# Patient Record
Sex: FEMALE | Race: BLACK OR AFRICAN AMERICAN | NOT HISPANIC OR LATINO | Employment: OTHER | URBAN - METROPOLITAN AREA
[De-identification: names, ages, dates, MRNs, and addresses within clinical notes are randomized per-mention and may not be internally consistent; named-entity substitution may affect disease eponyms.]

---

## 2017-01-18 ENCOUNTER — OFFICE VISIT (OUTPATIENT)
Dept: NEPHROLOGY | Facility: CLINIC | Age: 27
End: 2017-01-18
Payer: MEDICARE

## 2017-01-18 VITALS
SYSTOLIC BLOOD PRESSURE: 170 MMHG | HEIGHT: 65 IN | BODY MASS INDEX: 22.89 KG/M2 | WEIGHT: 137.38 LBS | HEART RATE: 91 BPM | OXYGEN SATURATION: 100 % | DIASTOLIC BLOOD PRESSURE: 120 MMHG

## 2017-01-18 DIAGNOSIS — N18.6 ESRD (END STAGE RENAL DISEASE): Primary | ICD-10-CM

## 2017-01-18 DIAGNOSIS — N18.6 HYPERTENSIVE KIDNEY DISEASE WITH END-STAGE RENAL DISEASE: ICD-10-CM

## 2017-01-18 DIAGNOSIS — D63.1 ANEMIA OF CHRONIC RENAL FAILURE, STAGE 5: ICD-10-CM

## 2017-01-18 DIAGNOSIS — N25.81 HYPERPARATHYROIDISM, SECONDARY RENAL: ICD-10-CM

## 2017-01-18 DIAGNOSIS — Z94.4 LIVER REPLACED BY TRANSPLANT: ICD-10-CM

## 2017-01-18 DIAGNOSIS — I12.0 HYPERTENSIVE KIDNEY DISEASE WITH END-STAGE RENAL DISEASE: ICD-10-CM

## 2017-01-18 DIAGNOSIS — N18.5 ANEMIA OF CHRONIC RENAL FAILURE, STAGE 5: ICD-10-CM

## 2017-01-18 PROCEDURE — 99999 PR PBB SHADOW E&M-EST. PATIENT-LVL II: CPT | Mod: PBBFAC,,, | Performed by: INTERNAL MEDICINE

## 2017-01-18 PROCEDURE — 99499 UNLISTED E&M SERVICE: CPT | Mod: S$PBB,,, | Performed by: INTERNAL MEDICINE

## 2017-01-18 PROCEDURE — 99212 OFFICE O/P EST SF 10 MIN: CPT | Mod: PBBFAC,PO | Performed by: INTERNAL MEDICINE

## 2017-01-18 RX ORDER — LABETALOL 100 MG/1
100 TABLET, FILM COATED ORAL 2 TIMES DAILY
Qty: 60 TABLET | Refills: 11 | Status: SHIPPED | OUTPATIENT
Start: 2017-01-18 | End: 2017-02-22 | Stop reason: SDUPTHER

## 2017-01-22 NOTE — PROGRESS NOTES
Subjective:       Patient ID: Holly Patel is a 26 y.o. Black or  female who presents for follow-up evaluation of ESRD    HPI    Ms. 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  Patient initiated on hemodialysis October 2015, trained on home hemodialysis February 2016, now performing at home without difficulty (with main assistance from her sister).  Patient reports blood pressures have been elevated.  She otherwise denies any fever, chest pain, shortness of breath, abdominal pain, diarrhea, dysuria/hematuria.    Review of Systems   Constitutional: Negative for appetite change, fatigue and fever.   Respiratory: Negative for chest tightness and shortness of breath.    Cardiovascular: Negative for chest pain and leg swelling.   Gastrointestinal: Negative for abdominal pain, constipation, diarrhea, nausea and vomiting.   Genitourinary: Negative for difficulty urinating, dysuria, flank pain, frequency, hematuria and urgency.   Musculoskeletal: Negative for arthralgias, joint swelling and myalgias.   Skin: Negative for rash and wound.   Neurological: Negative for dizziness, weakness and light-headedness.   All other systems reviewed and are negative.      Objective:      Physical Exam   Constitutional: She appears well-developed and well-nourished.   Cardiovascular: Normal rate, regular rhythm and normal heart sounds.  Exam reveals no gallop and no friction rub.    No murmur heard.  Pulmonary/Chest: Effort normal and breath sounds normal. No respiratory distress. She has no wheezes. She has no rales.   Abdominal: Soft. Bowel sounds are normal. There is no tenderness.   Musculoskeletal: She exhibits no edema.   Neurological: She is alert.   Skin: Skin is warm and dry. No rash noted. No erythema.   Psychiatric: She has a normal mood and affect.   Vitals  reviewed.    Access: L AVF w/ good thrill/bruit    Non-OCF labs Mars  eKt/V 2.4  H/H 10.4/31.6  Ca/Phos 9/4.1  PTH 2505  Alb 3.7    Assessment:       1. ESRD (end stage renal disease)    2. Hypertensive kidney disease with end-stage renal disease    3. Liver replaced by transplant    4. Hyperparathyroidism, secondary renal    5. Anemia of chronic renal failure, stage 5        Plan:     Ms. 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  Patient initiated on hemodialysis October 2015, trained on home hemodialysis February 2016, now performing at home without difficulty (with main assistance from her sister).  Kt/V previously above goal, reduced treatments to 4x/wk, now at goal, will continue to monitor clearance.  Patient followed by Transplant for possible kidney transplantation.  - Hypertension: BP elevated, patient previously unable to afford carvedilol, will start labetolol and review BP log  - Anemia: Hgb at goal, continue erythropoetin therapy  - Bone/mineral metabolism: patient with secondary hyperparathyroidism, discussed low phosphorous diet, previously increased calcitriol, initiated cinacalcet (had not received yet due to insurance/pharmacy)    Return to clinic monthly

## 2017-01-31 ENCOUNTER — TELEPHONE (OUTPATIENT)
Dept: TRANSPLANT | Facility: CLINIC | Age: 27
End: 2017-01-31

## 2017-01-31 NOTE — TELEPHONE ENCOUNTER
----- Message from Marifer Morrell sent at 1/30/2017 10:50 AM CST -----  Contact: emma/Patric  Please call pt has some FU questions  Please call her @ # [phone]

## 2017-01-31 NOTE — TELEPHONE ENCOUNTER
Nurse spoke with patient and informed her that she will be denied due to a history of noncompliance with post liver appointments. She will need 12 moths compliance before being re-referred. Patient verbalized understanding of the above information.

## 2017-02-22 ENCOUNTER — OFFICE VISIT (OUTPATIENT)
Dept: NEPHROLOGY | Facility: CLINIC | Age: 27
End: 2017-02-22
Payer: MEDICARE

## 2017-02-22 VITALS
SYSTOLIC BLOOD PRESSURE: 150 MMHG | OXYGEN SATURATION: 100 % | HEIGHT: 65 IN | DIASTOLIC BLOOD PRESSURE: 90 MMHG | WEIGHT: 141.13 LBS | HEART RATE: 80 BPM | BODY MASS INDEX: 23.52 KG/M2

## 2017-02-22 DIAGNOSIS — I12.0 HYPERTENSIVE KIDNEY DISEASE WITH END-STAGE RENAL DISEASE: ICD-10-CM

## 2017-02-22 DIAGNOSIS — N25.81 HYPERPARATHYROIDISM, SECONDARY RENAL: ICD-10-CM

## 2017-02-22 DIAGNOSIS — Z94.4 LIVER REPLACED BY TRANSPLANT: ICD-10-CM

## 2017-02-22 DIAGNOSIS — N18.5 ANEMIA OF CHRONIC RENAL FAILURE, STAGE 5: ICD-10-CM

## 2017-02-22 DIAGNOSIS — N18.6 HYPERTENSIVE KIDNEY DISEASE WITH END-STAGE RENAL DISEASE: ICD-10-CM

## 2017-02-22 DIAGNOSIS — D63.1 ANEMIA OF CHRONIC RENAL FAILURE, STAGE 5: ICD-10-CM

## 2017-02-22 DIAGNOSIS — N18.6 ESRD (END STAGE RENAL DISEASE): Primary | ICD-10-CM

## 2017-02-22 PROCEDURE — 99213 OFFICE O/P EST LOW 20 MIN: CPT | Mod: PBBFAC,PO | Performed by: INTERNAL MEDICINE

## 2017-02-22 PROCEDURE — 99999 PR PBB SHADOW E&M-EST. PATIENT-LVL III: CPT | Mod: PBBFAC,,, | Performed by: INTERNAL MEDICINE

## 2017-02-22 PROCEDURE — 99499 UNLISTED E&M SERVICE: CPT | Mod: S$PBB,,, | Performed by: INTERNAL MEDICINE

## 2017-02-22 RX ORDER — LABETALOL 200 MG/1
200 TABLET, FILM COATED ORAL 2 TIMES DAILY
Qty: 60 TABLET | Refills: 11 | Status: SHIPPED | OUTPATIENT
Start: 2017-02-22 | End: 2017-10-30 | Stop reason: SDUPTHER

## 2017-02-22 RX ORDER — LABETALOL 200 MG/1
200 TABLET, FILM COATED ORAL 2 TIMES DAILY
Qty: 60 TABLET | Refills: 11 | Status: SHIPPED | OUTPATIENT
Start: 2017-02-22 | End: 2017-02-22 | Stop reason: SDUPTHER

## 2017-02-22 NOTE — MR AVS SNAPSHOT
[facility]  [address] [facility]  [place] [address]  Phone: [phone]                  Holly Patel   2017 4:20 PM   Office Visit    Description:  Female : 1990   Provider:  Viktor Bass MD   Department:  [facility]                To Do List           Future Appointments        Provider Department Dept Phone    2017 4:20 PM Viktor Bass MD [facility] [phone]    3/13/2017 8:15 AM LAB, [facility] [phone]    3/29/2017 4:20 PM Viktor Bass [facility] [phone]      Goals (5 Years of Data)     None      [facility]     [facility] -  Assistance  Registered nurses in the [facility] provide clinical advisement, health education, appointment booking, and other advisory services.  Call for this free service at [phone].             Medications           Message regarding Medications     Verify the changes and/or additions to your medication regime listed below are the same as discussed with your clinician today.  If any of these changes or additions are incorrect, please notify your healthcare provider.             Verify that the below list of medications is an accurate representation of the medications you are currently taking.  If none reported, the list may be blank. If incorrect, please contact your healthcare provider. Carry this list with you in case of emergency.           Current Medications     amlodipine (NORVASC) 10 MG tablet Take 1 tablet (10 mg total) by mouth once daily.    cloNIDine (CATAPRES) 0.1 MG tablet Take 1 tablet (0.1 mg total) by mouth 2 (two) times daily.    labetalol (NORMODYNE) 100 MG tablet Take 1 tablet (100 mg total) by mouth 2 (two) times daily.    tacrolimus (PROGRAF) 1 MG Cap Take 6 capsules (6 mg total) by mouth every 12 (twelve) hours.    triamcinolone acetonide 0.1% (KENALOG) 0.1 % ointment AAA on arms, legs,  "and neck bid x 1-2 wks then prn flares only           Clinical Reference Information           Your Vitals Were     BP Pulse Height Weight SpO2 BMI    150/90 80 5' 5" (1.651 m) 64 kg (141 lb 1.5 oz) 100% 23.48 kg/m2      Blood Pressure          Most Recent Value    BP  (!)  150/90      Allergies as of 2/22/2017     Chloral Hydrate    Hydrocodone      Immunizations Administered on Date of Encounter - 2/22/2017     None      Maintenance Dialysis History     Start End Type Comments Center    10/6/2015  [facility]  [facility]            Current Dialysis Center Information     [facility] [address] [place] Phone #:  [phone]    Contact:  N/A ROXANNA SHAH  [address] Fax #:  [phone]            Transplant Information        Txp Date Organ Coordinator Care Team    11/8/1992 Liver Violeta Francis, EDMOND Surgeon:  Dylan Griffith MD   Current Hepatologist:  Lei Pinedo MD   Corresponding Physician:  Enrrique Moise MD   Current PCP:  Enrrique Moise MD         Language Assistance Services     ATTENTION: Language assistance services are available, free of charge. Please call [phone].      ATENCIÓN: Si habla español, tiene a kidd disposición servicios gratuitos de asistencia lingüística. Llame al [phone].     CHÚ Ý: N?u b?n nói Ti?ng Vi?t, có các d?ch v? h? tr? ngôn ng? mi?n phí dành cho b?n. G?i s? [phone].         [facility] complies with applicable Federal civil rights laws and does not discriminate on the basis of race, color, national origin, age, disability, or sex.        "

## 2017-02-28 NOTE — PROGRESS NOTES
Subjective:       Patient ID: Holly Patel is a 26 y.o. Black or  female who presents for follow-up evaluation of ESRD    HPI    Ms. 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  Patient initiated on hemodialysis October 2015, trained on home hemodialysis February 2016, now performing at home without difficulty (with main assistance from her sister).  Patient reports blood pressures have been better controlled.  She otherwise denies any fever, chest pain, shortness of breath, abdominal pain, diarrhea, dysuria/hematuria.    Review of Systems   Constitutional: Negative for appetite change, fatigue and fever.   Respiratory: Negative for chest tightness and shortness of breath.    Cardiovascular: Negative for chest pain and leg swelling.   Gastrointestinal: Negative for abdominal pain, constipation, diarrhea, nausea and vomiting.   Genitourinary: Negative for difficulty urinating, dysuria, flank pain, frequency, hematuria and urgency.   Musculoskeletal: Negative for arthralgias, joint swelling and myalgias.   Skin: Negative for rash and wound.   Neurological: Negative for dizziness, weakness and light-headedness.   All other systems reviewed and are negative.      Objective:      Physical Exam   Constitutional: She appears well-developed and well-nourished.   Cardiovascular: Normal rate, regular rhythm and normal heart sounds.  Exam reveals no gallop and no friction rub.    No murmur heard.  Pulmonary/Chest: Effort normal and breath sounds normal. No respiratory distress. She has no wheezes. She has no rales.   Abdominal: Soft. Bowel sounds are normal. There is no tenderness.   Musculoskeletal: She exhibits no edema.   Neurological: She is alert.   Skin: Skin is warm and dry. No rash noted. No erythema.   Psychiatric: She has a normal mood and affect.    Vitals reviewed.    Access: L AVF w/ good thrill/bruit    Non-OCF labs Feb  eKt/V 2.4 (Jan)  H/H 9.9/31.0  Ca/Phos 8.0/5.0  PTH 2613  Alb 3.8    Assessment:       1. ESRD (end stage renal disease)    2. Hypertensive kidney disease with end-stage renal disease    3. Liver replaced by transplant    4. Hyperparathyroidism, secondary renal    5. Anemia of chronic renal failure, stage 5        Plan:     Ms. 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  Patient initiated on hemodialysis October 2015, trained on home hemodialysis February 2016, now performing at home without difficulty (with main assistance from her sister).  Kt/V previously above goal, reduced treatments to 4x/wk, now at goal, will continue to monitor clearance.  Patient followed by Transplant for possible kidney transplantation.  - Hypertension: BP improved though still elevated, patient previously unable to afford carvedilol, will increase labetolol and review BP log  - Anemia: Hgb near goal, continue erythropoetin therapy  - Bone/mineral metabolism: patient with secondary hyperparathyroidism, discussed low phosphorous diet, previously increased calcitriol, initiated cinacalcet (had not received yet due to insurance/pharmacy)    Return to clinic monthly

## 2017-03-14 ENCOUNTER — TELEPHONE (OUTPATIENT)
Dept: TRANSPLANT | Facility: CLINIC | Age: 27
End: 2017-03-14

## 2017-03-14 DIAGNOSIS — Z94.4 LIVER TRANSPLANTED: Primary | ICD-10-CM

## 2017-03-14 NOTE — TELEPHONE ENCOUNTER
Spoke with pt, advised that she missed labs yesterday.  Pt states she thought they were for 3/17/17. Agreed to have labs tomorrow. Appointment scheduled.

## 2017-03-15 ENCOUNTER — LAB VISIT (OUTPATIENT)
Dept: LAB | Facility: HOSPITAL | Age: 27
End: 2017-03-15
Attending: INTERNAL MEDICINE
Payer: MEDICARE

## 2017-03-15 DIAGNOSIS — Z94.4 LIVER TRANSPLANTED: ICD-10-CM

## 2017-03-15 LAB
ALBUMIN SERPL BCP-MCNC: 3.3 G/DL
ALP SERPL-CCNC: 379 U/L
ALT SERPL W/O P-5'-P-CCNC: 25 U/L
ANION GAP SERPL CALC-SCNC: 11 MMOL/L
AST SERPL-CCNC: 22 U/L
BASOPHILS # BLD AUTO: 0.01 K/UL
BASOPHILS NFR BLD: 0.2 %
BILIRUB SERPL-MCNC: 0.4 MG/DL
BUN SERPL-MCNC: 68 MG/DL
CALCIUM SERPL-MCNC: 9 MG/DL
CHLORIDE SERPL-SCNC: 106 MMOL/L
CO2 SERPL-SCNC: 20 MMOL/L
CREAT SERPL-MCNC: 7.7 MG/DL
DIFFERENTIAL METHOD: ABNORMAL
EOSINOPHIL # BLD AUTO: 0.6 K/UL
EOSINOPHIL NFR BLD: 14.5 %
ERYTHROCYTE [DISTWIDTH] IN BLOOD BY AUTOMATED COUNT: 13.8 %
EST. GFR  (AFRICAN AMERICAN): 7.6 ML/MIN/1.73 M^2
EST. GFR  (NON AFRICAN AMERICAN): 6.6 ML/MIN/1.73 M^2
GLUCOSE SERPL-MCNC: 98 MG/DL
HCT VFR BLD AUTO: 31.6 %
HGB BLD-MCNC: 10.3 G/DL
LYMPHOCYTES # BLD AUTO: 0.9 K/UL
LYMPHOCYTES NFR BLD: 22.4 %
MCH RBC QN AUTO: 26.5 PG
MCHC RBC AUTO-ENTMCNC: 32.6 %
MCV RBC AUTO: 81 FL
MONOCYTES # BLD AUTO: 0.3 K/UL
MONOCYTES NFR BLD: 6.7 %
NEUTROPHILS # BLD AUTO: 2.3 K/UL
NEUTROPHILS NFR BLD: 56 %
PLATELET # BLD AUTO: 113 K/UL
PMV BLD AUTO: 10 FL
POTASSIUM SERPL-SCNC: 4.7 MMOL/L
PROT SERPL-MCNC: 7.3 G/DL
RBC # BLD AUTO: 3.89 M/UL
SODIUM SERPL-SCNC: 137 MMOL/L
WBC # BLD AUTO: 4.15 K/UL

## 2017-03-15 PROCEDURE — 85025 COMPLETE CBC W/AUTO DIFF WBC: CPT

## 2017-03-15 PROCEDURE — 36415 COLL VENOUS BLD VENIPUNCTURE: CPT | Mod: PO

## 2017-03-15 PROCEDURE — 80197 ASSAY OF TACROLIMUS: CPT

## 2017-03-15 PROCEDURE — 80053 COMPREHEN METABOLIC PANEL: CPT

## 2017-03-16 LAB — TACROLIMUS BLD-MCNC: <1.5 NG/ML

## 2017-03-17 ENCOUNTER — TELEPHONE (OUTPATIENT)
Dept: TRANSPLANT | Facility: CLINIC | Age: 27
End: 2017-03-17

## 2017-03-17 NOTE — LETTER
March 17, 2017    Holly Patel  [address] [place] [address]          Dear Holly Patel:  MRN: [id number]    Your lab results were stable.  There are no medicine changes.  Please have your labs drawn again on 6/5/17.      If you cannot have your labs drawn on the scheduled date, it is your responsibility to call the transplant department to reschedule.  To reschedule or make an appointment, please as to speak to or leave a message for my assistant, Jaki Chavis, at [phone].  When leaving a message for Palmira Pollack, or myself, we ask that you leave a brief message regarding your request.    Sincerely,    Voileta Francis, RN, BSN  Liver Transplant Coordinator  [facility]  [address] [place] [id number] [phone]

## 2017-03-29 ENCOUNTER — OFFICE VISIT (OUTPATIENT)
Dept: NEPHROLOGY | Facility: CLINIC | Age: 27
End: 2017-03-29
Payer: MEDICARE

## 2017-03-29 VITALS
HEART RATE: 94 BPM | OXYGEN SATURATION: 100 % | WEIGHT: 143.94 LBS | HEIGHT: 65 IN | BODY MASS INDEX: 23.98 KG/M2 | SYSTOLIC BLOOD PRESSURE: 160 MMHG | DIASTOLIC BLOOD PRESSURE: 90 MMHG

## 2017-03-29 DIAGNOSIS — N18.5 ANEMIA OF CHRONIC RENAL FAILURE, STAGE 5: ICD-10-CM

## 2017-03-29 DIAGNOSIS — N18.6 ESRD (END STAGE RENAL DISEASE): Primary | ICD-10-CM

## 2017-03-29 DIAGNOSIS — Z94.4 LIVER REPLACED BY TRANSPLANT: ICD-10-CM

## 2017-03-29 DIAGNOSIS — I12.0 HYPERTENSIVE KIDNEY DISEASE WITH END-STAGE RENAL DISEASE: ICD-10-CM

## 2017-03-29 DIAGNOSIS — N25.81 HYPERPARATHYROIDISM, SECONDARY RENAL: ICD-10-CM

## 2017-03-29 DIAGNOSIS — D63.1 ANEMIA OF CHRONIC RENAL FAILURE, STAGE 5: ICD-10-CM

## 2017-03-29 DIAGNOSIS — N18.6 HYPERTENSIVE KIDNEY DISEASE WITH END-STAGE RENAL DISEASE: ICD-10-CM

## 2017-03-29 PROCEDURE — 99999 PR PBB SHADOW E&M-EST. PATIENT-LVL II: CPT | Mod: PBBFAC,,, | Performed by: INTERNAL MEDICINE

## 2017-03-29 PROCEDURE — 99212 OFFICE O/P EST SF 10 MIN: CPT | Mod: PBBFAC,PO | Performed by: INTERNAL MEDICINE

## 2017-03-29 PROCEDURE — 99499 UNLISTED E&M SERVICE: CPT | Mod: S$PBB,,, | Performed by: INTERNAL MEDICINE

## 2017-04-04 NOTE — PROGRESS NOTES
Subjective:       Patient ID: Holly Patel is a 26 y.o. Black or  female who presents for follow-up evaluation of ESRD    HPI    Ms. 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  Patient initiated on hemodialysis October 2015, trained on home hemodialysis February 2016, now performing at home without difficulty (with main assistance from her sister).  Patient reports blood pressures have been better controlled.  She otherwise denies any fever, chest pain, shortness of breath, abdominal pain, diarrhea, dysuria/hematuria.    Review of Systems   Constitutional: Negative for appetite change, fatigue and fever.   Respiratory: Negative for chest tightness and shortness of breath.    Cardiovascular: Negative for chest pain and leg swelling.   Gastrointestinal: Negative for abdominal pain, constipation, diarrhea, nausea and vomiting.   Genitourinary: Negative for difficulty urinating, dysuria, flank pain, frequency, hematuria and urgency.   Musculoskeletal: Negative for arthralgias, joint swelling and myalgias.   Skin: Negative for rash and wound.   Neurological: Negative for dizziness, weakness and light-headedness.   All other systems reviewed and are negative.      Objective:      Physical Exam   Constitutional: She appears well-developed and well-nourished.   Cardiovascular: Normal rate, regular rhythm and normal heart sounds.  Exam reveals no gallop and no friction rub.    No murmur heard.  Pulmonary/Chest: Effort normal and breath sounds normal. No respiratory distress. She has no wheezes. She has no rales.   Abdominal: Soft. Bowel sounds are normal. There is no tenderness.   Musculoskeletal: She exhibits no edema.   Neurological: She is alert.   Skin: Skin is warm and dry. No rash noted. No erythema.   Psychiatric: She has a normal mood and affect.    Vitals reviewed.    Access: L AVF w/ good thrill/bruit    Non-OCF labs March  eKt/V 2.3  H/H 10.3/30.3  Ca/Phos 9.1/5.0  PTH 2842  Alb 3.8    Assessment:       1. ESRD (end stage renal disease)    2. Hypertensive kidney disease with end-stage renal disease    3. Liver replaced by transplant    4. Hyperparathyroidism, secondary renal    5. Anemia of chronic renal failure, stage 5        Plan:     Ms. 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  Patient initiated on hemodialysis October 2015, trained on home hemodialysis February 2016, now performing at home without difficulty (with main assistance from her sister).  Kt/V previously above goal, reduced treatments to 4x/wk, now at goal, will continue to monitor clearance.  Patient followed by Transplant for possible kidney transplantation.  - Hypertension: BP improved though still elevated, patient previously unable to afford carvedilol, increased labetolol and review BP log  - Anemia: Hgb at goal, continue erythropoetin therapy  - Bone/mineral metabolism: patient with secondary hyperparathyroidism, discussed low phosphorous diet, previously increased calcitriol, initiated cinacalcet (had not received previously due to insurance/pharmacy)    Return to clinic monthly

## 2017-04-12 ENCOUNTER — TELEPHONE (OUTPATIENT)
Dept: TRANSPLANT | Facility: CLINIC | Age: 27
End: 2017-04-12

## 2017-04-12 ENCOUNTER — OFFICE VISIT (OUTPATIENT)
Dept: NEPHROLOGY | Facility: CLINIC | Age: 27
End: 2017-04-12
Payer: MEDICARE

## 2017-04-12 VITALS
HEIGHT: 65 IN | HEART RATE: 103 BPM | BODY MASS INDEX: 23.88 KG/M2 | SYSTOLIC BLOOD PRESSURE: 162 MMHG | OXYGEN SATURATION: 100 % | DIASTOLIC BLOOD PRESSURE: 100 MMHG | WEIGHT: 143.31 LBS

## 2017-04-12 DIAGNOSIS — D63.1 ANEMIA OF CHRONIC RENAL FAILURE, STAGE 5: ICD-10-CM

## 2017-04-12 DIAGNOSIS — I12.0 HYPERTENSIVE KIDNEY DISEASE WITH END-STAGE RENAL DISEASE: ICD-10-CM

## 2017-04-12 DIAGNOSIS — N18.6 HYPERTENSIVE KIDNEY DISEASE WITH END-STAGE RENAL DISEASE: ICD-10-CM

## 2017-04-12 DIAGNOSIS — N18.6 ESRD (END STAGE RENAL DISEASE): Primary | ICD-10-CM

## 2017-04-12 DIAGNOSIS — Z94.4 LIVER REPLACED BY TRANSPLANT: ICD-10-CM

## 2017-04-12 DIAGNOSIS — N18.5 ANEMIA OF CHRONIC RENAL FAILURE, STAGE 5: ICD-10-CM

## 2017-04-12 DIAGNOSIS — N25.81 HYPERPARATHYROIDISM, SECONDARY RENAL: ICD-10-CM

## 2017-04-12 PROCEDURE — 99999 PR PBB SHADOW E&M-EST. PATIENT-LVL II: CPT | Mod: PBBFAC,,, | Performed by: INTERNAL MEDICINE

## 2017-04-12 PROCEDURE — 99212 OFFICE O/P EST SF 10 MIN: CPT | Mod: PBBFAC,PO | Performed by: INTERNAL MEDICINE

## 2017-04-12 PROCEDURE — 99499 UNLISTED E&M SERVICE: CPT | Mod: S$PBB,,, | Performed by: INTERNAL MEDICINE

## 2017-04-12 NOTE — TELEPHONE ENCOUNTER
----- Message from Jaki Mejia sent at 4/12/2017  2:22 PM CDT -----  I spoke to the patient today ( 4-12-17 ) at 2:20 pm , and she accepted the appt with Dr. Pinedo on 4/18/17 at 4:30 pm

## 2017-04-18 ENCOUNTER — OFFICE VISIT (OUTPATIENT)
Dept: TRANSPLANT | Facility: CLINIC | Age: 27
End: 2017-04-18
Payer: MEDICARE

## 2017-04-18 VITALS
TEMPERATURE: 98 F | OXYGEN SATURATION: 99 % | DIASTOLIC BLOOD PRESSURE: 110 MMHG | HEIGHT: 65 IN | HEART RATE: 84 BPM | WEIGHT: 141.31 LBS | SYSTOLIC BLOOD PRESSURE: 170 MMHG | BODY MASS INDEX: 23.54 KG/M2 | RESPIRATION RATE: 18 BRPM

## 2017-04-18 DIAGNOSIS — Z99.2 END STAGE RENAL DISEASE ON DIALYSIS: ICD-10-CM

## 2017-04-18 DIAGNOSIS — Z29.89 PROPHYLACTIC IMMUNOTHERAPY: ICD-10-CM

## 2017-04-18 DIAGNOSIS — N18.6 END STAGE RENAL DISEASE ON DIALYSIS: ICD-10-CM

## 2017-04-18 DIAGNOSIS — I15.0 RENOVASCULAR HYPERTENSION: Primary | ICD-10-CM

## 2017-04-18 DIAGNOSIS — Z94.4 LIVER REPLACED BY TRANSPLANT: ICD-10-CM

## 2017-04-18 PROCEDURE — 99213 OFFICE O/P EST LOW 20 MIN: CPT | Mod: PBBFAC | Performed by: INTERNAL MEDICINE

## 2017-04-18 PROCEDURE — 99999 PR PBB SHADOW E&M-EST. PATIENT-LVL III: CPT | Mod: PBBFAC,,, | Performed by: INTERNAL MEDICINE

## 2017-04-18 PROCEDURE — 99214 OFFICE O/P EST MOD 30 MIN: CPT | Mod: S$PBB,,, | Performed by: INTERNAL MEDICINE

## 2017-04-18 RX ORDER — PREDNISONE 1 MG/1
1 TABLET ORAL EVERY OTHER DAY
Status: ON HOLD | COMMUNITY
End: 2018-09-04 | Stop reason: SDUPTHER

## 2017-04-18 NOTE — PROGRESS NOTES
Subjective:       Patient ID: Holly Patel is a 26 y.o. female.    Chief Complaint: Liver Transplant Follow-up    HPI  I saw this 26 year old  lady in the liver transplant clinic. She is keeping well but she continues to have uncontrolled hypertension and end stage renal disease and is now on dialysis- home HD.    She gets her blood tests done irregularly and despite being on tacrolimus 12 mg BID and has always had an undetectable or very low tacrolimus level.   She is adamant that she takes her medications.      OLT  aged 2 for hemangioendothelioma  On HD since Oct 2015, home HD since 2016.  Seen by Social Work and deemed that she needs to show compliance before she can be evaluated for kidney Tx.    LFts- normal    BP today is  170/110      Abdominal US: 2016  1.  Appropriate hepatic vascular flow.  2.  Satisfactory post op status.    Liver biopsy: 3/12/2016  LIVER, POST TRANSPLANT 2009 (NEEDLE BIOPSY):  Rare endothelitis, not diagnostic of acute cellular rejection  Small biopsy (only 4 portal tracts present), no bile duct loss  Rare apoptotic bodies and foci of neutrophils  No fibrosis, moderate Kupffer cell iron, no hepatocyte iron.    Tac 12 mg BID + prednisone 5 mg every other day    Lab Results   Component Value Date    ALT 25 03/15/2017    AST 22 03/15/2017     (H) 2012    ALKPHOS 379 (H) 03/15/2017    BILITOT 0.4 03/15/2017     Past Medical History:   Diagnosis Date    Allergy     Anemia requiring transfusions 2015    ESRD (end stage renal disease) 2015    Hypertension     Kidney disease     Liver transplanted     Malignant hypertension with chronic kidney disease stage IV 2014    Renal disorder      Past Surgical History:   Procedure Laterality Date     SECTION      2     LIVER BIOPSY      LIVER TRANSPLANT  1992    TUBAL LIGATION       Current Outpatient Prescriptions   Medication Sig    amlodipine (NORVASC) 10 MG  tablet Take 1 tablet (10 mg total) by mouth once daily.    labetalol (NORMODYNE) 200 MG tablet Take 1 tablet (200 mg total) by mouth 2 (two) times daily.    predniSONE (DELTASONE) 1 MG tablet Take 1 mg by mouth every other day.    tacrolimus (PROGRAF) 1 MG Cap Take 6 capsules (6 mg total) by mouth every 12 (twelve) hours.    triamcinolone acetonide 0.1% (KENALOG) 0.1 % ointment AAA on arms, legs, and neck bid x 1-2 wks then prn flares only (Patient taking differently: Apply to affected area(s) on arms, legs, and neck twice daily x 1-2 wks then as needed for flares only)    cloNIDine (CATAPRES) 0.1 MG tablet Take 1 tablet (0.1 mg total) by mouth 2 (two) times daily.     No current facility-administered medications for this visit.          Review of Systems   Constitutional: Negative for activity change, appetite change, chills, fatigue, fever and unexpected weight change.   HENT: Negative for ear pain, hearing loss, nosebleeds, sore throat and trouble swallowing.    Eyes: Negative for redness and visual disturbance.   Respiratory: Negative for cough, chest tightness, shortness of breath and wheezing.    Cardiovascular: Negative for chest pain and palpitations.   Gastrointestinal: Negative for abdominal distention, abdominal pain, blood in stool, constipation, diarrhea, nausea and vomiting.   Genitourinary: Negative for difficulty urinating, dysuria, frequency, hematuria and urgency.   Musculoskeletal: Negative for arthralgias, back pain, gait problem, joint swelling and myalgias.   Skin: Negative for rash.   Neurological: Negative for tremors, seizures, speech difficulty, weakness and headaches.   Hematological: Negative for adenopathy.   Psychiatric/Behavioral: Negative for confusion, decreased concentration and sleep disturbance. The patient is not nervous/anxious.          Objective:    BP (!) 170/110 (BP Location: Right arm, Patient Position: Sitting, BP Method: Automatic)  Pulse 84  Temp 98 °F (36.7 °C)  "(Oral)   Resp 18  Ht 5' 5" (1.651 m)  Wt 64.1 kg (141 lb 5 oz)  SpO2 99%  BMI 23.52 kg/m2    Physical Exam   Constitutional: She appears well-developed and well-nourished. No distress.   HENT:   Head: Normocephalic.   Eyes: Pupils are equal, round, and reactive to light.   Neck: No JVD present. No tracheal deviation present. No thyromegaly present.   Cardiovascular: Normal rate, regular rhythm and normal heart sounds.    No murmur heard.  Pulmonary/Chest: Effort normal and breath sounds normal. No stridor.   Abdominal: Soft. Bowel sounds are normal. She exhibits no distension. There is no tenderness.   Musculoskeletal: She exhibits no edema.   Lymphadenopathy:        Head (right side): No submental, no submandibular, no tonsillar, no preauricular, no posterior auricular and no occipital adenopathy present.        Head (left side): No submental, no submandibular, no tonsillar, no preauricular, no posterior auricular and no occipital adenopathy present.     She has no cervical adenopathy.     She has no axillary adenopathy.   Neurological: She is alert. She has normal strength. She is not disoriented. No cranial nerve deficit or sensory deficit.   Skin: Skin is intact. No cyanosis.       Assessment:       1. Renovascular hypertension    2. Prophylactic immunotherapy    3. Liver replaced by transplant    4. End stage renal disease on dialysis        Plan:   1. Uncontrolled hypertension  2. Very low tac level- compliance? Claims that she always takes her meds  3. No change in immunosuppression    Wishes to be reconsidered for kidney Tx.  - will ask social work to re-evaluate.  - no medication changes at present.    Clinic in 1 year.      Clinic in 6 months    "

## 2017-04-18 NOTE — LETTER
April 23, 2017        Enrrique Moise  [address]  [facility] [address]  Phone: [phone]  Fax: [phone]             [facility]  [address]  [facility] [address]  Phone: [phone]   Patient: Holly Patel   MR Number: [id number]   YOB: 1990   Date of Visit: 4/18/2017       Dear Dr. Enrrique Moise    Thank you for referring Holly Patel to me for evaluation. Attached you will find relevant portions of my assessment and plan of care.    If you have questions, please do not hesitate to call me. I look forward to following Holly Patel along with you.    Sincerely,    Lei Pinedo MD    Enclosure    If you would like to receive this communication electronically, please contact [email or web address] or [phone] to request [employer] Link access.    [employer] Link is a tool which provides read-only access to select patient information with whom you have a relationship. Its easy to use and provides real time access to review your patients record including encounter summaries, notes, results, and demographic information.    If you feel you have received this communication in error or would no longer like to receive these types of communications, please e-mail [email or web address]

## 2017-04-18 NOTE — PROGRESS NOTES
Subjective:       Patient ID: Holly Patel is a 26 y.o. Black or  female who presents for follow-up evaluation of ESRD    HPI    Ms. 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  Patient initiated on hemodialysis October 2015, trained on home hemodialysis February 2016, now performing at home without difficulty (with main assistance from her sister).  Patient reports blood pressures have been better controlled.  She otherwise denies any fever, chest pain, shortness of breath, abdominal pain, diarrhea, dysuria/hematuria.    Review of Systems   Constitutional: Negative for appetite change, fatigue and fever.   Respiratory: Negative for chest tightness and shortness of breath.    Cardiovascular: Negative for chest pain and leg swelling.   Gastrointestinal: Negative for abdominal pain, constipation, diarrhea, nausea and vomiting.   Genitourinary: Negative for difficulty urinating, dysuria, flank pain, frequency, hematuria and urgency.   Musculoskeletal: Negative for arthralgias, joint swelling and myalgias.   Skin: Negative for rash and wound.   Neurological: Negative for dizziness, weakness and light-headedness.   All other systems reviewed and are negative.      Objective:      Physical Exam   Constitutional: She appears well-developed and well-nourished.   Cardiovascular: Normal rate, regular rhythm and normal heart sounds.  Exam reveals no gallop and no friction rub.    No murmur heard.  Pulmonary/Chest: Effort normal and breath sounds normal. No respiratory distress. She has no wheezes. She has no rales.   Abdominal: Soft. Bowel sounds are normal. There is no tenderness.   Musculoskeletal: She exhibits no edema.   Neurological: She is alert.   Skin: Skin is warm and dry. No rash noted. No erythema.   Psychiatric: She has a normal mood and affect.    Vitals reviewed.    Access: L AVF w/ good thrill/bruit    Non-OCF labs April  eKt/V 2.3 (March)  H/H 9.4/28.3  Ca/Phos 8.8/4.7  PTH 1563  Alb 3.6    Assessment:       1. ESRD (end stage renal disease)    2. Hypertensive kidney disease with end-stage renal disease    3. Liver replaced by transplant    4. Hyperparathyroidism, secondary renal    5. Anemia of chronic renal failure, stage 5        Plan:     Ms. 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  Patient initiated on hemodialysis October 2015, trained on home hemodialysis February 2016, now performing at home without difficulty (with main assistance from her sister).  Kt/V previously above goal, reduced treatments to 4x/wk, now at goal, will continue to monitor clearance.  Patient followed by Transplant for possible kidney transplantation.  - Hypertension: BP improved though still elevated, patient previously unable to afford carvedilol, previously increased labetolol  - Anemia: Hgb at goal, continue erythropoetin therapy  - Bone/mineral metabolism: patient with secondary hyperparathyroidism, discussed low phosphorous diet; previously increased calcitriol, initiated cinacalcet (had not received previously due to insurance/pharmacy), PTH trending down    Return to clinic monthly

## 2017-05-24 ENCOUNTER — OFFICE VISIT (OUTPATIENT)
Dept: NEPHROLOGY | Facility: CLINIC | Age: 27
End: 2017-05-24
Payer: MEDICARE

## 2017-05-24 VITALS
DIASTOLIC BLOOD PRESSURE: 100 MMHG | HEIGHT: 65 IN | OXYGEN SATURATION: 100 % | HEART RATE: 76 BPM | BODY MASS INDEX: 22.96 KG/M2 | WEIGHT: 137.81 LBS | SYSTOLIC BLOOD PRESSURE: 170 MMHG

## 2017-05-24 DIAGNOSIS — I12.0 HYPERTENSIVE KIDNEY DISEASE WITH END-STAGE RENAL DISEASE: ICD-10-CM

## 2017-05-24 DIAGNOSIS — N18.6 ESRD (END STAGE RENAL DISEASE): Primary | ICD-10-CM

## 2017-05-24 DIAGNOSIS — N25.81 HYPERPARATHYROIDISM, SECONDARY RENAL: ICD-10-CM

## 2017-05-24 DIAGNOSIS — Z94.4 LIVER REPLACED BY TRANSPLANT: ICD-10-CM

## 2017-05-24 DIAGNOSIS — D63.1 ANEMIA OF CHRONIC RENAL FAILURE, STAGE 5: ICD-10-CM

## 2017-05-24 DIAGNOSIS — N18.5 ANEMIA OF CHRONIC RENAL FAILURE, STAGE 5: ICD-10-CM

## 2017-05-24 DIAGNOSIS — N18.6 HYPERTENSIVE KIDNEY DISEASE WITH END-STAGE RENAL DISEASE: ICD-10-CM

## 2017-05-24 PROCEDURE — 99499 UNLISTED E&M SERVICE: CPT | Mod: S$PBB,,, | Performed by: INTERNAL MEDICINE

## 2017-05-24 PROCEDURE — 99999 PR PBB SHADOW E&M-EST. PATIENT-LVL II: CPT | Mod: PBBFAC,,, | Performed by: INTERNAL MEDICINE

## 2017-05-24 PROCEDURE — 99212 OFFICE O/P EST SF 10 MIN: CPT | Mod: PBBFAC,PO | Performed by: INTERNAL MEDICINE

## 2017-06-09 ENCOUNTER — TELEPHONE (OUTPATIENT)
Dept: TRANSPLANT | Facility: CLINIC | Age: 27
End: 2017-06-09

## 2017-06-09 ENCOUNTER — LAB VISIT (OUTPATIENT)
Dept: LAB | Facility: HOSPITAL | Age: 27
End: 2017-06-09
Attending: INTERNAL MEDICINE
Payer: MEDICARE

## 2017-06-09 DIAGNOSIS — Z94.4 LIVER TRANSPLANTED: Primary | ICD-10-CM

## 2017-06-09 DIAGNOSIS — R79.89 ABNORMAL LFTS: ICD-10-CM

## 2017-06-09 DIAGNOSIS — Z94.4 LIVER TRANSPLANTED: ICD-10-CM

## 2017-06-09 LAB
ALBUMIN SERPL BCP-MCNC: 3 G/DL
ALP SERPL-CCNC: 870 U/L
ALT SERPL W/O P-5'-P-CCNC: 270 U/L
ANION GAP SERPL CALC-SCNC: 12 MMOL/L
ANISOCYTOSIS BLD QL SMEAR: SLIGHT
AST SERPL-CCNC: 182 U/L
BASOPHILS # BLD AUTO: 0.01 K/UL
BASOPHILS NFR BLD: 0.2 %
BILIRUB SERPL-MCNC: 0.9 MG/DL
BUN SERPL-MCNC: 63 MG/DL
CALCIUM SERPL-MCNC: 8.7 MG/DL
CHLORIDE SERPL-SCNC: 106 MMOL/L
CO2 SERPL-SCNC: 20 MMOL/L
CREAT SERPL-MCNC: 8 MG/DL
DIFFERENTIAL METHOD: ABNORMAL
EOSINOPHIL # BLD AUTO: 0.5 K/UL
EOSINOPHIL NFR BLD: 11.1 %
ERYTHROCYTE [DISTWIDTH] IN BLOOD BY AUTOMATED COUNT: 14 %
EST. GFR  (AFRICAN AMERICAN): 7.3 ML/MIN/1.73 M^2
EST. GFR  (NON AFRICAN AMERICAN): 6.3 ML/MIN/1.73 M^2
GLUCOSE SERPL-MCNC: 81 MG/DL
HCT VFR BLD AUTO: 30.2 %
HGB BLD-MCNC: 9.9 G/DL
HYPOCHROMIA BLD QL SMEAR: ABNORMAL
LYMPHOCYTES # BLD AUTO: 0.8 K/UL
LYMPHOCYTES NFR BLD: 19.5 %
MCH RBC QN AUTO: 26.1 PG
MCHC RBC AUTO-ENTMCNC: 32.8 %
MCV RBC AUTO: 80 FL
MONOCYTES # BLD AUTO: 0.2 K/UL
MONOCYTES NFR BLD: 5.9 %
NEUTROPHILS # BLD AUTO: 2.6 K/UL
NEUTROPHILS NFR BLD: 63.3 %
PLATELET # BLD AUTO: 83 K/UL
PLATELET BLD QL SMEAR: ABNORMAL
PMV BLD AUTO: 10.2 FL
POTASSIUM SERPL-SCNC: 4.6 MMOL/L
PROT SERPL-MCNC: 7 G/DL
RBC # BLD AUTO: 3.79 M/UL
SODIUM SERPL-SCNC: 138 MMOL/L
TACROLIMUS BLD-MCNC: <1.5 NG/ML
WBC # BLD AUTO: 4.06 K/UL

## 2017-06-09 PROCEDURE — 80053 COMPREHEN METABOLIC PANEL: CPT

## 2017-06-09 PROCEDURE — 36415 COLL VENOUS BLD VENIPUNCTURE: CPT | Mod: PO

## 2017-06-09 PROCEDURE — 85025 COMPLETE CBC W/AUTO DIFF WBC: CPT

## 2017-06-09 PROCEDURE — 80197 ASSAY OF TACROLIMUS: CPT

## 2017-06-09 NOTE — TELEPHONE ENCOUNTER
Reviewed elevated labs with Dr. Garza. Per MD, pt needs repeat labs Monday and an u/s and biopsy week of 6/12.    Spoke with pt, reviewed elevated labs and that prograf is undetectable again.  Pt did not deny or confirm if she is taking Prograf. Pt advised that she is most likely in rejection and will needs labs Monday, and u/s and biopsy next week.  Pt verbalized understanding.

## 2017-06-12 ENCOUNTER — HOSPITAL ENCOUNTER (OUTPATIENT)
Dept: RADIOLOGY | Facility: HOSPITAL | Age: 27
Discharge: HOME | End: 2017-06-12
Attending: INTERNAL MEDICINE
Payer: MEDICARE

## 2017-06-12 ENCOUNTER — TELEPHONE (OUTPATIENT)
Dept: TRANSPLANT | Facility: CLINIC | Age: 27
End: 2017-06-12

## 2017-06-12 DIAGNOSIS — R79.89 ABNORMAL LFTS: ICD-10-CM

## 2017-06-12 DIAGNOSIS — Z94.4 LIVER TRANSPLANTED: ICD-10-CM

## 2017-06-12 PROCEDURE — 93975 VASCULAR STUDY: CPT | Mod: 26,GC,, | Performed by: RADIOLOGY

## 2017-06-12 PROCEDURE — 76705 ECHO EXAM OF ABDOMEN: CPT | Mod: 26,59,GC, | Performed by: RADIOLOGY

## 2017-06-12 PROCEDURE — 93975 VASCULAR STUDY: CPT | Mod: TC

## 2017-06-12 NOTE — TELEPHONE ENCOUNTER
----- Message from Sherry Garza MD sent at 6/12/2017  2:38 PM CDT -----  Needs liver biopsy - please let patient know.

## 2017-06-13 ENCOUNTER — TELEPHONE (OUTPATIENT)
Dept: TRANSPLANT | Facility: CLINIC | Age: 27
End: 2017-06-13

## 2017-06-13 DIAGNOSIS — Z94.4 LIVER TRANSPLANTED: ICD-10-CM

## 2017-06-13 DIAGNOSIS — Z94.4 LIVER TRANSPLANTED: Primary | ICD-10-CM

## 2017-06-13 DIAGNOSIS — R79.89 ABNORMAL LFTS: Primary | ICD-10-CM

## 2017-06-13 NOTE — TELEPHONE ENCOUNTER
Called pt to review u/s and biopsy instructions.    Pt verbalized understanding to fast after midnight Thursday, take medications Friday AM with a small sip of water and report to [facility] at 0800 for biopsy at 1000.  Pt states her mother will be present.  Pt also verbalized understanding that she needs to make sure she takes her BP medication as directed because if her BP is elevated, the biopsy will be cancelled and she will most likely be sent to ED.

## 2017-06-13 NOTE — TELEPHONE ENCOUNTER
"Spoke to pt, discussed Prograf level.  Discussed with pt that her outpatients labs always reflect that she is not taking prograf/ or taking prograf correctly because her level is always undetectable, even with dose increases. Pt states she has been on 6mg BID. Discussed that labs last week were undetectable but labs yesterday indicated her Prograf level was 2.7.  Asked pt to be honest if she restarted Prograf. Pt adamant that she has not missed any doses.  Then she backtracked and said she "may miss a dose or two here and there."  Advised pt that we need to know what she is really taking or not taking so we can safely increase her dose. Pt again adamant that she takes 6mg BID.  Advised pt that if that is her current dose, she needs to take 8mg tonight and then start 7mg BID tomorrow.  Pt will have biopsy Friday and repeat labs Monday with prograf level.  "

## 2017-06-13 NOTE — TELEPHONE ENCOUNTER
----- Message from Sherry Garza MD sent at 6/13/2017  4:12 PM CDT -----  Increase prograf to 7/7 (take 8 mg tonight for a total of 14 mg per day)- please let patient know.

## 2017-06-13 NOTE — TELEPHONE ENCOUNTER
----- Message from Sherry Garza MD sent at 6/13/2017  7:21 AM CDT -----  U/s ok- please let patient know.

## 2017-06-14 ENCOUNTER — OFFICE VISIT (OUTPATIENT)
Dept: NEPHROLOGY | Facility: CLINIC | Age: 27
End: 2017-06-14
Payer: MEDICARE

## 2017-06-14 VITALS
HEART RATE: 81 BPM | BODY MASS INDEX: 21.81 KG/M2 | DIASTOLIC BLOOD PRESSURE: 98 MMHG | WEIGHT: 130.94 LBS | OXYGEN SATURATION: 99 % | HEIGHT: 65 IN | SYSTOLIC BLOOD PRESSURE: 150 MMHG

## 2017-06-14 DIAGNOSIS — N18.5 ANEMIA OF CHRONIC RENAL FAILURE, STAGE 5: ICD-10-CM

## 2017-06-14 DIAGNOSIS — I12.0 HYPERTENSIVE KIDNEY DISEASE WITH END-STAGE RENAL DISEASE: ICD-10-CM

## 2017-06-14 DIAGNOSIS — N18.6 ESRD (END STAGE RENAL DISEASE): Primary | ICD-10-CM

## 2017-06-14 DIAGNOSIS — Z94.4 LIVER REPLACED BY TRANSPLANT: ICD-10-CM

## 2017-06-14 DIAGNOSIS — D63.1 ANEMIA OF CHRONIC RENAL FAILURE, STAGE 5: ICD-10-CM

## 2017-06-14 DIAGNOSIS — N18.6 HYPERTENSIVE KIDNEY DISEASE WITH END-STAGE RENAL DISEASE: ICD-10-CM

## 2017-06-14 DIAGNOSIS — N25.81 HYPERPARATHYROIDISM, SECONDARY RENAL: ICD-10-CM

## 2017-06-14 PROCEDURE — 99212 OFFICE O/P EST SF 10 MIN: CPT | Mod: PBBFAC,PO | Performed by: INTERNAL MEDICINE

## 2017-06-14 PROCEDURE — 99999 PR PBB SHADOW E&M-EST. PATIENT-LVL II: CPT | Mod: PBBFAC,,, | Performed by: INTERNAL MEDICINE

## 2017-06-14 PROCEDURE — 99499 UNLISTED E&M SERVICE: CPT | Mod: S$PBB,,, | Performed by: INTERNAL MEDICINE

## 2017-06-14 RX ORDER — TACROLIMUS 1 MG/1
7 CAPSULE ORAL EVERY 12 HOURS
Qty: 420 CAPSULE | Refills: 1 | Status: SHIPPED | OUTPATIENT
Start: 2017-06-14 | End: 2017-07-12 | Stop reason: DRUGHIGH

## 2017-06-15 ENCOUNTER — TELEPHONE (OUTPATIENT)
Dept: TRANSPLANT | Facility: CLINIC | Age: 27
End: 2017-06-15

## 2017-06-15 DIAGNOSIS — Z94.4 LIVER TRANSPLANTED: Primary | ICD-10-CM

## 2017-06-15 NOTE — TELEPHONE ENCOUNTER
Called pt, no answer. LVM - lab work needed prior to biopsy check-in tomorrow at 7.  Requested return call with any questions or concerns.       Repeat CBC scheduled per radiologist request.

## 2017-06-16 ENCOUNTER — HOSPITAL ENCOUNTER (OUTPATIENT)
Facility: HOSPITAL | Age: 27
Discharge: HOME | End: 2017-06-16
Attending: INTERNAL MEDICINE | Admitting: INTERNAL MEDICINE
Payer: MEDICARE

## 2017-06-16 ENCOUNTER — SURGERY (OUTPATIENT)
Age: 27
End: 2017-06-16

## 2017-06-16 VITALS
WEIGHT: 140 LBS | TEMPERATURE: 98 F | SYSTOLIC BLOOD PRESSURE: 154 MMHG | RESPIRATION RATE: 16 BRPM | HEART RATE: 101 BPM | OXYGEN SATURATION: 100 % | BODY MASS INDEX: 23.32 KG/M2 | HEIGHT: 65 IN | DIASTOLIC BLOOD PRESSURE: 75 MMHG

## 2017-06-16 DIAGNOSIS — R79.89 ABNORMAL LFTS: ICD-10-CM

## 2017-06-16 DIAGNOSIS — Z94.4 LIVER TRANSPLANTED: ICD-10-CM

## 2017-06-16 DIAGNOSIS — K76.89 LIVER DYSFUNCTION: ICD-10-CM

## 2017-06-16 LAB
BASOPHILS # BLD AUTO: 0.01 K/UL
BASOPHILS NFR BLD: 0.2 %
DIFFERENTIAL METHOD: ABNORMAL
EOSINOPHIL # BLD AUTO: 0.6 K/UL
EOSINOPHIL NFR BLD: 12 %
ERYTHROCYTE [DISTWIDTH] IN BLOOD BY AUTOMATED COUNT: 14.1 %
HCT VFR BLD AUTO: 27.3 %
HGB BLD-MCNC: 9.2 G/DL
LYMPHOCYTES # BLD AUTO: 0.7 K/UL
LYMPHOCYTES NFR BLD: 14.8 %
MCH RBC QN AUTO: 26.6 PG
MCHC RBC AUTO-ENTMCNC: 33.7 %
MCV RBC AUTO: 79 FL
MONOCYTES # BLD AUTO: 0.3 K/UL
MONOCYTES NFR BLD: 6.5 %
NEUTROPHILS # BLD AUTO: 3.1 K/UL
NEUTROPHILS NFR BLD: 66.5 %
PLATELET # BLD AUTO: 83 K/UL
PMV BLD AUTO: ABNORMAL FL
RBC # BLD AUTO: 3.46 M/UL
WBC # BLD AUTO: 4.74 K/UL

## 2017-06-16 PROCEDURE — 85025 COMPLETE CBC W/AUTO DIFF WBC: CPT

## 2017-06-16 PROCEDURE — 63600175 PHARM REV CODE 636 W HCPCS: Performed by: RADIOLOGY

## 2017-06-16 PROCEDURE — 88342 IMHCHEM/IMCYTCHM 1ST ANTB: CPT | Mod: 26,,, | Performed by: PATHOLOGY

## 2017-06-16 PROCEDURE — 88307 TISSUE EXAM BY PATHOLOGIST: CPT | Performed by: PATHOLOGY

## 2017-06-16 PROCEDURE — 88341 IMHCHEM/IMCYTCHM EA ADD ANTB: CPT | Mod: 26,,, | Performed by: PATHOLOGY

## 2017-06-16 PROCEDURE — 25000003 PHARM REV CODE 250: Performed by: RADIOLOGY

## 2017-06-16 PROCEDURE — 88313 SPECIAL STAINS GROUP 2: CPT | Mod: 26,,, | Performed by: PATHOLOGY

## 2017-06-16 PROCEDURE — 88307 TISSUE EXAM BY PATHOLOGIST: CPT | Mod: 26,,, | Performed by: PATHOLOGY

## 2017-06-16 RX ORDER — SODIUM CHLORIDE 9 MG/ML
INJECTION, SOLUTION INTRAVENOUS CONTINUOUS
Status: DISCONTINUED | OUTPATIENT
Start: 2017-06-16 | End: 2017-06-16 | Stop reason: HOSPADM

## 2017-06-16 RX ORDER — FENTANYL CITRATE 50 UG/ML
INJECTION, SOLUTION INTRAMUSCULAR; INTRAVENOUS CODE/TRAUMA/SEDATION MEDICATION
Status: COMPLETED | OUTPATIENT
Start: 2017-06-16 | End: 2017-06-16

## 2017-06-16 RX ORDER — LABETALOL HYDROCHLORIDE 5 MG/ML
5 INJECTION, SOLUTION INTRAVENOUS ONCE
Status: COMPLETED | OUTPATIENT
Start: 2017-06-16 | End: 2017-06-16

## 2017-06-16 RX ORDER — HYDRALAZINE HYDROCHLORIDE 20 MG/ML
INJECTION INTRAMUSCULAR; INTRAVENOUS CODE/TRAUMA/SEDATION MEDICATION
Status: COMPLETED | OUTPATIENT
Start: 2017-06-16 | End: 2017-06-16

## 2017-06-16 RX ORDER — MIDAZOLAM HYDROCHLORIDE 1 MG/ML
INJECTION, SOLUTION INTRAMUSCULAR; INTRAVENOUS CODE/TRAUMA/SEDATION MEDICATION
Status: COMPLETED | OUTPATIENT
Start: 2017-06-16 | End: 2017-06-16

## 2017-06-16 RX ADMIN — MIDAZOLAM HYDROCHLORIDE 0.5 MG: 1 INJECTION, SOLUTION INTRAMUSCULAR; INTRAVENOUS at 01:06

## 2017-06-16 RX ADMIN — FENTANYL CITRATE 25 MCG: 50 INJECTION, SOLUTION INTRAMUSCULAR; INTRAVENOUS at 01:06

## 2017-06-16 RX ADMIN — LABETALOL HYDROCHLORIDE 5 MG: 5 INJECTION, SOLUTION INTRAVENOUS at 12:06

## 2017-06-16 RX ADMIN — MIDAZOLAM HYDROCHLORIDE 1.5 MG: 1 INJECTION, SOLUTION INTRAMUSCULAR; INTRAVENOUS at 12:06

## 2017-06-16 RX ADMIN — FENTANYL CITRATE 25 MCG: 50 INJECTION, SOLUTION INTRAMUSCULAR; INTRAVENOUS at 12:06

## 2017-06-16 RX ADMIN — HYDRALAZINE HYDROCHLORIDE 10 MG: 20 INJECTION INTRAMUSCULAR; INTRAVENOUS at 12:06

## 2017-06-16 NOTE — PROGRESS NOTES
Assume care of pt. Pt s/p Liver Bx. Dressing to the right abd is clean dry and intact. BP is 151/91. Sandbag remains in place. Will cont to monitor.

## 2017-06-16 NOTE — PROCEDURES
Radiology Post-Procedure Note    Pre Op Diagnosis: Abnormal LFTs    Post Op Diagnosis: Same    Procedure: Ultrasound guided liver biopsy    Procedure performed by: Sohan Theodore MD    Written Informed Consent Obtained: Yes    Specimen Removed: Yes: Single 16 gauge core biopsy of liver    Estimated Blood Loss: Minimal    Findings: Moderate sedation and local anesthesia were used.    A 16-gauge Monopty biopsy device was used to remove 1 random right hepatic lobe specimen.  This specimen was sent to pathology for further evaluation.      The patient tolerated the procedure well and there were no complications.  Please see Imaging report for further details.      Sohan Felix MD  Radiology

## 2017-06-16 NOTE — DISCHARGE SUMMARY
Radiology Discharge Summary      Hospital Course: No complications    Admit Date: 6/16/2017  Discharge Date: 06/16/2017     Instructions Given to Patient: Yes  Diet: Resume prior diet  Activity: activity as tolerated    Description of Condition on Discharge: Stable  Vital Signs (Most Recent): Temp: 98.3 °F (36.8 °C) (06/16/17 0803)  Pulse: 89 (06/16/17 1330)  Resp: 12 (06/16/17 1330)  BP: (!) 144/99 (06/16/17 1330)  SpO2: 100 % (06/16/17 1330)    Discharge Disposition: Home    Discharge Diagnosis: S/p liver biopsy    Followup: No dedicated follow up with radiology is required. Patient instructed to call if there is any complication, post procedural pain, or signs of infection. Return to PCP for results, as previously scheduled.      Sohan Felix MD  Radiology

## 2017-06-16 NOTE — H&P
Radiology History & Physical      SUBJECTIVE:     Chief Complaint: Liver dysfunction    History of Present Illness:  Holly Patel is a 26 y.o. female who presents for US guided liver biopsy  Past Medical History:   Diagnosis Date    Allergy     Anemia requiring transfusions 2015    ESRD (end stage renal disease) 2015    Hypertension     Kidney disease     Liver transplanted     Malignant hypertension with chronic kidney disease stage IV 2014    Renal disorder      Past Surgical History:   Procedure Laterality Date     SECTION      2     LIVER BIOPSY      LIVER TRANSPLANT  1992    TUBAL LIGATION         Home Meds:   Prior to Admission medications    Medication Sig Start Date End Date Taking? Authorizing Provider   amlodipine (NORVASC) 10 MG tablet Take 1 tablet (10 mg total) by mouth once daily. 14  Yes David Awan MD   labetalol (NORMODYNE) 200 MG tablet Take 1 tablet (200 mg total) by mouth 2 (two) times daily. 17  Yes Viktor Bass MD   predniSONE (DELTASONE) 1 MG tablet Take 1 mg by mouth every other day.   Yes Historical Provider, MD   tacrolimus (PROGRAF) 1 MG Cap Take 7 capsules (7 mg total) by mouth every 12 (twelve) hours. 17 Yes Sherry Garza MD   triamcinolone acetonide 0.1% (KENALOG) 0.1 % ointment AAA on arms, legs, and neck bid x 1-2 wks then prn flares only  Patient taking differently: Apply to affected area(s) on arms, legs, and neck twice daily x 1-2 wks then as needed for flares only 14  Yes Diana Grider MD   cloNIDine (CATAPRES) 0.1 MG tablet Take 1 tablet (0.1 mg total) by mouth 2 (two) times daily. 9/18/15 9/17/16  Rupinder Bustillo MD     Anticoagulants/Antiplatelets: no anticoagulation    Allergies:   Review of patient's allergies indicates:   Allergen Reactions    Chloral hydrate      Other reaction(s): Hallucinations  Other reaction(s): Hives    Hydrocodone      Sedation History:  no adverse  reactions    Review of Systems:   Hematological: no known coagulopathies  Respiratory: no shortness of breath  Cardiovascular: no chest pain  Gastrointestinal: no abdominal pain  Genito-Urinary: no dysuria  Musculoskeletal: negative  Neurological: no TIA or stroke symptoms         OBJECTIVE:     Vital Signs (Most Recent)  Temp: 98.3 °F (36.8 °C) (06/16/17 0803)  Pulse: 94 (06/16/17 0803)  Resp: 17 (06/16/17 0803)  BP: (!) 148/103 (06/16/17 0803)  SpO2: 99 % (06/16/17 0803)    Physical Exam:  ASA: 2  Mallampati: 1    General: no acute distress  Mental Status: alert and oriented to person, place and time  HEENT: normocephalic, atraumatic  Chest: unlabored breathing  Heart: regular heart rate  Abdomen: nondistended  Extremity: moves all extremities    Laboratory  Lab Results   Component Value Date    INR 1.0 06/12/2017       Lab Results   Component Value Date    WBC 4.74 06/16/2017    HGB 9.2 (L) 06/16/2017    HCT 27.3 (L) 06/16/2017    MCV 79 (L) 06/16/2017    PLT 87 (L) 06/12/2017      Lab Results   Component Value Date     06/12/2017     06/12/2017    K 3.8 06/12/2017     06/12/2017    CO2 22 (L) 06/12/2017    BUN 60 (H) 06/12/2017    CREATININE 7.9 (H) 06/12/2017    CALCIUM 8.9 06/12/2017    MG 1.8 04/12/2016     (H) 06/12/2017     (H) 06/12/2017    ALBUMIN 3.1 (L) 06/12/2017    BILITOT 1.2 (H) 06/12/2017    BILIDIR 0.2 04/20/2016       ASSESSMENT/PLAN:     Sedation Plan: moderate  Patient will undergo US guided liver biopsy.    Sohan Felix MD  Radiology

## 2017-06-16 NOTE — PROGRESS NOTES
Patient stable, tolerated procedure well. Discharge instruction, and follow up care reviewed. Patient verbalized understanding, and denies further questions. Provided with ROCU and after hours number. Patient transported via wheelchair.

## 2017-06-16 NOTE — DISCHARGE INSTRUCTIONS
For scheduling: Call [facility] at [phone]    For questions or concerns call: CHRISTINA MON-FRI 8 AM- 5PM [phone]. Radiology resident on call [phone].    For immediate concerns that are not emergent, you may call our radiology clinic at: [phone]

## 2017-06-16 NOTE — SIGNIFICANT EVENT
Of note the platelet count was noted to be 83,000 by the lab when called for the result, but the result was not yet populated to epic

## 2017-06-19 ENCOUNTER — TELEPHONE (OUTPATIENT)
Dept: TRANSPLANT | Facility: CLINIC | Age: 27
End: 2017-06-19

## 2017-06-20 ENCOUNTER — TELEPHONE (OUTPATIENT)
Dept: TRANSPLANT | Facility: CLINIC | Age: 27
End: 2017-06-20

## 2017-06-20 NOTE — TELEPHONE ENCOUNTER
----- Message from Lei Pinedo MD sent at 6/19/2017  3:06 PM CDT -----  Results reviewed  Is she taking her tac?

## 2017-06-21 ENCOUNTER — LAB VISIT (OUTPATIENT)
Dept: LAB | Facility: HOSPITAL | Age: 27
End: 2017-06-21
Attending: INTERNAL MEDICINE
Payer: MEDICARE

## 2017-06-21 DIAGNOSIS — Z94.4 LIVER TRANSPLANTED: ICD-10-CM

## 2017-06-21 LAB
ALBUMIN SERPL BCP-MCNC: 2.8 G/DL
ALP SERPL-CCNC: 783 U/L
ALT SERPL W/O P-5'-P-CCNC: 103 U/L
ANION GAP SERPL CALC-SCNC: 10 MMOL/L
AST SERPL-CCNC: 87 U/L
BASOPHILS # BLD AUTO: 0.01 K/UL
BASOPHILS NFR BLD: 0.2 %
BILIRUB SERPL-MCNC: 1 MG/DL
BUN SERPL-MCNC: 45 MG/DL
CALCIUM SERPL-MCNC: 8.7 MG/DL
CHLORIDE SERPL-SCNC: 105 MMOL/L
CO2 SERPL-SCNC: 23 MMOL/L
CREAT SERPL-MCNC: 7.9 MG/DL
DIFFERENTIAL METHOD: ABNORMAL
EOSINOPHIL # BLD AUTO: 0.5 K/UL
EOSINOPHIL NFR BLD: 12 %
ERYTHROCYTE [DISTWIDTH] IN BLOOD BY AUTOMATED COUNT: 14.4 %
EST. GFR  (AFRICAN AMERICAN): 7.4 ML/MIN/1.73 M^2
EST. GFR  (NON AFRICAN AMERICAN): 6.4 ML/MIN/1.73 M^2
GLUCOSE SERPL-MCNC: 113 MG/DL
HCT VFR BLD AUTO: 27.1 %
HGB BLD-MCNC: 9 G/DL
LYMPHOCYTES # BLD AUTO: 0.7 K/UL
LYMPHOCYTES NFR BLD: 16.9 %
MCH RBC QN AUTO: 26.6 PG
MCHC RBC AUTO-ENTMCNC: 33.2 %
MCV RBC AUTO: 80 FL
MONOCYTES # BLD AUTO: 0.3 K/UL
MONOCYTES NFR BLD: 5.9 %
NEUTROPHILS # BLD AUTO: 2.8 K/UL
NEUTROPHILS NFR BLD: 65 %
PLATELET # BLD AUTO: 105 K/UL
PMV BLD AUTO: 9.6 FL
POTASSIUM SERPL-SCNC: 4 MMOL/L
PROT SERPL-MCNC: 6.8 G/DL
RBC # BLD AUTO: 3.38 M/UL
SODIUM SERPL-SCNC: 138 MMOL/L
WBC # BLD AUTO: 4.26 K/UL

## 2017-06-21 PROCEDURE — 85025 COMPLETE CBC W/AUTO DIFF WBC: CPT

## 2017-06-21 PROCEDURE — 80197 ASSAY OF TACROLIMUS: CPT

## 2017-06-21 PROCEDURE — 36415 COLL VENOUS BLD VENIPUNCTURE: CPT | Mod: PO

## 2017-06-21 PROCEDURE — 80053 COMPREHEN METABOLIC PANEL: CPT

## 2017-06-22 ENCOUNTER — CONFERENCE (OUTPATIENT)
Dept: TRANSPLANT | Facility: CLINIC | Age: 27
End: 2017-06-22

## 2017-06-22 LAB — TACROLIMUS BLD-MCNC: <1.5 NG/ML

## 2017-06-27 ENCOUNTER — TELEPHONE (OUTPATIENT)
Dept: TRANSPLANT | Facility: CLINIC | Age: 27
End: 2017-06-27

## 2017-07-03 ENCOUNTER — LAB VISIT (OUTPATIENT)
Dept: LAB | Facility: HOSPITAL | Age: 27
End: 2017-07-03
Attending: INTERNAL MEDICINE
Payer: MEDICARE

## 2017-07-03 DIAGNOSIS — Z94.4 LIVER TRANSPLANTED: ICD-10-CM

## 2017-07-03 LAB
ALBUMIN SERPL BCP-MCNC: 2.7 G/DL
ALP SERPL-CCNC: 752 U/L
ALT SERPL W/O P-5'-P-CCNC: 59 U/L
ANION GAP SERPL CALC-SCNC: 8 MMOL/L
AST SERPL-CCNC: 56 U/L
BASOPHILS # BLD AUTO: 0.01 K/UL
BASOPHILS NFR BLD: 0.2 %
BILIRUB SERPL-MCNC: 0.8 MG/DL
BUN SERPL-MCNC: 57 MG/DL
CALCIUM SERPL-MCNC: 8.8 MG/DL
CHLORIDE SERPL-SCNC: 108 MMOL/L
CO2 SERPL-SCNC: 19 MMOL/L
CREAT SERPL-MCNC: 9.2 MG/DL
DIFFERENTIAL METHOD: ABNORMAL
EOSINOPHIL # BLD AUTO: 0.4 K/UL
EOSINOPHIL NFR BLD: 7.3 %
ERYTHROCYTE [DISTWIDTH] IN BLOOD BY AUTOMATED COUNT: 15.5 %
EST. GFR  (AFRICAN AMERICAN): 6.1 ML/MIN/1.73 M^2
EST. GFR  (NON AFRICAN AMERICAN): 5.3 ML/MIN/1.73 M^2
GLUCOSE SERPL-MCNC: 80 MG/DL
HCT VFR BLD AUTO: 28.3 %
HGB BLD-MCNC: 9.2 G/DL
LYMPHOCYTES # BLD AUTO: 0.9 K/UL
LYMPHOCYTES NFR BLD: 18.7 %
MCH RBC QN AUTO: 26.4 PG
MCHC RBC AUTO-ENTMCNC: 32.5 %
MCV RBC AUTO: 81 FL
MONOCYTES # BLD AUTO: 0.3 K/UL
MONOCYTES NFR BLD: 6.8 %
NEUTROPHILS # BLD AUTO: 3.2 K/UL
NEUTROPHILS NFR BLD: 67 %
PLATELET # BLD AUTO: 89 K/UL
PMV BLD AUTO: ABNORMAL FL
POTASSIUM SERPL-SCNC: 4.1 MMOL/L
PROT SERPL-MCNC: 6.9 G/DL
RBC # BLD AUTO: 3.49 M/UL
SODIUM SERPL-SCNC: 135 MMOL/L
WBC # BLD AUTO: 4.82 K/UL

## 2017-07-03 PROCEDURE — 80053 COMPREHEN METABOLIC PANEL: CPT

## 2017-07-03 PROCEDURE — 36415 COLL VENOUS BLD VENIPUNCTURE: CPT | Mod: PO

## 2017-07-03 PROCEDURE — 80197 ASSAY OF TACROLIMUS: CPT

## 2017-07-03 PROCEDURE — 85025 COMPLETE CBC W/AUTO DIFF WBC: CPT

## 2017-07-04 LAB — TACROLIMUS BLD-MCNC: 4.9 NG/ML

## 2017-07-07 ENCOUNTER — TELEPHONE (OUTPATIENT)
Dept: TRANSPLANT | Facility: CLINIC | Age: 27
End: 2017-07-07

## 2017-07-07 NOTE — TELEPHONE ENCOUNTER
Spoke with pt, reviewed lab results and that no medication changes are needed at this time. Pt will repeat labs Tuesday prior to clinic visit.

## 2017-07-07 NOTE — TELEPHONE ENCOUNTER
----- Message from Lei Pinedo MD sent at 7/7/2017 12:39 PM CDT -----  No changes.    Repeat weekly at the moment    ----- Message -----  From: Violeta rFancis RN  Sent: 7/7/2017  12:38 PM  To: Lei Pinedo MD    These went to Dr. Garza.    Notice she has a prograf level.    Violeta Francis RN, BSN  Liver Transplant Coordinator

## 2017-07-11 ENCOUNTER — LAB VISIT (OUTPATIENT)
Dept: LAB | Facility: HOSPITAL | Age: 27
End: 2017-07-11
Attending: INTERNAL MEDICINE
Payer: MEDICARE

## 2017-07-11 ENCOUNTER — OFFICE VISIT (OUTPATIENT)
Dept: TRANSPLANT | Facility: CLINIC | Age: 27
End: 2017-07-11
Payer: MEDICARE

## 2017-07-11 ENCOUNTER — TELEPHONE (OUTPATIENT)
Dept: TRANSPLANT | Facility: CLINIC | Age: 27
End: 2017-07-11

## 2017-07-11 ENCOUNTER — SOCIAL WORK (OUTPATIENT)
Dept: TRANSPLANT | Facility: CLINIC | Age: 27
End: 2017-07-11
Payer: MEDICARE

## 2017-07-11 VITALS
HEART RATE: 88 BPM | DIASTOLIC BLOOD PRESSURE: 131 MMHG | OXYGEN SATURATION: 100 % | BODY MASS INDEX: 21.67 KG/M2 | HEIGHT: 65 IN | TEMPERATURE: 97 F | SYSTOLIC BLOOD PRESSURE: 212 MMHG | WEIGHT: 130.06 LBS | RESPIRATION RATE: 16 BRPM

## 2017-07-11 DIAGNOSIS — N18.6 END STAGE RENAL DISEASE ON DIALYSIS: ICD-10-CM

## 2017-07-11 DIAGNOSIS — Z94.4 LIVER TRANSPLANTED: ICD-10-CM

## 2017-07-11 DIAGNOSIS — I15.0 RENOVASCULAR HYPERTENSION: Primary | ICD-10-CM

## 2017-07-11 DIAGNOSIS — Z99.2 END STAGE RENAL DISEASE ON DIALYSIS: ICD-10-CM

## 2017-07-11 DIAGNOSIS — T86.41 CHRONIC REJECTION OF LIVER TRANSPLANT: ICD-10-CM

## 2017-07-11 DIAGNOSIS — Z94.4 LIVER REPLACED BY TRANSPLANT: ICD-10-CM

## 2017-07-11 LAB
ALBUMIN SERPL BCP-MCNC: 2.8 G/DL
ALP SERPL-CCNC: 688 U/L
ALT SERPL W/O P-5'-P-CCNC: 47 U/L
ANION GAP SERPL CALC-SCNC: 10 MMOL/L
AST SERPL-CCNC: 53 U/L
BASOPHILS # BLD AUTO: 0 K/UL
BASOPHILS NFR BLD: 0 %
BILIRUB SERPL-MCNC: 0.7 MG/DL
BUN SERPL-MCNC: 55 MG/DL
CALCIUM SERPL-MCNC: 8.9 MG/DL
CHLORIDE SERPL-SCNC: 110 MMOL/L
CO2 SERPL-SCNC: 18 MMOL/L
CREAT SERPL-MCNC: 8.9 MG/DL
DIFFERENTIAL METHOD: ABNORMAL
EOSINOPHIL # BLD AUTO: 0.5 K/UL
EOSINOPHIL NFR BLD: 10.9 %
ERYTHROCYTE [DISTWIDTH] IN BLOOD BY AUTOMATED COUNT: 15.7 %
EST. GFR  (AFRICAN AMERICAN): 6.4 ML/MIN/1.73 M^2
EST. GFR  (NON AFRICAN AMERICAN): 5.5 ML/MIN/1.73 M^2
GLUCOSE SERPL-MCNC: 90 MG/DL
HCT VFR BLD AUTO: 26.6 %
HGB BLD-MCNC: 8.8 G/DL
LYMPHOCYTES # BLD AUTO: 0.7 K/UL
LYMPHOCYTES NFR BLD: 15.9 %
MCH RBC QN AUTO: 26.6 PG
MCHC RBC AUTO-ENTMCNC: 33.1 %
MCV RBC AUTO: 80 FL
MONOCYTES # BLD AUTO: 0.2 K/UL
MONOCYTES NFR BLD: 5.7 %
NEUTROPHILS # BLD AUTO: 2.8 K/UL
NEUTROPHILS NFR BLD: 67.5 %
PLATELET # BLD AUTO: 82 K/UL
PMV BLD AUTO: ABNORMAL FL
POTASSIUM SERPL-SCNC: 4.6 MMOL/L
PROT SERPL-MCNC: 7.1 G/DL
RBC # BLD AUTO: 3.31 M/UL
SODIUM SERPL-SCNC: 138 MMOL/L
TACROLIMUS BLD-MCNC: 3.2 NG/ML
WBC # BLD AUTO: 4.22 K/UL

## 2017-07-11 PROCEDURE — 99214 OFFICE O/P EST MOD 30 MIN: CPT | Mod: S$PBB,,, | Performed by: INTERNAL MEDICINE

## 2017-07-11 PROCEDURE — 99999 PR PBB SHADOW E&M-EST. PATIENT-LVL III: CPT | Mod: PBBFAC,,, | Performed by: INTERNAL MEDICINE

## 2017-07-11 PROCEDURE — 99213 OFFICE O/P EST LOW 20 MIN: CPT | Mod: PBBFAC | Performed by: INTERNAL MEDICINE

## 2017-07-11 NOTE — LETTER
July 16, 2017        Enrrique Moise  [address]  [facility] [address]  Phone: [phone]  Fax: [phone]             [facility]  [address]  [facility] [address]  Phone: [phone]   Patient: Holly Patel   MR Number: [id number]   YOB: 1990   Date of Visit: 7/11/2017       Dear Dr. Enrrique Moise    Thank you for referring Holly Patel to me for evaluation. Attached you will find relevant portions of my assessment and plan of care.    If you have questions, please do not hesitate to call me. I look forward to following Holly Patel along with you.    Sincerely,    Lei Pinedo MD    Enclosure    If you would like to receive this communication electronically, please contact [email or web address] or [phone] to request [employer] [employer] access.    [employer] is a tool which provides read-only access to select patient information with whom you have a relationship. Its easy to use and provides real time access to review your patients record including encounter summaries, notes, results, and demographic information.    If you feel you have received this communication in error or would no longer like to receive these types of communications, please e-mail [email or web address]

## 2017-07-11 NOTE — PROGRESS NOTES
Subjective:       Patient ID: Holly Patel is a 26 y.o. female.    Chief Complaint: Liver Transplant Follow-up    HPI  I saw this 26 year old  lady in the liver transplant clinic. She is keeping well but she continues to have uncontrolled hypertension and end stage renal disease and is now on dialysis- home HD.    She gets her blood tests done irregularly and despite being on high dose tacrolimus, she has always had an undetectable or very low tacrolimus level.   Until now, she had been adamant that she took her medications. Today she admitted that she forgets to take it at least a couple of times each week.    She had high LFTs in 2017 and a liver biopsy showed stage 2-3 fibrosis as well as evidence of chronic rejection.    She is currently on Tac 7mg BID and finally appears to be taking it more regularly.    OLT  aged 2 for hemangioendothelioma  On HD since Oct 2015, home HD since 2016.  Seen by Social Work and deemed that she needs to show compliance before she can be evaluated for kidney Tx.    AST 42  ALT 43      BP today is  227/127      Abdominal US: 2016  1.  Appropriate hepatic vascular flow.  2.  Satisfactory post op status.      Lab Results   Component Value Date    ALT 43 2017    AST 42 (H) 2017     (H) 2012    ALKPHOS 732 (H) 2017    BILITOT 0.8 2017     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    Renal disorder      Past Surgical History:   Procedure Laterality Date     SECTION      2     LIVER BIOPSY      LIVER TRANSPLANT  1992    TUBAL LIGATION       Current Outpatient Prescriptions   Medication Sig    amlodipine (NORVASC) 10 MG tablet Take 1  tablet (10 mg total) by mouth once daily.    cloNIDine (CATAPRES) 0.1 MG tablet Take 1 tablet (0.1 mg total) by mouth 2 (two) times daily.    labetalol (NORMODYNE) 200 MG tablet Take 1 tablet (200 mg total) by mouth 2 (two) times daily.    predniSONE (DELTASONE) 1 MG tablet Take 1 mg by mouth every other day.    triamcinolone acetonide 0.1% (KENALOG) 0.1 % ointment AAA on arms, legs, and neck bid x 1-2 wks then prn flares only (Patient taking differently: Apply to affected area(s) on arms, legs, and neck twice daily x 1-2 wks then as needed for flares only)    amoxicillin (AMOXIL) 875 MG tablet Take 1 tablet (875 mg total) by mouth 2 (two) times daily.    tacrolimus (PROGRAF) 1 MG Cap Take 8 capsules (8 mg total) by mouth every 12 (twelve) hours.     No current facility-administered medications for this visit.          Review of Systems   Constitutional: Negative for activity change, appetite change, chills, fatigue, fever and unexpected weight change.   HENT: Negative for ear pain, hearing loss, nosebleeds, sore throat and trouble swallowing.    Eyes: Negative for redness and visual disturbance.   Respiratory: Negative for cough, chest tightness, shortness of breath and wheezing.    Cardiovascular: Negative for chest pain and palpitations.   Gastrointestinal: Negative for abdominal distention, abdominal pain, blood in stool, constipation, diarrhea, nausea and vomiting.   Genitourinary: Negative for difficulty urinating, dysuria, frequency, hematuria and urgency.   Musculoskeletal: Negative for arthralgias, back pain, gait problem, joint swelling and myalgias.   Skin: Negative for rash.   Neurological: Negative for tremors, seizures, speech difficulty, weakness and headaches.   Hematological: Negative for adenopathy.   Psychiatric/Behavioral: Negative for confusion, decreased concentration and sleep disturbance. The patient is not nervous/anxious.          Objective:    BP (!) 212/131 (BP Location: Right arm,  "Patient Position: Sitting, BP Method: Automatic)   Pulse 88   Temp 97.4 °F (36.3 °C) (Oral)   Resp 16   Ht 5' 5.35" (1.66 m)   Wt 59 kg (130 lb 1.1 oz)   LMP 06/12/2017   SpO2 100%   BMI 21.41 kg/m²     Physical Exam   Constitutional: She appears well-developed and well-nourished. No distress.   HENT:   Head: Normocephalic.   Eyes: Pupils are equal, round, and reactive to light.   Neck: No JVD present. No tracheal deviation present. No thyromegaly present.   Cardiovascular: Normal rate, regular rhythm and normal heart sounds.    No murmur heard.  Pulmonary/Chest: Effort normal and breath sounds normal. No stridor.   Abdominal: Soft. Bowel sounds are normal. She exhibits no distension. There is no tenderness.   Musculoskeletal: She exhibits no edema.   Lymphadenopathy:        Head (right side): No submental, no submandibular, no tonsillar, no preauricular, no posterior auricular and no occipital adenopathy present.        Head (left side): No submental, no submandibular, no tonsillar, no preauricular, no posterior auricular and no occipital adenopathy present.     She has no cervical adenopathy.     She has no axillary adenopathy.   Neurological: She is alert. She has normal strength. She is not disoriented. No cranial nerve deficit or sensory deficit.   Skin: Skin is intact. No cyanosis.       Assessment:       1. Renovascular hypertension    2. Chronic rejection of liver transplant    3. End stage renal disease on dialysis    4. Liver replaced by transplant        Plan:   1. Uncontrolled hypertension- nephrologist adjusts her medications  2. Continue with same dose tac and adjust according to levels. I warned her that if she remains non compliant, she will lose her liver from chronic rejection and will likely not get another transplant.    Clinic in 3 months..    Labs every 2 weeks.    "

## 2017-07-11 NOTE — TELEPHONE ENCOUNTER
SW [place] [facility] [place] [phone] to complete a HD compliance check prior to pt clinic visit on today.  This SW spoke with EDMOND Aldana who reported the patient is on home hemo and is seen in the clinic by nurse and Dr. Bass once a month with all her treatment sheets.  Per Katya with in the past the three months the patient has been very compliant with treatment.  Katya reported her treatment reports match up and she is doing well from a home dialysis standpoint.      Katya reported per treatment report the patient has not missed any treatments and she completes her full running time without ending early.  SW verbalized understanding of the information reviewed. SW remains available.

## 2017-07-11 NOTE — PROGRESS NOTES
Compliance Check:    Pt was scheduled due to compliance concerns with taking her immuno suppressants as prescribed. Per pt's nurse coordinator, PERRY Francis RN, pt is showing chronic rejection and prograf levels are not therapeutic. Per PERRY Francis, pt continues to insist that she does take her immuno suppressants as prescribed.     Pt presented to clinic with her mother and two children. Pt presented as alert, oriented x4, pleasant, good eye contact, and cooperative. SW discussed pt's compliance and pt insisted that she has been taking her immuno suppressants. SW requested that pt's mother and children leave the room in order discuss risks and potential outcomes if pt is not taking immuno suppressants as prescribed. SW rephrased the concerns and asked if pt ever forgets her medication. Pt stated that she does forget to take her medication about twice per week. SW advised pt that even if does not miss a dose on purpose, it is still putting her life at risk and seen as non-compliant. Pt reported she did not think of it from that perspective and stated that she understands now.     Pt became tearful and stated that she wants to be around for her children but stated that it is very difficult for her to keep up with all of her responsibilities due to raising her children on her own and going through HD 5 times per week. SW provided validation, emotional support, and discussed the importance of pt taking responsibility for her own health. SW and pt explored different solutions to help her remember her medication. Pt was able to identify a new way of reminding herself and agreed to start today. Pt agreed to contact SW if this method is not working and explore other ways to keep her up to date on medications.     SW assessed for mental health issues and coping. Pt disclosed that she sometimes feels like giving up due to frequent HD. SW reminded pt that she has been turned down for kidney transplant in the past due to  non-compliance with medication and explained that if she takes her medication she is more likely to receive a kidney transplant and get off of HD. Pt expressed that she had not thought about that and expressed understanding of the importance of taking medication. Pt did express understanding that if she does not take medication this puts her at risk of death. Pt and SW explored different ways for pt to receive support in order to stay on track. Pt agreed that mental health services would be helpful with feelings of depression around being on HD. SW agreed to send pt mental health resources in her area. Pt agreed to call once SW sent them to her. SW provided contact information and reminded pt that SW is here to support pt. Pt expressed understanding and in agreement. Pt denied any other needs at this time and expressed gratitude for SW listening to pt.    SW notified pt's nurse coordinator, PERRY Francis, RN of the above conversation. PERRY Francis agreed to follow up with pt in about a week to see how new plan is working and if she enrolled in mental health tx. SW remains available.

## 2017-07-12 DIAGNOSIS — Z94.4 LIVER TRANSPLANTED: ICD-10-CM

## 2017-07-12 RX ORDER — TACROLIMUS 1 MG/1
8 CAPSULE ORAL EVERY 12 HOURS
Qty: 480 CAPSULE | Refills: 11 | Status: SHIPPED | OUTPATIENT
Start: 2017-07-12 | End: 2017-08-29 | Stop reason: SDUPTHER

## 2017-07-12 NOTE — TELEPHONE ENCOUNTER
----- Message from Lei Pinedo MD sent at 7/12/2017 10:03 AM CDT -----  Results reviewed  Increase tac to 8/8

## 2017-07-12 NOTE — TELEPHONE ENCOUNTER
Spoke with pt, reviewed labs and that she needs to increase tac to 8mg BID starting tonight. Requested pt repeat labs 7/24/17.     New prescription sent.

## 2017-07-14 ENCOUNTER — HOSPITAL ENCOUNTER (EMERGENCY)
Facility: HOSPITAL | Age: 27
Discharge: HOME | End: 2017-07-14
Attending: EMERGENCY MEDICINE
Payer: MEDICARE

## 2017-07-14 VITALS
HEART RATE: 80 BPM | OXYGEN SATURATION: 97 % | SYSTOLIC BLOOD PRESSURE: 198 MMHG | BODY MASS INDEX: 23.99 KG/M2 | WEIGHT: 144 LBS | TEMPERATURE: 99 F | RESPIRATION RATE: 18 BRPM | DIASTOLIC BLOOD PRESSURE: 120 MMHG | HEIGHT: 65 IN

## 2017-07-14 DIAGNOSIS — I88.9 LYMPHADENITIS: Primary | ICD-10-CM

## 2017-07-14 LAB
ALBUMIN SERPL BCP-MCNC: 3.1 G/DL
ALP SERPL-CCNC: 732 U/L
ALT SERPL W/O P-5'-P-CCNC: 43 U/L
ANION GAP SERPL CALC-SCNC: 10 MMOL/L
AST SERPL-CCNC: 42 U/L
BASOPHILS # BLD AUTO: 0.01 K/UL
BASOPHILS NFR BLD: 0.2 %
BILIRUB SERPL-MCNC: 0.8 MG/DL
BUN SERPL-MCNC: 61 MG/DL
CALCIUM SERPL-MCNC: 9 MG/DL
CHLORIDE SERPL-SCNC: 108 MMOL/L
CO2 SERPL-SCNC: 19 MMOL/L
CREAT SERPL-MCNC: 9.4 MG/DL
DEPRECATED S PYO AG THROAT QL EIA: NEGATIVE
DIFFERENTIAL METHOD: ABNORMAL
EOSINOPHIL # BLD AUTO: 0.4 K/UL
EOSINOPHIL NFR BLD: 9.3 %
ERYTHROCYTE [DISTWIDTH] IN BLOOD BY AUTOMATED COUNT: 15.4 %
EST. GFR  (AFRICAN AMERICAN): 6 ML/MIN/1.73 M^2
EST. GFR  (NON AFRICAN AMERICAN): 5.2 ML/MIN/1.73 M^2
GLUCOSE SERPL-MCNC: 98 MG/DL
HCT VFR BLD AUTO: 27.7 %
HETEROPH AB SERPL QL IA: NEGATIVE
HGB BLD-MCNC: 9.4 G/DL
INR PPP: 1
LYMPHOCYTES # BLD AUTO: 0.8 K/UL
LYMPHOCYTES NFR BLD: 16 %
MCH RBC QN AUTO: 27.3 PG
MCHC RBC AUTO-ENTMCNC: 33.9 %
MCV RBC AUTO: 81 FL
MONOCYTES # BLD AUTO: 0.2 K/UL
MONOCYTES NFR BLD: 3.8 %
NEUTROPHILS # BLD AUTO: 3.3 K/UL
NEUTROPHILS NFR BLD: 70.1 %
PLATELET # BLD AUTO: 94 K/UL
PMV BLD AUTO: 9.3 FL
POTASSIUM SERPL-SCNC: 4.5 MMOL/L
PROT SERPL-MCNC: 7.6 G/DL
PROTHROMBIN TIME: 10.5 SEC
RBC # BLD AUTO: 3.44 M/UL
SODIUM SERPL-SCNC: 137 MMOL/L
WBC # BLD AUTO: 4.75 K/UL

## 2017-07-14 PROCEDURE — 25000003 PHARM REV CODE 250: Performed by: PHYSICIAN ASSISTANT

## 2017-07-14 PROCEDURE — 85610 PROTHROMBIN TIME: CPT

## 2017-07-14 PROCEDURE — 86308 HETEROPHILE ANTIBODY SCREEN: CPT

## 2017-07-14 PROCEDURE — 87880 STREP A ASSAY W/OPTIC: CPT

## 2017-07-14 PROCEDURE — 99284 EMERGENCY DEPT VISIT MOD MDM: CPT | Mod: ,,, | Performed by: PHYSICIAN ASSISTANT

## 2017-07-14 PROCEDURE — 80053 COMPREHEN METABOLIC PANEL: CPT

## 2017-07-14 PROCEDURE — 87081 CULTURE SCREEN ONLY: CPT

## 2017-07-14 PROCEDURE — 99283 EMERGENCY DEPT VISIT LOW MDM: CPT

## 2017-07-14 PROCEDURE — 85025 COMPLETE CBC W/AUTO DIFF WBC: CPT

## 2017-07-14 RX ORDER — AMOXICILLIN 500 MG/1
500 CAPSULE ORAL EVERY 8 HOURS
Qty: 21 CAPSULE | Refills: 0 | Status: SHIPPED | OUTPATIENT
Start: 2017-07-14 | End: 2017-07-14

## 2017-07-14 RX ORDER — CLONIDINE HYDROCHLORIDE 0.1 MG/1
0.1 TABLET ORAL
Status: COMPLETED | OUTPATIENT
Start: 2017-07-14 | End: 2017-07-14

## 2017-07-14 RX ORDER — AMOXICILLIN 875 MG/1
875 TABLET, FILM COATED ORAL 2 TIMES DAILY
Qty: 14 TABLET | Refills: 0 | Status: SHIPPED | OUTPATIENT
Start: 2017-07-14 | End: 2017-07-21

## 2017-07-14 RX ORDER — AMOXICILLIN 500 MG/1
500 CAPSULE ORAL EVERY 8 HOURS
Qty: 21 CAPSULE | Refills: 0 | Status: SHIPPED | OUTPATIENT
Start: 2017-07-14 | End: 2017-07-14 | Stop reason: CLARIF

## 2017-07-14 RX ADMIN — CLONIDINE HYDROCHLORIDE 0.1 MG: 0.1 TABLET ORAL at 11:07

## 2017-07-14 NOTE — ED TRIAGE NOTES
Patient states pain  to right ear x 1 month, woke up today with facial and neck swelling. Denies SOB, sore throat, difficulty swallowing or fever.  Kidney and liver transplant as a child, on dialysis. Home dialysis due today. On Prednisone QOD.

## 2017-07-14 NOTE — ED PROVIDER NOTES
Encounter Date: 2017    SCRIBE #1 NOTE: I, Adriane Bruce, am scribing for, and in the presence of,  Dr. Byrne. I have scribed the following portions of the note - the APC attestation.       History     Chief Complaint   Patient presents with    Facial Swelling    Otalgia     Patient is a 26-year-old female with a past medical history of kidney/liver transplant on home hemodialysis and on prednisone and Prograf who presents to the ED with right ear pain and face/neck swelling.  Patient states that she has been having right ear pain for one month.  She also has associated nasal congestion and rhinorrhea.  She denies any fever or chills, cough, chest pain, or SOB.  Patient states this morning, she noticed swelling to her neck and jaw.  She denies any pain to the area or dental pain.          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   HENT: Positive for congestion, ear pain (right) and rhinorrhea. Negative for dental problem, postnasal drip, sore throat, trouble swallowing and voice change.    Eyes:  Negative for visual disturbance.   Respiratory: Negative for cough and shortness of breath.    Cardiovascular: Negative for chest pain.   Gastrointestinal: Negative for abdominal pain and nausea.   Endocrine: Negative for polyuria.   Genitourinary: Negative for dysuria.   Musculoskeletal: Negative for back pain.   Skin: Negative for rash.   Allergic/Immunologic: Positive for immunocompromised state.   Neurological: Negative for weakness and headaches.   Hematological: Does not bruise/bleed easily.       Physical Exam     Initial Vitals [07/14/17 0859]   BP Pulse Resp Temp SpO2   (!) 188/102 99 18 98.6 °F (37 °C) 98 %      MAP       130.67         Physical Exam    Vitals reviewed.  Constitutional: She appears well-developed and well-nourished. She is not diaphoretic.   Healthy nontoxic appearing young female in NAD   HENT:   Head: Normocephalic and atraumatic.   Right Ear: Hearing, tympanic membrane, external ear and ear canal normal.   Left Ear: Hearing, tympanic membrane, external ear and ear canal normal.   Nose: Nose normal.   Mouth/Throat: No oropharyngeal exudate, posterior oropharyngeal edema, posterior oropharyngeal erythema or tonsillar abscesses.   Eyes: Conjunctivae and EOM are normal.   Neck: Normal range of motion.   Cardiovascular: Normal rate, regular rhythm and normal heart sounds. Exam reveals no friction rub.    No murmur heard.  Pulmonary/Chest: Breath sounds normal. No respiratory distress. She has no wheezes. She has no rales.   Abdominal: Soft. Bowel sounds are normal. She exhibits no distension. There is no tenderness. There is no rebound.   Musculoskeletal: Normal range of motion.   Lymphadenopathy:     She has cervical adenopathy (nontender).        Right cervical: Superficial cervical adenopathy present.        Left cervical: Superficial cervical adenopathy present.   Neurological: She is alert and oriented to person, place, and time. She has normal strength. No sensory deficit.   Skin: Skin  is warm and dry. No erythema. No pallor.   Psychiatric: She has a normal mood and affect. Her behavior is normal. Judgment and thought content normal.         ED Course   Procedures  Labs Reviewed   CBC W/ AUTO DIFFERENTIAL - Abnormal; Notable for the following:        Result Value    RBC 3.44 (*)     Hemoglobin 9.4 (*)     Hematocrit 27.7 (*)     MCV 81 (*)     RDW 15.4 (*)     Platelets 94 (*)     Lymph # 0.8 (*)     Mono # 0.2 (*)     Lymph% 16.0 (*)     Mono% 3.8 (*)     Eosinophil% 9.3 (*)     All other components within normal limits   COMPREHENSIVE METABOLIC PANEL - Abnormal; Notable for the following:     CO2 19 (*)     BUN, Bld 61 (*)     Creatinine 9.4 (*)     Albumin 3.1 (*)     Alkaline Phosphatase 732 (*)     AST 42 (*)     eGFR if  6.0 (*)     eGFR if non  5.2 (*)     All other components within normal limits   THROAT SCREEN, RAPID   CULTURE, STREP A,  THROAT   PROTIME-INR   HETEROPHILE AB SCREEN             Medical Decision Making:   History:   Old Medical Records: I decided to obtain old medical records.  Clinical Tests:   Lab Tests: Ordered and Reviewed       APC / Resident Notes:   DDX includes but is not limited to streptococcal pharyngitis, mononucleosis, viral URI syndrome, lymphadenitis.  Will order labs and reassess.    Negative rapid strep screen.  Cultures pending.  Negative heterophile.  CBC no leukocytosis.  H/H within normal limits for patient.  CMP with no electrolyte abnormalities.  Creatinine at 9.4; patient on HD at home and plans to do dialysis tonight.    Given patient's workup, her symptoms today are most likely due to lymphadenitis.  She does not need any further labs or imaging at this time.  I will prescribe amoxicillin.  She is to follow-up with PCP and ENT.  All questions answered.  Patient and family is comfortable with plan, and patient is stable for discharge.  I reviewed patient's chart and labs and discussed this case with my supervising  ALEJANDRO Thorne Attestation:   Scribe #1: I performed the above scribed service and the documentation accurately describes the services I performed. I attest to the accuracy of the note.    Attending Attestation:     Physician Attestation Statement for NP/PA:   I have conducted a face to face encounter with this patient in addition to the NP/PA, due to Medical Complexity    Other NP/PA Attestation Additions:    History of Present Illness: Patient with swelling to the throat.   Physical Exam: On exam, has bilateral enlarged cervical lymph nodes. No erythema or tenderness. Airway and oropharynx clear.    Medical Decision Making: Patient with liver transplant on dialysis with swelling to neck. Appears non-toxic. Will check basic labs, rapid strep. Patient also with elevated blood pressure. Will recheck and give more clonidine if needed.        Physician Attestation for Scribe:  Physician Attestation Statement for Scribe #1: I, Dr. Byrne, reviewed documentation, as scribed by Adriane Bruce in my presence, and it is both accurate and complete.                 ED Course     Clinical Impression:   The encounter diagnosis was Lymphadenitis.    Disposition:   Disposition: Discharged  Condition: Stable                        Arielle Grover PA-C  07/14/17 4993

## 2017-07-14 NOTE — ED NOTES
Patient identifiers verified and correct for  MS Patel  C/C: Facial swelling  APPEARANCE: awake and alert in NAD.  SKIN: warm, dry and intact. No breakdown or bruising.  MUSCULOSKELETAL: Patient moving all extremities spontaneously, no obvious swelling or deformities noted. Ambulates independently.  RESPIRATORY:Denies shortness of breath.Respirations unlabored. All breath sounds clear, denies throat swelling or sore throat  CARDIAC: heart tones normal. Regular rate and rhythm; 2+ distal pulses; no peripheral edema  ABDOMEN: S/ND/NT, Denies nausea, normoactive bowel sounds present in all four quadrants. Normal stool pattern.  : Small amounts urine  Neurologic: AAO x 4; follows commands equal strength in all extremities; denies numbness/tingling. PERRLA

## 2017-07-14 NOTE — DISCHARGE INSTRUCTIONS
Take oral antibiotics 2 times a day for the next 7 days.  Follow-up with ear, nose, and throat ENT doctor.  Follow-up with your primary care physician.  See them in 3 days or as soon as possible.  Continue all home medication as prescribed.    Future Appointments  Date Time Provider Department [place]   7/24/2017 8:30 AM LAB, [facility]   7/26/2017 1:20 PM Viktor Bass MD [facility]   8/30/2017 1:00 PM [facility]       Our goal in the emergency department is to always give you outstanding care and exceptional service. You may receive a survey by mail or e-mail in the next week regarding your experience in our ED. We would greatly appreciate your completing and returning the survey. Your feedback provides us with a way to recognize our staff who give very good care and it helps us learn how to improve when your experience was below our aspiration of excellence.

## 2017-07-16 LAB — BACTERIA THROAT CULT: NORMAL

## 2017-07-24 ENCOUNTER — LAB VISIT (OUTPATIENT)
Dept: LAB | Facility: HOSPITAL | Age: 27
End: 2017-07-24
Attending: INTERNAL MEDICINE
Payer: MEDICARE

## 2017-07-24 ENCOUNTER — TELEPHONE (OUTPATIENT)
Dept: TRANSPLANT | Facility: CLINIC | Age: 27
End: 2017-07-24

## 2017-07-24 DIAGNOSIS — Z94.4 LIVER TRANSPLANTED: ICD-10-CM

## 2017-07-24 LAB
ALBUMIN SERPL BCP-MCNC: 2.9 G/DL
ALP SERPL-CCNC: 596 U/L
ALT SERPL W/O P-5'-P-CCNC: 43 U/L
ANION GAP SERPL CALC-SCNC: 9 MMOL/L
AST SERPL-CCNC: 44 U/L
BASOPHILS # BLD AUTO: 0.01 K/UL
BASOPHILS NFR BLD: 0.3 %
BILIRUB SERPL-MCNC: 0.6 MG/DL
BUN SERPL-MCNC: 55 MG/DL
CALCIUM SERPL-MCNC: 9 MG/DL
CHLORIDE SERPL-SCNC: 106 MMOL/L
CO2 SERPL-SCNC: 22 MMOL/L
CREAT SERPL-MCNC: 9.2 MG/DL
DIFFERENTIAL METHOD: ABNORMAL
EOSINOPHIL # BLD AUTO: 0.3 K/UL
EOSINOPHIL NFR BLD: 9.1 %
ERYTHROCYTE [DISTWIDTH] IN BLOOD BY AUTOMATED COUNT: 15.3 %
EST. GFR  (AFRICAN AMERICAN): 6.1 ML/MIN/1.73 M^2
EST. GFR  (NON AFRICAN AMERICAN): 5.3 ML/MIN/1.73 M^2
GLUCOSE SERPL-MCNC: 92 MG/DL
HCT VFR BLD AUTO: 27.7 %
HGB BLD-MCNC: 9.2 G/DL
LYMPHOCYTES # BLD AUTO: 0.7 K/UL
LYMPHOCYTES NFR BLD: 21.8 %
MCH RBC QN AUTO: 26.8 PG
MCHC RBC AUTO-ENTMCNC: 33.2 G/DL
MCV RBC AUTO: 81 FL
MONOCYTES # BLD AUTO: 0.2 K/UL
MONOCYTES NFR BLD: 5.5 %
NEUTROPHILS # BLD AUTO: 1.9 K/UL
NEUTROPHILS NFR BLD: 63.3 %
PLATELET # BLD AUTO: 91 K/UL
PMV BLD AUTO: 10 FL
POTASSIUM SERPL-SCNC: 4.1 MMOL/L
PROT SERPL-MCNC: 7 G/DL
RBC # BLD AUTO: 3.43 M/UL
SODIUM SERPL-SCNC: 137 MMOL/L
WBC # BLD AUTO: 3.08 K/UL

## 2017-07-24 PROCEDURE — 80053 COMPREHEN METABOLIC PANEL: CPT

## 2017-07-24 PROCEDURE — 80197 ASSAY OF TACROLIMUS: CPT

## 2017-07-24 PROCEDURE — 85025 COMPLETE CBC W/AUTO DIFF WBC: CPT

## 2017-07-24 PROCEDURE — 36415 COLL VENOUS BLD VENIPUNCTURE: CPT | Mod: PO

## 2017-07-25 ENCOUNTER — TELEPHONE (OUTPATIENT)
Dept: TRANSPLANT | Facility: CLINIC | Age: 27
End: 2017-07-25

## 2017-07-25 LAB — TACROLIMUS BLD-MCNC: 6.2 NG/ML

## 2017-07-26 ENCOUNTER — OFFICE VISIT (OUTPATIENT)
Dept: NEPHROLOGY | Facility: CLINIC | Age: 27
End: 2017-07-26
Payer: MEDICARE

## 2017-07-26 ENCOUNTER — TELEPHONE (OUTPATIENT)
Dept: TRANSPLANT | Facility: CLINIC | Age: 27
End: 2017-07-26

## 2017-07-26 VITALS
WEIGHT: 130.75 LBS | SYSTOLIC BLOOD PRESSURE: 142 MMHG | HEIGHT: 65 IN | DIASTOLIC BLOOD PRESSURE: 100 MMHG | BODY MASS INDEX: 21.79 KG/M2 | HEART RATE: 97 BPM | OXYGEN SATURATION: 98 %

## 2017-07-26 DIAGNOSIS — N18.6 ESRD (END STAGE RENAL DISEASE): Primary | ICD-10-CM

## 2017-07-26 DIAGNOSIS — Z94.4 LIVER REPLACED BY TRANSPLANT: ICD-10-CM

## 2017-07-26 DIAGNOSIS — N18.6 HYPERTENSIVE KIDNEY DISEASE WITH END-STAGE RENAL DISEASE: ICD-10-CM

## 2017-07-26 DIAGNOSIS — N25.81 HYPERPARATHYROIDISM, SECONDARY RENAL: ICD-10-CM

## 2017-07-26 DIAGNOSIS — D63.1 ANEMIA OF CHRONIC RENAL FAILURE, STAGE 5: ICD-10-CM

## 2017-07-26 DIAGNOSIS — N18.5 ANEMIA OF CHRONIC RENAL FAILURE, STAGE 5: ICD-10-CM

## 2017-07-26 DIAGNOSIS — I12.0 HYPERTENSIVE KIDNEY DISEASE WITH END-STAGE RENAL DISEASE: ICD-10-CM

## 2017-07-26 PROCEDURE — 99999 PR PBB SHADOW E&M-EST. PATIENT-LVL II: CPT | Mod: PBBFAC,,, | Performed by: INTERNAL MEDICINE

## 2017-07-26 PROCEDURE — 99499 UNLISTED E&M SERVICE: CPT | Mod: S$PBB,,, | Performed by: INTERNAL MEDICINE

## 2017-07-26 PROCEDURE — 99212 OFFICE O/P EST SF 10 MIN: CPT | Mod: PBBFAC,PO | Performed by: INTERNAL MEDICINE

## 2017-07-26 NOTE — TELEPHONE ENCOUNTER
Spoke with pt, reviewed stable labs and prograf level.  Pt states she has found strategies for making sure she takes her medications at the same time everyday so she dose not miss any doses.  Pt agreed to repeat labs 8/7/17.

## 2017-08-01 RX ORDER — CINACALCET 60 MG/1
60 TABLET, FILM COATED ORAL
Qty: 30 TABLET | Refills: 11
Start: 2017-08-01 | End: 2017-12-27 | Stop reason: SDUPTHER

## 2017-08-01 NOTE — PROGRESS NOTES
Subjective:       Patient ID: Holly Patel is a 26 y.o. Black or  female who presents for follow-up evaluation of ESRD    HPI    Ms. 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  Patient initiated on hemodialysis October 2015, trained on home hemodialysis February 2016, now performing at home without difficulty (with main assistance from her sister).  Patient reports blood pressures have been better controlled.  She otherwise denies any fever, chest pain, shortness of breath, abdominal pain, diarrhea, dysuria/hematuria.    Review of Systems   Constitutional: Negative for appetite change, fatigue and fever.   Respiratory: Negative for chest tightness and shortness of breath.    Cardiovascular: Negative for chest pain and leg swelling.   Gastrointestinal: Negative for abdominal pain, constipation, diarrhea, nausea and vomiting.   Genitourinary: Negative for difficulty urinating, dysuria, flank pain, frequency, hematuria and urgency.   Musculoskeletal: Negative for arthralgias, joint swelling and myalgias.   Skin: Negative for rash and wound.   Neurological: Negative for dizziness, weakness and light-headedness.   All other systems reviewed and are negative.      Objective:      Physical Exam   Constitutional: She appears well-developed and well-nourished.   Cardiovascular: Normal rate, regular rhythm and normal heart sounds.  Exam reveals no gallop and no friction rub.    No murmur heard.  Pulmonary/Chest: Effort normal and breath sounds normal. No respiratory distress. She has no wheezes. She has no rales.   Abdominal: Soft. Bowel sounds are normal. There is no tenderness.   Musculoskeletal: She exhibits no edema.   Neurological: She is alert.   Skin: Skin is warm and dry. No rash noted. No erythema.   Psychiatric: She has a normal mood and affect.    Vitals reviewed.    Access: L AVF w/ good thrill/bruit    Non-OCF labs July  eKt/V 1.7  H/H 9.1/27.7  Ca/Phos 9.2/5.0  PTH 3562  Alb 3.3    Assessment:       1. ESRD (end stage renal disease)    2. Hypertensive kidney disease with end-stage renal disease    3. Liver replaced by transplant    4. Hyperparathyroidism, secondary renal    5. Anemia of chronic renal failure, stage 5        Plan:     Ms. 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  Patient initiated on hemodialysis October 2015, trained on home hemodialysis February 2016, now performing at home without difficulty (with main assistance from her sister).  Kt/V previously above goal, reduced treatments to 4x/wk, now near goal, will continue to monitor clearance.  Patient followed by Transplant for possible kidney transplantation.  - Hypertension: BP improved though still elevated, patient previously unable to afford carvedilol, previously increased labetolol  - Anemia: Hgb near goal, continue erythropoetin therapy  - Bone/mineral metabolism: patient with secondary hyperparathyroidism, discussed low phosphorous diet; previously increased calcitriol, will increase cinacalcet (had not received previously due to insurance/pharmacy)    Return to clinic monthly

## 2017-08-05 ENCOUNTER — TELEPHONE (OUTPATIENT)
Dept: HEPATOLOGY | Facility: CLINIC | Age: 27
End: 2017-08-05

## 2017-08-05 ENCOUNTER — HOSPITAL ENCOUNTER (INPATIENT)
Facility: HOSPITAL | Age: 27
LOS: 1 days | Discharge: HOME | DRG: 304 | End: 2017-08-05
Attending: EMERGENCY MEDICINE | Admitting: EMERGENCY MEDICINE
Payer: MEDICARE

## 2017-08-05 VITALS
RESPIRATION RATE: 17 BRPM | HEART RATE: 60 BPM | DIASTOLIC BLOOD PRESSURE: 72 MMHG | OXYGEN SATURATION: 98 % | WEIGHT: 130 LBS | TEMPERATURE: 99 F | SYSTOLIC BLOOD PRESSURE: 129 MMHG | BODY MASS INDEX: 21.66 KG/M2 | HEIGHT: 65 IN

## 2017-08-05 DIAGNOSIS — D84.9 IMMUNOSUPPRESSION: ICD-10-CM

## 2017-08-05 DIAGNOSIS — N18.6 ESRD (END STAGE RENAL DISEASE): ICD-10-CM

## 2017-08-05 DIAGNOSIS — I16.0 HYPERTENSIVE URGENCY, MALIGNANT: ICD-10-CM

## 2017-08-05 DIAGNOSIS — R50.9 FEBRILE ILLNESS: Primary | ICD-10-CM

## 2017-08-05 DIAGNOSIS — N25.81 SECONDARY HYPERPARATHYROIDISM: ICD-10-CM

## 2017-08-05 DIAGNOSIS — N39.0 URINARY TRACT INFECTION WITHOUT HEMATURIA, SITE UNSPECIFIED: ICD-10-CM

## 2017-08-05 DIAGNOSIS — Z94.4 LIVER REPLACED BY TRANSPLANT: ICD-10-CM

## 2017-08-05 PROBLEM — R68.83 CHILLS: Status: ACTIVE | Noted: 2017-08-05

## 2017-08-05 PROBLEM — R68.83 CHILLS: Status: RESOLVED | Noted: 2017-08-05 | Resolved: 2017-08-05

## 2017-08-05 LAB
ALBUMIN SERPL BCP-MCNC: 3.1 G/DL
ALP SERPL-CCNC: 639 U/L
ALT SERPL W/O P-5'-P-CCNC: 45 U/L
ANION GAP SERPL CALC-SCNC: 12 MMOL/L
ANISOCYTOSIS BLD QL SMEAR: ABNORMAL
AST SERPL-CCNC: 52 U/L
B-HCG UR QL: NEGATIVE
BACTERIA #/AREA URNS AUTO: ABNORMAL /HPF
BASOPHILS # BLD AUTO: 0.01 K/UL
BASOPHILS NFR BLD: 0.2 %
BILIRUB SERPL-MCNC: 0.8 MG/DL
BILIRUB UR QL STRIP: NEGATIVE
BUN SERPL-MCNC: 57 MG/DL
CALCIUM SERPL-MCNC: 9.3 MG/DL
CHLORIDE SERPL-SCNC: 106 MMOL/L
CLARITY UR REFRACT.AUTO: ABNORMAL
CO2 SERPL-SCNC: 17 MMOL/L
COLOR UR AUTO: YELLOW
CREAT SERPL-MCNC: 10.3 MG/DL
CTP QC/QA: YES
DACRYOCYTES BLD QL SMEAR: ABNORMAL
DIFFERENTIAL METHOD: ABNORMAL
EOSINOPHIL # BLD AUTO: 0.2 K/UL
EOSINOPHIL NFR BLD: 3.8 %
ERYTHROCYTE [DISTWIDTH] IN BLOOD BY AUTOMATED COUNT: 14.9 %
EST. GFR  (AFRICAN AMERICAN): 5.4 ML/MIN/1.73 M^2
EST. GFR  (NON AFRICAN AMERICAN): 4.6 ML/MIN/1.73 M^2
FLUAV AG SPEC QL IA: NEGATIVE
FLUBV AG SPEC QL IA: NEGATIVE
GLUCOSE SERPL-MCNC: 100 MG/DL
GLUCOSE UR QL STRIP: ABNORMAL
HCT VFR BLD AUTO: 29.1 %
HGB BLD-MCNC: 9.5 G/DL
HGB UR QL STRIP: ABNORMAL
HYALINE CASTS UR QL AUTO: 0 /LPF
HYPOCHROMIA BLD QL SMEAR: ABNORMAL
INR PPP: 1
KETONES UR QL STRIP: NEGATIVE
LACTATE SERPL-SCNC: 0.6 MMOL/L
LEUKOCYTE ESTERASE UR QL STRIP: ABNORMAL
LYMPHOCYTES # BLD AUTO: 0.4 K/UL
LYMPHOCYTES NFR BLD: 7.3 %
MCH RBC QN AUTO: 26.9 PG
MCHC RBC AUTO-ENTMCNC: 32.6 G/DL
MCV RBC AUTO: 82 FL
MICROSCOPIC COMMENT: ABNORMAL
MONOCYTES # BLD AUTO: 0.3 K/UL
MONOCYTES NFR BLD: 4.5 %
NEUTROPHILS # BLD AUTO: 4.6 K/UL
NEUTROPHILS NFR BLD: 84.2 %
NITRITE UR QL STRIP: NEGATIVE
OVALOCYTES BLD QL SMEAR: ABNORMAL
PH UR STRIP: 7 [PH] (ref 5–8)
PLATELET # BLD AUTO: 65 K/UL
PLATELET BLD QL SMEAR: ABNORMAL
PMV BLD AUTO: ABNORMAL FL
POIKILOCYTOSIS BLD QL SMEAR: SLIGHT
POLYCHROMASIA BLD QL SMEAR: ABNORMAL
POTASSIUM SERPL-SCNC: 4.8 MMOL/L
PROCALCITONIN SERPL IA-MCNC: 0.97 NG/ML
PROT SERPL-MCNC: 7.3 G/DL
PROT UR QL STRIP: ABNORMAL
PROTHROMBIN TIME: 10.7 SEC
RBC # BLD AUTO: 3.53 M/UL
RBC #/AREA URNS AUTO: 37 /HPF (ref 0–4)
SODIUM SERPL-SCNC: 135 MMOL/L
SP GR UR STRIP: 1.01 (ref 1–1.03)
SPECIMEN SOURCE: NORMAL
SQUAMOUS #/AREA URNS AUTO: 7 /HPF
TACROLIMUS BLD-MCNC: 4.4 NG/ML
URN SPEC COLLECT METH UR: ABNORMAL
UROBILINOGEN UR STRIP-ACNC: NEGATIVE EU/DL
WBC # BLD AUTO: 5.51 K/UL
WBC #/AREA URNS AUTO: 6 /HPF (ref 0–5)

## 2017-08-05 PROCEDURE — 36415 COLL VENOUS BLD VENIPUNCTURE: CPT

## 2017-08-05 PROCEDURE — 85610 PROTHROMBIN TIME: CPT

## 2017-08-05 PROCEDURE — 84145 PROCALCITONIN (PCT): CPT

## 2017-08-05 PROCEDURE — 25000003 PHARM REV CODE 250: Performed by: INTERNAL MEDICINE

## 2017-08-05 PROCEDURE — 83605 ASSAY OF LACTIC ACID: CPT

## 2017-08-05 PROCEDURE — 96365 THER/PROPH/DIAG IV INF INIT: CPT

## 2017-08-05 PROCEDURE — 85025 COMPLETE CBC W/AUTO DIFF WBC: CPT

## 2017-08-05 PROCEDURE — 80053 COMPREHEN METABOLIC PANEL: CPT

## 2017-08-05 PROCEDURE — 81001 URINALYSIS AUTO W/SCOPE: CPT

## 2017-08-05 PROCEDURE — 99222 1ST HOSP IP/OBS MODERATE 55: CPT | Mod: AI,GC,, | Performed by: INTERNAL MEDICINE

## 2017-08-05 PROCEDURE — 20600001 HC STEP DOWN PRIVATE ROOM

## 2017-08-05 PROCEDURE — 80197 ASSAY OF TACROLIMUS: CPT

## 2017-08-05 PROCEDURE — 81025 URINE PREGNANCY TEST: CPT | Performed by: EMERGENCY MEDICINE

## 2017-08-05 PROCEDURE — 87186 SC STD MICRODIL/AGAR DIL: CPT | Mod: 59

## 2017-08-05 PROCEDURE — 25000003 PHARM REV CODE 250: Performed by: STUDENT IN AN ORGANIZED HEALTH CARE EDUCATION/TRAINING PROGRAM

## 2017-08-05 PROCEDURE — 99285 EMERGENCY DEPT VISIT HI MDM: CPT | Mod: ,,, | Performed by: EMERGENCY MEDICINE

## 2017-08-05 PROCEDURE — 87077 CULTURE AEROBIC IDENTIFY: CPT | Mod: 59

## 2017-08-05 PROCEDURE — 96375 TX/PRO/DX INJ NEW DRUG ADDON: CPT

## 2017-08-05 PROCEDURE — 99222 1ST HOSP IP/OBS MODERATE 55: CPT | Mod: GC,,, | Performed by: INTERNAL MEDICINE

## 2017-08-05 PROCEDURE — A4216 STERILE WATER/SALINE, 10 ML: HCPCS | Performed by: STUDENT IN AN ORGANIZED HEALTH CARE EDUCATION/TRAINING PROGRAM

## 2017-08-05 PROCEDURE — 63600175 PHARM REV CODE 636 W HCPCS: Performed by: EMERGENCY MEDICINE

## 2017-08-05 PROCEDURE — 99285 EMERGENCY DEPT VISIT HI MDM: CPT | Mod: 25

## 2017-08-05 PROCEDURE — 87040 BLOOD CULTURE FOR BACTERIA: CPT

## 2017-08-05 PROCEDURE — 87400 INFLUENZA A/B EACH AG IA: CPT | Mod: 59

## 2017-08-05 PROCEDURE — 63600175 PHARM REV CODE 636 W HCPCS: Performed by: STUDENT IN AN ORGANIZED HEALTH CARE EDUCATION/TRAINING PROGRAM

## 2017-08-05 RX ORDER — HYDRALAZINE HYDROCHLORIDE 25 MG/1
25 TABLET, FILM COATED ORAL EVERY 8 HOURS
Status: DISCONTINUED | OUTPATIENT
Start: 2017-08-05 | End: 2017-08-05

## 2017-08-05 RX ORDER — LABETALOL 200 MG/1
200 TABLET, FILM COATED ORAL 2 TIMES DAILY
Status: DISCONTINUED | OUTPATIENT
Start: 2017-08-05 | End: 2017-08-05

## 2017-08-05 RX ORDER — CINACALCET 60 MG/1
60 TABLET, FILM COATED ORAL
Status: DISCONTINUED | OUTPATIENT
Start: 2017-08-05 | End: 2017-08-05 | Stop reason: HOSPADM

## 2017-08-05 RX ORDER — LABETALOL 200 MG/1
200 TABLET, FILM COATED ORAL 2 TIMES DAILY
Status: DISCONTINUED | OUTPATIENT
Start: 2017-08-05 | End: 2017-08-05 | Stop reason: HOSPADM

## 2017-08-05 RX ORDER — AMLODIPINE BESYLATE 10 MG/1
10 TABLET ORAL ONCE
Status: COMPLETED | OUTPATIENT
Start: 2017-08-05 | End: 2017-08-05

## 2017-08-05 RX ORDER — ONDANSETRON 2 MG/ML
4 INJECTION INTRAMUSCULAR; INTRAVENOUS
Status: COMPLETED | OUTPATIENT
Start: 2017-08-05 | End: 2017-08-05

## 2017-08-05 RX ORDER — AMOXICILLIN 250 MG
1 CAPSULE ORAL DAILY PRN
Status: DISCONTINUED | OUTPATIENT
Start: 2017-08-05 | End: 2017-08-05 | Stop reason: HOSPADM

## 2017-08-05 RX ORDER — ENOXAPARIN SODIUM 100 MG/ML
40 INJECTION SUBCUTANEOUS EVERY 24 HOURS
Status: DISCONTINUED | OUTPATIENT
Start: 2017-08-05 | End: 2017-08-05

## 2017-08-05 RX ORDER — HYDRALAZINE HYDROCHLORIDE 50 MG/1
50 TABLET, FILM COATED ORAL EVERY 8 HOURS
Status: DISCONTINUED | OUTPATIENT
Start: 2017-08-05 | End: 2017-08-05 | Stop reason: HOSPADM

## 2017-08-05 RX ORDER — SODIUM CHLORIDE 0.9 % (FLUSH) 0.9 %
3 SYRINGE (ML) INJECTION EVERY 8 HOURS
Status: DISCONTINUED | OUTPATIENT
Start: 2017-08-05 | End: 2017-08-05 | Stop reason: HOSPADM

## 2017-08-05 RX ORDER — PREDNISONE 1 MG/1
1 TABLET ORAL EVERY OTHER DAY
Status: DISCONTINUED | OUTPATIENT
Start: 2017-08-05 | End: 2017-08-05 | Stop reason: HOSPADM

## 2017-08-05 RX ORDER — CLONIDINE HYDROCHLORIDE 0.1 MG/1
0.1 TABLET ORAL 2 TIMES DAILY
Status: DISCONTINUED | OUTPATIENT
Start: 2017-08-05 | End: 2017-08-05 | Stop reason: HOSPADM

## 2017-08-05 RX ORDER — ONDANSETRON 8 MG/1
8 TABLET, ORALLY DISINTEGRATING ORAL EVERY 8 HOURS PRN
Status: DISCONTINUED | OUTPATIENT
Start: 2017-08-05 | End: 2017-08-05 | Stop reason: HOSPADM

## 2017-08-05 RX ORDER — CLONIDINE HYDROCHLORIDE 0.1 MG/1
0.1 TABLET ORAL EVERY 6 HOURS PRN
Status: DISCONTINUED | OUTPATIENT
Start: 2017-08-05 | End: 2017-08-05 | Stop reason: HOSPADM

## 2017-08-05 RX ORDER — CLONIDINE HYDROCHLORIDE 0.1 MG/1
0.1 TABLET ORAL 2 TIMES DAILY
Status: DISCONTINUED | OUTPATIENT
Start: 2017-08-05 | End: 2017-08-05

## 2017-08-05 RX ORDER — AMLODIPINE BESYLATE 10 MG/1
10 TABLET ORAL DAILY
Status: DISCONTINUED | OUTPATIENT
Start: 2017-08-05 | End: 2017-08-05 | Stop reason: HOSPADM

## 2017-08-05 RX ORDER — HEPARIN SODIUM 5000 [USP'U]/ML
5000 INJECTION, SOLUTION INTRAVENOUS; SUBCUTANEOUS EVERY 12 HOURS
Status: DISCONTINUED | OUTPATIENT
Start: 2017-08-05 | End: 2017-08-05 | Stop reason: HOSPADM

## 2017-08-05 RX ADMIN — CEFTRIAXONE 1 G: 1 INJECTION, SOLUTION INTRAVENOUS at 03:08

## 2017-08-05 RX ADMIN — HEPARIN SODIUM 5000 UNITS: 5000 INJECTION, SOLUTION INTRAVENOUS; SUBCUTANEOUS at 09:08

## 2017-08-05 RX ADMIN — SODIUM CHLORIDE, PRESERVATIVE FREE 3 ML: 5 INJECTION INTRAVENOUS at 05:08

## 2017-08-05 RX ADMIN — AMLODIPINE BESYLATE 10 MG: 10 TABLET ORAL at 09:08

## 2017-08-05 RX ADMIN — SODIUM CHLORIDE, PRESERVATIVE FREE 3 ML: 5 INJECTION INTRAVENOUS at 03:08

## 2017-08-05 RX ADMIN — HYDRALAZINE HYDROCHLORIDE 50 MG: 50 TABLET ORAL at 03:08

## 2017-08-05 RX ADMIN — AMLODIPINE BESYLATE 10 MG: 10 TABLET ORAL at 06:08

## 2017-08-05 RX ADMIN — CLONIDINE HYDROCHLORIDE 0.1 MG: 0.1 TABLET ORAL at 09:08

## 2017-08-05 RX ADMIN — ONDANSETRON 4 MG: 2 INJECTION INTRAMUSCULAR; INTRAVENOUS at 02:08

## 2017-08-05 RX ADMIN — LABETALOL HYDROCHLORIDE 200 MG: 200 TABLET, FILM COATED ORAL at 09:08

## 2017-08-05 NOTE — CONSULTS
[facility]  Nephrology  Consult Note    Patient Name: Holly Patel  MRN: [id number]  Admission Date: 2017  Hospital Length of Stay: 0 days  Attending Provider: Brooklynn Kellogg MD   Primary Care Physician: [facility]  Principal Problem:Febrile illness    Inpatient consult to Nephrology  Consult performed by: KASHMIR HARRIS  Consult ordered by: DINORAH BLANCA  Reason for consult: ESRD on HD        Subjective:     HPI: Ms. Patel is a 25 yo female with HTN, ESRD on home HD, and h/o liver transplant (, on long term immune suppression) who presented for fever and malaise x 1 day.  Patient denied any sick contacts, congestion, sorethroat, cough, N/V, or headache.  She was last dialyzed 2 days ago and normally received dialysis Sun, M, T, , Sat. Patient takes prograf and prednisone on a regular basis and has been compliant.     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    Renal disorder        Past Surgical History:   Procedure Laterality Date     SECTION      2     LIVER BIOPSY      LIVER TRANSPLANT  1992    TUBAL LIGATION         Review of patient's allergies indicates:   Allergen Reactions    Chloral hydrate      Other reaction(s): Hallucinations  Other reaction(s): Hives    Hydrocodone      Current Facility-Administered Medications   Medication Frequency    amlodipine tablet 10 mg Daily    cinacalcet tablet 60 mg Daily with breakfast    cloNIDine tablet 0.1 mg BID    cloNIDine tablet 0.1 mg Q6H PRN    heparin (porcine) injection 5,000 Units Q12H    hydrALAZINE tablet 50 mg Q8H    labetalol tablet 200 mg BID    ondansetron disintegrating tablet 8 mg Q8H PRN     predniSONE tablet 1 mg Every other day    senna-docusate 8.6-50 mg per tablet 1 tablet Daily PRN    sodium chloride 0.9% flush 3 mL Q8H    tacrolimus capsule 8 mg BID     Family History     Problem Relation (Age of Onset)    Hypertension Mother, Father        Social History Main Topics    Smoking status: Never Smoker    Smokeless tobacco: Never Used    Alcohol use No    Drug use: No    Sexual activity: Yes     Partners: Male     Review of Systems   Constitutional: Positive for chills and fever. Negative for appetite change, fatigue and unexpected weight change.        Generalized malaise   HENT: Negative for congestion, ear pain, sore throat and trouble swallowing.    Eyes: Negative for visual disturbance.   Respiratory: Negative for cough and shortness of breath.    Cardiovascular: Negative for chest pain and leg swelling.   Gastrointestinal: Negative for abdominal distention, abdominal pain, constipation, diarrhea, nausea and vomiting.   Genitourinary: Negative for difficulty urinating, dysuria and hematuria.   Musculoskeletal: Negative for myalgias.   Skin: Negative for rash.   Neurological: Negative for dizziness, weakness, light-headedness and headaches.     Objective:     Vital Signs (Most Recent):  Temp: 98.9 °F (37.2 °C) (08/05/17 1200)  Pulse: 60 (08/05/17 1200)  Resp: 17 (08/05/17 0500)  BP: 129/72 (08/05/17 1528)  SpO2: 98 % (08/05/17 1200)  O2 Device (Oxygen Therapy): room air (08/05/17 0500) Vital Signs (24h Range):  Temp:  [98.3 °F (36.8 °C)-99.8 °F (37.7 °C)] 98.9 °F (37.2 °C)  Pulse:  [60-99] 60  Resp:  [16-18] 17  SpO2:  [98 %-100 %] 98 %  BP: (120-202)/() 129/72     Weight: 59 kg (130 lb) (08/05/17 0126)  Body mass index is 21.63 kg/m².  Body surface area is 1.64 meters squared.    No intake/output data recorded.    Physical Exam  Constitutional: She is oriented to person, place, and time. She appears well-developed and well-nourished.   HENT:   Head: Normocephalic and atraumatic.    Eyes: Pupils are equal, round, and reactive to light.   Neck: No JVD present.   Cardiovascular: Normal rate, regular rhythm and intact distal pulses.    No murmur heard.  Pulmonary/Chest: Effort normal and breath sounds normal. No respiratory distress.   Abdominal: Soft. Bowel sounds are normal. She exhibits no distension. There is no tenderness.   Musculoskeletal: She exhibits no edema.   AV fistula to left forearm - has good thrill. No erythema, inflammation, TTP around site   Neurological: She is alert and oriented to person, place, and time.   Skin: Skin is warm and dry. Capillary refill takes less than 2 seconds.   Psychiatric: She has a normal mood and affect    Significant Labs:  CBC:   Recent Labs  Lab 08/05/17  0203   WBC 5.51   RBC 3.53*   HGB 9.5*   HCT 29.1*   PLT 65*   MCV 82   MCH 26.9*   MCHC 32.6     CMP:   Recent Labs  Lab 08/05/17  0203      CALCIUM 9.3   ALBUMIN 3.1*   PROT 7.3   *   K 4.8   CO2 17*      BUN 57*   CREATININE 10.3*   ALKPHOS 639*   ALT 45*   AST 52*   BILITOT 0.8     All labs within the past 24 hours have been reviewed.        Assessment/Plan:     ESRD (end stage renal disease)    - Patient on home dialysis that she received every Sun, M, T, TH, Sat  - Would do HD today if patient staying in hospital, however patient is being discharged today and will complete her dialysis at home this afternoon.  - No need for emergent HD            Thank you for your consult. I will sign off. Please contact us if you have any additional questions.    Annette Dupont MD  Nephrology  [facility]    ATTENDING PHYSICIAN ATTESTATION  I have personally interviewed and examined the patient. I thoroughly reviewed the demographic, clinical, laboratorial and imaging information available in medical records. I agree with the assessment and recommendations provided by the subspecialty resident. Dr. Dupont was under my supervision.

## 2017-08-05 NOTE — HOSPITAL COURSE
-- afebrile since admission, with no localizing symptom, UA with 6 WBC, +1 leukocyte but with no bacteria, negative nitrite. In the settings of proteinuria in ESRD. And no  symptoms. LA 0.06 and procalc 0.97 and negative flu swaps. Likely febrile illness.   -- during her stay she had HTN urgency, with -180, restart home meds and added PRN clonidine last .         Review of Systems   Constitutional:  Negative for activity change, appetite change, diaphoresis and unexpected weight change.   HENT: Negative for congestion, rhinorrhea, sinus pressure, sneezing and sore throat.    Respiratory: Negative for cough, chest tightness and shortness of breath.    Cardiovascular: Negative for chest pain, palpitations and leg swelling.   Gastrointestinal: Negative for abdominal distention, abdominal pain, constipation, diarrhea, nausea and vomiting.   Endocrine: Negative for polyuria.   Genitourinary: Negative for difficulty urinating, frequency, hematuria and urgency.   Musculoskeletal: Negative for arthralgias and myalgias.   Skin: Negative for pallor.   Neurological: Negative for weakness, light-headedness and headaches.      Objective:      Vital Sign Min/Max (last 24 hours)     Value Min Max   Temp 98.3 °F (36.8 °C) 99.8 °F (37.7 °C)   Pulse 60 99   Resp 16 18   BP: Systolic 120 202   BP: Diastolic 56 109   SpO2 98 % 100 %       Weight: 59 kg (130 lb)  Body mass index is 21.63 kg/m².     Physical Exam   Constitutional: She is oriented to person, place, and time. She appears well-developed and well-nourished.   HENT:   Head: Normocephalic and atraumatic.   Eyes: Pupils are equal, round, and reactive to light.   Neck: No JVD present.   Cardiovascular: Normal rate, regular rhythm and intact distal pulses.    No murmur heard.  Pulmonary/Chest: Effort normal and breath sounds normal. No respiratory distress.   Abdominal: Soft. Bowel sounds are normal. She exhibits no distension. There is no tenderness.    Musculoskeletal: She exhibits no edema.   AV fistula on left arm - has good thrill. No erythema, inflammation, TTP around site   Neurological: She is alert and oriented to person, place, and time.   Skin: Skin is warm and dry. Capillary refill takes less than 2 seconds.   Psychiatric: She has a normal mood and affect. Her behavior is normal. Judgment and thought content normal.   Vitals reviewed.

## 2017-08-05 NOTE — NURSING
Pt. Verbalized understanding of discharge instructions, IV discontinued, tip intact, gauze and tape applied.  Pt. transported to vehicle via wheelchair in stable condition.

## 2017-08-05 NOTE — ASSESSMENT & PLAN NOTE
- pt does not have sx or end-organ damage  - continue home BP meds amlodipine, clonidine and labetalol. Times adjusted for meds to be given early.

## 2017-08-05 NOTE — HPI
26 y.o. F with HTN, hemangioendothelioma s/p LTx (1992), ESRD on home hemodialysis (still makes urine), and , secondary PTH who presents with 1 day h/o fever. Pt reports that yesterday evening (8pm) she felt feverish, took her temp (100.6) and went to nap. When she woke up from her nap, she felt worse and had chills. She informed her mother of these symptoms and was brought to the ER.   She has not had similar symptoms, no prior h/o UTI. She denies any n/v/d, cough, sinus congestion, change in appetite, dysuria, urgency in last few or any sick contacts. Has not gone out of town.

## 2017-08-05 NOTE — ASSESSMENT & PLAN NOTE
- pt on prograf & prednisone at home  - continuing prednisone while in hospital  - prograf held in light of infection; prograf level in process  - may consider Hepatology consult for assistance with prograf mgmt

## 2017-08-05 NOTE — ASSESSMENT & PLAN NOTE
- Patient on home dialysis that she received every Sun, M, T, TH, Sat  - Would do HD today if patient staying in hospital, however patient is being discharged today and will complete her dialysis at home this afternoon.  - No need for emergent HD

## 2017-08-05 NOTE — SUBJECTIVE & OBJECTIVE
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    Renal disorder        Past Surgical History:   Procedure Laterality Date     SECTION      2     LIVER BIOPSY      LIVER TRANSPLANT  1992    TUBAL LIGATION         Review of patient's allergies indicates:   Allergen Reactions    Chloral hydrate      Other reaction(s): Hallucinations  Other reaction(s): Hives    Hydrocodone      Current Facility-Administered Medications   Medication Frequency    amlodipine tablet 10 mg Daily    cinacalcet tablet 60 mg Daily with breakfast    cloNIDine tablet 0.1 mg BID    cloNIDine tablet 0.1 mg Q6H PRN    heparin (porcine) injection 5,000 Units Q12H    hydrALAZINE tablet 50 mg Q8H    labetalol tablet 200 mg BID    ondansetron disintegrating tablet 8 mg Q8H PRN    predniSONE tablet 1 mg Every other day    senna-docusate 8.6-50 mg per tablet 1 tablet Daily PRN    sodium chloride 0.9% flush 3 mL Q8H    tacrolimus capsule 8 mg BID     Family History     Problem Relation (Age of Onset)    Hypertension Mother, Father        Social History Main Topics    Smoking status: Never Smoker    Smokeless tobacco: Never Used    Alcohol use No    Drug use: No    Sexual activity: Yes     Partners: Male     Review of Systems   Constitutional: Positive for chills and fever. Negative for appetite change, fatigue and unexpected weight change.        Generalized malaise   HENT: Negative for congestion, ear pain, sore throat and trouble swallowing.    Eyes: Negative for visual disturbance.   Respiratory: Negative for cough and shortness of breath.    Cardiovascular: Negative for chest pain and leg swelling.   Gastrointestinal: Negative for abdominal distention, abdominal  pain, constipation, diarrhea, nausea and vomiting.   Genitourinary: Negative for difficulty urinating, dysuria and hematuria.   Musculoskeletal: Negative for myalgias.   Skin: Negative for rash.   Neurological: Negative for dizziness, weakness, light-headedness and headaches.     Objective:     Vital Signs (Most Recent):  Temp: 98.9 °F (37.2 °C) (08/05/17 1200)  Pulse: 60 (08/05/17 1200)  Resp: 17 (08/05/17 0500)  BP: 129/72 (08/05/17 1528)  SpO2: 98 % (08/05/17 1200)  O2 Device (Oxygen Therapy): room air (08/05/17 0500) Vital Signs (24h Range):  Temp:  [98.3 °F (36.8 °C)-99.8 °F (37.7 °C)] 98.9 °F (37.2 °C)  Pulse:  [60-99] 60  Resp:  [16-18] 17  SpO2:  [98 %-100 %] 98 %  BP: (120-202)/() 129/72     Weight: 59 kg (130 lb) (08/05/17 0126)  Body mass index is 21.63 kg/m².  Body surface area is 1.64 meters squared.    No intake/output data recorded.    Physical Exam  Constitutional: She is oriented to person, place, and time. She appears well-developed and well-nourished.   HENT:   Head: Normocephalic and atraumatic.   Eyes: Pupils are equal, round, and reactive to light.   Neck: No JVD present.   Cardiovascular: Normal rate, regular rhythm and intact distal pulses.    No murmur heard.  Pulmonary/Chest: Effort normal and breath sounds normal. No respiratory distress.   Abdominal: Soft. Bowel sounds are normal. She exhibits no distension. There is no tenderness.   Musculoskeletal: She exhibits no edema.   AV fistula to left forearm - has good thrill. No erythema, inflammation, TTP around site   Neurological: She is alert and oriented to person, place, and time.   Skin: Skin is warm and dry. Capillary refill takes less than 2 seconds.   Psychiatric: She has a normal mood and affect    Significant Labs:  CBC:   Recent Labs  Lab 08/05/17  0203   WBC 5.51   RBC 3.53*   HGB 9.5*   HCT 29.1*   PLT 65*   MCV 82   MCH 26.9*   MCHC 32.6     CMP:   Recent Labs  Lab 08/05/17  0203      CALCIUM 9.3   ALBUMIN 3.1*    PROT 7.3   *   K 4.8   CO2 17*      BUN 57*   CREATININE 10.3*   ALKPHOS 639*   ALT 45*   AST 52*   BILITOT 0.8     All labs within the past 24 hours have been reviewed.

## 2017-08-05 NOTE — H&P
[facility] Hospital Medicine  History & Physical    Patient Name: Holly Patel  MRN: [id number]  Admission Date: 2017  Attending Physician: Brooklynn Kellogg MD   Primary Care Provider: [facility] Medicine Team: [facility] 4 Isha Boateng MD     Patient information was obtained from patient and past medical records.     Subjective:     Principal Problem:Urinary tract infection without hematuria    Chief Complaint:   Chief Complaint   Patient presents with    Fever     Reports temp 100.4 at home. No antipyretics taken PTA      HPI: 26 y.o. F with HTN, hemangioendothelioma s/p LTx (), ESRD on home hemodialysis (still makes urine), and , secondary PTH who presents with 1 day h/o fever. Pt reports that yesterday evening (8pm) she felt feverish, took her temp (100.6) and went to nap. When she woke up from her nap, she felt worse and had chills. She informed her mother of these symptoms and was brought to the ER.   She has not had similar symptoms, no prior h/o UTI. She denies any n/v/d, cough, sinus congestion, change in appetite, dysuria, urgency in last few or any sick contacts. Has not gone out of town.    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      Current  Outpatient Prescriptions on File Prior to Encounter   Medication Sig    amlodipine (NORVASC) 10 MG tablet Take 1 tablet (10 mg total) by mouth once daily.    cinacalcet (SENSIPAR) 60 MG Tab Take 1 tablet (60 mg total) by mouth daily with breakfast.    cloNIDine (CATAPRES) 0.1 MG tablet Take 1 tablet (0.1 mg total) by mouth 2 (two) times daily.    labetalol (NORMODYNE) 200 MG tablet Take 1 tablet (200 mg total) by mouth 2 (two) times daily.    predniSONE (DELTASONE) 1 MG tablet Take 1 mg by mouth every other day.    tacrolimus (PROGRAF) 1 MG Cap Take 8 capsules (8 mg total) by mouth every 12 (twelve) hours.    triamcinolone acetonide 0.1% (KENALOG) 0.1 % ointment AAA on arms, legs, and neck bid x 1-2 wks then prn flares only (Patient taking differently: Apply to affected area(s) on arms, legs, and neck twice daily x 1-2 wks then as needed for flares only)     Family History     Problem Relation (Age of Onset)    Hypertension Mother, Father        Social History Main Topics    Smoking status: Never Smoker    Smokeless tobacco: Never Used    Alcohol use No    Drug use: No    Sexual activity: Yes     Partners: Male     Review of Systems   Constitutional: Positive for chills and fever. Negative for activity change, appetite change, diaphoresis and unexpected weight change.   HENT: Negative for congestion, rhinorrhea, sinus pressure, sneezing and sore throat.    Respiratory: Negative for cough, chest tightness and shortness of breath.    Cardiovascular: Negative for chest pain, palpitations and leg swelling.   Gastrointestinal: Negative for abdominal distention, abdominal pain, constipation, diarrhea, nausea and vomiting.   Endocrine: Negative for polyuria.   Genitourinary: Negative for difficulty urinating, frequency, hematuria and urgency.   Musculoskeletal: Negative for arthralgias and myalgias.   Skin: Negative for pallor.   Neurological: Negative for weakness, light-headedness and headaches.      Objective:     Vital Signs (Most Recent):  Temp: 98.9 °F (37.2 °C) (08/05/17 0500)  Pulse: 88 (08/05/17 0500)  Resp: 17 (08/05/17 0500)  BP: (!) 202/98 (08/05/17 0500)  SpO2: 99 % (08/05/17 0500) Vital Signs (24h Range):  Temp:  [98.3 °F (36.8 °C)-99.8 °F (37.7 °C)] 98.9 °F (37.2 °C)  Pulse:  [82-99] 88  Resp:  [16-18] 17  SpO2:  [98 %-100 %] 99 %  BP: (178-202)/() 202/98     Weight: 59 kg (130 lb)  Body mass index is 21.63 kg/m².    Physical Exam   Constitutional: She is oriented to person, place, and time. She appears well-developed and well-nourished.   HENT:   Head: Normocephalic and atraumatic.   Eyes: Pupils are equal, round, and reactive to light.   Neck: No JVD present.   Cardiovascular: Normal rate, regular rhythm and intact distal pulses.    No murmur heard.  Pulmonary/Chest: Effort normal and breath sounds normal. No respiratory distress.   Abdominal: Soft. Bowel sounds are normal. She exhibits no distension. There is no tenderness.   Musculoskeletal: She exhibits no edema.   AV fistula on left arm - has good thrill. No erythema, inflammation, TTP around site   Neurological: She is alert and oriented to person, place, and time.   Skin: Skin is warm and dry. Capillary refill takes less than 2 seconds.   Psychiatric: She has a normal mood and affect. Her behavior is normal. Judgment and thought content normal.   Vitals reviewed.    Significant Labs:   CBC:   Recent Labs  Lab 08/05/17  0203   WBC 5.51   HGB 9.5*   HCT 29.1*   PLT 65*     CMP:   Recent Labs  Lab 08/05/17  0203   *   K 4.8      CO2 17*      BUN 57*   CREATININE 10.3*   CALCIUM 9.3   PROT 7.3   ALBUMIN 3.1*   BILITOT 0.8   ALKPHOS 639*   AST 52*   ALT 45*   ANIONGAP 12   EGFRNONAA 4.6*     Urinalysis  Lab 08/05/17  0200   COLORU Yellow   SPECGRAV 1.010   PHUR 7.0   PROTEINUA 3+*   BACTERIA None   NITRITE Negative   LEUKOCYTESUR 1+*   UROBILINOGEN Negative   HYALINECASTS 0     Significant Imaging: I have reviewed and  interpreted all pertinent imaging results/findings within the past 24 hours.    CXR: wnl    Assessment/Plan:    26 y.o. F with HTN, hemangioendothelioma s/p LTx (1992), ESRD on home hemodialysis (still makes urine), and , secondary PTH who presents with 1 day h/o fever and chills. Given her immunosuppression status, she likely has an infection. No leukocytosis or fevers in ER. CXR wnl. UA: 1+ leukos, 2+ blood, 3+ protein. BCx and UCx sent.     Fevers   Chills  - infection: bacteremia vs. Urine vs. Fistula thrombosis  - for bacteremia, BCx x2 8/4 ordered; results pending   - for UTI, possible given 1+ leuks on UA; started rocephin empirically. 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 Pt notes she dialyzes on Sun, Mon, Tue, Thu & Sat  Secondary hyperparathyroidism  Anemia of chronic disease  - Left arm fistula patent and has good thrill  - Nephrology consulted to help pt dialyze today  - continue home cinacalet    Hypertension urgency  - pt does not have sx or end-organ damage  - continue home BP meds amlodipine, clonidine and labetalol. Times adjusted for meds to be given early.     VTE Risk Mitigation         Ordered     heparin (porcine) injection 5,000 Units  Every 12 hours     Route:  Subcutaneous        08/05/17 0503     Medium Risk of VTE  Once      08/05/17 0634      Full code  Renal diet     Isha Boateng MD  Department of Hospital Medicine   [facility]

## 2017-08-05 NOTE — HPI
Ms. Patel is a 27 yo female with HTN, ESRD on home HD, and h/o liver transplant (1992, on long term immune suppression) who presented for fever and malaise x 1 day.  Patient denied any sick contacts, congestion, sorethroat, cough, N/V, or headache.  She was last dialyzed 2 days ago and normally received dialysis Sun, M, T, TH, Sat. Patient takes prograf and prednisone on a regular basis and has been compliant.

## 2017-08-05 NOTE — ED NOTES
Pt is resting comfortably in stretcher with eyes open. Pt is AAOx3.  Respirations are full, even, and non labored. NAD noted. Pt denies pain at this time. No needs verbalized at this time. Call bell is in reach, side rails are up x 2, and bed is in lowest, locked, position. Will continue to monitor. Pt updated on plan of care and verbalizes understanding

## 2017-08-05 NOTE — ASSESSMENT & PLAN NOTE
- Left arm fistula patent and has good thrill  - Nephrology consulted to help pt dialyze today  - continue home cinacalet

## 2017-08-05 NOTE — ED PROVIDER NOTES
Encounter Date: 2017    SCRIBE #1 NOTE: I, Philip Contreras, am scribing for, and in the presence of,  Dr. Byrne . I have scribed the entire note.   SCRIBE #2 NOTE: I, Jax Singleton, am scribing for, and in the presence of, Dr. Byrne.     History     Chief Complaint   Patient presents with    Fever     Reports temp 100.4 at home. No antipyretics taken PTA     Time patient was seen by the provider: 1:40 AM      The patient is a 26 y.o. female with hx of: a liver transplant at the age of 1 that presents to the ED with a complaint of a fever of 100.4 degrees at home. Associated sx include: HA and generalized body aches. Pt denies sore throat, cough, chest discomfort, and any urinary problems. She states that usually takes Excedrin for these sx. Pt has drug allergies to hydrocodone and chloral hydrate.         The history is provided by the patient and medical records.     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   HENT: Negative for sore throat.    Respiratory: Negative for cough, chest tightness and  shortness of breath.    Cardiovascular: Negative for chest pain.        - chest discomfort   Gastrointestinal: Negative for nausea.   Genitourinary: Negative for difficulty urinating, dysuria and hematuria.   Musculoskeletal: Negative for back pain.        + generalized body aches   Skin: Negative for rash.   Neurological: Positive for headaches. Negative for weakness.   Hematological: Does not bruise/bleed easily.       Physical Exam     Initial Vitals [08/05/17 0126]   BP Pulse Resp Temp SpO2   (!) 188/109 99 18 98.3 °F (36.8 °C) 100 %      MAP       135.33         Physical Exam    Nursing note and vitals reviewed.  Constitutional: She appears well-developed and well-nourished. She is not diaphoretic. No distress.   HENT:   Head: Normocephalic and atraumatic.   Mouth/Throat: Oropharynx is clear and moist.   Eyes: Conjunctivae and EOM are normal. Pupils are equal, round, and reactive to light.   Neck: Normal range of motion. Neck supple. No JVD present.   Cardiovascular: Normal rate, regular rhythm and normal heart sounds.   No murmur heard.  Pulmonary/Chest: Breath sounds normal. No respiratory distress. She has no wheezes. She has no rhonchi. She has no rales.   Abdominal: Soft. Bowel sounds are normal. She exhibits no mass. There is no tenderness. There is no rebound and no guarding.   Musculoskeletal: Normal range of motion. She exhibits no edema or tenderness.   Neurological: She is alert and oriented to person, place, and time. She has normal strength. No cranial nerve deficit or sensory deficit.   Skin: Skin is warm and dry. No rash noted.   Psychiatric: She has a normal mood and affect.         ED Course   Procedures  Labs Reviewed   CBC W/ AUTO DIFFERENTIAL - Abnormal; Notable for the following:        Result Value    RBC 3.53 (*)     Hemoglobin 9.5 (*)     Hematocrit 29.1 (*)     MCH 26.9 (*)     RDW 14.9 (*)     Platelets 65 (*)     Lymph # 0.4 (*)     Gran% 84.2 (*)     Lymph% 7.3 (*)     Platelet  Estimate Decreased (*)     All other components within normal limits   COMPREHENSIVE METABOLIC PANEL - Abnormal; Notable for the following:     Sodium 135 (*)     CO2 17 (*)     BUN, Bld 57 (*)     Creatinine 10.3 (*)     Albumin 3.1 (*)     Alkaline Phosphatase 639 (*)     AST 52 (*)     ALT 45 (*)     eGFR if  5.4 (*)     eGFR if non  4.6 (*)     All other components within normal limits   URINALYSIS, REFLEX TO URINE CULTURE - Abnormal; Notable for the following:     Appearance, UA Hazy (*)     Protein, UA 3+ (*)     Glucose, UA 1+ (*)     Occult Blood UA 2+ (*)     Leukocytes, UA 1+ (*)     All other components within normal limits    Narrative:     Preferred Collection Type->Urine, Clean Catch   URINALYSIS MICROSCOPIC - Abnormal; Notable for the following:     RBC, UA 37 (*)     WBC, UA 6 (*)     All other components within normal limits    Narrative:     Preferred Collection Type->Urine, Clean Catch   CULTURE, BLOOD   CULTURE, BLOOD   INFLUENZA A AND B ANTIGEN   PROTIME-INR   POCT URINE PREGNANCY        X-Ray Chest PA And Lateral   Final Result      No acute intrathoracic abnormality.   ______________________________________       Electronically signed by resident: LEVI AGARAWL MD   Date:     08/05/17   Time:    02:22                  As the supervising and teaching physician, I personally reviewed the images and resident's interpretation and I agree with the findings.               Electronically signed by: CHILANGO TORRES MD   Date:     08/05/17   Time:    02:31           X-Rays:   Independently Interpreted Readings:   Chest X-Ray: Normal heart. Normal lungs     Medical Decision Making:   History:   Old Medical Records: I decided to obtain old medical records.  Initial Assessment:   Pt with low-grade fever and liver transplant presents with sx that sound viral. Will check labs, CXR, and UA.   Independently Interpreted Test(s):   I have ordered and independently interpreted  X-rays - see prior notes.  Clinical Tests:   Lab Tests: Ordered and Reviewed  Radiological Study: Ordered and Reviewed            Scribe Attestation:   Scribe #1: I performed the above scribed service and the documentation accurately describes the services I performed. I attest to the accuracy of the note.  Scribe #2: I performed the above scribed service and the documentation accurately describes the services I performed. I attest to the accuracy of the note.    Attending Attestation:           Physician Attestation for Scribe:  Physician Attestation Statement for Scribe #1: I, Dr. Byrne , reviewed documentation, as scribed by Philip Contreras in my presence, and it is both accurate and complete.   Physician Attestation Statement for Scribe #2: I, Dr. Byrne, reviewed documentation, as scribed by Jax Singleton in my presence, and it is both accurate and complete. I also acknowledge and confirm the content of the note done by Scribe #1.      Attending ED Notes:   3:33 AM  Reviewed labs, she has a urinary tract infection. Chest x-ray shows a normal heart and normal lungs. Will discuss with medicine     3:36 AM  Discussed with medicine, will admit           ED Course     Clinical Impression:   The encounter diagnosis was Urinary tract infection without hematuria, site unspecified.    Disposition:   Disposition: Admitted                        Alfredo Byrne MD  08/06/17 0541

## 2017-08-05 NOTE — ED NOTES
Pt is resting comfortably in stretcher with eyes closed. Respirations are full, even, and non labored. NAD noted. Call bell is in reach, side rails are up x 2, and bed is in lowest, locked, position. Will continue to monitor.

## 2017-08-05 NOTE — ED NOTES
Pt presents to ed c/o fever at home 100.4 with generalized body aches- dialysis pt  On hd @ home - received today. Pt  Denies cp, sob,  Sore throat. C/o headache.  - no medications taken at home

## 2017-08-05 NOTE — ASSESSMENT & PLAN NOTE
- infection: bacteremia vs. Urine vs. Fistula thrombosis  - for bacteremia, BCx x2 8/4 ordered; results pending   - for UTI, possible given 1+ leuks on UA; started rocephin empirically. May adjust based on urine gram stain  - for fistula thrombosis - very unlikely since thrill palpable and appears patent  - will continue to follow up culture results

## 2017-08-05 NOTE — SUBJECTIVE & OBJECTIVE
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      Current Outpatient Prescriptions on File Prior to Encounter   Medication Sig    amlodipine (NORVASC) 10 MG tablet Take 1 tablet (10 mg total) by mouth once daily.    cinacalcet (SENSIPAR) 60 MG Tab Take 1 tablet (60 mg total) by mouth daily with breakfast.    cloNIDine (CATAPRES) 0.1 MG tablet Take 1 tablet (0.1 mg total) by mouth 2 (two) times daily.    labetalol (NORMODYNE) 200 MG tablet Take 1 tablet (200 mg total) by mouth 2 (two) times daily.    predniSONE (DELTASONE) 1 MG tablet Take 1 mg by mouth every other day.    tacrolimus (PROGRAF) 1 MG Cap Take 8 capsules (8 mg total) by mouth every 12 (twelve) hours.    triamcinolone acetonide 0.1% (KENALOG) 0.1 % ointment AAA on arms, legs, and neck bid x 1-2 wks then prn flares only (Patient taking differently: Apply to affected area(s) on arms, legs, and neck twice daily x 1-2 wks then as needed for flares only)     Family History     Problem Relation (Age of Onset)    Hypertension Mother, Father        Social History Main Topics    Smoking status: Never Smoker    Smokeless tobacco: Never Used    Alcohol use No    Drug use: No    Sexual activity: Yes     Partners: Male     Review of Systems   Constitutional: Positive for chills and  fever. Negative for activity change, appetite change, diaphoresis and unexpected weight change.   HENT: Negative for congestion, rhinorrhea, sinus pressure, sneezing and sore throat.    Respiratory: Negative for cough, chest tightness and shortness of breath.    Cardiovascular: Negative for chest pain, palpitations and leg swelling.   Gastrointestinal: Negative for abdominal distention, abdominal pain, constipation, diarrhea, nausea and vomiting.   Endocrine: Negative for polyuria.   Genitourinary: Negative for difficulty urinating, frequency, hematuria and urgency.   Musculoskeletal: Negative for arthralgias and myalgias.   Skin: Negative for pallor.   Neurological: Negative for weakness, light-headedness and headaches.     Objective:     Vital Signs (Most Recent):  Temp: 98.9 °F (37.2 °C) (08/05/17 0500)  Pulse: 88 (08/05/17 0500)  Resp: 17 (08/05/17 0500)  BP: (!) 202/98 (08/05/17 0500)  SpO2: 99 % (08/05/17 0500) Vital Signs (24h Range):  Temp:  [98.3 °F (36.8 °C)-99.8 °F (37.7 °C)] 98.9 °F (37.2 °C)  Pulse:  [82-99] 88  Resp:  [16-18] 17  SpO2:  [98 %-100 %] 99 %  BP: (178-202)/() 202/98     Weight: 59 kg (130 lb)  Body mass index is 21.63 kg/m².    Physical Exam   Constitutional: She is oriented to person, place, and time. She appears well-developed and well-nourished.   HENT:   Head: Normocephalic and atraumatic.   Eyes: Pupils are equal, round, and reactive to light.   Neck: No JVD present.   Cardiovascular: Normal rate, regular rhythm and intact distal pulses.    No murmur heard.  Pulmonary/Chest: Effort normal and breath sounds normal. No respiratory distress.   Abdominal: Soft. Bowel sounds are normal. She exhibits no distension. There is no tenderness.   Musculoskeletal: She exhibits no edema.   AV fistula on left arm - has good thrill. No erythema, inflammation, TTP around site   Neurological: She is alert and oriented to person, place, and time.   Skin: Skin is warm and dry. Capillary refill  takes less than 2 seconds.   Psychiatric: She has a normal mood and affect. Her behavior is normal. Judgment and thought content normal.   Vitals reviewed.    Significant Labs:   CBC:   Recent Labs  Lab 08/05/17 0203   WBC 5.51   HGB 9.5*   HCT 29.1*   PLT 65*     CMP:   Recent Labs  Lab 08/05/17 0203   *   K 4.8      CO2 17*      BUN 57*   CREATININE 10.3*   CALCIUM 9.3   PROT 7.3   ALBUMIN 3.1*   BILITOT 0.8   ALKPHOS 639*   AST 52*   ALT 45*   ANIONGAP 12   EGFRNONAA 4.6*     Significant Imaging: I have reviewed and interpreted all pertinent imaging results/findings within the past 24 hours.

## 2017-08-05 NOTE — DISCHARGE SUMMARY
[facility] Hospital Medicine  Discharge Summary      Patient Name: Holly Patel  MRN: [id number]  Admission Date: 8/5/2017  Hospital Length of Stay: 0 days  Discharge Date and Time:  08/05/2017 2:55 PM  Attending Physician: Brooklynn Kellogg MD   Discharging Provider: TRISHA Wells  Primary Care Provider: [facility] Medicine Team: [facility] MED 4 TRISHA Wells    HPI:   26 y.o. F with HTN, hemangioendothelioma s/p LTx (1992), ESRD on home hemodialysis (still makes urine), and , secondary PTH who presents with 1 day h/o fever. Pt reports that yesterday evening (8pm) she felt feverish, took her temp (100.6) and went to nap. When she woke up from her nap, she felt worse and had chills. She informed her mother of these symptoms and was brought to the ER.   She has not had similar symptoms, no prior h/o UTI. She denies any n/v/d, cough, sinus congestion, change in appetite, dysuria, urgency in last few or any sick contacts. Has not gone out of town.       * No surgery found *      Indwelling Lines/Drains at time of discharge:   Lines/Drains/Airways     Drain                 Hemodialysis AV Fistula Left forearm [id number] days              Hospital Course:   -- afebrile since admission, with no localizing symptom, UA with 6 WBC, +1 leukocyte but with no bacteria, negative nitrite. In the settings of proteinuria in ESRD. And no  symptoms. LA 0.06 and procalc 0.97 and negative flu swaps. Likely febrile illness.   -- during her stay she had HTN urgency, with -180, restart home meds and added PRN clonidine last .         Review of Systems   Constitutional:  Negative for activity change, appetite change, diaphoresis and unexpected weight change.   HENT: Negative for congestion, rhinorrhea, sinus pressure, sneezing and sore throat.    Respiratory: Negative for cough, chest tightness and shortness of breath.    Cardiovascular:  Negative for chest pain, palpitations and leg swelling.   Gastrointestinal: Negative for abdominal distention, abdominal pain, constipation, diarrhea, nausea and vomiting.   Endocrine: Negative for polyuria.   Genitourinary: Negative for difficulty urinating, frequency, hematuria and urgency.   Musculoskeletal: Negative for arthralgias and myalgias.   Skin: Negative for pallor.   Neurological: Negative for weakness, light-headedness and headaches.      Objective:      Vital Sign Min/Max (last 24 hours)     Value Min Max   Temp 98.3 °F (36.8 °C) 99.8 °F (37.7 °C)   Pulse 60 99   Resp 16 18   BP: Systolic 120 202   BP: Diastolic 56 109   SpO2 98 % 100 %       Weight: 59 kg (130 lb)  Body mass index is 21.63 kg/m².     Physical Exam   Constitutional: She is oriented to person, place, and time. She appears well-developed and well-nourished.   HENT:   Head: Normocephalic and atraumatic.   Eyes: Pupils are equal, round, and reactive to light.   Neck: No JVD present.   Cardiovascular: Normal rate, regular rhythm and intact distal pulses.    No murmur heard.  Pulmonary/Chest: Effort normal and breath sounds normal. No respiratory distress.   Abdominal: Soft. Bowel sounds are normal. She exhibits no distension. There is no tenderness.   Musculoskeletal: She exhibits no edema.   AV fistula on left arm - has good thrill. No erythema, inflammation, TTP around site   Neurological: She is alert and oriented to person, place, and time.   Skin: Skin is warm and dry. Capillary refill takes less than 2 seconds.   Psychiatric: She has a normal mood and affect. Her behavior is normal. Judgment and thought content normal.   Vitals reviewed.        Consults:   Consults         Status Ordering Provider     Inpatient consult to Nephrology  Once     Provider:  (Not yet assigned)    Acknowledged DINORAH BLANCA          Significant Diagnostic Studies: Labs:   BMP:   Recent Labs  Lab 08/05/17  0203      *   K 4.8      CO2 17*    BUN 57*   CREATININE 10.3*   CALCIUM 9.3   , CMP   Recent Labs  Lab 08/05/17  0203   *   K 4.8      CO2 17*      BUN 57*   CREATININE 10.3*   CALCIUM 9.3   PROT 7.3   ALBUMIN 3.1*   BILITOT 0.8   ALKPHOS 639*   AST 52*   ALT 45*   ANIONGAP 12   ESTGFRAFRICA 5.4*   EGFRNONAA 4.6*    and CBC   Recent Labs  Lab 08/05/17  0203   WBC 5.51   HGB 9.5*   HCT 29.1*   PLT 65*     Radiology: X-Ray: CXR: X-Ray Chest PA and Lateral (CXR):   Results for orders placed or performed during the hospital encounter of 08/05/17   X-Ray Chest PA And Lateral    Narrative    CHEST XRAY, TWO VIEWS, PA AND LATERAL    INDICATION:  Chest pain.    COMPARISON:  Chest radiograph 4/12/2016.    FINDINGS:  The cardiomediastinal silhouette is within normal limits. The trachea is midline. Pulmonary vascular pattern is unremarkable. Susi are normal. The lungs are equally well expanded. No pleural effusion or pneumothorax. No abnormal opacities. No pneumoperitoneum. Osseous structures and soft tissues are without abnormality.    Impression    No acute intrathoracic abnormality.  ______________________________________     Electronically signed by resident: LEVI AGARWAL MD  Date:     08/05/17  Time:    02:22            As the supervising and teaching physician, I personally reviewed the images and resident's interpretation and I agree with the findings.          Electronically signed by: CHILANGO TORRES MD  Date:     08/05/17  Time:    02:31        Pending Diagnostic Studies:     None        Final Active Diagnoses:    Diagnosis Date Noted POA    PRINCIPAL PROBLEM:  Febrile illness [R50.9] 08/05/2017 Yes    Immunosuppressed [D89.9] 08/05/2017 Yes    Secondary hyperparathyroidism [N25.81] 08/05/2017 Yes    Anemia [D64.9] 10/12/2015 Yes    ESRD (end stage renal disease) [N18.6] 09/30/2015 Yes    Hypertensive urgency, malignant [I16.0] 10/06/2014 Yes   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 [Z94.4] 09/10/2012 Not Applicable      Problems  Resolved During this Admission:    Diagnosis Date Noted Date Resolved POA    Chills [R68.83] 08/05/2017 08/05/2017 Yes    Fever [R50.9] 04/12/2016 08/05/2017 Yes      Secondary hyperparathyroidism    - Left arm fistula patent and has good thrill  - Nephrology consulted to help pt dialyze today  - continue home cinacalet             Anemia              ESRD (end stage renal disease)    on home HD. Pt notes she dialyzes on Sun, Mon, Tue, Thu & Sat          Hypertensive urgency, malignant    - pt does not have sx or end-organ damage  - continue home BP meds amlodipine, clonidine and labetalol. Times adjusted for meds to be given early.           Liver replaced by transplant    - pt on prograf & prednisone at home  - continuing prednisone while in hospital  - prograf held in light of infection; prograf level in process  - may consider Hepatology consult for assistance with prograf mgmt          Fever-resolved as of 8/5/2017    - infection: bacteremia vs. Urine vs. Fistula thrombosis  - for bacteremia, BCx x2 8/4 ordered; results pending   - for UTI, possible given 1+ leuks on UA; started rocephin empirically. May adjust based on urine gram stain  - for fistula thrombosis - very unlikely since thrill palpable and appears patent  - will continue to follow up culture results              Discharged Condition: good    Disposition: Home or Self Care    Follow Up:  Follow-up Information     [facility].               Patient Instructions:     Diet general     Call MD for:  temperature >100.4     Call MD for:  persistent nausea and vomiting or diarrhea     Call MD for:  redness, tenderness, or signs of infection (pain, swelling, redness, odor or green/yellow discharge around incision site)     Call MD for:  severe persistent headache     Call MD for:  persistent dizziness, light-headedness, or visual disturbances       Medications:  Reconciled Home Medications:   Current Discharge Medication List       CONTINUE these medications which have NOT CHANGED    Details   amlodipine (NORVASC) 10 MG tablet Take 1 tablet (10 mg total) by mouth once daily.  Qty: 7 tablet, Refills: 1    Associated Diagnoses: Hypertension      cinacalcet (SENSIPAR) 60 MG Tab Take 1 tablet (60 mg total) by mouth daily with breakfast.  Qty: 30 tablet, Refills: 11      cloNIDine (CATAPRES) 0.1 MG tablet Take 1 tablet (0.1 mg total) by mouth 2 (two) times daily.  Qty: 60 tablet, Refills: 11      labetalol (NORMODYNE) 200 MG tablet Take 1 tablet (200 mg total) by mouth 2 (two) times daily.  Qty: 60 tablet, Refills: 11      predniSONE (DELTASONE) 1 MG tablet Take 1 mg by mouth every other day.      tacrolimus (PROGRAF) 1 MG Cap Take 8 capsules (8 mg total) by mouth every 12 (twelve) hours.  Qty: 480 capsule, Refills: 11    Associated Diagnoses: Liver transplanted      triamcinolone acetonide 0.1% (KENALOG) 0.1 % ointment AAA on arms, legs, and neck bid x 1-2 wks then prn flares only  Qty: 80 g, Refills: 3    Associated Diagnoses: Atopic dermatitis               HOS POC IP DISCHARGE SUMMARY    TRISHA Wells  Department of Hospital Medicine  [facility]

## 2017-08-06 ENCOUNTER — HOSPITAL ENCOUNTER (INPATIENT)
Facility: HOSPITAL | Age: 27
LOS: 3 days | Discharge: HOME | DRG: 871 | End: 2017-08-09
Attending: EMERGENCY MEDICINE | Admitting: INTERNAL MEDICINE
Payer: MEDICARE

## 2017-08-06 DIAGNOSIS — D84.9 IMMUNOSUPPRESSION: ICD-10-CM

## 2017-08-06 DIAGNOSIS — R78.81 STAPHYLOCOCCUS AUREUS BACTEREMIA: ICD-10-CM

## 2017-08-06 DIAGNOSIS — N25.81 SECONDARY HYPERPARATHYROIDISM: ICD-10-CM

## 2017-08-06 DIAGNOSIS — T86.41 CHRONIC REJECTION OF LIVER TRANSPLANT: ICD-10-CM

## 2017-08-06 DIAGNOSIS — B95.62 MRSA BACTEREMIA: ICD-10-CM

## 2017-08-06 DIAGNOSIS — D63.1 ANEMIA IN ESRD (END-STAGE RENAL DISEASE): Chronic | ICD-10-CM

## 2017-08-06 DIAGNOSIS — D69.6 THROMBOCYTOPENIA: ICD-10-CM

## 2017-08-06 DIAGNOSIS — R78.81 BACTEREMIA: Primary | ICD-10-CM

## 2017-08-06 DIAGNOSIS — I33.0 VEGETATIVE ENDOCARDITIS OF MITRAL VALVE: ICD-10-CM

## 2017-08-06 DIAGNOSIS — N18.6 ANEMIA IN ESRD (END-STAGE RENAL DISEASE): Chronic | ICD-10-CM

## 2017-08-06 DIAGNOSIS — Z94.4 LIVER REPLACED BY TRANSPLANT: Chronic | ICD-10-CM

## 2017-08-06 DIAGNOSIS — B95.61 STAPHYLOCOCCUS AUREUS BACTEREMIA: ICD-10-CM

## 2017-08-06 DIAGNOSIS — Z29.89 PROPHYLACTIC IMMUNOTHERAPY: ICD-10-CM

## 2017-08-06 DIAGNOSIS — I15.0 RENOVASCULAR HYPERTENSION: ICD-10-CM

## 2017-08-06 DIAGNOSIS — I16.0 HYPERTENSIVE URGENCY, MALIGNANT: ICD-10-CM

## 2017-08-06 DIAGNOSIS — N18.6 ESRD ON HEMODIALYSIS: Chronic | ICD-10-CM

## 2017-08-06 DIAGNOSIS — Z99.2 ESRD ON HEMODIALYSIS: Chronic | ICD-10-CM

## 2017-08-06 DIAGNOSIS — R78.81 MRSA BACTEREMIA: ICD-10-CM

## 2017-08-06 PROBLEM — A41.01 STAPHYLOCOCCUS AUREUS SEPTICEMIA: Status: ACTIVE | Noted: 2017-08-06

## 2017-08-06 PROBLEM — K76.89 LIVER DYSFUNCTION: Status: RESOLVED | Noted: 2017-06-16 | Resolved: 2017-08-06

## 2017-08-06 PROBLEM — R50.9 FEBRILE ILLNESS: Status: RESOLVED | Noted: 2017-08-05 | Resolved: 2017-08-06

## 2017-08-06 LAB
ANION GAP SERPL CALC-SCNC: 14 MMOL/L
BASOPHILS # BLD AUTO: 0.01 K/UL
BASOPHILS NFR BLD: 0.3 %
BUN SERPL-MCNC: 60 MG/DL
CALCIUM SERPL-MCNC: 8.8 MG/DL
CHLORIDE SERPL-SCNC: 107 MMOL/L
CO2 SERPL-SCNC: 16 MMOL/L
CREAT SERPL-MCNC: 11 MG/DL
DIASTOLIC DYSFUNCTION: NO
DIFFERENTIAL METHOD: ABNORMAL
EOSINOPHIL # BLD AUTO: 0.1 K/UL
EOSINOPHIL NFR BLD: 2.3 %
ERYTHROCYTE [DISTWIDTH] IN BLOOD BY AUTOMATED COUNT: 14.8 %
EST. GFR  (AFRICAN AMERICAN): 4.9 ML/MIN/1.73 M^2
EST. GFR  (NON AFRICAN AMERICAN): 4.3 ML/MIN/1.73 M^2
ESTIMATED PA SYSTOLIC PRESSURE: 24.9
GLUCOSE SERPL-MCNC: 94 MG/DL
HCT VFR BLD AUTO: 28.6 %
HGB BLD-MCNC: 9.6 G/DL
LYMPHOCYTES # BLD AUTO: 0.4 K/UL
LYMPHOCYTES NFR BLD: 11.1 %
MCH RBC QN AUTO: 27.2 PG
MCHC RBC AUTO-ENTMCNC: 33.6 G/DL
MCV RBC AUTO: 81 FL
MITRAL VALVE MOBILITY: NORMAL
MITRAL VALVE REGURGITATION: NORMAL
MONOCYTES # BLD AUTO: 0.4 K/UL
MONOCYTES NFR BLD: 9.8 %
NEUTROPHILS # BLD AUTO: 3 K/UL
NEUTROPHILS NFR BLD: 76.5 %
PLATELET # BLD AUTO: 72 K/UL
PMV BLD AUTO: ABNORMAL FL
POTASSIUM SERPL-SCNC: 4.7 MMOL/L
RBC # BLD AUTO: 3.53 M/UL
RETIRED EF AND QEF - SEE NOTES: 60 (ref 55–65)
SODIUM SERPL-SCNC: 137 MMOL/L
WBC # BLD AUTO: 3.96 K/UL

## 2017-08-06 PROCEDURE — 25000003 PHARM REV CODE 250: Performed by: EMERGENCY MEDICINE

## 2017-08-06 PROCEDURE — 11000001 HC ACUTE MED/SURG PRIVATE ROOM

## 2017-08-06 PROCEDURE — 85025 COMPLETE CBC W/AUTO DIFF WBC: CPT

## 2017-08-06 PROCEDURE — 99222 1ST HOSP IP/OBS MODERATE 55: CPT | Mod: AI,GC,, | Performed by: INTERNAL MEDICINE

## 2017-08-06 PROCEDURE — 80048 BASIC METABOLIC PNL TOTAL CA: CPT

## 2017-08-06 PROCEDURE — 96366 THER/PROPH/DIAG IV INF ADDON: CPT

## 2017-08-06 PROCEDURE — 63600175 PHARM REV CODE 636 W HCPCS: Performed by: STUDENT IN AN ORGANIZED HEALTH CARE EDUCATION/TRAINING PROGRAM

## 2017-08-06 PROCEDURE — 93306 TTE W/DOPPLER COMPLETE: CPT

## 2017-08-06 PROCEDURE — 99223 1ST HOSP IP/OBS HIGH 75: CPT | Mod: ,,, | Performed by: INTERNAL MEDICINE

## 2017-08-06 PROCEDURE — 99285 EMERGENCY DEPT VISIT HI MDM: CPT | Mod: ,,, | Performed by: EMERGENCY MEDICINE

## 2017-08-06 PROCEDURE — 99284 EMERGENCY DEPT VISIT MOD MDM: CPT | Mod: 25

## 2017-08-06 PROCEDURE — 96365 THER/PROPH/DIAG IV INF INIT: CPT

## 2017-08-06 PROCEDURE — 63600175 PHARM REV CODE 636 W HCPCS: Performed by: EMERGENCY MEDICINE

## 2017-08-06 PROCEDURE — 87040 BLOOD CULTURE FOR BACTERIA: CPT

## 2017-08-06 PROCEDURE — 93306 TTE W/DOPPLER COMPLETE: CPT | Mod: 26,,, | Performed by: INTERNAL MEDICINE

## 2017-08-06 PROCEDURE — 25000003 PHARM REV CODE 250: Performed by: STUDENT IN AN ORGANIZED HEALTH CARE EDUCATION/TRAINING PROGRAM

## 2017-08-06 RX ORDER — PREDNISONE 1 MG/1
1 TABLET ORAL EVERY OTHER DAY
Status: DISCONTINUED | OUTPATIENT
Start: 2017-08-07 | End: 2017-08-09 | Stop reason: HOSPADM

## 2017-08-06 RX ORDER — AMLODIPINE BESYLATE 10 MG/1
10 TABLET ORAL DAILY
Status: DISCONTINUED | OUTPATIENT
Start: 2017-08-06 | End: 2017-08-09 | Stop reason: HOSPADM

## 2017-08-06 RX ORDER — TACROLIMUS 1 MG/1
8 CAPSULE ORAL EVERY MORNING
Status: DISCONTINUED | OUTPATIENT
Start: 2017-08-06 | End: 2017-08-07

## 2017-08-06 RX ORDER — CLONIDINE HYDROCHLORIDE 0.1 MG/1
0.1 TABLET ORAL 2 TIMES DAILY
Status: DISCONTINUED | OUTPATIENT
Start: 2017-08-06 | End: 2017-08-06

## 2017-08-06 RX ORDER — CLONIDINE HYDROCHLORIDE 0.1 MG/1
0.1 TABLET ORAL 2 TIMES DAILY
Status: DISCONTINUED | OUTPATIENT
Start: 2017-08-06 | End: 2017-08-09 | Stop reason: HOSPADM

## 2017-08-06 RX ORDER — CINACALCET 30 MG/1
60 TABLET, FILM COATED ORAL
Status: DISCONTINUED | OUTPATIENT
Start: 2017-08-07 | End: 2017-08-06

## 2017-08-06 RX ORDER — LABETALOL 200 MG/1
200 TABLET, FILM COATED ORAL 2 TIMES DAILY
Status: DISCONTINUED | OUTPATIENT
Start: 2017-08-06 | End: 2017-08-09 | Stop reason: HOSPADM

## 2017-08-06 RX ORDER — ONDANSETRON 4 MG/1
4 TABLET, FILM COATED ORAL EVERY 6 HOURS PRN
Status: DISCONTINUED | OUTPATIENT
Start: 2017-08-06 | End: 2017-08-09 | Stop reason: HOSPADM

## 2017-08-06 RX ORDER — CINACALCET 30 MG/1
60 TABLET, FILM COATED ORAL
Status: DISCONTINUED | OUTPATIENT
Start: 2017-08-06 | End: 2017-08-09 | Stop reason: HOSPADM

## 2017-08-06 RX ORDER — AMLODIPINE BESYLATE 10 MG/1
10 TABLET ORAL DAILY
Status: DISCONTINUED | OUTPATIENT
Start: 2017-08-06 | End: 2017-08-06

## 2017-08-06 RX ORDER — LABETALOL 200 MG/1
200 TABLET, FILM COATED ORAL 2 TIMES DAILY
Status: DISCONTINUED | OUTPATIENT
Start: 2017-08-06 | End: 2017-08-06

## 2017-08-06 RX ADMIN — VANCOMYCIN HYDROCHLORIDE 750 MG: 750 INJECTION, POWDER, LYOPHILIZED, FOR SOLUTION INTRAVENOUS at 09:08

## 2017-08-06 RX ADMIN — CLONIDINE HYDROCHLORIDE 0.1 MG: 0.1 TABLET ORAL at 08:08

## 2017-08-06 RX ADMIN — LABETALOL HYDROCHLORIDE 200 MG: 200 TABLET, FILM COATED ORAL at 08:08

## 2017-08-06 RX ADMIN — LABETALOL HYDROCHLORIDE 200 MG: 200 TABLET, FILM COATED ORAL at 12:08

## 2017-08-06 RX ADMIN — CLONIDINE HYDROCHLORIDE 0.1 MG: 0.1 TABLET ORAL at 12:08

## 2017-08-06 RX ADMIN — CINACALCET HYDROCHLORIDE 60 MG: 30 TABLET, COATED ORAL at 12:08

## 2017-08-06 RX ADMIN — AMLODIPINE BESYLATE 10 MG: 10 TABLET ORAL at 12:08

## 2017-08-06 RX ADMIN — TACROLIMUS 8 MG: 1 CAPSULE, GELATIN COATED ORAL at 12:08

## 2017-08-06 NOTE — CONSULTS
[facility]  Infectious Disease  Consult Note    Patient Name: Holly Patel  MRN: [id number]  Admission Date: 8/6/2017  Hospital Length of Stay: 0 days  Attending Physician: Brooklynn Kellogg MD  Primary Care Provider: [facility]     Isolation Status: No active isolations    Patient information was obtained from patient, relative(s) and past medical records.      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 She is stable on empiric vanc/CTX.    - would continue vancomycin with HD til sensitivities are known   - would obtain TTE given S.aureus septicemia  - would obtain ultrasound of dialysis access to assess for abscess  - await pending repeat cultures  - additional source workup pending results of the above and duration of septicemia  - of note, patient will require parenteral antibiotics and dosing unlikely to be appropriate on home dialysis -- as such, she will require TIW HD on discharge while on this antibiotics course            Thank you for your consult. I will follow-up with patient. Please contact us if you have any additional questions.     Zarina Cheema MD  Transplant ID  Attending  [id number]    Zarina Gilbert MD  Infectious Disease  [facility]    Subjective:     Principal Problem: Staphylococcus aureus septicemia    HPI: Ms. 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 She was administered empiric CTX and discharged yesterday when preliminary culture results returned as unrevealing and given that she felt well and was afebrile in house. She was readmitted today on 2017 due to those prior blood cultures becoming positive with probably Staph species, now known to be S.aureus. Patient denies recent skin cuts, sores, or redness/swelling to her fistula. She denies HA, AMS, URI symptoms, cough, SOB, N/V/D, abdominal pain, dysuria, or rash. She has no indwelling prostheses or vascular cathethers.    Past Medical History:   Diagnosis Date    Allergy     Anemia requiring transfusions 2015    ESRD (end stage renal disease) 2015    Hypertension     Kidney disease     Liver transplanted     Malignant hypertension with chronic kidney disease stage IV 2014    Renal disorder        Past Surgical History:   Procedure Laterality Date     SECTION      2     LIVER BIOPSY      LIVER TRANSPLANT  1992    TUBAL LIGATION         Review of patient's allergies indicates:   Allergen Reactions    Chloral hydrate      Other reaction(s): Hallucinations  Other reaction(s): Hives    Hydrocodone        Medications:  Prescriptions Prior to Admission   Medication Sig     amlodipine (NORVASC) 10 MG tablet Take 1 tablet (10 mg total) by mouth once daily.    cinacalcet (SENSIPAR) 60 MG Tab Take 1 tablet (60 mg total) by mouth daily with breakfast.    cloNIDine (CATAPRES) 0.1 MG tablet Take 1 tablet (0.1 mg total) by mouth 2 (two) times daily.    labetalol (NORMODYNE) 200 MG tablet Take 1 tablet (200 mg total) by mouth 2 (two) times daily.    predniSONE (DELTASONE) 1 MG tablet Take 1 mg by mouth every other day.    tacrolimus (PROGRAF) 1 MG Cap Take 8 capsules (8 mg total) by mouth every 12 (twelve) hours.    triamcinolone acetonide 0.1% (KENALOG) 0.1 % ointment AAA on arms, legs, and neck bid x 1-2 wks then prn flares only (Patient taking differently: Apply to affected area(s) on arms, legs, and neck twice daily x 1-2 wks then as needed for flares only)     Antibiotics     None        Antifungals     None        Antivirals     None           Immunization History   Administered Date(s) Administered    PPD Test 09/17/2015, 10/02/2015    influenza - Quadrivalent - PF (ADULT) 10/12/2015       Family History     Problem Relation (Age of Onset)    Hypertension Mother, Father        Social History     Social History    Marital status:      Spouse name: N/A    Number of children: N/A    Years of education: N/A     Social History Main Topics    Smoking status: Never Smoker    Smokeless tobacco: Never Used    Alcohol use No    Drug use: No    Sexual activity: Yes     Partners: Male     Other Topics Concern    Are You Pregnant Or Think You May Be? No    Breast-Feeding No     Social History Narrative    ** Merged History Encounter **          Review of Systems   Constitutional: Negative for activity change, appetite change, chills, diaphoresis and fever.   HENT: Negative for ear pain, mouth sores, sinus pressure and sore throat.    Eyes: Negative for photophobia, pain and redness.   Respiratory: Negative for cough, shortness of breath and wheezing.    Cardiovascular:  Negative for chest pain and leg swelling.   Gastrointestinal: Negative for abdominal distention, abdominal pain, diarrhea and nausea.   Genitourinary: Negative for dysuria, flank pain, frequency and urgency.   Musculoskeletal: Negative for arthralgias, back pain, gait problem and myalgias.   Skin: Negative for pallor and rash.   Neurological: Negative for dizziness, tremors, seizures and headaches.   Psychiatric/Behavioral: Negative for confusion.     Objective:     Vital Signs (Most Recent):  Temp: 98.1 °F (36.7 °C) (08/06/17 1149)  Pulse: 83 (08/06/17 1149)  Resp: 18 (08/06/17 1149)  BP: (!) 182/116 (md notified about restarting home bp meds.) (08/06/17 1149)  SpO2: 100 % (08/06/17 1149) Vital Signs (24h Range):  Temp:  [98 °F (36.7 °C)-98.1 °F (36.7 °C)] 98.1 °F (36.7 °C)  Pulse:  [81-85] 83  Resp:  [18] 18  SpO2:  [98 %-100 %] 100 %  BP: (129-188)/() 182/116     Weight: 59 kg (130 lb)  Body mass index is 21.63 kg/m².    Estimated Creatinine Clearance: 7 mL/min (based on Cr of 11).    Physical Exam   Constitutional: She is oriented to person, place, and time. She appears well-developed and well-nourished. No distress.   HENT:   Head: Atraumatic.   Mouth/Throat: Oropharynx is clear and moist. No oropharyngeal exudate.   Eyes: Conjunctivae and EOM are normal. Pupils are equal, round, and reactive to light. No scleral icterus.   Neck: Neck supple.   Cardiovascular: Normal rate and regular rhythm.  Exam reveals no friction rub.    Murmur heard.  Pulmonary/Chest: Breath sounds normal. No respiratory distress. She has no wheezes. She has no rales. She exhibits no tenderness.   Abdominal: Soft. Bowel sounds are normal. She exhibits no distension. There is no tenderness. There is no rebound and no guarding.   Musculoskeletal: Normal range of motion. She exhibits no edema.   Lymphadenopathy:     She has no cervical adenopathy.   Neurological: She is alert and oriented to person, place, and time. No cranial nerve  deficit. She exhibits normal muscle tone. Coordination normal.   Skin: No rash noted. No erythema.   AVF w/o erythema/drainage.       Significant Labs:   CBC:   Recent Labs  Lab 08/05/17 0203 08/06/17  0930   WBC 5.51 3.96   HGB 9.5* 9.6*   HCT 29.1* 28.6*   PLT 65* 72*     CMP:   Recent Labs  Lab 08/05/17 0203 08/06/17  0930   * 137   K 4.8 4.7    107   CO2 17* 16*    94   BUN 57* 60*   CREATININE 10.3* 11.0*   CALCIUM 9.3 8.8   PROT 7.3  --    ALBUMIN 3.1*  --    BILITOT 0.8  --    ALKPHOS 639*  --    AST 52*  --    ALT 45*  --    ANIONGAP 12 14   EGFRNONAA 4.6* 4.3*       Significant Imaging: I have reviewed all pertinent imaging results/findings within the past 24 hours.     CXR: clear    Microbiology:  8/5 blood cx: S.aureus x2  8/6 blood cx: negative x2

## 2017-08-06 NOTE — ED TRIAGE NOTES
Pt presents to the ED c/o an abnormal lab. Pt states a doctor called her and said that she had positive blood cultures and needed to go to the ED. Pt states she was d/c from the hospital for a virus yesterday.

## 2017-08-06 NOTE — SUBJECTIVE & OBJECTIVE
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    cinacalcet (SENSIPAR) 60 MG Tab Take 1 tablet (60 mg total) by mouth daily with breakfast.    cloNIDine (CATAPRES) 0.1 MG tablet Take 1 tablet (0.1 mg total) by mouth 2 (two) times daily.    labetalol (NORMODYNE) 200 MG tablet Take 1 tablet (200 mg total) by mouth 2 (two) times daily.    predniSONE (DELTASONE) 1 MG tablet Take 1 mg by mouth every other day.    tacrolimus (PROGRAF) 1 MG Cap Take 8 capsules (8 mg total) by mouth every 12 (twelve) hours.    triamcinolone acetonide 0.1% (KENALOG) 0.1 % ointment AAA on arms, legs, and neck bid x 1-2 wks then prn flares only (Patient taking differently: Apply to affected area(s) on arms, legs, and neck twice daily x 1-2 wks then as needed for flares only)     Antibiotics     None        Antifungals     None        Antivirals     None           Immunization History   Administered Date(s) Administered    PPD Test 2015, 10/02/2015    influenza - Quadrivalent - PF (ADULT) 10/12/2015       Family History     Problem Relation (Age of Onset)    Hypertension Mother, Father        Social History     Social History    Marital status:      Spouse name: N/A    Number of children: N/A    Years  of education: N/A     Social History Main Topics    Smoking status: Never Smoker    Smokeless tobacco: Never Used    Alcohol use No    Drug use: No    Sexual activity: Yes     Partners: Male     Other Topics Concern    Are You Pregnant Or Think You May Be? No    Breast-Feeding No     Social History Narrative    ** Merged History Encounter **          Review of Systems   Constitutional: Negative for activity change, appetite change, chills, diaphoresis and fever.   HENT: Negative for ear pain, mouth sores, sinus pressure and sore throat.    Eyes: Negative for photophobia, pain and redness.   Respiratory: Negative for cough, shortness of breath and wheezing.    Cardiovascular: Negative for chest pain and leg swelling.   Gastrointestinal: Negative for abdominal distention, abdominal pain, diarrhea and nausea.   Genitourinary: Negative for dysuria, flank pain, frequency and urgency.   Musculoskeletal: Negative for arthralgias, back pain, gait problem and myalgias.   Skin: Negative for pallor and rash.   Neurological: Negative for dizziness, tremors, seizures and headaches.   Psychiatric/Behavioral: Negative for confusion.     Objective:     Vital Signs (Most Recent):  Temp: 98.1 °F (36.7 °C) (08/06/17 1149)  Pulse: 83 (08/06/17 1149)  Resp: 18 (08/06/17 1149)  BP: (!) 182/116 (md notified about restarting home bp meds.) (08/06/17 1149)  SpO2: 100 % (08/06/17 1149) Vital Signs (24h Range):  Temp:  [98 °F (36.7 °C)-98.1 °F (36.7 °C)] 98.1 °F (36.7 °C)  Pulse:  [81-85] 83  Resp:  [18] 18  SpO2:  [98 %-100 %] 100 %  BP: (129-188)/() 182/116     Weight: 59 kg (130 lb)  Body mass index is 21.63 kg/m².    Estimated Creatinine Clearance: 7 mL/min (based on Cr of 11).    Physical Exam   Constitutional: She is oriented to person, place, and time. She appears well-developed and well-nourished. No distress.   HENT:   Head: Atraumatic.   Mouth/Throat: Oropharynx is clear and moist. No oropharyngeal exudate.   Eyes:  Conjunctivae and EOM are normal. Pupils are equal, round, and reactive to light. No scleral icterus.   Neck: Neck supple.   Cardiovascular: Normal rate and regular rhythm.  Exam reveals no friction rub.    Murmur heard.  Pulmonary/Chest: Breath sounds normal. No respiratory distress. She has no wheezes. She has no rales. She exhibits no tenderness.   Abdominal: Soft. Bowel sounds are normal. She exhibits no distension. There is no tenderness. There is no rebound and no guarding.   Musculoskeletal: Normal range of motion. She exhibits no edema.   Lymphadenopathy:     She has no cervical adenopathy.   Neurological: She is alert and oriented to person, place, and time. No cranial nerve deficit. She exhibits normal muscle tone. Coordination normal.   Skin: No rash noted. No erythema.   AVF w/o erythema/drainage.       Significant Labs:   CBC:   Recent Labs  Lab 08/05/17  0203 08/06/17  0930   WBC 5.51 3.96   HGB 9.5* 9.6*   HCT 29.1* 28.6*   PLT 65* 72*     CMP:   Recent Labs  Lab 08/05/17  0203 08/06/17  0930   * 137   K 4.8 4.7    107   CO2 17* 16*    94   BUN 57* 60*   CREATININE 10.3* 11.0*   CALCIUM 9.3 8.8   PROT 7.3  --    ALBUMIN 3.1*  --    BILITOT 0.8  --    ALKPHOS 639*  --    AST 52*  --    ALT 45*  --    ANIONGAP 12 14   EGFRNONAA 4.6* 4.3*       Significant Imaging: I have reviewed all pertinent imaging results/findings within the past 24 hours.     CXR: clear    Microbiology:  8/5 blood cx: S.aureus x2  8/6 blood cx: negative x2

## 2017-08-06 NOTE — ED NOTES
Patient identifiers verified and correct for Holly Patel.    LOC: The patient is awake, alert and aware of environment with an appropriate affect, the patient is oriented x 3 and speaking appropriately.  APPEARANCE: Patient resting comfortably and in no acute distress, patient is clean and well groomed, patient's clothing is properly fastened.  SKIN: The skin is warm and dry, color consistent with ethnicity, patient has normal skin turgor and moist mucus membranes, skin intact, no breakdown or bruising noted.  MUSCULOSKELETAL: Patient moving all extremities spontaneously, no obvious swelling or deformities noted.  RESPIRATORY: Airway is open and patent, respirations are spontaneous, patient has a normal effort and rate, no accessory muscle use noted, bilateral breath sounds CTA.  CARDIAC: Patient has a rate of 85 and a regular rhythm, no peripheral edema noted, capillary refill < 3 seconds.  ABDOMEN: Soft and non tender to palpation, no distention noted, normoactive bowel sounds present in all four quadrants.  NEUROLOGIC: Eyes open spontaneously, behavior appropriate to situation, follows commands, facial expression symmetrical, bilateral hand grasp equal and even, purposeful motor response noted, normal sensation in all extremities when touched with a finger.

## 2017-08-06 NOTE — ASSESSMENT & PLAN NOTE
Bacteremia 4/4/ blood cultures  -U/S ordered to evaluate L arm fistula for thrombosis, but unlikely given thrill palpable and appears patent; JEFFREY to evaluate cardiac vegetations given staph bacteremia in the presence of a murmur  -Renally dosing vanc, CrCl 4.5; given 750mg today, will hold and get random vanc level tomorrow AM

## 2017-08-06 NOTE — HPI
Holly Patel is a 26 y.o. F with HTN, hemangioendothelioma s/p LTx (1992), ESRD on home hemodialysis (still makes urine), and secondary PTH who presented to the ED on 08/04/2017 c/o fever x1 day. She was worked up for infectious cause, but was pan negative on workup and it was decided that etiology was likely viral and she was sent home. Later on after discharge, blood cultures returned 4/4 positive for gram positive rods and the patient was contacted and told to come back to the ED. She states that she felt better at that time and would report to the hospital in the morning. On readmission, she was started on vancomycin and then transferred to the floor. Denies recent travel. She has not had similar symptoms, no prior h/o UTI. She denies any n/v/d, cough, sinus congestion, change in appetite, dysuria, urgency in last few or any sick contacts.

## 2017-08-06 NOTE — ED PROVIDER NOTES
Encounter Date: 2017    SCRIBE #1 NOTE: I, Crystal Langley, am scribing for, and in the presence of, Dr. Blanchard .       History     Chief Complaint   Patient presents with    Abnormal Lab     positive blood cultures     This is a 26 y.o. female with a PMHx of liver transplant, ESRD, HTN and anemia requiring transfusions who presents to the ED for positive blood cultures. Patient was recently admitted on  for febrile illness. She states that she is feeling well at the current time. Denies fever, headache, chest pain, neck stiffness, cough, sore throat, difficulty swallowing, dysuria. Patient's last dialysis was yesterday. No other complaints at the current time.       The history is provided by the patient and medical records.     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   HENT: Negative for sore throat and trouble swallowing.    Respiratory: Negative for cough.    Cardiovascular: Negative for chest pain.   Gastrointestinal: Negative for abdominal pain.   Genitourinary: Negative for dysuria.   Musculoskeletal: Negative for neck  stiffness.   Skin: Negative for rash.   Neurological: Negative for headaches.   Psychiatric/Behavioral: Negative for confusion.       Physical Exam     Initial Vitals [08/06/17 0901]   BP Pulse Resp Temp SpO2   (!) 188/112 81 18 98 °F (36.7 °C) 98 %      MAP       137.33         Physical Exam    Nursing note and vitals reviewed.  Constitutional: She appears well-developed and well-nourished. She is not diaphoretic. No distress.   HENT:   Head: Normocephalic and atraumatic.   Mouth/Throat: Oropharynx is clear and moist.   Moist mucous membranes.    Eyes: Conjunctivae are normal. No scleral icterus.   No conjunctiva pallor.    Neck: Normal range of motion. Neck supple. No JVD present.   Cardiovascular: Normal rate, regular rhythm, normal heart sounds and intact distal pulses.   Pulmonary/Chest: Breath sounds normal. No respiratory distress. She has no wheezes. She has no rhonchi. She has no rales.   Abdominal: Soft. She exhibits no distension. There is no tenderness.   Musculoskeletal: Normal range of motion. She exhibits no edema.   No leg edema.    Lymphadenopathy:     She has no cervical adenopathy.   Neurological: She is alert and oriented to person, place, and time. She has normal strength. No cranial nerve deficit or sensory deficit.   Skin: Skin is warm and dry.   Positive thrill over AV fistula on left arm.          ED Course   Procedures  Labs Reviewed   CULTURE, BLOOD   CULTURE, BLOOD   CBC W/ AUTO DIFFERENTIAL   BASIC METABOLIC PANEL             Medical Decision Making:   History:   Old Medical Records: I decided to obtain old medical records.  Old Records Summarized: records from clinic visits.       <> Summary of Records: She was last admitted on 8/5 for febrile illness. Hx of liver transplant in 1992, ESRD, on dialysis. Returning to the ED today for positive blood cultures.  Initial Assessment:   Emergent evaluation of 26 y.o. female, history of liver transplant, presenting with positive blood cultures.  Feels well. Denies fever, cough, headache, dysuria. Will draw blood cultures from two sites, start IV vancomycin, consult ID, admit for further treatment.   Clinical Tests:   Lab Tests: Ordered and Reviewed  Other:   I have discussed this case with another health care provider.            Scribe Attestation:   Scribe #1: I performed the above scribed service and the documentation accurately describes the services I performed. I attest to the accuracy of the note.    Attending Attestation:           Physician Attestation for Scribe:  Physician Attestation Statement for Scribe #1: I, Dr. Blanchard, reviewed documentation, as scribed by Crystal Langley in my presence, and it is both accurate and complete.                 ED Course     Clinical Impression:   The encounter diagnosis was Bacteremia.    Disposition:   Disposition: Admitted                        Eleanor Blanchard MD  08/06/17 0942

## 2017-08-06 NOTE — ED NOTES
Attempted to call report. EDMOND Solis, stated he was busy at the moment. Name and call back number given.

## 2017-08-06 NOTE — TELEPHONE ENCOUNTER
discharged today, micro called night float with 4/4 GPC resembling bjorn from 2 sites, confirmed with micro lab ( [phone])     Called patient at [phone] and she denied any fever, chills or any changes since discharge.    Asked patient to present to our ER, she stated she will be in the ER early AM.     She need to be admit back to hospital medicine for blood Cx X2 from 2 different sites, start IV vanc, ID consult and source control.

## 2017-08-06 NOTE — ASSESSMENT & PLAN NOTE
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 She is stable on empiric vanc/CTX.    - would continue vancomycin with HD til sensitivities are known   - would obtain TTE given S.aureus septicemia  - would obtain ultrasound of dialysis access to assess for abscess  - await pending repeat cultures  - additional source workup pending results of the above and duration of septicemia  - of note, patient will require parenteral antibiotics and dosing unlikely to be appropriate on home dialysis -- as such, she will require TIW HD on discharge while on this antibiotics course

## 2017-08-06 NOTE — ASSESSMENT & PLAN NOTE
Transplanted in 1992 for hemangioendothelioma; patient is on prograf & prednisone at home  -Prednisone every other day while in hospital; next dose 08/07/2017  -Prograf held in light of infection

## 2017-08-06 NOTE — ASSESSMENT & PLAN NOTE
Asymptomatic and no overt signs of end-organ damage at this time  - continue home BP meds amlodipine, clonidine and labetalol.

## 2017-08-06 NOTE — PROGRESS NOTES
Pt arrived to unit  AA+O times 4. Follows commands. Rates pain as tolerable. Denies nausea. Denies numbness and tingling. Oriented to room. Call bell in reach. Advised pt and family to call with any concerns. Will continue to monitor.

## 2017-08-06 NOTE — SUBJECTIVE & OBJECTIVE
Past Medical History:   Diagnosis Date    Allergy     Anemia requiring transfusions 2015    ESRD (end stage renal disease) 2015    Hypertension     Kidney disease     Liver transplanted     Malignant hypertension with chronic kidney disease stage IV 2014    Renal disorder        Past Surgical History:   Procedure Laterality Date     SECTION      2     LIVER BIOPSY      LIVER TRANSPLANT  1992    TUBAL LIGATION         Review of patient's allergies indicates:   Allergen Reactions    Chloral hydrate      Other reaction(s): Hallucinations  Other reaction(s): Hives    Hydrocodone        Current Facility-Administered Medications on File Prior to Encounter   Medication    [DISCONTINUED] amlodipine tablet 10 mg    [DISCONTINUED] cinacalcet tablet 60 mg    [DISCONTINUED] cloNIDine tablet 0.1 mg    [DISCONTINUED] cloNIDine tablet 0.1 mg    [DISCONTINUED] heparin (porcine) injection 5,000 Units    [DISCONTINUED] hydrALAZINE tablet 50 mg    [DISCONTINUED] labetalol tablet 200 mg    [DISCONTINUED] ondansetron disintegrating tablet 8 mg    [DISCONTINUED] predniSONE tablet 1 mg    [DISCONTINUED] senna-docusate 8.6-50 mg per tablet 1 tablet    [DISCONTINUED] sodium chloride 0.9% flush 3 mL    [DISCONTINUED] tacrolimus capsule 8 mg     Current Outpatient Prescriptions on File Prior to Encounter   Medication Sig    amlodipine (NORVASC) 10 MG tablet Take 1 tablet (10 mg total) by mouth once daily.    cinacalcet (SENSIPAR) 60 MG Tab Take 1 tablet (60 mg total) by mouth daily with breakfast.    cloNIDine (CATAPRES) 0.1 MG tablet Take 1 tablet (0.1 mg total) by mouth 2 (two) times daily.    labetalol (NORMODYNE) 200 MG tablet Take 1 tablet (200 mg total) by mouth 2 (two) times daily.    predniSONE (DELTASONE) 1 MG tablet Take 1 mg by mouth every other day.    tacrolimus (PROGRAF) 1 MG Cap Take 8 capsules (8 mg total) by mouth every 12 (twelve) hours.    triamcinolone acetonide  0.1% (KENALOG) 0.1 % ointment AAA on arms, legs, and neck bid x 1-2 wks then prn flares only (Patient taking differently: Apply to affected area(s) on arms, legs, and neck twice daily x 1-2 wks then as needed for flares only)     Family History     Problem Relation (Age of Onset)    Hypertension Mother, Father        Social History Main Topics    Smoking status: Never Smoker    Smokeless tobacco: Never Used    Alcohol use No    Drug use: No    Sexual activity: Yes     Partners: Male     Review of Systems   Constitutional: Positive for chills and fever. Negative for appetite change, fatigue and unexpected weight change.        Generalized malaise   HENT: Negative for congestion, ear pain, sore throat and trouble swallowing.    Eyes: Negative for visual disturbance.   Respiratory: Negative for cough and shortness of breath.    Cardiovascular: Negative for chest pain and leg swelling.   Gastrointestinal: Negative for abdominal distention, abdominal pain, constipation, diarrhea, nausea and vomiting.   Genitourinary: Negative for difficulty urinating, dysuria and hematuria.   Musculoskeletal: Negative for myalgias.   Skin: Negative for rash.   Neurological: Negative for dizziness, weakness, light-headedness and headaches.     Objective:     Vital Signs (Most Recent):  Temp: 98.1 °F (36.7 °C) (08/06/17 1149)  Pulse: 83 (08/06/17 1149)  Resp: 18 (08/06/17 1149)  BP: (!) 182/116 (md notified about restarting home bp meds.) (08/06/17 1149)  SpO2: 100 % (08/06/17 1149) Vital Signs (24h Range):  Temp:  [98 °F (36.7 °C)-98.1 °F (36.7 °C)] 98.1 °F (36.7 °C)  Pulse:  [81-85] 83  Resp:  [18] 18  SpO2:  [98 %-100 %] 100 %  BP: (129-188)/() 182/116     Weight: 59 kg (130 lb)  Body mass index is 21.63 kg/m².    Physical Exam   Constitutional: She is oriented to person, place, and time. She appears well-developed and well-nourished.   HENT:   Head: Normocephalic and atraumatic.   Eyes: EOM are normal. Pupils are equal,  round, and reactive to light.   Neck: No JVD present.   Cardiovascular: Normal rate, regular rhythm and intact distal pulses.    Murmur heard.   Systolic murmur is present with a grade of 2/6   Pulmonary/Chest: Effort normal and breath sounds normal. No respiratory distress.   Abdominal: Soft. Bowel sounds are normal. She exhibits no distension. There is no tenderness.   Musculoskeletal: She exhibits no edema.   AV fistula on left arm - has good thrill. No erythema, inflammation, TTP around site   Neurological: She is alert and oriented to person, place, and time.   Skin: Skin is warm and dry. Capillary refill takes less than 2 seconds.   Psychiatric: She has a normal mood and affect. Her behavior is normal. Judgment and thought content normal.   Vitals reviewed.     Significant Labs:   Recent Results (from the past 24 hour(s))   CBC auto differential    Collection Time: 08/06/17  9:30 AM   Result Value Ref Range    WBC 3.96 3.90 - 12.70 K/uL    RBC 3.53 (L) 4.00 - 5.40 M/uL    Hemoglobin 9.6 (L) 12.0 - 16.0 g/dL    Hematocrit 28.6 (L) 37.0 - 48.5 %    MCV 81 (L) 82 - 98 fL    MCH 27.2 27.0 - 31.0 pg    MCHC 33.6 32.0 - 36.0 g/dL    RDW 14.8 (H) 11.5 - 14.5 %    Platelets 72 (L) 150 - 350 K/uL    MPV SEE COMMENT 9.2 - 12.9 fL    Gran # 3.0 1.8 - 7.7 K/uL    Lymph # 0.4 (L) 1.0 - 4.8 K/uL    Mono # 0.4 0.3 - 1.0 K/uL    Eos # 0.1 0.0 - 0.5 K/uL    Baso # 0.01 0.00 - 0.20 K/uL    Gran% 76.5 (H) 38.0 - 73.0 %    Lymph% 11.1 (L) 18.0 - 48.0 %    Mono% 9.8 4.0 - 15.0 %    Eosinophil% 2.3 0.0 - 8.0 %    Basophil% 0.3 0.0 - 1.9 %    Differential Method Automated    Basic metabolic panel    Collection Time: 08/06/17  9:30 AM   Result Value Ref Range    Sodium 137 136 - 145 mmol/L    Potassium 4.7 3.5 - 5.1 mmol/L    Chloride 107 95 - 110 mmol/L    CO2 16 (L) 23 - 29 mmol/L    Glucose 94 70 - 110 mg/dL    BUN, Bld 60 (H) 6 - 20 mg/dL    Creatinine 11.0 (H) 0.5 - 1.4 mg/dL    Calcium 8.8 8.7 - 10.5 mg/dL    Anion Gap 14 8 -  16 mmol/L    eGFR if African American 4.9 (A) >60 mL/min/1.73 m^2    eGFR if non  4.3 (A) >60 mL/min/1.73 m^2     Significant Imaging:   X-Ray PA/L 08/05/2017:  No acute intrathoracic abnormality.

## 2017-08-06 NOTE — PROGRESS NOTES
Notified Dm on call for 1m4 of patients arrival and need for further orders and resumption of home meds.

## 2017-08-06 NOTE — ASSESSMENT & PLAN NOTE
Home HD schedule is Sun, Mon, Tue, Thu & Sat  -Will need to be adjusted to 3x weekly while patient is receiving parenteral antibiotics per ID

## 2017-08-06 NOTE — H&P
[facility] Hospital Medicine  History & Physical    Patient Name: Holly Patel  MRN: [id number]  Admission Date: 2017  Attending Physician: Brooklynn Kellogg MD   Primary Care Provider: [facility] Medicine Team: [facility] 4 David Lopez MD     Patient information was obtained from patient and ER records.     Subjective:     Principal Problem:Staphylococcus aureus septicemia    Chief Complaint:   Chief Complaint   Patient presents with    Abnormal Lab     positive blood cultures        HPI: Holly Patel is a 26 y.o. F with HTN, hemangioendothelioma s/p LTx (), ESRD on home hemodialysis (still makes urine), and secondary PTH who presented to the ED on 2017 c/o fever x1 day. She was worked up for infectious cause, but was pan negative on workup and it was decided that etiology was likely viral and she was sent home. Later on after discharge, blood cultures returned 4/4 positive for gram positive rods and the patient was contacted and told to come back to the ED. She states that she felt better at that time and would report to the hospital in the morning. On readmission, she was started on vancomycin and then transferred to the floor. Denies recent travel. She has not had similar symptoms, no prior h/o UTI. She denies any n/v/d, cough, sinus congestion, change in appetite, dysuria, urgency in last few or any sick contacts.    Past Medical History:   Diagnosis Date    Allergy     Anemia requiring transfusions 2015    ESRD (end stage renal disease) 2015    Hypertension     Kidney disease     Liver transplanted     Malignant hypertension with chronic kidney disease stage IV 2014    Renal disorder        Past Surgical History:   Procedure Laterality Date     SECTION      2     LIVER BIOPSY      LIVER TRANSPLANT  1992    TUBAL LIGATION         Review of patient's allergies indicates:    Allergen Reactions    Chloral hydrate      Other reaction(s): Hallucinations  Other reaction(s): Hives    Hydrocodone        Current Facility-Administered Medications on File Prior to Encounter   Medication    [DISCONTINUED] amlodipine tablet 10 mg    [DISCONTINUED] cinacalcet tablet 60 mg    [DISCONTINUED] cloNIDine tablet 0.1 mg    [DISCONTINUED] cloNIDine tablet 0.1 mg    [DISCONTINUED] heparin (porcine) injection 5,000 Units    [DISCONTINUED] hydrALAZINE tablet 50 mg    [DISCONTINUED] labetalol tablet 200 mg    [DISCONTINUED] ondansetron disintegrating tablet 8 mg    [DISCONTINUED] predniSONE tablet 1 mg    [DISCONTINUED] senna-docusate 8.6-50 mg per tablet 1 tablet    [DISCONTINUED] sodium chloride 0.9% flush 3 mL    [DISCONTINUED] tacrolimus capsule 8 mg     Current Outpatient Prescriptions on File Prior to Encounter   Medication Sig    amlodipine (NORVASC) 10 MG tablet Take 1 tablet (10 mg total) by mouth once daily.    cinacalcet (SENSIPAR) 60 MG Tab Take 1 tablet (60 mg total) by mouth daily with breakfast.    cloNIDine (CATAPRES) 0.1 MG tablet Take 1 tablet (0.1 mg total) by mouth 2 (two) times daily.    labetalol (NORMODYNE) 200 MG tablet Take 1 tablet (200 mg total) by mouth 2 (two) times daily.    predniSONE (DELTASONE) 1 MG tablet Take 1 mg by mouth every other day.    tacrolimus (PROGRAF) 1 MG Cap Take 8 capsules (8 mg total) by mouth every 12 (twelve) hours.    triamcinolone acetonide 0.1% (KENALOG) 0.1 % ointment AAA on arms, legs, and neck bid x 1-2 wks then prn flares only (Patient taking differently: Apply to affected area(s) on arms, legs, and neck twice daily x 1-2 wks then as needed for flares only)     Family History     Problem Relation (Age of Onset)    Hypertension Mother, Father        Social History Main Topics    Smoking status: Never Smoker    Smokeless tobacco: Never Used    Alcohol use No    Drug use: No    Sexual activity: Yes     Partners: Male      Review of Systems   Constitutional: Positive for chills and fever. Negative for appetite change, fatigue and unexpected weight change.        Generalized malaise   HENT: Negative for congestion, ear pain, sore throat and trouble swallowing.    Eyes: Negative for visual disturbance.   Respiratory: Negative for cough and shortness of breath.    Cardiovascular: Negative for chest pain and leg swelling.   Gastrointestinal: Negative for abdominal distention, abdominal pain, constipation, diarrhea, nausea and vomiting.   Genitourinary: Negative for difficulty urinating, dysuria and hematuria.   Musculoskeletal: Negative for myalgias.   Skin: Negative for rash.   Neurological: Negative for dizziness, weakness, light-headedness and headaches.     Objective:     Vital Signs (Most Recent):  Temp: 98.1 °F (36.7 °C) (08/06/17 1149)  Pulse: 83 (08/06/17 1149)  Resp: 18 (08/06/17 1149)  BP: (!) 182/116 (md notified about restarting home bp meds.) (08/06/17 1149)  SpO2: 100 % (08/06/17 1149) Vital Signs (24h Range):  Temp:  [98 °F (36.7 °C)-98.1 °F (36.7 °C)] 98.1 °F (36.7 °C)  Pulse:  [81-85] 83  Resp:  [18] 18  SpO2:  [98 %-100 %] 100 %  BP: (129-188)/() 182/116     Weight: 59 kg (130 lb)  Body mass index is 21.63 kg/m².    Physical Exam   Constitutional: She is oriented to person, place, and time. She appears well-developed and well-nourished.   HENT:   Head: Normocephalic and atraumatic.   Eyes: EOM are normal. Pupils are equal, round, and reactive to light.   Neck: No JVD present.   Cardiovascular: Normal rate, regular rhythm and intact distal pulses.    Murmur heard.   Systolic murmur is present with a grade of 2/6   Pulmonary/Chest: Effort normal and breath sounds normal. No respiratory distress.   Abdominal: Soft. Bowel sounds are normal. She exhibits no distension. There is no tenderness.   Musculoskeletal: She exhibits no edema.   AV fistula on left arm - has good thrill. No erythema, inflammation, TTP around  site   Neurological: She is alert and oriented to person, place, and time.   Skin: Skin is warm and dry. Capillary refill takes less than 2 seconds.   Psychiatric: She has a normal mood and affect. Her behavior is normal. Judgment and thought content normal.   Vitals reviewed.     Significant Labs:   Recent Results (from the past 24 hour(s))   CBC auto differential    Collection Time: 08/06/17  9:30 AM   Result Value Ref Range    WBC 3.96 3.90 - 12.70 K/uL    RBC 3.53 (L) 4.00 - 5.40 M/uL    Hemoglobin 9.6 (L) 12.0 - 16.0 g/dL    Hematocrit 28.6 (L) 37.0 - 48.5 %    MCV 81 (L) 82 - 98 fL    MCH 27.2 27.0 - 31.0 pg    MCHC 33.6 32.0 - 36.0 g/dL    RDW 14.8 (H) 11.5 - 14.5 %    Platelets 72 (L) 150 - 350 K/uL    MPV SEE COMMENT 9.2 - 12.9 fL    Gran # 3.0 1.8 - 7.7 K/uL    Lymph # 0.4 (L) 1.0 - 4.8 K/uL    Mono # 0.4 0.3 - 1.0 K/uL    Eos # 0.1 0.0 - 0.5 K/uL    Baso # 0.01 0.00 - 0.20 K/uL    Gran% 76.5 (H) 38.0 - 73.0 %    Lymph% 11.1 (L) 18.0 - 48.0 %    Mono% 9.8 4.0 - 15.0 %    Eosinophil% 2.3 0.0 - 8.0 %    Basophil% 0.3 0.0 - 1.9 %    Differential Method Automated    Basic metabolic panel    Collection Time: 08/06/17  9:30 AM   Result Value Ref Range    Sodium 137 136 - 145 mmol/L    Potassium 4.7 3.5 - 5.1 mmol/L    Chloride 107 95 - 110 mmol/L    CO2 16 (L) 23 - 29 mmol/L    Glucose 94 70 - 110 mg/dL    BUN, Bld 60 (H) 6 - 20 mg/dL    Creatinine 11.0 (H) 0.5 - 1.4 mg/dL    Calcium 8.8 8.7 - 10.5 mg/dL    Anion Gap 14 8 - 16 mmol/L    eGFR if African American 4.9 (A) >60 mL/min/1.73 m^2    eGFR if non  4.3 (A) >60 mL/min/1.73 m^2     Significant Imaging:   X-Ray PA/L 08/05/2017:  No acute intrathoracic abnormality.    Assessment/Plan:     * Staphylococcus aureus septicemia    Bacteremia 4/4/ blood cultures  -U/S ordered to evaluate L arm fistula for thrombosis, but unlikely given thrill palpable and appears patent; JEFFREY to evaluate cardiac vegetations given staph bacteremia in the presence of a  murmur  -Renally dosing vanc, CrCl 4.5; given 750mg today, will hold and get random vanc level tomorrow AM      ESRD (end stage renal disease)    Home HD schedule is Sun, Mon, Tue, Thu & Sat  -Will need to be adjusted to 3x weekly while patient is receiving parenteral antibiotics per ID      Hypertensive urgency, malignant    Asymptomatic and no overt signs of end-organ damage at this time  - continue home BP meds amlodipine, clonidine and labetalol.       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; patient is on prograf & prednisone at home  -Prednisone every other day while in hospital; next dose 08/07/2017  -Prograf held in light of infection      Anemia    Stable and at baseline in this ESRD patient on HD  -Hgb 9.6 today, will monitor for changes        VTE Risk Mitigation         Ordered     Medium Risk of VTE  Once      08/06/17 1122     Place LINDA hose  Until discontinued      08/06/17 1122        David Lopez MD  Department of Hospital Medicine   [facility]

## 2017-08-07 PROBLEM — B95.62 MRSA BACTEREMIA: Status: ACTIVE | Noted: 2017-08-06

## 2017-08-07 LAB
ALBUMIN SERPL BCP-MCNC: 2.6 G/DL
ALP SERPL-CCNC: 554 U/L
ALT SERPL W/O P-5'-P-CCNC: 39 U/L
ANION GAP SERPL CALC-SCNC: 12 MMOL/L
AST SERPL-CCNC: 43 U/L
BACTERIA BLD CULT: NORMAL
BASOPHILS # BLD AUTO: 0.01 K/UL
BASOPHILS NFR BLD: 0.3 %
BILIRUB SERPL-MCNC: 0.5 MG/DL
BUN SERPL-MCNC: 63 MG/DL
CALCIUM SERPL-MCNC: 7.6 MG/DL
CHLORIDE SERPL-SCNC: 109 MMOL/L
CO2 SERPL-SCNC: 15 MMOL/L
CREAT SERPL-MCNC: 11.3 MG/DL
DIFFERENTIAL METHOD: ABNORMAL
EOSINOPHIL # BLD AUTO: 0.4 K/UL
EOSINOPHIL NFR BLD: 9.9 %
ERYTHROCYTE [DISTWIDTH] IN BLOOD BY AUTOMATED COUNT: 15 %
EST. GFR  (AFRICAN AMERICAN): 4.8 ML/MIN/1.73 M^2
EST. GFR  (NON AFRICAN AMERICAN): 4.2 ML/MIN/1.73 M^2
GLUCOSE SERPL-MCNC: 92 MG/DL
HCT VFR BLD AUTO: 27.3 %
HGB BLD-MCNC: 8.9 G/DL
LYMPHOCYTES # BLD AUTO: 1 K/UL
LYMPHOCYTES NFR BLD: 25.6 %
MAGNESIUM SERPL-MCNC: 1.9 MG/DL
MCH RBC QN AUTO: 26.9 PG
MCHC RBC AUTO-ENTMCNC: 32.6 G/DL
MCV RBC AUTO: 83 FL
MONOCYTES # BLD AUTO: 0.4 K/UL
MONOCYTES NFR BLD: 11 %
NEUTROPHILS # BLD AUTO: 2 K/UL
NEUTROPHILS NFR BLD: 53.2 %
PHOSPHATE SERPL-MCNC: 5.1 MG/DL
PLATELET # BLD AUTO: 67 K/UL
PMV BLD AUTO: ABNORMAL FL
POTASSIUM SERPL-SCNC: 4.8 MMOL/L
PROT SERPL-MCNC: 6.3 G/DL
RBC # BLD AUTO: 3.31 M/UL
SODIUM SERPL-SCNC: 136 MMOL/L
VANCOMYCIN SERPL-MCNC: 12.6 UG/ML
WBC # BLD AUTO: 3.83 K/UL

## 2017-08-07 PROCEDURE — 99223 1ST HOSP IP/OBS HIGH 75: CPT | Mod: ,,, | Performed by: INTERNAL MEDICINE

## 2017-08-07 PROCEDURE — 83735 ASSAY OF MAGNESIUM: CPT

## 2017-08-07 PROCEDURE — 25000003 PHARM REV CODE 250: Performed by: STUDENT IN AN ORGANIZED HEALTH CARE EDUCATION/TRAINING PROGRAM

## 2017-08-07 PROCEDURE — 11000001 HC ACUTE MED/SURG PRIVATE ROOM

## 2017-08-07 PROCEDURE — 80100016 HC MAINTENANCE HEMODIALYSIS

## 2017-08-07 PROCEDURE — 63600175 PHARM REV CODE 636 W HCPCS: Performed by: STUDENT IN AN ORGANIZED HEALTH CARE EDUCATION/TRAINING PROGRAM

## 2017-08-07 PROCEDURE — 80202 ASSAY OF VANCOMYCIN: CPT

## 2017-08-07 PROCEDURE — 85025 COMPLETE CBC W/AUTO DIFF WBC: CPT

## 2017-08-07 PROCEDURE — 80053 COMPREHEN METABOLIC PANEL: CPT

## 2017-08-07 PROCEDURE — 36415 COLL VENOUS BLD VENIPUNCTURE: CPT

## 2017-08-07 PROCEDURE — 63600175 PHARM REV CODE 636 W HCPCS: Performed by: INTERNAL MEDICINE

## 2017-08-07 PROCEDURE — 84100 ASSAY OF PHOSPHORUS: CPT

## 2017-08-07 PROCEDURE — 99232 SBSQ HOSP IP/OBS MODERATE 35: CPT | Mod: GC,,, | Performed by: INTERNAL MEDICINE

## 2017-08-07 PROCEDURE — 87040 BLOOD CULTURE FOR BACTERIA: CPT

## 2017-08-07 PROCEDURE — 99233 SBSQ HOSP IP/OBS HIGH 50: CPT | Mod: ,,, | Performed by: INTERNAL MEDICINE

## 2017-08-07 RX ORDER — DIPHENHYDRAMINE HCL 25 MG
25 CAPSULE ORAL EVERY 6 HOURS PRN
Status: DISCONTINUED | OUTPATIENT
Start: 2017-08-07 | End: 2017-08-09 | Stop reason: HOSPADM

## 2017-08-07 RX ORDER — HEPARIN SODIUM 5000 [USP'U]/ML
5000 INJECTION, SOLUTION INTRAVENOUS; SUBCUTANEOUS DAILY
Status: DISCONTINUED | OUTPATIENT
Start: 2017-08-07 | End: 2017-08-09 | Stop reason: HOSPADM

## 2017-08-07 RX ORDER — SODIUM CHLORIDE 9 MG/ML
INJECTION, SOLUTION INTRAVENOUS ONCE
Status: DISCONTINUED | OUTPATIENT
Start: 2017-08-07 | End: 2017-08-09 | Stop reason: HOSPADM

## 2017-08-07 RX ADMIN — ERYTHROPOIETIN 10000 UNITS: 10000 INJECTION, SOLUTION INTRAVENOUS; SUBCUTANEOUS at 12:08

## 2017-08-07 RX ADMIN — VANCOMYCIN HYDROCHLORIDE 1000 MG: 1 INJECTION, POWDER, LYOPHILIZED, FOR SOLUTION INTRAVENOUS at 01:08

## 2017-08-07 RX ADMIN — CLONIDINE HYDROCHLORIDE 0.1 MG: 0.1 TABLET ORAL at 08:08

## 2017-08-07 RX ADMIN — LABETALOL HYDROCHLORIDE 200 MG: 200 TABLET, FILM COATED ORAL at 08:08

## 2017-08-07 RX ADMIN — PREDNISONE 1 MG: 1 TABLET ORAL at 01:08

## 2017-08-07 RX ADMIN — TACROLIMUS 8 MG: 1 CAPSULE, GELATIN COATED ORAL at 08:08

## 2017-08-07 RX ADMIN — DIPHENHYDRAMINE HYDROCHLORIDE 25 MG: 25 CAPSULE ORAL at 02:08

## 2017-08-07 RX ADMIN — CINACALCET HYDROCHLORIDE 60 MG: 30 TABLET, COATED ORAL at 01:08

## 2017-08-07 RX ADMIN — TACROLIMUS 8 MG: 5 CAPSULE ORAL at 05:08

## 2017-08-07 RX ADMIN — AMLODIPINE BESYLATE 10 MG: 10 TABLET ORAL at 01:08

## 2017-08-07 NOTE — ASSESSMENT & PLAN NOTE
Home HD schedule is Sun, Mon, Tue, Thu & Sat  -Will need to be adjusted to 3x weekly while patient is receiving parenteral antibiotics per ID  -Currently undergoing Hd, will monitor patient after

## 2017-08-07 NOTE — PLAN OF CARE
Problem: Patient Care Overview  Goal: Plan of Care Review  Hemodialysis is complete.  Blood returned.  AV Fistula to RFA was then de accessed and needles removed.  Hemostasis attained.  No bleed or hematoma.  Fistula has Bruit and Thrill.  HD time = 180 min.  UF = 2000ml.

## 2017-08-07 NOTE — ASSESSMENT & PLAN NOTE
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 She is stable on vancomycin with an echo suggestive of MV endocarditis (filament on MV; however, on atypical side of heart for infection).    - would continue vancomycin with HD  - can hold on JEFFREY for now as all 4 valves well seen on TTE per cardiology (no evidence of abscess)  - would obtain ultrasound of dialysis access to assess for abscess  - await pending repeat cultures to assess for clearance  - of note, patient will require parenteral antibiotics and will require TIW HD on discharge while on this antibiotics course (likely 6wks)

## 2017-08-07 NOTE — ASSESSMENT & PLAN NOTE
Normotensive after reinitiating of home meds  Asymptomatic and no overt signs of end-organ damage at this time  - continue home BP meds amlodipine, clonidine and labetalol.

## 2017-08-07 NOTE — PROGRESS NOTES
Pt arrived VIA Pt. Escort and was transferred to bed without incident.  AV Fistula to LFA was then prepped and cannulated using button hole blunts. as per protocol.  Both lines aspirate and flush without resistance or infiltration.  Patient denies any pain.  Maintenance dialysis was then initiated.

## 2017-08-07 NOTE — SUBJECTIVE & OBJECTIVE
Interval History: Feels well. Afebrile. MRSA in cx.    Review of Systems   Constitutional: Negative for activity change, appetite change, chills, diaphoresis and fever.   HENT: Negative for ear pain, mouth sores, sinus pressure and sore throat.    Eyes: Negative for photophobia, pain and redness.   Respiratory: Negative for cough, shortness of breath and wheezing.    Cardiovascular: Negative for chest pain and leg swelling.   Gastrointestinal: Negative for abdominal distention, abdominal pain, diarrhea and nausea.   Genitourinary: Negative for dysuria, flank pain, frequency and urgency.   Musculoskeletal: Negative for arthralgias, back pain, gait problem and myalgias.   Skin: Negative for pallor and rash.   Neurological: Negative for dizziness, tremors, seizures and headaches.   Psychiatric/Behavioral: Negative for confusion.     Objective:     Vital Signs (Most Recent):  Temp: 98.3 °F (36.8 °C) (08/07/17 1555)  Pulse: 95 (08/07/17 1555)  Resp: 18 (08/07/17 1555)  BP: (!) 147/73 (08/07/17 1555)  SpO2: 100 % (08/07/17 1555) Vital Signs (24h Range):  Temp:  [96.3 °F (35.7 °C)-98.3 °F (36.8 °C)] 98.3 °F (36.8 °C)  Pulse:  [67-95] 95  Resp:  [18] 18  SpO2:  [98 %-100 %] 100 %  BP: (135-156)/() 147/73     Weight: 59 kg (130 lb)  Body mass index is 21.63 kg/m².    Estimated Creatinine Clearance: 6.8 mL/min (based on Cr of 11.3).    Physical Exam   Constitutional: She is oriented to person, place, and time. She appears well-developed and well-nourished. No distress.   HENT:   Head: Atraumatic.   Mouth/Throat: Oropharynx is clear and moist. No oropharyngeal exudate.   Eyes: Conjunctivae and EOM are normal. Pupils are equal, round, and reactive to light. No scleral icterus.   Neck: Neck supple.   Cardiovascular: Normal rate and regular rhythm.  Exam reveals no friction rub.    Murmur heard.  Pulmonary/Chest: Breath sounds normal. No respiratory distress. She has no wheezes. She has no rales. She exhibits no tenderness.    Abdominal: Soft. Bowel sounds are normal. She exhibits no distension. There is no tenderness. There is no rebound and no guarding.   Musculoskeletal: Normal range of motion. She exhibits no edema.   Lymphadenopathy:     She has no cervical adenopathy.   Neurological: She is alert and oriented to person, place, and time. No cranial nerve deficit. She exhibits normal muscle tone. Coordination normal.   Skin: No rash noted. No erythema.   AVF w/o erythema/drainage.       Significant Labs:   CBC:   Recent Labs  Lab 08/06/17 0930 08/07/17  0455   WBC 3.96 3.83*   HGB 9.6* 8.9*   HCT 28.6* 27.3*   PLT 72* 67*     CMP:   Recent Labs  Lab 08/06/17 0930 08/07/17  0455    136   K 4.7 4.8    109   CO2 16* 15*   GLU 94 92   BUN 60* 63*   CREATININE 11.0* 11.3*   CALCIUM 8.8 7.6*   PROT  --  6.3   ALBUMIN  --  2.6*   BILITOT  --  0.5   ALKPHOS  --  554*   AST  --  43*   ALT  --  39   ANIONGAP 14 12   EGFRNONAA 4.3* 4.2*       Significant Imaging: I have reviewed all pertinent imaging results/findings within the past 24 hours.     CXR: clear    2D echo: (d/w staff cardiologist)    1 - Moderate left atrial enlargement.     2 - Eccentric hypertrophy.     3 - Normal left ventricular systolic function (EF 60-65%).     4 - Normal left ventricular diastolic function.     5 - Normal right ventricular systolic function .     6 - Mild mitral regurgitation.     7 - Filamentous structure attached to mitral valve (see details above).      Microbiology:  8/5 blood cx: MRSA x2  8/6 blood cx: negative x2

## 2017-08-07 NOTE — PLAN OF CARE
"CM to bedside - pt provided assessment info. Pt is independent w/ only home HD equipment in place, lives w/ mother. Pt's home HD supplies are supplied by [facility] - pt dialyzes 5 days per week. Pt will likely d/c home w/ no needs.     CM provided patient anticipated ELGIN which will be update by nursing staff. Patient provided a Blue "My Health Packet" for d/c planning and health tool. Patient verbalized understanding.    Future Appointments  Date Time Provider Department Center   8/8/2017 9:00 AM INPATIENT, JEFFREY [facility] E[facility]OL[facility]   8/30/2017 1:00 PM Viktor Bass MD [facility] NEPHRO [place]   9/13/2017 1:20 PM Viktor Bass MD [facility] NEPHRO [place]   10/16/2017 3:00 PM Lei Pinedo MD [facility] L[facility]ERT[facility]        08/07/17 1449   Discharge Assessment   Assessment Type Discharge Planning Assessment   Confirmed/corrected address and phone number on facesheet? Yes   Assessment information obtained from? Patient   Expected Length of Stay (days) 3   Communicated expected length of stay with patient/caregiver yes   Prior to hospitilization cognitive status: Alert/Oriented   Prior to hospitalization functional status: Independent   Current cognitive status: Alert/Oriented   Current Functional Status: Independent   Arrived From home or self-care   Lives With parent(s)   Able to Return to Prior Arrangements yes   Is patient able to care for self after discharge? Yes   Does the patient have family/friends to help with healtcare needs after discharge? yes   How many people do you have in your home that can help with your care after discharge? 1   Who are your caregiver(s) and their phone number(s)? mother - Urich [phone]   Patient's perception of discharge disposition home or selfcare   Readmission Within The Last 30 Days current reason for admission unrelated to previous admission   If YES, was patient admitted for the same reason? No   If yes, most recent facility name: [facility]   What reason does the patient think " brought them back to the hospital? abnormal labwork   Patient currently being followed by outpatient case management? No   Patient currently receives home health services? No   Does the patient currently use HME? No   Patient currently receives private duty nursing? No   Patient currently receives any other outside agency services? No   Equipment Currently Used at Home other (see comments)  (home dialysis equipment)   Do you have any problems affording any of your prescribed medications? No   Is the patient taking medications as prescribed? yes   Do you have any financial concerns preventing you from receiving the healthcare you need? No   Does the patient have transportation to healthcare appointments? Yes   Transportation Available family or friend will provide   On Dialysis? Yes   If yes, what is the name of the dialysis unit? none; pt is on home dialysis - supplies are given by [facility]; pt does home HD 5 days per week   Does the patient receive outpatient dialysis? No   Does the patient receive services at the [facility]? No   Are there any open cases? No   Discharge Plan A Home with family   Discharge Plan B Home with family;Home Health   Patient/Family In Agreement With Plan yes

## 2017-08-07 NOTE — ASSESSMENT & PLAN NOTE
Bacteremia 4/4 blood cultures from 08/05/2017, sensitivities pending  [facility]   -U/S ordered to evaluate L arm fistula for thrombosis, but unlikely given thrill palpable and appears patent  -TTE to evaluate cardiac vegetations given staph bacteremia in the presence of a murmur, revealed mild MR. There is a tiny filamentous structure attached to the anterior mitral leaflet. It appears to have independant motion, but is on the ventricular side of the leaflet and therefore is not likely to be a vegetation.   -Renally dosing vanc, CrCl 4.5; given 750mg yesterday; random vanc level 12.6 today, will give additional 1g to be given today s/p HD

## 2017-08-07 NOTE — PROGRESS NOTES
[facility] Hospital Medicine  Progress Note    Patient Name: Holly Patel  MRN: [id number]  Patient Class: IP- Inpatient   Admission Date: 8/6/2017  Length of Stay: 1 days  Attending Physician: Brooklynn Kellogg MD  Primary Care Provider: [facility] Medicine Team: [facility] HOSP MED 4 David Lopez MD    Subjective:     Principal Problem:Staphylococcus aureus bacteremia    HPI:  Holly Patel is a 26 y.o. F with HTN, hemangioendothelioma s/p LTx (1992), ESRD on home hemodialysis (still makes urine), and secondary PTH who presented to the ED on 08/04/2017 c/o fever x1 day. She was worked up for infectious cause, but was pan negative on workup and it was decided that etiology was likely viral and she was sent home. Later on after discharge, blood cultures returned 4/4 positive for gram positive rods and the patient was contacted and told to come back to the ED. She states that she felt better at that time and would report to the hospital in the morning. On readmission, she was started on vancomycin and then transferred to the floor. Denies recent travel. She has not had similar symptoms, no prior h/o UTI. She denies any n/v/d, cough, sinus congestion, change in appetite, dysuria, urgency in last few or any sick contacts.    Hospital Course:  No notes on file    Interval History: NEAON. Aleena is no longer febrile, states that she feels back to baseline. Denies CP/SOB/abdominal pain, or any new complaints. Will continue to monitor and await susceptibility for staph bacteremia.     Review of Systems   Constitutional: Negative for appetite change, fatigue and unexpected weight change.        Generalized malaise   HENT: Negative for congestion, ear pain, sore throat and trouble swallowing.    Eyes: Negative for visual disturbance.   Respiratory: Negative for cough and shortness of breath.    Cardiovascular: Negative for chest pain and leg  swelling.   Gastrointestinal: Negative for abdominal distention, abdominal pain, constipation, diarrhea, nausea and vomiting.   Genitourinary: Negative for difficulty urinating, dysuria and hematuria.   Musculoskeletal: Negative for myalgias.   Skin: Negative for rash.   Neurological: Negative for dizziness, weakness, light-headedness and headaches.     Objective:     Vital Signs (Most Recent):  Temp: 96.3 °F (35.7 °C) (08/07/17 0800)  Pulse: 70 (08/07/17 0943)  Resp: 18 (08/07/17 0930)  BP: (!) 145/95 (08/07/17 0943)  SpO2: 100 % (08/07/17 0800) Vital Signs (24h Range):  Temp:  [96.3 °F (35.7 °C)-98.2 °F (36.8 °C)] 96.3 °F (35.7 °C)  Pulse:  [67-99] 70  Resp:  [18] 18  SpO2:  [98 %-100 %] 100 %  BP: (135-182)/() 145/95     Weight: 59 kg (130 lb)  Body mass index is 21.63 kg/m².    Intake/Output Summary (Last 24 hours) at 08/07/17 0946  Last data filed at 08/06/17 1300   Gross per 24 hour   Intake              490 ml   Output                0 ml   Net              490 ml      Physical Exam   Constitutional: She is oriented to person, place, and time. She appears well-developed and well-nourished.   HENT:   Head: Normocephalic and atraumatic.   Eyes: EOM are normal. Pupils are equal, round, and reactive to light.   Neck: No JVD present.   Cardiovascular: Normal rate, regular rhythm and intact distal pulses.    Murmur heard.   Systolic murmur is present with a grade of 2/6   Pulmonary/Chest: Effort normal and breath sounds normal. No respiratory distress.   Abdominal: Soft. Bowel sounds are normal. She exhibits no distension. There is no tenderness.   Musculoskeletal: She exhibits no edema.   AV fistula on left arm - has good thrill. No erythema, inflammation, TTP around site   Neurological: She is alert and oriented to person, place, and time.   Skin: Skin is warm and dry. Capillary refill takes less than 2 seconds.   Psychiatric: She has a normal mood and affect. Her behavior is normal. Judgment and thought  content normal.   Vitals reviewed.      Significant Labs:   Recent Results (from the past 24 hour(s))   Blood culture #2 **CANNOT BE ORDERED STAT**    Collection Time: 08/06/17  9:54 AM   Result Value Ref Range    Blood Culture, Routine No Growth to date    2D echo with color flow doppler    Collection Time: 08/06/17  5:36 PM   Result Value Ref Range    EF 60 55 - 65    Mitral Valve Regurgitation MILD     Diastolic Dysfunction No     Est. PA Systolic Pressure 24.9     Mitral Valve Mobility NORMAL    Vancomycin, random    Collection Time: 08/07/17  4:55 AM   Result Value Ref Range    Vancomycin, Random 12.6 Not established ug/mL   Comprehensive metabolic panel    Collection Time: 08/07/17  4:55 AM   Result Value Ref Range    Sodium 136 136 - 145 mmol/L    Potassium 4.8 3.5 - 5.1 mmol/L    Chloride 109 95 - 110 mmol/L    CO2 15 (L) 23 - 29 mmol/L    Glucose 92 70 - 110 mg/dL    BUN, Bld 63 (H) 6 - 20 mg/dL    Creatinine 11.3 (H) 0.5 - 1.4 mg/dL    Calcium 7.6 (L) 8.7 - 10.5 mg/dL    Total Protein 6.3 6.0 - 8.4 g/dL    Albumin 2.6 (L) 3.5 - 5.2 g/dL    Total Bilirubin 0.5 0.1 - 1.0 mg/dL    Alkaline Phosphatase 554 (H) 55 - 135 U/L    AST 43 (H) 10 - 40 U/L    ALT 39 10 - 44 U/L    Anion Gap 12 8 - 16 mmol/L    eGFR if African American 4.8 (A) >60 mL/min/1.73 m^2    eGFR if non  4.2 (A) >60 mL/min/1.73 m^2   CBC auto differential    Collection Time: 08/07/17  4:55 AM   Result Value Ref Range    WBC 3.83 (L) 3.90 - 12.70 K/uL    RBC 3.31 (L) 4.00 - 5.40 M/uL    Hemoglobin 8.9 (L) 12.0 - 16.0 g/dL    Hematocrit 27.3 (L) 37.0 - 48.5 %    MCV 83 82 - 98 fL    MCH 26.9 (L) 27.0 - 31.0 pg    MCHC 32.6 32.0 - 36.0 g/dL    RDW 15.0 (H) 11.5 - 14.5 %    Platelets 67 (L) 150 - 350 K/uL    MPV SEE COMMENT 9.2 - 12.9 fL    Gran # 2.0 1.8 - 7.7 K/uL    Lymph # 1.0 1.0 - 4.8 K/uL    Mono # 0.4 0.3 - 1.0 K/uL    Eos # 0.4 0.0 - 0.5 K/uL    Baso # 0.01 0.00 - 0.20 K/uL    Gran% 53.2 38.0 - 73.0 %    Lymph% 25.6 18.0 -  48.0 %    Mono% 11.0 4.0 - 15.0 %    Eosinophil% 9.9 (H) 0.0 - 8.0 %    Basophil% 0.3 0.0 - 1.9 %    Differential Method Automated    Magnesium    Collection Time: 17  4:55 AM   Result Value Ref Range    Magnesium 1.9 1.6 - 2.6 mg/dL   Phosphorus    Collection Time: 17  4:55 AM   Result Value Ref Range    Phosphorus 5.1 (H) 2.7 - 4.5 mg/dL     Significant ImaginD echo 2017:  CONCLUSIONS     1 - Moderate left atrial enlargement.     2 - Eccentric hypertrophy.     3 - Normal left ventricular systolic function (EF 60-65%).     4 - Normal left ventricular diastolic function.     5 - Normal right ventricular systolic function .     6 - Mild mitral regurgitation.     7 - Filamentous structure attached to mitral valve (see details above).     Assessment/Plan:      * Staphylococcus aureus bacteremia    Bacteremia  blood cultures from 2017, sensitivities pending  Blood cultures from [facility] 1 day,   -U/S ordered to evaluate L arm fistula for thrombosis, but unlikely given thrill palpable and appears patent  -TTE to evaluate cardiac vegetations given staph bacteremia in the presence of a murmur, revealed mild MR. There is a tiny filamentous structure attached to the anterior mitral leaflet. It appears to have independant motion, but is on the ventricular side of the leaflet and therefore is not likely to be a vegetation.   -Renally dosing vanc, CrCl 4.5; given 750mg yesterday; random vanc level 12.6 today, will give additional 1g to be given today s/p HD      ESRD on hemodialysis    Home HD schedule is Sun, Mon, Tue, u & Sat  -Will need to be adjusted to 3x weekly while patient is receiving parenteral antibiotics per ID  -Currently undergoing Hd, will monitor patient after      Hypertensive urgency, malignant    Normotensive after reinitiating of home meds  Asymptomatic and no overt signs of end-organ damage at this time  - continue home BP meds amlodipine, clonidine and labetalol.        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; patient is on prograf & prednisone at home  -Prednisone every other day while in hospital; next dose 08/07/2017  -Prograf held in light of infection      Anemia in ESRD (end-stage renal disease)    Stable and at baseline in this ESRD patient on HD  -Hgb 8.9 today, will monitor for changes        VTE Risk Mitigation         Ordered     heparin (porcine) injection 5,000 Units  Daily     Route:  Subcutaneous        08/07/17 0718     Medium Risk of VTE  Once      08/06/17 1122     Place LINDA hose  Until discontinued      08/06/17 1122        David Lopez MD  Department of Hospital Medicine   [facility]

## 2017-08-07 NOTE — PLAN OF CARE
Problem: Patient Care Overview  Goal: Plan of Care Review  Outcome: Ongoing (interventions implemented as appropriate)  Pt plan of care discussed with pt. No questions or concerns at this time. No distress or c/o pain this shift. Remains afebrile this shift. Bed low, call light in reach. Will cont to monitor.

## 2017-08-07 NOTE — PLAN OF CARE
Blood:  + MRSA.  Please place on contact isolation with appropriate use of PPE and hand hygiene.  Contact Infection Control @ [phone] for guidelines on discontinuation of isolation.  Spoke with Nina about + MRSA and new order for contact isolation.

## 2017-08-07 NOTE — SUBJECTIVE & OBJECTIVE
Interval History: NEAODIMPLEYo Flood is no longer febrile, states that she feels back to baseline. Denies CP/SOB/abdominal pain, or any new complaints. Will continue to monitor and await susceptibility for staph bacteremia.     Review of Systems   Constitutional: Negative for appetite change, fatigue and unexpected weight change.        Generalized malaise   HENT: Negative for congestion, ear pain, sore throat and trouble swallowing.    Eyes: Negative for visual disturbance.   Respiratory: Negative for cough and shortness of breath.    Cardiovascular: Negative for chest pain and leg swelling.   Gastrointestinal: Negative for abdominal distention, abdominal pain, constipation, diarrhea, nausea and vomiting.   Genitourinary: Negative for difficulty urinating, dysuria and hematuria.   Musculoskeletal: Negative for myalgias.   Skin: Negative for rash.   Neurological: Negative for dizziness, weakness, light-headedness and headaches.     Objective:     Vital Signs (Most Recent):  Temp: 96.3 °F (35.7 °C) (08/07/17 0800)  Pulse: 70 (08/07/17 0943)  Resp: 18 (08/07/17 0930)  BP: (!) 145/95 (08/07/17 0943)  SpO2: 100 % (08/07/17 0800) Vital Signs (24h Range):  Temp:  [96.3 °F (35.7 °C)-98.2 °F (36.8 °C)] 96.3 °F (35.7 °C)  Pulse:  [67-99] 70  Resp:  [18] 18  SpO2:  [98 %-100 %] 100 %  BP: (135-182)/() 145/95     Weight: 59 kg (130 lb)  Body mass index is 21.63 kg/m².    Intake/Output Summary (Last 24 hours) at 08/07/17 0946  Last data filed at 08/06/17 1300   Gross per 24 hour   Intake              490 ml   Output                0 ml   Net              490 ml      Physical Exam   Constitutional: She is oriented to person, place, and time. She appears well-developed and well-nourished.   HENT:   Head: Normocephalic and atraumatic.   Eyes: EOM are normal. Pupils are equal, round, and reactive to light.   Neck: No JVD present.   Cardiovascular: Normal rate, regular rhythm and intact distal pulses.    Murmur heard.   Systolic  murmur is present with a grade of 2/6   Pulmonary/Chest: Effort normal and breath sounds normal. No respiratory distress.   Abdominal: Soft. Bowel sounds are normal. She exhibits no distension. There is no tenderness.   Musculoskeletal: She exhibits no edema.   AV fistula on left arm - has good thrill. No erythema, inflammation, TTP around site   Neurological: She is alert and oriented to person, place, and time.   Skin: Skin is warm and dry. Capillary refill takes less than 2 seconds.   Psychiatric: She has a normal mood and affect. Her behavior is normal. Judgment and thought content normal.   Vitals reviewed.      Significant Labs:   Recent Results (from the past 24 hour(s))   Blood culture #2 **CANNOT BE ORDERED STAT**    Collection Time: 08/06/17  9:54 AM   Result Value Ref Range    Blood Culture, Routine No Growth to date    2D echo with color flow doppler    Collection Time: 08/06/17  5:36 PM   Result Value Ref Range    EF 60 55 - 65    Mitral Valve Regurgitation MILD     Diastolic Dysfunction No     Est. PA Systolic Pressure 24.9     Mitral Valve Mobility NORMAL    Vancomycin, random    Collection Time: 08/07/17  4:55 AM   Result Value Ref Range    Vancomycin, Random 12.6 Not established ug/mL   Comprehensive metabolic panel    Collection Time: 08/07/17  4:55 AM   Result Value Ref Range    Sodium 136 136 - 145 mmol/L    Potassium 4.8 3.5 - 5.1 mmol/L    Chloride 109 95 - 110 mmol/L    CO2 15 (L) 23 - 29 mmol/L    Glucose 92 70 - 110 mg/dL    BUN, Bld 63 (H) 6 - 20 mg/dL    Creatinine 11.3 (H) 0.5 - 1.4 mg/dL    Calcium 7.6 (L) 8.7 - 10.5 mg/dL    Total Protein 6.3 6.0 - 8.4 g/dL    Albumin 2.6 (L) 3.5 - 5.2 g/dL    Total Bilirubin 0.5 0.1 - 1.0 mg/dL    Alkaline Phosphatase 554 (H) 55 - 135 U/L    AST 43 (H) 10 - 40 U/L    ALT 39 10 - 44 U/L    Anion Gap 12 8 - 16 mmol/L    eGFR if African American 4.8 (A) >60 mL/min/1.73 m^2    eGFR if non  4.2 (A) >60 mL/min/1.73 m^2   CBC auto differential     Collection Time: 17  4:55 AM   Result Value Ref Range    WBC 3.83 (L) 3.90 - 12.70 K/uL    RBC 3.31 (L) 4.00 - 5.40 M/uL    Hemoglobin 8.9 (L) 12.0 - 16.0 g/dL    Hematocrit 27.3 (L) 37.0 - 48.5 %    MCV 83 82 - 98 fL    MCH 26.9 (L) 27.0 - 31.0 pg    MCHC 32.6 32.0 - 36.0 g/dL    RDW 15.0 (H) 11.5 - 14.5 %    Platelets 67 (L) 150 - 350 K/uL    MPV SEE COMMENT 9.2 - 12.9 fL    Gran # 2.0 1.8 - 7.7 K/uL    Lymph # 1.0 1.0 - 4.8 K/uL    Mono # 0.4 0.3 - 1.0 K/uL    Eos # 0.4 0.0 - 0.5 K/uL    Baso # 0.01 0.00 - 0.20 K/uL    Gran% 53.2 38.0 - 73.0 %    Lymph% 25.6 18.0 - 48.0 %    Mono% 11.0 4.0 - 15.0 %    Eosinophil% 9.9 (H) 0.0 - 8.0 %    Basophil% 0.3 0.0 - 1.9 %    Differential Method Automated    Magnesium    Collection Time: 17  4:55 AM   Result Value Ref Range    Magnesium 1.9 1.6 - 2.6 mg/dL   Phosphorus    Collection Time: 17  4:55 AM   Result Value Ref Range    Phosphorus 5.1 (H) 2.7 - 4.5 mg/dL     Significant ImaginD echo 2017:  CONCLUSIONS     1 - Moderate left atrial enlargement.     2 - Eccentric hypertrophy.     3 - Normal left ventricular systolic function (EF 60-65%).     4 - Normal left ventricular diastolic function.     5 - Normal right ventricular systolic function .     6 - Mild mitral regurgitation.     7 - Filamentous structure attached to mitral valve (see details above).

## 2017-08-07 NOTE — CONSULTS
REASON FOR CONSULT: ESRD manegemnt    HISTORY OF PRESENT ILLNESS: 26 y.o. 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 presents for had concerns including Abnormal Lab (positive blood cultures). on 8/6/2017. She is on home  hemodialysis and  With 2 hr treatment  Time . Her  Prior history includes liver transplant in 1992 on chronic immunosuppression. She  presented to ER with subjective fever at home of 100.6 F. In ER patient was afebrile and patient denies any specific symptoms of cough, sinus congestion, sore throat, dysuria or flank pain.      Review of Systems - General ROS: negative for - chills, fatigue, fever, night sweats, weight gain or weight loss  ENT ROS: positive for - epistaxis, headaches, hearing change, nasal congestion, nasal discharge, nasal polyps, oral lesions, sinus pain, sneezing, sore throat, tinnitus and vertigo  Hematological and Lymphatic ROS: negative for - bleeding problems, blood clots, bruising, fatigue, jaundice, night sweats or swollen lymph nodes  Endocrine ROS: negative for - breast changes, galactorrhea, hair pattern changes, hot flashes, mood swings, palpitations, polydipsia/polyuria, skin changes or temperature intolerance  Respiratory ROS: no cough, shortness of breath, or wheezing  negative for - hemoptysis, pleuritic pain, sputum changes or stridor  Cardiovascular ROS: no chest pain or dyspnea on exertion  negative for - edema, irregular heartbeat, loss of consciousness, orthopnea, palpitations, paroxysmal nocturnal dyspnea or shortness of breath  Gastrointestinal ROS: no abdominal pain, change in bowel habits, or black or bloody  stools  negative for - abdominal pain, appetite loss, change in bowel habits, change in stools, gas/bloating, heartburn, nausea/vomiting, stool incontinence or swallowing difficulty/pain  Genito-Urinary ROS: no dysuria, trouble voiding, or hematuria  Musculoskeletal ROS: negative for - joint pain, joint stiffness, joint swelling, muscle pain or muscular weakness  Neurological ROS: no TIA or stroke symptoms  negative for - confusion, headaches, impaired coordination/balance, memory loss or visual changes  Dermatological ROS: negative for acne, dry skin, eczema, hair changes, lumps, mole changes and nail changes    PAST MEDICAL HISTORY:  Past Medical History:   Diagnosis Date    Allergy     Anemia in ESRD (end-stage renal disease) 10/12/2015    Anemia requiring transfusions 2015    ESRD (end stage renal disease) 2015    Hypertension     Kidney disease     Liver transplanted     Malignant hypertension with chronic kidney disease stage IV 2014    Renal disorder     Renovascular hypertension 10/2/2015       PAST SURGICAL HISTORY:  Past Surgical History:   Procedure Laterality Date     SECTION      2     LIVER BIOPSY      LIVER TRANSPLANT  1992    TUBAL LIGATION         FAMILY HISTORY:   Family History   Problem Relation Age of Onset    Hypertension Mother     Hypertension Father     Melanoma Neg Hx        SOCIAL HISTORY:  Social History     Social History    Marital status:      Spouse name: N/A    Number of children: N/A    Years of education: N/A     Occupational History    Not on file.     Social History Main Topics    Smoking status: Never Smoker    Smokeless tobacco: Never Used    Alcohol use No    Drug use: No    Sexual activity: Yes     Partners: Male     Other Topics Concern    Are You Pregnant Or Think You May Be? No    Breast-Feeding No     Social History Narrative    ** Merged History Encounter **            ALLERGIES:  Review of patient's allergies  "indicates:   Allergen Reactions    Chloral hydrate      Other reaction(s): Hallucinations  Other reaction(s): Hives    Hydrocodone        MEDICATIONS:Scheduled Meds:   sodium chloride 0.9%   Intravenous Once    amlodipine  10 mg Oral Daily    cinacalcet  60 mg Oral Daily with breakfast    cloNIDine  0.1 mg Oral BID    heparin (porcine)  5,000 Units Subcutaneous Daily    labetalol  200 mg Oral BID    predniSONE  1 mg Oral Every other day    tacrolimus  8 mg Oral Daily     Continuous Infusions:   PRN Meds:.ondansetron    PHYSICAL EXAM:  BP (!) 153/85 (BP Location: Right arm, Patient Position: Sitting, BP Method: Automatic)   Pulse 78   Temp 96.3 °F (35.7 °C) (Oral)   Resp 18   Ht 5' 5" (1.651 m)   Wt 59 kg (130 lb)   LMP 07/12/2017   SpO2 100%   Breastfeeding? No   BMI 21.63 kg/m²   General appearance: alert, appears stated age and cooperative  Head: Normocephalic, without obvious abnormality, atraumatic  Eyes: conjunctivae/corneas clear. PERRL, EOM's intact. Fundi benign.  Nose: Nares normal. Septum midline. Mucosa normal. No drainage or sinus tenderness.  Throat: lips, mucosa, and tongue normal; teeth and gums normal  Neck: no adenopathy, no carotid bruit, no JVD and thyroid not enlarged, symmetric, no tenderness/mass/nodules  Lungs: clear to auscultation bilaterally  Heart: regular rate and rhythm, S1, S2 normal, no murmur, click, rub or gallop  Abdomen: soft, non-tender; bowel sounds normal; no masses,  no organomegaly  Extremities: extremities normal, atraumatic, no cyanosis or edema  Pulses: 2+ and symmetric  Skin: Skin color, texture, turgor normal. No rashes or lesions  Neurologic: Grossly normal  Left RC AVF; Good thrill , no evidence of button hole infection.    INPUT/OUTPUT  I/O last 3 completed shifts:  In: 490 [P.O.:240; I.V.:250]  Out: -   -----------------------------------  No intake/output data recorded.      LABS:  Recent Results (from the past 24 hour(s))   CBC auto differential "    Collection Time: 08/06/17  9:30 AM   Result Value Ref Range    WBC 3.96 3.90 - 12.70 K/uL    RBC 3.53 (L) 4.00 - 5.40 M/uL    Hemoglobin 9.6 (L) 12.0 - 16.0 g/dL    Hematocrit 28.6 (L) 37.0 - 48.5 %    MCV 81 (L) 82 - 98 fL    MCH 27.2 27.0 - 31.0 pg    MCHC 33.6 32.0 - 36.0 g/dL    RDW 14.8 (H) 11.5 - 14.5 %    Platelets 72 (L) 150 - 350 K/uL    MPV SEE COMMENT 9.2 - 12.9 fL    Gran # 3.0 1.8 - 7.7 K/uL    Lymph # 0.4 (L) 1.0 - 4.8 K/uL    Mono # 0.4 0.3 - 1.0 K/uL    Eos # 0.1 0.0 - 0.5 K/uL    Baso # 0.01 0.00 - 0.20 K/uL    Gran% 76.5 (H) 38.0 - 73.0 %    Lymph% 11.1 (L) 18.0 - 48.0 %    Mono% 9.8 4.0 - 15.0 %    Eosinophil% 2.3 0.0 - 8.0 %    Basophil% 0.3 0.0 - 1.9 %    Differential Method Automated    Basic metabolic panel    Collection Time: 08/06/17  9:30 AM   Result Value Ref Range    Sodium 137 136 - 145 mmol/L    Potassium 4.7 3.5 - 5.1 mmol/L    Chloride 107 95 - 110 mmol/L    CO2 16 (L) 23 - 29 mmol/L    Glucose 94 70 - 110 mg/dL    BUN, Bld 60 (H) 6 - 20 mg/dL    Creatinine 11.0 (H) 0.5 - 1.4 mg/dL    Calcium 8.8 8.7 - 10.5 mg/dL    Anion Gap 14 8 - 16 mmol/L    eGFR if African American 4.9 (A) >60 mL/min/1.73 m^2    eGFR if non  4.3 (A) >60 mL/min/1.73 m^2   Blood culture #1 **CANNOT BE ORDERED STAT**    Collection Time: 08/06/17  9:30 AM   Result Value Ref Range    Blood Culture, Routine No Growth to date    Blood culture #2 **CANNOT BE ORDERED STAT**    Collection Time: 08/06/17  9:54 AM   Result Value Ref Range    Blood Culture, Routine No Growth to date    2D echo with color flow doppler    Collection Time: 08/06/17  5:36 PM   Result Value Ref Range    EF 60 55 - 65    Mitral Valve Regurgitation MILD     Diastolic Dysfunction No     Est. PA Systolic Pressure 24.9     Mitral Valve Mobility NORMAL    Vancomycin, random    Collection Time: 08/07/17  4:55 AM   Result Value Ref Range    Vancomycin, Random 12.6 Not established ug/mL   Comprehensive metabolic panel    Collection Time:  08/07/17  4:55 AM   Result Value Ref Range    Sodium 136 136 - 145 mmol/L    Potassium 4.8 3.5 - 5.1 mmol/L    Chloride 109 95 - 110 mmol/L    CO2 15 (L) 23 - 29 mmol/L    Glucose 92 70 - 110 mg/dL    BUN, Bld 63 (H) 6 - 20 mg/dL    Creatinine 11.3 (H) 0.5 - 1.4 mg/dL    Calcium 7.6 (L) 8.7 - 10.5 mg/dL    Total Protein 6.3 6.0 - 8.4 g/dL    Albumin 2.6 (L) 3.5 - 5.2 g/dL    Total Bilirubin 0.5 0.1 - 1.0 mg/dL    Alkaline Phosphatase 554 (H) 55 - 135 U/L    AST 43 (H) 10 - 40 U/L    ALT 39 10 - 44 U/L    Anion Gap 12 8 - 16 mmol/L    eGFR if African American 4.8 (A) >60 mL/min/1.73 m^2    eGFR if non  4.2 (A) >60 mL/min/1.73 m^2   CBC auto differential    Collection Time: 08/07/17  4:55 AM   Result Value Ref Range    WBC 3.83 (L) 3.90 - 12.70 K/uL    RBC 3.31 (L) 4.00 - 5.40 M/uL    Hemoglobin 8.9 (L) 12.0 - 16.0 g/dL    Hematocrit 27.3 (L) 37.0 - 48.5 %    MCV 83 82 - 98 fL    MCH 26.9 (L) 27.0 - 31.0 pg    MCHC 32.6 32.0 - 36.0 g/dL    RDW 15.0 (H) 11.5 - 14.5 %    Platelets 67 (L) 150 - 350 K/uL    MPV SEE COMMENT 9.2 - 12.9 fL    Gran # 2.0 1.8 - 7.7 K/uL    Lymph # 1.0 1.0 - 4.8 K/uL    Mono # 0.4 0.3 - 1.0 K/uL    Eos # 0.4 0.0 - 0.5 K/uL    Baso # 0.01 0.00 - 0.20 K/uL    Gran% 53.2 38.0 - 73.0 %    Lymph% 25.6 18.0 - 48.0 %    Mono% 11.0 4.0 - 15.0 %    Eosinophil% 9.9 (H) 0.0 - 8.0 %    Basophil% 0.3 0.0 - 1.9 %    Differential Method Automated    Magnesium    Collection Time: 08/07/17  4:55 AM   Result Value Ref Range    Magnesium 1.9 1.6 - 2.6 mg/dL   Phosphorus    Collection Time: 08/07/17  4:55 AM   Result Value Ref Range    Phosphorus 5.1 (H) 2.7 - 4.5 mg/dL         ASSESSMENT:  Patient Active Problem List   Diagnosis   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    Prophylactic immunotherapy    Hypertensive urgency, malignant    Anemia requiring transfusions    ESRD on hemodialysis    Renovascular hypertension    Anemia in ESRD (end-stage renal disease)    Mild protein-calorie malnutrition     Chronic rejection of liver transplant    Microcytic normochromic anemia    Thrombocytopenia    History of splenomegaly    Immunosuppressed    Secondary hyperparathyroidism    Staphylococcus aureus bacteremia       PLAN:  1. ESRD ; we will do HD today.She is on home hemodialysis 2 hrs for 5 days a week.  2. Bacteremia; No evidence of HDVA infection button holes are non tender. Workup initiated on  Vancomycin for gram positive rods.  3. Anemia; Epogen today.  4. MBD ; we will obtain care plan and give the VDRA.

## 2017-08-07 NOTE — PROGRESS NOTES
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 She is stable on vancomycin with an echo suggestive of MV endocarditis (filament on MV; however, on atypical side of heart for infection).    - would continue vancomycin with HD  - can hold on JEFFREY for now as all 4 valves well seen on TTE per cardiology (no evidence of abscess)  - would obtain ultrasound of dialysis access to assess for abscess  - await pending repeat cultures to assess for clearance  - of note, patient will require parenteral antibiotics and will require TIW HD on discharge while on this antibiotics course (likely 6wks)            Anticipated Disposition: pending improvement    Thank you for your consult. I will follow-up with patient. Please contact us if you have any additional questions.     Zarina Gilbert MD  Transplant ID Attending  [id number]    Zarina Gilbert MD  Infectious Disease  [facility]    Subjective:     Principal Problem:MRSA bacteremia    HPI: Ms. 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 She was administered empiric CTX and discharged yesterday when preliminary culture results returned as unrevealing and given that she felt well and was afebrile in house. She was readmitted today on 8/6/2017 due to those prior blood cultures becoming positive with probably Staph species, now known to be S.aureus. Patient denies recent skin cuts, sores, or redness/swelling to her fistula. She denies HA, AMS, URI symptoms, cough, SOB, N/V/D, abdominal pain, dysuria, or rash. She has no indwelling prostheses or vascular cathethers.  Interval History: Feels well. Afebrile. MRSA in cx.    Review of Systems   Constitutional: Negative for activity change, appetite change, chills, diaphoresis and fever.   HENT: Negative for ear pain, mouth sores, sinus pressure and sore throat.    Eyes: Negative for photophobia, pain and redness.   Respiratory: Negative for cough, shortness of breath and wheezing.    Cardiovascular: Negative for chest pain and leg swelling.   Gastrointestinal: Negative for abdominal distention, abdominal pain, diarrhea and nausea.   Genitourinary: Negative for dysuria, flank pain, frequency and urgency.   Musculoskeletal: Negative for arthralgias, back pain, gait problem and myalgias.   Skin: Negative for pallor and rash.   Neurological: Negative for dizziness, tremors, seizures and headaches.   Psychiatric/Behavioral: Negative for confusion.     Objective:     Vital Signs (Most Recent):  Temp: 98.3 °F (36.8 °C) (08/07/17 1555)  Pulse: 95 (08/07/17 1555)  Resp: 18 (08/07/17 1555)  BP:  (!) 147/73 (08/07/17 1555)  SpO2: 100 % (08/07/17 1555) Vital Signs (24h Range):  Temp:  [96.3 °F (35.7 °C)-98.3 °F (36.8 °C)] 98.3 °F (36.8 °C)  Pulse:  [67-95] 95  Resp:  [18] 18  SpO2:  [98 %-100 %] 100 %  BP: (135-156)/() 147/73     Weight: 59 kg (130 lb)  Body mass index is 21.63 kg/m².    Estimated Creatinine Clearance: 6.8 mL/min (based on Cr of 11.3).    Physical Exam   Constitutional: She is oriented to person, place, and time. She appears well-developed and well-nourished. No distress.   HENT:   Head: Atraumatic.   Mouth/Throat: Oropharynx is clear and moist. No oropharyngeal exudate.   Eyes: Conjunctivae and EOM are normal. Pupils are equal, round, and reactive to light. No scleral icterus.   Neck: Neck supple.   Cardiovascular: Normal rate and regular rhythm.  Exam reveals no friction rub.    Murmur heard.  Pulmonary/Chest: Breath sounds normal. No respiratory distress. She has no wheezes. She has no rales. She exhibits no tenderness.   Abdominal: Soft. Bowel sounds are normal. She exhibits no distension. There is no tenderness. There is no rebound and no guarding.   Musculoskeletal: Normal range of motion. She exhibits no edema.   Lymphadenopathy:     She has no cervical adenopathy.   Neurological: She is alert and oriented to person, place, and time. No cranial nerve deficit. She exhibits normal muscle tone. Coordination normal.   Skin: No rash noted. No erythema.   AVF w/o erythema/drainage.       Significant Labs:   CBC:   Recent Labs  Lab 08/06/17  0930 08/07/17  0455   WBC 3.96 3.83*   HGB 9.6* 8.9*   HCT 28.6* 27.3*   PLT 72* 67*     CMP:   Recent Labs  Lab 08/06/17  0930 08/07/17  0455    136   K 4.7 4.8    109   CO2 16* 15*   GLU 94 92   BUN 60* 63*   CREATININE 11.0* 11.3*   CALCIUM 8.8 7.6*   PROT  --  6.3   ALBUMIN  --  2.6*   BILITOT  --  0.5   ALKPHOS  --  554*   AST  --  43*   ALT  --  39   ANIONGAP 14 12   EGFRNONAA 4.3* 4.2*       Significant Imaging: I have reviewed  all pertinent imaging results/findings within the past 24 hours.     CXR: clear    2D echo: (d/w staff cardiologist)    1 - Moderate left atrial enlargement.     2 - Eccentric hypertrophy.     3 - Normal left ventricular systolic function (EF 60-65%).     4 - Normal left ventricular diastolic function.     5 - Normal right ventricular systolic function .     6 - Mild mitral regurgitation.     7 - Filamentous structure attached to mitral valve (see details above).      Microbiology:  8/5 blood cx: MRSA x2  8/6 blood cx: negative x2

## 2017-08-07 NOTE — PLAN OF CARE
Problem: Pain, Acute (Adult)  Goal: Acceptable Pain Control/Comfort Level  Patient will demonstrate the desired outcomes by discharge/transition of care.    08/07/17 6174   Pain, Acute (Adult)   Acceptable Pain Control/Comfort Level making progress toward outcome   Frequent rounds to assess pain and safety. Isolation precautions maintained. Encouraged use of IS, and to call for assistance as needed. Call bell in reach.

## 2017-08-08 LAB
ALBUMIN SERPL BCP-MCNC: 2.7 G/DL
ALP SERPL-CCNC: 582 U/L
ALT SERPL W/O P-5'-P-CCNC: 35 U/L
ANION GAP SERPL CALC-SCNC: 10 MMOL/L
AST SERPL-CCNC: 41 U/L
BASOPHILS # BLD AUTO: 0.02 K/UL
BASOPHILS NFR BLD: 0.6 %
BILIRUB SERPL-MCNC: 0.6 MG/DL
BUN SERPL-MCNC: 27 MG/DL
CALCIUM SERPL-MCNC: 7.9 MG/DL
CHLORIDE SERPL-SCNC: 99 MMOL/L
CO2 SERPL-SCNC: 29 MMOL/L
CREAT SERPL-MCNC: 7.1 MG/DL
DIFFERENTIAL METHOD: ABNORMAL
EOSINOPHIL # BLD AUTO: 0.3 K/UL
EOSINOPHIL NFR BLD: 7.7 %
ERYTHROCYTE [DISTWIDTH] IN BLOOD BY AUTOMATED COUNT: 14.8 %
EST. GFR  (AFRICAN AMERICAN): 8.4 ML/MIN/1.73 M^2
EST. GFR  (NON AFRICAN AMERICAN): 7.3 ML/MIN/1.73 M^2
GLUCOSE SERPL-MCNC: 91 MG/DL
HCT VFR BLD AUTO: 29.3 %
HGB BLD-MCNC: 9.7 G/DL
LYMPHOCYTES # BLD AUTO: 0.8 K/UL
LYMPHOCYTES NFR BLD: 22.9 %
MAGNESIUM SERPL-MCNC: 1.8 MG/DL
MCH RBC QN AUTO: 27.3 PG
MCHC RBC AUTO-ENTMCNC: 33.1 G/DL
MCV RBC AUTO: 83 FL
MONOCYTES # BLD AUTO: 0.3 K/UL
MONOCYTES NFR BLD: 9.4 %
NEUTROPHILS # BLD AUTO: 2.1 K/UL
NEUTROPHILS NFR BLD: 58.8 %
PHOSPHATE SERPL-MCNC: 4.8 MG/DL
PLATELET # BLD AUTO: 77 K/UL
PMV BLD AUTO: 9.8 FL
POTASSIUM SERPL-SCNC: 4 MMOL/L
PROT SERPL-MCNC: 6.6 G/DL
RBC # BLD AUTO: 3.55 M/UL
SODIUM SERPL-SCNC: 138 MMOL/L
TACROLIMUS BLD-MCNC: 8.5 NG/ML
VANCOMYCIN SERPL-MCNC: 36.1 UG/ML
WBC # BLD AUTO: 3.62 K/UL

## 2017-08-08 PROCEDURE — 80053 COMPREHEN METABOLIC PANEL: CPT

## 2017-08-08 PROCEDURE — 99232 SBSQ HOSP IP/OBS MODERATE 35: CPT | Mod: GC,,, | Performed by: INTERNAL MEDICINE

## 2017-08-08 PROCEDURE — 85025 COMPLETE CBC W/AUTO DIFF WBC: CPT

## 2017-08-08 PROCEDURE — 63600175 PHARM REV CODE 636 W HCPCS: Performed by: STUDENT IN AN ORGANIZED HEALTH CARE EDUCATION/TRAINING PROGRAM

## 2017-08-08 PROCEDURE — 99233 SBSQ HOSP IP/OBS HIGH 50: CPT | Mod: ,,, | Performed by: INTERNAL MEDICINE

## 2017-08-08 PROCEDURE — 11000001 HC ACUTE MED/SURG PRIVATE ROOM

## 2017-08-08 PROCEDURE — 93990 DOPPLER FLOW TESTING: CPT | Mod: 26,,, | Performed by: SURGERY

## 2017-08-08 PROCEDURE — 80197 ASSAY OF TACROLIMUS: CPT

## 2017-08-08 PROCEDURE — 25000003 PHARM REV CODE 250: Performed by: STUDENT IN AN ORGANIZED HEALTH CARE EDUCATION/TRAINING PROGRAM

## 2017-08-08 PROCEDURE — 84100 ASSAY OF PHOSPHORUS: CPT

## 2017-08-08 PROCEDURE — 83735 ASSAY OF MAGNESIUM: CPT

## 2017-08-08 PROCEDURE — 36415 COLL VENOUS BLD VENIPUNCTURE: CPT

## 2017-08-08 PROCEDURE — 80202 ASSAY OF VANCOMYCIN: CPT

## 2017-08-08 RX ADMIN — AMLODIPINE BESYLATE 10 MG: 10 TABLET ORAL at 08:08

## 2017-08-08 RX ADMIN — CLONIDINE HYDROCHLORIDE 0.1 MG: 0.1 TABLET ORAL at 08:08

## 2017-08-08 RX ADMIN — LABETALOL HYDROCHLORIDE 200 MG: 200 TABLET, FILM COATED ORAL at 09:08

## 2017-08-08 RX ADMIN — LABETALOL HYDROCHLORIDE 200 MG: 200 TABLET, FILM COATED ORAL at 08:08

## 2017-08-08 RX ADMIN — CLONIDINE HYDROCHLORIDE 0.1 MG: 0.1 TABLET ORAL at 09:08

## 2017-08-08 RX ADMIN — HEPARIN SODIUM 5000 UNITS: 5000 INJECTION, SOLUTION INTRAVENOUS; SUBCUTANEOUS at 08:08

## 2017-08-08 RX ADMIN — TACROLIMUS 8 MG: 5 CAPSULE ORAL at 08:08

## 2017-08-08 RX ADMIN — TACROLIMUS 8 MG: 5 CAPSULE ORAL at 06:08

## 2017-08-08 NOTE — ASSESSMENT & PLAN NOTE
Home HD schedule is Sun, Mon, Tue, Thu & Sat  -Will need to be adjusted to 3x weekly while patient is receiving parenteral antibiotics per ID and will need to remain on HD 3x weekly in order to receive vanc dosing with her schedule at home  -Last HD yesterday afternoon

## 2017-08-08 NOTE — ASSESSMENT & PLAN NOTE
Stable and at baseline in this ESRD patient on HD  -Hgb 9.7 today which is baseline, will monitor for changes

## 2017-08-08 NOTE — PROGRESS NOTES
[facility] Hospital Medicine  Progress Note    Patient Name: Holly Patel  MRN: [id number]  Patient Class: IP- Inpatient   Admission Date: 8/6/2017  Length of Stay: 2 days  Attending Physician: Galindo Amor MD  Primary Care Provider: [facility] Medicine Team: [facility] HOSP MED 4 David Lopez MD    Subjective:     Principal Problem:MRSA bacteremia    HPI:  Holly Patel is a 26 y.o. F with HTN, hemangioendothelioma s/p LTx (1992), ESRD on home hemodialysis (still makes urine), and secondary PTH who presented to the ED on 08/04/2017 c/o fever x1 day. She was worked up for infectious cause, but was pan negative on workup and it was decided that etiology was likely viral and she was sent home. Later on after discharge, blood cultures returned 4/4 positive for gram positive rods and the patient was contacted and told to come back to the ED. She states that she felt better at that time and would report to the hospital in the morning. On readmission, she was started on vancomycin and then transferred to the floor. Denies recent travel. She has not had similar symptoms, no prior h/o UTI. She denies any n/v/d, cough, sinus congestion, change in appetite, dysuria, urgency in last few or any sick contacts.    Hospital Course:  No notes on file    Interval History: NAEON. Patient feels at baseline. Vanc trough came back elevated this AM, will speak with pharm and readjust accordingly. ID to help coordinate HD vanc schedule o/p.    Review of Systems   Constitutional: Negative for appetite change, fatigue and unexpected weight change.        Generalized malaise   HENT: Negative for congestion, ear pain, sore throat and trouble swallowing.    Eyes: Negative for visual disturbance.   Respiratory: Negative for cough and shortness of breath.    Cardiovascular: Negative for chest pain and leg swelling.   Gastrointestinal: Negative for abdominal distention,  abdominal pain, constipation, diarrhea, nausea and vomiting.   Genitourinary: Negative for difficulty urinating, dysuria and hematuria.   Musculoskeletal: Negative for myalgias.   Skin: Negative for rash.   Neurological: Negative for dizziness, weakness, light-headedness and headaches.     Objective:     Vital Signs (Most Recent):  Temp: 97.8 °F (36.6 °C) (08/08/17 1149)  Pulse: 84 (08/08/17 1149)  Resp: 18 (08/08/17 1149)  BP: 124/72 (08/08/17 1149)  SpO2: 100 % (08/08/17 1149) Vital Signs (24h Range):  Temp:  [97.5 °F (36.4 °C)-98.6 °F (37 °C)] 97.8 °F (36.6 °C)  Pulse:  [84-95] 84  Resp:  [16-18] 18  SpO2:  [97 %-100 %] 100 %  BP: (124-149)/(60-91) 124/72     Weight: 59 kg (130 lb)  Body mass index is 21.63 kg/m².  No intake or output data in the 24 hours ending 08/08/17 1425   Physical Exam   Constitutional: She is oriented to person, place, and time. She appears well-developed and well-nourished.   HENT:   Head: Normocephalic and atraumatic.   Eyes: EOM are normal. Pupils are equal, round, and reactive to light.   Neck: No JVD present.   Cardiovascular: Normal rate, regular rhythm and intact distal pulses.    Murmur heard.   Systolic murmur is present with a grade of 2/6   Pulmonary/Chest: Effort normal and breath sounds normal. No respiratory distress.   Abdominal: Soft. Bowel sounds are normal. She exhibits no distension. There is no tenderness.   Musculoskeletal: She exhibits no edema.   AV fistula on left arm - has good thrill. No erythema, inflammation, TTP around site   Neurological: She is alert and oriented to person, place, and time.   Skin: Skin is warm and dry. Capillary refill takes less than 2 seconds.   Psychiatric: She has a normal mood and affect. Her behavior is normal. Judgment and thought content normal.   Vitals reviewed.      Significant Labs:   Recent Results (from the past 24 hour(s))   Magnesium    Collection Time: 08/08/17  4:57 AM   Result Value Ref Range    Magnesium 1.8 1.6 - 2.6  mg/dL   Phosphorus    Collection Time: 08/08/17  4:57 AM   Result Value Ref Range    Phosphorus 4.8 (H) 2.7 - 4.5 mg/dL   Vancomycin, random    Collection Time: 08/08/17  4:57 AM   Result Value Ref Range    Vancomycin, Random 36.1 Not established ug/mL   Tacrolimus level    Collection Time: 08/08/17  4:57 AM   Result Value Ref Range    Tacrolimus Lvl 8.5 5.0 - 15.0 ng/mL   CBC auto differential    Collection Time: 08/08/17  8:04 AM   Result Value Ref Range    WBC 3.62 (L) 3.90 - 12.70 K/uL    RBC 3.55 (L) 4.00 - 5.40 M/uL    Hemoglobin 9.7 (L) 12.0 - 16.0 g/dL    Hematocrit 29.3 (L) 37.0 - 48.5 %    MCV 83 82 - 98 fL    MCH 27.3 27.0 - 31.0 pg    MCHC 33.1 32.0 - 36.0 g/dL    RDW 14.8 (H) 11.5 - 14.5 %    Platelets 77 (L) 150 - 350 K/uL    MPV 9.8 9.2 - 12.9 fL    Gran # 2.1 1.8 - 7.7 K/uL    Lymph # 0.8 (L) 1.0 - 4.8 K/uL    Mono # 0.3 0.3 - 1.0 K/uL    Eos # 0.3 0.0 - 0.5 K/uL    Baso # 0.02 0.00 - 0.20 K/uL    Gran% 58.8 38.0 - 73.0 %    Lymph% 22.9 18.0 - 48.0 %    Mono% 9.4 4.0 - 15.0 %    Eosinophil% 7.7 0.0 - 8.0 %    Basophil% 0.6 0.0 - 1.9 %    Differential Method Automated    Comprehensive metabolic panel    Collection Time: 08/08/17  8:05 AM   Result Value Ref Range    Sodium 138 136 - 145 mmol/L    Potassium 4.0 3.5 - 5.1 mmol/L    Chloride 99 95 - 110 mmol/L    CO2 29 23 - 29 mmol/L    Glucose 91 70 - 110 mg/dL    BUN, Bld 27 (H) 6 - 20 mg/dL    Creatinine 7.1 (H) 0.5 - 1.4 mg/dL    Calcium 7.9 (L) 8.7 - 10.5 mg/dL    Total Protein 6.6 6.0 - 8.4 g/dL    Albumin 2.7 (L) 3.5 - 5.2 g/dL    Total Bilirubin 0.6 0.1 - 1.0 mg/dL    Alkaline Phosphatase 582 (H) 55 - 135 U/L    AST 41 (H) 10 - 40 U/L    ALT 35 10 - 44 U/L    Anion Gap 10 8 - 16 mmol/L    eGFR if African American 8.4 (A) >60 mL/min/1.73 m^2    eGFR if non  7.3 (A) >60 mL/min/1.73 m^2     Significant Imaging:   VAS U/S HD Access  Impression:   Color flow duplex exam reveals a patent left radiocephalic AV fistula. There is no  evidence of a focal hemodynamically significant stenosis, despite elevated velocities noted at the anastomosis. Volume flow at mid bicep level measures 1254 ml./min.    Assessment/Plan:      * MRSA bacteremia    Bacteremia 4/4 blood cultures from 08/05/2017, sensitivities pending  Blood cultures from readmit -->08/06 Bcx no growth 2 days; 08/07 Bcx no growth 1 day  -L arm fistula patent without evidence of thrombus or stenosis  -TTE not ordered as ID believes a vegetartion to be unlikley  -VAS u/S of L arm graft was patent without stenosis  -Renally dosing vanc, CrCl 4.5; given 750mg Sunday; random vanc level 12.6 yesterday; received 1g yesterday s/p HD with a random level this AM of 26; consulted pharmD to assist with dosing  -ID to assist with dosing outpatient, will f/u their recs      ESRD on hemodialysis    Home HD schedule is Sun, Mon, Tue, Thu & Sat  -Will need to be adjusted to 3x weekly while patient is receiving parenteral antibiotics per ID and will need to remain on HD 3x weekly in order to receive vanc dosing with her schedule at home  -Last HD yesterday afternoo      Hypertensive urgency, malignant    Normotensive after reinitiating of home meds  Asymptomatic and no overt signs of end-organ damage at this time  - continue home BP meds amlodipine, clonidine and labetalol.       Secondary hyperparathyroidism    2/2 ESRD      Liver replaced by transplant    Transplanted in 1992 for hemangioendothelioma; patient is on prograf & prednisone at home  -Prednisone every other day while in hospital; next dose 08/07/2017  -Prograf held in light of infection      Anemia in ESRD (end-stage renal disease)    Stable and at baseline in this ESRD patient on HD  -Hgb 9.7 today which is baseline, will monitor for changes      Bacteremia    See MRSA bacteremia      Immunosuppressed    S/p liver transplant in 1992  -Prograf at home      Thrombocytopenia    77 currently which is baseline      Chronic rejection of liver  transplant    Prograf at home, held inpatient     VTE Risk Mitigation         Ordered     heparin (porcine) injection 5,000 Units  Daily     Route:  Subcutaneous        08/07/17 0718     Medium Risk of VTE  Once      08/06/17 1122     Place LINDA hose  Until discontinued      08/06/17 1122        David Lopez MD  Department of Hospital Medicine   [facility]

## 2017-08-08 NOTE — ASSESSMENT & PLAN NOTE
Bacteremia 4/4 blood cultures from 08/05/2017, sensitivities pending  [facility] no growth 2 days; 08/07 Bcx no growth 1 day  -L arm fistula patent without evidence of thrombus or stenosis  -TTE not ordered as ID believes a vegetartion to be unlikley  -VAS u/S of L arm graft was patent without stenosis  -Renally dosing vanc, CrCl 4.5; given 750mg Sunday; random vanc level 12.6 yesterday; received 1g yesterday s/p HD with a random level this AM of 26; consulted pharmD to assist with dosing  -ID to assist with dosing outpatient, will f/u their recs

## 2017-08-08 NOTE — PROGRESS NOTES
VANCOMYCIN CONSULT     Current Vancomycin Order: None. Patient has receives vanco 750 mg on 8/6 and vanco 1g on 8/7     Vancomycin Indication: MRSA bacteremia     Renal Replacement Therapy: ESRD on home HD (M,T,Th,Sat, Sun)    Planned Duration of Therapy: 6 weeks     Trough Goal: 15-20    Vancomycin level: 36.1     Recommendations: Will suggest that patient start regular HD TIW to be able to appropriately dose and monitor vancomycin level. If started on HD TIW vancomycin dose should be 500 mg post HD on HD day.     For questions, please contact Eneida Arnett, PharmD Ext: [phone]

## 2017-08-08 NOTE — PLAN OF CARE
Problem: Patient Care Overview  Goal: Plan of Care Review  Outcome: Ongoing (interventions implemented as appropriate)    Pt AAO x 3 Pt NPO tonight for Transesophageal echo in AM Pt resting and denies abdominal pain at this time. Whiteboard updated. Q2 hour monitoring followed will continue to monitor. Call light in reach.

## 2017-08-08 NOTE — SUBJECTIVE & OBJECTIVE
Interval History: NAEON. Patient feels at baseline. Vanc trough came back elevated this Am, will speak with parm and readjust accordingly.     Review of Systems   Constitutional: Negative for appetite change, fatigue and unexpected weight change.        Generalized malaise   HENT: Negative for congestion, ear pain, sore throat and trouble swallowing.    Eyes: Negative for visual disturbance.   Respiratory: Negative for cough and shortness of breath.    Cardiovascular: Negative for chest pain and leg swelling.   Gastrointestinal: Negative for abdominal distention, abdominal pain, constipation, diarrhea, nausea and vomiting.   Genitourinary: Negative for difficulty urinating, dysuria and hematuria.   Musculoskeletal: Negative for myalgias.   Skin: Negative for rash.   Neurological: Negative for dizziness, weakness, light-headedness and headaches.     Objective:     Vital Signs (Most Recent):  Temp: 97.8 °F (36.6 °C) (08/08/17 1149)  Pulse: 84 (08/08/17 1149)  Resp: 18 (08/08/17 1149)  BP: 124/72 (08/08/17 1149)  SpO2: 100 % (08/08/17 1149) Vital Signs (24h Range):  Temp:  [97.5 °F (36.4 °C)-98.6 °F (37 °C)] 97.8 °F (36.6 °C)  Pulse:  [84-95] 84  Resp:  [16-18] 18  SpO2:  [97 %-100 %] 100 %  BP: (124-149)/(60-91) 124/72     Weight: 59 kg (130 lb)  Body mass index is 21.63 kg/m².  No intake or output data in the 24 hours ending 08/08/17 1425   Physical Exam   Constitutional: She is oriented to person, place, and time. She appears well-developed and well-nourished.   HENT:   Head: Normocephalic and atraumatic.   Eyes: EOM are normal. Pupils are equal, round, and reactive to light.   Neck: No JVD present.   Cardiovascular: Normal rate, regular rhythm and intact distal pulses.    Murmur heard.   Systolic murmur is present with a grade of 2/6   Pulmonary/Chest: Effort normal and breath sounds normal. No respiratory distress.   Abdominal: Soft. Bowel sounds are normal. She exhibits no distension. There is no tenderness.    Musculoskeletal: She exhibits no edema.   AV fistula on left arm - has good thrill. No erythema, inflammation, TTP around site   Neurological: She is alert and oriented to person, place, and time.   Skin: Skin is warm and dry. Capillary refill takes less than 2 seconds.   Psychiatric: She has a normal mood and affect. Her behavior is normal. Judgment and thought content normal.   Vitals reviewed.      Significant Labs:   Recent Results (from the past 24 hour(s))   Magnesium    Collection Time: 08/08/17  4:57 AM   Result Value Ref Range    Magnesium 1.8 1.6 - 2.6 mg/dL   Phosphorus    Collection Time: 08/08/17  4:57 AM   Result Value Ref Range    Phosphorus 4.8 (H) 2.7 - 4.5 mg/dL   Vancomycin, random    Collection Time: 08/08/17  4:57 AM   Result Value Ref Range    Vancomycin, Random 36.1 Not established ug/mL   Tacrolimus level    Collection Time: 08/08/17  4:57 AM   Result Value Ref Range    Tacrolimus Lvl 8.5 5.0 - 15.0 ng/mL   CBC auto differential    Collection Time: 08/08/17  8:04 AM   Result Value Ref Range    WBC 3.62 (L) 3.90 - 12.70 K/uL    RBC 3.55 (L) 4.00 - 5.40 M/uL    Hemoglobin 9.7 (L) 12.0 - 16.0 g/dL    Hematocrit 29.3 (L) 37.0 - 48.5 %    MCV 83 82 - 98 fL    MCH 27.3 27.0 - 31.0 pg    MCHC 33.1 32.0 - 36.0 g/dL    RDW 14.8 (H) 11.5 - 14.5 %    Platelets 77 (L) 150 - 350 K/uL    MPV 9.8 9.2 - 12.9 fL    Gran # 2.1 1.8 - 7.7 K/uL    Lymph # 0.8 (L) 1.0 - 4.8 K/uL    Mono # 0.3 0.3 - 1.0 K/uL    Eos # 0.3 0.0 - 0.5 K/uL    Baso # 0.02 0.00 - 0.20 K/uL    Gran% 58.8 38.0 - 73.0 %    Lymph% 22.9 18.0 - 48.0 %    Mono% 9.4 4.0 - 15.0 %    Eosinophil% 7.7 0.0 - 8.0 %    Basophil% 0.6 0.0 - 1.9 %    Differential Method Automated    Comprehensive metabolic panel    Collection Time: 08/08/17  8:05 AM   Result Value Ref Range    Sodium 138 136 - 145 mmol/L    Potassium 4.0 3.5 - 5.1 mmol/L    Chloride 99 95 - 110 mmol/L    CO2 29 23 - 29 mmol/L    Glucose 91 70 - 110 mg/dL    BUN, Bld 27 (H) 6 - 20 mg/dL     Creatinine 7.1 (H) 0.5 - 1.4 mg/dL    Calcium 7.9 (L) 8.7 - 10.5 mg/dL    Total Protein 6.6 6.0 - 8.4 g/dL    Albumin 2.7 (L) 3.5 - 5.2 g/dL    Total Bilirubin 0.6 0.1 - 1.0 mg/dL    Alkaline Phosphatase 582 (H) 55 - 135 U/L    AST 41 (H) 10 - 40 U/L    ALT 35 10 - 44 U/L    Anion Gap 10 8 - 16 mmol/L    eGFR if African American 8.4 (A) >60 mL/min/1.73 m^2    eGFR if non  7.3 (A) >60 mL/min/1.73 m^2     Significant Imaging:   VAS U/S HD Access  Impression:   Color flow duplex exam reveals a patent left radiocephalic AV fistula. There is no evidence of a focal hemodynamically significant stenosis, despite elevated velocities noted at the anastomosis. Volume flow at mid bicep level measures 1254 ml./min.

## 2017-08-09 VITALS
RESPIRATION RATE: 18 BRPM | BODY MASS INDEX: 21.66 KG/M2 | DIASTOLIC BLOOD PRESSURE: 90 MMHG | HEIGHT: 65 IN | TEMPERATURE: 98 F | WEIGHT: 130 LBS | OXYGEN SATURATION: 100 % | HEART RATE: 83 BPM | SYSTOLIC BLOOD PRESSURE: 147 MMHG

## 2017-08-09 LAB
ALBUMIN SERPL BCP-MCNC: 2.9 G/DL
ALP SERPL-CCNC: 588 U/L
ALT SERPL W/O P-5'-P-CCNC: 35 U/L
ANION GAP SERPL CALC-SCNC: 10 MMOL/L
ANISOCYTOSIS BLD QL SMEAR: SLIGHT
AST SERPL-CCNC: 37 U/L
BASOPHILS # BLD AUTO: 0.02 K/UL
BASOPHILS NFR BLD: 0.5 %
BILIRUB SERPL-MCNC: 0.6 MG/DL
BUN SERPL-MCNC: 34 MG/DL
CALCIUM SERPL-MCNC: 8.2 MG/DL
CHLORIDE SERPL-SCNC: 100 MMOL/L
CO2 SERPL-SCNC: 27 MMOL/L
CREAT SERPL-MCNC: 8.5 MG/DL
DACRYOCYTES BLD QL SMEAR: ABNORMAL
DIFFERENTIAL METHOD: ABNORMAL
EOSINOPHIL # BLD AUTO: 0.4 K/UL
EOSINOPHIL NFR BLD: 10.9 %
ERYTHROCYTE [DISTWIDTH] IN BLOOD BY AUTOMATED COUNT: 14.8 %
EST. GFR  (AFRICAN AMERICAN): 6.8 ML/MIN/1.73 M^2
EST. GFR  (NON AFRICAN AMERICAN): 5.9 ML/MIN/1.73 M^2
GLUCOSE SERPL-MCNC: 97 MG/DL
HCT VFR BLD AUTO: 29.6 %
HGB BLD-MCNC: 9.9 G/DL
LYMPHOCYTES # BLD AUTO: 1.2 K/UL
LYMPHOCYTES NFR BLD: 31.3 %
MAGNESIUM SERPL-MCNC: 1.8 MG/DL
MCH RBC QN AUTO: 27 PG
MCHC RBC AUTO-ENTMCNC: 33.4 G/DL
MCV RBC AUTO: 81 FL
MONOCYTES # BLD AUTO: 0.3 K/UL
MONOCYTES NFR BLD: 8.5 %
NEUTROPHILS # BLD AUTO: 1.8 K/UL
NEUTROPHILS NFR BLD: 48.8 %
OVALOCYTES BLD QL SMEAR: ABNORMAL
PHOSPHATE SERPL-MCNC: 5.8 MG/DL
PLATELET # BLD AUTO: 92 K/UL
PLATELET BLD QL SMEAR: ABNORMAL
PMV BLD AUTO: 10.1 FL
POIKILOCYTOSIS BLD QL SMEAR: SLIGHT
POLYCHROMASIA BLD QL SMEAR: ABNORMAL
POTASSIUM SERPL-SCNC: 4 MMOL/L
PROT SERPL-MCNC: 6.8 G/DL
RBC # BLD AUTO: 3.66 M/UL
SCHISTOCYTES BLD QL SMEAR: PRESENT
SODIUM SERPL-SCNC: 137 MMOL/L
TACROLIMUS BLD-MCNC: 13.1 NG/ML
VANCOMYCIN SERPL-MCNC: 27.8 UG/ML
WBC # BLD AUTO: 3.86 K/UL

## 2017-08-09 PROCEDURE — 85025 COMPLETE CBC W/AUTO DIFF WBC: CPT

## 2017-08-09 PROCEDURE — 99239 HOSP IP/OBS DSCHRG MGMT >30: CPT | Mod: GC,,, | Performed by: INTERNAL MEDICINE

## 2017-08-09 PROCEDURE — 36415 COLL VENOUS BLD VENIPUNCTURE: CPT

## 2017-08-09 PROCEDURE — 96372 THER/PROPH/DIAG INJ SC/IM: CPT

## 2017-08-09 PROCEDURE — 80202 ASSAY OF VANCOMYCIN: CPT

## 2017-08-09 PROCEDURE — 99233 SBSQ HOSP IP/OBS HIGH 50: CPT | Mod: GC,,, | Performed by: INTERNAL MEDICINE

## 2017-08-09 PROCEDURE — 80100016 HC MAINTENANCE HEMODIALYSIS

## 2017-08-09 PROCEDURE — 80197 ASSAY OF TACROLIMUS: CPT

## 2017-08-09 PROCEDURE — 80053 COMPREHEN METABOLIC PANEL: CPT

## 2017-08-09 PROCEDURE — 90935 HEMODIALYSIS ONE EVALUATION: CPT | Mod: ,,, | Performed by: INTERNAL MEDICINE

## 2017-08-09 PROCEDURE — 63600175 PHARM REV CODE 636 W HCPCS: Performed by: STUDENT IN AN ORGANIZED HEALTH CARE EDUCATION/TRAINING PROGRAM

## 2017-08-09 PROCEDURE — 83735 ASSAY OF MAGNESIUM: CPT

## 2017-08-09 PROCEDURE — 25000003 PHARM REV CODE 250: Performed by: STUDENT IN AN ORGANIZED HEALTH CARE EDUCATION/TRAINING PROGRAM

## 2017-08-09 PROCEDURE — 63600175 PHARM REV CODE 636 W HCPCS: Performed by: INTERNAL MEDICINE

## 2017-08-09 PROCEDURE — 25000003 PHARM REV CODE 250: Performed by: INTERNAL MEDICINE

## 2017-08-09 PROCEDURE — 84100 ASSAY OF PHOSPHORUS: CPT

## 2017-08-09 RX ORDER — SODIUM CHLORIDE 9 MG/ML
INJECTION, SOLUTION INTRAVENOUS ONCE
Status: COMPLETED | OUTPATIENT
Start: 2017-08-09 | End: 2017-08-09

## 2017-08-09 RX ADMIN — TACROLIMUS 8 MG: 5 CAPSULE ORAL at 09:08

## 2017-08-09 RX ADMIN — AMLODIPINE BESYLATE 10 MG: 10 TABLET ORAL at 01:08

## 2017-08-09 RX ADMIN — PREDNISONE 1 MG: 1 TABLET ORAL at 09:08

## 2017-08-09 RX ADMIN — HEPARIN SODIUM 5000 UNITS: 5000 INJECTION, SOLUTION INTRAVENOUS; SUBCUTANEOUS at 01:08

## 2017-08-09 RX ADMIN — LABETALOL HYDROCHLORIDE 200 MG: 200 TABLET, FILM COATED ORAL at 01:08

## 2017-08-09 RX ADMIN — SODIUM CHLORIDE: 0.9 INJECTION, SOLUTION INTRAVENOUS at 08:08

## 2017-08-09 RX ADMIN — ERYTHROPOIETIN 10000 UNITS: 10000 INJECTION, SOLUTION INTRAVENOUS; SUBCUTANEOUS at 12:08

## 2017-08-09 RX ADMIN — CLONIDINE HYDROCHLORIDE 0.1 MG: 0.1 TABLET ORAL at 01:08

## 2017-08-09 NOTE — PROGRESS NOTES
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 She is stable on vancomycin with an echo suggestive of MV endocarditis (filament on MV; however, on atypical side of heart for infection) -- however, her failure to grow MRSA from blood cultures prior to receipt of vancomycin on repeat admission is inconsistent with this diagnosis (and valve abnormality seemingly not a true vegetation).    - would continue vancomycin with HD (pharmacy to assist in dosing)  - would consider readdressing need for JEFFREY with cardiology since I am uncertain what this "lesion" is and further information could possibly clarify more as to whether it is still present and seems infectious (would save her from a prolonged antibiotics course that would disrupt her home dialysis schedule for a shorter period of time and administer a shorter course of broad spectrum coverage that could further colonizer her with resistant christopher that pose a more long-term risk after possible KTx in the future if she undergoes listing)  - will continue to follow-up pending blood cx to ensure they remain " negative  - of note, patient will require parenteral antibiotics and will require TIW HD on discharge while on this antibiotics course (uncertain duration, pending possible JEFFREY)            Anticipated Disposition: pending improvement    Thank you for your consult. I will follow-up with patient. Please contact us if you have any additional questions.     Zarina Gilbert MD  Transplant ID Attending  [phone]    Zarina Gilbert MD  Infectious Disease  [facility]    Subjective:     Principal Problem:MRSA bacteremia    HPI: No notes on file  Interval History: Feels well. Afebrile. 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    Review of Systems   Constitutional: Negative for activity change, appetite change, chills, diaphoresis and fever.   HENT: Negative for ear pain, mouth sores, sinus pressure and sore throat.    Eyes: Negative for photophobia, pain and redness.   Respiratory: Negative for cough, shortness of breath and wheezing.    Cardiovascular: Negative for chest pain and leg swelling.   Gastrointestinal: Negative for abdominal distention, abdominal pain, diarrhea and nausea.   Genitourinary: Negative for dysuria, flank pain, frequency and urgency.   Musculoskeletal: Negative for arthralgias, back pain, gait problem and myalgias.   Skin: Negative for pallor and rash.   Neurological: Negative for dizziness, tremors, seizures and headaches.   Psychiatric/Behavioral: Negative for confusion.     Objective:     Vital Signs (Most Recent):  Temp: 98.1 °F (36.7 °C) (08/08/17 1548)  Pulse: 79 (08/08/17 1548)  Resp: 17 (08/08/17 1548)  BP: 119/70 (08/08/17 1548)  SpO2: 100 % (08/08/17 1548) Vital Signs (24h Range):  Temp:  [97.5 °F (36.4 °C)-98.2 °F (36.8 °C)] 98.1 °F (36.7 °C)  Pulse:  [79-88] 79  Resp:  [16-18] 17  SpO2:  [97 %-100 %] 100 %  BP: (119-149)/(60-91) 119/70     Weight: 59 kg  (130 lb)  Body mass index is 21.63 kg/m².    Estimated Creatinine Clearance: 10.8 mL/min (based on Cr of 7.1).    Physical Exam   Constitutional: She is oriented to person, place, and time. She appears well-developed and well-nourished. No distress.   HENT:   Head: Atraumatic.   Mouth/Throat: Oropharynx is clear and moist. No oropharyngeal exudate.   Eyes: Conjunctivae and EOM are normal. Pupils are equal, round, and reactive to light. No scleral icterus.   Neck: Neck supple.   Cardiovascular: Normal rate and regular rhythm.  Exam reveals no friction rub.    Murmur heard.  Pulmonary/Chest: Breath sounds normal. No respiratory distress. She has no wheezes. She has no rales. She exhibits no tenderness.   Abdominal: Soft. Bowel sounds are normal. She exhibits no distension. There is no tenderness. There is no rebound and no guarding.   Musculoskeletal: Normal range of motion. She exhibits no edema.   Lymphadenopathy:     She has no cervical adenopathy.   Neurological: She is alert and oriented to person, place, and time. No cranial nerve deficit. She exhibits normal muscle tone. Coordination normal.   Skin: No rash noted. No erythema.   AVF w/o erythema/drainage.       Significant Labs:   CBC:     Recent Labs  Lab 08/07/17  0455 08/08/17  0804   WBC 3.83* 3.62*   HGB 8.9* 9.7*   HCT 27.3* 29.3*   PLT 67* 77*     CMP:     Recent Labs  Lab 08/07/17  0455 08/08/17  0805    138   K 4.8 4.0    99   CO2 15* 29   GLU 92 91   BUN 63* 27*   CREATININE 11.3* 7.1*   CALCIUM 7.6* 7.9*   PROT 6.3 6.6   ALBUMIN 2.6* 2.7*   BILITOT 0.5 0.6   ALKPHOS 554* 582*   AST 43* 41*   ALT 39 35   ANIONGAP 12 10   EGFRNONAA 4.2* 7.3*       Significant Imaging: I have reviewed all pertinent imaging results/findings within the past 24 hours.     CXR: clear    2D echo:    1 - Moderate left atrial enlargement.     2 - Eccentric hypertrophy.     3 - Normal left ventricular systolic function (EF 60-65%).     4 - Normal left ventricular  diastolic function.     5 - Normal right ventricular systolic function .     6 - Mild mitral regurgitation.     7 - Filamentous structure attached to mitral valve (see details above).     Fistula U/S:  Color flow duplex exam reveals a patent left radiocephalic AV fistula. There is no evidence of a focal hemodynamically significant stenosis, despite elevated velocities noted at the anastomosis. Volume flow at mid bicep level measures 1254 ml./min.      Microbiology:  8/5 blood cx: MRSA x2  8/6 blood cx: negative x2  8/7 blood cx: negative x2

## 2017-08-09 NOTE — PLAN OF CARE
Problem: Patient Care Overview  Goal: Plan of Care Review  Outcome: Ongoing (interventions implemented as appropriate)  HD 3 hours 850 ml removed and tolerated well. Left upper arm fistula de-accessed and pressure held until hemostasis achieved. Procrit given with HD. Report given to Maine Griffith RN.

## 2017-08-09 NOTE — NURSING TRANSFER
Nursing Transfer Note      8/9/2017     Transfer from [facility] [address] to Dialysis     Transfer via stretcher     Transfer with none    Transported by staff     Medicines sent: yes AM meds     Chart send with patient: yes     Notified: family     Patient reassessed at: 8/9/17 at 0820

## 2017-08-09 NOTE — ASSESSMENT & PLAN NOTE
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 She is stable on vancomycin with an echo suggestive of MV endocarditis (filament on MV; however, on atypical side of heart for infection) -- however, her failure to grow MRSA from blood cultures prior to receipt of vancomycin on repeat admission is inconsistent with this diagnosis (and valve abnormality seemingly not a true vegetation).    - would continue vancomycin with HD (pharmacy to assist in dosing)  - would consider readdressing need for JEFFREY with cardiology since I am uncertain what this "lesion" is and further information could possibly clarify more as to whether it is still present and seems infectious (would save her from a prolonged antibiotics course that would disrupt her home dialysis schedule for a shorter period of time and administer a shorter course of broad spectrum coverage that could further colonizer her with resistant christopher that pose a more long-term risk after possible KTx in the future if she undergoes listing)  - will continue to follow-up pending blood cx to ensure they remain negative  - of note, patient will require parenteral antibiotics and will require TIW HD on discharge while on this antibiotics course (uncertain duration, pending possible JEFFREY)  "

## 2017-08-09 NOTE — PROGRESS NOTES
Pt discharged to home via wheelchair. Pt denies pain at the present time. Condition stable. Follows commands. IV removed and cathter intact. Pt given copy of discharge home care instructions. Pt verbalized understanding of activity limitations and s/s of when to call the Dr. Pt also given information on followup appointment. All belongings with the pt. Pt verbalized understanding of all discharge instructions. Pt family wheeling pt out.

## 2017-08-09 NOTE — HOSPITAL COURSE
"8/9: started on vancomycin, Cx comes back MRSA, repeated Cx in the next 2 days shoe no growth, patient is asymptomatic the whole duration of her hospital stay. Work up include US AV graft comes back normal with no signs of abscess or deep infection. TTE shows ''  There is mild mitral regurgitation. There is a tiny filamentous structure attached to the anterior mitral leaflet. It appears to have independant motion, but is on the ventricular side of the leaflet and therefore is not likely to be a vegetation. 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 D/W ID team again, '' we will treat with vanc for 6 weeks with follow up with ID in 2 weeks to re evaluate the need for complete 6 weeks or just 2 weeks for transit bacteremia. Likely need another TTE.   Also arranged [facility]. arranged with Dr Bass her nephrologist the IV ABx and the need for TID HD until she is done with Abx.  and [facility] complete fine tuning, appreciate efforts.            Review of Systems   Constitutional: Negative for appetite change, fatigue and unexpected weight change.        Generalized malaise   HENT: Negative for congestion, ear pain, sore throat and trouble swallowing.    Eyes: Negative for visual disturbance.   Respiratory: Negative for cough and shortness of breath.    Cardiovascular: Negative for " chest pain and leg swelling.   Gastrointestinal: Negative for abdominal distention, abdominal pain, constipation, diarrhea, nausea and vomiting.   Genitourinary: Negative for difficulty urinating, dysuria and hematuria.   Musculoskeletal: Negative for myalgias.   Skin: Negative for rash.   Neurological: Negative for dizziness, weakness, light-headedness and headaches.      Objective:      Vital Sign Min/Max (last 24 hours)     Value Min Max   Temp 96.4 °F (35.8 °C) 97.9 °F (36.6 °C)   Pulse 66 93   Resp 16 18   BP: Systolic 76 147   BP: Diastolic 44 94   SpO2 99 % 100 %       Weight: 59 kg (130 lb)  Body mass index is 21.63 kg/m².  No intake or output data in the 24 hours ending 08/08/17 1425   Physical Exam   Constitutional: She is oriented to person, place, and time. She appears well-developed and well-nourished.   HENT:   Head: Normocephalic and atraumatic.   Eyes: EOM are normal. Pupils are equal, round, and reactive to light.   Neck: No JVD present.   Cardiovascular: Normal rate, regular rhythm and intact distal pulses.    Murmur heard.   Systolic murmur is present with a grade of 2/6   Pulmonary/Chest: Effort normal and breath sounds normal. No respiratory distress.   Abdominal: Soft. Bowel sounds are normal. She exhibits no distension. There is no tenderness.   Musculoskeletal: She exhibits no edema.   AV fistula on left arm - has good thrill. No erythema, inflammation, TTP around site   Neurological: She is alert and oriented to person, place, and time.   Skin: Skin is warm and dry. Capillary refill takes less than 2 seconds.   Psychiatric: She has a normal mood and affect. Her behavior is normal. Judgment and thought content normal.   Vitals reviewed.

## 2017-08-09 NOTE — NURSING
Maintenance HD treatment initiated via Left forearm fistula using 15g buttonhole needles, lines secured

## 2017-08-09 NOTE — PLAN OF CARE
SW was informed by CM, Jessica Preciado, that Pt had home HD five days a week that was provided by [facility]. Pt is anticipated to need IV Vanc at discharge. SW placed call to [facility] and was told they would not be able to provide the IV Vanc. Pt is getting a JEFFREY tomorrow and Pt's length of need for the IV Vanc will be determined once this is completed. OFELIA spoke to Pasha with [facility] ([phone]) to ask about their ability to provide Pt's IV antibiotic. Pasha said that while Medicare does not cover the medication, they should cover supplies. However, they would need to know what if Pt's Nephrologist would be ok with Pt getting her IV antibiotic through her dialysis fistula because usually this is not allowed and Pt may need a PICC. OFELIA informed CM of this and asked that she discuss with Dr. Amor.   OFELIA sent initial referral information to [facility] via [facility] just so they could start looking at it. IV antibiotic orders will not be in until final ID recommendations are made. SW to continue to follow-up.     Loretta Silva LCSW

## 2017-08-09 NOTE — SUBJECTIVE & OBJECTIVE
Interval History: Feels well. Afebrile. 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    Review of Systems   Constitutional: Negative for activity change, appetite change, chills, diaphoresis and fever.   HENT: Negative for ear pain, mouth sores, sinus pressure and sore throat.    Eyes: Negative for photophobia, pain and redness.   Respiratory: Negative for cough, shortness of breath and wheezing.    Cardiovascular: Negative for chest pain and leg swelling.   Gastrointestinal: Negative for abdominal distention, abdominal pain, diarrhea and nausea.   Genitourinary: Negative for dysuria, flank pain, frequency and urgency.   Musculoskeletal: Negative for arthralgias, back pain, gait problem and myalgias.   Skin: Negative for pallor and rash.   Neurological: Negative for dizziness, tremors, seizures and headaches.   Psychiatric/Behavioral: Negative for confusion.     Objective:     Vital Signs (Most Recent):  Temp: 98.1 °F (36.7 °C) (08/08/17 1548)  Pulse: 79 (08/08/17 1548)  Resp: 17 (08/08/17 1548)  BP: 119/70 (08/08/17 1548)  SpO2: 100 % (08/08/17 1548) Vital Signs (24h Range):  Temp:  [97.5 °F (36.4 °C)-98.2 °F (36.8 °C)] 98.1 °F (36.7 °C)  Pulse:  [79-88] 79  Resp:  [16-18] 17  SpO2:  [97 %-100 %] 100 %  BP: (119-149)/(60-91) 119/70     Weight: 59 kg (130 lb)  Body mass index is 21.63 kg/m².    Estimated Creatinine Clearance: 10.8 mL/min (based on Cr of 7.1).    Physical Exam   Constitutional: She is oriented to person, place, and time. She appears well-developed and well-nourished. No distress.   HENT:   Head: Atraumatic.   Mouth/Throat: Oropharynx is clear and moist. No oropharyngeal exudate.   Eyes: Conjunctivae and EOM are normal. Pupils are equal, round, and reactive to light. No scleral icterus.   Neck: Neck supple.   Cardiovascular: Normal rate and regular rhythm.  Exam reveals no friction rub.     Murmur heard.  Pulmonary/Chest: Breath sounds normal. No respiratory distress. She has no wheezes. She has no rales. She exhibits no tenderness.   Abdominal: Soft. Bowel sounds are normal. She exhibits no distension. There is no tenderness. There is no rebound and no guarding.   Musculoskeletal: Normal range of motion. She exhibits no edema.   Lymphadenopathy:     She has no cervical adenopathy.   Neurological: She is alert and oriented to person, place, and time. No cranial nerve deficit. She exhibits normal muscle tone. Coordination normal.   Skin: No rash noted. No erythema.   AVF w/o erythema/drainage.       Significant Labs:   CBC:     Recent Labs  Lab 08/07/17  0455 08/08/17  0804   WBC 3.83* 3.62*   HGB 8.9* 9.7*   HCT 27.3* 29.3*   PLT 67* 77*     CMP:     Recent Labs  Lab 08/07/17  0455 08/08/17  0805    138   K 4.8 4.0    99   CO2 15* 29   GLU 92 91   BUN 63* 27*   CREATININE 11.3* 7.1*   CALCIUM 7.6* 7.9*   PROT 6.3 6.6   ALBUMIN 2.6* 2.7*   BILITOT 0.5 0.6   ALKPHOS 554* 582*   AST 43* 41*   ALT 39 35   ANIONGAP 12 10   EGFRNONAA 4.2* 7.3*       Significant Imaging: I have reviewed all pertinent imaging results/findings within the past 24 hours.     CXR: clear    2D echo:    1 - Moderate left atrial enlargement.     2 - Eccentric hypertrophy.     3 - Normal left ventricular systolic function (EF 60-65%).     4 - Normal left ventricular diastolic function.     5 - Normal right ventricular systolic function .     6 - Mild mitral regurgitation.     7 - Filamentous structure attached to mitral valve (see details above).     Fistula U/S:  Color flow duplex exam reveals a patent left radiocephalic AV fistula. There is no evidence of a focal hemodynamically significant stenosis, despite elevated velocities noted at the anastomosis. Volume flow at mid bicep level measures 1254 ml./min.      Microbiology:  8/5 blood cx: MRSA x2  8/6 blood cx: negative x2  8/7 blood cx: negative x2

## 2017-08-09 NOTE — PLAN OF CARE
Problem: Patient Care Overview  Goal: Plan of Care Review  Outcome: Ongoing (interventions implemented as appropriate)  Pt AAO x 3 Pt denies pain. No abdominal discomfort. Renal diet maintained. Contact precautions followed Whiteboard updated. Pt Q 2 hour rounding for pain and safety followed.

## 2017-08-10 ENCOUNTER — TELEPHONE (OUTPATIENT)
Dept: TRANSPLANT | Facility: CLINIC | Age: 27
End: 2017-08-10

## 2017-08-10 NOTE — TELEPHONE ENCOUNTER
Spoke with pt, states she feels better.  She is aware of follow-up appointments.  She requested to hold off on repeat labs until she sees Dr. Choi on 8/28/17.

## 2017-08-10 NOTE — ASSESSMENT & PLAN NOTE
Bacteremia 4/4 blood cultures from 08/05/2017, sensitivities pending  [facility] no [facility] 2 [facility]; 08/07 Bcx no growth 1 day  -L arm fistula patent without evidence of thrombus or stenosis  -TTE not ordered as ID believes a vegetartion to be unlikley  -VAS u/S of L arm graft was patent without stenosis  -Renally dosing vanc, CrCl 4.5; given 750mg Sunday; random vanc level 12.6 yesterday; received 1g yesterday s/p HD with a random level this AM of 26; consulted pharmD to assist with dosing  -ID to assist with dosing outpatient, will f/u their recs

## 2017-08-10 NOTE — ASSESSMENT & PLAN NOTE
Home HD schedule is Sun, Mon, Tue, Thu & Sat  -Will need to be adjusted to 3x weekly while patient is receiving parenteral antibiotics per ID and will need to remain on HD 3x weekly in order to receive vanc dosing with her schedule at home  -Last HD today

## 2017-08-10 NOTE — DISCHARGE SUMMARY
[facility] Hospital Medicine  Discharge Summary      Patient Name: Holly Patel  MRN: [id number]  Admission Date: 8/6/2017  Hospital Length of Stay: 3 days  Discharge Date and Time:  08/09/2017 7:06 PM  Attending Physician: No att. providers found   Discharging Provider: TRISHA Wells  Primary Care Provider: [facility] Medicine Team: [facility] 4 TRISHA Wells    HPI:   Holly Patel is a 26 y.o. F with HTN, hemangioendothelioma s/p LTx (1992), ESRD on home hemodialysis (still makes urine), and secondary PTH who presented to the ED on 08/04/2017 c/o fever x1 day. She was worked up for infectious cause, but was pan negative on workup and it was decided that etiology was likely viral and she was sent home. Later on after discharge, blood cultures returned 4/4 positive for gram positive rods and the patient was contacted and told to come back to the ED. She states that she felt better at that time and would report to the hospital in the morning. On readmission, she was started on vancomycin and then transferred to the floor. Denies recent travel. She has not had similar symptoms, no prior h/o UTI. She denies any n/v/d, cough, sinus congestion, change in appetite, dysuria, urgency in last few or any sick contacts.    Procedure(s) (LRB):  TRANSESOPHAGEAL ECHOCARDIOGRAM (JEFFREY) (N/A)      Indwelling Lines/Drains at time of discharge:   Lines/Drains/Airways     Drain                 Hemodialysis AV Fistula Left forearm 301[address] days              Hospital Course:   8/9: started on vancomycin, Cx comes back MRSA, repeated Cx in the next 2 days shoe no growth, patient is asymptomatic the whole duration of her hospital stay. Work up include US AV graft comes back normal with no signs of abscess or deep infection. TTE shows ''  There is mild mitral regurgitation. There is a tiny filamentous structure attached to the anterior mitral  "leaflet. It appears to have independant motion, but is on the ventricular side of the leaflet and therefore is not likely to be a vegetation. 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 D/W ID team again, '' we will treat with vanc for 6 weeks with follow up with ID in 2 weeks to re evaluate the need for complete 6 weeks or just 2 weeks for transit bacteremia. Likely need another TTE.   Also arranged for priority clinic next week. arranged with Dr Bass her nephrologist the IV ABx and the need for TID HD until she is done with Abx.  and care point staff complete fine tuning, appreciate efforts.            Review of Systems   Constitutional: Negative for appetite change, fatigue and unexpected weight change.        Generalized malaise   HENT: Negative for congestion, ear pain, sore throat and trouble swallowing.    Eyes: Negative for visual disturbance.   Respiratory: Negative for cough and shortness of breath.    Cardiovascular: Negative for chest pain and leg swelling.   Gastrointestinal: Negative for abdominal distention, abdominal pain, constipation, diarrhea, nausea and vomiting.   Genitourinary: Negative for difficulty urinating, dysuria and hematuria.   Musculoskeletal: Negative for myalgias.   Skin: Negative for rash.   Neurological: Negative for dizziness, weakness, light-headedness and headaches. "      Objective:      Vital Sign Min/Max (last 24 hours)     Value Min Max   Temp 96.4 °F (35.8 °C) 97.9 °F (36.6 °C)   Pulse 66 93   Resp 16 18   BP: Systolic 76 147   BP: Diastolic 44 94   SpO2 99 % 100 %       Weight: 59 kg (130 lb)  Body mass index is 21.63 kg/m².  No intake or output data in the 24 hours ending 08/08/17 1425   Physical Exam   Constitutional: She is oriented to person, place, and time. She appears well-developed and well-nourished.   HENT:   Head: Normocephalic and atraumatic.   Eyes: EOM are normal. Pupils are equal, round, and reactive to light.   Neck: No JVD present.   Cardiovascular: Normal rate, regular rhythm and intact distal pulses.    Murmur heard.   Systolic murmur is present with a grade of 2/6   Pulmonary/Chest: Effort normal and breath sounds normal. No respiratory distress.   Abdominal: Soft. Bowel sounds are normal. She exhibits no distension. There is no tenderness.   Musculoskeletal: She exhibits no edema.   AV fistula on left arm - has good thrill. No erythema, inflammation, TTP around site   Neurological: She is alert and oriented to person, place, and time.   Skin: Skin is warm and dry. Capillary refill takes less than 2 seconds.   Psychiatric: She has a normal mood and affect. Her behavior is normal. Judgment and thought content normal.   Vitals reviewed.          Consults:   Consults         Status Ordering Provider     Inpatient consult to Infectious Diseases  Once     Provider:  (Not yet assigned)    LUMA Velarde     Inpatient consult to Nephrology  Once     Provider:  (Not yet assigned)    NEENA Giraldo     Inpatient consult to Nephrology  Once     Provider:  (Not yet assigned)    NEENA Giraldo          Significant Diagnostic Studies: Labs:   BMP:   Recent Labs  Lab 08/08/17  0457 08/08/17  0805 08/09/17  0506   GLU  --  91 97   NA  --  138 137   K  --  4.0 4.0   CL  --  99 100   CO2  --  29 27   BUN  --  27* 34*   CREATININE  --   "7.1* 8.5*   CALCIUM  --  7.9* 8.2*   MG 1.8  --  1.8   , CMP   Recent Labs  Lab 08/08/17  0805 08/09/17  0506    137   K 4.0 4.0   CL 99 100   CO2 29 27   GLU 91 97   BUN 27* 34*   CREATININE 7.1* 8.5*   CALCIUM 7.9* 8.2*   PROT 6.6 6.8   ALBUMIN 2.7* 2.9*   BILITOT 0.6 0.6   ALKPHOS 582* 588*   AST 41* 37   ALT 35 35   ANIONGAP 10 10   ESTGFRAFRICA 8.4* 6.8*   EGFRNONAA 7.3* 5.9*    and CBC   Recent Labs  Lab 08/08/17  0804 08/09/17  0506   WBC 3.62* 3.86*   HGB 9.7* 9.9*   HCT 29.3* 29.6*   PLT 77* 92*     Microbiology:   Blood Culture   Lab Results   Component Value Date    LABBLOO No Growth to date 08/07/2017    LABBLOO No Growth to date 08/07/2017    LABBLOO No Growth to date 08/07/2017     Blood culture #2 [[id number]]  Collected: 08/05/17 0211   Order Status: Completed Specimen: Blood from Peripheral, Hand, Right Updated: 08/07/17 0959    Blood Culture, Routine Gram stain aer bottle: Gram positive cocci in clusters resembling Staph     Blood Culture, Routine Gram stain sachin bottle: Gram positive cocci in clusters resembling Staph     Blood Culture, Routine Results called to and read back by:DR Scruggs   08/05/2017  22:30    Blood Culture, Routine --    METHICILLIN RESISTANT STAPHYLOCOCCUS AUREUS   ID consult required at [facility].   ID consult required at [facility].    Narrative:     Blood Culture #2   Susceptibility      Methicillin resistant staphylococcus aureus     CULTURE, BLOOD     Clindamycin <=0.5 "><=0.5  Sensitive     Erythromycin >4  Resistant     Oxacillin >2  Resistant     Penicillin 8  Resistant     Tetracycline <=4 "><=4  Sensitive     Trimeth/Sulfa <=0.5/9.5 "><=0.5/9.5  Sensitive     Vancomycin 1  Sensitive                 Blood Culture #1 **CANNOT BE ORDERED STAT** [[id number]]  Collected: 08/05/17 0201   Order Status: Completed Specimen: Blood from Peripheral, Antecubital, Right Updated: 08/07/17 0958    Blood Culture, Routine Gram stain " "aer bottle: Gram positive cocci in clusters resembling Staph     Blood Culture, Routine Gram stain sachin bottle: Gram positive cocci in clusters resembling Staph     Blood Culture, Routine Results called to and read back by:DR Scruggs   2017  22:30    Blood Culture, Routine --    METHICILLIN RESISTANT STAPHYLOCOCCUS AUREUS   ID consult required at [facility].   ID consult required at [facility] and [facility] locations.    Susceptibility      Methicillin resistant staphylococcus aureus     CULTURE, BLOOD     Clindamycin <=0.5 "><=0.5  Sensitive     Erythromycin >4  Resistant     Oxacillin >2  Resistant     Penicillin >8  Resistant     Tetracycline <=4 "><=4  Sensitive     Trimeth/Sulfa <=0.5/9.5 "><=0.5/9.5  Sensitive     Vancomycin 2  Sensitive                 Rapid strep screen [[id number]]              Radiology:    PAT NAME: SYEDA BANKS  MED REC#: [id number]  :      1990  SEX:      F  EXAM ADRIENNE: 2017 12:27  REF PHYS: SELF, AAAREFERRAL      Indication:  ESRD on dialysis.  Results:  Dialysis Access: Left Arm Radiocephalic fistula                    PSV [cm/s]        Flow Volume [ml/min]  ________________________________________________________________  Proximal to Anastomosis (artery)    260               -  Anastomosis       521               -  Proximal AVF      333               -  Middle AVF        89                1254  Distal AVF        77                -    Report Summary:  Impression:   Color flow duplex exam reveals a patent left radiocephalic AV fistula. There is no evidence of a focal hemodynamically significant stenosis, despite elevated velocities noted at the anastomosis. Volume flow at mid bicep level measures 1254 ml./min.    Sonographer: Roberto Stock RVT    Electronically Signed by:  Kennedy Wilcox MD [[id number]]                        On: 2017 13:07      Pending Diagnostic Studies:     None        Final Active Diagnoses:    Diagnosis Date Noted POA "    PRINCIPAL PROBLEM:  MRSA bacteremia [R78.81] 08/06/2017 Yes    Secondary hyperparathyroidism [N25.81] 08/05/2017 Yes    Immunosuppressed [D89.9] 08/05/2017 Yes    Thrombocytopenia [D69.6] 04/12/2016 Yes    Chronic rejection of liver transplant [T86.41] 03/22/2016 Yes    Anemia in ESRD (end-stage renal disease) [N18.6, D63.1] 10/12/2015 Yes     Chronic    Renovascular hypertension [I15.0] 10/02/2015 Yes    ESRD on hemodialysis [N18.6, Z99.2] 09/30/2015 Not Applicable     Chronic    Hypertensive urgency, malignant [I16.0] 10/06/2014 Yes    Prophylactic immunotherapy [Z29.8] 08/04/2014 Not Applicable   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 [Z94.4] 09/10/2012 Not Applicable     Chronic      Problems Resolved During this Admission:    Diagnosis Date Noted Date Resolved POA      Secondary hyperparathyroidism    2/2 ESRD            Immunosuppressed    S/p liver transplant in 1992  -Prograf at home          Thrombocytopenia    77 currently which is baseline          Chronic rejection of liver transplant    Prograf           Anemia in ESRD (end-stage renal disease)    Stable and at baseline in this ESRD patient on HD  -Hgb 9.7 today which is baseline, will monitor for changes          Renovascular hypertension              ESRD on hemodialysis    Home HD schedule is Sun, Mon, Tue, Thu & Sat  -Will need to be adjusted to 3x weekly while patient is receiving parenteral antibiotics per ID and will need to remain on HD 3x weekly in order to receive vanc dosing with her schedule at home  -Last HD today             Hypertensive urgency, malignant    Normotensive after reinitiating of home meds  Asymptomatic and no overt signs of end-organ damage at this time  - continue home BP meds amlodipine, clonidine and labetalol.             Prophylactic immunotherapy              Liver replaced by transplant    Transplanted in 1992 for hemangioendothelioma; patient is on prograf & prednisone at home  -Prednisone every other day while  in hospital; next dose 08/07/2017  -Prograf held in light of infection                * MRSA bacteremia    Bacteremia 4/4 blood cultures from 08/05/2017, sensitivities pending  Blood cultures from [facility] -->08/06 Bcx no growth 2 days; 08/07 Bcx no growth 1 day  -L arm fistula patent without evidence of thrombus or stenosis  -TTE not ordered as ID believes a vegetartion to be unlikley  -VAS u/S of L arm graft was patent without stenosis  -Renally dosing vanc, CrCl 4.5; given 750mg Sunday; random vanc level 12.6 yesterday; received 1g yesterday s/p HD with a random level this AM of 26; consulted pharmD to assist with dosing  -ID to assist with dosing outpatient, will f/u their recs                  Discharged Condition: good    Disposition: Home or Self Care    Follow Up:  Follow-up Information     [facility] - Nephrology On 8/30/2017.    Specialty:  Nephrology  Why:  Nephrology follow up with Dr. Bass on Wednesday 8/30 @ 1pm  Contact information:  [address] [facility] [address] [phone]  Additional information:  2nd Floor           Call [facility].    Why:  As needed           [facility] On 8/16/2017.    Why:  Hospital Follow-up Wednesday 8/16 @ 11am  Contact information:  [address]  [facility] [id number] [phone]             [facility] - Infectious Diseases On 8/28/2017.    Specialty:  Infectious Diseases  Why:  Hospital Follow-up with Dr Choi Monday 8/28 @ 9:30am for need follow up in 2 weeks from now with ECHO. ,   Contact information:  [address]  [facility] [address] [phone]  Additional information:  1st Floor - [facility]               Patient Instructions:     VANCOMYCIN, TROUGH   Standing Status: Future  Standing Exp. Date: 10/08/18     VANCOMYCIN, TROUGH   Standing Status: Future  Standing Exp. Date: 08/15/18       Medications:  Reconciled Home Medications:   Discharge Medication List as of 8/9/2017  3:07 PM       START taking these medications    Details   VANCOMYCIN HCL (VANCOMYCIN 1 G/250 ML D5W, READY TO MIX SYSTEM,) Inject 125 mLs (500 mg total) into the vein Every 3 (three) days., Starting Wed 8/9/2017, Until Wed 9/20/2017, No Print         CONTINUE these medications which have NOT CHANGED    Details   amlodipine (NORVASC) 10 MG tablet Take 1 tablet (10 mg total) by mouth once daily., Starting 11/11/2014, Until Discontinued, Normal      cinacalcet (SENSIPAR) 60 MG Tab Take 1 tablet (60 mg total) by mouth daily with breakfast., Starting Tue 8/1/2017, No Print      labetalol (NORMODYNE) 200 MG tablet Take 1 tablet (200 mg total) by mouth 2 (two) times daily., Starting 2/22/2017, Until Discontinued, Normal      predniSONE (DELTASONE) 1 MG tablet Take 1 mg by mouth every other day., Until Discontinued, Historical Med      tacrolimus (PROGRAF) 1 MG Cap Take 8 capsules (8 mg total) by mouth every 12 (twelve) hours., Starting Wed 7/12/2017, Until Thu 7/12/2018, Normal      triamcinolone acetonide 0.1% (KENALOG) 0.1 % ointment AAA on arms, legs, and neck bid x 1-2 wks then prn flares only, Normal         STOP taking these medications       cloNIDine (CATAPRES) 0.1 MG tablet Comments:   Reason for Stopping:                     TRISHA Wells  Department of Hospital Medicine  [facility]

## 2017-08-10 NOTE — PLAN OF CARE
Pt d/c to home w/ Rx for IV abx provided by CPP to dialysis center.     08/10/17 1340   Final Note   Assessment Type Final Discharge Note   Discharge Disposition IV Therapy   What phone number can be called within the next 1-3 days to see how you are doing after discharge? [phone]   Discharge/Hospital Encounter Summary to ([facility]) PCP Yes  (faxed D/C Summary to PCP 8/10)   Referral to / orders for Home Health Complete? n/a   Right Care Referral Info   Post Acute Recommendation Other   Referral Type infusion therapy   [facility]

## 2017-08-11 ENCOUNTER — PATIENT OUTREACH (OUTPATIENT)
Dept: ADMINISTRATIVE | Facility: CLINIC | Age: 27
End: 2017-08-11

## 2017-08-11 LAB
BACTERIA BLD CULT: NORMAL
BACTERIA BLD CULT: NORMAL

## 2017-08-11 NOTE — ASSESSMENT & PLAN NOTE
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 She is stable on vancomycin with an echo suggestive of MV endocarditis (filament on MV; however, on atypical side of heart for infection) -- however, her failure to grow MRSA from blood cultures prior to receipt of vancomycin on repeat admission is inconsistent with this diagnosis (and valve abnormality seemingly not a true vegetation). JEFFREY was further recommended to define the origin of this lesion and its persistence but the patient refused at this time. Given the possible risk of severe decompensation in the setting of immunosuppression should this lesion become infected, will commit her to a prolonged antibiotics course at this time with ultimate duration to be determined by her outpatient provider in clinic based on repeat echocardiogram and clinical course. She is stable for discharge.    - would continue vancomycin with HD for an anticipated 6wks maximum (stop date to be determined in ID clinic)  - I have requested a follow-up appointment in ID clinic for ~2wks from now for repeat TTE to reassess this lesion and determine need for additional antibiotic therapy   - of note, patient will require TIW HD on discharge while on this antibiotics course (uncertain duration, home HD with IV vanc not possible)  - weekly labs (CBC w/diff, BMP, and vanc trough) should be faxed to [facility]

## 2017-08-11 NOTE — PATIENT INSTRUCTIONS
Bacteremia, Suspected (Adult)  Your fever today is probably due to a viral illness. However, sometimes fever can be an early sign of a more serious bacterial infection. Bacteremia is a bacterial infection that has spread to the bloodstream. This is serious because it can spread to other organs, including the kidneys, brain, and lungs.  Your healthcare provider will perform tests (cultures) to check for bacteremia. Until the test results are known you should watch for the signs listed below.  Causes  Bacteremia usually starts with a typical infection, but it then spreads to the blood. Almost any type of infection can cause bacteremia, including a urinary tract infection, skin infection, gastrointestinal problem, surgical complication, or pneumonia.  Symptoms  At first symptoms may seem like any typical infection or illness, but then they worsen. Symptoms of bacteremia can include:  · Fever and chills  · Loss of appetite  · Nausea or vomiting  · Trouble breathing or fast breathing  · Fast heart rate  · Feeling lightheaded or faint  · Skin rashes or blotches  Home care  Follow these guidelines when caring for yourself at home.  · Rest at home for the first 2 to 3 days. When resuming activity, don't let yourself become overly tired.  · You can take acetaminophen or ibuprofen for pain, unless you were given a different pain medicine to use. (Note: If you have chronic liver or kidney disease or have ever had a stomach ulcer or gastrointestinal bleeding, talk with your healthcare provider before using these medicines. Also talk to your provider if you are taking medicine to prevent blood clots.) Aspirin should never be given to anyone younger than 18 years of age who is ill with a viral infection or fever. It may cause severe liver or brain damage.  · If you were given antibiotics, take them until they are used up, or your healthcare provider tells you to stop. It is important to finish the antibiotics even though you  feel better. This is to make sure the infection has cleared.  · Your appetite may be poor, so a light diet is fine. Avoid dehydration by drinking 6 to 8 glasses of fluid per day (such as water, soft drinks, sports drinks, juices, tea, or soup).  Follow-up care  Follow up with your healthcare provider, or as advised.  · If a culture was done, you will be notified if your treatment needs to be changed. You can call as directed for the results.  · If X-rays, a CT, or an ultrasound were done, a specialist will review them. You will be notified of any findings that may affect your care.  Call 911  Contact emergency services right away if any of these occur:  · Trouble breathing or swallowing, or wheezing  · Chest pain  · Confusion or sudden change in behavior  · Extreme drowsiness or trouble awakening  · Fainting or loss of consciousness  · Rapid heart rate  · Low blood pressure  · Vomiting blood, or large amounts of blood in stool  · Seizure  When to seek medical advice  Call your healthcare provider right away if any of these occur:  · Cough with lots of colored sputum (mucus), or blood in your sputum  · Severe headache  · Severe face, neck, throat, or ear pain  · Pain in the right lower abdomen  · Weakness, dizziness, repeated vomiting, or diarrhea  · Joint pain or a new rash  · Burning when urinating  · Fever of 100.4°F (38°C) or higher  Date Last Reviewed: 7/30/2015  © 7376-5054 [employer]. [address] [place] [address], [place] [address]. All rights reserved. This information is not intended as a substitute for professional medical care. Always follow your healthcare professional's instructions.

## 2017-08-11 NOTE — SUBJECTIVE & OBJECTIVE
Interval History: Feels well. Afebrile. Curiously, no growth still of MRSA from blood cx from repeat admission prior to receipt of vancomycin -- this finding remains inconsistent with MRSA endocarditis. Discussed with patient that mass on valve is of uncertain origin -- JEFFREY recommended to further clarify but patient refused.    Review of Systems   Constitutional: Negative for activity change, appetite change, chills, diaphoresis and fever.   HENT: Negative for ear pain, mouth sores, sinus pressure and sore throat.    Eyes: Negative for photophobia, pain and redness.   Respiratory: Negative for cough, shortness of breath and wheezing.    Cardiovascular: Negative for chest pain and leg swelling.   Gastrointestinal: Negative for abdominal distention, abdominal pain, diarrhea and nausea.   Genitourinary: Negative for dysuria, flank pain, frequency and urgency.   Musculoskeletal: Negative for arthralgias, back pain, gait problem and myalgias.   Skin: Negative for pallor and rash.   Neurological: Negative for dizziness, tremors, seizures and headaches.   Psychiatric/Behavioral: Negative for confusion.     Objective:     Vital Signs (Most Recent):  Temp: 97.9 °F (36.6 °C) (08/09/17 1300)  Pulse: 83 (08/09/17 1300)  Resp: 18 (08/09/17 1300)  BP: (!) 147/90 (08/09/17 1300)  SpO2: 100 % (08/09/17 1300) Vital Signs (24h Range):        Weight: 59 kg (130 lb)  Body mass index is 21.63 kg/m².    Estimated Creatinine Clearance: 9 mL/min (based on Cr of 8.5).    Physical Exam   Constitutional: She is oriented to person, place, and time. She appears well-developed and well-nourished. No distress.   HENT:   Head: Atraumatic.   Mouth/Throat: Oropharynx is clear and moist. No oropharyngeal exudate.   Eyes: Conjunctivae and EOM are normal. Pupils are equal, round, and reactive to light. No scleral icterus.   Neck: Neck supple.   Cardiovascular: Normal rate and regular rhythm.  Exam reveals no friction rub.    Murmur  heard.  Pulmonary/Chest: Breath sounds normal. No respiratory distress. She has no wheezes. She has no rales. She exhibits no tenderness.   Abdominal: Soft. Bowel sounds are normal. She exhibits no distension. There is no tenderness. There is no rebound and no guarding.   Musculoskeletal: Normal range of motion. She exhibits no edema.   Lymphadenopathy:     She has no cervical adenopathy.   Neurological: She is alert and oriented to person, place, and time. No cranial nerve deficit. She exhibits normal muscle tone. Coordination normal.   Skin: No rash noted. No erythema.   AVF w/o erythema/drainage.       Significant Labs:   CBC:     Recent Labs  Lab 08/09/17  0506   WBC 3.86*   HGB 9.9*   HCT 29.6*   PLT 92*     CMP:     Recent Labs  Lab 08/09/17  0506      K 4.0      CO2 27   GLU 97   BUN 34*   CREATININE 8.5*   CALCIUM 8.2*   PROT 6.8   ALBUMIN 2.9*   BILITOT 0.6   ALKPHOS 588*   AST 37   ALT 35   ANIONGAP 10   EGFRNONAA 5.9*       Significant Imaging: I have reviewed all pertinent imaging results/findings within the past 24 hours.     CXR: clear    2D echo:    1 - Moderate left atrial enlargement.     2 - Eccentric hypertrophy.     3 - Normal left ventricular systolic function (EF 60-65%).     4 - Normal left ventricular diastolic function.     5 - Normal right ventricular systolic function .     6 - Mild mitral regurgitation.     7 - Filamentous structure attached to mitral valve (see details above).     Fistula U/S:  Color flow duplex exam reveals a patent left radiocephalic AV fistula. There is no evidence of a focal hemodynamically significant stenosis, despite elevated velocities noted at the anastomosis. Volume flow at mid bicep level measures 1254 ml./min.      Microbiology:  8/5 blood cx: MRSA x2  8/6 blood cx: negative x2  8/7 blood cx: negative x2

## 2017-08-11 NOTE — PROGRESS NOTES
[facility]  Infectious Disease  Progress Note    Patient Name: Holly Patel  MRN: [id number]  Admission Date: 8/6/2017  Length of Stay: 3 days  Attending Physician: No att. 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 She is stable on vancomycin with an echo suggestive of MV endocarditis (filament on MV; however, on atypical side of heart for infection) -- however, her failure to grow MRSA from blood cultures prior to receipt of vancomycin on repeat admission is inconsistent with this diagnosis (and valve abnormality seemingly not a true vegetation). JEFFREY was further recommended to define the origin of this lesion and its persistence but the patient refused at this time. Given the possible risk of severe decompensation in the setting of immunosuppression should this lesion become infected, will commit her to a prolonged antibiotics course at this time with ultimate duration to be determined by her outpatient provider in clinic based on repeat echocardiogram and clinical course. She is stable for discharge.    - would continue vancomycin with HD for an anticipated 6wks maximum (stop date to be determined in ID clinic)  - I have requested a follow-up appointment in ID clinic for ~2wks from now for repeat TTE  to reassess this lesion and determine need for additional antibiotic therapy   - of note, patient will require TIW HD on discharge while on this antibiotics course (uncertain duration, home HD with IV vanc not possible)  - weekly labs (CBC w/diff, BMP, and vanc trough) should be faxed to the [facility] ID clinic            Anticipated Disposition: per primary team    Thank you for your consult. I will sign off. Please contact us if you have any additional questions.     Zarina Gilbert MD  Transplant ID Attending  [phone]    Zarina Gilbert MD  Infectious Disease  [facility]    Subjective:     Principal Problem:MRSA bacteremia    HPI: No notes on file  Interval History: Feels well. Afebrile. Curiously, no growth still of MRSA from blood cx from repeat admission prior to receipt of vancomycin -- this finding remains inconsistent with MRSA endocarditis. Discussed with patient that mass on valve is of uncertain origin -- JEFFREY recommended to further clarify but patient refused.    Review of Systems   Constitutional: Negative for activity change, appetite change, chills, diaphoresis and fever.   HENT: Negative for ear pain, mouth sores, sinus pressure and sore throat.    Eyes: Negative for photophobia, pain and redness.   Respiratory: Negative for cough, shortness of breath and wheezing.    Cardiovascular: Negative for chest pain and leg swelling.   Gastrointestinal: Negative for abdominal distention, abdominal pain, diarrhea and nausea.   Genitourinary: Negative for dysuria, flank pain, frequency and urgency.   Musculoskeletal: Negative for arthralgias, back pain, gait problem and myalgias.   Skin: Negative for pallor and rash.   Neurological: Negative for dizziness, tremors, seizures and headaches.   Psychiatric/Behavioral: Negative for confusion.     Objective:     Vital Signs (Most Recent):  Temp: 97.9 °F (36.6 °C) (08/09/17 1300)  Pulse: 83 (08/09/17 1300)  Resp: 18 (08/09/17 1300)  BP: (!) 147/90  (08/09/17 1300)  SpO2: 100 % (08/09/17 1300) Vital Signs (24h Range):        Weight: 59 kg (130 lb)  Body mass index is 21.63 kg/m².    Estimated Creatinine Clearance: 9 mL/min (based on Cr of 8.5).    Physical Exam   Constitutional: She is oriented to person, place, and time. She appears well-developed and well-nourished. No distress.   HENT:   Head: Atraumatic.   Mouth/Throat: Oropharynx is clear and moist. No oropharyngeal exudate.   Eyes: Conjunctivae and EOM are normal. Pupils are equal, round, and reactive to light. No scleral icterus.   Neck: Neck supple.   Cardiovascular: Normal rate and regular rhythm.  Exam reveals no friction rub.    Murmur heard.  Pulmonary/Chest: Breath sounds normal. No respiratory distress. She has no wheezes. She has no rales. She exhibits no tenderness.   Abdominal: Soft. Bowel sounds are normal. She exhibits no distension. There is no tenderness. There is no rebound and no guarding.   Musculoskeletal: Normal range of motion. She exhibits no edema.   Lymphadenopathy:     She has no cervical adenopathy.   Neurological: She is alert and oriented to person, place, and time. No cranial nerve deficit. She exhibits normal muscle tone. Coordination normal.   Skin: No rash noted. No erythema.   AVF w/o erythema/drainage.       Significant Labs:   CBC:     Recent Labs  Lab 08/09/17  0506   WBC 3.86*   HGB 9.9*   HCT 29.6*   PLT 92*     CMP:     Recent Labs  Lab 08/09/17  0506      K 4.0      CO2 27   GLU 97   BUN 34*   CREATININE 8.5*   CALCIUM 8.2*   PROT 6.8   ALBUMIN 2.9*   BILITOT 0.6   ALKPHOS 588*   AST 37   ALT 35   ANIONGAP 10   EGFRNONAA 5.9*       Significant Imaging: I have reviewed all pertinent imaging results/findings within the past 24 hours.     CXR: clear    2D echo:    1 - Moderate left atrial enlargement.     2 - Eccentric hypertrophy.     3 - Normal left ventricular systolic function (EF 60-65%).     4 - Normal left ventricular diastolic function.     5 -  Normal right ventricular systolic function .     6 - Mild mitral regurgitation.     7 - Filamentous structure attached to mitral valve (see details above).     Fistula U/S:  Color flow duplex exam reveals a patent left radiocephalic AV fistula. There is no evidence of a focal hemodynamically significant stenosis, despite elevated velocities noted at the anastomosis. Volume flow at mid bicep level measures 1254 ml./min.      Microbiology:  8/5 blood cx: MRSA x2  8/6 blood cx: negative x2  8/7 blood cx: negative x2

## 2017-08-12 LAB
BACTERIA BLD CULT: NORMAL
BACTERIA BLD CULT: NORMAL

## 2017-08-16 ENCOUNTER — OFFICE VISIT (OUTPATIENT)
Dept: PRIMARY CARE CLINIC | Facility: CLINIC | Age: 27
End: 2017-08-16
Payer: MEDICARE

## 2017-08-16 VITALS
OXYGEN SATURATION: 96 % | DIASTOLIC BLOOD PRESSURE: 75 MMHG | BODY MASS INDEX: 20.9 KG/M2 | WEIGHT: 125.44 LBS | HEART RATE: 91 BPM | HEIGHT: 65 IN | SYSTOLIC BLOOD PRESSURE: 138 MMHG

## 2017-08-16 DIAGNOSIS — R78.81 MRSA BACTEREMIA: Primary | ICD-10-CM

## 2017-08-16 DIAGNOSIS — N18.6 ESRD ON HEMODIALYSIS: Chronic | ICD-10-CM

## 2017-08-16 DIAGNOSIS — D63.1 ANEMIA IN ESRD (END-STAGE RENAL DISEASE): Chronic | ICD-10-CM

## 2017-08-16 DIAGNOSIS — N25.81 SECONDARY HYPERPARATHYROIDISM: ICD-10-CM

## 2017-08-16 DIAGNOSIS — D69.6 THROMBOCYTOPENIA: ICD-10-CM

## 2017-08-16 DIAGNOSIS — I15.0 RENOVASCULAR HYPERTENSION: ICD-10-CM

## 2017-08-16 DIAGNOSIS — B95.62 MRSA BACTEREMIA: Primary | ICD-10-CM

## 2017-08-16 DIAGNOSIS — D50.8 IRON DEFICIENCY ANEMIA SECONDARY TO INADEQUATE DIETARY IRON INTAKE: Chronic | ICD-10-CM

## 2017-08-16 DIAGNOSIS — Z99.2 ESRD ON HEMODIALYSIS: Chronic | ICD-10-CM

## 2017-08-16 DIAGNOSIS — D84.9 IMMUNOSUPPRESSION: ICD-10-CM

## 2017-08-16 DIAGNOSIS — N18.6 ANEMIA IN ESRD (END-STAGE RENAL DISEASE): Chronic | ICD-10-CM

## 2017-08-16 DIAGNOSIS — E44.0 MODERATE PROTEIN-CALORIE MALNUTRITION: ICD-10-CM

## 2017-08-16 DIAGNOSIS — T86.41 CHRONIC REJECTION OF LIVER TRANSPLANT: ICD-10-CM

## 2017-08-16 DIAGNOSIS — Z29.89 PROPHYLACTIC IMMUNOTHERAPY: ICD-10-CM

## 2017-08-16 DIAGNOSIS — Z94.4 LIVER REPLACED BY TRANSPLANT: Chronic | ICD-10-CM

## 2017-08-16 PROCEDURE — 99213 OFFICE O/P EST LOW 20 MIN: CPT | Mod: PBBFAC

## 2017-08-16 PROCEDURE — 99496 TRANSJ CARE MGMT HIGH F2F 7D: CPT | Mod: PBBFAC

## 2017-08-16 PROCEDURE — 99496 TRANSJ CARE MGMT HIGH F2F 7D: CPT | Mod: S$PBB,,, | Performed by: INTERNAL MEDICINE

## 2017-08-16 PROCEDURE — 99999 PR PBB SHADOW E&M-EST. PATIENT-LVL III: CPT | Mod: PBBFAC,,,

## 2017-08-16 NOTE — Clinical Note
[facility] Visit (Post Discharge Follow-up) Today:  - Our clinic physicians and nurses plan to follow the patient up for any medical issues in the [facility] for 30 days post discharge.  Future Appointments: 8/28/2017  7:50 AM    LAB, TRANSPLANT            [facility]     Herminio Guardado 8/28/2017  9:30 AM    David Choi MD       [facility] ID        Herminio Guardado 8/30/2017  1:00 PM    Viktor Bass MD       [facility]    Trinh 9/13/2017  1:20 PM    Viktor Bass MD       [facility]    Trinh 10/5/2017  9:40 AM    Stan Sosa MD         [facility] IM        Herminio Guardado PCW  10/16/2017 3:00 PM    Lei Pinedo MD     [facility]   Herminio Guardado

## 2017-08-16 NOTE — PROGRESS NOTES
[facility]  New Visit Progress Note   Recent Hospital Discharge     PRESENTING HISTORY     Chief Complaint/Reason for Visit:  Follow up Hospital Discharge   Chief Complaint   Patient presents with    Hospital Follow Up     PCP: No primary care provider on file.    History of Present Illness: Ms. Holly Patel is a 26 y.o. female who was recently admitted to the hospital.    Admission Date: 8/6/2017  Hospital Length of Stay: 3 days  Discharge Date and Time:  08/09/2017 7:06 PM  Attending Physician: Galindo Amor MD  Discharging Provider: TRISHA Wells  Primary Care Provider: [facility] Medicine Team: [facility] MED 4 TRISHA Wells     HPI:   Holly Patel is a 26 y.o. F with HTN, hemangioendothelioma s/p LTx (1992), ESRD on home hemodialysis (still makes urine), and secondary PTH who presented to the ED on 08/04/2017 c/o fever x1 day. She was worked up for infectious cause, but was pan negative on workup and it was decided that etiology was likely viral and she was sent home. Later on after discharge, blood cultures returned 4/4 positive for gram positive rods and the patient was contacted and told to come back to the ED. She states that she felt better at that time and would report to the hospital in the morning. On readmission, she was started on vancomycin and then transferred to the floor. Denies recent travel. She has not had similar symptoms, no prior h/o UTI. She denies any n/v/d, cough, sinus congestion, change in appetite, dysuria, urgency in last few or any sick contacts.    Indwelling Lines/Drains at time of discharge:       Lines/Drains/Airways            Drain                          Hemodialysis AV Fistula Left forearm 30500 days                   Hospital Course:   8/9: started on vancomycin, Cx comes back MRSA, repeated Cx in the next 2 days shoe no growth, patient is asymptomatic the whole duration of her hospital stay. Work  "up include US AV graft comes back normal with no signs of abscess or deep infection. TTE shows ''  There is mild mitral regurgitation. There is a tiny filamentous structure attached to the anterior mitral leaflet. It appears to have independant motion, but is on the ventricular side of the leaflet and therefore is not likely to be a vegetation. 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 D/W ID team again, '' we will treat with vanc for 6 weeks with follow up with ID in 2 weeks to re evaluate the need for complete 6 weeks or just 2 weeks for transit bacteremia. Likely need another TTE.   Also arranged for [facility] clinic next week. arranged with Dr Bass her nephrologist the IV ABx and the need for TID HD until she is done with Abx.  and care point staff complete fine tuning, appreciate efforts.            Objective:      Vital Sign Min/Max (last 24 hours)      Value Min Max   Temp 96.4 °F (35.8 °C) 97.9 °F (36.6 °C)   Pulse 66 93   Resp 16 18   BP: Systolic 76 147   BP: Diastolic 44 94   SpO2 99 % 100 %         Weight: 59 kg (130 lb)  Body mass index is 21.63 kg/m².  No intake or output data in the 24 hours ending 08/08/17 1425   Physical Exam   Constitutional: She is oriented to person, place, and time. She appears well-developed and well-nourished.   HENT:   Head: Normocephalic and atraumatic.   Eyes: " "EOM are normal. Pupils are equal, round, and reactive to light.   Neck: No JVD present.   Cardiovascular: Normal rate, regular rhythm and intact distal pulses.    Murmur heard.   Systolic murmur is present with a grade of 2/6   Pulmonary/Chest: Effort normal and breath sounds normal. No respiratory distress.   Abdominal: Soft. Bowel sounds are normal. She exhibits no distension. There is no tenderness.   Musculoskeletal: She exhibits no edema.   AV fistula on left arm - has good thrill. No erythema, inflammation, TTP around site   Neurological: She is alert and oriented to person, place, and time.   Skin: Skin is warm and dry. Capillary refill takes less than 2 seconds.   Psychiatric: She has a normal mood and affect. Her behavior is normal. Judgment and thought content normal.   Vitals reviewed.     Significant Diagnostic Studies:     Microbiology:   Blood Culture         Lab Results   Component Value Date     LABBLOO No Growth to date 08/07/2017     LABBLOO No Growth to date 08/07/2017     LABBLOO No Growth to date 08/07/2017            Blood culture #2 [[id number]]  Collected: 08/05/17 0211   Order Status: Completed Specimen: Blood from Peripheral, Hand, Right Updated: 08/07/17 0959     Blood Culture, Routine Gram stain aer bottle: Gram positive cocci in clusters resembling Staph      Blood Culture, Routine Gram stain sachin bottle: Gram positive cocci in clusters resembling Staph      Blood Culture, Routine Results called to and read back by:DR Scruggs   08/05/2017  22:30     Blood Culture, Routine --     METHICILLIN RESISTANT STAPHYLOCOCCUS AUREUS   ID consult required at [facility].   ID consult required at [facility], [facility] and [facility].    Narrative:     Blood Culture #2   Susceptibility               Methicillin resistant staphylococcus aureus       CULTURE, BLOOD       Clindamycin <=0.5 "><=0.5  Sensitive       Erythromycin >4  Resistant       Oxacillin >2  Resistant       Penicillin " "8  Resistant       Tetracycline <=4 "><=4  Sensitive       Trimeth/Sulfa <=0.5/9.5 "><=0.5/9.5  Sensitive       Vancomycin 1  Sensitive                        Blood Culture #1 **CANNOT BE ORDERED STAT** [[id number]]  Collected: 08/05/17 0201   Order Status: Completed Specimen: Blood from Peripheral, Antecubital, Right Updated: 08/07/17 0958     Blood Culture, Routine Gram stain aer bottle: Gram positive cocci in clusters resembling Staph      Blood Culture, Routine Gram stain sachin bottle: Gram positive cocci in clusters resembling Staph      Blood Culture, Routine Results called to and read back by:DR Scruggs   08/05/2017  22:30     Blood Culture, Routine --     METHICILLIN RESISTANT STAPHYLOCOCCUS AUREUS   ID consult required at [facility] and [facility].   ID consult required at [facility] and [facility].    Susceptibility               Methicillin resistant staphylococcus aureus       CULTURE, BLOOD       Clindamycin <=0.5 "><=0.5  Sensitive       Erythromycin >4  Resistant       Oxacillin >2  Resistant       Penicillin >8  Resistant       Tetracycline <=4 "><=4  Sensitive       Trimeth/Sulfa <=0.5/9.5 "><=0.5/9.5  Sensitive       Vancomycin 2  Sensitive                          Radiology:  Dialysis Access: Left Arm Radiocephalic fistula  Impression:   Color flow duplex exam reveals a patent left radiocephalic AV fistula. There is no evidence of a focal hemodynamically significant stenosis, despite elevated velocities noted at the anastomosis. Volume flow at mid bicep level measures 1254 ml./min.            Final Active Diagnoses:     Diagnosis Date Noted POA    PRINCIPAL PROBLEM:  MRSA bacteremia [R78.81] 08/06/2017 Yes    Secondary hyperparathyroidism [N25.81] 08/05/2017 Yes    Immunosuppressed [D89.9] 08/05/2017 Yes    Thrombocytopenia [D69.6] 04/12/2016 Yes    Chronic rejection of liver transplant [T86.41] 03/22/2016 Yes    Anemia in ESRD (end-stage renal disease) [N18.6, D63.1] " 10/12/2015 Yes       Chronic    Renovascular hypertension [I15.0] 10/02/2015 Yes    ESRD on hemodialysis [N18.6, Z99.2] 09/30/2015 Not Applicable       Chronic    Hypertensive urgency, malignant [I16.0] 10/06/2014 Yes    Prophylactic immunotherapy [Z29.8] 08/04/2014 Not Applicable   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 [Z94.4] 09/10/2012 Not Applicable       Chronic       Problems Resolved During this Admission:     Diagnosis Date Noted Date Resolved POA          Secondary hyperparathyroidism     2/2 ESRD       Immunosuppressed     S/p liver transplant in 1992  -Prograf at home          vThrombocytopenia     77 currently which is baseline         Chronic rejection of liver transplant     Prograf        Anemia in ESRD (end-stage renal disease)     Stable and at baseline in this ESRD patient on HD  -Hgb 9.7 today which is baseline, will monitor for changes       Renovascular hypertension            ESRD on hemodialysis     Home HD schedule is Sun, Mon, Tue, Thu & Sat  -Will need to be adjusted to 3x weekly while patient is receiving parenteral antibiotics per ID and will need to remain on HD 3x weekly in order to receive vanc dosing with her schedule at home  -Last HD today        Hypertensive urgency, malignant     Normotensive after reinitiating of home meds  Asymptomatic and no overt signs of end-organ damage at this time  - continue home BP meds amlodipine, clonidine and labetalol.        Prophylactic immunotherapy            Liver replaced by transplant     Transplanted in 1992 for hemangioendothelioma; patient is on prograf & prednisone at home  -Prednisone every other day while in hospital; next dose 08/07/2017  -Prograf held in light of infection       * MRSA bacteremia     Bacteremia 4/4 blood cultures from 08/05/2017, sensitivities pending  [facility] no growth 2 days; 08/07 Bcx no growth 1 day  -L arm fistula patent without evidence of thrombus or stenosis  -TTE not ordered as ID  believes a vegetartion to be unlikley  -VAS u/S of L arm graft was patent without stenosis  -Renally dosing vanc, CrCl 4.5; given 750mg ; random vanc level 12.6 yesterday; received 1g yesterday s/p HD with a random level this AM of 26; consulted pharmD to assist with dosing  -ID to assist with dosing outpatient, will f/u their recs        ___________________________________________________________________    Today:  Doing well. No fever. No chest pain.  [facility] M[facility] in the morning.  She gets Vancomycin with HD.  She does complain about losing weight despite eating last few months.  No diarrhea.    PAST HISTORY:     Past Medical History:   Diagnosis Date    Anemia in ESRD (end-stage renal disease) 10/12/2015    Chronic rejection of liver transplant 3/22/2016    ESRD on hemodialysis 2015    History of splenomegaly 2016    Immunosuppressed 2017    Iron deficiency anemia secondary to inadequate dietary iron intake 2017    She [facility] iron [facility].   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 ()    Moderate protein-calorie malnutrition 2017    MRSA bacteremia 2017    Prophylactic immunotherapy 2014    Renovascular hypertension 10/2/2015    Secondary hyperparathyroidism 2017    Thrombocytopenia 2016       Past Surgical History:   Procedure Laterality Date     SECTION      2     LIVER BIOPSY      LIVER TRANSPLANT  1992    TUBAL LIGATION         Family History   Problem Relation Age of Onset    Hypertension Mother     Hypertension Father     Melanoma Neg Hx        Social History     Social History    Marital status:      Spouse name: N/A    Number of children: N/A    Years of education: N/A     Social History Main Topics    Smoking status: Never Smoker    Smokeless tobacco: Never Used    Alcohol use No    Drug use: No    Sexual activity:  Yes     Partners: Male     Other Topics Concern    Are You Pregnant Or Think You May Be? No    Breast-Feeding No     Social History Narrative    ** Merged History Encounter **            MEDICATIONS & ALLERGIES:     Current Outpatient Prescriptions on File Prior to Visit   Medication Sig Dispense Refill    amlodipine (NORVASC) 10 MG tablet Take 1 tablet (10 mg total) by mouth once daily. 7 tablet 1    cinacalcet (SENSIPAR) 60 MG Tab Take 1 tablet (60 mg total) by mouth daily with breakfast. 30 tablet 11    labetalol (NORMODYNE) 200 MG tablet Take 1 tablet (200 mg total) by mouth 2 (two) times daily. 60 tablet 11    predniSONE (DELTASONE) 1 MG tablet Take 1 mg by mouth every other day.      tacrolimus (PROGRAF) 1 MG Cap Take 8 capsules (8 mg total) by mouth every 12 (twelve) hours. 480 capsule 11    triamcinolone acetonide 0.1% (KENALOG) 0.1 % ointment AAA on arms, legs, and neck bid x 1-2 wks then prn flares only (Patient taking differently: Apply to affected area(s) on arms, legs, and neck twice daily x 1-2 wks then as needed for flares only) 80 g 3    VANCOMYCIN HCL (VANCOMYCIN 1 G/250 ML D5W, READY TO MIX SYSTEM,) Inject 125 mLs (500 mg total) into the vein Every 3 (three) days. 1250 mL 1     Review of patient's allergies indicates:   Allergen Reactions    Chloral hydrate      Other reaction(s): Hallucinations  Other reaction(s): Hives    Hydrocodone        OBJECTIVE:     Vital Signs:  Vitals:    08/16/17 1519   BP: 138/75   Pulse:      Wt Readings from Last 1 Encounters:   08/16/17 1500 56.9 kg (125 lb 7.1 oz)     Body mass index is 20.87 kg/m².     Physical Exam:  General: Well developed, well nourished. No distress.  HEENT: Head is normocephalic, atraumatic   Eyes: Clear conjunctiva.  Neck: Supple, symmetrical neck; trachea midline.  Lungs: Clear to auscultation bilaterally and normal respiratory effort.  Cardiovascular: Heart with regular rate and rhythm.    Extremities: No LE edema. Pulses 2+ and  symmetric.   Left arm AV fistula with good thrill. No redness or evidence cellulitis.  Abdomen: Abdomen is soft, non-tender non-distended with normal bowel sounds.  Skin: Skin color, texture, turgor normal.    Musculoskeletal: Normal gait.   Lymph Nodes: No cervical or supraclavicular adenopathy.  Neurologic: Normal strength and tone.   Psychiatric: Not depressed.      Laboratory  Lab Results   Component Value Date    WBC 3.86 (L) 08/09/2017    HGB 9.9 (L) 08/09/2017    HCT 29.6 (L) 08/09/2017    MCV 81 (L) 08/09/2017    PLT 92 (L) 08/09/2017     BMP  Lab Results   Component Value Date     08/09/2017    K 4.0 08/09/2017     08/09/2017    CO2 27 08/09/2017    BUN 34 (H) 08/09/2017    CREATININE 8.5 (H) 08/09/2017    CALCIUM 8.2 (L) 08/09/2017    ANIONGAP 10 08/09/2017    ESTGFRAFRICA 6.8 (A) 08/09/2017    EGFRNONAA 5.9 (A) 08/09/2017     Lab Results   Component Value Date    ALT 35 08/09/2017    AST 37 08/09/2017     (H) 06/27/2012    ALKPHOS 588 (H) 08/09/2017    BILITOT 0.6 08/09/2017     Lab Results   Component Value Date    INR 1.0 08/05/2017    INR 1.0 07/14/2017    INR 1.0 06/12/2017     Lab Results   Component Value Date    HGBA1C 4.7 10/01/2015     TRANSITION OF CARE:     [facility] On Call Contact Note: 8-11-17    Family and/or Caretaker present at visit?  Yes.  Diagnostic tests reviewed/disposition: No diagnosic tests pending after this hospitalization.  Disease/illness education: MRSA bacteremia, immunosuppression  Home health/community services discussion/referrals: Patient does not have home health established from hospital visit.  They do not need home health.  If needed, we will set up home health for the patient.   Establishment or re-establishment of referral orders for community resources: No other necessary community resources.   Discussion with other health care providers: No discussion with other health care providers necessary.     Medications Reconciliation:   I have reconciled  the patient's home medications and discharge medications with the patient/family. I have updated all changes.  Refer to After-Visit Medication List.    ASSESSMENT & PLAN:     MRSA bacteremia  - On IV Vancomycin for 6 weeks. She refused JEFFREY in the hospital.    AV graft without evidence of infection per recent US.  - Instructed patient not to scratch arm with fingernails.  - Try to eradicate MRSA state with daily 15-minute dilute bleach water soaks (one-fourth cup of bleach per full bathtub of water) for five days.  - Follow up with ID as scheduled.    ESRD on hemodialysis  -     Continue HD MWF.    Prophylactic immunotherapy  Chronic rejection of liver transplant  Immunosuppressed  Liver replaced by transplant  -  Continue tacrolimus 8 mg BID.    Secondary hyperparathyroidism  - On Sensipar daily.    Renovascular hypertension  - Controlled on amlodipine 10 mg daily.    Thrombocytopenia  - Chronic due to liver disease.    Iron deficiency anemia secondary to inadequate dietary iron intake  Anemia in ESRD (end-stage renal disease)  -   Her MCV has improved. She receives IV iron periodically with hemodialysis.    Moderate protein-calorie malnutrition  - Weight loss in dialysis patient exacerbated by recent infection.    Rx:  -     food supplemt, lactose-reduced (ENSURE ACTIVE HIGH PROTEIN) Liqd; Take 236 mLs by mouth 2 (two) times daily.  Dispense: 60 Can; Refill: 6    Scheduled Follow-up :    Patient to establish new PCP with Dr. Sosa 10/5/17  Future Appointments  Date Time Provider Department Center   8/28/2017 7:50 AM LAB, TRANS[facility]   8/28/2017 9:30 AM David Choi MD [facility] ID [facility]   8/30/2017 1:00 PM Viktor Bass MD [facility] NE[facility]RO [place]   9/13/2017 1:20 PM Viktor Bass MD [facility] NE[facility]RO [place]   10/5/2017 9:40 AM Stan Sosa MD [facility] IM [facility] PCW   10/16/2017 3:00 PM Lei Pinedo MD [facility] LIVERTX [facility]       After Visit Medication List :     Medication List           Accurate as of 8/16/17  3:33 PM. If you have any questions, ask your nurse or doctor.               START taking these medications    food supplemt, lactose-reduced Liqd  Commonly known as:  ENSURE ACTIVE HIGH PROTEIN  Take 236 mLs by mouth 2 (two) times daily.        CHANGE how you take these medications    triamcinolone acetonide 0.1% 0.1 % ointment  Commonly known as:  KENALOG  AAA on arms, legs, and neck bid x 1-2 wks then prn flares only  What changed:  additional instructions        CONTINUE taking these medications    amlodipine 10 MG tablet  Commonly known as:  NORVASC  Take 1 tablet (10 mg total) by mouth once daily.     cinacalcet 60 MG Tab  Commonly known as:  SENSIPAR  Take 1 tablet (60 mg total) by mouth daily with breakfast.     labetalol 200 MG tablet  Commonly known as:  NORMODYNE  Take 1 tablet (200 mg total) by mouth 2 (two) times daily.     predniSONE 1 MG tablet  Commonly known as:  DELTASONE     tacrolimus 1 MG Cap  Commonly known as:  PROGRAF  Take 8 capsules (8 mg total) by mouth every 12 (twelve) hours.     VANCOMYCIN 1 G/250 ML D5W (READY TO MIX SYSTEM)  Inject 125 mLs (500 mg total) into the vein Every 3 (three) days.           Where to Get Your Medications      These medications were sent to [id number] - [place] - [address]  [address], [place] [address]    Phone:  [phone]   · food supplemt, lactose-reduced Liqd           Signing Physician:  Galindo Amor MD

## 2017-08-28 ENCOUNTER — LAB VISIT (OUTPATIENT)
Dept: LAB | Facility: HOSPITAL | Age: 27
End: 2017-08-28
Attending: INTERNAL MEDICINE
Payer: MEDICARE

## 2017-08-28 ENCOUNTER — OFFICE VISIT (OUTPATIENT)
Dept: INFECTIOUS DISEASES | Facility: CLINIC | Age: 27
End: 2017-08-28
Payer: MEDICARE

## 2017-08-28 VITALS
DIASTOLIC BLOOD PRESSURE: 122 MMHG | HEIGHT: 65 IN | HEART RATE: 81 BPM | TEMPERATURE: 98 F | BODY MASS INDEX: 22.44 KG/M2 | SYSTOLIC BLOOD PRESSURE: 208 MMHG | WEIGHT: 134.69 LBS

## 2017-08-28 DIAGNOSIS — Z94.4 LIVER TRANSPLANTED: ICD-10-CM

## 2017-08-28 DIAGNOSIS — B95.62 MRSA BACTEREMIA: Primary | ICD-10-CM

## 2017-08-28 DIAGNOSIS — R78.81 MRSA BACTEREMIA: Primary | ICD-10-CM

## 2017-08-28 LAB
ALBUMIN SERPL BCP-MCNC: 2.8 G/DL
ALP SERPL-CCNC: 740 U/L
ALT SERPL W/O P-5'-P-CCNC: 58 U/L
ANION GAP SERPL CALC-SCNC: 10 MMOL/L
ANISOCYTOSIS BLD QL SMEAR: SLIGHT
AST SERPL-CCNC: 70 U/L
BASOPHILS NFR BLD: 0 %
BILIRUB SERPL-MCNC: 0.5 MG/DL
BUN SERPL-MCNC: 59 MG/DL
CALCIUM SERPL-MCNC: 8.9 MG/DL
CHLORIDE SERPL-SCNC: 108 MMOL/L
CO2 SERPL-SCNC: 21 MMOL/L
CREAT SERPL-MCNC: 10.8 MG/DL
DIFFERENTIAL METHOD: ABNORMAL
EOSINOPHIL NFR BLD: 10 %
ERYTHROCYTE [DISTWIDTH] IN BLOOD BY AUTOMATED COUNT: 14.5 %
EST. GFR  (AFRICAN AMERICAN): 5.1 ML/MIN/1.73 M^2
EST. GFR  (NON AFRICAN AMERICAN): 4.4 ML/MIN/1.73 M^2
GLUCOSE SERPL-MCNC: 81 MG/DL
HCT VFR BLD AUTO: 27.8 %
HGB BLD-MCNC: 9.3 G/DL
HYPOCHROMIA BLD QL SMEAR: ABNORMAL
LYMPHOCYTES NFR BLD: 43 %
MCH RBC QN AUTO: 27.3 PG
MCHC RBC AUTO-ENTMCNC: 33.5 G/DL
MCV RBC AUTO: 82 FL
MONOCYTES NFR BLD: 7 %
NEUTROPHILS NFR BLD: 40 %
OVALOCYTES BLD QL SMEAR: ABNORMAL
PLATELET # BLD AUTO: 85 K/UL
PLATELET BLD QL SMEAR: ABNORMAL
PMV BLD AUTO: 9.7 FL
POIKILOCYTOSIS BLD QL SMEAR: SLIGHT
POLYCHROMASIA BLD QL SMEAR: ABNORMAL
POTASSIUM SERPL-SCNC: 4.4 MMOL/L
PROT SERPL-MCNC: 7.2 G/DL
RBC # BLD AUTO: 3.41 M/UL
SODIUM SERPL-SCNC: 139 MMOL/L
TACROLIMUS BLD-MCNC: 18.4 NG/ML
WBC # BLD AUTO: 1.42 K/UL

## 2017-08-28 PROCEDURE — 99213 OFFICE O/P EST LOW 20 MIN: CPT | Mod: PBBFAC | Performed by: INTERNAL MEDICINE

## 2017-08-28 PROCEDURE — 99215 OFFICE O/P EST HI 40 MIN: CPT | Mod: S$PBB,,, | Performed by: INTERNAL MEDICINE

## 2017-08-28 PROCEDURE — 99999 PR PBB SHADOW E&M-EST. PATIENT-LVL III: CPT | Mod: PBBFAC,,, | Performed by: INTERNAL MEDICINE

## 2017-08-28 NOTE — LETTER
August 28, 2017      Galindo Amor MD  [address]  [facility] [address]           [facility]  [address]  [facility] [address]  Phone: [phone]  Fax: [phone]          Patient: Holly Patel   MR Number: [id number]   YOB: 1990   Date of Visit: 8/28/2017       Dear Dr. Galindo Amor:    Thank you for referring Holly Patel to me for evaluation. Attached you will find relevant portions of my assessment and plan of care.    If you have questions, please do not hesitate to call me. I look forward to following Holly Patel along with you.    Sincerely,    David Choi MD    Enclosure  CC:  No Recipients    If you would like to receive this communication electronically, please contact [email or web address] or [phone] to request more information on [email or web address] Link access.    For providers and/or their staff who would like to refer a patient to [facility], please contact us through our one-stop-shop provider referral line, [facility], at [phone].    If you feel you have received this communication in error or would no longer like to receive these types of communications, please e-mail [email or web address]

## 2017-08-28 NOTE — PROGRESS NOTES
Infectious Diseases Clinic Note    Subjective:       Patient ID: Holly Patel is a 26 y.o. female.    Chief Complaint: No chief complaint on file.    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 per 6/2017 biopsy; on maintenance tacro/prednisone), HTN (poorly controlled), and ESRD (due to HTN/CNI toxicity; on home HD via AVF since 2/2016; kidney transplant evaluation delayed until compliance demonstrated) who was admitted on 8/5/2017 with acute onset fever and chills at home following dialysis without other localizing symptoms found to be due to indolent MRSA septicemia of unknown etiology with TTE suggestive of MV endocarditis (filament on MV; however on atypical side of heart for infection, and did not grow MRSA from blood cultures pror to receipt of IV abx - inconsistent with diagnosis.  JEFFREY recommended to further define though pt refused with d/c plan for vancomycin x 6 weeks with HD  Doing well with no issues    Past Medical History:   Diagnosis Date    Anemia in ESRD (end-stage renal disease) 10/12/2015    Chronic rejection of liver transplant 3/22/2016    ESRD on hemodialysis 9/30/2015    History of splenomegaly 4/12/2016    Immunosuppressed 8/5/2017    Iron deficiency anemia secondary to inadequate dietary iron intake 8/16/2017    [facility]   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)    Moderate protein-calorie malnutrition 8/16/2017    MRSA bacteremia 8/6/2017    Prophylactic immunotherapy 8/4/2014    Renovascular hypertension 10/2/2015    Secondary hyperparathyroidism 8/5/2017    Thrombocytopenia 4/12/2016       Social History     Social History    Marital status:      Spouse name: N/A    Number of children: N/A    Years of education: N/A      Occupational History    Not on file.     Social History Main Topics    Smoking status: Never Smoker    Smokeless tobacco: Never Used    Alcohol use No    Drug use: No    Sexual activity: Yes     Partners: Male     Other Topics Concern    Are You Pregnant Or Think You May Be? No    Breast-Feeding No     Social History Narrative    ** Merged History Encounter **              Current Outpatient Prescriptions:     amlodipine (NORVASC) 10 MG tablet, Take 1 tablet (10 mg total) by mouth once daily., Disp: 7 tablet, Rfl: 1    cinacalcet (SENSIPAR) 60 MG Tab, Take 1 tablet (60 mg total) by mouth daily with breakfast., Disp: 30 tablet, Rfl: 11    food supplemt, lactose-reduced (ENSURE ACTIVE HIGH PROTEIN) Liqd, Take 236 mLs by mouth 2 (two) times daily., Disp: 60 Can, Rfl: 6    labetalol (NORMODYNE) 200 MG tablet, Take 1 tablet (200 mg total) by mouth 2 (two) times daily., Disp: 60 tablet, Rfl: 11    predniSONE (DELTASONE) 1 MG tablet, Take 1 mg by mouth every other day., Disp: , Rfl:     tacrolimus (PROGRAF) 1 MG Cap, Take 8 capsules (8 mg total) by mouth every 12 (twelve) hours., Disp: 480 capsule, Rfl: 11    triamcinolone acetonide 0.1% (KENALOG) 0.1 % ointment, AAA on arms, legs, and neck bid x 1-2 wks then prn flares only (Patient taking differently: Apply to affected area(s) on arms, legs, and neck twice daily x 1-2 wks then as needed for flares only), Disp: 80 g, Rfl: 3    VANCOMYCIN HCL (VANCOMYCIN 1 G/250 ML D5W, READY TO MIX SYSTEM,), Inject 125 mLs (500 mg total) into the vein Every 3 (three) days., Disp: 1250 mL, Rfl: 1    Review of Systems   Constitutional: Negative for activity change, chills and fever.   HENT: Negative for congestion, mouth sores, rhinorrhea, sinus pressure and sore throat.    Eyes: Negative for photophobia, pain and redness.   Respiratory: Negative for cough, chest tightness, shortness of breath and wheezing.    Cardiovascular: Negative for chest pain and leg swelling.    Gastrointestinal: Negative for abdominal distention, abdominal pain, diarrhea, nausea and vomiting.   Endocrine: Negative for polyuria.   Genitourinary: Negative for decreased urine volume, dysuria and flank pain.   Musculoskeletal: Negative for joint swelling and neck pain.   Skin: Negative for color change.   Allergic/Immunologic: Negative for food allergies.   Neurological: Negative for dizziness, weakness and headaches.   Hematological: Negative for adenopathy.   Psychiatric/Behavioral: Negative for agitation and confusion. The patient is not nervous/anxious.            Objective:       Vitals:    08/28/17 0919   BP: (!) 208/122   Pulse: 81   Temp: 98 °F (36.7 °C)       There were no vitals filed for this visit.  Physical Exam   Constitutional: She is oriented to person, place, and time. She appears well-developed and well-nourished.   HENT:   Head: Normocephalic and atraumatic.   Eyes: Pupils are equal, round, and reactive to light.   Neck: Normal range of motion. Neck supple.   Cardiovascular: Normal rate.    Pulmonary/Chest: Effort normal and breath sounds normal.   Abdominal: Soft. Bowel sounds are normal.   Musculoskeletal: She exhibits no edema or tenderness.   Neurological: She is alert and oriented to person, place, and time.   Skin: Skin is warm and dry.   Psychiatric: She has a normal mood and affect.           Assessment/Plan:       No diagnosis found.    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 per 6/2017 biopsy; on maintenance tacro/prednisone), HTN (poorly controlled), and ESRD (due to HTN/CNI toxicity; on home HD via AVF since 2/2016; kidney transplant evaluation delayed until compliance demonstrated) who was admitted on 8/5/2017 with acute onset fever and chills at home following dialysis without other localizing symptoms found to be due to indolent MRSA  septicemia of unknown etiology with TTE suggestive of MV endocarditis (filament on MV; however on atypical side of heart for infection, and did not grow MRSA from blood cultures pror to receipt of IV abx - inconsistent with diagnosis.  JEFFREY recommended to further define though pt refused with d/c plan for vancomycin x 6 weeks with HD  - Repeat TTE  - complete 6 week course as previously defined  - get vanco trough from HD

## 2017-08-29 DIAGNOSIS — Z94.4 LIVER TRANSPLANTED: ICD-10-CM

## 2017-08-29 NOTE — TELEPHONE ENCOUNTER
----- Message from Lei Pinedo MD sent at 8/29/2017  4:02 PM CDT -----  Results reviewed  Why is her tac so high?

## 2017-08-29 NOTE — TELEPHONE ENCOUNTER
----- Message from Lei Pinedo MD sent at 8/29/2017  4:02 PM CDT -----  Results reviewed  Let's repeat in 48 hrs

## 2017-08-29 NOTE — TELEPHONE ENCOUNTER
Spoke with pt, states she did not take prograf prior to lab. Dr. Pinedo wants pt to decrease tac to 5mg BID and repeat labs Friday.

## 2017-08-30 ENCOUNTER — OFFICE VISIT (OUTPATIENT)
Dept: NEPHROLOGY | Facility: CLINIC | Age: 27
End: 2017-08-30
Payer: MEDICARE

## 2017-08-30 VITALS
DIASTOLIC BLOOD PRESSURE: 100 MMHG | SYSTOLIC BLOOD PRESSURE: 160 MMHG | WEIGHT: 130.06 LBS | HEIGHT: 65 IN | BODY MASS INDEX: 21.67 KG/M2

## 2017-08-30 DIAGNOSIS — N18.5 ANEMIA OF CHRONIC RENAL FAILURE, STAGE 5: ICD-10-CM

## 2017-08-30 DIAGNOSIS — D63.1 ANEMIA OF CHRONIC RENAL FAILURE, STAGE 5: ICD-10-CM

## 2017-08-30 DIAGNOSIS — I12.0 HYPERTENSIVE KIDNEY DISEASE WITH END-STAGE RENAL DISEASE: ICD-10-CM

## 2017-08-30 DIAGNOSIS — N25.81 HYPERPARATHYROIDISM, SECONDARY RENAL: ICD-10-CM

## 2017-08-30 DIAGNOSIS — N18.6 ESRD (END STAGE RENAL DISEASE): Primary | ICD-10-CM

## 2017-08-30 DIAGNOSIS — N18.6 HYPERTENSIVE KIDNEY DISEASE WITH END-STAGE RENAL DISEASE: ICD-10-CM

## 2017-08-30 DIAGNOSIS — Z94.4 LIVER REPLACED BY TRANSPLANT: ICD-10-CM

## 2017-08-30 PROCEDURE — 99213 OFFICE O/P EST LOW 20 MIN: CPT | Mod: PBBFAC,PO | Performed by: INTERNAL MEDICINE

## 2017-08-30 PROCEDURE — 99999 PR PBB SHADOW E&M-EST. PATIENT-LVL III: CPT | Mod: PBBFAC,,, | Performed by: INTERNAL MEDICINE

## 2017-08-30 PROCEDURE — 99499 UNLISTED E&M SERVICE: CPT | Mod: S$PBB,,, | Performed by: INTERNAL MEDICINE

## 2017-08-30 RX ORDER — TACROLIMUS 1 MG/1
5 CAPSULE ORAL EVERY 12 HOURS
Qty: 300 CAPSULE | Refills: 11 | Status: SHIPPED | OUTPATIENT
Start: 2017-08-30 | End: 2018-01-22 | Stop reason: SDUPTHER

## 2017-09-05 ENCOUNTER — HOSPITAL ENCOUNTER (OUTPATIENT)
Dept: CARDIOLOGY | Facility: HOSPITAL | Age: 27
Discharge: HOME | End: 2017-09-05
Attending: INTERNAL MEDICINE
Payer: MEDICARE

## 2017-09-05 DIAGNOSIS — B95.62 MRSA BACTEREMIA: ICD-10-CM

## 2017-09-05 DIAGNOSIS — R78.81 MRSA BACTEREMIA: ICD-10-CM

## 2017-09-05 LAB
AORTIC VALVE REGURGITATION: ABNORMAL
DIASTOLIC DYSFUNCTION: YES
ESTIMATED PA SYSTOLIC PRESSURE: 34.14
MITRAL VALVE REGURGITATION: ABNORMAL
RETIRED EF AND QEF - SEE NOTES: 55 (ref 55–65)
TRICUSPID VALVE REGURGITATION: ABNORMAL

## 2017-09-05 PROCEDURE — 93306 TTE W/DOPPLER COMPLETE: CPT

## 2017-09-05 PROCEDURE — 93306 TTE W/DOPPLER COMPLETE: CPT | Mod: 26,,, | Performed by: INTERNAL MEDICINE

## 2017-09-05 NOTE — PROGRESS NOTES
Subjective:       Patient ID: Holly Patel is a 26 y.o. Black or  female who presents for follow-up evaluation of ESRD    HPI    Ms. 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  Patient initiated on hemodialysis October 2015, trained on home hemodialysis February 2016, now performing at home without difficulty (with main assistance from her sister).  Patient reports blood pressures have been elevated.  She otherwise denies any fever, chest pain, shortness of breath, abdominal pain, diarrhea, dysuria/hematuria.    Review of Systems   Constitutional: Negative for appetite change, fatigue and fever.   Respiratory: Negative for chest tightness and shortness of breath.    Cardiovascular: Negative for chest pain and leg swelling.   Gastrointestinal: Negative for abdominal pain, constipation, diarrhea, nausea and vomiting.   Genitourinary: Negative for difficulty urinating, dysuria, flank pain, frequency, hematuria and urgency.   Musculoskeletal: Negative for arthralgias, joint swelling and myalgias.   Skin: Negative for rash and wound.   Neurological: Negative for dizziness, weakness and light-headedness.   All other systems reviewed and are negative.      Objective:      Physical Exam   Constitutional: She appears well-developed and well-nourished.   Cardiovascular: Normal rate, regular rhythm and normal heart sounds.  Exam reveals no gallop and no friction rub.    No murmur heard.  Pulmonary/Chest: Effort normal and breath sounds normal. No respiratory distress. She has no wheezes. She has no rales.   Abdominal: Soft. Bowel sounds are normal. There is no tenderness.   Musculoskeletal: She exhibits no edema.   Neurological: She is alert.   Skin: Skin is warm and dry. No rash noted. No erythema.   Psychiatric: She has a normal mood and affect.   Vitals  reviewed.    Access: L AVF w/ good thrill/bruit    Non-OCF labs Aug  eKt/V 1.56  H/H 10/30.8  Ca/Phos 9.8/5.5  PTH 2650  Alb 3.3    Assessment:       1. ESRD (end stage renal disease)    2. Hypertensive kidney disease with end-stage renal disease    3. Liver replaced by transplant    4. Hyperparathyroidism, secondary renal    5. Anemia of chronic renal failure, stage 5        Plan:     Ms. 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  Patient initiated on hemodialysis October 2015, trained on home hemodialysis February 2016, now performing at home without difficulty (with main assistance from her sister).  Kt/V previously above goal, reduced treatments to 4x/wk, now near goal, will continue to monitor clearance.  Patient followed by Transplant for possible kidney transplantation.  - Hypertension: BP elevated, increased labetolol to 400mg twice daliy (previously unable to afford carvedilol)  - Anemia: Hgb at goal, continue erythropoetin therapy  - Bone/mineral metabolism: patient with secondary hyperparathyroidism, discussed low phosphorous diet; previously increased calcitriol, will increase cinacalcet (had not received previously due to insurance/pharmacy)    Return to clinic monthly

## 2017-09-06 ENCOUNTER — TELEPHONE (OUTPATIENT)
Dept: TRANSPLANT | Facility: CLINIC | Age: 27
End: 2017-09-06

## 2017-09-06 NOTE — LETTER
September 6, 2017    Michaelroya Jorge  [address] [place] [id number]          Dear Holly Patel:  MRN: [id number]    Your lab work was due to be drawn on 9/1/17.  We contacted your lab and were unable to get test results.  It is very important to get your labs drawn as scheduled.  We cannot monitor you for rejection, infections, or drug toxicity side effects without lab results. Compliance is a very important part of post transplant care. Please call us at [phone] as soon as possible to let us know when you plan to have labs drawn.    Sincerely,      Violeta Francis, RN, BSN  Liver Transplant Coordinator  [facility]  [address] [place] [id number]  [phone]

## 2017-09-13 ENCOUNTER — OFFICE VISIT (OUTPATIENT)
Dept: NEPHROLOGY | Facility: CLINIC | Age: 27
End: 2017-09-13
Payer: MEDICARE

## 2017-09-13 VITALS
WEIGHT: 127.44 LBS | HEART RATE: 112 BPM | BODY MASS INDEX: 21.23 KG/M2 | HEIGHT: 65 IN | DIASTOLIC BLOOD PRESSURE: 90 MMHG | OXYGEN SATURATION: 99 % | SYSTOLIC BLOOD PRESSURE: 140 MMHG

## 2017-09-13 DIAGNOSIS — N18.5 ANEMIA OF CHRONIC RENAL FAILURE, STAGE 5: ICD-10-CM

## 2017-09-13 DIAGNOSIS — D63.1 ANEMIA OF CHRONIC RENAL FAILURE, STAGE 5: ICD-10-CM

## 2017-09-13 DIAGNOSIS — N18.6 ESRD (END STAGE RENAL DISEASE): Primary | ICD-10-CM

## 2017-09-13 DIAGNOSIS — N25.81 HYPERPARATHYROIDISM, SECONDARY RENAL: ICD-10-CM

## 2017-09-13 DIAGNOSIS — N18.6 HYPERTENSIVE KIDNEY DISEASE WITH END-STAGE RENAL DISEASE: ICD-10-CM

## 2017-09-13 DIAGNOSIS — Z94.4 LIVER REPLACED BY TRANSPLANT: ICD-10-CM

## 2017-09-13 DIAGNOSIS — I12.0 HYPERTENSIVE KIDNEY DISEASE WITH END-STAGE RENAL DISEASE: ICD-10-CM

## 2017-09-13 PROCEDURE — 99499 UNLISTED E&M SERVICE: CPT | Mod: S$PBB,,, | Performed by: INTERNAL MEDICINE

## 2017-09-13 PROCEDURE — 99999 PR PBB SHADOW E&M-EST. PATIENT-LVL III: CPT | Mod: PBBFAC,,, | Performed by: INTERNAL MEDICINE

## 2017-09-13 PROCEDURE — 99213 OFFICE O/P EST LOW 20 MIN: CPT | Mod: PBBFAC,PO | Performed by: INTERNAL MEDICINE

## 2017-09-19 NOTE — PROGRESS NOTES
Subjective:       Patient ID: Holly Patel is a 27 y.o. Black or  female who presents for follow-up evaluation of ESRD    HPI    Ms. Patel is a 27 year old woman with past medical history of liver transplant (1992 for hemangioendothelioma), malignant hypertension presenting for follow up of ESRD on home hemo (NxStage).  Patient initiated on hemodialysis October 2015, trained on home hemodialysis February 2016, performing at home without difficulty (with main assistance from her sister).  Patient recently admitted for MRSA septicemia (of unknown etiology with TTE suggestive of MV endocarditis), currently on 6 week course of vancomycin (with in-center HD).  Patient reports blood pressures have been elevated.  She otherwise denies any fever, chest pain, shortness of breath, abdominal pain, diarrhea, dysuria/hematuria.    Review of Systems   Constitutional: Negative for appetite change, fatigue and fever.   Respiratory: Negative for chest tightness and shortness of breath.    Cardiovascular: Negative for chest pain and leg swelling.   Gastrointestinal: Negative for abdominal pain, constipation, diarrhea, nausea and vomiting.   Genitourinary: Negative for difficulty urinating, dysuria, flank pain, frequency, hematuria and urgency.   Musculoskeletal: Negative for arthralgias, joint swelling and myalgias.   Skin: Negative for rash and wound.   Neurological: Negative for dizziness, weakness and light-headedness.   All other systems reviewed and are negative.      Objective:      Physical Exam   Constitutional: She appears well-developed and well-nourished.   Cardiovascular: Normal rate, regular rhythm and normal heart sounds.  Exam reveals no gallop and no friction rub.    No murmur heard.  Pulmonary/Chest: Effort normal and breath sounds normal. No respiratory distress. She has no wheezes. She has no rales.   Abdominal: Soft. Bowel sounds are normal. There is no tenderness.   Musculoskeletal: She  exhibits no edema.   Neurological: She is alert.   Skin: Skin is warm and dry. No rash noted. No erythema.   Psychiatric: She has a normal mood and affect.   Vitals reviewed.    Access: L AVF w/ good thrill/bruit    Non-OCF labs Sept  eKt/V 1.43  H/H 7.9/25.2  Ca/Phos 9.4/4.9  PTH 2650 (Aug)  Alb 3.2    Assessment:       1. ESRD (end stage renal disease)    2. Hypertensive kidney disease with end-stage renal disease    3. Liver replaced by transplant    4. Hyperparathyroidism, secondary renal    5. Anemia of chronic renal failure, stage 5        Plan:     Ms. Patel is a 27 year old woman with past medical history of liver transplant (1992 for hemangioendothelioma), malignant hypertension presenting for follow up of ESRD on home hemo (NxStage).  Patient initiated on hemodialysis October 2015, trained on home hemodialysis February 2016, now performing at home without difficulty (with main assistance from her sister).  Kt/V previously above goal, reduced treatments to 4x/wk, now near goal, will continue to monitor clearance.  Patient followed by Transplant for possible kidney transplantation.  - MRSA Bactermia: unknown etiology with TTE suggestive of MV endocarditis (refused JEFFREY), currently on 6 week course of vancomycin (with in-center HD).  - Hypertension: BP elevated, will consider increasing labetolol  - Anemia: Hgb previously at goal, continue erythropoetin therapy  - Bone/mineral metabolism: patient with secondary hyperparathyroidism, discussed low phosphorous diet; previously increased calcitriol, cinacalcet (had not received previously due to insurance/pharmacy)    Return to clinic monthly

## 2017-10-05 ENCOUNTER — TELEPHONE (OUTPATIENT)
Dept: TRANSPLANT | Facility: CLINIC | Age: 27
End: 2017-10-05

## 2017-10-05 ENCOUNTER — OFFICE VISIT (OUTPATIENT)
Dept: INTERNAL MEDICINE | Facility: CLINIC | Age: 27
End: 2017-10-05
Payer: MEDICARE

## 2017-10-05 ENCOUNTER — LAB VISIT (OUTPATIENT)
Dept: LAB | Facility: HOSPITAL | Age: 27
End: 2017-10-05
Attending: INTERNAL MEDICINE
Payer: MEDICARE

## 2017-10-05 VITALS
DIASTOLIC BLOOD PRESSURE: 90 MMHG | OXYGEN SATURATION: 99 % | BODY MASS INDEX: 20.39 KG/M2 | HEIGHT: 65 IN | SYSTOLIC BLOOD PRESSURE: 162 MMHG | WEIGHT: 122.38 LBS | TEMPERATURE: 98 F | HEART RATE: 84 BPM

## 2017-10-05 DIAGNOSIS — Z94.4 LIVER TRANSPLANTED: ICD-10-CM

## 2017-10-05 DIAGNOSIS — D84.9 IMMUNOSUPPRESSION: ICD-10-CM

## 2017-10-05 DIAGNOSIS — B95.62 MRSA BACTEREMIA: ICD-10-CM

## 2017-10-05 DIAGNOSIS — Z99.2 ESRD ON HEMODIALYSIS: Primary | Chronic | ICD-10-CM

## 2017-10-05 DIAGNOSIS — N18.6 ESRD ON HEMODIALYSIS: Primary | Chronic | ICD-10-CM

## 2017-10-05 DIAGNOSIS — T86.41 CHRONIC REJECTION OF LIVER TRANSPLANT: ICD-10-CM

## 2017-10-05 DIAGNOSIS — Z23 NEED FOR STREPTOCOCCUS PNEUMONIAE VACCINATION: ICD-10-CM

## 2017-10-05 DIAGNOSIS — I15.0 RENOVASCULAR HYPERTENSION: ICD-10-CM

## 2017-10-05 DIAGNOSIS — E44.0 MODERATE PROTEIN-CALORIE MALNUTRITION: ICD-10-CM

## 2017-10-05 DIAGNOSIS — R78.81 MRSA BACTEREMIA: ICD-10-CM

## 2017-10-05 LAB
ALBUMIN SERPL BCP-MCNC: 3.1 G/DL
ALP SERPL-CCNC: 660 U/L
ALT SERPL W/O P-5'-P-CCNC: 28 U/L
ANION GAP SERPL CALC-SCNC: 13 MMOL/L
AST SERPL-CCNC: 34 U/L
BASOPHILS # BLD AUTO: 0.02 K/UL
BASOPHILS NFR BLD: 0.5 %
BILIRUB SERPL-MCNC: 0.6 MG/DL
BUN SERPL-MCNC: 52 MG/DL
CALCIUM SERPL-MCNC: 9.3 MG/DL
CHLORIDE SERPL-SCNC: 105 MMOL/L
CO2 SERPL-SCNC: 19 MMOL/L
CREAT SERPL-MCNC: 11.6 MG/DL
DIFFERENTIAL METHOD: ABNORMAL
EOSINOPHIL # BLD AUTO: 0.4 K/UL
EOSINOPHIL NFR BLD: 10.7 %
ERYTHROCYTE [DISTWIDTH] IN BLOOD BY AUTOMATED COUNT: 15.3 %
EST. GFR  (AFRICAN AMERICAN): 5 ML/MIN/1.73 M^2
EST. GFR  (NON AFRICAN AMERICAN): 4 ML/MIN/1.73 M^2
GLUCOSE SERPL-MCNC: 93 MG/DL
HCT VFR BLD AUTO: 26.2 %
HGB BLD-MCNC: 8.7 G/DL
LYMPHOCYTES # BLD AUTO: 0.5 K/UL
LYMPHOCYTES NFR BLD: 14.1 %
MCH RBC QN AUTO: 27.7 PG
MCHC RBC AUTO-ENTMCNC: 33.2 G/DL
MCV RBC AUTO: 83 FL
MONOCYTES # BLD AUTO: 0.2 K/UL
MONOCYTES NFR BLD: 4.9 %
NEUTROPHILS # BLD AUTO: 2.7 K/UL
NEUTROPHILS NFR BLD: 69 %
PLATELET # BLD AUTO: 79 K/UL
PMV BLD AUTO: 9 FL
POTASSIUM SERPL-SCNC: 4.6 MMOL/L
PROT SERPL-MCNC: 7.4 G/DL
RBC # BLD AUTO: 3.14 M/UL
SODIUM SERPL-SCNC: 137 MMOL/L
WBC # BLD AUTO: 3.84 K/UL

## 2017-10-05 PROCEDURE — G0009 ADMIN PNEUMOCOCCAL VACCINE: HCPCS | Mod: PBBFAC

## 2017-10-05 PROCEDURE — 99214 OFFICE O/P EST MOD 30 MIN: CPT | Mod: S$PBB,,, | Performed by: INTERNAL MEDICINE

## 2017-10-05 PROCEDURE — 36415 COLL VENOUS BLD VENIPUNCTURE: CPT

## 2017-10-05 PROCEDURE — 85025 COMPLETE CBC W/AUTO DIFF WBC: CPT

## 2017-10-05 PROCEDURE — 90670 PCV13 VACCINE IM: CPT | Mod: PBBFAC

## 2017-10-05 PROCEDURE — 99213 OFFICE O/P EST LOW 20 MIN: CPT | Mod: PBBFAC,25 | Performed by: INTERNAL MEDICINE

## 2017-10-05 PROCEDURE — 99999 PR PBB SHADOW E&M-EST. PATIENT-LVL III: CPT | Mod: PBBFAC,,, | Performed by: INTERNAL MEDICINE

## 2017-10-05 PROCEDURE — 80197 ASSAY OF TACROLIMUS: CPT

## 2017-10-05 PROCEDURE — 80053 COMPREHEN METABOLIC PANEL: CPT

## 2017-10-05 NOTE — PROGRESS NOTES
Subjective:       Patient ID: Holly Patel is a 27 y.o. female.    Chief Complaint: Establish Care    Here to [facility].    Has finished abx for MRSA bacteremia.    Feels fine except for unintentional wt loss.    No f/c/ns, no skin rash, no abscess. No LAD. Good appetite and eating normally. Taking meds as rxed.      Review of Systems   Constitutional: Positive for unexpected weight change. Negative for activity change and appetite change.   Eyes: Negative for visual disturbance.   Respiratory: Negative for cough, chest tightness and shortness of breath.    Cardiovascular: Negative for chest pain.   Gastrointestinal: Negative for abdominal distention and abdominal pain.   Genitourinary: Negative for difficulty urinating, menstrual problem and urgency.        No breast lumps/masses/pain/nipple d/c in either breast     Skin: Negative for rash.       Objective:      Physical Exam   Constitutional: She is oriented to person, place, and time. She appears well-developed and well-nourished. No distress.   HENT:   Head: Normocephalic and atraumatic.   Eyes: Pupils are equal, round, and reactive to light. No scleral icterus.   Neck: Normal range of motion. No thyromegaly present.   Cardiovascular: Normal rate and regular rhythm.  Exam reveals no gallop and no friction rub.    Murmur (mild HSM murmur at apex) heard.  Pulmonary/Chest: Effort normal and breath sounds normal. No respiratory distress. She has no wheezes. She has no rales.   Abdominal: Soft. Bowel sounds are normal. She exhibits no distension and no mass. There is no tenderness. There is no rebound and no guarding.   Musculoskeletal: Normal range of motion. She exhibits no edema or tenderness.   L arm fistula thrill normal, nontender   Lymphadenopathy:     She has no cervical adenopathy.   Neurological: She is alert and oriented to person, place, and time.   Skin: She is not diaphoretic.   Psychiatric: She has a normal mood and affect. Her speech is  normal and behavior is normal. Cognition and memory are normal.       Assessment:       1. Need for Streptococcus pneumoniae vaccination        Plan:       Holly was seen today for [facility] care.    Diagnoses and all orders for this visit:    Here to [facility] care.    Recent MRSA bacteremia not sure source. Repeat TTE w/o any vegetation seen.    Not sure cause of her wt loss, no localizing symptoms. Possibly related to evolving liver disease (liver pathology noted from 6/17). She has f/u with liver team.    HTN not well controlled, occ misses HTH med doses later in day, she will f/u with nephro.    Due for pap, I will complete upon return visit.    Need for Streptococcus pneumoniae vaccination  -     (In Office Administered) Pneumococcal Conjugate Vaccine (13 Valent) (IM)  Patient Instructions   Make sure that you have had the flu shot.    Try twice per day nepro.  To help more with wt        Health Maintenance       Date Due Completion Date    Pneumococcal PCV13 (High Risk) (1 - PCV13 Required) 09/13/1991 ---    TETANUS VACCINE 09/13/2008 ---    Pneumococcal PPSV23 (High Risk) (1) 09/13/2008 ---    Pap Smear 09/13/2011 ---    Influenza Vaccine 08/01/2017 10/12/2015          Return in about 2 months (around 12/5/2017).

## 2017-10-05 NOTE — PROGRESS NOTES
Here to est care.    Has finished abx for MRSA bacteremia.    Feels fine except for unintentional wt loss.    No f/c/ns, no skin rash, no abscess. No LAD. Good appetite and eating normally. Taking meds as rxed.

## 2017-10-06 LAB — TACROLIMUS BLD-MCNC: 1.9 NG/ML

## 2017-10-09 ENCOUNTER — TELEPHONE (OUTPATIENT)
Dept: TRANSPLANT | Facility: CLINIC | Age: 27
End: 2017-10-09

## 2017-10-09 NOTE — TELEPHONE ENCOUNTER
----- Message from Lei Pinedo MD sent at 10/9/2017  1:34 PM CDT -----  tomorrow please    ----- Message -----  From: Violeta Francis RN  Sent: 10/9/2017   1:27 PM  To: Lei Pinedo MD    She says yes. When should she repeat labs?    Violeta    ----- Message -----  From: Lei Pinedo MD  Sent: 10/6/2017  11:12 AM  To: [facility] Post-Liver Transplant Clinical    Results reviewed  Is she taking it?

## 2017-10-09 NOTE — TELEPHONE ENCOUNTER
Pt states she takes prograf as directed. Notified Dr. Pinedo. Per MD, pt to repeat labs tomorrow.    Called pt, no answer. LVM requesting return call.

## 2017-10-09 NOTE — TELEPHONE ENCOUNTER
----- Message from Lei Pinedo MD sent at 10/6/2017 11:12 AM CDT -----  Results reviewed  Is she taking it?

## 2017-10-11 ENCOUNTER — TELEPHONE (OUTPATIENT)
Dept: TRANSPLANT | Facility: CLINIC | Age: 27
End: 2017-10-11

## 2017-10-11 NOTE — TELEPHONE ENCOUNTER
No return call from pt regarding need to repeat labs.    Called pt again, spoke with pt and she states she will repeat labs Friday.

## 2017-10-16 ENCOUNTER — TELEPHONE (OUTPATIENT)
Dept: TRANSPLANT | Facility: CLINIC | Age: 27
End: 2017-10-16

## 2017-10-16 ENCOUNTER — OFFICE VISIT (OUTPATIENT)
Dept: TRANSPLANT | Facility: CLINIC | Age: 27
End: 2017-10-16
Payer: MEDICARE

## 2017-10-16 VITALS
OXYGEN SATURATION: 100 % | RESPIRATION RATE: 18 BRPM | TEMPERATURE: 98 F | HEART RATE: 83 BPM | HEIGHT: 65 IN | WEIGHT: 123.25 LBS | SYSTOLIC BLOOD PRESSURE: 161 MMHG | DIASTOLIC BLOOD PRESSURE: 120 MMHG | BODY MASS INDEX: 20.54 KG/M2

## 2017-10-16 DIAGNOSIS — R78.81 MRSA BACTEREMIA: ICD-10-CM

## 2017-10-16 DIAGNOSIS — Z94.4 LIVER REPLACED BY TRANSPLANT: Chronic | ICD-10-CM

## 2017-10-16 DIAGNOSIS — Z29.89 PROPHYLACTIC IMMUNOTHERAPY: Primary | ICD-10-CM

## 2017-10-16 DIAGNOSIS — T86.41 CHRONIC REJECTION OF LIVER TRANSPLANT: ICD-10-CM

## 2017-10-16 DIAGNOSIS — B95.62 MRSA BACTEREMIA: ICD-10-CM

## 2017-10-16 DIAGNOSIS — Z94.4 LIVER TRANSPLANTED: Primary | ICD-10-CM

## 2017-10-16 PROCEDURE — 99213 OFFICE O/P EST LOW 20 MIN: CPT | Mod: PBBFAC | Performed by: INTERNAL MEDICINE

## 2017-10-16 PROCEDURE — 99999 PR PBB SHADOW E&M-EST. PATIENT-LVL III: CPT | Mod: PBBFAC,,, | Performed by: INTERNAL MEDICINE

## 2017-10-16 PROCEDURE — 99215 OFFICE O/P EST HI 40 MIN: CPT | Mod: S$PBB,,, | Performed by: INTERNAL MEDICINE

## 2017-10-16 NOTE — LETTER
October 16, 2017        Enrrique Moise  [address]  [facility] [address]  Phone: [phone]  Fax: [phone]             [facility]  [address]  [facility] [address]  Phone: [phone]   Patient: Holly Patel   MR Number: [id number]   YOB: 1990   Date of Visit: 10/16/2017       Dear Dr. Enrrique Moise    Thank you for referring Holly Patel to me for evaluation. Attached you will find relevant portions of my assessment and plan of care.    If you have questions, please do not hesitate to call me. I look forward to following Holly Patel along with you.    Sincerely,    Lei Pinedo MD    Enclosure    If you would like to receive this communication electronically, please contact [email or web address] or [phone] to request [employer] Link access.    [employer] Link is a tool which provides read-only access to select patient information with whom you have a relationship. Its easy to use and provides real time access to review your patients record including encounter summaries, notes, results, and demographic information.    If you feel you have received this communication in error or would no longer like to receive these types of communications, please e-mail [email or web address]

## 2017-10-16 NOTE — TELEPHONE ENCOUNTER
Called pt, states she missed lab Friday because she did not have a ride. States her mother is off Friday and can bring her to lab this Friday.      Also discussed that she is scheduled to see Dr. Pinedo today in clinic. Pt states she will have her cousin bring her. She will come at 2.  Dr. [place]en[place]ro ok with earlier time and Chani at clinic desk aware that pt will check in earlier.

## 2017-10-16 NOTE — PROGRESS NOTES
Subjective:       Patient ID: Holly Patel is a 27 y.o. female.    Chief Complaint: Liver Transplant Follow-up    HPI  I saw this 27 year old  lady in the liver transplant clinic. She is keeping well but she continues to have uncontrolled hypertension and end stage renal disease and is now on dialysis- home HD.    Today's BP is better because her medications (labetalol) were increased in the kidney clinic earlier today.    She gets her blood tests done irregularly and despite being on high dose tacrolimus, she has always had an undetectable or very low tacrolimus level.   Until now, she had been adamant that she took her medications.   Her recent Tac levels have been better although the last one was low..    She had high LFTs in June 2017 and a liver biopsy showed stage 2-3 fibrosis as well as evidence of chronic rejection.    She is currently on Tac 7mg BID and finally appears to be taking it more regularly.    Recent hospital admission with gram +ve baccilli in blood and fever.  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  Seen by Social Work and deemed that she needs to show compliance before she can be evaluated for kidney Tx.      Abdominal US: 4/12/2016  1.  Appropriate hepatic vascular flow.  2.  Satisfactory post op status.      Lab Results   Component Value Date    ALT 28 10/05/2017    AST 34 10/05/2017     (H) 06/27/2012    ALKPHOS 660 (H) 10/05/2017    BILITOT 0.6 10/05/2017     Past Medical History:   Diagnosis Date    Anemia in ESRD (end-stage renal disease) 10/12/2015    Chronic rejection of liver transplant 3/22/2016    ESRD on hemodialysis 9/30/2015    History of splenomegaly 4/12/2016    Immunosuppressed 8/5/2017    Iron deficiency anemia secondary to inadequate dietary iron intake 8/16/2017    She receives IV iron periodically at the [facility].   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     hemangioendothelioma s/p LTx ()    Moderate protein-calorie malnutrition 2017    MRSA bacteremia 2017    Prophylactic immunotherapy 2014    Renovascular hypertension 10/2/2015    Secondary hyperparathyroidism 2017    Thrombocytopenia 2016     Past Surgical History:   Procedure Laterality Date     SECTION      2     LIVER BIOPSY      LIVER TRANSPLANT  1992    TUBAL LIGATION       Current Outpatient Prescriptions   Medication Sig    amlodipine (NORVASC) 10 MG tablet Take 1 tablet (10 mg total) by mouth once daily.    cinacalcet (SENSIPAR) 60 MG Tab Take 1 tablet (60 mg total) by mouth daily with breakfast.    food supplemt, lactose-reduced (ENSURE ACTIVE HIGH PROTEIN) Liqd Take 236 mLs by mouth 2 (two) times daily.    labetalol (NORMODYNE) 200 MG tablet Take 1 tablet (200 mg total) by mouth 2 (two) times daily.    predniSONE (DELTASONE) 1 MG tablet Take 1 mg by mouth every other day.    tacrolimus (PROGRAF) 1 MG Cap Take 5 capsules (5 mg total) by mouth every 12 (twelve) hours.    triamcinolone acetonide 0.1% (KENALOG) 0.1 % ointment AAA on arms, legs, and neck bid x 1-2 wks then prn flares only (Patient taking differently: Apply to affected area(s) on arms, legs, and neck twice daily x 1-2 wks then as needed for flares only)     No current facility-administered medications for this visit.        Review of Systems   Constitutional: Negative for activity change, appetite change, chills, fatigue, fever and unexpected weight change.   HENT: Negative for ear pain, hearing loss, nosebleeds, sore throat and trouble swallowing.    Eyes: Negative for redness and visual disturbance.   Respiratory: Negative for cough, chest tightness, shortness of breath and wheezing.    Cardiovascular: Negative for chest pain and palpitations.   Gastrointestinal: Negative for abdominal distention, abdominal pain, blood in stool, constipation, diarrhea, nausea and vomiting.   Genitourinary:  "Negative for difficulty urinating, dysuria, frequency, hematuria and urgency.   Musculoskeletal: Negative for arthralgias, back pain, gait problem, joint swelling and myalgias.   Skin: Negative for rash.   Neurological: Negative for tremors, seizures, speech difficulty, weakness and headaches.   Hematological: Negative for adenopathy.   Psychiatric/Behavioral: Negative for confusion, decreased concentration and sleep disturbance. The patient is not nervous/anxious.          Objective:    BP (!) 161/120 (BP Location: Right arm, Patient Position: Sitting, BP Method: Medium (Automatic))   Pulse 83   Temp 98 °F (36.7 °C) (Oral)   Resp 18   Ht 5' 5" (1.651 m)   Wt 55.9 kg (123 lb 3.8 oz)   LMP 10/03/2017   SpO2 100%   BMI 20.51 kg/m²     Physical Exam   Constitutional: She appears well-developed and well-nourished. No distress.   HENT:   Head: Normocephalic.   Eyes: Pupils are equal, round, and reactive to light.   Neck: No JVD present. No tracheal deviation present. No thyromegaly present.   Cardiovascular: Normal rate, regular rhythm and normal heart sounds.    No murmur heard.  Pulmonary/Chest: Effort normal and breath sounds normal. No stridor.   Abdominal: Soft. Bowel sounds are normal. She exhibits no distension. There is no tenderness.   Musculoskeletal: She exhibits no edema.   Lymphadenopathy:        Head (right side): No submental, no submandibular, no tonsillar, no preauricular, no posterior auricular and no occipital adenopathy present.        Head (left side): No submental, no submandibular, no tonsillar, no preauricular, no posterior auricular and no occipital adenopathy present.     She has no cervical adenopathy.     She has no axillary adenopathy.   Neurological: She is alert. She has normal strength. She is not disoriented. No cranial nerve deficit or sensory deficit.   Skin: Skin is intact. No cyanosis.       Assessment:       1. Prophylactic immunotherapy    2. MRSA bacteremia    3. Liver " replaced by transplant    4. Chronic rejection of liver transplant        Plan:   1. Uncontrolled hypertension- nephrologist adjusts her medications  2. Continue with same dose tac and adjust according to levels. I warned her that if she remains non compliant, she will lose her liver from chronic rejection and will likely not get another transplant.    Clinic in 6 months..    Labs every 2 weeks.

## 2017-10-20 ENCOUNTER — LAB VISIT (OUTPATIENT)
Dept: LAB | Facility: HOSPITAL | Age: 27
End: 2017-10-20
Attending: INTERNAL MEDICINE
Payer: MEDICARE

## 2017-10-20 DIAGNOSIS — Z94.4 LIVER TRANSPLANTED: ICD-10-CM

## 2017-10-20 LAB
ALBUMIN SERPL BCP-MCNC: 2.9 G/DL
ALP SERPL-CCNC: 653 U/L
ALT SERPL W/O P-5'-P-CCNC: 35 U/L
ANION GAP SERPL CALC-SCNC: 12 MMOL/L
AST SERPL-CCNC: 44 U/L
BASOPHILS # BLD AUTO: 0.01 K/UL
BASOPHILS NFR BLD: 0.3 %
BILIRUB SERPL-MCNC: 0.5 MG/DL
BUN SERPL-MCNC: 62 MG/DL
CALCIUM SERPL-MCNC: 8.8 MG/DL
CHLORIDE SERPL-SCNC: 105 MMOL/L
CO2 SERPL-SCNC: 20 MMOL/L
CREAT SERPL-MCNC: 14 MG/DL
DIFFERENTIAL METHOD: ABNORMAL
EOSINOPHIL # BLD AUTO: 0.3 K/UL
EOSINOPHIL NFR BLD: 8.1 %
ERYTHROCYTE [DISTWIDTH] IN BLOOD BY AUTOMATED COUNT: 15.2 %
EST. GFR  (AFRICAN AMERICAN): 3.7 ML/MIN/1.73 M^2
EST. GFR  (NON AFRICAN AMERICAN): 3.2 ML/MIN/1.73 M^2
GLUCOSE SERPL-MCNC: 91 MG/DL
HCT VFR BLD AUTO: 24.5 %
HGB BLD-MCNC: 7.7 G/DL
IMM GRANULOCYTES # BLD AUTO: 0.01 K/UL
IMM GRANULOCYTES NFR BLD AUTO: 0.3 %
LYMPHOCYTES # BLD AUTO: 0.5 K/UL
LYMPHOCYTES NFR BLD: 15 %
MCH RBC QN AUTO: 26.6 PG
MCHC RBC AUTO-ENTMCNC: 31.4 G/DL
MCV RBC AUTO: 85 FL
MONOCYTES # BLD AUTO: 0.1 K/UL
MONOCYTES NFR BLD: 3.6 %
NEUTROPHILS # BLD AUTO: 2.4 K/UL
NEUTROPHILS NFR BLD: 72.7 %
NRBC BLD-RTO: 0 /100 WBC
PLATELET # BLD AUTO: 73 K/UL
PMV BLD AUTO: 9.9 FL
POTASSIUM SERPL-SCNC: 4.9 MMOL/L
PROT SERPL-MCNC: 7.2 G/DL
RBC # BLD AUTO: 2.89 M/UL
SODIUM SERPL-SCNC: 137 MMOL/L
WBC # BLD AUTO: 3.34 K/UL

## 2017-10-20 PROCEDURE — 80197 ASSAY OF TACROLIMUS: CPT

## 2017-10-20 PROCEDURE — 80053 COMPREHEN METABOLIC PANEL: CPT

## 2017-10-20 PROCEDURE — 36415 COLL VENOUS BLD VENIPUNCTURE: CPT | Mod: PO

## 2017-10-20 PROCEDURE — 85025 COMPLETE CBC W/AUTO DIFF WBC: CPT

## 2017-10-21 LAB — TACROLIMUS BLD-MCNC: 3.4 NG/ML

## 2017-10-25 ENCOUNTER — TELEPHONE (OUTPATIENT)
Dept: TRANSPLANT | Facility: CLINIC | Age: 27
End: 2017-10-25

## 2017-10-25 ENCOUNTER — OFFICE VISIT (OUTPATIENT)
Dept: NEPHROLOGY | Facility: CLINIC | Age: 27
End: 2017-10-25
Payer: MEDICARE

## 2017-10-25 VITALS
SYSTOLIC BLOOD PRESSURE: 180 MMHG | HEART RATE: 88 BPM | WEIGHT: 126.56 LBS | HEIGHT: 65 IN | OXYGEN SATURATION: 100 % | DIASTOLIC BLOOD PRESSURE: 100 MMHG | BODY MASS INDEX: 21.09 KG/M2

## 2017-10-25 DIAGNOSIS — N25.81 HYPERPARATHYROIDISM, SECONDARY RENAL: ICD-10-CM

## 2017-10-25 DIAGNOSIS — Z94.4 LIVER REPLACED BY TRANSPLANT: ICD-10-CM

## 2017-10-25 DIAGNOSIS — N18.5 ANEMIA OF CHRONIC RENAL FAILURE, STAGE 5: ICD-10-CM

## 2017-10-25 DIAGNOSIS — N18.6 ESRD (END STAGE RENAL DISEASE): Primary | ICD-10-CM

## 2017-10-25 DIAGNOSIS — N18.6 HYPERTENSIVE KIDNEY DISEASE WITH END-STAGE RENAL DISEASE: ICD-10-CM

## 2017-10-25 DIAGNOSIS — I12.0 HYPERTENSIVE KIDNEY DISEASE WITH END-STAGE RENAL DISEASE: ICD-10-CM

## 2017-10-25 DIAGNOSIS — D63.1 ANEMIA OF CHRONIC RENAL FAILURE, STAGE 5: ICD-10-CM

## 2017-10-25 PROCEDURE — 99212 OFFICE O/P EST SF 10 MIN: CPT | Mod: PBBFAC,PO | Performed by: INTERNAL MEDICINE

## 2017-10-25 PROCEDURE — 99499 UNLISTED E&M SERVICE: CPT | Mod: S$PBB,,, | Performed by: INTERNAL MEDICINE

## 2017-10-25 PROCEDURE — 99999 PR PBB SHADOW E&M-EST. PATIENT-LVL II: CPT | Mod: PBBFAC,,, | Performed by: INTERNAL MEDICINE

## 2017-10-25 NOTE — TELEPHONE ENCOUNTER
Letter sent, labs stable and no medication changes are needed. Repeat labs due 11/10/17 per Dr. Pinedo.

## 2017-10-25 NOTE — LETTER
October 25, 2017    Holly Patel  [address] [place] [id number]          Dear Michaelroya Granadosnes:  MRN: [id number]    Your lab results were stable.  There are no medicine changes.  Please have your labs drawn again on 11/10/17.  Please continue to stay compliant with your medications.     If you cannot have your labs drawn on the scheduled date, call me or Jaki to reschedule at [phone]. Please reschedule before the lab date. We made the appointment on a Friday to help you with your transportation issues.    Sincerely,    Violeta Francis, RN, BSN  Liver Transplant Coordinator  [facility]  [address] [place] [id number]  [phone]

## 2017-10-30 RX ORDER — LABETALOL 300 MG/1
600 TABLET, FILM COATED ORAL EVERY 12 HOURS
Qty: 120 TABLET | Refills: 11 | Status: ON HOLD | OUTPATIENT
Start: 2017-10-30 | End: 2018-01-05

## 2017-10-30 RX ORDER — CLONIDINE HYDROCHLORIDE 0.1 MG/1
0.1 TABLET ORAL 2 TIMES DAILY
Qty: 60 TABLET | Refills: 0 | OUTPATIENT
Start: 2017-10-30 | End: 2018-01-01

## 2017-10-30 RX ORDER — CLONIDINE HYDROCHLORIDE 0.1 MG/1
0.1 TABLET ORAL 2 TIMES DAILY
Qty: 60 TABLET | Refills: 11 | Status: ON HOLD | OUTPATIENT
Start: 2017-10-30 | End: 2018-01-05

## 2017-10-30 NOTE — PROGRESS NOTES
Subjective:       Patient ID: Holly Patel is a 27 y.o. Black or  female who presents for follow-up evaluation of ESRD    HPI    Ms. Patel is a 27 year old woman with past medical history of liver transplant (1992 for hemangioendothelioma), malignant hypertension presenting for follow up of ESRD on home hemo (NxStage).  Patient initiated on hemodialysis October 2015, trained on home hemodialysis February 2016, performing at home without difficulty (with main assistance from her sister).  Patient recently admitted for MRSA septicemia (of unknown etiology with TTE suggestive of MV endocarditis), completed 6 week course of vancomycin (with in-center HD), now performing HD at home again.  Patient reports blood pressures have been elevated.  She otherwise denies any fever, chest pain, shortness of breath, abdominal pain, diarrhea, dysuria/hematuria.    Review of Systems   Constitutional: Negative for appetite change, fatigue and fever.   Respiratory: Negative for chest tightness and shortness of breath.    Cardiovascular: Negative for chest pain and leg swelling.   Gastrointestinal: Negative for abdominal pain, constipation, diarrhea, nausea and vomiting.   Genitourinary: Negative for difficulty urinating, dysuria, flank pain, frequency, hematuria and urgency.   Musculoskeletal: Negative for arthralgias, joint swelling and myalgias.   Skin: Negative for rash and wound.   Neurological: Negative for dizziness, weakness and light-headedness.   All other systems reviewed and are negative.      Objective:      Physical Exam   Constitutional: She appears well-developed and well-nourished.   Cardiovascular: Normal rate, regular rhythm and normal heart sounds.  Exam reveals no gallop and no friction rub.    No murmur heard.  Pulmonary/Chest: Effort normal and breath sounds normal. No respiratory distress. She has no wheezes. She has no rales.   Abdominal: Soft. Bowel sounds are normal. There is no  tenderness.   Musculoskeletal: She exhibits no edema.   Neurological: She is alert.   Skin: Skin is warm and dry. No rash noted. No erythema.   Psychiatric: She has a normal mood and affect.   Vitals reviewed.    Access: L AVF w/ good thrill/bruit    Non-OCF labs Oct  eKt/V 2.0  H/H 8.0/23.6  Ca/Phos 9.0/5.9  PTH 2901  Alb 3.4    Assessment:       1. ESRD (end stage renal disease)    2. Hypertensive kidney disease with end-stage renal disease    3. Liver replaced by transplant    4. Hyperparathyroidism, secondary renal    5. Anemia of chronic renal failure, stage 5        Plan:     Ms. Patel is a 27 year old woman with past medical history of liver transplant (1992 for hemangioendothelioma), malignant hypertension presenting for follow up of ESRD on home hemo (NxStage).  Patient initiated on hemodialysis October 2015, trained on home hemodialysis February 2016, now performing at home without difficulty (with main assistance from her sister).  Kt/V previously above goal, reduced treatments to 4x/wk, now at goal, will continue to monitor clearance.  Patient followed by Transplant for possible kidney transplantation.  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  - Hypertension: BP elevated, will consider adding labetolol, stressed adherence to med regimen  - Anemia: Hgb previously at goal, improving continue erythropoetin therapy  - Bone/mineral metabolism: patient with secondary hyperparathyroidism, discussed low phosphorous diet; previously increased calcitriol, cinacalcet (had not received previously due to insurance/pharmacy), stressed adherence to med regimen, patient voiced understanding    Return to clinic monthly

## 2017-11-13 ENCOUNTER — LAB VISIT (OUTPATIENT)
Dept: LAB | Facility: HOSPITAL | Age: 27
End: 2017-11-13
Attending: INTERNAL MEDICINE
Payer: MEDICARE

## 2017-11-13 ENCOUNTER — TELEPHONE (OUTPATIENT)
Dept: TRANSPLANT | Facility: CLINIC | Age: 27
End: 2017-11-13

## 2017-11-13 DIAGNOSIS — Z94.4 LIVER TRANSPLANTED: ICD-10-CM

## 2017-11-13 LAB
ALBUMIN SERPL BCP-MCNC: 2.8 G/DL
ALP SERPL-CCNC: 621 U/L
ALT SERPL W/O P-5'-P-CCNC: 50 U/L
ANION GAP SERPL CALC-SCNC: 11 MMOL/L
AST SERPL-CCNC: 58 U/L
BASOPHILS # BLD AUTO: 0.01 K/UL
BASOPHILS NFR BLD: 0.2 %
BILIRUB SERPL-MCNC: 0.6 MG/DL
BUN SERPL-MCNC: 56 MG/DL
CALCIUM SERPL-MCNC: 8.6 MG/DL
CHLORIDE SERPL-SCNC: 102 MMOL/L
CO2 SERPL-SCNC: 23 MMOL/L
CREAT SERPL-MCNC: 12 MG/DL
DIFFERENTIAL METHOD: ABNORMAL
EOSINOPHIL # BLD AUTO: 0.3 K/UL
EOSINOPHIL NFR BLD: 6.5 %
ERYTHROCYTE [DISTWIDTH] IN BLOOD BY AUTOMATED COUNT: 15.6 %
EST. GFR  (AFRICAN AMERICAN): 4.4 ML/MIN/1.73 M^2
EST. GFR  (NON AFRICAN AMERICAN): 3.8 ML/MIN/1.73 M^2
GLUCOSE SERPL-MCNC: 95 MG/DL
HCT VFR BLD AUTO: 25 %
HGB BLD-MCNC: 8 G/DL
IMM GRANULOCYTES # BLD AUTO: 0.02 K/UL
IMM GRANULOCYTES NFR BLD AUTO: 0.5 %
LYMPHOCYTES # BLD AUTO: 0.7 K/UL
LYMPHOCYTES NFR BLD: 17.9 %
MCH RBC QN AUTO: 27.4 PG
MCHC RBC AUTO-ENTMCNC: 32 G/DL
MCV RBC AUTO: 86 FL
MONOCYTES # BLD AUTO: 0.2 K/UL
MONOCYTES NFR BLD: 5.6 %
NEUTROPHILS # BLD AUTO: 2.9 K/UL
NEUTROPHILS NFR BLD: 69.3 %
NRBC BLD-RTO: 0 /100 WBC
PLATELET # BLD AUTO: 68 K/UL
PMV BLD AUTO: 10.5 FL
POTASSIUM SERPL-SCNC: 4.3 MMOL/L
PROT SERPL-MCNC: 7.3 G/DL
RBC # BLD AUTO: 2.92 M/UL
SODIUM SERPL-SCNC: 136 MMOL/L
WBC # BLD AUTO: 4.13 K/UL

## 2017-11-13 PROCEDURE — 85025 COMPLETE CBC W/AUTO DIFF WBC: CPT

## 2017-11-13 PROCEDURE — 80053 COMPREHEN METABOLIC PANEL: CPT

## 2017-11-13 PROCEDURE — 36415 COLL VENOUS BLD VENIPUNCTURE: CPT | Mod: PO

## 2017-11-13 PROCEDURE — 80197 ASSAY OF TACROLIMUS: CPT

## 2017-11-13 NOTE — TELEPHONE ENCOUNTER
----- Message from Jaki Mejia sent at 11/10/2017  4:00 PM CST -----  Contact: patient   Done, I spoke to Holly and rescheduled her labs to 11/13/17 at her request    ----- Message -----  From: Estrella Cohen RN  Sent: 11/10/2017   9:01 AM  To: Jaki Mejia        ----- Message -----  From: Kala Schultz  Sent: 11/10/2017   8:44 AM  To: [facility]    Patient would like a call back to reschedule her lab to Monday   Please call

## 2017-11-14 ENCOUNTER — TELEPHONE (OUTPATIENT)
Dept: TRANSPLANT | Facility: CLINIC | Age: 27
End: 2017-11-14

## 2017-11-14 LAB — TACROLIMUS BLD-MCNC: <1.5 NG/ML

## 2017-11-14 NOTE — TELEPHONE ENCOUNTER
Spoke with pt, advised that labs remain elevated and prograf undetectable. Advised pt that she needs to take her prograf dose everyday, twice a day.  Advised of the consequences of continuing to miss doses.  Pt stated she understood. She will repeat labs, Friday, 12/15/17.

## 2017-11-14 NOTE — TELEPHONE ENCOUNTER
----- Message from Lei Pinedo MD sent at 11/14/2017  2:05 PM CST -----  Results reviewed  Encourage her to take her tac

## 2017-11-18 ENCOUNTER — HOSPITAL ENCOUNTER (EMERGENCY)
Facility: OTHER | Age: 27
Discharge: HOME | End: 2017-11-18
Attending: EMERGENCY MEDICINE
Payer: MEDICARE

## 2017-11-18 VITALS
SYSTOLIC BLOOD PRESSURE: 174 MMHG | RESPIRATION RATE: 18 BRPM | TEMPERATURE: 99 F | DIASTOLIC BLOOD PRESSURE: 126 MMHG | OXYGEN SATURATION: 98 % | WEIGHT: 122 LBS | HEART RATE: 97 BPM | HEIGHT: 65 IN | BODY MASS INDEX: 20.33 KG/M2

## 2017-11-18 DIAGNOSIS — R31.9 URINARY TRACT INFECTION WITH HEMATURIA, SITE UNSPECIFIED: Primary | ICD-10-CM

## 2017-11-18 DIAGNOSIS — N39.0 URINARY TRACT INFECTION WITH HEMATURIA, SITE UNSPECIFIED: Primary | ICD-10-CM

## 2017-11-18 LAB
B-HCG UR QL: NEGATIVE
BILIRUBIN, POC UA: NEGATIVE
BLOOD, POC UA: ABNORMAL
CLARITY, POC UA: CLEAR
COLOR, POC UA: YELLOW
CTP QC/QA: YES
GLUCOSE, POC UA: NEGATIVE
KETONES, POC UA: NEGATIVE
LEUKOCYTE EST, POC UA: ABNORMAL
NITRITE, POC UA: NEGATIVE
PH UR STRIP: 6.5 [PH]
PROTEIN, POC UA: ABNORMAL
SPECIFIC GRAVITY, POC UA: 1.02
UROBILINOGEN, POC UA: 0.2 E.U./DL

## 2017-11-18 PROCEDURE — 99283 EMERGENCY DEPT VISIT LOW MDM: CPT | Mod: 25

## 2017-11-18 PROCEDURE — 81025 URINE PREGNANCY TEST: CPT | Performed by: EMERGENCY MEDICINE

## 2017-11-18 PROCEDURE — 81003 URINALYSIS AUTO W/O SCOPE: CPT

## 2017-11-18 RX ORDER — CEPHALEXIN 500 MG/1
500 CAPSULE ORAL 4 TIMES DAILY
Qty: 20 CAPSULE | Refills: 0 | Status: SHIPPED | OUTPATIENT
Start: 2017-11-18 | End: 2017-11-23

## 2017-11-19 NOTE — ED NOTES
Pt supine in bed, alert and oriented, reports pain to back, denies trauma or heavy lifting, states she has had pain for a week, pt is a renal dialysis pt has a shunt to the rt lower arm, good, pulse, thrill and bruit

## 2017-11-19 NOTE — ED PROVIDER NOTES
"Encounter Date: 11/18/2017       History     Chief Complaint   Patient presents with    Back Pain     pt states, " My back has been hurting for one week."     The history is provided by the patient.   Back Pain    This is a new problem. The current episode started several days ago. The problem occurs constantly. The problem has been unchanged. The pain is associated with no known injury. The pain is present in the lumbar spine. The quality of the pain is described as aching. The pain does not radiate. The pain is at a severity of 3/10. The symptoms are aggravated by bending, twisting and certain positions. The pain is the same all the time. Pertinent negatives include no chest pain, no fever, no numbness, no weight loss, no headaches, no abdominal pain, no abdominal swelling, no bowel incontinence, no perianal numbness, no bladder incontinence, no dysuria, no pelvic pain, no leg pain, no paresthesias, no paresis and no weakness. She has tried nothing for the symptoms. Risk factors: Multiple medical problems.     Review of patient's allergies indicates:   Allergen Reactions    Chloral hydrate      Other reaction(s): Hallucinations  Other reaction(s): Hives    Hydrocodone Other (See Comments)     Mental status changes     Past Medical History:   Diagnosis Date    Anemia in ESRD (end-stage renal disease) 10/12/2015    Chronic rejection of liver transplant 3/22/2016    ESRD on hemodialysis 9/30/2015    History of splenomegaly 4/12/2016    Immunosuppressed 8/5/2017    Iron deficiency anemia secondary to inadequate dietary iron intake 8/16/2017    She receives [facility] iron [facility] at the [facility].   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)    Moderate protein-calorie malnutrition 8/16/2017    MRSA bacteremia 8/6/2017    Prophylactic immunotherapy 8/4/2014    Renovascular hypertension 10/2/2015    Secondary hyperparathyroidism 8/5/2017    Thrombocytopenia " 2016     Past Surgical History:   Procedure Laterality Date     SECTION      2     LIVER BIOPSY      LIVER TRANSPLANT  1992    TUBAL LIGATION       Family History   Problem Relation Age of Onset    Hypertension Mother     Hypertension Father     Melanoma Neg Hx      Social History   Substance Use Topics    Smoking status: Never Smoker    Smokeless tobacco: Never Used    Alcohol use No     Review of Systems   Constitutional: Negative.  Negative for fever and weight loss.   HENT: Negative.    Eyes: Negative.    Respiratory: Negative.    Cardiovascular: Negative.  Negative for chest pain.   Gastrointestinal: Negative.  Negative for abdominal pain and bowel incontinence.   Endocrine: Negative.    Genitourinary: Negative.  Negative for bladder incontinence, dysuria and pelvic pain.   Musculoskeletal: Positive for back pain.   Skin: Negative.    Allergic/Immunologic: Negative.    Neurological: Negative.  Negative for weakness, numbness, headaches and paresthesias.   Hematological: Negative.    Psychiatric/Behavioral: Negative.    All other systems reviewed and are negative.      Physical Exam     Initial Vitals [17 1828]   BP Pulse Resp Temp SpO2   (!) 209/137 96 16 98.5 °F (36.9 °C) 100 %      MAP       161         Physical Exam    Nursing note and vitals reviewed.  Constitutional: Vital signs are normal. She appears well-developed. She is active and cooperative.   HENT:   Head: Normocephalic and atraumatic.   Eyes: Conjunctivae, EOM and lids are normal. Pupils are equal, round, and reactive to light.   Neck: Trachea normal and full passive range of motion without pain. Neck supple. No thyroid mass present.   Cardiovascular: Normal rate, regular rhythm, S1 normal, S2 normal, normal heart sounds, intact distal pulses and normal pulses.   Abdominal: Soft. Normal appearance, normal aorta and bowel sounds are normal. She exhibits no shifting dullness, no distension, no pulsatile liver, no  fluid wave, no abdominal bruit, no ascites, no pulsatile midline mass and no mass. There is no hepatosplenomegaly. There is no tenderness. There is no rigidity, no rebound, no guarding, no CVA tenderness, no tenderness at McBurney's point and negative Garcia's sign. No hernia.       Musculoskeletal: Normal range of motion.        Lumbar back: She exhibits pain. She exhibits normal range of motion, no tenderness, no bony tenderness, no swelling, no edema, no deformity, no laceration, no spasm and normal pulse.        Back:    Lymphadenopathy:     She has no axillary adenopathy.   Neurological: She is alert and oriented to person, place, and time.   Skin: Skin is warm, dry and intact.   Psychiatric: She has a normal mood and affect. Her speech is normal and behavior is normal. Judgment and thought content normal. Cognition and memory are normal.         ED Course   Procedures  Labs Reviewed   POCT URINE PREGNANCY   POCT URINALYSIS W/O SCOPE                 Results for orders placed or performed during the hospital encounter of 11/18/17   POCT urine pregnancy   Result Value Ref Range    POC Preg Test, Ur Negative Negative     Acceptable Yes    POCT URINALYSIS W/O SCOPE   Result Value Ref Range    Glucose, UA Negative (NG)     Bilirubin, UA Negative (NG)     Ketones, UA Negative (NG)     Spec Grav UA 1.020     Blood, UA 2+ (A)     PH, UA 6.5     Protein, UA 3+ (A)     Urobilinogen, UA 0.2 E.U./dL    Nitrite, UA Negative (NG)     Leukocytes, UA 1+ (A)     Color, UA Yellow     Clarity, UA Clear                       ED Course      Clinical Impression:   The encounter diagnosis was Urinary tract infection with hematuria, site unspecified.                           Philip Ridley MD  11/18/17 1910

## 2017-11-22 ENCOUNTER — OFFICE VISIT (OUTPATIENT)
Dept: NEPHROLOGY | Facility: CLINIC | Age: 27
End: 2017-11-22
Payer: MEDICARE

## 2017-11-22 VITALS
WEIGHT: 119.5 LBS | HEART RATE: 75 BPM | OXYGEN SATURATION: 100 % | HEIGHT: 65 IN | DIASTOLIC BLOOD PRESSURE: 80 MMHG | BODY MASS INDEX: 19.91 KG/M2 | SYSTOLIC BLOOD PRESSURE: 130 MMHG

## 2017-11-22 DIAGNOSIS — N18.6 ESRD (END STAGE RENAL DISEASE): Primary | ICD-10-CM

## 2017-11-22 DIAGNOSIS — N18.6 HYPERTENSIVE KIDNEY DISEASE WITH END-STAGE RENAL DISEASE: ICD-10-CM

## 2017-11-22 DIAGNOSIS — N25.81 HYPERPARATHYROIDISM, SECONDARY RENAL: ICD-10-CM

## 2017-11-22 DIAGNOSIS — N18.5 ANEMIA OF CHRONIC RENAL FAILURE, STAGE 5: ICD-10-CM

## 2017-11-22 DIAGNOSIS — D63.1 ANEMIA OF CHRONIC RENAL FAILURE, STAGE 5: ICD-10-CM

## 2017-11-22 DIAGNOSIS — Z94.4 LIVER REPLACED BY TRANSPLANT: ICD-10-CM

## 2017-11-22 DIAGNOSIS — I12.0 HYPERTENSIVE KIDNEY DISEASE WITH END-STAGE RENAL DISEASE: ICD-10-CM

## 2017-11-22 PROCEDURE — 99499 UNLISTED E&M SERVICE: CPT | Mod: S$PBB,,, | Performed by: INTERNAL MEDICINE

## 2017-11-22 PROCEDURE — 99213 OFFICE O/P EST LOW 20 MIN: CPT | Mod: PBBFAC,PO | Performed by: INTERNAL MEDICINE

## 2017-11-22 PROCEDURE — 99999 PR PBB SHADOW E&M-EST. PATIENT-LVL III: CPT | Mod: PBBFAC,,, | Performed by: INTERNAL MEDICINE

## 2017-11-28 NOTE — PROGRESS NOTES
Subjective:       Patient ID: Holly Patel is a 27 y.o. Black or  female who presents for follow-up evaluation of ESRD    HPI    Ms. Patel is a 27 year old woman with past medical history of liver transplant (1992 for hemangioendothelioma), malignant hypertension presenting for follow up of ESRD on home hemo (NxStage).  Patient initiated on hemodialysis October 2015, trained on home hemodialysis February 2016, performing at home without difficulty (with main assistance from her sister).  Patient recently seen by Liver Transplant, expressed concern for med adherence, has follow up scheduled.  Patient reports blood pressures have been variable.  She otherwise denies any fever, chest pain, shortness of breath, abdominal pain, diarrhea, dysuria/hematuria.    Review of Systems   Constitutional: Negative for appetite change, fatigue and fever.   Respiratory: Negative for chest tightness and shortness of breath.    Cardiovascular: Negative for chest pain and leg swelling.   Gastrointestinal: Negative for abdominal pain, constipation, diarrhea, nausea and vomiting.   Genitourinary: Negative for difficulty urinating, dysuria, flank pain, frequency, hematuria and urgency.   Musculoskeletal: Negative for arthralgias, joint swelling and myalgias.   Skin: Negative for rash and wound.   Neurological: Negative for dizziness, weakness and light-headedness.   All other systems reviewed and are negative.      Objective:      Physical Exam   Constitutional: She appears well-developed and well-nourished.   Cardiovascular: Normal rate, regular rhythm and normal heart sounds.  Exam reveals no gallop and no friction rub.    No murmur heard.  Pulmonary/Chest: Effort normal and breath sounds normal. No respiratory distress. She has no wheezes. She has no rales.   Abdominal: Soft. Bowel sounds are normal. There is no tenderness.   Musculoskeletal: She exhibits no edema.   Neurological: She is alert.   Skin: Skin is  warm and dry. No rash noted. No erythema.   Psychiatric: She has a normal mood and affect.   Vitals reviewed.    Access: L AVF w/ good thrill/bruit    Non-OCF labs Nov  eKt/V 2.7  H/H 8.6/25.4  Ca/Phos 9.4/5.3  PTH 2634  Alb 3.4    Assessment:       1. ESRD (end stage renal disease)    2. Hypertensive kidney disease with end-stage renal disease    3. Liver replaced by transplant    4. Hyperparathyroidism, secondary renal    5. Anemia of chronic renal failure, stage 5        Plan:     Ms. Patel is a 27 year old woman with past medical history of liver transplant (1992 for hemangioendothelioma), malignant hypertension presenting for follow up of ESRD on home hemo (NxStage).  Patient initiated on hemodialysis October 2015, trained on home hemodialysis February 2016, now performing at home without difficulty (with main assistance from her sister).  Kt/V previously above goal, reduced treatments to 4x/wk, now at goal, will continue to monitor clearance.  Patient followed by Transplant for possible kidney transplantation, h/o liver transplant, recently discussed med adherence with Liver Transplant team.  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  - Hypertension: BP improved, will consider adding labetolol, stressed adherence to med regimen  - Anemia: Hgb previously at goal, improving continue erythropoetin therapy  - Bone/mineral metabolism: patient with secondary hyperparathyroidism, discussed low phosphorous diet; previously increased calcitriol, cinacalcet (had not received previously due to insurance/pharmacy), stressed adherence to med regimen, patient voiced understanding    Return to clinic monthly

## 2017-12-07 ENCOUNTER — OFFICE VISIT (OUTPATIENT)
Dept: INTERNAL MEDICINE | Facility: CLINIC | Age: 27
End: 2017-12-07
Payer: MEDICARE

## 2017-12-07 VITALS
SYSTOLIC BLOOD PRESSURE: 158 MMHG | BODY MASS INDEX: 21.16 KG/M2 | HEIGHT: 65 IN | HEART RATE: 83 BPM | DIASTOLIC BLOOD PRESSURE: 96 MMHG | OXYGEN SATURATION: 100 % | WEIGHT: 127 LBS

## 2017-12-07 DIAGNOSIS — Z99.2 ESRD ON HEMODIALYSIS: Chronic | ICD-10-CM

## 2017-12-07 DIAGNOSIS — Z94.4 LIVER REPLACED BY TRANSPLANT: Chronic | ICD-10-CM

## 2017-12-07 DIAGNOSIS — N18.6 ESRD ON HEMODIALYSIS: Chronic | ICD-10-CM

## 2017-12-07 DIAGNOSIS — I15.0 RENOVASCULAR HYPERTENSION: ICD-10-CM

## 2017-12-07 PROCEDURE — 99999 PR PBB SHADOW E&M-EST. PATIENT-LVL III: CPT | Mod: PBBFAC,,, | Performed by: INTERNAL MEDICINE

## 2017-12-07 PROCEDURE — 99213 OFFICE O/P EST LOW 20 MIN: CPT | Mod: PBBFAC | Performed by: INTERNAL MEDICINE

## 2017-12-07 PROCEDURE — 99214 OFFICE O/P EST MOD 30 MIN: CPT | Mod: S$PBB,,, | Performed by: INTERNAL MEDICINE

## 2017-12-07 NOTE — PROGRESS NOTES
Subjective:       Patient ID: Holly Patel is a 27 y.o. female.    Chief Complaint: Follow-up    Still losing wt, taking Nepro daily. Denies f/c/ns, no n/v. appetote is OK. Normal urination w/o burning. No longer back pains after recent URI was treated.    No new other symptoms.    HD has been controlled.      Review of Systems   Constitutional: Negative for activity change, appetite change and unexpected weight change.   Eyes: Negative for visual disturbance.   Respiratory: Negative for cough, chest tightness and shortness of breath.    Cardiovascular: Negative for chest pain.   Gastrointestinal: Negative for abdominal distention and abdominal pain.   Genitourinary: Negative for difficulty urinating, menstrual problem and urgency.        No breast lumps/masses/pain/nipple d/c in either breast     Skin: Negative for rash.       Objective:      Physical Exam   Constitutional: She is oriented to person, place, and time. She appears well-developed and well-nourished. No distress.   HENT:   Head: Normocephalic and atraumatic.   Eyes: Pupils are equal, round, and reactive to light. No scleral icterus.   Neck: Normal range of motion. No thyromegaly present.   Cardiovascular: Normal rate and regular rhythm.  Exam reveals no gallop and no friction rub.    Murmur (mild HSM murmur at apex) heard.  Pulmonary/Chest: Effort normal and breath sounds normal. No respiratory distress. She has no wheezes. She has no rales.   Abdominal: Soft. Bowel sounds are normal. She exhibits no distension and no mass. There is no tenderness. There is no rebound and no guarding.   Musculoskeletal: Normal range of motion. She exhibits no edema or tenderness.   L arm fistula thrill normal, nontender   Lymphadenopathy:     She has no cervical adenopathy.   Neurological: She is alert and oriented to person, place, and time.   Skin: She is not diaphoretic.   Psychiatric: She has a normal mood and affect. Her speech is normal and behavior is  normal. Cognition and memory are normal.       Assessment:       1. Liver replaced by transplant    2. ESRD on hemodialysis    3. Renovascular hypertension        Plan:       Holly was seen today for follow-up.    Diagnoses and all orders for this visit:    Liver replaced by transplant  I think her wt loss over the last 6 mos has been from chronic liver trans rejection, I do not see any other sources for inability to gain back her wt.  I have noted that her wt has been stable in almost 2 years in our system.  Has upcoming labs with hepatology    ESRD on hemodialysis    Renovascular hypertension  She has not been taking clonidine, advised that it was sent in 10/30.      Health Maintenance       Date Due Completion Date    TETANUS VACCINE 09/13/2008 ---    Pap Smear 09/13/2011 ---    Pneumococcal PPSV23 (High Risk) (1) 11/30/2017 ---      Due for pap, offered today but she declines since menstruation, 3 mo f/u and if menstruation then r/s    Return in about 3 months (around 3/7/2018).

## 2017-12-15 ENCOUNTER — TELEPHONE (OUTPATIENT)
Dept: TRANSPLANT | Facility: CLINIC | Age: 27
End: 2017-12-15

## 2017-12-15 ENCOUNTER — LAB VISIT (OUTPATIENT)
Dept: LAB | Facility: HOSPITAL | Age: 27
End: 2017-12-15
Attending: INTERNAL MEDICINE
Payer: MEDICARE

## 2017-12-15 DIAGNOSIS — Z94.4 LIVER TRANSPLANTED: ICD-10-CM

## 2017-12-15 LAB
ALBUMIN SERPL BCP-MCNC: 2.6 G/DL
ALP SERPL-CCNC: 568 U/L
ALT SERPL W/O P-5'-P-CCNC: 26 U/L
ANION GAP SERPL CALC-SCNC: 12 MMOL/L
AST SERPL-CCNC: 31 U/L
BASOPHILS # BLD AUTO: 0.01 K/UL
BASOPHILS NFR BLD: 0.3 %
BILIRUB SERPL-MCNC: 0.7 MG/DL
BUN SERPL-MCNC: 75 MG/DL
CALCIUM SERPL-MCNC: 8.8 MG/DL
CHLORIDE SERPL-SCNC: 105 MMOL/L
CO2 SERPL-SCNC: 19 MMOL/L
CREAT SERPL-MCNC: 15.9 MG/DL
DIFFERENTIAL METHOD: ABNORMAL
EOSINOPHIL # BLD AUTO: 0.2 K/UL
EOSINOPHIL NFR BLD: 5.8 %
ERYTHROCYTE [DISTWIDTH] IN BLOOD BY AUTOMATED COUNT: 14.8 %
EST. GFR  (AFRICAN AMERICAN): 3.1 ML/MIN/1.73 M^2
EST. GFR  (NON AFRICAN AMERICAN): 2.7 ML/MIN/1.73 M^2
GLUCOSE SERPL-MCNC: 129 MG/DL
HCT VFR BLD AUTO: 26.5 %
HGB BLD-MCNC: 8.4 G/DL
IMM GRANULOCYTES # BLD AUTO: 0.01 K/UL
IMM GRANULOCYTES NFR BLD AUTO: 0.3 %
LYMPHOCYTES # BLD AUTO: 0.5 K/UL
LYMPHOCYTES NFR BLD: 12.9 %
MCH RBC QN AUTO: 26.8 PG
MCHC RBC AUTO-ENTMCNC: 31.7 G/DL
MCV RBC AUTO: 84 FL
MONOCYTES # BLD AUTO: 0.2 K/UL
MONOCYTES NFR BLD: 4 %
NEUTROPHILS # BLD AUTO: 3 K/UL
NEUTROPHILS NFR BLD: 76.7 %
NRBC BLD-RTO: 0 /100 WBC
PLATELET # BLD AUTO: 50 K/UL
PMV BLD AUTO: ABNORMAL FL
POTASSIUM SERPL-SCNC: 4.8 MMOL/L
PROT SERPL-MCNC: 6.9 G/DL
RBC # BLD AUTO: 3.14 M/UL
SODIUM SERPL-SCNC: 136 MMOL/L
WBC # BLD AUTO: 3.96 K/UL

## 2017-12-15 PROCEDURE — 80053 COMPREHEN METABOLIC PANEL: CPT

## 2017-12-15 PROCEDURE — 80197 ASSAY OF TACROLIMUS: CPT

## 2017-12-15 PROCEDURE — 36415 COLL VENOUS BLD VENIPUNCTURE: CPT | Mod: PO

## 2017-12-15 PROCEDURE — 85025 COMPLETE CBC W/AUTO DIFF WBC: CPT

## 2017-12-15 NOTE — TELEPHONE ENCOUNTER
Called pt, advised that labs are due today. Pt states she remembers and is getting ready to go now.

## 2017-12-16 LAB — TACROLIMUS BLD-MCNC: <1.5 NG/ML

## 2017-12-17 ENCOUNTER — HOSPITAL ENCOUNTER (EMERGENCY)
Facility: HOSPITAL | Age: 27
Discharge: HOME | End: 2017-12-17
Attending: EMERGENCY MEDICINE
Payer: MEDICARE

## 2017-12-17 VITALS
TEMPERATURE: 99 F | WEIGHT: 126 LBS | HEIGHT: 65 IN | BODY MASS INDEX: 20.99 KG/M2 | SYSTOLIC BLOOD PRESSURE: 206 MMHG | OXYGEN SATURATION: 100 % | HEART RATE: 98 BPM | DIASTOLIC BLOOD PRESSURE: 132 MMHG | RESPIRATION RATE: 18 BRPM

## 2017-12-17 DIAGNOSIS — M79.10 MYALGIA: Primary | ICD-10-CM

## 2017-12-17 DIAGNOSIS — Z94.4 LIVER REPLACED BY TRANSPLANT: Chronic | ICD-10-CM

## 2017-12-17 DIAGNOSIS — N18.6 ESRD ON HEMODIALYSIS: Chronic | ICD-10-CM

## 2017-12-17 DIAGNOSIS — Z99.2 ESRD ON HEMODIALYSIS: Chronic | ICD-10-CM

## 2017-12-17 DIAGNOSIS — D84.9 IMMUNOSUPPRESSION: ICD-10-CM

## 2017-12-17 LAB
ALBUMIN SERPL BCP-MCNC: 2.8 G/DL
ALP SERPL-CCNC: 579 U/L
ALT SERPL W/O P-5'-P-CCNC: 29 U/L
ANION GAP SERPL CALC-SCNC: 14 MMOL/L
AST SERPL-CCNC: 35 U/L
B-HCG UR QL: NEGATIVE
BACTERIA #/AREA URNS AUTO: ABNORMAL /HPF
BASOPHILS # BLD AUTO: 0.01 K/UL
BASOPHILS NFR BLD: 0.2 %
BILIRUB SERPL-MCNC: 0.6 MG/DL
BILIRUB UR QL STRIP: NEGATIVE
BUN SERPL-MCNC: 87 MG/DL
CALCIUM SERPL-MCNC: 9 MG/DL
CHLORIDE SERPL-SCNC: 105 MMOL/L
CLARITY UR REFRACT.AUTO: ABNORMAL
CO2 SERPL-SCNC: 17 MMOL/L
COLOR UR AUTO: YELLOW
CREAT SERPL-MCNC: 17.6 MG/DL
CTP QC/QA: YES
DIFFERENTIAL METHOD: ABNORMAL
EOSINOPHIL # BLD AUTO: 0.2 K/UL
EOSINOPHIL NFR BLD: 3.3 %
ERYTHROCYTE [DISTWIDTH] IN BLOOD BY AUTOMATED COUNT: 14.6 %
EST. GFR  (AFRICAN AMERICAN): 2.8 ML/MIN/1.73 M^2
EST. GFR  (NON AFRICAN AMERICAN): 2.4 ML/MIN/1.73 M^2
FLUAV AG SPEC QL IA: NEGATIVE
FLUBV AG SPEC QL IA: NEGATIVE
GLUCOSE SERPL-MCNC: 140 MG/DL
GLUCOSE UR QL STRIP: ABNORMAL
HCT VFR BLD AUTO: 27.4 %
HGB BLD-MCNC: 8.8 G/DL
HGB UR QL STRIP: ABNORMAL
HYALINE CASTS UR QL AUTO: 0 /LPF
IMM GRANULOCYTES # BLD AUTO: 0.03 K/UL
IMM GRANULOCYTES NFR BLD AUTO: 0.5 %
KETONES UR QL STRIP: NEGATIVE
LACTATE SERPL-SCNC: 1 MMOL/L
LEUKOCYTE ESTERASE UR QL STRIP: ABNORMAL
LYMPHOCYTES # BLD AUTO: 0.7 K/UL
LYMPHOCYTES NFR BLD: 12.7 %
MCH RBC QN AUTO: 26.2 PG
MCHC RBC AUTO-ENTMCNC: 32.1 G/DL
MCV RBC AUTO: 82 FL
MICROSCOPIC COMMENT: ABNORMAL
MONOCYTES # BLD AUTO: 0.3 K/UL
MONOCYTES NFR BLD: 5.7 %
NEUTROPHILS # BLD AUTO: 4.5 K/UL
NEUTROPHILS NFR BLD: 77.6 %
NITRITE UR QL STRIP: NEGATIVE
NRBC BLD-RTO: 0 /100 WBC
PH UR STRIP: 6 [PH] (ref 5–8)
PLATELET # BLD AUTO: 55 K/UL
PMV BLD AUTO: ABNORMAL FL
POTASSIUM SERPL-SCNC: 4.5 MMOL/L
PROT SERPL-MCNC: 7.6 G/DL
PROT UR QL STRIP: ABNORMAL
RBC # BLD AUTO: 3.36 M/UL
RBC #/AREA URNS AUTO: 62 /HPF (ref 0–4)
SODIUM SERPL-SCNC: 136 MMOL/L
SP GR UR STRIP: 1.01 (ref 1–1.03)
SPECIMEN SOURCE: NORMAL
SQUAMOUS #/AREA URNS AUTO: 9 /HPF
URN SPEC COLLECT METH UR: ABNORMAL
UROBILINOGEN UR STRIP-ACNC: NEGATIVE EU/DL
WBC # BLD AUTO: 5.82 K/UL
WBC #/AREA URNS AUTO: 51 /HPF (ref 0–5)
WBC CLUMPS UR QL AUTO: ABNORMAL

## 2017-12-17 PROCEDURE — 85025 COMPLETE CBC W/AUTO DIFF WBC: CPT

## 2017-12-17 PROCEDURE — 87147 CULTURE TYPE IMMUNOLOGIC: CPT

## 2017-12-17 PROCEDURE — 83605 ASSAY OF LACTIC ACID: CPT

## 2017-12-17 PROCEDURE — 87040 BLOOD CULTURE FOR BACTERIA: CPT

## 2017-12-17 PROCEDURE — 87400 INFLUENZA A/B EACH AG IA: CPT | Mod: 59

## 2017-12-17 PROCEDURE — 99284 EMERGENCY DEPT VISIT MOD MDM: CPT | Mod: 25

## 2017-12-17 PROCEDURE — 80053 COMPREHEN METABOLIC PANEL: CPT

## 2017-12-17 PROCEDURE — 81001 URINALYSIS AUTO W/SCOPE: CPT

## 2017-12-17 PROCEDURE — 87088 URINE BACTERIA CULTURE: CPT

## 2017-12-17 PROCEDURE — 99285 EMERGENCY DEPT VISIT HI MDM: CPT | Mod: ,,, | Performed by: EMERGENCY MEDICINE

## 2017-12-17 PROCEDURE — 87086 URINE CULTURE/COLONY COUNT: CPT

## 2017-12-17 PROCEDURE — 93005 ELECTROCARDIOGRAM TRACING: CPT

## 2017-12-17 PROCEDURE — 81025 URINE PREGNANCY TEST: CPT | Performed by: EMERGENCY MEDICINE

## 2017-12-17 PROCEDURE — 93010 ELECTROCARDIOGRAM REPORT: CPT | Mod: ,,, | Performed by: INTERNAL MEDICINE

## 2017-12-17 RX ORDER — BENZONATATE 100 MG/1
100 CAPSULE ORAL 3 TIMES DAILY PRN
Qty: 20 CAPSULE | Refills: 0 | Status: SHIPPED | OUTPATIENT
Start: 2017-12-17 | End: 2017-12-27

## 2017-12-18 ENCOUNTER — TELEPHONE (OUTPATIENT)
Dept: TRANSPLANT | Facility: CLINIC | Age: 27
End: 2017-12-18

## 2017-12-18 NOTE — LETTER
December 18, 2017    Holly Patel  [address] [place] [id number]          Dear Holly Patel:  MRN: [id number]    Your lab results were stable but your Prograf level remains undetected in your blood stream. There are no medicine changes but please make sure you are taking your medications as prescribed. Please have your labs drawn again on Friday, 1/12/18.      If you cannot have your labs drawn on the scheduled date, it is your responsibility to call the transplant department to reschedule.  To reschedule or make an appointment, please as to speak to or leave a message for my assistant, Jaki Chavis, at [phone].  When leaving a message for Palmira Pollack, or myself, we ask that you leave a brief message regarding your request.    Sincerely,    Violeta Francis, RN, BSN  Liver Transplant Coordinator  [facility]  [address] [place] [id number]  [phone]

## 2017-12-18 NOTE — ED PROVIDER NOTES
Encounter Date: 12/17/2017    SCRIBE #1 NOTE: I, Roberto Prescott, am scribing for, and in the presence of,  Dr. Jameson. I have scribed the following portions of the note - Other sections scribed: HPI, ROS, PE.       History     Chief Complaint   Patient presents with    Generalized Body Aches     started this am      Time patient was seen by the provider: 7:33 PM    The patient is a 27 y.o. female with past medical hx of: Anemia in ESRD, splenomegaly, liver transplant, and ESRD on hemodialysis that presents to the ED with a complaint of a cough, mild chest pain, and  generalized body aches. Patient claims the coughing and chest pain started 2 days ago, but the generalized body aches are of onset earlier today. She reports a fever of 99.9. Patient is currently on dialysis, with her last treatment being Friday and is receiving dialysis via her left arm. Denies SOB, abdominal pain, dysuria, leg swelling, sore throat, and any rashes.       The history is provided by the patient, medical records and a parent.     Review of patient's allergies indicates:   Allergen Reactions    Chloral hydrate      Other reaction(s): Hallucinations  Other reaction(s): Hives    Hydrocodone Other (See Comments)     Mental status changes     Past Medical History:   Diagnosis Date    Anemia in ESRD (end-stage renal disease) 10/12/2015    Chronic rejection of liver transplant 3/22/2016    ESRD on hemodialysis 9/30/2015    History of splenomegaly 4/12/2016    Immunosuppressed 8/5/2017    Iron deficiency anemia secondary to inadequate dietary iron intake 8/16/2017    [facility]   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)    Moderate protein-calorie malnutrition 8/16/2017    MRSA bacteremia 8/6/2017    Prophylactic immunotherapy 8/4/2014    Renovascular hypertension 10/2/2015    Secondary hyperparathyroidism 8/5/2017    Thrombocytopenia 4/12/2016      Past Surgical History:   Procedure Laterality Date     SECTION      2     LIVER BIOPSY      LIVER TRANSPLANT  1992    TUBAL LIGATION       Family History   Problem Relation Age of Onset    Hypertension Mother     Hypertension Father     Melanoma Neg Hx      Social History   Substance Use Topics    Smoking status: Never Smoker    Smokeless tobacco: Never Used    Alcohol use No     Review of Systems   Constitutional: Positive for fever (99.9).        Generalized body aches.    HENT: Negative for sore throat.    Respiratory: Positive for cough. Negative for shortness of breath.    Cardiovascular: Positive for chest pain (attributing to cough).   Gastrointestinal: Negative for abdominal pain and nausea.   Genitourinary: Negative for dysuria.   Musculoskeletal: Negative for back pain.        Negative leg swelling.     Skin: Negative for rash.   Neurological: Negative for weakness.   Hematological: Does not bruise/bleed easily.       Physical Exam     Initial Vitals [17 1750]   BP Pulse Resp Temp SpO2   (!) 196/109 108 18 98.9 °F (37.2 °C) 98 %      MAP       138         Physical Exam    Nursing note and vitals reviewed.  Constitutional: No distress.   HENT:   Oropharynx with  no erythema. No exudates.    Eyes: Pupils are equal, round, and reactive to light.   Cardiovascular: Normal rate.   Murmur heard.   Systolic murmur is present   Pulmonary/Chest:   Lungs clear to auscultation     Abdominal: Bowel sounds are normal. There is no rebound and no guarding.   Minimal tenderness to deep palpation in RUQ.    Musculoskeletal:   No leg swelling. Left forearm has left AV fistula with good thrill.     Neurological: She is alert and oriented to person, place, and time.         ED Course   Procedures  Labs Reviewed   CBC W/ AUTO DIFFERENTIAL - Abnormal; Notable for the following:        Result Value    RBC 3.36 (*)     Hemoglobin 8.8 (*)     Hematocrit 27.4 (*)     MCH 26.2 (*)     RDW 14.6 (*)      Platelets 55 (*)     Lymph # 0.7 (*)     Gran% 77.6 (*)     Lymph% 12.7 (*)     All other components within normal limits   COMPREHENSIVE METABOLIC PANEL - Abnormal; Notable for the following:     CO2 17 (*)     Glucose 140 (*)     BUN, Bld 87 (*)     Creatinine 17.6 (*)     Albumin 2.8 (*)     Alkaline Phosphatase 579 (*)     eGFR if  2.8 (*)     eGFR if non  2.4 (*)     All other components within normal limits   URINALYSIS, REFLEX TO URINE CULTURE - Abnormal; Notable for the following:     Appearance, UA Hazy (*)     Protein, UA 3+ (*)     Glucose, UA 1+ (*)     Occult Blood UA 2+ (*)     Leukocytes, UA 3+ (*)     All other components within normal limits    Narrative:     Preferred Collection Type->Urine, Clean Catch   URINALYSIS MICROSCOPIC - Abnormal; Notable for the following:     RBC, UA 62 (*)     WBC, UA 51 (*)     Bacteria, UA Few (*)     All other components within normal limits    Narrative:     Preferred Collection Type->Urine, Clean Catch   CULTURE, BLOOD   CULTURE, BLOOD   CULTURE, URINE   INFLUENZA A AND B ANTIGEN   LACTIC ACID, PLASMA   POCT URINE PREGNANCY     EKG Readings: (Independently Interpreted)   Sinus rhythm 97 BPM. LVH. No acute ischemic changes.        X-Rays:   Independently Interpreted Readings:   Chest X-Ray: Mild pulmonary edema, No infiltrates     Medical Decision Making:   History:   Old Medical Records: I decided to obtain old medical records.  Initial Assessment:   26 y/o F esrd on HD presents with cough and mild chest pain associated with body aches.   Ddx: pneumonia, viral URI, bronchitis  Routine blood work and CXR  Reassess  Independently Interpreted Test(s):   I have ordered and independently interpreted X-rays - see prior notes.  I have ordered and independently interpreted EKG Reading(s) - see prior notes  Clinical Tests:   Lab Tests: Ordered and Reviewed  Radiological Study: Ordered and Reviewed  Medical Tests: Ordered and Reviewed             Scribe Attestation:   Scribe #1: I performed the above scribed service and the documentation accurately describes the services I performed. I attest to the accuracy of the note.    Attending Attestation:             Attending ED Notes:   8:48 PM  Blood work unremarkable. Urine poor specimen. Patient without UTI like symptoms. Urine culture sent. Blood pressure is high, but patient states this is normal. No headache and no other complaints. Will discharge home, but routine precautions provided. Patient is to be dialyzed tomorrow.           ED Course      Clinical Impression:   There were no encounter diagnoses.    Disposition:   Disposition: Discharged                        Michelle Jameson MD  12/30/17 1810

## 2017-12-18 NOTE — ED NOTES
Patient identifiers for Holly Patel checked and correct.  LOC: Patient is awake, alert, and aware of environment with an appropriate affect. Patient is oriented x 3 and speaking appropriately.  APPEARANCE: Patient resting comfortably and in no acute distress. Patient is clean and well groomed, patient's clothing is properly fastened.  SKIN: The skin is warm and dry. Patient has normal skin turgor and moist mucus membrances. Skin is intact; no bruising or breakdown noted.  MUSKULOSKELETAL: Patient is moving all extremities well, no obvious deformities noted. Pulses intact.   RESPIRATORY: Airway is open and patent. Respirations are spontaneous and non-labored with normal effort and rate.  CARDIAC: Patient has a normal rate and rhythm. No peripheral edema noted. Capillary refill < 3 seconds.  ABDOMEN: No distention noted. Bowel sounds active in all 4 quadrants. Soft and non-tender upon palpation.  NEUROLOGICAL: PERRL. Facial expression is symmetrical. Hand grasps are equal bilaterally. Normal sensation in all extremities when touched with finger.  Allergies reported:   Review of patient's allergies indicates:   Allergen Reactions    Chloral hydrate      Other reaction(s): Hallucinations  Other reaction(s): Hives    Hydrocodone Other (See Comments)     Mental status changes

## 2017-12-18 NOTE — DISCHARGE INSTRUCTIONS
Take your medications as prescribed.  Follow up with your doctor  Return to ED for fevers, abdominal pain, burning with urination, vomiting or any other concerns

## 2017-12-18 NOTE — ED TRIAGE NOTES
Pt c/o of all over body aches that started today. Pt states that has had a non-productive cough for the past week and that she is coughing so much that her right chest wall hurts. Pt also states that she has had diarrhea for the past two days. Pt denies blood in stool.

## 2017-12-19 LAB — BACTERIA UR CULT: NORMAL

## 2017-12-21 ENCOUNTER — HOSPITAL ENCOUNTER (EMERGENCY)
Facility: OTHER | Age: 27
Discharge: HOME | End: 2017-12-21
Attending: EMERGENCY MEDICINE
Payer: MEDICARE

## 2017-12-21 VITALS
HEIGHT: 65 IN | TEMPERATURE: 98 F | SYSTOLIC BLOOD PRESSURE: 178 MMHG | DIASTOLIC BLOOD PRESSURE: 98 MMHG | RESPIRATION RATE: 16 BRPM | OXYGEN SATURATION: 99 % | HEART RATE: 92 BPM | BODY MASS INDEX: 20.99 KG/M2 | WEIGHT: 126 LBS

## 2017-12-21 DIAGNOSIS — J40 BRONCHITIS: ICD-10-CM

## 2017-12-21 DIAGNOSIS — R59.1 LYMPHADENOPATHY: Primary | ICD-10-CM

## 2017-12-21 LAB
CTP QC/QA: YES
S PYO RRNA THROAT QL PROBE: NEGATIVE

## 2017-12-21 PROCEDURE — 87880 STREP A ASSAY W/OPTIC: CPT

## 2017-12-21 PROCEDURE — 99283 EMERGENCY DEPT VISIT LOW MDM: CPT

## 2017-12-21 RX ORDER — AMOXICILLIN AND CLAVULANATE POTASSIUM 875; 125 MG/1; MG/1
1 TABLET, FILM COATED ORAL 2 TIMES DAILY
Qty: 14 TABLET | Refills: 0 | Status: SHIPPED | OUTPATIENT
Start: 2017-12-21 | End: 2018-01-03

## 2017-12-21 NOTE — ED PROVIDER NOTES
Encounter Date: 2017       History     Chief Complaint   Patient presents with    swelling to face and neck     pt reports swelling to face since this morning, nodules palpated in bilateral neck     This patient has a myriad of medical problems presents to the emergency department with cough sore throat and low-grade temperature at home.  The patient is mainly concerned about swelling in her bilateral neck area      The history is provided by the patient.   Sore Throat   This is a new problem. The current episode started yesterday. The problem occurs constantly. The problem has not changed since onset.Pertinent negatives include no chest pain, no abdominal pain and no headaches. Nothing aggravates the symptoms. Nothing relieves the symptoms. She has tried nothing for the symptoms.     Review of patient's allergies indicates:   Allergen Reactions    Chloral hydrate      Other reaction(s): Hallucinations  Other reaction(s): Hives    Hydrocodone Other (See Comments)     Mental status changes     Past Medical History:   Diagnosis Date    Anemia in ESRD (end-stage renal disease) 10/12/2015    Chronic rejection of liver transplant 3/22/2016    ESRD on hemodialysis 2015    History of splenomegaly 2016    Immunosuppressed 2017    Iron deficiency anemia secondary to inadequate dietary iron intake 2017    [facility].   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 ()    Moderate protein-calorie malnutrition 2017    MRSA bacteremia 2017    Prophylactic immunotherapy 2014    Renovascular hypertension 10/2/2015    Secondary hyperparathyroidism 2017    Thrombocytopenia 2016     Past Surgical History:   Procedure Laterality Date     SECTION      2     LIVER BIOPSY      LIVER TRANSPLANT  1992    TUBAL LIGATION       Family History   Problem Relation Age of Onset    Hypertension  Mother     Hypertension Father     Melanoma Neg Hx      Social History   Substance Use Topics    Smoking status: Never Smoker    Smokeless tobacco: Never Used    Alcohol use No     Review of Systems   Constitutional: Negative.    HENT: Positive for sore throat.    Eyes: Negative.    Respiratory: Negative.    Cardiovascular: Negative.  Negative for chest pain.   Gastrointestinal: Negative.  Negative for abdominal pain.   Endocrine: Negative.    Genitourinary: Negative.    Musculoskeletal: Negative.    Skin: Negative.    Allergic/Immunologic: Negative.    Neurological: Negative.  Negative for headaches.   Hematological: Negative.    Psychiatric/Behavioral: Negative.    All other systems reviewed and are negative.      Physical Exam     Initial Vitals [12/21/17 1018]   BP Pulse Resp Temp SpO2   (!) 188/130 98 19 97.9 °F (36.6 °C) 100 %      MAP       149.33         Physical Exam    Nursing note and vitals reviewed.  Constitutional: Vital signs are normal. She appears well-developed. She is active and cooperative.   HENT:   Head: Normocephalic and atraumatic.   Right Ear: Tympanic membrane normal.   Left Ear: Tympanic membrane normal.   Nose: Nose normal.   Mouth/Throat: Uvula is midline, oropharynx is clear and moist and mucous membranes are normal.       Eyes: Conjunctivae, EOM and lids are normal. Pupils are equal, round, and reactive to light.   Neck: Trachea normal and full passive range of motion without pain. Neck supple. No thyroid mass present. No spinous process tenderness and no muscular tenderness present. No edema, no erythema and normal range of motion present. No neck rigidity. No Brudzinski's sign and no Kernig's sign noted.   Cardiovascular: Normal rate, regular rhythm, S1 normal, S2 normal, normal heart sounds, intact distal pulses and normal pulses.   Abdominal: Soft. Normal appearance, normal aorta and bowel sounds are normal.   Musculoskeletal: Normal range of motion.   Lymphadenopathy:     She  has cervical adenopathy.        Right cervical: Superficial cervical adenopathy present. No deep cervical and no posterior cervical adenopathy present.       Left cervical: Superficial cervical adenopathy present. No deep cervical and no posterior cervical adenopathy present.     She has no axillary adenopathy.        Right axillary: No pectoral and no lateral adenopathy present.        Left axillary: No pectoral and no lateral adenopathy present.  Neurological: She is alert and oriented to person, place, and time.   Skin: Skin is warm, dry and intact.   Psychiatric: She has a normal mood and affect. Her speech is normal and behavior is normal. Judgment and thought content normal. Cognition and memory are normal.         ED Course   Procedures  Labs Reviewed - No data to display              Results for orders placed or performed during the hospital encounter of 12/21/17   POCT Rapid Strep A (manual)   Result Value Ref Range    Rapid Strep A Screen Negative Negative     Acceptable Yes                       ED Course      Clinical Impression:   The primary encounter diagnosis was Lymphadenopathy. A diagnosis of Bronchitis was also pertinent to this visit.                           Philip Ridley MD  12/21/17 4637

## 2017-12-22 ENCOUNTER — HOSPITAL ENCOUNTER (EMERGENCY)
Facility: HOSPITAL | Age: 27
Discharge: HOME | End: 2017-12-22
Attending: EMERGENCY MEDICINE
Payer: MEDICARE

## 2017-12-22 VITALS
BODY MASS INDEX: 20.99 KG/M2 | DIASTOLIC BLOOD PRESSURE: 84 MMHG | WEIGHT: 126 LBS | HEART RATE: 90 BPM | OXYGEN SATURATION: 100 % | SYSTOLIC BLOOD PRESSURE: 143 MMHG | RESPIRATION RATE: 16 BRPM | TEMPERATURE: 98 F | HEIGHT: 65 IN

## 2017-12-22 DIAGNOSIS — Z94.4 LIVER REPLACED BY TRANSPLANT: Chronic | ICD-10-CM

## 2017-12-22 DIAGNOSIS — Z99.2 ESRD ON HEMODIALYSIS: Chronic | ICD-10-CM

## 2017-12-22 DIAGNOSIS — N18.6 ESRD (END STAGE RENAL DISEASE): ICD-10-CM

## 2017-12-22 DIAGNOSIS — N18.6 ESRD ON HEMODIALYSIS: Chronic | ICD-10-CM

## 2017-12-22 DIAGNOSIS — B34.9 VIRAL ILLNESS: Primary | ICD-10-CM

## 2017-12-22 DIAGNOSIS — D84.9 IMMUNOSUPPRESSION: ICD-10-CM

## 2017-12-22 LAB
ALBUMIN SERPL BCP-MCNC: 3 G/DL
ALP SERPL-CCNC: 642 U/L
ALT SERPL W/O P-5'-P-CCNC: 35 U/L
ANION GAP SERPL CALC-SCNC: 15 MMOL/L
AST SERPL-CCNC: 35 U/L
B-HCG UR QL: NEGATIVE
BACTERIA BLD CULT: NORMAL
BACTERIA BLD CULT: NORMAL
BASOPHILS # BLD AUTO: 0.01 K/UL
BASOPHILS NFR BLD: 0.2 %
BILIRUB SERPL-MCNC: 0.7 MG/DL
BILIRUB SERPL-MCNC: NORMAL MG/DL
BLOOD URINE, POC: NORMAL
BNP SERPL-MCNC: 131 PG/ML
BUN SERPL-MCNC: 74 MG/DL
CALCIUM SERPL-MCNC: 9.4 MG/DL
CHLORIDE SERPL-SCNC: 103 MMOL/L
CO2 SERPL-SCNC: 20 MMOL/L
COLOR, POC UA: NORMAL
CREAT SERPL-MCNC: 16.6 MG/DL
DIFFERENTIAL METHOD: ABNORMAL
EOSINOPHIL # BLD AUTO: 0.3 K/UL
EOSINOPHIL NFR BLD: 6.6 %
ERYTHROCYTE [DISTWIDTH] IN BLOOD BY AUTOMATED COUNT: 14.7 %
EST. GFR  (AFRICAN AMERICAN): 3 ML/MIN/1.73 M^2
EST. GFR  (NON AFRICAN AMERICAN): 2.6 ML/MIN/1.73 M^2
FLUAV AG SPEC QL IA: NEGATIVE
FLUBV AG SPEC QL IA: NEGATIVE
GLUCOSE SERPL-MCNC: 102 MG/DL
GLUCOSE UR QL STRIP: NORMAL
HCT VFR BLD AUTO: 31.4 %
HETEROPH AB SERPL QL IA: NEGATIVE
HGB BLD-MCNC: 10.3 G/DL
IMM GRANULOCYTES # BLD AUTO: 0.04 K/UL
IMM GRANULOCYTES NFR BLD AUTO: 0.8 %
KETONES UR QL STRIP: NORMAL
LEUKOCYTE ESTERASE URINE, POC: NORMAL
LYMPHOCYTES # BLD AUTO: 1.1 K/UL
LYMPHOCYTES NFR BLD: 21.6 %
MCH RBC QN AUTO: 25.8 PG
MCHC RBC AUTO-ENTMCNC: 32.8 G/DL
MCV RBC AUTO: 79 FL
MONOCYTES # BLD AUTO: 0.2 K/UL
MONOCYTES NFR BLD: 3.7 %
NEUTROPHILS # BLD AUTO: 3.3 K/UL
NEUTROPHILS NFR BLD: 67.1 %
NITRITE, POC UA: NORMAL
NRBC BLD-RTO: 0 /100 WBC
PH, POC UA: NORMAL
PLATELET # BLD AUTO: 94 K/UL
PMV BLD AUTO: ABNORMAL FL
POTASSIUM SERPL-SCNC: 4.8 MMOL/L
PROT SERPL-MCNC: 8.4 G/DL
PROTEIN, POC: NORMAL
RBC # BLD AUTO: 4 M/UL
SODIUM SERPL-SCNC: 138 MMOL/L
SPECIFIC GRAVITY, POC UA: NORMAL
SPECIMEN SOURCE: NORMAL
TROPONIN I SERPL DL<=0.01 NG/ML-MCNC: 0.04 NG/ML
TSH SERPL DL<=0.005 MIU/L-ACNC: 2.88 UIU/ML
UROBILINOGEN, POC UA: NORMAL
WBC # BLD AUTO: 4.86 K/UL

## 2017-12-22 PROCEDURE — 87400 INFLUENZA A/B EACH AG IA: CPT

## 2017-12-22 PROCEDURE — 93010 ELECTROCARDIOGRAM REPORT: CPT | Mod: ,,, | Performed by: INTERNAL MEDICINE

## 2017-12-22 PROCEDURE — 84484 ASSAY OF TROPONIN QUANT: CPT

## 2017-12-22 PROCEDURE — 93005 ELECTROCARDIOGRAM TRACING: CPT

## 2017-12-22 PROCEDURE — 85025 COMPLETE CBC W/AUTO DIFF WBC: CPT

## 2017-12-22 PROCEDURE — 99285 EMERGENCY DEPT VISIT HI MDM: CPT | Mod: GC,,, | Performed by: EMERGENCY MEDICINE

## 2017-12-22 PROCEDURE — 86308 HETEROPHILE ANTIBODY SCREEN: CPT

## 2017-12-22 PROCEDURE — 80053 COMPREHEN METABOLIC PANEL: CPT

## 2017-12-22 PROCEDURE — 83880 ASSAY OF NATRIURETIC PEPTIDE: CPT

## 2017-12-22 PROCEDURE — 81025 URINE PREGNANCY TEST: CPT

## 2017-12-22 PROCEDURE — 99284 EMERGENCY DEPT VISIT MOD MDM: CPT | Mod: 25

## 2017-12-22 PROCEDURE — 25000003 PHARM REV CODE 250: Performed by: STUDENT IN AN ORGANIZED HEALTH CARE EDUCATION/TRAINING PROGRAM

## 2017-12-22 PROCEDURE — 84443 ASSAY THYROID STIM HORMONE: CPT

## 2017-12-22 PROCEDURE — 25500020 PHARM REV CODE 255: Performed by: EMERGENCY MEDICINE

## 2017-12-22 RX ORDER — LABETALOL 100 MG/1
300 TABLET, FILM COATED ORAL
Status: COMPLETED | OUTPATIENT
Start: 2017-12-22 | End: 2017-12-22

## 2017-12-22 RX ORDER — AMLODIPINE BESYLATE 10 MG/1
10 TABLET ORAL
Status: COMPLETED | OUTPATIENT
Start: 2017-12-22 | End: 2017-12-22

## 2017-12-22 RX ORDER — CLONIDINE HYDROCHLORIDE 0.1 MG/1
0.1 TABLET ORAL
Status: COMPLETED | OUTPATIENT
Start: 2017-12-22 | End: 2017-12-22

## 2017-12-22 RX ADMIN — AMLODIPINE BESYLATE 10 MG: 10 TABLET ORAL at 09:12

## 2017-12-22 RX ADMIN — IOHEXOL 75 ML: 350 INJECTION, SOLUTION INTRAVENOUS at 11:12

## 2017-12-22 RX ADMIN — CLONIDINE HYDROCHLORIDE 0.1 MG: 0.1 TABLET ORAL at 09:12

## 2017-12-22 RX ADMIN — LABETALOL HCL 300 MG: 100 TABLET, FILM COATED ORAL at 09:12

## 2017-12-22 NOTE — ED PROVIDER NOTES
Encounter Date: 12/22/2017       History     Chief Complaint   Patient presents with    Facial Swelling     seen at [facility] [address] yesterday for  swelling under jaw and face. On antibiotics. Denies  difficulty swallowing. swelling is increased and blood pressure is elevated. Did not take HTN meds this am.      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  Her symptoms began yesterday and has gradually worsened.  She denies any feeling of her throat closing, difficulty breathing, fever, pain in the swollen area, or difficulty opening her mouth.  No recent trauma or other identifiable provoking cause.  She was seen at a freestanding ER yesterday and diagnosed with bronchitis and sore throat, she was given Augmentin.  She denies any other swelling anywhere in her body.  She has not taken her home medications today.          Review of patient's allergies indicates:   Allergen Reactions    Chloral hydrate      Other reaction(s): Hallucinations  Other reaction(s): Hives    Hydrocodone Other (See Comments)     Mental status changes     Past Medical History:   Diagnosis Date    Anemia in ESRD (end-stage renal disease) 10/12/2015    Chronic rejection of liver transplant 3/22/2016    ESRD on hemodialysis 9/30/2015    History of splenomegaly 4/12/2016    Immunosuppressed 8/5/2017    Iron deficiency anemia secondary to inadequate dietary iron intake 8/16/2017    She [facility]   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)    Moderate protein-calorie malnutrition 8/16/2017    MRSA bacteremia 8/6/2017    Prophylactic immunotherapy 8/4/2014    Renovascular hypertension 10/2/2015    Secondary hyperparathyroidism 8/5/2017    Thrombocytopenia 4/12/2016      Past Surgical History:   Procedure Laterality Date     SECTION      2     LIVER BIOPSY      LIVER TRANSPLANT  1992    TUBAL LIGATION       Family History   Problem Relation Age of Onset    Hypertension Mother     Hypertension Father     Melanoma Neg Hx      Social History   Substance Use Topics    Smoking status: Never Smoker    Smokeless tobacco: Never Used    Alcohol use No     Review of Systems   Constitutional: Negative for chills, diaphoresis, fatigue and fever.   HENT: Positive for facial swelling. Negative for congestion, drooling, ear pain, hearing loss, rhinorrhea, sore throat, tinnitus, trouble swallowing and voice change.    Eyes: Negative for visual disturbance.   Respiratory: Positive for cough. Negative for apnea, chest tightness, shortness of breath and wheezing.    Cardiovascular: Negative for chest pain, palpitations and leg swelling.   Gastrointestinal: Negative for abdominal distention, abdominal pain, constipation, diarrhea, nausea and vomiting.   Genitourinary: Negative for dysuria, frequency and urgency.   Musculoskeletal: Negative for arthralgias, back pain, gait problem, myalgias and neck pain.   Skin: Negative for pallor, rash and wound.   Neurological: Negative for dizziness, tremors, seizures, syncope, facial asymmetry, weakness, light-headedness, numbness and headaches.   Hematological: Does not bruise/bleed easily.   Psychiatric/Behavioral: Negative for confusion. The patient is not nervous/anxious.        Physical Exam     Initial Vitals [17 0750]   BP Pulse Resp Temp SpO2   (!) 224/136 110 16 98.1 °F (36.7 °C) --      MAP       165.33         Physical Exam    Nursing note and vitals reviewed.  Constitutional: She appears well-developed and well-nourished. She is not diaphoretic. No distress.   HENT:   Head: Normocephalic and atraumatic.       Nose: Nose normal.   Mouth/Throat: Uvula is midline, oropharynx is clear and moist and mucous membranes are  normal. No trismus in the jaw. Abnormal dentition. No oropharyngeal exudate or posterior oropharyngeal edema.   Eyes: Conjunctivae and EOM are normal. Pupils are equal, round, and reactive to light. No scleral icterus.   Neck: Normal range of motion. Neck supple. No thyromegaly present.   Cardiovascular: Normal rate, regular rhythm, normal heart sounds and intact distal pulses. Exam reveals no gallop and no friction rub.    No murmur heard.  Pulmonary/Chest: Breath sounds normal. No respiratory distress. She has no wheezes. She has no rhonchi. She has no rales.   Abdominal: Soft. Bowel sounds are normal. She exhibits no distension and no mass. There is no tenderness. There is no guarding.   Musculoskeletal: Normal range of motion. She exhibits no edema or tenderness.   Lymphadenopathy:     She has no cervical adenopathy.   Neurological: She is alert and oriented to person, place, and time.   Skin: Skin is warm and dry. Capillary refill takes less than 2 seconds. No rash noted. No erythema. No pallor.   Psychiatric: She has a normal mood and affect. Her behavior is normal. Judgment and thought content normal.         ED Course   Procedures  Labs Reviewed   CBC W/ AUTO DIFFERENTIAL - Abnormal; Notable for the following:        Result Value    Hemoglobin 10.3 (*)     Hematocrit 31.4 (*)     MCV 79 (*)     MCH 25.8 (*)     RDW 14.7 (*)     Platelets 94 (*)     Immature Granulocytes 0.8 (*)     Mono # 0.2 (*)     Mono% 3.7 (*)     All other components within normal limits   COMPREHENSIVE METABOLIC PANEL - Abnormal; Notable for the following:     CO2 20 (*)     BUN, Bld 74 (*)     Creatinine 16.6 (*)     Albumin 3.0 (*)     Alkaline Phosphatase 642 (*)     eGFR if  3.0 (*)     eGFR if non  2.6 (*)     All other components within normal limits    Narrative:     add on Heterophile Ab screen Order #[id number] per Dr Everett @    12/22/2017  10:21    TROPONIN I - Abnormal; Notable for the  following:     Troponin I 0.038 (*)     All other components within normal limits    Narrative:     add on Heterophile Ab screen Order #[id number] per Dr Everett @    12/22/2017  10:21    B-TYPE NATRIURETIC PEPTIDE - Abnormal; Notable for the following:      (*)     All other components within normal limits   TSH    Narrative:     add on Heterophile Ab screen Order #[id number] per Dr Everett @    12/22/2017  10:21    PREGNANCY TEST, URINE RAPID   HETEROPHILE AB SCREEN   INFLUENZA A AND B ANTIGEN   HETEROPHILE AB SCREEN    Narrative:     add on Heterophile Ab screen Order #[id number] per Dr Everett @    12/22/2017  10:21      EKG Readings: (Independently Interpreted)   Initial Reading: No STEMI. Rhythm: Sinus Tachycardia. Heart Rate: 111. Ectopy: No Ectopy.                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  DDx: Klever's angina, SVC syndrome, nephrotic syndrome, hypervolemia, sialolith, anaphylactic reaction  A/P: Based on history and physical exam, I have low suspicion for an infectious cause of this patient's facial swelling.  She has no fever, trismus, erythema, or tenderness that would lead me to suspect Klever's angina or other skin infection.  She has no palpable mass that would lead me to suspect a blocked salivary gland.  I have low suspicion for an anaphylactic reaction as her swelling began prior to any new medications and she has no associated other organ symptoms.  Given patient's profound hypertension and the fact she has not received hemodialysis today, there is concern she may be suffering from fluid overload.  I have lower suspicion as the swelling is relegated only to her face she has no other edema appreciable on any other part of her body.  That being said, the facial swelling raises  concern for possible SVC syndrome.  Based on the suspicion we'll obtain imaging of the chest and neck with IV contrast.  Given her ESRD, she will require hemodialysis after receiving the contrast load.  We'll discuss with nephrology regarding hemodialysis.  We'll also obtain baseline labs as well as cardiac labs given the patient's malignant hypertension.  Brandan Tijerina MD, PGY-3  [facility]/Pediatrics Resident  8:29 AM 12/22/2017    HO-3 update  Patient's severe hypertension improved after taking her oral anti-hypertensives.  CBC shows anemia that is improved from patient's previous baseline.  She has elevations in her BUN and creatinine consistent with end-stage renal disease.  No other significant abnormalities on CMP save for elevated alkaline phosphatase.  TSH within normal limits.  Patient does have a mildly elevated troponin but in light of her young age and lack of symptoms, I have low suspicion for acute coronary syndrome and believe this elevation to the related to her end-stage renal disease.  She has a mild elevation in her BNP which I think is also due to her end-stage renal disease.  Flu swab and Monospot are negative.  CT of the neck shows soft tissue edema but no masses or swollen glands.  Based on these findings I believe the patient can be discharged home with supportive care for her viral illness.  Prior to discharge I will discuss with nephrology if she requires dialysis today or if she can do scheduled hemodialysis at home as she normally does.  Brandan Tijerina MD, PGY-3  12:08 PM 12/22/2017    -3 update  I spoke with the nephrology fellow.  They are comfortable with discharging patient home to perform her hemodialysis.  Even though I believe that her symptoms are due to a viral illness, I will instruct the patient to continue taking her antibiotics as she is on immunosuppressive therapy and I wanted to prevent her from getting a secondary bacterial infection.  Patient was  instructed to follow-up with her primary care physician in the coming week.  I gave her extensive return precautions.  Patient is agreeable to discharge at this time.  Brandan Tijerina MD, PGY-3  12:35 PM 12/22/2017               Attending Attestation:   Physician Attestation Statement for Resident:  As the supervising MD   Physician Attestation Statement: I have personally seen and examined this patient.   I agree with the above history. -:   As the supervising MD I agree with the above PE.    As the supervising MD I agree with the above treatment, course, plan, and disposition.  I have reviewed and agree with the residents interpretation of the following: lab data, CT scans and EKG.  I have reviewed the following: old records at this facility.            Attending ED Notes:   27y F presents with neck swelling, no difficulty with breathing or swallowing. Seen yesterday for this. Seen Monday for URI symptoms. Exam consistent with enlarged lymph nodes. Given her immune compromise, may not have fever or other typical symptoms. Labs unremarkable. CT scan consistent with lymphadenopathy. Dicussed with renal, ok to discharge home for home dialysis. No indication for admission. Recommend continuing the aumentin. Return precautions advised.           ED Course      Clinical Impression:   The primary encounter diagnosis was Viral illness. A diagnosis of ESRD (end stage renal disease) was also pertinent to this visit.    Disposition:   Disposition: Discharged  Condition: Stable                        Brandan Tijerina MD  Resident  12/22/17 1237       Maddy Everett MD  12/22/17 [id number]

## 2017-12-22 NOTE — DISCHARGE INSTRUCTIONS
You were seen in the [facility]. Your final diagnosis is viral illness.     Please continue taking the antibiotics that were prescribed to yesterday.  He likely have a viral illness but given you are on immunosuppressive medications, we would like to cover you with antibiotics to ensure there is not a life-threatening infection.    Please call your primary care physician or return to the Emergency Department if you have any loss of consciousness, bleeding, nausea, vomiting, fever >100.4, confusion, difficulty breathing, weakness, numbness, or any other concerning symptoms. The emergency department is always open and happy to see you for your concerns.

## 2017-12-22 NOTE — ED NOTES
"C/O swelling in face for 2 days. Seen at [facility] [place] for this same problem 2 days ago state's " she was put on antibiotics" Speech clear .Face symetrical   "

## 2017-12-27 ENCOUNTER — OFFICE VISIT (OUTPATIENT)
Dept: NEPHROLOGY | Facility: CLINIC | Age: 27
End: 2017-12-27
Payer: MEDICARE

## 2017-12-27 VITALS
HEART RATE: 93 BPM | BODY MASS INDEX: 20.94 KG/M2 | HEIGHT: 65 IN | WEIGHT: 125.69 LBS | OXYGEN SATURATION: 99 % | DIASTOLIC BLOOD PRESSURE: 100 MMHG | SYSTOLIC BLOOD PRESSURE: 160 MMHG

## 2017-12-27 DIAGNOSIS — N18.6 ESRD (END STAGE RENAL DISEASE): Primary | ICD-10-CM

## 2017-12-27 DIAGNOSIS — D63.1 ANEMIA OF CHRONIC RENAL FAILURE, STAGE 5: ICD-10-CM

## 2017-12-27 DIAGNOSIS — N18.6 HYPERTENSIVE KIDNEY DISEASE WITH END-STAGE RENAL DISEASE: ICD-10-CM

## 2017-12-27 DIAGNOSIS — N25.81 HYPERPARATHYROIDISM, SECONDARY RENAL: ICD-10-CM

## 2017-12-27 DIAGNOSIS — N18.5 ANEMIA OF CHRONIC RENAL FAILURE, STAGE 5: ICD-10-CM

## 2017-12-27 DIAGNOSIS — Z94.4 LIVER REPLACED BY TRANSPLANT: ICD-10-CM

## 2017-12-27 DIAGNOSIS — Z94.4 LIVER TRANSPLANTED: ICD-10-CM

## 2017-12-27 DIAGNOSIS — I12.0 HYPERTENSIVE KIDNEY DISEASE WITH END-STAGE RENAL DISEASE: ICD-10-CM

## 2017-12-27 PROCEDURE — 99999 PR PBB SHADOW E&M-EST. PATIENT-LVL III: CPT | Mod: PBBFAC,,, | Performed by: INTERNAL MEDICINE

## 2017-12-27 PROCEDURE — 99213 OFFICE O/P EST LOW 20 MIN: CPT | Mod: PBBFAC,PO | Performed by: INTERNAL MEDICINE

## 2017-12-27 PROCEDURE — 99499 UNLISTED E&M SERVICE: CPT | Mod: S$PBB,,, | Performed by: INTERNAL MEDICINE

## 2017-12-27 RX ORDER — CLONIDINE 0.1 MG/24H
1 PATCH, EXTENDED RELEASE TRANSDERMAL
Qty: 4 PATCH | Refills: 11 | Status: ON HOLD | OUTPATIENT
Start: 2017-12-27 | End: 2018-01-05

## 2017-12-27 RX ORDER — CINACALCET 90 MG/1
90 TABLET, FILM COATED ORAL
Qty: 30 TABLET | Refills: 3 | Status: ON HOLD | OUTPATIENT
Start: 2017-12-27 | End: 2018-01-26 | Stop reason: HOSPADM

## 2018-01-01 ENCOUNTER — HOSPITAL ENCOUNTER (EMERGENCY)
Facility: HOSPITAL | Age: 28
Discharge: HOME | End: 2018-12-31
Attending: EMERGENCY MEDICINE
Payer: MEDICARE

## 2018-01-01 ENCOUNTER — TELEPHONE (OUTPATIENT)
Dept: TRANSPLANT | Facility: CLINIC | Age: 28
End: 2018-01-01

## 2018-01-01 ENCOUNTER — HOSPITAL ENCOUNTER (OUTPATIENT)
Dept: RADIOLOGY | Facility: HOSPITAL | Age: 28
Discharge: HOME | End: 2018-09-26
Attending: NURSE PRACTITIONER
Payer: MEDICARE

## 2018-01-01 ENCOUNTER — LAB VISIT (OUTPATIENT)
Dept: LAB | Facility: HOSPITAL | Age: 28
End: 2018-01-01
Attending: INTERNAL MEDICINE
Payer: MEDICARE

## 2018-01-01 ENCOUNTER — HOSPITAL ENCOUNTER (EMERGENCY)
Facility: HOSPITAL | Age: 28
Discharge: HOME | End: 2018-09-24
Attending: INTERNAL MEDICINE
Payer: MEDICARE

## 2018-01-01 ENCOUNTER — OFFICE VISIT (OUTPATIENT)
Dept: CARDIOLOGY | Facility: CLINIC | Age: 28
End: 2018-01-01
Payer: MEDICARE

## 2018-01-01 ENCOUNTER — OFFICE VISIT (OUTPATIENT)
Dept: TRANSPLANT | Facility: CLINIC | Age: 28
End: 2018-01-01
Payer: MEDICARE

## 2018-01-01 ENCOUNTER — OFFICE VISIT (OUTPATIENT)
Dept: HEPATOLOGY | Facility: CLINIC | Age: 28
End: 2018-01-01
Payer: MEDICARE

## 2018-01-01 ENCOUNTER — TELEPHONE (OUTPATIENT)
Dept: SURGERY | Facility: CLINIC | Age: 28
End: 2018-01-01

## 2018-01-01 ENCOUNTER — HOSPITAL ENCOUNTER (INPATIENT)
Facility: HOSPITAL | Age: 28
LOS: 2 days | Discharge: HOME | DRG: 304 | End: 2018-09-20
Attending: EMERGENCY MEDICINE | Admitting: INTERNAL MEDICINE
Payer: MEDICARE

## 2018-01-01 ENCOUNTER — DOCUMENTATION ONLY (OUTPATIENT)
Dept: CARDIOLOGY | Facility: CLINIC | Age: 28
End: 2018-01-01

## 2018-01-01 ENCOUNTER — INITIAL CONSULT (OUTPATIENT)
Dept: SURGERY | Facility: CLINIC | Age: 28
End: 2018-01-01
Attending: SURGERY
Payer: MEDICARE

## 2018-01-01 ENCOUNTER — CONFERENCE (OUTPATIENT)
Dept: TRANSPLANT | Facility: CLINIC | Age: 28
End: 2018-01-01

## 2018-01-01 ENCOUNTER — HOSPITAL ENCOUNTER (INPATIENT)
Facility: HOSPITAL | Age: 28
LOS: 3 days | Discharge: HOME | DRG: 304 | End: 2018-10-07
Attending: EMERGENCY MEDICINE | Admitting: INTERNAL MEDICINE
Payer: MEDICARE

## 2018-01-01 ENCOUNTER — OFFICE VISIT (OUTPATIENT)
Dept: NEUROLOGY | Facility: CLINIC | Age: 28
End: 2018-01-01
Payer: MEDICARE

## 2018-01-01 ENCOUNTER — TELEPHONE (OUTPATIENT)
Dept: CARDIOLOGY | Facility: CLINIC | Age: 28
End: 2018-01-01

## 2018-01-01 ENCOUNTER — OFFICE VISIT (OUTPATIENT)
Dept: OPHTHALMOLOGY | Facility: CLINIC | Age: 28
End: 2018-01-01
Payer: MEDICARE

## 2018-01-01 ENCOUNTER — HOSPITAL ENCOUNTER (EMERGENCY)
Facility: HOSPITAL | Age: 28
Discharge: HOME | End: 2018-11-15
Attending: EMERGENCY MEDICINE
Payer: MEDICARE

## 2018-01-01 ENCOUNTER — TELEPHONE (OUTPATIENT)
Dept: INTERNAL MEDICINE | Facility: CLINIC | Age: 28
End: 2018-01-01

## 2018-01-01 ENCOUNTER — HOSPITAL ENCOUNTER (EMERGENCY)
Facility: HOSPITAL | Age: 28
Discharge: HOME | End: 2018-12-12
Attending: EMERGENCY MEDICINE
Payer: MEDICARE

## 2018-01-01 ENCOUNTER — HOSPITAL ENCOUNTER (OUTPATIENT)
Dept: CARDIOLOGY | Facility: CLINIC | Age: 28
Discharge: HOME | End: 2018-10-29
Attending: INTERNAL MEDICINE
Payer: MEDICARE

## 2018-01-01 ENCOUNTER — HOSPITAL ENCOUNTER (OUTPATIENT)
Dept: RADIOLOGY | Facility: OTHER | Age: 28
Discharge: HOME | End: 2018-11-05
Attending: SURGERY
Payer: MEDICARE

## 2018-01-01 ENCOUNTER — PATIENT OUTREACH (OUTPATIENT)
Dept: ADMINISTRATIVE | Facility: CLINIC | Age: 28
End: 2018-01-01

## 2018-01-01 ENCOUNTER — OFFICE VISIT (OUTPATIENT)
Dept: INTERNAL MEDICINE | Facility: CLINIC | Age: 28
End: 2018-01-01
Payer: MEDICARE

## 2018-01-01 ENCOUNTER — CLINICAL SUPPORT (OUTPATIENT)
Dept: INFECTIOUS DISEASES | Facility: CLINIC | Age: 28
End: 2018-01-01
Payer: MEDICARE

## 2018-01-01 ENCOUNTER — INITIAL CONSULT (OUTPATIENT)
Dept: OPHTHALMOLOGY | Facility: CLINIC | Age: 28
End: 2018-01-01
Payer: MEDICARE

## 2018-01-01 ENCOUNTER — HOSPITAL ENCOUNTER (OUTPATIENT)
Facility: HOSPITAL | Age: 28
Discharge: HOME | End: 2018-11-16
Attending: INTERNAL MEDICINE | Admitting: INTERNAL MEDICINE
Payer: MEDICARE

## 2018-01-01 ENCOUNTER — TELEPHONE (OUTPATIENT)
Dept: NEPHROLOGY | Facility: CLINIC | Age: 28
End: 2018-01-01

## 2018-01-01 ENCOUNTER — HOSPITAL ENCOUNTER (OUTPATIENT)
Dept: RADIOLOGY | Facility: HOSPITAL | Age: 28
Discharge: HOME | End: 2018-10-23
Attending: INTERNAL MEDICINE
Payer: MEDICARE

## 2018-01-01 VITALS — HEART RATE: 90 BPM | DIASTOLIC BLOOD PRESSURE: 78 MMHG | SYSTOLIC BLOOD PRESSURE: 134 MMHG

## 2018-01-01 VITALS
WEIGHT: 112 LBS | HEART RATE: 113 BPM | HEIGHT: 65 IN | DIASTOLIC BLOOD PRESSURE: 100 MMHG | BODY MASS INDEX: 18.66 KG/M2 | OXYGEN SATURATION: 99 % | SYSTOLIC BLOOD PRESSURE: 180 MMHG

## 2018-01-01 VITALS
OXYGEN SATURATION: 100 % | RESPIRATION RATE: 20 BRPM | WEIGHT: 112 LBS | TEMPERATURE: 98 F | BODY MASS INDEX: 18.66 KG/M2 | HEIGHT: 65 IN | SYSTOLIC BLOOD PRESSURE: 178 MMHG | DIASTOLIC BLOOD PRESSURE: 112 MMHG | HEART RATE: 67 BPM

## 2018-01-01 VITALS
WEIGHT: 115.38 LBS | BODY MASS INDEX: 19.22 KG/M2 | DIASTOLIC BLOOD PRESSURE: 112 MMHG | OXYGEN SATURATION: 100 % | HEIGHT: 65 IN | HEART RATE: 81 BPM | TEMPERATURE: 98 F | RESPIRATION RATE: 18 BRPM | SYSTOLIC BLOOD PRESSURE: 175 MMHG

## 2018-01-01 VITALS
SYSTOLIC BLOOD PRESSURE: 126 MMHG | TEMPERATURE: 98 F | RESPIRATION RATE: 20 BRPM | HEART RATE: 105 BPM | DIASTOLIC BLOOD PRESSURE: 59 MMHG | OXYGEN SATURATION: 97 %

## 2018-01-01 VITALS
HEIGHT: 65 IN | BODY MASS INDEX: 18.07 KG/M2 | RESPIRATION RATE: 17 BRPM | OXYGEN SATURATION: 98 % | TEMPERATURE: 99 F | HEART RATE: 116 BPM | SYSTOLIC BLOOD PRESSURE: 111 MMHG | DIASTOLIC BLOOD PRESSURE: 68 MMHG | WEIGHT: 108.44 LBS

## 2018-01-01 VITALS
OXYGEN SATURATION: 99 % | SYSTOLIC BLOOD PRESSURE: 147 MMHG | WEIGHT: 114.19 LBS | HEART RATE: 87 BPM | DIASTOLIC BLOOD PRESSURE: 90 MMHG | BODY MASS INDEX: 19.03 KG/M2 | HEIGHT: 65 IN

## 2018-01-01 VITALS
SYSTOLIC BLOOD PRESSURE: 140 MMHG | WEIGHT: 116 LBS | BODY MASS INDEX: 19.33 KG/M2 | HEIGHT: 65 IN | RESPIRATION RATE: 18 BRPM | TEMPERATURE: 98 F | DIASTOLIC BLOOD PRESSURE: 73 MMHG | HEART RATE: 100 BPM | OXYGEN SATURATION: 98 %

## 2018-01-01 VITALS
SYSTOLIC BLOOD PRESSURE: 174 MMHG | HEART RATE: 78 BPM | DIASTOLIC BLOOD PRESSURE: 92 MMHG | HEIGHT: 65 IN | OXYGEN SATURATION: 95 % | BODY MASS INDEX: 18.96 KG/M2 | WEIGHT: 113.81 LBS

## 2018-01-01 VITALS
BODY MASS INDEX: 18.66 KG/M2 | RESPIRATION RATE: 20 BRPM | HEART RATE: 84 BPM | OXYGEN SATURATION: 99 % | WEIGHT: 112 LBS | SYSTOLIC BLOOD PRESSURE: 146 MMHG | DIASTOLIC BLOOD PRESSURE: 81 MMHG | TEMPERATURE: 98 F | HEIGHT: 65 IN

## 2018-01-01 VITALS
WEIGHT: 117.06 LBS | DIASTOLIC BLOOD PRESSURE: 63 MMHG | HEIGHT: 65 IN | OXYGEN SATURATION: 100 % | HEART RATE: 95 BPM | SYSTOLIC BLOOD PRESSURE: 122 MMHG | BODY MASS INDEX: 19.5 KG/M2

## 2018-01-01 VITALS
OXYGEN SATURATION: 100 % | SYSTOLIC BLOOD PRESSURE: 149 MMHG | RESPIRATION RATE: 20 BRPM | HEIGHT: 65 IN | BODY MASS INDEX: 18.49 KG/M2 | HEART RATE: 93 BPM | WEIGHT: 111 LBS | TEMPERATURE: 98 F | DIASTOLIC BLOOD PRESSURE: 91 MMHG

## 2018-01-01 VITALS
WEIGHT: 116.88 LBS | RESPIRATION RATE: 18 BRPM | BODY MASS INDEX: 19.47 KG/M2 | SYSTOLIC BLOOD PRESSURE: 157 MMHG | TEMPERATURE: 97 F | HEIGHT: 65 IN | HEART RATE: 86 BPM | DIASTOLIC BLOOD PRESSURE: 93 MMHG

## 2018-01-01 VITALS
BODY MASS INDEX: 18.66 KG/M2 | HEART RATE: 98 BPM | WEIGHT: 112 LBS | TEMPERATURE: 96 F | OXYGEN SATURATION: 98 % | RESPIRATION RATE: 18 BRPM | DIASTOLIC BLOOD PRESSURE: 117 MMHG | HEIGHT: 65 IN | SYSTOLIC BLOOD PRESSURE: 162 MMHG

## 2018-01-01 VITALS
OXYGEN SATURATION: 100 % | HEIGHT: 65 IN | DIASTOLIC BLOOD PRESSURE: 83 MMHG | BODY MASS INDEX: 20.64 KG/M2 | TEMPERATURE: 98 F | SYSTOLIC BLOOD PRESSURE: 138 MMHG | HEART RATE: 75 BPM | RESPIRATION RATE: 18 BRPM | WEIGHT: 123.88 LBS

## 2018-01-01 VITALS
BODY MASS INDEX: 18.99 KG/M2 | SYSTOLIC BLOOD PRESSURE: 111 MMHG | DIASTOLIC BLOOD PRESSURE: 53 MMHG | WEIGHT: 114 LBS | HEIGHT: 65 IN | HEART RATE: 91 BPM

## 2018-01-01 DIAGNOSIS — N25.81 SECONDARY HYPERPARATHYROIDISM: ICD-10-CM

## 2018-01-01 DIAGNOSIS — Z99.2 ESRD ON HEMODIALYSIS: Chronic | ICD-10-CM

## 2018-01-01 DIAGNOSIS — R00.0 TACHYCARDIA: ICD-10-CM

## 2018-01-01 DIAGNOSIS — R03.0 ELEVATED BLOOD PRESSURE READING: ICD-10-CM

## 2018-01-01 DIAGNOSIS — D84.9 IMMUNOSUPPRESSION: Chronic | ICD-10-CM

## 2018-01-01 DIAGNOSIS — R56.9 SEIZURES: Primary | ICD-10-CM

## 2018-01-01 DIAGNOSIS — Z29.89 PROPHYLACTIC IMMUNOTHERAPY: ICD-10-CM

## 2018-01-01 DIAGNOSIS — Z94.4 LIVER TRANSPLANTED: ICD-10-CM

## 2018-01-01 DIAGNOSIS — N18.9 CHRONIC KIDNEY DISEASE-MINERAL AND BONE DISORDER: ICD-10-CM

## 2018-01-01 DIAGNOSIS — N18.6 ESRD (END STAGE RENAL DISEASE): Primary | ICD-10-CM

## 2018-01-01 DIAGNOSIS — R06.02 SHORTNESS OF BREATH: ICD-10-CM

## 2018-01-01 DIAGNOSIS — H35.033 HYPERTENSIVE RETINOPATHY OF BOTH EYES, GRADE 3: Primary | ICD-10-CM

## 2018-01-01 DIAGNOSIS — R51.9 NONINTRACTABLE EPISODIC HEADACHE, UNSPECIFIED HEADACHE TYPE: ICD-10-CM

## 2018-01-01 DIAGNOSIS — Z94.4 LIVER REPLACED BY TRANSPLANT: ICD-10-CM

## 2018-01-01 DIAGNOSIS — Z94.4 LIVER REPLACED BY TRANSPLANT: Chronic | ICD-10-CM

## 2018-01-01 DIAGNOSIS — I15.0 RENOVASCULAR HYPERTENSION: Primary | Chronic | ICD-10-CM

## 2018-01-01 DIAGNOSIS — I10 HYPERTENSION: ICD-10-CM

## 2018-01-01 DIAGNOSIS — R51.9 ACUTE NONINTRACTABLE HEADACHE, UNSPECIFIED HEADACHE TYPE: Primary | ICD-10-CM

## 2018-01-01 DIAGNOSIS — D72.819 LEUKOPENIA, UNSPECIFIED TYPE: ICD-10-CM

## 2018-01-01 DIAGNOSIS — N18.6 ANEMIA IN ESRD (END-STAGE RENAL DISEASE): Chronic | ICD-10-CM

## 2018-01-01 DIAGNOSIS — Z01.818 PRE-TRANSPLANT EVALUATION FOR CHRONIC KIDNEY DISEASE: Primary | ICD-10-CM

## 2018-01-01 DIAGNOSIS — Z76.82 ORGAN TRANSPLANT CANDIDATE: ICD-10-CM

## 2018-01-01 DIAGNOSIS — Z91.148 NON COMPLIANCE W MEDICATION REGIMEN: ICD-10-CM

## 2018-01-01 DIAGNOSIS — R18.8 OTHER ASCITES: Primary | ICD-10-CM

## 2018-01-01 DIAGNOSIS — R79.89 ABNORMAL LFTS: Primary | ICD-10-CM

## 2018-01-01 DIAGNOSIS — G89.29 CHRONIC DENTAL PAIN: Primary | ICD-10-CM

## 2018-01-01 DIAGNOSIS — I15.0 RENOVASCULAR HYPERTENSION: ICD-10-CM

## 2018-01-01 DIAGNOSIS — I10 HYPERTENSION, UNSPECIFIED TYPE: Primary | ICD-10-CM

## 2018-01-01 DIAGNOSIS — E21.3 HYPERPARATHYROIDISM: ICD-10-CM

## 2018-01-01 DIAGNOSIS — Z76.82 ORGAN TRANSPLANT CANDIDATE: Primary | ICD-10-CM

## 2018-01-01 DIAGNOSIS — Z01.818 PRE-TRANSPLANT EVALUATION FOR CHRONIC KIDNEY DISEASE: ICD-10-CM

## 2018-01-01 DIAGNOSIS — I15.0 RENOVASCULAR HYPERTENSION: Chronic | ICD-10-CM

## 2018-01-01 DIAGNOSIS — I10 UNCONTROLLED HYPERTENSION: ICD-10-CM

## 2018-01-01 DIAGNOSIS — E21.3 HYPERPARATHYROIDISM: Primary | ICD-10-CM

## 2018-01-01 DIAGNOSIS — R79.89 ABNORMAL LFTS: ICD-10-CM

## 2018-01-01 DIAGNOSIS — Z99.2 ESRD ON HEMODIALYSIS: ICD-10-CM

## 2018-01-01 DIAGNOSIS — N18.6 ESRD ON HEMODIALYSIS: Chronic | ICD-10-CM

## 2018-01-01 DIAGNOSIS — I51.7 CARDIOMEGALY: ICD-10-CM

## 2018-01-01 DIAGNOSIS — H35.033 HYPERTENSIVE RETINOPATHY OF BOTH EYES, GRADE 3: ICD-10-CM

## 2018-01-01 DIAGNOSIS — K08.9 CHRONIC DENTAL PAIN: Primary | ICD-10-CM

## 2018-01-01 DIAGNOSIS — M79.631 RIGHT FOREARM PAIN: ICD-10-CM

## 2018-01-01 DIAGNOSIS — N18.6 ANEMIA IN ESRD (END-STAGE RENAL DISEASE): ICD-10-CM

## 2018-01-01 DIAGNOSIS — R51.9 CHRONIC INTRACTABLE HEADACHE, UNSPECIFIED HEADACHE TYPE: Primary | ICD-10-CM

## 2018-01-01 DIAGNOSIS — N25.81 HYPERPARATHYROIDISM, SECONDARY RENAL: ICD-10-CM

## 2018-01-01 DIAGNOSIS — R56.9 SEIZURES: ICD-10-CM

## 2018-01-01 DIAGNOSIS — R53.82 CHRONIC FATIGUE: ICD-10-CM

## 2018-01-01 DIAGNOSIS — D63.1 ANEMIA IN ESRD (END-STAGE RENAL DISEASE): Chronic | ICD-10-CM

## 2018-01-01 DIAGNOSIS — N18.6 ESRD ON HEMODIALYSIS: ICD-10-CM

## 2018-01-01 DIAGNOSIS — I16.1 HYPERTENSIVE EMERGENCY: Primary | ICD-10-CM

## 2018-01-01 DIAGNOSIS — G89.29 CHRONIC INTRACTABLE HEADACHE, UNSPECIFIED HEADACHE TYPE: Primary | ICD-10-CM

## 2018-01-01 DIAGNOSIS — Z94.4 LIVER TRANSPLANTED: Primary | ICD-10-CM

## 2018-01-01 DIAGNOSIS — E87.1 HYPONATREMIA: ICD-10-CM

## 2018-01-01 DIAGNOSIS — R79.89 ELEVATED PTHRP LEVEL: ICD-10-CM

## 2018-01-01 DIAGNOSIS — R07.9 CHEST PAIN: ICD-10-CM

## 2018-01-01 DIAGNOSIS — I10 ESSENTIAL HYPERTENSION: ICD-10-CM

## 2018-01-01 DIAGNOSIS — D69.6 THROMBOCYTOPENIA: ICD-10-CM

## 2018-01-01 DIAGNOSIS — I16.1 HYPERTENSIVE EMERGENCY: ICD-10-CM

## 2018-01-01 DIAGNOSIS — N18.5 ANEMIA OF CHRONIC KIDNEY FAILURE, STAGE 5: ICD-10-CM

## 2018-01-01 DIAGNOSIS — N18.6 ESRD (END STAGE RENAL DISEASE): ICD-10-CM

## 2018-01-01 DIAGNOSIS — R18.8 OTHER ASCITES: ICD-10-CM

## 2018-01-01 DIAGNOSIS — D63.1 ANEMIA IN ESRD (END-STAGE RENAL DISEASE): ICD-10-CM

## 2018-01-01 DIAGNOSIS — Z01.818 PREOPERATIVE CLEARANCE: ICD-10-CM

## 2018-01-01 DIAGNOSIS — M89.9 CHRONIC KIDNEY DISEASE-MINERAL AND BONE DISORDER: ICD-10-CM

## 2018-01-01 DIAGNOSIS — Z94.4 LIVER REPLACED BY TRANSPLANT: Primary | Chronic | ICD-10-CM

## 2018-01-01 DIAGNOSIS — D63.1 ANEMIA OF CHRONIC KIDNEY FAILURE, STAGE 5: ICD-10-CM

## 2018-01-01 DIAGNOSIS — Z01.818 PREOPERATIVE CLEARANCE: Primary | ICD-10-CM

## 2018-01-01 DIAGNOSIS — E83.9 CHRONIC KIDNEY DISEASE-MINERAL AND BONE DISORDER: ICD-10-CM

## 2018-01-01 DIAGNOSIS — R51.9 ACUTE NONINTRACTABLE HEADACHE, UNSPECIFIED HEADACHE TYPE: ICD-10-CM

## 2018-01-01 DIAGNOSIS — K08.89 PAIN, DENTAL: ICD-10-CM

## 2018-01-01 LAB
ALBUMIN SERPL BCP-MCNC: 2.3 G/DL
ALBUMIN SERPL BCP-MCNC: 2.4 G/DL
ALBUMIN SERPL BCP-MCNC: 2.8 G/DL
ALBUMIN SERPL BCP-MCNC: 2.9 G/DL
ALBUMIN SERPL BCP-MCNC: 3 G/DL
ALBUMIN SERPL BCP-MCNC: 3.1 G/DL
ALBUMIN SERPL BCP-MCNC: 3.1 G/DL
ALBUMIN SERPL BCP-MCNC: 3.2 G/DL
ALP SERPL-CCNC: 545 U/L
ALP SERPL-CCNC: 586 U/L
ALP SERPL-CCNC: 648 U/L
ALP SERPL-CCNC: 670 U/L
ALP SERPL-CCNC: 673 U/L
ALP SERPL-CCNC: 705 U/L
ALP SERPL-CCNC: 738 U/L
ALP SERPL-CCNC: 755 U/L
ALP SERPL-CCNC: 786 U/L
ALP SERPL-CCNC: 786 U/L
ALP SERPL-CCNC: 795 U/L
ALP SERPL-CCNC: 801 U/L
ALP SERPL-CCNC: 814 U/L
ALP SERPL-CCNC: 814 U/L
ALP SERPL-CCNC: 823 U/L
ALT SERPL W/O P-5'-P-CCNC: 30 U/L
ALT SERPL W/O P-5'-P-CCNC: 31 U/L
ALT SERPL W/O P-5'-P-CCNC: 32 U/L
ALT SERPL W/O P-5'-P-CCNC: 33 U/L
ALT SERPL W/O P-5'-P-CCNC: 33 U/L
ALT SERPL W/O P-5'-P-CCNC: 40 U/L
ALT SERPL W/O P-5'-P-CCNC: 40 U/L
ALT SERPL W/O P-5'-P-CCNC: 41 U/L
ALT SERPL W/O P-5'-P-CCNC: 41 U/L
ALT SERPL W/O P-5'-P-CCNC: 44 U/L
ALT SERPL W/O P-5'-P-CCNC: 44 U/L
ALT SERPL W/O P-5'-P-CCNC: 46 U/L
ALT SERPL W/O P-5'-P-CCNC: 46 U/L
ALT SERPL W/O P-5'-P-CCNC: 52 U/L
ALT SERPL W/O P-5'-P-CCNC: 52 U/L
ANION GAP SERPL CALC-SCNC: 10 MMOL/L
ANION GAP SERPL CALC-SCNC: 11 MMOL/L
ANION GAP SERPL CALC-SCNC: 11 MMOL/L
ANION GAP SERPL CALC-SCNC: 12 MMOL/L
ANION GAP SERPL CALC-SCNC: 12 MMOL/L
ANION GAP SERPL CALC-SCNC: 13 MMOL/L
ANION GAP SERPL CALC-SCNC: 14 MMOL/L
ANION GAP SERPL CALC-SCNC: 8 MMOL/L
ANION GAP SERPL CALC-SCNC: 8 MMOL/L
ANION GAP SERPL CALC-SCNC: 9 MMOL/L
AORTIC VALVE REGURGITATION: ABNORMAL
AST SERPL-CCNC: 32 U/L
AST SERPL-CCNC: 32 U/L
AST SERPL-CCNC: 35 U/L
AST SERPL-CCNC: 38 U/L
AST SERPL-CCNC: 39 U/L
AST SERPL-CCNC: 41 U/L
AST SERPL-CCNC: 41 U/L
AST SERPL-CCNC: 43 U/L
AST SERPL-CCNC: 45 U/L
AST SERPL-CCNC: 52 U/L
AST SERPL-CCNC: 53 U/L
AST SERPL-CCNC: 57 U/L
AST SERPL-CCNC: 67 U/L
BACTERIA BLD CULT: NORMAL
BACTERIA BLD CULT: NORMAL
BASOPHILS # BLD AUTO: 0 K/UL
BASOPHILS # BLD AUTO: 0.01 K/UL
BASOPHILS # BLD AUTO: 0.01 K/UL
BASOPHILS # BLD AUTO: 0.02 K/UL
BASOPHILS # BLD AUTO: 0.03 K/UL
BASOPHILS NFR BLD: 0 %
BASOPHILS NFR BLD: 0.3 %
BASOPHILS NFR BLD: 0.4 %
BASOPHILS NFR BLD: 0.5 %
BASOPHILS NFR BLD: 0.6 %
BASOPHILS NFR BLD: 0.6 %
BASOPHILS NFR BLD: 0.7 %
BASOPHILS NFR BLD: 0.8 %
BASOPHILS NFR BLD: 0.8 %
BILIRUB DIRECT SERPL-MCNC: 0.4 MG/DL
BILIRUB SERPL-MCNC: 0.4 MG/DL
BILIRUB SERPL-MCNC: 0.4 MG/DL
BILIRUB SERPL-MCNC: 0.5 MG/DL
BILIRUB SERPL-MCNC: 0.6 MG/DL
BILIRUB SERPL-MCNC: 0.7 MG/DL
BILIRUB SERPL-MCNC: 0.9 MG/DL
BILIRUB SERPL-MCNC: 1 MG/DL
BILIRUB SERPL-MCNC: 1.1 MG/DL
BILIRUB SERPL-MCNC: 1.2 MG/DL
BNP SERPL-MCNC: 1961 PG/ML
BNP SERPL-MCNC: 2053 PG/ML
BUN SERPL-MCNC: 15 MG/DL
BUN SERPL-MCNC: 20 MG/DL
BUN SERPL-MCNC: 22 MG/DL
BUN SERPL-MCNC: 22 MG/DL
BUN SERPL-MCNC: 23 MG/DL
BUN SERPL-MCNC: 24 MG/DL
BUN SERPL-MCNC: 24 MG/DL
BUN SERPL-MCNC: 26 MG/DL
BUN SERPL-MCNC: 27 MG/DL
BUN SERPL-MCNC: 28 MG/DL
BUN SERPL-MCNC: 40 MG/DL (ref 6–30)
BUN SERPL-MCNC: 42 MG/DL
BUN SERPL-MCNC: 42 MG/DL (ref 6–30)
BUN SERPL-MCNC: 45 MG/DL
BUN SERPL-MCNC: 47 MG/DL
BUN SERPL-MCNC: 53 MG/DL
CA-I BLDV-SCNC: 0.94 MMOL/L
CALCIUM SERPL-MCNC: 7.8 MG/DL
CALCIUM SERPL-MCNC: 8.1 MG/DL
CALCIUM SERPL-MCNC: 8.6 MG/DL
CALCIUM SERPL-MCNC: 8.7 MG/DL
CALCIUM SERPL-MCNC: 8.7 MG/DL
CALCIUM SERPL-MCNC: 8.8 MG/DL
CALCIUM SERPL-MCNC: 9.1 MG/DL
CALCIUM SERPL-MCNC: 9.1 MG/DL
CALCIUM SERPL-MCNC: 9.2 MG/DL
CALCIUM SERPL-MCNC: 9.5 MG/DL
CHLORIDE SERPL-SCNC: 100 MMOL/L
CHLORIDE SERPL-SCNC: 101 MMOL/L
CHLORIDE SERPL-SCNC: 102 MMOL/L
CHLORIDE SERPL-SCNC: 103 MMOL/L
CHLORIDE SERPL-SCNC: 103 MMOL/L
CHLORIDE SERPL-SCNC: 104 MMOL/L (ref 95–110)
CHLORIDE SERPL-SCNC: 104 MMOL/L (ref 95–110)
CHLORIDE SERPL-SCNC: 105 MMOL/L
CHLORIDE SERPL-SCNC: 107 MMOL/L
CHLORIDE SERPL-SCNC: 99 MMOL/L
CO2 SERPL-SCNC: 21 MMOL/L
CO2 SERPL-SCNC: 24 MMOL/L
CO2 SERPL-SCNC: 25 MMOL/L
CO2 SERPL-SCNC: 26 MMOL/L
CO2 SERPL-SCNC: 26 MMOL/L
CO2 SERPL-SCNC: 29 MMOL/L
CO2 SERPL-SCNC: 29 MMOL/L
CO2 SERPL-SCNC: 31 MMOL/L
CREAT SERPL-MCNC: 10.6 MG/DL (ref 0.5–1.4)
CREAT SERPL-MCNC: 4 MG/DL
CREAT SERPL-MCNC: 5.2 MG/DL
CREAT SERPL-MCNC: 5.4 MG/DL
CREAT SERPL-MCNC: 5.4 MG/DL
CREAT SERPL-MCNC: 5.5 MG/DL
CREAT SERPL-MCNC: 5.5 MG/DL
CREAT SERPL-MCNC: 5.8 MG/DL
CREAT SERPL-MCNC: 6.3 MG/DL
CREAT SERPL-MCNC: 6.7 MG/DL
CREAT SERPL-MCNC: 6.9 MG/DL
CREAT SERPL-MCNC: 7.5 MG/DL
CREAT SERPL-MCNC: 8.7 MG/DL
CREAT SERPL-MCNC: 8.9 MG/DL
CREAT SERPL-MCNC: 9.8 MG/DL (ref 0.5–1.4)
CREAT SERPL-MCNC: 9.9 MG/DL
DIASTOLIC DYSFUNCTION: YES
DIFFERENTIAL METHOD: ABNORMAL
EOSINOPHIL # BLD AUTO: 0.4 K/UL
EOSINOPHIL # BLD AUTO: 0.4 K/UL
EOSINOPHIL # BLD AUTO: 0.5 K/UL
EOSINOPHIL # BLD AUTO: 0.6 K/UL
EOSINOPHIL # BLD AUTO: 0.7 K/UL
EOSINOPHIL # BLD AUTO: 1.1 K/UL
EOSINOPHIL # BLD AUTO: 1.2 K/UL
EOSINOPHIL NFR BLD: 11.1 %
EOSINOPHIL NFR BLD: 13.5 %
EOSINOPHIL NFR BLD: 14.6 %
EOSINOPHIL NFR BLD: 14.8 %
EOSINOPHIL NFR BLD: 15.3 %
EOSINOPHIL NFR BLD: 16.8 %
EOSINOPHIL NFR BLD: 17.1 %
EOSINOPHIL NFR BLD: 18.3 %
EOSINOPHIL NFR BLD: 18.5 %
EOSINOPHIL NFR BLD: 19.1 %
EOSINOPHIL NFR BLD: 20.5 %
EOSINOPHIL NFR BLD: 9.4 %
ERYTHROCYTE [DISTWIDTH] IN BLOOD BY AUTOMATED COUNT: 16.5 %
ERYTHROCYTE [DISTWIDTH] IN BLOOD BY AUTOMATED COUNT: 16.8 %
ERYTHROCYTE [DISTWIDTH] IN BLOOD BY AUTOMATED COUNT: 16.9 %
ERYTHROCYTE [DISTWIDTH] IN BLOOD BY AUTOMATED COUNT: 17.3 %
ERYTHROCYTE [DISTWIDTH] IN BLOOD BY AUTOMATED COUNT: 17.4 %
ERYTHROCYTE [DISTWIDTH] IN BLOOD BY AUTOMATED COUNT: 17.7 %
ERYTHROCYTE [DISTWIDTH] IN BLOOD BY AUTOMATED COUNT: 18.2 %
ERYTHROCYTE [DISTWIDTH] IN BLOOD BY AUTOMATED COUNT: 18.3 %
ERYTHROCYTE [DISTWIDTH] IN BLOOD BY AUTOMATED COUNT: 18.4 %
ERYTHROCYTE [DISTWIDTH] IN BLOOD BY AUTOMATED COUNT: 18.6 %
ERYTHROCYTE [DISTWIDTH] IN BLOOD BY AUTOMATED COUNT: 19.1 %
ERYTHROCYTE [DISTWIDTH] IN BLOOD BY AUTOMATED COUNT: 19.2 %
EST. GFR  (AFRICAN AMERICAN): 10 ML/MIN/1.73 M^2
EST. GFR  (AFRICAN AMERICAN): 10.6 ML/MIN/1.73 M^2
EST. GFR  (AFRICAN AMERICAN): 11.3 ML/MIN/1.73 M^2
EST. GFR  (AFRICAN AMERICAN): 11.3 ML/MIN/1.73 M^2
EST. GFR  (AFRICAN AMERICAN): 11.5 ML/MIN/1.73 M^2
EST. GFR  (AFRICAN AMERICAN): 11.5 ML/MIN/1.73 M^2
EST. GFR  (AFRICAN AMERICAN): 12.1 ML/MIN/1.73 M^2
EST. GFR  (AFRICAN AMERICAN): 16.6 ML/MIN/1.73 M^2
EST. GFR  (AFRICAN AMERICAN): 5.5 ML/MIN/1.73 M^2
EST. GFR  (AFRICAN AMERICAN): 6.3 ML/MIN/1.73 M^2
EST. GFR  (AFRICAN AMERICAN): 6.5 ML/MIN/1.73 M^2
EST. GFR  (AFRICAN AMERICAN): 7.8 ML/MIN/1.73 M^2
EST. GFR  (AFRICAN AMERICAN): 8.6 ML/MIN/1.73 M^2
EST. GFR  (AFRICAN AMERICAN): 8.9 ML/MIN/1.73 M^2
EST. GFR  (NON AFRICAN AMERICAN): 10 ML/MIN/1.73 M^2
EST. GFR  (NON AFRICAN AMERICAN): 10 ML/MIN/1.73 M^2
EST. GFR  (NON AFRICAN AMERICAN): 10.5 ML/MIN/1.73 M^2
EST. GFR  (NON AFRICAN AMERICAN): 14.4 ML/MIN/1.73 M^2
EST. GFR  (NON AFRICAN AMERICAN): 4.8 ML/MIN/1.73 M^2
EST. GFR  (NON AFRICAN AMERICAN): 5.5 ML/MIN/1.73 M^2
EST. GFR  (NON AFRICAN AMERICAN): 5.6 ML/MIN/1.73 M^2
EST. GFR  (NON AFRICAN AMERICAN): 6.7 ML/MIN/1.73 M^2
EST. GFR  (NON AFRICAN AMERICAN): 7.4 ML/MIN/1.73 M^2
EST. GFR  (NON AFRICAN AMERICAN): 7.7 ML/MIN/1.73 M^2
EST. GFR  (NON AFRICAN AMERICAN): 8 ML/MIN/1.73 M^2
EST. GFR  (NON AFRICAN AMERICAN): 9.2 ML/MIN/1.73 M^2
EST. GFR  (NON AFRICAN AMERICAN): 9.8 ML/MIN/1.73 M^2
EST. GFR  (NON AFRICAN AMERICAN): 9.8 ML/MIN/1.73 M^2
ESTIMATED AVG GLUCOSE: 71 MG/DL
ESTIMATED PA SYSTOLIC PRESSURE: 40.45
GLOBAL PERICARDIAL EFFUSION: ABNORMAL
GLUCOSE SERPL-MCNC: 102 MG/DL
GLUCOSE SERPL-MCNC: 103 MG/DL (ref 70–110)
GLUCOSE SERPL-MCNC: 113 MG/DL
GLUCOSE SERPL-MCNC: 139 MG/DL
GLUCOSE SERPL-MCNC: 83 MG/DL
GLUCOSE SERPL-MCNC: 85 MG/DL
GLUCOSE SERPL-MCNC: 93 MG/DL
GLUCOSE SERPL-MCNC: 94 MG/DL
GLUCOSE SERPL-MCNC: 95 MG/DL
GLUCOSE SERPL-MCNC: 95 MG/DL
GLUCOSE SERPL-MCNC: 96 MG/DL
GLUCOSE SERPL-MCNC: 96 MG/DL
GLUCOSE SERPL-MCNC: 97 MG/DL
GLUCOSE SERPL-MCNC: 98 MG/DL (ref 70–110)
HBA1C MFR BLD HPLC: 4.1 %
HCG INTACT+B SERPL-ACNC: 1.7 MIU/ML
HCG INTACT+B SERPL-ACNC: 3.9 MIU/ML
HCT VFR BLD AUTO: 22.6 %
HCT VFR BLD AUTO: 25 %
HCT VFR BLD AUTO: 28.5 %
HCT VFR BLD AUTO: 29.1 %
HCT VFR BLD AUTO: 30.1 %
HCT VFR BLD AUTO: 31.6 %
HCT VFR BLD AUTO: 31.6 %
HCT VFR BLD AUTO: 31.9 %
HCT VFR BLD AUTO: 32.7 %
HCT VFR BLD AUTO: 35.3 %
HCT VFR BLD AUTO: 36.4 %
HCT VFR BLD AUTO: 40.7 %
HCT VFR BLD CALC: 25 %PCV (ref 36–54)
HCT VFR BLD CALC: 27 %PCV (ref 36–54)
HGB BLD-MCNC: 10.1 G/DL
HGB BLD-MCNC: 10.2 G/DL
HGB BLD-MCNC: 10.2 G/DL
HGB BLD-MCNC: 11.2 G/DL
HGB BLD-MCNC: 11.7 G/DL
HGB BLD-MCNC: 13.5 G/DL
HGB BLD-MCNC: 7.2 G/DL
HGB BLD-MCNC: 7.8 G/DL
HGB BLD-MCNC: 8.7 G/DL
HGB BLD-MCNC: 9 G/DL
HGB BLD-MCNC: 9.6 G/DL
HGB BLD-MCNC: 9.9 G/DL
IMM GRANULOCYTES # BLD AUTO: 0.01 K/UL
IMM GRANULOCYTES # BLD AUTO: 0.02 K/UL
IMM GRANULOCYTES # BLD AUTO: 0.02 K/UL
IMM GRANULOCYTES # BLD AUTO: 0.03 K/UL
IMM GRANULOCYTES # BLD AUTO: 0.04 K/UL
IMM GRANULOCYTES # BLD AUTO: 0.05 K/UL
IMM GRANULOCYTES NFR BLD AUTO: 0.3 %
IMM GRANULOCYTES NFR BLD AUTO: 0.4 %
IMM GRANULOCYTES NFR BLD AUTO: 0.4 %
IMM GRANULOCYTES NFR BLD AUTO: 0.5 %
IMM GRANULOCYTES NFR BLD AUTO: 0.8 %
IMM GRANULOCYTES NFR BLD AUTO: 0.9 %
IMM GRANULOCYTES NFR BLD AUTO: 1.1 %
INR PPP: 1.2
LACOSAMIDE: <0.5 MCG/ML
LEVETIRACETAM SERPL-MCNC: <1 UG/ML (ref 3–60)
LYMPHOCYTES # BLD AUTO: 0.5 K/UL
LYMPHOCYTES # BLD AUTO: 0.6 K/UL
LYMPHOCYTES # BLD AUTO: 0.7 K/UL
LYMPHOCYTES # BLD AUTO: 0.8 K/UL
LYMPHOCYTES # BLD AUTO: 0.9 K/UL
LYMPHOCYTES # BLD AUTO: 1 K/UL
LYMPHOCYTES # BLD AUTO: 1 K/UL
LYMPHOCYTES # BLD AUTO: 1.1 K/UL
LYMPHOCYTES NFR BLD: 14.1 %
LYMPHOCYTES NFR BLD: 15.1 %
LYMPHOCYTES NFR BLD: 15.2 %
LYMPHOCYTES NFR BLD: 15.6 %
LYMPHOCYTES NFR BLD: 18.2 %
LYMPHOCYTES NFR BLD: 18.6 %
LYMPHOCYTES NFR BLD: 20.8 %
LYMPHOCYTES NFR BLD: 21.2 %
LYMPHOCYTES NFR BLD: 21.3 %
LYMPHOCYTES NFR BLD: 23.1 %
LYMPHOCYTES NFR BLD: 24.5 %
LYMPHOCYTES NFR BLD: 26.2 %
MAGNESIUM SERPL-MCNC: 1.9 MG/DL
MAGNESIUM SERPL-MCNC: 2.3 MG/DL
MAGNESIUM SERPL-MCNC: 2.3 MG/DL
MAGNESIUM SERPL-MCNC: 2.4 MG/DL
MAGNESIUM SERPL-MCNC: 2.4 MG/DL
MAGNESIUM SERPL-MCNC: 2.6 MG/DL
MCH RBC QN AUTO: 26 PG
MCH RBC QN AUTO: 26.4 PG
MCH RBC QN AUTO: 27.1 PG
MCH RBC QN AUTO: 27.5 PG
MCH RBC QN AUTO: 27.6 PG
MCH RBC QN AUTO: 27.6 PG
MCH RBC QN AUTO: 27.7 PG
MCH RBC QN AUTO: 27.7 PG
MCH RBC QN AUTO: 27.8 PG
MCH RBC QN AUTO: 27.8 PG
MCH RBC QN AUTO: 27.9 PG
MCH RBC QN AUTO: 28 PG
MCHC RBC AUTO-ENTMCNC: 30.5 G/DL
MCHC RBC AUTO-ENTMCNC: 30.9 G/DL
MCHC RBC AUTO-ENTMCNC: 31.2 G/DL
MCHC RBC AUTO-ENTMCNC: 31.2 G/DL
MCHC RBC AUTO-ENTMCNC: 31.3 G/DL
MCHC RBC AUTO-ENTMCNC: 31.7 G/DL
MCHC RBC AUTO-ENTMCNC: 31.7 G/DL
MCHC RBC AUTO-ENTMCNC: 31.9 G/DL
MCHC RBC AUTO-ENTMCNC: 31.9 G/DL
MCHC RBC AUTO-ENTMCNC: 32.1 G/DL
MCHC RBC AUTO-ENTMCNC: 32.3 G/DL
MCHC RBC AUTO-ENTMCNC: 33.2 G/DL
MCV RBC AUTO: 81 FL
MCV RBC AUTO: 82 FL
MCV RBC AUTO: 82 FL
MCV RBC AUTO: 87 FL
MCV RBC AUTO: 88 FL
MCV RBC AUTO: 89 FL
MCV RBC AUTO: 89 FL
MCV RBC AUTO: 91 FL
MITRAL VALVE MOBILITY: NORMAL
MITRAL VALVE REGURGITATION: ABNORMAL
MONOCYTES # BLD AUTO: 0.2 K/UL
MONOCYTES # BLD AUTO: 0.3 K/UL
MONOCYTES # BLD AUTO: 0.4 K/UL
MONOCYTES # BLD AUTO: 0.4 K/UL
MONOCYTES NFR BLD: 10.9 %
MONOCYTES NFR BLD: 4.3 %
MONOCYTES NFR BLD: 5.2 %
MONOCYTES NFR BLD: 5.5 %
MONOCYTES NFR BLD: 6 %
MONOCYTES NFR BLD: 6.3 %
MONOCYTES NFR BLD: 6.4 %
MONOCYTES NFR BLD: 6.6 %
MONOCYTES NFR BLD: 6.9 %
MONOCYTES NFR BLD: 7 %
MONOCYTES NFR BLD: 7.7 %
MONOCYTES NFR BLD: 8.7 %
NEUTROPHILS # BLD AUTO: 1.4 K/UL
NEUTROPHILS # BLD AUTO: 1.7 K/UL
NEUTROPHILS # BLD AUTO: 1.9 K/UL
NEUTROPHILS # BLD AUTO: 2 K/UL
NEUTROPHILS # BLD AUTO: 2.4 K/UL
NEUTROPHILS # BLD AUTO: 2.6 K/UL
NEUTROPHILS # BLD AUTO: 2.8 K/UL
NEUTROPHILS # BLD AUTO: 3.1 K/UL
NEUTROPHILS # BLD AUTO: 3.6 K/UL
NEUTROPHILS # BLD AUTO: 3.9 K/UL
NEUTROPHILS NFR BLD: 49.4 %
NEUTROPHILS NFR BLD: 51.8 %
NEUTROPHILS NFR BLD: 52.6 %
NEUTROPHILS NFR BLD: 53 %
NEUTROPHILS NFR BLD: 54.1 %
NEUTROPHILS NFR BLD: 54.3 %
NEUTROPHILS NFR BLD: 56.1 %
NEUTROPHILS NFR BLD: 59.2 %
NEUTROPHILS NFR BLD: 60.6 %
NEUTROPHILS NFR BLD: 62.5 %
NEUTROPHILS NFR BLD: 64.1 %
NEUTROPHILS NFR BLD: 65.7 %
NRBC BLD-RTO: 0 /100 WBC
NRBC BLD-RTO: 1 /100 WBC
NRBC BLD-RTO: 1 /100 WBC
PHOSPHATE SERPL-MCNC: 3.7 MG/DL
PHOSPHATE SERPL-MCNC: 4.1 MG/DL
PHOSPHATE SERPL-MCNC: 4.5 MG/DL
PHOSPHATE SERPL-MCNC: 4.6 MG/DL
PHOSPHATE SERPL-MCNC: 5.1 MG/DL
PHOSPHATE SERPL-MCNC: 5.3 MG/DL
PLATELET # BLD AUTO: 111 K/UL
PLATELET # BLD AUTO: 49 K/UL
PLATELET # BLD AUTO: 51 K/UL
PLATELET # BLD AUTO: 58 K/UL
PLATELET # BLD AUTO: 58 K/UL
PLATELET # BLD AUTO: 62 K/UL
PLATELET # BLD AUTO: 66 K/UL
PLATELET # BLD AUTO: 69 K/UL
PLATELET # BLD AUTO: 82 K/UL
PLATELET # BLD AUTO: 82 K/UL
PLATELET # BLD AUTO: 91 K/UL
PLATELET # BLD AUTO: 99 K/UL
PMV BLD AUTO: 11 FL
PMV BLD AUTO: 9.4 FL
PMV BLD AUTO: ABNORMAL FL
POC IONIZED CALCIUM: 1.08 MMOL/L (ref 1.06–1.42)
POC IONIZED CALCIUM: 1.13 MMOL/L (ref 1.06–1.42)
POC TCO2 (MEASURED): 24 MMOL/L (ref 23–29)
POC TCO2 (MEASURED): 25 MMOL/L (ref 23–29)
POTASSIUM BLD-SCNC: 3.6 MMOL/L (ref 3.5–5.1)
POTASSIUM BLD-SCNC: 3.8 MMOL/L (ref 3.5–5.1)
POTASSIUM SERPL-SCNC: 3.6 MMOL/L
POTASSIUM SERPL-SCNC: 3.7 MMOL/L
POTASSIUM SERPL-SCNC: 3.9 MMOL/L
POTASSIUM SERPL-SCNC: 3.9 MMOL/L
POTASSIUM SERPL-SCNC: 4 MMOL/L
POTASSIUM SERPL-SCNC: 4.1 MMOL/L
POTASSIUM SERPL-SCNC: 4.3 MMOL/L
POTASSIUM SERPL-SCNC: 4.5 MMOL/L
POTASSIUM SERPL-SCNC: 5.4 MMOL/L
PROT SERPL-MCNC: 6.4 G/DL
PROT SERPL-MCNC: 6.7 G/DL
PROT SERPL-MCNC: 7.6 G/DL
PROT SERPL-MCNC: 7.7 G/DL
PROT SERPL-MCNC: 7.7 G/DL
PROT SERPL-MCNC: 7.8 G/DL
PROT SERPL-MCNC: 8.1 G/DL
PROT SERPL-MCNC: 8.2 G/DL
PROT SERPL-MCNC: 8.3 G/DL
PROT SERPL-MCNC: 8.3 G/DL
PROT SERPL-MCNC: 8.4 G/DL
PROTHROMBIN TIME: 12.1 SEC
RBC # BLD AUTO: 2.57 M/UL
RBC # BLD AUTO: 2.83 M/UL
RBC # BLD AUTO: 3.13 M/UL
RBC # BLD AUTO: 3.27 M/UL
RBC # BLD AUTO: 3.45 M/UL
RBC # BLD AUTO: 3.59 M/UL
RBC # BLD AUTO: 3.64 M/UL
RBC # BLD AUTO: 3.68 M/UL
RBC # BLD AUTO: 3.87 M/UL
RBC # BLD AUTO: 4.01 M/UL
RBC # BLD AUTO: 4.5 M/UL
RBC # BLD AUTO: 4.98 M/UL
RETIRED EF AND QEF - SEE NOTES: 65 (ref 55–65)
SAMPLE: ABNORMAL
SAMPLE: ABNORMAL
SODIUM BLD-SCNC: 139 MMOL/L (ref 136–145)
SODIUM BLD-SCNC: 141 MMOL/L (ref 136–145)
SODIUM SERPL-SCNC: 136 MMOL/L
SODIUM SERPL-SCNC: 137 MMOL/L
SODIUM SERPL-SCNC: 137 MMOL/L
SODIUM SERPL-SCNC: 138 MMOL/L
SODIUM SERPL-SCNC: 138 MMOL/L
SODIUM SERPL-SCNC: 139 MMOL/L
SODIUM SERPL-SCNC: 140 MMOL/L
SODIUM SERPL-SCNC: 140 MMOL/L
SODIUM SERPL-SCNC: 141 MMOL/L
TACROLIMUS BLD-MCNC: 2.9 NG/ML
TACROLIMUS BLD-MCNC: 3 NG/ML
TACROLIMUS BLD-MCNC: 4 NG/ML
TACROLIMUS BLD-MCNC: 6 NG/ML
TACROLIMUS BLD-MCNC: 8.6 NG/ML
TACROLIMUS BLD-MCNC: <1.5 NG/ML
TROPONIN I SERPL DL<=0.01 NG/ML-MCNC: 0.03 NG/ML
TROPONIN I SERPL DL<=0.01 NG/ML-MCNC: 0.31 NG/ML
TROPONIN I SERPL DL<=0.01 NG/ML-MCNC: 0.42 NG/ML
TROPONIN I SERPL DL<=0.01 NG/ML-MCNC: 0.54 NG/ML
TSH SERPL DL<=0.005 MIU/L-ACNC: 1.15 UIU/ML
TSH SERPL DL<=0.005 MIU/L-ACNC: 2.62 UIU/ML
WBC # BLD AUTO: 2.58 K/UL
WBC # BLD AUTO: 3.27 K/UL
WBC # BLD AUTO: 3.47 K/UL
WBC # BLD AUTO: 3.59 K/UL
WBC # BLD AUTO: 3.61 K/UL
WBC # BLD AUTO: 3.88 K/UL
WBC # BLD AUTO: 3.96 K/UL
WBC # BLD AUTO: 4.35 K/UL
WBC # BLD AUTO: 4.4 K/UL
WBC # BLD AUTO: 4.82 K/UL
WBC # BLD AUTO: 6.04 K/UL
WBC # BLD AUTO: 6.5 K/UL

## 2018-01-01 PROCEDURE — 99215 OFFICE O/P EST HI 40 MIN: CPT | Mod: PBBFAC,25,TXP | Performed by: NURSE PRACTITIONER

## 2018-01-01 PROCEDURE — 80053 COMPREHEN METABOLIC PANEL: CPT | Mod: TXP

## 2018-01-01 PROCEDURE — 82330 ASSAY OF CALCIUM: CPT | Mod: NTX

## 2018-01-01 PROCEDURE — 85025 COMPLETE CBC W/AUTO DIFF WBC: CPT | Mod: TXP

## 2018-01-01 PROCEDURE — 25000003 PHARM REV CODE 250: Mod: NTX | Performed by: STUDENT IN AN ORGANIZED HEALTH CARE EDUCATION/TRAINING PROGRAM

## 2018-01-01 PROCEDURE — 25500020 PHARM REV CODE 255: Mod: TXP | Performed by: INTERNAL MEDICINE

## 2018-01-01 PROCEDURE — 84484 ASSAY OF TROPONIN QUANT: CPT | Mod: 91,NTX

## 2018-01-01 PROCEDURE — 20000000 HC ICU ROOM: Mod: NTX

## 2018-01-01 PROCEDURE — 99232 SBSQ HOSP IP/OBS MODERATE 35: CPT | Mod: NTX,,, | Performed by: NURSE PRACTITIONER

## 2018-01-01 PROCEDURE — 99223 1ST HOSP IP/OBS HIGH 75: CPT | Mod: GC,NTX,, | Performed by: INTERNAL MEDICINE

## 2018-01-01 PROCEDURE — 36415 COLL VENOUS BLD VENIPUNCTURE: CPT | Mod: PO,NTX

## 2018-01-01 PROCEDURE — 99999 PR PBB SHADOW E&M-EST. PATIENT-LVL III: CPT | Mod: PBBFAC,,, | Performed by: INTERNAL MEDICINE

## 2018-01-01 PROCEDURE — 63600175 PHARM REV CODE 636 W HCPCS: Mod: NTX | Performed by: EMERGENCY MEDICINE

## 2018-01-01 PROCEDURE — 76536 US EXAM OF HEAD AND NECK: CPT | Mod: TC,NTX

## 2018-01-01 PROCEDURE — 83735 ASSAY OF MAGNESIUM: CPT | Mod: NTX

## 2018-01-01 PROCEDURE — 83880 ASSAY OF NATRIURETIC PEPTIDE: CPT | Mod: NTX

## 2018-01-01 PROCEDURE — 36415 COLL VENOUS BLD VENIPUNCTURE: CPT | Mod: PO,TXP

## 2018-01-01 PROCEDURE — 99213 OFFICE O/P EST LOW 20 MIN: CPT | Mod: PBBFAC,25 | Performed by: INTERNAL MEDICINE

## 2018-01-01 PROCEDURE — 36415 COLL VENOUS BLD VENIPUNCTURE: CPT | Mod: NTX

## 2018-01-01 PROCEDURE — 76770 US EXAM ABDO BACK WALL COMP: CPT | Mod: TC,TXP

## 2018-01-01 PROCEDURE — 93325 DOPPLER ECHO COLOR FLOW MAPG: CPT | Mod: 26,S$PBB,TXP, | Performed by: INTERNAL MEDICINE

## 2018-01-01 PROCEDURE — 80197 ASSAY OF TACROLIMUS: CPT | Mod: NTX

## 2018-01-01 PROCEDURE — 77080 DXA BONE DENSITY AXIAL: CPT | Mod: 26,NTX,, | Performed by: RADIOLOGY

## 2018-01-01 PROCEDURE — 94761 N-INVAS EAR/PLS OXIMETRY MLT: CPT | Mod: NTX

## 2018-01-01 PROCEDURE — 99999 PR PBB SHADOW E&M-EST. PATIENT-LVL III: CPT | Mod: PBBFAC,TXP,, | Performed by: INTERNAL MEDICINE

## 2018-01-01 PROCEDURE — 11000001 HC ACUTE MED/SURG PRIVATE ROOM: Mod: NTX

## 2018-01-01 PROCEDURE — 36415 COLL VENOUS BLD VENIPUNCTURE: CPT | Mod: TXP

## 2018-01-01 PROCEDURE — 93351 STRESS TTE COMPLETE: CPT | Mod: 26,S$PBB,TXP, | Performed by: INTERNAL MEDICINE

## 2018-01-01 PROCEDURE — 96375 TX/PRO/DX INJ NEW DRUG ADDON: CPT | Mod: NTX

## 2018-01-01 PROCEDURE — 99284 EMERGENCY DEPT VISIT MOD MDM: CPT | Mod: 25,NTX

## 2018-01-01 PROCEDURE — 84100 ASSAY OF PHOSPHORUS: CPT | Mod: NTX

## 2018-01-01 PROCEDURE — 99213 OFFICE O/P EST LOW 20 MIN: CPT | Mod: PBBFAC,TXP | Performed by: OPHTHALMOLOGY

## 2018-01-01 PROCEDURE — 63600175 PHARM REV CODE 636 W HCPCS: Mod: NTX | Performed by: STUDENT IN AN ORGANIZED HEALTH CARE EDUCATION/TRAINING PROGRAM

## 2018-01-01 PROCEDURE — 80197 ASSAY OF TACROLIMUS: CPT | Mod: TXP

## 2018-01-01 PROCEDURE — 77080 DXA BONE DENSITY AXIAL: CPT | Mod: TC,NTX

## 2018-01-01 PROCEDURE — 99214 OFFICE O/P EST MOD 30 MIN: CPT | Mod: S$PBB,,, | Performed by: INTERNAL MEDICINE

## 2018-01-01 PROCEDURE — 80053 COMPREHEN METABOLIC PANEL: CPT | Mod: NTX

## 2018-01-01 PROCEDURE — 85025 COMPLETE CBC W/AUTO DIFF WBC: CPT | Mod: NTX

## 2018-01-01 PROCEDURE — 88313 SPECIAL STAINS GROUP 2: CPT | Mod: 26,,, | Performed by: PATHOLOGY

## 2018-01-01 PROCEDURE — 84443 ASSAY THYROID STIM HORMONE: CPT | Mod: NTX

## 2018-01-01 PROCEDURE — 84702 CHORIONIC GONADOTROPIN TEST: CPT | Mod: NTX

## 2018-01-01 PROCEDURE — 63600175 PHARM REV CODE 636 W HCPCS: Mod: NTX | Performed by: PHYSICIAN ASSISTANT

## 2018-01-01 PROCEDURE — 96365 THER/PROPH/DIAG IV INF INIT: CPT | Mod: NTX

## 2018-01-01 PROCEDURE — 99215 OFFICE O/P EST HI 40 MIN: CPT | Mod: S$PBB,TXP,, | Performed by: NURSE PRACTITIONER

## 2018-01-01 PROCEDURE — 90935 HEMODIALYSIS ONE EVALUATION: CPT | Mod: NTX

## 2018-01-01 PROCEDURE — 99239 HOSP IP/OBS DSCHRG MGMT >30: CPT | Mod: NTX,,, | Performed by: HOSPITALIST

## 2018-01-01 PROCEDURE — 80100014 HC HEMODIALYSIS 1:1: Mod: NTX

## 2018-01-01 PROCEDURE — 88342 IMHCHEM/IMCYTCHM 1ST ANTB: CPT | Mod: 26,,, | Performed by: PATHOLOGY

## 2018-01-01 PROCEDURE — 92225 PR SPECIAL EYE EXAM, INITIAL: CPT | Mod: 50,PBBFAC,NTX | Performed by: OPHTHALMOLOGY

## 2018-01-01 PROCEDURE — 99233 SBSQ HOSP IP/OBS HIGH 50: CPT | Mod: NTX,,, | Performed by: NURSE PRACTITIONER

## 2018-01-01 PROCEDURE — 90662 IIV NO PRSV INCREASED AG IM: CPT | Mod: PBBFAC,TXP

## 2018-01-01 PROCEDURE — 80299 QUANTITATIVE ASSAY DRUG: CPT | Mod: NTX

## 2018-01-01 PROCEDURE — 93010 ELECTROCARDIOGRAM REPORT: CPT | Mod: NTX,,, | Performed by: INTERNAL MEDICINE

## 2018-01-01 PROCEDURE — 99233 SBSQ HOSP IP/OBS HIGH 50: CPT | Mod: NTX,,, | Performed by: INTERNAL MEDICINE

## 2018-01-01 PROCEDURE — 93005 ELECTROCARDIOGRAM TRACING: CPT | Mod: NTX

## 2018-01-01 PROCEDURE — 76536 US EXAM OF HEAD AND NECK: CPT | Mod: 26,NTX,, | Performed by: RADIOLOGY

## 2018-01-01 PROCEDURE — 99213 OFFICE O/P EST LOW 20 MIN: CPT | Mod: PBBFAC,PO,NTX | Performed by: NEUROLOGICAL SURGERY

## 2018-01-01 PROCEDURE — 76700 US EXAM ABDOM COMPLETE: CPT | Mod: 26,NTX,, | Performed by: RADIOLOGY

## 2018-01-01 PROCEDURE — 99238 HOSP IP/OBS DSCHRG MGMT 30/<: CPT | Mod: GC,NTX,, | Performed by: INTERNAL MEDICINE

## 2018-01-01 PROCEDURE — 96366 THER/PROPH/DIAG IV INF ADDON: CPT | Mod: NTX

## 2018-01-01 PROCEDURE — 25000003 PHARM REV CODE 250: Mod: NTX | Performed by: INTERNAL MEDICINE

## 2018-01-01 PROCEDURE — 99214 OFFICE O/P EST MOD 30 MIN: CPT | Mod: S$PBB,NTX,, | Performed by: INTERNAL MEDICINE

## 2018-01-01 PROCEDURE — 85610 PROTHROMBIN TIME: CPT | Mod: NTX

## 2018-01-01 PROCEDURE — 92014 COMPRE OPH EXAM EST PT 1/>: CPT | Mod: S$PBB,,, | Performed by: OPHTHALMOLOGY

## 2018-01-01 PROCEDURE — 99291 CRITICAL CARE FIRST HOUR: CPT | Mod: ,,, | Performed by: EMERGENCY MEDICINE

## 2018-01-01 PROCEDURE — 63600175 PHARM REV CODE 636 W HCPCS: Mod: NTX | Performed by: INTERNAL MEDICINE

## 2018-01-01 PROCEDURE — 99283 EMERGENCY DEPT VISIT LOW MDM: CPT | Mod: NTX

## 2018-01-01 PROCEDURE — 84484 ASSAY OF TROPONIN QUANT: CPT | Mod: NTX

## 2018-01-01 PROCEDURE — 99233 SBSQ HOSP IP/OBS HIGH 50: CPT | Mod: GC,NTX,, | Performed by: INTERNAL MEDICINE

## 2018-01-01 PROCEDURE — 99999 PR PBB SHADOW E&M-EST. PATIENT-LVL III: CPT | Mod: PBBFAC,,, | Performed by: OPHTHALMOLOGY

## 2018-01-01 PROCEDURE — 99244 OFF/OP CNSLTJ NEW/EST MOD 40: CPT | Mod: S$PBB,TXP,, | Performed by: SURGERY

## 2018-01-01 PROCEDURE — 99204 OFFICE O/P NEW MOD 45 MIN: CPT | Mod: S$PBB,TXP,, | Performed by: PHYSICIAN ASSISTANT

## 2018-01-01 PROCEDURE — 99291 CRITICAL CARE FIRST HOUR: CPT | Mod: NTX,,, | Performed by: PHYSICIAN ASSISTANT

## 2018-01-01 PROCEDURE — G0008 ADMIN INFLUENZA VIRUS VAC: HCPCS | Mod: PBBFAC,TXP

## 2018-01-01 PROCEDURE — 80177 DRUG SCRN QUAN LEVETIRACETAM: CPT | Mod: NTX

## 2018-01-01 PROCEDURE — 99496 TRANSJ CARE MGMT HIGH F2F 7D: CPT | Mod: S$PBB,,, | Performed by: INTERNAL MEDICINE

## 2018-01-01 PROCEDURE — 99214 OFFICE O/P EST MOD 30 MIN: CPT | Mod: PBBFAC,25,NTX | Performed by: NURSE PRACTITIONER

## 2018-01-01 PROCEDURE — 93978 VASCULAR STUDY: CPT | Mod: 26,TXP,, | Performed by: RADIOLOGY

## 2018-01-01 PROCEDURE — 99284 EMERGENCY DEPT VISIT MOD MDM: CPT | Mod: NTX,,, | Performed by: EMERGENCY MEDICINE

## 2018-01-01 PROCEDURE — 25000003 PHARM REV CODE 250: Mod: NTX | Performed by: EMERGENCY MEDICINE

## 2018-01-01 PROCEDURE — 80076 HEPATIC FUNCTION PANEL: CPT | Mod: NTX

## 2018-01-01 PROCEDURE — 83036 HEMOGLOBIN GLYCOSYLATED A1C: CPT | Mod: NTX

## 2018-01-01 PROCEDURE — 27000221 HC OXYGEN, UP TO 24 HOURS: Mod: NTX

## 2018-01-01 PROCEDURE — 76770 US EXAM ABDO BACK WALL COMP: CPT | Mod: 26,TXP,, | Performed by: RADIOLOGY

## 2018-01-01 PROCEDURE — 92225 PR SPECIAL EYE EXAM, INITIAL: CPT | Mod: 50,S$PBB,, | Performed by: OPHTHALMOLOGY

## 2018-01-01 PROCEDURE — 25000003 PHARM REV CODE 250: Mod: NTX | Performed by: NURSE PRACTITIONER

## 2018-01-01 PROCEDURE — 25000003 PHARM REV CODE 250: Mod: NTX | Performed by: PHYSICIAN ASSISTANT

## 2018-01-01 PROCEDURE — 99213 OFFICE O/P EST LOW 20 MIN: CPT | Mod: PBBFAC,TXP | Performed by: INTERNAL MEDICINE

## 2018-01-01 PROCEDURE — 99213 OFFICE O/P EST LOW 20 MIN: CPT | Mod: PBBFAC | Performed by: INTERNAL MEDICINE

## 2018-01-01 PROCEDURE — 63600175 PHARM REV CODE 636 W HCPCS: Mod: NTX | Performed by: RADIOLOGY

## 2018-01-01 PROCEDURE — 99284 EMERGENCY DEPT VISIT MOD MDM: CPT | Mod: NTX

## 2018-01-01 PROCEDURE — 99212 OFFICE O/P EST SF 10 MIN: CPT | Mod: PBBFAC | Performed by: OPHTHALMOLOGY

## 2018-01-01 PROCEDURE — 99999 PR PBB SHADOW E&M-EST. PATIENT-LVL IV: CPT | Mod: PBBFAC,TXP,, | Performed by: NURSE PRACTITIONER

## 2018-01-01 PROCEDURE — 90471 IMMUNIZATION ADMIN: CPT | Mod: PBBFAC,TXP

## 2018-01-01 PROCEDURE — 99214 OFFICE O/P EST MOD 30 MIN: CPT | Mod: S$PBB,NTX,, | Performed by: NEUROLOGICAL SURGERY

## 2018-01-01 PROCEDURE — 96376 TX/PRO/DX INJ SAME DRUG ADON: CPT | Mod: NTX

## 2018-01-01 PROCEDURE — 92134 CPTRZ OPH DX IMG PST SGM RTA: CPT | Mod: PBBFAC,TXP | Performed by: OPHTHALMOLOGY

## 2018-01-01 PROCEDURE — 99232 SBSQ HOSP IP/OBS MODERATE 35: CPT | Mod: GC,NTX,, | Performed by: INTERNAL MEDICINE

## 2018-01-01 PROCEDURE — 88342 IMHCHEM/IMCYTCHM 1ST ANTB: CPT | Mod: NTX,59 | Performed by: PATHOLOGY

## 2018-01-01 PROCEDURE — 76700 US EXAM ABDOM COMPLETE: CPT | Mod: TC,NTX

## 2018-01-01 PROCEDURE — 88307 TISSUE EXAM BY PATHOLOGIST: CPT | Mod: 26,,, | Performed by: PATHOLOGY

## 2018-01-01 PROCEDURE — 88313 SPECIAL STAINS GROUP 2: CPT | Mod: NTX,59 | Performed by: PATHOLOGY

## 2018-01-01 PROCEDURE — 87040 BLOOD CULTURE FOR BACTERIA: CPT | Mod: 59,NTX

## 2018-01-01 PROCEDURE — 99999 PR PBB SHADOW E&M-EST. PATIENT-LVL III: CPT | Mod: PBBFAC,TXP,, | Performed by: NEUROLOGICAL SURGERY

## 2018-01-01 PROCEDURE — 99999 PR PBB SHADOW E&M-EST. PATIENT-LVL II: CPT | Mod: PBBFAC,,, | Performed by: OPHTHALMOLOGY

## 2018-01-01 PROCEDURE — 99204 OFFICE O/P NEW MOD 45 MIN: CPT | Mod: NTX,S$GLB,, | Performed by: SURGERY

## 2018-01-01 PROCEDURE — 92004 COMPRE OPH EXAM NEW PT 1/>: CPT | Mod: S$PBB,,, | Performed by: OPHTHALMOLOGY

## 2018-01-01 PROCEDURE — 90935 HEMODIALYSIS ONE EVALUATION: CPT | Mod: NTX,,, | Performed by: INTERNAL MEDICINE

## 2018-01-01 PROCEDURE — 99999 PR PBB SHADOW E&M-EST. PATIENT-LVL V: CPT | Mod: PBBFAC,TXP,, | Performed by: NURSE PRACTITIONER

## 2018-01-01 PROCEDURE — 99213 OFFICE O/P EST LOW 20 MIN: CPT | Mod: PBBFAC,27,NTX | Performed by: INTERNAL MEDICINE

## 2018-01-01 PROCEDURE — 93978 VASCULAR STUDY: CPT | Mod: TC,TXP

## 2018-01-01 PROCEDURE — 99291 CRITICAL CARE FIRST HOUR: CPT | Mod: 25,NTX

## 2018-01-01 PROCEDURE — 99496 TRANSJ CARE MGMT HIGH F2F 7D: CPT | Mod: PBBFAC,NTX | Performed by: INTERNAL MEDICINE

## 2018-01-01 PROCEDURE — 93320 DOPPLER ECHO COMPLETE: CPT | Mod: PBBFAC,TXP | Performed by: INTERNAL MEDICINE

## 2018-01-01 RX ORDER — MYCOPHENOLATE MOFETIL 250 MG/1
CAPSULE ORAL
Qty: 240 CAPSULE | Refills: 0 | Status: ON HOLD | OUTPATIENT
Start: 2018-01-01 | End: 2018-01-01 | Stop reason: HOSPADM

## 2018-01-01 RX ORDER — BUTALBITAL, ACETAMINOPHEN AND CAFFEINE 50; 325; 40 MG/1; MG/1; MG/1
2 TABLET ORAL EVERY 6 HOURS PRN
Status: DISCONTINUED | OUTPATIENT
Start: 2018-01-01 | End: 2018-01-01 | Stop reason: HOSPADM

## 2018-01-01 RX ORDER — BUTALBITAL, ACETAMINOPHEN AND CAFFEINE 50; 325; 40 MG/1; MG/1; MG/1
1 TABLET ORAL
Status: COMPLETED | OUTPATIENT
Start: 2018-01-01 | End: 2018-01-01

## 2018-01-01 RX ORDER — BUTALBITAL, ACETAMINOPHEN AND CAFFEINE 50; 325; 40 MG/1; MG/1; MG/1
2 TABLET ORAL EVERY 8 HOURS PRN
Qty: 30 TABLET | Refills: 1 | Status: ON HOLD | OUTPATIENT
Start: 2018-01-01 | End: 2018-01-01 | Stop reason: HOSPADM

## 2018-01-01 RX ORDER — RAMELTEON 8 MG/1
8 TABLET ORAL NIGHTLY PRN
Status: DISCONTINUED | OUTPATIENT
Start: 2018-01-01 | End: 2018-01-01 | Stop reason: HOSPADM

## 2018-01-01 RX ORDER — TACROLIMUS 1 MG/1
1 CAPSULE ORAL EVERY MORNING
Status: DISCONTINUED | OUTPATIENT
Start: 2018-01-01 | End: 2018-01-01

## 2018-01-01 RX ORDER — IRBESARTAN 300 MG/1
300 TABLET ORAL DAILY
Qty: 30 TABLET | Refills: 3 | Status: ON HOLD | OUTPATIENT
Start: 2018-01-01 | End: 2019-01-01 | Stop reason: SDUPTHER

## 2018-01-01 RX ORDER — MIDAZOLAM HYDROCHLORIDE 1 MG/ML
INJECTION INTRAMUSCULAR; INTRAVENOUS CODE/TRAUMA/SEDATION MEDICATION
Status: COMPLETED | OUTPATIENT
Start: 2018-01-01 | End: 2018-01-01

## 2018-01-01 RX ORDER — HYDRALAZINE HYDROCHLORIDE 20 MG/ML
10 INJECTION INTRAMUSCULAR; INTRAVENOUS
Status: COMPLETED | OUTPATIENT
Start: 2018-01-01 | End: 2018-01-01

## 2018-01-01 RX ORDER — ACETAMINOPHEN 325 MG/1
650 TABLET ORAL EVERY 4 HOURS PRN
Status: DISCONTINUED | OUTPATIENT
Start: 2018-01-01 | End: 2018-01-01 | Stop reason: HOSPADM

## 2018-01-01 RX ORDER — PREDNISONE 1 MG/1
TABLET ORAL
Qty: 30 TABLET | Refills: 1 | Status: ON HOLD | OUTPATIENT
Start: 2018-01-01 | End: 2018-01-01 | Stop reason: HOSPADM

## 2018-01-01 RX ORDER — SODIUM CHLORIDE 0.9 % (FLUSH) 0.9 %
3 SYRINGE (ML) INJECTION
Status: DISCONTINUED | OUTPATIENT
Start: 2018-01-01 | End: 2018-01-01 | Stop reason: HOSPADM

## 2018-01-01 RX ORDER — CLONIDINE HYDROCHLORIDE 0.1 MG/1
0.1 TABLET ORAL
Status: COMPLETED | OUTPATIENT
Start: 2018-01-01 | End: 2018-01-01

## 2018-01-01 RX ORDER — CITALOPRAM 20 MG/1
20 TABLET, FILM COATED ORAL DAILY
Status: DISCONTINUED | OUTPATIENT
Start: 2018-01-01 | End: 2018-01-01 | Stop reason: HOSPADM

## 2018-01-01 RX ORDER — PREDNISONE 1 MG/1
1 TABLET ORAL EVERY OTHER DAY
Status: DISCONTINUED | OUTPATIENT
Start: 2018-01-01 | End: 2018-01-01

## 2018-01-01 RX ORDER — CARVEDILOL 25 MG/1
25 TABLET ORAL 2 TIMES DAILY
Status: DISCONTINUED | OUTPATIENT
Start: 2018-01-01 | End: 2018-01-01 | Stop reason: HOSPADM

## 2018-01-01 RX ORDER — NICARDIPINE HYDROCHLORIDE 0.2 MG/ML
2.5 INJECTION INTRAVENOUS CONTINUOUS
Status: DISCONTINUED | OUTPATIENT
Start: 2018-01-01 | End: 2018-01-01

## 2018-01-01 RX ORDER — SODIUM CHLORIDE 9 MG/ML
INJECTION, SOLUTION INTRAVENOUS ONCE
Status: DISCONTINUED | OUTPATIENT
Start: 2018-01-01 | End: 2018-01-01 | Stop reason: HOSPADM

## 2018-01-01 RX ORDER — ONDANSETRON 8 MG/1
8 TABLET, ORALLY DISINTEGRATING ORAL EVERY 8 HOURS PRN
Status: DISCONTINUED | OUTPATIENT
Start: 2018-01-01 | End: 2018-01-01

## 2018-01-01 RX ORDER — NIFEDIPINE 60 MG/1
120 TABLET, EXTENDED RELEASE ORAL DAILY
Qty: 60 TABLET | Refills: 1 | Status: SHIPPED | OUTPATIENT
Start: 2018-01-01 | End: 2019-01-01

## 2018-01-01 RX ORDER — LEVETIRACETAM 500 MG/1
500 TABLET ORAL 2 TIMES DAILY
Qty: 60 TABLET | Refills: 11 | Status: ON HOLD | OUTPATIENT
Start: 2018-01-01 | End: 2019-01-01 | Stop reason: SDUPTHER

## 2018-01-01 RX ORDER — TACROLIMUS 1 MG/1
6 CAPSULE ORAL EVERY 12 HOURS
Qty: 360 CAPSULE | Refills: 11 | Status: ON HOLD | OUTPATIENT
Start: 2018-01-01 | End: 2019-01-01 | Stop reason: HOSPADM

## 2018-01-01 RX ORDER — MINOXIDIL 2.5 MG/1
5 TABLET ORAL 2 TIMES DAILY
Qty: 120 TABLET | Refills: 1 | Status: SHIPPED | OUTPATIENT
Start: 2018-01-01 | End: 2018-01-01 | Stop reason: SDUPTHER

## 2018-01-01 RX ORDER — OXYCODONE AND ACETAMINOPHEN 5; 325 MG/1; MG/1
1 TABLET ORAL
Status: COMPLETED | OUTPATIENT
Start: 2018-01-01 | End: 2018-01-01

## 2018-01-01 RX ORDER — CLONIDINE HYDROCHLORIDE 0.3 MG/1
0.3 TABLET ORAL ONCE
Status: COMPLETED | OUTPATIENT
Start: 2018-01-01 | End: 2018-01-01

## 2018-01-01 RX ORDER — LABETALOL HYDROCHLORIDE 5 MG/ML
10 INJECTION, SOLUTION INTRAVENOUS
Status: COMPLETED | OUTPATIENT
Start: 2018-01-01 | End: 2018-01-01

## 2018-01-01 RX ORDER — PROMETHAZINE HYDROCHLORIDE 25 MG/1
25 TABLET ORAL EVERY 6 HOURS PRN
Status: DISCONTINUED | OUTPATIENT
Start: 2018-01-01 | End: 2018-01-01

## 2018-01-01 RX ORDER — NAPROXEN SODIUM 220 MG/1
81 TABLET, FILM COATED ORAL DAILY
Status: DISCONTINUED | OUTPATIENT
Start: 2018-01-01 | End: 2018-01-01 | Stop reason: HOSPADM

## 2018-01-01 RX ORDER — NIFEDIPINE 30 MG/1
60 TABLET, EXTENDED RELEASE ORAL DAILY
Status: DISCONTINUED | OUTPATIENT
Start: 2018-01-01 | End: 2018-01-01

## 2018-01-01 RX ORDER — BUTALBITAL, ACETAMINOPHEN AND CAFFEINE 50; 325; 40 MG/1; MG/1; MG/1
2 TABLET ORAL
Status: COMPLETED | OUTPATIENT
Start: 2018-01-01 | End: 2018-01-01

## 2018-01-01 RX ORDER — LANOLIN ALCOHOL/MO/W.PET/CERES
800 CREAM (GRAM) TOPICAL
Status: DISCONTINUED | OUTPATIENT
Start: 2018-01-01 | End: 2018-01-01 | Stop reason: HOSPADM

## 2018-01-01 RX ORDER — CARVEDILOL 6.25 MG/1
25 TABLET ORAL
Status: COMPLETED | OUTPATIENT
Start: 2018-01-01 | End: 2018-01-01

## 2018-01-01 RX ORDER — HYDRALAZINE HYDROCHLORIDE 100 MG/1
100 TABLET, FILM COATED ORAL EVERY 12 HOURS
Qty: 60 TABLET | Refills: 3 | Status: SHIPPED | OUTPATIENT
Start: 2018-01-01 | End: 2019-01-01 | Stop reason: SDUPTHER

## 2018-01-01 RX ORDER — HYDRALAZINE HYDROCHLORIDE 50 MG/1
100 TABLET, FILM COATED ORAL EVERY 12 HOURS
Status: DISCONTINUED | OUTPATIENT
Start: 2018-01-01 | End: 2018-01-01

## 2018-01-01 RX ORDER — BUTALBITAL, ACETAMINOPHEN AND CAFFEINE 50; 325; 40 MG/1; MG/1; MG/1
1 TABLET ORAL ONCE
Status: COMPLETED | OUTPATIENT
Start: 2018-01-01 | End: 2018-01-01

## 2018-01-01 RX ORDER — HYDRALAZINE HYDROCHLORIDE 50 MG/1
100 TABLET, FILM COATED ORAL EVERY 12 HOURS
Status: DISCONTINUED | OUTPATIENT
Start: 2018-01-01 | End: 2018-01-01 | Stop reason: HOSPADM

## 2018-01-01 RX ORDER — PROMETHAZINE HYDROCHLORIDE 25 MG/1
25 TABLET ORAL EVERY 6 HOURS PRN
Status: DISCONTINUED | OUTPATIENT
Start: 2018-01-01 | End: 2018-01-01 | Stop reason: HOSPADM

## 2018-01-01 RX ORDER — CLONIDINE HYDROCHLORIDE 0.1 MG/1
0.3 TABLET ORAL
Status: COMPLETED | OUTPATIENT
Start: 2018-01-01 | End: 2018-01-01

## 2018-01-01 RX ORDER — HYDRALAZINE HYDROCHLORIDE 100 MG/1
100 TABLET, FILM COATED ORAL EVERY 12 HOURS
Qty: 60 TABLET | Refills: 1 | Status: SHIPPED | OUTPATIENT
Start: 2018-01-01 | End: 2018-01-01 | Stop reason: SDUPTHER

## 2018-01-01 RX ORDER — TACROLIMUS 5 MG/1
5 CAPSULE ORAL 2 TIMES DAILY
Status: DISCONTINUED | OUTPATIENT
Start: 2018-01-01 | End: 2018-01-01

## 2018-01-01 RX ORDER — ACETAMINOPHEN AND CODEINE PHOSPHATE 300; 30 MG/1; MG/1
1 TABLET ORAL EVERY 6 HOURS PRN
Qty: 12 TABLET | Refills: 0 | Status: SHIPPED | OUTPATIENT
Start: 2018-01-01 | End: 2018-01-01

## 2018-01-01 RX ORDER — MINOXIDIL 2.5 MG/1
5 TABLET ORAL 2 TIMES DAILY
Qty: 120 TABLET | Refills: 3 | Status: SHIPPED | OUTPATIENT
Start: 2018-01-01 | End: 2019-01-01

## 2018-01-01 RX ORDER — MYCOPHENOLATE MOFETIL 250 MG/1
250 CAPSULE ORAL 2 TIMES DAILY
Status: DISCONTINUED | OUTPATIENT
Start: 2018-01-01 | End: 2018-01-01

## 2018-01-01 RX ORDER — PENICILLIN V POTASSIUM 250 MG/1
500 TABLET, FILM COATED ORAL
Status: COMPLETED | OUTPATIENT
Start: 2018-01-01 | End: 2018-01-01

## 2018-01-01 RX ORDER — NIFEDIPINE 30 MG/1
120 TABLET, EXTENDED RELEASE ORAL DAILY
Status: DISCONTINUED | OUTPATIENT
Start: 2018-01-01 | End: 2018-01-01 | Stop reason: HOSPADM

## 2018-01-01 RX ORDER — FENTANYL CITRATE 50 UG/ML
INJECTION, SOLUTION INTRAMUSCULAR; INTRAVENOUS CODE/TRAUMA/SEDATION MEDICATION
Status: COMPLETED | OUTPATIENT
Start: 2018-01-01 | End: 2018-01-01

## 2018-01-01 RX ORDER — HYDRALAZINE HYDROCHLORIDE 25 MG/1
100 TABLET, FILM COATED ORAL
Status: COMPLETED | OUTPATIENT
Start: 2018-01-01 | End: 2018-01-01

## 2018-01-01 RX ORDER — FENTANYL CITRATE 50 UG/ML
50 INJECTION, SOLUTION INTRAMUSCULAR; INTRAVENOUS
Status: CANCELLED | OUTPATIENT
Start: 2018-01-01

## 2018-01-01 RX ORDER — LABETALOL HYDROCHLORIDE 5 MG/ML
5 INJECTION, SOLUTION INTRAVENOUS
Status: COMPLETED | OUTPATIENT
Start: 2018-01-01 | End: 2018-01-01

## 2018-01-01 RX ORDER — SODIUM CHLORIDE 9 MG/ML
INJECTION, SOLUTION INTRAVENOUS
Status: DISCONTINUED | OUTPATIENT
Start: 2018-01-01 | End: 2018-01-01 | Stop reason: HOSPADM

## 2018-01-01 RX ORDER — POTASSIUM CHLORIDE 20 MEQ/15ML
40 SOLUTION ORAL
Status: DISCONTINUED | OUTPATIENT
Start: 2018-01-01 | End: 2018-01-01 | Stop reason: HOSPADM

## 2018-01-01 RX ORDER — PENICILLIN V POTASSIUM 500 MG/1
500 TABLET, FILM COATED ORAL 4 TIMES DAILY
Qty: 40 TABLET | Refills: 0 | Status: SHIPPED | OUTPATIENT
Start: 2018-01-01 | End: 2019-01-01

## 2018-01-01 RX ORDER — NICARDIPINE HYDROCHLORIDE 0.2 MG/ML
INJECTION INTRAVENOUS
Status: DISPENSED
Start: 2018-01-01 | End: 2018-01-01

## 2018-01-01 RX ORDER — NICARDIPINE HYDROCHLORIDE 0.2 MG/ML
2.5 INJECTION INTRAVENOUS CONTINUOUS
Status: DISCONTINUED | OUTPATIENT
Start: 2018-01-01 | End: 2018-01-01 | Stop reason: SDUPTHER

## 2018-01-01 RX ORDER — LIDOCAINE HYDROCHLORIDE 20 MG/ML
10 JELLY TOPICAL
Status: DISCONTINUED | OUTPATIENT
Start: 2018-01-01 | End: 2018-01-01

## 2018-01-01 RX ORDER — ZOLPIDEM TARTRATE 5 MG/1
5 TABLET ORAL NIGHTLY PRN
Status: DISCONTINUED | OUTPATIENT
Start: 2018-01-01 | End: 2018-01-01

## 2018-01-01 RX ORDER — IRBESARTAN 300 MG/1
300 TABLET ORAL DAILY
Status: DISCONTINUED | OUTPATIENT
Start: 2018-01-01 | End: 2018-01-01 | Stop reason: HOSPADM

## 2018-01-01 RX ORDER — SODIUM CHLORIDE 9 MG/ML
INJECTION, SOLUTION INTRAVENOUS ONCE
Status: COMPLETED | OUTPATIENT
Start: 2018-01-01 | End: 2018-01-01

## 2018-01-01 RX ORDER — PENICILLIN V POTASSIUM 500 MG/1
500 TABLET, FILM COATED ORAL EVERY 6 HOURS
Qty: 40 TABLET | Refills: 0 | Status: SHIPPED | OUTPATIENT
Start: 2018-01-01 | End: 2018-01-01

## 2018-01-01 RX ORDER — SODIUM CHLORIDE 9 MG/ML
INJECTION, SOLUTION INTRAVENOUS ONCE
Status: CANCELLED | OUTPATIENT
Start: 2018-01-01 | End: 2018-01-01

## 2018-01-01 RX ORDER — IRBESARTAN 75 MG/1
300 TABLET ORAL DAILY
Status: DISCONTINUED | OUTPATIENT
Start: 2018-01-01 | End: 2018-01-01 | Stop reason: HOSPADM

## 2018-01-01 RX ORDER — CLONIDINE 0.3 MG/24H
1 PATCH, EXTENDED RELEASE TRANSDERMAL
Status: DISCONTINUED | OUTPATIENT
Start: 2018-01-01 | End: 2018-01-01 | Stop reason: HOSPADM

## 2018-01-01 RX ORDER — MINOXIDIL 2.5 MG/1
5 TABLET ORAL 2 TIMES DAILY
Status: DISCONTINUED | OUTPATIENT
Start: 2018-01-01 | End: 2018-01-01 | Stop reason: HOSPADM

## 2018-01-01 RX ORDER — CINACALCET 30 MG/1
30 TABLET, FILM COATED ORAL
Qty: 30 TABLET | Refills: 2 | Status: ON HOLD | OUTPATIENT
Start: 2018-01-01 | End: 2019-01-01 | Stop reason: HOSPADM

## 2018-01-01 RX ORDER — LIDOCAINE HYDROCHLORIDE 20 MG/ML
JELLY TOPICAL
Status: DISCONTINUED | OUTPATIENT
Start: 2018-01-01 | End: 2018-01-01

## 2018-01-01 RX ORDER — CLONIDINE HYDROCHLORIDE 0.1 MG/1
0.2 TABLET ORAL
Status: COMPLETED | OUTPATIENT
Start: 2018-01-01 | End: 2018-01-01

## 2018-01-01 RX ORDER — ONDANSETRON 8 MG/1
8 TABLET, ORALLY DISINTEGRATING ORAL EVERY 8 HOURS PRN
Status: DISCONTINUED | OUTPATIENT
Start: 2018-01-01 | End: 2018-01-01 | Stop reason: HOSPADM

## 2018-01-01 RX ORDER — SODIUM,POTASSIUM PHOSPHATES 280-250MG
2 POWDER IN PACKET (EA) ORAL
Status: DISCONTINUED | OUTPATIENT
Start: 2018-01-01 | End: 2018-01-01 | Stop reason: HOSPADM

## 2018-01-01 RX ORDER — LEVETIRACETAM 10 MG/ML
1000 INJECTION INTRAVASCULAR
Status: COMPLETED | OUTPATIENT
Start: 2018-01-01 | End: 2018-01-01

## 2018-01-01 RX ORDER — HEPARIN SODIUM 5000 [USP'U]/ML
5000 INJECTION, SOLUTION INTRAVENOUS; SUBCUTANEOUS EVERY 8 HOURS
Status: DISCONTINUED | OUTPATIENT
Start: 2018-01-01 | End: 2018-01-01

## 2018-01-01 RX ORDER — CHLORHEXIDINE GLUCONATE ORAL RINSE 1.2 MG/ML
15 SOLUTION DENTAL 2 TIMES DAILY
Qty: 118 ML | Refills: 0 | Status: SHIPPED | OUTPATIENT
Start: 2018-01-01 | End: 2019-01-01

## 2018-01-01 RX ORDER — SODIUM CHLORIDE 9 MG/ML
500 INJECTION, SOLUTION INTRAVENOUS ONCE
Status: DISCONTINUED | OUTPATIENT
Start: 2018-01-01 | End: 2018-01-01 | Stop reason: HOSPADM

## 2018-01-01 RX ORDER — CARVEDILOL 25 MG/1
25 TABLET ORAL 2 TIMES DAILY
Qty: 60 TABLET | Refills: 11 | Status: SHIPPED | OUTPATIENT
Start: 2018-01-01 | End: 2019-01-01 | Stop reason: SDUPTHER

## 2018-01-01 RX ORDER — IRBESARTAN 300 MG/1
300 TABLET ORAL DAILY
Qty: 30 TABLET | Refills: 1 | Status: SHIPPED | OUTPATIENT
Start: 2018-01-01 | End: 2018-01-01 | Stop reason: SDUPTHER

## 2018-01-01 RX ORDER — HYDRALAZINE HYDROCHLORIDE 50 MG/1
100 TABLET, FILM COATED ORAL
Status: DISCONTINUED | OUTPATIENT
Start: 2018-01-01 | End: 2018-01-01

## 2018-01-01 RX ORDER — HYDROXYZINE HYDROCHLORIDE 25 MG/1
25 TABLET, FILM COATED ORAL 3 TIMES DAILY PRN
Status: DISCONTINUED | OUTPATIENT
Start: 2018-01-01 | End: 2018-01-01 | Stop reason: HOSPADM

## 2018-01-01 RX ORDER — ACETAMINOPHEN 500 MG
1000 TABLET ORAL
Status: COMPLETED | OUTPATIENT
Start: 2018-01-01 | End: 2018-01-01

## 2018-01-01 RX ORDER — PREDNISONE 1 MG/1
1 TABLET ORAL DAILY
Status: DISCONTINUED | OUTPATIENT
Start: 2018-01-01 | End: 2018-01-01

## 2018-01-01 RX ORDER — FAMOTIDINE 20 MG/1
20 TABLET, FILM COATED ORAL DAILY
Status: DISCONTINUED | OUTPATIENT
Start: 2018-01-01 | End: 2018-01-01 | Stop reason: HOSPADM

## 2018-01-01 RX ORDER — CINACALCET 30 MG/1
30 TABLET, FILM COATED ORAL
Status: DISCONTINUED | OUTPATIENT
Start: 2018-01-01 | End: 2018-01-01 | Stop reason: HOSPADM

## 2018-01-01 RX ORDER — LACOSAMIDE 50 MG/1
50 TABLET ORAL 2 TIMES DAILY
Qty: 60 TABLET | Refills: 11 | Status: ON HOLD | OUTPATIENT
Start: 2018-01-01 | End: 2019-01-01 | Stop reason: SDUPTHER

## 2018-01-01 RX ORDER — FAMOTIDINE 20 MG/1
20 TABLET, FILM COATED ORAL DAILY
Status: DISCONTINUED | OUTPATIENT
Start: 2018-01-01 | End: 2018-01-01

## 2018-01-01 RX ORDER — HEPARIN SODIUM 5000 [USP'U]/ML
5000 INJECTION, SOLUTION INTRAVENOUS; SUBCUTANEOUS EVERY 8 HOURS
Status: DISCONTINUED | OUTPATIENT
Start: 2018-01-01 | End: 2018-01-01 | Stop reason: HOSPADM

## 2018-01-01 RX ORDER — CARVEDILOL 25 MG/1
25 TABLET ORAL 2 TIMES DAILY
Qty: 60 TABLET | Refills: 1 | Status: SHIPPED | OUTPATIENT
Start: 2018-01-01 | End: 2018-01-01

## 2018-01-01 RX ORDER — MIDAZOLAM HYDROCHLORIDE 1 MG/ML
1 INJECTION INTRAMUSCULAR; INTRAVENOUS
Status: CANCELLED | OUTPATIENT
Start: 2018-01-01

## 2018-01-01 RX ADMIN — HYDRALAZINE HYDROCHLORIDE 100 MG: 50 TABLET ORAL at 08:09

## 2018-01-01 RX ADMIN — NIFEDIPINE 120 MG: 30 TABLET, FILM COATED, EXTENDED RELEASE ORAL at 08:09

## 2018-01-01 RX ADMIN — FENTANYL CITRATE 50 MCG: 50 INJECTION, SOLUTION INTRAMUSCULAR; INTRAVENOUS at 01:11

## 2018-01-01 RX ADMIN — FAMOTIDINE 20 MG: 20 TABLET, FILM COATED ORAL at 08:09

## 2018-01-01 RX ADMIN — OXYCODONE AND ACETAMINOPHEN 1 TABLET: 5; 325 TABLET ORAL at 06:12

## 2018-01-01 RX ADMIN — MINOXIDIL 5 MG: 2.5 TABLET ORAL at 10:10

## 2018-01-01 RX ADMIN — PENICILLIN V POTASSIUM 500 MG: 250 TABLET, FILM COATED ORAL at 06:12

## 2018-01-01 RX ADMIN — NIFEDIPINE 60 MG: 30 TABLET, FILM COATED, EXTENDED RELEASE ORAL at 08:10

## 2018-01-01 RX ADMIN — HYDRALAZINE HYDROCHLORIDE 100 MG: 50 TABLET ORAL at 09:10

## 2018-01-01 RX ADMIN — ONDANSETRON 8 MG: 8 TABLET, ORALLY DISINTEGRATING ORAL at 09:10

## 2018-01-01 RX ADMIN — CLONIDINE 1 PATCH: 0.3 PATCH TRANSDERMAL at 08:09

## 2018-01-01 RX ADMIN — LABETALOL HCL 500 MG: 300 TABLET, FILM COATED ORAL at 07:09

## 2018-01-01 RX ADMIN — CLONIDINE HYDROCHLORIDE 0.2 MG: 0.1 TABLET ORAL at 07:12

## 2018-01-01 RX ADMIN — HEPARIN SODIUM 5000 UNITS: 5000 INJECTION, SOLUTION INTRAVENOUS; SUBCUTANEOUS at 01:09

## 2018-01-01 RX ADMIN — NICARDIPINE HYDROCHLORIDE 2.5 MG/HR: 0.2 INJECTION, SOLUTION INTRAVENOUS at 08:09

## 2018-01-01 RX ADMIN — ASPIRIN 81 MG CHEWABLE TABLET 81 MG: 81 TABLET CHEWABLE at 08:09

## 2018-01-01 RX ADMIN — TACROLIMUS 6 MG: 5 CAPSULE ORAL at 05:09

## 2018-01-01 RX ADMIN — NIFEDIPINE 120 MG: 30 TABLET, FILM COATED, EXTENDED RELEASE ORAL at 07:10

## 2018-01-01 RX ADMIN — SODIUM CHLORIDE: 0.9 INJECTION, SOLUTION INTRAVENOUS at 02:10

## 2018-01-01 RX ADMIN — CLONIDINE HYDROCHLORIDE 0.2 MG: 0.1 TABLET ORAL at 11:09

## 2018-01-01 RX ADMIN — TACROLIMUS 6 MG: 5 CAPSULE ORAL at 08:09

## 2018-01-01 RX ADMIN — HYDRALAZINE HYDROCHLORIDE 100 MG: 25 TABLET ORAL at 06:12

## 2018-01-01 RX ADMIN — LABETALOL HCL 500 MG: 300 TABLET, FILM COATED ORAL at 09:09

## 2018-01-01 RX ADMIN — SODIUM CHLORIDE 300 ML: 0.9 INJECTION, SOLUTION INTRAVENOUS at 03:09

## 2018-01-01 RX ADMIN — HYDRALAZINE HYDROCHLORIDE 10 MG: 20 INJECTION INTRAMUSCULAR; INTRAVENOUS at 02:10

## 2018-01-01 RX ADMIN — TACROLIMUS 6 MG: 5 CAPSULE ORAL at 06:10

## 2018-01-01 RX ADMIN — NICARDIPINE HYDROCHLORIDE 1.25 MG/HR: 0.2 INJECTION, SOLUTION INTRAVENOUS at 07:10

## 2018-01-01 RX ADMIN — HYDRALAZINE HYDROCHLORIDE 100 MG: 50 TABLET ORAL at 09:09

## 2018-01-01 RX ADMIN — CLONIDINE HYDROCHLORIDE 0.3 MG: 0.3 TABLET ORAL at 04:09

## 2018-01-01 RX ADMIN — CARVEDILOL 25 MG: 6.25 TABLET, FILM COATED ORAL at 06:12

## 2018-01-01 RX ADMIN — TACROLIMUS 6 MG: 5 CAPSULE ORAL at 07:09

## 2018-01-01 RX ADMIN — MIDAZOLAM HYDROCHLORIDE 0.5 MG: 1 INJECTION, SOLUTION INTRAMUSCULAR; INTRAVENOUS at 02:11

## 2018-01-01 RX ADMIN — CITALOPRAM HYDROBROMIDE 20 MG: 20 TABLET ORAL at 09:10

## 2018-01-01 RX ADMIN — BUTALBITAL, ACETAMINOPHEN AND CAFFEINE 1 TABLET: 50; 325; 40 TABLET ORAL at 03:12

## 2018-01-01 RX ADMIN — LABETALOL HCL 500 MG: 300 TABLET, FILM COATED ORAL at 08:10

## 2018-01-01 RX ADMIN — CLONIDINE HYDROCHLORIDE 0.3 MG: 0.1 TABLET ORAL at 02:12

## 2018-01-01 RX ADMIN — LABETALOL HYDROCHLORIDE 5 MG: 5 INJECTION INTRAVENOUS at 10:09

## 2018-01-01 RX ADMIN — IRBESARTAN 300 MG: 300 TABLET ORAL at 09:09

## 2018-01-01 RX ADMIN — MINOXIDIL 5 MG: 2.5 TABLET ORAL at 07:10

## 2018-01-01 RX ADMIN — MIDAZOLAM HYDROCHLORIDE 1 MG: 1 INJECTION, SOLUTION INTRAMUSCULAR; INTRAVENOUS at 01:11

## 2018-01-01 RX ADMIN — CITALOPRAM HYDROBROMIDE 20 MG: 20 TABLET ORAL at 07:10

## 2018-01-01 RX ADMIN — CARVEDILOL 25 MG: 25 TABLET, FILM COATED ORAL at 09:10

## 2018-01-01 RX ADMIN — CINACALCET HYDROCHLORIDE 30 MG: 30 TABLET, COATED ORAL at 09:09

## 2018-01-01 RX ADMIN — HEPARIN SODIUM 5000 UNITS: 5000 INJECTION, SOLUTION INTRAVENOUS; SUBCUTANEOUS at 05:09

## 2018-01-01 RX ADMIN — IOHEXOL 35 ML: 300 INJECTION, SOLUTION INTRAVENOUS at 02:11

## 2018-01-01 RX ADMIN — CITALOPRAM HYDROBROMIDE 20 MG: 20 TABLET ORAL at 08:10

## 2018-01-01 RX ADMIN — TACROLIMUS 1 MG: 5 CAPSULE ORAL at 09:10

## 2018-01-01 RX ADMIN — NICARDIPINE HYDROCHLORIDE 2.5 MG/HR: 0.2 INJECTION, SOLUTION INTRAVENOUS at 03:10

## 2018-01-01 RX ADMIN — TACROLIMUS 6 MG: 5 CAPSULE ORAL at 08:10

## 2018-01-01 RX ADMIN — MINOXIDIL 5 MG: 2.5 TABLET ORAL at 09:10

## 2018-01-01 RX ADMIN — NICARDIPINE HYDROCHLORIDE 2.5 MG/HR: 0.2 INJECTION, SOLUTION INTRAVENOUS at 06:10

## 2018-01-01 RX ADMIN — NICARDIPINE HYDROCHLORIDE 5 MG/HR: 0.2 INJECTION, SOLUTION INTRAVENOUS at 03:10

## 2018-01-01 RX ADMIN — NICARDIPINE HYDROCHLORIDE 2.5 MG/HR: 0.2 INJECTION, SOLUTION INTRAVENOUS at 09:09

## 2018-01-01 RX ADMIN — HEPARIN SODIUM 5000 UNITS: 5000 INJECTION, SOLUTION INTRAVENOUS; SUBCUTANEOUS at 09:09

## 2018-01-01 RX ADMIN — LABETALOL HYDROCHLORIDE 10 MG: 5 INJECTION INTRAVENOUS at 02:09

## 2018-01-01 RX ADMIN — FENTANYL CITRATE 25 MCG: 50 INJECTION, SOLUTION INTRAMUSCULAR; INTRAVENOUS at 02:11

## 2018-01-01 RX ADMIN — IRBESARTAN 300 MG: 300 TABLET ORAL at 08:09

## 2018-01-01 RX ADMIN — CARVEDILOL 25 MG: 25 TABLET, FILM COATED ORAL at 08:10

## 2018-01-01 RX ADMIN — HYDRALAZINE HYDROCHLORIDE 100 MG: 50 TABLET ORAL at 07:09

## 2018-01-01 RX ADMIN — ACETAMINOPHEN 1000 MG: 500 TABLET, FILM COATED ORAL at 02:12

## 2018-01-01 RX ADMIN — HYDRALAZINE HYDROCHLORIDE 100 MG: 50 TABLET ORAL at 08:10

## 2018-01-01 RX ADMIN — IRBESARTAN 300 MG: 150 TABLET ORAL at 08:10

## 2018-01-01 RX ADMIN — CLONIDINE HYDROCHLORIDE 0.1 MG: 0.1 TABLET ORAL at 09:12

## 2018-01-01 RX ADMIN — TACROLIMUS 6 MG: 5 CAPSULE ORAL at 10:10

## 2018-01-01 RX ADMIN — HYDRALAZINE HYDROCHLORIDE 100 MG: 50 TABLET ORAL at 10:10

## 2018-01-01 RX ADMIN — TACROLIMUS 1 MG: 1 CAPSULE ORAL at 08:10

## 2018-01-01 RX ADMIN — BUTALBITAL, ACETAMINOPHEN AND CAFFEINE 1 TABLET: 50; 325; 40 TABLET ORAL at 04:10

## 2018-01-01 RX ADMIN — BUTALBITAL, ACETAMINOPHEN AND CAFFEINE 1 TABLET: 50; 325; 40 TABLET ORAL at 01:10

## 2018-01-01 RX ADMIN — NIFEDIPINE 120 MG: 30 TABLET, FILM COATED, EXTENDED RELEASE ORAL at 09:10

## 2018-01-01 RX ADMIN — CARVEDILOL 25 MG: 25 TABLET, FILM COATED ORAL at 10:10

## 2018-01-01 RX ADMIN — LABETALOL HCL 500 MG: 300 TABLET, FILM COATED ORAL at 08:09

## 2018-01-01 RX ADMIN — BUTALBITAL, ACETAMINOPHEN AND CAFFEINE 2 TABLET: 50; 325; 40 TABLET ORAL at 12:09

## 2018-01-01 RX ADMIN — NICARDIPINE HYDROCHLORIDE 10 MG/HR: 0.2 INJECTION, SOLUTION INTRAVENOUS at 03:10

## 2018-01-01 RX ADMIN — NICARDIPINE HYDROCHLORIDE 2.5 MG/HR: 0.2 INJECTION, SOLUTION INTRAVENOUS at 01:10

## 2018-01-01 RX ADMIN — IRBESARTAN 300 MG: 150 TABLET ORAL at 09:10

## 2018-01-01 RX ADMIN — ASPIRIN 81 MG CHEWABLE TABLET 81 MG: 81 TABLET CHEWABLE at 08:10

## 2018-01-01 RX ADMIN — PREDNISONE 1 MG: 1 TABLET ORAL at 09:09

## 2018-01-01 RX ADMIN — NICARDIPINE HYDROCHLORIDE 7.5 MG/HR: 0.2 INJECTION, SOLUTION INTRAVENOUS at 03:10

## 2018-01-01 RX ADMIN — MINOXIDIL 5 MG: 2.5 TABLET ORAL at 08:10

## 2018-01-01 RX ADMIN — CINACALCET HYDROCHLORIDE 30 MG: 30 TABLET, COATED ORAL at 08:09

## 2018-01-01 RX ADMIN — BUTALBITAL, ACETAMINOPHEN AND CAFFEINE 2 TABLET: 50; 325; 40 TABLET ORAL at 08:10

## 2018-01-01 RX ADMIN — ASPIRIN 81 MG CHEWABLE TABLET 81 MG: 81 TABLET CHEWABLE at 07:10

## 2018-01-01 RX ADMIN — ASPIRIN 81 MG CHEWABLE TABLET 81 MG: 81 TABLET CHEWABLE at 09:10

## 2018-01-01 RX ADMIN — FAMOTIDINE 20 MG: 20 TABLET ORAL at 08:10

## 2018-01-01 RX ADMIN — LEVETIRACETAM 1000 MG: 10 INJECTION INTRAVENOUS at 10:11

## 2018-01-01 RX ADMIN — HYDROXYZINE HYDROCHLORIDE 25 MG: 25 TABLET, FILM COATED ORAL at 02:09

## 2018-01-02 NOTE — PROGRESS NOTES
Subjective:       Patient ID: Holly Patel is a 27 y.o. Black or  female who presents for follow-up evaluation of ESRD    HPI    Ms. Patel is a 27 year old woman with past medical history of liver transplant (1992 for hemangioendothelioma), malignant hypertension presenting for follow up of ESRD on home hemo ([facility]xStage).  Patient initiated on hemodialysis October 2015, trained on home hemodialysis February 2016, performing at home without difficulty (with main assistance from her sister).  Patient recently seen by Liver Transplant, expressed concern for med adherence, has follow up scheduled.  Patient reports blood pressures have been variable.  She otherwise denies any fever, chest pain, shortness of breath, abdominal pain, diarrhea, dysuria/hematuria.    Review of Systems   Constitutional: Negative for appetite change, fatigue and fever.   Respiratory: Negative for chest tightness and shortness of breath.    Cardiovascular: Negative for chest pain and leg swelling.   Gastrointestinal: Negative for abdominal pain, constipation, diarrhea, nausea and vomiting.   Genitourinary: Negative for difficulty urinating, dysuria, flank pain, frequency, hematuria and urgency.   Musculoskeletal: Negative for arthralgias, joint swelling and myalgias.   Skin: Negative for rash and wound.   Neurological: Negative for dizziness, weakness and light-headedness.   All other systems reviewed and are negative.      Objective:      Physical Exam   Constitutional: She appears well-developed and well-nourished.   Cardiovascular: Normal rate, regular rhythm and normal heart sounds.  Exam reveals no gallop and no friction rub.    No murmur heard.  Pulmonary/Chest: Effort normal and breath sounds normal. No respiratory distress. She has no wheezes. She has no rales.   Abdominal: Soft. Bowel sounds are normal. There is no tenderness.   Musculoskeletal: She exhibits no edema.   Neurological: She is alert.   Skin: Skin is  warm and dry. No rash noted. No erythema.   Psychiatric: She has a normal mood and affect.   Vitals reviewed.    Access: L AVF w/ good thrill/bruit    Non-OCF labs Dec  eKt/V 2.3  H/H 8.9/28.2  Ca/Phos 8.8/6.5  PTH 4068  Alb 3.2    Assessment:       1. ESRD (end stage renal disease)    2. Hypertensive kidney disease with end-stage renal disease    3. Liver replaced by transplant    4. Hyperparathyroidism, secondary renal    5. Anemia of chronic renal failure, stage 5    6. Liver transplanted        Plan:     Ms. Patel is a 27 year old woman with past medical history of liver transplant (1992 for hemangioendothelioma), malignant hypertension presenting for follow up of ESRD on home hemo (NxStage).  Patient initiated on hemodialysis October 2015, trained on home hemodialysis February 2016, now performing at home without difficulty (with main assistance from her sister).  Kt/V previously above goal, reduced treatments to 4x/wk, now at goal, will continue to monitor clearance.  Patient followed by Transplant for possible kidney transplantation, h/o liver transplant, recently discussed med adherence with Liver Transplant team.  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  - Hypertension: BP labile, will give trial of clonidine patch, stressed adherence to med regimen  - Anemia: Hgb previously at goal, improving, continue erythropoetin therapy  - Bone/mineral metabolism: patient with secondary hyperparathyroidism, discussed low phosphorous diet; previously increased calcitriol, cinacalcet (had not received previously due to insurance/pharmacy), stressed adherence to med regimen, patient voiced understanding; will refer to surgery for parathyroidectomy    Return to clinic monthly

## 2018-01-03 ENCOUNTER — HOSPITAL ENCOUNTER (INPATIENT)
Facility: HOSPITAL | Age: 28
LOS: 2 days | Discharge: HOME | DRG: 689 | End: 2018-01-05
Attending: EMERGENCY MEDICINE | Admitting: HOSPITALIST
Payer: MEDICARE

## 2018-01-03 DIAGNOSIS — N18.6 ESRD ON HEMODIALYSIS: Chronic | ICD-10-CM

## 2018-01-03 DIAGNOSIS — N39.0 URINARY TRACT INFECTION WITHOUT HEMATURIA, SITE UNSPECIFIED: Primary | ICD-10-CM

## 2018-01-03 DIAGNOSIS — Z94.4 LIVER TRANSPLANT RECIPIENT: ICD-10-CM

## 2018-01-03 DIAGNOSIS — N39.0 URINARY TRACT INFECTION WITHOUT HEMATURIA: ICD-10-CM

## 2018-01-03 DIAGNOSIS — N18.6 ESRD (END STAGE RENAL DISEASE): ICD-10-CM

## 2018-01-03 DIAGNOSIS — Z99.2 ESRD ON HEMODIALYSIS: Chronic | ICD-10-CM

## 2018-01-03 DIAGNOSIS — Z94.4 LIVER REPLACED BY TRANSPLANT: Chronic | ICD-10-CM

## 2018-01-03 LAB
ALBUMIN SERPL BCP-MCNC: 2.8 G/DL
ALP SERPL-CCNC: 599 U/L
ALT SERPL W/O P-5'-P-CCNC: 47 U/L
ANION GAP SERPL CALC-SCNC: 15 MMOL/L
AST SERPL-CCNC: 44 U/L
B-HCG UR QL: NEGATIVE
BACTERIA #/AREA URNS AUTO: ABNORMAL /HPF
BASOPHILS # BLD AUTO: 0.01 K/UL
BASOPHILS NFR BLD: 0.2 %
BILIRUB SERPL-MCNC: 0.6 MG/DL
BILIRUB UR QL STRIP: NEGATIVE
BUN SERPL-MCNC: 84 MG/DL
CALCIUM SERPL-MCNC: 8.1 MG/DL
CHLORIDE SERPL-SCNC: 105 MMOL/L
CLARITY UR REFRACT.AUTO: ABNORMAL
CO2 SERPL-SCNC: 20 MMOL/L
COLOR UR AUTO: YELLOW
CREAT SERPL-MCNC: 17.5 MG/DL
CTP QC/QA: YES
DIFFERENTIAL METHOD: ABNORMAL
EOSINOPHIL # BLD AUTO: 0.1 K/UL
EOSINOPHIL NFR BLD: 3.2 %
ERYTHROCYTE [DISTWIDTH] IN BLOOD BY AUTOMATED COUNT: 15.4 %
EST. GFR  (AFRICAN AMERICAN): 2.8 ML/MIN/1.73 M^2
EST. GFR  (NON AFRICAN AMERICAN): 2.4 ML/MIN/1.73 M^2
GLUCOSE SERPL-MCNC: 93 MG/DL
GLUCOSE UR QL STRIP: ABNORMAL
HCT VFR BLD AUTO: 27.7 %
HGB BLD-MCNC: 8.9 G/DL
HGB UR QL STRIP: ABNORMAL
HYALINE CASTS UR QL AUTO: 0 /LPF
IMM GRANULOCYTES # BLD AUTO: 0.04 K/UL
IMM GRANULOCYTES NFR BLD AUTO: 0.9 %
KETONES UR QL STRIP: NEGATIVE
LEUKOCYTE ESTERASE UR QL STRIP: ABNORMAL
LIPASE SERPL-CCNC: 54 U/L
LYMPHOCYTES # BLD AUTO: 0.5 K/UL
LYMPHOCYTES NFR BLD: 12 %
MCH RBC QN AUTO: 25.6 PG
MCHC RBC AUTO-ENTMCNC: 32.1 G/DL
MCV RBC AUTO: 80 FL
MICROSCOPIC COMMENT: ABNORMAL
MONOCYTES # BLD AUTO: 0.1 K/UL
MONOCYTES NFR BLD: 2.7 %
NEUTROPHILS # BLD AUTO: 3.6 K/UL
NEUTROPHILS NFR BLD: 81 %
NITRITE UR QL STRIP: NEGATIVE
NRBC BLD-RTO: 0 /100 WBC
PH UR STRIP: 7 [PH] (ref 5–8)
PLATELET # BLD AUTO: 69 K/UL
PMV BLD AUTO: ABNORMAL FL
POTASSIUM SERPL-SCNC: 4.9 MMOL/L
PROT SERPL-MCNC: 7.5 G/DL
PROT UR QL STRIP: ABNORMAL
RBC # BLD AUTO: 3.48 M/UL
RBC #/AREA URNS AUTO: 13 /HPF (ref 0–4)
SODIUM SERPL-SCNC: 140 MMOL/L
SP GR UR STRIP: 1.01 (ref 1–1.03)
SQUAMOUS #/AREA URNS AUTO: 34 /HPF
URN SPEC COLLECT METH UR: ABNORMAL
UROBILINOGEN UR STRIP-ACNC: NEGATIVE EU/DL
WBC # BLD AUTO: 4.41 K/UL
WBC #/AREA URNS AUTO: >100 /HPF (ref 0–5)
WBC CLUMPS UR QL AUTO: ABNORMAL

## 2018-01-03 PROCEDURE — 96374 THER/PROPH/DIAG INJ IV PUSH: CPT

## 2018-01-03 PROCEDURE — 96375 TX/PRO/DX INJ NEW DRUG ADDON: CPT

## 2018-01-03 PROCEDURE — 99285 EMERGENCY DEPT VISIT HI MDM: CPT | Mod: 25

## 2018-01-03 PROCEDURE — 25000003 PHARM REV CODE 250: Performed by: HOSPITALIST

## 2018-01-03 PROCEDURE — 20600001 HC STEP DOWN PRIVATE ROOM

## 2018-01-03 PROCEDURE — 81025 URINE PREGNANCY TEST: CPT | Performed by: EMERGENCY MEDICINE

## 2018-01-03 PROCEDURE — 83690 ASSAY OF LIPASE: CPT

## 2018-01-03 PROCEDURE — 63600175 PHARM REV CODE 636 W HCPCS: Performed by: EMERGENCY MEDICINE

## 2018-01-03 PROCEDURE — 63600175 PHARM REV CODE 636 W HCPCS: Performed by: HOSPITALIST

## 2018-01-03 PROCEDURE — 81001 URINALYSIS AUTO W/SCOPE: CPT

## 2018-01-03 PROCEDURE — 80053 COMPREHEN METABOLIC PANEL: CPT

## 2018-01-03 PROCEDURE — 99223 1ST HOSP IP/OBS HIGH 75: CPT | Mod: ,,, | Performed by: HOSPITALIST

## 2018-01-03 PROCEDURE — 85025 COMPLETE CBC W/AUTO DIFF WBC: CPT

## 2018-01-03 RX ORDER — IBUPROFEN 200 MG
24 TABLET ORAL
Status: DISCONTINUED | OUTPATIENT
Start: 2018-01-03 | End: 2018-01-05 | Stop reason: HOSPADM

## 2018-01-03 RX ORDER — ONDANSETRON 2 MG/ML
4 INJECTION INTRAMUSCULAR; INTRAVENOUS
Status: COMPLETED | OUTPATIENT
Start: 2018-01-03 | End: 2018-01-03

## 2018-01-03 RX ORDER — HYDRALAZINE HYDROCHLORIDE 25 MG/1
50 TABLET, FILM COATED ORAL EVERY 8 HOURS
Status: DISCONTINUED | OUTPATIENT
Start: 2018-01-03 | End: 2018-01-04

## 2018-01-03 RX ORDER — GLUCAGON 1 MG
1 KIT INJECTION
Status: DISCONTINUED | OUTPATIENT
Start: 2018-01-03 | End: 2018-01-05 | Stop reason: HOSPADM

## 2018-01-03 RX ORDER — HYDROMORPHONE HYDROCHLORIDE 2 MG/ML
0.5 INJECTION, SOLUTION INTRAMUSCULAR; INTRAVENOUS; SUBCUTANEOUS
Status: COMPLETED | OUTPATIENT
Start: 2018-01-03 | End: 2018-01-03

## 2018-01-03 RX ORDER — CLONIDINE HYDROCHLORIDE 0.1 MG/1
0.1 TABLET ORAL 2 TIMES DAILY
Status: DISCONTINUED | OUTPATIENT
Start: 2018-01-03 | End: 2018-01-05

## 2018-01-03 RX ORDER — ACETAMINOPHEN 325 MG/1
650 TABLET ORAL EVERY 4 HOURS PRN
Status: DISCONTINUED | OUTPATIENT
Start: 2018-01-03 | End: 2018-01-05 | Stop reason: HOSPADM

## 2018-01-03 RX ORDER — PREDNISONE 1 MG/1
1 TABLET ORAL EVERY OTHER DAY
Status: DISCONTINUED | OUTPATIENT
Start: 2018-01-04 | End: 2018-01-05 | Stop reason: HOSPADM

## 2018-01-03 RX ORDER — TACROLIMUS 5 MG/1
5 CAPSULE ORAL 2 TIMES DAILY
Status: DISCONTINUED | OUTPATIENT
Start: 2018-01-03 | End: 2018-01-05 | Stop reason: HOSPADM

## 2018-01-03 RX ORDER — AMLODIPINE BESYLATE 10 MG/1
10 TABLET ORAL DAILY
Status: DISCONTINUED | OUTPATIENT
Start: 2018-01-04 | End: 2018-01-03

## 2018-01-03 RX ORDER — ONDANSETRON 2 MG/ML
4 INJECTION INTRAMUSCULAR; INTRAVENOUS
Status: DISCONTINUED | OUTPATIENT
Start: 2018-01-03 | End: 2018-01-03

## 2018-01-03 RX ORDER — SODIUM CHLORIDE 0.9 % (FLUSH) 0.9 %
5 SYRINGE (ML) INJECTION
Status: DISCONTINUED | OUTPATIENT
Start: 2018-01-03 | End: 2018-01-05 | Stop reason: HOSPADM

## 2018-01-03 RX ORDER — CEFTRIAXONE 1 G/1
1 INJECTION, POWDER, FOR SOLUTION INTRAMUSCULAR; INTRAVENOUS
Status: COMPLETED | OUTPATIENT
Start: 2018-01-03 | End: 2018-01-03

## 2018-01-03 RX ORDER — IBUPROFEN 200 MG
16 TABLET ORAL
Status: DISCONTINUED | OUTPATIENT
Start: 2018-01-03 | End: 2018-01-05 | Stop reason: HOSPADM

## 2018-01-03 RX ORDER — LABETALOL 300 MG/1
600 TABLET, FILM COATED ORAL EVERY 12 HOURS
Status: DISCONTINUED | OUTPATIENT
Start: 2018-01-03 | End: 2018-01-04

## 2018-01-03 RX ORDER — HYDRALAZINE HYDROCHLORIDE 20 MG/ML
10 INJECTION INTRAMUSCULAR; INTRAVENOUS
Status: COMPLETED | OUTPATIENT
Start: 2018-01-03 | End: 2018-01-03

## 2018-01-03 RX ORDER — CINACALCET 30 MG/1
90 TABLET, FILM COATED ORAL
Status: DISCONTINUED | OUTPATIENT
Start: 2018-01-04 | End: 2018-01-05 | Stop reason: HOSPADM

## 2018-01-03 RX ORDER — ONDANSETRON 8 MG/1
8 TABLET, ORALLY DISINTEGRATING ORAL EVERY 8 HOURS PRN
Status: DISCONTINUED | OUTPATIENT
Start: 2018-01-03 | End: 2018-01-05 | Stop reason: HOSPADM

## 2018-01-03 RX ADMIN — LABETALOL HCL 600 MG: 300 TABLET, FILM COATED ORAL at 09:01

## 2018-01-03 RX ADMIN — ONDANSETRON 4 MG: 2 INJECTION INTRAMUSCULAR; INTRAVENOUS at 05:01

## 2018-01-03 RX ADMIN — CLONIDINE HYDROCHLORIDE 0.1 MG: 0.1 TABLET ORAL at 09:01

## 2018-01-03 RX ADMIN — TACROLIMUS 5 MG: 5 CAPSULE ORAL at 09:01

## 2018-01-03 RX ADMIN — CEFTRIAXONE SODIUM 1 G: 1 INJECTION, POWDER, FOR SOLUTION INTRAMUSCULAR; INTRAVENOUS at 07:01

## 2018-01-03 RX ADMIN — HYDROMORPHONE HYDROCHLORIDE 0.5 MG: 2 INJECTION INTRAMUSCULAR; INTRAVENOUS; SUBCUTANEOUS at 06:01

## 2018-01-03 RX ADMIN — NIFEDIPINE 90 MG: 60 TABLET, FILM COATED, EXTENDED RELEASE ORAL at 09:01

## 2018-01-03 RX ADMIN — HYDRALAZINE HYDROCHLORIDE 10 MG: 20 INJECTION INTRAMUSCULAR; INTRAVENOUS at 07:01

## 2018-01-03 NOTE — ED NOTES
Update to physician regarding IVF, dialysis patient,M,W,F. States she missed dialysis today. Physician  states to give 500 ml IVF.

## 2018-01-03 NOTE — ED PROVIDER NOTES
Encounter Date: 1/3/2018    SCRIBE #1 NOTE: I, Desiree Pittman, am scribing for, and in the presence of, Dr. Hamiltno.       History     Chief Complaint   Patient presents with    Abdominal Pain     nvd, liver xplant and on dialyis     Time seen by provider: 4:27 PM    This is a 27 y.o. Female with a history of a liver transplant ESRD on dialyisis, renovascular HTN, splenomegaly, MRSA bacteremia, chronic rejection of liver transplant, immunosuppressed, secondary hyperparathyroidism, and thrombocytoenia who presents with complaint of lower quadrant abdominal pain and swelling for approximately 7 days. She indicates associated nausea, vomiting, and diarrhea. She denies dysuria or any urinary symptoms.       The history is provided by the patient.     Review of patient's allergies indicates:   Allergen Reactions    Chloral hydrate      Other reaction(s): Hallucinations  Other reaction(s): Hives    Hydrocodone Other (See Comments)     Mental status changes     Past Medical History:   Diagnosis Date    Anemia in ESRD (end-stage renal disease) 10/12/2015    Chronic rejection of liver transplant 3/22/2016    ESRD on hemodialysis 2015    History of splenomegaly 2016    Immunosuppressed 2017    Iron deficiency anemia secondary to inadequate dietary iron intake 2017    [facility].   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 ()    Moderate protein-calorie malnutrition 2017    MRSA bacteremia 2017    Prophylactic immunotherapy 2014    Renovascular hypertension 10/2/2015    Secondary hyperparathyroidism 2017    Thrombocytopenia 2016     Past Surgical History:   Procedure Laterality Date     SECTION      2     KIDNEY TRANSPLANT      LIVER BIOPSY      LIVER TRANSPLANT  1992    TUBAL LIGATION       Family History   Problem Relation Age of Onset    Hypertension Mother     Hypertension  Father     Melanoma Neg Hx      Social History   Substance Use Topics    Smoking status: Never Smoker    Smokeless tobacco: Never Used    Alcohol use No     Review of Systems   Constitutional: Negative for fever.   HENT: Negative for sore throat.    Eyes: Negative for visual disturbance.   Respiratory: Negative for shortness of breath.    Cardiovascular: Negative for chest pain.   Gastrointestinal: Positive for abdominal distention, abdominal pain (Lower quadrant), diarrhea, nausea and vomiting.   Genitourinary: Negative for dysuria.        Denies urinary symptoms.   Musculoskeletal: Negative for back pain.   Skin: Negative for pallor.   Neurological: Negative for headaches.       Physical Exam     Initial Vitals [01/03/18 1349]   BP Pulse Resp Temp SpO2   (!) 215/140 100 18 98.2 °F (36.8 °C) 99 %      MAP       165         Physical Exam    Nursing note and vitals reviewed.  Constitutional: She appears well-developed and well-nourished.   HENT:   Head: Normocephalic and atraumatic.   Mouth/Throat: Oropharynx is clear and moist.   Eyes: Conjunctivae and EOM are normal. Pupils are equal, round, and reactive to light.   Neck: Normal range of motion. Neck supple. No tracheal deviation present. No JVD present.   Cardiovascular: Normal rate, regular rhythm, normal heart sounds and intact distal pulses. Exam reveals no gallop and no friction rub.    No murmur heard.  Pulmonary/Chest: Breath sounds normal. No stridor. No respiratory distress. She has no wheezes. She has no rhonchi. She has no rales. She exhibits no tenderness.   Abdominal: Soft. Bowel sounds are normal. She exhibits no distension and no mass. There is tenderness (Mild, diffuse, lower ). There is no rebound and no guarding.   Musculoskeletal: Normal range of motion. She exhibits no edema or tenderness.   Neurological: She is alert and oriented to person, place, and time. She has normal strength and normal reflexes. She displays normal reflexes. No  cranial nerve deficit or sensory deficit.   Skin: Skin is warm and dry. Capillary refill takes less than 2 seconds. No rash and no abscess noted. No erythema. No pallor.   Psychiatric: She has a normal mood and affect. Her behavior is normal. Judgment and thought content normal.         ED Course   Procedures  Labs Reviewed   CBC W/ AUTO DIFFERENTIAL - Abnormal; Notable for the following:        Result Value    RBC 3.48 (*)     Hemoglobin 8.9 (*)     Hematocrit 27.7 (*)     MCV 80 (*)     MCH 25.6 (*)     RDW 15.4 (*)     Platelets 69 (*)     Immature Granulocytes 0.9 (*)     Lymph # 0.5 (*)     Mono # 0.1 (*)     Gran% 81.0 (*)     Lymph% 12.0 (*)     Mono% 2.7 (*)     All other components within normal limits   COMPREHENSIVE METABOLIC PANEL - Abnormal; Notable for the following:     CO2 20 (*)     BUN, Bld 84 (*)     Creatinine 17.5 (*)     Calcium 8.1 (*)     Albumin 2.8 (*)     Alkaline Phosphatase 599 (*)     AST 44 (*)     ALT 47 (*)     eGFR if  2.8 (*)     eGFR if non  2.4 (*)     All other components within normal limits   URINALYSIS - Abnormal; Notable for the following:     Appearance, UA Cloudy (*)     Protein, UA 3+ (*)     Glucose, UA 1+ (*)     Occult Blood UA 1+ (*)     Leukocytes, UA 3+ (*)     All other components within normal limits    Narrative:     YELLOW & GRAY TUBES   URINALYSIS MICROSCOPIC - Abnormal; Notable for the following:     RBC, UA 13 (*)     WBC, UA >100 (*)     WBC Clumps, UA Few (*)     Bacteria, UA Many (*)     All other components within normal limits    Narrative:     YELLOW & GRAY TUBES   LIPASE   POCT URINE PREGNANCY        Imaging Results          CT Abdomen Pelvis  Without Contrast (Preliminary result)  Result time 01/03/18 19:09:00    Preliminary result by Daksha Funes MD (01/03/18 19:09:00)                 Impression:        1. No cause for this patient's abdominal pain is identified.    2.  Findings compatible with portal  hypertension including splenomegaly and moderate volume ascites.    3.  Multilevel endplate lucencies throughout the visualized spine on a background of diffuse osseous sclerosis.  These are favored to represent large Schmorl's nodes in the setting of renal osteodystrophy, continued clinical followup is recommended.    4.  Additional findings include:  -Patchy bibasilar groundglass opacification which may relate with infectious or noninfectious inflammatory change  -0.4 cm left upper lobe and partially visualized 0.7 cm right upper lobe pulmonary nodule.  Per Fleischner Society 2017 guidelines; in a low risk patient,  CT chest 6-12 months with followup CT chest in 18-24 months.  In a high risk patient  history of cancer/ smoker, CT chest 6-12 months with followup 18- 24 month CT chest to evaluate for stability or change.   -Small right pleural effusion  -Postsurgical change of orthotopic liver transplant  -Significant renal atrophy, compatible with this patient's history of end-stage renal disease  ______________________________________     Electronically signed by resident: DALLIN ONOFRE MD  Date:     01/03/18  Time:    19:09            As the supervising and teaching physician, I personally reviewed the images and resident's interpretation and I agree with the findings.               Narrative:    Procedure comments: The patient was surveyed from the lung bases through the pelvis without the administration of IV or oral contrast and data was reconstructed for coronal, sagittal, and axial images.    Comparison: Liver transplant ultrasound 6/12/2017, CTA abdomen pelvis 10/11/2015.    Findings:    The lung bases show patchy groundglass opacification which may correlate with infectious or noninfectious inflammatory change.  A partially visualized 0.7 cm right middle lobe pulmonary nodule is present.  A stable 0.4 cm left upper lobe pulmonary nodule is also seen.  There is small volume right pleural fluid.  The  visualized portions of the heart appear normal.    The patient is status post liver transplant, the liver is normal in size and attenuation with no focal hepatic abnormality identified on this noncontrast examination.  The gallbladder is surgically absent.  There is no intra-or extrahepatic biliary ductal dilatation.  There is moderate volume ascites.    The stomach, pancreas, and adrenal glands are unremarkable.  The spleen is significantly enlarged.    The kidneys are significantly atrophic, compatible with this patient's history of end-stage renal disease..  There is no evidence of hydronephrosis.  The visualized ureters appear normal in course and caliber without evidence of ureteral dilatation. The urinary bladder, uterus, and ovaries demonstrate no significant abnormality.    The abdominal aorta is normal in course and caliber without significant atherosclerotic calcifications.    The visualized loops of small and large bowel show no evidence of obstruction or inflammation.  There is no intraperitoneal free air. There is no evidence of lymph node enlargement in the abdomen or pelvis.    When viewed with bone windows the osseous structures demonstrate diffuse sclerosis, compatible with renal osteodystrophy.  Throughout the visualized spine, there are prominent lucencies adjacent to the endplates at T9, T10, T11, L1, L2, L5, and S1.  While these lesions are new since prior CT 10/2015 and lesions at L1 and L2 are large in size, all are favored to represent large Schmorl's nodes in the setting of renal osteodystrophy.      The extraperitoneal soft tissues are unremarkable.                                 Medical Decision Making:   History:   Old Medical Records: I decided to obtain old medical records.  Clinical Tests:   Lab Tests: Ordered and Reviewed  Radiological Study: Reviewed and Ordered  ED Management:  The patient's CBC and chemistry are consistent with patient's baseline anemia and end-stage renal  disease.  Patient's urinalysis revealed a signs of significant urinary tract infection, which is likely causing her symptoms.  The patient's abdominal CT was negative for any acute intra-abdominal pathology, the patient reported improved symptoms after receiving measurements pain meds and a small bolus of fluid.  At this point patient is being admitted to the hospital for further treatment, suspect, and evaluation            Scribe Attestation:   Scribe #1: I performed the above scribed service and the documentation accurately describes the services I performed. I attest to the accuracy of the note.    Attending Attestation:           Physician Attestation for Scribe:      Comments: I, Dr. Gregg Hamilton, personally performed the services described in this documentation. All medical record entries made by the scribe were at my direction and in my presence.  I have reviewed the chart and agree that the record reflects my personal performance and is accurate and complete.  Gregg Hamilton MD.  7:31 PM 01/03/2018            ED Course      Clinical Impression:   The primary encounter diagnosis was Urinary tract infection without hematuria, site unspecified. Diagnoses of Liver transplant recipient and ESRD (end stage renal disease) were also pertinent to this visit.    Disposition:   Disposition: Admitted                        Forest Hamilton MD  01/03/18 2822

## 2018-01-03 NOTE — ED NOTES
Patient identifiers verified and correct for Ms Patel  C/C: ABd pain, liver transplant at age 1   APPEARANCE: awake and alert in NAD.  SKIN: warm, dry and intact. No breakdown or bruising.  MUSCULOSKELETAL: Patient moving all extremities spontaneously, no obvious swelling or deformities noted. Ambulates independently.  RESPIRATORY: Denies shortness of breath.Respirations unlabored.   CARDIAC: Denies CP, 2+ distal pulses; no peripheral edema  ABDOMEN: All over abd pain , positive vomiting, positive diarrhea  : voids spontaneously, denies difficulty On dialysis, access PAL  Neurologic: AAO x 4; follows commands equal strength in all extremities; denies numbness/tingling. Denies dizziness  Denies weakness

## 2018-01-04 PROBLEM — N19 UREMIA: Status: ACTIVE | Noted: 2018-01-04

## 2018-01-04 LAB
ALBUMIN SERPL BCP-MCNC: 2.4 G/DL
ALP SERPL-CCNC: 489 U/L
ALT SERPL W/O P-5'-P-CCNC: 36 U/L
ANION GAP SERPL CALC-SCNC: 15 MMOL/L
AST SERPL-CCNC: 29 U/L
BASOPHILS # BLD AUTO: 0.01 K/UL
BASOPHILS NFR BLD: 0.3 %
BILIRUB SERPL-MCNC: 0.5 MG/DL
BUN SERPL-MCNC: 90 MG/DL
CALCIUM SERPL-MCNC: 7.5 MG/DL
CHLORIDE SERPL-SCNC: 103 MMOL/L
CO2 SERPL-SCNC: 17 MMOL/L
CREAT SERPL-MCNC: 17.9 MG/DL
DIFFERENTIAL METHOD: ABNORMAL
EOSINOPHIL # BLD AUTO: 0.1 K/UL
EOSINOPHIL NFR BLD: 3.3 %
ERYTHROCYTE [DISTWIDTH] IN BLOOD BY AUTOMATED COUNT: 15.4 %
EST. GFR  (AFRICAN AMERICAN): 2.7 ML/MIN/1.73 M^2
EST. GFR  (NON AFRICAN AMERICAN): 2.4 ML/MIN/1.73 M^2
FERRITIN SERPL-MCNC: 903 NG/ML
GLUCOSE SERPL-MCNC: 134 MG/DL
HCT VFR BLD AUTO: 23.2 %
HGB BLD-MCNC: 7.6 G/DL
IMM GRANULOCYTES # BLD AUTO: 0.04 K/UL
IMM GRANULOCYTES NFR BLD AUTO: 1 %
INR PPP: 1.1
IRON SERPL-MCNC: 108 UG/DL
LYMPHOCYTES # BLD AUTO: 0.8 K/UL
LYMPHOCYTES NFR BLD: 19.3 %
MAGNESIUM SERPL-MCNC: 1.9 MG/DL
MCH RBC QN AUTO: 25.7 PG
MCHC RBC AUTO-ENTMCNC: 32.8 G/DL
MCV RBC AUTO: 78 FL
MONOCYTES # BLD AUTO: 0.2 K/UL
MONOCYTES NFR BLD: 5.9 %
NEUTROPHILS # BLD AUTO: 2.7 K/UL
NEUTROPHILS NFR BLD: 70.2 %
NRBC BLD-RTO: 0 /100 WBC
PHOSPHATE SERPL-MCNC: 5.2 MG/DL
PLATELET # BLD AUTO: 58 K/UL
PMV BLD AUTO: ABNORMAL FL
POTASSIUM SERPL-SCNC: 4.9 MMOL/L
PROT SERPL-MCNC: 6.4 G/DL
PROTHROMBIN TIME: 11.9 SEC
RBC # BLD AUTO: 2.96 M/UL
SATURATED IRON: 49 %
SODIUM SERPL-SCNC: 135 MMOL/L
TACROLIMUS BLD-MCNC: 5.7 NG/ML
TOTAL IRON BINDING CAPACITY: 221 UG/DL
TRANSFERRIN SERPL-MCNC: 149 MG/DL
WBC # BLD AUTO: 3.89 K/UL

## 2018-01-04 PROCEDURE — 80053 COMPREHEN METABOLIC PANEL: CPT

## 2018-01-04 PROCEDURE — 36415 COLL VENOUS BLD VENIPUNCTURE: CPT

## 2018-01-04 PROCEDURE — 90935 HEMODIALYSIS ONE EVALUATION: CPT

## 2018-01-04 PROCEDURE — 20600001 HC STEP DOWN PRIVATE ROOM

## 2018-01-04 PROCEDURE — 85610 PROTHROMBIN TIME: CPT

## 2018-01-04 PROCEDURE — 99223 1ST HOSP IP/OBS HIGH 75: CPT | Mod: GC,,, | Performed by: INTERNAL MEDICINE

## 2018-01-04 PROCEDURE — 85025 COMPLETE CBC W/AUTO DIFF WBC: CPT

## 2018-01-04 PROCEDURE — 99233 SBSQ HOSP IP/OBS HIGH 50: CPT | Mod: ,,, | Performed by: HOSPITALIST

## 2018-01-04 PROCEDURE — 99223 1ST HOSP IP/OBS HIGH 75: CPT | Mod: ,,, | Performed by: INTERNAL MEDICINE

## 2018-01-04 PROCEDURE — 80197 ASSAY OF TACROLIMUS: CPT

## 2018-01-04 PROCEDURE — 84100 ASSAY OF PHOSPHORUS: CPT

## 2018-01-04 PROCEDURE — 83735 ASSAY OF MAGNESIUM: CPT

## 2018-01-04 PROCEDURE — 63600175 PHARM REV CODE 636 W HCPCS

## 2018-01-04 PROCEDURE — 63600175 PHARM REV CODE 636 W HCPCS: Performed by: HOSPITALIST

## 2018-01-04 PROCEDURE — 25000003 PHARM REV CODE 250: Performed by: HOSPITALIST

## 2018-01-04 PROCEDURE — 83540 ASSAY OF IRON: CPT

## 2018-01-04 PROCEDURE — 82728 ASSAY OF FERRITIN: CPT

## 2018-01-04 RX ORDER — CEFTRIAXONE 1 G/50ML
1 INJECTION, SOLUTION INTRAVENOUS
Status: DISCONTINUED | OUTPATIENT
Start: 2018-01-04 | End: 2018-01-04

## 2018-01-04 RX ORDER — HYDROMORPHONE HYDROCHLORIDE 2 MG/ML
INJECTION, SOLUTION INTRAMUSCULAR; INTRAVENOUS; SUBCUTANEOUS
Status: COMPLETED
Start: 2018-01-04 | End: 2018-01-04

## 2018-01-04 RX ORDER — CEFTRIAXONE 1 G/50ML
1 INJECTION, SOLUTION INTRAVENOUS
Status: DISCONTINUED | OUTPATIENT
Start: 2018-01-04 | End: 2018-01-05 | Stop reason: HOSPADM

## 2018-01-04 RX ORDER — HYDROMORPHONE HYDROCHLORIDE 2 MG/ML
0.5 INJECTION, SOLUTION INTRAMUSCULAR; INTRAVENOUS; SUBCUTANEOUS EVERY 4 HOURS PRN
Status: DISCONTINUED | OUTPATIENT
Start: 2018-01-04 | End: 2018-01-05 | Stop reason: HOSPADM

## 2018-01-04 RX ORDER — CEPHALEXIN 500 MG/1
500 CAPSULE ORAL EVERY 24 HOURS
Status: DISCONTINUED | OUTPATIENT
Start: 2018-01-04 | End: 2018-01-04

## 2018-01-04 RX ORDER — SODIUM CHLORIDE 9 MG/ML
INJECTION, SOLUTION INTRAVENOUS
Status: DISCONTINUED | OUTPATIENT
Start: 2018-01-04 | End: 2018-01-05 | Stop reason: HOSPADM

## 2018-01-04 RX ORDER — HYDRALAZINE HYDROCHLORIDE 25 MG/1
50 TABLET, FILM COATED ORAL EVERY 12 HOURS
Status: DISCONTINUED | OUTPATIENT
Start: 2018-01-04 | End: 2018-01-05

## 2018-01-04 RX ORDER — LABETALOL 300 MG/1
600 TABLET, FILM COATED ORAL EVERY 8 HOURS
Status: DISCONTINUED | OUTPATIENT
Start: 2018-01-04 | End: 2018-01-04

## 2018-01-04 RX ORDER — CEFTRIAXONE 1 G/1
1 INJECTION, POWDER, FOR SOLUTION INTRAMUSCULAR; INTRAVENOUS
Status: DISCONTINUED | OUTPATIENT
Start: 2018-01-04 | End: 2018-01-04

## 2018-01-04 RX ORDER — LABETALOL 300 MG/1
600 TABLET, FILM COATED ORAL EVERY 8 HOURS
Status: DISCONTINUED | OUTPATIENT
Start: 2018-01-04 | End: 2018-01-05 | Stop reason: HOSPADM

## 2018-01-04 RX ORDER — SODIUM CHLORIDE 9 MG/ML
INJECTION, SOLUTION INTRAVENOUS ONCE
Status: COMPLETED | OUTPATIENT
Start: 2018-01-04 | End: 2018-01-04

## 2018-01-04 RX ADMIN — LABETALOL HCL 600 MG: 300 TABLET, FILM COATED ORAL at 10:01

## 2018-01-04 RX ADMIN — NIFEDIPINE 90 MG: 60 TABLET, FILM COATED, EXTENDED RELEASE ORAL at 10:01

## 2018-01-04 RX ADMIN — ACETAMINOPHEN 650 MG: 325 TABLET ORAL at 08:01

## 2018-01-04 RX ADMIN — ONDANSETRON 8 MG: 8 TABLET, ORALLY DISINTEGRATING ORAL at 08:01

## 2018-01-04 RX ADMIN — CINACALCET HYDROCHLORIDE 90 MG: 30 TABLET, COATED ORAL at 10:01

## 2018-01-04 RX ADMIN — CLONIDINE HYDROCHLORIDE 0.1 MG: 0.1 TABLET ORAL at 10:01

## 2018-01-04 RX ADMIN — PREDNISONE 1 MG: 1 TABLET ORAL at 10:01

## 2018-01-04 RX ADMIN — HYDRALAZINE HYDROCHLORIDE 50 MG: 25 TABLET, FILM COATED ORAL at 06:01

## 2018-01-04 RX ADMIN — CEFTRIAXONE SODIUM 1 G: 1 INJECTION, POWDER, FOR SOLUTION INTRAMUSCULAR; INTRAVENOUS at 09:01

## 2018-01-04 RX ADMIN — LABETALOL HCL 600 MG: 300 TABLET, FILM COATED ORAL at 06:01

## 2018-01-04 RX ADMIN — TACROLIMUS 5 MG: 5 CAPSULE ORAL at 06:01

## 2018-01-04 RX ADMIN — HYDROMORPHONE HYDROCHLORIDE 0.5 MG: 2 INJECTION INTRAMUSCULAR; INTRAVENOUS; SUBCUTANEOUS at 01:01

## 2018-01-04 RX ADMIN — SODIUM CHLORIDE: 0.9 INJECTION, SOLUTION INTRAVENOUS at 02:01

## 2018-01-04 RX ADMIN — TACROLIMUS 5 MG: 5 CAPSULE ORAL at 08:01

## 2018-01-04 RX ADMIN — ACETAMINOPHEN 650 MG: 325 TABLET ORAL at 05:01

## 2018-01-04 NOTE — HPI
Holly Patel is a 27 y.o. female with OLTx for hemangioendothelioma in 1992, followed by Dr. Pinedo, on [place]ro 1mg BID and prednisone 1mg QOD, ESRD on HomeHemo 3x week via PAL-AVF, renovascular HTN, splenomegaly, MRSA bacteremia, chronic rejection of liver transplant, and thrombocytopenia. She was admitted on 1/3/2018 with complaint of lower quadrant abdominal pain and swelling for approximately 7 days. She indicates associated nausea, vomiting, and diarrhea. She dialyses under the care of Dr. Bass in Home [facility] dialysis therapies. She has residual renal funcion and produces about 4 cups of urine daily according to her. She was found to have a UTI upon admission. She denies any dysuria or any urinary symptoms. Hepatology consulted for immunosuppression management.

## 2018-01-04 NOTE — PROGRESS NOTES
HD treatment started. Button hole used for arterial line and 15g needle for venous line. Unsuccessful venous button hole stick. Lines secured to left lower arm. Telemetry in place. No complaints of discomfort at this time.

## 2018-01-04 NOTE — ASSESSMENT & PLAN NOTE
- on empiric abx ( Cephalexin) with previous UCx for pan sensitive enteerococcus shows positive UA but no UCx  - check UCx.

## 2018-01-04 NOTE — PLAN OF CARE
1/02/18                  Hospital Medicine Dept.  [facility] [address] [place] [address]  [phone] after hours  [phone] fax Sherry Stephen has been assessed today and seems to have symptoms of fever, chills and aches and myalgias with chest congestion and lethargy.  Her symptoms seem to be of a viral illness and not deemed fit to work today and likely tomorrow.  Please excuse the patient from duties.  Patient may return on 1/4/18.  No restrictions.     Please contact me if you have any questions.                  __________________________  Alexander Chicas MD  01/02/18

## 2018-01-04 NOTE — HPI
Ms Holly Patel  27 y.o. AA Female with a PMHx relevant for a OHLTx  ESRD on HomeHemo 3x week via PAL-AVF, renovascular HTN, splenomegaly, MRSA bacteremia, chronic rejection of liver transplant, immunosuppressed, CKD-MBD, and thrombocytoenia who presents with complaint of lower quadrant abdominal pain and swelling for approximately 7 days. She indicates associated nausea, vomiting, and diarrhea. She dialyses under the care of Dr. Bass in Home [facility] dialysis therapies. She has residual renal funcion and produces about 4 cups of urine daily according to her. She was found to have a UTI upon admission. She denies any dysuria or any urinary symptoms. Nephrology consulted for ESRD co management. Last HD was on Monday she dialyses for 2 hrs MWF at home via PAL-AVF. She cant recall her EDW but usually UF targets 1-2 lts as tolerated.

## 2018-01-04 NOTE — H&P
[facility] Hospital Medicine  History & Physical    Patient Name: Holly Patel  MRN: [id number]  Admission Date: 1/3/2018  Attending Physician: Alexander Chicas  Primary Care Provider: Stan Sosa MD    [facility] Medicine Team: Networked reference to record PCT  Alexander Chicas MD     Patient information was obtained from patient and ER records.     Subjective:     Principal Problem:Urinary tract infection without hematuria    Chief Complaint:   Chief Complaint   Patient presents with    Abdominal Pain     nvd, liver xplant and on dialyis        HPI: This is a 27 y.o. Female with a history of a liver transplant ESRD on dialyisis, renovascular HTN, splenomegaly, MRSA bacteremia, chronic rejection of liver transplant, immunosuppressed, secondary hyperparathyroidism, and thrombocytoenia who presents with complaint of lower quadrant abdominal pain and swelling for approximately 7 days. She indicates associated nausea, vomiting, and diarrhea. She denies dysuria or any urinary symptoms. In the ED found to have a UTI and uncontrolled BPs which other mother states has been happening for a long time.      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 ()    Moderate protein-calorie malnutrition 2017    MRSA bacteremia 2017    Prophylactic immunotherapy 2014    Renovascular hypertension 10/2/2015    Secondary hyperparathyroidism 2017    Thrombocytopenia 2016       Past Surgical History:   Procedure Laterality Date     SECTION      2     KIDNEY  TRANSPLANT      LIVER BIOPSY      LIVER TRANSPLANT  09/1992    TUBAL LIGATION         Review of patient's allergies indicates:   Allergen Reactions    Chloral hydrate      Other reaction(s): Hallucinations  Other reaction(s): Hives    Hydrocodone Other (See Comments)     Mental status changes       No current facility-administered medications on file prior to encounter.      Current Outpatient Prescriptions on File Prior to Encounter   Medication Sig    amlodipine (NORVASC) 10 MG tablet Take 1 tablet (10 mg total) by mouth once daily.    cinacalcet (SENSIPAR) 90 MG Tab Take 1 tablet (90 mg total) by mouth daily with breakfast.    cloNIDine (CATAPRES) 0.1 MG tablet Take 1 tablet (0.1 mg total) by mouth 2 (two) times daily.    cloNIDine 0.1 mg/24 hr td ptwk (CATAPRES) 0.1 mg/24 hr Place 1 patch onto the skin every 7 days.    labetalol (NORMODYNE) 300 MG tablet Take 2 tablets (600 mg total) by mouth every 12 (twelve) hours.    predniSONE (DELTASONE) 1 MG tablet Take 1 mg by mouth every other day.    tacrolimus (PROGRAF) 1 MG Cap Take 5 capsules (5 mg total) by mouth every 12 (twelve) hours.    food supplemt, lactose-reduced (ENSURE ACTIVE HIGH PROTEIN) Liqd Take 236 mLs by mouth 2 (two) times daily.    triamcinolone acetonide 0.1% (KENALOG) 0.1 % ointment AAA on arms, legs, and neck bid x 1-2 wks then prn flares only (Patient taking differently: Apply to affected area(s) on arms, legs, and neck twice daily x 1-2 wks then as needed for flares only)     Family History     Problem Relation (Age of Onset)    Hypertension Mother, Father        Social History Main Topics    Smoking status: Never Smoker    Smokeless tobacco: Never Used    Alcohol use No    Drug use: No    Sexual activity: Yes     Partners: Male     Review of Systems   Constitutional: Negative for chills and fever.   HENT: Negative for rhinorrhea and sore throat.    Eyes: Negative for pain and discharge.   Respiratory: Negative for cough and  shortness of breath.    Cardiovascular: Negative for chest pain and leg swelling.   Gastrointestinal: Negative for abdominal distention, abdominal pain, blood in stool, nausea and vomiting.   Endocrine: Negative for cold intolerance and heat intolerance.   Genitourinary: Negative for dysuria and flank pain.   Musculoskeletal: Negative for arthralgias and back pain.   Skin: Negative for color change and pallor.   Allergic/Immunologic: Negative for environmental allergies and food allergies.   Neurological: Negative for dizziness and numbness.   Hematological: Negative for adenopathy. Does not bruise/bleed easily.   Psychiatric/Behavioral: Negative for behavioral problems and confusion.     Objective:     Vital Signs (Most Recent):  Temp: 97.5 °F (36.4 °C) (01/03/18 2325)  Pulse: 83 (01/03/18 2325)  Resp: 15 (01/03/18 2325)  BP: (!) 155/104 (01/03/18 2325)  SpO2: 100 % (01/03/18 2325) Vital Signs (24h Range):  Temp:  [97.5 °F (36.4 °C)-98.3 °F (36.8 °C)] 97.5 °F (36.4 °C)  Pulse:  [] 83  Resp:  [15-18] 15  SpO2:  [97 %-100 %] 100 %  BP: (155-228)/(100-146) 155/104     Weight: 57.6 kg (127 lb)  Body mass index is 21.13 kg/m².    Physical Exam   Constitutional: She is oriented to person, place, and time. She appears well-developed and well-nourished.   HENT:   Head: Normocephalic and atraumatic.   Eyes: Conjunctivae are normal. Pupils are equal, round, and reactive to light.   Neck: Normal range of motion. No thyromegaly present.   Cardiovascular: Normal rate, regular rhythm and normal heart sounds.    Pulmonary/Chest: Effort normal and breath sounds normal.   Abdominal: Soft. She exhibits no distension. There is no hepatosplenomegaly. There is no tenderness.   Genitourinary: Rectal exam shows guaiac negative stool.   Musculoskeletal: Normal range of motion. She exhibits no edema.   Lymphadenopathy:     She has no cervical adenopathy.     She has no axillary adenopathy.   Neurological: She is alert and oriented to  person, place, and time.   Skin: No erythema. No pallor.   Psychiatric: She has a normal mood and affect. Her behavior is normal.         CRANIAL NERVES     CN III, IV, VI   Pupils are equal, round, and reactive to light.       Significant Labs:   CBC:   Recent Labs  Lab 01/03/18  1656   WBC 4.41   HGB 8.9*   HCT 27.7*   PLT 69*     CMP:   Recent Labs  Lab 01/03/18  1656      K 4.9      CO2 20*   GLU 93   BUN 84*   CREATININE 17.5*   CALCIUM 8.1*   PROT 7.5   ALBUMIN 2.8*   BILITOT 0.6   ALKPHOS 599*   AST 44*   ALT 47*   ANIONGAP 15   EGFRNONAA 2.4*       Significant Imaging: I have reviewed all pertinent imaging results/findings within the past 24 hours.    Assessment/Plan:     * Urinary tract infection without hematuria    - rocephin 1 g daily and follow up urine cultures         Moderate protein-calorie malnutrition    - PAB and novasource        Immunosuppressed    - see liver transplant           Thrombocytopenia    - monitor         Anemia in ESRD (end-stage renal disease)    - epo with HD        Renovascular hypertension    - uncontrolled  - restart home labetolol and clonidine; switching norvasc to nifedipine and adding hydralazine and will titrate         ESRD on hemodialysis    - gets home HD  - nephrology consult for HD in the AM        Liver replaced by transplant    - hepatology consult  - cont prograf and prednisone; check daily prograf levels            VTE Risk Mitigation         Ordered     Place LINDA hose  Until discontinued      01/03/18 2002     Place sequential compression device  Until discontinued      01/03/18 2002     Low Risk of VTE  Once      01/03/18 2002             Alexander Chicas MD  Department of Hospital Medicine   [facility]

## 2018-01-04 NOTE — CONSULTS
Consult knowledge. Patient seen examined and chart reviewed. Please see full consult note with recs to follow.    Petr Leon MD  Nephrology Fellow   [phone]

## 2018-01-04 NOTE — SUBJECTIVE & OBJECTIVE
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 ()    Moderate protein-calorie malnutrition 2017    MRSA bacteremia 2017    Prophylactic immunotherapy 2014    Renovascular hypertension 10/2/2015    Secondary hyperparathyroidism 2017    Thrombocytopenia 2016       Past Surgical History:   Procedure Laterality Date     SECTION      2     KIDNEY TRANSPLANT      LIVER BIOPSY      LIVER TRANSPLANT  1992    TUBAL LIGATION         Review of patient's allergies indicates:   Allergen Reactions    Chloral hydrate      Other reaction(s): Hallucinations  Other reaction(s): Hives    Hydrocodone Other (See Comments)     Mental status changes       No current facility-administered medications on file prior to encounter.      Current Outpatient Prescriptions on File Prior to Encounter   Medication Sig    amlodipine (NORVASC) 10 MG tablet Take 1 tablet (10 mg total) by mouth once daily.    cinacalcet (SENSIPAR) 90 MG Tab Take 1 tablet (90 mg total) by mouth daily with breakfast.    cloNIDine (CATAPRES) 0.1 MG tablet Take 1 tablet (0.1 mg total) by mouth 2 (two) times daily.    cloNIDine 0.1 mg/24 hr td ptwk (CATAPRES) 0.1 mg/24 hr Place 1 patch onto the skin every 7 days.    labetalol (NORMODYNE) 300 MG tablet Take 2 tablets (600 mg total) by mouth every 12 (twelve) hours.    predniSONE (DELTASONE) 1 MG tablet Take 1 mg by mouth every other day.    tacrolimus (PROGRAF) 1 MG Cap Take 5 capsules (5 mg total) by mouth every 12 (twelve) hours.    food supplemt,  lactose-reduced (ENSURE ACTIVE HIGH PROTEIN) Liqd Take 236 mLs by mouth 2 (two) times daily.    triamcinolone acetonide 0.1% (KENALOG) 0.1 % ointment AAA on arms, legs, and neck bid x 1-2 wks then prn flares only (Patient taking differently: Apply to affected area(s) on arms, legs, and neck twice daily x 1-2 wks then as needed for flares only)     Family History     Problem Relation (Age of Onset)    Hypertension Mother, Father        Social History Main Topics    Smoking status: Never Smoker    Smokeless tobacco: Never Used    Alcohol use No    Drug use: No    Sexual activity: Yes     Partners: Male     Review of Systems   Constitutional: Negative for chills and fever.   HENT: Negative for rhinorrhea and sore throat.    Eyes: Negative for pain and discharge.   Respiratory: Negative for cough and shortness of breath.    Cardiovascular: Negative for chest pain and leg swelling.   Gastrointestinal: Negative for abdominal distention, abdominal pain, blood in stool, nausea and vomiting.   Endocrine: Negative for cold intolerance and heat intolerance.   Genitourinary: Negative for dysuria and flank pain.   Musculoskeletal: Negative for arthralgias and back pain.   Skin: Negative for color change and pallor.   Allergic/Immunologic: Negative for environmental allergies and food allergies.   Neurological: Negative for dizziness and numbness.   Hematological: Negative for adenopathy. Does not bruise/bleed easily.   Psychiatric/Behavioral: Negative for behavioral problems and confusion.     Objective:     Vital Signs (Most Recent):  Temp: 97.5 °F (36.4 °C) (01/03/18 2325)  Pulse: 83 (01/03/18 2325)  Resp: 15 (01/03/18 2325)  BP: (!) 155/104 (01/03/18 2325)  SpO2: 100 % (01/03/18 2325) Vital Signs (24h Range):  Temp:  [97.5 °F (36.4 °C)-98.3 °F (36.8 °C)] 97.5 °F (36.4 °C)  Pulse:  [] 83  Resp:  [15-18] 15  SpO2:  [97 %-100 %] 100 %  BP: (155-228)/(100-146) 155/104     Weight: 57.6 kg (127 lb)  Body mass index is  21.13 kg/m².    Physical Exam   Constitutional: She is oriented to person, place, and time. She appears well-developed and well-nourished.   HENT:   Head: Normocephalic and atraumatic.   Eyes: Conjunctivae are normal. Pupils are equal, round, and reactive to light.   Neck: Normal range of motion. No thyromegaly present.   Cardiovascular: Normal rate, regular rhythm and normal heart sounds.    Pulmonary/Chest: Effort normal and breath sounds normal.   Abdominal: Soft. She exhibits no distension. There is no hepatosplenomegaly. There is no tenderness.   Genitourinary: Rectal exam shows guaiac negative stool.   Musculoskeletal: Normal range of motion. She exhibits no edema.   Lymphadenopathy:     She has no cervical adenopathy.     She has no axillary adenopathy.   Neurological: She is alert and oriented to person, place, and time.   Skin: No erythema. No pallor.   Psychiatric: She has a normal mood and affect. Her behavior is normal.         CRANIAL NERVES     CN III, IV, VI   Pupils are equal, round, and reactive to light.       Significant Labs:   CBC:   Recent Labs  Lab 01/03/18  1656   WBC 4.41   HGB 8.9*   HCT 27.7*   PLT 69*     CMP:   Recent Labs  Lab 01/03/18  1656      K 4.9      CO2 20*   GLU 93   BUN 84*   CREATININE 17.5*   CALCIUM 8.1*   PROT 7.5   ALBUMIN 2.8*   BILITOT 0.6   ALKPHOS 599*   AST 44*   ALT 47*   ANIONGAP 15   EGFRNONAA 2.4*       Significant Imaging: I have reviewed all pertinent imaging results/findings within the past 24 hours.

## 2018-01-04 NOTE — CONSULTS
[facility]  Nephrology  Consult Note    Patient Name: Holly Patel  MRN: [id number]  Admission Date: 1/3/2018  Hospital Length of Stay: 1 days  Attending Provider: Alexander Chicas MD   Primary Care Physician: Stan Sosa MD  Principal Problem:Urinary tract infection without hematuria    Consults  Subjective:     HPI: Ms Holly Patel  27 y.o. AA Female with a PMHx relevant for a [facility]  ESRD on HomeHemo 3x week via PAL-AVF, renovascular HTN, splenomegaly, MRSA bacteremia, chronic rejection of liver transplant, immunosuppressed, CKD-MBD, and thrombocytoenia who presents with complaint of lower quadrant abdominal pain and swelling for approximately 7 days. She indicates associated nausea, vomiting, and diarrhea. She dialyses under the care of Dr. Bass in [facility] dialysis therapies. She has residual renal funcion and produces about 4 cups of urine daily according to her. She was found to have a UTI upon admission. She denies any dysuria or any urinary symptoms. Nephrology consulted for ESRD co management. Last HD was on Monday she dialyses for 2 hrs MWF at home via PAL-AVF. She cant recall her EDW but usually UF targets 1-2 lts as tolerated.    Past Medical History:   Diagnosis Date    Anemia in ESRD (end-stage renal disease) 10/12/2015    Chronic rejection of liver transplant 3/22/2016    ESRD on hemodialysis 9/30/2015    History of splenomegaly 4/12/2016    Immunosuppressed 8/5/2017    Iron deficiency anemia secondary to inadequate dietary iron intake 8/16/2017    She receives IV iron periodically at the [facility].   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)    Moderate protein-calorie malnutrition 8/16/2017    MRSA bacteremia 8/6/2017    Prophylactic immunotherapy 8/4/2014    Renovascular hypertension 10/2/2015    Secondary hyperparathyroidism 8/5/2017    Thrombocytopenia 4/12/2016       Past Surgical History:    Procedure Laterality Date     SECTION      2     KIDNEY TRANSPLANT      LIVER BIOPSY      LIVER TRANSPLANT  1992    TUBAL LIGATION         Review of patient's allergies indicates:   Allergen Reactions    Chloral hydrate      Other reaction(s): Hallucinations  Other reaction(s): Hives    Hydrocodone Other (See Comments)     Mental status changes     Current Facility-Administered Medications   Medication Frequency    0.9%  NaCl infusion PRN    0.9%  NaCl infusion Once    acetaminophen tablet 650 mg Q4H PRN    cephALEXin capsule 500 mg Daily    cinacalcet tablet 90 mg Daily with breakfast    cloNIDine tablet 0.1 mg BID    dextrose 50% injection 12.5 g PRN    dextrose 50% injection 25 g PRN    glucagon (human recombinant) injection 1 mg PRN    glucose chewable tablet 16 g PRN    glucose chewable tablet 24 g PRN    hydrALAZINE tablet 50 mg Q12H    hydromorphone (PF) injection 0.5 mg Q4H PRN    labetalol tablet 600 mg Q8H    NIFEdipine 24 hr tablet 90 mg Daily    ondansetron disintegrating tablet 8 mg Q8H PRN    predniSONE tablet 1 mg Every other day    sodium chloride 0.9% bolus 1,000 mL Once    sodium chloride 0.9% flush 5 mL PRN    tacrolimus capsule 5 mg BID     Family History     Problem Relation (Age of Onset)    Hypertension Mother, Father        Social History Main Topics    Smoking status: Never Smoker    Smokeless tobacco: Never Used    Alcohol use No    Drug use: No    Sexual activity: Yes     Partners: Male     Review of Systems   Constitutional: Positive for fatigue. Negative for appetite change, fever and unexpected weight change.   HENT: Negative for facial swelling, hearing loss and tinnitus.    Eyes: Negative for visual disturbance.   Respiratory: Negative for chest tightness and shortness of breath.    Cardiovascular: Negative for chest pain and leg swelling.   Gastrointestinal: Negative for abdominal pain and nausea.   Genitourinary: Positive for decreased  urine volume and dysuria. Negative for difficulty urinating and flank pain.   Musculoskeletal: Negative for arthralgias and myalgias.   Skin: Negative for color change.   Neurological: Positive for weakness. Negative for dizziness, syncope, light-headedness and headaches.   Psychiatric/Behavioral: Negative for behavioral problems and confusion.     Objective:     Vital Signs (Most Recent):  Temp: 97.6 °F (36.4 °C) (01/04/18 1319)  Pulse: 81 (01/04/18 1319)  Resp: 17 (01/04/18 1319)  BP: 135/81 (01/04/18 1319)  SpO2: 99 % (01/04/18 1319)  O2 Device (Oxygen Therapy): room air (01/04/18 1319) Vital Signs (24h Range):  Temp:  [97.3 °F (36.3 °C)-98.3 °F (36.8 °C)] 97.6 °F (36.4 °C)  Pulse:  [] 81  Resp:  [14-18] 17  SpO2:  [97 %-100 %] 99 %  BP: (116-228)/() 135/81     Weight: 57.6 kg (127 lb) (01/03/18 2325)  Body mass index is 21.13 kg/m².  Body surface area is 1.63 meters squared.    I/O last 3 completed shifts:  In: 350 [P.O.:350]  Out: -     Physical Exam   Constitutional: She is oriented to person, place, and time. She appears well-developed and well-nourished. No distress.   HENT:   Head: Normocephalic and atraumatic.   Eyes: Conjunctivae are normal. Pupils are equal, round, and reactive to light.   Neck: Trachea normal and normal range of motion. Neck supple. No JVD present.   Cardiovascular: Normal rate, regular rhythm, S1 normal, S2 normal, normal heart sounds, intact distal pulses and normal pulses.  Exam reveals no gallop and no friction rub.    No murmur heard.  Pulmonary/Chest: Effort normal. She has decreased breath sounds in the right lower field and the left lower field. She has no wheezes. She has no rales.   Abdominal: Soft. Bowel sounds are normal. She exhibits no distension. There is no tenderness.   Musculoskeletal: Normal range of motion. She exhibits no edema.   Neurological: She is alert and oriented to person, place, and time.   Skin: Skin is warm and dry. Capillary refill takes less  than 2 seconds.   Psychiatric: She has a normal mood and affect. Her behavior is normal.   Vitals reviewed.      Significant Labs:  BMP:   Recent Labs  Lab 01/04/18  0444   *      CO2 17*   BUN 90*   CREATININE 17.9*   CALCIUM 7.5*   MG 1.9     Cardiac Markers: No results for input(s): CKMB, TROPONINT, MYOGLOBIN in the last 168 hours.  CBC:   Recent Labs  Lab 01/04/18  0444   WBC 3.89*   RBC 2.96*   HGB 7.6*   HCT 23.2*   PLT 58*   MCV 78*   MCH 25.7*   MCHC 32.8     CMP:   Recent Labs  Lab 01/04/18  0444   *   CALCIUM 7.5*   ALBUMIN 2.4*   PROT 6.4   *   K 4.9   CO2 17*      BUN 90*   CREATININE 17.9*   ALKPHOS 489*   ALT 36   AST 29   BILITOT 0.5       Recent Labs  Lab 01/03/18  1652   COLORU Yellow   SPECGRAV 1.010   PHUR 7.0   PROTEINUA 3+*   BACTERIA Many*   NITRITE Negative   LEUKOCYTESUR 3+*   UROBILINOGEN Negative   HYALINECASTS 0     All labs within the past 24 hours have been reviewed.    Significant Imaging:  Labs: Reviewed  ECG: Reviewed    Assessment/Plan:     * Urinary tract infection without hematuria    - on empiric abx ( Cephalexin) with previous UCx for pan sensitive enteerococcus shows positive UA but no UCx  - check UCx.         ESRD on hemodialysis    ESRD on IHD MWF   Out patient HD Center - [facility] [place] home therapies Luann/ Dr. Bass  On HD for: 2 years  Duration of outpatient dialysis session - ~ 2 hrs per patient  EDW - Unknown   Residual Renal Function - yes about 4 cups daily  - Will provide dialysis for metabolic clearance and volume management x 3 hrs   - Seen and examined today during hemodialysis; tolerating treatment well without issues. Denied headaches, chest pain, abdominal pain, or muscle cramps   -  ml/min  - Target ultrafiltration 1-2 lts as tolerated keep MAP >65  - Dialysate adjusted to current labs   Access:PAL-AVF with good thrill and Bruit          Anemia in ESRD (end-stage renal disease)    - Hg  7.6 m not on Target  - Will resume MARIA T   With HD   -Will request iron studies to assess further needs of supplementation                      Thank you for your consult. I will follow-up with patient. Please contact us if you have any additional questions.    Petr Leon MD  Nephrology  [facility]        I have reviewed and concur with the fellow's history, physical, assessment, and plan. I have personally interviewed and examined the patient at bedside.

## 2018-01-04 NOTE — SUBJECTIVE & OBJECTIVE
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 ()    Moderate protein-calorie malnutrition 2017    MRSA bacteremia 2017    Prophylactic immunotherapy 2014    Renovascular hypertension 10/2/2015    Secondary hyperparathyroidism 2017    Thrombocytopenia 2016       Past Surgical History:   Procedure Laterality Date     SECTION      2     KIDNEY TRANSPLANT      LIVER BIOPSY      LIVER TRANSPLANT  1992    TUBAL LIGATION         Review of patient's allergies indicates:   Allergen Reactions    Chloral hydrate      Other reaction(s): Hallucinations  Other reaction(s): Hives    Hydrocodone Other (See Comments)     Mental status changes     Current Facility-Administered Medications   Medication Frequency    0.9%  NaCl infusion PRN    0.9%  NaCl infusion Once    acetaminophen tablet 650 mg Q4H PRN    cephALEXin capsule 500 mg Daily    cinacalcet tablet 90 mg Daily with breakfast    cloNIDine tablet 0.1 mg BID    dextrose 50% injection 12.5 g PRN    dextrose 50% injection 25 g PRN    glucagon (human recombinant) injection 1 mg PRN    glucose chewable tablet 16 g PRN    glucose chewable tablet 24 g PRN    hydrALAZINE tablet 50 mg Q12H    hydromorphone (PF) injection 0.5 mg Q4H PRN    labetalol tablet 600 mg Q8H    NIFEdipine 24 hr tablet 90 mg Daily    ondansetron disintegrating tablet 8 mg Q8H PRN    predniSONE tablet 1 mg Every other day    sodium chloride 0.9% bolus 1,000 mL Once    sodium chloride 0.9% flush 5 mL PRN    tacrolimus capsule 5 mg  BID     Family History     Problem Relation (Age of Onset)    Hypertension Mother, Father        Social History Main Topics    Smoking status: Never Smoker    Smokeless tobacco: Never Used    Alcohol use No    Drug use: No    Sexual activity: Yes     Partners: Male     Review of Systems   Constitutional: Positive for fatigue. Negative for appetite change, fever and unexpected weight change.   HENT: Negative for facial swelling, hearing loss and tinnitus.    Eyes: Negative for visual disturbance.   Respiratory: Negative for chest tightness and shortness of breath.    Cardiovascular: Negative for chest pain and leg swelling.   Gastrointestinal: Negative for abdominal pain and nausea.   Genitourinary: Positive for decreased urine volume and dysuria. Negative for difficulty urinating and flank pain.   Musculoskeletal: Negative for arthralgias and myalgias.   Skin: Negative for color change.   Neurological: Positive for weakness. Negative for dizziness, syncope, light-headedness and headaches.   Psychiatric/Behavioral: Negative for behavioral problems and confusion.     Objective:     Vital Signs (Most Recent):  Temp: 97.6 °F (36.4 °C) (01/04/18 1319)  Pulse: 81 (01/04/18 1319)  Resp: 17 (01/04/18 1319)  BP: 135/81 (01/04/18 1319)  SpO2: 99 % (01/04/18 1319)  O2 Device (Oxygen Therapy): room air (01/04/18 1319) Vital Signs (24h Range):  Temp:  [97.3 °F (36.3 °C)-98.3 °F (36.8 °C)] 97.6 °F (36.4 °C)  Pulse:  [] 81  Resp:  [14-18] 17  SpO2:  [97 %-100 %] 99 %  BP: (116-228)/() 135/81     Weight: 57.6 kg (127 lb) (01/03/18 2325)  Body mass index is 21.13 kg/m².  Body surface area is 1.63 meters squared.    I/O last 3 completed shifts:  In: 350 [P.O.:350]  Out: -     Physical Exam   Constitutional: She is oriented to person, place, and time. She appears well-developed and well-nourished. No distress.   HENT:   Head: Normocephalic and atraumatic.   Eyes: Conjunctivae are normal. Pupils are equal, round, and  reactive to light.   Neck: Trachea normal and normal range of motion. Neck supple. No JVD present.   Cardiovascular: Normal rate, regular rhythm, S1 normal, S2 normal, normal heart sounds, intact distal pulses and normal pulses.  Exam reveals no gallop and no friction rub.    No murmur heard.  Pulmonary/Chest: Effort normal. She has decreased breath sounds in the right lower field and the left lower field. She has no wheezes. She has no rales.   Abdominal: Soft. Bowel sounds are normal. She exhibits no distension. There is no tenderness.   Musculoskeletal: Normal range of motion. She exhibits no edema.   Neurological: She is alert and oriented to person, place, and time.   Skin: Skin is warm and dry. Capillary refill takes less than 2 seconds.   Psychiatric: She has a normal mood and affect. Her behavior is normal.   Vitals reviewed.      Significant Labs:  BMP:   Recent Labs  Lab 01/04/18  0444   *      CO2 17*   BUN 90*   CREATININE 17.9*   CALCIUM 7.5*   MG 1.9     Cardiac Markers: No results for input(s): CKMB, TROPONINT, MYOGLOBIN in the last 168 hours.  CBC:   Recent Labs  Lab 01/04/18  0444   WBC 3.89*   RBC 2.96*   HGB 7.6*   HCT 23.2*   PLT 58*   MCV 78*   MCH 25.7*   MCHC 32.8     CMP:   Recent Labs  Lab 01/04/18  0444   *   CALCIUM 7.5*   ALBUMIN 2.4*   PROT 6.4   *   K 4.9   CO2 17*      BUN 90*   CREATININE 17.9*   ALKPHOS 489*   ALT 36   AST 29   BILITOT 0.5       Recent Labs  Lab 01/03/18  1652   COLORU Yellow   SPECGRAV 1.010   PHUR 7.0   PROTEINUA 3+*   BACTERIA Many*   NITRITE Negative   LEUKOCYTESUR 3+*   UROBILINOGEN Negative   HYALINECASTS 0     All labs within the past 24 hours have been reviewed.    Significant Imaging:  Labs: Reviewed  ECG: Reviewed

## 2018-01-04 NOTE — HPI
This is a 27 y.o. Female with a history of a liver transplant ESRD on dialyisis, renovascular HTN, splenomegaly, MRSA bacteremia, chronic rejection of liver transplant, immunosuppressed, secondary hyperparathyroidism, and thrombocytoenia who presents with complaint of lower quadrant abdominal pain and swelling for approximately 7 days. She indicates associated nausea, vomiting, and diarrhea. She denies dysuria or any urinary symptoms. In the ED found to have a UTI and uncontrolled BPs which other mother states has been happening for a long time.

## 2018-01-04 NOTE — SUBJECTIVE & OBJECTIVE
Review of Systems   Constitutional: Positive for fatigue. Negative for appetite change, fever and unexpected weight change.   HENT: Negative for facial swelling, hearing loss and tinnitus.    Eyes: Negative for visual disturbance.   Respiratory: Negative for chest tightness and shortness of breath.    Cardiovascular: Negative for chest pain and leg swelling.   Gastrointestinal: Negative for abdominal pain and nausea.   Genitourinary: Positive for decreased urine volume and dysuria. Negative for difficulty urinating and flank pain.   Musculoskeletal: Negative for arthralgias and myalgias.   Skin: Negative for color change.   Neurological: Positive for weakness. Negative for dizziness, syncope, light-headedness and headaches.   Psychiatric/Behavioral: Negative for behavioral problems and confusion.       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 ()    Moderate protein-calorie malnutrition 2017    MRSA bacteremia 2017    Prophylactic immunotherapy 2014    Renovascular hypertension 10/2/2015    Secondary hyperparathyroidism 2017    Thrombocytopenia 2016       Past Surgical History:   Procedure Laterality Date     SECTION      2     KIDNEY TRANSPLANT      LIVER BIOPSY      LIVER TRANSPLANT  1992    TUBAL LIGATION         Family history of liver disease: No    Review of patient's allergies indicates:   Allergen Reactions    Chloral hydrate      Other reaction(s): Hallucinations  Other reaction(s): Hives    Hydrocodone Other (See Comments)     Mental status  changes       Social History Main Topics    Smoking status: Never Smoker    Smokeless tobacco: Never Used    Alcohol use No    Drug use: No    Sexual activity: Yes     Partners: Male       Prescriptions Prior to Admission   Medication Sig Dispense Refill Last Dose    amlodipine (NORVASC) 10 MG tablet Take 1 tablet (10 mg total) by mouth once daily. 7 tablet 1 1/2/2018    cinacalcet (SENSIPAR) 90 MG Tab Take 1 tablet (90 mg total) by mouth daily with breakfast. 30 tablet 3 1/3/2018    cloNIDine (CATAPRES) 0.1 MG tablet Take 1 tablet (0.1 mg total) by mouth 2 (two) times daily. 60 tablet 11 1/3/2018    cloNIDine 0.1 mg/24 hr td ptwk (CATAPRES) 0.1 mg/24 hr Place 1 patch onto the skin every 7 days. 4 patch 11 1/3/2018    labetalol (NORMODYNE) 300 MG tablet Take 2 tablets (600 mg total) by mouth every 12 (twelve) hours. 120 tablet 11 1/3/2018    predniSONE (DELTASONE) 1 MG tablet Take 1 mg by mouth every other day.   1/3/2018    tacrolimus (PROGRAF) 1 MG Cap Take 5 capsules (5 mg total) by mouth every 12 (twelve) hours. 300 capsule 11 1/3/2018    food supplemt, lactose-reduced (ENSURE ACTIVE HIGH PROTEIN) Liqd Take 236 mLs by mouth 2 (two) times daily. 60 Can 6 Taking    triamcinolone acetonide 0.1% (KENALOG) 0.1 % ointment AAA on arms, legs, and neck bid x 1-2 wks then prn flares only (Patient taking differently: Apply to affected area(s) on arms, legs, and neck twice daily x 1-2 wks then as needed for flares only) 80 g 3 Taking       Objective:     Vital Signs (Most Recent):  Temp: 98 °F (36.7 °C) (01/04/18 1415)  Pulse: (P) 83 (01/04/18 1600)  Resp: (P) 18 (01/04/18 1600)  BP: (!) (P) 143/85 (01/04/18 1600)  SpO2: 99 % (01/04/18 1319) Vital Signs (24h Range):  Temp:  [97.3 °F (36.3 °C)-98.3 °F (36.8 °C)] 98 °F (36.7 °C)  Pulse:  [] (P) 83  Resp:  [14-18] (P) 18  SpO2:  [97 %-100 %] 99 %  BP: (116-226)/() (P) 143/85     Weight: 57.6 kg (127 lb) (01/03/18 3055)  Body mass index is 21.13  kg/m².    Physical Exam   Constitutional: She is oriented to person, place, and time. She appears ill.   Eyes: No scleral icterus.   Cardiovascular: Normal rate.  Exam reveals no friction rub.    Pulmonary/Chest: Effort normal. No respiratory distress.   Abdominal: Soft. Bowel sounds are normal. She exhibits no distension and no mass. There is no tenderness. There is no rebound and no guarding. No hernia.   scar   Musculoskeletal: She exhibits no edema.   Neurological: She is alert and oriented to person, place, and time.       MELD-Na score: 22 at 1/4/2018  4:44 AM  MELD score: 21 at 1/4/2018  4:44 AM  Calculated from:  Serum Creatinine: On dialysis. Using 4 mg/dL.  Serum Sodium: 135 mmol/L at 1/4/2018  4:44 AM  Total Bilirubin: 0.5 mg/dL (Rounded to 1) at 1/4/2018  4:44 AM  INR(ratio): 1.1 at 1/4/2018  4:44 AM  Age: 27 years    Lab Results   Component Value Date    WBC 3.89 (L) 01/04/2018    HGB 7.6 (L) 01/04/2018    HCT 23.2 (L) 01/04/2018    MCV 78 (L) 01/04/2018    PLT 58 (L) 01/04/2018     Lab Results   Component Value Date    INR 1.1 01/04/2018     Lab Results   Component Value Date     (L) 01/04/2018    K 4.9 01/04/2018    CREATININE 17.9 (H) 01/04/2018     Lab Results   Component Value Date    ALBUMIN 2.4 (L) 01/04/2018    ALT 36 01/04/2018    AST 29 01/04/2018     (H) 06/27/2012    ALKPHOS 489 (H) 01/04/2018    BILITOT 0.5 01/04/2018     Lab Results   Component Value Date    AFP 2.36 MoM (95.9 ng/mL) 09/17/2008     Lab Results   Component Value Date    LIPASE 54 01/03/2018     Lab Results   Component Value Date    TACROLIMUS 5.7 01/04/2018       Imaging:  Reviewed and as noted in HPI.

## 2018-01-04 NOTE — PLAN OF CARE
"Problem: Patient Care Overview  Goal: Plan of Care Review  Outcome: Ongoing (interventions implemented as appropriate)   01/04/18 8033   Coping/Psychosocial   Plan Of Care Reviewed With patient     Assumed care of patient at 2300. Aox4. No complaints at this time; denies nausea at this time. Patient a little drowsy in beginning of shift; but after 2 am rounds patient found awake and she says " I feel much better now" after I asked how she was feeling. Patient finally was in mood to eat something. Patient oriented to floor and room. Mother at bedside. Bed in lowest position, call light in reach. Will continue to monitor.      "

## 2018-01-04 NOTE — ASSESSMENT & PLAN NOTE
- uncontrolled  - restart home labetolol and clonidine; switching norvasc to nifedipine and adding hydralazine and will titrate

## 2018-01-04 NOTE — ASSESSMENT & PLAN NOTE
- Hg  7.6 m not on [employer]  - Will resume MARIA T  With HD   -Will request iron studies to assess further needs of supplementation

## 2018-01-04 NOTE — ASSESSMENT & PLAN NOTE
Holly Patel is a 27 y.o. female with OLTx for hemangioendothelioma in 1992, followed by Dr. Pinedo, on Tacro 1mg BID and prednisone 1mg QOD, ESRD on HomeHemo 3x week via PAL-AVF, renovascular HTN, splenomegaly, MRSA bacteremia, chronic rejection of liver transplant, and thrombocytopenia admitted on 1/3/2018 with complaint of lower quadrant abdominal pain and swelling for approximately 7 days and found to have a UTI upon admission.     Home regimen includes: tacro 1mg BID, pred 1mg QoD  Current Prograf level =   Lab Results   Component Value Date    TACROLIMUS 5.7 01/04/2018       Plan:  1. Please continue Prograf at 1mg BID, continue home dose prednisone  2. Check daily AM trough Prograf levels while in the hospital.  3. Target Prograf levels are 6-8  4. Continue UTI treatment  5. Will follow daily levels and be available for any questions.

## 2018-01-04 NOTE — PLAN OF CARE
Stan Sosa MD  [address] / [place] [address]    Future Appointments  Date Time Provider Department Center   1/12/2018 8:30 AM [facility] LAB [place]   1/17/2018 1:20 PM Viktor Bass MD [facility] NE[facility]RO [place]   3/7/2018 1:40 PM Stan Sosa MD [facility]     Payor: MEDICARE / Plan: MEDICARE PART A & B / Product Type: [facility] /        01/04/18 1144   Discharge Assessment   Assessment Type Discharge Planning Assessment   Confirmed/corrected address and phone number on facesheet? Yes   Expected Length of Stay (days) 3   Communicated expected length of stay with patient/caregiver yes   Prior to hospitilization cognitive status: Alert/Oriented   Prior to hospitalization functional status: Independent   Current cognitive status: Alert/Oriented   Current Functional Status: Independent   Lives With parent(s)   Able to Return to Prior Arrangements yes   Is patient able to care for self after discharge? Yes   Who are your caregiver(s) and their phone number(s)? ([place]  mother [phone])   Patient's perception of discharge disposition home or selfcare   Equipment Currently Used at Home (home dialysis equipment)   Does the patient have transportation home? Yes   Transportation Available family or friend will provide   Dialysis Name and Scheduled days (home dialysis / Next Stage for supplies)   Discharge Plan A Home with family   Discharge Plan B Home with family   Patient/Family In Agreement With Plan yes

## 2018-01-04 NOTE — UM SECONDARY REVIEW
Physician Advisor External    Level of Care Issue    Denied Inpatient- 01/04/2018 @ 1020 am- per Dr. Emily Peraza, EHR, approves downgrade to OBS.

## 2018-01-04 NOTE — CONSULTS
[facility]  Hepatology  Consult Note    Patient Name: Holly Patel  MRN: [id number]  Admission Date: 1/3/2018  Hospital Length of Stay: 1 days  Attending Provider: Alexander Chicas MD   Primary Care Physician: Stan Sosa MD  Principal Problem:Urinary tract infection without hematuria    Inpatient consult to Hepatology  Consult performed by: REGI WARD  Consult ordered by: ALEXANDER CHICAS  Reason for consult: IS        Subjective:     Transplant status: Post-transplant    HPI:  Holly Patel is a 27 y.o. female with OLTx for hemangioendothelioma in 1992, followed by Dr. Pinedo, on Tacro 1mg BID and prednisone 1mg QOD, ESRD on HomeHemo 3x week via PAL-AVF, renovascular HTN, splenomegaly, MRSA bacteremia, chronic rejection of liver transplant, and thrombocytopenia. She was admitted on 1/3/2018 with complaint of lower quadrant abdominal pain and swelling for approximately 7 days. She indicates associated nausea, vomiting, and diarrhea. She dialyses under the care of Dr. Bass in [facility] dialysis therapies. She has residual renal funcion and produces about 4 cups of urine daily according to her. She was found to have a UTI upon admission. She denies any dysuria or any urinary symptoms. Hepatology consulted for immunosuppression management.     Review of Systems   Constitutional: Positive for fatigue. Negative for appetite change, fever and unexpected weight change.   HENT: Negative for facial swelling, hearing loss and tinnitus.    Eyes: Negative for visual disturbance.   Respiratory: Negative for chest tightness and shortness of breath.    Cardiovascular: Negative for chest pain and leg swelling.   Gastrointestinal: Negative for abdominal pain and nausea.   Genitourinary: Positive for decreased urine volume and dysuria. Negative for difficulty urinating and flank pain.   Musculoskeletal: Negative for arthralgias and myalgias.   Skin: Negative for color  change.   Neurological: Positive for weakness. Negative for dizziness, syncope, light-headedness and headaches.   Psychiatric/Behavioral: Negative for behavioral problems and confusion.       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 ()    Moderate protein-calorie malnutrition 2017    MRSA bacteremia 2017    Prophylactic immunotherapy 2014    Renovascular hypertension 10/2/2015    Secondary hyperparathyroidism 2017    Thrombocytopenia 2016       Past Surgical History:   Procedure Laterality Date     SECTION      2     KIDNEY TRANSPLANT      LIVER BIOPSY      LIVER TRANSPLANT  1992    TUBAL LIGATION         Family history of liver disease: No    Review of patient's allergies indicates:   Allergen Reactions    Chloral hydrate      Other reaction(s): Hallucinations  Other reaction(s): Hives    Hydrocodone Other (See Comments)     Mental status changes       Social History Main Topics    Smoking status: Never Smoker    Smokeless tobacco: Never Used    Alcohol use No    Drug use: No    Sexual activity: Yes     Partners: Male       Prescriptions Prior to Admission   Medication Sig Dispense Refill Last Dose    amlodipine (NORVASC) 10 MG tablet Take 1 tablet (10 mg total) by mouth once daily. 7 tablet 1 2018    cinacalcet (SENSIPAR) 90 MG Tab Take 1 tablet (90 mg total) by mouth daily with breakfast. 30 tablet 3 1/3/2018    cloNIDine (CATAPRES) 0.1 MG tablet Take 1 tablet (0.1 mg total) by mouth 2 (two) times daily. 60 tablet 11 1/3/2018     cloNIDine 0.1 mg/24 hr td ptwk (CATAPRES) 0.1 mg/24 hr Place 1 patch onto the skin every 7 days. 4 patch 11 1/3/2018    labetalol (NORMODYNE) 300 MG tablet Take 2 tablets (600 mg total) by mouth every 12 (twelve) hours. 120 tablet 11 1/3/2018    predniSONE (DELTASONE) 1 MG tablet Take 1 mg by mouth every other day.   1/3/2018    tacrolimus (PROGRAF) 1 MG Cap Take 5 capsules (5 mg total) by mouth every 12 (twelve) hours. 300 capsule 11 1/3/2018    food supplemt, lactose-reduced (ENSURE ACTIVE HIGH PROTEIN) Liqd Take 236 mLs by mouth 2 (two) times daily. 60 Can 6 Taking    triamcinolone acetonide 0.1% (KENALOG) 0.1 % ointment AAA on arms, legs, and neck bid x 1-2 wks then prn flares only (Patient taking differently: Apply to affected area(s) on arms, legs, and neck twice daily x 1-2 wks then as needed for flares only) 80 g 3 Taking       Objective:     Vital Signs (Most Recent):  Temp: 98 °F (36.7 °C) (01/04/18 1415)  Pulse: (P) 83 (01/04/18 1600)  Resp: (P) 18 (01/04/18 1600)  BP: (!) (P) 143/85 (01/04/18 1600)  SpO2: 99 % (01/04/18 1319) Vital Signs (24h Range):  Temp:  [97.3 °F (36.3 °C)-98.3 °F (36.8 °C)] 98 °F (36.7 °C)  Pulse:  [] (P) 83  Resp:  [14-18] (P) 18  SpO2:  [97 %-100 %] 99 %  BP: (116-226)/() (P) 143/85     Weight: 57.6 kg (127 lb) (01/03/18 2325)  Body mass index is 21.13 kg/m².    Physical Exam   Constitutional: She is oriented to person, place, and time. She appears ill.   Eyes: No scleral icterus.   Cardiovascular: Normal rate.  Exam reveals no friction rub.    Pulmonary/Chest: Effort normal. No respiratory distress.   Abdominal: Soft. Bowel sounds are normal. She exhibits no distension and no mass. There is no tenderness. There is no rebound and no guarding. No hernia.   scar   Musculoskeletal: She exhibits no edema.   Neurological: She is alert and oriented to person, place, and time.       MELD-Na score: 22 at 1/4/2018  4:44 AM  MELD score: 21 at 1/4/2018  4:44 AM  Calculated  from:  Serum Creatinine: On dialysis. Using 4 mg/dL.  Serum Sodium: 135 mmol/L at 1/4/2018  4:44 AM  Total Bilirubin: 0.5 mg/dL (Rounded to 1) at 1/4/2018  4:44 AM  INR(ratio): 1.1 at 1/4/2018  4:44 AM  Age: 27 years    Lab Results   Component Value Date    WBC 3.89 (L) 01/04/2018    HGB 7.6 (L) 01/04/2018    HCT 23.2 (L) 01/04/2018    MCV 78 (L) 01/04/2018    PLT 58 (L) 01/04/2018     Lab Results   Component Value Date    INR 1.1 01/04/2018     Lab Results   Component Value Date     (L) 01/04/2018    K 4.9 01/04/2018    CREATININE 17.9 (H) 01/04/2018     Lab Results   Component Value Date    ALBUMIN 2.4 (L) 01/04/2018    ALT 36 01/04/2018    AST 29 01/04/2018     (H) 06/27/2012    ALKPHOS 489 (H) 01/04/2018    BILITOT 0.5 01/04/2018     Lab Results   Component Value Date    AFP 2.36 MoM (95.9 ng/mL) 09/17/2008     Lab Results   Component Value Date    LIPASE 54 01/03/2018     Lab Results   Component Value Date    TACROLIMUS 5.7 01/04/2018       Imaging:  Reviewed and as noted in HPI.         Assessment/Plan:     * Urinary tract infection without hematuria    As above        Liver replaced by transplant    Holly Patel is a 27 y.o. female with OLTx for hemangioendothelioma in 1992, followed by Dr. Pinedo, on Tacro 1mg BID and prednisone 1mg QOD, ESRD on HomeHemo 3x week via PAL-AVF, renovascular HTN, splenomegaly, MRSA bacteremia, chronic rejection of liver transplant, and thrombocytopenia admitted on 1/3/2018 with complaint of lower quadrant abdominal pain and swelling for approximately 7 days and found to have a UTI upon admission.     Home regimen includes: tacro 1mg BID, pred 1mg QoD  Current Prograf level =   Lab Results   Component Value Date    TACROLIMUS 5.7 01/04/2018       Plan:  1. Please continue Prograf at 1mg BID, continue home dose prednisone  2. Check daily AM trough Prograf levels while in the hospital.  3. Target Prograf levels are 6-8  4. Continue UTI treatment  5. Will  follow daily levels and be available for any questions.               Thank you for your consult. I will follow-up with patient. Please contact us if you have any additional questions.    Hany Jarrell MD  Hepatology  [facility]

## 2018-01-04 NOTE — ASSESSMENT & PLAN NOTE
ESRD on IHD MWF   Out patient HD Center - [facility] home therapies [place]/ Dr. Bass  On HD for: 2 years  Duration of outpatient dialysis session - ~ 2 hrs per patient  EDW - Unknown   Residual Renal Function - yes about 4 cups daily  - Will provide dialysis for metabolic clearance and volume management x 3 hrs   - Seen and examined today during hemodialysis; tolerating treatment well without issues. Denied headaches, chest pain, abdominal pain, or muscle cramps   -  ml/min  - Target ultrafiltration 1-2 lts as tolerated keep MAP >65  - Dialysate adjusted to current labs   Access:PAL-AVF with good thrill and Bruit

## 2018-01-05 ENCOUNTER — TELEPHONE (OUTPATIENT)
Dept: INTERNAL MEDICINE | Facility: CLINIC | Age: 28
End: 2018-01-05

## 2018-01-05 VITALS
TEMPERATURE: 99 F | OXYGEN SATURATION: 97 % | BODY MASS INDEX: 21.16 KG/M2 | DIASTOLIC BLOOD PRESSURE: 77 MMHG | SYSTOLIC BLOOD PRESSURE: 131 MMHG | HEIGHT: 65 IN | WEIGHT: 127 LBS | HEART RATE: 86 BPM | RESPIRATION RATE: 18 BRPM

## 2018-01-05 LAB
ALBUMIN SERPL BCP-MCNC: 2.2 G/DL
ALP SERPL-CCNC: 455 U/L
ALT SERPL W/O P-5'-P-CCNC: 29 U/L
ANION GAP SERPL CALC-SCNC: 8 MMOL/L
AST SERPL-CCNC: 23 U/L
BASOPHILS # BLD AUTO: 0.02 K/UL
BASOPHILS NFR BLD: 0.6 %
BILIRUB SERPL-MCNC: 0.4 MG/DL
BUN SERPL-MCNC: 30 MG/DL
CALCIUM SERPL-MCNC: 7.1 MG/DL
CHLORIDE SERPL-SCNC: 102 MMOL/L
CO2 SERPL-SCNC: 27 MMOL/L
CREAT SERPL-MCNC: 8.9 MG/DL
DIFFERENTIAL METHOD: ABNORMAL
EOSINOPHIL # BLD AUTO: 0.2 K/UL
EOSINOPHIL NFR BLD: 5 %
ERYTHROCYTE [DISTWIDTH] IN BLOOD BY AUTOMATED COUNT: 15.7 %
EST. GFR  (AFRICAN AMERICAN): 6.3 ML/MIN/1.73 M^2
EST. GFR  (NON AFRICAN AMERICAN): 5.5 ML/MIN/1.73 M^2
GLUCOSE SERPL-MCNC: 82 MG/DL
HCT VFR BLD AUTO: 24.6 %
HGB BLD-MCNC: 8 G/DL
IMM GRANULOCYTES # BLD AUTO: 0.05 K/UL
IMM GRANULOCYTES NFR BLD AUTO: 1.5 %
LYMPHOCYTES # BLD AUTO: 0.8 K/UL
LYMPHOCYTES NFR BLD: 22.8 %
MAGNESIUM SERPL-MCNC: 1.6 MG/DL
MCH RBC QN AUTO: 25.6 PG
MCHC RBC AUTO-ENTMCNC: 32.5 G/DL
MCV RBC AUTO: 79 FL
MONOCYTES # BLD AUTO: 0.2 K/UL
MONOCYTES NFR BLD: 7.1 %
NEUTROPHILS # BLD AUTO: 2.1 K/UL
NEUTROPHILS NFR BLD: 63 %
NRBC BLD-RTO: 0 /100 WBC
PHOSPHATE SERPL-MCNC: 4.6 MG/DL
PLATELET # BLD AUTO: 80 K/UL
PMV BLD AUTO: 10.9 FL
POTASSIUM SERPL-SCNC: 3.9 MMOL/L
PROT SERPL-MCNC: 6 G/DL
RBC # BLD AUTO: 3.12 M/UL
SODIUM SERPL-SCNC: 137 MMOL/L
TACROLIMUS BLD-MCNC: 6 NG/ML
WBC # BLD AUTO: 3.38 K/UL

## 2018-01-05 PROCEDURE — 83735 ASSAY OF MAGNESIUM: CPT

## 2018-01-05 PROCEDURE — 25000003 PHARM REV CODE 250: Performed by: HOSPITALIST

## 2018-01-05 PROCEDURE — 85025 COMPLETE CBC W/AUTO DIFF WBC: CPT

## 2018-01-05 PROCEDURE — 80197 ASSAY OF TACROLIMUS: CPT

## 2018-01-05 PROCEDURE — 63600175 PHARM REV CODE 636 W HCPCS: Performed by: HOSPITALIST

## 2018-01-05 PROCEDURE — 36415 COLL VENOUS BLD VENIPUNCTURE: CPT

## 2018-01-05 PROCEDURE — 99233 SBSQ HOSP IP/OBS HIGH 50: CPT | Mod: ,,, | Performed by: HOSPITALIST

## 2018-01-05 PROCEDURE — 80053 COMPREHEN METABOLIC PANEL: CPT

## 2018-01-05 PROCEDURE — 84100 ASSAY OF PHOSPHORUS: CPT

## 2018-01-05 RX ORDER — NIFEDIPINE 60 MG/1
60 TABLET, EXTENDED RELEASE ORAL DAILY
Qty: 30 TABLET | Refills: 2 | Status: CANCELLED | OUTPATIENT
Start: 2018-01-05 | End: 2019-01-01

## 2018-01-05 RX ORDER — AMOXICILLIN AND CLAVULANATE POTASSIUM 500; 125 MG/1; MG/1
1 TABLET, FILM COATED ORAL DAILY
Qty: 3 TABLET | Refills: 0 | Status: SHIPPED | OUTPATIENT
Start: 2018-01-05 | End: 2018-01-11

## 2018-01-05 RX ORDER — MAGNESIUM SULFATE HEPTAHYDRATE 40 MG/ML
2 INJECTION, SOLUTION INTRAVENOUS ONCE
Status: COMPLETED | OUTPATIENT
Start: 2018-01-05 | End: 2018-01-05

## 2018-01-05 RX ORDER — LABETALOL 300 MG/1
600 TABLET, FILM COATED ORAL EVERY 8 HOURS
Qty: 180 TABLET | Refills: 2 | Status: ON HOLD | OUTPATIENT
Start: 2018-01-05 | End: 2018-01-26 | Stop reason: HOSPADM

## 2018-01-05 RX ORDER — CLONIDINE 0.1 MG/24H
1 PATCH, EXTENDED RELEASE TRANSDERMAL
COMMUNITY
End: 2018-01-22 | Stop reason: DRUGHIGH

## 2018-01-05 RX ORDER — NIFEDIPINE 30 MG/1
30 TABLET, EXTENDED RELEASE ORAL DAILY
Status: DISCONTINUED | OUTPATIENT
Start: 2018-01-06 | End: 2018-01-05 | Stop reason: HOSPADM

## 2018-01-05 RX ORDER — NIFEDIPINE 30 MG/1
30 TABLET, EXTENDED RELEASE ORAL DAILY
Qty: 30 TABLET | Refills: 11 | Status: ON HOLD | OUTPATIENT
Start: 2018-01-05 | End: 2018-01-26

## 2018-01-05 RX ADMIN — MAGNESIUM SULFATE IN WATER 2 G: 40 INJECTION, SOLUTION INTRAVENOUS at 10:01

## 2018-01-05 RX ADMIN — CINACALCET HYDROCHLORIDE 90 MG: 30 TABLET, COATED ORAL at 10:01

## 2018-01-05 RX ADMIN — CLONIDINE HYDROCHLORIDE 0.1 MG: 0.1 TABLET ORAL at 10:01

## 2018-01-05 RX ADMIN — TACROLIMUS 5 MG: 5 CAPSULE ORAL at 10:01

## 2018-01-05 RX ADMIN — NIFEDIPINE 90 MG: 60 TABLET, FILM COATED, EXTENDED RELEASE ORAL at 10:01

## 2018-01-05 NOTE — TELEPHONE ENCOUNTER
Spoke to patient and has appt in [facility] Thursday 1-11-18----patient was discharged today from hospital and still having pain-----she is wanting to know if she can be seen sooner----pls call patient

## 2018-01-05 NOTE — TELEPHONE ENCOUNTER
"Spoke with pt in regards to stomach pain and just getting out of hospital today she states," hospital staff told her nothing is wrong with her and she was okay for discharge for home."   made her aware there is no avail provider before Monday suggest she return to ED or urgent care if pain is not tolerable pt stated she will wait to see provider on appt date given  "

## 2018-01-05 NOTE — SUBJECTIVE & OBJECTIVE
Interval History: patient doing better today; planning on HD; cont abx and follow up cultures    Review of Systems   Constitutional: Negative for chills and fever.   HENT: Negative for rhinorrhea and sore throat.    Eyes: Negative for pain and discharge.   Respiratory: Negative for cough and shortness of breath.    Cardiovascular: Negative for chest pain and leg swelling.   Gastrointestinal: Negative for abdominal distention, abdominal pain, blood in stool, nausea and vomiting.   Endocrine: Negative for cold intolerance and heat intolerance.   Genitourinary: Negative for dysuria and flank pain.   Neurological: Negative for dizziness and numbness.   Psychiatric/Behavioral: Negative for behavioral problems and confusion.     Objective:     Vital Signs (Most Recent):  Temp: 97.5 °F (36.4 °C) (01/04/18 1926)  Pulse: 91 (01/04/18 2021)  Resp: 18 (01/04/18 1926)  BP: 116/61 (01/04/18 2021)  SpO2: 100 % (01/04/18 1926) Vital Signs (24h Range):  Temp:  [97.3 °F (36.3 °C)-98 °F (36.7 °C)] 97.5 °F (36.4 °C)  Pulse:  [] 91  Resp:  [14-18] 18  SpO2:  [97 %-100 %] 100 %  BP: (116-209)/() 116/61     Weight: 57.6 kg (127 lb)  Body mass index is 21.13 kg/m².    Intake/Output Summary (Last 24 hours) at 01/04/18 2120  Last data filed at 01/04/18 1745   Gross per 24 hour   Intake              950 ml   Output             1712 ml   Net             -762 ml      Physical Exam   Constitutional: She is oriented to person, place, and time. She appears well-developed and well-nourished.   HENT:   Head: Normocephalic and atraumatic.   Eyes: Conjunctivae are normal. Pupils are equal, round, and reactive to light.   Neck: Normal range of motion. No thyromegaly present.   Cardiovascular: Normal rate, regular rhythm and normal heart sounds.    Pulmonary/Chest: Effort normal and breath sounds normal.   Abdominal: Soft. She exhibits no distension. There is no tenderness.   Musculoskeletal: Normal range of motion. She exhibits no edema.    Neurological: She is alert and oriented to person, place, and time.   Skin: No erythema. No pallor.       Significant Labs:   CBC:   Recent Labs  Lab 01/03/18 1656 01/04/18 0444   WBC 4.41 3.89*   HGB 8.9* 7.6*   HCT 27.7* 23.2*   PLT 69* 58*     CMP:   Recent Labs  Lab 01/03/18 1656 01/04/18 0444    135*   K 4.9 4.9    103   CO2 20* 17*   GLU 93 134*   BUN 84* 90*   CREATININE 17.5* 17.9*   CALCIUM 8.1* 7.5*   PROT 7.5 6.4   ALBUMIN 2.8* 2.4*   BILITOT 0.6 0.5   ALKPHOS 599* 489*   AST 44* 29   ALT 47* 36   ANIONGAP 15 15   EGFRNONAA 2.4* 2.4*     Coagulation:   Recent Labs  Lab 01/04/18 0444   INR 1.1       Significant Imaging: I have reviewed all pertinent imaging results/findings within the past 24 hours.

## 2018-01-05 NOTE — PROGRESS NOTES
[facility] Hospital Medicine  Progress Note    Patient Name: Holly Patel  MRN: [id number]  Patient Class: IP- Inpatient   Admission Date: 1/3/2018  Length of Stay: 1 days  Attending Physician: Alexander Chicas MD  Primary Care Provider: Stan Sosa MD    [facility] Medicine Team: [facility] HOSP MED L Alexander Chicas MD    Subjective:     Principal Problem:Urinary tract infection without hematuria    HPI:  This is a 27 y.o. Female with a history of a liver transplant ESRD on dialyisis, renovascular HTN, splenomegaly, MRSA bacteremia, chronic rejection of liver transplant, immunosuppressed, secondary hyperparathyroidism, and thrombocytoenia who presents with complaint of lower quadrant abdominal pain and swelling for approximately 7 days. She indicates associated nausea, vomiting, and diarrhea. She denies dysuria or any urinary symptoms. In the ED found to have a UTI and uncontrolled BPs which other mother states has been happening for a long time.      Hospital Course:  No notes on file    Interval History: patient doing better today; planning on HD; cont abx and follow up cultures    Review of Systems   Constitutional: Negative for chills and fever.   HENT: Negative for rhinorrhea and sore throat.    Eyes: Negative for pain and discharge.   Respiratory: Negative for cough and shortness of breath.    Cardiovascular: Negative for chest pain and leg swelling.   Gastrointestinal: Negative for abdominal distention, abdominal pain, blood in stool, nausea and vomiting.   Endocrine: Negative for cold intolerance and heat intolerance.   Genitourinary: Negative for dysuria and flank pain.   Neurological: Negative for dizziness and numbness.   Psychiatric/Behavioral: Negative for behavioral problems and confusion.     Objective:     Vital Signs (Most Recent):  Temp: 97.5 °F (36.4 °C) (01/04/18 1926)  Pulse: 91 (01/04/18 2021)  Resp: 18 (01/04/18 1926)  BP: 116/61 (01/04/18 2021)  SpO2: 100 % (01/04/18  1926) Vital Signs (24h Range):  Temp:  [97.3 °F (36.3 °C)-98 °F (36.7 °C)] 97.5 °F (36.4 °C)  Pulse:  [] 91  Resp:  [14-18] 18  SpO2:  [97 %-100 %] 100 %  BP: (116-209)/() 116/61     Weight: 57.6 kg (127 lb)  Body mass index is 21.13 kg/m².    Intake/Output Summary (Last 24 hours) at 01/04/18 2120  Last data filed at 01/04/18 1745   Gross per 24 hour   Intake              950 ml   Output             1712 ml   Net             -762 ml      Physical Exam   Constitutional: She is oriented to person, place, and time. She appears well-developed and well-nourished.   HENT:   Head: Normocephalic and atraumatic.   Eyes: Conjunctivae are normal. Pupils are equal, round, and reactive to light.   Neck: Normal range of motion. No thyromegaly present.   Cardiovascular: Normal rate, regular rhythm and normal heart sounds.    Pulmonary/Chest: Effort normal and breath sounds normal.   Abdominal: Soft. She exhibits no distension. There is no tenderness.   Musculoskeletal: Normal range of motion. She exhibits no edema.   Neurological: She is alert and oriented to person, place, and time.   Skin: No erythema. No pallor.       Significant Labs:   CBC:   Recent Labs  Lab 01/03/18  1656 01/04/18  0444   WBC 4.41 3.89*   HGB 8.9* 7.6*   HCT 27.7* 23.2*   PLT 69* 58*     CMP:   Recent Labs  Lab 01/03/18  1656 01/04/18  0444    135*   K 4.9 4.9    103   CO2 20* 17*   GLU 93 134*   BUN 84* 90*   CREATININE 17.5* 17.9*   CALCIUM 8.1* 7.5*   PROT 7.5 6.4   ALBUMIN 2.8* 2.4*   BILITOT 0.6 0.5   ALKPHOS 599* 489*   AST 44* 29   ALT 47* 36   ANIONGAP 15 15   EGFRNONAA 2.4* 2.4*     Coagulation:   Recent Labs  Lab 01/04/18  0444   INR 1.1       Significant Imaging: I have reviewed all pertinent imaging results/findings within the past 24 hours.    Assessment/Plan:      * Urinary tract infection without hematuria    - rocephin 1 g daily and follow up urine cultures         Uremia    - intractable N/V; improved; not getting  good HD at home          Moderate protein-calorie malnutrition    - PAB and novasource        Immunosuppressed    - see liver transplant           Thrombocytopenia    - monitor         Anemia in ESRD (end-stage renal disease)    - epo with HD        Renovascular hypertension    - uncontrolled  - restart home labetolol and clonidine; switching norvasc to nifedipine and adding hydralazine and will titrate         ESRD on hemodialysis    - gets home HD  - nephrology consult for HD today        Liver replaced by transplant    - hepatology consult  - cont prograf and prednisone; check daily prograf levels            VTE Risk Mitigation         Ordered     Place LINDA hose  Until discontinued      01/03/18 2002     Place sequential compression device  Until discontinued      01/03/18 2002     Low Risk of VTE  Once      01/03/18 2002              Alexander Chicas MD  Department of Hospital Medicine   [facility]

## 2018-01-05 NOTE — PLAN OF CARE
Problem: Patient Care Overview  Goal: Plan of Care Review  Outcome: Ongoing (interventions implemented as appropriate)  HD treatment complete. Duration of treatment 3 hours and 1 L removed. Treatment was tolerated well and no complications with access to left lower arm. Needles removed and hemostasis achieved. Dressing intact and no drainage noted. Thrill and bruit present.

## 2018-01-05 NOTE — PLAN OF CARE
Problem: Patient Care Overview  Goal: Plan of Care Review  Outcome: Ongoing (interventions implemented as appropriate)  Patient's VSS for shift. Patient is AAOx4, calm, cooperative, and independent. Patient went to dialysis where 1 liter was removed. Many family members at bedside. Patient free from falls, injury, and skin breakdown.

## 2018-01-05 NOTE — NURSING
Morning /72, HR 89. IM L Radha aguilar PA returned. Notified of current VS and 0600 order of 600mg PO labetalol. Radha verified to hold morning dose at this time. WCTM.

## 2018-01-05 NOTE — PLAN OF CARE
Problem: Patient Care Overview  Goal: Plan of Care Review  Outcome: Ongoing (interventions implemented as appropriate)  Pt AAOx4, afebrile, free of falls. Pt's family at bedside throughout shift. Pt tired post HD where 1L was removed. BP low overnight, 2100 BP medications held per MD orders (previous note). IV Rocephin started. No acute pain/distress noted. 1 episode of nausea managed with PRN. WCTM.

## 2018-01-05 NOTE — TELEPHONE ENCOUNTER
----- Message from Carrie Zaidi sent at 1/5/2018  1:20 PM CST -----  Contact: Patient [phone]  Patient is having stomach pain  And would like to speak to you.    Please call and advise.    Thank You

## 2018-01-05 NOTE — NURSING
Notified MD of pt's /61 post HD given 2100 medications are hydralazine and clonidine. MD confirms to hold both at this time. Will recheck BP in one hour and report findings. RAPHAEL.

## 2018-01-06 ENCOUNTER — HOSPITAL ENCOUNTER (EMERGENCY)
Facility: HOSPITAL | Age: 28
Discharge: HOME | End: 2018-01-07
Attending: EMERGENCY MEDICINE
Payer: MEDICARE

## 2018-01-06 DIAGNOSIS — R10.13 EPIGASTRIC PAIN: Primary | ICD-10-CM

## 2018-01-06 DIAGNOSIS — R11.2 NON-INTRACTABLE VOMITING WITH NAUSEA, UNSPECIFIED VOMITING TYPE: ICD-10-CM

## 2018-01-06 LAB
ALBUMIN SERPL BCP-MCNC: 2.6 G/DL
ALP SERPL-CCNC: 581 U/L
ALT SERPL W/O P-5'-P-CCNC: 34 U/L
ANION GAP SERPL CALC-SCNC: 14 MMOL/L
AST SERPL-CCNC: 37 U/L
BASOPHILS # BLD AUTO: 0.02 K/UL
BASOPHILS NFR BLD: 0.4 %
BILIRUB SERPL-MCNC: 0.5 MG/DL
BUN SERPL-MCNC: 38 MG/DL
CALCIUM SERPL-MCNC: 7.1 MG/DL
CHLORIDE SERPL-SCNC: 101 MMOL/L
CO2 SERPL-SCNC: 22 MMOL/L
CREAT SERPL-MCNC: 12.2 MG/DL
DIFFERENTIAL METHOD: ABNORMAL
EOSINOPHIL # BLD AUTO: 0.3 K/UL
EOSINOPHIL NFR BLD: 6.6 %
ERYTHROCYTE [DISTWIDTH] IN BLOOD BY AUTOMATED COUNT: 16.2 %
EST. GFR  (AFRICAN AMERICAN): 4.3 ML/MIN/1.73 M^2
EST. GFR  (NON AFRICAN AMERICAN): 3.8 ML/MIN/1.73 M^2
GLUCOSE SERPL-MCNC: 93 MG/DL
HCT VFR BLD AUTO: 28.5 %
HGB BLD-MCNC: 9.2 G/DL
IMM GRANULOCYTES # BLD AUTO: 0.09 K/UL
IMM GRANULOCYTES NFR BLD AUTO: 2 %
LIPASE SERPL-CCNC: 37 U/L
LYMPHOCYTES # BLD AUTO: 1.1 K/UL
LYMPHOCYTES NFR BLD: 24.2 %
MCH RBC QN AUTO: 25.8 PG
MCHC RBC AUTO-ENTMCNC: 32.3 G/DL
MCV RBC AUTO: 80 FL
MONOCYTES # BLD AUTO: 0.3 K/UL
MONOCYTES NFR BLD: 6.6 %
NEUTROPHILS # BLD AUTO: 2.8 K/UL
NEUTROPHILS NFR BLD: 60.2 %
NRBC BLD-RTO: 1 /100 WBC
PLATELET # BLD AUTO: 89 K/UL
PMV BLD AUTO: 9.3 FL
POTASSIUM SERPL-SCNC: 4.4 MMOL/L
PROT SERPL-MCNC: 7.1 G/DL
RBC # BLD AUTO: 3.57 M/UL
SODIUM SERPL-SCNC: 137 MMOL/L
WBC # BLD AUTO: 4.58 K/UL

## 2018-01-06 PROCEDURE — 83690 ASSAY OF LIPASE: CPT

## 2018-01-06 PROCEDURE — 99283 EMERGENCY DEPT VISIT LOW MDM: CPT | Mod: 25

## 2018-01-06 PROCEDURE — 80053 COMPREHEN METABOLIC PANEL: CPT

## 2018-01-06 PROCEDURE — 99284 EMERGENCY DEPT VISIT MOD MDM: CPT | Mod: ,,, | Performed by: EMERGENCY MEDICINE

## 2018-01-06 PROCEDURE — 63600175 PHARM REV CODE 636 W HCPCS: Performed by: EMERGENCY MEDICINE

## 2018-01-06 PROCEDURE — 96374 THER/PROPH/DIAG INJ IV PUSH: CPT

## 2018-01-06 PROCEDURE — 25000003 PHARM REV CODE 250: Performed by: EMERGENCY MEDICINE

## 2018-01-06 PROCEDURE — 85025 COMPLETE CBC W/AUTO DIFF WBC: CPT

## 2018-01-06 RX ORDER — ONDANSETRON 2 MG/ML
8 INJECTION INTRAMUSCULAR; INTRAVENOUS
Status: COMPLETED | OUTPATIENT
Start: 2018-01-06 | End: 2018-01-06

## 2018-01-06 RX ADMIN — ALUMINUM HYDROXIDE, MAGNESIUM HYDROXIDE, AND SIMETHICONE 50 ML: 200; 200; 20 SUSPENSION ORAL at 10:01

## 2018-01-06 RX ADMIN — ONDANSETRON 8 MG: 2 INJECTION INTRAMUSCULAR; INTRAVENOUS at 11:01

## 2018-01-06 NOTE — SUBJECTIVE & OBJECTIVE
Interval History: patient doing better today; BP better controlled on new medications; d/c home with abx    Review of Systems   Constitutional: Negative for chills and fever.   HENT: Negative for rhinorrhea and sore throat.    Eyes: Negative for pain and discharge.   Respiratory: Negative for cough and shortness of breath.    Cardiovascular: Negative for chest pain and leg swelling.   Gastrointestinal: Negative for abdominal distention, abdominal pain, blood in stool, nausea and vomiting.   Endocrine: Negative for cold intolerance and heat intolerance.   Genitourinary: Negative for dysuria and flank pain.   Neurological: Negative for dizziness and numbness.   Psychiatric/Behavioral: Negative for behavioral problems and confusion.     Objective:     Vital Signs (Most Recent):  Temp: 98.7 °F (37.1 °C) (01/05/18 1131)  Pulse: 86 (01/05/18 1131)  Resp: 18 (01/05/18 1131)  BP: 131/77 (01/05/18 1131)  SpO2: 97 % (01/05/18 1131) Vital Signs (24h Range):        Weight: 57.6 kg (127 lb)  Body mass index is 21.13 kg/m².  No intake or output data in the 24 hours ending 01/06/18 1606   Physical Exam   Constitutional: She is oriented to person, place, and time. She appears well-developed and well-nourished.   HENT:   Head: Normocephalic and atraumatic.   Eyes: Conjunctivae are normal. Pupils are equal, round, and reactive to light.   Neck: Normal range of motion. No thyromegaly present.   Cardiovascular: Normal rate, regular rhythm and normal heart sounds.    Pulmonary/Chest: Effort normal and breath sounds normal.   Abdominal: Soft. She exhibits no distension. There is no tenderness.   Musculoskeletal: Normal range of motion. She exhibits no edema.   Neurological: She is alert and oriented to person, place, and time.   Skin: No erythema. No pallor.       Significant Labs:   CBC:     Recent Labs  Lab 01/05/18  0408   WBC 3.38*   HGB 8.0*   HCT 24.6*   PLT 80*     CMP:     Recent Labs  Lab 01/05/18  0408      K 3.9       CO2 27   GLU 82   BUN 30*   CREATININE 8.9*   CALCIUM 7.1*   PROT 6.0   ALBUMIN 2.2*   BILITOT 0.4   ALKPHOS 455*   AST 23   ALT 29   ANIONGAP 8   EGFRNONAA 5.5*     Coagulation: No results for input(s): PT, INR, APTT in the last 48 hours.    Significant Imaging: I have reviewed all pertinent imaging results/findings within the past 24 hours.

## 2018-01-06 NOTE — DISCHARGE SUMMARY
[facility] Hospital Medicine  Discharge Summary      Patient Name: Holly Patel  MRN: [id number]  Admission Date: 1/3/2018  Hospital Length of Stay: 2 days  Discharge Date and Time: 1/5/18  Attending Physician: No att. providers found   Discharging Provider: Alexander Chicas MD  Primary Care Provider: Stan Sosa MD  Hospital Medicine Team: [facility] HOSP MED L Alexander Chicas MD    HPI:   This is a 27 y.o. Female with a history of a liver transplant ESRD on dialyisis, renovascular HTN, splenomegaly, MRSA bacteremia, chronic rejection of liver transplant, immunosuppressed, secondary hyperparathyroidism, and thrombocytoenia who presents with complaint of lower quadrant abdominal pain and swelling for approximately 7 days. She indicates associated nausea, vomiting, and diarrhea. She denies dysuria or any urinary symptoms. In the ED found to have a UTI and uncontrolled BPs which other mother states has been happening for a long time.      * No surgery found *      Hospital Course:   * Urinary tract infection without hematuria     - rocephin 1 g daily and follow up urine cultures; sending home on augmentin for 5 days based on previous urine cultures         Uremia     - intractable N/V; improved with HD;           Moderate protein-calorie malnutrition     - PAB and novasource       Immunosuppressed     - see liver transplant           Thrombocytopenia     - monitor        Anemia in ESRD (end-stage renal disease)     - epo with HD       Renovascular hypertension     - uncontrolled  - restart home labetolol and clonidine; switching norvasc to nifedipine and adding hydralazine and will titrate        ESRD on hemodialysis     - gets home HD  - nephrology consult s/p HD yesterday; cont home HD       Liver replaced by transplant     - hepatology consult  - cont prograf and prednisone; check daily prograf levels                  Consults:   Consults         Status Ordering Provider     Inpatient consult  to Hepatology  Once     Provider:  (Not yet assigned)    Completed REBECCA ISRAEL     Inpatient consult to Nephrology  Once     Provider:  (Not yet assigned)    Completed REBECCA ISRAEL          No new Assessment & Plan notes have been filed under this hospital service since the last note was generated.  Service: Hospital Medicine    Final Active Diagnoses:    Diagnosis Date Noted POA    PRINCIPAL PROBLEM:  Urinary tract infection without hematuria [N39.0] 01/03/2018 Yes    Uremia [N19] 01/04/2018 Yes    Moderate protein-calorie malnutrition [E44.0] 08/16/2017 Yes    Immunosuppressed [D89.9] 08/05/2017 Yes    Thrombocytopenia [D69.6] 04/12/2016 Yes    Anemia in ESRD (end-stage renal disease) [N18.6, D63.1] 10/12/2015 Yes     Chronic    Renovascular hypertension [I15.0] 10/02/2015 Yes    ESRD on hemodialysis [N18.6, Z99.2] 09/30/2015 Not Applicable     Chronic   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 [Z94.4] 09/10/2012 Not Applicable     Chronic      Problems Resolved During this Admission:    Diagnosis Date Noted Date Resolved POA       Discharged Condition: good    Disposition: Home or Self Care    Follow Up:  Follow-up Information     Stan Sosa MD.    Specialty:  Internal Medicine  Contact information:  [address]  [place] [id number] [phone]             [facility] On 1/11/2018.    Specialty:  Priority [facility]  Why:  3:00pm  Primary Care hospital discharge follow up  Contact information:  [address] [facility] [address] [phone]  Additional information:  [facility]               Patient Instructions:   No discharge procedures on file.         Pending Diagnostic Studies:     None         Medications:  Reconciled Home Medications:   Discharge Medication List as of 1/5/2018  1:04 PM      START taking these medications    Details   amoxicillin-clavulanate 500-125mg (AUGMENTIN) 500-125 mg Tab Take 1 tablet  (500 mg total) by mouth once daily., Starting Fri 1/5/2018, Normal      NIFEdipine (PROCARDIA-XL) 30 MG (OSM) 24 hr tablet Take 1 tablet (30 mg total) by mouth once daily., Starting Fri 1/5/2018, Until Sat 1/5/2019, Normal         CONTINUE these medications which have CHANGED    Details   labetalol (NORMODYNE) 300 MG tablet Take 2 tablets (600 mg total) by mouth every 8 (eight) hours., Starting Fri 1/5/2018, Normal         CONTINUE these medications which have NOT CHANGED    Details   cinacalcet (SENSIPAR) 90 MG Tab Take 1 tablet (90 mg total) by mouth daily with breakfast., Starting Wed 12/27/2017, Normal      cloNIDine 0.1 mg/24 hr td ptwk (CATAPRES) 0.1 mg/24 hr Place 1 patch onto the skin every 7 days. Patient changes on Friday, Historical Med      food supplemt, lactose-reduced (ENSURE ACTIVE HIGH PROTEIN) Liqd Take 236 mLs by mouth 2 (two) times daily., Starting Wed 8/16/2017, Normal      predniSONE (DELTASONE) 1 MG tablet Take 1 mg by mouth every other day., Until Discontinued, Historical Med      tacrolimus (PROGRAF) 1 MG Cap Take 5 capsules (5 mg total) by mouth every 12 (twelve) hours., Starting Wed 8/30/2017, Until Thu 8/30/2018, Normal      triamcinolone acetonide 0.1% (KENALOG) 0.1 % ointment AAA on arms, legs, and neck bid x 1-2 wks then prn flares only, Normal         STOP taking these medications       amlodipine (NORVASC) 10 MG tablet Comments:   Reason for Stopping:         cloNIDine (CATAPRES) 0.1 MG tablet Comments:   Reason for Stopping:               Indwelling Lines/Drains at time of discharge:   Lines/Drains/Airways     Drain                 Hemodialysis AV Fistula Left forearm -- days                Time spent on the discharge of patient: 33 minutes  Patient was seen and examined on the date of discharge and determined to be suitable for discharge.         Alexander Chicas MD  Department of Hospital Medicine  [facility]

## 2018-01-06 NOTE — PROGRESS NOTES
[facility] Hospital Medicine  Progress Note    Patient Name: Holly Patel  MRN: [id number]  Patient Class: IP- Inpatient   Admission Date: 1/3/2018  Length of Stay: 2 days  Attending Physician: No att. providers found  Primary Care Provider: Stan Sosa MD    [facility] Medicine Team: [facility] MED L Alexander Chicas MD    Subjective:     Principal Problem:Urinary tract infection without hematuria    HPI:  This is a 27 y.o. Female with a history of a liver transplant ESRD on dialyisis, renovascular HTN, splenomegaly, MRSA bacteremia, chronic rejection of liver transplant, immunosuppressed, secondary hyperparathyroidism, and thrombocytoenia who presents with complaint of lower quadrant abdominal pain and swelling for approximately 7 days. She indicates associated nausea, vomiting, and diarrhea. She denies dysuria or any urinary symptoms. In the ED found to have a UTI and uncontrolled BPs which other mother states has been happening for a long time.      Hospital Course:  No notes on file    Interval History: patient doing better today; BP better controlled on new medications; d/c home with abx    Review of Systems   Constitutional: Negative for chills and fever.   HENT: Negative for rhinorrhea and sore throat.    Eyes: Negative for pain and discharge.   Respiratory: Negative for cough and shortness of breath.    Cardiovascular: Negative for chest pain and leg swelling.   Gastrointestinal: Negative for abdominal distention, abdominal pain, blood in stool, nausea and vomiting.   Endocrine: Negative for cold intolerance and heat intolerance.   Genitourinary: Negative for dysuria and flank pain.   Neurological: Negative for dizziness and numbness.   Psychiatric/Behavioral: Negative for behavioral problems and confusion.     Objective:     Vital Signs (Most Recent):  Temp: 98.7 °F (37.1 °C) (01/05/18 1131)  Pulse: 86 (01/05/18 1131)  Resp: 18 (01/05/18 1131)  BP: 131/77 (01/05/18 1131)  SpO2: 97  % (01/05/18 1131) Vital Signs (24h Range):        Weight: 57.6 kg (127 lb)  Body mass index is 21.13 kg/m².  No intake or output data in the 24 hours ending 01/06/18 1606   Physical Exam   Constitutional: She is oriented to person, place, and time. She appears well-developed and well-nourished.   HENT:   Head: Normocephalic and atraumatic.   Eyes: Conjunctivae are normal. Pupils are equal, round, and reactive to light.   Neck: Normal range of motion. No thyromegaly present.   Cardiovascular: Normal rate, regular rhythm and normal heart sounds.    Pulmonary/Chest: Effort normal and breath sounds normal.   Abdominal: Soft. She exhibits no distension. There is no tenderness.   Musculoskeletal: Normal range of motion. She exhibits no edema.   Neurological: She is alert and oriented to person, place, and time.   Skin: No erythema. No pallor.       Significant Labs:   CBC:     Recent Labs  Lab 01/05/18  0408   WBC 3.38*   HGB 8.0*   HCT 24.6*   PLT 80*     CMP:     Recent Labs  Lab 01/05/18  0408      K 3.9      CO2 27   GLU 82   BUN 30*   CREATININE 8.9*   CALCIUM 7.1*   PROT 6.0   ALBUMIN 2.2*   BILITOT 0.4   ALKPHOS 455*   AST 23   ALT 29   ANIONGAP 8   EGFRNONAA 5.5*     Coagulation: No results for input(s): PT, INR, APTT in the last 48 hours.    Significant Imaging: I have reviewed all pertinent imaging results/findings within the past 24 hours.    Assessment/Plan:      * Urinary tract infection without hematuria    - rocephin 1 g daily and follow up urine cultures; sending home on augmentin for 5 days based on previous urine cultures          Uremia    - intractable N/V; improved with HD;           Moderate protein-calorie malnutrition    - PAB and novasource        Immunosuppressed    - see liver transplant           Thrombocytopenia    - monitor         Anemia in ESRD (end-stage renal disease)    - epo with HD        Renovascular hypertension    - uncontrolled  - restart home labetolol and clonidine;  switching norvasc to nifedipine and adding hydralazine and will titrate         ESRD on hemodialysis    - gets home HD  - nephrology consult s/p HD yesterday; cont home HD        Liver replaced by transplant    - hepatology consult  - cont prograf and prednisone; check daily prograf levels            VTE Risk Mitigation         Ordered     Low Risk of VTE  Once      01/03/18 2002              Alexander Chicas MD  Department of Hospital Medicine   [facility]

## 2018-01-06 NOTE — HOSPITAL COURSE
* Urinary tract infection without hematuria     - rocephin 1 g daily and follow up urine cultures; sending home on augmentin for 5 days based on previous urine cultures         Uremia     - intractable N/V; improved with HD;           Moderate protein-calorie malnutrition     - PAB and novasource       Immunosuppressed     - see liver transplant           Thrombocytopenia     - monitor        Anemia in ESRD (end-stage renal disease)     - epo with HD       Renovascular hypertension     - uncontrolled  - restart home labetolol and clonidine; switching norvasc to nifedipine and adding hydralazine and will titrate        ESRD on hemodialysis     - gets home HD  - nephrology consult s/p HD yesterday; cont home HD       Liver replaced by transplant     - hepatology consult  - cont prograf and prednisone; check daily prograf levels

## 2018-01-06 NOTE — ASSESSMENT & PLAN NOTE
- rocephin 1 g daily and follow up urine cultures; sending home on augmentin for 5 days based on previous urine cultures

## 2018-01-07 VITALS
DIASTOLIC BLOOD PRESSURE: 95 MMHG | BODY MASS INDEX: 21.16 KG/M2 | RESPIRATION RATE: 17 BRPM | TEMPERATURE: 98 F | HEIGHT: 65 IN | HEART RATE: 63 BPM | WEIGHT: 127 LBS | OXYGEN SATURATION: 98 % | SYSTOLIC BLOOD PRESSURE: 145 MMHG

## 2018-01-07 PROCEDURE — 25000003 PHARM REV CODE 250: Performed by: EMERGENCY MEDICINE

## 2018-01-07 RX ORDER — FAMOTIDINE 20 MG/1
20 TABLET, FILM COATED ORAL
Status: COMPLETED | OUTPATIENT
Start: 2018-01-07 | End: 2018-01-07

## 2018-01-07 RX ORDER — DICYCLOMINE HYDROCHLORIDE 20 MG/1
20 TABLET ORAL 2 TIMES DAILY
Qty: 20 TABLET | Refills: 0 | Status: SHIPPED | OUTPATIENT
Start: 2018-01-07 | End: 2018-02-06

## 2018-01-07 RX ORDER — DICYCLOMINE HYDROCHLORIDE 10 MG/1
20 CAPSULE ORAL
Status: COMPLETED | OUTPATIENT
Start: 2018-01-07 | End: 2018-01-07

## 2018-01-07 RX ORDER — ONDANSETRON 4 MG/1
4 TABLET, FILM COATED ORAL EVERY 6 HOURS
Qty: 12 TABLET | Refills: 0 | Status: SHIPPED | OUTPATIENT
Start: 2018-01-07 | End: 2018-04-25 | Stop reason: SDUPTHER

## 2018-01-07 RX ORDER — FAMOTIDINE 20 MG/1
20 TABLET, FILM COATED ORAL 2 TIMES DAILY
Qty: 20 TABLET | Refills: 0 | Status: ON HOLD | OUTPATIENT
Start: 2018-01-07 | End: 2018-05-30

## 2018-01-07 RX ADMIN — FAMOTIDINE 20 MG: 20 TABLET, FILM COATED ORAL at 12:01

## 2018-01-07 RX ADMIN — DICYCLOMINE HYDROCHLORIDE 20 MG: 10 CAPSULE ORAL at 12:01

## 2018-01-07 NOTE — ED PROVIDER NOTES
Encounter Date: 1/6/2018    SCRIBE #1 NOTE: I, Shima Fields, am scribing for, and in the presence of,  Dr. Jeff. I have scribed the following portions of the note - the Resident attestation.       History     Chief Complaint   Patient presents with    Abdominal Pain     abdominal pain, bloating, just released yesterday     HPI     27-year-old female presents to the emergency department due to abdominal pain.  Patient states that she's been having some upper epigastric type abdominal pain for the last few days.  Patient is unable to state exactly when her pain started.  However notes that she was having pain like this when she presented to the hospital a few days ago.  She was recently discharged from this hospital secondary to a urinary tract infection.  She was not given any pain medication upon discharge and states that she has been having pain.  Currently denies any nausea, vomiting, constipation, diarrhea, or dysuria.  States that she's been taking off her antibiotics.  Pain is sharp and constant.  Patient has no radiation.  Patient denies having any exacerbating or relieving factors for her pain.    Review of patient's allergies indicates:   Allergen Reactions    Chloral hydrate      Other reaction(s): Hallucinations  Other reaction(s): Hives    Hydrocodone Other (See Comments)     Mental status changes     Past Medical History:   Diagnosis Date    Anemia in ESRD (end-stage renal disease) 10/12/2015    Chronic rejection of liver transplant 3/22/2016    ESRD on hemodialysis 9/30/2015    History of splenomegaly 4/12/2016    Immunosuppressed 8/5/2017    Iron deficiency anemia secondary to inadequate dietary iron intake 8/16/2017    She [facility].   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)    Moderate protein-calorie malnutrition 8/16/2017    MRSA bacteremia 8/6/2017    Prophylactic immunotherapy 8/4/2014     Renovascular hypertension 10/2/2015    Secondary hyperparathyroidism 2017    Thrombocytopenia 2016     Past Surgical History:   Procedure Laterality Date     SECTION      2     KIDNEY TRANSPLANT      LIVER BIOPSY      LIVER TRANSPLANT  1992    TUBAL LIGATION       Family History   Problem Relation Age of Onset    Hypertension Mother     Hypertension Father     Melanoma Neg Hx      Social History   Substance Use Topics    Smoking status: Never Smoker    Smokeless tobacco: Never Used    Alcohol use No     Review of Systems   Constitutional: Negative for activity change and appetite change.   HENT: Negative for congestion and ear pain.    Eyes: Negative for pain and redness.   Respiratory: Negative for cough and shortness of breath.    Cardiovascular: Negative for chest pain and leg swelling.   Gastrointestinal: Positive for abdominal pain. Negative for abdominal distention.   Genitourinary: Negative for difficulty urinating and dysuria.   Musculoskeletal: Negative for arthralgias and back pain.   Skin: Negative for color change and pallor.   Neurological: Negative for light-headedness and headaches.   All other systems reviewed and are negative.      Physical Exam     Initial Vitals [18 1834]   BP Pulse Resp Temp SpO2   (!) 177/115 90 16 98 °F (36.7 °C) 100 %      MAP       135.67         Physical Exam    Nursing note and vitals reviewed.  Constitutional: She appears well-developed and well-nourished. She is not diaphoretic. No distress.   HENT:   Head: Normocephalic and atraumatic.   Right Ear: External ear normal.   Left Ear: External ear normal.   Mouth/Throat: No oropharyngeal exudate.   Eyes: Conjunctivae and EOM are normal. Pupils are equal, round, and reactive to light.   Neck: Normal range of motion. Neck supple. No JVD present.   Cardiovascular: Normal rate, regular rhythm, normal heart sounds and intact distal pulses. Exam reveals no friction rub.    No murmur  heard.  Pulmonary/Chest: Breath sounds normal. No stridor. No respiratory distress. She has no wheezes. She has no rales.   Abdominal: Soft. She exhibits no distension. There is tenderness (mild epigastric tenderness). There is no rebound.   Musculoskeletal: Normal range of motion. She exhibits no edema or tenderness.   Neurological: She is alert and oriented to person, place, and time. She has normal strength.   Skin: No rash and no abscess noted. No erythema. No pallor.         ED Course   Procedures  Labs Reviewed   CBC W/ AUTO DIFFERENTIAL - Abnormal; Notable for the following:        Result Value    RBC 3.57 (*)     Hemoglobin 9.2 (*)     Hematocrit 28.5 (*)     MCV 80 (*)     MCH 25.8 (*)     RDW 16.2 (*)     Platelets 89 (*)     Immature Granulocytes 2.0 (*)     Immature Grans (Abs) 0.09 (*)     nRBC 1 (*)     All other components within normal limits   COMPREHENSIVE METABOLIC PANEL - Abnormal; Notable for the following:     CO2 22 (*)     BUN, Bld 38 (*)     Creatinine 12.2 (*)     Calcium 7.1 (*)     Albumin 2.6 (*)     Alkaline Phosphatase 581 (*)     eGFR if  4.3 (*)     eGFR if non  3.8 (*)     All other components within normal limits   LIPASE           HOIII MDM  A/P: 27-year-old with abdominal pain.  Differential diagnosis includes but is not limited to gastritis, pancreatitis, GERD.  Workup CBC, CMP, lipase.  Give trial of GI cocktail and reassess her symptoms.  Patient has known UTI, currently taking antibiotics for it.  Rosales Liu PGY-3 [facility]  01/07/2018 10:55 PM    upDate -   Results Significant for no leukocytosis, H/H 9.2/20.5, sodium 137, potassium 4.4, creatinine 12.2 (baseline around 17), BUN 38.  Patient's pain has gotten better after Bentyl and Pepcid.  Patient had one episode of nausea and vomiting after GI cocktail.  However after Zofran and by mouth fluids she has not had another episode of vomiting.  Patient likely has GERD.  We'll discharge with  Zofran and Pepcid and Bentyl.  We'll have the patient follow-up with her primary care provider.  Patient agrees to the plan.  Rosales ElliottRandalRobert PGY-3 [facility]  01/07/2018 2:35 AM       Medical Decision Making:   History:   Old Medical Records: I decided to obtain old medical records.  Clinical Tests:   Lab Tests: Ordered and Reviewed            Scribe Attestation:   Scribe #1: I performed the above scribed service and the documentation accurately describes the services I performed. I attest to the accuracy of the note.    Attending Attestation:   Physician Attestation Statement for Resident:  As the supervising MD   Physician Attestation Statement: I have personally seen and examined this patient.   I agree with the above history. -: 27 year old female presenting with 3 days of epigastric pain worsened by eating. Negative CT scan 2 days prior. Abdomen with mild tenderness, no guarding or rebound. Labs without significant abnormalities. Pt well appearing. Reports sx improved s/p Zofran, bentyl, and Pepcid. She is able to tolerate PO. Pt provided with extensive return precautions. I see no emergent surgical etiology to the pain at this time.    As the supervising MD I agree with the above PE.    As the supervising MD I agree with the above treatment, course, plan, and disposition.  I have reviewed and agree with the residents interpretation of the following: lab data.  I have reviewed the following: old records at this facility.          Physician Attestation for Scribe:      Comments: I, Dr. Farooq Jeff, personally performed the services described in this documentation. All medical record entries made by the scribe were at my direction and in my presence.  I have reviewed the chart and agree that the record reflects my personal performance and is accurate and complete. Farooq Jeff MD.  4:39 AM 01/07/2018              ED Course      Clinical Impression:   The primary encounter diagnosis was Epigastric pain. A  diagnosis of Non-intractable vomiting with nausea, unspecified vomiting type was also pertinent to this visit.    Disposition:   Disposition: Discharged  Condition: Stable                        Farooq Jeff MD  01/07/18 0439

## 2018-01-07 NOTE — ED NOTES
Patient able to hold water down, and states she is not nauseated at this time. Dr. Jones notified.

## 2018-01-07 NOTE — ED NOTES
Holly Patel, a 27 y.o. female presents to the ED intake 1      Chief Complaint   Patient presents with    Abdominal Pain     abdominal pain, bloating, just released yesterday     Pt was seen in ER on 1/3 and was admitted for same complaint. Diagnosed with UTI; pt taking amoxicillin at home. Pt received dialysis yesterday while here. Pt states that she was still having abd pain but was sent home. Denies N/V/D. Last BM this morning. Pt states that CT abd and pelvis was normal.       Review of patient's allergies indicates:   Allergen Reactions    Chloral hydrate      Other reaction(s): Hallucinations  Other reaction(s): Hives    Hydrocodone Other (See Comments)     Mental status changes     Past Medical History:   Diagnosis Date    Anemia in ESRD (end-stage renal disease) 10/12/2015    Chronic rejection of liver transplant 3/22/2016    ESRD on hemodialysis 9/30/2015    History of splenomegaly 4/12/2016    Immunosuppressed 8/5/2017    Iron deficiency anemia secondary to inadequate dietary iron intake 8/16/2017    She [facility] iron [facility] the [facility].   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

## 2018-01-07 NOTE — ED NOTES
Pt identifiers Holly Patel checked and correct  LOC: The patient is awake, alert, aware of environment with an appropriate affect. Oriented x3, speaking appropriately  APPEARANCE: Pt resting comfortably, in no acute distress, pt is clean and well groomed, clothing properly fastened  SKIN: Skin warm, dry and intact, normal skin turgor, moist mucus membranes  RESPIRATORY: Airway is open and patent, respirations are spontaneous, even and unlabored, normal effort and rate  CARDIAC: Normal rate and rhythm, no peripheral edema noted, capillary refill < 3 seconds, bilateral radial pulses 2+  ABDOMEN: +firm, tender, distended. Bowel sounds present. +generalized abd pain. LBM today and normal  NEUROLOGIC: PERRL, facial expression is symmetrical, patient moving all extremities spontaneously, normal sensation in all extremities when touched with a finger.  Follows all commands appropriately  MUSCULOSKELETAL: No obvious deformities.

## 2018-01-09 ENCOUNTER — PATIENT OUTREACH (OUTPATIENT)
Dept: ADMINISTRATIVE | Facility: CLINIC | Age: 28
End: 2018-01-09

## 2018-01-09 DIAGNOSIS — N39.0 UTI (URINARY TRACT INFECTION), UNCOMPLICATED: Primary | ICD-10-CM

## 2018-01-09 NOTE — PATIENT INSTRUCTIONS
Urinary Tract Infections in Women    Urinary tract infections (UTIs) are most often caused by bacteria (germs). These bacteria enter the urinary tract. The bacteria may come from outside the body. Or they may travel from the skin outside the rectum or vagina into the urethra. Female anatomy makes it easy for bacteria from the bowel to enter a womans urinary tract, which is the most common source of UTI. This means women develop UTIs more often than men. Pain in or around the urinary tract is a common UTI symptom. But the only way to know for sure if you have a UTI for the healthcare provider to test your urine. The two tests that may be done are the urinalysis and urine culture.  Types of UTIs  · Cystitis: A bladder infection (cystitis) is the most common UTI in women. You may have urgent or frequent urination. You may also have pain, burning when you urinate, and bloody urine.  · Urethritis: This is an inflamed urethra, which is the tube that carries urine from the bladder to outside the body. You may have lower stomach or back pain. You may also have urgent or frequent urination.  · Pyelonephritis: This is a kidney infection. If not treated, it can be serious and damage your kidneys. In severe cases, you may be hospitalized. You may have a fever and lower back pain.  Medicines to treat a UTI  Most UTIs are treated with antibiotics. These kill the bacteria. The length of time you need to take them depends on the type of infection. It may be as short as 3 days. If you have repeated UTIs, a low-dose antibiotic may be needed for several months. Take antibiotics exactly as directed. Dont stop taking them until all of the medicine is gone. If you stop taking the antibiotic too soon, the infection may not go away, and you may develop a resistance to the antibiotic. This can make it much harder to treat.  Lifestyle changes to treat and prevent UTIs  The lifestyle changes below will help get rid of your UTI. They may  also help prevent future UTIs.  · Drink plenty of fluids. This includes water, juice, or other caffeine-free drinks. Fluids help flush bacteria out of your body.  · Empty your bladder. Always empty your bladder when you feel the urge to urinate. And always urinate before going to sleep. Urine that stays in your bladder can lead to infection. Try to urinate before and after sex as well.  · Practice good personal hygiene. Wipe yourself from front to back after using the toilet. This helps keep bacteria from getting into the urethra.  · Use condoms during sex. These help prevent UTIs caused by sexually transmitted bacteria. Also, avoid using spermicides during sex. These can increase the risk of UTIs. Choose other forms of birth control instead. For women who tend to get UTIs after sex, a low-dose of a preventive antibiotic may be used. Be sure to discuss this option with your healthcare provider.  · Follow up with your healthcare provider as directed. He or she may test to make sure the infection has cleared. If needed, more treatment may be started.  Date Last Reviewed: 1/1/2017  © 3155-0225 [employer]. [address] [place] [address], [place] [address]. All rights reserved. This information is not intended as a substitute for professional medical care. Always follow your healthcare professional's instructions.

## 2018-01-09 NOTE — PLAN OF CARE
01/05/18 1252   Final Note   Assessment Type Final Discharge Note   Discharge Disposition Home  (patient completes home dialysis)   What phone number can be called within the next 1-3 days to see how you are doing after discharge? [phone]   Hospital Follow Up  Appt(s) scheduled? Yes   Discharge plans and expectations educations in teach back method with documentation complete? Yes     Future Appointments  Date Time Provider Department Center   1/11/2018 3:00 PM SAM Huerta [facility] PCW   1/12/2018 8:30 AM [facility] LAB [place]   1/17/2018 1:20 PM Viktor Bass MD [facility] NEPHRO [place]   2/27/2018 3:20 PM Stan Sosa MD [facility] PCW   3/7/2018 1:40 PM Stan Sosa MD [facility] Herm[facility] [place] PCW     Follow-up With  Details  Why  Contact Info   Stan Sosa MD      [address]  [facility] [id number]  [phone]   [facility] Loa[facility]  On 1/11/2018  3:00pm Primary Care hospital discharge follow up  [address]  [facility] [id number] [phone]

## 2018-01-10 ENCOUNTER — OUTPATIENT CASE MANAGEMENT (OUTPATIENT)
Dept: ADMINISTRATIVE | Facility: OTHER | Age: 28
End: 2018-01-10

## 2018-01-10 NOTE — PROGRESS NOTES
Thank you for the referral.  Patient has been assigned to MARYCHUY Jamil for low risk screening for Outpatient Case Management.     Reason for referral: UTI (urinary tract infection), uncomplicated    Please contact [facility] at ext. [phone] with any questions.    Thank you,    Nano Hernandez

## 2018-01-11 ENCOUNTER — OFFICE VISIT (OUTPATIENT)
Dept: PRIMARY CARE CLINIC | Facility: CLINIC | Age: 28
End: 2018-01-11
Payer: MEDICARE

## 2018-01-11 ENCOUNTER — OUTPATIENT CASE MANAGEMENT (OUTPATIENT)
Dept: ADMINISTRATIVE | Facility: OTHER | Age: 28
End: 2018-01-11

## 2018-01-11 VITALS
HEIGHT: 65 IN | DIASTOLIC BLOOD PRESSURE: 80 MMHG | BODY MASS INDEX: 20.9 KG/M2 | OXYGEN SATURATION: 98 % | HEART RATE: 93 BPM | WEIGHT: 125.44 LBS | SYSTOLIC BLOOD PRESSURE: 168 MMHG

## 2018-01-11 DIAGNOSIS — N18.6 ESRD ON HEMODIALYSIS: Chronic | ICD-10-CM

## 2018-01-11 DIAGNOSIS — I10 UNCONTROLLED HYPERTENSION: ICD-10-CM

## 2018-01-11 DIAGNOSIS — Z99.2 ESRD ON HEMODIALYSIS: Chronic | ICD-10-CM

## 2018-01-11 DIAGNOSIS — Z94.4 LIVER REPLACED BY TRANSPLANT: Chronic | ICD-10-CM

## 2018-01-11 DIAGNOSIS — I15.0 RENOVASCULAR HYPERTENSION: ICD-10-CM

## 2018-01-11 DIAGNOSIS — N30.00 ACUTE CYSTITIS WITHOUT HEMATURIA: Primary | ICD-10-CM

## 2018-01-11 PROBLEM — B95.62 MRSA BACTEREMIA: Status: RESOLVED | Noted: 2017-08-06 | Resolved: 2018-01-11

## 2018-01-11 PROBLEM — R78.81 MRSA BACTEREMIA: Status: RESOLVED | Noted: 2017-08-06 | Resolved: 2018-01-11

## 2018-01-11 LAB
BACTERIA #/AREA URNS AUTO: ABNORMAL /HPF
BILIRUB UR QL STRIP: NEGATIVE
CLARITY UR REFRACT.AUTO: ABNORMAL
COLOR UR AUTO: YELLOW
GLUCOSE UR QL STRIP: ABNORMAL
HGB UR QL STRIP: NEGATIVE
HYALINE CASTS UR QL AUTO: 0 /LPF
KETONES UR QL STRIP: NEGATIVE
LEUKOCYTE ESTERASE UR QL STRIP: ABNORMAL
MICROSCOPIC COMMENT: ABNORMAL
NITRITE UR QL STRIP: NEGATIVE
PH UR STRIP: 8 [PH] (ref 5–8)
PROT UR QL STRIP: ABNORMAL
RBC #/AREA URNS AUTO: 3 /HPF (ref 0–4)
SP GR UR STRIP: 1.01 (ref 1–1.03)
SQUAMOUS #/AREA URNS AUTO: 8 /HPF
URN SPEC COLLECT METH UR: ABNORMAL
UROBILINOGEN UR STRIP-ACNC: NEGATIVE EU/DL
WBC #/AREA URNS AUTO: 53 /HPF (ref 0–5)
WBC CLUMPS UR QL AUTO: ABNORMAL

## 2018-01-11 PROCEDURE — 99215 OFFICE O/P EST HI 40 MIN: CPT | Mod: PBBFAC | Performed by: NURSE PRACTITIONER

## 2018-01-11 PROCEDURE — 99999 PR PBB SHADOW E&M-EST. PATIENT-LVL V: CPT | Mod: PBBFAC,,, | Performed by: NURSE PRACTITIONER

## 2018-01-11 PROCEDURE — 81001 URINALYSIS AUTO W/SCOPE: CPT

## 2018-01-11 PROCEDURE — 99496 TRANSJ CARE MGMT HIGH F2F 7D: CPT | Mod: S$PBB,,, | Performed by: NURSE PRACTITIONER

## 2018-01-11 PROCEDURE — 99496 TRANSJ CARE MGMT HIGH F2F 7D: CPT | Mod: PBBFAC | Performed by: NURSE PRACTITIONER

## 2018-01-11 PROCEDURE — 87086 URINE CULTURE/COLONY COUNT: CPT

## 2018-01-11 RX ORDER — HYDRALAZINE HYDROCHLORIDE 25 MG/1
25 TABLET, FILM COATED ORAL EVERY 12 HOURS
Qty: 60 TABLET | Refills: 1 | Status: ON HOLD | OUTPATIENT
Start: 2018-01-11 | End: 2018-03-01 | Stop reason: HOSPADM

## 2018-01-11 NOTE — PROGRESS NOTES
Attempt #:  1  This CSW attempted to reach patient  to provide resource. Patient reports she is unavailable at this time, she is driving. Patient ask that this CSW contact her back on tomorrow.

## 2018-01-11 NOTE — PATIENT INSTRUCTIONS
1) Hold Hydralazine for SBP (top number) < 130  *Call with questions if you are uncertain.       2) Keep a log of Blood Pressures, document and bring to all clinic apts.     3) Will contact you with results from Urine done in clinic today.     [facility] Visit (Post Discharge Follow-up) Today:   - Our clinic physicians and nurses plan to follow the patient up for any medical issues in the [facility] for 30 days post discharge.    Future Appointments  Date Time Provider Department [place]   1/12/2018 8:30 AM [facility] LAB [place]   1/17/2018 1:20 PM Viktor Bass MD [facility] NEPHRO [place]   2/27/2018 3:20 PM Stan Sosa MD [facility] Herminio DANIEL   3/7/2018 1:40 PM Stan Sosa MD [facility] Herminio MACKENZIEW

## 2018-01-11 NOTE — PROGRESS NOTES
[facility]  New Visit Progress Note   Recent Hospital Discharge     PRESENTING HISTORY     Chief Complaint/Reason for Visit:  Follow up Hospital Discharge   No chief complaint on file.    PCP: Stan Sosa MD    History of Present Illness: Ms. Holly Patel is a 27 y.o. female who was recently admitted to the hospital.    [facility] Hospital Medicine  Discharge Summary        Patient Name: Holly Patel  MRN: [id number]  Admission Date: 1/3/2018  Hospital Length of Stay: 2 days  Discharge Date and Time: 1/5/18  Attending Physician: No att. providers found   Discharging Provider: Alexander Chicas MD  Primary Care Provider: Stan Sosa MD  [facility] Medicine Team: [facility]OSP MED  Alexander Chicas MD     HPI:   This is a 27 y.o. Female with a history of a liver transplant ESRD on dialyisis, renovascular HTN, splenomegaly, MRSA bacteremia, chronic rejection of liver transplant, immunosuppressed, secondary hyperparathyroidism, and thrombocytoenia who presents with complaint of lower quadrant abdominal pain and swelling for approximately 7 days. She indicates associated nausea, vomiting, and diarrhea. She denies dysuria or any urinary symptoms. In the ED found to have a UTI and uncontrolled BPs which other mother states has been happening for a long time.       * No surgery found *       Hospital Course:       * Urinary tract infection without hematuria     - rocephin 1 g daily and follow up urine cultures; sending home on augmentin for 5 days based on previous urine cultures         Uremia     - intractable N/V; improved with HD;           Moderate protein-calorie malnutrition     - PAB and novasource       Immunosuppressed     - see liver transplant           Thrombocytopenia     - monitor        Anemia in ESRD (end-stage renal disease)     - epo with HD       Renovascular hypertension     - uncontrolled  - restart home labetolol and clonidine; switching norvasc to nifedipine and  adding hydralazine and will titrate        ESRD on hemodialysis     - gets home HD  - nephrology consult s/p HD yesterday; cont home HD       Liver replaced by transplant     - hepatology consult  - cont prograf and prednisone; check daily prograf levels                   Consults:          Consults          Status Ordering Provider       Inpatient consult to Hepatology  Once     Provider:  (Not yet assigned)     Completed REBECCA ISRAEL       Inpatient consult to Nephrology  Once     Provider:  (Not yet assigned)     Completed REBECCA ISRAEL             No new Assessment & Plan notes have been filed under this hospital service since the last note was generated.  Service: Hospital Medicine             Final Active Diagnoses:     Diagnosis Date Noted POA    PRINCIPAL PROBLEM:  Urinary tract infection without hematuria [N39.0] 01/03/2018 Yes    Uremia [N19] 01/04/2018 Yes    Moderate protein-calorie malnutrition [E44.0] 08/16/2017 Yes    Immunosuppressed [D89.9] 08/05/2017 Yes    Thrombocytopenia [D69.6] 04/12/2016 Yes    Anemia in ESRD (end-stage renal disease) [N18.6, D63.1] 10/12/2015 Yes       Chronic    Renovascular hypertension [I15.0] 10/02/2015 Yes    ESRD on hemodialysis [N18.6, Z99.2] 09/30/2015 Not Applicable       Chronic   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 [Z94.4] 09/10/2012 Not Applicable       Chronic       Problems Resolved During this Admission:     Diagnosis Date Noted Date Resolved POA         Discharged Condition: good     Disposition: Home or Self Care     Follow Up:      Follow-up Information      Stan Sosa MD.    Specialty:  Internal Medicine  Contact information:  [address] [facility] [id number] [phone]                 [facility] On 1/11/2018.    Specialty:  [facility]  Why:  3:00pm  Primary Care hospital discharge follow up  Contact information:  [address] [facility] [address] [phone]  Additional information:  [facility]                   Patient Instructions:   No discharge procedures on file.                Pending Diagnostic Studies:      None          Medications:  Reconciled Home Medications:         Discharge Medication List as of 1/5/2018  1:04 PM             START taking these medications     Details   amoxicillin-clavulanate 500-125mg (AUGMENTIN) 500-125 mg Tab Take 1 tablet (500 mg total) by mouth once daily., Starting Fri 1/5/2018, Normal       NIFEdipine (PROCARDIA-XL) 30 MG (OSM) 24 hr tablet Take 1 tablet (30 mg total) by mouth once daily., Starting Fri 1/5/2018, Until Sat 1/5/2019, Normal                 CONTINUE these medications which have CHANGED     Details   labetalol (NORMODYNE) 300 MG tablet Take 2 tablets (600 mg total) by mouth every 8 (eight) hours., Starting Fri 1/5/2018, Normal                 CONTINUE these medications which have NOT CHANGED     Details   cinacalcet (SENSIPAR) 90 MG Tab Take 1 tablet (90 mg total) by mouth daily with breakfast., Starting Wed 12/27/2017, Normal       cloNIDine 0.1 mg/24 hr td ptwk (CATAPRES) 0.1 mg/24 hr Place 1 patch onto the skin every 7 days. Patient changes on Friday, Historical Med       food supplemt, lactose-reduced (ENSURE ACTIVE HIGH PROTEIN) Liqd Take 236 mLs by mouth 2 (two) times daily., Starting Wed 8/16/2017, Normal       predniSONE (DELTASONE) 1 MG tablet Take 1 mg by mouth every other day., Until Discontinued, Historical Med       tacrolimus (PROGRAF) 1 MG Cap Take 5 capsules (5 mg total) by mouth every 12 (twelve) hours., Starting Wed 8/30/2017, Until Thu 8/30/2018, Normal       triamcinolone acetonide 0.1% (KENALOG) 0.1 % ointment AAA on arms, legs, and neck bid x 1-2 wks then prn flares only, Normal                STOP taking these medications         amlodipine (NORVASC) 10 MG tablet Comments:   Reason for Stopping:            cloNIDine (CATAPRES) 0.1 MG tablet Comments:   Reason for Stopping:         "            Indwelling Lines/Drains at time of discharge:       Lines/Drains/Airways            Drain                          Hemodialysis AV Fistula Left forearm -- days                      Time spent on the discharge of patient: 33 minutes  Patient was seen and examined on the date of discharge and determined to be suitable for discharge.           Alexander Chicas MD  Department of Hospital Medicine  [facility]      Electronically signed by Alexander Chicas MD at 1/6/2018  4:09 PM        ED to Hosp-Admission (Discharged) on 1/3/2018            Routing History            Detailed Report           Note shared with patient     ____________________________________________    Today:  Holly presents to  today for hospital follow up, accompanied by her sister. Presents with no complaints.  Does not endorse fever, chills, N/V, abd pain, sob, chest pain, headaches, dizziness or other discomforts.  Also denies sxs of dysuria, including frequency, foul smelling urine, and / or urgency. She did not bring any medications to clinic today. She is reportedly compliant with taking all prescribed medications.   States, "my pressure is good today".   Did not seem very engaged in the visit.     Review of Systems:  Eyes: denies visual changes at this time denies floaters   ENT: no nasal congestion or sore throat  Respiratory: no cough or shorness of breath  Cardiovascular: no chest pain or palpitations  Gastrointestinal: no nausea or vomiting, no abdominal pain or change in bowel habits  Genitourinary: no hematuria or dysuria; denies frequency  Hematologic/Lymphatic: no easy bruising or lymphadenopathy  Musculoskeletal: no arthralgias or myalgias  Neurological: no seizures or tremors  Endocrine: no heat or cold intolerance      PAST HISTORY:     Past Medical History:   Diagnosis Date    Anemia in ESRD (end-stage renal disease) 10/12/2015    Chronic rejection of liver transplant 3/22/2016    ESRD on " hemodialysis 2015    History of splenomegaly 2016    Immunosuppressed 2017    Iron deficiency anemia secondary to inadequate dietary iron intake 2017    She receives [facility] iron [facility] at [facility].   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 ()    Moderate protein-calorie malnutrition 2017    MRSA bacteremia 2017    Prophylactic immunotherapy 2014    Renovascular hypertension 10/2/2015    Secondary hyperparathyroidism 2017    Thrombocytopenia 2016       Past Surgical History:   Procedure Laterality Date     SECTION      2     KIDNEY TRANSPLANT      LIVER BIOPSY      LIVER TRANSPLANT  1992    TUBAL LIGATION         Family History   Problem Relation Age of Onset    Hypertension Mother     Hypertension Father     Melanoma Neg Hx        Social History     Social History    Marital status:      Spouse name: N/A    Number of children: N/A    Years of education: N/A     Social History Main Topics    Smoking status: Never Smoker    Smokeless tobacco: Never Used    Alcohol use No    Drug use: No    Sexual activity: Yes     Partners: Male     Other Topics Concern    Are You Pregnant Or Think You May Be? No    Breast-Feeding No     Social History Narrative    Lives 2 kids, 7 and 8, and nephew. Her mom helps with kids.       MEDICATIONS & ALLERGIES:     Current Outpatient Prescriptions on File Prior to Visit   Medication Sig Dispense Refill    amoxicillin-clavulanate 500-125mg (AUGMENTIN) 500-125 mg Tab Take 1 tablet (500 mg total) by mouth once daily. 3 tablet 0    cinacalcet (SENSIPAR) 90 MG Tab Take 1 tablet (90 mg total) by mouth daily with breakfast. 30 tablet 3    cloNIDine 0.1 mg/24 hr td ptwk (CATAPRES) 0.1 mg/24 hr Place 1 patch onto the skin every 7 days. Patient changes on Friday      dicyclomine (BENTYL) 20 mg tablet Take 1 tablet (20 mg total) by mouth 2 (two) times daily. 20  tablet 0    famotidine (PEPCID) 20 MG tablet Take 1 tablet (20 mg total) by mouth 2 (two) times daily. 20 tablet 0    food supplemt, lactose-reduced (ENSURE ACTIVE HIGH PROTEIN) Liqd Take 236 mLs by mouth 2 (two) times daily. 60 Can 6    labetalol (NORMODYNE) 300 MG tablet Take 2 tablets (600 mg total) by mouth every 8 (eight) hours. 180 tablet 2    NIFEdipine (PROCARDIA-XL) 30 MG (OSM) 24 hr tablet Take 1 tablet (30 mg total) by mouth once daily. 30 tablet 11    ondansetron (ZOFRAN) 4 MG tablet Take 1 tablet (4 mg total) by mouth every 6 (six) hours. 12 tablet 0    predniSONE (DELTASONE) 1 MG tablet Take 1 mg by mouth every other day.      tacrolimus (PROGRAF) 1 MG Cap Take 5 capsules (5 mg total) by mouth every 12 (twelve) hours. 300 capsule 11    triamcinolone acetonide 0.1% (KENALOG) 0.1 % ointment AAA on arms, legs, and neck bid x 1-2 wks then prn flares only (Patient taking differently: Apply to affected area(s) on arms, legs, and neck twice daily x 1-2 wks then as needed for flares only) 80 g 3     No current facility-administered medications on file prior to visit.         Review of patient's allergies indicates:   Allergen Reactions    Chloral hydrate      Other reaction(s): Hallucinations  Other reaction(s): Hives    Hydrocodone Other (See Comments)     Mental status changes       OBJECTIVE:     Vital Signs:  There were no vitals filed for this visit.  Wt Readings from Last 1 Encounters:   01/06/18 1834 57.6 kg (127 lb)     There is no height or weight on file to calculate BMI.   Wt Readings from Last 3 Encounters:   01/11/18 56.9 kg (125 lb 7.1 oz)   01/06/18 57.6 kg (127 lb)   01/03/18 57.6 kg (127 lb)     Temp Readings from Last 3 Encounters:   01/07/18 97.9 °F (36.6 °C) (Oral)   01/05/18 98.7 °F (37.1 °C) (Oral)   12/22/17 98 °F (36.7 °C)     BP Readings from Last 3 Encounters:   01/11/18 (!) 168/80   01/07/18 (!) 145/95   01/05/18 131/77     Pulse Readings from Last 3 Encounters:   01/11/18  93   01/07/18 63   01/05/18 86       Physical Exam:  General: Well developed, well nourished. No distress.  HEENT: Head is normocephalic, atraumatic; ears are normal.   Eyes: Clear conjunctiva.  Neck: Supple, symmetrical neck; trachea midline.  Lungs: Clear to auscultation bilaterally and normal respiratory effort.  Cardiovascular: Heart with regular rate and rhythm. No murmurs, gallops or rubs  Extremities: No LE edema. Pulses 2+ and symmetric. RUE, AV Fistula with + Bruit and thrill.   Abdomen: Abdomen is soft, non-tender non-distended with normal bowel sounds.  Skin: Skin color, texture, turgor normal. No rashes.  Musculoskeletal: Normal gait.   Lymph Nodes: No cervical or supraclavicular adenopathy.  Neurologic:  No focal numbness or weakness.   Psychiatric: Not depressed.        Laboratory  Lab Results   Component Value Date    WBC 4.58 01/06/2018    HGB 9.2 (L) 01/06/2018    HCT 28.5 (L) 01/06/2018    MCV 80 (L) 01/06/2018    PLT 89 (L) 01/06/2018     BMP  Lab Results   Component Value Date     01/06/2018    K 4.4 01/06/2018     01/06/2018    CO2 22 (L) 01/06/2018    BUN 38 (H) 01/06/2018    CREATININE 12.2 (H) 01/06/2018    CALCIUM 7.1 (L) 01/06/2018    ANIONGAP 14 01/06/2018    ESTGFRAFRICA 4.3 (A) 01/06/2018    EGFRNONAA 3.8 (A) 01/06/2018     Lab Results   Component Value Date    ALT 34 01/06/2018    AST 37 01/06/2018     (H) 06/27/2012    ALKPHOS 581 (H) 01/06/2018    BILITOT 0.5 01/06/2018     Lab Results   Component Value Date    INR 1.1 01/04/2018    INR 1.0 08/05/2017    INR 1.0 07/14/2017     Lab Results   Component Value Date    HGBA1C 4.7 10/01/2015     No results for input(s): POCTGLUCOSE in the last 72 hours.      2 D echo    Ref Range & Units 4mo ago 5mo ago    EF 55 - 65 55  60     Mitral Valve Regurgitation  MILD TO MODERATE  MILD     Diastolic Dysfunction  Yes   No     Aortic Valve Regurgitation  TRIVIAL      Est. PA Systolic Pressure  34.14  24.9     Tricuspid Valve  Regurgitation  TRIVIAL TO MILD     Resulting Agency  [facility]   Narrative     Date of Procedure: 09/05/2017        TEST DESCRIPTION       Aorta: The aortic root is normal in size, measuring 2.6 cm at sinotubular junction and 3.3 cm at Sinuses of Valsalva. The proximal ascending aorta is normal in size, measuring 3.1 cm across.     Left Atrium: The left atrium is normal in size, measuring 3.7 cm across in the parasternal view, and 5.1 cm across in the apical view.     Left Ventricle: The left ventricle is normal in size, with an end-diastolic diameter of 4.6 cm, and an end-systolic diameter of 2.9 cm. Wall thickness is mildly increased, with the septum measuring 1.3 cm and the posterior wall measuring 1.4 cm across.   Relative wall thickness was increased at 0.61, and the LV mass index was increased at 175.6 g/m2 consistent with concentric left ventricular hypertrophy. There are no regional wall motion abnormalities. Left ventricular systolic function appears normal.   Visually estimated ejection fraction is 55-60%. The LV Doppler derived stroke volume equals 78.0 ccs.     Diastolic indices: E wave velocity 1.3 m/s, E/A ratio 1.6,  msec., E/e' ratio(avg) 14. There is pseudonormalization of mitral inflow pattern consistent with significant diastolic dysfunction.     Right Atrium: The right atrium is normal in size, measuring 4.5 cm in length and 3.5 cm in width in the apical view.     Right Ventricle: The right ventricle is normal in size measuring 3.5 cm at the base in the apical right ventricle-focused view. Global right ventricular systolic function appears normal. Tricuspid annular plane systolic excursion (TAPSE) is 2.7 cm.   Tissue Doppler-derived tricuspid annular peak systolic velocity (S prime) is 16.2 cm/s. The estimated PA systolic pressure is 34 mmHg.     Aortic Valve:  Additionally, there is trivial aortic regurgitation.     Mitral Valve:  There is mild to moderate mitral regurgitation.      Tricuspid Valve:  There is trivial to mild tricuspid regurgitation.     Pulmonary Valve:  There is trivial pulmonic regurgitation.     IVC: IVC is normal in size and collapses > 50% with a sniff, suggesting normal right atrial pressure of 3 mmHg.     Intracavitary: There is no evidence of pericardial effusion, intracavity mass, thrombi, or vegetation.         CONCLUSIONS     1 - Normal left ventricular systolic function (EF 55-60%).     2 - Concentric hypertrophy.     3 - Impaired LV relaxation, elevated LAP (grade 2 diastolic dysfunction).     4 - Trivial aortic regurgitation.     5 - Mild to moderate mitral regurgitation.     6 - Trivial to mild tricuspid regurgitation.     7 - Trivial pulmonic regurgitation.             This document has been electronically    SIGNED BY: Smooth Olsen MD On: 09/05/2017 12:13      Specimen Collected: 09/05/17 10:31 Last Resulted: 09/05/17 12:19        Procedure comments: The patient was surveyed from the lung bases through the pelvis without the administration of IV or oral contrast and data was reconstructed for coronal, sagittal, and axial images.    Comparison: Liver transplant ultrasound 6/12/2017, CTA abdomen pelvis 10/11/2015.    Findings:    The lung bases show patchy groundglass opacification which may correlate with infectious or noninfectious inflammatory change.  A partially visualized 0.7 cm right middle lobe pulmonary nodule is present.  A stable 0.4 cm left upper lobe pulmonary nodule is also seen.  There is small volume right pleural fluid.  The visualized portions of the heart appear normal.    The patient is status post liver transplant, the liver is normal in size and attenuation with no focal hepatic abnormality identified on this noncontrast examination.  The gallbladder is surgically absent.  There is no intra-or extrahepatic biliary ductal dilatation.  There is moderate volume ascites.    The stomach, pancreas, and adrenal glands are unremarkable.  The  spleen is significantly enlarged.    The kidneys are significantly atrophic, compatible with this patient's history of end-stage renal disease..  There is no evidence of hydronephrosis.  The visualized ureters appear normal in course and caliber without evidence of ureteral dilatation. The urinary bladder, uterus, and ovaries demonstrate no significant abnormality.    The abdominal aorta is normal in course and caliber without significant atherosclerotic calcifications.    The visualized loops of small and large bowel show no evidence of obstruction or inflammation.  There is no intraperitoneal free air. There is no evidence of lymph node enlargement in the abdomen or pelvis.    When viewed with bone windows the osseous structures demonstrate diffuse sclerosis, compatible with renal osteodystrophy.  Throughout the visualized spine, there are prominent lucencies adjacent to the endplates at T9, T10, T11, L1, L2, L5, and S1.  While these lesions are new since prior CT 10/2015 and lesions at L1 and L2 are large in size, all are favored to represent large Schmorl's nodes in the setting of renal osteodystrophy.      The extraperitoneal soft tissues are unremarkable.   Impression         1. No cause for this patient's abdominal pain is identified.    2.  Findings compatible with portal hypertension including splenomegaly and moderate volume ascites.    3.  Multilevel endplate lucencies throughout the visualized spine on a background of diffuse osseous sclerosis.  These are favored to represent large Schmorl's nodes in the setting of renal osteodystrophy.  Followup as clinically indicated.    4.  Additional findings include:  -Patchy bibasilar groundglass opacification which may relate with infectious or noninfectious inflammatory change  -0.4 cm left upper lobe and partially visualized 0.7 cm right upper lobe pulmonary nodule.  Per Fleischner Society 2017 guidelines; in a low risk patient,  CT chest 6-12 months with  followup CT chest in 18-24 months.  In a high risk patient  history of cancer/ smoker, CT chest 6-12 months with followup 18- 24 month CT chest to evaluate for stability or change.   -Small right pleural effusion  -Postsurgical change of orthotopic liver transplant  -Significant renal atrophy, compatible with this patient's history of end-stage renal disease  ______________________________________     Electronically signed by resident: DALLIN ONOFRE MD  Date: 01/03/18  Time: 19:09            As the supervising and teaching physician, I personally reviewed the images and resident's interpretation and I agree with the findings.          Electronically signed by: LUCIE LARA MD  Date: 01/03/18  Time: 19:24           TRANSITION OF CARE:     [facility] On Call Contact Note: 1/10/2018    Family and/or Caretaker present at visit?  Yes.  Diagnostic tests reviewed/disposition: I have reviewed all completed as well as pending diagnostic tests at the time of discharge.  Disease/illness education:  UTI, HTN, Meds  Home health/community services discussion/referrals: Patient does not have home health established from hospital visit.  They do not need home health.  If needed, we will set up home health for the patient.   Establishment or re-establishment of referral orders for community resources: No other necessary community resources.   Discussion with other health care providers: No discussion with other health care providers necessary.     Transition of Care Visit:     I have reviewed and updated the history and problem list.  I have reconciled the medication list.  I have discussed the hospitalization and current medical issues, prognosis and plans with the patient/family.  I  spent more than 50% of time discussing the care with the patient/family.  Total Encounter in the [facility]: 60 minutes.    Medications Reconciliation:   I have reconciled the patient's home medications and discharge medications with the  patient/family. I have updated all changes.  Refer to After-Visit Medication List.    ASSESSMENT & PLAN:     HIGH RISK CONDITION(S):    During admission Lipase (37, wnl) and Liver enzymes are wnl.       Recent admission for Strep Agalactiae (Group B) UTI (12/17):  *12/22, 12/27, 1/3:  UPT: negative  *Blood Cultures (12/17): NG  *Received Rocephin while IP  - Augmentin completed   *Given that she is considered Higher Risk, will repeat UA and Culture to test for cure, proceeding completion of abxs in this chronically immunocompromised / immunosuppressed patient.        Long Standing, Chronically Uncontrolled Renovascular Hypertension:   Goal: < 130/90   *(155/104 at time of discharge)  Today: 168/80 (asymptomatic)  Home Range: (since discharge, reports, as did not bring log to clinic today: Highest: 170/96 / Lowest: 160/80)  - continue Procardia XL 30 (stopped Norvasc)  - continue Labetalol 600 / 600  - continue Catapres 0.1 TTS (wearing)  - add Hydralazine 25 mg bid (patient has been instructed to hold for SBP < 130)  - will re-eval back in clinic in 4 days (Monday, 1/15,  given direct clinic # with questions in the interim)        *Have advised to monitor Blood Pressures Pre and        Post Dialysis sessions and keep a log (provided with        logs).   *Suggest having Eye Exam given her long standing issues with uncontrolled HTN; she agreed. (apt scheduled)        Presented with N/V 2/2 Uremia with history of ESRD (HD Dependent):   *Improved with dialysis, which she is set up to undergo her sessions at home  - follow up with Nephrology on 1/17      History of Liver Transplantation, chronic Immunosuppression:   - Prednisone  - Prograf (Level: 6.0 on 1/5/2018)      Instructions for the patient:  1) Hold Hydralazine for SBP (top number) < 130  *Call with questions if you are uncertain.       2) Keep a log of Blood Pressures, document and bring to all clinic apts.     3) Will contact you with results from Urine done in  clinic today.      [facility] Visit (Post Discharge Follow-up) Today:   - Our clinic physicians and nurses plan to follow the patient up for any medical issues in the [facility] for 30 days post discharge.    Future Appointments  Date Time Provider Department Center   1/12/2018 8:30 AM LAB, [facility]   1/15/2018 4:00 PM SAM Huerta [facility]   1/17/2018 1:20 PM Viktor Bass MD [facility] NEPHRO [place]   2/27/2018 3:20 PM Stan Sosa MD [facility] IM [facility]   3/7/2018 1:40 PM Stan Sosa MD [facility]          Medication List          Accurate as of 1/11/18  3:51 PM. If you have any questions, ask your nurse or doctor.               START taking these medications    hydrALAZINE 25 MG tablet  Commonly known as:  APRESOLINE  Take 1 tablet (25 mg total) by mouth every 12 (twelve) hours.  Started by:  SAM Duncan        CHANGE how you take these medications    triamcinolone acetonide 0.1% 0.1 % ointment  Commonly known as:  KENALOG  AAA on arms, legs, and neck bid x 1-2 wks then prn flares only  What changed:  additional instructions        CONTINUE taking these medications    cinacalcet 90 MG Tab  Commonly known as:  SENSIPAR  Take 1 tablet (90 mg total) by mouth daily with breakfast.     cloNIDine 0.1 mg/24 hr td ptwk 0.1 mg/24 hr  Commonly known as:  CATAPRES     dicyclomine 20 mg tablet  Commonly known as:  BENTYL  Take 1 tablet (20 mg total) by mouth 2 (two) times daily.     famotidine 20 MG tablet  Commonly known as:  PEPCID  Take 1 tablet (20 mg total) by mouth 2 (two) times daily.     food supplemt, lactose-reduced Liqd  Commonly known as:  ENSURE ACTIVE HIGH PROTEIN  Take 236 mLs by mouth 2 (two) times daily.     labetalol 300 MG tablet  Commonly known as:  NORMODYNE  Take 2 tablets (600 mg total) by mouth every 8 (eight) hours.     NIFEdipine 30 MG (OSM) 24 hr tablet  Commonly known as:  PROCARDIA-XL  Take 1 tablet (30  mg total) by mouth once daily.     ondansetron 4 MG tablet  Commonly known as:  ZOFRAN  Take 1 tablet (4 mg total) by mouth every 6 (six) hours.     predniSONE 1 MG tablet  Commonly known as:  DELTASONE     tacrolimus 1 MG Cap  Commonly known as:  PROGRAF  Take 5 capsules (5 mg total) by mouth every 12 (twelve) hours.        STOP taking these medications    amoxicillin-clavulanate 500-125mg 500-125 mg Tab  Commonly known as:  AUGMENTIN  Stopped by:  SAM Duncan           Where to Get Your Medications      These medications were sent to [id number] - [place], [place] - [address]  [address], [place] [address]    Phone:  [phone]   · hydrALAZINE 25 MG tablet         Signing Physician:  SAM Duncan

## 2018-01-12 ENCOUNTER — OUTPATIENT CASE MANAGEMENT (OUTPATIENT)
Dept: ADMINISTRATIVE | Facility: OTHER | Age: 28
End: 2018-01-12

## 2018-01-12 NOTE — PROGRESS NOTES
2nd attempt    This CSW attempted to reach patient/caregiver to provide resource and left msg requesting a return call.  Letter with contact information was sent via Patient Portal to patient/caregiver.  Referral source notified.

## 2018-01-12 NOTE — LETTER
January 12, 2018    Holly Patel  [address] [place] [id number]             [facility] [address] [place] [address] Dear : Holly Patel    I am writing from the Outpatient Complex Care Management Department at [facility].  I received a referral from Dr. Stan Sosa to contact you or your caregiver regarding any needs you may have. I have attempted to contact you or your cargeiver by phone two times unsuccessfully.  Please contact the Outpatient Complex Care Management Department at [phone] if you would like to discuss your needs.      Sincerely,         MARYCHUY Jamil

## 2018-01-13 LAB — BACTERIA UR CULT: NORMAL

## 2018-01-15 ENCOUNTER — TELEPHONE (OUTPATIENT)
Dept: TRANSPLANT | Facility: CLINIC | Age: 28
End: 2018-01-15

## 2018-01-15 ENCOUNTER — TELEPHONE (OUTPATIENT)
Dept: INTERNAL MEDICINE | Facility: CLINIC | Age: 28
End: 2018-01-15

## 2018-01-15 NOTE — TELEPHONE ENCOUNTER
Pt called requesting apt to see Dr. Shahida may for swelling in legs and abdomen. Pt requesting apt this week.  Discussed that I need to look into this and will call her back with apt day and time.     Booked pt Wednesday 1/17 at 2:30.  Called pt to review apt day and time but no answer. LVM with apt, requested she call to confirm and advised she needs to reschedule her optometry apt because it is at 2.

## 2018-01-17 ENCOUNTER — HOSPITAL ENCOUNTER (EMERGENCY)
Facility: HOSPITAL | Age: 28
Discharge: HOME | End: 2018-01-17
Attending: EMERGENCY MEDICINE
Payer: MEDICARE

## 2018-01-17 ENCOUNTER — OFFICE VISIT (OUTPATIENT)
Dept: TRANSPLANT | Facility: CLINIC | Age: 28
End: 2018-01-17
Payer: MEDICARE

## 2018-01-17 VITALS
HEART RATE: 92 BPM | RESPIRATION RATE: 16 BRPM | SYSTOLIC BLOOD PRESSURE: 180 MMHG | TEMPERATURE: 98 F | DIASTOLIC BLOOD PRESSURE: 140 MMHG | OXYGEN SATURATION: 97 % | HEIGHT: 65 IN | BODY MASS INDEX: 22.03 KG/M2 | WEIGHT: 132.25 LBS

## 2018-01-17 VITALS
RESPIRATION RATE: 18 BRPM | TEMPERATURE: 99 F | HEART RATE: 98 BPM | DIASTOLIC BLOOD PRESSURE: 110 MMHG | BODY MASS INDEX: 21.66 KG/M2 | WEIGHT: 130 LBS | SYSTOLIC BLOOD PRESSURE: 205 MMHG | OXYGEN SATURATION: 100 % | HEIGHT: 65 IN

## 2018-01-17 DIAGNOSIS — Z99.2 ESRD ON HEMODIALYSIS: Chronic | ICD-10-CM

## 2018-01-17 DIAGNOSIS — R74.8 ELEVATED LIVER ENZYMES: ICD-10-CM

## 2018-01-17 DIAGNOSIS — N30.00 ACUTE CYSTITIS WITHOUT HEMATURIA: Primary | ICD-10-CM

## 2018-01-17 DIAGNOSIS — Z94.4 LIVER REPLACED BY TRANSPLANT: Chronic | ICD-10-CM

## 2018-01-17 DIAGNOSIS — R10.9 ABDOMINAL PAIN, UNSPECIFIED ABDOMINAL LOCATION: ICD-10-CM

## 2018-01-17 DIAGNOSIS — N18.6 ESRD ON HEMODIALYSIS: Chronic | ICD-10-CM

## 2018-01-17 DIAGNOSIS — E87.70 HYPERVOLEMIA, UNSPECIFIED HYPERVOLEMIA TYPE: ICD-10-CM

## 2018-01-17 DIAGNOSIS — I15.0 RENOVASCULAR HYPERTENSION: ICD-10-CM

## 2018-01-17 DIAGNOSIS — Z29.89 PROPHYLACTIC IMMUNOTHERAPY: Primary | ICD-10-CM

## 2018-01-17 DIAGNOSIS — R18.8 OTHER ASCITES: ICD-10-CM

## 2018-01-17 LAB
ALBUMIN SERPL BCP-MCNC: 2.8 G/DL
ALP SERPL-CCNC: 636 U/L
ALT SERPL W/O P-5'-P-CCNC: 45 U/L
ANION GAP SERPL CALC-SCNC: 18 MMOL/L
AST SERPL-CCNC: 50 U/L
B-HCG UR QL: NEGATIVE
BACTERIA #/AREA URNS HPF: ABNORMAL /HPF
BASOPHILS # BLD AUTO: 0.01 K/UL
BASOPHILS NFR BLD: 0.3 %
BILIRUB SERPL-MCNC: 0.6 MG/DL
BILIRUB UR QL STRIP: NEGATIVE
BUN SERPL-MCNC: 76 MG/DL
BURR CELLS BLD QL SMEAR: ABNORMAL
CALCIUM SERPL-MCNC: 9.2 MG/DL
CHLORIDE SERPL-SCNC: 104 MMOL/L
CLARITY UR: CLEAR
CO2 SERPL-SCNC: 17 MMOL/L
COLOR UR: YELLOW
CREAT SERPL-MCNC: 14.9 MG/DL
CTP QC/QA: YES
DIFFERENTIAL METHOD: ABNORMAL
EOSINOPHIL # BLD AUTO: 0.2 K/UL
EOSINOPHIL NFR BLD: 6.6 %
ERYTHROCYTE [DISTWIDTH] IN BLOOD BY AUTOMATED COUNT: 17.3 %
EST. GFR  (AFRICAN AMERICAN): 3 ML/MIN/1.73 M^2
EST. GFR  (NON AFRICAN AMERICAN): 3 ML/MIN/1.73 M^2
GLUCOSE SERPL-MCNC: 104 MG/DL
GLUCOSE UR QL STRIP: ABNORMAL
HCG INTACT+B SERPL-ACNC: 3 MIU/ML
HCT VFR BLD AUTO: 27.3 %
HGB BLD-MCNC: 9.1 G/DL
HGB UR QL STRIP: ABNORMAL
HYALINE CASTS #/AREA URNS LPF: 0 /LPF
KETONES UR QL STRIP: NEGATIVE
LEUKOCYTE ESTERASE UR QL STRIP: ABNORMAL
LIPASE SERPL-CCNC: 65 U/L
LYMPHOCYTES # BLD AUTO: 0.6 K/UL
LYMPHOCYTES NFR BLD: 16.9 %
MAGNESIUM SERPL-MCNC: 2 MG/DL
MCH RBC QN AUTO: 26.1 PG
MCHC RBC AUTO-ENTMCNC: 33.3 G/DL
MCV RBC AUTO: 78 FL
MICROSCOPIC COMMENT: ABNORMAL
MONOCYTES # BLD AUTO: 0.2 K/UL
MONOCYTES NFR BLD: 5.4 %
NEUTROPHILS # BLD AUTO: 2.5 K/UL
NEUTROPHILS NFR BLD: 71.1 %
NITRITE UR QL STRIP: NEGATIVE
PH UR STRIP: 8 [PH] (ref 5–8)
PHOSPHATE SERPL-MCNC: 8 MG/DL
PLATELET # BLD AUTO: 67 K/UL
PMV BLD AUTO: ABNORMAL FL
POIKILOCYTOSIS BLD QL SMEAR: SLIGHT
POLYCHROMASIA BLD QL SMEAR: ABNORMAL
POTASSIUM SERPL-SCNC: 5 MMOL/L
PROT SERPL-MCNC: 7.1 G/DL
PROT UR QL STRIP: ABNORMAL
RBC # BLD AUTO: 3.48 M/UL
RBC #/AREA URNS HPF: 20 /HPF (ref 0–4)
SODIUM SERPL-SCNC: 139 MMOL/L
SP GR UR STRIP: 1.01 (ref 1–1.03)
SQUAMOUS #/AREA URNS HPF: 5 /HPF
TARGETS BLD QL SMEAR: ABNORMAL
TRICHOMONAS UR QL MICRO: ABNORMAL
URN SPEC COLLECT METH UR: ABNORMAL
UROBILINOGEN UR STRIP-ACNC: NEGATIVE EU/DL
WBC # BLD AUTO: 3.49 K/UL
WBC #/AREA URNS HPF: 20 /HPF (ref 0–5)

## 2018-01-17 PROCEDURE — 87086 URINE CULTURE/COLONY COUNT: CPT

## 2018-01-17 PROCEDURE — 25000003 PHARM REV CODE 250: Performed by: EMERGENCY MEDICINE

## 2018-01-17 PROCEDURE — 99215 OFFICE O/P EST HI 40 MIN: CPT | Mod: S$PBB,,, | Performed by: INTERNAL MEDICINE

## 2018-01-17 PROCEDURE — 99285 EMERGENCY DEPT VISIT HI MDM: CPT | Mod: 25,27

## 2018-01-17 PROCEDURE — 80053 COMPREHEN METABOLIC PANEL: CPT

## 2018-01-17 PROCEDURE — 96375 TX/PRO/DX INJ NEW DRUG ADDON: CPT

## 2018-01-17 PROCEDURE — 84702 CHORIONIC GONADOTROPIN TEST: CPT

## 2018-01-17 PROCEDURE — 85025 COMPLETE CBC W/AUTO DIFF WBC: CPT

## 2018-01-17 PROCEDURE — 99213 OFFICE O/P EST LOW 20 MIN: CPT | Mod: PBBFAC,25 | Performed by: INTERNAL MEDICINE

## 2018-01-17 PROCEDURE — 84100 ASSAY OF PHOSPHORUS: CPT

## 2018-01-17 PROCEDURE — 63600175 PHARM REV CODE 636 W HCPCS: Performed by: EMERGENCY MEDICINE

## 2018-01-17 PROCEDURE — 96374 THER/PROPH/DIAG INJ IV PUSH: CPT

## 2018-01-17 PROCEDURE — 83735 ASSAY OF MAGNESIUM: CPT

## 2018-01-17 PROCEDURE — 81025 URINE PREGNANCY TEST: CPT | Performed by: EMERGENCY MEDICINE

## 2018-01-17 PROCEDURE — 99999 PR PBB SHADOW E&M-EST. PATIENT-LVL III: CPT | Mod: PBBFAC,,, | Performed by: INTERNAL MEDICINE

## 2018-01-17 PROCEDURE — 83690 ASSAY OF LIPASE: CPT

## 2018-01-17 PROCEDURE — 81000 URINALYSIS NONAUTO W/SCOPE: CPT

## 2018-01-17 RX ORDER — CEFTRIAXONE 1 G/1
1 INJECTION, POWDER, FOR SOLUTION INTRAMUSCULAR; INTRAVENOUS
Status: COMPLETED | OUTPATIENT
Start: 2018-01-17 | End: 2018-01-17

## 2018-01-17 RX ORDER — CEPHALEXIN 500 MG/1
500 CAPSULE ORAL EVERY 12 HOURS
Qty: 14 CAPSULE | Refills: 0 | Status: ON HOLD | OUTPATIENT
Start: 2018-01-17 | End: 2018-01-26 | Stop reason: HOSPADM

## 2018-01-17 RX ORDER — HYDROMORPHONE HYDROCHLORIDE 2 MG/ML
1 INJECTION, SOLUTION INTRAMUSCULAR; INTRAVENOUS; SUBCUTANEOUS
Status: COMPLETED | OUTPATIENT
Start: 2018-01-17 | End: 2018-01-17

## 2018-01-17 RX ORDER — METRONIDAZOLE 500 MG/1
500 TABLET ORAL EVERY 12 HOURS
Qty: 14 TABLET | Refills: 0 | Status: ON HOLD | OUTPATIENT
Start: 2018-01-17 | End: 2018-01-26 | Stop reason: HOSPADM

## 2018-01-17 RX ORDER — HYDRALAZINE HYDROCHLORIDE 25 MG/1
25 TABLET, FILM COATED ORAL
Status: DISCONTINUED | OUTPATIENT
Start: 2018-01-17 | End: 2018-01-17

## 2018-01-17 RX ORDER — HYDRALAZINE HYDROCHLORIDE 20 MG/ML
20 INJECTION INTRAMUSCULAR; INTRAVENOUS
Status: COMPLETED | OUTPATIENT
Start: 2018-01-17 | End: 2018-01-17

## 2018-01-17 RX ORDER — ONDANSETRON 2 MG/ML
8 INJECTION INTRAMUSCULAR; INTRAVENOUS
Status: COMPLETED | OUTPATIENT
Start: 2018-01-17 | End: 2018-01-17

## 2018-01-17 RX ORDER — CLONIDINE HYDROCHLORIDE 0.1 MG/1
0.3 TABLET ORAL
Status: COMPLETED | OUTPATIENT
Start: 2018-01-17 | End: 2018-01-17

## 2018-01-17 RX ORDER — ONDANSETRON 4 MG/1
4 TABLET, ORALLY DISINTEGRATING ORAL EVERY 12 HOURS PRN
Qty: 12 TABLET | Refills: 0 | Status: SHIPPED | OUTPATIENT
Start: 2018-01-17 | End: 2018-01-20

## 2018-01-17 RX ADMIN — ONDANSETRON 8 MG: 2 INJECTION INTRAMUSCULAR; INTRAVENOUS at 02:01

## 2018-01-17 RX ADMIN — CEFTRIAXONE SODIUM 1 G: 1 INJECTION, POWDER, FOR SOLUTION INTRAMUSCULAR; INTRAVENOUS at 02:01

## 2018-01-17 RX ADMIN — CLONIDINE HYDROCHLORIDE 0.3 MG: 0.1 TABLET ORAL at 02:01

## 2018-01-17 RX ADMIN — HYDRALAZINE HYDROCHLORIDE 20 MG: 20 INJECTION INTRAMUSCULAR; INTRAVENOUS at 02:01

## 2018-01-17 RX ADMIN — HYDROMORPHONE HYDROCHLORIDE 1 MG: 2 INJECTION INTRAMUSCULAR; INTRAVENOUS; SUBCUTANEOUS at 02:01

## 2018-01-17 NOTE — PROGRESS NOTES
Subjective:       Patient ID: Holly Patel is a 27 y.o. female.    Chief Complaint: Liver Transplant Follow-up    HPI  I saw this 27 year old  lady in the liver transplant clinic. She came with her mother. She has deteriorated in the last few weeks and has been in hospital twice    - once with a presumed UTI and more recently with abdominal distension.  - shhe continues to have uncontrolled hypertension and end stage renal disease and is on home HD..    She had high LFTs in June 2017 and a liver biopsy showed stage 2-3 fibrosis as well as evidence of chronic rejection.    She is currently on Tac 7mg BID and finally appears to be taking it more regularly.    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  Seen by Social Work and deemed that she needs to show compliance before she can be evaluated for kidney Tx.      CT abdo: 1/17/2018  1.  Postsurgical changes consistent with liver transplantation with splenomegaly and moderate volume ascites.  2.  Otherwise no acute intra-abdominal abnormalities identified.  3.  Small bilateral pleural effusions, right greater than left with worsening patchy opacification/consolidation within the lower lobes may reflect worsening pulmonary edema versus atypical pneumonia.      Lab Results   Component Value Date    ALT 39 01/17/2018    AST 40 01/17/2018     (H) 06/27/2012    ALKPHOS 612 (H) 01/17/2018    BILITOT 0.6 01/17/2018     Past Medical History:   Diagnosis Date    Anemia in ESRD (end-stage renal disease) 10/12/2015    Chronic rejection of liver transplant 3/22/2016    ESRD on hemodialysis 9/30/2015    History of splenomegaly 4/12/2016    Immunosuppressed 8/5/2017    Iron deficiency anemia secondary to inadequate dietary iron intake 8/16/2017    She [facility].   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)    Moderate  protein-calorie malnutrition 2017    MRSA bacteremia 2017    Prophylactic immunotherapy 2014    Renovascular hypertension 10/2/2015    Secondary hyperparathyroidism 2017    Thrombocytopenia 2016     Past Surgical History:   Procedure Laterality Date     SECTION      2     KIDNEY TRANSPLANT      LIVER BIOPSY      LIVER TRANSPLANT  1992    TUBAL LIGATION       Current Outpatient Prescriptions   Medication Sig    cephALEXin (KEFLEX) 500 MG capsule Take 1 capsule (500 mg total) by mouth every 12 (twelve) hours.    cinacalcet (SENSIPAR) 90 MG Tab Take 1 tablet (90 mg total) by mouth daily with breakfast.    cloNIDine 0.1 mg/24 hr td ptwk (CATAPRES) 0.1 mg/24 hr Place 1 patch onto the skin every 7 days. Patient changes on Friday    dicyclomine (BENTYL) 20 mg tablet Take 1 tablet (20 mg total) by mouth 2 (two) times daily.    famotidine (PEPCID) 20 MG tablet Take 1 tablet (20 mg total) by mouth 2 (two) times daily.    food supplemt, lactose-reduced (ENSURE ACTIVE HIGH PROTEIN) Liqd Take 236 mLs by mouth 2 (two) times daily.    hydrALAZINE (APRESOLINE) 25 MG tablet Take 1 tablet (25 mg total) by mouth every 12 (twelve) hours.    labetalol (NORMODYNE) 300 MG tablet Take 2 tablets (600 mg total) by mouth every 8 (eight) hours.    metroNIDAZOLE (FLAGYL) 500 MG tablet Take 1 tablet (500 mg total) by mouth every 12 (twelve) hours.    NIFEdipine (PROCARDIA-XL) 30 MG (OSM) 24 hr tablet Take 1 tablet (30 mg total) by mouth once daily.    ondansetron (ZOFRAN) 4 MG tablet Take 1 tablet (4 mg total) by mouth every 6 (six) hours.    ondansetron (ZOFRAN-ODT) 4 MG TbDL Take 1 tablet (4 mg total) by mouth every 12 (twelve) hours as needed.    predniSONE (DELTASONE) 1 MG tablet Take 1 mg by mouth every other day.    tacrolimus (PROGRAF) 1 MG Cap Take 5 capsules (5 mg total) by mouth every 12 (twelve) hours.    triamcinolone acetonide 0.1% (KENALOG) 0.1 % ointment AAA on arms, legs,  "and neck bid x 1-2 wks then prn flares only (Patient taking differently: Apply to affected area(s) on arms, legs, and neck twice daily x 1-2 wks then as needed for flares only)     No current facility-administered medications for this visit.        Review of Systems   Constitutional: Negative for activity change, appetite change, chills, fatigue, fever and unexpected weight change.   HENT: Negative for ear pain, hearing loss, nosebleeds, sore throat and trouble swallowing.    Eyes: Negative for redness and visual disturbance.   Respiratory: Negative for cough, chest tightness, shortness of breath and wheezing.    Cardiovascular: Negative for chest pain and palpitations.   Gastrointestinal: Negative for abdominal distention, abdominal pain, blood in stool, constipation, diarrhea, nausea and vomiting.   Genitourinary: Negative for difficulty urinating, dysuria, frequency, hematuria and urgency.   Musculoskeletal: Negative for arthralgias, back pain, gait problem, joint swelling and myalgias.   Skin: Negative for rash.   Neurological: Negative for tremors, seizures, speech difficulty, weakness and headaches.   Hematological: Negative for adenopathy.   Psychiatric/Behavioral: Negative for confusion, decreased concentration and sleep disturbance. The patient is not nervous/anxious.          Objective:    BP (!) 180/140 (BP Location: Right arm, Patient Position: Sitting, BP Method: Medium (Manual))   Pulse 92   Temp 97.8 °F (36.6 °C) (Oral)   Resp 16   Ht 5' 5" (1.651 m)   Wt 60 kg (132 lb 4.4 oz)   SpO2 97%   BMI 22.01 kg/m²     Physical Exam   Constitutional: She appears well-developed and well-nourished. No distress.   HENT:   Head: Normocephalic.   Eyes: Pupils are equal, round, and reactive to light.   Neck: No JVD present. No tracheal deviation present. No thyromegaly present.   Cardiovascular: Normal rate, regular rhythm and normal heart sounds.    No murmur heard.  Pulmonary/Chest: Effort normal and breath " sounds normal. No stridor.   Abdominal: Soft. Bowel sounds are normal. She exhibits distension. There is no tenderness.   Mild ascites   Musculoskeletal: She exhibits no edema.   Lymphadenopathy:        Head (right side): No submental, no submandibular, no tonsillar, no preauricular, no posterior auricular and no occipital adenopathy present.        Head (left side): No submental, no submandibular, no tonsillar, no preauricular, no posterior auricular and no occipital adenopathy present.     She has no cervical adenopathy.     She has no axillary adenopathy.   Neurological: She is alert. She has normal strength. She is not disoriented. No cranial nerve deficit or sensory deficit.   Skin: Skin is intact. No cyanosis.       Assessment:       1. Prophylactic immunotherapy    2. Renovascular hypertension    3. Liver replaced by transplant    4. ESRD on hemodialysis        Plan:   1. Uncontrolled hypertension- nephrologist adjusts her medications  2. Continue with same dose tac and adjust according to levels. I warned her that if she remains non compliant, she will lose her liver from chronic rejection and will likely not get another transplant.  Her new onset ascites most likely reflects worsening liver disease but in view of her dialysis it maybe difficult to treat with diuretics- although she tells me that she is passing significant amounts of urine.      - needs diagnostic para  - abdo US with doppler to check patency of portal vein  - may need torepeat liver biopsy and measure HVPG     Clinic in 6 weeks.

## 2018-01-17 NOTE — DISCHARGE INSTRUCTIONS
"Return to the Emergency Department of any acute worsening of your symptoms or for any other concern.     You should return to the ED for fever/chills, shortness of breath, chest pain, weakness or "passing out".     Pt should take all medications as prescribed.    Pt should follow up with PCP as soon as possible.    The risks associated with not taking your medications as prescribed and not following up with your Primary Care doctor or sub specialist includes worsening of your condition, pain, disability, loss of function or livelihood, and death      STEVE Segal M.D. 3:05 AM 1/17/2018      Our goal in the emergency department is to always give you outstanding care and exceptional service. You may receive a survey by mail or e-mail in the next week regarding your experience in our ED. We would greatly appreciate your completing and returning the survey. Your feedback provides us with a way to recognize our staff who give very good care and it helps us learn how to improve when your experience was below our aspiration of excellence.     "

## 2018-01-17 NOTE — ED TRIAGE NOTES
Pt seen in ED with complaints of SOB x 3 hours and abdominal pain with distintion x 1 week. Pt is a dialysis pt [facility], last went 01/15/18.

## 2018-01-17 NOTE — ED PROVIDER NOTES
Encounter Date: 1/17/2018    SCRIBE #1 NOTE: I, Scottie Daily, am scribing for, and in the presence of,  Des Segal MD. I have scribed the following portions of the note - Other sections scribed: HPI, ROS.       History     Chief Complaint   Patient presents with    Abdominal Pain     Patient states that she has had abdominal pain with 1 week. Patient also reports abdominal distention.     Shortness of Breath     x3 hours.      CC: Abdominal Pain; Abdominal Distention    HPI: This 27 y.o. female presents to the ED via EMS c/o abdominal pain with distention for 1 week accompanied by shortness of breath that began 3 hours ago. Symptoms are acute in onset and severe (9/10). Pt went to dialysis yesterday and reports a normal session. Pt reports daily compliance with her daily medications. Pt denies any fevers, nausea, dysuria, or any other associated symptoms. Pt has no modifying factors. Pt has no prior treatment.  Pt is sexually active and is not using condoms.     Pt has a medical history of Anemia in ESRD (end-stage renal disease) (10/12/2015); Chronic rejection of liver transplant (3/22/2016); ESRD on hemodialysis (9/30/2015); History of splenomegaly (4/12/2016); Immunosuppressed (8/5/2017); Iron deficiency anemia secondary to inadequate dietary iron intake (8/16/2017); Liver replaced by transplant (9/10/2012); Moderate protein-calorie malnutrition (8/16/2017); MRSA bacteremia (8/6/2017); Prophylactic immunotherapy (8/4/2014); Renovascular hypertension (10/2/2015); Secondary hyperparathyroidism (8/5/2017); and Thrombocytopenia (4/12/2016).      The history is provided by the patient. No  was used.     Review of patient's allergies indicates:   Allergen Reactions    Chloral hydrate      Other reaction(s): Hallucinations  Other reaction(s): Hives    Hydrocodone Other (See Comments)     Mental status changes     Past Medical History:   Diagnosis Date    Anemia in ESRD (end-stage  renal disease) 10/12/2015    Chronic rejection of liver transplant 3/22/2016    ESRD on hemodialysis 2015    History of splenomegaly 2016    Immunosuppressed 2017    Iron deficiency anemia secondary to inadequate dietary iron intake 2017    She [facility] iron [facility] the [facility].   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 ()    Moderate protein-calorie malnutrition 2017    MRSA bacteremia 2017    Prophylactic immunotherapy 2014    Renovascular hypertension 10/2/2015    Secondary hyperparathyroidism 2017    Thrombocytopenia 2016     Past Surgical History:   Procedure Laterality Date     SECTION      2     KIDNEY TRANSPLANT      LIVER BIOPSY      LIVER TRANSPLANT  1992    TUBAL LIGATION       Family History   Problem Relation Age of Onset    Hypertension Mother     Hypertension Father     Melanoma Neg Hx      Social History   Substance Use Topics    Smoking status: Never Smoker    Smokeless tobacco: Never Used    Alcohol use No     Review of Systems   Constitutional: Negative for chills and fever.   HENT: Negative for congestion and sore throat.    Respiratory: Positive for shortness of breath.    Cardiovascular: Negative for chest pain.   Gastrointestinal: Positive for abdominal distention and abdominal pain. Negative for constipation and nausea.   Genitourinary: Positive for dysuria. Negative for flank pain, hematuria, menstrual problem, pelvic pain, urgency, vaginal bleeding and vaginal discharge.   Musculoskeletal: Negative for back pain.   Skin: Negative for rash.   Neurological: Negative for weakness.   Hematological: Does not bruise/bleed easily.       Physical Exam     Initial Vitals [18 0120]   BP Pulse Resp Temp SpO2   (!) 230/160 90 18 98.1 °F (36.7 °C) 97 %      MAP       183.33         Physical Exam    Vitals reviewed.  Constitutional: She appears well-developed and  well-nourished.   HENT:   Head: Normocephalic and atraumatic.   Nose: Nose normal.   Mouth/Throat: No oropharyngeal exudate.   Eyes: EOM are normal. Pupils are equal, round, and reactive to light.   Neck: Normal range of motion. Neck supple. No JVD present.   Cardiovascular: Regular rhythm and normal heart sounds. Exam reveals no gallop and no friction rub.    No murmur heard.  Pulmonary/Chest: Breath sounds normal. No stridor. No respiratory distress. She has no wheezes. She has no rhonchi. She has no rales. She exhibits no tenderness.   Abdominal: Soft. Bowel sounds are normal. She exhibits distension. She exhibits no mass. There is no tenderness. There is no rebound and no guarding.   Musculoskeletal: Normal range of motion. She exhibits no edema or tenderness.   Neurological: She is alert and oriented to person, place, and time. She has normal strength. No sensory deficit.   Skin: Skin is warm and dry.   Psychiatric: She has a normal mood and affect. Thought content normal.         ED Course   Procedures  Labs Reviewed   CBC W/ AUTO DIFFERENTIAL - Abnormal; Notable for the following:        Result Value    WBC 3.49 (*)     RBC 3.48 (*)     Hemoglobin 9.1 (*)     Hematocrit 27.3 (*)     MCV 78 (*)     MCH 26.1 (*)     RDW 17.3 (*)     Platelets 67 (*)     Lymph # 0.6 (*)     Mono # 0.2 (*)     Lymph% 16.9 (*)     All other components within normal limits   URINALYSIS - Abnormal; Notable for the following:     Protein, UA 2+ (*)     Glucose, UA 2+ (*)     Occult Blood UA 1+ (*)     Leukocytes, UA 2+ (*)     All other components within normal limits   COMPREHENSIVE METABOLIC PANEL - Abnormal; Notable for the following:     CO2 17 (*)     BUN, Bld 76 (*)     Creatinine 14.9 (*)     Albumin 2.8 (*)     Alkaline Phosphatase 636 (*)     AST 50 (*)     ALT 45 (*)     Anion Gap 18 (*)     eGFR if  3 (*)     eGFR if non  3 (*)     All other components within normal limits   PHOSPHORUS -  Abnormal; Notable for the following:     Phosphorus 8.0 (*)     All other components within normal limits   LIPASE - Abnormal; Notable for the following:     Lipase 65 (*)     All other components within normal limits   URINALYSIS MICROSCOPIC - Abnormal; Notable for the following:     RBC, UA 20 (*)     WBC, UA 20 (*)     Trichomonas, UA Occasional (*)     All other components within normal limits   CULTURE, URINE   HCG, QUANTITATIVE, PREGNANCY   MAGNESIUM   POCT URINE PREGNANCY        Imaging Results          CT Abdomen Pelvis  Without Contrast (Final result)  Result time 01/17/18 02:49:14    Final result by Sage Donato MD (01/17/18 02:49:14)                 Impression:      1.  Postsurgical changes consistent with liver transplantation with splenomegaly and moderate volume ascites.    2.  Otherwise no acute intra-abdominal abnormalities identified.    3.  Small bilateral pleural effusions, right greater than left with worsening patchy opacification/consolidation within the lower lobes may reflect worsening pulmonary edema versus atypical pneumonia.    4.  Additional stable findings as detailed above.        Electronically signed by: SAGE DONATO MD  Date:     01/17/18  Time:    02:49              Narrative:    Clinical indication: 27-year-old female with generalized abdominal pain.    Comparison: CT abdomen and pelvis 1/3/18.    Technique: Transaxial images were obtained through the abdomen and pelvis in 5 mm increments without the use of p.o. or IV contrast.    Findings:  The visualized portion of the heart is unremarkable.  Small bilateral pleural effusions are visualized, right greater than left resulting in mild volume loss within the lower lobes.  There is worsening patchy opacification/consolidation seen within the visualized lung bases which may reflect pulmonary edema versus atypical pneumonia.  Stable 7 mm nodule is visualized within the right middle lobe.    Postsurgical changes are visualized  consistent with liver transplantation.  No significant hepatic focal abnormalities are seen on this noncontrast exam.   Gallbladder has been removed.  Spleen is enlarged measuring 17.8 cm.  There is moderate volume ascites, not significantly changed from recent CT.  Stomach, pancreas, and adrenal glands show no significant abnormalities.    Kidneys are small consistent with chronic medical renal disease.  No evidence of hydronephrosis.  Ureters are difficult to track.  Urinary bladder is nondistended.  Uterus is unremarkable.      Appendix is visualized and is unremarkable.  The visualized loops of small and large bowel show no evidence of obstruction or inflammation.  No free air.    Aorta tapers normally.     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  Redemonstration of multifocal prominent lucencies seen involving inferior and superior endplates throughout the visualized lower thoracic and lumbar spines.  Subcutaneous soft tissue structures are unremarkable.                                 Medical Decision Making:   History:   Old Medical Records: I decided to obtain old medical records.  Initial Assessment:   Medical decision-making:    The patient received a medical screening exam. If performed, the EKG was independently evaluated by me and is pending final cardiology evaluation.  If performed, all radiographic studies were independently evaluated by me and are pending final radiology evaluation. If labs were ordered, they were reviewed. Vital signs are independently assessed by me.  If performed, the pulse oximetry was independently evaluated by me.  I decided to obtain the patient's past medical record.  If available, I reviewed the patient's past medical record, including most recent labs and radiology reports.    ED Management:  Abdominal Pain: CT scan without evidence of acute appendicitis, cholecystitis, ruptured hollow viscus, bowel obstruction or colitis.  Moderate amount  of ascites is visualized.  Consider, but doubt SBP. This ascites is most likely due to her volume overload status.  Encouraged patient to take off more fluid during her dialysis.  She is following up with her primary care and liver specialist today.  Patient also shows evidence for urinary tract infection and there is trichomonas in the urine.  Will cover with Keflex and Flagyl.  Advised to use condoms and practice safe sex.  Hypertension: Put patient has volume overload.  This is resulted in renovascular hypertension and portal hypertension.  She should discuss her dialysis requirements with her liver specialist and renal specialist and primary care physician.  Take antihypertensives as prescribed.  Patient reports her pain has drastically improved.  Patient is in no respiratory distress.  Room air saturations 100%.  No nausea or vomiting at this time.  Will prescribe Zofran as needed for nausea.  Patient has close follow-up in several hours with her specialists.  Patient was given strict return precautions.  Mother is at bedside and all of these were reviewed with her and she has no further questions as well.  Pain is now resolved.  Patient is resting comfortably.    The results and physical exam findings were reviewed with the patient. Pt agrees with assessment, disposition and treatment plan and has no further questions or complaints at this time.    STEVE Segal M.D. 3:16 AM 1/17/2018              Scribe Attestation:   Scribe #1: I performed the above scribed service and the documentation accurately describes the services I performed. I attest to the accuracy of the note.    Attending Attestation:           Physician Attestation for Scribe:  Physician Attestation Statement for Scribe #1: I, Des Segal MD, reviewed documentation, as scribed by Scottie Daily in my presence, and it is both accurate and complete.                 ED Course      Clinical Impression:   The primary encounter diagnosis  was Acute cystitis without hematuria. Diagnoses of Elevated liver enzymes, Abdominal pain, unspecified abdominal location, Hypervolemia, unspecified hypervolemia type, and Other ascites were also pertinent to this visit.                           Des Segal MD  01/17/18 0317       Des Segal MD  01/17/18 0338

## 2018-01-17 NOTE — Clinical Note
Dr Bass, she has new onset ascites. Do you think her HD can be changed to removed some more fluid or could she have some diuretics?

## 2018-01-18 ENCOUNTER — TELEPHONE (OUTPATIENT)
Dept: TRANSPLANT | Facility: CLINIC | Age: 28
End: 2018-01-18

## 2018-01-18 ENCOUNTER — HOSPITAL ENCOUNTER (EMERGENCY)
Facility: HOSPITAL | Age: 28
Discharge: HOME | End: 2018-01-18
Attending: FAMILY MEDICINE
Payer: MEDICARE

## 2018-01-18 VITALS
WEIGHT: 134 LBS | BODY MASS INDEX: 22.33 KG/M2 | TEMPERATURE: 99 F | RESPIRATION RATE: 16 BRPM | SYSTOLIC BLOOD PRESSURE: 234 MMHG | DIASTOLIC BLOOD PRESSURE: 146 MMHG | HEART RATE: 95 BPM | HEIGHT: 65 IN | OXYGEN SATURATION: 97 %

## 2018-01-18 DIAGNOSIS — D84.9 IMMUNOSUPPRESSION: ICD-10-CM

## 2018-01-18 DIAGNOSIS — Z94.4 H/O LIVER TRANSPLANT: ICD-10-CM

## 2018-01-18 DIAGNOSIS — R10.13 EPIGASTRIC ABDOMINAL PAIN: Primary | ICD-10-CM

## 2018-01-18 DIAGNOSIS — R19.00 ABDOMINAL SWELLING: ICD-10-CM

## 2018-01-18 DIAGNOSIS — N18.6 ESRD ON DIALYSIS: ICD-10-CM

## 2018-01-18 DIAGNOSIS — Z99.2 ESRD ON DIALYSIS: ICD-10-CM

## 2018-01-18 LAB
ALBUMIN SERPL BCP-MCNC: 2.7 G/DL
ALP SERPL-CCNC: 635 U/L
ALT SERPL W/O P-5'-P-CCNC: 39 U/L
ANION GAP SERPL CALC-SCNC: ABNORMAL MMOL/L
AST SERPL-CCNC: 36 U/L
B-HCG UR QL: NEGATIVE
BACTERIA #/AREA URNS AUTO: ABNORMAL /HPF
BASOPHILS # BLD AUTO: 0.01 K/UL
BASOPHILS NFR BLD: 0.3 %
BILIRUB SERPL-MCNC: 0.5 MG/DL
BILIRUB UR QL STRIP: NEGATIVE
BUN SERPL-MCNC: 84 MG/DL
CALCIUM SERPL-MCNC: 8.3 MG/DL
CHLORIDE SERPL-SCNC: 103 MMOL/L
CLARITY UR REFRACT.AUTO: ABNORMAL
CO2 SERPL-SCNC: ABNORMAL MMOL/L
COLOR UR AUTO: YELLOW
CREAT SERPL-MCNC: 17.7 MG/DL
CTP QC/QA: YES
DIFFERENTIAL METHOD: ABNORMAL
EOSINOPHIL # BLD AUTO: 0.2 K/UL
EOSINOPHIL NFR BLD: 7.8 %
ERYTHROCYTE [DISTWIDTH] IN BLOOD BY AUTOMATED COUNT: 16.9 %
EST. GFR  (AFRICAN AMERICAN): 2.8 ML/MIN/1.73 M^2
EST. GFR  (NON AFRICAN AMERICAN): 2.4 ML/MIN/1.73 M^2
GLUCOSE SERPL-MCNC: 121 MG/DL
GLUCOSE UR QL STRIP: ABNORMAL
HCT VFR BLD AUTO: 28.5 %
HGB BLD-MCNC: 9.1 G/DL
HGB UR QL STRIP: ABNORMAL
HYALINE CASTS UR QL AUTO: 0 /LPF
IMM GRANULOCYTES # BLD AUTO: 0.02 K/UL
IMM GRANULOCYTES NFR BLD AUTO: 0.6 %
INR PPP: 1.1
KETONES UR QL STRIP: NEGATIVE
LACTATE SERPL-SCNC: 1 MMOL/L
LEUKOCYTE ESTERASE UR QL STRIP: ABNORMAL
LYMPHOCYTES # BLD AUTO: 0.7 K/UL
LYMPHOCYTES NFR BLD: 22.7 %
MAGNESIUM SERPL-MCNC: 2.5 MG/DL
MCH RBC QN AUTO: 25.7 PG
MCHC RBC AUTO-ENTMCNC: 31.9 G/DL
MCV RBC AUTO: 81 FL
MICROSCOPIC COMMENT: ABNORMAL
MONOCYTES # BLD AUTO: 0.1 K/UL
MONOCYTES NFR BLD: 4.2 %
NEUTROPHILS # BLD AUTO: 2 K/UL
NEUTROPHILS NFR BLD: 64.4 %
NITRITE UR QL STRIP: NEGATIVE
NRBC BLD-RTO: 0 /100 WBC
PH UR STRIP: 7 [PH] (ref 5–8)
PHOSPHATE SERPL-MCNC: 7.6 MG/DL
PLATELET # BLD AUTO: 60 K/UL
PMV BLD AUTO: ABNORMAL FL
POTASSIUM SERPL-SCNC: 4.9 MMOL/L
PROT SERPL-MCNC: 7.5 G/DL
PROT UR QL STRIP: ABNORMAL
PROTHROMBIN TIME: 11.2 SEC
RBC # BLD AUTO: 3.54 M/UL
RBC #/AREA URNS AUTO: 21 /HPF (ref 0–4)
SODIUM SERPL-SCNC: 137 MMOL/L
SP GR UR STRIP: 1.01 (ref 1–1.03)
SQUAMOUS #/AREA URNS AUTO: 9 /HPF
URN SPEC COLLECT METH UR: ABNORMAL
UROBILINOGEN UR STRIP-ACNC: NEGATIVE EU/DL
WBC # BLD AUTO: 3.08 K/UL
WBC #/AREA URNS AUTO: 8 /HPF (ref 0–5)

## 2018-01-18 PROCEDURE — 85025 COMPLETE CBC W/AUTO DIFF WBC: CPT

## 2018-01-18 PROCEDURE — 83605 ASSAY OF LACTIC ACID: CPT

## 2018-01-18 PROCEDURE — 84100 ASSAY OF PHOSPHORUS: CPT

## 2018-01-18 PROCEDURE — 93010 ELECTROCARDIOGRAM REPORT: CPT | Mod: ,,, | Performed by: INTERNAL MEDICINE

## 2018-01-18 PROCEDURE — 81001 URINALYSIS AUTO W/SCOPE: CPT

## 2018-01-18 PROCEDURE — 99284 EMERGENCY DEPT VISIT MOD MDM: CPT | Mod: 25

## 2018-01-18 PROCEDURE — 99284 EMERGENCY DEPT VISIT MOD MDM: CPT | Mod: ,,, | Performed by: PHYSICIAN ASSISTANT

## 2018-01-18 PROCEDURE — 83735 ASSAY OF MAGNESIUM: CPT

## 2018-01-18 PROCEDURE — 25000003 PHARM REV CODE 250: Performed by: PHYSICIAN ASSISTANT

## 2018-01-18 PROCEDURE — 81025 URINE PREGNANCY TEST: CPT | Performed by: FAMILY MEDICINE

## 2018-01-18 PROCEDURE — 80053 COMPREHEN METABOLIC PANEL: CPT

## 2018-01-18 PROCEDURE — 85610 PROTHROMBIN TIME: CPT

## 2018-01-18 PROCEDURE — 93005 ELECTROCARDIOGRAM TRACING: CPT

## 2018-01-18 RX ORDER — OXYCODONE HYDROCHLORIDE 5 MG/1
5 TABLET ORAL EVERY 4 HOURS PRN
Qty: 12 TABLET | Refills: 0 | Status: SHIPPED | OUTPATIENT
Start: 2018-01-18 | End: 2018-05-18

## 2018-01-18 RX ORDER — OXYCODONE HYDROCHLORIDE 5 MG/1
10 TABLET ORAL
Status: COMPLETED | OUTPATIENT
Start: 2018-01-18 | End: 2018-01-18

## 2018-01-18 RX ORDER — ONDANSETRON 2 MG/ML
4 INJECTION INTRAMUSCULAR; INTRAVENOUS
Status: DISCONTINUED | OUTPATIENT
Start: 2018-01-18 | End: 2018-01-18 | Stop reason: HOSPADM

## 2018-01-18 RX ADMIN — OXYCODONE HYDROCHLORIDE 10 MG: 5 TABLET ORAL at 05:01

## 2018-01-18 NOTE — TELEPHONE ENCOUNTER
"Returned call to pt, advised that Dr. Pinedo mentioned her needing a biopsy but this would not be scheduled until next week sometime, once cleared. Patient became very nasty on phone and said "well fine, if you won't do anything for me, I will just go back to the hospital."  Advised patient that there was no reason to yell and be ugly. Asked pt what Dr. Pinedo reviewed with her clinic and suggested and she said "nothing, you guys aren't doing anything for me."  Advised pt that we do not know why she has abdominal pain and we are not even sure it is liver related.  Advised that she needs to do what she thinks is best for herself at this time and if she wants to go to the ED, we will not prevent her from going.   "

## 2018-01-18 NOTE — ED PROVIDER NOTES
"Encounter Date: 1/18/2018    SCRIBE #1 NOTE: I, Idalia Ayers, am scribing for, and in the presence of,  Evelia Garcia PA-C. I have scribed the entire note.       History     Chief Complaint   Patient presents with    Abdominal Pain     Pt c/o stomach pain.  Pt has been seen several times for same.      Time patient was seen by the provider: 3:57 PM      The patient is a 27 y.o. 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 Patient has been seen several times for similar symptoms. Patient was seen by liver doctor yesterday and will have a liver biopsy and diagnostic paracentesis scheduled. She describes her current pain as 6/10 in severity, but notes it is a, "sharp," pain that worsens at night. She endorses SOB since yesterday and a minimal cough. She denies fever, chills, and chest pain. Patient notes she did not do her dialysis this morning due to coming to the ER. She is compliant with medications.       The history is provided by the patient and medical records.     Review of patient's allergies indicates:   Allergen Reactions    Chloral hydrate      Other reaction(s): Hallucinations  Other reaction(s): Hives    Hydrocodone Other (See Comments)     Mental status changes     Past Medical History:   Diagnosis Date    Anemia in ESRD (end-stage renal disease) 10/12/2015    Chronic rejection of liver transplant 3/22/2016    ESRD on hemodialysis 9/30/2015    History of splenomegaly 4/12/2016    Immunosuppressed 8/5/2017    Iron deficiency anemia secondary to inadequate dietary iron intake 8/16/2017    She [facility] iron [facility].   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)    Moderate protein-calorie malnutrition " 2017    MRSA bacteremia 2017    Prophylactic immunotherapy 2014    Renovascular hypertension 10/2/2015    Secondary hyperparathyroidism 2017    Thrombocytopenia 2016     Past Surgical History:   Procedure Laterality Date     SECTION      2     KIDNEY TRANSPLANT      LIVER BIOPSY      LIVER TRANSPLANT  1992    TUBAL LIGATION       Family History   Problem Relation Age of Onset    Hypertension Mother     Hypertension Father     Melanoma Neg Hx      Social History   Substance Use Topics    Smoking status: Never Smoker    Smokeless tobacco: Never Used    Alcohol use No     Review of Systems   Constitutional: Negative for chills and fever.   HENT: Negative for sore throat.    Respiratory: Positive for cough (minimal) and shortness of breath.    Cardiovascular: Negative for chest pain.   Gastrointestinal: Positive for abdominal pain (epigastric and mid lower). Negative for nausea.   Genitourinary: Negative for dysuria.   Musculoskeletal: Negative for back pain.   Skin: Negative for rash.   Neurological: Negative for weakness.   Hematological: Does not bruise/bleed easily.       Physical Exam     Initial Vitals [18 1341]   BP Pulse Resp Temp SpO2   (!) 234/151 102 18 99.1 °F (37.3 °C) 99 %      MAP       178.67         Physical Exam    Nursing note and vitals reviewed.  Constitutional: No distress.   HENT:   Mouth/Throat: Oropharynx is clear and moist. No oropharyngeal exudate.   Eyes: EOM are normal. Pupils are equal, round, and reactive to light.   Neck: Normal range of motion. Neck supple.   Cardiovascular: Normal rate, regular rhythm and normal heart sounds.   No murmur heard.  Pulmonary/Chest: Breath sounds normal. She has no wheezes. She has no rhonchi. She has no rales.   Abdominal: Soft. Bowel sounds are normal. There is no tenderness. There is no rebound and no guarding.   Mild epigastric tenderness and mild to moderate abdominal distension.     Musculoskeletal: She exhibits no edema.   Neurological: She is alert and oriented to person, place, and time. She has normal strength. No cranial nerve deficit or sensory deficit.   Skin: No rash noted.         ED Course   Procedures  Labs Reviewed   CBC W/ AUTO DIFFERENTIAL - Abnormal; Notable for the following:        Result Value    WBC 3.08 (*)     RBC 3.54 (*)     Hemoglobin 9.1 (*)     Hematocrit 28.5 (*)     MCV 81 (*)     MCH 25.7 (*)     MCHC 31.9 (*)     RDW 16.9 (*)     Platelets 60 (*)     Immature Granulocytes 0.6 (*)     Lymph # 0.7 (*)     Mono # 0.1 (*)     All other components within normal limits   COMPREHENSIVE METABOLIC PANEL - Abnormal; Notable for the following:     Glucose 121 (*)     BUN, Bld 84 (*)     Creatinine 17.7 (*)     Calcium 8.3 (*)     Albumin 2.7 (*)     Alkaline Phosphatase 635 (*)     eGFR if  2.8 (*)     eGFR if non  2.4 (*)     All other components within normal limits   PHOSPHORUS - Abnormal; Notable for the following:     Phosphorus 7.6 (*)     All other components within normal limits   URINALYSIS, REFLEX TO URINE CULTURE - Abnormal; Notable for the following:     Appearance, UA Hazy (*)     Protein, UA 3+ (*)     Glucose, UA 2+ (*)     Occult Blood UA 1+ (*)     Leukocytes, UA 2+ (*)     All other components within normal limits    Narrative:     Preferred Collection Type->Urine, Clean Catch   URINALYSIS MICROSCOPIC - Abnormal; Notable for the following:     RBC, UA 21 (*)     WBC, UA 8 (*)     All other components within normal limits    Narrative:     Preferred Collection Type->Urine, Clean Catch   MAGNESIUM   LACTIC ACID, PLASMA   PROTIME-INR   POCT URINE PREGNANCY             Medical Decision Making:   History:   Old Medical Records: I decided to obtain old medical records.  Clinical Tests:   Lab Tests: Ordered and Reviewed  Radiological Study: Ordered and Reviewed       APC / Resident Notes:   Patient presents to the ER for evaluation  "of abdominal pain. Patient says this pain is new for 2 weeks since she has developed abdominal swelling.       I reviewed patient's chart. She was seen in ED yesterday with complaint of abdominal pain and distention for 1 week. She had a CT of abdomen which showed post surgical changes consistent with liver transplant. There was splenomegaly and moderate volume ascites. No other acute process. She was given Keflex and Flagyl for treatment for UTI and Trich. She was given Rocephin, Dilaudid, Hydralazine, Clonidine in the ED yesterday.  She was also seen by liver transplant clinic, Dr. Law, yesterday.  He noted that her new ascites is most likely 2/2 worsening liver disease.  Plan was to schedule "diagnostic para,  Abdominal US with doppler to check patency of portal vein, may need to repeat liver biopsy and measure HVPG"      Patient again has elevated blood pressure, but says this is normal for her.   Today, her labs are consistent with baseline. Potassium is WNL and we do not see an indication for emergent dialysis.  She is advised to resume HD at home.      Patient was admitted from 1/3/18-1/5/18 due to UTI as she is immunosuppressed.  She did report abdominal pain and swelling at that time.  She was treated with Rocephin and Augmentin x 5 days. Patient was seen by hepatology and they advised to continue Prograf and Prednisone. She began treatment again yesterday for UTI and is currently taking Keflex. Urine culture taken yesterday  Shows prelim "no significant isolant to date".   Patient does feel improved with Oxycodone given in the ER. Labs are consistent with baseline recent labs. Lactic acid is negative. Her exam is not concerning for SBP.  Her symptoms are the same as when she saw her Liver transplant physician yesterday.  I discussed with my supervising MD, Dr. Murphy and Dr. Vera and we feel she is stable for discharge with plan to schedule outpatient diagnostic paracentesis and liver biopsy.   " The patient is comfortable with this plan and we will prescribe #12 Oxycodone IR to take as needed for her pain at night. She is given ER return precautions.      I, Evelia Garcia, personally performed the services described in this documentation. All medical record entries made by the scribe were at my direction and in my presence.  I have reviewed the chart and agree that the record reflects my personal performance and is accurate and complete. Evelia Garcia, APC.  8:36 PM 01/18/2018         Scribe Attestation:   Scribe #1: I performed the above scribed service and the documentation accurately describes the services I performed. I attest to the accuracy of the note.            ED Course      Clinical Impression:   The primary encounter diagnosis was Epigastric abdominal pain. Diagnoses of Abdominal swelling, ESRD on dialysis, and H/O liver transplant were also pertinent to this visit.    Disposition:   Disposition: Discharged  Condition: Stable                        IGLESIA Diaz  01/18/18 2040

## 2018-01-18 NOTE — TELEPHONE ENCOUNTER
----- Message from Cleo Velasco sent at 1/18/2018 11:44 AM CST -----  Contact: Pt  Would like a call regarding scheduling her biopsy.    She would like to know when it will be scheduled.     Call her @ [phone]

## 2018-01-18 NOTE — TELEPHONE ENCOUNTER
----- Message from Aga Carr sent at 1/18/2018 12:41 PM CST -----  Contact: Yulissa (sister)  Pt would like to know if pain medication can be prescribed to her? She is currently on her way to the ER due to her being in pain    Contact number [phone]  Thanks

## 2018-01-18 NOTE — ED TRIAGE NOTES
Presents to ER with generalized abdominal pain and distention for one week.  Was seen [facility] yesterday and is to have a liver bx scheduled.  Patient also on dialysis and has not been able to have dialysis since Tuesday do to inclement weather conditions.    Pt identifiers checked and correct  LOC: The patient is awake, alert, aware of environment with an appropriate affect. Oriented x3, speaking appropriately  APPEARANCE: Pt resting comfortably, in no acute distress, pt is clean and well groomed, clothing properly fastened  SKIN: Skin warm, dry and intact, normal skin turgor, moist mucus membranes  RESPIRATORY: Airway is open and patent, respirations are spontaneous, even and unlabored, normal effort and rate  MUSCULOSKELETAL: No obvious deformities.

## 2018-01-18 NOTE — TELEPHONE ENCOUNTER
Returned call, spoke with pt's sister. Advised that we do not prescribed narcotics or pain medications post transplant. Discussed that she saw the doctor yesterday, and he did not advise her to take pain medications. Advised that if she is on that much pain, should go to ED.

## 2018-01-18 NOTE — ED NOTES
"Pt refused GI cocktail and zofran. IGLESIA Altamirano notified. Pt states she wants pain medication. She states she not nauseous and that the GI cocktail is "nasty".   "

## 2018-01-19 ENCOUNTER — TELEPHONE (OUTPATIENT)
Dept: TRANSPLANT | Facility: CLINIC | Age: 28
End: 2018-01-19

## 2018-01-19 DIAGNOSIS — R18.8 OTHER ASCITES: Primary | ICD-10-CM

## 2018-01-19 DIAGNOSIS — Z94.4 LIVER TRANSPLANTED: ICD-10-CM

## 2018-01-19 LAB — BACTERIA UR CULT: NORMAL

## 2018-01-19 NOTE — TELEPHONE ENCOUNTER
----- Message from Naya Vincent sent at 1/18/2018  3:54 PM CST -----      ----- Message -----  From: Danna Samaniego RN  Sent: 1/18/2018   1:33 PM  To: Naya Vincent    I am unsure what to do with this. Sorry . Danna  ----- Message -----  From: Naya Vincent  Sent: 1/17/2018   5:46 PM  To: [facility] Post-Kidney Transplant Clinical        ----- Message -----  From: Lei Pinedo MD  Sent: 1/17/2018   4:46 PM  To: [facility] Pre-Liver Transplant Clinical    Dr Bass, she has new onset ascites. Do you think her HD can be changed to removed some more fluid or could she have some diuretics?

## 2018-01-19 NOTE — ED NOTES
Patient had a 20 g IV to right forearm that I D/C per policy prior to D/C. Catheter remained intact.

## 2018-01-19 NOTE — TELEPHONE ENCOUNTER
----- Message from Lei Pinedo MD sent at 1/19/2018  9:22 AM CST -----  Results reviewed  Did she take it before labs?

## 2018-01-19 NOTE — TELEPHONE ENCOUNTER
Message and clinic encounter sent to Dr. Bass for review and assistance per request from Dr. Pinedo.    Per Dr. Pinedo, pt to have WBC, albumin, and cultures done with diagnostic para. Orders placed. Awaiting call back from IR to schedule for asap.  Biopsy to be scheduled after para scheduled.     Spoke with pt discussed above and that I will call her back with appointments so she can make arrangements for rides and caregivers. She agreed with plan.

## 2018-01-19 NOTE — TELEPHONE ENCOUNTER
Pt took medications prior to lab draw per pt's report.    She agreed to repeat full, fasting labs Monday, 1/22. She agreed to u/s at 2:45 Tuesday, and have diagnostic para Wednesday at 11.  Pt aware that for a diagnostic para, they will only drain enough fluid to be able to test for infections.  Advised that once we have the results of these tests and the biopsy, we can set her up for a therapeutic para, if needed. She verbalized understanding.    Also discussed that we have a tentative TJ biopsy scheduled for Monday, 1/29 at 8. Explained what a TJ biopsy is and how it is done. Also explained that we take samples for biopsy but will also look at pressure measurements to see if there is a vascular issue causing the ascites.     Reviewed with pt that she needs to be 100% compliant with apts and medications (incuding BP medications). Advised that neither the para or the TJ biopsy will be done if her BP is way out of control like it has been. Pt states she understands and will make sure she takes her medications as instructed.

## 2018-01-22 ENCOUNTER — DOCUMENTATION ONLY (OUTPATIENT)
Dept: NEPHROLOGY | Facility: CLINIC | Age: 28
End: 2018-01-22

## 2018-01-22 ENCOUNTER — LAB VISIT (OUTPATIENT)
Dept: LAB | Facility: HOSPITAL | Age: 28
End: 2018-01-22
Attending: INTERNAL MEDICINE
Payer: MEDICARE

## 2018-01-22 DIAGNOSIS — Z94.4 LIVER TRANSPLANTED: ICD-10-CM

## 2018-01-22 LAB
ALBUMIN SERPL BCP-MCNC: 2.4 G/DL
ALP SERPL-CCNC: 533 U/L
ALT SERPL W/O P-5'-P-CCNC: 26 U/L
ANION GAP SERPL CALC-SCNC: 14 MMOL/L
AST SERPL-CCNC: 32 U/L
BASOPHILS # BLD AUTO: 0.01 K/UL
BASOPHILS NFR BLD: 0.3 %
BILIRUB SERPL-MCNC: 0.4 MG/DL
BUN SERPL-MCNC: 59 MG/DL
CALCIUM SERPL-MCNC: 8.9 MG/DL
CHLORIDE SERPL-SCNC: 100 MMOL/L
CO2 SERPL-SCNC: 25 MMOL/L
CREAT SERPL-MCNC: 14.4 MG/DL
DIFFERENTIAL METHOD: ABNORMAL
EOSINOPHIL # BLD AUTO: 0.3 K/UL
EOSINOPHIL NFR BLD: 8.2 %
ERYTHROCYTE [DISTWIDTH] IN BLOOD BY AUTOMATED COUNT: 16.7 %
EST. GFR  (AFRICAN AMERICAN): 3.5 ML/MIN/1.73 M^2
EST. GFR  (NON AFRICAN AMERICAN): 3.1 ML/MIN/1.73 M^2
GLUCOSE SERPL-MCNC: 147 MG/DL
HCT VFR BLD AUTO: 27.2 %
HGB BLD-MCNC: 8.7 G/DL
IMM GRANULOCYTES # BLD AUTO: 0.02 K/UL
IMM GRANULOCYTES NFR BLD AUTO: 0.6 %
LYMPHOCYTES # BLD AUTO: 0.5 K/UL
LYMPHOCYTES NFR BLD: 15.7 %
MCH RBC QN AUTO: 25.8 PG
MCHC RBC AUTO-ENTMCNC: 32 G/DL
MCV RBC AUTO: 81 FL
MONOCYTES # BLD AUTO: 0.2 K/UL
MONOCYTES NFR BLD: 4.8 %
NEUTROPHILS # BLD AUTO: 2.3 K/UL
NEUTROPHILS NFR BLD: 70.4 %
NRBC BLD-RTO: 0 /100 WBC
PLATELET # BLD AUTO: 57 K/UL
PMV BLD AUTO: ABNORMAL FL
POTASSIUM SERPL-SCNC: 4 MMOL/L
PROT SERPL-MCNC: 6.5 G/DL
RBC # BLD AUTO: 3.37 M/UL
SODIUM SERPL-SCNC: 139 MMOL/L
TACROLIMUS BLD-MCNC: 2 NG/ML
WBC # BLD AUTO: 3.31 K/UL

## 2018-01-22 PROCEDURE — 36415 COLL VENOUS BLD VENIPUNCTURE: CPT | Mod: PO

## 2018-01-22 PROCEDURE — 85025 COMPLETE CBC W/AUTO DIFF WBC: CPT

## 2018-01-22 PROCEDURE — 80053 COMPREHEN METABOLIC PANEL: CPT

## 2018-01-22 PROCEDURE — 80197 ASSAY OF TACROLIMUS: CPT

## 2018-01-22 RX ORDER — CLONIDINE 0.2 MG/24H
1 PATCH, EXTENDED RELEASE TRANSDERMAL
Qty: 4 PATCH | Refills: 11 | Status: SHIPPED | OUTPATIENT
Start: 2018-01-22 | End: 2018-03-07 | Stop reason: SDUPTHER

## 2018-01-22 NOTE — TELEPHONE ENCOUNTER
Spoke with pt, reviewed that LFTs overall stable. Advised that Dr. Pinedo wants her to increase her prograf to 6mg BID starting tonight and repeat labs Monday, 1/29/18.    Discussed how she dialyzes because her creatinine is so high - >14. Pt states she does home HD Sat, Sun, Tues and Thurs. Discussed that I am concerned that they may not be doing something right because her creatinine is way too high for just having had HD yesterday.  Advised that she contact Dr. Bass.

## 2018-01-22 NOTE — TELEPHONE ENCOUNTER
----- Message from Lei Pinedo MD sent at 1/22/2018  3:29 PM CST -----  Results reviewed  Increase tac to 6/6

## 2018-01-22 NOTE — TELEPHONE ENCOUNTER
----- Message from Lei Pinedo MD sent at 1/22/2018  3:36 PM CST -----  Continue with TJ and pressure measurements    ----- Message -----  From: Violeta Francis RN  Sent: 1/22/2018   3:16 PM  To: Lei Pinedo MD    She is having diagnostic para Wednesday.    Her creatinine is so high - she can't be compliant with HD, right?    I scheduled a TJ liver biopsy with pressure measurements on Monday, 1/29 if you still want her to have that.    Violeta    ----- Message -----  From: Lei Pinedo MD  Sent: 1/22/2018   3:12 PM  To: [facility] Post-Liver Transplant Clinical    Results reviewed

## 2018-01-23 ENCOUNTER — HOSPITAL ENCOUNTER (OUTPATIENT)
Dept: RADIOLOGY | Facility: HOSPITAL | Age: 28
Discharge: HOME | End: 2018-01-23
Attending: INTERNAL MEDICINE
Payer: MEDICARE

## 2018-01-23 DIAGNOSIS — R18.8 OTHER ASCITES: ICD-10-CM

## 2018-01-23 DIAGNOSIS — Z94.4 LIVER TRANSPLANTED: Primary | ICD-10-CM

## 2018-01-23 DIAGNOSIS — Z94.4 LIVER TRANSPLANTED: ICD-10-CM

## 2018-01-23 PROCEDURE — 76705 ECHO EXAM OF ABDOMEN: CPT | Mod: 26,59,GC, | Performed by: RADIOLOGY

## 2018-01-23 PROCEDURE — 93975 VASCULAR STUDY: CPT | Mod: 26,GC,, | Performed by: RADIOLOGY

## 2018-01-23 PROCEDURE — 93975 VASCULAR STUDY: CPT | Mod: TC

## 2018-01-23 RX ORDER — TACROLIMUS 1 MG/1
6 CAPSULE ORAL EVERY 12 HOURS
Qty: 360 CAPSULE | Refills: 11 | Status: ON HOLD | OUTPATIENT
Start: 2018-01-23 | End: 2018-01-26

## 2018-01-24 ENCOUNTER — HOSPITAL ENCOUNTER (OUTPATIENT)
Facility: HOSPITAL | Age: 28
Discharge: HOME | End: 2018-01-26
Attending: EMERGENCY MEDICINE | Admitting: HOSPITALIST
Payer: MEDICARE

## 2018-01-24 ENCOUNTER — TELEPHONE (OUTPATIENT)
Dept: TRANSPLANT | Facility: CLINIC | Age: 28
End: 2018-01-24

## 2018-01-24 ENCOUNTER — HOSPITAL ENCOUNTER (OUTPATIENT)
Dept: INTERVENTIONAL RADIOLOGY/VASCULAR | Facility: HOSPITAL | Age: 28
Discharge: HOME | End: 2018-01-24
Attending: INTERNAL MEDICINE
Payer: MEDICARE

## 2018-01-24 VITALS
HEART RATE: 88 BPM | SYSTOLIC BLOOD PRESSURE: 202 MMHG | RESPIRATION RATE: 16 BRPM | DIASTOLIC BLOOD PRESSURE: 134 MMHG | OXYGEN SATURATION: 96 %

## 2018-01-24 DIAGNOSIS — Z94.4 LIVER REPLACED BY TRANSPLANT: Chronic | ICD-10-CM

## 2018-01-24 DIAGNOSIS — I10 UNCONTROLLED HYPERTENSION: Primary | ICD-10-CM

## 2018-01-24 DIAGNOSIS — N18.6 ESRD ON HEMODIALYSIS: Chronic | ICD-10-CM

## 2018-01-24 DIAGNOSIS — E87.5 HYPERKALEMIA: ICD-10-CM

## 2018-01-24 DIAGNOSIS — Z99.2 ESRD ON HEMODIALYSIS: Chronic | ICD-10-CM

## 2018-01-24 DIAGNOSIS — Z29.89 PROPHYLACTIC IMMUNOTHERAPY: ICD-10-CM

## 2018-01-24 DIAGNOSIS — N18.6 ANEMIA IN ESRD (END-STAGE RENAL DISEASE): Chronic | ICD-10-CM

## 2018-01-24 DIAGNOSIS — Z94.4 LIVER TRANSPLANTED: ICD-10-CM

## 2018-01-24 DIAGNOSIS — I15.0 RENOVASCULAR HYPERTENSION: ICD-10-CM

## 2018-01-24 DIAGNOSIS — R07.9 CHEST PAIN: ICD-10-CM

## 2018-01-24 DIAGNOSIS — R18.8 OTHER ASCITES: ICD-10-CM

## 2018-01-24 DIAGNOSIS — D63.1 ANEMIA IN ESRD (END-STAGE RENAL DISEASE): Chronic | ICD-10-CM

## 2018-01-24 LAB
ALBUMIN SERPL BCP-MCNC: 2.6 G/DL
ALP SERPL-CCNC: 598 U/L
ALT SERPL W/O P-5'-P-CCNC: 25 U/L
ANION GAP SERPL CALC-SCNC: 15 MMOL/L
AST SERPL-CCNC: 31 U/L
BASOPHILS # BLD AUTO: 0.01 K/UL
BASOPHILS NFR BLD: 0.3 %
BILIRUB SERPL-MCNC: 0.5 MG/DL
BUN SERPL-MCNC: 70 MG/DL
CALCIUM SERPL-MCNC: 8.9 MG/DL
CHLORIDE SERPL-SCNC: 101 MMOL/L
CO2 SERPL-SCNC: 22 MMOL/L
CREAT SERPL-MCNC: 17.3 MG/DL
DIFFERENTIAL METHOD: ABNORMAL
EOSINOPHIL # BLD AUTO: 0.3 K/UL
EOSINOPHIL NFR BLD: 7.8 %
ERYTHROCYTE [DISTWIDTH] IN BLOOD BY AUTOMATED COUNT: 16.9 %
EST. GFR  (AFRICAN AMERICAN): 2.8 ML/MIN/1.73 M^2
EST. GFR  (NON AFRICAN AMERICAN): 2.5 ML/MIN/1.73 M^2
ESTIMATED AVG GLUCOSE: 82 MG/DL
GLUCOSE SERPL-MCNC: 97 MG/DL
HBA1C MFR BLD HPLC: 4.5 %
HCT VFR BLD AUTO: 29.8 %
HGB BLD-MCNC: 9.5 G/DL
IMM GRANULOCYTES # BLD AUTO: 0.03 K/UL
IMM GRANULOCYTES NFR BLD AUTO: 0.8 %
LYMPHOCYTES # BLD AUTO: 0.6 K/UL
LYMPHOCYTES NFR BLD: 15.4 %
MCH RBC QN AUTO: 25.4 PG
MCHC RBC AUTO-ENTMCNC: 31.9 G/DL
MCV RBC AUTO: 80 FL
MONOCYTES # BLD AUTO: 0.3 K/UL
MONOCYTES NFR BLD: 6.3 %
NEUTROPHILS # BLD AUTO: 2.7 K/UL
NEUTROPHILS NFR BLD: 69.4 %
NRBC BLD-RTO: 0 /100 WBC
PLATELET # BLD AUTO: 69 K/UL
PMV BLD AUTO: ABNORMAL FL
POCT GLUCOSE: 112 MG/DL (ref 70–110)
POTASSIUM SERPL-SCNC: 5.6 MMOL/L
PROT SERPL-MCNC: 7.3 G/DL
RBC # BLD AUTO: 3.74 M/UL
SODIUM SERPL-SCNC: 138 MMOL/L
TROPONIN I SERPL DL<=0.01 NG/ML-MCNC: 0.04 NG/ML
WBC # BLD AUTO: 3.95 K/UL

## 2018-01-24 PROCEDURE — 84484 ASSAY OF TROPONIN QUANT: CPT

## 2018-01-24 PROCEDURE — 25000003 PHARM REV CODE 250: Performed by: HOSPITALIST

## 2018-01-24 PROCEDURE — G0257 UNSCHED DIALYSIS ESRD PT HOS: HCPCS

## 2018-01-24 PROCEDURE — 99214 OFFICE O/P EST MOD 30 MIN: CPT | Mod: ,,, | Performed by: INTERNAL MEDICINE

## 2018-01-24 PROCEDURE — 83036 HEMOGLOBIN GLYCOSYLATED A1C: CPT

## 2018-01-24 PROCEDURE — G0378 HOSPITAL OBSERVATION PER HR: HCPCS

## 2018-01-24 PROCEDURE — 80197 ASSAY OF TACROLIMUS: CPT

## 2018-01-24 PROCEDURE — 93010 ELECTROCARDIOGRAM REPORT: CPT | Mod: ,,, | Performed by: INTERNAL MEDICINE

## 2018-01-24 PROCEDURE — 99284 EMERGENCY DEPT VISIT MOD MDM: CPT | Mod: 25

## 2018-01-24 PROCEDURE — 80100014 HC HEMODIALYSIS 1:1

## 2018-01-24 PROCEDURE — 76705 ECHO EXAM OF ABDOMEN: CPT | Mod: TC

## 2018-01-24 PROCEDURE — 80053 COMPREHEN METABOLIC PANEL: CPT

## 2018-01-24 PROCEDURE — 76705 ECHO EXAM OF ABDOMEN: CPT | Mod: 26,,, | Performed by: RADIOLOGY

## 2018-01-24 PROCEDURE — 25000003 PHARM REV CODE 250: Performed by: EMERGENCY MEDICINE

## 2018-01-24 PROCEDURE — 99220 PR INITIAL OBSERVATION CARE,LEVL III: CPT | Mod: AI,,, | Performed by: HOSPITALIST

## 2018-01-24 PROCEDURE — 63600175 PHARM REV CODE 636 W HCPCS: Performed by: HOSPITALIST

## 2018-01-24 PROCEDURE — 93005 ELECTROCARDIOGRAM TRACING: CPT

## 2018-01-24 PROCEDURE — 85025 COMPLETE CBC W/AUTO DIFF WBC: CPT

## 2018-01-24 RX ORDER — NIFEDIPINE 30 MG/1
30 TABLET, EXTENDED RELEASE ORAL DAILY
Status: DISCONTINUED | OUTPATIENT
Start: 2018-01-25 | End: 2018-01-25

## 2018-01-24 RX ORDER — FAMOTIDINE 20 MG/1
20 TABLET, FILM COATED ORAL EVERY 24 HOURS
Status: DISCONTINUED | OUTPATIENT
Start: 2018-01-24 | End: 2018-01-26 | Stop reason: HOSPADM

## 2018-01-24 RX ORDER — DICYCLOMINE HYDROCHLORIDE 20 MG/1
20 TABLET ORAL 2 TIMES DAILY
Status: DISCONTINUED | OUTPATIENT
Start: 2018-01-24 | End: 2018-01-26 | Stop reason: HOSPADM

## 2018-01-24 RX ORDER — GLUCAGON 1 MG
1 KIT INJECTION
Status: DISCONTINUED | OUTPATIENT
Start: 2018-01-24 | End: 2018-01-26 | Stop reason: HOSPADM

## 2018-01-24 RX ORDER — NIFEDIPINE 10 MG/1
10 CAPSULE ORAL
Status: COMPLETED | OUTPATIENT
Start: 2018-01-24 | End: 2018-01-24

## 2018-01-24 RX ORDER — CLONIDINE 0.2 MG/24H
1 PATCH, EXTENDED RELEASE TRANSDERMAL
Status: DISCONTINUED | OUTPATIENT
Start: 2018-01-26 | End: 2018-01-26 | Stop reason: HOSPADM

## 2018-01-24 RX ORDER — PREDNISONE 1 MG/1
1 TABLET ORAL EVERY OTHER DAY
Status: DISCONTINUED | OUTPATIENT
Start: 2018-01-25 | End: 2018-01-26 | Stop reason: HOSPADM

## 2018-01-24 RX ORDER — SODIUM CHLORIDE 9 MG/ML
INJECTION, SOLUTION INTRAVENOUS
Status: DISCONTINUED | OUTPATIENT
Start: 2018-01-24 | End: 2018-01-26 | Stop reason: HOSPADM

## 2018-01-24 RX ORDER — CLONIDINE HYDROCHLORIDE 0.1 MG/1
0.2 TABLET ORAL
Status: COMPLETED | OUTPATIENT
Start: 2018-01-24 | End: 2018-01-24

## 2018-01-24 RX ORDER — IBUPROFEN 200 MG
16 TABLET ORAL
Status: DISCONTINUED | OUTPATIENT
Start: 2018-01-24 | End: 2018-01-26 | Stop reason: HOSPADM

## 2018-01-24 RX ORDER — HEPARIN SODIUM 5000 [USP'U]/ML
5000 INJECTION, SOLUTION INTRAVENOUS; SUBCUTANEOUS EVERY 8 HOURS
Status: DISCONTINUED | OUTPATIENT
Start: 2018-01-24 | End: 2018-01-26 | Stop reason: HOSPADM

## 2018-01-24 RX ORDER — CEPHALEXIN 500 MG/1
500 CAPSULE ORAL EVERY 12 HOURS
Status: DISCONTINUED | OUTPATIENT
Start: 2018-01-24 | End: 2018-01-26 | Stop reason: HOSPADM

## 2018-01-24 RX ORDER — LABETALOL 200 MG/1
600 TABLET, FILM COATED ORAL EVERY 8 HOURS
Status: DISCONTINUED | OUTPATIENT
Start: 2018-01-24 | End: 2018-01-25

## 2018-01-24 RX ORDER — SODIUM CHLORIDE 0.9 % (FLUSH) 0.9 %
5 SYRINGE (ML) INJECTION
Status: DISCONTINUED | OUTPATIENT
Start: 2018-01-24 | End: 2018-01-26 | Stop reason: HOSPADM

## 2018-01-24 RX ORDER — OXYCODONE HYDROCHLORIDE 5 MG/1
5 TABLET ORAL EVERY 4 HOURS PRN
Status: DISCONTINUED | OUTPATIENT
Start: 2018-01-24 | End: 2018-01-26 | Stop reason: HOSPADM

## 2018-01-24 RX ORDER — HYDRALAZINE HYDROCHLORIDE 25 MG/1
25 TABLET, FILM COATED ORAL EVERY 12 HOURS
Status: DISCONTINUED | OUTPATIENT
Start: 2018-01-24 | End: 2018-01-26 | Stop reason: HOSPADM

## 2018-01-24 RX ORDER — METRONIDAZOLE 500 MG/1
500 TABLET ORAL EVERY 12 HOURS
Status: DISCONTINUED | OUTPATIENT
Start: 2018-01-24 | End: 2018-01-26 | Stop reason: HOSPADM

## 2018-01-24 RX ORDER — ONDANSETRON 4 MG/1
4 TABLET, FILM COATED ORAL EVERY 6 HOURS
Status: DISCONTINUED | OUTPATIENT
Start: 2018-01-24 | End: 2018-01-26 | Stop reason: HOSPADM

## 2018-01-24 RX ORDER — IBUPROFEN 200 MG
24 TABLET ORAL
Status: DISCONTINUED | OUTPATIENT
Start: 2018-01-24 | End: 2018-01-26 | Stop reason: HOSPADM

## 2018-01-24 RX ORDER — SODIUM CHLORIDE 9 MG/ML
INJECTION, SOLUTION INTRAVENOUS ONCE
Status: DISCONTINUED | OUTPATIENT
Start: 2018-01-24 | End: 2018-01-26 | Stop reason: HOSPADM

## 2018-01-24 RX ADMIN — TACROLIMUS 6 MG: 5 CAPSULE ORAL at 06:01

## 2018-01-24 RX ADMIN — CLONIDINE HYDROCHLORIDE 0.2 MG: 0.1 TABLET ORAL at 12:01

## 2018-01-24 RX ADMIN — NIFEDIPINE 10 MG: 10 CAPSULE, LIQUID FILLED ORAL at 02:01

## 2018-01-24 RX ADMIN — OXYCODONE HYDROCHLORIDE 5 MG: 5 TABLET ORAL at 02:01

## 2018-01-24 RX ADMIN — SODIUM POLYSTYRENE SULFONATE 15 G: 15 SUSPENSION ORAL; RECTAL at 02:01

## 2018-01-24 RX ADMIN — ONDANSETRON 4 MG: 4 TABLET, FILM COATED ORAL at 06:01

## 2018-01-24 NOTE — ED NOTES
[facility] room sent down an OBS box for patient, [facility] room made aware that pt actually going to 953, instructed to place box on patient and would switch boxes once pt got to floor. Tele box placed on pt, [facility] room notified.

## 2018-01-24 NOTE — ED NOTES
Patient identifiers have been checked and are correct.  Patient assisted to ED stretcher and placed in a hospital gown.     LOC: The patient is awake, alert, and aware of environment. The patient is oriented x 3 and speaking appropriately.   APPEARANCE: No acute distress noted.   PSYCHOSOCIAL: Patient is calm and cooperative.   SKIN: The skin is warm, dry.  RESPIRATORY: Airway is open and patent. Bilateral chest rise and fall. Respirations are spontaneous, even and unlabored. Normal effort and rate noted. No accessory muscle use noted.   CARDIAC: Patient has a normal rate and rhythm.   ABDOMEN: Distention noted.   URINARY: Voids independently.   EXTREMITIES: Dialysis access to left forearm, + thrill noted.   NEUROLOGIC: Eyes open spontaneously. Speech clear. Tolerating saliva secretions well. Able to follow commands. Moving all extremities. Movement is purposeful.  MUSCULOSKELETAL: No obvious deformities noted.     Limb alert placed to left arm.     Side rails up x2. Call light within reach. Father at bedside. Will continue to monitor.

## 2018-01-24 NOTE — ED NOTES
Dialysis consent signed and at bedside. MD wants pt to have private room, needs to have dialysis once in room. Reji (house sup) made aware. Pt also updated on plan-verbalizes understanding.

## 2018-01-24 NOTE — CONSULTS
Nephrology Consult        Consult Requested By: Jarad Diaz MD  Reason for Consult: ESRD    SUBJECTIVE:     History of Present Illness:  Patient is a 27 y.o. female  with a PMHx relevant for a OHLTx  ESRD on nxstage 4x week via PAL-AVF, renovascular HTN, splenomegaly, MRSA bacteremia, chronic rejection of liver transplant, immunosuppressed, CKD-MBD, and thrombocytoenia who presents with uncontrolled HTN. She was supposed to get a paracentesis but found to be hypertensive. She dialyses under the care of Dr. Bass in [facility] dialysis therapies. She has residual renal funcion and produces about 4 cups of urine daily according to her.  She denies any dysuria or any urinary symptoms. Nephrology consulted for ESRD co management. Last treatment was on yesterday. EDW around 54-56kg.         (Not in a hospital admission)    Review of patient's allergies indicates:   Allergen Reactions    Chloral hydrate      Other reaction(s): Hallucinations  Other reaction(s): Hives    Hydrocodone Other (See Comments)     Mental status changes        Past Medical History:   Diagnosis Date    Anemia in ESRD (end-stage renal disease) 10/12/2015    Chronic rejection of liver transplant 3/22/2016    ESRD on hemodialysis 2015    History of splenomegaly 2016    Immunosuppressed 2017    Iron deficiency anemia secondary to inadequate dietary iron intake 2017    She receives IV iron periodically at the [facility].   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 ()    Moderate protein-calorie malnutrition 2017    MRSA bacteremia 2017    Prophylactic immunotherapy 2014    Renovascular hypertension 10/2/2015    Secondary hyperparathyroidism 2017    Thrombocytopenia 2016     Past Surgical History:   Procedure Laterality Date     SECTION      2     KIDNEY TRANSPLANT      LIVER BIOPSY      LIVER TRANSPLANT  1992    TUBAL LIGATION        Family History   Problem Relation Age of Onset    Hypertension Mother     Hypertension Father     Melanoma Neg Hx      Social History   Substance Use Topics    Smoking status: Never Smoker    Smokeless tobacco: Never Used    Alcohol use No       Review of Systems:  Constitutional: Negative for fatigue.   Eyes: Negative for discharge.   Respiratory: Negative for cough, shortness of breath and wheezing.   Cardiovascular: Negative for chest pain and palpitations.   Gastrointestinal: Negative for nausea, vomiting, abdominal pain and diarrhea.   Genitourinary: Negative for dysuria, urgency, frequency and hematuria.   Skin: Negative for color change and rash.   Psychiatric/Behavioral: Negative for confusion.      OBJECTIVE:     Vital Signs (Most Recent)  Temp: 97.9 °F (36.6 °C) (01/24/18 1130)  Pulse: 93 (01/24/18 1601)  Resp: 20 (01/24/18 1601)  BP: (!) 162/98 (01/24/18 1601)  SpO2: 95 % (01/24/18 1601)       No intake or output data in the 24 hours ending 01/24/18 1610    Physical Exam:  Gen: AAOx3, NAD  HEENT: mmm  Neck: no bruit, no JVD  CV: RRR, no m/r  Resp: CTAx2, normal effort  GI: soft, ND, NTTP, +BS  Extr: no LE edema  Neuro: normal reflexes, no focal deficits  Access: left RC AVF+ thrill, + bruit, + button holes    Laboratory:  CBC with Diff:     Recent Labs  Lab 01/18/18  1618 01/22/18  0919 01/24/18  1208   WBC 3.08* 3.31* 3.95   HGB 9.1* 8.7* 9.5*   HCT 28.5* 27.2* 29.8*   PLT 60* 57* 69*   LYMPH 22.7  0.7* 15.7*  0.5* 15.4*  0.6*   MONO 4.2  0.1* 4.8  0.2* 6.3  0.3   EOSINOPHIL 7.8 8.2* 7.8     COAG:    Recent Labs  Lab 01/18/18  1618   INR 1.1       CMP:   Recent Labs  Lab 01/18/18  1618 01/22/18  0919 01/24/18  1208   * 147* 97   CALCIUM 8.3* 8.9 8.9   ALBUMIN 2.7* 2.4* 2.6*   PROT 7.5 6.5 7.3    139 138   K 4.9 4.0 5.6*   CO2 SEE COMMENT 25 22*    100 101   BUN 84* 59* 70*   CREATININE 17.7* 14.4* 17.3*   ALKPHOS 635* 533* 598*   ALT 39 26 25   AST 36 32 31   BILITOT  0.5 0.4 0.5   MG 2.5  --   --    PHOS 7.6*  --   --      Estimated Creatinine Clearance: 4.4 mL/min (based on SCr of 17.3 mg/dL (H)).  Corrected Calcium:      Lab Results   Component Value Date    LABPROT 11.2 01/18/2018     Lab Results   Component Value Date    CALCIUM 8.9 01/24/2018    PHOS 7.6 (H) 01/18/2018     Lab Results   Component Value Date    IRON 108 01/04/2018    TIBC 221 (L) 01/04/2018    FERRITIN 903 (H) 01/04/2018         Diagnostic Results:  Labs: Reviewed      Scheduled Meds:   cephALEXin  500 mg Oral Q12H    [START ON 1/25/2018] cinacalcet  90 mg Oral Daily with breakfast    [START ON 1/26/2018] cloNIDine 0.2 mg/24 hr td ptwk  1 patch Transdermal Every Fri    dicyclomine  20 mg Oral BID    famotidine  20 mg Oral Daily    heparin (porcine)  5,000 Units Subcutaneous Q8H    hydrALAZINE  25 mg Oral Q12H    labetalol  600 mg Oral Q8H    metroNIDAZOLE  500 mg Oral Q12H    [START ON 1/25/2018] NIFEdipine  30 mg Oral Daily    ondansetron  4 mg Oral Q6H    [START ON 1/25/2018] predniSONE  1 mg Oral Every other day    tacrolimus  6 mg Oral BID         ASSESSMENT/PLAN:     Patient Active Problem List   Diagnosis   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    Prophylactic immunotherapy    ESRD on hemodialysis    Renovascular hypertension    Anemia in ESRD (end-stage renal disease)    Chronic rejection of liver transplant    Thrombocytopenia    Immunosuppressed    Secondary hyperparathyroidism    Iron deficiency anemia secondary to inadequate dietary iron intake    Moderate protein-calorie malnutrition    Urinary tract infection without hematuria    Uremia    Uncontrolled hypertension    Hyperkalemia       Plan:     1. ESRD   Will do HD for clearance and volume management tonight in room  Ultrafiltration goal 2-3L.   Dialysate bath has been adjusted according to the current labs.     2. Anemia- H/H stable, continue EPO as outpatient     3. MBD- Continue sensipar daily. Check phos level in AM.      4. HTN- resume home dose meds and adjust as needed.

## 2018-01-24 NOTE — PROGRESS NOTES
During ultrasound evaluation, no ascites identified for a safe paracentesis. No paracentesis performed. Patient's blood pressure 200/130s. Explained this is too high. Discussed risk of heart attack, stroke, and death with blood pressure this high. Patient and father verbalized understanding. ER called and notified.

## 2018-01-24 NOTE — HOSPITAL COURSE
Ms. Patel presented to the ED with BP in 200/120s from IR where she was scheduled to get outpatient paracentesis. K found to be 5.6. Nephrology consulted for dialysis and tacro level ordered. She was started on increased doses of home labetalol to 800mg tid and nifedipine 60 daily, also placed on hydralazine 25 bid and lasix 100mg bid; continuing home clonidine 0.2 patch. Blood pressure well controlled. She was also >5kg over dry weight and had excess fluid removed during dialysis. Tacrolimus level elevated while in house to 13, likely due to poor patient compliance as outpatient. Held pm dose of tacro and will resume at 5mg bid tomorrow with close follow up.    Vitals:    01/26/18 1100 01/26/18 1115 01/26/18 1130 01/26/18 1220   BP: (!) 140/84 128/73 (!) 156/94 (!) 147/84   BP Location: Right arm Right arm Right arm    Patient Position: Lying Lying Lying Lying   Pulse: 78 81 79 87   Resp: 18 18 18 18   Temp:   98.6 °F (37 °C) 98 °F (36.7 °C)   TempSrc:   Oral Oral   SpO2:   100% 98%   Weight:       Height:           Physical Exam   Constitutional: She is oriented to person, place, and time. She appears well-developed and well-nourished. No distress.   HENT:   Head: Normocephalic and atraumatic.   Nose: Nose normal.   Eyes: EOM are normal. Pupils are equal, round, and reactive to light. No scleral icterus.   Neck: Normal range of motion. No JVD present. No tracheal deviation present.   Cardiovascular: Normal rate, regular rhythm and normal heart sounds.  Exam reveals no gallop and no friction rub.    No murmur heard.  Pulmonary/Chest: Effort normal. No respiratory distress.   Decreased breath sounds at bilateral bases   Abdominal: Soft. She exhibits no distension and no mass. There is no tenderness. No hernia.   Musculoskeletal: She exhibits no edema or deformity.   Left AVF with good thrill   Neurological: She is alert and oriented to person, place, and time.   Skin: Skin is warm and dry. No rash noted. She is not  diaphoretic.   Psychiatric: She has a normal mood and affect. Her behavior is normal.

## 2018-01-24 NOTE — HPI
Ms. Patel is a 26yo woman with hx of liver transplant in '92 complicated by ESRD on home HD who presents from outpatient IR suite with complaint of asymptomatic hypertension. Of note, she has been having increased abdominal distension with ascites over the past few weeks. She is following closely with Hepatology, who are concerned that this may represent rejection and were planning on diagnostic/therapeutic paracentesis. She states she has been compliant with all medications, including her tacrolimus, although she does not know the doses of any of her medications and insists I look them up in the computer. She is otherwise asymptomatic; she denies headache, vision changes, chest pain, nausea, vomiting, abdominal pain, leg swelling, palpitations. Last episode of dialysis was yesterday. She has not changed her HD regimen recently. She does endorse some progressive shortness of breath which has worsened over past few weeks with her abdominal swelling. Of note, at last visit to Hepatology clinic, Bps were in 180s/100 and she was asymptomatic at that time.

## 2018-01-24 NOTE — ASSESSMENT & PLAN NOTE
- appears to be somewhat of a chronic issue, as last BP in clinic a week ago was roughly in the same range  - potentially side effect of tacrolimus vs progressive fluid accumulation with inadequate HD as outpatient vs medication non-compliance  - consult Nephrology for HD  - continue home labetalol 600 tid, hydralazine 25 bid, clonidine patch 0.2/wk, and nifedipine 30  - will check tacro level

## 2018-01-24 NOTE — SUBJECTIVE & OBJECTIVE
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 ()    Moderate protein-calorie malnutrition 2017    MRSA bacteremia 2017    Prophylactic immunotherapy 2014    Renovascular hypertension 10/2/2015    Secondary hyperparathyroidism 2017    Thrombocytopenia 2016       Past Surgical History:   Procedure Laterality Date     SECTION      2     KIDNEY TRANSPLANT      LIVER BIOPSY      LIVER TRANSPLANT  1992    TUBAL LIGATION         Review of patient's allergies indicates:   Allergen Reactions    Chloral hydrate      Other reaction(s): Hallucinations  Other reaction(s): Hives    Hydrocodone Other (See Comments)     Mental status changes       No current facility-administered medications on file prior to encounter.      Current Outpatient Prescriptions on File Prior to Encounter   Medication Sig    metroNIDAZOLE (FLAGYL) 500 MG tablet Take 1 tablet (500 mg total) by mouth every 12 (twelve) hours.    cephALEXin (KEFLEX) 500 MG capsule Take 1 capsule (500 mg total) by mouth every 12 (twelve) hours.    cinacalcet (SENSIPAR) 90 MG Tab Take 1 tablet (90 mg total) by mouth daily with breakfast.    cloNIDine 0.2 mg/24 hr td ptwk (CATAPRES) 0.2 mg/24 hr Place 1 patch onto the skin every 7 days. Change every Friday    dicyclomine (BENTYL) 20 mg tablet Take 1 tablet (20 mg total) by mouth 2 (two) times daily.    famotidine (PEPCID) 20 MG tablet Take 1 tablet (20 mg total) by mouth 2 (two) times daily.    food supplemt, lactose-reduced (ENSURE ACTIVE HIGH PROTEIN) Liqd  Take 236 mLs by mouth 2 (two) times daily.    hydrALAZINE (APRESOLINE) 25 MG tablet Take 1 tablet (25 mg total) by mouth every 12 (twelve) hours.    labetalol (NORMODYNE) 300 MG tablet Take 2 tablets (600 mg total) by mouth every 8 (eight) hours.    NIFEdipine (PROCARDIA-XL) 30 MG (OSM) 24 hr tablet Take 1 tablet (30 mg total) by mouth once daily.    ondansetron (ZOFRAN) 4 MG tablet Take 1 tablet (4 mg total) by mouth every 6 (six) hours.    oxyCODONE (ROXICODONE) 5 MG immediate release tablet Take 1 tablet (5 mg total) by mouth every 4 (four) hours as needed for Pain.    predniSONE (DELTASONE) 1 MG tablet Take 1 mg by mouth every other day.    tacrolimus (PROGRAF) 1 MG Cap Take 6 capsules (6 mg total) by mouth every 12 (twelve) hours.    triamcinolone acetonide 0.1% (KENALOG) 0.1 % ointment AAA on arms, legs, and neck bid x 1-2 wks then prn flares only (Patient taking differently: Apply to affected area(s) on arms, legs, and neck twice daily x 1-2 wks then as needed for flares only)     Family History     Problem Relation (Age of Onset)    Hypertension Mother, Father        Social History Main Topics    Smoking status: Never Smoker    Smokeless tobacco: Never Used    Alcohol use No    Drug use: No    Sexual activity: Yes     Partners: Male     Review of Systems   Constitutional: Negative for chills, diaphoresis and fever.   HENT: Negative for congestion and sore throat.    Eyes: Negative for discharge and visual disturbance.   Respiratory: Positive for shortness of breath. Negative for cough.    Cardiovascular: Negative for chest pain and leg swelling.   Gastrointestinal: Positive for abdominal distention, nausea and vomiting. Negative for abdominal pain.   Genitourinary: Positive for dysuria. Negative for difficulty urinating.   Musculoskeletal: Negative for arthralgias and joint swelling.   Skin: Negative for rash and wound.   Allergic/Immunologic: Negative for immunocompromised state.    Neurological: Negative for light-headedness and headaches.   Psychiatric/Behavioral: Negative for agitation and confusion.     Objective:     Vital Signs (Most Recent):  Temp: 97.9 °F (36.6 °C) (01/24/18 1130)  Pulse: 85 (01/24/18 1431)  Resp: 18 (01/24/18 1130)  BP: (!) 189/124 (01/24/18 1431)  SpO2: 97 % (01/24/18 1431) Vital Signs (24h Range):  Temp:  [97.9 °F (36.6 °C)] 97.9 °F (36.6 °C)  Pulse:  [84-89] 85  Resp:  [16-18] 18  SpO2:  [94 %-98 %] 97 %  BP: (189-207)/(124-134) 189/124     Weight: 61.2 kg (135 lb)  Body mass index is 22.47 kg/m².    Physical Exam   Constitutional: She is oriented to person, place, and time. She appears well-developed and well-nourished. No distress.   HENT:   Head: Normocephalic and atraumatic.   Nose: Nose normal.   Eyes: EOM are normal. Pupils are equal, round, and reactive to light. No scleral icterus.   Neck: Normal range of motion. No JVD present. No tracheal deviation present.   Cardiovascular: Normal rate, regular rhythm and normal heart sounds.  Exam reveals no gallop and no friction rub.    No murmur heard.  Pulmonary/Chest: Effort normal. No respiratory distress.   Decreased breath sounds at bilateral bases   Abdominal: Soft. She exhibits no distension and no mass. There is no tenderness. No hernia.   Musculoskeletal: She exhibits no edema or deformity.   Left AVF with good thrill   Neurological: She is alert and oriented to person, place, and time.   Skin: Skin is warm and dry. No rash noted. She is not diaphoretic.   Psychiatric: She has a normal mood and affect. Her behavior is normal.   Vitals reviewed.        CRANIAL NERVES     CN III, IV, VI   Pupils are equal, round, and reactive to light.  Extraocular motions are normal.        Significant Labs:   Recent Lab Results       01/24/18  1208      Immature Granulocytes 0.8(H)     Immature Grans (Abs) 0.03     Albumin 2.6(L)     Alkaline Phosphatase 598(H)     ALT 25     Anion Gap 15     AST 31     Baso # 0.01      Basophil% 0.3     Total Bilirubin 0.5  Comment:  For infants and newborns, interpretation of results should be based  on gestational age, weight and in agreement with clinical  observations.  Premature Infant recommended reference ranges:  Up to 24 hours.............<8.0 mg/dL  Up to 48 hours............<12.0 mg/dL  3-5 days..................<15.0 mg/dL  6-29 days.................<15.0 mg/dL       BUN, Bld 70(H)     Calcium 8.9     Chloride 101     CO2 22(L)     Creatinine 17.3(H)     Differential Method Automated     eGFR if African American 2.8(A)     eGFR if non  2.5  Comment:  Calculation used to obtain the estimated glomerular filtration  rate (eGFR) is the CKD-EPI equation.   (A)     Eos # 0.3     Eosinophil% 7.8     Estimated Avg Glucose 82     Glucose 97     Gran # 2.7     Gran% 69.4     Hematocrit 29.8(L)     Hemoglobin 9.5(L)     Hemoglobin A1C 4.5  Comment:  According to ADA guidelines, hemoglobin A1c <7.0% represents  optimal control in non-pregnant diabetic patients. Different  metrics may apply to specific patient populations.   Standards of Medical Care in Diabetes-2016.  For the purpose of screening for the presence of diabetes:  <5.7%     Consistent with the absence of diabetes  5.7-6.4%  Consistent with increasing risk for diabetes   (prediabetes)  >or=6.5%  Consistent with diabetes  Currently, no consensus exists for use of hemoglobin A1c  for diagnosis of diabetes for children.  This Hemoglobin A1c assay has significant interference with fetal   hemoglobin   (HbF). The results are invalid for patients with abnormal amounts of   HbF,   including those with known Hereditary Persistence   of Fetal Hemoglobin. Heterozygous hemoglobin variants (HbAS, HbAC,   HbAD, HbAE, HbA2) do not significantly interfere with this assay;   however, presence of multiple variants in a sample may impact the %   interference.       Lymph # 0.6(L)     Lymph% 15.4(L)     MCH 25.4(L)     MCHC 31.9(L)      MCV 80(L)     Mono # 0.3     Mono% 6.3     MPV SEE COMMENT  Comment:  Result not available.     nRBC 0     Platelets 69(L)     Potassium 5.6  Comment:  *No Visible Hemolysis(H)     Total Protein 7.3     RBC 3.74(L)     RDW 16.9(H)     Sodium 138     Troponin I 0.038  Comment:  The reference interval for Troponin I represents the 99th percentile   cutoff   for our facility and is consistent with 3rd generation assay   performance.  (H)     WBC 3.95         All pertinent labs within the past 24 hours have been reviewed.    Significant Imaging: I have reviewed all pertinent imaging results/findings within the past 24 hours.

## 2018-01-24 NOTE — ED TRIAGE NOTES
Sent from outpatient procedure area, was scheduled to have paracentesis today, but unable to perform due to elevated BP. Hx of liver transplant at 1 years old. Dialysis pt, last treatment yesterday.

## 2018-01-24 NOTE — ED NOTES
Pt resting comfortably in bed. No distress noted. Respirations even and unlabored. Denies any current complaints. Reports pain 1/10 currently. Made aware of room assignment. Side rails up x2. Family at bedside. Will continue to monitor.

## 2018-01-24 NOTE — ED NOTES
Pt transported to floor with escort, awake and alert. No distress noted. In stable condition. Respirations even and unlabored. All belongings and mother with pt.

## 2018-01-24 NOTE — ED PROVIDER NOTES
Encounter Date: 1/24/2018    SCRIBE #1 NOTE: I, Jax Singleton, am scribing for, and in the presence of,  Dr. Diaz. I have scribed the entire note.       History     Chief Complaint   Patient presents with    Hypertension     was upstairs and supposed to get a paracentesis. sent to er for hypertension. liver txp in 1992     Time patient was seen by the provider: 11:53 AM      The patient is a 27 y.o. female with co-morbidities including: ESRD on hemodialysis with a hx of: liver transplant(1992) and kidney transplant who presents to the ED with a complaint of persistent abdominal distension. She was scheduled for paracentesis this morning, but was sent to the ER after being found to be hypertensive. Pt endorses compliance with her antihypertensive medications. She reports her blood pressure is frequently elevated despite medication compliance. Pt notes an associated abdominal pain, leg swelling, and intermittent shortness of breath. She was last dialyzed 1 day ago. No fever, chills, cough, nausea, vomiting, diarrhea, or headache. Pt denies any history of cardiac disease.       The history is provided by the patient and medical records.     Review of patient's allergies indicates:   Allergen Reactions    Chloral hydrate      Other reaction(s): Hallucinations  Other reaction(s): Hives    Hydrocodone Other (See Comments)     Mental status changes     Past Medical History:   Diagnosis Date    Anemia in ESRD (end-stage renal disease) 10/12/2015    Chronic rejection of liver transplant 3/22/2016    ESRD on hemodialysis 9/30/2015    History of splenomegaly 4/12/2016    Immunosuppressed 8/5/2017    Iron deficiency anemia secondary to inadequate dietary iron intake 8/16/2017    She receives IV iron periodically at the [facility].   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)    Moderate protein-calorie malnutrition 8/16/2017    MRSA bacteremia 8/6/2017    Prophylactic  immunotherapy 2014    Renovascular hypertension 10/2/2015    Secondary hyperparathyroidism 2017    Thrombocytopenia 2016     Past Surgical History:   Procedure Laterality Date     SECTION      2     KIDNEY TRANSPLANT      LIVER BIOPSY      LIVER TRANSPLANT  1992    TUBAL LIGATION       Family History   Problem Relation Age of Onset    Hypertension Mother     Hypertension Father     Melanoma Neg Hx      Social History   Substance Use Topics    Smoking status: Never Smoker    Smokeless tobacco: Never Used    Alcohol use No     Review of Systems   Constitutional: Negative for chills and fever.   HENT: Negative for sore throat.    Respiratory: Positive for shortness of breath.    Cardiovascular: Negative for chest pain.   Gastrointestinal: Positive for abdominal distention and abdominal pain. Negative for diarrhea, nausea and vomiting.   Genitourinary: Negative for dysuria.   Musculoskeletal: Negative for back pain.   Skin: Negative for rash.   Neurological: Negative for weakness and headaches.   Hematological: Does not bruise/bleed easily.       Physical Exam     Initial Vitals [18 1130]   BP Pulse Resp Temp SpO2   (!) 196/127 84 18 97.9 °F (36.6 °C) 98 %      MAP       150         Physical Exam    Nursing note and vitals reviewed.  Constitutional:   Ill appearing, chronically ill young black female. In mild distress    HENT:   Head: Normocephalic and atraumatic.   Eyes:   Pale conjunctiva    Neck: Neck supple.   JVD   Cardiovascular:   2/6 systolic ejection murmur    Pulmonary/Chest:   Scattered rales bilaterally    Abdominal: Soft. There is no tenderness. There is no rebound.   Slight distension and fluid wave present. No CVA tenderness present    Musculoskeletal:   Extremities full range of motion. 1+ edema to both lower extremities to the pretibial area    Neurological: She is alert and oriented to person, place, and time.   5/5 strength    Skin:   Scattered darkened  hyperpigmented lesions particularly to the lower extremities bilaterally    Psychiatric: She has a normal mood and affect.         ED Course   Procedures  Labs Reviewed   CBC W/ AUTO DIFFERENTIAL - Abnormal; Notable for the following:        Result Value    RBC 3.74 (*)     Hemoglobin 9.5 (*)     Hematocrit 29.8 (*)     MCV 80 (*)     MCH 25.4 (*)     MCHC 31.9 (*)     RDW 16.9 (*)     Platelets 69 (*)     Immature Granulocytes 0.8 (*)     Lymph # 0.6 (*)     Lymph% 15.4 (*)     All other components within normal limits   COMPREHENSIVE METABOLIC PANEL - Abnormal; Notable for the following:     Potassium 5.6 (*)     CO2 22 (*)     BUN, Bld 70 (*)     Creatinine 17.3 (*)     Albumin 2.6 (*)     Alkaline Phosphatase 598 (*)     eGFR if  2.8 (*)     eGFR if non  2.5 (*)     All other components within normal limits   TROPONIN I - Abnormal; Notable for the following:     Troponin I 0.038 (*)     All other components within normal limits   URINALYSIS, REFLEX TO URINE CULTURE     EKG Readings: (Independently Interpreted)   normal sinus rhythm, normal rate of 81, left atrial enlargement, slight left axis deviation, left ventricular hypertrophy, non specific ST depression, unchanged from last EKG       X-Rays:   Independently Interpreted Readings:   Chest X-Ray: Increased vascular markings consistent with CHF are present. Basilar pleural effusion with vessel prominence/ early chf     Medical Decision Making:   History:   Old Medical Records: I decided to obtain old medical records.  Initial Assessment:   27 y.o. Black female with ESRD status post liver transplant with recurrent rejection referred from GI. While attempting to undergo paracentesis it was noted that her blood pressure was out of control and she was referred here for evaluation.   Independently Interpreted Test(s):   I have ordered and independently interpreted X-rays - see prior notes.  I have ordered and independently  interpreted EKG Reading(s) - see prior notes  Clinical Tests:   Lab Tests: Ordered and Reviewed  Radiological Study: Ordered and Reviewed  Medical Tests: Ordered and Reviewed  ED Management:  Pt was treated with oral blood pressure medications clonidine and procardia with minimal improvement of her blood pressure. She is admitted for medication adjustment.     Pt was also noted to be hyperkalemic with potassium of 5.6 on labs despite being 1 day status post dialysis. She was treated with oral kayexalate and admitted to medicine for further evaluation and treatment             Scribe Attestation:   Scribe #1: I performed the above scribed service and the documentation accurately describes the services I performed. I attest to the accuracy of the note.            ED Course as of Jan 24 1417   Wed Jan 24, 2018   1231 BP: (!) 196/127 [GS]      ED Course User Index  [GS] Jarad Diaz MD     Clinical Impression:   The primary encounter diagnosis was Uncontrolled hypertension. Diagnoses of Chest pain, Hyperkalemia, Renovascular hypertension, Liver replaced by transplant, Anemia in ESRD (end-stage renal disease), and Prophylactic immunotherapy were also pertinent to this visit.  Uncontrolled hypertension 2. ESRD 3. Hx of Liver Transplant-hx of Chronic rejection  Disposition:   Disposition: Admitted                        Jarad Diaz MD  01/24/18 5446       Jarad Diaz MD  01/24/18 8323

## 2018-01-24 NOTE — PROGRESS NOTES
Pt brought to ED per DAVID Estrella NP via wheelchair by EDMOND Dejesus.  Family notified and accompanied pt to ED with RN.

## 2018-01-25 LAB
ANION GAP SERPL CALC-SCNC: 11 MMOL/L
BASOPHILS # BLD AUTO: 0.01 K/UL
BASOPHILS NFR BLD: 0.2 %
BUN SERPL-MCNC: 30 MG/DL
CALCIUM SERPL-MCNC: 8.7 MG/DL
CHLORIDE SERPL-SCNC: 102 MMOL/L
CO2 SERPL-SCNC: 23 MMOL/L
CREAT SERPL-MCNC: 9.5 MG/DL
DIFFERENTIAL METHOD: ABNORMAL
EOSINOPHIL # BLD AUTO: 0.2 K/UL
EOSINOPHIL NFR BLD: 5.4 %
ERYTHROCYTE [DISTWIDTH] IN BLOOD BY AUTOMATED COUNT: 16.9 %
EST. GFR  (AFRICAN AMERICAN): 5.9 ML/MIN/1.73 M^2
EST. GFR  (NON AFRICAN AMERICAN): 5.1 ML/MIN/1.73 M^2
GLUCOSE SERPL-MCNC: 99 MG/DL
HCT VFR BLD AUTO: 27.3 %
HGB BLD-MCNC: 8.6 G/DL
IMM GRANULOCYTES # BLD AUTO: 0.02 K/UL
IMM GRANULOCYTES NFR BLD AUTO: 0.5 %
LYMPHOCYTES # BLD AUTO: 0.6 K/UL
LYMPHOCYTES NFR BLD: 13.9 %
MAGNESIUM SERPL-MCNC: 1.8 MG/DL
MCH RBC QN AUTO: 24.6 PG
MCHC RBC AUTO-ENTMCNC: 31.5 G/DL
MCV RBC AUTO: 78 FL
MONOCYTES # BLD AUTO: 0.3 K/UL
MONOCYTES NFR BLD: 7.6 %
NEUTROPHILS # BLD AUTO: 3 K/UL
NEUTROPHILS NFR BLD: 72.4 %
NRBC BLD-RTO: 0 /100 WBC
PHOSPHATE SERPL-MCNC: 5.9 MG/DL
PLATELET # BLD AUTO: 66 K/UL
PMV BLD AUTO: ABNORMAL FL
POTASSIUM SERPL-SCNC: 4.4 MMOL/L
RBC # BLD AUTO: 3.49 M/UL
SODIUM SERPL-SCNC: 136 MMOL/L
TACROLIMUS BLD-MCNC: 6.3 NG/ML
TACROLIMUS BLD-MCNC: 7.8 NG/ML
WBC # BLD AUTO: 4.1 K/UL

## 2018-01-25 PROCEDURE — 25000003 PHARM REV CODE 250: Performed by: NURSE PRACTITIONER

## 2018-01-25 PROCEDURE — 36415 COLL VENOUS BLD VENIPUNCTURE: CPT

## 2018-01-25 PROCEDURE — 80048 BASIC METABOLIC PNL TOTAL CA: CPT

## 2018-01-25 PROCEDURE — 83735 ASSAY OF MAGNESIUM: CPT

## 2018-01-25 PROCEDURE — 25000003 PHARM REV CODE 250: Performed by: HOSPITALIST

## 2018-01-25 PROCEDURE — 85025 COMPLETE CBC W/AUTO DIFF WBC: CPT

## 2018-01-25 PROCEDURE — 99213 OFFICE O/P EST LOW 20 MIN: CPT | Mod: ,,, | Performed by: NURSE PRACTITIONER

## 2018-01-25 PROCEDURE — G0378 HOSPITAL OBSERVATION PER HR: HCPCS

## 2018-01-25 PROCEDURE — 84100 ASSAY OF PHOSPHORUS: CPT

## 2018-01-25 PROCEDURE — 63600175 PHARM REV CODE 636 W HCPCS: Performed by: HOSPITALIST

## 2018-01-25 PROCEDURE — 99225 PR SUBSEQUENT OBSERVATION CARE,LEVEL II: CPT | Mod: ,,, | Performed by: HOSPITALIST

## 2018-01-25 PROCEDURE — 80197 ASSAY OF TACROLIMUS: CPT

## 2018-01-25 RX ORDER — SODIUM CHLORIDE 9 MG/ML
INJECTION, SOLUTION INTRAVENOUS
Status: DISCONTINUED | OUTPATIENT
Start: 2018-01-25 | End: 2018-01-26 | Stop reason: HOSPADM

## 2018-01-25 RX ORDER — SODIUM CHLORIDE 9 MG/ML
INJECTION, SOLUTION INTRAVENOUS ONCE
Status: COMPLETED | OUTPATIENT
Start: 2018-01-25 | End: 2018-01-26

## 2018-01-25 RX ORDER — HYDRALAZINE HYDROCHLORIDE 25 MG/1
25 TABLET, FILM COATED ORAL ONCE
Status: COMPLETED | OUTPATIENT
Start: 2018-01-25 | End: 2018-01-25

## 2018-01-25 RX ORDER — NIFEDIPINE 60 MG/1
60 TABLET, EXTENDED RELEASE ORAL DAILY
Status: DISCONTINUED | OUTPATIENT
Start: 2018-01-25 | End: 2018-01-26 | Stop reason: HOSPADM

## 2018-01-25 RX ORDER — LABETALOL 200 MG/1
800 TABLET, FILM COATED ORAL EVERY 8 HOURS
Status: DISCONTINUED | OUTPATIENT
Start: 2018-01-25 | End: 2018-01-26 | Stop reason: HOSPADM

## 2018-01-25 RX ADMIN — NIFEDIPINE 60 MG: 60 TABLET, FILM COATED, EXTENDED RELEASE ORAL at 08:01

## 2018-01-25 RX ADMIN — METRONIDAZOLE 500 MG: 500 TABLET ORAL at 09:01

## 2018-01-25 RX ADMIN — DICYCLOMINE HYDROCHLORIDE 20 MG: 20 TABLET ORAL at 09:01

## 2018-01-25 RX ADMIN — ONDANSETRON 4 MG: 4 TABLET, FILM COATED ORAL at 06:01

## 2018-01-25 RX ADMIN — FAMOTIDINE 20 MG: 20 TABLET, FILM COATED ORAL at 12:01

## 2018-01-25 RX ADMIN — HEPARIN SODIUM 5000 UNITS: 5000 INJECTION, SOLUTION INTRAVENOUS; SUBCUTANEOUS at 09:01

## 2018-01-25 RX ADMIN — LABETALOL HYDROCHLORIDE 600 MG: 200 TABLET, FILM COATED ORAL at 12:01

## 2018-01-25 RX ADMIN — ONDANSETRON 4 MG: 4 TABLET, FILM COATED ORAL at 12:01

## 2018-01-25 RX ADMIN — DICYCLOMINE HYDROCHLORIDE 20 MG: 20 TABLET ORAL at 08:01

## 2018-01-25 RX ADMIN — HYDRALAZINE HYDROCHLORIDE 25 MG: 25 TABLET, FILM COATED ORAL at 09:01

## 2018-01-25 RX ADMIN — HYDRALAZINE HYDROCHLORIDE 25 MG: 25 TABLET, FILM COATED ORAL at 12:01

## 2018-01-25 RX ADMIN — PREDNISONE 1 MG: 1 TABLET ORAL at 08:01

## 2018-01-25 RX ADMIN — CEPHALEXIN 500 MG: 500 CAPSULE ORAL at 08:01

## 2018-01-25 RX ADMIN — LABETALOL HYDROCHLORIDE 600 MG: 200 TABLET, FILM COATED ORAL at 06:01

## 2018-01-25 RX ADMIN — LABETALOL HYDROCHLORIDE 800 MG: 200 TABLET, FILM COATED ORAL at 09:01

## 2018-01-25 RX ADMIN — DICYCLOMINE HYDROCHLORIDE 20 MG: 20 TABLET ORAL at 12:01

## 2018-01-25 RX ADMIN — METRONIDAZOLE 500 MG: 500 TABLET ORAL at 08:01

## 2018-01-25 RX ADMIN — HEPARIN SODIUM 5000 UNITS: 5000 INJECTION, SOLUTION INTRAVENOUS; SUBCUTANEOUS at 12:01

## 2018-01-25 RX ADMIN — TACROLIMUS 7 MG: 5 CAPSULE ORAL at 05:01

## 2018-01-25 RX ADMIN — HYDRALAZINE HYDROCHLORIDE 25 MG: 25 TABLET, FILM COATED ORAL at 03:01

## 2018-01-25 RX ADMIN — TACROLIMUS 7 MG: 5 CAPSULE ORAL at 08:01

## 2018-01-25 RX ADMIN — OXYCODONE HYDROCHLORIDE 5 MG: 5 TABLET ORAL at 12:01

## 2018-01-25 RX ADMIN — FUROSEMIDE 100 MG: 40 TABLET ORAL at 05:01

## 2018-01-25 RX ADMIN — CEPHALEXIN 500 MG: 500 CAPSULE ORAL at 09:01

## 2018-01-25 RX ADMIN — HEPARIN SODIUM 5000 UNITS: 5000 INJECTION, SOLUTION INTRAVENOUS; SUBCUTANEOUS at 02:01

## 2018-01-25 RX ADMIN — LABETALOL HYDROCHLORIDE 800 MG: 200 TABLET, FILM COATED ORAL at 02:01

## 2018-01-25 RX ADMIN — CEPHALEXIN 500 MG: 500 CAPSULE ORAL at 12:01

## 2018-01-25 RX ADMIN — CINACALCET HYDROCHLORIDE 90 MG: 60 TABLET, COATED ORAL at 08:01

## 2018-01-25 RX ADMIN — HYDRALAZINE HYDROCHLORIDE 25 MG: 25 TABLET, FILM COATED ORAL at 08:01

## 2018-01-25 RX ADMIN — METRONIDAZOLE 500 MG: 500 TABLET ORAL at 12:01

## 2018-01-25 RX ADMIN — FAMOTIDINE 20 MG: 20 TABLET, FILM COATED ORAL at 09:01

## 2018-01-25 RX ADMIN — HEPARIN SODIUM 5000 UNITS: 5000 INJECTION, SOLUTION INTRAVENOUS; SUBCUTANEOUS at 06:01

## 2018-01-25 RX ADMIN — ONDANSETRON 4 MG: 4 TABLET, FILM COATED ORAL at 05:01

## 2018-01-25 NOTE — ASSESSMENT & PLAN NOTE
ESRD x 2 years on Home Dialysis Sun, Mon, Wed, Fri  under care of Dr. Bass [place] [facility]. EDW 59 kg. Usually net UF 2 L per tx but have missed a few treatments. Patient does not know home medications. Reports making good urine even up to 1 L a day?  -Lytes and acid/base stable at this time. Tolerating 2.5 L.   -Spoke with Home hemo RN [place]> Home clonidine 0.2 patch, nifedipime 30 mg, labetaol 300 mg TID  -Strict IO. Add lasix 100 mg PO BID. Titrate antihypertensives above.   -Plan for HD tomorrow and UF goal to EDW.     BMD  Lab Results   Component Value Date    CALCIUM 8.7 01/25/2018    PHOS 5.9 (H) 01/25/2018   - continue home calcitriol 1 mcg bid, sensipar 90 mg, renvela pwdr 2.4g TID

## 2018-01-25 NOTE — PROGRESS NOTES
Maintenance dialysis initiated to LUE fistula via buttonhole technique. Pt verbalized she only ran with 350 BFR at her clinic. UF set to 2.5L. Alarms set and all lines secured. Will continue to monitor for 3hrs.

## 2018-01-25 NOTE — SUBJECTIVE & OBJECTIVE
Interval History: Doing well; denies SOB, Cp, Ha. Received HD last night without incident, but still hypertensive today.    Review of Systems   Constitutional: Negative for fever.   Respiratory: Negative for shortness of breath.    Cardiovascular: Negative for chest pain and leg swelling.   Gastrointestinal: Positive for abdominal distention.   Neurological: Negative for headaches.   All other systems reviewed and are negative.    Objective:     Vital Signs (Most Recent):  Temp: 98.2 °F (36.8 °C) (01/25/18 1122)  Pulse: 90 (01/25/18 1122)  Resp: 20 (01/25/18 1122)  BP: (!) 162/100 (01/25/18 1122)  SpO2: 96 % (01/25/18 1122) Vital Signs (24h Range):  Temp:  [97.2 °F (36.2 °C)-98.6 °F (37 °C)] 98.2 °F (36.8 °C)  Pulse:  [85-98] 90  Resp:  [15-20] 20  SpO2:  [86 %-97 %] 96 %  BP: (149-207)/() 162/100     Weight: 59.2 kg (130 lb 8.2 oz)  Body mass index is 21.72 kg/m².    Intake/Output Summary (Last 24 hours) at 01/25/18 1228  Last data filed at 01/25/18 0600   Gross per 24 hour   Intake              740 ml   Output             3000 ml   Net            -2260 ml      Physical Exam   Constitutional: She is oriented to person, place, and time. She appears well-developed and well-nourished. No distress.   HENT:   Head: Normocephalic and atraumatic.   Nose: Nose normal.   Eyes: EOM are normal. Pupils are equal, round, and reactive to light. No scleral icterus.   Neck: Normal range of motion. No JVD present. No tracheal deviation present.   Cardiovascular: Normal rate, regular rhythm and normal heart sounds.  Exam reveals no gallop and no friction rub.    No murmur heard.  Pulmonary/Chest: Effort normal. No respiratory distress.   Decreased breath sounds at bilateral bases   Abdominal: Soft. She exhibits distension. She exhibits no mass. There is no tenderness. No hernia.   Musculoskeletal: She exhibits no edema or deformity.   LUE AVF with thrill   Neurological: She is alert and oriented to person, place, and time.    Skin: Skin is warm and dry. No rash noted. She is not diaphoretic.   Psychiatric: She has a normal mood and affect. Her behavior is normal.   Vitals reviewed.      Significant Labs:   Recent Lab Results       01/25/18  0424 01/24/18  2147      Immature Granulocytes 0.5      Immature Grans (Abs) 0.02      Anion Gap 11      Baso # 0.01      Basophil% 0.2      BUN, Bld 30(H)      Calcium 8.7      Chloride 102      CO2 23      Creatinine 9.5(H)      Differential Method Automated      eGFR if  5.9(A)      eGFR if non  5.1  Comment:  Calculation used to obtain the estimated glomerular filtration  rate (eGFR) is the CKD-EPI equation.   (A)      Eos # 0.2      Eosinophil% 5.4      Glucose 99      Gran # (ANC) 3.0      Gran% 72.4      Hematocrit 27.3(L)      Hemoglobin 8.6(L)      Lymph # 0.6(L)      Lymph% 13.9(L)      Magnesium 1.8      MCH 24.6(L)      MCHC 31.5(L)      MCV 78(L)      Mono # 0.3      Mono% 7.6      MPV SEE COMMENT  Comment:  Result not available.      nRBC 0      Phosphorus 5.9(H)      Platelets 66(L)      POCT Glucose  112(H)     Potassium 4.4      RBC 3.49(L)      RDW 16.9(H)      Sodium 136      Tacrolimus Lvl 7.8  Comment:  Testing performed by Liquid Chromatography-Tandem  Mass Spectrometry (LC-MS/MS).  This test was developed and its performance characteristics  determined by [facility], Department of Pathology  and Laboratory Medicine in a manner consistent with CLIA  requirements. It has not been cleared or approved by the [place]  Food and Drug Administration.  This test is used for clinical   purposes.  It should not be regarded as investigational or for  research.        WBC 4.10          All pertinent labs within the past 24 hours have been reviewed.    Significant Imaging: I have reviewed all pertinent imaging results/findings within the past 24 hours.

## 2018-01-25 NOTE — PROGRESS NOTES
[facility]  Nephrology  Progress Note    Patient Name: Holly Patel  MRN: [id number]  Admission Date: 1/24/2018  Hospital Length of Stay: 0 days  Attending Provider: Jacky Escalera, *   Primary Care Physician: Stan Sosa MD  Principal Problem:Hyperkalemia    Subjective:     HPI: No notes on file    Interval History: No acute events overnight. Blood pressure still high. Does not know names of home medications.     Review of patient's allergies indicates:   Allergen Reactions    Chloral hydrate      Other reaction(s): Hallucinations  Other reaction(s): Hives    Hydrocodone Other (See Comments)     Mental status changes     Current Facility-Administered Medications   Medication Frequency    0.9%  NaCl infusion PRN    0.9%  NaCl infusion Once    cephALEXin capsule 500 mg Q12H    cinacalcet tablet 90 mg Daily with breakfast    [START ON 1/26/2018] cloNIDine 0.2 mg/24 hr td ptwk 1 patch Every Fri    dextrose 50% injection 12.5 g PRN    dextrose 50% injection 25 g PRN    dicyclomine tablet 20 mg BID    famotidine tablet 20 mg Daily    furosemide tablet 100 mg BID    glucagon (human recombinant) injection 1 mg PRN    glucose chewable tablet 16 g PRN    glucose chewable tablet 24 g PRN    heparin (porcine) injection 5,000 Units Q8H    hydrALAZINE tablet 25 mg Q12H    labetalol tablet 800 mg Q8H    metroNIDAZOLE tablet 500 mg Q12H    NIFEdipine 24 hr tablet 60 mg Daily    ondansetron tablet 4 mg Q6H    oxyCODONE immediate release tablet 5 mg Q4H PRN    predniSONE tablet 1 mg Every other day    sodium chloride 0.9% flush 5 mL PRN    tacrolimus capsule 7 mg BID       Objective:     Vital Signs (Most Recent):  Temp: 98.2 °F (36.8 °C) (01/25/18 1122)  Pulse: 90 (01/25/18 1122)  Resp: 20 (01/25/18 1122)  BP: (!) 162/100 (01/25/18 1122)  SpO2: 96 % (01/25/18 1122)  O2 Device (Oxygen Therapy): room air (01/25/18 1122) Vital Signs (24h Range):  Temp:  [97.2 °F (36.2  °C)-98.6 °F (37 °C)] 98.2 °F (36.8 °C)  Pulse:  [85-98] 90  Resp:  [15-20] 20  SpO2:  [86 %-97 %] 96 %  BP: (149-207)/() 162/100     Weight: 59.2 kg (130 lb 8.2 oz) (01/24/18 1700)  Body mass index is 21.72 kg/m².  Body surface area is 1.65 meters squared.    I/O last 3 completed shifts:  In: 740 [P.O.:240; Other:500]  Out: 3000 [Other:3000]    Physical Exam   Constitutional: She is oriented to person, place, and time. Vital signs are normal. She appears well-developed.   Eyes: EOM are normal.   Neck: Normal range of motion. Neck supple.   Cardiovascular: Normal rate and regular rhythm.    Pulmonary/Chest: Effort normal and breath sounds normal.   Abdominal: Soft. Bowel sounds are normal.   Musculoskeletal: Normal range of motion. She exhibits no edema or deformity.   Neurological: She is alert and oriented to person, place, and time.   Skin: Skin is dry.   L RC AVG bruit and thrill.    Psychiatric: She has a normal mood and affect.       Significant Labs:  All labs within the past 24 hours have been reviewed.     Significant Imaging:  Labs: Reviewed    Assessment/Plan:     ESRD on hemodialysis    ESRD x 2 years on Home Dialysis Sun, Mon, Wed, Fri  under care of Dr. Bass at [facility]. EDW 59 kg. Usually net UF 2 L per tx but have missed a few treatments. Patient does not know home medications. Reports making good urine even up to 1 L a day?  -Lytes and acid/base stable at this time. Tolerating 2.5 L.   -Spoke with Home hemo RN Beverly> Home clonidine 0.2 patch, nifedipime 30 mg, labetaol 300 mg TID  -Strict IO. Add lasix 100 mg PO BID. Titrate antihypertensives above.   -Plan for HD tomorrow and UF goal to EDW.     BMD  Lab Results   Component Value Date    CALCIUM 8.7 01/25/2018    PHOS 5.9 (H) 01/25/2018   - continue home calcitriol 1 mcg bid, sensipar 90 mg, renvela pwdr 2.4g TID                Thank you for your consult. I will follow-up with patient. Please contact us if you have any additional  questions.    Savanah Daily NP  Nephrology  [facility]

## 2018-01-25 NOTE — ASSESSMENT & PLAN NOTE
- continue tacro; at last admission goal 6-8, at goal  - will need close f/u with Hepatology on discharge given concern for rejection; will need paracentesis re-scheduled

## 2018-01-25 NOTE — ASSESSMENT & PLAN NOTE
- usually on home HD TTS  - consult Nephrology for dialysis; patient is above dry weight by about 5kg  - anticipate additional session tomorrow with further fluid removal

## 2018-01-25 NOTE — ASSESSMENT & PLAN NOTE
- will need further w/u as outpatient  - continue tacro at 7mg bid as per last Hepatology note  - check tacro level

## 2018-01-25 NOTE — ASSESSMENT & PLAN NOTE
- usually on home HD TTS  - consult Nephrology for dialysis, which may help with volume overload and hyperkalemia

## 2018-01-25 NOTE — ASSESSMENT & PLAN NOTE
- will need further w/u as outpatient  - continue tacro at 7mg bid as per last Hepatology note  - monitor tacro level

## 2018-01-25 NOTE — MEDICAL/APP STUDENT
HISTORY & PHYSICAL  Hospital Medicine    Team: [facility] MED G   DOA: 1/24/18  Patient Class: IP  Attending Physician: Jacky Escalera MD  Hospital Team: [facility] Hospital Team ILIR Cano, 3rd Year Medical Student    PRESENTING HISTORY     Chief Complaint/Reason for Admission:  Uncontrolled HTN    History of Present Illness:   27 y.o. female with ESRD on hemodialysis at home (4x week) via PAL AVF, liver transplant(1992) with chronic rejection, renovascular HTN, immunosuppression, and thrombocytopenia who presented to the ED today complaining of persistent abdominal distension. She was scheduled for paracentesis this morning, but was sent to the ER after being found to be hypertensive. Pt endorses compliance with her antihypertensive medications but reports her blood pressure is frequently elevated. Complains of abdominal pain especially at night, dependent BLE edema and has had some intermittant SOB. She was last dialyzed yesterday. No chest pain, palpitations, fever, chills, cough, nausea/vomiting, diarrhea, dysuria, or headache/change in vision. Dry weight approximately 54-56kg.     Review of Systems:  Constitutional: Negative for fatigue.   Respiratory: Negative for cough, shortness of breath and wheezing.   Cardiovascular: Negative for chest pain and palpitations.   Gastrointestinal: Some abdominal discomfort. Negative for nausea/vomiting.   Genitourinary: Negative for dysuria, urgency, frequency and hematuria.   Skin: Negative for color change and rash.   Psychiatric/Behavioral: Negative for confusion.    PAST HISTORY:     Past Medical History:   Diagnosis Date    Anemia in ESRD (end-stage renal disease) 10/12/2015    Chronic rejection of liver transplant 3/22/2016    ESRD on hemodialysis 9/30/2015    History of splenomegaly 4/12/2016    Immunosuppressed 8/5/2017    Iron deficiency anemia secondary to inadequate dietary iron intake 8/16/2017    She receives IV iron periodically at the Dialysis  Center.   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 ()    Moderate protein-calorie malnutrition 2017    MRSA bacteremia 2017    Prophylactic immunotherapy 2014    Renovascular hypertension 10/2/2015    Secondary hyperparathyroidism 2017    Thrombocytopenia 2016     Past Surgical History:   Procedure Laterality Date     SECTION      2     LIVER TRANSPLANT  1992    TUBAL LIGATION       Family History   Problem Relation Age of Onset    Hypertension Mother     Hypertension Father      Social History     Social History    Marital status:      Social History Main Topics    Smoking status: Never Smoker    Smokeless tobacco: Never Used    Alcohol use No    Drug use: No    Sexual activity: Yes     Partners: Male     Social History Narrative    Lives 2 kids, 7 and 8, and nephew. Her mom helps with kids.       MEDICATIONS & ALLERGIES:     Current Outpatient Prescriptions on File Prior to Encounter   Medication Sig Dispense Refill    metroNIDAZOLE (FLAGYL) 500 MG tablet Take 1 tablet (500 mg total) by mouth every 12 (twelve) hours. 14 tablet 0    cephALEXin (KEFLEX) 500 MG capsule Take 1 capsule (500 mg total) by mouth every 12 (twelve) hours. 14 capsule 0    cinacalcet (SENSIPAR) 90 MG Tab Take 1 tablet (90 mg total) by mouth daily with breakfast. 30 tablet 3    cloNIDine 0.2 mg/24 hr td ptwk (CATAPRES) 0.2 mg/24 hr Place 1 patch onto the skin every 7 days. Change every Friday 4 patch 11    dicyclomine (BENTYL) 20 mg tablet Take 1 tablet (20 mg total) by mouth 2 (two) times daily. 20 tablet 0    famotidine (PEPCID) 20 MG tablet Take 1 tablet (20 mg total) by mouth 2 (two) times daily. 20 tablet 0    food supplemt, lactose-reduced (ENSURE ACTIVE HIGH PROTEIN) Liqd Take 236 mLs by mouth 2 (two) times daily. 60 Can 6    hydrALAZINE (APRESOLINE) 25 MG tablet Take 1 tablet (25 mg total) by mouth every 12 (twelve) hours. 60 tablet 1     labetalol (NORMODYNE) 300 MG tablet Take 2 tablets (600 mg total) by mouth every 8 (eight) hours. 180 tablet 2    NIFEdipine (PROCARDIA-XL) 30 MG (OSM) 24 hr tablet Take 1 tablet (30 mg total) by mouth once daily. 30 tablet 11    ondansetron (ZOFRAN) 4 MG tablet Take 1 tablet (4 mg total) by mouth every 6 (six) hours. 12 tablet 0    oxyCODONE (ROXICODONE) 5 MG immediate release tablet Take 1 tablet (5 mg total) by mouth every 4 (four) hours as needed for Pain. 12 tablet 0    predniSONE (DELTASONE) 1 MG tablet Take 1 mg by mouth every other day.      tacrolimus (PROGRAF) 1 MG Cap Take 6 capsules (6 mg total) by mouth every 12 (twelve) hours. 360 capsule 11     Allergen Reactions    Chloral hydrate      Other reaction(s): Hallucinations  Other reaction(s): Hives    Hydrocodone Other (See Comments)     Mental status changes     ED Management:  Pt was treated with oral blood pressure medications clonidine and procardia with minimal improvement of her blood pressure. She is admitted for medication adjustment.   Pt was also noted to be hyperkalemic with potassium of 5.6 on labs despite being 1 day status post dialysis. She was treated with oral kayexalate and admitted to medicine for further evaluation and treatment      OBJECTIVE:     Vital Signs:  Temp:  [97.9 °F (36.6 °C)-98.6 °F (37 °C)] 98.6 °F (37 °C)  Pulse:  [84-98] 98  Resp:  [16-20] 16  SpO2:  [86 %-98 %] 86 %  BP: (149-207)/() 149/85  Body mass index is 21.72 kg/m².     Physical Exam:  Constitutional: chronically ill young black female  Head: Normocephalic and atraumatic.   Eyes: Pale conjunctiva    Cardiovascular: 2/6 systolic ejection murmur, JVD  Pulmonary/Chest: Crackles bilaterally    Abdomin: Soft. Slight distension  Musculoskeletal: edema to both lower extremities   Neurological: She is alert and oriented to person, place, and time. 5/5 strength, sensation normal  Skin: Warm and dry   Psychiatric: She has a normal mood and affect.      Laboratory  Lab Results   Component Value Date    WBC 3.95 01/24/2018    HGB 9.5 (L) 01/24/2018    HCT 29.8 (L) 01/24/2018    MCV 80 (L) 01/24/2018    PLT 69 (L) 01/24/2018       Recent Labs  Lab 01/24/18  1208   GLU 97      K 5.6*      CO2 22*   BUN 70*   CREATININE 17.3*   CALCIUM 8.9     Lab Results   Component Value Date    INR 1.1 01/18/2018    INR 1.1 01/04/2018    INR 1.0 08/05/2017     Lab Results   Component Value Date    HGBA1C 4.5 01/24/2018     No results for input(s): POCTGLUCOSE in the last 72 hours.    Diagnostic Results:  Troponin I   0.038   EKG: Normal sinus rhythm. Possible Left atrial enlargement. Borderline Abnormal ECG. When compared with ECG of 18-JAN-2018 18:28, No significant change was found    CXR: Cardiac size is enlarged.  Pleural fluid and patchy air space consolidation can be seen bilaterally.  Pulmonary vascularity is slightly accentuated.  I suspect it is a little more cardiac decompensation on today's film than was noted last week.    ASSESSMENT & PLAN:   27 y.o. female with ESRD on hemodialysis at home (4x week) via PAL AVF, liver transplant(1992) with chronic rejection, renovascular HTN, immunosuppression, and thrombocytopenia who presented to the ED today complaining of persistent abdominal distension.    Current Problems List:  Active Hospital Problems    Diagnosis  POA    Uncontrolled hypertension [I10]  Yes    Hyperkalemia [E87.5]  Yes    Urinary tract infection without hematuria [N39.0]  Yes    Chronic rejection of liver transplant [T86.41]  Yes    ESRD on hemodialysis [N18.6, Z99.2]  Not Applicable     Chronic   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 [Z94.4]  Not Applicable     Chronic     hemangioendothelioma s/p LTx (1992)       Problem Assessment & Treatment Plan:    1. ESRD   - HD for clearance and volume management done tonight    - Ultrafiltration goal 2-3L.    - Dialysate bath has been adjusted according to the current labs.     2. Hyperkalemia  -  received oral kayexalate in the ED  - HD tonight - see above  - no characteristic symptoms or EKG signs  - recheck BMP    3. Anemia  - Hb stable, continue EPO as outpatient      4. Metabolic Bone Disease:  - Continue cincalcet daily. Checking phosphate level tomorrow morning      5. HTN:  - resume home dose meds   - increase nifedipine dose to 30mg daily and monitor BP  - consider fundus exam? Referral to optometry or ophthal     Signing Student:  Derek Cano

## 2018-01-25 NOTE — PROGRESS NOTES
3hr dialsyis tx competed with net UF of 2.5L. Tolerated well. Rinsed back blood. Pulled out needles. Pressure held and hemostasis achieved. Positive thrill and bruit. Report given to EDMOND Hathaway.

## 2018-01-25 NOTE — PLAN OF CARE
CM met with patient for discharge planning.  Patient states she does her dialysis at home and [facility] is where she gets her supplies.  Plan is to discharge home with her mother.    Pharmacy:    [id number] - [place], [place] - [address]  [address]  [place] [address]  Phone: [phone] Fax: [phone]    PCP:  Stan Sosa MD    Payor: MEDICARE / Plan: MEDICARE PART A & B / Product Type: [facility] /      01/25/18 1246   Discharge Assessment   Assessment Type Discharge Planning Assessment   Confirmed/corrected address and phone number on facesheet? Yes   Assessment information obtained from? Patient   Expected Length of Stay (days) 2   Communicated expected length of stay with patient/caregiver yes   Prior to hospitilization cognitive status: Alert/Oriented   Prior to hospitalization functional status: Independent   Current cognitive status: Alert/Oriented   Current Functional Status: Independent   Facility Arrived From: Emergency   Lives With parent(s)   Able to Return to Prior Arrangements yes   Is patient able to care for self after discharge? Yes   Who are your caregiver(s) and their phone number(s)? Myrna Randolph - mother [phone]   Patient's perception of discharge disposition home or selfcare   Readmission Within The Last 30 Days no previous admission in last 30 days   Patient currently being followed by outpatient case management? No   Patient currently receives home health services? No   Patient currently receives any other outside agency services? No   Equipment Currently Used at Home none   Do you have any problems affording any of your prescribed medications? No   Is the patient taking medications as prescribed? yes   Does the patient have transportation home? Yes   Transportation Available family or friend will provide   Dialysis Name and Scheduled days Does dialysis at home, [facility] for supplies   Does the patient receive services at the [facility]? No   Discharge Plan A Home  with family   Discharge Plan B Home Health   Patient/Family In Agreement With Plan yes

## 2018-01-25 NOTE — ASSESSMENT & PLAN NOTE
- continue tacro and check level in am  - will need close f/u with Hepatology on discharge given concern for rejection; will need paracentesis re-scheduled

## 2018-01-25 NOTE — MEDICAL/APP STUDENT
Progress Note  Hospital Medicine    Patient Name: Holly Patel  YOB: 1990    Admit Date: 1/24/2018                     LOS: 0    SUBJECTIVE:     Reason for Admission:  Hyperkalemia    HPI:  27 y.o. female with ESRD on hemodialysis at home (4x week) via PAL AVF, liver transplant(1992) with chronic rejection, renovascular HTN, immunosuppression, and thrombocytopenia who presented to the ED today complaining of persistent abdominal distension. She was scheduled for paracentesis this morning, but was sent to the ER after being found to be hypertensive. Pt endorses compliance with her antihypertensive medications but reports her blood pressure is frequently elevated. Complains of abdominal pain especially at night, dependent BLE edema and has had some intermittant SOB. She was last dialyzed yesterday. No chest pain, palpitations, fever, chills, cough, nausea/vomiting, diarrhea, dysuria, or headache/change in vision. Dry weight approximately 54-56kg.    Hospital Course:  1/24: received 3hrs dialysis with net UF of 2.5L. Tolerated well.     1/25: K+ down to 4.4 from 5.6, BP still elevated (most recent 171/102). Receiving dialysis again, probably not until tomorrow. Tacrolimus level pending    Interval history:   No acute events overnight. Patient developed a headache behind her right eye for 10mins during her dialysis treatment last night. Still some abdominal discomfort and peritoneal fluid evident. Has not passed a bowel movement but is producing urine and denies dysuria. Denies chest pain, SOB, fever/chills, light headedness.    ROS:  Constitutional: Negative for fatigue.   Respiratory: Negative for cough, shortness of breath and wheezing.   Cardiovascular: Negative for chest pain and palpitations.   Gastrointestinal: Some abdominal discomfort. Negative for nausea/vomiting.   Genitourinary: Negative for dysuria, urgency, frequency and hematuria.   Skin: Negative for color change and rash.    Psychiatric/Behavioral: Negative for confusion.    OBJECTIVE:     Vital Signs Range (Last 24H):  Temp:  [97.2 °F (36.2 °C)-98.6 °F (37 °C)]   Pulse:  [84-98]   Resp:  [15-20]   BP: (149-207)/()   SpO2:  [86 %-98 %] Body mass index is 21.72 kg/m².  Wt Readings from Last 1 Encounters:   01/24/18 1700 59.2 kg (130 lb 8.2 oz)   01/24/18 1130 61.2 kg (135 lb)     I & O (Last 24H):  Intake/Output Summary (Last 24 hours) at 01/25/18 0702  Last data filed at 01/25/18 0600   Gross per 24 hour   Intake              740 ml   Output             3000 ml   Net            -2260 ml     Physical Exam:  Constitutional: chronically ill young black female. No distress.  Head: Normocephalic and atraumatic.   Eyes: Pale conjunctiva.    Cardiovascular: Systolic murmur, JVD  Pulmonary/Chest: Breath sounds normal, no added or adventitious sounds.   Abdomin: Soft. Slight distension. No guarding or rebound tenderness.  Musculoskeletal: No edema. Left AVF with good thrill.  Neurological: She is alert and oriented to person, place, and time. 5/5 strength, sensation normal  Skin: Warm and dry. Not diaphoretic.  Psychiatric: She has a normal mood and affect.     Diagnostic Results:  Lab Results   Component Value Date    WBC 4.10 01/25/2018    HGB 8.6 (L) 01/25/2018    HCT 27.3 (L) 01/25/2018    MCV 78 (L) 01/25/2018    PLT 66 (L) 01/25/2018       Recent Labs  Lab 01/25/18  0424   GLU 99      K 4.4      CO2 23   BUN 30*   CREATININE 9.5*   CALCIUM 8.7   MG 1.8     Lab Results   Component Value Date    INR 1.1 01/18/2018    INR 1.1 01/04/2018    INR 1.0 08/05/2017     Lab Results   Component Value Date    HGBA1C 4.5 01/24/2018     Recent Labs      01/24/18   2147   POCTGLUCOSE  112*     Dialysis on 1/24:  3hr dialsyis tx competed with net UF of 2.5L. Toerated well. Rinsed back blood. Pulled out needles. Pressure held and hemostasis achieved. Positive thrill and bruit.    Tacrolimus level: pending    ASSESSMENT/PLAN:   27 y.o.  female with ESRD on hemodialysis at home (4x week) via PAL AVF, liver transplant(1992) with chronic rejection, renovascular HTN, immunosuppression, and thrombocytopenia who presented to the ED with hyperkalemia and complaining of persistent abdominal distension.    Active Hospital Problems    Diagnosis  POA    *Hyperkalemia [E87.5]  Yes    Uncontrolled hypertension [I10]  Yes    Urinary tract infection without hematuria [N39.0]  Yes    Chronic rejection of liver transplant [T86.41]  Yes    ESRD on hemodialysis [N18.6, Z99.2]  Not Applicable     Chronic   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 [Z94.4]  Not Applicable     Chronic     hemangioendothelioma s/p LTx (1992)        Resolved Hospital Problems    Diagnosis Date Resolved POA   No resolved problems to display.     Problems Addressed Today:  Hyperkalemia     - resolved after dialysis treatment          Uncontrolled hypertension     - appears to be a chronic issue, BP in clinic a week ago was in the same range a week ago  - could be side effect of tacrolimus / progressive fluid accumulation with inadequate HD as outpatient / or medication non-compliance  - consulted Nephrology for HD  - continuing home labetalol 600 tid, hydralazine 25 bid, clonidine patch 0.2mg/2rhrs change weekly, and nifedipine 30mg  - tacrolimus level pending          Urinary tract infection without hematuria     - continue outpatient keflex and flagyl for Trich          Chronic rejection of liver transplant     - will need close follow up as outpatient with hepatology  - continue tacrolimus at 7mg bid as per last Hepatology note  - tacro level pending          ESRD on hemodialysis     - usually on home HD 4x per week (SMTT)  - consult Nephrology for dialysis, needs again today (1/25) but they are full. Well get it done tomorrow  - may need to adjust dialysis regimen/location          Liver replaced by transplant     - continue tacro and check level (pending)  - will need close f/u with  Hepatology on discharge given concern for rejection; needs paracentesis re-scheduled     Signing Student:  Derek Cano

## 2018-01-25 NOTE — H&P
[facility] Hospital Medicine  History & Physical    Patient Name: Holly Patel  MRN: [id number]  Admission Date: 1/24/2018  Attending Physician: Jacky Escalera MD  Primary Care Provider: Stan Sosa MD    [facility] Medicine Team: [facility]  Jacky Escalera MD     Patient information was obtained from patient, parent, past medical records and ER records.     Subjective:     Principal Problem:Hyperkalemia    Chief Complaint:   Chief Complaint   Patient presents with    Hypertension     was upstairs and supposed to get a paracentesis. sent to er for hypertension. liver txp in 1992        HPI: Ms. Patel is a 26yo woman with hx of liver transplant in '92 complicated by ESRD on home HD who presents from outpatient IR suite with complaint of asymptomatic hypertension. Of note, she has been having increased abdominal distension with ascites over the past few weeks. She is following closely with Hepatology, who are concerned that this may represent rejection and were planning on diagnostic/therapeutic paracentesis. She states she has been compliant with all medications, including her tacrolimus, although she does not know the doses of any of her medications and insists I look them up in the computer. She is otherwise asymptomatic; she denies headache, vision changes, chest pain, nausea, vomiting, abdominal pain, leg swelling, palpitations. Last episode of dialysis was yesterday. She has not changed her HD regimen recently. She does endorse some progressive shortness of breath which has worsened over past few weeks with her abdominal swelling. Of note, at last visit to Hepatology clinic, Bps were in 180s/100 and she was asymptomatic at that time.    Past Medical History:   Diagnosis Date    Anemia in ESRD (end-stage renal disease) 10/12/2015    Chronic rejection of liver transplant 3/22/2016    ESRD on hemodialysis 9/30/2015    History of splenomegaly 4/12/2016     Immunosuppressed 2017    Iron deficiency anemia secondary to inadequate dietary iron intake 2017    She [facility] iron [facility] at the [facility].   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 ()    Moderate protein-calorie malnutrition 2017    MRSA bacteremia 2017    Prophylactic immunotherapy 2014    Renovascular hypertension 10/2/2015    Secondary hyperparathyroidism 2017    Thrombocytopenia 2016       Past Surgical History:   Procedure Laterality Date     SECTION      2     KIDNEY TRANSPLANT      LIVER BIOPSY      LIVER TRANSPLANT  1992    TUBAL LIGATION         Review of patient's allergies indicates:   Allergen Reactions    Chloral hydrate      Other reaction(s): Hallucinations  Other reaction(s): Hives    Hydrocodone Other (See Comments)     Mental status changes       No current facility-administered medications on file prior to encounter.      Current Outpatient Prescriptions on File Prior to Encounter   Medication Sig    metroNIDAZOLE (FLAGYL) 500 MG tablet Take 1 tablet (500 mg total) by mouth every 12 (twelve) hours.    cephALEXin (KEFLEX) 500 MG capsule Take 1 capsule (500 mg total) by mouth every 12 (twelve) hours.    cinacalcet (SENSIPAR) 90 MG Tab Take 1 tablet (90 mg total) by mouth daily with breakfast.    cloNIDine 0.2 mg/24 hr td ptwk (CATAPRES) 0.2 mg/24 hr Place 1 patch onto the skin every 7 days. Change every Friday    dicyclomine (BENTYL) 20 mg tablet Take 1 tablet (20 mg total) by mouth 2 (two) times daily.    famotidine (PEPCID) 20 MG tablet Take 1 tablet (20 mg total) by mouth 2 (two) times daily.    food supplemt, lactose-reduced (ENSURE ACTIVE HIGH PROTEIN) Liqd Take 236 mLs by mouth 2 (two) times daily.    hydrALAZINE (APRESOLINE) 25 MG tablet Take 1 tablet (25 mg total) by mouth every 12 (twelve) hours.    labetalol (NORMODYNE) 300 MG tablet Take 2 tablets (600 mg total) by  mouth every 8 (eight) hours.    NIFEdipine (PROCARDIA-XL) 30 MG (OSM) 24 hr tablet Take 1 tablet (30 mg total) by mouth once daily.    ondansetron (ZOFRAN) 4 MG tablet Take 1 tablet (4 mg total) by mouth every 6 (six) hours.    oxyCODONE (ROXICODONE) 5 MG immediate release tablet Take 1 tablet (5 mg total) by mouth every 4 (four) hours as needed for Pain.    predniSONE (DELTASONE) 1 MG tablet Take 1 mg by mouth every other day.    tacrolimus (PROGRAF) 1 MG Cap Take 6 capsules (6 mg total) by mouth every 12 (twelve) hours.    triamcinolone acetonide 0.1% (KENALOG) 0.1 % ointment AAA on arms, legs, and neck bid x 1-2 wks then prn flares only (Patient taking differently: Apply to affected area(s) on arms, legs, and neck twice daily x 1-2 wks then as needed for flares only)     Family History     Problem Relation (Age of Onset)    Hypertension Mother, Father        Social History Main Topics    Smoking status: Never Smoker    Smokeless tobacco: Never Used    Alcohol use No    Drug use: No    Sexual activity: Yes     Partners: Male     Review of Systems   Constitutional: Negative for chills, diaphoresis and fever.   HENT: Negative for congestion and sore throat.    Eyes: Negative for discharge and visual disturbance.   Respiratory: Positive for shortness of breath. Negative for cough.    Cardiovascular: Negative for chest pain and leg swelling.   Gastrointestinal: Positive for abdominal distention, nausea and vomiting. Negative for abdominal pain.   Genitourinary: Positive for dysuria. Negative for difficulty urinating.   Musculoskeletal: Negative for arthralgias and joint swelling.   Skin: Negative for rash and wound.   Allergic/Immunologic: Negative for immunocompromised state.   Neurological: Negative for light-headedness and headaches.   Psychiatric/Behavioral: Negative for agitation and confusion.     Objective:     Vital Signs (Most Recent):  Temp: 97.9 °F (36.6 °C) (01/24/18 1130)  Pulse: 85 (01/24/18  1431)  Resp: 18 (01/24/18 1130)  BP: (!) 189/124 (01/24/18 1431)  SpO2: 97 % (01/24/18 1431) Vital Signs (24h Range):  Temp:  [97.9 °F (36.6 °C)] 97.9 °F (36.6 °C)  Pulse:  [84-89] 85  Resp:  [16-18] 18  SpO2:  [94 %-98 %] 97 %  BP: (189-207)/(124-134) 189/124     Weight: 61.2 kg (135 lb)  Body mass index is 22.47 kg/m².    Physical Exam   Constitutional: She is oriented to person, place, and time. She appears well-developed and well-nourished. No distress.   HENT:   Head: Normocephalic and atraumatic.   Nose: Nose normal.   Eyes: EOM are normal. Pupils are equal, round, and reactive to light. No scleral icterus.   Neck: Normal range of motion. No JVD present. No tracheal deviation present.   Cardiovascular: Normal rate, regular rhythm and normal heart sounds.  Exam reveals no gallop and no friction rub.    No murmur heard.  Pulmonary/Chest: Effort normal. No respiratory distress.   Decreased breath sounds at bilateral bases   Abdominal: Soft. She exhibits no distension and no mass. There is no tenderness. No hernia.   Musculoskeletal: She exhibits no edema or deformity.   Left AVF with good thrill   Neurological: She is alert and oriented to person, place, and time.   Skin: Skin is warm and dry. No rash noted. She is not diaphoretic.   Psychiatric: She has a normal mood and affect. Her behavior is normal.   Vitals reviewed.        CRANIAL NERVES     CN III, IV, VI   Pupils are equal, round, and reactive to light.  Extraocular motions are normal.        Significant Labs:   Recent Lab Results       01/24/18  1208      Immature Granulocytes 0.8(H)     Immature Grans (Abs) 0.03     Albumin 2.6(L)     Alkaline Phosphatase 598(H)     ALT 25     Anion Gap 15     AST 31     Baso # 0.01     Basophil% 0.3     Total Bilirubin 0.5  Comment:  For infants and newborns, interpretation of results should be based  on gestational age, weight and in agreement with clinical  observations.  Premature Infant recommended reference  ranges:  Up to 24 hours.............<8.0 mg/dL  Up to 48 hours............<12.0 mg/dL  3-5 days..................<15.0 mg/dL  6-29 days.................<15.0 mg/dL       BUN, Bld 70(H)     Calcium 8.9     Chloride 101     CO2 22(L)     Creatinine 17.3(H)     Differential Method Automated     eGFR if African American 2.8(A)     eGFR if non  2.5  Comment:  Calculation used to obtain the estimated glomerular filtration  rate (eGFR) is the CKD-EPI equation.   (A)     Eos # 0.3     Eosinophil% 7.8     Estimated Avg Glucose 82     Glucose 97     Gran # 2.7     Gran% 69.4     Hematocrit 29.8(L)     Hemoglobin 9.5(L)     Hemoglobin A1C 4.5  Comment:  According to ADA guidelines, hemoglobin A1c <7.0% represents  optimal control in non-pregnant diabetic patients. Different  metrics may apply to specific patient populations.   Standards of Medical Care in Diabetes-2016.  For the purpose of screening for the presence of diabetes:  <5.7%     Consistent with the absence of diabetes  5.7-6.4%  Consistent with increasing risk for diabetes   (prediabetes)  >or=6.5%  Consistent with diabetes  Currently, no consensus exists for use of hemoglobin A1c  for diagnosis of diabetes for children.  This Hemoglobin A1c assay has significant interference with fetal   hemoglobin   (HbF). The results are invalid for patients with abnormal amounts of   HbF,   including those with known Hereditary Persistence   of Fetal Hemoglobin. Heterozygous hemoglobin variants (HbAS, HbAC,   HbAD, HbAE, HbA2) do not significantly interfere with this assay;   however, presence of multiple variants in a sample may impact the %   interference.       Lymph # 0.6(L)     Lymph% 15.4(L)     MCH 25.4(L)     MCHC 31.9(L)     MCV 80(L)     Mono # 0.3     Mono% 6.3     MPV SEE COMMENT  Comment:  Result not available.     nRBC 0     Platelets 69(L)     Potassium 5.6  Comment:  *No Visible Hemolysis(H)     Total Protein 7.3     RBC 3.74(L)     RDW 16.9(H)      Sodium 138     Troponin I 0.038  Comment:  The reference interval for Troponin I represents the 99th percentile   cutoff   for our facility and is consistent with 3rd generation assay   performance.  (H)     WBC 3.95         All pertinent labs within the past 24 hours have been reviewed.    Significant Imaging: I have reviewed all pertinent imaging results/findings within the past 24 hours.    Assessment/Plan:     * Hyperkalemia    - likely 2/2 volume overload with accumulation of K  - see ESRD          Uncontrolled hypertension    - appears to be somewhat of a chronic issue, as last BP in clinic a week ago was roughly in the same range  - potentially side effect of tacrolimus vs progressive fluid accumulation with inadequate HD as outpatient vs medication non-compliance  - consult Nephrology for HD  - continue home labetalol 600 tid, hydralazine 25 bid, clonidine patch 0.2/wk, and nifedipine 30  - will check tacro level          Urinary tract infection without hematuria    - continue outpatient keflex and flagyl for Trich          Chronic rejection of liver transplant    - will need further w/u as outpatient  - continue tacro at 7mg bid as per last Hepatology note  - check tacro level          ESRD on hemodialysis    - usually on home HD TTS  - consult Nephrology for dialysis, which may help with volume overload and hyperkalemia          Liver replaced by transplant    - continue tacro and check level in am  - will need close f/u with Hepatology on discharge given concern for rejection; will need paracentesis re-scheduled            VTE Risk Mitigation         Ordered     heparin (porcine) injection 5,000 Units  Every 8 hours     Route:  Subcutaneous        01/24/18 1428     Medium Risk of VTE  Once      01/24/18 1428             Jacky Escalera MD  Department of Hospital Medicine   [facility]

## 2018-01-25 NOTE — PROGRESS NOTES
[facility] Hospital Medicine  Progress Note    Patient Name: Holly Patel  MRN: [id number]  Patient Class: OP- Observation   Admission Date: 1/24/2018  Length of Stay: 0 days  Attending Physician: Jacky Escalera, *  Primary Care Provider: Stan Sosa MD    [facility] Medicine Team: [facility] HOSP MED G Jacky Escalera MD    Subjective:     Principal Problem:Hyperkalemia    HPI:  Ms. Patel is a 28yo woman with hx of liver transplant in '92 complicated by ESRD on home HD who presents from outpatient IR suite with complaint of asymptomatic hypertension. Of note, she has been having increased abdominal distension with ascites over the past few weeks. She is following closely with Hepatology, who are concerned that this may represent rejection and were planning on diagnostic/therapeutic paracentesis. She states she has been compliant with all medications, including her tacrolimus, although she does not know the doses of any of her medications and insists I look them up in the computer. She is otherwise asymptomatic; she denies headache, vision changes, chest pain, nausea, vomiting, abdominal pain, leg swelling, palpitations. Last episode of dialysis was yesterday. She has not changed her HD regimen recently. She does endorse some progressive shortness of breath which has worsened over past few weeks with her abdominal swelling. Of note, at last visit to Hepatology clinic, Bps were in 180s/100 and she was asymptomatic at that time.    Hospital Course:  Ms. Patel presented to the ED with BP in 200/120s from IR where she was scheduled to get outpatient paracentesis. K found to be 5.6. Nephrology consulted for dialysis and tacro level ordered.    Interval History: Doing well; denies SOB, Cp, Ha. Received HD last night without incident, but still hypertensive today.    Review of Systems   Constitutional: Negative for fever.   Respiratory: Negative for shortness of breath.     Cardiovascular: Negative for chest pain and leg swelling.   Gastrointestinal: Positive for abdominal distention.   Neurological: Negative for headaches.   All other systems reviewed and are negative.    Objective:     Vital Signs (Most Recent):  Temp: 98.2 °F (36.8 °C) (01/25/18 1122)  Pulse: 90 (01/25/18 1122)  Resp: 20 (01/25/18 1122)  BP: (!) 162/100 (01/25/18 1122)  SpO2: 96 % (01/25/18 1122) Vital Signs (24h Range):  Temp:  [97.2 °F (36.2 °C)-98.6 °F (37 °C)] 98.2 °F (36.8 °C)  Pulse:  [85-98] 90  Resp:  [15-20] 20  SpO2:  [86 %-97 %] 96 %  BP: (149-207)/() 162/100     Weight: 59.2 kg (130 lb 8.2 oz)  Body mass index is 21.72 kg/m².    Intake/Output Summary (Last 24 hours) at 01/25/18 1228  Last data filed at 01/25/18 0600   Gross per 24 hour   Intake              740 ml   Output             3000 ml   Net            -2260 ml      Physical Exam   Constitutional: She is oriented to person, place, and time. She appears well-developed and well-nourished. No distress.   HENT:   Head: Normocephalic and atraumatic.   Nose: Nose normal.   Eyes: EOM are normal. Pupils are equal, round, and reactive to light. No scleral icterus.   Neck: Normal range of motion. No JVD present. No tracheal deviation present.   Cardiovascular: Normal rate, regular rhythm and normal heart sounds.  Exam reveals no gallop and no friction rub.    No murmur heard.  Pulmonary/Chest: Effort normal. No respiratory distress.   Decreased breath sounds at bilateral bases   Abdominal: Soft. She exhibits distension. She exhibits no mass. There is no tenderness. No hernia.   Musculoskeletal: She exhibits no edema or deformity.   LUE AVF with thrill   Neurological: She is alert and oriented to person, place, and time.   Skin: Skin is warm and dry. No rash noted. She is not diaphoretic.   Psychiatric: She has a normal mood and affect. Her behavior is normal.   Vitals reviewed.      Significant Labs:   Recent Lab Results       01/25/18  1178  01/24/18  2147      Immature Granulocytes 0.5      Immature Grans (Abs) 0.02      Anion Gap 11      Baso # 0.01      Basophil% 0.2      BUN, Bld 30(H)      Calcium 8.7      Chloride 102      CO2 23      Creatinine 9.5(H)      Differential Method Automated      eGFR if  5.9(A)      eGFR if non  5.1  Comment:  Calculation used to obtain the estimated glomerular filtration  rate (eGFR) is the CKD-EPI equation.   (A)      Eos # 0.2      Eosinophil% 5.4      Glucose 99      Gran # (ANC) 3.0      Gran% 72.4      Hematocrit 27.3(L)      Hemoglobin 8.6(L)      Lymph # 0.6(L)      Lymph% 13.9(L)      Magnesium 1.8      MCH 24.6(L)      MCHC 31.5(L)      MCV 78(L)      Mono # 0.3      Mono% 7.6      MPV SEE COMMENT  Comment:  Result not available.      nRBC 0      Phosphorus 5.9(H)      Platelets 66(L)      POCT Glucose  112(H)     Potassium 4.4      RBC 3.49(L)      RDW 16.9(H)      Sodium 136      Tacrolimus Lvl 7.8  Comment:  Testing performed by Liquid Chromatography-Tandem  Mass Spectrometry (LC-MS/MS).  This test was developed and its performance characteristics  determined by [facility], Department of Pathology  and Laboratory Medicine in a manner consistent with CLIA  requirements. It has not been cleared or approved by the [place]  Food and Drug Administration.  This test is used for clinical   purposes.  It should not be regarded as investigational or for  research.        WBC 4.10          All pertinent labs within the past 24 hours have been reviewed.    Significant Imaging: I have reviewed all pertinent imaging results/findings within the past 24 hours.    Assessment/Plan:      * Hyperkalemia    - likely 2/2 volume overload with accumulation of K  - resolved s/p HD          Uncontrolled hypertension    - appears to be somewhat of a chronic issue, as last BP in clinic a week ago was roughly in the same range  - potentially side effect of tacrolimus vs progressive fluid  accumulation with inadequate HD as outpatient vs medication non-compliance  - consult Nephrology for HD  - increase home labetalol to 800 tid and nifedipine to 60; continue home hydralazine 25 bid, clonidine patch 0.2/wk  - add lasix 100 bid as patient is still making urine          Urinary tract infection without hematuria    - continue outpatient keflex and flagyl for Trich          Chronic rejection of liver transplant    - will need further w/u as outpatient  - continue tacro at 7mg bid as per last Hepatology note  - monitor tacro level          ESRD on hemodialysis    - usually on home HD TTS  - consult Nephrology for dialysis; patient is above dry weight by about 5kg  - anticipate additional session tomorrow with further fluid removal          Liver replaced by transplant    - continue tacro; at last admission goal 6-8, at goal  - will need close f/u with Hepatology on discharge given concern for rejection; will need paracentesis re-scheduled            VTE Risk Mitigation         Ordered     heparin (porcine) injection 5,000 Units  Every 8 hours     Route:  Subcutaneous        01/24/18 1428     Medium Risk of VTE  Once      01/24/18 1428              Jacky Escalera MD  Department of Hospital Medicine   [facility]

## 2018-01-25 NOTE — ASSESSMENT & PLAN NOTE
- appears to be somewhat of a chronic issue, as last BP in clinic a week ago was roughly in the same range  - potentially side effect of tacrolimus vs progressive fluid accumulation with inadequate HD as outpatient vs medication non-compliance  - consult Nephrology for HD  - increase home labetalol to 800 tid and nifedipine to 60; continue home hydralazine 25 bid, clonidine patch 0.2/wk  - add lasix 100 bid as patient is still making urine

## 2018-01-26 VITALS
HEIGHT: 65 IN | TEMPERATURE: 98 F | RESPIRATION RATE: 18 BRPM | OXYGEN SATURATION: 98 % | SYSTOLIC BLOOD PRESSURE: 147 MMHG | BODY MASS INDEX: 21.74 KG/M2 | HEART RATE: 87 BPM | WEIGHT: 130.5 LBS | DIASTOLIC BLOOD PRESSURE: 84 MMHG

## 2018-01-26 PROBLEM — E87.5 HYPERKALEMIA: Status: RESOLVED | Noted: 2018-01-24 | Resolved: 2018-01-26

## 2018-01-26 LAB
ALBUMIN SERPL BCP-MCNC: 2.2 G/DL
ANION GAP SERPL CALC-SCNC: 16 MMOL/L
BASOPHILS # BLD AUTO: 0 K/UL
BASOPHILS NFR BLD: 0 %
BUN SERPL-MCNC: 34 MG/DL
CA-I BLDV-SCNC: 0.97 MMOL/L
CALCIUM SERPL-MCNC: 6.8 MG/DL
CHLORIDE SERPL-SCNC: 101 MMOL/L
CO2 SERPL-SCNC: 19 MMOL/L
CREAT SERPL-MCNC: 11.8 MG/DL
DIFFERENTIAL METHOD: ABNORMAL
EOSINOPHIL # BLD AUTO: 0.2 K/UL
EOSINOPHIL NFR BLD: 5.2 %
ERYTHROCYTE [DISTWIDTH] IN BLOOD BY AUTOMATED COUNT: 17 %
EST. GFR  (AFRICAN AMERICAN): 4.5 ML/MIN/1.73 M^2
EST. GFR  (NON AFRICAN AMERICAN): 3.9 ML/MIN/1.73 M^2
GLUCOSE SERPL-MCNC: 84 MG/DL
HCT VFR BLD AUTO: 28.3 %
HGB BLD-MCNC: 9.2 G/DL
IMM GRANULOCYTES # BLD AUTO: 0.04 K/UL
IMM GRANULOCYTES NFR BLD AUTO: 1.3 %
LYMPHOCYTES # BLD AUTO: 0.9 K/UL
LYMPHOCYTES NFR BLD: 28.2 %
MAGNESIUM SERPL-MCNC: 1.8 MG/DL
MCH RBC QN AUTO: 25.8 PG
MCHC RBC AUTO-ENTMCNC: 32.5 G/DL
MCV RBC AUTO: 80 FL
MONOCYTES # BLD AUTO: 0.3 K/UL
MONOCYTES NFR BLD: 8.4 %
NEUTROPHILS # BLD AUTO: 1.8 K/UL
NEUTROPHILS NFR BLD: 56.9 %
NRBC BLD-RTO: 0 /100 WBC
PHOSPHATE SERPL-MCNC: 6.4 MG/DL
PLATELET # BLD AUTO: 81 K/UL
PMV BLD AUTO: 9.5 FL
POTASSIUM SERPL-SCNC: 4.6 MMOL/L
RBC # BLD AUTO: 3.56 M/UL
SODIUM SERPL-SCNC: 136 MMOL/L
TACROLIMUS BLD-MCNC: 13 NG/ML
WBC # BLD AUTO: 3.09 K/UL

## 2018-01-26 PROCEDURE — 84100 ASSAY OF PHOSPHORUS: CPT

## 2018-01-26 PROCEDURE — G0257 UNSCHED DIALYSIS ESRD PT HOS: HCPCS

## 2018-01-26 PROCEDURE — 36415 COLL VENOUS BLD VENIPUNCTURE: CPT

## 2018-01-26 PROCEDURE — 82040 ASSAY OF SERUM ALBUMIN: CPT

## 2018-01-26 PROCEDURE — 82330 ASSAY OF CALCIUM: CPT

## 2018-01-26 PROCEDURE — 25000003 PHARM REV CODE 250: Performed by: NURSE PRACTITIONER

## 2018-01-26 PROCEDURE — 85025 COMPLETE CBC W/AUTO DIFF WBC: CPT

## 2018-01-26 PROCEDURE — 25000003 PHARM REV CODE 250: Performed by: HOSPITALIST

## 2018-01-26 PROCEDURE — 83735 ASSAY OF MAGNESIUM: CPT

## 2018-01-26 PROCEDURE — 80197 ASSAY OF TACROLIMUS: CPT

## 2018-01-26 PROCEDURE — 80048 BASIC METABOLIC PNL TOTAL CA: CPT

## 2018-01-26 PROCEDURE — 99217 PR OBSERVATION CARE DISCHARGE: CPT | Mod: ,,, | Performed by: HOSPITALIST

## 2018-01-26 PROCEDURE — G0378 HOSPITAL OBSERVATION PER HR: HCPCS

## 2018-01-26 PROCEDURE — 63600175 PHARM REV CODE 636 W HCPCS: Performed by: HOSPITALIST

## 2018-01-26 RX ORDER — TACROLIMUS 1 MG/1
5 CAPSULE ORAL EVERY 12 HOURS
Qty: 300 CAPSULE | Refills: 11 | Status: SHIPPED | OUTPATIENT
Start: 2018-01-26 | End: 2018-03-23 | Stop reason: DRUGHIGH

## 2018-01-26 RX ORDER — CINACALCET 30 MG/1
30 TABLET, FILM COATED ORAL
Qty: 30 TABLET | Refills: 11 | Status: ON HOLD | OUTPATIENT
Start: 2018-01-26 | End: 2018-01-01 | Stop reason: SDUPTHER

## 2018-01-26 RX ORDER — LABETALOL 200 MG/1
800 TABLET, FILM COATED ORAL EVERY 8 HOURS
Qty: 360 TABLET | Refills: 11 | Status: ON HOLD | OUTPATIENT
Start: 2018-01-26 | End: 2018-03-01

## 2018-01-26 RX ORDER — NIFEDIPINE 30 MG/1
60 TABLET, EXTENDED RELEASE ORAL DAILY
Qty: 60 TABLET | Refills: 11 | Status: ON HOLD | OUTPATIENT
Start: 2018-01-26 | End: 2018-03-01 | Stop reason: HOSPADM

## 2018-01-26 RX ORDER — FUROSEMIDE 20 MG/1
100 TABLET ORAL 2 TIMES DAILY
Qty: 300 TABLET | Refills: 11 | Status: SHIPPED | OUTPATIENT
Start: 2018-01-26 | End: 2018-03-14

## 2018-01-26 RX ADMIN — NIFEDIPINE 60 MG: 60 TABLET, FILM COATED, EXTENDED RELEASE ORAL at 12:01

## 2018-01-26 RX ADMIN — HYDRALAZINE HYDROCHLORIDE 25 MG: 25 TABLET, FILM COATED ORAL at 12:01

## 2018-01-26 RX ADMIN — ONDANSETRON 4 MG: 4 TABLET, FILM COATED ORAL at 12:01

## 2018-01-26 RX ADMIN — DICYCLOMINE HYDROCHLORIDE 20 MG: 20 TABLET ORAL at 12:01

## 2018-01-26 RX ADMIN — LABETALOL HYDROCHLORIDE 800 MG: 200 TABLET, FILM COATED ORAL at 02:01

## 2018-01-26 RX ADMIN — CEPHALEXIN 500 MG: 500 CAPSULE ORAL at 12:01

## 2018-01-26 RX ADMIN — LABETALOL HYDROCHLORIDE 800 MG: 200 TABLET, FILM COATED ORAL at 06:01

## 2018-01-26 RX ADMIN — HEPARIN SODIUM 5000 UNITS: 5000 INJECTION, SOLUTION INTRAVENOUS; SUBCUTANEOUS at 06:01

## 2018-01-26 RX ADMIN — METRONIDAZOLE 500 MG: 500 TABLET ORAL at 12:01

## 2018-01-26 RX ADMIN — CLONIDINE 1 PATCH: 0.2 PATCH TRANSDERMAL at 02:01

## 2018-01-26 RX ADMIN — FUROSEMIDE 100 MG: 40 TABLET ORAL at 12:01

## 2018-01-26 RX ADMIN — SODIUM CHLORIDE 350 ML: 0.9 INJECTION, SOLUTION INTRAVENOUS at 09:01

## 2018-01-26 RX ADMIN — OXYCODONE HYDROCHLORIDE 5 MG: 5 TABLET ORAL at 08:01

## 2018-01-26 RX ADMIN — ONDANSETRON 4 MG: 4 TABLET, FILM COATED ORAL at 06:01

## 2018-01-26 NOTE — NURSING
Pt dcd to home with family per family vehicle iv removed discharge instructions given pt verbalizes understanding of discharge vs stable at discharge no signs of distress noted

## 2018-01-26 NOTE — DISCHARGE SUMMARY
[facility] Hospital Medicine  Discharge Summary      Patient Name: Holly Patel  MRN: [id number]  Admission Date: 1/24/2018  Hospital Length of Stay: 0 days  Discharge Date and Time:  01/26/2018 5:26 PM  Attending Physician: No att. providers found   Discharging Provider: Jacky Escalera MD  Primary Care Provider: Stan Sosa MD  [facility] Medicine Team: [facility] HOSP MED  Jacky Escalera MD    HPI:   Ms. Patel is a 28yo woman with hx of liver transplant in '92 complicated by ESRD on home HD who presents from outpatient IR suite with complaint of asymptomatic hypertension. Of note, she has been having increased abdominal distension with ascites over the past few weeks. She is following closely with Hepatology, who are concerned that this may represent rejection and were planning on diagnostic/therapeutic paracentesis. She states she has been compliant with all medications, including her tacrolimus, although she does not know the doses of any of her medications and insists I look them up in the computer. She is otherwise asymptomatic; she denies headache, vision changes, chest pain, nausea, vomiting, abdominal pain, leg swelling, palpitations. Last episode of dialysis was yesterday. She has not changed her HD regimen recently. She does endorse some progressive shortness of breath which has worsened over past few weeks with her abdominal swelling. Of note, at last visit to Hepatology clinic, Bps were in 180s/100 and she was asymptomatic at that time.    * No surgery found *      Hospital Course:   Ms. Patel presented to the ED with BP in 200/120s from IR where she was scheduled to get outpatient paracentesis. K found to be 5.6. Nephrology consulted for dialysis and tacro level ordered. She was started on increased doses of home labetalol to 800mg tid and nifedipine 60 daily, also placed on hydralazine 25 bid and lasix 100mg bid; continuing home clonidine 0.2 patch. Blood  pressure well controlled. She was also >5kg over dry weight and had excess fluid removed during dialysis. Tacrolimus level elevated while in house to 13, likely due to poor patient compliance as outpatient. Held pm dose of tacro and will resume at 5mg bid tomorrow with close follow up.    Vitals:    01/26/18 1100 01/26/18 1115 01/26/18 1130 01/26/18 1220   BP: (!) 140/84 128/73 (!) 156/94 (!) 147/84   BP Location: Right arm Right arm Right arm    Patient Position: Lying Lying Lying Lying   Pulse: 78 81 79 87   Resp: 18 18 18 18   Temp:   98.6 °F (37 °C) 98 °F (36.7 °C)   TempSrc:   Oral Oral   SpO2:   100% 98%   Weight:       Height:           Physical Exam   Constitutional: She is oriented to person, place, and time. She appears well-developed and well-nourished. No distress.   HENT:   Head: Normocephalic and atraumatic.   Nose: Nose normal.   Eyes: EOM are normal. Pupils are equal, round, and reactive to light. No scleral icterus.   Neck: Normal range of motion. No JVD present. No tracheal deviation present.   Cardiovascular: Normal rate, regular rhythm and normal heart sounds.  Exam reveals no gallop and no friction rub.    No murmur heard.  Pulmonary/Chest: Effort normal. No respiratory distress.   Decreased breath sounds at bilateral bases   Abdominal: Soft. She exhibits no distension and no mass. There is no tenderness. No hernia.   Musculoskeletal: She exhibits no edema or deformity.   Left AVF with good thrill   Neurological: She is alert and oriented to person, place, and time.   Skin: Skin is warm and dry. No rash noted. She is not diaphoretic.   Psychiatric: She has a normal mood and affect. Her behavior is normal.     Consults:     * Uncontrolled hypertension    - appears to be somewhat of a chronic issue, as last BP in clinic a week ago was roughly in the same range  - potentially side effect of tacrolimus vs progressive fluid accumulation with inadequate HD as outpatient vs medication non-compliance  -  consult Nephrology for HD  - increase home labetalol to 800 tid and nifedipine to 60; continue home hydralazine 25 bid, clonidine patch 0.2/wk  - add lasix 100 bid as patient is still making urine          Urinary tract infection without hematuria    - completed course of keflex and flagyl for Trich/UTI          Chronic rejection of liver transplant    - will need further w/u as outpatient  - decrease tacro as above          ESRD on hemodialysis    - usually on home HD TTS  - consult Nephrology for dialysis, will need close f/u with her Nephrologist  - decreasing sevelamer to 30 given hypocalcemia          Liver replaced by transplant    - on tacro 7 bid as outpatient per Hepatology notes; at last admission goal 6-8  - elevated to 13 on this admission; held pm dose and decreased to 5mg bid  - will need close f/u with Hepatology on discharge given concern for rejection; will need paracentesis re-scheduled  - emphasized need for compliance and knowledge of her medications          Hyperkalemia-resolved as of 1/26/2018    - likely 2/2 volume overload with accumulation of K  - resolved s/p HD            Final Active Diagnoses:    Diagnosis Date Noted POA    PRINCIPAL PROBLEM:  Uncontrolled hypertension [I10] 01/24/2018 Yes    Urinary tract infection without hematuria [N39.0] 01/03/2018 Yes    Chronic rejection of liver transplant [T86.41] 03/22/2016 Yes    ESRD on hemodialysis [N18.6, Z99.2] 09/30/2015 Not Applicable     Chronic   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 [Z94.4] 09/10/2012 Not Applicable     Chronic      Problems Resolved During this Admission:    Diagnosis Date Noted Date Resolved POA    Hyperkalemia [E87.5] 01/24/2018 01/26/2018 Yes       Discharged Condition: good    Disposition: Home or Self Care    Follow Up:  Follow-up Information     IR In 1 week.    Why:  For paracentesis               Patient Instructions:     IR Paracentesis with Imaging   Standing Status: Future  Standing Exp. Date: 01/26/19    Order Specific Question Answer Comments   Reason for Exam: diagnostic, therapeutic paracentesis (previously scheduled for 1/24; unable to perform due to HTN)    Has the patient had a prior contrast or iodine reaction? No    Is the patient currently on any blood thinners like Aspirin, Coumadin, or Plavix? No    Is the patient on any Metaformin drug, such as Glugophage or Glucovance? No    May the Radiologist modify the order per protocol to meet the clinical needs of the patient? Yes      Diet renal     Activity as tolerated     Notify your health care provider if you experience any of the following:  temperature >100.4     Notify your health care provider if you experience any of the following:  persistent nausea and vomiting or diarrhea     Notify your health care provider if you experience any of the following:  persistent dizziness, light-headedness, or visual disturbances     Notify your health care provider if you experience any of the following:  increased confusion or weakness     Notify your health care provider if you experience any of the following:  difficulty breathing or increased cough         Significant Diagnostic Studies: Labs:   BMP:   Recent Labs  Lab 01/25/18 0424 01/26/18 0418   GLU 99 84    136   K 4.4 4.6    101   CO2 23 19*   BUN 30* 34*   CREATININE 9.5* 11.8*   CALCIUM 8.7 6.8*   MG 1.8 1.8   , CMP   Recent Labs  Lab 01/25/18 0424 01/26/18 0418    136   K 4.4 4.6    101   CO2 23 19*   GLU 99 84   BUN 30* 34*   CREATININE 9.5* 11.8*   CALCIUM 8.7 6.8*   ALBUMIN  --  2.2*   ANIONGAP 11 16   ESTGFRAFRICA 5.9* 4.5*   EGFRNONAA 5.1* 3.9*   , CBC   Recent Labs  Lab 01/25/18 0424 01/26/18 0418   WBC 4.10 3.09*   HGB 8.6* 9.2*   HCT 27.3* 28.3*   PLT 66* 81*   , INR   Lab Results   Component Value Date    INR 1.1 01/18/2018    INR 1.1 01/04/2018    INR 1.0 08/05/2017   , Lipid Panel   Lab Results   Component Value Date    CHOL 185 08/11/2008    HDL 59 08/11/2008     LDLCALC 108.2 08/11/2008    TRIG 89 08/11/2008    CHOLHDL 31.9 08/11/2008   , Troponin   Recent Labs  Lab 01/24/18  1208   TROPONINI 0.038*   , A1C:   Recent Labs  Lab 01/24/18  1208   HGBA1C 4.5    and All labs within the past 24 hours have been reviewed    Pending Diagnostic Studies:     None         Medications:  Reconciled Home Medications:   Discharge Medication List as of 1/26/2018  3:03 PM      START taking these medications    Details   furosemide (LASIX) 20 MG tablet Take 5 tablets (100 mg total) by mouth 2 (two) times daily., Starting Fri 1/26/2018, Until Sat 1/26/2019, Normal         CONTINUE these medications which have CHANGED    Details   cinacalcet (SENSIPAR) 30 MG Tab Take 1 tablet (30 mg total) by mouth daily with breakfast., Starting Fri 1/26/2018, Until Sat 1/26/2019, Normal      labetalol (NORMODYNE) 200 MG tablet Take 4 tablets (800 mg total) by mouth every 8 (eight) hours., Starting Fri 1/26/2018, Until Sat 1/26/2019, Normal      NIFEdipine (PROCARDIA-XL) 30 MG (OSM) 24 hr tablet Take 2 tablets (60 mg total) by mouth once daily., Starting Fri 1/26/2018, Until Sat 1/26/2019, Normal      tacrolimus (PROGRAF) 1 MG Cap Take 5 capsules (5 mg total) by mouth every 12 (twelve) hours., Starting Fri 1/26/2018, Until Sat 1/26/2019, Normal         CONTINUE these medications which have NOT CHANGED    Details   cloNIDine 0.2 mg/24 hr td ptwk (CATAPRES) 0.2 mg/24 hr Place 1 patch onto the skin every 7 days. Change every Friday, Starting Mon 1/22/2018, Until Tue 1/22/2019, Normal      dicyclomine (BENTYL) 20 mg tablet Take 1 tablet (20 mg total) by mouth 2 (two) times daily., Starting Sun 1/7/2018, Until Tue 2/6/2018, Print      famotidine (PEPCID) 20 MG tablet Take 1 tablet (20 mg total) by mouth 2 (two) times daily., Starting Sun 1/7/2018, Until Mon 1/7/2019, Print      food supplemt, lactose-reduced (ENSURE ACTIVE HIGH PROTEIN) Liqd Take 236 mLs by mouth 2 (two) times daily., Starting Wed 8/16/2017, Normal       hydrALAZINE (APRESOLINE) 25 MG tablet Take 1 tablet (25 mg total) by mouth every 12 (twelve) hours., Starting Thu 1/11/2018, Until Fri 1/11/2019, Normal      ondansetron (ZOFRAN) 4 MG tablet Take 1 tablet (4 mg total) by mouth every 6 (six) hours., Starting Sun 1/7/2018, Print      oxyCODONE (ROXICODONE) 5 MG immediate release tablet Take 1 tablet (5 mg total) by mouth every 4 (four) hours as needed for Pain., Starting Thu 1/18/2018, Print      predniSONE (DELTASONE) 1 MG tablet Take 1 mg by mouth every other day., Until Discontinued, Historical Med      triamcinolone acetonide 0.1% (KENALOG) 0.1 % ointment AAA on arms, legs, and neck bid x 1-2 wks then prn flares only, Normal         STOP taking these medications       cephALEXin (KEFLEX) 500 MG capsule Comments:   Reason for Stopping:         metroNIDAZOLE (FLAGYL) 500 MG tablet Comments:   Reason for Stopping:               Indwelling Lines/Drains at time of discharge:   Lines/Drains/Airways     Drain                 Hemodialysis AV Fistula Left forearm -- days                Time spent on the discharge of patient: 35 minutes  Patient was seen and examined on the date of discharge and determined to be suitable for discharge.         Jacky Escalera MD  Department of Hospital Medicine  [facility]

## 2018-01-26 NOTE — MEDICAL/APP STUDENT
Progress Note  Hospital Medicine    Patient Name: Holly Patel  YOB: 1990  Patient Class: IP  Admit Date: 1/24/2018                     LOS: 0  Attending Physician: Jacky Escalera MD  Hospital Team: [facility] Medicine Team ILIR Cano, 3rd Year Medical Student     SUBJECTIVE:     Reason for Admission:  Hyperkalemia     HPI:  27 y.o. female with ESRD on hemodialysis at home (4x week) via PAL AVF, liver transplant(1992) with chronic rejection, renovascular HTN, immunosuppression, and thrombocytopenia who presented to the ED today complaining of persistent abdominal distension. She was scheduled for paracentesis this morning, but was sent to the ER after being found to be hypertensive. Pt endorses compliance with her antihypertensive medications but reports her blood pressure is frequently elevated. Complains of abdominal pain especially at night, dependent BLE edema and has had some intermittant SOB. She was last dialyzed yesterday. No chest pain, palpitations, fever, chills, cough, nausea/vomiting, diarrhea, dysuria, or headache/change in vision. Dry weight approximately 54-56kg.     Hospital Course:  1/24: presented to the ED with BP in 200/120s from IR where she was scheduled to get outpatient paracentesis. K found to be 5.6. Tacro level ordered. Received 3hrs dialysis with net UF of 2.5L. Tolerated well.      1/25: K+ down to 4.4 from 5.6, BP still elevated (most recent 171/102). Receiving dialysis again, probably not until tomorrow. Tacrolimus level pending    1/26: Patient dialyzed for 3.5 hours with fluid removal of 2 liters. Tolerated well with stable vital signs.      Interval history:   No acute events overnight. Denies chest pain, SOB, fever/chills, light headedness.     ROS:  Constitutional: Negative for fatigue.   Respiratory: Negative for cough, shortness of breath and wheezing.   Cardiovascular: Negative for chest pain and palpitations.   Gastrointestinal: Some  abdominal discomfort. Negative for nausea/vomiting.   Genitourinary: Negative for dysuria, urgency, frequency and hematuria.   Skin: Negative for color change and rash.   Psychiatric/Behavioral: Negative for confusion.    OBJECTIVE:     Vital Signs Range (Last 24H):  Temp:  [96.4 °F (35.8 °C)-98.3 °F (36.8 °C)]   Pulse:  [82-90]   Resp:  [16-20]   BP: (126-165)/()   SpO2:  [96 %-100 %] Body mass index is 21.72 kg/m².  Wt Readings from Last 1 Encounters:   01/24/18 1700 59.2 kg (130 lb 8.2 oz)   01/24/18 1130 61.2 kg (135 lb)       I & O (Last 24H):  Intake/Output Summary (Last 24 hours) at 01/26/18 0835  Last data filed at 01/26/18 0600   Gross per 24 hour   Intake              600 ml   Output                0 ml   Net              600 ml     Physical Exam:  Constitutional: chronically ill young black female. No distress.  Head: Normocephalic and atraumatic.   Eyes: Pale conjunctiva.    Cardiovascular: Systolic murmur.  Pulmonary/Chest: Breath sounds normal, no added or adventitious sounds.   Abdomin: Soft. Slight distension. No guarding or rebound tenderness.  Musculoskeletal: No edema. Left AVF with good thrill.  Neurological: She is alert and oriented to person, place, and time. 5/5 strength, sensation normal  Skin: Warm and dry. Not diaphoretic.  Psychiatric: She has a normal mood and affect    Diagnostic Results:  Lab Results   Component Value Date    WBC 3.09 (L) 01/26/2018    HGB 9.2 (L) 01/26/2018    HCT 28.3 (L) 01/26/2018    MCV 80 (L) 01/26/2018    PLT 81 (L) 01/26/2018       Recent Labs  Lab 01/26/18  0418   GLU 84      K 4.6      CO2 19*   BUN 34*   CREATININE 11.8*   CALCIUM 6.8*   MG 1.8     Lab Results   Component Value Date    INR 1.1 01/18/2018    INR 1.1 01/04/2018    INR 1.0 08/05/2017     Lab Results   Component Value Date    HGBA1C 4.5 01/24/2018     Recent Labs      01/24/18   2147   POCTGLUCOSE  112*     Tacrolimus level:   Component      Latest Ref Rng & Units 1/25/2018  1/24/2018 1/22/2018 1/17/2018                Tacrolimus Lvl      5.0 - 15.0 ng/mL 7.8 6.3 2.0 (L) 33.8 (HH)     Component      Latest Ref Rng & Units 1/5/2018 1/4/2018 12/15/2017               Tacrolimus Lvl      5.0 - 15.0 ng/mL 6.0 5.7 <1.5 (L)       ASSESSMENT/PLAN:   27 y.o. female with ESRD on hemodialysis at home (4x week) via PAL AVF, liver transplant(1992) with chronic rejection, renovascular HTN, immunosuppression, and thrombocytopenia who presented to the ED complaining of persistent abdominal distension.    Active Hospital Problems    Diagnosis  POA    Uncontrolled hypertension [I10]  Yes    Urinary tract infection without hematuria [N39.0]  Yes    Chronic rejection of liver transplant [T86.41]  Yes    ESRD on hemodialysis [N18.6, Z99.2]  Not Applicable     Chronic   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 [Z94.4]  Not Applicable     Chronic     hemangioendothelioma s/p LTx (1992)        Resolved Hospital Problems    Hyperkalemia [E87.5] 1/25/18 POA     Problems Addressed Today:    Uncontrolled hypertension  - appears to be somewhat of a chronic issue, as last BP in clinic a week ago was roughly in the same range  - potentially side effect of tacrolimus vs progressive fluid accumulation with inadequate HD as outpatient vs medication non-compliance  - consulting Nephrology for HD (again today 1/26)  - Patient does not know home medications.  - continue home labetalol 600 tid, hydralazine 25 bid, clonidine patch 0.2/wk, and nifedipine 30  - added lasix 100 bid as patient is still making urine (1/25)  - most recent /84    Chronic rejection of liver transplant  - will need further w/u as outpatient  - continue tacro at 7mg bid as per last Hepatology note  - monitoring tacro level    Liver replaced by transplant  - see above  - continue tacro; at last admission goal 6-8, at goal  - will need close f/u with Hepatology on discharge given concern for rejection; will need paracentesis re-scheduled?    ESRD on  hemodialysis  - Home Dialysis Sun, Mon, Wed, Fri  under care of Dr. Bass at [facility].  - need to weigh patient  - HD today (1/26) with further fluid removal  - continue home calcitriol 1 mcg bid, sensipar 90 mg, renvela pwdr 2.4g TID        Urinary tract infection without hematuria  - continue outpatient keflex and flagyl for Trich    Signing Student:  Derek Cano

## 2018-01-26 NOTE — PLAN OF CARE
Problem: Patient Care Overview  Goal: Plan of Care Review  Outcome: Outcome(s) achieved Date Met: 01/26/18  Dialysis completed. Needles removed from left forearm fistula with pressure held to sites for 5 minutes each with hemostasis achieved. Gauze and tape to sites. Patient dialyzed for 3.5 hours with fluid removal of 2 liters. Tolerated well with stable vital signs.

## 2018-01-26 NOTE — PROGRESS NOTES
Patient received from the floor with report from Rebecca Null. Maintenance dialysis began per orders via 15 buttonhole needle to venous end of left forearm fistula and sharp 15 gauge needle to arterial end of fistula.

## 2018-01-26 NOTE — ASSESSMENT & PLAN NOTE
- on tacro 7 bid as outpatient per Hepatology notes; at last admission goal 6-8  - elevated to 13 on this admission; held pm dose and decreased to 5mg bid  - will need close f/u with Hepatology on discharge given concern for rejection; will need paracentesis re-scheduled  - emphasized need for compliance and knowledge of her medications

## 2018-01-26 NOTE — ASSESSMENT & PLAN NOTE
- usually on home HD TTS  - consult Nephrology for dialysis, will need close f/u with her Nephrologist  - decreasing sevelamer to 30 given hypocalcemia

## 2018-01-29 ENCOUNTER — HOSPITAL ENCOUNTER (EMERGENCY)
Facility: HOSPITAL | Age: 28
Discharge: HOME | End: 2018-01-29
Attending: EMERGENCY MEDICINE
Payer: MEDICARE

## 2018-01-29 ENCOUNTER — SURGERY (OUTPATIENT)
Age: 28
End: 2018-01-29

## 2018-01-29 VITALS
BODY MASS INDEX: 20.99 KG/M2 | WEIGHT: 126 LBS | DIASTOLIC BLOOD PRESSURE: 109 MMHG | RESPIRATION RATE: 20 BRPM | HEART RATE: 87 BPM | HEIGHT: 65 IN | SYSTOLIC BLOOD PRESSURE: 158 MMHG | OXYGEN SATURATION: 100 % | TEMPERATURE: 97 F

## 2018-01-29 DIAGNOSIS — R93.89 ABNORMAL CHEST X-RAY: ICD-10-CM

## 2018-01-29 DIAGNOSIS — Z94.4 LIVER REPLACED BY TRANSPLANT: Chronic | ICD-10-CM

## 2018-01-29 DIAGNOSIS — N18.6 END STAGE RENAL DISEASE: ICD-10-CM

## 2018-01-29 DIAGNOSIS — N18.6 ESRD ON HEMODIALYSIS: Chronic | ICD-10-CM

## 2018-01-29 DIAGNOSIS — D84.9 IMMUNOSUPPRESSION: Chronic | ICD-10-CM

## 2018-01-29 DIAGNOSIS — Z86.79 HISTORY OF UNCONTROLLED HYPERTENSION: ICD-10-CM

## 2018-01-29 DIAGNOSIS — I10 ASYMPTOMATIC HYPERTENSION: ICD-10-CM

## 2018-01-29 DIAGNOSIS — I10 HYPERTENSION: ICD-10-CM

## 2018-01-29 DIAGNOSIS — Z99.2 ESRD ON HEMODIALYSIS: Chronic | ICD-10-CM

## 2018-01-29 DIAGNOSIS — I10 ELEVATED BLOOD PRESSURE READING WITH DIAGNOSIS OF HYPERTENSION: Primary | ICD-10-CM

## 2018-01-29 DIAGNOSIS — Z94.4 HISTORY OF LIVER TRANSPLANT: ICD-10-CM

## 2018-01-29 LAB
ALBUMIN SERPL BCP-MCNC: 2.3 G/DL
ALP SERPL-CCNC: 572 U/L
ALT SERPL W/O P-5'-P-CCNC: 21 U/L
ANION GAP SERPL CALC-SCNC: 12 MMOL/L
AST SERPL-CCNC: 36 U/L
B-HCG UR QL: NEGATIVE
BACTERIA #/AREA URNS AUTO: ABNORMAL /HPF
BASOPHILS # BLD AUTO: 0.01 K/UL
BASOPHILS NFR BLD: 0.3 %
BILIRUB SERPL-MCNC: 0.3 MG/DL
BILIRUB UR QL STRIP: NEGATIVE
BNP SERPL-MCNC: 715 PG/ML
BUN SERPL-MCNC: 32 MG/DL
CALCIUM SERPL-MCNC: 8.1 MG/DL
CHLORIDE SERPL-SCNC: 103 MMOL/L
CLARITY UR REFRACT.AUTO: ABNORMAL
CO2 SERPL-SCNC: 22 MMOL/L
COLOR UR AUTO: YELLOW
CREAT SERPL-MCNC: 11.9 MG/DL
CTP QC/QA: YES
DIFFERENTIAL METHOD: ABNORMAL
EOSINOPHIL # BLD AUTO: 0.4 K/UL
EOSINOPHIL NFR BLD: 11.1 %
ERYTHROCYTE [DISTWIDTH] IN BLOOD BY AUTOMATED COUNT: 17 %
EST. GFR  (AFRICAN AMERICAN): 4.5 ML/MIN/1.73 M^2
EST. GFR  (NON AFRICAN AMERICAN): 3.9 ML/MIN/1.73 M^2
GLUCOSE SERPL-MCNC: 163 MG/DL
GLUCOSE UR QL STRIP: ABNORMAL
HCT VFR BLD AUTO: 27.9 %
HGB BLD-MCNC: 8.8 G/DL
HGB UR QL STRIP: NEGATIVE
HYALINE CASTS UR QL AUTO: 0 /LPF
IMM GRANULOCYTES # BLD AUTO: 0.03 K/UL
IMM GRANULOCYTES NFR BLD AUTO: 0.9 %
KETONES UR QL STRIP: NEGATIVE
LEUKOCYTE ESTERASE UR QL STRIP: ABNORMAL
LYMPHOCYTES # BLD AUTO: 0.8 K/UL
LYMPHOCYTES NFR BLD: 22.8 %
MCH RBC QN AUTO: 25.8 PG
MCHC RBC AUTO-ENTMCNC: 31.5 G/DL
MCV RBC AUTO: 82 FL
MICROSCOPIC COMMENT: ABNORMAL
MONOCYTES # BLD AUTO: 0.3 K/UL
MONOCYTES NFR BLD: 7.8 %
NEUTROPHILS # BLD AUTO: 1.9 K/UL
NEUTROPHILS NFR BLD: 57.1 %
NITRITE UR QL STRIP: NEGATIVE
NRBC BLD-RTO: 0 /100 WBC
PH UR STRIP: 8 [PH] (ref 5–8)
PLATELET # BLD AUTO: 72 K/UL
PMV BLD AUTO: 10.2 FL
POTASSIUM SERPL-SCNC: 4.8 MMOL/L
PROT SERPL-MCNC: 6.6 G/DL
PROT UR QL STRIP: ABNORMAL
RBC # BLD AUTO: 3.41 M/UL
RBC #/AREA URNS AUTO: 10 /HPF (ref 0–4)
SODIUM SERPL-SCNC: 137 MMOL/L
SP GR UR STRIP: 1.01 (ref 1–1.03)
SQUAMOUS #/AREA URNS AUTO: 58 /HPF
TROPONIN I SERPL DL<=0.01 NG/ML-MCNC: 0.02 NG/ML
URN SPEC COLLECT METH UR: ABNORMAL
UROBILINOGEN UR STRIP-ACNC: NEGATIVE EU/DL
WBC # BLD AUTO: 3.34 K/UL
WBC #/AREA URNS AUTO: 23 /HPF (ref 0–5)

## 2018-01-29 PROCEDURE — 63600175 PHARM REV CODE 636 W HCPCS: Performed by: EMERGENCY MEDICINE

## 2018-01-29 PROCEDURE — 87086 URINE CULTURE/COLONY COUNT: CPT

## 2018-01-29 PROCEDURE — 87186 SC STD MICRODIL/AGAR DIL: CPT

## 2018-01-29 PROCEDURE — 87077 CULTURE AEROBIC IDENTIFY: CPT

## 2018-01-29 PROCEDURE — 81025 URINE PREGNANCY TEST: CPT | Performed by: EMERGENCY MEDICINE

## 2018-01-29 PROCEDURE — 96374 THER/PROPH/DIAG INJ IV PUSH: CPT

## 2018-01-29 PROCEDURE — 99284 EMERGENCY DEPT VISIT MOD MDM: CPT | Mod: 25

## 2018-01-29 PROCEDURE — 96375 TX/PRO/DX INJ NEW DRUG ADDON: CPT

## 2018-01-29 PROCEDURE — 87088 URINE BACTERIA CULTURE: CPT

## 2018-01-29 PROCEDURE — 25000003 PHARM REV CODE 250: Performed by: EMERGENCY MEDICINE

## 2018-01-29 PROCEDURE — 84484 ASSAY OF TROPONIN QUANT: CPT

## 2018-01-29 PROCEDURE — 80053 COMPREHEN METABOLIC PANEL: CPT

## 2018-01-29 PROCEDURE — 83880 ASSAY OF NATRIURETIC PEPTIDE: CPT

## 2018-01-29 PROCEDURE — 93005 ELECTROCARDIOGRAM TRACING: CPT

## 2018-01-29 PROCEDURE — 85025 COMPLETE CBC W/AUTO DIFF WBC: CPT

## 2018-01-29 PROCEDURE — 81001 URINALYSIS AUTO W/SCOPE: CPT

## 2018-01-29 PROCEDURE — 93010 ELECTROCARDIOGRAM REPORT: CPT | Mod: ,,, | Performed by: INTERNAL MEDICINE

## 2018-01-29 PROCEDURE — 99285 EMERGENCY DEPT VISIT HI MDM: CPT | Mod: GC,,, | Performed by: EMERGENCY MEDICINE

## 2018-01-29 RX ORDER — LABETALOL HYDROCHLORIDE 5 MG/ML
20 INJECTION, SOLUTION INTRAVENOUS
Status: COMPLETED | OUTPATIENT
Start: 2018-01-29 | End: 2018-01-29

## 2018-01-29 RX ORDER — HYDRALAZINE HYDROCHLORIDE 20 MG/ML
10 INJECTION INTRAMUSCULAR; INTRAVENOUS
Status: COMPLETED | OUTPATIENT
Start: 2018-01-29 | End: 2018-01-29

## 2018-01-29 RX ADMIN — LABETALOL HYDROCHLORIDE 20 MG: 5 INJECTION, SOLUTION INTRAVENOUS at 12:01

## 2018-01-29 RX ADMIN — HYDRALAZINE HYDROCHLORIDE 10 MG: 20 INJECTION INTRAMUSCULAR; INTRAVENOUS at 02:01

## 2018-01-29 NOTE — ED PROVIDER NOTES
Encounter Date: 1/29/2018       History     Chief Complaint   Patient presents with    Hypertension     sent from 2 floor was suppose to have liver bx and cancelled due to hypertension has iv in L forearm and fluids turned off     28 y/o F with h/o hemangioendothelioma s/p LTx (1992), ESRD, FANNIE, persistently uncontrolled HTN sent from liver bx facility 2/2 elevated BP. Pt was found to have BP 200s SBP there, preventing the doctors from performing her outpt liver bx. She denies any sx- no CP, SOB, palps, pre-syncope, dizziness / lightheadedness, HA, n/v, focal neuro or visual changes, other complaints. She was recently admitted and received a paracentesis- and was found to have a similarly elevated asymptomatic HTN- at which point her doctors increased her BP med regimen. Reports full compliance with her meds which include 4-5 HTN meds, including a clonidine patch which she just replaced this AM. Last took her meds this AM as well and states her diet has not changed. Last HD was yest and uncomplicated.            Review of patient's allergies indicates:   Allergen Reactions    Chloral hydrate      Other reaction(s): Hallucinations  Other reaction(s): Hives    Hydrocodone Other (See Comments)     Mental status changes     Past Medical History:   Diagnosis Date    Anemia in ESRD (end-stage renal disease) 10/12/2015    Chronic rejection of liver transplant 3/22/2016    ESRD on hemodialysis 9/30/2015    History of splenomegaly 4/12/2016    Immunosuppressed 8/5/2017    Iron deficiency anemia secondary to inadequate dietary iron intake 8/16/2017    She receives IV iron periodically at the Dialysis Center.   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)    Moderate protein-calorie malnutrition 8/16/2017    MRSA bacteremia 8/6/2017    Prophylactic immunotherapy 8/4/2014    Renovascular hypertension 10/2/2015    Secondary hyperparathyroidism 8/5/2017    Thrombocytopenia  2016     Past Surgical History:   Procedure Laterality Date     SECTION      2     KIDNEY TRANSPLANT      LIVER BIOPSY      LIVER TRANSPLANT  1992    TUBAL LIGATION       Family History   Problem Relation Age of Onset    Hypertension Mother     Hypertension Father     Melanoma Neg Hx      Social History   Substance Use Topics    Smoking status: Never Smoker    Smokeless tobacco: Never Used    Alcohol use No     Review of Systems   Constitutional: Negative for chills, diaphoresis, fatigue and fever.   HENT: Negative for congestion, rhinorrhea, sinus pressure and sore throat.    Eyes: Negative for visual disturbance.   Respiratory: Negative for cough and shortness of breath.    Cardiovascular: Negative for chest pain, palpitations and leg swelling.   Gastrointestinal: Negative for abdominal pain, blood in stool, constipation, diarrhea, nausea and vomiting.   Genitourinary: Negative for difficulty urinating, dysuria, flank pain, frequency, hematuria, pelvic pain, urgency, vaginal bleeding and vaginal discharge.   Musculoskeletal: Negative for back pain and neck pain.   Skin: Negative for rash.   Neurological: Negative for dizziness, seizures, syncope, facial asymmetry, speech difficulty, weakness, light-headedness, numbness and headaches.   Hematological: Does not bruise/bleed easily.   Psychiatric/Behavioral: Negative for behavioral problems and confusion.       Physical Exam     Initial Vitals [18 1013]   BP Pulse Resp Temp SpO2   (!) 205/132 78 20 97.4 °F (36.3 °C) 100 %      MAP       156.33         Physical Exam    Nursing note and vitals reviewed.  Constitutional: She appears well-developed and well-nourished. She is not diaphoretic. No distress.   Well appearing comfortable young BF in NAD with family at bedside   HENT:   Head: Normocephalic and atraumatic.   Mouth/Throat: Oropharynx is clear and moist. No oropharyngeal exudate.   Eyes: EOM are normal. Pupils are equal, round,  and reactive to light. No scleral icterus.   Neck: Normal range of motion. Neck supple. No tracheal deviation present. No JVD present.   Cardiovascular: Normal rate, regular rhythm and intact distal pulses. Exam reveals no gallop and no friction rub.    No murmur heard.  Pulmonary/Chest: Breath sounds normal. No stridor. She has no wheezes. She has no rhonchi. She has no rales.   Abdominal: Soft. There is no tenderness.   Musculoskeletal: She exhibits no edema or tenderness.   Neurological: She is alert and oriented to person, place, and time.   Skin: Skin is warm and dry. No rash noted.   Psychiatric: She has a normal mood and affect. Thought content normal.       Remy Dalton MD I PGY-4 EM  1/29/2018 7:38 PM    ED Course   Procedures  Labs Reviewed   CBC W/ AUTO DIFFERENTIAL - Abnormal; Notable for the following:        Result Value    WBC 3.34 (*)     RBC 3.41 (*)     Hemoglobin 8.8 (*)     Hematocrit 27.9 (*)     MCH 25.8 (*)     MCHC 31.5 (*)     RDW 17.0 (*)     Platelets 72 (*)     Immature Granulocytes 0.9 (*)     Lymph # 0.8 (*)     Eosinophil% 11.1 (*)     All other components within normal limits   COMPREHENSIVE METABOLIC PANEL - Abnormal; Notable for the following:     CO2 22 (*)     Glucose 163 (*)     BUN, Bld 32 (*)     Creatinine 11.9 (*)     Calcium 8.1 (*)     Albumin 2.3 (*)     Alkaline Phosphatase 572 (*)     eGFR if  4.5 (*)     eGFR if non  3.9 (*)     All other components within normal limits   B-TYPE NATRIURETIC PEPTIDE - Abnormal; Notable for the following:      (*)     All other components within normal limits   URINALYSIS, REFLEX TO URINE CULTURE - Abnormal; Notable for the following:     Appearance, UA Cloudy (*)     Protein, UA 2+ (*)     Glucose, UA 2+ (*)     Leukocytes, UA 2+ (*)     All other components within normal limits    Narrative:     Preferred Collection Type->Urine, Catheterized   URINALYSIS MICROSCOPIC - Abnormal; Notable for the  following:     RBC, UA 10 (*)     WBC, UA 23 (*)     All other components within normal limits    Narrative:     Preferred Collection Type->Urine, Catheterized   CULTURE, URINE   TROPONIN I   POCT URINE PREGNANCY     EKG Readings: (Independently Interpreted)   Initial Reading: No STEMI. Previous EKG: Compared with most recent EKG Rhythm: Normal Sinus Rhythm. Heart Rate: 78. Ectopy: No Ectopy. Axis: Normal. Clinical Impression: Normal Sinus Rhythm       X-Rays:   Independently Interpreted Readings:   Other Readings:  +small area of R sided basilar opacification, new from prior; L side improved from prior; no e/o PTX or other acute abnlty          APC / Resident Notes:   HO-IV MDM: Pt presented as above- with asx HTN in setting of recently increasing BP meds, preventing her from undergoing an outpt liver bx today. We attempted a trial of labetalol here without improvemt, so decided to broaden w/u to include EKG, labs were unremarkable besides her baseline BUN/Cr. Her CXR demonstrated a new opacification in her R lower lung area, however with no URI or respiratory sx, no fever and no leukocytosis, we cannot explain that this is likely a PNA and she was advised to monitor her sx and f/u with her doctors about this.   Pt remained completely asx throughout her ED stay. We gave her a dose of hydralazine here, which she also takes at home, and her BP improved to 150-160s SBP on serial measuremts without subsequent worsening. We discussed with pt- who in her chart has listed hx of med non-compliance- the importance of continuing to comply with her medicines and scheduled tx per her doctors. We feel she is safe for discharge, and she was advised to call her doctors today or tomorrow AM to f/u on her HTN and reschedule her bx. Shared decision making was made with family at bedside, who were instructed to monitor pt for any si/sx that could be c/w ACS, CVA, or worsening renal dz. Discharged with strict return precautions.  Case  and plan d/w staff Dr. Vitale.  Remy Dalton MD I PGY-4 EM  1/29/2018 7:39 PM           Attending Attestation:   Physician Attestation Statement for Resident:  As the supervising MD   Physician Attestation Statement: I have personally seen and examined this patient.   I agree with the above history. -:   As the supervising MD I agree with the above PE.    As the supervising MD I agree with the above treatment, course, plan, and disposition.                    ED Course      Clinical Impression:   The primary encounter diagnosis was Elevated blood pressure reading with diagnosis of hypertension. Diagnoses of Hypertension, History of liver transplant, History of uncontrolled hypertension, Asymptomatic hypertension, End stage renal disease, and Abnormal chest x-ray were also pertinent to this visit.                           Remy Dalton MD  Resident  01/29/18 1951

## 2018-01-29 NOTE — ED TRIAGE NOTES
Pt arrives to ED from the 2nd floor with CC of HTN. Pt stated that she was upstairs to get a liver biopsy and that the would not do the procedure because her blood pressure was too high. Pt states that her BP normally runs high. Pt denies NVD. Pt is alert and oriented and in no acute distress at this time.

## 2018-01-29 NOTE — DISCHARGE INSTRUCTIONS
Other Future Appointments  Date Time Provider Department Center   1/29/2018 7:35 AM LAB, APPOINTMENT [place] [facility] VNP [facility]   1/29/2018 8:00 AM [facility] [id number] RAD IR [facility]   2/27/2018 3:20 PM Stan Sosa MD [facility] IM [facility]   3/7/2018 1:40 PM Stan Sosa MD [facility] IM [facility]

## 2018-01-29 NOTE — ED NOTES
APPEARANCE: awake and alert in NAD.  SKIN: warm, dry and intact. No breakdown or bruising.  MUSCULOSKELETAL: Patient moving all extremities spontaneously, no obvious swelling or deformities noted. Ambulates independently. Dialysis fistula noted on right upper arm.   RESPIRATORY: no shortness of breath. RR 20 equal and unlabored.   CARDIAC: heart tones normal. Regular rate and rhythm; 2+ distal pulses; no peripheral edema  ABDOMEN: S/ND/NT, normoactive bowel sounds present in all four quadrants. Normal stool pattern.  : voids spontaneously without difficulty.  Neurologic: AAO x 4; follows commands equal strength in all extremities;No numbness and tingling.

## 2018-01-29 NOTE — PLAN OF CARE
01/29/18 0722   Final Note   Assessment Type Final Discharge Note   Discharge Disposition Home   Hospital Follow Up  Appt(s) scheduled? Yes   Did you assess the readiness or willingness of the family or caregiver to support self management of care? Yes   Right Care Referral Info   Post Acute Recommendation No Care     Plan is to discharge home with continuing home dialysis.    Future Appointments  Date Time Provider Department Center   1/29/2018 7:35 AM LAB, APPOINTMENT [facility] LAB VNP [facility]   1/29/2018 8:00 AM [facility] [id number] [facility] RAD IR [facility]   2/27/2018 3:20 PM Stan Sosa MD [facility]   3/7/2018 1:40 PM Stan Sosa MD [facility]ado [facility]

## 2018-01-30 ENCOUNTER — TELEPHONE (OUTPATIENT)
Dept: TRANSPLANT | Facility: CLINIC | Age: 28
End: 2018-01-30

## 2018-01-30 NOTE — TELEPHONE ENCOUNTER
----- Message from Aga Carr sent at 1/30/2018 11:46 AM CST -----  Contact: Pt  Pt would like to reschedule her liver biopsy that she missed    Pt contact number [phone]    Thanks

## 2018-01-30 NOTE — TELEPHONE ENCOUNTER
Spoke with pt, discussed that we are going to have a hard time getting the biopsy and the paracentesis while her BP is so out of control.  Pt states she will be compliant with medications and track her BP twice a day while we work on getting things scheduled.  Will also discuss how to proceed with Dr. Pinedo. Pt agreed with plan.

## 2018-01-31 ENCOUNTER — TELEPHONE (OUTPATIENT)
Dept: TRANSPLANT | Facility: CLINIC | Age: 28
End: 2018-01-31

## 2018-01-31 LAB — BACTERIA UR CULT: NORMAL

## 2018-02-05 ENCOUNTER — LAB VISIT (OUTPATIENT)
Dept: LAB | Facility: HOSPITAL | Age: 28
End: 2018-02-05
Attending: INTERNAL MEDICINE
Payer: MEDICARE

## 2018-02-05 DIAGNOSIS — Z94.4 LIVER TRANSPLANTED: ICD-10-CM

## 2018-02-05 LAB
ALBUMIN SERPL BCP-MCNC: 2.4 G/DL
ALP SERPL-CCNC: 617 U/L
ALT SERPL W/O P-5'-P-CCNC: 32 U/L
ANION GAP SERPL CALC-SCNC: 13 MMOL/L
AST SERPL-CCNC: 35 U/L
BASOPHILS # BLD AUTO: 0.02 K/UL
BASOPHILS NFR BLD: 0.5 %
BILIRUB SERPL-MCNC: 0.6 MG/DL
BUN SERPL-MCNC: 62 MG/DL
CALCIUM SERPL-MCNC: 8.8 MG/DL
CHLORIDE SERPL-SCNC: 100 MMOL/L
CO2 SERPL-SCNC: 25 MMOL/L
CREAT SERPL-MCNC: 13.9 MG/DL
DIFFERENTIAL METHOD: ABNORMAL
EOSINOPHIL # BLD AUTO: 0.4 K/UL
EOSINOPHIL NFR BLD: 9.1 %
ERYTHROCYTE [DISTWIDTH] IN BLOOD BY AUTOMATED COUNT: 16.7 %
EST. GFR  (AFRICAN AMERICAN): 3.7 ML/MIN/1.73 M^2
EST. GFR  (NON AFRICAN AMERICAN): 3.2 ML/MIN/1.73 M^2
GLUCOSE SERPL-MCNC: 90 MG/DL
HCT VFR BLD AUTO: 30.8 %
HGB BLD-MCNC: 10 G/DL
IMM GRANULOCYTES # BLD AUTO: 0.01 K/UL
IMM GRANULOCYTES NFR BLD AUTO: 0.2 %
LYMPHOCYTES # BLD AUTO: 0.7 K/UL
LYMPHOCYTES NFR BLD: 17.5 %
MCH RBC QN AUTO: 25.4 PG
MCHC RBC AUTO-ENTMCNC: 32.5 G/DL
MCV RBC AUTO: 78 FL
MONOCYTES # BLD AUTO: 0.3 K/UL
MONOCYTES NFR BLD: 8.4 %
NEUTROPHILS # BLD AUTO: 2.6 K/UL
NEUTROPHILS NFR BLD: 64.3 %
NRBC BLD-RTO: 0 /100 WBC
PLATELET # BLD AUTO: 65 K/UL
PMV BLD AUTO: ABNORMAL FL
POTASSIUM SERPL-SCNC: 4.9 MMOL/L
PROT SERPL-MCNC: 7 G/DL
RBC # BLD AUTO: 3.93 M/UL
SODIUM SERPL-SCNC: 138 MMOL/L
TACROLIMUS BLD-MCNC: <1.5 NG/ML
WBC # BLD AUTO: 4.06 K/UL

## 2018-02-05 PROCEDURE — 36415 COLL VENOUS BLD VENIPUNCTURE: CPT | Mod: PO

## 2018-02-05 PROCEDURE — 85025 COMPLETE CBC W/AUTO DIFF WBC: CPT

## 2018-02-05 PROCEDURE — 80197 ASSAY OF TACROLIMUS: CPT

## 2018-02-05 PROCEDURE — 80053 COMPREHEN METABOLIC PANEL: CPT

## 2018-02-09 ENCOUNTER — TELEPHONE (OUTPATIENT)
Dept: TRANSPLANT | Facility: CLINIC | Age: 28
End: 2018-02-09

## 2018-02-09 NOTE — PROGRESS NOTES
Subjective:       Patient ID: Holly Patel is a 27 y.o. Black or  female who presents for follow-up evaluation of ESRD    HPI    Ms. Patel is a 27 year old woman with past medical history of liver transplant (1992 for hemangioendothelioma), malignant hypertension presenting for follow up of ESRD on home hemo (NxStage).  Patient initiated on hemodialysis October 2015, trained on home hemodialysis February 2016, performing at home without difficulty (with main assistance from her sister).  Patient recently seen by Liver Transplant, expressed concern for med adherence, has follow up scheduled.  Patient reports blood pressures have been variable.  She otherwise denies any fever, chest pain, shortness of breath, abdominal pain, diarrhea, dysuria/hematuria.    Review of Systems   Constitutional: Negative for appetite change, fatigue and fever.   Respiratory: Negative for chest tightness and shortness of breath.    Cardiovascular: Negative for chest pain and leg swelling.   Gastrointestinal: Negative for abdominal pain, constipation, diarrhea, nausea and vomiting.   Genitourinary: Negative for difficulty urinating, dysuria, flank pain, frequency, hematuria and urgency.   Musculoskeletal: Negative for arthralgias, joint swelling and myalgias.   Skin: Negative for rash and wound.   Neurological: Negative for dizziness, weakness and light-headedness.   All other systems reviewed and are negative.      Objective:      Physical Exam   Constitutional: She appears well-developed and well-nourished.   Cardiovascular: Normal rate, regular rhythm and normal heart sounds.  Exam reveals no gallop and no friction rub.    No murmur heard.  Pulmonary/Chest: Effort normal and breath sounds normal. No respiratory distress. She has no wheezes. She has no rales.   Abdominal: Soft. Bowel sounds are normal. There is no tenderness.   Musculoskeletal: She exhibits no edema.   Neurological: She is alert.   Skin: Skin is  warm and dry. No rash noted. No erythema.   Psychiatric: She has a normal mood and affect.   Vitals reviewed.    Access: L AVF w/ good thrill/bruit    Non-OCF labs Mars  eKt/V 2.4  H/H 8.9/27.8  Ca/Phos 7.0/6.3  PTH 2181  Alb 3.1    Assessment:         ESRD    Plan:     Ms. Patel is a 27 year old woman with past medical history of liver transplant (1992 for hemangioendothelioma), malignant hypertension presenting for follow up of ESRD on home hemo (NxStage).  Patient initiated on hemodialysis October 2015, trained on home hemodialysis February 2016, now performing at home without difficulty (with main assistance from her sister).  Kt/V previously above goal, reduced treatments to 4x/wk, now at goal, will continue to monitor clearance (stressed adherence with treatments).  Patient followed by Transplant for possible kidney transplantation, h/o liver transplant, recently discussed med adherence with Liver Transplant team.  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  - Hypertension: BP labile, will increase clonidine patch, stressed adherence to med regimen  - Anemia: Hgb previously at goal, improving, continue erythropoetin therapy  - Bone/mineral metabolism: patient with secondary hyperparathyroidism, discussed low phosphorous diet; previously increased calcitriol, cinacalcet (had not received previously due to insurance/pharmacy), stressed adherence to med regimen, patient voiced understanding; will refer to surgery for parathyroidectomy (will need more consistent BP control prior to proceeding).    Return to clinic monthly

## 2018-02-09 NOTE — TELEPHONE ENCOUNTER
Per Dr. Pinedo not biopsy or paracentesis at this time. Spoke with pt, she states she understands. She did not respond when I told her her prograf level is undetectable again. She agreed to follow-up with Dr. Bass regarding her consistently elevated Cr and BP.

## 2018-02-09 NOTE — LETTER
February 9, 2018    Holly Patel  [address] [place] [id number]          Dear Holly Patel:  MRN: [id number]    Your lab results were stable.  There are no medicine changes.  Please have your labs drawn again on 3/19/18.      If you cannot have your labs drawn on the scheduled date, it is your responsibility to call the transplant department to reschedule.  To reschedule or make an appointment, please as to speak to or leave a message for my assistant, Jaki Chavis, at [phone].  When leaving a message for Palmira Pollack, or myself, we ask that you leave a brief message regarding your request.    Sincerely,    Violeta Francis, RN, BSN  Liver Transplant Coordinator  [facility] [place]  [address] [place] [id number] [phone]

## 2018-02-09 NOTE — TELEPHONE ENCOUNTER
----- Message from Leidy Zhang sent at 2/9/2018  8:31 AM CST -----  Contact: Pt  Pt calling to speak with Violeta in regards to scheduling a liver biopsy.       Call back number: [phone]

## 2018-02-09 NOTE — TELEPHONE ENCOUNTER
Letter sent, labs stable and no medication changes are needed. Repeat labs due 3/19/18 per protocol.     no unknown

## 2018-02-16 ENCOUNTER — HOSPITAL ENCOUNTER (EMERGENCY)
Facility: OTHER | Age: 28
Discharge: ANOTHER HOSPITAL | End: 2018-02-16
Attending: EMERGENCY MEDICINE
Payer: MEDICARE

## 2018-02-16 ENCOUNTER — HOSPITAL ENCOUNTER (INPATIENT)
Facility: HOSPITAL | Age: 28
LOS: 4 days | Discharge: HOME HEALTH | DRG: 304 | End: 2018-02-20
Attending: HOSPITALIST | Admitting: HOSPITALIST
Payer: MEDICARE

## 2018-02-16 VITALS
HEART RATE: 119 BPM | OXYGEN SATURATION: 100 % | RESPIRATION RATE: 20 BRPM | WEIGHT: 126 LBS | TEMPERATURE: 98 F | BODY MASS INDEX: 24.74 KG/M2 | SYSTOLIC BLOOD PRESSURE: 173 MMHG | HEIGHT: 60 IN | DIASTOLIC BLOOD PRESSURE: 114 MMHG

## 2018-02-16 DIAGNOSIS — R07.9 CHEST PAIN: ICD-10-CM

## 2018-02-16 DIAGNOSIS — D64.9 ANEMIA, UNSPECIFIED TYPE: ICD-10-CM

## 2018-02-16 DIAGNOSIS — R51.9 NONINTRACTABLE HEADACHE, UNSPECIFIED CHRONICITY PATTERN, UNSPECIFIED HEADACHE TYPE: ICD-10-CM

## 2018-02-16 DIAGNOSIS — Z99.2 ESRD ON DIALYSIS: ICD-10-CM

## 2018-02-16 DIAGNOSIS — N18.6 ESRD ON DIALYSIS: ICD-10-CM

## 2018-02-16 DIAGNOSIS — I16.9 HYPERTENSIVE CRISIS: Primary | ICD-10-CM

## 2018-02-16 DIAGNOSIS — R06.02 SOB (SHORTNESS OF BREATH): ICD-10-CM

## 2018-02-16 DIAGNOSIS — D69.6 THROMBOCYTOPENIA: ICD-10-CM

## 2018-02-16 DIAGNOSIS — R56.9 SEIZURE IN RESPONSE TO ACUTE EVENT: ICD-10-CM

## 2018-02-16 DIAGNOSIS — I16.9 HYPERTENSIVE CRISIS: ICD-10-CM

## 2018-02-16 DIAGNOSIS — I16.0 HYPERTENSIVE URGENCY: Primary | ICD-10-CM

## 2018-02-16 PROBLEM — R79.89 ELEVATED TROPONIN: Status: ACTIVE | Noted: 2018-02-16

## 2018-02-16 LAB
ALBUMIN SERPL-MCNC: 2.9 G/DL (ref 3.3–5.5)
ALP SERPL-CCNC: 468 U/L (ref 42–141)
BILIRUB SERPL-MCNC: 0.5 MG/DL (ref 0.2–1.6)
BUN SERPL-MCNC: 61 MG/DL (ref 7–22)
CALCIUM SERPL-MCNC: 8.9 MG/DL (ref 8–10.3)
CHLORIDE SERPL-SCNC: 96 MMOL/L (ref 98–108)
CREAT SERPL-MCNC: 11.8 MG/DL (ref 0.6–1.2)
GLUCOSE SERPL-MCNC: 106 MG/DL (ref 73–118)
HCG INTACT+B SERPL-ACNC: 1.9 MIU/ML
INR PPP: 1.1
PHOSPHATE SERPL-MCNC: 6.1 MG/DL
POC ALT (SGPT): 40 U/L (ref 10–47)
POC AST (SGOT): 41 U/L (ref 11–38)
POC B-TYPE NATRIURETIC PEPTIDE: 932 PG/ML (ref 0–100)
POC CARDIAC TROPONIN I: 0.58 NG/ML
POC TCO2: 28 MMOL/L (ref 18–33)
POTASSIUM BLD-SCNC: 3.8 MMOL/L (ref 3.6–5.1)
PROTEIN, POC: 7.2 G/DL (ref 6.4–8.1)
PROTHROMBIN TIME: 11.3 SEC
SAMPLE: ABNORMAL
SODIUM BLD-SCNC: 141 MMOL/L (ref 128–145)
TROPONIN I SERPL DL<=0.01 NG/ML-MCNC: 0.79 NG/ML

## 2018-02-16 PROCEDURE — 25000003 PHARM REV CODE 250: Performed by: EMERGENCY MEDICINE

## 2018-02-16 PROCEDURE — 25000003 PHARM REV CODE 250: Performed by: INTERNAL MEDICINE

## 2018-02-16 PROCEDURE — 85025 COMPLETE CBC W/AUTO DIFF WBC: CPT

## 2018-02-16 PROCEDURE — 84702 CHORIONIC GONADOTROPIN TEST: CPT

## 2018-02-16 PROCEDURE — 20000000 HC ICU ROOM

## 2018-02-16 PROCEDURE — 83880 ASSAY OF NATRIURETIC PEPTIDE: CPT

## 2018-02-16 PROCEDURE — 63600175 PHARM REV CODE 636 W HCPCS: Performed by: EMERGENCY MEDICINE

## 2018-02-16 PROCEDURE — 96375 TX/PRO/DX INJ NEW DRUG ADDON: CPT

## 2018-02-16 PROCEDURE — A4216 STERILE WATER/SALINE, 10 ML: HCPCS | Performed by: HOSPITALIST

## 2018-02-16 PROCEDURE — 96365 THER/PROPH/DIAG IV INF INIT: CPT

## 2018-02-16 PROCEDURE — 36415 COLL VENOUS BLD VENIPUNCTURE: CPT

## 2018-02-16 PROCEDURE — 25000003 PHARM REV CODE 250: Performed by: HOSPITALIST

## 2018-02-16 PROCEDURE — 96376 TX/PRO/DX INJ SAME DRUG ADON: CPT

## 2018-02-16 PROCEDURE — 85610 PROTHROMBIN TIME: CPT

## 2018-02-16 PROCEDURE — 84100 ASSAY OF PHOSPHORUS: CPT

## 2018-02-16 PROCEDURE — 63600175 PHARM REV CODE 636 W HCPCS: Performed by: HOSPITALIST

## 2018-02-16 PROCEDURE — 84484 ASSAY OF TROPONIN QUANT: CPT

## 2018-02-16 PROCEDURE — 80053 COMPREHEN METABOLIC PANEL: CPT

## 2018-02-16 PROCEDURE — 99285 EMERGENCY DEPT VISIT HI MDM: CPT | Mod: 25

## 2018-02-16 PROCEDURE — 96366 THER/PROPH/DIAG IV INF ADDON: CPT

## 2018-02-16 PROCEDURE — 63600175 PHARM REV CODE 636 W HCPCS

## 2018-02-16 RX ORDER — PROMETHAZINE HYDROCHLORIDE 25 MG/1
25 SUPPOSITORY RECTAL EVERY 6 HOURS PRN
Status: DISCONTINUED | OUTPATIENT
Start: 2018-02-16 | End: 2018-02-20 | Stop reason: HOSPADM

## 2018-02-16 RX ORDER — ATORVASTATIN CALCIUM 40 MG/1
40 TABLET, FILM COATED ORAL DAILY
Status: DISCONTINUED | OUTPATIENT
Start: 2018-02-17 | End: 2018-02-20 | Stop reason: HOSPADM

## 2018-02-16 RX ORDER — POLYETHYLENE GLYCOL 3350 17 G/17G
17 POWDER, FOR SOLUTION ORAL DAILY
Status: DISCONTINUED | OUTPATIENT
Start: 2018-02-17 | End: 2018-02-20 | Stop reason: HOSPADM

## 2018-02-16 RX ORDER — AMOXICILLIN 250 MG
1 CAPSULE ORAL 2 TIMES DAILY
Status: DISCONTINUED | OUTPATIENT
Start: 2018-02-16 | End: 2018-02-20 | Stop reason: HOSPADM

## 2018-02-16 RX ORDER — HEPARIN SODIUM 5000 [USP'U]/ML
5000 INJECTION, SOLUTION INTRAVENOUS; SUBCUTANEOUS EVERY 8 HOURS
Status: DISCONTINUED | OUTPATIENT
Start: 2018-02-16 | End: 2018-02-20 | Stop reason: HOSPADM

## 2018-02-16 RX ORDER — NICARDIPINE HYDROCHLORIDE 0.2 MG/ML
1 INJECTION INTRAVENOUS CONTINUOUS
Status: DISCONTINUED | OUTPATIENT
Start: 2018-02-16 | End: 2018-02-17

## 2018-02-16 RX ORDER — NITROGLYCERIN 20 MG/100ML
5 INJECTION INTRAVENOUS CONTINUOUS
Status: DISCONTINUED | OUTPATIENT
Start: 2018-02-16 | End: 2018-02-16 | Stop reason: HOSPADM

## 2018-02-16 RX ORDER — ACETAMINOPHEN 325 MG/1
650 TABLET ORAL EVERY 6 HOURS PRN
Status: DISCONTINUED | OUTPATIENT
Start: 2018-02-16 | End: 2018-02-20 | Stop reason: HOSPADM

## 2018-02-16 RX ORDER — ATORVASTATIN CALCIUM 40 MG/1
80 TABLET, FILM COATED ORAL DAILY
Status: DISCONTINUED | OUTPATIENT
Start: 2018-02-17 | End: 2018-02-16

## 2018-02-16 RX ORDER — LORAZEPAM 2 MG/ML
INJECTION INTRAMUSCULAR
Status: COMPLETED
Start: 2018-02-16 | End: 2018-02-16

## 2018-02-16 RX ORDER — BISACODYL 10 MG
10 SUPPOSITORY, RECTAL RECTAL DAILY PRN
Status: DISCONTINUED | OUTPATIENT
Start: 2018-02-16 | End: 2018-02-20 | Stop reason: HOSPADM

## 2018-02-16 RX ORDER — SODIUM CHLORIDE 0.9 % (FLUSH) 0.9 %
3 SYRINGE (ML) INJECTION EVERY 8 HOURS
Status: DISCONTINUED | OUTPATIENT
Start: 2018-02-16 | End: 2018-02-20 | Stop reason: HOSPADM

## 2018-02-16 RX ORDER — NITROGLYCERIN 0.4 MG/1
0.4 TABLET SUBLINGUAL EVERY 5 MIN PRN
Status: DISCONTINUED | OUTPATIENT
Start: 2018-02-16 | End: 2018-02-20 | Stop reason: HOSPADM

## 2018-02-16 RX ORDER — LORAZEPAM 2 MG/ML
2 INJECTION INTRAMUSCULAR ONCE
Status: COMPLETED | OUTPATIENT
Start: 2018-02-16 | End: 2018-02-16

## 2018-02-16 RX ORDER — DEXTROMETHORPHAN POLISTIREX 30 MG/5 ML
1 SUSPENSION, EXTENDED RELEASE 12 HR ORAL DAILY PRN
Status: DISCONTINUED | OUTPATIENT
Start: 2018-02-16 | End: 2018-02-20 | Stop reason: HOSPADM

## 2018-02-16 RX ORDER — LORAZEPAM 2 MG/ML
2 INJECTION INTRAMUSCULAR
Status: COMPLETED | OUTPATIENT
Start: 2018-02-16 | End: 2018-02-18

## 2018-02-16 RX ORDER — FUROSEMIDE 10 MG/ML
60 INJECTION INTRAMUSCULAR; INTRAVENOUS
Status: COMPLETED | OUTPATIENT
Start: 2018-02-16 | End: 2018-02-16

## 2018-02-16 RX ORDER — DIAZEPAM 5 MG/1
10 TABLET ORAL
Status: COMPLETED | OUTPATIENT
Start: 2018-02-16 | End: 2018-02-16

## 2018-02-16 RX ORDER — HYDROXYZINE PAMOATE 25 MG/1
25 CAPSULE ORAL
Status: COMPLETED | OUTPATIENT
Start: 2018-02-16 | End: 2018-02-16

## 2018-02-16 RX ORDER — LABETALOL HYDROCHLORIDE 5 MG/ML
20 INJECTION, SOLUTION INTRAVENOUS EVERY 4 HOURS PRN
Status: DISCONTINUED | OUTPATIENT
Start: 2018-02-16 | End: 2018-02-20 | Stop reason: HOSPADM

## 2018-02-16 RX ORDER — RAMELTEON 8 MG/1
8 TABLET ORAL NIGHTLY PRN
Status: DISCONTINUED | OUTPATIENT
Start: 2018-02-16 | End: 2018-02-20 | Stop reason: HOSPADM

## 2018-02-16 RX ORDER — HYDRALAZINE HYDROCHLORIDE 20 MG/ML
20 INJECTION INTRAMUSCULAR; INTRAVENOUS
Status: COMPLETED | OUTPATIENT
Start: 2018-02-16 | End: 2018-02-16

## 2018-02-16 RX ORDER — ASPIRIN 325 MG
325 TABLET ORAL DAILY
Status: DISCONTINUED | OUTPATIENT
Start: 2018-02-17 | End: 2018-02-18

## 2018-02-16 RX ORDER — BUTALBITAL, ACETAMINOPHEN AND CAFFEINE 50; 325; 40 MG/1; MG/1; MG/1
1 TABLET ORAL
Status: COMPLETED | OUTPATIENT
Start: 2018-02-16 | End: 2018-02-16

## 2018-02-16 RX ORDER — ONDANSETRON 2 MG/ML
8 INJECTION INTRAMUSCULAR; INTRAVENOUS EVERY 8 HOURS PRN
Status: DISCONTINUED | OUTPATIENT
Start: 2018-02-16 | End: 2018-02-20 | Stop reason: HOSPADM

## 2018-02-16 RX ORDER — LABETALOL HYDROCHLORIDE 5 MG/ML
20 INJECTION, SOLUTION INTRAVENOUS ONCE
Status: COMPLETED | OUTPATIENT
Start: 2018-02-16 | End: 2018-02-16

## 2018-02-16 RX ADMIN — Medication 3 ML: at 11:02

## 2018-02-16 RX ADMIN — HYDRALAZINE HYDROCHLORIDE 20 MG: 20 INJECTION INTRAMUSCULAR; INTRAVENOUS at 04:02

## 2018-02-16 RX ADMIN — FUROSEMIDE 60 MG: 10 INJECTION, SOLUTION INTRAMUSCULAR; INTRAVENOUS at 04:02

## 2018-02-16 RX ADMIN — HYDROXYZINE PAMOATE 25 MG: 25 CAPSULE ORAL at 07:02

## 2018-02-16 RX ADMIN — NITROGLYCERIN 1 INCH: 20 OINTMENT TOPICAL at 04:02

## 2018-02-16 RX ADMIN — DOCUSATE SODIUM AND SENNOSIDES 1 TABLET: 8.6; 5 TABLET, FILM COATED ORAL at 10:02

## 2018-02-16 RX ADMIN — HYDRALAZINE HYDROCHLORIDE 20 MG: 20 INJECTION INTRAMUSCULAR; INTRAVENOUS at 03:02

## 2018-02-16 RX ADMIN — NICARDIPINE HYDROCHLORIDE 2.5 MG/HR: 0.2 INJECTION, SOLUTION INTRAVENOUS at 09:02

## 2018-02-16 RX ADMIN — BUTALBITAL, ACETAMINOPHEN, AND CAFFEINE 1 TABLET: 50; 325; 40 TABLET ORAL at 04:02

## 2018-02-16 RX ADMIN — LORAZEPAM 2 MG: 2 INJECTION INTRAMUSCULAR at 09:02

## 2018-02-16 RX ADMIN — LABETALOL HYDROCHLORIDE 20 MG: 5 INJECTION, SOLUTION INTRAVENOUS at 09:02

## 2018-02-16 RX ADMIN — NITROGLYCERIN 5 MCG/MIN: 20 INJECTION INTRAVENOUS at 05:02

## 2018-02-16 RX ADMIN — LORAZEPAM 2 MG: 2 INJECTION INTRAMUSCULAR; INTRAVENOUS at 10:02

## 2018-02-16 RX ADMIN — LORAZEPAM 2 MG: 2 INJECTION, SOLUTION INTRAMUSCULAR; INTRAVENOUS at 09:02

## 2018-02-16 RX ADMIN — DIAZEPAM 10 MG: 5 TABLET ORAL at 04:02

## 2018-02-16 NOTE — ED NOTES
Pt c/o SOB- placed on O2 2LNC. Reports posterior occipital headache and generalized neck pain. Dr. Muse notified.Medicated as ordered. Remains sinus tachycardia 112 no ectopy on monitor and hypertensive .

## 2018-02-16 NOTE — ED PROVIDER NOTES
"Encounter Date: 2018       History     Chief Complaint   Patient presents with    Headache    Hypertension     Chief complaint: Elevated blood pressure  27-year-old complains of elevated blood pressure.  Patient began having a right temporal headache around 2 PM while doing home dialysis.  She checked her blood pressure and it was elevated.  Patient did not take anything for the pain.  She complains of mild blurry vision to the right eye as well.  Patient says that she gets headaches like this often when her blood pressure is high.  Patient did not put her clonidine patch on today after removing the patch.  She only did her dialysis for about 30 minutes instead of 2 hours.  She denies chest pain or shortness of breath.  No difficulty walking or talking.  Patient's mother says "her blood pressure is always elevated."      The history is provided by the patient, a parent and medical records.     Review of patient's allergies indicates:   Allergen Reactions    Chloral hydrate      Other reaction(s): Hallucinations  Other reaction(s): Hives    Hydrocodone Other (See Comments)     Mental status changes     Past Medical History:   Diagnosis Date    Anemia in ESRD (end-stage renal disease) 10/12/2015    Chronic rejection of liver transplant 3/22/2016    ESRD on hemodialysis 2015    History of splenomegaly 2016    Immunosuppressed 2017    Iron deficiency anemia secondary to inadequate dietary iron intake 2017    She [facility] iron [facility].   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 ()    Moderate protein-calorie malnutrition 2017    MRSA bacteremia 2017    Prophylactic immunotherapy 2014    Renovascular hypertension 10/2/2015    Secondary hyperparathyroidism 2017    Thrombocytopenia 2016     Past Surgical History:   Procedure Laterality Date     SECTION      2     KIDNEY TRANSPLANT      " LIVER BIOPSY      LIVER TRANSPLANT  09/1992    TUBAL LIGATION       Family History   Problem Relation Age of Onset    Hypertension Mother     Hypertension Father     Melanoma Neg Hx      Social History   Substance Use Topics    Smoking status: Never Smoker    Smokeless tobacco: Never Used    Alcohol use No     Review of Systems   Constitutional: Negative for fever.   HENT: Negative for sore throat.    Eyes: Positive for visual disturbance.   Respiratory: Negative for shortness of breath.    Cardiovascular: Negative for chest pain.   Gastrointestinal: Negative for nausea.   Genitourinary: Positive for decreased urine volume. Negative for dysuria.   Musculoskeletal: Positive for neck pain. Negative for back pain.   Skin: Negative for rash.   Neurological: Positive for headaches. Negative for weakness.       Physical Exam     Initial Vitals [02/16/18 1504]   BP Pulse Resp Temp SpO2   (!) 221/149 106 18 98.5 °F (36.9 °C) 99 %      MAP       173         Physical Exam    Nursing note and vitals reviewed.  Constitutional: She appears well-developed and well-nourished.   HENT:   Head: Normocephalic and atraumatic.   Eyes: Conjunctivae and EOM are normal. Pupils are equal, round, and reactive to light.   Neck: Normal range of motion. Neck supple.   Cardiovascular: Regular rhythm. Tachycardia present.    Pulmonary/Chest: Breath sounds normal.   Abdominal: Soft. There is no tenderness. There is no rebound and no guarding.   Musculoskeletal: Normal range of motion.   Neurological: She is alert and oriented to person, place, and time. She has normal strength. No cranial nerve deficit.   Skin: Skin is warm and dry.   Dialysis shunt left arm   Psychiatric: She has a normal mood and affect.         ED Course   Critical Care  Date/Time: 2/16/2018 5:18 PM  Performed by: JOB WIN.  Authorized by: JOB WIN   Direct patient critical care time: 60 minutes  Additional history critical care time: 5  minutes  Ordering / reviewing critical care time: 8 minutes  Documentation critical care time: 8 minutes  Consulting other physicians critical care time: 8 minutes  Total critical care time (exclusive of procedural time) : 89 minutes  Critical care was necessary to treat or prevent imminent or life-threatening deterioration of the following conditions: cardiac failure and renal failure.  Critical care was time spent personally by me on the following activities: discussions with consultants, development of treatment plan with patient or surrogate, examination of patient, ordering and performing treatments and interventions, ordering and review of radiographic studies, re-evaluation of patient's condition, pulse oximetry, review of old charts, ordering and review of laboratory studies, obtaining history from patient or surrogate and evaluation of patient's response to treatment.        Labs Reviewed - No data to display  EKG Readings: (Independently Interpreted)   Sinus tachycardia, heart rate 102, no ST segment elevation, normal QT, normal HI          Medical Decision Making:   Initial Assessment:   27-year-old who is on dialysis presents with elevated blood pressure and a headache.  On exam patient has no neurological deficits she is hypertensive.  Patient put a clonidine patch on prior to arrival.  She had forgotten to put a patch on when she removed the first patch.  ED Management:  Patient's blood pressure at triage is 221/149.  IV will be established and patient will be given hydralazine 20 mg IV.  Patient's blood pressure did not improve with hydralazine.  She was given a second dose of hydralazine and an inch of Nitropaste was placed on her chest wall.  She was also given Lasix.  Patient's blood pressure still remained elevated so a Tridil drip was ordered.  She was given Valium for neck pain and Fioricet for headache.  Labs were significant for BUN of 61/creatinine 11.8, , , troponin was  elevated at 0.58, H&H 9/27, platelets 76,000.  Chest x-ray does show mild congestive heart failure.  Patient will be transferred to the [facility] on a Tridil drip.   accepted by Dr Wheatley                      Clinical Impression:   The primary encounter diagnosis was Hypertensive crisis. Diagnoses of SOB (shortness of breath), ESRD on dialysis, and Nonintractable headache, unspecified chronicity pattern, unspecified headache type were also pertinent to this visit.                           Nydia Muse MD  02/16/18 1718       Nydia Muse MD  02/16/18 1719       Nydia Muse MD  02/16/18 1727       Nydia Muse MD  02/16/18 1808

## 2018-02-16 NOTE — ED TRIAGE NOTES
"Chief c/o "headache- I had to stop my dialysis". Hx liver transplant. Denies chest pain/ sob. Neuro checks W NL   "

## 2018-02-17 LAB
ALBUMIN SERPL BCP-MCNC: 2.9 G/DL
ALP SERPL-CCNC: 570 U/L
ALT SERPL W/O P-5'-P-CCNC: 33 U/L
ANION GAP SERPL CALC-SCNC: 14 MMOL/L
AST SERPL-CCNC: 29 U/L
BILIRUB SERPL-MCNC: 0.6 MG/DL
BUN SERPL-MCNC: 66 MG/DL
CALCIUM SERPL-MCNC: 9.6 MG/DL
CHLORIDE SERPL-SCNC: 97 MMOL/L
CHOLEST SERPL-MCNC: 245 MG/DL
CHOLEST/HDLC SERPL: 4.2 {RATIO}
CO2 SERPL-SCNC: 25 MMOL/L
CREAT SERPL-MCNC: 12.3 MG/DL
DIASTOLIC DYSFUNCTION: YES
EST. GFR  (AFRICAN AMERICAN): 4 ML/MIN/1.73 M^2
EST. GFR  (NON AFRICAN AMERICAN): 4 ML/MIN/1.73 M^2
ESTIMATED PA SYSTOLIC PRESSURE: 44.24
GLOBAL PERICARDIAL EFFUSION: ABNORMAL
GLUCOSE SERPL-MCNC: 99 MG/DL
HDLC SERPL-MCNC: 58 MG/DL
HDLC SERPL: 23.7 %
LDLC SERPL CALC-MCNC: 169.6 MG/DL
MAGNESIUM SERPL-MCNC: 2.2 MG/DL
MITRAL VALVE MOBILITY: NORMAL
NONHDLC SERPL-MCNC: 187 MG/DL
POTASSIUM SERPL-SCNC: 3.9 MMOL/L
PROT SERPL-MCNC: 7.7 G/DL
RETIRED EF AND QEF - SEE NOTES: 65 (ref 55–65)
SODIUM SERPL-SCNC: 136 MMOL/L
TRICUSPID VALVE REGURGITATION: ABNORMAL
TRIGL SERPL-MCNC: 87 MG/DL
TROPONIN I SERPL DL<=0.01 NG/ML-MCNC: 0.68 NG/ML
TROPONIN I SERPL DL<=0.01 NG/ML-MCNC: 0.78 NG/ML
TROPONIN I SERPL DL<=0.01 NG/ML-MCNC: 0.9 NG/ML
TROPONIN I SERPL DL<=0.01 NG/ML-MCNC: 0.95 NG/ML

## 2018-02-17 PROCEDURE — 83735 ASSAY OF MAGNESIUM: CPT

## 2018-02-17 PROCEDURE — 36415 COLL VENOUS BLD VENIPUNCTURE: CPT

## 2018-02-17 PROCEDURE — A4216 STERILE WATER/SALINE, 10 ML: HCPCS | Performed by: HOSPITALIST

## 2018-02-17 PROCEDURE — 63600175 PHARM REV CODE 636 W HCPCS: Performed by: HOSPITALIST

## 2018-02-17 PROCEDURE — 25000003 PHARM REV CODE 250: Performed by: HOSPITALIST

## 2018-02-17 PROCEDURE — 63600175 PHARM REV CODE 636 W HCPCS: Performed by: PSYCHIATRY & NEUROLOGY

## 2018-02-17 PROCEDURE — 80053 COMPREHEN METABOLIC PANEL: CPT

## 2018-02-17 PROCEDURE — 93306 TTE W/DOPPLER COMPLETE: CPT

## 2018-02-17 PROCEDURE — 80061 LIPID PANEL: CPT

## 2018-02-17 PROCEDURE — 25000003 PHARM REV CODE 250: Performed by: INTERNAL MEDICINE

## 2018-02-17 PROCEDURE — 84484 ASSAY OF TROPONIN QUANT: CPT | Mod: 91

## 2018-02-17 PROCEDURE — 80197 ASSAY OF TACROLIMUS: CPT

## 2018-02-17 PROCEDURE — 94761 N-INVAS EAR/PLS OXIMETRY MLT: CPT

## 2018-02-17 PROCEDURE — 21400001 HC TELEMETRY ROOM

## 2018-02-17 PROCEDURE — 99291 CRITICAL CARE FIRST HOUR: CPT | Mod: ,,, | Performed by: INTERNAL MEDICINE

## 2018-02-17 PROCEDURE — 93306 TTE W/DOPPLER COMPLETE: CPT | Mod: 26,,, | Performed by: INTERNAL MEDICINE

## 2018-02-17 PROCEDURE — 99222 1ST HOSP IP/OBS MODERATE 55: CPT | Mod: ,,, | Performed by: PSYCHIATRY & NEUROLOGY

## 2018-02-17 RX ORDER — LEVETIRACETAM 5 MG/ML
500 INJECTION INTRAVASCULAR EVERY 12 HOURS
Status: DISCONTINUED | OUTPATIENT
Start: 2018-02-17 | End: 2018-02-18

## 2018-02-17 RX ORDER — LEVETIRACETAM 10 MG/ML
1000 INJECTION INTRAVASCULAR EVERY 12 HOURS
Status: DISCONTINUED | OUTPATIENT
Start: 2018-02-17 | End: 2018-02-17

## 2018-02-17 RX ORDER — FAMOTIDINE 20 MG/1
20 TABLET, FILM COATED ORAL DAILY
Status: DISCONTINUED | OUTPATIENT
Start: 2018-02-17 | End: 2018-02-20 | Stop reason: HOSPADM

## 2018-02-17 RX ORDER — CINACALCET 30 MG/1
30 TABLET, FILM COATED ORAL
Status: DISCONTINUED | OUTPATIENT
Start: 2018-02-17 | End: 2018-02-20 | Stop reason: HOSPADM

## 2018-02-17 RX ORDER — PREDNISONE 1 MG/1
1 TABLET ORAL EVERY OTHER DAY
Status: DISCONTINUED | OUTPATIENT
Start: 2018-02-17 | End: 2018-02-20 | Stop reason: HOSPADM

## 2018-02-17 RX ORDER — CLONIDINE 0.2 MG/24H
1 PATCH, EXTENDED RELEASE TRANSDERMAL
Status: DISCONTINUED | OUTPATIENT
Start: 2018-02-17 | End: 2018-02-20 | Stop reason: HOSPADM

## 2018-02-17 RX ORDER — HYDRALAZINE HYDROCHLORIDE 25 MG/1
25 TABLET, FILM COATED ORAL EVERY 12 HOURS
Status: DISCONTINUED | OUTPATIENT
Start: 2018-02-17 | End: 2018-02-20 | Stop reason: HOSPADM

## 2018-02-17 RX ORDER — NIFEDIPINE 30 MG/1
60 TABLET, EXTENDED RELEASE ORAL DAILY
Status: DISCONTINUED | OUTPATIENT
Start: 2018-02-17 | End: 2018-02-20 | Stop reason: HOSPADM

## 2018-02-17 RX ORDER — FAMOTIDINE 20 MG/1
20 TABLET, FILM COATED ORAL 2 TIMES DAILY
Status: DISCONTINUED | OUTPATIENT
Start: 2018-02-17 | End: 2018-02-17 | Stop reason: DRUGHIGH

## 2018-02-17 RX ORDER — LABETALOL 100 MG/1
800 TABLET, FILM COATED ORAL EVERY 8 HOURS
Status: DISCONTINUED | OUTPATIENT
Start: 2018-02-17 | End: 2018-02-20 | Stop reason: HOSPADM

## 2018-02-17 RX ORDER — TACROLIMUS 1 MG/1
5 CAPSULE ORAL 2 TIMES DAILY
Status: DISCONTINUED | OUTPATIENT
Start: 2018-02-17 | End: 2018-02-20 | Stop reason: HOSPADM

## 2018-02-17 RX ADMIN — LABETALOL HYDROCHLORIDE 800 MG: 100 TABLET, FILM COATED ORAL at 10:02

## 2018-02-17 RX ADMIN — FUROSEMIDE 100 MG: 40 TABLET ORAL at 08:02

## 2018-02-17 RX ADMIN — ATORVASTATIN CALCIUM 40 MG: 40 TABLET, FILM COATED ORAL at 08:02

## 2018-02-17 RX ADMIN — Medication 3 ML: at 05:02

## 2018-02-17 RX ADMIN — DOCUSATE SODIUM AND SENNOSIDES 1 TABLET: 8.6; 5 TABLET, FILM COATED ORAL at 10:02

## 2018-02-17 RX ADMIN — TACROLIMUS 5 MG: 1 CAPSULE ORAL at 05:02

## 2018-02-17 RX ADMIN — NIFEDIPINE 60 MG: 30 TABLET, FILM COATED, EXTENDED RELEASE ORAL at 10:02

## 2018-02-17 RX ADMIN — FAMOTIDINE 20 MG: 20 TABLET, FILM COATED ORAL at 08:02

## 2018-02-17 RX ADMIN — HYDRALAZINE HYDROCHLORIDE 25 MG: 25 TABLET ORAL at 10:02

## 2018-02-17 RX ADMIN — HEPARIN SODIUM 5000 UNITS: 5000 INJECTION, SOLUTION INTRAVENOUS; SUBCUTANEOUS at 10:02

## 2018-02-17 RX ADMIN — LEVETIRACETAM 500 MG: 5 INJECTION INTRAVENOUS at 10:02

## 2018-02-17 RX ADMIN — FUROSEMIDE 100 MG: 40 TABLET ORAL at 05:02

## 2018-02-17 RX ADMIN — CLONIDINE 1 PATCH: 0.2 PATCH TRANSDERMAL at 08:02

## 2018-02-17 RX ADMIN — Medication 3 ML: at 02:02

## 2018-02-17 RX ADMIN — HYDRALAZINE HYDROCHLORIDE 25 MG: 25 TABLET ORAL at 08:02

## 2018-02-17 RX ADMIN — LEVETIRACETAM 1000 MG: 10 INJECTION INTRAVENOUS at 05:02

## 2018-02-17 RX ADMIN — HEPARIN SODIUM 5000 UNITS: 5000 INJECTION, SOLUTION INTRAVENOUS; SUBCUTANEOUS at 05:02

## 2018-02-17 RX ADMIN — LABETALOL HYDROCHLORIDE 800 MG: 100 TABLET, FILM COATED ORAL at 01:02

## 2018-02-17 RX ADMIN — HEPARIN SODIUM 5000 UNITS: 5000 INJECTION, SOLUTION INTRAVENOUS; SUBCUTANEOUS at 01:02

## 2018-02-17 RX ADMIN — NICARDIPINE HYDROCHLORIDE 10 MG/HR: 0.2 INJECTION, SOLUTION INTRAVENOUS at 02:02

## 2018-02-17 RX ADMIN — TACROLIMUS 5 MG: 1 CAPSULE ORAL at 08:02

## 2018-02-17 RX ADMIN — LABETALOL HYDROCHLORIDE 800 MG: 100 TABLET, FILM COATED ORAL at 05:02

## 2018-02-17 RX ADMIN — PREDNISONE 1 MG: 1 TABLET ORAL at 08:02

## 2018-02-17 RX ADMIN — ASPIRIN 325 MG ORAL TABLET 325 MG: 325 PILL ORAL at 08:02

## 2018-02-17 NOTE — ASSESSMENT & PLAN NOTE
Pt with BP that did not respond to multiple IV pushes in the ED  Placed on cardene gtt that has now been weaned off  Pt resumed on home regimen   BP stable and controlled on PO regimen  Stable for transfer to the floor  Non-compliance to medication regimen the likely culprit in this case

## 2018-02-17 NOTE — ASSESSMENT & PLAN NOTE
Trended markers  Likely demand ischemia per Cards in setting of uncontrolled HTN and ESRD  Monitor on telemetry  Workup with ECHO pending  Possible further workup per Cards

## 2018-02-17 NOTE — PLAN OF CARE
Transferred from ICU       02/17/18 1740   Discharge Assessment   Assessment Type Discharge Planning Assessment   Confirmed/corrected address and phone number on facesheet? Yes   Assessment information obtained from? Patient   Communicated expected length of stay with patient/caregiver no   Prior to hospitilization cognitive status: Alert/Oriented   Prior to hospitalization functional status: Independent   Current cognitive status: Alert/Oriented   Current Functional Status: Independent   Lives With parent(s)   Able to Return to Prior Arrangements yes   Is patient able to care for self after discharge? Yes   Patient's perception of discharge disposition admitted as an inpatient   Readmission Within The Last 30 Days no previous admission in last 30 days   Patient currently being followed by outpatient case management? No   Patient currently receives home health services? No   Patient currently receives any other outside agency services? No   Equipment Currently Used at Home none   Do you have any problems affording any of your prescribed medications? No   Is the patient taking medications as prescribed? yes   Does the patient have transportation home? Yes   Transportation Available family or friend will provide   Does the patient receive services at the [facility]? No   Discharge Plan A Home with family   Discharge Plan B Home with family;Home Health   Patient/Family In Agreement With Plan yes   Does the patient currently use HME? No   Does the patient receive outpatient dialysis? No   Are there any open cases? No     [id number] - [place], [place] - [address]  [address]  [facility] [address]  Phone: [phone] Fax: [phone]    [facility] [place], [place] - [address] AT Portal to Registered [facility] Sites  [address]  [facility] [address]  Phone: [phone] Fax: [phone]    [facility] [address] [place], [place] [address] - [address]  [address]  [facility] [place] [address]  Phone: [phone] Fax: [phone]

## 2018-02-17 NOTE — NURSING TRANSFER
Nursing Transfer Note      2/17/2018     Transfer 338B    Transfer via wheelchair    Transfer with cardiac monitoring    Transported by ICU RN & transport    Medicines sent: yes    Chart send with patient: Yes    Notified: mother at bedside    Patient reassessed at: 2/17/18 1130    Upon arrival to floor: cardiac monitor applied, patient oriented to room, call bell in reach and bed in lowest position

## 2018-02-17 NOTE — HPI
"27 y.o. female that (in part)  has a past medical history of Anemia in ESRD (end-stage renal disease); Chronic rejection of liver transplant; ESRD on hemodialysis; History of splenomegaly; Immunosuppressed; Iron deficiency anemia secondary to inadequate dietary iron intake; Liver replaced by transplant; Moderate protein-calorie malnutrition; MRSA bacteremia; Prophylactic immunotherapy; Renovascular hypertension; Secondary hyperparathyroidism; and Thrombocytopenia. Presents to [facility] Emergency Department Complaining of elevated blood pressure.  Associated symptoms include a headache over the right temporal region with acute onset at approximately 2 PM today while doing home dialysis.  She also has associated mild blurry vision.  Her blood pressure was markedly elevated at home and when she arrived emergency department for systolic blood pressure was as high as 230.  She had removed a clonidine patch earlier in the day and did not promote any one.  She normally does dialysis for approximately 2 hours but to dialysis for approximately 30 minutes and stopped due to the symptoms noted above.  She reports being compliant normally with her outpatient medication regimen which includes a clonidine patch, torsemide, labetalol, nifedipine, and hydralazine.  She also takes Prograf and steroids for liver transplant.  She denies peripheral edema, chest pain, shortness of breath, or dyspnea with exertion.  No diaphoresis or paresthesias.  There is report that her blood pressure is "always elevated".  Generalized location.  Generalized radiation.  Severe intensity.  Recurrent frequency.  Constant duration.     In the emergency department routine laboratory studies, cardiac enzymes, chest x-ray, EKG were obtained.  She was given hydralazine IV for the elevated blood pressure which did not show any improvement.  She was then given an additional dose of hydralazine and Nitropaste was placed on her chest " reagan, again without significant improvement.  She was given Lasix and then started on a Tridil drip.  She also received Valium and Fioricet.  It was noted that she had elevated troponin level and some evidence of mild respiratory failure on chest x-ray.  Original referral center notified for transfer to [facility].  Shortly after arrival she began having seizures lasting approximately 5 seconds and occurring every 5 minutes for approximately 4 episodes.  Seizures have been responsive to Ativan and initiation of Cardene drip.      Pt seen in ICU, case d/w Dr. Maharaj.  Pt admitted with htn emerg and associated HA/seizure.  Bp controlled on cardene gtt.  Pt unable to provide much in the way of hx this am, but BP appears controlled off cardene gtt, ?compliance issue.

## 2018-02-17 NOTE — H&P
"[facility] Medicine  History & Physical    Patient Name: Holly Patel  MRN: [id number]  Admission Date: 02/16/2018  Attending Physician: Philip Sargent MD, MPH      PCP:     Stan Sosa MD    CC:     Transfer for hypertensive crisis    HISTORY OF PRESENT ILLNESS:     Holly Patel is a 27 y.o. female that (in part)  has a past medical history of Anemia in ESRD (end-stage renal disease); Chronic rejection of liver transplant; ESRD on hemodialysis; History of splenomegaly; Immunosuppressed; Iron deficiency anemia secondary to inadequate dietary iron intake; Liver replaced by transplant; Moderate protein-calorie malnutrition; MRSA bacteremia; Prophylactic immunotherapy; Renovascular hypertension; Secondary hyperparathyroidism; and Thrombocytopenia. Presents to [facility] Emergency Department Complaining of elevated blood pressure.  Associated symptoms include a headache over the right temporal region with acute onset at approximately 2 PM today while doing home dialysis.  She also has associated mild blurry vision.  Her blood pressure was markedly elevated at home and when she arrived emergency department for systolic blood pressure was as high as 230.  She had removed a clonidine patch earlier in the day and did not promote any one.  She normally does dialysis for approximately 2 hours but to dialysis for approximately 30 minutes and stopped due to the symptoms noted above.  She reports being compliant normally with her outpatient medication regimen which includes a clonidine patch, torsemide, labetalol, nifedipine, and hydralazine.  She also takes Prograf and steroids for liver transplant.  She denies peripheral edema, chest pain, shortness of breath, or dyspnea with exertion.  No diaphoresis or paresthesias.  There is report that her blood pressure is "always elevated".  Generalized location.  Generalized radiation.  Severe intensity.  " Recurrent frequency.  Constant duration.    In the emergency department routine laboratory studies, cardiac enzymes, chest x-ray, EKG were obtained.  She was given hydralazine IV for the elevated blood pressure which did not show any improvement.  She was then given an additional dose of hydralazine and Nitropaste was placed on her chest wall, again without significant improvement.  She was given Lasix and then started on a Tridil drip.  She also received Valium and Fioricet.  It was noted that she had elevated troponin level and some evidence of mild respiratory failure on chest x-ray.  Original referral center notified for transfer to [facility].  Shortly after arrival she began having seizures lasting approximately 5 seconds and occurring every 5 minutes for approximately 4 episodes.  Seizures have been responsive to Ativan and initiation of Cardene drip.      Hospital medicine has been asked to admit for further evaluation and treatment.       REVIEW OF SYSTEMS:     -- Constitutional: No fever or chills.  -- Eyes: Blurry vision.  No diplopia, pain, tearing, blind spots, or discharge.   -- Ears, nose, mouth, throat, and face: No congestion, sore throat, epistaxis, d/c, bleeding gums, neck stiffness masses, or dental issues.  -- Respiratory: No cough, shortness of breath, hemoptysis, stridor, wheezing, or night sweats.  -- Cardiovascular: Elevated blood pressure.  No chest pain, MENDOSA, syncope, PND, edema, cyanosis, or palpitations.   -- Gastrointestinal: History of liver transplant.  No vomiting, abdominal pain, hematemesis, melena, dyspepsia, or change in bowel habits.  -- Genitourinary: Decreased urine output secondary to End-stage renal disease.  Denies vaginal discharge .  -- Integument/breast: No rash, pruritis, pigmentation changes, dryness, or changes in hair  -- Hematologic/lymphatic: No easy bruising or lymphadenopathy.   -- Musculoskeletal: No acute arthralgias, acute myalgias, joint swelling,  acute limitations of ROM, or acute muscular weakness.  -- Neurological: + Headache + new onset seizures.    -- Behavioral/Psych: No auditory or visual hallucinations, depression, or suicidal/homicidal ideations.  -- Endocrine: No heat or cold intolerance, polydipsia, or unintentional weight gain / loss.  -- Allergy/Immunologic: on chronic immunosuppression status post liver transplant.  No adverse reaction to food, insects, or difficulty breathing.    Pain Scale  5 on scale of 1 to 10 headache    PAST MEDICAL / SURGICAL HISTORY:     Past Medical History:   Diagnosis Date    Anemia in ESRD (end-stage renal disease) 10/12/2015    Chronic rejection of liver transplant 3/22/2016    ESRD on hemodialysis 2015    History of splenomegaly 2016    Immunosuppressed 2017    Iron deficiency anemia secondary to inadequate dietary iron intake 2017    She receives IV iron periodically at the D[facility].   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 ()    Moderate protein-calorie malnutrition 2017    MRSA bacteremia 2017    Prophylactic immunotherapy 2014    Renovascular hypertension 10/2/2015    Secondary hyperparathyroidism 2017    Thrombocytopenia 2016     Past Surgical History:   Procedure Laterality Date     SECTION      2     KIDNEY TRANSPLANT      LIVER BIOPSY      LIVER TRANSPLANT  1992    TUBAL LIGATION           FAMILY HISTORY:     Family History   Problem Relation Age of Onset    Hypertension Mother     Hypertension Father     Melanoma Neg Hx          SOCIAL HISTORY:     Social History   Substance Use Topics    Smoking status: Never Smoker    Smokeless tobacco: Never Used    Alcohol use No     Social History     Social History    Marital status:      Spouse name: N/A    Number of children: N/A    Years of education: N/A     Social History Main Topics    Smoking status: Never Smoker    Smokeless  tobacco: Never Used    Alcohol use No    Drug use: No    Sexual activity: Yes     Partners: Male     Other Topics Concern    Are You Pregnant Or Think You May Be? No    Breast-Feeding No     Social History Narrative    Lives 2 kids, 7 and 8, and nephew. Her mom helps with kids.         ALLERGIES:       Review of patient's allergies indicates:   Allergen Reactions    Chloral hydrate      Other reaction(s): Hallucinations  Other reaction(s): Hives    Hydrocodone Other (See Comments)     Mental status changes         HEALTH SCREENING:     Influenza vaccine not up-to-date for this season per medical record  Prevnar 13 pneumonia vaccine =  evidence of previous vaccination found in the medical record      HOME MEDICATIONS:     Prior to Admission medications    Medication Sig Start Date End Date Taking? Authorizing Provider   cinacalcet (SENSIPAR) 30 MG Tab Take 1 tablet (30 mg total) by mouth daily with breakfast. 1/26/18 1/26/19  Jacky Escalera MD   cloNIDine 0.2 mg/24 hr td ptwk (CATAPRES) 0.2 mg/24 hr Place 1 patch onto the skin every 7 days. Change every Friday 1/22/18 1/22/19  Viktor Bass MD   famotidine (PEPCID) 20 MG tablet Take 1 tablet (20 mg total) by mouth 2 (two) times daily. 1/7/18 1/7/19  Rosales Liu MD   food supplemt, lactose-reduced (ENSURE ACTIVE HIGH PROTEIN) Liqd Take 236 mLs by mouth 2 (two) times daily. 8/16/17   Galindo Amor MD   furosemide (LASIX) 20 MG tablet Take 5 tablets (100 mg total) by mouth 2 (two) times daily. 1/26/18 1/26/19  Jacky Escalera MD   hydrALAZINE (APRESOLINE) 25 MG tablet Take 1 tablet (25 mg total) by mouth every 12 (twelve) hours. 1/11/18 1/11/19  SAM Huerta   labetalol (NORMODYNE) 200 MG tablet Take 4 tablets (800 mg total) by mouth every 8 (eight) hours. 1/26/18 1/26/19  Jacky Escalera MD   NIFEdipine (PROCARDIA-XL) 30 MG (OSM) 24 hr tablet Take 2 tablets (60 mg total) by mouth once daily. 1/26/18 1/26/19   "Jacky Escalera MD   ondansetron (ZOFRAN) 4 MG tablet Take 1 tablet (4 mg total) by mouth every 6 (six) hours. 1/7/18   Roslaes Liu MD   oxyCODONE (ROXICODONE) 5 MG immediate release tablet Take 1 tablet (5 mg total) by mouth every 4 (four) hours as needed for Pain. 1/18/18   Sohan Muprhy MD   predniSONE (DELTASONE) 1 MG tablet Take 1 mg by mouth every other day.    Historical Provider, MD   tacrolimus (PROGRAF) 1 MG Cap Take 5 capsules (5 mg total) by mouth every 12 (twelve) hours. 1/26/18 1/26/19  Jacky Escalera MD   triamcinolone acetonide 0.1% (KENALOG) 0.1 % ointment AAA on arms, legs, and neck bid x 1-2 wks then prn flares only  Patient taking differently: Apply to affected area(s) on arms, legs, and neck twice daily x 1-2 wks then as needed for flares only 12/31/14   Diana Grider MD          [facility] MEDICATIONS:     Scheduled Meds:    [START ON 2/17/2018] aspirin  325 mg Oral Daily    [START ON 2/17/2018] atorvastatin  40 mg Oral Daily    heparin (porcine)  5,000 Units Subcutaneous Q8H    [START ON 2/17/2018] polyethylene glycol  17 g Oral Daily    senna-docusate 8.6-50 mg  1 tablet Oral BID    sodium chloride 0.9%  3 mL Intravenous Q8H     Continuous Infusions:    nicardipine 2.5 mg/hr (02/16/18 2114)     PRN Meds: acetaminophen, bisacodyl, labetalol, lorazepam, mineral oil, nitroGLYCERIN, ondansetron, promethazine, ramelteon      PHYSICAL EXAM:     Wt Readings from Last 1 Encounters:   02/16/18 2117 53.4 kg (117 lb 11.6 oz)     Body mass index is 19.59 kg/m².  Vitals:    02/16/18 2100 02/16/18 2115 02/16/18 2117 02/16/18 2130   BP: (!) 235/143 (!) 241/149  (!) 162/78   Pulse: (!) 118 (!) 121  102   Resp: (!) 25 (!) 27  (!) 25   Temp: 97.8 °F (36.6 °C)      TempSrc: Oral      SpO2: 100% 100%  99%   Weight:   53.4 kg (117 lb 11.6 oz)    Height:   5' 5" (1.651 m)           -- General appearance: Chronically ill-appearing female who is lying in bed.  well developed. " appears stated age   -- Head: normocephalic, atraumatic   -- Eyes: conjunctivae clear. Extraocular muscles intact  -- Nose: Nares normal. Septum midline.   -- Mouth/Throat: lips, mucosa, and tongue normal. no throat erythema.   -- Neck: supple, symmetrical, trachea midline, no JVD and thyroid not grossly enlarged, appears symmetric  -- Lungs: clear to auscultation bilaterally.  Increased respiratory rate with normal respiratory effort. No use of accessory muscles.   -- Chest wall: no tenderness. equal bilateral chest rise   -- Heart: regular rate and regular rhythm. S1, S2 normal.  no click, rub or gallop   -- Abdomen: Well-healed abdominal surgical scars.  soft, non-tender, non-distended, non-tympanic; bowel sounds normal; no masses  -- Extremities: no cyanosis, clubbing or edema.  Dialysis  in right upper extremity.  -- Pulses: Bounding 3+ and symmetric   -- Skin: color normal, texture normal, turgor normal. No rashes or lesions.   -- Neurologic: + Generalized seizure activity lasted approximately 5 seconds and occurring every 5 minutes for 4 episodes.  Otherwise Normal strength and tone. No focal numbness or weakness. CNII-XII intact. [place] coma scale: eyes open spontaneously-3, oriented & converses-5, obeys commands-6.      LABORATORY STUDIES:     Recent Results (from the past 36 hour(s))   POCT CMP    Collection Time: 02/16/18  4:13 PM   Result Value Ref Range    Albumin, POC 2.9 (L) 3.3 - 5.5 g/dL    Alkaline Phosphatase,  (H) 42 - 141 U/L    ALT (SGPT), POC 40 10 - 47 U/L    AST (SGOT), POC 41 (H) 11 - 38 U/L    POC BUN 61 (H) 7 - 22 mg/dL    Calcium, POC 8.9 8.0 - 10.3 mg/dL    POC Chloride 96 (L) 98 - 108 mmol/L    POC Creatinine 11.8 (H) 0.6 - 1.2 mg/dL    POC Glucose 106 73 - 118 mg/dL    POC Potassium 3.8 3.6 - 5.1 mmol/L    POC Sodium 141 128 - 145 mmol/L    Bilirubin 0.5 0.2 - 1.6 mg/dL    POC TCO2 28 18 - 33 mmol/L    Protein 7.2 6.4 - 8.1 g/dL   Troponin ISTAT    Collection Time: 02/16/18   4:16 PM   Result Value Ref Range    POC Cardiac Troponin I 0.58 (H) <0.09 ng/mL    Sample MICHELL    POCT B-type natriuretic peptide (BNP)    Collection Time: 02/16/18  4:30 PM   Result Value Ref Range    POC B-Type Natriuretic Peptide 932 (H) 0.0 - 100.0 pg/mL   Protime-INR    Collection Time: 02/16/18  9:46 PM   Result Value Ref Range    Prothrombin Time 11.3 9.0 - 12.5 sec    INR 1.1 0.8 - 1.2       Lab Results   Component Value Date    INR 1.1 02/16/2018    INR 1.1 01/18/2018    INR 1.1 01/04/2018     Lab Results   Component Value Date    HGBA1C 4.5 01/24/2018         IMAGING:     Head CT without contrast  No acute abnormalities identified    CONSULTS:     IP CONSULT TO CARDIOLOGY   IP consult to neurology    ASSESSMENT & PLAN:     Primary Diagnosis:  Hypertensive crisis    Active Hospital Problems    Diagnosis  POA    *Hypertensive crisis [I16.9]  Yes     Priority: 1 - High    Elevated troponin [R74.8]  Yes     Priority: 2     Seizure in response to acute event [R56.9]  Yes    Immunosuppressed [D89.9]  Yes    Chronic rejection of liver transplant [T86.41]  Yes    Anemia in ESRD (end-stage renal disease) [N18.6, D63.1]  Yes     Chronic    Renovascular hypertension [I15.0]  Yes    ESRD on hemodialysis [N18.6, Z99.2]  Not Applicable     Chronic      Resolved Hospital Problems    Diagnosis Date Resolved POA   No resolved problems to display.     Hypertensive emergency with history of renovascular hypertension  · history of difficult to control hypertension   · Presents with a systolic blood pressure of approximately 225 mmHg.    · In order to decrease the risk of acute stroke, we will initiate blood pressure medications with a goal of a decrease of 30% in the next 24 hours.  · Thereafter, the goal while inpatient is a systolic blood pressure less than 130mmHg given elevated troponin level  · BP not in acceptable range at this time  · Initiate oral regimen as well as providing PRN IV medications  · Needed to  escalate to Cardene drip for tighter blood pressure control  · Continue current regimen    Elevated troponin  · Likely secondary to cardiac strain due to uncontrolled hypertension as noted above  · No evidence of ST elevation MI  · Aspirin given  · Monitored on telemetry  · Trending troponins  · Tight Blood pressure control as noted above    Seizure in response to acute event  · Patient was having frequent short lived seizure episode secondary to hypertensive crisis  ·     History of liver transplant with chronic rejection  · Currently on prednisone and Prograf  · Undergoing evaluation for rejection by GI; recent biopsy  · No acute issues    Chronic Immunosuppression  · Checking Prograf levels  · Currently on prednisone for liver transplant as noted above    End-stage renal disease on dialysis  · No acute issues with significant volume overload, electrolyte abnormality, or acid-base abnormality at this time  · Will need routine dialysis while inpatient  · Nephrology consulted    Anemia in ESRD  · No indication for acute transfusion at this time  · Monitor H&H            VTE Risk Mitigation         Ordered     heparin (porcine) injection 5,000 Units  Every 8 hours     Route:  Subcutaneous        02/16/18 2120     Medium Risk of VTE  Once      02/16/18 2120            Adult PRN medications available   DVT prophylaxis given       DISPOSITION:     Will admit to the Hospital Medicine service for further evaluation and treatment.    Chart reviewed and updated where applicable.    High Risk Conditions:  Patient has a condition that poses threat to life and bodily function: Hypertensive emergency      ===============================================================    Philip Sargent MD, MPH  Department of Hospital Medicine   [facility]  [phone] pg  (7pm - 6am)          This note is dictated using Dragon Medical 360 voice recognition software.  There are word recognition mistakes that are  occasionally missed on review.

## 2018-02-17 NOTE — EICU
Hypertensive emergency /[id number] with 3 witnessed seizures.  Resolved after 2 mg lorazepam and with BP control (nicardipine infusion and labetolol bolus).  Target -200.  CT head- no acute abnormality.    Sohan Marin MD

## 2018-02-17 NOTE — CONSULTS
27 y o AAM f with ESRD on home hemodialysis 4x week. Dialyzes under Dr Bass's care. Admitted with malignant htn, this is her second admit with similar complaints. In the ER she had GM seizures witnessed by treating RN.    The day of her admission she co headaches came to the ER and her bp was found to be @ 221/149 aggressively treated and stabilized transferred to telemetry for care.    Renal consult requested for follow up ESRD.     Pt states that she dialyzed last pm, she also stated that she takes her meds a prescribed and does not skip them. Tells me that she follow her diet and that she does not use recreational drugs.    Denies CNS ENT CP GI  RHEUM OR DERM SX  Past Medical History:   Diagnosis Date    Anemia in ESRD (end-stage renal disease) 10/12/2015    Chronic rejection of liver transplant 3/22/2016    ESRD on hemodialysis 9/30/2015    History of splenomegaly 4/12/2016    Immunosuppressed 8/5/2017    Iron deficiency anemia secondary to inadequate dietary iron intake 8/16/2017    She receives IV iron periodically at the Dialysis Center.   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)    Moderate protein-calorie malnutrition 8/16/2017    MRSA bacteremia 8/6/2017    Prophylactic immunotherapy 8/4/2014    Renovascular hypertension 10/2/2015    Secondary hyperparathyroidism 8/5/2017    Thrombocytopenia 4/12/2016     Review of patient's allergies indicates:   Allergen Reactions    Chloral hydrate      Other reaction(s): Hallucinations  Other reaction(s): Hives    Hydrocodone Other (See Comments)     Mental status changes     Social History     Social History    Marital status:      Spouse name: N/A    Number of children: N/A    Years of education: N/A     Social History Main Topics    Smoking status: Never Smoker    Smokeless tobacco: Never Used    Alcohol use No    Drug use: No    Sexual activity: Yes     Partners: Male     Other Topics Concern     Are You Pregnant Or Think You May Be? No    Breast-Feeding No     Social History Narrative    Lives 2 kids, 7 and 8, and nephew. Her mom helps with kids.     Family History   Problem Relation Age of Onset    Hypertension Mother     Hypertension Father     Melanoma Neg Hx          Current Facility-Administered Medications   Medication    acetaminophen tablet 650 mg    aspirin tablet 325 mg    atorvastatin tablet 40 mg    bisacodyl suppository 10 mg    cinacalcet tablet 30 mg    cloNIDine 0.2 mg/24 hr td ptwk 1 patch    famotidine tablet 20 mg    furosemide tablet 100 mg    heparin (porcine) injection 5,000 Units    hydrALAZINE tablet 25 mg    influenza (FLUZONE,FLUARIX QUADRIVALENT) vaccine 0.5 mL    labetalol injection 20 mg    labetalol tablet 800 mg    levETIRAcetam in NaCl (iso-os) IVPB 500 mg    lorazepam injection 2 mg    mineral oil enema 1 enema    NIFEdipine 24 hr tablet 60 mg    nitroGLYCERIN SL tablet 0.4 mg    ondansetron injection 8 mg    polyethylene glycol packet 17 g    predniSONE tablet 1 mg    promethazine suppository 25 mg    ramelteon tablet 8 mg    senna-docusate 8.6-50 mg per tablet 1 tablet    sodium chloride 0.9% flush 3 mL    tacrolimus capsule 5 mg       LABS    Recent Results (from the past 24 hour(s))   Phosphorus    Collection Time: 02/16/18  9:46 PM   Result Value Ref Range    Phosphorus 6.1 (H) 2.7 - 4.5 mg/dL   Troponin I    Collection Time: 02/16/18  9:46 PM   Result Value Ref Range    Troponin I 0.788 (H) 0.000 - 0.026 ng/mL   Protime-INR    Collection Time: 02/16/18  9:46 PM   Result Value Ref Range    Prothrombin Time 11.3 9.0 - 12.5 sec    INR 1.1 0.8 - 1.2   hCG, quantitative    Collection Time: 02/16/18  9:46 PM   Result Value Ref Range    hCG Quant 1.9 See Text mIU/mL   Comprehensive metabolic panel    Collection Time: 02/17/18  3:52 AM   Result Value Ref Range    Sodium 136 136 - 145 mmol/L    Potassium 3.9 3.5 - 5.1 mmol/L    Chloride 97 95 -  110 mmol/L    CO2 25 23 - 29 mmol/L    Glucose 99 70 - 110 mg/dL    BUN, Bld 66 (H) 6 - 20 mg/dL    Creatinine 12.3 (H) 0.5 - 1.4 mg/dL    Calcium 9.6 8.7 - 10.5 mg/dL    Total Protein 7.7 6.0 - 8.4 g/dL    Albumin 2.9 (L) 3.5 - 5.2 g/dL    Total Bilirubin 0.6 0.1 - 1.0 mg/dL    Alkaline Phosphatase 570 (H) 55 - 135 U/L    AST 29 10 - 40 U/L    ALT 33 10 - 44 U/L    Anion Gap 14 8 - 16 mmol/L    eGFR if African American 4 (A) >60 mL/min/1.73 m^2    eGFR if non African American 4 (A) >60 mL/min/1.73 m^2   Lipid panel    Collection Time: 02/17/18  3:52 AM   Result Value Ref Range    Cholesterol 245 (H) 120 - 199 mg/dL    Triglycerides 87 30 - 150 mg/dL    HDL 58 40 - 75 mg/dL    LDL Cholesterol 169.6 (H) 63.0 - 159.0 mg/dL    HDL/Chol Ratio 23.7 20.0 - 50.0 %    Total Cholesterol/HDL Ratio 4.2 2.0 - 5.0    Non-HDL Cholesterol 187 mg/dL   Magnesium    Collection Time: 02/17/18  3:52 AM   Result Value Ref Range    Magnesium 2.2 1.6 - 2.6 mg/dL   Troponin I    Collection Time: 02/17/18  3:52 AM   Result Value Ref Range    Troponin I 0.676 (H) 0.000 - 0.026 ng/mL   Troponin I    Collection Time: 02/17/18  8:37 AM   Result Value Ref Range    Troponin I 0.784 (H) 0.000 - 0.026 ng/mL   2D echo with color flow doppler    Collection Time: 02/17/18 12:02 PM   Result Value Ref Range    EF 65 55 - 65    Diastolic Dysfunction Yes (A)     Est. PA Systolic Pressure 44.24 (A)     Pericardial Effusion SMALL (A)     Mitral Valve Mobility NORMAL     Tricuspid Valve Regurgitation TRIVIAL    Troponin I    Collection Time: 02/17/18 12:06 PM   Result Value Ref Range    Troponin I 0.895 (H) 0.000 - 0.026 ng/mL   ]    I/O last 3 completed shifts:  In: 362.5 [I.V.:262.5; IV Piggyback:100]  Out: -     Vitals:    02/17/18 1250 02/17/18 1310 02/17/18 1348 02/17/18 1602   BP: (!) 159/102 (!) 153/94 (!) 154/88 (!) 148/91   Pulse: 97 101 94 95   Resp: (!) 23 20 18   Temp:   97.7 °F (36.5 °C) 98.7 °F (37.1 °C)   TempSrc:   Oral Oral   SpO2:   99%  100%   Weight:       Height:           No Jvd, Thyromegaly or Lymphadenopathy  Lungs: Fairly clear anteriorly and laterally  Cor: RRR no G or rubs  Abd: Soft benign good bowel sounds non tender  Ext: No E C C L forearm avf with good P-B-T    A)  Malignant HTN, caused by ? Missing meds  Home hemod 4 x week creat of 12.3 suggest maybe compliance issues or avf issues  HTN  Seizures  Anemia of ckd with lowish MCV Iron studies 1/4/18 ok  2nd hypeprth on cinacalcet  Thrombocytopenia  Liver disease s/p liver transplant  Recurrent ascites that need tapping   Low albumin  Elevated trop1  Pulmonary htn (44   2/17/2018)  Maybe a degree of anxiety and depression    Recom:    Renal diet  Home meds  Protect access  Hemod if she is still in the hospital on [place]  Consider psych eval and maybe adding antidepressants

## 2018-02-17 NOTE — PROGRESS NOTES
"[facility] Hospital Medicine  Progress Note    Patient Name: Holly Patel  MRN: [id number]  Patient Class: IP- Inpatient   Admission Date: 2/16/2018  Length of Stay: 1 days  Attending Physician: Jannet Maharaj MD  Primary Care Provider: Stan Sosa MD        Subjective:     Principal Problem:Hypertensive crisis    HPI:  Holly Patel is a 27 y.o. female that (in part)  has a past medical history of Anemia in ESRD (end-stage renal disease); Chronic rejection of liver transplant; ESRD on hemodialysis; History of splenomegaly; Immunosuppressed; Iron deficiency anemia secondary to inadequate dietary iron intake; Liver replaced by transplant; Moderate protein-calorie malnutrition; MRSA bacteremia; Prophylactic immunotherapy; Renovascular hypertension; Secondary hyperparathyroidism; and Thrombocytopenia. Presents to [facility] Emergency Department Complaining of elevated blood pressure.  Associated symptoms include a headache over the right temporal region with acute onset at approximately 2 PM today while doing home dialysis.  She also has associated mild blurry vision.  Her blood pressure was markedly elevated at home and when she arrived emergency department for systolic blood pressure was as high as 230.  She had removed a clonidine patch earlier in the day and did not promote any one.  She normally does dialysis for approximately 2 hours but to dialysis for approximately 30 minutes and stopped due to the symptoms noted above.  She reports being compliant normally with her outpatient medication regimen which includes a clonidine patch, torsemide, labetalol, nifedipine, and hydralazine.  She also takes Prograf and steroids for liver transplant.  She denies peripheral edema, chest pain, shortness of breath, or dyspnea with exertion.  No diaphoresis or paresthesias.  There is report that her blood pressure is "always elevated".  Generalized location.  " Generalized radiation.  Severe intensity.  Recurrent frequency.  Constant duration.    In the emergency department routine laboratory studies, cardiac enzymes, chest x-ray, EKG were obtained.  She was given hydralazine IV for the elevated blood pressure which did not show any improvement.  She was then given an additional dose of hydralazine and Nitropaste was placed on her chest wall, again without significant improvement.  She was given Lasix and then started on a Tridil drip.  She also received Valium and Fioricet.  It was noted that she had elevated troponin level and some evidence of mild respiratory failure on chest x-ray.  Original referral center notified for transfer to [facility].  Shortly after arrival she began having seizures lasting approximately 5 seconds and occurring every 5 minutes for approximately 4 episodes.  Seizures have been responsive to Ativan and initiation of Cardene drip.      Hospital medicine has been asked to admit for further evaluation and treatment.     Hospital Course:  Ms. Patel was admitted for HTN crisis with new seizure activity. Pt was admitted to the ICU and placed on cardene gtt and home medication resumed. Head CT was unremarkable for any acute abnormality. Her workup was remarkable for elevated troponin. Pt started on Keppra. Cardiology, Nephrology and Neurology consulted.    Interval History: Pt reports she is hungry. Pt weaned off cardene gtt overnight.    Review of Systems   Constitutional: Negative for chills and fever.   Respiratory: Negative for shortness of breath.    Cardiovascular: Negative for chest pain.     Objective:     Vital Signs (Most Recent):  Temp: 98.7 °F (37.1 °C) (02/17/18 1602)  Pulse: 95 (02/17/18 1602)  Resp: 18 (02/17/18 1602)  BP: (!) 148/91 (02/17/18 1602)  SpO2: 100 % (02/17/18 1602) Vital Signs (24h Range):  Temp:  [97.7 °F (36.5 °C)-98.7 °F (37.1 °C)] 98.7 °F (37.1 °C)  Pulse:  [] 95  Resp:  [18-76] 18  SpO2:  [93 %-100 %] 100  %  BP: (124-241)/() 148/91     Weight: 53.4 kg (117 lb 11.6 oz)  Body mass index is 22.99 kg/m².    Intake/Output Summary (Last 24 hours) at 02/17/18 1707  Last data filed at 02/17/18 1000   Gross per 24 hour   Intake           469.71 ml   Output                0 ml   Net           469.71 ml      Physical Exam   Constitutional: She appears well-developed and well-nourished. No distress.   HENT:   Head: Normocephalic and atraumatic.   Eyes: EOM are normal. Pupils are equal, round, and reactive to light.   Neck: Normal range of motion. Neck supple.   Cardiovascular: Normal rate and regular rhythm.    Pulmonary/Chest: Effort normal and breath sounds normal.   Abdominal: Soft. Bowel sounds are normal.   Musculoskeletal: Normal range of motion.   Neurological:   Sleeping but arousable, oriented x 3   Skin: Skin is warm and dry. She is not diaphoretic.       Significant Labs: All pertinent labs within the past 24 hours have been reviewed.    Significant Imaging: I have reviewed and interpreted all pertinent imaging results/findings within the past 24 hours.    Assessment/Plan:      * Hypertensive crisis    Pt with BP that did not respond to multiple IV pushes in the ED  Placed on cardene gtt that has now been weaned off  Pt resumed on home regimen   BP stable and controlled on PO regimen  Stable for transfer to the floor  Non-compliance to medication regimen the likely culprit in this case          Seizure in response to acute event    Likely due to HTN crisis  On Keppra for now, but will not likely need upon discharge  Neurology following   Seizure precautions        Elevated troponin    Trended markers  Likely demand ischemia per Cards in setting of uncontrolled HTN and ESRD  Monitor on telemetry  Workup with ECHO pending  Possible further workup per Cards        Immunosuppressed    Continue tacrolimus and prednisone        Chronic rejection of liver transplant    Continue immunosuppression  Will check tacrolimus  level in am  Pt will need to have outpatient liver biopsy scheduled        Anemia in ESRD (end-stage renal disease)    H/H consistent with previous levels  No signs of bleeding  Will monitor        Renovascular hypertension    Long h/o poorly controlled HTN  Non-compliance an issue  As discussed above        ESRD on hemodialysis    Pt does home dialysis 3x/week  Nephrology consulted for HD during hospital stay          VTE Risk Mitigation         Ordered     heparin (porcine) injection 5,000 Units  Every 8 hours     Route:  Subcutaneous        02/16/18 2120     Medium Risk of VTE  Once      02/16/18 2120          Critical care time spent on the evaluation and treatment of severe organ dysfunction, review of pertinent labs and imaging studies, discussions with consulting providers and discussions with patient/family: 35 minutes.    Jannet Maharaj MD  Department of Hospital Medicine   [facility]

## 2018-02-17 NOTE — NURSING
"OK to bring the pt to CT without urine pregnancy test per MD Sargent. Pt is on hemo dialysis and has her "tubes tied in 2010"  "

## 2018-02-17 NOTE — NURSING
2100 Pt starting to have seizures X 3 witnessed right after EMS brought her in. HD=062/143. No any orders on MAR. Called Tray and MD Sargent. See new orders.     2254 Seizure activity noted again. Ativan given

## 2018-02-17 NOTE — HOSPITAL COURSE
Ms. Patel was admitted for HTN crisis with new seizure activity. Pt was admitted to the ICU and placed on cardene gtt and home medication resumed. Head CT was unremarkable for any acute abnormality. Her workup was remarkable for elevated troponin. Pt started on Keppra. Cardiology, Nephrology and Neurology consulted. Echo showed EF 65%, G2DD, PAP 44, and a small pericardial effusion. Stress 2/19/2018 free of perfusion defect. Blood pressure much better controlled with medication compliance. She was transferred to Telemetry and remains stable.She will benefit from a home health skilled nurse to assist with medication compliance.which has been arranged at DC time, Her mother states that to patient will move in with her after discharge so that she can assist with medication compliance. The patient exhibits little insight into the importance of following her regimen strictly. Education and counseling given at length from doctors and nursing staff.  She was seizure free in last 48 hours and EEG show no seizure activity,seizure likely duo to HTN encephalopathy.  She had her routine HD by nephrology.  Patient has been discharged home with follow up with PCP in next few days.

## 2018-02-17 NOTE — NURSING
Patient arrived to floor via wheel chair from ICU in stable condition.  Chart sent with patient. Visualized patient and assessed patient's overall condition and appearance. No complaints. NAD noted. Family at bedside. Will continue to monitor.

## 2018-02-17 NOTE — ASSESSMENT & PLAN NOTE
Likely due to HTN crisis  On Keppra for now, but will not likely need upon discharge  Neurology following   Seizure precautions

## 2018-02-17 NOTE — ASSESSMENT & PLAN NOTE
Continue immunosuppression  Will check tacrolimus level in am  Pt will need to have outpatient liver biopsy scheduled

## 2018-02-17 NOTE — SUBJECTIVE & OBJECTIVE
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 ()    Moderate protein-calorie malnutrition 2017    MRSA bacteremia 2017    Prophylactic immunotherapy 2014    Renovascular hypertension 10/2/2015    Secondary hyperparathyroidism 2017    Thrombocytopenia 2016       Past Surgical History:   Procedure Laterality Date     SECTION      2     KIDNEY TRANSPLANT      LIVER BIOPSY      LIVER TRANSPLANT  1992    TUBAL LIGATION         Review of patient's allergies indicates:   Allergen Reactions    Chloral hydrate      Other reaction(s): Hallucinations  Other reaction(s): Hives    Hydrocodone Other (See Comments)     Mental status changes       Current Facility-Administered Medications on File Prior to Encounter   Medication    [COMPLETED] butalbital-acetaminophen-caffeine -40 mg per tablet 1 tablet    [COMPLETED] diazePAM tablet 10 mg    [COMPLETED] furosemide injection 60 mg    [COMPLETED] hydrALAZINE injection 20 mg    [COMPLETED] hydrALAZINE injection 20 mg    [COMPLETED] hydrOXYzine pamoate capsule 25 mg    [COMPLETED] nitroGLYCERIN 2% TD oint ointment 1 inch    [DISCONTINUED] nitroGLYCERIN 50 mg in dextrose 5 % 250 mL infusion     Current Outpatient Prescriptions on File Prior to Encounter   Medication Sig    cinacalcet (SENSIPAR) 30 MG Tab Take 1 tablet (30 mg total) by mouth daily with breakfast.    cloNIDine 0.2 mg/24 hr td ptwk (CATAPRES) 0.2 mg/24 hr Place 1 patch onto the skin every 7 days. Change every Friday    famotidine (PEPCID) 20 MG  tablet Take 1 tablet (20 mg total) by mouth 2 (two) times daily.    food supplemt, lactose-reduced (ENSURE ACTIVE HIGH PROTEIN) Liqd Take 236 mLs by mouth 2 (two) times daily.    furosemide (LASIX) 20 MG tablet Take 5 tablets (100 mg total) by mouth 2 (two) times daily.    hydrALAZINE (APRESOLINE) 25 MG tablet Take 1 tablet (25 mg total) by mouth every 12 (twelve) hours.    labetalol (NORMODYNE) 200 MG tablet Take 4 tablets (800 mg total) by mouth every 8 (eight) hours.    NIFEdipine (PROCARDIA-XL) 30 MG (OSM) 24 hr tablet Take 2 tablets (60 mg total) by mouth once daily.    ondansetron (ZOFRAN) 4 MG tablet Take 1 tablet (4 mg total) by mouth every 6 (six) hours.    oxyCODONE (ROXICODONE) 5 MG immediate release tablet Take 1 tablet (5 mg total) by mouth every 4 (four) hours as needed for Pain.    predniSONE (DELTASONE) 1 MG tablet Take 1 mg by mouth every other day.    tacrolimus (PROGRAF) 1 MG Cap Take 5 capsules (5 mg total) by mouth every 12 (twelve) hours.    triamcinolone acetonide 0.1% (KENALOG) 0.1 % ointment AAA on arms, legs, and neck bid x 1-2 wks then prn flares only (Patient taking differently: Apply to affected area(s) on arms, legs, and neck twice daily x 1-2 wks then as needed for flares only)     Family History     Problem Relation (Age of Onset)    Hypertension Mother, Father        Social History Main Topics    Smoking status: Never Smoker    Smokeless tobacco: Never Used    Alcohol use No    Drug use: No    Sexual activity: Yes     Partners: Male     Review of Systems   Unable to perform ROS: mental status change     Objective:     Vital Signs (Most Recent):  Temp: 97.9 °F (36.6 °C) (02/17/18 0715)  Pulse: 92 (02/17/18 0830)  Resp: (!) 33 (02/17/18 0830)  BP: 124/67 (02/17/18 0800)  SpO2: 98 % (02/17/18 0830) Vital Signs (24h Range):  Temp:  [97.7 °F (36.5 °C)-98.5 °F (36.9 °C)] 97.9 °F (36.6 °C)  Pulse:  [] 92  Resp:  [18-76] 33  SpO2:  [73 %-100 %] 98 %  BP:  (124-241)/() 124/67     Weight: 53.4 kg (117 lb 11.6 oz)  Body mass index is 22.99 kg/m².    SpO2: 98 %  O2 Device (Oxygen Therapy): room air      Intake/Output Summary (Last 24 hours) at 02/17/18 0947  Last data filed at 02/17/18 0858   Gross per 24 hour   Intake           419.71 ml   Output                0 ml   Net           419.71 ml       Lines/Drains/Airways     Drain                 Hemodialysis AV Fistula Left forearm -- days          Peripheral Intravenous Line                 Peripheral IV - Single Lumen 02/16/18 1646 Right Hand less than 1 day         Peripheral IV - Single Lumen 02/16/18 2115 Right Antecubital less than 1 day                Physical Exam   Constitutional: She appears well-developed and well-nourished. No distress.   HENT:   Head: Normocephalic and atraumatic.   Eyes: Conjunctivae and EOM are normal. Pupils are equal, round, and reactive to light. No scleral icterus.   Neck: Normal range of motion. No JVD present. No tracheal deviation present. No thyromegaly present.   Cardiovascular: Normal rate, regular rhythm, S1 normal and S2 normal.  Exam reveals no gallop and no friction rub.    No murmur heard.  Pulmonary/Chest: Effort normal and breath sounds normal. No respiratory distress. She has no wheezes.   Abdominal: Soft. She exhibits no distension.   Musculoskeletal: She exhibits no edema.   Neurological: No cranial nerve deficit.   Dec MS   Skin: Skin is warm. She is not diaphoretic. No erythema.   Psychiatric:   UTO       Current Medications:   aspirin  325 mg Oral Daily    atorvastatin  40 mg Oral Daily    cinacalcet  30 mg Oral Daily with breakfast    cloNIDine 0.2 mg/24 hr td ptwk  1 patch Transdermal Q7 Days    famotidine  20 mg Oral Daily    furosemide  100 mg Oral BID    heparin (porcine)  5,000 Units Subcutaneous Q8H    hydrALAZINE  25 mg Oral Q12H    labetalol  800 mg Oral Q8H    levetiracetam IVPB  1,000 mg Intravenous Q12H    NIFEdipine  60 mg Oral Daily     polyethylene glycol  17 g Oral Daily    predniSONE  1 mg Oral Every other day    senna-docusate 8.6-50 mg  1 tablet Oral BID    sodium chloride 0.9%  3 mL Intravenous Q8H    tacrolimus  5 mg Oral BID      nicardipine Stopped (02/17/18 0716)     acetaminophen, bisacodyl, influenza, labetalol, lorazepam, mineral oil, nitroGLYCERIN, ondansetron, promethazine, ramelteon    Laboratory:  CBC:    Recent Labs  Lab 01/26/18  0418 01/29/18  1404 02/05/18  0930   WHITE BLOOD CELL COUNT 3.09 L 3.34 L 4.06   HEMOGLOBIN 9.2 L 8.8 L 10.0 L   HEMATOCRIT 28.3 L 27.9 L 30.8 L   PLATELETS 81 L 72 L 65 L       CHEMISTRIES:    Recent Labs  Lab 01/25/18  0424 01/26/18  0418 01/29/18  1404 02/05/18  0930 02/17/18  0352   GLUCOSE 99 84 163 H 90 99   SODIUM 136 136 137 138 136   POTASSIUM 4.4 4.6 4.8 4.9 3.9   BUN BLD 30 H 34 H 32 H 62 H 66 H   CREATININE 9.5 H 11.8 H 11.9 H 13.9 H 12.3 H   EGFR IF  5.9 A 4.5 A 4.5 A 3.7 A 4 A   EGFR IF NON- 5.1 A 3.9 A 3.9 A 3.2 A 4 A   CALCIUM 8.7 6.8 LL 8.1 L 8.8 9.6   MAGNESIUM 1.8 1.8  --   --  2.2       CARDIAC BIOMARKERS:    Recent Labs  Lab 03/17/15  1152  01/29/18  1404 02/16/18  2146 02/17/18  0352   CPK 52  --   --   --   --    TROPONIN I  --   < > 0.016 0.788 H 0.676 H   < > = values in this interval not displayed.    COAGS:    Recent Labs  Lab 01/04/18  0444 01/18/18  1618 02/16/18  2146   INR 1.1 1.1 1.1       LIPIDS/LFTS:    Recent Labs  Lab 01/29/18  1404 02/05/18  0930 02/17/18  0352   CHOLESTEROL  --   --  245 H   TRIGLYCERIDES  --   --  87   HDL  --   --  58   LDL CHOLESTEROL  --   --  169.6 H   NON-HDL CHOLESTEROL  --   --  187   AST 36 35 29   ALT 21 32 33     Lab Results   Component Value Date    TSH 2.875 12/22/2017           Diagnostic Results:  ECG (personally reviewed tracings):   2/16/18 1508 , LVH  2/16/18 2349 , LVH, lat ST depression, ?isch vs strain pattern    Chest X-Ray (personally reviewed image(s)): 2/16/18 cmeg, mild  CHF    Echo: 9/5/17 (repeat pending, prelim: LVH, normal LVEF)    1 - Normal left ventricular systolic function (EF 55-60%).     2 - Concentric hypertrophy.     3 - Impaired LV relaxation, elevated LAP (grade 2 diastolic dysfunction).     4 - Trivial aortic regurgitation.     5 - Mild to moderate mitral regurgitation.     6 - Trivial to mild tricuspid regurgitation.     7 - Trivial pulmonic regurgitation.

## 2018-02-17 NOTE — PLAN OF CARE
Problem: Patient Care Overview  Goal: Plan of Care Review  Outcome: Ongoing (interventions implemented as appropriate)  Pt admitted last night from [facility] for Hypertensive crisis. Now on Cardene gtt @ 1 mg/hr to keep SBP <200. Witnessed seizures last night, MD aware. Neurology consult in place. Elevated Troponin at 0.788 and 0.676. ST on 12 lead EKG. Cardiology consult in place. Keppra 1000mg q 12 hr started. Anuric on home dialysis. Unable to complete dialysis yesterday per pt. Afebrile. Other VSS. Free from falls or new injuries. NADN.

## 2018-02-17 NOTE — ASSESSMENT & PLAN NOTE
Likely strain in setting of HTN crisis +/- ESRD, doubt ACS  Will plan eventual isch eval pending clinical course

## 2018-02-17 NOTE — CONSULTS
[facility]  Neurology  Consult Note    Patient Name: Holly Patel  MRN: [id number]  Admission Date: 2018  Hospital Length of Stay: 1 days  Code Status: Full Code   Attending Provider: Toney Maharaj MD   Consulting Provider: Magdy Casillas MD  Primary Care Physician: Stan Sosa MD  Principal Problem:Hypertensive crisis    Inpatient consult to Neurology  Consult performed by: MAGDY CASILLAS  Consult ordered by: TONEY MAHARAJ        Subjective:     Chief Complaint: Seizure     HPI: 26 y/o female with medical Hx as listed below comes to ED in hypertensive crisis. While in ICU pt had three reported seizures. A that time her BP was 240/140's. Labetalol given and put on nicardipne infusion. No other seizures after. Pt has no recollection of events.    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 ()    Moderate protein-calorie malnutrition 2017    MRSA bacteremia 2017    Prophylactic immunotherapy 2014    Renovascular hypertension 10/2/2015    Secondary hyperparathyroidism 2017    Thrombocytopenia 2016       Past Surgical History:   Procedure Laterality Date     SECTION      2     KIDNEY TRANSPLANT      LIVER BIOPSY      LIVER TRANSPLANT  1992    TUBAL LIGATION         Review of patient's allergies indicates:   Allergen Reactions    Chloral hydrate      Other reaction(s): Hallucinations  Other reaction(s): Hives    Hydrocodone Other (See Comments)     Mental status changes       Current Neurological Medications:     Current  Facility-Administered Medications on File Prior to Encounter   Medication    [COMPLETED] butalbital-acetaminophen-caffeine -40 mg per tablet 1 tablet    [COMPLETED] diazePAM tablet 10 mg    [COMPLETED] furosemide injection 60 mg    [COMPLETED] hydrALAZINE injection 20 mg    [COMPLETED] hydrALAZINE injection 20 mg    [COMPLETED] hydrOXYzine pamoate capsule 25 mg    [COMPLETED] nitroGLYCERIN 2% TD oint ointment 1 inch    [DISCONTINUED] nitroGLYCERIN 50 mg in dextrose 5 % 250 mL infusion     Current Outpatient Prescriptions on File Prior to Encounter   Medication Sig    cinacalcet (SENSIPAR) 30 MG Tab Take 1 tablet (30 mg total) by mouth daily with breakfast.    cloNIDine 0.2 mg/24 hr td ptwk (CATAPRES) 0.2 mg/24 hr Place 1 patch onto the skin every 7 days. Change every Friday    famotidine (PEPCID) 20 MG tablet Take 1 tablet (20 mg total) by mouth 2 (two) times daily.    food supplemt, lactose-reduced (ENSURE ACTIVE HIGH PROTEIN) Liqd Take 236 mLs by mouth 2 (two) times daily.    furosemide (LASIX) 20 MG tablet Take 5 tablets (100 mg total) by mouth 2 (two) times daily.    hydrALAZINE (APRESOLINE) 25 MG tablet Take 1 tablet (25 mg total) by mouth every 12 (twelve) hours.    labetalol (NORMODYNE) 200 MG tablet Take 4 tablets (800 mg total) by mouth every 8 (eight) hours.    NIFEdipine (PROCARDIA-XL) 30 MG (OSM) 24 hr tablet Take 2 tablets (60 mg total) by mouth once daily.    ondansetron (ZOFRAN) 4 MG tablet Take 1 tablet (4 mg total) by mouth every 6 (six) hours.    oxyCODONE (ROXICODONE) 5 MG immediate release tablet Take 1 tablet (5 mg total) by mouth every 4 (four) hours as needed for Pain.    predniSONE (DELTASONE) 1 MG tablet Take 1 mg by mouth every other day.    tacrolimus (PROGRAF) 1 MG Cap Take 5 capsules (5 mg total) by mouth every 12 (twelve) hours.    triamcinolone acetonide 0.1% (KENALOG) 0.1 % ointment AAA on arms, legs, and neck bid x 1-2 wks then prn flares only (Patient  taking differently: Apply to affected area(s) on arms, legs, and neck twice daily x 1-2 wks then as needed for flares only)      Family History     Problem Relation (Age of Onset)    Hypertension Mother, Father        Social History Main Topics    Smoking status: Never Smoker    Smokeless tobacco: Never Used    Alcohol use No    Drug use: No    Sexual activity: Yes     Partners: Male     Review of Systems   Constitutional: Negative for fever.   HENT: Negative for trouble swallowing.    Eyes: Negative for photophobia.   Respiratory: Negative for shortness of breath.    Cardiovascular: Negative for chest pain.   Gastrointestinal: Negative for abdominal pain.   Genitourinary: Negative for dysuria.   Musculoskeletal: Negative for back pain.   Neurological: Negative for headaches.   Psychiatric/Behavioral: Negative for hallucinations.          Objective:     Vital Signs (Most Recent):  Temp: 97.9 °F (36.6 °C) (02/17/18 0715)  Pulse: 95 (02/17/18 1030)  Resp: (!) 63 (02/17/18 1030)  BP: (!) 152/93 (02/17/18 1000)  SpO2: 99 % (02/17/18 1030) Vital Signs (24h Range):  Temp:  [97.7 °F (36.5 °C)-98.5 °F (36.9 °C)] 97.9 °F (36.6 °C)  Pulse:  [] 95  Resp:  [18-76] 63  SpO2:  [73 %-100 %] 99 %  BP: (124-241)/() 152/93     Weight: 53.4 kg (117 lb 11.6 oz)  Body mass index is 22.99 kg/m².    Physical Exam   Constitutional: She is oriented to person, place, and time. No distress.   HENT:   Head: Normocephalic.   Eyes: Right eye exhibits no discharge. Left eye exhibits no discharge.   Neck: Normal range of motion.   Cardiovascular: Normal rate.    Pulmonary/Chest: Breath sounds normal.   Abdominal: Bowel sounds are normal.   Musculoskeletal: She exhibits no deformity.   Neurological: She is oriented to person, place, and time. She has normal strength.   Psychiatric: Her speech is normal.       NEUROLOGICAL EXAMINATION:     MENTAL STATUS   Oriented to person, place, and time.   Speech: speech is normal   Level of  consciousness: alert    CRANIAL NERVES     CN III, IV, VI   Right pupil: Size: 2 mm. Shape: regular.   Left pupil: Size: 2 mm. Shape: regular.   Nystagmus: none   Ophthalmoparesis: none    CN XII   Tongue deviation: none    MOTOR EXAM     Strength   Strength 5/5 throughout.     GAIT AND COORDINATION     Tremor   Resting tremor: absent      Significant Labs:   CBC: No results for input(s): WBC, HGB, HCT, PLT in the last 48 hours.  CMP:   Recent Labs  Lab 02/17/18  0352   GLU 99      K 3.9   CL 97   CO2 25   BUN 66*   CREATININE 12.3*   CALCIUM 9.6   MG 2.2   PROT 7.7   ALBUMIN 2.9*   BILITOT 0.6   ALKPHOS 570*   AST 29   ALT 33   ANIONGAP 14   EGFRNONAA 4*       Significant Imaging:  Head CT  No acute intracranial abnormalities identified.      Electronically signed by: SAGE DONATO MD  Date: 02/16/18  Time: 23:29     Assessment and Plan:     26 y/o female consulted for new-onset seizures    1. Seizures: likely due to marked hypertension.    Put on nicardipine infusion. BP better range today.   -Will continue levetiracetam 500 mg twice daily for now but likely no need to continue after D/C home unless pt has other seizure episodes that are unprovoked.   -Seizure precautions.    Active Diagnoses:    Diagnosis Date Noted POA    PRINCIPAL PROBLEM:  Hypertensive crisis [I16.9] 02/16/2018 Yes    Elevated troponin [R74.8] 02/16/2018 Yes    Seizure in response to acute event [R56.9] 02/16/2018 Yes    Immunosuppressed [D89.9] 08/05/2017 Yes    Chronic rejection of liver transplant [T86.41] 03/22/2016 Yes    Anemia in ESRD (end-stage renal disease) [N18.6, D63.1] 10/12/2015 Yes     Chronic    Renovascular hypertension [I15.0] 10/02/2015 Yes    ESRD on hemodialysis [N18.6, Z99.2] 09/30/2015 Not Applicable     Chronic      Problems Resolved During this Admission:    Diagnosis Date Noted Date Resolved POA       VTE Risk Mitigation         Ordered     heparin (porcine) injection 5,000 Units  Every 8 hours      Route:  Subcutaneous        02/16/18 2120     Medium Risk of VTE  Once      02/16/18 2120          Thank you for your consult. I will follow-up with patient. Please contact us if you have any additional questions.    Magdy Harper MD  Neurology  [facility]

## 2018-02-17 NOTE — HPI
"Holly Patel is a 27 y.o. female that (in part)  has a past medical history of Anemia in ESRD (end-stage renal disease); Chronic rejection of liver transplant; ESRD on hemodialysis; History of splenomegaly; Immunosuppressed; Iron deficiency anemia secondary to inadequate dietary iron intake; Liver replaced by transplant; Moderate protein-calorie malnutrition; MRSA bacteremia; Prophylactic immunotherapy; Renovascular hypertension; Secondary hyperparathyroidism; and Thrombocytopenia. Presents to [facility] Emergency Department Complaining of elevated blood pressure.  Associated symptoms include a headache over the right temporal region with acute onset at approximately 2 PM today while doing home dialysis.  She also has associated mild blurry vision.  Her blood pressure was markedly elevated at home and when she arrived emergency department for systolic blood pressure was as high as 230.  She had removed a clonidine patch earlier in the day and did not promote any one.  She normally does dialysis for approximately 2 hours but to dialysis for approximately 30 minutes and stopped due to the symptoms noted above.  She reports being compliant normally with her outpatient medication regimen which includes a clonidine patch, torsemide, labetalol, nifedipine, and hydralazine.  She also takes Prograf and steroids for liver transplant.  She denies peripheral edema, chest pain, shortness of breath, or dyspnea with exertion.  No diaphoresis or paresthesias.  There is report that her blood pressure is "always elevated".  Generalized location.  Generalized radiation.  Severe intensity.  Recurrent frequency.  Constant duration.    In the emergency department routine laboratory studies, cardiac enzymes, chest x-ray, EKG were obtained.  She was given hydralazine IV for the elevated blood pressure which did not show any improvement.  She was then given an additional dose of hydralazine and Nitropaste " was placed on her chest wall, again without significant improvement.  She was given Lasix and then started on a Tridil drip.  She also received Valium and Fioricet.  It was noted that she had elevated troponin level and some evidence of mild respiratory failure on chest x-ray.  Original referral center notified for transfer to [facility].  Shortly after arrival she began having seizures lasting approximately 5 seconds and occurring every 5 minutes for approximately 4 episodes.  Seizures have been responsive to Ativan and initiation of Cardene drip.      Hospital medicine has been asked to admit for further evaluation and treatment.

## 2018-02-17 NOTE — SUBJECTIVE & OBJECTIVE
Interval History: Pt reports she is hungry. Pt weaned off cardene gtt overnight.    Review of Systems   Constitutional: Negative for chills and fever.   Respiratory: Negative for shortness of breath.    Cardiovascular: Negative for chest pain.     Objective:     Vital Signs (Most Recent):  Temp: 98.7 °F (37.1 °C) (02/17/18 1602)  Pulse: 95 (02/17/18 1602)  Resp: 18 (02/17/18 1602)  BP: (!) 148/91 (02/17/18 1602)  SpO2: 100 % (02/17/18 1602) Vital Signs (24h Range):  Temp:  [97.7 °F (36.5 °C)-98.7 °F (37.1 °C)] 98.7 °F (37.1 °C)  Pulse:  [] 95  Resp:  [18-76] 18  SpO2:  [93 %-100 %] 100 %  BP: (124-241)/() 148/91     Weight: 53.4 kg (117 lb 11.6 oz)  Body mass index is 22.99 kg/m².    Intake/Output Summary (Last 24 hours) at 02/17/18 1707  Last data filed at 02/17/18 1000   Gross per 24 hour   Intake           469.71 ml   Output                0 ml   Net           469.71 ml      Physical Exam   Constitutional: She appears well-developed and well-nourished. No distress.   HENT:   Head: Normocephalic and atraumatic.   Eyes: EOM are normal. Pupils are equal, round, and reactive to light.   Neck: Normal range of motion. Neck supple.   Cardiovascular: Normal rate and regular rhythm.    Pulmonary/Chest: Effort normal and breath sounds normal.   Abdominal: Soft. Bowel sounds are normal.   Musculoskeletal: Normal range of motion.   Neurological:   Sleeping but arousable, oriented x 3   Skin: Skin is warm and dry. She is not diaphoretic.       Significant Labs: All pertinent labs within the past 24 hours have been reviewed.    Significant Imaging: I have reviewed and interpreted all pertinent imaging results/findings within the past 24 hours.

## 2018-02-18 PROBLEM — D84.9 IMMUNOSUPPRESSION: Chronic | Status: ACTIVE | Noted: 2017-08-05

## 2018-02-18 LAB
ALBUMIN SERPL BCP-MCNC: 2.4 G/DL
ALP SERPL-CCNC: 480 U/L
ALT SERPL W/O P-5'-P-CCNC: 23 U/L
ANION GAP SERPL CALC-SCNC: 14 MMOL/L
AST SERPL-CCNC: 22 U/L
BILIRUB SERPL-MCNC: 0.4 MG/DL
BUN SERPL-MCNC: 79 MG/DL
CALCIUM SERPL-MCNC: 8.2 MG/DL
CHLORIDE SERPL-SCNC: 101 MMOL/L
CO2 SERPL-SCNC: 23 MMOL/L
CREAT SERPL-MCNC: 14.1 MG/DL
EST. GFR  (AFRICAN AMERICAN): 4 ML/MIN/1.73 M^2
EST. GFR  (NON AFRICAN AMERICAN): 3 ML/MIN/1.73 M^2
GLUCOSE SERPL-MCNC: 95 MG/DL
MAGNESIUM SERPL-MCNC: 2.1 MG/DL
POTASSIUM SERPL-SCNC: 4.1 MMOL/L
PROT SERPL-MCNC: 6.3 G/DL
SODIUM SERPL-SCNC: 138 MMOL/L
TACROLIMUS BLD-MCNC: <1.5 NG/ML
TROPONIN I SERPL DL<=0.01 NG/ML-MCNC: 0.86 NG/ML

## 2018-02-18 PROCEDURE — 25000003 PHARM REV CODE 250: Performed by: HOSPITALIST

## 2018-02-18 PROCEDURE — 63600175 PHARM REV CODE 636 W HCPCS: Performed by: PSYCHIATRY & NEUROLOGY

## 2018-02-18 PROCEDURE — 83735 ASSAY OF MAGNESIUM: CPT

## 2018-02-18 PROCEDURE — 25000003 PHARM REV CODE 250: Performed by: PSYCHIATRY & NEUROLOGY

## 2018-02-18 PROCEDURE — A4216 STERILE WATER/SALINE, 10 ML: HCPCS | Performed by: HOSPITALIST

## 2018-02-18 PROCEDURE — 25000003 PHARM REV CODE 250: Performed by: INTERNAL MEDICINE

## 2018-02-18 PROCEDURE — 99233 SBSQ HOSP IP/OBS HIGH 50: CPT | Mod: ,,, | Performed by: INTERNAL MEDICINE

## 2018-02-18 PROCEDURE — 21400001 HC TELEMETRY ROOM

## 2018-02-18 PROCEDURE — 63600175 PHARM REV CODE 636 W HCPCS: Performed by: HOSPITALIST

## 2018-02-18 PROCEDURE — 80053 COMPREHEN METABOLIC PANEL: CPT

## 2018-02-18 PROCEDURE — 84484 ASSAY OF TROPONIN QUANT: CPT

## 2018-02-18 PROCEDURE — 36415 COLL VENOUS BLD VENIPUNCTURE: CPT

## 2018-02-18 RX ORDER — NAPROXEN SODIUM 220 MG/1
81 TABLET, FILM COATED ORAL DAILY
Status: DISCONTINUED | OUTPATIENT
Start: 2018-02-18 | End: 2018-02-20 | Stop reason: HOSPADM

## 2018-02-18 RX ORDER — LEVETIRACETAM 500 MG/1
500 TABLET ORAL
Status: DISCONTINUED | OUTPATIENT
Start: 2018-02-18 | End: 2018-02-20 | Stop reason: HOSPADM

## 2018-02-18 RX ADMIN — LEVETIRACETAM 750 MG: 100 INJECTION, SOLUTION, CONCENTRATE INTRAVENOUS at 09:02

## 2018-02-18 RX ADMIN — FAMOTIDINE 20 MG: 20 TABLET, FILM COATED ORAL at 09:02

## 2018-02-18 RX ADMIN — HYDRALAZINE HYDROCHLORIDE 25 MG: 25 TABLET ORAL at 09:02

## 2018-02-18 RX ADMIN — HEPARIN SODIUM 5000 UNITS: 5000 INJECTION, SOLUTION INTRAVENOUS; SUBCUTANEOUS at 03:02

## 2018-02-18 RX ADMIN — ASPIRIN 81 MG 81 MG: 81 TABLET ORAL at 09:02

## 2018-02-18 RX ADMIN — HEPARIN SODIUM 5000 UNITS: 5000 INJECTION, SOLUTION INTRAVENOUS; SUBCUTANEOUS at 09:02

## 2018-02-18 RX ADMIN — FUROSEMIDE 100 MG: 40 TABLET ORAL at 05:02

## 2018-02-18 RX ADMIN — FUROSEMIDE 100 MG: 40 TABLET ORAL at 09:02

## 2018-02-18 RX ADMIN — Medication 3 ML: at 09:02

## 2018-02-18 RX ADMIN — LORAZEPAM 2 MG: 2 INJECTION INTRAMUSCULAR; INTRAVENOUS at 02:02

## 2018-02-18 RX ADMIN — Medication 3 ML: at 02:02

## 2018-02-18 RX ADMIN — LABETALOL HYDROCHLORIDE 800 MG: 100 TABLET, FILM COATED ORAL at 03:02

## 2018-02-18 RX ADMIN — ATORVASTATIN CALCIUM 40 MG: 40 TABLET, FILM COATED ORAL at 09:02

## 2018-02-18 RX ADMIN — TACROLIMUS 5 MG: 1 CAPSULE ORAL at 05:02

## 2018-02-18 RX ADMIN — TACROLIMUS 5 MG: 1 CAPSULE ORAL at 09:02

## 2018-02-18 RX ADMIN — LABETALOL HYDROCHLORIDE 800 MG: 100 TABLET, FILM COATED ORAL at 09:02

## 2018-02-18 RX ADMIN — DOCUSATE SODIUM AND SENNOSIDES 1 TABLET: 8.6; 5 TABLET, FILM COATED ORAL at 09:02

## 2018-02-18 RX ADMIN — LABETALOL HYDROCHLORIDE 800 MG: 100 TABLET, FILM COATED ORAL at 06:02

## 2018-02-18 RX ADMIN — CINACALCET HYDROCHLORIDE 30 MG: 30 TABLET, COATED ORAL at 09:02

## 2018-02-18 RX ADMIN — Medication 3 ML: at 06:02

## 2018-02-18 RX ADMIN — LEVETIRACETAM 500 MG: 5 INJECTION INTRAVENOUS at 09:02

## 2018-02-18 RX ADMIN — HEPARIN SODIUM 5000 UNITS: 5000 INJECTION, SOLUTION INTRAVENOUS; SUBCUTANEOUS at 06:02

## 2018-02-18 RX ADMIN — NIFEDIPINE 60 MG: 30 TABLET, FILM COATED, EXTENDED RELEASE ORAL at 09:02

## 2018-02-18 NOTE — SUBJECTIVE & OBJECTIVE
Interval History: BP improving. Poor appetite.    Review of Systems   Constitutional: Negative for chills and fever.   Respiratory: Negative for shortness of breath.    Cardiovascular: Negative for chest pain.     Objective:     Vital Signs (Most Recent):  Temp: 97.7 °F (36.5 °C) (02/18/18 1123)  Pulse: 82 (02/18/18 1123)  Resp: 18 (02/18/18 1123)  BP: 122/71 (02/18/18 1123)  SpO2: 97 % (02/18/18 1123) Vital Signs (24h Range):  Temp:  [97.5 °F (36.4 °C)-98.7 °F (37.1 °C)] 97.7 °F (36.5 °C)  Pulse:  [82-95] 82  Resp:  [16-20] 18  SpO2:  [97 %-100 %] 97 %  BP: (117-154)/(62-91) 122/71     Weight: 53.7 kg (118 lb 4.8 oz)  Body mass index is 23.1 kg/m².    Intake/Output Summary (Last 24 hours) at 02/18/18 1317  Last data filed at 02/18/18 0935   Gross per 24 hour   Intake              520 ml   Output                0 ml   Net              520 ml      Physical Exam   Constitutional: She is oriented to person, place, and time. She appears well-developed and well-nourished. No distress.   HENT:   Right Ear: External ear normal.   Left Ear: External ear normal.   Mouth/Throat: Oropharynx is clear and moist.   Eyes: EOM are normal. Pupils are equal, round, and reactive to light.   Neck: Normal range of motion. Neck supple.   Cardiovascular: Normal rate and normal heart sounds.    No murmur heard.  Pulmonary/Chest: Effort normal and breath sounds normal. No respiratory distress. She has no wheezes.   Abdominal: Soft. Bowel sounds are normal.   Musculoskeletal: Normal range of motion.   Neurological: She is alert and oriented to person, place, and time.   Skin: Skin is warm and dry.       Significant Labs: All pertinent labs within the past 24 hours have been reviewed.    Significant Imaging: I have reviewed and interpreted all pertinent imaging results/findings within the past 24 hours.

## 2018-02-18 NOTE — HOSPITAL COURSE
2/17/18: adm with htn emerg and assoc seizure.  2/19/18: normal MPI    Interval Hx: No cp/sob.  Feels well, wants to go home.  Importance of compliance with med rx stressed to pt.    Tele: SR 70s (pers rev)

## 2018-02-18 NOTE — PROGRESS NOTES
"[facility] Hospital Medicine  Progress Note    Patient Name: Holly Patel  MRN: [id number]  Patient Class: IP- Inpatient   Admission Date: 2/16/2018  Length of Stay: 2 days  Attending Physician: Savanah Stokes MD  Primary Care Provider: Stan Sosa MD        Subjective:     Principal Problem:Hypertensive crisis    HPI:  Holly Patel is a 27 y.o. female that (in part)  has a past medical history of Anemia in ESRD (end-stage renal disease); Chronic rejection of liver transplant; ESRD on hemodialysis; History of splenomegaly; Immunosuppressed; Iron deficiency anemia secondary to inadequate dietary iron intake; Liver replaced by transplant; Moderate protein-calorie malnutrition; MRSA bacteremia; Prophylactic immunotherapy; Renovascular hypertension; Secondary hyperparathyroidism; and Thrombocytopenia. Presents to [facility] Emergency Department Complaining of elevated blood pressure.  Associated symptoms include a headache over the right temporal region with acute onset at approximately 2 PM today while doing home dialysis.  She also has associated mild blurry vision.  Her blood pressure was markedly elevated at home and when she arrived emergency department for systolic blood pressure was as high as 230.  She had removed a clonidine patch earlier in the day and did not promote any one.  She normally does dialysis for approximately 2 hours but to dialysis for approximately 30 minutes and stopped due to the symptoms noted above.  She reports being compliant normally with her outpatient medication regimen which includes a clonidine patch, torsemide, labetalol, nifedipine, and hydralazine.  She also takes Prograf and steroids for liver transplant.  She denies peripheral edema, chest pain, shortness of breath, or dyspnea with exertion.  No diaphoresis or paresthesias.  There is report that her blood pressure is "always elevated".  Generalized location.  " Generalized radiation.  Severe intensity.  Recurrent frequency.  Constant duration.    In the emergency department routine laboratory studies, cardiac enzymes, chest x-ray, EKG were obtained.  She was given hydralazine IV for the elevated blood pressure which did not show any improvement.  She was then given an additional dose of hydralazine and Nitropaste was placed on her chest wall, again without significant improvement.  She was given Lasix and then started on a Tridil drip.  She also received Valium and Fioricet.  It was noted that she had elevated troponin level and some evidence of mild respiratory failure on chest x-ray.  Original referral center notified for transfer to [facility].  Shortly after arrival she began having seizures lasting approximately 5 seconds and occurring every 5 minutes for approximately 4 episodes.  Seizures have been responsive to Ativan and initiation of Cardene drip.      Hospital medicine has been asked to admit for further evaluation and treatment.     Hospital Course:  Ms. Patel was admitted for HTN crisis with new seizure activity. Pt was admitted to the ICU and placed on cardene gtt and home medication resumed. Head CT was unremarkable for any acute abnormality. Her workup was remarkable for elevated troponin. Pt started on Keppra. Cardiology, Nephrology and Neurology consulted. Echo showed EF 65%, G2DD, PAP 44, and a small pericardial effusion. Stress 2/19/2018. Blood pressure much better controlled.    Interval History: BP improving. Poor appetite.    Review of Systems   Constitutional: Negative for chills and fever.   Respiratory: Negative for shortness of breath.    Cardiovascular: Negative for chest pain.     Objective:     Vital Signs (Most Recent):  Temp: 97.7 °F (36.5 °C) (02/18/18 1123)  Pulse: 82 (02/18/18 1123)  Resp: 18 (02/18/18 1123)  BP: 122/71 (02/18/18 1123)  SpO2: 97 % (02/18/18 1123) Vital Signs (24h Range):  Temp:  [97.5 °F (36.4 °C)-98.7 °F (37.1  °C)] 97.7 °F (36.5 °C)  Pulse:  [82-95] 82  Resp:  [16-20] 18  SpO2:  [97 %-100 %] 97 %  BP: (117-154)/(62-91) 122/71     Weight: 53.7 kg (118 lb 4.8 oz)  Body mass index is 23.1 kg/m².    Intake/Output Summary (Last 24 hours) at 02/18/18 1317  Last data filed at 02/18/18 0935   Gross per 24 hour   Intake              520 ml   Output                0 ml   Net              520 ml      Physical Exam   Constitutional: She is oriented to person, place, and time. She appears well-developed and well-nourished. No distress.   HENT:   Right Ear: External ear normal.   Left Ear: External ear normal.   Mouth/Throat: Oropharynx is clear and moist.   Eyes: EOM are normal. Pupils are equal, round, and reactive to light.   Neck: Normal range of motion. Neck supple.   Cardiovascular: Normal rate and normal heart sounds.    No murmur heard.  Pulmonary/Chest: Effort normal and breath sounds normal. No respiratory distress. She has no wheezes.   Abdominal: Soft. Bowel sounds are normal.   Musculoskeletal: Normal range of motion.   Neurological: She is alert and oriented to person, place, and time.   Skin: Skin is warm and dry.       Significant Labs: All pertinent labs within the past 24 hours have been reviewed.    Significant Imaging: I have reviewed and interpreted all pertinent imaging results/findings within the past 24 hours.    Assessment/Plan:      * Hypertensive crisis    Pt with BP that did not respond to multiple IV pushes in the ED  Placed on cardene gtt that has now been weaned off  Pt resumed on home regimen   BP stable and controlled on PO regimen  Transferred to the floor 2/17/2018  Suspect non-compliance to medication regimen at home        Seizure in response to acute event    Likely due to HTN crisis  On Keppra for now, but will not likely need upon discharge  Neurology following   Seizure precautions        Elevated troponin    Trended markers  Likely demand ischemia per Cards in setting of uncontrolled HTN and  ESRD  Monitor on telemetry  Echo with G2DD. Stress 2/19.        Immunosuppressed    Continue tacrolimus and prednisone        Chronic rejection of liver transplant    Continue immunosuppression  Will check tacrolimus level in am  Pt will need to have outpatient liver biopsy scheduled        Anemia in ESRD (end-stage renal disease)    H/H consistent with previous levels  No signs of bleeding  Will monitor        Renovascular hypertension    Long h/o poorly controlled HTN  Non-compliance an issue  As discussed above        ESRD on hemodialysis    Pt does home dialysis 3x/week  Nephrology consulted for HD during hospital stay          VTE Risk Mitigation         Ordered     heparin (porcine) injection 5,000 Units  Every 8 hours     Route:  Subcutaneous        02/16/18 2120     Medium Risk of VTE  Once      02/16/18 2120              Savanah Stokes MD  Department of Hospital Medicine   [facility]

## 2018-02-18 NOTE — PROGRESS NOTES
Lethargic    Had seizure activity today, on keppra    Family at bedside    Ros not reliable    Past Medical History:   Diagnosis Date    Anemia in ESRD (end-stage renal disease) 10/12/2015    Chronic rejection of liver transplant 3/22/2016    ESRD on hemodialysis 9/30/2015    History of splenomegaly 4/12/2016    Immunosuppressed 8/5/2017    Iron deficiency anemia secondary to inadequate dietary iron intake 8/16/2017    [facility].   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)    Moderate protein-calorie malnutrition 8/16/2017    MRSA bacteremia 8/6/2017    Prophylactic immunotherapy 8/4/2014    Renovascular hypertension 10/2/2015    Secondary hyperparathyroidism 8/5/2017    Thrombocytopenia 4/12/2016     Review of patient's allergies indicates:   Allergen Reactions    Chloral hydrate      Other reaction(s): Hallucinations  Other reaction(s): Hives    Hydrocodone Other (See Comments)     Mental status changes       Current Facility-Administered Medications   Medication    acetaminophen tablet 650 mg    aspirin chewable tablet 81 mg    atorvastatin tablet 40 mg    bisacodyl suppository 10 mg    cinacalcet tablet 30 mg    cloNIDine 0.2 mg/24 hr td ptwk 1 patch    famotidine tablet 20 mg    furosemide tablet 100 mg    heparin (porcine) injection 5,000 Units    hydrALAZINE tablet 25 mg    influenza (FLUZONE,FLUARIX QUADRIVALENT) vaccine 0.5 mL    labetalol injection 20 mg    labetalol tablet 800 mg    levETIRAcetam (KEPPRA) 750 mg in dextrose 5 % 100 mL IVPB    mineral oil enema 1 enema    NIFEdipine 24 hr tablet 60 mg    nitroGLYCERIN SL tablet 0.4 mg    ondansetron injection 8 mg    polyethylene glycol packet 17 g    predniSONE tablet 1 mg    promethazine suppository 25 mg    ramelteon tablet 8 mg    senna-docusate 8.6-50 mg per tablet 1 tablet    sodium chloride 0.9% flush 3 mL    tacrolimus capsule  5 mg       LABS    Recent Results (from the past 24 hour(s))   Comprehensive metabolic panel    Collection Time: 02/18/18  4:53 AM   Result Value Ref Range    Sodium 138 136 - 145 mmol/L    Potassium 4.1 3.5 - 5.1 mmol/L    Chloride 101 95 - 110 mmol/L    CO2 23 23 - 29 mmol/L    Glucose 95 70 - 110 mg/dL    BUN, Bld 79 (H) 6 - 20 mg/dL    Creatinine 14.1 (H) 0.5 - 1.4 mg/dL    Calcium 8.2 (L) 8.7 - 10.5 mg/dL    Total Protein 6.3 6.0 - 8.4 g/dL    Albumin 2.4 (L) 3.5 - 5.2 g/dL    Total Bilirubin 0.4 0.1 - 1.0 mg/dL    Alkaline Phosphatase 480 (H) 55 - 135 U/L    AST 22 10 - 40 U/L    ALT 23 10 - 44 U/L    Anion Gap 14 8 - 16 mmol/L    eGFR if African American 4 (A) >60 mL/min/1.73 m^2    eGFR if non African American 3 (A) >60 mL/min/1.73 m^2   Magnesium    Collection Time: 02/18/18  4:53 AM   Result Value Ref Range    Magnesium 2.1 1.6 - 2.6 mg/dL   Troponin I    Collection Time: 02/18/18  4:53 AM   Result Value Ref Range    Troponin I 0.864 (H) 0.000 - 0.026 ng/mL   ]    I/O last 3 completed shifts:  In: 869.7 [P.O.:400; I.V.:269.7; IV Piggyback:200]  Out: -     Vitals:    02/18/18 0738 02/18/18 1123 02/18/18 1424 02/18/18 1631   BP: 137/80 122/71 135/85 127/84   Pulse: 82 82 82 86   Resp: 18 18 18   Temp: 97.5 °F (36.4 °C) 97.7 °F (36.5 °C) 96.5 °F (35.8 °C) 97.7 °F (36.5 °C)   TempSrc: Oral Oral Oral Oral   SpO2: 99% 97% 99% 97%   Weight:       Height:           No Jvd, Thyromegaly or Lymphadenopathy  Lungs: Fairly clear anteriorly and laterally  Cor: RRR no G or rubs  Abd: Soft benign good bowel sounds non tender  Ext: No E C C    A)    Malignant HTN, caused by ? Missing meds  ESRD will dialyze in am  Home hemod 4 x week creat of 12.3 suggest maybe compliance issues or avf issues  HTN  Seizures, this is new. Will need post HD boost  Anemia of ckd with lowish MCV Iron studies 1/4/18 ok  2nd hypeprth on cinacalcet  Thrombocytopenia  Liver disease s/p liver transplant  Recurrent ascites that needed tapping   Low  albumin  Elevated trop1  Pulmonary htn (44   2/17/2018)  Maybe a degree of anxiety and depression

## 2018-02-18 NOTE — ASSESSMENT & PLAN NOTE
Trended markers  Likely demand ischemia per Cards in setting of uncontrolled HTN and ESRD  Monitor on telemetry  Echo with G2DD. Stress 2/19.

## 2018-02-18 NOTE — ASSESSMENT & PLAN NOTE
Likely strain in setting of HTN crisis +/- ESRD, doubt ACS  MPI in am 2/19/18  Change ASA to 81mg qd

## 2018-02-18 NOTE — PLAN OF CARE
Problem: Patient Care Overview  Goal: Plan of Care Review  Outcome: Ongoing (interventions implemented as appropriate)   02/17/18 1919   Coping/Psychosocial   Plan Of Care Reviewed With patient;mother       Problem: Pressure Ulcer Risk (James Scale) (Adult,Obstetrics,Pediatric)  Goal: Skin Integrity  Patient will demonstrate the desired outcomes by discharge/transition of care.   Outcome: Ongoing (interventions implemented as appropriate)   02/17/18 1919   Pressure Ulcer Risk (James Scale) (Adult,Obstetrics,Pediatric)   Skin Integrity making progress toward outcome       Problem: Fall Risk (Adult)  Goal: Absence of Falls  Patient will demonstrate the desired outcomes by discharge/transition of care.   Outcome: Ongoing (interventions implemented as appropriate)   02/17/18 1919   Fall Risk (Adult)   Absence of Falls making progress toward outcome       Problem: Hypertensive Disease/Crisis (Arterial) (Adult)  Goal: Signs and Symptoms of Listed Potential Problems Will be Absent, Minimized or Managed (Hypertensive Disease/Crisis)  Signs and symptoms of listed potential problems will be absent, minimized or managed by discharge/transition of care (reference Hypertensive Disease/Crisis (Arterial) (Adult) CPG).   Outcome: Ongoing (interventions implemented as appropriate)   02/17/18 1919   Hypertensive Disease/Crisis (Arterial)   Problems Assessed (Hypertensive Disease/Crisis (Arterial)) all   Problems Present (Hypertensive Disease/Crisis (Arterial)) situational response;renal dysfunction

## 2018-02-18 NOTE — ASSESSMENT & PLAN NOTE
Pt with BP that did not respond to multiple IV pushes in the ED  Placed on cardene gtt that has now been weaned off  Pt resumed on home regimen   BP stable and controlled on PO regimen  Transferred to the floor 2/17/2018  Suspect non-compliance to medication regimen at home

## 2018-02-18 NOTE — PLAN OF CARE
Problem: Hypertensive Disease/Crisis (Arterial) (Adult)  Goal: Signs and Symptoms of Listed Potential Problems Will be Absent, Minimized or Managed (Hypertensive Disease/Crisis)  Signs and symptoms of listed potential problems will be absent, minimized or managed by discharge/transition of care (reference Hypertensive Disease/Crisis (Arterial) (Adult) CPG).    02/17/18 1919   Hypertensive Disease/Crisis (Arterial)   Problems Assessed (Hypertensive Disease/Crisis (Arterial)) all   Problems Present (Hypertensive Disease/Crisis (Arterial)) situational response;renal dysfunction       Comments: B/P remains WNL without any PRN medication. Without seizure during shift. Skin remains intact. Call light at side.

## 2018-02-18 NOTE — PLAN OF CARE
Problem: Patient Care Overview  Goal: Plan of Care Review  Outcome: Ongoing (interventions implemented as appropriate)   02/18/18 1058   Coping/Psychosocial   Plan Of Care Reviewed With patient;family       Problem: Pressure Ulcer Risk (James Scale) (Adult,Obstetrics,Pediatric)  Goal: Skin Integrity  Patient will demonstrate the desired outcomes by discharge/transition of care.   Outcome: Ongoing (interventions implemented as appropriate)   02/18/18 1058   Pressure Ulcer Risk (James Scale) (Adult,Obstetrics,Pediatric)   Skin Integrity making progress toward outcome       Problem: Fall Risk (Adult)  Goal: Absence of Falls  Patient will demonstrate the desired outcomes by discharge/transition of care.   Outcome: Ongoing (interventions implemented as appropriate)   02/18/18 1058   Fall Risk (Adult)   Absence of Falls making progress toward outcome       Problem: Hypertensive Disease/Crisis (Arterial) (Adult)  Goal: Signs and Symptoms of Listed Potential Problems Will be Absent, Minimized or Managed (Hypertensive Disease/Crisis)  Signs and symptoms of listed potential problems will be absent, minimized or managed by discharge/transition of care (reference Hypertensive Disease/Crisis (Arterial) (Adult) CPG).   Outcome: Ongoing (interventions implemented as appropriate)   02/18/18 1058   Hypertensive Disease/Crisis (Arterial)   Problems Assessed (Hypertensive Disease/Crisis (Arterial)) all   Problems Present (Hypertensive Disease/Crisis (Arterial)) renal dysfunction;situational response

## 2018-02-18 NOTE — SUBJECTIVE & OBJECTIVE
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 ()    Moderate protein-calorie malnutrition 2017    MRSA bacteremia 2017    Prophylactic immunotherapy 2014    Renovascular hypertension 10/2/2015    Secondary hyperparathyroidism 2017    Thrombocytopenia 2016       Past Surgical History:   Procedure Laterality Date     SECTION      2     KIDNEY TRANSPLANT      LIVER BIOPSY      LIVER TRANSPLANT  1992    TUBAL LIGATION         Review of patient's allergies indicates:   Allergen Reactions    Chloral hydrate      Other reaction(s): Hallucinations  Other reaction(s): Hives    Hydrocodone Other (See Comments)     Mental status changes       No current facility-administered medications on file prior to encounter.      Current Outpatient Prescriptions on File Prior to Encounter   Medication Sig    cinacalcet (SENSIPAR) 30 MG Tab Take 1 tablet (30 mg total) by mouth daily with breakfast.    cloNIDine 0.2 mg/24 hr td ptwk (CATAPRES) 0.2 mg/24 hr Place 1 patch onto the skin every 7 days. Change every Friday    famotidine (PEPCID) 20 MG tablet Take 1 tablet (20 mg total) by mouth 2 (two) times daily.    food supplemt, lactose-reduced (ENSURE ACTIVE HIGH PROTEIN) Liqd Take 236 mLs by mouth 2 (two) times daily.    furosemide (LASIX) 20 MG tablet Take 5 tablets (100 mg total) by mouth 2 (two) times daily.    hydrALAZINE (APRESOLINE) 25 MG tablet Take 1 tablet (25 mg total) by mouth every 12 (twelve) hours.    labetalol (NORMODYNE) 200 MG tablet Take 4 tablets (800  mg total) by mouth every 8 (eight) hours.    NIFEdipine (PROCARDIA-XL) 30 MG (OSM) 24 hr tablet Take 2 tablets (60 mg total) by mouth once daily.    ondansetron (ZOFRAN) 4 MG tablet Take 1 tablet (4 mg total) by mouth every 6 (six) hours.    oxyCODONE (ROXICODONE) 5 MG immediate release tablet Take 1 tablet (5 mg total) by mouth every 4 (four) hours as needed for Pain.    predniSONE (DELTASONE) 1 MG tablet Take 1 mg by mouth every other day.    tacrolimus (PROGRAF) 1 MG Cap Take 5 capsules (5 mg total) by mouth every 12 (twelve) hours.    triamcinolone acetonide 0.1% (KENALOG) 0.1 % ointment AAA on arms, legs, and neck bid x 1-2 wks then prn flares only (Patient taking differently: Apply to affected area(s) on arms, legs, and neck twice daily x 1-2 wks then as needed for flares only)     Family History     Problem Relation (Age of Onset)    Hypertension Mother, Father        Social History Main Topics    Smoking status: Never Smoker    Smokeless tobacco: Never Used    Alcohol use No    Drug use: No    Sexual activity: Yes     Partners: Male     Review of Systems   Gastrointestinal: Negative for melena.   Genitourinary: Negative for hematuria.     Objective:     Vital Signs (Most Recent):  Temp: 97.5 °F (36.4 °C) (02/18/18 0738)  Pulse: 82 (02/18/18 0738)  Resp: 18 (02/18/18 0738)  BP: 137/80 (02/18/18 0738)  SpO2: 99 % (02/18/18 0738) Vital Signs (24h Range):  Temp:  [97.5 °F (36.4 °C)-98.7 °F (37.1 °C)] 97.5 °F (36.4 °C)  Pulse:  [] 82  Resp:  [16-40] 18  SpO2:  [97 %-100 %] 99 %  BP: (117-161)/() 137/80     Weight: 53.7 kg (118 lb 4.8 oz)  Body mass index is 23.1 kg/m².    SpO2: 99 %  O2 Device (Oxygen Therapy): room air      Intake/Output Summary (Last 24 hours) at 02/18/18 0919  Last data filed at 02/18/18 0600   Gross per 24 hour   Intake              450 ml   Output                0 ml   Net              450 ml       Lines/Drains/Airways     Drain                 Hemodialysis AV Fistula  Left forearm -- days          Peripheral Intravenous Line                 Peripheral IV - Single Lumen 02/16/18 1646 Right Hand 1 day         Peripheral IV - Single Lumen 02/16/18 2115 Right Antecubital 1 day                Physical Exam   Constitutional: She is oriented to person, place, and time. She appears well-developed and well-nourished. No distress.   HENT:   Head: Normocephalic and atraumatic.   Eyes: Conjunctivae and EOM are normal. Pupils are equal, round, and reactive to light. No scleral icterus.   Neck: Normal range of motion. No JVD present. No tracheal deviation present. No thyromegaly present.   Cardiovascular: Normal rate, regular rhythm, S1 normal and S2 normal.  Exam reveals no gallop and no friction rub.    No murmur heard.  Pulmonary/Chest: Effort normal and breath sounds normal. No respiratory distress. She has no wheezes.   Abdominal: Soft. She exhibits no distension.   Musculoskeletal: She exhibits no edema.   Neurological: She is alert and oriented to person, place, and time. No cranial nerve deficit.   Skin: Skin is warm. She is not diaphoretic. No erythema.   Psychiatric: She has a normal mood and affect. Her behavior is normal.       Current Medications:   aspirin  325 mg Oral Daily    atorvastatin  40 mg Oral Daily    cinacalcet  30 mg Oral Daily with breakfast    cloNIDine 0.2 mg/24 hr td ptwk  1 patch Transdermal Q7 Days    famotidine  20 mg Oral Daily    furosemide  100 mg Oral BID    heparin (porcine)  5,000 Units Subcutaneous Q8H    hydrALAZINE  25 mg Oral Q12H    labetalol  800 mg Oral Q8H    levetiracetam IVPB  500 mg Intravenous Q12H    NIFEdipine  60 mg Oral Daily    polyethylene glycol  17 g Oral Daily    predniSONE  1 mg Oral Every other day    senna-docusate 8.6-50 mg  1 tablet Oral BID    sodium chloride 0.9%  3 mL Intravenous Q8H    tacrolimus  5 mg Oral BID       acetaminophen, bisacodyl, influenza, labetalol, lorazepam, mineral oil, nitroGLYCERIN,  ondansetron, promethazine, ramelteon    Laboratory:  CBC:    Recent Labs  Lab 01/26/18  0418 01/29/18  1404 02/05/18  0930   WHITE BLOOD CELL COUNT 3.09 L 3.34 L 4.06   HEMOGLOBIN 9.2 L 8.8 L 10.0 L   HEMATOCRIT 28.3 L 27.9 L 30.8 L   PLATELETS 81 L 72 L 65 L       CHEMISTRIES:    Recent Labs  Lab 01/26/18  0418  02/05/18  0930 02/17/18  0352 02/18/18  0453   GLUCOSE 84  < > 90 99 95   SODIUM 136  < > 138 136 138   POTASSIUM 4.6  < > 4.9 3.9 4.1   BUN BLD 34 H  < > 62 H 66 H 79 H   CREATININE 11.8 H  < > 13.9 H 12.3 H 14.1 H   EGFR IF  4.5 A  < > 3.7 A 4 A 4 A   EGFR IF NON- 3.9 A  < > 3.2 A 4 A 3 A   CALCIUM 6.8 LL  < > 8.8 9.6 8.2 L   MAGNESIUM 1.8  --   --  2.2 2.1   < > = values in this interval not displayed.    CARDIAC BIOMARKERS:    Recent Labs  Lab 03/17/15  1152  02/17/18  1206 02/17/18  1731 02/18/18  0453   CPK 52  --   --   --   --    TROPONIN I  --   < > 0.895 H 0.948 H 0.864 H   < > = values in this interval not displayed.    COAGS:    Recent Labs  Lab 01/04/18  0444 01/18/18  1618 02/16/18  2146   INR 1.1 1.1 1.1       LIPIDS/LFTS:    Recent Labs  Lab 02/05/18  0930 02/17/18  0352 02/18/18  0453   CHOLESTEROL  --  245 H  --    TRIGLYCERIDES  --  87  --    HDL  --  58  --    LDL CHOLESTEROL  --  169.6 H  --    NON-HDL CHOLESTEROL  --  187  --    AST 35 29 22   ALT 32 33 23     Lab Results   Component Value Date    TSH 2.875 12/22/2017       Diagnostic Results:  ECG (personally reviewed tracings):   2/16/18 1508 , LVH  2/16/18 2349 , LVH, lat ST depression, ?isch vs strain pattern    Chest X-Ray (personally reviewed image(s)): 2/16/18 cmeg, mild CHF    Echo: 2/17/18 (images pers rev, similar to 9/2017)    1 - Normal left ventricular systolic function (EF 60-65%).     2 - No wall motion abnormalities.     3 - Mod-severe concentric hypertrophy.     4 - Impaired LV relaxation, elevated LAP (grade 2 diastolic dysfunction).     5 - Trivial tricuspid regurgitation.      6 - Pulmonary hypertension. The estimated PA systolic pressure is 44 mmHg.     7 - Small anterior pericardial effusion without hemodynamic compromise.

## 2018-02-18 NOTE — NURSING
Per patient's family, patient just had a seizure, upon assessment, pt was awake and oriented, VS stable, patient reports feeling sleepy. Administered prn ativan. NAD noted. Family at bedside. Will continue to monitor.    Notified MD Stef Mejia of patient's seizure. MD states to inform Neurologist.

## 2018-02-18 NOTE — PROGRESS NOTES
[facility]  Cardiology  Progress Note    Patient Name: Holly Patel  MRN: [id number]  Admission Date: 2018  Hospital Length of Stay: 2 days  Code Status: Full Code   Attending Physician: Savanah Stokes MD   Primary Care Physician: Stan Sosa MD  Expected Discharge Date:   Principal Problem:Hypertensive crisis    Subjective:     Hospital Course:   18: adm with htn emerg and assoc seizure.    Interval Hx: more alert today.  No cp/sob.  No further headache.  States she is compliant with her meds at home.  Case d/w RN.    Tele: SR 80 (pers rev)      Past Medical History:   Diagnosis Date    Anemia in ESRD (end-stage renal disease) 10/12/2015    Chronic rejection of liver transplant 3/22/2016    ESRD on hemodialysis 2015    History of splenomegaly 2016    Immunosuppressed 2017    Iron deficiency anemia secondary to inadequate dietary iron intake 2017    She receives IV iron periodically at the D[facility].   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 ()    Moderate protein-calorie malnutrition 2017    MRSA bacteremia 2017    Prophylactic immunotherapy 2014    Renovascular hypertension 10/2/2015    Secondary hyperparathyroidism 2017    Thrombocytopenia 2016       Past Surgical History:   Procedure Laterality Date     SECTION      2     KIDNEY TRANSPLANT      LIVER BIOPSY      LIVER TRANSPLANT  1992    TUBAL LIGATION         Review of patient's allergies indicates:   Allergen Reactions    Chloral hydrate      Other reaction(s): Hallucinations  Other reaction(s): Hives    Hydrocodone Other (See Comments)     Mental status changes       No current facility-administered medications on file prior to encounter.      Current Outpatient Prescriptions on File Prior to Encounter   Medication Sig    cinacalcet (SENSIPAR) 30 MG Tab Take 1 tablet (30 mg total) by mouth daily with  breakfast.    cloNIDine 0.2 mg/24 hr td ptwk (CATAPRES) 0.2 mg/24 hr Place 1 patch onto the skin every 7 days. Change every Friday    famotidine (PEPCID) 20 MG tablet Take 1 tablet (20 mg total) by mouth 2 (two) times daily.    food supplemt, lactose-reduced (ENSURE ACTIVE HIGH PROTEIN) Liqd Take 236 mLs by mouth 2 (two) times daily.    furosemide (LASIX) 20 MG tablet Take 5 tablets (100 mg total) by mouth 2 (two) times daily.    hydrALAZINE (APRESOLINE) 25 MG tablet Take 1 tablet (25 mg total) by mouth every 12 (twelve) hours.    labetalol (NORMODYNE) 200 MG tablet Take 4 tablets (800 mg total) by mouth every 8 (eight) hours.    NIFEdipine (PROCARDIA-XL) 30 MG (OSM) 24 hr tablet Take 2 tablets (60 mg total) by mouth once daily.    ondansetron (ZOFRAN) 4 MG tablet Take 1 tablet (4 mg total) by mouth every 6 (six) hours.    oxyCODONE (ROXICODONE) 5 MG immediate release tablet Take 1 tablet (5 mg total) by mouth every 4 (four) hours as needed for Pain.    predniSONE (DELTASONE) 1 MG tablet Take 1 mg by mouth every other day.    tacrolimus (PROGRAF) 1 MG Cap Take 5 capsules (5 mg total) by mouth every 12 (twelve) hours.    triamcinolone acetonide 0.1% (KENALOG) 0.1 % ointment AAA on arms, legs, and neck bid x 1-2 wks then prn flares only (Patient taking differently: Apply to affected area(s) on arms, legs, and neck twice daily x 1-2 wks then as needed for flares only)     Family History     Problem Relation (Age of Onset)    Hypertension Mother, Father        Social History Main Topics    Smoking status: Never Smoker    Smokeless tobacco: Never Used    Alcohol use No    Drug use: No    Sexual activity: Yes     Partners: Male     Review of Systems   Gastrointestinal: Negative for melena.   Genitourinary: Negative for hematuria.     Objective:     Vital Signs (Most Recent):  Temp: 97.5 °F (36.4 °C) (02/18/18 0738)  Pulse: 82 (02/18/18 0738)  Resp: 18 (02/18/18 0738)  BP: 137/80 (02/18/18 0738)  SpO2: 99  % (02/18/18 0738) Vital Signs (24h Range):  Temp:  [97.5 °F (36.4 °C)-98.7 °F (37.1 °C)] 97.5 °F (36.4 °C)  Pulse:  [] 82  Resp:  [16-40] 18  SpO2:  [97 %-100 %] 99 %  BP: (117-161)/() 137/80     Weight: 53.7 kg (118 lb 4.8 oz)  Body mass index is 23.1 kg/m².    SpO2: 99 %  O2 Device (Oxygen Therapy): room air      Intake/Output Summary (Last 24 hours) at 02/18/18 0919  Last data filed at 02/18/18 0600   Gross per 24 hour   Intake              450 ml   Output                0 ml   Net              450 ml       Lines/Drains/Airways     Drain                 Hemodialysis AV Fistula Left forearm -- days          Peripheral Intravenous Line                 Peripheral IV - Single Lumen 02/16/18 1646 Right Hand 1 day         Peripheral IV - Single Lumen 02/16/18 2115 Right Antecubital 1 day                Physical Exam   Constitutional: She is oriented to person, place, and time. She appears well-developed and well-nourished. No distress.   HENT:   Head: Normocephalic and atraumatic.   Eyes: Conjunctivae and EOM are normal. Pupils are equal, round, and reactive to light. No scleral icterus.   Neck: Normal range of motion. No JVD present. No tracheal deviation present. No thyromegaly present.   Cardiovascular: Normal rate, regular rhythm, S1 normal and S2 normal.  Exam reveals no gallop and no friction rub.    No murmur heard.  Pulmonary/Chest: Effort normal and breath sounds normal. No respiratory distress. She has no wheezes.   Abdominal: Soft. She exhibits no distension.   Musculoskeletal: She exhibits no edema.   Neurological: She is alert and oriented to person, place, and time. No cranial nerve deficit.   Skin: Skin is warm. She is not diaphoretic. No erythema.   Psychiatric: She has a normal mood and affect. Her behavior is normal.       Current Medications:   aspirin  325 mg Oral Daily    atorvastatin  40 mg Oral Daily    cinacalcet  30 mg Oral Daily with breakfast    cloNIDine 0.2 mg/24 hr td ptwk   1 patch Transdermal Q7 Days    famotidine  20 mg Oral Daily    furosemide  100 mg Oral BID    heparin (porcine)  5,000 Units Subcutaneous Q8H    hydrALAZINE  25 mg Oral Q12H    labetalol  800 mg Oral Q8H    levetiracetam IVPB  500 mg Intravenous Q12H    NIFEdipine  60 mg Oral Daily    polyethylene glycol  17 g Oral Daily    predniSONE  1 mg Oral Every other day    senna-docusate 8.6-50 mg  1 tablet Oral BID    sodium chloride 0.9%  3 mL Intravenous Q8H    tacrolimus  5 mg Oral BID       acetaminophen, bisacodyl, influenza, labetalol, lorazepam, mineral oil, nitroGLYCERIN, ondansetron, promethazine, ramelteon    Laboratory:  CBC:    Recent Labs  Lab 01/26/18  0418 01/29/18  1404 02/05/18  0930   WHITE BLOOD CELL COUNT 3.09 L 3.34 L 4.06   HEMOGLOBIN 9.2 L 8.8 L 10.0 L   HEMATOCRIT 28.3 L 27.9 L 30.8 L   PLATELETS 81 L 72 L 65 L       CHEMISTRIES:    Recent Labs  Lab 01/26/18  0418  02/05/18  0930 02/17/18  0352 02/18/18  0453   GLUCOSE 84  < > 90 99 95   SODIUM 136  < > 138 136 138   POTASSIUM 4.6  < > 4.9 3.9 4.1   BUN BLD 34 H  < > 62 H 66 H 79 H   CREATININE 11.8 H  < > 13.9 H 12.3 H 14.1 H   EGFR IF  4.5 A  < > 3.7 A 4 A 4 A   EGFR IF NON- 3.9 A  < > 3.2 A 4 A 3 A   CALCIUM 6.8 LL  < > 8.8 9.6 8.2 L   MAGNESIUM 1.8  --   --  2.2 2.1   < > = values in this interval not displayed.    CARDIAC BIOMARKERS:    Recent Labs  Lab 03/17/15  1152  02/17/18  1206 02/17/18  1731 02/18/18  0453   CPK 52  --   --   --   --    TROPONIN I  --   < > 0.895 H 0.948 H 0.864 H   < > = values in this interval not displayed.    COAGS:    Recent Labs  Lab 01/04/18  0444 01/18/18  1618 02/16/18  2146   INR 1.1 1.1 1.1       LIPIDS/LFTS:    Recent Labs  Lab 02/05/18  0930 02/17/18  0352 02/18/18  0453   CHOLESTEROL  --  245 H  --    TRIGLYCERIDES  --  87  --    HDL  --  58  --    LDL CHOLESTEROL  --  169.6 H  --    NON-HDL CHOLESTEROL  --  187  --    AST 35 29 22   ALT 32 33 23     Lab Results    Component Value Date    TSH 2.875 12/22/2017       Diagnostic Results:  ECG (personally reviewed tracings):   2/16/18 1508 , LVH  2/16/18 2349 , LVH, lat ST depression, ?isch vs strain pattern    Chest X-Ray (personally reviewed image(s)): 2/16/18 cmeg, mild CHF    Echo: 2/17/18 (images pers rev, similar to 9/2017)    1 - Normal left ventricular systolic function (EF 60-65%).     2 - No wall motion abnormalities.     3 - Mod-severe concentric hypertrophy.     4 - Impaired LV relaxation, elevated LAP (grade 2 diastolic dysfunction).     5 - Trivial tricuspid regurgitation.     6 - Pulmonary hypertension. The estimated PA systolic pressure is 44 mmHg.     7 - Small anterior pericardial effusion without hemodynamic compromise.       Assessment and Plan:     * Hypertensive crisis    BP now controlled off cardene gtt, ?compliance issues with med rx/HD.  Cont med rx        ESRD on hemodialysis    Per IM/neph        Chronic rejection of liver transplant    Per IM        Elevated troponin    Likely strain in setting of HTN crisis +/- ESRD, doubt ACS  MPI in am 2/19/18  Change ASA to 81mg qd            VTE Risk Mitigation         Ordered     heparin (porcine) injection 5,000 Units  Every 8 hours     Route:  Subcutaneous        02/16/18 2120     Medium Risk of VTE  Once      02/16/18 2120          Philip Ely MD  Cardiology  [facility]

## 2018-02-18 NOTE — PLAN OF CARE
02/18/18 1601   Discharge Assessment   Assessment Type Discharge Planning Assessment   Confirmed/corrected address and phone number on facesheet? Yes   Assessment information obtained from? Patient   Communicated expected length of stay with patient/caregiver no   Prior to hospitilization cognitive status: Alert/Oriented;No Deficits   Prior to hospitalization functional status: Independent   Current cognitive status: Alert/Oriented;No Deficits   Current Functional Status: Independent   Lives With parent(s);child(ester), dependent   Able to Return to Prior Arrangements yes   Is patient able to care for self after discharge? Yes   Who are your caregiver(s) and their phone number(s)? (mother, Myrna Randolph [phone])   Patient's perception of discharge disposition admitted as an inpatient   Readmission Within The Last 30 Days no previous admission in last 30 days   Patient currently being followed by outpatient case management? No   Patient currently receives home health services? No   Equipment Currently Used at Home other (see comments)  (HD supplies)   Do you have any problems affording any of your prescribed medications? No   Is the patient taking medications as prescribed? yes   Does the patient have transportation home? Yes   Transportation Available family or friend will provide   Dialysis Name and Scheduled days (Home HD pt says, 5 times per week for 2 hours.Affiliated with [facility])   Does the patient receive services at the Coumadin Clinic? No   Discharge Plan A Home with family   Discharge Plan B Home with family   Patient/Family In Agreement With Plan yes   Does the patient currently use HME? No   Does the patient receive outpatient dialysis? No   Are there any open cases? No   Dr. Bass is this home HD pt nephrologist.  Came in with elevated b/p, not sure if compliant with meds.  .  [id number] [place] - [address]  [address]  [place] [address]  Phone: [phone]  Fax: [phone]    [facility] - [place], [place] - [address] AT Portal to Registered [facility] Sites  [address]  [facility] [address]  Phone: [phone] Fax: [phone]    [facility] - [place] - [address]  [address]  [place] [address]  Phone: [phone] Fax: [phone]

## 2018-02-19 LAB
ALBUMIN SERPL BCP-MCNC: 2.5 G/DL
ALP SERPL-CCNC: 465 U/L
ALT SERPL W/O P-5'-P-CCNC: 27 U/L
ANION GAP SERPL CALC-SCNC: 14 MMOL/L
AST SERPL-CCNC: 23 U/L
BILIRUB SERPL-MCNC: 0.4 MG/DL
BUN SERPL-MCNC: 84 MG/DL
CALCIUM SERPL-MCNC: 8 MG/DL
CHLORIDE SERPL-SCNC: 101 MMOL/L
CO2 SERPL-SCNC: 22 MMOL/L
CREAT SERPL-MCNC: 15.7 MG/DL
DIASTOLIC DYSFUNCTION: NO
EST. GFR  (AFRICAN AMERICAN): 3 ML/MIN/1.73 M^2
EST. GFR  (NON AFRICAN AMERICAN): 3 ML/MIN/1.73 M^2
GLUCOSE SERPL-MCNC: 89 MG/DL
MAGNESIUM SERPL-MCNC: 1.8 MG/DL
POTASSIUM SERPL-SCNC: 4.3 MMOL/L
PROT SERPL-MCNC: 6.3 G/DL
SODIUM SERPL-SCNC: 137 MMOL/L

## 2018-02-19 PROCEDURE — 25000003 PHARM REV CODE 250: Performed by: HOSPITALIST

## 2018-02-19 PROCEDURE — 25000003 PHARM REV CODE 250: Performed by: PSYCHIATRY & NEUROLOGY

## 2018-02-19 PROCEDURE — 83735 ASSAY OF MAGNESIUM: CPT

## 2018-02-19 PROCEDURE — 78452 HT MUSCLE IMAGE SPECT MULT: CPT | Mod: 26,,, | Performed by: INTERNAL MEDICINE

## 2018-02-19 PROCEDURE — 87340 HEPATITIS B SURFACE AG IA: CPT

## 2018-02-19 PROCEDURE — 95819 EEG AWAKE AND ASLEEP: CPT

## 2018-02-19 PROCEDURE — 80197 ASSAY OF TACROLIMUS: CPT

## 2018-02-19 PROCEDURE — 63600175 PHARM REV CODE 636 W HCPCS: Performed by: HOSPITALIST

## 2018-02-19 PROCEDURE — 93016 CV STRESS TEST SUPVJ ONLY: CPT | Mod: ,,, | Performed by: INTERNAL MEDICINE

## 2018-02-19 PROCEDURE — 63600175 PHARM REV CODE 636 W HCPCS: Performed by: PSYCHIATRY & NEUROLOGY

## 2018-02-19 PROCEDURE — 63600175 PHARM REV CODE 636 W HCPCS

## 2018-02-19 PROCEDURE — 93017 CV STRESS TEST TRACING ONLY: CPT

## 2018-02-19 PROCEDURE — A4216 STERILE WATER/SALINE, 10 ML: HCPCS | Performed by: HOSPITALIST

## 2018-02-19 PROCEDURE — 80053 COMPREHEN METABOLIC PANEL: CPT

## 2018-02-19 PROCEDURE — 21400001 HC TELEMETRY ROOM

## 2018-02-19 PROCEDURE — 95819 EEG AWAKE AND ASLEEP: CPT | Mod: 26,,, | Performed by: PSYCHIATRY & NEUROLOGY

## 2018-02-19 PROCEDURE — 25000003 PHARM REV CODE 250: Performed by: INTERNAL MEDICINE

## 2018-02-19 PROCEDURE — 93018 CV STRESS TEST I&R ONLY: CPT | Mod: ,,, | Performed by: INTERNAL MEDICINE

## 2018-02-19 PROCEDURE — 99232 SBSQ HOSP IP/OBS MODERATE 35: CPT | Mod: ,,, | Performed by: INTERNAL MEDICINE

## 2018-02-19 PROCEDURE — 86706 HEP B SURFACE ANTIBODY: CPT

## 2018-02-19 RX ORDER — REGADENOSON 0.08 MG/ML
INJECTION, SOLUTION INTRAVENOUS
Status: DISPENSED
Start: 2018-02-19 | End: 2018-02-19

## 2018-02-19 RX ADMIN — PREDNISONE 1 MG: 1 TABLET ORAL at 10:02

## 2018-02-19 RX ADMIN — FUROSEMIDE 100 MG: 40 TABLET ORAL at 08:02

## 2018-02-19 RX ADMIN — HEPARIN SODIUM 5000 UNITS: 5000 INJECTION, SOLUTION INTRAVENOUS; SUBCUTANEOUS at 08:02

## 2018-02-19 RX ADMIN — LEVETIRACETAM 750 MG: 100 INJECTION, SOLUTION, CONCENTRATE INTRAVENOUS at 10:02

## 2018-02-19 RX ADMIN — HEPARIN SODIUM 5000 UNITS: 5000 INJECTION, SOLUTION INTRAVENOUS; SUBCUTANEOUS at 06:02

## 2018-02-19 RX ADMIN — LEVETIRACETAM 750 MG: 100 INJECTION, SOLUTION, CONCENTRATE INTRAVENOUS at 08:02

## 2018-02-19 RX ADMIN — LABETALOL HYDROCHLORIDE 800 MG: 100 TABLET, FILM COATED ORAL at 06:02

## 2018-02-19 RX ADMIN — TACROLIMUS 5 MG: 1 CAPSULE ORAL at 06:02

## 2018-02-19 RX ADMIN — LABETALOL HYDROCHLORIDE 800 MG: 100 TABLET, FILM COATED ORAL at 08:02

## 2018-02-19 RX ADMIN — DOCUSATE SODIUM AND SENNOSIDES 1 TABLET: 8.6; 5 TABLET, FILM COATED ORAL at 08:02

## 2018-02-19 RX ADMIN — FUROSEMIDE 100 MG: 40 TABLET ORAL at 06:02

## 2018-02-19 RX ADMIN — TACROLIMUS 5 MG: 1 CAPSULE ORAL at 08:02

## 2018-02-19 RX ADMIN — Medication 3 ML: at 06:02

## 2018-02-19 RX ADMIN — ASPIRIN 81 MG 81 MG: 81 TABLET ORAL at 08:02

## 2018-02-19 RX ADMIN — Medication 3 ML: at 08:02

## 2018-02-19 RX ADMIN — NIFEDIPINE 60 MG: 30 TABLET, FILM COATED, EXTENDED RELEASE ORAL at 08:02

## 2018-02-19 RX ADMIN — FAMOTIDINE 20 MG: 20 TABLET, FILM COATED ORAL at 08:02

## 2018-02-19 RX ADMIN — LABETALOL HYDROCHLORIDE 800 MG: 100 TABLET, FILM COATED ORAL at 01:02

## 2018-02-19 RX ADMIN — HYDRALAZINE HYDROCHLORIDE 25 MG: 25 TABLET ORAL at 08:02

## 2018-02-19 RX ADMIN — CINACALCET HYDROCHLORIDE 30 MG: 30 TABLET, COATED ORAL at 08:02

## 2018-02-19 RX ADMIN — ATORVASTATIN CALCIUM 40 MG: 40 TABLET, FILM COATED ORAL at 08:02

## 2018-02-19 RX ADMIN — HEPARIN SODIUM 5000 UNITS: 5000 INJECTION, SOLUTION INTRAVENOUS; SUBCUTANEOUS at 01:02

## 2018-02-19 RX ADMIN — POLYETHYLENE GLYCOL 3350 17 G: 17 POWDER, FOR SOLUTION ORAL at 08:02

## 2018-02-19 NOTE — ASSESSMENT & PLAN NOTE
Pt with BP that did not respond to multiple IV pushes in the ED  Placed on cardene gtt that has now been weaned off  Pt resumed on home regimen   BP stable and controlled on PO regimen  Transferred to the floor 2/17/2018  Suspect non-compliance to medication regimen at home. Counseled at length. Will have help from her mother and hopefully a home health skilled nurse after discharge.

## 2018-02-19 NOTE — ASSESSMENT & PLAN NOTE
Likely due to HTN crisis  On Keppra for now. Witnessed seizure again on 2/18/2018  Neurology following   Seizure precautions

## 2018-02-19 NOTE — PROGRESS NOTES
"[facility] Hospital Medicine  Progress Note    Patient Name: Holly Patel  MRN: [id number]  Patient Class: IP- Inpatient   Admission Date: 2/16/2018  Length of Stay: 3 days  Attending Physician: Savanah Stokes MD  Primary Care Provider: Stan Sosa MD        Subjective:     Principal Problem:Hypertensive crisis    HPI:  Holly Patel is a 27 y.o. female that (in part)  has a past medical history of Anemia in ESRD (end-stage renal disease); Chronic rejection of liver transplant; ESRD on hemodialysis; History of splenomegaly; Immunosuppressed; Iron deficiency anemia secondary to inadequate dietary iron intake; Liver replaced by transplant; Moderate protein-calorie malnutrition; MRSA bacteremia; Prophylactic immunotherapy; Renovascular hypertension; Secondary hyperparathyroidism; and Thrombocytopenia. Presents to [facility] Emergency Department Complaining of elevated blood pressure.  Associated symptoms include a headache over the right temporal region with acute onset at approximately 2 PM today while doing home dialysis.  She also has associated mild blurry vision.  Her blood pressure was markedly elevated at home and when she arrived emergency department for systolic blood pressure was as high as 230.  She had removed a clonidine patch earlier in the day and did not promote any one.  She normally does dialysis for approximately 2 hours but to dialysis for approximately 30 minutes and stopped due to the symptoms noted above.  She reports being compliant normally with her outpatient medication regimen which includes a clonidine patch, torsemide, labetalol, nifedipine, and hydralazine.  She also takes Prograf and steroids for liver transplant.  She denies peripheral edema, chest pain, shortness of breath, or dyspnea with exertion.  No diaphoresis or paresthesias.  There is report that her blood pressure is "always elevated".  Generalized location.  " Generalized radiation.  Severe intensity.  Recurrent frequency.  Constant duration.    In the emergency department routine laboratory studies, cardiac enzymes, chest x-ray, EKG were obtained.  She was given hydralazine IV for the elevated blood pressure which did not show any improvement.  She was then given an additional dose of hydralazine and Nitropaste was placed on her chest wall, again without significant improvement.  She was given Lasix and then started on a Tridil drip.  She also received Valium and Fioricet.  It was noted that she had elevated troponin level and some evidence of mild respiratory failure on chest x-ray.  Original referral center notified for transfer to [facility].  Shortly after arrival she began having seizures lasting approximately 5 seconds and occurring every 5 minutes for approximately 4 episodes.  Seizures have been responsive to Ativan and initiation of Cardene drip.      Hospital medicine has been asked to admit for further evaluation and treatment.     Hospital Course:  Ms. Patel was admitted for HTN crisis with new seizure activity. Pt was admitted to the ICU and placed on cardene gtt and home medication resumed. Head CT was unremarkable for any acute abnormality. Her workup was remarkable for elevated troponin. Pt started on Keppra. Cardiology, Nephrology and Neurology consulted. Echo showed EF 65%, G2DD, PAP 44, and a small pericardial effusion. Stress 2/19/2018 free of perfusion defect. Blood pressure much better controlled with medication compliance. She will benefit from a home health skilled nurse to assist with medication compliance. Her mother states that to patient will move in with her after discharge so that she can assist with medication compliance. The patient exhibits little insight into the importance of following her regimen strictly. Education and counseling given at length from doctors and nursing staff.    Interval History: Tolerated stress. Asking to  eat. HD this afternoon.    Review of Systems   Constitutional: Negative for chills and fever.   Respiratory: Negative for shortness of breath.    Cardiovascular: Negative for chest pain.     Objective:     Vital Signs (Most Recent):  Temp: 97 °F (36.1 °C) (02/19/18 0750)  Pulse: 75 (02/19/18 0750)  Resp: 19 (02/19/18 0750)  BP: (!) 147/95 (02/19/18 0750)  SpO2: 100 % (02/19/18 0750) Vital Signs (24h Range):  Temp:  [97 °F (36.1 °C)-97.8 °F (36.6 °C)] 97 °F (36.1 °C)  Pulse:  [75-84] 75  Resp:  [17-19] 19  SpO2:  [97 %-100 %] 100 %  BP: (126-147)/(74-95) 147/95     Weight: 54.6 kg (120 lb 5.9 oz)  Body mass index is 23.51 kg/m².    Intake/Output Summary (Last 24 hours) at 02/19/18 1737  Last data filed at 02/19/18 1320   Gross per 24 hour   Intake              380 ml   Output                0 ml   Net              380 ml      Physical Exam   Constitutional: She is oriented to person, place, and time. She appears well-developed and well-nourished. No distress.   HENT:   Right Ear: External ear normal.   Left Ear: External ear normal.   Mouth/Throat: Oropharynx is clear and moist.   Eyes: EOM are normal. Pupils are equal, round, and reactive to light.   Neck: Normal range of motion. Neck supple.   Cardiovascular: Normal rate and normal heart sounds.    No murmur heard.  Pulmonary/Chest: Effort normal and breath sounds normal. No respiratory distress. She has no wheezes.   Abdominal: Soft. Bowel sounds are normal.   Musculoskeletal: Normal range of motion.   Neurological: She is alert and oriented to person, place, and time.   Skin: Skin is warm and dry.       Significant Labs: All pertinent labs within the past 24 hours have been reviewed.    Significant Imaging: I have reviewed and interpreted all pertinent imaging results/findings within the past 24 hours.    Assessment/Plan:      * Hypertensive crisis    Pt with BP that did not respond to multiple IV pushes in the ED  Placed on cardene gtt that has now been weaned  off  Pt resumed on home regimen   BP stable and controlled on PO regimen  Transferred to the floor 2/17/2018  Suspect non-compliance to medication regimen at home. Counseled at length. Will have help from her mother and hopefully a home health skilled nurse after discharge.        Seizure in response to acute event    Likely due to HTN crisis  On Keppra for now. Witnessed seizure again on 2/18/2018  Neurology following   Seizure precautions        Elevated troponin    Trended markers  Likely demand ischemia per Cards in setting of uncontrolled HTN and ESRD  Monitor on telemetry  Echo with G2DD. Stress 2/19 without perfusion defect        Immunosuppressed    Continue tacrolimus and prednisone        Chronic rejection of liver transplant    Continue immunosuppression  Will check tacrolimus level in am  Pt will need to have outpatient liver biopsy scheduled        Anemia in ESRD (end-stage renal disease)    H/H consistent with previous levels  No signs of bleeding  Will monitor        Renovascular hypertension    Long h/o poorly controlled HTN  Non-compliance an issue  As discussed above        ESRD on hemodialysis    Pt does home dialysis 3x/week  Nephrology consulted for HD during hospital stay          VTE Risk Mitigation         Ordered     heparin (porcine) injection 5,000 Units  Every 8 hours     Route:  Subcutaneous        02/16/18 2120     Medium Risk of VTE  Once      02/16/18 2120              Savanah Stokes MD  Department of Hospital Medicine   [facility]

## 2018-02-19 NOTE — SUBJECTIVE & OBJECTIVE
Interval History: Tolerated stress. Asking to eat. HD this afternoon.    Review of Systems   Constitutional: Negative for chills and fever.   Respiratory: Negative for shortness of breath.    Cardiovascular: Negative for chest pain.     Objective:     Vital Signs (Most Recent):  Temp: 97 °F (36.1 °C) (02/19/18 0750)  Pulse: 75 (02/19/18 0750)  Resp: 19 (02/19/18 0750)  BP: (!) 147/95 (02/19/18 0750)  SpO2: 100 % (02/19/18 0750) Vital Signs (24h Range):  Temp:  [97 °F (36.1 °C)-97.8 °F (36.6 °C)] 97 °F (36.1 °C)  Pulse:  [75-84] 75  Resp:  [17-19] 19  SpO2:  [97 %-100 %] 100 %  BP: (126-147)/(74-95) 147/95     Weight: 54.6 kg (120 lb 5.9 oz)  Body mass index is 23.51 kg/m².    Intake/Output Summary (Last 24 hours) at 02/19/18 1737  Last data filed at 02/19/18 1320   Gross per 24 hour   Intake              380 ml   Output                0 ml   Net              380 ml      Physical Exam   Constitutional: She is oriented to person, place, and time. She appears well-developed and well-nourished. No distress.   HENT:   Right Ear: External ear normal.   Left Ear: External ear normal.   Mouth/Throat: Oropharynx is clear and moist.   Eyes: EOM are normal. Pupils are equal, round, and reactive to light.   Neck: Normal range of motion. Neck supple.   Cardiovascular: Normal rate and normal heart sounds.    No murmur heard.  Pulmonary/Chest: Effort normal and breath sounds normal. No respiratory distress. She has no wheezes.   Abdominal: Soft. Bowel sounds are normal.   Musculoskeletal: Normal range of motion.   Neurological: She is alert and oriented to person, place, and time.   Skin: Skin is warm and dry.       Significant Labs: All pertinent labs within the past 24 hours have been reviewed.    Significant Imaging: I have reviewed and interpreted all pertinent imaging results/findings within the past 24 hours.

## 2018-02-19 NOTE — PROGRESS NOTES
[facility]  Cardiology  Progress Note    Patient Name: Holly Patel  MRN: [id number]  Admission Date: 2018  Hospital Length of Stay: 3 days  Code Status: Full Code   Attending Physician: Savanah Stokes MD   Primary Care Physician: Stan Sosa MD  Expected Discharge Date:   Principal Problem:Hypertensive crisis    Subjective:     Hospital Course:   18: adm with htn emerg and assoc seizure.    Interval Hx: No cp/sob.  MPI planned today.    Tele: SR 70s (pers rev)      Past Medical History:   Diagnosis Date    Anemia in ESRD (end-stage renal disease) 10/12/2015    Chronic rejection of liver transplant 3/22/2016    ESRD on hemodialysis 2015    History of splenomegaly 2016    Immunosuppressed 2017    Iron deficiency anemia secondary to inadequate dietary iron intake 2017    [facility]   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 ()    Moderate protein-calorie malnutrition 2017    MRSA bacteremia 2017    Prophylactic immunotherapy 2014    Renovascular hypertension 10/2/2015    Secondary hyperparathyroidism 2017    Thrombocytopenia 2016       Past Surgical History:   Procedure Laterality Date     SECTION      2     KIDNEY TRANSPLANT      LIVER BIOPSY      LIVER TRANSPLANT  1992    TUBAL LIGATION         Review of patient's allergies indicates:   Allergen Reactions    Chloral hydrate      Other reaction(s): Hallucinations  Other reaction(s): Hives    Hydrocodone Other (See Comments)     Mental status changes       No current facility-administered medications on file prior to encounter.      Current Outpatient Prescriptions on File Prior to Encounter   Medication Sig    cinacalcet (SENSIPAR) 30 MG Tab Take 1 tablet (30 mg total) by mouth daily with breakfast.    cloNIDine 0.2 mg/24 hr td ptwk (CATAPRES) 0.2 mg/24 hr Place 1 patch  onto the skin every 7 days. Change every Friday    famotidine (PEPCID) 20 MG tablet Take 1 tablet (20 mg total) by mouth 2 (two) times daily.    food supplemt, lactose-reduced (ENSURE ACTIVE HIGH PROTEIN) Liqd Take 236 mLs by mouth 2 (two) times daily.    furosemide (LASIX) 20 MG tablet Take 5 tablets (100 mg total) by mouth 2 (two) times daily.    hydrALAZINE (APRESOLINE) 25 MG tablet Take 1 tablet (25 mg total) by mouth every 12 (twelve) hours.    labetalol (NORMODYNE) 200 MG tablet Take 4 tablets (800 mg total) by mouth every 8 (eight) hours.    NIFEdipine (PROCARDIA-XL) 30 MG (OSM) 24 hr tablet Take 2 tablets (60 mg total) by mouth once daily.    ondansetron (ZOFRAN) 4 MG tablet Take 1 tablet (4 mg total) by mouth every 6 (six) hours.    oxyCODONE (ROXICODONE) 5 MG immediate release tablet Take 1 tablet (5 mg total) by mouth every 4 (four) hours as needed for Pain.    predniSONE (DELTASONE) 1 MG tablet Take 1 mg by mouth every other day.    tacrolimus (PROGRAF) 1 MG Cap Take 5 capsules (5 mg total) by mouth every 12 (twelve) hours.    triamcinolone acetonide 0.1% (KENALOG) 0.1 % ointment AAA on arms, legs, and neck bid x 1-2 wks then prn flares only (Patient taking differently: Apply to affected area(s) on arms, legs, and neck twice daily x 1-2 wks then as needed for flares only)     Family History     Problem Relation (Age of Onset)    Hypertension Mother, Father        Social History Main Topics    Smoking status: Never Smoker    Smokeless tobacco: Never Used    Alcohol use No    Drug use: No    Sexual activity: Yes     Partners: Male     Review of Systems   Gastrointestinal: Negative for melena.   Genitourinary: Negative for hematuria.     Objective:     Vital Signs (Most Recent):  Temp: 97 °F (36.1 °C) (02/19/18 0750)  Pulse: 75 (02/19/18 0750)  Resp: 19 (02/19/18 0750)  BP: (!) 147/95 (02/19/18 0750)  SpO2: 100 % (02/19/18 0750) Vital Signs (24h Range):  Temp:  [96.5 °F (35.8 °C)-97.8 °F  (36.6 °C)] 97 °F (36.1 °C)  Pulse:  [75-86] 75  Resp:  [17-19] 19  SpO2:  [97 %-100 %] 100 %  BP: (122-147)/(71-95) 147/95     Weight: 54.6 kg (120 lb 5.9 oz)  Body mass index is 23.51 kg/m².    SpO2: 100 %  O2 Device (Oxygen Therapy): room air      Intake/Output Summary (Last 24 hours) at 02/19/18 0809  Last data filed at 02/18/18 1800   Gross per 24 hour   Intake              420 ml   Output                0 ml   Net              420 ml       Lines/Drains/Airways     Drain                 Hemodialysis AV Fistula Left forearm -- days          Peripheral Intravenous Line                 Peripheral IV - Single Lumen 02/16/18 1646 Right Hand 2 days         Peripheral IV - Single Lumen 02/16/18 2115 Right Antecubital 2 days                Physical Exam   Constitutional: She is oriented to person, place, and time. She appears well-developed and well-nourished. No distress.   HENT:   Head: Normocephalic and atraumatic.   Eyes: Conjunctivae and EOM are normal. Pupils are equal, round, and reactive to light. No scleral icterus.   Neck: Normal range of motion. No JVD present. No tracheal deviation present. No thyromegaly present.   Cardiovascular: Normal rate, regular rhythm, S1 normal and S2 normal.  Exam reveals no gallop and no friction rub.    No murmur heard.  Pulmonary/Chest: Effort normal and breath sounds normal. No respiratory distress. She has no wheezes.   Abdominal: Soft. She exhibits no distension.   Musculoskeletal: She exhibits no edema.   Neurological: She is alert and oriented to person, place, and time. No cranial nerve deficit.   Skin: Skin is warm. She is not diaphoretic. No erythema.   Psychiatric: She has a normal mood and affect. Her behavior is normal.       Current Medications:   aspirin  81 mg Oral Daily    atorvastatin  40 mg Oral Daily    cinacalcet  30 mg Oral Daily with breakfast    cloNIDine 0.2 mg/24 hr td ptwk  1 patch Transdermal Q7 Days    famotidine  20 mg Oral Daily    furosemide   100 mg Oral BID    heparin (porcine)  5,000 Units Subcutaneous Q8H    hydrALAZINE  25 mg Oral Q12H    labetalol  800 mg Oral Q8H    levetiracetam IVPB  750 mg Intravenous Q12H    NIFEdipine  60 mg Oral Daily    polyethylene glycol  17 g Oral Daily    predniSONE  1 mg Oral Every other day    senna-docusate 8.6-50 mg  1 tablet Oral BID    sodium chloride 0.9%  3 mL Intravenous Q8H    tacrolimus  5 mg Oral BID       acetaminophen, bisacodyl, influenza, labetalol, levETIRAcetam, mineral oil, nitroGLYCERIN, ondansetron, promethazine, ramelteon    Laboratory:  CBC:    Recent Labs  Lab 01/26/18  0418 01/29/18  1404 02/05/18  0930   WHITE BLOOD CELL COUNT 3.09 L 3.34 L 4.06   HEMOGLOBIN 9.2 L 8.8 L 10.0 L   HEMATOCRIT 28.3 L 27.9 L 30.8 L   PLATELETS 81 L 72 L 65 L       CHEMISTRIES:    Recent Labs  Lab 01/26/18  0418  02/05/18  0930 02/17/18  0352 02/18/18  0453   GLUCOSE 84  < > 90 99 95   SODIUM 136  < > 138 136 138   POTASSIUM 4.6  < > 4.9 3.9 4.1   BUN BLD 34 H  < > 62 H 66 H 79 H   CREATININE 11.8 H  < > 13.9 H 12.3 H 14.1 H   EGFR IF  4.5 A  < > 3.7 A 4 A 4 A   EGFR IF NON- 3.9 A  < > 3.2 A 4 A 3 A   CALCIUM 6.8 LL  < > 8.8 9.6 8.2 L   MAGNESIUM 1.8  --   --  2.2 2.1   < > = values in this interval not displayed.    CARDIAC BIOMARKERS:    Recent Labs  Lab 03/17/15  1152  02/17/18  1206 02/17/18  1731 02/18/18  0453   CPK 52  --   --   --   --    TROPONIN I  --   < > 0.895 H 0.948 H 0.864 H   < > = values in this interval not displayed.    COAGS:    Recent Labs  Lab 01/04/18  0444 01/18/18  1618 02/16/18  2146   INR 1.1 1.1 1.1       LIPIDS/LFTS:    Recent Labs  Lab 02/05/18  0930 02/17/18  0352 02/18/18  0453   CHOLESTEROL  --  245 H  --    TRIGLYCERIDES  --  87  --    HDL  --  58  --    LDL CHOLESTEROL  --  169.6 H  --    NON-HDL CHOLESTEROL  --  187  --    AST 35 29 22   ALT 32 33 23     Lab Results   Component Value Date    TSH 2.875 12/22/2017       Diagnostic  Results:  ECG (personally reviewed tracings):   2/16/18 1508 , LVH  2/16/18 2349 , LVH, lat ST depression, ?isch vs strain pattern    Chest X-Ray (personally reviewed image(s)): 2/16/18 cmeg, mild CHF    Echo: 2/17/18 (images pers rev, similar to 9/2017)    1 - Normal left ventricular systolic function (EF 60-65%).     2 - No wall motion abnormalities.     3 - Mod-severe concentric hypertrophy.     4 - Impaired LV relaxation, elevated LAP (grade 2 diastolic dysfunction).     5 - Trivial tricuspid regurgitation.     6 - Pulmonary hypertension. The estimated PA systolic pressure is 44 mmHg.     7 - Small anterior pericardial effusion without hemodynamic compromise.       Assessment and Plan:     * Hypertensive crisis    BP now controlled.  ?compliance issues with med rx/HD.  Cont med rx        ESRD on hemodialysis    Per IM/neph        Chronic rejection of liver transplant    Per IM        Elevated troponin    Likely strain in setting of HTN crisis +/- ESRD, doubt ACS  MPI today            VTE Risk Mitigation         Ordered     heparin (porcine) injection 5,000 Units  Every 8 hours     Route:  Subcutaneous        02/16/18 2120     Medium Risk of VTE  Once      02/16/18 2120          Philip Ely MD  Cardiology  [facility]

## 2018-02-19 NOTE — PROGRESS NOTES
WRITTEN DISCHARGE INFORMATION:     Follow-up Information     Stan Sosa MD On 3/1/2018.    Specialty:  Internal Medicine  Why:  out patient services:10:20 A.M. follow up from the hospital  Contact information:  [address]  [facility] [id number]  [phone]               Things that YOU are responsible for to Manage Your Care At Home:  1. Getting your prescriptions filled.  2. Taking you medications as directed. DO NOT MISS ANY DOSES!  3. Going to your follow-up doctor appointments. This is important because it allows the doctor to monitor your progress and to determine if any changes need to be made to your treatment plan.                                                          Help at Home  After discharge for assistance [facility] On Call Nurse Care Line 24/7 assistance  [phone]   Thank you for choosing [facility] for your care.  Please answer any calls you may receive from [facility] we want to continue to support you as you manage your healthcare needs.  Sincerely, Your [facility] Healthcare Manager is,  Bonnie Mckeon RN [facility] [phone]

## 2018-02-19 NOTE — PLAN OF CARE
Problem: Hypertensive Disease/Crisis (Arterial) (Adult)  Intervention: Cluster Activity/Decrease Environmental Stimulation   02/19/18 1526   Cognitive Interventions   Sensory Stimulation Regulation quiet environment promoted;tactile stimulation minimized       Goal: Signs and Symptoms of Listed Potential Problems Will be Absent, Minimized or Managed (Hypertensive Disease/Crisis)  Signs and symptoms of listed potential problems will be absent, minimized or managed by discharge/transition of care (reference Hypertensive Disease/Crisis (Arterial) (Adult) CPG).    02/19/18 1526   Hypertensive Disease/Crisis (Arterial)   Problems Assessed (Hypertensive Disease/Crisis (Arterial)) renal dysfunction;situational response   Problems Present (Hypertensive Disease/Crisis (Arterial)) situational response

## 2018-02-19 NOTE — PROCEDURES
DATE OF PROCEDURE:  02/19/2018.    EEG#:  OW-    REFERRING PHYSICIAN:  Dr. Maharaj    This EEG was performed to assess for subclinical seizures.    ELECTROENCEPHALOGRAM REPORT    METHODOLOGY:  Electroencephalographic (EEG) recording is recorded with   electrodes placed according to the International 10-20 placement system.  Thirty   two (32) channels of digital signal (sampling rate of 512/sec), including T1   and T2, were simultaneously recorded from the scalp and may include EKG, EMG,   and/or eye monitors.  Recording band pass was 0.1 to 512 Hz.  Digital video   recording of the patient is simultaneously recorded with the EEG.  The patient   is instructed to report clinical symptoms which may occur during the recording   session.  EEG and video recording are stored and archived in digital format.    Activation procedures, which include photic stimulation, hyperventilation and   instructing patients to perform simple tasks, are done in selected patients  The EEG is displayed on a monitor screen and can be reviewed using different   montages.  Computer assisted-analysis is employed to detect spike and   electrographic seizure activity.   The entire record is submitted for computer   analysis.  The entire recording is visually reviewed, and the times identified   by computer analysis as being spikes or seizures are reviewed again.    Compressed spectral analysis (CSA) is also performed on the activity recorded   from each individual channel.  This is displayed as a power display of   frequencies from 0 to 30 Hz over time.   The CSA is reviewed looking for   asymmetries in power between homologous areas of the scalp, then compared with   the original EEG recording.  [employer] software was also utilized in the review of this study.  This software   suite analyzes the EEG recording in multiple domains.  Coherence and rhythmicity   are computed to identify EEG sections which may contain organized seizures.    Each  channel undergoes analysis to detect the presence of spike and sharp waves   which have special and morphological characteristics of epileptic activity.  The   routine EEG recording is converted from special into frequency domain.  This is   then displayed comparing homologous areas to identify areas of significant   asymmetry.  Algorithm to identify non-cortically generated artifact is used to   separate artifact from the EEG.  EEG FINDINGS:  The recording was obtained with a number of standard bipolar and   referential montages during wakefulness, drowsiness and sleep.  In the alert   state, the posterior background rhythm was a symmetric, well-modulated,   nonsustained 8-10 Hz activity, which reacted symmetrically to eye opening.    Mixed theta range activity was noted throughout wakefulness.  During drowsiness,   the background rhythm waxed and waned and there were periods of slowing.    Intermittent photic stimulation evoked symmetric posterior driving responses.    Hyperventilation was not performed.  No abnormalities were activated photic   stimulation.  During stage II sleep, symmetric V waves and sleep spindles were   noted.  There were no interictal epileptiform abnormalities and no clinical or   electrographic seizures were recorded.    The EKG channel revealed sinus rhythm.    IMPRESSION:  This is an abnormal EEG during wakefulness, drowsiness and sleep.    Mild slowing of the background was noted.    CLINICAL CORRELATION:  The patient is a 27-year-old female who presented with   hypertensive emergency and a witnessed seizure.  The patient is currently   maintained on Keppra.  This is an abnormal EEG during wakefulness, drowsiness   and sleep.  The overall degree of slowing for given age is suggestive of mild   encephalopathy, nonspecific to the cause.  There is no evidence of an epileptic   process on this recording.  No seizures were recorded during this study.      FAK/HN  dd: 02/19/2018 17:16:16  (CST)  td: 02/19/2018 17:38:04 (CST)  Doc ID   #[id number]  Job ID #[id number]    CC:

## 2018-02-19 NOTE — SUBJECTIVE & OBJECTIVE
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 ()    Moderate protein-calorie malnutrition 2017    MRSA bacteremia 2017    Prophylactic immunotherapy 2014    Renovascular hypertension 10/2/2015    Secondary hyperparathyroidism 2017    Thrombocytopenia 2016       Past Surgical History:   Procedure Laterality Date     SECTION      2     KIDNEY TRANSPLANT      LIVER BIOPSY      LIVER TRANSPLANT  1992    TUBAL LIGATION         Review of patient's allergies indicates:   Allergen Reactions    Chloral hydrate      Other reaction(s): Hallucinations  Other reaction(s): Hives    Hydrocodone Other (See Comments)     Mental status changes       No current facility-administered medications on file prior to encounter.      Current Outpatient Prescriptions on File Prior to Encounter   Medication Sig    cinacalcet (SENSIPAR) 30 MG Tab Take 1 tablet (30 mg total) by mouth daily with breakfast.    cloNIDine 0.2 mg/24 hr td ptwk (CATAPRES) 0.2 mg/24 hr Place 1 patch onto the skin every 7 days. Change every Friday    famotidine (PEPCID) 20 MG tablet Take 1 tablet (20 mg total) by mouth 2 (two) times daily.    food supplemt, lactose-reduced (ENSURE ACTIVE HIGH PROTEIN) Liqd Take 236 mLs by mouth 2 (two) times daily.    furosemide (LASIX) 20 MG tablet Take 5 tablets (100 mg total) by mouth 2 (two) times daily.    hydrALAZINE (APRESOLINE) 25 MG tablet Take 1 tablet (25 mg total) by mouth every 12 (twelve) hours.    labetalol (NORMODYNE) 200 MG tablet Take 4 tablets (800  mg total) by mouth every 8 (eight) hours.    NIFEdipine (PROCARDIA-XL) 30 MG (OSM) 24 hr tablet Take 2 tablets (60 mg total) by mouth once daily.    ondansetron (ZOFRAN) 4 MG tablet Take 1 tablet (4 mg total) by mouth every 6 (six) hours.    oxyCODONE (ROXICODONE) 5 MG immediate release tablet Take 1 tablet (5 mg total) by mouth every 4 (four) hours as needed for Pain.    predniSONE (DELTASONE) 1 MG tablet Take 1 mg by mouth every other day.    tacrolimus (PROGRAF) 1 MG Cap Take 5 capsules (5 mg total) by mouth every 12 (twelve) hours.    triamcinolone acetonide 0.1% (KENALOG) 0.1 % ointment AAA on arms, legs, and neck bid x 1-2 wks then prn flares only (Patient taking differently: Apply to affected area(s) on arms, legs, and neck twice daily x 1-2 wks then as needed for flares only)     Family History     Problem Relation (Age of Onset)    Hypertension Mother, Father        Social History Main Topics    Smoking status: Never Smoker    Smokeless tobacco: Never Used    Alcohol use No    Drug use: No    Sexual activity: Yes     Partners: Male     Review of Systems   Gastrointestinal: Negative for melena.   Genitourinary: Negative for hematuria.     Objective:     Vital Signs (Most Recent):  Temp: 97 °F (36.1 °C) (02/19/18 0750)  Pulse: 75 (02/19/18 0750)  Resp: 19 (02/19/18 0750)  BP: (!) 147/95 (02/19/18 0750)  SpO2: 100 % (02/19/18 0750) Vital Signs (24h Range):  Temp:  [96.5 °F (35.8 °C)-97.8 °F (36.6 °C)] 97 °F (36.1 °C)  Pulse:  [75-86] 75  Resp:  [17-19] 19  SpO2:  [97 %-100 %] 100 %  BP: (122-147)/(71-95) 147/95     Weight: 54.6 kg (120 lb 5.9 oz)  Body mass index is 23.51 kg/m².    SpO2: 100 %  O2 Device (Oxygen Therapy): room air      Intake/Output Summary (Last 24 hours) at 02/19/18 0809  Last data filed at 02/18/18 1800   Gross per 24 hour   Intake              420 ml   Output                0 ml   Net              420 ml       Lines/Drains/Airways     Drain                 Hemodialysis AV  Fistula Left forearm -- days          Peripheral Intravenous Line                 Peripheral IV - Single Lumen 02/16/18 1646 Right Hand 2 days         Peripheral IV - Single Lumen 02/16/18 2115 Right Antecubital 2 days                Physical Exam   Constitutional: She is oriented to person, place, and time. She appears well-developed and well-nourished. No distress.   HENT:   Head: Normocephalic and atraumatic.   Eyes: Conjunctivae and EOM are normal. Pupils are equal, round, and reactive to light. No scleral icterus.   Neck: Normal range of motion. No JVD present. No tracheal deviation present. No thyromegaly present.   Cardiovascular: Normal rate, regular rhythm, S1 normal and S2 normal.  Exam reveals no gallop and no friction rub.    No murmur heard.  Pulmonary/Chest: Effort normal and breath sounds normal. No respiratory distress. She has no wheezes.   Abdominal: Soft. She exhibits no distension.   Musculoskeletal: She exhibits no edema.   Neurological: She is alert and oriented to person, place, and time. No cranial nerve deficit.   Skin: Skin is warm. She is not diaphoretic. No erythema.   Psychiatric: She has a normal mood and affect. Her behavior is normal.       Current Medications:   aspirin  81 mg Oral Daily    atorvastatin  40 mg Oral Daily    cinacalcet  30 mg Oral Daily with breakfast    cloNIDine 0.2 mg/24 hr td ptwk  1 patch Transdermal Q7 Days    famotidine  20 mg Oral Daily    furosemide  100 mg Oral BID    heparin (porcine)  5,000 Units Subcutaneous Q8H    hydrALAZINE  25 mg Oral Q12H    labetalol  800 mg Oral Q8H    levetiracetam IVPB  750 mg Intravenous Q12H    NIFEdipine  60 mg Oral Daily    polyethylene glycol  17 g Oral Daily    predniSONE  1 mg Oral Every other day    senna-docusate 8.6-50 mg  1 tablet Oral BID    sodium chloride 0.9%  3 mL Intravenous Q8H    tacrolimus  5 mg Oral BID       acetaminophen, bisacodyl, influenza, labetalol, levETIRAcetam, mineral oil,  nitroGLYCERIN, ondansetron, promethazine, ramelteon    Laboratory:  CBC:    Recent Labs  Lab 01/26/18  0418 01/29/18  1404 02/05/18  0930   WHITE BLOOD CELL COUNT 3.09 L 3.34 L 4.06   HEMOGLOBIN 9.2 L 8.8 L 10.0 L   HEMATOCRIT 28.3 L 27.9 L 30.8 L   PLATELETS 81 L 72 L 65 L       CHEMISTRIES:    Recent Labs  Lab 01/26/18  0418  02/05/18  0930 02/17/18  0352 02/18/18  0453   GLUCOSE 84  < > 90 99 95   SODIUM 136  < > 138 136 138   POTASSIUM 4.6  < > 4.9 3.9 4.1   BUN BLD 34 H  < > 62 H 66 H 79 H   CREATININE 11.8 H  < > 13.9 H 12.3 H 14.1 H   EGFR IF  4.5 A  < > 3.7 A 4 A 4 A   EGFR IF NON- 3.9 A  < > 3.2 A 4 A 3 A   CALCIUM 6.8 LL  < > 8.8 9.6 8.2 L   MAGNESIUM 1.8  --   --  2.2 2.1   < > = values in this interval not displayed.    CARDIAC BIOMARKERS:    Recent Labs  Lab 03/17/15  1152  02/17/18  1206 02/17/18  1731 02/18/18  0453   CPK 52  --   --   --   --    TROPONIN I  --   < > 0.895 H 0.948 H 0.864 H   < > = values in this interval not displayed.    COAGS:    Recent Labs  Lab 01/04/18  0444 01/18/18  1618 02/16/18  2146   INR 1.1 1.1 1.1       LIPIDS/LFTS:    Recent Labs  Lab 02/05/18  0930 02/17/18  0352 02/18/18  0453   CHOLESTEROL  --  245 H  --    TRIGLYCERIDES  --  87  --    HDL  --  58  --    LDL CHOLESTEROL  --  169.6 H  --    NON-HDL CHOLESTEROL  --  187  --    AST 35 29 22   ALT 32 33 23     Lab Results   Component Value Date    TSH 2.875 12/22/2017       Diagnostic Results:  ECG (personally reviewed tracings):   2/16/18 1508 , LVH  2/16/18 2349 , LVH, lat ST depression, ?isch vs strain pattern    Chest X-Ray (personally reviewed image(s)): 2/16/18 cmeg, mild CHF    Echo: 2/17/18 (images pers rev, similar to 9/2017)    1 - Normal left ventricular systolic function (EF 60-65%).     2 - No wall motion abnormalities.     3 - Mod-severe concentric hypertrophy.     4 - Impaired LV relaxation, elevated LAP (grade 2 diastolic dysfunction).     5 - Trivial tricuspid  regurgitation.     6 - Pulmonary hypertension. The estimated PA systolic pressure is 44 mmHg.     7 - Small anterior pericardial effusion without hemodynamic compromise.

## 2018-02-19 NOTE — ASSESSMENT & PLAN NOTE
Trended markers  Likely demand ischemia per Cards in setting of uncontrolled HTN and ESRD  Monitor on telemetry  Echo with G2DD. Stress 2/19 without perfusion defect

## 2018-02-20 VITALS
DIASTOLIC BLOOD PRESSURE: 75 MMHG | SYSTOLIC BLOOD PRESSURE: 128 MMHG | BODY MASS INDEX: 23.63 KG/M2 | OXYGEN SATURATION: 98 % | RESPIRATION RATE: 18 BRPM | TEMPERATURE: 98 F | HEART RATE: 77 BPM | WEIGHT: 120.38 LBS | HEIGHT: 60 IN

## 2018-02-20 DIAGNOSIS — R56.9 SEIZURES: Primary | ICD-10-CM

## 2018-02-20 LAB
HBV SURFACE AG SERPL QL IA: NEGATIVE
HEP. B SURF AB, QUAL: POSITIVE
HEP. B SURF AB, QUANT.: 91 MIU/ML
TACROLIMUS BLD-MCNC: 6.7 NG/ML

## 2018-02-20 PROCEDURE — 25000003 PHARM REV CODE 250: Performed by: HOSPITALIST

## 2018-02-20 PROCEDURE — 99232 SBSQ HOSP IP/OBS MODERATE 35: CPT | Mod: ,,, | Performed by: PSYCHIATRY & NEUROLOGY

## 2018-02-20 PROCEDURE — 25000003 PHARM REV CODE 250: Performed by: INTERNAL MEDICINE

## 2018-02-20 PROCEDURE — 99232 SBSQ HOSP IP/OBS MODERATE 35: CPT | Mod: ,,, | Performed by: INTERNAL MEDICINE

## 2018-02-20 PROCEDURE — 63600175 PHARM REV CODE 636 W HCPCS: Performed by: HOSPITALIST

## 2018-02-20 PROCEDURE — 63600175 PHARM REV CODE 636 W HCPCS: Performed by: PSYCHIATRY & NEUROLOGY

## 2018-02-20 PROCEDURE — 25000003 PHARM REV CODE 250: Performed by: PSYCHIATRY & NEUROLOGY

## 2018-02-20 PROCEDURE — A4216 STERILE WATER/SALINE, 10 ML: HCPCS | Performed by: HOSPITALIST

## 2018-02-20 RX ORDER — NAPROXEN SODIUM 220 MG/1
81 TABLET, FILM COATED ORAL DAILY
Refills: 0 | Status: ON HOLD
Start: 2018-02-21 | End: 2019-01-01 | Stop reason: HOSPADM

## 2018-02-20 RX ADMIN — FAMOTIDINE 20 MG: 20 TABLET, FILM COATED ORAL at 08:02

## 2018-02-20 RX ADMIN — FUROSEMIDE 100 MG: 40 TABLET ORAL at 08:02

## 2018-02-20 RX ADMIN — TACROLIMUS 5 MG: 1 CAPSULE ORAL at 08:02

## 2018-02-20 RX ADMIN — DOCUSATE SODIUM AND SENNOSIDES 1 TABLET: 8.6; 5 TABLET, FILM COATED ORAL at 08:02

## 2018-02-20 RX ADMIN — NIFEDIPINE 60 MG: 30 TABLET, FILM COATED, EXTENDED RELEASE ORAL at 08:02

## 2018-02-20 RX ADMIN — HEPARIN SODIUM 5000 UNITS: 5000 INJECTION, SOLUTION INTRAVENOUS; SUBCUTANEOUS at 01:02

## 2018-02-20 RX ADMIN — ASPIRIN 81 MG 81 MG: 81 TABLET ORAL at 08:02

## 2018-02-20 RX ADMIN — HEPARIN SODIUM 5000 UNITS: 5000 INJECTION, SOLUTION INTRAVENOUS; SUBCUTANEOUS at 05:02

## 2018-02-20 RX ADMIN — HYDRALAZINE HYDROCHLORIDE 25 MG: 25 TABLET ORAL at 08:02

## 2018-02-20 RX ADMIN — LABETALOL HYDROCHLORIDE 800 MG: 100 TABLET, FILM COATED ORAL at 05:02

## 2018-02-20 RX ADMIN — LABETALOL HYDROCHLORIDE 800 MG: 100 TABLET, FILM COATED ORAL at 01:02

## 2018-02-20 RX ADMIN — ATORVASTATIN CALCIUM 40 MG: 40 TABLET, FILM COATED ORAL at 08:02

## 2018-02-20 RX ADMIN — POLYETHYLENE GLYCOL 3350 17 G: 17 POWDER, FOR SOLUTION ORAL at 08:02

## 2018-02-20 RX ADMIN — Medication 3 ML: at 05:02

## 2018-02-20 RX ADMIN — LEVETIRACETAM 750 MG: 100 INJECTION, SOLUTION, CONCENTRATE INTRAVENOUS at 01:02

## 2018-02-20 RX ADMIN — CINACALCET HYDROCHLORIDE 30 MG: 30 TABLET, COATED ORAL at 08:02

## 2018-02-20 NOTE — PROGRESS NOTES
[facility]  Cardiology  Progress Note    Patient Name: Holly Patel  MRN: [id number]  Admission Date: 2018  Hospital Length of Stay: 4 days  Code Status: Full Code   Attending Physician: Farheen Tellez MD   Primary Care Physician: Stan Sosa MD  Expected Discharge Date:   Principal Problem:Hypertensive crisis    Subjective:     Hospital Course:   18: adm with htn emerg and assoc seizure.  18: normal MPI    Interval Hx: No cp/sob.  Feels well, wants to go home.  Importance of compliance with med rx stressed to pt.    Tele: SR 70s (pers rev)      Past Medical History:   Diagnosis Date    Anemia in ESRD (end-stage renal disease) 10/12/2015    Chronic rejection of liver transplant 3/22/2016    ESRD on hemodialysis 2015    History of splenomegaly 2016    Immunosuppressed 2017    Iron deficiency anemia secondary to inadequate dietary iron intake 2017    She [facility].   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 ()    Moderate protein-calorie malnutrition 2017    MRSA bacteremia 2017    Prophylactic immunotherapy 2014    Renovascular hypertension 10/2/2015    Secondary hyperparathyroidism 2017    Thrombocytopenia 2016       Past Surgical History:   Procedure Laterality Date     SECTION      2     KIDNEY TRANSPLANT      LIVER BIOPSY      LIVER TRANSPLANT  1992    TUBAL LIGATION         Review of patient's allergies indicates:   Allergen Reactions    Chloral hydrate      Other reaction(s): Hallucinations  Other reaction(s): Hives    Hydrocodone Other (See Comments)     Mental status changes       No current facility-administered medications on file prior to encounter.      Current Outpatient Prescriptions on File Prior to Encounter   Medication Sig    cinacalcet (SENSIPAR) 30 MG Tab Take 1 tablet (30 mg total) by mouth daily  with breakfast.    cloNIDine 0.2 mg/24 hr td ptwk (CATAPRES) 0.2 mg/24 hr Place 1 patch onto the skin every 7 days. Change every Friday    famotidine (PEPCID) 20 MG tablet Take 1 tablet (20 mg total) by mouth 2 (two) times daily.    food supplemt, lactose-reduced (ENSURE ACTIVE HIGH PROTEIN) Liqd Take 236 mLs by mouth 2 (two) times daily.    furosemide (LASIX) 20 MG tablet Take 5 tablets (100 mg total) by mouth 2 (two) times daily.    hydrALAZINE (APRESOLINE) 25 MG tablet Take 1 tablet (25 mg total) by mouth every 12 (twelve) hours.    labetalol (NORMODYNE) 200 MG tablet Take 4 tablets (800 mg total) by mouth every 8 (eight) hours.    NIFEdipine (PROCARDIA-XL) 30 MG (OSM) 24 hr tablet Take 2 tablets (60 mg total) by mouth once daily.    ondansetron (ZOFRAN) 4 MG tablet Take 1 tablet (4 mg total) by mouth every 6 (six) hours.    oxyCODONE (ROXICODONE) 5 MG immediate release tablet Take 1 tablet (5 mg total) by mouth every 4 (four) hours as needed for Pain.    predniSONE (DELTASONE) 1 MG tablet Take 1 mg by mouth every other day.    tacrolimus (PROGRAF) 1 MG Cap Take 5 capsules (5 mg total) by mouth every 12 (twelve) hours.    triamcinolone acetonide 0.1% (KENALOG) 0.1 % ointment AAA on arms, legs, and neck bid x 1-2 wks then prn flares only (Patient taking differently: Apply to affected area(s) on arms, legs, and neck twice daily x 1-2 wks then as needed for flares only)     Family History     Problem Relation (Age of Onset)    Hypertension Mother, Father        Social History Main Topics    Smoking status: Never Smoker    Smokeless tobacco: Never Used    Alcohol use No    Drug use: No    Sexual activity: Yes     Partners: Male     Review of Systems   Gastrointestinal: Negative for melena.   Genitourinary: Negative for hematuria.     Objective:     Vital Signs (Most Recent):  Temp: 98.6 °F (37 °C) (02/20/18 0802)  Pulse: 73 (02/20/18 0802)  Resp: 16 (02/20/18 0802)  BP: 138/78 (02/20/18 0802)  SpO2:  99 % (02/20/18 0802) Vital Signs (24h Range):  Temp:  [97.4 °F (36.3 °C)-98.6 °F (37 °C)] 98.6 °F (37 °C)  Pulse:  [70-81] 73  Resp:  [16-22] 16  SpO2:  [95 %-99 %] 99 %  BP: ()/() 138/78     Weight: 54.6 kg (120 lb 5.9 oz)  Body mass index is 23.51 kg/m².    SpO2: 99 %  O2 Device (Oxygen Therapy): room air      Intake/Output Summary (Last 24 hours) at 02/20/18 0819  Last data filed at 02/19/18 1841   Gross per 24 hour   Intake              820 ml   Output             2504 ml   Net            -1684 ml       Lines/Drains/Airways     Drain                 Hemodialysis AV Fistula Left forearm -- days          Peripheral Intravenous Line                 Peripheral IV - Single Lumen 02/16/18 2115 Right Antecubital 3 days                Physical Exam   Constitutional: She is oriented to person, place, and time. She appears well-developed and well-nourished. No distress.   HENT:   Head: Normocephalic and atraumatic.   Eyes: Conjunctivae and EOM are normal. Pupils are equal, round, and reactive to light. No scleral icterus.   Neck: Normal range of motion. No JVD present. No tracheal deviation present. No thyromegaly present.   Cardiovascular: Normal rate, regular rhythm, S1 normal and S2 normal.  Exam reveals no gallop and no friction rub.    No murmur heard.  Pulmonary/Chest: Effort normal and breath sounds normal. No respiratory distress. She has no wheezes.   Abdominal: Soft. She exhibits no distension.   Musculoskeletal: She exhibits no edema.   Neurological: She is alert and oriented to person, place, and time. No cranial nerve deficit.   Skin: Skin is warm. She is not diaphoretic. No erythema.   Psychiatric: She has a normal mood and affect. Her behavior is normal.       Current Medications:   aspirin  81 mg Oral Daily    atorvastatin  40 mg Oral Daily    cinacalcet  30 mg Oral Daily with breakfast    cloNIDine 0.2 mg/24 hr td ptwk  1 patch Transdermal Q7 Days    famotidine  20 mg Oral Daily     furosemide  100 mg Oral BID    heparin (porcine)  5,000 Units Subcutaneous Q8H    hydrALAZINE  25 mg Oral Q12H    labetalol  800 mg Oral Q8H    levetiracetam IVPB  750 mg Intravenous Q12H    NIFEdipine  60 mg Oral Daily    polyethylene glycol  17 g Oral Daily    predniSONE  1 mg Oral Every other day    senna-docusate 8.6-50 mg  1 tablet Oral BID    sodium chloride 0.9%  3 mL Intravenous Q8H    tacrolimus  5 mg Oral BID       acetaminophen, bisacodyl, influenza, labetalol, levETIRAcetam, mineral oil, nitroGLYCERIN, ondansetron, promethazine, ramelteon    Laboratory:  CBC:    Recent Labs  Lab 01/26/18  0418 01/29/18  1404 02/05/18  0930   WHITE BLOOD CELL COUNT 3.09 L 3.34 L 4.06   HEMOGLOBIN 9.2 L 8.8 L 10.0 L   HEMATOCRIT 28.3 L 27.9 L 30.8 L   PLATELETS 81 L 72 L 65 L       CHEMISTRIES:    Recent Labs  Lab 02/17/18  0352 02/18/18  0453 02/19/18  0603   GLUCOSE 99 95 89   SODIUM 136 138 137   POTASSIUM 3.9 4.1 4.3   BUN BLD 66 H 79 H 84 H   CREATININE 12.3 H 14.1 H 15.7 H   EGFR IF  4 A 4 A 3 A   EGFR IF NON- 4 A 3 A 3 A   CALCIUM 9.6 8.2 L 8.0 L   MAGNESIUM 2.2 2.1 1.8       CARDIAC BIOMARKERS:    Recent Labs  Lab 03/17/15  1152  02/17/18  1206 02/17/18  1731 02/18/18  0453   CPK 52  --   --   --   --    TROPONIN I  --   < > 0.895 H 0.948 H 0.864 H   < > = values in this interval not displayed.    COAGS:    Recent Labs  Lab 01/04/18  0444 01/18/18  1618 02/16/18  2146   INR 1.1 1.1 1.1       LIPIDS/LFTS:    Recent Labs  Lab 02/17/18  0352 02/18/18  0453 02/19/18  0603   CHOLESTEROL 245 H  --   --    TRIGLYCERIDES 87  --   --    HDL 58  --   --    LDL CHOLESTEROL 169.6 H  --   --    NON-HDL CHOLESTEROL 187  --   --    AST 29 22 23   ALT 33 23 27     Lab Results   Component Value Date    TSH 2.875 12/22/2017       Diagnostic Results:  ECG (personally reviewed tracings):   2/16/18 1508 , LVH  2/16/18 2349 , LVH, lat ST depression, ?isch vs strain pattern    Chest  X-Ray (personally reviewed image(s)): 2/16/18 cmeg, mild CHF    L MPI 2/19/18 (images pers rev)  Nuclear Quantitative Functional Analysis:   LVEF: 50 %  LVED Volume: 137 ml  LVES Volume: 69 ml  Impression: NORMAL MYOCARDIAL PERFUSION  1. The perfusion scan is free of evidence for myocardial ischemia or injury.   2. Resting wall motion is physiologic.   3. Resting LV function is normal.   4. The ventricular volumes are normal at rest and stress.   5. The extracardiac distribution of radioactivity is normal.     Echo: 2/17/18 (images pers rev, similar to 9/2017)    1 - Normal left ventricular systolic function (EF 60-65%).     2 - No wall motion abnormalities.     3 - Mod-severe concentric hypertrophy.     4 - Impaired LV relaxation, elevated LAP (grade 2 diastolic dysfunction).     5 - Trivial tricuspid regurgitation.     6 - Pulmonary hypertension. The estimated PA systolic pressure is 44 mmHg.     7 - Small anterior pericardial effusion without hemodynamic compromise.       Assessment and Plan:     * Hypertensive crisis    BP now controlled.  ?compliance issues with med rx/HD.  Cont med rx        ESRD on hemodialysis    Per IM/neph        Chronic rejection of liver transplant    Per IM        Elevated troponin    Likely strain in setting of HTN crisis +/- ESRD, doubt ACS  MPI 2/19/18 normal            VTE Risk Mitigation         Ordered     heparin (porcine) injection 5,000 Units  Every 8 hours     Route:  Subcutaneous        02/16/18 2120     Medium Risk of VTE  Once      02/16/18 2120        Cardiology will sign off, pls call with questions.    Philip Ely MD  Cardiology  [facility] [place]

## 2018-02-20 NOTE — PROGRESS NOTES
[facility]  Neurology  Progress Note    Patient Name: Holly Patel  MRN: [id number]  Admission Date: 2/16/2018  Hospital Length of Stay: 4 days  Code Status: Full Code   Attending Provider: Farheen Tellez MD  Primary Care Physician: Stan Sosa MD   Principal Problem:Hypertensive crisis    Subjective:     Interval History: 26 y/o female with medical Hx as listed below comes to ED in hypertensive crisis. While in ICU pt had three reported seizures. A that time her BP was 240/140's. Labetalol given and put on nicardipne infusion. No other seizures after. Pt has no recollection of events.    -2/20/18: Pt with two more reported seizures after transferred out of ICU. No other episodes. Pt is alert and oriented.    Current Neurological Medications:     Current Facility-Administered Medications   Medication Dose Route Frequency Provider Last Rate Last Dose    acetaminophen tablet 650 mg  650 mg Oral Q6H PRN Philip Sargent MD        aspirin chewable tablet 81 mg  81 mg Oral Daily Philip Ely MD   81 mg at 02/20/18 0851    atorvastatin tablet 40 mg  40 mg Oral Daily Philip Sargent MD   40 mg at 02/20/18 0851    bisacodyl suppository 10 mg  10 mg Rectal Daily PRN Philip Sargent MD        cinacalcet tablet 30 mg  30 mg Oral Daily with breakfast Philip Sargent MD   30 mg at 02/20/18 0849    cloNIDine 0.2 mg/24 hr td ptwk 1 patch  1 patch Transdermal Q7 Days Philip Sargent MD   1 patch at 02/17/18 0849    famotidine tablet 20 mg  20 mg Oral Daily Philip Sargent MD   20 mg at 02/20/18 0851    furosemide tablet 100 mg  100 mg Oral BID Philip Sargent MD   100 mg at 02/20/18 0850    heparin (porcine) injection 5,000 Units  5,000 Units Subcutaneous Q8H Philip Sargent MD   5,000 Units at 02/20/18 1305    hydrALAZINE tablet 25 mg  25 mg Oral Q12H Philip Sargent MD   25 mg at 02/20/18 0850    influenza (FLUZONE,FLUARIX QUADRIVALENT)  vaccine 0.5 mL  0.5 mL Intramuscular vaccine x 1 dose Philip Sargent MD        labetalol injection 20 mg  20 mg Intravenous Q4H PRN Sohan Marin MD        labetalol tablet 800 mg  800 mg Oral Q8H Philip Sargent MD   800 mg at 02/20/18 1305    levETIRAcetam (KEPPRA) 750 mg in dextrose 5 % 100 mL IVPB  750 mg Intravenous Q12H Magdy Harper  mL/hr at 02/20/18 1304 750 mg at 02/20/18 1304    levETIRAcetam tablet 500 mg  500 mg Oral PRN Clay Marrero MD        mineral oil enema 1 enema  1 enema Rectal Daily PRN Philip Sargent MD        NIFEdipine 24 hr tablet 60 mg  60 mg Oral Daily Philip Sargent MD   60 mg at 02/20/18 0850    nitroGLYCERIN SL tablet 0.4 mg  0.4 mg Sublingual Q5 Min PRN Philip Sargent MD        ondansetron injection 8 mg  8 mg Intravenous Q8H PRN Philip Sargent MD        polyethylene glycol packet 17 g  17 g Oral Daily Philip Sargent MD   17 g at 02/20/18 0849    predniSONE tablet 1 mg  1 mg Oral Every other day Philip Sargent MD   1 mg at 02/19/18 1049    promethazine suppository 25 mg  25 mg Rectal Q6H PRN Philip Sargent MD        ramelteon tablet 8 mg  8 mg Oral Nightly PRN Philip Sargent MD        senna-docusate 8.6-50 mg per tablet 1 tablet  1 tablet Oral BID Philip Sargent MD   1 tablet at 02/20/18 0850    sodium chloride 0.9% flush 3 mL  3 mL Intravenous Q8H Philip Sargent MD   3 mL at 02/20/18 0548    tacrolimus capsule 5 mg  5 mg Oral BID Philip Sargent MD   5 mg at 02/20/18 0849       Review of Systems   Constitutional: Negative for fever.   HENT: Negative for trouble swallowing.    Eyes: Negative for photophobia.   Respiratory: Negative for shortness of breath.    Cardiovascular: Negative for chest pain.   Gastrointestinal: Negative for abdominal pain.   Genitourinary: Negative for dysuria.   Musculoskeletal: Negative for back pain.   Neurological: Negative for headaches.    Psychiatric/Behavioral: Negative for hallucinations.     Objective:     Vital Signs (Most Recent):  Temp: 98.4 °F (36.9 °C) (02/20/18 1200)  Pulse: 74 (02/20/18 1200)  Resp: 16 (02/20/18 1200)  BP: 131/74 (02/20/18 1200)  SpO2: 99 % (02/20/18 1200) Vital Signs (24h Range):  Temp:  [97.4 °F (36.3 °C)-98.6 °F (37 °C)] 98.4 °F (36.9 °C)  Pulse:  [70-81] 74  Resp:  [16-20] 16  SpO2:  [98 %-99 %] 99 %  BP: ()/() 131/74     Weight: 54.6 kg (120 lb 5.9 oz)  Body mass index is 23.51 kg/m².    Physical Exam  Constitutional: She is oriented to person, place, and time. No distress.   HENT:   Head: Normocephalic.   Eyes: Right eye exhibits no discharge. Left eye exhibits no discharge.   Neck: Normal range of motion.   Cardiovascular: Normal rate.    Pulmonary/Chest: Breath sounds normal.   Abdominal: Bowel sounds are normal.   Musculoskeletal: She exhibits no deformity.   Neurological: She is oriented to person, place, and time. She has normal strength.   Psychiatric: Her speech is normal.         NEUROLOGICAL EXAMINATION:      MENTAL STATUS   Oriented to person, place, and time.   Speech: speech is normal   Level of consciousness: alert     CRANIAL NERVES      CN III, IV, VI   Right pupil: Size: 2 mm. Shape: regular.   Left pupil: Size: 2 mm. Shape: regular.   Nystagmus: none   Ophthalmoparesis: none     CN XII   Tongue deviation: none     MOTOR EXAM      Strength   Strength 5/5 throughout.      GAIT AND COORDINATION      Tremor   Resting tremor: absent       Significant Labs:   CBC: No results for input(s): WBC, HGB, HCT, PLT in the last 48 hours.  CMP:   Recent Labs  Lab 02/19/18  0603   GLU 89      K 4.3      CO2 22*   BUN 84*   CREATININE 15.7*   CALCIUM 8.0*   MG 1.8   PROT 6.3   ALBUMIN 2.5*   BILITOT 0.4   ALKPHOS 465*   AST 23   ALT 27   ANIONGAP 14   EGFRNONAA 3*         Assessment and Plan:     28 y/o female consulted for new-onset seizures     1. Seizures: likely due to marked hypertension.  But two more episodes after BP in normal range.           -MRI brain without contrast can be done as outpatient.           -Levetiracetam 500 mg twice daily.           -Seizure precautions.    -Follow at neurology clinic.   -Seizure restrictions are but not limited to: no driving for six months after last seizure; avoid swimming, high altitude activities, operating heavy machinery, bathing unattended, or engaging in activities in where a seizure will cause harm to self or others.      Active Diagnoses:    Diagnosis Date Noted POA    PRINCIPAL PROBLEM:  Hypertensive crisis [I16.9] 02/16/2018 Yes    Elevated troponin [R74.8] 02/16/2018 Yes    Seizure in response to acute event [R56.9] 02/16/2018 Yes    Immunosuppressed [D89.9] 08/05/2017 Yes     Chronic    Chronic rejection of liver transplant [T86.41] 03/22/2016 Yes     Chronic    Anemia in ESRD (end-stage renal disease) [N18.6, D63.1] 10/12/2015 Yes     Chronic    Renovascular hypertension [I15.0] 10/02/2015 Yes     Chronic    ESRD on hemodialysis [N18.6, Z99.2] 09/30/2015 Not Applicable     Chronic      Problems Resolved During this Admission:    Diagnosis Date Noted Date Resolved POA       VTE Risk Mitigation         Ordered     heparin (porcine) injection 5,000 Units  Every 8 hours     Route:  Subcutaneous        02/16/18 2120     Medium Risk of VTE  Once      02/16/18 2120          Magdy Harper MD  Neurology  [facility] [place]

## 2018-02-20 NOTE — PLAN OF CARE
[facility]    HOME HEALTH ORDERS  FACE TO FACE ENCOUNTER    Patient Name: Holly Patel  YOB: 1990    PCP: Stan Sosa MD   PCP Address: [id number]  PCP Phone Number: [phone]  PCP Fax: [phone]    Encounter Date: 02/20/2018    Admit to Home Health    Diagnoses:  Active Hospital Problems    Diagnosis  POA    *Hypertensive crisis [I16.9]  Yes    Elevated troponin [R74.8]  Yes    Seizure in response to acute event [R56.9]  Yes    Immunosuppressed [D89.9]  Yes     Chronic    Chronic rejection of liver transplant [T86.41]  Yes     Chronic    Anemia in ESRD (end-stage renal disease) [N18.6, D63.1]  Yes     Chronic    Renovascular hypertension [I15.0]  Yes     Chronic    ESRD on hemodialysis [N18.6, Z99.2]  Not Applicable     Chronic      Resolved Hospital Problems    Diagnosis Date Resolved POA   No resolved problems to display.       Future Appointments  Date Time Provider Department Center   2/27/2018 3:20 PM Stan Sosa MD [facility] IM [facility]   3/1/2018 10:20 AM Stan Sosa MD [facility] IM [facility]   3/7/2018 1:40 PM Stan Sosa MD [facility]   3/19/2018 8:30 AM LAB, [facility]     Follow-up Information     Stan Sosa MD On 3/1/2018.    Specialty:  Internal Medicine  Why:  out patient services:10:20 A.M. follow up from the hospital  Contact information:  [address]  [facility] [address]  [phone]             Stan Sosa MD In 1 week.    Specialty:  Internal Medicine  Contact information:  [address]  [place] [address]  [phone]                     I have seen and examined this patient face to face today. My clinical findings that support the need for the home health skilled services and home bound status are the following:  Patient with medication mismanagement issues requiring home bound status as evidenced by  Poor understanding of medication  regimen/dosage and Poor adherence to medication regimen/dosage.  Medical restrictions requiring assistance of another human to leave home due to  Fluid/volume overload.    Allergies:  Review of patient's allergies indicates:   Allergen Reactions    Chloral hydrate      Other reaction(s): Hallucinations  Other reaction(s): Hives    Hydrocodone Other (See Comments)     Mental status changes       Diet: renal diet    Activities: activity as tolerated    Nursing:   SN to complete comprehensive assessment including routine vital signs. Instruct on disease process and s/s of complications to report to MD. Review/verify medication list sent home with the patient at time of discharge  and instruct patient/caregiver as needed. Frequency may be adjusted depending on start of care date.    Notify MD if SBP > 160 or < 90; DBP > 90 or < 50; HR > 120 or < 50; Temp > 101; Other:         CONSULTS:     to evaluate for community resources/long-range planning.    MISCELLANEOUS CARE:  home medication complaince     WOUND CARE ORDERS  n/a      Medications: Review discharge medications with patient and family and provide education.      Current Discharge Medication List      START taking these medications    Details   aspirin 81 MG Chew Take 1 tablet (81 mg total) by mouth once daily.  Refills: 0         CONTINUE these medications which have NOT CHANGED    Details   cinacalcet (SENSIPAR) 30 MG Tab Take 1 tablet (30 mg total) by mouth daily with breakfast.  Qty: 30 tablet, Refills: 11      cloNIDine 0.2 mg/24 hr td ptwk (CATAPRES) 0.2 mg/24 hr Place 1 patch onto the skin every 7 days. Change every Friday  Qty: 4 patch, Refills: 11      famotidine (PEPCID) 20 MG tablet Take 1 tablet (20 mg total) by mouth 2 (two) times daily.  Qty: 20 tablet, Refills: 0      food supplemt, lactose-reduced (ENSURE ACTIVE HIGH PROTEIN) Liqd Take 236 mLs by mouth 2 (two) times daily.  Qty: 60 Can, Refills: 6    Associated Diagnoses: Liver  replaced by transplant; ESRD on hemodialysis; Iron deficiency anemia secondary to inadequate dietary iron intake; Moderate protein-calorie malnutrition      furosemide (LASIX) 20 MG tablet Take 5 tablets (100 mg total) by mouth 2 (two) times daily.  Qty: 300 tablet, Refills: 11      hydrALAZINE (APRESOLINE) 25 MG tablet Take 1 tablet (25 mg total) by mouth every 12 (twelve) hours.  Qty: 60 tablet, Refills: 1      labetalol (NORMODYNE) 200 MG tablet Take 4 tablets (800 mg total) by mouth every 8 (eight) hours.  Qty: 360 tablet, Refills: 11      NIFEdipine (PROCARDIA-XL) 30 MG (OSM) 24 hr tablet Take 2 tablets (60 mg total) by mouth once daily.  Qty: 60 tablet, Refills: 11      ondansetron (ZOFRAN) 4 MG tablet Take 1 tablet (4 mg total) by mouth every 6 (six) hours.  Qty: 12 tablet, Refills: 0      oxyCODONE (ROXICODONE) 5 MG immediate release tablet Take 1 tablet (5 mg total) by mouth every 4 (four) hours as needed for Pain.  Qty: 12 tablet, Refills: 0      predniSONE (DELTASONE) 1 MG tablet Take 1 mg by mouth every other day.      tacrolimus (PROGRAF) 1 MG Cap Take 5 capsules (5 mg total) by mouth every 12 (twelve) hours.  Qty: 300 capsule, Refills: 11    Associated Diagnoses: Liver transplanted      triamcinolone acetonide 0.1% (KENALOG) 0.1 % ointment AAA on arms, legs, and neck bid x 1-2 wks then prn flares only  Qty: 80 g, Refills: 3    Associated Diagnoses: Atopic dermatitis             I certify that this patient is confined to her home and needs intermittent skilled nursing care.

## 2018-02-20 NOTE — PROGRESS NOTES
"11:00AM Pt preference signed for [facility]..Bonnie Mckeon RN, BSN, [facility]  2/20/2018    12noonTN completed HD and CHF S&S with teach back..CHF "Heart Failure Zones" discussed with patient / family member.  Information placed in blue "My Health Packet".  The  Green is the goal zone noted by no SOB or swelling; Yellow the Warning zone, that has the following signs and symptoms: swelling, more shortness of breath, and tiredness.  (The patient or family member will call MD's office) and Red zone:  Emergency Zone,  signs and symptoms:  Patient is struggling to breathe, is confused and has chest pain.  CALL 911.  TN addressed all questions  And utilized Teach Back.  TN / SW contact information given to pt / family member for further assistance. Pt, mother and father present and participated.  Family supportive...Bonnie Mckeon RN, BSN, [facility]  2/20/2018            "

## 2018-02-20 NOTE — SUBJECTIVE & OBJECTIVE
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 ()    Moderate protein-calorie malnutrition 2017    MRSA bacteremia 2017    Prophylactic immunotherapy 2014    Renovascular hypertension 10/2/2015    Secondary hyperparathyroidism 2017    Thrombocytopenia 2016       Past Surgical History:   Procedure Laterality Date     SECTION      2     KIDNEY TRANSPLANT      LIVER BIOPSY      LIVER TRANSPLANT  1992    TUBAL LIGATION         Review of patient's allergies indicates:   Allergen Reactions    Chloral hydrate      Other reaction(s): Hallucinations  Other reaction(s): Hives    Hydrocodone Other (See Comments)     Mental status changes       No current facility-administered medications on file prior to encounter.      Current Outpatient Prescriptions on File Prior to Encounter   Medication Sig    cinacalcet (SENSIPAR) 30 MG Tab Take 1 tablet (30 mg total) by mouth daily with breakfast.    cloNIDine 0.2 mg/24 hr td ptwk (CATAPRES) 0.2 mg/24 hr Place 1 patch onto the skin every 7 days. Change every Friday    famotidine (PEPCID) 20 MG tablet Take 1 tablet (20 mg total) by mouth 2 (two) times daily.    food supplemt, lactose-reduced (ENSURE ACTIVE HIGH PROTEIN) Liqd Take 236 mLs by mouth 2 (two) times daily.    furosemide (LASIX) 20 MG tablet Take 5 tablets (100 mg total) by mouth 2 (two) times daily.    hydrALAZINE (APRESOLINE) 25 MG tablet Take 1 tablet (25 mg total) by mouth every 12 (twelve) hours.    labetalol (NORMODYNE) 200 MG tablet Take 4 tablets (800  mg total) by mouth every 8 (eight) hours.    NIFEdipine (PROCARDIA-XL) 30 MG (OSM) 24 hr tablet Take 2 tablets (60 mg total) by mouth once daily.    ondansetron (ZOFRAN) 4 MG tablet Take 1 tablet (4 mg total) by mouth every 6 (six) hours.    oxyCODONE (ROXICODONE) 5 MG immediate release tablet Take 1 tablet (5 mg total) by mouth every 4 (four) hours as needed for Pain.    predniSONE (DELTASONE) 1 MG tablet Take 1 mg by mouth every other day.    tacrolimus (PROGRAF) 1 MG Cap Take 5 capsules (5 mg total) by mouth every 12 (twelve) hours.    triamcinolone acetonide 0.1% (KENALOG) 0.1 % ointment AAA on arms, legs, and neck bid x 1-2 wks then prn flares only (Patient taking differently: Apply to affected area(s) on arms, legs, and neck twice daily x 1-2 wks then as needed for flares only)     Family History     Problem Relation (Age of Onset)    Hypertension Mother, Father        Social History Main Topics    Smoking status: Never Smoker    Smokeless tobacco: Never Used    Alcohol use No    Drug use: No    Sexual activity: Yes     Partners: Male     Review of Systems   Gastrointestinal: Negative for melena.   Genitourinary: Negative for hematuria.     Objective:     Vital Signs (Most Recent):  Temp: 98.6 °F (37 °C) (02/20/18 0802)  Pulse: 73 (02/20/18 0802)  Resp: 16 (02/20/18 0802)  BP: 138/78 (02/20/18 0802)  SpO2: 99 % (02/20/18 0802) Vital Signs (24h Range):  Temp:  [97.4 °F (36.3 °C)-98.6 °F (37 °C)] 98.6 °F (37 °C)  Pulse:  [70-81] 73  Resp:  [16-22] 16  SpO2:  [95 %-99 %] 99 %  BP: ()/() 138/78     Weight: 54.6 kg (120 lb 5.9 oz)  Body mass index is 23.51 kg/m².    SpO2: 99 %  O2 Device (Oxygen Therapy): room air      Intake/Output Summary (Last 24 hours) at 02/20/18 0819  Last data filed at 02/19/18 1841   Gross per 24 hour   Intake              820 ml   Output             2504 ml   Net            -1684 ml       Lines/Drains/Airways     Drain                 Hemodialysis AV Fistula Left  forearm -- days          Peripheral Intravenous Line                 Peripheral IV - Single Lumen 02/16/18 2115 Right Antecubital 3 days                Physical Exam   Constitutional: She is oriented to person, place, and time. She appears well-developed and well-nourished. No distress.   HENT:   Head: Normocephalic and atraumatic.   Eyes: Conjunctivae and EOM are normal. Pupils are equal, round, and reactive to light. No scleral icterus.   Neck: Normal range of motion. No JVD present. No tracheal deviation present. No thyromegaly present.   Cardiovascular: Normal rate, regular rhythm, S1 normal and S2 normal.  Exam reveals no gallop and no friction rub.    No murmur heard.  Pulmonary/Chest: Effort normal and breath sounds normal. No respiratory distress. She has no wheezes.   Abdominal: Soft. She exhibits no distension.   Musculoskeletal: She exhibits no edema.   Neurological: She is alert and oriented to person, place, and time. No cranial nerve deficit.   Skin: Skin is warm. She is not diaphoretic. No erythema.   Psychiatric: She has a normal mood and affect. Her behavior is normal.       Current Medications:   aspirin  81 mg Oral Daily    atorvastatin  40 mg Oral Daily    cinacalcet  30 mg Oral Daily with breakfast    cloNIDine 0.2 mg/24 hr td ptwk  1 patch Transdermal Q7 Days    famotidine  20 mg Oral Daily    furosemide  100 mg Oral BID    heparin (porcine)  5,000 Units Subcutaneous Q8H    hydrALAZINE  25 mg Oral Q12H    labetalol  800 mg Oral Q8H    levetiracetam IVPB  750 mg Intravenous Q12H    NIFEdipine  60 mg Oral Daily    polyethylene glycol  17 g Oral Daily    predniSONE  1 mg Oral Every other day    senna-docusate 8.6-50 mg  1 tablet Oral BID    sodium chloride 0.9%  3 mL Intravenous Q8H    tacrolimus  5 mg Oral BID       acetaminophen, bisacodyl, influenza, labetalol, levETIRAcetam, mineral oil, nitroGLYCERIN, ondansetron, promethazine, ramelteon    Laboratory:  CBC:    Recent  Labs  Lab 01/26/18  0418 01/29/18  1404 02/05/18  0930   WHITE BLOOD CELL COUNT 3.09 L 3.34 L 4.06   HEMOGLOBIN 9.2 L 8.8 L 10.0 L   HEMATOCRIT 28.3 L 27.9 L 30.8 L   PLATELETS 81 L 72 L 65 L       CHEMISTRIES:    Recent Labs  Lab 02/17/18  0352 02/18/18  0453 02/19/18  0603   GLUCOSE 99 95 89   SODIUM 136 138 137   POTASSIUM 3.9 4.1 4.3   BUN BLD 66 H 79 H 84 H   CREATININE 12.3 H 14.1 H 15.7 H   EGFR IF  4 A 4 A 3 A   EGFR IF NON- 4 A 3 A 3 A   CALCIUM 9.6 8.2 L 8.0 L   MAGNESIUM 2.2 2.1 1.8       CARDIAC BIOMARKERS:    Recent Labs  Lab 03/17/15  1152  02/17/18  1206 02/17/18  1731 02/18/18  0453   CPK 52  --   --   --   --    TROPONIN I  --   < > 0.895 H 0.948 H 0.864 H   < > = values in this interval not displayed.    COAGS:    Recent Labs  Lab 01/04/18  0444 01/18/18  1618 02/16/18  2146   INR 1.1 1.1 1.1       LIPIDS/LFTS:    Recent Labs  Lab 02/17/18  0352 02/18/18  0453 02/19/18  0603   CHOLESTEROL 245 H  --   --    TRIGLYCERIDES 87  --   --    HDL 58  --   --    LDL CHOLESTEROL 169.6 H  --   --    NON-HDL CHOLESTEROL 187  --   --    AST 29 22 23   ALT 33 23 27     Lab Results   Component Value Date    TSH 2.875 12/22/2017       Diagnostic Results:  ECG (personally reviewed tracings):   2/16/18 1508 , LVH  2/16/18 2349 , LVH, lat ST depression, ?isch vs strain pattern    Chest X-Ray (personally reviewed image(s)): 2/16/18 cmeg, mild CHF    L MPI 2/19/18 (images pers rev)  Nuclear Quantitative Functional Analysis:   LVEF: 50 %  LVED Volume: 137 ml  LVES Volume: 69 ml  Impression: NORMAL MYOCARDIAL PERFUSION  1. The perfusion scan is free of evidence for myocardial ischemia or injury.   2. Resting wall motion is physiologic.   3. Resting LV function is normal.   4. The ventricular volumes are normal at rest and stress.   5. The extracardiac distribution of radioactivity is normal.     Echo: 2/17/18 (images pers rev, similar to 9/2017)    1 - Normal left ventricular  systolic function (EF 60-65%).     2 - No wall motion abnormalities.     3 - Mod-severe concentric hypertrophy.     4 - Impaired LV relaxation, elevated LAP (grade 2 diastolic dysfunction).     5 - Trivial tricuspid regurgitation.     6 - Pulmonary hypertension. The estimated PA systolic pressure is 44 mmHg.     7 - Small anterior pericardial effusion without hemodynamic compromise.

## 2018-02-20 NOTE — PLAN OF CARE
02/20/18 1226   Final Note   Assessment Type Final Discharge Note   Discharge Disposition Home-Health   What phone number can be called within the next 1-3 days to see how you are doing after discharge? ([phone])   Hospital Follow Up  Appt(s) scheduled? Yes   Discharge plans and expectations educations in teach back method with documentation complete? Yes   Discharge/Hospital Encounter Summary to (non-[facility]) PCP No   Referral to / orders for Home Health Complete? Yes   Did you assess the readiness or willingness of the family or caregiver to support self management of care? Yes   Right Care Referral Info   Post Acute Recommendation Home-care   [facility]..Bonnie Mckeon, RN, BSN, STN [facility]  2/20/2018

## 2018-02-20 NOTE — PROGRESS NOTES
WRITTEN DISCHARGE INFORMATION:     Follow-up Information     Stan Sosa MD On 3/1/2018.    Specialty:  Internal Medicine  Why:  out patient services:10:20 A.M. follow up from the hospital  Contact information:  [address]  [facility] [address]  [phone]             [facility] [place] On 2/21/2018.    Specialty:  Home Health Services  Why:  Home Health  Contact information:  [address]  [address]  [place] [address]  [phone]               Things that YOU are responsible for to Manage Your Care At Home:  1. Getting your prescriptions filled.  2. Taking you medications as directed. DO NOT MISS ANY DOSES!  3. Going to your follow-up doctor appointments. This is important because it allows the doctor to monitor your progress and to determine if any changes need to be made to your treatment plan.                                                          Help at Home  After discharge for assistance [facility] On Call Nurse Care Line 24/7 assistance  [phone]   Thank you for choosing [facility] for your care.  Please answer any calls you may receive from [facility] we want to continue to support you as you manage your healthcare needs.  Sincerely, Your [facility] Healthcare Manager is,  Bonnie Mckeon RN [facility] [phone]

## 2018-02-20 NOTE — PROGRESS NOTES
Preparation for discharge:  Patient awake, alert and fully oriented, changing clothing for discharge home;   Family is aware that patient is being discharged and in agreement with same;   Family is aware that patient will have MRI as outpatient;   Case management coordination of service completed;   IV and telemetry monitor removed; No bleeding/pain to IV site;   Discussed discharge plan with family including medication and appointments;   Will contact Hospital Transport for w/c to parking garage;   Will continue to f/u until departure from hospital;

## 2018-02-20 NOTE — DISCHARGE SUMMARY
"[facility]  Hospital Medicine  Discharge Summary      Patient Name: Holly Patel  MRN: [id number]  Admission Date: 2/16/2018  Hospital Length of Stay: 4 days  Discharge Date and Time:  02/20/2018 4:04 PM  Attending Physician: Farheen Tellez MD   Discharging Provider: Farheen Tellez MD  Primary Care Provider: Stan Sosa MD      HPI:   Holly Patel is a 27 y.o. female that (in part)  has a past medical history of Anemia in ESRD (end-stage renal disease); Chronic rejection of liver transplant; ESRD on hemodialysis; History of splenomegaly; Immunosuppressed; Iron deficiency anemia secondary to inadequate dietary iron intake; Liver replaced by transplant; Moderate protein-calorie malnutrition; MRSA bacteremia; Prophylactic immunotherapy; Renovascular hypertension; Secondary hyperparathyroidism; and Thrombocytopenia. Presents to [facility] Emergency Department Complaining of elevated blood pressure.  Associated symptoms include a headache over the right temporal region with acute onset at approximately 2 PM today while doing home dialysis.  She also has associated mild blurry vision.  Her blood pressure was markedly elevated at home and when she arrived emergency department for systolic blood pressure was as high as 230.  She had removed a clonidine patch earlier in the day and did not promote any one.  She normally does dialysis for approximately 2 hours but to dialysis for approximately 30 minutes and stopped due to the symptoms noted above.  She reports being compliant normally with her outpatient medication regimen which includes a clonidine patch, torsemide, labetalol, nifedipine, and hydralazine.  She also takes Prograf and steroids for liver transplant.  She denies peripheral edema, chest pain, shortness of breath, or dyspnea with exertion.  No diaphoresis or paresthesias.  There is report that her blood pressure is "always elevated".  " Generalized location.  Generalized radiation.  Severe intensity.  Recurrent frequency.  Constant duration.    In the emergency department routine laboratory studies, cardiac enzymes, chest x-ray, EKG were obtained.  She was given hydralazine IV for the elevated blood pressure which did not show any improvement.  She was then given an additional dose of hydralazine and Nitropaste was placed on her chest wall, again without significant improvement.  She was given Lasix and then started on a Tridil drip.  She also received Valium and Fioricet.  It was noted that she had elevated troponin level and some evidence of mild respiratory failure on chest x-ray.  Original referral center notified for transfer to [facility].  Shortly after arrival she began having seizures lasting approximately 5 seconds and occurring every 5 minutes for approximately 4 episodes.  Seizures have been responsive to Ativan and initiation of Cardene drip.      Hospital medicine has been asked to admit for further evaluation and treatment.     * No surgery found *      Hospital Course:   Ms. Patel was admitted for HTN crisis with new seizure activity. Pt was admitted to the ICU and placed on cardene gtt and home medication resumed. Head CT was unremarkable for any acute abnormality. Her workup was remarkable for elevated troponin. Pt started on Keppra. Cardiology, Nephrology and Neurology consulted. Echo showed EF 65%, G2DD, PAP 44, and a small pericardial effusion. Stress 2/19/2018 free of perfusion defect. Blood pressure much better controlled with medication compliance. She was transferred to Telemetry and remains stable.She will benefit from a home health skilled nurse to assist with medication compliance.which has been arranged at DC time, Her mother states that to patient will move in with her after discharge so that she can assist with medication compliance. The patient exhibits little insight into the importance of following her  regimen strictly. Education and counseling given at length from doctors and nursing staff.  She was seizure free in last 48 hours and EEG show no seizure activity,seizure likely duo to HTN encephalopathy.  She had her routine HD by nephrology.  Patient has been discharged home with follow up with PCP in next few days.     Consults:   Consults         Status Ordering Provider     Inpatient consult to Cardiology  Once     Provider:  Philip Ely MD    Completed PHILIP QUEEN     Inpatient consult to Nephrology  Once     Provider:  Clay Marrero MD    Completed PHILIP QUEEN     Inpatient consult to Neurology  Once     Provider:  Magdy Harper MD    Completed PHILIP QUEEN.          No new Assessment & Plan notes have been filed under this hospital service since the last note was generated.  Service: Hospital Medicine    Final Active Diagnoses:    Diagnosis Date Noted POA    PRINCIPAL PROBLEM:  Hypertensive crisis [I16.9] 02/16/2018 Yes    Elevated troponin [R74.8] 02/16/2018 Yes    Seizure in response to acute event [R56.9] 02/16/2018 Yes    Immunosuppressed [D89.9] 08/05/2017 Yes     Chronic    Chronic rejection of liver transplant [T86.41] 03/22/2016 Yes     Chronic    Anemia in ESRD (end-stage renal disease) [N18.6, D63.1] 10/12/2015 Yes     Chronic    Renovascular hypertension [I15.0] 10/02/2015 Yes     Chronic    ESRD on hemodialysis [N18.6, Z99.2] 09/30/2015 Not Applicable     Chronic      Problems Resolved During this Admission:    Diagnosis Date Noted Date Resolved POA       Discharged Condition: stable    Disposition: Home-Health Care [facility]    Follow Up:  Follow-up Information     Stan Sosa MD On 3/1/2018.    Specialty:  Internal Medicine  Why:  out patient services:10:20 A.M. follow up from the hospital  Contact information:  [address]  [facility] [address]  [phone]             [facility] On 2/21/2018.    Specialty:  Home Health  Services  Why:  Home Health  Contact information:  [address]  [address]  [place] [address]  [phone]             [facility].    Why:  HOME HD 5 TIMES WEEKLY  Contact information:  [address]  [facility] [address]           Michael Mendoza MD On 2/27/2018.    Specialty:  Neurology  Why:  out patient services: will call pt with time  Contact information:  [address]  [address]  [place] [address] [phone]             [facility] of Brain On 2/22/2018.    Why:  12:30 pm  Contact information:  [address]  MRI of Brain                Patient Instructions:     Activity as tolerated         Significant Diagnostic Studies: Labs:   BMP:   Recent Labs  Lab 02/19/18  0603   GLU 89      K 4.3      CO2 22*   BUN 84*   CREATININE 15.7*   CALCIUM 8.0*   MG 1.8   , CBC No results for input(s): WBC, HGB, HCT, PLT in the last 48 hours. and Troponin   Recent Labs  Lab 02/18/18  0453   TROPONINI 0.864*         CT scan: head   Cardiac Graphics: Echocardiogram:   2D echo with color flow doppler:   Results for orders placed or performed during the hospital encounter of 02/16/18   2D echo with color flow doppler   Result Value Ref Range    EF 65 55 - 65    Diastolic Dysfunction Yes (A)     Est. PA Systolic Pressure 44.24 (A)     Pericardial Effusion SMALL (A)     Mitral Valve Mobility NORMAL     Tricuspid Valve Regurgitation TRIVIAL        Pending Diagnostic Studies:     Procedure Component Value Units Date/Time    Hepatitis B Surface Antibody, Qual/Quant [[id number]] Collected:  02/19/18 1731    Order Status:  Sent Lab Status:  In process Updated:  02/19/18 1838    Specimen:  Blood from Blood     NM Myocardial Perfusion Spect Multi Pharmacologic [[id number]] Updated:  02/19/18 1303    Order Status:  Sent Lab Status:  In process          Medications:  Reconciled Home Medications:   Current Discharge Medication List      START taking these medications     Details   aspirin 81 MG Chew Take 1 tablet (81 mg total) by mouth once daily.  Refills: 0         CONTINUE these medications which have NOT CHANGED    Details   cinacalcet (SENSIPAR) 30 MG Tab Take 1 tablet (30 mg total) by mouth daily with breakfast.  Qty: 30 tablet, Refills: 11      cloNIDine 0.2 mg/24 hr td ptwk (CATAPRES) 0.2 mg/24 hr Place 1 patch onto the skin every 7 days. Change every Friday  Qty: 4 patch, Refills: 11      famotidine (PEPCID) 20 MG tablet Take 1 tablet (20 mg total) by mouth 2 (two) times daily.  Qty: 20 tablet, Refills: 0      food supplemt, lactose-reduced (ENSURE ACTIVE HIGH PROTEIN) Liqd Take 236 mLs by mouth 2 (two) times daily.  Qty: 60 Can, Refills: 6    Associated Diagnoses: Liver replaced by transplant; ESRD on hemodialysis; Iron deficiency anemia secondary to inadequate dietary iron intake; Moderate protein-calorie malnutrition      furosemide (LASIX) 20 MG tablet Take 5 tablets (100 mg total) by mouth 2 (two) times daily.  Qty: 300 tablet, Refills: 11      hydrALAZINE (APRESOLINE) 25 MG tablet Take 1 tablet (25 mg total) by mouth every 12 (twelve) hours.  Qty: 60 tablet, Refills: 1      labetalol (NORMODYNE) 200 MG tablet Take 4 tablets (800 mg total) by mouth every 8 (eight) hours.  Qty: 360 tablet, Refills: 11      NIFEdipine (PROCARDIA-XL) 30 MG (OSM) 24 hr tablet Take 2 tablets (60 mg total) by mouth once daily.  Qty: 60 tablet, Refills: 11      ondansetron (ZOFRAN) 4 MG tablet Take 1 tablet (4 mg total) by mouth every 6 (six) hours.  Qty: 12 tablet, Refills: 0      oxyCODONE (ROXICODONE) 5 MG immediate release tablet Take 1 tablet (5 mg total) by mouth every 4 (four) hours as needed for Pain.  Qty: 12 tablet, Refills: 0      predniSONE (DELTASONE) 1 MG tablet Take 1 mg by mouth every other day.      tacrolimus (PROGRAF) 1 MG Cap Take 5 capsules (5 mg total) by mouth every 12 (twelve) hours.  Qty: 300 capsule, Refills: 11    Associated Diagnoses: Liver transplanted       triamcinolone acetonide 0.1% (KENALOG) 0.1 % ointment AAA on arms, legs, and neck bid x 1-2 wks then prn flares only  Qty: 80 g, Refills: 3    Associated Diagnoses: Atopic dermatitis             Indwelling Lines/Drains at time of discharge:   Lines/Drains/Airways     Drain                 Hemodialysis AV Fistula Left forearm -- days                Time spent on the discharge of patient: 30  minutes  Patient was seen and examined on the date of discharge and determined to be suitable for discharge.         Farheen Tellez MD  Department of Hospital Medicine  [facility]

## 2018-02-20 NOTE — PROGRESS NOTES
Dr. Harper, neurologist scheduling pt for out pt mri of the brain...Bonnie Mckeon RN, BSN, [facility]  2/20/2018  '

## 2018-02-21 NOTE — PHYSICIAN QUERY
PT Name: Holly Patel  MR #: [id number]     Physician Query Form - Documentation Clarification      CDS/: Nan Francois               Contact information:[email or web address]     This form is a permanent document in the medical record.     Query Date: February 21, 2018    By submitting this query, we are merely seeking further clarification of documentation. Please utilize your independent clinical judgment when addressing the question(s) below.    The Medical record reflects the following:    Supporting Clinical Findings Location in Medical Record   Pulmonary hypertension.     Echo: 2/17/18 (images pers rev, similar to 9/2017)    1 - Normal left ventricular systolic function (EF 60-65%).     2 - No wall motion abnormalities.     3 - Mod-severe concentric hypertrophy.     4 - Impaired LV relaxation, elevated LAP (grade 2 diastolic dysfunction).     5 - Trivial tricuspid regurgitation.     6 - Pulmonary hypertension. The estimated PA systolic pressure is 44 mmHg.     7 - Small anterior pericardial effusion without hemodynamic compromise.           Cardiology PN 2/20    2D Echo 2/17               Please specify the type of Pulmonary Hypertension:                                                                         Doctor, Please specify diagnosis or diagnoses associated with above clinical findings.    Provider Use Only      [     ] Primary pulmonary hypertension (Group 1)  [     ] Other Secondary pulmonary hypertension (Group 5)  [     ] Secondary pulmonary arterial hypertension due to: _______________  [     ] Pulmonary hypertension due to Left heart disease (Group 2)  [     ] Pulmonary hypertension due to Lung disease and hypoxia (Group 3)  [     ] Chronic thromboembolic pulmonary hypertension (Group 4)  [   x  ] Pulmonary hypertension, unspecified    [     ] Other: ___________________                                                                                                          [  ] Clinically undetermined

## 2018-02-21 NOTE — PHYSICIAN QUERY
PT Name: Holly Patel  MR #: [id number]     Physician Query Form - Diagnosis Clarification      CDS/: Nan Francois               Contact information: [email or web address]     This form is a permanent document in the medical record.     Query Date: February 21, 2018    By submitting this query, we are merely seeking further clarification of documentation.  Please utilize your independent clinical judgment when addressing the question(s) below.     The medical record contains the following:      Findings Supporting Clinical Information Location in Medical Record   some evidence of mild respiratory failure        She denies peripheral edema, chest pain, shortness of breath, or dyspnea with exertion.  DC Summary       H&P     Please clarify if the _respiratory failure__ diagnosis has been:    [  ] Ruled In  [  ] Ruled In, Now Resolved  [  x] Ruled Out  [  ] Clinically insignificant  [  ] Clinically undetermined  [  ] Other/Clarification of findings (please specify)_______________________________    Please document in your progress notes daily for the duration of treatment, until resolved, and include in your discharge summary.

## 2018-02-22 ENCOUNTER — TELEPHONE (OUTPATIENT)
Dept: ADMINISTRATIVE | Facility: CLINIC | Age: 28
End: 2018-02-22

## 2018-02-22 ENCOUNTER — HOSPITAL ENCOUNTER (OUTPATIENT)
Dept: RADIOLOGY | Facility: HOSPITAL | Age: 28
Discharge: HOME | DRG: 304 | End: 2018-02-22
Attending: PSYCHIATRY & NEUROLOGY
Payer: MEDICARE

## 2018-02-22 DIAGNOSIS — R56.9 SEIZURES: ICD-10-CM

## 2018-02-22 PROCEDURE — 70551 MRI BRAIN STEM W/O DYE: CPT | Mod: 26,,, | Performed by: RADIOLOGY

## 2018-02-22 PROCEDURE — 70551 MRI BRAIN STEM W/O DYE: CPT | Mod: TC

## 2018-02-22 NOTE — PATIENT INSTRUCTIONS
Home Health is requesting medication order:      Patient hasno HYDRALAZINE at this time. Hospital dc order was for HYDRALAZINE 25mg every 12 hrs.     Please send, or resend to pharmacy.       Thanks,     have a good day.

## 2018-02-23 ENCOUNTER — HOSPITAL ENCOUNTER (INPATIENT)
Facility: HOSPITAL | Age: 28
LOS: 6 days | Discharge: HOME HEALTH | DRG: 304 | End: 2018-03-01
Attending: HOSPITALIST | Admitting: HOSPITALIST
Payer: MEDICARE

## 2018-02-23 ENCOUNTER — HOSPITAL ENCOUNTER (EMERGENCY)
Facility: OTHER | Age: 28
Discharge: ANOTHER HOSPITAL | End: 2018-02-23
Attending: EMERGENCY MEDICINE
Payer: MEDICARE

## 2018-02-23 VITALS
RESPIRATION RATE: 16 BRPM | OXYGEN SATURATION: 100 % | DIASTOLIC BLOOD PRESSURE: 99 MMHG | WEIGHT: 117.63 LBS | TEMPERATURE: 99 F | HEART RATE: 92 BPM | BODY MASS INDEX: 19.6 KG/M2 | SYSTOLIC BLOOD PRESSURE: 153 MMHG | HEIGHT: 65 IN

## 2018-02-23 DIAGNOSIS — R79.89 ELEVATED TROPONIN: ICD-10-CM

## 2018-02-23 DIAGNOSIS — R50.9 FEBRILE ILLNESS: ICD-10-CM

## 2018-02-23 DIAGNOSIS — N25.81 SECONDARY HYPERPARATHYROIDISM: ICD-10-CM

## 2018-02-23 DIAGNOSIS — D63.1 ANEMIA IN ESRD (END-STAGE RENAL DISEASE): Chronic | ICD-10-CM

## 2018-02-23 DIAGNOSIS — Z99.2 ESRD ON HEMODIALYSIS: Chronic | ICD-10-CM

## 2018-02-23 DIAGNOSIS — D84.9 IMMUNOSUPPRESSION: Chronic | ICD-10-CM

## 2018-02-23 DIAGNOSIS — R56.9 SEIZURE IN RESPONSE TO ACUTE EVENT: ICD-10-CM

## 2018-02-23 DIAGNOSIS — R07.9 CHEST PAIN: ICD-10-CM

## 2018-02-23 DIAGNOSIS — D69.6 THROMBOCYTOPENIA: ICD-10-CM

## 2018-02-23 DIAGNOSIS — N18.6 ANEMIA IN ESRD (END-STAGE RENAL DISEASE): Chronic | ICD-10-CM

## 2018-02-23 DIAGNOSIS — Z94.4 LIVER REPLACED BY TRANSPLANT: Chronic | ICD-10-CM

## 2018-02-23 DIAGNOSIS — Z91.148 NON COMPLIANCE W MEDICATION REGIMEN: ICD-10-CM

## 2018-02-23 DIAGNOSIS — I10 HYPERTENSION: ICD-10-CM

## 2018-02-23 DIAGNOSIS — N18.6 ESRD ON HEMODIALYSIS: Chronic | ICD-10-CM

## 2018-02-23 DIAGNOSIS — R56.9 SEIZURES: ICD-10-CM

## 2018-02-23 DIAGNOSIS — I10 UNCONTROLLED HYPERTENSION: ICD-10-CM

## 2018-02-23 DIAGNOSIS — I16.1 HYPERTENSIVE EMERGENCY: Primary | ICD-10-CM

## 2018-02-23 LAB
ALBUMIN SERPL-MCNC: 2.8 G/DL (ref 3.3–5.5)
ALP SERPL-CCNC: 493 U/L (ref 42–141)
B-HCG UR QL: NEGATIVE
BILIRUB SERPL-MCNC: 0.4 MG/DL (ref 0.2–1.6)
BUN SERPL-MCNC: 23 MG/DL (ref 7–22)
CALCIUM SERPL-MCNC: 8 MG/DL (ref 8–10.3)
CHLORIDE SERPL-SCNC: 92 MMOL/L (ref 98–108)
CREAT SERPL-MCNC: 6.1 MG/DL (ref 0.6–1.2)
CTP QC/QA: YES
GLUCOSE SERPL-MCNC: 94 MG/DL (ref 73–118)
POC ALT (SGPT): 38 U/L (ref 10–47)
POC AST (SGOT): 56 U/L (ref 11–38)
POC CARDIAC TROPONIN I: 0.08 NG/ML
POC PTINR: 1.2 (ref 0.9–1.2)
POC PTWBT: 14.4 SEC (ref 9.7–14.3)
POC TCO2: 27 MMOL/L (ref 18–33)
POTASSIUM BLD-SCNC: 3.1 MMOL/L (ref 3.6–5.1)
PROTEIN, POC: 7.4 G/DL (ref 6.4–8.1)
SAMPLE: ABNORMAL
SAMPLE: NORMAL
SODIUM BLD-SCNC: 136 MMOL/L (ref 128–145)

## 2018-02-23 PROCEDURE — 81025 URINE PREGNANCY TEST: CPT | Performed by: EMERGENCY MEDICINE

## 2018-02-23 PROCEDURE — 99285 EMERGENCY DEPT VISIT HI MDM: CPT | Mod: 25

## 2018-02-23 PROCEDURE — 85025 COMPLETE CBC W/AUTO DIFF WBC: CPT

## 2018-02-23 PROCEDURE — 20000000 HC ICU ROOM

## 2018-02-23 PROCEDURE — 93005 ELECTROCARDIOGRAM TRACING: CPT

## 2018-02-23 PROCEDURE — 93010 ELECTROCARDIOGRAM REPORT: CPT | Mod: ,,, | Performed by: INTERNAL MEDICINE

## 2018-02-23 PROCEDURE — 96374 THER/PROPH/DIAG INJ IV PUSH: CPT

## 2018-02-23 PROCEDURE — 25000003 PHARM REV CODE 250: Performed by: EMERGENCY MEDICINE

## 2018-02-23 PROCEDURE — 85610 PROTHROMBIN TIME: CPT

## 2018-02-23 PROCEDURE — 80053 COMPREHEN METABOLIC PANEL: CPT

## 2018-02-23 PROCEDURE — 84484 ASSAY OF TROPONIN QUANT: CPT

## 2018-02-23 RX ORDER — ACETAMINOPHEN 325 MG/1
650 TABLET ORAL EVERY 8 HOURS PRN
Status: DISCONTINUED | OUTPATIENT
Start: 2018-02-24 | End: 2018-03-01 | Stop reason: HOSPADM

## 2018-02-23 RX ORDER — BISACODYL 10 MG
10 SUPPOSITORY, RECTAL RECTAL DAILY PRN
Status: DISCONTINUED | OUTPATIENT
Start: 2018-02-24 | End: 2018-03-01 | Stop reason: HOSPADM

## 2018-02-23 RX ORDER — HEPARIN SODIUM 5000 [USP'U]/ML
5000 INJECTION, SOLUTION INTRAVENOUS; SUBCUTANEOUS EVERY 8 HOURS
Status: DISCONTINUED | OUTPATIENT
Start: 2018-02-24 | End: 2018-03-01 | Stop reason: HOSPADM

## 2018-02-23 RX ORDER — POLYETHYLENE GLYCOL 3350 17 G/17G
17 POWDER, FOR SOLUTION ORAL DAILY PRN
Status: DISCONTINUED | OUTPATIENT
Start: 2018-02-24 | End: 2018-03-01 | Stop reason: HOSPADM

## 2018-02-23 RX ORDER — AMOXICILLIN 250 MG
1 CAPSULE ORAL DAILY
Status: DISCONTINUED | OUTPATIENT
Start: 2018-02-24 | End: 2018-03-01 | Stop reason: HOSPADM

## 2018-02-23 RX ORDER — PROMETHAZINE HYDROCHLORIDE 25 MG/1
25 SUPPOSITORY RECTAL EVERY 6 HOURS PRN
Status: DISCONTINUED | OUTPATIENT
Start: 2018-02-24 | End: 2018-03-01 | Stop reason: HOSPADM

## 2018-02-23 RX ORDER — SODIUM CHLORIDE 0.9 % (FLUSH) 0.9 %
3 SYRINGE (ML) INJECTION EVERY 8 HOURS
Status: DISCONTINUED | OUTPATIENT
Start: 2018-02-24 | End: 2018-03-01 | Stop reason: HOSPADM

## 2018-02-23 RX ORDER — LORAZEPAM 2 MG/ML
2 INJECTION INTRAMUSCULAR
Status: DISCONTINUED | OUTPATIENT
Start: 2018-02-24 | End: 2018-03-01 | Stop reason: HOSPADM

## 2018-02-23 RX ORDER — ASPIRIN 325 MG
325 TABLET ORAL
Status: COMPLETED | OUTPATIENT
Start: 2018-02-23 | End: 2018-02-23

## 2018-02-23 RX ORDER — NICARDIPINE HYDROCHLORIDE 0.2 MG/ML
2.5 INJECTION INTRAVENOUS CONTINUOUS
Status: DISCONTINUED | OUTPATIENT
Start: 2018-02-23 | End: 2018-02-23 | Stop reason: HOSPADM

## 2018-02-23 RX ORDER — NICARDIPINE HYDROCHLORIDE 0.2 MG/ML
INJECTION INTRAVENOUS
Status: DISCONTINUED
Start: 2018-02-23 | End: 2018-02-23 | Stop reason: HOSPADM

## 2018-02-23 RX ORDER — DEXTROMETHORPHAN POLISTIREX 30 MG/5 ML
1 SUSPENSION, EXTENDED RELEASE 12 HR ORAL DAILY PRN
Status: DISCONTINUED | OUTPATIENT
Start: 2018-02-24 | End: 2018-03-01 | Stop reason: HOSPADM

## 2018-02-23 RX ORDER — ONDANSETRON 2 MG/ML
8 INJECTION INTRAMUSCULAR; INTRAVENOUS EVERY 8 HOURS PRN
Status: DISCONTINUED | OUTPATIENT
Start: 2018-02-24 | End: 2018-03-01 | Stop reason: HOSPADM

## 2018-02-23 RX ORDER — RAMELTEON 8 MG/1
8 TABLET ORAL NIGHTLY PRN
Status: DISCONTINUED | OUTPATIENT
Start: 2018-02-24 | End: 2018-03-01 | Stop reason: HOSPADM

## 2018-02-23 RX ORDER — LORAZEPAM 2 MG/ML
INJECTION INTRAMUSCULAR
Status: DISPENSED
Start: 2018-02-23 | End: 2018-02-24

## 2018-02-23 RX ADMIN — ASPIRIN 325 MG ORAL TABLET 325 MG: 325 PILL ORAL at 05:02

## 2018-02-23 RX ADMIN — NICARDIPINE HYDROCHLORIDE 2.5 MG/HR: 0.2 INJECTION, SOLUTION INTRAVENOUS at 05:02

## 2018-02-23 RX ADMIN — NITROGLYCERIN 1 INCH: 20 OINTMENT TOPICAL at 05:02

## 2018-02-23 NOTE — ED NOTES
Neuro: AAOx3  Resp: Airway patent, respirations even/unlabored  Cardiac: Skin pink/warm/dry, pulses intact  Abdomen: Soft, non-tender to palpation, denies N/V/D  Musculoskeletal: Moves all extremities equally, ROM intact  Pt reports chest pain x's 1 week

## 2018-02-23 NOTE — ED PROVIDER NOTES
Encounter Date: 2/23/2018       History     Chief Complaint   Patient presents with    Chest Pain     x1 week     This is a 27-year-old female who comes in complaining of chest pain.  Patient reports that she's been having midsternal chest pain that has been constant since onset 4 days ago.  Pain does not radiate.  She does report that she is short of breath with it.  She denies any nausea or vomiting with it.  She denies any weakness or numbness.  She reports that she was at dialysis today and told her dialysis nurse who then referred her to the ER.  Patient reports that she was fully dialyzed today.  She has not noticed any change in the intensity of the pain or shortness of breath since dialysis.  Pain is unrelated to rest or exertion.  She denies any weakness, numbness, dizziness with it.  She reports that her blood pressure was normal when she was at dialysis.  She denies any lower extremity edema.  She denies any history of prolonged immobilization.  She denies any exacerbating or alleviating factors.          Review of patient's allergies indicates:   Allergen Reactions    Chloral hydrate      Other reaction(s): Hallucinations  Other reaction(s): Hives    Hydrocodone Other (See Comments)     Mental status changes     Past Medical History:   Diagnosis Date    Anemia in ESRD (end-stage renal disease) 10/12/2015    Chronic rejection of liver transplant 3/22/2016    ESRD on hemodialysis 9/30/2015    History of splenomegaly 4/12/2016    Immunosuppressed 8/5/2017    Iron deficiency anemia secondary to inadequate dietary iron intake 8/16/2017    She receives [facility] iron periodically at the D[facility].   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)    Moderate protein-calorie malnutrition 8/16/2017    MRSA bacteremia 8/6/2017    Prophylactic immunotherapy 8/4/2014    Renovascular hypertension 10/2/2015    Secondary hyperparathyroidism 8/5/2017    Thrombocytopenia  2016     Past Surgical History:   Procedure Laterality Date     SECTION      2     KIDNEY TRANSPLANT      LIVER BIOPSY      LIVER TRANSPLANT  1992    TUBAL LIGATION       Family History   Problem Relation Age of Onset    Hypertension Mother     Hypertension Father     Melanoma Neg Hx      Social History   Substance Use Topics    Smoking status: Never Smoker    Smokeless tobacco: Never Used    Alcohol use No     Review of Systems   Constitutional: Negative for chills and fever.   HENT: Negative for congestion and sore throat.    Respiratory: Positive for chest tightness and shortness of breath.    Cardiovascular: Positive for chest pain. Negative for palpitations.   Gastrointestinal: Negative for abdominal pain, nausea and vomiting.   Genitourinary: Negative for flank pain and hematuria.   Musculoskeletal: Negative for back pain and neck pain.   Neurological: Negative for dizziness, weakness and light-headedness.   All other systems reviewed and are negative.      Physical Exam     Initial Vitals [18 1720]   BP Pulse Resp Temp SpO2   (!) 179/118 87 16 99.3 °F (37.4 °C) 99 %      MAP       138.33         Physical Exam    Nursing note and vitals reviewed.  Constitutional: She appears well-developed and well-nourished. No distress.   HENT:   Head: Normocephalic and atraumatic.   Mouth/Throat: Oropharynx is clear and moist.   Eyes: Conjunctivae and EOM are normal. Pupils are equal, round, and reactive to light.   Neck: Normal range of motion. Neck supple.   Cardiovascular: Normal rate, regular rhythm and normal heart sounds.   Pulmonary/Chest: Breath sounds normal. She has no wheezes.   Abdominal: Soft. Bowel sounds are normal. There is no tenderness.   Musculoskeletal: Normal range of motion. She exhibits no edema or tenderness.   + thrill to AV fistula on left forearm   Lymphadenopathy:     She has no cervical adenopathy.   Neurological: She is alert and oriented to person, place, and  time. She has normal strength and normal reflexes.   Skin: Skin is warm and dry. Capillary refill takes less than 2 seconds.   Psychiatric: She has a normal mood and affect. Her behavior is normal.         ED Course   Critical Care  Date/Time: 2/23/2018 6:12 PM  Performed by: CAMI BENITEZ  Authorized by: CAMI BENITEZ   Direct patient critical care time: 15 minutes  Additional history critical care time: 5 minutes  Ordering / reviewing critical care time: 5 minutes  Documentation critical care time: 5 minutes  Consulting other physicians critical care time: 2 minutes  Total critical care time (exclusive of procedural time) : 32 minutes  Critical care time was exclusive of separately billable procedures and treating other patients and teaching time.  Critical care was necessary to treat or prevent imminent or life-threatening deterioration of the following conditions: cardiac failure and circulatory failure.  Critical care was time spent personally by me on the following activities: development of treatment plan with patient or surrogate, discussions with consultants, examination of patient, obtaining history from patient or surrogate, ordering and performing treatments and interventions, ordering and review of laboratory studies, ordering and review of radiographic studies, re-evaluation of patient's condition and review of old charts.        Labs Reviewed   ISTAT PROCEDURE - Abnormal; Notable for the following:        Result Value    POC PTWBT 14.4 (*)     All other components within normal limits   POCT CMP - Abnormal; Notable for the following:     Albumin, POC 2.8 (*)     Alkaline Phosphatase,  (*)     AST (SGOT), POC 56 (*)     POC BUN 23 (*)     POC Chloride 92 (*)     POC Creatinine 6.1 (*)     POC Potassium 3.1 (*)     All other components within normal limits   TROPONIN ISTAT   POCT URINE PREGNANCY   POCT CBC   POCT CMP   POCT PROTIME-INR   POCT TROPONIN     EKG Readings: (Independently  Interpreted)   EKG #1  Time: 1719  Rate: 87  NSR, no STEMI  Anterio-lateral T wave inversions and bi-phasic T waves inferiorly  This is changed from prior EKG.     EKG #2  Time: 2005  Rate: 87  NSR, no STEMI  Anterio-lateral T wave inversions, T wave inversions inferiorly - latter more pronounced than prior EKG          Medical Decision Making:   Initial Assessment:   This is a 27 year old female with history of ESRD on HD, HTN and a liver transplant as an infant comes in complaining of chest pain. On exam she is significantly hypertensive. On physical exam she has clear lungs. She has a regular rate and rhythm. She is neuro intact. She has no lower extremity edema. EKG, CBC, CMP, troponin, BNP, CXR were ordered. In light of significant elevation in her BP she was started on aspirin, nitrates and a Cardene drip for concern over hypertensive emergency. Cardene was titrated with goal of  20% MAP reduction.   Differential Diagnosis:   Hypertensive emergency,  ACS, pulmonary embolus, aortic dissection, unstable angina, GERD, esophagitis, pancreatitis, myocarditis, pericarditis  ED Management:  Patient's labs were significant for troponin of 0.08. Her BUN/Cr were elevated but she is on dialysis. K was 3.1 but patient just finished dialysis. Her CXR showed improved pulmonary edema with no clear infiltrate. She was re-evaluated after aspirin and nitrates and after Cardene and was much improved. She was chest pain free. Repeat EKG was mostly unchanged from prior. In light of concern for hypertensive emergency, she will be transferred to [facility]. Case was discussed and accepted by Dr. Sargent.                      Clinical Impression:   Diagnoses of Chest pain and Chest pain were pertinent to this visit.   Hypertensive emergency  EKG changes    Disposition:   Disposition: Transferred  Condition: Jesus Lake MD  02/23/18 2014

## 2018-02-24 PROBLEM — I16.1 HYPERTENSIVE EMERGENCY: Status: ACTIVE | Noted: 2018-02-23

## 2018-02-24 LAB
ALBUMIN SERPL BCP-MCNC: 2.6 G/DL
ALP SERPL-CCNC: 572 U/L
ALT SERPL W/O P-5'-P-CCNC: 36 U/L
ANION GAP SERPL CALC-SCNC: 11 MMOL/L
AST SERPL-CCNC: 44 U/L
BASOPHILS # BLD AUTO: 0.01 K/UL
BASOPHILS NFR BLD: 0.3 %
BILIRUB SERPL-MCNC: 0.4 MG/DL
BUN SERPL-MCNC: 31 MG/DL
CALCIUM SERPL-MCNC: 8.1 MG/DL
CHLORIDE SERPL-SCNC: 96 MMOL/L
CO2 SERPL-SCNC: 27 MMOL/L
CREAT SERPL-MCNC: 7.6 MG/DL
DIFFERENTIAL METHOD: ABNORMAL
EOSINOPHIL # BLD AUTO: 0.5 K/UL
EOSINOPHIL NFR BLD: 15.7 %
ERYTHROCYTE [DISTWIDTH] IN BLOOD BY AUTOMATED COUNT: 16.7 %
EST. GFR  (AFRICAN AMERICAN): 8 ML/MIN/1.73 M^2
EST. GFR  (NON AFRICAN AMERICAN): 7 ML/MIN/1.73 M^2
GLUCOSE SERPL-MCNC: 97 MG/DL
HCT VFR BLD AUTO: 24.8 %
HGB BLD-MCNC: 8.2 G/DL
LYMPHOCYTES # BLD AUTO: 0.8 K/UL
LYMPHOCYTES NFR BLD: 22 %
MAGNESIUM SERPL-MCNC: 1.8 MG/DL
MCH RBC QN AUTO: 25.1 PG
MCHC RBC AUTO-ENTMCNC: 33.1 G/DL
MCV RBC AUTO: 76 FL
MONOCYTES # BLD AUTO: 0.2 K/UL
MONOCYTES NFR BLD: 6.4 %
NEUTROPHILS # BLD AUTO: 1.9 K/UL
NEUTROPHILS NFR BLD: 55.9 %
PHOSPHATE SERPL-MCNC: 5.3 MG/DL
PLATELET # BLD AUTO: 82 K/UL
PMV BLD AUTO: ABNORMAL FL
POTASSIUM SERPL-SCNC: 3.4 MMOL/L
PROT SERPL-MCNC: 6.8 G/DL
RBC # BLD AUTO: 3.27 M/UL
SODIUM SERPL-SCNC: 134 MMOL/L
TROPONIN I SERPL DL<=0.01 NG/ML-MCNC: 0.07 NG/ML
TROPONIN I SERPL DL<=0.01 NG/ML-MCNC: 0.08 NG/ML
WBC # BLD AUTO: 3.45 K/UL

## 2018-02-24 PROCEDURE — 83735 ASSAY OF MAGNESIUM: CPT

## 2018-02-24 PROCEDURE — 99291 CRITICAL CARE FIRST HOUR: CPT | Mod: ,,, | Performed by: INTERNAL MEDICINE

## 2018-02-24 PROCEDURE — 63600175 PHARM REV CODE 636 W HCPCS: Performed by: INTERNAL MEDICINE

## 2018-02-24 PROCEDURE — 25000003 PHARM REV CODE 250: Performed by: HOSPITALIST

## 2018-02-24 PROCEDURE — 84100 ASSAY OF PHOSPHORUS: CPT

## 2018-02-24 PROCEDURE — 20000000 HC ICU ROOM

## 2018-02-24 PROCEDURE — 99222 1ST HOSP IP/OBS MODERATE 55: CPT | Mod: ,,, | Performed by: PSYCHIATRY & NEUROLOGY

## 2018-02-24 PROCEDURE — 63600175 PHARM REV CODE 636 W HCPCS: Performed by: HOSPITALIST

## 2018-02-24 PROCEDURE — 36415 COLL VENOUS BLD VENIPUNCTURE: CPT

## 2018-02-24 PROCEDURE — 25000003 PHARM REV CODE 250: Performed by: INTERNAL MEDICINE

## 2018-02-24 PROCEDURE — 80053 COMPREHEN METABOLIC PANEL: CPT

## 2018-02-24 PROCEDURE — 84484 ASSAY OF TROPONIN QUANT: CPT

## 2018-02-24 PROCEDURE — A4216 STERILE WATER/SALINE, 10 ML: HCPCS | Performed by: HOSPITALIST

## 2018-02-24 PROCEDURE — 85025 COMPLETE CBC W/AUTO DIFF WBC: CPT

## 2018-02-24 RX ORDER — FAMOTIDINE 20 MG/1
20 TABLET, FILM COATED ORAL 2 TIMES DAILY
Status: DISCONTINUED | OUTPATIENT
Start: 2018-02-24 | End: 2018-02-24

## 2018-02-24 RX ORDER — LEVETIRACETAM 10 MG/ML
1000 INJECTION INTRAVASCULAR ONCE
Status: COMPLETED | OUTPATIENT
Start: 2018-02-24 | End: 2018-02-24

## 2018-02-24 RX ORDER — CINACALCET 30 MG/1
30 TABLET, FILM COATED ORAL
Status: DISCONTINUED | OUTPATIENT
Start: 2018-02-24 | End: 2018-03-01 | Stop reason: HOSPADM

## 2018-02-24 RX ORDER — NAPROXEN SODIUM 220 MG/1
81 TABLET, FILM COATED ORAL DAILY
Status: DISCONTINUED | OUTPATIENT
Start: 2018-02-24 | End: 2018-03-01 | Stop reason: HOSPADM

## 2018-02-24 RX ORDER — TACROLIMUS 1 MG/1
5 CAPSULE ORAL 2 TIMES DAILY
Status: DISCONTINUED | OUTPATIENT
Start: 2018-02-24 | End: 2018-03-01 | Stop reason: HOSPADM

## 2018-02-24 RX ORDER — HYDRALAZINE HYDROCHLORIDE 20 MG/ML
5 INJECTION INTRAMUSCULAR; INTRAVENOUS EVERY 8 HOURS PRN
Status: DISCONTINUED | OUTPATIENT
Start: 2018-02-24 | End: 2018-02-24

## 2018-02-24 RX ORDER — HYDRALAZINE HYDROCHLORIDE 25 MG/1
25 TABLET, FILM COATED ORAL EVERY 12 HOURS
Status: DISCONTINUED | OUTPATIENT
Start: 2018-02-24 | End: 2018-02-24

## 2018-02-24 RX ORDER — CARVEDILOL 6.25 MG/1
6.25 TABLET ORAL 2 TIMES DAILY
Status: DISCONTINUED | OUTPATIENT
Start: 2018-02-24 | End: 2018-02-25

## 2018-02-24 RX ORDER — HYDRALAZINE HYDROCHLORIDE 20 MG/ML
5 INJECTION INTRAMUSCULAR; INTRAVENOUS EVERY 8 HOURS PRN
Status: DISCONTINUED | OUTPATIENT
Start: 2018-02-24 | End: 2018-02-25

## 2018-02-24 RX ORDER — LEVETIRACETAM 500 MG/1
500 TABLET ORAL 2 TIMES DAILY
Status: DISCONTINUED | OUTPATIENT
Start: 2018-02-24 | End: 2018-03-01 | Stop reason: HOSPADM

## 2018-02-24 RX ORDER — PREDNISONE 1 MG/1
1 TABLET ORAL EVERY OTHER DAY
Status: DISCONTINUED | OUTPATIENT
Start: 2018-02-24 | End: 2018-03-01 | Stop reason: HOSPADM

## 2018-02-24 RX ORDER — SODIUM CHLORIDE 9 MG/ML
INJECTION, SOLUTION INTRAVENOUS
Status: DISCONTINUED | OUTPATIENT
Start: 2018-02-24 | End: 2018-02-28

## 2018-02-24 RX ORDER — NICARDIPINE HYDROCHLORIDE 0.2 MG/ML
1 INJECTION INTRAVENOUS CONTINUOUS
Status: DISCONTINUED | OUTPATIENT
Start: 2018-02-24 | End: 2018-02-24

## 2018-02-24 RX ORDER — OXYCODONE HYDROCHLORIDE 5 MG/1
5 TABLET ORAL EVERY 4 HOURS PRN
Status: DISCONTINUED | OUTPATIENT
Start: 2018-02-24 | End: 2018-02-24

## 2018-02-24 RX ORDER — PANTOPRAZOLE SODIUM 40 MG/1
40 TABLET, DELAYED RELEASE ORAL DAILY
Status: DISCONTINUED | OUTPATIENT
Start: 2018-02-24 | End: 2018-03-01 | Stop reason: HOSPADM

## 2018-02-24 RX ORDER — CLONIDINE 0.2 MG/24H
1 PATCH, EXTENDED RELEASE TRANSDERMAL
Status: DISCONTINUED | OUTPATIENT
Start: 2018-02-24 | End: 2018-02-25

## 2018-02-24 RX ADMIN — ASPIRIN 81 MG 81 MG: 81 TABLET ORAL at 08:02

## 2018-02-24 RX ADMIN — HEPARIN SODIUM 5000 UNITS: 5000 INJECTION, SOLUTION INTRAVENOUS; SUBCUTANEOUS at 05:02

## 2018-02-24 RX ADMIN — NICARDIPINE HYDROCHLORIDE 2.5 MG/HR: 0.2 INJECTION, SOLUTION INTRAVENOUS at 12:02

## 2018-02-24 RX ADMIN — HYDRALAZINE HYDROCHLORIDE 5 MG: 20 INJECTION INTRAMUSCULAR; INTRAVENOUS at 09:02

## 2018-02-24 RX ADMIN — PREDNISONE 1 MG: 1 TABLET ORAL at 08:02

## 2018-02-24 RX ADMIN — HEPARIN SODIUM 5000 UNITS: 5000 INJECTION, SOLUTION INTRAVENOUS; SUBCUTANEOUS at 09:02

## 2018-02-24 RX ADMIN — PANTOPRAZOLE SODIUM 40 MG: 40 TABLET, DELAYED RELEASE ORAL at 08:02

## 2018-02-24 RX ADMIN — LEVETIRACETAM 500 MG: 500 TABLET, FILM COATED ORAL at 09:02

## 2018-02-24 RX ADMIN — HEPARIN SODIUM 5000 UNITS: 5000 INJECTION, SOLUTION INTRAVENOUS; SUBCUTANEOUS at 02:02

## 2018-02-24 RX ADMIN — TACROLIMUS 5 MG: 1 CAPSULE ORAL at 05:02

## 2018-02-24 RX ADMIN — RAMELTEON 8 MG: 8 TABLET, FILM COATED ORAL at 01:02

## 2018-02-24 RX ADMIN — TACROLIMUS 5 MG: 1 CAPSULE ORAL at 08:02

## 2018-02-24 RX ADMIN — SODIUM CHLORIDE, PRESERVATIVE FREE 3 ML: 5 INJECTION INTRAVENOUS at 09:02

## 2018-02-24 RX ADMIN — CINACALCET HYDROCHLORIDE 30 MG: 30 TABLET, COATED ORAL at 08:02

## 2018-02-24 RX ADMIN — HYDRALAZINE HYDROCHLORIDE 5 MG: 20 INJECTION INTRAMUSCULAR; INTRAVENOUS at 03:02

## 2018-02-24 RX ADMIN — SODIUM CHLORIDE, PRESERVATIVE FREE 3 ML: 5 INJECTION INTRAVENOUS at 05:02

## 2018-02-24 RX ADMIN — CLONIDINE 1 PATCH: 0.2 PATCH TRANSDERMAL at 08:02

## 2018-02-24 RX ADMIN — CARVEDILOL 6.25 MG: 6.25 TABLET, FILM COATED ORAL at 09:02

## 2018-02-24 RX ADMIN — SODIUM CHLORIDE, PRESERVATIVE FREE 3 ML: 5 INJECTION INTRAVENOUS at 02:02

## 2018-02-24 RX ADMIN — LEVETIRACETAM 1000 MG: 10 INJECTION INTRAVENOUS at 12:02

## 2018-02-24 RX ADMIN — DOCUSATE SODIUM AND SENNOSIDES 1 TABLET: 8.6; 5 TABLET, FILM COATED ORAL at 08:02

## 2018-02-24 RX ADMIN — HYDRALAZINE HYDROCHLORIDE 25 MG: 25 TABLET ORAL at 08:02

## 2018-02-24 NOTE — PLAN OF CARE
Problem: Patient Care Overview  Goal: Plan of Care Review  Outcome: Ongoing (interventions implemented as appropriate)  Remains in ICU.  Weaned off Cardene drip keeping SBP <170.  AAOx3.  MAEW.  Passed 1 BM.  No seizure activity this shift.  Fall & pressure ulcer prevention interventions on-going.

## 2018-02-24 NOTE — HPI
27-year-old female who comes in complaining of chest pain.  Patient reports that she's been having midsternal chest pain that has been constant since onset 4 days ago.  Pain does not radiate.  She does report that she is short of breath with it.  She denies any nausea or vomiting with it.  She denies any weakness or numbness.  She reports that she was at dialysis today and told her dialysis nurse who then referred her to the ER.  Patient reports that she was fully dialyzed today.  She has not noticed any change in the intensity of the pain or shortness of breath since dialysis.  Pain is unrelated to rest or exertion.  She denies any weakness, numbness, dizziness with it.  She reports that her blood pressure was normal when she was at dialysis.  She denies any lower extremity edema.  She denies any history of prolonged immobilization.  She denies any exacerbating or alleviating factors.     Known to our service from previous hospitalization.  She was recently seen as soon as 5 days ago.  She underwent diagnostic testing including echo nuclear stress which were within normal limits.  Chest pain is currently resolved.  Her blood pressures a little bit more stable is now off Cardene drip.  She denies any PND, orthopnea or lower edema.  She's not expressing dizziness, presyncope or syncope.

## 2018-02-24 NOTE — CONSULTS
[facility]  Neurology  Consult Note     Patient Name: Holly Patel  MRN: [id number]  Admission Date: 2018  Hospital Length of Stay: 1 days  Code Status: Full Code   Attending Provider: Marilu Pinedo MD   Consulting Provider: Magdy Harper MD  Primary Care Physician: Stan Sosa MD  Principal Problem:Hypertensive emergency     Consults  Subjective:      Chief Complaint:  Seizures      HPI: 26 y/o female with medical Hx as listed below comes to ED for chest pain. Pt has been consulted for seizures. She was recently D/C from hospital for hypertensive crisis. Pt had several seizures during her stay at his facility. She has been on levetiracetam 500 mg twice daily. While waiting in ED pt had a seizure. NO other episodes reported. Complains of generalized weakness but no numbness, visual or speech disturbances.          Past Medical History:   Diagnosis Date    Anemia in ESRD (end-stage renal disease) 10/12/2015    Chronic rejection of liver transplant 3/22/2016    Encounter for blood transfusion      ESRD on hemodialysis 2015    History of splenomegaly 2016    Immunosuppressed 2017    Iron deficiency anemia secondary to inadequate dietary iron intake 2017     She receives IV iron periodically at the Dialysis Center.   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     hemangioendothelioma s/p LTx ()    Moderate protein-calorie malnutrition 2017    MRSA bacteremia 2017    Prophylactic immunotherapy 2014    Renovascular hypertension 10/2/2015    Secondary hyperparathyroidism 2017    Thrombocytopenia 2016               Past Surgical History:   Procedure Laterality Date     SECTION         2     KIDNEY TRANSPLANT        LIVER BIOPSY        LIVER TRANSPLANT   1992    TUBAL LIGATION                   Review of patient's allergies indicates:   Allergen Reactions    Chloral hydrate         Other reaction(s):  Hallucinations  Other reaction(s): Hives    Hydrocodone Other (See Comments)       Mental status changes         Current Neurological Medications:          Current Facility-Administered Medications on File Prior to Encounter   Medication    [COMPLETED] aspirin tablet 325 mg    [COMPLETED] nitroGLYCERIN 2% TD oint ointment 1 inch    [DISCONTINUED] niCARdipine (CARDENE) 40 mg/200 mL infusion    [DISCONTINUED] niCARdipine 40 mg/200 mL infusion           Current Outpatient Prescriptions on File Prior to Encounter   Medication Sig    aspirin 81 MG Chew Take 1 tablet (81 mg total) by mouth once daily.    cinacalcet (SENSIPAR) 30 MG Tab Take 1 tablet (30 mg total) by mouth daily with breakfast.    cloNIDine 0.2 mg/24 hr td ptwk (CATAPRES) 0.2 mg/24 hr Place 1 patch onto the skin every 7 days. Change every Friday    famotidine (PEPCID) 20 MG tablet Take 1 tablet (20 mg total) by mouth 2 (two) times daily.    food supplemt, lactose-reduced (ENSURE ACTIVE HIGH PROTEIN) Liqd Take 236 mLs by mouth 2 (two) times daily.    furosemide (LASIX) 20 MG tablet Take 5 tablets (100 mg total) by mouth 2 (two) times daily.    hydrALAZINE (APRESOLINE) 25 MG tablet Take 1 tablet (25 mg total) by mouth every 12 (twelve) hours.    labetalol (NORMODYNE) 200 MG tablet Take 4 tablets (800 mg total) by mouth every 8 (eight) hours.    NIFEdipine (PROCARDIA-XL) 30 MG (OSM) 24 hr tablet Take 2 tablets (60 mg total) by mouth once daily.    ondansetron (ZOFRAN) 4 MG tablet Take 1 tablet (4 mg total) by mouth every 6 (six) hours.    oxyCODONE (ROXICODONE) 5 MG immediate release tablet Take 1 tablet (5 mg total) by mouth every 4 (four) hours as needed for Pain.    predniSONE (DELTASONE) 1 MG tablet Take 1 mg by mouth every other day.    tacrolimus (PROGRAF) 1 MG Cap Take 5 capsules (5 mg total) by mouth every 12 (twelve) hours.    triamcinolone acetonide 0.1% (KENALOG) 0.1 % ointment AAA on arms, legs, and neck bid x 1-2 wks then prn  flares only (Patient taking differently: Apply to affected area(s) on arms, legs, and neck twice daily x 1-2 wks then as needed for flares only)           Family History      Problem Relation (Age of Onset)     Hypertension Mother, Father                Social History Main Topics    Smoking status: Never Smoker    Smokeless tobacco: Never Used    Alcohol use No    Drug use: No    Sexual activity: Yes       Partners: Male      Review of Systems   Constitutional: Negative for fever.   HENT: Negative for trouble swallowing.    Eyes: Negative for photophobia.   Respiratory: Negative for shortness of breath.    Cardiovascular: Negative for chest pain.   Gastrointestinal: Negative for abdominal pain.   Genitourinary: Negative for dysuria.   Musculoskeletal: Negative for back pain.   Neurological: Negative for headaches.   Psychiatric/Behavioral: Negative for hallucinations.      Objective:      Vital Signs (Most Recent):  Temp: 99 °F (37.2 °C) (02/24/18 1230)  Pulse: 84 (02/24/18 1317)  Resp: 18 (02/24/18 1317)  BP: (!) 150/100 (02/24/18 1302)  SpO2: 99 % (02/24/18 1317) Vital Signs (24h Range):  Temp:  [98.6 °F (37 °C)-99.3 °F (37.4 °C)] 99 °F (37.2 °C)  Pulse:  [79-94] 84  Resp:  [12-84] 18  SpO2:  [71 %-100 %] 99 %  BP: (121-185)/() 150/100      Weight: 51.4 kg (113 lb 5.1 oz)  Body mass index is 18.86 kg/m².     Physical Exam  Constitutional: She is oriented to person, place, and time. No distress.   HENT:   Head: Normocephalic.   Eyes: Right eye exhibits no discharge. Left eye exhibits no discharge.   Neck: Normal range of motion.   Cardiovascular: Normal rate.    Pulmonary/Chest: Breath sounds normal.   Abdominal: Bowel sounds are normal.   Musculoskeletal: She exhibits no deformity.   Neurological: She is oriented to person, place, and time. She has normal strength.   Psychiatric: Her speech is normal.         NEUROLOGICAL EXAMINATION:      MENTAL STATUS   Oriented to person, place, and time.   Speech:  speech is normal   Level of consciousness: alert     CRANIAL NERVES      CN III, IV, VI   Right pupil: Size: 2 mm. Shape: regular.   Left pupil: Size: 2 mm. Shape: regular.   Nystagmus: none   Ophthalmoparesis: none     CN XII   Tongue deviation: none     MOTOR EXAM      Strength   Strength 5/5 throughout.      GAIT AND COORDINATION      Tremor   Resting tremor: absent        Significant Labs:   CBC:   Recent Labs  Lab 02/24/18  0247   WBC 3.45*   HGB 8.2*   HCT 24.8*   PLT 82*      CMP:   Recent Labs  Lab 02/24/18 0247   GLU 97   *   K 3.4*   CL 96   CO2 27   BUN 31*   CREATININE 7.6*   CALCIUM 8.1*   MG 1.8   PROT 6.8   ALBUMIN 2.6*   BILITOT 0.4   ALKPHOS 572*   AST 44*   ALT 36   ANIONGAP 11   EGFRNONAA 7*         Significant Imaging:  MRI brain  1. Right frontal lobe subcortical chronic white changes either from vasculitis or nonspecific areas of gliosis or related to migraine syndromes.  2. MRI of the brain otherwise is unremarkable.      Electronically signed by: MAGGIE SUÁREZ MD  Date: 02/22/18  Time: 13:47         Assessment and Plan:      26 y/o female consulted for seizures     1. Seizures: could be related to hypertension.           MRI of brain shows as a above no specific findings but given concern for vasculitis (see report) CTA of intracranial vessels can be obtained in the future (she does however has no fever or AMS.           -For now will continue levetiracetam 500 mg twice daily.             Active Diagnoses:     Diagnosis Date Noted POA    PRINCIPAL PROBLEM:  Hypertensive emergency [I16.1] 02/23/2018 Yes    Elevated troponin [R74.8] 02/16/2018 Yes    Seizure in response to acute event [R56.9] 02/16/2018 Yes    Immunosuppressed [D89.9] 08/05/2017 Yes       Chronic    Chronic rejection of liver transplant [T86.41] 03/22/2016 Yes       Chronic    Anemia in ESRD (end-stage renal disease) [N18.6, D63.1] 10/12/2015 Yes       Chronic    ESRD on hemodialysis [N18.6, Z99.2] 09/30/2015 Not  Applicable       Chronic       Problems Resolved During this Admission:     Diagnosis Date Noted Date Resolved POA    Febrile illness [R50.9] 08/05/2017 08/06/2017 Yes               VTE Risk Mitigation          Ordered       heparin (porcine) injection 5,000 Units  Every 8 hours     Route:  Subcutaneous        02/23/18 2339       Medium Risk of VTE  Once      02/23/18 2339             Thank you for your consult. I will follow-up with patient. Please contact us if you have any additional questions.     Magdy Harper MD  Neurology  [facility]

## 2018-02-24 NOTE — CONSULTS
[facility]  Cardiology  Consult Note    Patient Name: Holly Patel  MRN: [id number]  Admission Date: 2/23/2018  Hospital Length of Stay: 1 days  Code Status: Full Code   Attending Provider: Marilu Pinedo MD   Consulting Provider: Jim Lopez MD  Primary Care Physician: Stan Sosa MD  Principal Problem:Hypertensive emergency    Patient information was obtained from patient, past medical records and ER records.     Inpatient consult to Cardiology  Consult performed by: JIM LOPEZ  Consult ordered by: SHAMA QUEEN  Reason for consult: Hypertensive crisis        Subjective:     Chief Complaint:  Chest pain     HPI:   27-year-old female who comes in complaining of chest pain.  Patient reports that she's been having midsternal chest pain that has been constant since onset 4 days ago.  Pain does not radiate.  She does report that she is short of breath with it.  She denies any nausea or vomiting with it.  She denies any weakness or numbness.  She reports that she was at dialysis today and told her dialysis nurse who then referred her to the ER.  Patient reports that she was fully dialyzed today.  She has not noticed any change in the intensity of the pain or shortness of breath since dialysis.  Pain is unrelated to rest or exertion.  She denies any weakness, numbness, dizziness with it.  She reports that her blood pressure was normal when she was at dialysis.  She denies any lower extremity edema.  She denies any history of prolonged immobilization.  She denies any exacerbating or alleviating factors.     Known to our service from previous hospitalization.  She was recently seen as soon as 5 days ago.  She underwent diagnostic testing including echo nuclear stress which were within normal limits.  Chest pain is currently resolved.  Her blood pressures a little bit more stable is now off Cardene drip.  She denies any PND, orthopnea or lower edema.  She's not expressing  dizziness, presyncope or syncope.    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 ()    Moderate protein-calorie malnutrition 2017    MRSA bacteremia 2017    Prophylactic immunotherapy 2014    Renovascular hypertension 10/2/2015    Secondary hyperparathyroidism 2017    Thrombocytopenia 2016       Past Surgical History:   Procedure Laterality Date     SECTION      2     KIDNEY TRANSPLANT      LIVER BIOPSY      LIVER TRANSPLANT  1992    TUBAL LIGATION         Review of patient's allergies indicates:   Allergen Reactions    Chloral hydrate      Other reaction(s): Hallucinations  Other reaction(s): Hives    Hydrocodone Other (See Comments)     Mental status changes       Current Facility-Administered Medications on File Prior to Encounter   Medication    [COMPLETED] aspirin tablet 325 mg    [COMPLETED] nitroGLYCERIN 2% TD oint ointment 1 inch    [DISCONTINUED] niCARdipine (CARDENE) 40 mg/200 mL infusion    [DISCONTINUED] niCARdipine 40 mg/200 mL infusion     Current Outpatient Prescriptions on File Prior to Encounter   Medication Sig    aspirin 81 MG Chew Take 1 tablet (81 mg total) by mouth once daily.    cinacalcet (SENSIPAR) 30 MG Tab Take 1 tablet (30 mg total) by mouth daily with breakfast.    cloNIDine 0.2 mg/24 hr td ptwk (CATAPRES) 0.2 mg/24 hr Place 1 patch onto the skin every 7 days. Change every Friday    famotidine (PEPCID) 20 MG tablet Take 1 tablet (20 mg total) by mouth 2 (two) times daily.     food supplemt, lactose-reduced (ENSURE ACTIVE HIGH PROTEIN) Liqd Take 236 mLs by mouth 2 (two) times daily.    furosemide (LASIX) 20 MG tablet Take 5 tablets (100 mg total) by mouth 2 (two) times daily.    hydrALAZINE (APRESOLINE) 25 MG tablet Take 1 tablet (25 mg total) by mouth every 12 (twelve) hours.    labetalol (NORMODYNE) 200 MG tablet Take 4 tablets (800 mg total) by mouth every 8 (eight) hours.    NIFEdipine (PROCARDIA-XL) 30 MG (OSM) 24 hr tablet Take 2 tablets (60 mg total) by mouth once daily.    ondansetron (ZOFRAN) 4 MG tablet Take 1 tablet (4 mg total) by mouth every 6 (six) hours.    oxyCODONE (ROXICODONE) 5 MG immediate release tablet Take 1 tablet (5 mg total) by mouth every 4 (four) hours as needed for Pain.    predniSONE (DELTASONE) 1 MG tablet Take 1 mg by mouth every other day.    tacrolimus (PROGRAF) 1 MG Cap Take 5 capsules (5 mg total) by mouth every 12 (twelve) hours.    triamcinolone acetonide 0.1% (KENALOG) 0.1 % ointment AAA on arms, legs, and neck bid x 1-2 wks then prn flares only (Patient taking differently: Apply to affected area(s) on arms, legs, and neck twice daily x 1-2 wks then as needed for flares only)     Family History     Problem Relation (Age of Onset)    Hypertension Mother, Father        Social History Main Topics    Smoking status: Never Smoker    Smokeless tobacco: Never Used    Alcohol use No    Drug use: No    Sexual activity: Yes     Partners: Male     Review of Systems   Constitution: Negative.   HENT: Negative.    Eyes: Negative.    Cardiovascular: Positive for chest pain. Negative for dyspnea on exertion, irregular heartbeat, leg swelling, near-syncope, orthopnea, palpitations, paroxysmal nocturnal dyspnea and syncope.   Respiratory: Negative for shortness of breath.    Skin: Negative.    Musculoskeletal: Negative.    Gastrointestinal: Negative for abdominal pain, constipation and diarrhea.   Genitourinary: Negative for dysuria.   Neurological:  Negative.    Psychiatric/Behavioral: Negative.      Objective:     Vital Signs (Most Recent):  Temp: 98.8 °F (37.1 °C) (02/24/18 1530)  Pulse: 88 (02/24/18 1700)  Resp: 15 (02/24/18 1700)  BP: (!) 170/111 (02/24/18 1632)  SpO2: 100 % (02/24/18 1700) Vital Signs (24h Range):  Temp:  [98.6 °F (37 °C)-99.3 °F (37.4 °C)] 98.8 °F (37.1 °C)  Pulse:  [79-94] 88  Resp:  [12-84] 15  SpO2:  [71 %-100 %] 100 %  BP: (121-185)/() 170/111     Weight: 51.4 kg (113 lb 5.1 oz)  Body mass index is 18.86 kg/m².    SpO2: 100 %  O2 Device (Oxygen Therapy): room air      Intake/Output Summary (Last 24 hours) at 02/24/18 1705  Last data filed at 02/24/18 1200   Gross per 24 hour   Intake              800 ml   Output                0 ml   Net              800 ml       Lines/Drains/Airways     Drain                 Hemodialysis AV Fistula Left forearm -- days          Peripheral Intravenous Line                 Peripheral IV - Single Lumen 02/23/18 1737 Right Forearm less than 1 day         Peripheral IV - Single Lumen 02/23/18 1747 Right Hand less than 1 day                Physical Exam   Constitutional: She is oriented to person, place, and time. She appears well-developed and well-nourished.   HENT:   Head: Normocephalic and atraumatic.   Eyes: Conjunctivae and EOM are normal. Pupils are equal, round, and reactive to light.   Neck: Normal range of motion. Neck supple. No thyromegaly present.   Cardiovascular: Normal rate and regular rhythm.    No murmur heard.  Pulmonary/Chest: Effort normal and breath sounds normal. No respiratory distress.   Abdominal: Soft. Bowel sounds are normal.   Musculoskeletal: She exhibits no edema.   Neurological: She is alert and oriented to person, place, and time.   Skin: Skin is warm and dry.   Psychiatric: She has a normal mood and affect. Her behavior is normal.       Significant Labs:   CMP   Recent Labs  Lab 02/24/18  0247   *   K 3.4*   CL 96   CO2 27   GLU 97   BUN 31*   CREATININE 7.6*    CALCIUM 8.1*   PROT 6.8   ALBUMIN 2.6*   BILITOT 0.4   ALKPHOS 572*   AST 44*   ALT 36   ANIONGAP 11   ESTGFRAFRICA 8*   EGFRNONAA 7*   , CBC   Recent Labs  Lab 02/24/18  0247   WBC 3.45*   HGB 8.2*   HCT 24.8*   PLT 82*   , INR No results for input(s): INR, PROTIME in the last 48 hours., Lipid Panel No results for input(s): CHOL, HDL, LDLCALC, TRIG, CHOLHDL in the last 48 hours. and Troponin   Recent Labs  Lab 02/24/18  0247 02/24/18  1032   TROPONINI 0.084* 0.073*       Significant Imaging: Echocardiogram:   2D echo with color flow doppler:   Results for orders placed or performed during the hospital encounter of 02/16/18   2D echo with color flow doppler   Result Value Ref Range    EF 65 55 - 65    Diastolic Dysfunction Yes (A)     Est. PA Systolic Pressure 44.24 (A)     Pericardial Effusion SMALL (A)     Mitral Valve Mobility NORMAL     Tricuspid Valve Regurgitation TRIVIAL      Assessment and Plan:     Hypertensive crisis    Resume previous home regimen  Titrate medicines as needed        ESRD on hemodialysis    Management per renal        Non compliance w medication regimen    Counseled on the importance        Elevated troponin    Likely secondary to demand  Recent ischemic workup within normal limits  No further testing planned            VTE Risk Mitigation         Ordered     heparin (porcine) injection 5,000 Units  Every 8 hours     Route:  Subcutaneous        02/23/18 2339     Medium Risk of VTE  Once      02/23/18 2339        *Total ICD time spent on case greater than 30 minutes    Thank you for your consult. I will follow-up with patient. Please contact us if you have any additional questions.    Jim Jacob MD  Cardiology   [facility]

## 2018-02-24 NOTE — SUBJECTIVE & OBJECTIVE
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 ()    Moderate protein-calorie malnutrition 2017    MRSA bacteremia 2017    Prophylactic immunotherapy 2014    Renovascular hypertension 10/2/2015    Secondary hyperparathyroidism 2017    Thrombocytopenia 2016       Past Surgical History:   Procedure Laterality Date     SECTION      2     KIDNEY TRANSPLANT      LIVER BIOPSY      LIVER TRANSPLANT  1992    TUBAL LIGATION         Review of patient's allergies indicates:   Allergen Reactions    Chloral hydrate      Other reaction(s): Hallucinations  Other reaction(s): Hives    Hydrocodone Other (See Comments)     Mental status changes       Current Facility-Administered Medications on File Prior to Encounter   Medication    [COMPLETED] aspirin tablet 325 mg    [COMPLETED] nitroGLYCERIN 2% TD oint ointment 1 inch    [DISCONTINUED] niCARdipine (CARDENE) 40 mg/200 mL infusion    [DISCONTINUED] niCARdipine 40 mg/200 mL infusion     Current Outpatient Prescriptions on File Prior to Encounter   Medication Sig    aspirin 81 MG Chew Take 1 tablet (81 mg total) by mouth once daily.    cinacalcet (SENSIPAR) 30 MG Tab Take 1 tablet (30 mg total) by mouth daily with breakfast.    cloNIDine 0.2 mg/24 hr td ptwk (CATAPRES) 0.2 mg/24 hr Place 1 patch onto the skin every 7 days. Change every Friday    famotidine (PEPCID) 20 MG tablet Take 1 tablet (20 mg total) by mouth 2 (two) times daily.    food supplemt, lactose-reduced (ENSURE  ACTIVE HIGH PROTEIN) Liqd Take 236 mLs by mouth 2 (two) times daily.    furosemide (LASIX) 20 MG tablet Take 5 tablets (100 mg total) by mouth 2 (two) times daily.    hydrALAZINE (APRESOLINE) 25 MG tablet Take 1 tablet (25 mg total) by mouth every 12 (twelve) hours.    labetalol (NORMODYNE) 200 MG tablet Take 4 tablets (800 mg total) by mouth every 8 (eight) hours.    NIFEdipine (PROCARDIA-XL) 30 MG (OSM) 24 hr tablet Take 2 tablets (60 mg total) by mouth once daily.    ondansetron (ZOFRAN) 4 MG tablet Take 1 tablet (4 mg total) by mouth every 6 (six) hours.    oxyCODONE (ROXICODONE) 5 MG immediate release tablet Take 1 tablet (5 mg total) by mouth every 4 (four) hours as needed for Pain.    predniSONE (DELTASONE) 1 MG tablet Take 1 mg by mouth every other day.    tacrolimus (PROGRAF) 1 MG Cap Take 5 capsules (5 mg total) by mouth every 12 (twelve) hours.    triamcinolone acetonide 0.1% (KENALOG) 0.1 % ointment AAA on arms, legs, and neck bid x 1-2 wks then prn flares only (Patient taking differently: Apply to affected area(s) on arms, legs, and neck twice daily x 1-2 wks then as needed for flares only)     Family History     Problem Relation (Age of Onset)    Hypertension Mother, Father        Social History Main Topics    Smoking status: Never Smoker    Smokeless tobacco: Never Used    Alcohol use No    Drug use: No    Sexual activity: Yes     Partners: Male     Review of Systems   Constitution: Negative.   HENT: Negative.    Eyes: Negative.    Cardiovascular: Positive for chest pain. Negative for dyspnea on exertion, irregular heartbeat, leg swelling, near-syncope, orthopnea, palpitations, paroxysmal nocturnal dyspnea and syncope.   Respiratory: Negative for shortness of breath.    Skin: Negative.    Musculoskeletal: Negative.    Gastrointestinal: Negative for abdominal pain, constipation and diarrhea.   Genitourinary: Negative for dysuria.   Neurological: Negative.    Psychiatric/Behavioral:  Negative.      Objective:     Vital Signs (Most Recent):  Temp: 98.8 °F (37.1 °C) (02/24/18 1530)  Pulse: 88 (02/24/18 1700)  Resp: 15 (02/24/18 1700)  BP: (!) 170/111 (02/24/18 1632)  SpO2: 100 % (02/24/18 1700) Vital Signs (24h Range):  Temp:  [98.6 °F (37 °C)-99.3 °F (37.4 °C)] 98.8 °F (37.1 °C)  Pulse:  [79-94] 88  Resp:  [12-84] 15  SpO2:  [71 %-100 %] 100 %  BP: (121-185)/() 170/111     Weight: 51.4 kg (113 lb 5.1 oz)  Body mass index is 18.86 kg/m².    SpO2: 100 %  O2 Device (Oxygen Therapy): room air      Intake/Output Summary (Last 24 hours) at 02/24/18 1705  Last data filed at 02/24/18 1200   Gross per 24 hour   Intake              800 ml   Output                0 ml   Net              800 ml       Lines/Drains/Airways     Drain                 Hemodialysis AV Fistula Left forearm -- days          Peripheral Intravenous Line                 Peripheral IV - Single Lumen 02/23/18 1737 Right Forearm less than 1 day         Peripheral IV - Single Lumen 02/23/18 1747 Right Hand less than 1 day                Physical Exam   Constitutional: She is oriented to person, place, and time. She appears well-developed and well-nourished.   HENT:   Head: Normocephalic and atraumatic.   Eyes: Conjunctivae and EOM are normal. Pupils are equal, round, and reactive to light.   Neck: Normal range of motion. Neck supple. No thyromegaly present.   Cardiovascular: Normal rate and regular rhythm.    No murmur heard.  Pulmonary/Chest: Effort normal and breath sounds normal. No respiratory distress.   Abdominal: Soft. Bowel sounds are normal.   Musculoskeletal: She exhibits no edema.   Neurological: She is alert and oriented to person, place, and time.   Skin: Skin is warm and dry.   Psychiatric: She has a normal mood and affect. Her behavior is normal.       Significant Labs:   CMP   Recent Labs  Lab 02/24/18  0247   *   K 3.4*   CL 96   CO2 27   GLU 97   BUN 31*   CREATININE 7.6*   CALCIUM 8.1*   PROT 6.8   ALBUMIN  2.6*   BILITOT 0.4   ALKPHOS 572*   AST 44*   ALT 36   ANIONGAP 11   ESTGFRAFRICA 8*   EGFRNONAA 7*   , CBC   Recent Labs  Lab 02/24/18  0247   WBC 3.45*   HGB 8.2*   HCT 24.8*   PLT 82*   , INR No results for input(s): INR, PROTIME in the last 48 hours., Lipid Panel No results for input(s): CHOL, HDL, LDLCALC, TRIG, CHOLHDL in the last 48 hours. and Troponin   Recent Labs  Lab 02/24/18  0247 02/24/18  1032   TROPONINI 0.084* 0.073*       Significant Imaging: Echocardiogram:   2D echo with color flow doppler:   Results for orders placed or performed during the hospital encounter of 02/16/18   2D echo with color flow doppler   Result Value Ref Range    EF 65 55 - 65    Diastolic Dysfunction Yes (A)     Est. PA Systolic Pressure 44.24 (A)     Pericardial Effusion SMALL (A)     Mitral Valve Mobility NORMAL     Tricuspid Valve Regurgitation TRIVIAL

## 2018-02-24 NOTE — PROGRESS NOTES
[facility]  Neurology  Consult Note    Patient Name: Holly Patel  MRN: [id number]  Admission Date: 2018  Hospital Length of Stay: 1 days  Code Status: Full Code   Attending Provider: Marilu Pinedo MD   Consulting Provider: Magdy Harper MD  Primary Care Physician: Stan Sosa MD  Principal Problem:Hypertensive emergency    Consults  Subjective:     Chief Complaint:  Seizures     HPI: 28 y/o female with medical Hx as listed below comes to ED for chest pain. Pt has been consulted for seizures. She was recently D/C from hospital for hypertensive crisis. Pt had several seizures during her stay at his facility. She has been on levetiracetam 500 mg twice daily. While waiting in ED pt had a seizure. NO other episodes reported. Complains of generalized weakness but no numbness, visual or speech disturbances.    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 ()    Moderate protein-calorie malnutrition 2017    MRSA bacteremia 2017    Prophylactic immunotherapy 2014    Renovascular hypertension 10/2/2015    Secondary hyperparathyroidism 2017    Thrombocytopenia 2016       Past Surgical History:   Procedure Laterality Date     SECTION      2     KIDNEY TRANSPLANT      LIVER BIOPSY      LIVER TRANSPLANT  1992    TUBAL LIGATION         Review of patient's allergies indicates:   Allergen Reactions    Chloral hydrate      Other reaction(s): Hallucinations  Other reaction(s): Hives    Hydrocodone  Other (See Comments)     Mental status changes       Current Neurological Medications:     Current Facility-Administered Medications on File Prior to Encounter   Medication    [COMPLETED] aspirin tablet 325 mg    [COMPLETED] nitroGLYCERIN 2% TD oint ointment 1 inch    [DISCONTINUED] niCARdipine (CARDENE) 40 mg/200 mL infusion    [DISCONTINUED] niCARdipine 40 mg/200 mL infusion     Current Outpatient Prescriptions on File Prior to Encounter   Medication Sig    aspirin 81 MG Chew Take 1 tablet (81 mg total) by mouth once daily.    cinacalcet (SENSIPAR) 30 MG Tab Take 1 tablet (30 mg total) by mouth daily with breakfast.    cloNIDine 0.2 mg/24 hr td ptwk (CATAPRES) 0.2 mg/24 hr Place 1 patch onto the skin every 7 days. Change every Friday    famotidine (PEPCID) 20 MG tablet Take 1 tablet (20 mg total) by mouth 2 (two) times daily.    food supplemt, lactose-reduced (ENSURE ACTIVE HIGH PROTEIN) Liqd Take 236 mLs by mouth 2 (two) times daily.    furosemide (LASIX) 20 MG tablet Take 5 tablets (100 mg total) by mouth 2 (two) times daily.    hydrALAZINE (APRESOLINE) 25 MG tablet Take 1 tablet (25 mg total) by mouth every 12 (twelve) hours.    labetalol (NORMODYNE) 200 MG tablet Take 4 tablets (800 mg total) by mouth every 8 (eight) hours.    NIFEdipine (PROCARDIA-XL) 30 MG (OSM) 24 hr tablet Take 2 tablets (60 mg total) by mouth once daily.    ondansetron (ZOFRAN) 4 MG tablet Take 1 tablet (4 mg total) by mouth every 6 (six) hours.    oxyCODONE (ROXICODONE) 5 MG immediate release tablet Take 1 tablet (5 mg total) by mouth every 4 (four) hours as needed for Pain.    predniSONE (DELTASONE) 1 MG tablet Take 1 mg by mouth every other day.    tacrolimus (PROGRAF) 1 MG Cap Take 5 capsules (5 mg total) by mouth every 12 (twelve) hours.    triamcinolone acetonide 0.1% (KENALOG) 0.1 % ointment AAA on arms, legs, and neck bid x 1-2 wks then prn flares only (Patient taking differently: Apply to affected area(s) on  arms, legs, and neck twice daily x 1-2 wks then as needed for flares only)      Family History     Problem Relation (Age of Onset)    Hypertension Mother, Father        Social History Main Topics    Smoking status: Never Smoker    Smokeless tobacco: Never Used    Alcohol use No    Drug use: No    Sexual activity: Yes     Partners: Male     Review of Systems   Constitutional: Negative for fever.   HENT: Negative for trouble swallowing.    Eyes: Negative for photophobia.   Respiratory: Negative for shortness of breath.    Cardiovascular: Negative for chest pain.   Gastrointestinal: Negative for abdominal pain.   Genitourinary: Negative for dysuria.   Musculoskeletal: Negative for back pain.   Neurological: Negative for headaches.   Psychiatric/Behavioral: Negative for hallucinations.     Objective:     Vital Signs (Most Recent):  Temp: 99 °F (37.2 °C) (02/24/18 1230)  Pulse: 84 (02/24/18 1317)  Resp: 18 (02/24/18 1317)  BP: (!) 150/100 (02/24/18 1302)  SpO2: 99 % (02/24/18 1317) Vital Signs (24h Range):  Temp:  [98.6 °F (37 °C)-99.3 °F (37.4 °C)] 99 °F (37.2 °C)  Pulse:  [79-94] 84  Resp:  [12-84] 18  SpO2:  [71 %-100 %] 99 %  BP: (121-185)/() 150/100     Weight: 51.4 kg (113 lb 5.1 oz)  Body mass index is 18.86 kg/m².    Physical Exam  Constitutional: She is oriented to person, place, and time. No distress.   HENT:   Head: Normocephalic.   Eyes: Right eye exhibits no discharge. Left eye exhibits no discharge.   Neck: Normal range of motion.   Cardiovascular: Normal rate.    Pulmonary/Chest: Breath sounds normal.   Abdominal: Bowel sounds are normal.   Musculoskeletal: She exhibits no deformity.   Neurological: She is oriented to person, place, and time. She has normal strength.   Psychiatric: Her speech is normal.         NEUROLOGICAL EXAMINATION:      MENTAL STATUS   Oriented to person, place, and time.   Speech: speech is normal   Level of consciousness: alert     CRANIAL NERVES      CN III, IV, VI    Right pupil: Size: 2 mm. Shape: regular.   Left pupil: Size: 2 mm. Shape: regular.   Nystagmus: none   Ophthalmoparesis: none     CN XII   Tongue deviation: none     MOTOR EXAM      Strength   Strength 5/5 throughout.      GAIT AND COORDINATION      Tremor   Resting tremor: absent       Significant Labs:   CBC:   Recent Labs  Lab 02/24/18  0247   WBC 3.45*   HGB 8.2*   HCT 24.8*   PLT 82*     CMP:   Recent Labs  Lab 02/24/18  0247   GLU 97   *   K 3.4*   CL 96   CO2 27   BUN 31*   CREATININE 7.6*   CALCIUM 8.1*   MG 1.8   PROT 6.8   ALBUMIN 2.6*   BILITOT 0.4   ALKPHOS 572*   AST 44*   ALT 36   ANIONGAP 11   EGFRNONAA 7*       Significant Imaging:  MRI brain  1. Right frontal lobe subcortical chronic white changes either from vasculitis or nonspecific areas of gliosis or related to migraine syndromes.  2. MRI of the brain otherwise is unremarkable.      Electronically signed by: MAGGIE SUÁREZ MD  Date: 02/22/18  Time: 13:47       Assessment and Plan:     26 y/o female consulted for seizures    1. Seizures: could be related to hypertension.   MRI of brain shows as a above no specific findings but given concern for vasculitis (see report) CTA of intracranial vessels can be obtained in the future (she does however has no fever or AMS.   -For now will continue levetiracetam 500 mg twice daily.    Active Diagnoses:    Diagnosis Date Noted POA    PRINCIPAL PROBLEM:  Hypertensive emergency [I16.1] 02/23/2018 Yes    Elevated troponin [R74.8] 02/16/2018 Yes    Seizure in response to acute event [R56.9] 02/16/2018 Yes    Immunosuppressed [D89.9] 08/05/2017 Yes     Chronic    Chronic rejection of liver transplant [T86.41] 03/22/2016 Yes     Chronic    Anemia in ESRD (end-stage renal disease) [N18.6, D63.1] 10/12/2015 Yes     Chronic    ESRD on hemodialysis [N18.6, Z99.2] 09/30/2015 Not Applicable     Chronic      Problems Resolved During this Admission:    Diagnosis Date Noted Date Resolved POA    Febrile  illness [R50.9] 08/05/2017 08/06/2017 Yes       VTE Risk Mitigation         Ordered     heparin (porcine) injection 5,000 Units  Every 8 hours     Route:  Subcutaneous        02/23/18 2339     Medium Risk of VTE  Once      02/23/18 2339          Thank you for your consult. I will follow-up with patient. Please contact us if you have any additional questions.    Magdy Harper MD  Neurology  [facility]

## 2018-02-24 NOTE — PLAN OF CARE
"TN to patient's room to discuss Helping the patient manage care at home.    "Discharge planning begins on Admission" pamphlet discussed and placed in "My Health Packet" and placed at bedside.     Preferred Appointment time:  mornings    Preferred pharmacy:   [id number] [place] - [address]  [address]  [place] [address]  Phone: [phone] Fax: [phone]    [facility] [place] [place] [facility] [address] AT [facility] to [facility]  [address]  [facility] [address]  Phone: [phone] Fax: [phone]    [facility] - [place] - [address]  [address]  [place] [address]  Phone: [phone] Fax: [phone]       02/24/18 1714   Discharge Assessment   Assessment Type Discharge Planning Assessment   Confirmed/corrected address and phone number on facesheet? Yes   Assessment information obtained from? Patient   Expected Length of Stay (days) 3   Communicated expected length of stay with patient/caregiver yes   Prior to hospitilization cognitive status: Alert/Oriented   Prior to hospitalization functional status: Independent   Current cognitive status: Alert/Oriented   Current Functional Status: Needs Assistance   Lives With parent(s)   Able to Return to Prior Arrangements yes   Is patient able to care for self after discharge? Yes   Who are your caregiver(s) and their phone number(s)? n/a Mother able to help at home   Patient's perception of discharge disposition home or selfcare   Readmission Within The Last 30 Days previous discharge plan unsuccessful   Does the patient receive outpatient dialysis? Yes   Readmission Questionnaire   At the time of your discharge, did someone talk to you about what your health problems were? Yes   At the time of discharge, did someone talk to you about what to watch out for regarding worsening of your health problem? Yes   At the time of discharge, did someone talk to you about " what to do if you experienced worsening of your health problem? Yes   At the time of discharge, did someone talk to you about which medication to take when you left the hospital and which ones to stop taking? Yes   At the time of discharge, did someone talk to you about when and where to follow up with a doctor after you left the hospital? Yes   What do you believe caused you to be sick enough to be re-admitted? I don't know   How often do you need to have someone help you when you read instructions, pamphlets, or other written material from your doctor or pharmacy? Rarely   Do you have problems taking your medications as prescribed? No   Do you have any problems affording any of  your prescribed medications? No   Do you have problems obtaining/receiving your medications? No   Does the patient have transportation to healthcare appointments? Yes   Living Arrangements house   Does the patient have family/friends to help with healtcare needs after discharge? yes   Does your caregiver provide all the help you need? Yes   Are you currently feeling confused? No   Are you currently having problems thinking? No   Are you currently having memory problems? No   Have you felt down, depressed, or hopeless? 0   Have you felt little interest or pleasure in doing things? 0   In the last 7 days, my sleep quality was: good

## 2018-02-24 NOTE — PLAN OF CARE
Problem: Patient Care Overview  Goal: Plan of Care Review  Outcome: Ongoing (interventions implemented as appropriate)   02/24/18 3888   Coping/Psychosocial   Plan Of Care Reviewed With patient     Patient was admitted last night as a direct admit from [facility] with chief complaint of chest pain and high blood pressure. SBP was on the 200's while in ER. They started Cardene drip for blood pressure control. Patient is awake alert oriented able to move independently on bed. Mom at bedside. Denies any pain or discomfort, No skin breakdown, Has cardene drip on going at 2.5 mg/hr. Patient had a witness  seizure that lasted approx. 20 sec. Dr Sargent was notified and new orders was given and carrried out. Left lower forearm fistula positive for thrill and bruit

## 2018-02-24 NOTE — HPI
Holly Patel is a 27 y.o. female that (in part)  has a past medical history of Anemia in ESRD (end-stage renal disease); Chronic rejection of liver transplant; Encounter for blood transfusion; ESRD on hemodialysis; History of splenomegaly; Immunosuppressed; Iron deficiency anemia secondary to inadequate dietary iron intake; Liver replaced by transplant; Moderate protein-calorie malnutrition; MRSA bacteremia; Prophylactic immunotherapy; Renovascular hypertension; Secondary hyperparathyroidism; and Thrombocytopenia. Presents to [facility] as a transfer patient from the [facility] Emergency Department for evaluation of hypertensive emergency.  She was admitted recently for hypertensive emergency.  She complained of shortness of breath , but denied chest pain, abdominal pain, nausea, or vomiting.  Associated with a Nonproductive cough.  She had dialysis earlier today without issue.  She does report dyspnea worsened with exertion and minimally relieved with rest and supplemental oxygen given in the ED.  Located in the lungs.  Constant duration.  Recurring frequency.  Moderate severity.    In the emergency department routine laboratory studies, EKG, cardiac enzymes were obtained.  There is concern for new development of anterior lateral T-wave inversions.  Repeat EKG even after treatment for hypertensive emergency demonstrated the EKG changes.   Hospital medicine has been asked to admit for further evaluation and treatment as a transfer patient.  Shortly after arrival the patient had a seizure that lasted less than 1 minute.  There is questionable compliance with her home seizure medication regimen.   She was given a Keppra loading dose and has not had any seizures since the initial event.

## 2018-02-24 NOTE — ASSESSMENT & PLAN NOTE
Likely secondary to demand  Recent ischemic workup within normal limits  No further testing planned

## 2018-02-24 NOTE — CARE UPDATE
Interval Progress Note    I agree with my colleague's assessment and plan at time of admission. I personally evaluated Ms Patel today. She states she had chest pain since 4 days ago. Described as epigastric, burning, constant, increasing to 4/10 intensity while and after eating. States chest pain resolved since this morning. No more generalized, tonic clonic seizures since this morning since dose of keppra 1000 mg IV. MRI from 2 days ago showed right frontal lobe subcortical chronic white changes either from vasculitis or nonspecific areas of gliosis or related to migraine syndromes. Denies headache, dizziness, SOB, n/v, diarrhea, pain anywhere. Is non toxic appearing, no focal neuro deficits, AAOx3 and breathing comfortably at room air. BP well controlled. Preg test neg. Trop 0.084 with T wave inversion V5-V6, II, AVL, AVF at 5 AM that resolved in repeat EKG 3 hours after. ASA.  but not sure if statin is contraindicated in transplanted liver although function is preserved. Add a BB. Wean cardene as tolerated. Cardiology, nephrology and neurology consulted for co-management. Patient wants to leave today. I cannot guarantee a discharge today as we do not have a good grasp of her acute medical issues. We discussed her rights to leave against my advise understanding the consequences or stroke, heart attack, aspiration if recurrent seizures, respiratory failure and death. Mother present during conversation. Verbalized understanding and all questions answered to satisfaction.

## 2018-02-24 NOTE — CONSULTS
Renal Consult    Date of Admission: 2018 10:18 PM    HPI:  26 y/o AAF with ESRD on HD Q MWF under the care of Dr. Bass and just discharged from [facility] on  after a Hospitalization for Malignant HTN and seizures, re-admitted yesterday after having a seizure in the ED where she was being seen for c.o. Of chest pain x the last 4 days.    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 ()    Moderate protein-calorie malnutrition 2017    MRSA bacteremia 2017    Prophylactic immunotherapy 2014    Renovascular hypertension 10/2/2015    Secondary hyperparathyroidism 2017    Thrombocytopenia 2016     Past Surgical History:   Procedure Laterality Date     SECTION      2     KIDNEY TRANSPLANT      LIVER BIOPSY      LIVER TRANSPLANT  1992    TUBAL LIGATION       Review of patient's allergies indicates:   Allergen Reactions    Chloral hydrate      Other reaction(s): Hallucinations  Other reaction(s): Hives    Hydrocodone Other (See Comments)     Mental status changes     Current Facility-Administered Medications on File Prior to Encounter   Medication Dose Route Frequency Provider Last Rate Last Dose    [COMPLETED] aspirin tablet 325 mg  325 mg Oral ED 1 Time Frances Lake MD   325 mg at 18 1752    [COMPLETED] nitroGLYCERIN 2% TD oint ointment 1 inch  1 inch Topical (Top) ED 1 Time Frances Lake MD   1 inch at 18 1753    [DISCONTINUED] niCARdipine (CARDENE) 40  mg/200 mL infusion             [DISCONTINUED] niCARdipine 40 mg/200 mL infusion  2.5 mg/hr Intravenous Continuous Frances Lake MD 12.5 mL/hr at 02/23/18 1753 2.5 mg/hr at 02/23/18 1753     Current Outpatient Prescriptions on File Prior to Encounter   Medication Sig Dispense Refill    aspirin 81 MG Chew Take 1 tablet (81 mg total) by mouth once daily.  0    cinacalcet (SENSIPAR) 30 MG Tab Take 1 tablet (30 mg total) by mouth daily with breakfast. 30 tablet 11    cloNIDine 0.2 mg/24 hr td ptwk (CATAPRES) 0.2 mg/24 hr Place 1 patch onto the skin every 7 days. Change every Friday 4 patch 11    famotidine (PEPCID) 20 MG tablet Take 1 tablet (20 mg total) by mouth 2 (two) times daily. 20 tablet 0    food supplemt, lactose-reduced (ENSURE ACTIVE HIGH PROTEIN) Liqd Take 236 mLs by mouth 2 (two) times daily. 60 Can 6    furosemide (LASIX) 20 MG tablet Take 5 tablets (100 mg total) by mouth 2 (two) times daily. 300 tablet 11    hydrALAZINE (APRESOLINE) 25 MG tablet Take 1 tablet (25 mg total) by mouth every 12 (twelve) hours. 60 tablet 1    labetalol (NORMODYNE) 200 MG tablet Take 4 tablets (800 mg total) by mouth every 8 (eight) hours. 360 tablet 11    NIFEdipine (PROCARDIA-XL) 30 MG (OSM) 24 hr tablet Take 2 tablets (60 mg total) by mouth once daily. 60 tablet 11    ondansetron (ZOFRAN) 4 MG tablet Take 1 tablet (4 mg total) by mouth every 6 (six) hours. 12 tablet 0    oxyCODONE (ROXICODONE) 5 MG immediate release tablet Take 1 tablet (5 mg total) by mouth every 4 (four) hours as needed for Pain. 12 tablet 0    predniSONE (DELTASONE) 1 MG tablet Take 1 mg by mouth every other day.      tacrolimus (PROGRAF) 1 MG Cap Take 5 capsules (5 mg total) by mouth every 12 (twelve) hours. 300 capsule 11    triamcinolone acetonide 0.1% (KENALOG) 0.1 % ointment AAA on arms, legs, and neck bid x 1-2 wks then prn flares only (Patient taking differently: Apply to affected area(s) on arms, legs, and neck twice daily x 1-2  wks then as needed for flares only) 80 g 3     Social History     Social History    Marital status: Legally      Spouse name: N/A    Number of children: N/A    Years of education: N/A     Occupational History    Not on file.     Social History Main Topics    Smoking status: Never Smoker    Smokeless tobacco: Never Used    Alcohol use No    Drug use: No    Sexual activity: Yes     Partners: Male     Other Topics Concern    Are You Pregnant Or Think You May Be? No    Breast-Feeding No     Social History Narrative    Lives 2 kids, 7 and 8, and nephew. Her mom helps with kids.     Family History   Problem Relation Age of Onset    Hypertension Mother     Hypertension Father     Melanoma Neg Hx        Review of Systems   Constitutional: Negative for chills and fever.   HENT: Negative for congestion and sore throat.    Respiratory: Positive for chest tightness and shortness of breath.    Cardiovascular: Positive for chest pain. Negative for palpitations.   Gastrointestinal: Negative for abdominal pain, nausea and vomiting.   Genitourinary: Negative for flank pain and hematuria.   Musculoskeletal: Negative for back pain and neck pain.   Neurological: Negative for dizziness, weakness and light-headedness.   All other systems reviewed and are negative.    Physical Exam:    Vitals:    02/24/18 1247 02/24/18 1302 02/24/18 1315 02/24/18 1317   BP: (!) 147/87 (!) 150/100     Pulse:   85 84   Resp:   12 18   Temp:       TempSrc:       SpO2:   100% 99%   Weight:       Height:         I/O last 3 completed shifts:  In: 137.5 [I.V.:37.5; IV Piggyback:100]  Out: -   I/O this shift:  In: 662.5 [P.O.:600; I.V.:62.5]  Out: -       Constitutional: She appears well-developed and well-nourished. No distress.   HENT:   Head: Normocephalic  Neck: supple.   Cardiovascular: Normal rate, regular rhythm and normal heart sounds.   Pulmonary: Breath sounds normal.  Abdominal: normal.   Musculoskeletal: no edema   + thrill to  AV fistula on left forearm   Neurological: She is alert and oriented to person, place, and time.     Laboratories:      Recent Labs  Lab 02/24/18  0247   WBC 3.45*   RBC 3.27*   HGB 8.2*   HCT 24.8*   PLT 82*   MCV 76*   MCH 25.1*   MCHC 33.1       Recent Labs  Lab 02/24/18  0247   CALCIUM 8.1*   PROT 6.8   *   K 3.4*   CO2 27   CL 96   BUN 31*   CREATININE 7.6*   ALKPHOS 572*   ALT 36   AST 44*   BILITOT 0.4     Phosphorus         6.1        5.3  Phosphorus     Magnesium          2.2 2.1 1.8     1.8  Magnesium     Diagnostic Tests: n/a    Assessment:    28 y/o female with Hx. Malignant HTN, ESRD on HD and Hx. Liver transplant with chronic rejection  admitted with:    - Breaktrough seizures? Pte. Claims taking her anti seizure meds. At home  - Anemia of CKD  - Mild hypokalemia      Plan:    - Resume dialysis MWF  - Epogen w/ HD  - BP control  - Renal diet  - Neurology following      Will f/u on Dialysis days only

## 2018-02-24 NOTE — H&P
[facility] Medicine  History & Physical    Patient Name: Holly Patel  MRN: [id number]  Admission Date: 02/24/2018  Attending Physician: Philip Sargent MD, MPH      PCP:     Stan Sosa MD    CC:     Transfer for evaluation of hypertensive emergency    HISTORY OF PRESENT ILLNESS:     Holly Patel is a 27 y.o. female that (in part)  has a past medical history of Anemia in ESRD (end-stage renal disease); Chronic rejection of liver transplant; Encounter for blood transfusion; ESRD on hemodialysis; History of splenomegaly; Immunosuppressed; Iron deficiency anemia secondary to inadequate dietary iron intake; Liver replaced by transplant; Moderate protein-calorie malnutrition; MRSA bacteremia; Prophylactic immunotherapy; Renovascular hypertension; Secondary hyperparathyroidism; and Thrombocytopenia. Presents to [facility] as a transfer patient from the [facility] Emergency Department for evaluation of hypertensive emergency.  She was admitted recently for hypertensive emergency.  She complained of shortness of breath , but denied chest pain, abdominal pain, nausea, or vomiting.  Associated with a Nonproductive cough.  She had dialysis earlier today without issue.  She does report dyspnea worsened with exertion and minimally relieved with rest and supplemental oxygen given in the ED.  Located in the lungs.  Constant duration.  Recurring frequency.  Moderate severity.    In the emergency department routine laboratory studies, EKG, cardiac enzymes were obtained.  There is concern for new development of anterior lateral T-wave inversions.  Repeat EKG even after treatment for hypertensive emergency demonstrated the EKG changes.   Hospital medicine has been asked to admit for further evaluation and treatment as a transfer patient.  Shortly after arrival the patient had a seizure that lasted less than 1 minute.  There is questionable compliance  with her home seizure medication regimen.   She was given a Keppra loading dose and has not had any seizures since the initial event.      REVIEW OF SYSTEMS:     -- Constitutional: No fever or chills.  -- Eyes: No visual changes, diplopia, pain, tearing, blind spots, or discharge.   -- Ears, nose, mouth, throat, and face: No congestion, sore throat, epistaxis, d/c, bleeding gums, neck stiffness masses, or dental issues.  -- Respiratory: As above in history of present illness.  -- Cardiovascular: No chest pain.  No syncope.   -- Gastrointestinal: No vomiting, abdominal pain, hematemesis, melena, dyspepsia, or change in bowel habits.  -- Genitourinary: ESRD patients on dialysis.  No vaginal discharge.  -- Integument/breast: No rash, pruritis, pigmentation changes, dryness, or changes in hair  -- Hematologic/lymphatic: No easy bruising or lymphadenopathy.   -- Musculoskeletal:  chronic arthralgias.  No acute arthralgias, acute myalgias, joint swelling, acute limitations of ROM, or acute muscular weakness.  -- Neurological: No seizures, headaches, incoordination, paraesthesias, ataxia, vertigo, or tremors.  -- Behavioral/Psych: No auditory or visual hallucinations, depression, or suicidal/homicidal ideations.  -- Endocrine: No heat or cold intolerance, polydipsia, or unintentional weight gain / loss.  -- Allergy/Immunologic: Chronic immunosuppression.   No recurrent infections or adverse reaction to food, insects, or difficulty breathing.      PAST MEDICAL / SURGICAL HISTORY:     Past Medical History:   Diagnosis Date    Anemia in ESRD (end-stage renal disease) 10/12/2015    Chronic rejection of liver transplant 3/22/2016    Encounter for blood transfusion     ESRD on hemodialysis 9/30/2015    History of splenomegaly 4/12/2016    Immunosuppressed 8/5/2017    Iron deficiency anemia secondary to inadequate dietary iron intake 8/16/2017    She receives [facility] iron periodically at the [facility].    Liver replaced  by transplant 9/10/2012    hemangioendothelioma s/p LTx ()    Moderate protein-calorie malnutrition 2017    MRSA bacteremia 2017    Prophylactic immunotherapy 2014    Renovascular hypertension 10/2/2015    Secondary hyperparathyroidism 2017    Thrombocytopenia 2016     Past Surgical History:   Procedure Laterality Date     SECTION      2     KIDNEY TRANSPLANT      LIVER BIOPSY      LIVER TRANSPLANT  1992    TUBAL LIGATION           FAMILY HISTORY:     Family History   Problem Relation Age of Onset    Hypertension Mother     Hypertension Father     Melanoma Neg Hx          SOCIAL HISTORY:     Social History   Substance Use Topics    Smoking status: Never Smoker    Smokeless tobacco: Never Used    Alcohol use No     Social History     Social History    Marital status: Legally      Spouse name: N/A    Number of children: N/A    Years of education: N/A     Social History Main Topics    Smoking status: Never Smoker    Smokeless tobacco: Never Used    Alcohol use No    Drug use: No    Sexual activity: Yes     Partners: Male     Other Topics Concern    Are You Pregnant Or Think You May Be? No    Breast-Feeding No     Social History Narrative    Lives 2 kids, 7 and 8, and nephew. Her mom helps with kids.         ALLERGIES:       Review of patient's allergies indicates:   Allergen Reactions    Chloral hydrate      Other reaction(s): Hallucinations  Other reaction(s): Hives    Hydrocodone Other (See Comments)     Mental status changes         HEALTH SCREENING:     Influenza vaccine not up-to-date for this season.  Prevnar 13 pneumonia vaccine = evidence of previous vaccination found in the medical record      HOME MEDICATIONS:     Prior to Admission medications    Medication Sig Start Date End Date Taking? Authorizing Provider   aspirin 81 MG Chew Take 1 tablet (81 mg total) by mouth once daily. 18  Farheen Tellez MD   [facility]  (SENSIPAR) 30 MG Tab Take 1 tablet (30 mg total) by mouth daily with breakfast. 1/26/18 1/26/19  Jacky Escalera MD   cloNIDine 0.2 mg/24 hr td ptwk (CATAPRES) 0.2 mg/24 hr Place 1 patch onto the skin every 7 days. Change every Friday 1/22/18 1/22/19  Viktor Bass MD   famotidine (PEPCID) 20 MG tablet Take 1 tablet (20 mg total) by mouth 2 (two) times daily. 1/7/18 1/7/19  Rosales Liu MD   food supplemt, lactose-reduced (ENSURE ACTIVE HIGH PROTEIN) Liqd Take 236 mLs by mouth 2 (two) times daily. 8/16/17   Galindo Amor MD   furosemide (LASIX) 20 MG tablet Take 5 tablets (100 mg total) by mouth 2 (two) times daily. 1/26/18 1/26/19  Jacky Escalera MD   hydrALAZINE (APRESOLINE) 25 MG tablet Take 1 tablet (25 mg total) by mouth every 12 (twelve) hours. 1/11/18 1/11/19  Jo Mccracken, SAM   labetalol (NORMODYNE) 200 MG tablet Take 4 tablets (800 mg total) by mouth every 8 (eight) hours. 1/26/18 1/26/19  Jacky Escalera MD   NIFEdipine (PROCARDIA-XL) 30 MG (OSM) 24 hr tablet Take 2 tablets (60 mg total) by mouth once daily. 1/26/18 1/26/19  Jacky Escalera MD   ondansetron (ZOFRAN) 4 MG tablet Take 1 tablet (4 mg total) by mouth every 6 (six) hours. 1/7/18   Rosales Liu MD   oxyCODONE (ROXICODONE) 5 MG immediate release tablet Take 1 tablet (5 mg total) by mouth every 4 (four) hours as needed for Pain. 1/18/18   Sohan Murphy MD   predniSONE (DELTASONE) 1 MG tablet Take 1 mg by mouth every other day.    Historical Provider, MD   tacrolimus (PROGRAF) 1 MG Cap Take 5 capsules (5 mg total) by mouth every 12 (twelve) hours. 1/26/18 1/26/19  Jacky Escalera MD   triamcinolone acetonide 0.1% (KENALOG) 0.1 % ointment AAA on arms, legs, and neck bid x 1-2 wks then prn flares only  Patient taking differently: Apply to affected area(s) on arms, legs, and neck twice daily x 1-2 wks then as needed for flares only 12/31/14   Diana Grider MD          [facility]  MEDICATIONS:     Scheduled Meds:    aspirin  81 mg Oral Daily    cinacalcet  30 mg Oral Daily with breakfast    cloNIDine 0.2 mg/24 hr td ptwk  1 patch Transdermal Q7 Days    famotidine  20 mg Oral BID    heparin (porcine)  5,000 Units Subcutaneous Q8H    hydrALAZINE  25 mg Oral Q12H    lorazepam        predniSONE  1 mg Oral Every other day    senna-docusate 8.6-50 mg  1 tablet Oral Daily    sodium chloride 0.9%  3 mL Intravenous Q8H    tacrolimus  5 mg Oral BID     Continuous Infusions:    niCARdipine 2.5 mg/hr (02/24/18 0100)     PRN Meds: acetaminophen, bisacodyl, lorazepam, mineral oil, ondansetron, oxyCODONE, polyethylene glycol, promethazine, ramelteon      PHYSICAL EXAM:     Wt Readings from Last 1 Encounters:   02/23/18 2234 51.4 kg (113 lb 5.1 oz)     Body mass index is 18.86 kg/m².  Vitals:    02/24/18 0100 02/24/18 0115 02/24/18 0130 02/24/18 0145   BP: (!) 164/99 (!) 158/94 121/70 (!) 159/99   Pulse: 89 90 88 92   Resp: 13 (!) 21 (!) 23 (!) 26   Temp:       TempSrc:       SpO2: 100% 100% 100% 100%   Weight:       Height:              -- General appearance: well developed. appears stated age   -- Head: normocephalic, atraumatic   -- Eyes: conjunctivae clear. Extraocular muscles intact  -- Nose: Nares normal. Septum midline.   -- Mouth/Throat: lips, mucosa, and tongue normal. no throat erythema.   -- Neck: supple, symmetrical, trachea midline, no JVD and thyroid not grossly enlarged, appears symmetric  -- Lungs: clear to auscultation bilaterally. normal respiratory effort. No use of accessory muscles.   -- Chest wall: no tenderness. equal bilateral chest rise   -- Heart: rapid rate and regular rhythm. S1, S2 normal.  no click, rub or gallop   -- Abdomen:Well-healed abdominal surgical scars.   soft, non-tender, non-distended, non-tympanic; bowel sounds normal; no masses  -- Extremities: no cyanosis, clubbing or edema.   -- Pulses: 2+ and symmetric   -- Skin: Dialysis  access present in  left  upper extremity  With thrill.  color normal, texture normal, turgor normal. No rashes or lesions.   -- Neurologic: Normal strength and tone. No focal numbness or weakness. CNII-XII intact. Tesha coma scale: eyes open spontaneously-4, oriented & converses-5, obeys commands-6.      LABORATORY STUDIES:     Recent Results (from the past 36 hour(s))   POCT CMP    Collection Time: 02/23/18  5:34 PM   Result Value Ref Range    Albumin, POC 2.8 (L) 3.3 - 5.5 g/dL    Alkaline Phosphatase,  (H) 42 - 141 U/L    ALT (SGPT), POC 38 10 - 47 U/L    AST (SGOT), POC 56 (H) 11 - 38 U/L    POC BUN 23 (H) 7 - 22 mg/dL    Calcium, POC 8.0 8.0 - 10.3 mg/dL    POC Chloride 92 (L) 98 - 108 mmol/L    POC Creatinine 6.1 (H) 0.6 - 1.2 mg/dL    POC Glucose 94 73 - 118 mg/dL    POC Potassium 3.1 (L) 3.6 - 5.1 mmol/L    POC Sodium 136 128 - 145 mmol/L    Bilirubin 0.4 0.2 - 1.6 mg/dL    POC TCO2 27 18 - 33 mmol/L    Protein 7.4 6.4 - 8.1 g/dL   ISTAT PROCEDURE    Collection Time: 02/23/18  5:37 PM   Result Value Ref Range    POC PTWBT 14.4 (H) 9.7 - 14.3 sec    POC PTINR 1.2 0.9 - 1.2    Sample UNK    Troponin ISTAT    Collection Time: 02/23/18  5:37 PM   Result Value Ref Range    POC Cardiac Troponin I 0.08 <0.09 ng/mL    Sample MICHELL    POCT urine pregnancy    Collection Time: 02/23/18  5:41 PM   Result Value Ref Range    POC Preg Test, Ur Negative Negative     Acceptable Yes        Lab Results   Component Value Date    INR 1.1 02/16/2018    INR 1.1 01/18/2018    INR 1.1 01/04/2018     Lab Results   Component Value Date    HGBA1C 4.5 01/24/2018     No results for input(s): POCTGLUCOSE in the last 72 hours.        MICROBIOLOGY DATA:     Urine Culture, Routine   Date Value Ref Range Status   01/29/2018   Final    ENTEROCOCCUS FAECALIS  >100,000 cfu/ml  No other significant isolate     01/17/2018 No significant growth  Final   01/11/2018 No significant growth  Final   12/17/2017   Final    STREPTOCOCCUS AGALACTIAE (GROUP  B)  > 100,000 cfu/ml  Beta-hemolytic streptococci are routinely susceptible to   penicillins,cephalosporins and carbapenems.     04/13/2016 No significant growth  Final       Microbiology x 7d:   Microbiology Results (last 7 days)     ** No results found for the last 168 hours. **            IMAGING:     X-Ray Chest PA And Lateral  Coarse interstitial attenuation, improved since the previous exam, may reflect improving edema.  Vascular crowding projected over the right lower lung zone may reflect atelectasis, developing consolidation however is a consideration and correlation is recommended.         CONSULTS:     IP CONSULT TO NEPHROLOGY  IP CONSULT TO CARDIOLOGY       ASSESSMENT & PLAN:     Primary Diagnosis:  Hypertensive emergency    Active Hospital Problems    Diagnosis  POA    *Hypertensive emergency [I16.1]  Yes     Priority: 1 - High    Elevated troponin [R74.8]  Yes     Priority: 2     Seizure in response to acute event [R56.9]  Yes     Priority: 3     Immunosuppressed [D89.9]  Yes     Chronic    Chronic rejection of liver transplant [T86.41]  Yes     Chronic    Anemia in ESRD (end-stage renal disease) [N18.6, D63.1]  Yes     Chronic    ESRD on hemodialysis [N18.6, Z99.2]  Not Applicable     Chronic      Resolved Hospital Problems    Diagnosis Date Resolved POA    Febrile illness [R50.9] 08/06/2017 Yes       Hypertensive emergency with history of difficult to control hypertension and elevated troponin level  · Presents with a systolic blood pressure of approximately 200 mmHg.    · In order to decrease the risk of acute stroke, we will initiate blood pressure medications with a goal of a decrease of 30% in the next 24 hours.  · Thereafter, the goal while inpatient is a systolic blood pressure less than 160mmHg  · BP was not in acceptable range prior to transfer and after the initiation of nitrates and an oral regimen   · Escalated to Cardene drip for tighter blood pressure control  · Continue current  regimen    Seizure in response to acute event   acute seizure in response to uncontrolled hypertension and likely noncompliance with medication home regimen , including Keppra  Keppra level ordered as an add-on lab then Keppra loading dose given IV  Seizure precautions  Seizure restrictions are but not limited to: no driving for six months after last seizure; avoid swimming, high altitude activities, operating heavy machinery, bathing unattended, or engaging in activities in where a seizure will cause harm to self or others.  F/u Neuro as an oupt.    Elevated troponin and dynamic EKG changes   multifactorial etiology is likely to play including,: ESRD and uncontrolled hypertension resulting in cardiac strain.  ACS has not been ruled out completely.  Will complete continue to trend troponins.  Of note, the patient did have dynamic EKG changes in her anterior lateral leads.  Monitor closely on telemetry.  Aspirin given.  Cardiology consult for further evaluation and recommendations.  Anticoagulation given with heparin, although with caution given her chronic thrombocytopenia.  Monitor platelets closely.    Immunocompromised state with history of liver transplant  · will resume prednisone and Prograf  · Obtain Prograf level      End-stage renal disease on dialysis  · Presents with evidence of volume overload  · No acute issues with significant electrolyte abnormality, or acid-base abnormality at this time  · Will need urgent dialysis while inpatient  · Nephrology consulted        VTE Risk Mitigation         Ordered     heparin (porcine) injection 5,000 Units  Every 8 hours     Route:  Subcutaneous        02/23/18 2339     Medium Risk of VTE  Once      02/23/18 2339            Adult PRN medications available   DVT prophylaxis given       DISPOSITION:     Will admit to the Hospital Medicine service for further evaluation and treatment.    Chart reviewed and updated where applicable.    High Risk Conditions:  Patient has  a condition that poses threat to life and bodily function: Hypertensive emergency      ===============================================================    Philip Sargent MD, MPH  Department of Hospital Medicine   [facility]  [phone] pg  (7pm - 6am)        MDM   Reviewed: previous chart and vitals  Reviewed previous: labs, x-ray,   Interpretation: labs, x-ray,     Total Critical Care time: 15 minutes. This excludes time spent performing separately reportable procedures and services.  Counseling/Conference Time: 15 minutes        This note is dictated using Dragon Medical 360 voice recognition software.  There are word recognition mistakes that are occasionally missed on review.

## 2018-02-25 LAB
ALBUMIN SERPL BCP-MCNC: 2.7 G/DL
ALP SERPL-CCNC: 572 U/L
ALT SERPL W/O P-5'-P-CCNC: 37 U/L
ANION GAP SERPL CALC-SCNC: 11 MMOL/L
ANISOCYTOSIS BLD QL SMEAR: SLIGHT
AST SERPL-CCNC: 40 U/L
BASOPHILS # BLD AUTO: 0 K/UL
BASOPHILS NFR BLD: 0 %
BILIRUB SERPL-MCNC: 0.5 MG/DL
BUN SERPL-MCNC: 44 MG/DL
CALCIUM SERPL-MCNC: 8.2 MG/DL
CHLORIDE SERPL-SCNC: 99 MMOL/L
CO2 SERPL-SCNC: 27 MMOL/L
CREAT SERPL-MCNC: 9.9 MG/DL
DIFFERENTIAL METHOD: ABNORMAL
EOSINOPHIL # BLD AUTO: 0.5 K/UL
EOSINOPHIL NFR BLD: 16.3 %
ERYTHROCYTE [DISTWIDTH] IN BLOOD BY AUTOMATED COUNT: 16.6 %
EST. GFR  (AFRICAN AMERICAN): 6 ML/MIN/1.73 M^2
EST. GFR  (NON AFRICAN AMERICAN): 5 ML/MIN/1.73 M^2
GLUCOSE SERPL-MCNC: 95 MG/DL
HCT VFR BLD AUTO: 23.9 %
HGB BLD-MCNC: 8.2 G/DL
HYPOCHROMIA BLD QL SMEAR: ABNORMAL
LYMPHOCYTES # BLD AUTO: 0.6 K/UL
LYMPHOCYTES NFR BLD: 20.3 %
MAGNESIUM SERPL-MCNC: 2.2 MG/DL
MCH RBC QN AUTO: 25.6 PG
MCHC RBC AUTO-ENTMCNC: 34.3 G/DL
MCV RBC AUTO: 75 FL
MONOCYTES # BLD AUTO: 0.2 K/UL
MONOCYTES NFR BLD: 6.2 %
NEUTROPHILS # BLD AUTO: 1.8 K/UL
NEUTROPHILS NFR BLD: 57.5 %
OVALOCYTES BLD QL SMEAR: ABNORMAL
PHOSPHATE SERPL-MCNC: 6.5 MG/DL
PLATELET # BLD AUTO: 79 K/UL
PMV BLD AUTO: ABNORMAL FL
POTASSIUM SERPL-SCNC: 3.8 MMOL/L
PROT SERPL-MCNC: 6.7 G/DL
RBC # BLD AUTO: 3.2 M/UL
SODIUM SERPL-SCNC: 137 MMOL/L
WBC # BLD AUTO: 3.06 K/UL

## 2018-02-25 PROCEDURE — 25000003 PHARM REV CODE 250: Performed by: HOSPITALIST

## 2018-02-25 PROCEDURE — A4216 STERILE WATER/SALINE, 10 ML: HCPCS | Performed by: HOSPITALIST

## 2018-02-25 PROCEDURE — 80053 COMPREHEN METABOLIC PANEL: CPT

## 2018-02-25 PROCEDURE — 12000002 HC ACUTE/MED SURGE SEMI-PRIVATE ROOM

## 2018-02-25 PROCEDURE — 25000003 PHARM REV CODE 250: Performed by: INTERNAL MEDICINE

## 2018-02-25 PROCEDURE — 99232 SBSQ HOSP IP/OBS MODERATE 35: CPT | Mod: ,,, | Performed by: INTERNAL MEDICINE

## 2018-02-25 PROCEDURE — 85025 COMPLETE CBC W/AUTO DIFF WBC: CPT

## 2018-02-25 PROCEDURE — 63600175 PHARM REV CODE 636 W HCPCS: Performed by: HOSPITALIST

## 2018-02-25 PROCEDURE — 84100 ASSAY OF PHOSPHORUS: CPT

## 2018-02-25 PROCEDURE — 63600175 PHARM REV CODE 636 W HCPCS: Performed by: INTERNAL MEDICINE

## 2018-02-25 PROCEDURE — 36415 COLL VENOUS BLD VENIPUNCTURE: CPT

## 2018-02-25 PROCEDURE — 83735 ASSAY OF MAGNESIUM: CPT

## 2018-02-25 RX ORDER — HYDRALAZINE HYDROCHLORIDE 25 MG/1
25 TABLET, FILM COATED ORAL EVERY 8 HOURS
Status: DISCONTINUED | OUTPATIENT
Start: 2018-02-25 | End: 2018-02-25

## 2018-02-25 RX ORDER — LABETALOL 100 MG/1
800 TABLET, FILM COATED ORAL EVERY 8 HOURS
Status: DISCONTINUED | OUTPATIENT
Start: 2018-02-25 | End: 2018-02-25 | Stop reason: SDUPTHER

## 2018-02-25 RX ORDER — NIFEDIPINE 30 MG/1
60 TABLET, EXTENDED RELEASE ORAL DAILY
Status: DISCONTINUED | OUTPATIENT
Start: 2018-02-26 | End: 2018-02-25 | Stop reason: SDUPTHER

## 2018-02-25 RX ORDER — HYDRALAZINE HYDROCHLORIDE 20 MG/ML
10 INJECTION INTRAMUSCULAR; INTRAVENOUS EVERY 8 HOURS PRN
Status: DISCONTINUED | OUTPATIENT
Start: 2018-02-25 | End: 2018-03-01 | Stop reason: HOSPADM

## 2018-02-25 RX ORDER — CARVEDILOL 6.25 MG/1
25 TABLET ORAL 2 TIMES DAILY
Status: DISCONTINUED | OUTPATIENT
Start: 2018-02-25 | End: 2018-02-25

## 2018-02-25 RX ORDER — LABETALOL 100 MG/1
300 TABLET, FILM COATED ORAL EVERY 12 HOURS
Status: DISCONTINUED | OUTPATIENT
Start: 2018-02-25 | End: 2018-02-26

## 2018-02-25 RX ORDER — NIFEDIPINE 30 MG/1
60 TABLET, EXTENDED RELEASE ORAL DAILY
Status: DISCONTINUED | OUTPATIENT
Start: 2018-02-26 | End: 2018-03-01 | Stop reason: HOSPADM

## 2018-02-25 RX ADMIN — LEVETIRACETAM 500 MG: 500 TABLET, FILM COATED ORAL at 08:02

## 2018-02-25 RX ADMIN — SODIUM CHLORIDE, PRESERVATIVE FREE 3 ML: 5 INJECTION INTRAVENOUS at 08:02

## 2018-02-25 RX ADMIN — TACROLIMUS 5 MG: 1 CAPSULE ORAL at 08:02

## 2018-02-25 RX ADMIN — HYDRALAZINE HYDROCHLORIDE 10 MG: 20 INJECTION INTRAMUSCULAR; INTRAVENOUS at 05:02

## 2018-02-25 RX ADMIN — LABETALOL HYDROCHLORIDE 300 MG: 100 TABLET, FILM COATED ORAL at 08:02

## 2018-02-25 RX ADMIN — ACETAMINOPHEN 650 MG: 325 TABLET ORAL at 07:02

## 2018-02-25 RX ADMIN — HEPARIN SODIUM 5000 UNITS: 5000 INJECTION, SOLUTION INTRAVENOUS; SUBCUTANEOUS at 08:02

## 2018-02-25 RX ADMIN — HEPARIN SODIUM 5000 UNITS: 5000 INJECTION, SOLUTION INTRAVENOUS; SUBCUTANEOUS at 02:02

## 2018-02-25 RX ADMIN — TACROLIMUS 5 MG: 1 CAPSULE ORAL at 05:02

## 2018-02-25 RX ADMIN — DOCUSATE SODIUM AND SENNOSIDES 1 TABLET: 8.6; 5 TABLET, FILM COATED ORAL at 08:02

## 2018-02-25 RX ADMIN — CINACALCET HYDROCHLORIDE 30 MG: 30 TABLET, COATED ORAL at 08:02

## 2018-02-25 RX ADMIN — RAMELTEON 8 MG: 8 TABLET, FILM COATED ORAL at 12:02

## 2018-02-25 RX ADMIN — PANTOPRAZOLE SODIUM 40 MG: 40 TABLET, DELAYED RELEASE ORAL at 08:02

## 2018-02-25 RX ADMIN — SODIUM CHLORIDE, PRESERVATIVE FREE 3 ML: 5 INJECTION INTRAVENOUS at 05:02

## 2018-02-25 RX ADMIN — HYDRALAZINE HYDROCHLORIDE 5 MG: 20 INJECTION INTRAMUSCULAR; INTRAVENOUS at 03:02

## 2018-02-25 RX ADMIN — ASPIRIN 81 MG 81 MG: 81 TABLET ORAL at 08:02

## 2018-02-25 RX ADMIN — SODIUM CHLORIDE, PRESERVATIVE FREE 3 ML: 5 INJECTION INTRAVENOUS at 02:02

## 2018-02-25 RX ADMIN — CARVEDILOL 25 MG: 6.25 TABLET, FILM COATED ORAL at 08:02

## 2018-02-25 RX ADMIN — HEPARIN SODIUM 5000 UNITS: 5000 INJECTION, SOLUTION INTRAVENOUS; SUBCUTANEOUS at 05:02

## 2018-02-25 NOTE — ASSESSMENT & PLAN NOTE
Although patient denies non compliance, there are numerous reports reporting medication non compliance. Weaned off nicardipine infusion and now on PO therapy for goal < 150/90 mmHg. Will obtain US to r/o CHIDI

## 2018-02-25 NOTE — PLAN OF CARE
Problem: Patient Care Overview  Goal: Plan of Care Review  Outcome: Ongoing (interventions implemented as appropriate)  Transferred to med-tele.  AA&O.  QIANA.  B/P meds adjusted per MD.  Passed 1 BM.  No seizure activity.  For dialysis in am post CT head/neck.  Fall & pressure ulcer prevention interventions on-going.

## 2018-02-25 NOTE — NURSING TRANSFER
Nursing Transfer Note      2/25/2018     Transfer To: [id number]    Transfer via wheelchair    Transfer with cardiac monitoring    Transported by RN    Medicines sent: yes    Chart send with patient: Yes    Notified: Mother at bedside.    Upon arrival to floor: Cardiac monitor applied, patient oriented to room, call bell in reach and bed in lowest position.  Reported off to PERRY Peres RN.

## 2018-02-25 NOTE — ASSESSMENT & PLAN NOTE
Likely 2/2 hypertensive crisis. No evidence of stroke per exam. MRI wo contrast obtained prior to admission reports chronic findings on right frontal lobe concerning for vasculitis vs gliosis. No fever, AMS or gross neuro deficits. No purpura on exam. Contrast with MRI contraindicated as she is ESRD. Will obtain CTA of brain tomorrow to further evaluate vessels. HD after CTA. Keppra 500 PO BID for now. Neurology on board for co-management

## 2018-02-25 NOTE — PROGRESS NOTES
[facility] Hospital Medicine  Progress Note    Patient Name: Holly Patel  MRN: [id number]  Patient Class: IP- Inpatient   Admission Date: 2/23/2018  Length of Stay: 2 days  Attending Physician: Marilu Pinedo MD  Primary Care Provider: Stan Sosa MD        Subjective:     Principal Problem:Hypertensive emergency    HPI:  Holly Patel is a 27 y.o. female that (in part)  has a past medical history of Anemia in ESRD (end-stage renal disease); Chronic rejection of liver transplant; Encounter for blood transfusion; ESRD on hemodialysis; History of splenomegaly; Immunosuppressed; Iron deficiency anemia secondary to inadequate dietary iron intake; Liver replaced by transplant; Moderate protein-calorie malnutrition; MRSA bacteremia; Prophylactic immunotherapy; Renovascular hypertension; Secondary hyperparathyroidism; and Thrombocytopenia. Presents to [facility] as a transfer patient from the [facility] Emergency Department for evaluation of hypertensive emergency.  She was admitted recently for hypertensive emergency.  She complained of shortness of breath , but denied chest pain, abdominal pain, nausea, or vomiting.  Associated with a Nonproductive cough.  She had dialysis earlier today without issue.  She does report dyspnea worsened with exertion and minimally relieved with rest and supplemental oxygen given in the ED.  Located in the lungs.  Constant duration.  Recurring frequency.  Moderate severity.    In the emergency department routine laboratory studies, EKG, cardiac enzymes were obtained.  There is concern for new development of anterior lateral T-wave inversions.  Repeat EKG even after treatment for hypertensive emergency demonstrated the EKG changes.   Hospital medicine has been asked to admit for further evaluation and treatment as a transfer patient.  Shortly after arrival the patient had a seizure that lasted less than 1 minute.   There is questionable compliance with her home seizure medication regimen.   She was given a Keppra loading dose and has not had any seizures since the initial event.    Hospital Course:  Ms Patel presented with hypertensive emergency as evidenced by elevated troponin and tonic clonic seizure event. Admitted to ICU for nicardipine infusion. Trop mildly elevated to 0.08 without STEMI on EKG. Had a chest pain not typical for angina. Trop likely demand. Weaned off nicardipine infusion on 2/24 and transitioned to PO meds. Seizure controlled after one dose of keppra 1000 mg IV. Had this same issue on a recent admission for the same issues. Patient is reported to be non compliant. Neurology consulted on last admission. Planned for MRI as CT showed no acute abnormalities. MRI wo contrast showed chronic findings in right frontal lobe concerning for vasculitis vs nonspecific areas of gliosis or related to migraine syndromes. Has no fever, headache or AMS. Contrast for MRI cannot be given as she is ESRD, therefore CTA of brain ordered to further evaluate vessels. Stepped down to telemetry floor on 2/25    Interval History: BP still high but better. Feels well. Has no new complaints. No seizures. No chest pain    Review of Systems   Constitutional: Negative.    Respiratory: Negative.    Cardiovascular: Negative.    Gastrointestinal: Negative.    Musculoskeletal: Negative.    Neurological: Negative.    Psychiatric/Behavioral: Negative.      Objective:     Vital Signs (Most Recent):  Temp: 97.9 °F (36.6 °C) (02/25/18 0754)  Pulse: 82 (02/25/18 1030)  Resp: 18 (02/25/18 1030)  BP: (!) 160/100 (02/25/18 1002)  SpO2: 100 % (02/25/18 1030) Vital Signs (24h Range):  Temp:  [97.9 °F (36.6 °C)-99 °F (37.2 °C)] 97.9 °F (36.6 °C)  Pulse:  [] 82  Resp:  [12-84] 22  SpO2:  [82 %-100 %] 100 %  BP: (130-201)/() 160/100     Weight: 51.4 kg (113 lb 5.1 oz)  Body mass index is 18.86 kg/m².    Intake/Output Summary (Last 24 hours)  at 02/25/18 1046  Last data filed at 02/25/18 0833   Gross per 24 hour   Intake              625 ml   Output                0 ml   Net              625 ml      Physical Exam   Constitutional: She is oriented to person, place, and time. She appears well-developed. No distress.   Eyes: EOM are normal. Pupils are equal, round, and reactive to light.   Cardiovascular: Normal rate, regular rhythm and intact distal pulses.    Murmur heard.  Pulmonary/Chest: Effort normal and breath sounds normal. She has no wheezes. She has no rales.   Abdominal: Soft. Bowel sounds are normal.   Musculoskeletal: Normal range of motion. She exhibits no edema.   Neurological: She is alert and oriented to person, place, and time. She displays normal reflexes. No cranial nerve deficit. She exhibits normal muscle tone.   Skin: Skin is warm and dry. She is not diaphoretic.   Psychiatric: She has a normal mood and affect. Her behavior is normal. Judgment and thought content normal.   Nursing note and vitals reviewed.      Significant Labs: All pertinent labs within the past 24 hours have been reviewed.    Significant Imaging: I have reviewed all pertinent imaging results/findings within the past 24 hours.  I have reviewed and interpreted all pertinent imaging results/findings within the past 24 hours.    Assessment/Plan:      * Hypertensive emergency    Although patient denies non compliance, there are numerous reports reporting medication non compliance. Weaned off nicardipine infusion and now on PO therapy for goal < 150/90 mmHg. Will obtain US to r/o CHIDI          Seizure in response to acute event    Likely 2/2 hypertensive crisis. No evidence of stroke per exam. MRI wo contrast obtained prior to admission reports chronic findings on right frontal lobe concerning for vasculitis vs gliosis. No fever, AMS or gross neuro deficits. No purpura on exam. Contrast with MRI contraindicated as she is ESRD. Will obtain CTA of brain tomorrow to further  evaluate vessels. HD after CTA. Keppra 500 PO BID for now. Neurology on board for co-management          Non compliance w medication regimen    Stress importance of medical compliance. Mother at bedside during conversation. Verbalized understanding.           Uncontrolled hypertension    As above          Elevated troponin    Likely 2/2 demand. Doubt ACS. On secondary prevention except for statin as I do not know if this is safe for a liver allograft although function is stable.           Secondary hyperparathyroidism    No acute issues. Continue current regimen          Immunosuppressed    2/2 prednisone and tacrolimus for liver allograft. No acute issues. Tacro levels daily          Thrombocytopenia    Chronic. Is on ASA. No acute issues          Anemia in ESRD (end-stage renal disease)    Stable, consistent with previous baseline levels. 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 Function stable. On tacrolimus and prednisone            VTE Risk Mitigation         Ordered     heparin (porcine) injection 5,000 Units  Every 8 hours     Route:  Subcutaneous        02/23/18 2339     Medium Risk of VTE  Once      02/23/18 2339        Stable for step down to telemetry floor today    Critical care time spent on the evaluation and treatment of severe organ dysfunction, review of pertinent labs and imaging studies, discussions with consulting providers and discussions with patient/family: > 35 minutes.    Marilu Rocha MD  Department of Hospital Medicine   [facility]

## 2018-02-25 NOTE — PROGRESS NOTES
[facility]  Cardiology  Progress Note    Patient Name: Holly Patel  MRN: [id number]  Admission Date: 2/23/2018  Hospital Length of Stay: 2 days  Code Status: Full Code   Attending Physician: Marilu Pinedo MD   Primary Care Physician: Stan Sosa MD  Expected Discharge Date: 2/24/2018  Principal Problem:Hypertensive emergency    Subjective:     Hospital Course:   2/24: Admitted to ICU for hypertensive crisis    Interval History: Denies any chest pain or shortness of breath    Telemetry: Normal sinus rhythm    Review of Systems   All other systems reviewed and are negative.    Objective:     Vital Signs (Most Recent):  Temp: 98.1 °F (36.7 °C) (02/25/18 1200)  Pulse: 87 (02/25/18 1206)  Resp: (!) 24 (02/25/18 1207)  BP: (!) 150/84 (02/25/18 1202)  SpO2: 100 % (02/25/18 1206) Vital Signs (24h Range):  Temp:  [97.9 °F (36.6 °C)-98.9 °F (37.2 °C)] 98.1 °F (36.7 °C)  Pulse:  [] 87  Resp:  [15-68] 24  SpO2:  [82 %-100 %] 100 %  BP: (149-201)/() 150/84     Weight: 51.4 kg (113 lb 5.1 oz)  Body mass index is 18.86 kg/m².     SpO2: 100 %  O2 Device (Oxygen Therapy): room air      Intake/Output Summary (Last 24 hours) at 02/25/18 1342  Last data filed at 02/25/18 1200   Gross per 24 hour   Intake              600 ml   Output                0 ml   Net              600 ml       Lines/Drains/Airways     Drain                 Hemodialysis AV Fistula Left forearm -- days          Peripheral Intravenous Line                 Peripheral IV - Single Lumen 02/23/18 1737 Right Forearm 1 day         Peripheral IV - Single Lumen 02/23/18 1747 Right Hand 1 day                Physical Exam   Constitutional: She is oriented to person, place, and time. No distress.   Cardiovascular: Normal rate and regular rhythm.    Pulmonary/Chest: Effort normal and breath sounds normal.   Musculoskeletal: She exhibits no edema.   Neurological: She is alert and oriented to person, place, and time.        Significant Labs:   BMP:   Recent Labs  Lab 02/24/18  0247 02/25/18  0302   GLU 97 95   * 137   K 3.4* 3.8   CL 96 99   CO2 27 27   BUN 31* 44*   CREATININE 7.6* 9.9*   CALCIUM 8.1* 8.2*   MG 1.8 2.2    and CBC   Recent Labs  Lab 02/24/18  0247 02/25/18  0302   WBC 3.45* 3.06*   HGB 8.2* 8.2*   HCT 24.8* 23.9*   PLT 82* 79*       Significant Imaging: Echocardiogram:   2D echo with color flow doppler:   Results for orders placed or performed during the hospital encounter of 02/16/18   2D echo with color flow doppler   Result Value Ref Range    EF 65 55 - 65    Diastolic Dysfunction Yes (A)     Est. PA Systolic Pressure 44.24 (A)     Pericardial Effusion SMALL (A)     Mitral Valve Mobility NORMAL     Tricuspid Valve Regurgitation TRIVIAL      Assessment and Plan:     Brief HPI:     ESRD on hemodialysis    Management per renal        Non compliance w medication regimen    Counseled on the importance        Elevated troponin    Likely secondary to demand  Recent ischemic workup within normal limits  No further testing planned            VTE Risk Mitigation         Ordered     heparin (porcine) injection 5,000 Units  Every 8 hours     Route:  Subcutaneous        02/23/18 2339     Medium Risk of VTE  Once      02/23/18 2339        No further workup planned from CV standpoint.  Continue titrate medicines for blood pressure and emphasized again mainly compliance.  We'll see as needed    Jim Jacob MD  Cardiology  [facility]

## 2018-02-25 NOTE — ASSESSMENT & PLAN NOTE
Likely 2/2 demand. Doubt ACS. On secondary prevention except for statin as I do not know if this is safe for a liver allograft although function is stable.

## 2018-02-25 NOTE — SUBJECTIVE & OBJECTIVE
Interval History: BP still high but better. Feels well. Has no new complaints. No seizures. No chest pain    Review of Systems   Constitutional: Negative.    Respiratory: Negative.    Cardiovascular: Negative.    Gastrointestinal: Negative.    Musculoskeletal: Negative.    Neurological: Negative.    Psychiatric/Behavioral: Negative.      Objective:     Vital Signs (Most Recent):  Temp: 97.9 °F (36.6 °C) (02/25/18 0754)  Pulse: 82 (02/25/18 1030)  Resp: 18 (02/25/18 1030)  BP: (!) 160/100 (02/25/18 1002)  SpO2: 100 % (02/25/18 1030) Vital Signs (24h Range):  Temp:  [97.9 °F (36.6 °C)-99 °F (37.2 °C)] 97.9 °F (36.6 °C)  Pulse:  [] 82  Resp:  [12-84] 22  SpO2:  [82 %-100 %] 100 %  BP: (130-201)/() 160/100     Weight: 51.4 kg (113 lb 5.1 oz)  Body mass index is 18.86 kg/m².    Intake/Output Summary (Last 24 hours) at 02/25/18 1046  Last data filed at 02/25/18 0833   Gross per 24 hour   Intake              625 ml   Output                0 ml   Net              625 ml      Physical Exam   Constitutional: She is oriented to person, place, and time. She appears well-developed. No distress.   Eyes: EOM are normal. Pupils are equal, round, and reactive to light.   Cardiovascular: Normal rate, regular rhythm and intact distal pulses.    Murmur heard.  Pulmonary/Chest: Effort normal and breath sounds normal. She has no wheezes. She has no rales.   Abdominal: Soft. Bowel sounds are normal.   Musculoskeletal: Normal range of motion. She exhibits no edema.   Neurological: She is alert and oriented to person, place, and time. She displays normal reflexes. No cranial nerve deficit. She exhibits normal muscle tone.   Skin: Skin is warm and dry. She is not diaphoretic.   Psychiatric: She has a normal mood and affect. Her behavior is normal. Judgment and thought content normal.   Nursing note and vitals reviewed.      Significant Labs: All pertinent labs within the past 24 hours have been reviewed.    Significant Imaging: I  have reviewed all pertinent imaging results/findings within the past 24 hours.  I have reviewed and interpreted all pertinent imaging results/findings within the past 24 hours.

## 2018-02-25 NOTE — ASSESSMENT & PLAN NOTE
Stress importance of medical compliance. Mother at bedside during conversation. Verbalized understanding.

## 2018-02-25 NOTE — PLAN OF CARE
Problem: Patient Care Overview  Goal: Plan of Care Review  Outcome: Ongoing (interventions implemented as appropriate)  Awake alert oriented able to move independently on bed. Blood pressure remains high with systolic from 150's to 200's PRN hydralazine was given.  Denies any pain or discomfort. No fall or injury no skin breakdown noted.

## 2018-02-25 NOTE — SUBJECTIVE & OBJECTIVE
Interval History: Denies any chest pain or shortness of breath    Telemetry: Normal sinus rhythm    Review of Systems   All other systems reviewed and are negative.    Objective:     Vital Signs (Most Recent):  Temp: 98.1 °F (36.7 °C) (02/25/18 1200)  Pulse: 87 (02/25/18 1206)  Resp: (!) 24 (02/25/18 1207)  BP: (!) 150/84 (02/25/18 1202)  SpO2: 100 % (02/25/18 1206) Vital Signs (24h Range):  Temp:  [97.9 °F (36.6 °C)-98.9 °F (37.2 °C)] 98.1 °F (36.7 °C)  Pulse:  [] 87  Resp:  [15-68] 24  SpO2:  [82 %-100 %] 100 %  BP: (149-201)/() 150/84     Weight: 51.4 kg (113 lb 5.1 oz)  Body mass index is 18.86 kg/m².     SpO2: 100 %  O2 Device (Oxygen Therapy): room air      Intake/Output Summary (Last 24 hours) at 02/25/18 1342  Last data filed at 02/25/18 1200   Gross per 24 hour   Intake              600 ml   Output                0 ml   Net              600 ml       Lines/Drains/Airways     Drain                 Hemodialysis AV Fistula Left forearm -- days          Peripheral Intravenous Line                 Peripheral IV - Single Lumen 02/23/18 1737 Right Forearm 1 day         Peripheral IV - Single Lumen 02/23/18 1747 Right Hand 1 day                Physical Exam   Constitutional: She is oriented to person, place, and time. No distress.   Cardiovascular: Normal rate and regular rhythm.    Pulmonary/Chest: Effort normal and breath sounds normal.   Musculoskeletal: She exhibits no edema.   Neurological: She is alert and oriented to person, place, and time.       Significant Labs:   BMP:   Recent Labs  Lab 02/24/18  0247 02/25/18  0302   GLU 97 95   * 137   K 3.4* 3.8   CL 96 99   CO2 27 27   BUN 31* 44*   CREATININE 7.6* 9.9*   CALCIUM 8.1* 8.2*   MG 1.8 2.2    and CBC   Recent Labs  Lab 02/24/18  0247 02/25/18  0302   WBC 3.45* 3.06*   HGB 8.2* 8.2*   HCT 24.8* 23.9*   PLT 82* 79*       Significant Imaging: Echocardiogram:   2D echo with color flow doppler:   Results for orders placed or performed during  the hospital encounter of 02/16/18   2D echo with color flow doppler   Result Value Ref Range    EF 65 55 - 65    Diastolic Dysfunction Yes (A)     Est. PA Systolic Pressure 44.24 (A)     Pericardial Effusion SMALL (A)     Mitral Valve Mobility NORMAL     Tricuspid Valve Regurgitation TRIVIAL

## 2018-02-25 NOTE — CONSULTS
Renal Progress Note    Date of Admission: 2/23/2018 10:18 PM      Review of Systems:    No further seizures, offers no complaints.    Physical Exam:    Vitals:    02/25/18 1200 02/25/18 1202 02/25/18 1206 02/25/18 1207   BP:  (!) 150/84     Pulse: 85  87    Resp: (!) 24   (!) 24   Temp: 98.1 °F (36.7 °C)      TempSrc: Oral      SpO2: 100%  100%    Weight:       Height:         I/O last 3 completed shifts:  In: 920 [P.O.:720; I.V.:100; IV Piggyback:100]  Out: -   I/O this shift:  In: 480 [P.O.:480]  Out: -       Constitutional: She appears well-developed and well-nourished. No distress.   HENT:   Head: Normocephalic  Neck: supple.   Cardiovascular: Normal rate, regular rhythm and normal heart sounds.   Pulmonary: Breath sounds normal.  Abdominal: normal.   Musculoskeletal: no edema   + thrill to AV fistula on left forearm   Neurological: She is alert and oriented to person, place, and time.     Laboratories:      Recent Labs  Lab 02/25/18  0302   WBC 3.06*   RBC 3.20*   HGB 8.2*   HCT 23.9*   PLT 79*   MCV 75*   MCH 25.6*   MCHC 34.3       Recent Labs  Lab 02/25/18  0302   CALCIUM 8.2*   PROT 6.7      K 3.8   CO2 27   CL 99   BUN 44*   CREATININE 9.9*   ALKPHOS 572*   ALT 37   AST 40   BILITOT 0.5     Phosphorus         6.1        5.3  Phosphorus     Magnesium          2.2 2.1 1.8     1.8  Magnesium     Diagnostic Tests: n/a    Assessment:    26 y/o female with Hx. Malignant HTN, ESRD on HD and Hx. Liver transplant with chronic rejection  admitted with:    - Breaktrough seizures? Pte. Claims taking her anti seizure meds. At home  - Anemia of CKD  - Mild hypokalemia      Plan:    - Resume dialysis in a.m.  - Epogen w/ HD  - BP control  - Renal diet  - Neurology following      Will f/u on Dialysis days only

## 2018-02-25 NOTE — ASSESSMENT & PLAN NOTE
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 Function stable. On tacrolimus and prednisone

## 2018-02-25 NOTE — PROVIDER TRANSFER
ICU transfer note    Ms Patel presented with hypertensive emergency as evidenced by elevated troponin and tonic clonic seizure event. She was just admitted a couple of days ago for the same thing. Admitted to ICU for nicardipine infusion. Trop mildly elevated to 0.08 without STEMI on EKG. Had a chest pain not typical for angina. Trop likely demand. Weaned off nicardipine infusion on 2/24 and transitioned to PO meds. Seizure controlled after one dose of keppra 1000 mg IV. Had this same issue on a recent admission for the same issues. Patient is reported to be non compliant. Neurology consulted on last admission. Planned for MRI as CT showed no acute abnormalities. MRI wo contrast showed chronic findings in right frontal lobe concerning for vasculitis vs nonspecific areas of gliosis or related to migraine syndromes. Has no fever, headache or AMS. Contrast for MRI cannot be given as she is ESRD, therefore CTA of brain ordered for tomorrow prior to HD to further evaluate vessels. Neuro, cards and nephrology on board. Tentative discharge tomorrow after HD pending CTA results.

## 2018-02-25 NOTE — HOSPITAL COURSE
Ms Patel presented with hypertensive emergency as evidenced by elevated troponin and tonic clonic seizure event. Admitted to ICU for nicardipine infusion. Trop mildly elevated to 0.08 without STEMI on EKG. Had a chest pain not typical for angina. Trop likely demand. Weaned off nicardipine infusion on 2/24 and transitioned to PO meds. Seizure better controlled after one dose of keppra 1000 mg IV. Had this same issue on a recent admission for the same issues. Patient is reported to be non compliant. Neurology consulted on last admission. Planned for MRI as CT showed no acute abnormalities. MRI wo contrast showed chronic findings in right frontal lobe concerning for vasculitis vs nonspecific areas of gliosis or related to migraine syndromes. Has no fever, headache or AMS. Contrast for MRI cannot be given as she is ESRD, therefore CTA of brain was ordered,which showed no acute process. Stepped down to telemetry floor on 2/25,she has been again consulted compliance with medication,she does no know name of medication or dosage of medication.had  family meeting  And all medication with dosage has been explained again,  She had couple more seizure activity on floor,neurlogy started also on Vimapt,added to Keprra,she remains stable in last 2-3 days with stable VS,she had also routine HD with PRBC with HD duo to anemia in ESRD with improvement in H/H.  Patient has been discharged home with follow up plan with PCP and neurology,out patient neurology follow up by  has been arranged.

## 2018-02-26 LAB
ALBUMIN SERPL BCP-MCNC: 2.6 G/DL
ALP SERPL-CCNC: 499 U/L
ALT SERPL W/O P-5'-P-CCNC: 35 U/L
ANION GAP SERPL CALC-SCNC: 12 MMOL/L
ANISOCYTOSIS BLD QL SMEAR: SLIGHT
AST SERPL-CCNC: 31 U/L
BASOPHILS # BLD AUTO: 0.01 K/UL
BASOPHILS NFR BLD: 0.3 %
BILIRUB SERPL-MCNC: 0.4 MG/DL
BUN SERPL-MCNC: 52 MG/DL
CALCIUM SERPL-MCNC: 8.1 MG/DL
CHLORIDE SERPL-SCNC: 99 MMOL/L
CO2 SERPL-SCNC: 23 MMOL/L
CREAT SERPL-MCNC: 12 MG/DL
DIFFERENTIAL METHOD: ABNORMAL
EOSINOPHIL # BLD AUTO: 0.6 K/UL
EOSINOPHIL NFR BLD: 18.9 %
ERYTHROCYTE [DISTWIDTH] IN BLOOD BY AUTOMATED COUNT: 16.4 %
EST. GFR  (AFRICAN AMERICAN): 4 ML/MIN/1.73 M^2
EST. GFR  (NON AFRICAN AMERICAN): 4 ML/MIN/1.73 M^2
GLUCOSE SERPL-MCNC: 92 MG/DL
HCT VFR BLD AUTO: 22.7 %
HGB BLD-MCNC: 7.6 G/DL
LYMPHOCYTES # BLD AUTO: 0.7 K/UL
LYMPHOCYTES NFR BLD: 22.4 %
MAGNESIUM SERPL-MCNC: 2 MG/DL
MCH RBC QN AUTO: 25 PG
MCHC RBC AUTO-ENTMCNC: 33.5 G/DL
MCV RBC AUTO: 75 FL
MONOCYTES # BLD AUTO: 0.2 K/UL
MONOCYTES NFR BLD: 6.4 %
NEUTROPHILS # BLD AUTO: 1.6 K/UL
NEUTROPHILS NFR BLD: 52 %
PHOSPHATE SERPL-MCNC: 6.8 MG/DL
PLATELET # BLD AUTO: 86 K/UL
PLATELET BLD QL SMEAR: ABNORMAL
PMV BLD AUTO: 9.6 FL
POTASSIUM SERPL-SCNC: 4.1 MMOL/L
PROT SERPL-MCNC: 6.6 G/DL
RBC # BLD AUTO: 3.04 M/UL
SODIUM SERPL-SCNC: 134 MMOL/L
TACROLIMUS BLD-MCNC: 3.2 NG/ML
WBC # BLD AUTO: 3.12 K/UL

## 2018-02-26 PROCEDURE — 12000002 HC ACUTE/MED SURGE SEMI-PRIVATE ROOM

## 2018-02-26 PROCEDURE — 63600175 PHARM REV CODE 636 W HCPCS: Performed by: HOSPITALIST

## 2018-02-26 PROCEDURE — 25500020 PHARM REV CODE 255: Performed by: HOSPITALIST

## 2018-02-26 PROCEDURE — 25000003 PHARM REV CODE 250: Performed by: HOSPITALIST

## 2018-02-26 PROCEDURE — 80053 COMPREHEN METABOLIC PANEL: CPT

## 2018-02-26 PROCEDURE — 25000003 PHARM REV CODE 250: Performed by: INTERNAL MEDICINE

## 2018-02-26 PROCEDURE — 63600175 PHARM REV CODE 636 W HCPCS: Performed by: INTERNAL MEDICINE

## 2018-02-26 PROCEDURE — 85025 COMPLETE CBC W/AUTO DIFF WBC: CPT

## 2018-02-26 PROCEDURE — 80197 ASSAY OF TACROLIMUS: CPT

## 2018-02-26 PROCEDURE — 36415 COLL VENOUS BLD VENIPUNCTURE: CPT

## 2018-02-26 PROCEDURE — A4216 STERILE WATER/SALINE, 10 ML: HCPCS | Performed by: HOSPITALIST

## 2018-02-26 PROCEDURE — 84100 ASSAY OF PHOSPHORUS: CPT

## 2018-02-26 PROCEDURE — 90935 HEMODIALYSIS ONE EVALUATION: CPT

## 2018-02-26 PROCEDURE — 83735 ASSAY OF MAGNESIUM: CPT

## 2018-02-26 RX ORDER — CLONIDINE 0.3 MG/24H
1 PATCH, EXTENDED RELEASE TRANSDERMAL
Status: DISCONTINUED | OUTPATIENT
Start: 2018-02-26 | End: 2018-03-01

## 2018-02-26 RX ORDER — DIPHENHYDRAMINE HCL 25 MG
25 CAPSULE ORAL EVERY 6 HOURS PRN
Status: DISCONTINUED | OUTPATIENT
Start: 2018-02-26 | End: 2018-03-01 | Stop reason: HOSPADM

## 2018-02-26 RX ORDER — LABETALOL 100 MG/1
800 TABLET, FILM COATED ORAL EVERY 12 HOURS
Status: DISCONTINUED | OUTPATIENT
Start: 2018-02-26 | End: 2018-02-26

## 2018-02-26 RX ORDER — LABETALOL 100 MG/1
400 TABLET, FILM COATED ORAL EVERY 8 HOURS
Status: DISCONTINUED | OUTPATIENT
Start: 2018-02-26 | End: 2018-03-01 | Stop reason: HOSPADM

## 2018-02-26 RX ORDER — HYDRALAZINE HYDROCHLORIDE 25 MG/1
25 TABLET, FILM COATED ORAL EVERY 8 HOURS
Status: DISCONTINUED | OUTPATIENT
Start: 2018-02-26 | End: 2018-02-26

## 2018-02-26 RX ADMIN — ASPIRIN 81 MG 81 MG: 81 TABLET ORAL at 08:02

## 2018-02-26 RX ADMIN — SODIUM CHLORIDE, PRESERVATIVE FREE 3 ML: 5 INJECTION INTRAVENOUS at 05:02

## 2018-02-26 RX ADMIN — TACROLIMUS 5 MG: 1 CAPSULE ORAL at 05:02

## 2018-02-26 RX ADMIN — PREDNISONE 1 MG: 1 TABLET ORAL at 08:02

## 2018-02-26 RX ADMIN — TACROLIMUS 5 MG: 1 CAPSULE ORAL at 08:02

## 2018-02-26 RX ADMIN — NIFEDIPINE 60 MG: 30 TABLET, FILM COATED, EXTENDED RELEASE ORAL at 08:02

## 2018-02-26 RX ADMIN — LEVETIRACETAM 500 MG: 500 TABLET, FILM COATED ORAL at 08:02

## 2018-02-26 RX ADMIN — LABETALOL HYDROCHLORIDE 400 MG: 100 TABLET, FILM COATED ORAL at 05:02

## 2018-02-26 RX ADMIN — SODIUM CHLORIDE, PRESERVATIVE FREE 3 ML: 5 INJECTION INTRAVENOUS at 09:02

## 2018-02-26 RX ADMIN — LABETALOL HYDROCHLORIDE 400 MG: 100 TABLET, FILM COATED ORAL at 08:02

## 2018-02-26 RX ADMIN — LABETALOL HYDROCHLORIDE 400 MG: 100 TABLET, FILM COATED ORAL at 09:02

## 2018-02-26 RX ADMIN — CINACALCET HYDROCHLORIDE 30 MG: 30 TABLET, COATED ORAL at 08:02

## 2018-02-26 RX ADMIN — PANTOPRAZOLE SODIUM 40 MG: 40 TABLET, DELAYED RELEASE ORAL at 08:02

## 2018-02-26 RX ADMIN — DIPHENHYDRAMINE HYDROCHLORIDE 25 MG: 25 CAPSULE ORAL at 09:02

## 2018-02-26 RX ADMIN — HYDRALAZINE HYDROCHLORIDE 10 MG: 20 INJECTION INTRAMUSCULAR; INTRAVENOUS at 11:02

## 2018-02-26 RX ADMIN — HEPARIN SODIUM 5000 UNITS: 5000 INJECTION, SOLUTION INTRAVENOUS; SUBCUTANEOUS at 09:02

## 2018-02-26 RX ADMIN — HEPARIN SODIUM 5000 UNITS: 5000 INJECTION, SOLUTION INTRAVENOUS; SUBCUTANEOUS at 05:02

## 2018-02-26 RX ADMIN — HEPARIN SODIUM 5000 UNITS: 5000 INJECTION, SOLUTION INTRAVENOUS; SUBCUTANEOUS at 06:02

## 2018-02-26 RX ADMIN — DIPHENHYDRAMINE HYDROCHLORIDE 25 MG: 25 CAPSULE ORAL at 01:02

## 2018-02-26 RX ADMIN — LORAZEPAM 2 MG: 2 INJECTION, SOLUTION INTRAMUSCULAR; INTRAVENOUS at 12:02

## 2018-02-26 RX ADMIN — LEVETIRACETAM 500 MG: 500 TABLET, FILM COATED ORAL at 09:02

## 2018-02-26 RX ADMIN — IOHEXOL 75 ML: 350 INJECTION, SOLUTION INTRAVENOUS at 09:02

## 2018-02-26 RX ADMIN — SODIUM CHLORIDE, PRESERVATIVE FREE 3 ML: 5 INJECTION INTRAVENOUS at 08:02

## 2018-02-26 NOTE — NURSING
Patient arrived on unit @1700 and treated for HTN.  Telemetry in place.  No complaints of pain.  Will continue to monitor.

## 2018-02-26 NOTE — PROGRESS NOTES
Post CT w/contrast per report., pt presented to the dialysis Acute room for routine HD tx. Pt assessed, denies any c/c at present. , bed not zeroed, for bedscale use. Pt stated that she could stand up without difficulty, bed successfully zeroed. Pt placed back in bed , and reweighed: 54.1Kg, EDW: 54kg as reported per pt. Left UE AVF accessed according to P&P, successfully cannulated with 15 ga dialysis angios x 2, good blood return, easily flushed, all connections attached and secured. 3.5 hour hd tx initiated and in progress. Will continue to monitor for changes and trends.

## 2018-02-26 NOTE — ASSESSMENT & PLAN NOTE
Although patient denies non compliance, there are numerous reports reporting medication non compliance. Weaned off nicardipine infusion and now on PO therapy for goal < 150/90 mmHg.  US to r/oed out  CHIDI,,she has been again consulted compliance with medication,she does no know name of medication or dosage of medication.

## 2018-02-26 NOTE — CONSULTS
Renal Progress Note    Date of Admission: 2/23/2018 10:18 PM      Review of Systems: Pte had tonic clonic seizure half way trough her Dialysis today, currently looks post-ictal.      Physical Exam:    Vitals:    02/26/18 1230 02/26/18 1256 02/26/18 1330 02/26/18 1345   BP: (!) 137/92 (!) 158/100 (!) 167/109 (!) 143/87   BP Location:   Right arm Right arm   Patient Position:   Lying Lying   Pulse: 81 91 90 83   Resp:   20    Temp:   98.5 °F (36.9 °C) 98.4 °F (36.9 °C)   TempSrc:   Axillary Oral   SpO2:   97% 100%   Weight:       Height:         I/O last 3 completed shifts:  In: 660 [P.O.:660]  Out: -   No intake/output data recorded.      Constitutional: She appears well-developed and well-nourished. No distress.   HENT:   Head: Normocephalic  Neck: supple.   Cardiovascular: Normal rate, regular rhythm and normal heart sounds.   Pulmonary: Breath sounds normal.  Abdominal: normal.   Musculoskeletal: no edema   + thrill to AV fistula on left forearm   Neurological: She is obtunded at present    Laboratories:      Recent Labs  Lab 02/26/18  0435   WBC 3.12*   RBC 3.04*   HGB 7.6*   HCT 22.7*   PLT 86*   MCV 75*   MCH 25.0*   MCHC 33.5       Recent Labs  Lab 02/26/18  0435   CALCIUM 8.1*   PROT 6.6   *   K 4.1   CO2 23   CL 99   BUN 52*   CREATININE 12.0*   ALKPHOS 499*   ALT 35   AST 31   BILITOT 0.4     Phosphorus         6.1        5.3  Phosphorus     Magnesium          2.2 2.1 1.8     1.8  Magnesium     Diagnostic Tests: n/a    Assessment:    28 y/o female with Hx. Malignant HTN, ESRD on HD and Hx. Liver transplant with chronic rejection  admitted with:    - Breaktrough seizures  - Anemia of CKD  - Mild hypokalemia  - Controlled hyperphosphatemia      Plan:    - Neurology following, will need changes to her current medication  - Dialysis support  - Epogen w/ HD  - BP control  - Renal diet  - Neurology following

## 2018-02-26 NOTE — PLAN OF CARE
Problem: Patient Care Overview  Goal: Plan of Care Review  Outcome: Ongoing (interventions implemented as appropriate)   02/26/18 4853   Coping/Psychosocial   Plan Of Care Reviewed With patient       Comments: Resting at present after earlier benadryl for itching. Blood pressure improved after pt asleep. Turns self while in bed. No attempts to get out of bed. No seizure activity so far this shift. Family at bedside.

## 2018-02-26 NOTE — NURSING
Notified Dr Harper of seizure in HD, verbalized understanding, he will review test result and medication.

## 2018-02-26 NOTE — NURSING
HD called to say patient having a seizure and she called the  ICU nurse.  Went in to assess no pt awake answering appropriated. quiet calm. Ativan Med admin via HD site by HD nurse/Mac. 158/100 Sat 100%  HR 92 . They will continue to monitor . Notified Tele, states no unusual activity, SR 80.

## 2018-02-26 NOTE — PROGRESS NOTES
[facility] Hospital Medicine  Progress Note    Patient Name: Holly Patel  MRN: [id number]  Patient Class: IP- Inpatient   Admission Date: 2/23/2018  Length of Stay: 3 days  Attending Physician: Farheen Tellez MD  Primary Care Provider: Stan Sosa MD        Subjective:     Principal Problem:Hypertensive emergency    HPI:  Holly Patel is a 27 y.o. female that (in part)  has a past medical history of Anemia in ESRD (end-stage renal disease); Chronic rejection of liver transplant; Encounter for blood transfusion; ESRD on hemodialysis; History of splenomegaly; Immunosuppressed; Iron deficiency anemia secondary to inadequate dietary iron intake; Liver replaced by transplant; Moderate protein-calorie malnutrition; MRSA bacteremia; Prophylactic immunotherapy; Renovascular hypertension; Secondary hyperparathyroidism; and Thrombocytopenia. Presents to [facility] as a transfer patient from the [facility] Emergency Department for evaluation of hypertensive emergency.  She was admitted recently for hypertensive emergency.  She complained of shortness of breath , but denied chest pain, abdominal pain, nausea, or vomiting.  Associated with a Nonproductive cough.  She had dialysis earlier today without issue.  She does report dyspnea worsened with exertion and minimally relieved with rest and supplemental oxygen given in the ED.  Located in the lungs.  Constant duration.  Recurring frequency.  Moderate severity.    In the emergency department routine laboratory studies, EKG, cardiac enzymes were obtained.  There is concern for new development of anterior lateral T-wave inversions.  Repeat EKG even after treatment for hypertensive emergency demonstrated the EKG changes.   Hospital medicine has been asked to admit for further evaluation and treatment as a transfer patient.  Shortly after arrival the patient had a seizure that lasted less than 1 minute.   There is questionable compliance with her home seizure medication regimen.   She was given a Keppra loading dose and has not had any seizures since the initial event.    Hospital Course:  Ms Patel presented with hypertensive emergency as evidenced by elevated troponin and tonic clonic seizure event. Admitted to ICU for nicardipine infusion. Trop mildly elevated to 0.08 without STEMI on EKG. Had a chest pain not typical for angina. Trop likely demand. Weaned off nicardipine infusion on 2/24 and transitioned to PO meds. Seizure controlled after one dose of keppra 1000 mg IV. Had this same issue on a recent admission for the same issues. Patient is reported to be non compliant. Neurology consulted on last admission. Planned for MRI as CT showed no acute abnormalities. MRI wo contrast showed chronic findings in right frontal lobe concerning for vasculitis vs nonspecific areas of gliosis or related to migraine syndromes. Has no fever, headache or AMS. Contrast for MRI cannot be given as she is ESRD, therefore CTA of brain ordered to further evaluate vessels. Stepped down to telemetry floor on 2/25,she has been again consulted compliance with medication,she does no know name of medication or dosage of medication.    Interval History: BP  better. Feels well. Has no new complaints. No seizures. No chest pain    Review of Systems   Constitutional: Negative.    Respiratory: Negative.    Cardiovascular: Negative.    Gastrointestinal: Negative.    Musculoskeletal: Negative.    Neurological: Negative.    Psychiatric/Behavioral: Negative.      Objective:     Vital Signs (Most Recent):  Temp: 97.9 °F (36.6 °C) (02/25/18 0754)  Pulse: 82 (02/25/18 1030)  Resp: 18 (02/25/18 1030)  BP: (!) 160/100 (02/25/18 1002)  SpO2: 100 % (02/25/18 1030) Vital Signs (24h Range):  Temp:  [97.8 °F (36.6 °C)-98.6 °F (37 °C)] 98 °F (36.7 °C)  Pulse:  [] 90  Resp:  [17-61] 17  SpO2:  [98 %-100 %] 100 %  BP: (133-198)/() 133/90      Weight: 53 kg (116 lb 13.5 oz)  Body mass index is 19.44 kg/m².    Intake/Output Summary (Last 24 hours) at 02/26/18 0653  Last data filed at 02/25/18 1937   Gross per 24 hour   Intake              660 ml   Output                0 ml   Net              660 ml      Physical Exam   Constitutional: She is oriented to person, place, and time. She appears well-developed. No distress.   Eyes: EOM are normal. Pupils are equal, round, and reactive to light.   Cardiovascular: Normal rate, regular rhythm and intact distal pulses.    Murmur heard.  Pulmonary/Chest: Effort normal and breath sounds normal. She has no wheezes. She has no rales.   Abdominal: Soft. Bowel sounds are normal.   Musculoskeletal: Normal range of motion. She exhibits no edema.   Neurological: She is alert and oriented to person, place, and time. She displays normal reflexes. No cranial nerve deficit. She exhibits normal muscle tone.   Skin: Skin is warm and dry. She is not diaphoretic.   Psychiatric: She has a normal mood and affect. Her behavior is normal. Judgment and thought content normal.   Nursing note and vitals reviewed.      Significant Labs: All pertinent labs within the past 24 hours have been reviewed.    Significant Imaging: I have reviewed all pertinent imaging results/findings within the past 24 hours.  I have reviewed and interpreted all pertinent imaging results/findings within the past 24 hours.    Assessment/Plan:      * Hypertensive emergency    Although patient denies non compliance, there are numerous reports reporting medication non compliance. Weaned off nicardipine infusion and now on PO therapy for goal < 150/90 mmHg.  US to r/oed out  CHIDI,,she has been again consulted compliance with medication,she does no know name of medication or dosage of medication.          Seizure in response to acute event    Likely 2/2 hypertensive crisis. No evidence of stroke per exam. MRI wo contrast obtained prior to admission reports chronic  findings on right frontal lobe concerning for vasculitis vs gliosis. No fever, AMS or gross neuro deficits. No purpura on exam. Contrast with MRI contraindicated as she is ESRD. Will obtain CTA of brain tomorrow to further evaluate vessels. HD after CTA. Keppra 500 PO BID for now. Neurology on board for co-management          Elevated troponin    Likely 2/2 demand. Doubt ACS. On secondary prevention except for statin as I do not know if this is safe for a liver allograft although function is stable.           Uncontrolled hypertension    As above          Secondary hyperparathyroidism    No acute issues. Continue current regimen          Immunosuppressed    2/2 prednisone and tacrolimus for liver allograft. No acute issues. Tacro levels daily          Non compliance w medication regimen    Stress importance of medical compliance. Mother at bedside during conversation. Verbalized understanding.           Thrombocytopenia    Chronic. Is on ASA. No acute issues          Anemia in ESRD (end-stage renal disease)    Stable, consistent with previous baseline levels. 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 Function stable. On tacrolimus and prednisone            VTE Risk Mitigation         Ordered     heparin (porcine) injection 5,000 Units  Every 8 hours     Route:  Subcutaneous        02/23/18 2337     Medium Risk of VTE  Once      02/23/18 2333              Farheen Tellez MD  Department of Hospital Medicine   [facility]

## 2018-02-26 NOTE — NURSING
Patient back to room via bed escorted by HD nurse/Mac, states Dr Morgan wants Neuro to be notified of seizure in HD. Notified Dr Dodge of patient's seizure in HD, back in room, had Ativian in HD for seizure as ordered, back in room awake answering appropriately, VS wnl and charted no distress noted. Verbalized understanding instructed to notify Dr Gonzalze of patient's seizure. Notified tele/Felicia pt back in room. Verbalized understanding. Continue to monitor. Father at bedside

## 2018-02-26 NOTE — SUBJECTIVE & OBJECTIVE
Interval History: BP  better. Feels well. Has no new complaints. No seizures. No chest pain    Review of Systems   Constitutional: Negative.    Respiratory: Negative.    Cardiovascular: Negative.    Gastrointestinal: Negative.    Musculoskeletal: Negative.    Neurological: Negative.    Psychiatric/Behavioral: Negative.      Objective:     Vital Signs (Most Recent):  Temp: 97.9 °F (36.6 °C) (02/25/18 0754)  Pulse: 82 (02/25/18 1030)  Resp: 18 (02/25/18 1030)  BP: (!) 160/100 (02/25/18 1002)  SpO2: 100 % (02/25/18 1030) Vital Signs (24h Range):  Temp:  [97.8 °F (36.6 °C)-98.6 °F (37 °C)] 98 °F (36.7 °C)  Pulse:  [] 90  Resp:  [17-61] 17  SpO2:  [98 %-100 %] 100 %  BP: (133-198)/() 133/90     Weight: 53 kg (116 lb 13.5 oz)  Body mass index is 19.44 kg/m².    Intake/Output Summary (Last 24 hours) at 02/26/18 0653  Last data filed at 02/25/18 1937   Gross per 24 hour   Intake              660 ml   Output                0 ml   Net              660 ml      Physical Exam   Constitutional: She is oriented to person, place, and time. She appears well-developed. No distress.   Eyes: EOM are normal. Pupils are equal, round, and reactive to light.   Cardiovascular: Normal rate, regular rhythm and intact distal pulses.    Murmur heard.  Pulmonary/Chest: Effort normal and breath sounds normal. She has no wheezes. She has no rales.   Abdominal: Soft. Bowel sounds are normal.   Musculoskeletal: Normal range of motion. She exhibits no edema.   Neurological: She is alert and oriented to person, place, and time. She displays normal reflexes. No cranial nerve deficit. She exhibits normal muscle tone.   Skin: Skin is warm and dry. She is not diaphoretic.   Psychiatric: She has a normal mood and affect. Her behavior is normal. Judgment and thought content normal.   Nursing note and vitals reviewed.      Significant Labs: All pertinent labs within the past 24 hours have been reviewed.    Significant Imaging: I have reviewed all  pertinent imaging results/findings within the past 24 hours.  I have reviewed and interpreted all pertinent imaging results/findings within the past 24 hours.

## 2018-02-26 NOTE — NURSING
pt going to CT via wheelchair with transport. No distress noted. Notified Tele/Felicia verbalized understanding.

## 2018-02-27 LAB
ABO + RH BLD: NORMAL
ALBUMIN SERPL BCP-MCNC: 2.5 G/DL
ALP SERPL-CCNC: 536 U/L
ALT SERPL W/O P-5'-P-CCNC: 37 U/L
ANION GAP SERPL CALC-SCNC: 8 MMOL/L
AST SERPL-CCNC: 34 U/L
BASOPHILS # BLD AUTO: 0.01 K/UL
BASOPHILS NFR BLD: 0.4 %
BILIRUB SERPL-MCNC: 0.4 MG/DL
BLD GP AB SCN CELLS X3 SERPL QL: NORMAL
BUN SERPL-MCNC: 46 MG/DL
CALCIUM SERPL-MCNC: 8.1 MG/DL
CHLORIDE SERPL-SCNC: 102 MMOL/L
CO2 SERPL-SCNC: 26 MMOL/L
CREAT SERPL-MCNC: 10.8 MG/DL
DIFFERENTIAL METHOD: ABNORMAL
EOSINOPHIL # BLD AUTO: 0.4 K/UL
EOSINOPHIL NFR BLD: 14.1 %
ERYTHROCYTE [DISTWIDTH] IN BLOOD BY AUTOMATED COUNT: 16.4 %
EST. GFR  (AFRICAN AMERICAN): 5 ML/MIN/1.73 M^2
EST. GFR  (NON AFRICAN AMERICAN): 4 ML/MIN/1.73 M^2
GLUCOSE SERPL-MCNC: 92 MG/DL
HCT VFR BLD AUTO: 20.7 %
HGB BLD-MCNC: 6.7 G/DL
LYMPHOCYTES # BLD AUTO: 0.7 K/UL
LYMPHOCYTES NFR BLD: 24.5 %
MAGNESIUM SERPL-MCNC: 1.8 MG/DL
MCH RBC QN AUTO: 24.5 PG
MCHC RBC AUTO-ENTMCNC: 32.4 G/DL
MCV RBC AUTO: 76 FL
MONOCYTES # BLD AUTO: 0.2 K/UL
MONOCYTES NFR BLD: 6.3 %
NEUTROPHILS # BLD AUTO: 1.5 K/UL
NEUTROPHILS NFR BLD: 54.7 %
PHOSPHATE SERPL-MCNC: 6.1 MG/DL
PLATELET # BLD AUTO: 73 K/UL
PMV BLD AUTO: 9.7 FL
POTASSIUM SERPL-SCNC: 4.5 MMOL/L
PROT SERPL-MCNC: 6.3 G/DL
RBC # BLD AUTO: 2.73 M/UL
SODIUM SERPL-SCNC: 136 MMOL/L
TACROLIMUS BLD-MCNC: 3.7 NG/ML
WBC # BLD AUTO: 2.69 K/UL

## 2018-02-27 PROCEDURE — 86850 RBC ANTIBODY SCREEN: CPT

## 2018-02-27 PROCEDURE — 63600175 PHARM REV CODE 636 W HCPCS: Performed by: HOSPITALIST

## 2018-02-27 PROCEDURE — 94761 N-INVAS EAR/PLS OXIMETRY MLT: CPT

## 2018-02-27 PROCEDURE — 80197 ASSAY OF TACROLIMUS: CPT

## 2018-02-27 PROCEDURE — 25000003 PHARM REV CODE 250: Performed by: INTERNAL MEDICINE

## 2018-02-27 PROCEDURE — A4216 STERILE WATER/SALINE, 10 ML: HCPCS | Performed by: HOSPITALIST

## 2018-02-27 PROCEDURE — 25000003 PHARM REV CODE 250: Performed by: PSYCHIATRY & NEUROLOGY

## 2018-02-27 PROCEDURE — C9254 INJECTION, LACOSAMIDE: HCPCS | Performed by: PSYCHIATRY & NEUROLOGY

## 2018-02-27 PROCEDURE — 25000003 PHARM REV CODE 250: Performed by: HOSPITALIST

## 2018-02-27 PROCEDURE — 12000002 HC ACUTE/MED SURGE SEMI-PRIVATE ROOM

## 2018-02-27 PROCEDURE — 86920 COMPATIBILITY TEST SPIN: CPT

## 2018-02-27 PROCEDURE — 83735 ASSAY OF MAGNESIUM: CPT

## 2018-02-27 PROCEDURE — 80053 COMPREHEN METABOLIC PANEL: CPT

## 2018-02-27 PROCEDURE — 36415 COLL VENOUS BLD VENIPUNCTURE: CPT

## 2018-02-27 PROCEDURE — 99232 SBSQ HOSP IP/OBS MODERATE 35: CPT | Mod: ,,, | Performed by: PSYCHIATRY & NEUROLOGY

## 2018-02-27 PROCEDURE — 63600175 PHARM REV CODE 636 W HCPCS: Performed by: PSYCHIATRY & NEUROLOGY

## 2018-02-27 PROCEDURE — 84100 ASSAY OF PHOSPHORUS: CPT

## 2018-02-27 PROCEDURE — 85025 COMPLETE CBC W/AUTO DIFF WBC: CPT

## 2018-02-27 RX ORDER — LACOSAMIDE 50 MG/1
50 TABLET ORAL EVERY 12 HOURS
Status: DISCONTINUED | OUTPATIENT
Start: 2018-02-27 | End: 2018-03-01 | Stop reason: HOSPADM

## 2018-02-27 RX ADMIN — HEPARIN SODIUM 5000 UNITS: 5000 INJECTION, SOLUTION INTRAVENOUS; SUBCUTANEOUS at 05:02

## 2018-02-27 RX ADMIN — ASPIRIN 81 MG 81 MG: 81 TABLET ORAL at 08:02

## 2018-02-27 RX ADMIN — LACOSAMIDE 50 MG: 50 TABLET, FILM COATED ORAL at 09:02

## 2018-02-27 RX ADMIN — SODIUM CHLORIDE, PRESERVATIVE FREE 3 ML: 5 INJECTION INTRAVENOUS at 08:02

## 2018-02-27 RX ADMIN — HEPARIN SODIUM 5000 UNITS: 5000 INJECTION, SOLUTION INTRAVENOUS; SUBCUTANEOUS at 09:02

## 2018-02-27 RX ADMIN — LEVETIRACETAM 500 MG: 500 TABLET, FILM COATED ORAL at 09:02

## 2018-02-27 RX ADMIN — CINACALCET HYDROCHLORIDE 30 MG: 30 TABLET, COATED ORAL at 08:02

## 2018-02-27 RX ADMIN — LABETALOL HYDROCHLORIDE 400 MG: 100 TABLET, FILM COATED ORAL at 09:02

## 2018-02-27 RX ADMIN — SODIUM CHLORIDE 50 MG: 9 INJECTION, SOLUTION INTRAVENOUS at 12:02

## 2018-02-27 RX ADMIN — SODIUM CHLORIDE, PRESERVATIVE FREE 3 ML: 5 INJECTION INTRAVENOUS at 03:02

## 2018-02-27 RX ADMIN — LEVETIRACETAM 500 MG: 500 TABLET, FILM COATED ORAL at 08:02

## 2018-02-27 RX ADMIN — LABETALOL HYDROCHLORIDE 400 MG: 100 TABLET, FILM COATED ORAL at 03:02

## 2018-02-27 RX ADMIN — PANTOPRAZOLE SODIUM 40 MG: 40 TABLET, DELAYED RELEASE ORAL at 08:02

## 2018-02-27 RX ADMIN — LABETALOL HYDROCHLORIDE 400 MG: 100 TABLET, FILM COATED ORAL at 05:02

## 2018-02-27 RX ADMIN — TACROLIMUS 5 MG: 1 CAPSULE ORAL at 08:02

## 2018-02-27 RX ADMIN — HEPARIN SODIUM 5000 UNITS: 5000 INJECTION, SOLUTION INTRAVENOUS; SUBCUTANEOUS at 03:02

## 2018-02-27 RX ADMIN — TACROLIMUS 5 MG: 1 CAPSULE ORAL at 05:02

## 2018-02-27 RX ADMIN — NIFEDIPINE 60 MG: 30 TABLET, FILM COATED, EXTENDED RELEASE ORAL at 08:02

## 2018-02-27 NOTE — CONSULTS
Renal Progress Note    Date of Admission: 2/23/2018 10:18 PM      Review of Systems: Pte still having grand mal seizures    Physical Exam:    Vitals:    02/27/18 0029 02/27/18 0446 02/27/18 0732 02/27/18 0759   BP: 127/69 137/75 (!) 156/91    BP Location: Right arm Right arm Right arm    Patient Position: Lying Lying Lying    Pulse: 95 92 88    Resp: 18 18 18    Temp: 98.4 °F (36.9 °C) 98.1 °F (36.7 °C) 98.4 °F (36.9 °C)    TempSrc: Oral Oral Oral    SpO2: 100% 100% 100% 99%   Weight:       Height:         I/O last 3 completed shifts:  In: 370 [P.O.:120; Other:250]  Out: 766 [Other:766]  No intake/output data recorded.      Constitutional: She appears well-developed and well-nourished. No distress.   HENT:   Head: Normocephalic  Neck: supple.   Cardiovascular: Normal rate, regular rhythm and normal heart sounds.   Pulmonary: Breath sounds normal.  Abdominal: normal.   Musculoskeletal: no edema   + thrill to AV fistula on left forearm   Neurological: She is obtunded at present    Laboratories:      Recent Labs  Lab 02/27/18  0429   WBC 2.69*   RBC 2.73*   HGB 6.7*   HCT 20.7*   PLT 73*   MCV 76*   MCH 24.5*   MCHC 32.4       Recent Labs  Lab 02/27/18  0428   CALCIUM 8.1*   PROT 6.3      K 4.5   CO2 26      BUN 46*   CREATININE 10.8*   ALKPHOS 536*   ALT 37   AST 34   BILITOT 0.4     Phosphorus         6.1        5.3  Phosphorus     Magnesium          2.2 2.1 1.8     1.8  Magnesium     Diagnostic Tests: n/a    Assessment:    28 y/o female with Hx. Malignant HTN, ESRD on HD and Hx. Liver transplant with chronic rejection  admitted with:    - Breaktrough seizures  - Worsening Anemia of CKD  - Controlled hyperphosphatemia      Plan:    - Neurology yet to see pte. And make adjustments to anti-seizure meds. ( they were informed yesterday after Pte. Had seizure during dialysis) but so far no comments since last progress note on 2/24  - Dialysis Q MWF  - Transfuse during next  HD  - Epogen w/ HD  - BP control  - Renal diet  - Neurology following

## 2018-02-27 NOTE — ASSESSMENT & PLAN NOTE
Likely 2/2 hypertensive crisis. No evidence of stroke per exam. MRI wo contrast obtained prior to admission reports chronic findings on right frontal lobe concerning for vasculitis vs gliosis. No fever, AMS or gross neuro deficits. No purpura on exam. Contrast with MRI contraindicated as she is ESRD. Will obtain CTA of brain tomorrow to further evaluate vessels. HD after CTA. Keppra 500 PO BID for now. Neurology on board for co-management.  She had another seizure yesterday,CTA head is unremarkable,neuro is following.

## 2018-02-27 NOTE — PROGRESS NOTES
[facility] Hospital Medicine  Progress Note    Patient Name: Holly Patel  MRN: [id number]  Patient Class: IP- Inpatient   Admission Date: 2/23/2018  Length of Stay: 4 days  Attending Physician: Farheen Tellez MD  Primary Care Provider: Stan Sosa MD        Subjective:     Principal Problem:Hypertensive emergency    HPI:  Holly Patel is a 27 y.o. female that (in part)  has a past medical history of Anemia in ESRD (end-stage renal disease); Chronic rejection of liver transplant; Encounter for blood transfusion; ESRD on hemodialysis; History of splenomegaly; Immunosuppressed; Iron deficiency anemia secondary to inadequate dietary iron intake; Liver replaced by transplant; Moderate protein-calorie malnutrition; MRSA bacteremia; Prophylactic immunotherapy; Renovascular hypertension; Secondary hyperparathyroidism; and Thrombocytopenia. Presents to [facility] as a transfer patient from the [facility] Emergency Department for evaluation of hypertensive emergency.  She was admitted recently for hypertensive emergency.  She complained of shortness of breath , but denied chest pain, abdominal pain, nausea, or vomiting.  Associated with a Nonproductive cough.  She had dialysis earlier today without issue.  She does report dyspnea worsened with exertion and minimally relieved with rest and supplemental oxygen given in the ED.  Located in the lungs.  Constant duration.  Recurring frequency.  Moderate severity.    In the emergency department routine laboratory studies, EKG, cardiac enzymes were obtained.  There is concern for new development of anterior lateral T-wave inversions.  Repeat EKG even after treatment for hypertensive emergency demonstrated the EKG changes.   Hospital medicine has been asked to admit for further evaluation and treatment as a transfer patient.  Shortly after arrival the patient had a seizure that lasted less than 1 minute.   There is questionable compliance with her home seizure medication regimen.   She was given a Keppra loading dose and has not had any seizures since the initial event.    Hospital Course:  Ms Patel presented with hypertensive emergency as evidenced by elevated troponin and tonic clonic seizure event. Admitted to ICU for nicardipine infusion. Trop mildly elevated to 0.08 without STEMI on EKG. Had a chest pain not typical for angina. Trop likely demand. Weaned off nicardipine infusion on 2/24 and transitioned to PO meds. Seizure controlled after one dose of keppra 1000 mg IV. Had this same issue on a recent admission for the same issues. Patient is reported to be non compliant. Neurology consulted on last admission. Planned for MRI as CT showed no acute abnormalities. MRI wo contrast showed chronic findings in right frontal lobe concerning for vasculitis vs nonspecific areas of gliosis or related to migraine syndromes. Has no fever, headache or AMS. Contrast for MRI cannot be given as she is ESRD, therefore CTA of brain ordered to further evaluate vessels. Stepped down to telemetry floor on 2/25,she has been again consulted compliance with medication,she does no know name of medication or dosage of medication.  She had another seizure yesterday,CTA head is unremarkable,neuro is following.    Interval History: BP  better. Feels well. Has no new complaints. No seizures. No chest pain    Review of Systems   Constitutional: Negative.    Respiratory: Negative.    Cardiovascular: Negative.    Gastrointestinal: Negative.    Musculoskeletal: Negative.    Neurological: Negative.    Psychiatric/Behavioral: Negative.      Objective:     Vital Signs (Most Recent):  Temp: 97.9 °F (36.6 °C) (02/25/18 0754)  Pulse: 82 (02/25/18 1030)  Resp: 18 (02/25/18 1030)  BP: (!) 160/100 (02/25/18 1002)  SpO2: 100 % (02/25/18 1030) Vital Signs (24h Range):  Temp:  [97.9 °F (36.6 °C)-98.6 °F (37 °C)] 98.4 °F (36.9 °C)  Pulse:  [81-95] 88  Resp:   [18-20] 18  SpO2:  [95 %-100 %] 100 %  BP: (127-185)/() 156/91     Weight: 53 kg (116 lb 13.5 oz)  Body mass index is 19.44 kg/m².    Intake/Output Summary (Last 24 hours) at 02/27/18 0858  Last data filed at 02/26/18 1941   Gross per 24 hour   Intake              370 ml   Output              766 ml   Net             -396 ml      Physical Exam   Constitutional: She is oriented to person, place, and time. She appears well-developed. No distress.   Eyes: EOM are normal. Pupils are equal, round, and reactive to light.   Cardiovascular: Normal rate, regular rhythm and intact distal pulses.    Murmur heard.  Pulmonary/Chest: Effort normal and breath sounds normal. She has no wheezes. She has no rales.   Abdominal: Soft. Bowel sounds are normal.   Musculoskeletal: Normal range of motion. She exhibits no edema.   Neurological: She is alert and oriented to person, place, and time. She displays normal reflexes. No cranial nerve deficit. She exhibits normal muscle tone.   Skin: Skin is warm and dry. She is not diaphoretic.   Psychiatric: She has a normal mood and affect. Her behavior is normal. Judgment and thought content normal.   Nursing note and vitals reviewed.      Significant Labs: All pertinent labs within the past 24 hours have been reviewed.    Significant Imaging: I have reviewed all pertinent imaging results/findings within the past 24 hours.  I have reviewed and interpreted all pertinent imaging results/findings within the past 24 hours.    Assessment/Plan:      * Hypertensive emergency    Although patient denies non compliance, there are numerous reports reporting medication non compliance. Weaned off nicardipine infusion and now on PO therapy for goal < 150/90 mmHg.  US to r/oed out  CHIDI,,she has been again consulted compliance with medication,she does no know name of medication or dosage of medication.          Seizure in response to acute event    Likely 2/2 hypertensive crisis. No evidence of stroke  per exam. MRI wo contrast obtained prior to admission reports chronic findings on right frontal lobe concerning for vasculitis vs gliosis. No fever, AMS or gross neuro deficits. No purpura on exam. Contrast with MRI contraindicated as she is ESRD. Will obtain CTA of brain tomorrow to further evaluate vessels. HD after CTA. Keppra 500 PO BID for now. Neurology on board for co-management.  She had another seizure yesterday,CTA head is unremarkable,neuro is following.          Elevated troponin    Likely 2/2 demand. Doubt ACS. On secondary prevention except for statin as I do not know if this is safe for a liver allograft although function is stable.           Uncontrolled hypertension    As above          Secondary hyperparathyroidism    No acute issues. Continue current regimen          Immunosuppressed    2/2 prednisone and tacrolimus for liver allograft. No acute issues. Tacro levels daily          Non compliance w medication regimen    Stress importance of medical compliance. Mother at bedside during conversation. Verbalized understanding.           Thrombocytopenia    Chronic. Is on ASA. No acute issues          Anemia in ESRD (end-stage renal disease)    Stable, consistent with previous baseline levels. 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 Function stable. On tacrolimus and prednisone            VTE Risk Mitigation         Ordered     heparin (porcine) injection 5,000 Units  Every 8 hours     Route:  Subcutaneous        02/23/18 2334     Medium Risk of VTE  Once      02/23/18 9107              Farheen Tellez MD  Department of Hospital Medicine   [facility]

## 2018-02-27 NOTE — SUBJECTIVE & OBJECTIVE
Interval History: BP  better. Feels well. Has no new complaints. No seizures. No chest pain    Review of Systems   Constitutional: Negative.    Respiratory: Negative.    Cardiovascular: Negative.    Gastrointestinal: Negative.    Musculoskeletal: Negative.    Neurological: Negative.    Psychiatric/Behavioral: Negative.      Objective:     Vital Signs (Most Recent):  Temp: 97.9 °F (36.6 °C) (02/25/18 0754)  Pulse: 82 (02/25/18 1030)  Resp: 18 (02/25/18 1030)  BP: (!) 160/100 (02/25/18 1002)  SpO2: 100 % (02/25/18 1030) Vital Signs (24h Range):  Temp:  [97.9 °F (36.6 °C)-98.6 °F (37 °C)] 98.4 °F (36.9 °C)  Pulse:  [81-95] 88  Resp:  [18-20] 18  SpO2:  [95 %-100 %] 100 %  BP: (127-185)/() 156/91     Weight: 53 kg (116 lb 13.5 oz)  Body mass index is 19.44 kg/m².    Intake/Output Summary (Last 24 hours) at 02/27/18 0858  Last data filed at 02/26/18 1941   Gross per 24 hour   Intake              370 ml   Output              766 ml   Net             -396 ml      Physical Exam   Constitutional: She is oriented to person, place, and time. She appears well-developed. No distress.   Eyes: EOM are normal. Pupils are equal, round, and reactive to light.   Cardiovascular: Normal rate, regular rhythm and intact distal pulses.    Murmur heard.  Pulmonary/Chest: Effort normal and breath sounds normal. She has no wheezes. She has no rales.   Abdominal: Soft. Bowel sounds are normal.   Musculoskeletal: Normal range of motion. She exhibits no edema.   Neurological: She is alert and oriented to person, place, and time. She displays normal reflexes. No cranial nerve deficit. She exhibits normal muscle tone.   Skin: Skin is warm and dry. She is not diaphoretic.   Psychiatric: She has a normal mood and affect. Her behavior is normal. Judgment and thought content normal.   Nursing note and vitals reviewed.      Significant Labs: All pertinent labs within the past 24 hours have been reviewed.    Significant Imaging: I have reviewed  all pertinent imaging results/findings within the past 24 hours.  I have reviewed and interpreted all pertinent imaging results/findings within the past 24 hours.

## 2018-02-27 NOTE — PLAN OF CARE
Problem: Patient Care Overview  Goal: Plan of Care Review  Outcome: Ongoing (interventions implemented as appropriate)   02/26/18 1800   Coping/Psychosocial   Plan Of Care Reviewed With patient;father   Awake alert and oriented. Denies pain N/V. No witness or complaint of seizures after returning to room after HD. Tolerating po intake, hourly rounds, free of falls, call light in reach, instructed to call for assistance if needed. Verbalized understanding, No distress noted. Father at bedside.

## 2018-02-27 NOTE — PLAN OF CARE
Problem: Patient Care Overview  Goal: Plan of Care Review  Outcome: Ongoing (interventions implemented as appropriate)   02/27/18 1657   Coping/Psychosocial   Plan Of Care Reviewed With patient       Comments: Resting at present. No further witnessed seizure activity since earlier this shift. Hydralazine effective for elevated blood pressure. Moves self ad russ in bed. Gets out of bed independantly with family in attendance. Ambulates safely.

## 2018-02-27 NOTE — PLAN OF CARE
Problem: Hemodialysis (Adult)  Goal: Signs and Symptoms of Listed Potential Problems Will be Absent, Minimized or Managed (Hemodialysis)  Signs and symptoms of listed potential problems will be absent, minimized or managed by discharge/transition of care (reference Hemodialysis (Adult) CPG).   Outcome: Ongoing (interventions implemented as appropriate)   02/26/18 1100   Hemodialysis   Problems Assessed (Hemodialysis) all   Problems Present (Hemodialysis) cardiovascular complications;electrolyte imbalance;fluid imbalance;hematologic complications;neurologic complications;situational response   3.5 hour hd tx in progress

## 2018-02-28 LAB
ALBUMIN SERPL BCP-MCNC: 2.5 G/DL
ALP SERPL-CCNC: 503 U/L
ALT SERPL W/O P-5'-P-CCNC: 29 U/L
ANION GAP SERPL CALC-SCNC: 12 MMOL/L
AST SERPL-CCNC: 27 U/L
BASOPHILS # BLD AUTO: 0.01 K/UL
BASOPHILS NFR BLD: 0.4 %
BILIRUB SERPL-MCNC: 0.4 MG/DL
BLD PROD TYP BPU: NORMAL
BLD PROD TYP BPU: NORMAL
BLOOD UNIT EXPIRATION DATE: NORMAL
BLOOD UNIT EXPIRATION DATE: NORMAL
BLOOD UNIT TYPE CODE: 7300
BLOOD UNIT TYPE CODE: 7300
BLOOD UNIT TYPE: NORMAL
BLOOD UNIT TYPE: NORMAL
BUN SERPL-MCNC: 56 MG/DL
CALCIUM SERPL-MCNC: 8.2 MG/DL
CHLORIDE SERPL-SCNC: 103 MMOL/L
CO2 SERPL-SCNC: 23 MMOL/L
CODING SYSTEM: NORMAL
CODING SYSTEM: NORMAL
CREAT SERPL-MCNC: 12.6 MG/DL
DIFFERENTIAL METHOD: ABNORMAL
DISPENSE STATUS: NORMAL
DISPENSE STATUS: NORMAL
EOSINOPHIL # BLD AUTO: 0.5 K/UL
EOSINOPHIL NFR BLD: 17.3 %
ERYTHROCYTE [DISTWIDTH] IN BLOOD BY AUTOMATED COUNT: 17.1 %
EST. GFR  (AFRICAN AMERICAN): 4 ML/MIN/1.73 M^2
EST. GFR  (NON AFRICAN AMERICAN): 4 ML/MIN/1.73 M^2
GLUCOSE SERPL-MCNC: 94 MG/DL
HCT VFR BLD AUTO: 19.7 %
HGB BLD-MCNC: 6.6 G/DL
LYMPHOCYTES # BLD AUTO: 0.6 K/UL
LYMPHOCYTES NFR BLD: 22.1 %
MAGNESIUM SERPL-MCNC: 1.9 MG/DL
MCH RBC QN AUTO: 25.3 PG
MCHC RBC AUTO-ENTMCNC: 33.5 G/DL
MCV RBC AUTO: 76 FL
MONOCYTES # BLD AUTO: 0.2 K/UL
MONOCYTES NFR BLD: 6.6 %
NEUTROPHILS # BLD AUTO: 1.5 K/UL
NEUTROPHILS NFR BLD: 53.6 %
NUM UNITS TRANS PACKED RBC: NORMAL
PHOSPHATE SERPL-MCNC: 6.8 MG/DL
PLATELET # BLD AUTO: 73 K/UL
PMV BLD AUTO: 9.3 FL
POTASSIUM SERPL-SCNC: 4.7 MMOL/L
PROT SERPL-MCNC: 6.2 G/DL
RBC # BLD AUTO: 2.61 M/UL
SODIUM SERPL-SCNC: 138 MMOL/L
TRANS ERYTHROCYTES VOL PATIENT: NORMAL ML
WBC # BLD AUTO: 2.71 K/UL

## 2018-02-28 PROCEDURE — 85025 COMPLETE CBC W/AUTO DIFF WBC: CPT

## 2018-02-28 PROCEDURE — P9021 RED BLOOD CELLS UNIT: HCPCS

## 2018-02-28 PROCEDURE — 25000003 PHARM REV CODE 250: Performed by: INTERNAL MEDICINE

## 2018-02-28 PROCEDURE — 36430 TRANSFUSION BLD/BLD COMPNT: CPT

## 2018-02-28 PROCEDURE — 36415 COLL VENOUS BLD VENIPUNCTURE: CPT

## 2018-02-28 PROCEDURE — 80197 ASSAY OF TACROLIMUS: CPT

## 2018-02-28 PROCEDURE — 94761 N-INVAS EAR/PLS OXIMETRY MLT: CPT

## 2018-02-28 PROCEDURE — 83735 ASSAY OF MAGNESIUM: CPT

## 2018-02-28 PROCEDURE — 25000003 PHARM REV CODE 250: Performed by: HOSPITALIST

## 2018-02-28 PROCEDURE — A4216 STERILE WATER/SALINE, 10 ML: HCPCS | Performed by: HOSPITALIST

## 2018-02-28 PROCEDURE — 12000002 HC ACUTE/MED SURGE SEMI-PRIVATE ROOM

## 2018-02-28 PROCEDURE — 63600175 PHARM REV CODE 636 W HCPCS: Performed by: HOSPITALIST

## 2018-02-28 PROCEDURE — 99232 SBSQ HOSP IP/OBS MODERATE 35: CPT | Mod: ,,, | Performed by: PSYCHIATRY & NEUROLOGY

## 2018-02-28 PROCEDURE — 27201040 HC RC 50 FILTER

## 2018-02-28 PROCEDURE — P9016 RBC LEUKOCYTES REDUCED: HCPCS

## 2018-02-28 PROCEDURE — 80177 DRUG SCRN QUAN LEVETIRACETAM: CPT

## 2018-02-28 PROCEDURE — 84100 ASSAY OF PHOSPHORUS: CPT

## 2018-02-28 PROCEDURE — 80053 COMPREHEN METABOLIC PANEL: CPT

## 2018-02-28 PROCEDURE — 80100016 HC MAINTENANCE HEMODIALYSIS

## 2018-02-28 PROCEDURE — 25000003 PHARM REV CODE 250: Performed by: PSYCHIATRY & NEUROLOGY

## 2018-02-28 RX ORDER — HYDROCODONE BITARTRATE AND ACETAMINOPHEN 500; 5 MG/1; MG/1
TABLET ORAL
Status: DISCONTINUED | OUTPATIENT
Start: 2018-02-28 | End: 2018-02-28

## 2018-02-28 RX ORDER — HYDROCODONE BITARTRATE AND ACETAMINOPHEN 500; 5 MG/1; MG/1
TABLET ORAL
Status: DISCONTINUED | OUTPATIENT
Start: 2018-02-28 | End: 2018-03-01 | Stop reason: HOSPADM

## 2018-02-28 RX ADMIN — PANTOPRAZOLE SODIUM 40 MG: 40 TABLET, DELAYED RELEASE ORAL at 08:02

## 2018-02-28 RX ADMIN — LACOSAMIDE 50 MG: 50 TABLET, FILM COATED ORAL at 08:02

## 2018-02-28 RX ADMIN — ASPIRIN 81 MG 81 MG: 81 TABLET ORAL at 08:02

## 2018-02-28 RX ADMIN — PREDNISONE 1 MG: 1 TABLET ORAL at 09:02

## 2018-02-28 RX ADMIN — LABETALOL HYDROCHLORIDE 400 MG: 100 TABLET, FILM COATED ORAL at 06:02

## 2018-02-28 RX ADMIN — LEVETIRACETAM 500 MG: 500 TABLET, FILM COATED ORAL at 08:02

## 2018-02-28 RX ADMIN — LABETALOL HYDROCHLORIDE 400 MG: 100 TABLET, FILM COATED ORAL at 03:02

## 2018-02-28 RX ADMIN — NIFEDIPINE 60 MG: 30 TABLET, FILM COATED, EXTENDED RELEASE ORAL at 08:02

## 2018-02-28 RX ADMIN — CINACALCET HYDROCHLORIDE 30 MG: 30 TABLET, COATED ORAL at 09:02

## 2018-02-28 RX ADMIN — HEPARIN SODIUM 5000 UNITS: 5000 INJECTION, SOLUTION INTRAVENOUS; SUBCUTANEOUS at 06:02

## 2018-02-28 RX ADMIN — TACROLIMUS 5 MG: 1 CAPSULE ORAL at 06:02

## 2018-02-28 RX ADMIN — LABETALOL HYDROCHLORIDE 400 MG: 100 TABLET, FILM COATED ORAL at 10:02

## 2018-02-28 RX ADMIN — SODIUM CHLORIDE, PRESERVATIVE FREE 3 ML: 5 INJECTION INTRAVENOUS at 06:02

## 2018-02-28 RX ADMIN — HEPARIN SODIUM 5000 UNITS: 5000 INJECTION, SOLUTION INTRAVENOUS; SUBCUTANEOUS at 10:02

## 2018-02-28 RX ADMIN — DOCUSATE SODIUM AND SENNOSIDES 1 TABLET: 8.6; 5 TABLET, FILM COATED ORAL at 09:02

## 2018-02-28 RX ADMIN — DIPHENHYDRAMINE HYDROCHLORIDE 25 MG: 25 CAPSULE ORAL at 06:02

## 2018-02-28 RX ADMIN — TACROLIMUS 5 MG: 1 CAPSULE ORAL at 08:02

## 2018-02-28 NOTE — PLAN OF CARE
Problem: Patient Care Overview  Goal: Plan of Care Review  Outcome: Ongoing (interventions implemented as appropriate)   02/27/18 1801   Coping/Psychosocial   Plan Of Care Reviewed With patient;father   Awake alert and oriented. Ambulating to the BR. Denies pain N/V, weakness, dizziness. No complaints of or witnessed seizures today. Tolerating po intake. Hourly rounds, call light in reach instructed to call for assistance if needed. Free of falls .  Family at bedside most all day. Continue with plan of care as ordered.

## 2018-02-28 NOTE — PROGRESS NOTES
[facility] [place]  Neurology  Progress Note    Patient Name: Holly Patel  MRN: [id number]  Admission Date: 2/23/2018  Hospital Length of Stay: 5 days  Code Status: Full Code   Attending Provider: Farheen Tellez MD  Primary Care Physician: Stan Sosa MD   Principal Problem:Hypertensive emergency    Subjective:     Interval History:  28 y/o female with medical Hx as listed below comes to ED for chest pain. Pt has been consulted for seizures. She was recently D/C from hospital for hypertensive crisis. Pt had several seizures during her stay at his facility. She has been on levetiracetam 500 mg twice daily. While waiting in ED pt had a seizure. No other episodes reported. Complains of generalized weakness but no numbness, visual or speech disturbances.     -2/27/18: Seizure reported in dialysis. Pt is alert ans responsive this morning. Providing Hx.    -2/28/18: No seizures. Ongoing HD.    Current Neurological Medications:     Current Facility-Administered Medications   Medication Dose Route Frequency Provider Last Rate Last Dose    0.9%  NaCl infusion (for blood administration)   Intravenous Q24H PRN Jos Morgan MD        acetaminophen tablet 650 mg  650 mg Oral Q8H PRN Philip Sargent MD   650 mg at 02/25/18 0733    aspirin chewable tablet 81 mg  81 mg Oral Daily Philip Sargent MD   81 mg at 02/28/18 0824    bisacodyl suppository 10 mg  10 mg Rectal Daily PRN Philip Sargent MD        cinacalcet tablet 30 mg  30 mg Oral Daily with breakfast Philip Sargent MD   30 mg at 02/28/18 0955    cloNIDine 0.3 mg/24 hr td ptwk 1 patch  1 patch Transdermal Q7 Days Farheen Tellez MD        diphenhydrAMINE capsule 25 mg  25 mg Oral Q6H PRN Kimberly Rubin MD   25 mg at 02/26/18 2110    heparin (porcine) injection 5,000 Units  5,000 Units Subcutaneous Q8H Philip Sargent MD   5,000 Units at 02/28/18 0610    hydrALAZINE injection 10 mg  10 mg  Intravenous Q8H PRN Marilu Pinedo MD   10 mg at 02/26/18 2341    labetalol tablet 400 mg  400 mg Oral Q8H Farheen Tellez MD   400 mg at 02/28/18 0610    lacosamide tablet 50 mg  50 mg Oral Q12H Magdy Harper MD   50 mg at 02/28/18 0825    levETIRAcetam tablet 500 mg  500 mg Oral BID Marilu Pinedo MD   500 mg at 02/28/18 0828    lorazepam injection 2 mg  2 mg Intravenous Q1H PRN Philip Sargent MD   2 mg at 02/26/18 1252    mineral oil enema 1 enema  1 enema Rectal Daily PRN Philip Sargent MD        NIFEdipine 24 hr tablet 60 mg  60 mg Oral Daily Marilu Pinedo MD   60 mg at 02/28/18 0824    ondansetron injection 8 mg  8 mg Intravenous Q8H PRN Philip Sargent MD        pantoprazole EC tablet 40 mg  40 mg Oral Daily Philip Sargent MD   40 mg at 02/28/18 0824    polyethylene glycol packet 17 g  17 g Oral Daily PRN Philip Sargent MD        predniSONE tablet 1 mg  1 mg Oral Every other day Philip Sargent MD   1 mg at 02/28/18 0956    promethazine suppository 25 mg  25 mg Rectal Q6H PRN Philip Sargent MD        ramelteon tablet 8 mg  8 mg Oral Nightly PRN Philip Sargent MD   8 mg at 02/25/18 0052    senna-docusate 8.6-50 mg per tablet 1 tablet  1 tablet Oral Daily Philip Sargent MD   1 tablet at 02/28/18 0900    sodium chloride 0.9% flush 3 mL  3 mL Intravenous Q8H Philip Sargent MD   3 mL at 02/28/18 0612    tacrolimus capsule 5 mg  5 mg Oral BID Philip Sargent MD   5 mg at 02/28/18 0825       Review of Systems   Constitutional: Negative for fever.   HENT: Negative for trouble swallowing.    Eyes: Negative for photophobia.   Respiratory: Negative for shortness of breath.    Cardiovascular: Negative for chest pain.   Gastrointestinal: Negative for abdominal pain.   Genitourinary: Negative for dysuria.   Musculoskeletal: Negative for back pain.   Neurological: Negative for headaches.   Psychiatric/Behavioral: Negative for  hallucinations.     Objective:     Vital Signs (Most Recent):  Temp: 98 °F (36.7 °C) (02/28/18 1145)  Pulse: 75 (02/28/18 1145)  Resp: 18 (02/28/18 1145)  BP: (!) 166/97 (02/28/18 1145)  SpO2: 99 % (02/28/18 0846) Vital Signs (24h Range):  Temp:  [97.8 °F (36.6 °C)-98.4 °F (36.9 °C)] 98 °F (36.7 °C)  Pulse:  [75-92] 75  Resp:  [16-18] 18  SpO2:  [94 %-100 %] 99 %  BP: (124-166)/(59-97) 166/97     Weight: 53 kg (116 lb 13.5 oz)  Body mass index is 19.44 kg/m².    Physical Exam  Constitutional: She is oriented to person, place, and time. No distress.   HENT:   Head: Normocephalic.   Eyes: Right eye exhibits no discharge. Left eye exhibits no discharge.   Neck: Normal range of motion.   Cardiovascular: Normal rate.    Pulmonary/Chest: Breath sounds normal.   Abdominal: Bowel sounds are normal.   Musculoskeletal: She exhibits no deformity.   Neurological: She is oriented to person, place, and time. She has normal strength.   Psychiatric: Her speech is normal.         NEUROLOGICAL EXAMINATION:      MENTAL STATUS   Oriented to person, place, and time.   Speech: speech is normal   Level of consciousness: alert     CRANIAL NERVES      CN III, IV, VI   Right pupil: Size: 2 mm. Shape: regular.   Left pupil: Size: 2 mm. Shape: regular.   Nystagmus: none   Ophthalmoparesis: none     CN XII   Tongue deviation: none     MOTOR EXAM      Strength   Strength 5/5 throughout.      GAIT AND COORDINATION      Tremor   Resting tremor: absent         Significant Labs:   CBC:   Recent Labs  Lab 02/27/18  0429 02/28/18  0528   WBC 2.69* 2.71*   HGB 6.7* 6.6*   HCT 20.7* 19.7*   PLT 73* 73*     CMP:   Recent Labs  Lab 02/27/18  0428 02/28/18  0528   GLU 92 94    138   K 4.5 4.7    103   CO2 26 23   BUN 46* 56*   CREATININE 10.8* 12.6*   CALCIUM 8.1* 8.2*   MG 1.8 1.9   PROT 6.3 6.2   ALBUMIN 2.5* 2.5*   BILITOT 0.4 0.4   ALKPHOS 536* 503*   AST 34 27   ALT 37 29   ANIONGAP 8 12   EGFRNONAA 4* 4*       Assessment and Plan:     27  y/o female consulted for seizures     1. Seizures: could be related to hypertension.           MRI of brain shows as a above no specific findings but given concern for vasculitis (see report) CTA of intracranial vessels was obtained. No abnormal findings on CTA. No AMS or fever.           -For now will continue levetiracetam 500 mg twice daily. Lacosamide 50 mg twice daily.         -Pt needs follow up in neurology clinic.         -Seizure restrictions are but not limited to: no driving for six months after last seizure; avoid swimming, high altitude activities, operating heavy machinery, bathing unattended, or engaging in activities in where a seizure will cause harm to self or others.    Active Diagnoses:    Diagnosis Date Noted POA    PRINCIPAL PROBLEM:  Hypertensive emergency [I16.1] 02/23/2018 Yes    Seizures [R56.9]  Yes    Elevated troponin [R74.8] 02/16/2018 Yes    Seizure in response to acute event [R56.9] 02/16/2018 Yes    Uncontrolled hypertension [I10] 01/24/2018 Yes    Immunosuppressed [D89.9] 08/05/2017 Yes     Chronic    Secondary hyperparathyroidism [N25.81] 08/05/2017 Yes    Non compliance w medication regimen [Z91.14] 04/13/2016 Not Applicable    Thrombocytopenia [D69.6] 04/12/2016 Yes    Anemia in ESRD (end-stage renal disease) [N18.6, D63.1] 10/12/2015 Yes     Chronic    ESRD on hemodialysis [N18.6, Z99.2] 09/30/2015 Not Applicable     Chronic   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 [Z94.4] 09/10/2012 Not Applicable     Chronic      Problems Resolved During this Admission:    Diagnosis Date Noted Date Resolved POA    Febrile illness [R50.9] 08/05/2017 08/06/2017 Yes       VTE Risk Mitigation         Ordered     heparin (porcine) injection 5,000 Units  Every 8 hours     Route:  Subcutaneous        02/23/18 0697     Medium Risk of VTE  Once      02/23/18 5619          Magdy Harper MD  Neurology  [facility]

## 2018-02-28 NOTE — PLAN OF CARE
Problem: Patient Care Overview  Goal: Plan of Care Review  Outcome: Ongoing (interventions implemented as appropriate)   02/28/18 1542   Coping/Psychosocial   Plan Of Care Reviewed With patient;mother   Resting quietly. No acute distress noted.    Problem: Hypertensive Disease/Crisis (Arterial) (Adult)  Goal: Signs and Symptoms of Listed Potential Problems Will be Absent, Minimized or Managed (Hypertensive Disease/Crisis)  Signs and symptoms of listed potential problems will be absent, minimized or managed by discharge/transition of care (reference Hypertensive Disease/Crisis (Arterial) (Adult) CPG).   Outcome: Ongoing (interventions implemented as appropriate)   02/28/18 1542   Hypertensive Disease/Crisis (Arterial)   Problems Assessed (Hypertensive Disease/Crisis (Arterial)) all   Problems Present (Hypertensive Disease/Crisis (Arterial)) renal dysfunction       Problem: Hemodialysis (Adult)  Goal: Signs and Symptoms of Listed Potential Problems Will be Absent, Minimized or Managed (Hemodialysis)  Signs and symptoms of listed potential problems will be absent, minimized or managed by discharge/transition of care (reference Hemodialysis (Adult) CPG).   Outcome: Ongoing (interventions implemented as appropriate)   02/28/18 1542   Hemodialysis   Problems Assessed (Hemodialysis) all   Problems Present (Hemodialysis) electrolyte imbalance;neurologic complications;situational response   Hemodialysis tolerated well. 3 Liters removed per hemodialysis    Problem: Seizure Disorder/Epilepsy (Adult)  Goal: Signs and Symptoms of Listed Potential Problems Will be Absent, Minimized or Managed (Seizure Disorder/Epilepsy)  Signs and symptoms of listed potential problems will be absent, minimized or managed by discharge/transition of care (reference Seizure Disorder/Epilepsy (Adult) CPG).   Outcome: Ongoing (interventions implemented as appropriate)   02/28/18 1542   Seizure Disorder/Epilepsy   Problems Assessed (Seizure  Disorder/Epilepsy) all   Problems Present (Seizure Disorder/Epilepsy) none       Problem: Fall Risk (Adult)  Goal: Identify Related Risk Factors and Signs and Symptoms  Related risk factors and signs and symptoms are identified upon initiation of Human Response Clinical Practice Guideline (CPG)   Outcome: Ongoing (interventions implemented as appropriate)   02/28/18 1542   Fall Risk   Related Risk Factors (Fall Risk) polypharmacy   Signs and Symptoms (Fall Risk) presence of risk factors

## 2018-02-28 NOTE — PLAN OF CARE
Problem: Patient Care Overview  Goal: Plan of Care Review  Outcome: Ongoing (interventions implemented as appropriate)   02/28/18 5749   Coping/Psychosocial   Plan Of Care Reviewed With patient       Comments: Resting peacefully at present. No seizure activity so far this shift. New med vimpat continues. Pt safely up ad russ. Family in attendance. B/P WNL.

## 2018-02-28 NOTE — NURSING
Report received and patient care assumed.See flow sheet for complete assessment.Telemetry box and monitor is same number [id number]. Checked box and monitor with off going nurse.

## 2018-02-28 NOTE — PROGRESS NOTES
[facility] Hospital Medicine  Progress Note    Patient Name: Holly Paetl  MRN: [id number]  Patient Class: IP- Inpatient   Admission Date: 2/23/2018  Length of Stay: 5 days  Attending Physician: Farheen Tellez MD  Primary Care Provider: Stan Sosa MD        Subjective:     Principal Problem:Hypertensive emergency    HPI:  Holly Patel is a 27 y.o. female that (in part)  has a past medical history of Anemia in ESRD (end-stage renal disease); Chronic rejection of liver transplant; Encounter for blood transfusion; ESRD on hemodialysis; History of splenomegaly; Immunosuppressed; Iron deficiency anemia secondary to inadequate dietary iron intake; Liver replaced by transplant; Moderate protein-calorie malnutrition; MRSA bacteremia; Prophylactic immunotherapy; Renovascular hypertension; Secondary hyperparathyroidism; and Thrombocytopenia. Presents to [facility] as a transfer patient from the [facility] Emergency Department for evaluation of hypertensive emergency.  She was admitted recently for hypertensive emergency.  She complained of shortness of breath , but denied chest pain, abdominal pain, nausea, or vomiting.  Associated with a Nonproductive cough.  She had dialysis earlier today without issue.  She does report dyspnea worsened with exertion and minimally relieved with rest and supplemental oxygen given in the ED.  Located in the lungs.  Constant duration.  Recurring frequency.  Moderate severity.    In the emergency department routine laboratory studies, EKG, cardiac enzymes were obtained.  There is concern for new development of anterior lateral T-wave inversions.  Repeat EKG even after treatment for hypertensive emergency demonstrated the EKG changes.   Hospital medicine has been asked to admit for further evaluation and treatment as a transfer patient.  Shortly after arrival the patient had a seizure that lasted less than 1 minute.   There is questionable compliance with her home seizure medication regimen.   She was given a Keppra loading dose and has not had any seizures since the initial event.    Hospital Course:  Ms Patel presented with hypertensive emergency as evidenced by elevated troponin and tonic clonic seizure event. Admitted to ICU for nicardipine infusion. Trop mildly elevated to 0.08 without STEMI on EKG. Had a chest pain not typical for angina. Trop likely demand. Weaned off nicardipine infusion on 2/24 and transitioned to PO meds. Seizure controlled after one dose of keppra 1000 mg IV. Had this same issue on a recent admission for the same issues. Patient is reported to be non compliant. Neurology consulted on last admission. Planned for MRI as CT showed no acute abnormalities. MRI wo contrast showed chronic findings in right frontal lobe concerning for vasculitis vs nonspecific areas of gliosis or related to migraine syndromes. Has no fever, headache or AMS. Contrast for MRI cannot be given as she is ESRD, therefore CTA of brain ordered to further evaluate vessels. Stepped down to telemetry floor on 2/25,she has been again consulted compliance with medication,she does no know name of medication or dosage of medication.will have family meeting today.  She had another seizure ,CTA head is unremarkable,neuro is following.Vimat has been added,will continue monitor.    Interval History: BP  better. Feels well. Has no new complaints. No seizures over night,    Review of Systems   Constitutional: Negative.    Respiratory: Negative.    Cardiovascular: Negative.    Gastrointestinal: Negative.    Musculoskeletal: Negative.    Neurological: Negative.    Psychiatric/Behavioral: Negative.      Objective:     Vital Signs (Most Recent):  Temp: 97.9 °F (36.6 °C) (02/25/18 0754)  Pulse: 82 (02/25/18 1030)  Resp: 18 (02/25/18 1030)  BP: (!) 160/100 (02/25/18 1002)  SpO2: 100 % (02/25/18 1030) Vital Signs (24h Range):  Temp:  [97.9 °F (36.6  °C)-98.7 °F (37.1 °C)] 97.9 °F (36.6 °C)  Pulse:  [83-92] 86  Resp:  [18] 18  SpO2:  [94 %-100 %] 99 %  BP: (124-148)/(59-93) 148/93     Weight: 53 kg (116 lb 13.5 oz)  Body mass index is 19.44 kg/m².    Intake/Output Summary (Last 24 hours) at 02/28/18 0855  Last data filed at 02/27/18 1400   Gross per 24 hour   Intake              240 ml   Output                0 ml   Net              240 ml      Physical Exam   Constitutional: She is oriented to person, place, and time. She appears well-developed. No distress.   Eyes: EOM are normal. Pupils are equal, round, and reactive to light.   Cardiovascular: Normal rate, regular rhythm and intact distal pulses.    Murmur heard.  Pulmonary/Chest: Effort normal and breath sounds normal. She has no wheezes. She has no rales.   Abdominal: Soft. Bowel sounds are normal.   Musculoskeletal: Normal range of motion. She exhibits no edema.   Neurological: She is alert and oriented to person, place, and time. She displays normal reflexes. No cranial nerve deficit. She exhibits normal muscle tone.   Skin: Skin is warm and dry. She is not diaphoretic.   Psychiatric: She has a normal mood and affect. Her behavior is normal. Judgment and thought content normal.   Nursing note and vitals reviewed.      Significant Labs: All pertinent labs within the past 24 hours have been reviewed.    Significant Imaging: I have reviewed all pertinent imaging results/findings within the past 24 hours.  I have reviewed and interpreted all pertinent imaging results/findings within the past 24 hours.    Assessment/Plan:      * Hypertensive emergency    Although patient denies non compliance, there are numerous reports reporting medication non compliance. Weaned off nicardipine infusion and now on PO therapy for goal < 150/90 mmHg.  US to r/oed out  CHIDI,,she has been again consulted compliance with medication,she does no know name of medication or dosage of medication.          Seizure in response to acute  event    Likely 2/2 hypertensive crisis. No evidence of stroke per exam. MRI wo contrast obtained prior to admission reports chronic findings on right frontal lobe concerning for vasculitis vs gliosis. No fever, AMS or gross neuro deficits. No purpura on exam. Contrast with MRI contraindicated as she is ESRD. Will obtain CTA of brain tomorrow to further evaluate vessels. HD after CTA. Keppra 500 PO BID for now. Neurology on board for co-management.  She had another seizure ,CTA head is unremarkable,neuro is following.Vimpat has been added,will continue monitor.          Elevated troponin    Likely 2/2 demand. Doubt ACS. On secondary prevention except for statin as I do not know if this is safe for a liver allograft although function is stable.           Uncontrolled hypertension    As above          Secondary hyperparathyroidism    No acute issues. Continue current regimen          Immunosuppressed    2/2 prednisone and tacrolimus for liver allograft. No acute issues. Tacro levels daily          Non compliance w medication regimen    Stress importance of medical compliance. Mother at bedside during conversation. Verbalized understanding.           Thrombocytopenia    Chronic. Is on ASA. No acute issues          Anemia in ESRD (end-stage renal disease)     EPO with HD,may salomón PRBC with HD.          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 Function stable. On tacrolimus and prednisone            VTE Risk Mitigation         Ordered     heparin (porcine) injection 5,000 Units  Every 8 hours     Route:  Subcutaneous        02/23/18 5819     Medium Risk of VTE  Once      02/23/18 2198              Farheen Tellez MD  Department of Hospital Medicine   [facility]

## 2018-02-28 NOTE — SUBJECTIVE & OBJECTIVE
Interval History: BP  better. Feels well. Has no new complaints. No seizures over night,    Review of Systems   Constitutional: Negative.    Respiratory: Negative.    Cardiovascular: Negative.    Gastrointestinal: Negative.    Musculoskeletal: Negative.    Neurological: Negative.    Psychiatric/Behavioral: Negative.      Objective:     Vital Signs (Most Recent):  Temp: 97.9 °F (36.6 °C) (02/25/18 0754)  Pulse: 82 (02/25/18 1030)  Resp: 18 (02/25/18 1030)  BP: (!) 160/100 (02/25/18 1002)  SpO2: 100 % (02/25/18 1030) Vital Signs (24h Range):  Temp:  [97.9 °F (36.6 °C)-98.7 °F (37.1 °C)] 97.9 °F (36.6 °C)  Pulse:  [83-92] 86  Resp:  [18] 18  SpO2:  [94 %-100 %] 99 %  BP: (124-148)/(59-93) 148/93     Weight: 53 kg (116 lb 13.5 oz)  Body mass index is 19.44 kg/m².    Intake/Output Summary (Last 24 hours) at 02/28/18 0855  Last data filed at 02/27/18 1400   Gross per 24 hour   Intake              240 ml   Output                0 ml   Net              240 ml      Physical Exam   Constitutional: She is oriented to person, place, and time. She appears well-developed. No distress.   Eyes: EOM are normal. Pupils are equal, round, and reactive to light.   Cardiovascular: Normal rate, regular rhythm and intact distal pulses.    Murmur heard.  Pulmonary/Chest: Effort normal and breath sounds normal. She has no wheezes. She has no rales.   Abdominal: Soft. Bowel sounds are normal.   Musculoskeletal: Normal range of motion. She exhibits no edema.   Neurological: She is alert and oriented to person, place, and time. She displays normal reflexes. No cranial nerve deficit. She exhibits normal muscle tone.   Skin: Skin is warm and dry. She is not diaphoretic.   Psychiatric: She has a normal mood and affect. Her behavior is normal. Judgment and thought content normal.   Nursing note and vitals reviewed.      Significant Labs: All pertinent labs within the past 24 hours have been reviewed.    Significant Imaging: I have reviewed all  pertinent imaging results/findings within the past 24 hours.  I have reviewed and interpreted all pertinent imaging results/findings within the past 24 hours.

## 2018-02-28 NOTE — CONSULTS
Renal Progress Note    Date of Admission: 2/23/2018 10:18 PM      Review of Systems: No seizures so far    Physical Exam:    Vitals:    02/28/18 1200 02/28/18 1230 02/28/18 1300 02/28/18 1330   BP: (!) 164/98 (!) 147/88 (!) 158/99 (!) 175/102   BP Location:       Patient Position:       Pulse: 77 77 77 78   Resp:    18   Temp:    98 °F (36.7 °C)   TempSrc:    Oral   SpO2:       Weight:       Height:         I/O last 3 completed shifts:  In: 480 [P.O.:480]  Out: -   I/O this shift:  In: 626 [Blood:626]  Out: -     Seen during Dialysis, no distress, awake and alert    Laboratories:      Recent Labs  Lab 02/28/18  0528   WBC 2.71*   RBC 2.61*   HGB 6.6*   HCT 19.7*   PLT 73*   MCV 76*   MCH 25.3*   MCHC 33.5       Recent Labs  Lab 02/28/18  0528   CALCIUM 8.2*   PROT 6.2      K 4.7   CO2 23      BUN 56*   CREATININE 12.6*   ALKPHOS 503*   ALT 29   AST 27   BILITOT 0.4     Phosphorus         6.1        5.3  Phosphorus     Magnesium          2.2 2.1 1.8     1.8  Magnesium     Diagnostic Tests: n/a    Assessment:    26 y/o female with Hx. Malignant HTN, ESRD on HD and Hx. Liver transplant with chronic rejection  admitted with:    - Breaktrough seizures  - Worsening Anemia of CKD  - Controlled hyperphosphatemia      Plan:  - Dialysis in progress and Q MWF  - Transfused 2u PRBCs during HD today  - Epogen w/ HD  - f/u H&H  - BP control  - Renal diet  - Anti-seizure Rx. Per Neurology

## 2018-02-28 NOTE — PROGRESS NOTES
Appts scheduled with Primary Care. OFELIA also tried to schedule appointment for Neurology. OFELIA told by RN that the next available appointment will be on the 29th of March but if she needed to follow up sooner then MD will need to call MD Reji in order to get an earlier appointment. OFELIA voiced understanding. Pt also placed on waiting list for Neuro if an appointment becomes available.

## 2018-02-28 NOTE — PLAN OF CARE
(Home HD pt says, 5 times per week for 2 hours.Affiliated with [facility] [address])     02/28/18 1207   Discharge Reassessment   Assessment Type Discharge Planning Reassessment   Provided patient/caregiver education on the expected discharge date and the discharge plan Yes   Do you have any problems affording any of your prescribed medications? No   Discharge Plan A Home with family;Home Health  ([facility])   Discharge Plan B Home with family   Can the patient answer the patient profile reliably? Yes, cognitively intact   How does the patient rate their overall health at the present time? Fair   Describe the patient's ability to walk at the present time. Minor restrictions or changes   How often would a person be available to care for the patient? Occasionally   Number of comorbid conditions (as recorded on the chart) Four   During the past month, has the patient often been bothered by feeling down, depressed or hopeless? No   During the past month, has the patient often been bothered by little interest or pleasure in doing things? No     [id number] - [place], [place] - [address]  [address]  [place] [address]  Phone: [phone] Fax: [phone]    [facility] - [place], [place] - [address] AT [facility]  [address]  [place] [address]  Phone: [phone] Fax: [phone]    [facility] - [place] - [address]  [address]  [place] [address]  Phone: [phone] Fax: [phone]

## 2018-02-28 NOTE — ASSESSMENT & PLAN NOTE
Likely 2/2 hypertensive crisis. No evidence of stroke per exam. MRI wo contrast obtained prior to admission reports chronic findings on right frontal lobe concerning for vasculitis vs gliosis. No fever, AMS or gross neuro deficits. No purpura on exam. Contrast with MRI contraindicated as she is ESRD. Will obtain CTA of brain tomorrow to further evaluate vessels. HD after CTA. Keppra 500 PO BID for now. Neurology on board for co-management.  She had another seizure ,CTA head is unremarkable,neuro is following.Vimpat has been added,will continue monitor.

## 2018-03-01 VITALS
RESPIRATION RATE: 18 BRPM | OXYGEN SATURATION: 100 % | WEIGHT: 117.44 LBS | SYSTOLIC BLOOD PRESSURE: 163 MMHG | TEMPERATURE: 99 F | HEIGHT: 65 IN | DIASTOLIC BLOOD PRESSURE: 102 MMHG | HEART RATE: 84 BPM | BODY MASS INDEX: 19.57 KG/M2

## 2018-03-01 LAB
BASOPHILS # BLD AUTO: 0.01 K/UL
BASOPHILS NFR BLD: 0.2 %
DIFFERENTIAL METHOD: ABNORMAL
EOSINOPHIL # BLD AUTO: 0.6 K/UL
EOSINOPHIL NFR BLD: 14 %
ERYTHROCYTE [DISTWIDTH] IN BLOOD BY AUTOMATED COUNT: 17.9 %
HCT VFR BLD AUTO: 26.9 %
HGB BLD-MCNC: 9 G/DL
LEVETIRACETAM SERPL-MCNC: 43.2 UG/ML (ref 3–60)
LYMPHOCYTES # BLD AUTO: 0.6 K/UL
LYMPHOCYTES NFR BLD: 14 %
MCH RBC QN AUTO: 25.1 PG
MCHC RBC AUTO-ENTMCNC: 33.5 G/DL
MCV RBC AUTO: 75 FL
MONOCYTES # BLD AUTO: 0.3 K/UL
MONOCYTES NFR BLD: 7.2 %
NEUTROPHILS # BLD AUTO: 2.9 K/UL
NEUTROPHILS NFR BLD: 64.6 %
PLATELET # BLD AUTO: 80 K/UL
PMV BLD AUTO: 9.2 FL
RBC # BLD AUTO: 3.58 M/UL
TACROLIMUS BLD-MCNC: 2.6 NG/ML
WBC # BLD AUTO: 4.44 K/UL

## 2018-03-01 PROCEDURE — 85025 COMPLETE CBC W/AUTO DIFF WBC: CPT

## 2018-03-01 PROCEDURE — 25000003 PHARM REV CODE 250: Performed by: PSYCHIATRY & NEUROLOGY

## 2018-03-01 PROCEDURE — 25000003 PHARM REV CODE 250: Performed by: INTERNAL MEDICINE

## 2018-03-01 PROCEDURE — 99232 SBSQ HOSP IP/OBS MODERATE 35: CPT | Mod: ,,, | Performed by: PSYCHIATRY & NEUROLOGY

## 2018-03-01 PROCEDURE — 36415 COLL VENOUS BLD VENIPUNCTURE: CPT

## 2018-03-01 PROCEDURE — 25000003 PHARM REV CODE 250: Performed by: HOSPITALIST

## 2018-03-01 PROCEDURE — 63600175 PHARM REV CODE 636 W HCPCS: Performed by: HOSPITALIST

## 2018-03-01 RX ORDER — LEVETIRACETAM 500 MG/1
500 TABLET ORAL 2 TIMES DAILY
Qty: 60 TABLET | Refills: 0 | Status: SHIPPED | OUTPATIENT
Start: 2018-03-01 | End: 2018-03-01

## 2018-03-01 RX ORDER — NIFEDIPINE 60 MG/1
60 TABLET, EXTENDED RELEASE ORAL DAILY
Qty: 30 TABLET | Refills: 0 | Status: SHIPPED | OUTPATIENT
Start: 2018-03-01 | End: 2018-03-01

## 2018-03-01 RX ORDER — LACOSAMIDE 50 MG/1
50 TABLET ORAL EVERY 12 HOURS
Qty: 60 TABLET | Refills: 0 | Status: SHIPPED | OUTPATIENT
Start: 2018-03-01 | End: 2018-05-18

## 2018-03-01 RX ORDER — HYDRALAZINE HYDROCHLORIDE 25 MG/1
25 TABLET, FILM COATED ORAL EVERY 8 HOURS
Status: DISCONTINUED | OUTPATIENT
Start: 2018-03-01 | End: 2018-03-01 | Stop reason: HOSPADM

## 2018-03-01 RX ORDER — LABETALOL 200 MG/1
400 TABLET, FILM COATED ORAL EVERY 8 HOURS
Qty: 180 TABLET | Refills: 0 | Status: SHIPPED | OUTPATIENT
Start: 2018-03-01 | End: 2018-03-05 | Stop reason: SDUPTHER

## 2018-03-01 RX ORDER — HYDRALAZINE HYDROCHLORIDE 25 MG/1
25 TABLET, FILM COATED ORAL EVERY 8 HOURS
Qty: 90 TABLET | Refills: 0 | Status: SHIPPED | OUTPATIENT
Start: 2018-03-01 | End: 2018-03-14 | Stop reason: SDUPTHER

## 2018-03-01 RX ORDER — LEVETIRACETAM 500 MG/1
500 TABLET ORAL 2 TIMES DAILY
Qty: 60 TABLET | Refills: 0 | Status: SHIPPED | OUTPATIENT
Start: 2018-03-01 | End: 2018-05-18

## 2018-03-01 RX ORDER — NIFEDIPINE 60 MG/1
60 TABLET, EXTENDED RELEASE ORAL DAILY
Qty: 30 TABLET | Refills: 0 | Status: SHIPPED | OUTPATIENT
Start: 2018-03-01 | End: 2018-03-31

## 2018-03-01 RX ORDER — CLONIDINE 0.3 MG/24H
1 PATCH, EXTENDED RELEASE TRANSDERMAL
Status: DISCONTINUED | OUTPATIENT
Start: 2018-03-01 | End: 2018-03-01 | Stop reason: HOSPADM

## 2018-03-01 RX ORDER — LACOSAMIDE 50 MG/1
50 TABLET ORAL EVERY 12 HOURS
Qty: 60 TABLET | Refills: 0 | Status: SHIPPED | OUTPATIENT
Start: 2018-03-01 | End: 2018-03-01

## 2018-03-01 RX ORDER — LABETALOL 200 MG/1
400 TABLET, FILM COATED ORAL EVERY 8 HOURS
Qty: 180 TABLET | Refills: 0 | Status: SHIPPED | OUTPATIENT
Start: 2018-03-01 | End: 2018-03-14 | Stop reason: SDUPTHER

## 2018-03-01 RX ORDER — HYDRALAZINE HYDROCHLORIDE 25 MG/1
25 TABLET, FILM COATED ORAL EVERY 8 HOURS
Qty: 90 TABLET | Refills: 0 | Status: SHIPPED | OUTPATIENT
Start: 2018-03-01 | End: 2018-03-01

## 2018-03-01 RX ADMIN — NIFEDIPINE 60 MG: 30 TABLET, FILM COATED, EXTENDED RELEASE ORAL at 08:03

## 2018-03-01 RX ADMIN — ASPIRIN 81 MG 81 MG: 81 TABLET ORAL at 08:03

## 2018-03-01 RX ADMIN — HEPARIN SODIUM 5000 UNITS: 5000 INJECTION, SOLUTION INTRAVENOUS; SUBCUTANEOUS at 05:03

## 2018-03-01 RX ADMIN — HYDRALAZINE HYDROCHLORIDE 25 MG: 25 TABLET ORAL at 08:03

## 2018-03-01 RX ADMIN — CLONIDINE 1 PATCH: 0.3 PATCH TRANSDERMAL at 09:03

## 2018-03-01 RX ADMIN — TACROLIMUS 5 MG: 1 CAPSULE ORAL at 08:03

## 2018-03-01 RX ADMIN — PANTOPRAZOLE SODIUM 40 MG: 40 TABLET, DELAYED RELEASE ORAL at 08:03

## 2018-03-01 RX ADMIN — LACOSAMIDE 50 MG: 50 TABLET, FILM COATED ORAL at 08:03

## 2018-03-01 RX ADMIN — DOCUSATE SODIUM AND SENNOSIDES 1 TABLET: 8.6; 5 TABLET, FILM COATED ORAL at 08:03

## 2018-03-01 RX ADMIN — LABETALOL HYDROCHLORIDE 400 MG: 100 TABLET, FILM COATED ORAL at 05:03

## 2018-03-01 RX ADMIN — LEVETIRACETAM 500 MG: 500 TABLET, FILM COATED ORAL at 08:03

## 2018-03-01 NOTE — PLAN OF CARE
Problem: Patient Care Overview  Goal: Plan of Care Review  Outcome: Ongoing (interventions implemented as appropriate)  Patient received dialysis today 3 liters of fluid taken off. No pressure injuries observed. No falls, trauma or injury this shift. No seizure activity observed. Continue with plan of care and continue to monitor patient.

## 2018-03-01 NOTE — DISCHARGE SUMMARY
[facility]  Hospital Medicine  Discharge Summary      Patient Name: Holly Patel  MRN: [id number]  Admission Date: 2/23/2018  Hospital Length of Stay: 6 days  Discharge Date and Time:  03/01/2018 12:05 PM  Attending Physician: Farheen Tellez MD   Discharging Provider: Fahreen Tellez MD  Primary Care Provider: Stan Sosa MD      HPI:   Holly Patel is a 27 y.o. female that (in part)  has a past medical history of Anemia in ESRD (end-stage renal disease); Chronic rejection of liver transplant; Encounter for blood transfusion; ESRD on hemodialysis; History of splenomegaly; Immunosuppressed; Iron deficiency anemia secondary to inadequate dietary iron intake; Liver replaced by transplant; Moderate protein-calorie malnutrition; MRSA bacteremia; Prophylactic immunotherapy; Renovascular hypertension; Secondary hyperparathyroidism; and Thrombocytopenia. Presents to [facility] as a transfer patient from the [facility] Emergency Department for evaluation of hypertensive emergency.  She was admitted recently for hypertensive emergency.  She complained of shortness of breath , but denied chest pain, abdominal pain, nausea, or vomiting.  Associated with a Nonproductive cough.  She had dialysis earlier today without issue.  She does report dyspnea worsened with exertion and minimally relieved with rest and supplemental oxygen given in the ED.  Located in the lungs.  Constant duration.  Recurring frequency.  Moderate severity.    In the emergency department routine laboratory studies, EKG, cardiac enzymes were obtained.  There is concern for new development of anterior lateral T-wave inversions.  Repeat EKG even after treatment for hypertensive emergency demonstrated the EKG changes.   Hospital medicine has been asked to admit for further evaluation and treatment as a transfer patient.  Shortly after arrival the patient had a seizure that lasted  less than 1 minute.  There is questionable compliance with her home seizure medication regimen.   She was given a Keppra loading dose and has not had any seizures since the initial event.    * No surgery found *      Hospital Course:   Ms Patel presented with hypertensive emergency as evidenced by elevated troponin and tonic clonic seizure event. Admitted to ICU for nicardipine infusion. Trop mildly elevated to 0.08 without STEMI on EKG. Had a chest pain not typical for angina. Trop likely demand. Weaned off nicardipine infusion on 2/24 and transitioned to PO meds. Seizure better controlled after one dose of keppra 1000 mg IV. Had this same issue on a recent admission for the same issues. Patient is reported to be non compliant. Neurology consulted on last admission. Planned for MRI as CT showed no acute abnormalities. MRI wo contrast showed chronic findings in right frontal lobe concerning for vasculitis vs nonspecific areas of gliosis or related to migraine syndromes. Has no fever, headache or AMS. Contrast for MRI cannot be given as she is ESRD, therefore CTA of brain was ordered,which showed no acute process. Stepped down to telemetry floor on 2/25,she has been again consulted compliance with medication,she does no know name of medication or dosage of medication.had  family meeting  And all medication with dosage has been explained again,  She had couple more seizure activity on floor,neurlogy started also on Vimapt,added to Keprra,she remains stable in last 2-3 days with stable VS,she had also routine HD with PRBC with HD duo to anemia in ESRD with improvement in H/H.  Patient has been discharged home with follow up plan with PCP and neurology,out patient neurology follow up by  has been arranged.       Consults:   Consults         Status Ordering Provider     Inpatient consult to Cardiology  Once     Provider:  Smooth Olsen MD    Completed SHAMA QUEEN     Inpatient consult to  Nephrology  Once     Provider:  Summer Rosado MD    Acknowledged SHAMA QUEEN     Inpatient consult to Neurology  Once     Provider:  Magdy Harper MD    Completed GRIS SANTOS     Inpatient consult to Social Work  Once     Provider:  (Not yet assigned)    OLGA LIDIA Chilel          No new Assessment & Plan notes have been filed under this hospital service since the last note was generated.  Service: Hospital Medicine    Final Active Diagnoses:    Diagnosis Date Noted POA    PRINCIPAL PROBLEM:  Hypertensive emergency [I16.1] 02/23/2018 Yes    Seizures [R56.9]  Yes    Elevated troponin [R74.8] 02/16/2018 Yes    Seizure in response to acute event [R56.9] 02/16/2018 Yes    Uncontrolled hypertension [I10] 01/24/2018 Yes    Immunosuppressed [D89.9] 08/05/2017 Yes     Chronic    Secondary hyperparathyroidism [N25.81] 08/05/2017 Yes    Non compliance w medication regimen [Z91.14] 04/13/2016 Not Applicable    Thrombocytopenia [D69.6] 04/12/2016 Yes    Anemia in ESRD (end-stage renal disease) [N18.6, D63.1] 10/12/2015 Yes     Chronic    ESRD on hemodialysis [N18.6, Z99.2] 09/30/2015 Not Applicable     Chronic   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 [Z94.4] 09/10/2012 Not Applicable     Chronic      Problems Resolved During this Admission:    Diagnosis Date Noted Date Resolved POA    Febrile illness [R50.9] 08/05/2017 08/06/2017 Yes       Discharged Condition: stable    Disposition: Home-Health Care [facility]    Follow Up:  Follow-up Information     Michael Mendoza MD On 3/29/2018.    Specialty:  Neurology  Why:  Thursday at 8:40am. Pt is on Waiting list for first available appointment. Per EDMOND Garcia. Neuro MD will call office to place you on schedule sooner.   Contact information:  [address] [place], [place] [id number] [phone]             Stan Sosa MD On 3/8/2018.    Specialty:  Internal Medicine  Why:  Thursday at 10:20am. Hospital Follow Up appointment.  Contact  information:  [address]  [facility] [address]  [phone]                 Patient Instructions:     Activity as tolerated         Significant Diagnostic Studies: Labs:   BMP:   Recent Labs  Lab 02/28/18 0528   GLU 94      K 4.7      CO2 23   BUN 56*   CREATININE 12.6*   CALCIUM 8.2*   MG 1.9    and CBC   Recent Labs  Lab 02/28/18 0528 03/01/18  0445   WBC 2.71* 4.44   HGB 6.6* 9.0*   HCT 19.7* 26.9*   PLT 73* 80*       Pending Diagnostic Studies:     Procedure Component Value Units Date/Time    Levetiracetam level [[id number]] Collected:  02/28/18 0528    Order Status:  Sent Lab Status:  In process Updated:  02/28/18 0642    Specimen:  Blood from Blood          Medications:  Reconciled Home Medications:   Current Discharge Medication List      START taking these medications    Details   lacosamide (VIMPAT) 50 mg Tab Take 1 tablet (50 mg total) by mouth every 12 (twelve) hours.  Qty: 60 tablet, Refills: 0      levETIRAcetam (KEPPRA) 500 MG Tab Take 1 tablet (500 mg total) by mouth 2 (two) times daily.  Qty: 60 tablet, Refills: 0         CONTINUE these medications which have CHANGED    Details   hydrALAZINE (APRESOLINE) 25 MG tablet Take 1 tablet (25 mg total) by mouth every 8 (eight) hours.  Qty: 90 tablet, Refills: 0      !! labetalol (NORMODYNE) 200 MG tablet Take 2 tablets (400 mg total) by mouth every 8 (eight) hours.  Qty: 180 tablet, Refills: 0      !! labetalol (NORMODYNE) 200 MG tablet Take 2 tablets (400 mg total) by mouth every 8 (eight) hours.  Qty: 180 tablet, Refills: 0      NIFEdipine (PROCARDIA-XL) 60 MG (OSM) 24 hr tablet Take 1 tablet (60 mg total) by mouth once daily.  Qty: 30 tablet, Refills: 0       !! - Potential duplicate medications found. Please discuss with provider.      CONTINUE these medications which have NOT CHANGED    Details   aspirin 81 MG Chew Take 1 tablet (81 mg total) by mouth once daily.  Refills: 0      cinacalcet (SENSIPAR) 30 MG Tab Take 1 tablet (30 mg  total) by mouth daily with breakfast.  Qty: 30 tablet, Refills: 11      cloNIDine 0.2 mg/24 hr td ptwk (CATAPRES) 0.2 mg/24 hr Place 1 patch onto the skin every 7 days. Change every Friday  Qty: 4 patch, Refills: 11      famotidine (PEPCID) 20 MG tablet Take 1 tablet (20 mg total) by mouth 2 (two) times daily.  Qty: 20 tablet, Refills: 0      food supplemt, lactose-reduced (ENSURE ACTIVE HIGH PROTEIN) Liqd Take 236 mLs by mouth 2 (two) times daily.  Qty: 60 Can, Refills: 6    Associated Diagnoses: Liver replaced by transplant; ESRD on hemodialysis; Iron deficiency anemia secondary to inadequate dietary iron intake; Moderate protein-calorie malnutrition      furosemide (LASIX) 20 MG tablet Take 5 tablets (100 mg total) by mouth 2 (two) times daily.  Qty: 300 tablet, Refills: 11      ondansetron (ZOFRAN) 4 MG tablet Take 1 tablet (4 mg total) by mouth every 6 (six) hours.  Qty: 12 tablet, Refills: 0      oxyCODONE (ROXICODONE) 5 MG immediate release tablet Take 1 tablet (5 mg total) by mouth every 4 (four) hours as needed for Pain.  Qty: 12 tablet, Refills: 0      predniSONE (DELTASONE) 1 MG tablet Take 1 mg by mouth every other day.      tacrolimus (PROGRAF) 1 MG Cap Take 5 capsules (5 mg total) by mouth every 12 (twelve) hours.  Qty: 300 capsule, Refills: 11    Associated Diagnoses: Liver transplanted      triamcinolone acetonide 0.1% (KENALOG) 0.1 % ointment AAA on arms, legs, and neck bid x 1-2 wks then prn flares only  Qty: 80 g, Refills: 3    Associated Diagnoses: Atopic dermatitis             Indwelling Lines/Drains at time of discharge:   Lines/Drains/Airways     Drain                 Hemodialysis AV Fistula Left forearm -- days                Time spent on the discharge of patient: 30  minutes  Patient was seen and examined on the date of discharge and determined to be suitable for discharge.         Farheen Tellez MD  Department of Hospital Medicine  [facility]

## 2018-03-01 NOTE — PROGRESS NOTES
[place]: Lacosamide 50 mg Take  one tablet Orally twice daily. #60. To [facility] [phone]/phone [phone]..Bonnie Mckeon RN, BSN, STN [facility]  2/28/2018    3/1/2018 [place] faxed copy of patient's [place] card(front and  Back) to [facility]..Bonnie Mckeon RN, BSN, STN [facility]  3/1/2018

## 2018-03-01 NOTE — PLAN OF CARE
03/01/18 1212   Final Note   Assessment Type Final Discharge Note   Discharge Disposition Home   What phone number can be called within the next 1-3 days to see how you are doing after discharge? (see chart)   Hospital Follow Up  Appt(s) scheduled? Yes   Discharge plans and expectations educations in teach back method with documentation complete? Yes   Discharge/Hospital Encounter Summary to (non-[facility]) PCP No   Referral to / orders for Home Health Complete? Yes   Did you assess the readiness or willingness of the family or caregiver to support self management of care? Yes   Right Care Referral Info   Post Acute Recommendation Home-care

## 2018-03-01 NOTE — PLAN OF CARE
[facility]    HOME HEALTH ORDERS  FACE TO FACE ENCOUNTER    Patient Name: Holly Patel  YOB: 1990    PCP: Stan Sosa MD   PCP Address: [address] [place] [address]  PCP Phone Number: [phone]  PCP Fax: [phone]    Encounter Date: 03/01/2018    Admit to Home Health    Diagnoses:  Active Hospital Problems    Diagnosis  POA    *Hypertensive emergency [I16.1]  Yes    Seizures [R56.9]  Yes    Elevated troponin [R74.8]  Yes    Seizure in response to acute event [R56.9]  Yes    Uncontrolled hypertension [I10]  Yes    Immunosuppressed [D89.9]  Yes     Chronic    Secondary hyperparathyroidism [N25.81]  Yes    Non compliance w medication regimen [Z91.14]  Not Applicable    Thrombocytopenia [D69.6]  Yes    Anemia in ESRD (end-stage renal disease) [N18.6, D63.1]  Yes     Chronic    ESRD on hemodialysis [N18.6, Z99.2]  Not Applicable     Chronic   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 [Z94.4]  Not Applicable     Chronic     hemangioendothelioma s/p LTx (1992)        Resolved Hospital Problems    Diagnosis Date Resolved POA    Febrile illness [R50.9] 08/06/2017 Yes       Future Appointments  Date Time Provider Department Center   3/7/2018 1:40 PM Stan Sosa MD [facility]   3/8/2018 10:20 AM Stan Sosa MD [facility] [place] [facility]   3/19/2018 8:30 AM LAB, [facility]   3/29/2018 8:40 AM Michael Mendoza MD [facility] NEURO [facility]     Follow-up Information     Michael Mendoza MD On 3/29/2018.    Specialty:  Neurology  Why:  Thursday at 8:40am. Pt is on Waiting list for first available appointment. Per EDMOND Garcia. Neuro MD will call office to place you on schedule sooner.   Contact information:  [address] [place] [address]  [place] [id number] [phone]             Stan Sosa MD On 3/8/2018.    Specialty:  Internal Medicine  Why:  Thursday at 10:20am. Hospital Follow Up appointment.  Contact information:  [address]  [facility] [address]  [phone]             [facility] On 3/2/2018.    Specialty:  Home Health Services  Why:  Home Health  Contact information:  [address]  [address]  [place] [id number] [phone]                     I have seen and examined this patient face to face today. My clinical findings that support the need for the home health skilled services and home bound status are the following:  Patient with medication mismanagement issues requiring home bound status as evidenced by  Poor understanding of medication regimen/dosage and Poor adherence to medication regimen/dosage.  Medical restrictions requiring assistance of another human to leave home due to  HTN emergency .    Allergies:  Review of patient's allergies indicates:   Allergen Reactions    Chloral hydrate      Other reaction(s): Hallucinations  Other reaction(s): Hives    Hydrocodone Other (See Comments)     Mental status changes       Diet: renal diet    Activities: activity as tolerated    Nursing:   SN to complete comprehensive assessment including routine vital signs. Instruct on disease process and s/s of complications to report to MD. Review/verify medication list sent home with the patient at time of discharge  and instruct patient/caregiver as needed. Frequency may be adjusted depending on start of care date.    Notify MD if SBP > 160 or < 90; DBP > 90 or < 50; HR > 120 or < 50; Temp > 101; Other:             MISCELLANEOUS CARE:  compliance with HTN medication     WOUND CARE ORDERS  n/a      Medications: Review discharge medications with patient and family and provide education.      Current Discharge Medication List      START taking these medications    Details   lacosamide (VIMPAT) 50 mg Tab Take 1 tablet (50 mg total) by mouth every 12 (twelve) hours.  Qty: 60 tablet, Refills: 0      levETIRAcetam (KEPPRA) 500 MG Tab Take 1 tablet (500 mg total) by mouth 2 (two) times daily.  Qty: 60 tablet, Refills: 0          CONTINUE these medications which have CHANGED    Details   hydrALAZINE (APRESOLINE) 25 MG tablet Take 1 tablet (25 mg total) by mouth every 8 (eight) hours.  Qty: 90 tablet, Refills: 0      !! labetalol (NORMODYNE) 200 MG tablet Take 2 tablets (400 mg total) by mouth every 8 (eight) hours.  Qty: 180 tablet, Refills: 0      !! labetalol (NORMODYNE) 200 MG tablet Take 2 tablets (400 mg total) by mouth every 8 (eight) hours.  Qty: 180 tablet, Refills: 0      NIFEdipine (PROCARDIA-XL) 60 MG (OSM) 24 hr tablet Take 1 tablet (60 mg total) by mouth once daily.  Qty: 30 tablet, Refills: 0       !! - Potential duplicate medications found. Please discuss with provider.      CONTINUE these medications which have NOT CHANGED    Details   aspirin 81 MG Chew Take 1 tablet (81 mg total) by mouth once daily.  Refills: 0      cinacalcet (SENSIPAR) 30 MG Tab Take 1 tablet (30 mg total) by mouth daily with breakfast.  Qty: 30 tablet, Refills: 11      cloNIDine 0.2 mg/24 hr td ptwk (CATAPRES) 0.2 mg/24 hr Place 1 patch onto the skin every 7 days. Change every Friday  Qty: 4 patch, Refills: 11      famotidine (PEPCID) 20 MG tablet Take 1 tablet (20 mg total) by mouth 2 (two) times daily.  Qty: 20 tablet, Refills: 0      food supplemt, lactose-reduced (ENSURE ACTIVE HIGH PROTEIN) Liqd Take 236 mLs by mouth 2 (two) times daily.  Qty: 60 Can, Refills: 6    Associated Diagnoses: Liver replaced by transplant; ESRD on hemodialysis; Iron deficiency anemia secondary to inadequate dietary iron intake; Moderate protein-calorie malnutrition      furosemide (LASIX) 20 MG tablet Take 5 tablets (100 mg total) by mouth 2 (two) times daily.  Qty: 300 tablet, Refills: 11      ondansetron (ZOFRAN) 4 MG tablet Take 1 tablet (4 mg total) by mouth every 6 (six) hours.  Qty: 12 tablet, Refills: 0      oxyCODONE (ROXICODONE) 5 MG immediate release tablet Take 1 tablet (5 mg total) by mouth every 4 (four) hours as needed for Pain.  Qty: 12 tablet,  Refills: 0      predniSONE (DELTASONE) 1 MG tablet Take 1 mg by mouth every other day.      tacrolimus (PROGRAF) 1 MG Cap Take 5 capsules (5 mg total) by mouth every 12 (twelve) hours.  Qty: 300 capsule, Refills: 11    Associated Diagnoses: Liver transplanted      triamcinolone acetonide 0.1% (KENALOG) 0.1 % ointment AAA on arms, legs, and neck bid x 1-2 wks then prn flares only  Qty: 80 g, Refills: 3    Associated Diagnoses: Atopic dermatitis             I certify that this patient is confined to her home and needs intermittent skilled nursing care.

## 2018-03-01 NOTE — PROGRESS NOTES
Cost of Lacosamide 50mg #60. Cost to pt is $8.25, pt was informed to  prescriptions from [facility] in [place]..Bonnie Mckeon RN, BSN, STN [facility]  3/1/2018

## 2018-03-01 NOTE — PLAN OF CARE
Problem: Patient Care Overview  Goal: Plan of Care Review  Outcome: Ongoing (interventions implemented as appropriate)   03/01/18 1680   Coping/Psychosocial   Plan Of Care Reviewed With patient;mother   Patient states that she feel better since blood transfusion. No further seizures activity noted. Discharge to home. Mother and children at side

## 2018-03-01 NOTE — PROGRESS NOTES
[facility]  Neurology  Progress Note    Patient Name: Holly Patel  MRN: [id number]  Admission Date: 2/23/2018  Hospital Length of Stay: 6 days  Code Status: Full Code   Attending Provider: Farheen Tellez MD  Primary Care Physician: Stan Sosa MD   Principal Problem:Hypertensive emergency    Subjective:     Interval History: 28 y/o female with medical Hx as listed below comes to ED for chest pain. Pt has been consulted for seizures. She was recently D/C from hospital for hypertensive crisis. Pt had several seizures during her stay at his facility. She has been on levetiracetam 500 mg twice daily. While waiting in ED pt had a seizure. No other episodes reported. Complains of generalized weakness but no numbness, visual or speech disturbances.     -2/27/18: Seizure reported in dialysis. Pt is alert ans responsive this morning. Providing Hx.     -2/28/18: No seizures. Ongoing HD.    -3/1/18: No seizures overnight.    Current Neurological Medications:     Current Facility-Administered Medications   Medication Dose Route Frequency Provider Last Rate Last Dose    0.9%  NaCl infusion (for blood administration)   Intravenous Q24H PRN Jos Morgan MD        acetaminophen tablet 650 mg  650 mg Oral Q8H PRN Philip Sargent MD   650 mg at 02/25/18 0733    aspirin chewable tablet 81 mg  81 mg Oral Daily Philip Sargent MD   81 mg at 03/01/18 0826    bisacodyl suppository 10 mg  10 mg Rectal Daily PRN Philip Sargent MD        cinacalcet tablet 30 mg  30 mg Oral Daily with breakfast Philip Sargent MD   30 mg at 02/28/18 0955    cloNIDine 0.3 mg/24 hr td ptwk 1 patch  1 patch Transdermal Q7 Days Farheen Tellez MD   1 patch at 03/01/18 0930    diphenhydrAMINE capsule 25 mg  25 mg Oral Q6H PRN Kimberly Rubin MD   25 mg at 02/28/18 1841    heparin (porcine) injection 5,000 Units  5,000 Units Subcutaneous Q8H Philip Sargent MD   5,000 Units at  03/01/18 0529    hydrALAZINE injection 10 mg  10 mg Intravenous Q8H PRN Marilu Pinedo MD   10 mg at 02/26/18 2341    hydrALAZINE tablet 25 mg  25 mg Oral Q8H Farheen Tellez MD   25 mg at 03/01/18 0826    labetalol tablet 400 mg  400 mg Oral Q8H Farheen Tellez MD   400 mg at 03/01/18 0526    lacosamide tablet 50 mg  50 mg Oral Q12H Magdy Harper MD   50 mg at 03/01/18 0826    levETIRAcetam tablet 500 mg  500 mg Oral BID Marilu Pinedo MD   500 mg at 03/01/18 0826    lorazepam injection 2 mg  2 mg Intravenous Q1H PRN Philip Sargent MD   2 mg at 02/26/18 1252    mineral oil enema 1 enema  1 enema Rectal Daily PRN Philip Sargent MD        NIFEdipine 24 hr tablet 60 mg  60 mg Oral Daily Marilu Pinedo MD   60 mg at 03/01/18 0826    ondansetron injection 8 mg  8 mg Intravenous Q8H PRN Philip Sargent MD        pantoprazole EC tablet 40 mg  40 mg Oral Daily Philip Sargent MD   40 mg at 03/01/18 0826    polyethylene glycol packet 17 g  17 g Oral Daily PRN Philip Sargent MD        predniSONE tablet 1 mg  1 mg Oral Every other day Philip Sargent MD   1 mg at 02/28/18 0956    promethazine suppository 25 mg  25 mg Rectal Q6H PRN Philip Sargent MD        ramelteon tablet 8 mg  8 mg Oral Nightly PRN Philip Sargent MD   8 mg at 02/25/18 0052    senna-docusate 8.6-50 mg per tablet 1 tablet  1 tablet Oral Daily Philip Sargent MD   1 tablet at 03/01/18 0826    sodium chloride 0.9% flush 3 mL  3 mL Intravenous Q8H Philip Sargent MD   3 mL at 02/28/18 0612    tacrolimus capsule 5 mg  5 mg Oral BID Philip Sargent MD   5 mg at 03/01/18 0825       Review of Systems   Constitutional: Negative for fever.   HENT: Negative for trouble swallowing.    Eyes: Negative for photophobia.   Respiratory: Negative for shortness of breath.    Cardiovascular: Negative for chest pain.   Gastrointestinal: Negative for abdominal pain.   Genitourinary: Negative  for dysuria.   Musculoskeletal: Negative for back pain.   Neurological: Negative for headaches.   Psychiatric/Behavioral: Negative for hallucinations.     Objective:     Vital Signs (Most Recent):  Temp: 98.7 °F (37.1 °C) (03/01/18 0735)  Pulse: 84 (03/01/18 0735)  Resp: 18 (03/01/18 0735)  BP: (!) 163/102 (03/01/18 0735)  SpO2: 100 % (03/01/18 0735) Vital Signs (24h Range):  Temp:  [97.8 °F (36.6 °C)-98.9 °F (37.2 °C)] 98.7 °F (37.1 °C)  Pulse:  [75-88] 84  Resp:  [16-18] 18  SpO2:  [99 %-100 %] 100 %  BP: (122-177)/() 163/102     Weight: 53.3 kg (117 lb 6.7 oz)  Body mass index is 19.54 kg/m².    Physical Exam  Constitutional: She is oriented to person, place, and time. No distress.   HENT:   Head: Normocephalic.   Eyes: Right eye exhibits no discharge. Left eye exhibits no discharge.   Neck: Normal range of motion.   Cardiovascular: Normal rate.    Pulmonary/Chest: Breath sounds normal.   Abdominal: Bowel sounds are normal.   Musculoskeletal: She exhibits no deformity.   Neurological: She is oriented to person, place, and time. She has normal strength.   Psychiatric: Her speech is normal.         NEUROLOGICAL EXAMINATION:      MENTAL STATUS   Oriented to person, place, and time.   Speech: speech is normal   Level of consciousness: alert     CRANIAL NERVES      CN III, IV, VI   Right pupil: Size: 2 mm. Shape: regular.   Left pupil: Size: 2 mm. Shape: regular.   Nystagmus: none   Ophthalmoparesis: none    CN XI  Trap/SCM: 5/5     CN XII   Tongue deviation: none     MOTOR EXAM      Strength   Strength 5/5 throughout.      GAIT AND COORDINATION    Normal pattern of gait  Tremor   Resting tremor: absent       Significant Labs:   CBC:   Recent Labs  Lab 02/28/18 0528 03/01/18  0445   WBC 2.71* 4.44   HGB 6.6* 9.0*   HCT 19.7* 26.9*   PLT 73* 80*     CMP:   Recent Labs  Lab 02/28/18 0528   GLU 94      K 4.7      CO2 23   BUN 56*   CREATININE 12.6*   CALCIUM 8.2*   MG 1.9   PROT 6.2   ALBUMIN 2.5*    BILITOT 0.4   ALKPHOS 503*   AST 27   ALT 29   ANIONGAP 12   EGFRNONAA 4*         Assessment and Plan:     26 y/o female consulted for seizures     1. Seizures: could be related to hypertension.           MRI of brain shows as a above no specific findings but given concern for vasculitis (see report) CTA of intracranial vessels was obtained. No abnormal findings on CTA. No AMS or fever.           -For now will continue levetiracetam 500 mg twice daily. Lacosamide 50 mg twice daily. Perhaps later levetiracetam can be D/C and continue pt in only one medication: lacosamide.         -Pt needs follow up in neurology clinic.         -Seizure restrictions are but not limited to: no driving for six months after last seizure; avoid swimming, high altitude activities, operating heavy machinery, bathing unattended, or engaging in activities in where a seizure will cause harm to self or others.   -OK to D/C home from neurology standpoint.    Active Diagnoses:    Diagnosis Date Noted POA    PRINCIPAL PROBLEM:  Hypertensive emergency [I16.1] 02/23/2018 Yes    Seizures [R56.9]  Yes    Elevated troponin [R74.8] 02/16/2018 Yes    Seizure in response to acute event [R56.9] 02/16/2018 Yes    Uncontrolled hypertension [I10] 01/24/2018 Yes    Immunosuppressed [D89.9] 08/05/2017 Yes     Chronic    Secondary hyperparathyroidism [N25.81] 08/05/2017 Yes    Non compliance w medication regimen [Z91.14] 04/13/2016 Not Applicable    Thrombocytopenia [D69.6] 04/12/2016 Yes    Anemia in ESRD (end-stage renal disease) [N18.6, D63.1] 10/12/2015 Yes     Chronic    ESRD on hemodialysis [N18.6, Z99.2] 09/30/2015 Not Applicable     Chronic   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 [Z94.4] 09/10/2012 Not Applicable     Chronic      Problems Resolved During this Admission:    Diagnosis Date Noted Date Resolved POA    Febrile illness [R50.9] 08/05/2017 08/06/2017 Yes       VTE Risk Mitigation         Ordered     heparin (porcine) injection 5,000  Units  Every 8 hours     Route:  Subcutaneous        02/23/18 2339     Medium Risk of VTE  Once      02/23/18 7915          Magdy Harper MD  Neurology  [facility]

## 2018-03-01 NOTE — PROGRESS NOTES
WRITTEN DISCHARGE INFORMATION:     Follow-up Information     Michael Mendoza MD On 3/29/2018.    Specialty:  Neurology  Why:  Thursday at 8:40am. Pt is on Waiting list for first available appointment. Per EDMOND Garcia. Neuro MD will call office to place you on schedule sooner.   Contact information:  [address]  [address]  [place] [address]  [phone]             Stan Sosa MD On 3/8/2018.    Specialty:  Internal Medicine  Why:  Thursday at 10:20am. Hospital Follow Up appointment.  Contact information:  [address]  [facility] [address]  [phone]             [facility] On 3/2/2018.    Specialty:  Home Health Services  Why:  Home Health  Contact information:  [address]  [address]  [facility] [address]  [phone]                 Things that YOU are responsible for to Manage Your Care At Home:  1. Getting your prescriptions filled.  2. Taking you medications as directed. DO NOT MISS ANY DOSES!  3. Going to your follow-up doctor appointments. This is important because it allows the doctor to monitor your progress and to determine if any changes need to be made to your treatment plan.                                                          Help at Home  After discharge for assistance [facility] Care Line 24/7 assistance  [phone]     Thank you for choosing [facility] for your care.  Please answer any calls you may receive from [facility] we want to continue to support you as you manage your healthcare needs.  Sincerely, Your [facility] Healthcare Manager is,  Bonnie Mckeon RN [facility] [phone]

## 2018-03-01 NOTE — PROGRESS NOTES
S&S for seizures taught with teach back..Bonnie Mckeon RN, BSN, STN [facility]  3/1/2018

## 2018-03-01 NOTE — PROGRESS NOTES
Dr. Dodge will renew home health orders/  Kuliccia with [facility] says pt is on there board to be seen..Bonnie Mckeon RN, BSN, STN [facility]  3/1/2018

## 2018-03-01 NOTE — PROGRESS NOTES
TN spoke with Dr. Familia Harper, neurologist will schedule and appt with Dr. Michael Mendoza for next week.Bonnie Mckeon RN, BSN, STN [facility]  3/1/2018

## 2018-03-01 NOTE — PROGRESS NOTES
Lacosamide 50 mg Take  one tablet Orally twice daily. #60. To [facility] [phone]/phone [phone]..Bonnie Mckeon RN, BSN, STN CCM  2/28/2018    TN called by to [facility] and received prescription info BIN [id number]; Group ; ID [id number].  TN was given phone number for Prior authorization [phone], TN spoke to Rosa.  TN address questions, pt failed on  Keppra, pt is on  Hemodialysis and has had liver transplant.  Rosa says she will ask for the PA to be expedited; which  Means 24 hours for a determination or less.  Reference number is [id number].  Determination will be faxed to [phone]..Bonnie Mckeon RN, BSN, STN CCM  3/1/2018

## 2018-03-02 ENCOUNTER — TELEPHONE (OUTPATIENT)
Dept: INTERNAL MEDICINE | Facility: CLINIC | Age: 28
End: 2018-03-02

## 2018-03-02 NOTE — PHYSICIAN QUERY
PT Name: Holly Patel  MR #: [id number]    Physician Query Form - Hematology Clarification      CDS/: Nan Francois               Contact information: [email or web address]     This form is a permanent document in the medical record.      Query Date: March 2, 2018    By submitting this query, we are merely seeking further clarification of documentation. Please utilize your independent clinical judgment when addressing the question(s) below.    The Medical record contains the following:   Indicators    Supporting Clinical Findings Location in Medical Record    Anemia, Thrombocytopenia, Neutropenia, Pancytopenia documented Anemia in ESRD     Thrombocytopenia      X H & H Hemoglobin  8.2-6.6-9.0  Hematocrit 24.8-19.7-26.9   Labs 2/24-3/1   X WBC WBC 3.45-2.69-2.71 Labs 2/24-3/1    Neutrophils      Granulocytes     X Platelets Platelets 82-73-80   Labs 2/24-3/1   X Transfusion(s) 2units PRBC      Treatments:      Other:       Provider, please specify diagnosis or diagnoses associated with above clinical findings.    [  ] Pancytopenia with aplastic anemia  [  ] Pancytopenia due to chemotherapy  [  ] Pancytopenia due to other drug  [  ] Pancytopenia due to myelodysplastic syndrome  [ x ] Pancytopenia  [  ] Other Hematological Diagnosis (please specify) ______________________________  [  ] Clinically Undetermined      Please document in your progress notes daily for the duration of treatment, until resolved, and include in your discharge summary.

## 2018-03-02 NOTE — TELEPHONE ENCOUNTER
Spoke with Herminio with [facility] he reports pt's blood pressure is still high after recent hospital discharge ( pt was discharged with b/p 160/110). Pt is asymptomatic. Pt is liver transplant and dialysis pt. I spoke with Duyen Inman RN, who suggested that Herminio call pt's hepatologist and nephrologist and report the findings. Spoke with Herminio, phone numbers to the departments provided.

## 2018-03-02 NOTE — TELEPHONE ENCOUNTER
----- Message from Laly Prasad sent at 3/2/2018  9:58 AM CST -----  Contact: /[facility]/[phone]  -179 over 110-120. No headache.

## 2018-03-05 ENCOUNTER — PATIENT OUTREACH (OUTPATIENT)
Dept: ADMINISTRATIVE | Facility: CLINIC | Age: 28
End: 2018-03-05

## 2018-03-05 NOTE — Clinical Note
Please forward this important TCC information to your provider in order to maximize the post discharge care delivery of this patient.  C3 nurse spoke with Holly Patel  for a TCC post hospital discharge follow up call. The patient has a scheduled HOSFU appointment with Stan Sosa MD on 3/8 @ 1020. Respectfully, Pippa Doty RN  [facility]   [email or web address]     Please do not reply to this message, as this inbox is not routinely monitored.

## 2018-03-05 NOTE — PATIENT INSTRUCTIONS
Discharge Instructions for High Blood Pressure (Hypertension)    You have been diagnosed with high blood pressure (also called hypertension). This means the force of blood against your artery walls is too strong. It also means your heart is working hard to move blood. High blood pressure usually has no symptoms, but over time, it can damage your heart, blood vessels, eyes, kidneys, and other organs. With help from your doctor, you can manage your blood pressure and protect your health.    Taking Medications    Learn to take your own blood pressure. Keep a record of your results. Ask your doctor which readings mean that you need medical attention.  Take your blood pressure medication exactly as directed. Dont skip doses. Missing doses can cause your blood pressure to get out of control.  Avoid medications that contain heart stimulants, including over-the-counter drugs. Check for warnings about high blood pressure on the label.  Check with your doctor before taking a decongestant. Some decongestants can worsen high blood pressure.    Lifestyle Changes    Maintain a healthy weight. Get help to lose any extra pounds.  Cut back on salt.  Limit canned, dried, packaged, and fast foods.  Dont add salt to your food at the table.  Season foods with herbs instead of salt when you cook.  Follow the DASH (Dietary Approaches to Stop Hypertension) eating plan. This plan recommends vegetables, fruits, whole gains, and other heart healthy foods.  Begin an exercise program. Ask your doctor how to get started. You can benefit from simple activities like walking or gardening.  Break the smoking habit. Enroll in a stop-smoking program to improve your chances of success. Ask your health care provider about programs and medications to help you stop smoking.  Limit drinks that contain caffeine (coffee, black or green tea, cola) to 2 per day.  Never take stimulants such as amphetamines or cocaine; these drugs can be deadly for someone  with high blood pressure.  Control your stress. Learn stress-management techniques.  Limit alcohol to no more than 1 drink a day for women and 2 drinks a day for men.    Follow-Up  Make a follow-up appointment as directed by our staff.    When to Call Your Doctor  Call your doctor immediately if you have any of the following:  Chest pain or shortness of breath (call 911)  Moderate to severe headache  Weakness in the muscles of your face, arms, or legs  Trouble speaking  Extreme drowsiness  Confusion  Fainting or dizziness  Pulsating or rushing sound in your ears  Unexplained nosebleed  Weakness, tingling, or numbness of your face, arms, or legs  Change in vision  Blood pressure measured at home that is greater than 180/110    © 1487-9019 [facility], [address] [place] [address], [place] [address]. All rights reserved. This information is not intended as a substitute for professional medical care. Always follow your healthcare professional's instructions.

## 2018-03-07 ENCOUNTER — TELEPHONE (OUTPATIENT)
Dept: NEPHROLOGY | Facility: CLINIC | Age: 28
End: 2018-03-07

## 2018-03-07 RX ORDER — CLONIDINE 0.2 MG/24H
1 PATCH, EXTENDED RELEASE TRANSDERMAL
Qty: 4 PATCH | Refills: 11 | Status: SHIPPED | OUTPATIENT
Start: 2018-03-07 | End: 2018-03-14 | Stop reason: SDUPTHER

## 2018-03-07 NOTE — TELEPHONE ENCOUNTER
----- Message from Viktor Bass MD sent at 3/7/2018  4:09 PM CST -----  Contact: [facility]   Please call [facility], called in new Rx for patch to place today, thank you.     ----- Message -----  From: Lolly Manriquez  Sent: 3/7/2018   1:58 PM  To: Sheila BRUCE Staff    BP     200/130        [facility]    Pt since 2/25  Pt been putting the cover to clonidine patch on and not the medicine        Rashad  Feels it will come down when the actual med is put on   -       Rashad  Has 0.1 and put on pt right now       Order was for  0.2       Rashad has another 0.1 but didn't want to put  Since pt hasnt had it all this time       Pt doesn't have any more patches        Please call  [phone] -         Thanks     Ms. Paul (dialysis nurse ) already has taken care of this

## 2018-03-09 ENCOUNTER — TELEPHONE (OUTPATIENT)
Dept: NEPHROLOGY | Facility: CLINIC | Age: 28
End: 2018-03-09

## 2018-03-09 ENCOUNTER — TELEPHONE (OUTPATIENT)
Dept: INTERNAL MEDICINE | Facility: CLINIC | Age: 28
End: 2018-03-09

## 2018-03-09 NOTE — TELEPHONE ENCOUNTER
Hi, yes she can take benadryl. Please also offer her an urgent care appt or with me on 3/14 to better evaluate her hives,.  Thank you, Stan Sosa

## 2018-03-09 NOTE — TELEPHONE ENCOUNTER
----- Message from Ashlee Diaz sent at 3/9/2018  2:26 PM CST -----  Contact: Rashad, from [facility]  Rashad would like to speak with the nurse regarding pts current condition.  Pt is experiencing itching all over her body and an elevated blood pressure.      BP is 158/106    Rashad can be reached at [phone]    She asked that I send the message urgent    Beverly (dialysis nurse is taking care of this )

## 2018-03-09 NOTE — TELEPHONE ENCOUNTER
----- Message from Shae Portillo sent at 3/9/2018  2:49 PM CST -----  Contact: Rashad/[facility]/[phone] [facility] Nurse # /[phone] Pt's number  Pt is having some itching and it is different from her usual and needs to know if she can take some Benadryl for it. Please call and advise.      Thank You

## 2018-03-09 NOTE — TELEPHONE ENCOUNTER
----- Message from Balbina Feliciano sent at 3/9/2018  3:06 PM CST -----  Contact: Pt  [phone]  Patient  Wants to know , can she take benadryl while on other medication . call the pt back to verify     Already address by Beverly the dialysis nurse

## 2018-03-10 ENCOUNTER — HOSPITAL ENCOUNTER (EMERGENCY)
Facility: OTHER | Age: 28
Discharge: HOME | End: 2018-03-10
Attending: EMERGENCY MEDICINE
Payer: MEDICARE

## 2018-03-10 VITALS
HEART RATE: 56 BPM | OXYGEN SATURATION: 99 % | BODY MASS INDEX: 19.33 KG/M2 | TEMPERATURE: 98 F | WEIGHT: 116 LBS | DIASTOLIC BLOOD PRESSURE: 124 MMHG | HEIGHT: 65 IN | RESPIRATION RATE: 16 BRPM | SYSTOLIC BLOOD PRESSURE: 192 MMHG

## 2018-03-10 DIAGNOSIS — T78.40XA ALLERGIC REACTION, INITIAL ENCOUNTER: ICD-10-CM

## 2018-03-10 DIAGNOSIS — R21 RASH: Primary | ICD-10-CM

## 2018-03-10 PROCEDURE — 63600175 PHARM REV CODE 636 W HCPCS: Performed by: EMERGENCY MEDICINE

## 2018-03-10 PROCEDURE — 25000003 PHARM REV CODE 250: Performed by: EMERGENCY MEDICINE

## 2018-03-10 PROCEDURE — 99283 EMERGENCY DEPT VISIT LOW MDM: CPT | Mod: 25

## 2018-03-10 PROCEDURE — 96372 THER/PROPH/DIAG INJ SC/IM: CPT

## 2018-03-10 RX ORDER — FAMOTIDINE 20 MG/1
20 TABLET, FILM COATED ORAL 2 TIMES DAILY
Qty: 8 TABLET | Refills: 0 | Status: SHIPPED | OUTPATIENT
Start: 2018-03-10 | End: 2018-03-14 | Stop reason: SDUPTHER

## 2018-03-10 RX ORDER — PREDNISONE 20 MG/1
40 TABLET ORAL DAILY
Qty: 6 TABLET | Refills: 0 | Status: SHIPPED | OUTPATIENT
Start: 2018-03-10 | End: 2018-03-14

## 2018-03-10 RX ORDER — DIPHENHYDRAMINE HYDROCHLORIDE 50 MG/ML
50 INJECTION INTRAMUSCULAR; INTRAVENOUS
Status: COMPLETED | OUTPATIENT
Start: 2018-03-10 | End: 2018-03-10

## 2018-03-10 RX ORDER — FAMOTIDINE 20 MG/1
20 TABLET, FILM COATED ORAL
Status: COMPLETED | OUTPATIENT
Start: 2018-03-10 | End: 2018-03-10

## 2018-03-10 RX ORDER — METHYLPREDNISOLONE SOD SUCC 125 MG
125 VIAL (EA) INJECTION
Status: COMPLETED | OUTPATIENT
Start: 2018-03-10 | End: 2018-03-10

## 2018-03-10 RX ADMIN — DIPHENHYDRAMINE HYDROCHLORIDE 50 MG: 50 INJECTION, SOLUTION INTRAMUSCULAR; INTRAVENOUS at 07:03

## 2018-03-10 RX ADMIN — METHYLPREDNISOLONE SODIUM SUCCINATE 125 MG: 125 INJECTION, POWDER, FOR SOLUTION INTRAMUSCULAR; INTRAVENOUS at 07:03

## 2018-03-10 RX ADMIN — FAMOTIDINE 20 MG: 20 TABLET ORAL at 07:03

## 2018-03-10 NOTE — DISCHARGE INSTRUCTIONS
Take benadryl 25 mg every 6 hours for the next 24 hours then as needed after that. Return for any new or acute problems or concerns.

## 2018-03-10 NOTE — ED PROVIDER NOTES
"Encounter Date: 3/10/2018       History     Chief Complaint   Patient presents with    Allergic Reaction     Pt states," I started itching all over yesterday and today my face is swelling."     Ms Patel has hx of liver transplant, seizure d/o, takes meds as prescribed.reports onset of red itching rash yesterday, feels face swollen today. No trouble breathing or swallowing. No sore throat, cough, sob, no hx of same in past      The history is provided by the patient.   Rash    This is a new problem. The current episode started yesterday. The problem has been unchanged. The problem is associated with an unknown factor. Affected Location: arms, face, legs. Pain course: no pain. Associated symptoms include itching. Pertinent negatives include no blisters, no pain and no weeping. She has tried nothing for the symptoms.     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 ()    Moderate protein-calorie malnutrition 2017    MRSA bacteremia 2017    Prophylactic immunotherapy 2014    Renovascular hypertension 10/2/2015    Secondary hyperparathyroidism 2017    Thrombocytopenia 2016     Past Surgical History:   Procedure Laterality Date     SECTION      2     KIDNEY TRANSPLANT      LIVER BIOPSY      LIVER TRANSPLANT  1992 "    TUBAL LIGATION       Family History   Problem Relation Age of Onset    Hypertension Mother     Hypertension Father     Melanoma Neg Hx      Social History   Substance Use Topics    Smoking status: Never Smoker    Smokeless tobacco: Never Used    Alcohol use No     Review of Systems   Skin: Positive for itching and rash.   All other systems reviewed and are negative.      Physical Exam     Initial Vitals [03/10/18 0736]   BP Pulse Resp Temp SpO2   (!) 187/125 (!) 56 16 98.4 °F (36.9 °C) 99 %      MAP       145.67         Physical Exam    Nursing note and vitals reviewed.  Constitutional: She appears well-developed and well-nourished. She is not diaphoretic. No distress.   HENT:   Head: Normocephalic and atraumatic.   Mouth/Throat: Oropharynx is clear and moist. No oropharyngeal exudate.   Neck: Normal range of motion. Neck supple.   Cardiovascular: Normal rate, regular rhythm and normal heart sounds.   No murmur heard.  Pulmonary/Chest: Breath sounds normal. No respiratory distress. She has no wheezes. She has no rhonchi. She has no rales.   Abdominal: Soft. She exhibits no distension. There is no tenderness. There is no rebound.   Musculoskeletal: Normal range of motion. She exhibits no edema or tenderness.   Neurological: She is alert and oriented to person, place, and time. No cranial nerve deficit or sensory deficit.   Skin: Skin is warm and dry. Capillary refill takes less than 2 seconds. Rash noted. No erythema.   Scattered urticarial rash over arms. Mild periorbital and mid diffuse facial swelling. No blisters or vesicles   Psychiatric: She has a normal mood and affect. Her behavior is normal. Thought content normal.         ED Course   Procedures  Labs Reviewed - No data to display          Medical Decision Making:   ED Management:  Ms Patel feels much better. Has rested.  Rash has nearly resolved, itching is gone.  She wants to go home.  She is felt stable for discharge.  We discussed possible  etiologies of new ALLERGIC reaction, including any of meds she is currently taking and newly started vimpat last week. We discussed home care and worrisome signs that should prompt need to return to the ER should they occur.  She will do home dialysis today.  There is no indication for further emergent intervention or evaluation at this time.                      Clinical Impression:   The primary encounter diagnosis was Rash. A diagnosis of Allergic reaction, initial encounter was also pertinent to this visit.                           Kendra Del Valle MD  03/25/18 5600

## 2018-03-14 ENCOUNTER — TELEPHONE (OUTPATIENT)
Dept: INTERNAL MEDICINE | Facility: CLINIC | Age: 28
End: 2018-03-14

## 2018-03-14 ENCOUNTER — OFFICE VISIT (OUTPATIENT)
Dept: INTERNAL MEDICINE | Facility: CLINIC | Age: 28
End: 2018-03-14
Payer: MEDICARE

## 2018-03-14 ENCOUNTER — OFFICE VISIT (OUTPATIENT)
Dept: TRANSPLANT | Facility: CLINIC | Age: 28
End: 2018-03-14
Payer: MEDICARE

## 2018-03-14 ENCOUNTER — SOCIAL WORK (OUTPATIENT)
Dept: TRANSPLANT | Facility: CLINIC | Age: 28
End: 2018-03-14
Payer: MEDICARE

## 2018-03-14 VITALS
RESPIRATION RATE: 16 BRPM | BODY MASS INDEX: 19.62 KG/M2 | HEIGHT: 65 IN | HEART RATE: 86 BPM | TEMPERATURE: 98 F | OXYGEN SATURATION: 100 % | SYSTOLIC BLOOD PRESSURE: 184 MMHG | DIASTOLIC BLOOD PRESSURE: 135 MMHG | WEIGHT: 117.75 LBS

## 2018-03-14 VITALS
SYSTOLIC BLOOD PRESSURE: 180 MMHG | WEIGHT: 118.38 LBS | HEIGHT: 65 IN | HEART RATE: 84 BPM | BODY MASS INDEX: 19.72 KG/M2 | OXYGEN SATURATION: 93 % | DIASTOLIC BLOOD PRESSURE: 120 MMHG

## 2018-03-14 DIAGNOSIS — R56.9 SEIZURES: ICD-10-CM

## 2018-03-14 DIAGNOSIS — R22.0 SUBMANDIBULAR SWELLING: Primary | ICD-10-CM

## 2018-03-14 DIAGNOSIS — I10 UNCONTROLLED HYPERTENSION: ICD-10-CM

## 2018-03-14 DIAGNOSIS — Z94.4 LIVER REPLACED BY TRANSPLANT: Chronic | ICD-10-CM

## 2018-03-14 DIAGNOSIS — I16.1 HYPERTENSIVE EMERGENCY: ICD-10-CM

## 2018-03-14 DIAGNOSIS — N18.6 ESRD ON HEMODIALYSIS: Chronic | ICD-10-CM

## 2018-03-14 DIAGNOSIS — I10 UNCONTROLLED HYPERTENSION: Primary | ICD-10-CM

## 2018-03-14 DIAGNOSIS — R56.9 SEIZURE IN RESPONSE TO ACUTE EVENT: ICD-10-CM

## 2018-03-14 DIAGNOSIS — Z12.4 CERVICAL CANCER SCREENING: ICD-10-CM

## 2018-03-14 DIAGNOSIS — I15.0 RENOVASCULAR HYPERTENSION: Chronic | ICD-10-CM

## 2018-03-14 DIAGNOSIS — Z99.2 ESRD ON HEMODIALYSIS: Chronic | ICD-10-CM

## 2018-03-14 DIAGNOSIS — D84.9 IMMUNOSUPPRESSION: Chronic | ICD-10-CM

## 2018-03-14 DIAGNOSIS — Z29.89 PROPHYLACTIC IMMUNOTHERAPY: ICD-10-CM

## 2018-03-14 DIAGNOSIS — R59.1 LYMPHADENOPATHY: ICD-10-CM

## 2018-03-14 DIAGNOSIS — R22.1 SUBMANDIBULAR SWELLING: Primary | ICD-10-CM

## 2018-03-14 DIAGNOSIS — I16.1 HYPERTENSIVE EMERGENCY: Primary | ICD-10-CM

## 2018-03-14 PROCEDURE — 99999 PR PBB SHADOW E&M-EST. PATIENT-LVL IV: CPT | Mod: PBBFAC,,, | Performed by: INTERNAL MEDICINE

## 2018-03-14 PROCEDURE — 99495 TRANSJ CARE MGMT MOD F2F 14D: CPT | Mod: PBBFAC | Performed by: INTERNAL MEDICINE

## 2018-03-14 PROCEDURE — 99495 TRANSJ CARE MGMT MOD F2F 14D: CPT | Mod: S$PBB,,, | Performed by: INTERNAL MEDICINE

## 2018-03-14 PROCEDURE — 99215 OFFICE O/P EST HI 40 MIN: CPT | Mod: S$PBB,,, | Performed by: INTERNAL MEDICINE

## 2018-03-14 PROCEDURE — 99214 OFFICE O/P EST MOD 30 MIN: CPT | Mod: PBBFAC,27 | Performed by: INTERNAL MEDICINE

## 2018-03-14 PROCEDURE — 99999 PR PBB SHADOW E&M-EST. PATIENT-LVL III: CPT | Mod: PBBFAC,,, | Performed by: INTERNAL MEDICINE

## 2018-03-14 PROCEDURE — 99213 OFFICE O/P EST LOW 20 MIN: CPT | Mod: PBBFAC | Performed by: INTERNAL MEDICINE

## 2018-03-14 RX ORDER — NIFEDIPINE 60 MG/1
60 TABLET, EXTENDED RELEASE ORAL DAILY
Refills: 0 | COMMUNITY
Start: 2018-03-01 | End: 2018-03-14

## 2018-03-14 RX ORDER — LABETALOL 200 MG/1
400 TABLET, FILM COATED ORAL EVERY 8 HOURS
Qty: 360 TABLET | Refills: 11 | Status: ON HOLD | OUTPATIENT
Start: 2018-03-14 | End: 2018-08-16 | Stop reason: HOSPADM

## 2018-03-14 RX ORDER — HYDRALAZINE HYDROCHLORIDE 50 MG/1
50 TABLET, FILM COATED ORAL EVERY 8 HOURS
Qty: 270 TABLET | Refills: 11 | Status: ON HOLD | OUTPATIENT
Start: 2018-03-14 | End: 2018-04-16

## 2018-03-14 RX ORDER — CLONIDINE 0.2 MG/24H
1 PATCH, EXTENDED RELEASE TRANSDERMAL
Qty: 4 PATCH | Refills: 11 | Status: SHIPPED | OUTPATIENT
Start: 2018-03-14 | End: 2018-06-18 | Stop reason: DRUGHIGH

## 2018-03-14 NOTE — PROGRESS NOTES
Subjective:       Patient ID: Holly Patel is a 27 y.o. female.    Chief Complaint: Hospital Follow Up    Here for hosp f/u. Admitted for HTN emergency with szs. Since then she has started back on clinic HD and not home HD. No more sz activity. Has f/u with sz doctor.  Had hives 5 days ago which have resolved.    Attending HD accept missed when she had hives.    Wt has been stable.    Claims to be taking all meds as rxed and avoiding high salt diet. Mom prepares most foods.    Has new R posterior thigh pains, not below the knee.  Pt denies f/c/ns/wt loss, no fecal incontinence or difficulty with retained urine, no hematuria, no dysuria, no perianal anesthesia, no focal weakness or loss of sensation in feet or legs.        Review of Systems   Constitutional: Negative for activity change, appetite change and unexpected weight change.   Eyes: Negative for visual disturbance.   Respiratory: Negative for cough, chest tightness and shortness of breath.    Cardiovascular: Negative for chest pain.   Gastrointestinal: Negative for abdominal distention and abdominal pain.   Genitourinary: Negative for difficulty urinating, menstrual problem and urgency.             Musculoskeletal: Negative for back pain.   Skin: Negative for rash.   Neurological: Negative for seizures.       Objective:      Physical Exam   Constitutional: She is oriented to person, place, and time. She appears well-developed and well-nourished. No distress.   HENT:   Head: Normocephalic and atraumatic.   Eyes: Pupils are equal, round, and reactive to light. No scleral icterus.   Neck: Normal range of motion. No thyromegaly present.   Cardiovascular: Normal rate and regular rhythm.  Exam reveals no gallop and no friction rub.    Murmur (mild HSM murmur at apex) heard.  Pulmonary/Chest: Effort normal and breath sounds normal. No respiratory distress. She has no wheezes. She has no rales.   Abdominal: Soft. Bowel sounds are normal. She exhibits no  distension and no mass. There is no tenderness. There is no rebound and no guarding.   Musculoskeletal: Normal range of motion. She exhibits no edema or tenderness.   L arm fistula thrill normal, nontender    nl lower extrem strength/sense/DTRs  Neg SLR on the R.  Normal hip rom on the R, points to posterolateral R hip area as area of pain   Lymphadenopathy:     She has no cervical adenopathy.   Neurological: She is alert and oriented to person, place, and time.   Skin: She is not diaphoretic.   Psychiatric: She has a normal mood and affect. Her speech is normal and behavior is normal. Cognition and memory are normal.       Assessment:       1. Cervical cancer screening    2. Seizures    3. Hypertensive emergency    4. Uncontrolled hypertension    5. Renovascular hypertension    6. ESRD on hemodialysis    7. Liver replaced by transplant        Plan:         Holly was seen today for hospital follow up.    Diagnoses and all orders for this visit:    Cervical cancer screening  -     Ambulatory Referral to Gynecology    Seizures  Will see neuro soon    Hypertensive emergency  -     hydrALAZINE (APRESOLINE) 50 MG tablet; Take 1 tablet (50 mg total) by mouth every 8 (eight) hours.  -     cloNIDine 0.2 mg/24 hr td ptwk (CATAPRES) 0.2 mg/24 hr; Place 1 patch onto the skin every 7 days. Change every Wednesday  -     labetalol (NORMODYNE) 200 MG tablet; Take 2 tablets (400 mg total) by mouth every 8 (eight) hours.  Uncontrolled hypertension  -     hydrALAZINE (APRESOLINE) 50 MG tablet; Take 1 tablet (50 mg total) by mouth every 8 (eight) hours.  -     cloNIDine 0.2 mg/24 hr td ptwk (CATAPRES) 0.2 mg/24 hr; Place 1 patch onto the skin every 7 days. Change every Wednesday  -     labetalol (NORMODYNE) 200 MG tablet; Take 2 tablets (400 mg total) by mouth every 8 (eight) hours.  Renovascular hypertension  -     hydrALAZINE (APRESOLINE) 50 MG tablet; Take 1 tablet (50 mg total) by mouth every 8 (eight) hours.  -     cloNIDine  0.2 mg/24 hr td ptwk (CATAPRES) 0.2 mg/24 hr; Place 1 patch onto the skin every 7 days. Change every Wednesday  -     labetalol (NORMODYNE) 200 MG tablet; Take 2 tablets (400 mg total) by mouth every 8 (eight) hours.  2 week LPN BP f/u    ESRD on hemodialysis  Attending tiw    Liver replaced by transplant  utd on f/u      Transitional Care Note    Family and/or Caretaker present at visit?  No.  Diagnostic tests reviewed/disposition: No diagnosic tests pending after this hospitalization.  Disease/illness education: provided  Home health/community services discussion/referrals: Patient has home health established at home.   Establishment or re-establishment of referral orders for community resources: No other necessary community resources.   Discussion with other health care providers: No discussion with other health care providers necessary.             Health Maintenance       Date Due Completion Date    TETANUS VACCINE 09/13/2008 ---    Pap Smear 09/13/2011 ---    Pneumococcal PPSV23 (High Risk) (1) 11/30/2017 ---          Follow-up in about 2 months (around 5/14/2018).

## 2018-03-14 NOTE — PROGRESS NOTES
Subjective:       Patient ID: Holly Patel is a 27 y.o. female.    Chief Complaint: Liver Transplant Follow-up    HPI  I saw this 27 year old  lady in the liver transplant clinic. She is currently on HD three times weekly because of hypertension induced and chronic immunosuppression- induced kidney failure.    Had been on home dialysis- recently switched to outpt HD  On HD since Oct 2015, home HD since Feb 2016.    - 2 recent hospital admissions  -  presumed UTI and more recently with abdominal distension.  - hypertensive emergency and tonic clonic seizure    - started on anti-epileptics    She had high LFTs in June 2017 and a liver biopsy showed stage 2-3 fibrosis as well as evidence of chronic rejection.    She is currently on Tac 1mg BID and finally appears to be taking it more regularly.    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  Denied kidney Tx for compliance      CT abdo: 1/17/2018  1.  Postsurgical changes consistent with liver transplantation with splenomegaly and moderate volume ascites.  2.  Otherwise no acute intra-abdominal abnormalities identified.  3.  Small bilateral pleural effusions, right greater than left with worsening patchy opacification/consolidation within the lower lobes may reflect worsening pulmonary edema versus atypical pneumonia.    MELD-Na score: 21 at 2/18/2018  4:53 AM  MELD score: 21 at 2/18/2018  4:53 AM  Calculated from:  Serum Creatinine: On dialysis. Using 4 mg/dL.  Serum Sodium: 138 mmol/L (Rounded to 137) at 2/18/2018  4:53 AM  Total Bilirubin: 0.4 mg/dL (Rounded to 1) at 2/18/2018  4:53 AM  INR(ratio): 1.1 at 2/16/2018  9:46 PM  Age: 27 years        Lab Results   Component Value Date    ALT 29 02/28/2018    AST 27 02/28/2018     (H) 06/27/2012    ALKPHOS 503 (H) 02/28/2018    BILITOT 0.4 02/28/2018     Past Medical History:   Diagnosis Date    Anemia in ESRD (end-stage renal disease) 10/12/2015    Chronic rejection of liver transplant  3/22/2016    Encounter for blood transfusion     ESRD on hemodialysis 2015    History of splenomegaly 2016    Immunosuppressed 2017    Iron deficiency anemia secondary to inadequate dietary iron intake 2017    [facility]   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 ()    Moderate protein-calorie malnutrition 2017    MRSA bacteremia 2017    Prophylactic immunotherapy 2014    Renovascular hypertension 10/2/2015    Secondary hyperparathyroidism 2017    Thrombocytopenia 2016     Past Surgical History:   Procedure Laterality Date     SECTION      2     KIDNEY TRANSPLANT      LIVER BIOPSY      LIVER TRANSPLANT  1992    TUBAL LIGATION       Current Outpatient Prescriptions   Medication Sig    aspirin 81 MG Chew Take 1 tablet (81 mg total) by mouth once daily.    cinacalcet (SENSIPAR) 30 MG Tab Take 1 tablet (30 mg total) by mouth daily with breakfast.    cloNIDine 0.2 mg/24 hr td ptwk (CATAPRES) 0.2 mg/24 hr Place 1 patch onto the skin every 7 days. Change every Wednesday    famotidine (PEPCID) 20 MG tablet Take 1 tablet (20 mg total) by mouth 2 (two) times daily.    famotidine (PEPCID) 20 MG tablet Take 1 tablet (20 mg total) by mouth 2 (two) times daily.    food supplemt, lactose-reduced (ENSURE ACTIVE HIGH PROTEIN) Liqd Take 236 mLs by mouth 2 (two) times daily.    furosemide (LASIX) 20 MG tablet Take 5 tablets (100 mg total) by mouth 2 (two) times daily.    hydrALAZINE (APRESOLINE) 25 MG tablet Take 1 tablet (25 mg total) by mouth every 8 (eight) hours.    labetalol (NORMODYNE) 200 MG tablet Take 2 tablets (400 mg total) by mouth every 8 (eight) hours.    lacosamide (VIMPAT) 50 mg Tab Take 1 tablet (50 mg total) by mouth every 12 (twelve) hours.    levETIRAcetam (KEPPRA) 500 MG Tab Take 1 tablet (500 mg total) by mouth 2 (two) times daily.    NIFEdipine  (PROCARDIA-XL) 60 MG (OSM) 24 hr tablet Take 1 tablet (60 mg total) by mouth once daily.    ondansetron (ZOFRAN) 4 MG tablet Take 1 tablet (4 mg total) by mouth every 6 (six) hours.    oxyCODONE (ROXICODONE) 5 MG immediate release tablet Take 1 tablet (5 mg total) by mouth every 4 (four) hours as needed for Pain.    predniSONE (DELTASONE) 1 MG tablet Take 1 mg by mouth every other day.    tacrolimus (PROGRAF) 1 MG Cap Take 5 capsules (5 mg total) by mouth every 12 (twelve) hours.    triamcinolone acetonide 0.1% (KENALOG) 0.1 % ointment AAA on arms, legs, and neck bid x 1-2 wks then prn flares only (Patient taking differently: Apply to affected area(s) on arms, legs, and neck twice daily x 1-2 wks then as needed for flares only)     No current facility-administered medications for this visit.        Review of Systems   Constitutional: Negative for activity change, appetite change, chills, fatigue, fever and unexpected weight change.   HENT: Negative for ear pain, hearing loss, nosebleeds, sore throat and trouble swallowing.    Eyes: Negative for redness and visual disturbance.   Respiratory: Negative for cough, chest tightness, shortness of breath and wheezing.    Cardiovascular: Negative for chest pain and palpitations.   Gastrointestinal: Negative for abdominal distention, abdominal pain, blood in stool, constipation, diarrhea, nausea and vomiting.   Genitourinary: Negative for difficulty urinating, dysuria, frequency, hematuria and urgency.   Musculoskeletal: Negative for arthralgias, back pain, gait problem, joint swelling and myalgias.   Skin: Negative for rash.   Neurological: Negative for tremors, seizures, speech difficulty, weakness and headaches.   Hematological: Negative for adenopathy.   Psychiatric/Behavioral: Negative for confusion, decreased concentration and sleep disturbance. The patient is not nervous/anxious.          Objective:    BP (!) 184/135 (BP Location: Right arm, Patient Position:  "Sitting, BP Method: Small (Automatic))   Pulse 86   Temp 98 °F (36.7 °C) (Oral)   Resp 16   Ht 5' 5" (1.651 m)   Wt 53.4 kg (117 lb 11.6 oz)   LMP 02/10/2018 (LMP Unknown)   SpO2 100%   BMI 19.59 kg/m²     Physical Exam   Constitutional: She appears well-developed and well-nourished. No distress.   HENT:   Head: Normocephalic.   Eyes: Pupils are equal, round, and reactive to light.   Neck: No JVD present. No tracheal deviation present. No thyromegaly present.   Cardiovascular: Normal rate, regular rhythm and normal heart sounds.    No murmur heard.  Pulmonary/Chest: Effort normal and breath sounds normal. No stridor.   Abdominal: Soft. Bowel sounds are normal. She exhibits distension. There is no tenderness.   Mild ascites   Musculoskeletal: She exhibits no edema.   Lymphadenopathy:        Head (right side): No submental, no submandibular, no tonsillar, no preauricular, no posterior auricular and no occipital adenopathy present.        Head (left side): No submental, no submandibular, no tonsillar, no preauricular, no posterior auricular and no occipital adenopathy present.     She has no cervical adenopathy.     She has no axillary adenopathy.   Neurological: She is alert. She has normal strength. She is not disoriented. No cranial nerve deficit or sensory deficit.   Skin: Skin is intact. No cyanosis.       Assessment:       1. Uncontrolled hypertension    2. Seizure in response to acute event    3. Prophylactic immunotherapy    4. Liver replaced by transplant    5. Hypertensive emergency    6. Immunosuppressed    7. ESRD on hemodialysis        Plan:   1. Uncontrolled hypertension- nephrologist adjusts her medications  2. Continue with same dose tac and adjust according to levels. I warned her once more that if she remains non compliant, she will lose her liver from chronic rejection and will likely not get another transplant.  3. Latest abdo US shows mild ascites- clinically better than before. She thinks " that the outpt HD is achieving better control of her fluid.  4. Bilateral L>R submandibular LN- noted last year  CT neck in Dec 2017- ?fluid    - needs ENT assessment    Clinic in 3 months.

## 2018-03-14 NOTE — LETTER
March 14, 2018        Enrrique Moise  [address]  [facility] [place] [facility] [place] [address]  Phone: [phone]  Fax: [phone]             [facility]  [address]  [facility] [address]  Phone: [phone]   Patient: Holly Patel   MR Number: [id number]   YOB: 1990   Date of Visit: 3/14/2018       Dear Dr. Enrrique Moise    Thank you for referring Holly Patel to me for evaluation. Attached you will find relevant portions of my assessment and plan of care.    If you have questions, please do not hesitate to call me. I look forward to following Holly Patel along with you.    Sincerely,    Lei Pinedo MD    Enclosure    If you would like to receive this communication electronically, please contact [email or web address] or [phone] to request [employer] access.    [employer] is a tool which provides read-only access to select patient information with whom you have a relationship. Its easy to use and provides real time access to review your patients record including encounter summaries, notes, results, and demographic information.    If you feel you have received this communication in error or would no longer like to receive these types of communications, please e-mail [email or web address]

## 2018-03-14 NOTE — PATIENT INSTRUCTIONS
Back Exercises: Leg Reach        Do this exercise on your hands and knees. Keep your knees under your hips and your hands under your shoulders. Keep your spine in a neutral position (not arched or sagging). Be sure to maintain your necks natural curve.  · Extend one leg straight back. Dont arch your back or let your head or body sag.  · Hold for 5 seconds. Return to starting position.  · Repeat 5 times.  · Switch legs.   © 3275-9543 [employer]. [address] [place] [address]. All rights reserved. This information is not intended as a substitute for professional medical care. Always follow your healthcare professional's instructions.        Back Exercises: Lower Back Stretch                        To start, sit in a chair with your feet flat on the floor. Shift your weight slightly forward to avoid rounding your back. Relax, and keep your ears, shoulders, and hips aligned.  · Sit with your feet well apart.  · Bend forward and touch the floor with the backs of your hands. Relax and let your body drop.  · Hold for 20 seconds. Return to starting position.  · Repeat 2 times.   © 0242-4285 [employer]. [address] [place] [address]. All rights reserved. This information is not intended as a substitute for professional medical care. Always follow your healthcare professional's instructions.        Leg and Knee Exercises: Leg Raise    This exercise is designed to stretch and strengthen your knee. Before beginning, read through all the instructions. While exercising, breathe normally and use smooth movements. If you feel any pain, stop the exercise. If pain persists, call your health care provider.  CAUTION  · Dont arch your back.  · Dont hunch your shoulders.   · Sit on the floor with your _________ leg straight, the other bent.  · Tighten the thigh muscles on the top of your straight leg. You should feel the muscles contract. Raise that leg 6-8 inches. Then lower it  slowly and steadily to the floor. Relax.  · Repeat ______ times.  Do ______ sets a day.  © 5325-6240 [employer]. [address] [place] [address], [place] [address]. All rights reserved. This information is not intended as a substitute for professional medical care. Always follow your healthcare professional's instructions.

## 2018-03-14 NOTE — PROGRESS NOTES
"[facility] Clinic Follow Up:    SW presented to clinic for follow up and continuity of care.  Pt ([phone]) presented as alert and oriented, communicative, and cooperative.  Pt was accompanied alone at visit.  Pt reports she has been transferred to an outpatient HD clinic and taken off home PD due recent blood pressure issues.  Pt now attends [facility] (ph# [phone]) on T/TH/S @ 7:00 am.  Pt has been there one week so far and reports she has been compliant with HD treatment and that she likes outpatient better.  SW contacted center who confirms pt's compliance thus far.  SW addressed compliance with medications.  Pt reports she has been compliant with her medications, taking the correct dose, and taking them on time.  Pt was able to get a new blue card today and review with pharm D.  Pt does admit she has not been completely compliant in the past with medications but states she has been now.  SW discussed the importance of pt taking her medications and the risks of not taking them or being compliant.  Pt verbalized her understanding and states she wants to continue being compliant.  Pt states she has [facility]:  who comes to her home twice a week to check her vitals, BP, and pill box.  Prior to today and getting a new blue card, pt states she just "memorized" her medications.  SW explained it is important to always follow blue card and contact team if she looses it.  Pt states she wants to be put on the kidney transplant list.  OFELIA explained pt was denied on 3 occassions in the past ([id number]) due to insurance issues and non-compliance with medications/treatment.  OFELIA explained to pt that non-compliance can cause a patient to be denied for listing and that she needs to keep up on being compliant so she can serve as a suitable candidate for transplant.  Pt verbalizes her understanding and states she wants to be around for her children (ages: 7 & 9) and is going to do what she needs to do. OEFLIA commended pt for " doing a good job and encouraged her to continue. Per hepatologist pt may need a kidney and liver transplant, team monitoring. Pt inquired about dental resources to get a cleaning who take medicaid. OFELIA provided pt with the contact information to \[facility]\"" [facility]. Pt denies any current issues or concerns. No mental health difficulties noted. OFELIA provided this update to pt's RN Claritza Francis and Hepatologist Dr. Law. OFELIA remains available for education, resources, and support as appropriate.

## 2018-03-14 NOTE — TELEPHONE ENCOUNTER
----- Message from Russell Ventura sent at 3/13/2018  9:25 AM CDT -----  Contact: Cindy - [facility] at [phone]  Pt's bp is currently 180/100, heart rate 98. Pt is going to dialysis today at 11. Neck area is swollen.

## 2018-03-14 NOTE — TELEPHONE ENCOUNTER
Hi, please see if home health nurse can recheck her pressure tomorrow after dialysis.  Thank you, Stan Sosa

## 2018-03-16 ENCOUNTER — TELEPHONE (OUTPATIENT)
Dept: INTERNAL MEDICINE | Facility: CLINIC | Age: 28
End: 2018-03-16

## 2018-03-16 NOTE — TELEPHONE ENCOUNTER
Hi, please call the nurse back and confirm -- that she has increased the hydralazine to 50mg three times per day and that she is taking her BP meds as prescribed:    has a current medication list which includes the following prescription(s): clonidine 0.2 mg/24 hr td ptwk, hydralazine, labetalol, nifedipine,     Thank you, Stan Sosa

## 2018-03-16 NOTE — TELEPHONE ENCOUNTER
----- Message from Shae Portillo sent at 3/16/2018  9:43 AM CDT -----  Contact: [phone]  Pt's blood pressure is still high 180 over 100. Pt's face is no longer swollen. Please call and advise.      Thank You

## 2018-03-21 ENCOUNTER — LAB VISIT (OUTPATIENT)
Dept: LAB | Facility: HOSPITAL | Age: 28
End: 2018-03-21
Attending: INTERNAL MEDICINE
Payer: MEDICARE

## 2018-03-21 ENCOUNTER — INITIAL CONSULT (OUTPATIENT)
Dept: OTOLARYNGOLOGY | Facility: CLINIC | Age: 28
End: 2018-03-21
Payer: MEDICARE

## 2018-03-21 VITALS
DIASTOLIC BLOOD PRESSURE: 92 MMHG | TEMPERATURE: 98 F | SYSTOLIC BLOOD PRESSURE: 152 MMHG | HEART RATE: 87 BPM | WEIGHT: 116.88 LBS | BODY MASS INDEX: 19.44 KG/M2

## 2018-03-21 DIAGNOSIS — K11.20 SIALADENITIS: Primary | ICD-10-CM

## 2018-03-21 DIAGNOSIS — D84.9 IMMUNOSUPPRESSION: Chronic | ICD-10-CM

## 2018-03-21 DIAGNOSIS — N18.6 ESRD ON HEMODIALYSIS: Chronic | ICD-10-CM

## 2018-03-21 DIAGNOSIS — Z94.4 LIVER TRANSPLANTED: ICD-10-CM

## 2018-03-21 DIAGNOSIS — Z99.2 ESRD ON HEMODIALYSIS: Chronic | ICD-10-CM

## 2018-03-21 LAB
ALBUMIN SERPL BCP-MCNC: 2.8 G/DL
ALP SERPL-CCNC: 636 U/L
ALT SERPL W/O P-5'-P-CCNC: 44 U/L
ANION GAP SERPL CALC-SCNC: 12 MMOL/L
AST SERPL-CCNC: 41 U/L
BASOPHILS # BLD AUTO: 0.02 K/UL
BASOPHILS NFR BLD: 0.6 %
BILIRUB SERPL-MCNC: 0.7 MG/DL
BUN SERPL-MCNC: 22 MG/DL
CALCIUM SERPL-MCNC: 9.6 MG/DL
CHLORIDE SERPL-SCNC: 95 MMOL/L
CO2 SERPL-SCNC: 33 MMOL/L
CREAT SERPL-MCNC: 6.5 MG/DL
DIFFERENTIAL METHOD: ABNORMAL
EOSINOPHIL # BLD AUTO: 0.3 K/UL
EOSINOPHIL NFR BLD: 9.3 %
ERYTHROCYTE [DISTWIDTH] IN BLOOD BY AUTOMATED COUNT: 18.8 %
EST. GFR  (AFRICAN AMERICAN): 9.3 ML/MIN/1.73 M^2
EST. GFR  (NON AFRICAN AMERICAN): 8.1 ML/MIN/1.73 M^2
GLUCOSE SERPL-MCNC: 147 MG/DL
HCT VFR BLD AUTO: 24.2 %
HGB BLD-MCNC: 7.7 G/DL
IMM GRANULOCYTES # BLD AUTO: 0.01 K/UL
IMM GRANULOCYTES NFR BLD AUTO: 0.3 %
LYMPHOCYTES # BLD AUTO: 0.5 K/UL
LYMPHOCYTES NFR BLD: 15.4 %
MCH RBC QN AUTO: 26 PG
MCHC RBC AUTO-ENTMCNC: 31.8 G/DL
MCV RBC AUTO: 82 FL
MONOCYTES # BLD AUTO: 0.2 K/UL
MONOCYTES NFR BLD: 5.8 %
NEUTROPHILS # BLD AUTO: 2.1 K/UL
NEUTROPHILS NFR BLD: 68.6 %
NRBC BLD-RTO: 0 /100 WBC
PLATELET # BLD AUTO: 81 K/UL
PMV BLD AUTO: 9.8 FL
POTASSIUM SERPL-SCNC: 3.4 MMOL/L
PROT SERPL-MCNC: 7.7 G/DL
RBC # BLD AUTO: 2.96 M/UL
SODIUM SERPL-SCNC: 140 MMOL/L
TACROLIMUS BLD-MCNC: <1.5 NG/ML
WBC # BLD AUTO: 3.11 K/UL

## 2018-03-21 PROCEDURE — 80197 ASSAY OF TACROLIMUS: CPT

## 2018-03-21 PROCEDURE — 99213 OFFICE O/P EST LOW 20 MIN: CPT | Mod: PBBFAC | Performed by: OTOLARYNGOLOGY

## 2018-03-21 PROCEDURE — 99204 OFFICE O/P NEW MOD 45 MIN: CPT | Mod: S$PBB,,, | Performed by: OTOLARYNGOLOGY

## 2018-03-21 PROCEDURE — 80053 COMPREHEN METABOLIC PANEL: CPT

## 2018-03-21 PROCEDURE — 99999 PR PBB SHADOW E&M-EST. PATIENT-LVL III: CPT | Mod: PBBFAC,,, | Performed by: OTOLARYNGOLOGY

## 2018-03-21 PROCEDURE — 36415 COLL VENOUS BLD VENIPUNCTURE: CPT

## 2018-03-21 PROCEDURE — 85025 COMPLETE CBC W/AUTO DIFF WBC: CPT

## 2018-03-21 NOTE — LETTER
March 21, 2018      Lei Pinedo MD  [address]  [facility] [address]           [facility]  [address]  [facility] [address]  Phone: [phone]  Fax: [phone]          Patient: Holly Patel   MR Number: [id number]   YOB: 1990   Date of Visit: 3/21/2018       Dear Dr. Lei Pinedo:    Thank you for referring Holly Patel to me for evaluation. Attached you will find relevant portions of my assessment and plan of care.    If you have questions, please do not hesitate to call me. I look forward to following Holly Patel along with you.    Sincerely,    Prosper Santamaria MD    Enclosure  CC:  No Recipients    If you would like to receive this communication electronically, please contact [email or web address] or [phone] to request more information on [employer] Link access.    For providers and/or their staff who would like to refer a patient to [facility], please contact us through our one-stop-shop provider referral line, [facility], at [phone].    If you feel you have received this communication in error or would no longer like to receive these types of communications, please e-mail [email or web address]

## 2018-03-21 NOTE — PROGRESS NOTES
Head and Neck Surgery Consult    Seen in consultation from Dr. Pinedo    HPI: Holly Patel is a 27 y.o. female presenting with rare, recurrent submandibular swelling. Last episode was a few weeks ago and resolved spontaneously. No concurrent fevers/chills/night sweats or bad taste in mouth. No sicca symptoms. She had liver transplant at age 2 and has evidence of chronic rejection. She is on HD for immunosuppression-induced renal failure. She is unable to relate the facial swelling definitively with her HD.    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 ()    Moderate protein-calorie malnutrition 2017    MRSA bacteremia 2017    Prophylactic immunotherapy 2014    Renovascular hypertension 10/2/2015    Secondary hyperparathyroidism 2017    Thrombocytopenia 2016       Past Surgical History:   Procedure Laterality Date     SECTION      2     KIDNEY TRANSPLANT      LIVER BIOPSY      LIVER TRANSPLANT  1992    TUBAL LIGATION           Current Outpatient Prescriptions:     aspirin 81 MG Chew, Take 1 tablet (81 mg total) by mouth once daily., Disp: , Rfl: 0    cinacalcet (SENSIPAR) 30 MG Tab, Take 1 tablet (30 mg total) by mouth daily with breakfast., Disp: 30 tablet, Rfl: 11    cloNIDine 0.2 mg/24 hr td ptwk (CATAPRES) 0.2 mg/24 hr, Place 1 patch onto the skin every 7 days. Change every Wednesday, Disp: 4 patch, Rfl: 11    famotidine (PEPCID) 20 MG tablet, Take 1 tablet (20 mg total) by mouth 2 (two) times  daily., Disp: 20 tablet, Rfl: 0    food supplemt, lactose-reduced (ENSURE ACTIVE HIGH PROTEIN) Liqd, Take 236 mLs by mouth 2 (two) times daily., Disp: 60 Can, Rfl: 6    hydrALAZINE (APRESOLINE) 50 MG tablet, Take 1 tablet (50 mg total) by mouth every 8 (eight) hours., Disp: 270 tablet, Rfl: 11    labetalol (NORMODYNE) 200 MG tablet, Take 2 tablets (400 mg total) by mouth every 8 (eight) hours., Disp: 360 tablet, Rfl: 11    lacosamide (VIMPAT) 50 mg Tab, Take 1 tablet (50 mg total) by mouth every 12 (twelve) hours., Disp: 60 tablet, Rfl: 0    levETIRAcetam (KEPPRA) 500 MG Tab, Take 1 tablet (500 mg total) by mouth 2 (two) times daily., Disp: 60 tablet, Rfl: 0    NIFEdipine (PROCARDIA-XL) 60 MG (OSM) 24 hr tablet, Take 1 tablet (60 mg total) by mouth once daily., Disp: 30 tablet, Rfl: 0    ondansetron (ZOFRAN) 4 MG tablet, Take 1 tablet (4 mg total) by mouth every 6 (six) hours., Disp: 12 tablet, Rfl: 0    oxyCODONE (ROXICODONE) 5 MG immediate release tablet, Take 1 tablet (5 mg total) by mouth every 4 (four) hours as needed for Pain., Disp: 12 tablet, Rfl: 0    predniSONE (DELTASONE) 1 MG tablet, Take 1 mg by mouth every other day., Disp: , Rfl:     tacrolimus (PROGRAF) 1 MG Cap, Take 5 capsules (5 mg total) by mouth every 12 (twelve) hours., Disp: 300 capsule, Rfl: 11    triamcinolone acetonide 0.1% (KENALOG) 0.1 % ointment, AAA on arms, legs, and neck bid x 1-2 wks then prn flares only (Patient taking differently: Apply to affected area(s) on arms, legs, and neck twice daily x 1-2 wks then as needed for flares only), Disp: 80 g, Rfl: 3    Review of patient's allergies indicates:   Allergen Reactions    Chloral hydrate      Other reaction(s): Hallucinations  Other reaction(s): Hives    Hydrocodone Other (See Comments)     Mental status changes       Family History   Problem Relation Age of Onset    Hypertension Mother     Hypertension Father     Melanoma Neg Hx        Social History     Social  History    Marital status: Legally      Spouse name: N/A    Number of children: N/A    Years of education: N/A     Occupational History    Not on file.     Social History Main Topics    Smoking status: Never Smoker    Smokeless tobacco: Never Used    Alcohol use No    Drug use: No    Sexual activity: Yes     Partners: Male     Other Topics Concern    Are You Pregnant Or Think You May Be? No    Breast-Feeding No     Social History Narrative    Lives 2 kids, 7 and 8, and nephew. Her mom helps with kids.       Review of Systems -  Constitutional: Denies having night sweats, constant fatigue, loss of appetite or recent substantial weight loss.  Eyes: Denies blurred vision or double vision.  Respiratory: Denies symptoms of shortness of breath, noisy breathing, hoarseness or chronic cough.  GI: Denies symptoms of heartburn, acid regurgitation, or the known presence of a hiatal hernia.  The remainder of a 10-point review of systems is negative    REVIEW OF RADIOLOGICAL FILMS AND RECORDS (PERSONALLY REVIEWED):  CTA neck reviewed - benign, symmetric submandibular gland enlargement. No LAD. No stones.    PHYSICAL EXAM:  Vitals - BP (!) 152/92 (Patient Position: Sitting)   Pulse 87   Temp 98 °F (36.7 °C) (Tympanic)   Wt 53 kg (116 lb 13.5 oz)   LMP 02/10/2018 (LMP Unknown)   BMI 19.44 kg/m²   Constitutional -      General Appearance: well developed, well nourished, without obvious deformities     Communication: speaks with a normal voice without hoarseness  Head & Face -     Overall: no obvious scars, lesions or masses     Parotid and submandibular glands: no masses or tenderness, palpable and clear saliva expressed     Facial strength: normal and equal bilaterally  Eyes -      EOM intact  Ear, Nose, Mouth & Throat -     Ears: both left and right external auditory canals and TM's are normal, no external deformities     Nasal exam: mucosa is pink, septum is midline, visible turbinates are normal on  anterior rhinoscopy     Mastication: teeth appear present     Oral Cavity and oropharynx: mucosa, hard and soft palates, tongue, posterior pharyngeal wall, lips and gums are without lesions. Tonsils appear minimal  Respiratory:     Breathing unlabored  Larynx: using the mirror for indirect laryngoscopy, the epiglottic, false cords, true cords, and pyriform sinuses are without lesions and the true vocal cords move normally     Neck: appears symmetric, and on palpation is without masses or lymphadenopathy     Thyroid: no asymmetry, thyromegaly, or thyroid nodules on palpation  Cranial Nerves:      II: Pupillary reflexes normal     III, IV, VI: EOM normal     V: 1,2,3: normal sensation     VII: Normal strength in all divisions     IX, X: Normal voice, palatal elevation and sensation     XI: Shoulder strength normal       XII: Tongue mobility normal  Psychiatric:     Appropriate affect    ASSESSMENT: Benign submandibular gland enlargement/chronic sialadenitis    PLAN: Explained the acute episodes of her salivary gland enlargement represent exacerbation of chronic sialadenitis. This is often precipitated by dehydration (either from URI or related to HD). Her underlying chronic sialadenitis is likely due to her immunosuppression. She may experience flares such as this in the future, if it becomes painful she will require antibiotic treatment with clindamycin or similar.       Prosper Santamaria

## 2018-03-23 ENCOUNTER — TELEPHONE (OUTPATIENT)
Dept: TRANSPLANT | Facility: CLINIC | Age: 28
End: 2018-03-23

## 2018-03-23 ENCOUNTER — TELEPHONE (OUTPATIENT)
Dept: INTERNAL MEDICINE | Facility: CLINIC | Age: 28
End: 2018-03-23

## 2018-03-23 DIAGNOSIS — Z94.4 LIVER TRANSPLANTED: ICD-10-CM

## 2018-03-23 DIAGNOSIS — Z94.4 LIVER TRANSPLANTED: Primary | ICD-10-CM

## 2018-03-23 RX ORDER — TACROLIMUS 1 MG/1
7 CAPSULE ORAL EVERY 12 HOURS
Qty: 420 CAPSULE | Refills: 11 | Status: ON HOLD | OUTPATIENT
Start: 2018-03-23 | End: 2018-08-16 | Stop reason: HOSPADM

## 2018-03-23 NOTE — TELEPHONE ENCOUNTER
Called pt with dose change. States she is taking all of her medications and she has not missed any doses. She verbalized understanding to increase tac 7mg BID starting tonight. She will repeat labs next week on 3/29/18 prior to her neuro apt.

## 2018-03-23 NOTE — TELEPHONE ENCOUNTER
----- Message from Filiberto Calderon sent at 3/23/2018  3:07 PM CDT -----  Contact: [facility]/ [phone]  He will fax a release of information form about the developmental service the patient is applying for.    Thank you

## 2018-03-23 NOTE — TELEPHONE ENCOUNTER
----- Message from Lei Pinedo MD sent at 3/23/2018 10:56 AM CDT -----  Try 7/7  ----- Message -----  From: Violeta Francis RN  Sent: 3/23/2018  10:40 AM  To: Lei Pinedo MD    Do you want to increase her prograf?     ----- Message -----  From: Lei Pinedo MD  Sent: 3/22/2018   4:48 PM  To: [facility]    Results reviewed

## 2018-03-27 ENCOUNTER — TELEPHONE (OUTPATIENT)
Dept: INTERNAL MEDICINE | Facility: CLINIC | Age: 28
End: 2018-03-27

## 2018-03-27 ENCOUNTER — CLINICAL SUPPORT (OUTPATIENT)
Dept: INTERNAL MEDICINE | Facility: CLINIC | Age: 28
End: 2018-03-27
Payer: MEDICARE

## 2018-03-27 DIAGNOSIS — Z01.30 BLOOD PRESSURE CHECK: Primary | ICD-10-CM

## 2018-03-27 NOTE — PROGRESS NOTES
Pt is here for the b/p check. B/p at the office is 141/88, pulse 86. Pt states that she was at the dialysis this morning and her b/p there was 112/80. Pt is feeling well overall. States that she has been consistent with taking b/p medications , took her medications this morning at 8 am. Dr Sosa notified, pt is discharged.

## 2018-03-27 NOTE — TELEPHONE ENCOUNTER
----- Message from Filiberto Calderon sent at 3/27/2018 10:43 AM CDT -----  Contact: Patient [phone]  She said she checked her BP right after dialysis and it was 112/62 and she wants to know if you still wants her to come in for a BP check.    Thank you

## 2018-03-28 ENCOUNTER — OFFICE VISIT (OUTPATIENT)
Dept: OBSTETRICS AND GYNECOLOGY | Facility: CLINIC | Age: 28
End: 2018-03-28
Payer: MEDICARE

## 2018-03-28 VITALS
WEIGHT: 118.63 LBS | SYSTOLIC BLOOD PRESSURE: 124 MMHG | DIASTOLIC BLOOD PRESSURE: 74 MMHG | HEIGHT: 65 IN | BODY MASS INDEX: 19.76 KG/M2

## 2018-03-28 DIAGNOSIS — Z01.419 ENCOUNTER FOR GYNECOLOGICAL EXAMINATION WITHOUT ABNORMAL FINDING: Primary | ICD-10-CM

## 2018-03-28 DIAGNOSIS — Z12.4 PAP SMEAR FOR CERVICAL CANCER SCREENING: ICD-10-CM

## 2018-03-28 DIAGNOSIS — N89.8 VAGINAL DISCHARGE: ICD-10-CM

## 2018-03-28 LAB
C TRACH DNA SPEC QL NAA+PROBE: NOT DETECTED
N GONORRHOEA DNA SPEC QL NAA+PROBE: NOT DETECTED

## 2018-03-28 PROCEDURE — 88141 CYTOPATH C/V INTERPRET: CPT | Mod: ,,, | Performed by: PATHOLOGY

## 2018-03-28 PROCEDURE — 87491 CHLMYD TRACH DNA AMP PROBE: CPT

## 2018-03-28 PROCEDURE — 87480 CANDIDA DNA DIR PROBE: CPT

## 2018-03-28 PROCEDURE — 99213 OFFICE O/P EST LOW 20 MIN: CPT | Mod: PBBFAC | Performed by: OBSTETRICS & GYNECOLOGY

## 2018-03-28 PROCEDURE — 88175 CYTOPATH C/V AUTO FLUID REDO: CPT | Performed by: PATHOLOGY

## 2018-03-28 PROCEDURE — 99999 PR PBB SHADOW E&M-EST. PATIENT-LVL III: CPT | Mod: PBBFAC,,, | Performed by: OBSTETRICS & GYNECOLOGY

## 2018-03-28 PROCEDURE — G0101 CA SCREEN;PELVIC/BREAST EXAM: HCPCS | Mod: S$PBB,,, | Performed by: OBSTETRICS & GYNECOLOGY

## 2018-03-28 PROCEDURE — 87624 HPV HI-RISK TYP POOLED RSLT: CPT

## 2018-03-28 NOTE — PROGRESS NOTES
Subjective:       Patient ID: Holly Patel is a 27 y.o. female.    Chief Complaint:  Well Woman      History of Present Illness  HPI    Holly Patel is a 27 y.o. female  NEW TO ME here for her annual GYN exam.    She describes her periods as regular, normal flow, lasting 5 days.   denies break through bleeding.   denies vaginal itching or irritation.  REPORTS vaginal discharge.  She is not currently sexually active. (states has not been active in 5 months)  She uses condoms, bilateral tubal ligation for contraception.   History of abnormal pap: No  Last Pap: approximate date  and was normal  denies domestic violence. She does feel safe at home.     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     Social History Main Topics    Smoking status: Never Smoker    Smokeless tobacco: Never Used    Alcohol use No    Drug use: No  "   Sexual activity: No     Other Topics Concern    Are You Pregnant Or Think You May Be? No    Breast-Feeding No     Social History Narrative    Lives 2 kids, 7 and 8, and nephew. Her mom helps with kids.     Family History   Problem Relation Age of Onset    Hypertension Mother     Hypertension Father     Melanoma Neg Hx     Breast cancer Neg Hx     Colon cancer Neg Hx     Ovarian cancer Neg Hx      OB History      Para Term  AB Living    2 2 0 2 0 2    SAB TAB Ectopic Multiple Live Births    0 0 0   2          /74   Ht 5' 5" (1.651 m)   Wt 53.8 kg (118 lb 9.7 oz)   LMP 02/10/2018 (Approximate)   BMI 19.74 kg/m²         GYN & OB History  Patient's last menstrual period was 02/10/2018 (approximate).   Date of Last Pap: No result found    OB History    Para Term  AB Living   2 2 0 2 0 2   SAB TAB Ectopic Multiple Live Births   0 0 0   2      # Outcome Date GA Lbr Peter/2nd Weight Sex Delivery Anes PTL Lv   2       CS-LTranv   JOANN   1       CS-LTranv   JOANN          Review of Systems  Review of Systems   Constitutional: Negative for activity change, appetite change, fatigue and unexpected weight change.   HENT: Negative.    Eyes: Negative for visual disturbance.   Respiratory: Negative for shortness of breath and wheezing.    Cardiovascular: Negative for chest pain, palpitations and leg swelling.   Gastrointestinal: Negative for abdominal pain, bloating and blood in stool.   Endocrine: Negative for diabetes and hair loss.   Genitourinary: Positive for vaginal discharge and vaginal odor. Negative for decreased libido, dyspareunia, menorrhagia and menstrual problem.   Musculoskeletal: Negative for back pain and joint swelling.   Skin:  Negative for no acne and hair changes.   Neurological: Negative for headaches.   Hematological: Does not bruise/bleed easily.   Psychiatric/Behavioral: Negative for depression and sleep disturbance. The patient is not " nervous/anxious.    Breast: Negative for breast pain and nipple discharge          Objective:    Physical Exam:   Constitutional: She is oriented to person, place, and time. She appears well-developed and well-nourished.    HENT:   Head: Normocephalic and atraumatic.    Eyes: EOM are normal. Pupils are equal, round, and reactive to light.    Neck: Normal range of motion. Neck supple.    Cardiovascular: Normal rate and regular rhythm.     Pulmonary/Chest: Effort normal and breath sounds normal.   BREASTS: Symmetrical, no skin changes or visible lesions.  No palpable masses, nipple discharge bilaterally.          Abdominal: Soft. Bowel sounds are normal.     Genitourinary: Pelvic exam was performed with patient supine.   Genitourinary Comments: PELVIC: Normal external genitalia without lesions.  Normal hair distribution.  Adequate perineal body, normal urethral meatus.  Vagina moist and well rugated without lesions or discharge.  Cervix pink, FRIABLE without lesions,  or tenderness. COPIOUS FROTHY DISCHARGE .  No significant cystocele or rectocele.  Bimanual exam shows uterus to be normal size, regular, mobile and nontender.  Adnexa without masses or tenderness.               Musculoskeletal: Normal range of motion and moves all extremeties.       Neurological: She is alert and oriented to person, place, and time.    Skin: Skin is warm and dry.    Psychiatric: She has a normal mood and affect.          Assessment:        1. Encounter for gynecological examination without abnormal finding    2. Pap smear for cervical cancer screening    3. Vaginal discharge               Plan:        1. Encounter for gynecological examination without abnormal finding  COUNSELING:  The patient was counseled today on osteoporosis prevention, calcium supplementation, and regular weight bearing exercise. The patient was also counseled today on ACS PAP guidelines, with recommendations for yearly pelvic exams unless their uterus, cervix, and  ovaries were removed for benign reasons; in that case, examinations every 3-5 years are recommended. The patient was also counseled regarding monthly breast self-examination, routine STD screening for at-risk populations, prophylactic immunizations for transmitted infections such as HPV, Pertussis, or Influenza as appropriate, and yearly mammograms when indicated by ACS guidelines. She was advised to see her primary care physician for all other health maintenance.   FOLLOW-UP with me for next routine visit.         2. Pap smear for cervical cancer screening    - Liquid-based pap smear, screening    3. Vaginal discharge    - Vaginosis Screen by DNA Probe  - C. trachomatis/N. gonorrhoeae by AMP DNA Cervix       Follow-up in about 1 year (around 3/28/2019).

## 2018-03-28 NOTE — LETTER
March 28, 2018      Stan Sosa MD  [address]  [facility] [address]           [facility] 5th Floor  [address]  [facility] [address]  Phone: [phone]          Patient: Holly Patel   MR Number: [id number]   YOB: 1990   Date of Visit: 3/28/2018       Dear Dr. Stan Sosa:    Thank you for referring Holly Patel to me for evaluation. Attached you will find relevant portions of my assessment and plan of care.    If you have questions, please do not hesitate to call me. I look forward to following Holly Patel along with you.    Sincerely,    Neelam Marroquin MD    Enclosure  CC:  No Recipients    If you would like to receive this communication electronically, please contact [email or web address] or [phone] to request more information on [employer] access.    For providers and/or their staff who would like to refer a patient to [facility], please contact us through our one-stop-shop provider referral line, [facility], at [phone].    If you feel you have received this communication in error or would no longer like to receive these types of communications, please e-mail [email or web address]

## 2018-03-29 ENCOUNTER — LAB VISIT (OUTPATIENT)
Dept: LAB | Facility: HOSPITAL | Age: 28
End: 2018-03-29
Attending: INTERNAL MEDICINE
Payer: MEDICARE

## 2018-03-29 ENCOUNTER — OFFICE VISIT (OUTPATIENT)
Dept: NEUROLOGY | Facility: CLINIC | Age: 28
End: 2018-03-29
Payer: MEDICARE

## 2018-03-29 ENCOUNTER — TELEPHONE (OUTPATIENT)
Dept: TRANSPLANT | Facility: CLINIC | Age: 28
End: 2018-03-29

## 2018-03-29 VITALS
SYSTOLIC BLOOD PRESSURE: 128 MMHG | BODY MASS INDEX: 19.76 KG/M2 | DIASTOLIC BLOOD PRESSURE: 70 MMHG | HEIGHT: 65 IN | HEART RATE: 87 BPM | WEIGHT: 118.63 LBS

## 2018-03-29 DIAGNOSIS — A59.9 TRICHOMONAS INFECTION: ICD-10-CM

## 2018-03-29 DIAGNOSIS — R56.9 SEIZURES: Primary | ICD-10-CM

## 2018-03-29 DIAGNOSIS — Z94.4 LIVER TRANSPLANTED: ICD-10-CM

## 2018-03-29 DIAGNOSIS — I15.0 RENOVASCULAR HYPERTENSION: Chronic | ICD-10-CM

## 2018-03-29 DIAGNOSIS — A59.9 TRICHOMONAS INFECTION: Primary | ICD-10-CM

## 2018-03-29 PROBLEM — I16.1 HYPERTENSIVE EMERGENCY: Status: RESOLVED | Noted: 2018-02-23 | Resolved: 2018-03-29

## 2018-03-29 PROBLEM — I10 UNCONTROLLED HYPERTENSION: Status: RESOLVED | Noted: 2018-01-24 | Resolved: 2018-03-29

## 2018-03-29 LAB
ALBUMIN SERPL BCP-MCNC: 2.8 G/DL
ALP SERPL-CCNC: 605 U/L
ALT SERPL W/O P-5'-P-CCNC: 36 U/L
ANION GAP SERPL CALC-SCNC: 11 MMOL/L
AST SERPL-CCNC: 42 U/L
BASOPHILS # BLD AUTO: 0.01 K/UL
BASOPHILS NFR BLD: 0.4 %
BILIRUB SERPL-MCNC: 0.6 MG/DL
BUN SERPL-MCNC: 21 MG/DL
CALCIUM SERPL-MCNC: 9.2 MG/DL
CANDIDA RRNA VAG QL PROBE: NEGATIVE
CHLORIDE SERPL-SCNC: 95 MMOL/L
CO2 SERPL-SCNC: 35 MMOL/L
CREAT SERPL-MCNC: 4.6 MG/DL
DIFFERENTIAL METHOD: ABNORMAL
EOSINOPHIL # BLD AUTO: 0.2 K/UL
EOSINOPHIL NFR BLD: 8.4 %
ERYTHROCYTE [DISTWIDTH] IN BLOOD BY AUTOMATED COUNT: 19.3 %
EST. GFR  (AFRICAN AMERICAN): 14.1 ML/MIN/1.73 M^2
EST. GFR  (NON AFRICAN AMERICAN): 12.2 ML/MIN/1.73 M^2
G VAGINALIS RRNA GENITAL QL PROBE: NEGATIVE
GLUCOSE SERPL-MCNC: 106 MG/DL
HCT VFR BLD AUTO: 23.9 %
HGB BLD-MCNC: 7.5 G/DL
IMM GRANULOCYTES # BLD AUTO: 0.01 K/UL
IMM GRANULOCYTES NFR BLD AUTO: 0.4 %
LYMPHOCYTES # BLD AUTO: 0.4 K/UL
LYMPHOCYTES NFR BLD: 13.3 %
MCH RBC QN AUTO: 25.9 PG
MCHC RBC AUTO-ENTMCNC: 31.4 G/DL
MCV RBC AUTO: 82 FL
MONOCYTES # BLD AUTO: 0.2 K/UL
MONOCYTES NFR BLD: 5.7 %
NEUTROPHILS # BLD AUTO: 1.9 K/UL
NEUTROPHILS NFR BLD: 71.8 %
NRBC BLD-RTO: 0 /100 WBC
PLATELET # BLD AUTO: 65 K/UL
PMV BLD AUTO: 11.2 FL
POTASSIUM SERPL-SCNC: 3.3 MMOL/L
PROT SERPL-MCNC: 7.7 G/DL
RBC # BLD AUTO: 2.9 M/UL
SODIUM SERPL-SCNC: 141 MMOL/L
T VAGINALIS RRNA GENITAL QL PROBE: POSITIVE
TACROLIMUS BLD-MCNC: <1.5 NG/ML
WBC # BLD AUTO: 2.63 K/UL

## 2018-03-29 PROCEDURE — 36415 COLL VENOUS BLD VENIPUNCTURE: CPT | Mod: PO

## 2018-03-29 PROCEDURE — 99999 PR PBB SHADOW E&M-EST. PATIENT-LVL III: CPT | Mod: PBBFAC,,, | Performed by: NEUROLOGICAL SURGERY

## 2018-03-29 PROCEDURE — 99215 OFFICE O/P EST HI 40 MIN: CPT | Mod: S$PBB,,, | Performed by: NEUROLOGICAL SURGERY

## 2018-03-29 PROCEDURE — 99213 OFFICE O/P EST LOW 20 MIN: CPT | Mod: PBBFAC | Performed by: NEUROLOGICAL SURGERY

## 2018-03-29 PROCEDURE — 80053 COMPREHEN METABOLIC PANEL: CPT

## 2018-03-29 PROCEDURE — 85025 COMPLETE CBC W/AUTO DIFF WBC: CPT

## 2018-03-29 PROCEDURE — 80197 ASSAY OF TACROLIMUS: CPT

## 2018-03-29 RX ORDER — METRONIDAZOLE 500 MG/1
2000 TABLET ORAL ONCE
Qty: 4 TABLET | Refills: 0 | Status: SHIPPED | OUTPATIENT
Start: 2018-03-29 | End: 2018-03-29

## 2018-03-29 RX ORDER — METRONIDAZOLE 500 MG/1
2000 TABLET ORAL ONCE
Qty: 4 TABLET | Refills: 0 | Status: SHIPPED | OUTPATIENT
Start: 2018-03-29 | End: 2018-03-29 | Stop reason: SDUPTHER

## 2018-03-29 NOTE — PROGRESS NOTES
Chief Complaint   Patient presents with    Seizures        Holly Patel is a 27 y.o. female with a history of multiple medical diagnoses as listed below that presents for follow-up after being hospitalized for chest pain.  The patient had seizures while she was in the hospital was consulted to neurology today.  Of note the patient had recently been in the hospital due to uncontrolled blood pressure and has several seizures.  The patient initially was started on Keppra 500 mg twice a day, but when she returned to the hospital for the chest pain episode she had another witnessed seizure there.  She was started on Vimpat at that time.  MRI brain was obtained that showed no acute findings, while there was some concern for vasculitis.  CTA head was obtained at that time which showed no acute changes and no signs of vasculitis on that imaging scan.  Once on Vimpat the patient and no further seizure activity while in the hospital.  She was tolerating the medications well at the time of discharge.  She is accompanied to this visit by her mother.  The patient currently lives alone, but her family member calls come throughout the day.  She has been tolerating Vimpat well without any seizures since she has been discharged from the hospital.    PAST MEDICAL HISTORY:  Past Medical History:   Diagnosis Date    Anemia in ESRD (end-stage renal disease) 10/12/2015    Chronic rejection of liver transplant 3/22/2016    Encounter for blood transfusion     ESRD on hemodialysis 9/30/2015    History of splenomegaly 4/12/2016    Immunosuppressed 8/5/2017    Iron deficiency anemia secondary to inadequate dietary iron intake 8/16/2017    She receives IV iron periodically at the [facility].   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)    Moderate protein-calorie malnutrition 8/16/2017    MRSA bacteremia 8/6/2017    Prophylactic immunotherapy 8/4/2014    Renovascular hypertension  10/2/2015    Secondary hyperparathyroidism 2017    Thrombocytopenia 2016       PAST SURGICAL HISTORY:  Past Surgical History:   Procedure Laterality Date     SECTION      x 2    KIDNEY TRANSPLANT      LIVER BIOPSY      LIVER TRANSPLANT  1992    TUBAL LIGATION         SOCIAL HISTORY:  Social History     Social History    Marital status: Legally      Spouse name: N/A    Number of children: N/A    Years of education: N/A     Occupational History    Not on file.     Social History Main Topics    Smoking status: Never Smoker    Smokeless tobacco: Never Used    Alcohol use No    Drug use: No    Sexual activity: No     Other Topics Concern    Are You Pregnant Or Think You May Be? No    Breast-Feeding No     Social History Narrative    Lives 2 kids, 7 and 8, and nephew. Her mom helps with kids.       FAMILY HISTORY:  Family History   Problem Relation Age of Onset    Hypertension Mother     Hypertension Father     Melanoma Neg Hx     Breast cancer Neg Hx     Colon cancer Neg Hx     Ovarian cancer Neg Hx        ALLERGIES AND MEDICATIONS: updated and reviewed.  Review of patient's allergies indicates:   Allergen Reactions    Chloral hydrate      Other reaction(s): Hallucinations  Other reaction(s): Hives    Hydrocodone Other (See Comments)     Mental status changes     Current Outpatient Prescriptions   Medication Sig Dispense Refill    aspirin 81 MG Chew Take 1 tablet (81 mg total) by mouth once daily.  0    cinacalcet (SENSIPAR) 30 MG Tab Take 1 tablet (30 mg total) by mouth daily with breakfast. 30 tablet 11    cloNIDine 0.2 mg/24 hr td ptwk (CATAPRES) 0.2 mg/24 hr Place 1 patch onto the skin every 7 days. Change every Wednesday 4 patch 11    famotidine (PEPCID) 20 MG tablet Take 1 tablet (20 mg total) by mouth 2 (two) times daily. 20 tablet 0    food supplemt, lactose-reduced (ENSURE ACTIVE HIGH PROTEIN) Liqd Take 236 mLs by mouth 2 (two) times daily. 60 Can 6     hydrALAZINE (APRESOLINE) 50 MG tablet Take 1 tablet (50 mg total) by mouth every 8 (eight) hours. 270 tablet 11    labetalol (NORMODYNE) 200 MG tablet Take 2 tablets (400 mg total) by mouth every 8 (eight) hours. 360 tablet 11    lacosamide (VIMPAT) 50 mg Tab Take 1 tablet (50 mg total) by mouth every 12 (twelve) hours. 60 tablet 0    levETIRAcetam (KEPPRA) 500 MG Tab Take 1 tablet (500 mg total) by mouth 2 (two) times daily. 60 tablet 0    metroNIDAZOLE (FLAGYL) 500 MG tablet Take 4 tablets (2,000 mg total) by mouth once. 4 tablet 0    NIFEdipine (PROCARDIA-XL) 60 MG (OSM) 24 hr tablet Take 1 tablet (60 mg total) by mouth once daily. 30 tablet 0    ondansetron (ZOFRAN) 4 MG tablet Take 1 tablet (4 mg total) by mouth every 6 (six) hours. 12 tablet 0    oxyCODONE (ROXICODONE) 5 MG immediate release tablet Take 1 tablet (5 mg total) by mouth every 4 (four) hours as needed for Pain. 12 tablet 0    predniSONE (DELTASONE) 1 MG tablet Take 1 mg by mouth every other day.      tacrolimus (PROGRAF) 1 MG Cap Take 7 capsules (7 mg total) by mouth every 12 (twelve) hours. 420 capsule 11    triamcinolone acetonide 0.1% (KENALOG) 0.1 % ointment AAA on arms, legs, and neck bid x 1-2 wks then prn flares only (Patient taking differently: Apply to affected area(s) on arms, legs, and neck twice daily x 1-2 wks then as needed for flares only) 80 g 3     No current facility-administered medications for this visit.        Review of Systems   Constitutional: Negative for activity change, appetite change, fever and unexpected weight change.   HENT: Negative for trouble swallowing and voice change.    Eyes: Negative for photophobia and visual disturbance.   Respiratory: Negative for apnea and shortness of breath.    Cardiovascular: Negative for chest pain and leg swelling.   Gastrointestinal: Negative for constipation and nausea.   Genitourinary: Negative for difficulty urinating.   Musculoskeletal: Negative for back pain,  gait problem and neck pain.   Skin: Negative for color change and pallor.   Neurological: Positive for seizures. Negative for dizziness, syncope, weakness and numbness.   Hematological: Negative for adenopathy.   Psychiatric/Behavioral: Negative for agitation, confusion and decreased concentration.       Neurologic Exam     Mental Status   Oriented to person, place, and time.   Registration: recalls 3 of 3 objects.   Attention: normal. Concentration: normal.   Speech: speech is normal   Level of consciousness: alert  Knowledge: good.     Cranial Nerves     CN II   Visual fields full to confrontation.   Right visual field deficit: none  Left visual field deficit: none     CN III, IV, VI   Pupils are equal, round, and reactive to light.  Extraocular motions are normal.   Right pupil: Size: 3 mm. Shape: regular. Accommodation: intact.   Left pupil: Size: 3 mm. Shape: regular. Accommodation: intact.   CN III: no CN III palsy  CN VI: no CN VI palsy  Nystagmus: none   Diplopia: none  Ophthalmoparesis: none  Upgaze: normal  Downgaze: normal  Conjugate gaze: present    CN V   Facial sensation intact.   Right facial sensation deficit: none  Left facial sensation deficit: none    CN VII   Facial expression full, symmetric.   Right facial weakness: none  Left facial weakness: none    CN VIII   CN VIII normal.     CN IX, X   CN IX normal.   CN X normal.   Palate: symmetric    CN XI   CN XI normal.   Right sternocleidomastoid strength: normal  Left sternocleidomastoid strength: normal  Right trapezius strength: normal  Left trapezius strength: normal    CN XII   CN XII normal.   Tongue deviation: none    Motor Exam   Muscle bulk: normal  Overall muscle tone: normal  Right arm tone: normal  Left arm tone: normal  Right leg tone: normal  Left leg tone: normal    Strength   Strength 5/5 throughout.     Sensory Exam   Right arm light touch: normal  Left arm light touch: normal  Right leg light touch: normal  Left leg light touch:  normal  Right arm vibration: normal  Left arm vibration: normal  Right leg vibration: normal  Left leg vibration: normal  Right arm proprioception: normal  Left arm proprioception: normal  Right leg proprioception: normal  Left leg proprioception: normal  Right arm pinprick: normal  Left arm pinprick: normal  Right leg pinprick: normal  Left leg pinprick: normal    Gait, Coordination, and Reflexes     Gait  Gait: normal    Coordination   Romberg: negative  Finger to nose coordination: normal  Heel to shin coordination: normal  Tandem walking coordination: normal    Tremor   Resting tremor: absent    Reflexes   Right brachioradialis: 2+  Left brachioradialis: 2+  Right biceps: 2+  Left biceps: 2+  Right triceps: 2+  Left triceps: 2+  Right patellar: 2+  Left patellar: 2+  Right achilles: 2+  Left achilles: 2+  Right plantar: normal  Left plantar: normal      Physical Exam   Constitutional: She is oriented to person, place, and time. She appears well-developed and well-nourished.   HENT:   Head: Normocephalic and atraumatic.   Eyes: EOM are normal. Pupils are equal, round, and reactive to light.   Neck: Normal range of motion.   Cardiovascular: Normal rate and intact distal pulses.    Pulmonary/Chest: Effort normal. No apnea. No respiratory distress.   Musculoskeletal: Normal range of motion.   Neurological: She is alert and oriented to person, place, and time. She has normal strength. She has a normal Finger-Nose-Finger Test, a normal Heel to Shin Test, a normal Romberg Test and a normal Tandem Gait Test. Gait normal.   Reflex Scores:       Tricep reflexes are 2+ on the right side and 2+ on the left side.       Bicep reflexes are 2+ on the right side and 2+ on the left side.       Brachioradialis reflexes are 2+ on the right side and 2+ on the left side.       Patellar reflexes are 2+ on the right side and 2+ on the left side.       Achilles reflexes are 2+ on the right side and 2+ on the left side.  Skin: Skin is  "warm and dry.   Psychiatric: She has a normal mood and affect. Her speech is normal and behavior is normal. Thought content normal.   Vitals reviewed.      Vitals:    03/29/18 0850   BP: 128/70   BP Location: Right arm   Patient Position: Sitting   BP Method: Large (Automatic)   Pulse: 87   Weight: 53.8 kg (118 lb 9.7 oz)   Height: 5' 5" (1.651 m)       Assessment & Plan:    Problem List Items Addressed This Visit     Renovascular hypertension (Chronic)    Seizures - Primary    Relevant Orders    EEG,w/awake & drowsy record        Seizure precautions discussed including no driving for 6 months per [place] law, no swimming, no bathing unattended, no climbing to heights, no operation of heavy machinery, and abstaining from any activity where loss of consciousness can result in injury to herself or others.    Follow-up: Follow-up in about 3 months (around 6/29/2018).  More than 50% of this 45 minute encounter was spent in counseling and coordinating care.      "

## 2018-03-29 NOTE — LETTER
March 29, 2018      Magdy Harper MD  [address]  [place] [address]           [facility] [address] [facility] [address]  [facility] [address]  Phone: [phone]  Fax: [phone]          Patient: Holly Patel   MR Number: [id number]   YOB: 1990   Date of Visit: 3/29/2018       Dear Dr. Magdy Harper:    Thank you for referring Holly Patel to me for evaluation. Attached you will find relevant portions of my assessment and plan of care.    If you have questions, please do not hesitate to call me. I look forward to following Holly Patel along with you.    Sincerely,    Michael Mendoza MD    Enclosure  CC:  No Recipients    If you would like to receive this communication electronically, please contact [email or web address] or [phone] to request more information on [employer] access.    For providers and/or their staff who would like to refer a patient to [facility], please contact us through our one-stop-shop provider referral line, [facility] [place], at [phone].    If you feel you have received this communication in error or would no longer like to receive these types of communications, please e-mail [email or web address]

## 2018-03-29 NOTE — LETTER
March 29, 2018    Michaelkobetasia Patel  [address] [place] [id number]          Dear Holly Patel:  MRN: [id number]    Your lab results were stable, but your Prograf level remains undetected in your blood stream despite a dose increase.  Compliance with your medications is very important.  Please take your medications as prescribed and have your labs drawn again on 4/23/18.      If you cannot have your labs drawn on the scheduled date, it is your responsibility to call the transplant department to reschedule.  To reschedule or make an appointment, please as to speak to or leave a message for my assistant, Jaki Chavis, at [phone].  When leaving a message for Palmira Pollack, or myself, we ask that you leave a brief message regarding your request.    Sincerely,    Violeta Francis, RN, BSN  Liver Transplant Coordinator  [facility]  [address] [place] [id number]  [phone]

## 2018-03-29 NOTE — TELEPHONE ENCOUNTER
Prograf level undetectable again.    Letter sent, labs stable but prograf remain undetectable. Advised compliance. Repeat labs due 4/23/18.

## 2018-04-05 ENCOUNTER — HOSPITAL ENCOUNTER (OUTPATIENT)
Dept: NEUROLOGY | Facility: HOSPITAL | Age: 28
Discharge: HOME | End: 2018-04-05
Attending: NEUROLOGICAL SURGERY
Payer: MEDICARE

## 2018-04-05 ENCOUNTER — TELEPHONE (OUTPATIENT)
Dept: OBSTETRICS AND GYNECOLOGY | Facility: CLINIC | Age: 28
End: 2018-04-05

## 2018-04-05 DIAGNOSIS — R56.9 SEIZURES: ICD-10-CM

## 2018-04-05 PROCEDURE — 95819 EEG AWAKE AND ASLEEP: CPT | Mod: 26,,, | Performed by: PSYCHIATRY & NEUROLOGY

## 2018-04-05 PROCEDURE — 95951 HC EEG MONITORING/VIDEO RECORD: CPT

## 2018-04-05 PROCEDURE — 95819 PR EEG,W/AWAKE & ASLEEP RECORD: ICD-10-PCS | Mod: 26,,, | Performed by: PSYCHIATRY & NEUROLOGY

## 2018-04-06 NOTE — PROCEDURES
DATE OF PROCEDURE:  04/05/2018.    ELECTROENCEPHALOGRAM REPORT    LOCATION:  [facility].    DURATION:  30 minutes and 9 seconds.    METHODOLOGY:  Electroencephalographic (EEG) recording is recorded with   electrodes placed according to the International 10-20 placement system.  Thirty   two (32) channels of digital signal (sampling rate of 512/sec), including T1   and T2, were simultaneously recorded from the scalp and may include EKG, EMG,   and/or eye monitors.  Recording band pass was 0.1 to 512 Hz.  Digital video   recording of the patient is simultaneously recorded with the EEG.  The patient   is instructed to report clinical symptoms which may occur during the recording   session.  EEG and video recording are stored and archived in digital format.    Activation procedures, which include photic stimulation, hyperventilation and   instructing patients to perform simple tasks, are done in selected patients  The EEG is displayed on a monitor screen and can be reviewed using different   montages.  Computer assisted-analysis is employed to detect spike and   electrographic seizure activity.   The entire record is submitted for computer   analysis.  The entire recording is visually reviewed, and the times identified   by computer analysis as being spikes or seizures are reviewed again.    Compressed spectral analysis (CSA) is also performed on the activity recorded   from each individual channel.  This is displayed as a power display of   frequencies from 0 to 30 Hz over time.   The CSA is reviewed looking for   asymmetries in power between homologous areas of the scalp, then compared with   the original EEG recording.    Cognitive Match software was also utilized in the review of this study.  This software   suite analyzes the EEG recording in multiple domains.  Coherence and rhythmicity   are computed to identify EEG sections which may contain organized seizures.    Each channel undergoes analysis to detect the  presence of spike and sharp waves   which have special and morphological characteristics of epileptic activity.  The   routine EEG recording is converted from special into frequency domain.  This is   then displayed comparing homologous areas to identify areas of significant   asymmetry.  Algorithm to identify non-cortically generated artifact is used to   separate artifact from the EEG.    EEG FINDINGS:  This study reveals high amplitude activity with an appropriately   fluctuating mix of primarily alpha and beta activity seen during wakefulness.    There is an 8 Hz posterior dominant rhythm seen initially in the occipital   regions, after which the patient becomes drowsy with high amplitude central   vertex waves and K complexes immediately noted in runs at times.  There is also   a portion of stage N3 sleep with high amplitude delta activity seen during this   study.  There is a brief arousal at the very end of the study with a return to   awaking baseline and a brief record of 10 Hz posterior dominant rhythm.    There was high amplitude central vertex activity, but no epileptiform discharges   noted.  There were no focal findings and no seizures.    INTERPRETATION:  Normal, mostly drowsy and asleep EEG.      NBB/IN  dd: 04/06/2018 10:27:27 (CDT)  td: 04/06/2018 16:25:26 (CDT)  Doc ID   #[id number]  Job ID #[id number]    CC:

## 2018-04-10 ENCOUNTER — TELEPHONE (OUTPATIENT)
Dept: OBSTETRICS AND GYNECOLOGY | Facility: CLINIC | Age: 28
End: 2018-04-10

## 2018-04-10 ENCOUNTER — PATIENT MESSAGE (OUTPATIENT)
Dept: OBSTETRICS AND GYNECOLOGY | Facility: CLINIC | Age: 28
End: 2018-04-10

## 2018-04-10 LAB
HPV16 AG SPEC QL: NEGATIVE
HPV16+18+H RISK 12 DNA CVX-IMP: POSITIVE
HPV18 DNA SPEC QL NAA+PROBE: NEGATIVE

## 2018-04-12 ENCOUNTER — TELEPHONE (OUTPATIENT)
Dept: NEPHROLOGY | Facility: CLINIC | Age: 28
End: 2018-04-12

## 2018-04-12 ENCOUNTER — HOSPITAL ENCOUNTER (EMERGENCY)
Facility: OTHER | Age: 28
Discharge: HOME | End: 2018-04-12
Attending: EMERGENCY MEDICINE
Payer: MEDICARE

## 2018-04-12 VITALS
TEMPERATURE: 98 F | WEIGHT: 116 LBS | SYSTOLIC BLOOD PRESSURE: 130 MMHG | HEART RATE: 92 BPM | DIASTOLIC BLOOD PRESSURE: 84 MMHG | RESPIRATION RATE: 18 BRPM | OXYGEN SATURATION: 98 % | HEIGHT: 65 IN | BODY MASS INDEX: 19.33 KG/M2

## 2018-04-12 DIAGNOSIS — Z99.2 ESRD (END STAGE RENAL DISEASE) ON DIALYSIS: ICD-10-CM

## 2018-04-12 DIAGNOSIS — N18.6 ESRD (END STAGE RENAL DISEASE) ON DIALYSIS: ICD-10-CM

## 2018-04-12 DIAGNOSIS — Z99.2 ESRD (END STAGE RENAL DISEASE) ON DIALYSIS: Primary | ICD-10-CM

## 2018-04-12 DIAGNOSIS — N18.6 ESRD (END STAGE RENAL DISEASE) ON DIALYSIS: Primary | ICD-10-CM

## 2018-04-12 DIAGNOSIS — Z98.890 HISTORY OF VASCULAR ACCESS DEVICE: Primary | ICD-10-CM

## 2018-04-12 DIAGNOSIS — D69.6 THROMBOCYTOPENIA: ICD-10-CM

## 2018-04-12 LAB
ALBUMIN SERPL-MCNC: 2.8 G/DL (ref 3.3–5.5)
ALP SERPL-CCNC: 481 U/L (ref 42–141)
B-HCG UR QL: NEGATIVE
BILIRUB SERPL-MCNC: 0.6 MG/DL (ref 0.2–1.6)
BUN SERPL-MCNC: 28 MG/DL (ref 7–22)
CALCIUM SERPL-MCNC: 9.6 MG/DL (ref 8–10.3)
CHLORIDE SERPL-SCNC: 94 MMOL/L (ref 98–108)
CREAT SERPL-MCNC: 7.4 MG/DL (ref 0.6–1.2)
CTP QC/QA: YES
GLUCOSE SERPL-MCNC: 95 MG/DL (ref 73–118)
POC ALT (SGPT): 27 U/L (ref 10–47)
POC AST (SGOT): 45 U/L (ref 11–38)
POC TCO2: 30 MMOL/L (ref 18–33)
POTASSIUM BLD-SCNC: 4.1 MMOL/L (ref 3.6–5.1)
PROTEIN, POC: 7.5 G/DL (ref 6.4–8.1)
SODIUM BLD-SCNC: 139 MMOL/L (ref 128–145)

## 2018-04-12 PROCEDURE — 85025 COMPLETE CBC W/AUTO DIFF WBC: CPT

## 2018-04-12 PROCEDURE — 81025 URINE PREGNANCY TEST: CPT | Performed by: EMERGENCY MEDICINE

## 2018-04-12 PROCEDURE — 99284 EMERGENCY DEPT VISIT MOD MDM: CPT | Mod: 25

## 2018-04-12 PROCEDURE — 80053 COMPREHEN METABOLIC PANEL: CPT

## 2018-04-12 NOTE — ED PROVIDER NOTES
"Encounter Date: 2018       History     Chief Complaint   Patient presents with    Vascular Access Problem     pt states "I was at dialysis this morning, pus was coming from my fistula so they sent told me to come to an ER" Fistula noted in left arm. no drainage noted at this time. Pt denies any pain     Chief complaint: Pus from fistula site  27-year-old said that she went to dialysis this morning and when her fistula was  Accessed, pus came from the site.  Patient says that she does have bumps to her skin secondary to tape.  She said that the pus came from the small areas.  She denies fever.  She has not noticed swelling, warmth, redness or tenderness to the forearm.  0 out of 10 pain      The history is provided by the patient.     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 ()    Moderate protein-calorie malnutrition 2017    MRSA bacteremia 2017    Prophylactic immunotherapy 2014    Renovascular hypertension 10/2/2015    Secondary hyperparathyroidism 2017    Thrombocytopenia 2016     Past Surgical History:   Procedure Laterality Date     SECTION      x 2    KIDNEY TRANSPLANT      LIVER BIOPSY      LIVER TRANSPLANT  1992    TUBAL LIGATION  2010     Family History   Problem Relation Age of Onset    " Hypertension Mother     Hypertension Father     Melanoma Neg Hx     Breast cancer Neg Hx     Colon cancer Neg Hx     Ovarian cancer Neg Hx      Social History   Substance Use Topics    Smoking status: Never Smoker    Smokeless tobacco: Never Used    Alcohol use No     Review of Systems   Constitutional: Negative for fever.   Respiratory: Negative for shortness of breath.    Cardiovascular: Negative for chest pain.   Musculoskeletal: Negative for joint swelling.   Skin: Negative for color change.   All other systems reviewed and are negative.      Physical Exam     Initial Vitals [04/12/18 0737]   BP Pulse Resp Temp SpO2   133/89 90 18 97.8 °F (36.6 °C) 100 %      MAP       103.67         Physical Exam    Nursing note and vitals reviewed.  Constitutional: She appears cachectic.   HENT:   Head: Normocephalic and atraumatic.   Eyes: Conjunctivae and EOM are normal. Pupils are equal, round, and reactive to light.   Neck: Normal range of motion. Neck supple.   Cardiovascular: Normal rate and regular rhythm.   Pulmonary/Chest: Breath sounds normal.   Abdominal: Soft. There is no tenderness. There is no rebound and no guarding.   Musculoskeletal: Normal range of motion.        Arms:  Neurological: She is alert and oriented to person, place, and time. She has normal strength.   Skin: Skin is warm and dry.   Psychiatric: She has a normal mood and affect.         ED Course   Procedures  Labs Reviewed   POCT URINE PREGNANCY   POCT CBC   POCT CMP             Medical Decision Making:   Initial Assessment:   27-year-old dialysis patient complains of pus drainage from her fistula site.  Patient was at dialysis and this was noted.  She was told to go to the emergency room.  On exam patient has no evidence of infection to the arm.  ED Management:  CBC, CMP will be done.  Will discuss with her nephrologist.    Discussed with - his office will call her vascular surgeon today for follow-up today or tomorrow.  Patient  does not need emergent dialysis.  Labs were significant for thrombocytopenia which is old as well as elevated alkaline phosphatase which is old as well.  Reports patient has renal failure which is chronic.                      Clinical Impression:   The primary encounter diagnosis was History of vascular access device. Diagnoses of ESRD (end stage renal disease) on dialysis and Thrombocytopenia were also pertinent to this visit.                           Nydia Muse MD  04/12/18 6022

## 2018-04-12 NOTE — TELEPHONE ENCOUNTER
----- Message from Delma Astorga sent at 4/12/2018  8:52 AM CDT -----  Contact: Dr. Petty /  [phone]  Dr. Petty at [facility] called to speak with Dr. Bass concerning the PT. He can be reached at [phone].    Called ED to speak with Dr. Petty phone staff placed me on hold and no one never came to phone

## 2018-04-12 NOTE — DISCHARGE INSTRUCTIONS
DR. GONZALES'S OFFICE WILL CONTACT YOUR VASCULAR SURGEON TODAY. EXPECT A PHONE CALL REGARDING CLOSE APPOINTMENT

## 2018-04-12 NOTE — ED NOTES
"Neuro: AAOx3  Resp: Airway patent, respirations even/unlabored, denies SOB  Cardiac: Skin pink/warm/dry, pulses intact, denies chest pain  Abdomen: Soft, non-tender to palpation, denies N/V/D  Musculoskeletal: Moves all extremities equally, ROM intact  Av fistula left forearm, no redness, no edema noted. Pt reports "pus" came out of injection sites on fistula when attempting to have it accessed this AM  "

## 2018-04-14 ENCOUNTER — HOSPITAL ENCOUNTER (INPATIENT)
Facility: HOSPITAL | Age: 28
LOS: 2 days | Discharge: HOME HEALTH | DRG: 304 | End: 2018-04-16
Attending: HOSPITALIST | Admitting: HOSPITALIST
Payer: MEDICARE

## 2018-04-14 ENCOUNTER — HOSPITAL ENCOUNTER (EMERGENCY)
Facility: OTHER | Age: 28
Discharge: ANOTHER HOSPITAL | End: 2018-04-14
Attending: EMERGENCY MEDICINE
Payer: MEDICARE

## 2018-04-14 VITALS
HEART RATE: 105 BPM | DIASTOLIC BLOOD PRESSURE: 131 MMHG | HEIGHT: 65 IN | RESPIRATION RATE: 18 BRPM | SYSTOLIC BLOOD PRESSURE: 195 MMHG | BODY MASS INDEX: 20.43 KG/M2 | OXYGEN SATURATION: 93 % | WEIGHT: 122.63 LBS | TEMPERATURE: 98 F

## 2018-04-14 DIAGNOSIS — D63.1 ANEMIA IN ESRD (END-STAGE RENAL DISEASE): Chronic | ICD-10-CM

## 2018-04-14 DIAGNOSIS — I10 UNCONTROLLED HYPERTENSION: ICD-10-CM

## 2018-04-14 DIAGNOSIS — I15.0 RENOVASCULAR HYPERTENSION: Chronic | ICD-10-CM

## 2018-04-14 DIAGNOSIS — N25.81 SECONDARY HYPERPARATHYROIDISM: ICD-10-CM

## 2018-04-14 DIAGNOSIS — N18.6 ANEMIA IN ESRD (END-STAGE RENAL DISEASE): Chronic | ICD-10-CM

## 2018-04-14 DIAGNOSIS — D61.818 PANCYTOPENIA: ICD-10-CM

## 2018-04-14 DIAGNOSIS — E44.0 MODERATE PROTEIN-CALORIE MALNUTRITION: ICD-10-CM

## 2018-04-14 DIAGNOSIS — N18.6 ESRD (END STAGE RENAL DISEASE): ICD-10-CM

## 2018-04-14 DIAGNOSIS — I10 ESSENTIAL HYPERTENSION: ICD-10-CM

## 2018-04-14 DIAGNOSIS — R56.9 SEIZURES: ICD-10-CM

## 2018-04-14 DIAGNOSIS — Z94.4 LIVER REPLACED BY TRANSPLANT: Chronic | ICD-10-CM

## 2018-04-14 DIAGNOSIS — Z99.2 ESRD ON HEMODIALYSIS: Chronic | ICD-10-CM

## 2018-04-14 DIAGNOSIS — D64.9 SYMPTOMATIC ANEMIA: Primary | ICD-10-CM

## 2018-04-14 DIAGNOSIS — I16.1 HYPERTENSIVE EMERGENCY: ICD-10-CM

## 2018-04-14 DIAGNOSIS — R06.02 SHORTNESS OF BREATH: ICD-10-CM

## 2018-04-14 DIAGNOSIS — N18.6 ESRD ON HEMODIALYSIS: Chronic | ICD-10-CM

## 2018-04-14 DIAGNOSIS — I16.1 HYPERTENSIVE EMERGENCY: Primary | ICD-10-CM

## 2018-04-14 LAB
ABO + RH BLD: NORMAL
ALBUMIN SERPL BCP-MCNC: 2.8 G/DL
ALBUMIN SERPL-MCNC: 2.8 G/DL (ref 3.3–5.5)
ALP SERPL-CCNC: 521 U/L (ref 42–141)
ALP SERPL-CCNC: 618 U/L
ALT SERPL W/O P-5'-P-CCNC: 28 U/L
ANION GAP SERPL CALC-SCNC: 14 MMOL/L
AST SERPL-CCNC: 35 U/L
BASOPHILS # BLD AUTO: 0.01 K/UL
BASOPHILS NFR BLD: 0.3 %
BILIRUB SERPL-MCNC: 0.5 MG/DL (ref 0.2–1.6)
BILIRUB SERPL-MCNC: 0.6 MG/DL
BLD GP AB SCN CELLS X3 SERPL QL: NORMAL
BUN SERPL-MCNC: 53 MG/DL (ref 7–22)
BUN SERPL-MCNC: 57 MG/DL
CALCIUM SERPL-MCNC: 9.4 MG/DL
CALCIUM SERPL-MCNC: 9.6 MG/DL (ref 8–10.3)
CHLORIDE SERPL-SCNC: 95 MMOL/L (ref 98–108)
CHLORIDE SERPL-SCNC: 99 MMOL/L
CO2 SERPL-SCNC: 27 MMOL/L
CREAT SERPL-MCNC: 10.6 MG/DL (ref 0.6–1.2)
CREAT SERPL-MCNC: 11 MG/DL
DIFFERENTIAL METHOD: ABNORMAL
EOSINOPHIL # BLD AUTO: 0.3 K/UL
EOSINOPHIL NFR BLD: 10.5 %
ERYTHROCYTE [DISTWIDTH] IN BLOOD BY AUTOMATED COUNT: 18.6 %
EST. GFR  (AFRICAN AMERICAN): 5 ML/MIN/1.73 M^2
EST. GFR  (NON AFRICAN AMERICAN): 4 ML/MIN/1.73 M^2
GLUCOSE SERPL-MCNC: 104 MG/DL (ref 73–118)
GLUCOSE SERPL-MCNC: 98 MG/DL
HCT VFR BLD AUTO: 22.6 %
HGB BLD-MCNC: 7.4 G/DL
LYMPHOCYTES # BLD AUTO: 0.5 K/UL
LYMPHOCYTES NFR BLD: 15.2 %
MAGNESIUM SERPL-MCNC: 2.5 MG/DL
MCH RBC QN AUTO: 26.3 PG
MCHC RBC AUTO-ENTMCNC: 32.7 G/DL
MCV RBC AUTO: 80 FL
MONOCYTES # BLD AUTO: 0.1 K/UL
MONOCYTES NFR BLD: 4.3 %
NEUTROPHILS # BLD AUTO: 2.2 K/UL
NEUTROPHILS NFR BLD: 69.4 %
PHOSPHATE SERPL-MCNC: 6.1 MG/DL
PLATELET # BLD AUTO: 80 K/UL
PMV BLD AUTO: 10 FL
POC ALT (SGPT): 31 U/L (ref 10–47)
POC AST (SGOT): 47 U/L (ref 11–38)
POC B-TYPE NATRIURETIC PEPTIDE: 1670 PG/ML (ref 0–100)
POC BETA-HCG (QUANT): <5 IU/L
POC CARDIAC TROPONIN I: 0.02 NG/ML
POC D-DI: 2830 NG/ML (ref 0–450)
POC TCO2: 26 MMOL/L (ref 18–33)
POTASSIUM BLD-SCNC: 4.2 MMOL/L (ref 3.6–5.1)
POTASSIUM SERPL-SCNC: 4.9 MMOL/L
PROT SERPL-MCNC: 7.9 G/DL
PROTEIN, POC: 7.7 G/DL (ref 6.4–8.1)
RBC # BLD AUTO: 2.81 M/UL
SAMPLE: NORMAL
SAMPLE: NORMAL
SODIUM BLD-SCNC: 144 MMOL/L (ref 128–145)
SODIUM SERPL-SCNC: 140 MMOL/L
WBC # BLD AUTO: 3.23 K/UL

## 2018-04-14 PROCEDURE — 25000003 PHARM REV CODE 250: Performed by: EMERGENCY MEDICINE

## 2018-04-14 PROCEDURE — 84702 CHORIONIC GONADOTROPIN TEST: CPT

## 2018-04-14 PROCEDURE — 85025 COMPLETE CBC W/AUTO DIFF WBC: CPT

## 2018-04-14 PROCEDURE — 93005 ELECTROCARDIOGRAM TRACING: CPT

## 2018-04-14 PROCEDURE — 84484 ASSAY OF TROPONIN QUANT: CPT

## 2018-04-14 PROCEDURE — 83880 ASSAY OF NATRIURETIC PEPTIDE: CPT

## 2018-04-14 PROCEDURE — 83735 ASSAY OF MAGNESIUM: CPT

## 2018-04-14 PROCEDURE — 85379 FIBRIN DEGRADATION QUANT: CPT

## 2018-04-14 PROCEDURE — 20000000 HC ICU ROOM

## 2018-04-14 PROCEDURE — 90935 HEMODIALYSIS ONE EVALUATION: CPT

## 2018-04-14 PROCEDURE — 80053 COMPREHEN METABOLIC PANEL: CPT

## 2018-04-14 PROCEDURE — 99285 EMERGENCY DEPT VISIT HI MDM: CPT | Mod: 25

## 2018-04-14 PROCEDURE — 96365 THER/PROPH/DIAG IV INF INIT: CPT

## 2018-04-14 PROCEDURE — 93010 ELECTROCARDIOGRAM REPORT: CPT | Mod: ,,, | Performed by: INTERNAL MEDICINE

## 2018-04-14 PROCEDURE — 25000003 PHARM REV CODE 250: Performed by: HOSPITALIST

## 2018-04-14 PROCEDURE — 36415 COLL VENOUS BLD VENIPUNCTURE: CPT

## 2018-04-14 PROCEDURE — 84100 ASSAY OF PHOSPHORUS: CPT

## 2018-04-14 PROCEDURE — 86920 COMPATIBILITY TEST SPIN: CPT

## 2018-04-14 PROCEDURE — 86850 RBC ANTIBODY SCREEN: CPT

## 2018-04-14 RX ORDER — HYDROCODONE BITARTRATE AND ACETAMINOPHEN 500; 5 MG/1; MG/1
TABLET ORAL
Status: DISCONTINUED | OUTPATIENT
Start: 2018-04-14 | End: 2018-04-16 | Stop reason: HOSPADM

## 2018-04-14 RX ORDER — ACETAMINOPHEN 325 MG/1
650 TABLET ORAL EVERY 6 HOURS PRN
Status: DISCONTINUED | OUTPATIENT
Start: 2018-04-14 | End: 2018-04-16 | Stop reason: HOSPADM

## 2018-04-14 RX ORDER — GLUCAGON 1 MG
1 KIT INJECTION
Status: DISCONTINUED | OUTPATIENT
Start: 2018-04-14 | End: 2018-04-16 | Stop reason: HOSPADM

## 2018-04-14 RX ORDER — SODIUM CHLORIDE 9 MG/ML
INJECTION, SOLUTION INTRAVENOUS ONCE
Status: DISCONTINUED | OUTPATIENT
Start: 2018-04-14 | End: 2018-04-16 | Stop reason: HOSPADM

## 2018-04-14 RX ORDER — NICARDIPINE HYDROCHLORIDE 0.2 MG/ML
1 INJECTION INTRAVENOUS CONTINUOUS
Status: DISCONTINUED | OUTPATIENT
Start: 2018-04-14 | End: 2018-04-15

## 2018-04-14 RX ORDER — CLONIDINE 0.2 MG/24H
1 PATCH, EXTENDED RELEASE TRANSDERMAL
Status: DISCONTINUED | OUTPATIENT
Start: 2018-04-14 | End: 2018-04-16 | Stop reason: HOSPADM

## 2018-04-14 RX ORDER — FAMOTIDINE 20 MG/1
20 TABLET, FILM COATED ORAL 2 TIMES DAILY
Status: DISCONTINUED | OUTPATIENT
Start: 2018-04-14 | End: 2018-04-14

## 2018-04-14 RX ORDER — ALBUMIN HUMAN 250 G/1000ML
25 SOLUTION INTRAVENOUS
Status: DISCONTINUED | OUTPATIENT
Start: 2018-04-14 | End: 2018-04-16 | Stop reason: HOSPADM

## 2018-04-14 RX ORDER — IBUPROFEN 200 MG
16 TABLET ORAL
Status: DISCONTINUED | OUTPATIENT
Start: 2018-04-14 | End: 2018-04-16 | Stop reason: HOSPADM

## 2018-04-14 RX ORDER — LEVETIRACETAM 500 MG/1
500 TABLET ORAL 2 TIMES DAILY
Status: DISCONTINUED | OUTPATIENT
Start: 2018-04-14 | End: 2018-04-16 | Stop reason: HOSPADM

## 2018-04-14 RX ORDER — IBUPROFEN 200 MG
24 TABLET ORAL
Status: DISCONTINUED | OUTPATIENT
Start: 2018-04-14 | End: 2018-04-16 | Stop reason: HOSPADM

## 2018-04-14 RX ORDER — SODIUM CHLORIDE 9 MG/ML
INJECTION, SOLUTION INTRAVENOUS
Status: DISCONTINUED | OUTPATIENT
Start: 2018-04-14 | End: 2018-04-16 | Stop reason: HOSPADM

## 2018-04-14 RX ORDER — FAMOTIDINE 20 MG/1
20 TABLET, FILM COATED ORAL DAILY
Status: DISCONTINUED | OUTPATIENT
Start: 2018-04-14 | End: 2018-04-16 | Stop reason: HOSPADM

## 2018-04-14 RX ORDER — LACOSAMIDE 50 MG/1
50 TABLET ORAL EVERY 12 HOURS
Status: DISCONTINUED | OUTPATIENT
Start: 2018-04-14 | End: 2018-04-16 | Stop reason: HOSPADM

## 2018-04-14 RX ORDER — SODIUM CHLORIDE 0.9 % (FLUSH) 0.9 %
5 SYRINGE (ML) INJECTION
Status: DISCONTINUED | OUTPATIENT
Start: 2018-04-14 | End: 2018-04-16 | Stop reason: HOSPADM

## 2018-04-14 RX ORDER — HYDRALAZINE HYDROCHLORIDE 25 MG/1
50 TABLET, FILM COATED ORAL
Status: COMPLETED | OUTPATIENT
Start: 2018-04-14 | End: 2018-04-14

## 2018-04-14 RX ORDER — NICARDIPINE HYDROCHLORIDE 0.2 MG/ML
2.5 INJECTION INTRAVENOUS CONTINUOUS
Status: DISCONTINUED | OUTPATIENT
Start: 2018-04-14 | End: 2018-04-14 | Stop reason: HOSPADM

## 2018-04-14 RX ORDER — CINACALCET 30 MG/1
30 TABLET, FILM COATED ORAL
Status: DISCONTINUED | OUTPATIENT
Start: 2018-04-15 | End: 2018-04-16 | Stop reason: HOSPADM

## 2018-04-14 RX ORDER — CLONIDINE HYDROCHLORIDE 0.1 MG/1
0.2 TABLET ORAL
Status: COMPLETED | OUTPATIENT
Start: 2018-04-14 | End: 2018-04-14

## 2018-04-14 RX ORDER — PREDNISONE 1 MG/1
1 TABLET ORAL EVERY OTHER DAY
Status: DISCONTINUED | OUTPATIENT
Start: 2018-04-15 | End: 2018-04-16 | Stop reason: HOSPADM

## 2018-04-14 RX ADMIN — CLONIDINE 1 PATCH: 0.2 PATCH TRANSDERMAL at 12:04

## 2018-04-14 RX ADMIN — LEVETIRACETAM 500 MG: 500 TABLET, FILM COATED ORAL at 12:04

## 2018-04-14 RX ADMIN — NICARDIPINE HYDROCHLORIDE 2.5 MG/HR: 0.2 INJECTION, SOLUTION INTRAVENOUS at 03:04

## 2018-04-14 RX ADMIN — NICARDIPINE HYDROCHLORIDE 2.5 MG/HR: 0.2 INJECTION, SOLUTION INTRAVENOUS at 08:04

## 2018-04-14 RX ADMIN — FAMOTIDINE 20 MG: 20 TABLET, FILM COATED ORAL at 12:04

## 2018-04-14 RX ADMIN — HYDRALAZINE HYDROCHLORIDE 50 MG: 25 TABLET, FILM COATED ORAL at 03:04

## 2018-04-14 RX ADMIN — LACOSAMIDE 50 MG: 50 TABLET, FILM COATED ORAL at 10:04

## 2018-04-14 RX ADMIN — ACETAMINOPHEN 650 MG: 325 TABLET ORAL at 12:04

## 2018-04-14 RX ADMIN — LEVETIRACETAM 500 MG: 500 TABLET, FILM COATED ORAL at 10:04

## 2018-04-14 RX ADMIN — NICARDIPINE HYDROCHLORIDE 2.5 MG/HR: 0.2 INJECTION, SOLUTION INTRAVENOUS at 04:04

## 2018-04-14 RX ADMIN — LACOSAMIDE 50 MG: 50 TABLET, FILM COATED ORAL at 12:04

## 2018-04-14 RX ADMIN — CLONIDINE HYDROCHLORIDE 0.2 MG: 0.1 TABLET ORAL at 03:04

## 2018-04-14 NOTE — ED NOTES
Care assumed per baljeet ems with piv 20g in tact to right hand with cardine infusing nrti noted, pt 3lpm via nasal cannula and continuous pulse ox and monitor, pt aaox3, understands plan of care. Discussed with emt p on unit 372 with baljeet shepherd drip due to for titration and to be titrated [id number] 2.5mg with max of 15mg to maintain map below 130. nad stable for transport to icu [facility] 270, report give to ana fitch at [phone]

## 2018-04-14 NOTE — PROGRESS NOTES
Call out for emergent HD tx , new admit to ICU, but not new to dialysis. Pt presents as a 26 y/o B/F whom arrived as an ER to icu admit for evaluation of her dialysis AV Fistula. Reports that her EDW 52.5Kg. Recently had an episode that resulted in some serous pus like discharge coming from her access. Last dialyzed on 4/10/18, reason being that pt did not feel comfortable with using her access for hd with d/c from her site. . Pt arrived to satellite ED for SOB, then transferred to B. For treatment and eval. Pt assessed, Awake alert and oriented x person , place , time and event. , GCS @ 15, at present has no somatic complaints. Consulted to Renal svc for Dr. Rosado. Left UE AVF accessed according to P&P, good blood return, easily flushed. All connections attached and secured. 4 hour intermittent hemodialysis initiated and is currently in progress. Pt is showing SR on monitor. Planned UF for today = 4L. Will plan to continue to monitor for changes and trends.

## 2018-04-14 NOTE — ED PROVIDER NOTES
Encounter Date: 2018       History     Chief Complaint   Patient presents with    Shortness of Breath     Pt is a dialysis pt with sudden onset of SOB tonigh. States she could not sleep. She was unable to get dialysis Thursday R/T pus in her AV shunt. Normal dry wt is 52 kg     27 y.o. Female with multiple medical problems including end-stage renal disease on hemodialysis (Tuesday, Thursday, Saturday-missed Thursday dialysis due to reported purulent drainage from dialysis catheter) presents to emergency department complaining of acute shortness of breath that began around 9:30 PM patient states she was laying in bed when symptoms began.  She denies cough, hemoptysis, leg pain/swelling, the vertigo, dizziness, chest pain or syncope.      The history is provided by the patient.     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 ()    Moderate protein-calorie malnutrition 2017    MRSA bacteremia 2017    Prophylactic immunotherapy 2014    Renovascular hypertension 10/2/2015    Secondary hyperparathyroidism 2017    Sialadenitis 3/21/2018    Thrombocytopenia 2016    Thrombocytopenia 2016     Past Surgical History:   Procedure Laterality Date     SECTION      x 2    LIVER BIOPSY      LIVER TRANSPLANT  1992     TUBAL LIGATION  2010     Family History   Problem Relation Age of Onset    Hypertension Mother     Hypertension Father     Melanoma Neg Hx     Breast cancer Neg Hx     Colon cancer Neg Hx     Ovarian cancer Neg Hx      Social History   Substance Use Topics    Smoking status: Never Smoker    Smokeless tobacco: Never Used    Alcohol use No     Review of Systems   Constitutional: Negative for appetite change and fever.   HENT: Negative for congestion.    Respiratory: Positive for shortness of breath. Negative for cough and wheezing.    Cardiovascular: Negative for chest pain, palpitations and leg swelling.   Gastrointestinal: Negative for abdominal pain, diarrhea, nausea and vomiting.   Genitourinary: Negative for dysuria and vaginal discharge.   Neurological: Negative for dizziness, weakness and headaches.   All other systems reviewed and are negative.      Physical Exam     Initial Vitals [04/14/18 0139]   BP Pulse Resp Temp SpO2   (!) 220/148 101 20 98.4 °F (36.9 °C) 97 %      MAP       172         Physical Exam    Nursing note and vitals reviewed.  Constitutional: She appears well-developed and well-nourished. She is not diaphoretic. No distress.   HENT:   Head: Normocephalic and atraumatic.   Mouth/Throat: Oropharynx is clear and moist.   Eyes: Conjunctivae are normal. Pupils are equal, round, and reactive to light.   Neck: Normal range of motion. Neck supple.   Cardiovascular: Regular rhythm and intact distal pulses. Tachycardia present.    No murmur heard.  Pulmonary/Chest: No accessory muscle usage or stridor. No tachypnea. No respiratory distress.   Abdominal: Soft. Bowel sounds are normal. She exhibits no distension. There is no tenderness. There is no rebound and no guarding.   Musculoskeletal: Normal range of motion. She exhibits no tenderness.   Neurological: She is alert and oriented to person, place, and time.   Skin: Skin is warm. Capillary refill takes less than 2 seconds.   Psychiatric:  She has a normal mood and affect.         ED Course   Procedures  Labs Reviewed   POCT CMP - Abnormal; Notable for the following:        Result Value    Albumin, POC 2.8 (*)     Alkaline Phosphatase,  (*)     AST (SGOT), POC 47 (*)     POC BUN 53 (*)     POC Chloride 95 (*)     POC Creatinine 10.6 (*)     All other components within normal limits   POCT B-TYPE NATRIURETIC PEPTIDE (BNP) - Abnormal; Notable for the following:     POC B-Type Natriuretic Peptide 1670 (*)     All other components within normal limits   POCT D DIMER - Abnormal; Notable for the following:     POC D-DI 2830 (*)     All other components within normal limits   TROPONIN ISTAT   POCT CBC   POCT CMP   POCT TROPONIN   POCT B-TYPE NATRIURETIC PEPTIDE (BNP)   POCT B-HCG (QUANT)   POCT D DIMER   POCT B-HCG (QUANT)             Medical Decision Making:   Clinical Tests:   Lab Tests: Ordered and Reviewed       <> Summary of Lab: White count 3.2 H&H 5.9 and 17.2 platelets 62 MCV 77.6 RDW 16.6      Results for SYEDA BANKS (MRN [id number]) as of 4/14/2018 02:12   Ref. Range 3/1/2018 04:45 3/21/2018 09:35 3/29/2018 09:43 4/12/2018 18:30   WBC Latest Ref Range: 3.90 - 12.70 K/uL 4.44 3.11 (L) 2.63 (L) 2.44 (L)   RBC Latest Ref Range: 4.00 - 5.40 M/uL 3.58 (L) 2.96 (L) 2.90 (L) 2.91 (L)   Hemoglobin Latest Ref Range: 12.0 - 16.0 g/dL 9.0 (L) 7.7 (L) 7.5 (L) 7.6 (L)   Hematocrit Latest Ref Range: 37.0 - 48.5 % 26.9 (L) 24.2 (L) 23.9 (L) 24.1 (L)   MCV Latest Ref Range: 82 - 98 fL 75 (L) 82 82 83   MCH Latest Ref Range: 27.0 - 31.0 pg 25.1 (L) 26.0 (L) 25.9 (L) 26.1 (L)   MCHC Latest Ref Range: 32.0 - 36.0 g/dL 33.5 31.8 (L) 31.4 (L) 31.5 (L)   RDW Latest Ref Range: 11.5 - 14.5 % 17.9 (H) 18.8 (H) 19.3 (H) 18.8 (H)   Platelets Latest Ref Range: 150 - 350 K/uL 80 (L) 81 (L) 65 (L) 75 (L)                  Labs Reviewed  Admission on 04/14/2018, Discharged on 04/16/2018   Component Date Value Ref Range Status    Sodium 04/14/2018 140  136 - 145  mmol/L Final    Potassium 04/14/2018 4.9  3.5 - 5.1 mmol/L Final    Chloride 04/14/2018 99  95 - 110 mmol/L Final    CO2 04/14/2018 27  23 - 29 mmol/L Final    Glucose 04/14/2018 98  70 - 110 mg/dL Final    BUN, Bld 04/14/2018 57* 6 - 20 mg/dL Final    Creatinine 04/14/2018 11.0* 0.5 - 1.4 mg/dL Final    Calcium 04/14/2018 9.4  8.7 - 10.5 mg/dL Final    Total Protein 04/14/2018 7.9  6.0 - 8.4 g/dL Final    Albumin 04/14/2018 2.8* 3.5 - 5.2 g/dL Final    Total Bilirubin 04/14/2018 0.6  0.1 - 1.0 mg/dL Final    Comment: For infants and newborns, interpretation of results should be based  on gestational age, weight and in agreement with clinical  observations.  Premature Infant recommended reference ranges:  Up to 24 hours.............<8.0 mg/dL  Up to 48 hours............<12.0 mg/dL  3-5 days..................<15.0 mg/dL  6-29 days.................<15.0 mg/dL      Alkaline Phosphatase 04/14/2018 618* 55 - 135 U/L Final    AST 04/14/2018 35  10 - 40 U/L Final    ALT 04/14/2018 28  10 - 44 U/L Final    Anion Gap 04/14/2018 14  8 - 16 mmol/L Final    eGFR if African American 04/14/2018 5* >60 mL/min/1.73 m^2 Final    eGFR if non African American 04/14/2018 4* >60 mL/min/1.73 m^2 Final    Comment: Calculation used to obtain the estimated glomerular filtration  rate (eGFR) is the CKD-EPI equation.       WBC 04/14/2018 3.23* 3.90 - 12.70 K/uL Final    RBC 04/14/2018 2.81* 4.00 - 5.40 M/uL Final    Hemoglobin 04/14/2018 7.4* 12.0 - 16.0 g/dL Final    Hematocrit 04/14/2018 22.6* 37.0 - 48.5 % Final    MCV 04/14/2018 80* 82 - 98 fL Final    MCH 04/14/2018 26.3* 27.0 - 31.0 pg Final    MCHC 04/14/2018 32.7  32.0 - 36.0 g/dL Final    RDW 04/14/2018 18.6* 11.5 - 14.5 % Final    Platelets 04/14/2018 80* 150 - 350 K/uL Final    MPV 04/14/2018 10.0  9.2 - 12.9 fL Final    Gran # (ANC) 04/14/2018 2.2  1.8 - 7.7 K/uL Final    Lymph # 04/14/2018 0.5* 1.0 - 4.8 K/uL Final    Mono # 04/14/2018 0.1* 0.3 - 1.0  K/uL Final    Eos # 04/14/2018 0.3  0.0 - 0.5 K/uL Final    Baso # 04/14/2018 0.01  0.00 - 0.20 K/uL Final    Gran% 04/14/2018 69.4  38.0 - 73.0 % Final    Lymph% 04/14/2018 15.2* 18.0 - 48.0 % Final    Mono% 04/14/2018 4.3  4.0 - 15.0 % Final    Eosinophil% 04/14/2018 10.5* 0.0 - 8.0 % Final    Basophil% 04/14/2018 0.3  0.0 - 1.9 % Final    Differential Method 04/14/2018 Automated   Final    Magnesium 04/14/2018 2.5  1.6 - 2.6 mg/dL Final    Phosphorus 04/14/2018 6.1* 2.7 - 4.5 mg/dL Final    Group & Rh 04/14/2018 B POS   Final    Indirect Frederic 04/14/2018 NEG   Final    UNIT NUMBER 04/14/2018 [id number]   Final    PRODUCT CODE 04/14/2018 [id number]   Final    DISPENSE STATUS 04/14/2018 RETURNED   Final    CODING SYSTEM 04/14/2018 [id number]   Final    Unit Blood Type Code 04/14/2018 7300   Final    Unit Blood Type 04/14/2018 B POS   Final    Unit Expiration 04/14/2018 [id number]   Final    Sodium 04/15/2018 133* 136 - 145 mmol/L Final    Potassium 04/15/2018 4.2  3.5 - 5.1 mmol/L Final    Chloride 04/15/2018 99  95 - 110 mmol/L Final    CO2 04/15/2018 24  23 - 29 mmol/L Final    Glucose 04/15/2018 95  70 - 110 mg/dL Final    BUN, Bld 04/15/2018 24* 6 - 20 mg/dL Final    Creatinine 04/15/2018 6.0* 0.5 - 1.4 mg/dL Final    Calcium 04/15/2018 9.3  8.7 - 10.5 mg/dL Final    Anion Gap 04/15/2018 10  8 - 16 mmol/L Final    eGFR if African American 04/15/2018 10* >60 mL/min/1.73 m^2 Final    eGFR if non African American 04/15/2018 9* >60 mL/min/1.73 m^2 Final    Comment: Calculation used to obtain the estimated glomerular filtration  rate (eGFR) is the CKD-EPI equation.       Phosphorus 04/15/2018 5.5* 2.7 - 4.5 mg/dL Final    Magnesium 04/15/2018 2.1  1.6 - 2.6 mg/dL Final    WBC 04/15/2018 3.59* 3.90 - 12.70 K/uL Final    RBC 04/15/2018 2.90* 4.00 - 5.40 M/uL Final    Hemoglobin 04/15/2018 7.7* 12.0 - 16.0 g/dL Final    Hematocrit 04/15/2018 23.2* 37.0 - 48.5 % Final    MCV  04/15/2018 80* 82 - 98 fL Final    MCH 04/15/2018 26.6* 27.0 - 31.0 pg Final    MCHC 04/15/2018 33.2  32.0 - 36.0 g/dL Final    RDW 04/15/2018 18.2* 11.5 - 14.5 % Final    Platelets 04/15/2018 81* 150 - 350 K/uL Final    MPV 04/15/2018 9.4  9.2 - 12.9 fL Final    Gran # (ANC) 04/15/2018 2.5  1.8 - 7.7 K/uL Final    Lymph # 04/15/2018 0.5* 1.0 - 4.8 K/uL Final    Mono # 04/15/2018 0.2* 0.3 - 1.0 K/uL Final    Eos # 04/15/2018 0.4  0.0 - 0.5 K/uL Final    Baso # 04/15/2018 0.01  0.00 - 0.20 K/uL Final    Gran% 04/15/2018 69.7  38.0 - 73.0 % Final    Lymph% 04/15/2018 14.2* 18.0 - 48.0 % Final    Mono% 04/15/2018 5.8  4.0 - 15.0 % Final    Eosinophil% 04/15/2018 9.7* 0.0 - 8.0 % Final    Basophil% 04/15/2018 0.3  0.0 - 1.9 % Final    Differential Method 04/15/2018 Automated   Final    Tacrolimus Lvl 04/15/2018 <1.5* 5.0 - 15.0 ng/mL Final    Comment: Testing performed by Liquid Chromatography-Tandem  Mass Spectrometry (LC-MS/MS).  This test was developed and its performance characteristics  determined by [facility], Department of Pathology  and Laboratory Medicine in a manner consistent with CLIA  requirements. It has not been cleared or approved by the [place]  Food and Drug Administration.  This test is used for clinical   purposes.  It should not be regarded as investigational or for  research.      Sodium 04/16/2018 137  136 - 145 mmol/L Final    Potassium 04/16/2018 4.5  3.5 - 5.1 mmol/L Final    Chloride 04/16/2018 102  95 - 110 mmol/L Final    CO2 04/16/2018 24  23 - 29 mmol/L Final    Glucose 04/16/2018 93  70 - 110 mg/dL Final    BUN, Bld 04/16/2018 47* 6 - 20 mg/dL Final    Creatinine 04/16/2018 8.6* 0.5 - 1.4 mg/dL Final    Calcium 04/16/2018 8.2* 8.7 - 10.5 mg/dL Final    Anion Gap 04/16/2018 11  8 - 16 mmol/L Final    eGFR if  04/16/2018 7* >60 mL/min/1.73 m^2 Final    eGFR if non African American 04/16/2018 6* >60 mL/min/1.73 m^2 Final    Comment:  Calculation used to obtain the estimated glomerular filtration  rate (eGFR) is the CKD-EPI equation.       Phosphorus 04/16/2018 5.9* 2.7 - 4.5 mg/dL Final    Magnesium 04/16/2018 2.3  1.6 - 2.6 mg/dL Final   Admission on 04/14/2018, Discharged on 04/14/2018   Component Date Value Ref Range Status    Albumin, POC 04/14/2018 2.8* 3.3 - 5.5 g/dL Final    Alkaline Phosphatase, POC 04/14/2018 521* 42 - 141 U/L Final    ALT (SGPT), POC 04/14/2018 31  10 - 47 U/L Final    AST (SGOT), POC 04/14/2018 47* 11 - 38 U/L Final    POC BUN 04/14/2018 53* 7 - 22 mg/dL Final    Calcium, POC 04/14/2018 9.6  8.0 - 10.3 mg/dL Final    POC Chloride 04/14/2018 95* 98 - 108 mmol/L Final    POC Creatinine 04/14/2018 10.6* 0.6 - 1.2 mg/dL Final    POC Glucose 04/14/2018 104  73 - 118 mg/dL Final    POC Potassium 04/14/2018 4.2  3.6 - 5.1 mmol/L Final    POC Sodium 04/14/2018 144  128 - 145 mmol/L Final    Bilirubin 04/14/2018 0.5  0.2 - 1.6 mg/dL Final    POC TCO2 04/14/2018 26  18 - 33 mmol/L Final    Protein 04/14/2018 7.7  6.4 - 8.1 g/dL Final    POC B-Type Natriuretic Peptide 04/14/2018 1670* 0.0 - 100.0 pg/mL Final    POC D-DI 04/14/2018 2830* 0.0 - 450.0 ng/mL Final    POC Cardiac Troponin I 04/14/2018 0.02  <0.09 ng/mL Final    Sample 04/14/2018 MICHELL   Final    POC Beta-HCG (Quant) 04/14/2018 <5.0  IU/L Final    Sample 04/14/2018 MICHELL   Final        Imaging Reviewed    Imaging Results          X-Ray Chest 1 View (Final result)  Result time 04/14/18 02:35:41    Final result by Kenny Bond MD (04/14/18 02:35:41)                 Impression:      Diffuse airspace and interstitial opacification suggestive for moderate pulmonary edema.  Atypical pneumonia could have similar appearance.      Electronically signed by: Kenny Bond MD  Date:    04/14/2018  Time:    02:35             Narrative:    EXAMINATION:  XR CHEST 1 VIEW    CLINICAL HISTORY:  shortness of breath;    TECHNIQUE:  Single frontal view of the chest  was performed.    COMPARISON:  02/23/2018.    FINDINGS:  Heart is upper limits of normal but stable in size noting magnification on this single AP view.  There is prominence of central pulmonary vasculature with prominent bilateral interstitial and airspace opacification.  This is slightly more pronounced within the lower lung zones particularly on the right.  Findings are suggestive for moderate pulmonary edema although atypical pneumonia could have similar appearance.  No evidence of pneumothorax or large effusion.  No acute osseous abnormality identified.                                Medications given in ED    Medications   cloNIDine tablet 0.2 mg (0.2 mg Oral Given 4/14/18 0314)   hydrALAZINE tablet 50 mg (50 mg Oral Given 4/14/18 0315)         Note was created using voice recognition software. Note may have occasional typographical errors that may not have been identified and edited despite good bonnie initial review prior to signing.       Clinical Impression:   The primary encounter diagnosis was Symptomatic anemia. Diagnoses of Shortness of breath, ESRD (end stage renal disease), Essential hypertension, and Hypertensive emergency were also pertinent to this visit.    Disposition:   Disposition: Transferred  Condition: Stable  [facility] - accepted by Dr. Rosetta Caba MD  05/20/18 0308

## 2018-04-14 NOTE — ED NOTES
Hold nicadipine drip at this time per md bledsoe verbal order for bp re evaluation after po meds.

## 2018-04-14 NOTE — SUBJECTIVE & OBJECTIVE
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 ()    Moderate protein-calorie malnutrition 2017    MRSA bacteremia 2017    Prophylactic immunotherapy 2014    Renovascular hypertension 10/2/2015    Secondary hyperparathyroidism 2017    Thrombocytopenia 2016       Past Surgical History:   Procedure Laterality Date     SECTION      x 2    KIDNEY TRANSPLANT      LIVER BIOPSY      LIVER TRANSPLANT  1992    TUBAL LIGATION         Review of patient's allergies indicates:   Allergen Reactions    Chloral hydrate      Other reaction(s): Hallucinations  Other reaction(s): Hives    Hydrocodone Other (See Comments)     Mental status changes       Current Facility-Administered Medications on File Prior to Encounter   Medication    [COMPLETED] cloNIDine tablet 0.2 mg    [COMPLETED] hydrALAZINE tablet 50 mg    [DISCONTINUED] niCARdipine 40 mg/200 mL infusion     Current Outpatient Prescriptions on File Prior to Encounter   Medication Sig    aspirin 81 MG Chew Take 1 tablet (81 mg total) by mouth once daily.    cinacalcet (SENSIPAR) 30 MG Tab Take 1 tablet (30 mg total) by mouth daily with breakfast.    cloNIDine 0.2 mg/24 hr td ptwk (CATAPRES) 0.2 mg/24 hr Place 1 patch onto the skin every 7 days. Change every Wednesday    famotidine (PEPCID) 20 MG tablet Take 1 tablet (20 mg total) by mouth 2 (two) times daily.    food supplemt, lactose-reduced (ENSURE ACTIVE HIGH PROTEIN) Liqd Take 236 mLs by mouth 2 (two) times daily.     hydrALAZINE (APRESOLINE) 50 MG tablet Take 1 tablet (50 mg total) by mouth every 8 (eight) hours.    labetalol (NORMODYNE) 200 MG tablet Take 2 tablets (400 mg total) by mouth every 8 (eight) hours.    lacosamide (VIMPAT) 50 mg Tab Take 1 tablet (50 mg total) by mouth every 12 (twelve) hours. (Patient taking differently: Take 50 mg by mouth every 12 (twelve) hours. )    levETIRAcetam (KEPPRA) 500 MG Tab Take 1 tablet (500 mg total) by mouth 2 (two) times daily.    NIFEdipine (PROCARDIA-XL) 60 MG (OSM) 24 hr tablet Take 1 tablet (60 mg total) by mouth once daily. (Patient taking differently: Take 60 mg by mouth once daily. )    ondansetron (ZOFRAN) 4 MG tablet Take 1 tablet (4 mg total) by mouth every 6 (six) hours.    oxyCODONE (ROXICODONE) 5 MG immediate release tablet Take 1 tablet (5 mg total) by mouth every 4 (four) hours as needed for Pain.    predniSONE (DELTASONE) 1 MG tablet Take 1 mg by mouth every other day.    tacrolimus (PROGRAF) 1 MG Cap Take 7 capsules (7 mg total) by mouth every 12 (twelve) hours.    triamcinolone acetonide 0.1% (KENALOG) 0.1 % ointment AAA on arms, legs, and neck bid x 1-2 wks then prn flares only (Patient taking differently: Apply to affected area(s) on arms, legs, and neck twice daily x 1-2 wks then as needed for flares only)     Family History     Problem Relation (Age of Onset)    Hypertension Mother, Father        Social History Main Topics    Smoking status: Never Smoker    Smokeless tobacco: Never Used    Alcohol use No    Drug use: No    Sexual activity: No     Review of Systems   Constitutional: Negative.    HENT: Negative.    Eyes: Negative.    Respiratory: Positive for shortness of breath.    Cardiovascular: Negative.    Gastrointestinal: Negative.    Endocrine: Negative.    Genitourinary: Positive for decreased urine volume.   Musculoskeletal: Negative.    Neurological: Negative.    Psychiatric/Behavioral: Negative.      Objective:     Vital Signs (Most  Recent):  Temp: 98.4 °F (36.9 °C) (04/14/18 0715)  Pulse: 104 (04/14/18 0945)  Resp: 12 (04/14/18 0945)  BP: (!) 148/92 (04/14/18 0945)  SpO2: 96 % (04/14/18 0945) Vital Signs (24h Range):  Temp:  [98 °F (36.7 °C)-98.4 °F (36.9 °C)] 98.4 °F (36.9 °C)  Pulse:  [] 104  Resp:  [12-33] 12  SpO2:  [92 %-100 %] 96 %  BP: (137-228)/() 148/92        There is no height or weight on file to calculate BMI.    Physical Exam   Constitutional: She is oriented to person, place, and time. She appears well-developed and well-nourished. No distress.   HENT:   Head: Normocephalic and atraumatic.   Mouth/Throat: Oropharynx is clear and moist.   Eyes: EOM are normal. Pupils are equal, round, and reactive to light.   Neck: Normal range of motion. Neck supple.   Cardiovascular: Regular rhythm and intact distal pulses.    Murmur heard.  Tachycardia S1 S2   Pulmonary/Chest: Effort normal. No respiratory distress.   Abdominal: Soft. Bowel sounds are normal. She exhibits distension. There is no tenderness.   Musculoskeletal: Normal range of motion. She exhibits no edema.   Neurological: She is alert and oriented to person, place, and time.   Skin: Skin is warm and dry. Capillary refill takes less than 2 seconds.   Psychiatric: She has a normal mood and affect. Her behavior is normal.         CRANIAL NERVES     CN III, IV, VI   Pupils are equal, round, and reactive to light.  Extraocular motions are normal.        Significant Labs:   CBC:   Recent Labs  Lab 04/12/18 1830 04/14/18  0707   WBC 2.44* 3.23*   HGB 7.6* 7.4*   HCT 24.1* 22.6*   PLT 75* 80*     CMP:   Recent Labs  Lab 04/12/18 1830 04/14/18  0707    140   K 4.0 4.9   CL 97 99   CO2 30* 27   * 98   BUN 37* 57*   CREATININE 8.8* 11.0*   CALCIUM 9.3 9.4   PROT 8.3 7.9   ALBUMIN 2.9* 2.8*   BILITOT 0.5 0.6   ALKPHOS 606* 618*   AST 40 35   ALT 28 28   ANIONGAP 14 14   EGFRNONAA 5.6* 4*     Cardiac Markers: No results for input(s): CKMB, MYOGLOBIN, BNP,  TROPISTAT in the last 48 hours.  Magnesium:   Recent Labs  Lab 04/14/18  0707   MG 2.5     Troponin: No results for input(s): TROPONINI in the last 48 hours.    Significant Imaging: I have reviewed all pertinent imaging results/findings within the past 24 hours.

## 2018-04-14 NOTE — ASSESSMENT & PLAN NOTE
Chronically low, assume epogen given with HD  Type and screen one unit and transfuse one unit today

## 2018-04-14 NOTE — H&P
[facility] Medicine  History & Physical    Patient Name: Holly Patel  MRN: [id number]  Admission Date: 2018  Attending Physician: Michelle Velazquez MD   Primary Care Provider: Galindo Amor MD         Patient information was obtained from patient.     Subjective:     Principal Problem:Hypertensive emergency    Chief Complaint: No chief complaint on file.       HPI: 26 yo female with liver transplant (), renovascular HTN, anemia of chronic disease, secondary hyperparathyroidism, seizure disorder, and severe protein malnutrition presented from [facility] ED with report of SOB. There is no ED documentation to review from [facility]. Apparently blood pressure uncontrolled and requiring cardene infusion. Pt last HD was on 4/10. She went on  to her scheduled HD session but nurse did not feel comfortable using access with pus coming from site and sent her to ED. She saw vascular surgery [place] [facility] [place] and was discharged home and ok to use HD access. No pus present today but missed HD and symptomatic. Pt admitted to ICU for emergent HD and HTN control.     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 ()    Moderate protein-calorie malnutrition 2017    MRSA bacteremia 2017    Prophylactic immunotherapy 2014    Renovascular hypertension 10/2/2015    Secondary hyperparathyroidism 2017    Thrombocytopenia 2016       Past Surgical History:   Procedure Laterality Date     SECTION      x 2     KIDNEY TRANSPLANT      LIVER BIOPSY      LIVER TRANSPLANT  09/1992    TUBAL LIGATION  2010       Review of patient's allergies indicates:   Allergen Reactions    Chloral hydrate      Other reaction(s): Hallucinations  Other reaction(s): Hives    Hydrocodone Other (See Comments)     Mental status changes       Current Facility-Administered Medications on File Prior to Encounter   Medication    [COMPLETED] cloNIDine tablet 0.2 mg    [COMPLETED] hydrALAZINE tablet 50 mg    [DISCONTINUED] niCARdipine 40 mg/200 mL infusion     Current Outpatient Prescriptions on File Prior to Encounter   Medication Sig    aspirin 81 MG Chew Take 1 tablet (81 mg total) by mouth once daily.    cinacalcet (SENSIPAR) 30 MG Tab Take 1 tablet (30 mg total) by mouth daily with breakfast.    cloNIDine 0.2 mg/24 hr td ptwk (CATAPRES) 0.2 mg/24 hr Place 1 patch onto the skin every 7 days. Change every Wednesday    famotidine (PEPCID) 20 MG tablet Take 1 tablet (20 mg total) by mouth 2 (two) times daily.    food supplemt, lactose-reduced (ENSURE ACTIVE HIGH PROTEIN) Liqd Take 236 mLs by mouth 2 (two) times daily.    hydrALAZINE (APRESOLINE) 50 MG tablet Take 1 tablet (50 mg total) by mouth every 8 (eight) hours.    labetalol (NORMODYNE) 200 MG tablet Take 2 tablets (400 mg total) by mouth every 8 (eight) hours.    lacosamide (VIMPAT) 50 mg Tab Take 1 tablet (50 mg total) by mouth every 12 (twelve) hours. (Patient taking differently: Take 50 mg by mouth every 12 (twelve) hours. )    levETIRAcetam (KEPPRA) 500 MG Tab Take 1 tablet (500 mg total) by mouth 2 (two) times daily.    NIFEdipine (PROCARDIA-XL) 60 MG (OSM) 24 hr tablet Take 1 tablet (60 mg total) by mouth once daily. (Patient taking differently: Take 60 mg by mouth once daily. )    ondansetron (ZOFRAN) 4 MG tablet Take 1 tablet (4 mg total) by mouth every 6 (six) hours.    oxyCODONE (ROXICODONE) 5 MG immediate release tablet Take 1 tablet (5 mg total) by mouth every 4  (four) hours as needed for Pain.    predniSONE (DELTASONE) 1 MG tablet Take 1 mg by mouth every other day.    tacrolimus (PROGRAF) 1 MG Cap Take 7 capsules (7 mg total) by mouth every 12 (twelve) hours.    triamcinolone acetonide 0.1% (KENALOG) 0.1 % ointment AAA on arms, legs, and neck bid x 1-2 wks then prn flares only (Patient taking differently: Apply to affected area(s) on arms, legs, and neck twice daily x 1-2 wks then as needed for flares only)     Family History     Problem Relation (Age of Onset)    Hypertension Mother, Father        Social History Main Topics    Smoking status: Never Smoker    Smokeless tobacco: Never Used    Alcohol use No    Drug use: No    Sexual activity: No     Review of Systems   Constitutional: Negative.    HENT: Negative.    Eyes: Negative.    Respiratory: Positive for shortness of breath.    Cardiovascular: Negative.    Gastrointestinal: Negative.    Endocrine: Negative.    Genitourinary: Positive for decreased urine volume.   Musculoskeletal: Negative.    Neurological: Negative.    Psychiatric/Behavioral: Negative.      Objective:     Vital Signs (Most Recent):  Temp: 98.4 °F (36.9 °C) (04/14/18 0715)  Pulse: 104 (04/14/18 0945)  Resp: 12 (04/14/18 0945)  BP: (!) 148/92 (04/14/18 0945)  SpO2: 96 % (04/14/18 0945) Vital Signs (24h Range):  Temp:  [98 °F (36.7 °C)-98.4 °F (36.9 °C)] 98.4 °F (36.9 °C)  Pulse:  [] 104  Resp:  [12-33] 12  SpO2:  [92 %-100 %] 96 %  BP: (137-228)/() 148/92        There is no height or weight on file to calculate BMI.    Physical Exam   Constitutional: She is oriented to person, place, and time. She appears well-developed and well-nourished. No distress.   HENT:   Head: Normocephalic and atraumatic.   Mouth/Throat: Oropharynx is clear and moist.   Eyes: EOM are normal. Pupils are equal, round, and reactive to light.   Neck: Normal range of motion. Neck supple.   Cardiovascular: Regular rhythm and intact distal pulses.    Murmur  heard.  Tachycardia S1 S2   Pulmonary/Chest: Effort normal. No respiratory distress.   Abdominal: Soft. Bowel sounds are normal. She exhibits distension. There is no tenderness.   Musculoskeletal: Normal range of motion. She exhibits no edema.   Neurological: She is alert and oriented to person, place, and time.   Skin: Skin is warm and dry. Capillary refill takes less than 2 seconds.   Psychiatric: She has a normal mood and affect. Her behavior is normal.         CRANIAL NERVES     CN III, IV, VI   Pupils are equal, round, and reactive to light.  Extraocular motions are normal.        Significant Labs:   CBC:   Recent Labs  Lab 04/12/18 1830 04/14/18  0707   WBC 2.44* 3.23*   HGB 7.6* 7.4*   HCT 24.1* 22.6*   PLT 75* 80*     CMP:   Recent Labs  Lab 04/12/18 1830 04/14/18  0707    140   K 4.0 4.9   CL 97 99   CO2 30* 27   * 98   BUN 37* 57*   CREATININE 8.8* 11.0*   CALCIUM 9.3 9.4   PROT 8.3 7.9   ALBUMIN 2.9* 2.8*   BILITOT 0.5 0.6   ALKPHOS 606* 618*   AST 40 35   ALT 28 28   ANIONGAP 14 14   EGFRNONAA 5.6* 4*     Cardiac Markers: No results for input(s): CKMB, MYOGLOBIN, BNP, TROPISTAT in the last 48 hours.  Magnesium:   Recent Labs  Lab 04/14/18  0707   MG 2.5     Troponin: No results for input(s): TROPONINI in the last 48 hours.    Significant Imaging: I have reviewed all pertinent imaging results/findings within the past 24 hours.    Assessment/Plan:     * Hypertensive emergency    Wean cardene infusion  Resume home meds          Pancytopenia    Secondary to immunosuppressants  Monitor outpatient with transplant   Hold heparin           Seizures    resume vimpat and keppra  No acute issues            Elevated troponin              Moderate protein-calorie malnutrition    Renal dietary modifications with supplementation           Secondary hyperparathyroidism    Resume home meds, HD per nephrology           Anemia in ESRD (end-stage renal disease)    Chronically low, assume epogen given with  HD  Type and screen one unit and transfuse one unit today          ESRD on hemodialysis    Admitted to ICU for emergent HD  Nephrology consulted  Renal diet  VS consult for access evaluation           Liver replaced by transplant    On immunosuppressants   Check prograf level  Resume prednisone  Follow up [facility]             VTE Risk Mitigation         Ordered     Place sequential compression device  Until discontinued      04/14/18 1104     Place LINDA hose  Until discontinued      04/14/18 1104     IP VTE LOW RISK PATIENT  Once      04/14/18 1104        Critical care time spent on the evaluation and treatment of severe organ dysfunction, review of pertinent labs and imaging studies, discussions with consulting providers and discussions with patient/family: 45 minutes.     Michelle Velazquez MD  Department of Hospital Medicine   [facility]

## 2018-04-14 NOTE — ASSESSMENT & PLAN NOTE
Admitted to ICU for emergent HD  Nephrology consulted  Renal diet  VS consult for access evaluation

## 2018-04-14 NOTE — PLAN OF CARE
Problem: Patient Care Overview  Goal: Plan of Care Review  Outcome: Ongoing (interventions implemented as appropriate)  Pt remains ICU, AAO X4, SBP maintained <180 per niCaripine gtt, sat > 95% on 2L O2, renal diet but very poor appetite, no nausea/vomit noted, no BM, c/o headache X1 Tylenol Po given, blood transfusion held per  order, HD in progress at bedside, plan of care reviewed with pt and mother, verbalized understanding, no acute distress noted at present time, will continue to monitor.

## 2018-04-14 NOTE — PLAN OF CARE
Problem: Hemodialysis (Adult)  Goal: Signs and Symptoms of Listed Potential Problems Will be Absent, Minimized or Managed (Hemodialysis)  Signs and symptoms of listed potential problems will be absent, minimized or managed by discharge/transition of care (reference Hemodialysis (Adult) CPG).   Outcome: Ongoing (interventions implemented as appropriate)   04/14/18 1814   Hemodialysis   Problems Assessed (Hemodialysis) all   Problems Present (Hemodialysis) cardiovascular complications;electrolyte imbalance;fluid imbalance;hematologic complications;process complications;situational response;vascular access complications       Comments: 4 hour hd tx in progress as ordered.

## 2018-04-14 NOTE — CONSULTS
REQUESTING CONSULT:  Michelle Velazquez M.D.    REASON FOR CONSULTATION:  Hemodialysis, was not dialyzed on Thursday due to   reported purulent drainage from the graft, now sign of infection, on [facility].    HISTORY OF PRESENT ILLNESS:  This is a 27-year-old female with history of   end-stage renal disease.  She dialyzes at [facility] on Tuesday,   Thursday, and Saturday.  She noted that she last dialyzed on Tuesday.    Subsequently, when she went to dialysis on Thursday, she had purulent drainage,   went to [facility], was seen by Vascular Surgery, ruled that was okay   and subsequently presented here with shortness of breath for her unit, so her   last treatment was on Tuesday.  She came in hypertensive requiring Cardene and   short of breath.  She also has a history of anemia, recent transfusion.    Subsequently, we were asked to provide assistance of her dialysis needs since   her nephrologist does not come here.    PAST MEDICAL HISTORY:  Significant for end-stage renal disease, history of liver   transplant, history of blood transfusion, history of splenomegaly,   immunosuppressants, history of hemangioendothelioma, history of MRSA,   hypertension, secondary hyperparathyroidism, thrombocytopenia.    PAST SURGICAL HISTORY:  Significant for , liver transplant, liver   biopsy, tubal ligation.    SOCIAL HISTORY:  She does not smoke.    FAMILY HISTORY:  Mother with hypertension.    CURRENT MEDICATIONS:  Sensipar, clonidine, Pepcid, Lacosamide, Keppra and   prednisone.    REVIEW OF SYSTEMS:  On a 10-point review of systems, pertinent for the shortness   of breath, purulent drainage from her AV fistula.    PHYSICAL EXAMINATION:  VITAL SIGNS:  92, heart rate 101, respirations 38, blood pressure 169/107.  GENERAL:  Well-developed female, in no acute distress.  HEENT:  Normocephalic.  Eyes open.  Dry mucous membrane.  CARDIOVASCULAR SYSTEM:  S1, S2, slightly tachycardic.  PULMONARY:  Clear  to auscultation.  ABDOMEN:  Positive bowel sounds, soft, nondistended.  EXTREMITIES:  Shows no edema.  AV graft at the upper extremities shows no   drainage, not warm, no fluctuance noted, buttonhole noted.    LABORATORY DATA:  White count 3.2, H and H of 7.4 and 22.6, platelet count 80.    Sodium 140, potassium 4.9, BUN and creatinine of 57 and 11, calcium is 9.4, phos   is 6.1, albumin 2.8.  Pregnancy test negative.      ASSESSMENT AND PLAN:  1.  End-stage renal disease, plan for dialysis today.  2.  Hypertension, currently on Cardene.  We will wean blood pressure as we will   try to ultrafiltration for today.  3.  Anemia of end-stage renal disease.  Would not transfuse, not significantly   depressed hemoglobin, suspect this is hemodilutional, can resume Epogen in   her unit.  4.  Malfunction of her arteriovenous fistula.  No purulent material now.  No   fever noted.  Would not do buttonhole anymore.  5.  Secondary hyperparathyroidism.  Continue Sensipar for now.  Calcium noted,   not sure she has taken it.  6.  Hyperphosphatemia.  Continue binders.  7.  Liver transplant.  Would resume her Prograf.              /ls [id number] blank(s)        JAYE/IN  dd: 04/14/2018 14:57:37 (CDT)  td: 04/14/2018 17:41:53 (CDT)  Doc ID   #[id number]  Job ID #[id number]    CC:     Esrd, anemia , malfxn avf, 2nd hyperpar, hyperphos, liver tx  Cont hd tts

## 2018-04-14 NOTE — HPI
26 yo female with liver transplant (1992), renovascular HTN, anemia of chronic disease, secondary hyperparathyroidism, seizure disorder, and severe protein malnutrition presented from [facility] ED with report of SOB. There is no ED documentation to review from [facility]. Apparently blood pressure uncontrolled and requiring cardene infusion. Pt last HD was on 4/10. She went on 4/12 to her scheduled HD session but nurse did not feel comfortable using access with pus coming from site and sent her to ED. She saw vascular surgery at [facility] ED and was discharged home and ok to use HD access. No pus present today but missed HD and symptomatic. Pt admitted to ICU for emergent HD and HTN control. The patient clinically improved and was sent to the floor on 4/15.

## 2018-04-15 ENCOUNTER — NURSE TRIAGE (OUTPATIENT)
Dept: ADMINISTRATIVE | Facility: CLINIC | Age: 28
End: 2018-04-15

## 2018-04-15 LAB
ANION GAP SERPL CALC-SCNC: 10 MMOL/L
BASOPHILS # BLD AUTO: 0.01 K/UL
BASOPHILS NFR BLD: 0.3 %
BUN SERPL-MCNC: 24 MG/DL
CALCIUM SERPL-MCNC: 9.3 MG/DL
CHLORIDE SERPL-SCNC: 99 MMOL/L
CO2 SERPL-SCNC: 24 MMOL/L
CREAT SERPL-MCNC: 6 MG/DL
DIFFERENTIAL METHOD: ABNORMAL
EOSINOPHIL # BLD AUTO: 0.4 K/UL
EOSINOPHIL NFR BLD: 9.7 %
ERYTHROCYTE [DISTWIDTH] IN BLOOD BY AUTOMATED COUNT: 18.2 %
EST. GFR  (AFRICAN AMERICAN): 10 ML/MIN/1.73 M^2
EST. GFR  (NON AFRICAN AMERICAN): 9 ML/MIN/1.73 M^2
GLUCOSE SERPL-MCNC: 95 MG/DL
HCT VFR BLD AUTO: 23.2 %
HGB BLD-MCNC: 7.7 G/DL
LYMPHOCYTES # BLD AUTO: 0.5 K/UL
LYMPHOCYTES NFR BLD: 14.2 %
MAGNESIUM SERPL-MCNC: 2.1 MG/DL
MCH RBC QN AUTO: 26.6 PG
MCHC RBC AUTO-ENTMCNC: 33.2 G/DL
MCV RBC AUTO: 80 FL
MONOCYTES # BLD AUTO: 0.2 K/UL
MONOCYTES NFR BLD: 5.8 %
NEUTROPHILS # BLD AUTO: 2.5 K/UL
NEUTROPHILS NFR BLD: 69.7 %
PHOSPHATE SERPL-MCNC: 5.5 MG/DL
PLATELET # BLD AUTO: 81 K/UL
PMV BLD AUTO: 9.4 FL
POTASSIUM SERPL-SCNC: 4.2 MMOL/L
RBC # BLD AUTO: 2.9 M/UL
SODIUM SERPL-SCNC: 133 MMOL/L
WBC # BLD AUTO: 3.59 K/UL

## 2018-04-15 PROCEDURE — 80197 ASSAY OF TACROLIMUS: CPT

## 2018-04-15 PROCEDURE — 25000003 PHARM REV CODE 250: Performed by: HOSPITALIST

## 2018-04-15 PROCEDURE — 36415 COLL VENOUS BLD VENIPUNCTURE: CPT

## 2018-04-15 PROCEDURE — 25000003 PHARM REV CODE 250: Performed by: INTERNAL MEDICINE

## 2018-04-15 PROCEDURE — 84100 ASSAY OF PHOSPHORUS: CPT

## 2018-04-15 PROCEDURE — 94761 N-INVAS EAR/PLS OXIMETRY MLT: CPT

## 2018-04-15 PROCEDURE — 80048 BASIC METABOLIC PNL TOTAL CA: CPT

## 2018-04-15 PROCEDURE — 63600175 PHARM REV CODE 636 W HCPCS: Performed by: HOSPITALIST

## 2018-04-15 PROCEDURE — 85025 COMPLETE CBC W/AUTO DIFF WBC: CPT

## 2018-04-15 PROCEDURE — 83735 ASSAY OF MAGNESIUM: CPT

## 2018-04-15 PROCEDURE — 63600175 PHARM REV CODE 636 W HCPCS: Performed by: INTERNAL MEDICINE

## 2018-04-15 PROCEDURE — 21400001 HC TELEMETRY ROOM

## 2018-04-15 RX ORDER — TACROLIMUS 1 MG/1
7 CAPSULE ORAL 2 TIMES DAILY
Status: DISCONTINUED | OUTPATIENT
Start: 2018-04-15 | End: 2018-04-15

## 2018-04-15 RX ORDER — LABETALOL 100 MG/1
400 TABLET, FILM COATED ORAL EVERY 12 HOURS
Status: DISCONTINUED | OUTPATIENT
Start: 2018-04-15 | End: 2018-04-16 | Stop reason: HOSPADM

## 2018-04-15 RX ORDER — NIFEDIPINE 30 MG/1
60 TABLET, EXTENDED RELEASE ORAL DAILY
Status: DISCONTINUED | OUTPATIENT
Start: 2018-04-15 | End: 2018-04-16 | Stop reason: HOSPADM

## 2018-04-15 RX ORDER — HYDRALAZINE HYDROCHLORIDE 25 MG/1
50 TABLET, FILM COATED ORAL EVERY 8 HOURS
Status: DISCONTINUED | OUTPATIENT
Start: 2018-04-15 | End: 2018-04-16 | Stop reason: HOSPADM

## 2018-04-15 RX ORDER — TACROLIMUS 1 MG/1
7 CAPSULE ORAL 2 TIMES DAILY
Status: DISCONTINUED | OUTPATIENT
Start: 2018-04-15 | End: 2018-04-16 | Stop reason: HOSPADM

## 2018-04-15 RX ORDER — HYDRALAZINE HYDROCHLORIDE 20 MG/ML
10 INJECTION INTRAMUSCULAR; INTRAVENOUS ONCE
Status: COMPLETED | OUTPATIENT
Start: 2018-04-15 | End: 2018-04-15

## 2018-04-15 RX ADMIN — FAMOTIDINE 20 MG: 20 TABLET, FILM COATED ORAL at 08:04

## 2018-04-15 RX ADMIN — NICARDIPINE HYDROCHLORIDE 5 MG/HR: 0.2 INJECTION, SOLUTION INTRAVENOUS at 05:04

## 2018-04-15 RX ADMIN — LEVETIRACETAM 500 MG: 500 TABLET, FILM COATED ORAL at 09:04

## 2018-04-15 RX ADMIN — HYDRALAZINE HYDROCHLORIDE 50 MG: 25 TABLET ORAL at 01:04

## 2018-04-15 RX ADMIN — LABETALOL HYDROCHLORIDE 400 MG: 100 TABLET, FILM COATED ORAL at 10:04

## 2018-04-15 RX ADMIN — TACROLIMUS 7 MG: 1 CAPSULE ORAL at 09:04

## 2018-04-15 RX ADMIN — HYDRALAZINE HYDROCHLORIDE 10 MG: 20 INJECTION INTRAMUSCULAR; INTRAVENOUS at 05:04

## 2018-04-15 RX ADMIN — LABETALOL HYDROCHLORIDE 400 MG: 100 TABLET, FILM COATED ORAL at 09:04

## 2018-04-15 RX ADMIN — LACOSAMIDE 50 MG: 50 TABLET, FILM COATED ORAL at 09:04

## 2018-04-15 RX ADMIN — PREDNISONE 1 MG: 1 TABLET ORAL at 08:04

## 2018-04-15 RX ADMIN — HYDRALAZINE HYDROCHLORIDE 50 MG: 25 TABLET ORAL at 09:04

## 2018-04-15 RX ADMIN — LACOSAMIDE 50 MG: 50 TABLET, FILM COATED ORAL at 08:04

## 2018-04-15 RX ADMIN — LEVETIRACETAM 500 MG: 500 TABLET, FILM COATED ORAL at 08:04

## 2018-04-15 RX ADMIN — NIFEDIPINE 60 MG: 30 TABLET, FILM COATED, EXTENDED RELEASE ORAL at 10:04

## 2018-04-15 RX ADMIN — TACROLIMUS 7 MG: 1 CAPSULE ORAL at 01:04

## 2018-04-15 RX ADMIN — Medication 1 CAPSULE: at 08:04

## 2018-04-15 RX ADMIN — CINACALCET HYDROCHLORIDE 30 MG: 30 TABLET, COATED ORAL at 08:04

## 2018-04-15 NOTE — PROGRESS NOTES
[facility] Medicine  Progress Note    Patient Name: Holly Patel  MRN: [id number]  Patient Class: IP- Inpatient   Admission Date: 4/14/2018  Length of Stay: 1 days  Attending Physician: Michelle Velazquez MD  Primary Care Provider: Galindo Amor MD        Subjective:     Principal Problem:Hypertensive emergency    HPI:  26 yo female with liver transplant (1992), renovascular HTN, anemia of chronic disease, secondary hyperparathyroidism, seizure disorder, and severe protein malnutrition presented from [facility] ED with report of SOB. There is no ED documentation to review from [facility]. Apparently blood pressure uncontrolled and requiring cardene infusion. Pt last HD was on 4/10. She went on 4/12 to her scheduled HD session but nurse did not feel comfortable using access with pus coming from site and sent her to ED. She saw vascular surgery at [facility] main ED and was discharged home and ok to use HD access. No pus present today but missed HD and symptomatic. Pt admitted to ICU for emergent HD and HTN control.     Hospital Course:  Pt was admitted from [facility] ED with HTN emergency and SOB. She received HD 4/14. She was placed on cardene infusion and resumed on all home meds. Cardene infusion has been discontinued. She is stable for transfer to telemetry floor. She needs further titration and tighter control of BP. Ok for transfer to tele floor. Nephrology following.     Of note, called after hours Organ transplant service. Dr. Hanson confirmed Prednisone 1mg every other day and Prograf 7mg BID. Prograf level pending.     Interval History: SOB improved     Review of Systems   Constitutional: Negative.    HENT: Negative.    Eyes: Negative.    Respiratory: Positive for shortness of breath.    Cardiovascular: Negative.    Gastrointestinal: Negative.    Endocrine: Negative.    Genitourinary: Positive for decreased urine volume.   Musculoskeletal: Negative.    Neurological: Negative.     Psychiatric/Behavioral: Negative.      Objective:     Vital Signs (Most Recent):  Temp: 98.4 °F (36.9 °C) (04/15/18 1115)  Pulse: 94 (04/15/18 1100)  Resp: (!) 29 (04/15/18 1100)  BP: (!) 157/108 (04/15/18 1100)  SpO2: 100 % (04/15/18 1100) Vital Signs (24h Range):  Temp:  [97.8 °F (36.6 °C)-98.7 °F (37.1 °C)] 98.4 °F (36.9 °C)  Pulse:  [] 94  Resp:  [3-41] 29  SpO2:  [90 %-100 %] 100 %  BP: (125-184)/() 157/108     Weight: 53.4 kg (117 lb 11.6 oz)  Body mass index is 20.21 kg/m².    Intake/Output Summary (Last 24 hours) at 04/15/18 1224  Last data filed at 04/15/18 1100   Gross per 24 hour   Intake          1240.83 ml   Output             4157 ml   Net         -2916.17 ml      Physical Exam   Constitutional: She is oriented to person, place, and time. She appears well-developed and well-nourished. No distress.   HENT:   Head: Normocephalic and atraumatic.   Mouth/Throat: Oropharynx is clear and moist.   Eyes: EOM are normal. Pupils are equal, round, and reactive to light.   Neck: Normal range of motion. Neck supple.   Cardiovascular: Regular rhythm and intact distal pulses.    Murmur heard.  Tachycardia S1 S2   Pulmonary/Chest: Effort normal.   Abdominal: Soft. Bowel sounds are normal. She exhibits distension. There is no tenderness.   Musculoskeletal: Normal range of motion. She exhibits no edema.   Neurological: She is alert and oriented to person, place, and time.   Skin: Skin is warm and dry. Capillary refill takes less than 2 seconds.   Psychiatric: She has a normal mood and affect. Her behavior is normal.       Significant Labs:   BMP:   Recent Labs  Lab 04/15/18  0353   GLU 95   *   K 4.2   CL 99   CO2 24   BUN 24*   CREATININE 6.0*   CALCIUM 9.3   MG 2.1     CBC:   Recent Labs  Lab 04/14/18  0707 04/15/18  0353   WBC 3.23* 3.59*   HGB 7.4* 7.7*   HCT 22.6* 23.2*   PLT 80* 81*       Significant Imaging: I have reviewed all pertinent imaging results/findings within the past 24  hours.    Assessment/Plan:      * Hypertensive emergency    Cardene off  Resume home meds and titrate for tighter BP control  Pt counseled on medication compliance           Pancytopenia    Secondary to immunosuppressants  Monitor outpatient with transplant   Hold heparin           Seizures    resume vimpat and keppra  No acute issues            Elevated troponin              Moderate protein-calorie malnutrition    Renal dietary modifications with supplementation           Secondary hyperparathyroidism    Resume home meds, HD per nephrology           Anemia in ESRD (end-stage renal disease)    Chronically low, assume epogen given with HD  Blood transfusion cancelled   Hemoglobin up 7.7           Renovascular hypertension              ESRD on hemodialysis    Admitted to ICU for emergent HD  Nephrology consulted  Renal diet  VS consult for access evaluation           Liver replaced by transplant    On immunosuppressants   Check prograf level  Resume prednisone, 1 mg every other day  Prograf 7mg BID  Follow up [facility]             VTE Risk Mitigation         Ordered     Place sequential compression device  Until discontinued      04/14/18 1104     Place LINDA hose  Until discontinued      04/14/18 1104     IP VTE LOW RISK PATIENT  Once      04/14/18 1104          Critical care time spent on the evaluation and treatment of severe organ dysfunction, review of pertinent labs and imaging studies, discussions with consulting providers and discussions with patient/family: 35 minutes.    Michelle Velazquez MD  Department of Hospital Medicine   [facility]

## 2018-04-15 NOTE — ASSESSMENT & PLAN NOTE
On immunosuppressants   Check prograf level  Resume prednisone, 1 mg every other day  Prograf 7mg BID  Follow up [facility]

## 2018-04-15 NOTE — SUBJECTIVE & OBJECTIVE
Interval History: SOB improved     Review of Systems   Constitutional: Negative.    HENT: Negative.    Eyes: Negative.    Respiratory: Positive for shortness of breath.    Cardiovascular: Negative.    Gastrointestinal: Negative.    Endocrine: Negative.    Genitourinary: Positive for decreased urine volume.   Musculoskeletal: Negative.    Neurological: Negative.    Psychiatric/Behavioral: Negative.      Objective:     Vital Signs (Most Recent):  Temp: 98.4 °F (36.9 °C) (04/15/18 1115)  Pulse: 94 (04/15/18 1100)  Resp: (!) 29 (04/15/18 1100)  BP: (!) 157/108 (04/15/18 1100)  SpO2: 100 % (04/15/18 1100) Vital Signs (24h Range):  Temp:  [97.8 °F (36.6 °C)-98.7 °F (37.1 °C)] 98.4 °F (36.9 °C)  Pulse:  [] 94  Resp:  [3-41] 29  SpO2:  [90 %-100 %] 100 %  BP: (125-184)/() 157/108     Weight: 53.4 kg (117 lb 11.6 oz)  Body mass index is 20.21 kg/m².    Intake/Output Summary (Last 24 hours) at 04/15/18 1224  Last data filed at 04/15/18 1100   Gross per 24 hour   Intake          1240.83 ml   Output             4157 ml   Net         -2916.17 ml      Physical Exam   Constitutional: She is oriented to person, place, and time. She appears well-developed and well-nourished. No distress.   HENT:   Head: Normocephalic and atraumatic.   Mouth/Throat: Oropharynx is clear and moist.   Eyes: EOM are normal. Pupils are equal, round, and reactive to light.   Neck: Normal range of motion. Neck supple.   Cardiovascular: Regular rhythm and intact distal pulses.    Murmur heard.  Tachycardia S1 S2   Pulmonary/Chest: Effort normal.   Abdominal: Soft. Bowel sounds are normal. She exhibits distension. There is no tenderness.   Musculoskeletal: Normal range of motion. She exhibits no edema.   Neurological: She is alert and oriented to person, place, and time.   Skin: Skin is warm and dry. Capillary refill takes less than 2 seconds.   Psychiatric: She has a normal mood and affect. Her behavior is normal.       Significant Labs:   BMP:    Recent Labs  Lab 04/15/18  0353   GLU 95   *   K 4.2   CL 99   CO2 24   BUN 24*   CREATININE 6.0*   CALCIUM 9.3   MG 2.1     CBC:   Recent Labs  Lab 04/14/18  0707 04/15/18  0353   WBC 3.23* 3.59*   HGB 7.4* 7.7*   HCT 22.6* 23.2*   PLT 80* 81*       Significant Imaging: I have reviewed all pertinent imaging results/findings within the past 24 hours.

## 2018-04-15 NOTE — TELEPHONE ENCOUNTER
Post liver 11/8/92. Dr. Velazquez reported that the patient is in [facility] ICU for dialysis and uncontrolled hypertension. Inquired about correct orders for Prograf and Prednisone. Notified Dr. Hanson who reported Prograf 7mg oral BID and Prednisone 1mg oral every other day. Updated Dr. Velazquez and she verbalized understanding.    Reason for Disposition   Physician call to PCP    Protocols used: ST PCP CALL - NO TRIAGE-A-AH

## 2018-04-15 NOTE — NURSING
Pt's 's-170's notified MD Karon verbalized acknowledgment and ordered Hydralazine 10mg IV once, will continue to monitor.

## 2018-04-15 NOTE — PLAN OF CARE
04/15/18 1804   Discharge Assessment   Do you have any financial concerns preventing you from receiving the healthcare you need? Other (comments)  (TN/SW attempted d/c needs assessment)

## 2018-04-15 NOTE — ASSESSMENT & PLAN NOTE
Cardene off  Resume home meds and titrate for tighter BP control  Pt counseled on medication compliance

## 2018-04-15 NOTE — PLAN OF CARE
Problem: Patient Care Overview  Goal: Plan of Care Review  Outcome: Ongoing (interventions implemented as appropriate)  Patient AAOx4. No acute distress. Afebrile. Sinus rhythm on cardiac monitor. Hypertensive. Patient remains on IV Cardene drip. Room air. Patient able to ambulate without assist. Cardiac diet ordered. Patient refuses to wear SCD's. Dialysis treatment completed and 4L of fluid removed. Plan of care is to resume home blood pressure meds and wean off Cardene drip. Patient aware of plan.

## 2018-04-15 NOTE — NURSING TRANSFER
Nursing Transfer Note      4/15/2018     Transfer Rm [id number]  Transfer via wheelchair   Transfer with pt's belonging   Transported by nurse and transport personal   Medicines sent: Tacrolimus   Chart send with patient: yes   Notified: pt's family at bedside     Patient reassessed at: 4/15/18 at 1515    Upon arrival to floor Katie RN at bedside

## 2018-04-15 NOTE — HOSPITAL COURSE
Pt was admitted from [facility] with HTN emergency and SOB after missing dialysis. She received HD 4/14. She was placed on cardene infusion and resumed on all home meds. Cardene infusion has been discontinued. She is stable for transfer to telemetry floor.  The patient was transferred to the floor on 4/15.  Her BP was adequately controlled.  On the day of discharge- she was resting comfortably on RA and requesting discharge.  She is a T, TH,Sat dialysis patient and will resume this schedule.  Activity as tolerated. Diet- low NA. Resumed all home meds. No new medications. Follow up with PCP in one week.

## 2018-04-15 NOTE — CARE UPDATE
Transfer Note    26 yo female with liver transplant (1992), renovascular HTN, anemia of chronic disease, secondary hyperparathyroidism, seizure disorder, and severe protein malnutrition presented from [place] ED with report of SOB. Pt last HD was on 4/10. She went on 4/12 to her scheduled HD session but nurse did not feel comfortable using access with pus coming from site and sent her to ED. Pt admitted to ICU for emergent HD and HTN control. Cardene off 4/15, and resumed home meds. Needs continued titration of meds for tighter Bp control. Pt has outpatient follow up appt with vascular surgery regarding HD access. On prednisone and prograf for Liver transplant. Follow up [facility] main transplant and nephrology, vascular surgery to eval HD access.

## 2018-04-15 NOTE — NURSING
CarlRN(dialysis nurse) at bedside providing patient's dialysis. Patient scheduled for a 4hr HD treatment per . No signs of acute distress. Patient AAOx4. VS WNL. Assessment noted. Will continue to monitor.

## 2018-04-15 NOTE — PROGRESS NOTES
4 hour intermittent hemodialysis tx completed as ordered. , Net UF = 3657ml; no issues during cannulation, and/or with hd tx . Tolerated tx well.

## 2018-04-15 NOTE — PROGRESS NOTES
"Holly Patel is a 27 y.o. female patient.    Active Hospital Problems    Diagnosis  POA    *Hypertensive emergency [I16.1]  Yes    Pancytopenia [D61.818]  Yes    Seizures [R56.9]  Yes    Moderate protein-calorie malnutrition [E44.0]  Yes    Secondary hyperparathyroidism [N25.81]  Yes    Anemia in ESRD (end-stage renal disease) [N18.6, D63.1]  Yes     Chronic    ESRD on hemodialysis [N18.6, Z99.2]  Not Applicable     Chronic   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 [Z94.4]  Not Applicable     Chronic     hemangioendothelioma s/p LTx (1992)        Resolved Hospital Problems    Diagnosis Date Resolved POA   No resolved problems to display.     Temp: 98.4 °F (36.9 °C) (04/15/18 1115)  Pulse: 94 (04/15/18 1100)  Resp: (!) 29 (04/15/18 1100)  BP: (!) 157/108 (04/15/18 1100)  SpO2: 100 % (04/15/18 1100)  Weight: 53.4 kg (117 lb 11.6 oz) (04/15/18 0354)  Height: 5' 4" (162.6 cm) (04/15/18 0420)    Subjective:  Symptoms:  Stable.  No shortness of breath.      Objective:  General Appearance:  Comfortable, in no acute distress and not in pain.    Vital signs: (most recent): Blood pressure (!) 157/108, pulse 94, temperature 98.4 °F (36.9 °C), resp. rate (!) 29, height 5' 4" (1.626 m), weight 53.4 kg (117 lb 11.6 oz), last menstrual period 03/23/2018, SpO2 100 %, not currently breastfeeding.    HEENT: Normal HEENT exam.    Lungs:  Breath sounds clear to auscultation.    Heart: S1 normal and S2 normal.    Abdomen: Abdomen is soft.  Bowel sounds are normal.   There is no abdominal tenderness.     Extremities: There is no dependent edema.  (Avf arm with thrill)  Skin:  Warm and dry.      Intake/Output - Last 3 Shifts       04/13 0700 - 04/14 0659 04/14 0700 - 04/15 0659 04/15 0700 - 04/16 0659    P.O.  360     I.V. (mL/kg)  278.5 (5.2) 110.8 (2.1)    Other  500     Total Intake(mL/kg)  1138.5 (21.3) 110.8 (2.1)    Urine (mL/kg/hr)  0 (0)     Emesis/NG output  0 (0)     Other  4157 (3.2)     Stool  0 (0)     Blood  0 (0)  "    Total Output   4157      Net   -3018.5 +110.8           Urine Occurrence  1 x         Lab Results   Component Value Date    WBC 3.59 (L) 04/15/2018    HGB 7.7 (L) 04/15/2018    HCT 23.2 (L) 04/15/2018    MCV 80 (L) 04/15/2018    PLT 81 (L) 04/15/2018     BMP  Lab Results   Component Value Date     (L) 04/15/2018    K 4.2 04/15/2018    CL 99 04/15/2018    CO2 24 04/15/2018    BUN 24 (H) 04/15/2018    CREATININE 6.0 (H) 04/15/2018    CALCIUM 9.3 04/15/2018    ANIONGAP 10 04/15/2018    ESTGFRAFRICA 10 (A) 04/15/2018    EGFRNONAA 9 (A) 04/15/2018     Current Facility-Administered Medications   Medication    0.9%  NaCl infusion (for blood administration)    0.9%  NaCl infusion    0.9%  NaCl infusion    acetaminophen tablet 650 mg    albumin human 25% bottle 25 g    cinacalcet tablet 30 mg    cloNIDine 0.2 mg/24 hr td ptwk 1 patch    dextrose 50% injection 12.5 g    dextrose 50% injection 25 g    famotidine tablet 20 mg    glucagon (human recombinant) injection 1 mg    glucose chewable tablet 16 g    glucose chewable tablet 24 g    hydrALAZINE tablet 50 mg    labetalol tablet 400 mg    lacosamide tablet 50 mg    levETIRAcetam tablet 500 mg    NIFEdipine 24 hr tablet 60 mg    predniSONE tablet 1 mg    sodium chloride 0.9% flush 5 mL    tacrolimus capsule 7 mg    vitamin renal formula (B-complex-vitamin c-folic acid) 1 mg per capsule 1 capsule       Assessment & Plan  1.ESRD.  Cont hd TTS.  2.HTN. REsume bp meds. Decrease labetalol to bid.  3.Anemia 2nd to esrd. Can get her micera at her unit.  4.Liver tx. Resume her prograf.  5.Malfxn of avf. REcommend dc button hole. No pus or fever noted.  6.Hyperphospatemia. Cont tx.  Summer Rosado MD  4/15/2018

## 2018-04-16 VITALS
TEMPERATURE: 98 F | HEART RATE: 83 BPM | RESPIRATION RATE: 18 BRPM | SYSTOLIC BLOOD PRESSURE: 158 MMHG | OXYGEN SATURATION: 99 % | BODY MASS INDEX: 19.23 KG/M2 | WEIGHT: 112.63 LBS | DIASTOLIC BLOOD PRESSURE: 98 MMHG | HEIGHT: 64 IN

## 2018-04-16 LAB
ANION GAP SERPL CALC-SCNC: 11 MMOL/L
BUN SERPL-MCNC: 47 MG/DL
CALCIUM SERPL-MCNC: 8.2 MG/DL
CHLORIDE SERPL-SCNC: 102 MMOL/L
CO2 SERPL-SCNC: 24 MMOL/L
CREAT SERPL-MCNC: 8.6 MG/DL
EST. GFR  (AFRICAN AMERICAN): 7 ML/MIN/1.73 M^2
EST. GFR  (NON AFRICAN AMERICAN): 6 ML/MIN/1.73 M^2
GLUCOSE SERPL-MCNC: 93 MG/DL
MAGNESIUM SERPL-MCNC: 2.3 MG/DL
PHOSPHATE SERPL-MCNC: 5.9 MG/DL
POTASSIUM SERPL-SCNC: 4.5 MMOL/L
SODIUM SERPL-SCNC: 137 MMOL/L
TACROLIMUS BLD-MCNC: <1.5 NG/ML

## 2018-04-16 PROCEDURE — 63600175 PHARM REV CODE 636 W HCPCS: Performed by: HOSPITALIST

## 2018-04-16 PROCEDURE — 36415 COLL VENOUS BLD VENIPUNCTURE: CPT

## 2018-04-16 PROCEDURE — 25000003 PHARM REV CODE 250: Performed by: INTERNAL MEDICINE

## 2018-04-16 PROCEDURE — 84100 ASSAY OF PHOSPHORUS: CPT

## 2018-04-16 PROCEDURE — 80048 BASIC METABOLIC PNL TOTAL CA: CPT

## 2018-04-16 PROCEDURE — 25000003 PHARM REV CODE 250: Performed by: HOSPITALIST

## 2018-04-16 PROCEDURE — 83735 ASSAY OF MAGNESIUM: CPT

## 2018-04-16 RX ORDER — HYDRALAZINE HYDROCHLORIDE 50 MG/1
100 TABLET, FILM COATED ORAL EVERY 8 HOURS
Qty: 90 TABLET | Refills: 5 | Status: SHIPPED | OUTPATIENT
Start: 2018-04-16 | End: 2018-05-19 | Stop reason: SDUPTHER

## 2018-04-16 RX ORDER — HYDRALAZINE HYDROCHLORIDE 25 MG/1
50 TABLET, FILM COATED ORAL ONCE
Status: COMPLETED | OUTPATIENT
Start: 2018-04-16 | End: 2018-04-16

## 2018-04-16 RX ADMIN — LACOSAMIDE 50 MG: 50 TABLET, FILM COATED ORAL at 09:04

## 2018-04-16 RX ADMIN — TACROLIMUS 7 MG: 1 CAPSULE ORAL at 09:04

## 2018-04-16 RX ADMIN — LABETALOL HYDROCHLORIDE 400 MG: 100 TABLET, FILM COATED ORAL at 09:04

## 2018-04-16 RX ADMIN — Medication 1 CAPSULE: at 09:04

## 2018-04-16 RX ADMIN — HYDRALAZINE HYDROCHLORIDE 50 MG: 25 TABLET ORAL at 01:04

## 2018-04-16 RX ADMIN — CINACALCET HYDROCHLORIDE 30 MG: 30 TABLET, COATED ORAL at 09:04

## 2018-04-16 RX ADMIN — LEVETIRACETAM 500 MG: 500 TABLET, FILM COATED ORAL at 09:04

## 2018-04-16 RX ADMIN — NIFEDIPINE 60 MG: 30 TABLET, FILM COATED, EXTENDED RELEASE ORAL at 09:04

## 2018-04-16 RX ADMIN — FAMOTIDINE 20 MG: 20 TABLET, FILM COATED ORAL at 09:04

## 2018-04-16 RX ADMIN — HYDRALAZINE HYDROCHLORIDE 50 MG: 25 TABLET ORAL at 06:04

## 2018-04-16 NOTE — NURSING
Patient discharged home vitals stable see flowsheet for vitals. Transported by wheelchair to her family members car. No seizure or acute changes. Old iv site clean and dry without signs of inflammation or streaking.

## 2018-04-16 NOTE — NURSING
Telemetry removed . Iv site clean dry and intact no swelling or inflammation. Patient informed she has discharge orders with arrangements to be made for home health per . Patient also informed has orders to go to dialysis spoke with renae in dialysis patient has eaten and i've given her medications blood pressure well controlled.

## 2018-04-16 NOTE — ASSESSMENT & PLAN NOTE
Cardene off  Resume home meds and titrate for tighter BP control  Pt counseled on medication compliance   Resolved issue. From missing dialysis.

## 2018-04-16 NOTE — NURSING
Rechecked vitals blood pressure within defined limits patient ok to go home she is waiting on her ride home. No acute distress.

## 2018-04-16 NOTE — PLAN OF CARE
"   04/16/18 1111   Final Note   Assessment Type Final Discharge Note   Discharge Disposition Home-Health   What phone number can be called within the next 1-3 days to see how you are doing after discharge? [phone]   Hospital Follow Up  Appt(s) scheduled? Yes   Discharge plans and expectations educations in teach back method with documentation complete? Yes   Discharge/Hospital Encounter Summary to (non-[facility]) PCP No   Referral to / orders for Home Health Complete? Yes   Did you assess the readiness or willingness of the family or caregiver to support self management of care? Yes     SW met with pt to discuss discharge planning. Pt informed SW, she lives with her mother, Myrna, and mother helps her at home. Pt informed SW she prefers morning appointments. SW contacted [facility] @ [phone] to schedule PCP appointment. OFELIA spoke with Bonnie. According to Bonnie, Dr. Amor who is listed as pt PCP, is a hospitalist at [facility] and does not see pt's in clinic. Bonnie scheduled appointment to see Dr. Vera on 4/16 @ 8:40AM.     EDUCATION:  Pt provided with educational information on Hypertension.  Information reviewed and placed in :My Healthcare Packet" to be brought home for  use as resource after discharge.  Information included:  signs and symptoms to look for at discharge. SW instructed pt to call the doctor if experiencing symptoms that may indicate a medical emergency: CALL 911 if signs and symptoms worsen. SW asked pt to provide SW with two signs and symptoms recently discussed.  Pt stated headaches, high blood pressure, and chest pain.     Reminded pt things she will be responsible for to manage her healthcare at home: getting Rx filled, attending follow up appointments, and taking medication as prescribed were discussed.   Teach back method used.  All questions answered.  Patient verbalized understanding of all information.      AILYN Sher ([facility] ), informed SW, pt has [facility]. Pt confirmed MEGAN, and " informed she would like to contiune. HH orders pulled by Scott, and pt placed on HH board. SW informed pt nurse all CM needs have been met.

## 2018-04-16 NOTE — SUBJECTIVE & OBJECTIVE
Interval History: No new issues.     Review of Systems   Constitutional: Negative for activity change, chills, diaphoresis and fatigue.   Respiratory: Negative for chest tightness and shortness of breath.    Cardiovascular: Negative for chest pain.   Gastrointestinal: Negative for abdominal pain.   Genitourinary: Negative for difficulty urinating.     Objective:     Vital Signs (Most Recent):  Temp: 97.7 °F (36.5 °C) (04/16/18 0441)  Pulse: 86 (04/16/18 0441)  Resp: 16 (04/16/18 0441)  BP: (!) 149/93 (04/16/18 0441)  SpO2: 100 % (04/16/18 0441) Vital Signs (24h Range):  Temp:  [97.7 °F (36.5 °C)-98.7 °F (37.1 °C)] 97.7 °F (36.5 °C)  Pulse:  [] 86  Resp:  [16-31] 16  SpO2:  [90 %-100 %] 100 %  BP: (129-179)/() 149/93     Weight: 51.1 kg (112 lb 10.5 oz)  Body mass index is 19.34 kg/m².    Intake/Output Summary (Last 24 hours) at 04/16/18 0713  Last data filed at 04/15/18 1300   Gross per 24 hour   Intake           685.83 ml   Output                0 ml   Net           685.83 ml      Physical Exam   Constitutional: She appears well-developed and well-nourished.   HENT:   Head: Normocephalic and atraumatic.   Pulmonary/Chest: Breath sounds normal. No respiratory distress. She has no wheezes. She has no rales.   Neurological: She is alert.   Psychiatric: She has a normal mood and affect. Her behavior is normal.   Vitals reviewed.      Significant Labs:   BMP:   Recent Labs  Lab 04/16/18  0536   GLU 93      K 4.5      CO2 24   BUN 47*   CREATININE 8.6*   CALCIUM 8.2*   MG 2.3     CBC:   Recent Labs  Lab 04/15/18  0353   WBC 3.59*   HGB 7.7*   HCT 23.2*   PLT 81*       Significant Imaging:

## 2018-04-16 NOTE — PROGRESS NOTES
Patient transferred to the unit from ICU accompanied by family. Patient AAOx4. Respirations even and unlabored on room air with clear breath sounds throughout. SR on telemetry. +BS x 4. No edema noted. +2 peripheral pulses noted. 20 gauge saline lock to R. Hand with no redness or swelling noted. Left FA fistula with continuous thrill and bruit noted. Vital signs stable. Denies any pain. Patient ambulated to bed from wheelchair and steady gait noted. Educated on fall precautions. Bed low and locked, side rails up x 2, call light in reach, and non-skid socks in use. Monitoring.

## 2018-04-16 NOTE — NURSING
Prescription for hydralazine 100 mg every 8 hours to be started today prescription given to patient voiced understanding . Called dr combs back new order to given  50 mg dose of hydralazine now and recheck pressure. avs booklet reviewed with patient voiced understanding and need to check her blood pressure and to keep her dialysis appointments.

## 2018-04-16 NOTE — PROGRESS NOTES
Follow-up Information     Jt Vera MD On 4/17/2018.    Specialty:  Family Medicine  Why:  Outpatient Services, PCP follow-up appointment. Patient should arrive by 8:40AM.   Contact information:  [address]  [place] [address]  [phone]             [facility] [place].    Specialty:  Home Health Services  Why:  Home Health  Contact information:  [address]  [address]  [place] [id number] [phone]               PLEASE BRING TO ALL FOLLOW UP APPOINTMENTS:   A COPY YOUR DISCHARGE INSTRUCTIONS, Any new MEDICINES YOU ARE CURRENTLY TAKING IN THEIR ORIGINAL BOTTLES  And IDENTIFICATION AND INSURANCE CARD     **PLEASE ARRIVE 15 MINUTES AHEAD OF SCHEDULED APPOINTMENT TIME   ++PLEASE CALL 24 HOURS IN ADVANCE IF YOU MUST RESCHEDULE YOUR APPOINTMENT DAY AND/OR TIME     Help at Home [phone]  After discharge for assistance [facility] On Call Nurse Care Line 24/7 Assistance     Things You are responsible For To Manage Your Care At Home:  1.    Getting your prescriptions filled   2.    Taking your medications as directed, DO NOT MISS ANY DOSES!  3.    Going to your follow-up doctor appointment. This is important because it  allow the doctor to monitor your progress and determine if  any changes need to made to your treatment plan.     Thank you for choosing [facility] for your care.  Please answer any calls you may receive from [facility] we want to continue to support you as you manage your healthcare needs. [facility] is happy to have the opportunity to serve you.      Again, Thank you for allowing me to help with your discharge planning and choosing [facility] as your healthcare provider.     Cookie Gibson, BS, MSW, CSW  [phone]  Care Management

## 2018-04-16 NOTE — PLAN OF CARE
[facility]    HOME HEALTH ORDERS  FACE TO FACE ENCOUNTER    Patient Name: Holly Patel  YOB: 1990    PCP: Galindo Amor MD   PCP Address: [address] / [place] [address]  PCP Phone Number: [phone]  PCP Fax: [phone]    Encounter Date: 04/16/2018    Admit to Home Health    Diagnoses:  Active Hospital Problems    Diagnosis  POA    *Hypertensive emergency [I16.1]  Yes     Priority: 1 - High    Pancytopenia [D61.818]  Yes    Seizures [R56.9]  Yes    Moderate protein-calorie malnutrition [E44.0]  Yes    Secondary hyperparathyroidism [N25.81]  Yes    Anemia in ESRD (end-stage renal disease) [N18.6, D63.1]  Yes     Chronic    ESRD on hemodialysis [N18.6, Z99.2]  Not Applicable     Chronic   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 [Z94.4]  Not Applicable     Chronic     hemangioendothelioma s/p LTx (1992)        Resolved Hospital Problems    Diagnosis Date Resolved POA   No resolved problems to display.       Future Appointments  Date Time Provider Department Center   4/18/2018 3:00 PM VASCULAR, LAB [facility] VASCLAB Herm[facility]   4/18/2018 3:45 PM Idalia Diaz MD [facility] VASCSUR [facility]   4/23/2018 9:00 AM LAB, [facility]   4/30/2018 11:15 AM Neelam Marroquin MD [facility] [id number]   5/18/2018 10:20 AM Stan Sosa MD [facility] IM [facility] PCW   7/2/2018 1:40 PM Michael Mendoza MD [facility] NEURO [facility]     Follow-up Information     Galindo Amor MD In 1 week.    Specialty:  Hospitalist  Contact information:  [address] [place] [address]  [phone]                     I have seen and examined this patient face to face today. My clinical findings that support the need for the home health skilled services and home bound status are the following:  Patient with medication mismanagement issues requiring home bound status as evidenced by  Poor understanding of medication regimen/dosage.    Allergies:  Review of patient's allergies  indicates:   Allergen Reactions    Chloral hydrate      Other reaction(s): Hallucinations  Other reaction(s): Hives    Hydrocodone Other (See Comments)     Mental status changes       Diet: 2 gram sodium diet    Activities: activity as tolerated    Nursing:   SN to complete comprehensive assessment including routine vital signs. Instruct on disease process and s/s of complications to report to MD. Review/verify medication list sent home with the patient at time of discharge  and instruct patient/caregiver as needed. Frequency may be adjusted depending on start of care date.    Notify MD if SBP > 160 or < 90; DBP > 90 or < 50; HR > 120 or < 50; Temp > 101; Other:         CONSULTS:    Aide to provide assistance with personal care, ADLs, and vital signs.    Medications: Review discharge medications with patient and family and provide education.      Current Discharge Medication List      CONTINUE these medications which have NOT CHANGED    Details   aspirin 81 MG Chew Take 1 tablet (81 mg total) by mouth once daily.  Refills: 0      cinacalcet (SENSIPAR) 30 MG Tab Take 1 tablet (30 mg total) by mouth daily with breakfast.  Qty: 30 tablet, Refills: 11      cloNIDine 0.2 mg/24 hr td ptwk (CATAPRES) 0.2 mg/24 hr Place 1 patch onto the skin every 7 days. Change every Wednesday  Qty: 4 patch, Refills: 11    Associated Diagnoses: Hypertensive emergency; Uncontrolled hypertension; Renovascular hypertension      famotidine (PEPCID) 20 MG tablet Take 1 tablet (20 mg total) by mouth 2 (two) times daily.  Qty: 20 tablet, Refills: 0      food supplemt, lactose-reduced (ENSURE ACTIVE HIGH PROTEIN) Liqd Take 236 mLs by mouth 2 (two) times daily.  Qty: 60 Can, Refills: 6    Associated Diagnoses: Liver replaced by transplant; ESRD on hemodialysis; Iron deficiency anemia secondary to inadequate dietary iron intake; Moderate protein-calorie malnutrition      hydrALAZINE (APRESOLINE) 50 MG tablet Take 1 tablet (50 mg total) by mouth  every 8 (eight) hours.  Qty: 270 tablet, Refills: 11    Associated Diagnoses: Hypertensive emergency; Uncontrolled hypertension; Renovascular hypertension      labetalol (NORMODYNE) 200 MG tablet Take 2 tablets (400 mg total) by mouth every 8 (eight) hours.  Qty: 360 tablet, Refills: 11    Associated Diagnoses: Hypertensive emergency; Uncontrolled hypertension; Renovascular hypertension      lacosamide (VIMPAT) 50 mg Tab Take 1 tablet (50 mg total) by mouth every 12 (twelve) hours.  Qty: 60 tablet, Refills: 0      levETIRAcetam (KEPPRA) 500 MG Tab Take 1 tablet (500 mg total) by mouth 2 (two) times daily.  Qty: 60 tablet, Refills: 0      NIFEdipine (PROCARDIA-XL) 60 MG (OSM) 24 hr tablet Take 1 tablet (60 mg total) by mouth once daily.  Qty: 30 tablet, Refills: 0      ondansetron (ZOFRAN) 4 MG tablet Take 1 tablet (4 mg total) by mouth every 6 (six) hours.  Qty: 12 tablet, Refills: 0      oxyCODONE (ROXICODONE) 5 MG immediate release tablet Take 1 tablet (5 mg total) by mouth every 4 (four) hours as needed for Pain.  Qty: 12 tablet, Refills: 0      predniSONE (DELTASONE) 1 MG tablet Take 1 mg by mouth every other day.      tacrolimus (PROGRAF) 1 MG Cap Take 7 capsules (7 mg total) by mouth every 12 (twelve) hours.  Qty: 420 capsule, Refills: 11    Associated Diagnoses: Liver transplanted      triamcinolone acetonide 0.1% (KENALOG) 0.1 % ointment AAA on arms, legs, and neck bid x 1-2 wks then prn flares only  Qty: 80 g, Refills: 3    Associated Diagnoses: Atopic dermatitis             I certify that this patient is confined to her home and needs intermittent skilled nursing care.

## 2018-04-16 NOTE — PLAN OF CARE
04/16/18 1108   Discharge Assessment   Assessment information obtained from? Patient     Pt discharged before assessment completed.

## 2018-04-16 NOTE — NURSING
Assumed care from Katie Fernandez. Pt is supine in bed with HOB elevated. Family at bedside. No distress noted. Breathing is equal and unlabored. Bed is locked, lowered, and alarmed. Call light is within reach. Will continue to monitor.

## 2018-04-16 NOTE — PLAN OF CARE
Problem: Hypertensive Disease/Crisis (Arterial) (Adult)  Intervention: Cluster Activity/Decrease Environmental Stimulation   04/15/18 1515   Cognitive Interventions   Sensory Stimulation Regulation care clustered;quiet environment promoted     Intervention: Promote Rest/Minimize Oxygen Consumption   04/14/18 1901 04/15/18 2200   Activity   Activity Type --  up ad russ   Coping/Psychosocial Interventions   Environmental Support calm environment promoted --

## 2018-04-16 NOTE — PROGRESS NOTES
[facility] Medicine  Progress Note    Patient Name: Holly Patel  MRN: [id number]  Patient Class: IP- Inpatient   Admission Date: 4/14/2018  Length of Stay: 2 days  Attending Physician: Aren Hale MD  Primary Care Provider: Galindo Amor MD        Subjective:     Principal Problem:Hypertensive emergency    HPI:  26 yo female with liver transplant (1992), renovascular HTN, anemia of chronic disease, secondary hyperparathyroidism, seizure disorder, and severe protein malnutrition presented from [place] ED with report of SOB. There is no ED documentation to review from [place]. Apparently blood pressure uncontrolled and requiring cardene infusion. Pt last HD was on 4/10. She went on 4/12 to her scheduled HD session but nurse did not feel comfortable using access with pus coming from site and sent her to ED. She saw vascular surgery at [facility] [place] and was discharged home and ok to use HD access. No pus present today but missed HD and symptomatic. Pt admitted to ICU for emergent HD and HTN control. The patient clinically improved and was sent to the floor on 4/15.     Hospital Course:  Pt was admitted from [place] ED with HTN emergency and SOB. She received HD 4/14. She was placed on cardene infusion and resumed on all home meds. Cardene infusion has been discontinued. She is stable for transfer to telemetry floor. She needs further titration and tighter control of BP. Ok for transfer to tele floor. Nephrology following.     Of note, called after hours Organ transplant service. Dr. Hanson confirmed Prednisone 1mg every other day and Prograf 7mg BID. Prograf level pending.     Interval History: No new issues.     Review of Systems   Constitutional: Negative for activity change, chills, diaphoresis and fatigue.   Respiratory: Negative for chest tightness and shortness of breath.    Cardiovascular: Negative for chest pain.   Gastrointestinal: Negative for abdominal pain.    Genitourinary: Negative for difficulty urinating.     Objective:     Vital Signs (Most Recent):  Temp: 97.7 °F (36.5 °C) (04/16/18 0441)  Pulse: 86 (04/16/18 0441)  Resp: 16 (04/16/18 0441)  BP: (!) 149/93 (04/16/18 0441)  SpO2: 100 % (04/16/18 0441) Vital Signs (24h Range):  Temp:  [97.7 °F (36.5 °C)-98.7 °F (37.1 °C)] 97.7 °F (36.5 °C)  Pulse:  [] 86  Resp:  [16-31] 16  SpO2:  [90 %-100 %] 100 %  BP: (129-179)/() 149/93     Weight: 51.1 kg (112 lb 10.5 oz)  Body mass index is 19.34 kg/m².    Intake/Output Summary (Last 24 hours) at 04/16/18 0713  Last data filed at 04/15/18 1300   Gross per 24 hour   Intake           685.83 ml   Output                0 ml   Net           685.83 ml      Physical Exam   Constitutional: She appears well-developed and well-nourished.   HENT:   Head: Normocephalic and atraumatic.   Pulmonary/Chest: Breath sounds normal. No respiratory distress. She has no wheezes. She has no rales.   Neurological: She is alert.   Psychiatric: She has a normal mood and affect. Her behavior is normal.   Vitals reviewed.      Significant Labs:   BMP:   Recent Labs  Lab 04/16/18  0536   GLU 93      K 4.5      CO2 24   BUN 47*   CREATININE 8.6*   CALCIUM 8.2*   MG 2.3     CBC:   Recent Labs  Lab 04/15/18  0353   WBC 3.59*   HGB 7.7*   HCT 23.2*   PLT 81*       Significant Imaging:    Assessment/Plan:      * Hypertensive emergency    Cardene off  Resume home meds and titrate for tighter BP control  Pt counseled on medication compliance   Resolved issue. From missing dialysis.           Pancytopenia    Secondary to immunosuppressants  Monitor outpatient with transplant   Hold heparin           Seizures    resume vimpat and keppra  No acute issues            Elevated troponin              Moderate protein-calorie malnutrition    Renal dietary modifications with supplementation           Secondary hyperparathyroidism    Resume home meds, HD per nephrology           Anemia in ESRD  (end-stage renal disease)    Chronically low, assume epogen given with HD  Blood transfusion cancelled   Hemoglobin up 7.7           Renovascular hypertension              ESRD on hemodialysis    Admitted to ICU for emergent HD  Nephrology consulted  Renal diet  VS consult for access evaluation           Liver replaced by transplant    On immunosuppressants   Check prograf level  Resume prednisone, 1 mg every other day  Prograf 7mg BID  Follow up [facility] main             VTE Risk Mitigation         Ordered     Place sequential compression device  Until discontinued      04/14/18 1104     Place LINDA hose  Until discontinued      04/14/18 1104     IP VTE LOW RISK PATIENT  Once      04/14/18 1104        Will discharge to home.         Aren Parada MD  Department of Hospital Medicine   [facility]

## 2018-04-16 NOTE — NURSING
Rechecked blood pressure systolic and diastolic elevated paged dr combs informed of blood pressure he is placing orders now instructed to recheck blood pressure in two hours.

## 2018-04-16 NOTE — DISCHARGE SUMMARY
[facility] Hospital Medicine  Discharge Summary      Patient Name: Holly Patel  MRN: [id number]  Admission Date: 4/14/2018  Hospital Length of Stay: 2 days  Discharge Date and Time:  04/16/2018 7:22 AM  Attending Physician: Aren Hale MD   Discharging Provider: Aren Hale MD  Primary Care Provider: Galindo Amor MD      HPI:   28 yo female with liver transplant (1992), renovascular HTN, anemia of chronic disease, secondary hyperparathyroidism, seizure disorder, and severe protein malnutrition presented from [place] ED with report of SOB. There is no ED documentation to review from [place]. Apparently blood pressure uncontrolled and requiring cardene infusion. Pt last HD was on 4/10. She went on 4/12 to her scheduled HD session but nurse did not feel comfortable using access with pus coming from site and sent her to ED. She saw vascular surgery at [facility] main ED and was discharged home and ok to use HD access. No pus present today but missed HD and symptomatic. Pt admitted to ICU for emergent HD and HTN control. The patient clinically improved and was sent to the floor on 4/15.     * No surgery found *      Hospital Course:   Pt was admitted from [place] ED with HTN emergency and SOB after missing dialysis. She received HD 4/14. She was placed on cardene infusion and resumed on all home meds. Cardene infusion has been discontinued. She is stable for transfer to telemetry floor.  The patient was transferred to the floor on 4/15.  Her BP was adequately controlled.  On the day of discharge- she was resting comfortably on RA and requesting discharge.  She is a T, TH,Sat dialysis patient and will resume this schedule.  Activity as tolerated. Diet- low NA. Resumed all home meds. No new medications. Follow up with PCP in one week.         Consults:   Consults         Status Ordering Provider     Inpatient consult to Nephrology  Once     Provider:  Summer Rosado MD    Completed  JI DAWN JR          No new Assessment & Plan notes have been filed under this hospital service since the last note was generated.  Service: Hospital Medicine    Final Active Diagnoses:    Diagnosis Date Noted POA    PRINCIPAL PROBLEM:  Hypertensive emergency [I16.1] 04/14/2018 Yes    Pancytopenia [D61.818] 04/14/2018 Yes    Seizures [R56.9]  Yes    Moderate protein-calorie malnutrition [E44.0] 08/16/2017 Yes    Secondary hyperparathyroidism [N25.81] 08/05/2017 Yes    Anemia in ESRD (end-stage renal disease) [N18.6, D63.1] 10/12/2015 Yes     Chronic    ESRD on hemodialysis [N18.6, Z99.2] 09/30/2015 Not Applicable     Chronic   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 [Z94.4] 09/10/2012 Not Applicable     Chronic      Problems Resolved During this Admission:    Diagnosis Date Noted Date Resolved POA       Discharged Condition: good    Disposition: Home or Self Care    Follow Up:  Follow-up Information     Galindo Amor MD In 1 week.    Specialty:  Hospitalist  Contact information:  [address] [place] [id number] [phone]                 Patient Instructions:     Diet Cardiac     Activity as tolerated         Significant Diagnostic Studies:     Pending Diagnostic Studies:     Procedure Component Value Units Date/Time    Tacrolimus level [[id number]] Collected:  04/15/18 0852    Order Status:  Sent Lab Status:  In process Updated:  04/15/18 1206    Specimen:  Blood from Blood          Medications:  Reconciled Home Medications:      Medication List      CHANGE how you take these medications    triamcinolone acetonide 0.1% 0.1 % ointment  Commonly known as:  KENALOG  AAA on arms, legs, and neck bid x 1-2 wks then prn flares only  What changed:  additional instructions        CONTINUE taking these medications    aspirin 81 MG Chew  Take 1 tablet (81 mg total) by mouth once daily.     cinacalcet 30 MG Tab  Commonly known as:  SENSIPAR  Take 1 tablet (30 mg total) by mouth daily with breakfast.      cloNIDine 0.2 mg/24 hr td ptwk 0.2 mg/24 hr  Commonly known as:  CATAPRES  Place 1 patch onto the skin every 7 days. Change every Wednesday     famotidine 20 MG tablet  Commonly known as:  PEPCID  Take 1 tablet (20 mg total) by mouth 2 (two) times daily.     food supplemt, lactose-reduced Liqd  Commonly known as:  ENSURE ACTIVE HIGH PROTEIN  Take 236 mLs by mouth 2 (two) times daily.     hydrALAZINE 50 MG tablet  Commonly known as:  APRESOLINE  Take 1 tablet (50 mg total) by mouth every 8 (eight) hours.     labetalol 200 MG tablet  Commonly known as:  NORMODYNE  Take 2 tablets (400 mg total) by mouth every 8 (eight) hours.     lacosamide 50 mg Tab  Commonly known as:  VIMPAT  Take 1 tablet (50 mg total) by mouth every 12 (twelve) hours.     levETIRAcetam 500 MG Tab  Commonly known as:  KEPPRA  Take 1 tablet (500 mg total) by mouth 2 (two) times daily.     NIFEdipine 60 MG (OSM) 24 hr tablet  Commonly known as:  PROCARDIA-XL  Take 1 tablet (60 mg total) by mouth once daily.     ondansetron 4 MG tablet  Commonly known as:  ZOFRAN  Take 1 tablet (4 mg total) by mouth every 6 (six) hours.     oxyCODONE 5 MG immediate release tablet  Commonly known as:  ROXICODONE  Take 1 tablet (5 mg total) by mouth every 4 (four) hours as needed for Pain.     predniSONE 1 MG tablet  Commonly known as:  DELTASONE  Take 1 mg by mouth every other day.     tacrolimus 1 MG Cap  Commonly known as:  PROGRAF  Take 7 capsules (7 mg total) by mouth every 12 (twelve) hours.            Indwelling Lines/Drains at time of discharge:   Lines/Drains/Airways     Drain                 Hemodialysis AV Fistula Left forearm -- days                 < 30  Minutes spent on discharge     Patient was seen and examined on the date of discharge and determined to be suitable for discharge.         Aren Parada MD  Department of Hospital Medicine  [facility]

## 2018-04-16 NOTE — NURSING
Patient transported back to floor she did not have hemodialysis tx per dr morales she will go at her usual schedule . No acute distress or changes.

## 2018-04-16 NOTE — NURSING
Bedside rounding report received from vasiliy kong rn on patients progress and updated handoff report sheet received. Assessment completed and plan of care discussed and written on board with patient. Head of bed elevated side rails up x 3 call light in reach and niece at bedside too. No acute distress.

## 2018-04-17 LAB
BLD PROD TYP BPU: NORMAL
BLOOD UNIT EXPIRATION DATE: NORMAL
BLOOD UNIT TYPE CODE: 7300
BLOOD UNIT TYPE: NORMAL
CODING SYSTEM: NORMAL
DISPENSE STATUS: NORMAL
NUM UNITS TRANS PACKED RBC: NORMAL

## 2018-04-18 ENCOUNTER — HOSPITAL ENCOUNTER (OUTPATIENT)
Dept: VASCULAR SURGERY | Facility: CLINIC | Age: 28
Discharge: HOME | End: 2018-04-18
Attending: SURGERY
Payer: MEDICARE

## 2018-04-18 ENCOUNTER — OFFICE VISIT (OUTPATIENT)
Dept: PRIMARY CARE CLINIC | Facility: CLINIC | Age: 28
End: 2018-04-18
Payer: MEDICARE

## 2018-04-18 ENCOUNTER — OFFICE VISIT (OUTPATIENT)
Dept: VASCULAR SURGERY | Facility: CLINIC | Age: 28
End: 2018-04-18
Payer: MEDICARE

## 2018-04-18 VITALS
DIASTOLIC BLOOD PRESSURE: 116 MMHG | WEIGHT: 118.19 LBS | OXYGEN SATURATION: 99 % | HEART RATE: 69 BPM | SYSTOLIC BLOOD PRESSURE: 150 MMHG | BODY MASS INDEX: 19.69 KG/M2 | HEIGHT: 65 IN

## 2018-04-18 DIAGNOSIS — E44.0 MODERATE PROTEIN-CALORIE MALNUTRITION: ICD-10-CM

## 2018-04-18 DIAGNOSIS — N18.6 ESRD (END STAGE RENAL DISEASE) ON DIALYSIS: ICD-10-CM

## 2018-04-18 DIAGNOSIS — Z94.4 LIVER REPLACED BY TRANSPLANT: Chronic | ICD-10-CM

## 2018-04-18 DIAGNOSIS — N18.6 ESRD ON HEMODIALYSIS: Chronic | ICD-10-CM

## 2018-04-18 DIAGNOSIS — Z99.2 ESRD (END STAGE RENAL DISEASE) ON DIALYSIS: ICD-10-CM

## 2018-04-18 DIAGNOSIS — Z99.2 ESRD (END STAGE RENAL DISEASE) ON DIALYSIS: Primary | ICD-10-CM

## 2018-04-18 DIAGNOSIS — F43.21 SITUATIONAL DEPRESSION: ICD-10-CM

## 2018-04-18 DIAGNOSIS — I15.0 RENOVASCULAR HYPERTENSION: Primary | ICD-10-CM

## 2018-04-18 DIAGNOSIS — N18.6 ESRD (END STAGE RENAL DISEASE) ON DIALYSIS: Primary | ICD-10-CM

## 2018-04-18 DIAGNOSIS — Z99.2 ESRD ON HEMODIALYSIS: Chronic | ICD-10-CM

## 2018-04-18 PROBLEM — K11.20 SIALADENITIS: Status: RESOLVED | Noted: 2018-03-21 | Resolved: 2018-04-18

## 2018-04-18 PROBLEM — I16.1 HYPERTENSIVE EMERGENCY: Status: RESOLVED | Noted: 2018-04-14 | Resolved: 2018-04-18

## 2018-04-18 PROBLEM — N39.0 URINARY TRACT INFECTION WITHOUT HEMATURIA: Status: RESOLVED | Noted: 2018-01-03 | Resolved: 2018-04-18

## 2018-04-18 PROBLEM — R79.89 ELEVATED TROPONIN: Status: RESOLVED | Noted: 2018-02-16 | Resolved: 2018-04-18

## 2018-04-18 PROBLEM — N19 UREMIA: Status: RESOLVED | Noted: 2018-01-04 | Resolved: 2018-04-18

## 2018-04-18 PROCEDURE — 99213 OFFICE O/P EST LOW 20 MIN: CPT | Mod: S$PBB,,, | Performed by: SURGERY

## 2018-04-18 PROCEDURE — 93990 DOPPLER FLOW TESTING: CPT | Mod: 26,S$PBB,, | Performed by: SURGERY

## 2018-04-18 PROCEDURE — 93990 DOPPLER FLOW TESTING: CPT | Mod: PBBFAC | Performed by: SURGERY

## 2018-04-18 PROCEDURE — 99999 PR PBB SHADOW E&M-EST. PATIENT-LVL IV: CPT | Mod: PBBFAC,,, | Performed by: NURSE PRACTITIONER

## 2018-04-18 PROCEDURE — 99213 OFFICE O/P EST LOW 20 MIN: CPT | Mod: PBBFAC,27 | Performed by: SURGERY

## 2018-04-18 PROCEDURE — 99999 PR PBB SHADOW E&M-EST. PATIENT-LVL III: CPT | Mod: PBBFAC,,, | Performed by: SURGERY

## 2018-04-18 PROCEDURE — 99214 OFFICE O/P EST MOD 30 MIN: CPT | Mod: S$PBB,,, | Performed by: NURSE PRACTITIONER

## 2018-04-18 PROCEDURE — 99214 OFFICE O/P EST MOD 30 MIN: CPT | Mod: PBBFAC,25 | Performed by: NURSE PRACTITIONER

## 2018-04-18 RX ORDER — NIFEDIPINE 60 MG/1
60 TABLET, EXTENDED RELEASE ORAL DAILY
Qty: 1 TABLET | Refills: 0 | Status: SHIPPED | OUTPATIENT
Start: 2018-04-18 | End: 2018-05-19 | Stop reason: SDUPTHER

## 2018-04-18 RX ORDER — CLONIDINE HYDROCHLORIDE 0.1 MG/1
0.1 TABLET ORAL
Status: COMPLETED | OUTPATIENT
Start: 2018-04-18 | End: 2018-04-19

## 2018-04-18 NOTE — PATIENT INSTRUCTIONS
1) Check and document your blood pressures and bring log to next clinic appointment.     2) Call with questions.       [facility] Visit (Post Discharge Follow-up) Today:   - Our clinic physicians and nurses plan to follow the patient up for any medical issues in the [facility] for 30 days post discharge.    Future Appointments  Date Time Provider Department Center   4/18/2018 3:00 PM VASCULAR, LAB [facility] VASCL[facility] [address]   4/18/2018 3:45 PM Idalia Diaz MD [facility] VASCSUR [address]   4/23/2018 9:00 AM LAB, [facility] [place]   4/25/2018 1:00 PM SAM Huerta [facility] IMPRICL [address] [facility]   4/30/2018 11:15 AM Neelam Marroquin MD [id number]   5/18/2018 10:20 AM Stan Sosa MD [facility] IM [address] [facility]   7/2/2018 1:40 PM Michael Mendoza MD [facility]

## 2018-04-18 NOTE — PROGRESS NOTES
[facility]  Follow-up Visit Progress Note     PRESENTING HISTORY     PCP: Stan Sosa MD  Chief Complaint/Reason for Visit:  Follow up from recent visit.      No chief complaint on file.      History of Present Illness & ROS: Ms. Holly Patel is a 27 y.o. female.    Discharge Summaries     Aren Hale MD   [facility] Medicine      []Hide copied text  []Hover for attribution information  [facility] Medicine  Discharge Summary        Patient Name: Holly Patel  MRN: [id number]  Admission Date: 4/14/2018  Hospital Length of Stay: 2 days  Discharge Date and Time:  04/16/2018 7:22 AM  Attending Physician: Aren Hale MD   Discharging Provider: Aren Hale MD  Primary Care Provider: Galindo Amor MD        HPI:   28 yo female with liver transplant (1992), renovascular HTN, anemia of chronic disease, secondary hyperparathyroidism, seizure disorder, and severe protein malnutrition presented from [place] ED with report of SOB. There is no ED documentation to review from [place]. Apparently blood pressure uncontrolled and requiring cardene infusion. Pt last HD was on 4/10. She went on 4/12 to her scheduled HD session but nurse did not feel comfortable using access with pus coming from site and sent her to ED. She saw vascular surgery at [facility] main ED and was discharged home and ok to use HD access. No pus present today but missed HD and symptomatic. Pt admitted to ICU for emergent HD and HTN control. The patient clinically improved and was sent to the floor on 4/15.      * No surgery found *       Hospital Course:   Pt was admitted from [place] ED with HTN emergency and SOB after missing dialysis. She received HD 4/14. She was placed on cardene infusion and resumed on all home meds. Cardene infusion has been discontinued. She is stable for transfer to telemetry floor.  The patient was transferred to the floor on 4/15.  Her BP was adequately  controlled.  On the day of discharge- she was resting comfortably on RA and requesting discharge.  She is a T, TH,Sat dialysis patient and will resume this schedule.  Activity as tolerated. Diet- low NA. Resumed all home meds. No new medications. Follow up with PCP in one week.          Consults:          Consults          Status Ordering Provider       Inpatient consult to Nephrology  Once     Provider:  Summer Rosado MD    Completed JI DAWN JR             No new Assessment & Plan notes have been filed under this hospital service since the last note was generated.  Service: Hospital Medicine             Final Active Diagnoses:     Diagnosis Date Noted POA    PRINCIPAL PROBLEM:  Hypertensive emergency [I16.1] 04/14/2018 Yes    Pancytopenia [D61.818] 04/14/2018 Yes    Seizures [R56.9]   Yes    Moderate protein-calorie malnutrition [E44.0] 08/16/2017 Yes    Secondary hyperparathyroidism [N25.81] 08/05/2017 Yes    Anemia in ESRD (end-stage renal disease) [N18.6, D63.1] 10/12/2015 Yes       Chronic    ESRD on hemodialysis [N18.6, Z99.2] 09/30/2015 Not Applicable       Chronic   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 [Z94.4] 09/10/2012 Not Applicable       Chronic       Problems Resolved During this Admission:     Diagnosis Date Noted Date Resolved POA         Discharged Condition: good     Disposition: Home or Self Care     Follow Up:      Follow-up Information      Galindo Amor MD In 1 week.    Specialty:  Hospitalist  Contact information:  [address]  [place] [address]  [phone]                      Patient Instructions:      Diet Cardiac      Activity as tolerated          Significant Diagnostic Studies:              Pending Diagnostic Studies:      Procedure Component Value Units Date/Time     Tacrolimus level [[id number]] Collected:  04/15/18 0852     Order Status:  Sent Lab Status:  In process Updated:  04/15/18 1206     Specimen:  Blood from Blood            Medications:  Reconciled Home  Medications:       Medication List       CHANGE how you take these medications    triamcinolone acetonide 0.1% 0.1 % ointment  Commonly known as:  KENALOG  AAA on arms, legs, and neck bid x 1-2 wks then prn flares only  What changed:  additional instructions          CONTINUE taking these medications    aspirin 81 MG Chew  Take 1 tablet (81 mg total) by mouth once daily.      cinacalcet 30 MG Tab  Commonly known as:  SENSIPAR  Take 1 tablet (30 mg total) by mouth daily with breakfast.      cloNIDine 0.2 mg/24 hr td ptwk 0.2 mg/24 hr  Commonly known as:  CATAPRES  Place 1 patch onto the skin every 7 days. Change every Wednesday      famotidine 20 MG tablet  Commonly known as:  PEPCID  Take 1 tablet (20 mg total) by mouth 2 (two) times daily.      food supplemt, lactose-reduced Liqd  Commonly known as:  ENSURE ACTIVE HIGH PROTEIN  Take 236 mLs by mouth 2 (two) times daily.      hydrALAZINE 50 MG tablet  Commonly known as:  APRESOLINE  Take 1 tablet (50 mg total) by mouth every 8 (eight) hours.      labetalol 200 MG tablet  Commonly known as:  NORMODYNE  Take 2 tablets (400 mg total) by mouth every 8 (eight) hours.      lacosamide 50 mg Tab  Commonly known as:  VIMPAT  Take 1 tablet (50 mg total) by mouth every 12 (twelve) hours.      levETIRAcetam 500 MG Tab  Commonly known as:  KEPPRA  Take 1 tablet (500 mg total) by mouth 2 (two) times daily.      NIFEdipine 60 MG (OSM) 24 hr tablet  Commonly known as:  PROCARDIA-XL  Take 1 tablet (60 mg total) by mouth once daily.      ondansetron 4 MG tablet  Commonly known as:  ZOFRAN  Take 1 tablet (4 mg total) by mouth every 6 (six) hours.      oxyCODONE 5 MG immediate release tablet  Commonly known as:  ROXICODONE  Take 1 tablet (5 mg total) by mouth every 4 (four) hours as needed for Pain.      predniSONE 1 MG tablet  Commonly known as:  DELTASONE  Take 1 mg by mouth every other day.      tacrolimus 1 MG Cap  Commonly known as:  PROGRAF  Take 7 capsules (7 mg total) by mouth  every 12 (twelve) hours.                Indwelling Lines/Drains at time of discharge:       Lines/Drains/Airways            Drain                          Hemodialysis AV Fistula Left forearm -- days                      < 30  Minutes spent on discharge     Patient was seen and examined on the date of discharge and determined to be suitable for discharge.           Aren Hale MD  Department of Hospital Medicine  [facility]      Electronically signed by Aren Hale MD at 4/16/2018  7:22 AM        Admission (Discharged) on 4/14/2018            Routing History            Detailed Report           Note shared with patient       Today:   Holly presents to  today for hospital follow up. She was driven to clinic by her nephew. Does not endorse fever, chills, coughing, headaches, dizziness, chest pain or other discomforts.     Review of Systems:  Eyes: denies visual changes at this time denies floaters   ENT: no nasal congestion or sore throat  Respiratory: no cough or shorness of breath  Cardiovascular: no chest pain or palpitations  Gastrointestinal: no nausea or vomiting, no abdominal pain or change in bowel habits  Genitourinary: no hematuria or dysuria; denies frequency  Hematologic/Lymphatic: no easy bruising or lymphadenopathy  Musculoskeletal: no arthralgias or myalgias  Neurological: no seizures or tremors  Endocrine: no heat or cold intolerance      PAST HISTORY:     Past Medical History:   Diagnosis Date    Anemia in ESRD (end-stage renal disease) 10/12/2015    Chronic rejection of liver transplant 3/22/2016    Encounter for blood transfusion     ESRD on hemodialysis 9/30/2015    History of splenomegaly 4/12/2016    Immunosuppressed 8/5/2017    Iron deficiency anemia secondary to inadequate dietary iron intake 8/16/2017    She receives IV iron periodically at the Dialysis Center.   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)     Moderate protein-calorie malnutrition 2017    MRSA bacteremia 2017    Prophylactic immunotherapy 2014    Renovascular hypertension 10/2/2015    Secondary hyperparathyroidism 2017    Sialadenitis 3/21/2018    Thrombocytopenia 2016    Thrombocytopenia 2016       Past Surgical History:   Procedure Laterality Date     SECTION      x 2    LIVER BIOPSY      LIVER TRANSPLANT  1992    TUBAL LIGATION         Family History   Problem Relation Age of Onset    Hypertension Mother     Hypertension Father     Melanoma Neg Hx     Breast cancer Neg Hx     Colon cancer Neg Hx     Ovarian cancer Neg Hx        Social History     Social History    Marital status: Legally      Spouse name: N/A    Number of children: N/A    Years of education: N/A     Social History Main Topics    Smoking status: Never Smoker    Smokeless tobacco: Never Used    Alcohol use No    Drug use: No    Sexual activity: No     Other Topics Concern    Are You Pregnant Or Think You May Be? No    Breast-Feeding No     Social History Narrative    Lives 2 kids, 7 and 8, and nephew. Her mom helps with kids.       MEDICATIONS & ALLERGIES:     Current Outpatient Prescriptions on File Prior to Visit   Medication Sig Dispense Refill    aspirin 81 MG Chew Take 1 tablet (81 mg total) by mouth once daily.  0    cinacalcet (SENSIPAR) 30 MG Tab Take 1 tablet (30 mg total) by mouth daily with breakfast. 30 tablet 11    cloNIDine 0.2 mg/24 hr td ptwk (CATAPRES) 0.2 mg/24 hr Place 1 patch onto the skin every 7 days. Change every Wednesday 4 patch 11    famotidine (PEPCID) 20 MG tablet Take 1 tablet (20 mg total) by mouth 2 (two) times daily. 20 tablet 0    food supplemt, lactose-reduced (ENSURE ACTIVE HIGH PROTEIN) Liqd Take 236 mLs by mouth 2 (two) times daily. 60 Can 6    hydrALAZINE (APRESOLINE) 50 MG tablet Take 2 tablets (100 mg total) by mouth every 8 (eight) hours. 90 tablet 5    labetalol  (NORMODYNE) 200 MG tablet Take 2 tablets (400 mg total) by mouth every 8 (eight) hours. 360 tablet 11    lacosamide (VIMPAT) 50 mg Tab Take 1 tablet (50 mg total) by mouth every 12 (twelve) hours. (Patient taking differently: Take 50 mg by mouth every 12 (twelve) hours. ) 60 tablet 0    levETIRAcetam (KEPPRA) 500 MG Tab Take 1 tablet (500 mg total) by mouth 2 (two) times daily. 60 tablet 0    NIFEdipine (PROCARDIA-XL) 60 MG (OSM) 24 hr tablet Take 1 tablet (60 mg total) by mouth once daily. (Patient taking differently: Take 60 mg by mouth once daily. ) 30 tablet 0    ondansetron (ZOFRAN) 4 MG tablet Take 1 tablet (4 mg total) by mouth every 6 (six) hours. 12 tablet 0    oxyCODONE (ROXICODONE) 5 MG immediate release tablet Take 1 tablet (5 mg total) by mouth every 4 (four) hours as needed for Pain. 12 tablet 0    predniSONE (DELTASONE) 1 MG tablet Take 1 mg by mouth every other day.      tacrolimus (PROGRAF) 1 MG Cap Take 7 capsules (7 mg total) by mouth every 12 (twelve) hours. 420 capsule 11    triamcinolone acetonide 0.1% (KENALOG) 0.1 % ointment AAA on arms, legs, and neck bid x 1-2 wks then prn flares only (Patient taking differently: Apply to affected area(s) on arms, legs, and neck twice daily x 1-2 wks then as needed for flares only) 80 g 3     No current facility-administered medications on file prior to visit.         Review of patient's allergies indicates:   Allergen Reactions    Chloral hydrate      Other reaction(s): Hallucinations  Other reaction(s): Hives    Hydrocodone Other (See Comments)     Mental status changes       Medications Reconciliation:   I have reconciled the patient's home medications and discharge medications with the patient/family. I have updated all changes.  Refer to After-Visit Medication List.    OBJECTIVE:     Vital Signs:  There were no vitals filed for this visit.  Wt Readings from Last 1 Encounters:   04/16/18 0441 51.1 kg (112 lb 10.5 oz)   04/15/18 0354 53.4 kg  (117 lb 11.6 oz)   04/15/18 0300 53.4 kg (117 lb 11.6 oz)     There is no height or weight on file to calculate BMI.     Wt Readings from Last 3 Encounters:   04/18/18 53.6 kg (118 lb 2.7 oz)   04/16/18 51.1 kg (112 lb 10.5 oz)   04/14/18 55.6 kg (122 lb 9.6 oz)     Temp Readings from Last 3 Encounters:   04/16/18 98.1 °F (36.7 °C) (Oral)   04/14/18 98.3 °F (36.8 °C) (Oral)   04/12/18 98.7 °F (37.1 °C) (Oral)     BP Readings from Last 3 Encounters:   04/18/18 (!) 150/116   04/16/18 (!) 158/98   04/14/18 (!) 195/131     Pulse Readings from Last 3 Encounters:   04/18/18 69   04/16/18 83   04/14/18 105     Repeat BP (manual): 160/100  Repeat BP (post Catapres 0.1 po): 158/98        Physical Exam:  General: Well developed, well nourished. No distress.  HEENT: Head is normocephalic, atraumatic; ears are normal.   Eyes: Clear conjunctiva.  Neck: Supple, symmetrical neck; trachea midline.  Lungs: Clear to auscultation bilaterally and normal respiratory effort.  Cardiovascular: Heart with regular rate and rhythm. No murmurs, gallops or rubs  Extremities: No LE edema. Pulses 2+ and symmetric. Left arm AVF  Abdomen: Abdomen is soft, non-tender non-distended with normal bowel sounds.  Skin: Skin color, texture, turgor normal. No rashes.  Musculoskeletal: Normal gait.   Lymph Nodes: No cervical or supraclavicular adenopathy.  Neurologic: No focal numbness or weakness.   Psychiatric: Not depressed.        Laboratory  Lab Results   Component Value Date    WBC 3.59 (L) 04/15/2018    HGB 7.7 (L) 04/15/2018    HCT 23.2 (L) 04/15/2018    PLT 81 (L) 04/15/2018    CHOL 245 (H) 02/17/2018    TRIG 87 02/17/2018    HDL 58 02/17/2018    ALT 28 04/14/2018    AST 35 04/14/2018     04/16/2018    K 4.5 04/16/2018     04/16/2018    CREATININE 8.6 (H) 04/16/2018    BUN 47 (H) 04/16/2018    CO2 24 04/16/2018    TSH 2.875 12/22/2017    INR 1.1 02/16/2018    HGBA1C 4.5 01/24/2018       ASSESSMENT & PLAN:     HIGH RISK  "CONDITION(S):        Recent admission at [facility] location for Hypertensive Urgency 2/2 non compliance with HD sessions, missed:  *220/148 on admit (asympomatic)   *BP today: 150/116 (??? Medication compliance)  *Repeat BP today (manual provider): 160/100 (asymptomatic)  *Repeat BP (after Catapres): 158/98 (asymptomatic)   contacted her local pharmacy and spoke with "Lien", pharmacist; reports that did not  the Procardia XL since 3/1/2018, picked up the Labetalol 3/28, and the Hydralazine (rec'd via mail order) and the Catapres she is wearing. Have asked the [facility] pharmacy, her preferred, fills the prescription for  by the patient today (30#). Patient is aware of the same.   Had Procardia XL 60 mg tab picked up from our Outpatient pharmacy; she is adamant that she has this at home and taking, so that single dose was not given today in PC; however, Catapres 0.1 mg po dose was given to get pressure controlled; ?? Compliance and have reiterated to her the importance of adherence, with the risk entailed.   ` continue Catapres 0.2 TTS  ` continue Hydralazine (sent to mail order pharmacy at discharge; patient has reportedly received)  ` continue Labetalol   ` continue Procardia XL   - follow up with PC in 1 week for repeat check and instructed to bring all prescription medications to this visit.                                                          ESRD, HD Dependent:   *Reportedly not missed any sessions since most recent discharge.   (Tues, Thurs, Sat)  ` Sensipar        History of Seizure Disorder:   (She is restricted from driving for 6 months)  (Stable/controlled)  ` Vimpat therapy  ` [facility]  - apt with Dr. Mendoza 7/2        Chronic Immunosuppression in the setting of history of Liver Transplantation in 1992:  ` Prednisone qod  ` Prograf (< 1.5 on 4/15); ? If compliant with taking; will defer this to her Transplant team; have spoken with PERRY Francis RN, (Transplant Coordinator); their service is " aware.       Suspect situational Depression:   Will discuss in length with her next week, at follow up clinic visit.   - referral placed to Psych (she is amendable to this)        Needs to follow up with GYN 4/30; vag cxs + for Trichomoniasis, + HPV on pap with ASCUS. Will need to have a Colposcopy.       Instructions for the patient:  1) Check and document your blood pressures and bring log to next clinic appointment.     2) Call with questions.       [facility] Clinic Visit (Post Discharge Follow-up) Today:   - Our clinic physicians and nurses plan to follow the patient up for any medical issues in the [facility] for 30 days post discharge.      Future Appointments  Date Time Provider Department Center   4/18/2018 3:00 PM VASCULAR, LAB [facility] VASCLAB [facility]   4/18/2018 3:45 PM Idalia Diaz MD [facility] VASCSUR [facility]   4/23/2018 9:00 AM LAB, [facility]   4/25/2018 1:00 PM SAM Huerta [facility] IMPRICL [facility]   4/30/2018 11:15 AM Neelam Marroquin MD [facility] [id number]   5/18/2018 10:20 AM Stan Sosa MD [facility] IM [facility]   7/2/2018 1:40 PM Michael Mendoza MD [facility] Cli          Medication List          Accurate as of 4/18/18  2:02 PM. If you have any questions, ask your nurse or doctor.               CHANGE how you take these medications    * NIFEdipine 60 MG (OSM) 24 hr tablet  Commonly known as:  PROCARDIA-XL  Take 1 tablet (60 mg total) by mouth once daily.  What changed:  Another medication with the same name was added. Make sure you understand how and when to take each.     * NIFEdipine 60 MG (OSM) 24 hr tablet  Commonly known as:  PROCARDIA-XL  Take 1 tablet (60 mg total) by mouth once daily.  What changed:  You were already taking a medication with the same name, and this prescription was added. Make sure you understand how and when to take each.  Changed by:  SAM Duncan     triamcinolone acetonide 0.1% 0.1 %  ointment  Commonly known as:  KENALOG  AAA on arms, legs, and neck bid x 1-2 wks then prn flares only  What changed:  additional instructions        * This list has 2 medication(s) that are the same as other medications prescribed for you. Read the directions carefully, and ask your doctor or other care provider to review them with you.            CONTINUE taking these medications    aspirin 81 MG Chew  Take 1 tablet (81 mg total) by mouth once daily.     cinacalcet 30 MG Tab  Commonly known as:  SENSIPAR  Take 1 tablet (30 mg total) by mouth daily with breakfast.     cloNIDine 0.2 mg/24 hr td ptwk 0.2 mg/24 hr  Commonly known as:  CATAPRES  Place 1 patch onto the skin every 7 days. Change every Wednesday     famotidine 20 MG tablet  Commonly known as:  PEPCID  Take 1 tablet (20 mg total) by mouth 2 (two) times daily.     food supplemt, lactose-reduced Liqd  Commonly known as:  ENSURE ACTIVE HIGH PROTEIN  Take 236 mLs by mouth 2 (two) times daily.     hydrALAZINE 50 MG tablet  Commonly known as:  APRESOLINE  Take 2 tablets (100 mg total) by mouth every 8 (eight) hours.     labetalol 200 MG tablet  Commonly known as:  NORMODYNE  Take 2 tablets (400 mg total) by mouth every 8 (eight) hours.     lacosamide 50 mg Tab  Commonly known as:  VIMPAT  Take 1 tablet (50 mg total) by mouth every 12 (twelve) hours.     levETIRAcetam 500 MG Tab  Commonly known as:  KEPPRA  Take 1 tablet (500 mg total) by mouth 2 (two) times daily.     ondansetron 4 MG tablet  Commonly known as:  ZOFRAN  Take 1 tablet (4 mg total) by mouth every 6 (six) hours.     oxyCODONE 5 MG immediate release tablet  Commonly known as:  ROXICODONE  Take 1 tablet (5 mg total) by mouth every 4 (four) hours as needed for Pain.     predniSONE 1 MG tablet  Commonly known as:  DELTASONE     tacrolimus 1 MG Cap  Commonly known as:  PROGRAF  Take 7 capsules (7 mg total) by mouth every 12 (twelve) hours.           Where to Get Your Medications      These medications  were sent to [facility] - [place], [place] - [address]  [address], [place] [address]    Phone:  [phone]   · NIFEdipine 60 MG (OSM) 24 hr tablet         Signing Physician:  SAM Duncan

## 2018-04-19 RX ADMIN — CLONIDINE HYDROCHLORIDE 0.1 MG: 0.1 TABLET ORAL at 02:04

## 2018-04-20 NOTE — PROGRESS NOTES
Patient ID: Holly Patel is a 27 y.o. female.    I. HISTORY     Chief Complaint: Follow-up      HPI Holly Patel is a 27 y.o. female with ESRD on HD via L RC AVF placed July 2015. Returns today for follow up. She has been doing well with no pain, tenderness, motor/sensory changes or any other acute concerns. Dialysis center has accessed the fistula without issue.    Last seen in April 2016. She went to the ED a few weeks ago due to drainage from one of her buttonholes. Has not happened before or since that single episode. No fevere or chills. No arm pain or swelling. No problems with HD, bleeding after needle access or elevated venous pressures.    Review of Systems   Constitution: Negative for chills, fever and weakness.   Eyes: Negative for blurred vision and visual disturbance.   Cardiovascular: Negative for chest pain and dyspnea on exertion.   Respiratory: Negative for shortness of breath.    Endocrine: Negative.    Hematologic/Lymphatic: Negative.    Skin: Negative.    Musculoskeletal: Negative.  Negative for arthritis and myalgias.   Gastrointestinal: Negative for abdominal pain, nausea and vomiting.   Genitourinary: Negative for dysuria.   Neurological: Negative for focal weakness, numbness and paresthesias.       II. PHYSICAL EXAM     There were no vitals filed for this visit.      Physical Exam   Constitutional: She is oriented to person, place, and time. She appears well-developed and well-nourished. No distress.   HENT:   Head: Normocephalic and atraumatic.   Eyes: Conjunctivae and EOM are normal.   Neck: Neck supple. No JVD present.   Cardiovascular: Normal rate and intact distal pulses.    Pulses:       Radial pulses are 2+ on the right side, and 2+ on the left side.        Dorsalis pedis pulses are 2+ on the right side, and 2+ on the left side.        Posterior tibial pulses are 2+ on the right side, and 2+ on the left side.   Pulmonary/Chest: Effort normal. No respiratory  distress.   Abdominal: Soft. She exhibits no distension. There is no tenderness.   Musculoskeletal: Normal range of motion. She exhibits no edema.   Neurological: She is alert and oriented to person, place, and time.   Skin: Skin is warm and dry.   Psychiatric: She has a normal mood and affect.   Vitals reviewed.  Excellent thrill in right RC AVF  No evidence of infection    III. ASSESSMENT & PLAN (MEDICAL DECISION MAKING)     1. ESRD (end stage renal disease) on dialysis    2. Liver replaced by transplant    3. Moderate protein-calorie malnutrition        Imaging Results:    Dialysis access duplex: Flow volume 1617 mL/min, no stenosis      Assessment/Diagnosis and Plan:  Holly Patel is a 27 y.o. female with ESRD. HD via left RC AVF. Chronic narrowing of proximal AVF near anast but is not flow limiting.  Follow up as needed    Idalia Diaz MD FACS [facility]  Vascular & Endovascular Surgery

## 2018-04-23 ENCOUNTER — NURSE TRIAGE (OUTPATIENT)
Dept: ADMINISTRATIVE | Facility: CLINIC | Age: 28
End: 2018-04-23

## 2018-04-23 ENCOUNTER — TELEPHONE (OUTPATIENT)
Dept: INTERNAL MEDICINE | Facility: CLINIC | Age: 28
End: 2018-04-23

## 2018-04-23 ENCOUNTER — HOSPITAL ENCOUNTER (EMERGENCY)
Facility: HOSPITAL | Age: 28
Discharge: HOME | End: 2018-04-24
Attending: EMERGENCY MEDICINE
Payer: MEDICARE

## 2018-04-23 DIAGNOSIS — R05.9 COUGH: ICD-10-CM

## 2018-04-23 DIAGNOSIS — I15.0 RENOVASCULAR HYPERTENSION: Primary | Chronic | ICD-10-CM

## 2018-04-23 PROCEDURE — 99283 EMERGENCY DEPT VISIT LOW MDM: CPT

## 2018-04-23 NOTE — TELEPHONE ENCOUNTER
Reason for Disposition   BP > 160 / 100 and any cardiac or neurologic symptoms (e.g., chest pain, difficulty breathing, unsteady gait, blurred vision)    Protocols used: ST HIGH BLOOD PRESSURE-A-OH    Spoke to Jessica with [facility]. She states Ms. Patel blood pressure was 200/120. Patient is experiencing a headache that is 7-8/10. She has vomited x 3 today. Ca Jessica states she has spoken with transplant and was advised to contact patient's PCP. Advised caller that patient should go to the ED. Jessica will give instruction to patient.     Patient s/p liver txp 1/ 1992, BPA 16 used.

## 2018-04-23 NOTE — TELEPHONE ENCOUNTER
----- Message from Amber Mclaughlin sent at 4/23/2018 11:40 AM CDT -----  Contact: Cindy [facility]  Blood pressure 200/120 symptomatic headache. Transfer the call to [facility]

## 2018-04-24 ENCOUNTER — TELEPHONE (OUTPATIENT)
Dept: TRANSPLANT | Facility: CLINIC | Age: 28
End: 2018-04-24

## 2018-04-24 VITALS
BODY MASS INDEX: 19.33 KG/M2 | TEMPERATURE: 98 F | HEART RATE: 84 BPM | WEIGHT: 116 LBS | DIASTOLIC BLOOD PRESSURE: 115 MMHG | RESPIRATION RATE: 16 BRPM | HEIGHT: 65 IN | OXYGEN SATURATION: 95 % | SYSTOLIC BLOOD PRESSURE: 182 MMHG

## 2018-04-24 PROCEDURE — 25000003 PHARM REV CODE 250: Performed by: EMERGENCY MEDICINE

## 2018-04-24 RX ORDER — HYDRALAZINE HYDROCHLORIDE 25 MG/1
100 TABLET, FILM COATED ORAL
Status: COMPLETED | OUTPATIENT
Start: 2018-04-24 | End: 2018-04-24

## 2018-04-24 RX ORDER — LABETALOL 100 MG/1
200 TABLET, FILM COATED ORAL
Status: COMPLETED | OUTPATIENT
Start: 2018-04-24 | End: 2018-04-24

## 2018-04-24 RX ORDER — DIPHENHYDRAMINE HCL 25 MG
25 CAPSULE ORAL
Status: COMPLETED | OUTPATIENT
Start: 2018-04-24 | End: 2018-04-24

## 2018-04-24 RX ORDER — OXYCODONE HYDROCHLORIDE 5 MG/1
5 TABLET ORAL
Status: COMPLETED | OUTPATIENT
Start: 2018-04-24 | End: 2018-04-24

## 2018-04-24 RX ADMIN — LABETALOL HYDROCHLORIDE 200 MG: 100 TABLET, FILM COATED ORAL at 12:04

## 2018-04-24 RX ADMIN — DIPHENHYDRAMINE HYDROCHLORIDE 25 MG: 25 CAPSULE ORAL at 12:04

## 2018-04-24 RX ADMIN — OXYCODONE HYDROCHLORIDE 5 MG: 5 TABLET ORAL at 12:04

## 2018-04-24 RX ADMIN — HYDRALAZINE HYDROCHLORIDE 100 MG: 25 TABLET ORAL at 12:04

## 2018-04-24 NOTE — DISCHARGE INSTRUCTIONS
It is very important that you attend dialysis as scheduled.  CT taking her blood pressure medication as you have been prescribed.  Use your medication for pain and nausea as needed.  Use cough medication as needed for cough.  Return to emergency room for fever, breathing difficulty, chest pain, uncontrollable nausea or vomiting or any new or worsening symptoms.  Make an appointment to see your primary physician as soon as possible to monitor your symptoms.

## 2018-04-24 NOTE — ED PROVIDER NOTES
Encounter Date: 4/23/2018    SCRIBE #1 NOTE: I, Albaro South, am scribing for, and in the presence of,  Linda Sneed MD. I have scribed the following portions of the note - Other sections scribed: HPI, ROS, PE.       History     Chief Complaint   Patient presents with    General Illness     Pt reports vomiting, HA, & body aches onset this morning. Pt reports dialyzed this past Saturday.      CC: General Illness    HPI: This 27 y.o. Female with anemia in ESRD, ESRD on hemodialysis, secondary hyperparathyroidism and thrombocytopenia presents to the ED c/o cough with yellow phlegm and emesis this morning when she woke up at 5am. Pt reports going to dialysis x2 days ago and having blood work done today. Her most recent menstrual cycle was last month and on time. Pt has not taken any meds for her symptoms. She denies fever, SOB, chest pain, dysuria or odor in urine.    Nephrologist: Dr. Viktor Bass      The history is provided by the patient. No  was used.     Review of patient's allergies indicates:   Allergen Reactions    Chloral hydrate      Other reaction(s): Hallucinations  Other reaction(s): Hives    Hydrocodone Other (See Comments)     Mental status changes     Past Medical History:   Diagnosis Date    Anemia in ESRD (end-stage renal disease) 10/12/2015    Chronic rejection of liver transplant 3/22/2016    Encounter for blood transfusion     ESRD on hemodialysis 9/30/2015    History of splenomegaly 4/12/2016    Immunosuppressed 8/5/2017    Iron deficiency anemia secondary to inadequate dietary iron intake 8/16/2017    She receives IV iron periodically at the [facility].   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)    Moderate protein-calorie malnutrition 8/16/2017    MRSA bacteremia 8/6/2017    Prophylactic immunotherapy 8/4/2014    Renovascular hypertension 10/2/2015    Secondary hyperparathyroidism 8/5/2017    Sialadenitis  3/21/2018    Thrombocytopenia 2016    Thrombocytopenia 2016     Past Surgical History:   Procedure Laterality Date     SECTION      x 2    LIVER BIOPSY      LIVER TRANSPLANT  1992    TUBAL LIGATION  2010     Family History   Problem Relation Age of Onset    Hypertension Mother     Hypertension Father     Melanoma Neg Hx     Breast cancer Neg Hx     Colon cancer Neg Hx     Ovarian cancer Neg Hx      Social History   Substance Use Topics    Smoking status: Never Smoker    Smokeless tobacco: Never Used    Alcohol use No     Review of Systems   Constitutional: Negative for chills and fever.   HENT: Negative for ear pain, rhinorrhea and sore throat.    Eyes: Negative for redness.   Respiratory: Positive for cough. Negative for shortness of breath.    Cardiovascular: Negative for chest pain.   Gastrointestinal: Positive for vomiting (post tussive). Negative for abdominal pain, diarrhea and nausea.   Genitourinary: Negative for dysuria and hematuria.   Musculoskeletal: Negative for back pain and neck pain.   Skin: Negative for rash.   Neurological: Positive for headaches. Negative for weakness and numbness.   Hematological: Does not bruise/bleed easily.   Psychiatric/Behavioral: The patient is not nervous/anxious.        Physical Exam     Initial Vitals [18 2101]   BP Pulse Resp Temp SpO2   (!) 216/131 89 16 98.4 °F (36.9 °C) 97 %      MAP       159.33         Physical Exam    Nursing note and vitals reviewed.  Constitutional: She appears well-developed and well-nourished. She is not diaphoretic. She is cooperative.  Non-toxic appearance. No distress.   HENT:   Head: Normocephalic and atraumatic.   Mouth/Throat: Oropharynx is clear and moist.   Eyes: Conjunctivae and EOM are normal. Pupils are equal, round, and reactive to light. No scleral icterus.   Neck: Normal range of motion and full passive range of motion without pain. Neck supple. No thyromegaly present. No JVD present.    Cardiovascular: Normal rate, regular rhythm and normal pulses.   Murmur heard.  Pulmonary/Chest: Effort normal. No respiratory distress. She has no wheezes.   Pt has mild coarse breath sounds to the L base.   Abdominal: Soft. Normal appearance and bowel sounds are normal. She exhibits no distension and no mass. There is no tenderness. There is no rebound and no guarding.   Musculoskeletal: Normal range of motion. She exhibits no edema or tenderness.   Neurological: She is alert and oriented to person, place, and time. She has normal strength. No cranial nerve deficit or sensory deficit.   Skin: Skin is warm, dry and intact. No rash noted.   Psychiatric: She has a normal mood and affect. Her speech is normal and behavior is normal. Judgment and thought content normal.         ED Course   Procedures  Labs Reviewed - No data to display       X-Rays:   Independently Interpreted Readings:   Chest X-Ray: No infiltrates.     Medical Decision Making:   History:   Old Medical Records: I decided to obtain old medical records.  Differential Diagnosis:   URI  Pneumonia  Bronchitis  CHF exacerbation  Asthma exacerbation  Occult infection  Independently Interpreted Test(s):   I have ordered and independently interpreted X-rays - see prior notes.  Clinical Tests:   Lab Tests: Reviewed  Radiological Study: Ordered and Reviewed  ED Management:  Patient afebrile in no acute distress.  Patient was be hypertensive reports not taking any of her blood pressure medications since this morning although she is supposed take them 3 times a day.  Chest x-ray not indicative of infection.  Patient declines having blood work done because she reports having blood work done this morning.  Labs this morning reviewed and are consistent with her baseline.  Patient does not have hyperkalemia.  She is not hypoxic or tachycardic.  Patient given analgesia as well as doses of her blood pressure medication with improvement in her BP.  She is  hemodynamically stable in the emergency room and has had no episodes of emesis.  Her abdomen is benign.  I have low clinical suspicion of sepsis in this patient.  I have low clinical suspicion of pancreatitis or significant intra-abdominal pathology in this patient.  She is stable for discharge to have dialysis as scheduled this morning and follow-up with her primary physician.  Patient reports feeling better and well enough for discharge home.  Patient and mother Counseled on supportive care and appropriate medication usage. Dicussed extensively concerning symptoms for which to return to ER and the importance of follow up . Patient and mother expressed understanding and agreement with treatment plan.             Scribe Attestation:   Scribe #1: I performed the above scribed service and the documentation accurately describes the services I performed. I attest to the accuracy of the note.    Attending Attestation:           Physician Attestation for Scribe:  Physician Attestation Statement for Scribe #1: I, Linda Sneed MD, reviewed documentation, as scribed by Albaro South in my presence, and it is both accurate and complete.                    Clinical Impression:   The primary encounter diagnosis was Renovascular hypertension. A diagnosis of Cough was also pertinent to this visit.    Disposition:   Disposition: Discharged  Condition: Stable                        Linda Sneed MD  04/24/18 0400

## 2018-04-24 NOTE — LETTER
April 24, 2018    Holly Patel  [address] [place] [id number]          Dear Holly Patel:  MRN: [id number]    Your liver lab results were stable.  Your Prograf (tacrolimus) level remains low/undetected. Compliance with your medications is very important. You should be taking them at the same time every day. If you need assistance with this, please call me or our Social Workers.    Please have your labs drawn again on 5/21/18.      If you cannot have your labs drawn on the scheduled date, it is your responsibility to call the transplant department to reschedule.  To reschedule or make an appointment, please as to speak to or leave a message for my assistant, Jaki Chavis, at [phone].  When leaving a message for Palmira Pollack, or myself, we ask that you leave a brief message regarding your request.    Sincerely,    Violeta Francis, RN, BSN  Liver Transplant Coordinator  [facility]  [address] [place] [id number]  [phone]

## 2018-04-24 NOTE — TELEPHONE ENCOUNTER
Letter sent, liver labs stable and no medication changes are needed - pt with continued undetected prograf on lab. Repeat labs due 5/21/18 per Dr. Pinedo.

## 2018-04-25 ENCOUNTER — OFFICE VISIT (OUTPATIENT)
Dept: PRIMARY CARE CLINIC | Facility: CLINIC | Age: 28
End: 2018-04-25
Payer: MEDICARE

## 2018-04-25 VITALS
BODY MASS INDEX: 18.81 KG/M2 | HEIGHT: 65 IN | HEART RATE: 82 BPM | SYSTOLIC BLOOD PRESSURE: 140 MMHG | DIASTOLIC BLOOD PRESSURE: 86 MMHG | OXYGEN SATURATION: 99 % | WEIGHT: 112.88 LBS

## 2018-04-25 DIAGNOSIS — Z99.2 ESRD (END STAGE RENAL DISEASE) ON DIALYSIS: ICD-10-CM

## 2018-04-25 DIAGNOSIS — N18.6 ESRD (END STAGE RENAL DISEASE) ON DIALYSIS: ICD-10-CM

## 2018-04-25 DIAGNOSIS — Z94.4 LIVER REPLACED BY TRANSPLANT: Chronic | ICD-10-CM

## 2018-04-25 DIAGNOSIS — F43.21 SITUATIONAL DEPRESSION: ICD-10-CM

## 2018-04-25 DIAGNOSIS — I15.0 RENOVASCULAR HYPERTENSION: Primary | Chronic | ICD-10-CM

## 2018-04-25 DIAGNOSIS — Z29.89 PROPHYLACTIC IMMUNOTHERAPY: ICD-10-CM

## 2018-04-25 DIAGNOSIS — R56.9 SEIZURES: ICD-10-CM

## 2018-04-25 PROCEDURE — 99213 OFFICE O/P EST LOW 20 MIN: CPT | Mod: S$PBB,,, | Performed by: NURSE PRACTITIONER

## 2018-04-25 PROCEDURE — 99999 PR PBB SHADOW E&M-EST. PATIENT-LVL IV: CPT | Mod: PBBFAC,,, | Performed by: NURSE PRACTITIONER

## 2018-04-25 PROCEDURE — 99214 OFFICE O/P EST MOD 30 MIN: CPT | Mod: PBBFAC | Performed by: NURSE PRACTITIONER

## 2018-04-25 RX ORDER — ONDANSETRON 4 MG/1
4 TABLET, FILM COATED ORAL EVERY 6 HOURS
Qty: 20 TABLET | Refills: 0 | Status: ON HOLD | OUTPATIENT
Start: 2018-04-25 | End: 2018-05-30 | Stop reason: HOSPADM

## 2018-04-25 RX ORDER — CITALOPRAM 20 MG/1
20 TABLET, FILM COATED ORAL DAILY
Qty: 30 TABLET | Refills: 1 | Status: SHIPPED | OUTPATIENT
Start: 2018-04-25 | End: 2018-06-26 | Stop reason: SDUPTHER

## 2018-04-25 NOTE — Clinical Note
[facility] Clinic Visit (Post Discharge Follow-up) Today:  - Our clinic physicians and nurses plan to follow the patient up for any medical issues in the [facility] for 30 days post discharge.  Future Appointments: 4/30/2018  11:15 AM   Neelam Marroquin MD    [facility] [id number]   [facility]  5/18/2018  10:20 AM   Stan Sosa MD         [facility]  7/2/2018   1:40 PM    Michael Mendoza MD       [facility]

## 2018-04-25 NOTE — PROGRESS NOTES
[facility]  Follow-up Visit Progress Note     PRESENTING HISTORY     PCP: Stan Sosa MD  Chief Complaint/Reason for Visit:  Follow up from recent visit.      Chief Complaint   Patient presents with    Hospital Follow Up       History of Present Illness & ROS: Ms. Holly Patel is a 27 y.o. female.    Prior clinic visit:   Holly presents to  today for hospital follow up. She was driven to clinic by her nephew. Does not endorse fever, chills, coughing, headaches, dizziness, chest pain or other discomforts.       Today:   Holly presented to the ED on 4/23 with uncontrolled BPs again, likely due to non compliance. Refused labs in the ED. BP control gained and discharged home. Requested that she brings all her prescription meds to today's clinic visit, did not bring any medications.   She continues to express occassional bouts of nausea, no abd pain, no diarrhea, no early satiety and no change in appetite, but progressive decline of weight.       Review of Systems:  Eyes: denies visual changes at this time denies floaters   ENT: no nasal congestion or sore throat  Respiratory: no cough or shorness of breath  Cardiovascular: no chest pain or palpitations  Gastrointestinal: no nausea or vomiting, no abdominal pain or change in bowel habits  Genitourinary: no hematuria or dysuria; denies frequency  Hematologic/Lymphatic: no easy bruising or lymphadenopathy  Musculoskeletal: no arthralgias or myalgias  Neurological: no seizures or tremors  Endocrine: no heat or cold intolerance      PAST HISTORY:     Past Medical History:   Diagnosis Date    Anemia in ESRD (end-stage renal disease) 10/12/2015    Chronic rejection of liver transplant 3/22/2016    Encounter for blood transfusion     ESRD on hemodialysis 9/30/2015    History of splenomegaly 4/12/2016    Immunosuppressed 8/5/2017    Iron deficiency anemia secondary to inadequate dietary iron intake 8/16/2017    She receives IV iron periodically  at the Dialysis Center.   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 ()    Moderate protein-calorie malnutrition 2017    MRSA bacteremia 2017    Prophylactic immunotherapy 2014    Renovascular hypertension 10/2/2015    Secondary hyperparathyroidism 2017    Sialadenitis 3/21/2018    Thrombocytopenia 2016    Thrombocytopenia 2016       Past Surgical History:   Procedure Laterality Date     SECTION      x 2    LIVER BIOPSY      LIVER TRANSPLANT  1992    TUBAL LIGATION         Family History   Problem Relation Age of Onset    Hypertension Mother     Hypertension Father     Melanoma Neg Hx     Breast cancer Neg Hx     Colon cancer Neg Hx     Ovarian cancer Neg Hx        Social History     Social History    Marital status: Legally      Spouse name: N/A    Number of children: N/A    Years of education: N/A     Social History Main Topics    Smoking status: Never Smoker    Smokeless tobacco: Never Used    Alcohol use No    Drug use: No    Sexual activity: No     Other Topics Concern    Are You Pregnant Or Think You May Be? No    Breast-Feeding No     Social History Narrative    Lives 2 kids, 7 and 8, and nephew. Her mom helps with kids.       MEDICATIONS & ALLERGIES:     Current Outpatient Prescriptions on File Prior to Visit   Medication Sig Dispense Refill    aspirin 81 MG Chew Take 1 tablet (81 mg total) by mouth once daily.  0    cinacalcet (SENSIPAR) 30 MG Tab Take 1 tablet (30 mg total) by mouth daily with breakfast. 30 tablet 11    cloNIDine 0.2 mg/24 hr td ptwk (CATAPRES) 0.2 mg/24 hr Place 1 patch onto the skin every 7 days. Change every Wednesday 4 patch 11    famotidine (PEPCID) 20 MG tablet Take 1 tablet (20 mg total) by mouth 2 (two) times daily. 20 tablet 0    food supplemt, lactose-reduced (ENSURE ACTIVE HIGH PROTEIN) Liqd Take 236 mLs by mouth 2 (two) times daily. 60 Can 6    hydrALAZINE  (APRESOLINE) 50 MG tablet Take 2 tablets (100 mg total) by mouth every 8 (eight) hours. 90 tablet 5    labetalol (NORMODYNE) 200 MG tablet Take 2 tablets (400 mg total) by mouth every 8 (eight) hours. 360 tablet 11    lacosamide (VIMPAT) 50 mg Tab Take 1 tablet (50 mg total) by mouth every 12 (twelve) hours. (Patient taking differently: Take 50 mg by mouth every 12 (twelve) hours. ) 60 tablet 0    levETIRAcetam (KEPPRA) 500 MG Tab Take 1 tablet (500 mg total) by mouth 2 (two) times daily. 60 tablet 0    NIFEdipine (PROCARDIA-XL) 60 MG (OSM) 24 hr tablet Take 1 tablet (60 mg total) by mouth once daily. 1 tablet 0    ondansetron (ZOFRAN) 4 MG tablet Take 1 tablet (4 mg total) by mouth every 6 (six) hours. 12 tablet 0    oxyCODONE (ROXICODONE) 5 MG immediate release tablet Take 1 tablet (5 mg total) by mouth every 4 (four) hours as needed for Pain. 12 tablet 0    predniSONE (DELTASONE) 1 MG tablet Take 1 mg by mouth every other day.      tacrolimus (PROGRAF) 1 MG Cap Take 7 capsules (7 mg total) by mouth every 12 (twelve) hours. 420 capsule 11    triamcinolone acetonide 0.1% (KENALOG) 0.1 % ointment AAA on arms, legs, and neck bid x 1-2 wks then prn flares only (Patient taking differently: Apply to affected area(s) on arms, legs, and neck twice daily x 1-2 wks then as needed for flares only) 80 g 3     No current facility-administered medications on file prior to visit.         Review of patient's allergies indicates:   Allergen Reactions    Chloral hydrate      Other reaction(s): Hallucinations  Other reaction(s): Hives    Hydrocodone Other (See Comments)     Mental status changes       Medications Reconciliation:   I have reconciled the patient's home medications and discharge medications with the patient/family. I have updated all changes.  Refer to After-Visit Medication List.    OBJECTIVE:     Vital Signs:  There were no vitals filed for this visit.  Wt Readings from Last 1 Encounters:   04/23/18 6461  52.6 kg (116 lb)     There is no height or weight on file to calculate BMI.       Wt Readings from Last 3 Encounters:   04/25/18 51.2 kg (112 lb 14 oz)   04/23/18 52.6 kg (116 lb)   04/18/18 53.6 kg (118 lb 2.7 oz)     Temp Readings from Last 3 Encounters:   04/24/18 98.4 °F (36.9 °C) (Oral)   04/16/18 98.1 °F (36.7 °C) (Oral)   04/14/18 98.3 °F (36.8 °C) (Oral)     BP Readings from Last 3 Encounters:   04/25/18 (!) 140/86   04/24/18 (!) 182/115   04/18/18 (!) 150/116     Pulse Readings from Last 3 Encounters:   04/25/18 82   04/24/18 84   04/18/18 69       Physical Exam:  General: Emaciated appearing. No distress.  HEENT: Head is normocephalic, atraumatic; ears are normal.   Eyes: Clear conjunctiva.  Neck: Supple, symmetrical neck; trachea midline.  Lungs: Clear to auscultation bilaterally and normal respiratory effort.  Cardiovascular: Heart with regular rate and rhythm. No murmurs, gallops or rubs  Extremities: No LE edema. Pulses 2+ and symmetric. Left arm AVF  Abdomen: Abdomen is soft, non-tender non-distended with normal bowel sounds.  Skin: Skin color, texture, turgor normal. No rashes.  Musculoskeletal: Normal gait.   Lymph Nodes: No cervical or supraclavicular adenopathy.  Neurologic: No focal numbness or weakness.   Psychiatric: Not depressed    Laboratory  Lab Results   Component Value Date    WBC 2.70 (L) 04/23/2018    HGB 7.0 (L) 04/23/2018    HCT 21.6 (L) 04/23/2018    PLT 79 (L) 04/23/2018    CHOL 245 (H) 02/17/2018    TRIG 87 02/17/2018    HDL 58 02/17/2018    ALT 38 04/23/2018    AST 48 (H) 04/23/2018     04/23/2018    K 4.3 04/23/2018    CL 97 04/23/2018    CREATININE 7.8 (H) 04/23/2018    BUN 34 (H) 04/23/2018    CO2 34 (H) 04/23/2018    TSH 2.875 12/22/2017    INR 1.1 02/16/2018    HGBA1C 4.5 01/24/2018         ASSESSMENT & PLAN:     HIGH RISK CONDITION(S):      Here for 2nd follow up; however, since last seen, she returned to the ED with uncontrolled BPs again, due to non compliance:    Recent admission at [facility]  location for Hypertensive Urgency 2/2 non compliance with HD sessions, missed:  *BP today with reflection of control: 140/86 (took ALL prescribed meds; reported; requested that all meds be brought to clinic apt today; none brought to provider)  ` continue Catapres 0.2 TTS  ` continue Pofyjzomjtd468/100/100 (sent to mail order pharmacy at discharge; patient has reportedly received)  ` continue Labetalol 400/400/400  ` continue Procardia XL 60                                                          ESRD, HD Dependent:   *Reportedly not missed any sessions since most recent discharge.   (Tues, Thurs, Sat)  ` Sensipar           History of Seizure Disorder:   (She is restricted from driving for 6 months)  (Stable/controlled)  ` Vimpat therapy  ` [facility]undo  - apt with Dr. Mendoza 7/2           Chronic Immunosuppression in the setting of history of Liver Transplantation in 1992:  ` Prednisone qod  ` Prograf (< 1.5 on 4/15); ? If compliant with taking; will defer this to her Transplant team; have spoken with PERRY Francis RN, (Transplant Coordinator); their service is aware.         Unintentional Weight Loss:   ? Vol (ESRD patient)  ? Muscle Mass  Has had a 6 # wt loss, in the past 7 days, but has been steadily losing weight over the past several months. (Est. ~ 15 # since Jan)  Noted that she has had workup for chronic nausea in Jan of this year, with no findings yielded. She is able to eat, but unable to fully absorb. Denies alcohol or recreational drug use.  - consider referral to GI for further eval and scope  - would like to have obtained HIV screening, Amylase and Lipase, etc, today, but refused labs (noted the CBC and CMP per Dr. Pinedo on 4/23); of note, Prograf dose adjusted, per Hep, given level < 2.0      Situational Depression:  - start Celexa 20 daily (may need uptitration and can be done over the next 1-2 weeks, if needed; patient has been informed that can take 4-6 weeks to  develop therapeutic effectiveness with these agents)  - referral placed to Psych (she is amendable to this); provided with the direct # to the their clinic.         Needs to follow up with GYN 4/30; vag cxs + for Trichomoniasis, + HPV on pap with ASCUS. Will need to have a Colposcopy.         *This note has been electronically shared with her PCP.        [facility] Clinic Visit (Post Discharge Follow-up) Today:   - Our clinic physicians and nurses plan to follow the patient up for any medical issues in the [facility] for 30 days post discharge.    Future Appointments  Date Time Provider Department Center   4/30/2018 11:15 AM Neelam Marroquin MD [facility] [id number]   5/18/2018 10:20 AM Stan Sosa MD [facility]   7/2/2018 1:40 PM [facility] MD [facility]          Medication List          Accurate as of 4/25/18  2:29 PM. If you have any questions, ask your nurse or doctor.               START taking these medications    citalopram 20 MG tablet  Commonly known as:  CELEXA  Take 1 tablet (20 mg total) by mouth once daily.        CHANGE how you take these medications    triamcinolone acetonide 0.1% 0.1 % ointment  Commonly known as:  KENALOG  AAA on arms, legs, and neck bid x 1-2 wks then prn flares only  What changed:  additional instructions        CONTINUE taking these medications    aspirin 81 MG Chew  Take 1 tablet (81 mg total) by mouth once daily.     cinacalcet 30 MG Tab  Commonly known as:  SENSIPAR  Take 1 tablet (30 mg total) by mouth daily with breakfast.     cloNIDine 0.2 mg/24 hr td ptwk 0.2 mg/24 hr  Commonly known as:  CATAPRES  Place 1 patch onto the skin every 7 days. Change every Wednesday     famotidine 20 MG tablet  Commonly known as:  PEPCID  Take 1 tablet (20 mg total) by mouth 2 (two) times daily.     food supplemt, lactose-reduced Liqd  Commonly known as:  ENSURE ACTIVE HIGH PROTEIN  Take 236 mLs by mouth 2 (two) times daily.     hydrALAZINE 50 MG  tablet  Commonly known as:  APRESOLINE  Take 2 tablets (100 mg total) by mouth every 8 (eight) hours.     labetalol 200 MG tablet  Commonly known as:  NORMODYNE  Take 2 tablets (400 mg total) by mouth every 8 (eight) hours.     lacosamide 50 mg Tab  Commonly known as:  VIMPAT  Take 1 tablet (50 mg total) by mouth every 12 (twelve) hours.     levETIRAcetam 500 MG Tab  Commonly known as:  KEPPRA  Take 1 tablet (500 mg total) by mouth 2 (two) times daily.     NIFEdipine 60 MG (OSM) 24 hr tablet  Commonly known as:  PROCARDIA-XL  Take 1 tablet (60 mg total) by mouth once daily.     ondansetron 4 MG tablet  Commonly known as:  ZOFRAN  Take 1 tablet (4 mg total) by mouth every 6 (six) hours.     oxyCODONE 5 MG immediate release tablet  Commonly known as:  ROXICODONE  Take 1 tablet (5 mg total) by mouth every 4 (four) hours as needed for Pain.     predniSONE 1 MG tablet  Commonly known as:  DELTASONE     tacrolimus 1 MG Cap  Commonly known as:  PROGRAF  Take 7 capsules (7 mg total) by mouth every 12 (twelve) hours.           Where to Get Your Medications      These medications were sent to [facility]/pharmacy #[id number] - [place] - [address] [address]    Phone:  [phone]   · citalopram 20 MG tablet  · ondansetron 4 MG tablet       Signing Physician:  SAM Duncan

## 2018-04-30 ENCOUNTER — PROCEDURE VISIT (OUTPATIENT)
Dept: OBSTETRICS AND GYNECOLOGY | Facility: CLINIC | Age: 28
End: 2018-04-30
Attending: OBSTETRICS & GYNECOLOGY
Payer: MEDICARE

## 2018-04-30 VITALS
SYSTOLIC BLOOD PRESSURE: 190 MMHG | HEIGHT: 65 IN | BODY MASS INDEX: 19.24 KG/M2 | WEIGHT: 115.5 LBS | DIASTOLIC BLOOD PRESSURE: 130 MMHG

## 2018-04-30 DIAGNOSIS — R87.610 ASCUS WITH POSITIVE HIGH RISK HPV CERVICAL: Primary | ICD-10-CM

## 2018-04-30 DIAGNOSIS — R87.810 ASCUS WITH POSITIVE HIGH RISK HPV CERVICAL: Primary | ICD-10-CM

## 2018-04-30 LAB
B-HCG UR QL: NEGATIVE
CTP QC/QA: YES

## 2018-04-30 PROCEDURE — 57454 BX/CURETT OF CERVIX W/SCOPE: CPT | Mod: PBBFAC | Performed by: OBSTETRICS & GYNECOLOGY

## 2018-04-30 PROCEDURE — 88305 TISSUE EXAM BY PATHOLOGIST: CPT | Mod: 26,,, | Performed by: PATHOLOGY

## 2018-04-30 PROCEDURE — 81025 URINE PREGNANCY TEST: CPT | Mod: PBBFAC | Performed by: OBSTETRICS & GYNECOLOGY

## 2018-04-30 PROCEDURE — 88305 TISSUE EXAM BY PATHOLOGIST: CPT | Performed by: PATHOLOGY

## 2018-04-30 NOTE — PROCEDURES
Colposcopy  Date/Time: 4/30/2018 11:39 AM  Performed by: BENIGNO HERNANDEZ  Authorized by: BENIGNO HERNANDEZ     Consent Done?:  Yes (Written)  Assistants?: No      Colposcopy Site:  Cervix  Position:  Supine   Patient was prepped and draped in the normal sterile fashion.  Acrowhite Lesion? Yes    Atypical Vessels: No    Transformation Zone Adequate?: Yes    Biopsy?: Yes         Location:  Cervix ((12 00))  ECC Performed?: Yes    LEEP Performed?: No    Estimated blood loss (cc):  1   Patient tolerated the procedure well with no immediate complications.   Post-operative instructions were provided for the patient.   Patient was discharged and will follow up if any complications occur     COLPOSCOPY:    Holly Patel is a 27 y.o. female with Patient's last menstrual period was 03/01/2018 (approximate). who presents for a colposcopy secondary to abnormal pap smear.    UPT is negative.    INDICATIONS: Her most recent papsmear showed: ASCUS with POSITIVE high risk HPV    She does not have a history of abnormal pap smears.      PRE-COLPOSCOPY PROCEDURE COUNSELING:  Discussed the abnormal pap test findings, HPV, need for colposcopy and possible biopsies to determine a diagnosis and plan of care, treatments available, the minimal risks of bleeding and infection with a colposcopy, alternatives to colposcopy and she agrees to proceed.  Patient was given the opportunity to ask questions and verbal consent was obtained.        COLPOSCOPY EXAM:   TIME OUT PERFORMED.     acetowhite lesion(s) noted at 12 o'clock    Biopsy was taken at 12 o'clock.  ECC was performed.  Minimal blood loss.        The speculum was removed. The patient tolerated the procedure well.  There were no complications.      IMPRESSION:   Abnormal Pap     POST COLPOSCOPY COUNSELING:   Manage post colposcopy cramping with NSAIDs, Tylenol or Rx per [employer].  Avoid anything in vagina (intercourse, douching, tampons) one week after the  procedure.  Expect a clumpy blackish vaginal discharge (Monsel's solution) for several days.   Report bleeding heavier than a period, worsening pain, fever > 101.0 F, or foul-smelling vaginal discharge.  HPV vaccine recommended according to FDA age guidelines.  Importance of follow-up stressed.    Counseling lasted approximately 15 minutes and all her questions were answered.    FOLLOW-UP:   In 12 months.

## 2018-05-01 ENCOUNTER — HOSPITAL ENCOUNTER (EMERGENCY)
Facility: HOSPITAL | Age: 28
Discharge: HOME | End: 2018-05-01
Attending: EMERGENCY MEDICINE
Payer: MEDICARE

## 2018-05-01 VITALS
HEART RATE: 92 BPM | RESPIRATION RATE: 20 BRPM | OXYGEN SATURATION: 99 % | HEIGHT: 63 IN | BODY MASS INDEX: 20.38 KG/M2 | DIASTOLIC BLOOD PRESSURE: 135 MMHG | WEIGHT: 115 LBS | TEMPERATURE: 98 F | SYSTOLIC BLOOD PRESSURE: 199 MMHG

## 2018-05-01 DIAGNOSIS — E87.70 HYPERVOLEMIA, UNSPECIFIED HYPERVOLEMIA TYPE: Primary | ICD-10-CM

## 2018-05-01 DIAGNOSIS — R05.9 COUGH: ICD-10-CM

## 2018-05-01 DIAGNOSIS — R10.9 ABDOMINAL PAIN: ICD-10-CM

## 2018-05-01 LAB
ALBUMIN SERPL BCP-MCNC: 2.5 G/DL
ALP SERPL-CCNC: 523 U/L
ALT SERPL W/O P-5'-P-CCNC: 32 U/L
ANION GAP SERPL CALC-SCNC: 11 MMOL/L
AST SERPL-CCNC: 31 U/L
BASOPHILS # BLD AUTO: 0.01 K/UL
BASOPHILS NFR BLD: 0.2 %
BILIRUB SERPL-MCNC: 0.6 MG/DL
BNP SERPL-MCNC: 1335 PG/ML
BUN SERPL-MCNC: 48 MG/DL
CALCIUM SERPL-MCNC: 9.3 MG/DL
CHLORIDE SERPL-SCNC: 96 MMOL/L
CO2 SERPL-SCNC: 31 MMOL/L
CREAT SERPL-MCNC: 9.7 MG/DL
DIFFERENTIAL METHOD: ABNORMAL
EOSINOPHIL # BLD AUTO: 0.3 K/UL
EOSINOPHIL NFR BLD: 6.4 %
ERYTHROCYTE [DISTWIDTH] IN BLOOD BY AUTOMATED COUNT: 16.7 %
EST. GFR  (AFRICAN AMERICAN): 6 ML/MIN/1.73 M^2
EST. GFR  (NON AFRICAN AMERICAN): 5 ML/MIN/1.73 M^2
GLUCOSE SERPL-MCNC: 113 MG/DL
HCT VFR BLD AUTO: 19.9 %
HGB BLD-MCNC: 6.8 G/DL
LIPASE SERPL-CCNC: 33 U/L
LYMPHOCYTES # BLD AUTO: 0.5 K/UL
LYMPHOCYTES NFR BLD: 11.7 %
MAGNESIUM SERPL-MCNC: 2.3 MG/DL
MCH RBC QN AUTO: 27.4 PG
MCHC RBC AUTO-ENTMCNC: 34.2 G/DL
MCV RBC AUTO: 80 FL
MONOCYTES # BLD AUTO: 0.3 K/UL
MONOCYTES NFR BLD: 7.6 %
NEUTROPHILS # BLD AUTO: 3.2 K/UL
NEUTROPHILS NFR BLD: 74.1 %
PHOSPHATE SERPL-MCNC: 5.3 MG/DL
PLATELET # BLD AUTO: 62 K/UL
PMV BLD AUTO: 9.3 FL
POTASSIUM SERPL-SCNC: 3.9 MMOL/L
PROT SERPL-MCNC: 7.4 G/DL
RBC # BLD AUTO: 2.48 M/UL
SODIUM SERPL-SCNC: 138 MMOL/L
TROPONIN I SERPL DL<=0.01 NG/ML-MCNC: 0.03 NG/ML
WBC # BLD AUTO: 4.35 K/UL

## 2018-05-01 PROCEDURE — 84484 ASSAY OF TROPONIN QUANT: CPT

## 2018-05-01 PROCEDURE — 83880 ASSAY OF NATRIURETIC PEPTIDE: CPT

## 2018-05-01 PROCEDURE — 85025 COMPLETE CBC W/AUTO DIFF WBC: CPT

## 2018-05-01 PROCEDURE — 84100 ASSAY OF PHOSPHORUS: CPT

## 2018-05-01 PROCEDURE — 93010 ELECTROCARDIOGRAM REPORT: CPT | Mod: ,,, | Performed by: INTERNAL MEDICINE

## 2018-05-01 PROCEDURE — 99285 EMERGENCY DEPT VISIT HI MDM: CPT | Mod: 25

## 2018-05-01 PROCEDURE — 80053 COMPREHEN METABOLIC PANEL: CPT

## 2018-05-01 PROCEDURE — 83735 ASSAY OF MAGNESIUM: CPT

## 2018-05-01 PROCEDURE — 83690 ASSAY OF LIPASE: CPT

## 2018-05-01 PROCEDURE — 25000003 PHARM REV CODE 250: Performed by: EMERGENCY MEDICINE

## 2018-05-01 PROCEDURE — 25000003 PHARM REV CODE 250: Performed by: PHYSICIAN ASSISTANT

## 2018-05-01 PROCEDURE — 93005 ELECTROCARDIOGRAM TRACING: CPT

## 2018-05-01 RX ORDER — HYDRALAZINE HYDROCHLORIDE 25 MG/1
25 TABLET, FILM COATED ORAL
Status: COMPLETED | OUTPATIENT
Start: 2018-05-01 | End: 2018-05-01

## 2018-05-01 RX ORDER — LABETALOL 100 MG/1
400 TABLET, FILM COATED ORAL ONCE
Status: DISCONTINUED | OUTPATIENT
Start: 2018-05-01 | End: 2018-05-01

## 2018-05-01 RX ORDER — CLONIDINE HYDROCHLORIDE 0.1 MG/1
0.2 TABLET ORAL
Status: COMPLETED | OUTPATIENT
Start: 2018-05-01 | End: 2018-05-01

## 2018-05-01 RX ORDER — NIFEDIPINE 30 MG/1
60 TABLET, EXTENDED RELEASE ORAL DAILY
Status: DISCONTINUED | OUTPATIENT
Start: 2018-05-01 | End: 2018-05-01 | Stop reason: HOSPADM

## 2018-05-01 RX ORDER — LABETALOL 100 MG/1
400 TABLET, FILM COATED ORAL
Status: COMPLETED | OUTPATIENT
Start: 2018-05-01 | End: 2018-05-01

## 2018-05-01 RX ORDER — NIFEDIPINE 30 MG/1
60 TABLET, EXTENDED RELEASE ORAL DAILY
Status: DISCONTINUED | OUTPATIENT
Start: 2018-05-01 | End: 2018-05-01 | Stop reason: SDUPTHER

## 2018-05-01 RX ADMIN — CLONIDINE HYDROCHLORIDE 0.2 MG: 0.1 TABLET ORAL at 05:05

## 2018-05-01 RX ADMIN — LABETALOL HYDROCHLORIDE 400 MG: 100 TABLET, FILM COATED ORAL at 05:05

## 2018-05-01 RX ADMIN — HYDRALAZINE HYDROCHLORIDE 25 MG: 25 TABLET ORAL at 05:05

## 2018-05-01 RX ADMIN — NIFEDIPINE 60 MG: 30 TABLET, FILM COATED, EXTENDED RELEASE ORAL at 06:05

## 2018-05-01 NOTE — ED NOTES
Patient resting quietly with eyes closed. Blood pressure increased but is baseline from night which Dr is aware of per EDMOND Jackson. No needs noted. Continue to monitor.

## 2018-05-01 NOTE — ED NOTES
Phoned Dialysis for patient [phone]. Notified nurse to tell patient to come straight over when discharged.

## 2018-05-01 NOTE — DISCHARGE INSTRUCTIONS
"Return to the Emergency Department of any acute worsening of your symptoms or for any other concern.     You should return to the ED for fever/chills, shortness of breath, chest pain, weakness or "passing out".     Pt should take all medications as prescribed.    Pt should follow up with PCP as soon as possible.    The risks associated with not taking your medications as prescribed and not following up with your Primary Care doctor or sub specialist includes worsening of your condition, pain, disability, loss of function or livelihood, and death      STEVE Segal M.D. 8:06 AM 5/1/2018      Our goal in the emergency department is to always give you outstanding care and exceptional service. You may receive a survey by mail or e-mail in the next week regarding your experience in our ED. We would greatly appreciate your completing and returning the survey. Your feedback provides us with a way to recognize our staff who give very good care and it helps us learn how to improve when your experience was below our aspiration of excellence.     "

## 2018-05-01 NOTE — ED TRIAGE NOTES
Pt seen in ED with complaints of right upper quadrant abdominal pain that began last night. Pt stated the pain woke her up out of her sleep. Pt is a dialysis patient, Tuesday, Thursday, and Saturday.

## 2018-05-01 NOTE — ED PROVIDER NOTES
"Encounter Date: 5/1/2018    SCRIBE #1 NOTE: I, Pippa Grover, am scribing for, and in the presence of,  Jax Mckeon PA-C. I have scribed the following portions of the note - Other sections scribed: HPI and ROS.       History     Chief Complaint   Patient presents with    Abdominal Pain     Right upper quad pain since last night, pt due for dialysis today      CC: Abdominal Pain    HPI: The pt is a 27 y.o. F who presents to the ED c/o RUQ abdominal pain since last night. Pt reports that pain woke her up from sleep and has been constant since onset. She rates pain as moderate (5/10) and describes it as "sharp." Pt also c/o a headache and cough. She states that she has not taken any of her home meds including her bp meds (labetalol, hydralazine, clonidine) for today yet. LBM was yesterday and normal. No attempted treatments reported. Pt otherwise denies chest pain, SOB, constipation, dark colored stool, n/v/d, and other associated symptoms.     Pmhx includes ESRD on HD (Tues, Thurs, Sat; due for next one today at 0600) via L [facility] AVF placed on July 2015, liver transplant (9/10/2012) w/ rejection of transplant, anemia, encounter for blood transfusion, renovascular HTN, splenomegaly, MRSA bacteremia, prophylactic immunotherapy, secondary hyperparathyroidism, and thrombocytopenia.      The history is provided by the patient. No  was used.     Review of patient's allergies indicates:   Allergen Reactions    Chloral hydrate      Other reaction(s): Hallucinations  Other reaction(s): Hives    Hydrocodone Other (See Comments)     Mental status changes     Past Medical History:   Diagnosis Date    Anemia in ESRD (end-stage renal disease) 10/12/2015    Chronic rejection of liver transplant 3/22/2016    Encounter for blood transfusion     ESRD on hemodialysis 9/30/2015    History of splenomegaly 4/12/2016    Immunosuppressed 8/5/2017    Iron deficiency anemia secondary to inadequate dietary iron intake " 2017    She [facility].   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 ()    Moderate protein-calorie malnutrition 2017    MRSA bacteremia 2017    Prophylactic immunotherapy 2014    Renovascular hypertension 10/2/2015    Secondary hyperparathyroidism 2017    Sialadenitis 3/21/2018    Thrombocytopenia 2016    Thrombocytopenia 2016     Past Surgical History:   Procedure Laterality Date     SECTION      x 2    LIVER BIOPSY      LIVER TRANSPLANT  1992    TUBAL LIGATION       Family History   Problem Relation Age of Onset    Hypertension Mother     Hypertension Father     Melanoma Neg Hx     Breast cancer Neg Hx     Colon cancer Neg Hx     Ovarian cancer Neg Hx      Social History   Substance Use Topics    Smoking status: Never Smoker    Smokeless tobacco: Never Used    Alcohol use No     Review of Systems   Constitutional: Negative for chills, diaphoresis and fever.   HENT: Negative for ear pain and sore throat.    Eyes: Negative for visual disturbance.   Respiratory: Positive for cough. Negative for shortness of breath.    Cardiovascular: Negative for chest pain.   Gastrointestinal: Positive for abdominal pain (5/10, RUQ). Negative for blood in stool, constipation, diarrhea, nausea and vomiting.        (-) dark colored stool   Genitourinary: Negative for flank pain.   Musculoskeletal: Negative for back pain.   Skin: Negative for rash.   Neurological: Positive for headaches.       Physical Exam     Initial Vitals [18 0502]   BP Pulse Resp Temp SpO2   (!) 220/102 98 20 98 °F (36.7 °C) 96 %      MAP       141.33         Physical Exam    Vitals reviewed.  Constitutional: She appears well-developed and well-nourished. She is not diaphoretic. She appears ill. No distress.   HENT:   Head: Normocephalic and atraumatic.   Right Ear: External ear normal.   Left Ear: External ear  normal.   Nose: Nose normal.   Eyes: Conjunctivae are normal. No scleral icterus.   Neck: Normal range of motion. Neck supple. No JVD present.   Cardiovascular: Normal rate, regular rhythm, normal heart sounds and intact distal pulses.   Pulmonary/Chest: Breath sounds normal. No respiratory distress. She has no wheezes. She has no rhonchi. She has no rales. She exhibits no tenderness.   Abdominal: Soft. Bowel sounds are normal. She exhibits no distension, no ascites and no mass. There is tenderness in the right upper quadrant. There is no rigidity, no rebound, no guarding, no CVA tenderness, no tenderness at McBurney's point and negative Garcia's sign.   Musculoskeletal: Normal range of motion. She exhibits no edema.   Neurological: She is alert and oriented to person, place, and time.   Skin: Skin is warm and dry.         ED Course   Procedures  Labs Reviewed   CBC W/ AUTO DIFFERENTIAL - Abnormal; Notable for the following:        Result Value    RBC 2.48 (*)     Hemoglobin 6.8 (*)     Hematocrit 19.9 (*)     MCV 80 (*)     RDW 16.7 (*)     Platelets 62 (*)     Lymph # 0.5 (*)     Gran% 74.1 (*)     Lymph% 11.7 (*)     All other components within normal limits    Narrative:     HCT   critical result(s) called and verbal readback obtained from   DARLENE GARCIA @ 7:10AM, 05/01/2018 07:12   COMPREHENSIVE METABOLIC PANEL - Abnormal; Notable for the following:     CO2 31 (*)     Glucose 113 (*)     BUN, Bld 48 (*)     Creatinine 9.7 (*)     Albumin 2.5 (*)     Alkaline Phosphatase 523 (*)     eGFR if  6 (*)     eGFR if non  5 (*)     All other components within normal limits   B-TYPE NATRIURETIC PEPTIDE - Abnormal; Notable for the following:     BNP 1,335 (*)     All other components within normal limits   TROPONIN I - Abnormal; Notable for the following:     Troponin I 0.029 (*)     All other components within normal limits   PHOSPHORUS - Abnormal; Notable for the following:      Phosphorus 5.3 (*)     All other components within normal limits   LIPASE   MAGNESIUM   URINALYSIS             Medical Decision Making:   Initial Assessment:   27-year-old female, ESRD, liver transplant, renovascular hypertension, seizure disorder presents with right upper quadrant abdominal pain that started last night.  No nausea vomiting, changes in bowel habits, shortness of breath, or fever.  She presents in no distress, ill-appearing, with significantly elevated blood pressure (227/134).  Her abdomen is soft with mild right upper quadrant tenderness. No peritoneal signs. No CVAT. Patient scheduled for hemodialysis today at 6:00 a.m..          Emergency department staff note.  27-year-old dialysis patient with right upper quadrant pain.  X-ray shows some vascular congestion consistent with volume overload.  There was some concern of a developing consolidation on the right that might account for her symptoms. We reviewed at length her symptoms and this does not sound like a developing pneumonia.  The patient agreed.  She does not have any fever chills body aches or productive cough.  We collaborated and decided that she did not need antibiotic therapy at this time.  An ultrasound did reveal some ascites that might also be accounting for her symptoms.  There is no sign of biliary obstruction or liver failure.  The patient is resting comfortably in bed.  Her extreme hypertension has improved.  The patient needs dialysis.  She is scheduled for it this morning.  She is stable for discharge. She is not hypoxic or in any acute distress at this time.  Follow up with primary care.    STEVE Segal M.D. 8:10 AM 5/1/2018         Scribe Attestation:   Scribe #1: I performed the above scribed service and the documentation accurately describes the services I performed. I attest to the accuracy of the note.    Attending Attestation:           Physician Attestation for Scribe:  Physician Attestation Statement for Kaci[place]  #1: I, Blanca Wren MD, reviewed documentation, as scribed by Pippa Grover in my presence, and it is both accurate and complete.                    Clinical Impression:   The primary encounter diagnosis was Hypervolemia, unspecified hypervolemia type. Diagnoses of Abdominal pain and Cough were also pertinent to this visit.                           Des Segal MD  05/01/18 [id number]

## 2018-05-03 ENCOUNTER — HOSPITAL ENCOUNTER (EMERGENCY)
Facility: HOSPITAL | Age: 28
Discharge: HOME | End: 2018-05-03
Attending: EMERGENCY MEDICINE
Payer: MEDICARE

## 2018-05-03 VITALS
RESPIRATION RATE: 20 BRPM | WEIGHT: 115 LBS | TEMPERATURE: 99 F | DIASTOLIC BLOOD PRESSURE: 106 MMHG | OXYGEN SATURATION: 98 % | SYSTOLIC BLOOD PRESSURE: 175 MMHG | HEIGHT: 65 IN | HEART RATE: 90 BPM | BODY MASS INDEX: 19.16 KG/M2

## 2018-05-03 DIAGNOSIS — R51.9 ACUTE NONINTRACTABLE HEADACHE, UNSPECIFIED HEADACHE TYPE: ICD-10-CM

## 2018-05-03 DIAGNOSIS — R11.2 NON-INTRACTABLE VOMITING WITH NAUSEA, UNSPECIFIED VOMITING TYPE: ICD-10-CM

## 2018-05-03 DIAGNOSIS — N18.6 ESRD (END STAGE RENAL DISEASE): Primary | ICD-10-CM

## 2018-05-03 DIAGNOSIS — D64.9 ANEMIA, UNSPECIFIED TYPE: ICD-10-CM

## 2018-05-03 LAB
ALBUMIN SERPL BCP-MCNC: 2.7 G/DL
ALP SERPL-CCNC: 530 U/L
ALT SERPL W/O P-5'-P-CCNC: 22 U/L
ANION GAP SERPL CALC-SCNC: 10 MMOL/L
AST SERPL-CCNC: 26 U/L
BASOPHILS # BLD AUTO: 0.02 K/UL
BASOPHILS NFR BLD: 0.6 %
BILIRUB SERPL-MCNC: 0.6 MG/DL
BUN SERPL-MCNC: 8 MG/DL
CALCIUM SERPL-MCNC: 9.4 MG/DL
CHLORIDE SERPL-SCNC: 94 MMOL/L
CO2 SERPL-SCNC: 35 MMOL/L
CREAT SERPL-MCNC: 3.4 MG/DL
DIFFERENTIAL METHOD: ABNORMAL
EOSINOPHIL # BLD AUTO: 0.2 K/UL
EOSINOPHIL NFR BLD: 7 %
ERYTHROCYTE [DISTWIDTH] IN BLOOD BY AUTOMATED COUNT: 16.1 %
EST. GFR  (AFRICAN AMERICAN): 20 ML/MIN/1.73 M^2
EST. GFR  (NON AFRICAN AMERICAN): 18 ML/MIN/1.73 M^2
GLUCOSE SERPL-MCNC: 98 MG/DL
HCT VFR BLD AUTO: 21.2 %
HGB BLD-MCNC: 7.1 G/DL
LYMPHOCYTES # BLD AUTO: 0.5 K/UL
LYMPHOCYTES NFR BLD: 16.2 %
MCH RBC QN AUTO: 27.2 PG
MCHC RBC AUTO-ENTMCNC: 33.5 G/DL
MCV RBC AUTO: 81 FL
MONOCYTES # BLD AUTO: 0.2 K/UL
MONOCYTES NFR BLD: 6.4 %
NEUTROPHILS # BLD AUTO: 2.3 K/UL
NEUTROPHILS NFR BLD: 69.5 %
PLATELET # BLD AUTO: 89 K/UL
PMV BLD AUTO: 10 FL
POTASSIUM SERPL-SCNC: 3.6 MMOL/L
PROT SERPL-MCNC: 8 G/DL
RBC # BLD AUTO: 2.61 M/UL
SODIUM SERPL-SCNC: 139 MMOL/L
WBC # BLD AUTO: 3.27 K/UL

## 2018-05-03 PROCEDURE — 85025 COMPLETE CBC W/AUTO DIFF WBC: CPT

## 2018-05-03 PROCEDURE — 25000003 PHARM REV CODE 250: Performed by: EMERGENCY MEDICINE

## 2018-05-03 PROCEDURE — 99283 EMERGENCY DEPT VISIT LOW MDM: CPT | Mod: 25

## 2018-05-03 PROCEDURE — 96374 THER/PROPH/DIAG INJ IV PUSH: CPT

## 2018-05-03 PROCEDURE — 80053 COMPREHEN METABOLIC PANEL: CPT

## 2018-05-03 PROCEDURE — 63600175 PHARM REV CODE 636 W HCPCS: Performed by: EMERGENCY MEDICINE

## 2018-05-03 RX ORDER — BUTALBITAL, ACETAMINOPHEN AND CAFFEINE 50; 325; 40 MG/1; MG/1; MG/1
1 TABLET ORAL
Status: COMPLETED | OUTPATIENT
Start: 2018-05-03 | End: 2018-05-03

## 2018-05-03 RX ORDER — BUTALBITAL, ACETAMINOPHEN AND CAFFEINE 50; 325; 40 MG/1; MG/1; MG/1
1 TABLET ORAL EVERY 6 HOURS PRN
Qty: 15 TABLET | Refills: 0 | Status: SHIPPED | OUTPATIENT
Start: 2018-05-03 | End: 2018-06-02

## 2018-05-03 RX ORDER — HYDRALAZINE HYDROCHLORIDE 20 MG/ML
20 INJECTION INTRAMUSCULAR; INTRAVENOUS
Status: COMPLETED | OUTPATIENT
Start: 2018-05-03 | End: 2018-05-03

## 2018-05-03 RX ORDER — ONDANSETRON 4 MG/1
4 TABLET, FILM COATED ORAL EVERY 6 HOURS PRN
Qty: 15 TABLET | Refills: 0 | Status: SHIPPED | OUTPATIENT
Start: 2018-05-03 | End: 2019-01-01

## 2018-05-03 RX ORDER — ONDANSETRON 4 MG/1
4 TABLET, ORALLY DISINTEGRATING ORAL
Status: COMPLETED | OUTPATIENT
Start: 2018-05-03 | End: 2018-05-03

## 2018-05-03 RX ADMIN — HYDRALAZINE HYDROCHLORIDE 20 MG: 20 INJECTION INTRAMUSCULAR; INTRAVENOUS at 02:05

## 2018-05-03 RX ADMIN — ONDANSETRON 4 MG: 4 TABLET, ORALLY DISINTEGRATING ORAL at 03:05

## 2018-05-03 RX ADMIN — BUTALBITAL, ACETAMINOPHEN AND CAFFEINE 1 TABLET: 50; 325; 40 TABLET ORAL at 04:05

## 2018-05-03 NOTE — ED PROVIDER NOTES
Encounter Date: 5/3/2018  27-year-old female dialysis patient complaint of hypertension after HD today.  She denies complaint of pain. She presents alert in no distress. Blood pressure 187/121.  Patient will be seen by a provider in exam room for full evaluation and treatment.  Jax PAREKH, 11:42 a.m.  SCRIBE #1 NOTE: I, Shahida Caba, am scribing for, and in the presence of,  Kendra Del Valle MD. I have scribed the following portions of the note - Other sections scribed: HPI, ROS, PE.       History     Chief Complaint   Patient presents with    Hypertension     reports having dialysis and was told to to have elvated BP. denies SOB, and CP     CC: Hypertension    HPI: This 27 y.o female who has ESRD-HD, Renovascular HTN, Secondary hyperparathyroidism, Thrombocytopenia, Sialadenitis, Iron deficiency anemia, Splenomegaly, Immunosuppressed presents to the ED for an elevated blood pressure that began prior to arrival.  Patient reports she was sent to the ED by her nephrologist due to her elevated blood pressure while receiving dialysis treatment.  Patient also reports of nausea and emesis that began while receiving her treatment as well.  Patient reports completing full dialysis treatment before arriving to the ED.  Patient reports for the past few weeks, she has been intermittently having sudden episodes of nausea and emesis.  She reports she is currently out of any anti-medics.  Patient reports she has been compliant with all her HTN medications, last taken this morning, including her Hydralazine and her Clonidine patches.  She reports she is currently due to change her Clonidine patch today.  She reports she was administered Clonidine after her dialysis treatment on today.  Patient denies fever, chills, cough, rhinorrhea, rash, chest pain, shortness of breath, rash, or any other associated symptoms.  No alleviating factors.        The history is provided by the patient. No  was used.     Review  of patient's allergies indicates:   Allergen Reactions    Chloral hydrate      Other reaction(s): Hallucinations  Other reaction(s): Hives    Hydrocodone Other (See Comments)     Mental status changes     Past Medical History:   Diagnosis Date    Anemia in ESRD (end-stage renal disease) 10/12/2015    Chronic rejection of liver transplant 3/22/2016    Encounter for blood transfusion     ESRD on hemodialysis 2015    History of splenomegaly 2016    Immunosuppressed 2017    Iron deficiency anemia secondary to inadequate dietary iron intake 2017    She [facility].   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 ()    Moderate protein-calorie malnutrition 2017    MRSA bacteremia 2017    Prophylactic immunotherapy 2014    Renovascular hypertension 10/2/2015    Secondary hyperparathyroidism 2017    Sialadenitis 3/21/2018    Thrombocytopenia 2016    Thrombocytopenia 2016     Past Surgical History:   Procedure Laterality Date     SECTION      x 2    LIVER BIOPSY      LIVER TRANSPLANT  1992    TUBAL LIGATION  2010     Family History   Problem Relation Age of Onset    Hypertension Mother     Hypertension Father     Melanoma Neg Hx     Breast cancer Neg Hx     Colon cancer Neg Hx     Ovarian cancer Neg Hx      Social History   Substance Use Topics    Smoking status: Never Smoker    Smokeless tobacco: Never Used    Alcohol use No     Review of Systems   Constitutional: Negative for chills and fever.   HENT: Negative for ear pain and sore throat.    Eyes: Negative for pain.   Respiratory: Negative for cough and shortness of breath.    Cardiovascular: Negative for chest pain.   Gastrointestinal: Positive for nausea and vomiting. Negative for abdominal pain and diarrhea.   Genitourinary: Negative for dysuria.   Musculoskeletal: Negative for back pain and neck pain.   Skin:  Negative for rash.   Neurological: Negative for weakness, numbness and headaches.       Physical Exam     Initial Vitals   BP Pulse Resp Temp SpO2   -- -- -- -- --      MAP       --         Physical Exam    Nursing note and vitals reviewed.  Constitutional: She appears well-developed and well-nourished. She is not diaphoretic. She appears ill (chronically; not acutely). No distress.   HENT:   Head: Normocephalic and atraumatic.   Eyes: EOM are normal. Pupils are equal, round, and reactive to light.   Neck: Normal range of motion. Neck supple.   Cardiovascular: Normal rate, regular rhythm and normal heart sounds. Exam reveals no gallop and no friction rub.    Pulmonary/Chest: Breath sounds normal. No respiratory distress. She has no wheezes. She has no rhonchi. She has no rales. She exhibits no tenderness.   Abdominal: Soft. Normal appearance and bowel sounds are normal. She exhibits no distension. There is no tenderness. There is no rebound and no guarding.   Musculoskeletal: Normal range of motion. She exhibits no edema.   Patient has dry and intact dialysis access to the left forearm with good pulse.     Neurological: She is alert and oriented to person, place, and time. She has normal strength. No cranial nerve deficit or sensory deficit.   Skin: Skin is warm and dry. No rash noted.   Psychiatric: She has a normal mood and affect.         ED Course   Procedures  Labs Reviewed - No data to display          Medical Decision Making:   Clinical Tests:   Lab Tests: Ordered and Reviewed  ED Management:  Ms Patel has been stable during her time in the ER. She feels better and wants to go home. She has no complaints. Labs noted. She is stable for /dc. We discussed home care and worrisome signs that should prompt need to return to er should they occur. There is no indication for further emergent intervention or evaluation at this time.               Scribe Attestation:   Scribe #1: I performed the above scribed service  and the documentation accurately describes the services I performed. I attest to the accuracy of the note.    Attending Attestation:           Physician Attestation for Scribe:  Physician Attestation Statement for Scribe #1: I, Kendra Del Valle MD, reviewed documentation, as scribed by Shahida Caba in my presence, and it is both accurate and complete.                    Clinical Impression:   There were no encounter diagnoses.     1. ESRD  2. Anemia, unspecified  3. Acute nonintractable headache  4. Non intractable vomiting with nausea                      Kendra Del Valle MD  05/18/18 0478

## 2018-05-03 NOTE — ED TRIAGE NOTES
"Pt presents to ED after being at dialysis and having vomiting. Pt states that her kidney doctor stated that her BP was high and her blood count was low. Pt states that she does have HTN but her SBP was 287. Vomiting started this AM with a " little bit " of diarrhea. Denies headache CP or abd pain. Will continue to monitor.  "

## 2018-05-08 ENCOUNTER — PATIENT MESSAGE (OUTPATIENT)
Dept: OBSTETRICS AND GYNECOLOGY | Facility: CLINIC | Age: 28
End: 2018-05-08

## 2018-05-08 DIAGNOSIS — N87.1 MODERATE CERVICAL DYSPLASIA, HISTOLOGICALLY CONFIRMED: Primary | ICD-10-CM

## 2018-05-08 NOTE — TELEPHONE ENCOUNTER
Discussed Moderate cervical dysplasia with patient.   Will need medical clearance and recommendations from Transplant team.    Scheduled for LEEP procedure on Kandis 15.

## 2018-05-09 ENCOUNTER — TELEPHONE (OUTPATIENT)
Dept: HEMATOLOGY/ONCOLOGY | Facility: CLINIC | Age: 28
End: 2018-05-09

## 2018-05-09 ENCOUNTER — TELEPHONE (OUTPATIENT)
Dept: OBSTETRICS AND GYNECOLOGY | Facility: CLINIC | Age: 28
End: 2018-05-09

## 2018-05-09 DIAGNOSIS — D69.6 THROMBOCYTOPENIA: Primary | ICD-10-CM

## 2018-05-09 DIAGNOSIS — N87.1 MODERATE CERVICAL DYSPLASIA: ICD-10-CM

## 2018-05-09 NOTE — TELEPHONE ENCOUNTER
Dr. Marroquin and I spoke  with pt's sister Yulissa Randolph [phone] who stated that she is the caregiver of Ms. Patel. Ms. Randolph was given a full explanation of the LEEP procedure along with the scheduled surgery date. Ms. Duenas was also given scheduled pre-op date and times. Pt's sister verbalized understanding.   ----- Message from Neelam Marroquin MD sent at 5/8/2018  1:00 PM CDT -----  Please schedule for anesthesia consult and preop with me about 2 weeks prior to her scheduled LEEP on Kandis 15 (week of June 4 for preop) because she is a transplant patient and we may need to get additional things done prior to surgery.

## 2018-05-15 NOTE — PROGRESS NOTES
CC: Low platelets, initial visit    HPI Holly Patel is a 27 y.o. female with secondary hypertension, h/o liver transplant in , ESRD on HD via [facility] AVF placed 2015. She is here for hematology consultation for low platelets. Denies any overt bleeding from GI/ tracts or nose. No hematuria. No vaginal bleeding or spotting. No recent medication changes or recent travel. She reports losing 30 lbs since early , unintentionally. Her appetite is not 'good'. No fever or night sweats.  Platelet count was normal until 2014. It has declined since then. It ranged between 79k -155 k in , 64k- 122k in , 50k -113k in 2017.   Most recent platelet count is 89k on  5/3/18. She also has significant anemia, with most recent hemoglobin of 7.1. She has mild leukopenia as well.        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 ()    Moderate protein-calorie malnutrition 2017    MRSA bacteremia 2017    Prophylactic immunotherapy 2014    Renovascular hypertension 10/2/2015    Secondary hyperparathyroidism 2017    Sialadenitis 3/21/2018    Thrombocytopenia 2016    Thrombocytopenia 2016           Past Surgical History:   Procedure Laterality Date     SECTION      x 2    LIVER BIOPSY      LIVER TRANSPLANT  1992    TUBAL LIGATION             Social History     Social History    Marital status: Legally      Spouse name: N/A    Number of children: N/A    Years of education: N/A      Occupational History    Not on file.     Social History Main Topics    Smoking status: Never Smoker    Smokeless tobacco: Never Used    Alcohol use No    Drug use: No    Sexual activity: No     Other Topics Concern    Are You Pregnant Or Think You May Be? No    Breast-Feeding No     Social History Narrative    Lives 2 kids, 7 and 8, and nephew. Her mom helps with kids.           Family History   Problem Relation Age of Onset    Hypertension Mother     Hypertension Father     Melanoma Neg Hx     Breast cancer Neg Hx     Colon cancer Neg Hx     Ovarian cancer Neg Hx            Review of patient's allergies indicates:   Allergen Reactions    Chloral hydrate      Other reaction(s): Hallucinations  Other reaction(s): Hives    Hydrocodone Other (See Comments)     Mental status changes       Review of Systems   Constitutional: Positive for malaise/fatigue and weight loss. Negative for chills and fever.   HENT: Negative.    Eyes: Negative.    Respiratory: Negative.    Cardiovascular: Negative.    Gastrointestinal: Negative.    Genitourinary: Negative.    Musculoskeletal: Negative.    Skin: Negative.    Neurological: Negative.  Negative for weakness.   Endo/Heme/Allergies: Bruises/bleeds easily.   Psychiatric/Behavioral: Negative.        Current Outpatient Prescriptions   Medication Sig    aspirin 81 MG Chew Take 1 tablet (81 mg total) by mouth once daily.    butalbital-acetaminophen-caffeine -40 mg (FIORICET, ESGIC) -40 mg per tablet Take 1 tablet by mouth every 6 (six) hours as needed for Headaches.    cinacalcet (SENSIPAR) 30 MG Tab Take 1 tablet (30 mg total) by mouth daily with breakfast.    citalopram (CELEXA) 20 MG tablet Take 1 tablet (20 mg total) by mouth once daily.    cloNIDine 0.2 mg/24 hr td ptwk (CATAPRES) 0.2 mg/24 hr Place 1 patch onto the skin every 7 days. Change every Wednesday    famotidine (PEPCID) 20 MG tablet Take 1 tablet (20 mg total) by mouth 2 (two) times  daily.    food supplemt, lactose-reduced (ENSURE ACTIVE HIGH PROTEIN) Liqd Take 236 mLs by mouth 2 (two) times daily.    hydrALAZINE (APRESOLINE) 50 MG tablet Take 2 tablets (100 mg total) by mouth every 8 (eight) hours.    labetalol (NORMODYNE) 200 MG tablet Take 2 tablets (400 mg total) by mouth every 8 (eight) hours.    NIFEdipine (PROCARDIA-XL) 60 MG (OSM) 24 hr tablet Take 1 tablet (60 mg total) by mouth once daily.    ondansetron (ZOFRAN) 4 MG tablet Take 1 tablet (4 mg total) by mouth every 6 (six) hours.    ondansetron (ZOFRAN) 4 MG tablet Take 1 tablet (4 mg total) by mouth every 6 (six) hours as needed for Nausea.    oxyCODONE (ROXICODONE) 5 MG immediate release tablet Take 1 tablet (5 mg total) by mouth every 4 (four) hours as needed for Pain.    predniSONE (DELTASONE) 1 MG tablet Take 1 mg by mouth every other day.    tacrolimus (PROGRAF) 1 MG Cap Take 7 capsules (7 mg total) by mouth every 12 (twelve) hours.    triamcinolone acetonide 0.1% (KENALOG) 0.1 % ointment AAA on arms, legs, and neck bid x 1-2 wks then prn flares only (Patient taking differently: Apply to affected area(s) on arms, legs, and neck twice daily x 1-2 wks then as needed for flares only)    lacosamide (VIMPAT) 50 mg Tab Take 1 tablet (50 mg total) by mouth every 12 (twelve) hours. (Patient taking differently: Take 50 mg by mouth every 12 (twelve) hours. )    levETIRAcetam (KEPPRA) 500 MG Tab Take 1 tablet (500 mg total) by mouth 2 (two) times daily.     No current facility-administered medications for this visit.          Vitals:    05/16/18 0844   BP: (!) 185/127   Pulse: 90   Resp: 18   Temp: 98.2 °F (36.8 °C)     Physical Exam   Constitutional: She is oriented to person, place, and time. She appears well-developed. No distress.   HENT:   Head: Normocephalic.   Mouth/Throat: No oropharyngeal exudate.   Eyes: Pupils are equal, round, and reactive to light. No scleral icterus.   Neck: Normal range of motion.    Cardiovascular: Normal rate.    Murmur heard.  Pulmonary/Chest: Effort normal.   She has decreased breath sounds in lung bases   Abdominal: She exhibits distension and mass. There is no tenderness. There is no rebound.   She has ascites. She has palpable, non-tender spleen   Musculoskeletal: She exhibits edema.   Lymphadenopathy:     She has no cervical adenopathy.   Neurological: She is alert and oriented to person, place, and time. No cranial nerve deficit.   Skin: Skin is warm.   Psychiatric: She has a normal mood and affect.     Component      Latest Ref Rng & Units 5/3/2018   WBC      3.90 - 12.70 K/uL 3.27 (L)   RBC      4.00 - 5.40 M/uL 2.61 (L)   Hemoglobin      12.0 - 16.0 g/dL 7.1 (L)   Hematocrit      37.0 - 48.5 % 21.2 (L)   MCV      82 - 98 fL 81 (L)   MCH      27.0 - 31.0 pg 27.2   MCHC      32.0 - 36.0 g/dL 33.5   RDW      11.5 - 14.5 % 16.1 (H)   Platelets      150 - 350 K/uL 89 (L)   MPV      9.2 - 12.9 fL 10.0   Gran # (ANC)      1.8 - 7.7 K/uL 2.3   Lymph #      1.0 - 4.8 K/uL 0.5 (L)   Mono #      0.3 - 1.0 K/uL 0.2 (L)   Eos #      0.0 - 0.5 K/uL 0.2   Baso #      0.00 - 0.20 K/uL 0.02   Gran%      38.0 - 73.0 % 69.5   Lymph%      18.0 - 48.0 % 16.2 (L)   Mono%      4.0 - 15.0 % 6.4   Eosinophil%      0.0 - 8.0 % 7.0   Basophil%      0.0 - 1.9 % 0.6   Differential Method       Automated   Sodium      136 - 145 mmol/L 139   Potassium      3.5 - 5.1 mmol/L 3.6   Chloride      95 - 110 mmol/L 94 (L)   CO2      23 - 29 mmol/L 35 (H)   Glucose      70 - 110 mg/dL 98   BUN, Bld      6 - 20 mg/dL 8   Creatinine      0.5 - 1.4 mg/dL 3.4 (H)   Calcium      8.7 - 10.5 mg/dL 9.4   Total Protein      6.0 - 8.4 g/dL 8.0   Albumin      3.5 - 5.2 g/dL 2.7 (L)   Total Bilirubin      0.1 - 1.0 mg/dL 0.6   Alkaline Phosphatase      55 - 135 U/L 530 (H)   AST      10 - 40 U/L 26   ALT      10 - 44 U/L 22   Anion Gap      8 - 16 mmol/L 10   eGFR if African American      >60 mL/min/1.73 m:2 20 (A)   eGFR if non  African American      >60 mL/min/1.73 m:2 18 (A)       Assessment:    1. Thrombocytopenia  2. Leukopenia  3. Anemia  4. ESRD on HD  5. S/p liver transplant  6. Long term immunosuppression      Plan:    1. Platelet count was normal until Nov 2014. It has declined since then. It ranged between 79k -155 k in 2015, 64k- 122k in 2016, 50k -113k in 2017. Most recent platelet count is 89k on  5/3/18. No bleeding diathesis. HBV, HCV serologies negative in 2016.   HIV negative in 2015. FELIX negative in 2015. No recent medication changes. She has been on Prograf since 1992. She will have B12, LDH, folate, serum quantitative immunoglobulins, TFT, haptoglobin, EBV and CMV serologies checked today.  Unlikely to be MAHA/TTP/HUS, although Tacrolimus is known to be associated with TTP.  She has lost 30lb weight since early 2015. CT done in January 2018 did not reveal any mass/adenopathy. However, it was a non-contrast study.  She has chronic splenomegaly, which can also be contributing to cytopenias due to sequestration.  If no clear explanation is available for her cytopenias, bone marrow biopsy might be needed.      2. ANC 2.3 today. Will check EBV and CMV serologies, B12, folate, TFT, SPEP.     3. Possibly multi-factorial--medication ( Prograf), ESRD, nutritional. Will check SPEP, TFT, B12, folate, LDH, reticulocyte count. She has very high BP and is not a candidate for Procrit/Aranesp       Addendum: Fibrinogen is normal. PTT, INR not permitted to be ordered. While her latest platelet count (on 5/3) were 61419, it is likely that the true count is higher, in view of splenic sequestration. She should be able to undergo surgical procedures without any significant bleeding risk with this platelet count, if PTT, INR are normal. However, a unit of platelets should be readily available in case she bleeds intra or post operatively, and needs to be transfused. She will also need a unit of PRBC prior to transfusion.                   Distress Screening Results: Psychosocial Distress screening score of Distress Score: 0 noted and reviewed. No intervention indicated.

## 2018-05-16 ENCOUNTER — LAB VISIT (OUTPATIENT)
Dept: LAB | Facility: HOSPITAL | Age: 28
End: 2018-05-16
Attending: INTERNAL MEDICINE
Payer: MEDICARE

## 2018-05-16 ENCOUNTER — OFFICE VISIT (OUTPATIENT)
Dept: HEMATOLOGY/ONCOLOGY | Facility: CLINIC | Age: 28
End: 2018-05-16
Payer: MEDICARE

## 2018-05-16 VITALS
SYSTOLIC BLOOD PRESSURE: 185 MMHG | HEIGHT: 65 IN | WEIGHT: 115.31 LBS | HEART RATE: 90 BPM | OXYGEN SATURATION: 97 % | TEMPERATURE: 98 F | RESPIRATION RATE: 18 BRPM | DIASTOLIC BLOOD PRESSURE: 127 MMHG | BODY MASS INDEX: 19.21 KG/M2

## 2018-05-16 DIAGNOSIS — R53.82 CHRONIC FATIGUE: ICD-10-CM

## 2018-05-16 DIAGNOSIS — Z94.4 LIVER REPLACED BY TRANSPLANT: Chronic | ICD-10-CM

## 2018-05-16 DIAGNOSIS — D69.6 THROMBOCYTOPENIA: ICD-10-CM

## 2018-05-16 DIAGNOSIS — D63.1 ANEMIA IN ESRD (END-STAGE RENAL DISEASE): Chronic | ICD-10-CM

## 2018-05-16 DIAGNOSIS — D64.9 ANEMIA OF UNKNOWN ETIOLOGY: ICD-10-CM

## 2018-05-16 DIAGNOSIS — N18.6 ANEMIA IN ESRD (END-STAGE RENAL DISEASE): Chronic | ICD-10-CM

## 2018-05-16 DIAGNOSIS — D50.8 IRON DEFICIENCY ANEMIA SECONDARY TO INADEQUATE DIETARY IRON INTAKE: Chronic | ICD-10-CM

## 2018-05-16 DIAGNOSIS — Z29.89 PROPHYLACTIC IMMUNOTHERAPY: ICD-10-CM

## 2018-05-16 DIAGNOSIS — N18.6 ESRD (END STAGE RENAL DISEASE) ON DIALYSIS: ICD-10-CM

## 2018-05-16 DIAGNOSIS — R63.4 WEIGHT LOSS, UNINTENTIONAL: ICD-10-CM

## 2018-05-16 DIAGNOSIS — I15.0 RENOVASCULAR HYPERTENSION: Chronic | ICD-10-CM

## 2018-05-16 DIAGNOSIS — D72.819 LEUKOPENIA, UNSPECIFIED TYPE: ICD-10-CM

## 2018-05-16 DIAGNOSIS — D53.9 MACROCYTIC ANEMIA: ICD-10-CM

## 2018-05-16 DIAGNOSIS — E44.0 MODERATE PROTEIN-CALORIE MALNUTRITION: Primary | ICD-10-CM

## 2018-05-16 DIAGNOSIS — Z99.2 ESRD (END STAGE RENAL DISEASE) ON DIALYSIS: ICD-10-CM

## 2018-05-16 LAB
FERRITIN SERPL-MCNC: 1062 NG/ML
FIBRINOGEN PPP-MCNC: 347 MG/DL
FOLATE SERPL-MCNC: 6.1 NG/ML
HAPTOGLOB SERPL-MCNC: 122 MG/DL
IGA SERPL-MCNC: 335 MG/DL
IGG SERPL-MCNC: 2765 MG/DL
IGM SERPL-MCNC: 99 MG/DL
IRON SERPL-MCNC: 54 UG/DL
LDH SERPL L TO P-CCNC: 165 U/L
RETICS/RBC NFR AUTO: 3.6 %
SATURATED IRON: 18 %
T4 FREE SERPL-MCNC: 1.31 NG/DL
TOTAL IRON BINDING CAPACITY: 292 UG/DL
TRANSFERRIN SERPL-MCNC: 197 MG/DL
TSH SERPL DL<=0.005 MIU/L-ACNC: 3.02 UIU/ML
URATE SERPL-MCNC: 4.7 MG/DL
VIT B12 SERPL-MCNC: 741 PG/ML

## 2018-05-16 PROCEDURE — 84165 PROTEIN E-PHORESIS SERUM: CPT | Mod: 26,,, | Performed by: PATHOLOGY

## 2018-05-16 PROCEDURE — 83540 ASSAY OF IRON: CPT

## 2018-05-16 PROCEDURE — 86664 EPSTEIN-BARR NUCLEAR ANTIGEN: CPT

## 2018-05-16 PROCEDURE — 82728 ASSAY OF FERRITIN: CPT

## 2018-05-16 PROCEDURE — 99205 OFFICE O/P NEW HI 60 MIN: CPT | Mod: S$PBB,,, | Performed by: INTERNAL MEDICINE

## 2018-05-16 PROCEDURE — 83615 LACTATE (LD) (LDH) ENZYME: CPT

## 2018-05-16 PROCEDURE — 82607 VITAMIN B-12: CPT

## 2018-05-16 PROCEDURE — 36415 COLL VENOUS BLD VENIPUNCTURE: CPT

## 2018-05-16 PROCEDURE — 86644 CMV ANTIBODY: CPT

## 2018-05-16 PROCEDURE — 82746 ASSAY OF FOLIC ACID SERUM: CPT

## 2018-05-16 PROCEDURE — 85384 FIBRINOGEN ACTIVITY: CPT

## 2018-05-16 PROCEDURE — 99999 PR PBB SHADOW E&M-EST. PATIENT-LVL IV: CPT | Mod: PBBFAC,,, | Performed by: INTERNAL MEDICINE

## 2018-05-16 PROCEDURE — 84165 PROTEIN E-PHORESIS SERUM: CPT

## 2018-05-16 PROCEDURE — 82784 ASSAY IGA/IGD/IGG/IGM EACH: CPT | Mod: 59

## 2018-05-16 PROCEDURE — 86334 IMMUNOFIX E-PHORESIS SERUM: CPT

## 2018-05-16 PROCEDURE — 99214 OFFICE O/P EST MOD 30 MIN: CPT | Mod: PBBFAC | Performed by: INTERNAL MEDICINE

## 2018-05-16 PROCEDURE — 84439 ASSAY OF FREE THYROXINE: CPT

## 2018-05-16 PROCEDURE — 84550 ASSAY OF BLOOD/URIC ACID: CPT

## 2018-05-16 PROCEDURE — 83520 IMMUNOASSAY QUANT NOS NONAB: CPT

## 2018-05-16 PROCEDURE — 86334 IMMUNOFIX E-PHORESIS SERUM: CPT | Mod: 26,,, | Performed by: PATHOLOGY

## 2018-05-16 PROCEDURE — 83010 ASSAY OF HAPTOGLOBIN QUANT: CPT

## 2018-05-16 PROCEDURE — 84443 ASSAY THYROID STIM HORMONE: CPT

## 2018-05-16 PROCEDURE — 85045 AUTOMATED RETICULOCYTE COUNT: CPT

## 2018-05-16 NOTE — LETTER
May 16, 2018      Neelam Marroquin MD  [address]  [address]  [place] [address]           [facility]  [address]  [place] [address]  Phone: [phone]          Patient: Holly Patel   MR Number: [id number]   YOB: 1990   Date of Visit: 5/16/2018       Dear Dr. Neelam Marroquin:    Thank you for referring Holly Patel to me for evaluation. Attached you will find relevant portions of my assessment and plan of care.    If you have questions, please do not hesitate to call me. I look forward to following Holly Patel along with you.    Sincerely,    Beka Ortega MD    Enclosure  CC:  No Recipients    If you would like to receive this communication electronically, please contact [email or web address] or [phone] to request more information on [employer] access.    For providers and/or their staff who would like to refer a patient to [facility], please contact us through our one-stop-shop provider referral line, [facility], at [phone].    If you feel you have received this communication in error or would no longer like to receive these types of communications, please e-mail [email or web address]

## 2018-05-17 DIAGNOSIS — D69.6 THROMBOCYTOPENIA: ICD-10-CM

## 2018-05-17 DIAGNOSIS — D84.9 IMMUNOSUPPRESSION: Primary | Chronic | ICD-10-CM

## 2018-05-17 LAB
ALBUMIN SERPL ELPH-MCNC: 3.45 G/DL
ALPHA1 GLOB SERPL ELPH-MCNC: 0.43 G/DL
ALPHA2 GLOB SERPL ELPH-MCNC: 0.69 G/DL
B-GLOBULIN SERPL ELPH-MCNC: 0.8 G/DL
EBV EA AB TITR SER: 124 U/ML
EBV NA IGG SER QL: 66 U/ML
EBV VCA IGG SER QL: 204 U/ML
EBV VCA IGM SER-ACNC: 36 U/ML
GAMMA GLOB SERPL ELPH-MCNC: 2.43 G/DL
INTERPRETATION SERPL IFE-IMP: NORMAL
KAPPA LC SER QL IA: 91.96 MG/DL
KAPPA LC/LAMBDA SER IA: 1.86
LAMBDA LC SER QL IA: 49.53 MG/DL
PATHOLOGIST INTERPRETATION IFE: NORMAL
PATHOLOGIST INTERPRETATION SPE: NORMAL
PROT SERPL-MCNC: 7.8 G/DL

## 2018-05-18 ENCOUNTER — TELEPHONE (OUTPATIENT)
Dept: INTERNAL MEDICINE | Facility: CLINIC | Age: 28
End: 2018-05-18

## 2018-05-18 ENCOUNTER — OFFICE VISIT (OUTPATIENT)
Dept: INTERNAL MEDICINE | Facility: CLINIC | Age: 28
End: 2018-05-18
Payer: MEDICARE

## 2018-05-18 ENCOUNTER — TELEPHONE (OUTPATIENT)
Dept: NEUROLOGY | Facility: CLINIC | Age: 28
End: 2018-05-18

## 2018-05-18 VITALS
SYSTOLIC BLOOD PRESSURE: 160 MMHG | WEIGHT: 113.13 LBS | HEIGHT: 65 IN | DIASTOLIC BLOOD PRESSURE: 110 MMHG | HEART RATE: 89 BPM | BODY MASS INDEX: 18.85 KG/M2

## 2018-05-18 DIAGNOSIS — Z99.2 ESRD (END STAGE RENAL DISEASE) ON DIALYSIS: ICD-10-CM

## 2018-05-18 DIAGNOSIS — I15.0 RENOVASCULAR HYPERTENSION: Chronic | ICD-10-CM

## 2018-05-18 DIAGNOSIS — D84.9 IMMUNOSUPPRESSION: Chronic | ICD-10-CM

## 2018-05-18 DIAGNOSIS — Z94.4 LIVER REPLACED BY TRANSPLANT: Chronic | ICD-10-CM

## 2018-05-18 DIAGNOSIS — N18.6 ESRD (END STAGE RENAL DISEASE) ON DIALYSIS: ICD-10-CM

## 2018-05-18 LAB — CMV IGG SERPL QL IA: REACTIVE

## 2018-05-18 PROCEDURE — 99213 OFFICE O/P EST LOW 20 MIN: CPT | Mod: PBBFAC | Performed by: INTERNAL MEDICINE

## 2018-05-18 PROCEDURE — 99214 OFFICE O/P EST MOD 30 MIN: CPT | Mod: S$PBB,,, | Performed by: INTERNAL MEDICINE

## 2018-05-18 PROCEDURE — 99999 PR PBB SHADOW E&M-EST. PATIENT-LVL III: CPT | Mod: PBBFAC,,, | Performed by: INTERNAL MEDICINE

## 2018-05-18 NOTE — TELEPHONE ENCOUNTER
----- Message from Stan Sosa MD sent at 5/18/2018  2:30 PM CDT -----  Hi, please call [facility], [phone], and ask everything that has been filled and dates since 2/1/18. Let me know,  Thank you, Stan Sosa

## 2018-05-18 NOTE — Clinical Note
Hi, please call [facility], [phone], and ask everything that has been filled and dates since 2/1/18. Let me know, Thank you, Stan Sosa

## 2018-05-18 NOTE — PROGRESS NOTES
Subjective:       Patient ID: Holly Patel is a 27 y.o. female.    Chief Complaint: Follow-up    Patient is here for followup for chronic conditions.    She claims to be taking all her meds as rxed.    Still losing wt.    Recent saw heme for low platelet count, being w/ued and will have f/u.    Denies any f/c/ns, no n/v, somewhat decreased appetite, bowels are OK.    Mood is about stable.    Attending HD for last 3 mos, was doing home HD 2 yrs prior.      Review of Systems   Constitutional: Positive for unexpected weight change. Negative for activity change and appetite change.   Eyes: Negative for visual disturbance.   Respiratory: Negative for cough, chest tightness and shortness of breath.    Cardiovascular: Negative for chest pain.   Gastrointestinal: Negative for abdominal distention and abdominal pain.   Genitourinary: Negative for difficulty urinating, menstrual problem and urgency.             Musculoskeletal: Negative for back pain.   Skin: Negative for rash.   Neurological: Negative for seizures (none since a few mos ago).   Psychiatric/Behavioral: The patient is not nervous/anxious.        Objective:      Physical Exam   Constitutional: She is oriented to person, place, and time. She appears well-developed and well-nourished. No distress.   HENT:   Head: Normocephalic and atraumatic.   Eyes: Pupils are equal, round, and reactive to light. No scleral icterus.   Neck: Normal range of motion. No thyromegaly present.   Cardiovascular: Normal rate and regular rhythm.  Exam reveals no gallop and no friction rub.    Murmur (mild HSM murmur at apex) heard.  Pulmonary/Chest: Effort normal and breath sounds normal. No respiratory distress. She has no wheezes. She has no rales.   Abdominal: Soft. Bowel sounds are normal. She exhibits distension (moderate). She exhibits no mass. There is no tenderness. There is no rebound and no guarding.   Musculoskeletal: Normal range of motion. She exhibits no edema or  tenderness.   L arm fistula thrill normal, nontender     Lymphadenopathy:     She has no cervical adenopathy.   Neurological: She is alert and oriented to person, place, and time.   Skin: She is not diaphoretic.   Psychiatric: She has a normal mood and affect. Her speech is normal and behavior is normal. Cognition and memory are normal.       Assessment:       No diagnosis found.    Plan:       Here for f./u.    HTN still not well controlled.  I called local [facility]:  4/30 Nifedipine and citalopram picked up, last clonidine patches (4 in January).  We will call [facility] as well, [phone].  Likely poor adherence to meds is a major issue.  Will see if RN health  can help.    chronic liver ejection -- recently saw liver transplant doctor.    Wt loss -- partially could be from clinic admin HD, she denies any other B symptoms. Has heme investigation undergoing.    Mood/anxiety is OK with celexa.    There are no diagnoses linked to this encounter.    Health Maintenance       Date Due Completion Date    TETANUS VACCINE 09/13/2008 ---    Pneumococcal PPSV23 (High Risk) (1) 11/30/2017 ---    Influenza Vaccine 08/01/2018 12/7/2017 (Done)    Override on 12/7/2017: Done    Pap Smear 03/28/2021 3/28/2018      Has gyn f/u for LEEP  In future pneumovax    Follow-up in about 3 months (around 8/18/2018).

## 2018-05-18 NOTE — TELEPHONE ENCOUNTER
Called [facility] and the lady on the phone told me nothing has been filled by them for the patient since 2016

## 2018-05-18 NOTE — TELEPHONE ENCOUNTER
----- Message from Nila Cooper sent at 5/18/2018  8:00 AM CDT -----  Contact: Pt  Pt is requesting a callback from nurse need to know if she can start back driving     Pt can be reached at [phone]    Thanks    Her last seizure activity was during her hospital stay 3/10/18

## 2018-05-19 DIAGNOSIS — I15.0 RENOVASCULAR HYPERTENSION: Chronic | ICD-10-CM

## 2018-05-19 DIAGNOSIS — I16.1 HYPERTENSIVE EMERGENCY: ICD-10-CM

## 2018-05-19 DIAGNOSIS — I10 UNCONTROLLED HYPERTENSION: ICD-10-CM

## 2018-05-19 RX ORDER — HYDRALAZINE HYDROCHLORIDE 50 MG/1
50 TABLET, FILM COATED ORAL EVERY 8 HOURS
Qty: 90 TABLET | Refills: 5 | Status: ON HOLD | OUTPATIENT
Start: 2018-05-19 | End: 2018-06-29 | Stop reason: HOSPADM

## 2018-05-19 RX ORDER — NIFEDIPINE 90 MG/1
90 TABLET, EXTENDED RELEASE ORAL DAILY
Qty: 30 TABLET | Refills: 11 | Status: ON HOLD | OUTPATIENT
Start: 2018-05-19 | End: 2018-07-08 | Stop reason: HOSPADM

## 2018-05-21 ENCOUNTER — LAB VISIT (OUTPATIENT)
Dept: LAB | Facility: HOSPITAL | Age: 28
End: 2018-05-21
Attending: INTERNAL MEDICINE
Payer: MEDICARE

## 2018-05-21 DIAGNOSIS — D69.6 THROMBOCYTOPENIA: ICD-10-CM

## 2018-05-21 DIAGNOSIS — D84.9 IMMUNOSUPPRESSION: Chronic | ICD-10-CM

## 2018-05-21 DIAGNOSIS — Z94.4 LIVER TRANSPLANTED: ICD-10-CM

## 2018-05-21 LAB
ALBUMIN SERPL BCP-MCNC: 2.9 G/DL
ALP SERPL-CCNC: 582 U/L
ALT SERPL W/O P-5'-P-CCNC: 30 U/L
ANION GAP SERPL CALC-SCNC: 11 MMOL/L
AST SERPL-CCNC: 36 U/L
BASOPHILS # BLD AUTO: 0.01 K/UL
BASOPHILS NFR BLD: 0.3 %
BILIRUB SERPL-MCNC: 0.9 MG/DL
BUN SERPL-MCNC: 38 MG/DL
CALCIUM SERPL-MCNC: 9.4 MG/DL
CHLORIDE SERPL-SCNC: 99 MMOL/L
CO2 SERPL-SCNC: 27 MMOL/L
CREAT SERPL-MCNC: 7.9 MG/DL
DIFFERENTIAL METHOD: ABNORMAL
EOSINOPHIL # BLD AUTO: 0.5 K/UL
EOSINOPHIL NFR BLD: 15.1 %
ERYTHROCYTE [DISTWIDTH] IN BLOOD BY AUTOMATED COUNT: 18.2 %
EST. GFR  (AFRICAN AMERICAN): 7.3 ML/MIN/1.73 M^2
EST. GFR  (NON AFRICAN AMERICAN): 6.4 ML/MIN/1.73 M^2
GLUCOSE SERPL-MCNC: 93 MG/DL
HCT VFR BLD AUTO: 22.9 %
HGB BLD-MCNC: 7.2 G/DL
IMM GRANULOCYTES # BLD AUTO: 0.01 K/UL
IMM GRANULOCYTES NFR BLD AUTO: 0.3 %
LYMPHOCYTES # BLD AUTO: 0.5 K/UL
LYMPHOCYTES NFR BLD: 15.1 %
MCH RBC QN AUTO: 27.2 PG
MCHC RBC AUTO-ENTMCNC: 31.4 G/DL
MCV RBC AUTO: 86 FL
MONOCYTES # BLD AUTO: 0.2 K/UL
MONOCYTES NFR BLD: 4.8 %
NEUTROPHILS # BLD AUTO: 2.1 K/UL
NEUTROPHILS NFR BLD: 64.4 %
NRBC BLD-RTO: 0 /100 WBC
PLATELET # BLD AUTO: 68 K/UL
PMV BLD AUTO: 10.5 FL
POTASSIUM SERPL-SCNC: 3.9 MMOL/L
PROT SERPL-MCNC: 8.2 G/DL
RBC # BLD AUTO: 2.65 M/UL
SODIUM SERPL-SCNC: 137 MMOL/L
TACROLIMUS BLD-MCNC: 1.7 NG/ML
WBC # BLD AUTO: 3.31 K/UL

## 2018-05-21 PROCEDURE — 80197 ASSAY OF TACROLIMUS: CPT

## 2018-05-21 PROCEDURE — 85025 COMPLETE CBC W/AUTO DIFF WBC: CPT

## 2018-05-21 PROCEDURE — 36415 COLL VENOUS BLD VENIPUNCTURE: CPT | Mod: PO

## 2018-05-21 PROCEDURE — 80053 COMPREHEN METABOLIC PANEL: CPT

## 2018-05-22 LAB — EBV DNA BY PCR: NORMAL IU/ML

## 2018-05-23 ENCOUNTER — TELEPHONE (OUTPATIENT)
Dept: TRANSPLANT | Facility: CLINIC | Age: 28
End: 2018-05-23

## 2018-05-23 NOTE — TELEPHONE ENCOUNTER
Letter sent, liver labs stable and no medication changes are needed. Prograf remains undetected despite previous dose changes. Repeat labs due 6/28/18 per Dr. Pinedo.

## 2018-05-23 NOTE — LETTER
May 23, 2018    Holly Patel  [address] [place] [id number]          Dear Holly Patel:  MRN: [id number]    Your liver lab results were stable.  There are no medicine changes.  Your Prograf level remains undetected. Please be compliant with all medications. Please have your labs drawn again on 6/28/18.      If you cannot have your labs drawn on the scheduled date, it is your responsibility to call the transplant department to reschedule.  To reschedule or make an appointment, please as to speak to or leave a message for my assistant, Jaki Chavis, at [phone].  When leaving a message for Palmira Pollack, or myself, we ask that you leave a brief message regarding your request.    Sincerely,    Violeta Francis, RN, BSN  Liver Transplant Coordinator  [facility] [address]  [address] [place] [id number]  [phone]

## 2018-05-26 ENCOUNTER — HOSPITAL ENCOUNTER (INPATIENT)
Facility: HOSPITAL | Age: 28
LOS: 4 days | Discharge: HOME | DRG: 871 | End: 2018-05-30
Attending: EMERGENCY MEDICINE | Admitting: HOSPITALIST
Payer: MEDICARE

## 2018-05-26 DIAGNOSIS — N18.6 ANEMIA IN ESRD (END-STAGE RENAL DISEASE): Chronic | ICD-10-CM

## 2018-05-26 DIAGNOSIS — R50.9 FEVER: ICD-10-CM

## 2018-05-26 DIAGNOSIS — R50.9 ACUTE FEBRILE ILLNESS: ICD-10-CM

## 2018-05-26 DIAGNOSIS — Z99.2 ESRD (END STAGE RENAL DISEASE) ON DIALYSIS: ICD-10-CM

## 2018-05-26 DIAGNOSIS — J18.9 RIGHT LOWER LOBE PNEUMONIA: ICD-10-CM

## 2018-05-26 DIAGNOSIS — R78.81 BACTEREMIA: Primary | ICD-10-CM

## 2018-05-26 DIAGNOSIS — D63.1 ANEMIA IN ESRD (END-STAGE RENAL DISEASE): Chronic | ICD-10-CM

## 2018-05-26 DIAGNOSIS — N18.6 ESRD (END STAGE RENAL DISEASE) ON DIALYSIS: ICD-10-CM

## 2018-05-26 DIAGNOSIS — R52 BODY ACHES: ICD-10-CM

## 2018-05-26 DIAGNOSIS — I15.0 RENOVASCULAR HYPERTENSION: Chronic | ICD-10-CM

## 2018-05-26 DIAGNOSIS — A41.9 SEPSIS: ICD-10-CM

## 2018-05-26 DIAGNOSIS — N18.6 END STAGE RENAL DISEASE: ICD-10-CM

## 2018-05-26 DIAGNOSIS — Z94.4 LIVER REPLACED BY TRANSPLANT: Chronic | ICD-10-CM

## 2018-05-26 DIAGNOSIS — R56.9 SEIZURES: ICD-10-CM

## 2018-05-26 DIAGNOSIS — D84.9 IMMUNOSUPPRESSION: Chronic | ICD-10-CM

## 2018-05-26 LAB
ALBUMIN SERPL BCP-MCNC: 2.7 G/DL
ALP SERPL-CCNC: 512 U/L
ALT SERPL W/O P-5'-P-CCNC: 34 U/L
ANION GAP SERPL CALC-SCNC: 11 MMOL/L
AST SERPL-CCNC: 45 U/L
BASOPHILS # BLD AUTO: 0.01 K/UL
BASOPHILS NFR BLD: 0.1 %
BILIRUB SERPL-MCNC: 0.9 MG/DL
BUN SERPL-MCNC: 14 MG/DL
CALCIUM SERPL-MCNC: 8.3 MG/DL
CHLORIDE SERPL-SCNC: 98 MMOL/L
CO2 SERPL-SCNC: 27 MMOL/L
CREAT SERPL-MCNC: 3.8 MG/DL
DIFFERENTIAL METHOD: ABNORMAL
EOSINOPHIL # BLD AUTO: 0.3 K/UL
EOSINOPHIL NFR BLD: 3.6 %
ERYTHROCYTE [DISTWIDTH] IN BLOOD BY AUTOMATED COUNT: 17.6 %
EST. GFR  (AFRICAN AMERICAN): 18 ML/MIN/1.73 M^2
EST. GFR  (NON AFRICAN AMERICAN): 15 ML/MIN/1.73 M^2
GLUCOSE SERPL-MCNC: 109 MG/DL
HCG INTACT+B SERPL-ACNC: <1.2 MIU/ML
HCT VFR BLD AUTO: 20.1 %
HGB BLD-MCNC: 6.7 G/DL
LACTATE SERPL-SCNC: 0.6 MMOL/L
LACTATE SERPL-SCNC: 0.7 MMOL/L
LYMPHOCYTES # BLD AUTO: 0.5 K/UL
LYMPHOCYTES NFR BLD: 6.9 %
MCH RBC QN AUTO: 27.7 PG
MCHC RBC AUTO-ENTMCNC: 33.3 G/DL
MCV RBC AUTO: 83 FL
MONOCYTES # BLD AUTO: 0.5 K/UL
MONOCYTES NFR BLD: 7.3 %
NEUTROPHILS # BLD AUTO: 5.7 K/UL
NEUTROPHILS NFR BLD: 82.1 %
PLATELET # BLD AUTO: 54 K/UL
PMV BLD AUTO: 10 FL
POTASSIUM SERPL-SCNC: 3.2 MMOL/L
PROT SERPL-MCNC: 7.5 G/DL
RBC # BLD AUTO: 2.42 M/UL
SODIUM SERPL-SCNC: 136 MMOL/L
TROPONIN I SERPL DL<=0.01 NG/ML-MCNC: 0.08 NG/ML
WBC # BLD AUTO: 6.94 K/UL

## 2018-05-26 PROCEDURE — 96366 THER/PROPH/DIAG IV INF ADDON: CPT

## 2018-05-26 PROCEDURE — 87186 SC STD MICRODIL/AGAR DIL: CPT | Mod: 59

## 2018-05-26 PROCEDURE — 63600175 PHARM REV CODE 636 W HCPCS: Performed by: EMERGENCY MEDICINE

## 2018-05-26 PROCEDURE — 83605 ASSAY OF LACTIC ACID: CPT

## 2018-05-26 PROCEDURE — 84702 CHORIONIC GONADOTROPIN TEST: CPT

## 2018-05-26 PROCEDURE — 25000003 PHARM REV CODE 250: Performed by: EMERGENCY MEDICINE

## 2018-05-26 PROCEDURE — 80053 COMPREHEN METABOLIC PANEL: CPT

## 2018-05-26 PROCEDURE — 96365 THER/PROPH/DIAG IV INF INIT: CPT

## 2018-05-26 PROCEDURE — 87040 BLOOD CULTURE FOR BACTERIA: CPT | Mod: 59

## 2018-05-26 PROCEDURE — 93005 ELECTROCARDIOGRAM TRACING: CPT

## 2018-05-26 PROCEDURE — 85025 COMPLETE CBC W/AUTO DIFF WBC: CPT

## 2018-05-26 PROCEDURE — 99285 EMERGENCY DEPT VISIT HI MDM: CPT

## 2018-05-26 PROCEDURE — 12000002 HC ACUTE/MED SURGE SEMI-PRIVATE ROOM

## 2018-05-26 PROCEDURE — 83605 ASSAY OF LACTIC ACID: CPT | Mod: 91

## 2018-05-26 PROCEDURE — 84484 ASSAY OF TROPONIN QUANT: CPT

## 2018-05-26 PROCEDURE — 25000003 PHARM REV CODE 250: Performed by: PHYSICIAN ASSISTANT

## 2018-05-26 PROCEDURE — 96376 TX/PRO/DX INJ SAME DRUG ADON: CPT

## 2018-05-26 PROCEDURE — 96375 TX/PRO/DX INJ NEW DRUG ADDON: CPT

## 2018-05-26 PROCEDURE — 93010 ELECTROCARDIOGRAM REPORT: CPT | Mod: ,,, | Performed by: INTERNAL MEDICINE

## 2018-05-26 PROCEDURE — 87077 CULTURE AEROBIC IDENTIFY: CPT | Mod: 59

## 2018-05-26 PROCEDURE — 96361 HYDRATE IV INFUSION ADD-ON: CPT

## 2018-05-26 RX ORDER — ONDANSETRON 2 MG/ML
4 INJECTION INTRAMUSCULAR; INTRAVENOUS
Status: COMPLETED | OUTPATIENT
Start: 2018-05-26 | End: 2018-05-26

## 2018-05-26 RX ORDER — TACROLIMUS 1 MG/1
4 CAPSULE ORAL ONCE
Status: COMPLETED | OUTPATIENT
Start: 2018-05-27 | End: 2018-05-26

## 2018-05-26 RX ORDER — ACETAMINOPHEN 325 MG/1
650 TABLET ORAL
Status: COMPLETED | OUTPATIENT
Start: 2018-05-26 | End: 2018-05-26

## 2018-05-26 RX ORDER — LACOSAMIDE 50 MG/1
TABLET ORAL EVERY 12 HOURS
Status: ON HOLD | COMMUNITY
End: 2018-06-20

## 2018-05-26 RX ORDER — MORPHINE SULFATE 10 MG/ML
4 INJECTION INTRAMUSCULAR; INTRAVENOUS; SUBCUTANEOUS
Status: COMPLETED | OUTPATIENT
Start: 2018-05-26 | End: 2018-05-26

## 2018-05-26 RX ORDER — MYCOPHENOLATE MOFETIL 250 MG/1
1000 CAPSULE ORAL 2 TIMES DAILY
Status: DISCONTINUED | OUTPATIENT
Start: 2018-05-27 | End: 2018-05-27

## 2018-05-26 RX ORDER — VANCOMYCIN HCL IN 5 % DEXTROSE 1G/250ML
1000 PLASTIC BAG, INJECTION (ML) INTRAVENOUS
Status: COMPLETED | OUTPATIENT
Start: 2018-05-26 | End: 2018-05-26

## 2018-05-26 RX ORDER — TACROLIMUS 1 MG/1
1 CAPSULE ORAL EVERY MORNING
Status: DISCONTINUED | OUTPATIENT
Start: 2018-05-27 | End: 2018-05-26

## 2018-05-26 RX ORDER — SODIUM CHLORIDE 9 MG/ML
500 INJECTION, SOLUTION INTRAVENOUS
Status: COMPLETED | OUTPATIENT
Start: 2018-05-26 | End: 2018-05-26

## 2018-05-26 RX ORDER — MYCOPHENOLATE MOFETIL 500 MG/20ML
1 INJECTION, POWDER, LYOPHILIZED, FOR SOLUTION INTRAVENOUS 2 TIMES DAILY
Status: DISCONTINUED | OUTPATIENT
Start: 2018-05-26 | End: 2018-05-26

## 2018-05-26 RX ORDER — MYCOPHENOLATE MOFETIL 250 MG/1
1000 CAPSULE ORAL 2 TIMES DAILY
Status: DISCONTINUED | OUTPATIENT
Start: 2018-05-27 | End: 2018-05-26

## 2018-05-26 RX ORDER — HYDRALAZINE HYDROCHLORIDE 25 MG/1
50 TABLET, FILM COATED ORAL
Status: COMPLETED | OUTPATIENT
Start: 2018-05-26 | End: 2018-05-26

## 2018-05-26 RX ORDER — TACROLIMUS 1 MG/1
1 CAPSULE ORAL EVERY MORNING
Status: DISCONTINUED | OUTPATIENT
Start: 2018-05-26 | End: 2018-05-26

## 2018-05-26 RX ORDER — LEVETIRACETAM 500 MG/1
500 TABLET ORAL 2 TIMES DAILY
Status: ON HOLD | COMMUNITY
End: 2018-06-20

## 2018-05-26 RX ORDER — LABETALOL 100 MG/1
200 TABLET, FILM COATED ORAL
Status: COMPLETED | OUTPATIENT
Start: 2018-05-26 | End: 2018-05-26

## 2018-05-26 RX ORDER — MORPHINE SULFATE 10 MG/ML
2 INJECTION INTRAMUSCULAR; INTRAVENOUS; SUBCUTANEOUS EVERY 4 HOURS PRN
Status: DISCONTINUED | OUTPATIENT
Start: 2018-05-26 | End: 2018-05-30 | Stop reason: HOSPADM

## 2018-05-26 RX ORDER — MORPHINE SULFATE 10 MG/ML
4 INJECTION INTRAMUSCULAR; INTRAVENOUS; SUBCUTANEOUS EVERY 4 HOURS PRN
Status: DISCONTINUED | OUTPATIENT
Start: 2018-05-26 | End: 2018-05-30 | Stop reason: HOSPADM

## 2018-05-26 RX ORDER — MYCOPHENOLATE MOFETIL 250 MG/1
1000 CAPSULE ORAL 2 TIMES DAILY
Status: ON HOLD | COMMUNITY
End: 2018-09-04 | Stop reason: SDUPTHER

## 2018-05-26 RX ORDER — CEFEPIME HYDROCHLORIDE 1 G/1
1 INJECTION, POWDER, FOR SOLUTION INTRAMUSCULAR; INTRAVENOUS
Status: COMPLETED | OUTPATIENT
Start: 2018-05-26 | End: 2018-05-26

## 2018-05-26 RX ORDER — MYCOPHENOLATE MOFETIL 500 MG/20ML
1 INJECTION, POWDER, LYOPHILIZED, FOR SOLUTION INTRAVENOUS 2 TIMES DAILY
COMMUNITY
End: 2018-05-26

## 2018-05-26 RX ADMIN — LABETALOL HYDROCHLORIDE 200 MG: 100 TABLET, FILM COATED ORAL at 09:05

## 2018-05-26 RX ADMIN — MORPHINE SULFATE 4 MG: 10 INJECTION INTRAVENOUS at 09:05

## 2018-05-26 RX ADMIN — TACROLIMUS 4 MG: 1 CAPSULE ORAL at 11:05

## 2018-05-26 RX ADMIN — VANCOMYCIN HYDROCHLORIDE 1000 MG: 1 INJECTION, POWDER, LYOPHILIZED, FOR SOLUTION INTRAVENOUS at 08:05

## 2018-05-26 RX ADMIN — MORPHINE SULFATE 2 MG: 10 INJECTION INTRAVENOUS at 11:05

## 2018-05-26 RX ADMIN — CEFEPIME 1 G: 1 INJECTION, POWDER, FOR SOLUTION INTRAMUSCULAR; INTRAVENOUS at 08:05

## 2018-05-26 RX ADMIN — ONDANSETRON 4 MG: 2 INJECTION INTRAMUSCULAR; INTRAVENOUS at 09:05

## 2018-05-26 RX ADMIN — TACROLIMUS 1 MG: 1 CAPSULE ORAL at 09:05

## 2018-05-26 RX ADMIN — SODIUM CHLORIDE 500 ML: 0.9 INJECTION, SOLUTION INTRAVENOUS at 07:05

## 2018-05-26 RX ADMIN — ACETAMINOPHEN 650 MG: 325 TABLET ORAL at 06:05

## 2018-05-26 RX ADMIN — HYDRALAZINE HYDROCHLORIDE 50 MG: 25 TABLET ORAL at 09:05

## 2018-05-26 NOTE — ED PROVIDER NOTES
Encounter Date: 2018    SORT:  27 y.o. female presenting to ED for fever associated with generalized myalgias. Went to dialysis today. Denies HA, nausea, vomiting, SOB, CP, abdominal pain, urinary symptoms. Limited triage exam:  Non-toxic. If orders were placed, they are pending.     Albaro Patrick PA-C  2018  6:46 PM     NEW NOTE STARTED BY ACCEPTING PROVIDER. PLEASE REFER TO ACCEPTING PROVIDER'S NOTE.      History     Chief Complaint   Patient presents with    Generalized Body Aches     body aches x 3 days.  fever since dialysis this am.  denies diarrhea or fever.       HPI  Review of patient's allergies indicates:   Allergen Reactions    Chloral hydrate      Other reaction(s): Hallucinations  Other reaction(s): Hives    Hydrocodone Other (See Comments)     Mental status changes     Past Medical History:   Diagnosis Date    Anemia in ESRD (end-stage renal disease) 10/12/2015    Chronic rejection of liver transplant 3/22/2016    Encounter for blood transfusion     ESRD on hemodialysis 2015    History of splenomegaly 2016    Immunosuppressed 2017    Iron deficiency anemia secondary to inadequate dietary iron intake 2017    [facility].   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 ()    Moderate protein-calorie malnutrition 2017    MRSA bacteremia 2017    Prophylactic immunotherapy 2014    Renovascular hypertension 10/2/2015    Secondary hyperparathyroidism 2017    Sialadenitis 3/21/2018    Thrombocytopenia 2016    Thrombocytopenia 2016     Past Surgical History:   Procedure Laterality Date     SECTION      x 2    LIVER BIOPSY      LIVER TRANSPLANT  1992    TUBAL LIGATION  2010     Family History   Problem Relation Age of Onset    Hypertension Mother     Hypertension Father     Melanoma Neg Hx     Breast cancer Neg Hx     Colon cancer Neg Hx      Ovarian cancer Neg Hx      Social History   Substance Use Topics    Smoking status: Never Smoker    Smokeless tobacco: Never Used    Alcohol use No     Review of Systems    Physical Exam     Initial Vitals [05/26/18 1840]   BP Pulse Resp Temp SpO2   (!) 190/120 (!) 122 18 (!) 102.2 °F (39 °C) 99 %      MAP       143.33         Physical Exam    ED Course   Procedures  Labs Reviewed   CBC W/ AUTO DIFFERENTIAL   COMPREHENSIVE METABOLIC PANEL   URINALYSIS   LACTIC ACID, PLASMA                                  Clinical Impression:   The encounter diagnosis was Acute febrile illness.

## 2018-05-27 PROBLEM — J18.9 RIGHT LOWER LOBE PNEUMONIA: Status: ACTIVE | Noted: 2018-05-27

## 2018-05-27 LAB
ABO + RH BLD: NORMAL
ALBUMIN SERPL BCP-MCNC: 2.5 G/DL
ALP SERPL-CCNC: 472 U/L
ALT SERPL W/O P-5'-P-CCNC: 37 U/L
ANION GAP SERPL CALC-SCNC: 12 MMOL/L
AST SERPL-CCNC: 50 U/L
BASOPHILS # BLD AUTO: 0 K/UL
BASOPHILS NFR BLD: 0 %
BILIRUB SERPL-MCNC: 0.9 MG/DL
BLD GP AB SCN CELLS X3 SERPL QL: NORMAL
BUN SERPL-MCNC: 17 MG/DL
CALCIUM SERPL-MCNC: 8.4 MG/DL
CHLORIDE SERPL-SCNC: 98 MMOL/L
CO2 SERPL-SCNC: 25 MMOL/L
CREAT SERPL-MCNC: 4.6 MG/DL
DIFFERENTIAL METHOD: ABNORMAL
EOSINOPHIL # BLD AUTO: 0.3 K/UL
EOSINOPHIL NFR BLD: 5.5 %
ERYTHROCYTE [DISTWIDTH] IN BLOOD BY AUTOMATED COUNT: 17.6 %
EST. GFR  (AFRICAN AMERICAN): 14 ML/MIN/1.73 M^2
EST. GFR  (NON AFRICAN AMERICAN): 12 ML/MIN/1.73 M^2
GLUCOSE SERPL-MCNC: 106 MG/DL
HCT VFR BLD AUTO: 20.4 %
HGB BLD-MCNC: 6.7 G/DL
LYMPHOCYTES # BLD AUTO: 0.5 K/UL
LYMPHOCYTES NFR BLD: 8.6 %
MCH RBC QN AUTO: 27.2 PG
MCHC RBC AUTO-ENTMCNC: 32.8 G/DL
MCV RBC AUTO: 83 FL
MONOCYTES # BLD AUTO: 0.4 K/UL
MONOCYTES NFR BLD: 6.7 %
NEUTROPHILS # BLD AUTO: 4.6 K/UL
NEUTROPHILS NFR BLD: 79.4 %
PLATELET # BLD AUTO: 50 K/UL
PLATELET BLD QL SMEAR: ABNORMAL
PMV BLD AUTO: ABNORMAL FL
POTASSIUM SERPL-SCNC: 3.5 MMOL/L
PROT SERPL-MCNC: 7.2 G/DL
RBC # BLD AUTO: 2.46 M/UL
SODIUM SERPL-SCNC: 135 MMOL/L
TROPONIN I SERPL DL<=0.01 NG/ML-MCNC: 0.06 NG/ML
TROPONIN I SERPL DL<=0.01 NG/ML-MCNC: 0.07 NG/ML
WBC # BLD AUTO: 5.8 K/UL

## 2018-05-27 PROCEDURE — 25000003 PHARM REV CODE 250: Performed by: EMERGENCY MEDICINE

## 2018-05-27 PROCEDURE — 25000242 PHARM REV CODE 250 ALT 637 W/ HCPCS: Performed by: HOSPITALIST

## 2018-05-27 PROCEDURE — 63600175 PHARM REV CODE 636 W HCPCS: Performed by: EMERGENCY MEDICINE

## 2018-05-27 PROCEDURE — 36415 COLL VENOUS BLD VENIPUNCTURE: CPT

## 2018-05-27 PROCEDURE — 85025 COMPLETE CBC W/AUTO DIFF WBC: CPT

## 2018-05-27 PROCEDURE — 86850 RBC ANTIBODY SCREEN: CPT

## 2018-05-27 PROCEDURE — 94640 AIRWAY INHALATION TREATMENT: CPT

## 2018-05-27 PROCEDURE — 25000003 PHARM REV CODE 250: Performed by: HOSPITALIST

## 2018-05-27 PROCEDURE — 63600175 PHARM REV CODE 636 W HCPCS: Performed by: HOSPITALIST

## 2018-05-27 PROCEDURE — 11000001 HC ACUTE MED/SURG PRIVATE ROOM

## 2018-05-27 PROCEDURE — 84484 ASSAY OF TROPONIN QUANT: CPT | Mod: 91

## 2018-05-27 PROCEDURE — 94761 N-INVAS EAR/PLS OXIMETRY MLT: CPT

## 2018-05-27 PROCEDURE — 94799 UNLISTED PULMONARY SVC/PX: CPT

## 2018-05-27 PROCEDURE — 80053 COMPREHEN METABOLIC PANEL: CPT

## 2018-05-27 RX ORDER — FAMOTIDINE 20 MG/1
20 TABLET, FILM COATED ORAL DAILY
Status: DISCONTINUED | OUTPATIENT
Start: 2018-05-27 | End: 2018-05-30 | Stop reason: HOSPADM

## 2018-05-27 RX ORDER — FAMOTIDINE 20 MG/1
20 TABLET, FILM COATED ORAL 2 TIMES DAILY
Status: DISCONTINUED | OUTPATIENT
Start: 2018-05-27 | End: 2018-05-27 | Stop reason: DRUGHIGH

## 2018-05-27 RX ORDER — ONDANSETRON 2 MG/ML
4 INJECTION INTRAMUSCULAR; INTRAVENOUS EVERY 8 HOURS PRN
Status: DISCONTINUED | OUTPATIENT
Start: 2018-05-27 | End: 2018-05-30 | Stop reason: HOSPADM

## 2018-05-27 RX ORDER — HYDRALAZINE HYDROCHLORIDE 25 MG/1
50 TABLET, FILM COATED ORAL EVERY 8 HOURS
Status: DISCONTINUED | OUTPATIENT
Start: 2018-05-27 | End: 2018-05-30 | Stop reason: HOSPADM

## 2018-05-27 RX ORDER — LABETALOL 100 MG/1
400 TABLET, FILM COATED ORAL EVERY 8 HOURS
Status: DISCONTINUED | OUTPATIENT
Start: 2018-05-27 | End: 2018-05-30 | Stop reason: HOSPADM

## 2018-05-27 RX ORDER — TACROLIMUS 1 MG/1
7 CAPSULE ORAL EVERY MORNING
Status: DISCONTINUED | OUTPATIENT
Start: 2018-05-27 | End: 2018-05-30 | Stop reason: HOSPADM

## 2018-05-27 RX ORDER — PREDNISONE 1 MG/1
1 TABLET ORAL EVERY OTHER DAY
Status: DISCONTINUED | OUTPATIENT
Start: 2018-05-27 | End: 2018-05-30 | Stop reason: HOSPADM

## 2018-05-27 RX ORDER — LEVETIRACETAM 500 MG/1
500 TABLET ORAL 2 TIMES DAILY
Status: DISCONTINUED | OUTPATIENT
Start: 2018-05-27 | End: 2018-05-30 | Stop reason: HOSPADM

## 2018-05-27 RX ORDER — GUAIFENESIN/DEXTROMETHORPHAN 100-10MG/5
10 SYRUP ORAL EVERY 6 HOURS
Status: DISCONTINUED | OUTPATIENT
Start: 2018-05-27 | End: 2018-05-30 | Stop reason: HOSPADM

## 2018-05-27 RX ORDER — BENZONATATE 100 MG/1
100 CAPSULE ORAL 3 TIMES DAILY PRN
Status: DISCONTINUED | OUTPATIENT
Start: 2018-05-27 | End: 2018-05-30 | Stop reason: HOSPADM

## 2018-05-27 RX ORDER — IPRATROPIUM BROMIDE AND ALBUTEROL SULFATE 2.5; .5 MG/3ML; MG/3ML
3 SOLUTION RESPIRATORY (INHALATION) EVERY 6 HOURS
Status: DISCONTINUED | OUTPATIENT
Start: 2018-05-27 | End: 2018-05-30 | Stop reason: HOSPADM

## 2018-05-27 RX ORDER — NAPROXEN SODIUM 220 MG/1
81 TABLET, FILM COATED ORAL DAILY
Status: DISCONTINUED | OUTPATIENT
Start: 2018-05-27 | End: 2018-05-30 | Stop reason: HOSPADM

## 2018-05-27 RX ORDER — NIFEDIPINE 30 MG/1
90 TABLET, EXTENDED RELEASE ORAL DAILY
Status: DISCONTINUED | OUTPATIENT
Start: 2018-05-27 | End: 2018-05-30 | Stop reason: HOSPADM

## 2018-05-27 RX ORDER — CLONIDINE 0.2 MG/24H
1 PATCH, EXTENDED RELEASE TRANSDERMAL
Status: DISCONTINUED | OUTPATIENT
Start: 2018-05-27 | End: 2018-05-30 | Stop reason: HOSPADM

## 2018-05-27 RX ORDER — ACETAMINOPHEN 325 MG/1
650 TABLET ORAL EVERY 8 HOURS PRN
Status: DISCONTINUED | OUTPATIENT
Start: 2018-05-27 | End: 2018-05-30 | Stop reason: HOSPADM

## 2018-05-27 RX ORDER — MYCOPHENOLATE MOFETIL 250 MG/1
250 CAPSULE ORAL 2 TIMES DAILY
Status: DISCONTINUED | OUTPATIENT
Start: 2018-05-27 | End: 2018-05-30 | Stop reason: HOSPADM

## 2018-05-27 RX ORDER — LACOSAMIDE 50 MG/1
50 TABLET ORAL EVERY 12 HOURS
Status: DISCONTINUED | OUTPATIENT
Start: 2018-05-27 | End: 2018-05-30 | Stop reason: HOSPADM

## 2018-05-27 RX ORDER — CINACALCET 30 MG/1
30 TABLET, FILM COATED ORAL
Status: DISCONTINUED | OUTPATIENT
Start: 2018-05-27 | End: 2018-05-30 | Stop reason: HOSPADM

## 2018-05-27 RX ORDER — CITALOPRAM 20 MG/1
20 TABLET, FILM COATED ORAL DAILY
Status: DISCONTINUED | OUTPATIENT
Start: 2018-05-27 | End: 2018-05-30 | Stop reason: HOSPADM

## 2018-05-27 RX ORDER — CEFEPIME HYDROCHLORIDE 1 G/50ML
1 INJECTION, SOLUTION INTRAVENOUS
Status: DISCONTINUED | OUTPATIENT
Start: 2018-05-27 | End: 2018-05-30

## 2018-05-27 RX ORDER — BUTALBITAL, ACETAMINOPHEN AND CAFFEINE 50; 325; 40 MG/1; MG/1; MG/1
1 TABLET ORAL EVERY 6 HOURS PRN
Status: DISCONTINUED | OUTPATIENT
Start: 2018-05-27 | End: 2018-05-30 | Stop reason: HOSPADM

## 2018-05-27 RX ADMIN — GUAIFENESIN AND DEXTROMETHORPHAN 10 ML: 100; 10 SYRUP ORAL at 05:05

## 2018-05-27 RX ADMIN — IPRATROPIUM BROMIDE AND ALBUTEROL SULFATE 3 ML: .5; 2.5 SOLUTION RESPIRATORY (INHALATION) at 01:05

## 2018-05-27 RX ADMIN — TACROLIMUS 7 MG: 1 CAPSULE ORAL at 08:05

## 2018-05-27 RX ADMIN — HYDRALAZINE HYDROCHLORIDE 50 MG: 25 TABLET ORAL at 10:05

## 2018-05-27 RX ADMIN — MYCOPHENOLATE MOFETIL 250 MG: 250 CAPSULE ORAL at 12:05

## 2018-05-27 RX ADMIN — NIFEDIPINE 90 MG: 30 TABLET, FILM COATED, EXTENDED RELEASE ORAL at 08:05

## 2018-05-27 RX ADMIN — MYCOPHENOLATE MOFETIL 250 MG: 250 CAPSULE ORAL at 09:05

## 2018-05-27 RX ADMIN — LABETALOL HYDROCHLORIDE 400 MG: 100 TABLET, FILM COATED ORAL at 10:05

## 2018-05-27 RX ADMIN — GUAIFENESIN AND DEXTROMETHORPHAN 10 ML: 100; 10 SYRUP ORAL at 11:05

## 2018-05-27 RX ADMIN — PREDNISONE 1 MG: 1 TABLET ORAL at 08:05

## 2018-05-27 RX ADMIN — LEVETIRACETAM 500 MG: 500 TABLET, FILM COATED ORAL at 08:05

## 2018-05-27 RX ADMIN — MYCOPHENOLATE MOFETIL 250 MG: 250 CAPSULE ORAL at 08:05

## 2018-05-27 RX ADMIN — CLONIDINE 1 PATCH: 0.2 PATCH TRANSDERMAL at 08:05

## 2018-05-27 RX ADMIN — ONDANSETRON 4 MG: 2 INJECTION INTRAMUSCULAR; INTRAVENOUS at 12:05

## 2018-05-27 RX ADMIN — CEFEPIME 1 G: 1 INJECTION, POWDER, FOR SOLUTION INTRAMUSCULAR; INTRAVENOUS at 09:05

## 2018-05-27 RX ADMIN — GUAIFENESIN AND DEXTROMETHORPHAN 10 ML: 100; 10 SYRUP ORAL at 12:05

## 2018-05-27 RX ADMIN — HYDRALAZINE HYDROCHLORIDE 50 MG: 25 TABLET ORAL at 06:05

## 2018-05-27 RX ADMIN — ASPIRIN 81 MG CHEWABLE TABLET 81 MG: 81 TABLET CHEWABLE at 08:05

## 2018-05-27 RX ADMIN — LABETALOL HYDROCHLORIDE 400 MG: 100 TABLET, FILM COATED ORAL at 06:05

## 2018-05-27 RX ADMIN — CITALOPRAM HYDROBROMIDE 20 MG: 20 TABLET ORAL at 08:05

## 2018-05-27 RX ADMIN — ONDANSETRON 4 MG: 2 INJECTION INTRAMUSCULAR; INTRAVENOUS at 11:05

## 2018-05-27 RX ADMIN — LACOSAMIDE 50 MG: 50 TABLET, FILM COATED ORAL at 08:05

## 2018-05-27 RX ADMIN — IPRATROPIUM BROMIDE AND ALBUTEROL SULFATE 3 ML: .5; 2.5 SOLUTION RESPIRATORY (INHALATION) at 07:05

## 2018-05-27 RX ADMIN — CINACALCET HYDROCHLORIDE 30 MG: 30 TABLET, COATED ORAL at 08:05

## 2018-05-27 RX ADMIN — LACOSAMIDE 50 MG: 50 TABLET, FILM COATED ORAL at 09:05

## 2018-05-27 RX ADMIN — HYDRALAZINE HYDROCHLORIDE 50 MG: 25 TABLET ORAL at 01:05

## 2018-05-27 RX ADMIN — GUAIFENESIN AND DEXTROMETHORPHAN 10 ML: 100; 10 SYRUP ORAL at 06:05

## 2018-05-27 RX ADMIN — LABETALOL HYDROCHLORIDE 400 MG: 100 TABLET, FILM COATED ORAL at 01:05

## 2018-05-27 RX ADMIN — LEVETIRACETAM 500 MG: 500 TABLET, FILM COATED ORAL at 01:05

## 2018-05-27 RX ADMIN — FAMOTIDINE 20 MG: 20 TABLET ORAL at 08:05

## 2018-05-27 RX ADMIN — LEVETIRACETAM 500 MG: 500 TABLET, FILM COATED ORAL at 09:05

## 2018-05-27 RX ADMIN — LACOSAMIDE 50 MG: 50 TABLET, FILM COATED ORAL at 01:05

## 2018-05-27 RX ADMIN — IPRATROPIUM BROMIDE AND ALBUTEROL SULFATE 3 ML: .5; 2.5 SOLUTION RESPIRATORY (INHALATION) at 08:05

## 2018-05-27 NOTE — H&P
[facility] Hospital Medicine  History & Physical    Patient Name: Holly Patel  MRN: [id number]  Admission Date: 2018  Attending Physician: Philip Sargent MD, MPH      PCP:     Stan Sosa MD    CC:     Chief Complaint   Patient presents with    Generalized Body Aches     body aches x 3 days.  fever since dialysis this am.  denies diarrhea or fever.         HISTORY OF PRESENT ILLNESS:     Holly Patel is a 27 y.o. female that (in part)  has a past medical history of Anemia in ESRD (end-stage renal disease); Chronic rejection of liver transplant; Encounter for blood transfusion; ESRD on hemodialysis; History of splenomegaly; Immunosuppressed; Iron deficiency anemia secondary to inadequate dietary iron intake; Liver replaced by transplant; Moderate protein-calorie malnutrition; MRSA bacteremia; Prophylactic immunotherapy; Renovascular hypertension; Secondary hyperparathyroidism; Sialadenitis; Thrombocytopenia; and Thrombocytopenia.  has a past surgical history that includes Liver transplant (1992); Liver biopsy;  section; and Tubal ligation ().   Presents to [facility] Emergency Department complaining of fever beginning with acute onset at dialysis this morning.  Associated with generalized body aches and generalized fatigue for the last 3 days.  Occasional nonproductive cough.  Denies chest pain, abdominal pain, diarrhea, sore throat, ear pain, sinus pain, congestion, or other obvious infections or wounds.  Reports compliance with her home medication regimen.  Immunocompromised state due to liver transplant.  Denies recent travel or sick contacts, although she is a dialysis patient.    In the emergency department routine laboratory studies, chest x-ray, and cultures were obtained.  Portable x-ray was concerning for possible pneumonia which led to a PA and lateral demonstrating some equivocal evidence for right lower lobe  pneumonia versus atelectasis.  Given the patient's immunocompromised state and fever, empiric antibiotics were initiated and the patient is being admitted for further evaluation and treatment.    Hospital medicine has been asked to admit for further evaluation and treatment.       REVIEW OF SYSTEMS:     -- Constitutional:+ Fever and generalized fatigue.    -- Eyes: No visual changes, diplopia, pain, tearing, blind spots, or discharge.   -- Ears, nose, mouth, throat, and face: No congestion, sore throat, epistaxis, d/c, bleeding gums, neck stiffness masses, or dental issues.  -- Respiratory: No cough, shortness of breath, hemoptysis, stridor, wheezing, or night sweats.  -- Cardiovascular: No chest pain, MENDOSA, syncope, PND, edema, cyanosis, or palpitations.   -- Gastrointestinal: No vomiting, abdominal pain, hematemesis, melena, dyspepsia, or change in bowel habits.  -- Genitourinary: End-stage renal disease on dialysis.;  Little to no urine production  -- Integument/breast: No rash, pruritis, pigmentation changes, dryness, or changes in hair  -- Hematologic/lymphatic: No easy bruising or lymphadenopathy.   -- Musculoskeletal: No acute arthralgias, acute myalgias, joint swelling, acute limitations of ROM, or acute muscular weakness.  -- Neurological: No seizures, headaches, incoordination, paraesthesias, ataxia, vertigo, or tremors.  -- Behavioral/Psych: No auditory or visual hallucinations, depression, or suicidal/homicidal ideations.  -- Endocrine: No heat or cold intolerance, polydipsia, or unintentional weight gain / loss.  -- Allergy/Immunologic:  + Chronically immunocompromised state.  No recurrent infections or adverse reaction to food, insects, or difficulty breathing.        PAST MEDICAL / SURGICAL HISTORY:     Past Medical History:   Diagnosis Date    Anemia in ESRD (end-stage renal disease) 10/12/2015    Chronic rejection of liver transplant 3/22/2016    Encounter for blood transfusion     ESRD on  hemodialysis 2015    History of splenomegaly 2016    Immunosuppressed 2017    Iron deficiency anemia secondary to inadequate dietary iron intake 2017    She [facility].   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 ()    Moderate protein-calorie malnutrition 2017    MRSA bacteremia 2017    Prophylactic immunotherapy 2014    Renovascular hypertension 10/2/2015    Secondary hyperparathyroidism 2017    Sialadenitis 3/21/2018    Thrombocytopenia 2016    Thrombocytopenia 2016     Past Surgical History:   Procedure Laterality Date     SECTION      x 2    LIVER BIOPSY      LIVER TRANSPLANT  1992    TUBAL LIGATION           FAMILY HISTORY:     Family History   Problem Relation Age of Onset    Hypertension Mother     Hypertension Father     Melanoma Neg Hx     Breast cancer Neg Hx     Colon cancer Neg Hx     Ovarian cancer Neg Hx          SOCIAL HISTORY:     Social History     Social History    Marital status: Legally      Spouse name: N/A    Number of children: N/A    Years of education: N/A     Social History Main Topics    Smoking status: Never Smoker    Smokeless tobacco: Never Used    Alcohol use No    Drug use: No    Sexual activity: No     Other Topics Concern    Are You Pregnant Or Think You May Be? No    Breast-Feeding No     Social History Narrative    Lives 2 kids, 7 and 8, and nephew. Her mom helps with kids.         ALLERGIES:       Review of patient's allergies indicates:   Allergen Reactions    Chloral hydrate      Other reaction(s): Hallucinations  Other reaction(s): Hives    Hydrocodone Other (See Comments)     Mental status changes         HEALTH SCREENING:     Influenza vaccine  up-to-date for this season.  Prevnar 13 pneumonia vaccine =  evidence of previous vaccination found in the medical record      HOME MEDICATIONS:     Prior  to Admission medications    Medication Sig Start Date End Date Taking? Authorizing Provider   aspirin 81 MG Chew Take 1 tablet (81 mg total) by mouth once daily. 2/21/18 2/21/19 Yes Farheen Tellez MD   cinacalcet (SENSIPAR) 30 MG Tab Take 1 tablet (30 mg total) by mouth daily with breakfast. 1/26/18 1/26/19 Yes Jacky Escalera MD   cloNIDine 0.2 mg/24 hr td ptwk (CATAPRES) 0.2 mg/24 hr Place 1 patch onto the skin every 7 days. Change every Wednesday 3/14/18 3/14/19 Yes Stan Sosa MD   famotidine (PEPCID) 20 MG tablet Take 1 tablet (20 mg total) by mouth 2 (two) times daily. 1/7/18 1/7/19 Yes Rosales Liu MD   hydrALAZINE (APRESOLINE) 50 MG tablet Take 1 tablet (50 mg total) by mouth every 8 (eight) hours. 5/19/18  Yes Viktor Bass MD   labetalol (NORMODYNE) 200 MG tablet Take 2 tablets (400 mg total) by mouth every 8 (eight) hours. 3/14/18  Yes Stan Sosa MD   lacosamide (VIMPAT) 50 mg Tab Take by mouth every 12 (twelve) hours.   Yes Historical Provider, MD   levETIRAcetam (KEPPRA) 500 MG Tab Take 500 mg by mouth 2 (two) times daily.   Yes Historical Provider, MD   mycophenolate (CELLCEPT) 250 mg Cap Take 1,000 mg by mouth 2 (two) times daily.   Yes Historical Provider, MD   NIFEdipine (PROCARDIA-XL) 90 MG (OSM) 24 hr tablet Take 1 tablet (90 mg total) by mouth once daily.  Patient taking differently: Take 60 mg by mouth once daily.  5/19/18  Yes Viktor Bass MD   predniSONE (DELTASONE) 1 MG tablet Take 1 mg by mouth every other day.   Yes Historical Provider, MD   tacrolimus (PROGRAF) 1 MG Cap Take 7 capsules (7 mg total) by mouth every 12 (twelve) hours. 3/23/18 3/23/19 Yes Lei Pinedo MD   butalbital-acetaminophen-caffeine -40 mg (FIORICET, ESGIC) -40 mg per tablet Take 1 tablet by mouth every 6 (six) hours as needed for Headaches. 5/3/18 6/2/18  Kendra Del Valle MD   citalopram (CELEXA) 20 MG tablet Take 1 tablet (20 mg total) by mouth once daily.  4/25/18 4/25/19  SAM Huerta   food supplemt, lactose-reduced (ENSURE ACTIVE HIGH PROTEIN) Liqd Take 236 mLs by mouth 2 (two) times daily. 8/16/17   Galindo Amor MD   ondansetron (ZOFRAN) 4 MG tablet Take 1 tablet (4 mg total) by mouth every 6 (six) hours. 4/25/18   SAM Huerta   ondansetron (ZOFRAN) 4 MG tablet Take 1 tablet (4 mg total) by mouth every 6 (six) hours as needed for Nausea. 5/3/18   Kendra Del Valle MD   triamcinolone acetonide 0.1% (KENALOG) 0.1 % ointment AAA on arms, legs, and neck bid x 1-2 wks then prn flares only  Patient taking differently: Apply to affected area(s) on arms, legs, and neck twice daily x 1-2 wks then as needed for flares only 12/31/14   Diana Grider MD          [facility] MEDICATIONS:     Scheduled Meds:    albuterol-ipratropium  3 mL Nebulization Q6H    aspirin  81 mg Oral Daily    cinacalcet  30 mg Oral Daily with breakfast    citalopram  20 mg Oral Daily    cloNIDine 0.2 mg/24 hr td ptwk  1 patch Transdermal Q7 Days    dextromethorphan-guaifenesin  mg/5 ml  10 mL Oral Q6H    famotidine  20 mg Oral BID    hydrALAZINE  50 mg Oral Q8H    labetalol  400 mg Oral Q8H    lacosamide  50 mg Oral Q12H    levETIRAcetam  500 mg Oral BID    mycophenolate  250 mg Oral BID    NIFEdipine  90 mg Oral Daily    predniSONE  1 mg Oral Every other day    tacrolimus  7 mg Oral Daily     Continuous Infusions:   PRN Meds: acetaminophen, benzonatate, butalbital-acetaminophen-caffeine -40 mg, morphine, morphine, ondansetron      PHYSICAL EXAM:     Wt Readings from Last 1 Encounters:   05/27/18 0041 50 kg (110 lb 3.7 oz)   05/26/18 1840 50.3 kg (111 lb)     Body mass index is 18.34 kg/m².  Vitals:    05/26/18 2241 05/26/18 2256 05/27/18 0041 05/27/18 0045   BP: (!) 175/115 (!) 153/89 (!) 177/120    BP Location: Right arm  Right arm    Patient Position: Lying  Lying    Pulse: 100 100 101 99   Resp: 19 18 18    Temp: 99.9 °F (37.7 °C)  98.6  "°F (37 °C)    TempSrc: Oral  Oral    SpO2: 96% 100% 99%    Weight:   50 kg (110 lb 3.7 oz)    Height:   5' 5" (1.651 m)           -- General appearance: well developed. appears stated age   -- Head: normocephalic, atraumatic   -- Eyes: conjunctivae clear. Extraocular muscles intact  -- Nose: Nares normal. Septum midline.   -- Mouth/Throat: lips, mucosa, and tongue normal. no throat erythema.   -- Neck: supple, symmetrical, trachea midline, no JVD and thyroid not grossly enlarged, appears symmetric  -- Lungs: Decreased breath sounds on right inferior lung field with faint inspiratory crackles. normal respiratory effort. No use of accessory muscles.   -- Chest wall: no tenderness. equal bilateral chest rise   -- Heart: Rapid rate and regular rhythm. S1, S2 normal.  no click, rub or gallop   -- Abdomen: soft, non-tender, non-distended, non-tympanic; bowel sounds normal; no masses  -- Extremities: +AV fistula in the left arm with palpable thrill.  no cyanosis, clubbing or edema.   -- Pulses: 2+ and symmetric   -- Skin: color normal, texture normal, turgor normal. No rashes or lesions.   -- Neurologic: Normal strength and tone. No focal numbness or weakness. CNII-XII intact. Pierson coma scale: eyes open spontaneously-4, oriented & converses-5, obeys commands-6.      LABORATORY STUDIES:     Recent Results (from the past 36 hour(s))   CBC auto differential    Collection Time: 05/26/18  7:25 PM   Result Value Ref Range    WBC 6.94 3.90 - 12.70 K/uL    RBC 2.42 (L) 4.00 - 5.40 M/uL    Hemoglobin 6.7 (L) 12.0 - 16.0 g/dL    Hematocrit 20.1 (L) 37.0 - 48.5 %    MCV 83 82 - 98 fL    MCH 27.7 27.0 - 31.0 pg    MCHC 33.3 32.0 - 36.0 g/dL    RDW 17.6 (H) 11.5 - 14.5 %    Platelets 54 (L) 150 - 350 K/uL    MPV 10.0 9.2 - 12.9 fL    Gran # (ANC) 5.7 1.8 - 7.7 K/uL    Lymph # 0.5 (L) 1.0 - 4.8 K/uL    Mono # 0.5 0.3 - 1.0 K/uL    Eos # 0.3 0.0 - 0.5 K/uL    Baso # 0.01 0.00 - 0.20 K/uL    Gran% 82.1 (H) 38.0 - 73.0 %    Lymph% 6.9 " (L) 18.0 - 48.0 %    Mono% 7.3 4.0 - 15.0 %    Eosinophil% 3.6 0.0 - 8.0 %    Basophil% 0.1 0.0 - 1.9 %    Differential Method Automated    Comprehensive metabolic panel    Collection Time: 05/26/18  7:25 PM   Result Value Ref Range    Sodium 136 136 - 145 mmol/L    Potassium 3.2 (L) 3.5 - 5.1 mmol/L    Chloride 98 95 - 110 mmol/L    CO2 27 23 - 29 mmol/L    Glucose 109 70 - 110 mg/dL    BUN, Bld 14 6 - 20 mg/dL    Creatinine 3.8 (H) 0.5 - 1.4 mg/dL    Calcium 8.3 (L) 8.7 - 10.5 mg/dL    Total Protein 7.5 6.0 - 8.4 g/dL    Albumin 2.7 (L) 3.5 - 5.2 g/dL    Total Bilirubin 0.9 0.1 - 1.0 mg/dL    Alkaline Phosphatase 512 (H) 55 - 135 U/L    AST 45 (H) 10 - 40 U/L    ALT 34 10 - 44 U/L    Anion Gap 11 8 - 16 mmol/L    eGFR if African American 18 (A) >60 mL/min/1.73 m^2    eGFR if non African American 15 (A) >60 mL/min/1.73 m^2   Troponin I    Collection Time: 05/26/18  7:25 PM   Result Value Ref Range    Troponin I 0.076 (H) 0.000 - 0.026 ng/mL   hCG, quantitative, pregnancy    Collection Time: 05/26/18  7:25 PM   Result Value Ref Range    hCG Quant <1.2 See Text mIU/mL   Lactic acid, plasma    Collection Time: 05/26/18  7:35 PM   Result Value Ref Range    Lactate (Lactic Acid) 0.7 0.5 - 2.2 mmol/L   Blood culture    Collection Time: 05/26/18  7:35 PM   Result Value Ref Range    Blood Culture, Routine No Growth to date    Blood culture    Collection Time: 05/26/18  7:35 PM   Result Value Ref Range    Blood Culture, Routine No Growth to date    Lactic acid, plasma    Collection Time: 05/26/18 10:50 PM   Result Value Ref Range    Lactate (Lactic Acid) 0.6 0.5 - 2.2 mmol/L   CBC auto differential    Collection Time: 05/27/18  3:34 AM   Result Value Ref Range    WBC 5.80 3.90 - 12.70 K/uL    RBC 2.46 (L) 4.00 - 5.40 M/uL    Hemoglobin 6.7 (L) 12.0 - 16.0 g/dL    Hematocrit 20.4 (L) 37.0 - 48.5 %    MCV 83 82 - 98 fL    MCH 27.2 27.0 - 31.0 pg    MCHC 32.8 32.0 - 36.0 g/dL    RDW 17.6 (H) 11.5 - 14.5 %    Platelets 50 (L)  150 - 350 K/uL    MPV SEE COMMENT 9.2 - 12.9 fL    Gran # (ANC) 4.6 1.8 - 7.7 K/uL    Lymph # 0.5 (L) 1.0 - 4.8 K/uL    Mono # 0.4 0.3 - 1.0 K/uL    Eos # 0.3 0.0 - 0.5 K/uL    Baso # 0.00 0.00 - 0.20 K/uL    Gran% 79.4 (H) 38.0 - 73.0 %    Lymph% 8.6 (L) 18.0 - 48.0 %    Mono% 6.7 4.0 - 15.0 %    Eosinophil% 5.5 0.0 - 8.0 %    Basophil% 0.0 0.0 - 1.9 %    Platelet Estimate Decreased (A)     Differential Method Automated    Comprehensive metabolic panel    Collection Time: 05/27/18  3:34 AM   Result Value Ref Range    Sodium 135 (L) 136 - 145 mmol/L    Potassium 3.5 3.5 - 5.1 mmol/L    Chloride 98 95 - 110 mmol/L    CO2 25 23 - 29 mmol/L    Glucose 106 70 - 110 mg/dL    BUN, Bld 17 6 - 20 mg/dL    Creatinine 4.6 (H) 0.5 - 1.4 mg/dL    Calcium 8.4 (L) 8.7 - 10.5 mg/dL    Total Protein 7.2 6.0 - 8.4 g/dL    Albumin 2.5 (L) 3.5 - 5.2 g/dL    Total Bilirubin 0.9 0.1 - 1.0 mg/dL    Alkaline Phosphatase 472 (H) 55 - 135 U/L    AST 50 (H) 10 - 40 U/L    ALT 37 10 - 44 U/L    Anion Gap 12 8 - 16 mmol/L    eGFR if African American 14 (A) >60 mL/min/1.73 m^2    eGFR if non African American 12 (A) >60 mL/min/1.73 m^2       Lab Results   Component Value Date    INR 1.1 02/16/2018    INR 1.1 01/18/2018    INR 1.1 01/04/2018     Lab Results   Component Value Date    HGBA1C 4.5 01/24/2018     No results for input(s): POCTGLUCOSE in the last 72 hours.        MICROBIOLOGY DATA:     Urine Culture, Routine   Date Value Ref Range Status   01/29/2018   Final    ENTEROCOCCUS FAECALIS  >100,000 cfu/ml  No other significant isolate     01/17/2018 No significant growth  Final   01/11/2018 No significant growth  Final   12/17/2017   Final    STREPTOCOCCUS AGALACTIAE (GROUP B)  > 100,000 cfu/ml  Beta-hemolytic streptococci are routinely susceptible to   penicillins,cephalosporins and carbapenems.     04/13/2016 No significant growth  Final       Microbiology x 7d:   Microbiology Results (last 7 days)     Procedure Component Value Units  Date/Time    Blood culture [[id number]] Collected:  05/26/18 1935    Order Status:  Completed Specimen:  Blood from Peripheral, Antecubital, Right Updated:  05/27/18 0354     Blood Culture, Routine No Growth to date    Narrative:       Aerobic and anaerobic    Blood culture [[id number]] Collected:  05/26/18 1935    Order Status:  Completed Specimen:  Blood from Peripheral, Antecubital, Right Updated:  05/27/18 0354     Blood Culture, Routine No Growth to date    Narrative:       Aerobic and anaerobic    Urine culture [[id number]]     Order Status:  Canceled Specimen:  Urine             IMAGING:     Imaging Results          X-Ray Chest PA And Lateral (Final result)     Abnormal  Result time 05/26/18 19:12:27    Final result by Jude Forbes MD (05/26/18 19:12:27)                 Impression:      Findings suggesting pulmonary edema/CHF pattern, with interstitial pneumonia not excluded.    Right lower lobe triangular opacity likely representing subsegmental atelectasis, with aspiration or pneumonia not entirely excluded.  Short-term follow-up chest radiography after therapy is recommended to resolution.    This report was flagged in Epic as abnormal.      Electronically signed by: Jude Forbes MD  Date:    05/26/2018  Time:    19:12             Narrative:    EXAMINATION:  XR CHEST PA AND LATERAL    CLINICAL HISTORY:  Fever, unspecified    TECHNIQUE:  PA and lateral views of the chest were performed.    COMPARISON:  Chest radiograph 05/01/2018    FINDINGS:  Cardiac silhouette is enlarged with bilateral diffuse interstitial coarsening suggesting pulmonary edema/CHF pattern.  Interstitial pneumonia not excluded.  Triangular opacity at the right lower lobe likely representing subsegmental atelectasis.  No large consolidation pleural effusion or pneumothorax seen.  Mediastinal contours and hilar regions are otherwise within normal limits.  No acute osseous process seen.                                  CONSULTS:     IP  CONSULT TO NEPHROLOGY       ASSESSMENT & PLAN:     Primary Diagnosis:  Right lower lobe pneumonia    Active Hospital Problems    Diagnosis  POA    *Right lower lobe pneumonia [J18.1]  Yes     Priority: 1 - High    Fever [R50.9]  Yes     Priority: 2     Seizures [R56.9]  Yes    Immunosuppressed [D89.9]  Yes     Chronic    ESRD (end stage renal disease) on dialysis [N18.6, Z99.2]  Not Applicable    Anemia in ESRD (end-stage renal disease) [N18.6, D63.1]  Yes     Chronic    Renovascular hypertension [I15.0]  Yes     Chronic   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 [Z94.4]  Not Applicable     Chronic     hemangioendothelioma s/p LTx (1992)        Resolved Hospital Problems    Diagnosis Date Resolved POA   No resolved problems to display.         Fever likely secondary to right lower lobe Pneumonia  · As evidence by history, physical exam, chest x-ray.  Immunocompromised state on Prograf and CellCept.  · Right lower lobe opacification consistent with pneumonia  · Initiated IV antibiotics for healthcare community-acquired pneumonia with vancomycin and cefepime in the ED.  Will hold vancomycin and continue cefepime pending blood culture and sputum culture results.  · If clinical improvement is not seen by tomorrow, broaden antibiotic coverage, further tailored by sputum Gram stain and culture results  · Robitussin  · Tessalon Perles  · Incentive spirometry, aspiration precautions  · Consider chest physiotherapy as course dictates  · Nebulizer treatments scheduled  · If clinical improvement is not obtained with the treatment above consider ordering PPD, quantiferon gold, histoplasma, blastomycosis urine antigens, aspergillus antigen, cryptococcus antigen, procalcitonin, and/or modified AFB.  · Pulmonology consult as clinical course dictates      Elevated troponin  Markedly hypertensive upon admission and also with ESRD.  Possible demand ischemia versus baseline troponinemia.  No evidence of MI on EKG.  Monitoring with  telemetry.  Aspirin given.    Chronically Immunocompromised state secondary to History of liver transplant in 1992  · Secondary to hemangioendothelioma   · On CellCept and Prograf.  Check levels.    End-stage renal disease on dialysis  · No acute issues with significant volume overload, electrolyte abnormality, or acid-base abnormality at this time  · Will need routine dialysis while inpatient  · Nephrology consulted    Hypertension  · Goal while inpatient is a systolic blood pressure less than 160mmHg  · BP is not in acceptable range at this time  · Continue current home regimen with hold parameters  · PRN antihypertensives available    Anemia in ESRD  Hemoglobin is 6.7;  concern there is hemodilution with end-stage renal disease and incomplete dialysis.  Marked Hypertension also consistent with volume overload.  Consult nephrology.  May need a blood transfusion with dialysis.  Anemia may be contributing to patient's generalized fatigue in addition to acute infection.    Seizure Disorder  · No acute issues  · Seizure restrictions are but not limited to: no driving for six months after last seizure; avoid swimming, high altitude activities, operating heavy machinery, bathing unattended, or engaging in activities in where a seizure will cause harm to self or others.  · F/u Neuro as an oupt.      VTE Risk Mitigation         Ordered     IP VTE HIGH RISK PATIENT  Once      05/27/18 0032     Place sequential compression device  Until discontinued      05/27/18 0032            Adult PRN medications available   DVT prophylaxis given       DISPOSITION:     Will admit to the Hospital Medicine service for further evaluation and treatment.    Chart reviewed and updated where applicable.    High Risk Conditions:  Patient has a condition that poses threat to life and bodily function: Sepsis secondary to presumed pneumonia      ===============================================================    Philip Sargent MD,  MPH  Department of Hospital Medicine   [facility]  [phone] pg  (7pm - 6am)          This note is dictated using Dragon Medical 360 voice recognition software.  There are word recognition mistakes that are occasionally missed on review.

## 2018-05-27 NOTE — HPI
Holly Patel is a 27 y.o. female that (in part)  has a past medical history of Anemia in ESRD (end-stage renal disease); Chronic rejection of liver transplant; Encounter for blood transfusion; ESRD on hemodialysis; History of splenomegaly; Immunosuppressed; Iron deficiency anemia secondary to inadequate dietary iron intake; Liver replaced by transplant; Moderate protein-calorie malnutrition; MRSA bacteremia; Prophylactic immunotherapy; Renovascular hypertension; Secondary hyperparathyroidism; Sialadenitis; Thrombocytopenia; and Thrombocytopenia.  has a past surgical history that includes Liver transplant (1992); Liver biopsy;  section; and Tubal ligation ().   Presents to [facility] Emergency Department complaining of fever beginning with acute onset at dialysis this morning.  Associated with generalized body aches and generalized fatigue for the last 3 days.  Occasional nonproductive cough.  Denies chest pain, abdominal pain, diarrhea, sore throat, ear pain, sinus pain, congestion, or other obvious infections or wounds.  Reports compliance with her home medication regimen.  Immunocompromised state due to liver transplant.  Denies recent travel or sick contacts, although she is a dialysis patient.    In the emergency department routine laboratory studies, chest x-ray, and cultures were obtained.  Portable x-ray was concerning for possible pneumonia which led to a PA and lateral demonstrating some equivocal evidence for right lower lobe pneumonia versus atelectasis.  Given the patient's immunocompromised state and fever, empiric antibiotics were initiated and the patient is being admitted for further evaluation and treatment.    Hospital medicine has been asked to admit for further evaluation and treatment.

## 2018-05-27 NOTE — PLAN OF CARE
Problem: Patient Care Overview  Goal: Plan of Care Review  Outcome: Ongoing (interventions implemented as appropriate)   05/27/18 0320   Coping/Psychosocial   Plan Of Care Reviewed With patient;mother   Pt AAOx4; NAD; VSS; pt afebrile with frequent episodes of high bp; IV intact without redness;  Infrequent cough noted; no c/o pain; pt remains free of falls or trauma; resp equal bilat and unlabored; care plan ongoing; will continue to monitor and assess.

## 2018-05-27 NOTE — ED PROVIDER NOTES
Encounter Date: 5/26/2018    SCRIBE #1 NOTE: I, Jeremy Mcleod, am scribing for, and in the presence of, Ebony Lea MD.       History     Chief Complaint   Patient presents with    Generalized Body Aches     body aches x 3 days.  fever since dialysis this am.  denies diarrhea or fever.       27-year-old female past medical history ESRD on TThS HD, liver transplant, hypertension presents to the emergency room with a complaint of fever.  Patient states that she was at dialysis this morning at 9 AM when they noted she had a fever and gave her Tylenol.  She was instructed to the emergency room but she has so a home to lay down.  Patient states that she then woke up this evening with fever 102 and decided to come to the emergency room.  Patient states she's having diffuse body aches.  Patient denies any cough, sore throat, abdominal pain, chest pain, vomiting or diarrhea.  Patient states that she does.  Make a small amount of urine.  Patient states that her last menstrual cycle was Apr 30.  Patient states that her AV fistula has been in place for 3 years she does not report that there is any pain or redness around the site.  Patient does not know of any sick contacts..                 Review of patient's allergies indicates:   Allergen Reactions    Chloral hydrate      Other reaction(s): Hallucinations  Other reaction(s): Hives    Hydrocodone Other (See Comments)     Mental status changes     Past Medical History:   Diagnosis Date    Anemia in ESRD (end-stage renal disease) 10/12/2015    Chronic rejection of liver transplant 3/22/2016    Encounter for blood transfusion     ESRD on hemodialysis 9/30/2015    History of splenomegaly 4/12/2016    Immunosuppressed 8/5/2017    Iron deficiency anemia secondary to inadequate dietary iron intake 8/16/2017    She receives IV iron periodically at the D[facility].   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)    Moderate  protein-calorie malnutrition 2017    MRSA bacteremia 2017    Prophylactic immunotherapy 2014    Renovascular hypertension 10/2/2015    Secondary hyperparathyroidism 2017    Sialadenitis 3/21/2018    Thrombocytopenia 2016    Thrombocytopenia 2016     Past Surgical History:   Procedure Laterality Date     SECTION      x 2    LIVER BIOPSY      LIVER TRANSPLANT  1992    TUBAL LIGATION       Family History   Problem Relation Age of Onset    Hypertension Mother     Hypertension Father     Melanoma Neg Hx     Breast cancer Neg Hx     Colon cancer Neg Hx     Ovarian cancer Neg Hx      Social History   Substance Use Topics    Smoking status: Never Smoker    Smokeless tobacco: Never Used    Alcohol use No     Review of Systems   Constitutional: Positive for chills and fever.   HENT: Negative for rhinorrhea and sore throat.    Respiratory: Negative for cough.    Cardiovascular: Negative for chest pain.   Gastrointestinal: Negative for abdominal pain, constipation, diarrhea, nausea and vomiting.   Genitourinary: Negative for dysuria.   Musculoskeletal: Positive for myalgias.   All other systems reviewed and are negative.      Physical Exam     Initial Vitals [18 1840]   BP Pulse Resp Temp SpO2   (!) 190/120 (!) 122 18 (!) 102.2 °F (39 °C) 99 %      MAP       143.33           Vitals:    18 2026 18 2107 18 2109 18 2156   BP: (!) 229/145 (!) 214/127 (!) 214/127 (!) 212/135   BP Location:  Right arm     Patient Position:  Lying     Pulse: (!) 120 (!) 116 (!) 116 (!) 114   Resp:  20     Temp:  (!) 101.1 °F (38.4 °C)     TempSrc:  Oral     SpO2: 100% 96% 97% 98%   Weight:       Height:         Physical Exam    Nursing note and vitals reviewed.  Constitutional:   Thin, conversational interactive   HENT:   Mouth/Throat: Oropharynx is clear and moist.   Eyes: EOM are normal. Pupils are equal, round, and reactive to light.   Neck: Normal range of  motion.   Cardiovascular: Intact distal pulses.   see documented heart rate and blood pressure.  Tachycardic and hypertensive, AV fistula in the left arm with palpable thrill,     Bilateral pulses 2+ and equal   Pulmonary/Chest: Breath sounds normal. No respiratory distress. She has no wheezes. She has no rhonchi. She has no rales.   Abdominal: Soft. There is no tenderness. There is no rebound and no guarding.   Musculoskeletal: She exhibits no edema.   Neurological: She is alert and oriented to person, place, and time. She has normal strength.   No obvious focal deficit   Skin: Skin is warm.   Psychiatric: She has a normal mood and affect.         ED Course   Procedures  Labs Reviewed   CBC W/ AUTO DIFFERENTIAL - Abnormal; Notable for the following:        Result Value    RBC 2.42 (*)     Hemoglobin 6.7 (*)     Hematocrit 20.1 (*)     RDW 17.6 (*)     Platelets 54 (*)     Lymph # 0.5 (*)     Gran% 82.1 (*)     Lymph% 6.9 (*)     All other components within normal limits   COMPREHENSIVE METABOLIC PANEL - Abnormal; Notable for the following:     Potassium 3.2 (*)     Creatinine 3.8 (*)     Calcium 8.3 (*)     Albumin 2.7 (*)     Alkaline Phosphatase 512 (*)     AST 45 (*)     eGFR if  18 (*)     eGFR if non  15 (*)     All other components within normal limits   TROPONIN I - Abnormal; Notable for the following:     Troponin I 0.076 (*)     All other components within normal limits   CULTURE, BLOOD   CULTURE, BLOOD   LACTIC ACID, PLASMA   HCG, QUANTITATIVE, PREGNANCY   PREGNANCY TEST, URINE RAPID   LACTIC ACID, PLASMA   LACTIC ACID, PLASMA     EKG Readings: (Independently Interpreted)   19:23:51  121 bpm  Sinus tachycardia  Rightward axis  LVH with repolarization abnormality   Non specific changes     X-Ray Chest PA And Lateral   Final Result   Abnormal      Findings suggesting pulmonary edema/CHF pattern, with interstitial pneumonia not excluded.      Right lower lobe triangular  opacity likely representing subsegmental atelectasis, with aspiration or pneumonia not entirely excluded.  Short-term follow-up chest radiography after therapy is recommended to resolution.      This report was flagged in Epic as abnormal.         Electronically signed by: Jude Forbes MD   Date:    05/26/2018   Time:    19:12             Medical Decision Making:   ED Management:  Will consult with Philip Sargent MD for admission.   On arrival to the emergency room patient found to be tachycardic and febrile.  Patient given a limited amount of IV fluids secondary to end-stage renal disease.  Patient started on empiric antibiotics with limited quantity due to risk of fluid overload.       CXR with possible infiltrate.  Straight cath attempted for urine but no urine obtained. Patient has had improvement of her blood pressure and heart rate in the emergency room.  Patient will be admitted to the hospital.            Scribe Attestation:   Scribe #1: I performed the above scribed service and the documentation accurately describes the services I performed. I attest to the accuracy of the note.    Attending Attestation:           Physician Attestation for Scribe:  Physician Attestation Statement for Scribe #1: I, Ebony Lea MD, reviewed documentation, as scribed by Jeremy Mcleod in my presence, and it is both accurate and complete.                    Clinical Impression:   The primary encounter diagnosis was Body aches. Diagnoses of Acute febrile illness, End stage renal disease, and Fever were also pertinent to this visit.    Disposition:   Disposition: Admitted                        Ebony Lea MD  05/26/18 2241       Ebony Lea MD  05/26/18 2304

## 2018-05-27 NOTE — PLAN OF CARE
05/27/18 1629   Discharge Assessment   Assessment Type Discharge Planning Assessment   Confirmed/corrected address and phone number on facesheet? Yes   Assessment information obtained from? Patient   Communicated expected length of stay with patient/caregiver no   Prior to hospitilization cognitive status: Alert/Oriented   Prior to hospitalization functional status: Independent   Current cognitive status: (sleepy, mother, Myrna here)   Current Functional Status: Independent   Lives With parent(s)   Able to Return to Prior Arrangements yes   Is patient able to care for self after discharge? Yes   Patient's perception of discharge disposition home or selfcare   Readmission Within The Last 30 Days current reason for admission unrelated to previous admission   If yes, most recent facility name: ([facility])   Patient currently being followed by outpatient case management? No   Patient currently receives home health services? Yes   Patient currently receives any other outside agency services? Yes   Name and contact number of agency or person providing outside services (Patient says she had [facility].  [employer] [facility])   Is it the patient/care giver preference to resume care with the current outside agency? Yes   Equipment Currently Used at Home none   Do you have any problems affording any of your prescribed medications? No   Is the patient taking medications as prescribed? yes   Does the patient have transportation home? Yes   Transportation Available car;family or friend will provide   Does the patient receive services at the [facility]? No   Discharge Plan A Home with family;Home Health   Discharge Plan B Home with family   Patient/Family In Agreement With Plan yes   Arrived From admitted as an inpatient   How many people do you have in your home that can help with your care after discharge? 1   Does the patient currently use HME? No   Patient currently receives private duty nursing? No    Do you have any financial concerns preventing you from receiving the healthcare you need? No   On Dialysis? Yes   Does the patient receive outpatient dialysis? Yes   Are there any open cases? No   Readmission Questionnaire   At the time of your discharge, did someone talk to you about what your health problems were? Yes   At the time of discharge, did someone talk to you about what to watch out for regarding worsening of your health problem? Yes   At the time of discharge, did someone talk to you about what to do if you experienced worsening of your health problem? Yes   At the time of discharge, did someone talk to you about which medication to take when you left the hospital and which ones to stop taking? Yes   At the time of discharge, did someone talk to you about when and where to follow up with a doctor after you left the hospital? Yes   What do you believe caused you to be sick enough to be re-admitted? (had fever)   How often do you need to have someone help you when you read instructions, pamphlets, or other written material from your doctor or pharmacy? Rarely   Do you have problems taking your medications as prescribed? No   Do you have any problems affording any of  your prescribed medications? No   Do you have problems obtaining/receiving your medications? No   Does the patient have transportation to healthcare appointments? Yes   Living Arrangements house   Does the patient have family/friends to help with healtcare needs after discharge? yes   Does your caregiver provide all the help you need? Yes   Are you currently feeling confused? No   Are you currently having problems thinking? No   Are you currently having memory problems? No   Have you felt down, depressed, or hopeless? 2   Have you felt little interest or pleasure in doing things? 1   In the last 7 days, my sleep quality was: fair   Patient sleepy. Mother and caregiver, Myrna here at bedside.  Patient has psychiatry appt schedyuled for  Wednesday 5/30/2018.    She goes to [facility] in [place] on [address]. TTS.

## 2018-05-27 NOTE — NURSING
Dr. Sargent at bedside  And notified of HTN. Will obtain vital signs every 2 hours until more stable

## 2018-05-27 NOTE — CONSULTS
REASON FOR CONSULTATION:  End-stage renal disease, hemodialysis.    HISTORY OF PRESENT ILLNESS:  The patient is a 27-year-old -American lady   with past medical history significant for hypertension, liver transplant status   and end-stage renal disease, on maintenance hemodialysis, who presented to the   hospital yesterday with complaint of having body ache and fever.  The patient   went to hemodialysis yesterday and started having fever while on dialysis and   the patient finished her dialysis and was sent here to the Emergency Room.  The   patient was diagnosed with right lower lobe pneumonia and the patient was   admitted to the hospital and we were consulted to see the patient for evaluation   of end-stage renal disease and hemodialysis.  At this time, the patient reports   feeling okay.  Denied any shortness of breath, chest pain, nausea or vomiting.    PAST MEDICAL HISTORY:  As above.    MEDICATIONS:  The patient is currently receiving albuterol and Atrovent   nebulizer treatment q. 6 hours, aspirin 81 mg p.o. daily, Sensipar 30 mg p.o.   daily, citalopram 20 mg p.o. daily, clonidine TTS 2 q. weekly, Pepcid 20 mg p.o.   daily, hydralazine 50 mg p.o. q. 8 hours, labetalol 400 mg p.o. q. 8 hours,   lacosamide 50 mg p.o. q. 12 hours, Keppra 500 mg p.o. b.i.d., CellCept 250 mg   p.o. b.i.d., nifedipine 90 mg p.o. daily, prednisone 1 mg p.o. every other day   and Prograf 7 mg p.o. daily.    FAMILY HISTORY:  Noncontributory.    SOCIAL HISTORY:  The patient denied any recent history of tobacco use, alcohol   abuse or IV drug use.    PHYSICAL EXAMINATION:  GENERAL:  The patient is awake, alert and in no apparent distress.  VITAL SIGNS:  Temperature 98.5 with a blood pressure of 168/114 with a pulse of   89, respirations of 17.  HEENT:  Pupils equal, round and reactive to light.  EOMI.  Oral mucosa is moist.    Oropharynx clear.  HEART:  Regular rhythm and rate.  LUNGS:  Decreased breath sounds bilaterally with  basilar crackle on the right   side.  ABDOMEN:  Soft, positive bowel sounds, nondistended, nontender.  EXTREMITIES:  Without any edema.    LABORATORY DATA:  WBC 5.7, hemoglobin 6.7, hematocrit 20.4 and platelet count   850.  Sodium 135, potassium 3.5, chloride 98, CO2 of 25, BUN 17, creatinine 4.6   and glucose 106.    IMPRESSION:  1.  End-stage renal disease.  2.  Pneumonia.  3.  Acute febrile illness most likely related to pneumonia.  4.  Liver transplant status.  5.  Hypertension.  6.  Anemia of chronic kidney disease.    DISCUSSION AND RECOMMENDATION:  At this time, the patient had no symptom of   overly fluid overload.  The patient's electrolytes look okay.  The patient   received full hemodialysis yesterday.  We will dialyze the patient in the   morning and we will transfuse the patient if needed.  We will continue to follow   the patient for maintenance hemodialysis.    Thank you for the courtesy of the consultation and allowing me to participate in   the patient's care.      ALIE/IN  dd: 05/27/2018 08:46:07 (CDT)  td: 05/27/2018 12:40:53 (CDT)  Doc ID   #[id number]  Job ID #[id number]    CC:

## 2018-05-27 NOTE — CONSULTS
Dictation #1  MRN:[id number]  CSN:[id number]    Consult dictated # [id number]    HD in am  Continue present Rx  We'll follow for dialysis  Thanks

## 2018-05-28 PROBLEM — R78.81 BACTEREMIA: Status: ACTIVE | Noted: 2018-05-28

## 2018-05-28 LAB
ALBUMIN SERPL BCP-MCNC: 2.4 G/DL
ALP SERPL-CCNC: 419 U/L
ALT SERPL W/O P-5'-P-CCNC: 30 U/L
ANION GAP SERPL CALC-SCNC: 11 MMOL/L
AST SERPL-CCNC: 33 U/L
BASOPHILS # BLD AUTO: 0.01 K/UL
BASOPHILS NFR BLD: 0.1 %
BILIRUB SERPL-MCNC: 0.6 MG/DL
BUN SERPL-MCNC: 31 MG/DL
CALCIUM SERPL-MCNC: 7.7 MG/DL
CHLORIDE SERPL-SCNC: 97 MMOL/L
CO2 SERPL-SCNC: 25 MMOL/L
CREAT SERPL-MCNC: 6.4 MG/DL
DIFFERENTIAL METHOD: ABNORMAL
EOSINOPHIL # BLD AUTO: 0.3 K/UL
EOSINOPHIL NFR BLD: 3.7 %
ERYTHROCYTE [DISTWIDTH] IN BLOOD BY AUTOMATED COUNT: 17.1 %
EST. GFR  (AFRICAN AMERICAN): 9 ML/MIN/1.73 M^2
EST. GFR  (NON AFRICAN AMERICAN): 8 ML/MIN/1.73 M^2
GLUCOSE SERPL-MCNC: 99 MG/DL
HCT VFR BLD AUTO: 22.1 %
HGB BLD-MCNC: 7.3 G/DL
LYMPHOCYTES # BLD AUTO: 0.6 K/UL
LYMPHOCYTES NFR BLD: 8.8 %
MCH RBC QN AUTO: 27.1 PG
MCHC RBC AUTO-ENTMCNC: 33 G/DL
MCV RBC AUTO: 82 FL
MONOCYTES # BLD AUTO: 0.4 K/UL
MONOCYTES NFR BLD: 4.8 %
NEUTROPHILS # BLD AUTO: 6 K/UL
NEUTROPHILS NFR BLD: 82.5 %
PLATELET # BLD AUTO: 60 K/UL
PMV BLD AUTO: 9.9 FL
POTASSIUM SERPL-SCNC: 3.7 MMOL/L
PROT SERPL-MCNC: 6.9 G/DL
RBC # BLD AUTO: 2.69 M/UL
SODIUM SERPL-SCNC: 133 MMOL/L
WBC # BLD AUTO: 7.31 K/UL

## 2018-05-28 PROCEDURE — 90935 HEMODIALYSIS ONE EVALUATION: CPT

## 2018-05-28 PROCEDURE — 27000221 HC OXYGEN, UP TO 24 HOURS

## 2018-05-28 PROCEDURE — 25000003 PHARM REV CODE 250: Performed by: EMERGENCY MEDICINE

## 2018-05-28 PROCEDURE — 94640 AIRWAY INHALATION TREATMENT: CPT

## 2018-05-28 PROCEDURE — 25000003 PHARM REV CODE 250: Performed by: HOSPITALIST

## 2018-05-28 PROCEDURE — 80053 COMPREHEN METABOLIC PANEL: CPT

## 2018-05-28 PROCEDURE — 63600175 PHARM REV CODE 636 W HCPCS: Performed by: INTERNAL MEDICINE

## 2018-05-28 PROCEDURE — 11000001 HC ACUTE MED/SURG PRIVATE ROOM

## 2018-05-28 PROCEDURE — 87040 BLOOD CULTURE FOR BACTERIA: CPT

## 2018-05-28 PROCEDURE — 94761 N-INVAS EAR/PLS OXIMETRY MLT: CPT

## 2018-05-28 PROCEDURE — 63600175 PHARM REV CODE 636 W HCPCS: Performed by: EMERGENCY MEDICINE

## 2018-05-28 PROCEDURE — 25000242 PHARM REV CODE 250 ALT 637 W/ HCPCS: Performed by: HOSPITALIST

## 2018-05-28 PROCEDURE — 36415 COLL VENOUS BLD VENIPUNCTURE: CPT

## 2018-05-28 PROCEDURE — 85025 COMPLETE CBC W/AUTO DIFF WBC: CPT

## 2018-05-28 PROCEDURE — 63600175 PHARM REV CODE 636 W HCPCS: Performed by: HOSPITALIST

## 2018-05-28 RX ADMIN — ERYTHROPOIETIN 10000 UNITS: 10000 INJECTION, SOLUTION INTRAVENOUS; SUBCUTANEOUS at 11:05

## 2018-05-28 RX ADMIN — LABETALOL HYDROCHLORIDE 400 MG: 100 TABLET, FILM COATED ORAL at 11:05

## 2018-05-28 RX ADMIN — HYDRALAZINE HYDROCHLORIDE 50 MG: 25 TABLET ORAL at 11:05

## 2018-05-28 RX ADMIN — MYCOPHENOLATE MOFETIL 250 MG: 250 CAPSULE ORAL at 11:05

## 2018-05-28 RX ADMIN — MORPHINE SULFATE 4 MG: 10 INJECTION INTRAVENOUS at 01:05

## 2018-05-28 RX ADMIN — MYCOPHENOLATE MOFETIL 250 MG: 250 CAPSULE ORAL at 08:05

## 2018-05-28 RX ADMIN — LEVETIRACETAM 500 MG: 500 TABLET, FILM COATED ORAL at 11:05

## 2018-05-28 RX ADMIN — GUAIFENESIN AND DEXTROMETHORPHAN 10 ML: 100; 10 SYRUP ORAL at 12:05

## 2018-05-28 RX ADMIN — HYDRALAZINE HYDROCHLORIDE 50 MG: 25 TABLET ORAL at 01:05

## 2018-05-28 RX ADMIN — GUAIFENESIN AND DEXTROMETHORPHAN 10 ML: 100; 10 SYRUP ORAL at 06:05

## 2018-05-28 RX ADMIN — IPRATROPIUM BROMIDE AND ALBUTEROL SULFATE 3 ML: .5; 2.5 SOLUTION RESPIRATORY (INHALATION) at 01:05

## 2018-05-28 RX ADMIN — FAMOTIDINE 20 MG: 20 TABLET ORAL at 08:05

## 2018-05-28 RX ADMIN — CITALOPRAM HYDROBROMIDE 20 MG: 20 TABLET ORAL at 08:05

## 2018-05-28 RX ADMIN — LABETALOL HYDROCHLORIDE 400 MG: 100 TABLET, FILM COATED ORAL at 06:05

## 2018-05-28 RX ADMIN — HYDRALAZINE HYDROCHLORIDE 50 MG: 25 TABLET ORAL at 06:05

## 2018-05-28 RX ADMIN — GUAIFENESIN AND DEXTROMETHORPHAN 10 ML: 100; 10 SYRUP ORAL at 11:05

## 2018-05-28 RX ADMIN — IPRATROPIUM BROMIDE AND ALBUTEROL SULFATE 3 ML: .5; 2.5 SOLUTION RESPIRATORY (INHALATION) at 07:05

## 2018-05-28 RX ADMIN — LEVETIRACETAM 500 MG: 500 TABLET, FILM COATED ORAL at 08:05

## 2018-05-28 RX ADMIN — LACOSAMIDE 50 MG: 50 TABLET, FILM COATED ORAL at 11:05

## 2018-05-28 RX ADMIN — ASPIRIN 81 MG CHEWABLE TABLET 81 MG: 81 TABLET CHEWABLE at 08:05

## 2018-05-28 RX ADMIN — GUAIFENESIN AND DEXTROMETHORPHAN 10 ML: 100; 10 SYRUP ORAL at 05:05

## 2018-05-28 RX ADMIN — LABETALOL HYDROCHLORIDE 400 MG: 100 TABLET, FILM COATED ORAL at 01:05

## 2018-05-28 RX ADMIN — MORPHINE SULFATE 2 MG: 10 INJECTION INTRAVENOUS at 11:05

## 2018-05-28 RX ADMIN — TACROLIMUS 7 MG: 1 CAPSULE ORAL at 08:05

## 2018-05-28 RX ADMIN — LACOSAMIDE 50 MG: 50 TABLET, FILM COATED ORAL at 08:05

## 2018-05-28 RX ADMIN — CEFEPIME 1 G: 1 INJECTION, POWDER, FOR SOLUTION INTRAMUSCULAR; INTRAVENOUS at 11:05

## 2018-05-28 RX ADMIN — IPRATROPIUM BROMIDE AND ALBUTEROL SULFATE 3 ML: .5; 2.5 SOLUTION RESPIRATORY (INHALATION) at 08:05

## 2018-05-28 NOTE — PLAN OF CARE
Problem: Hemodialysis (Adult)  Goal: Signs and Symptoms of Listed Potential Problems Will be Absent, Minimized or Managed (Hemodialysis)  Signs and symptoms of listed potential problems will be absent, minimized or managed by discharge/transition of care (reference Hemodialysis (Adult) CPG).   Outcome: Ongoing (interventions implemented as appropriate)   05/28/18 1102   Hemodialysis   Problems Assessed (Hemodialysis) all   Problems Present (Hemodialysis) cardiovascular complications;electrolyte imbalance;fluid imbalance;hematologic complications;infection;situational response       Comments: 3 hour hd tx in progress as ordered.

## 2018-05-28 NOTE — PROGRESS NOTES
Presented to the dialysis [facility] Room for intermittent hemodialysis. Pt assessed, orders/labs reviewed, consent obtained/signed. Left UE AVF accessed according to P&P, all connections attached and secured. 3 hour hd tx initiated, will continue to monitor for changes and trends. Planned UF is 2-3 L as tolerated.

## 2018-05-28 NOTE — PLAN OF CARE
"   05/28/18 1004   Discharge Reassessment   Assessment Type Discharge Planning Reassessment   Do you have any problems affording any of your prescribed medications? No   Discharge Plan A Home with family;Home Health  (Had [facility] in the Past)   Discharge Plan B (Cultures pending. Came in for Sepsis (bacteremia) )   Can the patient answer the patient profile reliably? Yes, cognitively intact   How does the patient rate their overall health at the present time? Good   Describe the patient's ability to walk at the present time. No restrictions   How often would a person be available to care for the patient? Whenever needed   Number of comorbid conditions (as recorded on the chart) Three     SW met with pt's family at bedside. SW explained her role in Care Management. Pt voiced understanding. OFELIA inquired about HELP AT HOME. Myrna stated that she will have herself (mother) at home to help for support. OFELIA voiced understanding. OFELIA inquired about responsibilities when it comes to  MANAGING HER/HIS HEALTH at home and what it entails. Myrna inquired about details. OFELIA informed pt of RESPONSIBILITIES of:    1. Follow up appointments  2. Getting Prescriptions filled  3. Taking medications as prescribed.     Myrna voiced understanding.  OFELIA explained "My Health Packet" blue folder and the pink and green tabs that are on the folder as well. Discharge Brochure given to pt with Care Team information.  Myrna voiced understanding.     Pt's pharmacy:   [id number] - [place] - [address]  [address]  [place] [id number]  Phone: [phone] Fax: [phone]    Pt's preference for appointments: MWF only.         "

## 2018-05-28 NOTE — PROGRESS NOTES
Holly Patel is a 27 y.o. female patient.    Follow for ESRD, dialysis    Patient seen while on dialysis  No new c/o, feeling better    Scheduled Meds:   albuterol-ipratropium  3 mL Nebulization Q6H    aspirin  81 mg Oral Daily    ceFEPime (MAXIPIME) IVPB  1 g Intravenous Q24H    cinacalcet  30 mg Oral Daily with breakfast    citalopram  20 mg Oral Daily    cloNIDine 0.2 mg/24 hr td ptwk  1 patch Transdermal Q7 Days    dextromethorphan-guaifenesin  mg/5 ml  10 mL Oral Q6H    epoetin anca  10,000 Units Intravenous Every Mon, Wed, Fri    famotidine  20 mg Oral Daily    hydrALAZINE  50 mg Oral Q8H    labetalol  400 mg Oral Q8H    lacosamide  50 mg Oral Q12H    levETIRAcetam  500 mg Oral BID    mycophenolate  250 mg Oral BID    NIFEdipine  90 mg Oral Daily    predniSONE  1 mg Oral Every other day    tacrolimus  7 mg Oral Daily       Review of patient's allergies indicates:   Allergen Reactions    Chloral hydrate      Other reaction(s): Hallucinations  Other reaction(s): Hives    Hydrocodone Other (See Comments)     Mental status changes         Vital Signs Range (Last 24H):  Temp:  [97.5 °F (36.4 °C)-97.9 °F (36.6 °C)]   Pulse:  [75-98]   Resp:  [14-19]   BP: (109-168)/()   SpO2:  [95 %-100 %]     I & O (Last 24H):  Intake/Output Summary (Last 24 hours) at 05/28/18 1052  Last data filed at 05/27/18 2104   Gross per 24 hour   Intake              290 ml   Output                0 ml   Net              290 ml           Physical Exam:  General appearance: well developed, no distress  Lungs:  caorse breath sounds (B)  Heart: regular rate and rhythm  Abdomen: soft, non-tender non-distented; bowel sounds normal; no masses,  no organomegaly  Extremities: edema trace    Laboratory:  CBC:   Recent Labs  Lab 05/28/18  0353   WBC 7.31   RBC 2.69*   HGB 7.3*   HCT 22.1*   PLT 60*   MCV 82   MCH 27.1   MCHC 33.0     CMP:   Recent Labs  Lab 05/28/18  0353   GLU 99   CALCIUM 7.7*   ALBUMIN  2.4*   PROT 6.9   *   K 3.7   CO2 25   CL 97   BUN 31*   CREATININE 6.4*   ALKPHOS 419*   ALT 30   AST 33   BILITOT 0.6     Microbiology Results (last 7 days)     Procedure Component Value Units Date/Time    Blood culture [[id number]] Collected:  05/26/18 1935    Order Status:  Completed Specimen:  Blood from Peripheral, Antecubital, Right Updated:  05/28/18 0636     Blood Culture, Routine Gram stain aer bottle: Gram positive cocci in clusters resembling Staph      Blood Culture, Routine Gram stain sachin bottle: Gram positive cocci in clusters resembling Staph      Blood Culture, Routine Results called to and read back by:Od[place] Tracy west 05/27/2018       Blood Culture, Routine 10:23     Blood Culture, Routine --     STAPHYLOCOCCUS AUREUS  Susceptibility pending      Narrative:       Aerobic and anaerobic    Blood culture [[id number]] Collected:  05/26/18 1935    Order Status:  Completed Specimen:  Blood from Peripheral, Antecubital, Right Updated:  05/28/18 0636     Blood Culture, Routine Gram stain sachin bottle: Gram positive cocci in clusters resembling Staph      Blood Culture, Routine Results called to and read back by:Odessa TracyAmarillo 05/27/2018       Blood Culture, Routine 10:24     Blood Culture, Routine Gram stain aer bottle: Gram positive cocci in clusters resembling Staph      Blood Culture, Routine Positive results previously called     Blood Culture, Routine --     STAPHYLOCOCCUS AUREUS  Susceptibility pending      Narrative:       Aerobic and anaerobic    Blood culture [[id number]] Collected:  05/28/18 0353    Order Status:  Sent Specimen:  Blood Updated:  05/28/18 0353    Blood culture [[id number]] Collected:  05/28/18 0353    Order Status:  Sent Specimen:  Blood Updated:  05/28/18 0353    Urine culture [[id number]]     Order Status:  Canceled Specimen:  Urine           Imp/Plan    ESRD - on dialysis  Pneumonia  Staph bacteremia  Liver transplant status  HTN  Anemia of CKD    Continue present  Rx  UF as tolerated  HD qTTS      Trac T Lenore  5/28/2018

## 2018-05-28 NOTE — PLAN OF CARE
Problem: Patient Care Overview  Goal: Plan of Care Review  Outcome: Ongoing (interventions implemented as appropriate)  Pt free of accidental injury during shift. NO s/s of distress noted. C/o of back pain. Medicated as prescribed. Pt dialyzed during shift. 2L removed. VSS stable. Fistula noted to Left forearm. Positive bruit and thrill. Tolerating diet well. Remain on 2L nasal cannula. Safety measures maintained. Call bell within reach. Will continue to monitor

## 2018-05-28 NOTE — PLAN OF CARE
Problem: Patient Care Overview  Goal: Plan of Care Review  Patient's blood pressure and lethargy has improved since admission. Patient slept well throughout the night and has been fever free. No acute distress noted at this time.

## 2018-05-29 LAB
ALBUMIN SERPL BCP-MCNC: 2.2 G/DL
ALP SERPL-CCNC: 392 U/L
ALT SERPL W/O P-5'-P-CCNC: 25 U/L
ANION GAP SERPL CALC-SCNC: 10 MMOL/L
AST SERPL-CCNC: 27 U/L
BACTERIA BLD CULT: NORMAL
BASOPHILS # BLD AUTO: 0.02 K/UL
BASOPHILS NFR BLD: 0.3 %
BILIRUB SERPL-MCNC: 0.5 MG/DL
BUN SERPL-MCNC: 19 MG/DL
CALCIUM SERPL-MCNC: 8 MG/DL
CHLORIDE SERPL-SCNC: 103 MMOL/L
CO2 SERPL-SCNC: 22 MMOL/L
CREAT SERPL-MCNC: 4.5 MG/DL
DIFFERENTIAL METHOD: ABNORMAL
EOSINOPHIL # BLD AUTO: 0.5 K/UL
EOSINOPHIL NFR BLD: 8.1 %
ERYTHROCYTE [DISTWIDTH] IN BLOOD BY AUTOMATED COUNT: 17.3 %
EST. GFR  (AFRICAN AMERICAN): 14 ML/MIN/1.73 M^2
EST. GFR  (NON AFRICAN AMERICAN): 13 ML/MIN/1.73 M^2
GLUCOSE SERPL-MCNC: 90 MG/DL
HCT VFR BLD AUTO: 22.1 %
HGB BLD-MCNC: 7.3 G/DL
LYMPHOCYTES # BLD AUTO: 0.9 K/UL
LYMPHOCYTES NFR BLD: 14.4 %
MCH RBC QN AUTO: 27.1 PG
MCHC RBC AUTO-ENTMCNC: 33 G/DL
MCV RBC AUTO: 82 FL
MONOCYTES # BLD AUTO: 0.6 K/UL
MONOCYTES NFR BLD: 9.1 %
NEUTROPHILS # BLD AUTO: 4.3 K/UL
NEUTROPHILS NFR BLD: 67.3 %
PLATELET # BLD AUTO: 82 K/UL
PMV BLD AUTO: 10.3 FL
POTASSIUM SERPL-SCNC: 4.1 MMOL/L
PROT SERPL-MCNC: 6.5 G/DL
RBC # BLD AUTO: 2.69 M/UL
SODIUM SERPL-SCNC: 135 MMOL/L
TACROLIMUS BLD-MCNC: 1.6 NG/ML
WBC # BLD AUTO: 6.4 K/UL

## 2018-05-29 PROCEDURE — 63600175 PHARM REV CODE 636 W HCPCS: Performed by: EMERGENCY MEDICINE

## 2018-05-29 PROCEDURE — 36415 COLL VENOUS BLD VENIPUNCTURE: CPT

## 2018-05-29 PROCEDURE — 25000003 PHARM REV CODE 250: Performed by: HOSPITALIST

## 2018-05-29 PROCEDURE — 94640 AIRWAY INHALATION TREATMENT: CPT

## 2018-05-29 PROCEDURE — 94799 UNLISTED PULMONARY SVC/PX: CPT

## 2018-05-29 PROCEDURE — 80100016 HC MAINTENANCE HEMODIALYSIS

## 2018-05-29 PROCEDURE — 63600175 PHARM REV CODE 636 W HCPCS: Performed by: HOSPITALIST

## 2018-05-29 PROCEDURE — 25000242 PHARM REV CODE 250 ALT 637 W/ HCPCS: Performed by: HOSPITALIST

## 2018-05-29 PROCEDURE — 11000001 HC ACUTE MED/SURG PRIVATE ROOM

## 2018-05-29 PROCEDURE — 80197 ASSAY OF TACROLIMUS: CPT

## 2018-05-29 PROCEDURE — 94761 N-INVAS EAR/PLS OXIMETRY MLT: CPT

## 2018-05-29 PROCEDURE — 25000003 PHARM REV CODE 250: Performed by: EMERGENCY MEDICINE

## 2018-05-29 PROCEDURE — 99900035 HC TECH TIME PER 15 MIN (STAT)

## 2018-05-29 PROCEDURE — 85025 COMPLETE CBC W/AUTO DIFF WBC: CPT

## 2018-05-29 PROCEDURE — 80053 COMPREHEN METABOLIC PANEL: CPT

## 2018-05-29 RX ADMIN — LEVETIRACETAM 500 MG: 500 TABLET, FILM COATED ORAL at 09:05

## 2018-05-29 RX ADMIN — ACETAMINOPHEN 650 MG: 325 TABLET, FILM COATED ORAL at 03:05

## 2018-05-29 RX ADMIN — ACETAMINOPHEN 650 MG: 325 TABLET, FILM COATED ORAL at 09:05

## 2018-05-29 RX ADMIN — GUAIFENESIN AND DEXTROMETHORPHAN 10 ML: 100; 10 SYRUP ORAL at 06:05

## 2018-05-29 RX ADMIN — TACROLIMUS 7 MG: 1 CAPSULE ORAL at 02:05

## 2018-05-29 RX ADMIN — LEVETIRACETAM 500 MG: 500 TABLET, FILM COATED ORAL at 02:05

## 2018-05-29 RX ADMIN — CEFEPIME 1 G: 1 INJECTION, POWDER, FOR SOLUTION INTRAMUSCULAR; INTRAVENOUS at 09:05

## 2018-05-29 RX ADMIN — PREDNISONE 1 MG: 1 TABLET ORAL at 02:05

## 2018-05-29 RX ADMIN — HYDRALAZINE HYDROCHLORIDE 50 MG: 25 TABLET ORAL at 09:05

## 2018-05-29 RX ADMIN — LACOSAMIDE 50 MG: 50 TABLET, FILM COATED ORAL at 02:05

## 2018-05-29 RX ADMIN — Medication 500 MG: at 02:05

## 2018-05-29 RX ADMIN — LABETALOL HYDROCHLORIDE 400 MG: 100 TABLET, FILM COATED ORAL at 09:05

## 2018-05-29 RX ADMIN — IPRATROPIUM BROMIDE AND ALBUTEROL SULFATE 3 ML: .5; 2.5 SOLUTION RESPIRATORY (INHALATION) at 08:05

## 2018-05-29 RX ADMIN — ASPIRIN 81 MG CHEWABLE TABLET 81 MG: 81 TABLET CHEWABLE at 02:05

## 2018-05-29 RX ADMIN — IPRATROPIUM BROMIDE AND ALBUTEROL SULFATE 3 ML: .5; 2.5 SOLUTION RESPIRATORY (INHALATION) at 07:05

## 2018-05-29 RX ADMIN — IPRATROPIUM BROMIDE AND ALBUTEROL SULFATE 3 ML: .5; 2.5 SOLUTION RESPIRATORY (INHALATION) at 01:05

## 2018-05-29 RX ADMIN — MYCOPHENOLATE MOFETIL 250 MG: 250 CAPSULE ORAL at 02:05

## 2018-05-29 RX ADMIN — MYCOPHENOLATE MOFETIL 250 MG: 250 CAPSULE ORAL at 09:05

## 2018-05-29 RX ADMIN — HYDRALAZINE HYDROCHLORIDE 50 MG: 25 TABLET ORAL at 02:05

## 2018-05-29 RX ADMIN — CITALOPRAM HYDROBROMIDE 20 MG: 20 TABLET ORAL at 02:05

## 2018-05-29 RX ADMIN — LABETALOL HYDROCHLORIDE 400 MG: 100 TABLET, FILM COATED ORAL at 06:05

## 2018-05-29 RX ADMIN — FAMOTIDINE 20 MG: 20 TABLET ORAL at 02:05

## 2018-05-29 RX ADMIN — HYDRALAZINE HYDROCHLORIDE 50 MG: 25 TABLET ORAL at 06:05

## 2018-05-29 RX ADMIN — NIFEDIPINE 90 MG: 30 TABLET, FILM COATED, EXTENDED RELEASE ORAL at 02:05

## 2018-05-29 RX ADMIN — IPRATROPIUM BROMIDE AND ALBUTEROL SULFATE 3 ML: .5; 2.5 SOLUTION RESPIRATORY (INHALATION) at 12:05

## 2018-05-29 RX ADMIN — LACOSAMIDE 50 MG: 50 TABLET, FILM COATED ORAL at 09:05

## 2018-05-29 NOTE — ASSESSMENT & PLAN NOTE
Pt met criteria for sepsis with fever, tachycardia due to pneumonia and bacteremia in immunocompromised host  CXR with R sided infiltrate  Blood cultures with Staph aureus in 3 out of 4 bottles  Continue empiric Cefepime and Vancomycin  Continue to hold Cellcept for now  Continue tacrolimus and prednisone, and check tacrolimus level in am  Repeat blood culture today to assess for clearance  If repeat blood cultures return positive, will need to check ECHO

## 2018-05-29 NOTE — ASSESSMENT & PLAN NOTE
CXR with R sided infiltrate in immunocompromised patient  Continue cefepime and Vancomycin   As discussed above

## 2018-05-29 NOTE — ASSESSMENT & PLAN NOTE
Pt met criteria for sepsis with fever, tachycardia due to pneumonia and bacteremia in immunocompromised host  CXR with R sided infiltrate  Blood cultures with Staph aureus in 3 out of 4 bottles in 5/26  Continue empiric Cefepime and Vancomycin  Continue to hold Cellcept for now  Continue tacrolimus and prednisone, and check tacrolimus level in am  Repeat blood cultures 5/28 are NGTD- continue to follow  If repeat blood cultures return positive, will need to check ECHO

## 2018-05-29 NOTE — PLAN OF CARE
Problem: Hemodialysis (Adult)  Goal: Signs and Symptoms of Listed Potential Problems Will be Absent, Minimized or Managed (Hemodialysis)  Signs and symptoms of listed potential problems will be absent, minimized or managed by discharge/transition of care (reference Hemodialysis (Adult) CPG).   Outcome: Ongoing (interventions implemented as appropriate)  Hemodialysis x 3 hours with 1.5L volume removal.

## 2018-05-29 NOTE — PROGRESS NOTES
[facility] Hospital Medicine  Progress Note    Patient Name: Holly Patel  MRN: [id number]  Patient Class: IP- Inpatient   Admission Date: 2018  Length of Stay: 3 days  Attending Physician: Janeen Breen MD  Primary Care Provider: Stan Sosa MD        Subjective:     Principal Problem:Sepsis    HPI:  Holly Patel is a 27 y.o. female that (in part)  has a past medical history of Anemia in ESRD (end-stage renal disease); Chronic rejection of liver transplant; Encounter for blood transfusion; ESRD on hemodialysis; History of splenomegaly; Immunosuppressed; Iron deficiency anemia secondary to inadequate dietary iron intake; Liver replaced by transplant; Moderate protein-calorie malnutrition; MRSA bacteremia; Prophylactic immunotherapy; Renovascular hypertension; Secondary hyperparathyroidism; Sialadenitis; Thrombocytopenia; and Thrombocytopenia.  has a past surgical history that includes Liver transplant (1992); Liver biopsy;  section; and Tubal ligation ().   Presents to [facility] Emergency Department complaining of fever beginning with acute onset at dialysis this morning.  Associated with generalized body aches and generalized fatigue for the last 3 days.  Occasional nonproductive cough.  Denies chest pain, abdominal pain, diarrhea, sore throat, ear pain, sinus pain, congestion, or other obvious infections or wounds.  Reports compliance with her home medication regimen.  Immunocompromised state due to liver transplant.  Denies recent travel or sick contacts, although she is a dialysis patient.    In the emergency department routine laboratory studies, chest x-ray, and cultures were obtained.  Portable x-ray was concerning for possible pneumonia which led to a PA and lateral demonstrating some equivocal evidence for right lower lobe pneumonia versus atelectasis.  Given the patient's immunocompromised state and fever, empiric  antibiotics were initiated and the patient is being admitted for further evaluation and treatment.    Hospital medicine has been asked to admit for further evaluation and treatment.       Hospital Course:  Ms. Patel was admitted with sepsis due to pneumonia and bacteremia in an immunocompromised patient. She was empirically treated with Cefepime and Vancomycin, and blood cultures were done. CXR was consistent with right infiltrate. Blood culture was remarkable for G+ cocci in clusters in 3 out of 4 bottles on 5/26. Nephrology was consulted for continued dialysis during hospital stay. Blood cultures 5/28 NGTD.     Interval History: Seen during dialysis. Sleeping, awakens easily. No complaints.     Review of Systems   Constitutional: Negative for chills and fever.   Respiratory: Negative for shortness of breath.    Cardiovascular: Negative for chest pain.   Gastrointestinal: Negative for abdominal pain.     Objective:     Vital Signs (Most Recent):  Temp: 97.7 °F (36.5 °C) (05/29/18 1245)  Pulse: 82 (05/29/18 1325)  Resp: 18 (05/29/18 1325)  BP: 119/79 (05/29/18 1245)  SpO2: 99 % (05/29/18 1325) Vital Signs (24h Range):  Temp:  [97.7 °F (36.5 °C)-98.4 °F (36.9 °C)] 97.7 °F (36.5 °C)  Pulse:  [58-84] 82  Resp:  [16-18] 18  SpO2:  [96 %-100 %] 99 %  BP: ()/(58-91) 119/79     Weight: 50 kg (110 lb 3.7 oz)  Body mass index is 18.34 kg/m².    Intake/Output Summary (Last 24 hours) at 05/29/18 1419  Last data filed at 05/29/18 1230   Gross per 24 hour   Intake              860 ml   Output             2000 ml   Net            -1140 ml      Physical Exam   Constitutional: No distress.   Thin woman   HENT:   Head: Normocephalic and atraumatic.   Nose: Nose normal.   Mouth/Throat: Oropharynx is clear and moist.   Cardiovascular: Normal rate, regular rhythm and intact distal pulses.  Exam reveals no gallop and no friction rub.    Murmur (systolic 2/6) heard.  Pulmonary/Chest: Effort normal and breath sounds normal. No  respiratory distress. She has no wheezes. She has no rales.   Abdominal: Soft. Bowel sounds are normal. She exhibits no distension and no mass. There is no tenderness. There is no guarding.   Skin: Skin is warm and dry. She is not diaphoretic.   Nursing note and vitals reviewed.      Significant Labs: All pertinent labs within the past 24 hours have been reviewed.    Significant Imaging: I have reviewed and interpreted all pertinent imaging results/findings within the past 24 hours.    Assessment/Plan:      * Sepsis    Pt met criteria for sepsis with fever, tachycardia due to pneumonia and bacteremia in immunocompromised host  CXR with R sided infiltrate  Blood cultures with Staph aureus in 3 out of 4 bottles in 5/26  Continue empiric Cefepime and Vancomycin  Continue to hold Cellcept for now  Continue tacrolimus and prednisone, and check tacrolimus level in am  Repeat blood cultures 5/28 are NGTD- continue to follow  If repeat blood cultures return positive, will need to check ECHO        Bacteremia    Due to Staph aureus  Repeat blood cultures 5/28 NGTD  As discussed above        Right lower lobe pneumonia    CXR with R sided infiltrate in immunocompromised patient  Continue cefepime and Vancomycin   As discussed above        Fever    As discussed above        Thrombocytopenia    Chronically low and consistently with previous levels  No reported bleeding  Outpatient Hem/Onc workup in progress        Seizures    No seizure activity   Continue levetiracetam        Elevated troponin    Trended markers and they are flat  No reported chest pain  Due to ESRD with chronically elevated levels  Not consistent with ACS        Moderate protein-calorie malnutrition    Chronic, will supplement with Nepro        Immunosuppressed    Due to transplant as discussed above        ESRD (end stage renal disease) on dialysis    Nephrology consulted for dialysis        Anemia in ESRD (end-stage renal disease)    H/H overall stable and  consistent with previous levels  No reported bleeding  Will monitor but no need for transfusion        Renovascular hypertension    Continue home medication regimen.        Liver replaced by transplant    Hemangioendothelioma s/p liver transplant in 1992  Will continue tacrolimus and hold Cellcept due to infection for now  Will check tacrolimus level in am-- low at 1.6. Repeat tomorrow          VTE Risk Mitigation         Ordered     IP VTE HIGH RISK PATIENT  Once      05/27/18 0032     Place sequential compression device  Until discontinued      05/27/18 0032              Janeen Breen MD  Department of Hospital Medicine   [facility]

## 2018-05-29 NOTE — PROGRESS NOTES
Holly Patel is a 27 y.o. female patient.    Follow for ESRD, dialysis    Patient seen while on dialysis  No new c/o, comfortable    Scheduled Meds:   albuterol-ipratropium  3 mL Nebulization Q6H    aspirin  81 mg Oral Daily    ceFEPime (MAXIPIME) IVPB  1 g Intravenous Q24H    cinacalcet  30 mg Oral Daily with breakfast    citalopram  20 mg Oral Daily    cloNIDine 0.2 mg/24 hr td ptwk  1 patch Transdermal Q7 Days    dextromethorphan-guaifenesin  mg/5 ml  10 mL Oral Q6H    epoetin anca  10,000 Units Intravenous Every Mon, Wed, Fri    famotidine  20 mg Oral Daily    hydrALAZINE  50 mg Oral Q8H    labetalol  400 mg Oral Q8H    lacosamide  50 mg Oral Q12H    levETIRAcetam  500 mg Oral BID    mycophenolate  250 mg Oral BID    NIFEdipine  90 mg Oral Daily    predniSONE  1 mg Oral Every other day    tacrolimus  7 mg Oral Daily       Review of patient's allergies indicates:   Allergen Reactions    Chloral hydrate      Other reaction(s): Hallucinations  Other reaction(s): Hives    Hydrocodone Other (See Comments)     Mental status changes         Vital Signs Range (Last 24H):  Temp:  [98.1 °F (36.7 °C)]   Pulse:  [79-84]   Resp:  [16-18]   BP: (120-137)/(66-91)   SpO2:  [96 %-100 %]     I & O (Last 24H):  Intake/Output Summary (Last 24 hours) at 05/29/18 1236  Last data filed at 05/29/18 0550   Gross per 24 hour   Intake              360 ml   Output                0 ml   Net              360 ml           Physical Exam:  General appearance: well developed, no distress  Lungs:  clear to auscultation bilaterally and normal respiratory effort  Heart: regular rate and rhythm  Abdomen: soft, non-tender non-distented; bowel sounds normal; no masses,  no organomegaly  Extremities: no cyanosis or edema, or clubbing    Laboratory:  CBC:   Recent Labs  Lab 05/29/18  0330   WBC 6.40   RBC 2.69*   HGB 7.3*   HCT 22.1*   PLT 82*   MCV 82   MCH 27.1   MCHC 33.0     CMP:   Recent Labs  Lab  05/29/18  0330   GLU 90   CALCIUM 8.0*   ALBUMIN 2.2*   PROT 6.5   *   K 4.1   CO2 22*      BUN 19   CREATININE 4.5*   ALKPHOS 392*   ALT 25   AST 27   BILITOT 0.5       Imp/Plan    ESRD - on dialysis, stable, tolerated well  Pneumonia  Staph bacteremia  Liver transplant status  HTN  Anemia of CKD    Continue present Rx  HD qTTS  We'll follow for dialysis      Jacquie Grover  5/29/2018

## 2018-05-29 NOTE — ASSESSMENT & PLAN NOTE
H/H overall stable and consistent with previous levels  No reported bleeding  Will monitor but no need for transfusion

## 2018-05-29 NOTE — ASSESSMENT & PLAN NOTE
Hemangioendothelioma s/p liver transplant in 1992  Will continue tacrolimus and hold Cellcept due to infection for now  Will check tacrolimus level in am

## 2018-05-29 NOTE — HOSPITAL COURSE
Ms. Patel was admitted with sepsis due to pneumonia and bacteremia in an immunocompromised patient. She was empirically treated with Cefepime and Vancomycin, and blood cultures were done. CXR was consistent with right infiltrate. Blood culture was remarkable for MSSA on 5/26. Nephrology was consulted for continued dialysis during hospital stay. Blood cultures 5/28 NGTD. Source of bacteremia likely dialysis access. Discharged on cefazolin after dialysis to complete 2 weeks of treatment for MSSA bacteremia.

## 2018-05-29 NOTE — ASSESSMENT & PLAN NOTE
Chronically low and consistently with previous levels  No reported bleeding  Outpatient Hem/Onc workup in progress

## 2018-05-29 NOTE — SUBJECTIVE & OBJECTIVE
Interval History: Seen during dialysis. Sleeping, awakens easily. No complaints.     Review of Systems   Constitutional: Negative for chills and fever.   Respiratory: Negative for shortness of breath.    Cardiovascular: Negative for chest pain.   Gastrointestinal: Negative for abdominal pain.     Objective:     Vital Signs (Most Recent):  Temp: 97.7 °F (36.5 °C) (05/29/18 1245)  Pulse: 82 (05/29/18 1325)  Resp: 18 (05/29/18 1325)  BP: 119/79 (05/29/18 1245)  SpO2: 99 % (05/29/18 1325) Vital Signs (24h Range):  Temp:  [97.7 °F (36.5 °C)-98.4 °F (36.9 °C)] 97.7 °F (36.5 °C)  Pulse:  [58-84] 82  Resp:  [16-18] 18  SpO2:  [96 %-100 %] 99 %  BP: ()/(58-91) 119/79     Weight: 50 kg (110 lb 3.7 oz)  Body mass index is 18.34 kg/m².    Intake/Output Summary (Last 24 hours) at 05/29/18 1419  Last data filed at 05/29/18 1230   Gross per 24 hour   Intake              860 ml   Output             2000 ml   Net            -1140 ml      Physical Exam   Constitutional: No distress.   Thin woman   HENT:   Head: Normocephalic and atraumatic.   Nose: Nose normal.   Mouth/Throat: Oropharynx is clear and moist.   Cardiovascular: Normal rate, regular rhythm and intact distal pulses.  Exam reveals no gallop and no friction rub.    Murmur (systolic 2/6) heard.  Pulmonary/Chest: Effort normal and breath sounds normal. No respiratory distress. She has no wheezes. She has no rales.   Abdominal: Soft. Bowel sounds are normal. She exhibits no distension and no mass. There is no tenderness. There is no guarding.   Skin: Skin is warm and dry. She is not diaphoretic.   Nursing note and vitals reviewed.      Significant Labs: All pertinent labs within the past 24 hours have been reviewed.    Significant Imaging: I have reviewed and interpreted all pertinent imaging results/findings within the past 24 hours.

## 2018-05-29 NOTE — ASSESSMENT & PLAN NOTE
Trended markers and they are flat  No reported chest pain  Due to ESRD with chronically elevated levels  Not consistent with ACS

## 2018-05-29 NOTE — SUBJECTIVE & OBJECTIVE
Interval History: Pt report feeling much better, no fever and appetite slowly returning    Review of Systems   Constitutional: Negative for chills and fever.   Respiratory: Negative for shortness of breath.    Cardiovascular: Negative for chest pain.     Objective:     Vital Signs (Most Recent):  Temp: 98.1 °F (36.7 °C) (05/28/18 2024)  Pulse: 82 (05/28/18 2024)  Resp: 18 (05/28/18 2024)  BP: 135/89 (05/28/18 2024)  SpO2: 99 % (05/28/18 2024) Vital Signs (24h Range):  Temp:  [97.6 °F (36.4 °C)-98.5 °F (36.9 °C)] 98.1 °F (36.7 °C)  Pulse:  [71-98] 82  Resp:  [14-20] 18  SpO2:  [95 %-100 %] 99 %  BP: (109-144)/(38-89) 135/89     Weight: 50 kg (110 lb 3.7 oz)  Body mass index is 18.34 kg/m².    Intake/Output Summary (Last 24 hours) at 05/28/18 2242  Last data filed at 05/28/18 1215   Gross per 24 hour   Intake              500 ml   Output             2509 ml   Net            -2009 ml      Physical Exam   Constitutional: She is oriented to person, place, and time. She appears well-developed. No distress.   HENT:   Head: Normocephalic and atraumatic.   Eyes: EOM are normal. Pupils are equal, round, and reactive to light.   Neck: Normal range of motion. Neck supple.   Cardiovascular: Normal rate and regular rhythm.    Pulmonary/Chest:   Course breath sounds at right base, mild rhonchi noted but no wheezing   Abdominal: Soft. Bowel sounds are normal. She exhibits no distension. There is no tenderness.   Musculoskeletal: Normal range of motion.   Neurological: She is alert and oriented to person, place, and time.   Skin: Skin is warm and dry. Capillary refill takes less than 2 seconds. She is not diaphoretic.   Left UE AVF noted with no pus or drainage   Psychiatric: She has a normal mood and affect. Her behavior is normal. Thought content normal.       Significant Labs: All pertinent labs within the past 24 hours have been reviewed.    Significant Imaging: I have reviewed and interpreted all pertinent imaging  results/findings within the past 24 hours.

## 2018-05-29 NOTE — ASSESSMENT & PLAN NOTE
Hemangioendothelioma s/p liver transplant in 1992  Will continue tacrolimus and hold Cellcept due to infection for now  Will check tacrolimus level in am-- low at 1.6. Repeat tomorrow

## 2018-05-29 NOTE — PROGRESS NOTES
[facility] Hospital Medicine  Progress Note    Patient Name: Holly Patel  MRN: [id number]  Patient Class: IP- Inpatient   Admission Date: 2018  Length of Stay: 2 days  Attending Physician: Jannet Maharaj MD  Primary Care Provider: Stan Sosa MD        Subjective:     Principal Problem:Sepsis    HPI:  Holly Patel is a 27 y.o. female that (in part)  has a past medical history of Anemia in ESRD (end-stage renal disease); Chronic rejection of liver transplant; Encounter for blood transfusion; ESRD on hemodialysis; History of splenomegaly; Immunosuppressed; Iron deficiency anemia secondary to inadequate dietary iron intake; Liver replaced by transplant; Moderate protein-calorie malnutrition; MRSA bacteremia; Prophylactic immunotherapy; Renovascular hypertension; Secondary hyperparathyroidism; Sialadenitis; Thrombocytopenia; and Thrombocytopenia.  has a past surgical history that includes Liver transplant (1992); Liver biopsy;  section; and Tubal ligation ().   Presents to [facility] Emergency Department complaining of fever beginning with acute onset at dialysis this morning.  Associated with generalized body aches and generalized fatigue for the last 3 days.  Occasional nonproductive cough.  Denies chest pain, abdominal pain, diarrhea, sore throat, ear pain, sinus pain, congestion, or other obvious infections or wounds.  Reports compliance with her home medication regimen.  Immunocompromised state due to liver transplant.  Denies recent travel or sick contacts, although she is a dialysis patient.    In the emergency department routine laboratory studies, chest x-ray, and cultures were obtained.  Portable x-ray was concerning for possible pneumonia which led to a PA and lateral demonstrating some equivocal evidence for right lower lobe pneumonia versus atelectasis.  Given the patient's immunocompromised state and fever, empiric  antibiotics were initiated and the patient is being admitted for further evaluation and treatment.    Hospital medicine has been asked to admit for further evaluation and treatment.       Hospital Course:  Ms. Patel was admitted with sepsis due to pneumonia and bacteremia in an immunocompromised patient. She was empirically treated with Cefepime and Vancomycin, and blood cultures were done. CXR was consistent with right infiltrate. Blood culture was remarkable for G+ cocci in clusters in 3 out of 4 bottles. Nephrology was consulted for continued dialysis during hospital stay.    Interval History: Pt report feeling much better, no fever and appetite slowly returning    Review of Systems   Constitutional: Negative for chills and fever.   Respiratory: Negative for shortness of breath.    Cardiovascular: Negative for chest pain.     Objective:     Vital Signs (Most Recent):  Temp: 98.1 °F (36.7 °C) (05/28/18 2024)  Pulse: 82 (05/28/18 2024)  Resp: 18 (05/28/18 2024)  BP: 135/89 (05/28/18 2024)  SpO2: 99 % (05/28/18 2024) Vital Signs (24h Range):  Temp:  [97.6 °F (36.4 °C)-98.5 °F (36.9 °C)] 98.1 °F (36.7 °C)  Pulse:  [71-98] 82  Resp:  [14-20] 18  SpO2:  [95 %-100 %] 99 %  BP: (109-144)/(38-89) 135/89     Weight: 50 kg (110 lb 3.7 oz)  Body mass index is 18.34 kg/m².    Intake/Output Summary (Last 24 hours) at 05/28/18 2242  Last data filed at 05/28/18 1215   Gross per 24 hour   Intake              500 ml   Output             2509 ml   Net            -2009 ml      Physical Exam   Constitutional: She is oriented to person, place, and time. She appears well-developed. No distress.   HENT:   Head: Normocephalic and atraumatic.   Eyes: EOM are normal. Pupils are equal, round, and reactive to light.   Neck: Normal range of motion. Neck supple.   Cardiovascular: Normal rate and regular rhythm.    Pulmonary/Chest:   Course breath sounds at right base, mild rhonchi noted but no wheezing   Abdominal: Soft. Bowel sounds are  normal. She exhibits no distension. There is no tenderness.   Musculoskeletal: Normal range of motion.   Neurological: She is alert and oriented to person, place, and time.   Skin: Skin is warm and dry. Capillary refill takes less than 2 seconds. She is not diaphoretic.   Left UE AVF noted with no pus or drainage   Psychiatric: She has a normal mood and affect. Her behavior is normal. Thought content normal.       Significant Labs: All pertinent labs within the past 24 hours have been reviewed.    Significant Imaging: I have reviewed and interpreted all pertinent imaging results/findings within the past 24 hours.    Assessment/Plan:      * Sepsis    Pt met criteria for sepsis with fever, tachycardia due to pneumonia and bacteremia in immunocompromised host  CXR with R sided infiltrate  Blood cultures with Staph aureus in 3 out of 4 bottles  Continue empiric Cefepime and Vancomycin  Continue to hold Cellcept for now  Continue tacrolimus and prednisone, and check tacrolimus level in am  Repeat blood culture today to assess for clearance  If repeat blood cultures return positive, will need to check ECHO        Right lower lobe pneumonia    CXR with R sided infiltrate in immunocompromised patient  Continue cefepime and Vancomycin   As discussed above        Bacteremia    Due to Staph aureus  Repeat blood cultures today  As discussed above        Liver replaced by transplant    Hemangioendothelioma s/p liver transplant in 1992  Will continue tacrolimus and hold Cellcept due to infection for now  Will check tacrolimus level in am        Fever    As discussed above        Thrombocytopenia    Chronically low and consistently with previous levels  No reported bleeding  Outpatient Hem/Onc workup in progress        Seizures    No seizure activity   Continue levetiracetam        Elevated troponin    Trended markers and they are flat  No reported chest pain  Due to ESRD with chronically elevated levels  Not consistent with ACS         Moderate protein-calorie malnutrition    Chronic, will supplement with Nepro        Immunosuppressed    Due to transplant as discussed above        ESRD (end stage renal disease) on dialysis    Nephrology consulted for dialysis        Anemia in ESRD (end-stage renal disease)    H/H overall stable and consistent with previous levels  No reported bleeding  Will monitor but no need for transfusion        Renovascular hypertension    Continue home medication regimen.          VTE Risk Mitigation         Ordered     IP VTE HIGH RISK PATIENT  Once      05/27/18 0032     Place sequential compression device  Until discontinued      05/27/18 0032              Jannet Maharaj MD  Department of Hospital Medicine   [facility]

## 2018-05-29 NOTE — PLAN OF CARE
Problem: Patient Care Overview  Goal: Plan of Care Review  Patient rested well overnight. Vital signs stable. Patient denies shortness of breath or pain. No acute distress noted at this time.

## 2018-05-29 NOTE — PHYSICIAN QUERY
PT Name: Holly Patel  MR #: [id number]    Physician Query Form - Cause and Effect Relationship Clarification      CDS/: Fiordaliza Carcamo               Contact information:[email or web address]    This form is a permanent document in the medical record.     Query Date: May 29, 2018    By submitting this query, we are merely seeking further clarification of documentation. Please utilize your independent clinical judgment when addressing the question(s) below.    The Medical record contains the following:  Supporting Clinical Findings   Location in record                                                                      Ms. Patel was admitted with sepsis due to pneumonia and bacteremia in an immunocompromised patient                                                                                                                       HM PN 5/28                                                                       STAPHYLOCOCCUS AUREUS                                                                                                                          Blood cx results 5/26         Provider, please clarify if there is any correlation between __sepsis_ and _STAPHYLOCOCCUS AUREUS_____.           Are the conditions:     [ X ] Due to or associated with each other     [  ] Unrelated to each other     [  ] Other (Please Specify): _________________________     [  ] Clinically Undetermined

## 2018-05-30 ENCOUNTER — TELEPHONE (OUTPATIENT)
Dept: HEMATOLOGY/ONCOLOGY | Facility: CLINIC | Age: 28
End: 2018-05-30

## 2018-05-30 VITALS
HEART RATE: 81 BPM | HEIGHT: 65 IN | SYSTOLIC BLOOD PRESSURE: 128 MMHG | RESPIRATION RATE: 14 BRPM | OXYGEN SATURATION: 93 % | WEIGHT: 110.25 LBS | TEMPERATURE: 97 F | DIASTOLIC BLOOD PRESSURE: 74 MMHG | BODY MASS INDEX: 18.37 KG/M2

## 2018-05-30 PROBLEM — R93.89 ABNORMAL CXR: Status: ACTIVE | Noted: 2018-05-27

## 2018-05-30 PROBLEM — B95.61 MSSA BACTEREMIA: Status: ACTIVE | Noted: 2018-05-28

## 2018-05-30 LAB
ALBUMIN SERPL BCP-MCNC: 2.2 G/DL
ALP SERPL-CCNC: 465 U/L
ALT SERPL W/O P-5'-P-CCNC: 30 U/L
ANION GAP SERPL CALC-SCNC: 8 MMOL/L
AST SERPL-CCNC: 32 U/L
BASOPHILS # BLD AUTO: 0.01 K/UL
BASOPHILS NFR BLD: 0.2 %
BILIRUB SERPL-MCNC: 0.4 MG/DL
BUN SERPL-MCNC: 17 MG/DL
CALCIUM SERPL-MCNC: 8.4 MG/DL
CHLORIDE SERPL-SCNC: 104 MMOL/L
CO2 SERPL-SCNC: 24 MMOL/L
CREAT SERPL-MCNC: 4.1 MG/DL
DIFFERENTIAL METHOD: ABNORMAL
EOSINOPHIL # BLD AUTO: 0.5 K/UL
EOSINOPHIL NFR BLD: 10.1 %
ERYTHROCYTE [DISTWIDTH] IN BLOOD BY AUTOMATED COUNT: 17.5 %
EST. GFR  (AFRICAN AMERICAN): 16 ML/MIN/1.73 M^2
EST. GFR  (NON AFRICAN AMERICAN): 14 ML/MIN/1.73 M^2
GLUCOSE SERPL-MCNC: 98 MG/DL
HCT VFR BLD AUTO: 23.4 %
HGB BLD-MCNC: 7.7 G/DL
LYMPHOCYTES # BLD AUTO: 0.8 K/UL
LYMPHOCYTES NFR BLD: 16 %
MCH RBC QN AUTO: 27.3 PG
MCHC RBC AUTO-ENTMCNC: 32.9 G/DL
MCV RBC AUTO: 83 FL
MONOCYTES # BLD AUTO: 0.4 K/UL
MONOCYTES NFR BLD: 7.6 %
NEUTROPHILS # BLD AUTO: 3.4 K/UL
NEUTROPHILS NFR BLD: 65.1 %
PLATELET # BLD AUTO: 101 K/UL
PMV BLD AUTO: 9.6 FL
POTASSIUM SERPL-SCNC: 3.8 MMOL/L
PROT SERPL-MCNC: 7 G/DL
RBC # BLD AUTO: 2.82 M/UL
SODIUM SERPL-SCNC: 136 MMOL/L
TACROLIMUS BLD-MCNC: 3 NG/ML
WBC # BLD AUTO: 5.14 K/UL

## 2018-05-30 PROCEDURE — 63600175 PHARM REV CODE 636 W HCPCS: Performed by: EMERGENCY MEDICINE

## 2018-05-30 PROCEDURE — 80053 COMPREHEN METABOLIC PANEL: CPT

## 2018-05-30 PROCEDURE — 94640 AIRWAY INHALATION TREATMENT: CPT

## 2018-05-30 PROCEDURE — 25000003 PHARM REV CODE 250: Performed by: HOSPITALIST

## 2018-05-30 PROCEDURE — 25000003 PHARM REV CODE 250: Performed by: EMERGENCY MEDICINE

## 2018-05-30 PROCEDURE — 25000242 PHARM REV CODE 250 ALT 637 W/ HCPCS: Performed by: HOSPITALIST

## 2018-05-30 PROCEDURE — 36415 COLL VENOUS BLD VENIPUNCTURE: CPT

## 2018-05-30 PROCEDURE — 85025 COMPLETE CBC W/AUTO DIFF WBC: CPT

## 2018-05-30 PROCEDURE — 80197 ASSAY OF TACROLIMUS: CPT

## 2018-05-30 RX ORDER — FAMOTIDINE 20 MG/1
20 TABLET, FILM COATED ORAL DAILY
Qty: 20 TABLET | Refills: 0 | Status: SHIPPED | OUTPATIENT
Start: 2018-05-30 | End: 2018-06-18

## 2018-05-30 RX ADMIN — LEVETIRACETAM 500 MG: 500 TABLET, FILM COATED ORAL at 08:05

## 2018-05-30 RX ADMIN — GUAIFENESIN AND DEXTROMETHORPHAN 10 ML: 100; 10 SYRUP ORAL at 12:05

## 2018-05-30 RX ADMIN — IPRATROPIUM BROMIDE AND ALBUTEROL SULFATE 3 ML: .5; 2.5 SOLUTION RESPIRATORY (INHALATION) at 12:05

## 2018-05-30 RX ADMIN — LABETALOL HYDROCHLORIDE 400 MG: 100 TABLET, FILM COATED ORAL at 05:05

## 2018-05-30 RX ADMIN — MYCOPHENOLATE MOFETIL 250 MG: 250 CAPSULE ORAL at 08:05

## 2018-05-30 RX ADMIN — IPRATROPIUM BROMIDE AND ALBUTEROL SULFATE 3 ML: .5; 2.5 SOLUTION RESPIRATORY (INHALATION) at 07:05

## 2018-05-30 RX ADMIN — CINACALCET HYDROCHLORIDE 30 MG: 30 TABLET, COATED ORAL at 08:05

## 2018-05-30 RX ADMIN — LACOSAMIDE 50 MG: 50 TABLET, FILM COATED ORAL at 08:05

## 2018-05-30 RX ADMIN — FAMOTIDINE 20 MG: 20 TABLET ORAL at 08:05

## 2018-05-30 RX ADMIN — NIFEDIPINE 90 MG: 30 TABLET, FILM COATED, EXTENDED RELEASE ORAL at 08:05

## 2018-05-30 RX ADMIN — ASPIRIN 81 MG CHEWABLE TABLET 81 MG: 81 TABLET CHEWABLE at 09:05

## 2018-05-30 RX ADMIN — CITALOPRAM HYDROBROMIDE 20 MG: 20 TABLET ORAL at 08:05

## 2018-05-30 RX ADMIN — GUAIFENESIN AND DEXTROMETHORPHAN 10 ML: 100; 10 SYRUP ORAL at 05:05

## 2018-05-30 RX ADMIN — HYDRALAZINE HYDROCHLORIDE 50 MG: 25 TABLET ORAL at 05:05

## 2018-05-30 NOTE — PROGRESS NOTES
WRITTEN HEALTHCARE DISCHARGE INFORMATION     Things that YOU are responsible for to Manage Your Care At Home:  1. Getting your prescriptions filled.  2. Taking you medications as directed. DO NOT MISS ANY DOSES!  3. Going to your follow-up doctor appointments. This is important because it allows the doctor to monitor your progress and to determine if any changes need to be made to your treatment plan.    If you are unable to make your follow up appointments, please call the number listed and reschedule this appointment.     After discharge, if you need assistance, you can call [facility] Line for 24/7 assistance at [phone]    If you are experience any signs or symptoms, Call your doctor or Call 911 and come to your nearest Emergency Room.    Thank you for choosing [facility] and allowing us to care for you.   From your care management team: Natali SINCLAIR,RSW [phone]     You should receive a call from [facility]charge [facility] within 48-72 hours to help manage your care after discharge. Please try to make sure that you answer your phone for this important phone call.     Follow-up Information     Stan Sosa MD On 6/18/2018.    Specialty:  Internal Medicine  Why:  Monday at 10am. Hospital Follow up appointment with ROXIE Villanueva   Contact information:  [address] [facility] [address] [phone]             [facility]    Why:  Dialysis: Tuesday, Thursday and Saturday with IV ABX.   Contact information:  [address]  [facility] [address]

## 2018-05-30 NOTE — PROGRESS NOTES
OFELIA spoke with Trish [facility], in regards to pt's IV ABX during Dialysis. Trish voiced understanding. OFELIA faxed FILOMENA Chambers ABX for treatment to [phone]. Awaiting Confirmation of receipt.

## 2018-05-30 NOTE — PLAN OF CARE
Problem: Patient Care Overview  Goal: Plan of Care Review  Outcome: Ongoing (interventions implemented as appropriate)  Pt progressing. Oriented X4. Voiding well. Skin integrity maintained. Denies pain and nausea during. Vs stable. Adequate oral intake. Tolerating diet and Iv antibiotics. Free of falls. Call light within reach. Bed in low position. No issues during shift. Continue plan of care.

## 2018-05-30 NOTE — TELEPHONE ENCOUNTER
----- Message from Carmelina Taylor sent at 5/30/2018  2:29 PM CDT -----  Contact: pt Mom  Pt Mom called to schedule appt for pt  Callback#[phone]  Thank You  MARK Taylor

## 2018-05-30 NOTE — PLAN OF CARE
Problem: Patient Care Overview  Goal: Plan of Care Review  Patient aerosol Q6 given via msk with air tolerated well sats 99% on RA

## 2018-05-30 NOTE — PLAN OF CARE
"   05/30/18 1228   Final Note   Assessment Type Final Discharge Note   Discharge Disposition Home   What phone number can be called within the next 1-3 days to see how you are doing after discharge? [phone]   Hospital Follow Up  Appt(s) scheduled? Yes   Discharge plans and expectations educations in teach back method with documentation complete? Yes   Right Care Referral Info   Post Acute Recommendation No Care   Facility Name [facility]     EDUCATION:  Pt provided with educational information on " Sepsis ".  Information reviewed and placed in :My Healthcare Packet" to be brought home for pt to use as resource after discharge.  Information included:  signs and symptoms to look for and call the doctor if experiencing, and symptoms that may indicate a medical emergency: CALL 911.      All questions answered.  Teach back method used.  Pt  verbalized understanding of all information by stating, "  That he is aware of the signs and symptoms to watch for and when to contact MD and when to report to the ED immediately. Also SW took this time to ask pt to provide at least 3 symptoms. Pt stated Fever, Chest Pain, and Chills. SW inquired what pt is responsible to manage his care at home following d/c from hospital.  Pt informed SW take Rx to the pharmacy to be filled when discharged, be sure to take as prescribed and to go to any and all appointments scheduled at time of discharge.    "

## 2018-05-31 ENCOUNTER — ANESTHESIA EVENT (OUTPATIENT)
Dept: SURGERY | Facility: OTHER | Age: 28
End: 2018-05-31
Payer: MEDICARE

## 2018-05-31 ENCOUNTER — TELEPHONE (OUTPATIENT)
Dept: HEMATOLOGY/ONCOLOGY | Facility: CLINIC | Age: 28
End: 2018-05-31

## 2018-05-31 ENCOUNTER — HOSPITAL ENCOUNTER (OUTPATIENT)
Dept: PREADMISSION TESTING | Facility: OTHER | Age: 28
Discharge: HOME | End: 2018-05-31
Attending: OBSTETRICS & GYNECOLOGY
Payer: MEDICARE

## 2018-05-31 ENCOUNTER — OFFICE VISIT (OUTPATIENT)
Dept: OBSTETRICS AND GYNECOLOGY | Facility: CLINIC | Age: 28
End: 2018-05-31
Attending: OBSTETRICS & GYNECOLOGY
Payer: MEDICARE

## 2018-05-31 VITALS
BODY MASS INDEX: 17.66 KG/M2 | HEART RATE: 75 BPM | HEIGHT: 65 IN | OXYGEN SATURATION: 100 % | DIASTOLIC BLOOD PRESSURE: 70 MMHG | SYSTOLIC BLOOD PRESSURE: 114 MMHG | WEIGHT: 106 LBS | TEMPERATURE: 98 F

## 2018-05-31 VITALS
DIASTOLIC BLOOD PRESSURE: 58 MMHG | WEIGHT: 106.69 LBS | SYSTOLIC BLOOD PRESSURE: 130 MMHG | BODY MASS INDEX: 17.76 KG/M2

## 2018-05-31 DIAGNOSIS — N87.1 MODERATE CERVICAL DYSPLASIA: ICD-10-CM

## 2018-05-31 DIAGNOSIS — Z01.818 PREOPERATIVE EXAM FOR GYNECOLOGIC SURGERY: Primary | ICD-10-CM

## 2018-05-31 PROCEDURE — 99213 OFFICE O/P EST LOW 20 MIN: CPT | Mod: S$PBB,,, | Performed by: OBSTETRICS & GYNECOLOGY

## 2018-05-31 PROCEDURE — 99999 PR PBB SHADOW E&M-EST. PATIENT-LVL III: CPT | Mod: PBBFAC,,, | Performed by: OBSTETRICS & GYNECOLOGY

## 2018-05-31 PROCEDURE — 99213 OFFICE O/P EST LOW 20 MIN: CPT | Mod: PBBFAC | Performed by: OBSTETRICS & GYNECOLOGY

## 2018-05-31 RX ORDER — LIDOCAINE HYDROCHLORIDE 10 MG/ML
0.5 INJECTION, SOLUTION EPIDURAL; INFILTRATION; INTRACAUDAL; PERINEURAL ONCE
Status: CANCELLED | OUTPATIENT
Start: 2018-05-31 | End: 2018-05-31

## 2018-05-31 RX ORDER — NIFEDIPINE 60 MG/1
TABLET, EXTENDED RELEASE ORAL
Refills: 0 | Status: ON HOLD | COMMUNITY
Start: 2018-04-18 | End: 2018-06-19

## 2018-05-31 RX ORDER — SODIUM CHLORIDE, SODIUM LACTATE, POTASSIUM CHLORIDE, CALCIUM CHLORIDE 600; 310; 30; 20 MG/100ML; MG/100ML; MG/100ML; MG/100ML
INJECTION, SOLUTION INTRAVENOUS CONTINUOUS
Status: CANCELLED | OUTPATIENT
Start: 2018-05-31

## 2018-05-31 RX ORDER — ALBUTEROL SULFATE 0.83 MG/ML
2.5 SOLUTION RESPIRATORY (INHALATION)
Status: CANCELLED | OUTPATIENT
Start: 2018-05-31 | End: 2018-05-31

## 2018-05-31 NOTE — DISCHARGE INSTRUCTIONS
PRE-ADMIT TESTING -  [phone]    [address]  [facility]          Your surgery has been scheduled at [facility]. We are pleased to have the opportunity to serve you. For Further Information please call [phone].    On the day of surgery please report to the Information Desk on the 1st floor.    · CONTACT YOUR PHYSICIAN'S OFFICE THE DAY PRIOR TO YOUR SURGERY TO OBTAIN YOUR ARRIVAL TIME.     · The evening before surgery do not eat anything after 9 p.m. ( this includes hard candy, chewing gum and mints).  You may only have GATORADE, POWERADE AND WATER  from 9 p.m. until you leave your home.   DO NOT DRINK ANY LIQUIDS ON THE WAY TO THE HOSPITAL.      SPECIAL MEDICATION INSTRUCTIONS: TAKE medications checked off by the Anesthesiologist on your Medication List.    Angiogram Patients: Take medications as instructed by your physician, including aspirin.     Surgery Patients:    If you take ASPIRIN - Your PHYSICIAN/SURGEON will need to inform you IF/OR when you need to stop taking aspirin prior to your surgery.     Do Not take any medications containing IBUPROFEN.  Do Not Wear any make-up or dark nail polish   (especially eye make-up) to surgery. If you come to surgery with makeup on you will be required to remove the makeup or nail polish.    Do not shave your surgical area at least 5 days prior to your surgery. The surgical prep will be performed at the hospital according to Infection Control regulations.    Leave all valuables at home.   Do Not wear any jewelry or watches, including any metal in body piercings.  Contact Lens must be removed before surgery. Either do not wear the contact lens or bring a case and solution for storage.  Please bring a container for eyeglasses or dentures as required.  Bring any paperwork your physician has provided, such as consent forms,  history and physicals, doctor's orders, etc.   Bring comfortable clothes that are loose fitting to wear upon  discharge. Take into consideration the type of surgery being performed.  Maintain your diet as advised per your physician the day prior to surgery.      Adequate rest the night before surgery is advised.   Park in the Parking lot behind the hospital or in the [facility] across the street from the parking lot. Parking is complimentary.  If you will be discharged the same day as your procedure, please arrange for a responsible adult to drive you home or to accompany you if traveling by taxi.   YOU WILL NOT BE PERMITTED TO DRIVE OR TO LEAVE THE HOSPITAL ALONE AFTER SURGERY.   It is strongly recommended that you arrange for someone to remain with you for the first 24 hrs following your surgery.       Thank you for your cooperation.  The Staff of [facility].        Bathing Instructions                                                                 Please shower the evening before and morning of your procedure with    ANTIBACTERIAL SOAP. ( DIAL, etc )  Concentrate on the surgical area   for at least 3 minutes and rinse completely. Dry off as usual.   Do not use any deodorant, powder, body lotions, perfume, after shave or    cologne.

## 2018-05-31 NOTE — TELEPHONE ENCOUNTER
----- Message from Yesenia Byrne RN sent at 5/31/2018  8:44 AM CDT -----  Contact: Pt mother    Pt's mother returning your call  Thanks  ----- Message -----  From: Marilu Diaz  Sent: 5/30/2018   5:07 PM  To: [facility] Staff    Pt mother missed a phone call from the nurse and would like a call back at her earliest convenience       Pt mother can be contacted at [phone]

## 2018-05-31 NOTE — ASSESSMENT & PLAN NOTE
Pt met criteria for sepsis with fever, tachycardia due to bacteremia in immunocompromised host  Likely due to dialysis access  Started on empiric cefepime and vanc   Blood cultures with MSSA in 5/26  Repeat blood cultures 5/28 are NGTD- continue to follow  Discharged on cefazolin with dialysis x2 weeks

## 2018-05-31 NOTE — ASSESSMENT & PLAN NOTE
CXR with R sided infiltrate vs atelectasis in immunocompromised patient  Initially started on vanc and cefepime  CXR finding more likely due to volume than pneumonia

## 2018-05-31 NOTE — PHYSICIAN QUERY
PT Name: Holly Patel  MR #: [id number]     Physician Query Form - Diagnosis Clarification      CDS/: Fiordaliza Carcamo               Contact information:[email or web address]    This form is a permanent document in the medical record.     Query Date: May 31, 2018    By submitting this query, we are merely seeking further clarification of documentation.  Please utilize your independent clinical judgment when addressing the question(s) below.     The medical record contains the following:      Findings Supporting Clinical Information Location in Medical Record   Right lower lobe pneumonia CXR with R sided infiltrate in immunocompromised patient  Continue cefepime and Vancomycin     Ms. Patel was admitted with sepsis due to pneumonia and bacteremia in an immunocompromised patient      CXR finding more likely due to volume than pneumonia     Pt met criteria for sepsis with fever, tachycardia due to bacteremia in immunocompromised host  Likely due to dialysis access HM PN 5/28        HM PN 5/28, 5/29, DS          DS 5/30     Please clarify if the _____pneumonia______ diagnosis has been:    [  ] Ruled In  [  ] Ruled In, Now Resolved  [ x ] Ruled Out  [  ] Clinically insignificant  [  ] Clinically undetermined  [  ] Other/Clarification of findings (please specify)_______________________________    Please document in your progress notes daily for the duration of treatment, until resolved, and include in your discharge summary.

## 2018-05-31 NOTE — ASSESSMENT & PLAN NOTE
Hemangioendothelioma s/p liver transplant in 1992  Continue home medications  Follow up with liver transplant

## 2018-05-31 NOTE — H&P
CHANDRIKA Pre-op Exam      HPI : Holly Patel is a 27 y.o. female  female who presents for preop exam for a LEEP Procedure scheduled on Kandis 15,2018 for Moderate Cervical Dysplasia .  She already has a transplanted liver, and is on the waiting list for a kidney transplant.  She was recently hospitalized with pneumonia and suspected sepsis and is getting IV antibiotics at the time of her Dialysis treatments. She also has thrombocytopenia, and was told by her hematologist that she may need Platelet transfusion prior to her LEEP procedure depending on her platelet count at that time.    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 ()    Moderate protein-calorie malnutrition 2017    MRSA bacteremia 2017    Prophylactic immunotherapy 2014    Renovascular hypertension 10/2/2015    Secondary hyperparathyroidism 2017    Sialadenitis 3/21/2018    Thrombocytopenia 2016    Thrombocytopenia 2016     Past Surgical History:   Procedure Laterality Date     SECTION      x 2    LIVER BIOPSY      LIVER TRANSPLANT  1992    TUBAL LIGATION  2010     Family History   Problem Relation Age of Onset    Hypertension Mother     Hypertension Father     Melanoma Neg Hx     Breast cancer Neg Hx     Colon cancer Neg Hx     Ovarian cancer Neg Hx      OB History      Para Term  AB Living    2 2 0 2 0 2    SAB TAB Ectopic Multiple Live Births    0 0 0   2        Review of patient's allergies indicates:   Allergen  Reactions    Chloral hydrate      Other reaction(s): Hallucinations  Other reaction(s): Hives    Hydrocodone Other (See Comments)     Mental status changes       Current Outpatient Prescriptions:     aspirin 81 MG Chew, Take 1 tablet (81 mg total) by mouth once daily., Disp: , Rfl: 0    butalbital-acetaminophen-caffeine -40 mg (FIORICET, ESGIC) -40 mg per tablet, Take 1 tablet by mouth every 6 (six) hours as needed for Headaches., Disp: 15 tablet, Rfl: 0    cefazolin sodium (CEFAZOLIN 2G/20 ML SYRINGE, PYXIS,), Inject 20 mLs (2 g total) into the vein every Tues, Thurs, Sat. Last dose 6/9/2018, Disp: 100 mL, Rfl: 0    cinacalcet (SENSIPAR) 30 MG Tab, Take 1 tablet (30 mg total) by mouth daily with breakfast., Disp: 30 tablet, Rfl: 11    citalopram (CELEXA) 20 MG tablet, Take 1 tablet (20 mg total) by mouth once daily., Disp: 30 tablet, Rfl: 1    cloNIDine 0.2 mg/24 hr td ptwk (CATAPRES) 0.2 mg/24 hr, Place 1 patch onto the skin every 7 days. Change every Wednesday, Disp: 4 patch, Rfl: 11    famotidine (PEPCID) 20 MG tablet, Take 1 tablet (20 mg total) by mouth once daily., Disp: 20 tablet, Rfl: 0    food supplemt, lactose-reduced (ENSURE ACTIVE HIGH PROTEIN) Liqd, Take 236 mLs by mouth 2 (two) times daily., Disp: 60 Can, Rfl: 6    hydrALAZINE (APRESOLINE) 50 MG tablet, Take 1 tablet (50 mg total) by mouth every 8 (eight) hours., Disp: 90 tablet, Rfl: 5    labetalol (NORMODYNE) 200 MG tablet, Take 2 tablets (400 mg total) by mouth every 8 (eight) hours., Disp: 360 tablet, Rfl: 11    lacosamide (VIMPAT) 50 mg Tab, Take by mouth every 12 (twelve) hours., Disp: , Rfl:     levETIRAcetam (KEPPRA) 500 MG Tab, Take 500 mg by mouth 2 (two) times daily., Disp: , Rfl:     mycophenolate (CELLCEPT) 250 mg Cap, Take 1,000 mg by mouth 2 (two) times daily., Disp: , Rfl:     NIFEdipine (PROCARDIA-XL) 60 MG (OSM) 24 hr tablet, TAKE ONE(1) TABLET BY MOUTH EVERY DAY, Disp: , Rfl: 0    NIFEdipine  (PROCARDIA-XL) 90 MG (OSM) 24 hr tablet, Take 1 tablet (90 mg total) by mouth once daily. (Patient taking differently: Take 60 mg by mouth once daily. ), Disp: 30 tablet, Rfl: 11    ondansetron (ZOFRAN) 4 MG tablet, Take 1 tablet (4 mg total) by mouth every 6 (six) hours as needed for Nausea., Disp: 15 tablet, Rfl: 0    predniSONE (DELTASONE) 1 MG tablet, Take 1 mg by mouth every other day., Disp: , Rfl:     tacrolimus (PROGRAF) 1 MG Cap, Take 7 capsules (7 mg total) by mouth every 12 (twelve) hours., Disp: 420 capsule, Rfl: 11    triamcinolone acetonide 0.1% (KENALOG) 0.1 % ointment, AAA on arms, legs, and neck bid x 1-2 wks then prn flares only (Patient taking differently: Apply to affected area(s) on arms, legs, and neck twice daily x 1-2 wks then as needed for flares only), Disp: 80 g, Rfl: 3  No current facility-administered medications for this visit.   Social History     Social History    Marital status: Legally      Spouse name: N/A    Number of children: N/A    Years of education: N/A     Occupational History    Not on file.     Social History Main Topics    Smoking status: Never Smoker    Smokeless tobacco: Never Used    Alcohol use No    Drug use: No    Sexual activity: No     Other Topics Concern    Are You Pregnant Or Think You May Be? No    Breast-Feeding No     Social History Narrative    Lives 2 kids, 7 and 8, and nephew. Her mom helps with kids.       Last pap : March 28,2018: ASCUS, + HR HPV, not 16/18  Last pathology : APril 30,2018:    FINAL PATHOLOGIC DIAGNOSIS  1. FRAGMENT OF BENIGN ENDOCERVICAL MUCOSA AND A DETACHED FRAGMENT OF SQUAMOUS  ILEUM SHOWING MODERATE DYSPLASIA AND ACUTE AND CHRONIC INFLAMMATION  2. CERVICAL BIOPSY SHOWING MODERATE SQUAMOUS DYSPLASIA (ROSHNI-2) AND CHRONIC  INFLAMMATION  NOTE: THESE BIOPSY FINDINGS CORRELATE WITH THE PATIENT'S PRIOR PAP SMEAR    BP (!) 130/58   Wt 48.4 kg (106 lb 11.2 oz)   LMP 04/30/2018 (Exact Date)   BMI 17.76 kg/m²      ROS:    GENERAL: Denies weight gain or weight loss. Feeling WEAK overall. (recently hospitalized with sepsis and pneumonia)  SKIN: Denies rash or lesions.   HEAD: Denies head injury or headache.   NODES: Denies enlarged lymph nodes.   CHEST: Denies chest pain or shortness of breath.   CARDIOVASCULAR: Denies palpitations or left sided chest pain.   ABDOMEN: No abdominal pain, constipation, diarrhea, nausea, vomiting or rectal bleeding.   URINARY: No frequency, dysuria, hematuria, or burning on urination.  REPRODUCTIVE: See HPI.   BREASTS: The patient performs breast self-examination and denies pain, lumps, or nipple discharge.   HEMATOLOGIC: No easy bruisability or excessive bleeding with the exception of menstrual cycles.  MUSCULOSKELETAL: Denies joint pain or swelling.   NEUROLOGIC: Denies syncope or weakness.   PSYCHIATRIC: Denies depression, anxiety or mood swings.    PHYSICAL EXAM:    APPEARANCE: Well nourished, well developed, in no acute distress.  AFFECT: WNL, alert and oriented x 3  SKIN: No acne or hirsutism  NECK: Neck symmetric without masses or thyromegaly  NODES: No inguinal, cervical, axillary, or femoral lymph node enlargement  CHEST: Good respiratory effect  ABDOMEN: Soft.  No tenderness or masses.  No hepatosplenomegaly.  No hernias.  PELVIC: Normal external genitalia without lesions.  Normal hair distribution.  Adequate perineal body, normal urethral meatus.  Vagina moist and well rugated without lesions or discharge.  Cervix pink, without lesions, discharge or tenderness.  No significant cystocele or rectocele.  Bimanual exam shows uterus to be normal size, regular, mobile and nontender.  Adnexa without masses or tenderness.    EXTREMITIES: No edema.    ASSESSMENT & PLAN:    Holly was seen today for pre-op exam.    Diagnoses and all orders for this visit:    Preoperative exam for gynecologic surgery  -     Vital signs; Standing  -     Up ad russ; Standing  -     Chlorhexidine (CHG) 2% Wipes;  Standing  -     Chlorohexidine Gluconate Bath; Standing  -     Place in Outpatient; Standing  -     Diet clear liquid; Standing  -     Place LINAD hose; Standing  -     Place sequential compression device; Standing    Moderate cervical dysplasia  -     Vital signs; Standing  -     Up ad russ; Standing  -     Chlorhexidine (CHG) 2% Wipes; Standing  -     Chlorohexidine Gluconate Bath; Standing  -     Place in Outpatient; Standing  -     Diet clear liquid; Standing  -     Place LINDA hose; Standing  -     Place sequential compression device; Standing    Other orders  -     IP VTE LOW RISK PATIENT; Standing         I have discussed the risks, benefits, indications, and alternatives of the procedure in detail.  The patient verbalizes her understanding.  All questions answered.  Consents signed.  The patient agrees to proceed to proceed as planned.

## 2018-05-31 NOTE — PROGRESS NOTES
CHANDRIKA Pre-op Exam      HPI : Holly Patel is a 27 y.o. female  female who presents for preop exam for a LEEP scheduled on Kandis 15,2018 for Moderate Cervical Dysplasia. She already has a liver transplant and is on the Kidney Transplant list for renal failure as well. She was recently hospitalized for pneumonia and possible sepsis, and is getting IV antibiotics at each time of her dialysis .     Past Medical History:   Diagnosis Date    Anemia in ESRD (end-stage renal disease) 10/12/2015    dialysis tues, thursday, sat; access left arm    Chronic rejection of liver transplant 3/22/2016    Depression     Encounter for blood transfusion     ESRD on hemodialysis 2015    History of splenomegaly 2016    Immunosuppressed 2017    Iron deficiency anemia secondary to inadequate dietary iron intake 2017    She receives IV iron periodically at the Dialysis Center.   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 ()    Moderate protein-calorie malnutrition 2017    MRSA bacteremia 2017    Prophylactic immunotherapy 2014    Renovascular hypertension 10/2/2015    Secondary hyperparathyroidism 2017    Seizures     Sialadenitis 3/21/2018    Thrombocytopenia 2016    Thrombocytopenia 2016     Past Surgical History:   Procedure Laterality Date     SECTION      x 2    LIVER BIOPSY      LIVER TRANSPLANT  1992    TUBAL LIGATION  2010     Family History   Problem Relation Age of Onset    Hypertension Mother     Hypertension Father     Melanoma Neg Hx     Breast cancer Neg Hx     Colon cancer Neg Hx     Ovarian cancer Neg Hx      OB History      Para Term  AB Living    2 2 0 2 0 2    SAB TAB Ectopic Multiple Live Births    0 0 0   2        Review of patient's allergies indicates:   Allergen Reactions    Chloral hydrate      Other reaction(s): Hallucinations  Other reaction(s): Hives    Hydrocodone  Other (See Comments)     Mental status changes       Current Outpatient Prescriptions:     aspirin 81 MG Chew, Take 1 tablet (81 mg total) by mouth once daily., Disp: , Rfl: 0    butalbital-acetaminophen-caffeine -40 mg (FIORICET, ESGIC) -40 mg per tablet, Take 1 tablet by mouth every 6 (six) hours as needed for Headaches., Disp: 15 tablet, Rfl: 0    cefazolin sodium (CEFAZOLIN 2G/20 ML SYRINGE, PYXIS,), Inject 20 mLs (2 g total) into the vein every Tues, Thurs, Sat. Last dose 6/9/2018, Disp: 100 mL, Rfl: 0    cinacalcet (SENSIPAR) 30 MG Tab, Take 1 tablet (30 mg total) by mouth daily with breakfast., Disp: 30 tablet, Rfl: 11    citalopram (CELEXA) 20 MG tablet, Take 1 tablet (20 mg total) by mouth once daily., Disp: 30 tablet, Rfl: 1    cloNIDine 0.2 mg/24 hr td ptwk (CATAPRES) 0.2 mg/24 hr, Place 1 patch onto the skin every 7 days. Change every Wednesday, Disp: 4 patch, Rfl: 11    famotidine (PEPCID) 20 MG tablet, Take 1 tablet (20 mg total) by mouth once daily., Disp: 20 tablet, Rfl: 0    food supplemt, lactose-reduced (ENSURE ACTIVE HIGH PROTEIN) Liqd, Take 236 mLs by mouth 2 (two) times daily., Disp: 60 Can, Rfl: 6    hydrALAZINE (APRESOLINE) 50 MG tablet, Take 1 tablet (50 mg total) by mouth every 8 (eight) hours., Disp: 90 tablet, Rfl: 5    labetalol (NORMODYNE) 200 MG tablet, Take 2 tablets (400 mg total) by mouth every 8 (eight) hours., Disp: 360 tablet, Rfl: 11    lacosamide (VIMPAT) 50 mg Tab, Take by mouth every 12 (twelve) hours., Disp: , Rfl:     levETIRAcetam (KEPPRA) 500 MG Tab, Take 500 mg by mouth 2 (two) times daily., Disp: , Rfl:     mycophenolate (CELLCEPT) 250 mg Cap, Take 1,000 mg by mouth 2 (two) times daily., Disp: , Rfl:     NIFEdipine (PROCARDIA-XL) 60 MG (OSM) 24 hr tablet, TAKE ONE(1) TABLET BY MOUTH EVERY DAY, Disp: , Rfl: 0    NIFEdipine (PROCARDIA-XL) 90 MG (OSM) 24 hr tablet, Take 1 tablet (90 mg total) by mouth once daily. (Patient taking differently: Take  60 mg by mouth once daily. ), Disp: 30 tablet, Rfl: 11    ondansetron (ZOFRAN) 4 MG tablet, Take 1 tablet (4 mg total) by mouth every 6 (six) hours as needed for Nausea., Disp: 15 tablet, Rfl: 0    predniSONE (DELTASONE) 1 MG tablet, Take 1 mg by mouth every other day., Disp: , Rfl:     tacrolimus (PROGRAF) 1 MG Cap, Take 7 capsules (7 mg total) by mouth every 12 (twelve) hours., Disp: 420 capsule, Rfl: 11    triamcinolone acetonide 0.1% (KENALOG) 0.1 % ointment, AAA on arms, legs, and neck bid x 1-2 wks then prn flares only (Patient taking differently: Apply to affected area(s) on arms, legs, and neck twice daily x 1-2 wks then as needed for flares only), Disp: 80 g, Rfl: 3  No current facility-administered medications for this visit.   Social History     Social History    Marital status: Legally      Spouse name: N/A    Number of children: N/A    Years of education: N/A     Occupational History    Not on file.     Social History Main Topics    Smoking status: Never Smoker    Smokeless tobacco: Never Used    Alcohol use No    Drug use: No    Sexual activity: No     Other Topics Concern    Are You Pregnant Or Think You May Be? No    Breast-Feeding No     Social History Narrative    Lives 2 kids, 7 and 8, and nephew. Her mom helps with kids.       Last pap March 2018: ASCUS, + HR HPV, not 16/18  Last pathology : Moderate Cervical Dysplasia  Last ultrasound N/A    BP (!) 130/58   Wt 48.4 kg (106 lb 11.2 oz)   LMP 04/30/2018 (Exact Date)   BMI 17.76 kg/m²     ROS:    GENERAL: Denies weight gain or weight loss. Feeling well overall.   SKIN: Denies rash or lesions.   HEAD: Denies head injury or headache.   NODES: Denies enlarged lymph nodes.   CHEST: Denies chest pain or shortness of breath.   CARDIOVASCULAR: Denies palpitations or left sided chest pain.   ABDOMEN: No abdominal pain, constipation, diarrhea, nausea, vomiting or rectal bleeding.   URINARY: No frequency, dysuria, hematuria, or  burning on urination.  REPRODUCTIVE: See HPI.   BREASTS: The patient performs breast self-examination and denies pain, lumps, or nipple discharge.   HEMATOLOGIC: No easy bruisability or excessive bleeding with the exception of menstrual cycles.  MUSCULOSKELETAL: Denies joint pain or swelling.   NEUROLOGIC: Denies syncope or weakness.   PSYCHIATRIC: Denies depression, anxiety or mood swings.    PHYSICAL EXAM:    APPEARANCE: Well nourished, well developed, in no acute distress.  AFFECT: WNL, alert and oriented x 3  SKIN: No acne or hirsutism  NECK: Neck symmetric without masses or thyromegaly  NODES: No inguinal, cervical, axillary, or femoral lymph node enlargement  CHEST: Good respiratory effect  ABDOMEN: Soft.  No tenderness or masses.  No hepatosplenomegaly.  No hernias.  PELVIC: Normal external genitalia without lesions.  Normal hair distribution.  Adequate perineal body, normal urethral meatus.  Vagina moist and well rugated without lesions or discharge.  Cervix pink, without lesions, discharge or tenderness.  No significant cystocele or rectocele.  Bimanual exam shows uterus to be normal size, regular, mobile and nontender.  Adnexa without masses or tenderness.    EXTREMITIES: No edema.    ASSESSMENT & PLAN:    Holly was seen today for pre-op exam.    Diagnoses and all orders for this visit:    Preoperative exam for gynecologic surgery  -     Vital signs; Standing  -     Up ad russ; Standing  -     Chlorhexidine (CHG) 2% Wipes; Standing  -     Chlorohexidine Gluconate Bath; Standing  -     Place in Outpatient; Standing  -     Diet clear liquid; Standing  -     Place LINDA hose; Standing  -     Place sequential compression device; Standing    Moderate cervical dysplasia  -     Vital signs; Standing  -     Up ad russ; Standing  -     Chlorhexidine (CHG) 2% Wipes; Standing  -     Chlorohexidine Gluconate Bath; Standing  -     Place in Outpatient; Standing  -     Diet clear liquid; Standing  -     Place LINDA hose;  Standing  -     Place sequential compression device; Standing    Other orders  -     IP VTE LOW RISK PATIENT; Standing         I have discussed the risks, benefits, indications, and alternatives of the procedure in detail.  The patient verbalizes her understanding.  All questions answered.  Consents signed.  The patient agrees to proceed to proceed as planned.

## 2018-05-31 NOTE — DISCHARGE SUMMARY
[facility] Hospital Medicine  Discharge Summary      Patient Name: Holly Patel  MRN: [id number]  Admission Date: 2018  Hospital Length of Stay: 4 days  Discharge Date and Time:  2018 7:03 PM  Attending Physician: Lorna att. providers found   Discharging Provider: Janeen Breen MD  Primary Care Provider: Stan Sosa MD      HPI:   Holly Patel is a 27 y.o. female that (in part)  has a past medical history of Anemia in ESRD (end-stage renal disease); Chronic rejection of liver transplant; Encounter for blood transfusion; ESRD on hemodialysis; History of splenomegaly; Immunosuppressed; Iron deficiency anemia secondary to inadequate dietary iron intake; Liver replaced by transplant; Moderate protein-calorie malnutrition; MRSA bacteremia; Prophylactic immunotherapy; Renovascular hypertension; Secondary hyperparathyroidism; Sialadenitis; Thrombocytopenia; and Thrombocytopenia.  has a past surgical history that includes Liver transplant (1992); Liver biopsy;  section; and Tubal ligation ().   Presents to [facility] Emergency Department complaining of fever beginning with acute onset at dialysis this morning.  Associated with generalized body aches and generalized fatigue for the last 3 days.  Occasional nonproductive cough.  Denies chest pain, abdominal pain, diarrhea, sore throat, ear pain, sinus pain, congestion, or other obvious infections or wounds.  Reports compliance with her home medication regimen.  Immunocompromised state due to liver transplant.  Denies recent travel or sick contacts, although she is a dialysis patient.    In the emergency department routine laboratory studies, chest x-ray, and cultures were obtained.  Portable x-ray was concerning for possible pneumonia which led to a PA and lateral demonstrating some equivocal evidence for right lower lobe pneumonia versus atelectasis.  Given the patient's immunocompromised  state and fever, empiric antibiotics were initiated and the patient is being admitted for further evaluation and treatment.    Hospital medicine has been asked to admit for further evaluation and treatment.       * No surgery found *      Hospital Course:   Ms. Patel was admitted with sepsis due to pneumonia and bacteremia in an immunocompromised patient. She was empirically treated with Cefepime and Vancomycin, and blood cultures were done. CXR was consistent with right infiltrate. Blood culture was remarkable for MSSA on 5/26. Nephrology was consulted for continued dialysis during hospital stay. Blood cultures 5/28 NGTD. Source of bacteremia likely dialysis access. Discharged on cefazolin after dialysis to complete 2 weeks of treatment for MSSA bacteremia.      Consults:   Consults         Status Ordering Provider     Inpatient consult to Nephrology  Once     Provider:  Jacquie Grover MD    Completed SHAMA QUEEN          * Sepsis    Pt met criteria for sepsis with fever, tachycardia due to bacteremia in immunocompromised host  Likely due to dialysis access  Started on empiric cefepime and vanc   Blood cultures with MSSA in 5/26  Repeat blood cultures 5/28 are NGTD- continue to follow  Discharged on cefazolin with dialysis x2 weeks         MSSA bacteremia    Repeat blood cultures 5/28 NGTD  Cefazolin x2 weeks with dialysis         Abnormal CXR    CXR with R sided infiltrate vs atelectasis in immunocompromised patient  Initially started on vanc and cefepime  CXR finding more likely due to volume than pneumonia         Fever    As discussed above        Thrombocytopenia    Chronically low and consistently with previous levels  No reported bleeding  Outpatient Hem/Onc workup in progress        Seizures    No seizure activity   Continue levetiracetam        Elevated troponin    Trended markers and they are flat  No reported chest pain  Due to ESRD with chronically elevated levels  Not consistent with ACS         Moderate protein-calorie malnutrition    Chronic, will supplement with Nepro        Immunosuppressed    Due to transplant as discussed above        ESRD (end stage renal disease) on dialysis    Nephrology consulted for dialysis        Anemia in ESRD (end-stage renal disease)    H/H overall stable and consistent with previous levels  No reported bleeding  Will monitor but no need for transfusion        Renovascular hypertension    Continue home medication regimen.        Liver replaced by transplant    Hemangioendothelioma s/p liver transplant in 1992  Continue home medications  Follow up with liver transplant           Final Active Diagnoses:    Diagnosis Date Noted POA    PRINCIPAL PROBLEM:  Sepsis [A41.9] 04/12/2016 Yes    MSSA bacteremia [R78.81] 05/28/2018 Yes    Abnormal CXR [R93.8] 05/27/2018 Yes    Fever [R50.9] 05/26/2018 Yes    Thrombocytopenia [D69.6] 05/16/2018 Yes    Seizures [R56.9]  Yes    Elevated troponin [R74.8] 02/16/2018 Yes    Moderate protein-calorie malnutrition [E44.0] 08/16/2017 Yes    Immunosuppressed [D89.9] 08/05/2017 Yes     Chronic    ESRD (end stage renal disease) on dialysis [N18.6, Z99.2]  Not Applicable    Anemia in ESRD (end-stage renal disease) [N18.6, D63.1] 10/12/2015 Yes     Chronic    Renovascular hypertension [I15.0] 10/02/2015 Yes     Chronic   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 [Z94.4] 09/10/2012 Not Applicable     Chronic      Problems Resolved During this Admission:    Diagnosis Date Noted Date Resolved POA       Discharged Condition: good    Disposition: Home or Self Care    Follow Up:  Follow-up Information     Stna Sosa MD On 6/18/2018.    Specialty:  Internal Medicine  Why:  Monday at 10am. Hospital Follow up appointment with ROXIE Villanueva   Contact information:  [address] [facility] [address]  [phone]             [facility].    Why:  Dialysis: Tuesday, Thursday and Saturday with IV ABX.   Contact  information:  [address]  [facility] [address]               Patient Instructions:     Diet renal     Activity as tolerated     Notify your health care provider if you experience any of the following:  temperature >100.4     Notify your health care provider if you experience any of the following:  persistent nausea and vomiting or diarrhea     Notify your health care provider if you experience any of the following:  severe uncontrolled pain     Notify your health care provider if you experience any of the following:  redness, tenderness, or signs of infection (pain, swelling, redness, odor or green/yellow discharge around incision site)     Notify your health care provider if you experience any of the following:  difficulty breathing or increased cough     Notify your health care provider if you experience any of the following:  severe persistent headache     Notify your health care provider if you experience any of the following:  worsening rash     Notify your health care provider if you experience any of the following:  persistent dizziness, light-headedness, or visual disturbances     Notify your health care provider if you experience any of the following:  increased confusion or weakness         Significant Diagnostic Studies: Labs: All labs within the past 24 hours have been reviewed    Pending Diagnostic Studies:     None         Medications:  Reconciled Home Medications:      Medication List      START taking these medications    CEFAZOLIN 2G/20 ML SYRINGE (PYXIS)  Inject 20 mLs (2 g total) into the vein every Tues, Thurs, Sat. Last dose 6/9/2018  Start taking on:  5/31/2018        CHANGE how you take these medications    famotidine 20 MG tablet  Commonly known as:  PEPCID  Take 1 tablet (20 mg total) by mouth once daily.  What changed:  when to take this     NIFEdipine 90 MG (OSM) 24 hr tablet  Commonly known as:  PROCARDIA-XL  Take 1 tablet (90 mg total) by mouth once daily.  What  changed:  how much to take     ondansetron 4 MG tablet  Commonly known as:  ZOFRAN  Take 1 tablet (4 mg total) by mouth every 6 (six) hours as needed for Nausea.  What changed:  Another medication with the same name was removed. Continue taking this medication, and follow the directions you see here.     triamcinolone acetonide 0.1% 0.1 % ointment  Commonly known as:  KENALOG  AAA on arms, legs, and neck bid x 1-2 wks then prn flares only  What changed:  additional instructions        CONTINUE taking these medications    aspirin 81 MG Chew  Take 1 tablet (81 mg total) by mouth once daily.     butalbital-acetaminophen-caffeine -40 mg -40 mg per tablet  Commonly known as:  FIORICET, ESGIC  Take 1 tablet by mouth every 6 (six) hours as needed for Headaches.     cinacalcet 30 MG Tab  Commonly known as:  SENSIPAR  Take 1 tablet (30 mg total) by mouth daily with breakfast.     citalopram 20 MG tablet  Commonly known as:  CELEXA  Take 1 tablet (20 mg total) by mouth once daily.     cloNIDine 0.2 mg/24 hr td ptwk 0.2 mg/24 hr  Commonly known as:  CATAPRES  Place 1 patch onto the skin every 7 days. Change every Wednesday     food supplemt, lactose-reduced Liqd  Commonly known as:  ENSURE ACTIVE HIGH PROTEIN  Take 236 mLs by mouth 2 (two) times daily.     hydrALAZINE 50 MG tablet  Commonly known as:  APRESOLINE  Take 1 tablet (50 mg total) by mouth every 8 (eight) hours.     labetalol 200 MG tablet  Commonly known as:  NORMODYNE  Take 2 tablets (400 mg total) by mouth every 8 (eight) hours.     lacosamide 50 mg Tab  Commonly known as:  VIMPAT  Take by mouth every 12 (twelve) hours.     levETIRAcetam 500 MG Tab  Commonly known as:  KEPPRA  Take 500 mg by mouth 2 (two) times daily.     mycophenolate 250 mg Cap  Commonly known as:  CELLCEPT  Take 1,000 mg by mouth 2 (two) times daily.     predniSONE 1 MG tablet  Commonly known as:  DELTASONE  Take 1 mg by mouth every other day.     tacrolimus 1 MG Cap  Commonly known  as:  PROGRAF  Take 7 capsules (7 mg total) by mouth every 12 (twelve) hours.            Indwelling Lines/Drains at time of discharge:   Lines/Drains/Airways     Drain                 Hemodialysis AV Fistula Left forearm -- days                Time spent on the discharge of patient: 40 minutes  Patient was seen and examined on the date of discharge and determined to be suitable for discharge.         Janeen Breen MD  Department of Hospital Medicine  [facility]

## 2018-05-31 NOTE — ANESTHESIA PREPROCEDURE EVALUATION
05/31/2018  Holly Patel is a 27 y.o., female.    Anesthesia Evaluation    I have reviewed the Patient Summary Reports.    I have reviewed the Nursing Notes.   I have reviewed the Medications.     Review of Systems  Anesthesia Hx:  Denies Family Hx of Anesthesia complications.   Denies Personal Hx of Anesthesia complications.   Social:  Non-Smoker    Hematology/Oncology:     Oncology Normal    -- Anemia: Hematology Comments: Pancytopenia.  Will check CBC on morning of surgery.  Pt may need blood products (platelets).    Cardiovascular:   Exercise tolerance: good Hypertension    Pulmonary:   Pneumonia Recently hospitalized with pneumonia and sepsis. D/C'd 5/30.  On IV antibiotics during dialysis. Pt denies, cough, SOB, or chest pain.  Surgery on 6/15/18   Renal/:   Chronic Renal Disease, ESRD, Dialysis    Hepatic/GI:   Liver Disease, (s/p liver transplant. Presently with chronic rejection.  )    Neurological:   Seizures, well controlled    Endocrine:   Secondary hyperparathyroidism   Psych:   depression          Physical Exam  General:  Cachexia    Airway/Jaw/Neck:  Airway Findings: Mouth Opening: Normal Tongue: Normal  General Airway Assessment: Adult, Possible difficult intubation  Mallampati: II  TM Distance: 4 - 6 cm      Dental:  Dental Findings: In tact   Chest/Lungs:  Chest/Lungs Findings: Rhonchi         Mental Status:  Mental Status Findings:  Alert and Oriented, Cooperative         Anesthesia Plan  Type of Anesthesia, risks & benefits discussed:  Anesthesia Type:  general  Patient's Preference:   Intra-op Monitoring Plan: standard ASA monitors  Intra-op Monitoring Plan Comments:   Post Op Pain Control Plan: multimodal analgesia  Post Op Pain Control Plan Comments:   Induction:   IV  Beta Blocker:         Informed Consent: Patient understands risks and agrees with Anesthesia plan.   Questions answered. Anesthesia consent signed with patient.  ASA Score: 4     Day of Surgery Review of History & Physical:    H&P update referred to the surgeon.     Anesthesia Plan Notes: D/W Dr. Marroquin pt's present state of health.  Pt may require platelets or PRBC's.  If she continues to improve since d/c from recent hospital stay, Dr. Marroquin would like to proceed with surgery given pt's dysplasia and immunosuppression.  Will order day of surgery labs.        Ready For Surgery From Anesthesia Perspective.

## 2018-06-01 ENCOUNTER — PATIENT OUTREACH (OUTPATIENT)
Dept: ADMINISTRATIVE | Facility: CLINIC | Age: 28
End: 2018-06-01

## 2018-06-01 NOTE — PROGRESS NOTES
C3 nurse attempted to contact patient. No answer. The following message was left for the patient to return the call:  Good AFTERNOON  I am a nurse calling on behalf of [facility] from the [facility]ination [facility].  This is a Transitional Care Call for Holly Patel . When you have a moment please contact us at [phone] or [phone] Monday through Friday, between the hours of 8 am to 4 pm. We look forward to speaking with you. On behalf of [facility] have a nice day.    The patient has a scheduled HOSFU appointment with SAM SOLORZANO on 6/18 @ 1000. Message sent to Physician staff.

## 2018-06-01 NOTE — PROGRESS NOTES
C3 nurse attempted to contact patient. The following occurred:   C3 nurse attempted to contact Holly Patel  for a TCC post hospital discharge follow up call. The patient is unable to conduct the call @ this time. The patient requested a callback.    The patient has a scheduled HOSFU appointment with SAM SOLORZANO on 6/18 @ 1000. Message sent to Physician staff.

## 2018-06-01 NOTE — PATIENT INSTRUCTIONS
Sepsis  Sepsis occurs when your body responds to bacteremia - the presence of bacteria in your bloodstream. Sepsis can be deadly. Blood pressure may drop and the lungs and kidneys may start to fail. Emergency care for sepsis is crucial.  Risk Factors  Those most at risk for sepsis are:  Infants or older adults  People who have an illness such as cancer, AIDS, or diabetes  People being treated with chemotherapy medications or radiation  People who have had a transplant  People with an infection such as pneumonia, meningitis, or a urinary tract infection  When to Go to the Emergency Department (ED)  Sepsis is a medical emergency. Go to the nearest emergency department if a fever is present with any of these symptoms:  Chills and shaking  Fever or low body temperature (hypothermia)  Rapid heartbeat and breathing; shortness of breath  Nausea or vomiting  Confusion, dizziness  Skin rash  Decreased urination  What to Expect in the ED  Blood and urine tests are done to look for the presence of bacteria.  A blood culture may be done. In this test, a blood sample is sent to a lab, where its placed in a special container. Any bacteria in the blood should grow in 24 hours.  X-rays may be taken or other imaging tests may be done.  A person with sepsis will be admitted to the hospital and treated with antibiotics. Treatment may also include oxygen and intravenous fluids.  © 3650-5369 [facility], [address] [place] [address], [place] [address]. All rights reserved. This information is not intended as a substitute for professional medical care. Always follow your healthcare professional's instructions.

## 2018-06-01 NOTE — Clinical Note
Please forward this important TCC information to your provider in order to maximize the post discharge care delivery of this patient.  C3 nurse attempted to contact Paragtasia Randolph Patel  for a TCC post hospital discharge follow up call. No answer. C3 nurse left a voice message and OOC # given. The patient has a scheduled HOSFU appointment with SAM SOLORZANO  on 6/18 @ 1000.  Respectfully, Pippa Doty RN  [facility]  [email or web address]  Please do not reply to this message, as this inbox is not routinely monitored.

## 2018-06-02 LAB
BACTERIA BLD CULT: NORMAL
BACTERIA BLD CULT: NORMAL

## 2018-06-07 ENCOUNTER — OFFICE VISIT (OUTPATIENT)
Dept: HEMATOLOGY/ONCOLOGY | Facility: CLINIC | Age: 28
End: 2018-06-07
Payer: MEDICARE

## 2018-06-07 VITALS
TEMPERATURE: 98 F | DIASTOLIC BLOOD PRESSURE: 112 MMHG | HEART RATE: 87 BPM | HEIGHT: 65 IN | RESPIRATION RATE: 18 BRPM | OXYGEN SATURATION: 100 % | WEIGHT: 109.81 LBS | SYSTOLIC BLOOD PRESSURE: 177 MMHG | BODY MASS INDEX: 18.3 KG/M2

## 2018-06-07 DIAGNOSIS — D53.9 MACROCYTIC ANEMIA: ICD-10-CM

## 2018-06-07 DIAGNOSIS — D63.1 ANEMIA IN ESRD (END-STAGE RENAL DISEASE): Chronic | ICD-10-CM

## 2018-06-07 DIAGNOSIS — Z29.89 PROPHYLACTIC IMMUNOTHERAPY: ICD-10-CM

## 2018-06-07 DIAGNOSIS — N18.6 ANEMIA IN ESRD (END-STAGE RENAL DISEASE): Chronic | ICD-10-CM

## 2018-06-07 DIAGNOSIS — D69.6 THROMBOCYTOPENIA: ICD-10-CM

## 2018-06-07 DIAGNOSIS — D64.9 ANEMIA OF UNKNOWN ETIOLOGY: ICD-10-CM

## 2018-06-07 DIAGNOSIS — Z94.4 LIVER REPLACED BY TRANSPLANT: Chronic | ICD-10-CM

## 2018-06-07 DIAGNOSIS — R50.9 FEVER, UNSPECIFIED FEVER CAUSE: Primary | ICD-10-CM

## 2018-06-07 PROCEDURE — 99214 OFFICE O/P EST MOD 30 MIN: CPT | Mod: PBBFAC | Performed by: INTERNAL MEDICINE

## 2018-06-07 PROCEDURE — 99999 PR PBB SHADOW E&M-EST. PATIENT-LVL IV: CPT | Mod: PBBFAC,,, | Performed by: INTERNAL MEDICINE

## 2018-06-07 PROCEDURE — 99214 OFFICE O/P EST MOD 30 MIN: CPT | Mod: S$PBB,,, | Performed by: INTERNAL MEDICINE

## 2018-06-07 NOTE — PROGRESS NOTES
CC: Low platelets, anemia, follow up     HPI Holly Patel is a 27 y.o. female with secondary hypertension, h/o liver transplant in 1992, ESRD on HD via L RC AVF placed July 2015. She is here for follow up for low platelets. Denies any overt bleeding from GI/ tracts or nose. No hematuria. No vaginal bleeding or spotting. No recent medication changes or recent travel. She reports losing 30 lbs since early 2018, unintentionally. Her appetite is not 'good'. No fever or night sweats.  Platelet count was normal until Nov 2014. It has declined since then. It ranged between 79k -155 k in 2015, 64k- 122k in 2016, 50k -113k in 2017.   Most recent platelet count is 101kon  5/31/18. She also has significant anemia, with most recent hemoglobin of 7.1. She has mild leukopenia as well.     Interval history: She was hospitalized for pneumonia on 5/26/18. She had bacteremia. She is on antibiotics. No bleeding.        Review of Systems   Constitutional: Positive for malaise/fatigue. Negative for chills, fever and weight loss.   HENT: Negative.    Eyes: Negative.    Respiratory: Negative.    Cardiovascular: Negative.    Gastrointestinal: Negative.    Genitourinary: Negative.    Musculoskeletal: Negative.    Skin: Negative.    Neurological: Negative.    Endo/Heme/Allergies: Negative.    Psychiatric/Behavioral: Negative.      Current Outpatient Prescriptions   Medication Sig    aspirin 81 MG Chew Take 1 tablet (81 mg total) by mouth once daily.    cefazolin sodium (CEFAZOLIN 2G/20 ML SYRINGE, PYXIS,) Inject 20 mLs (2 g total) into the vein every Tues, Thurs, Sat. Last dose 6/9/2018    cinacalcet (SENSIPAR) 30 MG Tab Take 1 tablet (30 mg total) by mouth daily with breakfast.    citalopram (CELEXA) 20 MG tablet Take 1 tablet (20 mg total) by mouth once daily.    cloNIDine 0.2 mg/24 hr td ptwk (CATAPRES) 0.2 mg/24 hr Place 1 patch onto the skin every 7 days. Change every Wednesday    famotidine (PEPCID) 20 MG tablet  Take 1 tablet (20 mg total) by mouth once daily.    food supplemt, lactose-reduced (ENSURE ACTIVE HIGH PROTEIN) Liqd Take 236 mLs by mouth 2 (two) times daily.    hydrALAZINE (APRESOLINE) 50 MG tablet Take 1 tablet (50 mg total) by mouth every 8 (eight) hours.    labetalol (NORMODYNE) 200 MG tablet Take 2 tablets (400 mg total) by mouth every 8 (eight) hours.    lacosamide (VIMPAT) 50 mg Tab Take by mouth every 12 (twelve) hours.    levETIRAcetam (KEPPRA) 500 MG Tab Take 500 mg by mouth 2 (two) times daily.    mycophenolate (CELLCEPT) 250 mg Cap Take 1,000 mg by mouth 2 (two) times daily.    NIFEdipine (PROCARDIA-XL) 60 MG (OSM) 24 hr tablet TAKE ONE(1) TABLET BY MOUTH EVERY DAY    NIFEdipine (PROCARDIA-XL) 90 MG (OSM) 24 hr tablet Take 1 tablet (90 mg total) by mouth once daily. (Patient taking differently: Take 60 mg by mouth once daily. )    ondansetron (ZOFRAN) 4 MG tablet Take 1 tablet (4 mg total) by mouth every 6 (six) hours as needed for Nausea.    predniSONE (DELTASONE) 1 MG tablet Take 1 mg by mouth every other day.    tacrolimus (PROGRAF) 1 MG Cap Take 7 capsules (7 mg total) by mouth every 12 (twelve) hours.    triamcinolone acetonide 0.1% (KENALOG) 0.1 % ointment AAA on arms, legs, and neck bid x 1-2 wks then prn flares only (Patient taking differently: Apply to affected area(s) on arms, legs, and neck twice daily x 1-2 wks then as needed for flares only)     No current facility-administered medications for this visit.        Vitals:    06/07/18 1545   BP: (!) 177/112   Pulse: 87   Resp: 18   Temp: 98.1 °F (36.7 °C)     Physical Exam   Constitutional: She is oriented to person, place, and time. She appears well-developed. No distress.   HENT:   Head: Normocephalic and atraumatic.   Mouth/Throat: No oropharyngeal exudate.   Eyes: Pupils are equal, round, and reactive to light.   Neck: Normal range of motion.   Cardiovascular: Normal rate and regular rhythm.    Murmur heard.  Pulmonary/Chest:  Effort normal and breath sounds normal. No respiratory distress. She has no wheezes.   Abdominal: She exhibits no distension. There is no tenderness. There is no rebound.   Musculoskeletal: She exhibits no edema.   Lymphadenopathy:     She has no cervical adenopathy.   Neurological: She is alert and oriented to person, place, and time. No cranial nerve deficit.   Skin: Skin is warm and dry.   She has AV fistula in left forearm   Psychiatric: She has a normal mood and affect.       Component      Latest Ref Rng & Units 5/30/2018 5/21/2018 5/16/2018   WBC      3.90 - 12.70 K/uL 5.14     RBC      4.00 - 5.40 M/uL 2.82 (L)     Hemoglobin      12.0 - 16.0 g/dL 7.7 (L)     Hematocrit      37.0 - 48.5 % 23.4 (L)     MCV      82 - 98 fL 83     MCH      27.0 - 31.0 pg 27.3     MCHC      32.0 - 36.0 g/dL 32.9     RDW      11.5 - 14.5 % 17.5 (H)     Platelets      150 - 350 K/uL 101 (L)     MPV      9.2 - 12.9 fL 9.6     Gran # (ANC)      1.8 - 7.7 K/uL 3.4     Lymph #      1.0 - 4.8 K/uL 0.8 (L)     Mono #      0.3 - 1.0 K/uL 0.4     Eos #      0.0 - 0.5 K/uL 0.5     Baso #      0.00 - 0.20 K/uL 0.01     Gran%      38.0 - 73.0 % 65.1     Lymph%      18.0 - 48.0 % 16.0 (L)     Mono%      4.0 - 15.0 % 7.6     Eosinophil%      0.0 - 8.0 % 10.1 (H)     Basophil%      0.0 - 1.9 % 0.2     Differential Method       Automated     Sodium      136 - 145 mmol/L 136     Potassium      3.5 - 5.1 mmol/L 3.8     Chloride      95 - 110 mmol/L 104     CO2      23 - 29 mmol/L 24     Glucose      70 - 110 mg/dL 98     BUN, Bld      6 - 20 mg/dL 17     Creatinine      0.5 - 1.4 mg/dL 4.1 (H)     Calcium      8.7 - 10.5 mg/dL 8.4 (L)     Total Protein      6.0 - 8.4 g/dL 7.0     Albumin      3.5 - 5.2 g/dL 2.2 (L)     Total Bilirubin      0.1 - 1.0 mg/dL 0.4     Alkaline Phosphatase      55 - 135 U/L 465 (H)     AST      10 - 40 U/L 32     ALT      10 - 44 U/L 30     Anion Gap      8 - 16 mmol/L 8     eGFR if African American      >60 mL/min/1.73  m:2 16 (A)     eGFR if non African American      >60 mL/min/1.73 m:2 14 (A)     Protein, Serum      6.0 - 8.4 g/dL   7.8   Albumin grams/dl      3.35 - 5.55 g/dL   3.45   Alpha-1 grams/dl      0.17 - 0.41 g/dL   0.43 (H)   Alpha-2 grams/dl      0.43 - 0.99 g/dL   0.69   Beta grams/dl      0.50 - 1.10 g/dL   0.80   Gamma grams/dl      0.67 - 1.58 g/dL   2.43 (H)   EBV VCA IgG      <18.0 U/mL   204.0 (H)   EBV VCA IgM      <36.0 U/mL   36.0 (H)   EBV Early Antigen Ab, IgG      <9.0 U/mL   124.0 (H)   EBV Nuclear Ag Ab      <18.0 U/mL   66.0 (H)   Immunofix Interp.         SEE COMMENT   CMV IgG Interpretation         Reactive (A)   EBV DNA, PCR      Undetected IU/mL  Undetected      5/16/18 serum ABDIEL: No monoclonal peaks identified  5/16/18 SPEP: Normal total protein. Increased gamma globulin, polyclonal. No paraprotein bands identified.     Component      Latest Ref Rng & Units 5/16/2018   Iron      30 - 160 ug/dL 54   Transferrin      200 - 375 mg/dL 197 (L)   TIBC      250 - 450 ug/dL 292   Saturated Iron      20 - 50 % 18 (L)   IgG - Serum      650 - 1600 mg/dL 2765 (H)   IgA      40 - 350 mg/dL 335   IgM      50 - 300 mg/dL 99   Kappa Free Light Chains      0.33 - 1.94 mg/dL 91.96 (H)   Lambda Free Light Chains      0.57 - 2.63 mg/dL 49.53 (H)   Kappa/Lambda FLC Ratio      0.26 - 1.65 1.86 (H)   Fibrinogen      182 - 366 mg/dL 347   Uric Acid      2.4 - 5.7 mg/dL 4.7   LD      110 - 260 U/L 165   Retic      0.5 - 2.5 % 3.6 (H)   Haptoglobin      30 - 250 mg/dL 122   Ferritin      20.0 - 300.0 ng/mL 1,062 (H)   Folate      4.0 - 24.0 ng/mL 6.1   Vitamin B-12      210 - 950 pg/mL 741   TSH      0.400 - 4.000 uIU/mL 3.016   Free T4      0.71 - 1.51 ng/dL 1.31       Assessment:     1. Thrombocytopenia  2. Leukopenia  3. Anemia  4. ESRD on HD  5. S/p liver transplant  6. Long term immunosuppression        Plan:     1. Platelet count was normal until Nov 2014. It has declined since then. It ranged between 79k -155 k in  2015, 64k- 122k in 2016, 50k -113k in 2017. Most recent platelet count is 89k on  5/3/18. No bleeding diathesis. HBV, HCV serologies negative in 2016.   HIV negative in 2015. FELIX negative in 2015. No recent medication changes. She has been on Prograf since 1992.  B12, LDH, folate, TFT, haptoglobin all normal in May 2018. EBV DNA by PCR not detected. CMV Igg positive. Serum quantitative immunoglobulins showed elevated IgG. SPEP/TAYLOR normal. sFLC ratio slightly elevated.  Unlikely to be MAHA/TTP/HUS, although Tacrolimus is known to be associated with TTP.She has lost 30lb weight since early 2015. CT done in January 2018 did not reveal any mass/adenopathy. However, it was a non-contrast study.  She has chronic splenomegaly, which can also be contributing to cytopenias due to sequestration. Fibrinogen is normal. PTT, INR not permitted to be ordered. Her latest platelet count (on 5/16) was [id number].it is likely that the true count is higher, in view of splenic sequestration.      Most surgeries can be done safely at platelet count> [id number], as long as other coagulation parameters are normal. She will follow here in 2 months.         2. ANC 2.3 today. Will check EBV and CMV serologies, B12, folate, TFT, SPEP.      3. Possibly multi-factorial--medication ( Prograf), ESRD, nutritional. SPEP/taylor did not show monoclonal protein. TFT, B12, folate, LDH were normal. Reticulocyte count was high. Haptoglobin normal. T bili normal. Unlikely to be MAHA. She has very high BP and is not a candidate for Procrit/Aranesp                 Distress Screening Results: Psychosocial Distress screening score of Distress Score: 0 noted and reviewed. No intervention indicated.

## 2018-06-08 ENCOUNTER — HOSPITAL ENCOUNTER (EMERGENCY)
Facility: HOSPITAL | Age: 28
Discharge: HOME | End: 2018-06-09
Attending: EMERGENCY MEDICINE
Payer: MEDICARE

## 2018-06-08 DIAGNOSIS — I10 ESSENTIAL HYPERTENSION: Primary | ICD-10-CM

## 2018-06-08 DIAGNOSIS — J32.0 MAXILLARY SINUSITIS, UNSPECIFIED CHRONICITY: ICD-10-CM

## 2018-06-08 LAB
B-HCG UR QL: NEGATIVE
CTP QC/QA: YES

## 2018-06-08 PROCEDURE — 96372 THER/PROPH/DIAG INJ SC/IM: CPT

## 2018-06-08 PROCEDURE — 99284 EMERGENCY DEPT VISIT MOD MDM: CPT | Mod: 25

## 2018-06-08 PROCEDURE — 81025 URINE PREGNANCY TEST: CPT | Performed by: EMERGENCY MEDICINE

## 2018-06-09 VITALS
SYSTOLIC BLOOD PRESSURE: 178 MMHG | WEIGHT: 112 LBS | TEMPERATURE: 98 F | DIASTOLIC BLOOD PRESSURE: 113 MMHG | RESPIRATION RATE: 16 BRPM | HEART RATE: 68 BPM | BODY MASS INDEX: 18.66 KG/M2 | HEIGHT: 65 IN | OXYGEN SATURATION: 99 %

## 2018-06-09 PROCEDURE — 63600175 PHARM REV CODE 636 W HCPCS: Performed by: EMERGENCY MEDICINE

## 2018-06-09 PROCEDURE — 25000003 PHARM REV CODE 250: Performed by: EMERGENCY MEDICINE

## 2018-06-09 RX ORDER — CEFTRIAXONE 1 G/1
1 INJECTION, POWDER, FOR SOLUTION INTRAMUSCULAR; INTRAVENOUS
Status: COMPLETED | OUTPATIENT
Start: 2018-06-09 | End: 2018-06-09

## 2018-06-09 RX ORDER — BUTALBITAL, ACETAMINOPHEN AND CAFFEINE 50; 325; 40 MG/1; MG/1; MG/1
1 TABLET ORAL
Status: COMPLETED | OUTPATIENT
Start: 2018-06-09 | End: 2018-06-09

## 2018-06-09 RX ORDER — MONTELUKAST SODIUM 10 MG/1
10 TABLET ORAL NIGHTLY
Qty: 30 TABLET | Refills: 0 | Status: ON HOLD | OUTPATIENT
Start: 2018-06-09 | End: 2018-07-10 | Stop reason: HOSPADM

## 2018-06-09 RX ORDER — HYDRALAZINE HYDROCHLORIDE 25 MG/1
50 TABLET, FILM COATED ORAL
Status: COMPLETED | OUTPATIENT
Start: 2018-06-09 | End: 2018-06-09

## 2018-06-09 RX ORDER — FLUTICASONE PROPIONATE 50 MCG
2 SPRAY, SUSPENSION (ML) NASAL DAILY
Qty: 16 G | Refills: 0 | Status: SHIPPED | OUTPATIENT
Start: 2018-06-09 | End: 2018-06-19

## 2018-06-09 RX ORDER — LORATADINE 10 MG/1
10 TABLET ORAL DAILY
Qty: 30 TABLET | Refills: 0 | Status: ON HOLD | OUTPATIENT
Start: 2018-06-09 | End: 2018-07-26 | Stop reason: CLARIF

## 2018-06-09 RX ORDER — CLONIDINE HYDROCHLORIDE 0.1 MG/1
0.2 TABLET ORAL
Status: COMPLETED | OUTPATIENT
Start: 2018-06-09 | End: 2018-06-09

## 2018-06-09 RX ADMIN — CLONIDINE HYDROCHLORIDE 0.2 MG: 0.1 TABLET ORAL at 01:06

## 2018-06-09 RX ADMIN — CEFTRIAXONE SODIUM 1 G: 1 INJECTION, POWDER, FOR SOLUTION INTRAMUSCULAR; INTRAVENOUS at 01:06

## 2018-06-09 RX ADMIN — BUTALBITAL, ACETAMINOPHEN AND CAFFEINE 1 TABLET: 50; 325; 40 TABLET ORAL at 12:06

## 2018-06-09 RX ADMIN — HYDRALAZINE HYDROCHLORIDE 50 MG: 25 TABLET, FILM COATED ORAL at 01:06

## 2018-06-09 NOTE — ED PROVIDER NOTES
Encounter Date: 2018       History     Chief Complaint   Patient presents with    Dental Pain     pt c/o L side tooth pain x 2 day     27 y.o. Female with multiple medical problems presents to ED c/o acute,  left-sided dental pain that radiates to the entire left side of face and ear that began yesterday. She reports associated left sided pounding headache that began this morning. She reports taking Tylenol without improvement. She denies neck stiffness, photophobia, fever, NV or vision disturbance.  Currently taking antibiotics: cefazolin with HD //Sat since being discharged from hospital with sepsis due to pneumonia on 18       The history is provided by the patient.     Review of patient's allergies indicates:   Allergen Reactions    Chloral hydrate      Other reaction(s): Hallucinations  Other reaction(s): Hives    Hydrocodone Other (See Comments)     Mental status changes     Past Medical History:   Diagnosis Date    Anemia in ESRD (end-stage renal disease) 10/12/2015    dialysis tues, thursday, sat; access left arm    Chronic rejection of liver transplant 3/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 ()    Moderate protein-calorie malnutrition 2017    MRSA bacteremia 2017    Pneumonia     Prophylactic immunotherapy 2014    Renovascular hypertension 10/2/2015    Secondary hyperparathyroidism 2017    Seizures     Sialadenitis 3/21/2018    Thrombocytopenia 2016    Thrombocytopenia 2016     Past Surgical History:   Procedure Laterality Date     SECTION      x  2    LIVER BIOPSY      LIVER TRANSPLANT  09/1992    TUBAL LIGATION  2010     Family History   Problem Relation Age of Onset    Hypertension Mother     Hypertension Father     Melanoma Neg Hx     Breast cancer Neg Hx     Colon cancer Neg Hx     Ovarian cancer Neg Hx      Social History   Substance Use Topics    Smoking status: Never Smoker    Smokeless tobacco: Never Used    Alcohol use No     Review of Systems   Constitutional: Negative for appetite change, chills and fever.   HENT: Positive for dental problem. Negative for congestion, facial swelling, postnasal drip, sinus pressure, sneezing, sore throat, trouble swallowing and voice change.    Eyes: Negative.    Respiratory: Negative for cough and shortness of breath.    Cardiovascular: Negative for chest pain and palpitations.   Gastrointestinal: Negative for nausea and vomiting.   Genitourinary: Negative for dysuria and hematuria.   Musculoskeletal: Negative.    Skin: Negative.    Neurological: Negative for dizziness and headaches.   Psychiatric/Behavioral: Negative.    All other systems reviewed and are negative.      Physical Exam     Initial Vitals [06/08/18 2235]   BP Pulse Resp Temp SpO2   (!) 186/135 93 18 98.3 °F (36.8 °C) 99 %      MAP       152         Physical Exam    Nursing note and vitals reviewed.  Constitutional: She appears well-developed and well-nourished. She is not diaphoretic. No distress.   HENT:   Head: Normocephalic and atraumatic.   Mouth/Throat: Uvula is midline, oropharynx is clear and moist and mucous membranes are normal. No trismus in the jaw. No dental abscesses or uvula swelling. No oropharyngeal exudate, posterior oropharyngeal edema or posterior oropharyngeal erythema.       Eyes: Conjunctivae are normal. Pupils are equal, round, and reactive to light.   Neck: Normal range of motion and phonation normal. Neck supple. No stridor present.   Cardiovascular: Normal rate and intact distal pulses.   Pulmonary/Chest: No  accessory muscle usage. No tachypnea. No respiratory distress.   Abdominal: She exhibits no distension. There is no tenderness.   Musculoskeletal: Normal range of motion. She exhibits no tenderness.   Lymphadenopathy:        Head (left side): Submandibular adenopathy present.   Neurological: She is alert and oriented to person, place, and time. She has normal strength. Gait normal. GCS eye subscore is 4. GCS verbal subscore is 5. GCS motor subscore is 6.   Skin: Skin is warm and intact. Capillary refill takes less than 2 seconds.   Psychiatric: She has a normal mood and affect.         ED Course   Procedures  Labs Reviewed   POCT URINE PREGNANCY          CT Maxillofacial Without Contrast   Final Result      No acute facial fractures or significant abnormalities identified.         Electronically signed by: Kenny Bond MD   Date:    06/09/2018   Time:    00:43                             ED Course as of Jun 09 0429   Sat Jun 09, 2018   0129 Headache resolved with meds given  [DL]      ED Course User Index  [DL] Álvaro Caba MD      Labs Reviewed  Admission on 06/08/2018, Discharged on 06/09/2018   Component Date Value Ref Range Status    POC Preg Test, Ur 06/08/2018 Negative  Negative Final     Acceptable 06/08/2018 Yes   Final        Imaging Reviewed    Imaging Results          CT Maxillofacial Without Contrast (Final result)  Result time 06/09/18 00:43:32    Final result by Kenny Bond MD (06/09/18 00:43:32)                 Impression:      No acute facial fractures or significant abnormalities identified.      Electronically signed by: Kenny Bond MD  Date:    06/09/2018  Time:    00:43             Narrative:    EXAMINATION:  CT MAXILLOFACIAL WITHOUT CONTRAST    CLINICAL HISTORY:  facial swelling;    TECHNIQUE:  Low dose axial images, sagittal and coronal reformations were obtained through the face.  Contrast was not administered.    COMPARISON:  CT head 05/01/2018.    FINDINGS:  No  acute facial fractures are identified.  Visualized upper cervical spine shows no significant abnormalities.  There is mild left maxillary sinus disease.  Remaining visualized paranasal sinuses and mastoid air cells are clear.  No air-fluid levels are seen.  Orbits are grossly normal in appearance.  Visualized portion of the brain shows no significant abnormalities.  Parotid glands and submandibular glands are normal and symmetric in size.  Prominent submental lymph nodes are seen on the right.  No focal fluid collections are seen.                                Medications given in ED    Medications   butalbital-acetaminophen-caffeine -40 mg per tablet 1 tablet (1 tablet Oral Given 6/9/18 0030)   cloNIDine tablet 0.2 mg (0.2 mg Oral Given 6/9/18 0109)   cefTRIAXone injection 1 g (1 g Intramuscular Given 6/9/18 0109)   hydrALAZINE tablet 50 mg (50 mg Oral Given 6/9/18 0145)       Note was created using voice recognition software. Note may have occasional typographical errors that may not have been identified and edited despite good bonnie initial review prior to signing.  Discharge Medications     Discharge Medication List as of 6/9/2018  3:42 AM      START taking these medications    Details   fluticasone (FLONASE) 50 mcg/actuation nasal spray 2 sprays (100 mcg total) by Each Nare route once daily., Starting Sat 6/9/2018, Print      loratadine (CLARITIN) 10 mg tablet Take 1 tablet (10 mg total) by mouth once daily., Starting Sat 6/9/2018, Until Sun 6/9/2019, Print      montelukast (SINGULAIR) 10 mg tablet Take 1 tablet (10 mg total) by mouth every evening., Starting Sat 6/9/2018, Until Mon 7/9/2018, Print         CONTINUE these medications which have NOT CHANGED    Details   aspirin 81 MG Chew Take 1 tablet (81 mg total) by mouth once daily., Starting Wed 2/21/2018, Until Thu 2/21/2019, No Print      cefazolin sodium (CEFAZOLIN 2G/20 ML SYRINGE, PYXIS,) Inject 20 mLs (2 g total) into the vein every Tues, Thurs,  Sat. Last dose 6/9/2018, Starting Thu 5/31/2018, Until Sun 6/10/2018, Print      cinacalcet (SENSIPAR) 30 MG Tab Take 1 tablet (30 mg total) by mouth daily with breakfast., Starting Fri 1/26/2018, Until Sat 1/26/2019, Normal      citalopram (CELEXA) 20 MG tablet Take 1 tablet (20 mg total) by mouth once daily., Starting Wed 4/25/2018, Until Thu 4/25/2019, Normal      cloNIDine 0.2 mg/24 hr td ptwk (CATAPRES) 0.2 mg/24 hr Place 1 patch onto the skin every 7 days. Change every Wednesday, Starting Wed 3/14/2018, Until Thu 3/14/2019, Normal      famotidine (PEPCID) 20 MG tablet Take 1 tablet (20 mg total) by mouth once daily., Starting Wed 5/30/2018, Until Thu 5/30/2019, Print      food supplemt, lactose-reduced (ENSURE ACTIVE HIGH PROTEIN) Liqd Take 236 mLs by mouth 2 (two) times daily., Starting Wed 8/16/2017, Normal      hydrALAZINE (APRESOLINE) 50 MG tablet Take 1 tablet (50 mg total) by mouth every 8 (eight) hours., Starting Sat 5/19/2018, Normal      labetalol (NORMODYNE) 200 MG tablet Take 2 tablets (400 mg total) by mouth every 8 (eight) hours., Starting Wed 3/14/2018, Normal      lacosamide (VIMPAT) 50 mg Tab Take by mouth every 12 (twelve) hours., Historical Med      levETIRAcetam (KEPPRA) 500 MG Tab Take 500 mg by mouth 2 (two) times daily., Historical Med      mycophenolate (CELLCEPT) 250 mg Cap Take 1,000 mg by mouth 2 (two) times daily., Historical Med      !! NIFEdipine (PROCARDIA-XL) 60 MG (OSM) 24 hr tablet TAKE ONE(1) TABLET BY MOUTH EVERY DAY, Historical Med      !! NIFEdipine (PROCARDIA-XL) 90 MG (OSM) 24 hr tablet Take 1 tablet (90 mg total) by mouth once daily., Starting Sat 5/19/2018, Normal      ondansetron (ZOFRAN) 4 MG tablet Take 1 tablet (4 mg total) by mouth every 6 (six) hours as needed for Nausea., Starting Thu 5/3/2018, Print      predniSONE (DELTASONE) 1 MG tablet Take 1 mg by mouth every other day., Until Discontinued, Historical Med      tacrolimus (PROGRAF) 1 MG Cap Take 7 capsules (7  mg total) by mouth every 12 (twelve) hours., Starting Fri 3/23/2018, Until Sat 3/23/2019, Normal      triamcinolone acetonide 0.1% (KENALOG) 0.1 % ointment AAA on arms, legs, and neck bid x 1-2 wks then prn flares only, Normal       !! - Potential duplicate medications found. Please discuss with provider.                Patient discharged to home in stable condition with instructions to:   1. Please take all meds as prescribed.  2. Follow-up with your primary care doctor   3. Return precautions discussed and patient and/or family/caretaker understands to return to the emergency room for any concerns including worsening of your current symptoms, fever, chills, night sweats, worsening pain, chest pain, shortness of breath, nausea, vomiting, diarrhea, bleeding, headache, difficulty talking, visual disturbances, weakness, numbness or any other acute concerns    Clinical Impression:   The primary encounter diagnosis was Essential hypertension. A diagnosis of Maxillary sinusitis, unspecified chronicity was also pertinent to this visit.                             Álvaro Caba MD  06/09/18 [id number]

## 2018-06-15 ENCOUNTER — HOSPITAL ENCOUNTER (OUTPATIENT)
Facility: OTHER | Age: 28
Discharge: HOME | End: 2018-06-15
Attending: OBSTETRICS & GYNECOLOGY | Admitting: OBSTETRICS & GYNECOLOGY
Payer: MEDICARE

## 2018-06-15 ENCOUNTER — ANESTHESIA (OUTPATIENT)
Dept: SURGERY | Facility: OTHER | Age: 28
End: 2018-06-15
Payer: MEDICARE

## 2018-06-15 VITALS
OXYGEN SATURATION: 100 % | DIASTOLIC BLOOD PRESSURE: 127 MMHG | TEMPERATURE: 98 F | BODY MASS INDEX: 17.66 KG/M2 | WEIGHT: 106 LBS | HEIGHT: 65 IN | HEART RATE: 98 BPM | RESPIRATION RATE: 18 BRPM | SYSTOLIC BLOOD PRESSURE: 193 MMHG

## 2018-06-15 DIAGNOSIS — N87.1 MODERATE CERVICAL DYSPLASIA: ICD-10-CM

## 2018-06-15 DIAGNOSIS — Z01.818 PREOPERATIVE EXAM FOR GYNECOLOGIC SURGERY: ICD-10-CM

## 2018-06-15 DIAGNOSIS — Z94.4 LIVER REPLACED BY TRANSPLANT: Chronic | ICD-10-CM

## 2018-06-15 DIAGNOSIS — Z98.890 STATUS POST LEEP (LOOP ELECTROSURGICAL EXCISION PROCEDURE) OF CERVIX: Primary | ICD-10-CM

## 2018-06-15 LAB
ABO + RH BLD: NORMAL
ANION GAP SERPL CALC-SCNC: 12 MMOL/L
BASOPHILS # BLD AUTO: 0.01 K/UL
BASOPHILS NFR BLD: 0.4 %
BLD GP AB SCN CELLS X3 SERPL QL: NORMAL
BUN SERPL-MCNC: 22 MG/DL
CALCIUM SERPL-MCNC: 8.4 MG/DL
CHLORIDE SERPL-SCNC: 99 MMOL/L
CO2 SERPL-SCNC: 28 MMOL/L
CREAT SERPL-MCNC: 5 MG/DL
DIFFERENTIAL METHOD: ABNORMAL
EOSINOPHIL # BLD AUTO: 0.3 K/UL
EOSINOPHIL NFR BLD: 10.2 %
ERYTHROCYTE [DISTWIDTH] IN BLOOD BY AUTOMATED COUNT: 18 %
EST. GFR  (AFRICAN AMERICAN): 13 ML/MIN/1.73 M^2
EST. GFR  (NON AFRICAN AMERICAN): 11 ML/MIN/1.73 M^2
GLUCOSE SERPL-MCNC: 90 MG/DL
HCG INTACT+B SERPL-ACNC: <1.2 MIU/ML
HCT VFR BLD AUTO: 22.5 %
HGB BLD-MCNC: 7.1 G/DL
INR PPP: 1.1
LYMPHOCYTES # BLD AUTO: 0.5 K/UL
LYMPHOCYTES NFR BLD: 18.6 %
MCH RBC QN AUTO: 26.2 PG
MCHC RBC AUTO-ENTMCNC: 31.6 G/DL
MCV RBC AUTO: 83 FL
MONOCYTES # BLD AUTO: 0.2 K/UL
MONOCYTES NFR BLD: 7.4 %
NEUTROPHILS # BLD AUTO: 1.8 K/UL
NEUTROPHILS NFR BLD: 62.3 %
PLATELET # BLD AUTO: 75 K/UL
PMV BLD AUTO: 9.5 FL
POTASSIUM SERPL-SCNC: 3.5 MMOL/L
PROTHROMBIN TIME: 11.8 SEC
RBC # BLD AUTO: 2.71 M/UL
SODIUM SERPL-SCNC: 139 MMOL/L
WBC # BLD AUTO: 2.85 K/UL

## 2018-06-15 PROCEDURE — 25000003 PHARM REV CODE 250: Performed by: OBSTETRICS & GYNECOLOGY

## 2018-06-15 PROCEDURE — 86901 BLOOD TYPING SEROLOGIC RH(D): CPT

## 2018-06-15 PROCEDURE — 27201423 OPTIME MED/SURG SUP & DEVICES STERILE SUPPLY: Performed by: OBSTETRICS & GYNECOLOGY

## 2018-06-15 PROCEDURE — 37000008 HC ANESTHESIA 1ST 15 MINUTES: Performed by: OBSTETRICS & GYNECOLOGY

## 2018-06-15 PROCEDURE — 85025 COMPLETE CBC W/AUTO DIFF WBC: CPT

## 2018-06-15 PROCEDURE — 25000003 PHARM REV CODE 250: Performed by: ANESTHESIOLOGY

## 2018-06-15 PROCEDURE — 37000009 HC ANESTHESIA EA ADD 15 MINS: Performed by: OBSTETRICS & GYNECOLOGY

## 2018-06-15 PROCEDURE — 88305 TISSUE EXAM BY PATHOLOGIST: CPT | Mod: 26,,, | Performed by: PATHOLOGY

## 2018-06-15 PROCEDURE — 63600175 PHARM REV CODE 636 W HCPCS: Performed by: NURSE ANESTHETIST, CERTIFIED REGISTERED

## 2018-06-15 PROCEDURE — 36000706: Performed by: OBSTETRICS & GYNECOLOGY

## 2018-06-15 PROCEDURE — 80048 BASIC METABOLIC PNL TOTAL CA: CPT

## 2018-06-15 PROCEDURE — 71000015 HC POSTOP RECOV 1ST HR: Performed by: OBSTETRICS & GYNECOLOGY

## 2018-06-15 PROCEDURE — 57522 CONIZATION OF CERVIX: CPT | Mod: ,,, | Performed by: OBSTETRICS & GYNECOLOGY

## 2018-06-15 PROCEDURE — 71000016 HC POSTOP RECOV ADDL HR: Performed by: OBSTETRICS & GYNECOLOGY

## 2018-06-15 PROCEDURE — 25000242 PHARM REV CODE 250 ALT 637 W/ HCPCS: Performed by: ANESTHESIOLOGY

## 2018-06-15 PROCEDURE — 88307 TISSUE EXAM BY PATHOLOGIST: CPT | Mod: 59 | Performed by: PATHOLOGY

## 2018-06-15 PROCEDURE — 71000033 HC RECOVERY, INTIAL HOUR: Performed by: OBSTETRICS & GYNECOLOGY

## 2018-06-15 PROCEDURE — 94761 N-INVAS EAR/PLS OXIMETRY MLT: CPT

## 2018-06-15 PROCEDURE — 36000707: Performed by: OBSTETRICS & GYNECOLOGY

## 2018-06-15 PROCEDURE — 88307 TISSUE EXAM BY PATHOLOGIST: CPT | Mod: 26,,, | Performed by: PATHOLOGY

## 2018-06-15 PROCEDURE — 63600175 PHARM REV CODE 636 W HCPCS: Performed by: ANESTHESIOLOGY

## 2018-06-15 PROCEDURE — 85610 PROTHROMBIN TIME: CPT

## 2018-06-15 PROCEDURE — 88305 TISSUE EXAM BY PATHOLOGIST: CPT | Performed by: PATHOLOGY

## 2018-06-15 PROCEDURE — 84702 CHORIONIC GONADOTROPIN TEST: CPT

## 2018-06-15 PROCEDURE — 25000003 PHARM REV CODE 250: Performed by: NURSE ANESTHETIST, CERTIFIED REGISTERED

## 2018-06-15 PROCEDURE — 36415 COLL VENOUS BLD VENIPUNCTURE: CPT

## 2018-06-15 PROCEDURE — 94640 AIRWAY INHALATION TREATMENT: CPT

## 2018-06-15 RX ORDER — LIDOCAINE HCL/PF 100 MG/5ML
SYRINGE (ML) INTRAVENOUS
Status: DISCONTINUED | OUTPATIENT
Start: 2018-06-15 | End: 2018-06-15

## 2018-06-15 RX ORDER — DIPHENHYDRAMINE HCL 25 MG
25 CAPSULE ORAL EVERY 4 HOURS PRN
Status: DISCONTINUED | OUTPATIENT
Start: 2018-06-15 | End: 2018-06-15 | Stop reason: HOSPADM

## 2018-06-15 RX ORDER — FENTANYL CITRATE 50 UG/ML
INJECTION, SOLUTION INTRAMUSCULAR; INTRAVENOUS
Status: DISCONTINUED | OUTPATIENT
Start: 2018-06-15 | End: 2018-06-15

## 2018-06-15 RX ORDER — SODIUM CHLORIDE 0.9 % (FLUSH) 0.9 %
3 SYRINGE (ML) INJECTION
Status: DISCONTINUED | OUTPATIENT
Start: 2018-06-15 | End: 2018-06-15 | Stop reason: HOSPADM

## 2018-06-15 RX ORDER — MIDAZOLAM HYDROCHLORIDE 1 MG/ML
INJECTION INTRAMUSCULAR; INTRAVENOUS
Status: DISCONTINUED | OUTPATIENT
Start: 2018-06-15 | End: 2018-06-15

## 2018-06-15 RX ORDER — FENTANYL CITRATE 50 UG/ML
25 INJECTION, SOLUTION INTRAMUSCULAR; INTRAVENOUS EVERY 5 MIN PRN
Status: DISCONTINUED | OUTPATIENT
Start: 2018-06-15 | End: 2018-06-15 | Stop reason: HOSPADM

## 2018-06-15 RX ORDER — HYDROCODONE BITARTRATE AND ACETAMINOPHEN 5; 325 MG/1; MG/1
1 TABLET ORAL EVERY 4 HOURS PRN
Status: DISCONTINUED | OUTPATIENT
Start: 2018-06-15 | End: 2018-06-15 | Stop reason: HOSPADM

## 2018-06-15 RX ORDER — DIPHENHYDRAMINE HYDROCHLORIDE 50 MG/ML
25 INJECTION INTRAMUSCULAR; INTRAVENOUS EVERY 4 HOURS PRN
Status: DISCONTINUED | OUTPATIENT
Start: 2018-06-15 | End: 2018-06-15 | Stop reason: HOSPADM

## 2018-06-15 RX ORDER — MEPERIDINE HYDROCHLORIDE 50 MG/ML
12.5 INJECTION INTRAMUSCULAR; INTRAVENOUS; SUBCUTANEOUS ONCE AS NEEDED
Status: DISCONTINUED | OUTPATIENT
Start: 2018-06-15 | End: 2018-06-15 | Stop reason: HOSPADM

## 2018-06-15 RX ORDER — ONDANSETRON 2 MG/ML
4 INJECTION INTRAMUSCULAR; INTRAVENOUS DAILY PRN
Status: DISCONTINUED | OUTPATIENT
Start: 2018-06-15 | End: 2018-06-15 | Stop reason: HOSPADM

## 2018-06-15 RX ORDER — LIDOCAINE HYDROCHLORIDE 10 MG/ML
0.5 INJECTION, SOLUTION EPIDURAL; INFILTRATION; INTRACAUDAL; PERINEURAL ONCE
Status: DISCONTINUED | OUTPATIENT
Start: 2018-06-15 | End: 2018-06-15 | Stop reason: HOSPADM

## 2018-06-15 RX ORDER — VASOPRESSIN 20 [USP'U]/ML
INJECTION, SOLUTION INTRAMUSCULAR; SUBCUTANEOUS
Status: DISCONTINUED | OUTPATIENT
Start: 2018-06-15 | End: 2018-06-15 | Stop reason: HOSPADM

## 2018-06-15 RX ORDER — OXYCODONE HYDROCHLORIDE 5 MG/1
5 TABLET ORAL
Status: DISCONTINUED | OUTPATIENT
Start: 2018-06-15 | End: 2018-06-15 | Stop reason: HOSPADM

## 2018-06-15 RX ORDER — ALBUTEROL SULFATE 0.83 MG/ML
2.5 SOLUTION RESPIRATORY (INHALATION)
Status: COMPLETED | OUTPATIENT
Start: 2018-06-15 | End: 2018-06-15

## 2018-06-15 RX ORDER — SODIUM CHLORIDE 9 MG/ML
INJECTION, SOLUTION INTRAVENOUS CONTINUOUS PRN
Status: DISCONTINUED | OUTPATIENT
Start: 2018-06-15 | End: 2018-06-15

## 2018-06-15 RX ORDER — SODIUM CHLORIDE, SODIUM LACTATE, POTASSIUM CHLORIDE, CALCIUM CHLORIDE 600; 310; 30; 20 MG/100ML; MG/100ML; MG/100ML; MG/100ML
INJECTION, SOLUTION INTRAVENOUS CONTINUOUS
Status: DISCONTINUED | OUTPATIENT
Start: 2018-06-15 | End: 2018-06-15 | Stop reason: HOSPADM

## 2018-06-15 RX ORDER — ACETAMINOPHEN AND CODEINE PHOSPHATE 300; 30 MG/1; MG/1
1 TABLET ORAL EVERY 6 HOURS PRN
Qty: 10 TABLET | Refills: 0 | Status: ON HOLD | OUTPATIENT
Start: 2018-06-15 | End: 2018-09-02

## 2018-06-15 RX ORDER — HYDROMORPHONE HYDROCHLORIDE 2 MG/ML
0.4 INJECTION, SOLUTION INTRAMUSCULAR; INTRAVENOUS; SUBCUTANEOUS EVERY 5 MIN PRN
Status: DISCONTINUED | OUTPATIENT
Start: 2018-06-15 | End: 2018-06-15 | Stop reason: HOSPADM

## 2018-06-15 RX ORDER — DIPHENHYDRAMINE HYDROCHLORIDE 50 MG/ML
25 INJECTION INTRAMUSCULAR; INTRAVENOUS EVERY 6 HOURS PRN
Status: DISCONTINUED | OUTPATIENT
Start: 2018-06-15 | End: 2018-06-15 | Stop reason: HOSPADM

## 2018-06-15 RX ORDER — PROPOFOL 10 MG/ML
VIAL (ML) INTRAVENOUS
Status: DISCONTINUED | OUTPATIENT
Start: 2018-06-15 | End: 2018-06-15

## 2018-06-15 RX ADMIN — FENTANYL CITRATE 25 MCG: 50 INJECTION, SOLUTION INTRAMUSCULAR; INTRAVENOUS at 08:06

## 2018-06-15 RX ADMIN — FENTANYL CITRATE 25 MCG: 50 INJECTION, SOLUTION INTRAMUSCULAR; INTRAVENOUS at 09:06

## 2018-06-15 RX ADMIN — DIPHENHYDRAMINE HYDROCHLORIDE 25 MG: 50 INJECTION, SOLUTION INTRAMUSCULAR; INTRAVENOUS at 09:06

## 2018-06-15 RX ADMIN — LIDOCAINE HYDROCHLORIDE 75 MG: 20 INJECTION, SOLUTION INTRAVENOUS at 08:06

## 2018-06-15 RX ADMIN — ALBUTEROL SULFATE 2.5 MG: 2.5 SOLUTION RESPIRATORY (INHALATION) at 07:06

## 2018-06-15 RX ADMIN — FENTANYL CITRATE 75 MCG: 50 INJECTION, SOLUTION INTRAMUSCULAR; INTRAVENOUS at 09:06

## 2018-06-15 RX ADMIN — MIDAZOLAM HYDROCHLORIDE 2 MG: 1 INJECTION, SOLUTION INTRAMUSCULAR; INTRAVENOUS at 08:06

## 2018-06-15 RX ADMIN — SODIUM CHLORIDE: 0.9 INJECTION, SOLUTION INTRAVENOUS at 07:06

## 2018-06-15 RX ADMIN — PROPOFOL 100 MG: 10 INJECTION, EMULSION INTRAVENOUS at 08:06

## 2018-06-15 RX ADMIN — CARBOXYMETHYLCELLULOSE SODIUM 2 DROP: 2.5 SOLUTION/ DROPS OPHTHALMIC at 08:06

## 2018-06-15 NOTE — OR NURSING
Dr diego fried aware of continued elevated bp 193/125 mmhg pt wearing clonidine patch. No further orders given

## 2018-06-15 NOTE — TRANSFER OF CARE
"Anesthesia Transfer of Care Note    Patient: Holly Patel    Procedure(s) Performed: Procedure(s) (LRB):  CONIZATION-CERVICAL-LEEP (N/A)    Patient location: PACU    Anesthesia Type: general    Transport from OR: Transported from OR on 2-3 L/min O2 by NC with adequate spontaneous ventilation    Post pain: adequate analgesia    Post assessment: no apparent anesthetic complications    Post vital signs: stable    Level of consciousness: awake, alert and oriented    Nausea/Vomiting: no nausea/vomiting    Complications: none    Transfer of care protocol was followed      Last vitals:   Visit Vitals  BP (!) 241/153   Pulse 89   Temp 36.5 °C (97.7 °F)   Resp 18   Ht 5' 5" (1.651 m)   Wt 48.1 kg (106 lb)   LMP 05/30/2018   SpO2 100%   BMI 17.64 kg/m²     "

## 2018-06-15 NOTE — BRIEF OP NOTE
BRIEF OPERATIVE NOTE       SUMMARY      Surgery Date: 06/15/2018     SURGEON: Neelam Marroquin    ASSISTANT: None    PREOP DIAGNOSIS: Moderate Cervical Dysplasia    POSTOP DIAGNOSIS:  Same, pending Pathology    PROCEDURES: LEEP    ANESTHESIA: general    FINDINGS/KEY COMPONENTS: Friable cervix, nonstaining area of cervix    ESTIMATED BLOOD LOSS: 5cc    COMPLICATIONS: none noted    PATHOLOGY:   Specimens     Start     Ordered    06/15/18 0919  Specimen to Pathology - Surgery  : LEEP ; cervix 12 oclock, ECC    06/15/18 0919        Dictation #1  MRN:[id number]  [facility]:[id number]  [id number]

## 2018-06-15 NOTE — ANESTHESIA POSTPROCEDURE EVALUATION
"Anesthesia Post Evaluation    Patient: Holly Patel    Procedure(s) Performed: Procedure(s) (LRB):  CONIZATION-CERVICAL-LEEP (N/A)    Final Anesthesia Type: general  Patient location during evaluation: PACU  Patient participation: Yes- Able to Participate  Level of consciousness: awake and alert  Post-procedure vital signs: reviewed and stable  Pain management: adequate  Airway patency: patent  PONV status at discharge: No PONV  Anesthetic complications: no      Cardiovascular status: blood pressure returned to baseline  Respiratory status: unassisted and room air  Hydration status: euvolemic  Follow-up not needed.        Visit Vitals  BP (!) 138/130 (BP Location: Right arm, Patient Position: Lying)   Pulse 96   Temp 36.8 °C (98.2 °F) (Oral)   Resp 18   Ht 5' 5" (1.651 m)   Wt 48.1 kg (106 lb)   LMP 05/30/2018   SpO2 99%   BMI 17.64 kg/m²       Pain/Bladimir Score: Pain Assessment Performed: Yes (6/15/2018 10:34 AM)  Presence of Pain: denies (6/15/2018 10:34 AM)  Bladimir Score: 10 (6/15/2018 10:28 AM)      "

## 2018-06-15 NOTE — INTERVAL H&P NOTE
The patient has been examined and the H&P has been reviewed:    I concur with the findings and no changes have occurred since H&P was written.    Anesthesia/Surgery risks, benefits and alternative options discussed and understood by patient/family.          Active Hospital Problems    Diagnosis  POA    Preoperative exam for gynecologic surgery [Z01.818]  Not Applicable      Resolved Hospital Problems    Diagnosis Date Resolved POA   No resolved problems to display.

## 2018-06-15 NOTE — H&P (VIEW-ONLY)
[facility] Pre-op Exam      HPI : Holly Patel is a 27 y.o. female  female who presents for preop exam for a LEEP Procedure scheduled on Kandis 15,2018 for Moderate Cervical Dysplasia .  She already has a transplanted liver, and is on the waiting list for a kidney transplant.  She was recently hospitalized with pneumonia and suspected sepsis and is getting IV antibiotics at the time of her Dialysis treatments. She also has thrombocytopenia, and was told by her hematologist that she may need Platelet transfusion prior to her LEEP procedure depending on her platelet count at that time.    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 ()    Moderate protein-calorie malnutrition 2017    MRSA bacteremia 2017    Prophylactic immunotherapy 2014    Renovascular hypertension 10/2/2015    Secondary hyperparathyroidism 2017    Sialadenitis 3/21/2018    Thrombocytopenia 2016    Thrombocytopenia 2016     Past Surgical History:   Procedure Laterality Date     SECTION      x 2    LIVER BIOPSY      LIVER TRANSPLANT  1992    TUBAL LIGATION  2010     Family History   Problem Relation Age of Onset    Hypertension Mother     Hypertension Father     Melanoma Neg Hx     Breast cancer Neg Hx     Colon cancer Neg Hx     Ovarian cancer Neg Hx      OB History      Para Term  AB Living    2 2 0 2 0 2    SAB TAB Ectopic Multiple Live Births    0 0 0   2        Review of patient's allergies indicates:   Allergen  Reactions    Chloral hydrate      Other reaction(s): Hallucinations  Other reaction(s): Hives    Hydrocodone Other (See Comments)     Mental status changes       Current Outpatient Prescriptions:     aspirin 81 MG Chew, Take 1 tablet (81 mg total) by mouth once daily., Disp: , Rfl: 0    butalbital-acetaminophen-caffeine -40 mg (FIORICET, ESGIC) -40 mg per tablet, Take 1 tablet by mouth every 6 (six) hours as needed for Headaches., Disp: 15 tablet, Rfl: 0    cefazolin sodium (CEFAZOLIN 2G/20 ML SYRINGE, PYXIS,), Inject 20 mLs (2 g total) into the vein every Tues, Thurs, Sat. Last dose 6/9/2018, Disp: 100 mL, Rfl: 0    cinacalcet (SENSIPAR) 30 MG Tab, Take 1 tablet (30 mg total) by mouth daily with breakfast., Disp: 30 tablet, Rfl: 11    citalopram (CELEXA) 20 MG tablet, Take 1 tablet (20 mg total) by mouth once daily., Disp: 30 tablet, Rfl: 1    cloNIDine 0.2 mg/24 hr td ptwk (CATAPRES) 0.2 mg/24 hr, Place 1 patch onto the skin every 7 days. Change every Wednesday, Disp: 4 patch, Rfl: 11    famotidine (PEPCID) 20 MG tablet, Take 1 tablet (20 mg total) by mouth once daily., Disp: 20 tablet, Rfl: 0    food supplemt, lactose-reduced (ENSURE ACTIVE HIGH PROTEIN) Liqd, Take 236 mLs by mouth 2 (two) times daily., Disp: 60 Can, Rfl: 6    hydrALAZINE (APRESOLINE) 50 MG tablet, Take 1 tablet (50 mg total) by mouth every 8 (eight) hours., Disp: 90 tablet, Rfl: 5    labetalol (NORMODYNE) 200 MG tablet, Take 2 tablets (400 mg total) by mouth every 8 (eight) hours., Disp: 360 tablet, Rfl: 11    lacosamide (VIMPAT) 50 mg Tab, Take by mouth every 12 (twelve) hours., Disp: , Rfl:     levETIRAcetam (KEPPRA) 500 MG Tab, Take 500 mg by mouth 2 (two) times daily., Disp: , Rfl:     mycophenolate (CELLCEPT) 250 mg Cap, Take 1,000 mg by mouth 2 (two) times daily., Disp: , Rfl:     NIFEdipine (PROCARDIA-XL) 60 MG (OSM) 24 hr tablet, TAKE ONE(1) TABLET BY MOUTH EVERY DAY, Disp: , Rfl: 0    NIFEdipine  (PROCARDIA-XL) 90 MG (OSM) 24 hr tablet, Take 1 tablet (90 mg total) by mouth once daily. (Patient taking differently: Take 60 mg by mouth once daily. ), Disp: 30 tablet, Rfl: 11    ondansetron (ZOFRAN) 4 MG tablet, Take 1 tablet (4 mg total) by mouth every 6 (six) hours as needed for Nausea., Disp: 15 tablet, Rfl: 0    predniSONE (DELTASONE) 1 MG tablet, Take 1 mg by mouth every other day., Disp: , Rfl:     tacrolimus (PROGRAF) 1 MG Cap, Take 7 capsules (7 mg total) by mouth every 12 (twelve) hours., Disp: 420 capsule, Rfl: 11    triamcinolone acetonide 0.1% (KENALOG) 0.1 % ointment, AAA on arms, legs, and neck bid x 1-2 wks then prn flares only (Patient taking differently: Apply to affected area(s) on arms, legs, and neck twice daily x 1-2 wks then as needed for flares only), Disp: 80 g, Rfl: 3  No current facility-administered medications for this visit.   Social History     Social History    Marital status: Legally      Spouse name: N/A    Number of children: N/A    Years of education: N/A     Occupational History    Not on file.     Social History Main Topics    Smoking status: Never Smoker    Smokeless tobacco: Never Used    Alcohol use No    Drug use: No    Sexual activity: No     Other Topics Concern    Are You Pregnant Or Think You May Be? No    Breast-Feeding No     Social History Narrative    Lives 2 kids, 7 and 8, and nephew. Her mom helps with kids.       Last pap : March 28,2018: ASCUS, + HR HPV, not 16/18  Last pathology : APril 30,2018:    FINAL PATHOLOGIC DIAGNOSIS  1. FRAGMENT OF BENIGN ENDOCERVICAL MUCOSA AND A DETACHED FRAGMENT OF SQUAMOUS  ILEUM SHOWING MODERATE DYSPLASIA AND ACUTE AND CHRONIC INFLAMMATION  2. CERVICAL BIOPSY SHOWING MODERATE SQUAMOUS DYSPLASIA (ROSHNI-2) AND CHRONIC  INFLAMMATION  NOTE: THESE BIOPSY FINDINGS CORRELATE WITH THE PATIENT'S PRIOR PAP SMEAR    BP (!) 130/58   Wt 48.4 kg (106 lb 11.2 oz)   LMP 04/30/2018 (Exact Date)   BMI 17.76 kg/m²      ROS:    GENERAL: Denies weight gain or weight loss. Feeling WEAK overall. (recently hospitalized with sepsis and pneumonia)  SKIN: Denies rash or lesions.   HEAD: Denies head injury or headache.   NODES: Denies enlarged lymph nodes.   CHEST: Denies chest pain or shortness of breath.   CARDIOVASCULAR: Denies palpitations or left sided chest pain.   ABDOMEN: No abdominal pain, constipation, diarrhea, nausea, vomiting or rectal bleeding.   URINARY: No frequency, dysuria, hematuria, or burning on urination.  REPRODUCTIVE: See HPI.   BREASTS: The patient performs breast self-examination and denies pain, lumps, or nipple discharge.   HEMATOLOGIC: No easy bruisability or excessive bleeding with the exception of menstrual cycles.  MUSCULOSKELETAL: Denies joint pain or swelling.   NEUROLOGIC: Denies syncope or weakness.   PSYCHIATRIC: Denies depression, anxiety or mood swings.    PHYSICAL EXAM:    APPEARANCE: Well nourished, well developed, in no acute distress.  AFFECT: WNL, alert and oriented x 3  SKIN: No acne or hirsutism  NECK: Neck symmetric without masses or thyromegaly  NODES: No inguinal, cervical, axillary, or femoral lymph node enlargement  CHEST: Good respiratory effect  ABDOMEN: Soft.  No tenderness or masses.  No hepatosplenomegaly.  No hernias.  PELVIC: Normal external genitalia without lesions.  Normal hair distribution.  Adequate perineal body, normal urethral meatus.  Vagina moist and well rugated without lesions or discharge.  Cervix pink, without lesions, discharge or tenderness.  No significant cystocele or rectocele.  Bimanual exam shows uterus to be normal size, regular, mobile and nontender.  Adnexa without masses or tenderness.    EXTREMITIES: No edema.    ASSESSMENT & PLAN:    Holly was seen today for pre-op exam.    Diagnoses and all orders for this visit:    Preoperative exam for gynecologic surgery  -     Vital signs; Standing  -     Up ad russ; Standing  -     Chlorhexidine (CHG) 2% Wipes;  Standing  -     Chlorohexidine Gluconate Bath; Standing  -     Place in Outpatient; Standing  -     Diet clear liquid; Standing  -     Place LINDA hose; Standing  -     Place sequential compression device; Standing    Moderate cervical dysplasia  -     Vital signs; Standing  -     Up ad russ; Standing  -     Chlorhexidine (CHG) 2% Wipes; Standing  -     Chlorohexidine Gluconate Bath; Standing  -     Place in Outpatient; Standing  -     Diet clear liquid; Standing  -     Place LINDA hose; Standing  -     Place sequential compression device; Standing    Other orders  -     IP VTE LOW RISK PATIENT; Standing         I have discussed the risks, benefits, indications, and alternatives of the procedure in detail.  The patient verbalizes her understanding.  All questions answered.  Consents signed.  The patient agrees to proceed to proceed as planned.

## 2018-06-15 NOTE — PLAN OF CARE
Holly Patel has met all discharge criteria from Phase II. Vital Signs are stable, ambulating  without difficulty. Discharge instructions given, patient verbalized understanding. Discharged from facility via wheelchair in stable condition.     Awaiting bedside Rx delivery.

## 2018-06-15 NOTE — DISCHARGE SUMMARY
[facility]  Discharge Note  Short Stay    Admit Date: 6/15/2018    Discharge Date and Time: 6/15/2018     Attending Physician: Neelam Marroquin MD     Discharge Provider: Neelam Marroquin    Diagnoses:  Active Hospital Problems    Diagnosis  POA    Preoperative exam for gynecologic surgery [Z01.818]  Not Applicable      Resolved Hospital Problems    Diagnosis Date Resolved POA   No resolved problems to display.       Discharged Condition: stable    Hospital Course: Patient was admitted for an outpatient procedure and tolerated the procedure well with no complications.    Final Diagnoses: Same as principal problem.    Disposition: Home or Self Care    Follow up/Patient Instructions:    Medications:  Reconciled Home Medications:      Medication List      START taking these medications    acetaminophen-codeine 300-30mg 300-30 mg Tab  Commonly known as:  TYLENOL #3  Take 1 tablet by mouth every 6 (six) hours as needed.        CONTINUE taking these medications    aspirin 81 MG Chew  Take 1 tablet (81 mg total) by mouth once daily.     cinacalcet 30 MG Tab  Commonly known as:  SENSIPAR  Take 1 tablet (30 mg total) by mouth daily with breakfast.     citalopram 20 MG tablet  Commonly known as:  CELEXA  Take 1 tablet (20 mg total) by mouth once daily.     cloNIDine 0.2 mg/24 hr td ptwk 0.2 mg/24 hr  Commonly known as:  CATAPRES  Place 1 patch onto the skin every 7 days. Change every Wednesday     famotidine 20 MG tablet  Commonly known as:  PEPCID  Take 1 tablet (20 mg total) by mouth once daily.     fluticasone 50 mcg/actuation nasal spray  Commonly known as:  FLONASE  2 sprays (100 mcg total) by Each Nare route once daily.     food supplemt, lactose-reduced Liqd  Commonly known as:  ENSURE ACTIVE HIGH PROTEIN  Take 236 mLs by mouth 2 (two) times daily.     hydrALAZINE 50 MG tablet  Commonly known as:  APRESOLINE  Take 1 tablet (50 mg total) by mouth every 8 (eight) hours.        ASK your doctor about  these medications    labetalol 200 MG tablet  Commonly known as:  NORMODYNE  Take 2 tablets (400 mg total) by mouth every 8 (eight) hours.     lacosamide 50 mg Tab  Commonly known as:  VIMPAT  Take by mouth every 12 (twelve) hours.     levETIRAcetam 500 MG Tab  Commonly known as:  KEPPRA  Take 500 mg by mouth 2 (two) times daily.     loratadine 10 mg tablet  Commonly known as:  CLARITIN  Take 1 tablet (10 mg total) by mouth once daily.     montelukast 10 mg tablet  Commonly known as:  SINGULAIR  Take 1 tablet (10 mg total) by mouth every evening.     mycophenolate 250 mg Cap  Commonly known as:  CELLCEPT  Take 1,000 mg by mouth 2 (two) times daily.     * NIFEdipine 60 MG (OSM) 24 hr tablet  Commonly known as:  PROCARDIA-XL  TAKE ONE(1) TABLET BY MOUTH EVERY DAY     * NIFEdipine 90 MG (OSM) 24 hr tablet  Commonly known as:  PROCARDIA-XL  Take 1 tablet (90 mg total) by mouth once daily.     ondansetron 4 MG tablet  Commonly known as:  ZOFRAN  Take 1 tablet (4 mg total) by mouth every 6 (six) hours as needed for Nausea.     predniSONE 1 MG tablet  Commonly known as:  DELTASONE  Take 1 mg by mouth every other day.     tacrolimus 1 MG Cap  Commonly known as:  PROGRAF  Take 7 capsules (7 mg total) by mouth every 12 (twelve) hours.     triamcinolone acetonide 0.1% 0.1 % ointment  Commonly known as:  KENALOG  AAA on arms, legs, and neck bid x 1-2 wks then prn flares only        * This list has 2 medication(s) that are the same as other medications prescribed for you. Read the directions carefully, and ask your doctor or other care provider to review them with you.                Discharge Procedure Orders  Diet general     Activity as tolerated   Order Comments: Pelvic rest x 2 weeks     Call MD for:  temperature >100.4     Call MD for:  persistent nausea and vomiting     Call MD for:  severe uncontrolled pain     Call MD for:  difficulty breathing, headache or visual disturbances     Call MD for:  hives     Call MD for:   persistent dizziness or light-headedness     Call MD for:  extreme fatigue       Follow-up Information     Neelam Marroquin MD In 4 weeks.    Specialties:  Obstetrics, Obstetrics and Gynecology  Contact information:  [address] [id number] [phone]                   Discharge Procedure Orders (must include Diet, Follow-up, Activity):    Discharge Procedure Orders (must include Diet, Follow-up, Activity)  Diet general     Activity as tolerated   Order Comments: Pelvic rest x 2 weeks     Call MD for:  temperature >100.4     Call MD for:  persistent nausea and vomiting     Call MD for:  severe uncontrolled pain     Call MD for:  difficulty breathing, headache or visual disturbances     Call MD for:  hives     Call MD for:  persistent dizziness or light-headedness     Call MD for:  extreme fatigue

## 2018-06-15 NOTE — DISCHARGE INSTRUCTIONS
After a Cone Biopsy     Make sure to keep any follow-up appointments with your healthcare provider.     A cone biopsy is a quick outpatient surgery used to find and treat a problem in the cervix. Your healthcare provider may do a cone biopsy if one or more Pap tests and a microscope (colposcopy) exam showed abnormal cells on your cervix. A cone biopsy takes less than an hour, and youll be able to go home the same day.    During your recovery  After the surgery has been done, youll rest in the recovery area until youre awake and ready to go home. An adult friend or family member will need to drive you home.  · Plan to rest at home for a day or two.  · You may have some bleeding or discharge and mild cramping for a few days after surgery. Use sanitary pads, not tampons, for at least the first month.  · You may be given medicine to relieve any discomfort  · Do not have sexual intercourse or douche for 4 to 6 weeks after your biopsy. If the cervix has not fully healed, the tissue could be injured and then bleed.  · Follow any other instructions your healthcare provider gives you.    Getting your results  Your healthcare provider will get the biopsy results and discuss them with you in about a week. He or she will see you in 3 to 6 weeks to be sure the tissue is healing well.    Call your healthcare provider if you have any of the following after your cone biopsy:  · Heavy bleeding (more than a pad an hour) or blood clots  · Severe stomach pain  · Chills  · Fever over 100.4°F (38°C)         Discharge Instructions: After Your Surgery  Youve just had surgery. During surgery, you were given medicine called anesthesia to keep you relaxed and free of pain. After surgery, you may have some pain or nausea. This is common. Here are some tips for feeling better and getting well after surgery.     Stay on schedule with your medicine.     Going home  Your healthcare provider will show you how to take care of yourself when you  go home. He or she will also answer your questions. Have an adult family member or friend drive you home. For the first 24 hours after your surgery:    · Do not drive or use heavy equipment.  · Do not make important decisions or sign legal papers.  · Do not drink alcohol.  · Have someone stay with you, if needed. He or she can watch for problems and help keep you safe.    Be sure to go to all follow-up visits with your healthcare provider. And rest after your surgery for as long as your healthcare provider tells you to.    Coping with pain  If you have pain after surgery, pain medicine will help you feel better. Take it as told, before pain becomes severe. Also, ask your healthcare provider or pharmacist about other ways to control pain. This might be with heat, ice, or relaxation. And follow any other instructions your surgeon or nurse gives you.    Tips for taking pain medicine  To get the best relief possible, remember these points:    · Pain medicines can upset your stomach. Taking them with a little food may help.  · Most pain relievers taken by mouth need at least 20 to 30 minutes to start to work.  · Taking medicine on a schedule can help you remember to take it. Try to time your medicine so that you can take it before starting an activity. This might be before you get dressed, go for a walk, or sit down for dinner.  · Constipation is a common side effect of pain medicines. Call your healthcare provider before taking any medicines such as laxatives or stool softeners to help ease constipation. Also ask if you should skip any foods. Drinking lots of fluids and eating foods such as fruits and vegetables that are high in fiber can also help. Remember, do not take laxatives unless your surgeon has prescribed them.  · Drinking alcohol and taking pain medicine can cause dizziness and slow your breathing. It can even be deadly. Do not drink alcohol while taking pain medicine.  · Pain medicine can make you react more  slowly to things. Do not drive or run machinery while taking pain medicine.    Your healthcare provider may tell you to take acetaminophen to help ease your pain. Ask him or her how much you are supposed to take each day. Acetaminophen or other pain relievers may interact with your prescription medicines or other over-the-counter (OTC) medicines. Some prescription medicines have acetaminophen and other ingredients. Using both prescription and OTC acetaminophen for pain can cause you to overdose. Read the labels on your OTC medicines with care. This will help you to clearly know the list of ingredients, how much to take, and any warnings. It may also help you not take too much acetaminophen. If you have questions or do not understand the information, ask your pharmacist or healthcare provider to explain it to you before you take the OTC medicine.    Managing nausea  Some people have an upset stomach after surgery. This is often because of anesthesia, pain, or pain medicine, or the stress of surgery. These tips will help you handle nausea and eat healthy foods as you get better. If you were on a special food plan before surgery, ask your healthcare provider if you should follow it while you get better. These tips may help:    · Do not push yourself to eat. Your body will tell you when to eat and how much.  · Start off with clear liquids and soup. They are easier to digest.  · Next try semi-solid foods, such as mashed potatoes, applesauce, and gelatin, as you feel ready.  · Slowly move to solid foods. Dont eat fatty, rich, or spicy foods at first.  · Do not force yourself to have 3 large meals a day. Instead eat smaller amounts more often.  · Take pain medicines with a small amount of solid food, such as crackers or toast, to avoid nausea.     Call your surgeon if  · You still have pain an hour after taking medicine. The medicine may not be strong enough.  · You feel too sleepy, dizzy, or groggy. The medicine may be  too strong.  · You have side effects like nausea, vomiting, or skin changes, such as rash, itching, or hives.       If you have obstructive sleep apnea  You were given anesthesia medicine during surgery to keep you comfortable and free of pain. After surgery, you may have more apnea spells because of this medicine and other medicines you were given. The spells may last longer than usual.   At home:    · Keep using the continuous positive airway pressure (CPAP) device when you sleep. Unless your healthcare provider tells you not to, use it when you sleep, day or night. CPAP is a common device used to treat obstructive sleep apnea.  · Talk with your provider before taking any pain medicine, muscle relaxants, or sedatives. Your provider will tell you about the possible dangers of taking these medicines.    © 8532-5151 [employer]. [address] [place] [address], [place] [address]. All rights reserved. This information is not intended as a substitute for professional medical care. Always follow your healthcare professional's instructions.    PLEASE FOLLOW ANY OTHER INSTRUCTIONS PROVIDED TOY OU BY DR. HERNANDEZ!

## 2018-06-15 NOTE — PLAN OF CARE
Patient is on dialysis and urinates very little.  Called resident to see if urination id required for discharge home.  Per Levon, states it is ok for patient to be discharged if she is unable to urinate d/t her ESRD

## 2018-06-18 ENCOUNTER — OFFICE VISIT (OUTPATIENT)
Dept: INTERNAL MEDICINE | Facility: CLINIC | Age: 28
DRG: 907 | End: 2018-06-18
Payer: MEDICARE

## 2018-06-18 ENCOUNTER — HOSPITAL ENCOUNTER (OUTPATIENT)
Dept: RADIOLOGY | Facility: HOSPITAL | Age: 28
Discharge: HOME | DRG: 907 | End: 2018-06-18
Attending: NURSE PRACTITIONER
Payer: MEDICARE

## 2018-06-18 ENCOUNTER — TELEPHONE (OUTPATIENT)
Dept: INTERNAL MEDICINE | Facility: CLINIC | Age: 28
End: 2018-06-18

## 2018-06-18 VITALS
OXYGEN SATURATION: 100 % | HEART RATE: 82 BPM | BODY MASS INDEX: 19.21 KG/M2 | WEIGHT: 115.31 LBS | DIASTOLIC BLOOD PRESSURE: 102 MMHG | HEIGHT: 65 IN | SYSTOLIC BLOOD PRESSURE: 182 MMHG

## 2018-06-18 DIAGNOSIS — I15.0 RENOVASCULAR HYPERTENSION: ICD-10-CM

## 2018-06-18 DIAGNOSIS — R93.89 ABNORMAL CXR: Primary | ICD-10-CM

## 2018-06-18 DIAGNOSIS — J18.9 PNEUMONIA DUE TO INFECTIOUS ORGANISM, UNSPECIFIED LATERALITY, UNSPECIFIED PART OF LUNG: Primary | ICD-10-CM

## 2018-06-18 DIAGNOSIS — K92.0 HEMATEMESIS WITH NAUSEA: ICD-10-CM

## 2018-06-18 DIAGNOSIS — J18.9 PNEUMONIA DUE TO INFECTIOUS ORGANISM, UNSPECIFIED LATERALITY, UNSPECIFIED PART OF LUNG: ICD-10-CM

## 2018-06-18 DIAGNOSIS — K29.70 GASTRITIS, PRESENCE OF BLEEDING UNSPECIFIED, UNSPECIFIED CHRONICITY, UNSPECIFIED GASTRITIS TYPE: ICD-10-CM

## 2018-06-18 PROCEDURE — 99214 OFFICE O/P EST MOD 30 MIN: CPT | Mod: S$PBB,,, | Performed by: NURSE PRACTITIONER

## 2018-06-18 PROCEDURE — 99999 PR PBB SHADOW E&M-EST. PATIENT-LVL V: CPT | Mod: PBBFAC,,, | Performed by: NURSE PRACTITIONER

## 2018-06-18 PROCEDURE — 99215 OFFICE O/P EST HI 40 MIN: CPT | Mod: PBBFAC | Performed by: NURSE PRACTITIONER

## 2018-06-18 PROCEDURE — 71046 X-RAY EXAM CHEST 2 VIEWS: CPT | Mod: TC

## 2018-06-18 PROCEDURE — 71046 X-RAY EXAM CHEST 2 VIEWS: CPT | Mod: 26,,, | Performed by: RADIOLOGY

## 2018-06-18 RX ORDER — CLONIDINE 0.3 MG/24H
1 PATCH, EXTENDED RELEASE TRANSDERMAL
Qty: 4 PATCH | Refills: 11 | Status: ON HOLD | OUTPATIENT
Start: 2018-06-18 | End: 2018-01-01 | Stop reason: HOSPADM

## 2018-06-18 RX ORDER — FAMOTIDINE 40 MG/1
40 TABLET, FILM COATED ORAL DAILY
Qty: 30 TABLET | Refills: 11 | Status: ON HOLD | OUTPATIENT
Start: 2018-06-18 | End: 2018-06-29

## 2018-06-18 NOTE — PATIENT INSTRUCTIONS
Change dose of your blood pressure patch and follow up with Dr Sosa in 2 weeks to assess your blood pressure.  New prescription sent to [facility]    Continue all other medications    Chest xray today I will call you with your results      Call for any concerns    Schedule with GI    Resume Pepcid 40 mg a day

## 2018-06-18 NOTE — OP NOTE
DATE OF PROCEDURE:  06/15/2018.    SURGEON:  Neelam Marroquin M.D.    ASSISTANT:  None.    PREOPERATIVE DIAGNOSIS:  Moderate cervical dysplasia.    POSTOPERATIVE DIAGNOSIS:  Moderate cervical dysplasia.  Pending pathology.    PROCEDURE:  LEEP procedure.    ESTIMATED BLOOD LOSS:  5 mL    FINDINGS:  Very friable cervix and atrophic vaginal mucosa as well as a   nonstaining area of the cervix.    PROCEDURE IN DETAIL:  After appropriate consents had been obtained, the patient   was taken to the Operating Room and placed under adequate general anesthesia.    She was placed in lithotomy position, properly positioned in [facility] stirrups   and prepared for a vaginal procedure.  Weighted speculum was put in place.  The   bladder was emptied of a small amount of clear yellow urine.  Tenaculum was   placed on the anterior lip of the cervix and rubber coated sidewall retractor   was put in place.  The cervix was then injected with a dilute vasopressin   solution and solution of Lugol's was placed on the cervix revealing nonstaining   area at 12 o'clock.  Using medium loop, a loop exocervical  excision procedure was performed   excising a cone-shaped wedge of tissue, which was sent to Pathology with a   suture marking 12 o'clock.  An additional specimen at 12 o'clock was excised and   sent to Pathology.  Endocervical curettage was then performed and tissue sent   to Pathology.  The cone bed was then electrocoagulated achieving excellent   hemostasis.  Tenaculum was then removed from the cervix.  The sidewalls were   examined.  There was no bleeding and the patient was taken down from lithotomy   position, sent to Recovery where she will be sent home when she is ambulating,   voiding and tolerating oral pain medications.  She will be seen back in the   office in about four weeks.  She is to call prior to that time if she has any   complications including excessive pain, bleeding or fever.      /STEVE  dd: 06/15/2018 09:39:30  ([email or web address])  td: 06/15/2018 15:27:21 (JEAN)  Doc ID   #[id number]  Job ID #[id number]    CC:

## 2018-06-18 NOTE — PROGRESS NOTES
Transitional Care Note  Subjective:       Patient ID: Holly Patel is a 27 y.o. female.  Chief Complaint: Hospital Follow Up    Family and/or Caretaker present at visit?  Yes.  Diagnostic tests reviewed/disposition: No diagnosic tests pending after this hospitalization.  Disease/illness education: HTN, pneumonia    Home health/community services discussion/referrals: Patient does not have home health established from hospital visit.  They do not need home health.  If needed, we will set up home health for the patient.   Establishment or re-establishment of referral orders for community resources: No other necessary community resources.   Discussion with other health care providers: Will send a copy of this note to Pt's PCP Dr Sosa.   Pt here for hospital follow up.  Went to ER 5/30 with fever.  Thought to have RLL pneumonia and sepsis from Dialysis access.  Pt has since been back to ER 6/8 with sinusitis and dental disease and HTN and had a LEEP by GYN on Friday.  Pt states that she is compliant with her BP meds.  Still with some cough at night.  Goes to Dialysis [facility] and Sat at [facility] on [facility].  Pt states that she has had some vomiting and some BR blood in vomit. Not taking her Pepcid.  No abd pain.  Has not seen GI in past        Review of Systems   Constitutional: Negative for activity change, appetite change, diaphoresis and fatigue.   Respiratory: Positive for cough. Negative for chest tightness and shortness of breath.         More at night     Cardiovascular: Negative for chest pain and palpitations.   Gastrointestinal: Positive for vomiting. Negative for abdominal pain, blood in stool, constipation, diarrhea and nausea.   Musculoskeletal: Negative for arthralgias and myalgias.   Skin: Negative for rash.   Hematological: Negative for adenopathy.   Psychiatric/Behavioral: Negative for sleep disturbance.       Objective:      Physical Exam   Constitutional: She is oriented to person,  place, and time. She appears well-developed and well-nourished. No distress.   HENT:   Head: Normocephalic and atraumatic.   Cardiovascular: Normal rate and regular rhythm.    Murmur heard.  Access noted to left arm     Pulmonary/Chest: Effort normal and breath sounds normal. She has no rales.   Neurological: She is alert and oriented to person, place, and time.   Skin: Skin is warm and dry. Capillary refill takes less than 2 seconds. No rash noted. She is not diaphoretic. No erythema. No pallor.   Psychiatric: She has a normal mood and affect. Her behavior is normal. Judgment and thought content normal.   Nursing note and vitals reviewed.      Assessment:       1. Pneumonia due to infectious organism, unspecified laterality, unspecified part of lung    2. Renovascular hypertension    3. Gastritis, presence of bleeding unspecified, unspecified chronicity, unspecified gastritis type    4. Hematemesis with nausea        Plan:       Repeat CXR today will call with results as pt does not use her [facility]    Increase Catapres patch dose, new RX sent.  Pt to f/u with Dr Sosa in 2 weeks for medication adjustments/ BP      Resume Pepcid see GI to determine source of GI bleed    Cont Dialysis    Pt and mom verb und   ER precautions given    Holly was seen today for hospital follow up.    Diagnoses and all orders for this visit:    Pneumonia due to infectious organism, unspecified laterality, unspecified part of lung  Comments:  RLL from admission follow up    Orders:  -     X-Ray Chest PA And Lateral; Future    Renovascular hypertension  Comments:  increase Catapres patch f/u Sheila 2 weeks    Orders:  -     cloNIDine 0.3 mg/24 hr td ptwk (CATAPRES) 0.3 mg/24 hr; Place 1 patch onto the skin every 7 days.    Gastritis, presence of bleeding unspecified, unspecified chronicity, unspecified gastritis type    Hematemesis with nausea  Comments:  resume Pepcid GI referral placed ER precautiosn given    Orders:  -      famotidine (PEPCID) 40 MG tablet; Take 1 tablet (40 mg total) by mouth once daily.  -     Ambulatory consult to Gastroenterology      Patient Instructions   Change dose of your blood pressure patch and follow up with Dr Sosa in 2 weeks to assess your blood pressure.  New prescription sent to [facility]    Continue all other medications    Chest xray today I will call you with your results      Call for any concerns    Schedule with GI    Resume Pepcid 40 mg a day

## 2018-06-19 ENCOUNTER — TELEPHONE (OUTPATIENT)
Dept: OBSTETRICS AND GYNECOLOGY | Facility: CLINIC | Age: 28
End: 2018-06-19

## 2018-06-19 ENCOUNTER — ANESTHESIA EVENT (OUTPATIENT)
Dept: SURGERY | Facility: OTHER | Age: 28
DRG: 907 | End: 2018-06-19
Payer: MEDICARE

## 2018-06-19 ENCOUNTER — HOSPITAL ENCOUNTER (INPATIENT)
Facility: OTHER | Age: 28
LOS: 1 days | Discharge: HOME | DRG: 907 | End: 2018-06-20
Attending: EMERGENCY MEDICINE | Admitting: OBSTETRICS & GYNECOLOGY
Payer: MEDICARE

## 2018-06-19 ENCOUNTER — ANESTHESIA (OUTPATIENT)
Dept: SURGERY | Facility: OTHER | Age: 28
DRG: 907 | End: 2018-06-19
Payer: MEDICARE

## 2018-06-19 DIAGNOSIS — N99.820 POSTOPERATIVE VAGINAL BLEEDING FOLLOWING GENITOURINARY PROCEDURE: ICD-10-CM

## 2018-06-19 DIAGNOSIS — D69.6 THROMBOCYTOPENIA: ICD-10-CM

## 2018-06-19 DIAGNOSIS — D72.819 LEUKOPENIA, UNSPECIFIED TYPE: ICD-10-CM

## 2018-06-19 DIAGNOSIS — D64.9 ANEMIA, UNSPECIFIED TYPE: Primary | ICD-10-CM

## 2018-06-19 DIAGNOSIS — N93.9 VAGINAL BLEEDING: ICD-10-CM

## 2018-06-19 PROBLEM — F32.A DEPRESSION: Status: ACTIVE | Noted: 2018-06-19

## 2018-06-19 LAB
ABO + RH BLD: NORMAL
ALBUMIN SERPL BCP-MCNC: 2.7 G/DL
ALP SERPL-CCNC: 560 U/L
ALT SERPL W/O P-5'-P-CCNC: 18 U/L
ANION GAP SERPL CALC-SCNC: 7 MMOL/L
ANISOCYTOSIS BLD QL SMEAR: SLIGHT
APTT BLDCRRT: 29.4 SEC
AST SERPL-CCNC: 47 U/L
BASOPHILS NFR BLD: 0 %
BILIRUB SERPL-MCNC: 0.5 MG/DL
BLD GP AB SCN CELLS X3 SERPL QL: NORMAL
BLD PROD TYP BPU: NORMAL
BLOOD UNIT EXPIRATION DATE: NORMAL
BLOOD UNIT TYPE CODE: 5100
BLOOD UNIT TYPE: NORMAL
BUN SERPL-MCNC: 15 MG/DL
CALCIUM SERPL-MCNC: 8.8 MG/DL
CHLORIDE SERPL-SCNC: 97 MMOL/L
CO2 SERPL-SCNC: 35 MMOL/L
CODING SYSTEM: NORMAL
CREAT SERPL-MCNC: 3.6 MG/DL
DIFFERENTIAL METHOD: ABNORMAL
DISPENSE STATUS: NORMAL
EOSINOPHIL NFR BLD: 18 %
ERYTHROCYTE [DISTWIDTH] IN BLOOD BY AUTOMATED COUNT: 17.8 %
EST. GFR  (AFRICAN AMERICAN): 19 ML/MIN/1.73 M^2
EST. GFR  (NON AFRICAN AMERICAN): 16 ML/MIN/1.73 M^2
GLUCOSE SERPL-MCNC: 97 MG/DL
HCT VFR BLD AUTO: 17.6 %
HGB BLD-MCNC: 5.6 G/DL
HYPOCHROMIA BLD QL SMEAR: ABNORMAL
INR PPP: 1
LYMPHOCYTES NFR BLD: 22 %
MCH RBC QN AUTO: 26.3 PG
MCHC RBC AUTO-ENTMCNC: 31.8 G/DL
MCV RBC AUTO: 83 FL
MONOCYTES NFR BLD: 6 %
NEUTROPHILS NFR BLD: 54 %
NUM UNITS TRANS PACKED RBC: NORMAL
NUM UNITS TRANS PACKED RBC: NORMAL
PLATELET # BLD AUTO: 54 K/UL
PLATELET BLD QL SMEAR: ABNORMAL
PMV BLD AUTO: 9.3 FL
POIKILOCYTOSIS BLD QL SMEAR: SLIGHT
POLYCHROMASIA BLD QL SMEAR: ABNORMAL
POTASSIUM SERPL-SCNC: 3 MMOL/L
PROT SERPL-MCNC: 7.7 G/DL
PROTHROMBIN TIME: 11.4 SEC
RBC # BLD AUTO: 2.13 M/UL
SCHISTOCYTES BLD QL SMEAR: ABNORMAL
SODIUM SERPL-SCNC: 139 MMOL/L
TRANS PLATPHERESIS VOL PATIENT: NORMAL ML
WBC # BLD AUTO: 1.5 K/UL

## 2018-06-19 PROCEDURE — 36430 TRANSFUSION BLD/BLD COMPNT: CPT

## 2018-06-19 PROCEDURE — 36000707: Performed by: OBSTETRICS & GYNECOLOGY

## 2018-06-19 PROCEDURE — 37000008 HC ANESTHESIA 1ST 15 MINUTES: Performed by: OBSTETRICS & GYNECOLOGY

## 2018-06-19 PROCEDURE — 63600175 PHARM REV CODE 636 W HCPCS: Performed by: NURSE ANESTHETIST, CERTIFIED REGISTERED

## 2018-06-19 PROCEDURE — 25000003 PHARM REV CODE 250: Performed by: ANESTHESIOLOGY

## 2018-06-19 PROCEDURE — 27201423 OPTIME MED/SURG SUP & DEVICES STERILE SUPPLY: Performed by: OBSTETRICS & GYNECOLOGY

## 2018-06-19 PROCEDURE — 71000033 HC RECOVERY, INTIAL HOUR: Performed by: OBSTETRICS & GYNECOLOGY

## 2018-06-19 PROCEDURE — 25000003 PHARM REV CODE 250: Performed by: STUDENT IN AN ORGANIZED HEALTH CARE EDUCATION/TRAINING PROGRAM

## 2018-06-19 PROCEDURE — P9035 PLATELET PHERES LEUKOREDUCED: HCPCS

## 2018-06-19 PROCEDURE — 0UQC7ZZ REPAIR CERVIX, VIA NATURAL OR ARTIFICIAL OPENING: ICD-10-PCS | Performed by: OBSTETRICS & GYNECOLOGY

## 2018-06-19 PROCEDURE — S0030 INJECTION, METRONIDAZOLE: HCPCS | Performed by: STUDENT IN AN ORGANIZED HEALTH CARE EDUCATION/TRAINING PROGRAM

## 2018-06-19 PROCEDURE — 36415 COLL VENOUS BLD VENIPUNCTURE: CPT

## 2018-06-19 PROCEDURE — 85027 COMPLETE CBC AUTOMATED: CPT

## 2018-06-19 PROCEDURE — 80053 COMPREHEN METABOLIC PANEL: CPT

## 2018-06-19 PROCEDURE — 85007 BL SMEAR W/DIFF WBC COUNT: CPT

## 2018-06-19 PROCEDURE — 80197 ASSAY OF TACROLIMUS: CPT

## 2018-06-19 PROCEDURE — 63600175 PHARM REV CODE 636 W HCPCS

## 2018-06-19 PROCEDURE — 63600175 PHARM REV CODE 636 W HCPCS: Performed by: STUDENT IN AN ORGANIZED HEALTH CARE EDUCATION/TRAINING PROGRAM

## 2018-06-19 PROCEDURE — 99223 1ST HOSP IP/OBS HIGH 75: CPT | Mod: ,,, | Performed by: HOSPITALIST

## 2018-06-19 PROCEDURE — 86850 RBC ANTIBODY SCREEN: CPT

## 2018-06-19 PROCEDURE — 11000001 HC ACUTE MED/SURG PRIVATE ROOM

## 2018-06-19 PROCEDURE — 12020 TX SUPFC WND DEHSN SMPL CLSR: CPT | Mod: 78,,, | Performed by: OBSTETRICS & GYNECOLOGY

## 2018-06-19 PROCEDURE — 36000706: Performed by: OBSTETRICS & GYNECOLOGY

## 2018-06-19 PROCEDURE — 99285 EMERGENCY DEPT VISIT HI MDM: CPT

## 2018-06-19 PROCEDURE — 85730 THROMBOPLASTIN TIME PARTIAL: CPT

## 2018-06-19 PROCEDURE — 99223 1ST HOSP IP/OBS HIGH 75: CPT | Mod: AI,24,GC, | Performed by: OBSTETRICS & GYNECOLOGY

## 2018-06-19 PROCEDURE — 85610 PROTHROMBIN TIME: CPT

## 2018-06-19 PROCEDURE — 86920 COMPATIBILITY TEST SPIN: CPT

## 2018-06-19 PROCEDURE — P9038 RBC IRRADIATED: HCPCS

## 2018-06-19 PROCEDURE — 25000003 PHARM REV CODE 250: Performed by: NURSE ANESTHETIST, CERTIFIED REGISTERED

## 2018-06-19 PROCEDURE — 71000039 HC RECOVERY, EACH ADD'L HOUR: Performed by: OBSTETRICS & GYNECOLOGY

## 2018-06-19 PROCEDURE — 37000009 HC ANESTHESIA EA ADD 15 MINS: Performed by: OBSTETRICS & GYNECOLOGY

## 2018-06-19 RX ORDER — HYDRALAZINE HYDROCHLORIDE 20 MG/ML
INJECTION INTRAMUSCULAR; INTRAVENOUS
Status: DISCONTINUED | OUTPATIENT
Start: 2018-06-19 | End: 2018-06-19

## 2018-06-19 RX ORDER — SUCCINYLCHOLINE CHLORIDE 20 MG/ML
INJECTION INTRAMUSCULAR; INTRAVENOUS
Status: DISCONTINUED | OUTPATIENT
Start: 2018-06-19 | End: 2018-06-19

## 2018-06-19 RX ORDER — METRONIDAZOLE 500 MG/100ML
500 INJECTION, SOLUTION INTRAVENOUS 2 TIMES DAILY
Status: DISCONTINUED | OUTPATIENT
Start: 2018-06-19 | End: 2018-06-20 | Stop reason: HOSPADM

## 2018-06-19 RX ORDER — MIDAZOLAM HYDROCHLORIDE 1 MG/ML
INJECTION INTRAMUSCULAR; INTRAVENOUS
Status: DISCONTINUED | OUTPATIENT
Start: 2018-06-19 | End: 2018-06-19

## 2018-06-19 RX ORDER — LEVETIRACETAM 500 MG/1
500 TABLET ORAL 2 TIMES DAILY
Status: DISCONTINUED | OUTPATIENT
Start: 2018-06-19 | End: 2018-06-20 | Stop reason: HOSPADM

## 2018-06-19 RX ORDER — OXYCODONE HYDROCHLORIDE 5 MG/1
5 TABLET ORAL
Status: DISCONTINUED | OUTPATIENT
Start: 2018-06-19 | End: 2018-06-19

## 2018-06-19 RX ORDER — ONDANSETRON 8 MG/1
8 TABLET, ORALLY DISINTEGRATING ORAL EVERY 8 HOURS PRN
Status: DISCONTINUED | OUTPATIENT
Start: 2018-06-19 | End: 2018-06-20 | Stop reason: HOSPADM

## 2018-06-19 RX ORDER — ONDANSETRON 2 MG/ML
4 INJECTION INTRAMUSCULAR; INTRAVENOUS DAILY PRN
Status: DISCONTINUED | OUTPATIENT
Start: 2018-06-19 | End: 2018-06-19

## 2018-06-19 RX ORDER — DIPHENHYDRAMINE HYDROCHLORIDE 50 MG/ML
25 INJECTION INTRAMUSCULAR; INTRAVENOUS EVERY 6 HOURS PRN
Status: DISCONTINUED | OUTPATIENT
Start: 2018-06-19 | End: 2018-06-19

## 2018-06-19 RX ORDER — METRONIDAZOLE 500 MG/100ML
500 INJECTION, SOLUTION INTRAVENOUS
Status: DISCONTINUED | OUTPATIENT
Start: 2018-06-19 | End: 2018-06-19

## 2018-06-19 RX ORDER — LABETALOL HYDROCHLORIDE 5 MG/ML
10 INJECTION, SOLUTION INTRAVENOUS
Status: DISCONTINUED | OUTPATIENT
Start: 2018-06-19 | End: 2018-06-20 | Stop reason: HOSPADM

## 2018-06-19 RX ORDER — DESMOPRESSIN ACETATE 4 UG/ML
INJECTION, SOLUTION INTRAVENOUS; SUBCUTANEOUS
Status: DISCONTINUED | OUTPATIENT
Start: 2018-06-19 | End: 2018-06-19

## 2018-06-19 RX ORDER — MEPERIDINE HYDROCHLORIDE 50 MG/ML
12.5 INJECTION INTRAMUSCULAR; INTRAVENOUS; SUBCUTANEOUS ONCE AS NEEDED
Status: DISCONTINUED | OUTPATIENT
Start: 2018-06-19 | End: 2018-06-19

## 2018-06-19 RX ORDER — PREDNISONE 1 MG/1
1 TABLET ORAL EVERY OTHER DAY
Status: DISCONTINUED | OUTPATIENT
Start: 2018-06-20 | End: 2018-06-20

## 2018-06-19 RX ORDER — SODIUM CHLORIDE 0.9 % (FLUSH) 0.9 %
3 SYRINGE (ML) INJECTION
Status: DISCONTINUED | OUTPATIENT
Start: 2018-06-19 | End: 2018-06-20 | Stop reason: HOSPADM

## 2018-06-19 RX ORDER — LACOSAMIDE 50 MG/1
50 TABLET ORAL EVERY 12 HOURS
Status: DISCONTINUED | OUTPATIENT
Start: 2018-06-19 | End: 2018-06-20 | Stop reason: HOSPADM

## 2018-06-19 RX ORDER — MYCOPHENOLATE MOFETIL 250 MG/1
1000 CAPSULE ORAL 2 TIMES DAILY
Status: DISCONTINUED | OUTPATIENT
Start: 2018-06-19 | End: 2018-06-20 | Stop reason: HOSPADM

## 2018-06-19 RX ORDER — FENTANYL CITRATE 50 UG/ML
25 INJECTION, SOLUTION INTRAMUSCULAR; INTRAVENOUS EVERY 5 MIN PRN
Status: DISCONTINUED | OUTPATIENT
Start: 2018-06-19 | End: 2018-06-19

## 2018-06-19 RX ORDER — ACETAMINOPHEN 325 MG/1
650 TABLET ORAL EVERY 6 HOURS PRN
Status: DISCONTINUED | OUTPATIENT
Start: 2018-06-19 | End: 2018-06-20 | Stop reason: HOSPADM

## 2018-06-19 RX ORDER — LIDOCAINE HCL/PF 100 MG/5ML
SYRINGE (ML) INTRAVENOUS
Status: DISCONTINUED | OUTPATIENT
Start: 2018-06-19 | End: 2018-06-19

## 2018-06-19 RX ORDER — FENTANYL CITRATE 50 UG/ML
INJECTION, SOLUTION INTRAMUSCULAR; INTRAVENOUS
Status: DISCONTINUED | OUTPATIENT
Start: 2018-06-19 | End: 2018-06-19

## 2018-06-19 RX ORDER — DIPHENHYDRAMINE HYDROCHLORIDE 50 MG/ML
INJECTION INTRAMUSCULAR; INTRAVENOUS
Status: COMPLETED
Start: 2018-06-19 | End: 2018-06-19

## 2018-06-19 RX ORDER — LABETALOL HYDROCHLORIDE 5 MG/ML
INJECTION, SOLUTION INTRAVENOUS
Status: DISCONTINUED | OUTPATIENT
Start: 2018-06-19 | End: 2018-06-19

## 2018-06-19 RX ORDER — LABETALOL HYDROCHLORIDE 5 MG/ML
10 INJECTION, SOLUTION INTRAVENOUS ONCE
Status: COMPLETED | OUTPATIENT
Start: 2018-06-19 | End: 2018-06-19

## 2018-06-19 RX ORDER — LABETALOL 200 MG/1
400 TABLET, FILM COATED ORAL EVERY 8 HOURS
Status: DISCONTINUED | OUTPATIENT
Start: 2018-06-19 | End: 2018-06-20 | Stop reason: HOSPADM

## 2018-06-19 RX ORDER — NIFEDIPINE 30 MG/1
60 TABLET, EXTENDED RELEASE ORAL DAILY
Status: DISCONTINUED | OUTPATIENT
Start: 2018-06-20 | End: 2018-06-20 | Stop reason: HOSPADM

## 2018-06-19 RX ORDER — TACROLIMUS 1 MG/1
7 CAPSULE ORAL 2 TIMES DAILY
Status: DISCONTINUED | OUTPATIENT
Start: 2018-06-19 | End: 2018-06-20 | Stop reason: HOSPADM

## 2018-06-19 RX ORDER — PROPOFOL 10 MG/ML
VIAL (ML) INTRAVENOUS
Status: DISCONTINUED | OUTPATIENT
Start: 2018-06-19 | End: 2018-06-19

## 2018-06-19 RX ORDER — HYDRALAZINE HYDROCHLORIDE 25 MG/1
50 TABLET, FILM COATED ORAL EVERY 8 HOURS
Status: DISCONTINUED | OUTPATIENT
Start: 2018-06-19 | End: 2018-06-20 | Stop reason: HOSPADM

## 2018-06-19 RX ORDER — CITALOPRAM 20 MG/1
20 TABLET, FILM COATED ORAL DAILY
Status: DISCONTINUED | OUTPATIENT
Start: 2018-06-20 | End: 2018-06-20 | Stop reason: HOSPADM

## 2018-06-19 RX ORDER — HYDROCODONE BITARTRATE AND ACETAMINOPHEN 500; 5 MG/1; MG/1
TABLET ORAL
Status: DISCONTINUED | OUTPATIENT
Start: 2018-06-19 | End: 2018-06-20 | Stop reason: HOSPADM

## 2018-06-19 RX ORDER — SODIUM CHLORIDE 9 MG/ML
INJECTION, SOLUTION INTRAVENOUS CONTINUOUS PRN
Status: DISCONTINUED | OUTPATIENT
Start: 2018-06-19 | End: 2018-06-19

## 2018-06-19 RX ORDER — HYDRALAZINE HYDROCHLORIDE 20 MG/ML
5 INJECTION INTRAMUSCULAR; INTRAVENOUS EVERY 6 HOURS PRN
Status: DISCONTINUED | OUTPATIENT
Start: 2018-06-19 | End: 2018-06-20 | Stop reason: HOSPADM

## 2018-06-19 RX ORDER — ACETAMINOPHEN AND CODEINE PHOSPHATE 300; 30 MG/1; MG/1
1 TABLET ORAL EVERY 4 HOURS PRN
Status: DISCONTINUED | OUTPATIENT
Start: 2018-06-19 | End: 2018-06-20 | Stop reason: HOSPADM

## 2018-06-19 RX ORDER — SODIUM CHLORIDE 9 MG/ML
INJECTION, SOLUTION INTRAVENOUS CONTINUOUS
Status: DISCONTINUED | OUTPATIENT
Start: 2018-06-19 | End: 2018-06-19

## 2018-06-19 RX ADMIN — DIPHENHYDRAMINE HYDROCHLORIDE 25 MG: 50 INJECTION, SOLUTION INTRAMUSCULAR; INTRAVENOUS at 07:06

## 2018-06-19 RX ADMIN — LEVETIRACETAM 500 MG: 500 TABLET ORAL at 09:06

## 2018-06-19 RX ADMIN — HYDRALAZINE HYDROCHLORIDE 20 MG: 20 INJECTION INTRAMUSCULAR; INTRAVENOUS at 04:06

## 2018-06-19 RX ADMIN — TACROLIMUS 7 MG: 1 CAPSULE ORAL at 07:06

## 2018-06-19 RX ADMIN — LABETALOL HYDROCHLORIDE 10 MG: 5 INJECTION, SOLUTION INTRAVENOUS at 06:06

## 2018-06-19 RX ADMIN — SODIUM CHLORIDE: 0.9 INJECTION, SOLUTION INTRAVENOUS at 03:06

## 2018-06-19 RX ADMIN — CARBOXYMETHYLCELLULOSE SODIUM 2 DROP: 2.5 SOLUTION/ DROPS OPHTHALMIC at 04:06

## 2018-06-19 RX ADMIN — DESMOPRESSIN ACETATE 15 MCG: 4 SOLUTION INTRAVENOUS at 04:06

## 2018-06-19 RX ADMIN — LIDOCAINE HYDROCHLORIDE 100 MG: 20 INJECTION, SOLUTION INTRAVENOUS at 04:06

## 2018-06-19 RX ADMIN — HYDRALAZINE HYDROCHLORIDE 50 MG: 25 TABLET, FILM COATED ORAL at 09:06

## 2018-06-19 RX ADMIN — LABETALOL HYDROCHLORIDE 400 MG: 200 TABLET, FILM COATED ORAL at 09:06

## 2018-06-19 RX ADMIN — LABETALOL HYDROCHLORIDE 10 MG: 5 INJECTION, SOLUTION INTRAVENOUS at 04:06

## 2018-06-19 RX ADMIN — DIPHENHYDRAMINE HYDROCHLORIDE 25 MG: 50 INJECTION INTRAMUSCULAR; INTRAVENOUS at 07:06

## 2018-06-19 RX ADMIN — FENTANYL CITRATE 50 MCG: 50 INJECTION, SOLUTION INTRAMUSCULAR; INTRAVENOUS at 04:06

## 2018-06-19 RX ADMIN — MIDAZOLAM HYDROCHLORIDE 2 MG: 1 INJECTION, SOLUTION INTRAMUSCULAR; INTRAVENOUS at 04:06

## 2018-06-19 RX ADMIN — LABETALOL HYDROCHLORIDE 10 MG: 5 INJECTION, SOLUTION INTRAVENOUS at 05:06

## 2018-06-19 RX ADMIN — LACOSAMIDE 50 MG: 50 TABLET, FILM COATED ORAL at 09:06

## 2018-06-19 RX ADMIN — PROPOFOL 140 MG: 10 INJECTION, EMULSION INTRAVENOUS at 04:06

## 2018-06-19 RX ADMIN — MYCOPHENOLATE MOFETIL 1000 MG: 250 CAPSULE ORAL at 09:06

## 2018-06-19 RX ADMIN — SUCCINYLCHOLINE CHLORIDE 120 MG: 20 INJECTION, SOLUTION INTRAMUSCULAR; INTRAVENOUS at 04:06

## 2018-06-19 RX ADMIN — SODIUM CHLORIDE: 0.9 INJECTION, SOLUTION INTRAVENOUS at 07:06

## 2018-06-19 RX ADMIN — FENTANYL CITRATE 100 MCG: 50 INJECTION, SOLUTION INTRAMUSCULAR; INTRAVENOUS at 04:06

## 2018-06-19 RX ADMIN — PROPOFOL 30 MG: 10 INJECTION, EMULSION INTRAVENOUS at 04:06

## 2018-06-19 RX ADMIN — DOXYCYCLINE 100 MG: 100 INJECTION, POWDER, LYOPHILIZED, FOR SOLUTION INTRAVENOUS at 04:06

## 2018-06-19 RX ADMIN — METRONIDAZOLE 500 MG: 500 SOLUTION INTRAVENOUS at 09:06

## 2018-06-19 NOTE — TRANSFER OF CARE
"Anesthesia Transfer of Care Note    Patient: Holly Patel    Procedure(s) Performed: Procedure(s) (LRB):  Exam under anesthesia (N/A)  SUTURE REPAIR,CERVIX    Patient location: PACU    Anesthesia Type: general    Transport from OR: Transported from OR on 2-3 L/min O2 by NC with adequate spontaneous ventilation    Post pain: adequate analgesia    Post assessment: no apparent anesthetic complications    Post vital signs: stable    Level of consciousness: awake    Nausea/Vomiting: no nausea/vomiting    Complications: none    Transfer of care protocol was followed      Last vitals:   Visit Vitals  BP (!) 177/119   Pulse 86   Temp 36.4 °C (97.6 °F) (Oral)   Resp 16   Ht 5' 5" (1.651 m)   Wt 50.8 kg (112 lb)   LMP 05/30/2018   SpO2 100%   BMI 18.64 kg/m²     "

## 2018-06-19 NOTE — ASSESSMENT & PLAN NOTE
- CBC on Admit 5.6/17.6, down from 7.1/22.5 4 days prior  - VSS stable on Admit  - 2 units PRBCs ordered in ED  - Consents signed  - Patient complains of fatigues which is chronic in nature but otherwise denies signs or symptoms of anemia

## 2018-06-19 NOTE — ANESTHESIA PREPROCEDURE EVALUATION
06/19/2018  Holly Patel is a 27 y.o., female.    Pre-op Assessment    I have reviewed the Patient Summary Reports.     I have reviewed the Nursing Notes.   I have reviewed the Medications.     Review of Systems  Anesthesia Hx:  Denies Family Hx of Anesthesia complications.   Denies Personal Hx of Anesthesia complications.   Social:  Non-Smoker    Hematology/Oncology:     Oncology Normal    -- Anemia: Hematology Comments: Pancytopenia.  Will check CBC on morning of surgery.  Pt may need blood products (platelets).    Cardiovascular:   Exercise tolerance: good Hypertension    Pulmonary:   Pneumonia Recently hospitalized with pneumonia and sepsis. D/C'd 5/30.  On IV antibiotics during dialysis. Pt denies, cough, SOB, or chest pain.  Surgery on 6/15/18   Renal/:   Chronic Renal Disease, ESRD, Dialysis    Hepatic/GI:   Liver Disease, (s/p liver transplant. Presently with chronic rejection.  )    Neurological:   Seizures, well controlled    Endocrine:   Secondary hyperparathyroidism   Psych:   depression          Physical Exam  General:  Cachexia    Airway/Jaw/Neck:  Airway Findings: Mouth Opening: Normal Tongue: Normal  General Airway Assessment: Adult, Possible difficult intubation  Mallampati: II  TM Distance: 4 - 6 cm      Dental:  Dental Findings: In tact   Chest/Lungs:  Chest/Lungs Findings: Rhonchi         Mental Status:  Mental Status Findings:  Alert and Oriented, Cooperative         Anesthesia Plan  Type of Anesthesia, risks & benefits discussed:  Anesthesia Type:  general  Patient's Preference:   Intra-op Monitoring Plan: standard ASA monitors  Intra-op Monitoring Plan Comments:   Post Op Pain Control Plan:   Post Op Pain Control Plan Comments:   Induction:   IV  Beta Blocker:         Informed Consent: Patient understands risks and agrees with Anesthesia plan.  Questions answered. Anesthesia  consent signed with patient.  ASA Score: 4  emergent   Day of Surgery Review of History & Physical:    H&P update referred to the surgeon.     Anesthesia Plan Notes:   Pt may require platelets or PRBC's.  If she continues to improve since d/c from recent hospital stay    Will receive platelets in surgery        Ready For Surgery From Anesthesia Perspective.

## 2018-06-19 NOTE — TELEPHONE ENCOUNTER
Called patient to advise of biopsy /LEEP results. She states she began bleeding yesterday soaking a pad or more an hour.. Advised that we should see her today asap. She states cannot get a ride until later. Advised to come to the ED as soon as possible.  She is currently in dialysis clinic.

## 2018-06-19 NOTE — ED NOTES
Patient resting quietly in bed at this time.  Position readjusted.  Denies needs at this time.  Encouraged to call with any needs.

## 2018-06-19 NOTE — ASSESSMENT & PLAN NOTE
- Secondary to LEEP Procedure 6/15/2018  - Thrombocytopenia likely contributory factor  - Unable to manage vaginal bleeding in ED with Monsels and pressure  - Patient to OR for EUA for cauterization of cervical bed as well as other possible surgical management of cervical bleeding  - Consents signed  - Case request placed  - Doxy 100 BID and Flagyl 500 BID

## 2018-06-19 NOTE — ASSESSMENT & PLAN NOTE
- S/P Transplant in 1992  - On Chronic Immune Suppression  - On Prednisone, Tacrolimus and Mycophenolate

## 2018-06-19 NOTE — HPI
Ms. Patel is a 27 year old  with numerous co-morbidities including ESRD - Hemodialysis dependent, s/p liver transplant (, on chronic immune suppression), Anemia and and thrombocytopenia, seizure disorder and depression.  The patient underwent an uncomplicated LEEP procedure on 6/15/2018. She was subsequently discharged without complications.  She stated that she had light vaginal bleeding the night of the procedure but she noticed significant the day following the surgery.  Since that time she has experienced persistent bleeding, saturating more than a pad per hour on multiple occasions over the past few days.  She then contacted her primary OBGYN today who suggested that she come to the ED for evaluation.      In the ED the patient was found to be in no acute distress but with noted significant vaginal bleeding.  Her CBC was grossly abnormal with a WBC of 1.5 and an H/H of 5.6/17.6 and platelets of 54.  On pelvic exam the patient had noted persistent bleeding from the cervix which was not controlled by pressure or Monsels in the ED.  Two units of PRBCs were ordered in the ED and the decision was made to do an exam under anesthesia for possible surgical management of the persistent vaginal bleeding.

## 2018-06-19 NOTE — CONSULTS
[facility]  Obstetrics & Gynecology  Consult Note    Patient Name: Holly Patel  MRN: [id number]  Admission Date: 2018  Hospital Length of Stay: 0 days  Code Status: Prior  Primary Care Provider: Stan Sosa MD  Principal Problem: Vaginal bleeding    Inpatient consult to Obstetrics / Gynecology  Consult performed by: GENOVEVA DICKINSON  Consult ordered by: JUAN JOE        Subjective:     Chief Complaint: Vaginal Bleeding    History of Present Illness:  Ms. Patel is a 27 year old  with numerous co-morbidities including ESRD - Hemodialysis dependent, s/p liver transplant (, on chronic immune suppression), Anemia and and thrombocytopenia, seizure disorder and depression.  The patient underwent an uncomplicated LEEP procedure on 6/15/2018. She was subsequently discharged without complications.  She stated that she had light vaginal bleeding the night of the procedure but she noticed significant the day following the surgery.  Since that time she has experienced persistent bleeding, saturating more than a pad per hour on multiple occasions over the past few days.  She then contacted her primary OBGYN today who suggested that she come to the ED for evaluation.      In the ED the patient was found to be in no acute distress but with noted significant vaginal bleeding.  Her CBC was grossly abnormal with a WBC of 1.5 and an H/H of 5.6/17.6 and platelets of 54.  On pelvic exam the patient had noted persistent bleeding from the cervix which was not controlled by pressure or Monsels in the ED.  Two units of PRBCs were ordered in the ED and the decision was made to do an exam under anesthesia for possible surgical management of the persistent vaginal bleeding.           Obstetric History       T0      L2     SAB0   TAB0   Ectopic0   Multiple0   Live Births2       # Outcome Date GA Lbr Peter/2nd Weight Sex Delivery Anes PTL Lv   2       CS-LTranv   JOANN   1        CS-LTranv   JOANN        Past Medical History:   Diagnosis Date    Anemia in ESRD (end-stage renal disease) 10/12/2015    dialysis tues, thursday, sat; access left arm    Chronic rejection of liver transplant 3/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 ()    Moderate protein-calorie malnutrition 2017    MRSA bacteremia 2017    Pneumonia     Prophylactic immunotherapy 2014    Renovascular hypertension 10/2/2015    Secondary hyperparathyroidism 2017    Seizures     Sialadenitis 3/21/2018    Thrombocytopenia 2016    Thrombocytopenia 2016     Past Surgical History:   Procedure Laterality Date     SECTION      x 2    CONIZATION OF CERVIX USING LOOP ELECTROSURGICAL EXCISION PROCEDURE (LEEP) N/A 6/15/2018    Procedure: CONIZATION-CERVICAL-LEEP;  Surgeon: Neelam Marroquin MD;  Location: [facility];  Service: OB/GYN;  Laterality: N/A;    LIVER BIOPSY      LIVER TRANSPLANT  1992    TUBAL LIGATION         PTA Medications   Medication Sig    acetaminophen-codeine 300-30mg (TYLENOL #3) 300-30 mg Tab Take 1 tablet by mouth every 6 (six) hours as needed.    aspirin 81 MG Chew Take 1 tablet (81 mg total) by mouth once daily.    cinacalcet (SENSIPAR) 30 MG Tab Take 1 tablet (30 mg total) by mouth daily with breakfast.    citalopram (CELEXA) 20 MG tablet Take 1 tablet (20 mg total) by mouth once daily.    cloNIDine 0.3 mg/24 hr td ptwk (CATAPRES) 0.3 mg/24 hr Place 1 patch onto the skin every 7 days.    famotidine (PEPCID) 40 MG tablet Take 1 tablet (40 mg total)  by mouth once daily.    food supplemt, lactose-reduced (ENSURE ACTIVE HIGH PROTEIN) Liqd Take 236 mLs by mouth 2 (two) times daily.    hydrALAZINE (APRESOLINE) 50 MG tablet Take 1 tablet (50 mg total) by mouth every 8 (eight) hours.    labetalol (NORMODYNE) 200 MG tablet Take 2 tablets (400 mg total) by mouth every 8 (eight) hours.    lacosamide (VIMPAT) 50 mg Tab Take by mouth every 12 (twelve) hours.    levETIRAcetam (KEPPRA) 500 MG Tab Take 500 mg by mouth 2 (two) times daily.    loratadine (CLARITIN) 10 mg tablet Take 1 tablet (10 mg total) by mouth once daily.    montelukast (SINGULAIR) 10 mg tablet Take 1 tablet (10 mg total) by mouth every evening.    mycophenolate (CELLCEPT) 250 mg Cap Take 1,000 mg by mouth 2 (two) times daily.    NIFEdipine (PROCARDIA-XL) 60 MG (OSM) 24 hr tablet TAKE ONE(1) TABLET BY MOUTH EVERY DAY    NIFEdipine (PROCARDIA-XL) 90 MG (OSM) 24 hr tablet Take 1 tablet (90 mg total) by mouth once daily. (Patient taking differently: Take 60 mg by mouth once daily. )    ondansetron (ZOFRAN) 4 MG tablet Take 1 tablet (4 mg total) by mouth every 6 (six) hours as needed for Nausea.    predniSONE (DELTASONE) 1 MG tablet Take 1 mg by mouth every other day.    tacrolimus (PROGRAF) 1 MG Cap Take 7 capsules (7 mg total) by mouth every 12 (twelve) hours.    triamcinolone acetonide 0.1% (KENALOG) 0.1 % ointment AAA on arms, legs, and neck bid x 1-2 wks then prn flares only (Patient taking differently: Apply to affected area(s) on arms, legs, and neck twice daily x 1-2 wks then as needed for flares only)       Review of patient's allergies indicates:   Allergen Reactions    Chloral hydrate      Other reaction(s): Hallucinations  Other reaction(s): Hives    Hydrocodone Other (See Comments)     Mental status changes        Family History     Problem Relation (Age of Onset)    Hypertension Mother, Father        Social History Main Topics    Smoking status: Never Smoker    Smokeless  tobacco: Never Used    Alcohol use No    Drug use: No    Sexual activity: No     Review of Systems   Constitutional: Positive for fatigue. Negative for chills and fever.   Eyes: Negative for visual disturbance.   Respiratory: Negative for shortness of breath.    Cardiovascular: Negative for chest pain.   Gastrointestinal: Negative for diarrhea, nausea and vomiting.   Genitourinary: Positive for vaginal bleeding. Negative for dysuria, hematuria and vaginal discharge.   Skin:  Negative for rash.   Neurological: Negative for seizures, syncope and headaches.   Psychiatric/Behavioral: Negative.       Objective:     Vital Signs (Most Recent):  Temp: 97.6 °F (36.4 °C) (18 1150)  Pulse: 86 (18 1456)  Resp: 16 (18 1150)  BP: (!) 177/119 (18 1456)  SpO2: 100 % (18 1456) Vital Signs (24h Range):  Temp:  [97.6 °F (36.4 °C)] 97.6 °F (36.4 °C)  Pulse:  [86-96] 86  Resp:  [16] 16  SpO2:  [92 %-100 %] 100 %  BP: (141-184)/() 177/119     Weight: 50.8 kg (112 lb)  Body mass index is 18.64 kg/m².    Patient's last menstrual period was 2018.    Physical Exam:   Constitutional: She is oriented to person, place, and time. She appears well-developed and well-nourished. No distress.   Patient does not appear to be in acute distress.     HENT:   Head: Normocephalic and atraumatic.    Eyes: Conjunctivae are normal.    Neck: Neck supple.    Cardiovascular: Normal rate, regular rhythm and intact distal pulses.     Pulmonary/Chest: Effort normal. No respiratory distress.        Abdominal: Soft. She exhibits abdominal incision ( incision scar noted). She exhibits no distension. There is no tenderness. There is no rebound and no guarding.     Genitourinary: Vagina normal.       Genitourinary Comments: SSE: Normal external female genitalia, normal urethral meatus, normal vaginal rugae, normal vaginal mucosa, + vaginal odor, blood in vault, approximately ~ 150 in clots, black eschar present in  vaginal vault which was likely covering site of LEEP. Monsels solution placed over area of bleeding with direct pressure.  Hemostasis was not achieved with these interventions.  Normal physiologic discharge, no adnexal masses palpated on bimanual exam.                  Neurological: She is alert and oriented to person, place, and time.    Skin: Skin is warm and dry. She is not diaphoretic.    Psychiatric: She has a normal mood and affect. Her behavior is normal. Judgment and thought content normal.       Laboratory:  CBC:   Recent Labs  Lab 06/19/18  1220   WBC 1.50*   RBC 2.13*   HGB 5.6*   HCT 17.6*   PLT 54*   MCV 83   MCH 26.3*   MCHC 31.8*     CMP:   Recent Labs  Lab 06/19/18  1220   GLU 97   CALCIUM 8.8   ALBUMIN 2.7*   PROT 7.7      K 3.0*   CO2 35*   CL 97   BUN 15   CREATININE 3.6*   ALKPHOS 560*   ALT 18   AST 47*   BILITOT 0.5     I have personallly reviewed all pertinent lab results from the last 24 hours.      Assessment/Plan:     * Vaginal bleeding    - Secondary to LEEP Procedure 6/15/2018  - Thrombocytopenia likely contributory factor  - Unable to manage vaginal bleeding in ED with Monsels and pressure  - Patient to OR for EUA for cauterization of cervical bed as well as other possible surgical management of cervical bleeding  - Consents signed  - Case request placed  - Doxy 100 BID and Flagyl 500 BID         Depression    - Celexa 20 mg Daily        Anemia    - CBC on Admit 5.6/17.6, down from 7.1/22.5 4 days prior  - VSS stable on Admit  - 2 units PRBCs ordered in ED  - Consents signed  - Patient complains of fatigues which is chronic in nature but otherwise denies signs or symptoms of anemia        Pancytopenia    - Patient seen by Hematology/Oncology on 6/7/18  - Will consult Hospital Medicine team for assistance managing the patient's co-morbidities.          Seizures    - On Keppra 500 BID  - Patient endorses compliance  - Last Seizure of in February of this year        ESRD (end stage  renal disease) on dialysis    - On Hemodialysis (T, TH, SAT)  - Last HD - 6/19/2018  - Cr on Admit 3.6        Renovascular hypertension    - On Procardia 60 XL        Liver replaced by transplant    - S/P Transplant in 1992  - On Chronic Immune Suppression  - On Prednisone, Tacrolimus and Mycophenolate            Thank you for your consult. I will follow-up with patient. Please contact us if you have any additional questions.    Benedict Roblero MD  Obstetrics & Gynecology  [facility], [place], operated by [facility]

## 2018-06-19 NOTE — BRIEF OP NOTE
BRIEF OPERATIVE NOTE       SUMMARY      Surgery Date: 06/19/2018     SURGEON: Neelam Marroquin    ASSISTANT: Louis Roblero    PREOP DIAGNOSIS: Vaginal bleeding s/p Leep procedure    POSTOP DIAGNOSIS:  Same with uncontrolled hypertension and anticoagulation from Dialysis    PROCEDURES: Exam under anesthesia, Suture of cervical cone bed, Stay sutures placed at 3 and 9 oclock    ANESTHESIA: general    FINDINGS/KEY COMPONENTS: Small amount of oozing from anterior edge of cervix at 1 oclock, no active bleeding noted    ESTIMATED BLOOD LOSS: 5 cc     COMPLICATIONS: None noted    PATHOLOGY:   Specimens     None        Dictation #1  MRN:[id number]  [facility]:[id number]  [id number]

## 2018-06-19 NOTE — ED PROVIDER NOTES
Encounter Date: 6/19/2018       History     Chief Complaint   Patient presents with    Vaginal Bleeding     + bright red vaginal bleeding since LEEP sx of 6/15 pt reports saturation 32 pads yesterday. Denies dizziness or weakness, last dialysis this AM     Patient is a 27 y.o. female with a past medical history of liver transplant, ESRD on hemodialysis (Tues, Thurs, Sat), anemia, seizure disorder, presenting to the emergency department with complaints of heavy vaginal bleeding.  The patient states that she underwent a LEEP on 06/15/2018 by Dr. Marroquin.  She states she was discharged home the same day and had some mild spotting.  She reports that on 06/16/2018, she started experiencing heavier bleeding that has not stopped.  She states that she is saturating over 2 pads per hour with dark red blood.  She denies any significant pain or cramping.  She denies lightheadedness, shortness of breath, fatigue or other complaints. She states she spoke with her OBGYN today and was advised to immediately come to the emergency department.  She admits she underwent dialysis as scheduled today.  This is the extent of the patient's complaints at this time.         The history is provided by the patient.     Review of patient's allergies indicates:   Allergen Reactions    Chloral hydrate      Other reaction(s): Hallucinations  Other reaction(s): Hives    Hydrocodone Other (See Comments)     Mental status changes     Past Medical History:   Diagnosis Date    Anemia in ESRD (end-stage renal disease) 10/12/2015    dialysis tues, thursday, sat; access left arm    Chronic rejection of liver transplant 3/22/2016    Depression     Encounter for blood transfusion     ESRD on hemodialysis 9/30/2015    History of recent hospitalization 05/2018    pneumonia    History of splenomegaly 4/12/2016    Immunosuppressed 8/5/2017    Iron deficiency anemia secondary to inadequate dietary iron intake 8/16/2017    [facility]   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 ()    Moderate protein-calorie malnutrition 2017    MRSA bacteremia 2017    Pneumonia     Prophylactic immunotherapy 2014    Renovascular hypertension 10/2/2015    Secondary hyperparathyroidism 2017    Seizures     Sialadenitis 3/21/2018    Thrombocytopenia 2016    Thrombocytopenia 2016     Past Surgical History:   Procedure Laterality Date     SECTION      x 2    CONIZATION OF CERVIX USING LOOP ELECTROSURGICAL EXCISION PROCEDURE (LEEP) N/A 6/15/2018    Procedure: CONIZATION-CERVICAL-LEEP;  Surgeon: Neelam Marroquin MD;  Location: [facility];  Service: OB/GYN;  Laterality: N/A;    LIVER BIOPSY      LIVER TRANSPLANT  1992    TUBAL LIGATION       Family History   Problem Relation Age of Onset    Hypertension Mother     Hypertension Father     Melanoma Neg Hx     Breast cancer Neg Hx     Colon cancer Neg Hx     Ovarian cancer Neg Hx      Social History   Substance Use Topics    Smoking status: Never Smoker    Smokeless tobacco: Never Used    Alcohol use No     Review of Systems   Constitutional: Negative for activity change, appetite change, chills, fatigue and fever.   HENT: Negative for congestion, rhinorrhea and sore throat.    Eyes: Negative for photophobia and visual disturbance.   Respiratory: Negative for cough, shortness of breath and wheezing.    Cardiovascular: Negative for chest pain.   Gastrointestinal: Negative for abdominal pain, diarrhea, nausea and vomiting.   Genitourinary: Positive for vaginal bleeding. Negative for dysuria, hematuria and urgency.   Musculoskeletal: Negative for back pain, myalgias and neck pain.   Skin: Negative for color change and wound.   Neurological: Negative for weakness and headaches.   Psychiatric/Behavioral: Negative for agitation and confusion.       Physical Exam     Initial Vitals [18  1150]   BP Pulse Resp Temp SpO2   (!) 141/90 90 16 97.6 °F (36.4 °C) 99 %      MAP       --         Physical Exam    Nursing note and vitals reviewed.  Constitutional: She appears well-developed and well-nourished. She is not diaphoretic. She is cooperative.  Non-toxic appearance. She does not have a sickly appearance. She does not appear ill. No distress.   Well-appearing,  female accompanied to the emergency department.  Speaking clearly in full sentences.   HENT:   Head: Normocephalic and atraumatic.   Right Ear: External ear normal.   Left Ear: External ear normal.   Nose: Nose normal.   Mouth/Throat: Oropharynx is clear and moist.   Eyes: Conjunctivae and EOM are normal.   Neck: Normal range of motion. Neck supple.   Cardiovascular: Normal rate, regular rhythm and normal heart sounds.   Pulmonary/Chest: Breath sounds normal. No respiratory distress. She has no wheezes.   Abdominal: Soft. Bowel sounds are normal. She exhibits no distension. There is no tenderness. There is no rebound and no guarding.   Musculoskeletal: Normal range of motion.   Neurological: She is alert and oriented to person, place, and time.   Skin: Skin is warm.   Psychiatric: She has a normal mood and affect. Her behavior is normal. Judgment and thought content normal.         ED Course   Critical Care  Date/Time: 6/19/2018 1:00 PM  Performed by: JUAN JOE  Authorized by: STACY KINSEY   Direct patient critical care time: 15 minutes  Additional history critical care time: 10 minutes  Ordering / reviewing critical care time: 15 minutes  Documentation critical care time: 15 minutes  Consulting other physicians critical care time: 15 minutes  Consult with family critical care time: 0 minutes  Total critical care time (exclusive of procedural time) : 70 minutes  Critical care was necessary to treat or prevent imminent or life-threatening deterioration of the following conditions: circulatory failure.  Critical care was  time spent personally by me on the following activities: development of treatment plan with patient or surrogate, discussions with consultants, examination of patient, obtaining history from patient or surrogate, ordering and performing treatments and interventions, ordering and review of laboratory studies, re-evaluation of patient's condition and review of old charts.        Labs Reviewed   CBC W/ AUTO DIFFERENTIAL - Abnormal; Notable for the following:        Result Value    WBC 1.50 (*)     RBC 2.13 (*)     Hemoglobin 5.6 (*)     Hematocrit 17.6 (*)     MCH 26.3 (*)     MCHC 31.8 (*)     RDW 17.8 (*)     Platelets 54 (*)     Eosinophil% 18.0 (*)     Platelet Estimate Decreased (*)     All other components within normal limits    Narrative:        White blood cell, Hemoglobin, and Hematocrit critical result(s)   called and verbal readback obtained from Bonnie Bernal RN at 1340.,   06/19/2018 13:40   COMPREHENSIVE METABOLIC PANEL - Abnormal; Notable for the following:     Potassium 3.0 (*)     CO2 35 (*)     Creatinine 3.6 (*)     Albumin 2.7 (*)     Alkaline Phosphatase 560 (*)     AST 47 (*)     Anion Gap 7 (*)     eGFR if  19 (*)     eGFR if non  16 (*)     All other components within normal limits   APTT   PROTIME-INR   TACROLIMUS LEVEL   TYPE & SCREEN   PREPARE RBC SOFT          Imaging Results    None          Medical Decision Making:   Initial Assessment:   Urgent evaluation of a 27 y.o. female with a past medical history of liver transplant, ESRD on hemodialysis (Tues, Thurs, Sat), anemia, seizure disorder, presenting to the emergency department complaining of abnormal vaginal bleeding. Patient is afebrile, nontoxic appearing and hemodynamically stable. The physical exam reveals regular rate and rhythm, lungs are clear to auscultation bilaterally.  Abdomen is soft and nontender.  Will plan for labs, discussion with OBGYN.  Clinical Tests:   Lab Tests: Ordered and Reviewed  The  following lab test(s) were unremarkable: CBC and CMP  ED Management:  12:11 PM spoke with Dr. Roblero [facility] who advises they will be around to see the patient.  CBC shows significant leukopenia in addition to a decreased H&H. Previously on 6/15/18, H&H was 7.1/22.5. Today, H&H is 5.6/17.6. Will discuss blood transfusion with patient.   1:28 PM Patient signed blood consent  1:30 PM OBGYN here to see the patient   PT, PTT normal.   2:50 PM Patient will be taken to the OR by GYN. Patient last ate at approximately 12PM. Requesting she be admitted to hospital medicine given her leukopenia and thrombocytopenia.   3:11 PM Spoke with Dr. Baer from hospital medicine. States he will discuss admission with GYN and call me back.  3:22 PM Dr. Baer states he spoke with gyn who will now admit the patient as primary service, and consult Hospital Medicine, Dr. Forbes.   The treatment plan was discussed with the patient who demonstrated understanding and comfort with plan. The patient's history, physical exam, and plan of care was discussed with and agreed upon with my supervising physician.     Other:   I have discussed this case with another health care provider.       <> Summary of the Discussion: Dr. Roblero (OBGYN)  This note was created using M Modal Fluency Direct. There may be typographical errors secondary to dictation.                         Clinical Impression:     1. Anemia, unspecified type    2. Vaginal bleeding    3. Thrombocytopenia    4. Leukopenia, unspecified type    5. Postoperative vaginal bleeding following genitourinary procedure           Disposition:   Disposition: Placed in Observation  Condition: Stable                        Johanna Weber PA-C  06/19/18 1524       Johanna Weber PA-C  06/19/18 1611

## 2018-06-19 NOTE — HOSPITAL COURSE
06/19/2018 - Patient to ED for persistent vaginal bleeding after LEEP procedure. Unsuccessful management of bleeding in ED.  Pancytopenic on admission. 2 units of PRBCs ordered.  Patient to OR surgical management of bleeding s/p LEEP  06/20/2018 - POD # 1 s/p EUA/Suture Ligation of Cervix.  Patient doing well post-operatively.  Walking and voiding.  Has not eaten.  Denies pain, fever or chills. Has only had vaginal spotting overnight.  Appropriate rise in H/H. Surgically stable once tolerating diet.

## 2018-06-19 NOTE — SUBJECTIVE & OBJECTIVE
Obstetric History       T0      L2     SAB0   TAB0   Ectopic0   Multiple0   Live Births2       # Outcome Date GA Lbr Peter/2nd Weight Sex Delivery Anes PTL Lv   2       CS-LTranv   JOANN   1       CS-LTranv   JOANN        Past Medical History:   Diagnosis Date    Anemia in ESRD (end-stage renal disease) 10/12/2015    dialysis tues, thursday, sat; access left arm    Chronic rejection of liver transplant 3/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 ()    Moderate protein-calorie malnutrition 2017    MRSA bacteremia 2017    Pneumonia     Prophylactic immunotherapy 2014    Renovascular hypertension 10/2/2015    Secondary hyperparathyroidism 2017    Seizures     Sialadenitis 3/21/2018    Thrombocytopenia 2016    Thrombocytopenia 2016     Past Surgical History:   Procedure Laterality Date     SECTION      x 2    CONIZATION OF CERVIX USING LOOP ELECTROSURGICAL EXCISION PROCEDURE (LEEP) N/A 6/15/2018    Procedure: CONIZATION-CERVICAL-LEEP;  Surgeon: Neelam Marroquin MD;  [facility];  Service: OB/GYN;  Laterality: N/A;    LIVER BIOPSY      LIVER TRANSPLANT  1992    TUBAL LIGATION         PTA Medications   Medication Sig    acetaminophen-codeine 300-30mg (TYLENOL #3) 300-30 mg Tab Take 1 tablet by mouth every 6 (six) hours as needed.    aspirin 81 MG Chew Take 1 tablet (81 mg total) by mouth once daily.    cinacalcet (SENSIPAR) 30 MG Tab Take 1 tablet (30 mg total) by mouth daily with breakfast.    citalopram (CELEXA) 20 MG tablet  Take 1 tablet (20 mg total) by mouth once daily.    cloNIDine 0.3 mg/24 hr td ptwk (CATAPRES) 0.3 mg/24 hr Place 1 patch onto the skin every 7 days.    famotidine (PEPCID) 40 MG tablet Take 1 tablet (40 mg total) by mouth once daily.    food supplemt, lactose-reduced (ENSURE ACTIVE HIGH PROTEIN) Liqd Take 236 mLs by mouth 2 (two) times daily.    hydrALAZINE (APRESOLINE) 50 MG tablet Take 1 tablet (50 mg total) by mouth every 8 (eight) hours.    labetalol (NORMODYNE) 200 MG tablet Take 2 tablets (400 mg total) by mouth every 8 (eight) hours.    lacosamide (VIMPAT) 50 mg Tab Take by mouth every 12 (twelve) hours.    levETIRAcetam (KEPPRA) 500 MG Tab Take 500 mg by mouth 2 (two) times daily.    loratadine (CLARITIN) 10 mg tablet Take 1 tablet (10 mg total) by mouth once daily.    montelukast (SINGULAIR) 10 mg tablet Take 1 tablet (10 mg total) by mouth every evening.    mycophenolate (CELLCEPT) 250 mg Cap Take 1,000 mg by mouth 2 (two) times daily.    NIFEdipine (PROCARDIA-XL) 60 MG (OSM) 24 hr tablet TAKE ONE(1) TABLET BY MOUTH EVERY DAY    NIFEdipine (PROCARDIA-XL) 90 MG (OSM) 24 hr tablet Take 1 tablet (90 mg total) by mouth once daily. (Patient taking differently: Take 60 mg by mouth once daily. )    ondansetron (ZOFRAN) 4 MG tablet Take 1 tablet (4 mg total) by mouth every 6 (six) hours as needed for Nausea.    predniSONE (DELTASONE) 1 MG tablet Take 1 mg by mouth every other day.    tacrolimus (PROGRAF) 1 MG Cap Take 7 capsules (7 mg total) by mouth every 12 (twelve) hours.    triamcinolone acetonide 0.1% (KENALOG) 0.1 % ointment AAA on arms, legs, and neck bid x 1-2 wks then prn flares only (Patient taking differently: Apply to affected area(s) on arms, legs, and neck twice daily x 1-2 wks then as needed for flares only)       Review of patient's allergies indicates:   Allergen Reactions    Chloral hydrate      Other reaction(s): Hallucinations  Other reaction(s): Hives    Hydrocodone Other  (See Comments)     Mental status changes        Family History     Problem Relation (Age of Onset)    Hypertension Mother, Father        Social History Main Topics    Smoking status: Never Smoker    Smokeless tobacco: Never Used    Alcohol use No    Drug use: No    Sexual activity: No     Review of Systems   Constitutional: Positive for fatigue. Negative for chills and fever.   Eyes: Negative for visual disturbance.   Respiratory: Negative for shortness of breath.    Cardiovascular: Negative for chest pain.   Gastrointestinal: Negative for diarrhea, nausea and vomiting.   Genitourinary: Positive for vaginal bleeding. Negative for dysuria, hematuria and vaginal discharge.   Skin:  Negative for rash.   Neurological: Negative for seizures, syncope and headaches.   Psychiatric/Behavioral: Negative.       Objective:     Vital Signs (Most Recent):  Temp: 97.6 °F (36.4 °C) (18 1150)  Pulse: 86 (18 1456)  Resp: 16 (18 1150)  BP: (!) 177/119 (18 1456)  SpO2: 100 % (18 1456) Vital Signs (24h Range):  Temp:  [97.6 °F (36.4 °C)] 97.6 °F (36.4 °C)  Pulse:  [86-96] 86  Resp:  [16] 16  SpO2:  [92 %-100 %] 100 %  BP: (141-184)/() 177/119     Weight: 50.8 kg (112 lb)  Body mass index is 18.64 kg/m².    Patient's last menstrual period was 2018.    Physical Exam:   Constitutional: She is oriented to person, place, and time. She appears well-developed and well-nourished. No distress.   Patient does not appear to be in acute distress.     HENT:   Head: Normocephalic and atraumatic.    Eyes: Conjunctivae are normal.    Neck: Neck supple.    Cardiovascular: Normal rate, regular rhythm and intact distal pulses.     Pulmonary/Chest: Effort normal. No respiratory distress.        Abdominal: Soft. She exhibits abdominal incision ( incision scar noted). She exhibits no distension. There is no tenderness. There is no rebound and no guarding.     Genitourinary: Vagina normal.        Genitourinary Comments: SSE: Normal external female genitalia, normal urethral meatus, normal vaginal rugae, normal vaginal mucosa, vaginal blood in canal ~ 150 in clots, black eschar present in vaginal vault which was likely covering site of LEEP. Normal physiologic discharge, no adnexal masses palpated on bimanual exam.                  Neurological: She is alert and oriented to person, place, and time.    Skin: Skin is warm and dry. She is not diaphoretic.    Psychiatric: She has a normal mood and affect. Her behavior is normal. Judgment and thought content normal.       Laboratory:  CBC:   Recent Labs  Lab 06/19/18  1220   WBC 1.50*   RBC 2.13*   HGB 5.6*   HCT 17.6*   PLT 54*   MCV 83   MCH 26.3*   MCHC 31.8*     CMP:   Recent Labs  Lab 06/19/18  1220   GLU 97   CALCIUM 8.8   ALBUMIN 2.7*   PROT 7.7      K 3.0*   CO2 35*   CL 97   BUN 15   CREATININE 3.6*   ALKPHOS 560*   ALT 18   AST 47*   BILITOT 0.5     I have personallly reviewed all pertinent lab results from the last 24 hours.

## 2018-06-19 NOTE — OP NOTE
DATE OF PROCEDURE:  06/19/2018    SURGEON:  Neelam Marroquin M.D.    ASSISTANT:  MELVA Jackson.    PREOPERATIVE DIAGNOSIS:  Vaginal bleeding 5 days following a LEEP procedure.    POSTOPERATIVE DIAGNOSES:  Vaginal bleeding 5 days following LEEP procedure with   severely uncontrolled hypertension and anticoagulation from dialysis treatment.    PROCEDURE:  Examined under anesthesia, stay sutures being placed at 3 and 9   o'clock of the cervix and suture of the cervical cone bed anteriorly.    ANESTHESIA:  General.    FINDINGS:  There was a small amount of slight oozing from the anterior edge of   the cervix near 1 o'clock, but no active bleeding and no active pumpers.    TOTAL ESTIMATED BLOOD LOSS:  5 mL during the procedure.    There were no complications noted.    PROCEDURE IN DETAIL:  After appropriate consents have been obtained, the patient   was taken to the Operating Room and placed under general anesthesia.  She was   placed in lithotomy position, properly positioned with Yellofin stirrups and   positioned for a vaginal procedure.  The bladder was emptied of minimal drops of   urine.  She was prepped and draped in the appropriate sterile fashion for   vaginal procedure.  Weighted speculum was put in place.  An Allis clamp was   placed on the anterior lip of the cervix and the cervix was visualized.  The   eschar was noted to be intact with minimal slight ooze from approximately 1   o'clock of the cone bed.  There was a quarter size clot noted at the cervical   os, but no active bleeding.  The decision was made to place stay sutures at 3   and 9 o'clock.  Sutures of 0 Vicryl were placed followed by 3 figure-of-eight   sutures along the anterior rim of the cervical bed.  Upon completion of the   procedure, the area was completely hemostatic.  A Surgicel pack was placed in   the cone bed and tied across the cervix.  The patient was then taken down from   lithotomy position following removal of the weighted  speculum and tenaculum.    She was sent to Recovery in stable condition.  She will be monitored overnight.    She is to receive platelets and 1 unit packed RBCs.  The blood was already   infusing in the OR and she will be managed in conjunction with the medical team   overnight to be discharged in the morning.      /STEVE  dd: 06/19/2018 17:57:04 (CDT)  td: 06/19/2018 18:51:23 (CDT)  Doc ID   #[id number]  Job ID #[id number]    CC:

## 2018-06-19 NOTE — ED NOTES
Chaperoned pelvic exam. After exam, pt provided clean linens. Bed in lowest, locked position, side rails up  x2.

## 2018-06-19 NOTE — ASSESSMENT & PLAN NOTE
- Patient seen by Hematology/Oncology on 6/7/18  - Will consult Hospital Medicine team for assistance managing the patient's co-morbidities.

## 2018-06-19 NOTE — ANESTHESIA POSTPROCEDURE EVALUATION
"Anesthesia Post Evaluation    Patient: Holly Patel    Procedure(s) Performed: Procedure(s) (LRB):  Exam under anesthesia (N/A)  SUTURE REPAIR,CERVIX    Final Anesthesia Type: general  Patient location during evaluation: PACU  Patient participation: Yes- Able to Participate  Level of consciousness: awake and alert  Post-procedure vital signs: reviewed and stable  Pain management: adequate  Airway patency: patent  PONV status at discharge: No PONV  Anesthetic complications: no      Cardiovascular status: hemodynamically stable and blood pressure returned to baseline  Respiratory status: unassisted and spontaneous ventilation  Hydration status: euvolemic  Follow-up not needed.        Visit Vitals  BP (!) 152/99   Pulse 87   Temp 36.3 °C (97.4 °F) (Oral)   Resp 20   Ht 5' 5" (1.651 m)   Wt 50.8 kg (112 lb)   LMP 05/30/2018   SpO2 99%   BMI 18.64 kg/m²       Pain/Bladimir Score: Presence of Pain: non-verbal indicators absent (6/19/2018  5:24 PM)  Bladimir Score: 8 (6/19/2018  5:24 PM)      "

## 2018-06-20 VITALS
RESPIRATION RATE: 18 BRPM | HEART RATE: 93 BPM | DIASTOLIC BLOOD PRESSURE: 92 MMHG | WEIGHT: 112 LBS | SYSTOLIC BLOOD PRESSURE: 145 MMHG | BODY MASS INDEX: 18.66 KG/M2 | OXYGEN SATURATION: 95 % | HEIGHT: 65 IN | TEMPERATURE: 97 F

## 2018-06-20 LAB
ANION GAP SERPL CALC-SCNC: 10 MMOL/L
BASOPHILS # BLD AUTO: 0.01 K/UL
BASOPHILS # BLD AUTO: 0.02 K/UL
BASOPHILS NFR BLD: 0.4 %
BASOPHILS NFR BLD: 0.6 %
BUN SERPL-MCNC: 21 MG/DL
CALCIUM SERPL-MCNC: 7.9 MG/DL
CHLORIDE SERPL-SCNC: 100 MMOL/L
CO2 SERPL-SCNC: 26 MMOL/L
CREAT SERPL-MCNC: 5.1 MG/DL
DIFFERENTIAL METHOD: ABNORMAL
DIFFERENTIAL METHOD: ABNORMAL
EOSINOPHIL # BLD AUTO: 0.3 K/UL
EOSINOPHIL # BLD AUTO: 0.3 K/UL
EOSINOPHIL NFR BLD: 10.9 %
EOSINOPHIL NFR BLD: 9.4 %
ERYTHROCYTE [DISTWIDTH] IN BLOOD BY AUTOMATED COUNT: 16.4 %
ERYTHROCYTE [DISTWIDTH] IN BLOOD BY AUTOMATED COUNT: 16.5 %
EST. GFR  (AFRICAN AMERICAN): 12 ML/MIN/1.73 M^2
EST. GFR  (NON AFRICAN AMERICAN): 11 ML/MIN/1.73 M^2
GLUCOSE SERPL-MCNC: 101 MG/DL
HCT VFR BLD AUTO: 21.8 %
HCT VFR BLD AUTO: 22.1 %
HGB BLD-MCNC: 7.2 G/DL
HGB BLD-MCNC: 7.2 G/DL
LYMPHOCYTES # BLD AUTO: 0.5 K/UL
LYMPHOCYTES # BLD AUTO: 0.6 K/UL
LYMPHOCYTES NFR BLD: 16.5 %
LYMPHOCYTES NFR BLD: 16.8 %
MCH RBC QN AUTO: 27.5 PG
MCH RBC QN AUTO: 27.8 PG
MCHC RBC AUTO-ENTMCNC: 32.6 G/DL
MCHC RBC AUTO-ENTMCNC: 33 G/DL
MCV RBC AUTO: 84 FL
MCV RBC AUTO: 84 FL
MONOCYTES # BLD AUTO: 0.1 K/UL
MONOCYTES # BLD AUTO: 0.2 K/UL
MONOCYTES NFR BLD: 4.2 %
MONOCYTES NFR BLD: 5 %
NEUTROPHILS # BLD AUTO: 1.9 K/UL
NEUTROPHILS # BLD AUTO: 2.3 K/UL
NEUTROPHILS NFR BLD: 67.7 %
NEUTROPHILS NFR BLD: 68.5 %
PLATELET # BLD AUTO: 71 K/UL
PLATELET # BLD AUTO: 73 K/UL
PMV BLD AUTO: 9.1 FL
PMV BLD AUTO: 9.7 FL
POTASSIUM SERPL-SCNC: 4.2 MMOL/L
RBC # BLD AUTO: 2.59 M/UL
RBC # BLD AUTO: 2.62 M/UL
SODIUM SERPL-SCNC: 136 MMOL/L
TACROLIMUS BLD-MCNC: <1.5 NG/ML
WBC # BLD AUTO: 2.85 K/UL
WBC # BLD AUTO: 3.39 K/UL

## 2018-06-20 PROCEDURE — 99233 SBSQ HOSP IP/OBS HIGH 50: CPT | Mod: ,,, | Performed by: HOSPITALIST

## 2018-06-20 PROCEDURE — S0030 INJECTION, METRONIDAZOLE: HCPCS | Performed by: STUDENT IN AN ORGANIZED HEALTH CARE EDUCATION/TRAINING PROGRAM

## 2018-06-20 PROCEDURE — 94761 N-INVAS EAR/PLS OXIMETRY MLT: CPT

## 2018-06-20 PROCEDURE — 63600175 PHARM REV CODE 636 W HCPCS: Performed by: NURSE PRACTITIONER

## 2018-06-20 PROCEDURE — 36415 COLL VENOUS BLD VENIPUNCTURE: CPT

## 2018-06-20 PROCEDURE — 80048 BASIC METABOLIC PNL TOTAL CA: CPT

## 2018-06-20 PROCEDURE — 25000003 PHARM REV CODE 250: Performed by: STUDENT IN AN ORGANIZED HEALTH CARE EDUCATION/TRAINING PROGRAM

## 2018-06-20 PROCEDURE — 85025 COMPLETE CBC W/AUTO DIFF WBC: CPT

## 2018-06-20 PROCEDURE — 36430 TRANSFUSION BLD/BLD COMPNT: CPT

## 2018-06-20 PROCEDURE — 80197 ASSAY OF TACROLIMUS: CPT

## 2018-06-20 PROCEDURE — 27000221 HC OXYGEN, UP TO 24 HOURS

## 2018-06-20 PROCEDURE — 63600175 PHARM REV CODE 636 W HCPCS: Performed by: STUDENT IN AN ORGANIZED HEALTH CARE EDUCATION/TRAINING PROGRAM

## 2018-06-20 RX ORDER — HYDRALAZINE HYDROCHLORIDE 20 MG/ML
10 INJECTION INTRAMUSCULAR; INTRAVENOUS ONCE
Status: COMPLETED | OUTPATIENT
Start: 2018-06-20 | End: 2018-06-20

## 2018-06-20 RX ORDER — PREDNISONE 1 MG/1
1 TABLET ORAL EVERY OTHER DAY
Status: DISCONTINUED | OUTPATIENT
Start: 2018-06-21 | End: 2018-06-20 | Stop reason: HOSPADM

## 2018-06-20 RX ADMIN — MYCOPHENOLATE MOFETIL 1000 MG: 250 CAPSULE ORAL at 09:06

## 2018-06-20 RX ADMIN — ONDANSETRON 8 MG: 8 TABLET, ORALLY DISINTEGRATING ORAL at 06:06

## 2018-06-20 RX ADMIN — LACOSAMIDE 50 MG: 50 TABLET, FILM COATED ORAL at 09:06

## 2018-06-20 RX ADMIN — LABETALOL HYDROCHLORIDE 400 MG: 200 TABLET, FILM COATED ORAL at 05:06

## 2018-06-20 RX ADMIN — HYDRALAZINE HYDROCHLORIDE 5 MG: 20 INJECTION INTRAMUSCULAR; INTRAVENOUS at 01:06

## 2018-06-20 RX ADMIN — HYDRALAZINE HYDROCHLORIDE 10 MG: 20 INJECTION INTRAMUSCULAR; INTRAVENOUS at 02:06

## 2018-06-20 RX ADMIN — CITALOPRAM HYDROBROMIDE 20 MG: 20 TABLET ORAL at 09:06

## 2018-06-20 RX ADMIN — LEVETIRACETAM 500 MG: 500 TABLET ORAL at 09:06

## 2018-06-20 RX ADMIN — ACETAMINOPHEN AND CODEINE PHOSPHATE 1 TABLET: 300; 30 TABLET ORAL at 02:06

## 2018-06-20 RX ADMIN — NIFEDIPINE 60 MG: 30 TABLET, FILM COATED, EXTENDED RELEASE ORAL at 09:06

## 2018-06-20 RX ADMIN — TACROLIMUS 7 MG: 1 CAPSULE ORAL at 09:06

## 2018-06-20 RX ADMIN — LABETALOL HYDROCHLORIDE 400 MG: 200 TABLET, FILM COATED ORAL at 02:06

## 2018-06-20 RX ADMIN — HYDRALAZINE HYDROCHLORIDE 50 MG: 25 TABLET, FILM COATED ORAL at 05:06

## 2018-06-20 RX ADMIN — HYDRALAZINE HYDROCHLORIDE 50 MG: 25 TABLET, FILM COATED ORAL at 02:06

## 2018-06-20 RX ADMIN — METRONIDAZOLE 500 MG: 500 SOLUTION INTRAVENOUS at 09:06

## 2018-06-20 RX ADMIN — DOXYCYCLINE 100 MG: 100 INJECTION, POWDER, LYOPHILIZED, FOR SOLUTION INTRAVENOUS at 05:06

## 2018-06-20 NOTE — ASSESSMENT & PLAN NOTE
- On Keppra 500 BID  - Patient endorses compliance  - Last Seizure of in February of this year  - Managed per Hospital team

## 2018-06-20 NOTE — ASSESSMENT & PLAN NOTE
Surgery per Gynecology  Agree with transfusion 2U PRBCs and platelets  Small amount of oozing noted per GYN from cervix 6/19  No bleeding today

## 2018-06-20 NOTE — CONSULTS
[facility] Hospital Medicine  Consult Note    Patient Name: Holly Patel  MRN: [id number]  Admission Date: 2018  Hospital Length of Stay: 0 days  Attending Physician: No att. providers found   Primary Care Provider: Stan Sosa MD           Patient information was obtained from patient, past medical records and ER records.     Consults  Subjective:     Principal Problem: Vaginal bleeding    Chief Complaint:   Chief Complaint   Patient presents with    Vaginal Bleeding     + bright red vaginal bleeding since LEEP sx of 6/15 pt reports saturation 32 pads yesterday. Denies dizziness or weakness, last dialysis this AM        HPI: This is a 27 year old female with ESRD, liver transplant with chronic immunosuppression and chronic pancytopenia presents with severe acute blood loss anemia, and LEEP 6/15/2018.  Hospital medicine is consulted for medical co-management.    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 ()    Moderate protein-calorie malnutrition 2017    MRSA bacteremia 2017    Pneumonia     Prophylactic immunotherapy 2014    Renovascular hypertension 10/2/2015    Secondary hyperparathyroidism 2017    Seizures     Sialadenitis 3/21/2018    Thrombocytopenia 2016    Thrombocytopenia 2016       Past Surgical History:   Procedure Laterality Date     SECTION       x 2    CONIZATION OF CERVIX USING LOOP ELECTROSURGICAL EXCISION PROCEDURE (LEEP) N/A 6/15/2018    Procedure: CONIZATION-CERVICAL-LEEP;  Surgeon: Neelam Marroquin MD;  Location: [facility];  Service: OB/GYN;  Laterality: N/A;    LIVER BIOPSY      LIVER TRANSPLANT  09/1992    TUBAL LIGATION  2010       Review of patient's allergies indicates:   Allergen Reactions    Chloral hydrate      Other reaction(s): Hallucinations  Other reaction(s): Hives    Hydrocodone Other (See Comments)     Mental status changes       No current facility-administered medications on file prior to encounter.      Current Outpatient Prescriptions on File Prior to Encounter   Medication Sig    acetaminophen-codeine 300-30mg (TYLENOL #3) 300-30 mg Tab Take 1 tablet by mouth every 6 (six) hours as needed.    aspirin 81 MG Chew Take 1 tablet (81 mg total) by mouth once daily.    cinacalcet (SENSIPAR) 30 MG Tab Take 1 tablet (30 mg total) by mouth daily with breakfast.    citalopram (CELEXA) 20 MG tablet Take 1 tablet (20 mg total) by mouth once daily.    cloNIDine 0.3 mg/24 hr td ptwk (CATAPRES) 0.3 mg/24 hr Place 1 patch onto the skin every 7 days.    famotidine (PEPCID) 40 MG tablet Take 1 tablet (40 mg total) by mouth once daily.    food supplemt, lactose-reduced (ENSURE ACTIVE HIGH PROTEIN) Liqd Take 236 mLs by mouth 2 (two) times daily.    hydrALAZINE (APRESOLINE) 50 MG tablet Take 1 tablet (50 mg total) by mouth every 8 (eight) hours.    labetalol (NORMODYNE) 200 MG tablet Take 2 tablets (400 mg total) by mouth every 8 (eight) hours.    lacosamide (VIMPAT) 50 mg Tab Take by mouth every 12 (twelve) hours.    levETIRAcetam (KEPPRA) 500 MG Tab Take 500 mg by mouth 2 (two) times daily.    loratadine (CLARITIN) 10 mg tablet Take 1 tablet (10 mg total) by mouth once daily.    montelukast (SINGULAIR) 10 mg tablet Take 1 tablet (10 mg total) by mouth every evening.    mycophenolate (CELLCEPT) 250 mg Cap Take 1,000 mg by mouth  2 (two) times daily.    NIFEdipine (PROCARDIA-XL) 90 MG (OSM) 24 hr tablet Take 1 tablet (90 mg total) by mouth once daily. (Patient taking differently: Take 60 mg by mouth once daily. )    ondansetron (ZOFRAN) 4 MG tablet Take 1 tablet (4 mg total) by mouth every 6 (six) hours as needed for Nausea.    predniSONE (DELTASONE) 1 MG tablet Take 1 mg by mouth every other day.    tacrolimus (PROGRAF) 1 MG Cap Take 7 capsules (7 mg total) by mouth every 12 (twelve) hours.    triamcinolone acetonide 0.1% (KENALOG) 0.1 % ointment AAA on arms, legs, and neck bid x 1-2 wks then prn flares only (Patient taking differently: Apply to affected area(s) on arms, legs, and neck twice daily x 1-2 wks then as needed for flares only)    [DISCONTINUED] NIFEdipine (PROCARDIA-XL) 60 MG (OSM) 24 hr tablet TAKE ONE(1) TABLET BY MOUTH EVERY DAY    [DISCONTINUED] fluticasone (FLONASE) 50 mcg/actuation nasal spray 2 sprays (100 mcg total) by Each Nare route once daily.     Family History     Problem Relation (Age of Onset)    Hypertension Mother, Father        Social History Main Topics    Smoking status: Never Smoker    Smokeless tobacco: Never Used    Alcohol use No    Drug use: No    Sexual activity: No     Review of Systems   Constitutional: Positive for activity change.   Respiratory: Negative for shortness of breath.    Cardiovascular: Negative for chest pain.   Gastrointestinal: Negative for abdominal pain.   Genitourinary: Positive for vaginal bleeding. Negative for difficulty urinating.   Skin: Negative for rash.   Neurological: Positive for headaches. Negative for dizziness and weakness.   Psychiatric/Behavioral: The patient is not nervous/anxious.      Objective:     Vital Signs (Most Recent):  Temp: 97.7 °F (36.5 °C) (06/19/18 1923)  Pulse: 87 (06/19/18 1923)  Resp: 16 (06/19/18 1923)  BP: (!) 179/117 (06/19/18 1923)  SpO2: 98 % (06/19/18 1923) Vital Signs (24h Range):  Temp:  [97.4 °F (36.3 °C)-97.7 °F (36.5 °C)] 97.7  °F (36.5 °C)  Pulse:  [84-96] 87  Resp:  [16-28] 16  SpO2:  [92 %-100 %] 98 %  BP: (136-184)/() 179/117     Weight: 50.8 kg (112 lb)  Body mass index is 18.64 kg/m².    Physical Exam   Constitutional: She is oriented to person, place, and time. She appears well-developed and well-nourished.   Chronically ill appearing female in NAD cooperative with exam   Eyes: Pupils are equal, round, and reactive to light.   Pale conjunctiva   Neck: Normal range of motion. Neck supple. No thyromegaly present.   Cardiovascular: Normal rate, regular rhythm and intact distal pulses.  Exam reveals no gallop and no friction rub.    Murmur heard.  Pulmonary/Chest: Effort normal and breath sounds normal. No respiratory distress. She has no wheezes.   Abdominal: Soft. Bowel sounds are normal. She exhibits no distension and no mass. There is no tenderness. There is no rebound and no guarding.   Musculoskeletal: Normal range of motion.   Lymphadenopathy:     She has no cervical adenopathy.   Neurological: She is alert and oriented to person, place, and time. She has normal reflexes. No cranial nerve deficit.   Skin: Skin is warm and dry. No rash noted. No erythema. No pallor.   Psychiatric: She has a normal mood and affect. Her behavior is normal. Judgment and thought content normal.       Significant Labs:   CBC:   Recent Labs  Lab 06/19/18  1220   WBC 1.50*   HGB 5.6*   HCT 17.6*   PLT 54*     CMP:   Recent Labs  Lab 06/19/18  1220      K 3.0*   CL 97   CO2 35*   GLU 97   BUN 15   CREATININE 3.6*   CALCIUM 8.8   PROT 7.7   ALBUMIN 2.7*   BILITOT 0.5   ALKPHOS 560*   AST 47*   ALT 18   ANIONGAP 7*   EGFRNONAA 16*           Assessment/Plan:     * Vaginal bleeding    Surgery per Gynecology  Agree with transfusion 2U PRBCs and platelets          Depression    Continue Celexa 20mg daily          Thrombocytopenia    Transfusion intraoperatively  Continue to monitor          Pancytopenia    Patient is followed by Heme/Onc and  etiology is thought to be multifactorial  No further evaluation needed at this time          ESRD (end stage renal disease) on dialysis    Consult Nephrology for continued hemodialysis          Acute blood loss anemia    Transfuse 2U PRBCs  Continue to monitor          Renovascular hypertension    Continue usual home medications          Liver replaced by transplant    Check Prograf levels and continue therapy            VTE Risk Mitigation         Ordered     IP VTE HIGH RISK PATIENT  Once      06/19/18 1914     Place LINDA hose  Until discontinued      06/19/18 1914     Place sequential compression device  Until discontinued      06/19/18 1914              Thank you for your consult. I will follow-up with patient. Please contact us if you have any additional questions.    Eunice Forbes MD  Department of Hospital Medicine   [facility]

## 2018-06-20 NOTE — SUBJECTIVE & OBJECTIVE
Past Medical History:   Diagnosis Date    Anemia in ESRD (end-stage renal disease) 10/12/2015    dialysis es, thursday, sat; access left arm    Chronic rejection of liver transplant 3/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 ()    Moderate protein-calorie malnutrition 2017    MRSA bacteremia 2017    Pneumonia     Prophylactic immunotherapy 2014    Renovascular hypertension 10/2/2015    Secondary hyperparathyroidism 2017    Seizures     Sialadenitis 3/21/2018    Thrombocytopenia 2016    Thrombocytopenia 2016       Past Surgical History:   Procedure Laterality Date     SECTION      x 2    CONIZATION OF CERVIX USING LOOP ELECTROSURGICAL EXCISION PROCEDURE (LEEP) N/A 6/15/2018    Procedure: CONIZATION-CERVICAL-LEEP;  Surgeon: Neelam Marroquin MD;  Location: [facility];  Service: OB/GYN;  Laterality: N/A;    LIVER BIOPSY      LIVER TRANSPLANT  1992    TUBAL LIGATION         Review of patient's allergies indicates:   Allergen Reactions    Chloral hydrate      Other reaction(s): Hallucinations  Other reaction(s): Hives    Hydrocodone Other (See Comments)     Mental status changes       No current facility-administered medications on file prior to encounter.      Current Outpatient Prescriptions on File Prior to Encounter   Medication Sig    acetaminophen-codeine 300-30mg (TYLENOL #3) 300-30 mg Tab Take 1 tablet by mouth every 6 (six) hours as needed.    aspirin 81 MG Chew Take 1 tablet (81 mg total) by mouth once daily.    cinacalcet (SENSIPAR) 30 MG Tab Take 1  tablet (30 mg total) by mouth daily with breakfast.    citalopram (CELEXA) 20 MG tablet Take 1 tablet (20 mg total) by mouth once daily.    cloNIDine 0.3 mg/24 hr td ptwk (CATAPRES) 0.3 mg/24 hr Place 1 patch onto the skin every 7 days.    famotidine (PEPCID) 40 MG tablet Take 1 tablet (40 mg total) by mouth once daily.    food supplemt, lactose-reduced (ENSURE ACTIVE HIGH PROTEIN) Liqd Take 236 mLs by mouth 2 (two) times daily.    hydrALAZINE (APRESOLINE) 50 MG tablet Take 1 tablet (50 mg total) by mouth every 8 (eight) hours.    labetalol (NORMODYNE) 200 MG tablet Take 2 tablets (400 mg total) by mouth every 8 (eight) hours.    lacosamide (VIMPAT) 50 mg Tab Take by mouth every 12 (twelve) hours.    levETIRAcetam (KEPPRA) 500 MG Tab Take 500 mg by mouth 2 (two) times daily.    loratadine (CLARITIN) 10 mg tablet Take 1 tablet (10 mg total) by mouth once daily.    montelukast (SINGULAIR) 10 mg tablet Take 1 tablet (10 mg total) by mouth every evening.    mycophenolate (CELLCEPT) 250 mg Cap Take 1,000 mg by mouth 2 (two) times daily.    NIFEdipine (PROCARDIA-XL) 90 MG (OSM) 24 hr tablet Take 1 tablet (90 mg total) by mouth once daily. (Patient taking differently: Take 60 mg by mouth once daily. )    ondansetron (ZOFRAN) 4 MG tablet Take 1 tablet (4 mg total) by mouth every 6 (six) hours as needed for Nausea.    predniSONE (DELTASONE) 1 MG tablet Take 1 mg by mouth every other day.    tacrolimus (PROGRAF) 1 MG Cap Take 7 capsules (7 mg total) by mouth every 12 (twelve) hours.    triamcinolone acetonide 0.1% (KENALOG) 0.1 % ointment AAA on arms, legs, and neck bid x 1-2 wks then prn flares only (Patient taking differently: Apply to affected area(s) on arms, legs, and neck twice daily x 1-2 wks then as needed for flares only)    [DISCONTINUED] NIFEdipine (PROCARDIA-XL) 60 MG (OSM) 24 hr tablet TAKE ONE(1) TABLET BY MOUTH EVERY DAY    [DISCONTINUED] fluticasone (FLONASE) 50 mcg/actuation nasal spray 2  sprays (100 mcg total) by Each Nare route once daily.     Family History     Problem Relation (Age of Onset)    Hypertension Mother, Father        Social History Main Topics    Smoking status: Never Smoker    Smokeless tobacco: Never Used    Alcohol use No    Drug use: No    Sexual activity: No     Review of Systems   Constitutional: Positive for activity change.   Respiratory: Negative for shortness of breath.    Cardiovascular: Negative for chest pain.   Gastrointestinal: Negative for abdominal pain.   Genitourinary: Positive for vaginal bleeding. Negative for difficulty urinating.   Skin: Negative for rash.   Neurological: Positive for headaches. Negative for dizziness and weakness.   Psychiatric/Behavioral: The patient is not nervous/anxious.      Objective:     Vital Signs (Most Recent):  Temp: 97.7 °F (36.5 °C) (06/19/18 1923)  Pulse: 87 (06/19/18 1923)  Resp: 16 (06/19/18 1923)  BP: (!) 179/117 (06/19/18 1923)  SpO2: 98 % (06/19/18 1923) Vital Signs (24h Range):  Temp:  [97.4 °F (36.3 °C)-97.7 °F (36.5 °C)] 97.7 °F (36.5 °C)  Pulse:  [84-96] 87  Resp:  [16-28] 16  SpO2:  [92 %-100 %] 98 %  BP: (136-184)/() 179/117     Weight: 50.8 kg (112 lb)  Body mass index is 18.64 kg/m².    Physical Exam   Constitutional: She is oriented to person, place, and time. She appears well-developed and well-nourished.   Chronically ill appearing female in NAD cooperative with exam   Eyes: Pupils are equal, round, and reactive to light.   Pale conjunctiva   Neck: Normal range of motion. Neck supple. No thyromegaly present.   Cardiovascular: Normal rate, regular rhythm and intact distal pulses.  Exam reveals no gallop and no friction rub.    Murmur heard.  Pulmonary/Chest: Effort normal and breath sounds normal. No respiratory distress. She has no wheezes.   Abdominal: Soft. Bowel sounds are normal. She exhibits no distension and no mass. There is no tenderness. There is no rebound and no guarding.   Musculoskeletal:  Normal range of motion.   Lymphadenopathy:     She has no cervical adenopathy.   Neurological: She is alert and oriented to person, place, and time. She has normal reflexes. No cranial nerve deficit.   Skin: Skin is warm and dry. No rash noted. No erythema. No pallor.   Psychiatric: She has a normal mood and affect. Her behavior is normal. Judgment and thought content normal.       Significant Labs:   CBC:   Recent Labs  Lab 06/19/18  1220   WBC 1.50*   HGB 5.6*   HCT 17.6*   PLT 54*     CMP:   Recent Labs  Lab 06/19/18  1220      K 3.0*   CL 97   CO2 35*   GLU 97   BUN 15   CREATININE 3.6*   CALCIUM 8.8   PROT 7.7   ALBUMIN 2.7*   BILITOT 0.5   ALKPHOS 560*   AST 47*   ALT 18   ANIONGAP 7*   EGFRNONAA 16*

## 2018-06-20 NOTE — PROGRESS NOTES
"[facility]  Obstetrics & Gynecology  Progress Note    Patient Name: Holly Patel  MRN: [id number]  Admission Date: 2018  Primary Care Provider: Stan Sosa MD  Principal Problem: Vaginal bleeding    Subjective:     HPI:  Ms. Patel is a 27 year old  with numerous co-morbidities including ESRD - Hemodialysis dependent, s/p liver transplant (, on chronic immune suppression), Anemia and and thrombocytopenia, seizure disorder and depression.  The patient underwent an uncomplicated LEEP procedure on 6/15/2018. She was subsequently discharged without complications.  She stated that she had light vaginal bleeding the night of the procedure but she noticed significant the day following the surgery.  Since that time she has experienced persistent bleeding, saturating more than a pad per hour on multiple occasions over the past few days.  She then contacted her primary OBGYN today who suggested that she come to the ED for evaluation.      In the ED the patient was found to be in no acute distress but with noted significant vaginal bleeding.  Her CBC was grossly abnormal with a WBC of 1.5 and an H/H of 5.6/17.6 and platelets of 54.  On pelvic exam the patient had noted persistent bleeding from the cervix which was not controlled by pressure or Monsels in the ED.  Two units of PRBCs were ordered in the ED and the decision was made to do an exam under anesthesia for possible surgical management of the persistent vaginal bleeding.       Interval History: Patient doing well. Pain controlled. Minimal vaginal bleeding, "spotting." Voiding spontaneously.  Ambulating.  Has not eaten. Denies signs of symptoms of anemia.     Scheduled Meds:   citalopram  20 mg Oral Daily    doxycycline (VIBRAMYCIN) IVPB  100 mg Intravenous Q12H    hydrALAZINE  50 mg Oral Q8H    labetalol  400 mg Oral Q8H    lacosamide  50 mg Oral Q12H    levETIRAcetam  500 mg Oral BID    metronidazole  500 mg " Intravenous BID    mycophenolate  1,000 mg Oral BID    NIFEdipine  60 mg Oral Daily    predniSONE  1 mg Oral Every other day    tacrolimus  7 mg Oral BID     Continuous Infusions:  PRN Meds:sodium chloride, sodium chloride, acetaminophen, acetaminophen-codeine 300-30mg, hydrALAZINE, labetalol, ondansetron, promethazine (PHENERGAN) IVPB, sodium chloride 0.9%, sodium chloride 0.9%    Review of patient's allergies indicates:   Allergen Reactions    Chloral hydrate      Other reaction(s): Hallucinations  Other reaction(s): Hives    Hydrocodone Other (See Comments)     Mental status changes       Objective:     Vital Signs (Most Recent):  Temp: 97.8 °F (36.6 °C) (06/20/18 0413)  Pulse: 104 (06/20/18 0413)  Resp: 19 (06/20/18 0413)  BP: (!) 186/115 (Ludmila NP notified. No new orders.) (06/20/18 0413)  SpO2: 96 % (06/20/18 0413) Vital Signs (24h Range):  Temp:  [97.4 °F (36.3 °C)-98 °F (36.7 °C)] 97.8 °F (36.6 °C)  Pulse:  [] 104  Resp:  [16-28] 19  SpO2:  [92 %-100 %] 96 %  BP: (136-192)/() 186/115     Weight: 50.8 kg (112 lb)  Body mass index is 18.64 kg/m².  Patient's last menstrual period was 05/30/2018 (exact date).    I&O (Last 24H):    Intake/Output Summary (Last 24 hours) at 06/20/18 0627  Last data filed at 06/20/18 0528   Gross per 24 hour   Intake             2280 ml   Output                0 ml   Net             2280 ml       Physical Exam:   Constitutional: She is oriented to person, place, and time. She appears well-developed and well-nourished. No distress.    HENT:   Head: Normocephalic and atraumatic.    Eyes: Conjunctivae are normal.    Neck: Neck supple.    Cardiovascular: Normal rate.     Pulmonary/Chest: Effort normal. No respiratory distress.        Abdominal: She exhibits distension. There is no rebound and no guarding.     Genitourinary: Vagina normal.               Neurological: She is alert and oriented to person, place, and time.    Skin: Skin is warm and dry. She is not  diaphoretic.    Psychiatric: She has a normal mood and affect. Her behavior is normal. Judgment and thought content normal.       Laboratory:  CBC:   Recent Labs  Lab 06/20/18  0049   WBC 2.85*   RBC 2.59*   HGB 7.2*   HCT 21.8*   PLT 71*   MCV 84   MCH 27.8   MCHC 33.0     CMP:   Recent Labs  Lab 06/19/18  1220   GLU 97   CALCIUM 8.8   ALBUMIN 2.7*   PROT 7.7      K 3.0*   CO2 35*   CL 97   BUN 15   CREATININE 3.6*   ALKPHOS 560*   ALT 18   AST 47*   BILITOT 0.5     I have personallly reviewed all pertinent lab results from the last 24 hours.      Assessment/Plan:     * Vaginal bleeding    - Secondary to LEEP Procedure 6/15/2018  - Thrombocytopenia likely contributory factor  - Unable to manage vaginal bleeding in ED with Monsels and pressure  - POD # 1 s/p suture ligation of Cervix   - Only vaginal spotting overnight. Bleeding controlled   - Doxy 100 BID and Flagyl 500 BID Continued  - Patient stable from surgical standpoint once tolerating a diet         Depression    - Celexa 20 mg Daily        Anemia    - CBC on Admit 5.6/17.6 > S/P 2 Units > 7.2/21.8  - VSS stable on Admit  - Consents signed  - Patient complains of fatigues which is chronic in nature but otherwise denies signs or symptoms of anemia        Thrombocytopenia    - Platelets 50 on admit > S/P Platelets > 71        Pancytopenia    - Patient seen by Hematology/Oncology on 6/7/18  - Will consult Hospital Medicine team for assistance managing the patient's co-morbidities.          Seizures    - On Keppra 500 BID  - Patient endorses compliance  - Last Seizure of in February of this year  - Managed per Hospital team        ESRD (end stage renal disease) on dialysis    - On Hemodialysis (T, TH, SAT)  - Last HD - 6/19/2018  - Cr on Admit 3.6  - Nephrology Consulted        Renovascular hypertension    - On Procardia 60 XL  - BP: (136-192)/() 186/115  - Managed per Hospital team          Liver replaced by transplant    - S/P Transplant in 1992  -  On Chronic Immune Suppression  - On Prednisone, Tacrolimus and Mycophenolate            Benedict Roblero MD  Obstetrics & Gynecology  [facility]    Doing well, no questions,no problems.  I have reviewed the resident's note, evaluated the patient and agree with the diagnosis and management plan

## 2018-06-20 NOTE — PROGRESS NOTES
[facility] Hospital Medicine  Progress Note    Patient Name: Holly Patel  MRN: [id number]  Patient Class: IP- Inpatient   Admission Date: 6/19/2018  Length of Stay: 1 days  Attending Physician: Casie Salmeron DO  Primary Care Provider: Stan Sosa MD        Subjective:     Principal Problem:Vaginal bleeding    HPI:  This is a 27 year old female with ESRD, liver transplant with chronic immunosuppression and chronic pancytopenia presents with severe acute blood loss anemia, and LEEP 6/15/2018.  Hospital medicine is consulted for medical co-management.    Hospital Course:  No notes on file    Interval History: The patient had no complaints this am.  The patient is s/p 2U PRBCs and platelets with no signs of further bleeding.      Review of Systems   All other systems reviewed and are negative.    Objective:     Vital Signs (Most Recent):  Temp: 97.9 °F (36.6 °C) (06/20/18 1238)  Pulse: 96 (06/20/18 1238)  Resp: 18 (06/20/18 1238)  BP: (!) 178/82 (06/20/18 1435)  SpO2: (!) 94 % (06/20/18 1238) Vital Signs (24h Range):  Temp:  [97.1 °F (36.2 °C)-98 °F (36.7 °C)] 97.9 °F (36.6 °C)  Pulse:  [] 96  Resp:  [16-28] 18  SpO2:  [94 %-100 %] 94 %  BP: (136-205)/() 178/82     Weight: 50.8 kg (112 lb)  Body mass index is 18.64 kg/m².    Intake/Output Summary (Last 24 hours) at 06/20/18 1551  Last data filed at 06/20/18 0528   Gross per 24 hour   Intake             2280 ml   Output                0 ml   Net             2280 ml      Physical Exam   Constitutional: She is oriented to person, place, and time. She appears well-developed and well-nourished.   Chronically ill appearing female in NAD cooperative with exam   Eyes: Pupils are equal, round, and reactive to light.   Pale conjunctiva   Neck: Normal range of motion. Neck supple. No thyromegaly present.   Cardiovascular: Normal rate, regular rhythm and intact distal pulses.  Exam reveals no gallop and no friction rub.    Murmur  heard.  Pulmonary/Chest: Effort normal and breath sounds normal. No respiratory distress. She has no wheezes.   Abdominal: Soft. Bowel sounds are normal. She exhibits no distension and no mass. There is no tenderness. There is no rebound and no guarding.   Musculoskeletal: Normal range of motion.   Lymphadenopathy:     She has no cervical adenopathy.   Neurological: She is alert and oriented to person, place, and time. She has normal reflexes. No cranial nerve deficit.   Skin: Skin is warm and dry. No rash noted. No erythema. No pallor.   Psychiatric: She has a normal mood and affect. Her behavior is normal. Judgment and thought content normal.       Significant Labs:   CBC:   Recent Labs  Lab 06/19/18  1220 06/20/18  0049 06/20/18  0457   WBC 1.50* 2.85* 3.39*   HGB 5.6* 7.2* 7.2*   HCT 17.6* 21.8* 22.1*   PLT 54* 71* 73*     CMP:   Recent Labs  Lab 06/19/18  1220 06/20/18  1014    136   K 3.0* 4.2   CL 97 100   CO2 35* 26   GLU 97 101   BUN 15 21*   CREATININE 3.6* 5.1*   CALCIUM 8.8 7.9*   PROT 7.7  --    ALBUMIN 2.7*  --    BILITOT 0.5  --    ALKPHOS 560*  --    AST 47*  --    ALT 18  --    ANIONGAP 7* 10   EGFRNONAA 16* 11*           Assessment/Plan:      * Vaginal bleeding    Surgery per Gynecology  Agree with transfusion 2U PRBCs and platelets  Small amount of oozing noted per GYN from cervix 6/19  No bleeding today            Depression    Continue Celexa 20mg daily          Thrombocytopenia    Transfusion intraoperatively  Platelet count is improved but remains low  No active signs of bleeding              Pancytopenia    Patient is followed by Heme/Onc and etiology is thought to be multifactorial  No further evaluation needed at this time          ESRD (end stage renal disease) on dialysis    Consult Nephrology for continued hemodialysis          Acute blood loss anemia    Transfused 2U PRBCs  No apparent signs of active bleeding          Renovascular hypertension    Continue usual home  medications          Liver replaced by transplant    Prograf levels are low, but will continue usual dosing            VTE Risk Mitigation         Ordered     IP VTE HIGH RISK PATIENT  Once      06/19/18 1914     Place LINDA hose  Until discontinued      06/19/18 1914     Place sequential compression device  Until discontinued      06/19/18 1914              Eunice Forbes MD  Department of Hospital Medicine   [facility]

## 2018-06-20 NOTE — DISCHARGE INSTRUCTIONS
Incision Care  Remember: Follow-up visits allow your healthcare provider to make sure your incision is healing well. Be sure to keep your appointments.     Stitches (sutures), surgical staples, special strips of surgical tape, or surgical skin glue may be used to close incisions. They also help stop bleeding and speed healing. To help your incision heal, follow the tips on this handout.  Home care  Tips for home care include the following:  · Always wash your hands before touching your incision.  · Keep your incision clean and dry.  · Avoid doing things that could cause dirt or sweat to get on your incision.  · Dont pick at scabs. They help protect the wound.  · Keep your incision out of water.  · Take a sponge bath to avoid getting your incision wet, unless your healthcare provider tells you otherwise.  · Ask your provider when can you take a shower or bathe.  · Ask your provider about the best way to keep your incision dry when bathing or showering.  · Pat stitches dry if they get wet. Dont rub.  · Leave the bandage (dressing) in place until you are told to remove it or change it. Change it only as directed, using clean hands.  · After the first 12 hours, change your dressing every 24 hours, or as directed by your healthcare provider.  · Change your dressing if it gets wet or soiled.  Care for specific closures  Follow these guidelines unless your healthcare provider tells you otherwise:  · Stitches or staples. Once you no longer need to keep these dry, clean the wound daily. First remove the bandage using clean hands. Then wash the area gently with soap and warm water. Use a wet cotton swab to loosen and remove any blood or crust that forms. After cleaning, put a thin layer of antibiotic ointment on. Then put on a new bandage.  · Skin glue. Dont put liquid, ointment, or cream on your wound while the glue is in place. Avoid activities that cause heavy sweating. Protect the wound from sunlight. Do not scratch,  rub, or pick at the glue. Do not put tape directly over the glue. The glue should peel off within 5 to 10 days.  · Surgical tape. Keep the area dry. If it gets wet, blot the area dry with a clean towel. Surgical tape usually falls off within 7 to 10 days. If it has not fallen off after 10 days, contact your healthcare provider before taking it off yourself. If you are told to remove the tape, put mineral oil or petroleum jelly on a cotton ball. Gently rub the tape until it is removed.  Changing your dressing  Leave the dressing (bandage) in place until you are told to remove it or change it. Follow the instructions below unless told otherwise by your healthcare provider:  · Always wash your hands before changing your dressing.  · After the first 48 hours, the incision wound usually will have closed. At this point, leave the incision uncovered and open to the air. If the incision has not closed keep it covered.  · Cover your incision only if your clothing is rubbing it or causing irritation.  · Change your dressing if it gets wet or soiled.  Follow-up care  Follow up with your healthcare provider to ask how long sutures or staples should be left in place. Be sure to return for stitch or staple removal as directed. If dissolving stitches were used in your mouth, these will not need to be removed. They should fall out or dissolve on their own.  If tape closures were used, remove them yourself when your provider recommends if they have not fallen off on their own. If skin glue was used, the glue will wear off by itself.  When to seek medical care  Call your healthcare provider if you have any of the following:  · More pain, redness, swelling, bleeding, or foul-smelling discharge around the incision area  · Fever of 100.4°F (38ºC) or higher, or as directed by your healthcare provider  · Shaking chills  · Vomiting or nausea that doesn't go away  · Numbness, coldness, or tingling around the incision area, or changes in  skin color  · Opening of the sutures or wound  · Stitches or staples come apart or fall out or surgical tape falls off before 7 days or as directed by your healthcare provider   Date Last Reviewed: 12/1/2016  © 6087-6519 [employer]. [address] [place] [address], [place] [address]. All rights reserved. This information is not intended as a substitute for professional medical care. Always follow your healthcare professional's instructions.

## 2018-06-20 NOTE — PLAN OF CARE
LMSW met patient at the bedside to complete discharge planning assessment. Patients mother is at the bedside.     Patient is alert and oriented with no communication barriers.     Prior to admission patient was independent with all ADLs. Patients mother confirms that patient is able to complete ADLS without assistance but family watches her and assist if she ever needs help. Patient denies the use of HH. LMSW talked to the patient about H for medication management. Patient and mother said she had that already, we dont need that. Patient denies the use of any DME.           Patients PCP is correct on the face sheet. Patient uses [facility] in [place].          Patient denies a history of mental illness or substance use.      Patient will be transport home by her family. Patients dialysis is TTS at the [facility] on [facility].     No CM needs identified at this time discharge. LMSW will continue to follow.        06/20/18 1030   Discharge Assessment   Assessment Type Discharge Planning Assessment   Confirmed/corrected address and phone number on facesheet? Yes   Assessment information obtained from? Patient;Caregiver   Communicated expected length of stay with patient/caregiver no   Prior to hospitilization cognitive status: Alert/Oriented   Prior to hospitalization functional status: Independent   Current cognitive status: Alert/Oriented   Current Functional Status: Independent   Lives With parent(s)   Able to Return to Prior Arrangements yes   Is patient able to care for self after discharge? Yes   Patient's perception of discharge disposition home or selfcare   Readmission Within The Last 30 Days previous discharge plan unsuccessful   If yes, most recent facility name: [facility]    Patient currently being followed by outpatient case management? No   Patient currently receives home health services? No   Patient currently receives any other outside agency services? No   Is it the patient/care giver preference to resume  care with the current outside agency? No   Equipment Currently Used at Home none   Do you have any problems affording any of your prescribed medications? No   Is the patient taking medications as prescribed? yes   Does the patient have transportation home? Yes   Transportation Available family or friend will provide   Dialysis Name and Scheduled days [facility]   Does the patient receive services at the [facility]? No   Discharge Plan A Home;Home with family   Patient/Family In Agreement With Plan yes   Arrived From home or self-care   How many people do you have in your home that can help with your care after discharge? >4   Patient currently receives private duty nursing? No   Do you have any financial concerns preventing you from receiving the healthcare you need? No   On Dialysis? Yes   Does the patient receive outpatient dialysis? Yes   Readmission Questionnaire   At the time of your discharge, did someone talk to you about what your health problems were? Yes   At the time of discharge, did someone talk to you about what to watch out for regarding worsening of your health problem? Yes   At the time of discharge, did someone talk to you about what to do if you experienced worsening of your health problem? Yes   At the time of discharge, did someone talk to you about which medication to take when you left the hospital and which ones to stop taking? Yes   At the time of discharge, did someone talk to you about when and where to follow up with a doctor after you left the hospital? Yes   How often do you need to have someone help you when you read instructions, pamphlets, or other written material from your doctor or pharmacy? Never   Do you have problems taking your medications as prescribed? No   Do you have any problems affording any of  your prescribed medications? No   Do you have problems obtaining/receiving your medications? No   Does the patient have transportation to healthcare appointments? No    Living Arrangements house   Does the patient have family/friends to help with healtcare needs after discharge? yes   Does your caregiver provide all the help you need? Yes   Are you currently feeling confused? No   Are you currently having problems thinking? No   Are you currently having memory problems? No   Have you felt down, depressed, or hopeless? Unable to Assess   Have you felt little interest or pleasure in doing things? Unable to assess

## 2018-06-20 NOTE — SUBJECTIVE & OBJECTIVE
Interval History: The patient had no complaints this am.  The patient is s/p 2U PRBCs and platelets with no signs of further bleeding.      Review of Systems   All other systems reviewed and are negative.    Objective:     Vital Signs (Most Recent):  Temp: 97.9 °F (36.6 °C) (06/20/18 1238)  Pulse: 96 (06/20/18 1238)  Resp: 18 (06/20/18 1238)  BP: (!) 178/82 (06/20/18 1435)  SpO2: (!) 94 % (06/20/18 1238) Vital Signs (24h Range):  Temp:  [97.1 °F (36.2 °C)-98 °F (36.7 °C)] 97.9 °F (36.6 °C)  Pulse:  [] 96  Resp:  [16-28] 18  SpO2:  [94 %-100 %] 94 %  BP: (136-205)/() 178/82     Weight: 50.8 kg (112 lb)  Body mass index is 18.64 kg/m².    Intake/Output Summary (Last 24 hours) at 06/20/18 1551  Last data filed at 06/20/18 0528   Gross per 24 hour   Intake             2280 ml   Output                0 ml   Net             2280 ml      Physical Exam   Constitutional: She is oriented to person, place, and time. She appears well-developed and well-nourished.   Chronically ill appearing female in NAD cooperative with exam   Eyes: Pupils are equal, round, and reactive to light.   Pale conjunctiva   Neck: Normal range of motion. Neck supple. No thyromegaly present.   Cardiovascular: Normal rate, regular rhythm and intact distal pulses.  Exam reveals no gallop and no friction rub.    Murmur heard.  Pulmonary/Chest: Effort normal and breath sounds normal. No respiratory distress. She has no wheezes.   Abdominal: Soft. Bowel sounds are normal. She exhibits no distension and no mass. There is no tenderness. There is no rebound and no guarding.   Musculoskeletal: Normal range of motion.   Lymphadenopathy:     She has no cervical adenopathy.   Neurological: She is alert and oriented to person, place, and time. She has normal reflexes. No cranial nerve deficit.   Skin: Skin is warm and dry. No rash noted. No erythema. No pallor.   Psychiatric: She has a normal mood and affect. Her behavior is normal. Judgment and thought  content normal.       Significant Labs:   CBC:   Recent Labs  Lab 06/19/18  1220 06/20/18  0049 06/20/18  0457   WBC 1.50* 2.85* 3.39*   HGB 5.6* 7.2* 7.2*   HCT 17.6* 21.8* 22.1*   PLT 54* 71* 73*     CMP:   Recent Labs  Lab 06/19/18  1220 06/20/18  1014    136   K 3.0* 4.2   CL 97 100   CO2 35* 26   GLU 97 101   BUN 15 21*   CREATININE 3.6* 5.1*   CALCIUM 8.8 7.9*   PROT 7.7  --    ALBUMIN 2.7*  --    BILITOT 0.5  --    ALKPHOS 560*  --    AST 47*  --    ALT 18  --    ANIONGAP 7* 10   EGFRNONAA 16* 11*

## 2018-06-20 NOTE — ASSESSMENT & PLAN NOTE
- Secondary to LEEP Procedure 6/15/2018  - Thrombocytopenia likely contributory factor  - Unable to manage vaginal bleeding in ED with Monsels and pressure  - POD # 1 s/p suture ligation of Cervix   - Only vaginal spotting overnight. Bleeding controlled   - Doxy 100 BID and Flagyl 500 BID Continued  - Patient stable from surgical standpoint once tolerating a diet

## 2018-06-20 NOTE — PLAN OF CARE
06/20/18 1543   BORJAS Message   Medicare Outpatient and Observation Notification regarding financial responsibility Given to patient/caregiver;Explained to patient/caregiver;Signed/date by patient/caregiver   Date BORJAS was signed 06/20/18   Time BORJAS was signed 1000

## 2018-06-20 NOTE — ASSESSMENT & PLAN NOTE
- CBC on Admit 5.6/17.6 > S/P 2 Units > 7.2/21.8  - VSS stable on Admit  - Consents signed  - Patient complains of fatigues which is chronic in nature but otherwise denies signs or symptoms of anemia

## 2018-06-20 NOTE — ASSESSMENT & PLAN NOTE
Patient is followed by Heme/Onc and etiology is thought to be multifactorial  No further evaluation needed at this time

## 2018-06-20 NOTE — HPI
This is a 27 year old female with ESRD, liver transplant with chronic immunosuppression and chronic pancytopenia presents with severe acute blood loss anemia, and LEEP 6/15/2018.  Hospital medicine is consulted for medical co-management.

## 2018-06-20 NOTE — PLAN OF CARE
Patient discharged home with self care.     Patients mother will transport her home.     No CM needs for discharge.        06/20/18 1555   Final Note   Assessment Type Final Discharge Note   Discharge Disposition Home   What phone number can be called within the next 1-3 days to see how you are doing after discharge? [phone]

## 2018-06-20 NOTE — SUBJECTIVE & OBJECTIVE
"Interval History: Patient doing well. Pain controlled. Minimal vaginal bleeding, "spotting." Voiding spontaneously.  Ambulating.  Has not eaten. Denies signs of symptoms of anemia.     Scheduled Meds:   citalopram  20 mg Oral Daily    doxycycline (VIBRAMYCIN) IVPB  100 mg Intravenous Q12H    hydrALAZINE  50 mg Oral Q8H    labetalol  400 mg Oral Q8H    lacosamide  50 mg Oral Q12H    levETIRAcetam  500 mg Oral BID    metronidazole  500 mg Intravenous BID    mycophenolate  1,000 mg Oral BID    NIFEdipine  60 mg Oral Daily    predniSONE  1 mg Oral Every other day    tacrolimus  7 mg Oral BID     Continuous Infusions:  PRN Meds:sodium chloride, sodium chloride, acetaminophen, acetaminophen-codeine 300-30mg, hydrALAZINE, labetalol, ondansetron, promethazine (PHENERGAN) IVPB, sodium chloride 0.9%, sodium chloride 0.9%    Review of patient's allergies indicates:   Allergen Reactions    Chloral hydrate      Other reaction(s): Hallucinations  Other reaction(s): Hives    Hydrocodone Other (See Comments)     Mental status changes       Objective:     Vital Signs (Most Recent):  Temp: 97.8 °F (36.6 °C) (06/20/18 0413)  Pulse: 104 (06/20/18 0413)  Resp: 19 (06/20/18 0413)  BP: (!) 186/115 (Ludmila FAUSTIN notified. No new orders.) (06/20/18 0413)  SpO2: 96 % (06/20/18 0413) Vital Signs (24h Range):  Temp:  [97.4 °F (36.3 °C)-98 °F (36.7 °C)] 97.8 °F (36.6 °C)  Pulse:  [] 104  Resp:  [16-28] 19  SpO2:  [92 %-100 %] 96 %  BP: (136-192)/() 186/115     Weight: 50.8 kg (112 lb)  Body mass index is 18.64 kg/m².  Patient's last menstrual period was 05/30/2018 (exact date).    I&O (Last 24H):    Intake/Output Summary (Last 24 hours) at 06/20/18 0627  Last data filed at 06/20/18 0528   Gross per 24 hour   Intake             2280 ml   Output                0 ml   Net             2280 ml       Physical Exam:   Constitutional: She is oriented to person, place, and time. She appears well-developed and well-nourished. No " distress.    HENT:   Head: Normocephalic and atraumatic.    Eyes: Conjunctivae are normal.    Neck: Neck supple.    Cardiovascular: Normal rate.     Pulmonary/Chest: Effort normal. No respiratory distress.        Abdominal: She exhibits distension. There is no rebound and no guarding.     Genitourinary: Vagina normal.               Neurological: She is alert and oriented to person, place, and time.    Skin: Skin is warm and dry. She is not diaphoretic.    Psychiatric: She has a normal mood and affect. Her behavior is normal. Judgment and thought content normal.       Laboratory:  CBC:   Recent Labs  Lab 06/20/18  0049   WBC 2.85*   RBC 2.59*   HGB 7.2*   HCT 21.8*   PLT 71*   MCV 84   MCH 27.8   MCHC 33.0     CMP:   Recent Labs  Lab 06/19/18  1220   GLU 97   CALCIUM 8.8   ALBUMIN 2.7*   PROT 7.7      K 3.0*   CO2 35*   CL 97   BUN 15   CREATININE 3.6*   ALKPHOS 560*   ALT 18   AST 47*   BILITOT 0.5     I have personallly reviewed all pertinent lab results from the last 24 hours.

## 2018-06-20 NOTE — ASSESSMENT & PLAN NOTE
Transfusion intraoperatively  Platelet count is improved but remains low  No active signs of bleeding

## 2018-06-21 LAB — TACROLIMUS BLD-MCNC: 7 NG/ML

## 2018-06-21 NOTE — PROGRESS NOTES
[facility]  Obstetrics & Gynecology  Progress Note    Patient Name: Holly Patel  MRN: [id number]  Admission Date: 2018  Primary Care Provider: Stan Sosa MD  Principal Problem: Vaginal bleeding    Subjective:     HPI:  Ms. Patel is a 27 year old  with numerous co-morbidities including ESRD - Hemodialysis dependent, s/p liver transplant (, on chronic immune suppression), Anemia and and thrombocytopenia, seizure disorder and depression.  The patient underwent an uncomplicated LEEP procedure on 6/15/2018. She was subsequently discharged without complications.  She stated that she had light vaginal bleeding the night of the procedure but she noticed significant the day following the surgery.  Since that time she has experienced persistent bleeding, saturating more than a pad per hour on multiple occasions over the past few days.  She then contacted her primary OBGYN today who suggested that she come to the ED for evaluation.      In the ED the patient was found to be in no acute distress but with noted significant vaginal bleeding.  Her CBC was grossly abnormal with a WBC of 1.5 and an H/H of 5.6/17.6 and platelets of 54.  On pelvic exam the patient had noted persistent bleeding from the cervix which was not controlled by pressure or Monsels in the ED.  Two units of PRBCs were ordered in the ED and the decision was made to do an exam under anesthesia for possible surgical management of the persistent vaginal bleeding.       No new subjective & objective note has been filed under this hospital service since the last note was generated.    Hospital Course:  2018 - Patient to ED for persistent vaginal bleeding after LEEP procedure. Unsuccessful management of bleeding in ED.  Pancytopenic on admission. 2 units of PRBCs ordered.  Patient to OR surgical management of bleeding s/p LEEP  2018 - POD # 1 s/p EUA/Suture Ligation of Cervix.  Patient doing well  post-operatively.  Walking and voiding.  Has not eaten.  Denies pain, fever or chills. Has only had vaginal spotting overnight.  Appropriate rise in H/H. Surgically stable once tolerating diet.         Assessment/Plan:     * Vaginal bleeding    - Secondary to LEEP Procedure 6/15/2018  - Thrombocytopenia likely contributory factor  - Unable to manage vaginal bleeding in ED with Monsels and pressure  - POD # 1 s/p suture ligation of Cervix   - Only vaginal spotting overnight. Bleeding controlled   - Doxy 100 BID and Flagyl 500 BID Continued  - Patient stable from surgical standpoint once tolerating a diet         Depression    - Celexa 20 mg Daily        Anemia    - CBC on Admit 5.6/17.6 > S/P 2 Units > 7.2/21.8  - VSS stable on Admit  - Consents signed  - Patient complains of fatigues which is chronic in nature but otherwise denies signs or symptoms of anemia        Thrombocytopenia    - Platelets 50 on admit > S/P Platelets > 71        Pancytopenia    - Patient seen by Hematology/Oncology on 6/7/18  - Will consult Hospital Medicine team for assistance managing the patient's co-morbidities.          Seizures    - On Keppra 500 BID  - Patient endorses compliance  - Last Seizure of in February of this year  - Managed per Hospital team        ESRD (end stage renal disease) on dialysis    - On Hemodialysis (T, TH, SAT)  - Last HD - 6/19/2018  - Cr on Admit 3.6  - Nephrology Consulted        Renovascular hypertension    - On Procardia 60 XL  - BP: (136-192)/() 186/115  - Managed per Hospital team          Liver replaced by transplant    - S/P Transplant in 1992  - On Chronic Immune Suppression  - On Prednisone, Tacrolimus and Mycophenolate            Maddy Butler MD  Obstetrics & Gynecology  [facility]    Doing well, no questions,no problems.  I have reviewed the resident's note, evaluated the patient and agree with the diagnosis and management plan

## 2018-06-21 NOTE — DISCHARGE SUMMARY
[facility]  Obstetrics & Gynecology  Discharge Summary    Patient Name: Holly Patel  MRN: [id number]  Admission Date: 2018  Hospital Length of Stay: 1 days  Discharge Date and Time:  2018 12:19 PM  Attending Physician: No att. providers found   Discharging Provider: Maddy Butler MD  Primary Care Provider: Stan Sosa MD    HPI:  Ms. Patel is a 27 year old  with numerous co-morbidities including ESRD - Hemodialysis dependent, s/p liver transplant (, on chronic immune suppression), Anemia and and thrombocytopenia, seizure disorder and depression.  The patient underwent an uncomplicated LEEP procedure on 6/15/2018. She was subsequently discharged without complications.  She stated that she had light vaginal bleeding the night of the procedure but she noticed significant the day following the surgery.  Since that time she has experienced persistent bleeding, saturating more than a pad per hour on multiple occasions over the past few days.  She then contacted her primary OBGYN today who suggested that she come to the ED for evaluation.      In the ED the patient was found to be in no acute distress but with noted significant vaginal bleeding.  Her CBC was grossly abnormal with a WBC of 1.5 and an H/H of 5.6/17.6 and platelets of 54.  On pelvic exam the patient had noted persistent bleeding from the cervix which was not controlled by pressure or Monsels in the ED.  Two units of PRBCs were ordered in the ED and the decision was made to do an exam under anesthesia for possible surgical management of the persistent vaginal bleeding.       Hospital Course:  2018 - Patient to ED for persistent vaginal bleeding after LEEP procedure. Unsuccessful management of bleeding in ED.  Pancytopenic on admission. 2 units of PRBCs ordered.  Patient to OR surgical management of bleeding s/p LEEP  2018 - POD # 1 s/p EUA/Suture Ligation of Cervix.  Patient doing well  post-operatively.  Walking and voiding.  Has not eaten.  Denies pain, fever or chills. Has only had vaginal spotting overnight.  Appropriate rise in H/H. Surgically stable once tolerating diet.     Procedure(s) (LRB):  Exam under anesthesia (N/A)  SUTURE REPAIR,CERVIX     Consults         Status Ordering Provider     Inpatient consult to Obstetrics / Gynecology  Once     Provider:  Casie Salmeron, JUAN Wagner          Significant Diagnostic Studies: Labs:   CBC   Recent Labs  Lab 06/19/18  1220 06/20/18  0049 06/20/18  0457   WBC 1.50* 2.85* 3.39*   HGB 5.6* 7.2* 7.2*   HCT 17.6* 21.8* 22.1*   PLT 54* 71* 73*       Pending Diagnostic Studies:     None        Final Active Diagnoses:    Diagnosis Date Noted POA    PRINCIPAL PROBLEM:  Vaginal bleeding [N93.9] 10/11/2015 Yes    Depression [F32.9] 06/19/2018 Yes    Thrombocytopenia [D69.6] 05/16/2018 Yes    Pancytopenia [D61.818] 04/14/2018 Yes    Seizures [R56.9]  Yes    ESRD (end stage renal disease) on dialysis [N18.6, Z99.2]  Not Applicable    Acute blood loss anemia [D62] 10/12/2015 Yes    Renovascular hypertension [I15.0] 10/02/2015 Yes     Chronic   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 [Z94.4] 09/10/2012 Not Applicable     Chronic      Problems Resolved During this Admission:    Diagnosis Date Noted Date Resolved POA        Discharged Condition: stable    Disposition: Home or Self Care    Follow Up:  Follow-up Information     Go to Neelam Marroquin MD.    Specialties:  Obstetrics, Obstetrics and Gynecology  Why:  Post OP as scheduled with clinic  Contact information:  [address] [address]  [phone]                 Patient Instructions:     Activity as tolerated     Lifting restrictions     Other restrictions (specify):   Order Comments: Pelvic rest until post op visit. (No sex, no tampons, no douching, etc.)     Call MD for:  temperature >100.4     Call MD for:  persistent nausea and vomiting or diarrhea      Call MD for:  severe uncontrolled pain     Call MD for:  difficulty breathing or increased cough     Call MD for:  severe persistent headache     Call MD for:  persistent dizziness, light-headedness, or visual disturbances     Call MD for:  increased confusion or weakness     Call MD for:   Order Comments: Vaginal bleeding through one or more pads each hour for 2 hours     Call MD for:  redness, tenderness, or signs of infection (pain, swelling, redness, odor or green/yellow discharge around incision site)       Medications:  Reconciled Home Medications:      Medication List      CHANGE how you take these medications    cinacalcet 30 MG Tab  Commonly known as:  SENSIPAR  Take 1 tablet (30 mg total) by mouth daily with breakfast.  What changed:  · when to take this  · additional instructions     hydrALAZINE 50 MG tablet  Commonly known as:  APRESOLINE  Take 1 tablet (50 mg total) by mouth every 8 (eight) hours.  What changed:  when to take this     labetalol 200 MG tablet  Commonly known as:  NORMODYNE  Take 2 tablets (400 mg total) by mouth every 8 (eight) hours.  What changed:  when to take this     NIFEdipine 90 MG (OSM) 24 hr tablet  Commonly known as:  PROCARDIA-XL  Take 1 tablet (90 mg total) by mouth once daily.  What changed:  Another medication with the same name was removed. Continue taking this medication, and follow the directions you see here.     ondansetron 4 MG tablet  Commonly known as:  ZOFRAN  Take 1 tablet (4 mg total) by mouth every 6 (six) hours as needed for Nausea.  What changed:  when to take this     triamcinolone acetonide 0.1% 0.1 % ointment  Commonly known as:  KENALOG  AAA on arms, legs, and neck bid x 1-2 wks then prn flares only  What changed:  additional instructions        CONTINUE taking these medications    acetaminophen-codeine 300-30mg 300-30 mg Tab  Commonly known as:  TYLENOL #3  Take 1 tablet by mouth every 6 (six) hours as needed.     aspirin 81 MG Chew  Take 1 tablet (81  mg total) by mouth once daily.     citalopram 20 MG tablet  Commonly known as:  CELEXA  Take 1 tablet (20 mg total) by mouth once daily.     cloNIDine 0.3 mg/24 hr td ptwk 0.3 mg/24 hr  Commonly known as:  CATAPRES  Place 1 patch onto the skin every 7 days.     famotidine 40 MG tablet  Commonly known as:  PEPCID  Take 1 tablet (40 mg total) by mouth once daily.     food supplemt, lactose-reduced Liqd  Commonly known as:  ENSURE ACTIVE HIGH PROTEIN  Take 236 mLs by mouth 2 (two) times daily.     loratadine 10 mg tablet  Commonly known as:  CLARITIN  Take 1 tablet (10 mg total) by mouth once daily.     montelukast 10 mg tablet  Commonly known as:  SINGULAIR  Take 1 tablet (10 mg total) by mouth every evening.     mycophenolate 250 mg Cap  Commonly known as:  CELLCEPT  Take 1,000 mg by mouth 2 (two) times daily.     predniSONE 1 MG tablet  Commonly known as:  DELTASONE  Take 1 mg by mouth every other day.     tacrolimus 1 MG Cap  Commonly known as:  PROGRAF  Take 7 capsules (7 mg total) by mouth every 12 (twelve) hours.            Maddy Butler MD  Obstetrics & Gynecology  [facility]        Doing well, no complaints, ready for D/C.

## 2018-06-22 ENCOUNTER — HOSPITAL ENCOUNTER (INPATIENT)
Facility: HOSPITAL | Age: 28
LOS: 1 days | Discharge: HOME | DRG: 304 | End: 2018-06-23
Attending: EMERGENCY MEDICINE | Admitting: INTERNAL MEDICINE
Payer: MEDICARE

## 2018-06-22 DIAGNOSIS — I10 HYPERTENSION: ICD-10-CM

## 2018-06-22 DIAGNOSIS — R10.9 ABDOMINAL PAIN, UNSPECIFIED ABDOMINAL LOCATION: ICD-10-CM

## 2018-06-22 DIAGNOSIS — I16.1 HYPERTENSIVE EMERGENCY: Primary | ICD-10-CM

## 2018-06-22 LAB
ALBUMIN SERPL BCP-MCNC: 2.8 G/DL
ALBUMIN SERPL-MCNC: 3 G/DL (ref 3.3–5.5)
ALP SERPL-CCNC: 517 U/L (ref 42–141)
ALP SERPL-CCNC: 607 U/L
ALT SERPL W/O P-5'-P-CCNC: 16 U/L
ANION GAP SERPL CALC-SCNC: 12 MMOL/L
AST SERPL-CCNC: 42 U/L
BASOPHILS # BLD AUTO: 0.02 K/UL
BASOPHILS NFR BLD: 0.4 %
BILIRUB SERPL-MCNC: 0.7 MG/DL (ref 0.2–1.6)
BILIRUB SERPL-MCNC: 0.8 MG/DL
BUN SERPL-MCNC: 42 MG/DL (ref 7–22)
BUN SERPL-MCNC: 48 MG/DL
CALCIUM SERPL-MCNC: 9.2 MG/DL
CALCIUM SERPL-MCNC: 9.2 MG/DL (ref 8–10.3)
CHLORIDE SERPL-SCNC: 101 MMOL/L
CHLORIDE SERPL-SCNC: 97 MMOL/L (ref 98–108)
CO2 SERPL-SCNC: 25 MMOL/L
CREAT SERPL-MCNC: 8.5 MG/DL (ref 0.6–1.2)
CREAT SERPL-MCNC: 9.2 MG/DL
DIFFERENTIAL METHOD: ABNORMAL
EOSINOPHIL # BLD AUTO: 0.5 K/UL
EOSINOPHIL NFR BLD: 8.7 %
ERYTHROCYTE [DISTWIDTH] IN BLOOD BY AUTOMATED COUNT: 17.2 %
EST. GFR  (AFRICAN AMERICAN): 6 ML/MIN/1.73 M^2
EST. GFR  (NON AFRICAN AMERICAN): 5 ML/MIN/1.73 M^2
GLUCOSE SERPL-MCNC: 90 MG/DL (ref 73–118)
GLUCOSE SERPL-MCNC: 95 MG/DL
HCT VFR BLD AUTO: 23.6 %
HGB BLD-MCNC: 8 G/DL
LYMPHOCYTES # BLD AUTO: 0.9 K/UL
LYMPHOCYTES NFR BLD: 16 %
MCH RBC QN AUTO: 27.9 PG
MCHC RBC AUTO-ENTMCNC: 33.9 G/DL
MCV RBC AUTO: 82 FL
MONOCYTES # BLD AUTO: 0.3 K/UL
MONOCYTES NFR BLD: 4.8 %
NEUTROPHILS # BLD AUTO: 3.7 K/UL
NEUTROPHILS NFR BLD: 69.5 %
PLATELET # BLD AUTO: 97 K/UL
PMV BLD AUTO: 10.2 FL
POC ALT (SGPT): 21 U/L (ref 10–47)
POC AST (SGOT): 49 U/L (ref 11–38)
POC B-TYPE NATRIURETIC PEPTIDE: 2470 PG/ML (ref 0–100)
POC TCO2: 26 MMOL/L (ref 18–33)
POTASSIUM BLD-SCNC: 4.3 MMOL/L (ref 3.6–5.1)
POTASSIUM SERPL-SCNC: 4.7 MMOL/L
PROT SERPL-MCNC: 7.7 G/DL
PROTEIN, POC: 7.5 G/DL (ref 6.4–8.1)
RBC # BLD AUTO: 2.87 M/UL
SODIUM BLD-SCNC: 140 MMOL/L (ref 128–145)
SODIUM SERPL-SCNC: 138 MMOL/L
WBC # BLD AUTO: 5.38 K/UL

## 2018-06-22 PROCEDURE — 25000003 PHARM REV CODE 250: Performed by: INTERNAL MEDICINE

## 2018-06-22 PROCEDURE — 25000003 PHARM REV CODE 250: Performed by: EMERGENCY MEDICINE

## 2018-06-22 PROCEDURE — 36415 COLL VENOUS BLD VENIPUNCTURE: CPT

## 2018-06-22 PROCEDURE — 96365 THER/PROPH/DIAG IV INF INIT: CPT

## 2018-06-22 PROCEDURE — 80053 COMPREHEN METABOLIC PANEL: CPT

## 2018-06-22 PROCEDURE — 80100016 HC MAINTENANCE HEMODIALYSIS

## 2018-06-22 PROCEDURE — 85025 COMPLETE CBC W/AUTO DIFF WBC: CPT

## 2018-06-22 PROCEDURE — 93010 ELECTROCARDIOGRAM REPORT: CPT | Mod: ,,, | Performed by: INTERNAL MEDICINE

## 2018-06-22 PROCEDURE — 93005 ELECTROCARDIOGRAM TRACING: CPT

## 2018-06-22 PROCEDURE — 96375 TX/PRO/DX INJ NEW DRUG ADDON: CPT

## 2018-06-22 PROCEDURE — 63600175 PHARM REV CODE 636 W HCPCS: Performed by: EMERGENCY MEDICINE

## 2018-06-22 PROCEDURE — 96366 THER/PROPH/DIAG IV INF ADDON: CPT

## 2018-06-22 PROCEDURE — 20000000 HC ICU ROOM

## 2018-06-22 PROCEDURE — 83880 ASSAY OF NATRIURETIC PEPTIDE: CPT

## 2018-06-22 PROCEDURE — 99285 EMERGENCY DEPT VISIT HI MDM: CPT | Mod: 25

## 2018-06-22 RX ORDER — MORPHINE SULFATE 8 MG/ML
4 INJECTION INTRAMUSCULAR; INTRAVENOUS; SUBCUTANEOUS
Status: COMPLETED | OUTPATIENT
Start: 2018-06-22 | End: 2018-06-22

## 2018-06-22 RX ORDER — DIPHENHYDRAMINE HCL 25 MG
25 CAPSULE ORAL EVERY 6 HOURS PRN
Status: DISCONTINUED | OUTPATIENT
Start: 2018-06-22 | End: 2018-06-23 | Stop reason: HOSPADM

## 2018-06-22 RX ORDER — LABETALOL HYDROCHLORIDE 5 MG/ML
20 INJECTION, SOLUTION INTRAVENOUS
Status: COMPLETED | OUTPATIENT
Start: 2018-06-22 | End: 2018-06-22

## 2018-06-22 RX ORDER — LABETALOL HYDROCHLORIDE 5 MG/ML
40 INJECTION, SOLUTION INTRAVENOUS
Status: COMPLETED | OUTPATIENT
Start: 2018-06-22 | End: 2018-06-22

## 2018-06-22 RX ORDER — SODIUM CHLORIDE 9 MG/ML
INJECTION, SOLUTION INTRAVENOUS
Status: DISCONTINUED | OUTPATIENT
Start: 2018-06-22 | End: 2018-06-23

## 2018-06-22 RX ORDER — CLONIDINE 0.3 MG/24H
1 PATCH, EXTENDED RELEASE TRANSDERMAL
Status: DISCONTINUED | OUTPATIENT
Start: 2018-06-22 | End: 2018-06-23 | Stop reason: HOSPADM

## 2018-06-22 RX ORDER — SODIUM CHLORIDE 9 MG/ML
INJECTION, SOLUTION INTRAVENOUS ONCE
Status: DISCONTINUED | OUTPATIENT
Start: 2018-06-22 | End: 2018-06-23

## 2018-06-22 RX ORDER — NICARDIPINE HYDROCHLORIDE 0.2 MG/ML
2.5 INJECTION INTRAVENOUS CONTINUOUS
Status: DISCONTINUED | OUTPATIENT
Start: 2018-06-22 | End: 2018-06-23 | Stop reason: HOSPADM

## 2018-06-22 RX ORDER — HYDRALAZINE HYDROCHLORIDE 25 MG/1
50 TABLET, FILM COATED ORAL 2 TIMES DAILY
Status: DISCONTINUED | OUTPATIENT
Start: 2018-06-22 | End: 2018-06-23

## 2018-06-22 RX ORDER — NIFEDIPINE 30 MG/1
90 TABLET, EXTENDED RELEASE ORAL DAILY
Status: DISCONTINUED | OUTPATIENT
Start: 2018-06-23 | End: 2018-06-23 | Stop reason: HOSPADM

## 2018-06-22 RX ADMIN — NICARDIPINE HYDROCHLORIDE 10 MG/HR: 0.2 INJECTION, SOLUTION INTRAVENOUS at 08:06

## 2018-06-22 RX ADMIN — MORPHINE SULFATE 4 MG: 8 INJECTION INTRAVENOUS at 01:06

## 2018-06-22 RX ADMIN — HYDRALAZINE HYDROCHLORIDE 50 MG: 25 TABLET ORAL at 09:06

## 2018-06-22 RX ADMIN — DIPHENHYDRAMINE HYDROCHLORIDE 25 MG: 25 CAPSULE ORAL at 07:06

## 2018-06-22 RX ADMIN — LABETALOL HYDROCHLORIDE 40 MG: 5 INJECTION, SOLUTION INTRAVENOUS at 02:06

## 2018-06-22 RX ADMIN — NICARDIPINE HYDROCHLORIDE 2.5 MG/HR: 0.2 INJECTION, SOLUTION INTRAVENOUS at 03:06

## 2018-06-22 RX ADMIN — DIPHENHYDRAMINE HYDROCHLORIDE 25 MG: 25 CAPSULE ORAL at 11:06

## 2018-06-22 RX ADMIN — CLONIDINE 1 PATCH: 0.3 PATCH TRANSDERMAL at 06:06

## 2018-06-22 RX ADMIN — LABETALOL HYDROCHLORIDE 20 MG: 5 INJECTION, SOLUTION INTRAVENOUS at 02:06

## 2018-06-22 RX ADMIN — MORPHINE SULFATE 4 MG: 8 INJECTION INTRAVENOUS at 02:06

## 2018-06-22 NOTE — ED PROVIDER NOTES
"Encounter Date: 2018       History     Chief Complaint   Patient presents with    Abdominal Pain     pt reports abdominal swelling and pain since yesterday, denies going to dialysis since Tuesday     27-year-old female with a history of liver transplant, end-stage renal disease requiring dialysis, and a recent LEEP procedure requiring cervical suture presents with left side abdominal pain since this morning.  She reports she missed her dialysis yesterday because she "just didn't feel well".            Review of patient's allergies indicates:   Allergen Reactions    Chloral hydrate      Other reaction(s): Hallucinations  Other reaction(s): Hives    Hydrocodone Other (See Comments)     Mental status changes     Past Medical History:   Diagnosis Date    Anemia in ESRD (end-stage renal disease) 10/12/2015    dialysis tues, thursday, sat; access left arm    Chronic rejection of liver transplant 3/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 ()    Moderate protein-calorie malnutrition 2017    MRSA bacteremia 2017    Pneumonia     Prophylactic immunotherapy 2014    Renovascular hypertension 10/2/2015    Secondary hyperparathyroidism 2017    Seizures     Sialadenitis 3/21/2018    Thrombocytopenia 2016    Thrombocytopenia 2016     Past Surgical History:   Procedure Laterality Date     SECTION      x 2    CONIZATION OF CERVIX USING LOOP ELECTROSURGICAL EXCISION PROCEDURE (LEEP) N/A 6/15/2018    Procedure: CONIZATION-CERVICAL-LEEP;  Surgeon: Neelam Marroquin MD;  Location: " [facility] OR;  Service: OB/GYN;  Laterality: N/A;    EXAMINATION UNDER ANESTHESIA N/A 6/19/2018    Procedure: Exam under anesthesia;  Surgeon: Neelam Marroquin MD;  Location: [facility] OR;  Service: OB/GYN;  Laterality: N/A;    LIVER BIOPSY      LIVER TRANSPLANT  09/1992    PERINEORRHAPHY  6/19/2018    Procedure: SUTURE REPAIR,CERVIX;  Surgeon: Neelam Marroquin MD;  Location: [facility] OR;  Service: OB/GYN;;    TUBAL LIGATION  2010     Family History   Problem Relation Age of Onset    Hypertension Mother     Hypertension Father     Melanoma Neg Hx     Breast cancer Neg Hx     Colon cancer Neg Hx     Ovarian cancer Neg Hx      Social History   Substance Use Topics    Smoking status: Never Smoker    Smokeless tobacco: Never Used    Alcohol use No     Review of Systems   Constitutional: Negative for chills and fever.   HENT: Negative for rhinorrhea and sore throat.    Eyes: Negative.    Respiratory: Positive for cough. Negative for shortness of breath.    Cardiovascular: Negative for chest pain, palpitations and leg swelling.   Gastrointestinal: Positive for abdominal pain. Negative for diarrhea, nausea and vomiting.   Genitourinary: Positive for vaginal bleeding (spotting).        Patient anuric   Musculoskeletal: Negative for back pain and neck pain.   Skin: Negative for rash and wound.   Neurological: Negative for weakness and numbness.       Physical Exam     Initial Vitals   BP Pulse Resp Temp SpO2   06/22/18 1301 06/22/18 1245 06/22/18 1245 06/22/18 1245 06/22/18 1245   (!) 222/144 101 20 98 °F (36.7 °C) 100 %      MAP       --                Physical Exam    Nursing note and vitals reviewed.  Constitutional: She appears well-developed and well-nourished. She is cooperative. No distress.   HENT:   Head: Normocephalic and atraumatic.   Eyes: Conjunctivae and lids are normal.   Neck: Phonation normal. Neck supple. No spinous process tenderness present.   Cardiovascular: Normal rate, regular rhythm and normal  heart sounds.   Pulses:       Radial pulses are 2+ on the right side, and 2+ on the left side.        Dorsalis pedis pulses are 2+ on the right side, and 2+ on the left side.   Good thrill palpated to vascular graft in the left forearm .  No pitting edema bilateral lower extremities.   Pulmonary/Chest: Effort normal. She has decreased breath sounds. She has no wheezes.   Abdominal: Soft. Bowel sounds are normal. There is tenderness in the left upper quadrant and left lower quadrant. There is guarding. There is no rebound and no CVA tenderness.       Musculoskeletal:        Cervical back: Normal.        Thoracic back: Normal.        Lumbar back: Normal.   Neurological: She is alert and oriented to person, place, and time. She has normal strength. No sensory deficit. Gait normal.   Skin: Skin is warm and dry.         ED Course   Procedures  Labs Reviewed   POCT B-TYPE NATRIURETIC PEPTIDE (BNP) - Abnormal; Notable for the following:        Result Value    POC B-Type Natriuretic Peptide 2470 (*)     All other components within normal limits   POCT CMP - Abnormal; Notable for the following:     Albumin, POC 3.0 (*)     Alkaline Phosphatase,  (*)     AST (SGOT), POC 49 (*)     POC BUN 42 (*)     POC Chloride 97 (*)     POC Creatinine 8.5 (*)     All other components within normal limits   POCT CBC   POCT B-TYPE NATRIURETIC PEPTIDE (BNP)   POCT CMP     EKG Readings: (Independently Interpreted)   Initial Reading: No STEMI. Previous EKG: Compared with most recent EKG Previous EKG Date: 5/26/2018. Rhythm: Normal Sinus Rhythm. Heart Rate: 92. Ectopy: No Ectopy. Other Impression: LVH   Normal sinus rhythm  Left ventricular hypertrophy  No significant change from previous       Imaging Results          CT Abdomen Pelvis  Without Contrast (Final result)  Result time 06/22/18 14:27:18    Final result by Daniele Monroy MD (06/22/18 14:27:18)                 Impression:      Small left-sided pleural effusion and small to  moderate right-sided pleural effusion.    Moderate volume ascites.    Patchy opacities within the lung bases likely representing pulmonary edema although pneumonia and atelectasis are also considerations.    Stable 8 mm right middle lobe pulmonary nodule.    Sclerotic changes within the bones consistent with renal osteodystrophy.    Surgical changes related to liver transplantation.    Marked splenomegaly.    Atrophic kidneys.    Low-density material identified in the region of the cervix which may be related to fluid within the endocervical canal.  Low-density material adjacent to the cervix within the vagina may be related to Surgicel.      Electronically signed by: Daniele Monroy MD  Date:    06/22/2018  Time:    14:27             Narrative:    EXAMINATION:  CT ABDOMEN PELVIS WITHOUT CONTRAST    CLINICAL HISTORY:  abdominal pain;    TECHNIQUE:  Low dose axial images, sagittal and coronal reformations were obtained from the lung bases to the pubic symphysis.  Oral contrast was not administered.    COMPARISON:  01/17/2018.    FINDINGS:  There is a small left-sided pleural effusion present and a small to moderate right-sided pleural effusion.  There is patchy opacity within the lung bases which may be related to consolidation from pulmonary edema or pneumonia versus atelectatic changes.  There is an 8 mm pulmonary nodule within the right middle lobe, not significantly changed.  There are sclerotic changes within the bones with multiple vertebral endplate lucencies and findings are suggestive of renal osteodystrophy.    The patient is status post liver transplant.  No focal abnormalities of the liver are appreciated on this noncontrast examination.  The spleen is markedly enlarged measuring approximately 19.5 cm in length.  No focal abnormalities of the spleen are evident.  The stomach and pancreas appear grossly unremarkable.  The adrenal glands do not appear enlarged.  The kidneys are atrophic consistent with  patient's history of end-stage renal disease.  The abdominal aorta tapers normally without aneurysmal dilatation.  No para-aortic lymphadenopathy is identified.  There is a moderate volume of ascites.  The uterus is present.  No abnormal adnexal masses are appreciated.  There is low-density identified in the region of the cervix which may be related to fluid within the endocervical canal.  There is also low-density material containing air within the vagina which may be related to Surgicel.  There is no evidence for pelvic or inguinal lymphadenopathy.  The urinary bladder is decompressed.                               X-Ray Chest PA And Lateral (Final result)  Result time 06/22/18 14:02:41    Final result by Fredrick Parisi MD (06/22/18 14:02:41)                 Impression:      Progression of findings as described above      Electronically signed by: Fredrick Parisi MD  Date:    06/22/2018  Time:    14:02             Narrative:    EXAMINATION:  XR CHEST PA AND LATERAL    CLINICAL HISTORY:  Essential (primary) hypertension    TECHNIQUE:  PA and lateral views of the chest were performed.    COMPARISON:  Chest radiograph dated June 18, 2018    FINDINGS:  The trachea and cardiomediastinal silhouette are within normal limits.  When compared with the prior study, there is progression of right lower lobe opacity suspicious for worsening pneumonia with a superimposed parapneumonic effusion..  Osseous structures demonstrate no evidence for acute fractures or dislocations.                                 Medical Decision Making:   History:   Old Records Summarized: records from previous admission(s).       <> Summary of Records: Extensive emergency in the past responded to nicardipine.  Initial Assessment:   Liver transplant and end-stage renal disease with abdominal pain and very high blood pressure  Differential Diagnosis:   Hypertensive emergency, acute abdomen, vaginal bleeding  Clinical Tests:   Lab Tests: Ordered and  Reviewed  The following lab test(s) were unremarkable: CBC, CMP and BNP  Radiological Study: Ordered and Reviewed  ED Management:  Patient was initially given labetalol 20 mg slow IV push followed by 40 mg slow IV push with no improvement in her blood pressure.  At that point a nicardipine drip was started.  I discussed the patient [facility], who sepsis patient in transfer to that facility.  Other:   I have discussed this case with another health care provider.                   ED Course as of Jun 22 1645 Fri Jun 22, 2018   1602 Patient discussed with Dr. Reeves, critical care medicine at [facility].  He [place] [facility] [place] and asked me consult nephrologist.  [LR]   1608 Patient was discussed with Dr. Huber critical care medicine at [facility].  Except patient in transfer to their facility for further evaluation and treatment of hypertensive emergency.  [LR]   1641 POC B-Type Natriuretic Peptide: (!) 2470 [LR]   1641 Albumin, POC: (!) 3.0 [LR]   1641 Alkaline Phosphatase, POC: (!) 517 [LR]   1641 POC BUN: (!) 42 [LR]   1641 POC Creatinine: (!) 8.5 [LR]   1642 CBC shows a white blood cell count of 4.6, hemoglobin of 8.2, hematocrit 24, platelet count of 77.  [LR]   1643 CT the abdomen and pelvis showed moderate volume ascites moderate right sided effusion and likely pulmonary edema.  No acute findings.  Chest x-ray showed worsening of right lower lobe opacity suggesting what worsening pneumonia or superimposed effusion.  [LR]      ED Course User Index  [LR] Ebony Rice MD     Clinical Impression:   The primary encounter diagnosis was Hypertensive emergency. Diagnoses of Hypertension and Abdominal pain, unspecified abdominal location were also pertinent to this visit.                             Ebony Rice MD  06/22/18 1700

## 2018-06-22 NOTE — PROGRESS NOTES
Pt admitted to ICU from [facility] on cardene gtt. She is a normal T/TH/Sat HD pt and she missed HD yesterday. She had a LEEP procedure on her cervix on the 15th, had some subsequent vaginal bleeding, and today had some lower abdominal pain to which she went to the ER for. She arrives in no acute distress, on room air, cardene gtt at 7.5 mg/hr. Nephrology consulted and Dialysis provider contacted and states they will be here in a couple hours.

## 2018-06-23 VITALS
SYSTOLIC BLOOD PRESSURE: 129 MMHG | BODY MASS INDEX: 17.89 KG/M2 | HEART RATE: 93 BPM | RESPIRATION RATE: 26 BRPM | HEIGHT: 65 IN | TEMPERATURE: 98 F | WEIGHT: 107.38 LBS | OXYGEN SATURATION: 97 % | DIASTOLIC BLOOD PRESSURE: 77 MMHG

## 2018-06-23 LAB
ANION GAP SERPL CALC-SCNC: 11 MMOL/L
BUN SERPL-MCNC: 24 MG/DL
CALCIUM SERPL-MCNC: 9.2 MG/DL
CHLORIDE SERPL-SCNC: 102 MMOL/L
CO2 SERPL-SCNC: 24 MMOL/L
CREAT SERPL-MCNC: 5.7 MG/DL
EST. GFR  (AFRICAN AMERICAN): 11 ML/MIN/1.73 M^2
EST. GFR  (NON AFRICAN AMERICAN): 9 ML/MIN/1.73 M^2
GLUCOSE SERPL-MCNC: 118 MG/DL
POTASSIUM SERPL-SCNC: 3.9 MMOL/L
SODIUM SERPL-SCNC: 137 MMOL/L

## 2018-06-23 PROCEDURE — 80197 ASSAY OF TACROLIMUS: CPT

## 2018-06-23 PROCEDURE — 80048 BASIC METABOLIC PNL TOTAL CA: CPT

## 2018-06-23 PROCEDURE — 25000003 PHARM REV CODE 250: Performed by: INTERNAL MEDICINE

## 2018-06-23 PROCEDURE — 25000003 PHARM REV CODE 250: Performed by: HOSPITALIST

## 2018-06-23 PROCEDURE — 25000003 PHARM REV CODE 250: Performed by: EMERGENCY MEDICINE

## 2018-06-23 PROCEDURE — 80100016 HC MAINTENANCE HEMODIALYSIS

## 2018-06-23 PROCEDURE — 63600175 PHARM REV CODE 636 W HCPCS: Performed by: HOSPITALIST

## 2018-06-23 PROCEDURE — 36415 COLL VENOUS BLD VENIPUNCTURE: CPT

## 2018-06-23 PROCEDURE — 63600175 PHARM REV CODE 636 W HCPCS: Mod: JG | Performed by: INTERNAL MEDICINE

## 2018-06-23 RX ORDER — ACETAMINOPHEN 325 MG/1
650 TABLET ORAL EVERY 6 HOURS PRN
Status: DISCONTINUED | OUTPATIENT
Start: 2018-06-23 | End: 2018-06-23 | Stop reason: HOSPADM

## 2018-06-23 RX ORDER — NAPROXEN SODIUM 220 MG/1
81 TABLET, FILM COATED ORAL DAILY
Status: DISCONTINUED | OUTPATIENT
Start: 2018-06-23 | End: 2018-06-23 | Stop reason: HOSPADM

## 2018-06-23 RX ORDER — HYDRALAZINE HYDROCHLORIDE 25 MG/1
50 TABLET, FILM COATED ORAL EVERY 8 HOURS
Status: DISCONTINUED | OUTPATIENT
Start: 2018-06-23 | End: 2018-06-23 | Stop reason: HOSPADM

## 2018-06-23 RX ORDER — SODIUM CHLORIDE 9 MG/ML
INJECTION, SOLUTION INTRAVENOUS ONCE
Status: DISCONTINUED | OUTPATIENT
Start: 2018-06-23 | End: 2018-06-23 | Stop reason: HOSPADM

## 2018-06-23 RX ORDER — CINACALCET 30 MG/1
30 TABLET, FILM COATED ORAL
Status: DISCONTINUED | OUTPATIENT
Start: 2018-06-23 | End: 2018-06-23 | Stop reason: HOSPADM

## 2018-06-23 RX ORDER — TACROLIMUS 1 MG/1
7 CAPSULE ORAL 2 TIMES DAILY
Status: DISCONTINUED | OUTPATIENT
Start: 2018-06-23 | End: 2018-06-23 | Stop reason: HOSPADM

## 2018-06-23 RX ORDER — LABETALOL 100 MG/1
400 TABLET, FILM COATED ORAL EVERY 8 HOURS
Status: DISCONTINUED | OUTPATIENT
Start: 2018-06-23 | End: 2018-06-23 | Stop reason: HOSPADM

## 2018-06-23 RX ORDER — NIFEDIPINE 30 MG/1
90 TABLET, EXTENDED RELEASE ORAL DAILY
Status: DISCONTINUED | OUTPATIENT
Start: 2018-06-23 | End: 2018-06-23

## 2018-06-23 RX ORDER — CITALOPRAM 20 MG/1
20 TABLET, FILM COATED ORAL DAILY
Status: DISCONTINUED | OUTPATIENT
Start: 2018-06-23 | End: 2018-06-23 | Stop reason: HOSPADM

## 2018-06-23 RX ORDER — NIFEDIPINE 30 MG/1
90 TABLET, EXTENDED RELEASE ORAL ONCE
Status: COMPLETED | OUTPATIENT
Start: 2018-06-23 | End: 2018-06-23

## 2018-06-23 RX ORDER — CLONIDINE 0.3 MG/24H
1 PATCH, EXTENDED RELEASE TRANSDERMAL
Status: DISCONTINUED | OUTPATIENT
Start: 2018-06-23 | End: 2018-06-23

## 2018-06-23 RX ORDER — MYCOPHENOLATE MOFETIL 250 MG/1
1000 CAPSULE ORAL 2 TIMES DAILY
Status: DISCONTINUED | OUTPATIENT
Start: 2018-06-23 | End: 2018-06-23 | Stop reason: HOSPADM

## 2018-06-23 RX ORDER — SODIUM CHLORIDE 9 MG/ML
INJECTION, SOLUTION INTRAVENOUS
Status: DISCONTINUED | OUTPATIENT
Start: 2018-06-23 | End: 2018-06-23 | Stop reason: HOSPADM

## 2018-06-23 RX ORDER — MONTELUKAST SODIUM 10 MG/1
10 TABLET ORAL NIGHTLY
Status: DISCONTINUED | OUTPATIENT
Start: 2018-06-23 | End: 2018-06-23 | Stop reason: HOSPADM

## 2018-06-23 RX ORDER — PREDNISONE 1 MG/1
1 TABLET ORAL EVERY OTHER DAY
Status: DISCONTINUED | OUTPATIENT
Start: 2018-06-23 | End: 2018-06-23 | Stop reason: HOSPADM

## 2018-06-23 RX ADMIN — TACROLIMUS 7 MG: 1 CAPSULE ORAL at 09:06

## 2018-06-23 RX ADMIN — NICARDIPINE HYDROCHLORIDE 12 MG/HR: 0.2 INJECTION, SOLUTION INTRAVENOUS at 12:06

## 2018-06-23 RX ADMIN — LABETALOL HYDROCHLORIDE 400 MG: 100 TABLET, FILM COATED ORAL at 01:06

## 2018-06-23 RX ADMIN — HYDRALAZINE HYDROCHLORIDE 50 MG: 25 TABLET ORAL at 01:06

## 2018-06-23 RX ADMIN — MONTELUKAST SODIUM 10 MG: 10 TABLET, FILM COATED ORAL at 03:06

## 2018-06-23 RX ADMIN — NIFEDIPINE 90 MG: 30 TABLET, FILM COATED, EXTENDED RELEASE ORAL at 09:06

## 2018-06-23 RX ADMIN — HYDRALAZINE HYDROCHLORIDE 50 MG: 25 TABLET ORAL at 05:06

## 2018-06-23 RX ADMIN — ACETAMINOPHEN 650 MG: 325 TABLET, FILM COATED ORAL at 02:06

## 2018-06-23 RX ADMIN — NICARDIPINE HYDROCHLORIDE 5 MG/HR: 0.2 INJECTION, SOLUTION INTRAVENOUS at 04:06

## 2018-06-23 RX ADMIN — EPOETIN ALFA 4000 UNITS: 4000 SOLUTION INTRAVENOUS; SUBCUTANEOUS at 09:06

## 2018-06-23 RX ADMIN — ASPIRIN 81 MG: 81 TABLET, CHEWABLE ORAL at 09:06

## 2018-06-23 RX ADMIN — CITALOPRAM HYDROBROMIDE 20 MG: 20 TABLET ORAL at 09:06

## 2018-06-23 RX ADMIN — LABETALOL HYDROCHLORIDE 400 MG: 100 TABLET, FILM COATED ORAL at 05:06

## 2018-06-23 RX ADMIN — MYCOPHENOLATE MOFETIL 1000 MG: 250 CAPSULE ORAL at 09:06

## 2018-06-23 RX ADMIN — PREDNISONE 1 MG: 1 TABLET ORAL at 09:06

## 2018-06-23 RX ADMIN — NIFEDIPINE 90 MG: 30 TABLET, FILM COATED, EXTENDED RELEASE ORAL at 01:06

## 2018-06-23 RX ADMIN — TACROLIMUS 7 MG: 1 CAPSULE ORAL at 07:06

## 2018-06-23 NOTE — PLAN OF CARE
06/23/18 1129   Discharge Assessment   Assessment Type Discharge Planning Reassessment   Confirmed/corrected address and phone number on facesheet? Yes   Assessment information obtained from? Patient   Expected Length of Stay (days) 3   Prior to hospitilization cognitive status: Alert/Oriented   Prior to hospitalization functional status: Independent   Current cognitive status: Alert/Oriented   Current Functional Status: Independent   Facility Arrived From: Emergency/Home   Lives With child(ester), dependent;parent(s)   Able to Return to Prior Arrangements yes   Is patient able to care for self after discharge? Yes   Patient's perception of discharge disposition home or selfcare   Readmission Within The Last 30 Days current reason for admission unrelated to previous admission   Patient currently being followed by outpatient case management? No   Patient currently receives home health services? No   Patient currently receives any other outside agency services? No   Equipment Currently Used at Home none   Do you have any problems affording any of your prescribed medications? No   Is the patient taking medications as prescribed? yes   Does the patient have transportation home? Yes   Transportation Available family or friend will provide   Discharge Plan A Home with family   Discharge Plan B Home with family   Patient/Family In Agreement With Plan yes   Does the patient currently use HME? Yes   Does the patient receive outpatient dialysis? Yes   Are there any open cases? Yes   Readmission Questionnaire   At the time of your discharge, did someone talk to you about what your health problems were? Yes   At the time of discharge, did someone talk to you about what to watch out for regarding worsening of your health problem? Yes   At the time of discharge, did someone talk to you about which medication to take when you left the hospital and which ones to stop taking? Yes   What do you believe caused you to be sick enough  "to be re-admitted? Stomach pain   How often do you need to have someone help you when you read instructions, pamphlets, or other written material from your doctor or pharmacy? Never   Do you have problems taking your medications as prescribed? No   Do you have problems obtaining/receiving your medications? No   Does the patient have transportation to healthcare appointments? Yes   Living Arrangements house   Does the patient have family/friends to help with healtcare needs after discharge? yes   Does your caregiver provide all the help you need? Yes   Are you currently feeling confused? No   Are you currently having problems thinking? No   Are you currently having memory problems? No   Have you felt down, depressed, or hopeless? 0   Have you felt little interest or pleasure in doing things? 0   In the last 7 days, my sleep quality was: fair   SW to patient's room to discuss Helping the patient manage care at home.   SW role explained to pt.     "Discharge planning begins on Admission" pamphlet discussed and placed in "My Health Packet" and placed at bedside.     TN's name and contact info placed on white board.   Pt. Provides her mother,  Ms. Myrna Randolph is her caregiver. Ms. Randolph's telephone number [phone]    Preferred Appointment time: Pt prefers morning appointment. Pt prefers appointments on Monday, Wednesday and Friday    Preferred pharmacy:   [facility]/pharmacy #[id number] - [place] - [address]  [address]  [place] [address]  Phone: [phone] Fax: [phone]    [facility]  [address] [address]  Phone: [phone] Fax: [phone]    "

## 2018-06-23 NOTE — MEDICAL/APP STUDENT
"[facility] Medicine  Medical Student History and Physical    Patient Name: Holly Patel  MRN: [id number]   Admission Date: 6/22/2018  Length of Stay: 0 days  Attending Physician: Aren Hale MD      Subjective:     Principal Problem:<principal problem not specified>    Chief Complaint:   Chief Complaint   Patient presents with    Abdominal Pain     pt reports abdominal swelling and pain since yesterday, denies going to dialysis since Tuesday     HPI:   Ms. Patel is a 27 y.o. female w PMH of hemangioendothelioma s/p liver transplant (1992), ESRD on hemodialysis (T/Th/Sa), FANNIE, anemia of chronic disease, and persistently uncontrolled HTN who presents as a transfer from [facility] with hypertensive emergency (up to 280/190). Patient reports missing dialysis treatment yesterday (6/21) due to feeling "unwell," left abdominal pain radiating to the left lower back, and facial swelling. Pain is described as sharp, previously constant but now intermittent, worse with movement, and not related to meals. Last bowel movement was earlier in the day, last meal at lunchtime. Patient also complains of generalized pruritis since this morning (6/22) that is not relieved by p.o. Benadryl.   Patient has been on hemodialysis for 4 years, with left AV fistula, and is anuric. She has had several hospital admissions for infections and hypertensive emergency in the past 6 months, of note 3/14/18 hypertensive emergency with seizures (takes home Keppra 500mg bid) and 5/27-5/30 hospital admission with sepsis 2/2 bacteremia/pneumonia (discharged on Cefazolin for 14d treatment of MSSA bacteremia). Patient had LEEP surgery of cervix one week ago (6/15) for (+) HPV "other high risk type" (not 16 or 18), and subsequent hospital admission on 6/19-6/20 for post-surgical vaginal bleeding and anemia.   Patient denies chest pain, SOB, N/V/D. Denies fever, chills, or night sweats. Reports weight loss of 30 " lbs over past 2 months. Patient has chronic left-sided, periorbital headaches for which she takes home Tylenol; currently denies headache, lightheadedness, dizziness, diplopia, or neck stiffness.     Review of Systems   Constitutional: Positive for unexpected weight change (30 lb loss in 2 months). Negative for appetite change, chills and fever.   HENT: Negative for mouth sores, rhinorrhea, sinus pain, sore throat and trouble swallowing.         Edema of face/anterior neck   Eyes: Negative for photophobia and visual disturbance.   Respiratory: Negative for cough, chest tightness and shortness of breath.    Cardiovascular: Negative for chest pain, palpitations and leg swelling.   Gastrointestinal: Positive for abdominal distention and abdominal pain (left sided). Negative for anal bleeding, diarrhea, nausea and vomiting.   Genitourinary: Positive for vaginal bleeding (spotting). Negative for pelvic pain.        Anuric.   Musculoskeletal: Positive for back pain (left lower back). Negative for neck pain and neck stiffness.   Skin: Negative for color change and rash.   Neurological: Negative for dizziness, tremors, seizures (last sz 3/14/18), syncope, light-headedness, numbness and headaches.       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)    Moderate protein-calorie malnutrition 8/16/2017    MRSA bacteremia 8/6/2017    Pneumonia      Prophylactic immunotherapy 2014    Renovascular hypertension 10/2/2015    Secondary hyperparathyroidism 2017    Seizures     Sialadenitis 3/21/2018    Thrombocytopenia 2016    Thrombocytopenia 2016     Past Surgical History:   Procedure Laterality Date     SECTION      x 2    CONIZATION OF CERVIX USING LOOP ELECTROSURGICAL EXCISION PROCEDURE (LEEP) N/A 6/15/2018    Procedure: CONIZATION-CERVICAL-LEEP;  Surgeon: Neelam Marroquin MD;  Location: [facility];  Service: OB/GYN;  Laterality: N/A;    EXAMINATION UNDER ANESTHESIA N/A 2018    Procedure: Exam under anesthesia;  Surgeon: Neelam Marroquin MD;  Location[facility];  Service: OB/GYN;  Laterality: N/A;    LIVER BIOPSY      LIVER TRANSPLANT  1992    PERINEORRHAPHY  2018    Procedure: SUTURE REPAIR,CERVIX;  Surgeon: Neelam Marroquin MD;  Location: [facility];  Service: OB/GYN;;    TUBAL LIGATION        Review of patient's allergies indicates:   Allergen Reactions    Chloral hydrate      Other reaction(s): Hallucinations  Other reaction(s): Hives    Hydrocodone Other (See Comments)     Mental status changes     No current facility-administered medications on file prior to encounter.      Current Outpatient Prescriptions on File Prior to Encounter   Medication Sig    acetaminophen-codeine 300-30mg (TYLENOL #3) 300-30 mg Tab Take 1 tablet by mouth every 6 (six) hours as needed.    aspirin 81 MG Chew Take 1 tablet (81 mg total) by mouth once daily.    cinacalcet (SENSIPAR) 30 MG Tab Take 1 tablet (30 mg total) by mouth daily with breakfast. (Patient taking differently: Take 30 mg by mouth. On Tuesday, Thursday, and Saturday with dialysis)    citalopram (CELEXA) 20 MG tablet Take 1 tablet (20 mg total) by mouth once daily.    cloNIDine 0.3 mg/24 hr td ptwk (CATAPRES) 0.3 mg/24 hr Place 1 patch onto the skin every 7 days.    famotidine (PEPCID) 40 MG tablet Take 1 tablet (40 mg total) by mouth once daily.     food supplemt, lactose-reduced (ENSURE ACTIVE HIGH PROTEIN) Liqd Take 236 mLs by mouth 2 (two) times daily.    hydrALAZINE (APRESOLINE) 50 MG tablet Take 1 tablet (50 mg total) by mouth every 8 (eight) hours. (Patient taking differently: Take 50 mg by mouth 2 (two) times daily. )    labetalol (NORMODYNE) 200 MG tablet Take 2 tablets (400 mg total) by mouth every 8 (eight) hours. (Patient taking differently: Take 400 mg by mouth every 12 (twelve) hours. )    loratadine (CLARITIN) 10 mg tablet Take 1 tablet (10 mg total) by mouth once daily.    montelukast (SINGULAIR) 10 mg tablet Take 1 tablet (10 mg total) by mouth every evening.    mycophenolate (CELLCEPT) 250 mg Cap Take 1,000 mg by mouth 2 (two) times daily.    NIFEdipine (PROCARDIA-XL) 90 MG (OSM) 24 hr tablet Take 1 tablet (90 mg total) by mouth once daily.    ondansetron (ZOFRAN) 4 MG tablet Take 1 tablet (4 mg total) by mouth every 6 (six) hours as needed for Nausea. (Patient taking differently: Take 4 mg by mouth once daily. )    predniSONE (DELTASONE) 1 MG tablet Take 1 mg by mouth every other day.    tacrolimus (PROGRAF) 1 MG Cap Take 7 capsules (7 mg total) by mouth every 12 (twelve) hours.    triamcinolone acetonide 0.1% (KENALOG) 0.1 % ointment AAA on arms, legs, and neck bid x 1-2 wks then prn flares only (Patient taking differently: Apply to affected area(s) on arms, legs, and neck twice daily x 1-2 wks then as needed for flares only)      Family History   Problem Relation Age of Onset    Hypertension Mother     Hypertension Father     Melanoma Neg Hx     Breast cancer Neg Hx     Colon cancer Neg Hx     Ovarian cancer Neg Hx      Social History Main Topics    Smoking status: Never Smoker    Smokeless tobacco: Never Used    Alcohol use No    Drug use: No    Sexual activity: No       Objective:     Vital Signs (Most Recent):  Temp: 98 °F (36.7 °C) (06/22/18 1901)  Pulse: 106 (06/22/18 2037)  Resp: (!) 40 (06/22/18 2037)  BP: (!)  192/88 (06/22/18 2032)  SpO2: 95 % (06/22/18 2037) Vital Signs (24h Range):  Temp:  [98 °F (36.7 °C)] 98 °F (36.7 °C)  Pulse:  [] 106  Resp:  [18-57] 40  SpO2:  [80 %-100 %] 95 %  BP: (137-280)/() 192/88     Weight: 52.9 kg (116 lb 10 oz)  Body mass index is 19.41 kg/m².    Intake/Output Summary (Last 24 hours) at 06/22/18 2051  Last data filed at 06/22/18 1800   Gross per 24 hour   Intake            29.38 ml   Output                0 ml   Net            29.38 ml      Physical Exam   Constitutional: She is oriented to person, place, and time.   HENT:   Head: Normocephalic and atraumatic.   Mouth/Throat: Oropharynx is clear and moist.   Edema of face/anterior neck   Eyes: EOM are normal. Pupils are equal, round, and reactive to light.   Neck: No JVD present.   Cardiovascular: Regular rhythm, normal heart sounds and intact distal pulses.    Pulmonary/Chest: Effort normal. No stridor.   Breath sounds decreased bilaterally   Abdominal: She exhibits distension (ascites). There is tenderness (LUQ & LLQ). There is guarding. There is no rebound.   Musculoskeletal: She exhibits no edema (peripherally) or deformity.   Lymphadenopathy:     She has no cervical adenopathy.   Neurological: She is alert and oriented to person, place, and time.     Significant Labs:   Recent Results (from the past 24 hour(s))   POCT CMP    Collection Time: 06/22/18  1:08 PM   Result Value Ref Range    Albumin, POC 3.0 (L) 3.3 - 5.5 g/dL    Alkaline Phosphatase,  (H) 42 - 141 U/L    ALT (SGPT), POC 21 10 - 47 U/L    AST (SGOT), POC 49 (H) 11 - 38 U/L    POC BUN 42 (H) 7 - 22 mg/dL    Calcium, POC 9.2 8.0 - 10.3 mg/dL    POC Chloride 97 (L) 98 - 108 mmol/L    POC Creatinine 8.5 (H) 0.6 - 1.2 mg/dL    POC Glucose 90 73 - 118 mg/dL    POC Potassium 4.3 3.6 - 5.1 mmol/L    POC Sodium 140 128 - 145 mmol/L    Bilirubin 0.7 0.2 - 1.6 mg/dL    POC TCO2 26 18 - 33 mmol/L    Protein 7.5 6.4 - 8.1 g/dL   POCT B-type natriuretic peptide (BNP)     Collection Time: 06/22/18  1:25 PM   Result Value Ref Range    POC B-Type Natriuretic Peptide 2470 (H) 0.0 - 100.0 pg/mL   CBC auto differential    Collection Time: 06/22/18  5:56 PM   Result Value Ref Range    WBC 5.38 3.90 - 12.70 K/uL    RBC 2.87 (L) 4.00 - 5.40 M/uL    Hemoglobin 8.0 (L) 12.0 - 16.0 g/dL    Hematocrit 23.6 (L) 37.0 - 48.5 %    MCV 82 82 - 98 fL    MCH 27.9 27.0 - 31.0 pg    MCHC 33.9 32.0 - 36.0 g/dL    RDW 17.2 (H) 11.5 - 14.5 %    Platelets 97 (L) 150 - 350 K/uL    MPV 10.2 9.2 - 12.9 fL    Gran # (ANC) 3.7 1.8 - 7.7 K/uL    Lymph # 0.9 (L) 1.0 - 4.8 K/uL    Mono # 0.3 0.3 - 1.0 K/uL    Eos # 0.5 0.0 - 0.5 K/uL    Baso # 0.02 0.00 - 0.20 K/uL    Gran% 69.5 38.0 - 73.0 %    Lymph% 16.0 (L) 18.0 - 48.0 %    Mono% 4.8 4.0 - 15.0 %    Eosinophil% 8.7 (H) 0.0 - 8.0 %    Basophil% 0.4 0.0 - 1.9 %    Differential Method Automated    Comprehensive metabolic panel    Collection Time: 06/22/18  5:56 PM   Result Value Ref Range    Sodium 138 136 - 145 mmol/L    Potassium 4.7 3.5 - 5.1 mmol/L    Chloride 101 95 - 110 mmol/L    CO2 25 23 - 29 mmol/L    Glucose 95 70 - 110 mg/dL    BUN, Bld 48 (H) 6 - 20 mg/dL    Creatinine 9.2 (H) 0.5 - 1.4 mg/dL    Calcium 9.2 8.7 - 10.5 mg/dL    Total Protein 7.7 6.0 - 8.4 g/dL    Albumin 2.8 (L) 3.5 - 5.2 g/dL    Total Bilirubin 0.8 0.1 - 1.0 mg/dL    Alkaline Phosphatase 607 (H) 55 - 135 U/L    AST 42 (H) 10 - 40 U/L    ALT 16 10 - 44 U/L    Anion Gap 12 8 - 16 mmol/L    eGFR if African American 6 (A) >60 mL/min/1.73 m^2    eGFR if non African American 5 (A) >60 mL/min/1.73 m^2       Significant Imaging:  CXR 6/22/18:  Progression of right lower lobe opacity suspicious for worsening pneumonia with a superimposed parapneumonic effusion. Osseous structures demonstrate no evidence for acute fractures or dislocations.    CT Abdomen Pelvis 6/22/18:  - Small left-sided pleural effusion and small to moderate right-sided pleural effusion.  - Moderate volume ascites.  -  "Patchy opacities within the lung bases likely representing pulmonary edema although pneumonia and atelectasis are also considerations.  - Stable 8 mm right middle lobe pulmonary nodule.  - Sclerotic changes within the bones consistent with renal osteodystrophy.  - Surgical changes related to liver transplantation.  - Marked splenomegaly.  - Atrophic kidneys.  - Low-density material identified in the region of the cervix which may be related to fluid within the endocervical canal.  Low-density material adjacent to the cervix within the vagina may be related to Surgicel.      Assessment/Plan:     Hypertensive Emergency  - Secondary to missed hemodialysis on 6/21  - In ED, given Labetalol 20mg slow IV push followed by 40mg slow IV push -- did not improve BP  - continuous IV Nicardipine 2.5 mg/hr  - Continue home meds Hydralazine 50mg bid, Clonidine 0.3mg/24h weekly patch  - Continuous telemetry/BP monitoring  - Consult to Nephrology for inpt hemodialysis    Pleural effusions  - "Small left-sided pleural effusion and small to moderate right-sided pleural effusion" seen on CT  - Likely secondary to volume overload and/or pneumonia  - O2 sats stable ORA, tachypnea in 30s-40s  - Should spontaneously resolve    Abdominal pain (left-sided)  - Given IV morphine 4mg x2 in ED  - Tylenol prn    Anemia  - Hb 8.0, Hct 23.6, MCV 82.  - Has improved since 6/19 post-surg vaginal bleeding loss (Hb 5.6)  - Continue trending H&H, transfuse if Hb<7    Diet/Code status  - Diet: Low sodium, renal diet.  - Code status: Full code.    VTE Risk Mitigation     None          To be discussed with team and staff, Aren Hale MD.    Bebe Ordonez, medical student  "

## 2018-06-23 NOTE — H&P
"[facility] Medicine  History & Physical    Patient Name: Holly Patel  MRN: [id number]  Admission Date: 2018  Attending Physician: Philip Sargent MD, MPH      PCP:     Stan Sosa MD    CC:     Chief Complaint   Patient presents with    Abdominal Pain     pt reports abdominal swelling and pain since yesterday, denies going to dialysis since Tuesday       HISTORY OF PRESENT ILLNESS:     Holly Patel is a 27 y.o. female that (in part)  has a past medical history of Anemia in ESRD (end-stage renal disease); Chronic rejection of liver transplant; Depression; Encounter for blood transfusion; ESRD on hemodialysis; History of recent hospitalization; History of splenomegaly; Immunosuppressed; Iron deficiency anemia secondary to inadequate dietary iron intake; Liver replaced by transplant; Moderate protein-calorie malnutrition; MRSA bacteremia; Pneumonia; Prophylactic immunotherapy; Renovascular hypertension; Secondary hyperparathyroidism; Seizures; Sialadenitis; Thrombocytopenia; and Thrombocytopenia.  has a past surgical history that includes Liver transplant (1992); Liver biopsy;  section; Tubal ligation (); conization of cervix using loop electrosurgical excision procedure (leep) (N/A, 6/15/2018); Examination under anesthesia (N/A, 2018); and Perineorrhaphy (2018).   Presents to [facility] from the [facility] for treatment of hypertensive emergency.   At the [facility] she had hypertensive emergency (up to 280/190). Patient reports missing dialysis treatment yesterday () due to feeling "unwell," left abdominal pain radiating to the left lower back, and facial swelling. Pain is described as sharp, previously constant but now intermittent, worse with movement, and not related to meals. Last bowel movement was earlier in the day, last meal at lunchtime. Patient also complains of generalized " "pruritis since this morning (6/22) that is not relieved by p.o. Benadryl.                Patient has been on hemodialysis for 4 years, with left AV fistula, and is anuric. She has had several hospital admissions for infections and hypertensive emergency in the past 6 months, of note 3/14/18 hypertensive emergency with seizures (takes home Keppra 500mg bid) and 5/27-5/30 hospital admission with sepsis 2/2 bacteremia/pneumonia (discharged on Cefazolin for 14d treatment of MSSA bacteremia). Patient had LEEP surgery of cervix one week ago (6/15) for (+) HPV "other high risk type" (not 16 or 18), and subsequent hospital admission on 6/19-6/20 for post-surgical vaginal bleeding and anemia.                Patient denies chest pain, SOB, N/V/D. Denies fever, chills, or night sweats. Reports weight loss of 30 lbs over past 2 months. Patient has chronic left-sided, periorbital headaches for which she takes home Tylenol; currently denies headache, lightheadedness, dizziness, diplopia, or neck stiffness.     Hospital medicine has been asked to admit for further evaluation and treatment.       REVIEW OF SYSTEMS:     Constitutional: Positive for unexpected weight change (30 lb loss in 2 months). Negative for appetite change, chills and fever.   HENT: Negative for mouth sores, rhinorrhea, sinus pain, sore throat and trouble swallowing.         Edema of face/anterior neck   Eyes: Negative for photophobia and visual disturbance.   Respiratory: Negative for cough, chest tightness and shortness of breath.    Cardiovascular: Negative for chest pain, palpitations and leg swelling.   Gastrointestinal: Positive for abdominal distention and abdominal pain (left sided). Negative for anal bleeding, diarrhea, nausea and vomiting.   Genitourinary: Positive for vaginal bleeding (spotting). Negative for pelvic pain.        Anuric.   Musculoskeletal: Positive for back pain (left lower back). Negative for neck pain and neck stiffness.   Skin: " Negative for color change and rash.   Neurological: Negative for dizziness, tremors, seizures (last sz 3/14/18), syncope, light-headedness, numbness and headaches.         PAST MEDICAL / SURGICAL HISTORY:     Past Medical History:   Diagnosis Date    Anemia in ESRD (end-stage renal disease) 10/12/2015    dialysis tues, thursday, sat; access left arm    Chronic rejection of liver transplant 3/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 ()    Moderate protein-calorie malnutrition 2017    MRSA bacteremia 2017    Pneumonia     Prophylactic immunotherapy 2014    Renovascular hypertension 10/2/2015    Secondary hyperparathyroidism 2017    Seizures     Sialadenitis 3/21/2018    Thrombocytopenia 2016    Thrombocytopenia 2016     Past Surgical History:   Procedure Laterality Date     SECTION      x 2    CONIZATION OF CERVIX USING LOOP ELECTROSURGICAL EXCISION PROCEDURE (LEEP) N/A 6/15/2018    Procedure: CONIZATION-CERVICAL-LEEP;  Surgeon: Neelam Marroquin MD;  Location: [facility];  Service: OB/GYN;  Laterality: N/A;    EXAMINATION UNDER ANESTHESIA N/A 2018    Procedure: Exam under anesthesia;  Surgeon: Neelam Marroquin MD;  Location: [facility], [place], operated by [facility];  Service: OB/GYN;  Laterality: N/A;    LIVER BIOPSY      LIVER TRANSPLANT  1992    PERINEORRHAPHY  2018    Procedure: SUTURE REPAIR,CERVIX;  Surgeon: Neelam Marroquin MD;  Location: [facility], [place], [place] by [facility];  Service: OB/GYN;;    TUBAL LIGATION           FAMILY HISTORY:     Family History   Problem Relation Age of Onset     Hypertension Mother     Hypertension Father     Melanoma Neg Hx     Breast cancer Neg Hx     Colon cancer Neg Hx     Ovarian cancer Neg Hx          SOCIAL HISTORY:     Social History     Social History    Marital status: Legally      Spouse name: N/A    Number of children: N/A    Years of education: N/A     Social History Main Topics    Smoking status: Never Smoker    Smokeless tobacco: Never Used    Alcohol use No    Drug use: No    Sexual activity: No     Other Topics Concern    Are You Pregnant Or Think You May Be? No    Breast-Feeding No     Social History Narrative    Lives 2 kids, 7 and 8, and nephew. Her mom helps with kids.         ALLERGIES:       Review of patient's allergies indicates:   Allergen Reactions    Chloral hydrate      Other reaction(s): Hallucinations  Other reaction(s): Hives    Hydrocodone Other (See Comments)     Mental status changes         HEALTH SCREENING:     Influenza vaccine  up-to-date for this season.  Prevnar 13 pneumonia vaccine =  evidence of previous vaccination found in the medical record      HOME MEDICATIONS:     Prior to Admission medications    Medication Sig Start Date End Date Taking? Authorizing Provider   aspirin 81 MG Chew Take 1 tablet (81 mg total) by mouth once daily. 2/21/18 2/21/19 Yes Farheen Tellez MD   cinacalcet (SENSIPAR) 30 MG Tab Take 1 tablet (30 mg total) by mouth daily with breakfast. 1/26/18 1/26/19 Yes Jacky Escalera MD   cloNIDine 0.2 mg/24 hr td ptwk (CATAPRES) 0.2 mg/24 hr Place 1 patch onto the skin every 7 days. Change every Wednesday 3/14/18 3/14/19 Yes Stan Sosa MD   famotidine (PEPCID) 20 MG tablet Take 1 tablet (20 mg total) by mouth 2 (two) times daily. 1/7/18 1/7/19 Yes Rosales Liu MD   hydrALAZINE (APRESOLINE) 50 MG tablet Take 1 tablet (50 mg total) by mouth every 8 (eight) hours. 5/19/18  Yes Viktor Bass MD   labetalol (NORMODYNE) 200 MG tablet Take 2 tablets (400 mg total) by  mouth every 8 (eight) hours. 3/14/18  Yes Stan Sosa MD   lacosamide (VIMPAT) 50 mg Tab Take by mouth every 12 (twelve) hours.   Yes Historical Provider, MD   levETIRAcetam (KEPPRA) 500 MG Tab Take 500 mg by mouth 2 (two) times daily.   Yes Historical Provider, MD   mycophenolate (CELLCEPT) 250 mg Cap Take 1,000 mg by mouth 2 (two) times daily.   Yes Historical Provider, MD   NIFEdipine (PROCARDIA-XL) 90 MG (OSM) 24 hr tablet Take 1 tablet (90 mg total) by mouth once daily.  Patient taking differently: Take 60 mg by mouth once daily.  5/19/18  Yes Viktor Bass MD   predniSONE (DELTASONE) 1 MG tablet Take 1 mg by mouth every other day.   Yes Historical Provider, MD   tacrolimus (PROGRAF) 1 MG Cap Take 7 capsules (7 mg total) by mouth every 12 (twelve) hours. 3/23/18 3/23/19 Yes Lei Pinedo MD   butalbital-acetaminophen-caffeine -40 mg (FIORICET, ESGIC) -40 mg per tablet Take 1 tablet by mouth every 6 (six) hours as needed for Headaches. 5/3/18 6/2/18  Kendra Del Valle MD   citalopram (CELEXA) 20 MG tablet Take 1 tablet (20 mg total) by mouth once daily. 4/25/18 4/25/19  SAM Huerta   food supplemt, lactose-reduced (ENSURE ACTIVE HIGH PROTEIN) Liqd Take 236 mLs by mouth 2 (two) times daily. 8/16/17   Galindo Amor MD   ondansetron (ZOFRAN) 4 MG tablet Take 1 tablet (4 mg total) by mouth every 6 (six) hours. 4/25/18   SAM Huerta   ondansetron (ZOFRAN) 4 MG tablet Take 1 tablet (4 mg total) by mouth every 6 (six) hours as needed for Nausea. 5/3/18   Kendra Del Valle MD   triamcinolone acetonide 0.1% (KENALOG) 0.1 % ointment AAA on arms, legs, and neck bid x 1-2 wks then prn flares only  Patient taking differently: Apply to affected area(s) on arms, legs, and neck twice daily x 1-2 wks then as needed for flares only 12/31/14   Diana Grider MD          [facility] MEDICATIONS:     Scheduled Meds:    sodium chloride 0.9%   Intravenous Once    cloNIDine  0.3 mg/24 hr td ptwk  1 patch Transdermal Q7 Days    hydrALAZINE  50 mg Oral BID    NIFEdipine  90 mg Oral Daily     Continuous Infusions:    niCARdipine 10 mg/hr (06/23/18 0145)     PRN Meds: sodium chloride 0.9%, diphenhydrAMINE      PHYSICAL EXAM:     Wt Readings from Last 1 Encounters:   06/23/18 0112 48.7 kg (107 lb 5.8 oz)   06/22/18 1900 52.9 kg (116 lb 10 oz)   06/22/18 1245 52.6 kg (116 lb)     Body mass index is 17.87 kg/m².  Vitals:    06/23/18 0118 06/23/18 0122 06/23/18 0132 06/23/18 0145   BP: (!) 151/92  (!) 157/73    Pulse:  (!) 111  109   Resp:  (!) 34  (!) 21   Temp:       TempSrc:       SpO2:  (!) 94%  (!) 92%   Weight:       Height:              -- General appearance: well developed. appears stated age   -- Head: normocephalic, atraumatic   -- Eyes: conjunctivae is pale. Extraocular muscles intact  -- Nose: Nares normal. Septum midline.   -- Mouth/Throat: Pale oral mucosa.  no throat erythema.   -- Neck: supple, symmetrical, trachea midline, no JVD and thyroid not grossly enlarged, appears symmetric  -- Lungs: Decreased breath sounds on right inferior lung field with faint inspiratory crackles. normal respiratory effort. No use of accessory muscles.   -- Chest wall: no tenderness. equal bilateral chest rise   -- Heart: Rapid rate and regular rhythm. S1, S2 normal.  no click, rub or gallop   -- Abdomen: soft, non-tender, non-distended, non-tympanic; bowel sounds normal; no masses  -- Extremities: +AV fistula in the left arm with palpable thrill.  no cyanosis, clubbing or edema.  Decreased muscle mass  -- Pulses: 2+ and symmetric   -- Skin: color normal, texture normal, turgor normal. No rashes or lesions.   -- Neurologic: Normal strength and tone. No focal numbness or weakness. CNII-XII intact. Lockwood coma scale: eyes open spontaneously-4, oriented & converses-5, obeys commands-6.      LABORATORY STUDIES:     Recent Results (from the past 36 hour(s))   POCT CMP    Collection Time: 06/22/18   1:08 PM   Result Value Ref Range    Albumin, POC 3.0 (L) 3.3 - 5.5 g/dL    Alkaline Phosphatase,  (H) 42 - 141 U/L    ALT (SGPT), POC 21 10 - 47 U/L    AST (SGOT), POC 49 (H) 11 - 38 U/L    POC BUN 42 (H) 7 - 22 mg/dL    Calcium, POC 9.2 8.0 - 10.3 mg/dL    POC Chloride 97 (L) 98 - 108 mmol/L    POC Creatinine 8.5 (H) 0.6 - 1.2 mg/dL    POC Glucose 90 73 - 118 mg/dL    POC Potassium 4.3 3.6 - 5.1 mmol/L    POC Sodium 140 128 - 145 mmol/L    Bilirubin 0.7 0.2 - 1.6 mg/dL    POC TCO2 26 18 - 33 mmol/L    Protein 7.5 6.4 - 8.1 g/dL   POCT B-type natriuretic peptide (BNP)    Collection Time: 06/22/18  1:25 PM   Result Value Ref Range    POC B-Type Natriuretic Peptide 2470 (H) 0.0 - 100.0 pg/mL   CBC auto differential    Collection Time: 06/22/18  5:56 PM   Result Value Ref Range    WBC 5.38 3.90 - 12.70 K/uL    RBC 2.87 (L) 4.00 - 5.40 M/uL    Hemoglobin 8.0 (L) 12.0 - 16.0 g/dL    Hematocrit 23.6 (L) 37.0 - 48.5 %    MCV 82 82 - 98 fL    MCH 27.9 27.0 - 31.0 pg    MCHC 33.9 32.0 - 36.0 g/dL    RDW 17.2 (H) 11.5 - 14.5 %    Platelets 97 (L) 150 - 350 K/uL    MPV 10.2 9.2 - 12.9 fL    Gran # (ANC) 3.7 1.8 - 7.7 K/uL    Lymph # 0.9 (L) 1.0 - 4.8 K/uL    Mono # 0.3 0.3 - 1.0 K/uL    Eos # 0.5 0.0 - 0.5 K/uL    Baso # 0.02 0.00 - 0.20 K/uL    Gran% 69.5 38.0 - 73.0 %    Lymph% 16.0 (L) 18.0 - 48.0 %    Mono% 4.8 4.0 - 15.0 %    Eosinophil% 8.7 (H) 0.0 - 8.0 %    Basophil% 0.4 0.0 - 1.9 %    Differential Method Automated    Comprehensive metabolic panel    Collection Time: 06/22/18  5:56 PM   Result Value Ref Range    Sodium 138 136 - 145 mmol/L    Potassium 4.7 3.5 - 5.1 mmol/L    Chloride 101 95 - 110 mmol/L    CO2 25 23 - 29 mmol/L    Glucose 95 70 - 110 mg/dL    BUN, Bld 48 (H) 6 - 20 mg/dL    Creatinine 9.2 (H) 0.5 - 1.4 mg/dL    Calcium 9.2 8.7 - 10.5 mg/dL    Total Protein 7.7 6.0 - 8.4 g/dL    Albumin 2.8 (L) 3.5 - 5.2 g/dL    Total Bilirubin 0.8 0.1 - 1.0 mg/dL    Alkaline Phosphatase 607 (H) 55 - 135 U/L     AST 42 (H) 10 - 40 U/L    ALT 16 10 - 44 U/L    Anion Gap 12 8 - 16 mmol/L    eGFR if African American 6 (A) >60 mL/min/1.73 m^2    eGFR if non African American 5 (A) >60 mL/min/1.73 m^2       Lab Results   Component Value Date    INR 1.0 06/19/2018    INR 1.1 06/15/2018    INR 1.1 02/16/2018     Lab Results   Component Value Date    HGBA1C 4.5 01/24/2018     No results for input(s): POCTGLUCOSE in the last 72 hours.        MICROBIOLOGY DATA:     Urine Culture, Routine   Date Value Ref Range Status   01/29/2018   Final    ENTEROCOCCUS FAECALIS  >100,000 cfu/ml  No other significant isolate     01/17/2018 No significant growth  Final   01/11/2018 No significant growth  Final   12/17/2017   Final    STREPTOCOCCUS AGALACTIAE (GROUP B)  > 100,000 cfu/ml  Beta-hemolytic streptococci are routinely susceptible to   penicillins,cephalosporins and carbapenems.     04/13/2016 No significant growth  Final       Microbiology x 7d:   Microbiology Results (last 7 days)     ** No results found for the last 168 hours. **            IMAGING:     Imaging Results          CT Abdomen Pelvis  Without Contrast (Final result)  Result time 06/22/18 14:27:18    Final result by Daniele Monroy MD (06/22/18 14:27:18)                 Impression:      Small left-sided pleural effusion and small to moderate right-sided pleural effusion.    Moderate volume ascites.    Patchy opacities within the lung bases likely representing pulmonary edema although pneumonia and atelectasis are also considerations.    Stable 8 mm right middle lobe pulmonary nodule.    Sclerotic changes within the bones consistent with renal osteodystrophy.    Surgical changes related to liver transplantation.    Marked splenomegaly.    Atrophic kidneys.    Low-density material identified in the region of the cervix which may be related to fluid within the endocervical canal.  Low-density material adjacent to the cervix within the vagina may be related to  Surgicel.      Electronically signed by: Daniele Monroy MD  Date:    06/22/2018  Time:    14:27             Narrative:    EXAMINATION:  CT ABDOMEN PELVIS WITHOUT CONTRAST    CLINICAL HISTORY:  abdominal pain;    TECHNIQUE:  Low dose axial images, sagittal and coronal reformations were obtained from the lung bases to the pubic symphysis.  Oral contrast was not administered.    COMPARISON:  01/17/2018.    FINDINGS:  There is a small left-sided pleural effusion present and a small to moderate right-sided pleural effusion.  There is patchy opacity within the lung bases which may be related to consolidation from pulmonary edema or pneumonia versus atelectatic changes.  There is an 8 mm pulmonary nodule within the right middle lobe, not significantly changed.  There are sclerotic changes within the bones with multiple vertebral endplate lucencies and findings are suggestive of renal osteodystrophy.    The patient is status post liver transplant.  No focal abnormalities of the liver are appreciated on this noncontrast examination.  The spleen is markedly enlarged measuring approximately 19.5 cm in length.  No focal abnormalities of the spleen are evident.  The stomach and pancreas appear grossly unremarkable.  The adrenal glands do not appear enlarged.  The kidneys are atrophic consistent with patient's history of end-stage renal disease.  The abdominal aorta tapers normally without aneurysmal dilatation.  No para-aortic lymphadenopathy is identified.  There is a moderate volume of ascites.  The uterus is present.  No abnormal adnexal masses are appreciated.  There is low-density identified in the region of the cervix which may be related to fluid within the endocervical canal.  There is also low-density material containing air within the vagina which may be related to Surgicel.  There is no evidence for pelvic or inguinal lymphadenopathy.  The urinary bladder is decompressed.                               X-Ray Chest PA  And Lateral (Final result)  Result time 06/22/18 14:02:41    Final result by Fredrick Parisi MD (06/22/18 14:02:41)                 Impression:      Progression of findings as described above      Electronically signed by: Fredrick Parisi MD  Date:    06/22/2018  Time:    14:02             Narrative:    EXAMINATION:  XR CHEST PA AND LATERAL    CLINICAL HISTORY:  Essential (primary) hypertension    TECHNIQUE:  PA and lateral views of the chest were performed.    COMPARISON:  Chest radiograph dated June 18, 2018    FINDINGS:  The trachea and cardiomediastinal silhouette are within normal limits.  When compared with the prior study, there is progression of right lower lobe opacity suspicious for worsening pneumonia with a superimposed parapneumonic effusion..  Osseous structures demonstrate no evidence for acute fractures or dislocations.                                  CONSULTS:     IP CONSULT TO NEPHROLOGY       ASSESSMENT & PLAN:     Primary Diagnosis:  Hypertensive emergency    Active Hospital Problems    Diagnosis  POA    *Hypertensive emergency [I16.1]  Yes     Priority: 1 - High    Hypertension [I10]  Yes    Depression [F32.9]  Yes    Seizures [R56.9]  Yes    Immunosuppressed [D89.9]  Yes     Chronic    ESRD (end stage renal disease) on dialysis [N18.6, Z99.2]  Not Applicable    Renovascular hypertension [I15.0]  Yes     Chronic   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 [Z94.4]  Not Applicable     Chronic     hemangioendothelioma s/p LTx (1992)        Resolved Hospital Problems    Diagnosis Date Resolved POA   No resolved problems to display.     Hypertensive urgency with history of difficult to control hypertension   · Presents with a systolic blood pressure of approximately 280/190 mmHg.    · In order to decrease the risk of acute stroke, we will initiate blood pressure medications with a goal of a decrease of 30% in the next 24 hours.  MAP = 120 in first 24 hours.  · Thereafter, the goal while inpatient is a  systolic blood pressure less than 160mmHg  · BP not in acceptable range at this time  · Initiate oral regimen as well as providing PRN IV medications  · May need to escalate to Cardene drip for tighter blood pressure control  · Continue current regimen      Chronically Immunocompromised state secondary to History of liver transplant in 1992  · Secondary to hemangioendothelioma   · On CellCept and Prograf.  Check levels.    End-stage renal disease on dialysis  · No acute issues with significant volume overload, electrolyte abnormality, or acid-base abnormality at this time  · Will need routine dialysis while inpatient  · Nephrology consulted    Anemia in ESRD  Hemoglobin is 8.0;  concern there is hemodilution with end-stage renal disease and incomplete dialysis.  Marked Hypertension also consistent with volume overload.  Consult nephrology.  May need a blood transfusion with dialysis.  Anemia may be contributing to patient's generalized fatigue in addition to acute infection.    Seizure Disorder  · No acute issues  · Seizure restrictions are but not limited to: no driving for six months after last seizure; avoid swimming, high altitude activities, operating heavy machinery, bathing unattended, or engaging in activities in where a seizure will cause harm to self or others.  · F/u Neuro as an oupt.      VTE Risk Mitigation     None            Adult PRN medications available   DVT prophylaxis given       DISPOSITION:     Will admit to the Hospital Medicine service for further evaluation and treatment.    Chart reviewed and updated where applicable.    High Risk Conditions:  Patient has a condition that poses threat to life and bodily function: Hypertensive urgency    ===============================================================    Philip Sargent MD, MPH  Department of Hospital Medicine   [facility]  [phone] pg  (7pm - 6am)          This note is dictated using Dragon Medical 360 voice  recognition software.  There are word recognition mistakes that are occasionally missed on review.

## 2018-06-23 NOTE — PLAN OF CARE
Problem: Hemodialysis (Adult)  Goal: Signs and Symptoms of Listed Potential Problems Will be Absent, Minimized or Managed (Hemodialysis)  Signs and symptoms of listed potential problems will be absent, minimized or managed by discharge/transition of care (reference Hemodialysis (Adult) CPG).   Outcome: Ongoing (interventions implemented as appropriate)   06/22/18 2241   Hemodialysis   Problems Assessed (Hemodialysis) all   Problems Present (Hemodialysis) electrolyte imbalance;fluid imbalance   Hemodialysis in progress as ordered

## 2018-06-23 NOTE — PLAN OF CARE
Problem: Patient Care Overview  Goal: Plan of Care Review  Outcome: Ongoing (interventions implemented as appropriate)  Patient AAOx4. No acute distress. Afebrile. Sinus tach on cardiac monitor. Hypertensive. Weaning Cardene gtt off. Restarted home PO blood pressure meds. Rec'd dialysis during shift. Removed 3L of fluid during dialysis treatment. Skin intact. Patient able to ambulate without assist. Plan of care is to titrate Cardene drip off, and dialyze patient in AM.

## 2018-06-23 NOTE — PLAN OF CARE
Problem: Patient Care Overview  Goal: Plan of Care Review  Outcome: Ongoing (interventions implemented as appropriate)  Pt remains in ICU with plans to discharge home after her current hemodialysis treatment is completed. Cardene gtt weaned off today and home meds restarted. BP a little low during dialysis, better now. Tylenol given x1 for back pain, no other complaints throughout day. Remains free from fall, injury, or breakdown throughout shift.

## 2018-06-23 NOTE — DISCHARGE SUMMARY
"[facility]  Hospital Medicine  Discharge Summary      Patient Name: Holly Patel  MRN: [id number]  Admission Date: 2018  Hospital Length of Stay: 1 days  Discharge Date and Time:  2018 8:39 AM  Attending Physician: Aren Hale MD   Discharging Provider: Aern Hale MD  Primary Care Provider: Stan Sosa MD      HPI:     Holly Patel is a 27 y.o. female that (in part)  has a past medical history of Anemia in ESRD (end-stage renal disease); Chronic rejection of liver transplant; Depression; Encounter for blood transfusion; ESRD on hemodialysis; History of recent hospitalization; History of splenomegaly; Immunosuppressed; Iron deficiency anemia secondary to inadequate dietary iron intake; Liver replaced by transplant; Moderate protein-calorie malnutrition; MRSA bacteremia; Pneumonia; Prophylactic immunotherapy; Renovascular hypertension; Secondary hyperparathyroidism; Seizures; Sialadenitis; Thrombocytopenia; and Thrombocytopenia.  has a past surgical history that includes Liver transplant (1992); Liver biopsy;  section; Tubal ligation (); conization of cervix using loop electrosurgical excision procedure (leep) (N/A, 6/15/2018); Examination under anesthesia (N/A, 2018); and Perineorrhaphy (2018).   Presents to [facility] from the [facility] for treatment of hypertensive emergency.   At the [place] ED she had hypertensive emergency (up to 280/190). Patient reports missing dialysis treatment yesterday () due to feeling "unwell," left abdominal pain radiating to the left lower back, and facial swelling. Pain is described as sharp, previously constant but now intermittent, worse with movement, and not related to meals. Last bowel movement was earlier in the day, last meal at lunchtime. Patient also complains of generalized pruritis since this morning () that is not relieved by p.o. " "Benadryl.                Patient has been on hemodialysis for 4 years, with left AV fistula, and is anuric. She has had several hospital admissions for infections and hypertensive emergency in the past 6 months, of note 3/14/18 hypertensive emergency with seizures (takes home Keppra 500mg bid) and 5/27-5/30 hospital admission with sepsis 2/2 bacteremia/pneumonia (discharged on Cefazolin for 14d treatment of MSSA bacteremia). Patient had LEEP surgery of cervix one week ago (6/15) for (+) HPV "other high risk type" (not 16 or 18), and subsequent hospital admission on 6/19-6/20 for post-surgical vaginal bleeding and anemia.                Patient denies chest pain, SOB, N/V/D. Denies fever, chills, or night sweats. Reports weight loss of 30 lbs over past 2 months. Patient has chronic left-sided, periorbital headaches for which she takes home Tylenol; currently denies headache, lightheadedness, dizziness, diplopia, or neck stiffness.     Hospital medicine has been asked to admit for further evaluation and treatment.     * No surgery found *      Hospital Course:   The patient is a known T,Th,Sat dialysis patient. She missed dialysis 2 days ago. Came in with a hypertensive urgency and sent to the ICU on a Nicardipine drip. Nephrology was consulted and the patient underwent emergent dialysis. Nicardipine was weaned to off. The patient's BP was well controlled and she had no other symptoms.  She will have dialysis today and then be discharged to home from the ICU. No new meds. Diet- low NA. Activity- as tolerated. Follow up with PCP in one week. Patient had a better than expected response to treatment.       Consults:   Consults         Status Ordering Provider     Inpatient consult to Nephrology  Once     Provider:  Ginny Urbina MD    Acknowledged DAVID RUIZ     Inpatient consult to Pulmonology  Once     Provider:  Marshall Squires MD    Ordered GINNY URBINA          No new Assessment & Plan notes have been " filed under this hospital service since the last note was generated.  Service: Hospital Medicine    Final Active Diagnoses:    Diagnosis Date Noted POA    PRINCIPAL PROBLEM:  Hypertensive emergency [I16.1] 06/22/2018 Yes    Hypertension [I10] 06/22/2018 Yes    Depression [F32.9] 06/19/2018 Yes    Seizures [R56.9]  Yes    Immunosuppressed [D89.9] 08/05/2017 Yes     Chronic    ESRD (end stage renal disease) on dialysis [N18.6, Z99.2]  Not Applicable    Renovascular hypertension [I15.0] 10/02/2015 Yes     Chronic   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 [Z94.4] 09/10/2012 Not Applicable     Chronic      Problems Resolved During this Admission:    Diagnosis Date Noted Date Resolved POA       Discharged Condition: good    Disposition: Home or Self Care    Follow Up:  Follow-up Information     Stan Sosa MD In 1 week.    Specialty:  Internal Medicine  Contact information:  [address] [facility] [address]  [phone]                 Patient Instructions:   No discharge procedures on file.    Significant Diagnostic Studies:     Pending Diagnostic Studies:     Procedure Component Value Units Date/Time    Basic metabolic panel [[id number]] Collected:  06/23/18 0402    Order Status:  Sent Lab Status:  In process Updated:  06/23/18 0800    Specimen:  Blood from Blood     Tacrolimus level [[id number]] Collected:  06/23/18 0402    Order Status:  Sent Lab Status:  In process Updated:  06/23/18 0402    Specimen:  Blood from Blood          Medications:  Reconciled Home Medications:      Medication List      CHANGE how you take these medications    cinacalcet 30 MG Tab  Commonly known as:  SENSIPAR  Take 1 tablet (30 mg total) by mouth daily with breakfast.  What changed:  · when to take this  · additional instructions     hydrALAZINE 50 MG tablet  Commonly known as:  APRESOLINE  Take 1 tablet (50 mg total) by mouth every 8 (eight) hours.  What changed:  when to take this     labetalol 200 MG tablet  Commonly known as:   NORMODYNE  Take 2 tablets (400 mg total) by mouth every 8 (eight) hours.  What changed:  when to take this     ondansetron 4 MG tablet  Commonly known as:  ZOFRAN  Take 1 tablet (4 mg total) by mouth every 6 (six) hours as needed for Nausea.  What changed:  when to take this     triamcinolone acetonide 0.1% 0.1 % ointment  Commonly known as:  KENALOG  AAA on arms, legs, and neck bid x 1-2 wks then prn flares only  What changed:  additional instructions        CONTINUE taking these medications    acetaminophen-codeine 300-30mg 300-30 mg Tab  Commonly known as:  TYLENOL #3  Take 1 tablet by mouth every 6 (six) hours as needed.     aspirin 81 MG Chew  Take 1 tablet (81 mg total) by mouth once daily.     citalopram 20 MG tablet  Commonly known as:  CELEXA  Take 1 tablet (20 mg total) by mouth once daily.     cloNIDine 0.3 mg/24 hr td ptwk 0.3 mg/24 hr  Commonly known as:  CATAPRES  Place 1 patch onto the skin every 7 days.     famotidine 40 MG tablet  Commonly known as:  PEPCID  Take 1 tablet (40 mg total) by mouth once daily.     food supplemt, lactose-reduced Liqd  Commonly known as:  ENSURE ACTIVE HIGH PROTEIN  Take 236 mLs by mouth 2 (two) times daily.     loratadine 10 mg tablet  Commonly known as:  CLARITIN  Take 1 tablet (10 mg total) by mouth once daily.     montelukast 10 mg tablet  Commonly known as:  SINGULAIR  Take 1 tablet (10 mg total) by mouth every evening.     mycophenolate 250 mg Cap  Commonly known as:  CELLCEPT  Take 1,000 mg by mouth 2 (two) times daily.     NIFEdipine 90 MG (OSM) 24 hr tablet  Commonly known as:  PROCARDIA-XL  Take 1 tablet (90 mg total) by mouth once daily.     predniSONE 1 MG tablet  Commonly known as:  DELTASONE  Take 1 mg by mouth every other day.     tacrolimus 1 MG Cap  Commonly known as:  PROGRAF  Take 7 capsules (7 mg total) by mouth every 12 (twelve) hours.            Indwelling Lines/Drains at time of discharge:   Lines/Drains/Airways     Drain                  Hemodialysis AV Fistula Left forearm -- days                Time spent on the discharge of patient: 35 minutes  Patient was seen and examined on the date of discharge and determined to be suitable for discharge.    Critical care time spent on the evaluation and treatment of severe organ dysfunction, review of pertinent labs and imaging studies, discussions with consulting providers and discussions with patient/family: 35  minutes.     Aren Parada MD  Department of Hospital Medicine  [facility]

## 2018-06-23 NOTE — NURSING
Patient in bed AAOx4. No acute distress. Hypertensive on cardiac monitor. Dialysis nurse at bedside preparing patient for dialysis treatment. Cardene drip continuing to infuse from hypertension. Will continue to monitor.

## 2018-06-23 NOTE — PROGRESS NOTES
Follow-up Information     Roberto Wright NP On 6/25/2018.    Specialty:  Internal Medicine  Why:  Hospital Follow Up. Monday at 12:00pm.   Contact information:  Beck[address] LOAIZA  [facility] [address] [phone]               PLEASE BRING TO ALL FOLLOW UP APPOINTMENTS:   A COPY YOUR DISCHARGE INSTRUCTIONS, Any new MEDICINES YOU ARE CURRENTLY TAKING IN THEIR ORIGINAL BOTTLES  And IDENTIFICATION AND INSURANCE CARD     **PLEASE ARRIVE 15 MINUTES AHEAD OF SCHEDULED APPOINTMENT TIME   ++PLEASE CALL 24 HOURS IN ADVANCE IF YOU MUST RESCHEDULE YOUR APPOINTMENT DAY AND/OR TIME     After discharge for assistance [facility] On Call Nurse Care Line 24/7 Assistance [phone]     Things You are responsible For To Manage Your Care At Home:  1.    Getting your prescriptions filled   2.    Taking your medications as directed, DO NOT MISS ANY DOSES!  3.    Going to your follow-up doctor appointment. This is important because it  allow the doctor to monitor your progress and determine if  any changes need to made to your treatment plan.     Thank you for choosing [facility] for your care.  Please answer any calls you may receive from [facility] we want to continue to support you as you manage your healthcare needs. [facility] is happy to have the opportunity to serve you.      Again, Thank you for allowing me to help with your discharge planning and choosing [facility] as your healthcare provider.     LIU Coates, MSW, CSW  [phone]  Care Management

## 2018-06-23 NOTE — HOSPITAL COURSE
The patient is a known T,Th,Sat dialysis patient. She missed dialysis 2 days ago. Came in with a hypertensive urgency and sent to the ICU on a Nicardipine drip. Nephrology was consulted and the patient underwent emergent dialysis. Nicardipine was weaned to off. The patient's BP was well controlled and she had no other symptoms.  She will have dialysis today and then be discharged to home from the ICU. No new meds. Diet- low NA. Activity- as tolerated. Follow up with PCP in one week

## 2018-06-23 NOTE — HPI
"  Holly Patel is a 27 y.o. female that (in part)  has a past medical history of Anemia in ESRD (end-stage renal disease); Chronic rejection of liver transplant; Depression; Encounter for blood transfusion; ESRD on hemodialysis; History of recent hospitalization; History of splenomegaly; Immunosuppressed; Iron deficiency anemia secondary to inadequate dietary iron intake; Liver replaced by transplant; Moderate protein-calorie malnutrition; MRSA bacteremia; Pneumonia; Prophylactic immunotherapy; Renovascular hypertension; Secondary hyperparathyroidism; Seizures; Sialadenitis; Thrombocytopenia; and Thrombocytopenia.  has a past surgical history that includes Liver transplant (1992); Liver biopsy;  section; Tubal ligation (); conization of cervix using loop electrosurgical excision procedure (leep) (N/A, 6/15/2018); Examination under anesthesia (N/A, 2018); and Perineorrhaphy (2018).   Presents to [facility] from the [facility] for treatment of hypertensive emergency.   At the [facility] ED she had hypertensive emergency (up to 280/190). Patient reports missing dialysis treatment yesterday () due to feeling "unwell," left abdominal pain radiating to the left lower back, and facial swelling. Pain is described as sharp, previously constant but now intermittent, worse with movement, and not related to meals. Last bowel movement was earlier in the day, last meal at lunchtime. Patient also complains of generalized pruritis since this morning () that is not relieved by p.o. Benadryl.                Patient has been on hemodialysis for 4 years, with left AV fistula, and is anuric. She has had several hospital admissions for infections and hypertensive emergency in the past 6 months, of note 3/14/18 hypertensive emergency with seizures (takes home Keppra 500mg bid) and - hospital admission with sepsis 2/2 bacteremia/pneumonia (discharged " "on Cefazolin for 14d treatment of MSSA bacteremia). Patient had LEEP surgery of cervix one week ago (6/15) for (+) HPV "other high risk type" (not 16 or 18), and subsequent hospital admission on 6/19-6/20 for post-surgical vaginal bleeding and anemia.                Patient denies chest pain, SOB, N/V/D. Denies fever, chills, or night sweats. Reports weight loss of 30 lbs over past 2 months. Patient has chronic left-sided, periorbital headaches for which she takes home Tylenol; currently denies headache, lightheadedness, dizziness, diplopia, or neck stiffness.     Hospital medicine has been asked to admit for further evaluation and treatment.   "

## 2018-06-23 NOTE — PLAN OF CARE
06/23/18 1137   Final Note   Discharge Disposition Home   What phone number can be called within the next 1-3 days to see how you are doing after discharge? [phone]   Hospital Follow Up  Appt(s) scheduled? Yes   Discharge plans and expectations educations in teach back method with documentation complete? Yes   Discharge[facility]Hospital En[facility] Yes   Referral to / orders for Home Health Complete? Yes   Did you assess the readiness or willingness of the family or caregiver to support self management of care? Yes   Right Care Referral Info   Post Acute Recommendation No Care   [facility]    WRITTEN HEALTHCARE AND DISCHARGE INFORMATION     Pt. Verbalized and understanding with teach back regarding signs and symptoms          Help at Home           [phone]  After discharge for assistance [facility] On Call Nurse Care Line 24/7  Assistance     Things You are responsible For To Manage Your Care At Home:  1.    Getting your prescriptions filled   2.    Taking your medications as directed, DO NOT MISS ANY DOSES!  3.    Going to your follow-up doctor appointment. This is important because it  allow the doctor to monitor your progress and determine if  any changes need to made to your treatment plan.     Thank you for choosing [facility] for your care.  Please answer any calls you may receive from [facility] we want to continue to support you as you manage your healthcare needs. [facility] is happy to have the opportunity to serve you.      Sincerely,  Your [facility] Team,     Ivana White LMSW

## 2018-06-24 LAB — TACROLIMUS BLD-MCNC: <1.5 NG/ML

## 2018-06-24 NOTE — PROGRESS NOTES
Vital signs WNL. Discharge instructions and follow up care and future appointments reviewed and patient and mother both verbalize understanding.

## 2018-06-25 ENCOUNTER — TELEPHONE (OUTPATIENT)
Dept: OBSTETRICS AND GYNECOLOGY | Facility: CLINIC | Age: 28
End: 2018-06-25

## 2018-06-25 ENCOUNTER — TELEPHONE (OUTPATIENT)
Dept: ADMINISTRATIVE | Facility: CLINIC | Age: 28
End: 2018-06-25

## 2018-06-25 ENCOUNTER — PATIENT OUTREACH (OUTPATIENT)
Dept: ADMINISTRATIVE | Facility: CLINIC | Age: 28
End: 2018-06-25

## 2018-06-25 NOTE — PATIENT INSTRUCTIONS
Home Health Recert 04/22/2018 to 06/20/2018 [facility] Dr Aren Hale ,  services.

## 2018-06-25 NOTE — Clinical Note
Please forward this important TCC information to your provider in order to maximize the post discharge care delivery of this patient.  C3 nurse spoke with Holly Patel  for a TCC post hospital discharge follow up call. The patient has a scheduled HOSFU appointment with TEO EATON NP on 7/11 @ 12noon .  Respectfully, Pippa Doty RN  [facility] C3   [email or web address]     Please do not reply to this message, as this inbox is not routinely monitored.

## 2018-06-25 NOTE — TELEPHONE ENCOUNTER
"Advised pt on yesterday to go to [facility] ED to be evaluated.   Call to get update on pt today. Pt did not answer. Contacted her mother (Myrna), who stated that she did not go to the Er because the " bleeding has slacked up." Pt's mom reports that Holly was checked out by her sister who thinks that it is just her menstrual. Pt's mom was advised to bring Holly to ED if bleeding became heavier. Ms. Norris verbalized understanding.   ----- Message from Vilma Ortiz sent at 6/25/2018  1:51 PM CDT -----  Contact: self  Pt states she is still bleeding from her surgery ( 06/15/18), she can be reached at [phone].    "

## 2018-06-25 NOTE — PATIENT INSTRUCTIONS
Discharge Instructions for High Blood Pressure (Hypertension)    You have been diagnosed with high blood pressure (also called hypertension). This means the force of blood against your artery walls is too strong. It also means your heart is working hard to move blood. High blood pressure usually has no symptoms, but over time, it can damage your heart, blood vessels, eyes, kidneys, and other organs. With help from your doctor, you can manage your blood pressure and protect your health.    Taking Medications    Learn to take your own blood pressure. Keep a record of your results. Ask your doctor which readings mean that you need medical attention.  Take your blood pressure medication exactly as directed. Dont skip doses. Missing doses can cause your blood pressure to get out of control.  Avoid medications that contain heart stimulants, including over-the-counter drugs. Check for warnings about high blood pressure on the label.  Check with your doctor before taking a decongestant. Some decongestants can worsen high blood pressure.    Lifestyle Changes    Maintain a healthy weight. Get help to lose any extra pounds.  Cut back on salt.  Limit canned, dried, packaged, and fast foods.  Dont add salt to your food at the table.  Season foods with herbs instead of salt when you cook.  Follow the DASH (Dietary Approaches to Stop Hypertension) eating plan. This plan recommends vegetables, fruits, whole gains, and other heart healthy foods.  Begin an exercise program. Ask your doctor how to get started. You can benefit from simple activities like walking or gardening.  Break the smoking habit. Enroll in a stop-smoking program to improve your chances of success. Ask your health care provider about programs and medications to help you stop smoking.  Limit drinks that contain caffeine (coffee, black or green tea, cola) to 2 per day.  Never take stimulants such as amphetamines or cocaine; these drugs can be deadly for someone  with high blood pressure.  Control your stress. Learn stress-management techniques.  Limit alcohol to no more than 1 drink a day for women and 2 drinks a day for men.    Follow-Up  Make a follow-up appointment as directed by our staff.    When to Call Your Doctor  Call your doctor immediately if you have any of the following:  Chest pain or shortness of breath (call 911)  Moderate to severe headache  Weakness in the muscles of your face, arms, or legs  Trouble speaking  Extreme drowsiness  Confusion  Fainting or dizziness  Pulsating or rushing sound in your ears  Unexplained nosebleed  Weakness, tingling, or numbness of your face, arms, or legs  Change in vision  Blood pressure measured at home that is greater than 180/110    © 4482-3023 [facility], [address] [place] [address], [place] [address]. All rights reserved. This information is not intended as a substitute for professional medical care. Always follow your healthcare professional's instructions.

## 2018-06-25 NOTE — CONSULTS
"DATE OF CONSULTATION:  2018.    REQUESTING CONSULT:  Dr. Barajas.    REASON FOR CONSULTATION:  Hypertensive emergency.    HISTORY OF PRESENT ILLNESS:  This is a 27-year-old female who was seen in the   past.  She has a history of end-stage renal disease.  She is dialyzed by Dr. Bass Monday, Wednesday, and Friday.  She subsequently missed dialysis on   Thursday because she "was not feeling well."  On evaluation, she was noted to be   hypertensive, concerning for abdominal swelling and pain and was subsequently   put on Cardia in the ICU.  Her blood pressure has been better on the Cardene.    Her antihypertensives were resumed.  She has a history of liver transplant;   however, she is not very compliant with taking her medications.  Multiple notes   noted by the liver transplant with poor adherence to her medication with   undetectable Prograf levels.  Prograf level is currently pending.  Initial blood   pressure was 222/114.  She recently was also noted to have a LEEP done at   [facility] that was complicated by postop bleeding.  She also recently was   discharged from [facility] with concerns of MSSA sepsis.    PAST MEDICAL HISTORY:  Anemia with end-stage renal disease, chronic, history  of   liver transplant, medications, depression, history of pneumonia, splenomegaly,   history of pancytopenia, anemia, hemangioendothelioma, liver transplant ,   MRSA bacteremia, secondary hyperparathyroidism, thrombocytopenia, seizure   disorder.    PAST SURGICAL HISTORY:  LEEP, , post-LEEP bleeding, liver biopsy, liver   transplant, tubal ligation, AV fistula.    FAMILY HISTORY:  Significant for father with hypertension.  Mom with   hypertension.    SOCIAL HISTORY:  She is .  She does not smoke.    REVIEW OF SYSTEMS:  A 10-point review of systems pertinent for weight loss, back   pain, abdominal pain, vaginal bleeding.    CURRENT MEDICATIONS:  Aspirin, Sensipar, Celexa, clonidine patch, " hydralazine,   labetalol p.o., Singulair, CellCept, prednisone, Prograf.    PHYSICAL EXAMINATION:  VITAL SIGNS:  Temperature 98.7, heart rate 93, respirations 39, blood pressure   149/70.  GENERAL:  Well-developed female who is ill-appearing.  HEENT:  Normocephalic.  Extraocular muscles intact.  Moist mucous membranes.  NECK:  Supple.  CARDIOVASCULAR SYSTEM:  S1, S2, regular.  PULMONARY:  Clear to auscultation.  ABDOMEN:  Positive bowel sounds, soft, nondistended.  EXTREMITIES:  Shows no edema.  AV fistula in the upper extremity.    LABORATORY DATA:  White count 5.3, H and H 8 and 23.6, platelet count 97.  INR   1.  Sodium 138, potassium 4.7, bicarbonate 25, BUN and creatinine 48 and 9.2,   calcium is 9.2, albumin is 2.8.  Chest x-ray - superimposed parapneumonic   effusion on right lobe.    IMAGING DATA:  A CT scan shows small left pleural effusions, small to moderate   right-sided effusion, low dense material  surface of the upper vagina.    ASSESSMENT AND PLAN:  1.  End-stage renal disease. , hemodialyzed emergently last night.  We will   resume dialysis again today.  2.  Hypertension.  Wean off Cardene, continue antihypertensive after dialysis   today.  3.  Anemia with chronic kidney disease.  We will give Epogen if her blood   pressure can tolerate it.  4.  Liver transplant.  She needs to take her medicines, Prograf level pending,   noncompliant with her medications.  5.  Thrombocytopenia.  We will watch for further vaginal bleeding.  No heparin   with dialysis.  6.  Pneumonia recently treated, concerning with a possible parapneumonic   effusion with a recent pneumonia.  We will consult Pulmonary and see what they   think about a CT scan or chest x-ray.  7.  Secondary hyperparathyroidism.  Continue the medications.            /john [id number] marques(s)        JAYE/IN  dd: 06/23/2018 07:44:46 (CDT)  td: 06/23/2018 14:10:34 (CDT)  Doc ID   #[id number]  Job ID #[id number]    CC:

## 2018-06-25 NOTE — PHYSICIAN QUERY
PT Name: Holly Patel  MR #: [id number]    Physician Query Form - Cause and Effect Relationship Clarification      CDS/: AILYN King,RNC-MNN          Contact information:[email or web address]    This form is a permanent document in the medical record.     Query Date: 2018    By submitting this query, we are merely seeking further clarification of documentation. Please utilize your independent clinical judgment when addressing the question(s) below.    The Medical record contains the following:  Supporting Clinical Findings   Location in record   Chief Complaint: Vaginal Bleeding                                                                           27 year old  with numerous co-morbidities including ESRD - Hemodialysis dependent, s/p liver transplant (, on chronic immune suppression), Anemia and and thrombocytopenia, seizure disorder and depression.  The patient underwent an uncomplicated LEEP procedure on 6/15/2018. She was subsequently discharged without complications.  She stated that she had light vaginal bleeding the night of the procedure but she noticed significant the day following the surgery.  Since that time she has experienced persistent bleeding, saturating more than a pad per hour on multiple occasions over the past few days.  She then contacted her primary OBGYN today who suggested that she come to the ED for evaluation.\    In the ED the patient was found to be in no acute distress but with noted significant vaginal bleeding.  Her CBC was grossly abnormal with a WBC of 1.5 and an H/H of 5.6/17.6 and platelets of 54.  On pelvic exam the patient had noted persistent bleeding from the cervix which was not controlled by pressure or Monsels in the ED.  Two units of PRBCs were ordered in the ED and the decision was made to do an exam under anesthesia for possible surgical management of the persistent vaginal bleeding.            [facility]  Course:  06/19/2018 - Patient to ED for persistent vaginal bleeding after LEEP procedure. Unsuccessful management of bleeding in ED.  Pancytopenic on admission. 2 units of PRBCs ordered.  Patient to OR surgical management of bleeding s/p LEEP  06/20/2018 - POD # 1 s/p EUA/Suture Ligation of Cervix.  Patient doing well post-operatively.  Walking and voiding.  Has not eaten.  Denies pain, fever or chills. Has only had vaginal spotting overnight.  Appropriate rise in H/H. Surgically stable once tolerating diet.                              H&P 6/19                                                          Discharge Summaries 6/21                                                                                                                                                                                                       Provider, please clarify if there is any correlation between Vaginal Bleeding and uncomplicated LEEP procedure on 6/15/2018 .           Are the conditions:     [  ] Due to or associated with each other     [  ] Unrelated to each other     [  x] Other (Please Specify): ______bleeding at site of LEEP___________________     [  ] Clinically Undetermined

## 2018-06-26 RX ORDER — CITALOPRAM 20 MG/1
TABLET, FILM COATED ORAL
Qty: 30 TABLET | Refills: 1 | Status: SHIPPED | OUTPATIENT
Start: 2018-06-26 | End: 2019-01-01

## 2018-06-27 ENCOUNTER — HOSPITAL ENCOUNTER (INPATIENT)
Facility: OTHER | Age: 28
LOS: 1 days | Discharge: HOME | DRG: 304 | End: 2018-06-29
Attending: EMERGENCY MEDICINE | Admitting: EMERGENCY MEDICINE
Payer: MEDICARE

## 2018-06-27 DIAGNOSIS — Z99.2 ESRD (END STAGE RENAL DISEASE) ON DIALYSIS: ICD-10-CM

## 2018-06-27 DIAGNOSIS — N18.6 ESRD (END STAGE RENAL DISEASE) ON DIALYSIS: ICD-10-CM

## 2018-06-27 DIAGNOSIS — Z34.90 PREGNANCY: ICD-10-CM

## 2018-06-27 DIAGNOSIS — I16.1 HYPERTENSIVE EMERGENCY: Primary | ICD-10-CM

## 2018-06-27 DIAGNOSIS — Z94.4 LIVER REPLACED BY TRANSPLANT: Chronic | ICD-10-CM

## 2018-06-27 DIAGNOSIS — K92.0 HEMATEMESIS WITH NAUSEA: ICD-10-CM

## 2018-06-27 DIAGNOSIS — N93.9 VAGINA BLEEDING: ICD-10-CM

## 2018-06-27 LAB
ABO + RH BLD: NORMAL
ALBUMIN SERPL BCP-MCNC: 3 G/DL
ALP SERPL-CCNC: 606 U/L
ALT SERPL W/O P-5'-P-CCNC: 29 U/L
ANION GAP SERPL CALC-SCNC: 11 MMOL/L
AST SERPL-CCNC: 58 U/L
BASOPHILS # BLD AUTO: 0 K/UL
BASOPHILS NFR BLD: 0 %
BILIRUB SERPL-MCNC: 0.6 MG/DL
BLD GP AB SCN CELLS X3 SERPL QL: NORMAL
BUN SERPL-MCNC: 29 MG/DL
CALCIUM SERPL-MCNC: 9.5 MG/DL
CHLORIDE SERPL-SCNC: 97 MMOL/L
CO2 SERPL-SCNC: 32 MMOL/L
CREAT SERPL-MCNC: 6.1 MG/DL
DIFFERENTIAL METHOD: ABNORMAL
EOSINOPHIL # BLD AUTO: 0.3 K/UL
EOSINOPHIL NFR BLD: 8.2 %
ERYTHROCYTE [DISTWIDTH] IN BLOOD BY AUTOMATED COUNT: 16.9 %
EST. GFR  (AFRICAN AMERICAN): 10 ML/MIN/1.73 M^2
EST. GFR  (NON AFRICAN AMERICAN): 9 ML/MIN/1.73 M^2
GLUCOSE SERPL-MCNC: 127 MG/DL
HCT VFR BLD AUTO: 24.5 %
HGB BLD-MCNC: 8 G/DL
LYMPHOCYTES # BLD AUTO: 0.5 K/UL
LYMPHOCYTES NFR BLD: 15.4 %
MCH RBC QN AUTO: 27.6 PG
MCHC RBC AUTO-ENTMCNC: 32.7 G/DL
MCV RBC AUTO: 85 FL
MONOCYTES # BLD AUTO: 0.1 K/UL
MONOCYTES NFR BLD: 3.9 %
NEUTROPHILS # BLD AUTO: 2.2 K/UL
NEUTROPHILS NFR BLD: 72.5 %
PLATELET # BLD AUTO: 70 K/UL
PMV BLD AUTO: 8.6 FL
POTASSIUM SERPL-SCNC: 3.9 MMOL/L
PROT SERPL-MCNC: 8.1 G/DL
RBC # BLD AUTO: 2.9 M/UL
SODIUM SERPL-SCNC: 140 MMOL/L
WBC # BLD AUTO: 3.05 K/UL

## 2018-06-27 PROCEDURE — 80053 COMPREHEN METABOLIC PANEL: CPT

## 2018-06-27 PROCEDURE — 96372 THER/PROPH/DIAG INJ SC/IM: CPT | Mod: 59

## 2018-06-27 PROCEDURE — 96374 THER/PROPH/DIAG INJ IV PUSH: CPT

## 2018-06-27 PROCEDURE — 25000003 PHARM REV CODE 250: Performed by: PHYSICIAN ASSISTANT

## 2018-06-27 PROCEDURE — 96376 TX/PRO/DX INJ SAME DRUG ADON: CPT

## 2018-06-27 PROCEDURE — G0378 HOSPITAL OBSERVATION PER HR: HCPCS

## 2018-06-27 PROCEDURE — 86901 BLOOD TYPING SEROLOGIC RH(D): CPT

## 2018-06-27 PROCEDURE — 85025 COMPLETE CBC W/AUTO DIFF WBC: CPT

## 2018-06-27 PROCEDURE — 63600175 PHARM REV CODE 636 W HCPCS: Performed by: EMERGENCY MEDICINE

## 2018-06-27 PROCEDURE — 99285 EMERGENCY DEPT VISIT HI MDM: CPT | Mod: 25

## 2018-06-27 PROCEDURE — 96375 TX/PRO/DX INJ NEW DRUG ADDON: CPT

## 2018-06-27 PROCEDURE — 25000003 PHARM REV CODE 250: Performed by: EMERGENCY MEDICINE

## 2018-06-27 RX ORDER — NICARDIPINE HYDROCHLORIDE 0.2 MG/ML
5 INJECTION INTRAVENOUS CONTINUOUS
Status: DISCONTINUED | OUTPATIENT
Start: 2018-06-27 | End: 2018-06-28

## 2018-06-27 RX ORDER — SILVER NITRATE 38.21; 12.74 MG/1; MG/1
2 STICK TOPICAL ONCE
Status: DISCONTINUED | OUTPATIENT
Start: 2018-06-28 | End: 2018-06-27 | Stop reason: CLARIF

## 2018-06-27 RX ORDER — SILVER NITRATE 38.21; 12.74 MG/1; MG/1
2 STICK TOPICAL
Status: COMPLETED | OUTPATIENT
Start: 2018-06-28 | End: 2018-06-28

## 2018-06-27 RX ORDER — CLONIDINE HYDROCHLORIDE 0.1 MG/1
0.3 TABLET ORAL
Status: COMPLETED | OUTPATIENT
Start: 2018-06-27 | End: 2018-06-27

## 2018-06-27 RX ORDER — HYDRALAZINE HYDROCHLORIDE 20 MG/ML
20 INJECTION INTRAMUSCULAR; INTRAVENOUS
Status: COMPLETED | OUTPATIENT
Start: 2018-06-27 | End: 2018-06-27

## 2018-06-27 RX ORDER — SILVER NITRATE 38.21; 12.74 MG/1; MG/1
2 STICK TOPICAL ONCE
Status: DISCONTINUED | OUTPATIENT
Start: 2018-06-28 | End: 2018-06-27

## 2018-06-27 RX ADMIN — FERRIC SUBSULFATE 8 ML: 259 SOLUTION TOPICAL at 09:06

## 2018-06-27 RX ADMIN — HYDRALAZINE HYDROCHLORIDE 20 MG: 20 INJECTION INTRAMUSCULAR; INTRAVENOUS at 08:06

## 2018-06-27 RX ADMIN — CLONIDINE HYDROCHLORIDE 0.3 MG: 0.1 TABLET ORAL at 08:06

## 2018-06-28 ENCOUNTER — TELEPHONE (OUTPATIENT)
Dept: TRANSPLANT | Facility: CLINIC | Age: 28
End: 2018-06-28

## 2018-06-28 LAB
ANION GAP SERPL CALC-SCNC: 10 MMOL/L
BASOPHILS # BLD AUTO: 0.01 K/UL
BASOPHILS NFR BLD: 0.3 %
BUN SERPL-MCNC: 32 MG/DL
CALCIUM SERPL-MCNC: 9.2 MG/DL
CHLORIDE SERPL-SCNC: 100 MMOL/L
CO2 SERPL-SCNC: 31 MMOL/L
CREAT SERPL-MCNC: 6.7 MG/DL
DIFFERENTIAL METHOD: ABNORMAL
EOSINOPHIL # BLD AUTO: 0.3 K/UL
EOSINOPHIL NFR BLD: 11.4 %
ERYTHROCYTE [DISTWIDTH] IN BLOOD BY AUTOMATED COUNT: 17.1 %
EST. GFR  (AFRICAN AMERICAN): 9 ML/MIN/1.73 M^2
EST. GFR  (NON AFRICAN AMERICAN): 8 ML/MIN/1.73 M^2
ESTIMATED AVG GLUCOSE: 94 MG/DL
GLUCOSE SERPL-MCNC: 103 MG/DL
HBA1C MFR BLD HPLC: 4.9 %
HCG INTACT+B SERPL-ACNC: 1.9 MIU/ML
HCT VFR BLD AUTO: 22.3 %
HCT VFR BLD AUTO: 22.4 %
HCT VFR BLD AUTO: 22.4 %
HCT VFR BLD AUTO: 22.7 %
HGB BLD-MCNC: 7.2 G/DL
HGB BLD-MCNC: 7.2 G/DL
HGB BLD-MCNC: 7.3 G/DL
HGB BLD-MCNC: 7.6 G/DL
LYMPHOCYTES # BLD AUTO: 0.6 K/UL
LYMPHOCYTES NFR BLD: 18.5 %
MAGNESIUM SERPL-MCNC: 2.3 MG/DL
MCH RBC QN AUTO: 27.1 PG
MCHC RBC AUTO-ENTMCNC: 32.1 G/DL
MCV RBC AUTO: 84 FL
MONOCYTES # BLD AUTO: 0.1 K/UL
MONOCYTES NFR BLD: 4.7 %
NEUTROPHILS # BLD AUTO: 1.9 K/UL
NEUTROPHILS NFR BLD: 65.1 %
PHOSPHATE SERPL-MCNC: 5.3 MG/DL
PLATELET # BLD AUTO: 63 K/UL
PMV BLD AUTO: 8.9 FL
POTASSIUM SERPL-SCNC: 4.1 MMOL/L
PTH-INTACT SERPL-MCNC: 1771.8 PG/ML
RBC # BLD AUTO: 2.66 M/UL
SODIUM SERPL-SCNC: 141 MMOL/L
TACROLIMUS BLD-MCNC: <1.5 NG/ML
WBC # BLD AUTO: 2.98 K/UL

## 2018-06-28 PROCEDURE — 11000001 HC ACUTE MED/SURG PRIVATE ROOM

## 2018-06-28 PROCEDURE — 80197 ASSAY OF TACROLIMUS: CPT

## 2018-06-28 PROCEDURE — 63600175 PHARM REV CODE 636 W HCPCS: Performed by: PHYSICIAN ASSISTANT

## 2018-06-28 PROCEDURE — 25000003 PHARM REV CODE 250: Performed by: PHYSICIAN ASSISTANT

## 2018-06-28 PROCEDURE — 83036 HEMOGLOBIN GLYCOSYLATED A1C: CPT

## 2018-06-28 PROCEDURE — 94761 N-INVAS EAR/PLS OXIMETRY MLT: CPT

## 2018-06-28 PROCEDURE — 36415 COLL VENOUS BLD VENIPUNCTURE: CPT

## 2018-06-28 PROCEDURE — 25000003 PHARM REV CODE 250: Performed by: INTERNAL MEDICINE

## 2018-06-28 PROCEDURE — 63600175 PHARM REV CODE 636 W HCPCS: Performed by: NURSE PRACTITIONER

## 2018-06-28 PROCEDURE — 85025 COMPLETE CBC W/AUTO DIFF WBC: CPT

## 2018-06-28 PROCEDURE — 25000003 PHARM REV CODE 250: Performed by: STUDENT IN AN ORGANIZED HEALTH CARE EDUCATION/TRAINING PROGRAM

## 2018-06-28 PROCEDURE — 85018 HEMOGLOBIN: CPT | Mod: 91

## 2018-06-28 PROCEDURE — 83735 ASSAY OF MAGNESIUM: CPT

## 2018-06-28 PROCEDURE — 80048 BASIC METABOLIC PNL TOTAL CA: CPT

## 2018-06-28 PROCEDURE — 84702 CHORIONIC GONADOTROPIN TEST: CPT

## 2018-06-28 PROCEDURE — 85014 HEMATOCRIT: CPT | Mod: 91

## 2018-06-28 PROCEDURE — 2Y44X5Z PACKING OF FEMALE GENITAL TRACT USING PACKING MATERIAL: ICD-10-PCS | Performed by: OBSTETRICS & GYNECOLOGY

## 2018-06-28 PROCEDURE — 84100 ASSAY OF PHOSPHORUS: CPT

## 2018-06-28 PROCEDURE — 99223 1ST HOSP IP/OBS HIGH 75: CPT | Mod: ,,, | Performed by: INTERNAL MEDICINE

## 2018-06-28 PROCEDURE — 85018 HEMOGLOBIN: CPT

## 2018-06-28 PROCEDURE — 25000003 PHARM REV CODE 250: Performed by: NURSE PRACTITIONER

## 2018-06-28 PROCEDURE — 80100016 HC MAINTENANCE HEMODIALYSIS

## 2018-06-28 PROCEDURE — 83970 ASSAY OF PARATHORMONE: CPT

## 2018-06-28 PROCEDURE — 85014 HEMATOCRIT: CPT

## 2018-06-28 RX ORDER — ONDANSETRON 2 MG/ML
4 INJECTION INTRAMUSCULAR; INTRAVENOUS
Status: COMPLETED | OUTPATIENT
Start: 2018-06-28 | End: 2018-06-28

## 2018-06-28 RX ORDER — CLONIDINE HYDROCHLORIDE 0.1 MG/1
0.3 TABLET ORAL ONCE
Status: COMPLETED | OUTPATIENT
Start: 2018-06-28 | End: 2018-06-28

## 2018-06-28 RX ORDER — LISINOPRIL 20 MG/1
40 TABLET ORAL DAILY
Status: DISCONTINUED | OUTPATIENT
Start: 2018-06-28 | End: 2018-06-29 | Stop reason: HOSPADM

## 2018-06-28 RX ORDER — MYCOPHENOLATE MOFETIL 250 MG/1
1000 CAPSULE ORAL 2 TIMES DAILY
Status: DISCONTINUED | OUTPATIENT
Start: 2018-06-28 | End: 2018-06-29 | Stop reason: HOSPADM

## 2018-06-28 RX ORDER — SODIUM CHLORIDE 9 MG/ML
INJECTION, SOLUTION INTRAVENOUS ONCE
Status: COMPLETED | OUTPATIENT
Start: 2018-06-28 | End: 2018-06-28

## 2018-06-28 RX ORDER — OXYCODONE AND ACETAMINOPHEN 10; 325 MG/1; MG/1
1 TABLET ORAL EVERY 4 HOURS PRN
Status: DISCONTINUED | OUTPATIENT
Start: 2018-06-28 | End: 2018-06-29 | Stop reason: HOSPADM

## 2018-06-28 RX ORDER — CITALOPRAM 20 MG/1
20 TABLET, FILM COATED ORAL DAILY
Status: DISCONTINUED | OUTPATIENT
Start: 2018-06-28 | End: 2018-06-29 | Stop reason: HOSPADM

## 2018-06-28 RX ORDER — HYDRALAZINE HYDROCHLORIDE 25 MG/1
100 TABLET, FILM COATED ORAL 2 TIMES DAILY
Status: DISCONTINUED | OUTPATIENT
Start: 2018-06-28 | End: 2018-06-29

## 2018-06-28 RX ORDER — CINACALCET 30 MG/1
30 TABLET, FILM COATED ORAL
Status: DISCONTINUED | OUTPATIENT
Start: 2018-06-28 | End: 2018-06-29

## 2018-06-28 RX ORDER — TACROLIMUS 1 MG/1
7 CAPSULE ORAL 2 TIMES DAILY
Status: DISCONTINUED | OUTPATIENT
Start: 2018-06-28 | End: 2018-06-29 | Stop reason: HOSPADM

## 2018-06-28 RX ORDER — FAMOTIDINE 20 MG/1
40 TABLET, FILM COATED ORAL DAILY
Status: DISCONTINUED | OUTPATIENT
Start: 2018-06-28 | End: 2018-06-29

## 2018-06-28 RX ORDER — PREDNISONE 1 MG/1
1 TABLET ORAL EVERY OTHER DAY
Status: DISCONTINUED | OUTPATIENT
Start: 2018-06-28 | End: 2018-06-29 | Stop reason: HOSPADM

## 2018-06-28 RX ORDER — HEPARIN SODIUM 5000 [USP'U]/ML
5000 INJECTION, SOLUTION INTRAVENOUS; SUBCUTANEOUS EVERY 8 HOURS
Status: DISCONTINUED | OUTPATIENT
Start: 2018-06-28 | End: 2018-06-29

## 2018-06-28 RX ORDER — SODIUM CHLORIDE 0.9 % (FLUSH) 0.9 %
5 SYRINGE (ML) INJECTION
Status: DISCONTINUED | OUTPATIENT
Start: 2018-06-28 | End: 2018-06-29 | Stop reason: HOSPADM

## 2018-06-28 RX ORDER — ONDANSETRON 8 MG/1
8 TABLET, ORALLY DISINTEGRATING ORAL EVERY 8 HOURS PRN
Status: DISCONTINUED | OUTPATIENT
Start: 2018-06-28 | End: 2018-06-29 | Stop reason: HOSPADM

## 2018-06-28 RX ORDER — LABETALOL 200 MG/1
400 TABLET, FILM COATED ORAL EVERY 12 HOURS
Status: DISCONTINUED | OUTPATIENT
Start: 2018-06-28 | End: 2018-06-29 | Stop reason: HOSPADM

## 2018-06-28 RX ORDER — OXYCODONE AND ACETAMINOPHEN 5; 325 MG/1; MG/1
1 TABLET ORAL EVERY 4 HOURS PRN
Status: DISCONTINUED | OUTPATIENT
Start: 2018-06-28 | End: 2018-06-29 | Stop reason: HOSPADM

## 2018-06-28 RX ORDER — NIFEDIPINE 30 MG/1
90 TABLET, EXTENDED RELEASE ORAL DAILY
Status: DISCONTINUED | OUTPATIENT
Start: 2018-06-28 | End: 2018-06-29 | Stop reason: HOSPADM

## 2018-06-28 RX ORDER — HYDRALAZINE HYDROCHLORIDE 20 MG/ML
20 INJECTION INTRAMUSCULAR; INTRAVENOUS ONCE
Status: COMPLETED | OUTPATIENT
Start: 2018-06-28 | End: 2018-06-28

## 2018-06-28 RX ORDER — CLONIDINE 0.3 MG/24H
1 PATCH, EXTENDED RELEASE TRANSDERMAL
Status: DISCONTINUED | OUTPATIENT
Start: 2018-06-28 | End: 2018-06-29 | Stop reason: HOSPADM

## 2018-06-28 RX ORDER — MORPHINE SULFATE 4 MG/ML
4 INJECTION, SOLUTION INTRAMUSCULAR; INTRAVENOUS
Status: COMPLETED | OUTPATIENT
Start: 2018-06-28 | End: 2018-06-28

## 2018-06-28 RX ORDER — HYDRALAZINE HYDROCHLORIDE 25 MG/1
50 TABLET, FILM COATED ORAL 2 TIMES DAILY
Status: DISCONTINUED | OUTPATIENT
Start: 2018-06-28 | End: 2018-06-28

## 2018-06-28 RX ADMIN — MYCOPHENOLATE MOFETIL 1000 MG: 250 CAPSULE ORAL at 08:06

## 2018-06-28 RX ADMIN — CLONIDINE 1 PATCH: 0.3 PATCH TRANSDERMAL at 08:06

## 2018-06-28 RX ADMIN — FERRIC SUBSULFATE 8 ML: 259 SOLUTION TOPICAL at 12:06

## 2018-06-28 RX ADMIN — HYDRALAZINE HYDROCHLORIDE 50 MG: 25 TABLET, FILM COATED ORAL at 08:06

## 2018-06-28 RX ADMIN — CLONIDINE HYDROCHLORIDE 0.3 MG: 0.1 TABLET ORAL at 08:06

## 2018-06-28 RX ADMIN — LISINOPRIL 40 MG: 10 TABLET ORAL at 08:06

## 2018-06-28 RX ADMIN — SILVER NITRATE APPLICATORS 2 APPLICATOR: 25; 75 STICK TOPICAL at 12:06

## 2018-06-28 RX ADMIN — FAMOTIDINE 40 MG: 20 TABLET ORAL at 08:06

## 2018-06-28 RX ADMIN — HEPARIN SODIUM 5000 UNITS: 5000 INJECTION, SOLUTION INTRAVENOUS; SUBCUTANEOUS at 05:06

## 2018-06-28 RX ADMIN — LABETALOL HYDROCHLORIDE 400 MG: 200 TABLET, FILM COATED ORAL at 08:06

## 2018-06-28 RX ADMIN — TACROLIMUS 7 MG: 1 CAPSULE ORAL at 08:06

## 2018-06-28 RX ADMIN — MORPHINE SULFATE 4 MG: 4 INJECTION INTRAVENOUS at 12:06

## 2018-06-28 RX ADMIN — ONDANSETRON 4 MG: 2 INJECTION, SOLUTION INTRAMUSCULAR; INTRAVENOUS at 12:06

## 2018-06-28 RX ADMIN — HYDRALAZINE HYDROCHLORIDE 20 MG: 20 INJECTION INTRAMUSCULAR; INTRAVENOUS at 08:06

## 2018-06-28 RX ADMIN — CITALOPRAM HYDROBROMIDE 20 MG: 20 TABLET ORAL at 08:06

## 2018-06-28 RX ADMIN — NIFEDIPINE 90 MG: 30 TABLET, FILM COATED, EXTENDED RELEASE ORAL at 08:06

## 2018-06-28 RX ADMIN — HYDRALAZINE HYDROCHLORIDE 100 MG: 25 TABLET, FILM COATED ORAL at 08:06

## 2018-06-28 RX ADMIN — OXYCODONE HYDROCHLORIDE AND ACETAMINOPHEN 1 TABLET: 10; 325 TABLET ORAL at 08:06

## 2018-06-28 RX ADMIN — Medication 1 CAPSULE: at 10:06

## 2018-06-28 RX ADMIN — SODIUM CHLORIDE: 0.9 INJECTION, SOLUTION INTRAVENOUS at 10:06

## 2018-06-28 RX ADMIN — HEPARIN SODIUM 5000 UNITS: 5000 INJECTION, SOLUTION INTRAVENOUS; SUBCUTANEOUS at 10:06

## 2018-06-28 NOTE — SUBJECTIVE & OBJECTIVE
Interval History: Patient is without complaint at this time.  She denies fevers/chills or signs or symptoms of anemia.     Scheduled Meds:   cinacalcet  30 mg Oral Daily with breakfast    citalopram  20 mg Oral Daily    cloNIDine 0.3 mg/24 hr td ptwk  1 patch Transdermal Q7 Days    famotidine  40 mg Oral Daily    heparin (porcine)  5,000 Units Subcutaneous Q8H    hydrALAZINE  50 mg Oral BID    labetalol  400 mg Oral Q12H    mycophenolate  1,000 mg Oral BID    predniSONE  1 mg Oral Every other day    tacrolimus  7 mg Oral BID     Continuous Infusions:   niCARdipine Stopped (06/27/18 2220)     PRN Meds:lorazepam, ondansetron, sodium chloride 0.9%    Review of patient's allergies indicates:   Allergen Reactions    Chloral hydrate      Other reaction(s): Hallucinations  Other reaction(s): Hives    Hydrocodone Other (See Comments)     Mental status changes       Objective:     Vital Signs (Most Recent):  Temp: 98.8 °F (37.1 °C) (06/28/18 0156)  Pulse: 86 (06/28/18 0708)  Resp: 18 (06/28/18 0500)  BP: (!) 188/124 (06/28/18 0708)  SpO2: 100 % (06/28/18 0708) Vital Signs (24h Range):  Temp:  [97.7 °F (36.5 °C)-98.8 °F (37.1 °C)] 98.8 °F (37.1 °C)  Pulse:  [] 86  Resp:  [15-20] 18  SpO2:  [98 %-100 %] 100 %  BP: (150-269)/() 188/124     Weight: 53.1 kg (117 lb 1 oz)  Body mass index is 19.48 kg/m².  Patient's last menstrual period was 05/30/2018 (exact date).    I&O (Last 24H):  No intake or output data in the 24 hours ending 06/28/18 0815    Physical Exam:   Constitutional: She is oriented to person, place, and time. She appears well-developed and well-nourished. No distress.   Patient appears to be in no acute distress.     HENT:   Head: Normocephalic and atraumatic.    Eyes: Conjunctivae are normal.    Neck: Neck supple.    Cardiovascular: Normal rate.     Pulmonary/Chest: Effort normal. No respiratory distress.        Abdominal: Soft. She exhibits no distension. There is no tenderness. There is  no rebound and no guarding.     Genitourinary: Vagina normal.   Genitourinary Comments: Vaginal packing in place. No evidence of saturated packing. Packing at introitus is dry and without noted bleeding.                Neurological: She is alert and oriented to person, place, and time.    Skin: Skin is warm and dry. She is not diaphoretic.    Psychiatric: She has a normal mood and affect. Her behavior is normal. Judgment and thought content normal.       Laboratory:    Recent Results (from the past 336 hour(s))   CBC with Automated Differential    Collection Time: 06/28/18  5:35 AM   Result Value Ref Range    WBC 2.98 (L) 3.90 - 12.70 K/uL    Hemoglobin 7.2 (L) 12.0 - 16.0 g/dL    Hematocrit 22.4 (L) 37.0 - 48.5 %    Platelets 63 (L) 150 - 350 K/uL   CBC auto differential    Collection Time: 06/27/18  8:40 PM   Result Value Ref Range    WBC 3.05 (L) 3.90 - 12.70 K/uL    Hemoglobin 8.0 (L) 12.0 - 16.0 g/dL    Hematocrit 24.5 (L) 37.0 - 48.5 %    Platelets 70 (L) 150 - 350 K/uL   CBC auto differential    Collection Time: 06/22/18  5:56 PM   Result Value Ref Range    WBC 5.38 3.90 - 12.70 K/uL    Hemoglobin 8.0 (L) 12.0 - 16.0 g/dL    Hematocrit 23.6 (L) 37.0 - 48.5 %    Platelets 97 (L) 150 - 350 K/uL       I have personallly reviewed all pertinent lab results from the last 24 hours.

## 2018-06-28 NOTE — ED NOTES
No change in patient status. Pt sleeping in stretcher in NAD with even, unlabored respirations. VSS remain stable, pt on cardiac monitor and BP cycling. Will reassess pt's pain status and vaginal bleeding when she awakes.

## 2018-06-28 NOTE — PLAN OF CARE
06/28/18 1643   Discharge Assessment   Assessment Type Discharge Planning Assessment   Confirmed/corrected address and phone number on facesheet? Yes   Assessment information obtained from? Patient;Caregiver   Prior to hospitilization cognitive status: Alert/Oriented   Prior to hospitalization functional status: Independent   Current cognitive status: Alert/Oriented   Current Functional Status: Independent   Able to Return to Prior Arrangements yes   Is patient able to care for self after discharge? Yes   Patient's perception of discharge disposition home or selfcare   Readmission Within The Last 30 Days previous discharge plan unsuccessful   Does the patient currently use HME? Yes   Does the patient receive outpatient dialysis? Yes   Are there any open cases? Yes

## 2018-06-28 NOTE — ED NOTES
Rounding completed on this patient. She is AAOx4 with elevated bp at 244/137. Anti hypertensive's will be administered IVP and oral. Will reassess bp in 15mins. She is placed on cardiac monitoring at this time. Pt denies any CP,HA,SOB, weakness or visual disturbances. She states that she has taken her daily anti hypertensive's and was dialyzed on yesterday. She is sitting upright in high flowers position at this time. Restroom and comfort needs addressed. She denies any needs at this time. Wheels on stretcher remained locked and side rails raised x2. Call light within reach. Will continue to monitor for any changes.

## 2018-06-28 NOTE — CONSULTS
[facility]  Obstetrics & Gynecology  Consult Note    Patient Name: Holly Patel  MRN: [id number]  Admission Date: 2018  Hospital Length of Stay: 0 days  Code Status: Full Code  Primary Care Provider: Stan Sosa MD  Principal Problem: Hypertensive emergency    Inpatient consult to Obstetrics  Consult performed by: GITA VENTURA  Consult ordered by: SASKIA ARMENDARIZ        Subjective:     Chief Complaint: Vaginal bleeding    History of Present Illness:   Holly Patel is a 27 y.o. [id number] with PMH PMH of HTN, ESRD on dialysis (//), anemia, s/p liver transplant as baby on chronic immunosuppressants, chronic anemia, and HTN who presents with complaints of vaginal bleeding.   Patient is s/p LEEP per Dr. Marroquin on 6/15/18.  Postoperative course was complicated by vaginal bleeding requiring EUA with additional cervical sutures for hemostasis performed on . Patient was discharged from hospital on .  After this discharge, patient was admitted to ICU at [facility] for hypertensive emergency and required nicardipine drip.  She was discharged home with several blood pressure medications, one being a clonidine patch.  She states she took off her clonidine patch earlier this AM and she did not replace the patch because she was not home.  She denies HA, visual changes, or weakness.  She denies CP.   Patient also reports vaginal bleeding since being discharged. She reports requiring two pads at a time and having to change the pads every 2-3 hours. She reports the pads are saturated.  She is ambulating without dizziness, lightheadedness, or weakness.  She denies SOB or CP.  She has no symptoms of anemia.         No current facility-administered medications on file prior to encounter.      Current Outpatient Prescriptions on File Prior to Encounter   Medication Sig    acetaminophen-codeine 300-30mg (TYLENOL #3) 300-30 mg Tab Take 1 tablet by mouth every 6  (six) hours as needed.    aspirin 81 MG Chew Take 1 tablet (81 mg total) by mouth once daily.    cinacalcet (SENSIPAR) 30 MG Tab Take 1 tablet (30 mg total) by mouth daily with breakfast. (Patient taking differently: Take 30 mg by mouth. On Tuesday, Thursday, and Saturday with dialysis)    citalopram (CELEXA) 20 MG tablet TAKE 1 TABLET BY MOUTH EVERY DAY    cloNIDine 0.3 mg/24 hr td ptwk (CATAPRES) 0.3 mg/24 hr Place 1 patch onto the skin every 7 days.    famotidine (PEPCID) 40 MG tablet Take 1 tablet (40 mg total) by mouth once daily.    food supplemt, lactose-reduced (ENSURE ACTIVE HIGH PROTEIN) Liqd Take 236 mLs by mouth 2 (two) times daily.    hydrALAZINE (APRESOLINE) 50 MG tablet Take 1 tablet (50 mg total) by mouth every 8 (eight) hours. (Patient taking differently: Take 50 mg by mouth 2 (two) times daily. )    labetalol (NORMODYNE) 200 MG tablet Take 2 tablets (400 mg total) by mouth every 8 (eight) hours. (Patient taking differently: Take 400 mg by mouth every 12 (twelve) hours. )    loratadine (CLARITIN) 10 mg tablet Take 1 tablet (10 mg total) by mouth once daily.    montelukast (SINGULAIR) 10 mg tablet Take 1 tablet (10 mg total) by mouth every evening.    mycophenolate (CELLCEPT) 250 mg Cap Take 1,000 mg by mouth 2 (two) times daily.    NIFEdipine (PROCARDIA-XL) 90 MG (OSM) 24 hr tablet Take 1 tablet (90 mg total) by mouth once daily.    ondansetron (ZOFRAN) 4 MG tablet Take 1 tablet (4 mg total) by mouth every 6 (six) hours as needed for Nausea. (Patient taking differently: Take 4 mg by mouth once daily. )    predniSONE (DELTASONE) 1 MG tablet Take 1 mg by mouth every other day.    tacrolimus (PROGRAF) 1 MG Cap Take 7 capsules (7 mg total) by mouth every 12 (twelve) hours.    triamcinolone acetonide 0.1% (KENALOG) 0.1 % ointment AAA on arms, legs, and neck bid x 1-2 wks then prn flares only (Patient taking differently: Apply to affected area(s) on arms, legs, and neck twice daily x 1-2  wks then as needed for flares only)       Review of patient's allergies indicates:   Allergen Reactions    Chloral hydrate      Other reaction(s): Hallucinations  Other reaction(s): Hives    Hydrocodone Other (See Comments)     Mental status changes       Past Medical History:   Diagnosis Date    Anemia in ESRD (end-stage renal disease) 10/12/2015    dialysis es, thursday, sat; access left arm    Chronic rejection of liver transplant 3/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 ()    Moderate protein-calorie malnutrition 2017    MRSA bacteremia 2017    Pneumonia     Prophylactic immunotherapy 2014    Renovascular hypertension 10/2/2015    Secondary hyperparathyroidism 2017    Seizures     Sialadenitis 3/21/2018    Thrombocytopenia 2016    Thrombocytopenia 2016     Obstetric History       T0      L2     SAB0   TAB0   Ectopic0   Multiple0   Live Births2       # Outcome Date GA Lbr Peter/2nd Weight Sex Delivery Anes PTL Lv   2       CS-LTranv   JOANN   1       CS-LTranv   JOANN        Past Surgical History:   Procedure Laterality Date     SECTION      x 2    CONIZATION OF CERVIX USING LOOP ELECTROSURGICAL EXCISION PROCEDURE (LEEP) N/A 6/15/2018    Procedure: CONIZATION-CERVICAL-LEEP;  Surgeon: Neelam Marroquin MD;  Location: [facility] [place] OR;  Service: OB/GYN;  Laterality: N/A;    EXAMINATION UNDER ANESTHESIA N/A 2018    Procedure: Exam under anesthesia;  Surgeon: Neelam Marroquin MD;  Location: [facility] [place] OR;  Service: OB/GYN;  Laterality: N/A;     LIVER BIOPSY      LIVER TRANSPLANT  09/1992    PERINEORRHAPHY  6/19/2018    Procedure: SUTURE REPAIR,CERVIX;  Surgeon: Neelam Marroquin MD;  Location: [facility];  Service: OB/GYN;;    TUBAL LIGATION  2010     Family History     Problem Relation (Age of Onset)    Hypertension Mother, Father        Social History Main Topics    Smoking status: Never Smoker    Smokeless tobacco: Never Used    Alcohol use No    Drug use: No    Sexual activity: No     Review of Systems   Constitutional: Negative for chills and fever.   Respiratory: Negative for shortness of breath and wheezing.    Cardiovascular: Negative for chest pain.   Gastrointestinal: Negative for abdominal pain, diarrhea, nausea and vomiting.   Genitourinary: Positive for vaginal bleeding. Negative for dysuria, hematuria, pelvic pain and vaginal pain.   Neurological: Negative for headaches.     Objective:     Vital Signs (Most Recent):  Temp: 98.8 °F (37.1 °C) (06/28/18 0156)  Pulse: 86 (06/28/18 0238)  Resp: 18 (06/28/18 0038)  BP: (!) 171/105 (06/28/18 0238)  SpO2: 99 % (06/28/18 0238) Vital Signs (24h Range):  Temp:  [97.7 °F (36.5 °C)-98.8 °F (37.1 °C)] 98.8 °F (37.1 °C)  Pulse:  [] 86  Resp:  [15-20] 18  SpO2:  [98 %-100 %] 99 %  BP: (150-269)/() 171/105     Weight: 53.1 kg (117 lb 1 oz)  Body mass index is 19.48 kg/m².  Patient's last menstrual period was 05/30/2018 (exact date).    Physical Exam:   Constitutional: She is oriented to person, place, and time. She appears well-developed and well-nourished.     Eyes: EOM are normal.    Neck: Normal range of motion.    Cardiovascular: Normal rate.     Pulmonary/Chest: Effort normal. No respiratory distress. She has no wheezes.        Abdominal: Soft. She exhibits no distension. There is no tenderness. There is no rebound and no guarding.     Genitourinary:   Genitourinary Comments:   Moderate pooling present in vaginal vault and several blood clots evacuated.  Suture visible on anterior  cervix in addition to 3 and 6 o clock.  On examination of cervix initially, area of slow but persistent oozing present on posterior cervix (BPs 240s/110s).  Unable to identify one single area of bleeding.   Copious amounts of monsel's applied to cervical bed, persistent oozing continued to break through Monsel's.     Re-examination 2 hours later (after nicardipine gtt started and BP 150s/90s), clots again evacuated, however, smaller amount. Persistent oozing slower and much improved. Vagina packed with wet kerlex, monsel's used at end of packing placed against cervix.  Rapp not placed as patient does not produce urine.              Musculoskeletal: Normal range of motion.       Neurological: She is alert and oriented to person, place, and time.    Skin: Skin is warm and dry.    Psychiatric: She has a normal mood and affect. Her behavior is normal. Judgment and thought content normal.       Laboratory:  CBC:   Recent Labs  Lab 06/27/18 2040   WBC 3.05*   RBC 2.90*   HGB 8.0*   HCT 24.5*   PLT 70*   MCV 85   MCH 27.6   MCHC 32.7     CMP:   Recent Labs  Lab 06/27/18 2040   *   CALCIUM 9.5   ALBUMIN 3.0*   PROT 8.1      K 3.9   CO2 32*   CL 97   BUN 29*   CREATININE 6.1*   ALKPHOS 606*   ALT 29   AST 58*   BILITOT 0.6         Assessment/Plan:     Active Diagnoses:    Diagnosis Date Noted POA    PRINCIPAL PROBLEM:  Hypertensive emergency [I16.1] 06/22/2018 Yes    Seizures [R56.9]  Yes    ESRD (end stage renal disease) on dialysis [N18.6, Z99.2]  Not Applicable    Vaginal bleeding [N93.9] 10/11/2015 Yes   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 [Z94.4] 09/10/2012 Not Applicable     Chronic      Problems Resolved During this Admission:    Diagnosis Date Noted Date Resolved POA     Vaginal bleeding  - 6/15- with complicated post op course   - 6/19-21- Admitted for EUA with hemostatic cervical sutures for postop vaginal bleeding  - 6/22-23- Admitted to [facility] ICU for Hypertensive emergency  - Presented today  with continued vaginal bleeding  - CBC stable 8.0/24.5 from 8.0/23.6 5 days prior  - Examination today as above, slow persistent oozing on posterior aspect of cervix, unable to be controlled with Monsel's  - Re-examination showed improvement in bleeding once BP has decreased   - The decision was made to place vaginal packing as H/h is stable and patient's more acute medical problem is hypertensive emergency  - Patient is to be transferred to ICU for hypertensive emergency and continued on nicardipine drip per medicine team  - Rec continuing to trend CBC q6 hours to monitor H/h   - Will reassess vaginal bleeding in AM  - Consider EUA if BP is able to be controlled and patient continues to have significant vaginal bleeding       All other medical comorbidities to be managed per primary      Thank you for your consult. GYN will follow-up with patient. Please contact us if you have any additional questions.  GYN pager [phone]        Susan Pulliam MD  Obstetrics & Gynecology  [facility]

## 2018-06-28 NOTE — CONSULTS
Consult Note  Nephrology    Consult Requested By: Emily Funez, *  Reason for Consult: ESRD/Acc HTN    SUBJECTIVE:     History of Present Illness:  Patient is a 27 y.o. female presents with vaginal bleeding and accelerated HTN.  Extensive medical history as outlined below including end-stage renal disease on hemodialysis Tuesday Thursday and Saturdays with main campus nephrology Dr. Bass.  Multiple recent admissions due to noncompliance with medication therapy and admitted with accelerated HTN on Cardene drips.  Seen by OB/GYN for vaginal bleeding as she had just had a LEEP procedure done.  We have been asked to see the patient for recommendations regarding blood pressure management and dialysis needs.    Patient seen and examined in the emergency room.  Vital signs noted.  Discussed with ER staff.  At present denies any chest pain, shortness of breath, nausea, vomiting, fever, chills.  Mild discomfort and perineal area.    Epic reviewed.  Consent signed for hemodialysis.    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)    Moderate protein-calorie malnutrition 8/16/2017    MRSA bacteremia 8/6/2017    Pneumonia     Prophylactic immunotherapy 8/4/2014    Renovascular hypertension 10/2/2015    Secondary hyperparathyroidism 8/5/2017    Seizures     Sialadenitis 3/21/2018    Thrombocytopenia 4/12/2016     Past Surgical  History:   Procedure Laterality Date     SECTION      x 2    CONIZATION OF CERVIX USING LOOP ELECTROSURGICAL EXCISION PROCEDURE (LEEP) N/A 6/15/2018    Procedure: CONIZATION-CERVICAL-LEEP;  Surgeon: Neelam Marroquin MD;  Location: [facility];  Service: OB/GYN;  Laterality: N/A;    EXAMINATION UNDER ANESTHESIA N/A 2018    Procedure: Exam under anesthesia;  Surgeon: Neelam Marroquin MD;  Location: [facility];  Service: OB/GYN;  Laterality: N/A;    LIVER BIOPSY      LIVER TRANSPLANT  1992    PERINEORRHAPHY  2018    Procedure: SUTURE REPAIR,CERVIX;  Surgeon: Neelam Marroquin MD;  Location: [facility];  Service: OB/GYN;;    TUBAL LIGATION       Family History   Problem Relation Age of Onset    Hypertension Mother     Hypertension Father     Melanoma Neg Hx     Breast cancer Neg Hx     Colon cancer Neg Hx     Ovarian cancer Neg Hx      Social History   Substance Use Topics    Smoking status: Never Smoker    Smokeless tobacco: Never Used    Alcohol use No       Review of patient's allergies indicates:   Allergen Reactions    Chloral hydrate      Other reaction(s): Hallucinations  Other reaction(s): Hives    Hydrocodone Other (See Comments)     Mental status changes        Review of Systems:  Constitutional: No fever or chills  Respiratory: No cough or shortness of breath  Cardiovascular: No chest pain or palpitations  Gastrointestinal: No nausea or vomiting  Neurological: No confusion or weakness    OBJECTIVE:     Vital Signs (Most Recent)  Temp: 98.8 °F (37.1 °C) (18 0156)  Pulse: 92 (18 0908)  Resp: 18 (18 0500)  BP: (!) 197/104 (18 0908)  SpO2: 100 % (18 0908)    Vital Signs Range (Last 24H):  Temp:  [97.7 °F (36.5 °C)-98.8 °F (37.1 °C)]   Pulse:  []   Resp:  [15-20]   BP: (150-269)/()   SpO2:  [98 %-100 %]     No intake or output data in the 24 hours ending 18 0937    Physical Exam:  General appearance: Well developed, well  nourished  Eyes:  Conjunctivae/corneas clear. PERRL.  Lungs: Normal respiratory effort,   clear to auscultation bilaterally   Heart: Regular rate and rhythm, S1, S2 normal, no murmur, rub or herbert.  Abdomen: Soft, non-tender non-distended; bowel sounds normal; no masses,  no organomegaly  Extremities: No cyanosis or clubbing. No edema.    Skin: Skin color, texture, turgor normal. No rashes or lesions  Neurologic: Normal strength and tone. No focal numbness or weakness   Left arm AVF      Laboratory:    Recent Labs  Lab 06/28/18  0535   WBC 2.98*   RBC 2.66*   HGB 7.2*  7.2*   HCT 22.4*  22.4*   PLT 63*   MCV 84   MCH 27.1   MCHC 32.1     BMP:   Recent Labs  Lab 06/28/18  0535         K 4.1      CO2 31*   BUN 32*   CREATININE 6.7*   CALCIUM 9.2   MG 2.3     Lab Results   Component Value Date    CALCIUM 9.2 06/28/2018    PHOS 5.3 (H) 06/28/2018     BNP  No results for input(s): BNP, BNPTRIAGEBLO in the last 168 hours.  Lab Results   Component Value Date    URICACID 4.7 05/16/2018     Lab Results   Component Value Date    IRON 54 05/16/2018    TIBC 292 05/16/2018    FERRITIN 1,062 (H) 05/16/2018     Lab Results   Component Value Date    CALCIUM 9.2 06/28/2018    CAION 0.97 (L) 01/26/2018    PHOS 5.3 (H) 06/28/2018       Diagnostic Results:  No orders to display       ASSESSMENT/PLAN:     1. End-stage renal disease on hemodialysis Tuesday Thursday and Saturdays secondary to hypertensive nephrosclerosis (N 18.6, Z 99.2, I 12.0): Extensive renal history and formerly on home dialysis 4 times a week but change to outpatient unit 3 times a week with main [place] nephrology Dr. Bass.  Consent signed for routine dialysis and will try ultrafiltration for more blood pressure management.  We'll start ACE inhibitor for now. Renally dose meds, avoid nephrotoxins, and monitor I/O's closely.  2. Accelerated hypertension secondary to noncompliant medication regimen plus suboptimal regimen (I 12.0, Z 91.14):  See  MAR.  Start ACE inhibitor, continue with clonidine patch, increase hydralazine, Procardia, labetalol.  May consider changing labetalol to metoprolol or Coreg.  Ultrafiltration on hemodialysis.  We'll follow trends and adjust medication regimen as needed.  3. Anemia of chronic kidney disease and acute blood loss anemia from vaginal bleeding (D 63.1, N93.9):  Hold off on Epogen as having accelerated HTN.  May need transfusion if hemoglobin drops below 7.  4. Status post liver transplant (Z 94.4): Continue antirejection regimen.  Defer.  5. HPTH (N25.81):  Check PTH.  Continue sensipar as now.      Thanks for consult  See above  Will follow along.

## 2018-06-28 NOTE — ED NOTES
Pt resting in bed, side rails up x 2, call light w/in reach.  No acute distress noted at this time, will continue to monitor

## 2018-06-28 NOTE — ASSESSMENT & PLAN NOTE
- s/p LEEP 6/15/18   - seen by GYN in ED, and packing placed into vaginal canal  - H&H not requiring transfusion at present   - monitor H&H [id number]   - GYN consulted

## 2018-06-28 NOTE — ED PROVIDER NOTES
"Encounter Date: 6/27/2018       History     Chief Complaint   Patient presents with    Vaginal Bleeding     pt had a surgical procedure on 6/15, had vaginal bleeding 2 days later, was "stitched up" per pt, now bleeding has resumed     Patient is a 27-year-old female is with a past medical history of ESRD on dialysis (T, Th, Sat), status post liver transplant, chronic anemia, presenting to the emergency department with complaints of vaginal bleeding.  The patient admits that she had a LEEP procedure done on 06/15/2018.  She states that she experience heavy vaginal bleeding afterwards, and was ultimately admitted to the hospital on 06/19/2018 during which she required sutures to control the bleeding.  She states that she was discharged on 06/20/2018.  She reports on 06/22/2018, she started experiencing heavy vaginal bleeding again.  She states she is saturating multiple pads per hour, and has already gone through 1 packet.  She reports that she has gone through at least 6 pads today thus far.  She states that the bleeding currently is similar to what it was previously before she had the sutures placed.  She denies any associated pain. She admits that she called yesterday was advised to come to the emergency department however she did not have a ride.  She also reports that she was admitted to the [facility] in the intern room for elevated blood pressure.  She states that she took all of her oral medications this morning, however that she removed her clonidine patch earlier today.      The history is provided by the patient.     Review of patient's allergies indicates:   Allergen Reactions    Chloral hydrate      Other reaction(s): Hallucinations  Other reaction(s): Hives    Hydrocodone Other (See Comments)     Mental status changes     Past Medical History:   Diagnosis Date    Anemia in ESRD (end-stage renal disease) 10/12/2015    dialysis tues, thursday, sat; access left arm    Chronic rejection of liver " transplant 3/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 ()    Moderate protein-calorie malnutrition 2017    MRSA bacteremia 2017    Pneumonia     Prophylactic immunotherapy 2014    Renovascular hypertension 10/2/2015    Secondary hyperparathyroidism 2017    Seizures     Sialadenitis 3/21/2018    Thrombocytopenia 2016    Thrombocytopenia 2016     Past Surgical History:   Procedure Laterality Date     SECTION      x 2    CONIZATION OF CERVIX USING LOOP ELECTROSURGICAL EXCISION PROCEDURE (LEEP) N/A 6/15/2018    Procedure: CONIZATION-CERVICAL-LEEP;  Surgeon: Neelam Marroquin MD;  Location: [facility];  Service: OB/GYN;  Laterality: N/A;    EXAMINATION UNDER ANESTHESIA N/A 2018    Procedure: Exam under anesthesia;  Surgeon: Neelam Marroquin MD;  [facility];  Service: OB/GYN;  Laterality: N/A;    LIVER BIOPSY      LIVER TRANSPLANT  1992    PERINEORRHAPHY  2018    Procedure: SUTURE REPAIR,CERVIX;  Surgeon: Neelam Marroquin MD;  Location: [facility];  Service: OB/GYN;;    TUBAL LIGATION       Family History   Problem Relation Age of Onset    Hypertension Mother     Hypertension Father     Melanoma Neg Hx     Breast cancer Neg Hx     Colon cancer Neg Hx     Ovarian cancer Neg Hx      Social History   Substance Use Topics    Smoking status: Never Smoker    Smokeless tobacco: Never Used    Alcohol use No     Review of Systems   Constitutional: Negative for activity change, appetite change, chills, fatigue and fever.   HENT: Negative for  congestion, rhinorrhea and sore throat.    Eyes: Negative for photophobia and visual disturbance.   Respiratory: Negative for cough, shortness of breath and wheezing.    Cardiovascular: Negative for chest pain.   Gastrointestinal: Negative for abdominal pain, diarrhea, nausea and vomiting.   Genitourinary: Positive for vaginal bleeding. Negative for dysuria, hematuria and urgency.   Musculoskeletal: Negative for back pain, myalgias and neck pain.   Skin: Negative for color change and wound.   Neurological: Negative for weakness and headaches.   Psychiatric/Behavioral: Negative for agitation and confusion.       Physical Exam     Initial Vitals [06/27/18 1918]   BP Pulse Resp Temp SpO2   (!) 269/162 79 20 97.7 °F (36.5 °C) 98 %      MAP       --         Physical Exam    Nursing note and vitals reviewed.  Constitutional: She appears well-developed and well-nourished. She is not diaphoretic.  Non-toxic appearance. She does not have a sickly appearance. No distress.   A thin,  female accompanied by her daughter in the emergency department.  Speaking clearly in full sentences.  No acute distress.   HENT:   Head: Normocephalic and atraumatic.   Right Ear: External ear normal.   Left Ear: External ear normal.   Nose: Nose normal.   Mouth/Throat: Oropharynx is clear and moist.   Eyes: Conjunctivae and EOM are normal.   Neck: Normal range of motion. Neck supple.   Cardiovascular: Normal rate, regular rhythm and normal heart sounds.   Pulmonary/Chest: Breath sounds normal. No respiratory distress. She has no wheezes.   Abdominal: Soft. Bowel sounds are normal. She exhibits no distension. There is no tenderness. There is no rebound and no guarding.   Musculoskeletal: Normal range of motion.   Neurological: She is alert and oriented to person, place, and time.   Skin: Skin is warm.   Av fistula noted to the left upper extremity   Psychiatric: She has a normal mood and affect. Her behavior is normal. Judgment and  thought content normal.         ED Course   Critical Care  Date/Time: 6/27/2018 10:52 PM  Performed by: JUAN JOE  Authorized by: BLAKE MICHAEL   Direct patient critical care time: 10 minutes  Additional history critical care time: 10 minutes  Ordering / reviewing critical care time: 5 minutes  Documentation critical care time: 5 minutes  Consulting other physicians critical care time: 10 minutes  Other critical care time: 5 minutes  Total critical care time (exclusive of procedural time) : 45 minutes  Critical care was time spent personally by me on the following activities: development of treatment plan with patient or surrogate, discussions with consultants, evaluation of patient's response to treatment, examination of patient, obtaining history from patient or surrogate, ordering and performing treatments and interventions, ordering and review of laboratory studies, ordering and review of radiographic studies, re-evaluation of patient's condition and review of old charts.        Labs Reviewed   CBC W/ AUTO DIFFERENTIAL - Abnormal; Notable for the following:        Result Value    WBC 3.05 (*)     RBC 2.90 (*)     Hemoglobin 8.0 (*)     Hematocrit 24.5 (*)     RDW 16.9 (*)     Platelets 70 (*)     MPV 8.6 (*)     Lymph # 0.5 (*)     Mono # 0.1 (*)     Lymph% 15.4 (*)     Mono% 3.9 (*)     Eosinophil% 8.2 (*)     All other components within normal limits   COMPREHENSIVE METABOLIC PANEL - Abnormal; Notable for the following:     CO2 32 (*)     Glucose 127 (*)     BUN, Bld 29 (*)     Creatinine 6.1 (*)     Albumin 3.0 (*)     Alkaline Phosphatase 606 (*)     AST 58 (*)     eGFR if  10 (*)     eGFR if non  9 (*)     All other components within normal limits   TYPE & SCREEN          Imaging Results    None          Medical Decision Making:   History:   Old Medical Records: I decided to obtain old medical records.  Old Records Summarized: other records.       <> Summary of  Records: Reviewed medical records in [facility].  Patient was admitted from the emergency department on 06/19/2018 after receiving 2 units of blood.  She required exam under general anesthesia during which sutures replaced in the cervix.  Following this, she was admitted to the hospital on to 6/22/18 for emergent dialysis for hypertensive emergency.   Initial Assessment:   Urgent evaluation of a 27 y.o. female with a past medical history of ESRD on dialysis (T, Th, Sat), status post liver transplant, chronic anemia, presenting to the emergency department complaining of vaginal bleeding. Patient is afebrile, nontoxic appearing and hemodynamically stable. Physical exam reveals regular rate and rhythm, lungs are clear to auscultation bilaterally.  Abdomen is soft and nontender.  Deferred pelvic exam to OBGYN.  Reviewed medical record as noted above in epic.  Of note, the on arrival to the emergency department the patient's blood pressure was extremely elevated at 260 9/162.  Will try IV hydralazine.  She also states she has not taken her clonidine, will administer clonidine and continue to monitor closely.    Clinical Tests:   Lab Tests: Ordered and Reviewed  The following lab test(s) were unremarkable: CBC and CMP  ED Management:  This is H&H today is relatively 8.0/24.5 platelets are 70.  GYN at bedside.   Repeat BP after hydralazine and clonidine is relatively unchanged; 252/124. Will initiate nicardipine drip and plan for ICU admission pending GYN recommendations.  GYN states there is persistent oozing. They will plan for vaginal packing. Will discuss admission to ICU for blood pressure control with abdullahi.   Spoke with WILVER Longoria PA-C who will admit the patient for further evaluation and management.  The treatment plan was discussed with the patient who demonstrated understanding and comfort with plan. The patient's history, physical exam, and plan of care was discussed with and agreed upon with my supervising  physician.    Other:   I have discussed this case with another health care provider.       <> Summary of the Discussion: Dr. Feng  This note was created using M Modal Fluency Direct. There may be typographical errors secondary to dictation.                         Clinical Impression:     1. Hypertensive emergency    2. Pregnancy    3. Vagina bleeding           Disposition:   Disposition: Placed in Observation  Condition: Stable                        Johanna Weber PA-C  06/27/18 2245       Johanna Weber PA-C  06/27/18 2256

## 2018-06-28 NOTE — ASSESSMENT & PLAN NOTE
- historically difficult to control BP   - removed and did not replace clonidine patch today  - started on Nicardipine drip in ED, continue

## 2018-06-28 NOTE — ED NOTES
Pt requesting something for pain, currently 6/10. Pt in NAd with even, unlabored respirations. Pt sitting up in stretcher with HOB at 45 degrees. Pt speaking and acting appropriately. Denies any other complaints. Provider WILVER Leroy aware of pt's status in ED

## 2018-06-28 NOTE — PLAN OF CARE
Discharge Planning:  Patient admitted on 6-28-18  LOS-day 0    Chart reviewed, Care plan discussed  Discussed care plan with treatment team,  attending Dr Inman  Consults following are: case mgt., Ob-Gyn and nephrology    Current dispo: in HD today on the unit  Assigned bed 309 after UF  Case management  to follow

## 2018-06-28 NOTE — ED NOTES
Spoke to Ivana with [facility]. Dialysis RN en route to [facility] from [facility] and will contact ED when she arrives.

## 2018-06-28 NOTE — HOSPITAL COURSE
06/27/2018 - Patient presented to ED with hypertensive emergency. GYN Service consulted for vaginal bleeding s/p LEEP with subsequent revision requiring surgical sutures. Pelvic exam with bleeding at posterior edge. Monsels placed and packing placed.   06/28/2018 - HD # 1. Patient remains in ED, awaiting bed in ICU. Bleeding stable. Packing not saturated.  BP: (150-269)/() 188/124. Currently managed by ED while patient awaits placement in ED. H/H dropped from 8.0/24.5 > 7.2/22.4.   06/29/2018 - HD # 2. Patient placed on med/surg floor. Did not require ICU placement. BP being managed by medicine team. Vaginal packing pulled this AM.  Received dialysis yesterday.

## 2018-06-28 NOTE — ED NOTES
Pt and family updated on plan of care. Pt states fluid build-up to face and neck. Denies any need for pain medication at this time.

## 2018-06-28 NOTE — PLAN OF CARE
06/28/18 1645   Readmission Questionnaire   At the time of your discharge, did someone talk to you about what your health problems were? Yes   At the time of discharge, did someone talk to you about what to watch out for regarding worsening of your health problem? Yes   At the time of discharge, did someone talk to you about what to do if you experienced worsening of your health problem? Yes   At the time of discharge, did someone talk to you about which medication to take when you left the hospital and which ones to stop taking? Yes   At the time of discharge, did someone talk to you about when and where to follow up with a doctor after you left the hospital? Yes   How often do you need to have someone help you when you read instructions, pamphlets, or other written material from your doctor or pharmacy? Rarely   Do you have problems taking your medications as prescribed? No   Do you have any problems affording any of  your prescribed medications? No

## 2018-06-28 NOTE — PROGRESS NOTES
[facility]  Obstetrics & Gynecology  Progress Note    Patient Name: Holly Patel  MRN: [id number]  Admission Date: 2018  Primary Care Provider: Stan Sosa MD  Principal Problem: Hypertensive emergency    Subjective:     HPI:  Holly Patel is a 27 y.o. [id number] with PMH PMH of HTN, ESRD on dialysis (//), anemia, s/p liver transplant as baby on chronic immunosuppressants, chronic anemia, and HTN who presents with complaints of vaginal bleeding.   Patient is s/p LEEP per Dr. Marroquin on 6/15/18.  Postoperative course was complicated by vaginal bleeding requiring EUA with additional cervical sutures for hemostasis performed on . Patient was discharged from hospital on .  After this discharge, patient was admitted to ICU at [facility] for hypertensive emergency and required nicardipine drip.  She was discharged home with several blood pressure medications, one being a clonidine patch.  She states she took off her clonidine patch earlier this AM and she did not replace the patch because she was not home.  She denies HA, visual changes, or weakness.  She denies CP.   Patient also reports vaginal bleeding since being discharged. She reports requiring two pads at a time and having to change the pads every 2-3 hours. She reports the pads are saturated.  She is ambulating without dizziness, lightheadedness, or weakness.  She denies SOB or CP.  She has no symptoms of anemia.       Hospital Course   2018 - Patient presented to ED with hypertensive emergency. GYN Service consulted for vaginal bleeding s/p LEEP with subsequent revision requiring surgical sutures. Pelvic exam with bleeding at posterior edge. Monsels placed and packing placed.   2018 - HD # 1. Patient remains in ED, awaiting bed in ICU. Bleeding stable. Packing not saturated.  BP: (150-269)/() 188/124. Currently managed by ED while patient awaits placement in ED. H/H dropped from  8.0/24.5 > 7.2/22.4.     Interval History: Patient is without complaint at this time.  She denies fevers/chills or signs or symptoms of anemia.     Scheduled Meds:   cinacalcet  30 mg Oral Daily with breakfast    citalopram  20 mg Oral Daily    cloNIDine 0.3 mg/24 hr td ptwk  1 patch Transdermal Q7 Days    famotidine  40 mg Oral Daily    heparin (porcine)  5,000 Units Subcutaneous Q8H    hydrALAZINE  50 mg Oral BID    labetalol  400 mg Oral Q12H    mycophenolate  1,000 mg Oral BID    predniSONE  1 mg Oral Every other day    tacrolimus  7 mg Oral BID     Continuous Infusions:   niCARdipine Stopped (06/27/18 2220)     PRN Meds:lorazepam, ondansetron, sodium chloride 0.9%    Review of patient's allergies indicates:   Allergen Reactions    Chloral hydrate      Other reaction(s): Hallucinations  Other reaction(s): Hives    Hydrocodone Other (See Comments)     Mental status changes       Objective:     Vital Signs (Most Recent):  Temp: 98.8 °F (37.1 °C) (06/28/18 0156)  Pulse: 86 (06/28/18 0708)  Resp: 18 (06/28/18 0500)  BP: (!) 188/124 (06/28/18 0708)  SpO2: 100 % (06/28/18 0708) Vital Signs (24h Range):  Temp:  [97.7 °F (36.5 °C)-98.8 °F (37.1 °C)] 98.8 °F (37.1 °C)  Pulse:  [] 86  Resp:  [15-20] 18  SpO2:  [98 %-100 %] 100 %  BP: (150-269)/() 188/124     Weight: 53.1 kg (117 lb 1 oz)  Body mass index is 19.48 kg/m².  Patient's last menstrual period was 05/30/2018 (exact date).    I&O (Last 24H):  No intake or output data in the 24 hours ending 06/28/18 0815    Physical Exam:   Constitutional: She is oriented to person, place, and time. She appears well-developed and well-nourished. No distress.   Patient appears to be in no acute distress.     HENT:   Head: Normocephalic and atraumatic.    Eyes: Conjunctivae are normal.    Neck: Neck supple.    Cardiovascular: Normal rate.     Pulmonary/Chest: Effort normal. No respiratory distress.        Abdominal: Soft. She exhibits no distension. There is  no tenderness. There is no rebound and no guarding.     Genitourinary: Vagina normal.   Genitourinary Comments: Vaginal packing in place. No evidence of saturated packing. Packing at introitus is dry and without noted bleeding.                Neurological: She is alert and oriented to person, place, and time.    Skin: Skin is warm and dry. She is not diaphoretic.    Psychiatric: She has a normal mood and affect. Her behavior is normal. Judgment and thought content normal.       Laboratory:    Recent Results (from the past 336 hour(s))   CBC with Automated Differential    Collection Time: 06/28/18  5:35 AM   Result Value Ref Range    WBC 2.98 (L) 3.90 - 12.70 K/uL    Hemoglobin 7.2 (L) 12.0 - 16.0 g/dL    Hematocrit 22.4 (L) 37.0 - 48.5 %    Platelets 63 (L) 150 - 350 K/uL   CBC auto differential    Collection Time: 06/27/18  8:40 PM   Result Value Ref Range    WBC 3.05 (L) 3.90 - 12.70 K/uL    Hemoglobin 8.0 (L) 12.0 - 16.0 g/dL    Hematocrit 24.5 (L) 37.0 - 48.5 %    Platelets 70 (L) 150 - 350 K/uL   CBC auto differential    Collection Time: 06/22/18  5:56 PM   Result Value Ref Range    WBC 5.38 3.90 - 12.70 K/uL    Hemoglobin 8.0 (L) 12.0 - 16.0 g/dL    Hematocrit 23.6 (L) 37.0 - 48.5 %    Platelets 97 (L) 150 - 350 K/uL       I have personallly reviewed all pertinent lab results from the last 24 hours.    Assessment/Plan:     Vaginal bleeding    - 6/15- with complicated post op course   - 6/19-21- Admitted for EUA with hemostatic cervical sutures for postop vaginal bleeding  - 6/22-23- Admitted to [facility] ICU for Hypertensive emergency  - Presented today with continued vaginal bleeding  - CBC stable 8.0/24.5 from 8.0/23.6 5 days prior > 7.2/22.4. No Transfusion indicated at this time. Following CBC q 6.   - S/P Monsel's and vaginal packing performed at bedside in the ED. Packing without signs of saturation this AM.    - Uncontrolled BP most certainly contributing to patient's persistent vaginal bleeding.   -  Patient is to be transferred to ICU for hypertensive emergency and continued on nicardipine drip per medicine team. Awaiting bed and placement  - Vaginal bleeding stable this AM  - Consider EUA if BP is able to be controlled and patient continues to have significant vaginal bleeding. Do not feel surgical intervention is needed at this time.  Will continue to follow bleeding and patient's clinical status.             Benedict Roblero MD  Obstetrics & Gynecology  [facility]

## 2018-06-28 NOTE — HPI
Holly Patel is a 27 y.o. [id number] with PMH PMH of HTN, ESRD on dialysis (//), anemia, s/p liver transplant as baby on chronic immunosuppressants, chronic anemia, and HTN who presents with complaints of vaginal bleeding.   Patient is s/p LEEP per Dr. Marroquin on 6/15/18.  Postoperative course was complicated by vaginal bleeding requiring EUA with additional cervical sutures for hemostasis performed on . Patient was discharged from hospital on .  After this discharge, patient was admitted to ICU at [facility] for hypertensive emergency and required nicardipine drip.  She was discharged home with several blood pressure medications, one being a clonidine patch.  She states she took off her clonidine patch earlier this AM and she did not replace the patch because she was not home.  She denies HA, visual changes, or weakness.  She denies CP.   Patient also reports vaginal bleeding since being discharged. She reports requiring two pads at a time and having to change the pads every 2-3 hours. She reports the pads are saturated.  She is ambulating without dizziness, lightheadedness, or weakness.  She denies SOB or CP.  She has no symptoms of anemia.

## 2018-06-28 NOTE — ED NOTES
Report received from kal Sun. Pt moved to ED bed 3 for closer observation. Pt resting in bed, respiration even & unlabored, pt is AAOX3, denies any distress at this time, will cont. to monitor

## 2018-06-28 NOTE — ED NOTES
Leo RN with dialysis aware of pt's updated bed status. Pt to be transferred to room 309 when dialysis completed.

## 2018-06-28 NOTE — ASSESSMENT & PLAN NOTE
- 6/15- with complicated post op course   - 6/19-21- Admitted for EUA with hemostatic cervical sutures for postop vaginal bleeding  - 6/22-23- Admitted to [facility] ICU for Hypertensive emergency  - Presented today with continued vaginal bleeding  - CBC stable 8.0/24.5 from 8.0/23.6 5 days prior > 7.2/22.4. No Transfusion indicated at this time. Following CBC q 6.   - S/P Monsel's and vaginal packing performed at bedside in the ED. Packing without signs of saturation this AM.    - Uncontrolled BP most certainly contributing to patient's persistent vaginal bleeding.   - Patient is to be transferred to ICU for hypertensive emergency and continued on nicardipine drip per medicine team. Awaiting bed and placement  - Vaginal bleeding stable this AM  - Consider EUA if BP is able to be controlled and patient continues to have significant vaginal bleeding. Do not feel surgical intervention is needed at this time.  Will continue to follow bleeding and patient's clinical status.

## 2018-06-28 NOTE — ED NOTES
Pt resting in bed, side rails up x 2, call light w/in reach, no acute distress noted at this time, will continue to monitor.

## 2018-06-28 NOTE — ED NOTES
Assisted MD Doug at bedside with pt's pelvic exam. Large blood clots removed from vagina per MD. MD applied medication to vaginal vault to stop bleeding, pt tolerated well without s/s of adverse effects. B/P remains elevated, pt to be moved to ED bed for cardiac IV infusion. Updated on POC, verbalizes understanding.  Pt denies CP, SOB, HA, weakness or visual disturbances. Comfort needs addressed. Bed locked and in lowest position. Side rails up x2. Call light within reach. Will cont to monitor

## 2018-06-28 NOTE — ED NOTES
Pt lying down, respirations even/unlabored. NAD noted. Pt denies any current needs at this time. Updated pt on POC. Side rails up x2, call bell within reach. Will continue to monitor.

## 2018-06-28 NOTE — ED NOTES
Pt states relief from morphine rating 3/10. Pt resting comfortably respirations even/unlabored. NAD pt denies any current needs at this time. Side rails upx2, call bell within reach. Will continue to monitor.

## 2018-06-28 NOTE — ED NOTES
Pt sleeping in stretcher in NAD with even, unlabored respirations. Mother at bedside. Awaiting to hear from dialysis. Bed in lowest, locked position, side rails up x 2.

## 2018-06-28 NOTE — ASSESSMENT & PLAN NOTE
- no need for urgent dialysis at this time   - T/Th/Sa dialysis via left AV fistula   - Nephrology consulted

## 2018-06-28 NOTE — ED NOTES
Assumed care of pt from EDMOND Hardy. Pt reporting 5/10 lower abd pain, but tolerable at thsi time. Will page hospitalist and inform her of trending BP. Pt denies SOB or fatigue. Pt updated on plan of care. Remains close to nurse's station. Bed in lowest, locked position, side rail sup x 2. Allergy band, and limb alert on pt.

## 2018-06-28 NOTE — HPI
Ms. Holly Patel is a 27 y.o. female, with PMH of HTN, ESRD on dialysis (T/TH/Sa), anemia, s/p liver transplant on chronic immunosuppressants, chronic anemia, and HTN with multiple admissions for HTN Emergency, who presented to [facility] ED on 6/27/18 2/2 vaginal bleeding s/p LEEP on 6/15/18. Triage vital signs showed significantly elevated blood pressure. She states she has been compliant with anti-HTN meds, but has been having nausea and vomiting since this morning and has been unable to keep down any PO. Additionally, she did remove her clonidine patch this morning.   Insofar as her vaginal bleeding, she has previously been admitted (6/19-20) for the same s/p her LEEP. She has been filled 6 pads today with blood. During piror admission cervical sutures were placed. She was seen by GYN in the ED, and packing was placed. Plan to consult GYN upon admission to follow, and trend H&H once admitted.   The patient will be admitted to inpatient status into the ICU for treatment of her hypertensive emergency, and placed on a Nicardipine drip.

## 2018-06-28 NOTE — ED NOTES
"Pt c/o vaginal bleeding today. Pt states she had a surgical procedure done vaginally on 06/15/2018. Pt states she had this same complaint 2 days after the surgery and had to be "stitched up". Pt states she is not sure if this is her menses or not. No trauma. Pt is A & O x 3, denies SOB, fever, chills and N/V/D. Skin is warm and dry w/ pink mucosa.   "

## 2018-06-28 NOTE — H&P
"[facility] Hospital Medicine  History & Physical    Patient Name: Holly Patel  MRN: [id number]  Admission Date: 6/27/2018  Attending Physician: Emily Funez, *   Primary Care Provider: Stan Sosa MD         Patient information was obtained from patient, past medical records and ER records.     Subjective:     Principal Problem:Hypertensive emergency    Chief Complaint:   Chief Complaint   Patient presents with    Vaginal Bleeding     pt had a surgical procedure on 6/15, had vaginal bleeding 2 days later, was "stitched up" per pt, now bleeding has resumed        HPI: Ms. Holly Patel is a 27 y.o. female, with PMH of HTN, ESRD on dialysis (T/TH/Sa), anemia, s/p liver transplant on chronic immunosuppressants, chronic anemia, and HTN with multiple admissions for HTN Emergency, who presented to [facility] ED on 6/27/18 2/2 vaginal bleeding s/p LEEP on 6/15/18. Triage vital signs showed significantly elevated blood pressure. She states she has been compliant with anti-HTN meds, but has been having nausea and vomiting since this morning and has been unable to keep down any PO. Additionally, she did remove her clonidine patch this morning.   Insofar as her vaginal bleeding, she has previously been admitted (6/19-20) for the same s/p her LEEP. She has been filled 6 pads today with blood. During piror admission cervical sutures were placed. She was seen by GYN in the ED, and packing was placed. Plan to consult GYN upon admission to follow, and trend H&H once admitted.   The patient will be admitted to inpatient status into the ICU for treatment of her hypertensive emergency, and placed on a Nicardipine drip.     Past Medical History:   Diagnosis Date    Anemia in ESRD (end-stage renal disease) 10/12/2015    dialysis tues, thursday, sat; access left arm    Chronic rejection of liver transplant 3/22/2016    Depression     Encounter for blood transfusion     ESRD on " hemodialysis 2015    History of recent hospitalization 2018    pneumonia    History of splenomegaly 2016    Immunosuppressed 2017    Iron deficiency anemia secondary to inadequate dietary iron intake 2017    She [facility] iron [facility].   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 ()    Moderate protein-calorie malnutrition 2017    MRSA bacteremia 2017    Pneumonia     Prophylactic immunotherapy 2014    Renovascular hypertension 10/2/2015    Secondary hyperparathyroidism 2017    Seizures     Sialadenitis 3/21/2018    Thrombocytopenia 2016    Thrombocytopenia 2016       Past Surgical History:   Procedure Laterality Date     SECTION      x 2    CONIZATION OF CERVIX USING LOOP ELECTROSURGICAL EXCISION PROCEDURE (LEEP) N/A 6/15/2018    Procedure: CONIZATION-CERVICAL-LEEP;  Surgeon: Neelam Marroquin MD;  Location: [facility];  Service: OB/GYN;  Laterality: N/A;    EXAMINATION UNDER ANESTHESIA N/A 2018    Procedure: Exam under anesthesia;  Surgeon: Neelam Marroquin MD;  Location: [facility];  Service: OB/GYN;  Laterality: N/A;    LIVER BIOPSY      LIVER TRANSPLANT  1992    PERINEORRHAPHY  2018    Procedure: SUTURE REPAIR,CERVIX;  Surgeon: Neelam Marroquin MD;  Location: [facility];  Service: OB/GYN;;    TUBAL LIGATION         Review of patient's allergies indicates:   Allergen Reactions    Chloral hydrate      Other reaction(s): Hallucinations  Other reaction(s): Hives    Hydrocodone Other (See Comments)     Mental status changes       No current facility-administered medications on file prior to encounter.      Current Outpatient Prescriptions on File Prior to Encounter   Medication Sig    acetaminophen-codeine 300-30mg (TYLENOL #3) 300-30 mg Tab Take 1 tablet by mouth every 6 (six) hours as needed.    aspirin 81 MG Chew Take 1 tablet (81 mg total) by mouth  once daily.    cinacalcet (SENSIPAR) 30 MG Tab Take 1 tablet (30 mg total) by mouth daily with breakfast. (Patient taking differently: Take 30 mg by mouth. On Tuesday, Thursday, and Saturday with dialysis)    citalopram (CELEXA) 20 MG tablet TAKE 1 TABLET BY MOUTH EVERY DAY    cloNIDine 0.3 mg/24 hr td ptwk (CATAPRES) 0.3 mg/24 hr Place 1 patch onto the skin every 7 days.    famotidine (PEPCID) 40 MG tablet Take 1 tablet (40 mg total) by mouth once daily.    food supplemt, lactose-reduced (ENSURE ACTIVE HIGH PROTEIN) Liqd Take 236 mLs by mouth 2 (two) times daily.    hydrALAZINE (APRESOLINE) 50 MG tablet Take 1 tablet (50 mg total) by mouth every 8 (eight) hours. (Patient taking differently: Take 50 mg by mouth 2 (two) times daily. )    labetalol (NORMODYNE) 200 MG tablet Take 2 tablets (400 mg total) by mouth every 8 (eight) hours. (Patient taking differently: Take 400 mg by mouth every 12 (twelve) hours. )    loratadine (CLARITIN) 10 mg tablet Take 1 tablet (10 mg total) by mouth once daily.    montelukast (SINGULAIR) 10 mg tablet Take 1 tablet (10 mg total) by mouth every evening.    mycophenolate (CELLCEPT) 250 mg Cap Take 1,000 mg by mouth 2 (two) times daily.    NIFEdipine (PROCARDIA-XL) 90 MG (OSM) 24 hr tablet Take 1 tablet (90 mg total) by mouth once daily.    ondansetron (ZOFRAN) 4 MG tablet Take 1 tablet (4 mg total) by mouth every 6 (six) hours as needed for Nausea. (Patient taking differently: Take 4 mg by mouth once daily. )    predniSONE (DELTASONE) 1 MG tablet Take 1 mg by mouth every other day.    tacrolimus (PROGRAF) 1 MG Cap Take 7 capsules (7 mg total) by mouth every 12 (twelve) hours.    triamcinolone acetonide 0.1% (KENALOG) 0.1 % ointment AAA on arms, legs, and neck bid x 1-2 wks then prn flares only (Patient taking differently: Apply to affected area(s) on arms, legs, and neck twice daily x 1-2 wks then as needed for flares only)     Family History     Problem Relation (Age of  Onset)    Hypertension Mother, Father        Social History Main Topics    Smoking status: Never Smoker    Smokeless tobacco: Never Used    Alcohol use No    Drug use: No    Sexual activity: No     Review of Systems   Constitutional: Negative for appetite change, chills, diaphoresis and fever.   HENT: Negative for congestion, ear pain, postnasal drip, rhinorrhea, sinus pressure, sore throat and trouble swallowing.    Respiratory: Negative for cough, shortness of breath and wheezing.    Cardiovascular: Negative for chest pain and palpitations.   Gastrointestinal: Negative for abdominal pain, diarrhea, nausea and vomiting.   Genitourinary: Negative for dysuria, flank pain, frequency, hematuria and urgency.   Skin: Negative for color change, pallor, rash and wound.   Neurological: Negative for dizziness, weakness, light-headedness and headaches.   Psychiatric/Behavioral: Negative for confusion and decreased concentration.     Objective:     Vital Signs (Most Recent):  Temp: 98.8 °F (37.1 °C) (06/28/18 0156)  Pulse: 90 (06/28/18 0158)  Resp: 18 (06/28/18 0038)  BP: (!) 164/101 (06/28/18 0158)  SpO2: 99 % (06/28/18 0158) Vital Signs (24h Range):  Temp:  [97.7 °F (36.5 °C)-98.8 °F (37.1 °C)] 98.8 °F (37.1 °C)  Pulse:  [] 90  Resp:  [15-20] 18  SpO2:  [98 %-100 %] 99 %  BP: (150-269)/() 164/101     Weight: 53.1 kg (117 lb 1 oz)  Body mass index is 19.48 kg/m².    Physical Exam   Constitutional: She is oriented to person, place, and time. She appears well-developed and well-nourished. No distress.   HENT:   Head: Normocephalic and atraumatic.   Eyes: Conjunctivae and EOM are normal. Pupils are equal, round, and reactive to light. No scleral icterus.   Neck: Normal range of motion. Neck supple. No tracheal deviation present.   Cardiovascular: Normal rate, regular rhythm, normal heart sounds and intact distal pulses.  Exam reveals no gallop and no friction rub.    No murmur heard.  Pulmonary/Chest: Effort  normal and breath sounds normal. No stridor. No respiratory distress. She has no wheezes. She has no rales.   Abdominal: Soft. Bowel sounds are normal. She exhibits no distension and no mass. There is no tenderness. There is no guarding.   Neurological: She is alert and oriented to person, place, and time.   Skin: Skin is warm and dry. She is not diaphoretic.   Psychiatric: She has a normal mood and affect. Her behavior is normal. Judgment and thought content normal.   Nursing note and vitals reviewed.        CRANIAL NERVES     CN III, IV, VI   Pupils are equal, round, and reactive to light.  Extraocular motions are normal.        Significant Labs:   BMP:   Recent Labs  Lab 06/27/18 2040   *      K 3.9   CL 97   CO2 32*   BUN 29*   CREATININE 6.1*   CALCIUM 9.5     CBC:   Recent Labs  Lab 06/27/18 2040   WBC 3.05*   HGB 8.0*   HCT 24.5*   PLT 70*     CMP:   Recent Labs  Lab 06/27/18 2040      K 3.9   CL 97   CO2 32*   *   BUN 29*   CREATININE 6.1*   CALCIUM 9.5   PROT 8.1   ALBUMIN 3.0*   BILITOT 0.6   ALKPHOS 606*   AST 58*   ALT 29   ANIONGAP 11   EGFRNONAA 9*     Urine Studies: No results for input(s): COLORU, APPEARANCEUA, PHUR, SPECGRAV, PROTEINUA, GLUCUA, KETONESU, BILIRUBINUA, OCCULTUA, NITRITE, UROBILINOGEN, LEUKOCYTESUR, RBCUA, WBCUA, BACTERIA, SQUAMEPITHEL, HYALINECASTS in the last 48 hours.    Invalid input(s): ANSHUL    Significant Imaging: I have reviewed all pertinent imaging results/findings within the past 24 hours.   Imaging Results    None          Assessment/Plan:     * Hypertensive emergency    - historically difficult to control BP   - removed and did not replace clonidine patch today  - started on Nicardipine drip in ED, continue         Vaginal bleeding    - s/p LEEP 6/15/18   - seen by GYN in ED, and packing placed into vaginal canal  - H&H not requiring transfusion at present   - monitor H&H [id number]   - GYN consulted         ESRD (end stage renal disease) on  dialysis    - no need for urgent dialysis at this time   - T/Th/Sa dialysis via left AV fistula   - Nephrology consulted         Seizures    - per or notes these have occurred with prior HTN emergencies   - continue outpatient seizure meds   - seizure precautions   - PRN ativan           Liver replaced by transplant    - h/o chronic rejection   - continue immunosuppressants (tacrolimues & prednisone)             VTE Risk Mitigation         Ordered     heparin (porcine) injection 5,000 Units  Every 8 hours      06/28/18 0233     IP VTE HIGH RISK PATIENT  Once      06/28/18 0233     Place sequential compression device  Until discontinued      06/28/18 0233     Place LINDA hose  Until discontinued      06/28/18 0233     Place sequential compression device  Until discontinued      06/28/18 0233     IP VTE HIGH RISK PATIENT  Once      06/28/18 0233             Pao Longoria PA-C  Department of Hospital Medicine   [facility]

## 2018-06-28 NOTE — SUBJECTIVE & OBJECTIVE
Past Medical History:   Diagnosis Date    Anemia in ESRD (end-stage renal disease) 10/12/2015    dialysis es, thursday, sat; access left arm    Chronic rejection of liver transplant 3/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 ()    Moderate protein-calorie malnutrition 2017    MRSA bacteremia 2017    Pneumonia     Prophylactic immunotherapy 2014    Renovascular hypertension 10/2/2015    Secondary hyperparathyroidism 2017    Seizures     Sialadenitis 3/21/2018    Thrombocytopenia 2016    Thrombocytopenia 2016       Past Surgical History:   Procedure Laterality Date     SECTION      x 2    CONIZATION OF CERVIX USING LOOP ELECTROSURGICAL EXCISION PROCEDURE (LEEP) N/A 6/15/2018    Procedure: CONIZATION-CERVICAL-LEEP;  Surgeon: Neelam Marroquin MD;  Location: [facility];  Service: OB/GYN;  Laterality: N/A;    EXAMINATION UNDER ANESTHESIA N/A 2018    Procedure: Exam under anesthesia;  Surgeon: Neelam Marroquin MD;  Location: [facility];  Service: OB/GYN;  Laterality: N/A;    LIVER BIOPSY      LIVER TRANSPLANT  1992    PERINEORRHAPHY  2018    Procedure: SUTURE REPAIR,CERVIX;  Surgeon: Neelam Marroquin MD;  Location: [facility];  Service: OB/GYN;;    TUBAL LIGATION         Review of patient's allergies indicates:   Allergen Reactions    Chloral hydrate      Other reaction(s): Hallucinations  Other reaction(s): Hives    Hydrocodone Other (See Comments)     Mental status changes       No current facility-administered medications on file prior to  encounter.      Current Outpatient Prescriptions on File Prior to Encounter   Medication Sig    acetaminophen-codeine 300-30mg (TYLENOL #3) 300-30 mg Tab Take 1 tablet by mouth every 6 (six) hours as needed.    aspirin 81 MG Chew Take 1 tablet (81 mg total) by mouth once daily.    cinacalcet (SENSIPAR) 30 MG Tab Take 1 tablet (30 mg total) by mouth daily with breakfast. (Patient taking differently: Take 30 mg by mouth. On Tuesday, Thursday, and Saturday with dialysis)    citalopram (CELEXA) 20 MG tablet TAKE 1 TABLET BY MOUTH EVERY DAY    cloNIDine 0.3 mg/24 hr td ptwk (CATAPRES) 0.3 mg/24 hr Place 1 patch onto the skin every 7 days.    famotidine (PEPCID) 40 MG tablet Take 1 tablet (40 mg total) by mouth once daily.    food supplemt, lactose-reduced (ENSURE ACTIVE HIGH PROTEIN) Liqd Take 236 mLs by mouth 2 (two) times daily.    hydrALAZINE (APRESOLINE) 50 MG tablet Take 1 tablet (50 mg total) by mouth every 8 (eight) hours. (Patient taking differently: Take 50 mg by mouth 2 (two) times daily. )    labetalol (NORMODYNE) 200 MG tablet Take 2 tablets (400 mg total) by mouth every 8 (eight) hours. (Patient taking differently: Take 400 mg by mouth every 12 (twelve) hours. )    loratadine (CLARITIN) 10 mg tablet Take 1 tablet (10 mg total) by mouth once daily.    montelukast (SINGULAIR) 10 mg tablet Take 1 tablet (10 mg total) by mouth every evening.    mycophenolate (CELLCEPT) 250 mg Cap Take 1,000 mg by mouth 2 (two) times daily.    NIFEdipine (PROCARDIA-XL) 90 MG (OSM) 24 hr tablet Take 1 tablet (90 mg total) by mouth once daily.    ondansetron (ZOFRAN) 4 MG tablet Take 1 tablet (4 mg total) by mouth every 6 (six) hours as needed for Nausea. (Patient taking differently: Take 4 mg by mouth once daily. )    predniSONE (DELTASONE) 1 MG tablet Take 1 mg by mouth every other day.    tacrolimus (PROGRAF) 1 MG Cap Take 7 capsules (7 mg total) by mouth every 12 (twelve) hours.    triamcinolone acetonide 0.1%  (KENALOG) 0.1 % ointment AAA on arms, legs, and neck bid x 1-2 wks then prn flares only (Patient taking differently: Apply to affected area(s) on arms, legs, and neck twice daily x 1-2 wks then as needed for flares only)     Family History     Problem Relation (Age of Onset)    Hypertension Mother, Father        Social History Main Topics    Smoking status: Never Smoker    Smokeless tobacco: Never Used    Alcohol use No    Drug use: No    Sexual activity: No     Review of Systems   Constitutional: Negative for appetite change, chills, diaphoresis and fever.   HENT: Negative for congestion, ear pain, postnasal drip, rhinorrhea, sinus pressure, sore throat and trouble swallowing.    Respiratory: Negative for cough, shortness of breath and wheezing.    Cardiovascular: Negative for chest pain and palpitations.   Gastrointestinal: Negative for abdominal pain, diarrhea, nausea and vomiting.   Genitourinary: Negative for dysuria, flank pain, frequency, hematuria and urgency.   Skin: Negative for color change, pallor, rash and wound.   Neurological: Negative for dizziness, weakness, light-headedness and headaches.   Psychiatric/Behavioral: Negative for confusion and decreased concentration.     Objective:     Vital Signs (Most Recent):  Temp: 98.8 °F (37.1 °C) (06/28/18 0156)  Pulse: 90 (06/28/18 0158)  Resp: 18 (06/28/18 0038)  BP: (!) 164/101 (06/28/18 0158)  SpO2: 99 % (06/28/18 0158) Vital Signs (24h Range):  Temp:  [97.7 °F (36.5 °C)-98.8 °F (37.1 °C)] 98.8 °F (37.1 °C)  Pulse:  [] 90  Resp:  [15-20] 18  SpO2:  [98 %-100 %] 99 %  BP: (150-269)/() 164/101     Weight: 53.1 kg (117 lb 1 oz)  Body mass index is 19.48 kg/m².    Physical Exam   Constitutional: She is oriented to person, place, and time. She appears well-developed and well-nourished. No distress.   HENT:   Head: Normocephalic and atraumatic.   Eyes: Conjunctivae and EOM are normal. Pupils are equal, round, and reactive to light. No scleral  icterus.   Neck: Normal range of motion. Neck supple. No tracheal deviation present.   Cardiovascular: Normal rate, regular rhythm, normal heart sounds and intact distal pulses.  Exam reveals no gallop and no friction rub.    No murmur heard.  Pulmonary/Chest: Effort normal and breath sounds normal. No stridor. No respiratory distress. She has no wheezes. She has no rales.   Abdominal: Soft. Bowel sounds are normal. She exhibits no distension and no mass. There is no tenderness. There is no guarding.   Neurological: She is alert and oriented to person, place, and time.   Skin: Skin is warm and dry. She is not diaphoretic.   Psychiatric: She has a normal mood and affect. Her behavior is normal. Judgment and thought content normal.   Nursing note and vitals reviewed.        CRANIAL NERVES     CN III, IV, VI   Pupils are equal, round, and reactive to light.  Extraocular motions are normal.        Significant Labs:   BMP:   Recent Labs  Lab 06/27/18 2040   *      K 3.9   CL 97   CO2 32*   BUN 29*   CREATININE 6.1*   CALCIUM 9.5     CBC:   Recent Labs  Lab 06/27/18 2040   WBC 3.05*   HGB 8.0*   HCT 24.5*   PLT 70*     CMP:   Recent Labs  Lab 06/27/18 2040      K 3.9   CL 97   CO2 32*   *   BUN 29*   CREATININE 6.1*   CALCIUM 9.5   PROT 8.1   ALBUMIN 3.0*   BILITOT 0.6   ALKPHOS 606*   AST 58*   ALT 29   ANIONGAP 11   EGFRNONAA 9*     Urine Studies: No results for input(s): COLORU, APPEARANCEUA, PHUR, SPECGRAV, PROTEINUA, GLUCUA, KETONESU, BILIRUBINUA, OCCULTUA, NITRITE, UROBILINOGEN, LEUKOCYTESUR, RBCUA, WBCUA, BACTERIA, SQUAMEPITHEL, HYALINECASTS in the last 48 hours.    Invalid input(s): ANSHUL    Significant Imaging: I have reviewed all pertinent imaging results/findings within the past 24 hours.   Imaging Results    None

## 2018-06-28 NOTE — ASSESSMENT & PLAN NOTE
- per or notes these have occurred with prior HTN emergencies   - continue outpatient seizure meds   - seizure precautions   - PRN ativan

## 2018-06-29 VITALS
SYSTOLIC BLOOD PRESSURE: 171 MMHG | TEMPERATURE: 97 F | HEART RATE: 80 BPM | WEIGHT: 117.06 LBS | DIASTOLIC BLOOD PRESSURE: 88 MMHG | BODY MASS INDEX: 19.5 KG/M2 | HEIGHT: 65 IN | OXYGEN SATURATION: 100 % | RESPIRATION RATE: 18 BRPM

## 2018-06-29 LAB
ANION GAP SERPL CALC-SCNC: 10 MMOL/L
BASOPHILS # BLD AUTO: 0.02 K/UL
BASOPHILS NFR BLD: 0.5 %
BUN SERPL-MCNC: 19 MG/DL
CALCIUM SERPL-MCNC: 8.8 MG/DL
CHLORIDE SERPL-SCNC: 97 MMOL/L
CO2 SERPL-SCNC: 29 MMOL/L
CREAT SERPL-MCNC: 4.8 MG/DL
DIFFERENTIAL METHOD: ABNORMAL
EOSINOPHIL # BLD AUTO: 0.5 K/UL
EOSINOPHIL NFR BLD: 13.5 %
ERYTHROCYTE [DISTWIDTH] IN BLOOD BY AUTOMATED COUNT: 16.9 %
EST. GFR  (AFRICAN AMERICAN): 13 ML/MIN/1.73 M^2
EST. GFR  (NON AFRICAN AMERICAN): 12 ML/MIN/1.73 M^2
GLUCOSE SERPL-MCNC: 89 MG/DL
HCT VFR BLD AUTO: 21.4 %
HCT VFR BLD AUTO: 22.4 %
HCT VFR BLD AUTO: 22.4 %
HCT VFR BLD AUTO: 22.7 %
HGB BLD-MCNC: 7 G/DL
HGB BLD-MCNC: 7.3 G/DL
LYMPHOCYTES # BLD AUTO: 0.7 K/UL
LYMPHOCYTES NFR BLD: 18.5 %
MAGNESIUM SERPL-MCNC: 2 MG/DL
MCH RBC QN AUTO: 27.2 PG
MCHC RBC AUTO-ENTMCNC: 32.6 G/DL
MCV RBC AUTO: 84 FL
MONOCYTES # BLD AUTO: 0.2 K/UL
MONOCYTES NFR BLD: 4.5 %
NEUTROPHILS # BLD AUTO: 2.5 K/UL
NEUTROPHILS NFR BLD: 63 %
PHOSPHATE SERPL-MCNC: 4.9 MG/DL
PLATELET # BLD AUTO: 68 K/UL
PMV BLD AUTO: 9.5 FL
POTASSIUM SERPL-SCNC: 3.9 MMOL/L
RBC # BLD AUTO: 2.68 M/UL
SODIUM SERPL-SCNC: 136 MMOL/L
TACROLIMUS BLD-MCNC: 5.9 NG/ML
WBC # BLD AUTO: 4.01 K/UL

## 2018-06-29 PROCEDURE — 36415 COLL VENOUS BLD VENIPUNCTURE: CPT

## 2018-06-29 PROCEDURE — 80197 ASSAY OF TACROLIMUS: CPT

## 2018-06-29 PROCEDURE — 99221 1ST HOSP IP/OBS SF/LOW 40: CPT | Mod: 24,AI,, | Performed by: OBSTETRICS & GYNECOLOGY

## 2018-06-29 PROCEDURE — 83735 ASSAY OF MAGNESIUM: CPT

## 2018-06-29 PROCEDURE — 85018 HEMOGLOBIN: CPT | Mod: 91

## 2018-06-29 PROCEDURE — 85014 HEMATOCRIT: CPT | Mod: 91

## 2018-06-29 PROCEDURE — 25000003 PHARM REV CODE 250: Performed by: STUDENT IN AN ORGANIZED HEALTH CARE EDUCATION/TRAINING PROGRAM

## 2018-06-29 PROCEDURE — 94761 N-INVAS EAR/PLS OXIMETRY MLT: CPT

## 2018-06-29 PROCEDURE — 99239 HOSP IP/OBS DSCHRG MGMT >30: CPT | Mod: ,,, | Performed by: PHYSICIAN ASSISTANT

## 2018-06-29 PROCEDURE — 36430 TRANSFUSION BLD/BLD COMPNT: CPT

## 2018-06-29 PROCEDURE — 25000003 PHARM REV CODE 250: Performed by: INTERNAL MEDICINE

## 2018-06-29 PROCEDURE — 85025 COMPLETE CBC W/AUTO DIFF WBC: CPT

## 2018-06-29 PROCEDURE — 84100 ASSAY OF PHOSPHORUS: CPT

## 2018-06-29 PROCEDURE — 85018 HEMOGLOBIN: CPT

## 2018-06-29 PROCEDURE — 25000003 PHARM REV CODE 250: Performed by: NURSE PRACTITIONER

## 2018-06-29 PROCEDURE — 85014 HEMATOCRIT: CPT

## 2018-06-29 PROCEDURE — 25000003 PHARM REV CODE 250: Performed by: PHYSICIAN ASSISTANT

## 2018-06-29 PROCEDURE — 80048 BASIC METABOLIC PNL TOTAL CA: CPT

## 2018-06-29 PROCEDURE — 63600175 PHARM REV CODE 636 W HCPCS: Performed by: PHYSICIAN ASSISTANT

## 2018-06-29 RX ORDER — HYDRALAZINE HYDROCHLORIDE 25 MG/1
100 TABLET, FILM COATED ORAL EVERY 8 HOURS
Status: DISCONTINUED | OUTPATIENT
Start: 2018-06-29 | End: 2018-06-29 | Stop reason: HOSPADM

## 2018-06-29 RX ORDER — HYDRALAZINE HYDROCHLORIDE 100 MG/1
100 TABLET, FILM COATED ORAL EVERY 8 HOURS
Qty: 90 TABLET | Refills: 0 | Status: ON HOLD | OUTPATIENT
Start: 2018-06-29 | End: 2018-08-16 | Stop reason: HOSPADM

## 2018-06-29 RX ORDER — FAMOTIDINE 40 MG/1
20 TABLET, FILM COATED ORAL DAILY
Qty: 15 TABLET | Refills: 11 | Status: SHIPPED | OUTPATIENT
Start: 2018-06-29 | End: 2019-01-01

## 2018-06-29 RX ORDER — CINACALCET 30 MG/1
60 TABLET, FILM COATED ORAL
Status: DISCONTINUED | OUTPATIENT
Start: 2018-06-29 | End: 2018-06-29 | Stop reason: HOSPADM

## 2018-06-29 RX ORDER — CALCITRIOL 0.25 UG/1
0.25 CAPSULE ORAL
Status: DISCONTINUED | OUTPATIENT
Start: 2018-06-29 | End: 2018-06-29 | Stop reason: HOSPADM

## 2018-06-29 RX ORDER — LISINOPRIL 40 MG/1
40 TABLET ORAL DAILY
Qty: 30 TABLET | Refills: 0 | Status: ON HOLD | OUTPATIENT
Start: 2018-06-30 | End: 2018-08-16 | Stop reason: HOSPADM

## 2018-06-29 RX ORDER — CALCITRIOL 0.25 UG/1
0.25 CAPSULE ORAL
Qty: 12 CAPSULE | Refills: 0 | Status: SHIPPED | OUTPATIENT
Start: 2018-07-02 | End: 2018-08-01

## 2018-06-29 RX ORDER — FAMOTIDINE 20 MG/1
20 TABLET, FILM COATED ORAL DAILY
Status: DISCONTINUED | OUTPATIENT
Start: 2018-06-30 | End: 2018-06-29 | Stop reason: HOSPADM

## 2018-06-29 RX ORDER — HEPARIN SODIUM 5000 [USP'U]/ML
5000 INJECTION, SOLUTION INTRAVENOUS; SUBCUTANEOUS EVERY 12 HOURS
Status: DISCONTINUED | OUTPATIENT
Start: 2018-06-29 | End: 2018-06-29 | Stop reason: HOSPADM

## 2018-06-29 RX ADMIN — LISINOPRIL 40 MG: 10 TABLET ORAL at 09:06

## 2018-06-29 RX ADMIN — HEPARIN SODIUM 5000 UNITS: 5000 INJECTION, SOLUTION INTRAVENOUS; SUBCUTANEOUS at 06:06

## 2018-06-29 RX ADMIN — OXYCODONE HYDROCHLORIDE AND ACETAMINOPHEN 1 TABLET: 10; 325 TABLET ORAL at 06:06

## 2018-06-29 RX ADMIN — Medication 1 CAPSULE: at 09:06

## 2018-06-29 RX ADMIN — HYDRALAZINE HYDROCHLORIDE 100 MG: 25 TABLET, FILM COATED ORAL at 09:06

## 2018-06-29 RX ADMIN — CITALOPRAM HYDROBROMIDE 20 MG: 20 TABLET ORAL at 09:06

## 2018-06-29 RX ADMIN — TACROLIMUS 7 MG: 1 CAPSULE ORAL at 09:06

## 2018-06-29 RX ADMIN — CINACALCET HYDROCHLORIDE 60 MG: 30 TABLET, COATED ORAL at 09:06

## 2018-06-29 RX ADMIN — LABETALOL HYDROCHLORIDE 400 MG: 200 TABLET, FILM COATED ORAL at 09:06

## 2018-06-29 RX ADMIN — OXYCODONE HYDROCHLORIDE AND ACETAMINOPHEN 1 TABLET: 10; 325 TABLET ORAL at 12:06

## 2018-06-29 RX ADMIN — HYDRALAZINE HYDROCHLORIDE 100 MG: 25 TABLET, FILM COATED ORAL at 02:06

## 2018-06-29 RX ADMIN — MYCOPHENOLATE MOFETIL 1000 MG: 250 CAPSULE ORAL at 09:06

## 2018-06-29 RX ADMIN — FAMOTIDINE 40 MG: 20 TABLET ORAL at 09:06

## 2018-06-29 RX ADMIN — NIFEDIPINE 90 MG: 30 TABLET, FILM COATED, EXTENDED RELEASE ORAL at 09:06

## 2018-06-29 RX ADMIN — CALCITRIOL 0.25 MCG: 0.25 CAPSULE, LIQUID FILLED ORAL at 09:06

## 2018-06-29 NOTE — ASSESSMENT & PLAN NOTE
- 6/15- with complicated post op course   - 6/19-21- Admitted for EUA with hemostatic cervical sutures for postop vaginal bleeding  - 6/22-23- Admitted to [facility] ICU for Hypertensive emergency  - Presented today with continued vaginal bleeding  - CBC stable 8.0/24.5 from 8.0/23.6 5 days prior > 7.2/22.4 > 7.6/22 > 7.3/22. No Transfusion indicated at this time. Will D/C CBC q 6 as patient is no longer bleeding.   - S/P Monsel's and vaginal packing performed at bedside in the ED. Packing without signs of saturation this AM.    - Uncontrolled BP most certainly contributing to patient's persistent vaginal bleeding.   - Patient placed on med/surg for hypertensive emergency and continued on nicardipine drip per medicine team. Awaiting bed and placement  - Vaginal bleeding again stable this AM. Following pad counts.   - Consider EUA if BP is able to be controlled and patient continues to have significant vaginal bleeding. Again, do not feel surgical intervention is needed at this time.  Will continue to follow bleeding and patient's clinical status.

## 2018-06-29 NOTE — NURSING
Discharge instructions given, questions answered, pt acknowledged understanding. IV's removed with catheter intact. Belongings sent home with pt. Transported to the [facility] via wheelchair.

## 2018-06-29 NOTE — DISCHARGE SUMMARY
[facility] Hospital Medicine  Discharge Summary      Patient Name: Holly Patel  MRN: [id number]  Admission Date: 6/27/2018  Hospital Length of Stay: 1 days  Discharge Date and Time:  06/29/2018 2:06 PM  Attending Physician: Johnathan Mejia MD   Discharging Provider: Ivana Diaz PA-C  Primary Care Provider: Stan Sosa MD      HPI:   Ms. Holly Patel is a 27 y.o. female, with PMH of HTN, ESRD on dialysis (T/TH/Sa), anemia, s/p liver transplant on chronic immunosuppressants, chronic anemia, and HTN with multiple admissions for HTN Emergency, who presented to [facility] ED on 6/27/18 2/2 vaginal bleeding s/p LEEP on 6/15/18. Triage vital signs showed significantly elevated blood pressure. She states she has been compliant with anti-HTN meds, but has been having nausea and vomiting since this morning and has been unable to keep down any PO. Additionally, she did remove her clonidine patch this morning.   Insofar as her vaginal bleeding, she has previously been admitted (6/19-20) for the same s/p her LEEP. She has been filled 6 pads today with blood. During piror admission cervical sutures were placed. She was seen by GYN in the ED, and packing was placed. Plan to consult GYN upon admission to follow, and trend H&H once admitted.   The patient will be admitted to inpatient status into the ICU for treatment of her hypertensive emergency, and placed on a Nicardipine drip.     * No surgery found *      Hospital Course:   Admitted with Hypertensive emergency suspect secondary to noncompliant medication regimen placed on Nicardipine drip, weaned, home medications restarted. Renal recommendations ACE inhibitor added, continued with clonidine patch, increased hydralazine, Procardia, labetalol. Ultrafiltration on hemodialysis. BP improved. Seen and evaluated by OB/GYN s/p Monsel's and vaginal packing performed at bedside in the ED. Packing without signs of saturation this AM.   Uncontrolled BP most certainly contributing to patient's persistent vaginal bleeding. Do not feel surgical intervention is needed at this time. Follow up with OB/GYN. Clinically improved, vitals stable, discharged home.     Discussed with patient and mother regarding new BP med, Lisinopril. Medication use and side effects explained. Instructed to cease taking if she experiences any symptoms and go to ER. All listed on discharge paperwork. Both patient and mother understand and agree with plan.      Consults:   Consults         Status Ordering Provider     Inpatient consult to Gynecology  Once     Provider:  (Not yet assigned)    Acknowledged SASKIA ARMENDARIZ.     Inpatient consult to Nephrology-[facility]  Once     Provider:  Kennedy Leroy NP    Completed SASKIA ARMENDARIZ     Inpatient consult to Nephrology-[facility]  Once     Provider:  Kennedy Leroy NP    Completed SASKIA ARMENDARIZ.     Inpatient consult to Obstetrics / Gynecology  Once     Provider:  (Not yet assigned)    Completed JUAN JOE          * Hypertensive emergency    - historically difficult to control BP   - removed and did not replace clonidine patch day of admission  - started on Nicardipine drip in ED, weaned off   - nephrology started ACE inhibitor, continue with clonidine patch, increased hydralazine, Procardia, labetalol. - - Ultrafiltation on hemodialysis  - improved        Seizures    - per piror notes these have occurred with prior HTN emergencies   - continue outpatient seizure meds             ESRD (end stage renal disease) on dialysis    - no need for urgent dialysis at this time   - T/Th/Sa dialysis via left AV fistula         Vaginal bleeding    - s/p LEEP 6/15/18   - seen by GYN in ED, and packing placed into vaginal canal  - H&H not requiring transfusion at present   - Vaginal bleeding again stable this AM  - follow up outpt        Liver replaced by transplant    - h/o chronic rejection   - continue immunosuppressants  (tacrolimues & prednisone)             Final Active Diagnoses:    Diagnosis Date Noted POA    PRINCIPAL PROBLEM:  Hypertensive emergency [I16.1] 06/22/2018 Yes    Seizures [R56.9]  Yes    ESRD (end stage renal disease) on dialysis [N18.6, Z99.2]  Not Applicable    Vaginal bleeding [N93.9] 10/11/2015 Yes   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 [Z94.4] 09/10/2012 Not Applicable     Chronic      Problems Resolved During this Admission:    Diagnosis Date Noted Date Resolved POA       Discharged Condition: stable    Disposition: Home or Self Care    Follow Up:  Follow-up Information     Schedule an appointment as soon as possible for a visit with Viktor Bass MD.    Specialty:  Nephrology  Why:  For Hospital Followup Within 1- 2 weeks.  Contact information:  [address]  [place] [address] [phone]             Schedule an appointment as soon as possible for a visit with Neelam Marroquin MD.    Specialties:  Obstetrics, Obstetrics and Gynecology  Why:  For Hospital Followup Within 1- 2 weeks.  Contact information:  [address] [address] [phone]             Schedule an appointment as soon as possible for a visit with Stan Sosa MD.    Specialty:  Internal Medicine  Contact information:  [address]  [place] [address] [phone]                 Patient Instructions:     Diet renal     Notify your health care provider if you experience any of the following:  temperature >100.4     Notify your health care provider if you experience any of the following:  persistent nausea and vomiting or diarrhea     Notify your health care provider if you experience any of the following:  severe uncontrolled pain     Notify your health care provider if you experience any of the following:  difficulty breathing or increased cough     Notify your health care provider if you experience any of the following:  severe persistent headache     Notify your health care provider if you  experience any of the following:  persistent dizziness, light-headedness, or visual disturbances     Notify your health care provider if you experience any of the following:  increased confusion or weakness     Activity as tolerated         Significant Diagnostic Studies: Labs:   CMP   Recent Labs  Lab 06/27/18 2040 06/28/18  0535 06/29/18  0506    141 136   K 3.9 4.1 3.9   CL 97 100 97   CO2 32* 31* 29   * 103 89   BUN 29* 32* 19   CREATININE 6.1* 6.7* 4.8*   CALCIUM 9.5 9.2 8.8   PROT 8.1  --   --    ALBUMIN 3.0*  --   --    BILITOT 0.6  --   --    ALKPHOS 606*  --   --    AST 58*  --   --    ALT 29  --   --    ANIONGAP 11 10 10   ESTGFRAFRICA 10* 9* 13*   EGFRNONAA 9* 8* 12*   , CBC   Recent Labs  Lab 06/27/18 2040 06/28/18  0535  06/28/18  2204 06/29/18  0015 06/29/18  0506   WBC 3.05* 2.98*  --   --   --  4.01   HGB 8.0* 7.2*  7.2*  < > 7.3* 7.0* 7.3*  7.3*   HCT 24.5* 22.4*  22.4*  < > 22.3* 21.4* 22.4*  22.4*   PLT 70* 63*  --   --   --  68*   < > = values in this interval not displayed. and All labs within the past 24 hours have been reviewed  Radiology:   Imaging Results    None          Medications:  Reconciled Home Medications:      Medication List      START taking these medications    calcitRIOL 0.25 MCG Cap  Commonly known as:  ROCALTROL  Take 1 capsule (0.25 mcg total) by mouth every Mon, Wed, Fri.  Start taking on:  7/2/2018     lisinopril 40 MG tablet  Commonly known as:  PRINIVIL,ZESTRIL  Take 1 tablet (40 mg total) by mouth once daily.  Start taking on:  6/30/2018     vitamin renal formula (B-complex-vitamin c-folic acid) 1 mg Cap  Commonly known as:  NEPHROCAP  Take 1 capsule by mouth once daily.  Start taking on:  6/30/2018        CHANGE how you take these medications    cinacalcet 30 MG Tab  Commonly known as:  SENSIPAR  Take 1 tablet (30 mg total) by mouth daily with breakfast.  What changed:  · when to take this  · additional instructions     famotidine 40 MG  tablet  Commonly known as:  PEPCID  Take 0.5 tablets (20 mg total) by mouth once daily.  What changed:  how much to take     hydrALAZINE 100 MG tablet  Commonly known as:  APRESOLINE  Take 1 tablet (100 mg total) by mouth every 8 (eight) hours.  What changed:  · medication strength  · how much to take     labetalol 200 MG tablet  Commonly known as:  NORMODYNE  Take 2 tablets (400 mg total) by mouth every 8 (eight) hours.  What changed:  when to take this     ondansetron 4 MG tablet  Commonly known as:  ZOFRAN  Take 1 tablet (4 mg total) by mouth every 6 (six) hours as needed for Nausea.  What changed:  when to take this     triamcinolone acetonide 0.1% 0.1 % ointment  Commonly known as:  KENALOG  AAA on arms, legs, and neck bid x 1-2 wks then prn flares only  What changed:  additional instructions        CONTINUE taking these medications    acetaminophen-codeine 300-30mg 300-30 mg Tab  Commonly known as:  TYLENOL #3  Take 1 tablet by mouth every 6 (six) hours as needed.     aspirin 81 MG Chew  Take 1 tablet (81 mg total) by mouth once daily.     citalopram 20 MG tablet  Commonly known as:  CELEXA  TAKE 1 TABLET BY MOUTH EVERY DAY     cloNIDine 0.3 mg/24 hr td ptwk 0.3 mg/24 hr  Commonly known as:  CATAPRES  Place 1 patch onto the skin every 7 days.     loratadine 10 mg tablet  Commonly known as:  CLARITIN  Take 1 tablet (10 mg total) by mouth once daily.     montelukast 10 mg tablet  Commonly known as:  SINGULAIR  Take 1 tablet (10 mg total) by mouth every evening.     mycophenolate 250 mg Cap  Commonly known as:  CELLCEPT  Take 1,000 mg by mouth 2 (two) times daily.     NIFEdipine 90 MG (OSM) 24 hr tablet  Commonly known as:  PROCARDIA-XL  Take 1 tablet (90 mg total) by mouth once daily.     predniSONE 1 MG tablet  Commonly known as:  DELTASONE  Take 1 mg by mouth every other day.     tacrolimus 1 MG Cap  Commonly known as:  PROGRAF  Take 7 capsules (7 mg total) by mouth every 12 (twelve) hours.        ASK your  doctor about these medications    food supplemt, lactose-reduced Liqd  Commonly known as:  ENSURE ACTIVE HIGH PROTEIN  Take 236 mLs by mouth 2 (two) times daily.            Indwelling Lines/Drains at time of discharge:   Lines/Drains/Airways     Drain                 Hemodialysis AV Fistula Left forearm -- days                Time spent on the discharge of patient: >30 minutes  Patient was seen and examined on the date of discharge and determined to be suitable for discharge.         Ivana Diaz PA-C  Department of Hospital Medicine  [facility]

## 2018-06-29 NOTE — SUBJECTIVE & OBJECTIVE
Interval History: Patient states that she is doing well. She denies pain or signs/symptoms of anemia.  States she has not noted any vaginal bleeding since packing was placed.      Scheduled Meds:   cinacalcet  30 mg Oral Daily with breakfast    citalopram  20 mg Oral Daily    cloNIDine 0.3 mg/24 hr td ptwk  1 patch Transdermal Q7 Days    famotidine  40 mg Oral Daily    heparin (porcine)  5,000 Units Subcutaneous Q8H    hydrALAZINE  100 mg Oral BID    labetalol  400 mg Oral Q12H    lisinopril  40 mg Oral Daily    mycophenolate  1,000 mg Oral BID    NIFEdipine  90 mg Oral Daily    predniSONE  1 mg Oral Every other day    tacrolimus  7 mg Oral BID    vitamin renal formula (B-complex-vitamin c-folic acid)  1 capsule Oral Daily     Continuous Infusions:    PRN Meds:lorazepam, ondansetron, oxyCODONE-acetaminophen, oxyCODONE-acetaminophen, sodium chloride 0.9%    Review of patient's allergies indicates:   Allergen Reactions    Chloral hydrate      Other reaction(s): Hallucinations  Other reaction(s): Hives    Hydrocodone Other (See Comments)     Mental status changes       Objective:     Vital Signs (Most Recent):  Temp: 96.9 °F (36.1 °C) (06/29/18 0448)  Pulse: 85 (06/29/18 0448)  Resp: 18 (06/28/18 2002)  BP: (!) 171/86 (06/29/18 0448)  SpO2: 100 % (06/29/18 0448) Vital Signs (24h Range):  Temp:  [96.9 °F (36.1 °C)-98.8 °F (37.1 °C)] 96.9 °F (36.1 °C)  Pulse:  [80-92] 85  Resp:  [18-20] 18  SpO2:  [98 %-100 %] 100 %  BP: (131-207)/() 171/86     Weight: 53.1 kg (117 lb 1 oz)  Body mass index is 19.48 kg/m².  Patient's last menstrual period was 05/30/2018 (exact date).    I&O (Last 24H):    Intake/Output Summary (Last 24 hours) at 06/29/18 0722  Last data filed at 06/28/18 1700   Gross per 24 hour   Intake              500 ml   Output             3500 ml   Net            -3000 ml       Physical Exam:   Constitutional: She is oriented to person, place, and time. She appears well-developed and  well-nourished. No distress.   Patient appears to be in no acute distress.     HENT:   Head: Normocephalic and atraumatic.    Eyes: Conjunctivae are normal.    Neck: Neck supple.    Cardiovascular: Normal rate.     Pulmonary/Chest: Effort normal. No respiratory distress.        Abdominal: Soft. She exhibits no distension. There is no tenderness. There is no rebound and no guarding.     Genitourinary: Vagina normal.   Genitourinary Comments: Vaginal packing removed. No bleeding noted after removal of packing. Packing not saturated.                 Neurological: She is alert and oriented to person, place, and time.    Skin: Skin is warm and dry. She is not diaphoretic.    Psychiatric: She has a normal mood and affect. Her behavior is normal. Judgment and thought content normal.       Laboratory:    Recent Results (from the past 336 hour(s))   CBC with Automated Differential    Collection Time: 06/29/18  5:06 AM   Result Value Ref Range    WBC 4.01 3.90 - 12.70 K/uL    Hemoglobin 7.3 (L) 12.0 - 16.0 g/dL    Hematocrit 22.4 (L) 37.0 - 48.5 %    Platelets 68 (L) 150 - 350 K/uL   CBC with Automated Differential    Collection Time: 06/28/18  5:35 AM   Result Value Ref Range    WBC 2.98 (L) 3.90 - 12.70 K/uL    Hemoglobin 7.2 (L) 12.0 - 16.0 g/dL    Hematocrit 22.4 (L) 37.0 - 48.5 %    Platelets 63 (L) 150 - 350 K/uL   CBC auto differential    Collection Time: 06/27/18  8:40 PM   Result Value Ref Range    WBC 3.05 (L) 3.90 - 12.70 K/uL    Hemoglobin 8.0 (L) 12.0 - 16.0 g/dL    Hematocrit 24.5 (L) 37.0 - 48.5 %    Platelets 70 (L) 150 - 350 K/uL       I have personallly reviewed all pertinent lab results from the last 24 hours.

## 2018-06-29 NOTE — TREATMENT PLAN
6/29/2018    Patient: Holly Patel    YOB: 1990    To whom it may concern,    Holly Patel was admitted to the hospital on 6/27/2018 and was discharged on 6/29/2018.  Her mother, Myrna Randolph, was present to assist in her care.     If you have any questions or concerns, please don't hesitate to contact the department of hospital medicine at [phone]            Sincerely,        Ivana Diaz PA-C  Department of Hospital Medicine

## 2018-06-29 NOTE — PROGRESS NOTES
"Nephrology  Progress Note    Admit Date: 6/27/2018   LOS: 1 day     SUBJECTIVE:     Follow-up For:  Hypertensive emergency/ESRD    Interval History:     Uneventful night.  No further bleeding.  BP improved with new regimen.  Ready to go home.  Discussed with family at bedside.      Review of Systems:  Constitutional: No fever or chills  Respiratory: No cough or shortness of breath  Cardiovascular: No chest pain or palpitations  Gastrointestinal: No nausea or vomiting  Neurological: No confusion or weakness    OBJECTIVE:     Vital Signs Range (Last 24H):  BP (!) 181/99 (BP Location: Right leg, Patient Position: Lying)   Pulse 79   Temp 96.1 °F (35.6 °C) (Oral)   Resp 20   Ht 5' 5" (1.651 m)   Wt 53.1 kg (117 lb 1 oz)   LMP 05/30/2018 (Exact Date)   SpO2 100%   Breastfeeding? No   BMI 19.48 kg/m²     Temp:  [96.1 °F (35.6 °C)-98.8 °F (37.1 °C)]   Pulse:  [79-92]   Resp:  [18-20]   BP: (131-181)/(75-99)   SpO2:  [98 %-100 %]     I & O (Last 24H):  Intake/Output Summary (Last 24 hours) at 06/29/18 0912  Last data filed at 06/28/18 1700   Gross per 24 hour   Intake              500 ml   Output             3500 ml   Net            -3000 ml       Physical Exam:    General appearance: Well developed, well nourished  Eyes:  Conjunctivae/corneas clear. PERRL.  Lungs: Normal respiratory effort,   clear to auscultation bilaterally   Heart: Regular rate and rhythm, S1, S2 normal, no murmur, rub or herbert.  Abdomen: Soft, non-tender non-distended; bowel sounds normal; no masses,  no organomegaly  Extremities: No cyanosis or clubbing. No edema.    Skin: Skin color, texture, turgor normal. No rashes or lesions  Neurologic: Normal strength and tone. No focal numbness or weakness   Left arm AVF      Laboratory Data:    Recent Labs  Lab 06/29/18  0506   WBC 4.01   RBC 2.68*   HGB 7.3*  7.3*   HCT 22.4*  22.4*   PLT 68*   MCV 84   MCH 27.2   MCHC 32.6       BMP:   Recent Labs  Lab 06/29/18  0506   GLU 89      K 3.9 "   CL 97   CO2 29   BUN 19   CREATININE 4.8*   CALCIUM 8.8   MG 2.0     Lab Results   Component Value Date    CALCIUM 8.8 06/29/2018    PHOS 4.9 (H) 06/29/2018       Lab Results   Component Value Date    PTH 1,771.8 (H) 06/28/2018    CALCIUM 8.8 06/29/2018    CAION 0.97 (L) 01/26/2018    PHOS 4.9 (H) 06/29/2018       Lab Results   Component Value Date    URICACID 4.7 05/16/2018       BNP  No results for input(s): BNP, BNPTRIAGEBLO in the last 168 hours.    Medications:  Medication list was reviewed and changes noted under Assessment/Plan.    Diagnostic Results:        ASSESSMENT/PLAN:     1. End-stage renal disease on hemodialysis Tuesday Thursday and Saturdays secondary to hypertensive nephrosclerosis (N 18.6, Z 99.2, I 12.0): Extensive renal history and formerly on home dialysis 4 times a week but changed to outpatient unit 3 times a week with main [place] nephrology Dr. Bass.  Consent signed for routine dialysis and tolerated 3LUF .  Started ACE inhibitor. Renally dose meds, avoid nephrotoxins, and monitor I/O's closely.  2. Accelerated hypertension secondary to noncompliant medication regimen plus suboptimal regimen (I 12.0, Z 91.14):  See MAR.  Started ACE inhibitor, continue with clonidine patch, increased hydralazine, Procardia, labetalol.  May consider changing labetalol to metoprolol or Coreg.  Ultrafiltration on hemodialysis.  We'll follow trends and adjust medication regimen as needed but much improved so far.  3. Anemia of chronic kidney disease and acute blood loss anemia from vaginal bleeding (D 63.1, N93.9):  Held off on Epogen as she was having accelerated HTN.  May need transfusion if hemoglobin drops below 7.  Can resume epo as outpt.  4. Status post liver transplant (Z 94.4): Continue antirejection regimen but noncompliance as evidenced by undetectable prograf level.  Defer.  5. HPTH (N25.81):  increased sensipar and start low dose calcitriol.           Discussed with team.  Ok to DC from  Renal.  F/U with Dr. Bass 1-2 weeks.

## 2018-06-29 NOTE — HOSPITAL COURSE
Admitted with Hypertensive emergency suspect secondary to noncompliant medication regimen placed on Nicardipine drip, weaned, home medications restarted. Renal recommendations ACE inhibitor added, continued with clonidine patch, increased hydralazine, Procardia, labetalol. Ultrafiltration on hemodialysis. BP improved. Seen and evaluated by OB/GYN s/p Monsel's and vaginal packing performed at bedside in the ED. Packing without signs of saturation this AM.  Uncontrolled BP most certainly contributing to patient's persistent vaginal bleeding. Do not feel surgical intervention is needed at this time. Follow up with OB/GYN. Clinically improved, vitals stable, discharged home.     Discussed with patient and mother regarding new BP med, Lisinopril. Medication use and side effects explained. Instructed to cease taking if she experiences any symptoms and go to ER. All listed on discharge paperwork. Both patient and mother understand and agree with plan.

## 2018-06-29 NOTE — PLAN OF CARE
06/29/18 1639   Final Note   Assessment Type Final Discharge Note   Discharge Disposition Home   Hospital Follow Up  Appt(s) scheduled? Yes   Discharge plans and expectations educations in teach back method with documentation complete? Yes   Discharge/Hospital Encounter Summary to (non-[facility]) PCP Yes   Referral to / orders for Home Health Complete? Yes   Did you assess the readiness or willingness of the family or caregiver to support self management of care? Yes   Right Care Referral Info   Post Acute Recommendation No Care   Referral Type See AVS

## 2018-06-29 NOTE — PLAN OF CARE
06/29/18 0930   Medicare Message   Important Message from Medicare regarding Discharge Appeal Rights Given to patient/caregiver;Explained to patient/caregiver;Signed/date by patient/caregiver   Date IMM was signed 06/29/18   Time IMM was signed 0930

## 2018-06-29 NOTE — PHARMACY MED REC
"Admission Medication Reconciliation - Pharmacy Consult Note    The home medication history was taken by Alma Rosa Shirley.  Based on information gathered and subsequent review by the clinical pharmacist, the items below may need attention.    You may go to "Admission" then "Reconcile Home Medications" tabs to review and/or act upon these items.    No issues noted with the medication reconciliation.      Prescriptions Prior to Admission   Medication Sig Dispense Refill Last Dose    acetaminophen-codeine 300-30mg (TYLENOL #3) 300-30 mg Tab Take 1 tablet by mouth every 6 (six) hours as needed. 10 tablet 0 Taking    aspirin 81 MG Chew Take 1 tablet (81 mg total) by mouth once daily.  0 6/27/2018    cinacalcet (SENSIPAR) 30 MG Tab Take 1 tablet (30 mg total) by mouth daily with breakfast. (Patient taking differently: Take 30 mg by mouth. On Tuesday, Thursday, and Saturday with dialysis) 30 tablet 11 6/27/2018    citalopram (CELEXA) 20 MG tablet TAKE 1 TABLET BY MOUTH EVERY DAY 30 tablet 1 6/27/2018    cloNIDine 0.3 mg/24 hr td ptwk (CATAPRES) 0.3 mg/24 hr Place 1 patch onto the skin every 7 days. 4 patch 11 6/27/2018    famotidine (PEPCID) 40 MG tablet Take 1 tablet (40 mg total) by mouth once daily. 30 tablet 11 Taking    hydrALAZINE (APRESOLINE) 50 MG tablet Take 1 tablet (50 mg total) by mouth every 8 (eight) hours. (Patient taking differently: Take 50 mg by mouth 2 (two) times daily. ) 90 tablet 5 6/27/2018    labetalol (NORMODYNE) 200 MG tablet Take 2 tablets (400 mg total) by mouth every 8 (eight) hours. (Patient taking differently: Take 400 mg by mouth every 12 (twelve) hours. ) 360 tablet 11 6/27/2018    mycophenolate (CELLCEPT) 250 mg Cap Take 1,000 mg by mouth 2 (two) times daily.   Taking    NIFEdipine (PROCARDIA-XL) 90 MG (OSM) 24 hr tablet Take 1 tablet (90 mg total) by mouth once daily. 30 tablet 11 6/27/2018    predniSONE (DELTASONE) 1 MG tablet Take 1 mg by mouth every other day.   6/27/2018    " tacrolimus (PROGRAF) 1 MG Cap Take 7 capsules (7 mg total) by mouth every 12 (twelve) hours. 420 capsule 11 6/27/2018    triamcinolone acetonide 0.1% (KENALOG) 0.1 % ointment AAA on arms, legs, and neck bid x 1-2 wks then prn flares only (Patient taking differently: Apply to affected area(s) on arms, legs, and neck twice daily x 1-2 wks then as needed for flares only) 80 g 3 Taking    food supplemt, lactose-reduced (ENSURE ACTIVE HIGH PROTEIN) Liqd Take 236 mLs by mouth 2 (two) times daily. 60 Can 6 Taking    loratadine (CLARITIN) 10 mg tablet Take 1 tablet (10 mg total) by mouth once daily. 30 tablet 0 Taking    montelukast (SINGULAIR) 10 mg tablet Take 1 tablet (10 mg total) by mouth every evening. 30 tablet 0 Taking    ondansetron (ZOFRAN) 4 MG tablet Take 1 tablet (4 mg total) by mouth every 6 (six) hours as needed for Nausea. (Patient taking differently: Take 4 mg by mouth once daily. ) 15 tablet 0 Taking       Please address this information as you see fit.  Feel free to contact us if you have any questions or require assistance.    Angela Llamas, Pharm.D., BCPS  [phone]

## 2018-06-29 NOTE — ASSESSMENT & PLAN NOTE
- s/p LEEP 6/15/18   - seen by GYN in ED, and packing placed into vaginal canal  - H&H not requiring transfusion at present   - Vaginal bleeding again stable this AM  - follow up outpt

## 2018-06-29 NOTE — ASSESSMENT & PLAN NOTE
- historically difficult to control BP   - removed and did not replace clonidine patch day of admission  - started on Nicardipine drip in ED, weaned off   - nephrology started ACE inhibitor, continue with clonidine patch, increased hydralazine, Procardia, labetalol. - - Ultrafiltation on hemodialysis  - improved

## 2018-06-29 NOTE — PROGRESS NOTES
To bedside to check on patient  Patient resting comfortably in bed, states she is feeling well. Pain controlled with pain medications. She reports packing remains in place, denies vaginal bleeding, has not required any pads.   She has no complaints.     No evidence of breakthrough bleeding present on vaginal packing  Packing to remain in place until AM rounds  H/h stable,     Recent Labs  Lab 06/22/18  1756 06/27/18  2040 06/28/18  0535 06/28/18  1613 06/28/18  2204   WBC 5.38 3.05* 2.98*  --   --    HGB 8.0* 8.0* 7.2*  7.2* 7.6* 7.3*   HCT 23.6* 24.5* 22.4*  22.4* 22.7* 22.3*   MCV 82 85 84  --   --    PLT 97* 70* 63*  --   --    Cont to trend q6    Will f/u with patient in AM      Susan Pulliam MD  Ob/Gyn PGY-2

## 2018-06-29 NOTE — PROGRESS NOTES
[facility]  Obstetrics & Gynecology  Progress Note    Patient Name: Holly Patel  MRN: [id number]  Admission Date: 2018  Primary Care Provider: Stan Sosa MD  Principal Problem: Hypertensive emergency    Subjective:     HPI:  Holly Patel is a 27 y.o. [id number] with PMH PMH of HTN, ESRD on dialysis (//), anemia, s/p liver transplant as baby on chronic immunosuppressants, chronic anemia, and HTN who presents with complaints of vaginal bleeding.   Patient is s/p LEEP per Dr. Marroquin on 6/15/18.  Postoperative course was complicated by vaginal bleeding requiring EUA with additional cervical sutures for hemostasis performed on . Patient was discharged from hospital on .  After this discharge, patient was admitted to ICU at [facility] for hypertensive emergency and required nicardipine drip.  She was discharged home with several blood pressure medications, one being a clonidine patch.  She states she took off her clonidine patch earlier this AM and she did not replace the patch because she was not home.  She denies HA, visual changes, or weakness.  She denies CP.   Patient also reports vaginal bleeding since being discharged. She reports requiring two pads at a time and having to change the pads every 2-3 hours. She reports the pads are saturated.  She is ambulating without dizziness, lightheadedness, or weakness.  She denies SOB or CP.  She has no symptoms of anemia.        Interval History: Patient states that she is doing well. She denies pain or signs/symptoms of anemia.  States she has not noted any vaginal bleeding since packing was placed.      Scheduled Meds:   cinacalcet  30 mg Oral Daily with breakfast    citalopram  20 mg Oral Daily    cloNIDine 0.3 mg/24 hr td ptwk  1 patch Transdermal Q7 Days    famotidine  40 mg Oral Daily    heparin (porcine)  5,000 Units Subcutaneous Q8H    hydrALAZINE  100 mg Oral BID    labetalol  400 mg Oral Q12H     lisinopril  40 mg Oral Daily    mycophenolate  1,000 mg Oral BID    NIFEdipine  90 mg Oral Daily    predniSONE  1 mg Oral Every other day    tacrolimus  7 mg Oral BID    vitamin renal formula (B-complex-vitamin c-folic acid)  1 capsule Oral Daily     Continuous Infusions:    PRN Meds:lorazepam, ondansetron, oxyCODONE-acetaminophen, oxyCODONE-acetaminophen, sodium chloride 0.9%    Review of patient's allergies indicates:   Allergen Reactions    Chloral hydrate      Other reaction(s): Hallucinations  Other reaction(s): Hives    Hydrocodone Other (See Comments)     Mental status changes       Objective:     Vital Signs (Most Recent):  Temp: 96.9 °F (36.1 °C) (06/29/18 0448)  Pulse: 85 (06/29/18 0448)  Resp: 18 (06/28/18 2002)  BP: (!) 171/86 (06/29/18 0448)  SpO2: 100 % (06/29/18 0448) Vital Signs (24h Range):  Temp:  [96.9 °F (36.1 °C)-98.8 °F (37.1 °C)] 96.9 °F (36.1 °C)  Pulse:  [80-92] 85  Resp:  [18-20] 18  SpO2:  [98 %-100 %] 100 %  BP: (131-207)/() 171/86     Weight: 53.1 kg (117 lb 1 oz)  Body mass index is 19.48 kg/m².  Patient's last menstrual period was 05/30/2018 (exact date).    I&O (Last 24H):    Intake/Output Summary (Last 24 hours) at 06/29/18 0722  Last data filed at 06/28/18 1700   Gross per 24 hour   Intake              500 ml   Output             3500 ml   Net            -3000 ml       Physical Exam:   Constitutional: She is oriented to person, place, and time. She appears well-developed and well-nourished. No distress.   Patient appears to be in no acute distress.     HENT:   Head: Normocephalic and atraumatic.    Eyes: Conjunctivae are normal.    Neck: Neck supple.    Cardiovascular: Normal rate.     Pulmonary/Chest: Effort normal. No respiratory distress.        Abdominal: Soft. She exhibits no distension. There is no tenderness. There is no rebound and no guarding.     Genitourinary: Vagina normal.   Genitourinary Comments: Vaginal packing removed. No bleeding noted after  removal of packing. Packing not saturated.                 Neurological: She is alert and oriented to person, place, and time.    Skin: Skin is warm and dry. She is not diaphoretic.    Psychiatric: She has a normal mood and affect. Her behavior is normal. Judgment and thought content normal.       Laboratory:    Recent Results (from the past 336 hour(s))   CBC with Automated Differential    Collection Time: 06/29/18  5:06 AM   Result Value Ref Range    WBC 4.01 3.90 - 12.70 K/uL    Hemoglobin 7.3 (L) 12.0 - 16.0 g/dL    Hematocrit 22.4 (L) 37.0 - 48.5 %    Platelets 68 (L) 150 - 350 K/uL   CBC with Automated Differential    Collection Time: 06/28/18  5:35 AM   Result Value Ref Range    WBC 2.98 (L) 3.90 - 12.70 K/uL    Hemoglobin 7.2 (L) 12.0 - 16.0 g/dL    Hematocrit 22.4 (L) 37.0 - 48.5 %    Platelets 63 (L) 150 - 350 K/uL   CBC auto differential    Collection Time: 06/27/18  8:40 PM   Result Value Ref Range    WBC 3.05 (L) 3.90 - 12.70 K/uL    Hemoglobin 8.0 (L) 12.0 - 16.0 g/dL    Hematocrit 24.5 (L) 37.0 - 48.5 %    Platelets 70 (L) 150 - 350 K/uL       I have personallly reviewed all pertinent lab results from the last 24 hours.    Assessment/Plan:     Vaginal bleeding    - 6/15- with complicated post op course   - 6/19-21- Admitted for EUA with hemostatic cervical sutures for postop vaginal bleeding  - 6/22-23- Admitted to [facility] ICU for Hypertensive emergency  - Presented today with continued vaginal bleeding  - CBC stable 8.0/24.5 from 8.0/23.6 5 days prior > 7.2/22.4 > 7.6/22 > 7.3/22. No Transfusion indicated at this time. Will D/C CBC q 6 as patient is no longer bleeding.   - S/P Monsel's and vaginal packing performed at bedside in the ED. Packing without signs of saturation this AM.    - Uncontrolled BP most certainly contributing to patient's persistent vaginal bleeding.   - Patient placed on med/surg for hypertensive emergency and continued on nicardipine drip per medicine team. Awaiting bed and  placement  - Vaginal bleeding again stable this AM. Following pad counts.   - Consider EUA if BP is able to be controlled and patient continues to have significant vaginal bleeding. Again, do not feel surgical intervention is needed at this time.  Will continue to follow bleeding and patient's clinical status.             Benedict Roblero MD  Obstetrics & Gynecology  [facility]

## 2018-06-29 NOTE — DISCHARGE INSTRUCTIONS
Hydralazine tablets  What is this medicine?  HYDRALAZINE (maeve ba) is a type of vasodilator. It relaxes blood vessels, increasing the blood and oxygen supply to your heart. This medicine is used to treat high blood pressure.  How should I use this medicine?  Take this medicine by mouth with a glass of water. Follow the directions on the prescription label. Take your doses at regular intervals. Do not take your medicine more often than directed. Do not stop taking except on the advice of your doctor or health care professional.  Talk to your pediatrician regarding the use of this medicine in children. Special care may be needed. While this drug may be prescribed for children for selected conditions, precautions do apply.  What side effects may I notice from receiving this medicine?  Side effects that you should report to your doctor or health care professional as soon as possible:  · chest pain, or fast or irregular heartbeat  · fever, chills, or sore throat  · numbness or tingling in the hands or feet  · shortness of breath  · skin rash, redness, blisters or itching  · stiff or swollen joints  · sudden weight gain  · swelling of the feet or legs  · swollen lymph glands  · unusual weakness  Side effects that usually do not require medical attention (report to your doctor or health care professional if they continue or are bothersome):  · diarrhea, or constipation  · headache  · loss of appetite  · nausea, vomiting  What may interact with this medicine?  · medicines for high blood pressure  · medicines for mental depression  What if I miss a dose?  If you miss a dose, take it as soon as you can. If it is almost time for your next dose, take only that dose. Do not take double or extra doses.  Where should I keep my medicine?  Keep out of the reach of children.  Store at room temperature between 15 and 30 degrees C (59 and 86 degrees F). Throw away any unused medicine after the expiration date.  What should I  tell my health care provider before I take this medicine?  They need to know if you have any of these conditions:  · blood vessel disease  · heart disease including angina or history of heart attack  · kidney or liver disease  · systemic lupus erythematosus (SLE)  · an unusual or allergic reaction to hydralazine, tartrazine dye, other medicines, foods, dyes, or preservatives  · pregnant or trying to get pregnant  · breast-feeding  What should I watch for while using this medicine?  Visit your doctor or health care professional for regular checks on your progress. Check your blood pressure and pulse rate regularly. Ask your doctor or health care professional what your blood pressure and pulse rate should be and when you should contact him or her.  You may get drowsy or dizzy. Do not drive, use machinery, or do anything that needs mental alertness until you know how this medicine affects you. Do not stand or sit up quickly, especially if you are an older patient. This reduces the risk of dizzy or fainting spells. Alcohol may interfere with the effect of this medicine. Avoid alcoholic drinks.  Do not treat yourself for coughs, colds, or pain while you are taking this medicine without asking your doctor or health care professional for advice. Some ingredients may increase your blood pressure.  NOTE:This sheet is a summary. It may not cover all possible information. If you have questions about this medicine, talk to your doctor, pharmacist, or health care provider. Copyright© 2017 [employer]        Lisinopril tablets  What is this medicine?  LISINOPRIL (lyse IN oh pril) is an ACE inhibitor. This medicine is used to treat high blood pressure and heart failure. It is also used to protect the heart immediately after a heart attack.  How should I use this medicine?  Take this medicine by mouth with a glass of water. Follow the directions on your prescription label. You may take this medicine with or without food. If it  upsets your stomach, take it with food. Take your medicine at regular intervals. Do not take it more often than directed. Do not stop taking except on your doctor's advice.  Talk to your pediatrician regarding the use of this medicine in children. Special care may be needed. While this drug may be prescribed for children as young as 6 years of age for selected conditions, precautions do apply.  What side effects may I notice from receiving this medicine?  Side effects that you should report to your doctor or health care professional as soon as possible:  · allergic reactions like skin rash, itching or hives, swelling of the hands, feet, face, lips, throat, or tongue  · breathing problems  · signs and symptoms of kidney injury like trouble passing urine or change in the amount of urine  · signs and symptoms of increased potassium like muscle weakness; chest pain; or fast, irregular heartbeat  · signs and symptoms of liver injury like dark yellow or brown urine; general ill feeling or flu-like symptoms; light-colored stools; loss of appetite; nausea; right upper belly pain; unusually weak or tired; yellowing of the eyes or skin  · signs and symptoms of low blood pressure like dizziness; feeling faint or lightheaded, falls; unusually weak or tired  · stomach pain with or without nausea and vomiting  Side effects that usually do not require medical attention (report to your doctor or health care professional if they continue or are bothersome):  · changes in taste  · cough  · dizziness  · fever  · headache  · sensitivity to light  What may interact with this medicine?  Do not take this medicine with any of the following medications:  · hymenoptera venomThis medicines may also interact with the following medications:  · aliskiren  · angiotensin receptor blockers, like losartan or valsartan  · certain medicines for diabetes  · diuretics  · everolimus  · gold compounds  · lithium  · NSAIDs, medicines for pain and  inflammation, like ibuprofen or naproxen  · potassium salts or supplements  · salt substitutes  · sirolimus  · temsirolimus  What if I miss a dose?  If you miss a dose, take it as soon as you can. If it is almost time for your next dose, take only that dose. Do not take double or extra doses.  Where should I keep my medicine?  Keep out of the reach of children.  Store at room temperature between 15 and 30 degrees C (59 and 86 degrees F). Protect from moisture. Keep container tightly closed. Throw away any unused medicine after the expiration date.  What should I tell my health care provider before I take this medicine?  They need to know if you have any of these conditions:  · diabetes  · heart or blood vessel disease  · kidney disease  · low blood pressure  · previous swelling of the tongue, face, or lips with difficulty breathing, difficulty swallowing, hoarseness, or tightening of the throat  · an unusual or allergic reaction to lisinopril, other ACE inhibitors, insect venom, foods, dyes, or preservatives  · pregnant or trying to get pregnant  · breast-feeding  What should I watch for while using this medicine?  Visit your doctor or health care professional for regular check ups. Check your blood pressure as directed. Ask your doctor what your blood pressure should be, and when you should contact him or her.  Do not treat yourself for coughs, colds, or pain while you are using this medicine without asking your doctor or health care professional for advice. Some ingredients may increase your blood pressure.  Women should inform their doctor if they wish to become pregnant or think they might be pregnant. There is a potential for serious side effects to an unborn child. Talk to your health care professional or pharmacist for more information.  Check with your doctor or health care professional if you get an attack of severe diarrhea, nausea and vomiting, or if you sweat a lot. The loss of too much body fluid can  make it dangerous for you to take this medicine.  You may get drowsy or dizzy. Do not drive, use machinery, or do anything that needs mental alertness until you know how this drug affects you. Do not stand or sit up quickly, especially if you are an older patient. This reduces the risk of dizzy or fainting spells. Alcohol can make you more drowsy and dizzy. Avoid alcoholic drinks.  Avoid salt substitutes unless you are told otherwise by your doctor or health care professional.  NOTE:This sheet is a summary. It may not cover all possible information. If you have questions about this medicine, talk to your doctor, pharmacist, or health care provider. Copyright© 2017 [employer]

## 2018-07-02 ENCOUNTER — OFFICE VISIT (OUTPATIENT)
Dept: NEUROLOGY | Facility: CLINIC | Age: 28
End: 2018-07-02
Payer: MEDICARE

## 2018-07-02 VITALS
WEIGHT: 114.63 LBS | HEART RATE: 79 BPM | SYSTOLIC BLOOD PRESSURE: 159 MMHG | DIASTOLIC BLOOD PRESSURE: 87 MMHG | BODY MASS INDEX: 19.1 KG/M2 | HEIGHT: 65 IN

## 2018-07-02 DIAGNOSIS — R56.9 SEIZURES: Primary | ICD-10-CM

## 2018-07-02 PROCEDURE — 99213 OFFICE O/P EST LOW 20 MIN: CPT | Mod: PBBFAC | Performed by: NEUROLOGICAL SURGERY

## 2018-07-02 PROCEDURE — 99214 OFFICE O/P EST MOD 30 MIN: CPT | Mod: S$PBB,,, | Performed by: NEUROLOGICAL SURGERY

## 2018-07-02 PROCEDURE — 99999 PR PBB SHADOW E&M-EST. PATIENT-LVL III: CPT | Mod: PBBFAC,,, | Performed by: NEUROLOGICAL SURGERY

## 2018-07-06 ENCOUNTER — HOSPITAL ENCOUNTER (INPATIENT)
Facility: HOSPITAL | Age: 28
LOS: 2 days | Discharge: HOME | DRG: 919 | End: 2018-07-08
Attending: EMERGENCY MEDICINE | Admitting: HOSPITALIST
Payer: MEDICARE

## 2018-07-06 DIAGNOSIS — N18.6 ESRD (END STAGE RENAL DISEASE) ON DIALYSIS: ICD-10-CM

## 2018-07-06 DIAGNOSIS — R53.1 WEAKNESS: ICD-10-CM

## 2018-07-06 DIAGNOSIS — Z99.2 ESRD (END STAGE RENAL DISEASE) ON DIALYSIS: ICD-10-CM

## 2018-07-06 DIAGNOSIS — D64.9 ANEMIA, UNSPECIFIED TYPE: Primary | ICD-10-CM

## 2018-07-06 LAB
ABO + RH BLD: NORMAL
ALBUMIN SERPL BCP-MCNC: 2.7 G/DL
ALP SERPL-CCNC: 588 U/L
ALT SERPL W/O P-5'-P-CCNC: 32 U/L
ANION GAP SERPL CALC-SCNC: 11 MMOL/L
ANISOCYTOSIS BLD QL SMEAR: SLIGHT
AST SERPL-CCNC: 56 U/L
BASOPHILS # BLD AUTO: 0 K/UL
BASOPHILS NFR BLD: 0 %
BILIRUB SERPL-MCNC: 0.6 MG/DL
BLD GP AB SCN CELLS X3 SERPL QL: NORMAL
BLD PROD TYP BPU: NORMAL
BLOOD UNIT EXPIRATION DATE: NORMAL
BLOOD UNIT TYPE CODE: 7300
BLOOD UNIT TYPE: NORMAL
BUN SERPL-MCNC: 26 MG/DL
CALCIUM SERPL-MCNC: 8.1 MG/DL
CHLORIDE SERPL-SCNC: 101 MMOL/L
CO2 SERPL-SCNC: 28 MMOL/L
CODING SYSTEM: NORMAL
CREAT SERPL-MCNC: 5.5 MG/DL
DIFFERENTIAL METHOD: ABNORMAL
DISPENSE STATUS: NORMAL
EOSINOPHIL # BLD AUTO: 0.2 K/UL
EOSINOPHIL NFR BLD: 9.6 %
ERYTHROCYTE [DISTWIDTH] IN BLOOD BY AUTOMATED COUNT: 16.6 %
EST. GFR  (AFRICAN AMERICAN): 11.4 ML/MIN/1.73 M^2
EST. GFR  (NON AFRICAN AMERICAN): 9.9 ML/MIN/1.73 M^2
GLUCOSE SERPL-MCNC: 100 MG/DL
HCT VFR BLD AUTO: 15.5 %
HGB BLD-MCNC: 5 G/DL
HYPOCHROMIA BLD QL SMEAR: ABNORMAL
IMM GRANULOCYTES # BLD AUTO: 0.01 K/UL
IMM GRANULOCYTES NFR BLD AUTO: 0.4 %
LYMPHOCYTES # BLD AUTO: 0.4 K/UL
LYMPHOCYTES NFR BLD: 18.4 %
MAGNESIUM SERPL-MCNC: 2.5 MG/DL
MCH RBC QN AUTO: 27.9 PG
MCHC RBC AUTO-ENTMCNC: 32.3 G/DL
MCV RBC AUTO: 87 FL
MONOCYTES # BLD AUTO: 0.2 K/UL
MONOCYTES NFR BLD: 7 %
NEUTROPHILS # BLD AUTO: 1.5 K/UL
NEUTROPHILS NFR BLD: 64.6 %
NRBC BLD-RTO: 0 /100 WBC
PHOSPHATE SERPL-MCNC: 2.9 MG/DL
PLATELET # BLD AUTO: 62 K/UL
PLATELET BLD QL SMEAR: ABNORMAL
PMV BLD AUTO: 10.1 FL
POLYCHROMASIA BLD QL SMEAR: ABNORMAL
POTASSIUM SERPL-SCNC: 3.6 MMOL/L
PROT SERPL-MCNC: 7.5 G/DL
RBC # BLD AUTO: 1.79 M/UL
SODIUM SERPL-SCNC: 140 MMOL/L
TRANS ERYTHROCYTES VOL PATIENT: NORMAL ML
WBC # BLD AUTO: 2.28 K/UL

## 2018-07-06 PROCEDURE — P9021 RED BLOOD CELLS UNIT: HCPCS

## 2018-07-06 PROCEDURE — 99285 EMERGENCY DEPT VISIT HI MDM: CPT | Mod: ,,, | Performed by: EMERGENCY MEDICINE

## 2018-07-06 PROCEDURE — 99285 EMERGENCY DEPT VISIT HI MDM: CPT | Mod: 25

## 2018-07-06 PROCEDURE — 80053 COMPREHEN METABOLIC PANEL: CPT

## 2018-07-06 PROCEDURE — 86920 COMPATIBILITY TEST SPIN: CPT

## 2018-07-06 PROCEDURE — 25000003 PHARM REV CODE 250: Performed by: HOSPITALIST

## 2018-07-06 PROCEDURE — 25000003 PHARM REV CODE 250: Performed by: EMERGENCY MEDICINE

## 2018-07-06 PROCEDURE — 86901 BLOOD TYPING SEROLOGIC RH(D): CPT

## 2018-07-06 PROCEDURE — 83735 ASSAY OF MAGNESIUM: CPT

## 2018-07-06 PROCEDURE — 63600175 PHARM REV CODE 636 W HCPCS: Performed by: HOSPITALIST

## 2018-07-06 PROCEDURE — 11000001 HC ACUTE MED/SURG PRIVATE ROOM

## 2018-07-06 PROCEDURE — 25000003 PHARM REV CODE 250: Performed by: PHYSICIAN ASSISTANT

## 2018-07-06 PROCEDURE — 96376 TX/PRO/DX INJ SAME DRUG ADON: CPT

## 2018-07-06 PROCEDURE — 84100 ASSAY OF PHOSPHORUS: CPT

## 2018-07-06 PROCEDURE — 96374 THER/PROPH/DIAG INJ IV PUSH: CPT

## 2018-07-06 PROCEDURE — 93010 ELECTROCARDIOGRAM REPORT: CPT | Mod: ,,, | Performed by: INTERNAL MEDICINE

## 2018-07-06 PROCEDURE — 99223 1ST HOSP IP/OBS HIGH 75: CPT | Mod: AI,,, | Performed by: HOSPITALIST

## 2018-07-06 PROCEDURE — 93005 ELECTROCARDIOGRAM TRACING: CPT

## 2018-07-06 PROCEDURE — 85025 COMPLETE CBC W/AUTO DIFF WBC: CPT

## 2018-07-06 RX ORDER — HYDROCODONE BITARTRATE AND ACETAMINOPHEN 500; 5 MG/1; MG/1
TABLET ORAL
Status: DISCONTINUED | OUTPATIENT
Start: 2018-07-06 | End: 2018-07-08 | Stop reason: HOSPADM

## 2018-07-06 RX ORDER — LABETALOL 100 MG/1
400 TABLET, FILM COATED ORAL EVERY 8 HOURS
Status: DISCONTINUED | OUTPATIENT
Start: 2018-07-06 | End: 2018-07-08 | Stop reason: HOSPADM

## 2018-07-06 RX ORDER — MYCOPHENOLATE MOFETIL 250 MG/1
1000 CAPSULE ORAL 2 TIMES DAILY
Status: DISCONTINUED | OUTPATIENT
Start: 2018-07-06 | End: 2018-07-08 | Stop reason: HOSPADM

## 2018-07-06 RX ORDER — ACETAMINOPHEN 325 MG/1
650 TABLET ORAL EVERY 6 HOURS PRN
Status: DISCONTINUED | OUTPATIENT
Start: 2018-07-06 | End: 2018-07-08 | Stop reason: HOSPADM

## 2018-07-06 RX ORDER — LABETALOL 100 MG/1
400 TABLET, FILM COATED ORAL EVERY 8 HOURS
Status: DISCONTINUED | OUTPATIENT
Start: 2018-07-06 | End: 2018-07-06

## 2018-07-06 RX ORDER — CALCITRIOL 0.25 UG/1
0.25 CAPSULE ORAL
Status: DISCONTINUED | OUTPATIENT
Start: 2018-07-06 | End: 2018-07-08 | Stop reason: HOSPADM

## 2018-07-06 RX ORDER — CITALOPRAM 10 MG/1
20 TABLET ORAL DAILY
Status: DISCONTINUED | OUTPATIENT
Start: 2018-07-07 | End: 2018-07-08 | Stop reason: HOSPADM

## 2018-07-06 RX ORDER — ACETAMINOPHEN 325 MG/1
650 TABLET ORAL EVERY 6 HOURS PRN
Status: DISCONTINUED | OUTPATIENT
Start: 2018-07-06 | End: 2018-07-06

## 2018-07-06 RX ORDER — HYDRALAZINE HYDROCHLORIDE 20 MG/ML
10 INJECTION INTRAMUSCULAR; INTRAVENOUS ONCE
Status: DISCONTINUED | OUTPATIENT
Start: 2018-07-06 | End: 2018-07-06

## 2018-07-06 RX ORDER — MONTELUKAST SODIUM 10 MG/1
10 TABLET ORAL NIGHTLY
Status: DISCONTINUED | OUTPATIENT
Start: 2018-07-06 | End: 2018-07-08 | Stop reason: HOSPADM

## 2018-07-06 RX ORDER — IBUPROFEN 200 MG
24 TABLET ORAL
Status: DISCONTINUED | OUTPATIENT
Start: 2018-07-06 | End: 2018-07-08 | Stop reason: HOSPADM

## 2018-07-06 RX ORDER — NIFEDIPINE 30 MG/1
90 TABLET, EXTENDED RELEASE ORAL DAILY
Status: DISCONTINUED | OUTPATIENT
Start: 2018-07-07 | End: 2018-07-07

## 2018-07-06 RX ORDER — HYDRALAZINE HYDROCHLORIDE 20 MG/ML
10 INJECTION INTRAMUSCULAR; INTRAVENOUS
Status: ACTIVE | OUTPATIENT
Start: 2018-07-06 | End: 2018-07-07

## 2018-07-06 RX ORDER — LABETALOL HYDROCHLORIDE 5 MG/ML
10 INJECTION, SOLUTION INTRAVENOUS
Status: DISCONTINUED | OUTPATIENT
Start: 2018-07-06 | End: 2018-07-06

## 2018-07-06 RX ORDER — HYDRALAZINE HYDROCHLORIDE 50 MG/1
100 TABLET, FILM COATED ORAL EVERY 8 HOURS
Status: DISCONTINUED | OUTPATIENT
Start: 2018-07-06 | End: 2018-07-08 | Stop reason: HOSPADM

## 2018-07-06 RX ORDER — LABETALOL HYDROCHLORIDE 5 MG/ML
10 INJECTION, SOLUTION INTRAVENOUS ONCE
Status: COMPLETED | OUTPATIENT
Start: 2018-07-06 | End: 2018-07-06

## 2018-07-06 RX ORDER — LABETALOL HYDROCHLORIDE 5 MG/ML
10 INJECTION, SOLUTION INTRAVENOUS EVERY 4 HOURS PRN
Status: DISCONTINUED | OUTPATIENT
Start: 2018-07-06 | End: 2018-07-08 | Stop reason: HOSPADM

## 2018-07-06 RX ORDER — IBUPROFEN 200 MG
16 TABLET ORAL
Status: DISCONTINUED | OUTPATIENT
Start: 2018-07-06 | End: 2018-07-08 | Stop reason: HOSPADM

## 2018-07-06 RX ORDER — FAMOTIDINE 20 MG/1
20 TABLET, FILM COATED ORAL DAILY
Status: DISCONTINUED | OUTPATIENT
Start: 2018-07-07 | End: 2018-07-08 | Stop reason: HOSPADM

## 2018-07-06 RX ORDER — HYDRALAZINE HYDROCHLORIDE 25 MG/1
100 TABLET, FILM COATED ORAL
Status: COMPLETED | OUTPATIENT
Start: 2018-07-06 | End: 2018-07-06

## 2018-07-06 RX ORDER — ONDANSETRON 4 MG/1
4 TABLET, FILM COATED ORAL EVERY 6 HOURS PRN
Status: DISCONTINUED | OUTPATIENT
Start: 2018-07-06 | End: 2018-07-08 | Stop reason: HOSPADM

## 2018-07-06 RX ORDER — CINACALCET 30 MG/1
30 TABLET, FILM COATED ORAL
Status: DISCONTINUED | OUTPATIENT
Start: 2018-07-07 | End: 2018-07-08 | Stop reason: HOSPADM

## 2018-07-06 RX ORDER — ACETAMINOPHEN 500 MG
500 TABLET ORAL ONCE
Status: COMPLETED | OUTPATIENT
Start: 2018-07-06 | End: 2018-07-06

## 2018-07-06 RX ORDER — CLONIDINE 0.3 MG/24H
1 PATCH, EXTENDED RELEASE TRANSDERMAL
Status: DISCONTINUED | OUTPATIENT
Start: 2018-07-07 | End: 2018-07-08 | Stop reason: HOSPADM

## 2018-07-06 RX ORDER — GLUCAGON 1 MG
1 KIT INJECTION
Status: DISCONTINUED | OUTPATIENT
Start: 2018-07-06 | End: 2018-07-08 | Stop reason: HOSPADM

## 2018-07-06 RX ORDER — LISINOPRIL 20 MG/1
40 TABLET ORAL DAILY
Status: DISCONTINUED | OUTPATIENT
Start: 2018-07-07 | End: 2018-07-08 | Stop reason: HOSPADM

## 2018-07-06 RX ORDER — SODIUM CHLORIDE 0.9 % (FLUSH) 0.9 %
5 SYRINGE (ML) INJECTION
Status: DISCONTINUED | OUTPATIENT
Start: 2018-07-06 | End: 2018-07-08 | Stop reason: HOSPADM

## 2018-07-06 RX ORDER — HYDRALAZINE HYDROCHLORIDE 20 MG/ML
10 INJECTION INTRAMUSCULAR; INTRAVENOUS ONCE
Status: COMPLETED | OUTPATIENT
Start: 2018-07-06 | End: 2018-07-07

## 2018-07-06 RX ADMIN — LABETALOL HYDROCHLORIDE 10 MG: 5 INJECTION, SOLUTION INTRAVENOUS at 05:07

## 2018-07-06 RX ADMIN — ACETAMINOPHEN 650 MG: 325 TABLET, FILM COATED ORAL at 11:07

## 2018-07-06 RX ADMIN — LABETALOL HCL 400 MG: 100 TABLET, FILM COATED ORAL at 04:07

## 2018-07-06 RX ADMIN — TACROLIMUS 7 MG: 5 CAPSULE ORAL at 07:07

## 2018-07-06 RX ADMIN — ACETAMINOPHEN 500 MG: 500 TABLET ORAL at 09:07

## 2018-07-06 RX ADMIN — HYDRALAZINE HYDROCHLORIDE 100 MG: 25 TABLET, FILM COATED ORAL at 02:07

## 2018-07-06 RX ADMIN — LABETALOL HYDROCHLORIDE 10 MG: 5 INJECTION, SOLUTION INTRAVENOUS at 08:07

## 2018-07-06 NOTE — ED NOTES
Patient reports she is dialysis [facility], and she was called and informed by dialysis that her hemoglobin is 5.3 and she needs a blood transfusion. Denies SOB, but reports some weakness. Reports having a blood transfusion after GYN procedure in June. Reports still having vaginal bleeding, saturates ~3 pads a day.

## 2018-07-06 NOTE — ASSESSMENT & PLAN NOTE
ESRD on iHD TTS  [facility]  Dr. Bass  3.5 hours  50 kg  LFA AVF    Plan/recommendations:  No urgent need for RRT at this time.  Patient appears euvolemic on examination, oxygenating well on RA.  If patient w/o respiratory distress after 1st unit of PRBC, would give 2nd unit of PRBC over 4 hours.  If patient develops SOB overnight, please notify nephrology on call and we will perform urgent HD at the bedside.  If not, will be scheduled for HD in the AM for volume management and metabolic clearance.  MARIA T with dialysis.

## 2018-07-06 NOTE — ED PROVIDER NOTES
Encounter Date: 7/6/2018    SCRIBE #1 NOTE: I, Eladio Pulliam, am scribing for, and in the presence of,  Dr. Ramos. I have scribed the following portions of the note -       History     Chief Complaint   Patient presents with    Abnormal Lab     Time patient was seen by the provider: 12:28 PM      The patient is a 27 y.o. female with co-morbidities including: ESRD, HTN, liver transplant in 2012 who presents to the ED with a complaint of abnormal labs from Tuesday. Pt reports low hemoglobin levels after her full dialysis treatment yesterday. She notes feeling dizzy yesterday however her sx have resolved and she is currently asymptomatic in ED today. Pt w/ recent blood transfusion on Kandis 15, 2018 and one more transfusion that she reports in the past year. She denies fever, cough or cold sx, CP, SOB. No blood in stool. No urinary sx. Pt does note mild vaginal bleeding s/p leap procedure in June which is currently greatly improved (only spotting, not like a normal period).  Last saw her gynecologist last month.             Review of patient's allergies indicates:   Allergen Reactions    Chloral hydrate      Other reaction(s): Hallucinations  Other reaction(s): Hives    Hydrocodone Other (See Comments)     Mental status changes     Past Medical History:   Diagnosis Date    Anemia in ESRD (end-stage renal disease) 10/12/2015    dialysis tues, thursday, sat; access left arm    Chronic rejection of liver transplant 3/22/2016    Depression     Encounter for blood transfusion     ESRD on hemodialysis 9/30/2015    History of recent hospitalization 05/2018    pneumonia    History of splenomegaly 4/12/2016    Immunosuppressed 8/5/2017    Iron deficiency anemia secondary to inadequate dietary iron intake 8/16/2017    [facility]   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)    Moderate protein-calorie malnutrition 8/16/2017    MRSA  bacteremia 2017    Pneumonia     Prophylactic immunotherapy 2014    Renovascular hypertension 10/2/2015    Secondary hyperparathyroidism 2017    Seizures     Sialadenitis 3/21/2018    Thrombocytopenia 2016     Past Surgical History:   Procedure Laterality Date     SECTION      x 2    CONIZATION OF CERVIX USING LOOP ELECTROSURGICAL EXCISION PROCEDURE (LEEP) N/A 6/15/2018    Procedure: CONIZATION-CERVICAL-LEEP;  Surgeon: Neelam Marroquin MD;  Location: [facility] OR;  Service: OB/GYN;  Laterality: N/A;    EXAMINATION UNDER ANESTHESIA N/A 2018    Procedure: Exam under anesthesia;  Surgeon: Neelam Marroquin MD;  Location: [facility] OR;  Service: OB/GYN;  Laterality: N/A;    LIVER BIOPSY      LIVER TRANSPLANT  1992    PERINEORRHAPHY  2018    Procedure: SUTURE REPAIR,CERVIX;  Surgeon: Neelam Marroquin MD;  Location: [facility];  Service: OB/GYN;;    TUBAL LIGATION       Family History   Problem Relation Age of Onset    Hypertension Mother     Hypertension Father     Melanoma Neg Hx     Breast cancer Neg Hx     Colon cancer Neg Hx     Ovarian cancer Neg Hx      Social History   Substance Use Topics    Smoking status: Never Smoker    Smokeless tobacco: Never Used    Alcohol use No     Review of Systems   Constitutional: Negative for chills and fever.        Abnormal labs- low hemoglobin   HENT: Negative for sore throat.    Eyes: Negative for visual disturbance.   Respiratory: Negative for shortness of breath.    Cardiovascular: Negative for chest pain.   Gastrointestinal: Negative for abdominal pain, diarrhea, nausea and vomiting.   Genitourinary:        No urine production   Musculoskeletal: Negative for back pain.   Skin: Negative for rash.   Neurological: Negative for headaches.       Physical Exam     Initial Vitals [18 1203]   BP Pulse Resp Temp SpO2   (!) 193/101 96 18 99.1 °F (37.3 °C) 100 %      MAP       --         Physical Exam    Constitutional:  She appears well-developed and well-nourished. She is not diaphoretic. No distress.   HENT:   Head: Normocephalic and atraumatic.   Right Ear: External ear normal.   Left Ear: External ear normal.   Eyes: Conjunctivae are normal. Pupils are equal, round, and reactive to light. Right eye exhibits no discharge. Left eye exhibits no discharge. No scleral icterus.   Neck: Normal range of motion. Neck supple. No JVD present.   Cardiovascular: Normal rate and regular rhythm. Exam reveals no gallop.    Murmur heard.  Pulmonary/Chest: Breath sounds normal. No stridor. No respiratory distress. She has no wheezes. She has no rhonchi. She has no rales.   Abdominal: Soft. Bowel sounds are normal. She exhibits no distension. There is no tenderness.   Musculoskeletal: Normal range of motion. She exhibits no edema or tenderness.   L forearm HD access w/o complication   Neurological: She is alert and oriented to person, place, and time. She has normal strength. No sensory deficit.   Skin: Skin is warm and dry. No rash noted. No erythema.   Psychiatric: She has a normal mood and affect.         ED Course   Procedures  Labs Reviewed   CBC W/ AUTO DIFFERENTIAL - Abnormal; Notable for the following:        Result Value    WBC 2.28 (*)     RBC 1.79 (*)     Hemoglobin 5.0 (*)     Hematocrit 15.5 (*)     RDW 16.6 (*)     Platelets 62 (*)     Gran # (ANC) 1.5 (*)     Lymph # 0.4 (*)     Mono # 0.2 (*)     Eosinophil% 9.6 (*)     Platelet Estimate Decreased (*)     All other components within normal limits    Narrative:      hct and hgb  critical result(s) called and verbal readback obtained   from CANDIE SWANSON RN, 07/06/2018 13:04   COMPREHENSIVE METABOLIC PANEL - Abnormal; Notable for the following:     BUN, Bld 26 (*)     Creatinine 5.5 (*)     Calcium 8.1 (*)     Albumin 2.7 (*)     Alkaline Phosphatase 588 (*)     AST 56 (*)     eGFR if  11.4 (*)     eGFR if non  9.9 (*)     All other components  within normal limits   MAGNESIUM   PHOSPHORUS   TYPE & SCREEN   PREPARE RBC SOFT          Imaging Results    None          Medical Decision Making:   History:   Old Medical Records: I decided to obtain old medical records.  Initial Assessment:   Symptomatic anemia in HD patient, labs drawn Tues.   Will confirm hgb level and if is in fact that low (baseline appears to be 7.3), will admit for transfusion and HD (given the volume load from the transfusion).  Reports only mild spotting at this point, but was heavier last month - suspect this is the cause of her anemia.    1:57 PM labs as above, pt consented for transfusion.  bp elevated, states typically in this range - no cp/sob at present.    Case d/w [facility] medicine and assigned to Dr Almanza.  Pt updated on plans for transfusion/admission and is agreeable.    Independently Interpreted Test(s):   I have ordered and independently interpreted EKG Reading(s) - see prior notes  Clinical Tests:   Lab Tests: Ordered and Reviewed  Medical Tests: Ordered and Reviewed            Scribe Attestation:   Scribe #1: I performed the above scribed service and the documentation accurately describes the services I performed. I attest to the accuracy of the note.               Clinical Impression:   The primary encounter diagnosis was Anemia, unspecified type. Diagnoses of Weakness and ESRD (end stage renal disease) on dialysis were also pertinent to this visit.                             Philip Ramos MD  07/06/18 1400

## 2018-07-06 NOTE — CONSULTS
[facility]  Nephrology  Consult Note    Patient Name: Holly Patel  MRN: [id number]  Admission Date: 7/6/2018  Hospital Length of Stay: 0 days  Attending Provider: Philip Ni MD   Primary Care Physician: Stan Sosa MD  Principal Problem:<principal problem not specified>    Inpatient consult to Nephrology  Consult performed by: OLIVE BLUE  Consult ordered by: PHILIP NI  Reason for consult: ESRD        Subjective:     HPI: Ms. Patel is a 26 yo AAF with ESRD, HTN, liver transplant in 2012 who presents to the hospital on 07/06 for evaluation of acute blood loss anemia found on labs collected at her dialysis unit on Tuesday.  She reports having a LEEP procedure on June 16th and reported heavy bleeding post procedure but has since decreased in volume, now only reporting spotting.  She denies any decreased trends with her dialysis labs.  She denies SOB or CP, but does endorse dizziness with position changes.  ED labs revealed a Hgb of 5.0 and noted to be hypertensive w/o signs of hypovolemic shock.  No major electrolyte abnormalities noted on renal panel.    She dialyzes at [facility] under the care of Dr. Bass.  She dialyzes for 3.5 hours and reports an EDW ~ 50 kg.  She has an AVF to her LFA.  She no longer makes urine.      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)    Moderate  protein-calorie malnutrition 2017    MRSA bacteremia 2017    Pneumonia     Prophylactic immunotherapy 2014    Renovascular hypertension 10/2/2015    Secondary hyperparathyroidism 2017    Seizures     Sialadenitis 3/21/2018    Thrombocytopenia 2016       Past Surgical History:   Procedure Laterality Date     SECTION      x 2    CONIZATION OF CERVIX USING LOOP ELECTROSURGICAL EXCISION PROCEDURE (LEEP) N/A 6/15/2018    Procedure: CONIZATION-CERVICAL-LEEP;  Surgeon: Neealm Marroquin MD;  Location: [facility] OR;  Service: OB/GYN;  Laterality: N/A;    EXAMINATION UNDER ANESTHESIA N/A 2018    Procedure: Exam under anesthesia;  Surgeon: Neelam Marroquin MD;  Location: [facility] OR;  Service: OB/GYN;  Laterality: N/A;    LIVER BIOPSY      LIVER TRANSPLANT  1992    PERINEORRHAPHY  2018    Procedure: SUTURE REPAIR,CERVIX;  Surgeon: Neelam Marroquin MD;  Location: [facility];  Service: OB/GYN;;    TUBAL LIGATION         Review of patient's allergies indicates:   Allergen Reactions    Chloral hydrate      Other reaction(s): Hallucinations  Other reaction(s): Hives    Hydrocodone Other (See Comments)     Mental status changes     Current Facility-Administered Medications   Medication Frequency    0.9%  NaCl infusion (for blood administration) Q24H PRN    calcitRIOL capsule 0.25 mcg Every Mon, Wed, Fri    [START ON 2018] cinacalcet tablet 30 mg Daily with breakfast    [START ON 2018] citalopram tablet 20 mg Daily    [START ON 2018] cloNIDine 0.3 mg/24 hr td ptwk 1 patch Q7 Days    dextrose 50% injection 12.5 g PRN    dextrose 50% injection 25 g PRN    [START ON 2018] famotidine tablet 20 mg Daily    glucagon (human recombinant) injection 1 mg PRN    glucose chewable tablet 16 g PRN    glucose chewable tablet 24 g PRN    hydrALAZINE tablet 100 mg Q8H    labetalol tablet 400 mg Q8H    [START ON 2018] lisinopril tablet 40 mg Daily     montelukast tablet 10 mg QHS    mycophenolate capsule 1,000 mg BID    [START ON 7/7/2018] NIFEdipine 24 hr tablet 90 mg Daily    ondansetron tablet 4 mg Q6H PRN    sodium chloride 0.9% flush 5 mL PRN    tacrolimus capsule 7 mg BID    [START ON 7/7/2018] vitamin renal formula (B-complex-vitamin c-folic acid) 1 mg per capsule 1 capsule Daily     Current Outpatient Prescriptions   Medication    acetaminophen-codeine 300-30mg (TYLENOL #3) 300-30 mg Tab    aspirin 81 MG Chew    calcitRIOL (ROCALTROL) 0.25 MCG Cap    cinacalcet (SENSIPAR) 30 MG Tab    citalopram (CELEXA) 20 MG tablet    cloNIDine 0.3 mg/24 hr td ptwk (CATAPRES) 0.3 mg/24 hr    famotidine (PEPCID) 40 MG tablet    hydrALAZINE (APRESOLINE) 100 MG tablet    labetalol (NORMODYNE) 200 MG tablet    lisinopril (PRINIVIL,ZESTRIL) 40 MG tablet    loratadine (CLARITIN) 10 mg tablet    montelukast (SINGULAIR) 10 mg tablet    NIFEdipine (PROCARDIA-XL) 90 MG (OSM) 24 hr tablet    predniSONE (DELTASONE) 1 MG tablet    tacrolimus (PROGRAF) 1 MG Cap    vitamin renal formula, B-complex-vitamin c-folic acid, (NEPHROCAP) 1 mg Cap    food supplemt, lactose-reduced (ENSURE ACTIVE HIGH PROTEIN) Liqd    mycophenolate (CELLCEPT) 250 mg Cap    ondansetron (ZOFRAN) 4 MG tablet    triamcinolone acetonide 0.1% (KENALOG) 0.1 % ointment     Family History     Problem Relation (Age of Onset)    Hypertension Mother, Father        Social History Main Topics    Smoking status: Never Smoker    Smokeless tobacco: Never Used    Alcohol use No    Drug use: No    Sexual activity: No     Review of Systems   Constitutional: Negative for appetite change, chills, diaphoresis and fever.   HENT: Negative for congestion, ear pain, postnasal drip, rhinorrhea, sinus pressure, sore throat and trouble swallowing.    Respiratory: Negative for cough, shortness of breath and wheezing.    Cardiovascular: Negative for chest pain and palpitations.   Gastrointestinal: Negative for  abdominal pain, diarrhea, nausea and vomiting.   Genitourinary: Negative for dysuria, flank pain, frequency, hematuria and urgency.   Skin: Negative for color change, pallor, rash and wound.   Neurological: Positive for light-headedness (position changes). Negative for dizziness, weakness and headaches.   Psychiatric/Behavioral: Negative for confusion and decreased concentration.     Objective:     Vital Signs (Most Recent):  Temp: 98.8 °F (37.1 °C) (07/06/18 1415)  Pulse: 94 (07/06/18 1503)  Resp: 18 (07/06/18 1415)  BP: (!) 193/119 (07/06/18 1503)  SpO2: 100 % (07/06/18 1503)  O2 Device (Oxygen Therapy): room air (07/06/18 1415) Vital Signs (24h Range):  Temp:  [98.3 °F (36.8 °C)-99.1 °F (37.3 °C)] 98.8 °F (37.1 °C)  Pulse:  [] 94  Resp:  [18] 18  SpO2:  [100 %] 100 %  BP: (179-197)/(101-126) 193/119     Weight: 52.6 kg (116 lb) (07/06/18 1203)  Body mass index is 19.3 kg/m².  Body surface area is 1.55 meters squared.    No intake/output data recorded.    Physical Exam   Constitutional: She is oriented to person, place, and time. She appears well-developed and well-nourished. No distress.   HENT:   Head: Normocephalic and atraumatic.   Right Ear: External ear normal.   Left Ear: External ear normal.   Eyes: Conjunctivae and EOM are normal. Pupils are equal, round, and reactive to light. Right eye exhibits no discharge. Left eye exhibits no discharge. No scleral icterus.   Neck: Normal range of motion. Neck supple. No tracheal deviation present.   Cardiovascular: Normal rate, regular rhythm, normal heart sounds and intact distal pulses.  Exam reveals no gallop and no friction rub.    No murmur heard.  Pulmonary/Chest: Effort normal and breath sounds normal. No stridor. No respiratory distress. She has no wheezes. She has no rales.   Abdominal: Soft. Bowel sounds are normal. She exhibits no distension and no mass. There is no tenderness. There is no guarding.   Neurological: She is alert and oriented to  person, place, and time.   Skin: Skin is warm and dry. She is not diaphoretic.   Psychiatric: She has a normal mood and affect. Her behavior is normal. Judgment and thought content normal.   Nursing note and vitals reviewed.      Significant Labs:  CBC:   Recent Labs  Lab 07/06/18  1222   WBC 2.28*   RBC 1.79*   HGB 5.0*   HCT 15.5*   PLT 62*   MCV 87   MCH 27.9   MCHC 32.3     CMP:   Recent Labs  Lab 07/06/18  1222      CALCIUM 8.1*   ALBUMIN 2.7*   PROT 7.5      K 3.6   CO2 28      BUN 26*   CREATININE 5.5*   ALKPHOS 588*   ALT 32   AST 56*   BILITOT 0.6           Assessment/Plan:     ESRD (end stage renal disease) on dialysis    ESRD on iHD TTS  [facility]  Dr. Bass  3.5 hours  50 kg  LFA AVF    Plan/recommendations:  No urgent need for RRT at this time.  Patient appears euvolemic on examination, oxygenating well on RA.  If patient w/o respiratory distress after 1st unit of PRBC, would give 2nd unit of PRBC over 4 hours.  If patient develops SOB overnight, please notify nephrology on call and we will perform urgent HD at the bedside.  If not, will be scheduled for HD in the AM for volume management and metabolic clearance.  MARIA T with dialysis.            Raheem Trujillo NP  Nephrology  [facility]  Pager:  [phone]

## 2018-07-06 NOTE — SUBJECTIVE & OBJECTIVE
Past Medical History:   Diagnosis Date    Anemia in ESRD (end-stage renal disease) 10/12/2015    dialysis es, thursday, sat; access left arm    Chronic rejection of liver transplant 3/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 ()    Moderate protein-calorie malnutrition 2017    MRSA bacteremia 2017    Pneumonia     Prophylactic immunotherapy 2014    Renovascular hypertension 10/2/2015    Secondary hyperparathyroidism 2017    Seizures     Sialadenitis 3/21/2018    Thrombocytopenia 2016       Past Surgical History:   Procedure Laterality Date     SECTION      x 2    CONIZATION OF CERVIX USING LOOP ELECTROSURGICAL EXCISION PROCEDURE (LEEP) N/A 6/15/2018    Procedure: CONIZATION-CERVICAL-LEEP;  Surgeon: Neelam Marroquin MD;  Location: [facility];  Service: OB/GYN;  Laterality: N/A;    EXAMINATION UNDER ANESTHESIA N/A 2018    Procedure: Exam under anesthesia;  Surgeon: Neelam Marroquin MD;  Location: [facility] OR;  Service: OB/GYN;  Laterality: N/A;    LIVER BIOPSY      LIVER TRANSPLANT  1992    PERINEORRHAPHY  2018    Procedure: SUTURE REPAIR,CERVIX;  Surgeon: Neelam Marroquin MD;  Location: [facility] OR;  Service: OB/GYN;;    TUBAL LIGATION         Review of patient's allergies indicates:   Allergen Reactions    Chloral hydrate      Other reaction(s): Hallucinations  Other reaction(s): Hives    Hydrocodone Other (See Comments)     Mental status changes     Current Facility-Administered Medications   Medication Frequency    0.9%  NaCl infusion (for blood  administration) Q24H PRN    calcitRIOL capsule 0.25 mcg Every Mon, Wed, Fri    [START ON 7/7/2018] cinacalcet tablet 30 mg Daily with breakfast    [START ON 7/7/2018] citalopram tablet 20 mg Daily    [START ON 7/7/2018] cloNIDine 0.3 mg/24 hr td ptwk 1 patch Q7 Days    dextrose 50% injection 12.5 g PRN    dextrose 50% injection 25 g PRN    [START ON 7/7/2018] famotidine tablet 20 mg Daily    glucagon (human recombinant) injection 1 mg PRN    glucose chewable tablet 16 g PRN    glucose chewable tablet 24 g PRN    hydrALAZINE tablet 100 mg Q8H    labetalol tablet 400 mg Q8H    [START ON 7/7/2018] lisinopril tablet 40 mg Daily    montelukast tablet 10 mg QHS    mycophenolate capsule 1,000 mg BID    [START ON 7/7/2018] NIFEdipine 24 hr tablet 90 mg Daily    ondansetron tablet 4 mg Q6H PRN    sodium chloride 0.9% flush 5 mL PRN    tacrolimus capsule 7 mg BID    [START ON 7/7/2018] vitamin renal formula (B-complex-vitamin c-folic acid) 1 mg per capsule 1 capsule Daily     Current Outpatient Prescriptions   Medication    acetaminophen-codeine 300-30mg (TYLENOL #3) 300-30 mg Tab    aspirin 81 MG Chew    calcitRIOL (ROCALTROL) 0.25 MCG Cap    cinacalcet (SENSIPAR) 30 MG Tab    citalopram (CELEXA) 20 MG tablet    cloNIDine 0.3 mg/24 hr td ptwk (CATAPRES) 0.3 mg/24 hr    famotidine (PEPCID) 40 MG tablet    hydrALAZINE (APRESOLINE) 100 MG tablet    labetalol (NORMODYNE) 200 MG tablet    lisinopril (PRINIVIL,ZESTRIL) 40 MG tablet    loratadine (CLARITIN) 10 mg tablet    montelukast (SINGULAIR) 10 mg tablet    NIFEdipine (PROCARDIA-XL) 90 MG (OSM) 24 hr tablet    predniSONE (DELTASONE) 1 MG tablet    tacrolimus (PROGRAF) 1 MG Cap    vitamin renal formula, B-complex-vitamin c-folic acid, (NEPHROCAP) 1 mg Cap    food supplemt, lactose-reduced (ENSURE ACTIVE HIGH PROTEIN) Liqd    mycophenolate (CELLCEPT) 250 mg Cap    ondansetron (ZOFRAN) 4 MG tablet    triamcinolone acetonide 0.1% (KENALOG)  0.1 % ointment     Family History     Problem Relation (Age of Onset)    Hypertension Mother, Father        Social History Main Topics    Smoking status: Never Smoker    Smokeless tobacco: Never Used    Alcohol use No    Drug use: No    Sexual activity: No     Review of Systems   Constitutional: Negative for appetite change, chills, diaphoresis and fever.   HENT: Negative for congestion, ear pain, postnasal drip, rhinorrhea, sinus pressure, sore throat and trouble swallowing.    Respiratory: Negative for cough, shortness of breath and wheezing.    Cardiovascular: Negative for chest pain and palpitations.   Gastrointestinal: Negative for abdominal pain, diarrhea, nausea and vomiting.   Genitourinary: Negative for dysuria, flank pain, frequency, hematuria and urgency.   Skin: Negative for color change, pallor, rash and wound.   Neurological: Positive for light-headedness (position changes). Negative for dizziness, weakness and headaches.   Psychiatric/Behavioral: Negative for confusion and decreased concentration.     Objective:     Vital Signs (Most Recent):  Temp: 98.8 °F (37.1 °C) (07/06/18 1415)  Pulse: 94 (07/06/18 1503)  Resp: 18 (07/06/18 1415)  BP: (!) 193/119 (07/06/18 1503)  SpO2: 100 % (07/06/18 1503)  O2 Device (Oxygen Therapy): room air (07/06/18 1415) Vital Signs (24h Range):  Temp:  [98.3 °F (36.8 °C)-99.1 °F (37.3 °C)] 98.8 °F (37.1 °C)  Pulse:  [] 94  Resp:  [18] 18  SpO2:  [100 %] 100 %  BP: (179-197)/(101-126) 193/119     Weight: 52.6 kg (116 lb) (07/06/18 1203)  Body mass index is 19.3 kg/m².  Body surface area is 1.55 meters squared.    No intake/output data recorded.    Physical Exam   Constitutional: She is oriented to person, place, and time. She appears well-developed and well-nourished. No distress.   HENT:   Head: Normocephalic and atraumatic.   Right Ear: External ear normal.   Left Ear: External ear normal.   Eyes: Conjunctivae and EOM are normal. Pupils are equal, round, and  reactive to light. Right eye exhibits no discharge. Left eye exhibits no discharge. No scleral icterus.   Neck: Normal range of motion. Neck supple. No tracheal deviation present.   Cardiovascular: Normal rate, regular rhythm, normal heart sounds and intact distal pulses.  Exam reveals no gallop and no friction rub.    No murmur heard.  Pulmonary/Chest: Effort normal and breath sounds normal. No stridor. No respiratory distress. She has no wheezes. She has no rales.   Abdominal: Soft. Bowel sounds are normal. She exhibits no distension and no mass. There is no tenderness. There is no guarding.   Neurological: She is alert and oriented to person, place, and time.   Skin: Skin is warm and dry. She is not diaphoretic.   Psychiatric: She has a normal mood and affect. Her behavior is normal. Judgment and thought content normal.   Nursing note and vitals reviewed.      Significant Labs:  CBC:   Recent Labs  Lab 07/06/18  1222   WBC 2.28*   RBC 1.79*   HGB 5.0*   HCT 15.5*   PLT 62*   MCV 87   MCH 27.9   MCHC 32.3     CMP:   Recent Labs  Lab 07/06/18  1222      CALCIUM 8.1*   ALBUMIN 2.7*   PROT 7.5      K 3.6   CO2 28      BUN 26*   CREATININE 5.5*   ALKPHOS 588*   ALT 32   AST 56*   BILITOT 0.6

## 2018-07-06 NOTE — HPI
Ms. Patel is a 26 yo AAF with ESRD, HTN, liver transplant in 2012 who presents to the hospital on 07/06 for evaluation of acute blood loss anemia found on labs collected at her dialysis unit on Tuesday.  She reports having a LEEP procedure on June 16th and reported heavy bleeding post procedure but has since decreased in volume, now only reporting spotting.  She denies any decreased trends with her dialysis labs.  She denies SOB or CP, but does endorse dizziness with position changes.  ED labs revealed a Hgb of 5.0 and noted to be hypertensive w/o signs of hypovolemic shock.  No major electrolyte abnormalities noted on renal panel.    She dialyzes at [facility] under the care of Dr. Bass.  She dialyzes for 3.5 hours and reports an EDW ~ 50 kg.  She has an AVF to her LFA.  She no longer makes urine.

## 2018-07-07 LAB
ANION GAP SERPL CALC-SCNC: 12 MMOL/L
BASOPHILS # BLD AUTO: 0.01 K/UL
BASOPHILS NFR BLD: 0.2 %
BLD PROD TYP BPU: NORMAL
BLD PROD TYP BPU: NORMAL
BLOOD UNIT EXPIRATION DATE: NORMAL
BLOOD UNIT EXPIRATION DATE: NORMAL
BLOOD UNIT TYPE CODE: 7300
BLOOD UNIT TYPE CODE: 7300
BLOOD UNIT TYPE: NORMAL
BLOOD UNIT TYPE: NORMAL
BUN SERPL-MCNC: 36 MG/DL
CALCIUM SERPL-MCNC: 8.6 MG/DL
CHLORIDE SERPL-SCNC: 101 MMOL/L
CO2 SERPL-SCNC: 26 MMOL/L
CODING SYSTEM: NORMAL
CODING SYSTEM: NORMAL
CREAT SERPL-MCNC: 6.9 MG/DL
DIFFERENTIAL METHOD: ABNORMAL
DISPENSE STATUS: NORMAL
DISPENSE STATUS: NORMAL
EOSINOPHIL # BLD AUTO: 0.4 K/UL
EOSINOPHIL NFR BLD: 10.1 %
ERYTHROCYTE [DISTWIDTH] IN BLOOD BY AUTOMATED COUNT: 16.2 %
EST. GFR  (AFRICAN AMERICAN): 8.6 ML/MIN/1.73 M^2
EST. GFR  (NON AFRICAN AMERICAN): 7.5 ML/MIN/1.73 M^2
GLUCOSE SERPL-MCNC: 108 MG/DL
HCT VFR BLD AUTO: 20.5 %
HGB BLD-MCNC: 6.7 G/DL
IMM GRANULOCYTES # BLD AUTO: 0.03 K/UL
IMM GRANULOCYTES NFR BLD AUTO: 0.7 %
LYMPHOCYTES # BLD AUTO: 0.4 K/UL
LYMPHOCYTES NFR BLD: 9.9 %
MAGNESIUM SERPL-MCNC: 2.3 MG/DL
MCH RBC QN AUTO: 28.2 PG
MCHC RBC AUTO-ENTMCNC: 32.7 G/DL
MCV RBC AUTO: 86 FL
MONOCYTES # BLD AUTO: 0.2 K/UL
MONOCYTES NFR BLD: 5.9 %
NEUTROPHILS # BLD AUTO: 3 K/UL
NEUTROPHILS NFR BLD: 73.2 %
NRBC BLD-RTO: 0 /100 WBC
PHOSPHATE SERPL-MCNC: 4.1 MG/DL
PLATELET # BLD AUTO: 66 K/UL
PMV BLD AUTO: 11.5 FL
POTASSIUM SERPL-SCNC: 4 MMOL/L
RBC # BLD AUTO: 2.38 M/UL
SODIUM SERPL-SCNC: 139 MMOL/L
TRANS ERYTHROCYTES VOL PATIENT: NORMAL ML
TRANS ERYTHROCYTES VOL PATIENT: NORMAL ML
WBC # BLD AUTO: 4.06 K/UL

## 2018-07-07 PROCEDURE — 85025 COMPLETE CBC W/AUTO DIFF WBC: CPT

## 2018-07-07 PROCEDURE — 25000003 PHARM REV CODE 250: Performed by: HOSPITALIST

## 2018-07-07 PROCEDURE — 99232 SBSQ HOSP IP/OBS MODERATE 35: CPT | Mod: ,,, | Performed by: HOSPITALIST

## 2018-07-07 PROCEDURE — 83735 ASSAY OF MAGNESIUM: CPT

## 2018-07-07 PROCEDURE — 25000003 PHARM REV CODE 250: Performed by: PHYSICIAN ASSISTANT

## 2018-07-07 PROCEDURE — 63600175 PHARM REV CODE 636 W HCPCS: Performed by: HOSPITALIST

## 2018-07-07 PROCEDURE — 63600175 PHARM REV CODE 636 W HCPCS: Performed by: PHYSICIAN ASSISTANT

## 2018-07-07 PROCEDURE — 63600175 PHARM REV CODE 636 W HCPCS: Mod: JG | Performed by: NURSE PRACTITIONER

## 2018-07-07 PROCEDURE — 80048 BASIC METABOLIC PNL TOTAL CA: CPT

## 2018-07-07 PROCEDURE — 90935 HEMODIALYSIS ONE EVALUATION: CPT

## 2018-07-07 PROCEDURE — 90935 HEMODIALYSIS ONE EVALUATION: CPT | Mod: ,,, | Performed by: INTERNAL MEDICINE

## 2018-07-07 PROCEDURE — P9021 RED BLOOD CELLS UNIT: HCPCS

## 2018-07-07 PROCEDURE — 11000001 HC ACUTE MED/SURG PRIVATE ROOM

## 2018-07-07 PROCEDURE — 84100 ASSAY OF PHOSPHORUS: CPT

## 2018-07-07 PROCEDURE — 25000003 PHARM REV CODE 250: Performed by: NURSE PRACTITIONER

## 2018-07-07 PROCEDURE — 36415 COLL VENOUS BLD VENIPUNCTURE: CPT

## 2018-07-07 RX ORDER — SODIUM CHLORIDE 9 MG/ML
INJECTION, SOLUTION INTRAVENOUS ONCE
Status: COMPLETED | OUTPATIENT
Start: 2018-07-07 | End: 2018-07-07

## 2018-07-07 RX ORDER — PROCHLORPERAZINE EDISYLATE 5 MG/ML
5 INJECTION INTRAMUSCULAR; INTRAVENOUS EVERY 6 HOURS PRN
Status: DISCONTINUED | OUTPATIENT
Start: 2018-07-07 | End: 2018-07-08 | Stop reason: HOSPADM

## 2018-07-07 RX ORDER — NIFEDIPINE 30 MG/1
120 TABLET, EXTENDED RELEASE ORAL DAILY
Status: DISCONTINUED | OUTPATIENT
Start: 2018-07-08 | End: 2018-07-08 | Stop reason: HOSPADM

## 2018-07-07 RX ORDER — HYDROCODONE BITARTRATE AND ACETAMINOPHEN 500; 5 MG/1; MG/1
TABLET ORAL
Status: DISCONTINUED | OUTPATIENT
Start: 2018-07-07 | End: 2018-07-08 | Stop reason: HOSPADM

## 2018-07-07 RX ORDER — HYDRALAZINE HYDROCHLORIDE 20 MG/ML
10 INJECTION INTRAMUSCULAR; INTRAVENOUS
Status: COMPLETED | OUTPATIENT
Start: 2018-07-07 | End: 2018-07-07

## 2018-07-07 RX ORDER — ONDANSETRON 4 MG/1
4 TABLET, ORALLY DISINTEGRATING ORAL EVERY 8 HOURS PRN
Status: DISCONTINUED | OUTPATIENT
Start: 2018-07-07 | End: 2018-07-08 | Stop reason: HOSPADM

## 2018-07-07 RX ADMIN — LABETALOL HCL 400 MG: 100 TABLET, FILM COATED ORAL at 05:07

## 2018-07-07 RX ADMIN — ONDANSETRON 4 MG: 4 TABLET, FILM COATED ORAL at 05:07

## 2018-07-07 RX ADMIN — LISINOPRIL 40 MG: 20 TABLET ORAL at 09:07

## 2018-07-07 RX ADMIN — MYCOPHENOLATE MOFETIL 1000 MG: 250 CAPSULE ORAL at 09:07

## 2018-07-07 RX ADMIN — SODIUM CHLORIDE: 0.9 INJECTION, SOLUTION INTRAVENOUS at 08:07

## 2018-07-07 RX ADMIN — HYDRALAZINE HYDROCHLORIDE 100 MG: 50 TABLET ORAL at 09:07

## 2018-07-07 RX ADMIN — LABETALOL HYDROCHLORIDE 10 MG: 5 INJECTION, SOLUTION INTRAVENOUS at 05:07

## 2018-07-07 RX ADMIN — LABETALOL HYDROCHLORIDE 10 MG: 5 INJECTION, SOLUTION INTRAVENOUS at 01:07

## 2018-07-07 RX ADMIN — HYDRALAZINE HYDROCHLORIDE 10 MG: 20 INJECTION INTRAMUSCULAR; INTRAVENOUS at 10:07

## 2018-07-07 RX ADMIN — LABETALOL HCL 400 MG: 100 TABLET, FILM COATED ORAL at 01:07

## 2018-07-07 RX ADMIN — HYDRALAZINE HYDROCHLORIDE 100 MG: 50 TABLET ORAL at 11:07

## 2018-07-07 RX ADMIN — MONTELUKAST SODIUM 10 MG: 10 TABLET, FILM COATED ORAL at 09:07

## 2018-07-07 RX ADMIN — NIFEDIPINE 90 MG: 30 TABLET, FILM COATED, EXTENDED RELEASE ORAL at 09:07

## 2018-07-07 RX ADMIN — LABETALOL HCL 400 MG: 100 TABLET, FILM COATED ORAL at 09:07

## 2018-07-07 RX ADMIN — HYDRALAZINE HYDROCHLORIDE 100 MG: 50 TABLET ORAL at 05:07

## 2018-07-07 RX ADMIN — CLONIDINE 1 PATCH: 0.3 PATCH TRANSDERMAL at 02:07

## 2018-07-07 RX ADMIN — HYDRALAZINE HYDROCHLORIDE 10 MG: 20 INJECTION INTRAMUSCULAR; INTRAVENOUS at 12:07

## 2018-07-07 RX ADMIN — ONDANSETRON 4 MG: 4 TABLET, FILM COATED ORAL at 03:07

## 2018-07-07 RX ADMIN — PROCHLORPERAZINE EDISYLATE 5 MG: 5 INJECTION INTRAMUSCULAR; INTRAVENOUS at 06:07

## 2018-07-07 NOTE — PROGRESS NOTES
"Pt SBP sustained 200"s MD on unit administer PO bp meds and hold epo. Awaiting nurse to tube po bp meds to administer  "

## 2018-07-07 NOTE — ASSESSMENT & PLAN NOTE
-HTN most likely 2/2 renaovascular disese.  -Headache most likely 2/2 HTN; however, no significant changes in vision.  No confusion.  Patient also states that this headache is exactly the same as the headaches she always gets, which may be a migraine vs cluster headache.  -would recommend treating the HA with pain control adequately  -Defer to nephrology to manage her ESRD.  -Recommend continuing home meds:  -Labetolol 400mg Q8  -Hydralzine 100 mg Q8h.  -Lisinopril 40 mg once daily.  -Clonidine 0.3 Q7days.  -Nifidipine 90 mg once daily, which may be increased to 120  -IV labetolol and / or hydralazine to get BP down by 15%-20%  -if there is an acute change in symptoms please do not hesitate to reconsult the ICU

## 2018-07-07 NOTE — PLAN OF CARE
Problem: Patient Care Overview  Goal: Plan of Care Review  Outcome: Ongoing (interventions implemented as appropriate)  Upon arrival pt assses informed pt duration of hd and amt of fluid to be removed and thr importance of compliance with BP meds, pt verbalized understanding

## 2018-07-07 NOTE — PROGRESS NOTES
CN received report from nurse. Pt arrived to HD unit [id number] ambulatory in no apparent distress. PAL fistula button hole no complications noted. Access soaked and accessed with 15G buttonhole needles w/o difficulty. Pt BP high upon arrival [id number], denies ha or discomfort. HD started lines secure with tape and clamps. Pt informed will give meds for BP and that will receive 2U PRBC while on HD verbalized understanding. Will continue to monitot

## 2018-07-07 NOTE — ED NOTES
Spoke with Dr. Almanza regarding patient B/P with diastolic reading greater than 100.  New orders entered for Labetalol oral to be given.

## 2018-07-07 NOTE — PROGRESS NOTES
Report called to [facility] PRUITT, /[id number], informed nurse that pt was kept in unit to be evaluated by ICU team for possible ICU room due to BP in 200/130. Informed nurse that 90mg  Nifedipine po and 40mg of Lisinopril was administered early on in Hd to no avail. Also informed nurse that Md instructed not to administer EPO so its in chart and being sent back with pt. Hydrazaline  adminitered at noon after HD however Bp remained elevated.  Labetalol 400mg was administered @1337 and now pt bp is 185/118 HR 99.. Pt returning to floor sandra sanders

## 2018-07-07 NOTE — H&P
[facility] Hospital Medicine  History & Physical    Patient Name: Holly Patel  MRN: [id number]  Admission Date: 7/6/2018  Attending Physician: Zeenat Almanza MD  Primary Care Provider: Stan Sosa MD    [facility] Medicine Team: [facility] Zeenat Almanza MD     Patient information was obtained from patient and ER records.     Subjective:     Principal Problem:<principal problem not specified>    Chief Complaint:   Chief Complaint   Patient presents with    Abnormal Lab        HPI: The patient is a 26 y/o female with PMH of liver transplant since age 1, on immunosuppression, ESRD on HD TTS, and poorly controlled BP. She was sent by HD after she was noted to have low Hb. She recently underwent a LEEP procedure in mid June with subsequent bleeding. During her most recent admit at the end of June for HTN emergency, during which she required ICU admission, and was also seen by gyn at that time for her continued vaginal bleed.Vaginal packing and cervical sutures were placed and her H&H stabilized. She reports today that her bleeding has improved but she does continue to bleed. She denies any blood in her stool or other sources of bleeding, SOB, or CP, HA, or blurry visions. BPs also found to be elevated in the ER. She states that her BPs always run high.         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)     Moderate protein-calorie malnutrition 2017    MRSA bacteremia 2017    Pneumonia     Prophylactic immunotherapy 2014    Renovascular hypertension 10/2/2015    Secondary hyperparathyroidism 2017    Seizures     Sialadenitis 3/21/2018    Thrombocytopenia 2016       Past Surgical History:   Procedure Laterality Date     SECTION      x 2    CONIZATION OF CERVIX USING LOOP ELECTROSURGICAL EXCISION PROCEDURE (LEEP) N/A 6/15/2018    Procedure: CONIZATION-CERVICAL-LEEP;  Surgeon: Neelam Marroquin MD;  Location: [facility] OR;  Service: OB/GYN;  Laterality: N/A;    EXAMINATION UNDER ANESTHESIA N/A 2018    Procedure: Exam under anesthesia;  Surgeon: Neelam Marroquin MD;  Location: [facility] OR;  Service: OB/GYN;  Laterality: N/A;    LIVER BIOPSY      LIVER TRANSPLANT  1992    PERINEORRHAPHY  2018    Procedure: SUTURE REPAIR,CERVIX;  Surgeon: Neelam Marroquin MD;  Location: [facility] OR;  Service: OB/GYN;;    TUBAL LIGATION         Review of patient's allergies indicates:   Allergen Reactions    Chloral hydrate      Other reaction(s): Hallucinations  Other reaction(s): Hives    Hydrocodone Other (See Comments)     Mental status changes       No current facility-administered medications on file prior to encounter.      Current Outpatient Prescriptions on File Prior to Encounter   Medication Sig    acetaminophen-codeine 300-30mg (TYLENOL #3) 300-30 mg Tab Take 1 tablet by mouth every 6 (six) hours as needed.    aspirin 81 MG Chew Take 1 tablet (81 mg total) by mouth once daily.    calcitRIOL (ROCALTROL) 0.25 MCG Cap Take 1 capsule (0.25 mcg total) by mouth every Mon, Wed, Fri.    cinacalcet (SENSIPAR) 30 MG Tab Take 1 tablet (30 mg total) by mouth daily with breakfast. (Patient taking differently: Take 30 mg by mouth. On Tuesday, Thursday, and Saturday with dialysis)    citalopram (CELEXA) 20 MG tablet TAKE 1 TABLET BY MOUTH EVERY DAY    cloNIDine 0.3 mg/24 hr  td ptwk (CATAPRES) 0.3 mg/24 hr Place 1 patch onto the skin every 7 days.    famotidine (PEPCID) 40 MG tablet Take 0.5 tablets (20 mg total) by mouth once daily.    hydrALAZINE (APRESOLINE) 100 MG tablet Take 1 tablet (100 mg total) by mouth every 8 (eight) hours.    labetalol (NORMODYNE) 200 MG tablet Take 2 tablets (400 mg total) by mouth every 8 (eight) hours. (Patient taking differently: Take 400 mg by mouth every 12 (twelve) hours. )    lisinopril (PRINIVIL,ZESTRIL) 40 MG tablet Take 1 tablet (40 mg total) by mouth once daily.    loratadine (CLARITIN) 10 mg tablet Take 1 tablet (10 mg total) by mouth once daily.    montelukast (SINGULAIR) 10 mg tablet Take 1 tablet (10 mg total) by mouth every evening.    NIFEdipine (PROCARDIA-XL) 90 MG (OSM) 24 hr tablet Take 1 tablet (90 mg total) by mouth once daily.    predniSONE (DELTASONE) 1 MG tablet Take 1 mg by mouth every other day.    tacrolimus (PROGRAF) 1 MG Cap Take 7 capsules (7 mg total) by mouth every 12 (twelve) hours.    vitamin renal formula, B-complex-vitamin c-folic acid, (NEPHROCAP) 1 mg Cap Take 1 capsule by mouth once daily.    food supplemt, lactose-reduced (ENSURE ACTIVE HIGH PROTEIN) Liqd Take 236 mLs by mouth 2 (two) times daily.    mycophenolate (CELLCEPT) 250 mg Cap Take 1,000 mg by mouth 2 (two) times daily.    ondansetron (ZOFRAN) 4 MG tablet Take 1 tablet (4 mg total) by mouth every 6 (six) hours as needed for Nausea. (Patient taking differently: Take 4 mg by mouth once daily. )    triamcinolone acetonide 0.1% (KENALOG) 0.1 % ointment AAA on arms, legs, and neck bid x 1-2 wks then prn flares only (Patient taking differently: Apply to affected area(s) on arms, legs, and neck twice daily x 1-2 wks then as needed for flares only)     Family History     Problem Relation (Age of Onset)    Hypertension Mother, Father        Social History Main Topics    Smoking status: Never Smoker    Smokeless tobacco: Never Used    Alcohol use No     Drug use: No    Sexual activity: No     Review of Systems   Constitutional: Negative for chills, fatigue and fever.   HENT: Negative for congestion, nosebleeds, sinus pain, sneezing and sore throat.    Eyes: Negative for visual disturbance.   Respiratory: Negative for cough and shortness of breath.    Gastrointestinal: Negative for abdominal pain and blood in stool.   Endocrine: Negative for cold intolerance and heat intolerance.   Genitourinary: Positive for vaginal bleeding.   Musculoskeletal: Negative for arthralgias.   Neurological: Negative for light-headedness and headaches.   Psychiatric/Behavioral: Negative for hallucinations.     Objective:     Vital Signs (Most Recent):  Temp: 99.1 °F (37.3 °C) (07/06/18 2030)  Pulse: 94 (07/06/18 2030)  Resp: 16 (07/06/18 1710)  BP: (!) 212/139 (07/06/18 2050)  SpO2: (!) 94 % (07/06/18 2050) Vital Signs (24h Range):  Temp:  [98.3 °F (36.8 °C)-99.1 °F (37.3 °C)] 99.1 °F (37.3 °C)  Pulse:  [] 94  Resp:  [16-18] 16  SpO2:  [94 %-100 %] 94 %  BP: (168-212)/(101-139) 212/139     Weight: 52.6 kg (116 lb)  Body mass index is 19.3 kg/m².    Physical Exam   Constitutional: She is oriented to person, place, and time. She appears well-developed and well-nourished. No distress.   Eyes: EOM are normal.   Neck: Normal range of motion.   Cardiovascular: Normal rate and regular rhythm.    Pulmonary/Chest: Effort normal and breath sounds normal.   Abdominal: Soft. Bowel sounds are normal.   Neurological: She is alert and oriented to person, place, and time.   Skin: No rash noted.   Psychiatric: She has a normal mood and affect.   Vitals reviewed.        CRANIAL NERVES     CN III, IV, VI   Extraocular motions are normal.        Significant Labs: All pertinent labs within the past 24 hours have been reviewed.    Significant Imaging: I have reviewed all pertinent imaging results/findings within the past 24 hours.    Assessment/Plan:     Anemia    - most likely due to continue  vaginal bleeding although it is starting to decrease now  - will plan to transfuse 2 units for now  - continue to follow with gyn as scheduled           ESRD (end stage renal disease) on dialysis    - plan for HD tomorrow  - continue cinacalcet and calcitriol          Renovascular hypertension    - BP control difficult to achieve currently  - will attempt to place patient on a stepdown unit  - d/w patient's outpatient nephrologist and she does have issues with poorly controlled BP; on exam the patient is very comfortable and asymptomatic   - resume all home meds and continue with prn labetalol  - plan for HD tomorrow         Prophylactic immunotherapy    - per liver transplant           Liver replaced by transplant    - continue immunosuppressive therapy with cellcept and prograf            VTE Risk Mitigation         Ordered     Place LINDA hose  Until discontinued      07/06/18 1530     Place sequential compression device  Until discontinued      07/06/18 1530     IP VTE HIGH RISK PATIENT  Once      07/06/18 1530             Zeenat Almanza MD  Department of Hospital Medicine   [facility]

## 2018-07-07 NOTE — PROGRESS NOTES
Pts BP remains 209/132 despite administration of BP meds. Patient continues to c/o headache. Dr. Bass made aware. Dr. Almanza also made aware; will consult critical care. Monitoring.

## 2018-07-07 NOTE — PROGRESS NOTES
[facility]     Patient currently on hemodialysis for removal of uremic toxins and volume.    Patient seen and evaluated on hemodialysis, tolerating treatment, though with elevated BP (receiving PRBC); see HD flowsheet for vitals and assessments.      Ultrafiltration goal is 0-1L     Labs have been reviewed and the dialysate bath has been adjusted.     Assessment/Plan: ESRD, HD today for removal of uremic toxins and volume, continue TTS while in-house.  Will monitor BP with meds administered this am.

## 2018-07-07 NOTE — ASSESSMENT & PLAN NOTE
- most likely due to continue vaginal bleeding although it is starting to decrease now  - will plan to transfuse 2 units for now  - continue to follow with gyn as scheduled

## 2018-07-07 NOTE — SUBJECTIVE & OBJECTIVE
Past Medical History:   Diagnosis Date    Anemia in ESRD (end-stage renal disease) 10/12/2015    dialysis es, thursday, sat; access left arm    Chronic rejection of liver transplant 3/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 ()    Moderate protein-calorie malnutrition 2017    MRSA bacteremia 2017    Pneumonia     Prophylactic immunotherapy 2014    Renovascular hypertension 10/2/2015    Secondary hyperparathyroidism 2017    Seizures     Sialadenitis 3/21/2018    Thrombocytopenia 2016       Past Surgical History:   Procedure Laterality Date     SECTION      x 2    CONIZATION OF CERVIX USING LOOP ELECTROSURGICAL EXCISION PROCEDURE (LEEP) N/A 6/15/2018    Procedure: CONIZATION-CERVICAL-LEEP;  Surgeon: Neelam Marroquin MD;  [facility];  Service: OB/GYN;  Laterality: N/A;    EXAMINATION UNDER ANESTHESIA N/A 2018    Procedure: Exam under anesthesia;  Surgeon: Neelam Marroquin MD;  Location: [facility];  Service: OB/GYN;  Laterality: N/A;    LIVER BIOPSY      LIVER TRANSPLANT  1992    PERINEORRHAPHY  2018    Procedure: SUTURE REPAIR,CERVIX;  Surgeon: Neelam Marroquin MD;  [facility]: [facility];  Service: OB/GYN;;    TUBAL LIGATION         Review of patient's allergies indicates:   Allergen Reactions    Chloral hydrate      Other reaction(s): Hallucinations  Other reaction(s): Hives    Hydrocodone Other (See Comments)     Mental status changes       No current facility-administered medications on file prior to encounter.      Current Outpatient  Prescriptions on File Prior to Encounter   Medication Sig    acetaminophen-codeine 300-30mg (TYLENOL #3) 300-30 mg Tab Take 1 tablet by mouth every 6 (six) hours as needed.    aspirin 81 MG Chew Take 1 tablet (81 mg total) by mouth once daily.    calcitRIOL (ROCALTROL) 0.25 MCG Cap Take 1 capsule (0.25 mcg total) by mouth every Mon, Wed, Fri.    cinacalcet (SENSIPAR) 30 MG Tab Take 1 tablet (30 mg total) by mouth daily with breakfast. (Patient taking differently: Take 30 mg by mouth. On Tuesday, Thursday, and Saturday with dialysis)    citalopram (CELEXA) 20 MG tablet TAKE 1 TABLET BY MOUTH EVERY DAY    cloNIDine 0.3 mg/24 hr td ptwk (CATAPRES) 0.3 mg/24 hr Place 1 patch onto the skin every 7 days.    famotidine (PEPCID) 40 MG tablet Take 0.5 tablets (20 mg total) by mouth once daily.    hydrALAZINE (APRESOLINE) 100 MG tablet Take 1 tablet (100 mg total) by mouth every 8 (eight) hours.    labetalol (NORMODYNE) 200 MG tablet Take 2 tablets (400 mg total) by mouth every 8 (eight) hours. (Patient taking differently: Take 400 mg by mouth every 12 (twelve) hours. )    lisinopril (PRINIVIL,ZESTRIL) 40 MG tablet Take 1 tablet (40 mg total) by mouth once daily.    loratadine (CLARITIN) 10 mg tablet Take 1 tablet (10 mg total) by mouth once daily.    montelukast (SINGULAIR) 10 mg tablet Take 1 tablet (10 mg total) by mouth every evening.    NIFEdipine (PROCARDIA-XL) 90 MG (OSM) 24 hr tablet Take 1 tablet (90 mg total) by mouth once daily.    predniSONE (DELTASONE) 1 MG tablet Take 1 mg by mouth every other day.    tacrolimus (PROGRAF) 1 MG Cap Take 7 capsules (7 mg total) by mouth every 12 (twelve) hours.    vitamin renal formula, B-complex-vitamin c-folic acid, (NEPHROCAP) 1 mg Cap Take 1 capsule by mouth once daily.    food supplemt, lactose-reduced (ENSURE ACTIVE HIGH PROTEIN) Liqd Take 236 mLs by mouth 2 (two) times daily.    mycophenolate (CELLCEPT) 250 mg Cap Take 1,000 mg by mouth 2 (two) times  daily.    ondansetron (ZOFRAN) 4 MG tablet Take 1 tablet (4 mg total) by mouth every 6 (six) hours as needed for Nausea. (Patient taking differently: Take 4 mg by mouth once daily. )    triamcinolone acetonide 0.1% (KENALOG) 0.1 % ointment AAA on arms, legs, and neck bid x 1-2 wks then prn flares only (Patient taking differently: Apply to affected area(s) on arms, legs, and neck twice daily x 1-2 wks then as needed for flares only)     Family History     Problem Relation (Age of Onset)    Hypertension Mother, Father        Social History Main Topics    Smoking status: Never Smoker    Smokeless tobacco: Never Used    Alcohol use No    Drug use: No    Sexual activity: No     Review of Systems   Constitutional: Negative for chills, fatigue and fever.   HENT: Negative for congestion, nosebleeds, sinus pain, sneezing and sore throat.    Eyes: Negative for visual disturbance.   Respiratory: Negative for cough and shortness of breath.    Gastrointestinal: Negative for abdominal pain and blood in stool.   Endocrine: Negative for cold intolerance and heat intolerance.   Genitourinary: Positive for vaginal bleeding.   Musculoskeletal: Negative for arthralgias.   Neurological: Negative for light-headedness and headaches.   Psychiatric/Behavioral: Negative for hallucinations.     Objective:     Vital Signs (Most Recent):  Temp: 99.1 °F (37.3 °C) (07/06/18 2030)  Pulse: 94 (07/06/18 2030)  Resp: 16 (07/06/18 1710)  BP: (!) 212/139 (07/06/18 2050)  SpO2: (!) 94 % (07/06/18 2050) Vital Signs (24h Range):  Temp:  [98.3 °F (36.8 °C)-99.1 °F (37.3 °C)] 99.1 °F (37.3 °C)  Pulse:  [] 94  Resp:  [16-18] 16  SpO2:  [94 %-100 %] 94 %  BP: (168-212)/(101-139) 212/139     Weight: 52.6 kg (116 lb)  Body mass index is 19.3 kg/m².    Physical Exam   Constitutional: She is oriented to person, place, and time. She appears well-developed and well-nourished. No distress.   Eyes: EOM are normal.   Neck: Normal range of motion.    Cardiovascular: Normal rate and regular rhythm.    Pulmonary/Chest: Effort normal and breath sounds normal.   Abdominal: Soft. Bowel sounds are normal.   Neurological: She is alert and oriented to person, place, and time.   Skin: No rash noted.   Psychiatric: She has a normal mood and affect.   Vitals reviewed.        CRANIAL NERVES     CN III, IV, VI   Extraocular motions are normal.        Significant Labs: All pertinent labs within the past 24 hours have been reviewed.    Significant Imaging: I have reviewed all pertinent imaging results/findings within the past 24 hours.

## 2018-07-07 NOTE — CONSULTS
[facility]  Critical Care Medicine  Consult Note    Patient Name: Holly Patel  MRN: [id number]  Admission Date: 7/6/2018  Hospital Length of Stay: 1 days  Code Status: Full Code  Attending Physician: Cami  Primary Care Provider: Stan Sosa MD   Principal Problem: Acute blood loss anemia    Consults  Subjective:     HPI:  The patient is a 28 y/o female with PMH of liver transplant at age 1, on immunosuppression, ESRD on HD TTS, and poorly controlled BP who was admitted to hospital medicine for hypertension and anemia.     She recently underwent a LEEP procedure in mid June with subsequent bleeding which requires 9 units blood transfusion. She had 4 She presented to ED yesterday for anemia and hypertension. ICU was consulted for HTN urgency.    Today, She has throobing headache on the L side since yesterday. She has L side blurry vision. No confusion, siezures, nausea, vomiting or loss of consiousness. She soaks 2 pads per day with no change in bleeding quantity.     Hospital/ICU Course:  No notes on file    Past Medical History:   Diagnosis Date    Anemia in ESRD (end-stage renal disease) 10/12/2015    dialysis tues, thursday, sat; access left arm    Chronic rejection of liver transplant 3/22/2016    Depression     Encounter for blood transfusion     ESRD on hemodialysis 9/30/2015    History of recent hospitalization 05/2018    pneumonia    History of splenomegaly 4/12/2016    Immunosuppressed 8/5/2017    Iron deficiency anemia secondary to inadequate dietary iron intake 8/16/2017    She [facility].   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)    Moderate protein-calorie malnutrition 8/16/2017    MRSA bacteremia 8/6/2017    Pneumonia     Prophylactic immunotherapy 8/4/2014    Renovascular hypertension 10/2/2015    Secondary hyperparathyroidism 8/5/2017    Seizures     Sialadenitis 3/21/2018     Thrombocytopenia 2016       Past Surgical History:   Procedure Laterality Date     SECTION      x 2    CONIZATION OF CERVIX USING LOOP ELECTROSURGICAL EXCISION PROCEDURE (LEEP) N/A 6/15/2018    Procedure: CONIZATION-CERVICAL-LEEP;  Surgeon: Neelam Marroquin MD;  Location: [facility];  Service: OB/GYN;  Laterality: N/A;    EXAMINATION UNDER ANESTHESIA N/A 2018    Procedure: Exam under anesthesia;  Surgeon: Neelam Marroquin MD;  Location: [facility];  Service: OB/GYN;  Laterality: N/A;    LIVER BIOPSY      LIVER TRANSPLANT  1992    PERINEORRHAPHY  2018    Procedure: SUTURE REPAIR,CERVIX;  Surgeon: Neelam Marroquin MD;  Location: [facility];  Service: OB/GYN;;    TUBAL LIGATION         Review of patient's allergies indicates:   Allergen Reactions    Chloral hydrate      Other reaction(s): Hallucinations  Other reaction(s): Hives    Hydrocodone Other (See Comments)     Mental status changes       Family History     Problem Relation (Age of Onset)    Hypertension Mother, Father        Social History Main Topics    Smoking status: Never Smoker    Smokeless tobacco: Never Used    Alcohol use No    Drug use: No    Sexual activity: No      Review of Systems   Constitutional: Positive for appetite change. Negative for chills, diaphoresis and fever.   HENT: Negative for congestion and nosebleeds.    Eyes: Positive for photophobia. Negative for visual disturbance.        L side retro-orbital pain   Respiratory: Positive for cough. Negative for chest tightness and shortness of breath.    Cardiovascular: Negative for chest pain, palpitations and leg swelling.   Gastrointestinal: Negative for abdominal pain and nausea.   Endocrine: Negative for polydipsia and polyphagia.   Genitourinary: Negative for dysuria.   Musculoskeletal: Negative for arthralgias.   Skin: Negative for rash.        Abdominal scar 2/2 Liver transplant surgery   Allergic/Immunologic: Positive for  immunocompromised state.   Neurological: Positive for headaches. Negative for seizures, syncope, facial asymmetry and numbness.   Hematological: Negative for adenopathy.   Psychiatric/Behavioral: Negative for agitation and confusion.     Objective:     Vital Signs (Most Recent):  Temp: 98.1 °F (36.7 °C) (07/07/18 1200)  Pulse: 103 (07/07/18 1340)  Resp: 16 (07/07/18 0730)  BP: (!) 185/124 (07/07/18 1340)  SpO2: 98 % (07/07/18 0415) Vital Signs (24h Range):  Temp:  [97.8 °F (36.6 °C)-99.1 °F (37.3 °C)] 98.1 °F (36.7 °C)  Pulse:  [] 103  Resp:  [16-18] 16  SpO2:  [94 %-100 %] 98 %  BP: (168-235)/() 185/124   Weight: 52.8 kg (116 lb 6.5 oz)  Body mass index is 19.37 kg/m².      Intake/Output Summary (Last 24 hours) at 07/07/18 1409  Last data filed at 07/07/18 1145   Gross per 24 hour   Intake          2524.08 ml   Output             2100 ml   Net           424.08 ml       Physical Exam   Constitutional: She is oriented to person, place, and time. She appears well-developed and well-nourished.   HENT:   Head: Normocephalic and atraumatic.   Right Ear: External ear normal.   Left Ear: External ear normal.   Nose: Nose normal.   Eyes: EOM are normal. Pupils are equal, round, and reactive to light. Right eye exhibits no discharge. Left eye exhibits no discharge.   Pallor   Neck: Normal range of motion. Neck supple.   Cardiovascular: Normal rate and regular rhythm.    Pulmonary/Chest: Effort normal and breath sounds normal.   Abdominal: Soft. Bowel sounds are normal.   Musculoskeletal: Normal range of motion. She exhibits no edema or deformity.   Neurological: She is alert and oriented to person, place, and time.   Skin: Skin is warm and dry. No rash noted.   Psychiatric: She has a normal mood and affect.       Vents:     Lines/Drains/Airways     Drain                 Hemodialysis AV Fistula Left forearm -- days          Peripheral Intravenous Line                 Peripheral IV - Single Lumen 07/06/18 1222  Right Wrist 1 day              Significant Labs:    CBC/Anemia Profile:    Recent Labs  Lab 07/06/18  1222 07/07/18  0627   WBC 2.28* 4.06   HGB 5.0* 6.7*   HCT 15.5* 20.5*   PLT 62* 66*   MCV 87 86   RDW 16.6* 16.2*        Chemistries:    Recent Labs  Lab 07/06/18  1222 07/07/18  0627    139   K 3.6 4.0    101   CO2 28 26   BUN 26* 36*   CREATININE 5.5* 6.9*   CALCIUM 8.1* 8.6*   ALBUMIN 2.7*  --    PROT 7.5  --    BILITOT 0.6  --    ALKPHOS 588*  --    ALT 32  --    AST 56*  --    MG 2.5 2.3   PHOS 2.9 4.1       None    Significant Imaging: I have reviewed all pertinent imaging results/findings within the past 24 hours.    Assessment/Plan:     Cardiac/Vascular   Renovascular hypertension    -HTN most likely 2/2 renaovascular disese.  -Headache most likely 2/2 HTN; however, no significant changes in vision.  No confusion.  Patient also states that this headache is exactly the same as the headaches she always gets, which may be a migraine vs cluster headache.  -would recommend treating the HA with pain control adequately  -Defer to nephrology to manage her ESRD.  -Recommend continuing home meds:  -Labetolol 400mg Q8  -Hydralzine 100 mg Q8h.  -Lisinopril 40 mg once daily.  -Clonidine 0.3 Q7days.  -Nifidipine 90 mg once daily, which may be increased to 120  -IV labetolol and / or hydralazine to get BP down by 15%-20%  -if there is an acute change in symptoms please do not hesitate to reconsult the ICU        Renal/   Vagina bleeding    Recommend to continue trend H&H to keep Hg>7        GI   Liver replaced by transplant    She is on immunosuppressant medications.  continue             Critical Care Daily Checklist:    A: Awake: RASS Goal/Actual Goal:    Actual:     B: Spontaneous Breathing Trial Performed?     C: SAT & SBT Coordinated?  na                      D: Delirium: CAM-ICU     E: Early Mobility Performed? No   F: Feeding Goal:    Status:     Current Diet Order   Procedures    Diet renal      AS:  Analgesia/Sedation no   T: Thromboembolic Prophylaxis no   H: HOB > 300 No   U: Stress Ulcer Prophylaxis (if needed)    G: Glucose Control    B: Bowel Function     I: Indwelling Catheter (Lines & Rapp) Necessity    D: De-escalation of Antimicrobials/Pharmacotherapies     Plan for the day/ETD     Code Status:  Family/Goals of Care: Full Code                Thank you for your consult. I will sign off. Please contact us if you have any additional questions.     Madeline Michel MD  Critical Care Medicine  [facility]

## 2018-07-07 NOTE — NURSING
Pt received to room 387 on telemetry per stretcher, mother at bedside, pt ambulated to bed without difficulty, aaox4, voices no c/o at present, MEMBRENO, RR unlabored on RA, afebrile, elevated BP noted, admit assessment per flowsheet, oriented to room setup and call bell system, +return demo and placed call bell in pt hand, LFA AV fistula, + for thrill and bruit, safety measures in place, call placed to MD concerning BP, orders to follow, monitoring for changes.

## 2018-07-07 NOTE — PROGRESS NOTES
Pt completed 2nd unit of blood. SBP remians elevated despite administering Hydrazaline 100mg po. Md at bedside and aware. Before calling report pt will stay in HD unit to monitor if BP decreases. Hd completed blood reinfused. Pressure held to LFA fistula until hemostasis achieved, gauze and tape applied. 1L of fluid was removed during HD

## 2018-07-07 NOTE — CARE UPDATE
Spoke with Dr. Bass, patient's HD nephrologist and patient's BP are usually uncontrolled. Her BPs will eventually come under some acceptable control with her medications and PRNs and therefore there is no emergent need for HD tonight. Will place order for prn labetalol and discussed with house supervisor and ER charge nurse about moving her to a stepdown unit that can manage her BPs. SBP range of [id number] and DBP range of 100-110 would be acceptable for now for the floor. Please continue with transfusion of second unit of PRBC.

## 2018-07-07 NOTE — NURSING
/126, patient is vomiting at bedside, headache 5/10 now.  WILVER Almanza M.D. Notified.  Ordered Prochlorperazine (Compazine) INJ 5 mg Q6H PRN nausea and vomiting, and ODT 4 mg oral q8h as second choice.  Will continue to monitor.  Call WILVER Almanza M.D. If symptoms persist.

## 2018-07-07 NOTE — PROGRESS NOTES
07/07/18 1725   Vital Signs   Pulse 95   Heart Rate Source SpO2   Resp 20   SpO2 99 %   Pulse Oximetry Type Intermittent   O2 Device (Oxygen Therapy) room air   BP (!) 205/126   MAP (mmHg) 159   BP Location Right arm   BP Method Automatic   Patient Position Lying   WILVER Almanza M.D., and Amber Ashley RN notified.  Morning medications held because patient cannot consume them without vomiting.  Administered Zofran @ 1553 hours.  Patient vomited medication.  Administered labetalol INJ 10 mg at 1732 hours.  Will recheck BP.

## 2018-07-07 NOTE — ASSESSMENT & PLAN NOTE
- BP control difficult to achieve currently  - will attempt to place patient on a stepdown unit  - d/w patient's outpatient nephrologist and she does have issues with poorly controlled BP; on exam the patient is very comfortable and asymptomatic   - resume all home meds and continue with prn labetalol  - plan for HD tomorrow

## 2018-07-07 NOTE — PROGRESS NOTES
07/07/18 1511   Vital Signs   Temp 97.7 °F (36.5 °C)   Temp src Oral   Pulse 100   Heart Rate Source Monitor   Resp 16   SpO2 99 %   Pulse Oximetry Type Intermittent   O2 Device (Oxygen Therapy) room air   BP (!) 193/122   MAP (mmHg) 152   BP Location Right arm   BP Method Automatic   Patient Position Lying   Paged team.  Critical Care Response Team called @ [phone] to assess patient.  Critical Consult completed while patient was in dialysis.  No new orders at this time.

## 2018-07-07 NOTE — NURSING TRANSFER
Nursing Transfer Note      7/7/2018     Transfer To: Dialysis    Transfer via stretcher    Transfer with cardiac monitoring    Transported by [facility] sent: No    Chart send with patient: Yes    Notified: Mother

## 2018-07-07 NOTE — CARE UPDATE
"  RN Proactive Rounding Note  Time of Visit: 1550    Admit Date: 2018  LOS: 1  Code Status: Full Code   Date of Visit: 2018  : 1990  Age: 27 y.o.  Sex: female  Bed: 387/387 A:   MRN: [id number]  Was the patient discharged from an ICU this admission? no  Was the patient discharged from a PACU within last 24 hours? no  Did the patient receive conscious sedation/general anesthesia in last 24 hours? no  Was the patient in the ED within the past 24 hours? no  Was the patient started on NIPPV within the past 24 hours? no  Attending Physician: Zeenat Almanza MD  Primary Service: [facility] MED       ASSESSMENT:     Abnormal Vital Signs: /114  Clinical Issues: Neurological     INTERVENTIONS/ RECOMMENDATIONS:     Patient with headache since yesterday rated 10/10, now vomited x 1.  Patient is awake, alert.  Oriented x 4.  Speech clear and appropriate.  Patient with symmetrical facial muscles.  Purposeful movement.  Denies weakness, numbness or tingling.  Patient states headache is her typically headache, that she lives with "all the time".  Does report getting nauseated in dialysis, which has continued.  Initially stated abdomen painful since dialysis, but when examined no pain with palpation.  Patient states her abdomen in aching no-umbilical and queasy.      Discussed plan of care with RN  Patient requesting relief from nausea, with order to follow.  Primary nurse aware.  Primary nurse also to follow up with physician for diastolic blood pressure of 114.    PHYSICIAN ESCALATION:     Yes/No Primary nurse to notify of diastolic BP.    Orders received and case discussed with Dr Randal lundy primary.    Disposition: N/A    FOLLOW-UP/CONTINGENCY:       Call back the Rapid Response Nurse at [phone]  for additional questions or concerns      "

## 2018-07-07 NOTE — PROGRESS NOTES
Pt administered 90mg Nifedipine po and 40mg Lisinopril po for BP. 1st unit of 2U of PRBC infusing w/o difficulty. Md at bedside, goal decreased to 1L off per MD. Will continue to monitor pt BP and notify Md if doesn't come down

## 2018-07-07 NOTE — ASSESSMENT & PLAN NOTE
- BP still uncontrolled  - HD today   - given HA and vomiting, ICU was consulted but did not feel that patient requires ICU admit at this time; ICU to be reconsulted if she continues to worsen  - continue home medication regimen with increase in nifedipine dose  - per patient's nephrologist, this is very usual for her but bps will eventually come under control

## 2018-07-07 NOTE — ASSESSMENT & PLAN NOTE
- most likely due to continue vaginal bleeding although it is starting to decrease now  - patient had almost close to appropriate response but Hb still < 7 therefore will plan to transfuse 2 additional units on HD  - discussed with gyn: will continue to monitor and do pad count

## 2018-07-07 NOTE — SUBJECTIVE & OBJECTIVE
Past Medical History:   Diagnosis Date    Anemia in ESRD (end-stage renal disease) 10/12/2015    dialysis es, thursday, sat; access left arm    Chronic rejection of liver transplant 3/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 ()    Moderate protein-calorie malnutrition 2017    MRSA bacteremia 2017    Pneumonia     Prophylactic immunotherapy 2014    Renovascular hypertension 10/2/2015    Secondary hyperparathyroidism 2017    Seizures     Sialadenitis 3/21/2018    Thrombocytopenia 2016       Past Surgical History:   Procedure Laterality Date     SECTION      x 2    CONIZATION OF CERVIX USING LOOP ELECTROSURGICAL EXCISION PROCEDURE (LEEP) N/A 6/15/2018    Procedure: CONIZATION-CERVICAL-LEEP;  Surgeon: Neelam Marroquin MD;  Location: [facility];  Service: OB/GYN;  Laterality: N/A;    EXAMINATION UNDER ANESTHESIA N/A 2018    Procedure: Exam under anesthesia;  Surgeon: Neelam Marroquin MD;  Location: [facility] OR;  Service: OB/GYN;  Laterality: N/A;    LIVER BIOPSY      LIVER TRANSPLANT  1992    PERINEORRHAPHY  2018    Procedure: SUTURE REPAIR,CERVIX;  Surgeon: Neelam Marroquin MD;  Location: [facility] OR;  Service: OB/GYN;;    TUBAL LIGATION         Review of patient's allergies indicates:   Allergen Reactions    Chloral hydrate      Other reaction(s): Hallucinations  Other reaction(s): Hives    Hydrocodone Other (See Comments)     Mental status changes       Family History     Problem Relation (Age of Onset)    Hypertension Mother, Father        Social  History Main Topics    Smoking status: Never Smoker    Smokeless tobacco: Never Used    Alcohol use No    Drug use: No    Sexual activity: No      Review of Systems   Constitutional: Positive for appetite change. Negative for chills, diaphoresis and fever.   HENT: Negative for congestion and nosebleeds.    Eyes: Positive for photophobia. Negative for visual disturbance.        L side retro-orbital pain   Respiratory: Positive for cough. Negative for chest tightness and shortness of breath.    Cardiovascular: Negative for chest pain, palpitations and leg swelling.   Gastrointestinal: Negative for abdominal pain and nausea.   Endocrine: Negative for polydipsia and polyphagia.   Genitourinary: Negative for dysuria.   Musculoskeletal: Negative for arthralgias.   Skin: Negative for rash.        Abdominal scar 2/2 Liver transplant surgery   Allergic/Immunologic: Positive for immunocompromised state.   Neurological: Positive for headaches. Negative for seizures, syncope, facial asymmetry and numbness.   Hematological: Negative for adenopathy.   Psychiatric/Behavioral: Negative for agitation and confusion.     Objective:     Vital Signs (Most Recent):  Temp: 98.1 °F (36.7 °C) (07/07/18 1200)  Pulse: 103 (07/07/18 1340)  Resp: 16 (07/07/18 0730)  BP: (!) 185/124 (07/07/18 1340)  SpO2: 98 % (07/07/18 0415) Vital Signs (24h Range):  Temp:  [97.8 °F (36.6 °C)-99.1 °F (37.3 °C)] 98.1 °F (36.7 °C)  Pulse:  [] 103  Resp:  [16-18] 16  SpO2:  [94 %-100 %] 98 %  BP: (168-235)/() 185/124   Weight: 52.8 kg (116 lb 6.5 oz)  Body mass index is 19.37 kg/m².      Intake/Output Summary (Last 24 hours) at 07/07/18 1409  Last data filed at 07/07/18 1145   Gross per 24 hour   Intake          2524.08 ml   Output             2100 ml   Net           424.08 ml       Physical Exam   Constitutional: She is oriented to person, place, and time. She appears well-developed and well-nourished.   HENT:   Head: Normocephalic and  atraumatic.   Right Ear: External ear normal.   Left Ear: External ear normal.   Nose: Nose normal.   Eyes: EOM are normal. Pupils are equal, round, and reactive to light. Right eye exhibits no discharge. Left eye exhibits no discharge.   Pallor   Neck: Normal range of motion. Neck supple.   Cardiovascular: Normal rate and regular rhythm.    Pulmonary/Chest: Effort normal and breath sounds normal.   Abdominal: Soft. Bowel sounds are normal.   Musculoskeletal: Normal range of motion. She exhibits no edema or deformity.   Neurological: She is alert and oriented to person, place, and time.   Skin: Skin is warm and dry. No rash noted.   Psychiatric: She has a normal mood and affect.       Vents:     Lines/Drains/Airways     Drain                 Hemodialysis AV Fistula Left forearm -- days          Peripheral Intravenous Line                 Peripheral IV - Single Lumen 07/06/18 1222 Right Wrist 1 day              Significant Labs:    CBC/Anemia Profile:    Recent Labs  Lab 07/06/18  1222 07/07/18  0627   WBC 2.28* 4.06   HGB 5.0* 6.7*   HCT 15.5* 20.5*   PLT 62* 66*   MCV 87 86   RDW 16.6* 16.2*        Chemistries:    Recent Labs  Lab 07/06/18  1222 07/07/18  0627    139   K 3.6 4.0    101   CO2 28 26   BUN 26* 36*   CREATININE 5.5* 6.9*   CALCIUM 8.1* 8.6*   ALBUMIN 2.7*  --    PROT 7.5  --    BILITOT 0.6  --    ALKPHOS 588*  --    ALT 32  --    AST 56*  --    MG 2.5 2.3   PHOS 2.9 4.1       None    Significant Imaging: I have reviewed all pertinent imaging results/findings within the past 24 hours.

## 2018-07-07 NOTE — PLAN OF CARE
Notified by ER nurse on several occasions this afternoon / evening regarding patient's elevated BPs. Patient unable to be accepted to the floor due to her current BPs. The patient is a known poorly controlled hypertensive patient with PMH of ESRD. She has required ICU admission in the past for her uncontrolled BPs. She is entirely asymptomatic and has taken all of her BP meds this am. Will discuss with nephrology about her possibly getting HD tonight to help with BP management and at that time will plan to transfuse her second unit of PRBC. I am ok with the patient moving out of the ER up to the medicine floor with her current BPs. Will place an order for labetalol PRN for SBPs > 200s.

## 2018-07-07 NOTE — HPI
The patient is a 28 y/o female with PMH of liver transplant since age 1, on immunosuppression, ESRD on HD TTS, and poorly controlled BP. She was sent by HD after she was noted to have low Hb. She recently underwent a LEEP procedure in mid June with subsequent bleeding. During her most recent admit at the end of June for HTN emergency, during which she required ICU admission, and was also seen by gyn at that time for her continued vaginal bleed.Vaginal packing and cervical sutures were placed and her H&H stabilized. She reports today that her bleeding has improved but she does continue to bleed. She denies any blood in her stool or other sources of bleeding, SOB, or CP, HA, or blurry visions. BPs also found to be elevated in the ER. She states that her BPs always run high.

## 2018-07-07 NOTE — PLAN OF CARE
Problem: Patient Care Overview  Goal: Plan of Care Review  Outcome: Ongoing (interventions implemented as appropriate)  Upon arrival to floor informed pt of duration of Hd and amt of fluid to be removed as well as there importance of compliance with BP meds. Pt verbalized understanding

## 2018-07-07 NOTE — HPI
The patient is a 28 y/o female with PMH of liver transplant at age 1, on immunosuppression, ESRD on HD TTS, and poorly controlled BP who was admitted to hospital medicine for hypertension and anemia.     She recently underwent a LEEP procedure in mid June with subsequent bleeding which requires 9 units blood transfusion. She had 4 She presented to ED yesterday for anemia and hypertension. ICU was consulted for HTN urgency.    Today, She has throobing headache on the L side since yesterday. She has L side blurry vision. No confusion, siezures, nausea, vomiting or loss of consiousness. She soaks 2 pads per day with no change in bleeding quantity.

## 2018-07-07 NOTE — PROGRESS NOTES
Pt remains in HD unit awaiting decision of whether to transfer to unit or back to room. ICU team has come to evaluate pt awaiting answer, CN aware. 1342 /124 400mg labetalol administered

## 2018-07-08 VITALS
HEIGHT: 65 IN | TEMPERATURE: 98 F | WEIGHT: 116.38 LBS | DIASTOLIC BLOOD PRESSURE: 97 MMHG | RESPIRATION RATE: 18 BRPM | BODY MASS INDEX: 19.39 KG/M2 | HEART RATE: 78 BPM | OXYGEN SATURATION: 99 % | SYSTOLIC BLOOD PRESSURE: 158 MMHG

## 2018-07-08 LAB
ANION GAP SERPL CALC-SCNC: 7 MMOL/L
BASOPHILS # BLD AUTO: 0.02 K/UL
BASOPHILS NFR BLD: 0.4 %
BUN SERPL-MCNC: 16 MG/DL
CALCIUM SERPL-MCNC: 8.4 MG/DL
CHLORIDE SERPL-SCNC: 106 MMOL/L
CO2 SERPL-SCNC: 24 MMOL/L
CREAT SERPL-MCNC: 4.2 MG/DL
DIFFERENTIAL METHOD: ABNORMAL
EOSINOPHIL # BLD AUTO: 0.6 K/UL
EOSINOPHIL NFR BLD: 11.4 %
ERYTHROCYTE [DISTWIDTH] IN BLOOD BY AUTOMATED COUNT: 16.3 %
EST. GFR  (AFRICAN AMERICAN): 15.7 ML/MIN/1.73 M^2
EST. GFR  (NON AFRICAN AMERICAN): 13.7 ML/MIN/1.73 M^2
GLUCOSE SERPL-MCNC: 97 MG/DL
HCT VFR BLD AUTO: 26.9 %
HGB BLD-MCNC: 8.8 G/DL
IMM GRANULOCYTES # BLD AUTO: 0.02 K/UL
IMM GRANULOCYTES NFR BLD AUTO: 0.4 %
LYMPHOCYTES # BLD AUTO: 0.7 K/UL
LYMPHOCYTES NFR BLD: 13.1 %
MAGNESIUM SERPL-MCNC: 2 MG/DL
MCH RBC QN AUTO: 28.9 PG
MCHC RBC AUTO-ENTMCNC: 32.7 G/DL
MCV RBC AUTO: 88 FL
MONOCYTES # BLD AUTO: 0.4 K/UL
MONOCYTES NFR BLD: 7.6 %
NEUTROPHILS # BLD AUTO: 3.7 K/UL
NEUTROPHILS NFR BLD: 67.1 %
NRBC BLD-RTO: 0 /100 WBC
PHOSPHATE SERPL-MCNC: 3.3 MG/DL
PLATELET # BLD AUTO: 79 K/UL
PMV BLD AUTO: 10.2 FL
POTASSIUM SERPL-SCNC: 4.6 MMOL/L
RBC # BLD AUTO: 3.05 M/UL
SODIUM SERPL-SCNC: 137 MMOL/L
TACROLIMUS BLD-MCNC: <1.5 NG/ML
WBC # BLD AUTO: 5.43 K/UL

## 2018-07-08 PROCEDURE — 99223 1ST HOSP IP/OBS HIGH 75: CPT | Mod: GC,,, | Performed by: INTERNAL MEDICINE

## 2018-07-08 PROCEDURE — 80048 BASIC METABOLIC PNL TOTAL CA: CPT

## 2018-07-08 PROCEDURE — 84100 ASSAY OF PHOSPHORUS: CPT

## 2018-07-08 PROCEDURE — 83735 ASSAY OF MAGNESIUM: CPT

## 2018-07-08 PROCEDURE — 63600175 PHARM REV CODE 636 W HCPCS: Performed by: HOSPITALIST

## 2018-07-08 PROCEDURE — 85025 COMPLETE CBC W/AUTO DIFF WBC: CPT

## 2018-07-08 PROCEDURE — 25000003 PHARM REV CODE 250: Performed by: HOSPITALIST

## 2018-07-08 PROCEDURE — 99238 HOSP IP/OBS DSCHRG MGMT 30/<: CPT | Mod: ,,, | Performed by: HOSPITALIST

## 2018-07-08 PROCEDURE — 80197 ASSAY OF TACROLIMUS: CPT

## 2018-07-08 RX ORDER — NIFEDIPINE 60 MG/1
120 TABLET, EXTENDED RELEASE ORAL DAILY
Qty: 60 TABLET | Refills: 11 | Status: SHIPPED | OUTPATIENT
Start: 2018-07-09 | End: 2018-08-23

## 2018-07-08 RX ADMIN — LABETALOL HCL 400 MG: 100 TABLET, FILM COATED ORAL at 02:07

## 2018-07-08 RX ADMIN — TACROLIMUS 7 MG: 5 CAPSULE ORAL at 09:07

## 2018-07-08 RX ADMIN — LABETALOL HCL 400 MG: 100 TABLET, FILM COATED ORAL at 05:07

## 2018-07-08 RX ADMIN — FAMOTIDINE 20 MG: 20 TABLET ORAL at 09:07

## 2018-07-08 RX ADMIN — HYDRALAZINE HYDROCHLORIDE 100 MG: 50 TABLET ORAL at 02:07

## 2018-07-08 RX ADMIN — LISINOPRIL 40 MG: 20 TABLET ORAL at 09:07

## 2018-07-08 RX ADMIN — MYCOPHENOLATE MOFETIL 1000 MG: 250 CAPSULE ORAL at 09:07

## 2018-07-08 RX ADMIN — NIFEDIPINE 120 MG: 30 TABLET, FILM COATED, EXTENDED RELEASE ORAL at 09:07

## 2018-07-08 RX ADMIN — HYDRALAZINE HYDROCHLORIDE 100 MG: 50 TABLET ORAL at 05:07

## 2018-07-08 RX ADMIN — CITALOPRAM HYDROBROMIDE 20 MG: 10 TABLET ORAL at 09:07

## 2018-07-08 RX ADMIN — CINACALCET HYDROCHLORIDE 30 MG: 30 TABLET, COATED ORAL at 09:07

## 2018-07-08 RX ADMIN — Medication 1 CAPSULE: at 09:07

## 2018-07-08 NOTE — NURSING
Patient requested cereal and milk for breakfast.  Per Dietary Services patients on renal diet should not consume milk.  Paged team.

## 2018-07-08 NOTE — CONSULTS
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 Hepatology is being consulted for management of immunosuppression.    Patient is a poor history and cannot even provide a list of her medications however reports continued compliance.  Per review of her chart we do know that she is on Tacrolimus 7 mg PO BID and has had ongoing issues with undetectable levels and non-compliance. Now in regards to her Cellcept her recent Transplant clinic notes do not mention this medication but she stated that she is taking 1000 mg PO BID (actually initially stated that she was only taking 1 pill BID unknown if its was 250 or 500).    Review of Systems   Constitutional: Negative for chills, fatigue and fever.   HENT: Negative for trouble swallowing and voice change.    Eyes: Negative.    Respiratory: Negative.    Cardiovascular: Negative.    Gastrointestinal: Negative.    Endocrine: Negative.    Genitourinary: Positive for vaginal bleeding.   Neurological: Negative.    Hematological: Negative.        Past Medical History:   Diagnosis Date    Anemia in ESRD (end-stage renal disease) 10/12/2015    dialysis tues, thursday, sat; access left arm    Chronic rejection of liver transplant 3/22/2016    Depression     Encounter for blood transfusion     ESRD on hemodialysis 9/30/2015    History of recent hospitalization 05/2018    pneumonia     History of splenomegaly 2016    Immunosuppressed 2017    Iron deficiency anemia secondary to inadequate dietary iron intake 2017    [facility].   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 ()    Moderate protein-calorie malnutrition 2017    MRSA bacteremia 2017    Pneumonia     Prophylactic immunotherapy 2014    Renovascular hypertension 10/2/2015    Secondary hyperparathyroidism 2017    Seizures     Sialadenitis 3/21/2018    Thrombocytopenia 2016       Past Surgical History:   Procedure Laterality Date     SECTION      x 2    CONIZATION OF CERVIX USING LOOP ELECTROSURGICAL EXCISION PROCEDURE (LEEP) N/A 6/15/2018    Procedure: CONIZATION-CERVICAL-LEEP;  Surgeon: Neelam Marroquin MD;  Location: [facility] OR;  Service: OB/GYN;  Laterality: N/A;    EXAMINATION UNDER ANESTHESIA N/A 2018    Procedure: Exam under anesthesia;  Surgeon: Neelam Marroquin MD;  Location: [facility] OR;  Service: OB/GYN;  Laterality: N/A;    LIVER BIOPSY      LIVER TRANSPLANT  1992    PERINEORRHAPHY  2018    Procedure: SUTURE REPAIR,CERVIX;  Surgeon: Neelam Marroquin MD;  Location: [facility] OR;  Service: OB/GYN;;    TUBAL LIGATION         Family history of liver disease: No    Review of patient's allergies indicates:   Allergen Reactions    Chloral hydrate      Other reaction(s): Hallucinations  Other reaction(s): Hives    Hydrocodone Other (See Comments)     Mental status changes       Social History Main Topics    Smoking status: Never Smoker    Smokeless tobacco: Never Used    Alcohol use No    Drug use: No    Sexual activity: No       Prescriptions Prior to Admission   Medication Sig Dispense Refill Last Dose    acetaminophen-codeine 300-30mg (TYLENOL #3) 300-30 mg Tab Take 1 tablet by mouth every 6 (six) hours as needed. 10 tablet 0 Past Month    aspirin 81 MG Chew Take 1  tablet (81 mg total) by mouth once daily.  0 7/6/2018    calcitRIOL (ROCALTROL) 0.25 MCG Cap Take 1 capsule (0.25 mcg total) by mouth every Mon, Wed, Fri. 12 capsule 0 7/6/2018    cinacalcet (SENSIPAR) 30 MG Tab Take 1 tablet (30 mg total) by mouth daily with breakfast. (Patient taking differently: Take 30 mg by mouth. On Tuesday, Thursday, and Saturday with dialysis) 30 tablet 11 7/6/2018    citalopram (CELEXA) 20 MG tablet TAKE 1 TABLET BY MOUTH EVERY DAY 30 tablet 1 7/6/2018    cloNIDine 0.3 mg/24 hr td ptwk (CATAPRES) 0.3 mg/24 hr Place 1 patch onto the skin every 7 days. 4 patch 11 7/6/2018    famotidine (PEPCID) 40 MG tablet Take 0.5 tablets (20 mg total) by mouth once daily. 15 tablet 11 7/6/2018    hydrALAZINE (APRESOLINE) 100 MG tablet Take 1 tablet (100 mg total) by mouth every 8 (eight) hours. 90 tablet 0 7/6/2018    labetalol (NORMODYNE) 200 MG tablet Take 2 tablets (400 mg total) by mouth every 8 (eight) hours. (Patient taking differently: Take 400 mg by mouth every 12 (twelve) hours. ) 360 tablet 11 7/6/2018    lisinopril (PRINIVIL,ZESTRIL) 40 MG tablet Take 1 tablet (40 mg total) by mouth once daily. 30 tablet 0 7/6/2018    loratadine (CLARITIN) 10 mg tablet Take 1 tablet (10 mg total) by mouth once daily. 30 tablet 0 7/6/2018    montelukast (SINGULAIR) 10 mg tablet Take 1 tablet (10 mg total) by mouth every evening. 30 tablet 0 7/6/2018    predniSONE (DELTASONE) 1 MG tablet Take 1 mg by mouth every other day.   7/5/2018    tacrolimus (PROGRAF) 1 MG Cap Take 7 capsules (7 mg total) by mouth every 12 (twelve) hours. 420 capsule 11 7/6/2018    vitamin renal formula, B-complex-vitamin c-folic acid, (NEPHROCAP) 1 mg Cap Take 1 capsule by mouth once daily. 30 capsule 0 7/6/2018    [DISCONTINUED] NIFEdipine (PROCARDIA-XL) 90 MG (OSM) 24 hr tablet Take 1 tablet (90 mg total) by mouth once daily. 30 tablet 11 7/6/2018    food supplemt, lactose-reduced (ENSURE ACTIVE HIGH PROTEIN) Liqd Take 236  mLs by mouth 2 (two) times daily. 60 Can 6 Unknown    mycophenolate (CELLCEPT) 250 mg Cap Take 1,000 mg by mouth 2 (two) times daily.   Unknown    ondansetron (ZOFRAN) 4 MG tablet Take 1 tablet (4 mg total) by mouth every 6 (six) hours as needed for Nausea. (Patient taking differently: Take 4 mg by mouth once daily. ) 15 tablet 0 More than a month    triamcinolone acetonide 0.1% (KENALOG) 0.1 % ointment AAA on arms, legs, and neck bid x 1-2 wks then prn flares only (Patient taking differently: Apply to affected area(s) on arms, legs, and neck twice daily x 1-2 wks then as needed for flares only) 80 g 3 Unknown       Objective:     Vital Signs (Most Recent):  Temp: 97.8 °F (36.6 °C) (07/08/18 1419)  Pulse: 78 (07/08/18 1420)  Resp: 18 (07/08/18 1419)  BP: (!) 158/97 (07/08/18 1419)  SpO2: 99 % (07/08/18 1419) Vital Signs (24h Range):  Temp:  [97.7 °F (36.5 °C)-98.9 °F (37.2 °C)] 97.8 °F (36.6 °C)  Pulse:  [] 78  Resp:  [16-20] 18  SpO2:  [97 %-99 %] 99 %  BP: (137-216)/() 158/97     Weight: 52.8 kg (116 lb 6.5 oz) (07/06/18 2300)  Body mass index is 19.37 kg/m².    Physical Exam   Constitutional: She is oriented to person, place, and time. No distress.   HENT:   Head: Normocephalic and atraumatic.   Eyes: No scleral icterus.   Cardiovascular: Normal rate and regular rhythm.    Pulmonary/Chest: Effort normal and breath sounds normal.   Abdominal: Bowel sounds are normal. She exhibits no distension. There is no tenderness.   Musculoskeletal: She exhibits no edema.   Neurological: She is alert and oriented to person, place, and time.   Skin: She is diaphoretic.       MELD-Na score: 21 at 6/20/2018 10:14 AM  MELD score: 20 at 6/20/2018 10:14 AM  Calculated from:  Serum Creatinine: 0  Serum Sodium: 136 mmol/L at 6/20/2018 10:14 AM  Total Bilirubin: 0.5 mg/dL (Rounded to 1) at 6/19/2018 12:20 PM  INR(ratio): 1 at 6/19/2018 12:20 PM  Age: 27 years    Significant Labs:  CBC:   Recent Labs  Lab 07/08/18  0604    WBC 5.43   RBC 3.05*   HGB 8.8*   HCT 26.9*   PLT 79*     CMP:   Recent Labs  Lab 07/06/18  1222  07/08/18  0604     < > 97   CALCIUM 8.1*  < > 8.4*   ALBUMIN 2.7*  --   --    PROT 7.5  --   --      < > 137   K 3.6  < > 4.6   CO2 28  < > 24     < > 106   BUN 26*  < > 16   CREATININE 5.5*  < > 4.2*   ALKPHOS 588*  --   --    ALT 32  --   --    AST 56*  --   --    BILITOT 0.6  --   --    < > = values in this interval not displayed.  Coagulation: No results for input(s): PT, INR, APTT in the last 168 hours.    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 Hepatology is being consulted for management of immunosuppression.    Patient is a poor historian with poor knowledge about her medications. For now OK to continued both Tacrolimus and Cellcept. We however question if she even need to be on Cellcept but will touch base with OP Hepatologist in the AM. We don't see Cellcept listed on the notes and under medications it appears as a historian medication with no active Hepatology attending writing for medication.    Recommendations:  --Daily CMP, CBC, and INR  --Daily Tacrolimus  --Continue current dose of Tacrolimus  --Continue Cellcept for now but will need to clarify with her hepatologist is this is part of her home region as there is no clear documentation.            Thank you for your consult. I will follow-up with patient. Please contact us if you have any additional questions.    Elinor Medeiros M.D.  Gastroenterology Fellow, PGY-V  Pager: [phone]  [facility]

## 2018-07-08 NOTE — HOSPITAL COURSE
The patient was admitted for anemia and required a total of 4 units prbc during her hospitalization. She continues to have heavy vaginal bleeding and the case was briefly discussed with gyn. She received treatment during her last hospitalization with packing and sutures. She will follow up with gyn. Although she continues to bleed, she does report that it has improved since her LEEP procedure. The patient's BP control proved to be a challenge during her hospitalization. She did not require ICU admit this time. Her nicardipine was increased to 120 mg. She received HD as scheduled. Her BPs were able to come under good control eventually. On the day of dc she was not reporting any headaches of nausea.

## 2018-07-08 NOTE — ASSESSMENT & PLAN NOTE
- BP seems to be improving overnight and into today  - continue current regimen  - continue to monitor

## 2018-07-08 NOTE — ASSESSMENT & PLAN NOTE
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 Hepatology is being consulted for management of immunosuppression.    Patient is a poor historian with poor knowledge about her medications. For now OK to continued both Tacrolimus and Cellcept. We however question if she even need to be on Cellcept but will touch base with OP Hepatologist in the AM. We don't see Cellcept listed on the notes and under medications it appears as a historian medication with no active Hepatology attending writing for medication.    Recommendations:  --Daily CMP, CBC, and INR  --Daily Tacrolimus  --Continue current dose of Tacrolimus  --Continue Cellcept for now but will need to clarify with her hepatologist is this is part of her home region as there is no clear documentation.

## 2018-07-08 NOTE — SUBJECTIVE & OBJECTIVE
Review of Systems   Constitutional: Negative for chills, fatigue and fever.   HENT: Negative for trouble swallowing and voice change.    Eyes: Negative.    Respiratory: Negative.    Cardiovascular: Negative.    Gastrointestinal: Negative.    Endocrine: Negative.    Genitourinary: Positive for vaginal bleeding.   Neurological: Negative.    Hematological: Negative.        Past Medical History:   Diagnosis Date    Anemia in ESRD (end-stage renal disease) 10/12/2015    dialysis es, thursday, sat; access left arm    Chronic rejection of liver transplant 3/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 ()    Moderate protein-calorie malnutrition 2017    MRSA bacteremia 2017    Pneumonia     Prophylactic immunotherapy 2014    Renovascular hypertension 10/2/2015    Secondary hyperparathyroidism 2017    Seizures     Sialadenitis 3/21/2018    Thrombocytopenia 2016       Past Surgical History:   Procedure Laterality Date     SECTION      x 2    CONIZATION OF CERVIX USING LOOP ELECTROSURGICAL EXCISION PROCEDURE (LEEP) N/A 6/15/2018    Procedure: CONIZATION-CERVICAL-LEEP;  Surgeon: Neelam Marroquin MD;  Location: [facility];  Service: OB/GYN;  Laterality: N/A;    EXAMINATION UNDER ANESTHESIA N/A 2018    Procedure: Exam under anesthesia;  Surgeon: Neelam Marroquin MD;  Location: [facility] OR;  Service: OB/GYN;  Laterality: N/A;    LIVER BIOPSY      LIVER TRANSPLANT  1992    PERINEORRHAPHY  2018    Procedure: SUTURE REPAIR,CERVIX;  Surgeon: Neelam Marroquin MD;  Location: [facility]  OR;  Service: OB/GYN;;    TUBAL LIGATION  2010       Family history of liver disease: No    Review of patient's allergies indicates:   Allergen Reactions    Chloral hydrate      Other reaction(s): Hallucinations  Other reaction(s): Hives    Hydrocodone Other (See Comments)     Mental status changes       Social History Main Topics    Smoking status: Never Smoker    Smokeless tobacco: Never Used    Alcohol use No    Drug use: No    Sexual activity: No       Prescriptions Prior to Admission   Medication Sig Dispense Refill Last Dose    acetaminophen-codeine 300-30mg (TYLENOL #3) 300-30 mg Tab Take 1 tablet by mouth every 6 (six) hours as needed. 10 tablet 0 Past Month    aspirin 81 MG Chew Take 1 tablet (81 mg total) by mouth once daily.  0 7/6/2018    calcitRIOL (ROCALTROL) 0.25 MCG Cap Take 1 capsule (0.25 mcg total) by mouth every Mon, Wed, Fri. 12 capsule 0 7/6/2018    cinacalcet (SENSIPAR) 30 MG Tab Take 1 tablet (30 mg total) by mouth daily with breakfast. (Patient taking differently: Take 30 mg by mouth. On Tuesday, Thursday, and Saturday with dialysis) 30 tablet 11 7/6/2018    citalopram (CELEXA) 20 MG tablet TAKE 1 TABLET BY MOUTH EVERY DAY 30 tablet 1 7/6/2018    cloNIDine 0.3 mg/24 hr td ptwk (CATAPRES) 0.3 mg/24 hr Place 1 patch onto the skin every 7 days. 4 patch 11 7/6/2018    famotidine (PEPCID) 40 MG tablet Take 0.5 tablets (20 mg total) by mouth once daily. 15 tablet 11 7/6/2018    hydrALAZINE (APRESOLINE) 100 MG tablet Take 1 tablet (100 mg total) by mouth every 8 (eight) hours. 90 tablet 0 7/6/2018    labetalol (NORMODYNE) 200 MG tablet Take 2 tablets (400 mg total) by mouth every 8 (eight) hours. (Patient taking differently: Take 400 mg by mouth every 12 (twelve) hours. ) 360 tablet 11 7/6/2018    lisinopril (PRINIVIL,ZESTRIL) 40 MG tablet Take 1 tablet (40 mg total) by mouth once daily. 30 tablet 0 7/6/2018    loratadine (CLARITIN) 10 mg tablet Take 1 tablet (10 mg total) by  mouth once daily. 30 tablet 0 7/6/2018    montelukast (SINGULAIR) 10 mg tablet Take 1 tablet (10 mg total) by mouth every evening. 30 tablet 0 7/6/2018    predniSONE (DELTASONE) 1 MG tablet Take 1 mg by mouth every other day.   7/5/2018    tacrolimus (PROGRAF) 1 MG Cap Take 7 capsules (7 mg total) by mouth every 12 (twelve) hours. 420 capsule 11 7/6/2018    vitamin renal formula, B-complex-vitamin c-folic acid, (NEPHROCAP) 1 mg Cap Take 1 capsule by mouth once daily. 30 capsule 0 7/6/2018    [DISCONTINUED] NIFEdipine (PROCARDIA-XL) 90 MG (OSM) 24 hr tablet Take 1 tablet (90 mg total) by mouth once daily. 30 tablet 11 7/6/2018    food supplemt, lactose-reduced (ENSURE ACTIVE HIGH PROTEIN) Liqd Take 236 mLs by mouth 2 (two) times daily. 60 Can 6 Unknown    mycophenolate (CELLCEPT) 250 mg Cap Take 1,000 mg by mouth 2 (two) times daily.   Unknown    ondansetron (ZOFRAN) 4 MG tablet Take 1 tablet (4 mg total) by mouth every 6 (six) hours as needed for Nausea. (Patient taking differently: Take 4 mg by mouth once daily. ) 15 tablet 0 More than a month    triamcinolone acetonide 0.1% (KENALOG) 0.1 % ointment AAA on arms, legs, and neck bid x 1-2 wks then prn flares only (Patient taking differently: Apply to affected area(s) on arms, legs, and neck twice daily x 1-2 wks then as needed for flares only) 80 g 3 Unknown       Objective:     Vital Signs (Most Recent):  Temp: 97.8 °F (36.6 °C) (07/08/18 1419)  Pulse: 78 (07/08/18 1420)  Resp: 18 (07/08/18 1419)  BP: (!) 158/97 (07/08/18 1419)  SpO2: 99 % (07/08/18 1419) Vital Signs (24h Range):  Temp:  [97.7 °F (36.5 °C)-98.9 °F (37.2 °C)] 97.8 °F (36.6 °C)  Pulse:  [] 78  Resp:  [16-20] 18  SpO2:  [97 %-99 %] 99 %  BP: (137-216)/() 158/97     Weight: 52.8 kg (116 lb 6.5 oz) (07/06/18 2300)  Body mass index is 19.37 kg/m².    Physical Exam   Constitutional: She is oriented to person, place, and time. No distress.   HENT:   Head: Normocephalic and atraumatic.    Eyes: No scleral icterus.   Cardiovascular: Normal rate and regular rhythm.    Pulmonary/Chest: Effort normal and breath sounds normal.   Abdominal: Bowel sounds are normal. She exhibits no distension. There is no tenderness.   Musculoskeletal: She exhibits no edema.   Neurological: She is alert and oriented to person, place, and time.   Skin: She is diaphoretic.       MELD-Na score: 21 at 6/20/2018 10:14 AM  MELD score: 20 at 6/20/2018 10:14 AM  Calculated from:  Serum Creatinine: 0  Serum Sodium: 136 mmol/L at 6/20/2018 10:14 AM  Total Bilirubin: 0.5 mg/dL (Rounded to 1) at 6/19/2018 12:20 PM  INR(ratio): 1 at 6/19/2018 12:20 PM  Age: 27 years    Significant Labs:  CBC:   Recent Labs  Lab 07/08/18  0604   WBC 5.43   RBC 3.05*   HGB 8.8*   HCT 26.9*   PLT 79*     CMP:   Recent Labs  Lab 07/06/18  1222  07/08/18  0604     < > 97   CALCIUM 8.1*  < > 8.4*   ALBUMIN 2.7*  --   --    PROT 7.5  --   --      < > 137   K 3.6  < > 4.6   CO2 28  < > 24     < > 106   BUN 26*  < > 16   CREATININE 5.5*  < > 4.2*   ALKPHOS 588*  --   --    ALT 32  --   --    AST 56*  --   --    BILITOT 0.6  --   --    < > = values in this interval not displayed.  Coagulation: No results for input(s): PT, INR, APTT in the last 168 hours.    Significant Imaging:  No recent imaging on this admission

## 2018-07-08 NOTE — DISCHARGE SUMMARY
[facility] Hospital Medicine  Discharge Summary      Patient Name: Holly Patel  MRN: [id number]  Admission Date: 7/6/2018  Hospital Length of Stay: 2 days  Discharge Date and Time:  07/08/2018 1:06 PM  Attending Physician: Zeenat Almanza MD   Discharging Provider: Zeenat Almanza MD  Primary Care Provider: Stan Sosa MD  Hospital Medicine Team: [facility] Zeenat Almanza MD    HPI:   The patient is a 28 y/o female with PMH of liver transplant since age 1, on immunosuppression, ESRD on HD TTS, and poorly controlled BP. She was sent by HD after she was noted to have low Hb. She recently underwent a LEEP procedure in mid June with subsequent bleeding. During her most recent admit at the end of June for HTN emergency, during which she required ICU admission, and was also seen by gyn at that time for her continued vaginal bleed.Vaginal packing and cervical sutures were placed and her H&H stabilized. She reports today that her bleeding has improved but she does continue to bleed. She denies any blood in her stool or other sources of bleeding, SOB, or CP, HA, or blurry visions. BPs also found to be elevated in the ER. She states that her BPs always run high.         * No surgery found *      Hospital Course:   The patient was admitted for anemia and required a total of 4 units prbc during her hospitalization. She continues to have heavy vaginal bleeding and the case was briefly discussed with gyn. She received treatment during her last hospitalization with packing and sutures. She will follow up with gyn. Although she continues to bleed, she does report that it has improved since her LEEP procedure. The patient's BP control proved to be a challenge during her hospitalization. She did not require ICU admit this time. Her nicardipine was increased to 120 mg. She received HD as scheduled. Her BPs were able to come under good control eventually. On the day of dc she was not reporting any  headaches of nausea.       Consults:   Consults         Status Ordering Provider     Inpatient consult to Hepatology  Once     Provider:  (Not yet assigned)    Acknowledged YULISA GILLIAM     Inpatient consult to Nephrology  Once     Provider:  (Not yet assigned)    Completed SHAMA NI     Inpatient consult to Social Work/Case Management  Once     Provider:  (Not yet assigned)    Acknowledged YULISA GILLIAM     IP consult to case management  Once     Provider:  (Not yet assigned)    Acknowledged YULISA GILLIAM          * Acute blood loss anemia    - appropriate response to PRBC transfusion on HD  - now received a total of 4 unis  - will continue to monitor today  - follow up with gyn  - continue pad count             ESRD (end stage renal disease) on dialysis    - continue HD as scheduled on TTS  - continue cinacalcet and calcitriol          Renovascular hypertension    - BP seems to be improving overnight and into today  - continue current regimen  - continue to monitor           Liver replaced by transplant    - continue immunosuppressive therapy with cellcept and prograf  - hepatology consult for immunosuppressive management           Final Active Diagnoses:    Diagnosis Date Noted POA    PRINCIPAL PROBLEM:  Acute blood loss anemia [D62] 10/12/2015 Yes    ESRD (end stage renal disease) on dialysis [N18.6, Z99.2]  Not Applicable    Vagina bleeding [N93.9] 10/11/2015 Yes    Renovascular hypertension [I15.0] 10/02/2015 Yes     Chronic    Prophylactic immunotherapy [Z29.8] 08/04/2014 Not Applicable   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 [Z94.4] 09/10/2012 Not Applicable     Chronic      Problems Resolved During this Admission:    Diagnosis Date Noted Date Resolved POA       Discharged Condition: good    Disposition: Home or Self Care    Follow Up:    Patient Instructions:   No discharge procedures on file.    Significant Diagnostic Studies: none    Pending Diagnostic Studies:     None          Medications:  Reconciled Home Medications:      Medication List      CHANGE how you take these medications    cinacalcet 30 MG Tab  Commonly known as:  SENSIPAR  Take 1 tablet (30 mg total) by mouth daily with breakfast.  What changed:  · when to take this  · additional instructions     labetalol 200 MG tablet  Commonly known as:  NORMODYNE  Take 2 tablets (400 mg total) by mouth every 8 (eight) hours.  What changed:  when to take this     NIFEdipine 60 MG (OSM) 24 hr tablet  Commonly known as:  PROCARDIA-XL  Take 2 tablets (120 mg total) by mouth once daily.  Start taking on:  7/9/2018  What changed:  · medication strength  · how much to take     ondansetron 4 MG tablet  Commonly known as:  ZOFRAN  Take 1 tablet (4 mg total) by mouth every 6 (six) hours as needed for Nausea.  What changed:  when to take this     triamcinolone acetonide 0.1% 0.1 % ointment  Commonly known as:  KENALOG  AAA on arms, legs, and neck bid x 1-2 wks then prn flares only  What changed:  additional instructions        CONTINUE taking these medications    acetaminophen-codeine 300-30mg 300-30 mg Tab  Commonly known as:  TYLENOL #3  Take 1 tablet by mouth every 6 (six) hours as needed.     aspirin 81 MG Chew  Take 1 tablet (81 mg total) by mouth once daily.     calcitRIOL 0.25 MCG Cap  Commonly known as:  ROCALTROL  Take 1 capsule (0.25 mcg total) by mouth every Mon, Wed, Fri.     citalopram 20 MG tablet  Commonly known as:  CELEXA  TAKE 1 TABLET BY MOUTH EVERY DAY     cloNIDine 0.3 mg/24 hr td ptwk 0.3 mg/24 hr  Commonly known as:  CATAPRES  Place 1 patch onto the skin every 7 days.     famotidine 40 MG tablet  Commonly known as:  PEPCID  Take 0.5 tablets (20 mg total) by mouth once daily.     food supplemt, lactose-reduced Liqd  Commonly known as:  ENSURE ACTIVE HIGH PROTEIN  Take 236 mLs by mouth 2 (two) times daily.     hydrALAZINE 100 MG tablet  Commonly known as:  APRESOLINE  Take 1 tablet (100 mg total) by mouth every 8 (eight)  hours.     lisinopril 40 MG tablet  Commonly known as:  PRINIVIL,ZESTRIL  Take 1 tablet (40 mg total) by mouth once daily.     loratadine 10 mg tablet  Commonly known as:  CLARITIN  Take 1 tablet (10 mg total) by mouth once daily.     montelukast 10 mg tablet  Commonly known as:  SINGULAIR  Take 1 tablet (10 mg total) by mouth every evening.     mycophenolate 250 mg Cap  Commonly known as:  CELLCEPT  Take 1,000 mg by mouth 2 (two) times daily.     predniSONE 1 MG tablet  Commonly known as:  DELTASONE  Take 1 mg by mouth every other day.     tacrolimus 1 MG Cap  Commonly known as:  PROGRAF  Take 7 capsules (7 mg total) by mouth every 12 (twelve) hours.     vitamin renal formula (B-complex-vitamin c-folic acid) 1 mg Cap  Commonly known as:  NEPHROCAP  Take 1 capsule by mouth once daily.            Indwelling Lines/Drains at time of discharge:   Lines/Drains/Airways     Drain                 Hemodialysis AV Fistula Left forearm -- days                  Zeenat Almanza MD  Department of Hospital Medicine  [facility]

## 2018-07-08 NOTE — HPI
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 Hepatology is being consulted for management of immunosuppression.    Patient is a poor history and cannot even provide a list of her medications however reports continued compliance.  Per review of her chart we do know that she is on Tacrolimus 7 mg PO BID and has had ongoing issues with undetectable levels and non-compliance. Now in regards to her Cellcept her recent Transplant clinic notes do not mention this medication but she stated that she is taking 1000 mg PO BID (actually initially stated that she was only taking 1 pill BID unknown if its was 250 or 500).

## 2018-07-08 NOTE — PROGRESS NOTES
Chief Complaint   Patient presents with    Seizures        Holly Patel is a 27 y.o. female with a history of multiple medical diagnoses as listed below that presents for follow-up after being hospitalized for chest pain.  The patient had seizures while she was in the hospital was consulted to neurology today.  Of note the patient had recently been in the hospital due to uncontrolled blood pressure and has several seizures.  The patient initially was started on Keppra 500 mg twice a day, but when she returned to the hospital for the chest pain episode she had another witnessed seizure there.  She was started on Vimpat at that time.  MRI brain was obtained that showed no acute findings, while there was some concern for vasculitis.  CTA head was obtained at that time which showed no acute changes and no signs of vasculitis on that imaging scan.  Once on Vimpat the patient and no further seizure activity while in the hospital.  She was tolerating the medications well at the time of discharge.  She is accompanied to this visit by her mother.  The patient currently lives alone, but her family member calls come throughout the day.  She has been tolerating Vimpat well without any seizures since she has been discharged from the hospital.    Interval History  07/02/2018  She presents to clinic for routine follow-up.  She says that she has not had any seizures since she was last seen in clinic.  Has been prescribed Keppra and Vimpat and has not had any seizures in the time that she was started on these medications.  She is accompanied to this visit by her mother who helps to provide the history.  She has been most trouble about her inability is dry due to her seizure precautions, but otherwise she has been tolerating her medications well without any complaints of side effects. She has family support that counseling causing checks on to make sure that she is doing well were regards to her health.  She has not had  any concerns about syncopal events in the time since her last clinic visit.    PAST MEDICAL HISTORY:  Past Medical History:   Diagnosis Date    Anemia in ESRD (end-stage renal disease) 10/12/2015    dialysis es, thursday, sat; access left arm    Chronic rejection of liver transplant 3/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 ()    Moderate protein-calorie malnutrition 2017    MRSA bacteremia 2017    Pneumonia     Prophylactic immunotherapy 2014    Renovascular hypertension 10/2/2015    Secondary hyperparathyroidism 2017    Seizures     Sialadenitis 3/21/2018    Thrombocytopenia 2016       PAST SURGICAL HISTORY:  Past Surgical History:   Procedure Laterality Date     SECTION      x 2    CONIZATION OF CERVIX USING LOOP ELECTROSURGICAL EXCISION PROCEDURE (LEEP) N/A 6/15/2018    Procedure: CONIZATION-CERVICAL-LEEP;  Surgeon: Neelam Marroquin MD;  Location: [facility];  Service: OB/GYN;  Laterality: N/A;    EXAMINATION UNDER ANESTHESIA N/A 2018    Procedure: Exam under anesthesia;  Surgeon: Neelam Marroquin MD;  Location: [facility];  Service: OB/GYN;  Laterality: N/A;    LIVER BIOPSY      LIVER TRANSPLANT  1992    PERINEORRHAPHY  2018    Procedure: SUTURE REPAIR,CERVIX;  Surgeon: Neelam Marroquin MD;  Location: [facility];  Service: OB/GYN;;    TUBAL LIGATION         SOCIAL HISTORY:  Social History     Social History    Marital status: Legally      Spouse name: N/A    Number of children: N/A    Years of education: N/A     Occupational History     Not on file.     Social History Main Topics    Smoking status: Never Smoker    Smokeless tobacco: Never Used    Alcohol use No    Drug use: No    Sexual activity: No     Other Topics Concern    Are You Pregnant Or Think You May Be? No    Breast-Feeding No     Social History Narrative    Lives 2 kids, 7 and 8, and nephew. Her mom helps with kids.       FAMILY HISTORY:  Family History   Problem Relation Age of Onset    Hypertension Mother     Hypertension Father     Melanoma Neg Hx     Breast cancer Neg Hx     Colon cancer Neg Hx     Ovarian cancer Neg Hx        ALLERGIES AND MEDICATIONS: updated and reviewed.  Review of patient's allergies indicates:   Allergen Reactions    Chloral hydrate      Other reaction(s): Hallucinations  Other reaction(s): Hives    Hydrocodone Other (See Comments)     Mental status changes     No current facility-administered medications for this visit.      No current outpatient prescriptions on file.     Facility-Administered Medications Ordered in Other Visits   Medication Dose Route Frequency Provider Last Rate Last Dose    0.9%  NaCl infusion (for blood administration)   Intravenous Q24H PRN Philip Ramos MD        0.9%  NaCl infusion (for blood administration)   Intravenous Q24H PRN Zeenat Almanza MD        acetaminophen tablet 650 mg  650 mg Oral Q6H PRN Amy Garcia PA-C   650 mg at 07/06/18 2314    calcitRIOL capsule 0.25 mcg  0.25 mcg Oral Every Mon, Wed, Fri Zeenat Almanza MD        cinacalcet tablet 30 mg  30 mg Oral Daily with breakfast Zeenat Almanza MD        citalopram tablet 20 mg  20 mg Oral Daily Zeenat Almanza MD        cloNIDine 0.3 mg/24 hr td ptwk 1 patch  1 patch Transdermal Q7 Days Zeenat Almanza MD   1 patch at 07/07/18 0241    dextrose 50% injection 12.5 g  12.5 g Intravenous PRN Zeenat Almanza MD        dextrose 50% injection 25 g  25 g Intravenous PRN Zeenat Almanza MD        epoetin anca injection 10,000  Units  10,000 Units Intravenous Every Tues, Thurs, Sat Raheem Cifuentes, ROXIE        famotidine tablet 20 mg  20 mg Oral Daily Zeenat Almanza MD        glucagon (human recombinant) injection 1 mg  1 mg Intramuscular PRN Zeenat Almanza MD        glucose chewable tablet 16 g  16 g Oral PRN Zeenat Almanza MD        glucose chewable tablet 24 g  24 g Oral PRN Zeenat Almanza MD        hydrALAZINE tablet 100 mg  100 mg Oral Q8H Zeenat Almanza MD   100 mg at 07/08/18 0528    labetalol injection 10 mg  10 mg Intravenous Q4H PRN Zeenat Almanza MD   10 mg at 07/07/18 1732    labetalol tablet 400 mg  400 mg Oral Q8H Zeenat Almanza MD   400 mg at 07/08/18 0528    lisinopril tablet 40 mg  40 mg Oral Daily Zeenat Almanza MD   40 mg at 07/07/18 0905    montelukast tablet 10 mg  10 mg Oral QHS Zeenat Almanza MD   10 mg at 07/07/18 2130    mycophenolate capsule 1,000 mg  1,000 mg Oral BID Zeenat Almanza MD   1,000 mg at 07/07/18 2130    NIFEdipine 24 hr tablet 120 mg  120 mg Oral Daily Zeenat Almanza MD        ondansetron disintegrating tablet 4 mg  4 mg Oral Q8H PRN Zeenat Almanza MD        ondansetron tablet 4 mg  4 mg Oral Q6H PRN Zeenat Almanza MD   4 mg at 07/07/18 1553    prochlorperazine injection Soln 5 mg  5 mg Intravenous Q6H PRN Zeenat Almanza MD   5 mg at 07/07/18 1839    sodium chloride 0.9% flush 5 mL  5 mL Intravenous PRN Zeenat Almanza MD        tacrolimus capsule 7 mg  7 mg Oral BID Zeenat Almanza MD   Stopped at 07/07/18 0800    vitamin renal formula (B-complex-vitamin c-folic acid) 1 mg per capsule 1 capsule  1 capsule Oral Daily Zeenat Almanza MD           Review of Systems   Constitutional: Negative for activity change, appetite change, fever and unexpected weight change.   HENT: Negative for trouble swallowing and voice change.    Eyes: Negative for photophobia and visual disturbance.   Respiratory: Negative for apnea and shortness of breath.    Cardiovascular:  Negative for chest pain and leg swelling.   Gastrointestinal: Negative for constipation and nausea.   Genitourinary: Negative for difficulty urinating.   Musculoskeletal: Negative for back pain, gait problem and neck pain.   Skin: Negative for color change and pallor.   Neurological: Positive for seizures. Negative for dizziness, syncope, weakness and numbness.   Hematological: Negative for adenopathy.   Psychiatric/Behavioral: Negative for agitation, confusion and decreased concentration.       Neurologic Exam     Mental Status   Oriented to person, place, and time.   Registration: recalls 3 of 3 objects.   Attention: normal. Concentration: normal.   Speech: speech is normal   Level of consciousness: alert  Knowledge: good.     Cranial Nerves     CN II   Visual fields full to confrontation.   Right visual field deficit: none  Left visual field deficit: none     CN III, IV, VI   Pupils are equal, round, and reactive to light.  Extraocular motions are normal.   Right pupil: Size: 3 mm. Shape: regular. Accommodation: intact.   Left pupil: Size: 3 mm. Shape: regular. Accommodation: intact.   CN III: no CN III palsy  CN VI: no CN VI palsy  Nystagmus: none   Diplopia: none  Ophthalmoparesis: none  Upgaze: normal  Downgaze: normal  Conjugate gaze: present    CN V   Facial sensation intact.   Right facial sensation deficit: none  Left facial sensation deficit: none    CN VII   Facial expression full, symmetric.   Right facial weakness: none  Left facial weakness: none    CN VIII   CN VIII normal.     CN IX, X   CN IX normal.   CN X normal.   Palate: symmetric    CN XI   CN XI normal.   Right sternocleidomastoid strength: normal  Left sternocleidomastoid strength: normal  Right trapezius strength: normal  Left trapezius strength: normal    CN XII   CN XII normal.   Tongue deviation: none    Motor Exam   Muscle bulk: normal  Overall muscle tone: normal  Right arm tone: normal  Left arm tone: normal  Right leg tone:  normal  Left leg tone: normal    Strength   Strength 5/5 throughout.     Sensory Exam   Right arm light touch: normal  Left arm light touch: normal  Right leg light touch: normal  Left leg light touch: normal  Right arm vibration: normal  Left arm vibration: normal  Right leg vibration: normal  Left leg vibration: normal  Right arm proprioception: normal  Left arm proprioception: normal  Right leg proprioception: normal  Left leg proprioception: normal  Right arm pinprick: normal  Left arm pinprick: normal  Right leg pinprick: normal  Left leg pinprick: normal    Gait, Coordination, and Reflexes     Gait  Gait: normal    Coordination   Romberg: negative  Finger to nose coordination: normal  Heel to shin coordination: normal  Tandem walking coordination: normal    Tremor   Resting tremor: absent    Reflexes   Right brachioradialis: 2+  Left brachioradialis: 2+  Right biceps: 2+  Left biceps: 2+  Right triceps: 2+  Left triceps: 2+  Right patellar: 2+  Left patellar: 2+  Right achilles: 2+  Left achilles: 2+  Right plantar: normal  Left plantar: normal      Physical Exam   Constitutional: She is oriented to person, place, and time. She appears well-developed and well-nourished.   HENT:   Head: Normocephalic and atraumatic.   Eyes: EOM are normal. Pupils are equal, round, and reactive to light.   Neck: Normal range of motion.   Cardiovascular: Normal rate and intact distal pulses.    Pulmonary/Chest: Effort normal. No apnea. No respiratory distress.   Musculoskeletal: Normal range of motion.   Neurological: She is alert and oriented to person, place, and time. She has normal strength. She has a normal Finger-Nose-Finger Test, a normal Heel to Shin Test, a normal Romberg Test and a normal Tandem Gait Test. Gait normal.   Reflex Scores:       Tricep reflexes are 2+ on the right side and 2+ on the left side.       Bicep reflexes are 2+ on the right side and 2+ on the left side.       Brachioradialis reflexes are 2+ on the  "right side and 2+ on the left side.       Patellar reflexes are 2+ on the right side and 2+ on the left side.       Achilles reflexes are 2+ on the right side and 2+ on the left side.  Skin: Skin is warm and dry.   Psychiatric: She has a normal mood and affect. Her speech is normal and behavior is normal. Thought content normal.   Vitals reviewed.      Vitals:    07/02/18 1347   BP: (!) 159/87   Pulse: 79   Weight: 52 kg (114 lb 10.2 oz)   Height: 5' 5" (1.651 m)       Assessment & Plan:    Problem List Items Addressed This Visit     Seizures - Primary        Discussed resuming driving privileges.  Patient will be about 6 months seizure free at the end of August at which point she will be able to drive with no restrictions.    Follow-up: Follow-up in about 6 months (around 1/2/2019).  More than 50% of this 25 minute encounter was spent in counseling and coordinating care.      "

## 2018-07-08 NOTE — PROGRESS NOTES
[facility] Hospital Medicine  Progress Note    Patient Name: Holly Patel  MRN: [id number]  Patient Class: IP- Inpatient   Admission Date: 7/6/2018  Length of Stay: 1 days  Attending Physician: Zeenat Almanza MD  Primary Care Provider: Stan Sosa MD    [facility] Medicine Team: [facility] MED G Zeenat Almanza MD    Subjective:     Principal Problem:Acute blood loss anemia    HPI:  The patient is a 28 y/o female with PMH of liver transplant since age 1, on immunosuppression, ESRD on HD TTS, and poorly controlled BP. She was sent by HD after she was noted to have low Hb. She recently underwent a LEEP procedure in mid June with subsequent bleeding. During her most recent admit at the end of June for HTN emergency, during which she required ICU admission, and was also seen by gyn at that time for her continued vaginal bleed.Vaginal packing and cervical sutures were placed and her H&H stabilized. She reports today that her bleeding has improved but she does continue to bleed. She denies any blood in her stool or other sources of bleeding, SOB, or CP, HA, or blurry visions. BPs also found to be elevated in the ER. She states that her BPs always run high.         Hospital Course:  No notes on file    Interval History: patient's bp control continued to be difficult; despite getting HD today she remains high; continues to bleed; states wearing two pads at times and now reporting HA with vomiting     Review of Systems   Constitutional: Negative for fever.   Eyes: Negative for visual disturbance.   Respiratory: Negative for cough and shortness of breath.    Cardiovascular: Negative for chest pain and palpitations.   Gastrointestinal: Positive for nausea and vomiting. Negative for abdominal pain.   Genitourinary: Positive for vaginal bleeding.   Neurological: Positive for headaches.     Objective:     Vital Signs (Most Recent):  Temp: 97.7 °F (36.5 °C) (07/07/18 1511)  Pulse: 97 (07/07/18  1900)  Resp: 20 (07/07/18 1725)  BP: (!) 205/126 (07/07/18 1725)  SpO2: 99 % (07/07/18 1725) Vital Signs (24h Range):  Temp:  [97.7 °F (36.5 °C)-98.3 °F (36.8 °C)] 97.7 °F (36.5 °C)  Pulse:  [] 97  Resp:  [16-20] 20  SpO2:  [94 %-100 %] 99 %  BP: (176-235)/() 205/126     Weight: 52.8 kg (116 lb 6.5 oz)  Body mass index is 19.37 kg/m².    Intake/Output Summary (Last 24 hours) at 07/07/18 2044  Last data filed at 07/07/18 1145   Gross per 24 hour   Intake          1067.25 ml   Output             2100 ml   Net         -1032.75 ml      Physical Exam   Constitutional: She is oriented to person, place, and time. She appears well-developed and well-nourished.   Seems a bit more uncomfortable today. Seen on HD.    Cardiovascular: Normal rate.    Pulmonary/Chest: Effort normal.   Abdominal: Soft.   Neurological: She is alert and oriented to person, place, and time.   Vitals reviewed.      Significant Labs: All pertinent labs within the past 24 hours have been reviewed.    Significant Imaging: I have reviewed all pertinent imaging results/findings within the past 24 hours.    Assessment/Plan:      * Acute blood loss anemia    - most likely due to continue vaginal bleeding although it is starting to decrease now  - patient had almost close to appropriate response but Hb still < 7 therefore will plan to transfuse 2 additional units on HD  - discussed with gyn: will continue to monitor and do pad count          Anemia              ESRD (end stage renal disease) on dialysis    - for HD today   - continue cinacalcet and calcitriol          Vagina bleeding    - per anemia           Renovascular hypertension    - BP still uncontrolled  - HD today   - given HA and vomiting, ICU was consulted but did not feel that patient requires ICU admit at this time; ICU to be reconsulted if she continues to worsen  - continue home medication regimen with increase in nifedipine dose  - per patient's nephrologist, this is very usual for  her but bps will eventually come under control           Prophylactic immunotherapy    - per liver transplant           Liver replaced by transplant    - continue immunosuppressive therapy with cellcept and prograf            VTE Risk Mitigation         Ordered     Place LINDA hose  Until discontinued      07/06/18 1530     Place sequential compression device  Until discontinued      07/06/18 1530     IP VTE HIGH RISK PATIENT  Once      07/06/18 1530              Zeenat Almanza MD  Department of Hospital Medicine   [facility]

## 2018-07-08 NOTE — SUBJECTIVE & OBJECTIVE
Interval History: patient's bp control continued to be difficult; despite getting HD today she remains high; continues to bleed; states wearing two pads at times and now reporting HA with vomiting     Review of Systems   Constitutional: Negative for fever.   Eyes: Negative for visual disturbance.   Respiratory: Negative for cough and shortness of breath.    Cardiovascular: Negative for chest pain and palpitations.   Gastrointestinal: Positive for nausea and vomiting. Negative for abdominal pain.   Genitourinary: Positive for vaginal bleeding.   Neurological: Positive for headaches.     Objective:     Vital Signs (Most Recent):  Temp: 97.7 °F (36.5 °C) (07/07/18 1511)  Pulse: 97 (07/07/18 1900)  Resp: 20 (07/07/18 1725)  BP: (!) 205/126 (07/07/18 1725)  SpO2: 99 % (07/07/18 1725) Vital Signs (24h Range):  Temp:  [97.7 °F (36.5 °C)-98.3 °F (36.8 °C)] 97.7 °F (36.5 °C)  Pulse:  [] 97  Resp:  [16-20] 20  SpO2:  [94 %-100 %] 99 %  BP: (176-235)/() 205/126     Weight: 52.8 kg (116 lb 6.5 oz)  Body mass index is 19.37 kg/m².    Intake/Output Summary (Last 24 hours) at 07/07/18 2044  Last data filed at 07/07/18 1145   Gross per 24 hour   Intake          1067.25 ml   Output             2100 ml   Net         -1032.75 ml      Physical Exam   Constitutional: She is oriented to person, place, and time. She appears well-developed and well-nourished.   Seems a bit more uncomfortable today. Seen on HD.    Cardiovascular: Normal rate.    Pulmonary/Chest: Effort normal.   Abdominal: Soft.   Neurological: She is alert and oriented to person, place, and time.   Vitals reviewed.      Significant Labs: All pertinent labs within the past 24 hours have been reviewed.    Significant Imaging: I have reviewed all pertinent imaging results/findings within the past 24 hours.

## 2018-07-08 NOTE — ASSESSMENT & PLAN NOTE
- continue immunosuppressive therapy with cellcept and prograf  - hepatology consult for immunosuppressive management

## 2018-07-08 NOTE — ASSESSMENT & PLAN NOTE
- appropriate response to PRBC transfusion on HD  - now received a total of 4 unis  - will continue to monitor today  - follow up with gyn  - continue pad count

## 2018-07-09 ENCOUNTER — ANESTHESIA (OUTPATIENT)
Dept: SURGERY | Facility: OTHER | Age: 28
DRG: 907 | End: 2018-07-09
Payer: MEDICARE

## 2018-07-09 ENCOUNTER — ANESTHESIA EVENT (OUTPATIENT)
Dept: SURGERY | Facility: OTHER | Age: 28
DRG: 907 | End: 2018-07-09
Payer: MEDICARE

## 2018-07-09 ENCOUNTER — SURGERY (OUTPATIENT)
Age: 28
End: 2018-07-09

## 2018-07-09 ENCOUNTER — HOSPITAL ENCOUNTER (INPATIENT)
Facility: OTHER | Age: 28
LOS: 1 days | Discharge: HOME | DRG: 907 | End: 2018-07-10
Attending: EMERGENCY MEDICINE | Admitting: OBSTETRICS & GYNECOLOGY
Payer: MEDICARE

## 2018-07-09 DIAGNOSIS — N93.9 VAGINAL BLEEDING: ICD-10-CM

## 2018-07-09 DIAGNOSIS — N99.820 POSTOPERATIVE VAGINAL BLEEDING FOLLOWING GENITOURINARY PROCEDURE: Primary | ICD-10-CM

## 2018-07-09 PROBLEM — Z01.818 PREOPERATIVE EXAM FOR GYNECOLOGIC SURGERY: Status: RESOLVED | Noted: 2018-06-15 | Resolved: 2018-07-09

## 2018-07-09 LAB
ABO + RH BLD: NORMAL
ALBUMIN SERPL BCP-MCNC: 2.7 G/DL
ALP SERPL-CCNC: 506 U/L
ALT SERPL W/O P-5'-P-CCNC: 33 U/L
ANION GAP SERPL CALC-SCNC: 10 MMOL/L
AST SERPL-CCNC: 46 U/L
BASOPHILS # BLD AUTO: 0.01 K/UL
BASOPHILS # BLD AUTO: 0.02 K/UL
BASOPHILS NFR BLD: 0.2 %
BASOPHILS NFR BLD: 0.4 %
BILIRUB SERPL-MCNC: 0.8 MG/DL
BLD GP AB SCN CELLS X3 SERPL QL: NORMAL
BUN SERPL-MCNC: 28 MG/DL
CALCIUM SERPL-MCNC: 8.6 MG/DL
CHLORIDE SERPL-SCNC: 106 MMOL/L
CO2 SERPL-SCNC: 23 MMOL/L
CREAT SERPL-MCNC: 6.2 MG/DL
DIFFERENTIAL METHOD: ABNORMAL
DIFFERENTIAL METHOD: ABNORMAL
EOSINOPHIL # BLD AUTO: 0.3 K/UL
EOSINOPHIL # BLD AUTO: 0.7 K/UL
EOSINOPHIL NFR BLD: 13.7 %
EOSINOPHIL NFR BLD: 5.9 %
ERYTHROCYTE [DISTWIDTH] IN BLOOD BY AUTOMATED COUNT: 16.6 %
ERYTHROCYTE [DISTWIDTH] IN BLOOD BY AUTOMATED COUNT: 16.8 %
EST. GFR  (AFRICAN AMERICAN): 10 ML/MIN/1.73 M^2
EST. GFR  (NON AFRICAN AMERICAN): 9 ML/MIN/1.73 M^2
GLUCOSE SERPL-MCNC: 100 MG/DL
HCG INTACT+B SERPL-ACNC: <1.2 MIU/ML
HCT VFR BLD AUTO: 21 %
HCT VFR BLD AUTO: 25.9 %
HGB BLD-MCNC: 6.7 G/DL
HGB BLD-MCNC: 8.4 G/DL
INR PPP: 1
LYMPHOCYTES # BLD AUTO: 0.4 K/UL
LYMPHOCYTES # BLD AUTO: 0.9 K/UL
LYMPHOCYTES NFR BLD: 16.9 %
LYMPHOCYTES NFR BLD: 8.4 %
MCH RBC QN AUTO: 28 PG
MCH RBC QN AUTO: 28.4 PG
MCHC RBC AUTO-ENTMCNC: 31.9 G/DL
MCHC RBC AUTO-ENTMCNC: 32.4 G/DL
MCV RBC AUTO: 88 FL
MCV RBC AUTO: 88 FL
MONOCYTES # BLD AUTO: 0.1 K/UL
MONOCYTES # BLD AUTO: 0.3 K/UL
MONOCYTES NFR BLD: 2.1 %
MONOCYTES NFR BLD: 6.1 %
NEUTROPHILS # BLD AUTO: 3.2 K/UL
NEUTROPHILS # BLD AUTO: 4.4 K/UL
NEUTROPHILS NFR BLD: 62.7 %
NEUTROPHILS NFR BLD: 83.2 %
PLATELET # BLD AUTO: 65 K/UL
PLATELET # BLD AUTO: 83 K/UL
PMV BLD AUTO: 9 FL
PMV BLD AUTO: 9.5 FL
POTASSIUM SERPL-SCNC: 4.7 MMOL/L
PROT SERPL-MCNC: 7.2 G/DL
PROTHROMBIN TIME: 11.4 SEC
RBC # BLD AUTO: 2.39 M/UL
RBC # BLD AUTO: 2.96 M/UL
SODIUM SERPL-SCNC: 139 MMOL/L
WBC # BLD AUTO: 5.1 K/UL
WBC # BLD AUTO: 5.26 K/UL

## 2018-07-09 PROCEDURE — 36000705 HC OR TIME LEV I EA ADD 15 MIN: Performed by: OBSTETRICS & GYNECOLOGY

## 2018-07-09 PROCEDURE — 71000033 HC RECOVERY, INTIAL HOUR: Performed by: OBSTETRICS & GYNECOLOGY

## 2018-07-09 PROCEDURE — 63600175 PHARM REV CODE 636 W HCPCS: Performed by: STUDENT IN AN ORGANIZED HEALTH CARE EDUCATION/TRAINING PROGRAM

## 2018-07-09 PROCEDURE — 63600175 PHARM REV CODE 636 W HCPCS: Performed by: ANESTHESIOLOGY

## 2018-07-09 PROCEDURE — 63600175 PHARM REV CODE 636 W HCPCS: Performed by: NURSE ANESTHETIST, CERTIFIED REGISTERED

## 2018-07-09 PROCEDURE — 71000039 HC RECOVERY, EACH ADD'L HOUR: Performed by: OBSTETRICS & GYNECOLOGY

## 2018-07-09 PROCEDURE — 37000009 HC ANESTHESIA EA ADD 15 MINS: Performed by: OBSTETRICS & GYNECOLOGY

## 2018-07-09 PROCEDURE — 25000003 PHARM REV CODE 250: Performed by: NURSE ANESTHETIST, CERTIFIED REGISTERED

## 2018-07-09 PROCEDURE — 86920 COMPATIBILITY TEST SPIN: CPT

## 2018-07-09 PROCEDURE — P9021 RED BLOOD CELLS UNIT: HCPCS

## 2018-07-09 PROCEDURE — 85610 PROTHROMBIN TIME: CPT

## 2018-07-09 PROCEDURE — 36430 TRANSFUSION BLD/BLD COMPNT: CPT

## 2018-07-09 PROCEDURE — 36415 COLL VENOUS BLD VENIPUNCTURE: CPT

## 2018-07-09 PROCEDURE — 99285 EMERGENCY DEPT VISIT HI MDM: CPT

## 2018-07-09 PROCEDURE — 25000003 PHARM REV CODE 250: Performed by: OBSTETRICS & GYNECOLOGY

## 2018-07-09 PROCEDURE — 85025 COMPLETE CBC W/AUTO DIFF WBC: CPT

## 2018-07-09 PROCEDURE — 25000003 PHARM REV CODE 250: Performed by: STUDENT IN AN ORGANIZED HEALTH CARE EDUCATION/TRAINING PROGRAM

## 2018-07-09 PROCEDURE — 86901 BLOOD TYPING SEROLOGIC RH(D): CPT

## 2018-07-09 PROCEDURE — 36000704 HC OR TIME LEV I 1ST 15 MIN: Performed by: OBSTETRICS & GYNECOLOGY

## 2018-07-09 PROCEDURE — 12020 TX SUPFC WND DEHSN SMPL CLSR: CPT | Mod: 78,,, | Performed by: OBSTETRICS & GYNECOLOGY

## 2018-07-09 PROCEDURE — 37000008 HC ANESTHESIA 1ST 15 MINUTES: Performed by: OBSTETRICS & GYNECOLOGY

## 2018-07-09 PROCEDURE — 84702 CHORIONIC GONADOTROPIN TEST: CPT

## 2018-07-09 PROCEDURE — 11000001 HC ACUTE MED/SURG PRIVATE ROOM

## 2018-07-09 PROCEDURE — 25000003 PHARM REV CODE 250: Performed by: ANESTHESIOLOGY

## 2018-07-09 PROCEDURE — 80053 COMPREHEN METABOLIC PANEL: CPT

## 2018-07-09 PROCEDURE — 0W3R7ZZ CONTROL BLEEDING IN GENITOURINARY TRACT, VIA NATURAL OR ARTIFICIAL OPENING: ICD-10-PCS | Performed by: OBSTETRICS & GYNECOLOGY

## 2018-07-09 RX ORDER — FAMOTIDINE 20 MG/1
20 TABLET, FILM COATED ORAL DAILY
Status: DISCONTINUED | OUTPATIENT
Start: 2018-07-10 | End: 2018-07-10 | Stop reason: HOSPADM

## 2018-07-09 RX ORDER — MEPERIDINE HYDROCHLORIDE 50 MG/ML
12.5 INJECTION INTRAMUSCULAR; INTRAVENOUS; SUBCUTANEOUS ONCE AS NEEDED
Status: DISCONTINUED | OUTPATIENT
Start: 2018-07-09 | End: 2018-07-09 | Stop reason: HOSPADM

## 2018-07-09 RX ORDER — ACETAMINOPHEN 325 MG/1
650 TABLET ORAL EVERY 4 HOURS PRN
Status: DISCONTINUED | OUTPATIENT
Start: 2018-07-09 | End: 2018-07-10 | Stop reason: HOSPADM

## 2018-07-09 RX ORDER — SIMETHICONE 80 MG
80 TABLET,CHEWABLE ORAL EVERY 4 HOURS PRN
Status: DISCONTINUED | OUTPATIENT
Start: 2018-07-09 | End: 2018-07-10 | Stop reason: HOSPADM

## 2018-07-09 RX ORDER — DIPHENHYDRAMINE HCL 25 MG
25 CAPSULE ORAL EVERY 4 HOURS PRN
Status: DISCONTINUED | OUTPATIENT
Start: 2018-07-09 | End: 2018-07-10 | Stop reason: HOSPADM

## 2018-07-09 RX ORDER — PROPOFOL 10 MG/ML
VIAL (ML) INTRAVENOUS
Status: DISCONTINUED | OUTPATIENT
Start: 2018-07-09 | End: 2018-07-09

## 2018-07-09 RX ORDER — HYDRALAZINE HYDROCHLORIDE 25 MG/1
100 TABLET, FILM COATED ORAL EVERY 8 HOURS
Status: DISCONTINUED | OUTPATIENT
Start: 2018-07-09 | End: 2018-07-10 | Stop reason: HOSPADM

## 2018-07-09 RX ORDER — ONDANSETRON 8 MG/1
8 TABLET, ORALLY DISINTEGRATING ORAL EVERY 8 HOURS PRN
Status: DISCONTINUED | OUTPATIENT
Start: 2018-07-09 | End: 2018-07-10 | Stop reason: HOSPADM

## 2018-07-09 RX ORDER — HYDROMORPHONE HYDROCHLORIDE 2 MG/ML
0.4 INJECTION, SOLUTION INTRAMUSCULAR; INTRAVENOUS; SUBCUTANEOUS EVERY 5 MIN PRN
Status: DISCONTINUED | OUTPATIENT
Start: 2018-07-09 | End: 2018-07-09 | Stop reason: HOSPADM

## 2018-07-09 RX ORDER — CALCITRIOL 0.25 UG/1
0.25 CAPSULE ORAL
Status: DISCONTINUED | OUTPATIENT
Start: 2018-07-09 | End: 2018-07-10 | Stop reason: HOSPADM

## 2018-07-09 RX ORDER — SODIUM CHLORIDE 9 MG/ML
INJECTION, SOLUTION INTRAVENOUS CONTINUOUS PRN
Status: DISCONTINUED | OUTPATIENT
Start: 2018-07-09 | End: 2018-07-09

## 2018-07-09 RX ORDER — DEXAMETHASONE SODIUM PHOSPHATE 4 MG/ML
INJECTION, SOLUTION INTRA-ARTICULAR; INTRALESIONAL; INTRAMUSCULAR; INTRAVENOUS; SOFT TISSUE
Status: DISCONTINUED | OUTPATIENT
Start: 2018-07-09 | End: 2018-07-09

## 2018-07-09 RX ORDER — LISINOPRIL 20 MG/1
40 TABLET ORAL DAILY
Status: DISCONTINUED | OUTPATIENT
Start: 2018-07-09 | End: 2018-07-10 | Stop reason: HOSPADM

## 2018-07-09 RX ORDER — CITALOPRAM 20 MG/1
20 TABLET, FILM COATED ORAL DAILY
Status: DISCONTINUED | OUTPATIENT
Start: 2018-07-09 | End: 2018-07-10 | Stop reason: HOSPADM

## 2018-07-09 RX ORDER — HYDROCODONE BITARTRATE AND ACETAMINOPHEN 500; 5 MG/1; MG/1
TABLET ORAL
Status: DISCONTINUED | OUTPATIENT
Start: 2018-07-09 | End: 2018-07-10 | Stop reason: HOSPADM

## 2018-07-09 RX ORDER — NIFEDIPINE 30 MG/1
120 TABLET, EXTENDED RELEASE ORAL DAILY
Status: DISCONTINUED | OUTPATIENT
Start: 2018-07-10 | End: 2018-07-10 | Stop reason: HOSPADM

## 2018-07-09 RX ORDER — FENTANYL CITRATE 50 UG/ML
25 INJECTION, SOLUTION INTRAMUSCULAR; INTRAVENOUS EVERY 5 MIN PRN
Status: DISCONTINUED | OUTPATIENT
Start: 2018-07-09 | End: 2018-07-09 | Stop reason: HOSPADM

## 2018-07-09 RX ORDER — ONDANSETRON 2 MG/ML
4 INJECTION INTRAMUSCULAR; INTRAVENOUS DAILY PRN
Status: DISCONTINUED | OUTPATIENT
Start: 2018-07-09 | End: 2018-07-09 | Stop reason: HOSPADM

## 2018-07-09 RX ORDER — GLYCOPYRROLATE 0.2 MG/ML
INJECTION INTRAMUSCULAR; INTRAVENOUS
Status: DISCONTINUED | OUTPATIENT
Start: 2018-07-09 | End: 2018-07-09

## 2018-07-09 RX ORDER — LIDOCAINE HYDROCHLORIDE AND EPINEPHRINE 10; 10 MG/ML; UG/ML
INJECTION, SOLUTION INFILTRATION; PERINEURAL
Status: DISCONTINUED | OUTPATIENT
Start: 2018-07-09 | End: 2018-07-09 | Stop reason: HOSPADM

## 2018-07-09 RX ORDER — OXYCODONE HYDROCHLORIDE 5 MG/1
5 TABLET ORAL EVERY 4 HOURS PRN
Status: DISCONTINUED | OUTPATIENT
Start: 2018-07-09 | End: 2018-07-10 | Stop reason: HOSPADM

## 2018-07-09 RX ORDER — OXYCODONE HYDROCHLORIDE 5 MG/1
5 TABLET ORAL
Status: DISCONTINUED | OUTPATIENT
Start: 2018-07-09 | End: 2018-07-09 | Stop reason: HOSPADM

## 2018-07-09 RX ORDER — SODIUM CHLORIDE, SODIUM LACTATE, POTASSIUM CHLORIDE, CALCIUM CHLORIDE 600; 310; 30; 20 MG/100ML; MG/100ML; MG/100ML; MG/100ML
INJECTION, SOLUTION INTRAVENOUS CONTINUOUS
Status: DISCONTINUED | OUTPATIENT
Start: 2018-07-09 | End: 2018-07-09

## 2018-07-09 RX ORDER — SODIUM CHLORIDE 0.9 % (FLUSH) 0.9 %
3 SYRINGE (ML) INJECTION
Status: DISCONTINUED | OUTPATIENT
Start: 2018-07-09 | End: 2018-07-09

## 2018-07-09 RX ORDER — FENTANYL CITRATE 50 UG/ML
INJECTION, SOLUTION INTRAMUSCULAR; INTRAVENOUS
Status: DISCONTINUED | OUTPATIENT
Start: 2018-07-09 | End: 2018-07-09

## 2018-07-09 RX ORDER — LIDOCAINE HCL/PF 100 MG/5ML
SYRINGE (ML) INTRAVENOUS
Status: DISCONTINUED | OUTPATIENT
Start: 2018-07-09 | End: 2018-07-09

## 2018-07-09 RX ORDER — LABETALOL 100 MG/1
400 TABLET, FILM COATED ORAL EVERY 12 HOURS
Status: DISCONTINUED | OUTPATIENT
Start: 2018-07-09 | End: 2018-07-10 | Stop reason: HOSPADM

## 2018-07-09 RX ORDER — MYCOPHENOLATE MOFETIL 250 MG/1
1000 CAPSULE ORAL 2 TIMES DAILY
Status: DISCONTINUED | OUTPATIENT
Start: 2018-07-09 | End: 2018-07-10 | Stop reason: HOSPADM

## 2018-07-09 RX ORDER — TACROLIMUS 1 MG/1
7 CAPSULE ORAL 2 TIMES DAILY
Status: DISCONTINUED | OUTPATIENT
Start: 2018-07-09 | End: 2018-07-10 | Stop reason: HOSPADM

## 2018-07-09 RX ORDER — ONDANSETRON 2 MG/ML
INJECTION INTRAMUSCULAR; INTRAVENOUS
Status: DISCONTINUED | OUTPATIENT
Start: 2018-07-09 | End: 2018-07-09

## 2018-07-09 RX ORDER — MONTELUKAST SODIUM 10 MG/1
10 TABLET ORAL NIGHTLY
Status: DISCONTINUED | OUTPATIENT
Start: 2018-07-09 | End: 2018-07-10 | Stop reason: HOSPADM

## 2018-07-09 RX ORDER — METOCLOPRAMIDE HYDROCHLORIDE 5 MG/ML
5 INJECTION INTRAMUSCULAR; INTRAVENOUS EVERY 6 HOURS PRN
Status: DISCONTINUED | OUTPATIENT
Start: 2018-07-09 | End: 2018-07-10 | Stop reason: HOSPADM

## 2018-07-09 RX ADMIN — HYDROMORPHONE HYDROCHLORIDE 0.4 MG: 2 INJECTION INTRAMUSCULAR; INTRAVENOUS; SUBCUTANEOUS at 01:07

## 2018-07-09 RX ADMIN — DEXAMETHASONE SODIUM PHOSPHATE 8 MG: 4 INJECTION, SOLUTION INTRAMUSCULAR; INTRAVENOUS at 11:07

## 2018-07-09 RX ADMIN — MYCOPHENOLATE MOFETIL 1000 MG: 250 CAPSULE ORAL at 09:07

## 2018-07-09 RX ADMIN — MONTELUKAST SODIUM 10 MG: 10 TABLET, FILM COATED ORAL at 09:07

## 2018-07-09 RX ADMIN — FERRIC SUBSULFATE 8 ML: 259 SOLUTION TOPICAL at 11:07

## 2018-07-09 RX ADMIN — ONDANSETRON 4 MG: 2 INJECTION INTRAMUSCULAR; INTRAVENOUS at 11:07

## 2018-07-09 RX ADMIN — FENTANYL CITRATE 25 MCG: 50 INJECTION, SOLUTION INTRAMUSCULAR; INTRAVENOUS at 11:07

## 2018-07-09 RX ADMIN — FENTANYL CITRATE 50 MCG: 50 INJECTION, SOLUTION INTRAMUSCULAR; INTRAVENOUS at 10:07

## 2018-07-09 RX ADMIN — LABETALOL HYDROCHLORIDE 400 MG: 100 TABLET, FILM COATED ORAL at 09:07

## 2018-07-09 RX ADMIN — SODIUM CHLORIDE: 0.9 INJECTION, SOLUTION INTRAVENOUS at 10:07

## 2018-07-09 RX ADMIN — TACROLIMUS 7 MG: 1 CAPSULE ORAL at 09:07

## 2018-07-09 RX ADMIN — PROPOFOL 100 MG: 10 INJECTION, EMULSION INTRAVENOUS at 10:07

## 2018-07-09 RX ADMIN — GLYCOPYRROLATE 0.1 MG: 0.2 INJECTION, SOLUTION INTRAMUSCULAR; INTRAVENOUS at 10:07

## 2018-07-09 RX ADMIN — LIDOCAINE HYDROCHLORIDE 100 MG: 20 INJECTION, SOLUTION INTRAVENOUS at 10:07

## 2018-07-09 RX ADMIN — DIPHENHYDRAMINE HYDROCHLORIDE 25 MG: 25 CAPSULE ORAL at 03:07

## 2018-07-09 RX ADMIN — CALCITRIOL 0.25 MCG: 0.25 CAPSULE, LIQUID FILLED ORAL at 05:07

## 2018-07-09 RX ADMIN — HYDRALAZINE HYDROCHLORIDE 100 MG: 25 TABLET, FILM COATED ORAL at 09:07

## 2018-07-09 RX ADMIN — CARBOXYMETHYLCELLULOSE SODIUM 4 DROP: 2.5 SOLUTION/ DROPS OPHTHALMIC at 10:07

## 2018-07-09 RX ADMIN — HYDRALAZINE HYDROCHLORIDE 100 MG: 25 TABLET, FILM COATED ORAL at 03:07

## 2018-07-09 RX ADMIN — LIDOCAINE HYDROCHLORIDE AND EPINEPHRINE 12 ML: 10; 10 INJECTION, SOLUTION INFILTRATION; PERINEURAL at 11:07

## 2018-07-09 NOTE — PROGRESS NOTES
Pt's had multiply elevated blood pressure reading.   Anaesthesia doctor was notified    No new orders given   Continue to monitor   Pt's blood pressure and vaginal bleeding

## 2018-07-09 NOTE — PROGRESS NOTES
Progress Note  Gynecology    Admit Date: 2018  LOS: 0    Reason for Admission:  Postoperative vaginal bleeding following genitourinary procedure    SUBJECTIVE:     Holly Patel is a 27 y.o.  who is POD#0 s/p hemostasis of LEEP site under anesthesia. Ms. Patel is lying comfortably in bed and states that her pain is well controlled on PO tylenol q4 prn and oxycodone IR 5mg q4h prn. She reports no vaginal bleeding post operatively. She is tolerating a regular diet and has not yet had flatus/BM. She has not ambulated and does not produce urine due to ESRD. She denies fatigue, light-headedness, dizziness, fever, chills, CP, and SOB. TEDs in place. IS not in room.      OBJECTIVE:     Vital Signs   Temp:  [96.9 °F (36.1 °C)-98.2 °F (36.8 °C)] 97.9 °F (36.6 °C)  Pulse:  [62-78] 73  Resp:  [16-18] 18  SpO2:  [57 %-100 %] 99 %  BP: (136-174)/() 136/79      Intake/Output Summary (Last 24 hours) at 18 1642  Last data filed at 18 1206   Gross per 24 hour   Intake              250 ml   Output                0 ml   Net              250 ml       Physical Exam:  Gen: A&Ox3, NAD  HEENT: lips dry/cracked  CV: RRR  Pulm: LCTAB, normal respiratory effort  Abd: normoactive bowel sounds, soft, non-distended, non-tender to palpation without rebound or guarding  External GYN: pad in place with quarter-sized spot of light yellow/pink fluid  Ext: PPP, no peripheral edema, TEDs in place    Laboratory:  Recent Results (from the past 24 hour(s))   Prepare RBC 2 Units; preop    Collection Time: 18  6:30 AM   Result Value Ref Range    UNIT NUMBER [id number]     PRODUCT CODE B7820Z30     DISPENSE STATUS CROSSMATCHED     CODING SYSTEM [id number]     Unit Blood Type Code 7300     Unit Blood Type B POS     Unit Expiration [id number]     UNIT NUMBER [id number]     PRODUCT CODE Y4852S71     DISPENSE STATUS CROSSMATCHED     CODING SYSTEM [id number]     Unit Blood Type Code 7300     Unit Blood Type B POS      Unit Expiration [id number]    Type & Screen    Collection Time: 07/09/18  6:30 AM   Result Value Ref Range    Group & Rh B POS     Indirect Frederic NEG    CBC auto differential    Collection Time: 07/09/18  6:33 AM   Result Value Ref Range    WBC 5.10 3.90 - 12.70 K/uL    RBC 2.96 (L) 4.00 - 5.40 M/uL    Hemoglobin 8.4 (L) 12.0 - 16.0 g/dL    Hematocrit 25.9 (L) 37.0 - 48.5 %    MCV 88 82 - 98 fL    MCH 28.4 27.0 - 31.0 pg    MCHC 32.4 32.0 - 36.0 g/dL    RDW 16.6 (H) 11.5 - 14.5 %    Platelets 83 (L) 150 - 350 K/uL    MPV 9.5 9.2 - 12.9 fL    Gran # (ANC) 3.2 1.8 - 7.7 K/uL    Lymph # 0.9 (L) 1.0 - 4.8 K/uL    Mono # 0.3 0.3 - 1.0 K/uL    Eos # 0.7 (H) 0.0 - 0.5 K/uL    Baso # 0.02 0.00 - 0.20 K/uL    Gran% 62.7 38.0 - 73.0 %    Lymph% 16.9 (L) 18.0 - 48.0 %    Mono% 6.1 4.0 - 15.0 %    Eosinophil% 13.7 (H) 0.0 - 8.0 %    Basophil% 0.4 0.0 - 1.9 %    Differential Method Automated    Comprehensive metabolic panel    Collection Time: 07/09/18  6:33 AM   Result Value Ref Range    Sodium 139 136 - 145 mmol/L    Potassium 4.7 3.5 - 5.1 mmol/L    Chloride 106 95 - 110 mmol/L    CO2 23 23 - 29 mmol/L    Glucose 100 70 - 110 mg/dL    BUN, Bld 28 (H) 6 - 20 mg/dL    Creatinine 6.2 (H) 0.5 - 1.4 mg/dL    Calcium 8.6 (L) 8.7 - 10.5 mg/dL    Total Protein 7.2 6.0 - 8.4 g/dL    Albumin 2.7 (L) 3.5 - 5.2 g/dL    Total Bilirubin 0.8 0.1 - 1.0 mg/dL    Alkaline Phosphatase 506 (H) 55 - 135 U/L    AST 46 (H) 10 - 40 U/L    ALT 33 10 - 44 U/L    Anion Gap 10 8 - 16 mmol/L    eGFR if African American 10 (A) >60 mL/min/1.73 m^2    eGFR if non African American 9 (A) >60 mL/min/1.73 m^2   Protime-INR    Collection Time: 07/09/18  6:33 AM   Result Value Ref Range    Prothrombin Time 11.4 9.0 - 12.5 sec    INR 1.0 0.8 - 1.2   hCG, quantitative    Collection Time: 07/09/18  6:33 AM   Result Value Ref Range    hCG Quant <1.2 See Text mIU/mL   CBC auto differential    Collection Time: 07/09/18  3:29 PM   Result Value Ref Range    WBC 5.26  3.90 - 12.70 K/uL    RBC 2.39 (L) 4.00 - 5.40 M/uL    Hemoglobin 6.7 (L) 12.0 - 16.0 g/dL    Hematocrit 21.0 (L) 37.0 - 48.5 %    MCV 88 82 - 98 fL    MCH 28.0 27.0 - 31.0 pg    MCHC 31.9 (L) 32.0 - 36.0 g/dL    RDW 16.8 (H) 11.5 - 14.5 %    Platelets 65 (L) 150 - 350 K/uL    MPV 9.0 (L) 9.2 - 12.9 fL    Gran # (ANC) 4.4 1.8 - 7.7 K/uL    Lymph # 0.4 (L) 1.0 - 4.8 K/uL    Mono # 0.1 (L) 0.3 - 1.0 K/uL    Eos # 0.3 0.0 - 0.5 K/uL    Baso # 0.01 0.00 - 0.20 K/uL    Gran% 83.2 (H) 38.0 - 73.0 %    Lymph% 8.4 (L) 18.0 - 48.0 %    Mono% 2.1 (L) 4.0 - 15.0 %    Eosinophil% 5.9 0.0 - 8.0 %    Basophil% 0.2 0.0 - 1.9 %    Differential Method Automated        ASSESSMENT/PLAN:     Active Hospital Problems    Diagnosis  POA    *Postoperative vaginal bleeding following genitourinary procedure [N99.820]  Unknown    ESRD (end stage renal disease) on dialysis [N18.6, Z99.2]  Not Applicable    Acute blood loss anemia [D62]  Yes    Renovascular hypertension [I15.0]  Yes     Chronic    Prophylactic immunotherapy [Z29.8]  Not Applicable   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 [Z94.4]  Not Applicable     Chronic     hemangioendothelioma s/p LTx ()        Resolved Hospital Problems    Diagnosis Date Resolved POA   No resolved problems to display.       Assessment: 27 y.o.  POD#0 s/p hemostasis of LEEP site under anesthesia, anemic from  vaginal bleeding from previous procedure    Plan:   1. Post-op  - Routine post-op advances  - PO tylenol and oxyIR 5 prn for pain  - Encourage ambulation  - Encourage use of IS and TEDs/SCDs  - Antiemetics prn for nausea/vomiting  - VSS. /79 at 1630, continue to monitor  - Keep strict pad counts until discharge    2. Anemia  - H/H is  at 1630 from  on admission  - 1u PRBCs given on , 1u PRBCs held  - CBC to be collected at 2200 if 2 hours has passed after transfusion    Trena Samano MD  [facility], PGY-1

## 2018-07-09 NOTE — OP NOTE
OPERATIVE NOTE    DATE OF PROCEDURE: 07/09/2018    SURGEON: NICKIE Alvarez M.D.     ASSISTANT: Linn Barr M.D. (RES)     PREOPERATIVE DIAGNOSIS: Postoperative bleeding from LEEP site    POSTOPERATIVE DIAGNOSIS: Postoperative bleeding from LEEP site     PROCEDURE:  1.  Exam under anesthesia  2.  Placement of cervical stay sutures at 3 and 9 o'clock  3.  Cautery of LEEP bed  4.  Run and locking suture of LEEP edges  5.  Application of monsels to LEEP bed  6.  Application of surgicel to LEEP bed    ANESTHESIA: systemic    FINDINGS:  1.  300+ cc of clotted blood in vagina  2.  Active bleeding from LEEP bed at about 9 o'clock  3.  Oozing from LEEP edges  4.  Oozing from extension of LEEP edge at about 10-11 o'clock  5.  Hemostasis at completion of the procedure    ESTIMATED BLOOD LOSS: 400 mL.     URINE OUTPUT: Minimal (dialysis patient)    INDICATIONS: Patient presented with postop vaginal bleeding this morning.  Patient had a LEEP on 6/15/18 and has had intermittent issues with bleeding since then.  She had EUA with oversewing of anterior edge of LEEP bed on 6/19.  Since then she has been admitted for management of HTN urgency on 6/22, 6/27, and 7/6.  During 6/27 admission, she had heavy bleeding which was managed with monsels and vaginal packing, as her BP was to high to safely operate.  Exam this morning demonstrated at least 100-cc of blood in vagina and active bleeding noted at about 9 o'clock of the LEEP site.  Decision was made to proceed with repeat EUA for hemostasis measures.    PROCEDURE IN DETAIL: After proper consents were explained and obtained, the patient was taken to the Operating Room where anesthesia was obtained without difficulty.  She was then placed in dorsal lithotomy position.  The patient was then prepped and draped in normal sterile fashion.  A weighted speculum was placed into the vagina.  Right angle used to visualize the cervix, which was grasped at the anterior lip with the  single tooth tenaculum.     Suture from the prior EUA/revision was noted and removed with suture scissors.  Cervical stay sutures of 2-O vicryl were placed at 3 and 9 o'clock.  The above areas or bleeding were noted.  Hemostasis was achieved with bovie cautery.  The retained needles from the stay sutures were used to suture the LEEP edges in a running and locking closure.  Monsels was then applied to the LEEP bed.  Surgicel was then packed in the LEEP bed, and the stay sutures were tied across to keep it in place.  Following these measures, the cervix was re-inspected and noted to be hemostatic.  Stay sutures were cut and retractors removed from the vagina.    The patient tolerated the procedure well.  All counts were correct x2.  The patient was taken to Recovery area in stable condition.

## 2018-07-09 NOTE — H&P
H&P  Gynecology      SUBJECTIVE:     History of Present Illness:  Holly Patel is a 27 y.o.  with PMHx significant for liver transplant on chronic immunosuppression,poorly controlled HTN, and ESRD on HD who presents with postop vaginal bleeding that worsened this AM. Patient had a LEEP on 6/15/18 and has had intermittent issues with bleeding since then. She had EUA with oversewing of anterior edge of LEEP bed on . Since then she has been admitted for management of HTN urgency on , , and . During  admission, she had heavy bleeding which was managed with monsels and vaginal packing, as her BP was to high to safely operate. Bleeding typically worsens after these hypertensive episodes. During her most recent admission, which was the last 3 days at [facility], she received 4u pRBCs. States bleeding was light then, but became heavy this morning. Has saturated 2 pads and a bath towel over the past 3 hrs. Denies anemia symptoms this AM. Denies vaginal discharge, itching, irritation, fever, chills, nausea, vomiting. Patient denies having any menstrual periods since December, likely due to poor state of health.    Review of patient's allergies indicates:   Allergen Reactions    Chloral hydrate      Other reaction(s): Hallucinations  Other reaction(s): Hives    Hydrocodone Other (See Comments)     Mental status changes       Past Medical History:   Diagnosis Date    Anemia in ESRD (end-stage renal disease) 10/12/2015    dialysis tues, thursday, sat; access left arm    Chronic rejection of liver transplant 3/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    Liver replaced by  transplant 9/10/2012    hemangioendothelioma s/p LTx ()    Moderate protein-calorie malnutrition 2017    MRSA bacteremia 2017    Pneumonia     Prophylactic immunotherapy 2014    Renovascular hypertension 10/2/2015    Secondary hyperparathyroidism 2017    Seizures     Sialadenitis 3/21/2018    Thrombocytopenia 2016     Past Surgical History:   Procedure Laterality Date     SECTION      x 2    CONIZATION OF CERVIX USING LOOP ELECTROSURGICAL EXCISION PROCEDURE (LEEP) N/A 6/15/2018    Procedure: CONIZATION-CERVICAL-LEEP;  Surgeon: Neelam Marroquin MD;  Location: [facility];  Service: OB/GYN;  Laterality: N/A;    EXAMINATION UNDER ANESTHESIA N/A 2018    Procedure: Exam under anesthesia;  Surgeon: Neelam Marroquin MD;  Location: [facility];  Service: OB/GYN;  Laterality: N/A;    LIVER BIOPSY      LIVER TRANSPLANT  1992    PERINEORRHAPHY  2018    Procedure: SUTURE REPAIR,CERVIX;  Surgeon: Neelam Marroquin MD;  Location: [facility];  Service: OB/GYN;;    TUBAL LIGATION       OB History      Para Term  AB Living    2 2 0 2 0 2    SAB TAB Ectopic Multiple Live Births    0 0 0   2        Family History   Problem Relation Age of Onset    Hypertension Mother     Hypertension Father     Melanoma Neg Hx     Breast cancer Neg Hx     Colon cancer Neg Hx     Ovarian cancer Neg Hx      Social History   Substance Use Topics    Smoking status: Never Smoker    Smokeless tobacco: Never Used    Alcohol use No       Current Facility-Administered Medications   Medication    calcitRIOL capsule 0.25 mcg    citalopram tablet 20 mg    [START ON 7/10/2018] famotidine tablet 20 mg    hydrALAZINE tablet 100 mg    labetalol tablet 400 mg    lactated ringers infusion    lisinopril tablet 40 mg    montelukast tablet 10 mg    mycophenolate capsule 1,000 mg    [START ON 7/10/2018] NIFEdipine 24 hr tablet 120 mg    tacrolimus capsule 7 mg     Current  Outpatient Prescriptions   Medication Sig    aspirin 81 MG Chew Take 1 tablet (81 mg total) by mouth once daily.    calcitRIOL (ROCALTROL) 0.25 MCG Cap Take 1 capsule (0.25 mcg total) by mouth every Mon, Wed, Fri.    cinacalcet (SENSIPAR) 30 MG Tab Take 1 tablet (30 mg total) by mouth daily with breakfast. (Patient taking differently: Take 30 mg by mouth. On Tuesday, Thursday, and Saturday with dialysis)    citalopram (CELEXA) 20 MG tablet TAKE 1 TABLET BY MOUTH EVERY DAY    cloNIDine 0.3 mg/24 hr td ptwk (CATAPRES) 0.3 mg/24 hr Place 1 patch onto the skin every 7 days.    famotidine (PEPCID) 40 MG tablet Take 0.5 tablets (20 mg total) by mouth once daily.    hydrALAZINE (APRESOLINE) 100 MG tablet Take 1 tablet (100 mg total) by mouth every 8 (eight) hours.    labetalol (NORMODYNE) 200 MG tablet Take 2 tablets (400 mg total) by mouth every 8 (eight) hours. (Patient taking differently: Take 400 mg by mouth every 12 (twelve) hours. )    lisinopril (PRINIVIL,ZESTRIL) 40 MG tablet Take 1 tablet (40 mg total) by mouth once daily.    mycophenolate (CELLCEPT) 250 mg Cap Take 1,000 mg by mouth 2 (two) times daily.    NIFEdipine (PROCARDIA-XL) 60 MG (OSM) 24 hr tablet Take 2 tablets (120 mg total) by mouth once daily.    ondansetron (ZOFRAN) 4 MG tablet Take 1 tablet (4 mg total) by mouth every 6 (six) hours as needed for Nausea. (Patient taking differently: Take 4 mg by mouth once daily. )    predniSONE (DELTASONE) 1 MG tablet Take 1 mg by mouth every other day.    tacrolimus (PROGRAF) 1 MG Cap Take 7 capsules (7 mg total) by mouth every 12 (twelve) hours.    vitamin renal formula, B-complex-vitamin c-folic acid, (NEPHROCAP) 1 mg Cap Take 1 capsule by mouth once daily.    acetaminophen-codeine 300-30mg (TYLENOL #3) 300-30 mg Tab Take 1 tablet by mouth every 6 (six) hours as needed.    food supplemt, lactose-reduced (ENSURE ACTIVE HIGH PROTEIN) Liqd Take 236 mLs by mouth 2 (two) times daily.    loratadine  (CLARITIN) 10 mg tablet Take 1 tablet (10 mg total) by mouth once daily.    montelukast (SINGULAIR) 10 mg tablet Take 1 tablet (10 mg total) by mouth every evening.    triamcinolone acetonide 0.1% (KENALOG) 0.1 % ointment AAA on arms, legs, and neck bid x 1-2 wks then prn flares only (Patient taking differently: Apply to affected area(s) on arms, legs, and neck twice daily x 1-2 wks then as needed for flares only)       Review of Systems:  Constitutional: no recent weight change, fever, fatigue  Eyes:  No vision changes  Cardiovascular: No chest pain  Respiratory: No shortness of breath or cough  Gastrointestinal: No diarrhea, bloody stool, nausea/vomiting, constipation  Genitourinary: see HPI, does not make urine -on HD  Skin/Breast: No painful breasts, nipple discharge, masses  Neurological: No headache  Endocrine: No hot flushes  Psychiatric: No depression or anxiety     OBJECTIVE:     Vital Signs  Temp:  [97.8 °F (36.6 °C)-97.9 °F (36.6 °C)] 97.9 °F (36.6 °C)  Pulse:  [70-89] 72  Resp:  [16-18] 16  SpO2:  [99 %-100 %] 100 %  BP: (138-158)/(85-97) 150/97    Physical Exam:  Gen: A&Ox3, NAD, thin  CV: RRR  Pulm: no respiratory distress  Abd: Soft, non-distended, non-tender to palpation without rebound or guarding  Ext: no peripheral edema  External genitalia: WNL  Urethra and Meatus: WNL  Vagina: copious bright red blood in vault with approximately 100 ml of clot evacuated  Cervix: sutures present around edges of LEEP bed, oozing noted throughout LEEP bed, active bleeding noted from 9 o'clock area, no blood seen coming from os  Bimanual exam deferred    Laboratory    Recent Labs  Lab 07/07/18  0627 07/08/18  0604 07/09/18  0633   WBC 4.06 5.43 5.10   HGB 6.7* 8.8* 8.4*   HCT 20.5* 26.9* 25.9*   MCV 86 88 88   PLT 66* 79* 83*        Recent Labs  Lab 07/06/18  1222 07/07/18  0627 07/08/18  0604 07/09/18  0633    139 137 139   K 3.6 4.0 4.6 4.7    101 106 106   CO2 28 26 24 23   BUN 26* 36* 16 28*    CREATININE 5.5* 6.9* 4.2* 6.2*    108 97 100   PROT 7.5  --   --  7.2   BILITOT 0.6  --   --  0.8   ALKPHOS 588*  --   --  506*   ALT 32  --   --  33   AST 56*  --   --  46*   MG 2.5 2.3 2.0  --    PHOS 2.9 4.1 3.3  --         Recent Labs  Lab 18  0633   INR 1.0      Diagnostic Results:  None new    ASSESSMENT/PLAN:     Active Hospital Problems    Diagnosis  POA    Postoperative vaginal bleeding following genitourinary procedure [N99.820]  Unknown    ESRD (end stage renal disease) on dialysis [N18.6, Z99.2]  Not Applicable    Acute blood loss anemia [D62]  Yes    Renovascular hypertension [I15.0]  Yes     Chronic    Prophylactic immunotherapy [Z29.8]  Not Applicable   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 [Z94.4]  Not Applicable     Chronic     hemangioendothelioma s/p LTx ()        Resolved Hospital Problems    Diagnosis Date Resolved POA   No resolved problems to display.       Holly Patel is a 27 y.o.  with PMHx significant for liver transplant on chronic immunosuppression, poorly controlled HTN, and ESRD on HD who presents with persistent postop vaginal bleeding after LEEP 3 weeks ago, actively bleeding.    1. Postop bleeding after LEEP  - active bleeding noted on exam as above, needs management on OR  - admit to GYN service  - plan for EUA for further bleeding management, case request placed and OR called. Added on for 10 am or next available.  - consents for EUA and blood reviewed in detail with patient, all questions answered. Consents signed and to chart  - NPO w/ IVFs  - stat UPT    2. ABLA  - H/H , relatively stable from yesterday  - T&S, hold 2u pRBC  - plan to repeat CBC after surgery to determine further transfusion needs as VSS and asymptomatic at this time    3. HTN  - discussed uncontrolled HTN as contributing cause to repetitive re-bleeds, strongly encouraged compliance with antihypertensive regimen  - continue all home antihypertensives while inpatient  - BP  140-150s today, stable for OR    4. H/o liver transplant  - continue home immunosuppression regimen    5. ESRD on HD  - receives HD Tu/Th/Sat  - no acute issues at this time  - can resume usual schedule upon discharge. Anticipate DC this PM vs tomorrow pending transfusion needs. If remains inpatient through Tuesday, will plan to arrange inpatient HD.    D/w GYN staff    Roberta Ruiz MD  OBGYN - PGY 4

## 2018-07-09 NOTE — ED TRIAGE NOTES
Pt reports having LEEP on June 15th. Has been to the ER multiple time since with bleeding that will not stop. Reports going through approximately 2 pads/hr. Reports receiving 2 pints of blood on Friday and 2 pints on Saturday at [facility]. AAO X 3, answers all questions appropriately.

## 2018-07-09 NOTE — ED NOTES
Assumed care of pt. Rounding on the pt performed. Denies any pain at current time. Bed in low, locked position with side rails up x 2. Monitoring continues.

## 2018-07-09 NOTE — ED NOTES
Pt and mother updated on plan of care. Rounding on the pt performed. Denies any concerns at this time. Monitoring continues.

## 2018-07-09 NOTE — ED PROVIDER NOTES
Encounter Date: 7/9/2018    SCRIBE #1 NOTE: Prem HARDY am scribing for, and in the presence of, Dr. Dinero.       History     Chief Complaint   Patient presents with    Vaginal Bleeding     Pt reports vaginal surgery in june, and has continued bleeding since.      Time seen by provider: 6:52 AM    This is a 27 y.o. Female, with history of ESRD, renovascular HTN, liver transplant, secondary hyperparathyroidism, and seizures who presents with complaint of worsening vaginal bleeding s/p LEEP procedure at [facility] in mid June. She notes the bleeding has been persistent since the surgery, but she noticed heavy bleeding this morning. She normally goes through two pads a day, but states she has already used two pads this morning. Patient was admitted to [facility] from 7/6-7/8 for abnormal labs and received four units of blood due to the bleeding. She currently denies fevers, chills, headaches, dizziness, weakness, fatigue, congestion, rhinorrhea, sore throat, cough, SOB, chest pain, abdominal pain, N/V/D, or urinary symptoms. She denies use of alcohol, tobacco, or illicit drugs.         The history is provided by the patient.     Review of patient's allergies indicates:   Allergen Reactions    Chloral hydrate      Other reaction(s): Hallucinations  Other reaction(s): Hives    Hydrocodone Other (See Comments)     Mental status changes     Past Medical History:   Diagnosis Date    Anemia in ESRD (end-stage renal disease) 10/12/2015    dialysis tues, thursday, sat; access left arm    Chronic rejection of liver transplant 3/22/2016    Depression     Encounter for blood transfusion     ESRD on hemodialysis 9/30/2015    History of recent hospitalization 05/2018    pneumonia    History of splenomegaly 4/12/2016    Immunosuppressed 8/5/2017    Iron deficiency anemia secondary to inadequate dietary iron intake 8/16/2017    She receives [facility] iron periodically at the [facility].   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  9/10/2012    hemangioendothelioma s/p LTx ()    Moderate protein-calorie malnutrition 2017    MRSA bacteremia 2017    Pneumonia     Prophylactic immunotherapy 2014    Renovascular hypertension 10/2/2015    Secondary hyperparathyroidism 2017    Seizures     Sialadenitis 3/21/2018    Thrombocytopenia 2016     Past Surgical History:   Procedure Laterality Date     SECTION      x 2    CONIZATION OF CERVIX USING LOOP ELECTROSURGICAL EXCISION PROCEDURE (LEEP) N/A 6/15/2018    Procedure: CONIZATION-CERVICAL-LEEP;  Surgeon: Neelam Marroquin MD;  Location: [facility];  Service: OB/GYN;  Laterality: N/A;    EXAMINATION UNDER ANESTHESIA N/A 2018    Procedure: Exam under anesthesia;  Surgeon: Neelam Marroquin MD;  Location: [facility];  Service: OB/GYN;  Laterality: N/A;    LIVER BIOPSY      LIVER TRANSPLANT  1992    PERINEORRHAPHY  2018    Procedure: SUTURE REPAIR,CERVIX;  Surgeon: Neelam Marroquin MD;  Location: [facility];  Service: OB/GYN;;    TUBAL LIGATION       Family History   Problem Relation Age of Onset    Hypertension Mother     Hypertension Father     Melanoma Neg Hx     Breast cancer Neg Hx     Colon cancer Neg Hx     Ovarian cancer Neg Hx      Social History   Substance Use Topics    Smoking status: Never Smoker    Smokeless tobacco: Never Used    Alcohol use No     Review of Systems   Constitutional: Negative for chills, fatigue and fever.   HENT: Negative for congestion, rhinorrhea and sore throat.    Respiratory: Negative for cough and shortness of breath.    Cardiovascular: Negative for chest pain.   Gastrointestinal: Negative for abdominal pain, diarrhea, nausea and vomiting.   Endocrine: Negative for polyuria.   Genitourinary: Positive for vaginal bleeding. Negative for decreased urine volume and dysuria.   Musculoskeletal: Negative for back pain.   Skin: Negative for rash.   Allergic/Immunologic: Negative for immunocompromised  state.   Neurological: Negative for dizziness, weakness and headaches.   Hematological: Does not bruise/bleed easily.   Psychiatric/Behavioral: Negative for confusion.       Physical Exam     Initial Vitals [07/09/18 0622]   BP Pulse Resp Temp SpO2   (!) 140/87 71 16 97.9 °F (36.6 °C) 100 %      MAP       --         Physical Exam    Nursing note and vitals reviewed.  Constitutional: She appears well-developed and well-nourished. No distress.   HENT:   Head: Normocephalic and atraumatic.   Eyes: Conjunctivae and EOM are normal. Pupils are equal, round, and reactive to light.   Neck: Normal range of motion. Neck supple.   Cardiovascular: Normal rate, regular rhythm and normal heart sounds.   Pulmonary/Chest: Breath sounds normal. No respiratory distress.   Abdominal: Soft. Bowel sounds are normal. She exhibits no distension. There is no tenderness.   Genitourinary:   Genitourinary Comments: Exam deferred to OB-GYN.    Neurological: She is alert and oriented to person, place, and time. She has normal strength.   Skin: Skin is warm and dry. No rash noted.   Psychiatric: She has a normal mood and affect. Her behavior is normal. Judgment and thought content normal.         ED Course   Procedures  Labs Reviewed   CBC W/ AUTO DIFFERENTIAL - Abnormal; Notable for the following:        Result Value    RBC 2.96 (*)     Hemoglobin 8.4 (*)     Hematocrit 25.9 (*)     RDW 16.6 (*)     Platelets 83 (*)     Lymph # 0.9 (*)     Eos # 0.7 (*)     Lymph% 16.9 (*)     Eosinophil% 13.7 (*)     All other components within normal limits   COMPREHENSIVE METABOLIC PANEL - Abnormal; Notable for the following:     BUN, Bld 28 (*)     Creatinine 6.2 (*)     Calcium 8.6 (*)     Albumin 2.7 (*)     Alkaline Phosphatase 506 (*)     AST 46 (*)     eGFR if  10 (*)     eGFR if non  9 (*)     All other components within normal limits   PROTIME-INR          Imaging Results    None          Medical Decision Making:    History:   Old Medical Records: I decided to obtain old medical records.  Old Records Summarized: records from another hospital.       <> Summary of Records: Pt was [place] [facility] [place]7/8 for abnormal labs and was discharged yesterday. During admission, she required four units of blood because she continued to  heavy vaginal bleeding. She was advised to present to [facility] ED for symptomatic anemia. Patient has a history of ESRD and liver transplant at age of 1. She is currently on dialysis. Poor blood pressure control. She underwent a LEEP procedure in mid June and is continuing to experience vaginal bleeding. Prior hospitalization was from 6/27-6/29 for vaginal bleeding. OB-GYN did packing and sutures s/p LEEP procedure.   Clinical Tests:   Lab Tests: Ordered and Reviewed  ED Management:  A 27-year-old female significant past medical history presents with vaginal bleeding.  She has been bleeding since a LEEP procedure in mid June.  Just was discharged from [facility] yesterday in which she required admission and had 4 units of blood.  Denies any symptomatic issues of anemia at this time but is complaining of bleeding.  I due to the fact that patient has already been seen by gynecology in the past and has needed packing and sutures previously I will defer the pelvic exam to them to minimize the number pelvic exam is the patient will need.     6:57 AM Case discussed with Dr. Samano (OB-GYN). Will come to the ED to evaluate the patient.     7:34 AM Dr Ruiz evaluated the patient and spoke with her staff. Will admit the patient to the OR for further management.            Scribe Attestation:   Scribe #1: I performed the above scribed service and the documentation accurately describes the services I performed. I attest to the accuracy of the note.    Attending Attestation:           Physician Attestation for Scribe:  Physician Attestation Statement for Scribe #1: I, Dr. Dinero, reviewed documentation,  as scribed by Prem Walker  in my presence, and it is both accurate and complete.                    Clinical Impression:     1. Vaginal bleeding                                   Denzel Dinero, DO  07/09/18 0722

## 2018-07-09 NOTE — ANESTHESIA POSTPROCEDURE EVALUATION
"Anesthesia Post Evaluation    Patient: Holly Patel    Procedure(s) Performed: Procedure(s) (LRB):  Exam under anesthesia (N/A)    Final Anesthesia Type: general  Patient location during evaluation: PACU  Patient participation: Yes- Able to Participate  Level of consciousness: awake and alert  Post-procedure vital signs: reviewed and stable  Pain management: adequate  Airway patency: patent  PONV status at discharge: No PONV  Anesthetic complications: no      Cardiovascular status: blood pressure returned to baseline  Respiratory status: unassisted  Hydration status: euvolemic  Follow-up not needed.        Visit Vitals  BP (!) 152/104   Pulse 62   Temp 36.8 °C (98.2 °F) (Oral)   Resp 16   Ht 5' 5" (1.651 m)   Wt 52.6 kg (116 lb)   SpO2 100%   Breastfeeding? No   BMI 19.30 kg/m²       Pain/Bladimir Score: Pain Assessment Performed: Yes (7/9/2018 12:30 PM)  Presence of Pain: denies (7/9/2018 12:30 PM)  Pain Rating Prior to Med Admin: 0 (7/8/2018  8:00 AM)  Bladimir Score: 9 (7/9/2018 12:30 PM)      "

## 2018-07-09 NOTE — NURSING
Received to room 387 via stretcher, assisted to regular bed. No acute distress noted. AAOx4, but drowsy. Resp even and unlabored on room air. Skin w/d to touch, bs (+). No skin breakdown noted. Peripad in place, no bleeding noted at this time. Will cont to monitor. Oriented to room and call system. Bed in lowest locked position, side rails elevated, cb in reach.

## 2018-07-09 NOTE — TRANSFER OF CARE
"Anesthesia Transfer of Care Note    Patient: Holly Patel    Procedure(s) Performed: Procedure(s) (LRB):  Exam under anesthesia (N/A)    Patient location: PACU    Anesthesia Type: general    Transport from OR: Transported from OR on 2-3 L/min O2 by NC with adequate spontaneous ventilation    Post pain: adequate analgesia    Post assessment: no apparent anesthetic complications and tolerated procedure well    Post vital signs: stable    Level of consciousness: awake, alert and oriented    Nausea/Vomiting: no nausea/vomiting    Complications: none    Transfer of care protocol was followed      Last vitals:   Visit Vitals  BP (!) 161/104 (BP Location: Right arm, Patient Position: Lying)   Pulse 68   Temp 36.8 °C (98.2 °F) (Oral)   Resp 16   Ht 5' 5" (1.651 m)   Wt 52.6 kg (116 lb)   SpO2 100%   Breastfeeding? No   BMI 19.30 kg/m²     "

## 2018-07-09 NOTE — ANESTHESIA PREPROCEDURE EVALUATION
07/09/2018  Holly Patel is a 27 y.o., female.    Anesthesia Evaluation    I have reviewed the Patient Summary Reports.    I have reviewed the Nursing Notes.   I have reviewed the Medications.     Review of Systems  Anesthesia Hx:  No problems with previous Anesthesia  Denies Family Hx of Anesthesia complications.   Denies Personal Hx of Anesthesia complications.   Social:  Non-Smoker    Hematology/Oncology:     Oncology Normal    -- Anemia: Blood Loss Anemia Anemia of Chronic Kidney Disease Anemia of Chronic Disease  Acute, chronic and acute on chronic   -- Thrombocytopenia:    EENT/Dental:EENT/Dental Normal   Cardiovascular:   Exercise tolerance: good Hypertension, poorly controlled    Pulmonary:  Pulmonary Normal    Renal/:  Renal/ Normal Chronic Renal Disease, ESRD, Dialysis     Hepatic/GI:   Liver Disease, Liver tx age 1   Musculoskeletal:  Musculoskeletal Normal    Neurological:   Seizures, well controlled    Endocrine:  Endocrine Normal    Dermatological:  Skin Normal    Psych:   depression          Physical Exam  General:  Well nourished    Airway/Jaw/Neck:  Airway Findings: Mouth Opening: Normal Tongue: Normal  General Airway Assessment: Adult  Mallampati: I  TM Distance: Normal, at least 6 cm  Jaw/Neck Findings:  Neck ROM: Normal ROM      Dental:  Dental Findings: In tactPoor looking dentition but denies any loose             Anesthesia Plan  Type of Anesthesia, risks & benefits discussed:  Anesthesia Type:  general  Patient's Preference:   Intra-op Monitoring Plan:   Intra-op Monitoring Plan Comments:   Post Op Pain Control Plan:   Post Op Pain Control Plan Comments:   Induction:   IV  Beta Blocker:         Informed Consent: Patient understands risks and agrees with Anesthesia plan.  Questions answered. Anesthesia consent signed with patient.  ASA Score: 3  emergent   Day of Surgery  Review of History & Physical:    H&P update referred to the surgeon.         Ready For Surgery From Anesthesia Perspective.

## 2018-07-09 NOTE — BRIEF OP NOTE
[facility]  Brief Operative Note    SUMMARY     Surgery Date: 7/9/2018     Surgeon(s) and Role:     * NICKIE Alvarez MD - Primary    Assisting Surgeon: Linn Barr    Pre-op Diagnosis:  Postoperative vaginal bleeding following genitourinary procedure [N99.820]    Post-op Diagnosis:  Post-Op Diagnosis Codes:     * Postoperative vaginal bleeding following genitourinary procedure [N99.820]    Procedure(s) (LRB):  Exam under anesthesia (N/A)    Anesthesia: Choice    Description of Procedure:   1.  Exam under anesthesia  2.  Placement of cervical stay sutures at 3 and 9 o'clock  3.  Cautery of LEEP bed  4.  Run and locking suture of LEEP edges  5.  Application of monsels to LEEP bed  6.  Application of surgicel to LEEP bed    Description of the findings of the procedure:   1.  300+ cc of clotted blood in vagina  2.  Active bleeding from LEEP bed at about 9 o'clock  3.  Oozing from LEEP edges  4.  Oozing from extension of LEEP edge at about 10-11 o'clock  5.  Hemostasis at completion of the procedure    Estimated Blood Loss: 400-cc         Specimens:   Specimen (12h ago through future)    None

## 2018-07-10 ENCOUNTER — TELEPHONE (OUTPATIENT)
Dept: TRANSPLANT | Facility: CLINIC | Age: 28
End: 2018-07-10

## 2018-07-10 VITALS
OXYGEN SATURATION: 96 % | DIASTOLIC BLOOD PRESSURE: 96 MMHG | WEIGHT: 115.31 LBS | BODY MASS INDEX: 19.21 KG/M2 | TEMPERATURE: 98 F | RESPIRATION RATE: 20 BRPM | HEIGHT: 65 IN | SYSTOLIC BLOOD PRESSURE: 170 MMHG | HEART RATE: 81 BPM

## 2018-07-10 PROBLEM — N93.9 VAGINAL BLEEDING: Status: RESOLVED | Noted: 2018-07-09 | Resolved: 2018-07-10

## 2018-07-10 LAB
BASOPHILS # BLD AUTO: 0.01 K/UL
BASOPHILS NFR BLD: 0.2 %
BLD PROD TYP BPU: NORMAL
BLD PROD TYP BPU: NORMAL
BLOOD UNIT EXPIRATION DATE: NORMAL
BLOOD UNIT EXPIRATION DATE: NORMAL
BLOOD UNIT TYPE CODE: 7300
BLOOD UNIT TYPE CODE: 7300
BLOOD UNIT TYPE: NORMAL
BLOOD UNIT TYPE: NORMAL
CODING SYSTEM: NORMAL
CODING SYSTEM: NORMAL
DIFFERENTIAL METHOD: ABNORMAL
DISPENSE STATUS: NORMAL
DISPENSE STATUS: NORMAL
EOSINOPHIL # BLD AUTO: 0.4 K/UL
EOSINOPHIL NFR BLD: 6.3 %
ERYTHROCYTE [DISTWIDTH] IN BLOOD BY AUTOMATED COUNT: 16.3 %
HCT VFR BLD AUTO: 22 %
HGB BLD-MCNC: 7.2 G/DL
LYMPHOCYTES # BLD AUTO: 0.9 K/UL
LYMPHOCYTES NFR BLD: 14.3 %
MCH RBC QN AUTO: 28.7 PG
MCHC RBC AUTO-ENTMCNC: 32.7 G/DL
MCV RBC AUTO: 88 FL
MONOCYTES # BLD AUTO: 0.3 K/UL
MONOCYTES NFR BLD: 4.7 %
NEUTROPHILS # BLD AUTO: 4.5 K/UL
NEUTROPHILS NFR BLD: 74.2 %
PLATELET # BLD AUTO: 64 K/UL
PMV BLD AUTO: 9 FL
RBC # BLD AUTO: 2.51 M/UL
TRANS ERYTHROCYTES VOL PATIENT: NORMAL ML
TRANS ERYTHROCYTES VOL PATIENT: NORMAL ML
WBC # BLD AUTO: 6.01 K/UL

## 2018-07-10 PROCEDURE — 36415 COLL VENOUS BLD VENIPUNCTURE: CPT

## 2018-07-10 PROCEDURE — 63600175 PHARM REV CODE 636 W HCPCS: Performed by: NURSE PRACTITIONER

## 2018-07-10 PROCEDURE — 85025 COMPLETE CBC W/AUTO DIFF WBC: CPT

## 2018-07-10 PROCEDURE — 99024 POSTOP FOLLOW-UP VISIT: CPT | Mod: ,,, | Performed by: OBSTETRICS & GYNECOLOGY

## 2018-07-10 PROCEDURE — 63600175 PHARM REV CODE 636 W HCPCS: Performed by: STUDENT IN AN ORGANIZED HEALTH CARE EDUCATION/TRAINING PROGRAM

## 2018-07-10 PROCEDURE — P9021 RED BLOOD CELLS UNIT: HCPCS

## 2018-07-10 PROCEDURE — 25000003 PHARM REV CODE 250: Performed by: STUDENT IN AN ORGANIZED HEALTH CARE EDUCATION/TRAINING PROGRAM

## 2018-07-10 PROCEDURE — 80100016 HC MAINTENANCE HEMODIALYSIS

## 2018-07-10 RX ORDER — HYDROCODONE BITARTRATE AND ACETAMINOPHEN 500; 5 MG/1; MG/1
TABLET ORAL
Status: DISCONTINUED | OUTPATIENT
Start: 2018-07-10 | End: 2018-07-10 | Stop reason: HOSPADM

## 2018-07-10 RX ORDER — SODIUM CHLORIDE 9 MG/ML
INJECTION, SOLUTION INTRAVENOUS ONCE
Status: DISCONTINUED | OUTPATIENT
Start: 2018-07-10 | End: 2018-07-10 | Stop reason: HOSPADM

## 2018-07-10 RX ORDER — CINACALCET 30 MG/1
30 TABLET, FILM COATED ORAL
Status: DISCONTINUED | OUTPATIENT
Start: 2018-07-11 | End: 2018-07-10 | Stop reason: HOSPADM

## 2018-07-10 RX ADMIN — ERYTHROPOIETIN 20000 UNITS: 20000 INJECTION, SOLUTION INTRAVENOUS; SUBCUTANEOUS at 02:07

## 2018-07-10 RX ADMIN — FAMOTIDINE 20 MG: 20 TABLET ORAL at 09:07

## 2018-07-10 RX ADMIN — TACROLIMUS 7 MG: 1 CAPSULE ORAL at 09:07

## 2018-07-10 RX ADMIN — MYCOPHENOLATE MOFETIL 1000 MG: 250 CAPSULE ORAL at 09:07

## 2018-07-10 RX ADMIN — CITALOPRAM HYDROBROMIDE 20 MG: 20 TABLET ORAL at 09:07

## 2018-07-10 RX ADMIN — HYDRALAZINE HYDROCHLORIDE 100 MG: 25 TABLET, FILM COATED ORAL at 05:07

## 2018-07-10 RX ADMIN — TACROLIMUS 7 MG: 1 CAPSULE ORAL at 05:07

## 2018-07-10 NOTE — PROGRESS NOTES
Notified GYN resident of pt's elevated /96. New order to give scheduled Hydralazine 100mg and ok to discharge pt.

## 2018-07-10 NOTE — NURSING
One unit PRBC ordered. Notified Dr. Daily of nursing concerns of /93 and of being on HD.Also of mother of patient inquiring about HD today?,order received to hold blood for now until further notice.

## 2018-07-10 NOTE — TELEPHONE ENCOUNTER
----- Message from Elinor Medeiros MD sent at 7/9/2018  3:02 PM CDT -----  Dejan STEWARD    Just wanted to touch base with you about Ms. Patel. We saw her yesterday as she was admitted with vaginal bleeding and team wanted help with management of immunosuppression.    In reading your notes it seems that she is only on Tacro but she kept saying she was on Cellcept. The team was actually giving her both. I was going to have the Hep fellow follow this up with you this morning but she was discharged.    Not sure if she is due for a visit or labs but I was thinking maybe one of the coordinator could call her and clarify things...    What do you think?    Elinor Medeiros M.D.  Gastroenterology Fellow, PGY-V  [facility]

## 2018-07-10 NOTE — PROGRESS NOTES
Progress Note  Gynecology    Admit Date: 2018  LOS: 1    Reason for Admission:  Postoperative vaginal bleeding following genitourinary procedure    SUBJECTIVE:     Holly Patel is a 27 y.o.  who is POD#1 s/p hemostasis of LEEP site under anesthesia with PMHx significant for liver transplant on chronic immunosuppression, poorly controlled HTN, and ESRD on HD . Ms. Patel is lying comfortably in bed and states that her pain is well controlled. She did not require pain meds overnight. She reports no vaginal bleeding post operatively. She is tolerating a regular diet and has not yet had flatus/BM. She has not ambulated and does not produce urine due to ESRD. She denies fatigue, light-headedness, dizziness, fever, chills, CP, and SOB.       OBJECTIVE:     Vital Signs   Temp:  [96.9 °F (36.1 °C)-98.2 °F (36.8 °C)] 98 °F (36.7 °C)  Pulse:  [62-91] 85  Resp:  [16-18] 18  SpO2:  [57 %-100 %] 95 %  BP: (136-178)/() 178/111      Intake/Output Summary (Last 24 hours) at 07/10/18 0729  Last data filed at 07/10/18 0600   Gross per 24 hour   Intake           836.67 ml   Output                0 ml   Net           836.67 ml        Physical Exam:  Gen: A&Ox3, NAD  CV: RRR  Pulm: LCTAB, normal respiratory effort  Abd: normoactive bowel sounds, soft, non-distended, non-tender to palpation without rebound or guarding  External GYN: did not change pad since pm rounds yesterday, same quarter-sized spot of light yellow/pink discharge present on pad  Ext: PPP, no peripheral edema    Laboratory:    Lab 18  0633 18  1529 07/10/18  0138   WBC 5.10 5.26 6.01   HGB 8.4* 6.7* 7.2*   HCT 25.9* 21.0* 22.0*   MCV 88 88 88   PLT 83* 65* 64*       ASSESSMENT/PLAN:     Active Hospital Problems    Diagnosis  POA    *Postoperative vaginal bleeding following genitourinary procedure [N99.820]  Yes    Vaginal bleeding [N93.9]  Yes    ESRD (end stage renal disease) on dialysis [N18.6, Z99.2]  Not Applicable    Acute  blood loss anemia [D62]  Yes    Renovascular hypertension [I15.0]  Yes     Chronic    Prophylactic immunotherapy [Z29.8]  Not Applicable   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 [Z94.4]  Not Applicable     Chronic     hemangioendothelioma s/p LTx ()        Resolved Hospital Problems    Diagnosis Date Resolved POA   No resolved problems to display.       Assessment: 27 y.o.  POD#1 s/p hemostasis of LEEP site under anesthesia with PMHx significant for liver transplant on chronic immunosuppression, poorly controlled HTN, and ESRD on HD.  Admitted with persistent postop vaginal bleeding after LEEP 3 weeks ago. Also with ABLA, now stable after EUA.    Plan:   1. Post-op  - Routine post-op advances  - PO tylenol and oxyIR 5 prn for pain  - Encourage ambulation  - Encourage use of IS and TEDs/SCDs  - Antiemetics prn for nausea/vomiting  - Keep strict pad count until discharge    2. ABLA  - H/H is  today from  on admission  - 1u PRBCs given on , 1u PRBCs held  - 1 additional unit of PRBC to be given at dialysis today  - VSS, asymptomatic    3. HTN  - Blood pressure of 178/111 at 0430 today, hydralazine 100mg given at 0515  - continue all home antihypertensives while inpatient     4. H/o liver transplant  - continue home immunosuppression regimen     5. ESRD on HD  - receives HD //Sat  - Nephrology consulted. Patient to receive dialysis today before discharge    Dispo: Plan to discharge home today after dialysis and blood transfusion.    Trena Samano MD  OBGYN, PGY-1    Plan discussed with upper level resident.

## 2018-07-10 NOTE — PLAN OF CARE
Problem: Patient Care Overview  Goal: Plan of Care Review  Outcome: Outcome(s) achieved Date Met: 07/10/18   Free from falls, injury, or skin breakdown this hospital admission. Pt eager & in agreement w/ DC. VU of DC instructions and the need to attend follow-up appointment--paperwork passed & explained.Pt denies pain throughout shift. 3L fluids removed during dialysis.IV removed w/ cath tip intact, WNL. Voiding, ambulating, & tolerating PO well. To be DCd home w/ family--will be escorted downstairs via  transport team once dressed, ready & ride arrives.Pt discharged in no distress with mother and kids.

## 2018-07-10 NOTE — CONSULTS
Consult Note  Nephrology    Consult Requested By: Neelam Marroquin MD  Reason for Consult: ESRD/Anemia    SUBJECTIVE:     History of Present Illness:  Patient is a 27 y.o. female presents with another episode of vaginal bleeding and underwent LEEP repair.  Known to us from recent hospitalization.  Extensive hx as outlined below including ESRD on HD TTS at [facility].  Consulted this am for routine HD needs prior to DC.      Pt seen and examined.  Feels ok and eager to go home.  No further vaginal bleeding noted.  Family members at bedside and updated on treatment plan.      No CP/SOB/N/V/D/F/C.      EPIC reviewed.     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 ()    Moderate protein-calorie malnutrition 2017    MRSA bacteremia 2017    Pneumonia     Prophylactic immunotherapy 2014    Renovascular hypertension 10/2/2015    Secondary hyperparathyroidism 2017    Seizures     Sialadenitis 3/21/2018    Thrombocytopenia 2016     Past Surgical History:   Procedure Laterality Date     SECTION      x 2    CONIZATION OF CERVIX USING LOOP ELECTROSURGICAL EXCISION PROCEDURE (LEEP) N/A 6/15/2018    Procedure: CONIZATION-CERVICAL-LEEP;  Surgeon: Neelam Marroquin MD;  Location: [facility];  Service: OB/GYN;  Laterality: N/A;    EXAMINATION UNDER ANESTHESIA N/A 2018    Procedure: Exam under anesthesia;   Surgeon: Neelam Marroquin MD;  Location: [facility];  Service: OB/GYN;  Laterality: N/A;    EXAMINATION UNDER ANESTHESIA N/A 7/9/2018    Procedure: Exam under anesthesia (ADD ON );  Surgeon: NICKIE Alvarez MD;  Location: [facility];  Service: OB/GYN;  Laterality: N/A;  (ADD ON )    LIVER BIOPSY      LIVER TRANSPLANT  09/1992    PERINEORRHAPHY  6/19/2018    Procedure: SUTURE REPAIR,CERVIX;  Surgeon: Neelam Marroquin MD;  Location: [facility];  Service: OB/GYN;;    TUBAL LIGATION  2010     Family History   Problem Relation Age of Onset    Hypertension Mother     Hypertension Father     Melanoma Neg Hx     Breast cancer Neg Hx     Colon cancer Neg Hx     Ovarian cancer Neg Hx      Social History   Substance Use Topics    Smoking status: Never Smoker    Smokeless tobacco: Never Used    Alcohol use No       Review of patient's allergies indicates:   Allergen Reactions    Chloral hydrate      Other reaction(s): Hallucinations  Other reaction(s): Hives    Hydrocodone Other (See Comments)     Mental status changes        Review of Systems:  Constitutional: No fever or chills  Respiratory: No cough or shortness of breath  Cardiovascular: No chest pain or palpitations  Gastrointestinal: No nausea or vomiting  Neurological: No confusion or weakness    OBJECTIVE:     Vital Signs (Most Recent)  Temp: 97.7 °F (36.5 °C) (07/10/18 0739)  Pulse: 87 (07/10/18 0739)  Resp: 18 (07/10/18 0739)  BP: (!) 182/93 (07/10/18 0739)  SpO2: 100 % (07/10/18 0739)    Vital Signs Range (Last 24H):  Temp:  [96.9 °F (36.1 °C)-98.2 °F (36.8 °C)]   Pulse:  [62-91]   Resp:  [16-18]   BP: (136-182)/()   SpO2:  [95 %-100 %]       Intake/Output Summary (Last 24 hours) at 07/10/18 1053  Last data filed at 07/10/18 0600   Gross per 24 hour   Intake           836.67 ml   Output                0 ml   Net           836.67 ml       Physical Exam:  General appearance: Well developed, well nourished  Eyes:  Conjunctivae/corneas clear.  PERRL.  Lungs: Normal respiratory effort,   clear to auscultation bilaterally   Heart: Regular rate and rhythm, S1, S2 normal, no murmur, rub or herbert.  Abdomen: Soft, non-tender non-distended; bowel sounds normal; no masses,  no organomegaly  Extremities: No cyanosis or clubbing. No edema.    Skin: Skin color, texture, turgor normal. No rashes or lesions  Neurologic: Normal strength and tone. No focal numbness or weakness   Left arm AVF      Laboratory:    Recent Labs  Lab 07/10/18  0138   WBC 6.01   RBC 2.51*   HGB 7.2*   HCT 22.0*   PLT 64*   MCV 88   MCH 28.7   MCHC 32.7     BMP:   Recent Labs  Lab 07/08/18  0604 07/09/18  0633   GLU 97 100    139   K 4.6 4.7    106   CO2 24 23   BUN 16 28*   CREATININE 4.2* 6.2*   CALCIUM 8.4* 8.6*   MG 2.0  --      Lab Results   Component Value Date    CALCIUM 8.6 (L) 07/09/2018    PHOS 3.3 07/08/2018     BNP  No results for input(s): BNP, BNPTRIAGEBLO in the last 168 hours.  Lab Results   Component Value Date    URICACID 4.7 05/16/2018     Lab Results   Component Value Date    IRON 54 05/16/2018    TIBC 292 05/16/2018    FERRITIN 1,062 (H) 05/16/2018     Lab Results   Component Value Date    PTH 1,771.8 (H) 06/28/2018    CALCIUM 8.6 (L) 07/09/2018    CAION 0.97 (L) 01/26/2018    PHOS 3.3 07/08/2018       Diagnostic Results:  No orders to display       ASSESSMENT/PLAN:     1. End-stage renal disease on hemodialysis Tuesday Thursday and Saturdays secondary to hypertensive nephrosclerosis (N 18.6, Z 99.2, I 12.0): Extensive renal history and formerly on home dialysis 4 times a week but changed to outpatient unit 3 times a week with [facility] nephrology Dr. Bass.  Consent signed for routine dialysis today.  Renally dose meds, avoid nephrotoxins, and monitor I/O's closely.  2. Hypertension secondary to noncompliant medication regimen plus suboptimal regimen (I 12.0, Z 91.14):  See MAR.  Started ACE inhibitor last admit.   May consider changing labetalol to  metoprolol or Coreg.  Ultrafiltration on hemodialysis.  Defer to Dr. Bass about further med adjustments.    3. Anemia of chronic kidney disease and acute blood loss anemia from vaginal bleeding (D 63.1, N93.9):  Epogen/PRBc's on HD today.  4. Status post liver transplant (Z 94.4): Continue antirejection regimen.  Defer.  5. HPTH (N25.81):  Continue sensipar as now.        Thanks for consult  See above  Ok to DC after HD today.

## 2018-07-10 NOTE — DISCHARGE SUMMARY
Discharge Summary  Gynecology      Admit Date: 2018    Discharge Date: 7/10/2018     Attending Physician: Neelam Marroquin MD    Principal Diagnoses: Postoperative vaginal bleeding following genitourinary procedure    Active Hospital Problems    Diagnosis  POA    *Postoperative vaginal bleeding following genitourinary procedure [N99.820]  Yes    ESRD (end stage renal disease) on dialysis [N18.6, Z99.2]  Not Applicable    Acute blood loss anemia [D62]  Yes    Renovascular hypertension [I15.0]  Yes     Chronic    Prophylactic immunotherapy [Z29.8]  Not Applicable   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 [Z94.4]  Not Applicable     Chronic     hemangioendothelioma s/p LTx ()        Resolved Hospital Problems    Diagnosis Date Resolved POA    Vaginal bleeding [N93.9] 07/10/2018 Yes       Procedures: Procedure(s) (LRB):  Exam under anesthesia (ADD ON ) (N/A)    Discharged Condition: good    Hospital Course:   Holly Patel is a 27 y.o.  with PMHx significant for liver transplant on chronic immunosuppression, poorly controlled HTN, and ESRD on HD who presented with acute postop vaginal bleeding 3 weeks after LEEP and EUA with sutures placed for bleeding control, both mid-. On initial exam, patient having active bleeding from the cervix, see H&P for full details. She was taken to OR for EUA where sutures, cautery, and hemostatic agents were used to obtain hemostasis. See op note. She was observed overnight for pad counts and transfusion due to ABLA. Bleeding remained minimal to none after procedure. On HD#2, she received HD to stay on her usual schedule and an additional unit of blood during dialysis. She received a total of 2u pRBC during this admission.     She was then stable for discharge home with precautions and instruction to follow up with GYN in 4 weeks. Importance of compliance with antihypertensives and close follow up with PCP also stressed.     Consults: Nephrology    Significant  Diagnostic Studies:    Recent Labs  Lab 07/09/18  0633 07/09/18  1529 07/10/18  0138   WBC 5.10 5.26 6.01   HGB 8.4* 6.7* 7.2*   HCT 25.9* 21.0* 22.0*   MCV 88 88 88   PLT 83* 65* 64*        Disposition: Home or Self Care    Patient Instructions:   Current Discharge Medication List      CONTINUE these medications which have NOT CHANGED    Details   aspirin 81 MG Chew Take 1 tablet (81 mg total) by mouth once daily.  Refills: 0      calcitRIOL (ROCALTROL) 0.25 MCG Cap Take 1 capsule (0.25 mcg total) by mouth every Mon, Wed, Fri.  Qty: 12 capsule, Refills: 0      cinacalcet (SENSIPAR) 30 MG Tab Take 1 tablet (30 mg total) by mouth daily with breakfast.  Qty: 30 tablet, Refills: 11      citalopram (CELEXA) 20 MG tablet TAKE 1 TABLET BY MOUTH EVERY DAY  Qty: 30 tablet, Refills: 1      cloNIDine 0.3 mg/24 hr td ptwk (CATAPRES) 0.3 mg/24 hr Place 1 patch onto the skin every 7 days.  Qty: 4 patch, Refills: 11    Associated Diagnoses: Renovascular hypertension      famotidine (PEPCID) 40 MG tablet Take 0.5 tablets (20 mg total) by mouth once daily.  Qty: 15 tablet, Refills: 11    Associated Diagnoses: Hematemesis with nausea      hydrALAZINE (APRESOLINE) 100 MG tablet Take 1 tablet (100 mg total) by mouth every 8 (eight) hours.  Qty: 90 tablet, Refills: 0      labetalol (NORMODYNE) 200 MG tablet Take 2 tablets (400 mg total) by mouth every 8 (eight) hours.  Qty: 360 tablet, Refills: 11    Associated Diagnoses: Hypertensive emergency; Uncontrolled hypertension; Renovascular hypertension      lisinopril (PRINIVIL,ZESTRIL) 40 MG tablet Take 1 tablet (40 mg total) by mouth once daily.  Qty: 30 tablet, Refills: 0      mycophenolate (CELLCEPT) 250 mg Cap Take 1,000 mg by mouth 2 (two) times daily.      NIFEdipine (PROCARDIA-XL) 60 MG (OSM) 24 hr tablet Take 2 tablets (120 mg total) by mouth once daily.  Qty: 60 tablet, Refills: 11      ondansetron (ZOFRAN) 4 MG tablet Take 1 tablet (4 mg total) by mouth every 6 (six) hours as  needed for Nausea.  Qty: 15 tablet, Refills: 0      predniSONE (DELTASONE) 1 MG tablet Take 1 mg by mouth every other day.      tacrolimus (PROGRAF) 1 MG Cap Take 7 capsules (7 mg total) by mouth every 12 (twelve) hours.  Qty: 420 capsule, Refills: 11    Associated Diagnoses: Liver transplanted      vitamin renal formula, B-complex-vitamin c-folic acid, (NEPHROCAP) 1 mg Cap Take 1 capsule by mouth once daily.  Qty: 30 capsule, Refills: 0      acetaminophen-codeine 300-30mg (TYLENOL #3) 300-30 mg Tab Take 1 tablet by mouth every 6 (six) hours as needed.  Qty: 10 tablet, Refills: 0      food supplemt, lactose-reduced (ENSURE ACTIVE HIGH PROTEIN) Liqd Take 236 mLs by mouth 2 (two) times daily.  Qty: 60 Can, Refills: 6    Associated Diagnoses: Liver replaced by transplant; ESRD on hemodialysis; Iron deficiency anemia secondary to inadequate dietary iron intake; Moderate protein-calorie malnutrition      loratadine (CLARITIN) 10 mg tablet Take 1 tablet (10 mg total) by mouth once daily.  Qty: 30 tablet, Refills: 0      triamcinolone acetonide 0.1% (KENALOG) 0.1 % ointment AAA on arms, legs, and neck bid x 1-2 wks then prn flares only  Qty: 80 g, Refills: 3    Associated Diagnoses: Atopic dermatitis         STOP taking these medications       montelukast (SINGULAIR) 10 mg tablet Comments:   Reason for Stopping:                 Discharge Procedure Orders  Other restrictions (specify):   Order Comments: Pelvic rest until follow up visit. Nothing in vagina -no sex, tampons, douching, etc.     Notify your health care provider if you experience any of the following:  temperature >100.4     Notify your health care provider if you experience any of the following:  persistent nausea and vomiting or diarrhea     Notify your health care provider if you experience any of the following:  severe uncontrolled pain     Notify your health care provider if you experience any of the following:   Order Comments: Heavy vaginal bleeding  saturating more than 1 pad per hr for at least consecutive 2 hrs.     Activity as tolerated         Follow-up Information     Neelam Marroquin MD. Schedule an appointment as soon as possible for a visit in 4 weeks.    Specialties:  Obstetrics, Obstetrics and Gynecology  Why:  follow up visit  Contact information:  [address] [address]  [phone]                   Roberta Ruiz MD  OBGYN - PGY 4

## 2018-07-10 NOTE — TELEPHONE ENCOUNTER
Patient currently admitted again. Once discharged, will determine medications per discharge report and lab review with Dr. Pinedo.

## 2018-07-10 NOTE — PLAN OF CARE
Initial Discharge Planning Assesment:  Patient admitted on 7/9/2018    Chart reviewed, Care plan discussed. Discussed care plan with treatment team,  attending Dr Marroquin  Consults following are: Nephrology    Pt was recently admitted to [facility] on Sunday 7/8/2018 for HTN crisis.  Readmit questionnaire completed.     PCP updated in [facility]: Dr. Sosa  Pharmacy, updated in Epic: [facility] in [place]    Pt states she lives independently at home and plans to return home upon discharge.  Pt receives HD TTS at 7 am at [facility].     DME at home: None    Current dispo Home  Transportation: Mom to drive home    Case management  to follow    No anticipated DC needs from CM perspective.       07/10/18 1038   Discharge Assessment   Assessment Type Discharge Planning Assessment   Confirmed/corrected address and phone number on facesheet? Yes   Assessment information obtained from? Patient   Communicated expected length of stay with patient/caregiver yes   Prior to hospitilization cognitive status: Alert/Oriented   Prior to hospitalization functional status: Independent   Current cognitive status: Alert/Oriented   Current Functional Status: Independent   Lives With parent(s)   Able to Return to Prior Arrangements yes   Is patient able to care for self after discharge? Yes   Who are your caregiver(s) and their phone number(s)? Mother:  Myrna [phone]   Patient's perception of discharge disposition home or selfcare   Readmission Within The Last 30 Days previous discharge plan unsuccessful   If yes, most recent facility name: [facility] on 7/8/2018   Patient currently being followed by outpatient case management? No   Patient currently receives home health services? No   Patient currently receives any other outside agency services? No   Is it the patient/care giver preference to resume care with the current outside agency? No   Equipment Currently Used at Home none   Do you have any problems affording any of your  prescribed medications? No   Is the patient taking medications as prescribed? yes   Does the patient have transportation home? Yes   Transportation Available family or friend will provide   Dialysis Name and Scheduled days [facility] 7 am   Does the patient receive services at the [facility]? No   Discharge Plan A Home with family   Discharge Plan B Home   Does the patient currently use HME? No   Does the patient receive outpatient dialysis? Yes   Are there any open cases? No   Readmission Questionnaire   At the time of your discharge, did someone talk to you about what your health problems were? Yes   At the time of discharge, did someone talk to you about what to watch out for regarding worsening of your health problem? Yes   At the time of discharge, did someone talk to you about what to do if you experienced worsening of your health problem? Yes   At the time of discharge, did someone talk to you about which medication to take when you left the hospital and which ones to stop taking? Yes   At the time of discharge, did someone talk to you about when and where to follow up with a doctor after you left the hospital? Yes   What do you believe caused you to be sick enough to be re-admitted? Vaginal Bleeding   How often do you need to have someone help you when you read instructions, pamphlets, or other written material from your doctor or pharmacy? Never   Do you have problems taking your medications as prescribed? No   Do you have any problems affording any of  your prescribed medications? No   Do you have problems obtaining/receiving your medications? No   Does the patient have transportation to healthcare appointments? Yes   Living Arrangements house   Does the patient have family/friends to help with healtcare needs after discharge? yes   Does your caregiver provide all the help you need? Yes   Are you currently feeling confused? No   Are you currently having problems thinking? No   Are you  currently having memory problems? No   Have you felt down, depressed, or hopeless? 1   Have you felt little interest or pleasure in doing things? 0   In the last 7 days, my sleep quality was: fair

## 2018-07-10 NOTE — PLAN OF CARE
Mom to drive pt home.    No DC needs from CM perspective.       07/10/18 1044   Final Note   Assessment Type Final Discharge Note   Discharge Disposition Home   What phone number can be called within the next 1-3 days to see how you are doing after discharge? [phone]   Hospital Follow Up  Appt(s) scheduled? Yes   Discharge plans and expectations educations in teach back method with documentation complete? Yes   Referral to / orders for Home Health Complete? n/a   Did you assess the readiness or willingness of the family or caregiver to support self management of care? Yes   Right Care Referral Info   Post Acute Recommendation No Care

## 2018-07-11 ENCOUNTER — OFFICE VISIT (OUTPATIENT)
Dept: INTERNAL MEDICINE | Facility: CLINIC | Age: 28
End: 2018-07-11
Payer: MEDICARE

## 2018-07-11 VITALS
DIASTOLIC BLOOD PRESSURE: 62 MMHG | HEIGHT: 65 IN | BODY MASS INDEX: 18.33 KG/M2 | OXYGEN SATURATION: 99 % | TEMPERATURE: 98 F | HEART RATE: 76 BPM | WEIGHT: 110 LBS | SYSTOLIC BLOOD PRESSURE: 128 MMHG

## 2018-07-11 DIAGNOSIS — Z09 HOSPITAL DISCHARGE FOLLOW-UP: Primary | ICD-10-CM

## 2018-07-11 PROCEDURE — 99212 OFFICE O/P EST SF 10 MIN: CPT | Mod: S$PBB,,, | Performed by: NURSE PRACTITIONER

## 2018-07-11 PROCEDURE — 99215 OFFICE O/P EST HI 40 MIN: CPT | Mod: PBBFAC | Performed by: NURSE PRACTITIONER

## 2018-07-11 PROCEDURE — 99999 PR PBB SHADOW E&M-EST. PATIENT-LVL V: CPT | Mod: PBBFAC,,, | Performed by: NURSE PRACTITIONER

## 2018-07-11 NOTE — PROGRESS NOTES
Subjective:       Patient ID: Holly Patel is a 27 y.o. female.    Chief Complaint: Hospital Follow Up    HPI:  28 yo female with complex medical history that presents to clinic today for hospital follow up.    Medical history significant for ESRD on dialysis, chronic anemia, immunosuppression, liver transplant and HTN.    Patient was admitted inpatient on 07/6/18 for anemia and weakness.  Was found to have vaginal bleeding and continued to have vaginal bleeding after GYN procedure patient received multiple blood transfusions while inpatient.  States that she was discharged yesterday from hospital and that the vaginal bleeding has stopped.    States that she is feeling better.  Denies any SOB, chest pain, n/v or dizziness.  States that her appetite is back to normal and her energy level is getting back to normal.      Review of Systems   Constitutional: Negative for activity change, appetite change, fatigue and fever.   Respiratory: Negative for apnea, cough, shortness of breath and wheezing.    Cardiovascular: Negative for chest pain, palpitations and leg swelling.   Gastrointestinal: Negative for abdominal distention, abdominal pain, constipation, diarrhea, nausea and vomiting.   Musculoskeletal: Negative for arthralgias, joint swelling, myalgias, neck pain and neck stiffness.   Skin: Negative for color change and rash.   Neurological: Negative for dizziness, light-headedness, numbness and headaches.   Psychiatric/Behavioral: Negative for behavioral problems.       Objective:      Physical Exam   Constitutional: She is oriented to person, place, and time. She appears well-developed and well-nourished. No distress.   Eyes: Conjunctivae and EOM are normal. Pupils are equal, round, and reactive to light.   Neck: Normal range of motion. Neck supple. No thyromegaly present.   Cardiovascular: Normal rate, regular rhythm, normal heart sounds and intact distal pulses.    No murmur heard.  Pulmonary/Chest: Effort  normal and breath sounds normal. No respiratory distress. She has no wheezes. She has no rales.   Abdominal: Soft. Bowel sounds are normal. She exhibits no distension and no mass. There is no tenderness.   Lymphadenopathy:     She has no cervical adenopathy.   Neurological: She is alert and oriented to person, place, and time. No sensory deficit.   Skin: Skin is warm and dry. No erythema.   Psychiatric: Her behavior is normal.       Assessment:       1. Hospital discharge follow-up        Plan:       1. Hospital discharge follow-up    -Vitals are stable in clinic.  -Patient appears to be doing well since hospital discharge.  Reports no vaginal bleeding since discharge.  -Encouraged to keep follow up appointments with GYN, nephrology, hematology and PCP.

## 2018-07-12 ENCOUNTER — TELEPHONE (OUTPATIENT)
Dept: TRANSPLANT | Facility: CLINIC | Age: 28
End: 2018-07-12

## 2018-07-12 NOTE — TELEPHONE ENCOUNTER
Called pt to review transplant medications. She reports taking prescribed doses of both MMF and tac.  She agreed to repeat full labs Monday, 7/16 to determine doses needed moving forward.      While on phone, pt asked about being on the kidney transplant list. Reminded her that she has been declined 3 times because of continued non compliance with labs, medications and appointments. Pt states she feels like she is doing everything we are asking of her.  Discussed that I will ask Dr. Pinedo about this referral. Pt agreed to discuss this next week after lab results are in.

## 2018-07-13 ENCOUNTER — TELEPHONE (OUTPATIENT)
Dept: TRANSPLANT | Facility: CLINIC | Age: 28
End: 2018-07-13

## 2018-07-13 DIAGNOSIS — N18.4 STAGE 4 CHRONIC KIDNEY DISEASE: ICD-10-CM

## 2018-07-13 DIAGNOSIS — Z99.2 DIALYSIS PATIENT: Primary | ICD-10-CM

## 2018-07-13 NOTE — TELEPHONE ENCOUNTER
----- Message from Lei Pinedo MD sent at 7/13/2018  8:32 AM CDT -----  There is no point seeing her.  You can try referring her to the kidney team to see if they would consider her again.    G  ----- Message -----  From: Violeta Francis RN  Sent: 7/12/2018  11:29 AM  To: Lei Pinedo MD    I spoke with her today. She is going to repeat labs next week.  She says she is on both tac and MMF - but I am not sure she is taking anything except when she is admitted.    While I was speaking with her, she asked me if she was still on the kidney transplant list. I told her she was declined 3 times because of noncompliance. She asked if she could try 1 more time.  Do you want to see her again and discuss compliance?  Is she really a possible candidate?     Violeta

## 2018-07-13 NOTE — TELEPHONE ENCOUNTER
Referral sent.    Spoke with pt. Advised her we will send one last referral but it will be up to the kidney team whether they feel she is a candidate for possible transplant.  Again discussed her long standing history of non compliance. Pt states she is working hard to do everything we ask her to.

## 2018-07-13 NOTE — PHYSICIAN QUERY
PT Name: Holly Patel  MR #: [id number]     Physician Query Form - Documentation Clarification      CDS/: Nikko Santos Jr, RN             Contact information:[email or web address]    This form is a permanent document in the medical record.     Query Date: July 13, 2018    By submitting this query, we are merely seeking further clarification of documentation. Please utilize your independent clinical judgment when addressing the question(s) below.    The Medical record reflects the following:    Supporting Clinical Findings Location in Medical Record   Reports having a blood transfusion after GYN procedure in June. Reports still having vaginal bleeding, saturates ~3 pads a day.    She recently underwent a LEEP procedure in mid June with subsequent bleeding    Acute blood loss anemia    - most likely due to continue vaginal bleeding although it is starting to decrease now  - patient had almost close to appropriate response but Hb still < 7 therefore will plan to transfuse 2 additional units on HD  - discussed with gyn: will continue to monitor and do pad count    Vagina bleeding    - per anemia    7/6 ED Nursing Note        7/7 Hospital Medicine Progress Note      7/7 Hospital Medicine Progress Note                  7/7 Hospital Medicine Progress Note                                                                                      Doctor, Please specify diagnosis or diagnoses associated with above clinical findings.  Specify the Relationship between the Vaginal Bleeding and the LEEP Procedure     Provider Use Only    (  x  )Vaginal Bleeding is a Complication of the LEEP Procedure    (    )Vaginal Bleeding is Not a Complication of the LEEP Procedure    (    )Other____________________________________________________________                                                                                                           [  ] Clinically undetermined

## 2018-07-16 ENCOUNTER — LAB VISIT (OUTPATIENT)
Dept: LAB | Facility: HOSPITAL | Age: 28
End: 2018-07-16
Attending: INTERNAL MEDICINE
Payer: MEDICARE

## 2018-07-16 ENCOUNTER — NURSE TRIAGE (OUTPATIENT)
Dept: ADMINISTRATIVE | Facility: CLINIC | Age: 28
End: 2018-07-16

## 2018-07-16 DIAGNOSIS — Z94.4 LIVER TRANSPLANTED: ICD-10-CM

## 2018-07-16 LAB
ALBUMIN SERPL BCP-MCNC: 2.7 G/DL
ALP SERPL-CCNC: 645 U/L
ALT SERPL W/O P-5'-P-CCNC: 43 U/L
ANION GAP SERPL CALC-SCNC: 12 MMOL/L
AST SERPL-CCNC: 53 U/L
BASOPHILS # BLD AUTO: 0.01 K/UL
BASOPHILS NFR BLD: 0.1 %
BILIRUB SERPL-MCNC: 0.9 MG/DL
BUN SERPL-MCNC: 36 MG/DL
CALCIUM SERPL-MCNC: 9 MG/DL
CHLORIDE SERPL-SCNC: 96 MMOL/L
CO2 SERPL-SCNC: 27 MMOL/L
CREAT SERPL-MCNC: 7.4 MG/DL
DIFFERENTIAL METHOD: ABNORMAL
EOSINOPHIL # BLD AUTO: 0.5 K/UL
EOSINOPHIL NFR BLD: 7.4 %
ERYTHROCYTE [DISTWIDTH] IN BLOOD BY AUTOMATED COUNT: 16.3 %
EST. GFR  (AFRICAN AMERICAN): 7.9 ML/MIN/1.73 M^2
EST. GFR  (NON AFRICAN AMERICAN): 6.9 ML/MIN/1.73 M^2
GLUCOSE SERPL-MCNC: 104 MG/DL
HCT VFR BLD AUTO: 26 %
HGB BLD-MCNC: 8.4 G/DL
IMM GRANULOCYTES # BLD AUTO: 0.03 K/UL
IMM GRANULOCYTES NFR BLD AUTO: 0.4 %
LYMPHOCYTES # BLD AUTO: 0.4 K/UL
LYMPHOCYTES NFR BLD: 6.2 %
MCH RBC QN AUTO: 28.8 PG
MCHC RBC AUTO-ENTMCNC: 32.3 G/DL
MCV RBC AUTO: 89 FL
MONOCYTES # BLD AUTO: 0.5 K/UL
MONOCYTES NFR BLD: 7 %
NEUTROPHILS # BLD AUTO: 5.6 K/UL
NEUTROPHILS NFR BLD: 78.9 %
NRBC BLD-RTO: 0 /100 WBC
PLATELET # BLD AUTO: 86 K/UL
PMV BLD AUTO: 11 FL
POTASSIUM SERPL-SCNC: 4 MMOL/L
PROT SERPL-MCNC: 7.3 G/DL
RBC # BLD AUTO: 2.92 M/UL
SODIUM SERPL-SCNC: 135 MMOL/L
WBC # BLD AUTO: 7.05 K/UL

## 2018-07-16 PROCEDURE — 85025 COMPLETE CBC W/AUTO DIFF WBC: CPT

## 2018-07-16 PROCEDURE — 36415 COLL VENOUS BLD VENIPUNCTURE: CPT | Mod: PO

## 2018-07-16 PROCEDURE — 80053 COMPREHEN METABOLIC PANEL: CPT

## 2018-07-16 PROCEDURE — 80197 ASSAY OF TACROLIMUS: CPT

## 2018-07-16 NOTE — TELEPHONE ENCOUNTER
"Liver Txp 11/8/92.  Pt had LEEP procedure, then had to go back for cervical exam under anesthesia on 7/9 for post procedural bleeding.  She is calling to report vaginal bleeding began again yesterday.  Reason for Disposition   Sounds like a serious complication to the triager    Answer Assessment - Initial Assessment Questions  1. SYMPTOM: "What's the main symptom you're concerned about?" (e.g., pain, fever, vomiting)      Vaginal bleeding  2. ONSET: "When did ________  start?"      yesterday  3. SURGERY: "What surgery was performed?"      Vaginal exam under anesthesia, post op bleeding from LEEP site.  4. DATE of SURGERY: "When was surgery performed?"       7/9/18  5. ANESTHESIA: " What type of anesthesia did you have?" (e.g., general, spinal, epidural, local)      general  6. PAIN: "Is there any pain?" If so, ask: "How bad is it?"  (Scale 1-10; or mild, moderate, severe)      5/10 intermittent abdominal  pain  7. FEVER: "Do you have a fever?" If so, ask: "What is your temperature, how was it measured, and when did it start?"      na  8. VOMITING: "Is there any vomiting?" If yes, ask: "How many times?"      na  9. BLEEDING: "Is there any bleeding?" If so, ask: "How much?" and "Where?"      Yes, bright red, constant running of blood, wearing 2 pads at a time, having to change every 2 hours  10. OTHER SYMPTOMS: "Do you have any other symptoms?" (e.g., drainage from wound, painful urination, constipation)        na    Protocols used: ST POST-OP SYMPTOMS AND [id number]    "

## 2018-07-17 ENCOUNTER — TELEPHONE (OUTPATIENT)
Dept: TRANSPLANT | Facility: CLINIC | Age: 28
End: 2018-07-17

## 2018-07-17 DIAGNOSIS — Z94.4 LIVER TRANSPLANTED: Primary | ICD-10-CM

## 2018-07-17 LAB — TACROLIMUS BLD-MCNC: <1.5 NG/ML

## 2018-07-17 NOTE — LETTER
July 17, 2018    Holly Patel  [address] [place] [id number]          Dear Holly Patel:  MRN: [id number]    Your liver numbers are stable.  Your Prograf level was undetected in your blood stream.  You need to make sure you are taking every dose, every day to prevent rejection and liver failure.    Repeat labs 8/20/18.    If you cannot have your labs drawn on the scheduled date, it is your responsibility to call the transplant department to reschedule.  To reschedule or make an appointment, please as to speak to or leave a message for my assistant, Jaki Chavis, at [phone].  When leaving a message for Palmira Pollack, or myself, we ask that you leave a brief message regarding your request.    Sincerely,      Violeta Francis, RN, BSN  Liver Transplant Coordinator    [facility]  [address] [place] [id number]  [phone]

## 2018-07-17 NOTE — TELEPHONE ENCOUNTER
Prograf level remains undetectable.    Letter sent, prograf level undetected. Advised of importance of medication compliance. Requested repeat labs in 1 month - 8/20/18.

## 2018-07-18 ENCOUNTER — TELEPHONE (OUTPATIENT)
Dept: OBSTETRICS AND GYNECOLOGY | Facility: CLINIC | Age: 28
End: 2018-07-18

## 2018-07-18 NOTE — TELEPHONE ENCOUNTER
Spoke with pt. States that she is using two pads per hour. After inquiring, pt stated that her bp was elevated at dialysis on yesterday. Asked that pt take bp while I held on. Pt's blood pressure reading was 218/140. Pt had not taken her 2:00 scheduled does of  bp medicine at the time of the call at 2:17pm. Dr. Marroquin explained the importance of taking her medicine on time and instructed her to take her dose. Pt stated that she took the pill while speaking with her.   ----- Message from Arlet Le sent at 7/18/2018  1:45 PM CDT -----  Contact: Pt Mom (Chani)            Name of Who is Calling: Pt Mom (Chani)      What is the request in detail: states pt has been experiencing vaginal bleeding since her surgery on 06/15/18. Pt Ruddy Wilde is needing to speak with Dr. Marroquin today      Can the clinic reply by [facility]: no      What Number to Call Back if not in [facility]: [phone]

## 2018-07-20 ENCOUNTER — HOSPITAL ENCOUNTER (INPATIENT)
Facility: OTHER | Age: 28
LOS: 2 days | Discharge: HOME | DRG: 981 | End: 2018-07-23
Attending: EMERGENCY MEDICINE | Admitting: OBSTETRICS & GYNECOLOGY
Payer: MEDICARE

## 2018-07-20 DIAGNOSIS — I10 HYPERTENSION, POOR CONTROL: ICD-10-CM

## 2018-07-20 DIAGNOSIS — N18.6 ESRD (END STAGE RENAL DISEASE): ICD-10-CM

## 2018-07-20 DIAGNOSIS — D64.9 SYMPTOMATIC ANEMIA: Primary | ICD-10-CM

## 2018-07-20 DIAGNOSIS — N93.9 VAGINAL BLEEDING: ICD-10-CM

## 2018-07-20 LAB
ABO + RH BLD: NORMAL
ABO + RH BLD: NORMAL
ALBUMIN SERPL BCP-MCNC: 2.5 G/DL
ALP SERPL-CCNC: 529 U/L
ALT SERPL W/O P-5'-P-CCNC: 35 U/L
ANION GAP SERPL CALC-SCNC: 8 MMOL/L
APTT BLDCRRT: 27.1 SEC
AST SERPL-CCNC: 43 U/L
BASOPHILS # BLD AUTO: 0.01 K/UL
BASOPHILS NFR BLD: 0.2 %
BILIRUB SERPL-MCNC: 0.6 MG/DL
BLD GP AB SCN CELLS X3 SERPL QL: NORMAL
BLD GP AB SCN CELLS X3 SERPL QL: NORMAL
BUN SERPL-MCNC: 23 MG/DL
CALCIUM SERPL-MCNC: 7.8 MG/DL
CHLORIDE SERPL-SCNC: 102 MMOL/L
CO2 SERPL-SCNC: 30 MMOL/L
CREAT SERPL-MCNC: 5.8 MG/DL
DIFFERENTIAL METHOD: ABNORMAL
EOSINOPHIL # BLD AUTO: 0.4 K/UL
EOSINOPHIL NFR BLD: 10.3 %
ERYTHROCYTE [DISTWIDTH] IN BLOOD BY AUTOMATED COUNT: 15.9 %
EST. GFR  (AFRICAN AMERICAN): 11 ML/MIN/1.73 M^2
EST. GFR  (NON AFRICAN AMERICAN): 9 ML/MIN/1.73 M^2
GLUCOSE SERPL-MCNC: 102 MG/DL
HCT VFR BLD AUTO: 20.3 %
HGB BLD-MCNC: 6.7 G/DL
INR PPP: 1.1
LYMPHOCYTES # BLD AUTO: 0.6 K/UL
LYMPHOCYTES NFR BLD: 13.5 %
MCH RBC QN AUTO: 28 PG
MCHC RBC AUTO-ENTMCNC: 33 G/DL
MCV RBC AUTO: 85 FL
MONOCYTES # BLD AUTO: 0.2 K/UL
MONOCYTES NFR BLD: 4.7 %
NEUTROPHILS # BLD AUTO: 2.9 K/UL
NEUTROPHILS NFR BLD: 71.3 %
PLATELET # BLD AUTO: 70 K/UL
PMV BLD AUTO: 10.1 FL
POTASSIUM SERPL-SCNC: 3.3 MMOL/L
PROT SERPL-MCNC: 6.6 G/DL
PROTHROMBIN TIME: 11 SEC
RBC # BLD AUTO: 2.39 M/UL
SODIUM SERPL-SCNC: 140 MMOL/L
WBC # BLD AUTO: 4.07 K/UL

## 2018-07-20 PROCEDURE — 63600175 PHARM REV CODE 636 W HCPCS: Performed by: STUDENT IN AN ORGANIZED HEALTH CARE EDUCATION/TRAINING PROGRAM

## 2018-07-20 PROCEDURE — 25000003 PHARM REV CODE 250: Performed by: STUDENT IN AN ORGANIZED HEALTH CARE EDUCATION/TRAINING PROGRAM

## 2018-07-20 PROCEDURE — 99285 EMERGENCY DEPT VISIT HI MDM: CPT | Mod: 25

## 2018-07-20 PROCEDURE — 85730 THROMBOPLASTIN TIME PARTIAL: CPT

## 2018-07-20 PROCEDURE — 86901 BLOOD TYPING SEROLOGIC RH(D): CPT | Mod: 91

## 2018-07-20 PROCEDURE — 85610 PROTHROMBIN TIME: CPT

## 2018-07-20 PROCEDURE — 99220 PR INITIAL OBSERVATION CARE,LEVL III: CPT | Mod: 25,,, | Performed by: OBSTETRICS & GYNECOLOGY

## 2018-07-20 PROCEDURE — 86920 COMPATIBILITY TEST SPIN: CPT

## 2018-07-20 PROCEDURE — 25000003 PHARM REV CODE 250: Performed by: EMERGENCY MEDICINE

## 2018-07-20 PROCEDURE — P9021 RED BLOOD CELLS UNIT: HCPCS

## 2018-07-20 PROCEDURE — 36430 TRANSFUSION BLD/BLD COMPNT: CPT

## 2018-07-20 PROCEDURE — 86901 BLOOD TYPING SEROLOGIC RH(D): CPT

## 2018-07-20 PROCEDURE — G0378 HOSPITAL OBSERVATION PER HR: HCPCS

## 2018-07-20 PROCEDURE — 85025 COMPLETE CBC W/AUTO DIFF WBC: CPT

## 2018-07-20 PROCEDURE — 80053 COMPREHEN METABOLIC PANEL: CPT

## 2018-07-20 RX ORDER — NIFEDIPINE 30 MG/1
60 TABLET, EXTENDED RELEASE ORAL DAILY
Status: DISCONTINUED | OUTPATIENT
Start: 2018-07-21 | End: 2018-07-23 | Stop reason: HOSPADM

## 2018-07-20 RX ORDER — CLONIDINE HYDROCHLORIDE 0.1 MG/1
0.2 TABLET ORAL
Status: COMPLETED | OUTPATIENT
Start: 2018-07-20 | End: 2018-07-20

## 2018-07-20 RX ORDER — PREDNISONE 1 MG/1
1 TABLET ORAL EVERY OTHER DAY
Status: DISCONTINUED | OUTPATIENT
Start: 2018-07-21 | End: 2018-07-23 | Stop reason: HOSPADM

## 2018-07-20 RX ORDER — CINACALCET 30 MG/1
30 TABLET, FILM COATED ORAL
Status: DISCONTINUED | OUTPATIENT
Start: 2018-07-21 | End: 2018-07-23 | Stop reason: HOSPADM

## 2018-07-20 RX ORDER — LABETALOL 100 MG/1
200 TABLET, FILM COATED ORAL EVERY 8 HOURS
Status: DISCONTINUED | OUTPATIENT
Start: 2018-07-20 | End: 2018-07-20

## 2018-07-20 RX ORDER — OXYCODONE AND ACETAMINOPHEN 5; 325 MG/1; MG/1
2 TABLET ORAL ONCE
Status: COMPLETED | OUTPATIENT
Start: 2018-07-20 | End: 2018-07-20

## 2018-07-20 RX ORDER — LABETALOL 200 MG/1
200 TABLET, FILM COATED ORAL EVERY 8 HOURS
Status: DISCONTINUED | OUTPATIENT
Start: 2018-07-21 | End: 2018-07-23 | Stop reason: HOSPADM

## 2018-07-20 RX ORDER — HYDRALAZINE HYDROCHLORIDE 25 MG/1
100 TABLET, FILM COATED ORAL EVERY 8 HOURS
Status: DISCONTINUED | OUTPATIENT
Start: 2018-07-20 | End: 2018-07-23 | Stop reason: HOSPADM

## 2018-07-20 RX ORDER — SODIUM CHLORIDE 0.9 % (FLUSH) 0.9 %
3 SYRINGE (ML) INJECTION
Status: DISCONTINUED | OUTPATIENT
Start: 2018-07-20 | End: 2018-07-23 | Stop reason: HOSPADM

## 2018-07-20 RX ORDER — HYDRALAZINE HYDROCHLORIDE 25 MG/1
100 TABLET, FILM COATED ORAL
Status: COMPLETED | OUTPATIENT
Start: 2018-07-20 | End: 2018-07-20

## 2018-07-20 RX ORDER — ONDANSETRON 8 MG/1
8 TABLET, ORALLY DISINTEGRATING ORAL EVERY 8 HOURS PRN
Status: DISCONTINUED | OUTPATIENT
Start: 2018-07-20 | End: 2018-07-23 | Stop reason: HOSPADM

## 2018-07-20 RX ORDER — MYCOPHENOLATE MOFETIL 250 MG/1
1000 CAPSULE ORAL 2 TIMES DAILY
Status: DISCONTINUED | OUTPATIENT
Start: 2018-07-20 | End: 2018-07-23 | Stop reason: HOSPADM

## 2018-07-20 RX ORDER — NAPROXEN SODIUM 220 MG/1
81 TABLET, FILM COATED ORAL DAILY
Status: DISCONTINUED | OUTPATIENT
Start: 2018-07-21 | End: 2018-07-23 | Stop reason: HOSPADM

## 2018-07-20 RX ORDER — HYDROCODONE BITARTRATE AND ACETAMINOPHEN 500; 5 MG/1; MG/1
TABLET ORAL
Status: DISCONTINUED | OUTPATIENT
Start: 2018-07-20 | End: 2018-07-21

## 2018-07-20 RX ORDER — TACROLIMUS 1 MG/1
7 CAPSULE ORAL 2 TIMES DAILY
Status: DISCONTINUED | OUTPATIENT
Start: 2018-07-20 | End: 2018-07-23 | Stop reason: HOSPADM

## 2018-07-20 RX ORDER — LISINOPRIL 20 MG/1
40 TABLET ORAL DAILY
Status: DISCONTINUED | OUTPATIENT
Start: 2018-07-21 | End: 2018-07-23 | Stop reason: HOSPADM

## 2018-07-20 RX ORDER — LABETALOL 100 MG/1
400 TABLET, FILM COATED ORAL
Status: COMPLETED | OUTPATIENT
Start: 2018-07-20 | End: 2018-07-20

## 2018-07-20 RX ADMIN — HYDRALAZINE HYDROCHLORIDE 100 MG: 25 TABLET, FILM COATED ORAL at 06:07

## 2018-07-20 RX ADMIN — OXYCODONE HYDROCHLORIDE AND ACETAMINOPHEN 2 TABLET: 5; 325 TABLET ORAL at 07:07

## 2018-07-20 RX ADMIN — TACROLIMUS 7 MG: 1 CAPSULE ORAL at 09:07

## 2018-07-20 RX ADMIN — HYDRALAZINE HYDROCHLORIDE 100 MG: 25 TABLET, FILM COATED ORAL at 09:07

## 2018-07-20 RX ADMIN — CLONIDINE HYDROCHLORIDE 0.2 MG: 0.1 TABLET ORAL at 02:07

## 2018-07-20 RX ADMIN — LABETALOL HYDROCHLORIDE 400 MG: 100 TABLET, FILM COATED ORAL at 06:07

## 2018-07-20 RX ADMIN — FERRIC SUBSULFATE 8 ML: 259 SOLUTION TOPICAL at 07:07

## 2018-07-20 RX ADMIN — MYCOPHENOLATE MOFETIL 1000 MG: 250 CAPSULE ORAL at 09:07

## 2018-07-20 NOTE — ED TRIAGE NOTES
"Pt had LEEP in June for abnormal pap smear.  Pt has had bleeding since.  Has been back to [facility] where they stitched area, stopped bleeding for couple of days, then on July 9th they "patched" the area, bleeding was stopped for several days again.  Started new bleeding several days ago, going through 2 pads every hour.  Has had blood transfusion since initial surgery due to blood loss.  Hx: CRF w/ dialysis on T,T,S.  Liver transplant.  Denies pain at this time.  "

## 2018-07-20 NOTE — ED PROVIDER NOTES
Encounter Date: 2018    SCRIBE #1 NOTE: Jeni HARDY, lyndsay scribing for, and in the presence of, Dr. Bates.       History     Chief Complaint   Patient presents with    Vaginal Bleeding     Pt reported to ed with co vaginal bleeding (2 pads / hour had a procedure (LEEP) , on Kandis 15,2018 and she is still bleeding since .     Time seen by provider: 5:23 PM    This is a 27 y.o. female, with hx of ESRD on hemodialysis, liver transplant, renovascular HTN, anemia, and thrombocytopenia, who presents with complaint of vaginal bleeding since undergoing LEEP on 6/15. Per EMS, pt given clonidine 0.2 mg PO and wears transdermal clonidine patch. SBP was 200 on EMS arrival and is high 100s to 200s at baseline. She saturates two menstrual pads per hour. She denies fever, blurred vision, SOB, chest pain, N/V/D, or headache. She is dialyzed Tuesday, Thursday, and Saturday. Her nephrologist is Dr. Neil at [facility]. She reports PSHx of liver transplant at 2 y/o. She is on Prograf. A0.      The history is provided by the patient and the EMS personnel.     Review of patient's allergies indicates:   Allergen Reactions    Chloral hydrate      Other reaction(s): Hallucinations  Other reaction(s): Hives    Hydrocodone Other (See Comments)     Mental status changes     Past Medical History:   Diagnosis Date    Anemia in ESRD (end-stage renal disease) 10/12/2015    dialysis tues, thursday, sat; access left arm    Chronic rejection of liver transplant 3/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 ()    Moderate  protein-calorie malnutrition 2017    MRSA bacteremia 2017    Pneumonia     Prophylactic immunotherapy 2014    Renovascular hypertension 10/2/2015    Secondary hyperparathyroidism 2017    Seizures     Sialadenitis 3/21/2018    Thrombocytopenia 2016     Past Surgical History:   Procedure Laterality Date     SECTION      x 2    CONIZATION OF CERVIX USING LOOP ELECTROSURGICAL EXCISION PROCEDURE (LEEP) N/A 6/15/2018    Procedure: CONIZATION-CERVICAL-LEEP;  Surgeon: Neelam Marroquin MD;  Location: [facility] OR;  Service: OB/GYN;  Laterality: N/A;    EXAMINATION UNDER ANESTHESIA N/A 2018    Procedure: Exam under anesthesia;  Surgeon: Neelam Marroquin MD;  Location: [facility] OR;  Service: OB/GYN;  Laterality: N/A;    EXAMINATION UNDER ANESTHESIA N/A 2018    Procedure: Exam under anesthesia (ADD ON );  Surgeon: NICKIE Alvaerz MD;  Location: [facility] OR;  Service: OB/GYN;  Laterality: N/A;  (ADD ON )    LIVER BIOPSY      LIVER TRANSPLANT  1992    PERINEORRHAPHY  2018    Procedure: SUTURE REPAIR,CERVIX;  Surgeon: Neelam Marroquin MD;  Location: [facility] OR;  Service: OB/GYN;;    TUBAL LIGATION       Family History   Problem Relation Age of Onset    Hypertension Mother     Hypertension Father     Melanoma Neg Hx     Breast cancer Neg Hx     Colon cancer Neg Hx     Ovarian cancer Neg Hx      Social History   Substance Use Topics    Smoking status: Never Smoker    Smokeless tobacco: Never Used    Alcohol use No     Review of Systems   Constitutional: Negative for chills, diaphoresis and fever.   HENT: Negative for sore throat.    Eyes: Negative for visual disturbance.   Respiratory: Negative for shortness of breath.    Cardiovascular: Negative for chest pain.   Gastrointestinal: Negative for diarrhea, nausea and vomiting.   Genitourinary: Positive for vaginal bleeding. Negative for dysuria.   Musculoskeletal: Negative for back pain.   Skin: Negative for  rash.   Neurological: Negative for weakness and headaches.   Hematological: Does not bruise/bleed easily.       Physical Exam     Initial Vitals [07/20/18 0953]   BP Pulse Resp Temp SpO2   (!) 186/116 97 18 98.8 °F (37.1 °C) 100 %      MAP       --         Physical Exam    Nursing note and vitals reviewed.  Constitutional: She appears well-developed and well-nourished. She is not diaphoretic. No distress.   HENT:   Head: Normocephalic and atraumatic.   Mouth/Throat: Mucous membranes are dry.   Eyes: EOM are normal.   Mild pallor. No icterus.   Neck: Normal range of motion. Neck supple.   Cardiovascular: Normal rate, regular rhythm and normal heart sounds. Exam reveals no gallop and no friction rub.    No murmur heard.  Pulmonary/Chest: Breath sounds normal. No respiratory distress. She has no wheezes. She has no rhonchi. She has no rales.   Abdominal: Soft. Bowel sounds are normal. She exhibits no distension. There is no tenderness. There is no rebound and no guarding.   Genitourinary:   Genitourinary Comments: Pelvic exam deferred to GYN service.   Musculoskeletal: Normal range of motion. She exhibits no edema or tenderness.   Lymphadenopathy:     She has no cervical adenopathy.   Neurological: She is alert and oriented to person, place, and time.   Skin: Skin is warm and dry.   AV shunt left forearm with palpable thrill.         ED Course   Procedures  Labs Reviewed   CBC W/ AUTO DIFFERENTIAL - Abnormal; Notable for the following:        Result Value    RBC 2.39 (*)     Hemoglobin 6.7 (*)     Hematocrit 20.3 (*)     RDW 15.9 (*)     Platelets 70 (*)     Lymph # 0.6 (*)     Mono # 0.2 (*)     Lymph% 13.5 (*)     Eosinophil% 10.3 (*)     All other components within normal limits   COMPREHENSIVE METABOLIC PANEL - Abnormal; Notable for the following:     Potassium 3.3 (*)     CO2 30 (*)     BUN, Bld 23 (*)     Creatinine 5.8 (*)     Calcium 7.8 (*)     Albumin 2.5 (*)     Alkaline Phosphatase 529 (*)     AST 43 (*)      eGFR if  11 (*)     eGFR if non  9 (*)     All other components within normal limits   PROTIME-INR   APTT   TYPE & SCREEN   TYPE & SCREEN   PREPARE RBC SOFT   PREPARE RBC SOFT   PREPARE RBC SOFT          Imaging Results    None          Medical Decision Making:   Clinical Tests:   Lab Tests: Reviewed  ED Management:  5:35 PM - Discussed case with Dr. Forbes of hospitalist service, and agrees with plan to restart oral antihypertensives as patient has chronically difficulty to control blood pressure and no evidence of hypertensive emergency. From a medical standpoint, patient appropriate for MedSurg floor.    5:59 PM - Paged GYN.    6:05 PM - Discussed case with Dr. Samano, and will evaluate patient shortly.    6:43 PM - Seen by OB-GYN and will admit patient to Dr. Guevara.  Other:   I have discussed this case with another health care provider.            Scribe Attestation:   Scribe #1: I performed the above scribed service and the documentation accurately describes the services I performed. I attest to the accuracy of the note.    Attending Attestation:           Physician Attestation for Scribe:  Physician Attestation Statement for Scribe #1: I, Dr. Bates, reviewed documentation, as scribed by Jeni Lara in my presence, and it is both accurate and complete.           Patient transferred From [facility] due to persistent vaginal bleeding after a LEEP procedure several weeks ago.  Complicating medical factors of end-stage renal disease on dialysis, status post liver transplant 1 years old currently on Prograf however and she reports chronically elevated blood pressure frequently in the 200/1 0s.  Upon arrival blood pressure is elevated however she has missed the past few doses of her home blood pressure medications which are relatively high dosages.  She will be given these.  She does not have a headache chest pain shortness of breath or other symptoms to suggest hypertensive  emergency.  She is on schedule with her dialysis and clinical exam does not suggest volume overload.  No JVD.  No basilar rales.  She currently does not have any abdominal pain, other than vaginal bleeding currently asymptomatic.  Laboratory studies show hemoglobin is at the lower range of her normal anemia which frequently ranges from 6-8.  She is currently receiving 1st unit of packed red blood cells.  In light of her dialysis status we will observe after this unit.  Should she need further blood given it may be better to receive this concomitantly with dialysis.  Case discussed with the hospitalist service.  They will evaluate the patient but no further requests a recommendations at this point.  Case discussed with the gyn service who has evaluated the patient and will admit to their service for further management of the vaginal bleeding             Clinical Impression:     1. Symptomatic anemia    2. ESRD (end stage renal disease)    3. Vaginal bleeding    4. Hypertension, poor control                                 Ac Bates II, MD  07/20/18 2057

## 2018-07-20 NOTE — ED NOTES
Reported received from ana Cheng. Pt lying in bed, respirations even/unlabored. NAD noted. Pt denies any current needs at this time. Side rails upx2, call bell within reach. Will continue to monitor.

## 2018-07-20 NOTE — ED PROVIDER NOTES
Encounter Date: 7/20/2018     This is a 27 y.o. female complaining of vaginal bleeding that has been ongoing for a little over a month following LEEP and EUA performed at [facility]. Has been seen by Ob/Gyn at [facility] for follow up since the initial procedure. Bleeding has been controlled at several points post-op but bleeding began again about a week ago. History of ESRD and chronic immunosuppression secondary to liver transplant. Last dialysis was yesterday.    I have evaluated and conducted a medical screening exam with initial orders entered, if indicated, to expedite care. The patient will be placed in a treatment area when one is available. Care will be transferred to an alternate provider for a full assessment including but not limited to: history, physical exam, additional orders, and final disposition.    Mt Mccord NP      SCRIBE #1 NOTE: IHarry am scribing for, and in the presence of,  Antoni Noe MD. I have scribed the following portions of the note - Other sections scribed: ROS, HPI.       History     Chief Complaint   Patient presents with    Vaginal Bleeding     Pt reported to ed with co vaginal bleeding (2 pads / hour had a procedure (LEEP) , on Kandis 15,2018 and she is still bleeding since July 11,2018.     CC: Vaginal Bleeding    HPI: Patient is a 27 y.o. F with a past medical history of Anemia; ESRD on hemodialysis; Liver transplant on chronic immunosuppression; Renovascular hypertension; Secondary hyperparathyroidism; Seizures; Splenomegaly; and Thrombocytopenia who presents to the ED for evaluation of vaginal bleeding since undergoing LEEP procedure 6/15/18 performed by Dr. Urias. She has followed-up with Dr. Urias for patching/stitching of the cervix but is still saturating 2 pads per hour. She was admitted for a blood transfusion 2 weeks ago. She currently complains of dizziness but denies any pain and/or light-headedness. She is an ESRD patient undergoing  hemodialysis (, , Sat).       The history is provided by the patient. No  was used.     Review of patient's allergies indicates:   Allergen Reactions    Chloral hydrate      Other reaction(s): Hallucinations  Other reaction(s): Hives    Hydrocodone Other (See Comments)     Mental status changes     Past Medical History:   Diagnosis Date    Anemia in ESRD (end-stage renal disease) 10/12/2015    dialysis tues, thursday, sat; access left arm    Chronic rejection of liver transplant 3/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 ()    Moderate protein-calorie malnutrition 2017    MRSA bacteremia 2017    Pneumonia     Prophylactic immunotherapy 2014    Renovascular hypertension 10/2/2015    Secondary hyperparathyroidism 2017    Seizures     Sialadenitis 3/21/2018    Thrombocytopenia 2016     Past Surgical History:   Procedure Laterality Date     SECTION      x 2    CONIZATION OF CERVIX USING LOOP ELECTROSURGICAL EXCISION PROCEDURE (LEEP) N/A 6/15/2018    Procedure: CONIZATION-CERVICAL-LEEP;  Surgeon: Neelam Marroquin MD;  Location: [facility];  Service: OB/GYN;  Laterality: N/A;    EXAMINATION UNDER ANESTHESIA N/A 2018    Procedure: Exam under anesthesia;  Surgeon: Neelam Marroquin MD;  Location: [facility] [place] OR;  Service: OB/GYN;  Laterality: N/A;    EXAMINATION UNDER ANESTHESIA N/A 2018    Procedure: Exam under anesthesia (ADD ON );  Surgeon: NICKIE Alvarez MD;  Location: [facility] [place] OR;  Service: OB/GYN;  Laterality: N/A;  (ADD ON )    LIVER BIOPSY       LIVER TRANSPLANT  09/1992    PERINEORRHAPHY  6/19/2018    Procedure: SUTURE REPAIR,CERVIX;  Surgeon: Neelam Marroquin MD;  Location: [facility];  Service: OB/GYN;;    TUBAL LIGATION  2010     Family History   Problem Relation Age of Onset    Hypertension Mother     Hypertension Father     Melanoma Neg Hx     Breast cancer Neg Hx     Colon cancer Neg Hx     Ovarian cancer Neg Hx      Social History   Substance Use Topics    Smoking status: Never Smoker    Smokeless tobacco: Never Used    Alcohol use No     Review of Systems   Constitutional: Negative for fever.   HENT: Negative for sore throat.    Eyes: Negative for visual disturbance.   Respiratory: Negative for shortness of breath.    Cardiovascular: Negative for chest pain.   Gastrointestinal: Negative for abdominal pain, nausea and vomiting.   Genitourinary: Positive for vaginal bleeding. Negative for vaginal pain.   Musculoskeletal: Negative for back pain.   Skin: Negative for rash.   Neurological: Positive for dizziness. Negative for light-headedness.       Physical Exam     Initial Vitals [07/20/18 0953]   BP Pulse Resp Temp SpO2   (!) 186/116 97 18 98.8 °F (37.1 °C) 100 %      MAP       --         Physical Exam    Nursing note and vitals reviewed.  Constitutional: She appears well-developed and well-nourished. She is not diaphoretic. No distress.   HENT:   Head: Normocephalic and atraumatic.   Nose: Nose normal.   Eyes: EOM are normal. Pupils are equal, round, and reactive to light. No scleral icterus.   Conjunctival pallor.   Neck: Normal range of motion. Neck supple.   Cardiovascular: Normal rate, regular rhythm and intact distal pulses. Exam reveals no gallop and no friction rub.    Murmur (across R sternal border) heard.   Systolic (flow) murmur is present with a grade of 3/6   Pulmonary/Chest: Breath sounds normal. No stridor. No respiratory distress. She has no wheezes. She has no rhonchi. She has no rales.   Abdominal: Soft. Normal  appearance and bowel sounds are normal. She exhibits no distension. There is no tenderness. There is no rebound and no guarding.   Genitourinary:   Genitourinary Comments: Dark red blood filling the vault.  Active bleeding. Unable to visualize the cervix.   Musculoskeletal: Normal range of motion. She exhibits no edema (peripherally) or tenderness.   Palpable thrill over L forearm fistula.   Neurological: She is oriented to person, place, and time. No cranial nerve deficit.   Skin: Skin is warm and dry. No rash noted.   Psychiatric: She has a normal mood and affect. Her behavior is normal.         ED Course   Procedures  Labs Reviewed   CBC W/ AUTO DIFFERENTIAL - Abnormal; Notable for the following:        Result Value    RBC 2.39 (*)     Hemoglobin 6.7 (*)     Hematocrit 20.3 (*)     RDW 15.9 (*)     Platelets 70 (*)     Lymph # 0.6 (*)     Mono # 0.2 (*)     Lymph% 13.5 (*)     Eosinophil% 10.3 (*)     All other components within normal limits   COMPREHENSIVE METABOLIC PANEL - Abnormal; Notable for the following:     Potassium 3.3 (*)     CO2 30 (*)     BUN, Bld 23 (*)     Creatinine 5.8 (*)     Calcium 7.8 (*)     Albumin 2.5 (*)     Alkaline Phosphatase 529 (*)     AST 43 (*)     eGFR if  11 (*)     eGFR if non  9 (*)     All other components within normal limits   PROTIME-INR   APTT   TYPE & SCREEN   PREPARE RBC SOFT          Imaging Results    None          Medical Decision Making:   ED Management:  Will init transfusion, Pt is hypokalemic. Discussed with Dr. Collazo and accepted at [facility]. Pt anuric, so will transfuse very slowly. Previous bleeding assoc with htn. Will offer bp control - starting with clonidine.             Scribe Attestation:   Scribe #1: I performed the above scribed service and the documentation accurately describes the services I performed. I attest to the accuracy of the note.    Attending Attestation:           Physician Attestation for Scribe:  Physician  Attestation Statement for Scribe #1: I, Antoni Noe MD, reviewed documentation, as scribed by Harry Proctor in my presence, and it is both accurate and complete.                    Clinical Impression:   The primary encounter diagnosis was Symptomatic anemia. Diagnoses of ESRD (end stage renal disease) and Vaginal bleeding were also pertinent to this visit.                             Antoni Noe MD  07/20/18 6344       Antoni Noe MD  07/20/18 1560

## 2018-07-20 NOTE — ED NOTES
Pt [id number]---consent for Blood Transfusion and consent for Transfer to [facility] signed by patient

## 2018-07-20 NOTE — ED NOTES
Pt arrived via ambulance as transfer from [facility]. Pt sent for further eval and tx of persistent vaginal bleeding. Pt receiving blood transfusion on arrival. Tolerating transfusion well with no suspected reaction. Pt has no complaints at this time.

## 2018-07-21 ENCOUNTER — SURGERY (OUTPATIENT)
Age: 28
End: 2018-07-21

## 2018-07-21 LAB
ALBUMIN SERPL BCP-MCNC: 2.3 G/DL
ALP SERPL-CCNC: 471 U/L
ALT SERPL W/O P-5'-P-CCNC: 36 U/L
ANION GAP SERPL CALC-SCNC: 10 MMOL/L
AST SERPL-CCNC: 45 U/L
BASOPHILS # BLD AUTO: 0.01 K/UL
BASOPHILS NFR BLD: 0.2 %
BILIRUB SERPL-MCNC: 0.6 MG/DL
BLD PROD TYP BPU: NORMAL
BLD PROD TYP BPU: NORMAL
BLOOD UNIT EXPIRATION DATE: NORMAL
BLOOD UNIT EXPIRATION DATE: NORMAL
BLOOD UNIT TYPE CODE: 5100
BLOOD UNIT TYPE CODE: 5100
BLOOD UNIT TYPE: NORMAL
BLOOD UNIT TYPE: NORMAL
BUN SERPL-MCNC: 32 MG/DL
CALCIUM SERPL-MCNC: 8.2 MG/DL
CHLORIDE SERPL-SCNC: 103 MMOL/L
CO2 SERPL-SCNC: 25 MMOL/L
CODING SYSTEM: NORMAL
CODING SYSTEM: NORMAL
CREAT SERPL-MCNC: 7 MG/DL
DIFFERENTIAL METHOD: ABNORMAL
DISPENSE STATUS: NORMAL
DISPENSE STATUS: NORMAL
EOSINOPHIL # BLD AUTO: 0.4 K/UL
EOSINOPHIL NFR BLD: 10.2 %
ERYTHROCYTE [DISTWIDTH] IN BLOOD BY AUTOMATED COUNT: 16.5 %
EST. GFR  (AFRICAN AMERICAN): 8 ML/MIN/1.73 M^2
EST. GFR  (NON AFRICAN AMERICAN): 7 ML/MIN/1.73 M^2
GLUCOSE SERPL-MCNC: 101 MG/DL
HCT VFR BLD AUTO: 20.6 %
HGB BLD-MCNC: 6.6 G/DL
LYMPHOCYTES # BLD AUTO: 0.5 K/UL
LYMPHOCYTES NFR BLD: 11.3 %
MCH RBC QN AUTO: 27 PG
MCHC RBC AUTO-ENTMCNC: 32 G/DL
MCV RBC AUTO: 84 FL
MONOCYTES # BLD AUTO: 0.2 K/UL
MONOCYTES NFR BLD: 4.4 %
NEUTROPHILS # BLD AUTO: 3.2 K/UL
NEUTROPHILS NFR BLD: 73.9 %
NUM UNITS TRANS PACKED RBC: NORMAL
NUM UNITS TRANS PACKED RBC: NORMAL
PLATELET # BLD AUTO: 55 K/UL
PMV BLD AUTO: 10.1 FL
POTASSIUM SERPL-SCNC: 4.4 MMOL/L
PROT SERPL-MCNC: 6 G/DL
RBC # BLD AUTO: 2.44 M/UL
SODIUM SERPL-SCNC: 138 MMOL/L
WBC # BLD AUTO: 4.33 K/UL

## 2018-07-21 PROCEDURE — P9016 RBC LEUKOCYTES REDUCED: HCPCS

## 2018-07-21 PROCEDURE — C1769 GUIDE WIRE: HCPCS | Performed by: RADIOLOGY

## 2018-07-21 PROCEDURE — 80053 COMPREHEN METABOLIC PANEL: CPT

## 2018-07-21 PROCEDURE — 63600175 PHARM REV CODE 636 W HCPCS: Performed by: STUDENT IN AN ORGANIZED HEALTH CARE EDUCATION/TRAINING PROGRAM

## 2018-07-21 PROCEDURE — 11000001 HC ACUTE MED/SURG PRIVATE ROOM

## 2018-07-21 PROCEDURE — 94761 N-INVAS EAR/PLS OXIMETRY MLT: CPT

## 2018-07-21 PROCEDURE — 25000003 PHARM REV CODE 250

## 2018-07-21 PROCEDURE — 27201224 HC CATH ANGIOGRAM: Performed by: RADIOLOGY

## 2018-07-21 PROCEDURE — 04LF3DU OCCLUSION OF LEFT UTERINE ARTERY WITH INTRALUMINAL DEVICE, PERCUTANEOUS APPROACH: ICD-10-PCS | Performed by: RADIOLOGY

## 2018-07-21 PROCEDURE — 85025 COMPLETE CBC W/AUTO DIFF WBC: CPT

## 2018-07-21 PROCEDURE — 80100016 HC MAINTENANCE HEMODIALYSIS

## 2018-07-21 PROCEDURE — 36415 COLL VENOUS BLD VENIPUNCTURE: CPT

## 2018-07-21 PROCEDURE — C1894 INTRO/SHEATH, NON-LASER: HCPCS | Performed by: RADIOLOGY

## 2018-07-21 PROCEDURE — 36430 TRANSFUSION BLD/BLD COMPNT: CPT

## 2018-07-21 PROCEDURE — 99223 1ST HOSP IP/OBS HIGH 75: CPT | Mod: ,,, | Performed by: OBSTETRICS & GYNECOLOGY

## 2018-07-21 PROCEDURE — B41D1ZZ FLUOROSCOPY OF AORTA AND BILATERAL LOWER EXTREMITY ARTERIES USING LOW OSMOLAR CONTRAST: ICD-10-PCS | Performed by: RADIOLOGY

## 2018-07-21 PROCEDURE — 99153 MOD SED SAME PHYS/QHP EA: CPT | Performed by: RADIOLOGY

## 2018-07-21 PROCEDURE — 25000003 PHARM REV CODE 250: Performed by: INTERNAL MEDICINE

## 2018-07-21 PROCEDURE — 25000003 PHARM REV CODE 250: Performed by: STUDENT IN AN ORGANIZED HEALTH CARE EDUCATION/TRAINING PROGRAM

## 2018-07-21 PROCEDURE — 25000003 PHARM REV CODE 250: Performed by: RADIOLOGY

## 2018-07-21 PROCEDURE — 04LE3DT OCCLUSION OF RIGHT UTERINE ARTERY WITH INTRALUMINAL DEVICE, PERCUTANEOUS APPROACH: ICD-10-PCS | Performed by: RADIOLOGY

## 2018-07-21 PROCEDURE — C1887 CATHETER, GUIDING: HCPCS | Performed by: RADIOLOGY

## 2018-07-21 PROCEDURE — 63600175 PHARM REV CODE 636 W HCPCS: Performed by: RADIOLOGY

## 2018-07-21 PROCEDURE — 63600175 PHARM REV CODE 636 W HCPCS

## 2018-07-21 PROCEDURE — 25500020 PHARM REV CODE 255

## 2018-07-21 PROCEDURE — 25000003 PHARM REV CODE 250: Performed by: OBSTETRICS & GYNECOLOGY

## 2018-07-21 PROCEDURE — 25500020 PHARM REV CODE 255: Performed by: RADIOLOGY

## 2018-07-21 PROCEDURE — 99152 MOD SED SAME PHYS/QHP 5/>YRS: CPT | Performed by: RADIOLOGY

## 2018-07-21 PROCEDURE — B41C1ZZ FLUOROSCOPY OF PELVIC ARTERIES USING LOW OSMOLAR CONTRAST: ICD-10-PCS | Performed by: RADIOLOGY

## 2018-07-21 RX ORDER — MIDAZOLAM HYDROCHLORIDE 1 MG/ML
INJECTION, SOLUTION INTRAMUSCULAR; INTRAVENOUS
Status: DISCONTINUED | OUTPATIENT
Start: 2018-07-21 | End: 2018-07-23 | Stop reason: HOSPADM

## 2018-07-21 RX ORDER — LIDOCAINE HYDROCHLORIDE 10 MG/ML
INJECTION, SOLUTION EPIDURAL; INFILTRATION; INTRACAUDAL; PERINEURAL
Status: DISCONTINUED | OUTPATIENT
Start: 2018-07-21 | End: 2018-07-23 | Stop reason: HOSPADM

## 2018-07-21 RX ORDER — OXYCODONE AND ACETAMINOPHEN 5; 325 MG/1; MG/1
1 TABLET ORAL EVERY 4 HOURS PRN
Status: DISCONTINUED | OUTPATIENT
Start: 2018-07-21 | End: 2018-07-23 | Stop reason: HOSPADM

## 2018-07-21 RX ORDER — SODIUM CHLORIDE 9 MG/ML
INJECTION, SOLUTION INTRAVENOUS ONCE
Status: COMPLETED | OUTPATIENT
Start: 2018-07-21 | End: 2018-07-21

## 2018-07-21 RX ORDER — HYDROCODONE BITARTRATE AND ACETAMINOPHEN 500; 5 MG/1; MG/1
TABLET ORAL
Status: DISCONTINUED | OUTPATIENT
Start: 2018-07-21 | End: 2018-07-23 | Stop reason: HOSPADM

## 2018-07-21 RX ORDER — CIPROFLOXACIN 2 MG/ML
INJECTION, SOLUTION INTRAVENOUS
Status: DISCONTINUED | OUTPATIENT
Start: 2018-07-21 | End: 2018-07-23 | Stop reason: HOSPADM

## 2018-07-21 RX ORDER — FENTANYL CITRATE 50 UG/ML
INJECTION, SOLUTION INTRAMUSCULAR; INTRAVENOUS
Status: DISCONTINUED | OUTPATIENT
Start: 2018-07-21 | End: 2018-07-23 | Stop reason: HOSPADM

## 2018-07-21 RX ORDER — OXYCODONE AND ACETAMINOPHEN 10; 325 MG/1; MG/1
1 TABLET ORAL EVERY 4 HOURS PRN
Status: DISCONTINUED | OUTPATIENT
Start: 2018-07-21 | End: 2018-07-23 | Stop reason: HOSPADM

## 2018-07-21 RX ORDER — DIPHENHYDRAMINE HCL 25 MG
25 CAPSULE ORAL EVERY 6 HOURS PRN
Status: DISCONTINUED | OUTPATIENT
Start: 2018-07-21 | End: 2018-07-23 | Stop reason: HOSPADM

## 2018-07-21 RX ORDER — HYDRALAZINE HYDROCHLORIDE 20 MG/ML
INJECTION INTRAMUSCULAR; INTRAVENOUS
Status: DISCONTINUED | OUTPATIENT
Start: 2018-07-21 | End: 2018-07-23 | Stop reason: HOSPADM

## 2018-07-21 RX ORDER — KETOROLAC TROMETHAMINE 30 MG/ML
INJECTION, SOLUTION INTRAMUSCULAR; INTRAVENOUS
Status: DISCONTINUED | OUTPATIENT
Start: 2018-07-21 | End: 2018-07-23 | Stop reason: HOSPADM

## 2018-07-21 RX ORDER — HYDROMORPHONE HYDROCHLORIDE 2 MG/ML
INJECTION, SOLUTION INTRAMUSCULAR; INTRAVENOUS; SUBCUTANEOUS
Status: DISCONTINUED | OUTPATIENT
Start: 2018-07-21 | End: 2018-07-23 | Stop reason: HOSPADM

## 2018-07-21 RX ORDER — HYDROMORPHONE HYDROCHLORIDE 2 MG/1
2 TABLET ORAL
Status: DISCONTINUED | OUTPATIENT
Start: 2018-07-22 | End: 2018-07-23 | Stop reason: HOSPADM

## 2018-07-21 RX ADMIN — HYDRALAZINE HYDROCHLORIDE 10 MG: 20 INJECTION INTRAMUSCULAR; INTRAVENOUS at 05:07

## 2018-07-21 RX ADMIN — HYDROMORPHONE HYDROCHLORIDE 1 MG: 2 INJECTION INTRAMUSCULAR; INTRAVENOUS; SUBCUTANEOUS at 06:07

## 2018-07-21 RX ADMIN — IOHEXOL 62 ML: 300 INJECTION, SOLUTION INTRAVENOUS at 05:07

## 2018-07-21 RX ADMIN — FENTANYL CITRATE 50 MCG: 50 INJECTION, SOLUTION INTRAMUSCULAR; INTRAVENOUS at 04:07

## 2018-07-21 RX ADMIN — HYDRALAZINE HYDROCHLORIDE 10 MG: 20 INJECTION INTRAMUSCULAR; INTRAVENOUS at 04:07

## 2018-07-21 RX ADMIN — TACROLIMUS 7 MG: 1 CAPSULE ORAL at 06:07

## 2018-07-21 RX ADMIN — HYDRALAZINE HYDROCHLORIDE 100 MG: 25 TABLET, FILM COATED ORAL at 04:07

## 2018-07-21 RX ADMIN — LISINOPRIL 40 MG: 20 TABLET ORAL at 09:07

## 2018-07-21 RX ADMIN — MYCOPHENOLATE MOFETIL 1000 MG: 250 CAPSULE ORAL at 09:07

## 2018-07-21 RX ADMIN — HYDRALAZINE HYDROCHLORIDE 100 MG: 25 TABLET, FILM COATED ORAL at 03:07

## 2018-07-21 RX ADMIN — MIDAZOLAM 1 MG: 1 INJECTION INTRAMUSCULAR; INTRAVENOUS at 04:07

## 2018-07-21 RX ADMIN — MYCOPHENOLATE MOFETIL 1000 MG: 250 CAPSULE ORAL at 08:07

## 2018-07-21 RX ADMIN — LIDOCAINE HYDROCHLORIDE 5 ML: 10 INJECTION, SOLUTION EPIDURAL; INFILTRATION; INTRACAUDAL; PERINEURAL at 05:07

## 2018-07-21 RX ADMIN — FENTANYL CITRATE 50 MCG: 50 INJECTION, SOLUTION INTRAMUSCULAR; INTRAVENOUS at 05:07

## 2018-07-21 RX ADMIN — TACROLIMUS 7 MG: 1 CAPSULE ORAL at 09:07

## 2018-07-21 RX ADMIN — MIDAZOLAM 1 MG: 1 INJECTION INTRAMUSCULAR; INTRAVENOUS at 05:07

## 2018-07-21 RX ADMIN — KETOROLAC TROMETHAMINE 30 MG: 30 INJECTION, SOLUTION INTRAMUSCULAR; INTRAVENOUS at 04:07

## 2018-07-21 RX ADMIN — DIPHENHYDRAMINE HYDROCHLORIDE 25 MG: 25 CAPSULE ORAL at 08:07

## 2018-07-21 RX ADMIN — NIFEDIPINE 60 MG: 30 TABLET, FILM COATED, EXTENDED RELEASE ORAL at 09:07

## 2018-07-21 RX ADMIN — LABETALOL HYDROCHLORIDE 200 MG: 200 TABLET, FILM COATED ORAL at 03:07

## 2018-07-21 RX ADMIN — ASPIRIN 81 MG CHEWABLE TABLET 81 MG: 81 TABLET CHEWABLE at 09:07

## 2018-07-21 RX ADMIN — PREDNISONE 1 MG: 1 TABLET ORAL at 09:07

## 2018-07-21 RX ADMIN — CIPROFLOXACIN 400 MG: 2 INJECTION, SOLUTION INTRAVENOUS at 04:07

## 2018-07-21 RX ADMIN — OXYCODONE HYDROCHLORIDE AND ACETAMINOPHEN 1 TABLET: 10; 325 TABLET ORAL at 08:07

## 2018-07-21 RX ADMIN — SODIUM CHLORIDE: 0.9 INJECTION, SOLUTION INTRAVENOUS at 01:07

## 2018-07-21 RX ADMIN — LABETALOL HYDROCHLORIDE 200 MG: 200 TABLET, FILM COATED ORAL at 09:07

## 2018-07-21 RX ADMIN — HYDRALAZINE HYDROCHLORIDE 100 MG: 25 TABLET, FILM COATED ORAL at 10:07

## 2018-07-21 NOTE — PROCEDURES
Radiology Post-Procedure Note    Pre Op Diagnosis: vaginal bleeding  Post Op Diagnosis: Same    Procedure: pelvic angio and embolization.    Procedure performed by: Aren Ramos MD    Written Informed Consent Obtained: Yes  Specimen Removed: NO  Estimated Blood Loss: Minimal    Findings:   Successful embolization of B anterior division of internal iliac arteries, including uterine arteries    Patient tolerated procedure well.    @SIG@

## 2018-07-21 NOTE — ED NOTES
Pt lying down, respirations even/unlabored. NAD noted. Pt denies any current needs at this time. Side rails up x2, call bell within reach will continue to monitor.

## 2018-07-21 NOTE — NURSING
Pt arrived to unit via stretcher, ambulated to bed unassisted. Denies pain, pt reports vaginal bleeding, vitals stable. Spoke with Dr. Lianna szymanski to hold 2nd transfusion for now.    no

## 2018-07-21 NOTE — CONSULTS
Consult Note  Nephrology    Consult Requested By: Estrella Guevara MD  Reason for Consult: ESRD    SUBJECTIVE:     History of Present Illness:  Patient is a 27 y.o. female presents with severe anemia due to vaginal bleeding which began after Gyn procedure 6/15 and has not stopped. She received 1 unit PRBCs overnight. Currently still with bleeding, vaginal packing in place. Denies CP, Palps, SOB, Nausea, Vomitting.  .    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 ()    Moderate protein-calorie malnutrition 2017    MRSA bacteremia 2017    Pneumonia     Prophylactic immunotherapy 2014    Renovascular hypertension 10/2/2015    Secondary hyperparathyroidism 2017    Seizures     Sialadenitis 3/21/2018    Thrombocytopenia 2016     Past Surgical History:   Procedure Laterality Date     SECTION      x 2    CONIZATION OF CERVIX USING LOOP ELECTROSURGICAL EXCISION PROCEDURE (LEEP) N/A 6/15/2018    Procedure: CONIZATION-CERVICAL-LEEP;  Surgeon: Neelam Marroquin MD;  Location: [facility] OR;  Service: OB/GYN;  Laterality: N/A;    EXAMINATION UNDER ANESTHESIA N/A 2018    Procedure: Exam under anesthesia;  Surgeon: Neelam Marroquin MD;  Location: [facility] OR;  Service: OB/GYN;  Laterality: N/A;    EXAMINATION UNDER ANESTHESIA N/A 2018    Procedure: Exam under anesthesia (ADD ON );  Surgeon: NICKIE Killian  MD Kim;  Location: [facility];  Service: OB/GYN;  Laterality: N/A;  (ADD ON )    LIVER BIOPSY      LIVER TRANSPLANT  09/1992    PERINEORRHAPHY  6/19/2018    Procedure: SUTURE REPAIR,CERVIX;  Surgeon: Neelam Marroquin MD;  Location: [facility];  Service: OB/GYN;;    TUBAL LIGATION  2010     Family History   Problem Relation Age of Onset    Hypertension Mother     Hypertension Father     Melanoma Neg Hx     Breast cancer Neg Hx     Colon cancer Neg Hx     Ovarian cancer Neg Hx      Social History   Substance Use Topics    Smoking status: Never Smoker    Smokeless tobacco: Never Used    Alcohol use No       Prescriptions Prior to Admission   Medication Sig Dispense Refill Last Dose    aspirin 81 MG Chew Take 1 tablet (81 mg total) by mouth once daily.  0 7/19/2018    calcitRIOL (ROCALTROL) 0.25 MCG Cap Take 1 capsule (0.25 mcg total) by mouth every Mon, Wed, Fri. 12 capsule 0 7/19/2018    cinacalcet (SENSIPAR) 30 MG Tab Take 1 tablet (30 mg total) by mouth daily with breakfast. (Patient taking differently: Take 30 mg by mouth. On Tuesday, Thursday, and Saturday with dialysis) 30 tablet 11 7/19/2018    citalopram (CELEXA) 20 MG tablet TAKE 1 TABLET BY MOUTH EVERY DAY 30 tablet 1 7/19/2018    cloNIDine 0.3 mg/24 hr td ptwk (CATAPRES) 0.3 mg/24 hr Place 1 patch onto the skin every 7 days. 4 patch 11 7/19/2018    famotidine (PEPCID) 40 MG tablet Take 0.5 tablets (20 mg total) by mouth once daily. 15 tablet 11 7/19/2018    hydrALAZINE (APRESOLINE) 100 MG tablet Take 1 tablet (100 mg total) by mouth every 8 (eight) hours. 90 tablet 0 7/19/2018    labetalol (NORMODYNE) 200 MG tablet Take 2 tablets (400 mg total) by mouth every 8 (eight) hours. (Patient taking differently: Take 400 mg by mouth every 12 (twelve) hours. ) 360 tablet 11 7/19/2018    lisinopril (PRINIVIL,ZESTRIL) 40 MG tablet Take 1 tablet (40 mg total) by mouth once daily. 30 tablet 0 7/19/2018    mycophenolate (CELLCEPT) 250 mg Cap  Take 1,000 mg by mouth 2 (two) times daily.   7/19/2018    NIFEdipine (PROCARDIA-XL) 60 MG (OSM) 24 hr tablet Take 2 tablets (120 mg total) by mouth once daily. 60 tablet 11 7/19/2018    predniSONE (DELTASONE) 1 MG tablet Take 1 mg by mouth every other day.   7/19/2018    tacrolimus (PROGRAF) 1 MG Cap Take 7 capsules (7 mg total) by mouth every 12 (twelve) hours. 420 capsule 11 7/19/2018    triamcinolone acetonide 0.1% (KENALOG) 0.1 % ointment AAA on arms, legs, and neck bid x 1-2 wks then prn flares only (Patient taking differently: Apply to affected area(s) on arms, legs, and neck twice daily x 1-2 wks then as needed for flares only) 80 g 3 7/19/2018    vitamin renal formula, B-complex-vitamin c-folic acid, (NEPHROCAP) 1 mg Cap Take 1 capsule by mouth once daily. 30 capsule 0 7/19/2018    acetaminophen-codeine 300-30mg (TYLENOL #3) 300-30 mg Tab Take 1 tablet by mouth every 6 (six) hours as needed. 10 tablet 0 Unknown    food supplemt, lactose-reduced (ENSURE ACTIVE HIGH PROTEIN) Liqd Take 236 mLs by mouth 2 (two) times daily. 60 Can 6 Unknown    loratadine (CLARITIN) 10 mg tablet Take 1 tablet (10 mg total) by mouth once daily. 30 tablet 0 not taking    ondansetron (ZOFRAN) 4 MG tablet Take 1 tablet (4 mg total) by mouth every 6 (six) hours as needed for Nausea. (Patient taking differently: Take 4 mg by mouth once daily. ) 15 tablet 0 Unknown       Review of patient's allergies indicates:   Allergen Reactions    Chloral hydrate      Other reaction(s): Hallucinations  Other reaction(s): Hives    Hydrocodone Other (See Comments)     Mental status changes        Review of Systems:  Constitutional: No fever or chills, no weight changes, weakness  Eyes: No visual changes or photophobia  HEENT: No nasal congestion or sore throat  Respiratory: No cough or shorness of breath  Cardiovascular: No chest pain or palpitations  Gastrointestinal: Good appetite, no nausea or vomiting, no change in bowel  habits  Genitourinary: No hematuria or dysuria, + vagnial bleeding  Skin: No rash or pruritis  Hematologic/lymphatic: No easy bruising, bleeding or lymphadenopathy  Musculoskeletal: No arthralgias or myalgias  Neurological: No seizures or tremors  Endocrine: No heat/cold intolerance.  No polyuria/polydipsia.  Psychiatric:  No depression or anxiety.     OBJECTIVE:     Vital Signs (Most Recent)  Temp: 97.4 °F (36.3 °C) (07/21/18 1315)  Pulse: 80 (07/21/18 1315)  Resp: 16 (07/21/18 1315)  BP: (!) 174/120 (07/21/18 1315)  SpO2: 99 % (07/21/18 0744)    Vital Signs Range (Last 24H):  Temp:  [96.9 °F (36.1 °C)-98.9 °F (37.2 °C)]   Pulse:  []   Resp:  [16-31]   BP: (135-194)/()   SpO2:  [99 %-100 %]     Physical Exam:    General appearance: Well developed, well nourished  Head: Normocephalic, atraumatic  Eyes:  Conjunctivae nl. Sclera anicteric. PERRL.  HEENT: Lips, mucosa, and tongue normal; teeth and gums normal and oropharynx clear.  Neck: Supple, trachea midline, thyroid not enlarged,   Lungs: Clear to auscultation bilaterally and normal respiratory effort  Heart: Regular rate and rhythm, S1, S2 normal, no murmur, click, rub or gallop  Abdomen: Soft, non-tender non-distended; bowel sounds normal; no masses,  no organomegaly  Extremities: No cyanosis or clubbing. No edema, Left forearm AVF with T/B  Pulses: 2+ and symmetric  Skin: Skin color, texture, turgor normal. No rashes or lesions  Neurologic: Normal strength and tone. No focal numbness or weakness  Psychiatric:  Alert and oriented times 3.  Affect appropriate.     Diagnostic Results:  Labs: Reviewed  X-Ray: Reviewed    ASSESSMENT/PLAN:     1. ESRD/HD(N 18.6, Z 99.2, I 12.0):   -[facility]  -HD today and will also give PRBCs    2. Acute on Chronic Anemia of ESRD(D 63.1, N93.9, D50):    -Pt received 1 unit PRBCs last pm and will give additional 2 today on HD.  -Will continue Epogen as outpt as well.  -Acute blood loss  from GYN source and defer to GYN team.    3. Chronic Thrombocytopenia (D69.6)  -Plts 55 today and she tells me that bleeding is continuing.  -Coags are normal.  -She may need platelet transfusion, would consult heme once.    4. S/P Liver Transplant.(Z 94.4):   -Continue usual transplant meds, but need to discuss appropriate prophylaxis with either GI or ID.    5.2HPT (N25.81)  -Continue Calcitriol and Sensipar as out pt.    6. HTN (I 12.0, Z 91.14):    -Continue usual home meds.    Addendum: Seen on HD and hemodynamically stable.

## 2018-07-21 NOTE — PROGRESS NOTES
Pt received 100 mg Hydralazine PO in the ED at 1800. Spoke with Dr. Ranger. Gallegos to give scheduled Hydralazine at 2200.

## 2018-07-21 NOTE — PROGRESS NOTES
Pt returned from procedure and right groin dressing dry and intact and no hematoma or bleeding noted.  + pulse noted to right warm foot.  Pt and family verbalizes understanding to lay flat and bedrest until 2300.

## 2018-07-21 NOTE — PROGRESS NOTES
Notified GYN resident regarding patient's blood pressure. Scheduled Labetalol given. Paged Dr. Contreras regarding patient as well. Orders given to administer scheduled Hydralazine now. Dr. Contreras will see pt this morning.       07/21/18 0350   Vital Signs   Pulse 85   BP (!) 193/123

## 2018-07-21 NOTE — PROGRESS NOTES
Progress Note  Gynecology    Admit Date: 2018  LOS: 0    Reason for Admission:  <principal problem not specified>    SUBJECTIVE:     Holly Patel is a 27 y.o.  who is admitted for anemia secondary to bleeding status post LEEP (see H&P for details).    Pt received one unit of blood overnight after having Monsel's solution and vaginal packing placed in ED. Pt tolerated transfusion well. BP well controlled until early this morning. Pt has been getting scheduled medications early for this reason. She has been asymptomatic. This morning, she states she continues to have mild bleeding through her packing -she has changed her pad twice with only minimal spotting noted. Denies weakness/dizziness, HA, vision changes, CP, SOB, N/V. She has tlerated a regular diet until midnight. Patient is anuric 2/2 to ESRD.     OBJECTIVE:     Vital Signs   Temp:  [97.8 °F (36.6 °C)-98.9 °F (37.2 °C)] 97.8 °F (36.6 °C)  Pulse:  [] 85  Resp:  [16-31] 16  SpO2:  [99 %-100 %] 99 %  BP: (165-197)/() 193/123      Intake/Output Summary (Last 24 hours) at 18 0526  Last data filed at 18   Gross per 24 hour   Intake           283.13 ml   Output                0 ml   Net           283.13 ml       Physical Exam:  Gen: A&Ox3, NAD  CV: RRR  Pulm: LCTAB, normal respiratory effort  Abd: normo active bowel sounds, soft, non-distended, non-tender to palpation without rebound or guarding  Ext: PPP, no peripheral edema, TEDs/SCDs in place  Vaginal packing in place, pad with spotting old blood noted    Laboratory:  Recent Results (from the past 24 hour(s))   CBC auto differential    Collection Time: 18 11:15 AM   Result Value Ref Range    WBC 4.07 3.90 - 12.70 K/uL    RBC 2.39 (L) 4.00 - 5.40 M/uL    Hemoglobin 6.7 (L) 12.0 - 16.0 g/dL    Hematocrit 20.3 (L) 37.0 - 48.5 %    MCV 85 82 - 98 fL    MCH 28.0 27.0 - 31.0 pg    MCHC 33.0 32.0 - 36.0 g/dL    RDW 15.9 (H) 11.5 - 14.5 %    Platelets 70 (L) 150 -  350 K/uL    MPV 10.1 9.2 - 12.9 fL    Gran # (ANC) 2.9 1.8 - 7.7 K/uL    Lymph # 0.6 (L) 1.0 - 4.8 K/uL    Mono # 0.2 (L) 0.3 - 1.0 K/uL    Eos # 0.4 0.0 - 0.5 K/uL    Baso # 0.01 0.00 - 0.20 K/uL    Gran% 71.3 38.0 - 73.0 %    Lymph% 13.5 (L) 18.0 - 48.0 %    Mono% 4.7 4.0 - 15.0 %    Eosinophil% 10.3 (H) 0.0 - 8.0 %    Basophil% 0.2 0.0 - 1.9 %    Differential Method Automated    Comprehensive metabolic panel    Collection Time: 07/20/18 11:16 AM   Result Value Ref Range    Sodium 140 136 - 145 mmol/L    Potassium 3.3 (L) 3.5 - 5.1 mmol/L    Chloride 102 95 - 110 mmol/L    CO2 30 (H) 23 - 29 mmol/L    Glucose 102 70 - 110 mg/dL    BUN, Bld 23 (H) 6 - 20 mg/dL    Creatinine 5.8 (H) 0.5 - 1.4 mg/dL    Calcium 7.8 (L) 8.7 - 10.5 mg/dL    Total Protein 6.6 6.0 - 8.4 g/dL    Albumin 2.5 (L) 3.5 - 5.2 g/dL    Total Bilirubin 0.6 0.1 - 1.0 mg/dL    Alkaline Phosphatase 529 (H) 55 - 135 U/L    AST 43 (H) 10 - 40 U/L    ALT 35 10 - 44 U/L    Anion Gap 8 8 - 16 mmol/L    eGFR if African American 11 (A) >60 mL/min/1.73 m^2    eGFR if non African American 9 (A) >60 mL/min/1.73 m^2   Protime-INR    Collection Time: 07/20/18 11:16 AM   Result Value Ref Range    Prothrombin Time 11.0 9.0 - 12.5 sec    INR 1.1 0.8 - 1.2   APTT    Collection Time: 07/20/18 11:16 AM   Result Value Ref Range    aPTT 27.1 21.0 - 32.0 sec   Prepare RBC 2 Units; active vaginal bleeding    Collection Time: 07/20/18 12:58 PM   Result Value Ref Range    UNIT NUMBER [id number]     PRODUCT CODE V2303N18     DISPENSE STATUS TRANSFUSED     CODING SYSTEM ICAY680     Unit Blood Type Code 7300     Unit Blood Type B POS     Unit Expiration [id number]     UNIT NUMBER [id number]     PRODUCT CODE X7479S67     DISPENSE STATUS CROSSMATCHED     CODING SYSTEM DGUE650     Unit Blood Type Code 7300     Unit Blood Type B POS     Unit Expiration [id number]    Type & Screen    Collection Time: 07/20/18  1:31 PM   Result Value Ref Range    Group & Rh B POS      Indirect Frederic NEG    Type & Screen    Collection Time: 18  5:45 PM   Result Value Ref Range    Group & Rh B POS     Indirect Frederic NEG    Prepare RBC    Collection Time: 18  5:45 PM   Result Value Ref Range    UNIT NUMBER [id number]     PRODUCT CODE H7720L94     DISPENSE STATUS CROSSMATCHED     CODING SYSTEM RVMR627     Unit Blood Type Code 5100     Unit Blood Type O POS     Unit Expiration [id number]     UNIT NUMBER [id number]     PRODUCT CODE W9048W43     DISPENSE STATUS CROSSMATCHED     CODING SYSTEM YSHP592     Unit Blood Type Code 5100     Unit Blood Type O POS     Unit Expiration [id number]        ASSESSMENT/PLAN:     Active Hospital Problems    Diagnosis  POA    Symptomatic anemia [D64.9]  Yes      Resolved Hospital Problems    Diagnosis Date Resolved POA   No resolved problems to display.     Holly Patel is a 27 y.o.  who presents with vaginal bleeding.      1. Postop bleeding after LEEP  - vaginal packed in the ED  - H/H  , baseline between -  - received 1uPRBC   - AM labs pending  - this is patient's 4th admission for same problem. Packing done overnight to tamponade bleeding. Do not believe OR indicated at this time as 2 previous OR cases have failed control of LEEP site bleeding.    - will discuss with staff and plan for permanent solution such as IR [place]     2. ABLA  - H/, close to patient's baseline   - T&S, s/p 1u pRBC  - AM labs pending     3. HTN  - discussed uncontrolled HTN as contributing cause to repetitive re-bleeds, strongly encouraged compliance with antihypertensive regimen  - continue all home antihypertensives while inpatient  - BP: (165-197)/() 193/123  - consider IM consult    4. H/o liver transplant  - continue home immunosuppression regimen     5. ESRD on HD  - receives HD //Sat  - no acute issues at this time  - consult placed to [facility], for dialysis today      Maddy Butler M.D.  PGY-4  ObGyn  [phone]    IR consulted for their evaluation and recommendations  I have reviewed the resident's note,  and agree with the diagnosis and management plan

## 2018-07-21 NOTE — H&P
H&P  Gynecology      SUBJECTIVE:       History of Present Illness:   Holly Patel is a 27 y.o.  with PMHx significant for liver transplant on chronic immunosuppression,poorly controlled HTN, and ESRD on HD who presents with postop vaginal bleeding that worsened this AM. Patient had a LEEP on 6/15/18 and has had intermittent issues with bleeding since then. She had EUA with oversewing of anterior edge of LEEP bed on . Since then she has been admitted for management of HTN urgency on , , and . During  admission, she had heavy bleeding which was managed with monsels and vaginal packing, as her BP was too high to safely operate. Patient was recently discharged on 7/10/18 after having surgery on 18 for again re bleeding of the LEEP site after a HTN episode. At that time patient had EUA with stay sutures placed and application of monsels and surgicel applied. She received a total of 2uPRBCs at that time.      Patient states that she has had vaginal bleeding since being discharged on 7/10/18. Bleeding typically worsens after hypertensive episodes. Called Dr. Marroquin's office on  with worsening vaginal bleeding and home BP of 218/140. Patient reports being compliant with her medication. Bleeding is constant, saturating 1-2 pads in 3 hours. Feels lightheaded and has a slight headache.      Denies vaginal discharge, itching, irritation, fever, chills, nausea, vomiting. Patient denies having any menstrual periods since December, likely due to poor state of health. Patient is anuric 2/2 to ESRD.     Review of patient's allergies indicates:   Allergen Reactions    Chloral hydrate      Other reaction(s): Hallucinations  Other reaction(s): Hives    Hydrocodone Other (See Comments)     Mental status changes       Past Medical History:   Diagnosis Date    Anemia in ESRD (end-stage renal disease) 10/12/2015    dialysis tues, thursday, sat; access left arm    Chronic rejection of liver  transplant 3/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 ()    Moderate protein-calorie malnutrition 2017    MRSA bacteremia 2017    Pneumonia     Prophylactic immunotherapy 2014    Renovascular hypertension 10/2/2015    Secondary hyperparathyroidism 2017    Seizures     Sialadenitis 3/21/2018    Thrombocytopenia 2016     Past Surgical History:   Procedure Laterality Date     SECTION      x 2    CONIZATION OF CERVIX USING LOOP ELECTROSURGICAL EXCISION PROCEDURE (LEEP) N/A 6/15/2018    Procedure: CONIZATION-CERVICAL-LEEP;  Surgeon: Neelam Marroquin MD;  Location: [facility];  Service: OB/GYN;  Laterality: N/A;    EXAMINATION UNDER ANESTHESIA N/A 2018    Procedure: Exam under anesthesia;  Surgeon: Neelam Marroquin MD;  Location: [facility];  Service: OB/GYN;  Laterality: N/A;    EXAMINATION UNDER ANESTHESIA N/A 2018    Procedure: Exam under anesthesia (ADD ON );  Surgeon: NICKIE Alvarez MD;  Location: [facility];  Service: OB/GYN;  Laterality: N/A;  (ADD ON )    LIVER BIOPSY      LIVER TRANSPLANT  1992    PERINEORRHAPHY  2018    Procedure: SUTURE REPAIR,CERVIX;  Surgeon: Neelam Marroquin MD;  Location: [facility];  Service: OB/GYN;;    TUBAL LIGATION       OB History      Para Term  AB Living    2 2 0 2 0 2    SAB TAB Ectopic Multiple Live Births    0 0 0   2        Family History   Problem Relation Age of Onset    Hypertension Mother     Hypertension Father     Melanoma Neg Hx     Breast cancer Neg Hx     Colon cancer Neg Hx      Ovarian cancer Neg Hx      Social History   Substance Use Topics    Smoking status: Never Smoker    Smokeless tobacco: Never Used    Alcohol use No       Current Facility-Administered Medications   Medication    0.9%  NaCl infusion (for blood administration)    [START ON 7/21/2018] aspirin chewable tablet 81 mg    hydrALAZINE tablet 100 mg    labetalol tablet 200 mg    [START ON 7/21/2018] lisinopril tablet 40 mg    [START ON 7/21/2018] NIFEdipine 24 hr tablet 60 mg    ondansetron disintegrating tablet 8 mg    promethazine (PHENERGAN) 12.5 mg in dextrose 5 % 50 mL IVPB    sodium chloride 0.9% flush 3 mL     Current Outpatient Prescriptions   Medication Sig    aspirin 81 MG Chew Take 1 tablet (81 mg total) by mouth once daily.    calcitRIOL (ROCALTROL) 0.25 MCG Cap Take 1 capsule (0.25 mcg total) by mouth every Mon, Wed, Fri.    cinacalcet (SENSIPAR) 30 MG Tab Take 1 tablet (30 mg total) by mouth daily with breakfast. (Patient taking differently: Take 30 mg by mouth. On Tuesday, Thursday, and Saturday with dialysis)    citalopram (CELEXA) 20 MG tablet TAKE 1 TABLET BY MOUTH EVERY DAY    cloNIDine 0.3 mg/24 hr td ptwk (CATAPRES) 0.3 mg/24 hr Place 1 patch onto the skin every 7 days.    famotidine (PEPCID) 40 MG tablet Take 0.5 tablets (20 mg total) by mouth once daily.    hydrALAZINE (APRESOLINE) 100 MG tablet Take 1 tablet (100 mg total) by mouth every 8 (eight) hours.    labetalol (NORMODYNE) 200 MG tablet Take 2 tablets (400 mg total) by mouth every 8 (eight) hours. (Patient taking differently: Take 400 mg by mouth every 12 (twelve) hours. )    lisinopril (PRINIVIL,ZESTRIL) 40 MG tablet Take 1 tablet (40 mg total) by mouth once daily.    mycophenolate (CELLCEPT) 250 mg Cap Take 1,000 mg by mouth 2 (two) times daily.    NIFEdipine (PROCARDIA-XL) 60 MG (OSM) 24 hr tablet Take 2 tablets (120 mg total) by mouth once daily.    predniSONE (DELTASONE) 1 MG tablet Take 1 mg by mouth every other day.     tacrolimus (PROGRAF) 1 MG Cap Take 7 capsules (7 mg total) by mouth every 12 (twelve) hours.    triamcinolone acetonide 0.1% (KENALOG) 0.1 % ointment AAA on arms, legs, and neck bid x 1-2 wks then prn flares only (Patient taking differently: Apply to affected area(s) on arms, legs, and neck twice daily x 1-2 wks then as needed for flares only)    vitamin renal formula, B-complex-vitamin c-folic acid, (NEPHROCAP) 1 mg Cap Take 1 capsule by mouth once daily.    acetaminophen-codeine 300-30mg (TYLENOL #3) 300-30 mg Tab Take 1 tablet by mouth every 6 (six) hours as needed.    food supplemt, lactose-reduced (ENSURE ACTIVE HIGH PROTEIN) Liqd Take 236 mLs by mouth 2 (two) times daily.    loratadine (CLARITIN) 10 mg tablet Take 1 tablet (10 mg total) by mouth once daily.    ondansetron (ZOFRAN) 4 MG tablet Take 1 tablet (4 mg total) by mouth every 6 (six) hours as needed for Nausea. (Patient taking differently: Take 4 mg by mouth once daily. )       Review of Systems:  Constitutional: no significant weight change, fever, fatigue  Eyes:  No vision changes  Cardiovascular: No chest pain  Respiratory: No shortness of breath or cough  Gastrointestinal: No diarrhea, bloody stool, nausea/vomiting, constipation  Genitourinary: anuric  Skin/Breast: No painful breasts, nipple discharge, masses  Neurological: positive for headache  Endocrine: No hot flushes  Psychiatric: No depression or anxiety     OBJECTIVE:     Vital Signs  Temp:  [98.2 °F (36.8 °C)-98.9 °F (37.2 °C)] 98.4 °F (36.9 °C)  Pulse:  [] 92  Resp:  [16-31] 24  SpO2:  [100 %] 100 %  BP: (168-197)/() 168/107    Physical Exam:  Gen: A&Ox3, NAD, thin, appears chronically ill  CV: RRR  Pulm: LCTAB  Abd: Soft, non-distended, non-tender to palpation without rebound or guarding  Ext: PPP, no peripheral edema  External genitalia: WNL  Urethra and Meatus: WNL  : Multiple clots removed from the vagina. Cervix visualized. Surgical suture noted at 3 and  9'oclock position with eschar. Bleeding coming from 4 o'clock position, posterior edge. Active bleeding noted. Monsel's applied. Krillex dipped in monsel's applied and vagina packed.     Laboratory  Recent Results (from the past 96 hour(s))   CBC auto differential    Collection Time: 07/20/18 11:15 AM   Result Value Ref Range    WBC 4.07 3.90 - 12.70 K/uL    RBC 2.39 (L) 4.00 - 5.40 M/uL    Hemoglobin 6.7 (L) 12.0 - 16.0 g/dL    Hematocrit 20.3 (L) 37.0 - 48.5 %    MCV 85 82 - 98 fL    MCH 28.0 27.0 - 31.0 pg    MCHC 33.0 32.0 - 36.0 g/dL    RDW 15.9 (H) 11.5 - 14.5 %    Platelets 70 (L) 150 - 350 K/uL    MPV 10.1 9.2 - 12.9 fL    Gran # (ANC) 2.9 1.8 - 7.7 K/uL    Lymph # 0.6 (L) 1.0 - 4.8 K/uL    Mono # 0.2 (L) 0.3 - 1.0 K/uL    Eos # 0.4 0.0 - 0.5 K/uL    Baso # 0.01 0.00 - 0.20 K/uL    Gran% 71.3 38.0 - 73.0 %    Lymph% 13.5 (L) 18.0 - 48.0 %    Mono% 4.7 4.0 - 15.0 %    Eosinophil% 10.3 (H) 0.0 - 8.0 %    Basophil% 0.2 0.0 - 1.9 %    Differential Method Automated    Comprehensive metabolic panel    Collection Time: 07/20/18 11:16 AM   Result Value Ref Range    Sodium 140 136 - 145 mmol/L    Potassium 3.3 (L) 3.5 - 5.1 mmol/L    Chloride 102 95 - 110 mmol/L    CO2 30 (H) 23 - 29 mmol/L    Glucose 102 70 - 110 mg/dL    BUN, Bld 23 (H) 6 - 20 mg/dL    Creatinine 5.8 (H) 0.5 - 1.4 mg/dL    Calcium 7.8 (L) 8.7 - 10.5 mg/dL    Total Protein 6.6 6.0 - 8.4 g/dL    Albumin 2.5 (L) 3.5 - 5.2 g/dL    Total Bilirubin 0.6 0.1 - 1.0 mg/dL    Alkaline Phosphatase 529 (H) 55 - 135 U/L    AST 43 (H) 10 - 40 U/L    ALT 35 10 - 44 U/L    Anion Gap 8 8 - 16 mmol/L    eGFR if African American 11 (A) >60 mL/min/1.73 m^2    eGFR if non African American 9 (A) >60 mL/min/1.73 m^2   Protime-INR    Collection Time: 07/20/18 11:16 AM   Result Value Ref Range    Prothrombin Time 11.0 9.0 - 12.5 sec    INR 1.1 0.8 - 1.2   APTT    Collection Time: 07/20/18 11:16 AM   Result Value Ref Range    aPTT 27.1 21.0 - 32.0 sec   Prepare RBC 2 Units;  active vaginal bleeding    Collection Time: 18 12:58 PM   Result Value Ref Range    UNIT NUMBER [id number]     PRODUCT CODE X6056Z69     DISPENSE STATUS ISSUED     CODING SYSTEM HBDF237     Unit Blood Type Code 7300     Unit Blood Type B POS     Unit Expiration [id number]     UNIT NUMBER [id number]     PRODUCT CODE V4214T17     DISPENSE STATUS CROSSMATCHED     CODING SYSTEM PGYV753     Unit Blood Type Code 7300     Unit Blood Type B POS     Unit Expiration [id number]    Type & Screen    Collection Time: 18  1:31 PM   Result Value Ref Range    Group & Rh B POS     Indirect Frederic NEG    Type & Screen    Collection Time: 18  5:45 PM   Result Value Ref Range    Group & Rh B POS     Indirect Frederic NEG    Prepare RBC    Collection Time: 18  5:45 PM   Result Value Ref Range    UNIT NUMBER [id number]     PRODUCT CODE N0568Q50     DISPENSE STATUS CROSSMATCHED     CODING SYSTEM SBPV430     Unit Blood Type Code 5100     Unit Blood Type O POS     Unit Expiration [id number]     UNIT NUMBER [id number]     PRODUCT CODE S7348Y72     DISPENSE STATUS CROSSMATCHED     CODING SYSTEM AVQD858     Unit Blood Type Code 5100     Unit Blood Type O POS     Unit Expiration [id number]        ASSESSMENT/PLAN:     Active Hospital Problems    Diagnosis  POA    Symptomatic anemia [D64.9]  Yes      Resolved Hospital Problems    Diagnosis Date Resolved POA   No resolved problems to display.       Holly Patel is a 27 y.o.  who presents with vaginal bleeding.     1. Postop bleeding after LEEP  - active bleeding noted on exam as above  - packed in the ED  - H/H  , baseline between --  - receiving 1uPRBC currently in ED  - this is patient's 4th admission for same problem. Packing done overnight to tamponade bleeding. Do not believe OR indicated at this time as 2 previous OR cases have failed control of LEEP site bleeding.    - admit to GYN service  - will discuss with staff and  plan for permanent solution.      2. ABLA  - H/H6/27, close to patient's baseline   - T&S, currently transfusing 1uPRBC, will follow up CBC in AM      3. HTN  - discussed uncontrolled HTN as contributing cause to repetitive re-bleeds, strongly encouraged compliance with antihypertensive regimen  - continue all home antihypertensives while inpatient  - BP: (165-197)/() 165/93       4. H/o liver transplant  - continue home immunosuppression regimen     5. ESRD on HD  - receives HD Tu/Th/Sat  - no acute issues at this time  - can resume usual schedule upon discharge.   - consult placed to [facility] nephrology, for dialysis tomorrow     D/w GYN staff    Magalys ANDRES  PGY2

## 2018-07-21 NOTE — PLAN OF CARE
Problem: Patient Care Overview  Goal: Individualization & Mutuality  Outcome: Ongoing (interventions implemented as appropriate)  Bed in low and locked position and able to reposition and amb per self.  Remains free of injury during shift.  Received 2 units PRBCs in HD today.  Bp remains bartolo and md aware.  Will continue to monitor.  Going for IR procedure at this time.

## 2018-07-21 NOTE — PLAN OF CARE
Problem: Fall Risk (Adult)  Goal: Absence of Falls  Patient will demonstrate the desired outcomes by discharge/transition of care.   Outcome: Ongoing (interventions implemented as appropriate)  Pt ambulates unassisted    Problem: Patient Care Overview  Goal: Plan of Care Review  Outcome: Ongoing (interventions implemented as appropriate)  Pt's pad count is 2 at this time, moderate amount of blood noted. Pt is hypertensive, see MAR for meds given. MDs are aware. Plan of care reviewed with pt, pt verbalizes understanding. Will continue to monitor.

## 2018-07-21 NOTE — CONSULTS
Received consult and EPIC perused.  I have placed HD orders and will see patient and place full consult later this morning. Given continued bleeding and hgb less than 7 this morning, rec transfusion of 2 units PRBC on dialysis and will UF volume.

## 2018-07-21 NOTE — NURSING
Post embolization, pt with complaint of 10/10 generalized pain. Pt drifts off to sleep when not aroused. Sheath out at 1705 and pressure held for 15 minutes. DP pulses easily palpable. Will continue to monitor post recovery.

## 2018-07-21 NOTE — NURSING
1800 pt still states pain is 10/10. Dilaudid 1 mg ivp given.     1815 pt asleep. [facility] for transport. VSS. Pt transported back to floor per stretcher. Report was phoned to floor prior to transfer.

## 2018-07-22 LAB
BASOPHILS # BLD AUTO: 0.02 K/UL
BASOPHILS NFR BLD: 0.2 %
BLD PROD TYP BPU: NORMAL
BLD PROD TYP BPU: NORMAL
BLOOD UNIT EXPIRATION DATE: NORMAL
BLOOD UNIT EXPIRATION DATE: NORMAL
BLOOD UNIT TYPE CODE: 7300
BLOOD UNIT TYPE CODE: 7300
BLOOD UNIT TYPE: NORMAL
BLOOD UNIT TYPE: NORMAL
CODING SYSTEM: NORMAL
CODING SYSTEM: NORMAL
DIFFERENTIAL METHOD: ABNORMAL
DISPENSE STATUS: NORMAL
DISPENSE STATUS: NORMAL
EOSINOPHIL # BLD AUTO: 0.7 K/UL
EOSINOPHIL NFR BLD: 8.1 %
ERYTHROCYTE [DISTWIDTH] IN BLOOD BY AUTOMATED COUNT: 16 %
HCT VFR BLD AUTO: 29.7 %
HGB BLD-MCNC: 9.8 G/DL
LYMPHOCYTES # BLD AUTO: 0.7 K/UL
LYMPHOCYTES NFR BLD: 8.2 %
MCH RBC QN AUTO: 27.6 PG
MCHC RBC AUTO-ENTMCNC: 33 G/DL
MCV RBC AUTO: 84 FL
MONOCYTES # BLD AUTO: 0.4 K/UL
MONOCYTES NFR BLD: 4.4 %
NEUTROPHILS # BLD AUTO: 7 K/UL
NEUTROPHILS NFR BLD: 79.1 %
PLATELET # BLD AUTO: 74 K/UL
PLATELET BLD QL SMEAR: ABNORMAL
PMV BLD AUTO: 10.5 FL
RBC # BLD AUTO: 3.55 M/UL
TRANS ERYTHROCYTES VOL PATIENT: NORMAL ML
TRANS ERYTHROCYTES VOL PATIENT: NORMAL ML
WBC # BLD AUTO: 8.81 K/UL

## 2018-07-22 PROCEDURE — 25000003 PHARM REV CODE 250: Performed by: STUDENT IN AN ORGANIZED HEALTH CARE EDUCATION/TRAINING PROGRAM

## 2018-07-22 PROCEDURE — 99900035 HC TECH TIME PER 15 MIN (STAT)

## 2018-07-22 PROCEDURE — 11000001 HC ACUTE MED/SURG PRIVATE ROOM

## 2018-07-22 PROCEDURE — 99233 SBSQ HOSP IP/OBS HIGH 50: CPT | Mod: GC,,, | Performed by: OBSTETRICS & GYNECOLOGY

## 2018-07-22 PROCEDURE — 36415 COLL VENOUS BLD VENIPUNCTURE: CPT

## 2018-07-22 PROCEDURE — 94761 N-INVAS EAR/PLS OXIMETRY MLT: CPT

## 2018-07-22 PROCEDURE — 85025 COMPLETE CBC W/AUTO DIFF WBC: CPT

## 2018-07-22 PROCEDURE — 63600175 PHARM REV CODE 636 W HCPCS: Performed by: STUDENT IN AN ORGANIZED HEALTH CARE EDUCATION/TRAINING PROGRAM

## 2018-07-22 RX ADMIN — OXYCODONE AND ACETAMINOPHEN 1 TABLET: 5; 325 TABLET ORAL at 12:07

## 2018-07-22 RX ADMIN — HYDRALAZINE HYDROCHLORIDE 100 MG: 25 TABLET, FILM COATED ORAL at 05:07

## 2018-07-22 RX ADMIN — HYDRALAZINE HYDROCHLORIDE 100 MG: 25 TABLET, FILM COATED ORAL at 09:07

## 2018-07-22 RX ADMIN — HYDRALAZINE HYDROCHLORIDE 100 MG: 25 TABLET, FILM COATED ORAL at 01:07

## 2018-07-22 RX ADMIN — ASPIRIN 81 MG CHEWABLE TABLET 81 MG: 81 TABLET CHEWABLE at 09:07

## 2018-07-22 RX ADMIN — OXYCODONE AND ACETAMINOPHEN 1 TABLET: 5; 325 TABLET ORAL at 05:07

## 2018-07-22 RX ADMIN — MYCOPHENOLATE MOFETIL 1000 MG: 250 CAPSULE ORAL at 09:07

## 2018-07-22 RX ADMIN — TACROLIMUS 7 MG: 1 CAPSULE ORAL at 09:07

## 2018-07-22 RX ADMIN — LABETALOL HYDROCHLORIDE 200 MG: 200 TABLET, FILM COATED ORAL at 09:07

## 2018-07-22 RX ADMIN — TACROLIMUS 7 MG: 1 CAPSULE ORAL at 05:07

## 2018-07-22 RX ADMIN — LISINOPRIL 40 MG: 20 TABLET ORAL at 09:07

## 2018-07-22 RX ADMIN — LABETALOL HYDROCHLORIDE 200 MG: 200 TABLET, FILM COATED ORAL at 01:07

## 2018-07-22 RX ADMIN — NIFEDIPINE 60 MG: 30 TABLET, FILM COATED, EXTENDED RELEASE ORAL at 09:07

## 2018-07-22 RX ADMIN — LABETALOL HYDROCHLORIDE 200 MG: 200 TABLET, FILM COATED ORAL at 05:07

## 2018-07-22 NOTE — PLAN OF CARE
Problem: Patient Care Overview  Goal: Plan of Care Review  Outcome: Ongoing (interventions implemented as appropriate)  Plan of care reviewed with Pt, purposeful hourly rounding performed. VSS on RA. R groin incision C/D/I. Pain moderately controlled with PRN medication. NAD during shift. No c/o at this time. Bed locked and lowered, call light in reach. Will continue to monitor.

## 2018-07-22 NOTE — PLAN OF CARE
LMSW met with patient at the bedside.    Patient is alsleep. Patients mother Myrna is at the bedside and completed assessment.     Patients PCP is correct on the face sheet. Patient choice pharmacy is [facility] in [place]        Patients family will transport her home pending discharge.    Patient readmitted for vaginal bleeding.     Assigned SW/CM team will continue to follow.      07/22/18 1501   Discharge Assessment   Assessment Type Discharge Planning Assessment   Confirmed/corrected address and phone number on facesheet? Yes   Assessment information obtained from? Caregiver   Communicated expected length of stay with patient/caregiver no   Prior to hospitilization cognitive status: Alert/Oriented   Prior to hospitalization functional status: Independent   Current cognitive status: Alert/Oriented   Current Functional Status: Independent   Lives With sibling(s);parent(s)   Able to Return to Prior Arrangements yes   Is patient able to care for self after discharge? Yes   Patient's perception of discharge disposition home or selfcare   Readmission Within The Last 30 Days previous discharge plan unsuccessful   Patient currently being followed by outpatient case management? No   Patient currently receives home health services? No   Patient currently receives any other outside agency services? No   Is it the patient/care giver preference to resume care with the current outside agency? No   Equipment Currently Used at Home none   Do you have any problems affording any of your prescribed medications? No   Is the patient taking medications as prescribed? yes   Does the patient have transportation home? Yes   Transportation Available family or friend will provide   Dialysis Name and Scheduled days [facility]    Does the patient receive services at the [facility]? No   Discharge Plan A Home;Home with family   Patient/Family In Agreement With Plan yes   Does the patient currently use HME? No   Does the patient receive  outpatient dialysis? Yes   Are there any open cases? No   Readmission Questionnaire   At the time of your discharge, did someone talk to you about what your health problems were? Yes   At the time of discharge, did someone talk to you about what to watch out for regarding worsening of your health problem? Yes   At the time of discharge, did someone talk to you about what to do if you experienced worsening of your health problem? Yes   At the time of discharge, did someone talk to you about which medication to take when you left the hospital and which ones to stop taking? Yes   At the time of discharge, did someone talk to you about when and where to follow up with a doctor after you left the hospital? Yes

## 2018-07-22 NOTE — PLAN OF CARE
Problem: Patient Care Overview  Goal: Plan of Care Review  Outcome: Ongoing (interventions implemented as appropriate)  100% saturation on nasal O2.

## 2018-07-22 NOTE — PROGRESS NOTES
Progress Note  Gynecology    Admit Date: 2018  LOS: 1    Reason for Admission:  Symptomatic anemia    SUBJECTIVE:     Holly Patel is a 27 y.o.  who is admitted for anemia secondary to bleeding status post LEEP (see H&P for details). POD #1 s/p uterine artery embolization.     Patient is doing well this morning. Complains of dark shadowing on pad overnight. Pain is well controlled. Tolerating regular diet.     OBJECTIVE:     Vital Signs   Temp:  [96.9 °F (36.1 °C)-98.5 °F (36.9 °C)] 98.5 °F (36.9 °C)  Pulse:  [80-96] 90  Resp:  [12-18] 18  SpO2:  [99 %-100 %] 99 %  BP: (135-207)/() 140/82      Intake/Output Summary (Last 24 hours) at 18 0608  Last data filed at 18 1345   Gross per 24 hour   Intake             1846 ml   Output             2300 ml   Net             -454 ml       Physical Exam:  Gen: A&Ox3, NAD  CV: RRR  Pulm: LCTAB, normal respiratory effort  Abd: normo active bowel sounds, soft, non-distended, non-tender to palpation without rebound or guarding  Ext: PPP, no peripheral edema, TEDs/SCDs in place  Vaginal packing removed. Dark, old blood with no bright red.     Laboratory:  No results found for this or any previous visit (from the past 24 hour(s)).    ASSESSMENT/PLAN:     Active Hospital Problems    Diagnosis  POA    *Symptomatic anemia [D64.9]  Yes      Resolved Hospital Problems    Diagnosis Date Resolved POA   No resolved problems to display.     Holly Patel is a 27 y.o.  who presents with vaginal bleeding.      1. Postop bleeding after LEEP  - vaginal packed in the ED  - H/H  >6.6/20.6.   - received 2 uPRBC on admission, and 2 further units in dialysis  - AM labs pending  - uterine artery embolization performed by IR on   - Will likely keep for monitoring of bleeding today, with plans for discharge on Monday.     2. ABLA  - T&S, s/p 4u pRBC  - AM labs pending     3. HTN  - discussed uncontrolled HTN as contributing cause to  repetitive re-bleeds, strongly encouraged compliance with antihypertensive regimen  - continue all home antihypertensives while inpatient  - BP: (135-207)/() 140/82  - Appreciate nephrology management of HTN    4. H/o liver transplant  - continue home immunosuppression regimen     5. ESRD on HD  - receives HD Tu/Th/Sat  - no acute issues at this time  - Appreciate nephrology management      Howard Decker MD  PGY-3 OB/GYN  [phone]

## 2018-07-22 NOTE — PROGRESS NOTES
Renal Progress Note    Admit Date: 7/20/2018   LOS: 1 day       SUBJECTIVE:     Patient is without complaint today.  Dialyzed without issue yesterday and received 2 unit blood transfusion.  Vaginal bleeding has decreased overnight.    Scheduled Meds:   aspirin  81 mg Oral Daily    cinacalcet  30 mg Oral Daily with breakfast    hydrALAZINE  100 mg Oral Q8H    labetalol  200 mg Oral Q8H    lisinopril  40 mg Oral Daily    mycophenolate  1,000 mg Oral BID    NIFEdipine  60 mg Oral Daily    predniSONE  1 mg Oral Daily    tacrolimus  7 mg Oral BID       OBJECTIVE:     Vital Signs Range (Last 24H):  Temp:  [97.5 °F (36.4 °C)-98.6 °F (37 °C)]   Pulse:  [82-98]   Resp:  [12-18]   BP: (140-207)/()   SpO2:  [97 %-100 %]     I & O (Last 24H):  Intake/Output Summary (Last 24 hours) at 07/22/18 1335  Last data filed at 07/21/18 1345   Gross per 24 hour   Intake             1523 ml   Output             2300 ml   Net             -777 ml       Physical Exam:  General appearance: Well developed, well nourished  Head: Normocephalic, atraumatic  Eyes:  Conjunctivae nl. Sclera anicteric. PERRL.  HEENT: Lips, mucosa, and tongue normal; teeth and gums normal and oropharynx clear.  Neck: Supple, trachea midline, thyroid not enlarged,   Lungs: Clear to auscultation bilaterally and normal respiratory effort  Heart: Regular rate and rhythm, S1, S2 normal, no murmur, click, rub or gallop  Abdomen: Soft, non-tender non-distended; bowel sounds normal; no masses,  no organomegaly  Extremities: No cyanosis or clubbing. No edema, Left forearm AVF with T/B  Pulses: 2+ and symmetric  Skin: Skin color, texture, turgor normal. No rashes or lesions  Neurologic: Normal strength and tone. No focal numbness or weakness  Psychiatric:  Alert and oriented times 3.  Affect appropriate.       Laboratory:  CBC:   Recent Labs  Lab 07/22/18  0527   WBC 8.81   RBC 3.55*   HGB 9.8*   HCT 29.7*   PLT 74*   MCV 84   MCH 27.6   MCHC 33.0     BMP:    Recent Labs  Lab 07/21/18  0526         K 4.4      CO2 25   BUN 32*   CREATININE 7.0*   CALCIUM 8.2*       ASSESSMENT/PLAN:   1. ESRD/HD(N 18.6, Z 99.2, I 12.0):   -Usual day T/T/S at [facility] runs 3.5 hrs  -HD Saturday without issue, transfused 2 units.  -Will not plan to HD tomorrow unless she needs more blood products.     2. Acute on Chronic Anemia of ESRD(D 63.1, N93.9, D50):    -Pt received 1 unit PRBCs 7/20 and additional 2 yesterday.  -Will continue Epogen as outpt as well.  -Acute blood loss from GYN source and defer to GYN team.     3. Chronic Thrombocytopenia (D69.6)  -Plts improved overnight to 74.  -Coags are normal.     4. S/P Liver Transplant.(Z 94.4):   -Continue usual transplant meds, but need to discuss appropriate prophylaxis with either GI or ID.     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 HTN (I 12.0, Z 91.14):    -Continue usual home meds.

## 2018-07-23 VITALS
WEIGHT: 114.63 LBS | OXYGEN SATURATION: 96 % | RESPIRATION RATE: 18 BRPM | SYSTOLIC BLOOD PRESSURE: 149 MMHG | TEMPERATURE: 99 F | HEART RATE: 98 BPM | DIASTOLIC BLOOD PRESSURE: 85 MMHG | BODY MASS INDEX: 19.1 KG/M2 | HEIGHT: 65 IN

## 2018-07-23 LAB
BASOPHILS # BLD AUTO: 0.01 K/UL
BASOPHILS NFR BLD: 0.1 %
DIFFERENTIAL METHOD: ABNORMAL
EOSINOPHIL # BLD AUTO: 0.4 K/UL
EOSINOPHIL NFR BLD: 4.7 %
ERYTHROCYTE [DISTWIDTH] IN BLOOD BY AUTOMATED COUNT: 16.4 %
HCT VFR BLD AUTO: 29.3 %
HGB BLD-MCNC: 9.6 G/DL
LYMPHOCYTES # BLD AUTO: 0.7 K/UL
LYMPHOCYTES NFR BLD: 7.8 %
MCH RBC QN AUTO: 27.5 PG
MCHC RBC AUTO-ENTMCNC: 32.8 G/DL
MCV RBC AUTO: 84 FL
MONOCYTES # BLD AUTO: 0.4 K/UL
MONOCYTES NFR BLD: 4.7 %
NEUTROPHILS # BLD AUTO: 7.8 K/UL
NEUTROPHILS NFR BLD: 82.5 %
PLATELET # BLD AUTO: 80 K/UL
PMV BLD AUTO: 9.7 FL
RBC # BLD AUTO: 3.49 M/UL
WBC # BLD AUTO: 9.45 K/UL

## 2018-07-23 PROCEDURE — 25000003 PHARM REV CODE 250: Performed by: STUDENT IN AN ORGANIZED HEALTH CARE EDUCATION/TRAINING PROGRAM

## 2018-07-23 PROCEDURE — 63600175 PHARM REV CODE 636 W HCPCS: Performed by: STUDENT IN AN ORGANIZED HEALTH CARE EDUCATION/TRAINING PROGRAM

## 2018-07-23 PROCEDURE — 94761 N-INVAS EAR/PLS OXIMETRY MLT: CPT

## 2018-07-23 PROCEDURE — 99238 HOSP IP/OBS DSCHRG MGMT 30/<: CPT | Mod: GC,,, | Performed by: OBSTETRICS & GYNECOLOGY

## 2018-07-23 PROCEDURE — 85025 COMPLETE CBC W/AUTO DIFF WBC: CPT

## 2018-07-23 PROCEDURE — 36415 COLL VENOUS BLD VENIPUNCTURE: CPT

## 2018-07-23 RX ADMIN — OXYCODONE HYDROCHLORIDE AND ACETAMINOPHEN 1 TABLET: 10; 325 TABLET ORAL at 11:07

## 2018-07-23 RX ADMIN — LABETALOL HYDROCHLORIDE 200 MG: 200 TABLET, FILM COATED ORAL at 05:07

## 2018-07-23 RX ADMIN — NIFEDIPINE 60 MG: 30 TABLET, FILM COATED, EXTENDED RELEASE ORAL at 08:07

## 2018-07-23 RX ADMIN — PREDNISONE 1 MG: 1 TABLET ORAL at 08:07

## 2018-07-23 RX ADMIN — TACROLIMUS 7 MG: 1 CAPSULE ORAL at 08:07

## 2018-07-23 RX ADMIN — HYDRALAZINE HYDROCHLORIDE 100 MG: 25 TABLET, FILM COATED ORAL at 05:07

## 2018-07-23 RX ADMIN — LISINOPRIL 40 MG: 20 TABLET ORAL at 08:07

## 2018-07-23 RX ADMIN — ASPIRIN 81 MG CHEWABLE TABLET 81 MG: 81 TABLET CHEWABLE at 08:07

## 2018-07-23 RX ADMIN — MYCOPHENOLATE MOFETIL 1000 MG: 250 CAPSULE ORAL at 08:07

## 2018-07-23 NOTE — PROGRESS NOTES
Progress Note  Gynecology    Admit Date: 2018  LOS: 2    Reason for Admission:  Symptomatic anemia    SUBJECTIVE:     Holly Patel is a 27 y.o.  who is admitted for anemia secondary to bleeding status post LEEP (see H&P for details). POD #2 s/p uterine artery embolization.     Patient is doing well this morning. States she noticed mild dark brown spotting overnight. Pain is well controlled. Only drinking clear liquids because she says she has no appetite in the hospital. States she will try a regular diet today. Endorses flatus and BM. Denies n/v, fever, chills, light-headedness, CP, and SOB.     OBJECTIVE:     Vital Signs   Temp:  [98.2 °F (36.8 °C)-99 °F (37.2 °C)] 98.4 °F (36.9 °C)  Pulse:  [90-98] 95  Resp:  [16-18] 16  SpO2:  [97 %-100 %] 98 %  BP: (132-154)/(72-97) 137/81    No intake or output data in the 24 hours ending 18 0613    Physical Exam:  Gen: A&Ox3, NAD  CV: RRR  Pulm: LCTAB, normal respiratory effort  Abd: normo active bowel sounds, soft, mildly distended, non-tender to palpation without rebound or guarding  Ext: PPP, no peripheral edema  Very small amount of black spotting on pad. Patient changed pad this am a few hours before I examined her.      Laboratory:    Recent Labs  Lab 18  0526 18  0527 18  0438   WBC 4.33 8.81 9.45   HGB 6.6* 9.8* 9.6*   HCT 20.6* 29.7* 29.3*   MCV 84 84 84   PLT 55* 74* 80*          ASSESSMENT/PLAN:     Active Hospital Problems    Diagnosis  POA    *Symptomatic anemia [D64.9]  Yes      Resolved Hospital Problems    Diagnosis Date Resolved POA   No resolved problems to display.     Holly Patel is a 27 y.o.  POD#2 s/p UAE due to vaginal bleeding from LEEP site secondary to HTN. Patient is stable and doing well.       1. Postop bleeding after LEEP  - UAE performed by IR on   - H/H  6.6/20.6 on admission, 9.6/29.3 today  - received 4u PRBCs since admission, last 2u given on   - Bleeding minimal over  past 24 hrs, likely stable for discharge home later today     2. ABLA  - T&S, s/p 4u pRBC  - CBC stable as of 7/22     3. HTN  - discussed uncontrolled HTN as contributing cause of repetitive re-bleeds, strongly encouraged compliance with antihypertensive regimen  - continue all home antihypertensives while inpatient  - BP: (132-154)/(72-97) over last 24h, now 137/81  - Appreciate nephrology management of HTN    4. H/o liver transplant  - continue home immunosuppression regimen     5. ESRD on HD  - receives HD Tu/Th/Sat and is on schedule  - no acute issues at this time  - Appreciate nephrology management      Trena Samano MD  OBGYN, PGY-1    Plan discussed with upper level resident who was in agreement.

## 2018-07-23 NOTE — PROGRESS NOTES
Renal Progress Note    Admit Date: 7/20/2018   LOS: 2 days       SUBJECTIVE:     Uneventful night.  No further bleeding.  Ready to go home.      Scheduled Meds:   aspirin  81 mg Oral Daily    cinacalcet  30 mg Oral Daily with breakfast    hydrALAZINE  100 mg Oral Q8H    labetalol  200 mg Oral Q8H    lisinopril  40 mg Oral Daily    mycophenolate  1,000 mg Oral BID    NIFEdipine  60 mg Oral Daily    predniSONE  1 mg Oral Daily    tacrolimus  7 mg Oral BID       OBJECTIVE:     Vital Signs Range (Last 24H):  Temp:  [98.2 °F (36.8 °C)-99 °F (37.2 °C)]   Pulse:  [90-98]   Resp:  [16-18]   BP: (132-154)/(72-97)   SpO2:  [96 %-100 %]     I & O (Last 24H):No intake or output data in the 24 hours ending 07/23/18 0925    Physical Exam:  General appearance: Well developed, well nourished  Head: Normocephalic, atraumatic  Eyes:  Conjunctivae nl. Sclera anicteric. PERRL.  HEENT: Lips, mucosa, and tongue normal; teeth and gums normal and oropharynx clear.  Neck: Supple, trachea midline, thyroid not enlarged,   Lungs: Clear to auscultation bilaterally and normal respiratory effort  Heart: Regular rate and rhythm, S1, S2 normal, no murmur, click, rub or gallop  Abdomen: Soft, non-tender non-distended; bowel sounds normal; no masses,  no organomegaly  Extremities: No cyanosis or clubbing. No edema, Left forearm AVF with T/B  Pulses: 2+ and symmetric  Skin: Skin color, texture, turgor normal. No rashes or lesions  Neurologic: Normal strength and tone. No focal numbness or weakness  Psychiatric:  Alert and oriented times 3.  Affect appropriate.       Laboratory:  CBC:     Recent Labs  Lab 07/23/18  0438   WBC 9.45   RBC 3.49*   HGB 9.6*   HCT 29.3*   PLT 80*   MCV 84   MCH 27.5   MCHC 32.8     BMP:     Recent Labs  Lab 07/21/18  0526         K 4.4      CO2 25   BUN 32*   CREATININE 7.0*   CALCIUM 8.2*       ASSESSMENT/PLAN:   1. ESRD/HD(N 18.6, Z 99.2, I 12.0):   -Usual day T/T/S at [facility] diaz 3.5  hrs  -HD Saturday without issue, transfused 2 units.  -Renally dose meds, avoid nephrotoxins, and monitor I/O's closely.       2. Acute on Chronic Anemia of ESRD(D 63.1, N93.9, D50):    -Pt received 1 unit PRBCs 7/20 and additional 2 on Saturday.  -Will continue Epogen as outpt as well.  -Acute blood loss from GYN source and defer to GYN team.       3. Chronic Thrombocytopenia (D69.6)  -Coags are normal.     4. S/P Liver Transplant.(Z 94.4):   -Continue usual transplant meds, but need to discuss appropriate prophylaxis with either GI or ID.     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 HTN (I 12.0, Z 91.14):    -Continue usual home meds.    Ok to DC from Renal

## 2018-07-23 NOTE — PLAN OF CARE
Patient is discharged home with family.     No CM needs for discharge.     Patients mother will transport her home.      07/23/18 1137   Final Note   Assessment Type Final Discharge Note   Discharge Disposition Home   What phone number can be called within the next 1-3 days to see how you are doing after discharge? [phone]

## 2018-07-23 NOTE — PROGRESS NOTES
AVS reviewed with the patient and her mom at the bedside. Education provided on all newly prescribed medications, follow-up appointments, doctor recommendations. All questions answered. Peripheral IVs removed, catheters intact. Patient's mom to transport home. Wheelchair requested.

## 2018-07-23 NOTE — DISCHARGE SUMMARY
Discharge Summary  Gynecology      Admit Date: 2018    Discharge Date and Time: 2018     Attending Physician: Estrella Guevara MD    Principal Diagnoses: Symptomatic anemia    Active Hospital Problems    Diagnosis  POA    *Symptomatic anemia [D64.9]  Yes      Resolved Hospital Problems    Diagnosis Date Resolved POA   No resolved problems to display.       Procedures: Procedure(s) (LRB):  EMBOLIZATION, BLOOD VESSEL (N/A)    Discharged Condition: stable    Hospital Course:   Holly Patel is a 27 y.o.  female with PMHx significant for liver transplant on chronic immunosuppression, poorly controlled HTN, and ESRD on HD who presented on 2018 with postop vaginal bleeding. Patient had a LEEP on 6/15/18 and has had intermittent issues with bleeding since then. She had EUA with oversewing of anterior edge of LEEP bed on . Since then she has been admitted for management of HTN urgency on , , and . Patient was recently discharged on 7/10/18 after having surgery on 18 for re-bleeding of the LEEP site after a HTN episode. At that time patient had EUA with stay sutures placed and application of Monsels and surgicel applied. She received a total of 2u PRBCs at that time.    Patient admitted on 18 after presenting to the ED with light-headedness and vaginal bleeding. She reported constant vaginal bleeding since being discharged on 7/10/18. She described her bleeding as constant, saturating 1-2 pads in 3 hours. Patient received 1u PRBCs in the ED and was subsequently packed with surgicel and Monsels. She received an additional unit of PRBCs overnight. On , she continued to have mild bleeding through her packing and received 2 additional units of PRBCs while at dialysis. Uterine artery embolization was then performed by IR. The patient was kept overnight on  and  with no additional vaginal bleeding and was noted to have a stable CBC and stable vital signs. She  received a total of 4u PRBCs on this admission.     On day of discharge, patient was ambulating and tolerating a regular diet without difficulty. Pain was well controlled without pain meds. She was discharged home on POD#2 in stable condition with routine precautions and instructions to follow up closely with Dr. Marroquin in 2 weeks.     Consults: Nephrology, Interventional Radiology    Significant Diagnostic Studies:    Recent Labs  Lab 07/21/18  0526 07/22/18  0527 07/23/18  0438   WBC 4.33 8.81 9.45   HGB 6.6* 9.8* 9.6*   HCT 20.6* 29.7* 29.3*   MCV 84 84 84   PLT 55* 74* 80*        Disposition: Home or Self Care    Patient Instructions:   Current Discharge Medication List      CONTINUE these medications which have NOT CHANGED    Details   aspirin 81 MG Chew Take 1 tablet (81 mg total) by mouth once daily.  Refills: 0      calcitRIOL (ROCALTROL) 0.25 MCG Cap Take 1 capsule (0.25 mcg total) by mouth every Mon, Wed, Fri.  Qty: 12 capsule, Refills: 0      cinacalcet (SENSIPAR) 30 MG Tab Take 1 tablet (30 mg total) by mouth daily with breakfast.  Qty: 30 tablet, Refills: 11      citalopram (CELEXA) 20 MG tablet TAKE 1 TABLET BY MOUTH EVERY DAY  Qty: 30 tablet, Refills: 1      cloNIDine 0.3 mg/24 hr td ptwk (CATAPRES) 0.3 mg/24 hr Place 1 patch onto the skin every 7 days.  Qty: 4 patch, Refills: 11    Associated Diagnoses: Renovascular hypertension      famotidine (PEPCID) 40 MG tablet Take 0.5 tablets (20 mg total) by mouth once daily.  Qty: 15 tablet, Refills: 11    Associated Diagnoses: Hematemesis with nausea      hydrALAZINE (APRESOLINE) 100 MG tablet Take 1 tablet (100 mg total) by mouth every 8 (eight) hours.  Qty: 90 tablet, Refills: 0      labetalol (NORMODYNE) 200 MG tablet Take 2 tablets (400 mg total) by mouth every 8 (eight) hours.  Qty: 360 tablet, Refills: 11    Associated Diagnoses: Hypertensive emergency; Uncontrolled hypertension; Renovascular hypertension      lisinopril (PRINIVIL,ZESTRIL) 40 MG  tablet Take 1 tablet (40 mg total) by mouth once daily.  Qty: 30 tablet, Refills: 0      mycophenolate (CELLCEPT) 250 mg Cap Take 1,000 mg by mouth 2 (two) times daily.      NIFEdipine (PROCARDIA-XL) 60 MG (OSM) 24 hr tablet Take 2 tablets (120 mg total) by mouth once daily.  Qty: 60 tablet, Refills: 11      predniSONE (DELTASONE) 1 MG tablet Take 1 mg by mouth every other day.      tacrolimus (PROGRAF) 1 MG Cap Take 7 capsules (7 mg total) by mouth every 12 (twelve) hours.  Qty: 420 capsule, Refills: 11    Associated Diagnoses: Liver transplanted      triamcinolone acetonide 0.1% (KENALOG) 0.1 % ointment AAA on arms, legs, and neck bid x 1-2 wks then prn flares only  Qty: 80 g, Refills: 3    Associated Diagnoses: Atopic dermatitis      vitamin renal formula, B-complex-vitamin c-folic acid, (NEPHROCAP) 1 mg Cap Take 1 capsule by mouth once daily.  Qty: 30 capsule, Refills: 0      acetaminophen-codeine 300-30mg (TYLENOL #3) 300-30 mg Tab Take 1 tablet by mouth every 6 (six) hours as needed.  Qty: 10 tablet, Refills: 0      food supplemt, lactose-reduced (ENSURE ACTIVE HIGH PROTEIN) Liqd Take 236 mLs by mouth 2 (two) times daily.  Qty: 60 Can, Refills: 6    Associated Diagnoses: Liver replaced by transplant; ESRD on hemodialysis; Iron deficiency anemia secondary to inadequate dietary iron intake; Moderate protein-calorie malnutrition      loratadine (CLARITIN) 10 mg tablet Take 1 tablet (10 mg total) by mouth once daily.  Qty: 30 tablet, Refills: 0      ondansetron (ZOFRAN) 4 MG tablet Take 1 tablet (4 mg total) by mouth every 6 (six) hours as needed for Nausea.  Qty: 15 tablet, Refills: 0             Trena Samano MD  OBGYN, PGY-1

## 2018-07-23 NOTE — PLAN OF CARE
Problem: Patient Care Overview  Goal: Plan of Care Review  Outcome: Ongoing (interventions implemented as appropriate)  Plan of care reviewed with Pt, purposeful hourly rounding performed. VSS on RA. Pt had scant, dark vaginal bleeding this shift. NAD during shift. No c/o at this time. Bed locked and lowered, call light in reach. Mother at bedside. Will continue to monitor.

## 2018-07-26 ENCOUNTER — TELEPHONE (OUTPATIENT)
Dept: OBSTETRICS AND GYNECOLOGY | Facility: CLINIC | Age: 28
End: 2018-07-26

## 2018-07-26 ENCOUNTER — ANESTHESIA (OUTPATIENT)
Dept: SURGERY | Facility: OTHER | Age: 28
DRG: 907 | End: 2018-07-26
Payer: MEDICARE

## 2018-07-26 ENCOUNTER — HOSPITAL ENCOUNTER (INPATIENT)
Facility: OTHER | Age: 28
LOS: 3 days | Discharge: HOME | DRG: 907 | End: 2018-07-29
Attending: EMERGENCY MEDICINE | Admitting: OBSTETRICS & GYNECOLOGY
Payer: MEDICARE

## 2018-07-26 ENCOUNTER — ANESTHESIA EVENT (OUTPATIENT)
Dept: SURGERY | Facility: OTHER | Age: 28
DRG: 907 | End: 2018-07-26
Payer: MEDICARE

## 2018-07-26 DIAGNOSIS — N99.820 POSTOPERATIVE VAGINAL BLEEDING FOLLOWING GENITOURINARY PROCEDURE: Primary | ICD-10-CM

## 2018-07-26 DIAGNOSIS — N95.2 VAGINAL ATROPHY: ICD-10-CM

## 2018-07-26 DIAGNOSIS — N93.9 VAGINAL BLEEDING: ICD-10-CM

## 2018-07-26 LAB
ABO + RH BLD: NORMAL
ALBUMIN SERPL BCP-MCNC: 2.5 G/DL
ALP SERPL-CCNC: 568 U/L
ALT SERPL W/O P-5'-P-CCNC: 33 U/L
ANION GAP SERPL CALC-SCNC: 12 MMOL/L
ANISOCYTOSIS BLD QL SMEAR: SLIGHT
APTT BLDCRRT: 29.6 SEC
AST SERPL-CCNC: 36 U/L
BASOPHILS # BLD AUTO: 0.01 K/UL
BASOPHILS NFR BLD: 0.1 %
BILIRUB SERPL-MCNC: 0.7 MG/DL
BLD GP AB SCN CELLS X3 SERPL QL: NORMAL
BUN SERPL-MCNC: 29 MG/DL
CALCIUM SERPL-MCNC: 8.7 MG/DL
CHLORIDE SERPL-SCNC: 100 MMOL/L
CO2 SERPL-SCNC: 26 MMOL/L
CREAT SERPL-MCNC: 7.1 MG/DL
DIFFERENTIAL METHOD: ABNORMAL
EOSINOPHIL # BLD AUTO: 0.6 K/UL
EOSINOPHIL NFR BLD: 8.2 %
ERYTHROCYTE [DISTWIDTH] IN BLOOD BY AUTOMATED COUNT: 16.3 %
EST. GFR  (AFRICAN AMERICAN): 8 ML/MIN/1.73 M^2
EST. GFR  (NON AFRICAN AMERICAN): 7 ML/MIN/1.73 M^2
GIANT PLATELETS BLD QL SMEAR: PRESENT
GLUCOSE SERPL-MCNC: 124 MG/DL
HCT VFR BLD AUTO: 25.4 %
HGB BLD-MCNC: 8.3 G/DL
HYPOCHROMIA BLD QL SMEAR: ABNORMAL
INR PPP: 1
LYMPHOCYTES # BLD AUTO: 0.6 K/UL
LYMPHOCYTES NFR BLD: 7.9 %
MCH RBC QN AUTO: 27.6 PG
MCHC RBC AUTO-ENTMCNC: 32.7 G/DL
MCV RBC AUTO: 84 FL
MONOCYTES # BLD AUTO: 0.2 K/UL
MONOCYTES NFR BLD: 2.7 %
NEUTROPHILS # BLD AUTO: 5.6 K/UL
NEUTROPHILS NFR BLD: 81.1 %
PLATELET # BLD AUTO: 84 K/UL
PLATELET BLD QL SMEAR: ABNORMAL
PMV BLD AUTO: 10.5 FL
POCT GLUCOSE: 93 MG/DL (ref 70–110)
POTASSIUM SERPL-SCNC: 3.9 MMOL/L
PROT SERPL-MCNC: 7.3 G/DL
PROTHROMBIN TIME: 10.7 SEC
RBC # BLD AUTO: 3.01 M/UL
SODIUM SERPL-SCNC: 138 MMOL/L
WBC # BLD AUTO: 6.95 K/UL

## 2018-07-26 PROCEDURE — 57720 REVISION OF CERVIX: CPT | Mod: 79,,, | Performed by: OBSTETRICS & GYNECOLOGY

## 2018-07-26 PROCEDURE — 25000003 PHARM REV CODE 250: Performed by: STUDENT IN AN ORGANIZED HEALTH CARE EDUCATION/TRAINING PROGRAM

## 2018-07-26 PROCEDURE — 37000009 HC ANESTHESIA EA ADD 15 MINS: Performed by: OBSTETRICS & GYNECOLOGY

## 2018-07-26 PROCEDURE — 99284 EMERGENCY DEPT VISIT MOD MDM: CPT

## 2018-07-26 PROCEDURE — 25000003 PHARM REV CODE 250: Performed by: PHYSICIAN ASSISTANT

## 2018-07-26 PROCEDURE — 0UQC7ZZ REPAIR CERVIX, VIA NATURAL OR ARTIFICIAL OPENING: ICD-10-PCS | Performed by: OBSTETRICS & GYNECOLOGY

## 2018-07-26 PROCEDURE — 80053 COMPREHEN METABOLIC PANEL: CPT

## 2018-07-26 PROCEDURE — 11000001 HC ACUTE MED/SURG PRIVATE ROOM

## 2018-07-26 PROCEDURE — 85610 PROTHROMBIN TIME: CPT

## 2018-07-26 PROCEDURE — 36000705 HC OR TIME LEV I EA ADD 15 MIN: Performed by: OBSTETRICS & GYNECOLOGY

## 2018-07-26 PROCEDURE — 85025 COMPLETE CBC W/AUTO DIFF WBC: CPT

## 2018-07-26 PROCEDURE — 63600175 PHARM REV CODE 636 W HCPCS: Mod: JG | Performed by: OBSTETRICS & GYNECOLOGY

## 2018-07-26 PROCEDURE — 71000039 HC RECOVERY, EACH ADD'L HOUR: Performed by: OBSTETRICS & GYNECOLOGY

## 2018-07-26 PROCEDURE — 63600175 PHARM REV CODE 636 W HCPCS: Performed by: ANESTHESIOLOGY

## 2018-07-26 PROCEDURE — 99499 UNLISTED E&M SERVICE: CPT | Mod: ,,, | Performed by: OBSTETRICS & GYNECOLOGY

## 2018-07-26 PROCEDURE — 37000008 HC ANESTHESIA 1ST 15 MINUTES: Performed by: OBSTETRICS & GYNECOLOGY

## 2018-07-26 PROCEDURE — 94761 N-INVAS EAR/PLS OXIMETRY MLT: CPT

## 2018-07-26 PROCEDURE — 99900035 HC TECH TIME PER 15 MIN (STAT)

## 2018-07-26 PROCEDURE — 63600175 PHARM REV CODE 636 W HCPCS: Performed by: INTERNAL MEDICINE

## 2018-07-26 PROCEDURE — 25000003 PHARM REV CODE 250: Performed by: NURSE PRACTITIONER

## 2018-07-26 PROCEDURE — 85730 THROMBOPLASTIN TIME PARTIAL: CPT

## 2018-07-26 PROCEDURE — 80100016 HC MAINTENANCE HEMODIALYSIS

## 2018-07-26 PROCEDURE — 25000003 PHARM REV CODE 250: Performed by: NURSE ANESTHETIST, CERTIFIED REGISTERED

## 2018-07-26 PROCEDURE — 86850 RBC ANTIBODY SCREEN: CPT

## 2018-07-26 PROCEDURE — 71000033 HC RECOVERY, INTIAL HOUR: Performed by: OBSTETRICS & GYNECOLOGY

## 2018-07-26 PROCEDURE — 86920 COMPATIBILITY TEST SPIN: CPT

## 2018-07-26 PROCEDURE — 63600175 PHARM REV CODE 636 W HCPCS: Performed by: NURSE ANESTHETIST, CERTIFIED REGISTERED

## 2018-07-26 PROCEDURE — 36000704 HC OR TIME LEV I 1ST 15 MIN: Performed by: OBSTETRICS & GYNECOLOGY

## 2018-07-26 RX ORDER — SODIUM CHLORIDE 9 MG/ML
INJECTION, SOLUTION INTRAVENOUS ONCE
Status: COMPLETED | OUTPATIENT
Start: 2018-07-26 | End: 2018-07-26

## 2018-07-26 RX ORDER — METRONIDAZOLE 500 MG/100ML
INJECTION, SOLUTION INTRAVENOUS
Status: DISCONTINUED | OUTPATIENT
Start: 2018-07-26 | End: 2018-07-26

## 2018-07-26 RX ORDER — ONDANSETRON 8 MG/1
8 TABLET, ORALLY DISINTEGRATING ORAL EVERY 8 HOURS PRN
Status: CANCELLED | OUTPATIENT
Start: 2018-07-26

## 2018-07-26 RX ORDER — SODIUM CHLORIDE 9 MG/ML
INJECTION, SOLUTION INTRAVENOUS CONTINUOUS PRN
Status: DISCONTINUED | OUTPATIENT
Start: 2018-07-26 | End: 2018-07-26

## 2018-07-26 RX ORDER — NIFEDIPINE 30 MG/1
120 TABLET, EXTENDED RELEASE ORAL DAILY
Status: DISCONTINUED | OUTPATIENT
Start: 2018-07-26 | End: 2018-07-27

## 2018-07-26 RX ORDER — LABETALOL 200 MG/1
400 TABLET, FILM COATED ORAL EVERY 8 HOURS
Status: DISCONTINUED | OUTPATIENT
Start: 2018-07-26 | End: 2018-07-27

## 2018-07-26 RX ORDER — HYDROCODONE BITARTRATE AND ACETAMINOPHEN 5; 325 MG/1; MG/1
1 TABLET ORAL EVERY 4 HOURS PRN
Status: DISCONTINUED | OUTPATIENT
Start: 2018-07-26 | End: 2018-07-29 | Stop reason: HOSPADM

## 2018-07-26 RX ORDER — PROPOFOL 10 MG/ML
VIAL (ML) INTRAVENOUS
Status: DISCONTINUED | OUTPATIENT
Start: 2018-07-26 | End: 2018-07-26

## 2018-07-26 RX ORDER — GLYCOPYRROLATE 0.2 MG/ML
INJECTION INTRAMUSCULAR; INTRAVENOUS
Status: DISCONTINUED | OUTPATIENT
Start: 2018-07-26 | End: 2018-07-26

## 2018-07-26 RX ORDER — ONDANSETRON 8 MG/1
8 TABLET, ORALLY DISINTEGRATING ORAL EVERY 8 HOURS PRN
Status: DISCONTINUED | OUTPATIENT
Start: 2018-07-26 | End: 2018-07-29 | Stop reason: HOSPADM

## 2018-07-26 RX ORDER — HYDROCODONE BITARTRATE AND ACETAMINOPHEN 5; 325 MG/1; MG/1
1 TABLET ORAL
Status: COMPLETED | OUTPATIENT
Start: 2018-07-26 | End: 2018-07-26

## 2018-07-26 RX ORDER — TACROLIMUS 1 MG/1
7 CAPSULE ORAL 2 TIMES DAILY
Status: CANCELLED | OUTPATIENT
Start: 2018-07-26

## 2018-07-26 RX ORDER — IBUPROFEN 600 MG/1
600 TABLET ORAL EVERY 6 HOURS PRN
Status: DISCONTINUED | OUTPATIENT
Start: 2018-07-26 | End: 2018-07-26

## 2018-07-26 RX ORDER — LABETALOL HYDROCHLORIDE 5 MG/ML
10 INJECTION, SOLUTION INTRAVENOUS ONCE
Status: DISCONTINUED | OUTPATIENT
Start: 2018-07-26 | End: 2018-07-26 | Stop reason: HOSPADM

## 2018-07-26 RX ORDER — FENTANYL CITRATE 50 UG/ML
25 INJECTION, SOLUTION INTRAMUSCULAR; INTRAVENOUS EVERY 5 MIN PRN
Status: COMPLETED | OUTPATIENT
Start: 2018-07-26 | End: 2018-07-26

## 2018-07-26 RX ORDER — CITALOPRAM 20 MG/1
20 TABLET, FILM COATED ORAL DAILY
Status: DISCONTINUED | OUTPATIENT
Start: 2018-07-26 | End: 2018-07-29 | Stop reason: HOSPADM

## 2018-07-26 RX ORDER — HYDRALAZINE HYDROCHLORIDE 20 MG/ML
20 INJECTION INTRAMUSCULAR; INTRAVENOUS EVERY 6 HOURS PRN
Status: DISCONTINUED | OUTPATIENT
Start: 2018-07-26 | End: 2018-07-29 | Stop reason: HOSPADM

## 2018-07-26 RX ORDER — LIDOCAINE HCL/PF 100 MG/5ML
SYRINGE (ML) INTRAVENOUS
Status: DISCONTINUED | OUTPATIENT
Start: 2018-07-26 | End: 2018-07-26

## 2018-07-26 RX ORDER — LISINOPRIL 20 MG/1
40 TABLET ORAL DAILY
Status: DISCONTINUED | OUTPATIENT
Start: 2018-07-27 | End: 2018-07-29 | Stop reason: HOSPADM

## 2018-07-26 RX ORDER — HYDROMORPHONE HYDROCHLORIDE 2 MG/1
2 TABLET ORAL
Status: DISCONTINUED | OUTPATIENT
Start: 2018-07-26 | End: 2018-07-29 | Stop reason: HOSPADM

## 2018-07-26 RX ORDER — DIPHENHYDRAMINE HYDROCHLORIDE 50 MG/ML
25 INJECTION INTRAMUSCULAR; INTRAVENOUS EVERY 6 HOURS PRN
Status: DISCONTINUED | OUTPATIENT
Start: 2018-07-26 | End: 2018-07-26 | Stop reason: HOSPADM

## 2018-07-26 RX ORDER — FENTANYL CITRATE 50 UG/ML
INJECTION, SOLUTION INTRAMUSCULAR; INTRAVENOUS
Status: DISCONTINUED | OUTPATIENT
Start: 2018-07-26 | End: 2018-07-26

## 2018-07-26 RX ORDER — SODIUM CHLORIDE 0.9 % (FLUSH) 0.9 %
3 SYRINGE (ML) INJECTION
Status: DISCONTINUED | OUTPATIENT
Start: 2018-07-26 | End: 2018-07-29 | Stop reason: HOSPADM

## 2018-07-26 RX ORDER — CLONIDINE 0.3 MG/24H
1 PATCH, EXTENDED RELEASE TRANSDERMAL
Status: DISCONTINUED | OUTPATIENT
Start: 2018-07-27 | End: 2018-07-29 | Stop reason: HOSPADM

## 2018-07-26 RX ORDER — MEPERIDINE HYDROCHLORIDE 50 MG/ML
12.5 INJECTION INTRAMUSCULAR; INTRAVENOUS; SUBCUTANEOUS ONCE AS NEEDED
Status: DISCONTINUED | OUTPATIENT
Start: 2018-07-26 | End: 2018-07-26 | Stop reason: HOSPADM

## 2018-07-26 RX ORDER — HYDROCODONE BITARTRATE AND ACETAMINOPHEN 10; 325 MG/1; MG/1
1 TABLET ORAL EVERY 4 HOURS PRN
Status: DISCONTINUED | OUTPATIENT
Start: 2018-07-26 | End: 2018-07-29 | Stop reason: HOSPADM

## 2018-07-26 RX ORDER — ONDANSETRON HYDROCHLORIDE 2 MG/ML
INJECTION, SOLUTION INTRAMUSCULAR; INTRAVENOUS
Status: DISCONTINUED | OUTPATIENT
Start: 2018-07-26 | End: 2018-07-26

## 2018-07-26 RX ORDER — ONDANSETRON 2 MG/ML
4 INJECTION INTRAMUSCULAR; INTRAVENOUS DAILY PRN
Status: DISCONTINUED | OUTPATIENT
Start: 2018-07-26 | End: 2018-07-26 | Stop reason: HOSPADM

## 2018-07-26 RX ORDER — HYDRALAZINE HYDROCHLORIDE 25 MG/1
100 TABLET, FILM COATED ORAL EVERY 8 HOURS
Status: DISCONTINUED | OUTPATIENT
Start: 2018-07-26 | End: 2018-07-29 | Stop reason: HOSPADM

## 2018-07-26 RX ORDER — OXYCODONE HYDROCHLORIDE 5 MG/1
5 TABLET ORAL
Status: DISCONTINUED | OUTPATIENT
Start: 2018-07-26 | End: 2018-07-26 | Stop reason: HOSPADM

## 2018-07-26 RX ADMIN — SODIUM CHLORIDE: 0.9 INJECTION, SOLUTION INTRAVENOUS at 05:07

## 2018-07-26 RX ADMIN — FENTANYL CITRATE 50 MCG: 50 INJECTION, SOLUTION INTRAMUSCULAR; INTRAVENOUS at 05:07

## 2018-07-26 RX ADMIN — FENTANYL CITRATE 50 MCG: 50 INJECTION, SOLUTION INTRAMUSCULAR; INTRAVENOUS at 06:07

## 2018-07-26 RX ADMIN — LABETALOL HYDROCHLORIDE 400 MG: 200 TABLET, FILM COATED ORAL at 02:07

## 2018-07-26 RX ADMIN — HYDRALAZINE HYDROCHLORIDE 20 MG: 20 INJECTION INTRAMUSCULAR; INTRAVENOUS at 07:07

## 2018-07-26 RX ADMIN — FENTANYL CITRATE 25 MCG: 50 INJECTION, SOLUTION INTRAMUSCULAR; INTRAVENOUS at 07:07

## 2018-07-26 RX ADMIN — LABETALOL HYDROCHLORIDE 400 MG: 200 TABLET, FILM COATED ORAL at 09:07

## 2018-07-26 RX ADMIN — NIFEDIPINE 120 MG: 30 TABLET, FILM COATED, EXTENDED RELEASE ORAL at 12:07

## 2018-07-26 RX ADMIN — GLYCOPYRROLATE 0.2 MG: 0.2 INJECTION, SOLUTION INTRAMUSCULAR; INTRAVENOUS at 05:07

## 2018-07-26 RX ADMIN — FENTANYL CITRATE 100 MCG: 50 INJECTION, SOLUTION INTRAMUSCULAR; INTRAVENOUS at 05:07

## 2018-07-26 RX ADMIN — CITALOPRAM HYDROBROMIDE 20 MG: 20 TABLET ORAL at 12:07

## 2018-07-26 RX ADMIN — ONDANSETRON 4 MG: 2 INJECTION, SOLUTION INTRAMUSCULAR; INTRAVENOUS at 06:07

## 2018-07-26 RX ADMIN — LIDOCAINE HYDROCHLORIDE 50 MG: 20 INJECTION, SOLUTION INTRAVENOUS at 05:07

## 2018-07-26 RX ADMIN — HYDROCODONE BITARTRATE AND ACETAMINOPHEN 1 TABLET: 5; 325 TABLET ORAL at 11:07

## 2018-07-26 RX ADMIN — PROPOFOL 20 MG: 10 INJECTION, EMULSION INTRAVENOUS at 05:07

## 2018-07-26 RX ADMIN — PROPOFOL 180 MG: 10 INJECTION, EMULSION INTRAVENOUS at 05:07

## 2018-07-26 NOTE — BRIEF OP NOTE
[facility]  Brief Operative Note    SUMMARY     Surgery Date: 7/26/2018     Surgeon(s) and Role:     * Lei Sims III, MD - Primary     * Roberta Ruiz MD - Resident - Assisting      Pre-op Diagnosis:  Postoperative vaginal bleeding following genitourinary procedure [N99.820]    Post-op Diagnosis:  Post-Op Diagnosis Codes:     * Postoperative vaginal bleeding following genitourinary procedure [N99.820]    Procedure(s) (LRB):  Exam under anesthesia -cervical suturing  (N/A)    Anesthesia: Choice    Description of the findings of the procedure:   - Foul smell and moderate bleeding on packing when removed  - Severely atrophic vagina with multiple abrasions present, some may be from packing in place during the day as atrophy so severe   - Cervix necrotic at LEEP bed, old sutures removed. Sturmdorf sutures placed anteriorly and posteriorly with good hemostasis.  - Recommend daily vaginal cleocin (unable to get from pharmacy at this time) and premarin cream    Estimated Blood Loss: 10 ml         Specimens:   Specimen (12h ago through future)    None        Rboerta Ruiz MD  OBGYN - PGY 4     I was present for and participated in the entire surgical procedure.

## 2018-07-26 NOTE — ED NOTES
Pt remains in dialysis. Report given ALFRED and informed pt will be transferred to room 308 after.

## 2018-07-26 NOTE — TRANSFER OF CARE
"Anesthesia Transfer of Care Note    Patient: Holly Patel    Procedure(s) Performed: Procedure(s) (LRB):  Exam under anesthesia -cervical suturing  (N/A)    Patient location: PACU    Anesthesia Type: general    Transport from OR: Transported from OR on room air with adequate spontaneous ventilation    Post pain: adequate analgesia    Post assessment: no apparent anesthetic complications    Post vital signs: stable    Level of consciousness: responds to stimulation    Nausea/Vomiting: no nausea/vomiting    Complications: none    Transfer of care protocol was followed      Last vitals:   Visit Vitals  BP (!) 166/102 (BP Location: Right arm, Patient Position: Lying)   Pulse 100   Temp 37.1 °C (98.8 °F) (Oral)   Resp 20   Ht 5' 5" (1.651 m)   Wt 52.6 kg (116 lb)   LMP  (LMP Unknown)   SpO2 100%   BMI 19.30 kg/m²     "

## 2018-07-26 NOTE — ED NOTES
Pt remains in ED. Per Catherine, dialysis RN WILVER Leroy requesting pt be given labetalol due at 1400. RNMARK pulled medication and delivered to dialysis nurse.

## 2018-07-26 NOTE — ANESTHESIA PREPROCEDURE EVALUATION
07/26/2018  Holly Patel is a 27 y.o., female.    Pre-op Assessment    I have reviewed the Patient Summary Reports.     I have reviewed the Nursing Notes.   I have reviewed the Medications.     Review of Systems  Anesthesia Hx:  No problems with previous Anesthesia  Denies Family Hx of Anesthesia complications.   Denies Personal Hx of Anesthesia complications.   Social:  Non-Smoker    Hematology/Oncology:     Oncology Normal    -- Anemia: Blood Loss Anemia Anemia of Chronic Kidney Disease Anemia of Chronic Disease  Acute, chronic and acute on chronic   -- Thrombocytopenia:    EENT/Dental:EENT/Dental Normal   Cardiovascular:   Exercise tolerance: good Hypertension, poorly controlled    Pulmonary:  Pulmonary Normal    Renal/:   Chronic Renal Disease    Hepatic/GI:   Liver Disease, Liver tx age 1   Musculoskeletal:  Musculoskeletal Normal    OB/GYN/PEDS:  Pt had recent embloization of uterine/iliac arteries. Presents again today for vaginal bleeding.   Neurological:   Seizures, well controlled    Endocrine:  Endocrine Normal    Dermatological:  Skin Normal    Psych:   depression          Physical Exam  General:  Well nourished    Airway/Jaw/Neck:  Airway Findings: Mouth Opening: Normal Tongue: Normal  General Airway Assessment: Adult  Mallampati: I  TM Distance: Normal, at least 6 cm  Jaw/Neck Findings:  Neck ROM: Normal ROM      Dental:  Dental Findings: In tactPoor looking dentition but denies any loose             Anesthesia Plan  Type of Anesthesia, risks & benefits discussed:  Anesthesia Type:  general  Patient's Preference:   Intra-op Monitoring Plan:   Intra-op Monitoring Plan Comments:   Post Op Pain Control Plan:   Post Op Pain Control Plan Comments:   Induction:   IV  Beta Blocker:         Informed Consent: Patient understands risks and agrees with Anesthesia plan.  Questions answered.  Anesthesia consent signed with patient.  ASA Score: 3  emergent   Day of Surgery Review of History & Physical:    H&P update referred to the surgeon.     Anesthesia Plan Notes: Plan to transfuse a unit prbcs        Ready For Surgery From Anesthesia Perspective.

## 2018-07-26 NOTE — ED NOTES
Pt rounding complete. Pt updated on POc and pt verbalized understanding.  Pt denies any pain or needs at the moment. Pt does not appear to be in acute distress. Respirations are even and unlabored.  Bed is locked and in lowest position with side rails up x2. Call light is within reach. Will continue to monitor. Family is at the bedside.

## 2018-07-26 NOTE — CONSULTS
Consult Note  Nephrology    Consult Requested By: Ac Bates II, MD  Reason for Consult: ESRD    SUBJECTIVE:     History of Present Illness:  Patient is a 27 y.o. female with ESRD on HD TTS, HTN, liver transplant recipient on chronic immunosuppression, medication nonadherence, re-admitted for post-op vaginal bleeding after LEEP.  She has been admitted several times over the last 6 weeks with vaginal bleeding and HTN urgency.  She has received multiple units of PRBCs.  UAE was performed by IR recently.  With regards to uncontrolled BPs, pt is simply put back on her home regimen and dialyzed with return of electrolytes and BPs to acceptable ranges.  She reports compliance with all medications, although her tacrolimus level has always been undetectable while in the hospital.  She reports eating fast food often.    Seen in HD with BPs in the 190s/100s.  She reports going to HD last on Tuesday but only 500ml removed as she was below her DW.      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)    Moderate protein-calorie malnutrition 8/16/2017    MRSA bacteremia 8/6/2017    Pneumonia     Prophylactic immunotherapy 8/4/2014    Renovascular hypertension 10/2/2015    Secondary hyperparathyroidism 8/5/2017    Seizures     Sialadenitis 3/21/2018    Thrombocytopenia 4/12/2016     Past Surgical History:   Procedure Laterality Date      SECTION      x 2    CONIZATION OF CERVIX USING LOOP ELECTROSURGICAL EXCISION PROCEDURE (LEEP) N/A 6/15/2018    Procedure: CONIZATION-CERVICAL-LEEP;  Surgeon: Neelam Marroquin MD;  Location: [facility] OR;  Service: OB/GYN;  Laterality: N/A;    EMBOLIZATION N/A 2018    Procedure: EMBOLIZATION, BLOOD VESSEL;  Surgeon: Aren Ramos MD;  Location: [facility] CATH LAB;  Service: Radiology;  Laterality: N/A;    EXAMINATION UNDER ANESTHESIA N/A 2018    Procedure: Exam under anesthesia;  Surgeon: Neelam Marroquin MD;  Location: [facility] OR;  Service: OB/GYN;  Laterality: N/A;    EXAMINATION UNDER ANESTHESIA N/A 2018    Procedure: Exam under anesthesia (ADD ON );  Surgeon: NICKIE Alvarez MD;  Location: [facility] OR;  Service: OB/GYN;  Laterality: N/A;  (ADD ON )    LIVER BIOPSY      LIVER TRANSPLANT  1992    PERINEORRHAPHY  2018    Procedure: SUTURE REPAIR,CERVIX;  Surgeon: Neelam Marroquin MD;  Location: [facility];  Service: OB/GYN;;    TUBAL LIGATION       Family History   Problem Relation Age of Onset    Hypertension Mother     Hypertension Father     Melanoma Neg Hx     Breast cancer Neg Hx     Colon cancer Neg Hx     Ovarian cancer Neg Hx      Social History   Substance Use Topics    Smoking status: Never Smoker    Smokeless tobacco: Never Used    Alcohol use No       Review of patient's allergies indicates:   Allergen Reactions    Chloral hydrate      Other reaction(s): Hallucinations  Other reaction(s): Hives    Hydrocodone Other (See Comments)     Mental status changes        Review of Systems:  Constitutional: no fever or chills  Eyes: no visual changes  ENT: no nasal congestion or sore throat  Respiratory: no cough or shortness of breath  Cardiovascular: no chest pain or palpitations  Gastrointestinal: no nausea or vomiting, no abdominal pain or change in bowel habits  Musculoskeletal: no arthralgias or myalgias  Neurological: no seizures or  tremors  Behavioral/Psych: no auditory or visual hallucinations  Endocrine: no heat or cold intolerance     OBJECTIVE:     Vital Signs (Most Recent)  Temp: 97.7 °F (36.5 °C) (07/26/18 0900)  Pulse: 94 (07/26/18 1230)  Resp: 18 (07/26/18 0900)  BP: (!) 171/111 (07/26/18 1230)  SpO2: 100 % (07/26/18 1230)    Vital Signs Range (Last 24H):  Temp:  [97.7 °F (36.5 °C)]   Pulse:  [89-94]   Resp:  [18]   BP: (171-193)/(111-128)   SpO2:  [93 %-100 %]     Physical Exam:  Gen: AAOx3, NAD  HEENT: mmm, sclera anicteric  CV: RRR, no m/r  Resp: CTAB, no rales or wheezes  GI: soft, ND, NTTP, +BS  Extr: no edema  Access: LUE AVF with palpable thrill     sodium chloride 0.9%   Intravenous Once    citalopram  20 mg Oral Daily    [START ON 7/27/2018] cloNIDine 0.3 mg/24 hr td ptwk  1 patch Transdermal Q7 Days    hydrALAZINE  100 mg Oral Q8H    labetalol  400 mg Oral Q8H    [START ON 7/27/2018] lisinopril  40 mg Oral Daily    NIFEdipine  120 mg Oral Daily       Laboratory:      Recent Labs  Lab 07/20/18  1116 07/21/18  0526 07/26/18  0944    138 138   K 3.3* 4.4 3.9    103 100   CO2 30* 25 26   BUN 23* 32* 29*   CREATININE 5.8* 7.0* 7.1*   CALCIUM 7.8* 8.2* 8.7       Recent Labs  Lab 07/22/18  0527 07/23/18  0438 07/26/18  0944   WBC 8.81 9.45 6.95   HGB 9.8* 9.6* 8.3*   HCT 29.7* 29.3* 25.4*   PLT 74* 80* 84*          Diagnostic Results:  Labs: Reviewed  X-Ray: Reviewed    ASSESSMENT/PLAN:     1. ESRD/HD(N 18.6, Z 99.2, I 12.0):   - Pt seen and examined on hemodialysis.  Labs noted and dialysate adjusted.  The dialysis flow sheet and vital signs were reviewed.  UF goal 2L.  - Discussed at length dialysis and medication adherence.  - She dialyzes at [facility] on the [facility] under the care of Dr. Bass.  I have left a voicemail for him to discuss pt's outpatient nephrology care.  -Renally dose meds, avoid nephrotoxins, and monitor I/O's closely.        2. Acute on Chronic Anemia of ESRD, acute blood loss anemia (D 63.1,  N93.9, D50):    - OK for PRBCs but will defer to Gyn.  -Will dose Procrit once BPs controlled.  -Acute blood loss from vaginal bleeding.        3. Chronic Thrombocytopenia (D69.6)  -Coags are normal.     4. S/P Liver Transplant.(Z 94.4):   - She will need to resume immunosuppression.  Will consult GI to manage this.     5.2HPT (N25.81)  -Continue Calcitriol and Sensipar     6. HTN (I 12.0, Z 91.14):    - Resume home meds- labetolol, clonidine patch, hydralazine, lisinopril, nifedipine.  - In prior admissions, once home BP meds resumed, her BPs have been acceptable range.    Dispo: She is notoriously noncompliant with medications and full dialysis schedule.  I have left a voicemail for her primary nephrologist Dr. Bass to see what her recent habits at the HD units have been.  Pt is minimally compliant with 3x/week HD schedule, so it unlikely that adding more time weekly treatments or increased time on HD will provide any meaningful benefit.  The root cause of her elevated BPs and thus vaginal bleeding, is her nonadherence to current medical regimen.    D/W GYN resident Dr. Ruiz.    Thank you for the consult.  We will continue to follow along with you.    Prosper Panda MD  Nephrology

## 2018-07-26 NOTE — ED NOTES
Spoke with Sunny from dialysis, she reported that she is going to get set up for the pt and will call when she is ready.

## 2018-07-26 NOTE — ED PROVIDER NOTES
Encounter Date: 7/26/2018       History     Chief Complaint   Patient presents with    Vaginal Bleeding     Pt c/o vaginal bleeding from GYN surgery on Friday 7/20. Pt reports saturating 2 OB pads per hour. Pt denies weakness & dizziness.     Patient is 27 year old female history of ESRD on HD (skipped today), HTN, s/p liver transplant, anemia who presents with persistent vaginal bleeding. She reports that she was discharged on 7/21 from her most recent hospital stay for vaginal bleeding. During that stay she had a uterine artery ablation by IR. She was discharged with only scant discharge per vagina that had been persistent but reports yesterday she started with bright red bleeding again. She reports she is passing clots this morning. She reports that she has only saturated on pad thus far but she is feeling dizzy. She denies associated chest pain, SOB, nausea, vomiting, abd pain. She is having normal bowel movements. She denies any fever. She did not go to HD today because she knew she would be coming to the ER. She is accompanied by her mother who is at bedside.           Review of patient's allergies indicates:   Allergen Reactions    Chloral hydrate      Other reaction(s): Hallucinations  Other reaction(s): Hives    Hydrocodone Other (See Comments)     Mental status changes     Past Medical History:   Diagnosis Date    Anemia in ESRD (end-stage renal disease) 10/12/2015    dialysis tues, thursday, sat; access left arm    Chronic rejection of liver transplant 3/22/2016    Depression     Encounter for blood transfusion     ESRD on hemodialysis 9/30/2015    History of recent hospitalization 05/2018    pneumonia    History of splenomegaly 4/12/2016    Immunosuppressed 8/5/2017    Iron deficiency anemia secondary to inadequate dietary iron intake 8/16/2017    She receives IV iron periodically at the [facility].   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)     Moderate protein-calorie malnutrition 2017    MRSA bacteremia 2017    Pneumonia     Prophylactic immunotherapy 2014    Renovascular hypertension 10/2/2015    Secondary hyperparathyroidism 2017    Seizures     Sialadenitis 3/21/2018    Thrombocytopenia 2016     Past Surgical History:   Procedure Laterality Date     SECTION      x 2    CONIZATION OF CERVIX USING LOOP ELECTROSURGICAL EXCISION PROCEDURE (LEEP) N/A 6/15/2018    Procedure: CONIZATION-CERVICAL-LEEP;  Surgeon: Neelam Marroquin MD;  Location: [facility] OR;  Service: OB/GYN;  Laterality: N/A;    EMBOLIZATION N/A 2018    Procedure: EMBOLIZATION, BLOOD VESSEL;  Surgeon: Aren Ramos MD;  Location: [facility] CATH LAB;  Service: Radiology;  Laterality: N/A;    EXAMINATION UNDER ANESTHESIA N/A 2018    Procedure: Exam under anesthesia;  Surgeon: Neelam Marroquin MD;  Location: [facility] OR;  Service: OB/GYN;  Laterality: N/A;    EXAMINATION UNDER ANESTHESIA N/A 2018    Procedure: Exam under anesthesia (ADD ON );  Surgeon: NICKIE Alvarez MD;  Location: [facility] OR;  Service: OB/GYN;  Laterality: N/A;  (ADD ON )    LIVER BIOPSY      LIVER TRANSPLANT  1992    PERINEORRHAPHY  2018    Procedure: SUTURE REPAIR,CERVIX;  Surgeon: Neelam Marroquin MD;  Location: [facility];  Service: OB/GYN;;    TUBAL LIGATION       Family History   Problem Relation Age of Onset    Hypertension Mother     Hypertension Father     Melanoma Neg Hx     Breast cancer Neg Hx     Colon cancer Neg Hx     Ovarian cancer Neg Hx      Social History   Substance Use Topics    Smoking status: Never Smoker    Smokeless tobacco: Never Used    Alcohol use No     Review of Systems   Constitutional: Negative for fever.   HENT: Negative for sore throat.    Respiratory: Negative for shortness of breath.    Cardiovascular: Negative for chest pain.   Gastrointestinal: Negative for nausea.   Genitourinary: Positive for vaginal  bleeding. Negative for dysuria.   Musculoskeletal: Negative for back pain.   Skin: Negative for rash.   Neurological: Positive for dizziness. Negative for weakness.   Hematological: Does not bruise/bleed easily.       Physical Exam     Initial Vitals [07/26/18 0900]   BP Pulse Resp Temp SpO2   (!) 193/126 89 18 97.7 °F (36.5 °C) (!) 93 %      MAP       --         Physical Exam    Nursing note and vitals reviewed.  Constitutional: She appears well-developed and well-nourished. She is not diaphoretic. No distress.   Healthy appearing female in NAD or apparent pain. She is resting comfortably during interview and exam. She makes good eye contact, speaks in clear full sentences and does not ambulate during my exam.    HENT:   Head: Normocephalic and atraumatic.   Eyes: Conjunctivae and EOM are normal. Pupils are equal, round, and reactive to light. Right eye exhibits no discharge. Left eye exhibits no discharge. No scleral icterus.   Neck: Normal range of motion.   Cardiovascular: Normal rate, regular rhythm, normal heart sounds and intact distal pulses. Exam reveals no gallop and no friction rub.    No murmur heard.  Pulmonary/Chest: She has no wheezes. She has no rhonchi. She has no rales.   Abdominal: Soft. Bowel sounds are normal. There is no tenderness. There is no rebound and no guarding.   Genitourinary:   Genitourinary Comments: Deferred to GYN staff   Musculoskeletal: Normal range of motion. She exhibits no edema or tenderness.   Lymphadenopathy:     She has no cervical adenopathy.   Neurological: She is alert and oriented to person, place, and time. She has normal strength. No cranial nerve deficit or sensory deficit.   Skin: Skin is warm. Capillary refill takes less than 2 seconds. No rash and no abscess noted. No erythema.   Psychiatric: She has a normal mood and affect. Her behavior is normal. Thought content normal.         ED Course   Procedures  Labs Reviewed   CBC W/ AUTO DIFFERENTIAL   COMPREHENSIVE  METABOLIC PANEL   TYPE & SCREEN         Labs Reviewed   CBC W/ AUTO DIFFERENTIAL - Abnormal; Notable for the following:        Result Value    RBC 3.01 (*)     Hemoglobin 8.3 (*)     Hematocrit 25.4 (*)     RDW 16.3 (*)     Platelets 84 (*)     Lymph # 0.6 (*)     Mono # 0.2 (*)     Eos # 0.6 (*)     Gran% 81.1 (*)     Lymph% 7.9 (*)     Mono% 2.7 (*)     Eosinophil% 8.2 (*)     Platelet Estimate Decreased (*)     All other components within normal limits   COMPREHENSIVE METABOLIC PANEL - Abnormal; Notable for the following:     Glucose 124 (*)     BUN, Bld 29 (*)     Creatinine 7.1 (*)     Albumin 2.5 (*)     Alkaline Phosphatase 568 (*)     eGFR if  8 (*)     eGFR if non  7 (*)     All other components within normal limits   PROTIME-INR   APTT   TYPE & SCREEN            Medical Decision Making:   ED Management:  Urgent evaluation of 27 year old female who presents with complaints of persistent vaginal bleeding. She is afebrile, non-toxic appearing and hemodynamically stable. Physical exam outlined above and reveals benign abdomen, normal cardiopulmonary ascultation and no focal neur deficits. Will consult OB/GYN for recommendations.   10:15 AM Selwyn at bedside  10:52 AM Sy at bedside, vaginal packing placed. Will plan for admission. Will manage pain with PO norco at this time.   11:04 AM plan for hospital admission. Patient may need repeat embolization. Patient stable for transport to the floor. ED MD aware of plan.   Other:   I have discussed this case with another health care provider.       <> Summary of the Discussion: Paulo Dan                      Clinical Impression:   The encounter diagnosis was Vaginal bleeding.                             Marilu Randolph PA-C  07/26/18 1220       Marilu Randolph PA-C  08/02/18 [id number]

## 2018-07-26 NOTE — ED TRIAGE NOTES
Pt presents with c/o heavy vaginal bleeding. Pt had LEEP 06/15/2018. Embolization 7/20/18. Per pt she is bleeding through two pads an hour since yesterday. Pt reports dizziness off and on but does not have any currently. Denies chest pain or SOB. Denies weakness/fatigue and fever/chills. Pt AAOx4, RR even and unlabored. Skin warm, dry, and intact. No swelling to the extremities. Appears in NAD. Bp on arrival was initally high, pt states she did take her lisinopril this morning. Will continue to monitor BP. Resting in stretcher talking to mother who at bedside. Pt on continuous bp and pusle ox monitors. Bed locked and in lowest position, side rails up x1, call light within reach.

## 2018-07-26 NOTE — CONSULTS
Consult/H&P Note  Interventional Radiology    Consult Requested By: gyn    Reason for Consult: vaginal bleeding    SUBJECTIVE:     Chief Complaint: vaginal bleeding    History of Present Illness: 28 yo F with persistent vaginal bleeding for over a month.  She underwent embolization of B anterior division of internal iliac arteries on , with initial improvement, and was discharged, but returns with bright red blood per vagina again.  She is significantly hypertensive.    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 ()    Moderate protein-calorie malnutrition 2017    MRSA bacteremia 2017    Pneumonia     Prophylactic immunotherapy 2014    Renovascular hypertension 10/2/2015    Secondary hyperparathyroidism 2017    Seizures     Sialadenitis 3/21/2018    Thrombocytopenia 2016     Past Surgical History:   Procedure Laterality Date     SECTION      x 2    CONIZATION OF CERVIX USING LOOP ELECTROSURGICAL EXCISION PROCEDURE (LEEP) N/A 6/15/2018    Procedure: CONIZATION-CERVICAL-LEEP;  Surgeon: Neelam Marroquin MD;  Location: [facility] OR;  Service: OB/GYN;  Laterality: N/A;    EMBOLIZATION N/A 2018    Procedure: EMBOLIZATION, BLOOD VESSEL;  Surgeon: Aren Ramos MD;  Location: [facility] CATH LAB;  Service: Radiology;  Laterality: N/A;    EXAMINATION UNDER ANESTHESIA N/A 2018    Procedure:  Exam under anesthesia;  Surgeon: Neelam Marroquin MD;  Location: [facility];  Service: OB/GYN;  Laterality: N/A;    EXAMINATION UNDER ANESTHESIA N/A 7/9/2018    Procedure: Exam under anesthesia (ADD ON );  Surgeon: NICKIE Alvarez MD;  Location: [facility];  Service: OB/GYN;  Laterality: N/A;  (ADD ON )    LIVER BIOPSY      LIVER TRANSPLANT  09/1992    PERINEORRHAPHY  6/19/2018    Procedure: SUTURE REPAIR,CERVIX;  Surgeon: Neelam Marroquin MD;  Location: [facility];  Service: OB/GYN;;    TUBAL LIGATION  2010     Family History   Problem Relation Age of Onset    Hypertension Mother     Hypertension Father     Melanoma Neg Hx     Breast cancer Neg Hx     Colon cancer Neg Hx     Ovarian cancer Neg Hx      Social History   Substance Use Topics    Smoking status: Never Smoker    Smokeless tobacco: Never Used    Alcohol use No       Review of Systems:  Per admit H&P      OBJECTIVE:     Vital Signs Range (Last 24H):  Temp:  [97.7 °F (36.5 °C)]   Pulse:  [89-90]   Resp:  [18]   BP: (178-193)/(115-126)   SpO2:  [93 %-100 %]     Physical Exam:  General- Patient alert and oriented x3 in NAD  ENT- PERRLA,  Neck- No masses  CV- Regular rate and rhythm  Resp-  No increased WOB  GI- Non tender/non-distended  Neuro-  No focal deficits noted.     Physical Exam  Body mass index is 19.3 kg/m².    Scheduled Meds:    citalopram  20 mg Oral Daily    [START ON 7/27/2018] cloNIDine 0.3 mg/24 hr td ptwk  1 patch Transdermal Q7 Days    hydrALAZINE  100 mg Oral Q8H    labetalol  400 mg Oral Q8H    [START ON 7/27/2018] lisinopril  40 mg Oral Daily    NIFEdipine  120 mg Oral Daily     Continuous Infusions:   PRN Meds:HYDROcodone-acetaminophen, HYDROcodone-acetaminophen, HYDROmorphone, ibuprofen, ondansetron, promethazine (PHENERGAN) IVPB, sodium chloride 0.9%    Allergies:   Review of patient's allergies indicates:   Allergen Reactions    Chloral hydrate      Other reaction(s): Hallucinations  Other reaction(s): Hives     Hydrocodone Other (See Comments)     Mental status changes       Labs:    Recent Labs  Lab 07/26/18  1008   INR 1.0       Recent Labs  Lab 07/26/18  0944   WBC 6.95   HGB 8.3*   HCT 25.4*   MCV 84   PLT 84*      Recent Labs  Lab 07/26/18  0944   *      K 3.9      CO2 26   BUN 29*   CREATININE 7.1*   CALCIUM 8.7   ALT 33   AST 36   ALBUMIN 2.5*   BILITOT 0.7       Vitals (Most Recent):  Temp: 97.7 °F (36.5 °C) (07/26/18 0900)  Pulse: 90 (07/26/18 1014)  Resp: 18 (07/26/18 0900)  BP: (!) 178/115 (07/26/18 1014)  SpO2: 100 % (07/26/18 1014)        ASSESSMENT/PLAN:     Ms. Patel is 5 days post embolization of B anterior division of internal iliac arteries with gelfoam and is significantly hypertensive.  Would not recommend repeating embolization at this time, as this was recently performed.  If her blood pressure can be adequately controlled, and bleeding remains refractory, may consider repeat intervention in the future with a more permanent embolic, but there would be higher risk of ischemic complications as this would again be a nonselective embolization.     Active Hospital Problems    Diagnosis  POA    Vaginal bleeding [N93.9]  Yes      Resolved Hospital Problems    Diagnosis Date Resolved POA   No resolved problems to display.           Aren Ramos MD

## 2018-07-26 NOTE — PROGRESS NOTES
Patient with very refractory and persistent postop bleeding after LEEP 6/15/18. This is her 5th admission for bleeding. She has failed monsels packing, oversewing of the anterior LEEP bed, cervical artery ligation sutures with complete oversewing of perimeter of LEEP bed, and IR uterine artery embolization. Patient is not a candidate for hysterectomy due to severe risk of intraabdominal bleeding and multiple comorbidities with poor control. Discussed with IR, not a candidate for further embolization at this time.     Recommend EUA with Sturmdorf sutures as this would apply continued compression without significant necrosis or sloughing, and hopefully allow bleeding control that does not rely on patient's own clotting and healing as much as previous procedures.     R/B/I/A discussed in detail with patient, all questions answered. Consents for blood and EUA with cervical sutures signed and to chart.    Plan discussed with GYN staff, patient, and mother. All in agreement.    - patient has been NPO since 8-8:30 AM  - case request placed and OR desk notified  - to OR lalo Ruiz MD  OBGYN - PGY 4       I have reviewed the resident's note, evaluated the patient and agree with the diagnosis and management plan

## 2018-07-26 NOTE — CONSULTS
Consult Note  Gynecology    Consult Requested By: Emergency Medicine  Reason for Consult: Heavy vaginal bleeding    SUBJECTIVE:     History of Present Illness:  Holly Patel is a 27 y.o.  with PMHx significant for liver transplant on chronic immunosuppression, poorly controlled HTN, and ESRD on HD who presents in the ED with postop vaginal bleeding that began last night. Ms. Patel is well-known to GYN service. Patient had a LEEP on 6/15/18 and has had intermittent issues with bleeding since then. She had EUA with oversewing of anterior edge of LEEP bed on . Since then she has been admitted for management of HTN urgency on , , and . During  admission, she had heavy bleeding which was managed with monsels and vaginal packing, as her BP was too high to safely operate. Patient was discharged on 7/10/18 after having surgery on 18 for again re bleeding of the LEEP site after a HTN episode. At that time patient had EUA with stay sutures placed and application of monsels and surgicel applied. She received a total of 2uPRBCs at that time. Patient admitted  for re-bleeding and light-headedness. UAE was performed and a total of 4u PRBCs were transfused at this visit. She was discharged on .    Today, patient states that light brown spotty discharge present on last discharge from hospital never ceased. Last night she noticed BRB per vagina with golf-ball sized clots. She states that she continues to bleed through 1 pad per hour. She presented to the ED today in lieu of attending her scheduled dialysis. She states she is dizzy when she ambulates. She denies LOC, fatigue, abdominal pain, fever, chills, CP, and SOB. Patient is anuric 2/2 ESRD.       PMHx:   Past Medical History:   Diagnosis Date    Anemia in ESRD (end-stage renal disease) 10/12/2015    dialysis tues, thursday, sat; access left arm    Chronic rejection of liver transplant 3/22/2016    Depression     Encounter for  blood transfusion     ESRD on hemodialysis 2015    History of recent hospitalization 2018    pneumonia    History of splenomegaly 2016    Immunosuppressed 2017    Iron deficiency anemia secondary to inadequate dietary iron intake 2017    She [facility].   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 ()    Moderate protein-calorie malnutrition 2017    MRSA bacteremia 2017    Pneumonia     Prophylactic immunotherapy 2014    Renovascular hypertension 10/2/2015    Secondary hyperparathyroidism 2017    Seizures     Sialadenitis 3/21/2018    Thrombocytopenia 2016       PSHx:   Past Surgical History:   Procedure Laterality Date     SECTION      x 2    CONIZATION OF CERVIX USING LOOP ELECTROSURGICAL EXCISION PROCEDURE (LEEP) N/A 6/15/2018    Procedure: CONIZATION-CERVICAL-LEEP;  Surgeon: Neelam Marroquin MD;  Location: [facility] OR;  Service: OB/GYN;  Laterality: N/A;    EMBOLIZATION N/A 2018    Procedure: EMBOLIZATION, BLOOD VESSEL;  Surgeon: Aren Ramos MD;  Location: [facility] operated [facility] CATH LAB;  Service: Radiology;  Laterality: N/A;    EXAMINATION UNDER ANESTHESIA N/A 2018    Procedure: Exam under anesthesia;  Surgeon: Neelam Marroquin MD;  Location: [facility] operated [facility] OR;  Service: OB/GYN;  Laterality: N/A;    EXAMINATION UNDER ANESTHESIA N/A 2018    Procedure: Exam under anesthesia (ADD ON );  Surgeon: NICKIE Alvarez MD;  Location: [facility] operated [facility] OR;  Service: OB/GYN;  Laterality: N/A;  (ADD ON )    LIVER BIOPSY      LIVER TRANSPLANT  1992    PERINEORRHAPHY  2018    Procedure: SUTURE REPAIR,CERVIX;  Surgeon: Neelam Marroquin MD;  Location: [facility] OR;  Service: OB/GYN;;    TUBAL LIGATION         All:   Review of patient's allergies indicates:   Allergen Reactions    Chloral hydrate      Other reaction(s): Hallucinations  Other reaction(s): Hives    Hydrocodone Other  (See Comments)     Mental status changes       Meds:   (Not in a hospital admission)    SH:   Social History     Social History    Marital status: Legally      Spouse name: N/A    Number of children: N/A    Years of education: N/A     Occupational History    Not on file.     Social History Main Topics    Smoking status: Never Smoker    Smokeless tobacco: Never Used    Alcohol use No    Drug use: No    Sexual activity: No     Other Topics Concern    Are You Pregnant Or Think You May Be? No    Breast-Feeding No     Social History Narrative    Lives 2 kids, 7 and 8, and nephew. Her mom helps with kids.       FH:   Family History   Problem Relation Age of Onset    Hypertension Mother     Hypertension Father     Melanoma Neg Hx     Breast cancer Neg Hx     Colon cancer Neg Hx     Ovarian cancer Neg Hx        Review of Systems:  Constitutional: no fever or chills, dizziness  Respiratory: no cough or shortness of breath  Cardiovascular: no chest pain or palpitations  Gastrointestinal: no nausea or vomiting, tolerating diet, negative for change in bowel habits  Genitourinary: vaginal bleeding, anuric  Integument/Breast: no rash or pruritis, negative for breast lump, nipple discharge and skin lesion(s)  Hematologic/Lymphatic: no easy bruising or lymphadenopathy     OBJECTIVE:     Temp:  [97.7 °F (36.5 °C)] 97.7 °F (36.5 °C)  Pulse:  [89] 89  Resp:  [18] 18  SpO2:  [93 %] 93 %  BP: (193)/(126) 193/126    Recent Results (from the past 24 hour(s))   CBC auto differential    Collection Time: 07/26/18  9:44 AM   Result Value Ref Range    WBC 6.95 3.90 - 12.70 K/uL    RBC 3.01 (L) 4.00 - 5.40 M/uL    Hemoglobin 8.3 (L) 12.0 - 16.0 g/dL    Hematocrit 25.4 (L) 37.0 - 48.5 %    MCV 84 82 - 98 fL    MCH 27.6 27.0 - 31.0 pg    MCHC 32.7 32.0 - 36.0 g/dL    RDW 16.3 (H) 11.5 - 14.5 %    MPV 10.5 9.2 - 12.9 fL         Physical Exam:  GEN: No apparent distress. Alert and oriented.   NECK: No thyroid tenderness,  no palpable masses or nodules.  CV: Regular rate and rhythm. Normal sounding S1 and S2. No murmur, rub, or gallop noted.  LUNGS: Clear to auscultation bilaterally. No wheezes, rales or rhonchi noted.  ABDOMEN: Soft, non tender, mildly distended - consistent with past exams. Good bowel sounds. No guarding or rebound tenderness.  EXT: No erythema, no edema  EXT. GENITALIA: appear normal, no lesions noted  VAGINA: Pink, moist, and well-rugated. Large amount of dark red/black golf-ball sized clots present in vagina, moderate amount of dark blood in vault, no lesions noted  CERVIX: Large clots removed with ring forceps to reveal mild active bleeding of cervix at 2-o'oclock      ASSESSMENT/PLAN:     Assessment: Holly Patel is a 27 y.o.  who presents with vaginal bleeding and dizziness s/p UAE on . Patient currently stable.    Plan:  1. Postop bleeding after LEEP  - LEEP performed 6/15/2018, this is patient's 7th ED visit and admission for vaginal bleeding s/p LEEP  - UAE performed by IR on   - Vaginal packing performed in ED today, will admit for management of bleeding  - Moderate amount of discomfort during exam, will manage pain with PO ibuprofen 600 q6h, norco 5/10's prn, and IV dilaudid for BTP  - Saturating one pad per hour  - Strict pad counts     2. ABLA  - T&S, H/H 8.3/25 in the ED  - CBC stable, was 9.6/29 on  after transfusion of 4u PRBCs  - CBC orders for q8h, will continue to monitor patient for signs and symptoms of anemia     3. HTN  - discussed uncontrolled HTN as contributing cause of repetitive re-bleeds, strongly encouraged compliance with antihypertensive regimen  - continue all home antihypertensives while inpatient  - BP: 196/126     4. H/o liver transplant  - continue home immunosuppression regimen     5. ESRD on HD  - receives HD //Sat, missed scheduled dialysis today  - Plan for dialysis today after admission  - Will consult nephrology        Trena Samano  MD ANDRES, PGY-1     Plan discussed with upper level resident who was in agreement.      Bleeding from cervix 2nd to Leep procedure.  Plan Kalina daniels

## 2018-07-26 NOTE — PLAN OF CARE
PCP:  Stan Sosa  Last saw 1 month ago  Melita  Is gyn.  D/c Monday    Pharmacy:   [facility]    [place]  [phone] mom helps with care and transportation.   Sister cares for dependent children       07/26/18 1216   Discharge Assessment   Assessment Type Discharge Planning Assessment   Confirmed/corrected address and phone number on facesheet? Yes   Assessment information obtained from? Patient   Communicated expected length of stay with patient/caregiver yes   Prior to hospitilization cognitive status: Alert/Oriented   Prior to hospitalization functional status: Independent   Current cognitive status: Alert/Oriented   Current Functional Status: Independent   Lives With child(ester), dependent;parent(s)   Able to Return to Prior Arrangements yes   Is patient able to care for self after discharge? Yes   Who are your caregiver(s) and their phone number(s)? [place]  [phone] mom    Patient's perception of discharge disposition admitted as an inpatient   Readmission Within The Last 30 Days no previous admission in last 30 days;previous discharge plan unsuccessful   If yes, most recent facility name: [facility]    Patient currently being followed by outpatient case management? No   Patient currently receives home health services? No   Patient currently receives any other outside agency services? No   Equipment Currently Used at Home none   Do you have any problems affording any of your prescribed medications? No   Is the patient taking medications as prescribed? yes   Does the patient have transportation home? Yes   Transportation Available family or friend will provide   Does the patient receive services at the [facility]? No   Discharge Plan A Home with family   Patient/Family In Agreement With Plan yes   Does the patient currently use HME? No   Does the patient receive outpatient dialysis? Yes  ([facility])   Readmission Questionnaire   At the time of your discharge, did someone talk to you about  what your health problems were? Yes   At the time of discharge, did someone talk to you about what to watch out for regarding worsening of your health problem? Yes   At the time of discharge, did someone talk to you about what to do if you experienced worsening of your health problem? Yes   At the time of discharge, did someone talk to you about which medication to take when you left the hospital and which ones to stop taking? Yes   At the time of discharge, did someone talk to you about when and where to follow up with a doctor after you left the hospital? Yes   What do you believe caused you to be sick enough to be re-admitted? bleeding and high blood pressure    How often do you need to have someone help you when you read instructions, pamphlets, or other written material from your doctor or pharmacy? Never   Do you have problems taking your medications as prescribed? Yes   Do you have any problems affording any of  your prescribed medications? No   Do you have problems obtaining/receiving your medications? No   Does the patient have transportation to healthcare appointments? Yes   Living Arrangements house   Does the patient have family/friends to help with healtcare needs after discharge? yes   Does your caregiver provide all the help you need? Yes   Are you currently feeling confused? No   Are you currently having problems thinking? No   Are you currently having memory problems? No   Have you felt down, depressed, or hopeless? 0   Have you felt little interest or pleasure in doing things? 0   In the last 7 days, my sleep quality was: poor

## 2018-07-26 NOTE — OP NOTE
OPERATIVE REPORT    PREOPERATIVE DIAGNOSIS  1. Postop bleeding s/p LEEP followed by EUA with cervical suturing x2 and UAE  2. ESRD on HD  3. Poorly controlled HTN  4. H/o liver transplant on chronic immunosuppression    POSTOPERATIVE DIAGNOSIS  1. Postop bleeding s/p LEEP followed by EUA with cervical suturing x2 and UAE  2. ESRD on HD  3. Poorly controlled HTN  4. H/o liver transplant on chronic immunosuppression  5. Severe vaginal atrophy  6. Desquamative inflammatory vaginitis    PROCEDURE:  1. Exam under anesthesia  2. Cervical suturing with Sturmdorf sutures    SURGEON: Lei Sims III, MD    ASSISTANT: Roberta Ruiz MD (RES)    ANESTHESIA: General    COMPLICATIONS: None    EBL: 10 cc    IV FLUIDS: 400  cc    URINE OUTPUT: N/A, patient anuric, on dialysis    FINDINGS:    - Foul smell and moderate bleeding on packing when removed  - Severely atrophic vagina with multiple abrasions present, particularly anteriorly. Some may be from packing in place during the day as atrophy so severe   - Cervix necrotic at LEEP bed, old sutures removed. Sturmdorf sutures placed anteriorly and posteriorly with good hemostasis.  - Recommend daily vaginal cleocin (unable to get from pharmacy at this time) and premarin cream    SPECIMENS: none    PROCEDURE:   Patient was taken to the operating room where general anesthesia was administered and found to be adequate.  She was placed in the dorsal lithotomy position using Lencho stirrups, then prepped and draped in the usual sterile fashion. Packing removed prior to prepping with mild to moderate blood present, as well as foul odor and small amount of discharge.  A surgical timeout was performed with patient's name, date of birth, procedure to be performed, and allergies verbalized. All OR staff in agreement. No systemic preoperative antibiotics were administered as patient on HD and has impaired clearance.    Attention was then turned to the vagina where a weighted sterile speculum was  placed in the posterior aspect, and a right angle retractor was placed in the anterior aspect.  The anterior lip of the cervix was grasped with a ring forcep.  Thorough exam revealed findings as described above. Sturmdorf sutures of 0 vicryl were placed anteriorly and posteriorly with good hemostasis noted from the cervix. No active bleeding noted throughout the case. Cervix and vaginal were irrigated and suctioned, and hemostasis again noted. All instruments were removed from the vagina. 6 g premarin cream inserted into vagina and another 2g applied to the exterior vulva. Also recommend cleocin cream 2% although not on formulary therefore not available.    Sponge and instrument counts were correct x 2. The patient tolerated the procedure well and was awakened without difficulty. She was taken to the recovery room in stable condition.    Roberta Ruiz MD  OBGYN - PGY 4    I was present for and participated in the entire surgical procedure.

## 2018-07-26 NOTE — TELEPHONE ENCOUNTER
----- Message from Estrella Valdes sent at 7/26/2018  3:44 PM CDT -----  Contact: self  Dr. Suárez is calling to inform you that the pt is in the hospital. Her call back number is [phone]

## 2018-07-27 ENCOUNTER — TELEPHONE (OUTPATIENT)
Dept: OBSTETRICS AND GYNECOLOGY | Facility: CLINIC | Age: 28
End: 2018-07-27

## 2018-07-27 LAB
ALBUMIN SERPL BCP-MCNC: 2.1 G/DL
ANION GAP SERPL CALC-SCNC: 9 MMOL/L
ANISOCYTOSIS BLD QL SMEAR: SLIGHT
BASOPHILS # BLD AUTO: 0.01 K/UL
BASOPHILS NFR BLD: 0.1 %
BUN SERPL-MCNC: 17 MG/DL
CALCIUM SERPL-MCNC: 8.8 MG/DL
CHLORIDE SERPL-SCNC: 102 MMOL/L
CO2 SERPL-SCNC: 25 MMOL/L
CREAT SERPL-MCNC: 4.5 MG/DL
DIFFERENTIAL METHOD: ABNORMAL
EOSINOPHIL # BLD AUTO: 0.7 K/UL
EOSINOPHIL NFR BLD: 6.4 %
ERYTHROCYTE [DISTWIDTH] IN BLOOD BY AUTOMATED COUNT: 16.4 %
EST. GFR  (AFRICAN AMERICAN): 14 ML/MIN/1.73 M^2
EST. GFR  (NON AFRICAN AMERICAN): 13 ML/MIN/1.73 M^2
GIANT PLATELETS BLD QL SMEAR: PRESENT
GLUCOSE SERPL-MCNC: 101 MG/DL
HCT VFR BLD AUTO: 25.4 %
HGB BLD-MCNC: 8.2 G/DL
HYPOCHROMIA BLD QL SMEAR: ABNORMAL
LYMPHOCYTES # BLD AUTO: 0.6 K/UL
LYMPHOCYTES NFR BLD: 6.2 %
MCH RBC QN AUTO: 27.3 PG
MCHC RBC AUTO-ENTMCNC: 32.3 G/DL
MCV RBC AUTO: 85 FL
MONOCYTES # BLD AUTO: 0.5 K/UL
MONOCYTES NFR BLD: 4.4 %
NEUTROPHILS # BLD AUTO: 8.5 K/UL
NEUTROPHILS NFR BLD: 82.9 %
PHOSPHATE SERPL-MCNC: 4 MG/DL
PLATELET # BLD AUTO: 103 K/UL
PLATELET BLD QL SMEAR: ABNORMAL
PMV BLD AUTO: 9 FL
POTASSIUM SERPL-SCNC: 4.7 MMOL/L
RBC # BLD AUTO: 3 M/UL
SODIUM SERPL-SCNC: 136 MMOL/L
WBC # BLD AUTO: 10.25 K/UL

## 2018-07-27 PROCEDURE — 63600175 PHARM REV CODE 636 W HCPCS: Performed by: INTERNAL MEDICINE

## 2018-07-27 PROCEDURE — 63600175 PHARM REV CODE 636 W HCPCS: Performed by: STUDENT IN AN ORGANIZED HEALTH CARE EDUCATION/TRAINING PROGRAM

## 2018-07-27 PROCEDURE — 80069 RENAL FUNCTION PANEL: CPT

## 2018-07-27 PROCEDURE — 85025 COMPLETE CBC W/AUTO DIFF WBC: CPT

## 2018-07-27 PROCEDURE — 94761 N-INVAS EAR/PLS OXIMETRY MLT: CPT

## 2018-07-27 PROCEDURE — 25000003 PHARM REV CODE 250: Performed by: INTERNAL MEDICINE

## 2018-07-27 PROCEDURE — 25000003 PHARM REV CODE 250: Performed by: NURSE PRACTITIONER

## 2018-07-27 PROCEDURE — 36415 COLL VENOUS BLD VENIPUNCTURE: CPT

## 2018-07-27 PROCEDURE — 25000003 PHARM REV CODE 250: Performed by: STUDENT IN AN ORGANIZED HEALTH CARE EDUCATION/TRAINING PROGRAM

## 2018-07-27 PROCEDURE — 11000001 HC ACUTE MED/SURG PRIVATE ROOM

## 2018-07-27 RX ORDER — FAMOTIDINE 20 MG/1
20 TABLET, FILM COATED ORAL DAILY
Status: DISCONTINUED | OUTPATIENT
Start: 2018-07-27 | End: 2018-07-29 | Stop reason: HOSPADM

## 2018-07-27 RX ORDER — METOPROLOL TARTRATE 50 MG/1
100 TABLET ORAL 2 TIMES DAILY
Status: DISCONTINUED | OUTPATIENT
Start: 2018-07-27 | End: 2018-07-29 | Stop reason: HOSPADM

## 2018-07-27 RX ORDER — NIFEDIPINE 30 MG/1
60 TABLET, EXTENDED RELEASE ORAL 2 TIMES DAILY
Status: DISCONTINUED | OUTPATIENT
Start: 2018-07-27 | End: 2018-07-29 | Stop reason: HOSPADM

## 2018-07-27 RX ORDER — METOPROLOL TARTRATE 50 MG/1
50 TABLET ORAL ONCE
Status: COMPLETED | OUTPATIENT
Start: 2018-07-27 | End: 2018-07-27

## 2018-07-27 RX ORDER — METOPROLOL TARTRATE 50 MG/1
50 TABLET ORAL 2 TIMES DAILY
Status: DISCONTINUED | OUTPATIENT
Start: 2018-07-27 | End: 2018-07-27

## 2018-07-27 RX ORDER — TACROLIMUS 1 MG/1
7 CAPSULE ORAL 2 TIMES DAILY
Status: DISCONTINUED | OUTPATIENT
Start: 2018-07-27 | End: 2018-07-29 | Stop reason: HOSPADM

## 2018-07-27 RX ORDER — MYCOPHENOLATE MOFETIL 250 MG/1
1000 CAPSULE ORAL 2 TIMES DAILY
Status: DISCONTINUED | OUTPATIENT
Start: 2018-07-27 | End: 2018-07-29 | Stop reason: HOSPADM

## 2018-07-27 RX ORDER — PREDNISONE 1 MG/1
1 TABLET ORAL EVERY OTHER DAY
Status: DISCONTINUED | OUTPATIENT
Start: 2018-07-27 | End: 2018-07-29 | Stop reason: HOSPADM

## 2018-07-27 RX ORDER — SIMETHICONE 80 MG
80 TABLET,CHEWABLE ORAL EVERY 4 HOURS PRN
Status: DISCONTINUED | OUTPATIENT
Start: 2018-07-27 | End: 2018-07-29 | Stop reason: HOSPADM

## 2018-07-27 RX ORDER — METOCLOPRAMIDE HYDROCHLORIDE 5 MG/ML
5 INJECTION INTRAMUSCULAR; INTRAVENOUS EVERY 6 HOURS PRN
Status: DISCONTINUED | OUTPATIENT
Start: 2018-07-27 | End: 2018-07-29 | Stop reason: HOSPADM

## 2018-07-27 RX ORDER — CINACALCET 30 MG/1
30 TABLET, FILM COATED ORAL
Status: DISCONTINUED | OUTPATIENT
Start: 2018-07-27 | End: 2018-07-29 | Stop reason: HOSPADM

## 2018-07-27 RX ORDER — CALCITRIOL 0.25 UG/1
0.25 CAPSULE ORAL
Status: DISCONTINUED | OUTPATIENT
Start: 2018-07-27 | End: 2018-07-29 | Stop reason: HOSPADM

## 2018-07-27 RX ORDER — DIPHENHYDRAMINE HCL 25 MG
25 CAPSULE ORAL EVERY 4 HOURS PRN
Status: DISCONTINUED | OUTPATIENT
Start: 2018-07-27 | End: 2018-07-29 | Stop reason: HOSPADM

## 2018-07-27 RX ADMIN — LISINOPRIL 40 MG: 20 TABLET ORAL at 10:07

## 2018-07-27 RX ADMIN — CALCITRIOL 0.25 MCG: 0.25 CAPSULE, LIQUID FILLED ORAL at 03:07

## 2018-07-27 RX ADMIN — HYDRALAZINE HYDROCHLORIDE 20 MG: 20 INJECTION INTRAMUSCULAR; INTRAVENOUS at 06:07

## 2018-07-27 RX ADMIN — TACROLIMUS 7 MG: 1 CAPSULE ORAL at 09:07

## 2018-07-27 RX ADMIN — MYCOPHENOLATE MOFETIL 1000 MG: 250 CAPSULE ORAL at 09:07

## 2018-07-27 RX ADMIN — METOPROLOL TARTRATE 50 MG: 50 TABLET ORAL at 09:07

## 2018-07-27 RX ADMIN — HYDRALAZINE HYDROCHLORIDE 100 MG: 25 TABLET, FILM COATED ORAL at 06:07

## 2018-07-27 RX ADMIN — NIFEDIPINE 60 MG: 30 TABLET, FILM COATED, EXTENDED RELEASE ORAL at 09:07

## 2018-07-27 RX ADMIN — HYDROCODONE BITARTRATE AND ACETAMINOPHEN 1 TABLET: 10; 325 TABLET ORAL at 10:07

## 2018-07-27 RX ADMIN — CITALOPRAM HYDROBROMIDE 20 MG: 20 TABLET ORAL at 10:07

## 2018-07-27 RX ADMIN — HYDRALAZINE HYDROCHLORIDE 100 MG: 25 TABLET, FILM COATED ORAL at 02:07

## 2018-07-27 RX ADMIN — HYDROCODONE BITARTRATE AND ACETAMINOPHEN 1 TABLET: 10; 325 TABLET ORAL at 06:07

## 2018-07-27 RX ADMIN — LABETALOL HYDROCHLORIDE 400 MG: 200 TABLET, FILM COATED ORAL at 06:07

## 2018-07-27 RX ADMIN — HYDROCODONE BITARTRATE AND ACETAMINOPHEN 1 TABLET: 10; 325 TABLET ORAL at 12:07

## 2018-07-27 RX ADMIN — HYDRALAZINE HYDROCHLORIDE 100 MG: 25 TABLET, FILM COATED ORAL at 09:07

## 2018-07-27 RX ADMIN — CONJUGATED ESTROGENS 2 G: 0.62 CREAM VAGINAL at 09:07

## 2018-07-27 RX ADMIN — PREDNISONE 1 MG: 1 TABLET ORAL at 10:07

## 2018-07-27 RX ADMIN — METOPROLOL TARTRATE 100 MG: 50 TABLET ORAL at 09:07

## 2018-07-27 RX ADMIN — METOPROLOL TARTRATE 50 MG: 50 TABLET ORAL at 03:07

## 2018-07-27 NOTE — PLAN OF CARE
LMSW met with patient at the bedside. Patient is alert and oriented with no communication barriers. Patient states she does not need assistance with social security documents. LMSW gave patient advance directive information. Consult completed.

## 2018-07-27 NOTE — PROGRESS NOTES
"Nephrology  Progress Note    Admit Date: 7/26/2018   LOS: 1 day     SUBJECTIVE:     Follow-up For:  ESRD/Acc HTN    Interval History:    Uneventful night.  No c/o this am.  Asking to go home.  No CP/SOB.  BP much improved which proves the point of her noncompliance at home with medication.  Expressed my findings to her/mom at bedside.  Lety lara box at the bedside!    Review of Systems:  Constitutional: No fever or chills  Respiratory: No cough or shortness of breath  Cardiovascular: No chest pain or palpitations  Gastrointestinal: No nausea or vomiting  Neurological: No confusion or weakness    OBJECTIVE:     Vital Signs Range (Last 24H):  BP (!) 160/97 (BP Location: Right arm, Patient Position: Lying)   Pulse 97   Temp 98.2 °F (36.8 °C) (Oral)   Resp 18   Ht 5' 5" (1.651 m)   Wt 52.6 kg (116 lb)   LMP  (LMP Unknown)   SpO2 98%   Breastfeeding? No   BMI 19.30 kg/m²     Temp:  [98 °F (36.7 °C)-98.8 °F (37.1 °C)]   Pulse:  []   Resp:  [16-20]   BP: (137-210)/()   SpO2:  [94 %-100 %]     I & O (Last 24H):  Intake/Output Summary (Last 24 hours) at 07/27/18 0941  Last data filed at 07/27/18 0500   Gross per 24 hour   Intake             1540 ml   Output             2500 ml   Net             -960 ml       Physical Exam:  General appearance: Well developed, well nourished  Eyes:  Conjunctivae/corneas clear. PERRL.  Lungs: Normal respiratory effort,   clear to auscultation bilaterally   Heart: Regular rate and rhythm, S1, S2 normal, no murmur, rub or herbert.  Abdomen: Soft, non-tender non-distended; bowel sounds normal; no masses,  no organomegaly  Extremities: No cyanosis or clubbing. No edema.    Skin: Skin color, texture, turgor normal. No rashes or lesions  Neurologic: Normal strength and tone. No focal numbness or weakness  LUE AVF     Laboratory Data:    Recent Labs  Lab 07/27/18  0539   WBC 10.25   RBC 3.00*   HGB 8.2*   HCT 25.4*   *   MCV 85   MCH 27.3   MCHC 32.3       BMP: "   Recent Labs  Lab 07/27/18  0539         K 4.7      CO2 25   BUN 17   CREATININE 4.5*   CALCIUM 8.8     Lab Results   Component Value Date    CALCIUM 8.8 07/27/2018    PHOS 4.0 07/27/2018       Lab Results   Component Value Date    PTH 1,771.8 (H) 06/28/2018    CALCIUM 8.8 07/27/2018    CAION 0.97 (L) 01/26/2018    PHOS 4.0 07/27/2018       Lab Results   Component Value Date    URICACID 4.7 05/16/2018       BNP  No results for input(s): BNP, BNPTRIAGEBLO in the last 168 hours.    Medications:  Medication list was reviewed and changes noted under Assessment/Plan.    Diagnostic Results:        ASSESSMENT/PLAN:     1. ESRD/HD(N 18.6, Z 99.2, I 12.0):   -HD yesterday.  No need for HD today. Continue TTS while inpt.   -Discussed at length dialysis and medication adherence.  -She dialyzes at [facility] the [facility] under the care of Dr. Bass.  I spoke with Dr. Bass at length yesterday--he confirms patient's nonadherence to medication regimen.  -Renally dose meds, avoid nephrotoxins, and monitor I/O's closely.  - Discussed low salt diet and fluid restriction.        2. Acute on Chronic Anemia of ESRD, acute blood loss anemia (D 63.1, N93.9, D50):    -stable  -Will dose Procrit once BPs controlled.  -Acute blood loss from vaginal bleeding.        3. Chronic Thrombocytopenia (D69.6)  -Coags are normal.       4. S/P Liver Transplant.(Z 94.4):   - She will need to resume immunosuppression.  GI consult reviewed.  - Tacrolimus level undetectable on each of prior admissions.     5.2HPT (N25.81)  -Continue Calcitriol and Sensipar  -noncompliant as her PTH >1700       6. HTN (I12.0, Z91.14):    - Resume home meds- labetolol, clonidine patch, hydralazine, lisinopril, nifedipine.  - adjusted few medications to simplify her regimen.  Stop Labetolol and start Metoprolol tartrate 100mg bid.   - Increased Clonidine patch to 0.3mg qd.  Will take another day or two for BPs to reflect above changes.    Dispo:  Have discussed at length pt's noncompliance and risks factors for death.       Prosper Panda MD  Nephrology

## 2018-07-27 NOTE — TELEPHONE ENCOUNTER
Returned called to Dr. Minaya. Informed her that Dr. Sims and nurse Gomez are out of the clinic today. Advised doctor to admit patient if medically neccessary. Dr. Minaya voiced understanding.

## 2018-07-27 NOTE — OR NURSING
Hydralazine 20mg iv given for bp 180systolic. Report called to elsie sigala. Voice mail left on dr cortez cell phone letting him know that we can't give vaginal cleocin not on formulary.gyn resident paged no reply.

## 2018-07-27 NOTE — PLAN OF CARE
Problem: Patient Care Overview  Goal: Plan of Care Review  Outcome: Ongoing (interventions implemented as appropriate)  Uneventful shift;pt BP remained stable during this shift; no active vaginal bleeding at this time; pt educated on the importance of taking bp medications regularly to maintain a stable bp; pt verbalized understanding; pt in bed resting with no complaints at this time; NADN; VSS; family member at bedside; will monitor until oncoming nurse arrives

## 2018-07-27 NOTE — PROGRESS NOTES
Progress Note  Gynecology    Admit Date: 2018  LOS: 1    Reason for Admission:  Postoperative vaginal bleeding following genitourinary procedure    SUBJECTIVE:     Holly Patel is a 27 y.o.  F with PMHx including ESRD on HD, poorly controlled HTN, s/p liver transplant on chronic immunosuppression, and refractory postop bleeding from LEEP done 6/15/18. This is her 7th admission for postop bleeding management.    POD#1 s/p EUA/Sturmdorf sutures. No acute issues since surgery last night. Reports pain resolved with current PO regimen. Used 1 pad overnight, moderate amount of blood on pad. Denies fever, chills, nausea, vomiting. Ambulating without difficulty. No new complaints this AM.    OBJECTIVE:     Vital Signs   Temp:  [97.7 °F (36.5 °C)-98.8 °F (37.1 °C)] 98.2 °F (36.8 °C)  Pulse:  [] 97  Resp:  [16-20] 18  SpO2:  [93 %-100 %] 98 %  BP: (137-210)/() 160/97      Intake/Output Summary (Last 24 hours) at 18 0815  Last data filed at 18 0500   Gross per 24 hour   Intake             1540 ml   Output             2500 ml   Net             -960 ml       Physical Exam:  Gen: A&Ox3, NAD  CV: RRR  Pulm: normal respiratory effort  Abd: soft, non-distended, non-tender to palpation without rebound or guarding  Pelvic: moderate blood on pad  Ext: no peripheral edema, TEDs/SCDs in place    Laboratory:  Recent Results (from the past 24 hour(s))   CBC auto differential    Collection Time: 18  9:44 AM   Result Value Ref Range    WBC 6.95 3.90 - 12.70 K/uL    RBC 3.01 (L) 4.00 - 5.40 M/uL    Hemoglobin 8.3 (L) 12.0 - 16.0 g/dL    Hematocrit 25.4 (L) 37.0 - 48.5 %    MCV 84 82 - 98 fL    MCH 27.6 27.0 - 31.0 pg    MCHC 32.7 32.0 - 36.0 g/dL    RDW 16.3 (H) 11.5 - 14.5 %    Platelets 84 (L) 150 - 350 K/uL    MPV 10.5 9.2 - 12.9 fL    Gran # (ANC) 5.6 1.8 - 7.7 K/uL    Lymph # 0.6 (L) 1.0 - 4.8 K/uL    Mono # 0.2 (L) 0.3 - 1.0 K/uL    Eos # 0.6 (H) 0.0 - 0.5 K/uL    Baso # 0.01 0.00 -  0.20 K/uL    Gran% 81.1 (H) 38.0 - 73.0 %    Lymph% 7.9 (L) 18.0 - 48.0 %    Mono% 2.7 (L) 4.0 - 15.0 %    Eosinophil% 8.2 (H) 0.0 - 8.0 %    Basophil% 0.1 0.0 - 1.9 %    Platelet Estimate Decreased (A)     Aniso Slight     Hypo Occasional     Large/Giant Platelets Present     Differential Method Automated    Comprehensive metabolic panel    Collection Time: 07/26/18  9:44 AM   Result Value Ref Range    Sodium 138 136 - 145 mmol/L    Potassium 3.9 3.5 - 5.1 mmol/L    Chloride 100 95 - 110 mmol/L    CO2 26 23 - 29 mmol/L    Glucose 124 (H) 70 - 110 mg/dL    BUN, Bld 29 (H) 6 - 20 mg/dL    Creatinine 7.1 (H) 0.5 - 1.4 mg/dL    Calcium 8.7 8.7 - 10.5 mg/dL    Total Protein 7.3 6.0 - 8.4 g/dL    Albumin 2.5 (L) 3.5 - 5.2 g/dL    Total Bilirubin 0.7 0.1 - 1.0 mg/dL    Alkaline Phosphatase 568 (H) 55 - 135 U/L    AST 36 10 - 40 U/L    ALT 33 10 - 44 U/L    Anion Gap 12 8 - 16 mmol/L    eGFR if African American 8 (A) >60 mL/min/1.73 m^2    eGFR if non African American 7 (A) >60 mL/min/1.73 m^2   Type & Screen    Collection Time: 07/26/18  9:44 AM   Result Value Ref Range    Group & Rh B POS     Indirect Frederic NEG    Prepare RBC    Collection Time: 07/26/18  9:44 AM   Result Value Ref Range    UNIT NUMBER [id number]     PRODUCT CODE Q7503J13     DISPENSE STATUS CROSSMATCHED     CODING SYSTEM XARS962     Unit Blood Type Code 7300     Unit Blood Type B POS     Unit Expiration [id number]     UNIT NUMBER [id number]     PRODUCT CODE R4601E33     DISPENSE STATUS CROSSMATCHED     CODING SYSTEM [id number]     Unit Blood Type Code 7300     Unit Blood Type B POS     Unit Expiration [id number]    Protime-INR    Collection Time: 07/26/18 10:08 AM   Result Value Ref Range    Prothrombin Time 10.7 9.0 - 12.5 sec    INR 1.0 0.8 - 1.2   APTT    Collection Time: 07/26/18 10:08 AM   Result Value Ref Range    aPTT 29.6 21.0 - 32.0 sec   POCT glucose    Collection Time: 07/26/18  9:47 PM   Result Value Ref Range    POCT Glucose 93 70 -  110 mg/dL   Renal function panel    Collection Time: 07/27/18  5:39 AM   Result Value Ref Range    Glucose 101 70 - 110 mg/dL    Sodium 136 136 - 145 mmol/L    Potassium 4.7 3.5 - 5.1 mmol/L    Chloride 102 95 - 110 mmol/L    CO2 25 23 - 29 mmol/L    BUN, Bld 17 6 - 20 mg/dL    Calcium 8.8 8.7 - 10.5 mg/dL    Creatinine 4.5 (H) 0.5 - 1.4 mg/dL    Albumin 2.1 (L) 3.5 - 5.2 g/dL    Phosphorus 4.0 2.7 - 4.5 mg/dL    eGFR if African American 14 (A) >60 mL/min/1.73 m^2    eGFR if non African American 13 (A) >60 mL/min/1.73 m^2    Anion Gap 9 8 - 16 mmol/L   CBC auto differential    Collection Time: 07/27/18  5:39 AM   Result Value Ref Range    WBC 10.25 3.90 - 12.70 K/uL    RBC 3.00 (L) 4.00 - 5.40 M/uL    Hemoglobin 8.2 (L) 12.0 - 16.0 g/dL    Hematocrit 25.4 (L) 37.0 - 48.5 %    MCV 85 82 - 98 fL    MCH 27.3 27.0 - 31.0 pg    MCHC 32.3 32.0 - 36.0 g/dL    RDW 16.4 (H) 11.5 - 14.5 %    Platelets 103 (L) 150 - 350 K/uL    MPV 9.0 (L) 9.2 - 12.9 fL    Gran # (ANC) 8.5 (H) 1.8 - 7.7 K/uL    Lymph # 0.6 (L) 1.0 - 4.8 K/uL    Mono # 0.5 0.3 - 1.0 K/uL    Eos # 0.7 (H) 0.0 - 0.5 K/uL    Baso # 0.01 0.00 - 0.20 K/uL    Gran% 82.9 (H) 38.0 - 73.0 %    Lymph% 6.2 (L) 18.0 - 48.0 %    Mono% 4.4 4.0 - 15.0 %    Eosinophil% 6.4 0.0 - 8.0 %    Basophil% 0.1 0.0 - 1.9 %    Platelet Estimate Decreased (A)     Aniso Slight     Hypo Occasional     Large/Giant Platelets Present     Differential Method Automated        Diagnostic Results:  None new    ASSESSMENT/PLAN:     Active Hospital Problems    Diagnosis  POA    *Postoperative vaginal bleeding following genitourinary procedure [N99.820]  Yes    Vaginal bleeding [N93.9]  Yes    Non compliance w medication regimen [Z91.14]  Not Applicable    Anemia in ESRD (end-stage renal disease) [N18.6, D63.1]  Yes     Chronic    Renovascular hypertension [I15.0]  Yes     Chronic    ESRD on hemodialysis [N18.6, Z99.2]  Not Applicable     Chronic    Prophylactic immunotherapy [Z29.8]  Not  Applicable   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 [Z94.4]  Not Applicable     Chronic     hemangioendothelioma s/p LTx ()        Resolved Hospital Problems    Diagnosis Date Resolved POA    Vaginal bleeding [N93.9] 2018 Yes       Assessment: 27 y.o.  F with PMHx including ESRD on HD, poorly controlled HTN, s/p liver transplant on chronic immunosuppression, and refractory postop bleeding from LEEP, now POD#1 s/p EUA with Sturmdorf sutures. Stable.    Plan:  1. Postop bleeding after LEEP  - LEEP performed 6/15/2018, this is patient's 7th ED visit and admission for vaginal bleeding s/p LEEP  - UAE performed by IR on   - EUA with Sturmdorf sutures on   - Moderate amount of discomfort during exam, will manage pain with PO ibuprofen 600 q6h, norco 5/10's prn, and IV dilaudid for BTP  - Saturating one pad per hour  - Strict pad counts     2. Severe vaginal atrophy and desquamative inflammatory vaginitis  - on last EUA, evidence of abrasions and friability throughout the vagina which appears to be related to conditions above  - continue vaginal estrogen cream to aid in healing. Regimen: 2g nightly for 1 week then 1g nightly for 1 week, then 0.5g // nights  - also needs cleocin 2% cream, not on formulary. Pharmacy has been contacted and cream requested, we are awaiting availability of this medication.    3. Acute blood loss on chronic anemia  - H/h stable at 8/25  - VSS, asymptomatic this AM     4. HTN  - discussed uncontrolled HTN as contributing cause of repetitive re-bleeds, strongly encouraged compliance with antihypertensive regimen  - continue all home antihypertensives while inpatient  - hydralazine PRN     5. H/o liver transplant  - continue home immunosuppression regimen  - GI consulted by nephrology for management of immunosuppression     6. ESRD on HD  - receives HD //Sat, dialyzed inpatient on   - nephrology consulted, appreciate maddie Ruiz MD  OBGYN - PGY 4

## 2018-07-27 NOTE — OR NURSING
bp elevated 180/111 mmhg. Pt is normally hypertensive and on multiple meds. Dr bojorquez made aware and deacon the np for nephrology group. Orders to give hydralazine 20mg iv for sbp over 170mmhg given per deacon np. Dr bojorquez treating bp with labetalol see his notes and wanting to hold hydralazine for now.

## 2018-07-27 NOTE — PLAN OF CARE
CM met with pt to inquire about PCP. Pt states she is current with Dr.Evan Sosa and has an apt in august.    CM called MD office to ask about an earlier apt. Aug 23 is when pt is scheduled and that is the earliest available.    Pt states she is compliant with her medications.    Dispo pending GYN clearance.    No CM needs for discharge.

## 2018-07-27 NOTE — CONSULTS
Gastroenterology Consult    7/27/2018  8:23 AM    Consulting Physician:  Antoine Becerra MD    Primary Care Provider: Stan Sosa MD    Reason for consultation: Liver transplant    HPI:  Holly Patel is a 27 y.o. female who presents with post op vaginal bleeding after LEEP.  This is her fifth admission for bleeding with breakthrough after each intervention.  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  She is currently on tacrolimus 7 mg at 8 AM and 8 PM with Cellcept 1000 mg BID being followed by Dr. Pinedo at [facility] [place].      Allergies:   Review of patient's allergies indicates:   Allergen Reactions    Chloral hydrate      Other reaction(s): Hallucinations  Other reaction(s): Hives    Hydrocodone Other (See Comments)     Mental status changes       Current Medications:    Current Facility-Administered Medications:     citalopram tablet 20 mg, 20 mg, Oral, Daily, Will Decker MD, 20 mg at 07/26/18 1227    cloNIDine 0.3 mg/24 hr td ptwk 1 patch, 1 patch, Transdermal, Q7 Days, Will Decker MD    diphenhydrAMINE capsule 25 mg, 25 mg, Oral, Q4H PRN, Roberat Ruiz MD    famotidine tablet 20 mg, 20 mg, Oral, Daily, Roberta Ruiz MD    hydrALAZINE injection 20 mg, 20 mg, Intravenous, Q6H PRN, Prosper Panda MD, 20 mg at 07/26/18 1954    hydrALAZINE tablet 100 mg, 100 mg, Oral, Q8H, Will Decker MD, 100 mg at 07/27/18 0613    HYDROcodone-acetaminophen  mg per tablet 1 tablet, 1 tablet, Oral, Q4H PRN, Will Decker MD, 1 tablet at 07/27/18 0046    HYDROcodone-acetaminophen 5-325 mg per tablet 1 tablet, 1 tablet, Oral, Q4H PRN, Will Decker MD    HYDROmorphone tablet 2 mg, 2 mg, Oral, Q3H PRN, Will Decker MD    labetalol tablet 400 mg, 400 mg, Oral, Q8H, Will Decker MD, 400 mg at 07/27/18 0613    lisinopril tablet 40 mg, 40 mg, Oral, Daily, Will Decker MD     metoclopramide HCl injection 5 mg, 5 mg, Intravenous, Q6H PRN, Roberta Ruiz MD    mycophenolate capsule 1,000 mg, 1,000 mg, Oral, BID, Roberta Ruiz MD    NIFEdipine 24 hr tablet 120 mg, 120 mg, Oral, Daily, Will Decker MD, 120 mg at 18 1229    ondansetron disintegrating tablet 8 mg, 8 mg, Oral, Q8H PRN, Will Decker MD    predniSONE tablet 1 mg, 1 mg, Oral, Every other day, Roberta Ruiz MD    promethazine (PHENERGAN) 12.5 mg in dextrose 5 % 50 mL IVPB, 12.5 mg, Intravenous, Q6H PRN, Will Dceker MD    simethicone chewable tablet 80 mg, 80 mg, Oral, Q4H PRN, Roberta Ruiz MD    sodium chloride 0.9% flush 3 mL, 3 mL, Intravenous, PRN, Will Decker MD    sodium chloride 0.9% flush 3 mL, 3 mL, Intravenous, PRN, Denzel Leach MD      Social History:  Social History     Social History    Marital status: Legally      Spouse name: N/A    Number of children: N/A    Years of education: N/A     Social History Main Topics    Smoking status: Never Smoker    Smokeless tobacco: Never Used    Alcohol use No    Drug use: No    Sexual activity: No     Other Topics Concern    Are You Pregnant Or Think You May Be? No    Breast-Feeding No     Social History Narrative    Lives 2 kids, 7 and 8, and nephew. Her mom helps with kids.       Surgical History:  Past Surgical History:   Procedure Laterality Date     SECTION      x 2    CONIZATION OF CERVIX USING LOOP ELECTROSURGICAL EXCISION PROCEDURE (LEEP) N/A 6/15/2018    Procedure: CONIZATION-CERVICAL-LEEP;  Surgeon: Neelam Marroquin MD;  Location: [facility] OR;  Service: OB/GYN;  Laterality: N/A;    EMBOLIZATION N/A 2018    Procedure: EMBOLIZATION, BLOOD VESSEL;  Surgeon: Aren Ramos MD;  Location: [facility] CATH LAB;  Service: Radiology;  Laterality: N/A;    EXAMINATION UNDER ANESTHESIA N/A 2018    Procedure: Exam under anesthesia;  Surgeon: Neelam Marroquin MD;  Location: [facility]  OR;  Service: OB/GYN;  Laterality: N/A;    EXAMINATION UNDER ANESTHESIA N/A 7/9/2018    Procedure: Exam under anesthesia (ADD ON );  Surgeon: NICKIE Alvarez MD;  Location: [facility] OR;  Service: OB/GYN;  Laterality: N/A;  (ADD ON )    LIVER BIOPSY      LIVER TRANSPLANT  09/1992    PERINEORRHAPHY  6/19/2018    Procedure: SUTURE REPAIR,CERVIX;  Surgeon: Neelam Marroquin MD;  Location: [facility] OR;  Service: OB/GYN;;    TUBAL LIGATION  2010         Family History:  Family History   Problem Relation Age of Onset    Hypertension Mother     Hypertension Father     Melanoma Neg Hx     Breast cancer Neg Hx     Colon cancer Neg Hx     Ovarian cancer Neg Hx        Review of systems:   ROS      Physical Exam:  Temp:  [97.7 °F (36.5 °C)-98.8 °F (37.1 °C)] 98.2 °F (36.8 °C)  Pulse:  [] 97  Resp:  [16-20] 18  SpO2:  [93 %-100 %] 98 %  BP: (137-210)/() 160/97    General: Well developed, well nourished, female in no acute distress.  Patient is alert and oriented  Eyes:  Anicteric sclera, PERRLA  ENT:  Moist mucous membranes, no drainage from ears or nose  Neck:  Supple, no nodes or masses felt, no thyromegaly  Cardiovascular:  Regular rate and rhythm without murmur  Lungs:  Clear to auscultation with normal effort; no wheezes or rales noted  GI:  Soft, mild tenderness lower abdomen  Musculoskeletal:  No clubbing, cyanosis, or edema  Neurologic:  No focal defecits    Labs:  Results for SYEDA BANKS (MRN [id number]) as of 7/27/2018 08:28   Ref. Range 7/27/2018 05:39   WBC Latest Ref Range: 3.90 - 12.70 K/uL 10.25   RBC Latest Ref Range: 4.00 - 5.40 M/uL 3.00 (L)   Hemoglobin Latest Ref Range: 12.0 - 16.0 g/dL 8.2 (L)   Hematocrit Latest Ref Range: 37.0 - 48.5 % 25.4 (L)   MCV Latest Ref Range: 82 - 98 fL 85   MCH Latest Ref Range: 27.0 - 31.0 pg 27.3   MCHC Latest Ref Range: 32.0 - 36.0 g/dL 32.3   RDW Latest Ref Range: 11.5 - 14.5 % 16.4 (H)   Platelets Latest Ref Range: 150 - 350 K/uL 103 (L)   MPV  Latest Ref Range: 9.2 - 12.9 fL 9.0 (L)   Gran% Latest Ref Range: 38.0 - 73.0 % 82.9 (H)   Gran # (ANC) Latest Ref Range: 1.8 - 7.7 K/uL 8.5 (H)   Lymph% Latest Ref Range: 18.0 - 48.0 % 6.2 (L)   Lymph # Latest Ref Range: 1.0 - 4.8 K/uL 0.6 (L)   Mono% Latest Ref Range: 4.0 - 15.0 % 4.4   Mono # Latest Ref Range: 0.3 - 1.0 K/uL 0.5   Eosinophil% Latest Ref Range: 0.0 - 8.0 % 6.4   Eos # Latest Ref Range: 0.0 - 0.5 K/uL 0.7 (H)   Basophil% Latest Ref Range: 0.0 - 1.9 % 0.1   Baso # Latest Ref Range: 0.00 - 0.20 K/uL 0.01   Aniso Unknown Slight   Hypo Unknown Occasional   Platelet Estimate Unknown Decreased (A)   Large/Giant Platelets Unknown Present   Sodium Latest Ref Range: 136 - 145 mmol/L 136   Potassium Latest Ref Range: 3.5 - 5.1 mmol/L 4.7   Chloride Latest Ref Range: 95 - 110 mmol/L 102   CO2 Latest Ref Range: 23 - 29 mmol/L 25   Anion Gap Latest Ref Range: 8 - 16 mmol/L 9   BUN, Bld Latest Ref Range: 6 - 20 mg/dL 17   Creatinine Latest Ref Range: 0.5 - 1.4 mg/dL 4.5 (H)   eGFR if non African American Latest Ref Range: >60 mL/min/1.73 m^2 13 (A)   eGFR if African American Latest Ref Range: >60 mL/min/1.73 m^2 14 (A)   Glucose Latest Ref Range: 70 - 110 mg/dL 101   Calcium Latest Ref Range: 8.7 - 10.5 mg/dL 8.8   Phosphorus Latest Ref Range: 2.7 - 4.5 mg/dL 4.0   Albumin Latest Ref Range: 3.5 - 5.2 g/dL 2.1 (L)   Differential Method Unknown Automated       Imaging and Other Studies:  No new    Assessment:  1.  Post op bleeding after LEEP  2.  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  Continue with Prograf 7 mg at 8 AM and 7 mg at 8 PM with mycophenolate 1000 mg BID  2.  Follow up with hepatology at [facility] [place]  3.  Available as needed.    Antoine Becerra

## 2018-07-27 NOTE — TELEPHONE ENCOUNTER
----- Message from Araseli Theodore sent at 7/27/2018 10:40 AM CDT -----  Contact: Dr. Rei Minaya from EHR Case Management recommends patient to be upgraded to impatient. She can be reached at [phone].

## 2018-07-27 NOTE — ANESTHESIA POSTPROCEDURE EVALUATION
"Anesthesia Post Evaluation    Patient: Holly Patel    Procedure(s) Performed: Procedure(s) (LRB):  Exam under anesthesia -cervical suturing  (N/A)    Final Anesthesia Type: general  Patient location during evaluation: PACU  Patient participation: Yes- Able to Participate  Level of consciousness: awake and alert  Post-procedure vital signs: reviewed and stable  Pain management: adequate  Airway patency: patent  PONV status at discharge: No PONV  Anesthetic complications: no      Cardiovascular status: hemodynamically stable  Respiratory status: unassisted  Hydration status: euvolemic  Follow-up not needed.        Visit Vitals  BP (!) 162/91   Pulse 106   Temp 36.7 °C (98 °F)   Resp 20   Ht 5' 5" (1.651 m)   Wt 52.6 kg (116 lb)   LMP  (LMP Unknown)   SpO2 100%   BMI 19.30 kg/m²       Pain/Bladimir Score: Pain Assessment Performed: Yes (7/26/2018  7:46 PM)  Presence of Pain: complains of pain/discomfort (7/26/2018  7:36 PM)  Pain Rating Prior to Med Admin: 6 (7/26/2018  7:36 PM)  Pain Rating Post Med Admin: 6 (7/26/2018  7:35 PM)  Bladimir Score: 10 (7/26/2018  7:27 PM)      "

## 2018-07-28 LAB
ALBUMIN SERPL BCP-MCNC: 2.3 G/DL
ANION GAP SERPL CALC-SCNC: 10 MMOL/L
BUN SERPL-MCNC: 27 MG/DL
CALCIUM SERPL-MCNC: 8.9 MG/DL
CHLORIDE SERPL-SCNC: 102 MMOL/L
CO2 SERPL-SCNC: 24 MMOL/L
CREAT SERPL-MCNC: 6.4 MG/DL
EST. GFR  (AFRICAN AMERICAN): 9 ML/MIN/1.73 M^2
EST. GFR  (NON AFRICAN AMERICAN): 8 ML/MIN/1.73 M^2
GLUCOSE SERPL-MCNC: 102 MG/DL
PHOSPHATE SERPL-MCNC: 4.5 MG/DL
POTASSIUM SERPL-SCNC: 4.6 MMOL/L
SODIUM SERPL-SCNC: 136 MMOL/L

## 2018-07-28 PROCEDURE — 25000003 PHARM REV CODE 250: Performed by: STUDENT IN AN ORGANIZED HEALTH CARE EDUCATION/TRAINING PROGRAM

## 2018-07-28 PROCEDURE — 25000003 PHARM REV CODE 250: Performed by: INTERNAL MEDICINE

## 2018-07-28 PROCEDURE — 36415 COLL VENOUS BLD VENIPUNCTURE: CPT

## 2018-07-28 PROCEDURE — 63600175 PHARM REV CODE 636 W HCPCS: Performed by: NURSE PRACTITIONER

## 2018-07-28 PROCEDURE — 11000001 HC ACUTE MED/SURG PRIVATE ROOM

## 2018-07-28 PROCEDURE — 99900035 HC TECH TIME PER 15 MIN (STAT)

## 2018-07-28 PROCEDURE — 63600175 PHARM REV CODE 636 W HCPCS: Performed by: INTERNAL MEDICINE

## 2018-07-28 PROCEDURE — 63600175 PHARM REV CODE 636 W HCPCS: Performed by: STUDENT IN AN ORGANIZED HEALTH CARE EDUCATION/TRAINING PROGRAM

## 2018-07-28 PROCEDURE — 80069 RENAL FUNCTION PANEL: CPT

## 2018-07-28 PROCEDURE — 94761 N-INVAS EAR/PLS OXIMETRY MLT: CPT

## 2018-07-28 PROCEDURE — 25000003 PHARM REV CODE 250: Performed by: NURSE PRACTITIONER

## 2018-07-28 PROCEDURE — 80100016 HC MAINTENANCE HEMODIALYSIS

## 2018-07-28 RX ADMIN — HYDROCODONE BITARTRATE AND ACETAMINOPHEN 1 TABLET: 5; 325 TABLET ORAL at 04:07

## 2018-07-28 RX ADMIN — FAMOTIDINE 20 MG: 20 TABLET ORAL at 04:07

## 2018-07-28 RX ADMIN — MYCOPHENOLATE MOFETIL 1000 MG: 250 CAPSULE ORAL at 08:07

## 2018-07-28 RX ADMIN — LISINOPRIL 40 MG: 20 TABLET ORAL at 04:07

## 2018-07-28 RX ADMIN — METOPROLOL TARTRATE 100 MG: 50 TABLET ORAL at 04:07

## 2018-07-28 RX ADMIN — METOPROLOL TARTRATE 100 MG: 50 TABLET ORAL at 08:07

## 2018-07-28 RX ADMIN — ERYTHROPOIETIN 10000 UNITS: 10000 INJECTION, SOLUTION INTRAVENOUS; SUBCUTANEOUS at 04:07

## 2018-07-28 RX ADMIN — CITALOPRAM HYDROBROMIDE 20 MG: 20 TABLET ORAL at 04:07

## 2018-07-28 RX ADMIN — MYCOPHENOLATE MOFETIL 1000 MG: 250 CAPSULE ORAL at 04:07

## 2018-07-28 RX ADMIN — HYDRALAZINE HYDROCHLORIDE 100 MG: 25 TABLET, FILM COATED ORAL at 05:07

## 2018-07-28 RX ADMIN — CONJUGATED ESTROGENS 2 G: 0.62 CREAM VAGINAL at 08:07

## 2018-07-28 RX ADMIN — TACROLIMUS 7 MG: 1 CAPSULE ORAL at 08:07

## 2018-07-28 RX ADMIN — HYDRALAZINE HYDROCHLORIDE 100 MG: 25 TABLET, FILM COATED ORAL at 04:07

## 2018-07-28 RX ADMIN — NIFEDIPINE 60 MG: 30 TABLET, FILM COATED, EXTENDED RELEASE ORAL at 08:07

## 2018-07-28 RX ADMIN — NIFEDIPINE 60 MG: 30 TABLET, FILM COATED, EXTENDED RELEASE ORAL at 04:07

## 2018-07-28 RX ADMIN — HYDRALAZINE HYDROCHLORIDE 100 MG: 25 TABLET, FILM COATED ORAL at 09:07

## 2018-07-28 NOTE — PLAN OF CARE
Plan of care reviewed with patient and mother at bedside, medications explained. Pt currently resting comfortably in bed and appears to be sleeping in between care. VSS, bed in lowest, locked position and call light in reach. Hourly rounding performed, will continue to monitor.

## 2018-07-28 NOTE — PROGRESS NOTES
"Nephrology  Progress Note    Admit Date: 7/26/2018   LOS: 2 days     SUBJECTIVE:     Follow-up For:  ESRD/Acc HTN    Interval History:    Uneventful night.  Better BPs with higher doses of Clonidine and Metoprolol.  Seen on HD.  No complaints.  Reports mild vaginal bleeding.      Review of Systems:  Constitutional: No fever or chills  Respiratory: No cough or shortness of breath  Cardiovascular: No chest pain or palpitations  Gastrointestinal: No nausea or vomiting  Neurological: No confusion or weakness    OBJECTIVE:     Vital Signs Range (Last 24H):  BP (!) 157/92 (BP Location: Right arm, Patient Position: Lying)   Pulse 96   Temp 97.8 °F (36.6 °C) (Oral)   Resp 16   Ht 5' 5" (1.651 m)   Wt 52.6 kg (116 lb)   LMP  (LMP Unknown)   SpO2 99%   Breastfeeding? No   BMI 19.30 kg/m²     Temp:  [96.7 °F (35.9 °C)-98.5 °F (36.9 °C)]   Pulse:  [84-96]   Resp:  [16-18]   BP: (142-190)/()   SpO2:  [96 %-100 %]     I & O (Last 24H):No intake or output data in the 24 hours ending 07/28/18 0942    Physical Exam:  General appearance: Well developed, well nourished  Eyes:  Conjunctivae/corneas clear. PERRL.  Lungs: Normal respiratory effort,   clear to auscultation bilaterally   Heart: Regular rate and rhythm, S1, S2 normal, no murmur, rub or herbert.  Abdomen: Soft, non-tender non-distended; bowel sounds normal; no masses,  no organomegaly  Extremities: No cyanosis or clubbing. No edema.    Skin: Skin color, texture, turgor normal. No rashes or lesions  Neurologic: Normal strength and tone. No focal numbness or weakness  Access: E AV     Laboratory Data:  No results for input(s): WBC, RBC, HGB, HCT, PLT, MCV, MCH, MCHC in the last 24 hours.    BMP:     Recent Labs  Lab 07/28/18  0544         K 4.6      CO2 24   BUN 27*   CREATININE 6.4*   CALCIUM 8.9     Lab Results   Component Value Date    CALCIUM 8.9 07/28/2018    PHOS 4.5 07/28/2018       Lab Results   Component Value Date    PTH 1,771.8 " (H) 06/28/2018    CALCIUM 8.9 07/28/2018    CAION 0.97 (L) 01/26/2018    PHOS 4.5 07/28/2018       Lab Results   Component Value Date    URICACID 4.7 05/16/2018       BNP  No results for input(s): BNP, BNPTRIAGEBLO in the last 168 hours.    Medications:  Medication list was reviewed and changes noted under Assessment/Plan.    Diagnostic Results: Reviewed.    ASSESSMENT/PLAN:     1. ESRD/HD(N 18.6, Z 99.2, I 12.0):   - Pt seen and examined on hemodialysis.  Labs noted and dialysate adjusted.  The dialysis flow sheet and vital signs were reviewed.  Challenge UF to 2.5L.  -Discussed at length dialysis and medication adherence.  -She dialyzes at [facility] the [facility] under the care of Dr. Bass.  I spoke with Dr. Bass at length on 7/26--he confirms patient's nonadherence to medication regimen.  -Renally dose meds, avoid nephrotoxins, and monitor I/O's closely.  - Discussed low salt diet and fluid restriction.    - Avoid NSAIDs, as can increase risk of GI bleeding and raise BPs, especially in a dialysis patient.    2. Acute on Chronic Anemia of ESRD, acute blood loss anemia (D 63.1, N93.9, D50):    -stable  -Will dose Procrit once BPs controlled.  -Acute blood loss from vaginal bleeding.        3. Chronic Thrombocytopenia (D69.6)  -Coags are normal.       4. S/P Liver Transplant.(Z 94.4):   - She will need to resume immunosuppression.  GI consult note reviewed.  - Tacrolimus level undetectable on each of prior admissions.     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 HTN (I12.0, Z91.14):    - Currently on Metoprolol, clonidine patch, hydralazine, lisinopril, nifedipine.  - Hopeful UF challenge on HD today improves BPs as well.    Dispo: Have discussed at length pt's noncompliance and risks factors for death.  Stable for discharge from renal standpoint.       Prosper Panda MD  Nephrology

## 2018-07-28 NOTE — PROGRESS NOTES
Progress Note  Gynecology    Admit Date: 2018  LOS: 2    Reason for Admission:  Postoperative vaginal bleeding following genitourinary procedure    SUBJECTIVE:     Holly Patel is a 27 y.o.  F with PMHx including ESRD on HD, poorly controlled HTN, s/p liver transplant on chronic immunosuppression, and refractory postop bleeding from LEEP done 6/15/18. This is her 7th admission for postop bleeding management.    POD#2 s/p EUA/Sturmdorf sutures. No acute issues overnight. She reports spotting that fills a pad every 3-4 hours which is improved from admission, but unchanged since postop two days ago. Ambulating without difficulty. Tolerating a regular diet without nausea/vomiting. Denies fever, chills, light-headedness, blurry vision, CP, and SOB. No new complaints this AM. Patient would like to go home but still worries about the bleeding.    OBJECTIVE:     Vital Signs   Temp:  [96.7 °F (35.9 °C)-98.5 °F (36.9 °C)] 97.2 °F (36.2 °C)  Pulse:  [84-97] 84  Resp:  [16-18] 16  SpO2:  [96 %-100 %] 99 %  BP: (142-190)/() 142/94    No intake or output data in the 24 hours ending 18 0603    Physical Exam:  Gen: A&Ox3, NAD  CV: RRR  Pulm: normal respiratory effort  Abd: soft, non-distended, non-tender to palpation without rebound or guarding  Pelvic: slight amount of blood on pad - patient states she changed it within the hour  Ext: no peripheral edema, TEDs/SCDs not in place    Laboratory:  No results found for this or any previous visit (from the past 24 hour(s)).      ASSESSMENT/PLAN:     Active Hospital Problems    Diagnosis  POA    *Postoperative vaginal bleeding following genitourinary procedure [N99.820]  Yes    Vaginal bleeding [N93.9]  Yes    Non compliance w medication regimen [Z91.14]  Not Applicable    Anemia in ESRD (end-stage renal disease) [N18.6, D63.1]  Yes     Chronic    Renovascular hypertension [I15.0]  Yes     Chronic    ESRD on hemodialysis [N18.6, Z99.2]  Not  Applicable     Chronic    Prophylactic immunotherapy [Z29.8]  Not Applicable   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 [Z94.4]  Not Applicable     Chronic     hemangioendothelioma s/p LTx ()        Resolved Hospital Problems    Diagnosis Date Resolved POA    Vaginal bleeding [N93.9] 2018 Yes       Assessment: 27 y.o.  F with PMHx including ESRD on HD, poorly controlled HTN, s/p liver transplant on chronic immunosuppression, and refractory postop bleeding from LEEP, now POD#2 s/p EUA with Sturmdorf sutures. Stable.    Plan:  1. Postop bleeding after LEEP  - LEEP performed 6/15/2018, this is patient's 7th ED visit and admission for vaginal bleeding s/p LEEP  - UAE performed by IR on   - EUA with Sturmdorf sutures on   - Moderate amount of discomfort during exam, will manage pain with PO ibuprofen 600 q6h, norco 5/10's prn, and IV dilaudid for BTP  - Strict pad counts     2. Severe vaginal atrophy and desquamative inflammatory vaginitis  - on last EUA, evidence of abrasions and friability throughout the vagina which appears to be related to conditions above  - continue vaginal estrogen cream to aid in healing. Regimen: 2g nightly for 1 week then 1g nightly for 1 week, then 0.5g // nights  - also needs cleocin 2% cream, not on formulary. Pharmacy has been contacted and cream requested, we are awaiting availability of this medication.    3. Acute blood loss on chronic anemia  - H/h on  stable at 8/25  - VSS, asymptomatic this AM     4. HTN  - discussed uncontrolled HTN as contributing cause of repetitive re-bleeds, strongly encouraged compliance with antihypertensive regimen  - continue all home antihypertensives while inpatient  - hydralazine PRN     5. H/o liver transplant  - continue home immunosuppression regimen  - GI consulted by nephrology for management of immunosuppression     6. ESRD on HD  - receives HD //Sat, dialyzed inpatient on   - nephrology consulted, appreciate  recs    Dispo: As patient continues to be stable with mild bleeding, will consider discharge home today.    Trena Samano MD  OBGYN, PGY-1

## 2018-07-28 NOTE — PLAN OF CARE
Problem: Patient Care Overview  Goal: Plan of Care Review  Outcome: Ongoing (interventions implemented as appropriate)  Pt in no distress Ra, no changes at this time.

## 2018-07-29 VITALS
SYSTOLIC BLOOD PRESSURE: 138 MMHG | OXYGEN SATURATION: 100 % | TEMPERATURE: 98 F | HEIGHT: 65 IN | WEIGHT: 116 LBS | BODY MASS INDEX: 19.33 KG/M2 | HEART RATE: 84 BPM | RESPIRATION RATE: 16 BRPM | DIASTOLIC BLOOD PRESSURE: 87 MMHG

## 2018-07-29 PROBLEM — N95.2 VAGINAL ATROPHY: Status: ACTIVE | Noted: 2018-07-29

## 2018-07-29 PROBLEM — N93.9 VAGINAL BLEEDING: Status: RESOLVED | Noted: 2018-07-26 | Resolved: 2018-07-29

## 2018-07-29 LAB
ALBUMIN SERPL BCP-MCNC: 2.2 G/DL
ANION GAP SERPL CALC-SCNC: 11 MMOL/L
BUN SERPL-MCNC: 19 MG/DL
CALCIUM SERPL-MCNC: 9 MG/DL
CHLORIDE SERPL-SCNC: 98 MMOL/L
CO2 SERPL-SCNC: 27 MMOL/L
CREAT SERPL-MCNC: 4.2 MG/DL
EST. GFR  (AFRICAN AMERICAN): 16 ML/MIN/1.73 M^2
EST. GFR  (NON AFRICAN AMERICAN): 14 ML/MIN/1.73 M^2
GLUCOSE SERPL-MCNC: 97 MG/DL
PHOSPHATE SERPL-MCNC: 4 MG/DL
POTASSIUM SERPL-SCNC: 4.5 MMOL/L
SODIUM SERPL-SCNC: 136 MMOL/L

## 2018-07-29 PROCEDURE — 25000003 PHARM REV CODE 250: Performed by: STUDENT IN AN ORGANIZED HEALTH CARE EDUCATION/TRAINING PROGRAM

## 2018-07-29 PROCEDURE — 99024 POSTOP FOLLOW-UP VISIT: CPT | Mod: ,,, | Performed by: OBSTETRICS & GYNECOLOGY

## 2018-07-29 PROCEDURE — 99900035 HC TECH TIME PER 15 MIN (STAT)

## 2018-07-29 PROCEDURE — 63600175 PHARM REV CODE 636 W HCPCS: Performed by: STUDENT IN AN ORGANIZED HEALTH CARE EDUCATION/TRAINING PROGRAM

## 2018-07-29 PROCEDURE — 25000003 PHARM REV CODE 250: Performed by: NURSE PRACTITIONER

## 2018-07-29 PROCEDURE — 94761 N-INVAS EAR/PLS OXIMETRY MLT: CPT

## 2018-07-29 PROCEDURE — 80069 RENAL FUNCTION PANEL: CPT

## 2018-07-29 PROCEDURE — 36415 COLL VENOUS BLD VENIPUNCTURE: CPT

## 2018-07-29 PROCEDURE — 63600175 PHARM REV CODE 636 W HCPCS: Performed by: INTERNAL MEDICINE

## 2018-07-29 PROCEDURE — 25000003 PHARM REV CODE 250: Performed by: INTERNAL MEDICINE

## 2018-07-29 RX ORDER — CLINDAMYCIN PHOSPHATE 20 MG/G
CREAM VAGINAL
Qty: 40 G | Refills: 3 | Status: SHIPPED | OUTPATIENT
Start: 2018-07-29 | End: 2018-01-01

## 2018-07-29 RX ADMIN — NIFEDIPINE 60 MG: 30 TABLET, FILM COATED, EXTENDED RELEASE ORAL at 09:07

## 2018-07-29 RX ADMIN — MYCOPHENOLATE MOFETIL 1000 MG: 250 CAPSULE ORAL at 09:07

## 2018-07-29 RX ADMIN — HYDRALAZINE HYDROCHLORIDE 100 MG: 25 TABLET, FILM COATED ORAL at 05:07

## 2018-07-29 RX ADMIN — METOPROLOL TARTRATE 100 MG: 50 TABLET ORAL at 09:07

## 2018-07-29 RX ADMIN — FAMOTIDINE 20 MG: 20 TABLET ORAL at 09:07

## 2018-07-29 RX ADMIN — CINACALCET HYDROCHLORIDE 30 MG: 30 TABLET, COATED ORAL at 09:07

## 2018-07-29 RX ADMIN — LISINOPRIL 40 MG: 20 TABLET ORAL at 09:07

## 2018-07-29 RX ADMIN — TACROLIMUS 7 MG: 1 CAPSULE ORAL at 09:07

## 2018-07-29 RX ADMIN — CITALOPRAM HYDROBROMIDE 20 MG: 20 TABLET ORAL at 09:07

## 2018-07-29 RX ADMIN — PREDNISONE 1 MG: 1 TABLET ORAL at 09:07

## 2018-07-29 NOTE — DISCHARGE SUMMARY
Discharge Summary  Gynecology      Admit Date: 2018    Discharge Date and Time: 2018 800    Attending Physician: Lei Sims III, MD    Principal Diagnoses: Postoperative vaginal bleeding following genitourinary procedure    Active Hospital Problems    Diagnosis  POA    *Postoperative vaginal bleeding following genitourinary procedure [N99.820]  Yes    Vaginal atrophy with desquamative inflammatory vaginitis [N95.2]  Unknown    Non compliance w medication regimen [Z91.14]  Not Applicable    Anemia in ESRD (end-stage renal disease) [N18.6, D63.1]  Yes     Chronic    Renovascular hypertension [I15.0]  Yes     Chronic    ESRD on hemodialysis [N18.6, Z99.2]  Not Applicable     Chronic    Prophylactic immunotherapy [Z29.8]  Not Applicable   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 [Z94.4]  Not Applicable     Chronic     hemangioendothelioma s/p LTx ()        Resolved Hospital Problems    Diagnosis Date Resolved POA    Vaginal bleeding [N93.9] 2018 Yes    Vaginal bleeding [N93.9] 2018 Yes       Procedures: Procedure(s) (LRB):  Exam under anesthesia -cervical suturing  (N/A)    Discharged Condition: fair    HPI:  Holly Patel is a 27 y.o.  with PMHx significant for liver transplant on chronic immunosuppression, poorly controlled HTN, and ESRD on HD who presents in the ED with postop vaginal bleeding that began last night. Ms. Patel is well-known to GYN service. Patient had a LEEP on 6/15/18 and has had intermittent issues with bleeding since then. She had EUA with oversewing of anterior edge of LEEP bed on . Since then she has been admitted for management of HTN urgency on , , and . During  admission, she had heavy bleeding which was managed with monsels and vaginal packing, as her BP was too high to safely operate. Patient was discharged on 7/10/18 after having surgery on 18 for again re bleeding of the LEEP site after a HTN episode. At that time  patient had EUA with stay sutures placed and application of monsels and surgicel applied. She received a total of 2uPRBCs at that time. Patient admitted 7/20 for re-bleeding and light-headedness. UAE was performed and a total of 4u PRBCs were transfused at this visit. She was discharged on 7/23.     Today, patient states that light brown spotty discharge present on last discharge from hospital never ceased. Last night she noticed BRB per vagina with golf-ball sized clots. She states that she continues to bleed through 1 pad per hour. She presented to the ED today in lieu of attending her scheduled dialysis. She states she is dizzy when she ambulates. She denies LOC, fatigue, abdominal pain, fever, chills, CP, and SOB. Patient is anuric 2/2 ESRD.    Hospital course:  Patient underwent EUA with placement of sturmdorf sutures, and treatment of vagianl atrophy with premarin cream. She did not require transfusion. She was dialyzed x2 during this admission. Her bleeding stopped on POD#3, and she is stable for discharge with close follow up and precautions.    Consults: nephrology, GI, IR    Significant Diagnostic Studies:    Recent Labs  Lab 07/23/18  0438 07/26/18  0944 07/27/18  0539   WBC 9.45 6.95 10.25   HGB 9.6* 8.3* 8.2*   HCT 29.3* 25.4* 25.4*   MCV 84 84 85   PLT 80* 84* 103*          Disposition: Home or Self Care    Patient Instructions:   Current Discharge Medication List      START taking these medications    Details   clindamycin (CLINDESSE) 2 % vaginal cream Place 1 full applicator nightly  Qty: 40 g, Refills: 3    Associated Diagnoses: Vaginal atrophy      conjugated estrogens (PREMARIN) vaginal cream Regimen: 2g nightly for 1 week then 1g nightly for 1 week, then 0.5g M/W/F nights  Qty: 30 g, Refills: 11    Associated Diagnoses: Vaginal atrophy         CONTINUE these medications which have NOT CHANGED    Details   acetaminophen-codeine 300-30mg (TYLENOL #3) 300-30 mg Tab Take 1 tablet by mouth every 6  (six) hours as needed.  Qty: 10 tablet, Refills: 0      aspirin 81 MG Chew Take 1 tablet (81 mg total) by mouth once daily.  Refills: 0      calcitRIOL (ROCALTROL) 0.25 MCG Cap Take 1 capsule (0.25 mcg total) by mouth every Mon, Wed, Fri.  Qty: 12 capsule, Refills: 0      cinacalcet (SENSIPAR) 30 MG Tab Take 1 tablet (30 mg total) by mouth daily with breakfast.  Qty: 30 tablet, Refills: 11      citalopram (CELEXA) 20 MG tablet TAKE 1 TABLET BY MOUTH EVERY DAY  Qty: 30 tablet, Refills: 1      cloNIDine 0.3 mg/24 hr td ptwk (CATAPRES) 0.3 mg/24 hr Place 1 patch onto the skin every 7 days.  Qty: 4 patch, Refills: 11    Associated Diagnoses: Renovascular hypertension      famotidine (PEPCID) 40 MG tablet Take 0.5 tablets (20 mg total) by mouth once daily.  Qty: 15 tablet, Refills: 11    Associated Diagnoses: Hematemesis with nausea      food supplemt, lactose-reduced (ENSURE ACTIVE HIGH PROTEIN) Liqd Take 236 mLs by mouth 2 (two) times daily.  Qty: 60 Can, Refills: 6    Associated Diagnoses: Liver replaced by transplant; ESRD on hemodialysis; Iron deficiency anemia secondary to inadequate dietary iron intake; Moderate protein-calorie malnutrition      hydrALAZINE (APRESOLINE) 100 MG tablet Take 1 tablet (100 mg total) by mouth every 8 (eight) hours.  Qty: 90 tablet, Refills: 0      labetalol (NORMODYNE) 200 MG tablet Take 2 tablets (400 mg total) by mouth every 8 (eight) hours.  Qty: 360 tablet, Refills: 11    Associated Diagnoses: Hypertensive emergency; Uncontrolled hypertension; Renovascular hypertension      lisinopril (PRINIVIL,ZESTRIL) 40 MG tablet Take 1 tablet (40 mg total) by mouth once daily.  Qty: 30 tablet, Refills: 0      mycophenolate (CELLCEPT) 250 mg Cap Take 1,000 mg by mouth 2 (two) times daily.      NIFEdipine (PROCARDIA-XL) 60 MG (OSM) 24 hr tablet Take 2 tablets (120 mg total) by mouth once daily.  Qty: 60 tablet, Refills: 11      ondansetron (ZOFRAN) 4 MG tablet Take 1 tablet (4 mg total) by mouth  every 6 (six) hours as needed for Nausea.  Qty: 15 tablet, Refills: 0      predniSONE (DELTASONE) 1 MG tablet Take 1 mg by mouth every other day.      tacrolimus (PROGRAF) 1 MG Cap Take 7 capsules (7 mg total) by mouth every 12 (twelve) hours.  Qty: 420 capsule, Refills: 11    Associated Diagnoses: Liver transplanted      triamcinolone acetonide 0.1% (KENALOG) 0.1 % ointment AAA on arms, legs, and neck bid x 1-2 wks then prn flares only  Qty: 80 g, Refills: 3    Associated Diagnoses: Atopic dermatitis      vitamin renal formula, B-complex-vitamin c-folic acid, (NEPHROCAP) 1 mg Cap Take 1 capsule by mouth once daily.  Qty: 30 capsule, Refills: 0               Discharge Procedure Orders  Diet renal     Other restrictions (specify):   Order Comments: Pelvic rest until follow up visit. Nothing in vagina -no sex, tampons, douching, etc.     Notify your health care provider if you experience any of the following:  temperature >100.4     Notify your health care provider if you experience any of the following:  persistent nausea and vomiting or diarrhea     Notify your health care provider if you experience any of the following:  severe uncontrolled pain     Notify your health care provider if you experience any of the following:   Order Comments: Heavy vaginal bleeding saturating more than 1 pad per hr for at least consecutive 2 hrs.     No dressing needed     Activity as tolerated         Follow-up Information     Schedule an appointment as soon as possible for a visit with Lei Sims Iii, MD.    Specialties:  Obstetrics, Obstetrics and Gynecology  Why:  follow up visit in 2-3 weeks  Contact information:  [address] [id number] [phone]                   Roberta Ruiz MD  OBGYN - PGY 4

## 2018-07-29 NOTE — PROGRESS NOTES
Progress Note  Gynecology    Admit Date: 2018  LOS: 3    Reason for Admission:  Postoperative vaginal bleeding following genitourinary procedure    SUBJECTIVE:     Holly Patel is a 27 y.o.  F with PMHx including ESRD on HD, poorly controlled HTN, s/p liver transplant on chronic immunosuppression, and refractory postop bleeding from LEEP done 6/15/18. This is her 7th admission for postop bleeding management.    POD#3 s/p EUA/Sturmdorf sutures. No acute issues overnight. Reports bleeding has stopped completely. Ambulating without difficulty. Tolerating a regular diet without nausea/vomiting. Denies fever, chills, light-headedness, blurry vision, CP, and SOB. No new complaints this AM. Patient would like to go home.    OBJECTIVE:     Vital Signs   Temp:  [97.8 °F (36.6 °C)-99.3 °F (37.4 °C)] 98.7 °F (37.1 °C)  Pulse:  [] 86  Resp:  [18-20] 20  SpO2:  [96 %-100 %] 98 %  BP: (139-170)/() 139/99      Intake/Output Summary (Last 24 hours) at 18 0612  Last data filed at 18 0500   Gross per 24 hour   Intake              620 ml   Output             2600 ml   Net            -1980 ml       Physical Exam:  Gen: A&Ox3, NAD  CV: RRR  Pulm: normal respiratory effort  Abd: soft, non-distended, non-tender to palpation without rebound or guarding  Pelvic: no bleeding noted  Ext: no peripheral edema, TEDs/SCDs not in place    Laboratory:    Recent Labs  Lab 18  0944 18  0539 18  0544    136 136   K 3.9 4.7 4.6    102 102   CO2 26 25 24   BUN 29* 17 27*   CREATININE 7.1* 4.5* 6.4*   * 101 102   PHOS  --  4.0 4.5     Renal panel pending this AM    ASSESSMENT/PLAN:     Active Hospital Problems    Diagnosis  POA    *Postoperative vaginal bleeding following genitourinary procedure [N99.820]  Yes    Vaginal bleeding [N93.9]  Yes    Non compliance w medication regimen [Z91.14]  Not Applicable    Anemia in ESRD (end-stage renal disease) [N18.6, D63.1]  Yes      Chronic    Renovascular hypertension [I15.0]  Yes     Chronic    ESRD on hemodialysis [N18.6, Z99.2]  Not Applicable     Chronic    Prophylactic immunotherapy [Z29.8]  Not Applicable   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 [Z94.4]  Not Applicable     Chronic     hemangioendothelioma s/p LTx ()        Resolved Hospital Problems    Diagnosis Date Resolved POA    Vaginal bleeding [N93.9] 2018 Yes       Assessment: 27 y.o.  F with PMHx including ESRD on HD, poorly controlled HTN, s/p liver transplant on chronic immunosuppression, and refractory postop bleeding from LEEP, now POD#3 s/p EUA with Sturmdorf sutures. Improved.    Plan:  1. Postop bleeding after LEEP  - LEEP performed 6/15/2018, this is patient's 7th ED visit and admission for vaginal bleeding s/p LEEP  - UAE performed by IR on   - EUA with Sturmdorf sutures on   - bleeding resolved  - continue premarin cream and add cleocin cream nightly  - stable for discharge today  - reviewed bleeding precautions and importance of compliance with medication and HD regimens     2. Severe vaginal atrophy and desquamative inflammatory vaginitis  - on last EUA, evidence of abrasions and friability throughout the vagina which appears to be related to conditions above  - continue vaginal estrogen cream to aid in healing. Regimen: 2g nightly for 1 week then 1g nightly for 1 week, then 0.5g M// nights  - also needs cleocin 2% cream, not on formulary. Pharmacy has been contacted and cream requested, we are awaiting availability of this medication.    3. Acute blood loss on chronic anemia  - H/h on  stable at   - VSS, asymptomatic this AM     4. HTN  - discussed uncontrolled HTN as contributing cause of repetitive re-bleeds, strongly encouraged compliance with antihypertensive regimen  - continue all home antihypertensives while inpatient  - hydralazine PRN     5. H/o liver transplant  - continue home immunosuppression regimen  - GI consulted by  nephrology for management of immunosuppression     6. ESRD on HD  - receives HD Tu/Th/Sat, dialyzed inpatient on 7/26  - nephrology consulted, appreciate recs    Dispo: discharge home today as bleeding is resolved.    Roberta Ruiz MD  OBGYN - PGY 4

## 2018-07-29 NOTE — PLAN OF CARE
07/29/18 0901   Medicare Message   Important Message from Medicare regarding Discharge Appeal Rights Given to patient/caregiver;Explained to patient/caregiver;Signed/date by patient/caregiver   Date IMM was signed 07/28/18   Time IMM was signed [id number]

## 2018-07-29 NOTE — PLAN OF CARE
07/29/18 0901   Final Note   Assessment Type Final Discharge Note   Discharge Disposition Home   What phone number can be called within the next 1-3 days to see how you are doing after discharge? [phone]   Discharge plans and expectations educations in teach back method with documentation complete? Yes   Referral to / orders for Home Health Complete? n/a   Right Care Referral Info   Post Acute Recommendation No Care

## 2018-07-29 NOTE — NURSING
Reviewed medications and discharge paperwork with pt. Pt verbalized understanding & has no questions at this time. Brought to the [facility] family  via transportation.

## 2018-07-29 NOTE — PROGRESS NOTES
"Nephrology  Progress Note    Admit Date: 7/26/2018   LOS: 3 days     SUBJECTIVE:     Follow-up For:  ESRD/Acc HTN    Interval History:    Uneventful night.  Better BPs with higher doses of Clonidine and Metoprolol.  No complaints.  Reports vaginal bleeding has stopped.  Jose's at the bedside!!!    Review of Systems:  Constitutional: No fever or chills  Respiratory: No cough or shortness of breath  Cardiovascular: No chest pain or palpitations  Gastrointestinal: No nausea or vomiting  Neurological: No confusion or weakness    OBJECTIVE:     Vital Signs Range (Last 24H):  BP (!) 140/85 (BP Location: Right arm, Patient Position: Lying)   Pulse 84   Temp 98.6 °F (37 °C) (Oral)   Resp 17   Ht 5' 5" (1.651 m)   Wt 52.6 kg (116 lb)   LMP  (LMP Unknown)   SpO2 100%   Breastfeeding? No   BMI 19.30 kg/m²     Temp:  [97.9 °F (36.6 °C)-99.3 °F (37.4 °C)]   Pulse:  []   Resp:  [17-20]   BP: (139-170)/()   SpO2:  [96 %-100 %]     I & O (Last 24H):    Intake/Output Summary (Last 24 hours) at 07/29/18 0946  Last data filed at 07/29/18 0500   Gross per 24 hour   Intake              620 ml   Output             2600 ml   Net            -1980 ml       Physical Exam:  General appearance: Well developed, well nourished  Eyes:  Conjunctivae/corneas clear. PERRL.  Lungs: Normal respiratory effort,   clear to auscultation bilaterally   Heart: Regular rate and rhythm, S1, S2 normal, no murmur, rub or herbert.  Abdomen: Soft, non-tender non-distended; bowel sounds normal; no masses,  no organomegaly  Extremities: No cyanosis or clubbing. No edema.    Skin: Skin color, texture, turgor normal. No rashes or lesions  Neurologic: Normal strength and tone. No focal numbness or weakness  Access: E AVF     Laboratory Data:  No results for input(s): WBC, RBC, HGB, HCT, PLT, MCV, MCH, MCHC in the last 24 hours.    BMP:     Recent Labs  Lab 07/29/18  0436   GLU 97      K 4.5   CL 98   CO2 27   BUN 19   CREATININE 4.2* "   CALCIUM 9.0     Lab Results   Component Value Date    CALCIUM 9.0 07/29/2018    PHOS 4.0 07/29/2018       Lab Results   Component Value Date    PTH 1,771.8 (H) 06/28/2018    CALCIUM 9.0 07/29/2018    CAION 0.97 (L) 01/26/2018    PHOS 4.0 07/29/2018       Lab Results   Component Value Date    URICACID 4.7 05/16/2018       BNP  No results for input(s): BNP, BNPTRIAGEBLO in the last 168 hours.    Medications:  Medication list was reviewed and changes noted under Assessment/Plan.    Diagnostic Results: Reviewed.    ASSESSMENT/PLAN:     1. ESRD/HD(N 18.6, Z 99.2, I 12.0):   - Volume and electrolytes acceptable.  -Discussed at length dialysis and medication adherence on a daily basis.  -She dialyzes at [facility] the [facility] under the care of Dr. Bass.  I spoke with Dr. Bass at length on 7/26--he confirms patient's nonadherence to medication regimen.  -Renally dose meds, avoid nephrotoxins, and monitor I/O's closely.  - Discussed low salt diet and fluid restriction.    - Avoid NSAIDs, as can increase risk of GI bleeding and raise BPs, especially in a dialysis patient.    2. Acute on Chronic Anemia of ESRD, acute blood loss anemia (D 63.1, N93.9, D50):    - Resume Procrit at outpatient unit now that BPs are better controlled.  -Acute blood loss from vaginal bleeding.        3. Chronic Thrombocytopenia (D69.6)  -Coags are normal.       4. S/P Liver Transplant.(Z 94.4):   - She will need to resume immunosuppression.  GI consult note reviewed.  - Tacrolimus level undetectable on each of prior admissions.     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 HTN (I12.0, Z91.14):    - Currently on Metoprolol, clonidine patch, hydralazine, lisinopril, nifedipine.  - Hopeful UF challenge on HD today improves BPs as well.    Dispo: Have discussed at length pt's noncompliance and risks factors for death.  Stable for discharge from renal standpoint.  She knows to go to  outpatient HD on Tuesday at [facility].       Prosper Panda MD  Nephrology

## 2018-07-30 PROBLEM — N95.2 VAGINAL ATROPHY: Status: ACTIVE | Noted: 2018-07-30

## 2018-07-30 LAB
BLD PROD TYP BPU: NORMAL
BLD PROD TYP BPU: NORMAL
BLOOD UNIT EXPIRATION DATE: NORMAL
BLOOD UNIT EXPIRATION DATE: NORMAL
BLOOD UNIT TYPE CODE: 7300
BLOOD UNIT TYPE CODE: 7300
BLOOD UNIT TYPE: NORMAL
BLOOD UNIT TYPE: NORMAL
CODING SYSTEM: NORMAL
CODING SYSTEM: NORMAL
DISPENSE STATUS: NORMAL
DISPENSE STATUS: NORMAL
TRANS ERYTHROCYTES VOL PATIENT: NORMAL ML
TRANS ERYTHROCYTES VOL PATIENT: NORMAL ML

## 2018-08-07 ENCOUNTER — HOSPITAL ENCOUNTER (INPATIENT)
Facility: HOSPITAL | Age: 28
LOS: 9 days | Discharge: HOME | DRG: 919 | End: 2018-08-16
Attending: EMERGENCY MEDICINE | Admitting: EMERGENCY MEDICINE
Payer: MEDICARE

## 2018-08-07 ENCOUNTER — ANESTHESIA EVENT (OUTPATIENT)
Dept: SURGERY | Facility: HOSPITAL | Age: 28
DRG: 919 | End: 2018-08-07
Payer: MEDICARE

## 2018-08-07 DIAGNOSIS — N93.9 VAGINAL BLEEDING: ICD-10-CM

## 2018-08-07 DIAGNOSIS — I10 HYPERTENSION, UNSPECIFIED TYPE: ICD-10-CM

## 2018-08-07 DIAGNOSIS — Z29.89 PROPHYLACTIC IMMUNOTHERAPY: ICD-10-CM

## 2018-08-07 DIAGNOSIS — Z94.4 LIVER REPLACED BY TRANSPLANT: Chronic | ICD-10-CM

## 2018-08-07 DIAGNOSIS — D63.1 ANEMIA IN ESRD (END-STAGE RENAL DISEASE): Chronic | ICD-10-CM

## 2018-08-07 DIAGNOSIS — D64.9 ANEMIA, UNSPECIFIED TYPE: ICD-10-CM

## 2018-08-07 DIAGNOSIS — N18.6 ESRD (END STAGE RENAL DISEASE) ON DIALYSIS: ICD-10-CM

## 2018-08-07 DIAGNOSIS — I15.0 RENOVASCULAR HYPERTENSION: Chronic | ICD-10-CM

## 2018-08-07 DIAGNOSIS — Z99.2 ESRD (END STAGE RENAL DISEASE) ON DIALYSIS: ICD-10-CM

## 2018-08-07 DIAGNOSIS — N99.820 POSTOPERATIVE VAGINAL BLEEDING FOLLOWING GENITOURINARY PROCEDURE: ICD-10-CM

## 2018-08-07 DIAGNOSIS — N18.6 ANEMIA IN ESRD (END-STAGE RENAL DISEASE): Chronic | ICD-10-CM

## 2018-08-07 DIAGNOSIS — D62 ACUTE BLOOD LOSS ANEMIA: Primary | ICD-10-CM

## 2018-08-07 LAB
ABO + RH BLD: NORMAL
ALBUMIN SERPL BCP-MCNC: 2.5 G/DL
ALP SERPL-CCNC: 663 U/L
ALT SERPL W/O P-5'-P-CCNC: 33 U/L
ANION GAP SERPL CALC-SCNC: 10 MMOL/L
AST SERPL-CCNC: 43 U/L
BASOPHILS # BLD AUTO: 0.01 K/UL
BASOPHILS NFR BLD: 0.2 %
BILIRUB SERPL-MCNC: 0.7 MG/DL
BLD GP AB SCN CELLS X3 SERPL QL: NORMAL
BLD PROD TYP BPU: NORMAL
BLOOD UNIT EXPIRATION DATE: NORMAL
BLOOD UNIT TYPE CODE: 7300
BLOOD UNIT TYPE: NORMAL
BUN SERPL-MCNC: 48 MG/DL
CALCIUM SERPL-MCNC: 9 MG/DL
CHLORIDE SERPL-SCNC: 103 MMOL/L
CO2 SERPL-SCNC: 26 MMOL/L
CODING SYSTEM: NORMAL
CREAT SERPL-MCNC: 8.4 MG/DL
DIFFERENTIAL METHOD: ABNORMAL
DISPENSE STATUS: NORMAL
EOSINOPHIL # BLD AUTO: 0.4 K/UL
EOSINOPHIL NFR BLD: 8.5 %
ERYTHROCYTE [DISTWIDTH] IN BLOOD BY AUTOMATED COUNT: 15.8 %
EST. GFR  (AFRICAN AMERICAN): 6.8 ML/MIN/1.73 M^2
EST. GFR  (NON AFRICAN AMERICAN): 5.9 ML/MIN/1.73 M^2
GLUCOSE SERPL-MCNC: 90 MG/DL
HCT VFR BLD AUTO: 19.6 %
HGB BLD-MCNC: 6.1 G/DL
IMM GRANULOCYTES # BLD AUTO: 0.02 K/UL
IMM GRANULOCYTES NFR BLD AUTO: 0.5 %
INR PPP: 1.1
LYMPHOCYTES # BLD AUTO: 0.5 K/UL
LYMPHOCYTES NFR BLD: 11.6 %
MCH RBC QN AUTO: 27.2 PG
MCHC RBC AUTO-ENTMCNC: 31.1 G/DL
MCV RBC AUTO: 88 FL
MONOCYTES # BLD AUTO: 0.2 K/UL
MONOCYTES NFR BLD: 3.9 %
NEUTROPHILS # BLD AUTO: 3.1 K/UL
NEUTROPHILS NFR BLD: 75.3 %
NRBC BLD-RTO: 0 /100 WBC
PLATELET # BLD AUTO: 72 K/UL
PMV BLD AUTO: 10 FL
POTASSIUM SERPL-SCNC: 4.2 MMOL/L
PROT SERPL-MCNC: 7 G/DL
PROTHROMBIN TIME: 11.7 SEC
RBC # BLD AUTO: 2.24 M/UL
SODIUM SERPL-SCNC: 139 MMOL/L
TRANS ERYTHROCYTES VOL PATIENT: NORMAL ML
WBC # BLD AUTO: 4.13 K/UL

## 2018-08-07 PROCEDURE — 99285 EMERGENCY DEPT VISIT HI MDM: CPT | Mod: 25

## 2018-08-07 PROCEDURE — 93010 ELECTROCARDIOGRAM REPORT: CPT | Mod: ,,, | Performed by: INTERNAL MEDICINE

## 2018-08-07 PROCEDURE — 85025 COMPLETE CBC W/AUTO DIFF WBC: CPT

## 2018-08-07 PROCEDURE — 25000003 PHARM REV CODE 250: Performed by: PHYSICIAN ASSISTANT

## 2018-08-07 PROCEDURE — 63600175 PHARM REV CODE 636 W HCPCS: Performed by: HOSPITALIST

## 2018-08-07 PROCEDURE — 93005 ELECTROCARDIOGRAM TRACING: CPT

## 2018-08-07 PROCEDURE — 25000003 PHARM REV CODE 250: Performed by: HOSPITALIST

## 2018-08-07 PROCEDURE — 99285 EMERGENCY DEPT VISIT HI MDM: CPT | Mod: ,,, | Performed by: EMERGENCY MEDICINE

## 2018-08-07 PROCEDURE — P9021 RED BLOOD CELLS UNIT: HCPCS

## 2018-08-07 PROCEDURE — 80053 COMPREHEN METABOLIC PANEL: CPT

## 2018-08-07 PROCEDURE — 99232 SBSQ HOSP IP/OBS MODERATE 35: CPT | Mod: ,,, | Performed by: NURSE PRACTITIONER

## 2018-08-07 PROCEDURE — 86920 COMPATIBILITY TEST SPIN: CPT

## 2018-08-07 PROCEDURE — 99223 1ST HOSP IP/OBS HIGH 75: CPT | Mod: AI,,, | Performed by: HOSPITALIST

## 2018-08-07 PROCEDURE — 86901 BLOOD TYPING SEROLOGIC RH(D): CPT

## 2018-08-07 PROCEDURE — 11000001 HC ACUTE MED/SURG PRIVATE ROOM

## 2018-08-07 PROCEDURE — 85610 PROTHROMBIN TIME: CPT

## 2018-08-07 PROCEDURE — 36430 TRANSFUSION BLD/BLD COMPNT: CPT

## 2018-08-07 RX ORDER — LISINOPRIL 20 MG/1
40 TABLET ORAL DAILY
Status: DISCONTINUED | OUTPATIENT
Start: 2018-08-08 | End: 2018-08-14

## 2018-08-07 RX ORDER — GLUCAGON 1 MG
1 KIT INJECTION
Status: DISCONTINUED | OUTPATIENT
Start: 2018-08-07 | End: 2018-08-16 | Stop reason: HOSPADM

## 2018-08-07 RX ORDER — ONDANSETRON 4 MG/1
4 TABLET, FILM COATED ORAL EVERY 6 HOURS PRN
Status: DISCONTINUED | OUTPATIENT
Start: 2018-08-07 | End: 2018-08-16 | Stop reason: HOSPADM

## 2018-08-07 RX ORDER — ACETAMINOPHEN 325 MG/1
650 TABLET ORAL
Status: COMPLETED | OUTPATIENT
Start: 2018-08-07 | End: 2018-08-07

## 2018-08-07 RX ORDER — NIFEDIPINE 30 MG/1
120 TABLET, EXTENDED RELEASE ORAL
Status: COMPLETED | OUTPATIENT
Start: 2018-08-07 | End: 2018-08-07

## 2018-08-07 RX ORDER — LABETALOL 100 MG/1
400 TABLET, FILM COATED ORAL
Status: COMPLETED | OUTPATIENT
Start: 2018-08-07 | End: 2018-08-07

## 2018-08-07 RX ORDER — HYDRALAZINE HYDROCHLORIDE 50 MG/1
100 TABLET, FILM COATED ORAL EVERY 8 HOURS
Status: DISCONTINUED | OUTPATIENT
Start: 2018-08-07 | End: 2018-08-14

## 2018-08-07 RX ORDER — CINACALCET 30 MG/1
30 TABLET, FILM COATED ORAL
Status: DISCONTINUED | OUTPATIENT
Start: 2018-08-09 | End: 2018-08-16 | Stop reason: HOSPADM

## 2018-08-07 RX ORDER — IBUPROFEN 200 MG
16 TABLET ORAL
Status: DISCONTINUED | OUTPATIENT
Start: 2018-08-07 | End: 2018-08-16 | Stop reason: HOSPADM

## 2018-08-07 RX ORDER — CLONIDINE 0.3 MG/24H
1 PATCH, EXTENDED RELEASE TRANSDERMAL
Status: DISCONTINUED | OUTPATIENT
Start: 2018-08-08 | End: 2018-08-16 | Stop reason: HOSPADM

## 2018-08-07 RX ORDER — IBUPROFEN 200 MG
24 TABLET ORAL
Status: DISCONTINUED | OUTPATIENT
Start: 2018-08-07 | End: 2018-08-16 | Stop reason: HOSPADM

## 2018-08-07 RX ORDER — FAMOTIDINE 20 MG/1
20 TABLET, FILM COATED ORAL DAILY
Status: DISCONTINUED | OUTPATIENT
Start: 2018-08-08 | End: 2018-08-16 | Stop reason: HOSPADM

## 2018-08-07 RX ORDER — LABETALOL 200 MG/1
400 TABLET, FILM COATED ORAL EVERY 8 HOURS
Status: DISCONTINUED | OUTPATIENT
Start: 2018-08-07 | End: 2018-08-14

## 2018-08-07 RX ORDER — MYCOPHENOLATE MOFETIL 250 MG/1
1000 CAPSULE ORAL 2 TIMES DAILY
Status: DISCONTINUED | OUTPATIENT
Start: 2018-08-07 | End: 2018-08-08

## 2018-08-07 RX ORDER — HYDROCODONE BITARTRATE AND ACETAMINOPHEN 500; 5 MG/1; MG/1
TABLET ORAL
Status: DISCONTINUED | OUTPATIENT
Start: 2018-08-07 | End: 2018-08-16 | Stop reason: HOSPADM

## 2018-08-07 RX ORDER — HYDRALAZINE HYDROCHLORIDE 25 MG/1
100 TABLET, FILM COATED ORAL
Status: COMPLETED | OUTPATIENT
Start: 2018-08-07 | End: 2018-08-07

## 2018-08-07 RX ORDER — NIFEDIPINE 30 MG/1
120 TABLET, EXTENDED RELEASE ORAL DAILY
Status: DISCONTINUED | OUTPATIENT
Start: 2018-08-08 | End: 2018-08-16 | Stop reason: HOSPADM

## 2018-08-07 RX ORDER — SODIUM CHLORIDE 0.9 % (FLUSH) 0.9 %
5 SYRINGE (ML) INJECTION
Status: DISCONTINUED | OUTPATIENT
Start: 2018-08-07 | End: 2018-08-16 | Stop reason: HOSPADM

## 2018-08-07 RX ORDER — PREDNISONE 1 MG/1
1 TABLET ORAL EVERY OTHER DAY
Status: DISCONTINUED | OUTPATIENT
Start: 2018-08-08 | End: 2018-08-16 | Stop reason: HOSPADM

## 2018-08-07 RX ORDER — ACETAMINOPHEN 325 MG/1
650 TABLET ORAL EVERY 6 HOURS PRN
Status: DISCONTINUED | OUTPATIENT
Start: 2018-08-07 | End: 2018-08-16 | Stop reason: HOSPADM

## 2018-08-07 RX ORDER — OXYCODONE HYDROCHLORIDE 5 MG/1
5 TABLET ORAL EVERY 6 HOURS PRN
Status: DISCONTINUED | OUTPATIENT
Start: 2018-08-07 | End: 2018-08-08

## 2018-08-07 RX ORDER — LISINOPRIL 20 MG/1
40 TABLET ORAL
Status: COMPLETED | OUTPATIENT
Start: 2018-08-07 | End: 2018-08-07

## 2018-08-07 RX ORDER — CITALOPRAM 20 MG/1
20 TABLET, FILM COATED ORAL DAILY
Status: DISCONTINUED | OUTPATIENT
Start: 2018-08-08 | End: 2018-08-16 | Stop reason: HOSPADM

## 2018-08-07 RX ADMIN — HYDRALAZINE HYDROCHLORIDE 100 MG: 25 TABLET, FILM COATED ORAL at 08:08

## 2018-08-07 RX ADMIN — ACETAMINOPHEN 650 MG: 325 TABLET, FILM COATED ORAL at 09:08

## 2018-08-07 RX ADMIN — MYCOPHENOLATE MOFETIL 1000 MG: 250 CAPSULE ORAL at 10:08

## 2018-08-07 RX ADMIN — HYDRALAZINE HYDROCHLORIDE 100 MG: 50 TABLET ORAL at 08:08

## 2018-08-07 RX ADMIN — NIFEDIPINE 120 MG: 30 TABLET, FILM COATED, EXTENDED RELEASE ORAL at 08:08

## 2018-08-07 RX ADMIN — OXYCODONE HYDROCHLORIDE 5 MG: 5 TABLET ORAL at 07:08

## 2018-08-07 RX ADMIN — LABETALOL HYDROCHLORIDE 400 MG: 100 TABLET, FILM COATED ORAL at 08:08

## 2018-08-07 RX ADMIN — TACROLIMUS 7 MG: 5 CAPSULE ORAL at 06:08

## 2018-08-07 RX ADMIN — LABETALOL HCL 400 MG: 200 TABLET, FILM COATED ORAL at 08:08

## 2018-08-07 RX ADMIN — LISINOPRIL 40 MG: 20 TABLET ORAL at 08:08

## 2018-08-07 NOTE — HPI
Ms. Patel is a 28 yo AAF with ESRD, HTN, liver transplant in 2012 who presents to [facility] for evaluation of vaginal bleeding.  She had a LEEP procedure in June 2018 and has been having vaginal bleeding since then.  She has been admitted between [facility] and [facility] 8 times since procedure for same complaint.  She was most recently admitted to [facility] 7/26-7/29 and was seen by gynecology .  She had EUA with Sturmdorf sutures placed on the 26th with improvement in her vaginal bleeding and later discharged on the 29th.  According to records, Gynecology believes the recurrent bleeding is 2/2 uncontrolled hypertension as patient is known to be non-complaint with diet and medication adherance.  In the ED, she was noted to have hypertensive urgency with BP of 228/140.  Gynecology has been consulted in the ED.  She reports saturating 2 pads hourly at the moment with bright red blood and clots reported.  She denies abdominal cramping, light-headedness, dizziness, Nausea or vomiting.  She reports compliance with her HD prescription, but missed today as she presented to the ED for evaluation.  She is HDS on exam, administered a unit of PRBC in the ED for a hgb of 6.1.  Nephrology was consulted for ESRD management.    She dialyzes at [facility] under the care of Dr. Bass.  She dialyzes for 3.5 hours and reports an EDW ~ 50 kg.  She has an AVF to her LFA.  She no longer makes urine.

## 2018-08-07 NOTE — ED NOTES
The patient is awake, alert and cooperative with a calm affect, patient is aware of environment. Airway is open and patent, respirations are spontaneous, normal respiratory effort and rate noted, skin warm and dry, moves all extremities well, appearance: no apparent distress noted. Side rails x 2. Call bell within reach. Will continue to monitor. Pt updated on the admit status.

## 2018-08-07 NOTE — HPI
The patient is a 26 y/o female with PMH of ESRD on HD MWF, h/o liver transplant on immunosuppression with prograf and prednisone, persistent vaginal bleeding after LEEP procedure in June 2018, poorly controlle HTN, and diastolic HF, and anemia from blood loss. She had had a recent admit for same about a month ago. She underwent LEEP procedure in June 2018 but despite evaluation by GYN she continues to bleeding. Since her last discharge from [facility] [place] she was admitted under gyn service at the end of July and DC summary per Dr. Sims in GYN as per below:    Patient underwent EUA with placement of sturmdorf sutures, and treatment of vagianl atrophy with premarin cream. She did not require transfusion. She was dialyzed x2 during this admission. Her bleeding stopped on POD#3, and she is stable for discharge with close follow up and precautions.  She often requires multiple transfusions and BP control has always been uncontrolled.     She reported that she continued to bleed since then and just could not continue with the bleeding and that is why she is presenting today. She saturates two pads per hour but denies any sob, cp, dizziness or LOC. She reports compliance with fluids and salt and all of her meds.

## 2018-08-07 NOTE — H&P
[facility] Hospital Medicine  History & Physical    Patient Name: Holly Patel  MRN: [id number]  Admission Date: 8/7/2018  Attending Physician: Zeenat Almanza MD  Primary Care Provider: Stan Soas MD    [facility] Medicine Team: [facility] Zeenat Almanza MD     Patient information was obtained from patient, relative(s) and ER records.     Subjective:     Principal Problem:Acute blood loss anemia    Chief Complaint:   Chief Complaint   Patient presents with    Vaginal Bleeding     LEEP procedure on June 15th and still bleeding from it/ pt reports going through 2 pads per hour    Hypertension        HPI: The patient is a 28 y/o female with PMH of ESRD on HD MWF, h/o liver transplant on immunosuppression with prograf and prednisone, persistent vaginal bleeding after LEEP procedure in June 2018, poorly controlle HTN, and diastolic HF, and anemia from blood loss. She had had a recent admit for same about a month ago. She underwent LEEP procedure in June 2018 but despite evaluation by GYN she continues to bleeding. Since her [place] [facility] [place] was admitted under gyn service at the end of July and DC summary per Dr. Sims in GYN as per below:    Patient underwent EUA with placement of sturmdorf sutures, and treatment of vagianl atrophy with premarin cream. She did not require transfusion. She was dialyzed x2 during this admission. Her bleeding stopped on POD#3, and she is stable for discharge with close follow up and precautions.  She often requires multiple transfusions and BP control has always been uncontrolled.     She reported that she continued to bleed since then and just could not continue with the bleeding and that is why she is presenting today. She saturates two pads per hour but denies any sob, cp, dizziness or LOC. She reports compliance with fluids and salt and all of her meds.           Past Medical History:   Diagnosis Date    Anemia in ESRD (end-stage renal  disease) 10/12/2015    dialysis es, thursday, sat; access left arm    Chronic rejection of liver transplant 3/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 ()    Moderate protein-calorie malnutrition 2017    MRSA bacteremia 2017    Pneumonia     Prophylactic immunotherapy 2014    Renovascular hypertension 10/2/2015    Secondary hyperparathyroidism 2017    Seizures     Sialadenitis 3/21/2018    Thrombocytopenia 2016       Past Surgical History:   Procedure Laterality Date     SECTION      x 2    CONIZATION OF CERVIX USING LOOP ELECTROSURGICAL EXCISION PROCEDURE (LEEP) N/A 6/15/2018    Procedure: CONIZATION-CERVICAL-LEEP;  Surgeon: Neelam Marroquin MD;  Location: [facility];  Service: OB/GYN;  Laterality: N/A;    EMBOLIZATION N/A 2018    Procedure: EMBOLIZATION, BLOOD VESSEL;  Surgeon: Aren Ramos MD;  Location: [facility] [place], operated by [facility] CATH LAB;  Service: Radiology;  Laterality: N/A;    EXAMINATION UNDER ANESTHESIA N/A 2018    Procedure: Exam under anesthesia;  Surgeon: Neelam Marroquin MD;  Location: [facility] [place] OR;  Service: OB/GYN;  Laterality: N/A;    EXAMINATION UNDER ANESTHESIA N/A 2018    Procedure: Exam under anesthesia (ADD ON );  Surgeon: NICKIE Alvarez MD;  Location: [facility] [place] operated [facility] OR;  Service: OB/GYN;  Laterality: N/A;  (ADD ON )    EXAMINATION UNDER ANESTHESIA N/A 2018    Procedure: Exam under anesthesia -cervical suturing ;  Surgeon: Lei Sims III, MD;  Location: [facility] [place] operated [facility] OR;  Service: OB/GYN;  Laterality: N/A;    LIVER BIOPSY      LIVER TRANSPLANT  1992     PERINEORRHAPHY  6/19/2018    Procedure: SUTURE REPAIR,CERVIX;  Surgeon: Neelam Marroquin MD;  Location: [facility] [place] OR;  Service: OB/GYN;;    TUBAL LIGATION  2010       Review of patient's allergies indicates:   Allergen Reactions    Chloral hydrate      Other reaction(s): Hallucinations  Other reaction(s): Hives       No current facility-administered medications on file prior to encounter.      Current Outpatient Prescriptions on File Prior to Encounter   Medication Sig    acetaminophen-codeine 300-30mg (TYLENOL #3) 300-30 mg Tab Take 1 tablet by mouth every 6 (six) hours as needed.    aspirin 81 MG Chew Take 1 tablet (81 mg total) by mouth once daily.    cinacalcet (SENSIPAR) 30 MG Tab Take 1 tablet (30 mg total) by mouth daily with breakfast. (Patient taking differently: Take 30 mg by mouth. On Tuesday, Thursday, and Saturday with dialysis)    citalopram (CELEXA) 20 MG tablet TAKE 1 TABLET BY MOUTH EVERY DAY    clindamycin (CLINDESSE) 2 % vaginal cream Place 1 full applicator nightly    cloNIDine 0.3 mg/24 hr td ptwk (CATAPRES) 0.3 mg/24 hr Place 1 patch onto the skin every 7 days.    conjugated estrogens (PREMARIN) vaginal cream Regimen: 2g nightly for 1 week then 1g nightly for 1 week, then 0.5g M/W/F nights    famotidine (PEPCID) 40 MG tablet Take 0.5 tablets (20 mg total) by mouth once daily.    food supplemt, lactose-reduced (ENSURE ACTIVE HIGH PROTEIN) Liqd Take 236 mLs by mouth 2 (two) times daily.    labetalol (NORMODYNE) 200 MG tablet Take 2 tablets (400 mg total) by mouth every 8 (eight) hours. (Patient taking differently: Take 400 mg by mouth every 12 (twelve) hours. )    lisinopril (PRINIVIL,ZESTRIL) 40 MG tablet Take 1 tablet (40 mg total) by mouth once daily.    mycophenolate (CELLCEPT) 250 mg Cap Take 1,000 mg by mouth 2 (two) times daily.    NIFEdipine (PROCARDIA-XL) 60 MG (OSM) 24 hr tablet Take 2 tablets (120 mg total) by mouth once daily.    ondansetron (ZOFRAN) 4 MG tablet Take 1  tablet (4 mg total) by mouth every 6 (six) hours as needed for Nausea. (Patient taking differently: Take 4 mg by mouth once daily. )    predniSONE (DELTASONE) 1 MG tablet Take 1 mg by mouth every other day.    tacrolimus (PROGRAF) 1 MG Cap Take 7 capsules (7 mg total) by mouth every 12 (twelve) hours.    hydrALAZINE (APRESOLINE) 100 MG tablet Take 1 tablet (100 mg total) by mouth every 8 (eight) hours.    triamcinolone acetonide 0.1% (KENALOG) 0.1 % ointment AAA on arms, legs, and neck bid x 1-2 wks then prn flares only (Patient taking differently: Apply to affected area(s) on arms, legs, and neck twice daily x 1-2 wks then as needed for flares only)     Family History     Problem Relation (Age of Onset)    Hypertension Mother, Father        Social History Main Topics    Smoking status: Never Smoker    Smokeless tobacco: Never Used    Alcohol use No    Drug use: No    Sexual activity: No     Review of Systems   Constitutional: Negative for chills, fatigue and fever.   HENT: Negative for congestion and sneezing.    Eyes: Negative for visual disturbance.   Respiratory: Negative for cough and shortness of breath.    Cardiovascular: Negative for chest pain and palpitations.   Gastrointestinal: Negative for abdominal pain and diarrhea.   Genitourinary: Positive for vaginal bleeding.   Musculoskeletal: Negative for arthralgias.   Skin: Negative for rash.   Neurological: Negative for light-headedness and headaches.     Objective:     Vital Signs (Most Recent):  Temp: 98.2 °F (36.8 °C) (08/07/18 0950)  Pulse: (!) 112 (08/07/18 1432)  Resp: 17 (08/07/18 0950)  BP: (!) 155/99 (08/07/18 1432)  SpO2: 100 % (08/07/18 1432) Vital Signs (24h Range):  Temp:  [98.2 °F (36.8 °C)-98.3 °F (36.8 °C)] 98.2 °F (36.8 °C)  Pulse:  [101-112] 112  Resp:  [17-18] 17  SpO2:  [97 %-100 %] 100 %  BP: (155-228)/() 155/99     Weight: 52.6 kg (116 lb)  Body mass index is 19.3 kg/m².    Physical Exam   Constitutional: She is oriented  to person, place, and time. She appears well-developed and well-nourished. No distress.   HENT:   Head: Normocephalic and atraumatic.   Eyes: EOM are normal.   Neck: Normal range of motion.   Cardiovascular: Regular rhythm.    Tachy     Pulmonary/Chest: Effort normal and breath sounds normal.   Abdominal: Soft. Bowel sounds are normal. There is no tenderness.   Musculoskeletal: She exhibits no edema.   Neurological: She is alert and oriented to person, place, and time.   Skin: No erythema.   Vitals reviewed.        CRANIAL NERVES     CN III, IV, VI   Extraocular motions are normal.        Significant Labs: All pertinent labs within the past 24 hours have been reviewed.    Significant Imaging: I have reviewed all pertinent imaging results/findings within the past 24 hours.    Assessment/Plan:     * Acute blood loss anemia    - has been an ongoing problem due to vaginal bleeding after LEEP procedure  - gyn consulted  - transfuse to keep Hb > 7          Vaginal bleeding    - transfuse to keep Hb > 7  - appreciate gyn consult           Thrombocytopenia    - platelets > 50K   - no need for transfusion   - continue to monitor           Anemia in ESRD (end-stage renal disease)    - see acute blood loss anemia           Renovascular hypertension    - patient poorly controlled  - continue HD per nephrology  - resume all home meds           ESRD on hemodialysis    - on HD MWF  - nephrology consulted; will hold off on HD today and plan for tomorrow   - continue cinacalcet  - continue renal diet           Prophylactic immunotherapy    - per liver transplant           Liver replaced by transplant    - hepatology consult for immunosuppression management  - continue home dose for now of prograf and prednisone  - daily prograf levels             VTE Risk Mitigation         Ordered     IP VTE HIGH RISK PATIENT  Once      08/07/18 1528     Place LINDA hose  Until discontinued      08/07/18 1528     Place sequential compression device   Until discontinued      08/07/18 1528     Reason for No Pharmacological VTE Prophylaxis  Once      08/07/18 1528             Zeenat Almanza MD  Department of Hospital Medicine   [facility]

## 2018-08-07 NOTE — PROVIDER PROGRESS NOTES - EMERGENCY DEPT.
Encounter Date: 8/7/2018    ED Physician Progress Notes        Physician Note:   7:29 AM  NSR at rate of 96 bpm. LVH. Normal intervals. No STEMI.       I, Philip Contreras, am scribing for, and in the presence of, Dr. Sharp. I performed the above scribed service and the documentation accurately describes the services I performed. I attest to the accuracy of the note.     I, Dr. Haresh Sharp, personally performed the services described in this documentation. All medical record entries made by the scribe were at my direction and in my presence.  I have reviewed the chart and agree that the record reflects my personal performance and is accurate and complete.     Haresh Sharp, DO  Dept of Emergency Medicine   [facility]  Spectralink: [id number]

## 2018-08-07 NOTE — SUBJECTIVE & OBJECTIVE
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 ()    Moderate protein-calorie malnutrition 2017    MRSA bacteremia 2017    Pneumonia     Prophylactic immunotherapy 2014    Renovascular hypertension 10/2/2015    Secondary hyperparathyroidism 2017    Seizures     Sialadenitis 3/21/2018    Thrombocytopenia 2016       Past Surgical History:   Procedure Laterality Date     SECTION      x 2    CONIZATION OF CERVIX USING LOOP ELECTROSURGICAL EXCISION PROCEDURE (LEEP) N/A 6/15/2018    Procedure: CONIZATION-CERVICAL-LEEP;  Surgeon: Neelam Marroquin MD;  Location: [facility] OR;  Service: OB/GYN;  Laterality: N/A;    EMBOLIZATION N/A 2018    Procedure: EMBOLIZATION, BLOOD VESSEL;  Surgeon: Aren Ramos MD;  Location: [facility] CATH LAB;  Service: Radiology;  Laterality: N/A;    EXAMINATION UNDER ANESTHESIA N/A 2018    Procedure: Exam under anesthesia;  Surgeon: Neelam Marroquin MD;  Location: [facility] OR;  Service: OB/GYN;  Laterality: N/A;    EXAMINATION UNDER ANESTHESIA N/A 2018    Procedure: Exam under anesthesia (ADD ON );  Surgeon: NICKIE Alvarez MD;  Location: [facility] OR;  Service: OB/GYN;  Laterality: N/A;  (ADD ON )    EXAMINATION UNDER ANESTHESIA N/A 2018    Procedure: Exam under anesthesia -cervical suturing ;  Surgeon: Lei Sims III, MD;  Location: [facility] OR;  Service:  OB/GYN;  Laterality: N/A;    LIVER BIOPSY      LIVER TRANSPLANT  09/1992    PERINEORRHAPHY  6/19/2018    Procedure: SUTURE REPAIR,CERVIX;  Surgeon: Neelam Marroquin MD;  Location: [facility];  Service: OB/GYN;;    TUBAL LIGATION  2010       Review of patient's allergies indicates:   Allergen Reactions    Chloral hydrate      Other reaction(s): Hallucinations  Other reaction(s): Hives     Current Facility-Administered Medications   Medication Frequency    0.9%  NaCl infusion (for blood administration) Q24H PRN    acetaminophen tablet 650 mg Q6H PRN    [START ON 8/9/2018] cinacalcet tablet 30 mg Every Tues, Thurs, Sat    [START ON 8/8/2018] citalopram tablet 20 mg Daily    [START ON 8/8/2018] cloNIDine 0.3 mg/24 hr td ptwk 1 patch Q7 Days    dextrose 50% injection 12.5 g PRN    dextrose 50% injection 25 g PRN    [START ON 8/8/2018] famotidine tablet 20 mg Daily    glucagon (human recombinant) injection 1 mg PRN    glucose chewable tablet 16 g PRN    glucose chewable tablet 24 g PRN    hydrALAZINE tablet 100 mg Q8H    labetalol tablet 400 mg Q8H    [START ON 8/8/2018] lisinopril tablet 40 mg Daily    mycophenolate capsule 1,000 mg BID    [START ON 8/8/2018] NIFEdipine 24 hr tablet 120 mg Daily    ondansetron tablet 4 mg Q6H PRN    [START ON 8/8/2018] predniSONE tablet 1 mg Every other day    sodium chloride 0.9% flush 5 mL PRN    tacrolimus capsule 7 mg BID     Current Outpatient Prescriptions   Medication    acetaminophen-codeine 300-30mg (TYLENOL #3) 300-30 mg Tab    aspirin 81 MG Chew    cinacalcet (SENSIPAR) 30 MG Tab    citalopram (CELEXA) 20 MG tablet    clindamycin (CLINDESSE) 2 % vaginal cream    cloNIDine 0.3 mg/24 hr td ptwk (CATAPRES) 0.3 mg/24 hr    conjugated estrogens (PREMARIN) vaginal cream    famotidine (PEPCID) 40 MG tablet    food supplemt, lactose-reduced (ENSURE ACTIVE HIGH PROTEIN) Liqd    labetalol (NORMODYNE) 200 MG tablet    lisinopril (PRINIVIL,ZESTRIL) 40  MG tablet    mycophenolate (CELLCEPT) 250 mg Cap    NIFEdipine (PROCARDIA-XL) 60 MG (OSM) 24 hr tablet    ondansetron (ZOFRAN) 4 MG tablet    predniSONE (DELTASONE) 1 MG tablet    tacrolimus (PROGRAF) 1 MG Cap    hydrALAZINE (APRESOLINE) 100 MG tablet    triamcinolone acetonide 0.1% (KENALOG) 0.1 % ointment     Family History     Problem Relation (Age of Onset)    Hypertension Mother, Father        Social History Main Topics    Smoking status: Never Smoker    Smokeless tobacco: Never Used    Alcohol use No    Drug use: No    Sexual activity: No     Review of Systems   Constitutional: Negative for activity change, chills, fatigue and fever.   HENT: Negative for congestion, facial swelling, nosebleeds, trouble swallowing and voice change.    Respiratory: Negative for cough, chest tightness and shortness of breath.    Cardiovascular: Negative for chest pain, palpitations and leg swelling.   Gastrointestinal: Negative for abdominal distention, anal bleeding, constipation, diarrhea and nausea.   Genitourinary: Positive for vaginal bleeding.   Musculoskeletal: Negative for arthralgias, gait problem and joint swelling.   Neurological: Negative for dizziness, syncope and headaches.   Psychiatric/Behavioral: Negative for agitation, behavioral problems and confusion.     Objective:     Vital Signs (Most Recent):  Temp: 98.2 °F (36.8 °C) (08/07/18 0950)  Pulse: (!) 112 (08/07/18 1432)  Resp: 17 (08/07/18 0950)  BP: (!) 155/99 (08/07/18 1432)  SpO2: 100 % (08/07/18 1432)  O2 Device (Oxygen Therapy): room air (08/07/18 0713) Vital Signs (24h Range):  Temp:  [98.2 °F (36.8 °C)-98.3 °F (36.8 °C)] 98.2 °F (36.8 °C)  Pulse:  [101-112] 112  Resp:  [17-18] 17  SpO2:  [97 %-100 %] 100 %  BP: (155-228)/() 155/99     Weight: 52.6 kg (116 lb) (08/07/18 0713)  Body mass index is 19.3 kg/m².  Body surface area is 1.55 meters squared.    No intake/output data recorded.    Physical Exam   Constitutional: She is oriented to  person, place, and time. She appears well-developed and well-nourished. No distress.   HENT:   Head: Normocephalic and atraumatic.   Right Ear: External ear normal.   Left Ear: External ear normal.   Eyes: Conjunctivae and EOM are normal. Right eye exhibits no discharge. Left eye exhibits no discharge. No scleral icterus.   Neck: Normal range of motion. Neck supple. No tracheal deviation present.   Cardiovascular: Normal rate, regular rhythm, normal heart sounds and intact distal pulses.  Exam reveals no gallop and no friction rub.    No murmur heard.  Pulmonary/Chest: Effort normal and breath sounds normal. No stridor. No respiratory distress. She has no wheezes. She has no rales.   Abdominal: Soft. Bowel sounds are normal. She exhibits no distension and no mass. There is no tenderness. There is no guarding.   Neurological: She is alert and oriented to person, place, and time.   Skin: Skin is warm and dry. She is not diaphoretic.   Psychiatric: She has a normal mood and affect. Her behavior is normal. Judgment and thought content normal.   Nursing note and vitals reviewed.      Significant Labs:  CBC:   Recent Labs  Lab 08/07/18  0806   WBC 4.13   RBC 2.24*   HGB 6.1*   HCT 19.6*   PLT 72*   MCV 88   MCH 27.2   MCHC 31.1*     CMP:   Recent Labs  Lab 08/07/18  0806   GLU 90   CALCIUM 9.0   ALBUMIN 2.5*   PROT 7.0      K 4.2   CO2 26      BUN 48*   CREATININE 8.4*   ALKPHOS 663*   ALT 33   AST 43*   BILITOT 0.7

## 2018-08-07 NOTE — ASSESSMENT & PLAN NOTE
- on HD MWF  - nephrology consulted; will hold off on HD today and plan for tomorrow   - continue cinacalcet  - continue renal diet

## 2018-08-07 NOTE — SUBJECTIVE & OBJECTIVE
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 ()    Moderate protein-calorie malnutrition 2017    MRSA bacteremia 2017    Pneumonia     Prophylactic immunotherapy 2014    Renovascular hypertension 10/2/2015    Secondary hyperparathyroidism 2017    Seizures     Sialadenitis 3/21/2018    Thrombocytopenia 2016       Past Surgical History:   Procedure Laterality Date     SECTION      x 2    CONIZATION OF CERVIX USING LOOP ELECTROSURGICAL EXCISION PROCEDURE (LEEP) N/A 6/15/2018    Procedure: CONIZATION-CERVICAL-LEEP;  Surgeon: Neelam Marroquin MD;  Location: [facility] OR;  Service: OB/GYN;  Laterality: N/A;    EMBOLIZATION N/A 2018    Procedure: EMBOLIZATION, BLOOD VESSEL;  Surgeon: Aren Ramos MD;  Location: [facility] CATH LAB;  Service: Radiology;  Laterality: N/A;    EXAMINATION UNDER ANESTHESIA N/A 2018    Procedure: Exam under anesthesia;  Surgeon: Neelam Marroquin MD;  Location: [facility] OR;  Service: OB/GYN;  Laterality: N/A;    EXAMINATION UNDER ANESTHESIA N/A 2018    Procedure: Exam under anesthesia (ADD ON );  Surgeon: NICKIE Alvarez MD;  Location: [facility] OR;  Service: OB/GYN;  Laterality: N/A;  (ADD ON )    EXAMINATION UNDER ANESTHESIA N/A 2018    Procedure: Exam under anesthesia -cervical suturing ;  Surgeon: Lei Sims III, MD;  Location: [facility] OR;  Service:  OB/GYN;  Laterality: N/A;    LIVER BIOPSY      LIVER TRANSPLANT  09/1992    PERINEORRHAPHY  6/19/2018    Procedure: SUTURE REPAIR,CERVIX;  Surgeon: Neelma Marroquin MD;  Location: [facility];  Service: OB/GYN;;    TUBAL LIGATION  2010       Review of patient's allergies indicates:   Allergen Reactions    Chloral hydrate      Other reaction(s): Hallucinations  Other reaction(s): Hives       No current facility-administered medications on file prior to encounter.      Current Outpatient Prescriptions on File Prior to Encounter   Medication Sig    acetaminophen-codeine 300-30mg (TYLENOL #3) 300-30 mg Tab Take 1 tablet by mouth every 6 (six) hours as needed.    aspirin 81 MG Chew Take 1 tablet (81 mg total) by mouth once daily.    cinacalcet (SENSIPAR) 30 MG Tab Take 1 tablet (30 mg total) by mouth daily with breakfast. (Patient taking differently: Take 30 mg by mouth. On Tuesday, Thursday, and Saturday with dialysis)    citalopram (CELEXA) 20 MG tablet TAKE 1 TABLET BY MOUTH EVERY DAY    clindamycin (CLINDESSE) 2 % vaginal cream Place 1 full applicator nightly    cloNIDine 0.3 mg/24 hr td ptwk (CATAPRES) 0.3 mg/24 hr Place 1 patch onto the skin every 7 days.    conjugated estrogens (PREMARIN) vaginal cream Regimen: 2g nightly for 1 week then 1g nightly for 1 week, then 0.5g M/W/F nights    famotidine (PEPCID) 40 MG tablet Take 0.5 tablets (20 mg total) by mouth once daily.    food supplemt, lactose-reduced (ENSURE ACTIVE HIGH PROTEIN) Liqd Take 236 mLs by mouth 2 (two) times daily.    labetalol (NORMODYNE) 200 MG tablet Take 2 tablets (400 mg total) by mouth every 8 (eight) hours. (Patient taking differently: Take 400 mg by mouth every 12 (twelve) hours. )    lisinopril (PRINIVIL,ZESTRIL) 40 MG tablet Take 1 tablet (40 mg total) by mouth once daily.    mycophenolate (CELLCEPT) 250 mg Cap Take 1,000 mg by mouth 2 (two) times daily.    NIFEdipine (PROCARDIA-XL) 60 MG (OSM) 24 hr tablet Take 2 tablets  (120 mg total) by mouth once daily.    ondansetron (ZOFRAN) 4 MG tablet Take 1 tablet (4 mg total) by mouth every 6 (six) hours as needed for Nausea. (Patient taking differently: Take 4 mg by mouth once daily. )    predniSONE (DELTASONE) 1 MG tablet Take 1 mg by mouth every other day.    tacrolimus (PROGRAF) 1 MG Cap Take 7 capsules (7 mg total) by mouth every 12 (twelve) hours.    hydrALAZINE (APRESOLINE) 100 MG tablet Take 1 tablet (100 mg total) by mouth every 8 (eight) hours.    triamcinolone acetonide 0.1% (KENALOG) 0.1 % ointment AAA on arms, legs, and neck bid x 1-2 wks then prn flares only (Patient taking differently: Apply to affected area(s) on arms, legs, and neck twice daily x 1-2 wks then as needed for flares only)     Family History     Problem Relation (Age of Onset)    Hypertension Mother, Father        Social History Main Topics    Smoking status: Never Smoker    Smokeless tobacco: Never Used    Alcohol use No    Drug use: No    Sexual activity: No     Review of Systems   Constitutional: Negative for chills, fatigue and fever.   HENT: Negative for congestion and sneezing.    Eyes: Negative for visual disturbance.   Respiratory: Negative for cough and shortness of breath.    Cardiovascular: Negative for chest pain and palpitations.   Gastrointestinal: Negative for abdominal pain and diarrhea.   Genitourinary: Positive for vaginal bleeding.   Musculoskeletal: Negative for arthralgias.   Skin: Negative for rash.   Neurological: Negative for light-headedness and headaches.     Objective:     Vital Signs (Most Recent):  Temp: 98.2 °F (36.8 °C) (08/07/18 0950)  Pulse: (!) 112 (08/07/18 1432)  Resp: 17 (08/07/18 0950)  BP: (!) 155/99 (08/07/18 1432)  SpO2: 100 % (08/07/18 1432) Vital Signs (24h Range):  Temp:  [98.2 °F (36.8 °C)-98.3 °F (36.8 °C)] 98.2 °F (36.8 °C)  Pulse:  [101-112] 112  Resp:  [17-18] 17  SpO2:  [97 %-100 %] 100 %  BP: (155-228)/() 155/99     Weight: 52.6 kg (116  lb)  Body mass index is 19.3 kg/m².    Physical Exam   Constitutional: She is oriented to person, place, and time. She appears well-developed and well-nourished. No distress.   HENT:   Head: Normocephalic and atraumatic.   Eyes: EOM are normal.   Neck: Normal range of motion.   Cardiovascular: Regular rhythm.    Tachy     Pulmonary/Chest: Effort normal and breath sounds normal.   Abdominal: Soft. Bowel sounds are normal. There is no tenderness.   Musculoskeletal: She exhibits no edema.   Neurological: She is alert and oriented to person, place, and time.   Skin: No erythema.   Vitals reviewed.        CRANIAL NERVES     CN III, IV, VI   Extraocular motions are normal.        Significant Labs: All pertinent labs within the past 24 hours have been reviewed.    Significant Imaging: I have reviewed all pertinent imaging results/findings within the past 24 hours.

## 2018-08-07 NOTE — ASSESSMENT & PLAN NOTE
[facility]  [facility]      Plan/Recommendations:  No urgent need for RRT today. Volume status/electrolytes stable.  Will plan for HD in the AM for metabolic clearance/volume management.    Recommend to start patient on her home BP medications.    RFP/CBC in AM.  Recommend to transfuse additional units of PRBC while on HD if indicated.  Continue cinacalcet per OP schedule.

## 2018-08-07 NOTE — ED NOTES
The patient is awake, alert and cooperative with a calm affect, patient is aware of environment. Airway is open and patent, respirations are spontaneous, normal respiratory effort and rate noted, skin warm and dry, moves all extremities well, appearance: no apparent distress noted. Side rails x 2. Call bell within reach. Will continue to monitor. Pt updated on the current status. Pt continues with blood transfusion, tolerating well.

## 2018-08-07 NOTE — ANESTHESIA PREPROCEDURE EVALUATION
[facility]  Anesthesia Pre-Operative Evaluation         Patient Name: Holly Patel  YOB: 1990  MRN: [id number]    SUBJECTIVE:     Pre-operative evaluation for Procedure(s) (LRB):  Exam Under Anesthesia (N/A)     08/07/2018    Holly Patel is a 27 y.o. female w/ a significant PMHx of ESRD (HD qT/Th/S). HTN, OLTx (1992) who presents to [facility] [place] [facility] for evaluation of vaginal bleeding. S/P LEEP procedure in 06/2018 and has had bleeding issues since w/ multiple presentations to [facility] and [facility]. In the ED, patient was noted to have BP of 228/140 despite Hgb of 6.1 g/dL.    Patient now presents for the above procedure(s).      LDA:        Peripheral IV - Single Lumen 08/07/18 0750 Right Antecubital (Active)   Site Assessment Dry;Clean;Intact 8/7/2018  7:50 AM   Line Status Blood return noted;Flushed 8/7/2018  7:50 AM   Dressing Intervention New dressing 8/7/2018  7:50 AM   Number of days: 0            Hemodialysis AV Fistula Left forearm (Active)   Needle Size 15ga 7/28/2018  9:10 AM   Site Assessment Clean;Dry;Intact;No redness;No swelling 7/28/2018  8:00 PM   Patency Thrill;Bruit;Present 7/28/2018  8:00 PM   Status Accessed 7/28/2018  9:10 AM   Flows Good 7/28/2018  9:10 AM   Dressing Intervention New dressing 7/28/2018  8:00 PM   Dressing Status Clean;Dry;Intact 7/28/2018  8:00 PM   Site Condition No complications 7/28/2018  8:00 PM   Dressing Open to air (None) 7/27/2018  7:40 PM   Drainage Description Other (Comment) 4/14/2018  5:26 PM   Number of days:        Prev airway: Jamie #3; Style: Cuffed; Cuff Inflation: Minimal occlusive pressure; Placement Verified By: Capnometry, Auscultation; Grade: Grade I; Complicating Factors: None.    Drips: None documented.    Patient Active Problem List   Diagnosis   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    Prophylactic immunotherapy    ESRD on hemodialysis    Renovascular hypertension    Vagina bleeding    Acute blood loss anemia     Anemia in ESRD (end-stage renal disease)    Sepsis    Non compliance w medication regimen    ESRD (end stage renal disease) on dialysis    Immunosuppressed    Secondary hyperparathyroidism    Iron deficiency anemia secondary to inadequate dietary iron intake    Moderate protein-calorie malnutrition    Elevated troponin    Seizures    Pancytopenia    Hypervolemia    Weight loss, unintentional    Thrombocytopenia    Leukopenia    Chronic fatigue    Anemia of unknown etiology    Macrocytic anemia    Fever    Abnormal CXR    Body aches    MSSA bacteremia    Anemia    Depression    Hypertension    Hypertensive emergency    Postoperative vaginal bleeding following genitourinary procedure    Symptomatic anemia    Vaginal atrophy with desquamative inflammatory vaginitis    Vaginal bleeding       Review of patient's allergies indicates:   Allergen Reactions    Chloral hydrate      Other reaction(s): Hallucinations  Other reaction(s): Hives       Current Inpatient Medications:   [START ON 8/9/2018] cinacalcet  30 mg Oral Every Tues, Thurs, Sat    [START ON 8/8/2018] citalopram  20 mg Oral Daily    [START ON 8/8/2018] cloNIDine 0.3 mg/24 hr td ptwk  1 patch Transdermal Q7 Days    [START ON 8/8/2018] famotidine  20 mg Oral Daily    hydrALAZINE  100 mg Oral Q8H    labetalol  400 mg Oral Q8H    [START ON 8/8/2018] lisinopril  40 mg Oral Daily    mycophenolate  1,000 mg Oral BID    [START ON 8/8/2018] NIFEdipine  120 mg Oral Daily    [START ON 8/8/2018] predniSONE  1 mg Oral Every other day    tacrolimus  7 mg Oral BID       No current facility-administered medications on file prior to encounter.      Current Outpatient Prescriptions on File Prior to Encounter   Medication Sig Dispense Refill    acetaminophen-codeine 300-30mg (TYLENOL #3) 300-30 mg Tab Take 1 tablet by mouth every 6 (six) hours as needed. 10 tablet 0    aspirin 81 MG Chew Take 1 tablet (81 mg total) by mouth once  daily.  0    cinacalcet (SENSIPAR) 30 MG Tab Take 1 tablet (30 mg total) by mouth daily with breakfast. (Patient taking differently: Take 30 mg by mouth. On Tuesday, Thursday, and Saturday with dialysis) 30 tablet 11    citalopram (CELEXA) 20 MG tablet TAKE 1 TABLET BY MOUTH EVERY DAY 30 tablet 1    clindamycin (CLINDESSE) 2 % vaginal cream Place 1 full applicator nightly 40 g 3    cloNIDine 0.3 mg/24 hr td ptwk (CATAPRES) 0.3 mg/24 hr Place 1 patch onto the skin every 7 days. 4 patch 11    conjugated estrogens (PREMARIN) vaginal cream Regimen: 2g nightly for 1 week then 1g nightly for 1 week, then 0.5g M/W/F nights 30 g 11    famotidine (PEPCID) 40 MG tablet Take 0.5 tablets (20 mg total) by mouth once daily. 15 tablet 11    food supplemt, lactose-reduced (ENSURE ACTIVE HIGH PROTEIN) Liqd Take 236 mLs by mouth 2 (two) times daily. 60 Can 6    labetalol (NORMODYNE) 200 MG tablet Take 2 tablets (400 mg total) by mouth every 8 (eight) hours. (Patient taking differently: Take 400 mg by mouth every 12 (twelve) hours. ) 360 tablet 11    lisinopril (PRINIVIL,ZESTRIL) 40 MG tablet Take 1 tablet (40 mg total) by mouth once daily. 30 tablet 0    mycophenolate (CELLCEPT) 250 mg Cap Take 1,000 mg by mouth 2 (two) times daily.      NIFEdipine (PROCARDIA-XL) 60 MG (OSM) 24 hr tablet Take 2 tablets (120 mg total) by mouth once daily. 60 tablet 11    ondansetron (ZOFRAN) 4 MG tablet Take 1 tablet (4 mg total) by mouth every 6 (six) hours as needed for Nausea. (Patient taking differently: Take 4 mg by mouth once daily. ) 15 tablet 0    predniSONE (DELTASONE) 1 MG tablet Take 1 mg by mouth every other day.      tacrolimus (PROGRAF) 1 MG Cap Take 7 capsules (7 mg total) by mouth every 12 (twelve) hours. 420 capsule 11    hydrALAZINE (APRESOLINE) 100 MG tablet Take 1 tablet (100 mg total) by mouth every 8 (eight) hours. 90 tablet 0    triamcinolone acetonide 0.1% (KENALOG) 0.1 % ointment AAA on arms, legs, and neck bid  x 1-2 wks then prn flares only (Patient taking differently: Apply to affected area(s) on arms, legs, and neck twice daily x 1-2 wks then as needed for flares only) 80 g 3       Past Surgical History:   Procedure Laterality Date     SECTION      x 2    CONIZATION OF CERVIX USING LOOP ELECTROSURGICAL EXCISION PROCEDURE (LEEP) N/A 6/15/2018    Procedure: CONIZATION-CERVICAL-LEEP;  Surgeon: Neelam Marroquin MD;  Location: [facility] OR;  Service: OB/GYN;  Laterality: N/A;    EMBOLIZATION N/A 2018    Procedure: EMBOLIZATION, BLOOD VESSEL;  Surgeon: Aren Ramos MD;  Location: [facility] CATH LAB;  Service: Radiology;  Laterality: N/A;    EXAMINATION UNDER ANESTHESIA N/A 2018    Procedure: Exam under anesthesia;  Surgeon: Neelam Marroquin MD;  Location: [facility] OR;  Service: OB/GYN;  Laterality: N/A;    EXAMINATION UNDER ANESTHESIA N/A 2018    Procedure: Exam under anesthesia (ADD ON );  Surgeon: NICKIE Alvarez MD;  Location: [facility] OR;  Service: OB/GYN;  Laterality: N/A;  (ADD ON )    EXAMINATION UNDER ANESTHESIA N/A 2018    Procedure: Exam under anesthesia -cervical suturing ;  Surgeon: Lei Sims III, MD;  Location: [facility];  Service: OB/GYN;  Laterality: N/A;    LIVER BIOPSY      LIVER TRANSPLANT  1992    PERINEORRHAPHY  2018    Procedure: SUTURE REPAIR,CERVIX;  Surgeon: Neelam Marroquin MD;  Location: [facility];  Service: OB/GYN;;    TUBAL LIGATION         Social History     Social History    Marital status: Legally      Spouse name: N/A    Number of children: N/A    Years of education: N/A     Occupational History    Not on file.     Social History Main Topics    Smoking status: Never Smoker    Smokeless tobacco: Never Used    Alcohol use No    Drug use: No    Sexual activity: No     Other Topics Concern    Are You Pregnant Or Think You May Be? No    Breast-Feeding No     Social History Narrative    Lives 2 kids, 7 and 8, and nephew. Her  mom helps with kids.       OBJECTIVE:     Vital Signs Range (Last 24H):  Temp:  [36.8 °C (98.2 °F)-36.8 °C (98.3 °F)]   Pulse:  [101-112]   Resp:  [17-18]   BP: (155-228)/()   SpO2:  [97 %-100 %]       CBC:   Recent Labs      08/07/18   0806   WBC  4.13   RBC  2.24*   HGB  6.1*   HCT  19.6*   PLT  72*   MCV  88   MCH  27.2   MCHC  31.1*       CMP:   Recent Labs      08/07/18   0806   NA  139   K  4.2   CL  103   CO2  26   BUN  48*   CREATININE  8.4*   GLU  90   CALCIUM  9.0   ALBUMIN  2.5*   PROT  7.0   ALKPHOS  663*   ALT  33   AST  43*   BILITOT  0.7       INR:  Recent Labs      08/07/18   0806   INR  1.1       Diagnostic Studies: No relevant studies.    EKG (08/07/18):  Vent. Rate : 096 BPM     Atrial Rate : 096 BPM  P-R Int : 136 ms          QRS Dur : 088 ms  QT Int : 364 ms       P-R-T Axes : 067 085 068 degrees  QTc Int : 459 ms    Normal sinus rhythm  Voltage criteria for left ventricular hypertrophy  Abnormal ECG  When compared with ECG of 06-JUL-2018 12:33   No significant change was Found    2D ECHO:  Results for orders placed or performed during the hospital encounter of 02/16/18   2D echo with color flow doppler   Result Value Ref Range    EF 65 55 - 65    Diastolic Dysfunction Yes (A)     Est. PA Systolic Pressure 44.24 (A)     Pericardial Effusion SMALL (A)     Mitral Valve Mobility NORMAL     Tricuspid Valve Regurgitation TRIVIAL      CONCLUSIONS     1 - Normal left ventricular systolic function (EF 60-65%).     2 - No wall motion abnormalities.     3 - Mod-severe concentric hypertrophy.     4 - Impaired LV relaxation, elevated LAP (grade 2 diastolic dysfunction).     5 - Trivial tricuspid regurgitation.     6 - Pulmonary hypertension. The estimated PA systolic pressure is 44 mmHg.     7 - Small anterior pericardial effusion without hemodynamic compromise.     ASSESSMENT/PLAN:     Anesthesia Evaluation    I have reviewed the Patient Summary Reports.    I have reviewed the Nursing Notes.   I have  reviewed the Medications.     Review of Systems  Anesthesia Hx:  No problems with previous Anesthesia  Denies Family Hx of Anesthesia complications.   Denies Personal Hx of Anesthesia complications.   Social:  Non-Smoker    Hematology/Oncology:     Oncology Normal    -- Anemia: Blood Loss Anemia Anemia of Chronic Kidney Disease Anemia of Chronic Disease  Acute, chronic and acute on chronic   -- Thrombocytopenia:  Denies Current/Recent Cancer   EENT/Dental:  EENT/Dental Normal denies chronic allergic rhinitis    Cardiovascular:   Exercise tolerance: good Hypertension, poorly controlled Denies MI.  Denies CAD.    Denies CABG/stent.  Denies Dysrhythmias.          ECG has been reviewed.    Pulmonary:  Pulmonary Normal  Denies COPD.  Denies Asthma.  Denies Recent URI.  Denies Sleep Apnea.    Renal/:   Chronic Renal Disease, ESRD    Hepatic/GI:   Denies GERD. Liver Disease, OLTx at 1 year of age (1992)   Musculoskeletal:  Musculoskeletal Normal    OB/GYN/PEDS:  Pt had recent embloization of uterine/iliac arteries. Presents again today for vaginal bleeding.   Neurological:   Denies TIA. Denies CVA. Seizures, well controlled    Endocrine:   Diabetes, type 2    Dermatological:  Skin Normal    Psych:   Denies Psychiatric History. depression          Physical Exam  General:  Well nourished    Airway/Jaw/Neck:  Airway Findings: Mouth Opening: Normal Tongue: Normal  General Airway Assessment: Adult  Mallampati: III  Improves to II with phonation.  TM Distance: Normal, at least 6 cm  Jaw/Neck Findings:  Neck ROM: Normal ROM      Dental:  Dental Findings: In tact   Chest/Lungs:  Chest/Lungs Findings: Clear to auscultation, Normal Respiratory Rate     Heart/Vascular:  Heart Findings: Rate: Normal  Rhythm: Regular Rhythm  Sounds: Normal     Abdomen:  Abdomen Findings:  Normal, Soft, Nontender     Musculoskeletal:  Musculoskeletal Findings: Normal   Skin:  Skin Findings: Normal    Mental Status:  Mental Status Findings:   Cooperative, Alert and Oriented         Anesthesia Plan  Type of Anesthesia, risks & benefits discussed:  Anesthesia Type:  general, MAC  Patient's Preference:   Intra-op Monitoring Plan: standard ASA monitors  Intra-op Monitoring Plan Comments:   Post Op Pain Control Plan: multimodal analgesia, IV/PO Opioids PRN and per primary service following discharge from PACU  Post Op Pain Control Plan Comments:   Induction:   IV  Beta Blocker:  Patient is not currently on a Beta-Blocker (No further documentation required).       Informed Consent: Patient understands risks and agrees with Anesthesia plan.  Questions answered. Anesthesia consent signed with patient.  ASA Score: 3     Day of Surgery Review of History & Physical:    H&P update referred to the surgeon.         Ready For Surgery From Anesthesia Perspective.

## 2018-08-07 NOTE — ED PROVIDER NOTES
Encounter Date: 8/7/2018       History     Chief Complaint   Patient presents with    Vaginal Bleeding     LEEP procedure on June 15th and still bleeding from it/ pt reports going through 2 pads per hour    Hypertension     27-year-old  female with history of liver transplant in 1992 currently on Prograf and prednisone, ESRD on HD TTS, thrombocytopenia, uncontrolled hypertension, anemia presents to the ED complaining of persistent vaginal bleeding.  She had a LEEP procedure in June 2018 and has been having vaginal bleeding since then.  She has been admitted multiple times at [facility] with the same complaint.  She has had multiple transfusions.  She states bleeding has persisted and she is soaking through a pad every hour with passage of clots.  She endorses nausea and had 1 episode of emesis this morning.  She states vaginal bleeding is not worsening but is not improving which prompted her ED visit today.  She denies fever, chills, chest pain, shortness of breath, abdominal pain, abdominal cramping, dysuria, headache. She did not take any of her medications this morning prior to arrival in the ED.  She denies tobacco use.      The history is provided by the patient.     Review of patient's allergies indicates:   Allergen Reactions    Chloral hydrate      Other reaction(s): Hallucinations  Other reaction(s): Hives     Past Medical History:   Diagnosis Date    Anemia in ESRD (end-stage renal disease) 10/12/2015    dialysis tues, thursday, sat; access left arm    Chronic rejection of liver transplant 3/22/2016    Depression     Encounter for blood transfusion     ESRD on hemodialysis 9/30/2015    History of recent hospitalization 05/2018    pneumonia    History of splenomegaly 4/12/2016    Immunosuppressed 8/5/2017    Iron deficiency anemia secondary to inadequate dietary iron intake 8/16/2017    She [facility] iron [facility].   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  9/10/2012    hemangioendothelioma s/p LTx ()    Moderate protein-calorie malnutrition 2017    MRSA bacteremia 2017    Pneumonia     Prophylactic immunotherapy 2014    Renovascular hypertension 10/2/2015    Secondary hyperparathyroidism 2017    Seizures     Sialadenitis 3/21/2018    Thrombocytopenia 2016     Past Surgical History:   Procedure Laterality Date     SECTION      x 2    CONIZATION OF CERVIX USING LOOP ELECTROSURGICAL EXCISION PROCEDURE (LEEP) N/A 6/15/2018    Procedure: CONIZATION-CERVICAL-LEEP;  Surgeon: Neelam Marroquin MD;  Location: [facility] OR;  Service: OB/GYN;  Laterality: N/A;    EMBOLIZATION N/A 2018    Procedure: EMBOLIZATION, BLOOD VESSEL;  Surgeon: Aren Ramos MD;  Location: [facility] CATH LAB;  Service: Radiology;  Laterality: N/A;    EXAMINATION UNDER ANESTHESIA N/A 2018    Procedure: Exam under anesthesia;  Surgeon: Neelam Marroquin MD;  Location: [facility] OR;  Service: OB/GYN;  Laterality: N/A;    EXAMINATION UNDER ANESTHESIA N/A 2018    Procedure: Exam under anesthesia (ADD ON );  Surgeon: NICKIE Alvarez MD;  Location: [facility] OR;  Service: OB/GYN;  Laterality: N/A;  (ADD ON )    EXAMINATION UNDER ANESTHESIA N/A 2018    Procedure: Exam under anesthesia -cervical suturing ;  Surgeon: Lei Sims III, MD;  Location: [facility];  Service: OB/GYN;  Laterality: N/A;    LIVER BIOPSY      LIVER TRANSPLANT  1992    PERINEORRHAPHY  2018    Procedure: SUTURE REPAIR,CERVIX;  Surgeon: Neelam Marroquin MD;  Location: [facility];  Service: OB/GYN;;    TUBAL LIGATION       Family History   Problem Relation Age of Onset    Hypertension Mother     Hypertension Father     Melanoma Neg Hx     Breast cancer Neg Hx     Colon cancer Neg Hx     Ovarian cancer Neg Hx      Social History   Substance Use Topics    Smoking status: Never Smoker    Smokeless tobacco: Never Used    Alcohol use No     Review of Systems    Constitutional: Negative for chills and fever.   HENT: Negative for congestion, rhinorrhea and sore throat.    Eyes: Negative for photophobia and visual disturbance.   Respiratory: Negative for shortness of breath.    Cardiovascular: Negative for chest pain.   Gastrointestinal: Positive for nausea and vomiting (x1). Negative for abdominal pain, constipation and diarrhea.   Genitourinary: Positive for vaginal bleeding. Negative for dysuria, pelvic pain, vaginal discharge and vaginal pain.   Musculoskeletal: Negative for back pain, neck pain and neck stiffness.   Skin: Negative for rash and wound.   Neurological: Negative for weakness, light-headedness, numbness and headaches.   Psychiatric/Behavioral: Negative for confusion.       Physical Exam     Initial Vitals [08/07/18 0713]   BP Pulse Resp Temp SpO2   (!) 228/140 101 18 98.3 °F (36.8 °C) 97 %      MAP       --         Physical Exam    Nursing note and vitals reviewed.  Constitutional: She appears well-developed and well-nourished. She is not diaphoretic. No distress.   HENT:   Head: Normocephalic and atraumatic.   Neck: Normal range of motion. Neck supple.   Cardiovascular: Regular rhythm. Tachycardia present.  Exam reveals no gallop and no friction rub.    Murmur heard.  Pulmonary/Chest: Breath sounds normal. She has no wheezes. She has no rhonchi. She has no rales.   Abdominal: Soft. Bowel sounds are normal. There is no tenderness. There is no rebound and no guarding.   Genitourinary: There is bleeding in the vagina.   Genitourinary Comments: Moderate amount of bleeding and clots noted. Pain with pelvic exam.   Musculoskeletal: Normal range of motion.   Fistula noted to the L lower arm with palpable thrill. No tenderness or erythema   Neurological: She is alert and oriented to person, place, and time.   Skin: Skin is warm and dry. No rash noted. No erythema.   Psychiatric: She has a normal mood and affect.         ED Course   Procedures  Labs Reviewed   CBC  W/ AUTO DIFFERENTIAL - Abnormal; Notable for the following:        Result Value    RBC 2.24 (*)     Hemoglobin 6.1 (*)     Hematocrit 19.6 (*)     MCHC 31.1 (*)     RDW 15.8 (*)     Platelets 72 (*)     Lymph # 0.5 (*)     Mono # 0.2 (*)     Gran% 75.3 (*)     Lymph% 11.6 (*)     Mono% 3.9 (*)     Eosinophil% 8.5 (*)     All other components within normal limits    Narrative:     HCT critical result(s) called and verbal readback obtained from Samira Wang RN, 08/07/2018 09:20   COMPREHENSIVE METABOLIC PANEL - Abnormal; Notable for the following:     BUN, Bld 48 (*)     Creatinine 8.4 (*)     Albumin 2.5 (*)     Alkaline Phosphatase 663 (*)     AST 43 (*)     eGFR if  6.8 (*)     eGFR if non  5.9 (*)     All other components within normal limits   PROTIME-INR   TYPE & SCREEN   PREPARE RBC SOFT          Imaging Results    None          Medical Decision Making:   History:   Old Medical Records: I decided to obtain old medical records.  Old Records Summarized: records from clinic visits and records from previous admission(s).       <> Summary of Records: Followed by OBGYN at [facility]. LEEP procedure on 6/15/18 with subsequent persistent bleeding. Uterine artery embolization on 7/21. Cervical suturing on 6/19, 7/9, 7/26.  Clinical Tests:   Lab Tests: Ordered and Reviewed  Medical Tests: Ordered and Reviewed  Other:   I have discussed this case with another health care provider.       APC / Resident Notes:   27-year-old  female with history of liver transplant in 1992 currently on Prograf and prednisone, ESRD on HD TTS, thrombocytopenia, persistent vaginal bleeding after LEEP procedure, uncontrolled hypertension, anemia presents to the ED complaining of persistent vaginal bleeding.  Tachycardic and hypertensive at 228/140.  Patient did not take any of her morning antihypertensives.  Will give him doses of all medications and recheck BP.  Murmur noted.  Abdomen soft and  nontender.  Fistula noted to the left lower arm with palpable thrill, no cellulitis tenderness. Differential diagnosis includes but is not limited to anemia, persistent vaginal bleeding after LEEP procedure, UTI.  Will get labs and discuss with gyn.    Anemia noted with H/H 6.1/19.6. Blood consent obtained and patient transfused 1 unit PRBC. Cr 8.4 - scheduled for dialysis today.    Discussed with GYN - scrubbed in surgery, they will call back.    Called and spoke with GYN - they will call back after talking to upper level, possibly transfer to [facility] for GYN eval/intervention.    12:05PM  Spoke with GYN. They will evaluate patient at [facility] ED. Likely admit to  at [facility] with GYN consult given complex medical history.     1:29 PM  Awaiting GYN eval.    GYN evaluated in the ED. No packing at this time. Recommend admission to [facility] hospital medicine for dialysis and HTN management. Once stabilized, plan to do exam under anesthesia at [facility] tomorrow or Thursday. Plan subject to change and they will follow along. Nephrology consulted and they will set up for dialysis. Will admit to internal medicine.                  Clinical Impression:   The primary encounter diagnosis was Vaginal bleeding. Diagnoses of Anemia, unspecified type, ESRD (end stage renal disease) on dialysis, and Hypertension, unspecified type were also pertinent to this visit.      Disposition:   Disposition: Admitted  Condition: Serious                        Desiree Gonzales PA-C  08/07/18 5790

## 2018-08-07 NOTE — CONSULTS
[facility]  Nephrology  Consult Note    Patient Name: Holly Patel  MRN: [id number]  Admission Date: 8/7/2018  Hospital Length of Stay: 0 days  Attending Provider: Haresh Sharp DO   Primary Care Physician: Stan Sosa MD  Principal Problem:Acute blood loss anemia    Inpatient consult to Nephrology  Consult performed by: OLIVE BLUE  Consult ordered by: YULISA GILLIAM  Reason for consult: ESRD        Subjective:     HPI: Ms. Patel is a 28 yo AAF with ESRD, HTN, liver transplant in 2012 who presents to [facility] for evaluation of vaginal bleeding.  She had a LEEP procedure in June 2018 and has been having vaginal bleeding since then.  She has been admitted between [facility] and [facility] 8 times since procedure for same complaint.  She was most recently admitted to [facility] 7/26-7/29 and was seen by gynecology .  She had EUA with Sturmdorf sutures placed on the 26th with improvement in her vaginal bleeding and later discharged on the 29th.  According to records, Gynecology believes the recurrent bleeding is 2/2 uncontrolled hypertension as patient is known to be non-complaint with diet and medication adherance.  In the ED, she was noted to have hypertensive urgency with BP of 228/140.  Gynecology has been consulted in the ED.  She reports saturating 2 pads hourly at the moment with bright red blood and clots reported.  She denies abdominal cramping, light-headedness, dizziness, Nausea or vomiting.  She reports compliance with her HD prescription, but missed today as she presented to the ED for evaluation.  She is HDS on exam, administered a unit of PRBC in the ED for a hgb of 6.1.  Nephrology was consulted for ESRD management.    She dialyzes at [facility] under the care of Dr. Bass.  She dialyzes for 3.5 hours and reports an EDW ~ 50 kg.  She has an AVF to her [facility].  She no longer makes urine.       Past Medical History:   Diagnosis Date    Anemia in ESRD (end-stage renal  disease) 10/12/2015    dialysis es, thursday, sat; access left arm    Chronic rejection of liver transplant 3/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 ()    Moderate protein-calorie malnutrition 2017    MRSA bacteremia 2017    Pneumonia     Prophylactic immunotherapy 2014    Renovascular hypertension 10/2/2015    Secondary hyperparathyroidism 2017    Seizures     Sialadenitis 3/21/2018    Thrombocytopenia 2016       Past Surgical History:   Procedure Laterality Date     SECTION      x 2    CONIZATION OF CERVIX USING LOOP ELECTROSURGICAL EXCISION PROCEDURE (LEEP) N/A 6/15/2018    Procedure: CONIZATION-CERVICAL-LEEP;  Surgeon: Neelam Marroquin MD;  Location: [facility];  Service: OB/GYN;  Laterality: N/A;    EMBOLIZATION N/A 2018    Procedure: EMBOLIZATION, BLOOD VESSEL;  Surgeon: Aren Ramos MD;  Location: [facility] CATH LAB;  Service: Radiology;  Laterality: N/A;    EXAMINATION UNDER ANESTHESIA N/A 2018    Procedure: Exam under anesthesia;  Surgeon: Neelam Marroquin MD;  Location: [facility] OR;  Service: OB/GYN;  Laterality: N/A;    EXAMINATION UNDER ANESTHESIA N/A 2018    Procedure: Exam under anesthesia (ADD ON );  Surgeon: NICKIE Alvarez MD;  Location: [facility] OR;  Service: OB/GYN;  Laterality: N/A;  (ADD ON )    EXAMINATION UNDER ANESTHESIA N/A 2018    Procedure: Exam under anesthesia -cervical suturing ;  Surgeon: Lei Sims III, MD;  Location: [facility] OR;  Service: OB/GYN;  Laterality: N/A;    LIVER BIOPSY      LIVER TRANSPLANT  1992     PERINEORRHAPHY  6/19/2018    Procedure: SUTURE REPAIR,CERVIX;  Surgeon: Neelam Marroquin MD;  Location: [facility];  Service: OB/GYN;;    TUBAL LIGATION  2010       Review of patient's allergies indicates:   Allergen Reactions    Chloral hydrate      Other reaction(s): Hallucinations  Other reaction(s): Hives     Current Facility-Administered Medications   Medication Frequency    0.9%  NaCl infusion (for blood administration) Q24H PRN    acetaminophen tablet 650 mg Q6H PRN    [START ON 8/9/2018] cinacalcet tablet 30 mg Every Tues, Thurs, Sat    [START ON 8/8/2018] citalopram tablet 20 mg Daily    [START ON 8/8/2018] cloNIDine 0.3 mg/24 hr td ptwk 1 patch Q7 Days    dextrose 50% injection 12.5 g PRN    dextrose 50% injection 25 g PRN    [START ON 8/8/2018] famotidine tablet 20 mg Daily    glucagon (human recombinant) injection 1 mg PRN    glucose chewable tablet 16 g PRN    glucose chewable tablet 24 g PRN    hydrALAZINE tablet 100 mg Q8H    labetalol tablet 400 mg Q8H    [START ON 8/8/2018] lisinopril tablet 40 mg Daily    mycophenolate capsule 1,000 mg BID    [START ON 8/8/2018] NIFEdipine 24 hr tablet 120 mg Daily    ondansetron tablet 4 mg Q6H PRN    [START ON 8/8/2018] predniSONE tablet 1 mg Every other day    sodium chloride 0.9% flush 5 mL PRN    tacrolimus capsule 7 mg BID     Current Outpatient Prescriptions   Medication    acetaminophen-codeine 300-30mg (TYLENOL #3) 300-30 mg Tab    aspirin 81 MG Chew    cinacalcet (SENSIPAR) 30 MG Tab    citalopram (CELEXA) 20 MG tablet    clindamycin (CLINDESSE) 2 % vaginal cream    cloNIDine 0.3 mg/24 hr td ptwk (CATAPRES) 0.3 mg/24 hr    conjugated estrogens (PREMARIN) vaginal cream    famotidine (PEPCID) 40 MG tablet    food supplemt, lactose-reduced (ENSURE ACTIVE HIGH PROTEIN) Liqd    labetalol (NORMODYNE) 200 MG tablet    lisinopril (PRINIVIL,ZESTRIL) 40 MG tablet    mycophenolate (CELLCEPT) 250 mg Cap    NIFEdipine (PROCARDIA-XL)  60 MG (OSM) 24 hr tablet    ondansetron (ZOFRAN) 4 MG tablet    predniSONE (DELTASONE) 1 MG tablet    tacrolimus (PROGRAF) 1 MG Cap    hydrALAZINE (APRESOLINE) 100 MG tablet    triamcinolone acetonide 0.1% (KENALOG) 0.1 % ointment     Family History     Problem Relation (Age of Onset)    Hypertension Mother, Father        Social History Main Topics    Smoking status: Never Smoker    Smokeless tobacco: Never Used    Alcohol use No    Drug use: No    Sexual activity: No     Review of Systems   Constitutional: Negative for activity change, chills, fatigue and fever.   HENT: Negative for congestion, facial swelling, nosebleeds, trouble swallowing and voice change.    Respiratory: Negative for cough, chest tightness and shortness of breath.    Cardiovascular: Negative for chest pain, palpitations and leg swelling.   Gastrointestinal: Negative for abdominal distention, anal bleeding, constipation, diarrhea and nausea.   Genitourinary: Positive for vaginal bleeding.   Musculoskeletal: Negative for arthralgias, gait problem and joint swelling.   Neurological: Negative for dizziness, syncope and headaches.   Psychiatric/Behavioral: Negative for agitation, behavioral problems and confusion.     Objective:     Vital Signs (Most Recent):  Temp: 98.2 °F (36.8 °C) (08/07/18 0950)  Pulse: (!) 112 (08/07/18 1432)  Resp: 17 (08/07/18 0950)  BP: (!) 155/99 (08/07/18 1432)  SpO2: 100 % (08/07/18 1432)  O2 Device (Oxygen Therapy): room air (08/07/18 0713) Vital Signs (24h Range):  Temp:  [98.2 °F (36.8 °C)-98.3 °F (36.8 °C)] 98.2 °F (36.8 °C)  Pulse:  [101-112] 112  Resp:  [17-18] 17  SpO2:  [97 %-100 %] 100 %  BP: (155-228)/() 155/99     Weight: 52.6 kg (116 lb) (08/07/18 0713)  Body mass index is 19.3 kg/m².  Body surface area is 1.55 meters squared.    No intake/output data recorded.    Physical Exam   Constitutional: She is oriented to person, place, and time. She appears well-developed and well-nourished. No  distress.   HENT:   Head: Normocephalic and atraumatic.   Right Ear: External ear normal.   Left Ear: External ear normal.   Eyes: Conjunctivae and EOM are normal. Right eye exhibits no discharge. Left eye exhibits no discharge. No scleral icterus.   Neck: Normal range of motion. Neck supple. No tracheal deviation present.   Cardiovascular: Normal rate, regular rhythm, normal heart sounds and intact distal pulses.  Exam reveals no gallop and no friction rub.    No murmur heard.  Pulmonary/Chest: Effort normal and breath sounds normal. No stridor. No respiratory distress. She has no wheezes. She has no rales.   Abdominal: Soft. Bowel sounds are normal. She exhibits no distension and no mass. There is no tenderness. There is no guarding.   Neurological: She is alert and oriented to person, place, and time.   Skin: Skin is warm and dry. She is not diaphoretic.   Psychiatric: She has a normal mood and affect. Her behavior is normal. Judgment and thought content normal.   Nursing note and vitals reviewed.      Significant Labs:  CBC:   Recent Labs  Lab 08/07/18  0806   WBC 4.13   RBC 2.24*   HGB 6.1*   HCT 19.6*   PLT 72*   MCV 88   MCH 27.2   MCHC 31.1*     CMP:   Recent Labs  Lab 08/07/18  0806   GLU 90   CALCIUM 9.0   ALBUMIN 2.5*   PROT 7.0      K 4.2   CO2 26      BUN 48*   CREATININE 8.4*   ALKPHOS 663*   ALT 33   AST 43*   BILITOT 0.7         Assessment/Plan:     ESRD on hemodialysis    ESRD on iHD [facility]  3:30  ~50 kg      Plan/Recommendations:  No urgent need for RRT today. Volume status/electrolytes stable.  Will plan for HD in the AM for metabolic clearance/volume management.    Recommend to start patient on her home BP medications.    RFP/CBC in AM.  Recommend to transfuse additional units of PRBC while on HD if indicated.  Continue cinacalcet per OP schedule.              Raheem Trujillo NP  Nephrology  [facility]  Pager:  [phone]

## 2018-08-07 NOTE — ED NOTES
Pt identifiers Holly Patel were checked and correct  LOC: The patient is awake, alert, aware of environment with an appropriate affect. Oriented x3, speaking appropriately  APPEARANCE: Pt resting comfortably, in no acute distress, pt is clean and well groomed, clothing properly fastened  SKIN: Skin warm, dry and intact, normal skin turgor, moist mucus membranes  RESPIRATORY: Airway is open and patent, respirations are spontaneous, even and unlabored, normal effort and rate  CARDIAC: Normal rate and rhythm, no peripheral edema noted, capillary refill < 3 seconds, bilateral radial pulses 2+  ABDOMEN: Soft, non tender, non distended. Bowel sounds present x 4 quadrants.   NEUROLOGIC: PERRL, facial expression is symmetrical, patient moving all extremities spontaneously, normal sensation in all extremities when touched with a finger.  Follows all commands appropriately  MUSCULOSKELETAL: No obvious deformities.

## 2018-08-07 NOTE — ASSESSMENT & PLAN NOTE
- hepatology consult for immunosuppression management  - continue home dose for now of prograf and prednisone  - daily prograf levels

## 2018-08-07 NOTE — ED NOTES
Pt presented to the ED via pov. Pt states on June 15th, pt had the LEEP procedure done since then she 14 units of blood. Pt states she has went back to [facility] for stiches 4 times. Pt states she is soaking 2 pads an hour. Pt denies sob. Pt denies chest pain. Mother at the bedside. Hx of liver transplant.

## 2018-08-07 NOTE — ASSESSMENT & PLAN NOTE
- has been an ongoing problem due to vaginal bleeding after LEEP procedure  - gyn consulted  - transfuse to keep Hb > 7

## 2018-08-07 NOTE — CONSULTS
Consult Note  Gynecology    Consult Requested By: ED  Reason for Consult: persistent vaginal bleeding after LEEP    SUBJECTIVE:     History of Present Illness:  Holly Patel is a 27 y.o.  with PMHx significant for liver transplant on chronic immunosuppression, poorly controlled HTN, ESRD on HD, and known non-compliance who presents in the ED with continued vaginal bleeding. Ms. Patel is well-known to GYN service. Patient had a LEEP on 6/15/18 and has had intermittent issues with bleeding since then. Postop course as follows:  -  --Presented with postop bleeding. She had EUA with cervical artery ligation and oversewing of anterior edge of LEEP bed. She received 1 pack platelets and 1u pRBC.   -  --Admission for HTN emergency managed with cardine gtt at [facility], bleeding remained stable. Clonidine patch added for BP.  -  --Heavy bleeding which was managed with monsels and vaginal packing, as her BP was too high to safely operate. BP managed with cardine gtt and lisinopril added.   -  --Admitted for symptomatic anemia requiring 4u pRBC. Bleeding stable, did not require surgical management. Procardia increased.   - -10 --Presented with ongoing bleeding, managed with EUA with cervical artery ligation and running locked sutures around LEEP perimeter anteriorly and posteriorly. Received 2u pRBCs. Continued on home meds which controlled BP once taking.  - - --Bleeding managed with monsels packing and UAE per IR on . Received 4u pRBC for symptomatic anemia. Resuming home medication regimen and receiving HD controlled BP.  -  --Bleeding managed with EUA with modified Sturmdorf sutures and premarin cream for vaginal atrophy. Also prescribed vaginal clindamycin which she did not pickup from the pharmacy. Resuming home medication regimen and receiving HD controlled BP.    Patient presents again today with persistent bleeding, this is her 8th admission since LEEP.  "States it has been "about the same" since shortly after her last discharge, using 1-2 pads per hour. Pads viewed by GYN team today with what patient reports is the usual amount of saturation, only about 40% full on one pad. Reports she came in because bleeding has not improved over the past few days. Denies dizziness, lightheadedness, SOB, HA. Denies purulent vaginal discharge, fever, chills, nausea, vomiting. Reports compliance with all meds except for this AM's antihypertensives because she needed to come to the hospital. Reports using the premarin cream that we gave her when she left the hospital but did not pickup the clindamycin cream from the pharmacy.    LMP 2017, does not get menses anymore due to chronic illness    Review of patient's allergies indicates:   Allergen Reactions    Chloral hydrate      Other reaction(s): Hallucinations  Other reaction(s): Hives     Past Medical History:   Diagnosis Date    Anemia in ESRD (end-stage renal disease) 10/12/2015    dialysis tues, thursday, sat; access left arm    Chronic rejection of liver transplant 3/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 ()    Moderate protein-calorie malnutrition 2017    MRSA bacteremia 2017    Pneumonia     Prophylactic immunotherapy 2014    Renovascular hypertension 10/2/2015    Secondary hyperparathyroidism 2017    Seizures     Sialadenitis 3/21/2018    Thrombocytopenia 2016     Past Surgical History:   Procedure Laterality Date     SECTION      x 2    CONIZATION OF CERVIX USING LOOP " ELECTROSURGICAL EXCISION PROCEDURE (LEEP) N/A 6/15/2018    Procedure: CONIZATION-CERVICAL-LEEP;  Surgeon: Neelam Marroquin MD;  Location: [facility] [place] OR;  Service: OB/GYN;  Laterality: N/A;    EMBOLIZATION N/A 2018    Procedure: EMBOLIZATION, BLOOD VESSEL;  Surgeon: Aren Ramos MD;  Location: [facility], [place] CATH LAB;  Service: Radiology;  Laterality: N/A;    EXAMINATION UNDER ANESTHESIA N/A 2018    Procedure: Exam under anesthesia;  Surgeon: Neelam Marroquin MD;  Location: [facility] [place] OR;  Service: OB/GYN;  Laterality: N/A;    EXAMINATION UNDER ANESTHESIA N/A 2018    Procedure: Exam under anesthesia (ADD ON );  Surgeon: NICKIE Alvarez MD;  Location: [facility] [place] OR;  Service: OB/GYN;  Laterality: N/A;  (ADD ON )    EXAMINATION UNDER ANESTHESIA N/A 2018    Procedure: Exam under anesthesia -cervical suturing ;  Surgeon: Lei Sims III, MD;  Location: [facility];  Service: OB/GYN;  Laterality: N/A;    LIVER BIOPSY      LIVER TRANSPLANT  1992    PERINEORRHAPHY  2018    Procedure: SUTURE REPAIR,CERVIX;  Surgeon: Neelam Marroquin MD;  Location: [facility];  Service: OB/GYN;;    TUBAL LIGATION       OB History      Para Term  AB Living    2 2 0 2 0 2    SAB TAB Ectopic Multiple Live Births    0 0 0   2        Family History   Problem Relation Age of Onset    Hypertension Mother     Hypertension Father     Melanoma Neg Hx     Breast cancer Neg Hx     Colon cancer Neg Hx     Ovarian cancer Neg Hx      Social History     Social History    Marital status: Legally      Spouse name: N/A    Number of children: N/A    Years of education: N/A     Occupational History    Not on file.     Social History Main Topics    Smoking status: Never Smoker    Smokeless tobacco: Never Used    Alcohol use No    Drug use: No    Sexual activity: No     Other Topics Concern    Are You Pregnant Or Think You May Be? No    Breast-Feeding No     Social History Narrative    Lives 2  kids, 7 and 8, and nephew. Her mom helps with kids.     Current Facility-Administered Medications   Medication    0.9%  NaCl infusion (for blood administration)    acetaminophen tablet 650 mg    [START ON 8/9/2018] cinacalcet tablet 30 mg    [START ON 8/8/2018] citalopram tablet 20 mg    [START ON 8/8/2018] cloNIDine 0.3 mg/24 hr td ptwk 1 patch    dextrose 50% injection 12.5 g    dextrose 50% injection 25 g    [START ON 8/8/2018] famotidine tablet 20 mg    glucagon (human recombinant) injection 1 mg    glucose chewable tablet 16 g    glucose chewable tablet 24 g    hydrALAZINE tablet 100 mg    labetalol tablet 400 mg    [START ON 8/8/2018] lisinopril tablet 40 mg    mycophenolate capsule 1,000 mg    [START ON 8/8/2018] NIFEdipine 24 hr tablet 120 mg    ondansetron tablet 4 mg    [START ON 8/8/2018] predniSONE tablet 1 mg    sodium chloride 0.9% flush 5 mL    tacrolimus capsule 7 mg       Review of Systems:  Constitutional: no significant weight change, fever, fatigue  Eyes:  No vision changes  Cardiovascular: No chest pain  Respiratory: No shortness of breath or cough  Gastrointestinal: No diarrhea, bloody stool, nausea/vomiting, constipation  Genitourinary: No blood in urine, painful urination, urgency of urination, frequency of urination, see HPI  Skin/Breast: No painful breasts, nipple discharge, masses  Neurological: No headache  Endocrine: No hot flushes  Psychiatric: No depression or anxiety     OBJECTIVE:     Vital Signs  Temp:  [97.4 °F (36.3 °C)-98.3 °F (36.8 °C)] 97.4 °F (36.3 °C)  Pulse:  [101-112] 109  Resp:  [17-18] 18  SpO2:  [97 %-100 %] 99 %  BP: (155-228)/() 200/140    Physical Exam:  Gen: A&Ox3, NAD  CV: mildly tachycardic, regular rhythm  Pulm: no respiratory distress  Abd: Soft, non-distended, non-tender to palpation without rebound or guarding  Ext: PPP, no peripheral edema  External genitalia: WNL  Urethra and Meatus: WNL  Vagina: atrophic, small amount of dark blood  present in vault  Cervix: significant atrophy, necrotic tissue, and eschar noted over entire face of cervix, no active bright red bleeding noted, persistent oozing from perimeter of necrotic area. No purulent vaginal discharge visible; however, very strong foul odor noted.  Uterus: deferred  Adnexa: deferred    Laboratory  Recent Results (from the past 24 hour(s))   CBC auto differential    Collection Time: 08/07/18  8:06 AM   Result Value Ref Range    WBC 4.13 3.90 - 12.70 K/uL    RBC 2.24 (L) 4.00 - 5.40 M/uL    Hemoglobin 6.1 (L) 12.0 - 16.0 g/dL    Hematocrit 19.6 (LL) 37.0 - 48.5 %    MCV 88 82 - 98 fL    MCH 27.2 27.0 - 31.0 pg    MCHC 31.1 (L) 32.0 - 36.0 g/dL    RDW 15.8 (H) 11.5 - 14.5 %    Platelets 72 (L) 150 - 350 K/uL    MPV 10.0 9.2 - 12.9 fL    Immature Granulocytes 0.5 0.0 - 0.5 %    Gran # (ANC) 3.1 1.8 - 7.7 K/uL    Immature Grans (Abs) 0.02 0.00 - 0.04 K/uL    Lymph # 0.5 (L) 1.0 - 4.8 K/uL    Mono # 0.2 (L) 0.3 - 1.0 K/uL    Eos # 0.4 0.0 - 0.5 K/uL    Baso # 0.01 0.00 - 0.20 K/uL    nRBC 0 0 /100 WBC    Gran% 75.3 (H) 38.0 - 73.0 %    Lymph% 11.6 (L) 18.0 - 48.0 %    Mono% 3.9 (L) 4.0 - 15.0 %    Eosinophil% 8.5 (H) 0.0 - 8.0 %    Basophil% 0.2 0.0 - 1.9 %    Differential Method Automated    Comprehensive metabolic panel    Collection Time: 08/07/18  8:06 AM   Result Value Ref Range    Sodium 139 136 - 145 mmol/L    Potassium 4.2 3.5 - 5.1 mmol/L    Chloride 103 95 - 110 mmol/L    CO2 26 23 - 29 mmol/L    Glucose 90 70 - 110 mg/dL    BUN, Bld 48 (H) 6 - 20 mg/dL    Creatinine 8.4 (H) 0.5 - 1.4 mg/dL    Calcium 9.0 8.7 - 10.5 mg/dL    Total Protein 7.0 6.0 - 8.4 g/dL    Albumin 2.5 (L) 3.5 - 5.2 g/dL    Total Bilirubin 0.7 0.1 - 1.0 mg/dL    Alkaline Phosphatase 663 (H) 55 - 135 U/L    AST 43 (H) 10 - 40 U/L    ALT 33 10 - 44 U/L    Anion Gap 10 8 - 16 mmol/L    eGFR if African American 6.8 (A) >60 mL/min/1.73 m^2    eGFR if non African American 5.9 (A) >60 mL/min/1.73 m^2   Type & Screen     Collection Time: 08/07/18  8:06 AM   Result Value Ref Range    Group & Rh B POS     Indirect Frederic NEG    Protime-INR    Collection Time: 08/07/18  8:06 AM   Result Value Ref Range    Prothrombin Time 11.7 9.0 - 12.5 sec    INR 1.1 0.8 - 1.2   Prepare RBC 1 Unit    Collection Time: 08/07/18  8:06 AM   Result Value Ref Range    UNIT NUMBER [id number]     PRODUCT CODE O1863W99     DISPENSE STATUS ISSUED     CODING SYSTEM [id number]     Unit Blood Type Code 7300     Unit Blood Type B POS     Unit Expiration [id number]        Diagnostic Results:  None new    ASSESSMENT/PLAN:     Active Hospital Problems    Diagnosis  POA    *Acute blood loss anemia [D62]  Yes    Vaginal bleeding [N93.9]  Yes    Thrombocytopenia [D69.6]  Yes    Anemia in ESRD (end-stage renal disease) [N18.6, D63.1]  Yes     Chronic    Renovascular hypertension [I15.0]  Yes     Chronic    ESRD on hemodialysis [N18.6, Z99.2]  Not Applicable     Chronic    Prophylactic immunotherapy [Z29.8]  Not Applicable   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 [Z94.4]  Not Applicable     Chronic     hemangioendothelioma s/p LTx (1992)        Resolved Hospital Problems    Diagnosis Date Resolved POA   No resolved problems to display.       Persistent postop vaginal bleeding after LEEP 6/15/18  - given pattern of severe HTN and bleeding, strongly feel that persistent bleeding is due to uncontrolled HTN, likely due to non-compliance with medications and HD  - not hemorrhaging per vagina at this time, plan for EUA tomorrow with potential use of endoloop to control cervical bleeding. Case request placed and OR desk notified.  - recommend flagyl 500 mg BID for vaginal infection on exam  - continue premarin cream 2g nightly  - transfusions and medical management per primary team, appreciate any help with BP control and compliance strategies  - consider SW consult to explore options for long-term care to keep BP under control while cervix is healing to prevent frequent  readmissions that many services feel are related to non-compliance at home and with HD schedule    GYN will continue to follow. Please call with questions or acute change in bleeding.  D/w GYN and GYN ONC staff regarding plan    Roberta Ruiz MD  OBGYN - PGY 4     CASE HAD BEEN DISCUSSED EXTENSIVELY WITH INVOLVED STAFF AND GYN ONCOLOGY, DR DORSEY.  SET PLAN FOR MGMT WITH VAGINAL PACKING IN EVENT OF FURTHER BLEEDING.  ADVISED CHIEF RESIDENT THAT IF PACKING NEEDED, I WOULD PREFER PACKING WITH PREMARIN-COATED KERLEX TO AVOID REMOVING CLOT WHEN PACKING REMOVED.  PREMARIN MAY ALSO IMPROVE CERVICAL AND VAGINAL TISSUES TO PROMOTE HEALING.  IN SIMILAR CASES OF PERSISTENT BLEEDING FROM THE CERVIX, I HAVE HAD SUCCESS PLACING AN ENDO-LOOP AROUND THE CERVIX TO SIGNIFICANTLY REDUCE PULSE PRESSURE TO THE CERVICAL BED.  SO LONG AS HER BLOOD PRESSURE IS EXTREMELY ELEVATED, HOWEVER, IT WILL BE DIFFICULT TO GAIN CONTROL OF BLEEDING FROM THE CAUTERIZED SURFACE OF THE CERVIX.    PLAN DISCUSSED WITH PT

## 2018-08-08 ENCOUNTER — ANESTHESIA (OUTPATIENT)
Dept: SURGERY | Facility: HOSPITAL | Age: 28
DRG: 919 | End: 2018-08-08
Payer: MEDICARE

## 2018-08-08 PROBLEM — N76.0 VAGINAL INFECTION: Status: ACTIVE | Noted: 2018-08-08

## 2018-08-08 LAB
ALBUMIN SERPL BCP-MCNC: 2.6 G/DL
ALP SERPL-CCNC: 663 U/L
ALT SERPL W/O P-5'-P-CCNC: 30 U/L
ANION GAP SERPL CALC-SCNC: 13 MMOL/L
AST SERPL-CCNC: 33 U/L
BASOPHILS # BLD AUTO: 0.02 K/UL
BASOPHILS NFR BLD: 0.4 %
BILIRUB SERPL-MCNC: 0.6 MG/DL
BUN SERPL-MCNC: 57 MG/DL
CALCIUM SERPL-MCNC: 8.8 MG/DL
CHLORIDE SERPL-SCNC: 100 MMOL/L
CO2 SERPL-SCNC: 23 MMOL/L
CREAT SERPL-MCNC: 10.1 MG/DL
DIFFERENTIAL METHOD: ABNORMAL
EOSINOPHIL # BLD AUTO: 0.5 K/UL
EOSINOPHIL NFR BLD: 8.8 %
ERYTHROCYTE [DISTWIDTH] IN BLOOD BY AUTOMATED COUNT: 15.6 %
EST. GFR  (AFRICAN AMERICAN): 5.4 ML/MIN/1.73 M^2
EST. GFR  (NON AFRICAN AMERICAN): 4.7 ML/MIN/1.73 M^2
GLUCOSE SERPL-MCNC: 109 MG/DL
HCG INTACT+B SERPL-ACNC: 1.6 MIU/ML
HCT VFR BLD AUTO: 22.4 %
HGB BLD-MCNC: 7.4 G/DL
IMM GRANULOCYTES # BLD AUTO: 0.03 K/UL
IMM GRANULOCYTES NFR BLD AUTO: 0.5 %
LYMPHOCYTES # BLD AUTO: 0.7 K/UL
LYMPHOCYTES NFR BLD: 11.8 %
MAGNESIUM SERPL-MCNC: 2.3 MG/DL
MCH RBC QN AUTO: 28.4 PG
MCHC RBC AUTO-ENTMCNC: 33 G/DL
MCV RBC AUTO: 86 FL
MONOCYTES # BLD AUTO: 0.3 K/UL
MONOCYTES NFR BLD: 4.9 %
NEUTROPHILS # BLD AUTO: 4.2 K/UL
NEUTROPHILS NFR BLD: 73.6 %
NRBC BLD-RTO: 0 /100 WBC
PHOSPHATE SERPL-MCNC: 5 MG/DL
PLATELET # BLD AUTO: 82 K/UL
PMV BLD AUTO: 11.2 FL
POTASSIUM SERPL-SCNC: 4.5 MMOL/L
PROT SERPL-MCNC: 7.1 G/DL
RBC # BLD AUTO: 2.61 M/UL
SODIUM SERPL-SCNC: 136 MMOL/L
TACROLIMUS BLD-MCNC: 3.2 NG/ML
WBC # BLD AUTO: 5.67 K/UL

## 2018-08-08 PROCEDURE — 90935 HEMODIALYSIS ONE EVALUATION: CPT | Mod: ,,, | Performed by: INTERNAL MEDICINE

## 2018-08-08 PROCEDURE — 25000003 PHARM REV CODE 250: Performed by: HOSPITALIST

## 2018-08-08 PROCEDURE — 80197 ASSAY OF TACROLIMUS: CPT

## 2018-08-08 PROCEDURE — 90935 HEMODIALYSIS ONE EVALUATION: CPT

## 2018-08-08 PROCEDURE — 25000003 PHARM REV CODE 250: Performed by: NURSE ANESTHETIST, CERTIFIED REGISTERED

## 2018-08-08 PROCEDURE — 63600175 PHARM REV CODE 636 W HCPCS: Performed by: HOSPITALIST

## 2018-08-08 PROCEDURE — 84100 ASSAY OF PHOSPHORUS: CPT

## 2018-08-08 PROCEDURE — 25000003 PHARM REV CODE 250: Performed by: STUDENT IN AN ORGANIZED HEALTH CARE EDUCATION/TRAINING PROGRAM

## 2018-08-08 PROCEDURE — D9220A PRA ANESTHESIA: Mod: ANES,,, | Performed by: ANESTHESIOLOGY

## 2018-08-08 PROCEDURE — 94761 N-INVAS EAR/PLS OXIMETRY MLT: CPT

## 2018-08-08 PROCEDURE — 36000704 HC OR TIME LEV I 1ST 15 MIN: Performed by: OBSTETRICS & GYNECOLOGY

## 2018-08-08 PROCEDURE — 37000008 HC ANESTHESIA 1ST 15 MINUTES: Performed by: OBSTETRICS & GYNECOLOGY

## 2018-08-08 PROCEDURE — 25000003 PHARM REV CODE 250: Performed by: OBSTETRICS & GYNECOLOGY

## 2018-08-08 PROCEDURE — 84702 CHORIONIC GONADOTROPIN TEST: CPT

## 2018-08-08 PROCEDURE — 63600175 PHARM REV CODE 636 W HCPCS: Performed by: NURSE ANESTHETIST, CERTIFIED REGISTERED

## 2018-08-08 PROCEDURE — 99233 SBSQ HOSP IP/OBS HIGH 50: CPT | Mod: ,,, | Performed by: HOSPITALIST

## 2018-08-08 PROCEDURE — 71000039 HC RECOVERY, EACH ADD'L HOUR: Performed by: OBSTETRICS & GYNECOLOGY

## 2018-08-08 PROCEDURE — 11000001 HC ACUTE MED/SURG PRIVATE ROOM

## 2018-08-08 PROCEDURE — 85025 COMPLETE CBC W/AUTO DIFF WBC: CPT

## 2018-08-08 PROCEDURE — 83735 ASSAY OF MAGNESIUM: CPT

## 2018-08-08 PROCEDURE — 36000705 HC OR TIME LEV I EA ADD 15 MIN: Performed by: OBSTETRICS & GYNECOLOGY

## 2018-08-08 PROCEDURE — 80053 COMPREHEN METABOLIC PANEL: CPT

## 2018-08-08 PROCEDURE — 36415 COLL VENOUS BLD VENIPUNCTURE: CPT

## 2018-08-08 PROCEDURE — S0030 INJECTION, METRONIDAZOLE: HCPCS | Performed by: NURSE ANESTHETIST, CERTIFIED REGISTERED

## 2018-08-08 PROCEDURE — D9220A PRA ANESTHESIA: Mod: CRNA,,, | Performed by: NURSE ANESTHETIST, CERTIFIED REGISTERED

## 2018-08-08 PROCEDURE — 0UJD7ZZ INSPECTION OF UTERUS AND CERVIX, VIA NATURAL OR ARTIFICIAL OPENING: ICD-10-PCS | Performed by: OBSTETRICS & GYNECOLOGY

## 2018-08-08 PROCEDURE — 25000003 PHARM REV CODE 250: Performed by: NURSE PRACTITIONER

## 2018-08-08 PROCEDURE — 37000009 HC ANESTHESIA EA ADD 15 MINS: Performed by: OBSTETRICS & GYNECOLOGY

## 2018-08-08 PROCEDURE — 71000033 HC RECOVERY, INTIAL HOUR: Performed by: OBSTETRICS & GYNECOLOGY

## 2018-08-08 PROCEDURE — 99223 1ST HOSP IP/OBS HIGH 75: CPT | Mod: GC,,, | Performed by: INTERNAL MEDICINE

## 2018-08-08 PROCEDURE — 57415 REMOVE VAGINAL FOREIGN BODY: CPT | Mod: 78,,, | Performed by: OBSTETRICS & GYNECOLOGY

## 2018-08-08 PROCEDURE — 0UHH7YZ INSERTION OF OTHER DEVICE INTO VAGINA AND CUL-DE-SAC, VIA NATURAL OR ARTIFICIAL OPENING: ICD-10-PCS | Performed by: OBSTETRICS & GYNECOLOGY

## 2018-08-08 PROCEDURE — 0UJH7ZZ INSPECTION OF VAGINA AND CUL-DE-SAC, VIA NATURAL OR ARTIFICIAL OPENING: ICD-10-PCS | Performed by: OBSTETRICS & GYNECOLOGY

## 2018-08-08 PROCEDURE — 27000221 HC OXYGEN, UP TO 24 HOURS

## 2018-08-08 RX ORDER — FENTANYL CITRATE 50 UG/ML
INJECTION, SOLUTION INTRAMUSCULAR; INTRAVENOUS
Status: DISCONTINUED | OUTPATIENT
Start: 2018-08-08 | End: 2018-08-08

## 2018-08-08 RX ORDER — HYDROMORPHONE HYDROCHLORIDE 1 MG/ML
0.2 INJECTION, SOLUTION INTRAMUSCULAR; INTRAVENOUS; SUBCUTANEOUS EVERY 5 MIN PRN
Status: DISCONTINUED | OUTPATIENT
Start: 2018-08-08 | End: 2018-08-08 | Stop reason: HOSPADM

## 2018-08-08 RX ORDER — PROPOFOL 10 MG/ML
VIAL (ML) INTRAVENOUS
Status: DISCONTINUED | OUTPATIENT
Start: 2018-08-08 | End: 2018-08-08

## 2018-08-08 RX ORDER — OXYCODONE HYDROCHLORIDE 5 MG/1
10 TABLET ORAL EVERY 4 HOURS PRN
Status: DISCONTINUED | OUTPATIENT
Start: 2018-08-08 | End: 2018-08-16 | Stop reason: HOSPADM

## 2018-08-08 RX ORDER — SODIUM CHLORIDE 0.9 % (FLUSH) 0.9 %
3 SYRINGE (ML) INJECTION
Status: DISCONTINUED | OUTPATIENT
Start: 2018-08-08 | End: 2018-08-08 | Stop reason: HOSPADM

## 2018-08-08 RX ORDER — LIDOCAINE HCL/PF 100 MG/5ML
SYRINGE (ML) INTRAVENOUS
Status: DISCONTINUED | OUTPATIENT
Start: 2018-08-08 | End: 2018-08-08

## 2018-08-08 RX ORDER — ROCURONIUM BROMIDE 10 MG/ML
INJECTION, SOLUTION INTRAVENOUS
Status: DISCONTINUED | OUTPATIENT
Start: 2018-08-08 | End: 2018-08-08

## 2018-08-08 RX ORDER — OXYCODONE HYDROCHLORIDE 5 MG/1
10 TABLET ORAL ONCE
Status: COMPLETED | OUTPATIENT
Start: 2018-08-08 | End: 2018-08-08

## 2018-08-08 RX ORDER — DIPHENHYDRAMINE HYDROCHLORIDE 50 MG/ML
25 INJECTION INTRAMUSCULAR; INTRAVENOUS EVERY 6 HOURS PRN
Status: DISCONTINUED | OUTPATIENT
Start: 2018-08-08 | End: 2018-08-08 | Stop reason: HOSPADM

## 2018-08-08 RX ORDER — SODIUM CHLORIDE 9 MG/ML
INJECTION, SOLUTION INTRAVENOUS ONCE
Status: COMPLETED | OUTPATIENT
Start: 2018-08-08 | End: 2018-08-08

## 2018-08-08 RX ORDER — ONDANSETRON 2 MG/ML
INJECTION INTRAMUSCULAR; INTRAVENOUS
Status: DISCONTINUED | OUTPATIENT
Start: 2018-08-08 | End: 2018-08-08

## 2018-08-08 RX ORDER — METRONIDAZOLE 500 MG/1
500 TABLET ORAL EVERY 12 HOURS
Status: COMPLETED | OUTPATIENT
Start: 2018-08-08 | End: 2018-08-15

## 2018-08-08 RX ORDER — SODIUM CHLORIDE 9 MG/ML
INJECTION, SOLUTION INTRAVENOUS CONTINUOUS PRN
Status: DISCONTINUED | OUTPATIENT
Start: 2018-08-08 | End: 2018-08-08

## 2018-08-08 RX ORDER — SUCCINYLCHOLINE CHLORIDE 20 MG/ML
INJECTION INTRAMUSCULAR; INTRAVENOUS
Status: DISCONTINUED | OUTPATIENT
Start: 2018-08-08 | End: 2018-08-08

## 2018-08-08 RX ORDER — METRONIDAZOLE 500 MG/100ML
INJECTION, SOLUTION INTRAVENOUS
Status: DISCONTINUED | OUTPATIENT
Start: 2018-08-08 | End: 2018-08-08

## 2018-08-08 RX ADMIN — PROPOFOL 150 MG: 10 INJECTION, EMULSION INTRAVENOUS at 12:08

## 2018-08-08 RX ADMIN — TACROLIMUS 7 MG: 5 CAPSULE ORAL at 07:08

## 2018-08-08 RX ADMIN — LABETALOL HCL 400 MG: 200 TABLET, FILM COATED ORAL at 05:08

## 2018-08-08 RX ADMIN — CLONIDINE 1 PATCH: 0.3 PATCH TRANSDERMAL at 10:08

## 2018-08-08 RX ADMIN — PROPOFOL 50 MG: 10 INJECTION, EMULSION INTRAVENOUS at 12:08

## 2018-08-08 RX ADMIN — OXYCODONE HYDROCHLORIDE 10 MG: 5 TABLET ORAL at 03:08

## 2018-08-08 RX ADMIN — HYDRALAZINE HYDROCHLORIDE 100 MG: 50 TABLET ORAL at 08:08

## 2018-08-08 RX ADMIN — HYDRALAZINE HYDROCHLORIDE 100 MG: 50 TABLET ORAL at 03:08

## 2018-08-08 RX ADMIN — SUCCINYLCHOLINE CHLORIDE 120 MG: 20 INJECTION, SOLUTION INTRAMUSCULAR; INTRAVENOUS at 12:08

## 2018-08-08 RX ADMIN — TACROLIMUS 7 MG: 5 CAPSULE ORAL at 08:08

## 2018-08-08 RX ADMIN — FAMOTIDINE 20 MG: 20 TABLET ORAL at 08:08

## 2018-08-08 RX ADMIN — ROCURONIUM BROMIDE 5 MG: 10 INJECTION, SOLUTION INTRAVENOUS at 12:08

## 2018-08-08 RX ADMIN — OXYCODONE HYDROCHLORIDE 5 MG: 5 TABLET ORAL at 03:08

## 2018-08-08 RX ADMIN — LIDOCAINE HYDROCHLORIDE 100 MG: 20 INJECTION, SOLUTION INTRAVENOUS at 12:08

## 2018-08-08 RX ADMIN — METRONIDAZOLE 500 MG: 500 TABLET ORAL at 09:08

## 2018-08-08 RX ADMIN — OXYCODONE HYDROCHLORIDE 5 MG: 5 TABLET ORAL at 01:08

## 2018-08-08 RX ADMIN — SODIUM CHLORIDE: 0.9 INJECTION, SOLUTION INTRAVENOUS at 10:08

## 2018-08-08 RX ADMIN — HYDRALAZINE HYDROCHLORIDE 100 MG: 50 TABLET ORAL at 01:08

## 2018-08-08 RX ADMIN — CITALOPRAM HYDROBROMIDE 20 MG: 20 TABLET ORAL at 08:08

## 2018-08-08 RX ADMIN — NIFEDIPINE 120 MG: 30 TABLET, FILM COATED, EXTENDED RELEASE ORAL at 08:08

## 2018-08-08 RX ADMIN — LISINOPRIL 40 MG: 20 TABLET ORAL at 08:08

## 2018-08-08 RX ADMIN — FENTANYL CITRATE 100 MCG: 50 INJECTION, SOLUTION INTRAMUSCULAR; INTRAVENOUS at 12:08

## 2018-08-08 RX ADMIN — METRONIDAZOLE 100 ML: 500 SOLUTION INTRAVENOUS at 12:08

## 2018-08-08 RX ADMIN — PREDNISONE 1 MG: 1 TABLET ORAL at 08:08

## 2018-08-08 RX ADMIN — LABETALOL HCL 400 MG: 200 TABLET, FILM COATED ORAL at 10:08

## 2018-08-08 RX ADMIN — LABETALOL HCL 400 MG: 200 TABLET, FILM COATED ORAL at 02:08

## 2018-08-08 RX ADMIN — ONDANSETRON 4 MG: 2 INJECTION INTRAMUSCULAR; INTRAVENOUS at 12:08

## 2018-08-08 RX ADMIN — SODIUM CHLORIDE: 0.9 INJECTION, SOLUTION INTRAVENOUS at 12:08

## 2018-08-08 NOTE — ASSESSMENT & PLAN NOTE
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 Hepatology is being consulted for management of immunosuppression.     Unclear if the patient is taking cellcept at this time. Would hold now and we will verify dosing and whether she should be on it. Her Tacrolimus level is 3.2, likely due to non-compliance. Will continue tacrolimus at current dose of 7/7 and continue prednisone EOD     Recommendations:  --Daily CMP, CBC, and INR  --Daily Tacrolimus  --Continue tacrolimus 7/7 current dose  --hold Cellcept for now  --If the patient is actively bleeding will recommend DDAVP as she likely has platelet dysfunction on top of chronic thrombocytopenia. Will consider splenic embolization for thrombocytoopenia in attempt to decrease her chronic risk of bleeding.

## 2018-08-08 NOTE — BRIEF OP NOTE
[facility]  Brief Operative Note    SUMMARY     Surgery Date: 8/8/2018     Surgeon(s) and Role:     * Neelam Marroquin MD - Primary     * Linn Anderson MD - Resident - Assisting    Pre-op Diagnosis:  Postoperative vaginal bleeding following genitourinary procedure [N99.820]    Post-op Diagnosis:   Same     Procedure(s) (LRB):  Exam Under Anesthesia (N/A)    Anesthesia: Choice    Description of the findings of the procedure:   1. Malodorous vaginal bleeding/discharge  2. Cervix visualized with clot in place, no active bleeding. Sutures from prior surgeries remain visible and intact. Insufficient cervix for Endoloop.  3. Monsel's and packing placed (packing with Monsel's superiorly and Premarin throughout)    Estimated Blood Loss: 5cc         Specimens:   Specimen (12h ago through future)    None        Linn Anderson MD  PGY-4 OB/GYN  [phone]

## 2018-08-08 NOTE — PLAN OF CARE
Problem: Patient Care Overview  Goal: Plan of Care Review  Outcome: Ongoing (interventions implemented as appropriate)  Explained to pt duration of HD and amt of fluid removed

## 2018-08-08 NOTE — ASSESSMENT & PLAN NOTE
- patient poorly controlled  - continue HD per nephrology  - resume all home meds  - appreciate nephrology assistance for BP control

## 2018-08-08 NOTE — SUBJECTIVE & OBJECTIVE
Review of Systems   Constitutional: Positive for fatigue. Negative for activity change, appetite change, chills and fever.   HENT: Negative for trouble swallowing.    Respiratory: Positive for shortness of breath. Negative for apnea, cough, choking and chest tightness.    Cardiovascular: Negative for chest pain, palpitations and leg swelling.   Gastrointestinal: Negative for abdominal distention, abdominal pain, anal bleeding, blood in stool, constipation and diarrhea.   Genitourinary: Positive for vaginal bleeding. Negative for difficulty urinating and dysuria.   Musculoskeletal: Negative for arthralgias and back pain.   Skin: Positive for pallor.   Neurological: Negative for dizziness and headaches.   Psychiatric/Behavioral: Negative for agitation and confusion.       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 ()    Moderate protein-calorie malnutrition 2017    MRSA bacteremia 2017    Pneumonia     Prophylactic immunotherapy 2014    Renovascular hypertension 10/2/2015    Secondary hyperparathyroidism 2017    Seizures     Sialadenitis 3/21/2018    Thrombocytopenia 2016       Past Surgical History:   Procedure Laterality Date     SECTION      x 2    CONIZATION OF CERVIX USING LOOP ELECTROSURGICAL EXCISION PROCEDURE (LEEP) N/A 6/15/2018    Procedure: CONIZATION-CERVICAL-LEEP;  Surgeon: Neelam LINDSEY  MD Lopez;  Location: [facility] OR;  Service: OB/GYN;  Laterality: N/A;    EMBOLIZATION N/A 7/21/2018    Procedure: EMBOLIZATION, BLOOD VESSEL;  Surgeon: Aren Ramos MD;  Location: [facility] CATH LAB;  Service: Radiology;  Laterality: N/A;    EXAMINATION UNDER ANESTHESIA N/A 6/19/2018    Procedure: Exam under anesthesia;  Surgeon: Neelam Marroquin MD;  Location: [facility] OR;  Service: OB/GYN;  Laterality: N/A;    EXAMINATION UNDER ANESTHESIA N/A 7/9/2018    Procedure: Exam under anesthesia (ADD ON );  Surgeon: NICKIE Alvarez MD;  Location: [facility] OR;  Service: OB/GYN;  Laterality: N/A;  (ADD ON )    EXAMINATION UNDER ANESTHESIA N/A 7/26/2018    Procedure: Exam under anesthesia -cervical suturing ;  Surgeon: Lei Sims III, MD;  Location: [facility] OR;  Service: OB/GYN;  Laterality: N/A;    LIVER BIOPSY      LIVER TRANSPLANT  09/1992    PERINEORRHAPHY  6/19/2018    Procedure: SUTURE REPAIR,CERVIX;  Surgeon: Neelam Marroquin MD;  [facility];  Service: OB/GYN;;    TUBAL LIGATION  2010         Review of patient's allergies indicates:   Allergen Reactions    Chloral hydrate      Other reaction(s): Hallucinations  Other reaction(s): Hives    Hydrocodone Other (See Comments)     Mental status changes       Social History Main Topics    Smoking status: Never Smoker    Smokeless tobacco: Never Used    Alcohol use No    Drug use: No    Sexual activity: No       Prescriptions Prior to Admission   Medication Sig Dispense Refill Last Dose    acetaminophen-codeine 300-30mg (TYLENOL #3) 300-30 mg Tab Take 1 tablet by mouth every 6 (six) hours as needed. 10 tablet 0 Past Month    aspirin 81 MG Chew Take 1 tablet (81 mg total) by mouth once daily.  0 8/6/2018    cinacalcet (SENSIPAR) 30 MG Tab Take 1 tablet (30 mg total) by mouth daily with breakfast. (Patient taking differently: Take 30 mg by mouth. On Tuesday, Thursday, and Saturday with dialysis) 30 tablet 11 8/6/2018    citalopram (CELEXA) 20 MG  tablet TAKE 1 TABLET BY MOUTH EVERY DAY 30 tablet 1 8/6/2018    clindamycin (CLINDESSE) 2 % vaginal cream Place 1 full applicator nightly 40 g 3 8/6/2018    cloNIDine 0.3 mg/24 hr td ptwk (CATAPRES) 0.3 mg/24 hr Place 1 patch onto the skin every 7 days. 4 patch 11 8/6/2018    conjugated estrogens (PREMARIN) vaginal cream Regimen: 2g nightly for 1 week then 1g nightly for 1 week, then 0.5g M/W/F nights 30 g 11 8/6/2018    famotidine (PEPCID) 40 MG tablet Take 0.5 tablets (20 mg total) by mouth once daily. 15 tablet 11 8/6/2018    food supplemt, lactose-reduced (ENSURE ACTIVE HIGH PROTEIN) Liqd Take 236 mLs by mouth 2 (two) times daily. 60 Can 6 8/6/2018    labetalol (NORMODYNE) 200 MG tablet Take 2 tablets (400 mg total) by mouth every 8 (eight) hours. (Patient taking differently: Take 400 mg by mouth every 12 (twelve) hours. ) 360 tablet 11 8/6/2018    lisinopril (PRINIVIL,ZESTRIL) 40 MG tablet Take 1 tablet (40 mg total) by mouth once daily. 30 tablet 0 8/6/2018    mycophenolate (CELLCEPT) 250 mg Cap Take 1,000 mg by mouth 2 (two) times daily.   8/6/2018    NIFEdipine (PROCARDIA-XL) 60 MG (OSM) 24 hr tablet Take 2 tablets (120 mg total) by mouth once daily. 60 tablet 11 8/6/2018    ondansetron (ZOFRAN) 4 MG tablet Take 1 tablet (4 mg total) by mouth every 6 (six) hours as needed for Nausea. (Patient taking differently: Take 4 mg by mouth once daily. ) 15 tablet 0 8/6/2018    predniSONE (DELTASONE) 1 MG tablet Take 1 mg by mouth every other day.   8/6/2018    tacrolimus (PROGRAF) 1 MG Cap Take 7 capsules (7 mg total) by mouth every 12 (twelve) hours. 420 capsule 11 8/6/2018    hydrALAZINE (APRESOLINE) 100 MG tablet Take 1 tablet (100 mg total) by mouth every 8 (eight) hours. 90 tablet 0 7/25/2018    triamcinolone acetonide 0.1% (KENALOG) 0.1 % ointment AAA on arms, legs, and neck bid x 1-2 wks then prn flares only (Patient taking differently: Apply to affected area(s) on arms, legs, and neck twice  daily x 1-2 wks then as needed for flares only) 80 g 3 7/26/2018 at Unknown time       Objective:     Vital Signs (Most Recent):  Temp: 96.8 °F (36 °C) (08/08/18 1431)  Pulse: 101 (08/08/18 1431)  Resp: 18 (08/08/18 1431)  BP: (!) 162/93 (08/08/18 1431)  SpO2: 100 % (08/08/18 1431) Vital Signs (24h Range):  Temp:  [96.5 °F (35.8 °C)-97.8 °F (36.6 °C)] 96.8 °F (36 °C)  Pulse:  [] 101  Resp:  [16-21] 18  SpO2:  [98 %-100 %] 100 %  BP: (115-210)/() 162/93     Weight: 52.6 kg (116 lb) (08/07/18 0713)  Body mass index is 19.3 kg/m².    Physical Exam   Constitutional: She is oriented to person, place, and time. She appears well-developed and well-nourished.   HENT:   Head: Normocephalic and atraumatic.   Eyes: EOM are normal.   Neck: Normal range of motion. Neck supple.   Cardiovascular: Normal rate and regular rhythm.    Pulmonary/Chest: Effort normal and breath sounds normal.   Abdominal: Soft. Bowel sounds are normal. She exhibits no distension and no mass. There is no tenderness. There is no guarding.   Musculoskeletal: Normal range of motion.   Neurological: She is alert and oriented to person, place, and time.   Skin: Skin is warm and dry.   Psychiatric: She has a normal mood and affect.       MELD-Na score: 22 at 8/8/2018  4:30 AM  MELD score: 21 at 8/8/2018  4:30 AM  Calculated from:  Serum Creatinine: 0  Serum Sodium: 136 mmol/L at 8/8/2018  4:30 AM  Total Bilirubin: 0.6 mg/dL (Rounded to 1) at 8/8/2018  4:30 AM  INR(ratio): 1.1 at 8/7/2018  8:06 AM  Age: 27 years    Significant Labs:  CBC:   Recent Labs  Lab 08/08/18  0429   WBC 5.67   RBC 2.61*   HGB 7.4*   HCT 22.4*   PLT 82*     BMP:   Recent Labs  Lab 08/08/18  0430         K 4.5      CO2 23   BUN 57*   CREATININE 10.1*   CALCIUM 8.8     CMP:   Recent Labs  Lab 08/08/18 0430      CALCIUM 8.8   ALBUMIN 2.6*   PROT 7.1      K 4.5   CO2 23      BUN 57*   CREATININE 10.1*   ALKPHOS 663*   ALT 30   AST 33    BILITOT 0.6     Coagulation:   Recent Labs  Lab 08/07/18  0806   INR 1.1       Significant Imaging:  Labs: Reviewed

## 2018-08-08 NOTE — PLAN OF CARE
Problem: Patient Care Overview  Goal: Plan of Care Review  Outcome: Ongoing (interventions implemented as appropriate)  Pt is AAOx4. VSS with the exception of hypertension- medications have been given and pt is asymptomatic. No falls/injury as pt calls for assistance when needed. Pt able to ambulate independently. No s/s of skin breakdown as pt is independent with positioning self. Pain being monitored and controlled with PRN meds. Bed in lowest position. Call light within reach. Will continue to monitor.

## 2018-08-08 NOTE — NURSING TRANSFER
Nursing Transfer Note      8/8/2018     Transfer To: [id number]    Transfer via stretcher    Transfer with n/a    Transported by pct    Medicines sent: n/a    Chart send with patient: Yes    Notified: in room

## 2018-08-08 NOTE — NURSING
"Notified Dr Toney of patient B/p reading 200/140. Dr Toney stated, " notify MD If Systolic is over 210 . Diastolic will run high .diastolic reading is ok .patient is not symptomatic with high b/p  " notified Cesar PRUITT.   "

## 2018-08-08 NOTE — OP NOTE
OPERATIVE NOTE    DATE OF PROCEDURE: 2018    SURGEON: Neelam Marroquin M.D.     ASSISTANT: Linn Anderson M.D. (PGY-4)    PREOPERATIVE DIAGNOSIS: Postoperative vaginal bleeding following genitourinary procedure [N99.820]    POSTOPERATIVE DIAGNOSIS: Same.     PROCEDURE: EUA, vaginal packing    ANESTHESIA: systemic    FINDINGS:   1. Malodorous vaginal bleeding/discharge  2. Cervix visualized with clot in place, no active bleeding. Sutures from prior surgeries remain visible and intact. Insufficient cervix for Endoloop.  3. Monsel's and packing placed (packing with Monsel's superiorly and Premarin throughout)    ESTIMATED BLOOD LOSS: 5 mL.     URINE OUTPUT: 25 mL.     IV FLUIDS: 200 mL.     INDICATIONS: Ms. Patel is a 28yo  who is 7.5 weeks s/p LEEP who presents for her     PROCEDURE IN DETAIL: After proper consents were explained and obtained, the patient was taken to the Operating Room where anesthesia was obtained without difficulty. She was then placed in dorsal lithotomy position in candycane stirrups. The patient was then prepped and draped in normal sterile fashion. A weighted speculum was placed into the vagina. Right angle used to visualize the cervix with the above findings. There was no active bleeding requiring suturing or the Endoloop. Furthermore, there was insufficient cervix to attempt an Endoloop. Monsel's was placed with cessation of oozing. Packing was placed with additional Monsel's superiorly on the packing and Premarin coating the packing. The patient tolerated the procedure well. All counts were correct x2. The patient was taken to Recovery area in stable condition.    Linn Anderson MD  PGY-4 OB/GYN  [phone]

## 2018-08-08 NOTE — PROGRESS NOTES
Progress Note  Gynecology    Admit Date: 2018  LOS: 1    Reason for Admission:  Acute blood loss anemia    SUBJECTIVE:     Holly Patel is a 27 y.o.  with PMHx significant for liver transplant on chronic immunosuppression, poorly controlled HTN, ESRD on HD, and known non-compliance who presents in the ED with continued vaginal bleeding. Ms. Patel is well-known to GYN service. Patient had a LEEP on 6/15/18 and has had intermittent issues with bleeding since then. See initial consult note for full postop course details.    Patient reports bleeding has decreased overnight. She uses two pads at a time out of personal preference, can go 4 hrs now before needing to change pads. Denies anemia symptoms, vaginal discharge, fever, chills, nausea, vomiting. NPO in anticipation of procedure today. Was not dialyzed yesterday, per patient and chart review plan is for HD today. No new complaints.    OBJECTIVE:     Vital Signs   Temp:  [96.5 °F (35.8 °C)-98.3 °F (36.8 °C)] 96.5 °F (35.8 °C)  Pulse:  [100-112] 108  Resp:  [17-18] 18  SpO2:  [97 %-100 %] 99 %  BP: (155-228)/() 171/98      Intake/Output Summary (Last 24 hours) at 18 0632  Last data filed at 18 0500   Gross per 24 hour   Intake            637.5 ml   Output                0 ml   Net            637.5 ml       Physical Exam:  Gen: A&Ox3, NAD, resting comfortably  CV: RRR  Pulm: normal respiratory effort  Abd: soft, non-distended, non-tender to palpation without rebound or guarding  Pelvic: deferred  Ext: no peripheral edema, TEDs/SCDs in place    Laboratory:    Recent Labs  Lab 18  0806 18  0429   WBC 4.13 5.67   HGB 6.1* 7.4*   HCT 19.6* 22.4*   MCV 88 86   PLT 72* 82*        Diagnostic Results:  None new for GYN    ASSESSMENT/PLAN:     Active Hospital Problems    Diagnosis  POA    *Acute blood loss anemia [D62]  Yes    Vaginal bleeding [N93.9]  Yes    Thrombocytopenia [D69.6]  Yes    Anemia in ESRD (end-stage renal  disease) [N18.6, D63.1]  Yes     Chronic    Renovascular hypertension [I15.0]  Yes     Chronic    ESRD on hemodialysis [N18.6, Z99.2]  Not Applicable     Chronic    Prophylactic immunotherapy [Z29.8]  Not Applicable   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 [Z94.4]  Not Applicable     Chronic     hemangioendothelioma s/p LTx (1992)        Resolved Hospital Problems    Diagnosis Date Resolved POA   No resolved problems to display.       Persistent postop vaginal bleeding after LEEP 6/15/18  - given pattern of severe HTN and bleeding, strongly feel that persistent bleeding is due to uncontrolled HTN, likely due to non-compliance with medications and HD  - bleeding has slowed overnight with BP control  - plan for EUA today with potential use of endoloop to control cervical bleeding. Consents signed and to chart today. Blood consents already on file. NPO.   Currently planning to do procedure around 12 noon however this depends on addon OR availability as well as HD timing. Appreciate primary team's assistance with coordinating timing of HD to allow us to proceed with procedure as planned.  - recommend flagyl 500 mg BID for vaginal infection on exam  - continue premarin cream 2g nightly  - transfusions and medical management per primary team, appreciate any help with BP control and compliance strategies  - consider SW consult to explore options for long-term care to keep BP under control while cervix is healing to prevent frequent readmissions that many services feel are related to non-compliance at home and with HD schedule     GYN will continue to follow. Please call with questions or acute change in bleeding.     Roberta Ruiz MD  OBGYN - PGY 4

## 2018-08-08 NOTE — PROGRESS NOTES
[facility] Hospital Medicine  Progress Note    Patient Name: Holly Patel  MRN: [id number]  Patient Class: IP- Inpatient   Admission Date: 8/7/2018  Length of Stay: 1 days  Attending Physician: Katie Horvath MD  Primary Care Provider: Stan Sosa MD    Hospital Medicine Team: [facility] HOSP MED G Katie Horvath MD    Subjective:     Principal Problem:Acute blood loss anemia    HPI:  The patient is a 28 y/o female with PMH of ESRD on HD MWF, h/o liver transplant on immunosuppression with prograf and prednisone, persistent vaginal bleeding after LEEP procedure in June 2018, poorly controlle HTN, and diastolic HF, and anemia from blood loss. She had had a recent admit for same about a month ago. She underwent LEEP procedure in June 2018 but despite evaluation by GYN she continues to bleeding. Since her last discharge from [facility] she was admitted under gyn service at the end of July and DC summary per Dr. Sims in GYN as per below:    Patient underwent EUA with placement of sturmdorf sutures, and treatment of vagianl atrophy with premarin cream. She did not require transfusion. She was dialyzed x2 during this admission. Her bleeding stopped on POD#3, and she is stable for discharge with close follow up and precautions.  She often requires multiple transfusions and BP control has always been uncontrolled.     She reported that she continued to bleed since then and just could not continue with the bleeding and that is why she is presenting today. She saturates two pads per hour but denies any sob, cp, dizziness or LOC. She reports compliance with fluids and salt and all of her meds.           Hospital Course:  No notes on file    Interval History  Tolerated HD today. Tolerated exam under anesthesia.  Patient and mother reporting compliance with all anti-hypertensives at home.     Review of Systems   Constitutional: Negative for chills, fatigue and fever.   HENT: Negative for congestion and sneezing.     Eyes: Negative for visual disturbance.   Respiratory: Negative for cough and shortness of breath.    Cardiovascular: Negative for chest pain and palpitations.   Gastrointestinal: Negative for abdominal pain and diarrhea.   Genitourinary: Positive for vaginal bleeding.   Musculoskeletal: Negative for arthralgias.   Skin: Negative for rash.   Neurological: Negative for light-headedness and headaches.     Objective:     Vital Signs (Most Recent):  Temp: 96.8 °F (36 °C) (08/08/18 1431)  Pulse: 101 (08/08/18 1431)  Resp: 18 (08/08/18 1431)  BP: (!) 162/93 (08/08/18 1431)  SpO2: 100 % (08/08/18 1431) Vital Signs (24h Range):  Temp:  [96.5 °F (35.8 °C)-97.8 °F (36.6 °C)] 96.8 °F (36 °C)  Pulse:  [] 101  Resp:  [16-21] 18  SpO2:  [98 %-100 %] 100 %  BP: (115-210)/() 162/93     Weight: 52.6 kg (116 lb)  Body mass index is 19.3 kg/m².    Physical Exam   Constitutional: She is oriented to person, place, and time. She appears well-developed and well-nourished. No distress.   HENT:   Head: Normocephalic and atraumatic.   Eyes: EOM are normal.   Neck: Normal range of motion.   Cardiovascular: Regular rhythm.    Tachy     Pulmonary/Chest: Effort normal and breath sounds normal.   Abdominal: Soft. Bowel sounds are normal. There is no tenderness.   Musculoskeletal: She exhibits no edema.   Neurological: She is alert and oriented to person, place, and time.   Skin: No erythema.   Vitals reviewed.        CRANIAL NERVES     CN III, IV, VI   Extraocular motions are normal.        Significant Labs: All pertinent labs within the past 24 hours have been reviewed.    Significant Imaging: I have reviewed all pertinent imaging results/findings within the past 24 hours.    Assessment/Plan:      * Acute blood loss anemia    Vaginal Bleeding  - has been an ongoing problem due to vaginal bleeding after LEEP procedure  - gyn consulted - performed EUA 8/8 with below findings  1. Malodorous vaginal bleeding/discharge  2. Cervix  visualized with clot in place, no active bleeding. Sutures from prior surgeries remain visible and intact. Insufficient cervix for Endoloop.  3. Monsel's and packing placed (packing with Monsel's superiorly and Premarin throughout)    - transfuse to keep Hb > 7  - BP control          Vaginal infection    -gyn recommends flagyl          Vaginal bleeding    - transfuse to keep Hb > 7  - appreciate gyn consult           Thrombocytopenia    - platelets > 50K   - no need for transfusion   - continue to monitor           Anemia in ESRD (end-stage renal disease)    - see acute blood loss anemia           Renovascular hypertension    - patient poorly controlled  - continue HD per nephrology  - resume all home meds  - appreciate nephrology assistance for BP control           ESRD on hemodialysis    - on HD MWF  - nephrology following for HD  - continue cinacalcet  - continue renal diet           Prophylactic immunotherapy    - per liver transplant           Liver replaced by transplant    - hepatology consulted for immunosuppression management  - continue home dose prograf and prednisone  - hold cellcept  - daily prograf levels             VTE Risk Mitigation         Ordered     IP VTE HIGH RISK PATIENT  Once      08/07/18 1528     Place LINDA hose  Until discontinued      08/07/18 1528     Place sequential compression device  Until discontinued      08/07/18 1528     Reason for No Pharmacological VTE Prophylaxis  Once      08/07/18 1528              Katie Horvath MD  Department of Hospital Medicine   [facility]

## 2018-08-08 NOTE — ASSESSMENT & PLAN NOTE
Vaginal Bleeding  - has been an ongoing problem due to vaginal bleeding after LEEP procedure  - gyn consulted - performed EUA 8/8 with below findings  1. Malodorous vaginal bleeding/discharge  2. Cervix visualized with clot in place, no active bleeding. Sutures from prior surgeries remain visible and intact. Insufficient cervix for Endoloop.  3. Monsel's and packing placed (packing with Monsel's superiorly and Premarin throughout)    - transfuse to keep Hb > 7  - BP control

## 2018-08-08 NOTE — NURSING
" Vanessa PRUITT   checking B/P.  B/P 210/110 at this time . Dr Love notified of orders to notify MD of elevated B/P of systolic 210 . B/P : 210/110. Dr Love stated " ok to give 10 pm B/p medication: hydralazine 100 mg  And Labetalol 400 mg now. Vanessa PRUITT notified.   "

## 2018-08-08 NOTE — NURSING TRANSFER
Nursing Transfer Note      8/8/2018     Transfer to dialysis    Transfer via stretcher    Transported by escort    Medicines sent: none    Chart send with patient:yes    Notified: patient's mother    Patient reassessed at: upon transfer

## 2018-08-08 NOTE — TRANSFER OF CARE
"Anesthesia Transfer of Care Note    Patient: Holly Patel    Procedure(s) Performed: Procedure(s) (LRB):  Exam Under Anesthesia (N/A)    Patient location: PACU    Anesthesia Type: general    Transport from OR: Transported from OR on 6-10 L/min O2 by face mask with adequate spontaneous ventilation    Post pain: adequate analgesia    Post assessment: no apparent anesthetic complications    Post vital signs: stable    Level of consciousness: awake    Nausea/Vomiting: no nausea/vomiting    Complications: none    Transfer of care protocol was followed      Last vitals:   Visit Vitals  BP (!) 162/104 (BP Location: Right arm, Patient Position: Lying)   Pulse 95   Temp 36.4 °C (97.6 °F) (Axillary)   Resp 18   Ht 5' 5" (1.651 m)   Wt 52.6 kg (116 lb)   LMP  (LMP Unknown)   SpO2 100%   Breastfeeding? No   BMI 19.30 kg/m²     "

## 2018-08-08 NOTE — ASSESSMENT & PLAN NOTE
- hepatology consulted for immunosuppression management  - continue home dose prograf and prednisone  - hold cellcept  - daily prograf levels

## 2018-08-08 NOTE — PROGRESS NOTES
HD completed time was decreased by 45mins due to surgery time. In 2.44 hrs 2l was removed pt tolerated well, blood returned and 15g buttonhole needles removed,pressure applied to LLA fistula until hemostasis achieved, gauze and tape applied. LLA fistula bruit/ thrill. Report called to OR nurse, pt transported to surgery by OR staff

## 2018-08-08 NOTE — PROGRESS NOTES
Dialysis nurse called to get report on pt. Stated pt will have dialysis before surgery. Pt's /118. Per dialysis nurse, give AM blood pressure medication before sending pt.

## 2018-08-08 NOTE — SUBJECTIVE & OBJECTIVE
Interval History  Tolerated HD today. Tolerated exam under anesthesia.  Patient and mother reporting compliance with all anti-hypertensives at home.     Review of Systems   Constitutional: Negative for chills, fatigue and fever.   HENT: Negative for congestion and sneezing.    Eyes: Negative for visual disturbance.   Respiratory: Negative for cough and shortness of breath.    Cardiovascular: Negative for chest pain and palpitations.   Gastrointestinal: Negative for abdominal pain and diarrhea.   Genitourinary: Positive for vaginal bleeding.   Musculoskeletal: Negative for arthralgias.   Skin: Negative for rash.   Neurological: Negative for light-headedness and headaches.     Objective:     Vital Signs (Most Recent):  Temp: 96.8 °F (36 °C) (08/08/18 1431)  Pulse: 101 (08/08/18 1431)  Resp: 18 (08/08/18 1431)  BP: (!) 162/93 (08/08/18 1431)  SpO2: 100 % (08/08/18 1431) Vital Signs (24h Range):  Temp:  [96.5 °F (35.8 °C)-97.8 °F (36.6 °C)] 96.8 °F (36 °C)  Pulse:  [] 101  Resp:  [16-21] 18  SpO2:  [98 %-100 %] 100 %  BP: (115-210)/() 162/93     Weight: 52.6 kg (116 lb)  Body mass index is 19.3 kg/m².    Physical Exam   Constitutional: She is oriented to person, place, and time. She appears well-developed and well-nourished. No distress.   HENT:   Head: Normocephalic and atraumatic.   Eyes: EOM are normal.   Neck: Normal range of motion.   Cardiovascular: Regular rhythm.    Tachy     Pulmonary/Chest: Effort normal and breath sounds normal.   Abdominal: Soft. Bowel sounds are normal. There is no tenderness.   Musculoskeletal: She exhibits no edema.   Neurological: She is alert and oriented to person, place, and time.   Skin: No erythema.   Vitals reviewed.        CRANIAL NERVES     CN III, IV, VI   Extraocular motions are normal.        Significant Labs: All pertinent labs within the past 24 hours have been reviewed.    Significant Imaging: I have reviewed all pertinent imaging results/findings within the  past 24 hours.

## 2018-08-08 NOTE — PROGRESS NOTES
Received report from nurse, pt arrived via stretcher ambulated on own to bed. AAOX4, denies pain or discomfort, BP elevated per nurse all AM meds were administered. LLA fistula +bruit/ thrill accesseed w/ 15g buttonhole needles w/o difficulty,lines secure and HD began, goal to remove 3 instead of 3L per Md over 3.5hrs of HD

## 2018-08-08 NOTE — PLAN OF CARE
Problem: Patient Care Overview  Goal: Plan of Care Review  Outcome: Ongoing (interventions implemented as appropriate)  No acute distress noted.  No complaints of pain voiced at this time.  Q2H rounding in progress.  Call light in reach.  Fall precautions in place.  Vitals stable at this time.  Pt remains NPO since midnight for surgery in AM

## 2018-08-08 NOTE — PLAN OF CARE
Problem: Patient Care Overview  Goal: Plan of Care Review  Outcome: Ongoing (interventions implemented as appropriate)  Pt arrived to room  via stetcher  Pt rates pain as tolerable . Pt denies nausea. Pt denies numbness and tinglng. . Pt dressing clean , dry and intact . Pt oriented to room  And call system . Will continue to monitor .

## 2018-08-08 NOTE — CONSULTS
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 Hepatology is being consulted for management of immunosuppression.    Patient does not recall her regimen, but she states that she takes her medications as stated on the list.  Patient current regimen Cellcept 1000/1000, prograf 7/7, and prednisone 1mg BID. Patient had inconsistencies in her medical regimen in the past, and it is not clear if she should be on Cellcept. The patient has history of non-compliance with medications in the past. She has been on prograf 1/1 a few months ago.    Review of Systems   Constitutional: Positive for fatigue. Negative for activity change, appetite change, chills and fever.   HENT: Negative for trouble swallowing.    Respiratory: Positive for shortness of breath. Negative for apnea, cough, choking and chest tightness.    Cardiovascular: Negative for chest pain, palpitations and leg swelling.   Gastrointestinal: Negative for abdominal distention, abdominal pain, anal bleeding, blood in stool, constipation and diarrhea.   Genitourinary: Positive for vaginal bleeding. Negative for difficulty urinating and dysuria.   Musculoskeletal: Negative for arthralgias and back pain.   Skin: Positive for pallor.   Neurological: Negative for dizziness and headaches.   Psychiatric/Behavioral:  Negative for agitation and confusion.       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 ()    Moderate protein-calorie malnutrition 2017    MRSA bacteremia 2017    Pneumonia     Prophylactic immunotherapy 2014    Renovascular hypertension 10/2/2015    Secondary hyperparathyroidism 2017    Seizures     Sialadenitis 3/21/2018    Thrombocytopenia 2016       Past Surgical History:   Procedure Laterality Date     SECTION      x 2    CONIZATION OF CERVIX USING LOOP ELECTROSURGICAL EXCISION PROCEDURE (LEEP) N/A 6/15/2018    Procedure: CONIZATION-CERVICAL-LEEP;  Surgeon: Neelam Marroquin MD;  Location: [facility] OR;  Service: OB/GYN;  Laterality: N/A;    EMBOLIZATION N/A 2018    Procedure: EMBOLIZATION, BLOOD VESSEL;  Surgeon: Aren Ramos MD;  Location: [facility] CATH LAB;  Service: Radiology;  Laterality: N/A;    EXAMINATION UNDER ANESTHESIA N/A 2018    Procedure: Exam under anesthesia;  Surgeon: Neelam Marroquin MD;  Location: [facility] OR;  Service: OB/GYN;  Laterality: N/A;    EXAMINATION UNDER ANESTHESIA N/A 2018    Procedure: Exam under anesthesia (ADD ON );  Surgeon: NICKIE Alvarez MD;  Location: [facility] OR;  Service: OB/GYN;  Laterality: N/A;  (ADD ON )    EXAMINATION UNDER ANESTHESIA N/A 2018    Procedure: Exam under anesthesia -cervical suturing ;  Surgeon: Lei PAYTON  Levi MARINO MD;  Location: [facility];  Service: OB/GYN;  Laterality: N/A;    LIVER BIOPSY      LIVER TRANSPLANT  09/1992    PERINEORRHAPHY  6/19/2018    Procedure: SUTURE REPAIR,CERVIX;  Surgeon: Neelam Marroquin MD;  Location: [facility];  Service: OB/GYN;;    TUBAL LIGATION  2010         Review of patient's allergies indicates:   Allergen Reactions    Chloral hydrate      Other reaction(s): Hallucinations  Other reaction(s): Hives    Hydrocodone Other (See Comments)     Mental status changes       Social History Main Topics    Smoking status: Never Smoker    Smokeless tobacco: Never Used    Alcohol use No    Drug use: No    Sexual activity: No       Prescriptions Prior to Admission   Medication Sig Dispense Refill Last Dose    acetaminophen-codeine 300-30mg (TYLENOL #3) 300-30 mg Tab Take 1 tablet by mouth every 6 (six) hours as needed. 10 tablet 0 Past Month    aspirin 81 MG Chew Take 1 tablet (81 mg total) by mouth once daily.  0 8/6/2018    cinacalcet (SENSIPAR) 30 MG Tab Take 1 tablet (30 mg total) by mouth daily with breakfast. (Patient taking differently: Take 30 mg by mouth. On Tuesday, Thursday, and Saturday with dialysis) 30 tablet 11 8/6/2018    citalopram (CELEXA) 20 MG tablet TAKE 1 TABLET BY MOUTH EVERY DAY 30 tablet 1 8/6/2018    clindamycin (CLINDESSE) 2 % vaginal cream Place 1 full applicator nightly 40 g 3 8/6/2018    cloNIDine 0.3 mg/24 hr td ptwk (CATAPRES) 0.3 mg/24 hr Place 1 patch onto the skin every 7 days. 4 patch 11 8/6/2018    conjugated estrogens (PREMARIN) vaginal cream Regimen: 2g nightly for 1 week then 1g nightly for 1 week, then 0.5g M/W/F nights 30 g 11 8/6/2018    famotidine (PEPCID) 40 MG tablet Take 0.5 tablets (20 mg total) by mouth once daily. 15 tablet 11 8/6/2018    food supplemt, lactose-reduced (ENSURE ACTIVE HIGH PROTEIN) Liqd Take 236 mLs by mouth 2 (two) times daily. 60 Can 6 8/6/2018    labetalol (NORMODYNE) 200 MG tablet Take 2 tablets (400 mg  total) by mouth every 8 (eight) hours. (Patient taking differently: Take 400 mg by mouth every 12 (twelve) hours. ) 360 tablet 11 8/6/2018    lisinopril (PRINIVIL,ZESTRIL) 40 MG tablet Take 1 tablet (40 mg total) by mouth once daily. 30 tablet 0 8/6/2018    mycophenolate (CELLCEPT) 250 mg Cap Take 1,000 mg by mouth 2 (two) times daily.   8/6/2018    NIFEdipine (PROCARDIA-XL) 60 MG (OSM) 24 hr tablet Take 2 tablets (120 mg total) by mouth once daily. 60 tablet 11 8/6/2018    ondansetron (ZOFRAN) 4 MG tablet Take 1 tablet (4 mg total) by mouth every 6 (six) hours as needed for Nausea. (Patient taking differently: Take 4 mg by mouth once daily. ) 15 tablet 0 8/6/2018    predniSONE (DELTASONE) 1 MG tablet Take 1 mg by mouth every other day.   8/6/2018    tacrolimus (PROGRAF) 1 MG Cap Take 7 capsules (7 mg total) by mouth every 12 (twelve) hours. 420 capsule 11 8/6/2018    hydrALAZINE (APRESOLINE) 100 MG tablet Take 1 tablet (100 mg total) by mouth every 8 (eight) hours. 90 tablet 0 7/25/2018    triamcinolone acetonide 0.1% (KENALOG) 0.1 % ointment AAA on arms, legs, and neck bid x 1-2 wks then prn flares only (Patient taking differently: Apply to affected area(s) on arms, legs, and neck twice daily x 1-2 wks then as needed for flares only) 80 g 3 7/26/2018 at Unknown time       Objective:     Vital Signs (Most Recent):  Temp: 96.8 °F (36 °C) (08/08/18 1431)  Pulse: 101 (08/08/18 1431)  Resp: 18 (08/08/18 1431)  BP: (!) 162/93 (08/08/18 1431)  SpO2: 100 % (08/08/18 1431) Vital Signs (24h Range):  Temp:  [96.5 °F (35.8 °C)-97.8 °F (36.6 °C)] 96.8 °F (36 °C)  Pulse:  [] 101  Resp:  [16-21] 18  SpO2:  [98 %-100 %] 100 %  BP: (115-210)/() 162/93     Weight: 52.6 kg (116 lb) (08/07/18 0713)  Body mass index is 19.3 kg/m².    Physical Exam   Constitutional: She is oriented to person, place, and time. She appears well-developed and well-nourished.   HENT:   Head: Normocephalic and atraumatic.   Eyes: EOM are  normal.   Neck: Normal range of motion. Neck supple.   Cardiovascular: Normal rate and regular rhythm.    Pulmonary/Chest: Effort normal and breath sounds normal.   Abdominal: Soft. Bowel sounds are normal. She exhibits no distension and no mass. There is no tenderness. There is no guarding.   Musculoskeletal: Normal range of motion.   Neurological: She is alert and oriented to person, place, and time.   Skin: Skin is warm and dry.   Psychiatric: She has a normal mood and affect.       MELD-Na score: 22 at 8/8/2018  4:30 AM  MELD score: 21 at 8/8/2018  4:30 AM  Calculated from:  Serum Creatinine: 0  Serum Sodium: 136 mmol/L at 8/8/2018  4:30 AM  Total Bilirubin: 0.6 mg/dL (Rounded to 1) at 8/8/2018  4:30 AM  INR(ratio): 1.1 at 8/7/2018  8:06 AM  Age: 27 years    Significant Labs:  CBC:   Recent Labs  Lab 08/08/18  0429   WBC 5.67   RBC 2.61*   HGB 7.4*   HCT 22.4*   PLT 82*     BMP:   Recent Labs  Lab 08/08/18  0430         K 4.5      CO2 23   BUN 57*   CREATININE 10.1*   CALCIUM 8.8     CMP:   Recent Labs  Lab 08/08/18  0430      CALCIUM 8.8   ALBUMIN 2.6*   PROT 7.1      K 4.5   CO2 23      BUN 57*   CREATININE 10.1*   ALKPHOS 663*   ALT 30   AST 33   BILITOT 0.6     Coagulation:   Recent Labs  Lab 08/07/18  0806   INR 1.1       Significant Imaging:  Labs: Reviewed    Assessment/Plan:     Liver replaced by transplant    27 year old female with a history of ESRD on HD and s/p LTx in 1992 for a hemangioendotheliamo with history of chronic rejection currently admitted with acute blood loss anemia secondary to vaginal bleeding. Hepatology is being consulted for management of immunosuppression.     Unclear if the patient is taking cellcept at this time. Would hold now and we will verify dosing and whether she should be on it. Her Tacrolimus level is 3.2, likely due to non-compliance. Will continue tacrolimus at current dose of 7/7 and continue prednisone  EOD     Recommendations:  --Daily CMP, CBC, and INR  --Daily Tacrolimus  --Continue tacrolimus 7/7 current dose  --hold Cellcept for now  --If the patient is actively bleeding will recommend DDAVP as she likely has platelet dysfunction on top of chronic thrombocytopenia. Will consider splenic embolization for thrombocytoopenia in attempt to decrease her chronic risk of bleeding.            Thank you for your consult. I will follow-up with patient. Please contact us if you have any additional questions.    Jenniffer Servin MD  Hepatology  [facility]

## 2018-08-08 NOTE — PROGRESS NOTES
Received report from nurse. Pt arrived via stretcher. Left arm IV noted with heparin infusing @ 23cc/kg /min, due to be turned off @ 10am. Pt AAOX4, O2@2LNC sats 98/99%. Right  Arm graft noted, +bruit/thrill. Grafy accessed w/ 15g needles w/o difficulty, lines secured and visible. Goal fluid removal is 3L BP permitting

## 2018-08-08 NOTE — PROGRESS NOTES
Pt blood pressure remains high manually.  MD aware.  Denies headache/dizziness.  Reported using only 2 pads since 7pm.  No complaints voiced at this time.  Family at bedside.  Call light in reach

## 2018-08-08 NOTE — ANESTHESIA POSTPROCEDURE EVALUATION
"Anesthesia Post Evaluation    Patient: Holly Patel    Procedure(s) Performed: Procedure(s) (LRB):  Exam Under Anesthesia (N/A)    Final Anesthesia Type: general  Patient location during evaluation: PACU  Patient participation: Yes- Able to Participate  Level of consciousness: awake and alert and oriented  Post-procedure vital signs: reviewed and stable  Pain management: adequate  Airway patency: patent  PONV status at discharge: No PONV  Anesthetic complications: no      Cardiovascular status: blood pressure returned to baseline and hemodynamically stable  Respiratory status: unassisted  Hydration status: euvolemic  Follow-up not needed.        Visit Vitals  BP (!) 155/103 (BP Location: Right arm, Patient Position: Lying)   Pulse 80   Temp 36.4 °C (97.6 °F) (Axillary)   Resp 16   Ht 5' 5" (1.651 m)   Wt 52.6 kg (116 lb)   LMP  (LMP Unknown)   SpO2 99%   Breastfeeding? No   BMI 19.30 kg/m²       Pain/Bladimir Score: Pain Assessment Performed: Yes (8/8/2018  1:02 PM)  Presence of Pain: complains of pain/discomfort (8/8/2018  1:02 PM)  Pain Rating Prior to Med Admin: 5 (8/8/2018  1:33 PM)  Pain Rating Post Med Admin: 8 (8/7/2018  5:36 PM)  Bladimir Score: 10 (8/8/2018  1:02 PM)      "

## 2018-08-09 LAB
ALBUMIN SERPL BCP-MCNC: 2.4 G/DL
ALP SERPL-CCNC: 590 U/L
ALT SERPL W/O P-5'-P-CCNC: 23 U/L
ANION GAP SERPL CALC-SCNC: 9 MMOL/L
AST SERPL-CCNC: 26 U/L
BASOPHILS # BLD AUTO: 0.02 K/UL
BASOPHILS NFR BLD: 0.3 %
BILIRUB SERPL-MCNC: 0.7 MG/DL
BUN SERPL-MCNC: 33 MG/DL
CALCIUM SERPL-MCNC: 8.6 MG/DL
CHLORIDE SERPL-SCNC: 102 MMOL/L
CO2 SERPL-SCNC: 26 MMOL/L
CREAT SERPL-MCNC: 7.1 MG/DL
DIFFERENTIAL METHOD: ABNORMAL
EOSINOPHIL # BLD AUTO: 0.4 K/UL
EOSINOPHIL NFR BLD: 7.6 %
ERYTHROCYTE [DISTWIDTH] IN BLOOD BY AUTOMATED COUNT: 15.7 %
EST. GFR  (AFRICAN AMERICAN): 8.3 ML/MIN/1.73 M^2
EST. GFR  (NON AFRICAN AMERICAN): 7.2 ML/MIN/1.73 M^2
GLUCOSE SERPL-MCNC: 92 MG/DL
HCT VFR BLD AUTO: 24 %
HGB BLD-MCNC: 8 G/DL
IMM GRANULOCYTES # BLD AUTO: 0.02 K/UL
IMM GRANULOCYTES NFR BLD AUTO: 0.3 %
LYMPHOCYTES # BLD AUTO: 0.7 K/UL
LYMPHOCYTES NFR BLD: 12.5 %
MAGNESIUM SERPL-MCNC: 2.2 MG/DL
MCH RBC QN AUTO: 28.9 PG
MCHC RBC AUTO-ENTMCNC: 33.3 G/DL
MCV RBC AUTO: 87 FL
MONOCYTES # BLD AUTO: 0.3 K/UL
MONOCYTES NFR BLD: 5.3 %
NEUTROPHILS # BLD AUTO: 4.3 K/UL
NEUTROPHILS NFR BLD: 74 %
NRBC BLD-RTO: 0 /100 WBC
PHOSPHATE SERPL-MCNC: 4.7 MG/DL
PLATELET # BLD AUTO: 85 K/UL
PMV BLD AUTO: 9.9 FL
POTASSIUM SERPL-SCNC: 4.6 MMOL/L
PROT SERPL-MCNC: 6.6 G/DL
RBC # BLD AUTO: 2.77 M/UL
SODIUM SERPL-SCNC: 137 MMOL/L
TACROLIMUS BLD-MCNC: 3.3 NG/ML
WBC # BLD AUTO: 5.82 K/UL

## 2018-08-09 PROCEDURE — 83735 ASSAY OF MAGNESIUM: CPT

## 2018-08-09 PROCEDURE — 80053 COMPREHEN METABOLIC PANEL: CPT

## 2018-08-09 PROCEDURE — 25000003 PHARM REV CODE 250: Performed by: HOSPITALIST

## 2018-08-09 PROCEDURE — 80197 ASSAY OF TACROLIMUS: CPT

## 2018-08-09 PROCEDURE — 11000001 HC ACUTE MED/SURG PRIVATE ROOM

## 2018-08-09 PROCEDURE — 25000003 PHARM REV CODE 250: Performed by: STUDENT IN AN ORGANIZED HEALTH CARE EDUCATION/TRAINING PROGRAM

## 2018-08-09 PROCEDURE — 36415 COLL VENOUS BLD VENIPUNCTURE: CPT

## 2018-08-09 PROCEDURE — 85025 COMPLETE CBC W/AUTO DIFF WBC: CPT

## 2018-08-09 PROCEDURE — 94761 N-INVAS EAR/PLS OXIMETRY MLT: CPT

## 2018-08-09 PROCEDURE — 63600175 PHARM REV CODE 636 W HCPCS: Performed by: STUDENT IN AN ORGANIZED HEALTH CARE EDUCATION/TRAINING PROGRAM

## 2018-08-09 PROCEDURE — 99232 SBSQ HOSP IP/OBS MODERATE 35: CPT | Mod: ,,, | Performed by: HOSPITALIST

## 2018-08-09 PROCEDURE — 82088 ASSAY OF ALDOSTERONE: CPT

## 2018-08-09 PROCEDURE — 84100 ASSAY OF PHOSPHORUS: CPT

## 2018-08-09 PROCEDURE — 99233 SBSQ HOSP IP/OBS HIGH 50: CPT | Mod: GC,,, | Performed by: INTERNAL MEDICINE

## 2018-08-09 PROCEDURE — 63600175 PHARM REV CODE 636 W HCPCS: Performed by: HOSPITALIST

## 2018-08-09 RX ADMIN — TACROLIMUS 8 MG: 5 CAPSULE ORAL at 05:08

## 2018-08-09 RX ADMIN — LABETALOL HCL 400 MG: 200 TABLET, FILM COATED ORAL at 06:08

## 2018-08-09 RX ADMIN — CINACALCET HYDROCHLORIDE 30 MG: 30 TABLET, COATED ORAL at 08:08

## 2018-08-09 RX ADMIN — OXYCODONE HYDROCHLORIDE 10 MG: 5 TABLET ORAL at 09:08

## 2018-08-09 RX ADMIN — METRONIDAZOLE 500 MG: 500 TABLET ORAL at 09:08

## 2018-08-09 RX ADMIN — LABETALOL HCL 400 MG: 200 TABLET, FILM COATED ORAL at 02:08

## 2018-08-09 RX ADMIN — LISINOPRIL 40 MG: 20 TABLET ORAL at 08:08

## 2018-08-09 RX ADMIN — HYDRALAZINE HYDROCHLORIDE 100 MG: 50 TABLET ORAL at 08:08

## 2018-08-09 RX ADMIN — OXYCODONE HYDROCHLORIDE 10 MG: 5 TABLET ORAL at 12:08

## 2018-08-09 RX ADMIN — TACROLIMUS 7 MG: 5 CAPSULE ORAL at 08:08

## 2018-08-09 RX ADMIN — CONJUGATED ESTROGENS 2 G: 0.62 CREAM VAGINAL at 09:08

## 2018-08-09 RX ADMIN — ONDANSETRON 4 MG: 4 TABLET, FILM COATED ORAL at 08:08

## 2018-08-09 RX ADMIN — HYDRALAZINE HYDROCHLORIDE 100 MG: 50 TABLET ORAL at 04:08

## 2018-08-09 RX ADMIN — OXYCODONE HYDROCHLORIDE 10 MG: 5 TABLET ORAL at 05:08

## 2018-08-09 RX ADMIN — HYDRALAZINE HYDROCHLORIDE 100 MG: 50 TABLET ORAL at 12:08

## 2018-08-09 RX ADMIN — NIFEDIPINE 120 MG: 30 TABLET, FILM COATED, EXTENDED RELEASE ORAL at 08:08

## 2018-08-09 RX ADMIN — FERRIC SUBSULFATE 8 ML: 259 SOLUTION TOPICAL at 12:08

## 2018-08-09 RX ADMIN — METRONIDAZOLE 500 MG: 500 TABLET ORAL at 08:08

## 2018-08-09 RX ADMIN — FAMOTIDINE 20 MG: 20 TABLET ORAL at 08:08

## 2018-08-09 RX ADMIN — LABETALOL HCL 400 MG: 200 TABLET, FILM COATED ORAL at 09:08

## 2018-08-09 RX ADMIN — CITALOPRAM HYDROBROMIDE 20 MG: 20 TABLET ORAL at 08:08

## 2018-08-09 NOTE — SUBJECTIVE & OBJECTIVE
Interval History:   Patient underwent EAU with packing. No bleeding noted today. Patient states that she feels well. No acute complaints.       Current Facility-Administered Medications   Medication    0.9%  NaCl infusion (for blood administration)    acetaminophen tablet 650 mg    cinacalcet tablet 30 mg    citalopram tablet 20 mg    cloNIDine 0.3 mg/24 hr td ptwk 1 patch    conjugated estrogens vaginal cream 2 g    dextrose 50% injection 12.5 g    dextrose 50% injection 25 g    famotidine tablet 20 mg    ferric subsulfate solution    glucagon (human recombinant) injection 1 mg    glucose chewable tablet 16 g    glucose chewable tablet 24 g    hydrALAZINE tablet 100 mg    labetalol tablet 400 mg    lisinopril tablet 40 mg    metroNIDAZOLE tablet 500 mg    NIFEdipine 24 hr tablet 120 mg    ondansetron tablet 4 mg    oxyCODONE immediate release tablet 10 mg    predniSONE tablet 1 mg    sodium chloride 0.9% flush 5 mL    tacrolimus capsule 8 mg       Objective:     Vital Signs (Most Recent):  Temp: 96 °F (35.6 °C) (08/09/18 0732)  Pulse: 88 (08/09/18 1128)  Resp: 18 (08/09/18 1128)  BP: 116/71 (08/09/18 0732)  SpO2: 100 % (08/09/18 1128) Vital Signs (24h Range):  Temp:  [96 °F (35.6 °C)-98.4 °F (36.9 °C)] 96 °F (35.6 °C)  Pulse:  [] 88  Resp:  [16-20] 18  SpO2:  [96 %-100 %] 100 %  BP: (116-177)/() 116/71     Weight: 52.6 kg (116 lb) (08/08/18 2011)  Body mass index is 19.3 kg/m².    Physical Exam   Constitutional: She is oriented to person, place, and time. She appears well-developed and well-nourished. No distress.   HENT:   Head: Normocephalic and atraumatic.   Eyes: EOM are normal. Pupils are equal, round, and reactive to light.   Neck: Normal range of motion. Neck supple.   Cardiovascular: Normal rate and regular rhythm.    Pulmonary/Chest: Effort normal and breath sounds normal.   Abdominal: Soft. Bowel sounds are normal. She exhibits no distension. There is no tenderness.  There is no guarding.   Musculoskeletal: Normal range of motion.   Neurological: She is alert and oriented to person, place, and time.   Skin: Skin is warm and dry.   Psychiatric: She has a normal mood and affect.       MELD-Na score: 21 at 8/9/2018  4:40 AM  MELD score: 21 at 8/9/2018  4:40 AM  Calculated from:  Serum Creatinine: 0  Serum Sodium: 137 mmol/L at 8/9/2018  4:40 AM  Total Bilirubin: 0.7 mg/dL (Rounded to 1) at 8/9/2018  4:40 AM  INR(ratio): 1.1 at 8/7/2018  8:06 AM  Age: 27 years    Significant Labs:  CBC:   Recent Labs  Lab 08/09/18  0440   WBC 5.82   RBC 2.77*   HGB 8.0*   HCT 24.0*   PLT 85*     BMP:   Recent Labs  Lab 08/09/18  0440   GLU 92      K 4.6      CO2 26   BUN 33*   CREATININE 7.1*   CALCIUM 8.6*     CMP:   Recent Labs  Lab 08/09/18  0440   GLU 92   CALCIUM 8.6*   ALBUMIN 2.4*   PROT 6.6      K 4.6   CO2 26      BUN 33*   CREATININE 7.1*   ALKPHOS 590*   ALT 23   AST 26   BILITOT 0.7     Coagulation:   Recent Labs  Lab 08/07/18  0806   INR 1.1       Significant Imaging:  Labs: Reviewed

## 2018-08-09 NOTE — PLAN OF CARE
CM met with patient for discharge planning.  Patient currently receives dialysis at [facility] on [address] on [address].  Plan is to discharge home continuing dialysis.    Pharmacy:    [id number] - [place] - [address]  [address]  [place] [address]  Phone: [phone] Fax: [phone]    [facility]  [address]  [place] [address]  Phone: [phone] Fax: [phone]    PCP:  Stan Sosa MD    Payor: MEDICARE / Plan: MEDICARE PART A & B / Product Type: [facility] /      08/09/18 1126   Discharge Assessment   Assessment Type Discharge Planning Assessment   Confirmed/corrected address and phone number on facesheet? Yes   Assessment information obtained from? Patient   Expected Length of Stay (days) 3   Communicated expected length of stay with patient/caregiver yes   Prior to hospitilization cognitive status: Alert/Oriented   Prior to hospitalization functional status: Independent   Current cognitive status: Alert/Oriented   Current Functional Status: Independent   Facility Arrived From: Emergency room   Lives With parent(s);child(ester), dependent   Able to Return to Prior Arrangements yes   Is patient able to care for self after discharge? Yes   Who are your caregiver(s) and their phone number(s)? Myrna Randolph - Mother [phone]   Patient's perception of discharge disposition home or selfcare   Readmission Within The Last 30 Days previous discharge plan unsuccessful   If yes, most recent facility name: [facility]   Patient currently being followed by outpatient case management? No   Patient currently receives home health services? No   Patient currently receives any other outside agency services? No   Is it the patient/care giver preference to resume care with the current outside agency? No   Equipment Currently Used at Home none   Do you have any problems affording any of your prescribed medications? No   Is the patient taking medications as prescribed? yes   Does the  patient have transportation home? Yes   Transportation Available family or friend will provide   Dialysis Name and Scheduled days [facility] [address]   Does the patient receive services at the [facility]? No   Discharge Plan A Home with family   Discharge Plan B Home Health   Patient/Family In Agreement With Plan yes   Readmission Questionnaire   At the time of your discharge, did someone talk to you about what your health problems were? Yes   At the time of discharge, did someone talk to you about what to watch out for regarding worsening of your health problem? Yes   At the time of discharge, did someone talk to you about what to do if you experienced worsening of your health problem? Yes   At the time of discharge, did someone talk to you about which medication to take when you left the hospital and which ones to stop taking? Yes   At the time of discharge, did someone talk to you about when and where to follow up with a doctor after you left the hospital? Yes   What do you believe caused you to be sick enough to be re-admitted? Bleeding   How often do you need to have someone help you when you read instructions, pamphlets, or other written material from your doctor or pharmacy? Never   Do you have problems taking your medications as prescribed? Yes   Do you have any problems affording any of  your prescribed medications? No   Do you have problems obtaining/receiving your medications? No   Does the patient have transportation to healthcare appointments? Yes   Living Arrangements house   Does the patient have family/friends to help with healtcare needs after discharge? yes   Does your caregiver provide all the help you need? Yes   Are you currently feeling confused? No   Are you currently having problems thinking? No   Are you currently having memory problems? No   Have you felt down, depressed, or hopeless? 0   Have you felt little interest or pleasure in doing things? 0

## 2018-08-09 NOTE — ASSESSMENT & PLAN NOTE
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 Hepatology is being consulted for management of immunosuppression.     Unclear if the patient is taking cellcept at this time. Would hold now given that it might be contributing to thrombocytopenia and increase her tacrolimus dose. Her Tacrolimus level is 3.3. Will increased tacrolimus to 8/8 and continue prednisone EOD     Recommendations:  --Daily CMP, CBC, and INR  --Daily Tacrolimus  --Increase tacrolimus to 8/8  --continue to hold Cellcept for now as it is potentially contributing to her thrombocytopenia  --If the patient is actively bleeding recommend DDAVP as she likely has platelet dysfunction on top of chronic thrombocytopenia. Will consider splenic embolization for thrombocytoopenia in attempt to decrease her chronic risk of bleeding.

## 2018-08-09 NOTE — PROGRESS NOTES
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 Hepatology is being consulted for management of immunosuppression.    Patient does not recall her regimen, but she states that she takes her medications as stated on the list.  Patient current regimen Cellcept 1000/1000, prograf 7/7, and prednisone 1mg BID. Patient had inconsistencies in her medical regimen in the past, and it is not clear if she should be on Cellcept. The patient has history of non-compliance with medications in the past. She has been on prograf 1/1 a few months ago.    Interval History:   Patient underwent EAU with packing. No bleeding noted today. Patient states that she feels well. No acute complaints.       Current Facility-Administered Medications   Medication    0.9%  NaCl infusion (for blood administration)    acetaminophen tablet 650 mg    cinacalcet tablet 30 mg    citalopram tablet 20 mg    cloNIDine 0.3 mg/24 hr td ptwk 1 patch    conjugated estrogens vaginal cream 2 g    dextrose 50% injection 12.5 g    dextrose 50% injection 25 g    famotidine tablet 20 mg    ferric subsulfate solution    glucagon (human recombinant) injection 1 mg    glucose chewable tablet 16 g    glucose chewable tablet 24 g    hydrALAZINE tablet 100 mg    labetalol tablet 400 mg    lisinopril tablet 40 mg    metroNIDAZOLE tablet 500 mg    NIFEdipine 24 hr tablet 120 mg    ondansetron tablet 4 mg     oxyCODONE immediate release tablet 10 mg    predniSONE tablet 1 mg    sodium chloride 0.9% flush 5 mL    tacrolimus capsule 8 mg       Objective:     Vital Signs (Most Recent):  Temp: 96 °F (35.6 °C) (08/09/18 0732)  Pulse: 88 (08/09/18 1128)  Resp: 18 (08/09/18 1128)  BP: 116/71 (08/09/18 0732)  SpO2: 100 % (08/09/18 1128) Vital Signs (24h Range):  Temp:  [96 °F (35.6 °C)-98.4 °F (36.9 °C)] 96 °F (35.6 °C)  Pulse:  [] 88  Resp:  [16-20] 18  SpO2:  [96 %-100 %] 100 %  BP: (116-177)/() 116/71     Weight: 52.6 kg (116 lb) (08/08/18 2011)  Body mass index is 19.3 kg/m².    Physical Exam   Constitutional: She is oriented to person, place, and time. She appears well-developed and well-nourished. No distress.   HENT:   Head: Normocephalic and atraumatic.   Eyes: EOM are normal. Pupils are equal, round, and reactive to light.   Neck: Normal range of motion. Neck supple.   Cardiovascular: Normal rate and regular rhythm.    Pulmonary/Chest: Effort normal and breath sounds normal.   Abdominal: Soft. Bowel sounds are normal. She exhibits no distension. There is no tenderness. There is no guarding.   Musculoskeletal: Normal range of motion.   Neurological: She is alert and oriented to person, place, and time.   Skin: Skin is warm and dry.   Psychiatric: She has a normal mood and affect.       MELD-Na score: 21 at 8/9/2018  4:40 AM  MELD score: 21 at 8/9/2018  4:40 AM  Calculated from:  Serum Creatinine: 0  Serum Sodium: 137 mmol/L at 8/9/2018  4:40 AM  Total Bilirubin: 0.7 mg/dL (Rounded to 1) at 8/9/2018  4:40 AM  INR(ratio): 1.1 at 8/7/2018  8:06 AM  Age: 27 years    Significant Labs:  CBC:   Recent Labs  Lab 08/09/18  0440   WBC 5.82   RBC 2.77*   HGB 8.0*   HCT 24.0*   PLT 85*     BMP:   Recent Labs  Lab 08/09/18  0440   GLU 92      K 4.6      CO2 26   BUN 33*   CREATININE 7.1*   CALCIUM 8.6*     CMP:   Recent Labs  Lab 08/09/18  0440   GLU 92   CALCIUM 8.6*   ALBUMIN 2.4*   PROT 6.6      K  4.6   CO2 26      BUN 33*   CREATININE 7.1*   ALKPHOS 590*   ALT 23   AST 26   BILITOT 0.7     Coagulation:   Recent Labs  Lab 08/07/18  0806   INR 1.1       Significant Imaging:  Labs: Reviewed    Assessment/Plan:     Liver replaced by transplant    27 year old female with a history of ESRD on HD and s/p LTx in 1992 for a hemangioendotheliamo with history of chronic rejection currently admitted with acute blood loss anemia secondary to vaginal bleeding. Hepatology is being consulted for management of immunosuppression.     Unclear if the patient is taking cellcept at this time. Would hold now given that it might be contributing to thrombocytopenia and increase her tacrolimus dose. Her Tacrolimus level is 3.3. Will increased tacrolimus to 8/8 and continue prednisone EOD     Recommendations:  --Daily CMP, CBC, and INR  --Daily Tacrolimus  --Increase tacrolimus to 8/8  --continue to hold Cellcept for now as it is potentially contributing to her thrombocytopenia  --If the patient is actively bleeding recommend DDAVP as she likely has platelet dysfunction on top of chronic thrombocytopenia. Will consider splenic embolization for thrombocytoopenia in attempt to decrease her chronic risk of bleeding.            Thank you for your consult. I will follow-up with patient. Please contact us if you have any additional questions.    Jenniffer Servin MD  Hepatology  [facility]

## 2018-08-09 NOTE — ASSESSMENT & PLAN NOTE
- hepatology consulted for immunosuppression management  - continue home dose prednisone  - hold cellcept  - prograf increased to 8 mg bid  - daily prograf levels

## 2018-08-09 NOTE — ASSESSMENT & PLAN NOTE
Vaginal Bleeding  - has been an ongoing problem due to vaginal bleeding after LEEP procedure  - gyn consulted - performed EUA 8/8 with below findings  1. Malodorous vaginal bleeding/discharge  2. Cervix visualized with clot in place, no active bleeding. Sutures from prior surgeries remain visible and intact. Insufficient cervix for Endoloop.  3. Monsel's and packing placed (packing with Monsel's superiorly and Premarin throughout)    - H/H stable. Transfuse to keep Hb > 7  - No bleeding today.   - Cont BP control

## 2018-08-09 NOTE — PROGRESS NOTES
[facility] Hospital Medicine  Progress Note    Patient Name: Holly Patel  MRN: [id number]  Patient Class: IP- Inpatient   Admission Date: 8/7/2018  Length of Stay: 2 days  Attending Physician: Katie Horvath MD  Primary Care Provider: Stan Sosa MD    Hospital Medicine Team: [facility] HOSP MED G Katie Horvath MD    Subjective:     Principal Problem:Acute blood loss anemia    HPI:  The patient is a 26 y/o female with PMH of ESRD on HD MWF, h/o liver transplant on immunosuppression with prograf and prednisone, persistent vaginal bleeding after LEEP procedure in June 2018, poorly controlle HTN, and diastolic HF, and anemia from blood loss. She had had a recent admit for same about a month ago. She underwent LEEP procedure in June 2018 but despite evaluation by GYN she continues to bleeding. Since her last discharge from [facility] she was admitted under gyn service at the end of July and DC summary per Dr. Sims in GYN as per below:    Patient underwent EUA with placement of sturmdorf sutures, and treatment of vagianl atrophy with premarin cream. She did not require transfusion. She was dialyzed x2 during this admission. Her bleeding stopped on POD#3, and she is stable for discharge with close follow up and precautions.  She often requires multiple transfusions and BP control has always been uncontrolled.     She reported that she continued to bleed since then and just could not continue with the bleeding and that is why she is presenting today. She saturates two pads per hour but denies any sob, cp, dizziness or LOC. She reports compliance with fluids and salt and all of her meds.           Hospital Course:  No notes on file    Interval History  Feeling well today. Denies bleeding today.     Review of Systems   Constitutional: Negative for chills, fatigue and fever.   HENT: Negative for congestion and sneezing.    Eyes: Negative for visual disturbance.   Respiratory: Negative for cough and  shortness of breath.    Cardiovascular: Negative for chest pain and palpitations.   Gastrointestinal: Negative for abdominal pain and diarrhea.   Genitourinary: Positive for vaginal bleeding.   Musculoskeletal: Negative for arthralgias.   Skin: Negative for rash.   Neurological: Negative for light-headedness and headaches.   All other systems reviewed and are negative.    Objective:     Vital Signs (Most Recent):  Temp: 97.5 °F (36.4 °C) (08/09/18 1525)  Pulse: 95 (08/09/18 1525)  Resp: 18 (08/09/18 1525)  BP: 134/85 (08/09/18 1525)  SpO2: 100 % (08/09/18 1525) Vital Signs (24h Range):  Temp:  [96 °F (35.6 °C)-98.4 °F (36.9 °C)] 97.5 °F (36.4 °C)  Pulse:  [] 95  Resp:  [16-20] 18  SpO2:  [96 %-100 %] 100 %  BP: (116-168)/() 134/85     Weight: 52.6 kg (116 lb)  Body mass index is 19.3 kg/m².    Physical Exam   Constitutional: She is oriented to person, place, and time. She appears well-developed and well-nourished. No distress.   HENT:   Head: Normocephalic and atraumatic.   Eyes: EOM are normal.   Neck: Normal range of motion.   Cardiovascular: Normal rate, regular rhythm, normal heart sounds and intact distal pulses.    Pulmonary/Chest: Effort normal and breath sounds normal.   Abdominal: Soft. Bowel sounds are normal. There is no tenderness.   Musculoskeletal: She exhibits no edema.   Neurological: She is alert and oriented to person, place, and time.   Skin: No erythema.   Vitals reviewed.        CRANIAL NERVES     CN III, IV, VI   Extraocular motions are normal.        Significant Labs: All pertinent labs within the past 24 hours have been reviewed.    Significant Imaging: I have reviewed all pertinent imaging results/findings within the past 24 hours.    Assessment/Plan:      * Acute blood loss anemia    Vaginal Bleeding  - has been an ongoing problem due to vaginal bleeding after LEEP procedure  - gyn consulted - performed EUA 8/8 with below findings  1. Malodorous vaginal bleeding/discharge  2.  Cervix visualized with clot in place, no active bleeding. Sutures from prior surgeries remain visible and intact. Insufficient cervix for Endoloop.  3. Monsel's and packing placed (packing with Monsel's superiorly and Premarin throughout)    - H/H stable. Transfuse to keep Hb > 7  - No bleeding today.   - Cont BP control          Vaginal infection    -gyn recommends flagyl          Vaginal bleeding    - transfuse to keep Hb > 7  - appreciate gyn consult           Thrombocytopenia    - platelets > 50K   - no need for transfusion   - continue to monitor           Anemia in ESRD (end-stage renal disease)    - see acute blood loss anemia           Renovascular hypertension    - patient poorly controlled  - continue HD per nephrology  - resume all home meds  - appreciate nephrology assistance for BP control           ESRD on hemodialysis    - on HD MWF  - nephrology following for HD  - continue cinacalcet  - continue renal diet           Prophylactic immunotherapy    - per liver transplant           Liver replaced by transplant    - hepatology consulted for immunosuppression management  - continue home dose prednisone  - hold cellcept  - prograf increased to 8 mg bid  - daily prograf levels             VTE Risk Mitigation         Ordered     IP VTE HIGH RISK PATIENT  Once      08/07/18 1528     Place LINDA hose  Until discontinued      08/07/18 1528     Place sequential compression device  Until discontinued      08/07/18 1528     Reason for No Pharmacological VTE Prophylaxis  Once      08/07/18 1528              Katie Horvath MD  Department of Hospital Medicine   [facility]

## 2018-08-09 NOTE — PROGRESS NOTES
Progress Note  Gynecology    Admit Date: 2018  LOS: 2    Reason for Admission:  Acute blood loss anemia    SUBJECTIVE:     Holly Patel is a 27 y.o.  with PMHx significant for liver transplant on chronic immunosuppression, poorly controlled HTN, ESRD on HD, and known non-compliance who presents in the ED with continued vaginal bleeding. Ms. Patel is well-known to GYN service. Patient had a LEEP on 6/15/18 and has had intermittent issues with bleeding since then. See initial consult note for full postop course details.    POD#1 s/p EUA/packing. Mild bleeding controlled with monsels and premarin packing, not enough cervix to place endoloop. See op note for full details. Patient reports no bleeding through the packing overnight. Denies anemia symptoms, fever, chills, nausea, vomiting. Tolerating regular diet. Received HD yesterday. No new complaints.    OBJECTIVE:     Vital Signs   Temp:  [96.8 °F (36 °C)-98.4 °F (36.9 °C)] 98 °F (36.7 °C)  Pulse:  [] 97  Resp:  [16-21] 17  SpO2:  [96 %-100 %] 98 %  BP: (115-188)/() 148/99      Intake/Output Summary (Last 24 hours) at 18 0623  Last data filed at 18 1302   Gross per 24 hour   Intake               50 ml   Output             2300 ml   Net            -2250 ml       Physical Exam:  Gen: A&Ox3, NAD, resting comfortably  CV: RRR  Pulm: normal respiratory effort  Abd: soft, non-distended, non-tender to palpation without rebound or guarding  Pelvic: deferred  Ext: no peripheral edema, TEDs/SCDs in place    Laboratory:    Recent Labs  Lab 18  0806 18  0429 18  0440   WBC 4.13 5.67 5.82   HGB 6.1* 7.4* 8.0*   HCT 19.6* 22.4* 24.0*   MCV 88 86 87   PLT 72* 82* 85*        Diagnostic Results:  None new for GYN    ASSESSMENT/PLAN:     Active Hospital Problems    Diagnosis  POA    *Acute blood loss anemia [D62]  Yes    Vaginal infection [N76.0]  Yes    Vaginal bleeding [N93.9]  Yes    Thrombocytopenia [D69.6]  Yes     Anemia in ESRD (end-stage renal disease) [N18.6, D63.1]  Yes     Chronic    Renovascular hypertension [I15.0]  Yes     Chronic    ESRD on hemodialysis [N18.6, Z99.2]  Not Applicable     Chronic    Prophylactic immunotherapy [Z29.8]  Not Applicable   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 [Z94.4]  Not Applicable     Chronic     hemangioendothelioma s/p LTx (1992)        Resolved Hospital Problems    Diagnosis Date Resolved POA   No resolved problems to display.       Persistent postop vaginal bleeding after LEEP 6/15/18  - given pattern of severe HTN and bleeding, strongly feel that persistent bleeding is due to uncontrolled HTN, likely due to non-compliance with medications and HD  - s/p EUA with packing as above, packing remains in place. Plan to change out packing later today if any bleeding remains vs just remove packing if minimal to no bleeding present.  - continue flagyl 500 mg BID for vaginal infection on exam  - continue premarin cream 2g nightly  - medical management per primary team, appreciate any help with BP control and compliance strategies  - consider SW consult to explore options for long-term care to keep BP under control while cervix is healing to prevent frequent readmissions that many services feel are related to non-compliance at home and with HD schedule     GYN will continue to follow. Please call with questions or acute change in bleeding.     Roberta Ruiz MD  OBGYN - PGY 4

## 2018-08-09 NOTE — SUBJECTIVE & OBJECTIVE
Interval History  Feeling well today. Denies bleeding today.     Review of Systems   Constitutional: Negative for chills, fatigue and fever.   HENT: Negative for congestion and sneezing.    Eyes: Negative for visual disturbance.   Respiratory: Negative for cough and shortness of breath.    Cardiovascular: Negative for chest pain and palpitations.   Gastrointestinal: Negative for abdominal pain and diarrhea.   Genitourinary: Positive for vaginal bleeding.   Musculoskeletal: Negative for arthralgias.   Skin: Negative for rash.   Neurological: Negative for light-headedness and headaches.   All other systems reviewed and are negative.    Objective:     Vital Signs (Most Recent):  Temp: 97.5 °F (36.4 °C) (08/09/18 1525)  Pulse: 95 (08/09/18 1525)  Resp: 18 (08/09/18 1525)  BP: 134/85 (08/09/18 1525)  SpO2: 100 % (08/09/18 1525) Vital Signs (24h Range):  Temp:  [96 °F (35.6 °C)-98.4 °F (36.9 °C)] 97.5 °F (36.4 °C)  Pulse:  [] 95  Resp:  [16-20] 18  SpO2:  [96 %-100 %] 100 %  BP: (116-168)/() 134/85     Weight: 52.6 kg (116 lb)  Body mass index is 19.3 kg/m².    Physical Exam   Constitutional: She is oriented to person, place, and time. She appears well-developed and well-nourished. No distress.   HENT:   Head: Normocephalic and atraumatic.   Eyes: EOM are normal.   Neck: Normal range of motion.   Cardiovascular: Normal rate, regular rhythm, normal heart sounds and intact distal pulses.    Pulmonary/Chest: Effort normal and breath sounds normal.   Abdominal: Soft. Bowel sounds are normal. There is no tenderness.   Musculoskeletal: She exhibits no edema.   Neurological: She is alert and oriented to person, place, and time.   Skin: No erythema.   Vitals reviewed.        CRANIAL NERVES     CN III, IV, VI   Extraocular motions are normal.        Significant Labs: All pertinent labs within the past 24 hours have been reviewed.    Significant Imaging: I have reviewed all pertinent imaging results/findings within the  past 24 hours.

## 2018-08-10 LAB
ALBUMIN SERPL BCP-MCNC: 2.4 G/DL
ALP SERPL-CCNC: 554 U/L
ALT SERPL W/O P-5'-P-CCNC: 19 U/L
ANION GAP SERPL CALC-SCNC: 10 MMOL/L
AST SERPL-CCNC: 21 U/L
BASOPHILS # BLD AUTO: 0.02 K/UL
BASOPHILS NFR BLD: 0.3 %
BILIRUB SERPL-MCNC: 0.6 MG/DL
BUN SERPL-MCNC: 44 MG/DL
CALCIUM SERPL-MCNC: 8.8 MG/DL
CHLORIDE SERPL-SCNC: 102 MMOL/L
CO2 SERPL-SCNC: 25 MMOL/L
CREAT SERPL-MCNC: 8.6 MG/DL
DIFFERENTIAL METHOD: ABNORMAL
EOSINOPHIL # BLD AUTO: 0.5 K/UL
EOSINOPHIL NFR BLD: 7.3 %
ERYTHROCYTE [DISTWIDTH] IN BLOOD BY AUTOMATED COUNT: 15.7 %
EST. GFR  (AFRICAN AMERICAN): 6.6 ML/MIN/1.73 M^2
EST. GFR  (NON AFRICAN AMERICAN): 5.7 ML/MIN/1.73 M^2
GLUCOSE SERPL-MCNC: 85 MG/DL
HCT VFR BLD AUTO: 26.3 %
HGB BLD-MCNC: 8.2 G/DL
IMM GRANULOCYTES # BLD AUTO: 0.03 K/UL
IMM GRANULOCYTES NFR BLD AUTO: 0.4 %
LYMPHOCYTES # BLD AUTO: 0.9 K/UL
LYMPHOCYTES NFR BLD: 12.6 %
MAGNESIUM SERPL-MCNC: 2.2 MG/DL
MCH RBC QN AUTO: 27.8 PG
MCHC RBC AUTO-ENTMCNC: 31.2 G/DL
MCV RBC AUTO: 89 FL
MONOCYTES # BLD AUTO: 0.3 K/UL
MONOCYTES NFR BLD: 5 %
NEUTROPHILS # BLD AUTO: 5.1 K/UL
NEUTROPHILS NFR BLD: 74.4 %
NRBC BLD-RTO: 0 /100 WBC
PHOSPHATE SERPL-MCNC: 5.1 MG/DL
PLATELET # BLD AUTO: 107 K/UL
PMV BLD AUTO: 11.3 FL
POTASSIUM SERPL-SCNC: 4.7 MMOL/L
PROT SERPL-MCNC: 6.6 G/DL
RBC # BLD AUTO: 2.95 M/UL
SODIUM SERPL-SCNC: 137 MMOL/L
TACROLIMUS BLD-MCNC: 5.2 NG/ML
WBC # BLD AUTO: 6.81 K/UL

## 2018-08-10 PROCEDURE — 63600175 PHARM REV CODE 636 W HCPCS: Performed by: HOSPITALIST

## 2018-08-10 PROCEDURE — 25000003 PHARM REV CODE 250: Performed by: NURSE PRACTITIONER

## 2018-08-10 PROCEDURE — 99233 SBSQ HOSP IP/OBS HIGH 50: CPT | Mod: GC,,, | Performed by: INTERNAL MEDICINE

## 2018-08-10 PROCEDURE — 90935 HEMODIALYSIS ONE EVALUATION: CPT

## 2018-08-10 PROCEDURE — 84100 ASSAY OF PHOSPHORUS: CPT

## 2018-08-10 PROCEDURE — 85025 COMPLETE CBC W/AUTO DIFF WBC: CPT

## 2018-08-10 PROCEDURE — 83735 ASSAY OF MAGNESIUM: CPT

## 2018-08-10 PROCEDURE — 80197 ASSAY OF TACROLIMUS: CPT

## 2018-08-10 PROCEDURE — 80053 COMPREHEN METABOLIC PANEL: CPT

## 2018-08-10 PROCEDURE — 36415 COLL VENOUS BLD VENIPUNCTURE: CPT

## 2018-08-10 PROCEDURE — 25000003 PHARM REV CODE 250: Performed by: HOSPITALIST

## 2018-08-10 PROCEDURE — 63600175 PHARM REV CODE 636 W HCPCS: Mod: JG | Performed by: NURSE PRACTITIONER

## 2018-08-10 PROCEDURE — 90935 HEMODIALYSIS ONE EVALUATION: CPT | Mod: ,,, | Performed by: INTERNAL MEDICINE

## 2018-08-10 PROCEDURE — 63600175 PHARM REV CODE 636 W HCPCS: Performed by: STUDENT IN AN ORGANIZED HEALTH CARE EDUCATION/TRAINING PROGRAM

## 2018-08-10 PROCEDURE — 99232 SBSQ HOSP IP/OBS MODERATE 35: CPT | Mod: ,,, | Performed by: HOSPITALIST

## 2018-08-10 PROCEDURE — 11000001 HC ACUTE MED/SURG PRIVATE ROOM

## 2018-08-10 PROCEDURE — 25000003 PHARM REV CODE 250: Performed by: STUDENT IN AN ORGANIZED HEALTH CARE EDUCATION/TRAINING PROGRAM

## 2018-08-10 RX ORDER — SODIUM CHLORIDE 9 MG/ML
INJECTION, SOLUTION INTRAVENOUS ONCE
Status: COMPLETED | OUTPATIENT
Start: 2018-08-10 | End: 2018-08-10

## 2018-08-10 RX ADMIN — ONDANSETRON 4 MG: 4 TABLET, FILM COATED ORAL at 06:08

## 2018-08-10 RX ADMIN — HYDRALAZINE HYDROCHLORIDE 100 MG: 50 TABLET ORAL at 01:08

## 2018-08-10 RX ADMIN — CINACALCET HYDROCHLORIDE 30 MG: 30 TABLET, COATED ORAL at 01:08

## 2018-08-10 RX ADMIN — LABETALOL HCL 400 MG: 200 TABLET, FILM COATED ORAL at 09:08

## 2018-08-10 RX ADMIN — METRONIDAZOLE 500 MG: 500 TABLET ORAL at 01:08

## 2018-08-10 RX ADMIN — PREDNISONE 1 MG: 1 TABLET ORAL at 01:08

## 2018-08-10 RX ADMIN — LABETALOL HCL 400 MG: 200 TABLET, FILM COATED ORAL at 05:08

## 2018-08-10 RX ADMIN — METRONIDAZOLE 500 MG: 500 TABLET ORAL at 09:08

## 2018-08-10 RX ADMIN — ERYTHROPOIETIN 10000 UNITS: 10000 INJECTION, SOLUTION INTRAVENOUS; SUBCUTANEOUS at 11:08

## 2018-08-10 RX ADMIN — FAMOTIDINE 20 MG: 20 TABLET ORAL at 01:08

## 2018-08-10 RX ADMIN — OXYCODONE HYDROCHLORIDE 10 MG: 5 TABLET ORAL at 12:08

## 2018-08-10 RX ADMIN — TACROLIMUS 8 MG: 5 CAPSULE ORAL at 01:08

## 2018-08-10 RX ADMIN — CITALOPRAM HYDROBROMIDE 20 MG: 20 TABLET ORAL at 01:08

## 2018-08-10 RX ADMIN — TACROLIMUS 8 MG: 5 CAPSULE ORAL at 06:08

## 2018-08-10 RX ADMIN — HYDRALAZINE HYDROCHLORIDE 100 MG: 50 TABLET ORAL at 04:08

## 2018-08-10 RX ADMIN — LABETALOL HCL 400 MG: 200 TABLET, FILM COATED ORAL at 03:08

## 2018-08-10 RX ADMIN — NIFEDIPINE 120 MG: 30 TABLET, FILM COATED, EXTENDED RELEASE ORAL at 01:08

## 2018-08-10 RX ADMIN — SODIUM CHLORIDE: 0.9 INJECTION, SOLUTION INTRAVENOUS at 11:08

## 2018-08-10 RX ADMIN — HYDRALAZINE HYDROCHLORIDE 100 MG: 50 TABLET ORAL at 08:08

## 2018-08-10 NOTE — ASSESSMENT & PLAN NOTE
Vaginal Bleeding  - has been an ongoing problem due to vaginal bleeding after LEEP procedure  - gyn consulted - performed EUA 8/8 with below findings  1. Malodorous vaginal bleeding/discharge  2. Cervix visualized with clot in place, no active bleeding. Sutures from prior surgeries remain visible and intact. Insufficient cervix for Endoloop.  3. Monsel's and packing placed (packing with Monsel's superiorly and Premarin throughout)    - H/H stable. Transfuse to keep Hb > 7  - No bleeding today.   - Cont BP control  - Gyn recommending that patient stay in hospital or facility for BP control and close monitoring for bleeding

## 2018-08-10 NOTE — PROGRESS NOTES
Maintenance HD completed @1142, blood returned and 15g needles remove from LLFA fistula w/o difficulty, pressure held to site until hemostasis achieved, gauze and tape applied. Vitals WNL, pt denies pain or discomfort. 2L of fluid removed over 3.5hr HD tx. Report called to nurse informed of EPO being administered on Hd and that EPO that was sent was in pt's chart. Pt of floor via stretcher accompanied by transport

## 2018-08-10 NOTE — PLAN OF CARE
Problem: Patient Care Overview  Goal: Plan of Care Review  Outcome: Ongoing (interventions implemented as appropriate)  Explained to pt HD duration and and mat to be removed

## 2018-08-10 NOTE — ASSESSMENT & PLAN NOTE
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 Hepatology is being consulted for management of immunosuppression.     Unclear if the patient is taking cellcept at this time. Would hold now given that it might be contributing to thrombocytopenia and increase her tacrolimus dose. Her Tacrolimus level is 5.2 up from 3.3 today. Will c/w tacrolimus to 8/8 and continue prednisone EOD     Recommendations:  --Daily CMP, CBC, and INR  --Daily Tacrolimus  --Continue tacrolimus to 8/8  --continue to hold Cellcept for now as it is potentially contributing to her thrombocytopenia; platelet count improving; continue to monitor.  --If the patient is actively bleeding recommend DDAVP as she likely has platelet dysfunction on top of chronic thrombocytopenia. If continued bleeding, we might consider splenic embolization for thrombocytopenia in attempt to decrease her chronic risk of bleeding.

## 2018-08-10 NOTE — PROGRESS NOTES
I personally saw and examined the patient on hemodialysis, tolerating treatment, see hemodyalisis flowsheet. I also reviewed the chart and current medication. The dialysis bath was adjusted.   Target UF 2 liter, patient doing well. Stable BP.  Will give her 14401 units of epo today on HD.

## 2018-08-10 NOTE — PROGRESS NOTES
[facility] Hospital Medicine  Progress Note    Patient Name: Holly Patel  MRN: [id number]  Patient Class: IP- Inpatient   Admission Date: 8/7/2018  Length of Stay: 3 days  Attending Physician: Katie Horvath MD  Primary Care Provider: Stan Sosa MD    [place]ospital Medicine [place] [facility] [place] Katie Horvath MD    Subjective:     Principal Problem:Acute blood loss anemia    HPI:  The patient is a 28 y/o female with PMH of ESRD on HD MWF, h/o liver transplant on immunosuppression with prograf and prednisone, persistent vaginal bleeding after LEEP procedure in June 2018, poorly controlle HTN, and diastolic HF, and anemia from blood loss. She had had a recent admit for same about a month ago. She underwent LEEP procedure in June 2018 but despite evaluation by GYN she continues to bleeding. [place] [facility] [place] gyn service at the end of July and DC summary per Dr. Sims in GYN as per below:    Patient underwent EUA with placement of sturmdorf sutures, and treatment of vagianl atrophy with premarin cream. She did not require transfusion. She was dialyzed x2 during this admission. Her bleeding stopped on POD#3, and she is stable for discharge with close follow up and precautions.  She often requires multiple transfusions and BP control has always been uncontrolled.     She reported that she continued to bleed since then and just could not continue with the bleeding and that is why she is presenting today. She saturates two pads per hour but denies any sob, cp, dizziness or LOC. She reports compliance with fluids and salt and all of her meds.           Hospital Course:  No notes on file    Interval History  Tolerating HD well. No complaints. No cervical bleeding, per Gyn.    Review of Systems   Constitutional: Negative for chills, fatigue and fever.   HENT: Negative for congestion and sneezing.    Eyes: Negative for visual disturbance.   Respiratory: Negative  for cough and shortness of breath.    Cardiovascular: Negative for chest pain and palpitations.   Gastrointestinal: Negative for abdominal pain and diarrhea.   Genitourinary: Negative for vaginal bleeding.   Musculoskeletal: Negative for arthralgias.   Skin: Negative for rash.   Neurological: Negative for light-headedness and headaches.   All other systems reviewed and are negative.    Objective:     Vital Signs (Most Recent):  Temp: 97.5 °F (36.4 °C) (08/10/18 1226)  Pulse: 94 (08/10/18 1226)  Resp: 16 (08/10/18 1226)  BP: 135/81 (08/10/18 1226)  SpO2: 100 % (08/10/18 1226) Vital Signs (24h Range):  Temp:  [96.5 °F (35.8 °C)-97.7 °F (36.5 °C)] 97.5 °F (36.4 °C)  Pulse:  [] 94  Resp:  [16-18] 16  SpO2:  [99 %-100 %] 100 %  BP: ()/(54-99) 135/81     Weight: 52.6 kg (116 lb)  Body mass index is 19.3 kg/m².    Physical Exam   Constitutional: She is oriented to person, place, and time. She appears well-developed and well-nourished. No distress.   HENT:   Head: Normocephalic and atraumatic.   Eyes: EOM are normal.   Neck: Normal range of motion.   Cardiovascular: Normal rate, regular rhythm, normal heart sounds and intact distal pulses.    Pulmonary/Chest: Effort normal and breath sounds normal.   Abdominal: Soft. Bowel sounds are normal. There is no tenderness.   Musculoskeletal: She exhibits no edema.   Neurological: She is alert and oriented to person, place, and time.   Skin: No erythema.   Vitals reviewed.        CRANIAL NERVES     CN III, IV, VI   Extraocular motions are normal.        Significant Labs: All pertinent labs within the past 24 hours have been reviewed.    Significant Imaging: I have reviewed all pertinent imaging results/findings within the past 24 hours.    Assessment/Plan:      * Acute blood loss anemia    Vaginal Bleeding  - has been an ongoing problem due to vaginal bleeding after LEEP procedure  - gyn consulted - performed EUA 8/8 with below findings  1. Malodorous vaginal  bleeding/discharge  2. Cervix visualized with clot in place, no active bleeding. Sutures from prior surgeries remain visible and intact. Insufficient cervix for Endoloop.  3. Monsel's and packing placed (packing with Monsel's superiorly and Premarin throughout)    - H/H stable. Transfuse to keep Hb > 7  - No bleeding today.   - Cont BP control  - Gyn recommending that patient stay in hospital or facility for BP control and close monitoring for bleeding          Vaginal infection    -gyn recommends flagyl          Vaginal bleeding    - transfuse to keep Hb > 7  - appreciate gyn consult           Thrombocytopenia    - platelets > 50K   - no need for transfusion   - continue to monitor           Anemia in ESRD (end-stage renal disease)    - see acute blood loss anemia           Renovascular hypertension    - patient poorly controlled  - continue HD per nephrology  - resume all home meds  - appreciate nephrology assistance for BP control           ESRD on hemodialysis    - on HD MWF  - nephrology following for HD  - continue cinacalcet  - continue renal diet           Prophylactic immunotherapy    - per liver transplant           Liver replaced by transplant    - hepatology consulted for immunosuppression management  - continue home dose prednisone  - hold cellcept  - prograf increased to 8 mg bid  - daily prograf levels             VTE Risk Mitigation         Ordered     IP VTE HIGH RISK PATIENT  Once      08/07/18 1528     Place LINDA hose  Until discontinued      08/07/18 1528     Place sequential compression device  Until discontinued      08/07/18 1528     Reason for No Pharmacological VTE Prophylaxis  Once      08/07/18 1528              Katie Horvath MD  Department of Hospital Medicine   [facility]

## 2018-08-10 NOTE — SUBJECTIVE & OBJECTIVE
Interval History  Tolerating HD well. No complaints. No cervical bleeding, per Gyn.    Review of Systems   Constitutional: Negative for chills, fatigue and fever.   HENT: Negative for congestion and sneezing.    Eyes: Negative for visual disturbance.   Respiratory: Negative for cough and shortness of breath.    Cardiovascular: Negative for chest pain and palpitations.   Gastrointestinal: Negative for abdominal pain and diarrhea.   Genitourinary: Negative for vaginal bleeding.   Musculoskeletal: Negative for arthralgias.   Skin: Negative for rash.   Neurological: Negative for light-headedness and headaches.   All other systems reviewed and are negative.    Objective:     Vital Signs (Most Recent):  Temp: 97.5 °F (36.4 °C) (08/10/18 1226)  Pulse: 94 (08/10/18 1226)  Resp: 16 (08/10/18 1226)  BP: 135/81 (08/10/18 1226)  SpO2: 100 % (08/10/18 1226) Vital Signs (24h Range):  Temp:  [96.5 °F (35.8 °C)-97.7 °F (36.5 °C)] 97.5 °F (36.4 °C)  Pulse:  [] 94  Resp:  [16-18] 16  SpO2:  [99 %-100 %] 100 %  BP: ()/(54-99) 135/81     Weight: 52.6 kg (116 lb)  Body mass index is 19.3 kg/m².    Physical Exam   Constitutional: She is oriented to person, place, and time. She appears well-developed and well-nourished. No distress.   HENT:   Head: Normocephalic and atraumatic.   Eyes: EOM are normal.   Neck: Normal range of motion.   Cardiovascular: Normal rate, regular rhythm, normal heart sounds and intact distal pulses.    Pulmonary/Chest: Effort normal and breath sounds normal.   Abdominal: Soft. Bowel sounds are normal. There is no tenderness.   Musculoskeletal: She exhibits no edema.   Neurological: She is alert and oriented to person, place, and time.   Skin: No erythema.   Vitals reviewed.        CRANIAL NERVES     CN III, IV, VI   Extraocular motions are normal.        Significant Labs: All pertinent labs within the past 24 hours have been reviewed.    Significant Imaging: I have reviewed all pertinent imaging  results/findings within the past 24 hours.

## 2018-08-10 NOTE — PROGRESS NOTES
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 Hepatology is being consulted for management of immunosuppression.    Patient does not recall her regimen, but she states that she takes her medications as stated on the list.  Patient current regimen Cellcept 1000/1000, prograf 7/7, and prednisone 1mg BID. Patient had inconsistencies in her medical regimen in the past, and it is not clear if she should be on Cellcept. The patient has history of non-compliance with medications in the past. She has been on prograf 1/1 a few months ago.    Interval History:   Patient feeling well today. Packing changed yesterday, with small amount of blood and little oozing from the cervix. Hb remained stable, and the patient did not require transfusions.    Current Facility-Administered Medications   Medication    0.9%  NaCl infusion (for blood administration)    0.9%  NaCl infusion    acetaminophen tablet 650 mg    cinacalcet tablet 30 mg    citalopram tablet 20 mg    cloNIDine 0.3 mg/24 hr td ptwk 1 patch    conjugated estrogens vaginal cream 2 g    dextrose 50% injection 12.5 g    dextrose 50% injection 25 g    famotidine tablet 20 mg    ferric subsulfate solution    glucagon (human recombinant) injection 1 mg    glucose chewable tablet 16 g    glucose chewable tablet 24 g    hydrALAZINE tablet 100 mg    labetalol tablet 400 mg    lisinopril tablet 40 mg    metroNIDAZOLE  tablet 500 mg    NIFEdipine 24 hr tablet 120 mg    ondansetron tablet 4 mg    oxyCODONE immediate release tablet 10 mg    predniSONE tablet 1 mg    sodium chloride 0.9% flush 5 mL    tacrolimus capsule 8 mg       Objective:     Vital Signs (Most Recent):  Temp: 97.6 °F (36.4 °C) (08/10/18 0726)  Pulse: 88 (08/10/18 1115)  Resp: 17 (08/10/18 0726)  BP: (!) 95/54 (08/10/18 1115)  SpO2: 99 % (08/10/18 0415) Vital Signs (24h Range):  Temp:  [96.1 °F (35.6 °C)-97.6 °F (36.4 °C)] 97.6 °F (36.4 °C)  Pulse:  [] 88  Resp:  [17-18] 17  SpO2:  [99 %-100 %] 99 %  BP: ()/(54-99) 95/54     Weight: 52.6 kg (116 lb) (08/08/18 2011)  Body mass index is 19.3 kg/m².    Physical Exam   Constitutional: She is oriented to person, place, and time. She appears well-developed and well-nourished.   HENT:   Head: Normocephalic and atraumatic.   Eyes: Pupils are equal, round, and reactive to light. No scleral icterus.   Neck: Normal range of motion. Neck supple.   Cardiovascular: Normal rate and regular rhythm.    Pulmonary/Chest: Effort normal and breath sounds normal.   Abdominal: Soft. Bowel sounds are normal. She exhibits no distension. There is no tenderness.   Musculoskeletal: Normal range of motion.   Neurological: She is alert and oriented to person, place, and time.   Skin: Skin is warm and dry.   Psychiatric: She has a normal mood and affect.       MELD-Na score: 21 at 8/9/2018  4:40 AM  MELD score: 21 at 8/9/2018  4:40 AM  Calculated from:  Serum Creatinine: 0  Serum Sodium: 137 mmol/L at 8/9/2018  4:40 AM  Total Bilirubin: 0.7 mg/dL (Rounded to 1) at 8/9/2018  4:40 AM  INR(ratio): 1.1 at 8/7/2018  8:06 AM  Age: 27 years    Significant Labs:  CBC:   Recent Labs  Lab 08/10/18  0440   WBC 6.81   RBC 2.95*   HGB 8.2*   HCT 26.3*   *     BMP:   Recent Labs  Lab 08/10/18  0439   GLU 85      K 4.7      CO2 25   BUN 44*   CREATININE 8.6*   CALCIUM 8.8     CMP:   Recent Labs  Lab 08/10/18  0439   GLU 85    CALCIUM 8.8   ALBUMIN 2.4*   PROT 6.6      K 4.7   CO2 25      BUN 44*   CREATININE 8.6*   ALKPHOS 554*   ALT 19   AST 21   BILITOT 0.6     Coagulation:   Recent Labs  Lab 08/07/18  0806   INR 1.1       Significant Imaging:  Labs: Reviewed    Assessment/Plan:     Liver replaced by transplant    27 year old female with a history of ESRD on HD and s/p LTx in 1992 for a hemangioendotheliamo with history of chronic rejection currently admitted with acute blood loss anemia secondary to vaginal bleeding. Hepatology is being consulted for management of immunosuppression.     Unclear if the patient is taking cellcept at this time. Would hold now given that it might be contributing to thrombocytopenia and increase her tacrolimus dose. Her Tacrolimus level is 5.2 up from 3.3 today. Will c/w tacrolimus to 8/8 and continue prednisone EOD     Recommendations:  --Daily CMP, CBC, and INR  --Daily Tacrolimus  --Continue tacrolimus to 8/8  --continue to hold Cellcept for now as it is potentially contributing to her thrombocytopenia; platelet count improving; continue to monitor.  --If the patient is actively bleeding recommend DDAVP as she likely has platelet dysfunction on top of chronic thrombocytopenia. If continued bleeding, we might consider splenic embolization for thrombocytopenia in attempt to decrease her chronic risk of bleeding.            Thank you for your consult. I will follow-up with patient. Please contact us if you have any additional questions.    Jenniffer Servin MD  Hepatology  [facility]

## 2018-08-10 NOTE — PROGRESS NOTES
Progress Note  Gynecology    Admit Date: 2018  LOS: 3    Reason for Admission:  Acute blood loss anemia    SUBJECTIVE:     Holly Patel is a 27 y.o.  with PMHx significant for liver transplant on chronic immunosuppression, poorly controlled HTN, ESRD on HD, and known non-compliance who presents in the ED with continued vaginal bleeding. Ms. Patel is well-known to GYN service. Patient had a LEEP on 6/15/18 and has had intermittent issues with bleeding since then. See initial consult note for full postop course details.    POD#2 s/p EUA/packing. Monsels packing changed at bedside yesterday. Minimal blood on packing. Slight oozing from cervix on speculum exam. Repacked with monsels and premarin coated kerlix. Denies any bleeding through the packing overnight. Denies anemia symptoms, fever, chills, nausea, vomiting. Tolerating regular diet. No new complaints.    OBJECTIVE:     Vital Signs   Temp:  [96 °F (35.6 °C)-97.5 °F (36.4 °C)] 96.8 °F (36 °C)  Pulse:  [] 92  Resp:  [16-18] 18  SpO2:  [99 %-100 %] 99 %  BP: (116-158)/(71-99) 125/74    Physical Exam:  Gen: A&Ox3, NAD, resting comfortably  CV: RRR  Pulm: normal respiratory effort  Abd: soft, mildly distended, non-tender to palpation without rebound or guarding  Pelvic: internal exam deferred this AM, packing in place, no blood on pad, only monsels seepage  Ext: no peripheral edema    Laboratory:    Recent Labs  Lab 18  0806 18  0429 18  0440   WBC 4.13 5.67 5.82   HGB 6.1* 7.4* 8.0*   HCT 19.6* 22.4* 24.0*   MCV 88 86 87   PLT 72* 82* 85*        Diagnostic Results:  None new for GYN    ASSESSMENT/PLAN:     Active Hospital Problems    Diagnosis  POA    *Acute blood loss anemia [D62]  Yes    Vaginal infection [N76.0]  Yes    Vaginal bleeding [N93.9]  Yes    Thrombocytopenia [D69.6]  Yes    Anemia in ESRD (end-stage renal disease) [N18.6, D63.1]  Yes     Chronic    Renovascular hypertension [I15.0]  Yes     Chronic     ESRD on hemodialysis [N18.6, Z99.2]  Not Applicable     Chronic    Prophylactic immunotherapy [Z29.8]  Not Applicable   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 [Z94.4]  Not Applicable     Chronic     hemangioendothelioma s/p LTx (1992)        Resolved Hospital Problems    Diagnosis Date Resolved POA   No resolved problems to display.       Persistent postop vaginal bleeding after LEEP 6/15/18  - given pattern of severe HTN and bleeding, strongly feel that persistent bleeding is due to uncontrolled HTN, likely due to non-compliance with medications and HD  - s/p EUA with packing as above, packing changed yesterday, remains in place. Will discuss with staff regarding plans for repacking vs pad counts.  - follow cervical exam closely  - continue flagyl 500 mg BID for vaginal infection on exam  - continue premarin cream 2g nightly  - medical management per primary team, appreciate any help with BP control and compliance strategies  - recommend patient remain in a facility (inpatient vs SNF if qualifies) where BP can be optimally controlled, HD can be received, and bleeding can be monitored. We feel that if BP remains controlled for long enough, likely 2-3 weeks although may be longer with patient's chronic illnesses, that the cervix will be able to heal without further significant bleeding issues. At this point, hysterectomy is not an option as intrabdominal bleeding issues from that would be life-threatening, and she does not have enough remaining cervix to perform any other hemostatic procedures on the tissue. GYN is limited to packing and hemostatic agents for bleeding control and premarin cream to encourage healing as our only options. Therefore keeping BP controlled is imperative to prevention of further bleeding.     GYN will continue to follow. Please call with questions or acute change in bleeding.     Roberta Ruiz MD  OBGYN - PGY 4

## 2018-08-10 NOTE — SUBJECTIVE & OBJECTIVE
Interval History:   Patient feeling well today. Packing changed yesterday, with small amount of blood and little oozing from the cervix. Hb remained stable, and the patient did not require transfusions.    Current Facility-Administered Medications   Medication    0.9%  NaCl infusion (for blood administration)    0.9%  NaCl infusion    acetaminophen tablet 650 mg    cinacalcet tablet 30 mg    citalopram tablet 20 mg    cloNIDine 0.3 mg/24 hr td ptwk 1 patch    conjugated estrogens vaginal cream 2 g    dextrose 50% injection 12.5 g    dextrose 50% injection 25 g    famotidine tablet 20 mg    ferric subsulfate solution    glucagon (human recombinant) injection 1 mg    glucose chewable tablet 16 g    glucose chewable tablet 24 g    hydrALAZINE tablet 100 mg    labetalol tablet 400 mg    lisinopril tablet 40 mg    metroNIDAZOLE tablet 500 mg    NIFEdipine 24 hr tablet 120 mg    ondansetron tablet 4 mg    oxyCODONE immediate release tablet 10 mg    predniSONE tablet 1 mg    sodium chloride 0.9% flush 5 mL    tacrolimus capsule 8 mg       Objective:     Vital Signs (Most Recent):  Temp: 97.6 °F (36.4 °C) (08/10/18 0726)  Pulse: 88 (08/10/18 1115)  Resp: 17 (08/10/18 0726)  BP: (!) 95/54 (08/10/18 1115)  SpO2: 99 % (08/10/18 0415) Vital Signs (24h Range):  Temp:  [96.1 °F (35.6 °C)-97.6 °F (36.4 °C)] 97.6 °F (36.4 °C)  Pulse:  [] 88  Resp:  [17-18] 17  SpO2:  [99 %-100 %] 99 %  BP: ()/(54-99) 95/54     Weight: 52.6 kg (116 lb) (08/08/18 2011)  Body mass index is 19.3 kg/m².    Physical Exam   Constitutional: She is oriented to person, place, and time. She appears well-developed and well-nourished.   HENT:   Head: Normocephalic and atraumatic.   Eyes: Pupils are equal, round, and reactive to light. No scleral icterus.   Neck: Normal range of motion. Neck supple.   Cardiovascular: Normal rate and regular rhythm.    Pulmonary/Chest: Effort normal and breath sounds normal.   Abdominal: Soft.  Bowel sounds are normal. She exhibits no distension. There is no tenderness.   Musculoskeletal: Normal range of motion.   Neurological: She is alert and oriented to person, place, and time.   Skin: Skin is warm and dry.   Psychiatric: She has a normal mood and affect.       MELD-Na score: 21 at 8/9/2018  4:40 AM  MELD score: 21 at 8/9/2018  4:40 AM  Calculated from:  Serum Creatinine: 0  Serum Sodium: 137 mmol/L at 8/9/2018  4:40 AM  Total Bilirubin: 0.7 mg/dL (Rounded to 1) at 8/9/2018  4:40 AM  INR(ratio): 1.1 at 8/7/2018  8:06 AM  Age: 27 years    Significant Labs:  CBC:   Recent Labs  Lab 08/10/18  0440   WBC 6.81   RBC 2.95*   HGB 8.2*   HCT 26.3*   *     BMP:   Recent Labs  Lab 08/10/18  0439   GLU 85      K 4.7      CO2 25   BUN 44*   CREATININE 8.6*   CALCIUM 8.8     CMP:   Recent Labs  Lab 08/10/18  0439   GLU 85   CALCIUM 8.8   ALBUMIN 2.4*   PROT 6.6      K 4.7   CO2 25      BUN 44*   CREATININE 8.6*   ALKPHOS 554*   ALT 19   AST 21   BILITOT 0.6     Coagulation:   Recent Labs  Lab 08/07/18  0806   INR 1.1       Significant Imaging:  Labs: Reviewed

## 2018-08-10 NOTE — PROGRESS NOTES
CN received report from nurse, pt arrived via stretcher, ambulatory and AAOX4, denies pain or discomfort. Left FA buttonhole +bruit/thrill accessed w/ 15g button hole needles w/o difficulty. Lines secured w/ tape nad blue clamp. Maintenance HD goals to remove 2L of fluid over 3.5hrs bp permitting. Pt denies pain, discomfort or SOB at this time

## 2018-08-11 LAB
ALBUMIN SERPL BCP-MCNC: 2.5 G/DL
ALP SERPL-CCNC: 543 U/L
ALT SERPL W/O P-5'-P-CCNC: 15 U/L
ANION GAP SERPL CALC-SCNC: 10 MMOL/L
AST SERPL-CCNC: 21 U/L
BASOPHILS # BLD AUTO: 0.02 K/UL
BASOPHILS NFR BLD: 0.4 %
BILIRUB SERPL-MCNC: 0.6 MG/DL
BUN SERPL-MCNC: 20 MG/DL
CALCIUM SERPL-MCNC: 7.8 MG/DL
CHLORIDE SERPL-SCNC: 98 MMOL/L
CO2 SERPL-SCNC: 26 MMOL/L
CREAT SERPL-MCNC: 5.3 MG/DL
DIFFERENTIAL METHOD: ABNORMAL
EOSINOPHIL # BLD AUTO: 0.4 K/UL
EOSINOPHIL NFR BLD: 8.5 %
ERYTHROCYTE [DISTWIDTH] IN BLOOD BY AUTOMATED COUNT: 15.4 %
EST. GFR  (AFRICAN AMERICAN): 11.9 ML/MIN/1.73 M^2
EST. GFR  (NON AFRICAN AMERICAN): 10.3 ML/MIN/1.73 M^2
GLUCOSE SERPL-MCNC: 84 MG/DL
HCT VFR BLD AUTO: 24.9 %
HGB BLD-MCNC: 8.2 G/DL
IMM GRANULOCYTES # BLD AUTO: 0.02 K/UL
IMM GRANULOCYTES NFR BLD AUTO: 0.4 %
LYMPHOCYTES # BLD AUTO: 0.8 K/UL
LYMPHOCYTES NFR BLD: 15.6 %
MAGNESIUM SERPL-MCNC: 2 MG/DL
MCH RBC QN AUTO: 28.1 PG
MCHC RBC AUTO-ENTMCNC: 32.9 G/DL
MCV RBC AUTO: 85 FL
MONOCYTES # BLD AUTO: 0.3 K/UL
MONOCYTES NFR BLD: 5.2 %
NEUTROPHILS # BLD AUTO: 3.6 K/UL
NEUTROPHILS NFR BLD: 69.9 %
NRBC BLD-RTO: 0 /100 WBC
PHOSPHATE SERPL-MCNC: 4.5 MG/DL
PLATELET # BLD AUTO: 103 K/UL
PMV BLD AUTO: 11.1 FL
POTASSIUM SERPL-SCNC: 4.1 MMOL/L
PROT SERPL-MCNC: 6.8 G/DL
RBC # BLD AUTO: 2.92 M/UL
SODIUM SERPL-SCNC: 134 MMOL/L
TACROLIMUS BLD-MCNC: 7.6 NG/ML
WBC # BLD AUTO: 5.19 K/UL

## 2018-08-11 PROCEDURE — 25000003 PHARM REV CODE 250: Performed by: HOSPITALIST

## 2018-08-11 PROCEDURE — 83735 ASSAY OF MAGNESIUM: CPT

## 2018-08-11 PROCEDURE — 80053 COMPREHEN METABOLIC PANEL: CPT

## 2018-08-11 PROCEDURE — 25000003 PHARM REV CODE 250: Performed by: STUDENT IN AN ORGANIZED HEALTH CARE EDUCATION/TRAINING PROGRAM

## 2018-08-11 PROCEDURE — 63600175 PHARM REV CODE 636 W HCPCS: Performed by: STUDENT IN AN ORGANIZED HEALTH CARE EDUCATION/TRAINING PROGRAM

## 2018-08-11 PROCEDURE — 85025 COMPLETE CBC W/AUTO DIFF WBC: CPT

## 2018-08-11 PROCEDURE — 97165 OT EVAL LOW COMPLEX 30 MIN: CPT

## 2018-08-11 PROCEDURE — 80197 ASSAY OF TACROLIMUS: CPT

## 2018-08-11 PROCEDURE — 84100 ASSAY OF PHOSPHORUS: CPT

## 2018-08-11 PROCEDURE — 11000001 HC ACUTE MED/SURG PRIVATE ROOM

## 2018-08-11 PROCEDURE — 99231 SBSQ HOSP IP/OBS SF/LOW 25: CPT | Mod: ,,, | Performed by: HOSPITALIST

## 2018-08-11 PROCEDURE — 36415 COLL VENOUS BLD VENIPUNCTURE: CPT

## 2018-08-11 RX ORDER — HYDRALAZINE HYDROCHLORIDE 25 MG/1
25 TABLET, FILM COATED ORAL EVERY 8 HOURS PRN
Status: DISCONTINUED | OUTPATIENT
Start: 2018-08-11 | End: 2018-08-16 | Stop reason: HOSPADM

## 2018-08-11 RX ADMIN — TACROLIMUS 8 MG: 5 CAPSULE ORAL at 06:08

## 2018-08-11 RX ADMIN — HYDRALAZINE HYDROCHLORIDE 100 MG: 50 TABLET ORAL at 11:08

## 2018-08-11 RX ADMIN — HYDRALAZINE HYDROCHLORIDE 100 MG: 50 TABLET ORAL at 04:08

## 2018-08-11 RX ADMIN — CITALOPRAM HYDROBROMIDE 20 MG: 20 TABLET ORAL at 08:08

## 2018-08-11 RX ADMIN — METRONIDAZOLE 500 MG: 500 TABLET ORAL at 08:08

## 2018-08-11 RX ADMIN — METRONIDAZOLE 500 MG: 500 TABLET ORAL at 09:08

## 2018-08-11 RX ADMIN — LISINOPRIL 40 MG: 20 TABLET ORAL at 08:08

## 2018-08-11 RX ADMIN — LABETALOL HCL 400 MG: 200 TABLET, FILM COATED ORAL at 02:08

## 2018-08-11 RX ADMIN — TACROLIMUS 8 MG: 5 CAPSULE ORAL at 08:08

## 2018-08-11 RX ADMIN — CINACALCET HYDROCHLORIDE 30 MG: 30 TABLET, COATED ORAL at 10:08

## 2018-08-11 RX ADMIN — LABETALOL HCL 400 MG: 200 TABLET, FILM COATED ORAL at 11:08

## 2018-08-11 RX ADMIN — CONJUGATED ESTROGENS 2 G: 0.62 CREAM VAGINAL at 11:08

## 2018-08-11 RX ADMIN — HYDRALAZINE HYDROCHLORIDE 100 MG: 50 TABLET ORAL at 09:08

## 2018-08-11 RX ADMIN — ONDANSETRON 4 MG: 4 TABLET, FILM COATED ORAL at 08:08

## 2018-08-11 RX ADMIN — FAMOTIDINE 20 MG: 20 TABLET ORAL at 08:08

## 2018-08-11 RX ADMIN — NIFEDIPINE 120 MG: 30 TABLET, FILM COATED, EXTENDED RELEASE ORAL at 08:08

## 2018-08-11 RX ADMIN — LABETALOL HCL 400 MG: 200 TABLET, FILM COATED ORAL at 05:08

## 2018-08-11 NOTE — PLAN OF CARE
Problem: Occupational Therapy Goal  Goal: Occupational Therapy Goal  Pt is currently performing ADLs, functional mobility & t/fs at baseline and displays age-appropriate strength, endurance & balance. OT services are not recommended at this time.    Outcome: Ongoing (interventions implemented as appropriate)  Evaluation completed. D/c home with no OT or DME services.   Honey mejia OT  8/11/2018

## 2018-08-11 NOTE — PT/OT/SLP EVAL
Occupational Therapy   Evaluation and Discharge Note    Name: Holly Patel  MRN: [id number]  Admitting Diagnosis:  Acute blood loss anemia 3 Days Post-Op    Recommendations:     Discharge Recommendations: home  Discharge Equipment Recommendations:  none  Barriers to discharge:  None    History:     Occupational Profile:  Living Environment: Pt lives with mother in 1SH with 0STE; bathroom contains tub-shower combo with no DME.  Previous level of function: PTA, pt reports being I with ADL and functional mobility. Reports she has not driven in a long time.  Equipment Owned:  none  Assistance upon Discharge: She will have assistance from mother and sister upon discharge    Past Medical History:   Diagnosis Date    Anemia in ESRD (end-stage renal disease) 10/12/2015    dialysis tues, thursday, sat; access left arm    Chronic rejection of liver transplant 3/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 ()    Moderate protein-calorie malnutrition 2017    MRSA bacteremia 2017    Pneumonia     Prophylactic immunotherapy 2014    Renovascular hypertension 10/2/2015    Secondary hyperparathyroidism 2017    Seizures     Sialadenitis 3/21/2018    Thrombocytopenia 2016       Past Surgical History:   Procedure Laterality Date     SECTION      x 2    CONIZATION OF CERVIX USING LOOP ELECTROSURGICAL EXCISION PROCEDURE (LEEP) N/A 6/15/2018    Procedure: CONIZATION-CERVICAL-LEEP;  Surgeon: Neelam Marroquin MD;  Location: [facility];  Service: OB/GYN;  Laterality: N/A;    EMBOLIZATION N/A  "7/21/2018    Procedure: EMBOLIZATION, BLOOD VESSEL;  Surgeon: Aren Ramos MD;  Location: [facility] CATH LAB;  Service: Radiology;  Laterality: N/A;    EXAMINATION UNDER ANESTHESIA N/A 6/19/2018    Procedure: Exam under anesthesia;  Surgeon: Neelam Marroquin MD;  Location: [facility] OR;  Service: OB/GYN;  Laterality: N/A;    EXAMINATION UNDER ANESTHESIA N/A 7/9/2018    Procedure: Exam under anesthesia (ADD ON );  Surgeon: NICKIE Alvarez MD;  Location: [facility] OR;  Service: OB/GYN;  Laterality: N/A;  (ADD ON )    EXAMINATION UNDER ANESTHESIA N/A 7/26/2018    Procedure: Exam under anesthesia -cervical suturing ;  Surgeon: Lei Sims III, MD;  Location: [facility] OR;  Service: OB/GYN;  Laterality: N/A;    EXAMINATION UNDER ANESTHESIA N/A 8/8/2018    Procedure: Exam Under Anesthesia;  Surgeon: Neelam Marroquin MD;  Location: [address];  Service: OB/GYN;  Laterality: N/A;  need endoloop  request 12 noon    LIVER BIOPSY      LIVER TRANSPLANT  09/1992    PERINEORRHAPHY  6/19/2018    Procedure: SUTURE REPAIR,CERVIX;  Surgeon: Neelam Marroquin MD;  Location: [facility];  Service: OB/GYN;;    TUBAL LIGATION  2010       Subjective     Chief Complaint: " I feel a little dizzy"  Patient/Family stated goals: Return home  Communicated with: RN prior to session.  Pain/Comfort:  · Pain Rating 1: 0/10  · Pain Rating Post-Intervention 1: 0/10    Patients cultural, spiritual, Taoism conflicts given the current situation:      Objective:     Patient found with: peripheral IV    General Precautions: Standard,     Orthopedic Precautions:N/A   Braces: N/A     Occupational Performance:    Bed Mobility:    · Patient completed Rolling/Turning to Right with independence  · Patient completed Supine to Sit with independence  · Patient completed Sit to Supine with independence    Functional Mobility/Transfers:  · Patient completed Sit <> Stand Transfer with supervision  with  no assistive device   · Functional Mobility: " Pt completed functional mobility in room with supervision and no AD. She tolerated well with no LOB or SOB.     Activities of Daily Living:  · Grooming: independence wash face while seated EOB  · Upper Body Dressing: independence to whit gown like jacket while standing  · Lower Body Dressing: independence to whit B socks while seated EOB    Cognitive/Visual Perceptual:  Cognitive/Psychosocial Skills:     -       Oriented to: Person, Place, Time and Situation   -       Follows Commands/attention:Follows multistep  commands  -       Communication: clear/fluent  -       Memory: No Deficits noted  -       Safety awareness/insight to disability: intact   -       Mood/Affect/Coping skills/emotional control: Appropriate to situation  Visual/Perceptual:      -Intact    Physical Exam:  Postural examination/scapula alignment:    -       No postural abnormalities identified  Skin integrity: Visible skin intact  Edema:  None noted  Sensation:    -       Intact  Dominant hand:    -       RUE  Upper Extremity Range of Motion:     -       Right Upper Extremity: WFL  -       Left Upper Extremity: WFL  Upper Extremity Strength:    -       Right Upper Extremity: WFL  -       Left Upper Extremity: WFL   Strength:    -       Right Upper Extremity: WFL 5/5  -       Left Upper Extremity: WFL  5/5  Fine Motor Coordination:    -       Intact  Gross motor coordination:   WFL    Patient left supine with all lines intact, call button in reach and RN notified    AMPAC 6 Click:  AMPA Total Score: 24    Treatment & Education:  -Pt edu on OT role/POC  -Importance of OOB activity with staff assistance ( UIC during each meal)  -Safety during functional t/f and mobility  - White board updated  - All questions/concerns answered within OT scope of practice    Education:    Assessment:     Holly Patel is a 27 y.o. female with a medical diagnosis of Acute blood loss anemia. At this time, patient is functioning at their prior level of  "function and does not require further acute OT services.     Clinical Decision Makin.  OT Low:  "Pt evaluation falls under low complexity for evaluation coding due to performance deficits noted in 1-3 areas as stated above and 0 co-morbities affecting current functional status. Data obtained from problem focused assessments. No modifications or assistance was required for completion of evaluation. Only brief occupational profile and history review completed."     Plan:     During this hospitalization, patient does not require further acute OT services.  Please re-consult if situation changes.    · Plan of Care Reviewed with: patient    This Plan of care has been discussed with the patient who was involved in its development and understands and is in agreement with the identified goals and treatment plan    GOALS:    Occupational Therapy Goals        Problem: Occupational Therapy Goal    Goal Priority Disciplines Outcome Interventions   Occupational Therapy Goal     OT, PT/OT Ongoing (interventions implemented as appropriate)    Description:  Pt is currently performing ADLs, functional mobility & t/fs at baseline and displays age-appropriate strength, endurance & balance. OT services are not recommended at this time.                      Time Tracking:     OT Date of Treatment: 18  OT Start Time: 0948  OT Stop Time: 1000  OT Total Time (min): 12 min    Billable Minutes:Evaluation 12    Honey mejia OT  2018    "

## 2018-08-11 NOTE — PROGRESS NOTES
[facility] Hospital Medicine  Progress Note    Patient Name: Holly Patel  MRN: [id number]  Patient Class: IP- Inpatient   Admission Date: 8/7/2018  Length of Stay: 4 days  Attending Physician: Katie Horvath MD  Primary Care Provider: Stan Sosa MD    Hospital Medicine Team: [facility] Katie Horvath MD    Subjective:     Principal Problem:Acute blood loss anemia    HPI:  The patient is a 28 y/o female with PMH of ESRD on HD MWF, h/o liver transplant on immunosuppression with prograf and prednisone, persistent vaginal bleeding after LEEP procedure in June 2018, poorly controlle HTN, and diastolic HF, and anemia from blood loss. She had had a recent admit for same about a month ago. She underwent LEEP procedure in June 2018 but despite evaluation by GYN she continues to bleeding. Since her last discharge from [facility] she was admitted under gyn service at the end of July and DC summary per Dr. Sims in GYN as per below:    Patient underwent EUA with placement of sturmdorf sutures, and treatment of vagianl atrophy with premarin cream. She did not require transfusion. She was dialyzed x2 during this admission. Her bleeding stopped on POD#3, and she is stable for discharge with close follow up and precautions.  She often requires multiple transfusions and BP control has always been uncontrolled.     She reported that she continued to bleed since then and just could not continue with the bleeding and that is why she is presenting today. She saturates two pads per hour but denies any sob, cp, dizziness or LOC. She reports compliance with fluids and salt and all of her meds.           Hospital Course:  No notes on file    Interval History  No complaints. No bleeding.    Review of Systems   Constitutional: Negative for chills, fatigue and fever.   HENT: Negative for congestion and sneezing.    Eyes: Negative for visual disturbance.   Respiratory: Negative for cough and shortness of breath.     Cardiovascular: Negative for chest pain and palpitations.   Gastrointestinal: Negative for abdominal pain and diarrhea.   Genitourinary: Negative for vaginal bleeding.   Musculoskeletal: Negative for arthralgias.   Skin: Negative for rash.   Neurological: Negative for light-headedness and headaches.   All other systems reviewed and are negative.    Objective:     Vital Signs (Most Recent):  Temp: 97.7 °F (36.5 °C) (08/11/18 1259)  Pulse: 94 (08/11/18 1259)  Resp: 18 (08/11/18 1259)  BP: 135/78 (08/11/18 1259)  SpO2: 100 % (08/11/18 1259) Vital Signs (24h Range):  Temp:  [97.4 °F (36.3 °C)-98.2 °F (36.8 °C)] 97.7 °F (36.5 °C)  Pulse:  [86-99] 94  Resp:  [16-18] 18  SpO2:  [96 %-100 %] 100 %  BP: (131-141)/(75-84) 135/78     Weight: 52.6 kg (116 lb)  Body mass index is 19.3 kg/m².    Physical Exam   Constitutional: She is oriented to person, place, and time. She appears well-developed and well-nourished. No distress.   HENT:   Head: Normocephalic and atraumatic.   Eyes: EOM are normal.   Neck: Normal range of motion.   Cardiovascular: Normal rate, regular rhythm, normal heart sounds and intact distal pulses.    Pulmonary/Chest: Effort normal and breath sounds normal.   Abdominal: Soft. Bowel sounds are normal. There is no tenderness.   Musculoskeletal: She exhibits no edema.   Neurological: She is alert and oriented to person, place, and time.   Skin: No erythema.   Vitals reviewed.        CRANIAL NERVES     CN III, IV, VI   Extraocular motions are normal.        Significant Labs: All pertinent labs within the past 24 hours have been reviewed.    Significant Imaging: I have reviewed all pertinent imaging results/findings within the past 24 hours.    Assessment/Plan:      * Acute blood loss anemia    Vaginal Bleeding  - has been an ongoing problem due to vaginal bleeding after LEEP procedure  - gyn consulted - performed EUA 8/8 with below findings  1. Malodorous vaginal bleeding/discharge  2. Cervix visualized with  clot in place, no active bleeding. Sutures from prior surgeries remain visible and intact. Insufficient cervix for Endoloop.  3. Monsel's and packing placed (packing with Monsel's superiorly and Premarin throughout)    - H/H stable. Transfuse to keep Hb > 7  - No bleeding today.   - Cont BP control  - Gyn recommending that patient stay in hospital or facility for BP control and close monitoring for bleeding          Vaginal infection    -gyn recommends flagyl          Vaginal bleeding    - transfuse to keep Hb > 7  - appreciate gyn consult           Thrombocytopenia    - platelets > 50K   - no need for transfusion   - continue to monitor           Anemia in ESRD (end-stage renal disease)    - see acute blood loss anemia           Renovascular hypertension    - patient poorly controlled  - continue HD per nephrology  - resume all home meds  - appreciate nephrology assistance for BP control           ESRD on hemodialysis    - on HD MWF  - nephrology following for HD  - continue cinacalcet  - continue renal diet           Prophylactic immunotherapy    - per liver transplant           Liver replaced by transplant    - hepatology consulted for immunosuppression management  - continue home dose prednisone  - hold cellcept  - prograf increased to 8 mg bid  - daily prograf levels             VTE Risk Mitigation         Ordered     IP VTE HIGH RISK PATIENT  Once      08/07/18 1528     Place LINDA hose  Until discontinued      08/07/18 1528     Place sequential compression device  Until discontinued      08/07/18 1528     Reason for No Pharmacological VTE Prophylaxis  Once      08/07/18 1528              Katie Horvath MD  Department of Hospital Medicine   [facility]

## 2018-08-11 NOTE — PLAN OF CARE
Problem: Patient Care Overview  Goal: Plan of Care Review  Outcome: Ongoing (interventions implemented as appropriate)  Pt AAO x 4 Pt still have Nausea during mealtimes. Family at bedside, No vaginal bleeding noted. Pt denies pain. Q 2 hour monitoring for pain and safety. BP stable. Call light in reach.

## 2018-08-11 NOTE — SUBJECTIVE & OBJECTIVE
Interval History  No complaints. No bleeding.    Review of Systems   Constitutional: Negative for chills, fatigue and fever.   HENT: Negative for congestion and sneezing.    Eyes: Negative for visual disturbance.   Respiratory: Negative for cough and shortness of breath.    Cardiovascular: Negative for chest pain and palpitations.   Gastrointestinal: Negative for abdominal pain and diarrhea.   Genitourinary: Negative for vaginal bleeding.   Musculoskeletal: Negative for arthralgias.   Skin: Negative for rash.   Neurological: Negative for light-headedness and headaches.   All other systems reviewed and are negative.    Objective:     Vital Signs (Most Recent):  Temp: 97.7 °F (36.5 °C) (08/11/18 1259)  Pulse: 94 (08/11/18 1259)  Resp: 18 (08/11/18 1259)  BP: 135/78 (08/11/18 1259)  SpO2: 100 % (08/11/18 1259) Vital Signs (24h Range):  Temp:  [97.4 °F (36.3 °C)-98.2 °F (36.8 °C)] 97.7 °F (36.5 °C)  Pulse:  [86-99] 94  Resp:  [16-18] 18  SpO2:  [96 %-100 %] 100 %  BP: (131-141)/(75-84) 135/78     Weight: 52.6 kg (116 lb)  Body mass index is 19.3 kg/m².    Physical Exam   Constitutional: She is oriented to person, place, and time. She appears well-developed and well-nourished. No distress.   HENT:   Head: Normocephalic and atraumatic.   Eyes: EOM are normal.   Neck: Normal range of motion.   Cardiovascular: Normal rate, regular rhythm, normal heart sounds and intact distal pulses.    Pulmonary/Chest: Effort normal and breath sounds normal.   Abdominal: Soft. Bowel sounds are normal. There is no tenderness.   Musculoskeletal: She exhibits no edema.   Neurological: She is alert and oriented to person, place, and time.   Skin: No erythema.   Vitals reviewed.        CRANIAL NERVES     CN III, IV, VI   Extraocular motions are normal.        Significant Labs: All pertinent labs within the past 24 hours have been reviewed.    Significant Imaging: I have reviewed all pertinent imaging results/findings within the past 24 hours.

## 2018-08-12 LAB
ALBUMIN SERPL BCP-MCNC: 2.6 G/DL
ALP SERPL-CCNC: 527 U/L
ALT SERPL W/O P-5'-P-CCNC: 13 U/L
ANION GAP SERPL CALC-SCNC: 11 MMOL/L
AST SERPL-CCNC: 17 U/L
BASOPHILS # BLD AUTO: 0.02 K/UL
BASOPHILS NFR BLD: 0.4 %
BILIRUB SERPL-MCNC: 0.5 MG/DL
BUN SERPL-MCNC: 30 MG/DL
CALCIUM SERPL-MCNC: 7.5 MG/DL
CHLORIDE SERPL-SCNC: 98 MMOL/L
CO2 SERPL-SCNC: 25 MMOL/L
CREAT SERPL-MCNC: 6.9 MG/DL
DIFFERENTIAL METHOD: ABNORMAL
EOSINOPHIL # BLD AUTO: 0.4 K/UL
EOSINOPHIL NFR BLD: 9.3 %
ERYTHROCYTE [DISTWIDTH] IN BLOOD BY AUTOMATED COUNT: 15.6 %
EST. GFR  (AFRICAN AMERICAN): 8.6 ML/MIN/1.73 M^2
EST. GFR  (NON AFRICAN AMERICAN): 7.5 ML/MIN/1.73 M^2
GLUCOSE SERPL-MCNC: 88 MG/DL
HCT VFR BLD AUTO: 23.5 %
HGB BLD-MCNC: 7.7 G/DL
IMM GRANULOCYTES # BLD AUTO: 0.04 K/UL
IMM GRANULOCYTES NFR BLD AUTO: 0.9 %
LYMPHOCYTES # BLD AUTO: 0.8 K/UL
LYMPHOCYTES NFR BLD: 18.6 %
MAGNESIUM SERPL-MCNC: 2.1 MG/DL
MCH RBC QN AUTO: 27.9 PG
MCHC RBC AUTO-ENTMCNC: 32.8 G/DL
MCV RBC AUTO: 85 FL
MONOCYTES # BLD AUTO: 0.3 K/UL
MONOCYTES NFR BLD: 6.9 %
NEUTROPHILS # BLD AUTO: 2.9 K/UL
NEUTROPHILS NFR BLD: 63.9 %
NRBC BLD-RTO: 0 /100 WBC
PHOSPHATE SERPL-MCNC: 4.9 MG/DL
PLATELET # BLD AUTO: 107 K/UL
PMV BLD AUTO: 12 FL
POTASSIUM SERPL-SCNC: 4 MMOL/L
PROT SERPL-MCNC: 6.7 G/DL
RBC # BLD AUTO: 2.76 M/UL
SODIUM SERPL-SCNC: 134 MMOL/L
TACROLIMUS BLD-MCNC: 9.3 NG/ML
WBC # BLD AUTO: 4.51 K/UL

## 2018-08-12 PROCEDURE — 97161 PT EVAL LOW COMPLEX 20 MIN: CPT

## 2018-08-12 PROCEDURE — 11000001 HC ACUTE MED/SURG PRIVATE ROOM

## 2018-08-12 PROCEDURE — 85025 COMPLETE CBC W/AUTO DIFF WBC: CPT

## 2018-08-12 PROCEDURE — 99233 SBSQ HOSP IP/OBS HIGH 50: CPT | Mod: GC,,, | Performed by: INTERNAL MEDICINE

## 2018-08-12 PROCEDURE — 99231 SBSQ HOSP IP/OBS SF/LOW 25: CPT | Mod: ,,, | Performed by: HOSPITALIST

## 2018-08-12 PROCEDURE — 63600175 PHARM REV CODE 636 W HCPCS: Performed by: HOSPITALIST

## 2018-08-12 PROCEDURE — 83735 ASSAY OF MAGNESIUM: CPT

## 2018-08-12 PROCEDURE — 80053 COMPREHEN METABOLIC PANEL: CPT

## 2018-08-12 PROCEDURE — 63600175 PHARM REV CODE 636 W HCPCS: Performed by: STUDENT IN AN ORGANIZED HEALTH CARE EDUCATION/TRAINING PROGRAM

## 2018-08-12 PROCEDURE — 25000003 PHARM REV CODE 250: Performed by: STUDENT IN AN ORGANIZED HEALTH CARE EDUCATION/TRAINING PROGRAM

## 2018-08-12 PROCEDURE — 25000003 PHARM REV CODE 250: Performed by: HOSPITALIST

## 2018-08-12 PROCEDURE — 36415 COLL VENOUS BLD VENIPUNCTURE: CPT

## 2018-08-12 PROCEDURE — 80197 ASSAY OF TACROLIMUS: CPT

## 2018-08-12 PROCEDURE — 84100 ASSAY OF PHOSPHORUS: CPT

## 2018-08-12 RX ORDER — SODIUM CHLORIDE 9 MG/ML
INJECTION, SOLUTION INTRAVENOUS ONCE
Status: COMPLETED | OUTPATIENT
Start: 2018-08-12 | End: 2018-08-13

## 2018-08-12 RX ORDER — SODIUM CHLORIDE 9 MG/ML
INJECTION, SOLUTION INTRAVENOUS
Status: DISCONTINUED | OUTPATIENT
Start: 2018-08-12 | End: 2018-08-16 | Stop reason: HOSPADM

## 2018-08-12 RX ADMIN — HYDRALAZINE HYDROCHLORIDE 100 MG: 50 TABLET ORAL at 08:08

## 2018-08-12 RX ADMIN — TACROLIMUS 8 MG: 5 CAPSULE ORAL at 08:08

## 2018-08-12 RX ADMIN — PREDNISONE 1 MG: 1 TABLET ORAL at 09:08

## 2018-08-12 RX ADMIN — HYDRALAZINE HYDROCHLORIDE 100 MG: 50 TABLET ORAL at 05:08

## 2018-08-12 RX ADMIN — FAMOTIDINE 20 MG: 20 TABLET ORAL at 09:08

## 2018-08-12 RX ADMIN — HYDRALAZINE HYDROCHLORIDE 100 MG: 50 TABLET ORAL at 12:08

## 2018-08-12 RX ADMIN — LABETALOL HCL 400 MG: 200 TABLET, FILM COATED ORAL at 04:08

## 2018-08-12 RX ADMIN — LABETALOL HCL 400 MG: 200 TABLET, FILM COATED ORAL at 09:08

## 2018-08-12 RX ADMIN — CONJUGATED ESTROGENS 2 G: 0.62 CREAM VAGINAL at 09:08

## 2018-08-12 RX ADMIN — TACROLIMUS 7 MG: 5 CAPSULE ORAL at 06:08

## 2018-08-12 RX ADMIN — CITALOPRAM HYDROBROMIDE 20 MG: 20 TABLET ORAL at 09:08

## 2018-08-12 RX ADMIN — METRONIDAZOLE 500 MG: 500 TABLET ORAL at 09:08

## 2018-08-12 RX ADMIN — LISINOPRIL 40 MG: 20 TABLET ORAL at 09:08

## 2018-08-12 RX ADMIN — LABETALOL HCL 400 MG: 200 TABLET, FILM COATED ORAL at 05:08

## 2018-08-12 RX ADMIN — NIFEDIPINE 120 MG: 30 TABLET, FILM COATED, EXTENDED RELEASE ORAL at 09:08

## 2018-08-12 NOTE — SUBJECTIVE & OBJECTIVE
Interval History  No complaints. No bleeding. Asking when she can discharged.     Review of Systems   Constitutional: Negative for chills, fatigue and fever.   HENT: Negative for congestion and sneezing.    Eyes: Negative for visual disturbance.   Respiratory: Negative for cough and shortness of breath.    Cardiovascular: Negative for chest pain and palpitations.   Gastrointestinal: Negative for abdominal pain and diarrhea.   Genitourinary: Negative for vaginal bleeding.   Musculoskeletal: Negative for arthralgias.   Skin: Negative for rash.   Neurological: Negative for light-headedness and headaches.   All other systems reviewed and are negative.    Objective:     Vital Signs (Most Recent):  Temp: 97.1 °F (36.2 °C) (08/12/18 1151)  Pulse: 79 (08/12/18 1151)  Resp: 16 (08/12/18 1151)  BP: 128/79 (08/12/18 1151)  SpO2: 99 % (08/12/18 1151) Vital Signs (24h Range):  Temp:  [96.6 °F (35.9 °C)-97.8 °F (36.6 °C)] 97.1 °F (36.2 °C)  Pulse:  [79-86] 79  Resp:  [16-18] 16  SpO2:  [99 %-100 %] 99 %  BP: (109-137)/(57-85) 128/79     Weight: 52.6 kg (116 lb)  Body mass index is 19.3 kg/m².    Physical Exam   Constitutional: She is oriented to person, place, and time. She appears well-developed and well-nourished. No distress.   HENT:   Head: Normocephalic and atraumatic.   Eyes: EOM are normal.   Neck: Normal range of motion.   Cardiovascular: Normal rate, regular rhythm, normal heart sounds and intact distal pulses.   Pulmonary/Chest: Effort normal and breath sounds normal.   Abdominal: Soft. Bowel sounds are normal. There is no tenderness.   Musculoskeletal: She exhibits no edema.   Neurological: She is alert and oriented to person, place, and time.   Skin: No erythema.   Vitals reviewed.        CRANIAL NERVES     CN III, IV, VI   Extraocular motions are normal.        Significant Labs: All pertinent labs within the past 24 hours have been reviewed.    Significant Imaging: I have reviewed all pertinent imaging  results/findings within the past 24 hours.

## 2018-08-12 NOTE — PROGRESS NOTES
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 Hepatology is being consulted for management of immunosuppression.    Patient does not recall her regimen, but she states that she takes her medications as stated on the list.  Patient current regimen Cellcept 1000/1000, prograf 7/7, and prednisone 1mg BID. Patient had inconsistencies in her medical regimen in the past, and it is not clear if she should be on Cellcept. The patient has history of non-compliance with medications in the past. She has been on prograf 1/1 a few months ago.    Interval History: Patient feeling well today.   No bleeding today.   Hb remained stable, and the patient did not require transfusions.      Current Facility-Administered Medications   Medication    0.9%  NaCl infusion (for blood administration)    acetaminophen tablet 650 mg    cinacalcet tablet 30 mg    citalopram tablet 20 mg    cloNIDine 0.3 mg/24 hr td ptwk 1 patch    conjugated estrogens vaginal cream 2 g    dextrose 50% injection 12.5 g    dextrose 50% injection 25 g    famotidine tablet 20 mg    ferric subsulfate solution    glucagon (human recombinant) injection 1 mg    glucose chewable tablet 16 g    glucose chewable tablet 24 g    hydrALAZINE tablet 100 mg    hydrALAZINE tablet 25 mg    labetalol tablet 400 mg    lisinopril tablet 40 mg    metroNIDAZOLE tablet 500 mg    NIFEdipine 24 hr tablet 120 mg    ondansetron  tablet 4 mg    oxyCODONE immediate release tablet 10 mg    predniSONE tablet 1 mg    sodium chloride 0.9% flush 5 mL    tacrolimus capsule 7 mg    [START ON 8/13/2018] tacrolimus capsule 8 mg       Objective:     Vital Signs (Most Recent):  Temp: 97.1 °F (36.2 °C) (08/12/18 1151)  Pulse: 79 (08/12/18 1151)  Resp: 16 (08/12/18 1151)  BP: 128/79 (08/12/18 1151)  SpO2: 99 % (08/12/18 1151) Vital Signs (24h Range):  Temp:  [96.6 °F (35.9 °C)-97.8 °F (36.6 °C)] 97.1 °F (36.2 °C)  Pulse:  [79-86] 79  Resp:  [16-18] 16  SpO2:  [99 %-100 %] 99 %  BP: (109-137)/(57-85) 128/79     Weight: 52.6 kg (116 lb) (08/08/18 2011)  Body mass index is 19.3 kg/m².    Physical Exam   Constitutional: She is oriented to person, place, and time. She appears well-developed and well-nourished.   Eyes: EOM are normal. Pupils are equal, round, and reactive to light.   Neck: Normal range of motion. Neck supple.   Cardiovascular: Normal rate and regular rhythm.   Pulmonary/Chest: Effort normal and breath sounds normal.   Abdominal: Soft. Bowel sounds are normal. She exhibits no distension. There is no tenderness.   Musculoskeletal: Normal range of motion.   Neurological: She is alert and oriented to person, place, and time.   Skin: Skin is warm and dry.   Psychiatric: She has a normal mood and affect.       MELD-Na score: 21 at 8/9/2018  4:40 AM  MELD score: 21 at 8/9/2018  4:40 AM  Calculated from:  Serum Creatinine: 0  Serum Sodium: 137 mmol/L at 8/9/2018  4:40 AM  Total Bilirubin: 0.7 mg/dL (Rounded to 1 mg/dL) at 8/9/2018  4:40 AM  INR(ratio): 1.1 at 8/7/2018  8:06 AM  Age: 27 years    Significant Labs:  CBC:   Recent Labs   Lab  08/12/18   0427   WBC  4.51   RBC  2.76*   HGB  7.7*   HCT  23.5*   PLT  107*     BMP:   Recent Labs   Lab  08/12/18 0427   GLU  88   NA  134*   K  4.0   CL  98   CO2  25   BUN  30*   CREATININE  6.9*   CALCIUM  7.5*     CMP:   Recent Labs   Lab  08/12/18 0427   GLU  88   CALCIUM  7.5*   ALBUMIN  2.6*   PROT   6.7   NA  134*   K  4.0   CO2  25   CL  98   BUN  30*   CREATININE  6.9*   ALKPHOS  527*   ALT  13   AST  17   BILITOT  0.5     Coagulation:   Recent Labs   Lab  08/07/18   0806   INR  1.1       Significant Imaging:  Labs: Reviewed    Assessment/Plan:     Liver replaced by transplant    27 year old female with a history of ESRD on HD and s/p LTx in 1992 for a hemangioendotheliamo with history of chronic rejection currently admitted with acute blood loss anemia secondary to vaginal bleeding. Hepatology is being consulted for management of immunosuppression.     Unclear if the patient is taking cellcept at this time. Would hold now given that it might be contributing to thrombocytopenia and increase her tacrolimus dose. Her Tacrolimus level is 9.3 today. Will reduce this as it is above target of 5-8.     Recommendations:  --Daily CMP, CBC, and INR  --Daily Tacrolimus  --Decrease tacrolimus to 8/7 (from 8/8)  --continue to hold Cellcept for now as it is potentially contributing to her thrombocytopenia; platelet count improving; continue to monitor.            Thank you for your consult. I will follow-up with patient. Please contact us if you have any additional questions.    Jenniffer Servin MD  Hepatology  [facility]

## 2018-08-12 NOTE — ASSESSMENT & PLAN NOTE
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 Hepatology is being consulted for management of immunosuppression.     Unclear if the patient is taking cellcept at this time. Would hold now given that it might be contributing to thrombocytopenia and increase her tacrolimus dose. Her Tacrolimus level is 9.3 today. Will reduce this as it is above target of 5-8.     Recommendations:  --Daily CMP, CBC, and INR  --Daily Tacrolimus  --Decrease tacrolimus to 8/7 (from 8/8)  --continue to hold Cellcept for now as it is potentially contributing to her thrombocytopenia; platelet count improving; continue to monitor.

## 2018-08-12 NOTE — PLAN OF CARE
Problem: Physical Therapy Goal  Goal: Physical Therapy Goal  Pt does not require additional acute PT services at this time d/t independent c functional mobility.     Pt educated on asking medical staff for PT consult if changes in functional status occurs. - v/u        Outcome: Outcome(s) achieved Date Met: 08/12/18  Eval and D/C from acute PT services    Dylan Grover DPT  8/12/2018

## 2018-08-12 NOTE — PT/OT/SLP EVAL
"Physical Therapy Evaluation and Discharge Note    Patient Name:  Holly Patel   MRN:  [id number]    Recommendations:     Discharge Recommendations:  home   Discharge Equipment Recommendations: none   Barriers to discharge: None    Assessment:     Holly Patel is a 27 y.o. female admitted with a medical diagnosis of Acute blood loss anemia. .  At this time, patient is functioning at their prior level of function and does not require further acute PT services.     Recent Surgery: Procedure(s) (LRB):  Exam Under Anesthesia (N/A) 4 Days Post-Op    Plan:     During this hospitalization, patient does not require further acute PT services.  Please re-consult if situation changes.     Plan of Care Reviewed with: patient    Subjective     Communicated with RN prior to session.  Patient found supine upon PT entry to room, agreeable to evaluation.      Chief Complaint: none  Patient comments/goals: "I walked yesterday with my mom."  Pain/Comfort:  · Pain Rating 1: 0/10    Patients cultural, spiritual, Mu-ism conflicts given the current situation: none    Living Environment:  Pt lives c mother in [facility].  PTA no hx of falls and not driving/working.  Prior to admission, patients level of function was independent.  Patient has the following equipment: none.  DME owned (not currently used): none.  Upon discharge, patient will have assistance from mother.    Objective:     Patient found with: peripheral IV     General Precautions: Standard,     Orthopedic Precautions:N/A   Braces: N/A     Exams:  · Cognitive Exam:  Patient is oriented to Person, Place, Time and Situation and follows 100% of all commands   · Gross Motor Coordination:  WFL  · RLE ROM: WFL  · RLE Strength: WFL  · LLE ROM: WFL  · LLE Strength: WFL    Functional Mobility:  · Bed Mobility:     · Rolling Right: independence  · Scooting: independence  · Supine to Sit: independence  · Sit to Supine: independence  · Transfers:     · Sit to Stand:  " independence with no AD  · Gait: 200ft c no AD (I)  · Balance: dynamic standing (I)  · Stairs:  Pt ascended/descended 10 stair(s) with No Assistive Device with left handrail with Independent.     AM-PAC 6 CLICK MOBILITY  Total Score:24       Therapeutic Activities and Exercises:   Educated on PT role/POC; safety c mobility; benefits of OOB activities; d/c recs    Patient left supine with call button in reach and RN notified.    GOALS:   Multidisciplinary Problems     Physical Therapy Goals     Not on file          Multidisciplinary Problems (Resolved)        Problem: Physical Therapy Goal    Goal Priority Disciplines Outcome Goal Variances Interventions   Physical Therapy Goal   (Resolved)     PT, PT/OT Outcome(s) achieved     Description:  Pt does not require additional acute PT services at this time d/t independent c functional mobility.     Pt educated on asking medical staff for PT consult if changes in functional status occurs. - v/u                          History:     Past Medical History:   Diagnosis Date    Anemia in ESRD (end-stage renal disease) 10/12/2015    dialysis tues, thursday, sat; access left arm    Chronic rejection of liver transplant 3/22/2016    Depression     Encounter for blood transfusion     ESRD on hemodialysis 9/30/2015    History of recent hospitalization 05/2018    pneumonia    History of splenomegaly 4/12/2016    Immunosuppressed 8/5/2017    Iron deficiency anemia secondary to inadequate dietary iron intake 8/16/2017    She [facility].   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)    Moderate protein-calorie malnutrition 8/16/2017    MRSA bacteremia 8/6/2017    Pneumonia     Prophylactic immunotherapy 8/4/2014    Renovascular hypertension 10/2/2015    Secondary hyperparathyroidism 8/5/2017    Seizures     Sialadenitis 3/21/2018    Thrombocytopenia 4/12/2016       Past Surgical History:    Procedure Laterality Date     SECTION      x 2    LIVER BIOPSY      LIVER TRANSPLANT  1992    TUBAL LIGATION         Clinical Decision Making:     History  Co-morbidities and personal factors that may impact the plan of care Examination  Body Structures and Functions, activity limitations and participation restrictions that may impact the plan of care Clinical Presentation   Decision Making/ Complexity Score   Co-morbidities:   [] Time since onset of injury / illness / exacerbation  [] Status of current condition  []Patient's cognitive status and safety concerns    [] Multiple Medical Problems (see med hx)  Personal Factors:   [] Patient's age  [] Prior Level of function   [] Patient's home situation (environment and family support)  [] Patient's level of motivation  [] Expected progression of patient      HISTORY:(criteria)    [x] 74935 - no personal factors/history    [] 45575 - has 1-2 personal factor/comorbidity     [] 75004 - has >3 personal factor/comorbidity     Body Regions:  [] Objective examination findings  [] Head     []  Neck  [] Trunk   [] Upper Extremity  [] Lower Extremity    Body Systems:  [] For communication ability, affect, cognition, language, and learning style: the assessment of the ability to make needs known, consciousness, orientation (person, place, and time), expected emotional /behavioral responses, and learning preferences (eg, learning barriers, education  needs)  [] For the neuromuscular system: a general assessment of gross coordinated movement (eg, balance, gait, locomotion, transfers, and transitions) and motor function  (motor control and motor learning)  [] For the musculoskeletal system: the assessment of gross symmetry, gross range of motion, gross strength, height, and weight  [] For the integumentary system: the assessment of pliability(texture), presence of scar formation, skin color, and skin integrity  [x] For cardiovascular/pulmonary system: the  assessment of heart rate, respiratory rate, blood pressure, and edema     Activity limitations:    [] Patient's cognitive status and saf ety concerns          [] Status of current condition      [] Weight bearing restriction  [] Cardiopulmunary Restriction    Participation Restrictions:   [] Goals and goal agreement with the patient     [] Rehab potential (prognosis) and probable outcome      Examination of Body System: (criteria)    [x] 09406 - addressing 1-2 elements    [] 73050 - addressing a total of 3 or more elements     [] 15164 -  Addressing a total of 4 or more elements         Clinical Presentation: (criteria)  Stable - 29988     On examination of body system using standardized tests and measures patient presents with 1-2 elements from any of the following: body structures and functions, activity limitations, and/or participation restrictions.  Leading to a clinical presentation that is considered stable and/or uncomplicated                              Clinical Decision Making  (Eval Complexity):  Low- 63818     Time Tracking:     PT Received On: 08/12/18  PT Start Time: 1058     PT Stop Time: 1108  PT Total Time (min): 10 min     Billable Minutes: Evaluation 10 min      Dylan Grover PT  08/12/2018

## 2018-08-12 NOTE — PROGRESS NOTES
[facility] Hospital Medicine  Progress Note    Patient Name: Holly Patel  MRN: [id number]  Patient Class: IP- Inpatient   Admission Date: 8/7/2018  Length of Stay: 5 days  Attending Physician: Katie Horvath MD  Primary Care Provider: Stan Sosa MD    Hospital Medicine Team: [facility] Katie Horvath MD    Subjective:     Principal Problem:Acute blood loss anemia    HPI:  The patient is a 28 y/o female with PMH of ESRD on HD MWF, h/o liver transplant on immunosuppression with prograf and prednisone, persistent vaginal bleeding after LEEP procedure in June 2018, poorly controlle HTN, and diastolic HF, and anemia from blood loss. She had had a recent admit for same about a month ago. She underwent LEEP procedure in June 2018 but despite evaluation by GYN she continues to bleeding. Since her [place] [facility] [place] was admitted under gyn service at the end of July and DC summary per Dr. Sims in GYN as per below:    Patient underwent EUA with placement of sturmdorf sutures, and treatment of vagianl atrophy with premarin cream. She did not require transfusion. She was dialyzed x2 during this admission. Her bleeding stopped on POD#3, and she is stable for discharge with close follow up and precautions.  She often requires multiple transfusions and BP control has always been uncontrolled.     She reported that she continued to bleed since then and just could not continue with the bleeding and that is why she is presenting today. She saturates two pads per hour but denies any sob, cp, dizziness or LOC. She reports compliance with fluids and salt and all of her meds.           Hospital Course:  No notes on file    Interval History  No complaints. No bleeding. Asking when she can discharged.     Review of Systems   Constitutional: Negative for chills, fatigue and fever.   HENT: Negative for congestion and sneezing.    Eyes: Negative for visual disturbance.   Respiratory: Negative for  cough and shortness of breath.    Cardiovascular: Negative for chest pain and palpitations.   Gastrointestinal: Negative for abdominal pain and diarrhea.   Genitourinary: Negative for vaginal bleeding.   Musculoskeletal: Negative for arthralgias.   Skin: Negative for rash.   Neurological: Negative for light-headedness and headaches.   All other systems reviewed and are negative.    Objective:     Vital Signs (Most Recent):  Temp: 97.1 °F (36.2 °C) (08/12/18 1151)  Pulse: 79 (08/12/18 1151)  Resp: 16 (08/12/18 1151)  BP: 128/79 (08/12/18 1151)  SpO2: 99 % (08/12/18 1151) Vital Signs (24h Range):  Temp:  [96.6 °F (35.9 °C)-97.8 °F (36.6 °C)] 97.1 °F (36.2 °C)  Pulse:  [79-86] 79  Resp:  [16-18] 16  SpO2:  [99 %-100 %] 99 %  BP: (109-137)/(57-85) 128/79     Weight: 52.6 kg (116 lb)  Body mass index is 19.3 kg/m².    Physical Exam   Constitutional: She is oriented to person, place, and time. She appears well-developed and well-nourished. No distress.   HENT:   Head: Normocephalic and atraumatic.   Eyes: EOM are normal.   Neck: Normal range of motion.   Cardiovascular: Normal rate, regular rhythm, normal heart sounds and intact distal pulses.   Pulmonary/Chest: Effort normal and breath sounds normal.   Abdominal: Soft. Bowel sounds are normal. There is no tenderness.   Musculoskeletal: She exhibits no edema.   Neurological: She is alert and oriented to person, place, and time.   Skin: No erythema.   Vitals reviewed.        CRANIAL NERVES     CN III, IV, VI   Extraocular motions are normal.        Significant Labs: All pertinent labs within the past 24 hours have been reviewed.    Significant Imaging: I have reviewed all pertinent imaging results/findings within the past 24 hours.    Assessment/Plan:      * Acute blood loss anemia    Vaginal Bleeding  - has been an ongoing problem due to vaginal bleeding after LEEP procedure  - gyn consulted - performed EUA 8/8 with below findings  1. Malodorous vaginal  bleeding/discharge  2. Cervix visualized with clot in place, no active bleeding. Sutures from prior surgeries remain visible and intact. Insufficient cervix for Endoloop.  3. Monsel's and packing placed (packing with Monsel's superiorly and Premarin throughout)    - H/H stable. Transfuse to keep Hb > 7  - No bleeding today.   - Cont BP control  - Gyn recommending that patient stay in hospital or facility for BP control and close monitoring for bleeding          Vaginal infection    -gyn recommends flagyl          Vaginal bleeding    - transfuse to keep Hb > 7  - appreciate gyn consult           Thrombocytopenia    - platelets > 50K   - no need for transfusion   - continue to monitor           Anemia in ESRD (end-stage renal disease)    - see acute blood loss anemia           Renovascular hypertension    - patient poorly controlled  - continue HD per nephrology  - resume all home meds  - appreciate nephrology assistance for BP control           ESRD on hemodialysis    - on HD MWF  - nephrology following for HD  - continue cinacalcet  - continue renal diet           Prophylactic immunotherapy    - per liver transplant           Liver replaced by transplant    - hepatology consulted for immunosuppression management  - continue home dose prednisone  - hold cellcept  - prograf increased to 8 mg bid  - daily prograf levels             VTE Risk Mitigation (From admission, onward)        Ordered     IP VTE HIGH RISK PATIENT  Once      08/07/18 1528     Place LINDA hose  Until discontinued      08/07/18 1528     Place sequential compression device  Until discontinued      08/07/18 1528     Reason for No Pharmacological VTE Prophylaxis  Once      08/07/18 1528              Katie Horvath MD  Department of Hospital Medicine   [facility]

## 2018-08-12 NOTE — SUBJECTIVE & OBJECTIVE
Interval History: Patient feeling well today.   No bleeding today.   Hb remained stable, and the patient did not require transfusions.      Current Facility-Administered Medications   Medication    0.9%  NaCl infusion (for blood administration)    acetaminophen tablet 650 mg    cinacalcet tablet 30 mg    citalopram tablet 20 mg    cloNIDine 0.3 mg/24 hr td ptwk 1 patch    conjugated estrogens vaginal cream 2 g    dextrose 50% injection 12.5 g    dextrose 50% injection 25 g    famotidine tablet 20 mg    ferric subsulfate solution    glucagon (human recombinant) injection 1 mg    glucose chewable tablet 16 g    glucose chewable tablet 24 g    hydrALAZINE tablet 100 mg    hydrALAZINE tablet 25 mg    labetalol tablet 400 mg    lisinopril tablet 40 mg    metroNIDAZOLE tablet 500 mg    NIFEdipine 24 hr tablet 120 mg    ondansetron tablet 4 mg    oxyCODONE immediate release tablet 10 mg    predniSONE tablet 1 mg    sodium chloride 0.9% flush 5 mL    tacrolimus capsule 7 mg    [START ON 8/13/2018] tacrolimus capsule 8 mg       Objective:     Vital Signs (Most Recent):  Temp: 97.1 °F (36.2 °C) (08/12/18 1151)  Pulse: 79 (08/12/18 1151)  Resp: 16 (08/12/18 1151)  BP: 128/79 (08/12/18 1151)  SpO2: 99 % (08/12/18 1151) Vital Signs (24h Range):  Temp:  [96.6 °F (35.9 °C)-97.8 °F (36.6 °C)] 97.1 °F (36.2 °C)  Pulse:  [79-86] 79  Resp:  [16-18] 16  SpO2:  [99 %-100 %] 99 %  BP: (109-137)/(57-85) 128/79     Weight: 52.6 kg (116 lb) (08/08/18 2011)  Body mass index is 19.3 kg/m².    Physical Exam   Constitutional: She is oriented to person, place, and time. She appears well-developed and well-nourished.   Eyes: EOM are normal. Pupils are equal, round, and reactive to light.   Neck: Normal range of motion. Neck supple.   Cardiovascular: Normal rate and regular rhythm.   Pulmonary/Chest: Effort normal and breath sounds normal.   Abdominal: Soft. Bowel sounds are normal. She exhibits no distension. There is no  tenderness.   Musculoskeletal: Normal range of motion.   Neurological: She is alert and oriented to person, place, and time.   Skin: Skin is warm and dry.   Psychiatric: She has a normal mood and affect.       MELD-Na score: 21 at 8/9/2018  4:40 AM  MELD score: 21 at 8/9/2018  4:40 AM  Calculated from:  Serum Creatinine: 0  Serum Sodium: 137 mmol/L at 8/9/2018  4:40 AM  Total Bilirubin: 0.7 mg/dL (Rounded to 1 mg/dL) at 8/9/2018  4:40 AM  INR(ratio): 1.1 at 8/7/2018  8:06 AM  Age: 27 years    Significant Labs:  CBC:   Recent Labs   Lab  08/12/18 0427   WBC  4.51   RBC  2.76*   HGB  7.7*   HCT  23.5*   PLT  107*     BMP:   Recent Labs   Lab  08/12/18 0427   GLU  88   NA  134*   K  4.0   CL  98   CO2  25   BUN  30*   CREATININE  6.9*   CALCIUM  7.5*     CMP:   Recent Labs   Lab  08/12/18 0427   GLU  88   CALCIUM  7.5*   ALBUMIN  2.6*   PROT  6.7   NA  134*   K  4.0   CO2  25   CL  98   BUN  30*   CREATININE  6.9*   ALKPHOS  527*   ALT  13   AST  17   BILITOT  0.5     Coagulation:   Recent Labs   Lab  08/07/18   0806   INR  1.1       Significant Imaging:  Labs: Reviewed

## 2018-08-13 LAB
ALBUMIN SERPL BCP-MCNC: 2.5 G/DL
ALDOST SERPL-MCNC: 6.3 NG/DL
ALDOST/RENIN PLAS-RTO: 4.9 RATIO
ALP SERPL-CCNC: 556 U/L
ALT SERPL W/O P-5'-P-CCNC: 13 U/L
ANION GAP SERPL CALC-SCNC: 11 MMOL/L
AST SERPL-CCNC: 19 U/L
BASOPHILS # BLD AUTO: 0.02 K/UL
BASOPHILS NFR BLD: 0.6 %
BILIRUB SERPL-MCNC: 0.4 MG/DL
BUN SERPL-MCNC: 38 MG/DL
CALCIUM SERPL-MCNC: 8.2 MG/DL
CHLORIDE SERPL-SCNC: 100 MMOL/L
CO2 SERPL-SCNC: 24 MMOL/L
CREAT SERPL-MCNC: 8.5 MG/DL
DIFFERENTIAL METHOD: ABNORMAL
EOSINOPHIL # BLD AUTO: 0.4 K/UL
EOSINOPHIL NFR BLD: 10.2 %
ERYTHROCYTE [DISTWIDTH] IN BLOOD BY AUTOMATED COUNT: 15.2 %
EST. GFR  (AFRICAN AMERICAN): 6.7 ML/MIN/1.73 M^2
EST. GFR  (NON AFRICAN AMERICAN): 5.8 ML/MIN/1.73 M^2
GLUCOSE SERPL-MCNC: 93 MG/DL
HCT VFR BLD AUTO: 23.7 %
HGB BLD-MCNC: 7.7 G/DL
IMM GRANULOCYTES # BLD AUTO: 0.02 K/UL
IMM GRANULOCYTES NFR BLD AUTO: 0.6 %
LYMPHOCYTES # BLD AUTO: 0.8 K/UL
LYMPHOCYTES NFR BLD: 21.5 %
MAGNESIUM SERPL-MCNC: 2.1 MG/DL
MCH RBC QN AUTO: 27.6 PG
MCHC RBC AUTO-ENTMCNC: 32.5 G/DL
MCV RBC AUTO: 85 FL
MONOCYTES # BLD AUTO: 0.3 K/UL
MONOCYTES NFR BLD: 7.9 %
NEUTROPHILS # BLD AUTO: 2.1 K/UL
NEUTROPHILS NFR BLD: 59.2 %
NRBC BLD-RTO: 0 /100 WBC
PHOSPHATE SERPL-MCNC: 5.9 MG/DL
PLATELET # BLD AUTO: 106 K/UL
PMV BLD AUTO: 11.8 FL
POTASSIUM SERPL-SCNC: 4.3 MMOL/L
PROT SERPL-MCNC: 6.7 G/DL
RBC # BLD AUTO: 2.79 M/UL
RENIN PLAS-CCNC: 1.3 NG/ML/HR
SODIUM SERPL-SCNC: 135 MMOL/L
TACROLIMUS BLD-MCNC: 6.4 NG/ML
WBC # BLD AUTO: 3.54 K/UL

## 2018-08-13 PROCEDURE — 90935 HEMODIALYSIS ONE EVALUATION: CPT | Mod: ,,, | Performed by: NURSE PRACTITIONER

## 2018-08-13 PROCEDURE — 63600175 PHARM REV CODE 636 W HCPCS: Performed by: STUDENT IN AN ORGANIZED HEALTH CARE EDUCATION/TRAINING PROGRAM

## 2018-08-13 PROCEDURE — 80053 COMPREHEN METABOLIC PANEL: CPT

## 2018-08-13 PROCEDURE — 83735 ASSAY OF MAGNESIUM: CPT

## 2018-08-13 PROCEDURE — 94761 N-INVAS EAR/PLS OXIMETRY MLT: CPT

## 2018-08-13 PROCEDURE — 36415 COLL VENOUS BLD VENIPUNCTURE: CPT

## 2018-08-13 PROCEDURE — 90935 HEMODIALYSIS ONE EVALUATION: CPT

## 2018-08-13 PROCEDURE — 99233 SBSQ HOSP IP/OBS HIGH 50: CPT | Mod: 25,GC,, | Performed by: OBSTETRICS & GYNECOLOGY

## 2018-08-13 PROCEDURE — 85025 COMPLETE CBC W/AUTO DIFF WBC: CPT

## 2018-08-13 PROCEDURE — 99231 SBSQ HOSP IP/OBS SF/LOW 25: CPT | Mod: ,,, | Performed by: HOSPITALIST

## 2018-08-13 PROCEDURE — 25000003 PHARM REV CODE 250: Performed by: HOSPITALIST

## 2018-08-13 PROCEDURE — 80197 ASSAY OF TACROLIMUS: CPT

## 2018-08-13 PROCEDURE — 25000003 PHARM REV CODE 250: Performed by: INTERNAL MEDICINE

## 2018-08-13 PROCEDURE — 84100 ASSAY OF PHOSPHORUS: CPT

## 2018-08-13 PROCEDURE — 25000003 PHARM REV CODE 250: Performed by: STUDENT IN AN ORGANIZED HEALTH CARE EDUCATION/TRAINING PROGRAM

## 2018-08-13 PROCEDURE — 11000001 HC ACUTE MED/SURG PRIVATE ROOM

## 2018-08-13 RX ADMIN — HYDRALAZINE HYDROCHLORIDE 100 MG: 50 TABLET ORAL at 03:08

## 2018-08-13 RX ADMIN — HYDRALAZINE HYDROCHLORIDE 100 MG: 50 TABLET ORAL at 05:08

## 2018-08-13 RX ADMIN — FAMOTIDINE 20 MG: 20 TABLET ORAL at 12:08

## 2018-08-13 RX ADMIN — TACROLIMUS 7 MG: 5 CAPSULE ORAL at 05:08

## 2018-08-13 RX ADMIN — METRONIDAZOLE 500 MG: 500 TABLET ORAL at 09:08

## 2018-08-13 RX ADMIN — SODIUM CHLORIDE 300 ML: 0.9 INJECTION, SOLUTION INTRAVENOUS at 07:08

## 2018-08-13 RX ADMIN — HYDRALAZINE HYDROCHLORIDE 100 MG: 50 TABLET ORAL at 08:08

## 2018-08-13 RX ADMIN — CITALOPRAM HYDROBROMIDE 20 MG: 20 TABLET ORAL at 12:08

## 2018-08-13 RX ADMIN — LABETALOL HCL 400 MG: 200 TABLET, FILM COATED ORAL at 05:08

## 2018-08-13 RX ADMIN — TACROLIMUS 8 MG: 5 CAPSULE ORAL at 12:08

## 2018-08-13 RX ADMIN — LABETALOL HCL 400 MG: 200 TABLET, FILM COATED ORAL at 09:08

## 2018-08-13 RX ADMIN — METRONIDAZOLE 500 MG: 500 TABLET ORAL at 12:08

## 2018-08-13 RX ADMIN — LISINOPRIL 40 MG: 20 TABLET ORAL at 12:08

## 2018-08-13 RX ADMIN — LABETALOL HCL 400 MG: 200 TABLET, FILM COATED ORAL at 03:08

## 2018-08-13 RX ADMIN — CONJUGATED ESTROGENS 2 G: 0.62 CREAM VAGINAL at 09:08

## 2018-08-13 RX ADMIN — NIFEDIPINE 120 MG: 30 TABLET, FILM COATED, EXTENDED RELEASE ORAL at 12:08

## 2018-08-13 NOTE — PROGRESS NOTES
Progress Note  Gynecology    Admit Date: 2018  LOS: 6    Reason for Admission:  Acute blood loss anemia    SUBJECTIVE:     Holly Patel is a 27 y.o.  with PMHx significant for liver transplant on chronic immunosuppression, poorly controlled HTN, ESRD on HD, and known non-compliance who presents in the ED with continued vaginal bleeding. Ms. Patel is well-known to GYN service. Patient had a LEEP on 6/15/18 and has had intermittent issues with bleeding since then. See initial consult note for full postop course details.    POD#5 s/p EUA/packing. Packing out x3 days now as patient unable to tolerate internal exam on 8/10. Pad counts monitored over the weekend, no bleeding per patient and RN. Reports small amounts of residual brownish discharge from the monsels, wearing a pad only so her clothes do not get stained. No new GYN complaints. Denies purulent vaginal discharge or odor.    OBJECTIVE:     Vital Signs   Temp:  [96.7 °F (35.9 °C)-98 °F (36.7 °C)] 98 °F (36.7 °C)  Pulse:  [72-87] 87  Resp:  [13-20] 14  SpO2:  [98 %-100 %] 100 %  BP: (116-178)/() 137/75    Physical Exam:  Gen: A&Ox3, NAD, resting comfortably  CV: RRR  Pulm: normal respiratory effort  Abd: soft, mildly distended, non-tender to palpation without rebound or guarding  Pelvic: scant yellow-brown discharge on pad from monsels, staining only. Speculum exam attempted however patient unable to tolerate any insertion of the speculum due to discomfort and vaginismus. Vagina remains atrophic, some skin peeling noted externally. No blood noted on today's exam.  Ext: no peripheral edema    Laboratory:  Recent Labs   Lab  18   0526  18   0427  18   0440   WBC  5.19  4.51  3.54*   HGB  8.2*  7.7*  7.7*   HCT  24.9*  23.5*  23.7*   MCV  85  85  85   PLT  103*  107*  106*        Diagnostic Results:  None new for GYN    ASSESSMENT/PLAN:     Active Hospital Problems    Diagnosis  POA    *Acute blood loss anemia [D62]  Yes     Vaginal infection [N76.0]  Yes    Vaginal bleeding [N93.9]  Yes    Thrombocytopenia [D69.6]  Yes    Anemia in ESRD (end-stage renal disease) [N18.6, D63.1]  Yes     Chronic    Renovascular hypertension [I15.0]  Yes     Chronic    ESRD on hemodialysis [N18.6, Z99.2]  Not Applicable     Chronic    Prophylactic immunotherapy [Z29.8]  Not Applicable   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 [Z94.4]  Not Applicable     Chronic     hemangioendothelioma s/p LTx (1992)        Resolved Hospital Problems   No resolved problems to display.       Persistent postop vaginal bleeding after LEEP 6/15/18  - given pattern of severe HTN and bleeding, strongly feel that previous bleeding is due to uncontrolled HTN, likely due to non-compliance with medications and HD  - s/p EUA with packing as above, doing well with no bleeding for past 3 days  - continue flagyl 500 mg BID for vaginal infection on exam to complete 7 days  - continue premarin cream 2g nightly  - unable to examine cervix at bedside today due to patient intolerance  - medical management per primary team, appreciate any help with BP control and compliance strategies  - recommend patient remain in a facility (inpatient vs SNF if qualifies) where BP can be optimally controlled, HD can be received, and bleeding can be monitored. We feel that if BP remains controlled for long enough, likely 2-3 weeks although may be longer with patient's chronic illnesses, that the cervix will be able to heal without further significant bleeding issues. At this point, hysterectomy is not an option as intrabdominal bleeding issues from that would be life-threatening, and she does not have enough remaining cervix to perform any other hemostatic procedures on the tissue. GYN is limited to packing and hemostatic agents for bleeding control and premarin cream to encourage healing as our only options. Therefore keeping BP controlled is imperative to prevention of further bleeding.     Please call  with questions or acute change in bleeding.     Roberta Ruiz MD  OBGYN - PGY 4

## 2018-08-13 NOTE — PROGRESS NOTES
[facility] Hospital Medicine  Progress Note    Patient Name: Holly Patel  MRN: [id number]  Patient Class: IP- Inpatient   Admission Date: 8/7/2018  Length of Stay: 6 days  Attending Physician: Katie Horvath MD  Primary Care Provider: Stan Sosa MD    Hospital Medicine Team: [facility] [place] [facility] MED G Katie Horvath MD    Subjective:     Principal Problem:Acute blood loss anemia    HPI:  The patient is a 26 y/o female with PMH of ESRD on HD MWF, h/o liver transplant on immunosuppression with prograf and prednisone, persistent vaginal bleeding after LEEP procedure in June 2018, poorly controlle HTN, and diastolic HF, and anemia from blood loss. She had had a recent admit for same about a month ago. She underwent LEEP procedure in June 2018 but despite evaluation by GYN she continues to bleeding. Since her last discharge from [facility] [place] she was admitted under gyn service at the end of July and DC summary per Dr. Sims in GYN as per below:    Patient underwent EUA with placement of sturmdorf sutures, and treatment of vagianl atrophy with premarin cream. She did not require transfusion. She was dialyzed x2 during this admission. Her bleeding stopped on POD#3, and she is stable for discharge with close follow up and precautions.  She often requires multiple transfusions and BP control has always been uncontrolled.     She reported that she continued to bleed since then and just could not continue with the bleeding and that is why she is presenting today. She saturates two pads per hour but denies any sob, cp, dizziness or LOC. She reports compliance with fluids and salt and all of her meds.           Hospital Course:  No notes on file    Interval History  No acute issues. HD today.     Review of Systems   Constitutional: Negative for chills, fatigue and fever.   HENT: Negative for congestion and sneezing.    Eyes: Negative for visual disturbance.   Respiratory: Negative for cough and shortness of breath.     Cardiovascular: Negative for chest pain and palpitations.   Gastrointestinal: Negative for abdominal pain and diarrhea.   Genitourinary: Negative for vaginal bleeding.   Musculoskeletal: Negative for arthralgias.   Skin: Negative for rash.   Neurological: Negative for light-headedness and headaches.   All other systems reviewed and are negative.    Objective:     Vital Signs (Most Recent):  Temp: 98 °F (36.7 °C) (08/13/18 1105)  Pulse: 87 (08/13/18 1210)  Resp: 14 (08/13/18 1210)  BP: 137/75 (08/13/18 1105)  SpO2: 100 % (08/13/18 1210) Vital Signs (24h Range):  Temp:  [96.7 °F (35.9 °C)-98 °F (36.7 °C)] 98 °F (36.7 °C)  Pulse:  [72-87] 87  Resp:  [13-20] 14  SpO2:  [98 %-100 %] 100 %  BP: (116-178)/() 137/75     Weight: 52.6 kg (116 lb)  Body mass index is 19.3 kg/m².    Physical Exam   Constitutional: She is oriented to person, place, and time. She appears well-developed and well-nourished. No distress.   HENT:   Head: Normocephalic and atraumatic.   Eyes: EOM are normal.   Neck: Normal range of motion.   Cardiovascular: Normal rate, regular rhythm, normal heart sounds and intact distal pulses.   Pulmonary/Chest: Effort normal and breath sounds normal.   Abdominal: Soft. Bowel sounds are normal. There is no tenderness.   Musculoskeletal: She exhibits no edema.   Neurological: She is alert and oriented to person, place, and time.   Skin: No erythema.   Vitals reviewed.        CRANIAL NERVES     CN III, IV, VI   Extraocular motions are normal.        Significant Labs: All pertinent labs within the past 24 hours have been reviewed.    Significant Imaging: I have reviewed all pertinent imaging results/findings within the past 24 hours.    Assessment/Plan:      * Acute blood loss anemia    Vaginal Bleeding  - has been an ongoing problem due to vaginal bleeding after LEEP procedure  - gyn consulted - performed EUA 8/8 with below findings  1. Malodorous vaginal bleeding/discharge  2. Cervix visualized with clot in  place, no active bleeding. Sutures from prior surgeries remain visible and intact. Insufficient cervix for Endoloop.  3. Monsel's and packing placed (packing with Monsel's superiorly and Premarin throughout)    - H/H stable. Transfuse to keep Hb > 7  - No bleeding today.   - Cont BP control  - Gyn recommending that patient stay in hospital or facility for BP control and close monitoring for bleeding          Vaginal infection    -gyn recommends flagyl          Vaginal bleeding    - transfuse to keep Hb > 7  - appreciate gyn consult           Thrombocytopenia    - platelets > 50K   - no need for transfusion   - continue to monitor           Anemia in ESRD (end-stage renal disease)    - see acute blood loss anemia           Renovascular hypertension    - patient poorly controlled  - continue HD per nephrology  - resume all home meds  - appreciate nephrology assistance for BP control           ESRD on hemodialysis    - on HD MWF  - nephrology following for HD  - continue cinacalcet  - continue renal diet           Prophylactic immunotherapy    - per liver transplant           Liver replaced by transplant    - hepatology consulted for immunosuppression management  - continue home dose prednisone  - hold cellcept  - prograf increased to 8 mg bid  - daily prograf levels             VTE Risk Mitigation (From admission, onward)        Ordered     IP VTE HIGH RISK PATIENT  Once      08/07/18 1528     Place LINDA hose  Until discontinued      08/07/18 1528     Place sequential compression device  Until discontinued      08/07/18 1528     Reason for No Pharmacological VTE Prophylaxis  Once      08/07/18 1528              Katie Horvath MD  Department of Hospital Medicine   [facility]

## 2018-08-13 NOTE — PLAN OF CARE
Problem: Patient Care Overview  Goal: Plan of Care Review  Outcome: Ongoing (interventions implemented as appropriate)  Pt continues with plan of care. VSS No vaginal bleeding stated by Pt. Pt denies pain at this time. Pt ambulated in the oh. Q 2 hour monitoring for pain and safety. Will continue to monitor.

## 2018-08-13 NOTE — PROGRESS NOTES
[facility]ODIAL[facility]IS NOTE     Patient currently on hemodialysis for removal of uremic toxins and volume.     Patient seen and evaluated on hemodialysis, tolerating treatment, see HD flowsheet for vitals and assessments.      Ultrafiltration goal is 2L     Labs have been reviewed and the dialysate bath has been adjusted.     Assessment/Plan:  Seen on dialysis this morning, tolerating well w/o complaints.  Will continue iHD while in-patient  BPs better controlled, received dose of MARIA T last week.  Will monitor H/H trends.  Continue cinacalcet  Phos elevated this morning, place on renal diet with fluid restrictions of 1.5L    JANESSA Mckenzie, FNP-BC  Nephrology  Pager:  [phone]

## 2018-08-13 NOTE — PROGRESS NOTES
Pt arrived to unit via stretcher. Maintenance pt. Pt alert & stable. Accessed LT AVF X215G BH without difficulty. Duration 3.5hrs, Qb 400ml/min, Qd 800ml/min, planned UFG 2.0L, orders reviewed.

## 2018-08-13 NOTE — PROGRESS NOTES
IHD completed, blood returned. Pt  alert & stable Hemostasis 5 minutes, dry sterile dressing with bandaids applied to sites. Duration 3.5hrs, Qb 400ml/min, Qd 800ml/min, UFG 1.9L. Report called. Pt transported to [facility] via stretcher.

## 2018-08-13 NOTE — SUBJECTIVE & OBJECTIVE
Interval History  No acute issues. HD today.     Review of Systems   Constitutional: Negative for chills, fatigue and fever.   HENT: Negative for congestion and sneezing.    Eyes: Negative for visual disturbance.   Respiratory: Negative for cough and shortness of breath.    Cardiovascular: Negative for chest pain and palpitations.   Gastrointestinal: Negative for abdominal pain and diarrhea.   Genitourinary: Negative for vaginal bleeding.   Musculoskeletal: Negative for arthralgias.   Skin: Negative for rash.   Neurological: Negative for light-headedness and headaches.   All other systems reviewed and are negative.    Objective:     Vital Signs (Most Recent):  Temp: 98 °F (36.7 °C) (08/13/18 1105)  Pulse: 87 (08/13/18 1210)  Resp: 14 (08/13/18 1210)  BP: 137/75 (08/13/18 1105)  SpO2: 100 % (08/13/18 1210) Vital Signs (24h Range):  Temp:  [96.7 °F (35.9 °C)-98 °F (36.7 °C)] 98 °F (36.7 °C)  Pulse:  [72-87] 87  Resp:  [13-20] 14  SpO2:  [98 %-100 %] 100 %  BP: (116-178)/() 137/75     Weight: 52.6 kg (116 lb)  Body mass index is 19.3 kg/m².    Physical Exam   Constitutional: She is oriented to person, place, and time. She appears well-developed and well-nourished. No distress.   HENT:   Head: Normocephalic and atraumatic.   Eyes: EOM are normal.   Neck: Normal range of motion.   Cardiovascular: Normal rate, regular rhythm, normal heart sounds and intact distal pulses.   Pulmonary/Chest: Effort normal and breath sounds normal.   Abdominal: Soft. Bowel sounds are normal. There is no tenderness.   Musculoskeletal: She exhibits no edema.   Neurological: She is alert and oriented to person, place, and time.   Skin: No erythema.   Vitals reviewed.        CRANIAL NERVES     CN III, IV, VI   Extraocular motions are normal.        Significant Labs: All pertinent labs within the past 24 hours have been reviewed.    Significant Imaging: I have reviewed all pertinent imaging results/findings within the past 24 hours.

## 2018-08-14 LAB
ALBUMIN SERPL BCP-MCNC: 2.5 G/DL
ALP SERPL-CCNC: 594 U/L
ALT SERPL W/O P-5'-P-CCNC: 16 U/L
ANION GAP SERPL CALC-SCNC: 9 MMOL/L
AST SERPL-CCNC: 25 U/L
BASOPHILS # BLD AUTO: 0.01 K/UL
BASOPHILS NFR BLD: 0.3 %
BILIRUB SERPL-MCNC: 0.4 MG/DL
BUN SERPL-MCNC: 16 MG/DL
CALCIUM SERPL-MCNC: 8.6 MG/DL
CHLORIDE SERPL-SCNC: 103 MMOL/L
CO2 SERPL-SCNC: 26 MMOL/L
CREAT SERPL-MCNC: 4.9 MG/DL
DIFFERENTIAL METHOD: ABNORMAL
EOSINOPHIL # BLD AUTO: 0.4 K/UL
EOSINOPHIL NFR BLD: 11.5 %
ERYTHROCYTE [DISTWIDTH] IN BLOOD BY AUTOMATED COUNT: 15.3 %
EST. GFR  (AFRICAN AMERICAN): 13.1 ML/MIN/1.73 M^2
EST. GFR  (NON AFRICAN AMERICAN): 11.3 ML/MIN/1.73 M^2
GLUCOSE SERPL-MCNC: 91 MG/DL
HCT VFR BLD AUTO: 24.1 %
HGB BLD-MCNC: 7.8 G/DL
IMM GRANULOCYTES # BLD AUTO: 0.01 K/UL
IMM GRANULOCYTES NFR BLD AUTO: 0.3 %
LYMPHOCYTES # BLD AUTO: 0.5 K/UL
LYMPHOCYTES NFR BLD: 14.8 %
MAGNESIUM SERPL-MCNC: 2.1 MG/DL
MCH RBC QN AUTO: 28.1 PG
MCHC RBC AUTO-ENTMCNC: 32.4 G/DL
MCV RBC AUTO: 87 FL
MONOCYTES # BLD AUTO: 0.3 K/UL
MONOCYTES NFR BLD: 8.6 %
NEUTROPHILS # BLD AUTO: 2 K/UL
NEUTROPHILS NFR BLD: 64.5 %
NRBC BLD-RTO: 0 /100 WBC
PHOSPHATE SERPL-MCNC: 4.7 MG/DL
PLATELET # BLD AUTO: 97 K/UL
PMV BLD AUTO: 9.3 FL
POTASSIUM SERPL-SCNC: 4.5 MMOL/L
PROT SERPL-MCNC: 7 G/DL
RBC # BLD AUTO: 2.78 M/UL
SODIUM SERPL-SCNC: 138 MMOL/L
TACROLIMUS BLD-MCNC: 5.1 NG/ML
WBC # BLD AUTO: 3.04 K/UL

## 2018-08-14 PROCEDURE — 63600175 PHARM REV CODE 636 W HCPCS: Performed by: HOSPITALIST

## 2018-08-14 PROCEDURE — 85025 COMPLETE CBC W/AUTO DIFF WBC: CPT

## 2018-08-14 PROCEDURE — 25000003 PHARM REV CODE 250: Performed by: INTERNAL MEDICINE

## 2018-08-14 PROCEDURE — 80053 COMPREHEN METABOLIC PANEL: CPT

## 2018-08-14 PROCEDURE — 83735 ASSAY OF MAGNESIUM: CPT

## 2018-08-14 PROCEDURE — 99232 SBSQ HOSP IP/OBS MODERATE 35: CPT | Mod: ,,, | Performed by: INTERNAL MEDICINE

## 2018-08-14 PROCEDURE — 84100 ASSAY OF PHOSPHORUS: CPT

## 2018-08-14 PROCEDURE — 11000001 HC ACUTE MED/SURG PRIVATE ROOM

## 2018-08-14 PROCEDURE — 63600175 PHARM REV CODE 636 W HCPCS: Performed by: STUDENT IN AN ORGANIZED HEALTH CARE EDUCATION/TRAINING PROGRAM

## 2018-08-14 PROCEDURE — 25000003 PHARM REV CODE 250: Performed by: STUDENT IN AN ORGANIZED HEALTH CARE EDUCATION/TRAINING PROGRAM

## 2018-08-14 PROCEDURE — 80197 ASSAY OF TACROLIMUS: CPT

## 2018-08-14 PROCEDURE — 25000003 PHARM REV CODE 250: Performed by: HOSPITALIST

## 2018-08-14 PROCEDURE — 36415 COLL VENOUS BLD VENIPUNCTURE: CPT

## 2018-08-14 RX ORDER — HYDRALAZINE HYDROCHLORIDE 50 MG/1
100 TABLET, FILM COATED ORAL EVERY 12 HOURS
Status: DISCONTINUED | OUTPATIENT
Start: 2018-08-14 | End: 2018-08-16 | Stop reason: HOSPADM

## 2018-08-14 RX ORDER — IRBESARTAN 150 MG/1
300 TABLET ORAL DAILY
Status: DISCONTINUED | OUTPATIENT
Start: 2018-08-15 | End: 2018-08-16 | Stop reason: HOSPADM

## 2018-08-14 RX ADMIN — LISINOPRIL 40 MG: 20 TABLET ORAL at 08:08

## 2018-08-14 RX ADMIN — PREDNISONE 1 MG: 1 TABLET ORAL at 09:08

## 2018-08-14 RX ADMIN — METRONIDAZOLE 500 MG: 500 TABLET ORAL at 08:08

## 2018-08-14 RX ADMIN — NIFEDIPINE 120 MG: 30 TABLET, FILM COATED, EXTENDED RELEASE ORAL at 08:08

## 2018-08-14 RX ADMIN — TACROLIMUS 7 MG: 5 CAPSULE ORAL at 05:08

## 2018-08-14 RX ADMIN — CITALOPRAM HYDROBROMIDE 20 MG: 20 TABLET ORAL at 08:08

## 2018-08-14 RX ADMIN — HYDRALAZINE HYDROCHLORIDE 100 MG: 50 TABLET ORAL at 12:08

## 2018-08-14 RX ADMIN — LABETALOL HCL 500 MG: 100 TABLET, FILM COATED ORAL at 08:08

## 2018-08-14 RX ADMIN — TACROLIMUS 8 MG: 5 CAPSULE ORAL at 08:08

## 2018-08-14 RX ADMIN — HYDRALAZINE HYDROCHLORIDE 100 MG: 50 TABLET ORAL at 09:08

## 2018-08-14 RX ADMIN — CONJUGATED ESTROGENS 2 G: 0.62 CREAM VAGINAL at 09:08

## 2018-08-14 RX ADMIN — HYDRALAZINE HYDROCHLORIDE 100 MG: 50 TABLET ORAL at 04:08

## 2018-08-14 RX ADMIN — FAMOTIDINE 20 MG: 20 TABLET ORAL at 08:08

## 2018-08-14 RX ADMIN — CINACALCET HYDROCHLORIDE 30 MG: 30 TABLET, COATED ORAL at 09:08

## 2018-08-14 RX ADMIN — LABETALOL HCL 400 MG: 200 TABLET, FILM COATED ORAL at 05:08

## 2018-08-14 NOTE — ASSESSMENT & PLAN NOTE
Poor control as an outpatient, difficulty with medication management and adherence to hemodialysis. Will need intensive coaching in order to discharge to home safely.  - Nephrology consulted, appreciate HD and recs   - Will discuss with Nephrology about medication adjustments in order to avoid highs/lows as possible.   - Home meds:    - clonidine 0.3mg patch Q7 days    - hydralazine 100mg PO Q8hrs    - labetalol 400mg PO Q8hrs    - lisinopril 40mg PO daily    - nifedipine 120mg PO daily

## 2018-08-14 NOTE — PROGRESS NOTES
[facility] Hospital Medicine  Progress Note    Patient Name: Holly Patel  MRN: [id number]  Patient Class: IP- Inpatient   Admission Date: 8/7/2018  Length of Stay: 7 days  Attending Physician: Susan Mendoza MD  Primary Care Provider: Stan Sosa MD    Hospital Medicine Team: [facility] [place] [facility]OSP MED G Susan Mendoza MD    Subjective:     Principal Problem:Acute blood loss anemia    HPI:  The patient is a 26 y/o female with PMH of ESRD on HD MWF, h/o liver transplant on immunosuppression with prograf and prednisone, persistent vaginal bleeding after LEEP procedure in June 2018, poorly controlle HTN, and diastolic HF, and anemia from blood loss. She had had a recent admit for same about a month ago. She underwent LEEP procedure in June 2018 but despite evaluation by GYN she continues to bleeding. Since her last discharge from [facility] she was admitted under gyn service at the end of July and DC summary per Dr. Sims in GYN as per below:    Patient underwent EUA with placement of sturmdorf sutures, and treatment of vagianl atrophy with premarin cream. She did not require transfusion. She was dialyzed x2 during this admission. Her bleeding stopped on POD#3, and she is stable for discharge with close follow up and precautions.  She often requires multiple transfusions and BP control has always been uncontrolled.     She reported that she continued to bleed since then and just could not continue with the bleeding and that is why she is presenting today. She saturates two pads per hour but denies any sob, cp, dizziness or LOC. She reports compliance with fluids and salt and all of her meds.       Interval History: Feels well today. BP control good with current medications. No recurrence of bleeding. Hgb stable.     Review of Systems   Constitutional: Negative for activity change and appetite change.   Respiratory: Negative for cough and shortness of breath.    Cardiovascular: Negative for chest  pain, palpitations and leg swelling.   Gastrointestinal: Negative for abdominal distention, abdominal pain, constipation and diarrhea.   Genitourinary: Negative for dysuria and hematuria.   Musculoskeletal: Negative for back pain and myalgias.     Objective:     Vital Signs (Most Recent):  Temp: 97 °F (36.1 °C) (08/14/18 1105)  Pulse: 84 (08/14/18 1105)  Resp: 16 (08/14/18 1105)  BP: 139/83 (08/14/18 1105)  SpO2: 99 % (08/14/18 1105) Vital Signs (24h Range):  Temp:  [97 °F (36.1 °C)-98 °F (36.7 °C)] 97 °F (36.1 °C)  Pulse:  [84-90] 84  Resp:  [14-18] 16  SpO2:  [99 %-100 %] 99 %  BP: (131-152)/(75-97) 139/83     Weight: 52.6 kg (116 lb)  Body mass index is 19.3 kg/m².    Intake/Output Summary (Last 24 hours) at 8/14/2018 1344  Last data filed at 8/14/2018 0300  Gross per 24 hour   Intake 790 ml   Output 0 ml   Net 790 ml      Physical Exam   Constitutional: She is oriented to person, place, and time. She appears well-developed and well-nourished. No distress.   HENT:   Head: Normocephalic.   Nose: Nose normal.   Mouth/Throat: Oropharynx is clear and moist.   Eyes: Conjunctivae and EOM are normal. No scleral icterus.   Neck: Normal range of motion. Neck supple. No JVD present.   Cardiovascular: Normal rate, regular rhythm, normal heart sounds and intact distal pulses. Exam reveals no gallop and no friction rub.   No murmur heard.  Pulmonary/Chest: Effort normal and breath sounds normal. No respiratory distress. She has no wheezes. She has no rales.   Abdominal: Soft. Bowel sounds are normal. She exhibits no distension and no mass. There is no tenderness. There is no guarding.   Musculoskeletal: Normal range of motion. She exhibits no edema.   Lymphadenopathy:     She has no cervical adenopathy.   Neurological: She is alert and oriented to person, place, and time.   Skin: Skin is warm and dry. Capillary refill takes less than 2 seconds. No rash noted. No pallor.   Nursing note and vitals reviewed.      Significant  "Labs:   BMP:   Recent Labs   Lab  08/14/18   0512   GLU  91   NA  138   K  4.5   CL  103   CO2  26   BUN  16   CREATININE  4.9*   CALCIUM  8.6*   MG  2.1     CBC:   Recent Labs   Lab  08/13/18   0440  08/14/18   0512   WBC  3.54*  3.04*   HGB  7.7*  7.8*   HCT  23.7*  24.1*   PLT  106*  97*        Recent Labs      08/12/18   0427  08/13/18   0440  08/14/18   0512   HGB  7.7*  7.7*  7.8*         Significant Imaging: I have reviewed all pertinent imaging results/findings within the past 24 hours.    Assessment/Plan:      * Acute blood loss anemia    Vaginal Bleeding  Recurrent problem, since LEEP procedure. Now with multiple readmissions. Hgb stable at this time. GYN performed EUA 8/8 ("Cervix visualized with clot in place, no active bleeding. Sutures from prior surgeries remain visible and intact. Insufficient cervix for Endoloop. 3. Monsel's and packing placed (packing with Monsel's superiorly and Premarin throughout)")  - Gynecology consulted  - CBC daily  - Transfuse to keep Hb > 7  - Cont BP control  - Gyn recommending that patient stay in hospital or facility for BP control and close monitoring for bleeding          Vaginal infection    Metronidazole, per GYN for 7 days  - metronidazole (D#1 8/8, 7 day duration)          Vaginal bleeding    Stable at this time.   - transfuse to keep Hb > 7  - appreciate gyn consult           Thrombocytopenia    Platelets > 50K, no need for transfusion at this time.   - CBC daily         Anemia in ESRD (end-stage renal disease)    Chronic, please see documentation for acute blood loss anemia, above.          Renovascular hypertension    Poor control as an outpatient, difficulty with medication management and adherence to hemodialysis. Will need intensive coaching in order to discharge to home safely.  - Nephrology consulted, appreciate HD and recs   - Will discuss with Nephrology about medication adjustments in order to avoid highs/lows as possible.   - Home meds:    - clonidine " 0.3mg patch Q7 days    - hydralazine 100mg PO Q8hrs    - labetalol 400mg PO Q8hrs    - lisinopril 40mg PO daily    - nifedipine 120mg PO daily        ESRD on hemodialysis    On HD MWF  - Nephrology following for HD  - continue cinacalcet  - continue renal diet          Prophylactic immunotherapy    - per liver transplant           Liver replaced by transplant    - Hepatology consulted for immunosuppression management  - continue home dose prednisone  - hold cellcept  - tacrolimus 8 mg bid  - daily tacrolimus levels             VTE Risk Mitigation (From admission, onward)        Ordered     IP VTE HIGH RISK PATIENT  Once      08/07/18 1528     Place LINDA hose  Until discontinued      08/07/18 1528     Place sequential compression device  Until discontinued      08/07/18 1528     Reason for No Pharmacological VTE Prophylaxis  Once      08/07/18 1528        FEN: renal diet, fluid restrition  TLD: PIV  VTE: LINDA, SCD, no chemoprophylaxis 2/2 bleed  CODE: full  Dispo: To home once blood pressure stable, tolerating oral medications, and pending Gynecologic evaluation    Susan Mendoza M.D., M.P.H.  Department of Hospital Medicine  [facility]  (pager) [phone]  (spect) [phone]

## 2018-08-14 NOTE — SUBJECTIVE & OBJECTIVE
Interval History: Feels well today. BP control good with current medications. No recurrence of bleeding. Hgb stable.     Review of Systems   Constitutional: Negative for activity change and appetite change.   Respiratory: Negative for cough and shortness of breath.    Cardiovascular: Negative for chest pain, palpitations and leg swelling.   Gastrointestinal: Negative for abdominal distention, abdominal pain, constipation and diarrhea.   Genitourinary: Negative for dysuria and hematuria.   Musculoskeletal: Negative for back pain and myalgias.     Objective:     Vital Signs (Most Recent):  Temp: 97 °F (36.1 °C) (08/14/18 1105)  Pulse: 84 (08/14/18 1105)  Resp: 16 (08/14/18 1105)  BP: 139/83 (08/14/18 1105)  SpO2: 99 % (08/14/18 1105) Vital Signs (24h Range):  Temp:  [97 °F (36.1 °C)-98 °F (36.7 °C)] 97 °F (36.1 °C)  Pulse:  [84-90] 84  Resp:  [14-18] 16  SpO2:  [99 %-100 %] 99 %  BP: (131-152)/(75-97) 139/83     Weight: 52.6 kg (116 lb)  Body mass index is 19.3 kg/m².    Intake/Output Summary (Last 24 hours) at 8/14/2018 1344  Last data filed at 8/14/2018 0300  Gross per 24 hour   Intake 790 ml   Output 0 ml   Net 790 ml      Physical Exam   Constitutional: She is oriented to person, place, and time. She appears well-developed and well-nourished. No distress.   HENT:   Head: Normocephalic.   Nose: Nose normal.   Mouth/Throat: Oropharynx is clear and moist.   Eyes: Conjunctivae and EOM are normal. No scleral icterus.   Neck: Normal range of motion. Neck supple. No JVD present.   Cardiovascular: Normal rate, regular rhythm, normal heart sounds and intact distal pulses. Exam reveals no gallop and no friction rub.   No murmur heard.  Pulmonary/Chest: Effort normal and breath sounds normal. No respiratory distress. She has no wheezes. She has no rales.   Abdominal: Soft. Bowel sounds are normal. She exhibits no distension and no mass. There is no tenderness. There is no guarding.   Musculoskeletal: Normal range of motion.  She exhibits no edema.   Lymphadenopathy:     She has no cervical adenopathy.   Neurological: She is alert and oriented to person, place, and time.   Skin: Skin is warm and dry. Capillary refill takes less than 2 seconds. No rash noted. No pallor.   Nursing note and vitals reviewed.      Significant Labs:   BMP:   Recent Labs   Lab  08/14/18   0512   GLU  91   NA  138   K  4.5   CL  103   CO2  26   BUN  16   CREATININE  4.9*   CALCIUM  8.6*   MG  2.1     CBC:   Recent Labs   Lab  08/13/18   0440  08/14/18   0512   WBC  3.54*  3.04*   HGB  7.7*  7.8*   HCT  23.7*  24.1*   PLT  106*  97*        Recent Labs      08/12/18   0427  08/13/18   0440  08/14/18   0512   HGB  7.7*  7.7*  7.8*         Significant Imaging: I have reviewed all pertinent imaging results/findings within the past 24 hours.

## 2018-08-14 NOTE — PROGRESS NOTES
Discussed with primary team and requesting suggestions for an easier home blood pressure regimen, as patient is likely missing doses due to the frequent number of pills and administrations she is receiving.  While in-patient, will attempt to modify doses for closer monitoring.    Will change lisinopril to Irbesartan 300 mg po QD (not dialyzable)  Will change hydralazine to 100 mg q 12 hours  Will change labetalol to 500 mg q 12 hours  Continue nifedipine, clonidine patch as currently ordered.    JANESSA Mckenzie, [facility]-BC  Nephrology  Pager:  [phone]

## 2018-08-14 NOTE — ASSESSMENT & PLAN NOTE
- Hepatology consulted for immunosuppression management  - continue home dose prednisone  - hold cellcept  - tacrolimus 8 mg bid  - daily tacrolimus levels

## 2018-08-14 NOTE — ASSESSMENT & PLAN NOTE
"Vaginal Bleeding  Recurrent problem, since LEEP procedure. Now with multiple readmissions. Hgb stable at this time. GYN performed EUA 8/8 ("Cervix visualized with clot in place, no active bleeding. Sutures from prior surgeries remain visible and intact. Insufficient cervix for Endoloop. 3. Monsel's and packing placed (packing with Monsel's superiorly and Premarin throughout)")  - Gynecology consulted  - CBC daily  - Transfuse to keep Hb > 7  - Cont BP control  - Gyn recommending that patient stay in hospital or facility for BP control and close monitoring for bleeding    "

## 2018-08-14 NOTE — HOSPITAL COURSE
"Ms. Patel was admitted to Hospital Medicine for management of acute blood loss anemia and severe hypertension.     Gynecology was consulted for management of her vaginal bleeding. She has had multiple readmissions for bleeding since her LEEP procedure in June. On 8/8, they were able to perform an EUA where they noted "cervix visualized with clot in place, no active bleeding. Sutures from prior surgeries remain visible and intact. Insufficient cervix for Eddoloop. Monsel's and packing placed." She did not require any blood transfusions during this hospitalization. She did not have any recurrence of bleeding since admission. Gynecology suspected that her recurrent bleeding was likely related to her severe hypertension. They were hopeful that the patient would be able to stay in a skilled nursing or SNF environment after discharge for intensive blood pressure monitoring and medication assistance. She did not qualify for skilled nursing, so she was monitored in the hospital for several days while her medications were adjusted for optimal blood pressure control.     Her renovascular hypertension is poorly controlled as an outpatient, likely related to her complicated medication regimen. Nephrology was consulted and were able to perform volume removal with hemodialysis. They helped to simplify her home medications in order to hopefully increase compliance She will discharge to home with several changes, which were discussed with her and her father. Of note, her medications are now clonidine 0.3mg/24hr (no change), hydralazine 100mg PO Q12hrs (change frequency), irbesartan 300mg PO daily (new medicine), STOPPED lisinopril, labetalol 500mg PO Q12hrs (changed dose and frequency), and nifedipine 120mg PO daily (no change).    Hepatology was consulted for management of her immunosuppression. Her cellcept was held. Tacrolimus doses were adjusted based on levels.   "

## 2018-08-15 LAB
ALBUMIN SERPL BCP-MCNC: 2.5 G/DL
ALP SERPL-CCNC: 584 U/L
ALT SERPL W/O P-5'-P-CCNC: 14 U/L
ANION GAP SERPL CALC-SCNC: 9 MMOL/L
AST SERPL-CCNC: 23 U/L
BASOPHILS # BLD AUTO: 0.02 K/UL
BASOPHILS NFR BLD: 0.6 %
BILIRUB SERPL-MCNC: 0.3 MG/DL
BUN SERPL-MCNC: 25 MG/DL
CALCIUM SERPL-MCNC: 8 MG/DL
CHLORIDE SERPL-SCNC: 104 MMOL/L
CO2 SERPL-SCNC: 23 MMOL/L
CREAT SERPL-MCNC: 6.4 MG/DL
DIFFERENTIAL METHOD: ABNORMAL
EOSINOPHIL # BLD AUTO: 0.6 K/UL
EOSINOPHIL NFR BLD: 16.2 %
ERYTHROCYTE [DISTWIDTH] IN BLOOD BY AUTOMATED COUNT: 15.4 %
EST. GFR  (AFRICAN AMERICAN): 9.5 ML/MIN/1.73 M^2
EST. GFR  (NON AFRICAN AMERICAN): 8.2 ML/MIN/1.73 M^2
FERRITIN SERPL-MCNC: 950 NG/ML
GLUCOSE SERPL-MCNC: 97 MG/DL
HCT VFR BLD AUTO: 24.5 %
HGB BLD-MCNC: 7.8 G/DL
IMM GRANULOCYTES # BLD AUTO: 0.01 K/UL
IMM GRANULOCYTES NFR BLD AUTO: 0.3 %
IRON SERPL-MCNC: 68 UG/DL
LYMPHOCYTES # BLD AUTO: 0.6 K/UL
LYMPHOCYTES NFR BLD: 16.8 %
MAGNESIUM SERPL-MCNC: 2.2 MG/DL
MCH RBC QN AUTO: 27.7 PG
MCHC RBC AUTO-ENTMCNC: 31.8 G/DL
MCV RBC AUTO: 87 FL
MONOCYTES # BLD AUTO: 0.2 K/UL
MONOCYTES NFR BLD: 6.8 %
NEUTROPHILS # BLD AUTO: 2.1 K/UL
NEUTROPHILS NFR BLD: 59.3 %
NRBC BLD-RTO: 0 /100 WBC
PHOSPHATE SERPL-MCNC: 4.6 MG/DL
PLATELET # BLD AUTO: 110 K/UL
PMV BLD AUTO: 10.4 FL
POTASSIUM SERPL-SCNC: 5.1 MMOL/L
PROT SERPL-MCNC: 6.8 G/DL
RBC # BLD AUTO: 2.82 M/UL
SATURATED IRON: 29 %
SODIUM SERPL-SCNC: 136 MMOL/L
TACROLIMUS BLD-MCNC: 6.5 NG/ML
TOTAL IRON BINDING CAPACITY: 237 UG/DL
TRANSFERRIN SERPL-MCNC: 160 MG/DL
WBC # BLD AUTO: 3.51 K/UL

## 2018-08-15 PROCEDURE — 83540 ASSAY OF IRON: CPT

## 2018-08-15 PROCEDURE — 36415 COLL VENOUS BLD VENIPUNCTURE: CPT

## 2018-08-15 PROCEDURE — 25000003 PHARM REV CODE 250: Performed by: STUDENT IN AN ORGANIZED HEALTH CARE EDUCATION/TRAINING PROGRAM

## 2018-08-15 PROCEDURE — 85025 COMPLETE CBC W/AUTO DIFF WBC: CPT

## 2018-08-15 PROCEDURE — 11000001 HC ACUTE MED/SURG PRIVATE ROOM

## 2018-08-15 PROCEDURE — 25000003 PHARM REV CODE 250: Performed by: NURSE PRACTITIONER

## 2018-08-15 PROCEDURE — 80197 ASSAY OF TACROLIMUS: CPT

## 2018-08-15 PROCEDURE — 25000003 PHARM REV CODE 250: Performed by: INTERNAL MEDICINE

## 2018-08-15 PROCEDURE — 82728 ASSAY OF FERRITIN: CPT

## 2018-08-15 PROCEDURE — 99232 SBSQ HOSP IP/OBS MODERATE 35: CPT | Mod: ,,, | Performed by: INTERNAL MEDICINE

## 2018-08-15 PROCEDURE — 84100 ASSAY OF PHOSPHORUS: CPT

## 2018-08-15 PROCEDURE — 83735 ASSAY OF MAGNESIUM: CPT

## 2018-08-15 PROCEDURE — 63600175 PHARM REV CODE 636 W HCPCS: Mod: JG | Performed by: NURSE PRACTITIONER

## 2018-08-15 PROCEDURE — 25000003 PHARM REV CODE 250: Performed by: HOSPITALIST

## 2018-08-15 PROCEDURE — 63600175 PHARM REV CODE 636 W HCPCS: Performed by: STUDENT IN AN ORGANIZED HEALTH CARE EDUCATION/TRAINING PROGRAM

## 2018-08-15 PROCEDURE — 90935 HEMODIALYSIS ONE EVALUATION: CPT

## 2018-08-15 PROCEDURE — 96372 THER/PROPH/DIAG INJ SC/IM: CPT

## 2018-08-15 PROCEDURE — 90935 HEMODIALYSIS ONE EVALUATION: CPT | Mod: ,,, | Performed by: INTERNAL MEDICINE

## 2018-08-15 PROCEDURE — 80053 COMPREHEN METABOLIC PANEL: CPT

## 2018-08-15 RX ORDER — SODIUM CHLORIDE 9 MG/ML
INJECTION, SOLUTION INTRAVENOUS ONCE
Status: COMPLETED | OUTPATIENT
Start: 2018-08-15 | End: 2018-08-15

## 2018-08-15 RX ADMIN — LABETALOL HCL 500 MG: 100 TABLET, FILM COATED ORAL at 12:08

## 2018-08-15 RX ADMIN — IRBESARTAN 300 MG: 150 TABLET ORAL at 12:08

## 2018-08-15 RX ADMIN — LABETALOL HCL 500 MG: 100 TABLET, FILM COATED ORAL at 10:08

## 2018-08-15 RX ADMIN — SODIUM CHLORIDE 350 ML: 0.9 INJECTION, SOLUTION INTRAVENOUS at 11:08

## 2018-08-15 RX ADMIN — TACROLIMUS 8 MG: 5 CAPSULE ORAL at 12:08

## 2018-08-15 RX ADMIN — CONJUGATED ESTROGENS 2 G: 0.62 CREAM VAGINAL at 10:08

## 2018-08-15 RX ADMIN — NIFEDIPINE 120 MG: 30 TABLET, FILM COATED, EXTENDED RELEASE ORAL at 12:08

## 2018-08-15 RX ADMIN — METRONIDAZOLE 500 MG: 500 TABLET ORAL at 12:08

## 2018-08-15 RX ADMIN — HYDRALAZINE HYDROCHLORIDE 100 MG: 50 TABLET ORAL at 12:08

## 2018-08-15 RX ADMIN — TACROLIMUS 7 MG: 5 CAPSULE ORAL at 05:08

## 2018-08-15 RX ADMIN — ERYTHROPOIETIN 5300 UNITS: 10000 INJECTION, SOLUTION INTRAVENOUS; SUBCUTANEOUS at 11:08

## 2018-08-15 RX ADMIN — FAMOTIDINE 20 MG: 20 TABLET ORAL at 12:08

## 2018-08-15 RX ADMIN — CITALOPRAM HYDROBROMIDE 20 MG: 20 TABLET ORAL at 12:08

## 2018-08-15 RX ADMIN — CLONIDINE 1 PATCH: 0.3 PATCH TRANSDERMAL at 12:08

## 2018-08-15 RX ADMIN — HYDRALAZINE HYDROCHLORIDE 100 MG: 50 TABLET ORAL at 10:08

## 2018-08-15 NOTE — SUBJECTIVE & OBJECTIVE
Interval History: Transitioned to BID dosing for most antihypertensives yesterday, with the help of Nephrology. Overnight, SBP 140s. No recurrence of bleeding. Asking to leave the hospital.      Review of Systems   Constitutional: Negative for activity change and appetite change.   Respiratory: Negative for cough and shortness of breath.    Cardiovascular: Negative for chest pain, palpitations and leg swelling.   Gastrointestinal: Negative for abdominal distention, abdominal pain, constipation and diarrhea.   Genitourinary: Negative for dysuria and hematuria.   Musculoskeletal: Negative for back pain and myalgias.     Objective:     Vital Signs (Most Recent):  Temp: 96.8 °F (36 °C) (08/15/18 0735)  Pulse: 85 (08/15/18 0735)  Resp: 18 (08/15/18 0735)  BP: (!) 147/94 (08/15/18 0735)  SpO2: 99 % (08/15/18 0735) Vital Signs (24h Range):  Temp:  [96.4 °F (35.8 °C)-97.3 °F (36.3 °C)] 96.8 °F (36 °C)  Pulse:  [83-89] 85  Resp:  [16-18] 18  SpO2:  [99 %-100 %] 99 %  BP: (119-147)/(68-95) 147/94     Weight: 52.6 kg (116 lb)  Body mass index is 19.3 kg/m².  No intake or output data in the 24 hours ending 08/15/18 0753   Physical Exam   Constitutional: She is oriented to person, place, and time. She appears well-developed and well-nourished. No distress.   HENT:   Head: Normocephalic.   Nose: Nose normal.   Mouth/Throat: Oropharynx is clear and moist.   Eyes: Conjunctivae and EOM are normal. No scleral icterus.   Neck: Normal range of motion. Neck supple. No JVD present.   Cardiovascular: Normal rate, regular rhythm, normal heart sounds and intact distal pulses. Exam reveals no gallop and no friction rub.   No murmur heard.  Pulmonary/Chest: Effort normal and breath sounds normal. No respiratory distress. She has no wheezes. She has no rales.   Abdominal: Soft. Bowel sounds are normal. She exhibits no distension and no mass. There is no tenderness. There is no guarding.   Musculoskeletal: Normal range of motion. She exhibits  no edema.   Lymphadenopathy:     She has no cervical adenopathy.   Neurological: She is alert and oriented to person, place, and time.   Skin: Skin is warm and dry. Capillary refill takes less than 2 seconds. No rash noted. No pallor.   Nursing note and vitals reviewed.    Meds reviewed, notable changes:  - clonidine 0.3mg/24hr *no change  - hydralazine 100mg PO Q12hrs  - irbesartan 300mg PO daily  - STOPPED lisinopril  - labetalol 500mg PO Q12hrs  - nifedipine 120mg PO daily * no change      Significant Labs:   BMP:   Recent Labs   Lab  08/15/18   0458   GLU  97   NA  136   K  5.1   CL  104   CO2  23   BUN  25*   CREATININE  6.4*   CALCIUM  8.0*   MG  2.2     CBC:   Recent Labs   Lab  08/14/18   0512  08/15/18   0458   WBC  3.04*  3.51*   HGB  7.8*  7.8*   HCT  24.1*  24.5*   PLT  97*  110*       Significant Imaging: I have reviewed all pertinent imaging results/findings within the past 24 hours.

## 2018-08-15 NOTE — PROGRESS NOTES
[facility] Hospital Medicine  Progress Note    Patient Name: Holly Patel  MRN: [id number]  Patient Class: IP- Inpatient   Admission Date: 8/7/2018  Length of Stay: 8 days  Attending Physician: Susan Mendoza MD  Primary Care Provider: Stan Sosa MD    Hospital Medicine Team: [facility] Susan Mendoza MD    Subjective:     Principal Problem:Acute blood loss anemia    HPI:  The patient is a 28 y/o female with PMH of ESRD on HD MWF, h/o liver transplant on immunosuppression with prograf and prednisone, persistent vaginal bleeding after LEEP procedure in June 2018, poorly controlle HTN, and diastolic HF, and anemia from blood loss. She had had a recent admit for same about a month ago. She underwent LEEP procedure in June 2018 but despite evaluation by GYN she continues to bleeding. [place] her [place] [facility] [place] was admitted under gyn service at the end of July and DC summary per Dr. Sims in GYN as per below:    Patient underwent EUA with placement of sturmdorf sutures, and treatment of vagianl atrophy with premarin cream. She did not require transfusion. She was dialyzed x2 during this admission. Her bleeding stopped on POD#3, and she is stable for discharge with close follow up and precautions.  She often requires multiple transfusions and BP control has always been uncontrolled.     She reported that she continued to bleed since then and just could not continue with the bleeding and that is why she is presenting today. She saturates two pads per hour but denies any sob, cp, dizziness or LOC. She reports compliance with fluids and salt and all of her meds.       Interval History: Transitioned to BID dosing for most antihypertensives yesterday, with the help of Nephrology. Overnight, SBP 140s. No recurrence of bleeding.     Review of Systems   Constitutional: Negative for activity change and appetite change.   Respiratory: Negative for cough and shortness of breath.     Cardiovascular: Negative for chest pain, palpitations and leg swelling.   Gastrointestinal: Negative for abdominal distention, abdominal pain, constipation and diarrhea.   Genitourinary: Negative for dysuria and hematuria.   Musculoskeletal: Negative for back pain and myalgias.     Objective:     Vital Signs (Most Recent):  Temp: 96.8 °F (36 °C) (08/15/18 0735)  Pulse: 85 (08/15/18 0735)  Resp: 18 (08/15/18 0735)  BP: (!) 147/94 (08/15/18 0735)  SpO2: 99 % (08/15/18 0735) Vital Signs (24h Range):  Temp:  [96.4 °F (35.8 °C)-97.3 °F (36.3 °C)] 96.8 °F (36 °C)  Pulse:  [83-89] 85  Resp:  [16-18] 18  SpO2:  [99 %-100 %] 99 %  BP: (119-147)/(68-95) 147/94     Weight: 52.6 kg (116 lb)  Body mass index is 19.3 kg/m².  No intake or output data in the 24 hours ending 08/15/18 0753   Physical Exam   Constitutional: She is oriented to person, place, and time. She appears well-developed and well-nourished. No distress.   HENT:   Head: Normocephalic.   Nose: Nose normal.   Mouth/Throat: Oropharynx is clear and moist.   Eyes: Conjunctivae and EOM are normal. No scleral icterus.   Neck: Normal range of motion. Neck supple. No JVD present.   Cardiovascular: Normal rate, regular rhythm, normal heart sounds and intact distal pulses. Exam reveals no gallop and no friction rub.   No murmur heard.  Pulmonary/Chest: Effort normal and breath sounds normal. No respiratory distress. She has no wheezes. She has no rales.   Abdominal: Soft. Bowel sounds are normal. She exhibits no distension and no mass. There is no tenderness. There is no guarding.   Musculoskeletal: Normal range of motion. She exhibits no edema.   Lymphadenopathy:     She has no cervical adenopathy.   Neurological: She is alert and oriented to person, place, and time.   Skin: Skin is warm and dry. Capillary refill takes less than 2 seconds. No rash noted. No pallor.   Nursing note and vitals reviewed.    Meds reviewed, notable changes:  - clonidine 0.3mg/24hr *no  "change  - hydralazine 100mg PO Q12hrs  - irbesartan 300mg PO daily  - STOPPED lisinopril  - labetalol 500mg PO Q12hrs  - nifedipine 120mg PO daily * no change      Significant Labs:   BMP:   Recent Labs   Lab  08/15/18   0458   GLU  97   NA  136   K  5.1   CL  104   CO2  23   BUN  25*   CREATININE  6.4*   CALCIUM  8.0*   MG  2.2     CBC:   Recent Labs   Lab  08/14/18   0512  08/15/18   0458   WBC  3.04*  3.51*   HGB  7.8*  7.8*   HCT  24.1*  24.5*   PLT  97*  110*       Significant Imaging: I have reviewed all pertinent imaging results/findings within the past 24 hours.    Assessment/Plan:      * Acute blood loss anemia    Vaginal Bleeding  Recurrent problem, since LEEP procedure. Now with multiple readmissions. Hgb stable at this time. GYN performed EUA 8/8 ("Cervix visualized with clot in place, no active bleeding. Sutures from prior surgeries remain visible and intact. Insufficient cervix for Endoloop. 3. Monsel's and packing placed (packing with Monsel's superiorly and Premarin throughout)")  - Gynecology consulted  - CBC daily  - Transfuse to keep Hb > 7  - Cont BP control  - Gyn recommending that patient stay in hospital or facility for BP control and close monitoring for bleeding          Renovascular hypertension    Poor control as an outpatient, difficulty with medication management and adherence to hemodialysis. Will need intensive coaching in order to discharge to home safely.  - Nephrology consulted, appreciate HD and recs   - Will discuss with Nephrology about medication adjustments in order to avoid highs/lows as possible.   - Home meds:    - clonidine 0.3mg patch Q7 days    - hydralazine 100mg PO Q8hrs    - labetalol 400mg PO Q8hrs    - lisinopril 40mg PO daily    - nifedipine 120mg PO daily        Vaginal infection    Metronidazole, per GYN for 7 days  - metronidazole (D#1 8/8, 7 day duration)          Thrombocytopenia    Platelets > 50K, no need for transfusion at this time.   - CBC daily       "   Anemia in ESRD (end-stage renal disease)    Chronic, please see documentation for acute blood loss anemia, above.          ESRD on hemodialysis    On HD MWF  - Nephrology following for HD  - continue cinacalcet  - continue renal diet          Prophylactic immunotherapy    - per liver transplant           Liver replaced by transplant    - Hepatology consulted for immunosuppression management  - continue home dose prednisone  - hold cellcept  - tacrolimus 8 mg bid  - daily tacrolimus levels             VTE Risk Mitigation (From admission, onward)        Ordered     IP VTE HIGH RISK PATIENT  Once      08/07/18 1528     Place LINDA hose  Until discontinued      08/07/18 1528     Place sequential compression device  Until discontinued      08/07/18 1528     Reason for No Pharmacological VTE Prophylaxis  Once      08/07/18 1528          Susan Mendoza M.D., M.P.H.  Department of Hospital Medicine  [facility]  (pager) [phone] (spect) [phone]

## 2018-08-15 NOTE — ASSESSMENT & PLAN NOTE
Poor control as an outpatient, difficulty with medication management and adherence to hemodialysis. Will need intensive coaching in order to discharge to home safely. Have asked Nephrology for recommendations regarding med simplification.   - Nephrology consulted, appreciate HD and recs  - Home meds:    - clonidine 0.3mg patch Q7 days    - hydralazine 100mg PO BID    - labetalol 500mg PO BID    - nifedipine 120mg PO daily    - irbesartan 300mg PO daily

## 2018-08-15 NOTE — PLAN OF CARE
Problem: Patient Care Overview  Goal: Plan of Care Review  Outcome: Ongoing (interventions implemented as appropriate)  Pt AAOx4. VSS. Closely monitoring bp. Denied pain throughout night. No reports of vaginal bleeding noted. No falls noted. Q2 hourly rounding maintained. Safety precautions maintained - bed in low position, call light in reach, side rails up x2.

## 2018-08-15 NOTE — ASSESSMENT & PLAN NOTE
"Vaginal Bleeding  Recurrent problem, since LEEP procedure. Now with multiple readmissions. Hgb. GYN performed EUA 8/8 ("Cervix visualized with clot in place, no active bleeding. Sutures from prior surgeries remain visible and intact. Insufficient cervix for Endoloop. 3. Monsel's and packing placed (packing with Monsel's superiorly and Premarin throughout)")  - Gynecology consulted  - CBC daily  - Transfuse to keep Hb > 7  - Cont BP control  - Gyn recommending that patient stay in hospital or facility for BP control and close monitoring for bleeding    "

## 2018-08-15 NOTE — PROGRESS NOTES
Report received from EDMOND Velásquez. Pt arrived from floor AAOx4. Maintenance dialysis initiated via PAL fistula with buttonhole needles.

## 2018-08-15 NOTE — NURSING TRANSFER
Nursing Transfer Note      8/15/2018     Transfer to dialysis    Transfer via stretcher    Transported by escort    Medicines sent: none    Chart send with patient: yes

## 2018-08-15 NOTE — PLAN OF CARE
Problem: Patient Care Overview  Goal: Plan of Care Review  Outcome: Ongoing (interventions implemented as appropriate)  Pt is AAOx4. No falls/injury as pt calls for assistance when needed. Pt able to ambulate independently. No s/s of skin breakdown as pt is independent with re-positioning self. No c/o pain. Antibiotic given. Pt reports no bleeding today. Pt received HD today. Bed in lowest position. Call light within reach. Will continue to monitor.

## 2018-08-15 NOTE — PROGRESS NOTES
I personally saw and examined the patient on hemodialysis, tolerating treatment, see hemodyalisis flowsheet. I also reviewed the chart and current medication. The dialysis bath was adjusted.   Continue epo dosing. Target UF 2 1/2 liter (challenge today to control BP)  BP better controlled. Ok to be discharged from renal standpoint.

## 2018-08-16 VITALS
OXYGEN SATURATION: 99 % | HEIGHT: 65 IN | TEMPERATURE: 98 F | WEIGHT: 116 LBS | RESPIRATION RATE: 18 BRPM | SYSTOLIC BLOOD PRESSURE: 127 MMHG | HEART RATE: 88 BPM | BODY MASS INDEX: 19.33 KG/M2 | DIASTOLIC BLOOD PRESSURE: 81 MMHG

## 2018-08-16 LAB
ALBUMIN SERPL BCP-MCNC: 2.6 G/DL
ALP SERPL-CCNC: 635 U/L
ALT SERPL W/O P-5'-P-CCNC: 20 U/L
ANION GAP SERPL CALC-SCNC: 9 MMOL/L
AST SERPL-CCNC: 34 U/L
BASOPHILS # BLD AUTO: 0.01 K/UL
BASOPHILS NFR BLD: 0.3 %
BILIRUB SERPL-MCNC: 0.5 MG/DL
BUN SERPL-MCNC: 15 MG/DL
CALCIUM SERPL-MCNC: 9.1 MG/DL
CHLORIDE SERPL-SCNC: 102 MMOL/L
CO2 SERPL-SCNC: 25 MMOL/L
CREAT SERPL-MCNC: 4.5 MG/DL
DIFFERENTIAL METHOD: ABNORMAL
EOSINOPHIL # BLD AUTO: 0.6 K/UL
EOSINOPHIL NFR BLD: 15.8 %
ERYTHROCYTE [DISTWIDTH] IN BLOOD BY AUTOMATED COUNT: 15.5 %
EST. GFR  (AFRICAN AMERICAN): 14.5 ML/MIN/1.73 M^2
EST. GFR  (NON AFRICAN AMERICAN): 12.6 ML/MIN/1.73 M^2
GLUCOSE SERPL-MCNC: 95 MG/DL
HAV IGM SERPL QL IA: NEGATIVE
HBV CORE IGM SERPL QL IA: NEGATIVE
HBV SURFACE AB SER-ACNC: POSITIVE M[IU]/ML
HBV SURFACE AG SERPL QL IA: NEGATIVE
HBV SURFACE AG SERPL QL IA: NEGATIVE
HCT VFR BLD AUTO: 24.2 %
HCV AB SERPL QL IA: NEGATIVE
HGB BLD-MCNC: 7.9 G/DL
IMM GRANULOCYTES # BLD AUTO: 0.01 K/UL
IMM GRANULOCYTES NFR BLD AUTO: 0.3 %
LYMPHOCYTES # BLD AUTO: 0.6 K/UL
LYMPHOCYTES NFR BLD: 16.9 %
MAGNESIUM SERPL-MCNC: 2.2 MG/DL
MCH RBC QN AUTO: 28.3 PG
MCHC RBC AUTO-ENTMCNC: 32.6 G/DL
MCV RBC AUTO: 87 FL
MONOCYTES # BLD AUTO: 0.2 K/UL
MONOCYTES NFR BLD: 6.4 %
NEUTROPHILS # BLD AUTO: 2.2 K/UL
NEUTROPHILS NFR BLD: 60.3 %
NRBC BLD-RTO: 0 /100 WBC
PHOSPHATE SERPL-MCNC: 4.2 MG/DL
PLATELET # BLD AUTO: 104 K/UL
PMV BLD AUTO: 9.9 FL
POTASSIUM SERPL-SCNC: 5 MMOL/L
PROT SERPL-MCNC: 7.3 G/DL
RBC # BLD AUTO: 2.79 M/UL
SODIUM SERPL-SCNC: 136 MMOL/L
TACROLIMUS BLD-MCNC: 5.3 NG/ML
WBC # BLD AUTO: 3.6 K/UL

## 2018-08-16 PROCEDURE — 25000003 PHARM REV CODE 250: Performed by: INTERNAL MEDICINE

## 2018-08-16 PROCEDURE — 63600175 PHARM REV CODE 636 W HCPCS: Performed by: HOSPITALIST

## 2018-08-16 PROCEDURE — 86706 HEP B SURFACE ANTIBODY: CPT

## 2018-08-16 PROCEDURE — 85025 COMPLETE CBC W/AUTO DIFF WBC: CPT

## 2018-08-16 PROCEDURE — 25000003 PHARM REV CODE 250: Performed by: NURSE PRACTITIONER

## 2018-08-16 PROCEDURE — 25000003 PHARM REV CODE 250: Performed by: HOSPITALIST

## 2018-08-16 PROCEDURE — 36415 COLL VENOUS BLD VENIPUNCTURE: CPT

## 2018-08-16 PROCEDURE — 99239 HOSP IP/OBS DSCHRG MGMT >30: CPT | Mod: ,,, | Performed by: INTERNAL MEDICINE

## 2018-08-16 PROCEDURE — 63600175 PHARM REV CODE 636 W HCPCS: Performed by: STUDENT IN AN ORGANIZED HEALTH CARE EDUCATION/TRAINING PROGRAM

## 2018-08-16 PROCEDURE — 80197 ASSAY OF TACROLIMUS: CPT

## 2018-08-16 PROCEDURE — 80053 COMPREHEN METABOLIC PANEL: CPT

## 2018-08-16 PROCEDURE — 84100 ASSAY OF PHOSPHORUS: CPT

## 2018-08-16 PROCEDURE — 83735 ASSAY OF MAGNESIUM: CPT

## 2018-08-16 PROCEDURE — 80074 ACUTE HEPATITIS PANEL: CPT

## 2018-08-16 RX ORDER — HYDRALAZINE HYDROCHLORIDE 100 MG/1
100 TABLET, FILM COATED ORAL EVERY 12 HOURS
Qty: 60 TABLET | Refills: 11 | Status: ON HOLD | OUTPATIENT
Start: 2018-08-16 | End: 2018-01-01 | Stop reason: SDUPTHER

## 2018-08-16 RX ORDER — IRBESARTAN 300 MG/1
300 TABLET ORAL DAILY
Qty: 90 TABLET | Refills: 3 | Status: ON HOLD | OUTPATIENT
Start: 2018-08-16 | End: 2018-01-01 | Stop reason: SDUPTHER

## 2018-08-16 RX ORDER — LABETALOL 100 MG/1
500 TABLET, FILM COATED ORAL EVERY 12 HOURS
Qty: 300 TABLET | Refills: 11 | Status: ON HOLD | OUTPATIENT
Start: 2018-08-16 | End: 2018-01-01 | Stop reason: HOSPADM

## 2018-08-16 RX ORDER — TACROLIMUS 1 MG/1
CAPSULE ORAL
Qty: 450 CAPSULE | Refills: 0 | Status: ON HOLD | OUTPATIENT
Start: 2018-08-16 | End: 2018-09-04 | Stop reason: SDUPTHER

## 2018-08-16 RX ADMIN — IRBESARTAN 300 MG: 150 TABLET ORAL at 07:08

## 2018-08-16 RX ADMIN — NIFEDIPINE 120 MG: 30 TABLET, FILM COATED, EXTENDED RELEASE ORAL at 07:08

## 2018-08-16 RX ADMIN — PREDNISONE 1 MG: 1 TABLET ORAL at 08:08

## 2018-08-16 RX ADMIN — LABETALOL HCL 500 MG: 100 TABLET, FILM COATED ORAL at 07:08

## 2018-08-16 RX ADMIN — TACROLIMUS 7 MG: 5 CAPSULE ORAL at 04:08

## 2018-08-16 RX ADMIN — ACETAMINOPHEN 650 MG: 325 TABLET, FILM COATED ORAL at 04:08

## 2018-08-16 RX ADMIN — HYDRALAZINE HYDROCHLORIDE 100 MG: 50 TABLET ORAL at 08:08

## 2018-08-16 RX ADMIN — CINACALCET HYDROCHLORIDE 30 MG: 30 TABLET, COATED ORAL at 08:08

## 2018-08-16 RX ADMIN — FAMOTIDINE 20 MG: 20 TABLET ORAL at 07:08

## 2018-08-16 RX ADMIN — HYDRALAZINE HYDROCHLORIDE 100 MG: 50 TABLET ORAL at 04:08

## 2018-08-16 RX ADMIN — LABETALOL HCL 500 MG: 100 TABLET, FILM COATED ORAL at 04:08

## 2018-08-16 RX ADMIN — TACROLIMUS 8 MG: 5 CAPSULE ORAL at 08:08

## 2018-08-16 RX ADMIN — CITALOPRAM HYDROBROMIDE 20 MG: 20 TABLET ORAL at 08:08

## 2018-08-16 NOTE — TREATMENT PLAN
Patient is currently in the hospital for management post vaginal bleeding. Her HTN and CKD are being managed by primary team.    Tacrolimus level today 5.3    No changes on current regimen. Keep patient on tacrolimus 8mg in the AM, and 7mg in the PM. Continue to hold cellcept and continue prednisone 1mg every other day.    Please contact hepatology with further questions.

## 2018-08-17 NOTE — PLAN OF CARE
Plan is to discharge home with family.    Future Appointments   Date Time Provider Department Center   8/20/2018  8:30 AM LAB, [facility]   8/23/2018  9:40 AM Stan Sosa MD [facility] IM [facility]   9/4/2018  9:30 AM Chi Pathak MD [facility] GASTRO [facility]        08/17/18 0721   Final Note   Assessment Type Final Discharge Note   Discharge Disposition Home   Hospital Follow Up  Appt(s) scheduled? Yes   Right Care Referral Info   Post Acute Recommendation No Care

## 2018-08-19 NOTE — DISCHARGE SUMMARY
[facility] Hospital Medicine  Discharge Summary      Patient Name: Holly Patel  MRN: [id number]  Admission Date: 8/7/2018  Hospital Length of Stay: 9 days  Discharge Date and Time: 8/16/2018  5:18 PM  Attending Physician: No att. providers found   Discharging Provider: Susan Mendoza MD  Primary Care Provider: Stan Sosa MD  Hospital Medicine Team: [facility] MED G Susan Mendoza MD    HPI:   The patient is a 28 y/o female with PMH of ESRD on HD MWF, h/o liver transplant on immunosuppression with prograf and prednisone, persistent vaginal bleeding after LEEP procedure in June 2018, poorly controlle HTN, and diastolic HF, and anemia from blood loss. She had had a recent admit for same about a month ago. She underwent LEEP procedure in June 2018 but despite evaluation by GYN she continues to bleeding. Since her last discharge from [facility] she was admitted under gyn service at the end of July and DC summary per Dr. Sims in GYN as per below:    Patient underwent EUA with placement of sturmdorf sutures, and treatment of vagianl atrophy with premarin cream. She did not require transfusion. She was dialyzed x2 during this admission. Her bleeding stopped on POD#3, and she is stable for discharge with close follow up and precautions.  She often requires multiple transfusions and BP control has always been uncontrolled.     She reported that she continued to bleed since then and just could not continue with the bleeding and that is why she is presenting today. She saturates two pads per hour but denies any sob, cp, dizziness or LOC. She reports compliance with fluids and salt and all of her meds.     Procedure(s) (LRB):  Exam Under Anesthesia (N/A)      Hospital Course:   Ms. Patel was admitted to Hospital Medicine for management of acute blood loss anemia and severe hypertension.     Gynecology was consulted for management of her vaginal bleeding. She has had multiple readmissions for  "bleeding since her LEEP procedure in June. On 8/8, they were able to perform an EUA where they noted "cervix visualized with clot in place, no active bleeding. Sutures from prior surgeries remain visible and intact. Insufficient cervix for Eddoloop. Monsel's and packing placed." She did not require any blood transfusions during this hospitalization. She did not have any recurrence of bleeding since admission. Gynecology suspected that her recurrent bleeding was likely related to her severe hypertension. They were hopeful that the patient would be able to stay in a skilled nursing or SNF environment after discharge for intensive blood pressure monitoring and medication assistance. She did not qualify for skilled nursing, so she was monitored in the hospital for several days while her medications were adjusted for optimal blood pressure control.     Her renovascular hypertension is poorly controlled as an outpatient, likely related to her complicated medication regimen. Nephrology was consulted and were able to perform volume removal with hemodialysis. They helped to simplify her home medications in order to hopefully increase compliance She will discharge to home with several changes, which were discussed with her and her father. Of note, her medications are now clonidine 0.3mg/24hr (no change), hydralazine 100mg PO Q12hrs (change frequency), irbesartan 300mg PO daily (new medicine), STOPPED lisinopril, labetalol 500mg PO Q12hrs (changed dose and frequency), and nifedipine 120mg PO daily (no change).    Hepatology was consulted for management of her immunosuppression. Her cellcept was held. Tacrolimus doses were adjusted based on levels.      Consults:   Consults (From admission, onward)        Status Ordering Provider     Inpatient consult to Hepatology  Once     Provider:  (Not yet assigned)    Completed YULISA GILLIAM     Inpatient consult to Nephrology  Once     Provider:  (Not yet assigned)    Completed " LUIS M AGARWAL     Inpatient consult to Obstetrics / Gynecology  Once     Provider:  (Not yet assigned)    Completed LUIS M AGARWAL          No new Assessment & Plan notes have been filed under this hospital service since the last note was generated.  Service: Hospital Medicine    Final Active Diagnoses:    Diagnosis Date Noted POA    PRINCIPAL PROBLEM:  Acute blood loss anemia [D62] 10/12/2015 Yes    Renovascular hypertension [I15.0] 10/02/2015 Yes     Chronic    Vaginal infection [N76.0] 08/08/2018 Yes    Postoperative vaginal bleeding following genitourinary procedure [N99.820] 07/09/2018 Yes    Thrombocytopenia [D69.6] 05/16/2018 Yes    Secondary hyperparathyroidism [N25.81] 08/05/2017 Yes    Anemia in ESRD (end-stage renal disease) [N18.6, D63.1] 10/12/2015 Yes     Chronic    ESRD on hemodialysis [N18.6, Z99.2] 09/30/2015 Not Applicable     Chronic    Prophylactic immunotherapy [Z29.8] 08/04/2014 Not Applicable   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 [Z94.4] 09/10/2012 Not Applicable     Chronic      Problems Resolved During this Admission:       Discharged Condition: good    Disposition: Home or Self Care    Follow Up:    Patient Instructions:      Diet renal     Notify your health care provider if you experience any of the following:   Order Comments: bleeding     Activity as tolerated       Significant Diagnostic Studies: Labs:   BMP  Lab Results   Component Value Date     08/16/2018    K 5.0 08/16/2018     08/16/2018    CO2 25 08/16/2018    BUN 15 08/16/2018    CREATININE 4.5 (H) 08/16/2018    CALCIUM 9.1 08/16/2018    ANIONGAP 9 08/16/2018    ESTGFRAFRICA 14.5 (A) 08/16/2018    EGFRNONAA 12.6 (A) 08/16/2018     Lab Results   Component Value Date    WBC 3.60 (L) 08/16/2018    HGB 7.9 (L) 08/16/2018    HCT 24.2 (L) 08/16/2018    MCV 87 08/16/2018     (L) 08/16/2018       Lab Results   Component Value Date    INR 1.1 08/07/2018    INR 1.0 07/26/2018    INR 1.1 07/20/2018        Pending Diagnostic Studies:     None         Medications:  Reconciled Home Medications:      Medication List      START taking these medications    irbesartan 300 MG tablet  Commonly known as:  AVAPRO  Take 1 tablet (300 mg total) by mouth once daily.        CHANGE how you take these medications    cinacalcet 30 MG Tab  Commonly known as:  SENSIPAR  Take 1 tablet (30 mg total) by mouth daily with breakfast.  What changed:    · when to take this  · additional instructions     hydrALAZINE 100 MG tablet  Commonly known as:  APRESOLINE  Take 1 tablet (100 mg total) by mouth every 12 (twelve) hours.  What changed:  when to take this     labetalol 100 MG tablet  Commonly known as:  NORMODYNE  Take 5 tablets (500 mg total) by mouth every 12 (twelve) hours.  What changed:    · medication strength  · how much to take  · when to take this     ondansetron 4 MG tablet  Commonly known as:  ZOFRAN  Take 1 tablet (4 mg total) by mouth every 6 (six) hours as needed for Nausea.  What changed:  when to take this     tacrolimus 1 MG Cap  Commonly known as:  PROGRAF  Take 8 capsules (8mg) in the morning and 7 capsules (7mg) in the evening  What changed:    · how much to take  · how to take this  · when to take this  · additional instructions     triamcinolone acetonide 0.1% 0.1 % ointment  Commonly known as:  KENALOG  AAA on arms, legs, and neck bid x 1-2 wks then prn flares only  What changed:  additional instructions        CONTINUE taking these medications    acetaminophen-codeine 300-30mg 300-30 mg Tab  Commonly known as:  TYLENOL #3  Take 1 tablet by mouth every 6 (six) hours as needed.     aspirin 81 MG Chew  Take 1 tablet (81 mg total) by mouth once daily.     citalopram 20 MG tablet  Commonly known as:  CELEXA  TAKE 1 TABLET BY MOUTH EVERY DAY     clindamycin 2 % vaginal cream  Commonly known as:  CLINDESSE  Place 1 full applicator nightly     cloNIDine 0.3 mg/24 hr td ptwk 0.3 mg/24 hr  Commonly known as:   CATAPRES  Place 1 patch onto the skin every 7 days.     conjugated estrogens vaginal cream  Commonly known as:  PREMARIN  Regimen: 2g nightly for 1 week then 1g nightly for 1 week, then 0.5g M/W/F nights     famotidine 40 MG tablet  Commonly known as:  PEPCID  Take 0.5 tablets (20 mg total) by mouth once daily.     food supplemt, lactose-reduced Liqd  Commonly known as:  ENSURE ACTIVE HIGH PROTEIN  Take 236 mLs by mouth 2 (two) times daily.     mycophenolate 250 mg Cap  Commonly known as:  CELLCEPT  Take 1,000 mg by mouth 2 (two) times daily.     NIFEdipine 60 MG (OSM) 24 hr tablet  Commonly known as:  PROCARDIA-XL  Take 2 tablets (120 mg total) by mouth once daily.     predniSONE 1 MG tablet  Commonly known as:  DELTASONE  Take 1 mg by mouth every other day.        STOP taking these medications    lisinopril 40 MG tablet  Commonly known as:  PRINIVIL,ZESTRIL            Indwelling Lines/Drains at time of discharge:   Lines/Drains/Airways     Drain                 Hemodialysis AV Fistula Left forearm -- days                Time spent on the discharge of patient: >35 minutes  Patient was seen and examined on the date of discharge and determined to be suitable for discharge.         Susan Mendoza M.D., M.P.H.  Department of Hospital Medicine  [facility]  (pager) [phone] (spect) [phone]

## 2018-08-20 ENCOUNTER — LAB VISIT (OUTPATIENT)
Dept: LAB | Facility: HOSPITAL | Age: 28
End: 2018-08-20
Attending: INTERNAL MEDICINE
Payer: MEDICARE

## 2018-08-20 DIAGNOSIS — Z94.4 LIVER TRANSPLANTED: ICD-10-CM

## 2018-08-20 LAB
ALBUMIN SERPL BCP-MCNC: 2.7 G/DL
ALP SERPL-CCNC: 636 U/L
ALT SERPL W/O P-5'-P-CCNC: 22 U/L
ANION GAP SERPL CALC-SCNC: 7 MMOL/L
AST SERPL-CCNC: 33 U/L
BASOPHILS # BLD AUTO: 0.01 K/UL
BASOPHILS NFR BLD: 0.3 %
BILIRUB SERPL-MCNC: 0.5 MG/DL
BUN SERPL-MCNC: 42 MG/DL
CALCIUM SERPL-MCNC: 8.8 MG/DL
CHLORIDE SERPL-SCNC: 101 MMOL/L
CO2 SERPL-SCNC: 27 MMOL/L
CREAT SERPL-MCNC: 7.1 MG/DL
DIFFERENTIAL METHOD: ABNORMAL
EOSINOPHIL # BLD AUTO: 0.4 K/UL
EOSINOPHIL NFR BLD: 12.9 %
ERYTHROCYTE [DISTWIDTH] IN BLOOD BY AUTOMATED COUNT: 15.5 %
EST. GFR  (AFRICAN AMERICAN): 8.3 ML/MIN/1.73 M^2
EST. GFR  (NON AFRICAN AMERICAN): 7.2 ML/MIN/1.73 M^2
GLUCOSE SERPL-MCNC: 99 MG/DL
HCT VFR BLD AUTO: 22.8 %
HGB BLD-MCNC: 7.1 G/DL
IMM GRANULOCYTES # BLD AUTO: 0.01 K/UL
IMM GRANULOCYTES NFR BLD AUTO: 0.3 %
LYMPHOCYTES # BLD AUTO: 0.4 K/UL
LYMPHOCYTES NFR BLD: 11.9 %
MCH RBC QN AUTO: 27.6 PG
MCHC RBC AUTO-ENTMCNC: 31.1 G/DL
MCV RBC AUTO: 89 FL
MONOCYTES # BLD AUTO: 0.2 K/UL
MONOCYTES NFR BLD: 4.7 %
NEUTROPHILS # BLD AUTO: 2.2 K/UL
NEUTROPHILS NFR BLD: 69.9 %
NRBC BLD-RTO: 0 /100 WBC
PLATELET # BLD AUTO: 91 K/UL
PMV BLD AUTO: 11.9 FL
POTASSIUM SERPL-SCNC: 5.5 MMOL/L
PROT SERPL-MCNC: 7.3 G/DL
RBC # BLD AUTO: 2.57 M/UL
SODIUM SERPL-SCNC: 135 MMOL/L
WBC # BLD AUTO: 3.19 K/UL

## 2018-08-20 PROCEDURE — 85025 COMPLETE CBC W/AUTO DIFF WBC: CPT

## 2018-08-20 PROCEDURE — 80053 COMPREHEN METABOLIC PANEL: CPT

## 2018-08-20 PROCEDURE — 36415 COLL VENOUS BLD VENIPUNCTURE: CPT | Mod: PO

## 2018-08-20 PROCEDURE — 80197 ASSAY OF TACROLIMUS: CPT

## 2018-08-21 LAB — TACROLIMUS BLD-MCNC: <1.5 NG/ML

## 2018-08-22 ENCOUNTER — TELEPHONE (OUTPATIENT)
Dept: TRANSPLANT | Facility: CLINIC | Age: 28
End: 2018-08-22

## 2018-08-22 NOTE — LETTER
August 22, 2018    Michaelkobetasia Patel  [address] [place] [id number]          Dear Holly Patel:  MRN: [id number]    Your liver lab results were stable, but your Prograf level remains undetected. Compliance with your medications is very important. There are no medicine changes.  Please have your labs drawn again on 10/1/18.      If you cannot have your labs drawn on the scheduled date, it is your responsibility to call the transplant department to reschedule.  To reschedule or make an appointment, please as to speak to or leave a message for my assistant, Jaki Chavis, at [phone].  When leaving a message for Palmira Pollack, or myself, we ask that you leave a brief message regarding your request.    Sincerely,    Violeta Francis, RN, BSN  Liver Transplant Coordinator  [facility]  [address] [place] [id number]  [phone]

## 2018-08-22 NOTE — TELEPHONE ENCOUNTER
Letter sent, liver labs stable but Prograf level remains undetected. Advised pt of need to be compliant with medications. No medication changes are needed. Repeat labs due 10/1/18.

## 2018-08-23 ENCOUNTER — OFFICE VISIT (OUTPATIENT)
Dept: INTERNAL MEDICINE | Facility: CLINIC | Age: 28
End: 2018-08-23
Payer: MEDICARE

## 2018-08-23 ENCOUNTER — TELEPHONE (OUTPATIENT)
Dept: TRANSPLANT | Facility: CLINIC | Age: 28
End: 2018-08-23

## 2018-08-23 VITALS
DIASTOLIC BLOOD PRESSURE: 110 MMHG | OXYGEN SATURATION: 99 % | SYSTOLIC BLOOD PRESSURE: 160 MMHG | HEART RATE: 92 BPM | HEIGHT: 65 IN | WEIGHT: 110 LBS | BODY MASS INDEX: 18.33 KG/M2

## 2018-08-23 DIAGNOSIS — D64.9 ANEMIA, UNSPECIFIED TYPE: ICD-10-CM

## 2018-08-23 DIAGNOSIS — G47.09 OTHER INSOMNIA: ICD-10-CM

## 2018-08-23 DIAGNOSIS — I15.0 RENOVASCULAR HYPERTENSION: Primary | Chronic | ICD-10-CM

## 2018-08-23 PROCEDURE — 99999 PR PBB SHADOW E&M-EST. PATIENT-LVL III: CPT | Mod: PBBFAC,,, | Performed by: INTERNAL MEDICINE

## 2018-08-23 PROCEDURE — 99214 OFFICE O/P EST MOD 30 MIN: CPT | Mod: S$PBB,,, | Performed by: INTERNAL MEDICINE

## 2018-08-23 PROCEDURE — 99213 OFFICE O/P EST LOW 20 MIN: CPT | Mod: PBBFAC | Performed by: INTERNAL MEDICINE

## 2018-08-23 RX ORDER — NIFEDIPINE 60 MG/1
120 TABLET, EXTENDED RELEASE ORAL DAILY
Qty: 60 TABLET | Refills: 11 | Status: ON HOLD | OUTPATIENT
Start: 2018-08-23 | End: 2018-01-01 | Stop reason: SDUPTHER

## 2018-08-23 RX ORDER — TRAZODONE HYDROCHLORIDE 50 MG/1
50 TABLET ORAL NIGHTLY PRN
Qty: 30 TABLET | Refills: 3 | Status: ON HOLD | OUTPATIENT
Start: 2018-08-23 | End: 2018-09-02

## 2018-08-23 NOTE — Clinical Note
"Hi Dr. Ortega, this patient we share has had anemia and has needed some transfusions. She just had a hospital stay 1-2 weeks ago and I think she was transfused. Your last note you write "She has very high BP and is not a candidate for Procrit/Aranesp", she has ESRD and presumably does not make her own Epo, can she still not have Procrit at HD?I assume her low platelets are from liver disease/portal HTN/splenomegaly.I have cced Dr Pinedo her liver doctor as well.Thank you, Stan Sosa"

## 2018-08-23 NOTE — PROGRESS NOTES
Subjective:       Patient ID: Holly Patel is a 27 y.o. female.    Chief Complaint: Follow-up (3 month )    Patient is here for followup for chronic conditions.    Not sleeping well at [facility].    Needs handicap sticker.    Came over 30 min late for 20 min appt due to having HD.    Claims to be adh to her meds, but does not have clonidine patch today and taking 1 tab Labetalol bid instead of 5 tabs bid.    Stable swelling of her abd which goes down with HD sessions.    Mood is stable.        Review of Systems   Constitutional: Positive for activity change and fatigue. Negative for diaphoresis, fever and unexpected weight change.   Respiratory: Positive for shortness of breath (stable). Negative for chest tightness and wheezing.    Cardiovascular: Negative for chest pain, palpitations and leg swelling.   Gastrointestinal: Positive for abdominal distention. Negative for nausea and vomiting.   Skin: Negative for rash and wound.   Neurological: Negative for tremors and weakness.   Hematological: Negative for adenopathy. Does not bruise/bleed easily.   Psychiatric/Behavioral: Positive for sleep disturbance. Negative for confusion.       Objective:      Physical Exam   Constitutional: She is oriented to person, place, and time. She appears well-developed and well-nourished. No distress.   HENT:   Head: Normocephalic and atraumatic.   Eyes: Pupils are equal, round, and reactive to light. No scleral icterus.   Neck: Normal range of motion. No thyromegaly present.   Cardiovascular: Normal rate and regular rhythm. Exam reveals no gallop and no friction rub.   Murmur (mild HSM murmur at apex) heard.  Pulmonary/Chest: Effort normal and breath sounds normal. No respiratory distress. She has no wheezes. She has no rales.   Abdominal: Soft. Bowel sounds are normal. She exhibits distension (moderate). She exhibits no mass. There is no tenderness. There is no rebound and no guarding.   Musculoskeletal: Normal range of motion.  She exhibits no edema or tenderness.   L arm fistula thrill normal, nontender     Lymphadenopathy:     She has no cervical adenopathy.   Neurological: She is alert and oriented to person, place, and time.   Skin: She is not diaphoretic.   Psychiatric: She has a normal mood and affect. Her speech is normal and behavior is normal. Cognition and memory are normal.       Assessment:       1. Renovascular hypertension    2. Other insomnia        Plan:       Holly was seen today for follow-up.    Diagnoses and all orders for this visit:    Renovascular hypertension  -     NIFEdipine (PROCARDIA-XL) 60 MG (OSM) 24 hr tablet; Take 2 tablets (120 mg total) by mouth once daily.  She has not been taking nifedipine it appears by med adh, she has not used patch today and she has been taking 100mg bid labetalol instead of 500mg bid.  Lengthy discussion re importance of btr med adh, see back PA in our clinic in 4 weeks    Other insomnia  -     traZODone (DESYREL) 50 MG tablet; Take 1 tablet (50 mg total) by mouth nightly as needed for Insomnia.    Next     Health Maintenance       Date Due Completion Date    TETANUS VACCINE 09/13/2008 ---    Pneumococcal PPSV23 (High Risk) (1) 11/30/2017 ---    Influenza Vaccine 08/01/2018 12/7/2017 (Done)    Override on 12/7/2017: Done    Pap Smear 03/28/2021 3/28/2018      Next time pnavax    Follow-up in about 3 months (around 11/23/2018) for 1 [facility].    Future Appointments   Date Time Provider Department Center   9/4/2018  9:30 AM Chi Pathak MD [facility] GASTRO [facility]   9/24/2018  9:00 AM IGLESIA Gonzalez-C [facility] IM [facility] D[facility]EL   10/1/2018  8:15 AM LAB, [facility]   11/26/2018 12:20 PM Stan Sosa MD [facility] IM [facility]

## 2018-08-31 ENCOUNTER — HOSPITAL ENCOUNTER (INPATIENT)
Facility: HOSPITAL | Age: 28
LOS: 4 days | Discharge: HOME | DRG: 862 | End: 2018-09-04
Attending: EMERGENCY MEDICINE | Admitting: HOSPITALIST
Payer: MEDICARE

## 2018-08-31 DIAGNOSIS — R50.9 FEVER: ICD-10-CM

## 2018-08-31 DIAGNOSIS — A41.9 SEPSIS: ICD-10-CM

## 2018-08-31 DIAGNOSIS — N18.6 ESRD ON HEMODIALYSIS: Chronic | ICD-10-CM

## 2018-08-31 DIAGNOSIS — Z99.2 ESRD ON HEMODIALYSIS: Chronic | ICD-10-CM

## 2018-08-31 DIAGNOSIS — I38 ENDOCARDITIS: ICD-10-CM

## 2018-08-31 DIAGNOSIS — R50.81 FEVER IN OTHER DISEASES: ICD-10-CM

## 2018-08-31 DIAGNOSIS — J18.9 PNEUMONIA OF RIGHT LOWER LOBE DUE TO INFECTIOUS ORGANISM: Primary | ICD-10-CM

## 2018-08-31 PROBLEM — N99.820 POSTOPERATIVE VAGINAL BLEEDING FOLLOWING GENITOURINARY PROCEDURE: Status: RESOLVED | Noted: 2018-07-09 | Resolved: 2018-08-31

## 2018-08-31 PROBLEM — N76.0 VAGINAL INFECTION: Status: RESOLVED | Noted: 2018-08-08 | Resolved: 2018-08-31

## 2018-08-31 PROBLEM — D61.818 PANCYTOPENIA: Status: RESOLVED | Noted: 2018-04-14 | Resolved: 2018-08-31

## 2018-08-31 PROBLEM — D53.9 MACROCYTIC ANEMIA: Status: RESOLVED | Noted: 2018-05-16 | Resolved: 2018-08-31

## 2018-08-31 PROBLEM — R93.89 ABNORMAL CXR: Status: RESOLVED | Noted: 2018-05-27 | Resolved: 2018-08-31

## 2018-08-31 LAB
ALBUMIN SERPL BCP-MCNC: 2.7 G/DL
ALP SERPL-CCNC: 560 U/L
ALT SERPL W/O P-5'-P-CCNC: 58 U/L
ANION GAP SERPL CALC-SCNC: 12 MMOL/L
AST SERPL-CCNC: 74 U/L
BASOPHILS # BLD AUTO: 0.02 K/UL
BASOPHILS NFR BLD: 0.3 %
BILIRUB SERPL-MCNC: 1.1 MG/DL
BUN SERPL-MCNC: 51 MG/DL
CALCIUM SERPL-MCNC: 8.8 MG/DL
CHLORIDE SERPL-SCNC: 98 MMOL/L
CO2 SERPL-SCNC: 21 MMOL/L
CREAT SERPL-MCNC: 10.1 MG/DL
DIFFERENTIAL METHOD: ABNORMAL
EOSINOPHIL # BLD AUTO: 0.1 K/UL
EOSINOPHIL NFR BLD: 1.8 %
ERYTHROCYTE [DISTWIDTH] IN BLOOD BY AUTOMATED COUNT: 17 %
EST. GFR  (AFRICAN AMERICAN): 5.4 ML/MIN/1.73 M^2
EST. GFR  (NON AFRICAN AMERICAN): 4.7 ML/MIN/1.73 M^2
GLUCOSE SERPL-MCNC: 120 MG/DL
HCT VFR BLD AUTO: 23.4 %
HGB BLD-MCNC: 7.8 G/DL
IMM GRANULOCYTES # BLD AUTO: 0.05 K/UL
IMM GRANULOCYTES NFR BLD AUTO: 0.7 %
LACTATE SERPL-SCNC: 0.8 MMOL/L
LACTATE SERPL-SCNC: 1.2 MMOL/L
LYMPHOCYTES # BLD AUTO: 0.6 K/UL
LYMPHOCYTES NFR BLD: 7.8 %
MAGNESIUM SERPL-MCNC: 2.1 MG/DL
MCH RBC QN AUTO: 28.4 PG
MCHC RBC AUTO-ENTMCNC: 33.3 G/DL
MCV RBC AUTO: 85 FL
MONOCYTES # BLD AUTO: 0.3 K/UL
MONOCYTES NFR BLD: 3.3 %
NEUTROPHILS # BLD AUTO: 6.6 K/UL
NEUTROPHILS NFR BLD: 86.1 %
NRBC BLD-RTO: 0 /100 WBC
PHOSPHATE SERPL-MCNC: 4.1 MG/DL
PLATELET # BLD AUTO: 46 K/UL
PMV BLD AUTO: ABNORMAL FL
POTASSIUM SERPL-SCNC: 3.9 MMOL/L
PROCALCITONIN SERPL IA-MCNC: 6.86 NG/ML
PROT SERPL-MCNC: 7.4 G/DL
RBC # BLD AUTO: 2.75 M/UL
SODIUM SERPL-SCNC: 131 MMOL/L
WBC # BLD AUTO: 7.68 K/UL

## 2018-08-31 PROCEDURE — 96365 THER/PROPH/DIAG IV INF INIT: CPT | Mod: NTX

## 2018-08-31 PROCEDURE — 99285 EMERGENCY DEPT VISIT HI MDM: CPT | Mod: ,,, | Performed by: NURSE PRACTITIONER

## 2018-08-31 PROCEDURE — 99285 EMERGENCY DEPT VISIT HI MDM: CPT | Mod: 25,NTX

## 2018-08-31 PROCEDURE — 96361 HYDRATE IV INFUSION ADD-ON: CPT | Mod: NTX

## 2018-08-31 PROCEDURE — 85025 COMPLETE CBC W/AUTO DIFF WBC: CPT | Mod: NTX

## 2018-08-31 PROCEDURE — 99223 1ST HOSP IP/OBS HIGH 75: CPT | Mod: AI,NTX,, | Performed by: HOSPITALIST

## 2018-08-31 PROCEDURE — 80053 COMPREHEN METABOLIC PANEL: CPT | Mod: NTX

## 2018-08-31 PROCEDURE — 25000003 PHARM REV CODE 250: Mod: NTX | Performed by: HOSPITALIST

## 2018-08-31 PROCEDURE — 93010 ELECTROCARDIOGRAM REPORT: CPT | Mod: NTX,,, | Performed by: INTERNAL MEDICINE

## 2018-08-31 PROCEDURE — 87077 CULTURE AEROBIC IDENTIFY: CPT | Mod: NTX

## 2018-08-31 PROCEDURE — 84100 ASSAY OF PHOSPHORUS: CPT | Mod: NTX

## 2018-08-31 PROCEDURE — 25000003 PHARM REV CODE 250: Mod: NTX | Performed by: NURSE PRACTITIONER

## 2018-08-31 PROCEDURE — 20600001 HC STEP DOWN PRIVATE ROOM: Mod: NTX

## 2018-08-31 PROCEDURE — 83735 ASSAY OF MAGNESIUM: CPT | Mod: NTX

## 2018-08-31 PROCEDURE — 93005 ELECTROCARDIOGRAM TRACING: CPT | Mod: NTX

## 2018-08-31 PROCEDURE — 83605 ASSAY OF LACTIC ACID: CPT | Mod: NTX

## 2018-08-31 PROCEDURE — 84145 PROCALCITONIN (PCT): CPT | Mod: NTX

## 2018-08-31 PROCEDURE — 87186 SC STD MICRODIL/AGAR DIL: CPT | Mod: NTX

## 2018-08-31 PROCEDURE — 96366 THER/PROPH/DIAG IV INF ADDON: CPT | Mod: NTX

## 2018-08-31 PROCEDURE — 63600175 PHARM REV CODE 636 W HCPCS: Mod: NTX | Performed by: NURSE PRACTITIONER

## 2018-08-31 PROCEDURE — 96375 TX/PRO/DX INJ NEW DRUG ADDON: CPT | Mod: NTX

## 2018-08-31 PROCEDURE — 87040 BLOOD CULTURE FOR BACTERIA: CPT | Mod: 59,NTX

## 2018-08-31 RX ORDER — PREDNISONE 1 MG/1
1 TABLET ORAL EVERY OTHER DAY
Status: DISCONTINUED | OUTPATIENT
Start: 2018-09-01 | End: 2018-08-31

## 2018-08-31 RX ORDER — VANCOMYCIN 2 GRAM/500 ML IN 0.9 % SODIUM CHLORIDE INTRAVENOUS
2000
Status: COMPLETED | OUTPATIENT
Start: 2018-08-31 | End: 2018-08-31

## 2018-08-31 RX ORDER — CLONIDINE 0.3 MG/24H
1 PATCH, EXTENDED RELEASE TRANSDERMAL
Status: DISCONTINUED | OUTPATIENT
Start: 2018-09-01 | End: 2018-09-04 | Stop reason: HOSPADM

## 2018-08-31 RX ORDER — CITALOPRAM 10 MG/1
20 TABLET ORAL DAILY
Status: DISCONTINUED | OUTPATIENT
Start: 2018-09-01 | End: 2018-09-04 | Stop reason: HOSPADM

## 2018-08-31 RX ORDER — HYDRALAZINE HYDROCHLORIDE 50 MG/1
100 TABLET, FILM COATED ORAL EVERY 12 HOURS
Status: DISCONTINUED | OUTPATIENT
Start: 2018-09-01 | End: 2018-09-04 | Stop reason: HOSPADM

## 2018-08-31 RX ORDER — ACETAMINOPHEN 325 MG/1
650 TABLET ORAL EVERY 8 HOURS PRN
Status: DISCONTINUED | OUTPATIENT
Start: 2018-08-31 | End: 2018-09-04 | Stop reason: HOSPADM

## 2018-08-31 RX ORDER — FAMOTIDINE 20 MG/1
20 TABLET, FILM COATED ORAL DAILY
Status: DISCONTINUED | OUTPATIENT
Start: 2018-09-01 | End: 2018-09-04 | Stop reason: HOSPADM

## 2018-08-31 RX ORDER — ONDANSETRON 8 MG/1
8 TABLET, ORALLY DISINTEGRATING ORAL EVERY 8 HOURS PRN
Status: DISCONTINUED | OUTPATIENT
Start: 2018-08-31 | End: 2018-09-04 | Stop reason: HOSPADM

## 2018-08-31 RX ORDER — CEFEPIME HYDROCHLORIDE 2 G/1
2 INJECTION, POWDER, FOR SOLUTION INTRAVENOUS
Status: DISCONTINUED | OUTPATIENT
Start: 2018-09-01 | End: 2018-09-01

## 2018-08-31 RX ORDER — HYDRALAZINE HYDROCHLORIDE 25 MG/1
100 TABLET, FILM COATED ORAL ONCE
Status: COMPLETED | OUTPATIENT
Start: 2018-08-31 | End: 2018-08-31

## 2018-08-31 RX ORDER — ACETAMINOPHEN 500 MG
1000 TABLET ORAL
Status: COMPLETED | OUTPATIENT
Start: 2018-08-31 | End: 2018-08-31

## 2018-08-31 RX ORDER — NIFEDIPINE 60 MG/1
120 TABLET, EXTENDED RELEASE ORAL DAILY
Status: DISCONTINUED | OUTPATIENT
Start: 2018-09-01 | End: 2018-09-04 | Stop reason: HOSPADM

## 2018-08-31 RX ORDER — CINACALCET 30 MG/1
30 TABLET, FILM COATED ORAL
Status: DISCONTINUED | OUTPATIENT
Start: 2018-09-01 | End: 2018-09-04 | Stop reason: HOSPADM

## 2018-08-31 RX ORDER — NAPROXEN SODIUM 220 MG/1
81 TABLET, FILM COATED ORAL DAILY
Status: DISCONTINUED | OUTPATIENT
Start: 2018-09-01 | End: 2018-09-04 | Stop reason: HOSPADM

## 2018-08-31 RX ORDER — CEFEPIME HYDROCHLORIDE 2 G/1
2 INJECTION, POWDER, FOR SOLUTION INTRAVENOUS
Status: COMPLETED | OUTPATIENT
Start: 2018-08-31 | End: 2018-08-31

## 2018-08-31 RX ORDER — IRBESARTAN 300 MG/1
300 TABLET ORAL DAILY
Status: DISCONTINUED | OUTPATIENT
Start: 2018-09-01 | End: 2018-09-04 | Stop reason: HOSPADM

## 2018-08-31 RX ADMIN — SODIUM CHLORIDE 1578 ML: 0.9 INJECTION, SOLUTION INTRAVENOUS at 03:08

## 2018-08-31 RX ADMIN — ONDANSETRON 8 MG: 8 TABLET, ORALLY DISINTEGRATING ORAL at 10:08

## 2018-08-31 RX ADMIN — HYDRALAZINE HYDROCHLORIDE 100 MG: 25 TABLET ORAL at 08:08

## 2018-08-31 RX ADMIN — ACETAMINOPHEN 1000 MG: 500 TABLET ORAL at 03:08

## 2018-08-31 RX ADMIN — LABETALOL HCL 500 MG: 300 TABLET, FILM COATED ORAL at 09:08

## 2018-08-31 RX ADMIN — CEFEPIME 2 G: 2 INJECTION, POWDER, FOR SOLUTION INTRAVENOUS at 04:08

## 2018-08-31 RX ADMIN — ACETAMINOPHEN 650 MG: 325 TABLET, FILM COATED ORAL at 10:08

## 2018-08-31 RX ADMIN — VANCOMYCIN HYDROCHLORIDE 2000 MG: 10 INJECTION, POWDER, LYOPHILIZED, FOR SOLUTION INTRAVENOUS at 05:08

## 2018-08-31 NOTE — ED NOTES
Pt ok to eat per Savanah Chauhan, pt given pillow for comfort. TV on. Family member at bedside. Side rails up x 2.

## 2018-08-31 NOTE — ED PROVIDER NOTES
Encounter Date: 8/31/2018    SCRIBE #1 NOTE: I, Drew Rubin am scribing for, and in the presence of,  Dr. Garcia . I have scribed the following portions of the note - the APC attestation and the EKG reading.       History     Chief Complaint   Patient presents with    Fever     liver xplant 26yrs ago, on dialsis     Patient is a 27-year-old female with medical history of anemia, depression, end-stage renal disease on dialysis (T,T,S), seizures and thrombocytopenia presenting to the ED for fever.  Patient states last night she had fever T-max of 104.3°.  Patient states she attempted to take Children's Tylenol with no relief of symptoms. Patient states today her fever was 104°.  Patient endorses generalized body aches.  Patient denies any cough, congestion, shortness of breath or chest pain.          Review of patient's allergies indicates:   Allergen Reactions    Chloral hydrate      Other reaction(s): Hallucinations  Other reaction(s): Hives    Hydrocodone Other (See Comments)     Mental status changes     Past Medical History:   Diagnosis Date    Anemia in ESRD (end-stage renal disease) 10/12/2015    dialysis tues, thursday, sat; access left arm    Chronic rejection of liver transplant 3/22/2016    Depression     Encounter for blood transfusion     ESRD on hemodialysis 9/30/2015    History of recent hospitalization 05/2018    pneumonia    History of splenomegaly 4/12/2016    Immunosuppressed 8/5/2017    Iron deficiency anemia secondary to inadequate dietary iron intake 8/16/2017    She [facility].   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)    Moderate protein-calorie malnutrition 8/16/2017    MRSA bacteremia 8/6/2017    Pneumonia     Prophylactic immunotherapy 8/4/2014    Renovascular hypertension 10/2/2015    Secondary hyperparathyroidism 8/5/2017    Seizures     Sialadenitis 3/21/2018    Thrombocytopenia  2016     Past Surgical History:   Procedure Laterality Date     SECTION      x 2    LIVER BIOPSY      LIVER TRANSPLANT  1992    TUBAL LIGATION  2010     Family History   Problem Relation Age of Onset    Hypertension Mother     Hypertension Father     Melanoma Neg Hx     Breast cancer Neg Hx     Colon cancer Neg Hx     Ovarian cancer Neg Hx      Social History     Tobacco Use    Smoking status: Never Smoker    Smokeless tobacco: Never Used   Substance Use Topics    Alcohol use: No    Drug use: No     Review of Systems   Constitutional: Positive for activity change and fever. Negative for appetite change and diaphoresis.   HENT: Negative for sore throat.    Respiratory: Negative for cough, chest tightness, shortness of breath and wheezing.    Cardiovascular: Negative for chest pain and palpitations.   Gastrointestinal: Negative for abdominal pain, constipation, diarrhea, nausea and vomiting.   Genitourinary: Negative for dysuria.   Musculoskeletal: Positive for myalgias ( generalized). Negative for back pain, neck pain and neck stiffness.   Skin: Negative for rash.   Neurological: Negative for dizziness, syncope, weakness, numbness and headaches.   Hematological: Does not bruise/bleed easily.       Physical Exam     Initial Vitals [18 1351]   BP Pulse Resp Temp SpO2   (!) 213/130 (!) 124 (!) 22 (!) 103 °F (39.4 °C) 99 %      MAP       --         Physical Exam    Nursing note and vitals reviewed.  Constitutional: She appears well-developed and well-nourished. She is cooperative. She does not have a sickly appearance. She does not appear ill. No distress.   HENT:   Head: Normocephalic and atraumatic.   Right Ear: Tympanic membrane and ear canal normal.   Left Ear: Tympanic membrane and ear canal normal.   Mouth/Throat: Uvula is midline, oropharynx is clear and moist and mucous membranes are normal.   Eyes: Conjunctivae, EOM and lids are normal. Pupils are equal, round, and reactive to  light.   Neck: Trachea normal, normal range of motion, full passive range of motion without pain and phonation normal. Neck supple. No JVD present.   Cardiovascular: Regular rhythm, normal heart sounds and intact distal pulses. Tachycardia present.    Pulses:       Radial pulses are 2+ on the right side, and 2+ on the left side.   Pulmonary/Chest: Effort normal and breath sounds normal.   Abdominal: Soft. Normal appearance and bowel sounds are normal. She exhibits no distension. There is no tenderness. There is no rigidity, no rebound and no guarding.   Musculoskeletal: Normal range of motion.   Neurological: She is alert and oriented to person, place, and time. She has normal strength. No cranial nerve deficit or sensory deficit. GCS eye subscore is 4. GCS verbal subscore is 5. GCS motor subscore is 6.   Skin: Skin is warm, dry and intact. Capillary refill takes less than 2 seconds. No abrasion, no laceration and no rash noted. No cyanosis. Nails show no clubbing.   Psychiatric: She has a normal mood and affect. Her speech is normal and behavior is normal. Judgment and thought content normal. Cognition and memory are normal.         ED Course   Procedures  Labs Reviewed   CBC W/ AUTO DIFFERENTIAL - Abnormal; Notable for the following components:       Result Value    RBC 2.75 (*)     Hemoglobin 7.8 (*)     Hematocrit 23.4 (*)     RDW 17.0 (*)     Platelets 46 (*)     Immature Granulocytes 0.7 (*)     Immature Grans (Abs) 0.05 (*)     Lymph # 0.6 (*)     Gran% 86.1 (*)     Lymph% 7.8 (*)     Mono% 3.3 (*)     All other components within normal limits    Narrative:     red sub for gold top  08/31/2018  15:35    COMPREHENSIVE METABOLIC PANEL - Abnormal; Notable for the following components:    Sodium 131 (*)     CO2 21 (*)     Glucose 120 (*)     BUN, Bld 51 (*)     Creatinine 10.1 (*)     Albumin 2.7 (*)     Total Bilirubin 1.1 (*)     Alkaline Phosphatase 560 (*)     AST 74 (*)     ALT 58 (*)     eGFR if   American 5.4 (*)     eGFR if non  4.7 (*)     All other components within normal limits    Narrative:     red sub for gold top  08/31/2018  15:35    PROCALCITONIN - Abnormal; Notable for the following components:    Procalcitonin 6.86 (*)     All other components within normal limits    Narrative:     red sub for gold top  08/31/2018  15:35    CULTURE, BLOOD   CULTURE, BLOOD   LACTIC ACID, PLASMA    Narrative:     red sub for gold top  08/31/2018  15:35    MAGNESIUM    Narrative:     red sub for gold top  08/31/2018  15:35    PHOSPHORUS    Narrative:     red sub for gold top  08/31/2018  15:35    LACTIC ACID, PLASMA     EKG Readings: (Independently Interpreted)   Initial Reading: No STEMI.       Imaging Results          X-Ray Chest PA And Lateral (Final result)  Result time 08/31/18 15:37:35    Final result by Jude Forbes MD (08/31/18 15:37:35)                 Impression:      Findings concerning for left lower lobe aspiration or pneumonia.  Short-term follow-up chest radiography after therapy is recommended to resolution.    Additional findings as above.      Electronically signed by: Jude Forbes MD  Date:    08/31/2018  Time:    15:37             Narrative:    EXAMINATION:  XR CHEST PA AND LATERAL    CLINICAL HISTORY:  Fever, unspecified    TECHNIQUE:  PA and lateral views of the chest were performed.    COMPARISON:  Chest radiograph 06/22/2018    FINDINGS:  Cardiac silhouette remains enlarged similar to prior without convincing evidence of failure.  Mediastinal contours are within normal limits.  Interval resolution of previous pleural effusions.  Overall improved aeration of the right lung base with minimal vascular crowding suggesting subsegmental atelectasis; however, early aspiration or pneumonia not excluded.  Interval increased consolidation with air bronchograms in the medial left lower lobe concerning for aspiration or pneumonia in this patient with history of fever.  Grossly stable 8  mm nodular density projected over the right lung base on the frontal view likely corresponding to nodule within the right middle lobe on prior CT.  No pneumothorax.  Included osseous structures are intact.                                       APC / Resident Notes:   Emergent evaluation of a 26 yo female patient presenting to the ER with chief complaint of fever since yesterday.  Patient states she has generalized body aches since the fever started.  On exam patient A&O x3. Temperature in triage was 104.  Patient denies any cough or congestion.   Patient is anuric.  Patient denies any other complaints on exam.  Patient attempted to take Children's Tylenol with no relief of symptoms. I will give fluids, get labs, imaging, medicate and reassess.  Differential diagnoses include but are not limited to viral infection, seasonal influenza, UTI, strep pharyngitis, otitis media, pneumonia, or meningitis. I discussed the care of this patient with my Supervising Physician.      Patient's CBC at her baseline of hemoglobin 7.8 with hematocrit of 23.4.  The thought low at 46.  CMP remarkable for a sodium of 131 with a BUN of 50 and a creatinine of 10.1.  Lactate negative. Mag and phos within normal limits. Procalcitonin elevated at 6.86.  Chest x-ray concerning for left lower lobe aspiration or pneumonia.  Will start IV antibiotics admit to inpatient for further treatment.  Patient and family updated on plan of care all questions and concerns addressed.       Scribe Attestation:   Scribe #1: I performed the above scribed service and the documentation accurately describes the services I performed. I attest to the accuracy of the note.    Attending Attestation:     Physician Attestation Statement for NP/PA:   I discussed this assessment and plan of this patient with the NP/PA, but I did not personally examine the patient. The face to face encounter was performed by the NP/PA.                     Clinical Impression:   The primary  encounter diagnosis was Pneumonia of right lower lobe due to infectious organism. A diagnosis of Fever was also pertinent to this visit.      Disposition:   Disposition: Admitted  Condition: Stable                        Savanah Chauhan NP  08/31/18 [id number]

## 2018-08-31 NOTE — HPI
"Ms. Patel is a 28 y/o female with ESRD (on HD MWF), h/o liver transplant (on immunosuppression with prograf and prednisone), poorly controlled HTN with medication non-compliance, diastolic HF, and anemia from chronic kidney disease and blood loss (s/p LEEP). She presented to the ED today for fever.  Patient states last night she had fever T-max of 104.3° and attempted to take Children's Tylenol with no relief of symptoms. Patient states today her fever was 104°. Patient endorses generalized body aches that she awoke with yesterday but denies any cough, congestion, shortness of breath or chest pain. She is anuric.     Notably, she had a LEEP procedure in June 2018 which was complicated by persistent vaginal bleeding and multiple admissions for anemia. Per previous d/c summ: "On 8/8, they were able to perform an EUA where they noted "cervix visualized with clot in place, no active bleeding. Sutures from prior surgeries remain visible and intact. Insufficient cervix for Eddoloop. Monsel's and packing placed."  Packing was subsequently removed on POD 3. Since discharge (8/16) patient reports that the bleeding has stopped. She has not had any pelvic pain. She does report some black dry discharge with a foul odor.    "

## 2018-08-31 NOTE — SUBJECTIVE & OBJECTIVE
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 ()    Moderate protein-calorie malnutrition 2017    MRSA bacteremia 2017    Pneumonia     Prophylactic immunotherapy 2014    Renovascular hypertension 10/2/2015    Secondary hyperparathyroidism 2017    Seizures     Sialadenitis 3/21/2018    Thrombocytopenia 2016       Past Surgical History:   Procedure Laterality Date     SECTION      x 2    LIVER BIOPSY      LIVER TRANSPLANT  1992    TUBAL LIGATION         Review of patient's allergies indicates:   Allergen Reactions    Chloral hydrate      Other reaction(s): Hallucinations  Other reaction(s): Hives    Hydrocodone Other (See Comments)     Mental status changes       No current facility-administered medications on file prior to encounter.      Current Outpatient Medications on File Prior to Encounter   Medication Sig    acetaminophen-codeine 300-30mg (TYLENOL #3) 300-30 mg Tab Take 1 tablet by mouth every 6 (six) hours as needed.    aspirin 81 MG Chew Take 1 tablet (81 mg total) by mouth once daily.    cinacalcet (SENSIPAR) 30 MG Tab Take 1 tablet (30 mg total) by mouth daily with breakfast. (Patient taking differently: Take 30 mg by mouth. On Tuesday, Thursday, and Saturday with dialysis)    citalopram (CELEXA) 20 MG tablet TAKE 1 TABLET BY MOUTH EVERY DAY    clindamycin (CLINDESSE) 2 %  vaginal cream Place 1 full applicator nightly    cloNIDine 0.3 mg/24 hr td ptwk (CATAPRES) 0.3 mg/24 hr Place 1 patch onto the skin every 7 days.    conjugated estrogens (PREMARIN) vaginal cream Regimen: 2g nightly for 1 week then 1g nightly for 1 week, then 0.5g M/W/F nights    famotidine (PEPCID) 40 MG tablet Take 0.5 tablets (20 mg total) by mouth once daily.    food supplemt, lactose-reduced (ENSURE ACTIVE HIGH PROTEIN) Liqd Take 236 mLs by mouth 2 (two) times daily.    hydrALAZINE (APRESOLINE) 100 MG tablet Take 1 tablet (100 mg total) by mouth every 12 (twelve) hours.    irbesartan (AVAPRO) 300 MG tablet Take 1 tablet (300 mg total) by mouth once daily.    labetalol (NORMODYNE) 100 MG tablet Take 5 tablets (500 mg total) by mouth every 12 (twelve) hours.    mycophenolate (CELLCEPT) 250 mg Cap Take 1,000 mg by mouth 2 (two) times daily.    NIFEdipine (PROCARDIA-XL) 60 MG (OSM) 24 hr tablet Take 2 tablets (120 mg total) by mouth once daily.    ondansetron (ZOFRAN) 4 MG tablet Take 1 tablet (4 mg total) by mouth every 6 (six) hours as needed for Nausea. (Patient taking differently: Take 4 mg by mouth once daily. )    predniSONE (DELTASONE) 1 MG tablet Take 1 mg by mouth every other day.    tacrolimus (PROGRAF) 1 MG Cap Take 8 capsules (8mg) in the morning and 7 capsules (7mg) in the evening    traZODone (DESYREL) 50 MG tablet Take 1 tablet (50 mg total) by mouth nightly as needed for Insomnia.    triamcinolone acetonide 0.1% (KENALOG) 0.1 % ointment AAA on arms, legs, and neck bid x 1-2 wks then prn flares only (Patient taking differently: Apply to affected area(s) on arms, legs, and neck twice daily x 1-2 wks then as needed for flares only)     Family History     Problem Relation (Age of Onset)    Hypertension Mother, Father        Tobacco Use    Smoking status: Never Smoker    Smokeless tobacco: Never Used   Substance and Sexual Activity    Alcohol use: No    Drug use: No    Sexual activity:  No     Partners: Male     Review of Systems   Constitutional: Positive for chills, fatigue and fever. Negative for activity change, appetite change, diaphoresis and unexpected weight change.   HENT: Negative for congestion, sore throat, trouble swallowing and voice change.    Eyes: Negative for photophobia and visual disturbance.   Respiratory: Negative for apnea, cough, choking, chest tightness, shortness of breath, wheezing and stridor.    Cardiovascular: Negative for chest pain, palpitations and leg swelling.   Gastrointestinal: Negative for abdominal pain, blood in stool, constipation, diarrhea, nausea and vomiting.   Endocrine: Negative for cold intolerance and heat intolerance.   Genitourinary: Positive for vaginal discharge. Negative for dyspareunia, flank pain, genital sores, pelvic pain, vaginal bleeding and vaginal pain.        Anuric   Musculoskeletal: Positive for myalgias. Negative for arthralgias, neck pain and neck stiffness.   Skin: Negative for color change, pallor, rash and wound.   Allergic/Immunologic: Negative for immunocompromised state.   Neurological: Positive for weakness. Negative for dizziness and headaches.   Psychiatric/Behavioral: Negative for agitation, behavioral problems and confusion.     Objective:     Vital Signs (Most Recent):  Temp: 100.2 °F (37.9 °C) (08/31/18 1718)  Pulse: 110 (08/31/18 1802)  Resp: (!) 29 (08/31/18 1802)  BP: (!) 175/111 (08/31/18 1802)  SpO2: 100 % (08/31/18 1802) Vital Signs (24h Range):  Temp:  [100.2 °F (37.9 °C)-103 °F (39.4 °C)] 100.2 °F (37.9 °C)  Pulse:  [110-124] 110  Resp:  [21-29] 29  SpO2:  [99 %-100 %] 100 %  BP: (175-213)/(106-139) 175/111     Weight: 52.6 kg (116 lb)  Body mass index is 19.3 kg/m².    Physical Exam   Constitutional: She is oriented to person, place, and time. She appears well-developed and well-nourished. No distress.   HENT:   Head: Normocephalic and atraumatic.   Mouth/Throat: No oropharyngeal exudate.   Eyes: Conjunctivae  and EOM are normal. Pupils are equal, round, and reactive to light. No scleral icterus.   Neck: Normal range of motion. Neck supple. No tracheal deviation present. No thyromegaly present.   Cardiovascular: Regular rhythm, normal heart sounds and intact distal pulses. Tachycardia present.   No murmur heard.  Pulmonary/Chest: Effort normal. No respiratory distress. She has no wheezes. She has rales (LMLF). She exhibits no tenderness.   Abdominal: Soft. Bowel sounds are normal. She exhibits no distension. There is no tenderness. There is no rebound and no guarding.   Musculoskeletal: Normal range of motion. She exhibits no edema or tenderness.   L AVF, good thrill, non-erythematous, non-tender, +warmth   Lymphadenopathy:     She has no cervical adenopathy.   Neurological: She is alert and oriented to person, place, and time. No cranial nerve deficit.   Skin: Skin is warm and dry. No rash noted. She is not diaphoretic. No erythema. No pallor.   Psychiatric: She has a normal mood and affect. Her behavior is normal. Judgment and thought content normal.         CRANIAL NERVES     CN III, IV, VI   Pupils are equal, round, and reactive to light.  Extraocular motions are normal.        Significant Labs:   CBC:   Recent Labs   Lab  08/31/18   1515   WBC  7.68   HGB  7.8*   HCT  23.4*   PLT  46*     CMP:   Recent Labs   Lab  08/31/18   1515   NA  131*   K  3.9   CL  98   CO2  21*   GLU  120*   BUN  51*   CREATININE  10.1*   CALCIUM  8.8   PROT  7.4   ALBUMIN  2.7*   BILITOT  1.1*   ALKPHOS  560*   AST  74*   ALT  58*   ANIONGAP  12   EGFRNONAA  4.7*     Lactic Acid:   Recent Labs   Lab  08/31/18   1515   LACTATE  1.2       Significant Imaging: I have reviewed and interpreted all pertinent imaging results/findings within the past 24 hours.

## 2018-09-01 PROBLEM — R78.81 GRAM-POSITIVE BACTEREMIA: Status: ACTIVE | Noted: 2018-09-01

## 2018-09-01 PROBLEM — E87.1 HYPONATREMIA: Status: ACTIVE | Noted: 2018-09-01

## 2018-09-01 LAB
ABO + RH BLD: NORMAL
ALBUMIN SERPL BCP-MCNC: 2.2 G/DL
ANION GAP SERPL CALC-SCNC: 12 MMOL/L
BASOPHILS # BLD AUTO: 0 K/UL
BASOPHILS NFR BLD: 0 %
BLD GP AB SCN CELLS X3 SERPL QL: NORMAL
BLD PROD TYP BPU: NORMAL
BLOOD UNIT EXPIRATION DATE: NORMAL
BLOOD UNIT TYPE CODE: 7300
BLOOD UNIT TYPE: NORMAL
BUN SERPL-MCNC: 63 MG/DL
CALCIUM SERPL-MCNC: 8.1 MG/DL
CHLORIDE SERPL-SCNC: 103 MMOL/L
CO2 SERPL-SCNC: 18 MMOL/L
CODING SYSTEM: NORMAL
CREAT SERPL-MCNC: 10.8 MG/DL
DIFFERENTIAL METHOD: ABNORMAL
DISPENSE STATUS: NORMAL
EOSINOPHIL # BLD AUTO: 0.3 K/UL
EOSINOPHIL NFR BLD: 5.6 %
ERYTHROCYTE [DISTWIDTH] IN BLOOD BY AUTOMATED COUNT: 16.9 %
EST. GFR  (AFRICAN AMERICAN): 5 ML/MIN/1.73 M^2
EST. GFR  (NON AFRICAN AMERICAN): 4.4 ML/MIN/1.73 M^2
GLUCOSE SERPL-MCNC: 102 MG/DL
HCT VFR BLD AUTO: 19.8 %
HGB BLD-MCNC: 6.6 G/DL
IMM GRANULOCYTES # BLD AUTO: 0.01 K/UL
IMM GRANULOCYTES NFR BLD AUTO: 0.2 %
LYMPHOCYTES # BLD AUTO: 0.5 K/UL
LYMPHOCYTES NFR BLD: 8.7 %
MCH RBC QN AUTO: 27.8 PG
MCHC RBC AUTO-ENTMCNC: 33.3 G/DL
MCV RBC AUTO: 84 FL
MONOCYTES # BLD AUTO: 0.3 K/UL
MONOCYTES NFR BLD: 5.1 %
NEUTROPHILS # BLD AUTO: 4.6 K/UL
NEUTROPHILS NFR BLD: 80.4 %
NRBC BLD-RTO: 0 /100 WBC
NUM UNITS TRANS PACKED RBC: NORMAL
PHOSPHATE SERPL-MCNC: 4.7 MG/DL
PLATELET # BLD AUTO: 40 K/UL
PMV BLD AUTO: ABNORMAL FL
POTASSIUM SERPL-SCNC: 4.4 MMOL/L
RBC # BLD AUTO: 2.37 M/UL
SODIUM SERPL-SCNC: 133 MMOL/L
TACROLIMUS BLD-MCNC: <1.5 NG/ML
VANCOMYCIN SERPL-MCNC: 37.4 UG/ML
WBC # BLD AUTO: 5.72 K/UL

## 2018-09-01 PROCEDURE — 99232 SBSQ HOSP IP/OBS MODERATE 35: CPT | Mod: NTX,,, | Performed by: INTERNAL MEDICINE

## 2018-09-01 PROCEDURE — 80069 RENAL FUNCTION PANEL: CPT | Mod: NTX

## 2018-09-01 PROCEDURE — 99222 1ST HOSP IP/OBS MODERATE 55: CPT | Mod: NTX,,, | Performed by: INTERNAL MEDICINE

## 2018-09-01 PROCEDURE — P9016 RBC LEUKOCYTES REDUCED: HCPCS | Mod: NTX

## 2018-09-01 PROCEDURE — 63600175 PHARM REV CODE 636 W HCPCS: Mod: NTX | Performed by: HOSPITALIST

## 2018-09-01 PROCEDURE — 86920 COMPATIBILITY TEST SPIN: CPT | Mod: NTX

## 2018-09-01 PROCEDURE — 36430 TRANSFUSION BLD/BLD COMPNT: CPT | Mod: NTX

## 2018-09-01 PROCEDURE — 85025 COMPLETE CBC W/AUTO DIFF WBC: CPT | Mod: NTX

## 2018-09-01 PROCEDURE — 80202 ASSAY OF VANCOMYCIN: CPT | Mod: NTX

## 2018-09-01 PROCEDURE — 25000003 PHARM REV CODE 250: Mod: NTX | Performed by: HOSPITALIST

## 2018-09-01 PROCEDURE — 36415 COLL VENOUS BLD VENIPUNCTURE: CPT | Mod: NTX

## 2018-09-01 PROCEDURE — 90935 HEMODIALYSIS ONE EVALUATION: CPT | Mod: NTX

## 2018-09-01 PROCEDURE — 80197 ASSAY OF TACROLIMUS: CPT | Mod: NTX

## 2018-09-01 PROCEDURE — 99223 1ST HOSP IP/OBS HIGH 75: CPT | Mod: NTX,,, | Performed by: INTERNAL MEDICINE

## 2018-09-01 PROCEDURE — 87040 BLOOD CULTURE FOR BACTERIA: CPT | Mod: NTX

## 2018-09-01 PROCEDURE — 99233 SBSQ HOSP IP/OBS HIGH 50: CPT | Mod: NTX,,, | Performed by: HOSPITALIST

## 2018-09-01 PROCEDURE — 20600001 HC STEP DOWN PRIVATE ROOM: Mod: NTX

## 2018-09-01 PROCEDURE — 99223 1ST HOSP IP/OBS HIGH 75: CPT | Mod: 24,NTX,, | Performed by: OBSTETRICS & GYNECOLOGY

## 2018-09-01 PROCEDURE — 86850 RBC ANTIBODY SCREEN: CPT | Mod: NTX

## 2018-09-01 RX ORDER — CEFEPIME HYDROCHLORIDE 1 G/1
1 INJECTION, POWDER, FOR SOLUTION INTRAMUSCULAR; INTRAVENOUS ONCE
Status: COMPLETED | OUTPATIENT
Start: 2018-09-01 | End: 2018-09-01

## 2018-09-01 RX ORDER — SODIUM CHLORIDE 9 MG/ML
INJECTION, SOLUTION INTRAVENOUS ONCE
Status: COMPLETED | OUTPATIENT
Start: 2018-09-01 | End: 2018-09-01

## 2018-09-01 RX ORDER — HYDROCODONE BITARTRATE AND ACETAMINOPHEN 500; 5 MG/1; MG/1
TABLET ORAL
Status: DISCONTINUED | OUTPATIENT
Start: 2018-09-01 | End: 2018-09-04 | Stop reason: HOSPADM

## 2018-09-01 RX ORDER — SODIUM CHLORIDE 0.9 % (FLUSH) 0.9 %
5 SYRINGE (ML) INJECTION
Status: DISCONTINUED | OUTPATIENT
Start: 2018-09-01 | End: 2018-09-04 | Stop reason: HOSPADM

## 2018-09-01 RX ADMIN — CINACALCET HYDROCHLORIDE 30 MG: 30 TABLET, COATED ORAL at 06:09

## 2018-09-01 RX ADMIN — CEFEPIME 1 G: 1 INJECTION, POWDER, FOR SOLUTION INTRAMUSCULAR; INTRAVENOUS at 07:09

## 2018-09-01 RX ADMIN — IRBESARTAN 300 MG: 300 TABLET ORAL at 08:09

## 2018-09-01 RX ADMIN — CITALOPRAM HYDROBROMIDE 20 MG: 10 TABLET ORAL at 08:09

## 2018-09-01 RX ADMIN — ONDANSETRON 8 MG: 8 TABLET, ORALLY DISINTEGRATING ORAL at 07:09

## 2018-09-01 RX ADMIN — SODIUM CHLORIDE: 0.9 INJECTION, SOLUTION INTRAVENOUS at 12:09

## 2018-09-01 RX ADMIN — NIFEDIPINE 120 MG: 60 TABLET, FILM COATED, EXTENDED RELEASE ORAL at 08:09

## 2018-09-01 RX ADMIN — HYDRALAZINE HYDROCHLORIDE 100 MG: 50 TABLET ORAL at 08:09

## 2018-09-01 RX ADMIN — FAMOTIDINE 20 MG: 20 TABLET ORAL at 08:09

## 2018-09-01 RX ADMIN — LABETALOL HCL 500 MG: 300 TABLET, FILM COATED ORAL at 08:09

## 2018-09-01 RX ADMIN — ASPIRIN 81 MG CHEWABLE TABLET 81 MG: 81 TABLET CHEWABLE at 08:09

## 2018-09-01 RX ADMIN — ACETAMINOPHEN 650 MG: 325 TABLET, FILM COATED ORAL at 07:09

## 2018-09-01 RX ADMIN — TACROLIMUS 8 MG: 5 CAPSULE ORAL at 08:09

## 2018-09-01 RX ADMIN — TACROLIMUS 7 MG: 5 CAPSULE ORAL at 07:09

## 2018-09-01 NOTE — PROGRESS NOTES
Progress Note   Hospital Medicine         Patient Name: Holly Patel  MRN:  [id number]  [facility] Medicine Team: [facility] HOSP MED L Alexander Chicas MD  Date of Admission:  8/31/2018     Length of Stay:  LOS: 1 day   Expected Discharge Date: 9/5/2018  Principal Problem:  Sepsis       Subjective:     Interval History/Overnight Events:  Patient doing well today; blood cultures came back positive for gram positive bacteremia; on vanc, transplant ID consult; will get repeat blood cultures and 2d echo  - will also transfuse 1 unit of PRBC today;     Review of Systems   Constitutional: Negative for chills, fatigue, fever.   HENT: Negative for sore throat, trouble swallowing.    Eyes: Negative for photophobia, visual disturbance.   Respiratory: Negative for cough, shortness of breath.    Cardiovascular: Negative for chest pain, palpitations, leg swelling.   Gastrointestinal: Negative for abdominal pain, constipation, diarrhea, nausea, vomiting.   Endocrine: Negative for cold intolerance, heat intolerance.   Genitourinary: Negative for dysuria, frequency.   Musculoskeletal: Negative for arthralgias, myalgias.   Skin: Negative for rash, wound, erythema   Neurological: Negative for dizziness, syncope, weakness, light-headedness.   Psychiatric/Behavioral: Negative for confusion, hallucinations, anxiety  All other systems reviewed and are negative.    Objective:     Temp:  [98 °F (36.7 °C)-101.7 °F (38.7 °C)]   Pulse:  []   Resp:  [16-24]   BP: (102-200)/()   SpO2:  [95 %-100 %]       Physical Exam:  Constitutional: Appears well-developed and well-nourished.   Head: Normocephalic and atraumatic.   Mouth/Throat: Oropharynx is clear and moist.   Eyes: EOM are normal. Pupils are equal, round, and reactive to light. No scleral icterus.   Neck: Normal range of motion. Neck supple.   Cardiovascular: Normal rate and regular rhythm.  No murmur heard.  Pulmonary/Chest: Effort normal and breath sounds normal. No respiratory  distress. No wheezes, rales, or rhonchi  Abdominal: Soft. Bowel sounds are normal.  No distension or tenderness  Musculoskeletal: Normal range of motion. No edema.   Neurological: Alert and oriented to person, place, and time.   Skin: Skin is warm and dry.   Psychiatric: Normal mood and affect. Behavior is normal.     Recent Labs   Lab  08/31/18 1515 09/01/18   1121   WBC  7.68  5.72   HGB  7.8*  6.6*   HCT  23.4*  19.8*   PLT  46*  40*     Recent Labs   Lab  08/31/18 1515  09/01/18   0745   NA  131*  133*   K  3.9  4.4   CL  98  103   CO2  21*  18*   BUN  51*  63*   CREATININE  10.1*  10.8*   GLU  120*  102   CALCIUM  8.8  8.1*   MG  2.1   --    PHOS  4.1  4.7*     Recent Labs   Lab  08/31/18 1515 09/01/18   0745   ALKPHOS  560*   --    ALT  58*   --    AST  74*   --    ALBUMIN  2.7*  2.2*   PROT  7.4   --    BILITOT  1.1*   --      No results for input(s): POCTGLUCOSE in the last 168 hours.     aspirin  81 mg Oral Daily    cinacalcet  30 mg Oral Every Tues, Thurs, Sat    citalopram  20 mg Oral Daily    cloNIDine 0.3 mg/24 hr td ptwk  1 patch Transdermal Q7 Days    famotidine  20 mg Oral Daily    hydrALAZINE  100 mg Oral Q12H    irbesartan  300 mg Oral Daily    labetalol  500 mg Oral Q12H    NIFEdipine  120 mg Oral Daily    tacrolimus  8 mg Oral Daily    And    tacrolimus  7 mg Oral Daily       Assessment and Plan     Ms. Holly Patel is a 27 y.o. female who presented to [facility] on 8/31/2018 with     Hospital Course:    Ms. Holly Patel was admitted to Hospital Medicine for management of     Active Hospital Problems    Diagnosis  POA    *Sepsis [A41.9]  Yes    Gram-positive bacteremia [R78.81]  Yes    Hyponatremia [E87.1]  Yes    Pneumonia of right lower lobe due to infectious organism [J18.1]  Yes    Hypertensive emergency [I16.1]  Yes    Thrombocytopenia [D69.6]  Yes    Immunosuppressed [D89.9]  Yes     Chronic    Secondary hyperparathyroidism [N25.81]  Yes     Anemia in ESRD (end-stage renal disease) [N18.6, D63.1]  Yes     Chronic    Renovascular hypertension [I15.0]  Yes     Chronic    ESRD on hemodialysis [N18.6, Z99.2]  Not Applicable     Chronic    Prophylactic immunotherapy [Z29.8]  Not Applicable   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 [Z94.4]  Not Applicable     Chronic     hemangioendothelioma s/p LTx (1992)        Resolved Hospital Problems   No resolved problems to display.     # Sepsis due to gram positive bacteremia  - possibly due to recent gyn procedure  - cont vanc and cefepime for now,renally dose  - repeat blood cultures and get 2d echo  - transplant ID consulted and appreciate recs    # Anemia of ESRD  # Thrombocytopenia  - will transfuse 1 unit of PRBC on 9/1 with hemoglobin 6.6    # H/O Liver transplant  # Immunosuppressed status  - hepatology consulted  - cont prograf and prednisone and daily prograf lvevels    # ESRD on HD  - nephrology consulted; HD as per them    # Renovascular HTN  - cont BP meds    # Hyponatremia  - correct with HD            Diet:  Renal   GI PPx:    DVT PPx:    Goals of Care:  full    High Risk Conditions:    Sepsis     Disposition:  Possibly Tuesday     Alexander Chicas MD  Medical Director [facility] Medicine  Spectra:  [phone]  Pager: [phone]

## 2018-09-01 NOTE — SUBJECTIVE & OBJECTIVE
Obstetric History       T0      L2     SAB0   TAB0   Ectopic0   Multiple0   Live Births2       # Outcome Date GA Lbr Peter/2nd Weight Sex Delivery Anes PTL Lv   2       CS-LTranv   JOANN   1       CS-LTranv   JOANN        Past Medical History:   Diagnosis Date    Anemia in ESRD (end-stage renal disease) 10/12/2015    dialysis tues, thursday, sat; access left arm    Chronic rejection of liver transplant 3/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 ()    Moderate protein-calorie malnutrition 2017    MRSA bacteremia 2017    Pneumonia     Prophylactic immunotherapy 2014    Renovascular hypertension 10/2/2015    Secondary hyperparathyroidism 2017    Seizures     Sialadenitis 3/21/2018    Thrombocytopenia 2016     Past Surgical History:   Procedure Laterality Date     SECTION      x 2    LIVER BIOPSY      LIVER TRANSPLANT  1992    TUBAL LIGATION         PTA Medications   Medication Sig    acetaminophen-codeine 300-30mg (TYLENOL #3) 300-30 mg Tab Take 1 tablet by mouth every 6 (six) hours as needed.    aspirin 81 MG Chew Take 1 tablet (81 mg total) by mouth once daily.    cinacalcet (SENSIPAR) 30 MG Tab Take 1 tablet (30 mg total) by mouth daily with breakfast. (Patient taking differently: Take 30 mg by mouth. On Tuesday, Thursday, and Saturday with dialysis)    citalopram (CELEXA) 20 MG tablet TAKE 1 TABLET BY MOUTH EVERY DAY    clindamycin (CLINDESSE) 2 % vaginal cream Place 1 full applicator nightly    cloNIDine 0.3 mg/24 hr td ptwk (CATAPRES)  0.3 mg/24 hr Place 1 patch onto the skin every 7 days.    conjugated estrogens (PREMARIN) vaginal cream Regimen: 2g nightly for 1 week then 1g nightly for 1 week, then 0.5g M/W/F nights    famotidine (PEPCID) 40 MG tablet Take 0.5 tablets (20 mg total) by mouth once daily.    food supplemt, lactose-reduced (ENSURE ACTIVE HIGH PROTEIN) Liqd Take 236 mLs by mouth 2 (two) times daily.    hydrALAZINE (APRESOLINE) 100 MG tablet Take 1 tablet (100 mg total) by mouth every 12 (twelve) hours.    irbesartan (AVAPRO) 300 MG tablet Take 1 tablet (300 mg total) by mouth once daily.    labetalol (NORMODYNE) 100 MG tablet Take 5 tablets (500 mg total) by mouth every 12 (twelve) hours.    mycophenolate (CELLCEPT) 250 mg Cap Take 1,000 mg by mouth 2 (two) times daily.    NIFEdipine (PROCARDIA-XL) 60 MG (OSM) 24 hr tablet Take 2 tablets (120 mg total) by mouth once daily.    ondansetron (ZOFRAN) 4 MG tablet Take 1 tablet (4 mg total) by mouth every 6 (six) hours as needed for Nausea. (Patient taking differently: Take 4 mg by mouth once daily. )    predniSONE (DELTASONE) 1 MG tablet Take 1 mg by mouth every other day.    tacrolimus (PROGRAF) 1 MG Cap Take 8 capsules (8mg) in the morning and 7 capsules (7mg) in the evening    traZODone (DESYREL) 50 MG tablet Take 1 tablet (50 mg total) by mouth nightly as needed for Insomnia.    triamcinolone acetonide 0.1% (KENALOG) 0.1 % ointment AAA on arms, legs, and neck bid x 1-2 wks then prn flares only (Patient taking differently: Apply to affected area(s) on arms, legs, and neck twice daily x 1-2 wks then as needed for flares only)       Review of patient's allergies indicates:   Allergen Reactions    Chloral hydrate      Other reaction(s): Hallucinations  Other reaction(s): Hives    Hydrocodone Other (See Comments)     Mental status changes        Family History     Problem Relation (Age of Onset)    Hypertension Mother, Father        Tobacco Use    Smoking status: Never  Smoker    Smokeless tobacco: Never Used   Substance and Sexual Activity    Alcohol use: No    Drug use: No    Sexual activity: No     Partners: Male     Review of Systems   Constitutional: Positive for fatigue and fever.   HENT: Negative.    Eyes: Negative.    Respiratory: Negative.    Cardiovascular: Negative.    Gastrointestinal: Negative.    Endocrine: Negative.    Genitourinary: Positive for vaginal discharge.   Musculoskeletal: Negative.    Skin:  Negative.   Neurological: Negative.    Hematological: Negative.    Psychiatric/Behavioral: Negative.    Breast: negative.       Objective:     Vital Signs (Most Recent):  Temp: 98.9 °F (37.2 °C) (09/01/18 0758)  Pulse: 103 (09/01/18 0800)  Resp: 18 (09/01/18 0444)  BP: (!) 198/123 (09/01/18 0800)  SpO2: 95 % (09/01/18 0758) Vital Signs (24h Range):  Temp:  [98.3 °F (36.8 °C)-103 °F (39.4 °C)] 98.9 °F (37.2 °C)  Pulse:  [] 103  Resp:  [18-29] 18  SpO2:  [95 %-100 %] 95 %  BP: (169-213)/(106-139) 198/123     Weight: 50.9 kg (112 lb 3.4 oz)  Body mass index is 18.67 kg/m².    No LMP recorded. Patient is not currently having periods (Reason: Other).    Physical Exam:               Genitourinary:   Genitourinary Comments: Cervix mildly friable, minimal spotting, physiologic discharge. No signs of infection. Bimanual exam without fundal tenderness or CMT.                      Laboratory:  Recent Lab Results       09/01/18  0745 09/01/18  0343 08/31/18  1930 08/31/18  1542 08/31/18  1516      Immature Granulocytes          Immature Grans (Abs)          Procalcitonin          Albumin 2.2         Alkaline Phosphatase          ALT          Anion Gap 12         AST          Baso #          Basophil%          Total Bilirubin          Blood Culture, Routine    Gram stain sachin bottle: Gram positive cocci in clusters resembling Staph [P] Gram stain aer bottle: Gram positive cocci in clusters resembling Staph [P]         Results called to and read back by:Fiordaliza Taylor RN  09/01/2018  08:29[P] Gram stain sachin bottle: Gram positive cocci in clusters resembling Staph [P]         Gram stain aer bottle: Gram positive cocci in clusters resembling Staph [P] Results called to and read back by:Fiordaliza Taylor RN 09/01/2018  08:29[P]         Positive results previously called 09/01/2018  09:08[P]      BUN, Bld 63         Calcium 8.1         Chloride 103         CO2 18         Creatinine 10.8         Differential Method          eGFR if  5.0         eGFR if non  4.4  Comment:  Calculation used to obtain the estimated glomerular filtration  rate (eGFR) is the CKD-EPI equation.            Eos #          Eosinophil%          Glucose 102         Gran # (ANC)          Gran%          Hematocrit          Hemoglobin          Lactate, Saul   0.8  Comment:  Falsely low lactic acid results can be found in samples   containing >=13.0 mg/dL total bilirubin and/or >=3.5 mg/dL   direct bilirubin.         Lymph #          Lymph%          Magnesium          MCH          MCHC          MCV          Mono #          Mono%          MPV          nRBC          Phosphorus 4.7         Platelets          Potassium 4.4         Total Protein          RBC          RDW          Sodium 133         Tacrolimus Lvl  <1.5  Comment:  Testing performed by Liquid Chromatography-Tandem  Mass Spectrometry (LC-MS/MS).  This test was developed and its performance characteristics  determined by [facility], Department of Pathology  and Laboratory Medicine in a manner consistent with CLIA  requirements. It has not been cleared or approved by the [place]  Food and Drug Administration.  This test is used for clinical   purposes.  It should not be regarded as investigational or for  research.          Vancomycin, Random  37.4        WBC                      08/31/18  1515      Immature Granulocytes 0.7     Immature Grans (Abs) 0.05  Comment:  Mild elevation in immature granulocytes is non specific and   can  be seen in a variety of conditions including stress response,   acute inflammation, trauma and pregnancy. Correlation with other   laboratory and clinical findings is essential.       Procalcitonin 6.86  Comment:  Please re-baseline procalcitonin if a patient is transferred from  other facilities to [facility].  A concentration < 0.25 ng/mL represents a low risk bacterial   infection.  Procalcitonin may not be accurate among patients with localized   infection, recent trauma or major surgery, immunosuppressed state,   invasive fungal infection, renal dysfunction. Decisions regarding   initiation or continuation of antibiotic therapy should not be based   solely on procalcitonin levels.       Albumin 2.7     Alkaline Phosphatase 560     ALT 58     Anion Gap 12     AST 74     Baso # 0.02     Basophil% 0.3     Total Bilirubin 1.1  Comment:  For infants and newborns, interpretation of results should be based  on gestational age, weight and in agreement with clinical  observations.  Premature Infant recommended reference ranges:  Up to 24 hours.............<8.0 mg/dL  Up to 48 hours............<12.0 mg/dL  3-5 days..................<15.0 mg/dL  6-29 days.................<15.0 mg/dL       Blood Culture, Routine      BUN, Bld 51     Calcium 8.8     Chloride 98     CO2 21     Creatinine 10.1     Differential Method Automated     eGFR if African American 5.4     eGFR if non  4.7  Comment:  Calculation used to obtain the estimated glomerular filtration  rate (eGFR) is the CKD-EPI equation.        Eos # 0.1     Eosinophil% 1.8     Glucose 120     Gran # (ANC) 6.6     Gran% 86.1     Hematocrit 23.4     Hemoglobin 7.8     Lactate, Saul 1.2  Comment:  Falsely low lactic acid results can be found in samples   containing >=13.0 mg/dL total bilirubin and/or >=3.5 mg/dL   direct bilirubin.       Lymph # 0.6     Lymph% 7.8     Magnesium 2.1     MCH 28.4     MCHC 33.3     MCV 85     Mono # 0.3     Mono% 3.3     MPV  SEE COMMENT  Comment:  Result not available.     nRBC 0     Phosphorus 4.1     Platelets 46     Potassium 3.9     Total Protein 7.4     RBC 2.75     RDW 17.0     Sodium 131     Tacrolimus Lvl      Vancomycin, Random      WBC 7.68           Diagnostic Results:  Labs: Reviewed

## 2018-09-01 NOTE — SUBJECTIVE & OBJECTIVE
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 ()    Moderate protein-calorie malnutrition 2017    MRSA bacteremia 2017    Pneumonia     Prophylactic immunotherapy 2014    Renovascular hypertension 10/2/2015    Secondary hyperparathyroidism 2017    Seizures     Sialadenitis 3/21/2018    Thrombocytopenia 2016       Past Surgical History:   Procedure Laterality Date     SECTION      x 2    LIVER BIOPSY      LIVER TRANSPLANT  1992    TUBAL LIGATION         Review of patient's allergies indicates:   Allergen Reactions    Chloral hydrate      Other reaction(s): Hallucinations  Other reaction(s): Hives    Hydrocodone Other (See Comments)     Mental status changes       Medications:  Medications Prior to Admission   Medication Sig    acetaminophen-codeine 300-30mg (TYLENOL #3) 300-30 mg Tab Take 1 tablet by mouth every 6 (six) hours as needed.    aspirin 81 MG Chew Take 1 tablet (81 mg total) by mouth once daily.    cinacalcet (SENSIPAR) 30 MG Tab Take 1 tablet (30 mg total) by mouth daily with breakfast. (Patient taking differently: Take 30 mg by mouth. On Tuesday, Thursday, and Saturday with dialysis)    citalopram (CELEXA) 20 MG tablet TAKE 1 TABLET BY MOUTH EVERY DAY    clindamycin (CLINDESSE) 2 % vaginal cream Place 1 full applicator nightly    cloNIDine 0.3 mg/24 hr td ptwk (CATAPRES)  0.3 mg/24 hr Place 1 patch onto the skin every 7 days.    conjugated estrogens (PREMARIN) vaginal cream Regimen: 2g nightly for 1 week then 1g nightly for 1 week, then 0.5g M/W/F nights    famotidine (PEPCID) 40 MG tablet Take 0.5 tablets (20 mg total) by mouth once daily.    food supplemt, lactose-reduced (ENSURE ACTIVE HIGH PROTEIN) Liqd Take 236 mLs by mouth 2 (two) times daily.    hydrALAZINE (APRESOLINE) 100 MG tablet Take 1 tablet (100 mg total) by mouth every 12 (twelve) hours.    irbesartan (AVAPRO) 300 MG tablet Take 1 tablet (300 mg total) by mouth once daily.    labetalol (NORMODYNE) 100 MG tablet Take 5 tablets (500 mg total) by mouth every 12 (twelve) hours.    mycophenolate (CELLCEPT) 250 mg Cap Take 1,000 mg by mouth 2 (two) times daily.    NIFEdipine (PROCARDIA-XL) 60 MG (OSM) 24 hr tablet Take 2 tablets (120 mg total) by mouth once daily.    ondansetron (ZOFRAN) 4 MG tablet Take 1 tablet (4 mg total) by mouth every 6 (six) hours as needed for Nausea. (Patient taking differently: Take 4 mg by mouth once daily. )    predniSONE (DELTASONE) 1 MG tablet Take 1 mg by mouth every other day.    tacrolimus (PROGRAF) 1 MG Cap Take 8 capsules (8mg) in the morning and 7 capsules (7mg) in the evening    traZODone (DESYREL) 50 MG tablet Take 1 tablet (50 mg total) by mouth nightly as needed for Insomnia.    triamcinolone acetonide 0.1% (KENALOG) 0.1 % ointment AAA on arms, legs, and neck bid x 1-2 wks then prn flares only (Patient taking differently: Apply to affected area(s) on arms, legs, and neck twice daily x 1-2 wks then as needed for flares only)     Antibiotics (From admission, onward)    None        Antifungals (From admission, onward)    None        Antivirals (From admission, onward)    None           Immunization History   Administered Date(s) Administered    Influenza - Quadrivalent - PF 10/12/2015    PPD Test 09/17/2015, 10/02/2015    Pneumococcal Conjugate - 13 Valent 10/05/2017        Family History     Problem Relation (Age of Onset)    Hypertension Mother, Father        Social History     Socioeconomic History    Marital status: Legally      Spouse name: None    Number of children: None    Years of education: None    Highest education level: None   Social Needs    Financial resource strain: None    Food insecurity - worry: None    Food insecurity - inability: None    Transportation needs - medical: None    Transportation needs - non-medical: None   Occupational History    None   Tobacco Use    Smoking status: Never Smoker    Smokeless tobacco: Never Used   Substance and Sexual Activity    Alcohol use: No    Drug use: No    Sexual activity: No     Partners: Male   Other Topics Concern    Are you pregnant or think you may be? No    Breast-feeding No   Social History Narrative    Lives 2 kids, 7 and 8, and nephew. Her mom helps with kids.     Review of Systems   Constitutional: Positive for chills, fatigue and fever.   HENT: Negative.    Eyes: Negative.    Respiratory: Negative.    Gastrointestinal: Negative.    Endocrine: Negative.    Genitourinary: Negative.    Musculoskeletal: Negative.    Allergic/Immunologic: Negative.    Neurological: Negative.    Hematological: Negative.    Psychiatric/Behavioral: Negative.      Objective:     Vital Signs (Most Recent):  Temp: 99 °F (37.2 °C) (09/01/18 1545)  Pulse: 106 (09/01/18 1616)  Resp: 18 (09/01/18 1600)  BP: (!) 154/96 (09/01/18 1600)  SpO2: 95 % (09/01/18 0758) Vital Signs (24h Range):  Temp:  [98 °F (36.7 °C)-101.7 °F (38.7 °C)] 99 °F (37.2 °C)  Pulse:  [] 106  Resp:  [16-29] 18  SpO2:  [95 %-100 %] 95 %  BP: (102-200)/() 154/96     Weight: 50.9 kg (112 lb 3.4 oz)  Body mass index is 18.67 kg/m².    Estimated Creatinine Clearance: 6.3 mL/min (A) (based on SCr of 10.8 mg/dL (H)).    Physical Exam   Constitutional: She is oriented to person, place, and time. She appears well-developed and well-nourished.    HENT:   Head: Normocephalic and atraumatic.   Eyes: Pupils are equal, round, and reactive to light.   Neck: Normal range of motion. Neck supple.   Cardiovascular: Normal rate and regular rhythm.   Pulmonary/Chest: Effort normal and breath sounds normal.   Abdominal: Soft. Bowel sounds are normal.   Musculoskeletal: Normal range of motion.   Neurological: She is alert and oriented to person, place, and time.   Skin: Skin is warm and dry.   Dialysis access on left forearm no pain or erythema        Significant Labs: All pertinent labs within the past 24 hours have been reviewed.    Significant Imaging: I have reviewed all pertinent imaging results/findings within the past 24 hours.

## 2018-09-01 NOTE — ASSESSMENT & PLAN NOTE
Presumed source is lung vs pelvic (given history of recent instrumentation, packing, and foul-smelling discharge).   Radiologist interpretation of CXR is LLL infiltrate suspicious for aspiration/pneumonia. However, patient denies any cough, productive sputum, dyspnea, pleuritic pain.   LA wnl (x2), PCT elevated >6, SIRS 3/4.   Sepsis protocol initiated. Received 30ml/kg in ED. Started on Cefepime, Vanco.   I have ordered Cefepime to be given Tu/Th/Sa after HD. This may need to be adjusted depending on when patient is dialyzed. I've also ordered vanc trough in am.   BCx are pending.   I spoke with Gynecology resident on call about case and they will perform pelvic exam in am. Consult placed.

## 2018-09-01 NOTE — ED NOTES
Spoke with hospital medicine regarding patient's blood pressure. Will place order for PO medication

## 2018-09-01 NOTE — PROGRESS NOTES
HD treatment started. No complications with access to left lower arm. Button holes needles used. Lines secured and telemetry in place. Complaints of dizziness when moving. Blood pressure low at start of treatment.

## 2018-09-01 NOTE — NURSING
Report received care assumed patient arrived from emergency department.  AAOx4 ambulatory independently.  Mother at bedside.  Skin intact.  Telemetry monitoring in place sinus tach 110.  Patient feeling chills.  Temp 101.7F.  BP elevated. Clonidine patch in place to right arm.   Reports feeling bad since yesterday and missed her dialysis session. She is anuric with left forearm dialysis graft.   Last dialyzed on Tuesday 8/28.  Began vomiting brown mucus with undigested food approx 300ml.  States she did eat dinner this evening.  Administered zofran 8mg PO and 650mg tylenol.  Has a cough with pleuritic pain.  Patient and mother updated on plan with Dialysis tomorrow and nephrology consult.  She is on prograf for history of liver transplant.  Hepatology consulted.  She denies any vaginal bleeding but states she had 4 episodes of uncontrolled bleeding requiring hospitalizations at [facility] in July and August.  Provided her with peripads.  Gynecology consulted.  Will continue to monitor.

## 2018-09-01 NOTE — HPI
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 Hepatology is being consulted for management of immunosuppression.     Patient states that she takes her medications prograf 7/8 mg, and prednisone EOD. The patient has history of non-compliance with medications in the past.     On evaluation today she noted improvement of her fevers. She denies abdominal pain, N/V.

## 2018-09-01 NOTE — CONSULTS
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 Hepatology is being consulted for management of immunosuppression.     Patient states that she takes her medications prograf 7/8 mg, and prednisone EOD. The patient has history of non-compliance with medications in the past.     On evaluation today she noted improvement of her fevers. She denies abdominal pain, N/V.    Review of Systems   Constitutional: Positive for fatigue and fever. Negative for activity change, appetite change and chills.   HENT: Negative for trouble swallowing.    Gastrointestinal: Negative for abdominal distention, abdominal pain, anal bleeding, blood in stool, constipation, diarrhea, nausea, rectal pain and vomiting.   Genitourinary: Negative for difficulty urinating and dysuria.   Musculoskeletal: Negative for arthralgias and back pain.   Skin: Negative for color change and pallor.   Neurological: Negative for dizziness and headaches.   Psychiatric/Behavioral: Negative for agitation, behavioral problems and confusion.       Past Medical History:   Diagnosis Date    Anemia in ESRD (end-stage renal disease) 10/12/2015    dialysis tues, thursday, sat; access left arm    Chronic rejection of liver transplant 3/22/2016    Depression     Encounter for blood transfusion     ESRD on hemodialysis 9/30/2015     History of recent hospitalization 2018    pneumonia    History of splenomegaly 2016    Immunosuppressed 2017    Iron deficiency anemia secondary to inadequate dietary iron intake 2017    [facility]   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 ()    Moderate protein-calorie malnutrition 2017    MRSA bacteremia 2017    Pneumonia     Prophylactic immunotherapy 2014    Renovascular hypertension 10/2/2015    Secondary hyperparathyroidism 2017    Seizures     Sialadenitis 3/21/2018    Thrombocytopenia 2016       Past Surgical History:   Procedure Laterality Date     SECTION      x 2    LIVER BIOPSY      LIVER TRANSPLANT  1992    TUBAL LIGATION         Family history of liver disease: No    Review of patient's allergies indicates:   Allergen Reactions    Chloral hydrate      Other reaction(s): Hallucinations  Other reaction(s): Hives    Hydrocodone Other (See Comments)     Mental status changes       Tobacco Use    Smoking status: Never Smoker    Smokeless tobacco: Never Used   Substance and Sexual Activity    Alcohol use: No    Drug use: No    Sexual activity: No     Partners: Male       Medications Prior to Admission   Medication Sig Dispense Refill Last Dose    acetaminophen-codeine 300-30mg (TYLENOL #3) 300-30 mg Tab Take 1 tablet by mouth every 6 (six) hours as needed. 10 tablet 0 Taking    aspirin 81 MG Chew Take 1 tablet (81 mg total) by mouth once daily.  0 Taking    cinacalcet (SENSIPAR) 30 MG Tab Take 1 tablet (30 mg total) by mouth daily with breakfast. (Patient taking differently: Take 30 mg by mouth. On Tuesday, Thursday, and Saturday with dialysis) 30 tablet 11 Taking    citalopram (CELEXA) 20 MG tablet TAKE 1 TABLET BY MOUTH EVERY DAY 30 tablet 1 Taking    clindamycin (CLINDESSE) 2 % vaginal cream Place 1 full applicator nightly 40 g 3 Taking     cloNIDine 0.3 mg/24 hr td ptwk (CATAPRES) 0.3 mg/24 hr Place 1 patch onto the skin every 7 days. 4 patch 11 Taking    conjugated estrogens (PREMARIN) vaginal cream Regimen: 2g nightly for 1 week then 1g nightly for 1 week, then 0.5g M/W/F nights 30 g 11 Taking    famotidine (PEPCID) 40 MG tablet Take 0.5 tablets (20 mg total) by mouth once daily. 15 tablet 11 Taking    food supplemt, lactose-reduced (ENSURE ACTIVE HIGH PROTEIN) Liqd Take 236 mLs by mouth 2 (two) times daily. 60 Can 6 Taking    hydrALAZINE (APRESOLINE) 100 MG tablet Take 1 tablet (100 mg total) by mouth every 12 (twelve) hours. 60 tablet 11 Taking    irbesartan (AVAPRO) 300 MG tablet Take 1 tablet (300 mg total) by mouth once daily. 90 tablet 3 Taking    labetalol (NORMODYNE) 100 MG tablet Take 5 tablets (500 mg total) by mouth every 12 (twelve) hours. 300 tablet 11 Taking    mycophenolate (CELLCEPT) 250 mg Cap Take 1,000 mg by mouth 2 (two) times daily.   Taking    NIFEdipine (PROCARDIA-XL) 60 MG (OSM) 24 hr tablet Take 2 tablets (120 mg total) by mouth once daily. 60 tablet 11     ondansetron (ZOFRAN) 4 MG tablet Take 1 tablet (4 mg total) by mouth every 6 (six) hours as needed for Nausea. (Patient taking differently: Take 4 mg by mouth once daily. ) 15 tablet 0 Taking    predniSONE (DELTASONE) 1 MG tablet Take 1 mg by mouth every other day.   Taking    tacrolimus (PROGRAF) 1 MG Cap Take 8 capsules (8mg) in the morning and 7 capsules (7mg) in the evening 450 capsule 0 Taking    traZODone (DESYREL) 50 MG tablet Take 1 tablet (50 mg total) by mouth nightly as needed for Insomnia. 30 tablet 3     triamcinolone acetonide 0.1% (KENALOG) 0.1 % ointment AAA on arms, legs, and neck bid x 1-2 wks then prn flares only (Patient taking differently: Apply to affected area(s) on arms, legs, and neck twice daily x 1-2 wks then as needed for flares only) 80 g 3 Taking       Objective:     Vital Signs (Most Recent):  Temp: 99 °F (37.2 °C) (09/01/18  1515)  Pulse: 97 (09/01/18 1515)  Resp: 18 (09/01/18 1515)  BP: 134/79 (09/01/18 1515)  SpO2: 95 % (09/01/18 0758) Vital Signs (24h Range):  Temp:  [98 °F (36.7 °C)-101.7 °F (38.7 °C)] 99 °F (37.2 °C)  Pulse:  [] 97  Resp:  [16-29] 18  SpO2:  [95 %-100 %] 95 %  BP: (102-200)/() 134/79     Weight: 50.9 kg (112 lb 3.4 oz) (08/31/18 2230)  Body mass index is 18.67 kg/m².    Physical Exam   Constitutional: She is oriented to person, place, and time. She appears well-developed.   HENT:   Head: Normocephalic.   Eyes: Conjunctivae are normal. Pupils are equal, round, and reactive to light. No scleral icterus.   Neck: Normal range of motion. Neck supple.   Cardiovascular: Normal rate and regular rhythm.   Pulmonary/Chest: Effort normal and breath sounds normal.   Abdominal: Soft. Bowel sounds are normal. She exhibits no distension and no mass. There is no tenderness. There is no guarding.   Musculoskeletal: Normal range of motion. She exhibits no edema.   Neurological: She is alert and oriented to person, place, and time.   Skin: Skin is warm and dry.   Psychiatric: She has a normal mood and affect.       MELD-Na score: 21 at 8/9/2018  4:40 AM  MELD score: 21 at 8/9/2018  4:40 AM  Calculated from:  Serum Creatinine: 0  Serum Sodium: 137 mmol/L at 8/9/2018  4:40 AM  Total Bilirubin: 0.7 mg/dL (Rounded to 1 mg/dL) at 8/9/2018  4:40 AM  INR(ratio): 1.1 at 8/7/2018  8:06 AM  Age: 27 years    Significant Labs:  CBC:   Recent Labs   Lab  09/01/18   1121   WBC  5.72   RBC  2.37*   HGB  6.6*   HCT  19.8*   PLT  40*     BMP:   Recent Labs   Lab  09/01/18   0745   GLU  102   NA  133*   K  4.4   CL  103   CO2  18*   BUN  63*   CREATININE  10.8*   CALCIUM  8.1*     CMP:   Recent Labs   Lab  08/31/18   1515  09/01/18   0745   GLU  120*  102   CALCIUM  8.8  8.1*   ALBUMIN  2.7*  2.2*   PROT  7.4   --    NA  131*  133*   K  3.9  4.4   CO2  21*  18*   CL  98  103   BUN  51*  63*   CREATININE  10.1*  10.8*   ALKPHOS  560*   --     ALT  58*   --    AST  74*   --    BILITOT  1.1*   --      Coagulation: No results for input(s): PT, INR, APTT in the last 168 hours.    Significant Imaging:  Labs: Reviewed    Assessment/Plan:     Liver replaced by transplant     27 year old female with a history of ESRD on HD and s/p LTx in 1992 for a hemangioendotheliamo with history of chronic rejection currently admitted with sepsis possibly 2/2 pneumonia. Hepatology is being consulted for management of immunosuppression.     During recent hospitalization patient's prograf level was stable on program 7/8 mg. Recent prograf level post-hospitalization <1.5, which likely indicates non-compliance. Will hold prednisone int he setting of acute sepsis.     Recommendations:  --Daily CMP, CBC, and INR  --Daily Tacrolimus  --Continue tacrolimus 7/8 current dose  --hold prednisone for now                   Thank you for your consult. I will follow-up with patient. Please contact us if you have any additional questions.    Jenniffer Servin MD  Hepatology  [facility]

## 2018-09-01 NOTE — H&P
"[facility] Hospital Medicine  History & Physical    Patient Name: Holly Patel  MRN: [id number]  Admission Date: 8/31/2018  Attending Physician: Alexander Chicas MD   Primary Care Provider: Stan Sosa MD    [facility] Medicine Team: [facility] MED S Savanah Pickens MD     Patient information was obtained from patient and ER records.     Subjective:     Principal Problem:Sepsis    Chief Complaint:   Chief Complaint   Patient presents with    Fever     liver xplant 26yrs ago, on dialsis        HPI: Ms. Patel is a 26 y/o female with ESRD (on HD MWF), h/o liver transplant (on immunosuppression with prograf and prednisone), poorly controlled HTN with medication non-compliance, diastolic HF, and anemia from chronic kidney disease and blood loss (s/p LEEP). She presented to the ED today for fever.  Patient states last night she had fever T-max of 104.3°  and attempted to take Children's Tylenol with no relief of symptoms. Patient states today her fever was 104°. Patient endorses generalized body aches that she awoke with yesterday but denies any cough, congestion, shortness of breath or chest pain. She is anuric.     Notably, she had a LEEP procedure in June 2018 which was complicated by persistent vaginal bleeding and multiple admissions for anemia. Per previous d/c summ: "On 8/8, they were able to perform an EUA where they noted "cervix visualized with clot in place, no active bleeding. Sutures from prior surgeries remain visible and intact. Insufficient cervix for Eddoloop. Monsel's and packing placed."  Packing was subsequently removed on POD 3. Since discharge (8/16) patient reports that the bleeding has stopped. She has not had any pelvic pain. She does report some black dry discharge with a foul odor.      Past Medical History:   Diagnosis Date    Anemia in ESRD (end-stage renal disease) 10/12/2015    dialysis tues, thursday, sat; access left arm    Chronic rejection of liver " transplant 3/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 ()    Moderate protein-calorie malnutrition 2017    MRSA bacteremia 2017    Pneumonia     Prophylactic immunotherapy 2014    Renovascular hypertension 10/2/2015    Secondary hyperparathyroidism 2017    Seizures     Sialadenitis 3/21/2018    Thrombocytopenia 2016       Past Surgical History:   Procedure Laterality Date     SECTION      x 2    LIVER BIOPSY      LIVER TRANSPLANT  1992    TUBAL LIGATION         Review of patient's allergies indicates:   Allergen Reactions    Chloral hydrate      Other reaction(s): Hallucinations  Other reaction(s): Hives    Hydrocodone Other (See Comments)     Mental status changes       No current facility-administered medications on file prior to encounter.      Current Outpatient Medications on File Prior to Encounter   Medication Sig    acetaminophen-codeine 300-30mg (TYLENOL #3) 300-30 mg Tab Take 1 tablet by mouth every 6 (six) hours as needed.    aspirin 81 MG Chew Take 1 tablet (81 mg total) by mouth once daily.    cinacalcet (SENSIPAR) 30 MG Tab Take 1 tablet (30 mg total) by mouth daily with breakfast. (Patient taking differently: Take 30 mg by mouth. On Tuesday, Thursday, and Saturday with dialysis)    citalopram (CELEXA) 20 MG tablet TAKE 1 TABLET BY MOUTH EVERY DAY    clindamycin (CLINDESSE) 2 % vaginal cream Place 1 full applicator nightly    cloNIDine 0.3 mg/24 hr td ptwk (CATAPRES) 0.3 mg/24 hr Place 1 patch onto the skin every 7 days.    conjugated estrogens  (PREMARIN) vaginal cream Regimen: 2g nightly for 1 week then 1g nightly for 1 week, then 0.5g M/W/F nights    famotidine (PEPCID) 40 MG tablet Take 0.5 tablets (20 mg total) by mouth once daily.    food supplemt, lactose-reduced (ENSURE ACTIVE HIGH PROTEIN) Liqd Take 236 mLs by mouth 2 (two) times daily.    hydrALAZINE (APRESOLINE) 100 MG tablet Take 1 tablet (100 mg total) by mouth every 12 (twelve) hours.    irbesartan (AVAPRO) 300 MG tablet Take 1 tablet (300 mg total) by mouth once daily.    labetalol (NORMODYNE) 100 MG tablet Take 5 tablets (500 mg total) by mouth every 12 (twelve) hours.    mycophenolate (CELLCEPT) 250 mg Cap Take 1,000 mg by mouth 2 (two) times daily.    NIFEdipine (PROCARDIA-XL) 60 MG (OSM) 24 hr tablet Take 2 tablets (120 mg total) by mouth once daily.    ondansetron (ZOFRAN) 4 MG tablet Take 1 tablet (4 mg total) by mouth every 6 (six) hours as needed for Nausea. (Patient taking differently: Take 4 mg by mouth once daily. )    predniSONE (DELTASONE) 1 MG tablet Take 1 mg by mouth every other day.    tacrolimus (PROGRAF) 1 MG Cap Take 8 capsules (8mg) in the morning and 7 capsules (7mg) in the evening    traZODone (DESYREL) 50 MG tablet Take 1 tablet (50 mg total) by mouth nightly as needed for Insomnia.    triamcinolone acetonide 0.1% (KENALOG) 0.1 % ointment AAA on arms, legs, and neck bid x 1-2 wks then prn flares only (Patient taking differently: Apply to affected area(s) on arms, legs, and neck twice daily x 1-2 wks then as needed for flares only)     Family History     Problem Relation (Age of Onset)    Hypertension Mother, Father        Tobacco Use    Smoking status: Never Smoker    Smokeless tobacco: Never Used   Substance and Sexual Activity    Alcohol use: No    Drug use: No    Sexual activity: No     Partners: Male     Review of Systems   Constitutional: Positive for chills, fatigue and fever. Negative for activity change, appetite change, diaphoresis and  unexpected weight change.   HENT: Negative for congestion, sore throat, trouble swallowing and voice change.    Eyes: Negative for photophobia and visual disturbance.   Respiratory: Negative for apnea, cough, choking, chest tightness, shortness of breath, wheezing and stridor.    Cardiovascular: Negative for chest pain, palpitations and leg swelling.   Gastrointestinal: Negative for abdominal pain, blood in stool, constipation, diarrhea, nausea and vomiting.   Endocrine: Negative for cold intolerance and heat intolerance.   Genitourinary: Positive for vaginal discharge. Negative for dyspareunia, flank pain, genital sores, pelvic pain, vaginal bleeding and vaginal pain.        Anuric   Musculoskeletal: Positive for myalgias. Negative for arthralgias, neck pain and neck stiffness.   Skin: Negative for color change, pallor, rash and wound.   Allergic/Immunologic: Negative for immunocompromised state.   Neurological: Positive for weakness. Negative for dizziness and headaches.   Psychiatric/Behavioral: Negative for agitation, behavioral problems and confusion.     Objective:     Vital Signs (Most Recent):  Temp: 100.2 °F (37.9 °C) (08/31/18 1718)  Pulse: 110 (08/31/18 1802)  Resp: (!) 29 (08/31/18 1802)  BP: (!) 175/111 (08/31/18 1802)  SpO2: 100 % (08/31/18 1802) Vital Signs (24h Range):  Temp:  [100.2 °F (37.9 °C)-103 °F (39.4 °C)] 100.2 °F (37.9 °C)  Pulse:  [110-124] 110  Resp:  [21-29] 29  SpO2:  [99 %-100 %] 100 %  BP: (175-213)/(106-139) 175/111     Weight: 52.6 kg (116 lb)  Body mass index is 19.3 kg/m².    Physical Exam   Constitutional: She is oriented to person, place, and time. She appears well-developed and well-nourished. No distress.   HENT:   Head: Normocephalic and atraumatic.   Mouth/Throat: No oropharyngeal exudate.   Eyes: Conjunctivae and EOM are normal. Pupils are equal, round, and reactive to light. No scleral icterus.   Neck: Normal range of motion. Neck supple. No tracheal deviation present. No  thyromegaly present.   Cardiovascular: Regular rhythm, normal heart sounds and intact distal pulses. Tachycardia present.   No murmur heard.  Pulmonary/Chest: Effort normal. No respiratory distress. She has no wheezes. She has rales (LMLF). She exhibits no tenderness.   Abdominal: Soft. Bowel sounds are normal. She exhibits no distension. There is no tenderness. There is no rebound and no guarding.   Musculoskeletal: Normal range of motion. She exhibits no edema or tenderness.   L AVF, good thrill, non-erythematous, non-tender, +warmth   Lymphadenopathy:     She has no cervical adenopathy.   Neurological: She is alert and oriented to person, place, and time. No cranial nerve deficit.   Skin: Skin is warm and dry. No rash noted. She is not diaphoretic. No erythema. No pallor.   Psychiatric: She has a normal mood and affect. Her behavior is normal. Judgment and thought content normal.         CRANIAL NERVES     CN III, IV, VI   Pupils are equal, round, and reactive to light.  Extraocular motions are normal.        Significant Labs:   CBC:   Recent Labs   Lab  08/31/18   1515   WBC  7.68   HGB  7.8*   HCT  23.4*   PLT  46*     CMP:   Recent Labs   Lab  08/31/18   1515   NA  131*   K  3.9   CL  98   CO2  21*   GLU  120*   BUN  51*   CREATININE  10.1*   CALCIUM  8.8   PROT  7.4   ALBUMIN  2.7*   BILITOT  1.1*   ALKPHOS  560*   AST  74*   ALT  58*   ANIONGAP  12   EGFRNONAA  4.7*     Lactic Acid:   Recent Labs   Lab  08/31/18   1515   LACTATE  1.2       Significant Imaging: I have reviewed and interpreted all pertinent imaging results/findings within the past 24 hours.       Assessment/Plan:     * Sepsis    Presumed source is lung vs pelvic (given history of recent instrumentation, packing, and foul-smelling discharge).   Radiologist interpretation of CXR is LLL infiltrate suspicious for aspiration/pneumonia. However, patient denies any cough, productive sputum, dyspnea, pleuritic pain.   LA wnl (x2), PCT elevated >6,  SIRS 3/4.   Sepsis protocol initiated. Received 30ml/kg in ED. Started on Cefepime, Vanco.   I have ordered Cefepime to be given Tu/Th/Sa after HD. This may need to be adjusted depending on when patient is dialyzed. I've also ordered vanc trough in am.   BCx are pending.   I spoke with Gynecology resident on call about case and they will perform pelvic exam in am. Consult placed.           Pneumonia of right lower lobe due to infectious organism    See plan under sepsis          Hypertensive emergency    Presented to ED with systolic BP of 213. Received 1.5L IVF. Missed HD yesterday. Questionable compliance with extensive HTN regimen.   Will aim for ~20-30% reduction in SBP (<170).   Restarted all HTN meds: clonidine, labetalol, hydralazine, nifedipine, irbesartan.   Nephrology consulted for iHD, though clinically, she does not appear significantly volume overloaded.           Thrombocytopenia    Chronic, continue to monitor. No current indication for transfusion. Holding anticoagulation.           Secondary hyperparathyroidism    Continue cinacalcet          Immunosuppressed    Continuing Tacro; holding Cellcept/Prednisone.   Hepatology consulted.           Anemia in ESRD (end-stage renal disease)    Hg/Hct 7.8/23.4, stable. No current indication for transfusion. Continue to monitor. Holding anticoagulation/ppx due to recent h/o bleeding, thrombocytopenia.   Type and screen          Renovascular hypertension    Historically uncontrolled. SBP currently >200s. Questionable compliance.   Will resume clonidine, nifedipine, labetalol, hydralazine, irbesartan.   Volume removal via iHD will help.           ESRD on hemodialysis    Missed HD Thursday, last dialyzed 8/28.   No emergent need for HD at this time.   Nephrology consulted for iHD  Continue cinacalcet for 2° hyperparathyroidism   Renal diet            Liver replaced by transplant    Hepatology consulted for assistance in managing patient's immunosuppressants.    Will continue Tacro for now; 8mg in AM, 7mg in PM. Holding prednisone in setting of infection. Holding Cellcept.             VTE Risk Mitigation (From admission, onward)        Ordered     Place sequential compression device  Until discontinued      08/31/18 2123     Place LINDA hose  Until discontinued      08/31/18 2123     Reason for No Pharmacological VTE Prophylaxis  Once      08/31/18 2123     IP VTE HIGH RISK PATIENT  Once      08/31/18 2123             Savanah Pickens MD  Department of Hospital Medicine   [facility]

## 2018-09-01 NOTE — ASSESSMENT & PLAN NOTE
27 year old female with a history of ESRD on HD and s/p LTx in 1992 for a hemangioendotheliamo with history of chronic rejection currently admitted with sepsis possibly 2/2 pneumonia. Hepatology is being consulted for management of immunosuppression.     During recent hospitalization patient's prograf level was stable on program 7/8 mg. Recent prograf level post-hospitalization <1.5, which likely indicates non-compliance. Will hold prednisone int he setting of acute sepsis.     Recommendations:  --Daily CMP, CBC, and INR  --Daily Tacrolimus  --Continue tacrolimus 7/8 current dose  --hold prednisone for now

## 2018-09-01 NOTE — PLAN OF CARE
Problem: Patient Care Overview  Goal: Plan of Care Review  Outcome: Ongoing (interventions implemented as appropriate)   09/01/18 6671   Coping/Psychosocial   Plan Of Care Reviewed With patient       Pt ambulating ad russ in room, AOx 4, VSS per flowsheet, dialysis today, 200cc off, blood cultures drawn by lab , rationale explained to pt, pt denies pain, tolerating meals, POC reviewed, will continue to monitor.

## 2018-09-01 NOTE — SUBJECTIVE & OBJECTIVE
Review of Systems   Constitutional: Positive for fatigue and fever. Negative for activity change, appetite change and chills.   HENT: Negative for trouble swallowing.    Gastrointestinal: Negative for abdominal distention, abdominal pain, anal bleeding, blood in stool, constipation, diarrhea, nausea, rectal pain and vomiting.   Genitourinary: Negative for difficulty urinating and dysuria.   Musculoskeletal: Negative for arthralgias and back pain.   Skin: Negative for color change and pallor.   Neurological: Negative for dizziness and headaches.   Psychiatric/Behavioral: Negative for agitation, behavioral problems and confusion.       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 ()    Moderate protein-calorie malnutrition 2017    MRSA bacteremia 2017    Pneumonia     Prophylactic immunotherapy 2014    Renovascular hypertension 10/2/2015    Secondary hyperparathyroidism 2017    Seizures     Sialadenitis 3/21/2018    Thrombocytopenia 2016       Past Surgical History:   Procedure Laterality Date     SECTION      x 2    LIVER BIOPSY      LIVER TRANSPLANT  1992    TUBAL LIGATION         Family history of liver disease: No    Review of patient's allergies indicates:   Allergen Reactions    Chloral hydrate      Other reaction(s): Hallucinations  Other reaction(s): Hives    Hydrocodone Other (See  Comments)     Mental status changes       Tobacco Use    Smoking status: Never Smoker    Smokeless tobacco: Never Used   Substance and Sexual Activity    Alcohol use: No    Drug use: No    Sexual activity: No     Partners: Male       Medications Prior to Admission   Medication Sig Dispense Refill Last Dose    acetaminophen-codeine 300-30mg (TYLENOL #3) 300-30 mg Tab Take 1 tablet by mouth every 6 (six) hours as needed. 10 tablet 0 Taking    aspirin 81 MG Chew Take 1 tablet (81 mg total) by mouth once daily.  0 Taking    cinacalcet (SENSIPAR) 30 MG Tab Take 1 tablet (30 mg total) by mouth daily with breakfast. (Patient taking differently: Take 30 mg by mouth. On Tuesday, Thursday, and Saturday with dialysis) 30 tablet 11 Taking    citalopram (CELEXA) 20 MG tablet TAKE 1 TABLET BY MOUTH EVERY DAY 30 tablet 1 Taking    clindamycin (CLINDESSE) 2 % vaginal cream Place 1 full applicator nightly 40 g 3 Taking    cloNIDine 0.3 mg/24 hr td ptwk (CATAPRES) 0.3 mg/24 hr Place 1 patch onto the skin every 7 days. 4 patch 11 Taking    conjugated estrogens (PREMARIN) vaginal cream Regimen: 2g nightly for 1 week then 1g nightly for 1 week, then 0.5g M/W/F nights 30 g 11 Taking    famotidine (PEPCID) 40 MG tablet Take 0.5 tablets (20 mg total) by mouth once daily. 15 tablet 11 Taking    food supplemt, lactose-reduced (ENSURE ACTIVE HIGH PROTEIN) Liqd Take 236 mLs by mouth 2 (two) times daily. 60 Can 6 Taking    hydrALAZINE (APRESOLINE) 100 MG tablet Take 1 tablet (100 mg total) by mouth every 12 (twelve) hours. 60 tablet 11 Taking    irbesartan (AVAPRO) 300 MG tablet Take 1 tablet (300 mg total) by mouth once daily. 90 tablet 3 Taking    labetalol (NORMODYNE) 100 MG tablet Take 5 tablets (500 mg total) by mouth every 12 (twelve) hours. 300 tablet 11 Taking    mycophenolate (CELLCEPT) 250 mg Cap Take 1,000 mg by mouth 2 (two) times daily.   Taking    NIFEdipine (PROCARDIA-XL) 60 MG (OSM) 24 hr tablet Take 2 tablets  (120 mg total) by mouth once daily. 60 tablet 11     ondansetron (ZOFRAN) 4 MG tablet Take 1 tablet (4 mg total) by mouth every 6 (six) hours as needed for Nausea. (Patient taking differently: Take 4 mg by mouth once daily. ) 15 tablet 0 Taking    predniSONE (DELTASONE) 1 MG tablet Take 1 mg by mouth every other day.   Taking    tacrolimus (PROGRAF) 1 MG Cap Take 8 capsules (8mg) in the morning and 7 capsules (7mg) in the evening 450 capsule 0 Taking    traZODone (DESYREL) 50 MG tablet Take 1 tablet (50 mg total) by mouth nightly as needed for Insomnia. 30 tablet 3     triamcinolone acetonide 0.1% (KENALOG) 0.1 % ointment AAA on arms, legs, and neck bid x 1-2 wks then prn flares only (Patient taking differently: Apply to affected area(s) on arms, legs, and neck twice daily x 1-2 wks then as needed for flares only) 80 g 3 Taking       Objective:     Vital Signs (Most Recent):  Temp: 99 °F (37.2 °C) (09/01/18 1515)  Pulse: 97 (09/01/18 1515)  Resp: 18 (09/01/18 1515)  BP: 134/79 (09/01/18 1515)  SpO2: 95 % (09/01/18 0758) Vital Signs (24h Range):  Temp:  [98 °F (36.7 °C)-101.7 °F (38.7 °C)] 99 °F (37.2 °C)  Pulse:  [] 97  Resp:  [16-29] 18  SpO2:  [95 %-100 %] 95 %  BP: (102-200)/() 134/79     Weight: 50.9 kg (112 lb 3.4 oz) (08/31/18 2230)  Body mass index is 18.67 kg/m².    Physical Exam   Constitutional: She is oriented to person, place, and time. She appears well-developed.   HENT:   Head: Normocephalic.   Eyes: Conjunctivae are normal. Pupils are equal, round, and reactive to light. No scleral icterus.   Neck: Normal range of motion. Neck supple.   Cardiovascular: Normal rate and regular rhythm.   Pulmonary/Chest: Effort normal and breath sounds normal.   Abdominal: Soft. Bowel sounds are normal. She exhibits no distension and no mass. There is no tenderness. There is no guarding.   Musculoskeletal: Normal range of motion. She exhibits no edema.   Neurological: She is alert and oriented to  person, place, and time.   Skin: Skin is warm and dry.   Psychiatric: She has a normal mood and affect.       MELD-Na score: 21 at 8/9/2018  4:40 AM  MELD score: 21 at 8/9/2018  4:40 AM  Calculated from:  Serum Creatinine: 0  Serum Sodium: 137 mmol/L at 8/9/2018  4:40 AM  Total Bilirubin: 0.7 mg/dL (Rounded to 1 mg/dL) at 8/9/2018  4:40 AM  INR(ratio): 1.1 at 8/7/2018  8:06 AM  Age: 27 years    Significant Labs:  CBC:   Recent Labs   Lab  09/01/18   1121   WBC  5.72   RBC  2.37*   HGB  6.6*   HCT  19.8*   PLT  40*     BMP:   Recent Labs   Lab  09/01/18   0745   GLU  102   NA  133*   K  4.4   CL  103   CO2  18*   BUN  63*   CREATININE  10.8*   CALCIUM  8.1*     CMP:   Recent Labs   Lab  08/31/18   1515  09/01/18   0745   GLU  120*  102   CALCIUM  8.8  8.1*   ALBUMIN  2.7*  2.2*   PROT  7.4   --    NA  131*  133*   K  3.9  4.4   CO2  21*  18*   CL  98  103   BUN  51*  63*   CREATININE  10.1*  10.8*   ALKPHOS  560*   --    ALT  58*   --    AST  74*   --    BILITOT  1.1*   --      Coagulation: No results for input(s): PT, INR, APTT in the last 168 hours.    Significant Imaging:  Labs: Reviewed

## 2018-09-01 NOTE — HPI
Holly Patel is a 27 y.o.  with PMHx significant for liver transplant on chronic immunosuppression, poorly controlled HTN, ESRD on HD, and known non-compliance who presents in the ED with fever to 104 at home and concern for sepsis by primary team. Ms. Patel is well-known to GYN service. Patient had a LEEP on 6/15/18 and has had intermittent issues with bleeding since then. Postop course as follows:    -  --Presented with postop bleeding. She had EUA with cervical artery ligation and oversewing of anterior edge of LEEP bed. She received 1 pack platelets and 1u pRBC.   -  --[place] for [place]N emergency [place] [facility] [place]. Clonidine patch added for BP.  -  --Heavy bleeding which was managed with monsels and vaginal packing, as her BP was too high to safely operate. BP managed with cardine gtt and lisinopril added.   -  --Admitted for symptomatic anemia requiring 4u pRBC. Bleeding stable, did not require surgical management. Procardia increased.   - -10 --Presented with ongoing bleeding, managed with EUA with cervical artery ligation and running locked sutures around LEEP perimeter anteriorly and posteriorly. Received 2u pRBCs. Continued on home meds which controlled BP once taking.  - - --Bleeding managed with monsels packing and UAE per IR on . Received 4u pRBC for symptomatic anemia. Resuming home medication regimen and receiving HD controlled BP.  -  --Bleeding managed with EUA with modified Sturmdorf sutures and premarin cream for vaginal atrophy. Also prescribed vaginal clindamycin which she did not pickup from the pharmacy. Resuming home medication regimen and receiving HD controlled BP.  - - - underwent 2 EUAs for vaginal bleeding and infection, Sturmdorf sutures placed with good hemostasis      Gynecology consulted for evaluation of vaginitis/cervix as source of infection. Previously placed on  flagyl and cleocin for desquamative vaginitis and concern for infection.

## 2018-09-01 NOTE — ASSESSMENT & PLAN NOTE
Missed HD Thursday, last dialyzed 8/28.   No emergent need for HD at this time.   Nephrology consulted for iHD  Continue cinacalcet for 2° hyperparathyroidism   Renal diet

## 2018-09-01 NOTE — ASSESSMENT & PLAN NOTE
"Ms. Patel is a 28 y/o female with ESRD (on HD MWF), h/o liver transplant (on immunosuppression with prograf and prednisone), poorly controlled HTN with medication non-compliance, diastolic HF, and anemia from chronic kidney disease and blood loss (s/p LEEP). She presented to the ED today for fever.  Patient states last night she had fever T-max of 104.3°  and attempted to take Children's Tylenol with no relief of symptoms. Patient states today her fever was 104°. Patient endorses generalized body aches that she awoke with yesterday but denies any cough, congestion, shortness of breath or chest pain. She is anuric.      Notably, she had a LEEP procedure in June 2018 which was complicated by persistent vaginal bleeding and multiple admissions for anemia. Per previous d/c summ: "On 8/8, they were able to perform an EUA where they noted "cervix visualized with clot in place, no active bleeding. Sutures from prior surgeries remain visible and intact. Insufficient cervix for Eddoloop. Monsel's and packing placed."  Packing was subsequently removed on POD 3. Since discharge (8/16) patient reports that the bleeding has stopped. She has not had any pelvic pain. She does report some black dry discharge with a foul odor.      Patient with fevers and chills but no sinus drainage, cough, N V D, hodge snot make any urine, no skin breakdown.  Just on dialysis and the graft worked fine.  No abd pain with recent LEEP and fibroid embolization     Patient with 4/4 BC + GPC likely staph - on vanc and cefepime dosed with dialysis.  Would keep on these meds for now -    1. Await ID of GPC and await if any other cultures grow out   2. Keep on vanc and cefepime for now - will likely be able to stop cefepime soon   3. ID will order repeat BC    ID will follow with you  "

## 2018-09-01 NOTE — CONSULTS
"[facility]  Infectious Disease  Consult Note    Patient Name: Holly Patel  MRN: [id number]  Admission Date: 8/31/2018  Hospital Length of Stay: 1 days  Attending Physician: Alexander Chicas MD  Primary Care Provider: Stan Sosa MD     Isolation Status: No active isolations    Patient information was obtained from patient and ER records.      Consults  Assessment/Plan:     * Sepsis    Ms. Patel is a 26 y/o female with ESRD (on HD MWF), h/o liver transplant (on immunosuppression with prograf and prednisone), poorly controlled HTN with medication non-compliance, diastolic HF, and anemia from chronic kidney disease and blood loss (s/p LEEP). She presented to the ED today for fever.  Patient states last night she had fever T-max of 104.3°  and attempted to take Children's Tylenol with no relief of symptoms. Patient states today her fever was 104°. Patient endorses generalized body aches that she awoke with yesterday but denies any cough, congestion, shortness of breath or chest pain. She is anuric.      Notably, she had a LEEP procedure in June 2018 which was complicated by persistent vaginal bleeding and multiple admissions for anemia. Per previous d/c summ: "On 8/8, they were able to perform an EUA where they noted "cervix visualized with clot in place, no active bleeding. Sutures from prior surgeries remain visible and intact. Insufficient cervix for Eddoloop. Monsel's and packing placed."  Packing was subsequently removed on POD 3. Since discharge (8/16) patient reports that the bleeding has stopped. She has not had any pelvic pain. She does report some black dry discharge with a foul odor.      Patient with fevers and chills but no sinus drainage, cough, N V D, hodge snot make any urine, no skin breakdown.  Just on dialysis and the graft worked fine.  No abd pain with recent LEEP and fibroid embolization     Patient with 4/4 BC + GPC likely staph - on vanc and cefepime dosed with " "dialysis.  Would keep on these meds for now -    1. Await ID of GPC and await if any other cultures grow out   2. Keep on vanc and cefepime for now - will likely be able to stop cefepime soon   3. ID will order repeat BC    ID will follow with you            Thank you for your consult. I will follow-up with patient. Please contact us if you have any additional questions.    Philip Mayorga MD  Infectious Disease  [facility]    Subjective:     Principal Problem: Sepsis    HPI: Ms. Patel is a 28 y/o female with ESRD (on HD MWF), h/o liver transplant (on immunosuppression with prograf and prednisone), poorly controlled HTN with medication non-compliance, diastolic HF, and anemia from chronic kidney disease and blood loss (s/p LEEP). She presented to the ED today for fever.  Patient states last night she had fever T-max of 104.3°  and attempted to take Children's Tylenol with no relief of symptoms. Patient states today her fever was 104°. Patient endorses generalized body aches that she awoke with yesterday but denies any cough, congestion, shortness of breath or chest pain. She is anuric.      Notably, she had a LEEP procedure in June 2018 which was complicated by persistent vaginal bleeding and multiple admissions for anemia. Per previous d/c summ: "On 8/8, they were able to perform an EUA where they noted "cervix visualized with clot in place, no active bleeding. Sutures from prior surgeries remain visible and intact. Insufficient cervix for Eddoloop. Monsel's and packing placed."  Packing was subsequently removed on POD 3. Since discharge (8/16) patient reports that the bleeding has stopped. She has not had any pelvic pain. She does report some black dry discharge with a foul odor.      Patient with fevers and chills but no sinus drainage, cough, N V D, hodge snot make any urine, no skin breakdown.  Just on dialysis and the graft worked fine.  No abd pain with recent LEEP and fibroid " embolization         Past Medical History:   Diagnosis Date    Anemia in ESRD (end-stage renal disease) 10/12/2015    dialysis tues, thursday, sat; access left arm    Chronic rejection of liver transplant 3/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 ()    Moderate protein-calorie malnutrition 2017    MRSA bacteremia 2017    Pneumonia     Prophylactic immunotherapy 2014    Renovascular hypertension 10/2/2015    Secondary hyperparathyroidism 2017    Seizures     Sialadenitis 3/21/2018    Thrombocytopenia 2016       Past Surgical History:   Procedure Laterality Date     SECTION      x 2    LIVER BIOPSY      LIVER TRANSPLANT  1992    TUBAL LIGATION         Review of patient's allergies indicates:   Allergen Reactions    Chloral hydrate      Other reaction(s): Hallucinations  Other reaction(s): Hives    Hydrocodone Other (See Comments)     Mental status changes       Medications:  Medications Prior to Admission   Medication Sig    acetaminophen-codeine 300-30mg (TYLENOL #3) 300-30 mg Tab Take 1 tablet by mouth every 6 (six) hours as needed.    aspirin 81 MG Chew Take 1 tablet (81 mg total) by mouth once daily.    cinacalcet (SENSIPAR) 30 MG Tab Take 1 tablet (30 mg total) by mouth daily with breakfast. (Patient taking differently: Take 30 mg by mouth. On Tuesday, Thursday, and Saturday with dialysis)    citalopram (CELEXA) 20 MG tablet TAKE 1 TABLET BY MOUTH EVERY DAY    clindamycin (CLINDESSE) 2 % vaginal cream Place 1 full applicator nightly    cloNIDine 0.3 mg/24 hr  td ptwk (CATAPRES) 0.3 mg/24 hr Place 1 patch onto the skin every 7 days.    conjugated estrogens (PREMARIN) vaginal cream Regimen: 2g nightly for 1 week then 1g nightly for 1 week, then 0.5g M/W/F nights    famotidine (PEPCID) 40 MG tablet Take 0.5 tablets (20 mg total) by mouth once daily.    food supplemt, lactose-reduced (ENSURE ACTIVE HIGH PROTEIN) Liqd Take 236 mLs by mouth 2 (two) times daily.    hydrALAZINE (APRESOLINE) 100 MG tablet Take 1 tablet (100 mg total) by mouth every 12 (twelve) hours.    irbesartan (AVAPRO) 300 MG tablet Take 1 tablet (300 mg total) by mouth once daily.    labetalol (NORMODYNE) 100 MG tablet Take 5 tablets (500 mg total) by mouth every 12 (twelve) hours.    mycophenolate (CELLCEPT) 250 mg Cap Take 1,000 mg by mouth 2 (two) times daily.    NIFEdipine (PROCARDIA-XL) 60 MG (OSM) 24 hr tablet Take 2 tablets (120 mg total) by mouth once daily.    ondansetron (ZOFRAN) 4 MG tablet Take 1 tablet (4 mg total) by mouth every 6 (six) hours as needed for Nausea. (Patient taking differently: Take 4 mg by mouth once daily. )    predniSONE (DELTASONE) 1 MG tablet Take 1 mg by mouth every other day.    tacrolimus (PROGRAF) 1 MG Cap Take 8 capsules (8mg) in the morning and 7 capsules (7mg) in the evening    traZODone (DESYREL) 50 MG tablet Take 1 tablet (50 mg total) by mouth nightly as needed for Insomnia.    triamcinolone acetonide 0.1% (KENALOG) 0.1 % ointment AAA on arms, legs, and neck bid x 1-2 wks then prn flares only (Patient taking differently: Apply to affected area(s) on arms, legs, and neck twice daily x 1-2 wks then as needed for flares only)     Antibiotics (From admission, onward)    None        Antifungals (From admission, onward)    None        Antivirals (From admission, onward)    None           Immunization History   Administered Date(s) Administered    Influenza - Quadrivalent - PF 10/12/2015    PPD Test 09/17/2015, 10/02/2015    Pneumococcal Conjugate - 13  Roma 10/05/2017       Family History     Problem Relation (Age of Onset)    Hypertension Mother, Father        Social History     Socioeconomic History    Marital status: Legally      Spouse name: None    Number of children: None    Years of education: None    Highest education level: None   Social Needs    Financial resource strain: None    Food insecurity - worry: None    Food insecurity - inability: None    Transportation needs - medical: None    Transportation needs - non-medical: None   Occupational History    None   Tobacco Use    Smoking status: Never Smoker    Smokeless tobacco: Never Used   Substance and Sexual Activity    Alcohol use: No    Drug use: No    Sexual activity: No     Partners: Male   Other Topics Concern    Are you pregnant or think you may be? No    Breast-feeding No   Social History Narrative    Lives 2 kids, 7 and 8, and nephew. Her mom helps with kids.     Review of Systems   Constitutional: Positive for chills, fatigue and fever.   HENT: Negative.    Eyes: Negative.    Respiratory: Negative.    Gastrointestinal: Negative.    Endocrine: Negative.    Genitourinary: Negative.    Musculoskeletal: Negative.    Allergic/Immunologic: Negative.    Neurological: Negative.    Hematological: Negative.    Psychiatric/Behavioral: Negative.      Objective:     Vital Signs (Most Recent):  Temp: 99 °F (37.2 °C) (09/01/18 1545)  Pulse: 106 (09/01/18 1616)  Resp: 18 (09/01/18 1600)  BP: (!) 154/96 (09/01/18 1600)  SpO2: 95 % (09/01/18 0758) Vital Signs (24h Range):  Temp:  [98 °F (36.7 °C)-101.7 °F (38.7 °C)] 99 °F (37.2 °C)  Pulse:  [] 106  Resp:  [16-29] 18  SpO2:  [95 %-100 %] 95 %  BP: (102-200)/() 154/96     Weight: 50.9 kg (112 lb 3.4 oz)  Body mass index is 18.67 kg/m².    Estimated Creatinine Clearance: 6.3 mL/min (A) (based on SCr of 10.8 mg/dL (H)).    Physical Exam   Constitutional: She is oriented to person, place, and time. She appears well-developed and  well-nourished.   HENT:   Head: Normocephalic and atraumatic.   Eyes: Pupils are equal, round, and reactive to light.   Neck: Normal range of motion. Neck supple.   Cardiovascular: Normal rate and regular rhythm.   Pulmonary/Chest: Effort normal and breath sounds normal.   Abdominal: Soft. Bowel sounds are normal.   Musculoskeletal: Normal range of motion.   Neurological: She is alert and oriented to person, place, and time.   Skin: Skin is warm and dry.   Dialysis access on left forearm no pain or erythema        Significant Labs: All pertinent labs within the past 24 hours have been reviewed.    Significant Imaging: I have reviewed all pertinent imaging results/findings within the past 24 hours.

## 2018-09-01 NOTE — PROGRESS NOTES
Holly Patel is a 27 y.o. female who our service has been consulted to evaluate and give opinion.     History of Present Illness: PMHx significant for ESRD on HD (T/T/S), liver transplant on chronic immunosuppression, poorly controlled HTN and known non-compliance who presents in the ED with fever to 104 at home and concern for sepsis by primary team.  Now admitted for temp 104. No n/v/d, no cough or SOB. No throat ache.           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 ()    Moderate protein-calorie malnutrition 2017    MRSA bacteremia 2017    Pneumonia     Prophylactic immunotherapy 2014    Renovascular hypertension 10/2/2015    Secondary hyperparathyroidism 2017    Seizures     Sialadenitis 3/21/2018    Thrombocytopenia 2016       Past Surgical History:   Procedure Laterality Date     SECTION      x 2    LIVER BIOPSY      LIVER TRANSPLANT  1992    TUBAL LIGATION  2010       Family History   Problem Relation Age of Onset    Hypertension Mother     Hypertension Father     Melanoma Neg Hx     Breast cancer Neg Hx     Colon cancer Neg Hx     Ovarian cancer Neg Hx        Review of patient's allergies indicates:   Allergen Reactions    Chloral hydrate      Other reaction(s): Hallucinations  Other reaction(s): Hives    Hydrocodone Other (See Comments)     Mental status changes        Current Facility-Administered Medications   Medication Dose Route Frequency Provider Last Rate Last Dose    0.9%  NaCl infusion (for blood administration)   Intravenous Q24H PRN Alexander Chicas MD        0.9%  NaCl infusion   Intravenous Once Petr Leon MD        acetaminophen tablet 650 mg  650 mg Oral Q8H PRN Savanah Pickens MD   650 mg at 08/31/18 2245    aspirin chewable tablet 81 mg  81 mg Oral Daily Savanah Pickens MD   81 mg at 09/01/18 0851    cinacalcet tablet 30 mg  30 mg Oral Every Tues, Thurs, Sat Savanah Pickens MD        citalopram tablet 20 mg  20 mg Oral Daily Savanah Pickens MD   20 mg at 09/01/18 0851    cloNIDine 0.3 mg/24 hr td ptwk 1 patch  1 patch Transdermal Q7 Days Savanah Pickens MD        famotidine tablet 20 mg  20 mg Oral Daily Savanah Pickens MD   20 mg at 09/01/18 0851    hydrALAZINE tablet 100 mg  100 mg Oral Q12H Savanah Pickens MD   100 mg at 09/01/18 0851    irbesartan tablet 300 mg  300 mg Oral Daily Savanah Pickens MD   300 mg at 09/01/18 0852    labetalol tablet 500 mg  500 mg Oral Q12H Savanah Pickens MD   500 mg at 09/01/18 0852    NIFEdipine 24 hr tablet 120 mg  120 mg Oral Daily Savanah Pickens MD   120 mg at 09/01/18 0851    ondansetron disintegrating tablet 8 mg  8 mg Oral Q8H PRN Savanah Pickens MD   8 mg at 08/31/18 2244    sodium chloride 0.9% flush 5 mL  5 mL Intravenous PRN Alexander Chicas MD        tacrolimus capsule 8 mg  8 mg Oral Daily Savanah Pickens MD   8 mg at 09/01/18 0851    And    tacrolimus capsule 7 mg  7 mg Oral Daily Savanah Picknes MD           Review of Systems:    Patient has no visual changes, chest pain, edema, cough, dyspnea, nausea, vomiting, constipation, diarrhea, arthralgias, pruritis, dizziness, depression, confusion.    Physical Exam:    Temp:  [98 °F (36.7 °C)-102.5 °F (39.2 °C)] 98 °F  (36.7 °C)  Pulse:  [] 93  Resp:  [16-29] 18  SpO2:  [95 %-100 %] 95 %  BP: (102-200)/() 111/60      Intake/Output Summary (Last 24 hours) at 9/1/2018 1449  Last data filed at 8/31/2018 2251  Gross per 24 hour   Intake 2078 ml   Output --   Net 2078 ml           Gen: WDWN female in no apparent distress  Psych: Normal mood and affect  Skin: No rashes or ulcers  Eyes: Normal conjunctiva and lids, PERRLA  ENT: Normal hearing with no oropharyngeal lesions  Neck: No JVD, no lymphadenopathy  Chest: Clear with no rales, rhonchi, wheezing with normal effort  CV: Discrete systolic murmur, no gallops or rubs  Abd: Soft, nontender, no distension, positive bowel sounds  Ext: No cyanosis, clubbing or edema    Recent Labs   Lab  08/31/18   1515  09/01/18   0745   NA  131*  133*   K  3.9  4.4   CL  98  103   CO2  21*  18*   BUN  51*  63*   CREATININE  10.1*  10.8*   CALCIUM  8.8  8.1*   PHOS  4.1  4.7*         Recent Labs   Lab  08/31/18   1515  09/01/18   1121   WBC  7.68  5.72   HGB  7.8*  6.6*   HCT  23.4*  19.8*   PLT  46*  40*       Impression/Plan:      1. ESRD: HD T/T/S: dialyze today. Was hypotensive in the HD unit, received all her blood pressure medication prior HD.  - will try to remove 500cc  - will continue to dialyze her on her regular schedule.   2. Anemia of Chronic Disease: Significant drop in her H/H: will receive one unit of PRBC during HD, continue epo,     3. Metabolic bone disasese: continue renal diet, sensipar     4. HTN: came in hypertensive, now with lowish blood pressure after receiving all her home medication.

## 2018-09-01 NOTE — ASSESSMENT & PLAN NOTE
Hepatology consulted for assistance in managing patient's immunosuppressants.   Will continue Tacro for now; 8mg in AM, 7mg in PM. Holding prednisone in setting of infection. Holding Cellcept.

## 2018-09-01 NOTE — PROGRESS NOTES
HD treatment complete. Duration of treatment 3 hours and 200cc removed. Treatment was tolerated well and no complications with access to left lower arm. Needles removed and hemostasis achieved. Dressing intact and no drainage noted. Thrill and bruit present. 1 Unit of PRBCs given during treatment and was tolerated well. No complaints of dizziness at this time.

## 2018-09-01 NOTE — CONSULTS
[facility]  Obstetrics & Gynecology  Consult Note    Patient Name: Holly Patel  MRN: [id number]  Admission Date: 2018  Hospital Length of Stay: 1 days  Code Status: Full Code  Primary Care Provider: Stan Sosa MD  Principal Problem: Sepsis    Consults  Subjective:     Chief Complaint: sepsis    History of Present Illness:  Holly Patel is a 27 y.o.  with PMHx significant for liver transplant on chronic immunosuppression, poorly controlled HTN, ESRD on HD, and known non-compliance who presents in the ED with fever to 104 at home and concern for sepsis by primary team. Ms. Patel is well-known to GYN service. Patient had a LEEP on 6/15/18 and has had intermittent issues with bleeding since then. Postop course as follows:    -  --Presented with postop bleeding. She had EUA with cervical artery ligation and oversewing of anterior edge of LEEP bed. She received 1 pack platelets and 1u pRBC.   -  --Admission for HTN emergency managed with cardine gtt at [facility], bleeding remained stable. Clonidine patch added for BP.  -  --Heavy bleeding which was managed with monsels and vaginal packing, as her BP was too high to safely operate. BP managed with cardine gtt and lisinopril added.   -  --Admitted for symptomatic anemia requiring 4u pRBC. Bleeding stable, did not require surgical management. Procardia increased.   - 7/9-10 --Presented with ongoing bleeding, managed with EUA with cervical artery ligation and running locked sutures around LEEP perimeter anteriorly and posteriorly. Received 2u pRBCs. Continued on home meds which controlled BP once taking.  -  --Bleeding managed with monsels packing and UAE per IR on . Received 4u pRBC for symptomatic anemia. Resuming home medication regimen and receiving HD controlled BP.  -  --Bleeding managed with EUA with modified Sturmdorf sutures and premarin cream for vaginal atrophy. Also  prescribed vaginal clindamycin which she did not pickup from the pharmacy. Resuming home medication regimen and receiving HD controlled BP.  - - - underwent 2 EUAs for vaginal bleeding and infection, Sturmdorf sutures placed with good hemostasis      Gynecology consulted for evaluation of vaginitis/cervix as source of infection. Previously placed on flagyl and cleocin for desquamative vaginitis and concern for infection.         Obstetric History       T0      L2     SAB0   TAB0   Ectopic0   Multiple0   Live Births2       # Outcome Date GA Lbr Peter/2nd Weight Sex Delivery Anes PTL Lv   2       CS-LTranv   JOANN   1       CS-LTranv   JOANN        Past Medical History:   Diagnosis Date    Anemia in ESRD (end-stage renal disease) 10/12/2015    dialysis tues, thursday, sat; access left arm    Chronic rejection of liver transplant 3/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 ()    Moderate protein-calorie malnutrition 2017    MRSA bacteremia 2017    Pneumonia     Prophylactic immunotherapy 2014    Renovascular hypertension 10/2/2015    Secondary hyperparathyroidism 2017    Seizures     Sialadenitis 3/21/2018    Thrombocytopenia 2016     Past Surgical History:   Procedure Laterality Date     SECTION      x 2    LIVER BIOPSY      LIVER TRANSPLANT  1992    TUBAL LIGATION         PTA Medications   Medication Sig    acetaminophen-codeine 300-30mg (TYLENOL #3) 300-30 mg Tab Take 1 tablet by mouth every 6 (six) hours as needed.    aspirin 81 MG Chew  Take 1 tablet (81 mg total) by mouth once daily.    cinacalcet (SENSIPAR) 30 MG Tab Take 1 tablet (30 mg total) by mouth daily with breakfast. (Patient taking differently: Take 30 mg by mouth. On Tuesday, Thursday, and Saturday with dialysis)    citalopram (CELEXA) 20 MG tablet TAKE 1 TABLET BY MOUTH EVERY DAY    clindamycin (CLINDESSE) 2 % vaginal cream Place 1 full applicator nightly    cloNIDine 0.3 mg/24 hr td ptwk (CATAPRES) 0.3 mg/24 hr Place 1 patch onto the skin every 7 days.    conjugated estrogens (PREMARIN) vaginal cream Regimen: 2g nightly for 1 week then 1g nightly for 1 week, then 0.5g M/W/F nights    famotidine (PEPCID) 40 MG tablet Take 0.5 tablets (20 mg total) by mouth once daily.    food supplemt, lactose-reduced (ENSURE ACTIVE HIGH PROTEIN) Liqd Take 236 mLs by mouth 2 (two) times daily.    hydrALAZINE (APRESOLINE) 100 MG tablet Take 1 tablet (100 mg total) by mouth every 12 (twelve) hours.    irbesartan (AVAPRO) 300 MG tablet Take 1 tablet (300 mg total) by mouth once daily.    labetalol (NORMODYNE) 100 MG tablet Take 5 tablets (500 mg total) by mouth every 12 (twelve) hours.    mycophenolate (CELLCEPT) 250 mg Cap Take 1,000 mg by mouth 2 (two) times daily.    NIFEdipine (PROCARDIA-XL) 60 MG (OSM) 24 hr tablet Take 2 tablets (120 mg total) by mouth once daily.    ondansetron (ZOFRAN) 4 MG tablet Take 1 tablet (4 mg total) by mouth every 6 (six) hours as needed for Nausea. (Patient taking differently: Take 4 mg by mouth once daily. )    predniSONE (DELTASONE) 1 MG tablet Take 1 mg by mouth every other day.    tacrolimus (PROGRAF) 1 MG Cap Take 8 capsules (8mg) in the morning and 7 capsules (7mg) in the evening    traZODone (DESYREL) 50 MG tablet Take 1 tablet (50 mg total) by mouth nightly as needed for Insomnia.    triamcinolone acetonide 0.1% (KENALOG) 0.1 % ointment AAA on arms, legs, and neck bid x 1-2 wks then prn flares only (Patient taking differently: Apply to affected  area(s) on arms, legs, and neck twice daily x 1-2 wks then as needed for flares only)       Review of patient's allergies indicates:   Allergen Reactions    Chloral hydrate      Other reaction(s): Hallucinations  Other reaction(s): Hives    Hydrocodone Other (See Comments)     Mental status changes        Family History     Problem Relation (Age of Onset)    Hypertension Mother, Father        Tobacco Use    Smoking status: Never Smoker    Smokeless tobacco: Never Used   Substance and Sexual Activity    Alcohol use: No    Drug use: No    Sexual activity: No     Partners: Male     Review of Systems   Constitutional: Positive for fatigue and fever.   HENT: Negative.    Eyes: Negative.    Respiratory: Negative.    Cardiovascular: Negative.    Gastrointestinal: Negative.    Endocrine: Negative.    Genitourinary: Positive for vaginal discharge.   Musculoskeletal: Negative.    Skin:  Negative.   Neurological: Negative.    Hematological: Negative.    Psychiatric/Behavioral: Negative.    Breast: negative.       Objective:     Vital Signs (Most Recent):  Temp: 98.9 °F (37.2 °C) (09/01/18 0758)  Pulse: 103 (09/01/18 0800)  Resp: 18 (09/01/18 0444)  BP: (!) 198/123 (09/01/18 0800)  SpO2: 95 % (09/01/18 0758) Vital Signs (24h Range):  Temp:  [98.3 °F (36.8 °C)-103 °F (39.4 °C)] 98.9 °F (37.2 °C)  Pulse:  [] 103  Resp:  [18-29] 18  SpO2:  [95 %-100 %] 95 %  BP: (169-213)/(106-139) 198/123     Weight: 50.9 kg (112 lb 3.4 oz)  Body mass index is 18.67 kg/m².    No LMP recorded. Patient is not currently having periods (Reason: Other).    Physical Exam:               Genitourinary:   Genitourinary Comments: Cervix mildly friable, minimal spotting, physiologic discharge. No signs of infection. Bimanual exam without fundal tenderness or CMT.                      Laboratory:  Recent Lab Results       09/01/18  0745 09/01/18  0343 08/31/18  1930 08/31/18  1542 08/31/18  1516      Immature Granulocytes          Immature Grans  (Abs)          Procalcitonin          Albumin 2.2         Alkaline Phosphatase          ALT          Anion Gap 12         AST          Baso #          Basophil%          Total Bilirubin          Blood Culture, Routine    Gram stain sachin bottle: Gram positive cocci in clusters resembling Staph [P] Gram stain aer bottle: Gram positive cocci in clusters resembling Staph [P]         Results called to and read back by:Fiordaliza Taylor RN 09/01/2018  08:29[P] Gram stain sachin bottle: Gram positive cocci in clusters resembling Staph [P]         Gram stain aer bottle: Gram positive cocci in clusters resembling Staph [P] Results called to and read back by:Fiordaliza Taylor RN 09/01/2018  08:29[P]         Positive results previously called 09/01/2018  09:08[P]      BUN, Bld 63         Calcium 8.1         Chloride 103         CO2 18         Creatinine 10.8         Differential Method          eGFR if  5.0         eGFR if non  4.4  Comment:  Calculation used to obtain the estimated glomerular filtration  rate (eGFR) is the CKD-EPI equation.            Eos #          Eosinophil%          Glucose 102         Gran # (ANC)          Gran%          Hematocrit          Hemoglobin          Lactate, Saul   0.8  Comment:  Falsely low lactic acid results can be found in samples   containing >=13.0 mg/dL total bilirubin and/or >=3.5 mg/dL   direct bilirubin.         Lymph #          Lymph%          Magnesium          MCH          MCHC          MCV          Mono #          Mono%          MPV          nRBC          Phosphorus 4.7         Platelets          Potassium 4.4         Total Protein          RBC          RDW          Sodium 133         Tacrolimus Lvl  <1.5  Comment:  Testing performed by Liquid Chromatography-Tandem  Mass Spectrometry (LC-MS/MS).  This test was developed and its performance characteristics  determined by [facility], Department of Pathology  and Laboratory Medicine in a manner  consistent with CLIA  requirements. It has not been cleared or approved by the [place]  Food and Drug Administration.  This test is used for clinical   purposes.  It should not be regarded as investigational or for  research.          Vancomycin, Random  37.4        WBC                      08/31/18  1515      Immature Granulocytes 0.7     Immature Grans (Abs) 0.05  Comment:  Mild elevation in immature granulocytes is non specific and   can be seen in a variety of conditions including stress response,   acute inflammation, trauma and pregnancy. Correlation with other   laboratory and clinical findings is essential.       Procalcitonin 6.86  Comment:  Please re-baseline procalcitonin if a patient is transferred from  other facilities to [facility].  A concentration < 0.25 ng/mL represents a low risk bacterial   infection.  Procalcitonin may not be accurate among patients with localized   infection, recent trauma or major surgery, immunosuppressed state,   invasive fungal infection, renal dysfunction. Decisions regarding   initiation or continuation of antibiotic therapy should not be based   solely on procalcitonin levels.       Albumin 2.7     Alkaline Phosphatase 560     ALT 58     Anion Gap 12     AST 74     Baso # 0.02     Basophil% 0.3     Total Bilirubin 1.1  Comment:  For infants and newborns, interpretation of results should be based  on gestational age, weight and in agreement with clinical  observations.  Premature Infant recommended reference ranges:  Up to 24 hours.............<8.0 mg/dL  Up to 48 hours............<12.0 mg/dL  3-5 days..................<15.0 mg/dL  6-29 days.................<15.0 mg/dL       Blood Culture, Routine      BUN, Bld 51     Calcium 8.8     Chloride 98     CO2 21     Creatinine 10.1     Differential Method Automated     eGFR if African American 5.4     eGFR if non  4.7  Comment:  Calculation used to obtain the estimated glomerular filtration  rate (eGFR) is the  CKD-EPI equation.        Eos # 0.1     Eosinophil% 1.8     Glucose 120     Gran # (ANC) 6.6     Gran% 86.1     Hematocrit 23.4     Hemoglobin 7.8     Lactate, Saul 1.2  Comment:  Falsely low lactic acid results can be found in samples   containing >=13.0 mg/dL total bilirubin and/or >=3.5 mg/dL   direct bilirubin.       Lymph # 0.6     Lymph% 7.8     Magnesium 2.1     MCH 28.4     MCHC 33.3     MCV 85     Mono # 0.3     Mono% 3.3     MPV SEE COMMENT  Comment:  Result not available.     nRBC 0     Phosphorus 4.1     Platelets 46     Potassium 3.9     Total Protein 7.4     RBC 2.75     RDW 17.0     Sodium 131     Tacrolimus Lvl      Vancomycin, Random      WBC 7.68           Diagnostic Results:  Labs: Reviewed    Assessment/Plan:     * Sepsis    - Benign GYN exam. No obvious source of infection in vagina or on cervical exam.   - If occult gyn source of infection, patient on appropriate broad spectrum  - Spoke with primary team and gyn onc, will sign off now. Reconsult with any questions            Thank you for your consult. I will sign off. Please contact us if you have any additional questions.    Osmar Ca MD  Obstetrics & Gynecology  [facility]

## 2018-09-01 NOTE — ASSESSMENT & PLAN NOTE
Presented to ED with systolic BP of 213. Received 1.5L IVF. Missed HD yesterday. Questionable compliance with extensive HTN regimen.   Will aim for ~20-30% reduction in SBP (<170).   Restarted all HTN meds: clonidine, labetalol, hydralazine, nifedipine, irbesartan.   Nephrology consulted for iHD, though clinically, she does not appear significantly volume overloaded.

## 2018-09-01 NOTE — ASSESSMENT & PLAN NOTE
- Benign GYN exam. No obvious source of infection in vagina or on cervical exam.   - If occult gyn source of infection, patient on appropriate broad spectrum  - Spoke with primary team and gyn onc, will sign off now. Reconsult with any questions

## 2018-09-01 NOTE — ASSESSMENT & PLAN NOTE
Historically uncontrolled. SBP currently >200s. Questionable compliance.   Will resume clonidine, nifedipine, labetalol, hydralazine, irbesartan.   Volume removal via iHD will help.

## 2018-09-01 NOTE — HOSPITAL COURSE
In the ED, patient was tachycardic in the 130s, febrile 103°, tachypnic. BP was >210 systolic. PCT was elevated at 6.8, Lactic Acid was normal x2. WBC was normal. CBC/CMP were otherwise significant for mildly elevated transaminases, low Hg/Hct (but at baseline), and thrombocytopenia (worse than before). A CXR showed possible LLL consolidation suspicious for PNA. She was started on vanc/cefepime.

## 2018-09-01 NOTE — ASSESSMENT & PLAN NOTE
Hg/Hct 7.8/23.4, stable. No current indication for transfusion. Continue to monitor. Holding anticoagulation/ppx due to recent h/o bleeding, thrombocytopenia.   Type and screen

## 2018-09-01 NOTE — PROGRESS NOTES
Dr Louie notified low BP.OK per Dr Louie to start dialysis trx. 300 BFR, 600 DFR and no fluid removal until begin blood transfusion.

## 2018-09-01 NOTE — CONSULTS
Full note to follow     Patient with possible left mid lung field pneumonia and recent LEEP and pelvic embolization for bleeding - now with fevers to 103.  GYN exam completed and felt not to be a GYN source    BC x 4 - + GPC c/w staph -   For now agree with the 1 dose of vanc - may need a level tomorrow   Also Ok with cefepime dosed post dialysis  - we may be able to stop this once GPC if id  Also will need BC in the AM 9-2-18

## 2018-09-01 NOTE — HPI
"Ms. Patel is a 28 y/o female with ESRD (on HD MWF), h/o liver transplant (on immunosuppression with prograf and prednisone), poorly controlled HTN with medication non-compliance, diastolic HF, and anemia from chronic kidney disease and blood loss (s/p LEEP). She presented to the ED today for fever.  Patient states last night she had fever T-max of 104.3°  and attempted to take Children's Tylenol with no relief of symptoms. Patient states today her fever was 104°. Patient endorses generalized body aches that she awoke with yesterday but denies any cough, congestion, shortness of breath or chest pain. She is anuric.      Notably, she had a LEEP procedure in June 2018 which was complicated by persistent vaginal bleeding and multiple admissions for anemia. Per previous d/c summ: "On 8/8, they were able to perform an EUA where they noted "cervix visualized with clot in place, no active bleeding. Sutures from prior surgeries remain visible and intact. Insufficient cervix for Eddoloop. Monsel's and packing placed."  Packing was subsequently removed on POD 3. Since discharge (8/16) patient reports that the bleeding has stopped. She has not had any pelvic pain. She does report some black dry discharge with a foul odor.      Patient with fevers and chills but no sinus drainage, cough, N V D, hodge snot make any urine, no skin breakdown.  Just on dialysis and the graft worked fine.  No abd pain with recent LEEP and fibroid embolization       "

## 2018-09-02 LAB
ALBUMIN SERPL BCP-MCNC: 2.2 G/DL
ALP SERPL-CCNC: 443 U/L
ALT SERPL W/O P-5'-P-CCNC: 39 U/L
ANION GAP SERPL CALC-SCNC: 10 MMOL/L
ANISOCYTOSIS BLD QL SMEAR: SLIGHT
AORTIC VALVE REGURGITATION: ABNORMAL
AST SERPL-CCNC: 36 U/L
BASOPHILS # BLD AUTO: 0 K/UL
BASOPHILS NFR BLD: 0 %
BILIRUB SERPL-MCNC: 1.1 MG/DL
BUN SERPL-MCNC: 37 MG/DL
CALCIUM SERPL-MCNC: 7.3 MG/DL
CHLORIDE SERPL-SCNC: 99 MMOL/L
CO2 SERPL-SCNC: 24 MMOL/L
CREAT SERPL-MCNC: 6.6 MG/DL
DIASTOLIC DYSFUNCTION: YES
DIFFERENTIAL METHOD: ABNORMAL
EOSINOPHIL # BLD AUTO: 0.5 K/UL
EOSINOPHIL NFR BLD: 8.1 %
ERYTHROCYTE [DISTWIDTH] IN BLOOD BY AUTOMATED COUNT: 16.7 %
EST. GFR  (AFRICAN AMERICAN): 9.1 ML/MIN/1.73 M^2
EST. GFR  (NON AFRICAN AMERICAN): 7.9 ML/MIN/1.73 M^2
ESTIMATED PA SYSTOLIC PRESSURE: 48.43
GLOBAL PERICARDIAL EFFUSION: ABNORMAL
GLUCOSE SERPL-MCNC: 105 MG/DL
HCT VFR BLD AUTO: 23.8 %
HGB BLD-MCNC: 8 G/DL
HYPOCHROMIA BLD QL SMEAR: ABNORMAL
IMM GRANULOCYTES # BLD AUTO: 0.04 K/UL
IMM GRANULOCYTES NFR BLD AUTO: 0.7 %
INR PPP: 1.2
LYMPHOCYTES # BLD AUTO: 0.5 K/UL
LYMPHOCYTES NFR BLD: 9.4 %
MAGNESIUM SERPL-MCNC: 1.8 MG/DL
MCH RBC QN AUTO: 28.2 PG
MCHC RBC AUTO-ENTMCNC: 33.6 G/DL
MCV RBC AUTO: 84 FL
MITRAL VALVE REGURGITATION: ABNORMAL
MONOCYTES # BLD AUTO: 0.3 K/UL
MONOCYTES NFR BLD: 5.2 %
NEUTROPHILS # BLD AUTO: 4.3 K/UL
NEUTROPHILS NFR BLD: 76.6 %
NRBC BLD-RTO: 0 /100 WBC
PHOSPHATE SERPL-MCNC: 2.9 MG/DL
PLATELET # BLD AUTO: 48 K/UL
PLATELET BLD QL SMEAR: ABNORMAL
PMV BLD AUTO: ABNORMAL FL
POLYCHROMASIA BLD QL SMEAR: ABNORMAL
POTASSIUM SERPL-SCNC: 4.1 MMOL/L
PROT SERPL-MCNC: 6.5 G/DL
PROTHROMBIN TIME: 12.4 SEC
RBC # BLD AUTO: 2.84 M/UL
RETIRED EF AND QEF - SEE NOTES: 68 (ref 55–65)
SODIUM SERPL-SCNC: 133 MMOL/L
TACROLIMUS BLD-MCNC: 5.6 NG/ML
TRICUSPID VALVE REGURGITATION: ABNORMAL
VANCOMYCIN SERPL-MCNC: 22.6 UG/ML
WBC # BLD AUTO: 5.56 K/UL

## 2018-09-02 PROCEDURE — 85025 COMPLETE CBC W/AUTO DIFF WBC: CPT | Mod: NTX

## 2018-09-02 PROCEDURE — 99232 SBSQ HOSP IP/OBS MODERATE 35: CPT | Mod: NTX,,, | Performed by: INTERNAL MEDICINE

## 2018-09-02 PROCEDURE — 84100 ASSAY OF PHOSPHORUS: CPT | Mod: NTX

## 2018-09-02 PROCEDURE — 36415 COLL VENOUS BLD VENIPUNCTURE: CPT | Mod: NTX

## 2018-09-02 PROCEDURE — 80197 ASSAY OF TACROLIMUS: CPT | Mod: NTX

## 2018-09-02 PROCEDURE — 20600001 HC STEP DOWN PRIVATE ROOM: Mod: NTX

## 2018-09-02 PROCEDURE — 80202 ASSAY OF VANCOMYCIN: CPT | Mod: NTX

## 2018-09-02 PROCEDURE — 85610 PROTHROMBIN TIME: CPT | Mod: NTX

## 2018-09-02 PROCEDURE — 25000003 PHARM REV CODE 250: Mod: NTX | Performed by: HOSPITALIST

## 2018-09-02 PROCEDURE — 83735 ASSAY OF MAGNESIUM: CPT | Mod: NTX

## 2018-09-02 PROCEDURE — 99233 SBSQ HOSP IP/OBS HIGH 50: CPT | Mod: NTX,,, | Performed by: HOSPITALIST

## 2018-09-02 PROCEDURE — 93306 TTE W/DOPPLER COMPLETE: CPT | Mod: 26,NTX,, | Performed by: INTERNAL MEDICINE

## 2018-09-02 PROCEDURE — 80053 COMPREHEN METABOLIC PANEL: CPT | Mod: NTX

## 2018-09-02 PROCEDURE — 63600175 PHARM REV CODE 636 W HCPCS: Mod: NTX | Performed by: HOSPITALIST

## 2018-09-02 PROCEDURE — 93306 TTE W/DOPPLER COMPLETE: CPT | Mod: NTX

## 2018-09-02 RX ADMIN — LABETALOL HCL 500 MG: 300 TABLET, FILM COATED ORAL at 10:09

## 2018-09-02 RX ADMIN — NIFEDIPINE 120 MG: 60 TABLET, FILM COATED, EXTENDED RELEASE ORAL at 10:09

## 2018-09-02 RX ADMIN — TACROLIMUS 8 MG: 5 CAPSULE ORAL at 08:09

## 2018-09-02 RX ADMIN — IRBESARTAN 300 MG: 300 TABLET ORAL at 10:09

## 2018-09-02 RX ADMIN — ACETAMINOPHEN 650 MG: 325 TABLET, FILM COATED ORAL at 06:09

## 2018-09-02 RX ADMIN — TACROLIMUS 7 MG: 5 CAPSULE ORAL at 05:09

## 2018-09-02 RX ADMIN — ASPIRIN 81 MG CHEWABLE TABLET 81 MG: 81 TABLET CHEWABLE at 10:09

## 2018-09-02 RX ADMIN — ACETAMINOPHEN 650 MG: 325 TABLET, FILM COATED ORAL at 03:09

## 2018-09-02 RX ADMIN — HYDRALAZINE HYDROCHLORIDE 100 MG: 50 TABLET ORAL at 10:09

## 2018-09-02 RX ADMIN — FAMOTIDINE 20 MG: 20 TABLET ORAL at 10:09

## 2018-09-02 RX ADMIN — CITALOPRAM HYDROBROMIDE 20 MG: 10 TABLET ORAL at 10:09

## 2018-09-02 RX ADMIN — TACROLIMUS 7 MG: 5 CAPSULE ORAL at 10:09

## 2018-09-02 NOTE — TREATMENT PLAN
Chart reviewed    Prograf level 5.6 today. Continue current dose of prograf 7mg/8mg. Continue to hold prednisone in the setting of sepsis.    Jenniffer Servin MD  Gastroenterology Fellow, PGY-IV   [phone]

## 2018-09-02 NOTE — PROGRESS NOTES
"[facility]  Infectious Disease  Progress Note    Patient Name: Holly Patel  MRN: [id number]  Admission Date: 8/31/2018  Length of Stay: 2 days  Attending Physician: Alexander Chicas MD  Primary Care Provider: Stan Sosa MD    Isolation Status: No active isolations  Assessment/Plan:      * Sepsis    Ms. Patel is a 26 y/o female with ESRD (on HD MWF), h/o liver transplant (on immunosuppression with prograf and prednisone), poorly controlled HTN with medication non-compliance, diastolic HF, and anemia from chronic kidney disease and blood loss (s/p LEEP). She presented to the ED today for fever.  Patient states last night she had fever T-max of 104.3°  and attempted to take Children's Tylenol with no relief of symptoms. Patient states today her fever was 104°. Patient endorses generalized body aches that she awoke with yesterday but denies any cough, congestion, shortness of breath or chest pain. She is anuric.      Notably, she had a LEEP procedure in June 2018 which was complicated by persistent vaginal bleeding and multiple admissions for anemia. Per previous d/c summ: "On 8/8, they were able to perform an EUA where they noted "cervix visualized with clot in place, no active bleeding. Sutures from prior surgeries remain visible and intact. Insufficient cervix for Eddoloop. Monsel's and packing placed."  Packing was subsequently removed on POD 3. Since discharge (8/16) patient reports that the bleeding has stopped. She has not had any pelvic pain. She does report some black dry discharge with a foul odor.      Patient with fevers and chills but no sinus drainage, cough, N V D, hodge snot make any urine, no skin breakdown.  Just on dialysis and the graft worked fine.  No abd pain with recent LEEP and fibroid embolization     Patient with 4/4 BC + staph - other cultures negative BC from 9-1 neg so far - Echo negative     1. Keep on vanc  - will stop cefepime today    2.  Will watch BC  If " "negative for 48 hours could consider DC with outpatient abx with dialysis       ID will follow with you            Anticipated Disposition:     Thank you for your consult. I will follow-up with patient. Please contact us if you have any additional questions.    Philip Mayorga MD  Infectious Disease  [facility]    Subjective:     Principal Problem:Sepsis    HPI: Ms. Patel is a 26 y/o female with ESRD (on HD MWF), h/o liver transplant (on immunosuppression with prograf and prednisone), poorly controlled HTN with medication non-compliance, diastolic HF, and anemia from chronic kidney disease and blood loss (s/p LEEP). She presented to the ED today for fever.  Patient states last night she had fever T-max of 104.3°  and attempted to take Children's Tylenol with no relief of symptoms. Patient states today her fever was 104°. Patient endorses generalized body aches that she awoke with yesterday but denies any cough, congestion, shortness of breath or chest pain. She is anuric.      Notably, she had a LEEP procedure in June 2018 which was complicated by persistent vaginal bleeding and multiple admissions for anemia. Per previous d/c summ: "On 8/8, they were able to perform an EUA where they noted "cervix visualized with clot in place, no active bleeding. Sutures from prior surgeries remain visible and intact. Insufficient cervix for Eddoloop. Monsel's and packing placed."  Packing was subsequently removed on POD 3. Since discharge (8/16) patient reports that the bleeding has stopped. She has not had any pelvic pain. She does report some black dry discharge with a foul odor.      Patient with fevers and chills but no sinus drainage, cough, N V D, hodge snot make any urine, no skin breakdown.  Just on dialysis and the graft worked fine.  No abd pain with recent LEEP and fibroid embolization       Interval History: Patient is doing great with no fevers or chills - no other symptoms today     Review of " Systems     Constitutional: Negative today .   HENT: Negative.    Eyes: Negative.    Respiratory: Negative.    Gastrointestinal: Negative.    Endocrine: Negative.    Genitourinary: Negative.    Musculoskeletal: Negative.    Allergic/Immunologic: Negative.    Neurological: Negative.    Hematological: Negative.    Psychiatric/Behavioral: Negative.   Objective:     Vital Signs (Most Recent):  Temp: 99.9 °F (37.7 °C) (09/02/18 1522)  Pulse: 99 (09/02/18 1455)  Resp: 16 (09/02/18 1335)  BP: 118/72 (09/02/18 1335)  SpO2: (!) 93 % (09/02/18 1335) Vital Signs (24h Range):  Temp:  [98.1 °F (36.7 °C)-100.5 °F (38.1 °C)] 99.9 °F (37.7 °C)  Pulse:  [] 99  Resp:  [16-20] 16  SpO2:  [93 %-98 %] 93 %  BP: (118-155)/(72-96) 118/72     Weight: 50.9 kg (112 lb 3.4 oz)  Body mass index is 18.67 kg/m².    Estimated Creatinine Clearance: 10.3 mL/min (A) (based on SCr of 6.6 mg/dL (H)).    Physical Exam     Constitutional: She is oriented to person, place, and time. She appears well-developed and well-nourished.   HENT:   Head: Normocephalic and atraumatic.   Eyes: Pupils are equal, round, and reactive to light.   Neck: Normal range of motion. Neck supple.   Cardiovascular: Normal rate and regular rhythm.   Pulmonary/Chest: Effort normal and breath sounds normal.   Abdominal: Soft. Bowel sounds are normal.   Musculoskeletal: Normal range of motion.   Neurological: She is alert and oriented to person, place, and time.   Skin: Skin is warm and dry.   Dialysis access on left forearm no pain or erythema    Significant Labs: All pertinent labs within the past 24 hours have been reviewed.    Significant Imaging: I have reviewed all pertinent imaging results/findings within the past 24 hours.

## 2018-09-02 NOTE — ASSESSMENT & PLAN NOTE
"Ms. Patel is a 28 y/o female with ESRD (on HD MWF), h/o liver transplant (on immunosuppression with prograf and prednisone), poorly controlled HTN with medication non-compliance, diastolic HF, and anemia from chronic kidney disease and blood loss (s/p LEEP). She presented to the ED today for fever.  Patient states last night she had fever T-max of 104.3°  and attempted to take Children's Tylenol with no relief of symptoms. Patient states today her fever was 104°. Patient endorses generalized body aches that she awoke with yesterday but denies any cough, congestion, shortness of breath or chest pain. She is anuric.      Notably, she had a LEEP procedure in June 2018 which was complicated by persistent vaginal bleeding and multiple admissions for anemia. Per previous d/c summ: "On 8/8, they were able to perform an EUA where they noted "cervix visualized with clot in place, no active bleeding. Sutures from prior surgeries remain visible and intact. Insufficient cervix for Eddoloop. Monsel's and packing placed."  Packing was subsequently removed on POD 3. Since discharge (8/16) patient reports that the bleeding has stopped. She has not had any pelvic pain. She does report some black dry discharge with a foul odor.      Patient with fevers and chills but no sinus drainage, cough, N V D, hodge snot make any urine, no skin breakdown.  Just on dialysis and the graft worked fine.  No abd pain with recent LEEP and fibroid embolization     Patient with 4/4 BC + staph - other cultures negative BC from 9-1 neg so far - Echo negative     1. Keep on vanc  - will stop cefepime today    2.  Will watch BC  If negative for 48 hours could consider DC with outpatient abx with dialysis       ID will follow with you  "

## 2018-09-02 NOTE — PLAN OF CARE
Problem: Patient Care Overview  Goal: Plan of Care Review  Outcome: Ongoing (interventions implemented as appropriate)  No acute events overnight.  All vital signs stable.  No complaints.  AAOx4.  Safety maintained.  WCTM.

## 2018-09-02 NOTE — SUBJECTIVE & OBJECTIVE
Interval History: Patient is doing great with no fevers or chills - no other symptoms today     Review of Systems     Constitutional: Negative today .   HENT: Negative.    Eyes: Negative.    Respiratory: Negative.    Gastrointestinal: Negative.    Endocrine: Negative.    Genitourinary: Negative.    Musculoskeletal: Negative.    Allergic/Immunologic: Negative.    Neurological: Negative.    Hematological: Negative.    Psychiatric/Behavioral: Negative.   Objective:     Vital Signs (Most Recent):  Temp: 99.9 °F (37.7 °C) (09/02/18 1522)  Pulse: 99 (09/02/18 1455)  Resp: 16 (09/02/18 1335)  BP: 118/72 (09/02/18 1335)  SpO2: (!) 93 % (09/02/18 1335) Vital Signs (24h Range):  Temp:  [98.1 °F (36.7 °C)-100.5 °F (38.1 °C)] 99.9 °F (37.7 °C)  Pulse:  [] 99  Resp:  [16-20] 16  SpO2:  [93 %-98 %] 93 %  BP: (118-155)/(72-96) 118/72     Weight: 50.9 kg (112 lb 3.4 oz)  Body mass index is 18.67 kg/m².    Estimated Creatinine Clearance: 10.3 mL/min (A) (based on SCr of 6.6 mg/dL (H)).    Physical Exam     Constitutional: She is oriented to person, place, and time. She appears well-developed and well-nourished.   HENT:   Head: Normocephalic and atraumatic.   Eyes: Pupils are equal, round, and reactive to light.   Neck: Normal range of motion. Neck supple.   Cardiovascular: Normal rate and regular rhythm.   Pulmonary/Chest: Effort normal and breath sounds normal.   Abdominal: Soft. Bowel sounds are normal.   Musculoskeletal: Normal range of motion.   Neurological: She is alert and oriented to person, place, and time.   Skin: Skin is warm and dry.   Dialysis access on left forearm no pain or erythema    Significant Labs: All pertinent labs within the past 24 hours have been reviewed.    Significant Imaging: I have reviewed all pertinent imaging results/findings within the past 24 hours.

## 2018-09-02 NOTE — PROGRESS NOTES
Progress Note   Hospital Medicine         Patient Name: Holly Patel  MRN:  [id number]  [facility] Medicine Team: [facility] Alexander Chicas MD  Date of Admission:  8/31/2018     Length of Stay:  LOS: 2 days   Expected Discharge Date: 9/5/2018  Principal Problem:  Sepsis       Subjective:     Interval History/Overnight Events:  Patient doing well today; had a low grade fever yesterday; repeat blood cultures NGTD; 2d echo done and shows no vegetations, but does show moderate MR; cont vanc, cefepime stopped; cultures growing staph aureus     Review of Systems   Constitutional: Negative for chills, fatigue, fever.   HENT: Negative for sore throat, trouble swallowing.    Eyes: Negative for photophobia, visual disturbance.   Respiratory: Negative for cough, shortness of breath.    Cardiovascular: Negative for chest pain, palpitations, leg swelling.   Gastrointestinal: Negative for abdominal pain, constipation, diarrhea, nausea, vomiting.   Endocrine: Negative for cold intolerance, heat intolerance.   Genitourinary: Negative for dysuria, frequency.   Musculoskeletal: Negative for arthralgias, myalgias.   Skin: Negative for rash, wound, erythema   Neurological: Negative for dizziness, syncope, weakness, light-headedness.   Psychiatric/Behavioral: Negative for confusion, hallucinations, anxiety  All other systems reviewed and are negative.    Objective:     Temp:  [98.1 °F (36.7 °C)-100.5 °F (38.1 °C)]   Pulse:  []   Resp:  [16-20]   BP: (117-155)/(65-96)   SpO2:  [93 %-98 %]       Physical Exam:  Constitutional: Appears well-developed and well-nourished.   Head: Normocephalic and atraumatic.   Mouth/Throat: Oropharynx is clear and moist.   Eyes: EOM are normal. Pupils are equal, round, and reactive to light. No scleral icterus.   Neck: Normal range of motion. Neck supple.   Cardiovascular: Normal rate and regular rhythm.  No murmur heard.  Pulmonary/Chest: Effort normal and breath sounds normal. No  respiratory distress. No wheezes, rales, or rhonchi  Abdominal: Soft. Bowel sounds are normal.  No distension or tenderness  Musculoskeletal: Normal range of motion. No edema.   Neurological: Alert and oriented to person, place, and time.   Skin: Skin is warm and dry.   Psychiatric: Normal mood and affect. Behavior is normal.     Recent Labs   Lab  08/31/18   1515  09/01/18   1121  09/02/18   0341   WBC  7.68  5.72  5.56   HGB  7.8*  6.6*  8.0*   HCT  23.4*  19.8*  23.8*   PLT  46*  40*  48*     Recent Labs   Lab  08/31/18   1515  09/01/18   0745  09/02/18   0341   NA  131*  133*  133*   K  3.9  4.4  4.1   CL  98  103  99   CO2  21*  18*  24   BUN  51*  63*  37*   CREATININE  10.1*  10.8*  6.6*   GLU  120*  102  105   CALCIUM  8.8  8.1*  7.3*   MG  2.1   --   1.8   PHOS  4.1  4.7*  2.9     Recent Labs   Lab  08/31/18   1515  09/01/18   0745  09/02/18   0341   ALKPHOS  560*   --   443*   ALT  58*   --   39   AST  74*   --   36   ALBUMIN  2.7*  2.2*  2.2*   PROT  7.4   --   6.5   BILITOT  1.1*   --   1.1*   INR   --    --   1.2     No results for input(s): POCTGLUCOSE in the last 168 hours.     aspirin  81 mg Oral Daily    cinacalcet  30 mg Oral Every Tues, Thurs, Sat    citalopram  20 mg Oral Daily    cloNIDine 0.3 mg/24 hr td ptwk  1 patch Transdermal Q7 Days    famotidine  20 mg Oral Daily    hydrALAZINE  100 mg Oral Q12H    irbesartan  300 mg Oral Daily    labetalol  500 mg Oral Q12H    NIFEdipine  120 mg Oral Daily    tacrolimus  8 mg Oral Daily    And    tacrolimus  7 mg Oral Daily       Assessment and Plan     Ms. Holly Patel is a 27 y.o. female who presented to [facility] on 8/31/2018 with     Hospital Course:    Ms. Holly Patel was admitted to Hospital Medicine for management of     Active Hospital Problems    Diagnosis  POA    *Sepsis [A41.9]  Yes    Gram-positive bacteremia [R78.81]  Yes    Hyponatremia [E87.1]  Yes    Pneumonia of right lower lobe due to infectious  organism [J18.1]  Yes    Hypertensive emergency [I16.1]  Yes    Thrombocytopenia [D69.6]  Yes    Immunosuppressed [D89.9]  Yes     Chronic    Secondary hyperparathyroidism [N25.81]  Yes    Anemia in ESRD (end-stage renal disease) [N18.6, D63.1]  Yes     Chronic    Renovascular hypertension [I15.0]  Yes     Chronic    ESRD on hemodialysis [N18.6, Z99.2]  Not Applicable     Chronic    Prophylactic immunotherapy [Z29.8]  Not Applicable   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 [Z94.4]  Not Applicable     Chronic     hemangioendothelioma s/p LTx (1992)        Resolved Hospital Problems   No resolved problems to display.     # Sepsis due to gram positive bacteremia  - possibly due to recent gyn procedure  - cont vanc for now,renally dose, daily vanc levels   - repeat blood cultures NGTD and get 2d echo reviewed   - transplant ID consulted and appreciate recs    # Anemia of ESRD  # Thrombocytopenia  - transfused 1 unit of PRBC on 9/1 with good response     # H/O Liver transplant  # Immunosuppressed status  - hepatology consulted  - cont prograf and prednisone and daily prograf lvevels    # ESRD on HD  - nephrology consulted; HD as per them    # Renovascular HTN  - cont BP meds    # Hyponatremia  - correct with HD            Diet:  Renal   GI PPx:    DVT PPx:    Goals of Care:  full    High Risk Conditions:    Sepsis     Disposition:  Possibly Tuesday, will need abx with HD as outpatient     Alexander Chicas MD  Medical Director [facility]  Spectra:  [phone]  Pager: [phone]

## 2018-09-02 NOTE — PLAN OF CARE
Problem: Patient Care Overview  Goal: Plan of Care Review  Outcome: Ongoing (interventions implemented as appropriate)  Max temp 99.9. Tylenol admin. BP controlled through shift 110's/80's. No c/o pain during shift. VSS, No acute distress during shift. Family at bedside. WCTM. Safety intact.

## 2018-09-03 LAB
ALBUMIN SERPL BCP-MCNC: 2.2 G/DL
ALP SERPL-CCNC: 421 U/L
ALT SERPL W/O P-5'-P-CCNC: 32 U/L
ANION GAP SERPL CALC-SCNC: 12 MMOL/L
ANISOCYTOSIS BLD QL SMEAR: SLIGHT
AST SERPL-CCNC: 25 U/L
BACTERIA BLD CULT: NORMAL
BASOPHILS # BLD AUTO: 0.01 K/UL
BASOPHILS NFR BLD: 0.2 %
BILIRUB SERPL-MCNC: 0.8 MG/DL
BUN SERPL-MCNC: 50 MG/DL
CALCIUM SERPL-MCNC: 7.7 MG/DL
CHLORIDE SERPL-SCNC: 98 MMOL/L
CO2 SERPL-SCNC: 22 MMOL/L
CREAT SERPL-MCNC: 8.1 MG/DL
DIFFERENTIAL METHOD: ABNORMAL
EOSINOPHIL # BLD AUTO: 0.4 K/UL
EOSINOPHIL NFR BLD: 7.4 %
ERYTHROCYTE [DISTWIDTH] IN BLOOD BY AUTOMATED COUNT: 16.8 %
EST. GFR  (AFRICAN AMERICAN): 7.1 ML/MIN/1.73 M^2
EST. GFR  (NON AFRICAN AMERICAN): 6.2 ML/MIN/1.73 M^2
GLUCOSE SERPL-MCNC: 117 MG/DL
HCT VFR BLD AUTO: 24.4 %
HGB BLD-MCNC: 8.1 G/DL
HYPOCHROMIA BLD QL SMEAR: ABNORMAL
IMM GRANULOCYTES # BLD AUTO: 0.04 K/UL
IMM GRANULOCYTES NFR BLD AUTO: 0.8 %
INR PPP: 1.1
LYMPHOCYTES # BLD AUTO: 0.7 K/UL
LYMPHOCYTES NFR BLD: 15 %
MAGNESIUM SERPL-MCNC: 2 MG/DL
MCH RBC QN AUTO: 27.3 PG
MCHC RBC AUTO-ENTMCNC: 33.2 G/DL
MCV RBC AUTO: 82 FL
MONOCYTES # BLD AUTO: 0.4 K/UL
MONOCYTES NFR BLD: 7.2 %
NEUTROPHILS # BLD AUTO: 3.4 K/UL
NEUTROPHILS NFR BLD: 69.4 %
NRBC BLD-RTO: 0 /100 WBC
PHOSPHATE SERPL-MCNC: 3.6 MG/DL
PLATELET # BLD AUTO: 58 K/UL
PLATELET BLD QL SMEAR: ABNORMAL
PMV BLD AUTO: 10.8 FL
POTASSIUM SERPL-SCNC: 4 MMOL/L
PROT SERPL-MCNC: 6.7 G/DL
PROTHROMBIN TIME: 11.8 SEC
RBC # BLD AUTO: 2.97 M/UL
SODIUM SERPL-SCNC: 132 MMOL/L
TACROLIMUS BLD-MCNC: 8.6 NG/ML
VANCOMYCIN SERPL-MCNC: 20.2 UG/ML
WBC # BLD AUTO: 4.87 K/UL

## 2018-09-03 PROCEDURE — 99232 SBSQ HOSP IP/OBS MODERATE 35: CPT | Mod: NTX,,, | Performed by: INTERNAL MEDICINE

## 2018-09-03 PROCEDURE — 63600175 PHARM REV CODE 636 W HCPCS: Mod: NTX | Performed by: HOSPITALIST

## 2018-09-03 PROCEDURE — 85610 PROTHROMBIN TIME: CPT | Mod: NTX

## 2018-09-03 PROCEDURE — 25000003 PHARM REV CODE 250: Mod: NTX | Performed by: HOSPITALIST

## 2018-09-03 PROCEDURE — 80202 ASSAY OF VANCOMYCIN: CPT | Mod: NTX

## 2018-09-03 PROCEDURE — 83735 ASSAY OF MAGNESIUM: CPT | Mod: NTX

## 2018-09-03 PROCEDURE — 63600175 PHARM REV CODE 636 W HCPCS: Mod: NTX | Performed by: INTERNAL MEDICINE

## 2018-09-03 PROCEDURE — 84100 ASSAY OF PHOSPHORUS: CPT | Mod: NTX

## 2018-09-03 PROCEDURE — 80197 ASSAY OF TACROLIMUS: CPT | Mod: NTX

## 2018-09-03 PROCEDURE — 85025 COMPLETE CBC W/AUTO DIFF WBC: CPT | Mod: NTX

## 2018-09-03 PROCEDURE — 99233 SBSQ HOSP IP/OBS HIGH 50: CPT | Mod: NTX,,, | Performed by: HOSPITALIST

## 2018-09-03 PROCEDURE — 20600001 HC STEP DOWN PRIVATE ROOM: Mod: NTX

## 2018-09-03 PROCEDURE — 80053 COMPREHEN METABOLIC PANEL: CPT | Mod: NTX

## 2018-09-03 RX ORDER — SODIUM CHLORIDE 9 MG/ML
INJECTION, SOLUTION INTRAVENOUS ONCE
Status: COMPLETED | OUTPATIENT
Start: 2018-09-04 | End: 2018-09-04

## 2018-09-03 RX ORDER — SODIUM CHLORIDE 9 MG/ML
INJECTION, SOLUTION INTRAVENOUS
Status: DISCONTINUED | OUTPATIENT
Start: 2018-09-04 | End: 2018-09-04 | Stop reason: HOSPADM

## 2018-09-03 RX ORDER — VANCOMYCIN HCL IN 5 % DEXTROSE 1G/250ML
1000 PLASTIC BAG, INJECTION (ML) INTRAVENOUS ONCE
Status: DISCONTINUED | OUTPATIENT
Start: 2018-09-04 | End: 2018-09-04 | Stop reason: DRUGHIGH

## 2018-09-03 RX ADMIN — IRBESARTAN 300 MG: 300 TABLET ORAL at 08:09

## 2018-09-03 RX ADMIN — FAMOTIDINE 20 MG: 20 TABLET ORAL at 08:09

## 2018-09-03 RX ADMIN — TACROLIMUS 8 MG: 5 CAPSULE ORAL at 08:09

## 2018-09-03 RX ADMIN — HYDRALAZINE HYDROCHLORIDE 100 MG: 50 TABLET ORAL at 08:09

## 2018-09-03 RX ADMIN — CITALOPRAM HYDROBROMIDE 20 MG: 10 TABLET ORAL at 08:09

## 2018-09-03 RX ADMIN — NIFEDIPINE 120 MG: 60 TABLET, FILM COATED, EXTENDED RELEASE ORAL at 08:09

## 2018-09-03 RX ADMIN — LABETALOL HCL 500 MG: 300 TABLET, FILM COATED ORAL at 08:09

## 2018-09-03 RX ADMIN — TACROLIMUS 7 MG: 5 CAPSULE ORAL at 05:09

## 2018-09-03 RX ADMIN — ASPIRIN 81 MG CHEWABLE TABLET 81 MG: 81 TABLET CHEWABLE at 08:09

## 2018-09-03 NOTE — PLAN OF CARE
Problem: Patient Care Overview  Goal: Plan of Care Review  Outcome: Ongoing (interventions implemented as appropriate)  No acute events overnight.  All vital signs stable blood pressures were lower so labatelol 500mg and hydralazine 100mg held.  No complaints except increased abdominal distension due to fluid retension.    AAOx4.  Safety maintained.  WCTM.

## 2018-09-03 NOTE — SUBJECTIVE & OBJECTIVE
Interval History: No new issues - plans to go home in AM     Review of Systems       Constitutional: Negative today .   HENT: Negative.    Eyes: Negative.    Respiratory: Negative.    Gastrointestinal: Negative.    Endocrine: Negative.    Genitourinary: Negative.    Musculoskeletal: Negative.    Allergic/Immunologic: Negative.    Neurological: Negative.    Hematological: Negative.    Objective:     Vital Signs (Most Recent):  Temp: 98.1 °F (36.7 °C) (09/03/18 1549)  Pulse: 87 (09/03/18 1549)  Resp: 18 (09/03/18 1549)  BP: (!) 100/55 (09/03/18 1549)  SpO2: 96 % (09/03/18 1549) Vital Signs (24h Range):  Temp:  [97.8 °F (36.6 °C)-99.9 °F (37.7 °C)] 98.1 °F (36.7 °C)  Pulse:  [87-96] 87  Resp:  [16-18] 18  SpO2:  [93 %-98 %] 96 %  BP: ()/(54-80) 100/55     Weight: 50.9 kg (112 lb 3.4 oz)  Body mass index is 18.67 kg/m².    Estimated Creatinine Clearance: 8.4 mL/min (A) (based on SCr of 8.1 mg/dL (H)).    Physical Exam     Constitutional: She is oriented to person, place, and time. She appears well-developed and well-nourished.   HENT:   Head: Normocephalic and atraumatic.   Eyes: Pupils are equal, round, and reactive to light.   Neck: Normal range of motion. Neck supple.   Cardiovascular: Normal rate and regular rhythm.   Pulmonary/Chest: Effort normal and breath sounds normal.   Abdominal: Soft. Bowel sounds are normal.   Musculoskeletal: Normal range of motion.   Neurological: She is alert and oriented to person, place, and time.   Skin: Skin is warm and dry.   Dialysis access on left forearm no pain or erythema  Significant Labs: All pertinent labs within the past 24 hours have been reviewed.    Significant Imaging: I have reviewed all pertinent imaging results/findings within the past 24 hours.

## 2018-09-03 NOTE — ASSESSMENT & PLAN NOTE
"Ms. Patel is a 28 y/o female with ESRD (on HD MWF), h/o liver transplant (on immunosuppression with prograf and prednisone), poorly controlled HTN with medication non-compliance, diastolic HF, and anemia from chronic kidney disease and blood loss (s/p LEEP). She presented to the ED today for fever.  Patient states last night she had fever T-max of 104.3°  and attempted to take Children's Tylenol with no relief of symptoms. Patient states today her fever was 104°. Patient endorses generalized body aches that she awoke with yesterday but denies any cough, congestion, shortness of breath or chest pain. She is anuric.      Notably, she had a LEEP procedure in June 2018 which was complicated by persistent vaginal bleeding and multiple admissions for anemia. Per previous d/c summ: "On 8/8, they were able to perform an EUA where they noted "cervix visualized with clot in place, no active bleeding. Sutures from prior surgeries remain visible and intact. Insufficient cervix for Eddoloop. Monsel's and packing placed."  Packing was subsequently removed on POD 3. Since discharge (8/16) patient reports that the bleeding has stopped. She has not had any pelvic pain. She does report some black dry discharge with a foul odor.      Patient with fevers and chills but no sinus drainage, cough, N V D, hodge snot make any urine, no skin breakdown.  Just on dialysis and the graft worked fine.  No abd pain with recent LEEP and fibroid embolization     Patient with 4/4 BC + staph - other cultures negative BC from 9-1 neg so far - Echo negative  - MSSA isolated BC clear     1.  Keep on vanc even though MSSA due to ease of treatment - will need a total of 2 weeks from 9-1-18 - so until 9-16-18   This can be given on dialysis - agree with giving dose in the AM prior to DC     Thanks for the consult - ID will sign off  "

## 2018-09-03 NOTE — PROGRESS NOTES
Progress Note   Hospital Medicine         Patient Name: Holly Patel  MRN:  [id number]  [facility] Medicine Team: [facility] L Alexander Chicas MD  Date of Admission:  8/31/2018     Length of Stay:  LOS: 3 days   Expected Discharge Date: 9/5/2018  Principal Problem:  Sepsis       Subjective:     Interval History/Overnight Events:  Patient doing well today; still therapeutic with vanc; as per ID needs 2 weeks of vanc with end date of 9/16; need to determine exact dose of vanc tomorrow; will get HD and then that dose of vanc and can go home; will need to send vanc script to patient's HD center with stop date tomorrow     Review of Systems   Constitutional: Negative for chills, fatigue, fever.   HENT: Negative for sore throat, trouble swallowing.    Eyes: Negative for photophobia, visual disturbance.   Respiratory: Negative for cough, shortness of breath.    Cardiovascular: Negative for chest pain, palpitations, leg swelling.   Gastrointestinal: Negative for abdominal pain, constipation, diarrhea, nausea, vomiting.   Endocrine: Negative for cold intolerance, heat intolerance.   Genitourinary: Negative for dysuria, frequency.   Musculoskeletal: Negative for arthralgias, myalgias.   Skin: Negative for rash, wound, erythema   Neurological: Negative for dizziness, syncope, weakness, light-headedness.   Psychiatric/Behavioral: Negative for confusion, hallucinations, anxiety  All other systems reviewed and are negative.    Objective:     Temp:  [97.8 °F (36.6 °C)-98.3 °F (36.8 °C)]   Pulse:  [87-96]   Resp:  [16-18]   BP: ()/(54-80)   SpO2:  [93 %-98 %]       Physical Exam:  Constitutional: Appears well-developed and well-nourished.   Head: Normocephalic and atraumatic.   Mouth/Throat: Oropharynx is clear and moist.   Eyes: EOM are normal. Pupils are equal, round, and reactive to light. No scleral icterus.   Neck: Normal range of motion. Neck supple.   Cardiovascular: Normal rate and regular rhythm.  No murmur  heard.  Pulmonary/Chest: Effort normal and breath sounds normal. No respiratory distress. No wheezes, rales, or rhonchi  Abdominal: Soft. Bowel sounds are normal.  No distension or tenderness  Musculoskeletal: Normal range of motion. No edema.   Neurological: Alert and oriented to person, place, and time.   Skin: Skin is warm and dry.   Psychiatric: Normal mood and affect. Behavior is normal.     Recent Labs   Lab  08/31/18   1515  09/01/18   1121  09/02/18   0341  09/03/18   0325   WBC  7.68  5.72  5.56  4.87   HGB  7.8*  6.6*  8.0*  8.1*   HCT  23.4*  19.8*  23.8*  24.4*   PLT  46*  40*  48*  58*     Recent Labs   Lab  08/31/18   1515  09/01/18   0745  09/02/18   0341  09/03/18   0325   NA  131*  133*  133*  132*   K  3.9  4.4  4.1  4.0   CL  98  103  99  98   CO2  21*  18*  24  22*   BUN  51*  63*  37*  50*   CREATININE  10.1*  10.8*  6.6*  8.1*   GLU  120*  102  105  117*   CALCIUM  8.8  8.1*  7.3*  7.7*   MG  2.1   --   1.8  2.0   PHOS  4.1  4.7*  2.9  3.6     Recent Labs   Lab  08/31/18   1515  09/01/18   0745  09/02/18   0341  09/03/18   0325   ALKPHOS  560*   --   443*  421*   ALT  58*   --   39  32   AST  74*   --   36  25   ALBUMIN  2.7*  2.2*  2.2*  2.2*   PROT  7.4   --   6.5  6.7   BILITOT  1.1*   --   1.1*  0.8   INR   --    --   1.2  1.1     No results for input(s): POCTGLUCOSE in the last 168 hours.     aspirin  81 mg Oral Daily    cinacalcet  30 mg Oral Every Tues, Thurs, Sat    citalopram  20 mg Oral Daily    cloNIDine 0.3 mg/24 hr td ptwk  1 patch Transdermal Q7 Days    famotidine  20 mg Oral Daily    hydrALAZINE  100 mg Oral Q12H    irbesartan  300 mg Oral Daily    labetalol  500 mg Oral Q12H    NIFEdipine  120 mg Oral Daily    [START ON 9/4/2018] tacrolimus  7 mg Oral Daily    And    tacrolimus  7 mg Oral Daily       Assessment and Plan     Ms. Holly Patel is a 27 y.o. female who presented to [facility] on 8/31/2018 with     Hospital Course:    Ms. Holly Patel was  admitted to Hospital Medicine for management of     Active Hospital Problems    Diagnosis  POA    *Sepsis [A41.9]  Yes    Gram-positive bacteremia [R78.81]  Yes    Hyponatremia [E87.1]  Yes    Pneumonia of right lower lobe due to infectious organism [J18.1]  Yes    Hypertensive emergency [I16.1]  Yes    Thrombocytopenia [D69.6]  Yes    Immunosuppressed [D89.9]  Yes     Chronic    Secondary hyperparathyroidism [N25.81]  Yes    Anemia in ESRD (end-stage renal disease) [N18.6, D63.1]  Yes     Chronic    Renovascular hypertension [I15.0]  Yes     Chronic    ESRD on hemodialysis [N18.6, Z99.2]  Not Applicable     Chronic    Prophylactic immunotherapy [Z29.8]  Not Applicable   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 [Z94.4]  Not Applicable     Chronic     hemangioendothelioma s/p LTx (1992)        Resolved Hospital Problems   No resolved problems to display.     # Sepsis due to MSSA bacteremia   - possibly due to recent gyn procedure  - cont vanc for now,renally dose, daily vanc levels   - repeat blood cultures NGTD and get 2d echo reviewed   - transplant ID consulted and appreciate recs  - patient needs 2 weeks of Vanc with HD, stop date of 9/16, need to set up with patient's HD    # Anemia of ESRD  # Thrombocytopenia  - transfused 1 unit of PRBC on 9/1 with good response     # H/O Liver transplant  # Immunosuppressed status  - hepatology consulted  - cont prograf and prednisone and daily prograf lvevels    # ESRD on HD  - nephrology consulted; HD as per them    # Renovascular HTN  - cont BP meds    # Hyponatremia  - correct with HD            Diet:  Renal   GI PPx:    DVT PPx:    Goals of Care:  full    High Risk Conditions:    Sepsis     Disposition:  D/c tomorrow with outpatient IV vanc with HD for two weeks    Alexander Chicas MD  Medical Director [facility]  Spectra:  [phone]  Pager: [phone]

## 2018-09-03 NOTE — PHARMACY MED REC
"Admission Medication Reconciliation - Pharmacy Consult Note    The home medication history was taken by Vilma Woods, Pharmacy Technician.  Based on information gathered and subsequent review by the clinical pharmacist, the items below may need attention.     You may go to "Admission" then "Reconcile Home Medications" tabs to review and/or act upon these items.     Potentially problematic discrepancies with current MAR  o Patient IS taking the following which was not ordered upon admit  o Mycophenolate 1000 mg oral BID  o Prednisone 1 mg oral every other day    Please address this information as you see fit.  Feel free to contact us if you have any questions or require assistance.    Holly Santana, PharmD  Emergency Medicine Clinical Pharmacist  [phone] (2pm-midnight daily)                .    .            "

## 2018-09-03 NOTE — TREATMENT PLAN
Hepatology Immunosuppression Monitoring Note      Chart reviewed.    LFTs stable.  Synthetic function intact    Prograf trough level is 8.6.    Recommendations:  Daily tacrolimus trough level  CMP and INR daily  I have reduced tacrolimus to 7mg BID (from 8 and 7)    We will continue to monitor.  Please call with any questions.    Benito Paredes MD  Gastroenterology Fellow (PGY-VI)  Pager: [phone]

## 2018-09-03 NOTE — PROGRESS NOTES
"[facility]  Infectious Disease  Progress Note    Patient Name: Holly Patel  MRN: [id number]  Admission Date: 8/31/2018  Length of Stay: 3 days  Attending Physician: Alexander Chicas MD  Primary Care Provider: Stan Sosa MD    Isolation Status: No active isolations  Assessment/Plan:      * Sepsis    Ms. Patel is a 26 y/o female with ESRD (on HD MWF), h/o liver transplant (on immunosuppression with prograf and prednisone), poorly controlled HTN with medication non-compliance, diastolic HF, and anemia from chronic kidney disease and blood loss (s/p LEEP). She presented to the ED today for fever.  Patient states last night she had fever T-max of 104.3°  and attempted to take Children's Tylenol with no relief of symptoms. Patient states today her fever was 104°. Patient endorses generalized body aches that she awoke with yesterday but denies any cough, congestion, shortness of breath or chest pain. She is anuric.      Notably, she had a LEEP procedure in June 2018 which was complicated by persistent vaginal bleeding and multiple admissions for anemia. Per previous d/c summ: "On 8/8, they were able to perform an EUA where they noted "cervix visualized with clot in place, no active bleeding. Sutures from prior surgeries remain visible and intact. Insufficient cervix for Eddoloop. Monsel's and packing placed."  Packing was subsequently removed on POD 3. Since discharge (8/16) patient reports that the bleeding has stopped. She has not had any pelvic pain. She does report some black dry discharge with a foul odor.      Patient with fevers and chills but no sinus drainage, cough, N V D, hodge snot make any urine, no skin breakdown.  Just on dialysis and the graft worked fine.  No abd pain with recent LEEP and fibroid embolization     Patient with 4/4 BC + staph - other cultures negative BC from 9-1 neg so far - Echo negative  - MSSA isolated BC clear     1.  Keep on vanc even though MSSA due to " "ease of treatment - will need a total of 2 weeks from 9-1-18 - so until 9-16-18   This can be given on dialysis - agree with giving dose in the AM prior to DC     Thanks for the consult - ID will sign off            Anticipated Disposition:     Thank you for your consult. I will sign off. Please contact us if you have any additional questions.    Philip Mayorga MD  Infectious Disease  [facility]    Subjective:     Principal Problem:Sepsis    HPI: Ms. Patel is a 26 y/o female with ESRD (on HD MWF), h/o liver transplant (on immunosuppression with prograf and prednisone), poorly controlled HTN with medication non-compliance, diastolic HF, and anemia from chronic kidney disease and blood loss (s/p LEEP). She presented to the ED today for fever.  Patient states last night she had fever T-max of 104.3°  and attempted to take Children's Tylenol with no relief of symptoms. Patient states today her fever was 104°. Patient endorses generalized body aches that she awoke with yesterday but denies any cough, congestion, shortness of breath or chest pain. She is anuric.      Notably, she had a LEEP procedure in June 2018 which was complicated by persistent vaginal bleeding and multiple admissions for anemia. Per previous d/c summ: "On 8/8, they were able to perform an EUA where they noted "cervix visualized with clot in place, no active bleeding. Sutures from prior surgeries remain visible and intact. Insufficient cervix for Eddoloop. Monsel's and packing placed."  Packing was subsequently removed on POD 3. Since discharge (8/16) patient reports that the bleeding has stopped. She has not had any pelvic pain. She does report some black dry discharge with a foul odor.      Patient with fevers and chills but no sinus drainage, cough, N V D, hodge snot make any urine, no skin breakdown.  Just on dialysis and the graft worked fine.  No abd pain with recent LEEP and fibroid embolization       Interval History: No " new issues - plans to go home in AM     Review of Systems       Constitutional: Negative today .   HENT: Negative.    Eyes: Negative.    Respiratory: Negative.    Gastrointestinal: Negative.    Endocrine: Negative.    Genitourinary: Negative.    Musculoskeletal: Negative.    Allergic/Immunologic: Negative.    Neurological: Negative.    Hematological: Negative.    Objective:     Vital Signs (Most Recent):  Temp: 98.1 °F (36.7 °C) (09/03/18 1549)  Pulse: 87 (09/03/18 1549)  Resp: 18 (09/03/18 1549)  BP: (!) 100/55 (09/03/18 1549)  SpO2: 96 % (09/03/18 1549) Vital Signs (24h Range):  Temp:  [97.8 °F (36.6 °C)-99.9 °F (37.7 °C)] 98.1 °F (36.7 °C)  Pulse:  [87-96] 87  Resp:  [16-18] 18  SpO2:  [93 %-98 %] 96 %  BP: ()/(54-80) 100/55     Weight: 50.9 kg (112 lb 3.4 oz)  Body mass index is 18.67 kg/m².    Estimated Creatinine Clearance: 8.4 mL/min (A) (based on SCr of 8.1 mg/dL (H)).    Physical Exam     Constitutional: She is oriented to person, place, and time. She appears well-developed and well-nourished.   HENT:   Head: Normocephalic and atraumatic.   Eyes: Pupils are equal, round, and reactive to light.   Neck: Normal range of motion. Neck supple.   Cardiovascular: Normal rate and regular rhythm.   Pulmonary/Chest: Effort normal and breath sounds normal.   Abdominal: Soft. Bowel sounds are normal.   Musculoskeletal: Normal range of motion.   Neurological: She is alert and oriented to person, place, and time.   Skin: Skin is warm and dry.   Dialysis access on left forearm no pain or erythema  Significant Labs: All pertinent labs within the past 24 hours have been reviewed.    Significant Imaging: I have reviewed all pertinent imaging results/findings within the past 24 hours.

## 2018-09-03 NOTE — PLAN OF CARE
"Problem: Fall Risk (Adult)  Intervention: Safety Promotion/Fall Prevention   09/03/18 1826   Safety Interventions   Safety Promotion/Fall Prevention side rails raised x 3;Fall Risk signage in place;family to remain at bedside;Fall Risk reviewed with patient/family;nonskid shoes/socks when out of bed;muscle strengthening facilitated         Problem: Patient Care Overview  Goal: Plan of Care Review  Outcome: Ongoing (interventions implemented as appropriate)   09/03/18 1826   Coping/Psychosocial   Plan Of Care Reviewed With patient;mother       Pt resting in bed, up adlib in room without difficulty, pt's mother had requested staff assistance earlier to ambulate with pt due to "dizziness", pt ambulated without difficulty in hallways with mom, denies any vertigo, dizziness, or lightheadedness at this time, VSS, denies pain or nausea, remains afebrile, POC reviewed with pt and family, pt denies further needs at this time, call light within reach, will continue to monitor.       "

## 2018-09-04 VITALS
SYSTOLIC BLOOD PRESSURE: 137 MMHG | HEART RATE: 92 BPM | RESPIRATION RATE: 18 BRPM | DIASTOLIC BLOOD PRESSURE: 85 MMHG | OXYGEN SATURATION: 97 % | HEIGHT: 65 IN | WEIGHT: 112.19 LBS | TEMPERATURE: 98 F | BODY MASS INDEX: 18.69 KG/M2

## 2018-09-04 LAB
ALBUMIN SERPL BCP-MCNC: 2.1 G/DL
ALP SERPL-CCNC: 410 U/L
ALT SERPL W/O P-5'-P-CCNC: 28 U/L
ANION GAP SERPL CALC-SCNC: 12 MMOL/L
AST SERPL-CCNC: 26 U/L
BASOPHILS # BLD AUTO: 0.01 K/UL
BASOPHILS NFR BLD: 0.2 %
BILIRUB SERPL-MCNC: 0.7 MG/DL
BUN SERPL-MCNC: 58 MG/DL
CALCIUM SERPL-MCNC: 8.2 MG/DL
CHLORIDE SERPL-SCNC: 101 MMOL/L
CO2 SERPL-SCNC: 22 MMOL/L
CREAT SERPL-MCNC: 9.5 MG/DL
DIFFERENTIAL METHOD: ABNORMAL
EOSINOPHIL # BLD AUTO: 0.4 K/UL
EOSINOPHIL NFR BLD: 10.1 %
ERYTHROCYTE [DISTWIDTH] IN BLOOD BY AUTOMATED COUNT: 17.1 %
EST. GFR  (AFRICAN AMERICAN): 5.9 ML/MIN/1.73 M^2
EST. GFR  (NON AFRICAN AMERICAN): 5.1 ML/MIN/1.73 M^2
GLUCOSE SERPL-MCNC: 93 MG/DL
HCT VFR BLD AUTO: 24.4 %
HGB BLD-MCNC: 8.1 G/DL
IMM GRANULOCYTES # BLD AUTO: 0.08 K/UL
IMM GRANULOCYTES NFR BLD AUTO: 1.9 %
INR PPP: 1.1
LYMPHOCYTES # BLD AUTO: 1.1 K/UL
LYMPHOCYTES NFR BLD: 25.8 %
MAGNESIUM SERPL-MCNC: 2 MG/DL
MCH RBC QN AUTO: 27.5 PG
MCHC RBC AUTO-ENTMCNC: 33.2 G/DL
MCV RBC AUTO: 83 FL
MONOCYTES # BLD AUTO: 0.3 K/UL
MONOCYTES NFR BLD: 7.7 %
NEUTROPHILS # BLD AUTO: 2.3 K/UL
NEUTROPHILS NFR BLD: 54.3 %
NRBC BLD-RTO: 0 /100 WBC
PHOSPHATE SERPL-MCNC: 4.1 MG/DL
PLATELET # BLD AUTO: 66 K/UL
PMV BLD AUTO: ABNORMAL FL
POTASSIUM SERPL-SCNC: 4.5 MMOL/L
PROT SERPL-MCNC: 6.5 G/DL
PROTHROMBIN TIME: 11.1 SEC
RBC # BLD AUTO: 2.95 M/UL
SODIUM SERPL-SCNC: 135 MMOL/L
TACROLIMUS BLD-MCNC: 9 NG/ML
VANCOMYCIN SERPL-MCNC: 16.9 UG/ML
WBC # BLD AUTO: 4.27 K/UL

## 2018-09-04 PROCEDURE — 90935 HEMODIALYSIS ONE EVALUATION: CPT | Mod: GC,NTX,, | Performed by: INTERNAL MEDICINE

## 2018-09-04 PROCEDURE — 25000003 PHARM REV CODE 250: Mod: NTX | Performed by: HOSPITALIST

## 2018-09-04 PROCEDURE — 80053 COMPREHEN METABOLIC PANEL: CPT | Mod: NTX

## 2018-09-04 PROCEDURE — 99238 HOSP IP/OBS DSCHRG MGMT 30/<: CPT | Mod: NTX,,, | Performed by: HOSPITALIST

## 2018-09-04 PROCEDURE — 80202 ASSAY OF VANCOMYCIN: CPT | Mod: NTX

## 2018-09-04 PROCEDURE — 84100 ASSAY OF PHOSPHORUS: CPT | Mod: NTX

## 2018-09-04 PROCEDURE — 90935 HEMODIALYSIS ONE EVALUATION: CPT | Mod: NTX

## 2018-09-04 PROCEDURE — 25000003 PHARM REV CODE 250: Mod: NTX | Performed by: GENERAL PRACTICE

## 2018-09-04 PROCEDURE — 85025 COMPLETE CBC W/AUTO DIFF WBC: CPT | Mod: NTX

## 2018-09-04 PROCEDURE — 83735 ASSAY OF MAGNESIUM: CPT | Mod: NTX

## 2018-09-04 PROCEDURE — 85610 PROTHROMBIN TIME: CPT | Mod: NTX

## 2018-09-04 PROCEDURE — 80197 ASSAY OF TACROLIMUS: CPT | Mod: NTX

## 2018-09-04 PROCEDURE — 36415 COLL VENOUS BLD VENIPUNCTURE: CPT | Mod: NTX

## 2018-09-04 PROCEDURE — 63600175 PHARM REV CODE 636 W HCPCS: Mod: NTX | Performed by: HOSPITALIST

## 2018-09-04 RX ORDER — TACROLIMUS 1 MG/1
CAPSULE ORAL
Qty: 450 CAPSULE | Refills: 0 | Status: SHIPPED | OUTPATIENT
Start: 2018-09-04 | End: 2018-01-01 | Stop reason: SDUPTHER

## 2018-09-04 RX ORDER — PREDNISONE 1 MG/1
TABLET ORAL
Qty: 30 TABLET | Refills: 1 | Status: ON HOLD | OUTPATIENT
Start: 2018-09-04 | End: 2018-01-01 | Stop reason: DRUGHIGH

## 2018-09-04 RX ORDER — MYCOPHENOLATE MOFETIL 250 MG/1
CAPSULE ORAL
Qty: 240 CAPSULE | Refills: 1 | Status: ON HOLD | OUTPATIENT
Start: 2018-09-04 | End: 2018-01-01 | Stop reason: SDUPTHER

## 2018-09-04 RX ADMIN — VANCOMYCIN HYDROCHLORIDE 500 MG: 500 INJECTION, POWDER, LYOPHILIZED, FOR SOLUTION INTRAVENOUS at 01:09

## 2018-09-04 RX ADMIN — ASPIRIN 81 MG CHEWABLE TABLET 81 MG: 81 TABLET CHEWABLE at 11:09

## 2018-09-04 RX ADMIN — CINACALCET HYDROCHLORIDE 30 MG: 30 TABLET, COATED ORAL at 11:09

## 2018-09-04 RX ADMIN — SODIUM CHLORIDE 300 ML: 0.9 INJECTION, SOLUTION INTRAVENOUS at 09:09

## 2018-09-04 RX ADMIN — FAMOTIDINE 20 MG: 20 TABLET ORAL at 11:09

## 2018-09-04 RX ADMIN — CITALOPRAM HYDROBROMIDE 20 MG: 10 TABLET ORAL at 11:09

## 2018-09-04 NOTE — ASSESSMENT & PLAN NOTE
"ESRD on IHD TTS   Out patient HD Center -  [facility] / Dr. Ferrara  Duration of outpatient dialysis session - 3.5 hrs  EDW: 50 kg     End-Stage Renal Disease on HD  - Will provide dialysis for metabolic clearance and volume management  - Seen and examined today during hemodialysis; tolerating treatment well without issues. Denied headaches, chest pain, abdominal pain, or muscle cramps   - Target ultrafiltration to EDW, ~2L  - Dialysate adjusted to current labs   - Avoid nephrotoxic medications  - Medications to renal parameters  - Strict Input and Output and chart  - Daily standing weights    Bacteremia by MSSA:  - Will be discharged on IV Vancomycin as per ID recommendations "Keep on vanc even though MSSA due to ease of treatment - will need a total of 2 weeks from 9-1-18 - so until 9-16-18"     Anemia of Chronic Kidney Disease   - Hb > 7 gm/dL    Mineral Bone Disease in CKD   - Renal Function Panel Daily for electrolytes monitoring    HTN   - Stable BP, will continue to monitor. Goal for BP is <140 mmHg SBP and BDP <90 mmHg, maintain MAP > 65.    Nutrition   - Renal Diet    Case discussed with staff further recs with attestation.  "

## 2018-09-04 NOTE — PROGRESS NOTES
[facility]  Nephrology  Progress Note    Patient Name: Holly Patel  MRN: [id number]  Admission Date: 8/31/2018  Hospital Length of Stay: 4 days  Attending Provider: Jeancarlos Banks MD   Primary Care Physician: Stan Sosa MD  Principal Problem:Sepsis    Subjective:     HPI: No notes on file    Interval History:  Patient evaluated bedside at the HD unit found in no acute distress, tolerating well procedure.  No adverse events during the night.    Review of patient's allergies indicates:   Allergen Reactions    Chloral hydrate      Other reaction(s): Hallucinations  Other reaction(s): Hives    Hydrocodone Other (See Comments)     Mental status changes     Current Facility-Administered Medications   Medication Frequency    0.9%  NaCl infusion (for blood administration) Q24H PRN    0.9%  NaCl infusion PRN    acetaminophen tablet 650 mg Q8H PRN    aspirin chewable tablet 81 mg Daily    cinacalcet tablet 30 mg Every Tues, Thurs, Sat    citalopram tablet 20 mg Daily    cloNIDine 0.3 mg/24 hr td ptwk 1 patch Q7 Days    famotidine tablet 20 mg Daily    hydrALAZINE tablet 100 mg Q12H    irbesartan tablet 300 mg Daily    labetalol tablet 500 mg Q12H    NIFEdipine 24 hr tablet 120 mg Daily    ondansetron disintegrating tablet 8 mg Q8H PRN    sodium chloride 0.9% flush 5 mL PRN    [START ON 9/5/2018] tacrolimus capsule 6 mg Daily    And    tacrolimus capsule 6 mg Daily    vancomycin (VANCOCIN) 500 mg in dextrose 5 % 100 mL IVPB Once       Objective:     Vital Signs (Most Recent):  Temp: 98.2 °F (36.8 °C) (09/04/18 1108)  Pulse: 92 (09/04/18 1200)  Resp: 18 (09/04/18 1108)  BP: 137/85 (09/04/18 1108)  SpO2: 97 % (09/04/18 1108)  O2 Device (Oxygen Therapy): room air (09/04/18 1108) Vital Signs (24h Range):  Temp:  [97.5 °F (36.4 °C)-98.2 °F (36.8 °C)] 98.2 °F (36.8 °C)  Pulse:  [83-94] 92  Resp:  [16-18] 18  SpO2:  [18 %-97 %] 97 %  BP: (100-153)/(55-91) 137/85     Weight: 50.9 kg  (112 lb 3.4 oz) (08/31/18 2230)  Body mass index is 18.67 kg/m².  Body surface area is 1.53 meters squared.    No intake/output data recorded.    Physical Exam  Constitutional: Appears well-developed and well-nourished.   Head: Normocephalic and atraumatic.   Mouth/Throat: Oropharynx is clear and moist.   Eyes: EOM are normal. Pupils are equal, round, and reactive to light. No scleral icterus.   Neck: Normal range of motion. Neck supple.   Cardiovascular: Normal rate and regular rhythm.  No murmur heard.  Pulmonary/Chest: Effort normal and breath sounds normal. No respiratory distress. No wheezes, rales, or rhonchi  Abdominal: Soft. Bowel sounds are normal.  No distension or tenderness  Musculoskeletal: Normal range of motion. No edema. LUE Buttonhole fistula with good flow/thrill  Neurological: Alert and oriented to person, place, and time.   Skin: Skin is warm and dry.   Psychiatric: Normal mood and affect. Behavior is normal.       Significant Labs:  CBC:   Recent Labs   Lab  09/04/18   0338   WBC  4.27   RBC  2.95*   HGB  8.1*   HCT  24.4*   PLT  66*   MCV  83   MCH  27.5   MCHC  33.2     CMP:   Recent Labs   Lab  09/04/18   0338   GLU  93   CALCIUM  8.2*   ALBUMIN  2.1*   PROT  6.5   NA  135*   K  4.5   CO2  22*   CL  101   BUN  58*   CREATININE  9.5*   ALKPHOS  410*   ALT  28   AST  26   BILITOT  0.7     All labs within the past 24 hours have been reviewed.         Assessment/Plan:     ESRD on hemodialysis    ESRD on IHD TTS   Out patient HD Center -  [facility] / Dr. Ferrara  Duration of outpatient dialysis session - 3.5 hrs  EDW: 50 kg     End-Stage Renal Disease on HD  - Will provide dialysis for metabolic clearance and volume management  - Seen and examined today during hemodialysis; tolerating treatment well without issues. Denied headaches, chest pain, abdominal pain, or muscle cramps   - Target ultrafiltration to EDW, ~2L  - Dialysate adjusted to current labs   - Avoid nephrotoxic  "medications  - Medications to renal parameters  - Strict Input and Output and chart  - Daily standing weights    Bacteremia by MSSA:  - Will be discharged on IV Vancomycin as per ID recommendations "Keep on vanc even though MSSA due to ease of treatment - will need a total of 2 weeks from 9-1-18 - so until 9-16-18"     Anemia of Chronic Kidney Disease   - Hb > 7 gm/dL    Mineral Bone Disease in CKD   - Renal Function Panel Daily for electrolytes monitoring    HTN   - Stable BP, will continue to monitor. Goal for BP is <140 mmHg SBP and BDP <90 mmHg, maintain MAP > 65.    Nutrition   - Renal Diet    Case discussed with staff further recs with attestation.            Thank you for your consult. I will follow-up with patient. Please contact us if you have any additional questions.    Carl Kennedy MD  Nephrology  [facility]    ATTENDING PHYSICIAN ATTESTATION  I have personally interviewed and examined the patient. I thoroughly reviewed the demographic, clinical, laboratorial and imaging information available in medical records. I agree with the assessment and recommendations provided by the subspecialty resident. Dr. Castellon was under my supervision.     HEMODIALYSIS NOTE  Patient evaluated while undergoing hemodialysis indicated for ESRD. Tolerating session with current UFR, no complications.   "

## 2018-09-04 NOTE — PROGRESS NOTES
Dialysis completed. Needles removed from left forearm buttonhole fistula. Pressure applied for 5 minutes each with hemostasis achieved. Gauze and tape to sites. Patient dialyzed for 3 hours with fluid removal of 2 liters. Tolerated well with stable vital signs.

## 2018-09-04 NOTE — PLAN OF CARE
Stan Sosa MD  [address] / [place] [address]    Future Appointments   Date Time Provider Department Center   9/24/2018  9:00 AM Jennifer Ireland PA-C [facility] IM [facility]   9/26/2018  8:00 AM KIDNEY, TRANSPLANT [facility] KIDNTX [facility]   10/1/2018  8:15 AM LAB, [facility]   11/26/2018 12:20 PM Stan Sosa MD [facility] [address] [facility]         [id number] - [place], [place] - [address]  [address]  [place] [address]  Phone: [phone] Fax: [phone]    [facility]  [address]  [place] [address]  Phone: [phone] Fax: [phone]      Payor: MEDICARE / Plan: MEDICARE PART A & B / Product Type: [facility] /        09/04/18 1507   Discharge Assessment   Assessment Type Discharge Planning Assessment   Confirmed/corrected address and phone number on facesheet? Yes   Assessment information obtained from? Patient;Caregiver   Expected Length of Stay (days) 5   Communicated expected length of stay with patient/caregiver yes   Prior to hospitilization cognitive status: Alert/Oriented   Prior to hospitalization functional status: Independent   Current cognitive status: Alert/Oriented   Current Functional Status: Independent   Lives With parent(s);child(ester), dependent   Able to Return to Prior Arrangements yes   Is patient able to care for self after discharge? Yes   Who are your caregiver(s) and their phone number(s)? (Myrna Randolph mother [phone] )   Patient's perception of discharge disposition home or selfcare   Equipment Currently Used at Home none   Do you have any problems affording any of your prescribed medications? TBD   Does the patient have transportation home? Yes   Transportation Available family or friend will provide   Dialysis Name and Scheduled days ([facility]TS 0700 )   Discharge Plan A Home with family   Discharge Plan B Home with family;Home Health   Patient/Family In Agreement With Plan yes

## 2018-09-04 NOTE — PLAN OF CARE
Problem: Patient Care Overview  Goal: Plan of Care Review  Outcome: Ongoing (interventions implemented as appropriate)  No acute events tonight, pt is AAOx4, VSS all night. Pt resting in bed, up adlib in room without difficulty. Pt denies pain or nausea, remains afebrile, POC reviewed with pt and family;pt needs 2 weeks of vanc per ID with end date of 9/16. HD nurse called for report and pt is going to HD in the AM. Told HD nurse to hold pt's antihypertensive before dialysis because day shift RN and patient both stated pt tend to drop in BP after dialysis and will bottom out if she take her antihypertensives before dialysis. Per Dr. Chicas her scheduled vanc need to be given after dialysis this was passed on to the HD nurse and will also pass on to upcoming day shift RN. Pt is planned to D/C after HD and vanc administration. pt denies further needs at this time, call light within reach, will continue to monitor.

## 2018-09-04 NOTE — NURSING
Pt discharged home. Discharging with Vancomycin for 2 weeks per HD. Spoke to Naya with Dr Maya, states Vanc prescription was faxed to Dialysis and pt is good to go. Discharge instructions, meds, follow up appointments discussed with pt. Verbalized understanding. All questions answered. No complaints. Condition stable. Tele monitor removed and placed in the tele bin at the nurses station. PIV removed without complications. Declined transport- pt's mother brought wheelchair up to room and is bringing her down.     Pt's phone number- [phone]

## 2018-09-04 NOTE — SUBJECTIVE & OBJECTIVE
Interval History:  Patient evaluated bedside at the HD unit found in no acute distress, tolerating well procedure.  No adverse events during the night.    Review of patient's allergies indicates:   Allergen Reactions    Chloral hydrate      Other reaction(s): Hallucinations  Other reaction(s): Hives    Hydrocodone Other (See Comments)     Mental status changes     Current Facility-Administered Medications   Medication Frequency    0.9%  NaCl infusion (for blood administration) Q24H PRN    0.9%  NaCl infusion PRN    acetaminophen tablet 650 mg Q8H PRN    aspirin chewable tablet 81 mg Daily    cinacalcet tablet 30 mg Every Tues, Thurs, Sat    citalopram tablet 20 mg Daily    cloNIDine 0.3 mg/24 hr td ptwk 1 patch Q7 Days    famotidine tablet 20 mg Daily    hydrALAZINE tablet 100 mg Q12H    irbesartan tablet 300 mg Daily    labetalol tablet 500 mg Q12H    NIFEdipine 24 hr tablet 120 mg Daily    ondansetron disintegrating tablet 8 mg Q8H PRN    sodium chloride 0.9% flush 5 mL PRN    [START ON 9/5/2018] tacrolimus capsule 6 mg Daily    And    tacrolimus capsule 6 mg Daily    vancomycin (VANCOCIN) 500 mg in dextrose 5 % 100 mL IVPB Once       Objective:     Vital Signs (Most Recent):  Temp: 98.2 °F (36.8 °C) (09/04/18 1108)  Pulse: 92 (09/04/18 1200)  Resp: 18 (09/04/18 1108)  BP: 137/85 (09/04/18 1108)  SpO2: 97 % (09/04/18 1108)  O2 Device (Oxygen Therapy): room air (09/04/18 1108) Vital Signs (24h Range):  Temp:  [97.5 °F (36.4 °C)-98.2 °F (36.8 °C)] 98.2 °F (36.8 °C)  Pulse:  [83-94] 92  Resp:  [16-18] 18  SpO2:  [18 %-97 %] 97 %  BP: (100-153)/(55-91) 137/85     Weight: 50.9 kg (112 lb 3.4 oz) (08/31/18 2230)  Body mass index is 18.67 kg/m².  Body surface area is 1.53 meters squared.    No intake/output data recorded.    Physical Exam  Constitutional: Appears well-developed and well-nourished.   Head: Normocephalic and atraumatic.   Mouth/Throat: Oropharynx is clear and moist.   Eyes: EOM are  normal. Pupils are equal, round, and reactive to light. No scleral icterus.   Neck: Normal range of motion. Neck supple.   Cardiovascular: Normal rate and regular rhythm.  No murmur heard.  Pulmonary/Chest: Effort normal and breath sounds normal. No respiratory distress. No wheezes, rales, or rhonchi  Abdominal: Soft. Bowel sounds are normal.  No distension or tenderness  Musculoskeletal: Normal range of motion. No edema. LUE Buttonhole fistula with good flow/thrill  Neurological: Alert and oriented to person, place, and time.   Skin: Skin is warm and dry.   Psychiatric: Normal mood and affect. Behavior is normal.       Significant Labs:  CBC:   Recent Labs   Lab  09/04/18   0338   WBC  4.27   RBC  2.95*   HGB  8.1*   HCT  24.4*   PLT  66*   MCV  83   MCH  27.5   MCHC  33.2     CMP:   Recent Labs   Lab  09/04/18   0338   GLU  93   CALCIUM  8.2*   ALBUMIN  2.1*   PROT  6.5   NA  135*   K  4.5   CO2  22*   CL  101   BUN  58*   CREATININE  9.5*   ALKPHOS  410*   ALT  28   AST  26   BILITOT  0.7     All labs within the past 24 hours have been reviewed.

## 2018-09-04 NOTE — PHYSICIAN QUERY
"PT Name: Holly Patel  MR #: [id number]    Physician Query Form - Pneumonia Clarification     CDS/: AILYN Weber, RN, CCDS               Contact information: [email or web address]  This form is a permanent document in the medical record.    Query Date:  September 4, 2018    By submitting this query, we are merely seeking further clarification of documentation. Please utilize your independent clinical judgment when addressing the question(s) below.    The Medical record contains the following:   Indicators   Supporting Clinical Findings Location in Medical Record   X "Pneumonia" documented currently admitted with sepsis possibly due to pneumonia    Pneumonia of right lower lobe due to infectious organism   Hepatology consult: 9/1    H & P: 8/31   X Chest X-Ray: Radiologist interpretation of CXR is LLL infiltrate suspicious for aspiration/pneumonia. However, patient denies any cough, productive sputum, dyspnea, pleuritic pain.    H & P: 8/31    PaO2    PaCO2     O2 sat     X Cultures  blood cultures came back positive for gram positive bacteremia; on vanc    Sepsis due to MSSA bacteremia  Repeat blood cultures NGTD   PN: 9/1    PN: 9/3   X Treatment  Started on Cefepime, Vanco.    H & P: 8/31    Supplemental O2      Other         Provider, please specify type of pneumonia.    [  ] Bacterial Pneumonia (Specify organism): ______________________  [  ] Bacterial, Gram Negative organism Pneumonia  [  ] Aspiration Pneumonia  [  ] Other type of pneumonia (please specify): ______________________________________  [  ] Clinically undetermined    Please document in your progress notes daily for the duration of treatment, until resolved, and include in your discharge summary.    .                                                       Patient does not have PNA, her MSSA bacteremia likely from recent GYN procedure                              "

## 2018-09-04 NOTE — PLAN OF CARE
3:44 PM  OFELIA sent email to CM informing her of the note documented below.    3:23 PM  SW phoned [facility] [place] [facility] [phone], rep back for the [phone] [place] [address], [facility] [place].    No one answered the phone at [phone] and fax #[phone] provided by the first rep.  Rep Ami reported she has the same #'s and phoned the # stated above.  She reported she could not get an answer either.    This Wker faxed the script to fax # stated but could not get a conformation that it was received from [facility] [place] rep.  SW received fax confo that it went through w/out incident.    OFELIA following for DC needs.  OFELIA in communication with YEFRI.    Silvana Patricio, DOTTIE  [facility]

## 2018-09-04 NOTE — PHYSICIAN QUERY
PT Name: Holly Patel  MR #: [id number]    Physician Query Form - Nutrition Clarification     CDS/: AILYN Weber, RN, CCDS               Contact information: [email or web address]    This form is a permanent document in the medical record.     Query Date: September 4, 2018    By submitting this query, we are merely seeking further clarification of documentation.. Please utilize your independent clinical judgment when addressing the question(s) below.    The Medical record contains the following:   Indicators  Supporting Clinical Findings Location in Medical Record    % of Estimated Energy Intake over a time frame from p.o., TF, or TPN      Weight Status over a time frame      Subcutaneous Fat and/or Muscle Loss      Fluid Accumulation or Edema      Reduced  Strength     X Wt / BMI / Usual Body Weight Body mass index is 18.67 kg/m².   PN: 9/4    Delayed Wound Healing / Failure to Thrive     X Acute or Chronic Illness CHF, ESRD on dialysis, anemia, mineral bone disease, sepsis   PN: 9/4    Medication      Treatment     X Other Moderate protein-calorie malnutrition   PN: PMH: 9/1     AND / ASPEN Clinical Characteristics (October 2011)  A minimum of two characteristics is recommended for diagnosing either moderate or severe malnutrition   Mild Malnutrition Moderate Malnutrition Severe Malnutrition   Energy Intake from p.o., TF or TPN. < 75% intake of estimated energy needs for less than 7 days < 75% intake of estimated energy needs for greater than 7 days < 50% intake of estimated energy needs for > 5 days   Weight Loss 1-2% in 1 month  5% in 3 months  7.5% in 6 months  10% in 1 year 1-2 % in 1 week  5% in 1 month  7.5% in 3 months  10% in 6 months  20% in 1 year > 2% in 1 week  > 5% in 1 month  > 7.5% in 3 months  > 10% in 6 months  > 20% in 1 year   Physical Findings     None *Mild subcutaneous fat and/or muscle loss  *Mild fluid accumulation  *Stage II decubitus  *Surgical wound or non-healing  wound *Mod/severe subcutaneous fat and/or muscle loss  *Mod/severe fluid accumulation  *Stage III or IV decubitus  *Non-healing surgical wound     Provider, please specify diagnosis or diagnoses associated with above clinical findings.    [ ] Mild Protein-Calorie Malnutrition  [x ] Moderate Protein-Calorie Malnutrition  [ ] Other Nutritional Diagnosis (please specify): ____________________________________  [ ] Clinically Undetermined    Please document in your progress notes daily for the duration of treatment until resolved and include in your discharge summary.

## 2018-09-04 NOTE — PLAN OF CARE
10:44 AM   SW spoke w/Pt's RN Ashlee [id number], reported Pt does not have PIC line and believes vanc will be given w/dialysis.  SW will f/u w/CM.    10:43 AM  SW went to Pt's bedside, Pt at dialysis.  SW spoke w/Pt's mother, reported her preference for hh is [facility] and no preference for infusion company if needed.        Silvana Patricio, DOTTIE  [facility]

## 2018-09-04 NOTE — DISCHARGE SUMMARY
"Discharge Summary  Hospital Medicine    Attending Provider on Discharge: Jeancarlos Banks MD  [facility] Medicine Team: [facility]  Date of Admission:  8/31/2018     Date of Discharge:  09/04/2018  Code status: Full Code    Active Hospital Problems    Diagnosis  POA    *Sepsis [A41.9]  Yes    Fever [R50.9]  Yes    Gram-positive bacteremia [R78.81]  Yes    Hyponatremia [E87.1]  Yes    Pneumonia of right lower lobe due to infectious organism [J18.1]  Yes    Hypertensive emergency [I16.1]  Yes    Thrombocytopenia [D69.6]  Yes    Immunosuppressed [D89.9]  Yes     Chronic    Secondary hyperparathyroidism [N25.81]  Yes    Anemia in ESRD (end-stage renal disease) [N18.6, D63.1]  Yes     Chronic    Renovascular hypertension [I15.0]  Yes     Chronic    ESRD on hemodialysis [N18.6, Z99.2]  Not Applicable     Chronic    Prophylactic immunotherapy [Z29.8]  Not Applicable   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 [Z94.4]  Not Applicable     Chronic     hemangioendothelioma s/p LTx (1992)        Resolved Hospital Problems   No resolved problems to display.        HPI    28 y/o female with ESRD (on HD MWF), h/o liver transplant (on immunosuppression with prograf and prednisone), poorly controlled HTN with medication non-compliance, diastolic HF, and anemia from chronic kidney disease and blood loss (s/p LEEP). She presented to the ED today for fever.  Patient states last night she had fever T-max of 104.3°  and attempted to take Children's Tylenol with no relief of symptoms. Patient states today her fever was 104°. Patient endorses generalized body aches that she awoke with yesterday but denies any cough, congestion, shortness of breath or chest pain. She is anuric.      Notably, she had a LEEP procedure in June 2018 which was complicated by persistent vaginal bleeding and multiple admissions for anemia. Per previous d/c summ: "On 8/8, they were able to perform an EUA where they noted "cervix visualized with clot in place, " "no active bleeding. Sutures from prior surgeries remain visible and intact. Insufficient cervix for Eddoloop. Monsel's and packing placed."  Packing was subsequently removed on POD 3. Since discharge (8/16) patient reports that the bleeding has stopped. She has not had any pelvic pain. She does report some black dry discharge with a foul odor.       Hospital Course    # Sepsis due to MSSA bacteremia   - possibly due to recent gyn procedure vs Fistula  - cont vanc for now,renally dose, daily vanc levels   - repeat blood cultures NGTD and get 2d echo reviewed   - transplant ID consulted and appreciate recs  - as per ID, patient needs 2 weeks of Vanc with HD, stop date of 9/16, script sent to patient's Dialysis unit      # Anemia of ESRD  # Thrombocytopenia  - transfused 1 unit of PRBC on 9/1 with good response      # H/O Liver transplant  # Immunosuppressed status  - hepatology consulted  - cont prograf and prednisone, prograf levels stable  - cont prograf at 6mg BID   - hold Cellcept and prednisone until after antibiotics are finished     # ESRD on HD  - nephrology consulted; HD as per them     # Renovascular HTN  - cont BP meds     # Hyponatremia  - correct with HD        Recent Labs   Lab  09/02/18 0341 09/03/18   0325 09/04/18   0338   WBC  5.56  4.87  4.27   HGB  8.0*  8.1*  8.1*   HCT  23.8*  24.4*  24.4*   PLT  48*  58*  66*     Recent Labs   Lab  09/02/18 0341 09/03/18 0325 09/04/18   0338   NA  133*  132*  135*   K  4.1  4.0  4.5   CL  99  98  101   CO2  24  22*  22*   BUN  37*  50*  58*   CREATININE  6.6*  8.1*  9.5*   GLU  105  117*  93   CALCIUM  7.3*  7.7*  8.2*   MG  1.8  2.0  2.0   PHOS  2.9  3.6  4.1     Recent Labs   Lab  09/02/18 0341 09/03/18 0325 09/04/18   0338   ALKPHOS  443*  421*  410*   ALT  39  32  28   AST  36  25  26   ALBUMIN  2.2*  2.2*  2.1*   PROT  6.5  6.7  6.5   BILITOT  1.1*  0.8  0.7   INR  1.2  1.1  1.1      No results for input(s): POCTGLUCOSE in the last 168 " hours.  No results for input(s): CPK, CPKMB, MB, TROPONINI in the last 72 hours.    No results for input(s): LACTATE in the last 72 hours.     Procedures: none     Consultants: Hepatology, infectious diseases, nephrology     Current Discharge Medication List      START taking these medications    Details   vancomycin HCl (VANCOMYCIN 1 G/250 ML D5W, READY TO MIX SYSTEM,) Inject 125 mLs (500 mg total) into the vein every Tues, Thurs, Sat. Administer after HD for 5 doses  Qty: 625 mL, Refills: 0         CONTINUE these medications which have CHANGED    Details   mycophenolate (CELLCEPT) 250 mg Cap Hold until completion of vancomycin antibiotic therapy on 9/16/2018  Qty: 240 capsule, Refills: 1      predniSONE (DELTASONE) 1 MG tablet Hold until completion of vancomycin antibiotic therapy on 9/16/18  Qty: 30 tablet, Refills: 1      tacrolimus (PROGRAF) 1 MG Cap Take 6 capsules (6mg) in the morning and 6 capsules (6mg) in the evening  Qty: 450 capsule, Refills: 0         CONTINUE these medications which have NOT CHANGED    Details   cinacalcet (SENSIPAR) 30 MG Tab Take 1 tablet (30 mg total) by mouth daily with breakfast.  Qty: 30 tablet, Refills: 11      citalopram (CELEXA) 20 MG tablet TAKE 1 TABLET BY MOUTH EVERY DAY  Qty: 30 tablet, Refills: 1      clindamycin (CLINDESSE) 2 % vaginal cream Place 1 full applicator nightly  Qty: 40 g, Refills: 3    Associated Diagnoses: Vaginal atrophy      cloNIDine 0.3 mg/24 hr td ptwk (CATAPRES) 0.3 mg/24 hr Place 1 patch onto the skin every 7 days.  Qty: 4 patch, Refills: 11    Associated Diagnoses: Renovascular hypertension      conjugated estrogens (PREMARIN) vaginal cream Regimen: 2g nightly for 1 week then 1g nightly for 1 week, then 0.5g M/W/F nights  Qty: 30 g, Refills: 11    Associated Diagnoses: Vaginal atrophy      famotidine (PEPCID) 40 MG tablet Take 0.5 tablets (20 mg total) by mouth once daily.  Qty: 15 tablet, Refills: 11    Associated Diagnoses: Hematemesis with nausea       hydrALAZINE (APRESOLINE) 100 MG tablet Take 1 tablet (100 mg total) by mouth every 12 (twelve) hours.  Qty: 60 tablet, Refills: 11      irbesartan (AVAPRO) 300 MG tablet Take 1 tablet (300 mg total) by mouth once daily.  Qty: 90 tablet, Refills: 3      labetalol (NORMODYNE) 100 MG tablet Take 5 tablets (500 mg total) by mouth every 12 (twelve) hours.  Qty: 300 tablet, Refills: 11      NIFEdipine (PROCARDIA-XL) 60 MG (OSM) 24 hr tablet Take 2 tablets (120 mg total) by mouth once daily.  Qty: 60 tablet, Refills: 11    Associated Diagnoses: Renovascular hypertension      ondansetron (ZOFRAN) 4 MG tablet Take 1 tablet (4 mg total) by mouth every 6 (six) hours as needed for Nausea.  Qty: 15 tablet, Refills: 0      triamcinolone acetonide 0.1% (KENALOG) 0.1 % ointment AAA on arms, legs, and neck bid x 1-2 wks then prn flares only  Qty: 80 g, Refills: 3    Associated Diagnoses: Atopic dermatitis      aspirin 81 MG Chew Take 1 tablet (81 mg total) by mouth once daily.  Refills: 0             Discharge Diet:renal diet with Normal Fluid intake of 1500 - 2000 mL per day    Activity: activity as tolerated    Discharge Condition: Stable    Disposition: Home or Self Care    Tests pending at the time of discharge: none      Time spent  on the discharge of the patient including review of hospital course with the patient. reviewing discharge medications and arranging follow-up care 35 minutes     Discharge examination Patient was seen and examined on the date of discharge and determined to be suitable for discharge.    Discharge plan and follow up:    Follow up as outpatient with hepatology and nephrology   Future Appointments   Date Time Provider Department [place]   9/24/2018  9:00 AM Jennifer Ireland PA-C [facility] SHADI DANIEL   9/26/2018  8:00 AM KIDNEY, TRANSPLANT [facility] JOCELYN Guardado   10/1/2018  8:15 AM LAB, [facility]   11/26/2018 12:20 PM Stan Sosa MD [facility] SHADI DANIEL       Provider  Jeancarlos FLORES  Francesco Kilpatrick MD  Staff Hospitalist  Department of Hospital Medicine  [facility]   [phone]

## 2018-09-04 NOTE — PROGRESS NOTES
Patient received from floor with report from EDMOND Ramirez. Maintenance dialysis began per orders via 15 gauge buttonhole fistula needles to left forearm fistula.

## 2018-09-04 NOTE — TREATMENT PLAN
Hepatology Immunosuppression Monitoring Note    Patient not seen as out of room during rounds.    Chart reviewed.    LFTs stable.  Synthetic function intact    Prograf trough level is 9    Recommendations:  Daily tacrolimus trough level  CMP and INR daily  I have reduced tacrolimus to 6mg BID     Ok for discharge today from our hepatology perspective    We will continue to monitor.  Please call with any questions.    Bentio Paredes MD  Gastroenterology Fellow ([id number]-VI)  Pager: [phone]

## 2018-09-05 ENCOUNTER — TELEPHONE (OUTPATIENT)
Dept: TRANSPLANT | Facility: CLINIC | Age: 28
End: 2018-09-05

## 2018-09-05 NOTE — TELEPHONE ENCOUNTER
----- Message from Piper Calero, RN sent at 9/4/2018  4:09 PM CDT -----  Regarding: Labs for discharge  Dr. Jose Antonio Harvey said she was going to touch base with you, but just in case she didn't .  Holly is supposed to be discharged today. She wanted labs at the end of the week. Thursday or Friday.  Discharging off of Cellcept and pred. Due to being on antibiotics  FK  6/6  Trough 3-5

## 2018-09-06 LAB
BACTERIA BLD CULT: NORMAL
BACTERIA BLD CULT: NORMAL

## 2018-09-06 NOTE — PLAN OF CARE
09/06/18 1228   Final Note   Assessment Type Final Discharge Note   Discharge Disposition Home   What phone number can be called within the next 1-3 days to see how you are doing after discharge? [phone]   Hospital Follow Up  Appt(s) scheduled? Yes   Discharge plans and expectations educations in teach back method with documentation complete? Yes     Resume HD seat and patient will receive Vancomycin with dialysis treatments.    Future Appointments   Date Time Provider Department Center   9/7/2018  8:15 AM LAB, [facility]   9/24/2018  9:00 AM Jennifer Ireland PA-C [facility] Herminio DANIEL   9/26/2018  8:00 AM KIDNEY, TRANSPLANT [facility] Herminio Guardado   10/1/2018  8:15 AM LAB, [facility]   11/26/2018 12:20 PM Stan Sosa MD [facility] Herm[facility] DANIEL

## 2018-09-07 ENCOUNTER — LAB VISIT (OUTPATIENT)
Dept: LAB | Facility: HOSPITAL | Age: 28
End: 2018-09-07
Attending: INTERNAL MEDICINE
Payer: MEDICARE

## 2018-09-07 DIAGNOSIS — Z94.4 LIVER TRANSPLANTED: ICD-10-CM

## 2018-09-07 LAB
ALBUMIN SERPL BCP-MCNC: 2.3 G/DL
ALP SERPL-CCNC: 581 U/L
ALT SERPL W/O P-5'-P-CCNC: 51 U/L
ANION GAP SERPL CALC-SCNC: 9 MMOL/L
AST SERPL-CCNC: 52 U/L
BASOPHILS # BLD AUTO: 0.01 K/UL
BASOPHILS NFR BLD: 0.3 %
BILIRUB SERPL-MCNC: 0.6 MG/DL
BUN SERPL-MCNC: 26 MG/DL
CALCIUM SERPL-MCNC: 7.5 MG/DL
CHLORIDE SERPL-SCNC: 105 MMOL/L
CO2 SERPL-SCNC: 25 MMOL/L
CREAT SERPL-MCNC: 6.3 MG/DL
DIFFERENTIAL METHOD: ABNORMAL
EOSINOPHIL # BLD AUTO: 0.4 K/UL
EOSINOPHIL NFR BLD: 9.4 %
ERYTHROCYTE [DISTWIDTH] IN BLOOD BY AUTOMATED COUNT: 17.2 %
EST. GFR  (AFRICAN AMERICAN): 9.6 ML/MIN/1.73 M^2
EST. GFR  (NON AFRICAN AMERICAN): 8.4 ML/MIN/1.73 M^2
GLUCOSE SERPL-MCNC: 114 MG/DL
HCT VFR BLD AUTO: 24.4 %
HGB BLD-MCNC: 7.7 G/DL
IMM GRANULOCYTES # BLD AUTO: 0.1 K/UL
IMM GRANULOCYTES NFR BLD AUTO: 2.5 %
LYMPHOCYTES # BLD AUTO: 0.7 K/UL
LYMPHOCYTES NFR BLD: 17.3 %
MCH RBC QN AUTO: 27.7 PG
MCHC RBC AUTO-ENTMCNC: 31.6 G/DL
MCV RBC AUTO: 88 FL
MONOCYTES # BLD AUTO: 0.2 K/UL
MONOCYTES NFR BLD: 5.6 %
NEUTROPHILS # BLD AUTO: 2.6 K/UL
NEUTROPHILS NFR BLD: 64.9 %
NRBC BLD-RTO: 0 /100 WBC
PLATELET # BLD AUTO: 124 K/UL
PMV BLD AUTO: 10.6 FL
POTASSIUM SERPL-SCNC: 4 MMOL/L
PROT SERPL-MCNC: 7.3 G/DL
RBC # BLD AUTO: 2.78 M/UL
SODIUM SERPL-SCNC: 139 MMOL/L
WBC # BLD AUTO: 3.93 K/UL

## 2018-09-07 PROCEDURE — 36415 COLL VENOUS BLD VENIPUNCTURE: CPT | Mod: PO,NTX

## 2018-09-07 PROCEDURE — 80197 ASSAY OF TACROLIMUS: CPT | Mod: NTX

## 2018-09-07 PROCEDURE — 85025 COMPLETE CBC W/AUTO DIFF WBC: CPT | Mod: NTX

## 2018-09-07 PROCEDURE — 80053 COMPREHEN METABOLIC PANEL: CPT | Mod: TXP

## 2018-09-08 LAB — TACROLIMUS BLD-MCNC: <1.5 NG/ML

## 2018-09-11 ENCOUNTER — TELEPHONE (OUTPATIENT)
Dept: TRANSPLANT | Facility: CLINIC | Age: 28
End: 2018-09-11

## 2018-09-11 NOTE — TELEPHONE ENCOUNTER
----- Message from Lei Pinedo MD sent at 9/10/2018  2:00 PM CDT -----  Results reviewed  Repeat in 2 weeks

## 2018-09-18 PROBLEM — R06.02 SHORTNESS OF BREATH: Status: ACTIVE | Noted: 2018-01-01

## 2018-09-18 NOTE — ED NOTES
Patient resting in stretcher at this time and NAD noted. Patient is A&Ox4 and following commands. Patient remains hypertensive and still reports blurred vision. Patient with call light within reach. Denies any pain at this time. All safety precautions in place. Denies any additional needs at this time. Will continue to monitor.

## 2018-09-18 NOTE — ED NOTES
Pt's first and last name and birthday confirmed.   LOC: The patient is awake, alert and aware of environment with an appropriate affect, the patient is oriented x 3 and speaking appropriately.  APPEARANCE: Patient resting comfortably and in no acute distress, patient is clean and well groomed.  SKIN: The skin is warm and dry, patient has normal skin turgor and dry mucus membranes, skin intact, no breakdown or brusing noted.  MUSKULOSKELETAL: Patient moving all extremities well, no obvious swelling or deformities noted.  RESPIRATORY: Airway is open and patent, respirations are spontaneous, patient has a normal effort and rate. Breath sounds are clear and equal bilaterally.  CARDIAC: Normal heart sounds. No peripheral edema. Bounding pulses noted. Dialysis shunt in left forearm with + thrill and bruit noted.   ABDOMEN: Soft and non tender to palpation, distention noted. Bowel sounds present. Pt does not make urine.   NEURO: No neuro deficits, hand grasp equal, no drift noted, no facial droop noted. Speech is clear. WISAM.

## 2018-09-18 NOTE — ASSESSMENT & PLAN NOTE
- BUN/Cr 45/8.7  - Nephrology recs no emergent dialysis needed at this time. Plan for HD on 9/19.

## 2018-09-18 NOTE — PROGRESS NOTES
09/18/18 1834   Vital Signs   BP (!) 192/125   MAP (mmHg) 155     CCS informed of above BP.  States to keep -190 and currently placing new orders

## 2018-09-18 NOTE — HPI
28 year old female with hx of ESRD (HD on Tues, Thurs, Sat), poorly controlled HTN, liver transplant at 1 year of age (currently on prograf and prednisone), HFpEF presents with onset of blurry vision when she woke up this morning. Pt went to dialysis today and was sent to ED due to elevated BP, SBP in 220s. Baseline -180. Pt is compliant with her home BP meds 0.3mg clonidine patch daily, labetalol 500mg bid, hydralazine 100mg q12, irbesartan 300mg daily, and nifedipine 120mg daily. She took off her clonidine patch yesterday due to itching and forgot to put it back on. Pt states her L eye is more blurry. She has an AV fistula to her LUE. Only other complaint is mild diffuse abdominal burning. Denies chest pain, shortness of breath, nausea, vomiting, dizziness. Pt had a recent admission for MSSA bacteremia with temp of 104.3, treated with 2 weeks of vancomycin. Of note, she had a LEEP in June 2018 that was complicated by persistent vaginal bleeding. Pt notes that the bleeding resolved after discharge and denies pelvic pain.      In ED, BP on arrival was 230/150, tachycardic to 120s-130s. Troponin elevated at 0.3 from patient's baseline of 0.03. BNP elevated as well at 1961 from previous of 1300. CXR negative for acute abnormality. EKG reveals sinus tachycardia. Pt required cardene infusion in the ED with improvement in her BP, persistent tachycardia.  She received IV labetalol with some improvement in heart rate to 110s.  When attempting to wean cardene, pt's BP increased back to >200 SBP.

## 2018-09-18 NOTE — ED NOTES
Pt c/o SOB and requesting oxygen. Breath sounds are clear and equal bilaterally, respirations are spontaneous with normal rate and effort. Pt continues to have sinus tachycardia. IGLESIA Izaguirre notified.

## 2018-09-18 NOTE — ED PROVIDER NOTES
Encounter Date: 9/18/2018       History     Chief Complaint   Patient presents with    Hypertension     Pt was at dialysis and had blurred vision accompanied by hypertension. Pt states she took her anti-hypertensive meds prior to dialysis. Pt has no other complaints.      28-year-old female with medical comorbidities significant for ESRD on HD T,Th, Sat; status post remote liver transplant on chronic immunosuppression, renovascular hypertension presents to the ED after she was noted to have elevated BP at dialysis this morning.  Patient complains of blurry vision.  She reports compliance with her BP medication.  She denies any further complaints including chest pain, shortness of breath, nausea, vomiting, dizziness.          Review of patient's allergies indicates:   Allergen Reactions    Chloral hydrate      Other reaction(s): Hallucinations  Other reaction(s): Hives    Hydrocodone Other (See Comments)     Mental status changes     Past Medical History:   Diagnosis Date    Anemia in ESRD (end-stage renal disease) 10/12/2015    dialysis tues, thursday, sat; access left arm    Chronic rejection of liver transplant 3/22/2016    Depression     Encounter for blood transfusion     ESRD on hemodialysis 9/30/2015    History of recent hospitalization 05/2018    pneumonia    History of splenomegaly 4/12/2016    Immunosuppressed 8/5/2017    Iron deficiency anemia secondary to inadequate dietary iron intake 8/16/2017    [facility]   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)    Moderate protein-calorie malnutrition 8/16/2017    MRSA bacteremia 8/6/2017    Pneumonia     Prophylactic immunotherapy 8/4/2014    Renovascular hypertension 10/2/2015    Secondary hyperparathyroidism 8/5/2017    Seizures     Sialadenitis 3/21/2018    Thrombocytopenia 4/12/2016     Past Surgical History:   Procedure Laterality Date    BIOPSY-LIVER  N/A 2017    Performed by [facility] at [facility] OR 2ND FLR    BIOPSY-LIVER N/A 3/22/2016    Performed by [facility] Diagnostic Provider at [facility] OR 2ND FLR     SECTION      x 2    CONIZATION OF CERVIX USING LOOP ELECTROSURGICAL EXCISION PROCEDURE (LEEP) N/A 6/15/2018    Procedure: CONIZATION-CERVICAL-LEEP;  Surgeon: Neelam Marroquin MD;  Location: [facility] OR;  Service: OB/GYN;  Laterality: N/A;    CONIZATION-CERVICAL-LEEP N/A 6/15/2018    Performed by Neelam Marroquin MD at [facility] OR    [id number]; upper extremity Left 2015    Performed by Idalia Diaz MD at [facility] OR 2ND FLR    EMBOLIZATION N/A 2018    Procedure: EMBOLIZATION, BLOOD VESSEL;  Surgeon: Aren Ramos MD;  Location: [facility] CATH LAB;  Service: Radiology;  Laterality: N/A;    EMBOLIZATION, BLOOD VESSEL N/A 2018    Performed by Aren Ramos MD at [facility] CATH LAB    Exam Under Anesthesia N/A 2018    Performed by [facility]am [facility]roquin MD at [facility] OR 2ND FLR    Exam under anesthesia N/A 2018    Performed by Neelam Marroquin MD at [facility] OR    Exam under anesthesia (ADD ON ) N/A 2018    Performed by NICKIE Alvarez MD at [facility] OR    Exam under anesthesia -cervical suturing  N/A 2018    Performed by Lei Sims III, MD at [facility] OR    EXAMINATION UNDER ANESTHESIA N/A 2018    Procedure: Exam under anesthesia;  Surgeon: Neelam Marroquin MD;  Location: [facility] OR;  Service: OB/GYN;  Laterality: N/A;    EXAMINATION UNDER ANESTHESIA N/A 2018    Procedure: Exam under anesthesia (ADD ON );  Surgeon: NICKIE Alvarez MD;  Location: [facility] OR;  Service: OB/GYN;  Laterality: N/A;  (ADD ON )    EXAMINATION UNDER ANESTHESIA N/A 2018    Procedure: Exam under anesthesia -cervical suturing ;  Surgeon: Lei Sims III, MD;  Location: [facility] OR;  Service: OB/GYN;  Laterality: N/A;    EXAMINATION UNDER ANESTHESIA N/A 2018    Procedure: Exam Under Anesthesia;   Surgeon: Neelam Marroquin MD;  Location: [facility] OR [facility] FLR;  Service: OB/GYN;  Laterality: N/A;  need endoloop  request 12 noon    FISTULOGRAM Left 12/4/2015    Performed by Idalia Diaz MD at [facility] CATH LAB    LIVER BIOPSY      LIVER TRANSPLANT  09/1992    PERINEORRHAPHY  6/19/2018    Procedure: SUTURE REPAIR,CERVIX;  Surgeon: Neelam Marroquin MD;  Location: [facility];  Service: OB/GYN;;    SUTURE REPAIR,CERVIX  6/19/2018    Performed by [facility]am [facility]quin MD at [facility] OR    TUBAL LIGATION  2010     Family History   Problem Relation Age of Onset    Hypertension Mother     Hypertension Father     Melanoma Neg Hx     Breast cancer Neg Hx     Colon cancer Neg Hx     Ovarian cancer Neg Hx      Social History     Tobacco Use    Smoking status: Never Smoker    Smokeless tobacco: Never Used   Substance Use Topics    Alcohol use: No    Drug use: No     Review of Systems   Constitutional: Negative for fever.   HENT: Negative for sore throat.    Eyes: Positive for visual disturbance.   Respiratory: Negative for shortness of breath.    Cardiovascular: Negative for chest pain.   Gastrointestinal: Negative for nausea.   Genitourinary: Negative for dysuria.   Musculoskeletal: Negative for back pain.   Skin: Negative for rash.   Neurological: Negative for dizziness and weakness.   Hematological: Does not bruise/bleed easily.       Physical Exam     Initial Vitals   BP Pulse Resp Temp SpO2   09/18/18 0827 09/18/18 0827 09/18/18 0826 09/18/18 0913 09/18/18 0827   (!) 230/149 (!) 119 16 98.6 °F (37 °C) 100 %      MAP       --                Physical Exam    Nursing note and vitals reviewed.  Constitutional: She appears well-developed and well-nourished. She is not diaphoretic.  Non-toxic appearance. She does not appear ill. No distress.   HENT:   Head: Normocephalic and atraumatic.   Neck: Neck supple.   Cardiovascular: Regular rhythm. Tachycardia present.  Exam reveals no gallop and no friction rub.    No  murmur heard.  AV fistula noted to distal left upper extremity.  No peripheral edema.    Pulmonary/Chest: Effort normal and breath sounds normal. No accessory muscle usage. No tachypnea. No respiratory distress. She has no decreased breath sounds. She has no wheezes. She has no rhonchi. She has no rales.   Abdominal: She exhibits no distension.   Musculoskeletal: Normal range of motion.   Neurological: She is alert.   Skin: Skin is warm and dry. No rash noted. No pallor.   Psychiatric: She has a normal mood and affect. Her behavior is normal.         ED Course   Critical Care  Date/Time: 9/18/2018 3:04 PM  Performed by: Emily Gavin PA-C  Authorized by: Chidi White MD   Direct patient critical care time: 10 minutes  Additional history critical care time: 5 minutes  Ordering / reviewing critical care time: 5 minutes  Documentation critical care time: 5 minutes  Consulting other physicians critical care time: 10 minutes  Consult with family critical care time: 5 minutes  Other critical care time: 5 minutes  Total critical care time (exclusive of procedural time) : 45 minutes  Critical care was necessary to treat or prevent imminent or life-threatening deterioration of the following conditions: cardiac failure.  Critical care was time spent personally by me on the following activities: development of treatment plan with patient or surrogate, discussions with consultants, interpretation of cardiac output measurements, evaluation of patient's response to treatment, examination of patient, ordering and performing treatments and interventions, ordering and review of radiographic studies, ordering and review of laboratory studies, re-evaluation of patient's condition and review of old charts.        Labs Reviewed   CBC W/ AUTO DIFFERENTIAL - Abnormal; Notable for the following components:       Result Value    RBC 3.27 (*)     Hemoglobin 9.0 (*)     Hematocrit 29.1 (*)     MCHC 30.9 (*)     RDW 18.6 (*)      Platelets 58 (*)     Lymph # 0.8 (*)     Eos # 0.7 (*)     Lymph% 15.6 (*)     Eosinophil% 13.5 (*)     All other components within normal limits   COMPREHENSIVE METABOLIC PANEL - Abnormal; Notable for the following components:    BUN, Bld 45 (*)     Creatinine 8.7 (*)     Albumin 2.9 (*)     Total Bilirubin 1.2 (*)     Alkaline Phosphatase 673 (*)     eGFR if  6.5 (*)     eGFR if non  5.6 (*)     All other components within normal limits   B-TYPE NATRIURETIC PEPTIDE - Abnormal; Notable for the following components:    BNP 1,961 (*)     All other components within normal limits    Narrative:     ADD ON BNP PER BLAKE L SINDY, PA-C AT  09/18/2018  11:02 (CBC)  ADD ON TROP & TSH PER BLAKE L SINDY, PA-C AT  09/18/2018 11:12    TROPONIN I - Abnormal; Notable for the following components:    Troponin I 0.308 (*)     All other components within normal limits    Narrative:     ADD ON BNP PER BLAKE L SINDY, PA-C AT  09/18/2018  11:02 (CBC)  ADD ON TROP & TSH PER BLAKE L SINDY, PA-C AT  09/18/2018 11:12    TROPONIN I - Abnormal; Notable for the following components:    Troponin I 0.420 (*)     All other components within normal limits   ISTAT PROCEDURE - Abnormal; Notable for the following components:    POC BUN 40 (*)     POC Creatinine 9.8 (*)     POC Hematocrit 27 (*)     All other components within normal limits   ISTAT PROCEDURE - Abnormal; Notable for the following components:    POC BUN 42 (*)     POC Creatinine 10.6 (*)     POC Hematocrit 25 (*)     All other components within normal limits   CULTURE, BLOOD   CULTURE, BLOOD   MAGNESIUM   PHOSPHORUS   TSH   TROPONIN I   B-TYPE NATRIURETIC PEPTIDE   TSH    Narrative:     ADD ON BNP PER BLAKE L SINDY, PA-C AT  09/18/2018  11:02 (CBC)  ADD ON TROP & TSH PER BLAKE L SINDY, PA-C AT  09/18/2018 11:12    ISTAT CHEM8        ECG Results          EKG 12-lead (Final result)  Result time 09/18/18 13:30:30    Final result by Interface, Lab In  [facility] (09/18/18 13:30:30)                 Narrative:    Test Reason : R00.0  Blood Pressure : 158/086 mmHG  Vent. Rate : 127 BPM     Atrial Rate : 127 BPM     P-R Int : 114 ms          QRS Dur : 078 ms      QT Int : 314 ms       P-R-T Axes : 067 080 061 degrees     QTc Int : 456 ms    Sinus tachycardia  Nonspecific ST abnormality  Abnormal ECG  When compared with ECG of 18-SEP-2018 08:25,  No significant change was found  Confirmed by LOVELY WINTERS MD ([id number]) on 9/18/2018 1:30:25 PM    Referred By: KEN BELL           Confirmed By:LOVELY WINTERS MD                             EKG 12-lead (Final result)  Result time 09/18/18 13:28:30    Final result by [facility], Lab In [facility] (09/18/18 13:28:30)                 Narrative:    Test Reason : R06.02,  Blood Pressure : 230/149 mmHG  Vent. Rate : 116 BPM     Atrial Rate : 116 BPM     P-R Int : 126 ms          QRS Dur : 082 ms      QT Int : 370 ms       P-R-T Axes : 081 087 090 degrees     QTc Int : 514 ms    Sinus tachycardia  Possible Left atrial enlargement  Borderline Abnormal ECG  When compared with ECG of 31-AUG-2018 22:33,  No significant change was found  Confirmed by LOVELY WINTERS MD ([id number]) on 9/18/2018 1:28:25 PM    Referred By: KEN BELL           Confirmed By:LOVELY WINTERS MD                            Imaging Results          X-Ray Chest AP Portable (Final result)  Result time 09/18/18 09:27:23    Final result by Philip Conde MD (09/18/18 09:27:23)                 Impression:      See above      Electronically signed by: Philip Conde MD  Date:    09/18/2018  Time:    09:27             Narrative:    EXAMINATION:  XR CHEST AP PORTABLE    CLINICAL HISTORY:  Shortness of breath    TECHNIQUE:  Single frontal view of the chest was performed.    COMPARISON:  08/31/2018    FINDINGS:  Cardiac size remains mildly enlarged but the lungs are clear no vascular congestion or consolidation.                                 Medical Decision Making:    History:   Old Medical Records: I decided to obtain old medical records.  Differential Diagnosis:   My differential diagnosis includes but is not limited to:  Hypertensive emergency, hypertensive urgency, electrolyte disturbance, ACS  Clinical Tests:   Lab Tests: Ordered and Reviewed  Radiological Study: Ordered and Reviewed  Medical Tests: Ordered and Reviewed       APC / Resident Notes:   28-year-old female on dialysis presents to the ED from dialysis for elevated blood pressure and blurry vision.  BP on arrival was 230/150.  Patient also with tachycardic to 120s-130s. Patient mentating normally.  Appears in no acute distress. No peripheral edema on exam.    The work is remarkable for an elevated troponin at 0.3 from patient's baseline of 0.03.  BNP is elevated as well at 1961 from previous of 1300.  Chest x-ray reveals no acute abnormalities.  EKG reveals sinus tachycardia, no evidence of ischemia or other life-threatening arrhythmia.   Patient required nicardipine infusion in the ED with improvement in her BP, persistent tachycardia.  She received IV labetalol with some improvement in heart rate to 110s.  When attempting to wean nicardipine, patient's BP increased back to >200 SBP.  Critical Care Medicine was consulted and assessed the patient in the ED.  They do not feel that she requires ICU placement at this time.  Nephrology was also consulted.  They do not feel that patient's hypertensive emergency is directly linked needing dialysis. They will evaluate the patient and follow as inpatient.  Patient admitted to Hospital Medicine for further treatment and management of hypertensive emergency. I have reviewed the patient's records and discussed this case with my supervising physician.           Attending Attestation:     Physician Attestation Statement for NP/PA:   I have conducted a face to face encounter with this patient in addition to the NP/PA, due to Medical Complexity    Other NP/PA Attestation  Additions:    History of Present Illness: 28-year-old woman with comorbidities of ESRD/HD presents by EMS for evaluation of significant hypertension and tachycardia noted at dialysis today.                      Clinical Impression:   The primary encounter diagnosis was Hypertensive emergency. Diagnoses of Shortness of breath and Tachycardia were also pertinent to this visit.      Disposition:   Disposition: Admitted  Condition: Serious                        Emily Gavin PA-C  09/18/18 [id number]

## 2018-09-18 NOTE — ED NOTES
Pt taken to room with monitor and nurse x 2. Rosalba RN was at bedside when patient dropped off. All questions answered at this time.

## 2018-09-18 NOTE — ASSESSMENT & PLAN NOTE
- Will resume home regimen of 0.3mg clonidine patch daily, labetalol 500mg bid, hydralazine 100mg q12, irbesartan 300mg daily, and nifedipine 120mg daily.

## 2018-09-18 NOTE — ASSESSMENT & PLAN NOTE
- Will resume home regimen of 0.3mg clonidine patch daily, labetalol 500mg bid, hydralazine 100mg q12, irbesartan 300mg daily, and nifedipine 120mg daily.  - started on cardene drip in ED along with labetalol injection with improvement in BP, but SBP back >200 when weaning the cardene. Off cardene drip for now.  - Given extra dose of 0.3mg clonidine in ED.   - MAP goal of 115-120.  - F/u CT head  - Nephrology recs no emergent dialysis needed at this time. Plan for HD on 9/19.

## 2018-09-18 NOTE — ED NOTES
The patient is awake, alert and acting age appropriately. Family at bedside.    No apparent distress noted. Airway is open and patent.  Respirations with normal effort and rate noted. Sinus tachycardia noted on the monitor. Explanation of care provided to family and patient. No needs at this time. Will continue to monitor.

## 2018-09-18 NOTE — ED NOTES
The patient is awake, alert and acting age appropriately.    No apparent distress noted. Airway is open and patent.  Respirations with normal effort and rate noted. Sinus tachycardia noted on the monitor. Explanation of care provided to  patient. No needs at this time. Will continue to monitor.

## 2018-09-18 NOTE — ED NOTES
Pt in bed with rails up X 2. States she has some blurry vision. States she went to dialysis today but her BP was too high and they sent her here. She did not receive dialysis today. Pt has no other complaints. BP trending down and will continue to monitor.

## 2018-09-18 NOTE — ASSESSMENT & PLAN NOTE
- hx liver transplant at 1 year of age. Hemangioendothelioma s/p LTx (1992).  - on Prograf and Prednisone currently  - will obtain prograf level  - hx of MSSA bacteremia in which pt recently completed 2 week course of Vancomycin. Will obtain blood cultures due to immunosuppressed status.

## 2018-09-18 NOTE — SUBJECTIVE & OBJECTIVE
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 ()    Moderate protein-calorie malnutrition 2017    MRSA bacteremia 2017    Pneumonia     Prophylactic immunotherapy 2014    Renovascular hypertension 10/2/2015    Secondary hyperparathyroidism 2017    Seizures     Sialadenitis 3/21/2018    Thrombocytopenia 2016       Past Surgical History:   Procedure Laterality Date    BIOPSY-LIVER N/A 2017    Performed by [facility] at [facility] OR 2ND FLR    BIOPSY-LIVER N/A 3/22/2016    [facility]     SECTION      x 2    CONIZATION OF CERVIX USING LOOP ELECTROSURGICAL EXCISION PROCEDURE (LEEP) N/A 6/15/2018    Procedure: CONIZATION-CERVICAL-LEEP;  Surgeon: Neelam Marroquin MD;  Location: [facility] OR;  Service: OB/GYN;  Laterality: N/A;    CONIZATION-CERVICAL-LEEP N/A 6/15/2018    Performed by Neelam Marroquin MD at [facility] OR    [id number]; upper extremity Left 2015    Performed by Idalia Diaz MD at [facility] OR 2ND FLR    EMBOLIZATION N/A 2018    Procedure: EMBOLIZATION, BLOOD VESSEL;  Surgeon: Aren Ramos MD;  Location: [facility] CATH LAB;  Service: Radiology;  Laterality: N/A;    EMBOLIZATION, BLOOD VESSEL N/A 2018    Performed by Aren Ramos MD at [facility] CATH LAB    Exam Under Anesthesia N/A 2018     Performed by [facility]am Marroquin MD at [facility] OR 2ND FLR    Exam under anesthesia N/A 6/19/2018    Performed by Neelam Marroquin MD at [facility] OR    Exam under anesthesia (ADD ON ) N/A 7/9/2018    Performed by NICKIE Alvarez MD at [facility] OR    Exam under anesthesia -cervical suturing  N/A 7/26/2018    Performed by Lei Sims III, MD at [facility] OR    EXAMINATION UNDER ANESTHESIA N/A 6/19/2018    Procedure: Exam under anesthesia;  Surgeon: Neelam Marroquin MD;  Location: [facility] OR;  Service: OB/GYN;  Laterality: N/A;    EXAMINATION UNDER ANESTHESIA N/A 7/9/2018    Procedure: Exam under anesthesia (ADD ON );  Surgeon: NICKIE Alvarez MD;  Location: [facility];  Service: OB/GYN;  Laterality: N/A;  (ADD ON )    EXAMINATION UNDER ANESTHESIA N/A 7/26/2018    Procedure: Exam under anesthesia -cervical suturing ;  Surgeon: Lei Sims III, MD;  Location: [facility] OR;  Service: OB/GYN;  Laterality: N/A;    EXAMINATION UNDER ANESTHESIA N/A 8/8/2018    Procedure: Exam Under Anesthesia;  Surgeon: Neelam Marroquin MD;  Location: [facility] OR 2ND FLR;  Service: OB/GYN;  Laterality: N/A;  need endoloop  request 12 noon    FISTULOGRAM Left 12/4/2015    Performed by Idalia Diaz MD at [facility] CATH LAB    LIVER BIOPSY      LIVER TRANSPLANT  09/1992    PERINEORRHAPHY  6/19/2018    Procedure: SUTURE REPAIR,CERVIX;  Surgeon: Neelam Marroquin MD;  Location: [facility];  Service: OB/GYN;;    SUTURE REPAIR,CERVIX  6/19/2018    Performed by Neelam Marroquin MD at [facility] OR    TUBAL LIGATION  2010       Review of patient's allergies indicates:   Allergen Reactions    Chloral hydrate      Other reaction(s): Hallucinations  Other reaction(s): Hives    Hydrocodone Other (See Comments)     Mental status changes       Family History     Problem Relation (Age of Onset)    Hypertension Mother, Father        Tobacco Use    Smoking status: Never Smoker    Smokeless tobacco: Never Used   Substance and Sexual Activity     Alcohol use: No    Drug use: No    Sexual activity: No     Partners: Male      Review of Systems   Constitutional: Negative for chills and fever.   HENT: Negative for sore throat.    Eyes: Positive for visual disturbance.   Respiratory: Negative for cough and shortness of breath.    Cardiovascular: Negative for chest pain.   Gastrointestinal: Positive for abdominal pain. Negative for diarrhea, nausea and vomiting.   Genitourinary: Negative for dysuria.   Musculoskeletal: Negative for back pain.   Skin: Negative for rash and wound.   Neurological: Negative for dizziness, syncope, light-headedness and headaches.     Objective:     Vital Signs (Most Recent):  Temp: 97.9 °F (36.6 °C) (09/18/18 1828)  Pulse: 99 (09/18/18 1834)  Resp: (!) 28 (09/18/18 1834)  BP: (!) 192/125 (09/18/18 1834)  SpO2: 100 % (09/18/18 1834) Vital Signs (24h Range):  Temp:  [97.9 °F (36.6 °C)-98.6 °F (37 °C)] 97.9 °F (36.6 °C)  Pulse:  [] 99  Resp:  [5-62] 28  SpO2:  [96 %-100 %] 100 %  BP: (149-230)/() 192/125   Weight: 56.2 kg (123 lb 14.4 oz)  Body mass index is 20.62 kg/m².    No intake or output data in the 24 hours ending 09/18/18 1856    Physical Exam   Constitutional: She is oriented to person, place, and time. She appears well-developed and well-nourished. No distress.   HENT:   Head: Normocephalic and atraumatic.   Mouth/Throat: Oropharynx is clear and moist.   Edematous face   Neck: Neck supple.   Cardiovascular: Normal rate and regular rhythm.   AV fistula to LUE   Pulmonary/Chest: Effort normal and breath sounds normal. No stridor. No respiratory distress. She has no wheezes.   Abdominal: Soft. Bowel sounds are normal. She exhibits distension. There is no tenderness. There is no guarding.   Musculoskeletal: She exhibits no edema or deformity.   Neurological: She is alert and oriented to person, place, and time. She has normal strength. No cranial nerve deficit or sensory deficit.   Skin: Skin is warm and dry. She is  not diaphoretic. There is erythema. There is pallor.   Psychiatric: She has a normal mood and affect. Her behavior is normal.   Nursing note and vitals reviewed.      Vents:     Lines/Drains/Airways     Drain                 Hemodialysis AV Fistula Left forearm -- days          Peripheral Intravenous Line                 Peripheral IV - Single Lumen 09/18/18 0810 Right Antecubital less than 1 day              Significant Labs:    CBC/Anemia Profile:  Recent Labs   Lab  09/18/18   0814  09/18/18   0819  09/18/18   1458   WBC   --   4.82   --    HGB   --   9.0*   --    HCT  27*  29.1*  25*   PLT   --   58*   --    MCV   --   89   --    RDW   --   18.6*   --         Chemistries:  Recent Labs   Lab  09/18/18   0819   NA  140   K  3.9   CL  103   CO2  25   BUN  45*   CREATININE  8.7*   CALCIUM  9.5   ALBUMIN  2.9*   PROT  8.2   BILITOT  1.2*   ALKPHOS  673*   ALT  32   AST  32   MG  2.4   PHOS  4.5     Troponin:   Recent Labs   Lab  09/18/18   0819  09/18/18   1456   TROPONINI  0.308*  0.420*       Significant Imaging:   Procedure Component Value Units Date/Time   CT Head Without Contrast [[id number]] Resulted: 09/18/18 1813   Order Status: Completed Updated: 09/18/18 1815   Narrative:     EXAMINATION:  CT HEAD WITHOUT CONTRAST    CLINICAL HISTORY:  Dizziness;Hypertension;    TECHNIQUE:  Low dose axial images were obtained through the head.  Coronal and sagittal reformations were also performed. Contrast was not administered.    COMPARISON:  05/01/2018    FINDINGS:  The brain is normally formed and exhibits normal density throughout.  There is no evidence of acute major vascular territory infarct, hemorrhage, or mass.  There is no hydrocephalus.  There are no abnormal extra-axial fluid collections.  The paranasal sinuses and mastoid air cells are clear, and there is no evidence of calvarial fracture.  The visualized soft tissues are unremarkable.   Impression:       1. No acute intracranial  abnormalities.      Electronically signed by: Raheem Camacho MD  Date: 09/18/2018  Time: 18:13

## 2018-09-19 PROBLEM — H35.033 HYPERTENSIVE RETINOPATHY OF BOTH EYES, GRADE 3: Status: ACTIVE | Noted: 2018-01-01

## 2018-09-19 PROBLEM — H53.8 BLURRY VISION, BILATERAL: Status: ACTIVE | Noted: 2018-01-01

## 2018-09-19 NOTE — ASSESSMENT & PLAN NOTE
-Patient with evidence of hypertensive changes including attenuated vessels, hard exudates, flame hemorrhages and DBH.  -No evidence of optic nerve edema.  -Likely macular edema seen OD; appears like there was possible macular edema OS that has now resolved with remaining hard exudates. Edema likely contributing to continued blurry vision; however, patient is 20/20-1 BOTH EYES (Near)   -Encourage BP control.   -Recommend A1C testing. Some of changes seen on exam can be 2/2 diabetic retinopathy. No A1c on file.   -No emergent ophthalmologic interventions at this time.   -Will schedule follow up in 1 week with OCT imaging.   -Patient given Clinic phone number [phone] to follow up with scheduling since patient is still admitted.

## 2018-09-19 NOTE — CARE UPDATE
ICU Transfer of Care note    Date of Admit: 9/18/2018  Date of Transfer / Stepdown: 9/19/2018    Brief History of Present Illness:      Holly Patel is a 28 y.o. female who  has a past medical history of Anemia in ESRD (end-stage renal disease) (10/12/2015), Chronic rejection of liver transplant (3/22/2016), Depression, Encounter for blood transfusion, ESRD on hemodialysis (9/30/2015), History of recent hospitalization (05/2018), History of splenomegaly (4/12/2016), Immunosuppressed (8/5/2017), Iron deficiency anemia secondary to inadequate dietary iron intake (8/16/2017), Liver replaced by transplant (9/10/2012), Moderate protein-calorie malnutrition (8/16/2017), MRSA bacteremia (8/6/2017), Pneumonia, Prophylactic immunotherapy (8/4/2014), Renovascular hypertension (10/2/2015), Secondary hyperparathyroidism (8/5/2017), Seizures, Sialadenitis (3/21/2018), and Thrombocytopenia (4/12/2016).. The patient presented to [facility] on 9/18/2018 with a primary complaint of Hypertension (Pt was at dialysis and had blurred vision accompanied by hypertension. Pt states she took her anti-hypertensive meds prior to dialysis. Pt has no other complaints. )    28 year old female with hx of ESRD (HD on Tues, Thurs, Sat), poorly controlled HTN, liver transplant at 1 year of age (currently on prograf and prednisone), HFpEF presents with onset of blurry vision when she woke up this morning. Pt went to dialysis today and was sent to ED due to elevated BP, SBP in 220s. Baseline -180. Pt is compliant with her home BP meds 0.3mg clonidine patch daily, labetalol 500mg bid, hydralazine 100mg q12, irbesartan 300mg daily, and nifedipine 120mg daily. She took off her clonidine patch yesterday due to itching and forgot to put it back on. Pt states her L eye is more blurry. She has an AV fistula to her LUE. Only other complaint is mild diffuse abdominal burning. Denies chest pain, shortness of breath, nausea, vomiting,  dizziness. Pt had a recent admission for MSSA bacteremia with temp of 104.3, treated with 2 weeks of vancomycin. Of note, she had a LEEP in June 2018 that was complicated by persistent vaginal bleeding. Pt notes that the bleeding resolved after discharge and denies pelvic pain.       In ED, BP on arrival was 230/150, tachycardic to 120s-130s. Troponin elevated at 0.3 from patient's baseline of 0.03. BNP elevated as well at 1961 from previous of 1300. CXR negative for acute abnormality. EKG reveals sinus tachycardia. Pt required cardene infusion in the ED with improvement in her BP, persistent tachycardia.  She received IV labetalol with some improvement in heart rate to 110s.  When attempting to wean cardene, pt's BP increased back to >200 SBP.   Hospital Course   9/18: Admitted to MICU. BP remained elevated, so restarted cardene drip overnight with MAP goal 120-130. Restarted home BP meds, which are 0.3mg clonidine patch daily, labetalol 500mg bid, hydralazine 100mg q12, irbesartan 300mg daily, and nifedipine 120mg daily.      9/19: BP improved. -125. Weaned off cardene drip. Continue home BP regimen. HD today. Wean O2. [facility] hepatology recs to continue Prograf, but hold the prednisone and cellcept, and have pt follow up in liver transplant clinic on discharge.     Consultants and Procedures:     Consultants:  Nephrology  Opthalmology    Procedures:    None    Transfer Information:     Diet:  Cardiac diet    Physical Activity:  N/A    To Do / Pending Studies / Follow ups:  - Continue home BP meds.  - Monitor BP.  - Follow up nephrology recs. HD today.  - Follow up blood cultures, immunosuppressed, recent hx of MSSA bacteremia. No suspicion of infection at this time.     Contingency Plan   - Return to ICU if needs cardene drip to control BP      Clara Lea, MANFRED, AG-ACNP, BC  Department of Hospital Medicine  [facility]

## 2018-09-19 NOTE — CONSULTS
[facility]  Nephrology  Consult Note    Patient Name: Holly Patel  MRN: [id number]  Admission Date: 9/18/2018  Hospital Length of Stay: 0 days  Attending Provider: Day Salcedo MD   Primary Care Physician: Stan Sosa MD  Principal Problem:Hypertensive emergency    Consults  Subjective:     HPI:     Ms. Patel is a 27 yo AAF with ESRD and hypertension who presented to ER with hypertensive emergency. She receives dialysis on Tuesday, Thursday and Saturday. She received her last full dialysis treatment on 9/15/18. She was due for her next HD treatment today but she was noted to be severely hypertensive and she also had blurred vision due to which she was sent to ER. Her BP was 230/149 when she presented to ER. Pt does report she had some shortness of breath when she presented. Her CXR showed cardiomegaly but did not show any fluid overload or pulmonary edema. She had troponin rise as noted in the ER. She was tachycardic when she presented to ER.    She was started on Cardene drip. She also received IV labetalol. She also received CT head earlier today which did not show any acute findings.    Her other comorbid conditions, past medical history and previous multiple admissions with similar complaints were reviewed.     She dialyzes at [facility] under the care of Dr. Bass.  She dialyzes for 3.5 hours and reports an EDW of 50.5 kg.  She has an AVF to her LFA. She does not make any urine.    She was evaluated in the ICU. Her antihypertensive regimen was being adjusted by ICU team. Pt denied any ongoing SOB. She did admit problems with BP control.      Past Medical History:   Diagnosis Date    Anemia in ESRD (end-stage renal disease) 10/12/2015    dialysis tues, thursday, sat; access left arm    Chronic rejection of liver transplant 3/22/2016    Depression     Encounter for blood transfusion     ESRD on hemodialysis 9/30/2015    History of recent hospitalization 05/2018     pneumonia    History of splenomegaly 2016    Immunosuppressed 2017    Iron deficiency anemia secondary to inadequate dietary iron intake 2017    She [facility] iron [facility].   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 ()    Moderate protein-calorie malnutrition 2017    MRSA bacteremia 2017    Pneumonia     Prophylactic immunotherapy 2014    Renovascular hypertension 10/2/2015    Secondary hyperparathyroidism 2017    Seizures     Sialadenitis 3/21/2018    Thrombocytopenia 2016       Past Surgical History:   Procedure Laterality Date    BIOPSY-LIVER N/A 2017    Performed by [facility] Diagnostic Provider at [facility] OR 2ND FLR    BIOPSY-LIVER N/A 3/22/2016    Performed by [facility] Diagnostic Provider at [facility] OR 2ND FLR     SECTION      x 2    CONIZATION OF CERVIX USING LOOP ELECTROSURGICAL EXCISION PROCEDURE (LEEP) N/A 6/15/2018    Procedure: CONIZATION-CERVICAL-LEEP;  Surgeon: Neelam Marroquin MD;  Location: [facility] OR;  Service: OB/GYN;  Laterality: N/A;    CONIZATION-CERVICAL-LEEP N/A 6/15/2018    Performed by Neelam Marroquin MD at [facility] OR    [id number]; upper extremity Left 2015    Performed by Idalia Diaz MD at [facility] OR 2ND FLR    EMBOLIZATION N/A 2018    Procedure: EMBOLIZATION, BLOOD VESSEL;  Surgeon: Aren Ramos MD;  Location: [facility] CATH LAB;  Service: Radiology;  Laterality: N/A;    EMBOLIZATION, BLOOD VESSEL N/A 2018    Performed by Aren Ramos MD at [facility] CATH LAB    Exam Under Anesthesia N/A 2018    Performed by Neelam Marroquin MD at [facility] OR 2ND FLR    Exam under anesthesia N/A 2018    Performed by Neelam Marroquin MD at [facility] OR    Exam under anesthesia (ADD ON ) N/A 2018    Performed by NICKIE Alvarez MD at [facility] OR    Exam under anesthesia -cervical suturing  N/A 2018    Performed by Lei Sims  III, MD at [facility] OR    EXAMINATION UNDER ANESTHESIA N/A 6/19/2018    Procedure: Exam under anesthesia;  Surgeon: Neelam Marroquin MD;  Location: [facility] OR;  Service: OB/GYN;  Laterality: N/A;    EXAMINATION UNDER ANESTHESIA N/A 7/9/2018    Procedure: Exam under anesthesia (ADD ON );  Surgeon: NICKIE Alvarez MD;  Location: [facility] OR;  Service: OB/GYN;  Laterality: N/A;  (ADD ON )    EXAMINATION UNDER ANESTHESIA N/A 7/26/2018    Procedure: Exam under anesthesia -cervical suturing ;  Surgeon: Lei Sims III, MD;  Location: [facility] OR;  Service: OB/GYN;  Laterality: N/A;    EXAMINATION UNDER ANESTHESIA N/A 8/8/2018    Procedure: Exam Under Anesthesia;  Surgeon: Neelam Marroquin MD;  Location: [facility] 2ND FLR;  Service: OB/GYN;  Laterality: N/A;  need endoloop  request 12 noon    FISTULOGRAM Left 12/4/2015    Performed by Idalia Diaz MD at [facility] CATH LAB    LIVER BIOPSY      LIVER TRANSPLANT  09/1992    PERINEORRHAPHY  6/19/2018    Procedure: SUTURE REPAIR,CERVIX;  Surgeon: Neelam Marroquin MD;  Location: [facility];  Service: OB/GYN;;    SUTURE REPAIR,CERVIX  6/19/2018    Performed by Neelam Marroquin MD at [facility] OR    TUBAL LIGATION  2010       Review of patient's allergies indicates:   Allergen Reactions    Chloral hydrate      Other reaction(s): Hallucinations  Other reaction(s): Hives    Hydrocodone Other (See Comments)     Mental status changes     Current Facility-Administered Medications   Medication Frequency    acetaminophen tablet 650 mg Q4H PRN    [START ON 9/19/2018] aspirin chewable tablet 81 mg Daily    cloNIDine 0.3 mg/24 hr td ptwk 1 patch Q7 Days    [START ON 9/19/2018] famotidine tablet 20 mg Daily    hydrALAZINE tablet 100 mg Q12H    [START ON 9/19/2018] irbesartan tablet 300 mg Daily    labetalol tablet 500 mg Q12H    niCARdipine 40 mg/200 mL infusion Continuous    [START ON 9/19/2018] NIFEdipine 24 hr tablet 120 mg Daily    [START ON 9/19/2018] predniSONE  tablet 1 mg Every other day    promethazine tablet 25 mg Q6H PRN    sodium chloride 0.9% flush 3 mL PRN    tacrolimus capsule 6 mg BID    zolpidem tablet 5 mg Nightly PRN     Family History     Problem Relation (Age of Onset)    Hypertension Mother, Father        Tobacco Use    Smoking status: Never Smoker    Smokeless tobacco: Never Used   Substance and Sexual Activity    Alcohol use: No    Drug use: No    Sexual activity: No     Partners: Male     Review of Systems   Constitutional: Negative for appetite change, chills, fatigue and fever.   HENT: Negative for congestion, hearing loss and sore throat.    Eyes: Positive for visual disturbance. Negative for discharge.   Respiratory: Positive for shortness of breath. Negative for cough and chest tightness.    Cardiovascular: Negative for chest pain and palpitations.   Gastrointestinal: Negative for abdominal pain, diarrhea, nausea and vomiting.   Musculoskeletal: Negative for arthralgias and back pain.   Skin: Negative for pallor and rash.   Neurological: Negative for dizziness, numbness and headaches.   Hematological: Does not bruise/bleed easily.   Psychiatric/Behavioral: Negative for confusion. The patient is not nervous/anxious.      Objective:     Vital Signs (Most Recent):  Temp: 98.2 °F (36.8 °C) (09/18/18 1900)  Pulse: 97 (09/18/18 2029)  Resp: (!) 46 (09/18/18 2029)  BP: (!) 203/129 (09/18/18 2000)  SpO2: 99 % (09/18/18 2029)  O2 Device (Oxygen Therapy): room air (09/18/18 2029) Vital Signs (24h Range):  Temp:  [97.9 °F (36.6 °C)-98.6 °F (37 °C)] 98.2 °F (36.8 °C)  Pulse:  [] 97  Resp:  [5-62] 46  SpO2:  [96 %-100 %] 99 %  BP: (149-230)/() 203/129     Weight: 56.2 kg (123 lb 14.4 oz) (09/18/18 1828)  Body mass index is 20.62 kg/m².  Body surface area is 1.61 meters squared.    No intake/output data recorded.    Physical Exam   Constitutional: She is oriented to person, place, and time. She appears well-developed and well-nourished. No  distress.   HENT:   Head: Normocephalic and atraumatic.   Mouth/Throat: Oropharynx is clear and moist.   Eyes: Right eye exhibits no discharge. Left eye exhibits no discharge.   Neck: Neck supple. No JVD present.   Cardiovascular: Normal rate, regular rhythm and normal heart sounds. Exam reveals no friction rub.   No murmur heard.  Pulmonary/Chest: Effort normal and breath sounds normal. No respiratory distress. She has no wheezes. She has no rales.   Abdominal: Soft. She exhibits no distension.   Musculoskeletal: She exhibits no edema.   Access left forearm with good thrill    Neurological: She is alert and oriented to person, place, and time.   Speech normal, cognition normal, moving all 4 limbs   Skin: Skin is warm and dry. No rash noted.   Psychiatric: She has a normal mood and affect. Her behavior is normal.   Nursing note and vitals reviewed.      Significant Labs:  Cardiac Markers: No results for input(s): CKMB, TROPONINT, MYOGLOBIN in the last 168 hours.  CBC:   Recent Labs   Lab  09/18/18 0819 09/18/18   1458   WBC  4.82   --    RBC  3.27*   --    HGB  9.0*   --    HCT  29.1*  25*   PLT  58*   --    MCV  89   --    MCH  27.5   --    MCHC  30.9*   --      CMP:   Recent Labs   Lab  09/18/18 0819   GLU  96   CALCIUM  9.5   ALBUMIN  2.9*   PROT  8.2   NA  140   K  3.9   CO2  25   CL  103   BUN  45*   CREATININE  8.7*   ALKPHOS  673*   ALT  32   AST  32   BILITOT  1.2*     Coagulation: No results for input(s): PT, INR, APTT in the last 168 hours.  LFTs:   Recent Labs   Lab  09/18/18 0819   ALT  32   AST  32   ALKPHOS  673*   BILITOT  1.2*   PROT  8.2   ALBUMIN  2.9*     PTH: No results for input(s): PTH in the last 168 hours.  No results for input(s): COLORU, CLARITYU, SPECGRAV, PHUR, PROTEINUA, GLUCOSEU, BILIRUBINCON, BLOODU, WBCU, RBCU, BACTERIA, MUCUS, NITRITE, LEUKOCYTESUR, UROBILINOGEN, HYALINECASTS in the last 168 hours.  All labs within the past 24 hours have been reviewed.    Significant  Imaging:  ECG: Reviewed  X-Ray: Reviewed  CT: Reviewed    Assessment/Plan:     Active Diagnoses:    Diagnosis Date Noted POA    PRINCIPAL PROBLEM:  Hypertensive emergency [I16.1] 06/22/2018 Yes    Shortness of breath [R06.02] 09/18/2018 Yes    Immunosuppressed [D89.9] 08/05/2017 Yes     Chronic    Renovascular hypertension [I15.0] 10/02/2015 Yes     Chronic    ESRD on hemodialysis [N18.6, Z99.2] 09/30/2015 Not Applicable     Chronic   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 [Z94.4] 09/10/2012 Not Applicable     Chronic      Problems Resolved During this Admission:       ESRD  Hypertensive emergency  Anemia of ESRD  Chronic immunosuppression  Left forearm AVF    - pt presenting with hypertensive emergency. Her home antihypertensive regimen has been resumed. She required Cardene drip, IV labetalol. ICU team is trying to leave her off Cardene drip. She could not provide any information on her recent salt intake, dietary changes and compliance with salt and fluid restriction/ compliance with medications. But this needs to be further explored.  - she will be dialyzed tomorrow but overnight if she develops any signs of fluid overload then will dialyze through the night shift. Partly her uncontrolled BP could be volume dependant but of course has possibly additional contributors. CXR, exam do not show hypervolemia.  - will obtain her outpatient dialysis records. With her uncontrolled hypertension, in the very near future would avoid any Epogen or similar medicines as MARIA T are known to cause uncontrolled hypertension  - finally will determine if she needed changes to her dry weight  - nutritionist consult to review her diet  - daily renal panel to monitor electrolytes, acid base status  - resume home dose of Sensipar. She has not been taking any phos binders.   - would maintain high threshold for Epogen or similar MARIA T dosing for her given her uncontrolled hypertension. Will need to confirm from her dialysis clinic about her last  dose of Erythropoietin stimulating agent, especially if she received a long acting preparation and dose for her. She would benefit by use of lowest possible dose to keep her Hb at around 10 as typically such medicines cause uncontrolled hypertension.      Thank you for your consult. I will follow-up with patient. Please contact us if you have any additional questions.    Drew Portillo MD  Nephrology  [facility]

## 2018-09-19 NOTE — SUBJECTIVE & OBJECTIVE
Interval History/Significant Events: NAEON. Pt reports improvement in blurry vision. MAP down to 116, will wean off cardene drip.     Review of Systems   Constitutional: Negative for chills and fever.   HENT: Negative for sore throat.    Eyes: Positive for visual disturbance.   Respiratory: Positive for shortness of breath. Negative for cough.    Cardiovascular: Negative for chest pain.   Gastrointestinal: Negative for abdominal pain, diarrhea, nausea and vomiting.   Genitourinary: Negative for dysuria.   Musculoskeletal: Negative for back pain.   Skin: Negative for rash and wound.   Neurological: Negative for dizziness, syncope, light-headedness and headaches.     Objective:     Vital Signs (Most Recent):  Temp: 98 °F (36.7 °C) (09/19/18 1100)  Pulse: 82 (09/19/18 1215)  Resp: 19 (09/19/18 1215)  BP: (!) 142/90 (09/19/18 1215)  SpO2: 100 % (09/19/18 1215) Vital Signs (24h Range):  Temp:  [97.9 °F (36.6 °C)-98.2 °F (36.8 °C)] 98 °F (36.7 °C)  Pulse:  [] 82  Resp:  [0-82] 19  SpO2:  [89 %-100 %] 100 %  BP: (136-220)/() 142/90   Weight: 56.2 kg (123 lb 14.4 oz)  Body mass index is 20.62 kg/m².      Intake/Output Summary (Last 24 hours) at 9/19/2018 1220  Last data filed at 9/19/2018 0500  Gross per 24 hour   Intake 530 ml   Output --   Net 530 ml       Physical Exam   Constitutional: She is oriented to person, place, and time. She appears well-developed and well-nourished. No distress.   HENT:   Head: Normocephalic and atraumatic.   Mouth/Throat: Oropharynx is clear and moist.   Edematous face   Neck: Neck supple.   Cardiovascular: Normal rate and regular rhythm.   AV fistula to LUE   Pulmonary/Chest: Effort normal and breath sounds normal. No stridor. No respiratory distress. She has no wheezes.   Abdominal: Soft. Bowel sounds are normal. She exhibits distension. There is no tenderness. There is no guarding.   Musculoskeletal: She exhibits no edema or deformity.   Neurological: She is alert and oriented  to person, place, and time. She has normal strength. No cranial nerve deficit or sensory deficit.   Skin: Skin is warm and dry. She is not diaphoretic. No erythema. No pallor.   Psychiatric: She has a normal mood and affect. Her behavior is normal.   Nursing note and vitals reviewed.    Vents:     Lines/Drains/Airways     Drain                 Hemodialysis AV Fistula Left forearm -- days          Peripheral Intravenous Line                 Peripheral IV - Single Lumen 09/18/18 0810 Right Antecubital 1 day         Peripheral IV - Single Lumen 09/18/18 2028 Anterior;Right Forearm less than 1 day              Significant Labs:    CBC/Anemia Profile:  Recent Labs   Lab  09/18/18   0819 09/18/18   1458  09/19/18   0329   WBC  4.82   --   3.59*   HGB  9.0*   --   7.2*   HCT  29.1*  25*  22.6*   PLT  58*   --   51*   MCV  89   --   88   RDW  18.6*   --   18.4*        Chemistries:  Recent Labs   Lab  09/18/18   0819 09/19/18 0329   NA  140  139   K  3.9  4.3   CL  103  105   CO2  25  21*   BUN  45*  53*   CREATININE  8.7*  9.9*   CALCIUM  9.5  8.6*   ALBUMIN  2.9*  2.4*   PROT  8.2  6.7   BILITOT  1.2*  0.7   ALKPHOS  673*  586*   ALT  32  30   AST  32  45*   MG  2.4  2.4   PHOS  4.5  5.3*     Blood Culture:   Recent Labs   Lab  09/18/18   1750  09/18/18 1756   LABBLOO  No Growth to date  No Growth to date     Troponin:   Recent Labs   Lab  09/18/18   0819 09/18/18   1456  09/18/18 2004   TROPONINI  0.308*  0.420*  0.540*       Significant Imaging:  I have reviewed all pertinent imaging results/findings within the past 24 hours.

## 2018-09-19 NOTE — RESIDENT HANDOFF
Handoff     Primary Team: Networked reference to record PCT  Room Number: 52859 CVICU/44797 CVICU A     Patient Name: Holly Patel MRN: [id number]     Date of Birth: [id number] Allergies: Chloral hydrate and Hydrocodone     Age: 28 y.o. Admit Date: 9/18/2018     Sex: female  BMI: Body mass index is 20.62 kg/m².     Code Status: Full Code        Illness Level (current clinical status): Watcher - No    Reason for Admission: Hypertensive emergency    Brief HPI (pertinent PMH and diagnosis or differential diagnosis):   28 year old female with hx of ESRD (HD on Tues, Thurs, Sat), poorly controlled HTN, liver transplant at 1 year of age (currently on prograf and prednisone), HFpEF presents with onset of blurry vision when she woke up this morning. Pt went to dialysis today and was sent to ED due to elevated BP, SBP in 220s. Baseline -180. Pt is compliant with her home BP meds 0.3mg clonidine patch daily, labetalol 500mg bid, hydralazine 100mg q12, irbesartan 300mg daily, and nifedipine 120mg daily. She took off her clonidine patch yesterday due to itching and forgot to put it back on. Pt states her L eye is more blurry. She has an AV fistula to her LUE. Only other complaint is mild diffuse abdominal burning. Denies chest pain, shortness of breath, nausea, vomiting, dizziness. Pt had a recent admission for MSSA bacteremia with temp of 104.3, treated with 2 weeks of vancomycin. Of note, she had a LEEP in June 2018 that was complicated by persistent vaginal bleeding. Pt notes that the bleeding resolved after discharge and denies pelvic pain.       In ED, BP on arrival was 230/150, tachycardic to 120s-130s. Troponin elevated at 0.3 from patient's baseline of 0.03. BNP elevated as well at 1961 from previous of 1300. CXR negative for acute abnormality. EKG reveals sinus tachycardia. Pt required cardene infusion in the ED with improvement in her BP, persistent tachycardia.  She received IV labetalol with some  improvement in heart rate to 110s.  When attempting to wean cardene, pt's BP increased back to >200 SBP.     Procedure Date:     Hospital Course (updated, brief assessment by system or problem, significant events):   9/18: Admitted to MICU. BP remained elevated, so restarted cardene drip overnight with MAP goal 120-130. Restarted home BP meds, which are 0.3mg clonidine patch daily, labetalol 500mg bid, hydralazine 100mg q12, irbesartan 300mg daily, and nifedipine 120mg daily.     9/19: BP improved. -125. Weaned off cardene drip. Continue home BP regimen. HD today. Wean O2. [facility] hepatology recs to continue Prograf, but hold the prednisone and cellcept, and have pt follow up in liver transplant clinic on discharge.     Tasks (specific, using if-then statements):   - Continue home BP meds.  - Monitor BP.  - Follow up nephrology recs. HD today.  - Follow up blood cultures, immunosuppressed, recent hx of MSSA bacteremia. No suspicion of infection at this time.    Contingency Plan (special circumstances anticipated and plan):  - Return to ICU if needs cardene drip to control BP    Estimated Discharge Date:     Discharge Disposition: Admitted as an Inpatient    Mentored By: Day Salcedo MD

## 2018-09-19 NOTE — HOSPITAL COURSE
9/18: Admitted to MICU. BP remained elevated, so restarted cardene drip overnight with MAP goal 120-130. Restarted home BP meds, which are 0.3mg clonidine patch daily, labetalol 500mg bid, hydralazine 100mg q12, irbesartan 300mg daily, and nifedipine 120mg daily.   9/19: BP improved. -125. Will wean off cardene drip. Continue home BP regimen. Had HD, removed 3L in 3 hours. Wean O2. Ophthalmology evaluated, evidence of hypertensive changes- flame hemorrhages and hard exudates, but no intervention needed at this time. Hepatology curbside recs to hold prednisone and cellcept, but continue prograf. Instructed pt to follow up with liver transplant clinic to optimize dosing of immunosuppressant medications.   9/20: Was scheduled to stepdown to floor yesterday, but no beds available. BP well-controlled on home BP regimen. Will get HD today and discharge home after HD.

## 2018-09-19 NOTE — CONSULTS
[facility]  Ophthalmology  Consult Note    Patient Name: Holly Patel  MRN: [id number]  Admission Date: 9/18/2018  Hospital Length of Stay: 1 days  Attending Provider: Day Salcedo MD   Primary Care Physician: Stan Sosa MD  Principal Problem:Hypertensive emergency    Inpatient consult to Ophthalmology  Consult performed by: Jim Sylvester MD  Consult ordered by: Kennedy Loco MD        Subjective:     Chief Complaint:  Blurry vision     HPI:   Holly Patel is a 28 y.o. female with     PMH of ESRD (HD on Tues, Thurs, Sat), poorly controlled HTN, liver transplant at 1 year of age (currently on prograf and prednisone), HFpEF   Past Ocular History of NONE  Past Ocular Surgery of NONE  Family Ocular History of GLAUCOMA, CATARACTS    presenting with onset of blurry vision when she woke up yesterday morning (OD=OS). Pt went to dialysis yesterday and was sent to ED due to elevated BP, SBP in 220s. Baseline -180. Pt is compliant with her home BP meds 0.3mg clonidine patch daily, labetalol 500mg bid, hydralazine 100mg q12, irbesartan 300mg daily, and nifedipine 120mg daily. Patient had taken off clonidine patch the day before and forgot to put it back on. Admitted to critical care for HTN emergency. Started on cardene drip in ED along with labetalol injection with improvement in BP, but SBP back >200 when weaning the cardene. Off cardene drip now. Per chart review -130 overnight. Since admission, blurry vision has improved. BV a little worst with light not really observed in the dark.  Patient denies any flashes, floaters, ocular pain, itching, burning, FBS, photosensitivity, double vision, headache, redness. First time patient has experience blurry vision. Prior episodes of elevated HTN requiring hospital admission, pt had only had headaches. Had not had an BV associated with elevated BP in past. Does not follow w/ ophthalmology            No new  subjective & objective note has been filed under this hospital service since the last note was generated.      Base Eye Exam     Visual Acuity       Right Left    Near sc 20/20-1 20/20-1          Tonometry (Tonopen, 2:08 PM)       Right Left    Pressure 25 24   Patient squinting/squeezing when getting measurements. May be falsely elevated.            Pupils       Pupils Dark Light Shape React APD    Right PERRL 4 3 Round Slow None    Left PERRL 4 3 Round Slow None          Visual Fields       Right Left     Full Full          Extraocular Movement       Right Left     Full, Ortho Full, Ortho          Dilation     Both eyes:  1% Tropicamide, 2.5% Phenylepherine  @ 12:08 PM            Slit Lamp and Fundus Exam     External Exam       Right Left    External Normal Normal          Slit Lamp Exam       Right Left    Lids/Lashes Normal Normal    Conjunctiva/Sclera White and quiet White and quiet    Cornea Clear Clear    Anterior Chamber Deep and quiet Deep and quiet    Iris Round and reactive Round and reactive    Lens Clear Clear    Vitreous Normal Normal          Fundus Exam       Right Left    Disc Normal, sharp borders of ONH Normal, sharp borders of ONH    C/D Ratio 0.3 0.3    Macula hard exudates noted in macula around fovea and superior to fovea. Possible macular edema at fovea, NO MAs,  No RD flame hemorrhage seen in superior macula, few DBH, Hard exudates perifoveal and inferior to macula; no obvious edema; however, appears like there was foveal edema and edema inferior to fovea that is now resolved, no MAs, No RD    Vessels attenuated  attenuated     Periphery DBH inferior to ONH Normal              Assessment and Plan:     Hypertensive retinopathy of both eyes, grade 3    -Patient with evidence of hypertensive changes including attenuated vessels, hard exudates, flame hemorrhages and DBH.  -No evidence of optic nerve edema.   -Likely macular edema seen OD; appears like there was possible macular edema OS that  has now resolved with remaining hard exudates. Edema likely contributing to continued blurry vision; however, patient is 20/20-1 BOTH EYES (Near)   -Encourage BP control.   -Recommend A1C testing. Some of changes seen on exam can be 2/2 diabetic retinopathy. No A1c on file.   -No emergent ophthalmologic interventions at this time.   -Will schedule follow up in 1 week with OCT imaging.   -Patient given Clinic phone number [phone] to follow up with scheduling since patient is still admitted.           Case discussed with Neuro-ophthal atteding, Dr. Engel      Thank you for your consult. I will sign off. Please contact us if you have any additional questions.    Jim Sylvester MD  Ophthalmology  [facility]

## 2018-09-19 NOTE — HPI
Ms. Patel is a 29 yo AAF with ESRD and hypertension who presented to ER with hypertensive emergency. She receives dialysis on Tuesday, Thursday and Saturday. She received her last full dialysis treatment on 9/15/18. She was due for her next HD treatment today but she was noted to be severely hypertensive and she also had blurred vision due to which she was sent to ER. Her BP was 230/149 when she presented to ER. Pt does report she had some shortness of breath when she presented. Her CXR showed cardiomegaly but did not show any fluid overload or pulmonary edema. She had troponin rise as noted in the ER. She was tachycardic when she presented to ER.     She was started on Cardene drip. She also received IV labetalol. She also received CT head earlier today which did not show any acute findings.     Her other comorbid conditions, past medical history and previous multiple admissions with similar complaints were reviewed.     She dialyzes at [facility] under the care of Dr. Bass.  She dialyzes for 3.5 hours and reports an EDW of 50.5 kg.  She has an AVF to her [facility]. She does not make any urine.     She was evaluated in the ICU. Her antihypertensive regimen was being adjusted by ICU team. Pt denied any ongoing SOB. She did admit problems with BP control.

## 2018-09-19 NOTE — ASSESSMENT & PLAN NOTE
- Blurry vision bilaterally but worse in L eye  - Improved on 9/19  - No current signs of infection, but concern due to immunosuppressed status and recent MSSA bacteremia, completed 2 week course of vancomycin.   - Consulted ophthalmology, appreciate recs.

## 2018-09-19 NOTE — ASSESSMENT & PLAN NOTE
She dialyzes at [facility] under the care of Dr. Bass.  She dialyzes for 3.5 hours and reports an EDW of 50.5 kg.  She has an AVF to her LFA. She does not make any urine.    Plan/recommendations:  HD today for metabolic clearance/volume management  UF goal of 3L as tolerated.    Continue strict I/O's.  Renal diet    Hypertensive emergency  Known history of non-compliance with OP blood pressure regimen.   She remains on cardene gtt and she was started on her OP prescription  Could have a component of hypervolemia as she above her EDW.  Will attempt a UF goal of 3L and continue trending BP    BMM  Renal diet  Hold off on phos binders for now as I anticipate clearance with HD  Phos levels daily  Continue cinacalcet    Anemia of ESRD  In setting of uncontrolled blood pressure, will hold MARIA T

## 2018-09-19 NOTE — PLAN OF CARE
No acute events throughout shift  MAP < 120  Pt AAOx4; follows commands/moves all extremities  Consults to liver transplant surgery & ophthalmology placed  Remains anuric   No new skin breakdown   HD started @ 1600  Plan of care reviewed w/ pt & pt's mother   Transfer orders in   All VS currently stable   All questions & concerns addressed  Will continue to monitor closely

## 2018-09-19 NOTE — HPI
Holly Patel is a 28 y.o. female with     PMH of ESRD (HD on Tues, Thurs, Sat), poorly controlled HTN, liver transplant at 1 year of age (currently on prograf and prednisone), HFpEF   Past Ocular History of NONE  Past Ocular Surgery of NONE  Family Ocular History of GLAUCOMA, CATARACTS    presenting with onset of blurry vision when she woke up yesterday morning (OD=OS). Pt went to dialysis yesterday and was sent to ED due to elevated BP, SBP in 220s. Baseline -180. Pt is compliant with her home BP meds 0.3mg clonidine patch daily, labetalol 500mg bid, hydralazine 100mg q12, irbesartan 300mg daily, and nifedipine 120mg daily. Patient had taken off clonidine patch the day before and forgot to put it back on. Admitted to critical care for HTN emergency. Started on cardene drip in ED along with labetalol injection with improvement in BP, but SBP back >200 when weaning the cardene. Off cardene drip now. Per chart review -130 overnight. Since admission, blurry vision has improved. BV a little worst with light not really observed in the dark.  Patient denies any flashes, floaters, ocular pain, itching, burning, FBS, photosensitivity, double vision, headache, redness. First time patient has experience blurry vision. Prior episodes of elevated HTN requiring hospital admission, pt had only had headaches. Had not had an BV associated with elevated BP in past. Does not follow w/ ophthalmology

## 2018-09-19 NOTE — ASSESSMENT & PLAN NOTE
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  - on Prograf, Cellcept, and Prednisone currently.   - prograf level 3.0  - hx of MSSA bacteremia in which pt recently completed 2 week course of Vancomycin. Will obtain blood cultures due to immunosuppressed status.   - sees Dr. Pinedo in liver transplant  - Consulted liver transplant, appreciate recs

## 2018-09-19 NOTE — H&P
[facility]  Critical Care Medicine  History & Physical    Patient Name: Holly Patel  MRN: [id number]  Admission Date: 9/18/2018  Hospital Length of Stay: 0 days  Code Status: Full Code  Attending Physician: Day Salcedo MD   Primary Care Provider: Stan Sosa MD   Principal Problem: Hypertensive emergency    Subjective:     HPI:  28 year old female with hx of ESRD (HD on Tues, Thurs, Sat), poorly controlled HTN, liver transplant at 1 year of age (currently on prograf and prednisone), HFpEF presents with onset of blurry vision when she woke up this morning. Pt went to dialysis today and was sent to ED due to elevated BP, SBP in 220s. Baseline -180. Pt is compliant with her home BP meds 0.3mg clonidine patch daily, labetalol 500mg bid, hydralazine 100mg q12, irbesartan 300mg daily, and nifedipine 120mg daily. She took off her clonidine patch yesterday due to itching and forgot to put it back on. Pt states her L eye is more blurry. She has an AV fistula to her LUE. Only other complaint is mild diffuse abdominal burning. Denies chest pain, shortness of breath, nausea, vomiting, dizziness. Pt had a recent admission for MSSA bacteremia with temp of 104.3, treated with 2 weeks of vancomycin. Of note, she had a LEEP in June 2018 that was complicated by persistent vaginal bleeding. Pt notes that the bleeding resolved after discharge and denies pelvic pain.      In ED, BP on arrival was 230/150, tachycardic to 120s-130s. Troponin elevated at 0.3 from patient's baseline of 0.03. BNP elevated as well at 1961 from previous of 1300. CXR negative for acute abnormality. EKG reveals sinus tachycardia. Pt required cardene infusion in the ED with improvement in her BP, persistent tachycardia.  She received IV labetalol with some improvement in heart rate to 110s.  When attempting to wean cardene, pt's BP increased back to >200 SBP.     Hospital/ICU Course:  No notes on file     Past Medical  History:   Diagnosis Date    Anemia in ESRD (end-stage renal disease) 10/12/2015    dialysis es, thursday, sat; access left arm    Chronic rejection of liver transplant 3/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 ()    Moderate protein-calorie malnutrition 2017    MRSA bacteremia 2017    Pneumonia     Prophylactic immunotherapy 2014    Renovascular hypertension 10/2/2015    Secondary hyperparathyroidism 2017    Seizures     Sialadenitis 3/21/2018    Thrombocytopenia 2016       Past Surgical History:   Procedure Laterality Date    BIOPSY-LIVER N/A 2017    Performed by [facility] Provider at [facility] OR 2ND FLR    BIOPSY-LIVER N/A 3/22/2016    [facility]     SECTION      x 2    CONIZATION OF CERVIX USING LOOP ELECTROSURGICAL EXCISION PROCEDURE (LEEP) N/A 6/15/2018    Procedure: CONIZATION-CERVICAL-LEEP;  Surgeon: Neelam Marroquin MD;  Location: [facility] OR;  Service: OB/GYN;  Laterality: N/A;    CONIZATION-CERVICAL-LEEP N/A 6/15/2018    Performed by Neelam Marroquin MD at [facility] OR    [id number]; upper extremity Left 2015    Performed by Idalia Diaz MD at [facility] OR 2ND FLR    EMBOLIZATION N/A 2018    Procedure: EMBOLIZATION, BLOOD VESSEL;  Surgeon: Aren Ramos MD;  Location: [facility] CATH LAB;  Service: Radiology;  Laterality: N/A;    EMBOLIZATION, BLOOD VESSEL N/A 2018    Performed by Aren Ramos MD at [facility] CATH LAB    Exam Under Anesthesia N/A 2018    Performed by  [facility]am [facility]quin MD at [facility] OR 2ND FLR    Exam under anesthesia N/A 6/19/2018    Performed by [facility]quin MD at [facility] OR    Exam under anesthesia (ADD ON ) N/A 7/9/2018    Performed by NICKIE Alvarez MD at [facility] OR    Exam under anesthesia -cervical suturing  N/A 7/26/2018    Performed by Lei Sims III, MD at [facility] OR    EXAMINATION UNDER ANESTHESIA N/A 6/19/2018    Procedure: Exam under anesthesia;  Surgeon: Neelam Marroquin MD;  Location: [facility];  Service: OB/GYN;  Laterality: N/A;    EXAMINATION UNDER ANESTHESIA N/A 7/9/2018    Procedure: Exam under anesthesia (ADD ON );  Surgeon: NICKIE Alvraez MD;  Location: [facility];  Service: OB/GYN;  Laterality: N/A;  (ADD ON )    EXAMINATION UNDER ANESTHESIA N/A 7/26/2018    Procedure: Exam under anesthesia -cervical suturing ;  Surgeon: Lei Sims III, MD;  Location: [facility];  Service: OB/GYN;  Laterality: N/A;    EXAMINATION UNDER ANESTHESIA N/A 8/8/2018    Procedure: Exam Under Anesthesia;  Surgeon: Neelam Marroquin MD;  Location: [facility] OR 2ND FLR;  Service: OB/GYN;  Laterality: N/A;  need endoloop  request 12 noon    FISTULOGRAM Left 12/4/2015    Performed by Idalia Diaz MD at [facility] CATH LAB    LIVER BIOPSY      LIVER TRANSPLANT  09/1992    PERINEORRHAPHY  6/19/2018    Procedure: SUTURE REPAIR,CERVIX;  Surgeon: Neelam Marroquin MD;  Location: [facility];  Service: OB/GYN;;    SUTURE REPAIR,CERVIX  6/19/2018    Performed by Neelam Marroquin MD at [facility] OR    TUBAL LIGATION  2010       Review of patient's allergies indicates:   Allergen Reactions    Chloral hydrate      Other reaction(s): Hallucinations  Other reaction(s): Hives    Hydrocodone Other (See Comments)     Mental status changes       Family History     Problem Relation (Age of Onset)    Hypertension Mother, Father        Tobacco Use    Smoking status: Never Smoker    Smokeless tobacco: Never Used   Substance and Sexual Activity    Alcohol use: No     Drug use: No    Sexual activity: No     Partners: Male      Review of Systems   Constitutional: Negative for chills and fever.   HENT: Negative for sore throat.    Eyes: Positive for visual disturbance.   Respiratory: Negative for cough and shortness of breath.    Cardiovascular: Negative for chest pain.   Gastrointestinal: Positive for abdominal pain. Negative for diarrhea, nausea and vomiting.   Genitourinary: Negative for dysuria.   Musculoskeletal: Negative for back pain.   Skin: Negative for rash and wound.   Neurological: Negative for dizziness, syncope, light-headedness and headaches.     Objective:     Vital Signs (Most Recent):  Temp: 97.9 °F (36.6 °C) (09/18/18 1828)  Pulse: 99 (09/18/18 1834)  Resp: (!) 28 (09/18/18 1834)  BP: (!) 192/125 (09/18/18 1834)  SpO2: 100 % (09/18/18 1834) Vital Signs (24h Range):  Temp:  [97.9 °F (36.6 °C)-98.6 °F (37 °C)] 97.9 °F (36.6 °C)  Pulse:  [] 99  Resp:  [5-62] 28  SpO2:  [96 %-100 %] 100 %  BP: (149-230)/() 192/125   Weight: 56.2 kg (123 lb 14.4 oz)  Body mass index is 20.62 kg/m².    No intake or output data in the 24 hours ending 09/18/18 1856    Physical Exam   Constitutional: She is oriented to person, place, and time. She appears well-developed and well-nourished. No distress.   HENT:   Head: Normocephalic and atraumatic.   Mouth/Throat: Oropharynx is clear and moist.   Edematous face   Neck: Neck supple.   Cardiovascular: Normal rate and regular rhythm.   AV fistula to LUE   Pulmonary/Chest: Effort normal and breath sounds normal. No stridor. No respiratory distress. She has no wheezes.   Abdominal: Soft. Bowel sounds are normal. She exhibits distension. There is no tenderness. There is no guarding.   Musculoskeletal: She exhibits no edema or deformity.   Neurological: She is alert and oriented to person, place, and time. She has normal strength. No cranial nerve deficit or sensory deficit.   Skin: Skin is warm and dry. She is not diaphoretic.  There is erythema. There is pallor.   Psychiatric: She has a normal mood and affect. Her behavior is normal.   Nursing note and vitals reviewed.      Vents:     Lines/Drains/Airways     Drain                 Hemodialysis AV Fistula Left forearm -- days          Peripheral Intravenous Line                 Peripheral IV - Single Lumen 09/18/18 0810 Right Antecubital less than 1 day              Significant Labs:    CBC/Anemia Profile:  Recent Labs   Lab  09/18/18   0814  09/18/18   0819  09/18/18   1458   WBC   --   4.82   --    HGB   --   9.0*   --    HCT  27*  29.1*  25*   PLT   --   58*   --    MCV   --   89   --    RDW   --   18.6*   --         Chemistries:  Recent Labs   Lab  09/18/18   0819   NA  140   K  3.9   CL  103   CO2  25   BUN  45*   CREATININE  8.7*   CALCIUM  9.5   ALBUMIN  2.9*   PROT  8.2   BILITOT  1.2*   ALKPHOS  673*   ALT  32   AST  32   MG  2.4   PHOS  4.5     Troponin:   Recent Labs   Lab  09/18/18   0819  09/18/18   1456   TROPONINI  0.308*  0.420*       Significant Imaging:   Procedure Component Value Units Date/Time   CT Head Without Contrast [[id number]] Resulted: 09/18/18 1813   Order Status: Completed Updated: 09/18/18 1815   Narrative:     EXAMINATION:  CT HEAD WITHOUT CONTRAST    CLINICAL HISTORY:  Dizziness;Hypertension;    TECHNIQUE:  Low dose axial images were obtained through the head.  Coronal and sagittal reformations were also performed. Contrast was not administered.    COMPARISON:  05/01/2018    FINDINGS:  The brain is normally formed and exhibits normal density throughout.  There is no evidence of acute major vascular territory infarct, hemorrhage, or mass.  There is no hydrocephalus.  There are no abnormal extra-axial fluid collections.  The paranasal sinuses and mastoid air cells are clear, and there is no evidence of calvarial fracture.  The visualized soft tissues are unremarkable.   Impression:       1. No acute intracranial abnormalities.      Electronically signed by:  Raheem Camacho MD  Date: 09/18/2018  Time: 18:13     Assessment/Plan:     Cardiac/Vascular   * Hypertensive emergency    - Will resume home regimen of 0.3mg clonidine patch daily, labetalol 500mg bid, hydralazine 100mg q12, irbesartan 300mg daily, and nifedipine 120mg daily.  - started on cardene drip in ED along with labetalol injection with improvement in BP, but SBP back >200 when weaning the cardene. Off cardene drip for now.  - Given extra dose of 0.3mg clonidine in ED.   - MAP goal of 115-120.  - F/u CT head  - Nephrology recs no emergent dialysis needed at this time. Plan for HD on 9/19.         Renovascular hypertension    - Will resume home regimen of 0.3mg clonidine patch daily, labetalol 500mg bid, hydralazine 100mg q12, irbesartan 300mg daily, and nifedipine 120mg daily.        Renal/   ESRD on hemodialysis    - BUN/Cr 45/8.7  - Nephrology recs no emergent dialysis needed at this time. Plan for HD on 9/19.         Immunology/Multi System   Immunosuppressed    - see liver transplant        GI   Liver replaced by transplant    - hx liver transplant at 1 year of age. Hemangioendothelioma s/p LTx (1992).  - on Prograf and Prednisone currently  - will obtain prograf level  - hx of MSSA bacteremia in which pt recently completed 2 week course of Vancomycin. Will obtain blood cultures due to immunosuppressed status.            Critical secondary to Patient has a condition that poses threat to life and bodily function: Hypertensive emergency     Critical care was time spent personally by me on the following activities: development of treatment plan with patient or surrogate and bedside caregivers, discussions with consultants, evaluation of patient's response to treatment, examination of patient, ordering and performing treatments and interventions, ordering and review of laboratory studies, ordering and review of radiographic studies, pulse oximetry, re-evaluation of patient's condition. This critical care  time did not overlap with that of any other provider or involve time for any procedures.     Kennedy Loco MD  Critical Care Medicine  [facility]

## 2018-09-19 NOTE — PROGRESS NOTES
[facility]  Critical Care Medicine  Progress Note    Patient Name: Holly Patel  MRN: [id number]  Admission Date: 9/18/2018  Hospital Length of Stay: 1 days  Code Status: Full Code  Attending Provider: Day Salcedo MD  Primary Care Provider: Stan Sosa MD   Principal Problem: Hypertensive emergency    Subjective:     HPI:  28 year old female with hx of ESRD (HD on Tues, Thurs, Sat), poorly controlled HTN, liver transplant at 1 year of age (currently on prograf and prednisone), HFpEF presents with onset of blurry vision when she woke up this morning. Pt went to dialysis today and was sent to ED due to elevated BP, SBP in 220s. Baseline -180. Pt is compliant with her home BP meds 0.3mg clonidine patch daily, labetalol 500mg bid, hydralazine 100mg q12, irbesartan 300mg daily, and nifedipine 120mg daily. She took off her clonidine patch yesterday due to itching and forgot to put it back on. Pt states her L eye is more blurry. She has an AV fistula to her LUE. Only other complaint is mild diffuse abdominal burning. Denies chest pain, shortness of breath, nausea, vomiting, dizziness. Pt had a recent admission for MSSA bacteremia with temp of 104.3, treated with 2 weeks of vancomycin. Of note, she had a LEEP in June 2018 that was complicated by persistent vaginal bleeding. Pt notes that the bleeding resolved after discharge and denies pelvic pain.      In ED, BP on arrival was 230/150, tachycardic to 120s-130s. Troponin elevated at 0.3 from patient's baseline of 0.03. BNP elevated as well at 1961 from previous of 1300. CXR negative for acute abnormality. EKG reveals sinus tachycardia. Pt required cardene infusion in the ED with improvement in her BP, persistent tachycardia.  She received IV labetalol with some improvement in heart rate to 110s.  When attempting to wean cardene, pt's BP increased back to >200 SBP.     Hospital/ICU Course:  9/18: Admitted to MICU. BP remained  elevated, so restarted cardene drip overnight with MAP goal 120-130. Restarted home BP meds, which are 0.3mg clonidine patch daily, labetalol 500mg bid, hydralazine 100mg q12, irbesartan 300mg daily, and nifedipine 120mg daily.   9/19: BP improved. -125. Will wean off cardene drip. Continue home BP regimen. Plan for HD. Wean O2. F/u ophthalmology and liver transplant recs.     Interval History/Significant Events: NAEON. Pt reports improvement in blurry vision. MAP down to 116, will wean off cardene drip.     Review of Systems   Constitutional: Negative for chills and fever.   HENT: Negative for sore throat.    Eyes: Positive for visual disturbance.   Respiratory: Positive for shortness of breath. Negative for cough.    Cardiovascular: Negative for chest pain.   Gastrointestinal: Negative for abdominal pain, diarrhea, nausea and vomiting.   Genitourinary: Negative for dysuria.   Musculoskeletal: Negative for back pain.   Skin: Negative for rash and wound.   Neurological: Negative for dizziness, syncope, light-headedness and headaches.     Objective:     Vital Signs (Most Recent):  Temp: 98 °F (36.7 °C) (09/19/18 1100)  Pulse: 82 (09/19/18 1215)  Resp: 19 (09/19/18 1215)  BP: (!) 142/90 (09/19/18 1215)  SpO2: 100 % (09/19/18 1215) Vital Signs (24h Range):  Temp:  [97.9 °F (36.6 °C)-98.2 °F (36.8 °C)] 98 °F (36.7 °C)  Pulse:  [] 82  Resp:  [0-82] 19  SpO2:  [89 %-100 %] 100 %  BP: (136-220)/() 142/90   Weight: 56.2 kg (123 lb 14.4 oz)  Body mass index is 20.62 kg/m².      Intake/Output Summary (Last 24 hours) at 9/19/2018 1220  Last data filed at 9/19/2018 0500  Gross per 24 hour   Intake 530 ml   Output --   Net 530 ml       Physical Exam   Constitutional: She is oriented to person, place, and time. She appears well-developed and well-nourished. No distress.   HENT:   Head: Normocephalic and atraumatic.   Mouth/Throat: Oropharynx is clear and moist.   Edematous face   Neck: Neck supple.    Cardiovascular: Normal rate and regular rhythm.   AV fistula to LUE   Pulmonary/Chest: Effort normal and breath sounds normal. No stridor. No respiratory distress. She has no wheezes.   Abdominal: Soft. Bowel sounds are normal. She exhibits distension. There is no tenderness. There is no guarding.   Musculoskeletal: She exhibits no edema or deformity.   Neurological: She is alert and oriented to person, place, and time. She has normal strength. No cranial nerve deficit or sensory deficit.   Skin: Skin is warm and dry. She is not diaphoretic. No erythema. No pallor.   Psychiatric: She has a normal mood and affect. Her behavior is normal.   Nursing note and vitals reviewed.    Vents:     Lines/Drains/Airways     Drain                 Hemodialysis AV Fistula Left forearm -- days          Peripheral Intravenous Line                 Peripheral IV - Single Lumen 09/18/18 0810 Right Antecubital 1 day         Peripheral IV - Single Lumen 09/18/18 2028 Anterior;Right Forearm less than 1 day              Significant Labs:    CBC/Anemia Profile:  Recent Labs   Lab  09/18/18   0819 09/18/18   1458  09/19/18   0329   WBC  4.82   --   3.59*   HGB  9.0*   --   7.2*   HCT  29.1*  25*  22.6*   PLT  58*   --   51*   MCV  89   --   88   RDW  18.6*   --   18.4*        Chemistries:  Recent Labs   Lab  09/18/18   0819 09/19/18   0329   NA  140  139   K  3.9  4.3   CL  103  105   CO2  25  21*   BUN  45*  53*   CREATININE  8.7*  9.9*   CALCIUM  9.5  8.6*   ALBUMIN  2.9*  2.4*   PROT  8.2  6.7   BILITOT  1.2*  0.7   ALKPHOS  673*  586*   ALT  32  30   AST  32  45*   MG  2.4  2.4   PHOS  4.5  5.3*     Blood Culture:   Recent Labs   Lab  09/18/18   1750  09/18/18   1756   LABBLOO  No Growth to date  No Growth to date     Troponin:   Recent Labs   Lab  09/18/18   0819  09/18/18   1456 09/18/18 2004   TROPONINI  0.308*  0.420*  0.540*       Significant Imaging:  I have reviewed all pertinent imaging results/findings within the past 24  hours.    Assessment/Plan:     Ophtho   Blurry vision, bilateral    - Blurry vision bilaterally but worse in L eye  - Improved on 9/19  - No current signs of infection, but concern due to immunosuppressed status and recent MSSA bacteremia, completed 2 week course of vancomycin.   - Consulted ophthalmology, appreciate recs.        Cardiac/Vascular   * Hypertensive emergency    - Resumed home regimen of 0.3mg clonidine patch daily, labetalol 500mg bid, hydralazine 100mg q12, irbesartan 300mg daily, and nifedipine 120mg daily.  - Off cardene drip for now.  - MAP goal of 120-130.  - CT head negative  - Plan for HD on 9/19.         Renovascular hypertension    - Will resume home regimen of 0.3mg clonidine patch daily, labetalol 500mg bid, hydralazine 100mg q12, irbesartan 300mg daily, and nifedipine 120mg daily.        Renal/   ESRD on hemodialysis    - BUN/Cr 53/9.9  - Doesn't make urine  - Plan for HD on 9/19.         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  - on Prograf, Cellcept, and Prednisone currently.   - prograf level 3.0  - hx of MSSA bacteremia in which pt recently completed 2 week course of Vancomycin. Will obtain blood cultures due to immunosuppressed status.   - sees Dr. Pinedo in liver transplant  - Consulted liver transplant, appreciate recs         Other   Shortness of breath    - currently on 2L O2, will wean           Critical secondary to Patient has a condition that poses threat to life and bodily function: Hypertensive emergency      Critical care was time spent personally by me on the following activities: development of treatment plan with patient or surrogate and bedside caregivers, discussions with consultants, evaluation of patient's response to treatment, examination of patient, ordering and performing treatments and interventions, ordering and review of  laboratory studies, ordering and review of radiographic studies, pulse oximetry, re-evaluation of patient's condition. This critical care time did not overlap with that of any other provider or involve time for any procedures.     Kennedy Loco MD  Critical Care Medicine  [facility]

## 2018-09-19 NOTE — PROGRESS NOTES
[facility]  Nephrology  Progress Note    Patient Name: Holly Patel  MRN: [id number]  Admission Date: 9/18/2018  Hospital Length of Stay: 1 days  Attending Provider: Day Salcedo MD   Primary Care Physician: Stan Sosa MD  Principal Problem:Hypertensive emergency    Subjective:     HPI: Ms. Patel is a 29 yo AAF with ESRD and hypertension who presented to ER with hypertensive emergency. She receives dialysis on Tuesday, Thursday and Saturday. She received her last full dialysis treatment on 9/15/18. She was due for her next HD treatment today but she was noted to be severely hypertensive and she also had blurred vision due to which she was sent to ER. Her BP was 230/149 when she presented to ER. Pt does report she had some shortness of breath when she presented. Her CXR showed cardiomegaly but did not show any fluid overload or pulmonary edema. She had troponin rise as noted in the ER. She was tachycardic when she presented to ER.     She was started on Cardene drip. She also received IV labetalol. She also received CT head earlier today which did not show any acute findings.     Her other comorbid conditions, past medical history and previous multiple admissions with similar complaints were reviewed.     She dialyzes at [facility] under the care of Dr. Bass.  She dialyzes for 3.5 hours and reports an EDW of 50.5 kg.  She has an AVF to her LFA. She does not make any urine.     She was evaluated in the ICU. Her antihypertensive regimen was being adjusted by ICU team. Pt denied any ongoing SOB. She did admit problems with BP control.           Interval History:   Remains on cardene gtt this morning.  She denies any complaints, oxygenating well w/o distress.  Known from previous admissions for recurrent vaginal bleeding from LEAP procedure in which Gyn surgery was thought 2/2 uncontrolled hypertension.  Nephrology attempted to change her blood pressure medications to encourage  compliance.  She reports an improvement in blood pressures since discharge last time, but continues to have hypertensive episodes at dialysis according to her which is improved with UF and PRN clonidine.    Today she is around 6 kg above her EDW per bed scale, but she is reporting she was only 2L above her EDW when she arrived at dialysis yesterday.    Review of patient's allergies indicates:   Allergen Reactions    Chloral hydrate      Other reaction(s): Hallucinations  Other reaction(s): Hives    Hydrocodone Other (See Comments)     Mental status changes     Current Facility-Administered Medications   Medication Frequency    0.9%  NaCl infusion Once    acetaminophen tablet 650 mg Q4H PRN    aspirin chewable tablet 81 mg Daily    cinacalcet tablet 30 mg Daily with breakfast    cloNIDine 0.3 mg/24 hr td ptwk 1 patch Q7 Days    famotidine tablet 20 mg Daily    heparin (porcine) injection 5,000 Units Q8H    hydrALAZINE tablet 100 mg Q12H    hydrOXYzine HCl tablet 25 mg TID PRN    irbesartan tablet 300 mg Daily    labetalol tablet 500 mg Q12H    mycophenolate capsule 250 mg BID    niCARdipine 40 mg/200 mL infusion Continuous    NIFEdipine 24 hr tablet 120 mg Daily    predniSONE tablet 1 mg Every other day    promethazine tablet 25 mg Q6H PRN    sodium chloride 0.9% flush 3 mL PRN    tacrolimus capsule 6 mg BID       Objective:     Vital Signs (Most Recent):  Temp: 98 °F (36.7 °C) (09/19/18 1100)  Pulse: 86 (09/19/18 1100)  Resp: 14 (09/19/18 1100)  BP: (!) 149/95 (09/19/18 1100)  SpO2: 100 % (09/19/18 1100)  O2 Device (Oxygen Therapy): nasal cannula (09/19/18 1100) Vital Signs (24h Range):  Temp:  [97.9 °F (36.6 °C)-98.2 °F (36.8 °C)] 98 °F (36.7 °C)  Pulse:  [] 86  Resp:  [0-82] 14  SpO2:  [89 %-100 %] 100 %  BP: (137-220)/() 149/95     Weight: 56.2 kg (123 lb 14.4 oz) (09/18/18 1828)  Body mass index is 20.62 kg/m².  Body surface area is 1.61 meters squared.    I/O last 3 completed  shifts:  In: 530 [P.O.:480; I.V.:50]  Out: -     Physical Exam   Constitutional: She is oriented to person, place, and time. She appears well-developed and well-nourished. No distress. Nasal cannula in place.   HENT:   Head: Normocephalic and atraumatic.   Right Ear: External ear normal.   Left Ear: External ear normal.   Eyes: Conjunctivae and EOM are normal. Right eye exhibits no discharge. Left eye exhibits no discharge.   Neck: Normal range of motion. Neck supple. JVD present.   Cardiovascular: Normal rate and regular rhythm. Exam reveals no gallop and no friction rub.   No murmur heard.  Pulmonary/Chest: Effort normal and breath sounds normal. No respiratory distress. She has no wheezes. She has no rales.   Abdominal: Soft. Bowel sounds are normal. She exhibits no distension. There is no tenderness.   Musculoskeletal: Normal range of motion. She exhibits no edema or deformity.   Neurological: She is alert and oriented to person, place, and time.   Skin: Skin is warm and dry. She is not diaphoretic.       Significant Labs:  CBC:   Recent Labs   Lab  09/19/18 0329   WBC  3.59*   RBC  2.57*   HGB  7.2*   HCT  22.6*   PLT  51*   MCV  88   MCH  28.0   MCHC  31.9*     CMP:   Recent Labs   Lab  09/19/18 0329   GLU  139*   CALCIUM  8.6*   ALBUMIN  2.4*   PROT  6.7   NA  139   K  4.3   CO2  21*   CL  105   BUN  53*   CREATININE  9.9*   ALKPHOS  586*   ALT  30   AST  45*   BILITOT  0.7            Assessment/Plan:     ESRD on hemodialysis    She dialyzes at [facility] under the care of Dr. Bass.  She dialyzes for 3.5 hours and reports an EDW of 50.5 kg.  She has an AVF to her LFA. She does not make any urine.    Plan/recommendations:  HD today for metabolic clearance/volume management  UF goal of 3L as tolerated.    Continue strict I/O's.  Renal diet    Hypertensive emergency  Known history of non-compliance with OP blood pressure regimen.   She remains on cardene gtt and she was started on her OP  prescription  Could have a component of hypervolemia as she above her EDW.  Will attempt a UF goal of 3L and continue trending BP    BMM  Renal diet  Hold off on phos binders for now as I anticipate clearance with HD  Phos levels daily  Continue cinacalcet    Anemia of ESRD  In setting of uncontrolled blood pressure, will hold MARIA T           Raheem Trujillo NP  Nephrology  [facility]  Pager:  [phone]

## 2018-09-19 NOTE — PLAN OF CARE
Problem: Patient Care Overview  Goal: Plan of Care Review  9/18: Patient received to SICU for hypertensive crisis  Outcome: Ongoing (interventions implemented as appropriate)  Patient on 2 L NC SATs 100%. Patient felt uncomfortable and stated that she had received oxygen in the past for when she sleeps. HR between  BPM. Afebrile through shift. MAPs maintained 120-130 through shift. Cardene drip currently off for patient, patient MAP of 124, see flowsheet for further details. IV inserted in right forearm and AC, Cardene site to be changed q12 hours if continued. Neuro checks done q1, see flowsheet for further details, no changed through shift in regards to neuro assessment. Labs sent, PRNs given. Will continue to monitor through shift.

## 2018-09-19 NOTE — ASSESSMENT & PLAN NOTE
- Resumed home regimen of 0.3mg clonidine patch daily, labetalol 500mg bid, hydralazine 100mg q12, irbesartan 300mg daily, and nifedipine 120mg daily.  - Off cardene drip for now.  - MAP goal of 120-130.  - CT head negative  - Plan for HD on 9/19.

## 2018-09-19 NOTE — SUBJECTIVE & OBJECTIVE
Interval History:   Remains on cardene gtt this morning.  She denies any complaints, oxygenating well w/o distress.  Known from previous admissions for recurrent vaginal bleeding from LEAP procedure in which Gyn surgery was thought 2/2 uncontrolled hypertension.  Nephrology attempted to change her blood pressure medications to encourage compliance.  She reports an improvement in blood pressures since discharge last time, but continues to have hypertensive episodes at dialysis according to her which is improved with UF and PRN clonidine.    Today she is around 6 kg above her EDW per bed scale, but she is reporting she was only 2L above her EDW when she arrived at dialysis yesterday.    Review of patient's allergies indicates:   Allergen Reactions    Chloral hydrate      Other reaction(s): Hallucinations  Other reaction(s): Hives    Hydrocodone Other (See Comments)     Mental status changes     Current Facility-Administered Medications   Medication Frequency    0.9%  NaCl infusion Once    acetaminophen tablet 650 mg Q4H PRN    aspirin chewable tablet 81 mg Daily    cinacalcet tablet 30 mg Daily with breakfast    cloNIDine 0.3 mg/24 hr td ptwk 1 patch Q7 Days    famotidine tablet 20 mg Daily    heparin (porcine) injection 5,000 Units Q8H    hydrALAZINE tablet 100 mg Q12H    hydrOXYzine HCl tablet 25 mg TID PRN    irbesartan tablet 300 mg Daily    labetalol tablet 500 mg Q12H    mycophenolate capsule 250 mg BID    niCARdipine 40 mg/200 mL infusion Continuous    NIFEdipine 24 hr tablet 120 mg Daily    predniSONE tablet 1 mg Every other day    promethazine tablet 25 mg Q6H PRN    sodium chloride 0.9% flush 3 mL PRN    tacrolimus capsule 6 mg BID       Objective:     Vital Signs (Most Recent):  Temp: 98 °F (36.7 °C) (09/19/18 1100)  Pulse: 86 (09/19/18 1100)  Resp: 14 (09/19/18 1100)  BP: (!) 149/95 (09/19/18 1100)  SpO2: 100 % (09/19/18 1100)  O2 Device (Oxygen Therapy): nasal cannula (09/19/18 1100)  Vital Signs (24h Range):  Temp:  [97.9 °F (36.6 °C)-98.2 °F (36.8 °C)] 98 °F (36.7 °C)  Pulse:  [] 86  Resp:  [0-82] 14  SpO2:  [89 %-100 %] 100 %  BP: (137-220)/() 149/95     Weight: 56.2 kg (123 lb 14.4 oz) (09/18/18 1828)  Body mass index is 20.62 kg/m².  Body surface area is 1.61 meters squared.    I/O last 3 completed shifts:  In: 530 [P.O.:480; I.V.:50]  Out: -     Physical Exam   Constitutional: She is oriented to person, place, and time. She appears well-developed and well-nourished. No distress. Nasal cannula in place.   HENT:   Head: Normocephalic and atraumatic.   Right Ear: External ear normal.   Left Ear: External ear normal.   Eyes: Conjunctivae and EOM are normal. Right eye exhibits no discharge. Left eye exhibits no discharge.   Neck: Normal range of motion. Neck supple. JVD present.   Cardiovascular: Normal rate and regular rhythm. Exam reveals no gallop and no friction rub.   No murmur heard.  Pulmonary/Chest: Effort normal and breath sounds normal. No respiratory distress. She has no wheezes. She has no rales.   Abdominal: Soft. Bowel sounds are normal. She exhibits no distension. There is no tenderness.   Musculoskeletal: Normal range of motion. She exhibits no edema or deformity.   Neurological: She is alert and oriented to person, place, and time.   Skin: Skin is warm and dry. She is not diaphoretic.       Significant Labs:  CBC:   Recent Labs   Lab  09/19/18   0329   WBC  3.59*   RBC  2.57*   HGB  7.2*   HCT  22.6*   PLT  51*   MCV  88   MCH  28.0   MCHC  31.9*     CMP:   Recent Labs   Lab  09/19/18 0329   GLU  139*   CALCIUM  8.6*   ALBUMIN  2.4*   PROT  6.7   NA  139   K  4.3   CO2  21*   CL  105   BUN  53*   CREATININE  9.9*   ALKPHOS  586*   ALT  30   AST  45*   BILITOT  0.7

## 2018-09-20 PROBLEM — R06.02 SHORTNESS OF BREATH: Status: RESOLVED | Noted: 2018-01-01 | Resolved: 2018-01-01

## 2018-09-20 PROBLEM — H53.8 BLURRY VISION, BILATERAL: Status: RESOLVED | Noted: 2018-01-01 | Resolved: 2018-01-01

## 2018-09-20 NOTE — PROGRESS NOTES
[facility]  Nephrology  Progress Note    Patient Name: Holly Patel  MRN: [id number]  Admission Date: 9/18/2018  Hospital Length of Stay: 2 days  Attending Provider: Day Salcedo MD   Primary Care Physician: Stan Sosa MD  Principal Problem:Hypertensive emergency    Subjective:     HPI: Ms. Patel is a 29 yo AAF with ESRD and hypertension who presented to ER with hypertensive emergency. She receives dialysis on Tuesday, Thursday and Saturday. She received her last full dialysis treatment on 9/15/18. She was due for her next HD treatment today but she was noted to be severely hypertensive and she also had blurred vision due to which she was sent to ER. Her BP was 230/149 when she presented to ER. Pt does report she had some shortness of breath when she presented. Her CXR showed cardiomegaly but did not show any fluid overload or pulmonary edema. She had troponin rise as noted in the ER. She was tachycardic when she presented to ER.     She was started on Cardene drip. She also received IV labetalol. She also received CT head earlier today which did not show any acute findings.     Her other comorbid conditions, past medical history and previous multiple admissions with similar complaints were reviewed.     She dialyzes at [facility] under the care of Dr. Bass.  She dialyzes for 3.5 hours and reports an EDW of 50.5 kg.  She has an AVF to her LFA. She does not make any urine.     She was evaluated in the ICU. Her antihypertensive regimen was being adjusted by ICU team. Pt denied any ongoing SOB. She did admit problems with BP control.           Interval History:   Tolerated HD yesterday with 3L net fluid removal.  BP better controlled today, remains off cardene gtt.  Plans for discharge today.    Review of patient's allergies indicates:   Allergen Reactions    Chloral hydrate      Other reaction(s): Hallucinations  Other reaction(s): Hives    Hydrocodone Other (See Comments)      Mental status changes     Current Facility-Administered Medications   Medication Frequency    0.9%  NaCl infusion Once    acetaminophen tablet 650 mg Q4H PRN    aspirin chewable tablet 81 mg Daily    cinacalcet tablet 30 mg Daily with breakfast    cloNIDine 0.3 mg/24 hr td ptwk 1 patch Q7 Days    famotidine tablet 20 mg Daily    heparin (porcine) injection 5,000 Units Q8H    hydrALAZINE tablet 100 mg Q12H    hydrOXYzine HCl tablet 25 mg TID PRN    irbesartan tablet 300 mg Daily    labetalol tablet 500 mg Q12H    NIFEdipine 24 hr tablet 120 mg Daily    promethazine tablet 25 mg Q6H PRN    sodium chloride 0.9% flush 3 mL PRN    tacrolimus capsule 6 mg BID       Objective:     Vital Signs (Most Recent):  Temp: 98.2 °F (36.8 °C) (09/20/18 0700)  Pulse: 81 (09/20/18 1200)  Resp: (!) 28 (09/20/18 1200)  BP: (!) 139/91 (09/20/18 1200)  SpO2: 100 % (09/20/18 1200)  O2 Device (Oxygen Therapy): room air (09/20/18 1100) Vital Signs (24h Range):  Temp:  [97.9 °F (36.6 °C)-98.2 °F (36.8 °C)] 98.2 °F (36.8 °C)  Pulse:  [] 81  Resp:  [6-36] 28  SpO2:  [92 %-100 %] 100 %  BP: (127-165)/() 139/91     Weight: 56.2 kg (123 lb 14.4 oz) (09/18/18 1828)  Body mass index is 20.62 kg/m².  Body surface area is 1.61 meters squared.    I/O last 3 completed shifts:  In: 1188 [P.O.:480; I.V.:108; Other:600]  Out: 3600 [Other:3600]    Physical Exam   Constitutional: She is oriented to person, place, and time. She appears well-developed and well-nourished. No distress.   HENT:   Head: Normocephalic and atraumatic.   Right Ear: External ear normal.   Left Ear: External ear normal.   Eyes: Conjunctivae and EOM are normal. Right eye exhibits no discharge. Left eye exhibits no discharge.   Neck: Normal range of motion. Neck supple. JVD present.   Cardiovascular: Normal rate and regular rhythm. Exam reveals no gallop and no friction rub.   No murmur heard.  Pulmonary/Chest: Effort normal and breath sounds normal. No  respiratory distress. She has no wheezes. She has no rales.   Abdominal: Soft. Bowel sounds are normal. She exhibits no distension. There is no tenderness.   Musculoskeletal: Normal range of motion. She exhibits no edema or deformity.   Neurological: She is alert and oriented to person, place, and time.   Skin: Skin is warm and dry. She is not diaphoretic.       Significant Labs:  CBC:   Recent Labs   Lab  09/20/18   0305   WBC  3.47*   RBC  2.83*   HGB  7.8*   HCT  25.0*   PLT  58*   MCV  88   MCH  27.6   MCHC  31.2*     CMP:   Recent Labs   Lab  09/20/18   0305   GLU  85   CALCIUM  7.8*   ALBUMIN  2.3*   PROT  6.4   NA  140   K  3.7   CO2  25   CL  105   BUN  26*   CREATININE  5.8*   ALKPHOS  545*   ALT  31   AST  35   BILITOT  0.6          Assessment/Plan:     ESRD on hemodialysis    She dialyzes at [facility] under the care of Dr. Bass.  She dialyzes for 3.5 hours and reports an EDW of 50.5 kg.  She has an AVF to her LFA. She does not make any urine.    Plan/recommendations:  HD today for metabolic clearance/volume management  UF goal of 3L as tolerated.  Will attempt to challenge EDW.  Will discuss with OP unit about new post weight today.  Continue strict I/O's.  Renal diet    Hypertensive emergency  Known history of non-compliance with OP blood pressure regimen.   She remains on cardene gtt and she was started on her OP prescription  Could have a component of hypervolemia as she above her EDW.  Will attempt a UF goal of 3L and continue trending BP    BMM  Renal diet  Phos levels daily  Continue cinacalcet    Anemia of ESRD  In setting of uncontrolled blood pressure, will hold MARIA T           Raheem Trujillo NP  Nephrology  [facility]  Pager:  [phone]

## 2018-09-20 NOTE — ASSESSMENT & PLAN NOTE
- Resumed home regimen of 0.3mg clonidine patch daily, labetalol 500mg bid, hydralazine 100mg q12, irbesartan 300mg daily, and nifedipine 120mg daily.  - Off cardene drip.  - MAP goal of 120-130. Well controlled now on home regimen.  - CT head negative  - HD on 9/19 and 9/20.

## 2018-09-20 NOTE — ASSESSMENT & PLAN NOTE
- Blurry vision bilaterally but worse in L eye  - Improved throughout stay  - No current signs of infection, but concern due to immunosuppressed status and recent MSSA bacteremia, completed 2 week course of vancomycin.   - Ophthalmology consulted, they see evidence of hypertensive changes- flame hemorrhages and hard exudates, but no intervention needed at this time.

## 2018-09-20 NOTE — ASSESSMENT & PLAN NOTE
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  - home regimen of Prograf, Cellcept, and Prednisone.   - prograf level 3.0  - hx of MSSA bacteremia in which pt recently completed 2 week course of Vancomycin, and was instructed to hold prograf, prednisone, and cellcept during those 2 weeks. Will obtain blood cultures due to immunosuppressed status; Bcx ngtd.   - sees Dr. Pinedo in liver transplant  - Hepatology curbside recs to hold prednisone and cellcept, but continue prograf. Instructed pt to follow up with liver transplant clinic after discharge to optimize dosing of immunosuppressant medications.

## 2018-09-20 NOTE — PROGRESS NOTES
Patient arrived on unit via stretcher. Weight obtained in bed @ 51.8kg. Dialysis initiated via left arm AVF with blunt button hole needles x 2 without difficulty. Lines connected and secured. Orders and machine settings verified. Tx started.

## 2018-09-20 NOTE — CARE UPDATE
Patient with frequent PVCs and HR in 140-150, with chest pain and sob.    ICD is [employer] and was replaced in 2017 (not 2007 as recorded in notes).  -Will have interrogated/programmed         Jeff Chavez MD  Internal Medicine PGY-2  [phone]

## 2018-09-20 NOTE — ASSESSMENT & PLAN NOTE
- Currently on home regimen of 0.3mg clonidine patch daily, labetalol 500mg bid, hydralazine 100mg q12, irbesartan 300mg daily, and nifedipine 120mg daily.

## 2018-09-20 NOTE — PLAN OF CARE
Stan Sosa MD  [address] [facility] [place] [address]    [id number] - [place], [place] - [address]  [address]  [place] [address]  Phone: [phone] Fax: [phone]    [facility]  [address]  [facility] [place] [address]  Phone: [phone] Fax: [phone]    Payor: MEDICARE / Plan: MEDICARE PART A & B / Product Type: Government /     Future Appointments   Date Time Provider Department Center   9/21/2018  8:30 AM LAB, [facility]   9/24/2018  9:00 AM Jennifer Ireland PA-C [facility] IM [address] [facility]   9/26/2018  7:00 AM [facility]   9/26/2018  8:00 AM KIDNEY, [facility] KIDNTX [facility]   9/26/2018  1:15 PM [facility] ONLY [facility]   9/26/2018  2:00 PM [facility] ONLY [facility]   9/26/2018  3:15 PM ECHO, [facility] ECHOLAB [facility]   10/1/2018  8:15 AM LAB, [facility]   11/26/2018 12:20 PM Stan Sosa MD [facility] [address] PC[facility]     Extended Emergency Contact Information  Primary Emergency Contact: AgustínMyrna  Address: [address] [place] [address] [place] [address] [facility]  Home Phone: [phone]  Mobile Phone: [phone]  Relation: Mother  Secondary Emergency Contact: River Gonzales   [facility]  Home Phone: [phone]  Mobile Phone: [phone]  Relation: Father  Preferred language: English   needed? No     09/20/18 1128   Discharge Assessment   Assessment Type Discharge Planning Assessment   Confirmed/corrected address and phone number on facesheet? Yes   Assessment information obtained from? Patient;Medical Record   Expected Length of Stay (days) 3   Communicated expected length of stay with patient/caregiver no   Prior to hospitilization cognitive status: Alert/Oriented   Prior to hospitalization functional status: Independent   Current cognitive status: Alert/Oriented   Current Functional Status: Independent   Facility Arrived  From: ED admit from HD facility    Lives With child(ester), dependent;parent(s)   Able to Return to Prior Arrangements yes   Is patient able to care for self after discharge? Yes   Who are your caregiver(s) and their phone number(s)? Myrna Randolph (Mother) [phone]      Patient's perception of discharge disposition home or selfcare   Readmission Within The Last 30 Days previous discharge plan unsuccessful   If yes, most recent facility name: [facility]   Patient currently being followed by outpatient case management? No   Patient currently receives home health services? No   Patient currently receives any other outside agency services? No   Equipment Currently Used at Home none   Do you have any problems affording any of your prescribed medications? TBD   Is the patient taking medications as prescribed? no   If no, which medications is patient not taking? anti HTN agents   Does the patient have transportation home? Yes   Transportation Available family or friend will provide   Dialysis Name and Scheduled days [facility] Dr Bass   Does the patient receive services at the [facility]? No   Discharge Plan A Home with family   Discharge Plan B Home   Patient/Family In Agreement With Plan yes   Does the patient currently use HME? No   Does the patient receive outpatient dialysis? Yes   Are there any open cases? No   Mayra Dotson, RN, BSN, CCM  Case Management  [facility]  Ext. [phone]

## 2018-09-20 NOTE — NURSING
Pt discharged, vital signs stable, belongings in patient possession. Vital signs can be found in flowsheet data with assessment details.

## 2018-09-20 NOTE — DISCHARGE SUMMARY
[facility]  Critical Care Medicine  Discharge Summary      Patient Name: Holly Patel  MRN: [id number]  Admission Date: 9/18/2018  Hospital Length of Stay: 2 days  Discharge Date and Time:  09/20/2018 1:06 PM  Attending Physician: Day Salcedo MD   Discharging Provider: Kennedy Loco MD  Primary Care Provider: Stan Sosa MD  Reason for Admission: Hypertensive emergency    HPI:   28 year old female with hx of ESRD (HD on Tues, Thurs, Sat), poorly controlled HTN, liver transplant at 1 year of age (currently on prograf and prednisone), HFpEF presents with onset of blurry vision when she woke up this morning. Pt went to dialysis today and was sent to ED due to elevated BP, SBP in 220s. Baseline -180. Pt is compliant with her home BP meds 0.3mg clonidine patch daily, labetalol 500mg bid, hydralazine 100mg q12, irbesartan 300mg daily, and nifedipine 120mg daily. She took off her clonidine patch yesterday due to itching and forgot to put it back on. Pt states her L eye is more blurry. She has an AV fistula to her LUE. Only other complaint is mild diffuse abdominal burning. Denies chest pain, shortness of breath, nausea, vomiting, dizziness. Pt had a recent admission for MSSA bacteremia with temp of 104.3, treated with 2 weeks of vancomycin. Of note, she had a LEEP in June 2018 that was complicated by persistent vaginal bleeding. Pt notes that the bleeding resolved after discharge and denies pelvic pain.      In ED, BP on arrival was 230/150, tachycardic to 120s-130s. Troponin elevated at 0.3 from patient's baseline of 0.03. BNP elevated as well at 1961 from previous of 1300. CXR negative for acute abnormality. EKG reveals sinus tachycardia. Pt required cardene infusion in the ED with improvement in her BP, persistent tachycardia.  She received IV labetalol with some improvement in heart rate to 110s.  When attempting to wean cardene, pt's BP increased back to >200 SBP.     *  No surgery found *    Indwelling Lines/Drains at Time of Discharge:   Lines/Drains/Airways     Drain                 Hemodialysis AV Fistula Left forearm -- days              Hospital Course:   9/18: Admitted to MICU. BP remained elevated, so restarted cardene drip overnight with MAP goal 120-130. Restarted home BP meds, which are 0.3mg clonidine patch daily, labetalol 500mg bid, hydralazine 100mg q12, irbesartan 300mg daily, and nifedipine 120mg daily.   9/19: BP improved. -125. Will wean off cardene drip. Continue home BP regimen. Had HD, removed 3L in 3 hours. Wean O2. Ophthalmology evaluated, evidence of hypertensive changes- flame hemorrhages and hard exudates, but no intervention needed at this time. Hepatology curbside recs to hold prednisone and cellcept, but continue prograf. Instructed pt to follow up with liver transplant clinic to optimize dosing of immunosuppressant medications.   9/20: Was scheduled to stepdown to floor yesterday, but no beds available. BP well-controlled on home BP regimen. Will get HD today and discharge home after HD.     Consults (From admission, onward)        Status Ordering Provider     Inpatient consult to Critical Care Medicine  Once     Provider:  (Not yet assigned)    Completed BLAKE CALLAHAN     Inpatient consult to Liver Transplant Surgery  Once     Provider:  (Not yet assigned)    Acknowledged SHAY HERNANDEZ     Inpatient consult to Nephrology  Once     Provider:  (Not yet assigned)    Completed BLAKE CALLAHAN     Inpatient consult to Ophthalmology  Once     Provider:  (Not yet assigned)    Completed SHAY HERNANDEZ        Review of Systems   Constitutional: Negative for chills and fever.   HENT: Negative for sore throat.    Eyes: Positive for visual disturbance.   Respiratory: Negative for cough and shortness of breath.    Cardiovascular: Negative for chest pain.   Gastrointestinal: Negative for abdominal pain, diarrhea, nausea and vomiting.    Genitourinary: Negative for dysuria.   Musculoskeletal: Negative for back pain.   Skin: Negative for rash and wound.   Neurological: Negative for dizziness, syncope, light-headedness and headaches.     Vitals:    09/20/18 1115 09/20/18 1130 09/20/18 1145 09/20/18 1200   BP:    (!) 139/91   BP Location:    Right arm   Patient Position:    Lying   Pulse: 84 80 81 81   Resp: (!) 24 (!) 27 (!) 32 (!) 28   Temp:       TempSrc:       SpO2: 100% 100% 100% 100%   Weight:       Height:         Physical Exam   Constitutional: She is oriented to person, place, and time. She appears well-developed and well-nourished. No distress.   HENT:   Head: Normocephalic and atraumatic.   Mouth/Throat: Oropharynx is clear and moist.   Edematous face   Neck: Neck supple.   Cardiovascular: Normal rate and regular rhythm.   AV fistula to LUE   Pulmonary/Chest: Effort normal and breath sounds normal. No stridor. No respiratory distress. She has no wheezes.   Abdominal: Soft. Bowel sounds are normal. She exhibits distension. There is no tenderness. There is no guarding.   Musculoskeletal: She exhibits no edema or deformity.   Neurological: She is alert and oriented to person, place, and time. She has normal strength. No cranial nerve deficit or sensory deficit.   Skin: Skin is warm and dry. She is not diaphoretic. No erythema. No pallor.   Psychiatric: She has a normal mood and affect. Her behavior is normal.   Nursing note and vitals reviewed.    Significant Labs:  Blood Culture:   Recent Labs   Lab  09/18/18   1750  09/18/18   1756   LABBLOO  No Growth to date  No Growth to date  No Growth to date  No Growth to date     BMP:   Recent Labs   Lab  09/20/18   0305   GLU  85   NA  140   K  3.7   CL  105   CO2  25   BUN  26*   CREATININE  5.8*   CALCIUM  7.8*   MG  1.9     CMP:   Recent Labs   Lab  09/19/18   0329  09/20/18   0305   NA  139  140   K  4.3  3.7   CL  105  105   CO2  21*  25   GLU  139*  85   BUN  53*  26*   CREATININE  9.9*   5.8*   CALCIUM  8.6*  7.8*   PROT  6.7  6.4   ALBUMIN  2.4*  2.3*   BILITOT  0.7  0.6   ALKPHOS  586*  545*   AST  45*  35   ALT  30  31   ANIONGAP  13  10   EGFRNONAA  4.8*  9.2*     Troponin:   Recent Labs   Lab  09/18/18   1456  09/18/18 2004   TROPONINI  0.420*  0.540*     Significant Imaging:  I have reviewed all pertinent imaging results/findings within the past 24 hours.    Pending Diagnostic Studies:     None        Final Active Diagnoses:    Diagnosis Date Noted POA    PRINCIPAL PROBLEM:  Hypertensive emergency [I16.1] 06/22/2018 Yes    Hypertensive retinopathy of both eyes, grade 3 [H35.033] 09/19/2018 Yes    Anemia of chronic kidney failure, stage 5 [N18.5, D63.1]  Yes    Immunosuppressed [D89.9] 08/05/2017 Yes     Chronic    Renovascular hypertension [I15.0] 10/02/2015 Yes     Chronic    ESRD on hemodialysis [N18.6, Z99.2] 09/30/2015 Not Applicable     Chronic   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 [Z94.4] 09/10/2012 Not Applicable     Chronic      Problems Resolved During this Admission:    Diagnosis Date Noted Date Resolved POA    Blurry vision, bilateral [H53.8] 09/19/2018 09/20/2018 Yes    Shortness of breath [R06.02] 09/18/2018 09/20/2018 Yes     Ophtho   Hypertensive retinopathy of both eyes, grade 3    - Blurry vision bilaterally but worse in L eye  - Improved throughout stay  - No current signs of infection, but concern due to immunosuppressed status and recent MSSA bacteremia, completed 2 week course of vancomycin.   - Ophthalmology consulted, they see evidence of hypertensive changes- flame hemorrhages and hard exudates, but no intervention needed at this time.  - Follow up in 1 week with ophthalmology clinic for OCT.        Cardiac/Vascular   * Hypertensive emergency    - Resumed home regimen of 0.3mg clonidine patch daily, labetalol 500mg bid, hydralazine 100mg q12, irbesartan 300mg daily, and nifedipine 120mg daily.  - Off cardene drip.  - MAP goal of 120-130. Well controlled now on home  regimen.  - CT head negative  - HD on 9/19 and 9/20.         Renovascular hypertension    - Currently on home regimen of 0.3mg clonidine patch daily, labetalol 500mg bid, hydralazine 100mg q12, irbesartan 300mg daily, and nifedipine 120mg daily.        Renal/   ESRD on hemodialysis    - BUN/Cr 26/5.8  - Doesn't make urine  - Plan for HD on 9/19 and 9/20.         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  - home regimen of Prograf, Cellcept, and Prednisone.   - prograf level 3.0  - hx of MSSA bacteremia in which pt recently completed 2 week course of Vancomycin, and was instructed to hold prograf, prednisone, and cellcept during those 2 weeks. Will obtain blood cultures due to immunosuppressed status; Bcx ngtd.   - sees Dr. Pinedo in liver transplant  - Hepatology curbside recs to hold prednisone and cellcept, but continue prograf. Instructed pt to follow up with liver transplant clinic after discharge to optimize dosing of immunosuppressant medications.           Discharged Condition: stable    Disposition:       Patient Instructions:   No discharge procedures on file.  Medications:  Reconciled Home Medications:      Medication List      CHANGE how you take these medications    cinacalcet 30 MG Tab  Commonly known as:  SENSIPAR  Take 1 tablet (30 mg total) by mouth daily with breakfast.  What changed:    · when to take this  · additional instructions     mycophenolate 250 mg Cap  Commonly known as:  CELLCEPT  Hold until see liver transplant clinic, per hepatology recs.  What changed:  additional instructions     ondansetron 4 MG tablet  Commonly known as:  ZOFRAN  Take 1 tablet (4 mg total) by mouth every 6 (six) hours as needed for Nausea.  What changed:  when to take this     predniSONE 1 MG  tablet  Commonly known as:  DELTASONE  Hold until see liver transplant clinic, per hepatology recs.  What changed:  additional instructions        CONTINUE taking these medications    aspirin 81 MG Chew  Take 1 tablet (81 mg total) by mouth once daily.     citalopram 20 MG tablet  Commonly known as:  CELEXA  TAKE 1 TABLET BY MOUTH EVERY DAY     clindamycin 2 % vaginal cream  Commonly known as:  CLINDESSE  Place 1 full applicator nightly     cloNIDine 0.3 mg/24 hr td ptwk 0.3 mg/24 hr  Commonly known as:  CATAPRES  Place 1 patch onto the skin every 7 days.     conjugated estrogens vaginal cream  Commonly known as:  PREMARIN  Regimen: 2g nightly for 1 week then 1g nightly for 1 week, then 0.5g M/W/F nights     famotidine 40 MG tablet  Commonly known as:  PEPCID  Take 0.5 tablets (20 mg total) by mouth once daily.     hydrALAZINE 100 MG tablet  Commonly known as:  APRESOLINE  Take 1 tablet (100 mg total) by mouth every 12 (twelve) hours.     irbesartan 300 MG tablet  Commonly known as:  AVAPRO  Take 1 tablet (300 mg total) by mouth once daily.     labetalol 100 MG tablet  Commonly known as:  NORMODYNE  Take 5 tablets (500 mg total) by mouth every 12 (twelve) hours.     NIFEdipine 60 MG (OSM) 24 hr tablet  Commonly known as:  PROCARDIA-XL  Take 2 tablets (120 mg total) by mouth once daily.     tacrolimus 1 MG Cap  Commonly known as:  PROGRAF  Take 6 capsules (6mg) in the morning and 6 capsules (6mg) in the evening     triamcinolone acetonide 0.1% 0.1 % ointment  Commonly known as:  KENALOG  AAA on arms, legs, and neck bid x 1-2 wks then prn flares only             Kennedy Loco MD  Critical Care Medicine  [facility]

## 2018-09-20 NOTE — PROGRESS NOTES
Dialysis tx completed. 3 hours. 2 liters removed. Needles pulled from left arm AVF. Hemostasis achieved. Gauze and tape to sites. Tolerated tx well. Report given to Davi PRUITT.

## 2018-09-20 NOTE — ASSESSMENT & PLAN NOTE
She dialyzes at [facility] under the care of Dr. Bass.  She dialyzes for 3.5 hours and reports an EDW of 50.5 kg.  She has an AVF to her LFA. She does not make any urine.    Plan/recommendations:  HD today for metabolic clearance/volume management  UF goal of 3L as tolerated.  Will attempt to challenge EDW.  Will discuss with OP unit about new post weight today.  Continue strict I/O's.  Renal diet    Hypertensive emergency  Known history of non-compliance with OP blood pressure regimen.   She remains on cardene gtt and she was started on her OP prescription  Could have a component of hypervolemia as she above her EDW.  Will attempt a UF goal of 3L and continue trending BP    BMM  Renal diet  Phos levels daily  Continue cinacalcet    Anemia of ESRD  In setting of uncontrolled blood pressure, will hold MARIA T

## 2018-09-21 NOTE — PATIENT INSTRUCTIONS
"  Discharge Instructions for High Blood Pressure (Hypertension)  You have been diagnosed with high blood pressure (also called hypertension). This means the force of blood against your artery walls is too strong. It also means your heart is working hard to move blood. High blood pressure usually has no symptoms, but over time, it can damage your heart, blood vessels, eyes, kidneys, and other organs. With help from your doctor, you can manage your blood pressure and protect your health.  Taking medicine  · Learn to take your own blood pressure. Keep a record of your results. Ask your doctor which readings mean that you need medical attention.  · Take your blood pressure medicine exactly as directed. Dont skip doses. Missing doses can cause your blood pressure to get out of control.  · If you do miss a dose (or doses) check with your healthcare provider about what to do.  · Avoid medicine that contain heart stimulants, including over-the-counter drugs. Check for warnings about high blood pressure on the label. Ask the pharmacist before purchasing something you haven't used before  · Check with your doctor or pharmacist before taking a decongestant. Some decongestants can worsen high blood pressure.  Lifestyle changes  · Maintain a healthy weight. Get help to lose any extra pounds.  · Cut back on salt.  ¨ Limit canned, dried, packaged, and fast foods.  ¨ Dont add salt to your food at the table.  ¨ Season foods with herbs instead of salt when you cook.  ¨ Request no added salt when you go to a restaurant.  ¨ The American Heart Associations (AHA) "ideal" sodium intake recommendation is 1,500 milligrams per day.  However, since American's eat so much salt, the AHA says a positive change can occur by cutting back to even 2,400 milligrams of sodium a day.   · Follow the DASH (Dietary Approaches to Stop Hypertension) eating plan. This plan recommends vegetables, fruits, whole gains, and other heart healthy foods.  · Begin " an exercise program. Ask your doctor how to get started. The American Heart Association recommends aerobic exercise 3 to 4 times a week for an average of 40 minutes at a time, with your doctor's approval. Simple activities like walking or gardening can help.  · Break the smoking habit. Enroll in a stop-smoking program to improve your chances of success. Ask your healthcare provider about programs and medicines to help you stop smoking.  · Limit drinks that contain caffeine (coffee, black or green tea, cola) to 2 per day.  · Never take stimulants such as amphetamines or cocaine; these drugs can be deadly for someone with high blood pressure.  · Control your stress. Learn stress-management techniques.  · Limit alcohol to no more than 1 drink a day for women and 2 drinks a day for men.  Follow-up care  Make a follow-up appointment as directed by our staff.     When to seek medical care  Call your doctor immediately or seek emergency care if you have any of the following:  · Chest pain or shortness of breath (call 911)  · Moderate to severe headache  · Weakness in the muscles of your face, arms, or legs  · Trouble speaking  · Extreme drowsiness  · Confusion  · Fainting or dizziness  · Pulsating or rushing sound in your ears  · Unexplained nosebleed  · Weakness, tingling, or numbness of your face, arms, or legs  · Change in vision  · Blood pressure measured at home that is greater than 180/110   Date Last Reviewed: 4/27/2016  © 4686-9085 [employer]. [address] [place] [address], [place] [address]. All rights reserved. This information is not intended as a substitute for professional medical care. Always follow your healthcare professional's instructions.

## 2018-09-21 NOTE — PROGRESS NOTES
C3 nurse attempted to contact patient. The following occurred:   C3 nurse attempted to contact Holly Patel for a TCC post hospital discharge follow up call. The patient is unable to conduct the call @ this time. The patient requested a callback.    The patient has a scheduled EP appointment with Jennifer Ireland on 9\24 @ 0900. Message sent to Physician staff.

## 2018-09-24 PROBLEM — B95.61 MSSA BACTEREMIA: Status: RESOLVED | Noted: 2018-05-28 | Resolved: 2018-01-01

## 2018-09-24 PROBLEM — R78.81 GRAM-POSITIVE BACTEREMIA: Status: RESOLVED | Noted: 2018-09-01 | Resolved: 2018-01-01

## 2018-09-24 PROBLEM — R78.81 MSSA BACTEREMIA: Status: RESOLVED | Noted: 2018-05-28 | Resolved: 2018-01-01

## 2018-09-24 PROBLEM — J18.9 PNEUMONIA OF RIGHT LOWER LOBE DUE TO INFECTIOUS ORGANISM: Status: RESOLVED | Noted: 2018-08-31 | Resolved: 2018-01-01

## 2018-09-24 PROBLEM — R79.89 ELEVATED TROPONIN: Status: RESOLVED | Noted: 2018-02-16 | Resolved: 2018-01-01

## 2018-09-24 NOTE — TELEPHONE ENCOUNTER
Called pt to discuss lab results. Advised that LFTs elevated and that she needs repeat labs drawn. Pt states she can have them done Wednesday when she is here for pre-kidney eval.

## 2018-09-24 NOTE — PROGRESS NOTES
"Subjective:       Patient ID: Holly Paetl is a 28 y.o. female.    Chief Complaint: Hospital Follow-up and Headache    HPI   Pt of Dr Sosa.    Hospitalized Sept 18 with hypertensive urgency associated with forgetting yo re-apply Clonidine patch.   Presents today for follow up.  D/C summary reviewed.  Had blurry vision with hypertensive changes on retina exam.     To f/u with ophtho.  BP meds weren't changed.  Bp typically runs 170-180 according to d/c summary.  she has ESRD and has  HD  3 days a week.   She is being evaluated for renal transplant. She has had liver transplant.    Was also seen in ED this am with headache.      Note not complete.  She was given fiorinal, and pain about "50% better" now.       Family believes today's BP is similar to home readings.  She has recently taken meds as directed, but non -compliance noted in recent past..              Review of Systems   Constitutional: Positive for fatigue. Negative for fever and unexpected weight change.   HENT: Negative for congestion and postnasal drip.    Eyes: Negative for pain, discharge and visual disturbance.   Respiratory: Negative for cough, chest tightness, shortness of breath and wheezing.    Cardiovascular: Negative for chest pain and leg swelling.   Gastrointestinal: Negative for abdominal pain, constipation, diarrhea and nausea.   Genitourinary: Negative for difficulty urinating, dysuria and hematuria.   Skin: Negative for rash.   Neurological: Positive for headaches.   Psychiatric/Behavioral: Negative for dysphoric mood and sleep disturbance. The patient is not nervous/anxious.        Objective:      Physical Exam   Constitutional: She is oriented to person, place, and time. She appears well-developed and well-nourished.   Eyes: No scleral icterus.   Neck: No JVD present. No thyromegaly present.   Cardiovascular: Normal rate, regular rhythm and normal heart sounds.   Pulmonary/Chest: Effort normal and breath sounds normal. No " respiratory distress. She has no wheezes. She has no rales.   Abdominal: Soft. She exhibits distension. She exhibits no mass. There is no tenderness.   Musculoskeletal: She exhibits no edema.   Neurological: She is alert and oriented to person, place, and time. No cranial nerve deficit. Coordination normal.   Psychiatric: She has a normal mood and affect. Her behavior is normal.       Assessment:       1. Hypertension, unspecified type - recent hospitalization for hypertensive urgency, due to not applying clonidine patch   2. ESRD on hemodialysis    3. Chronic fatigue    4. Hypertensive retinopathy of both eyes, grade 3    5. Liver replaced by transplant     6.    Acute headache, evaluated in ED earlier today, improved.   Plan:       Holly was seen today for follow-up and headache.    Diagnoses and all orders for this visit:    Hypertension, unspecified type    ESRD on hemodialysis    Chronic fatigue    Hypertensive retinopathy of both eyes, grade 3    Liver replaced by transplant       Compliance stressed.     F/U Dr Sosa    Transitional Care Note    Family and/or Caretaker present at visit?  Yes.  Mother.  Diagnostic tests reviewed/disposition: No diagnosic tests pending after this hospitalization.  Disease/illness education: yes  Home health/community services discussion/referrals: Patient does not have home health established from hospital visit.  They do not need home health.  If needed, we will set up home health for the patient.   Establishment or re-establishment of referral orders for community resources: No other necessary community resources.   Discussion with other health care providers: No discussion with other health care providers necessary.

## 2018-09-24 NOTE — ED NOTES
Pt presents with c/o pain to L, temporal reigon and behind L, eye x2 days; denies N/V or change in vision; reports use of OTC meds not helping to control symptoms; pain rated at 10/10 on pain scale; pain described as continuous, throbbing type pain; non-radiating; denies any alleviating factors; (+) sensitivity to light and noise; no resp distress; resp E/U; pt on RA; NADN: will continue to monitor

## 2018-09-24 NOTE — TELEPHONE ENCOUNTER
----- Message from Lei Pinedo MD sent at 9/21/2018  4:45 PM CDT -----  Results reviewed  Repeat Monday please

## 2018-09-24 NOTE — ED PROVIDER NOTES
Encounter Date: 9/23/2018    SCRIBE #1 NOTE: I, Kecia Ybarra, am scribing for, and in the presence of,  Dr. Brady. I have scribed the following portions of the note - Other sections scribed: HPIYo PALMER PE.       History     Chief Complaint   Patient presents with    Headache     pt presents with c/o headache x2 days; Hx of migraines; (+) sensitivity to light and noise; denies N/V or change in vision; pt states past experience with similar symptoms     28 y.o female complains of a HA that started yesterday morning. Patient is a currently on dialysis and a liver transplant patient.       The history is provided by the patient.   Headache    This is a new problem. The current episode started yesterday. The problem has been unchanged. The pain is located in the left unilateral region. The pain does not radiate. The pain quality is similar to prior headaches. Associated symptoms include phonophobia and photophobia. Pertinent negatives include no back pain, fever, nausea, sore throat, vomiting or weakness. Her past medical history is significant for hypertension, immunosuppression and migraine headaches. There is no history of obesity.     Review of patient's allergies indicates:   Allergen Reactions    Chloral hydrate      Other reaction(s): Hallucinations  Other reaction(s): Hives    Hydrocodone Other (See Comments)     Mental status changes     Past Medical History:   Diagnosis Date    Anemia in ESRD (end-stage renal disease) 10/12/2015    dialysis tues, thursday, sat; access left arm    Chronic rejection of liver transplant 3/22/2016    Depression     Encounter for blood transfusion     ESRD on hemodialysis 9/30/2015    History of recent hospitalization 05/2018    pneumonia    History of splenomegaly 4/12/2016    Immunosuppressed 8/5/2017    Iron deficiency anemia secondary to inadequate dietary iron intake 8/16/2017    She [facility].    Liver replaced by  transplant 9/10/2012    hemangioendothelioma s/p LTx ()    Moderate protein-calorie malnutrition 2017    MRSA bacteremia 2017    Pneumonia     Prophylactic immunotherapy 2014    Renovascular hypertension 10/2/2015    Secondary hyperparathyroidism 2017    Seizures     Sialadenitis 3/21/2018    Thrombocytopenia 2016     Past Surgical History:   Procedure Laterality Date    BIOPSY-LIVER N/A 2017    Performed by [facility] at [facility] OR 2ND FLR    BIOPSY-LIVER N/A 3/22/2016    Performed by [facility] at [facility] OR 2ND FLR     SECTION      x 2    CONIZATION OF CERVIX USING LOOP ELECTROSURGICAL EXCISION PROCEDURE (LEEP) N/A 6/15/2018    Procedure: CONIZATION-CERVICAL-LEEP;  Surgeon: Neelam Marroquin MD;  Location: [facility] OR;  Service: OB/GYN;  Laterality: N/A;    CONIZATION-CERVICAL-LEEP N/A 6/15/2018    Performed by [facility]am Mar[facility]quin MD at [facility] OR    [id number]; upper extremity Left 2015    Performed by Idalia Diaz MD at [facility] OR 2ND FLR    EMBOLIZATION N/A 2018    Procedure: EMBOLIZATION, BLOOD VESSEL;  Surgeon: Aren Ramos MD;  Location: [facility] CATH LAB;  Service: Radiology;  Laterality: N/A;    EMBOLIZATION, BLOOD VESSEL N/A 2018    Performed by Aren Ramos MD at [facility] CATH LAB    Exam Under Anesthesia N/A 2018    Performed by [facility] at [facility] OR 2ND FLR    Exam under anesthesia N/A 2018    Performed by [facility]am [facility]quin MD at [facility] OR    Exam under anesthesia (ADD ON ) N/A 2018    Performed by NICKIE Alvarez MD at [facility] OR    Exam under anesthesia -cervical suturing  N/A 2018    Performed by Lei Sims III, MD at [facility] OR    EXAMINATION UNDER ANESTHESIA N/A 2018    Procedure: Exam under anesthesia;  Surgeon: Neelam Marroquin MD;  Location: [facility] OR;  Service: OB/GYN;  Laterality: N/A;    EXAMINATION UNDER ANESTHESIA N/A 2018     Procedure: Exam under anesthesia (ADD ON );  Surgeon: NICKIE Alvarez MD;  Location: [facility];  Service: OB/GYN;  Laterality: N/A;  (ADD ON )    EXAMINATION UNDER ANESTHESIA N/A 7/26/2018    Procedure: Exam under anesthesia -cervical suturing ;  Surgeon: Lei Sims III, MD;  Location: [facility];  Service: OB/GYN;  Laterality: N/A;    EXAMINATION UNDER ANESTHESIA N/A 8/8/2018    Procedure: Exam Under Anesthesia;  Surgeon: Neelam Marroquin MD;  Location: [facility] OR 2ND FLR;  Service: OB/GYN;  Laterality: N/A;  need endoloop  request 12 noon    FISTULOGRAM Left 12/4/2015    Performed by Idalia Diaz MD at [facility] CATH LAB    LIVER BIOPSY      LIVER TRANSPLANT  09/1992    PERINEORRHAPHY  6/19/2018    Procedure: SUTURE REPAIR,CERVIX;  Surgeon: Neelam Marroquin MD;  Location: [facility];  Service: OB/GYN;;    SUTURE REPAIR,CERVIX  6/19/2018    Performed by [facility] at [facility] OR    TUBAL LIGATION  2010     Family History   Problem Relation Age of Onset    Hypertension Mother     Hypertension Father     Melanoma Neg Hx     Breast cancer Neg Hx     Colon cancer Neg Hx     Ovarian cancer Neg Hx      Social History     Tobacco Use    Smoking status: Never Smoker    Smokeless tobacco: Never Used   Substance Use Topics    Alcohol use: No    Drug use: No     Review of Systems   Constitutional: Negative for fever.   HENT: Negative for sore throat.    Eyes: Positive for photophobia. Negative for visual disturbance.   Respiratory: Negative for shortness of breath.    Cardiovascular: Negative for chest pain.   Gastrointestinal: Negative for nausea and vomiting.   Genitourinary: Negative for dysuria.   Musculoskeletal: Negative for back pain.   Skin: Negative for rash.   Neurological: Positive for headaches. Negative for weakness.   Hematological: Does not bruise/bleed easily.   All other systems reviewed and are negative.      Physical Exam     Initial Vitals [09/23/18 2347]   BP Pulse Resp  Temp SpO2   (!) 203/129 78 20 97.9 °F (36.6 °C) 100 %      MAP       --         Physical Exam    Nursing note and vitals reviewed.  Constitutional: She appears well-developed and well-nourished. She is not diaphoretic. No distress.   HENT:   Head: Normocephalic and atraumatic.   Right Ear: External ear normal.   Left Ear: External ear normal.   Eyes: Conjunctivae and EOM are normal. Pupils are equal, round, and reactive to light.   Neck: Normal range of motion.   Cardiovascular: Normal rate, regular rhythm and normal heart sounds. Exam reveals no gallop and no friction rub.    No murmur heard.  Pulmonary/Chest: Breath sounds normal. No respiratory distress. She has no wheezes. She has no rhonchi. She has no rales.   Abdominal: Soft. Bowel sounds are normal.   Musculoskeletal: Normal range of motion.   Neurological: She is alert and oriented to person, place, and time. No cranial nerve deficit or sensory deficit.   Skin: Skin is warm and dry.   Psychiatric: She has a normal mood and affect. Thought content normal.         ED Course   Procedures  Labs Reviewed - No data to display       Imaging Results    None          Medical Decision Making:   Initial Assessment:   28 y.o female complains of a HA that started yesterday morning. Patient is a currently on dialysis and a liver transplant patient.       The history is provided by the patient.   Headache    This is a new problem. The current episode started yesterday. The problem has been unchanged. The pain is located in the left unilateral region. The pain does not radiate. The pain quality is similar to prior headaches. Associated symptoms include phonophobia and photophobia. Pertinent negatives include no back pain, fever, nausea, sore throat, vomiting or weakness. Her past medical history is significant for hypertension, immunosuppression and migraine headaches. There is no history of obesity.     ED Management:  Patient was given a dose of clonidine and Fioricet in  the emergency department.  She states headache has improved and blood pressure as well. Instructions for hypertension and headache were given and patient received prescriptions for Fioricet.  She was advised to follow up with primary care physician within the next 2 days for re-evaluation and to return to the emergency department if condition worsens.            Scribe Attestation:   Scribe #1: I performed the above scribed service and the documentation accurately describes the services I performed. I attest to the accuracy of the note.            This document was produced by a scribe under my direction and in my presence. I agree with the content of the note and have made any necessary edits.     Dr. Brady    10/14/2018 3:43 AM       Clinical Impression:     1. Acute nonintractable headache, unspecified headache type    2. Hypertension      Disposition:   Disposition: Discharged  Condition: Stable                        Pedrito Brady MD  10/14/18 0347

## 2018-09-26 PROBLEM — R79.89 ELEVATED PTHRP LEVEL: Status: ACTIVE | Noted: 2018-01-01

## 2018-09-26 NOTE — PROGRESS NOTES
Ms. Patel received initial Hep A, Flu HD, and Pneumovax 23 vaccines and tolerated them well. She left the unit in NAD.

## 2018-09-26 NOTE — PROGRESS NOTES
MYCOPHENOLATE REMS PROGRAM:      Pt states she has had surgery for permanent contraception.  An acknowledgement form was not needed for this patient.

## 2018-09-26 NOTE — LETTER
September 28, 2018        Enrrique Moise  [address]  [place] [address]  Phone: [phone]  Fax: [phone]             [facility]  [address]  [facility] [address]  Phone: [phone]   Patient: Holly Patel   MR Number: [id number]   YOB: 1990   Date of Visit: 9/26/2018       Dear Dr. Enrrique Moise    Thank you for referring Holly Patel to me for evaluation. Attached you will find relevant portions of my assessment and plan of care.    If you have questions, please do not hesitate to call me. I look forward to following Holly Patel along with you.    Sincerely,    Fidelia Naqvi, NP    Enclosure    If you would like to receive this communication electronically, please contact [email or web address] or [phone] to request [employer] Link access.    [employer] Link is a tool which provides read-only access to select patient information with whom you have a relationship. Its easy to use and provides real time access to review your patients record including encounter summaries, notes, results, and demographic information.    If you feel you have received this communication in error or would no longer like to receive these types of communications, please e-mail [email or web address]

## 2018-09-26 NOTE — PROGRESS NOTES
PHARM.D. PRE-TRANSPLANT NOTE:    This patient's medication therapy was evaluated as part of her pre-transplant evaluation.    The following pharmacologic concerns were noted: no    This patient's medication profile was reviewed for contraindications for DAA Hepatitis C therapy:    [X]  No current inducers of CYP 3A4 or PGP  [X]  No amiodarone on this patient's EMR profile in the last 24 months  [X]  No past or current atrial fibrillation on this patient's EMR profile       Current Outpatient Medications   Medication Sig Dispense Refill    aspirin 81 MG Chew Take 1 tablet (81 mg total) by mouth once daily.  0    butalbital-acetaminophen-caffeine -40 mg (FIORICET, ESGIC) -40 mg per tablet Take 2 tablets by mouth every 8 (eight) hours as needed for Pain. 30 tablet 1    cinacalcet (SENSIPAR) 30 MG Tab Take 1 tablet (30 mg total) by mouth daily with breakfast. 30 tablet 2    citalopram (CELEXA) 20 MG tablet TAKE 1 TABLET BY MOUTH EVERY DAY 30 tablet 1    clindamycin (CLINDESSE) 2 % vaginal cream Place 1 full applicator nightly 40 g 3    cloNIDine 0.3 mg/24 hr td ptwk (CATAPRES) 0.3 mg/24 hr Place 1 patch onto the skin every 7 days. 4 patch 11    famotidine (PEPCID) 40 MG tablet Take 0.5 tablets (20 mg total) by mouth once daily. 15 tablet 11    hydrALAZINE (APRESOLINE) 100 MG tablet Take 1 tablet (100 mg total) by mouth every 12 (twelve) hours. 60 tablet 11    irbesartan (AVAPRO) 300 MG tablet Take 1 tablet (300 mg total) by mouth once daily. 90 tablet 3    labetalol (NORMODYNE) 100 MG tablet Take 5 tablets (500 mg total) by mouth every 12 (twelve) hours. 300 tablet 11    mycophenolate (CELLCEPT) 250 mg Cap Hold until see liver transplant clinic, per hepatology recs. 240 capsule 0    NIFEdipine (PROCARDIA-XL) 60 MG (OSM) 24 hr tablet Take 2 tablets (120 mg total) by mouth once daily. 60 tablet 11    ondansetron (ZOFRAN) 4 MG tablet Take 1 tablet (4 mg total) by mouth every 6 (six) hours as needed  for Nausea. (Patient taking differently: Take 4 mg by mouth once daily. ) 15 tablet 0    predniSONE (DELTASONE) 1 MG tablet Hold until see liver transplant clinic, per hepatology recs. 30 tablet 1    tacrolimus (PROGRAF) 1 MG Cap Take 6 capsules (6mg) in the morning and 6 capsules (6mg) in the evening 450 capsule 0    triamcinolone acetonide 0.1% (KENALOG) 0.1 % ointment AAA on arms, legs, and neck bid x 1-2 wks then prn flares only 80 g 3     No current facility-administered medications for this visit.          Currently she is responsible for preparing / administering this patient's medications on a daily basis.  I am available for consultation and can be contacted, as needed by the other members of the Kidney Transplant team.

## 2018-09-26 NOTE — PROGRESS NOTES
Transplant Surgery  Kidney Transplant Recipient Evaluation    Referring Physician: Lei Pinedo  Current Nephrologist:     Subjective:     Reason for Visit: evaluate transplant candidacy    History of Present Illness: Holly Patel is a 28 y.o. year old female undergoing transplant evaluation.    Dialysis History: Holly is on hemodialysis.      Transplant History: 11/8/1992 (Liver)    Etiology of Renal Disease:  (based on medical records from referral).    Review of Systems   Constitutional: Positive for fatigue.   HENT: Negative for drooling, postnasal drip and sore throat.    Eyes: Negative for discharge and itching.   Respiratory: Negative for choking and stridor.    Gastrointestinal: Negative for rectal pain.   Endocrine: Negative for polydipsia.   Genitourinary: Negative for enuresis and genital sores.   Musculoskeletal: Negative for back pain, neck pain and neck stiffness.   Allergic/Immunologic: Negative for immunocompromised state.   Neurological: Negative for facial asymmetry and numbness.   Hematological: Negative for adenopathy.   Psychiatric/Behavioral: Negative for behavioral problems, self-injury and suicidal ideas.       Objective:     Physical Exam:  Constitutional:   Vitals reviewed: yes   Well-nourished and well-groomed: yes  Eyes:   Sclerae icteric: no   Extraocular movements intact: yes  GI:    Bowel sounds normal: yes   Tenderness: no    If yes, quadrant/location: not applicable   Palpable masses: no    If yes, quadrant/location: not applicable   Hepatosplenomegaly: no   Ascites: no   Hernia: no    If yes, type/location: not applicable   Surgical scars: yes    If yes, type/location: Mercedes incision  Resp:   Effort normal: yes   Breath sounds normal: yes    CV:   Regular rate and rhythm: yes   Heart sounds normal: yes   Femoral pulses normal: yes   Extremities edematous: no  Skin:   Rashes or lesions present: no    If yes, describe:not applicable   Jaundice::  no    Musculoskeletal:   Gait normal: yes   Strength normal: yes  Psych:   Oriented to person, place, and time: yes   Affect and mood normal: yes    Additional comments: not applicable    Counseling: We provided Holly Patel with a group education session today.  We discussed kidney transplantation at length with her, including risks, potential complications, and alternatives in the management of her renal failure.  The discussion included complications related to anesthesia, bleeding, infection, primary nonfunction, and ATN.  I discussed the typical postoperative course, length of hospitalization, the need for long-term immunosuppression, and the need for long-term routine follow-up.  I discussed living-donor and -donor transplantation and the relative advantages and disadvantages of each.  I also discussed average waiting times for both living donation and  donation.  I discussed national and center-specific survival rates.  I also mentioned the potential benefit of multicenter listing to candidates listed with centers within more than one organ procurement organization.  All questions were answered.    Final determination of transplant candidacy will be made once evaluation is complete and reviewed by the Kidney & Kidney/Pancreas Selection Committee.         Transplant Surgery - Candidacy   Assessment/Plan:   Holly Patel has end stage renal disease (ESRD) on dialysis. I see no surgical contraindication to placing a kidney transplant. Based on available information, Holly Patel is a suitable kidney transplant candidate.     David Hernandez MD

## 2018-09-26 NOTE — PROGRESS NOTES
CLINIC TEACHING NOTE    Holly Patel was seen in transplant clinic today.  Coordinator verified that the patient viewed the teaching video and received written educational material.    The following information was reviewed:  · Waiting list time for cadaveric vs. living donation  · Waiting list process including: back-up calls, notification of changes in contact information, dialysis/physician information to the transplant coordinator, notifications of changes in health or if hospitalized, and notification of travel out of the area  · Monthly antibody testing to be obtained by the dialysis unit or arranged through a local lab and mailed to the transplant center.  Patient informed that if blood is not sent monthly and a kidney becomes available, the patient will need to come to the hospital to provide a fresh sample of blood for testing.    Patient was instructed that once on the waiting list, an appointment with the transplant physician will be scheduled yearly or every 6 months to ensure patient remains an acceptable transplant candidate.    Patient also informed that at the time of transplant, participation in a research protocol may be offered.  Notified that this is strictly voluntary and will not affect the quality of care received.      The option to have name placed on multiple transplant lists to increase opportunity for transplant was reviewed.    All patient questions were answered.  Patient verbalized understanding of above information.    Patient presents as a suitable candidate for transplant.

## 2018-09-26 NOTE — TELEPHONE ENCOUNTER
Reviewed pt transplant labs.  Notified dialysis unit dietitian of the following abnormal labs via fax.     Albumin       3.0g/dl      Fidelia Mock MS RD LDN

## 2018-09-26 NOTE — PROGRESS NOTES
Pre Transplant Infectious Diseases Consult  Kidney and Pancreas Transplant Recipient Evaluation    Requesting Physician: Lei Pinedo      Reason for Visit:    Chief Complaint   Patient presents with    Kidney Transplant Pre-evaluation     History of Present Illness  Holly Patel is a 28 y.o. year old Black or  female with advanced Kidney and Pancreas disease currently being evaluated for Kidney and Pancreas transplant. Patient is currently on HD via LUE AVF. Tolerating dialysis well. Denies dialysis related infections.     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 She is currently on prednisone and prograf for maintenance immunosuppression.  She has a history of MRSA bacteremia of unknown etiology in 8/2017 with TTE suggestive of MV endocarditis (filament on MV; however on atypical side of heart for infection, and did not grow MRSA from blood cultures pror to receipt of IV abx - inconsistent with diagnosis). She completed 6 weeks of Vancomycin at HD. Patient also has a history of MSSA bacteremia earlier this month requiring two weeks of IV antibiotics. Source of bacteremia unclear? Thought to be related to dialysis. She completed antibiotics on 9/16. She currently feels well without chills, fevers or night sweats. Denies other recent infective illnesses.      1) Do you have a history of:   YES NO   Diabetes      [] [x]     Diabetic Foot Infection/Bone Infection  []        [x]     Surgical Removal of Spleen   []  [x]       2) Have you had recurrent infections involving:             YES NO  Sinus infections  []         [x]   Sore Throat   []         [x]                 Prostate Infections  []         [x]              Bladder Infections  []         [x]                     Kidney Infections  []         [x]                               Intestinal Infections  []         [x]      Skin Infections   []         [x]       Reproductive  Infections          []  [x]   Periodontal Disease  []         [x]        3)Have you ever had: YES     NO UNKNOWN      Chicken Pox   [x]         []  []   Shingles   [x]         []  []  Back  Orolabial Herpes             []  [x]  []   Genital Herpes  []         [x]  []   Cytomegalovirus  []         [x]  []   Campos-Barr Virus  [x]         []  []   Genital Warts   []         [x]  []   Hepatitis A   []         [x]  []   Hepatitis B   []         [x]  []   Hepatitis C   []         [x]  []   Syphilis   []         [x]  []   Gonorrhea   []         [x]  []   Pelvic Inflammatory   Disease   []         [x]  []   Chlamydia Infection  []         [x]  []   Intestinal parasites    or worms   []         [x]  []   Fungal Infections  []         [x]  []   Blood Infections  [x]         []  []       Comment:       4) Have you ever been exposed   YES NO  To someone with tuberculosis?  []   [x]   If yes, what treatment did you receive:     5) What states have you lived in? [place]    6) What countries have you visited for more than 2 weeks? N/A                        YES NO  7) Did you have any associated infections?  []  [x]       8) Are you planning to travel outside the    []  [x]   [place] after your transplant?    9) Household                   YES NO  Do you have pets living in your house?    []         [x]   If yes, describe:      Do you spend time or live on a farm or     []         [x]   have livestock or other farm animals?  If yes, which ones:    Do you have a fish tank?          []  [x]       Do you have a litter box?      []         [x]     Do you fish or hunt?      []         [x]     Do you clean or skin fish or animals?    []         [x]     Do you consume raw or undercooked    []         [x]   meat, fish, or shellfish?      10) What occupations have you had? Not currently working. Previously was  at [employer]     11) Patient reports hobby to be Cards, indoor activities           12)Do you garden or  otherwise  YES NO   work in the soil?    []         [x]   13)Do you hike, camp, or spend     time in wooded areas?   []         [x]        14) The patient's immunization history was reviewed.    Have you ever received:  YES NO UNKNOWN DATES   Routine Childhood vaccines  [x]         []  []      Influenza vaccine   [x]  []  [] 10/2017   Pneumovax    [x]  []  []  Prevnar 13 10/2017    Tetanus-diptheria   []         []  [x]    Hepatitis A vaccine series       []  []  [x]    Hepatitis B vaccine series         [x]  []  []  12/2015 - 1/2016   Meningitis vaccine   []         []  [x]     Varicella vaccine   [x]         []  []        Based on the patients immunization history and serologies, immunizations were ordered:         Ordered  Not Ordered  Influenza Vaccine     [x]    []   Hepatitis A series at 0,  6 months   [x]    []   Hepatitis B seriesat 0, 1, and 6 months  []    [x]   Hepatitis B High Dose 0,1, and 6 months  []    [x]   Prevnar      []    [x]   Pneumovax      [x]    []    TDap       [x]    []    Zoster       [x]    []   Menactra      []    []            The patient was encouraged to contact us about any problems that may develop after immunization and possible side effects were reviewed.      Previous Transplant: yes; organ: liver, date: 1992, complications: N/A.    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 ()    Moderate protein-calorie malnutrition 2017    MRSA bacteremia 2017    Pneumonia     Prophylactic immunotherapy 2014    Renovascular hypertension 10/2/2015    Secondary hyperparathyroidism 2017    Seizures     Sialadenitis 3/21/2018    Thrombocytopenia 2016     Past Surgical History:   Procedure Laterality Date    BIOPSY-LIVER N/A 2017    Performed by [facility] OR 2ND FLR    BIOPSY-LIVER N/A 3/22/2016    Performed by [facility] OR 2ND FLR     SECTION      x 2    CONIZATION OF CERVIX USING LOOP ELECTROSURGICAL EXCISION PROCEDURE (LEEP) N/A 6/15/2018    Procedure: CONIZATION-CERVICAL-LEEP;  Surgeon: Neelam Marroquin MD;  Location: [facility] OR;  Service: OB/GYN;  Laterality: N/A;    CONIZATION-CERVICAL-LEEP N/A 6/15/2018    Performed by Neelam Marroquin MD at [facility] OR    [id number]; upper extremity Left 2015    Performed by Idalia Diaz MD at [facility] OR 2ND FLR    EMBOLIZATION N/A 2018    Procedure: EMBOLIZATION, BLOOD VESSEL;  Surgeon: Aren Ramos MD;  Location: [facility] CATH LAB;  Service: Radiology;  Laterality: N/A;    EMBOLIZATION, BLOOD VESSEL N/A 2018    Performed by Aren Ramos MD at [facility] CATH LAB    Exam Under Anesthesia N/A 2018    Performed by [facility]quin [facility] at [facility] OR 2ND FLR    Exam under anesthesia N/A 2018    Performed by [facility]am Marroquin MD at [facility] OR    Exam under anesthesia (ADD ON ) N/A 2018    Performed by NICKIE Alvarez MD at [facility] OR    Exam under anesthesia -cervical suturing  N/A 2018    Performed by Lei Sims III, MD at [facility] OR    EXAMINATION UNDER  ANESTHESIA N/A 6/19/2018    Procedure: Exam under anesthesia;  Surgeon: Neelam Marroquin MD;  Location: [facility] [place] OR;  Service: OB/GYN;  Laterality: N/A;    EXAMINATION UNDER ANESTHESIA N/A 7/9/2018    Procedure: Exam under anesthesia (ADD ON );  Surgeon: NICKIE Alvarez MD;  Location: [facility] [place] OR;  Service: OB/GYN;  Laterality: N/A;  (ADD ON )    EXAMINATION UNDER ANESTHESIA N/A 7/26/2018    Procedure: Exam under anesthesia -cervical suturing ;  Surgeon: Lei Sims III, MD;  Location: [facility] [place] OR;  Service: OB/GYN;  Laterality: N/A;    EXAMINATION UNDER ANESTHESIA N/A 8/8/2018    Procedure: Exam Under Anesthesia;  Surgeon: Neelam Marroquin MD;  [facility];  Service: OB/GYN;  Laterality: N/A;  need endoloop  request 12 noon    FISTULOGRAM Left 12/4/2015    Performed by Idalia Diaz [facility] at [facility] CATH LAB    LIVER BIOPSY      LIVER TRANSPLANT  09/1992    PERINEORRHAPHY  6/19/2018    Procedure: SUTURE REPAIR,CERVIX;  Surgeon: Neelam Marroquin MD;  Location: [facility];  Service: OB/GYN;;    SUTURE REPAIR,CERVIX  6/19/2018    Performed by Neelam Marroquin MD at [facility] [place] OR    TUBAL LIGATION  2010      Social History     Socioeconomic History    Marital status: Legally      Spouse name: Not on file    Number of children: Not on file    Years of education: Not on file    Highest education level: Not on file   Social Needs    Financial resource strain: Not on file    Food insecurity - worry: Not on file    Food insecurity - inability: Not on file    Transportation needs - medical: Not on file    Transportation needs - non-medical: Not on file   Occupational History    Not on file   Tobacco Use    Smoking status: Never Smoker    Smokeless tobacco: Never Used   Substance and Sexual Activity    Alcohol use: No    Drug use: No    Sexual activity: No     Partners: Male   Other Topics Concern    Are you pregnant or think you may be? No    Breast-feeding No   Social History  Narrative    Lives 2 kids, 7 and 8, and nephew. Her mom helps with kids.       Review of Systems   Constitution: Positive for weight loss. Negative for chills, decreased appetite, fever, weakness, malaise/fatigue and night sweats.   HENT: Negative for congestion, ear pain, hearing loss, hoarse voice, sore throat and tinnitus.    Eyes: Positive for blurred vision and visual disturbance. Negative for redness.   Cardiovascular: Negative for chest pain, leg swelling and palpitations.   Respiratory: Negative for cough, hemoptysis, shortness of breath, sputum production and wheezing.    Endocrine: Negative for cold intolerance and heat intolerance.   Hematologic/Lymphatic: Negative for adenopathy. Does not bruise/bleed easily.   Skin: Negative for dry skin, itching, rash and suspicious lesions.   Musculoskeletal: Negative for back pain, joint pain, myalgias and neck pain.   Gastrointestinal: Positive for bloating. Negative for abdominal pain, constipation, diarrhea, heartburn, nausea and vomiting.   Genitourinary: Negative for dysuria, flank pain, frequency, hematuria, hesitancy and urgency.   Neurological: Positive for headaches. Negative for dizziness, numbness and paresthesias.   Psychiatric/Behavioral: Negative for depression and memory loss. The patient does not have insomnia and is not nervous/anxious.    Allergic/Immunologic: Negative for environmental allergies, HIV exposure, hives and persistent infections.     Physical Exam   Constitutional: She is oriented to person, place, and time. She appears well-developed and well-nourished. No distress.   HENT:   Head: Normocephalic and atraumatic.   Mouth/Throat: Uvula is midline, oropharynx is clear and moist and mucous membranes are normal. She does not have dentures. No oral lesions. No dental abscesses or dental caries.   Eyes: Conjunctivae are normal. Pupils are equal, round, and reactive to light. No scleral icterus.   Neck: Normal range of motion.    Cardiovascular: Normal rate, regular rhythm and normal heart sounds.   No murmur heard.  Pulmonary/Chest: Effort normal and breath sounds normal. No respiratory distress. She has no wheezes. She has no rales.   Abdominal: Soft. Bowel sounds are normal. She exhibits distension (mild). There is no hepatosplenomegaly. There is no tenderness. There is no guarding.   Surgical scar noted   Musculoskeletal: She exhibits no edema.   LUE AVF c/d/i. Good thrill.   Lymphadenopathy:        Head (right side): No submental, no submandibular, no tonsillar, no preauricular, no posterior auricular and no occipital adenopathy present.        Head (left side): No submental, no submandibular, no tonsillar, no preauricular, no posterior auricular and no occipital adenopathy present.     She has no cervical adenopathy.     She has no axillary adenopathy.        Right: No inguinal, no supraclavicular and no epitrochlear adenopathy present.        Left: No inguinal, no supraclavicular and no epitrochlear adenopathy present.   Neurological: She is alert and oriented to person, place, and time.   Skin: Skin is warm, dry and intact. No rash noted.   Psychiatric: She has a normal mood and affect.     Diagnostics:   RPR   Date Value Ref Range Status   07/18/2010 Non-Reactive Non-Reactive Final     No results found for: CMVANTIBODIE  HIV-1/HIV-2 Ab   Date Value Ref Range Status   03/26/2010 Negative Negative Final     No results found for: HTLVIIIANTIB  Hepatitis B Surface Ag   Date Value Ref Range Status   09/26/2018 Negative  Final     Hep B Core Total Ab   Date Value Ref Range Status   09/26/2018 Negative  Final     Hepatitis C Ab   Date Value Ref Range Status   09/26/2018 Negative  Final     No results found for: TOXOIGG  No components found for: TOXOIGGINTER  No results found for: BVU8CHF  No results found for: BRS4BHL  No results found for: VARICELLAZOS  No results found for: VARICELLAINT  No results found for: STRONGANTIGG  No results  found for: EPSTEINBARRV  Hep B S Ab   Date Value Ref Range Status   08/16/2018 Positive (A)  Final     No results found for: QUANTIFERON  Hep A IgM   Date Value Ref Range Status   08/16/2018 Negative  Final     No results found for: PPD  No results found for this or any previous visit.         Transplant Infectious Diseases - Candidacy    Assessment/Plan:     Transplant Candidacy: Based on available information, there are no identified significant barriers to transplantation from an infectious disease standpoint pending acceptable serologies and the following. Quantiferon gold, HIV, strongyloides IgG and RPR pending. Please refer to ID clinic for any serologies requiring further evaluation. Patient has been adequately treated for MSSA bacteremia. Would continue to watch off antibiotic therapy and monitor for any systemic signs of infection. She is to call or seek medical care for any infectious concerns. It is recommended the patient have their teeth treated prior to transplant. Encouraged patient to follow up with an ophthalmologist as planned.     Immunizations recommended  - Influenza annually  - Tdap today with booster every 10 years  - Pneumovax 23 today with booster every 5 years  - Hepatitis A vaccine today and in 6 months  - Shingrix (two doses at 0 and 2-6 months)      Final determination of transplant candidacy will be made once evaluation is complete and reviewed by the Transplant Selection Committee.    Paloma Dawson PA-C         Counseling:I discussed with Holly the risk for increased susceptibility to infections following transplantation including increased risk for infection right after transplant and if rejection should occur.  The patients has been counseled on the importance of vaccinations including but not limited to a yearly flu vaccine.  Specific guidance has been provided to the patient regarding the patients occupation, hobbies and activities to avoid future infectious complications  including but not limited to avoiding undercooked meats and seafood, proper hygiene, and contact with animals.

## 2018-09-26 NOTE — PROGRESS NOTES
Transplant Nephrology  Kidney Transplant Recipient Evaluation    Referring Physician: Lei Pinedo  Current Nephrologist:     Subjective:   CC:  Initial evaluation of kidney transplant candidacy.    HPI:  Ms. Patel is a 28 y.o. year old Black or  female who has presented to be evaluated as a potential kidney transplant recipient.  She has ESRD secondary to unknown etiology.  Patient is currently on hemodialysis started on 10/2015. Patient is dialyzing on TTS schedule.  Patient reports that she is tolerating dialysis well.. She has a LUE AV fistula for dialysis access.     Previous Transplant: yes; organ: liver, date:  at [facility] [place], complications: Reports 1 episode of rejection when she was a child. Otherwise uneventful. -->Liver txp for hemangioendothelioma   IS meds. Prograf, prednisone , reported by the patient-->per liver txp:  MMF and prednisone on hold since getting d/c'd from [facility]     HX pancytopenia, ANC 2200  Lab Results   Component Value Date    ALT 38 2018    AST 29 2018     (H) 2012    ALKPHOS 739 (H) 2018    BILITOT 0.6 2018       Malignant htn--reports /100s and is managed by nephrologist  Has not seen cardiology    --> MRSA bacteremia, TTE suggestive endocarditis->-completed 6 week course vancomycin    Seizure diagnosed 2018 while hospitalized.   Followed by Dr Mendoza/ neurology  Has been on Keppra and Vimpat and no further seizures reported.   She followed up with neurology 2018 . Pt reports after this visit she stopped the Keppra and Vimpat on her own .    A0  Reports high risk pregnancy with preeclampsia   delivery     and     Past Medical and Surgical History: Ms. Patel  has a past medical history of Anemia in ESRD (end-stage renal disease), Chronic rejection of liver transplant, Depression, Encounter for blood transfusion, ESRD on hemodialysis, History of recent hospitalization, History  of splenomegaly, Immunosuppressed, Iron deficiency anemia secondary to inadequate dietary iron intake, Liver replaced by transplant, Moderate protein-calorie malnutrition, MRSA bacteremia, Pneumonia, Prophylactic immunotherapy, Renovascular hypertension, Secondary hyperparathyroidism, Seizures, Sialadenitis, and Thrombocytopenia.  She has a past surgical history that includes Liver transplant (1992); Liver biopsy;  section; Tubal ligation (); Exam Under Anesthesia (N/A, 2018); Exam under anesthesia -cervical suturing  (N/A, 2018); EMBOLIZATION, BLOOD VESSEL (N/A, 2018); Exam under anesthesia (ADD ON ) (N/A, 2018); Exam under anesthesia (N/A, 2018); SUTURE REPAIR,CERVIX (2018); CONIZATION-CERVICAL-LEEP (N/A, 6/15/2018); BIOPSY-LIVER (N/A, 2017); BIOPSY-LIVER (N/A, 3/22/2016); FISTULOGRAM (Left, 2015); and RRCEAGRRBZLY-JELRCQU-CD; upper extremity (Left, 2015).    Past Social and Family History: Ms. Patel reports that  has never smoked. she has never used smokeless tobacco. She reports that she does not drink alcohol or use drugs. Her family history includes Cancer in her sister; Hypertension in her father and mother.    Review of Systems   Constitutional: Positive for unexpected weight change. Negative for activity change, appetite change, chills, fatigue and fever.        Lost weight   HENT: Negative for congestion, facial swelling, postnasal drip, rhinorrhea, sinus pressure, sore throat and trouble swallowing.    Eyes: Positive for visual disturbance (gest blurry vision ). Negative for pain and redness.        Has an apt with opthalmology on    Respiratory: Negative for cough, chest tightness, shortness of breath and wheezing.    Cardiovascular: Positive for leg swelling. Negative for chest pain and palpitations.   Gastrointestinal: Positive for abdominal distention. Negative for abdominal pain, diarrhea, nausea and vomiting.        Ascites   "  Genitourinary: Positive for decreased urine volume. Negative for dysuria, flank pain and urgency.        Does not make urine   Musculoskeletal: Negative for gait problem, neck pain and neck stiffness.   Skin: Negative for rash.   Allergic/Immunologic: Positive for immunocompromised state. Negative for environmental allergies and food allergies.   Neurological: Positive for headaches. Negative for dizziness, weakness and light-headedness.   Psychiatric/Behavioral: Positive for sleep disturbance. Negative for agitation and confusion. The patient is not nervous/anxious.        Objective:   Blood pressure (!) 215/136, pulse 75, temperature 96.4 °F (35.8 °C), temperature source Oral, resp. rate 18, height 5' 4.76" (1.645 m), weight 50.8 kg (111 lb 15.9 oz), SpO2 100 %.body mass index is 18.77 kg/m².    Physical Exam   Constitutional: She is oriented to person, place, and time. She appears well-developed and well-nourished.   HENT:   Head: Normocephalic.   Mouth/Throat: Oropharynx is clear and moist. No oropharyngeal exudate.   Eyes: Conjunctivae and EOM are normal. Pupils are equal, round, and reactive to light. No scleral icterus.   Neck: Normal range of motion. Neck supple.   Cardiovascular: Normal rate, regular rhythm and normal heart sounds.   Pulmonary/Chest: Effort normal and breath sounds normal.   Abdominal: Soft. Normal appearance and bowel sounds are normal. She exhibits ascites. She exhibits no distension and no mass. There is no splenomegaly or hepatomegaly. There is no tenderness. There is no rebound, no guarding, no CVA tenderness, no tenderness at McBurney's point and negative Garcia's sign.   Musculoskeletal: Normal range of motion. She exhibits no edema.        Arms:  Lymphadenopathy:     She has no cervical adenopathy.   Neurological: She is alert and oriented to person, place, and time. She exhibits normal muscle tone. Coordination normal.   Skin: Skin is warm and dry.   Psychiatric: She has a normal " mood and affect. Her behavior is normal.   Vitals reviewed.      Labs:  Lab Results   Component Value Date    WBC 4.11 09/26/2018    HGB 9.3 (L) 09/26/2018    HCT 29.7 (L) 09/26/2018     09/26/2018    K 4.1 09/26/2018     09/26/2018    CO2 27 09/26/2018    BUN 24 (H) 09/26/2018    CREATININE 6.1 (H) 09/26/2018    EGFRNONAA 8.6 (A) 09/26/2018    GLUCOSE 96 11/24/2008    CALCIUM 9.8 09/26/2018    PHOS 4.3 09/26/2018    MG 1.9 09/20/2018    ALBUMIN 3.0 (L) 09/26/2018    AST 29 09/26/2018    ALT 38 09/26/2018    UTPCR 2.5 (H) 06/02/2015    PTH 1,429.0 (H) 09/26/2018    TACROLIMUS 2.9 (L) 09/21/2018       Lab Results   Component Value Date    BILIRUBINUA Negative 01/29/2018     (H) 06/27/2012    LIPASE 33 05/01/2018    PROTEINUA 2+ (A) 01/29/2018    NITRITE Negative 01/29/2018    RBCUA 10 (H) 01/29/2018    WBCUA 23 (H) 01/29/2018       No results found for: HLAABCTYPE    Labs were reviewed with the patient.    Assessment:     1. Pre-transplant evaluation for chronic kidney disease    2. Renovascular hypertension    3. Secondary hyperparathyroidism    4. Anemia in ESRD (end-stage renal disease)    5. ESRD on hemodialysis    6. Immunosuppressed    7. Hyponatremia    8. Prophylactic immunotherapy    9. Thrombocytopenia    10. Leukopenia, unspecified type    11. Liver replaced by transplant    12. Seizures    13. Elevated PTHrP level    14. Uncontrolled hypertension        Plan:   Malignant HTN --uncontrolled    Hepatology clearance--liver t xp    PTH elevated--Fax labs to dialysis/nephrologist  PTH 1429   Needs  Better  management or parathyroidectomy and maintain acceptable PTH for txp      Neurology clearance--Dr Mendoza/ neuro  HX Seizures , Pt stopped SZ meds on her own     Transplant Candidacy:   Based on available information, Ms. Patel is a high-risk kidney transplant candidate.  Final determination of transplant candidacy will be made once workup is complete and reviewed by the selection  committee.    Fidelia Naqiv NP       OS Patient Status  Functional Status: 60% - Requires occasional assistance but is able to care for needs  Physical Capacity: No Limitations

## 2018-09-28 NOTE — TELEPHONE ENCOUNTER
Letter sent, labs stable. Progarf undetected - reminded pt of the importance of medication compliance . Repeat labs due 1026/18 per protocol.

## 2018-09-28 NOTE — LETTER
September 28, 2018    Holly Patel  [address] [place] [id number]          Dear Holly Patel:  MRN: [id number]    Your lab results were stable.  Your Prograf level was undetected. Please make sure you are taking your medications as instructed.  Missing even one dose can lead to rejection.    Please have your labs drawn again on 10/26/18.      If you cannot have your labs drawn on the scheduled date, it is your responsibility to call the transplant department to reschedule.  To reschedule or make an appointment, please as to speak to or leave a message for my assistant, Jaki Chavis, at [phone].  When leaving a message for Palmira Pollack, or myself, we ask that you leave a brief message regarding your request.    Sincerely,    Violeta Francis, RN, BSN  Liver Transplant Coordinator  [facility]  [address] [place] [id number]  [phone]

## 2018-09-28 NOTE — PROGRESS NOTES
HPI       28 y.o. Female  DLS 09/19/ pt referred by Jim Sylvester/Maren AVILA      Pt states that vision be blurry sometimes.  No glasses  No   pain,flashes,floaters or double vision.    Eye Med(s) - none    (+) HTN    Last edited by Angela Gonzalez MA on 9/28/2018  1:13 PM. (History)            Assessment /Plan     For exam results, see Encounter Report.    Hypertensive retinopathy of both eyes, grade 3      parafoveal hard exudates OU  FU with Retina

## 2018-09-28 NOTE — TELEPHONE ENCOUNTER
----- Message from Lei Pinedo MD sent at 9/28/2018 10:26 AM CDT -----  Once a month    ----- Message -----  From: Violeta Francis RN  Sent: 9/28/2018   9:31 AM  To: Lei Pinedo MD    How often do you want her to have labs?  Violeta    ----- Message -----  From: Lei Pinedo MD  Sent: 9/27/2018   4:51 PM  To: [facility]Liver Transplant Clinical    Results reviewed.

## 2018-10-03 NOTE — TELEPHONE ENCOUNTER
Received call from pt. States she feels like she has ascites and wanted to know if she could have a paracentesis. Discussed with Dr. Pinedo. Per MD, pt to have us abdomen to check for ascites. If ascites present, pt can be set-up for outpatient para. Pt agreed with plan.

## 2018-10-03 NOTE — TELEPHONE ENCOUNTER
----- Message from Neelam Marroquin MD sent at 10/2/2018  5:31 PM CDT -----  Yes, she is cleared from GYN standpoint, will need a repeat pap in 6 months from her [facility]. (early December)  ----- Message -----  From: Adriana Guzmán  Sent: 10/2/2018   1:34 PM  To: Neelam Marroquin MD, Lien Marroquin,  This patient is being considered for kidney transplant. Is she cleared from   GYN standpoint? If so, please comment as to when she will need follow up/ repeat PAP.  Adriana Guzmán RN

## 2018-10-04 NOTE — SUBJECTIVE & OBJECTIVE
Review of Systems   Constitutional: Negative for activity change, appetite change, fatigue and fever.   HENT: Negative for congestion, drooling, ear discharge, facial swelling, nosebleeds, rhinorrhea, sinus pain and sneezing.    Eyes: Positive for photophobia and visual disturbance (intermittent left sided blurry vision). Negative for discharge, redness and itching.   Respiratory: Negative for apnea, cough, choking, chest tightness, shortness of breath and wheezing.    Cardiovascular: Negative for chest pain and palpitations.   Gastrointestinal: Positive for abdominal distention. Negative for abdominal pain, anal bleeding, blood in stool and constipation.   Endocrine: Negative for cold intolerance.   Genitourinary: Negative for difficulty urinating.   Musculoskeletal: Negative for arthralgias.   Neurological: Positive for headaches (left sided headache). Negative for dizziness, seizures, facial asymmetry and light-headedness.   Psychiatric/Behavioral: Negative for agitation, behavioral problems, confusion and decreased concentration.     Objective:     Vital Signs (Most Recent):  Temp: 98.3 °F (36.8 °C) (10/04/18 1808)  Pulse: 96 (10/04/18 1817)  Resp: 19 (10/04/18 1817)  BP: (!) 145/95 (10/04/18 1817)  SpO2: 98 % (10/04/18 1737) Vital Signs (24h Range):  Temp:  [98.2 °F (36.8 °C)-98.3 °F (36.8 °C)] 98.3 °F (36.8 °C)  Pulse:  [] 96  Resp:  [17-19] 19  SpO2:  [96 %-100 %] 98 %  BP: (145-254)/() 145/95   Weight: 49.9 kg (110 lb)  Body mass index is 17.23 kg/m².    No intake or output data in the 24 hours ending 10/04/18 1832    Physical Exam   Constitutional: She is oriented to person, place, and time. She appears well-developed and well-nourished. No distress.   HENT:   Head: Normocephalic and atraumatic.   Eyes: Conjunctivae are normal. Pupils are equal, round, and reactive to light.   Neck: Normal range of motion.   Cardiovascular: Regular rhythm and intact distal pulses.   Pulmonary/Chest: Effort  normal and breath sounds normal.   Abdominal: Soft. Bowel sounds are normal. There is no tenderness. There is no guarding.   Musculoskeletal: Normal range of motion.   Neurological: She is alert and oriented to person, place, and time.   Skin: Skin is warm and dry.   Psychiatric: She has a normal mood and affect. Her behavior is normal. Thought content normal.       Vents:     Lines/Drains/Airways     Drain                 Hemodialysis AV Fistula Left forearm -- days              Significant Labs:    CBC/Anemia Profile:  Recent Labs   Lab  10/04/18   1429   WBC  3.96   HGB  9.6*   HCT  30.1*   PLT  62*   MCV  87   RDW  17.7*        Chemistries:  Recent Labs   Lab  10/04/18   1429   NA  139   K  3.7   CL  99   CO2  29   BUN  15   CREATININE  4.0*   CALCIUM  9.1   ALBUMIN  3.0*  3.0*   PROT  8.3  8.3   BILITOT  0.7  0.7   ALKPHOS  814*  814*   ALT  44  44   AST  45*  45*         Significant Imaging:  CT: I have reviewed all pertinent results/findings within the past 24 hours and my personal findings are:  CT head. No acute abnormalities

## 2018-10-04 NOTE — ASSESSMENT & PLAN NOTE
H/H stable and consistent with prior admissions  -Pt endorses no bleeding  -no transfusions needed at present moment  -will monitor for bleeding

## 2018-10-04 NOTE — ED TRIAGE NOTES
Patient arrives to ED via EMS with CC of hypertension. Patient reports she was at dialysis and her pressure was high and was sent to ED. Patient denies chest pain, SOB, fever, nausea and vomiting.     Patient identifiers verified and correct for Holly Patel.    LOC: The patient is awake, alert and oriented x 4. Pt is speaking appropriately, no slurred speech.  APPEARANCE: Patient resting comfortably and in no acute distress. Pt is clean and well groomed. No JVD visible. Pt reports pain level of 10.  SKIN: Skin is warm, dry, and color is consistent with ethnicity. No tenting observed and capillary refill <3 seconds. No clubbing noted to nail beds.   MUSCULOSKELETAL: Full range of motion present in all extremities. Hand  equal and leg strength strong +5 bilaterally.  RESPIRATORY: Airway is open and patent. Respirations-unlabored, regular rate, equal bilaterally on inspiration and expiration. No accessory muscle use noted.   CARDIAC: No peripheral edema noted, and patient has no c/o chest pain.  ABDOMEN: Soft and non-tender to palpation with distention noted. Pt has no complaints of abnormal bowel movements. Pt reports normal appetite.   NEUROLOGIC: Eyes open spontaneously and facial expression symmetrical. Pt behavior appropriate to situation, and pt follows commands.  Pt reports sensation present in all extremities when touched with a finger. PERRLA.

## 2018-10-04 NOTE — ASSESSMENT & PLAN NOTE
-Presented to the ED with BP of 254/152.   -no focal neurologic signs on PE  -CT head was WNL   -Blood pressure was found the be in mid 200s when she presented for dialysis today.  -Pt reports being compliant with home blood pressure medication  -will aim for 20-25% reduction in Map from 194 to 150.  -Restarted home HTN meds: Irbesartan, labetalol   -currently on Cardene drip with MAP goals greater than 150

## 2018-10-04 NOTE — HPI
Mrs. Patel is a 26 y/o female with a PMH of ESRD (on HD MWF), h/o liver transplan in 1992 at [facility] [place] (on immunosuppression), poorly controlled HTN, diastolic HF, and anemia from chronic kidney disease and persistent vaginal bleeding after LEEP procedure in June 2018, who presented to the ED from dialysis when her blood pressure was found to be a systolic of 240.  She reports that over the past 2-3 days she has had worsening left sided headaches and intermittent blurry vision in her left eye. She has had blurry vision in the past but it has been persistent and worsening. She recently was evaluated by Dr. Moctezuma from ophthalmology and was found to have bilateraly hypertensive retinopathy. She was recommended to follow up with a retain specialist.  She reports no right sided blurry vision, nausea, vomiting, SOB, or chest pain.    Pt was recently admitted to MICU on 9/18 for severe hypertension. She initially required cardene drip and restarted home medications. Ophthalmology evaluated, evidence of hypertensive changes- flame hemorrhages and hard exudates, but no intervention needed at this time. Hepatology curbside recs to hold prednisone and cellcept, but continue prograf.

## 2018-10-04 NOTE — H&P
[facility]  Critical Care Medicine  History & Physical    Patient Name: Holly Patel  MRN: [id number]  Admission Date: 10/4/2018  Hospital Length of Stay: 0 days  Code Status: Full Code  Attending Physician: Roxie Patel MD   Primary Care Provider: Stan Sosa MD   Principal Problem: <principal problem not specified>    Subjective:     HPI:  Mrs. Patel is a 28 y/o female with a PMH of ESRD (on HD MWF), [place] [facility] [place]), poorly controlled HTN, diastolic HF, and anemia from chronic kidney disease and persistent vaginal bleeding after LEEP procedure in June 2018, who presented to the ED from dialysis when her blood pressure was found to be a systolic of 240.  She reports that over the past 2-3 days she has had worsening left sided headaches and intermittent blurry vision in her left eye. She has had blurry vision in the past but it has been persistent and worsening. She recently was evaluated by Dr. Moctezuma from ophthalmology and was found to have bilateraly hypertensive retinopathy. She was recommended to follow up with a retain specialist.  She reports no right sided blurry vision, nausea, vomiting, SOB, or chest pain.    Pt was recently admitted to MICU on 9/18 for severe hypertension. She initially required cardene drip and restarted home medications. Ophthalmology evaluated, evidence of hypertensive changes- flame hemorrhages and hard exudates, but no intervention needed at this time. Hepatology curbside recs to hold prednisone and cellcept, but continue prograf.              Hospital/ICU Course:  No notes on file         Review of Systems   Constitutional: Negative for activity change, appetite change, fatigue and fever.   HENT: Negative for congestion, drooling, ear discharge, facial swelling, nosebleeds, rhinorrhea, sinus pain and sneezing.    Eyes: Positive for photophobia and visual disturbance (intermittent left sided blurry  vision). Negative for discharge, redness and itching.   Respiratory: Negative for apnea, cough, choking, chest tightness, shortness of breath and wheezing.    Cardiovascular: Negative for chest pain and palpitations.   Gastrointestinal: Positive for abdominal distention. Negative for abdominal pain, anal bleeding, blood in stool and constipation.   Endocrine: Negative for cold intolerance.   Genitourinary: Negative for difficulty urinating.   Musculoskeletal: Negative for arthralgias.   Neurological: Positive for headaches (left sided headache). Negative for dizziness, seizures, facial asymmetry and light-headedness.   Psychiatric/Behavioral: Negative for agitation, behavioral problems, confusion and decreased concentration.     Objective:     Vital Signs (Most Recent):  Temp: 98.3 °F (36.8 °C) (10/04/18 1808)  Pulse: 96 (10/04/18 1817)  Resp: 19 (10/04/18 1817)  BP: (!) 145/95 (10/04/18 1817)  SpO2: 98 % (10/04/18 1737) Vital Signs (24h Range):  Temp:  [98.2 °F (36.8 °C)-98.3 °F (36.8 °C)] 98.3 °F (36.8 °C)  Pulse:  [] 96  Resp:  [17-19] 19  SpO2:  [96 %-100 %] 98 %  BP: (145-254)/() 145/95   Weight: 49.9 kg (110 lb)  Body mass index is 17.23 kg/m².    No intake or output data in the 24 hours ending 10/04/18 1832    Physical Exam   Constitutional: She is oriented to person, place, and time. She appears well-developed and well-nourished. No distress.   HENT:   Head: Normocephalic and atraumatic.   Eyes: Conjunctivae are normal. Pupils are equal, round, and reactive to light.   Neck: Normal range of motion.   Cardiovascular: Regular rhythm and intact distal pulses.   Pulmonary/Chest: Effort normal and breath sounds normal.   Abdominal: Soft. Bowel sounds are normal. There is no tenderness. There is no guarding.   Musculoskeletal: Normal range of motion.   Neurological: She is alert and oriented to person, place, and time.   Skin: Skin is warm and dry.   Psychiatric: She has a normal mood and affect. Her  behavior is normal. Thought content normal.       Vents:     Lines/Drains/Airways     Drain                 Hemodialysis AV Fistula Left forearm -- days              Significant Labs:    CBC/Anemia Profile:  Recent Labs   Lab  10/04/18   1429   WBC  3.96   HGB  9.6*   HCT  30.1*   PLT  62*   MCV  87   RDW  17.7*        Chemistries:  Recent Labs   Lab  10/04/18   1429   NA  139   K  3.7   CL  99   CO2  29   BUN  15   CREATININE  4.0*   CALCIUM  9.1   ALBUMIN  3.0*  3.0*   PROT  8.3  8.3   BILITOT  0.7  0.7   ALKPHOS  814*  814*   ALT  44  44   AST  45*  45*         Significant Imaging:  CT: I have reviewed all pertinent results/findings within the past 24 hours and my personal findings are:  CT head. No acute abnormalities    Assessment/Plan:     Psychiatric   Depression    Continue Celexa 20mg daily        Ophtho   Hypertensive retinopathy of both eyes, grade 3    - Blurry vision only in L eye and occurred intermittently over past 2-3 days.  - Has not had any episodes of blurry vision once in ED  - Ophthalmology consulted during last admission and reported evidence of hypertensive changes- flame hemorrhages and hard exudates, but recommended no intervention. Pt was to follow up with retina specialist  - will continue to evaluate for blurred vision or vision changes.         Cardiac/Vascular   Hypertensive emergency    -Presented to the ED with BP of 254/152.   -no focal neurologic signs on PE  -CT head was WNL   -Blood pressure was found the be in mid 200s when she presented for dialysis today.  -Pt reports being compliant with home blood pressure medication  -will aim for 20-25% reduction in Map from 194 to 150.  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  - home regimen of Prograf, Cellcept, and Prednisone.   - prograf level pending  - Hepatology curbside recs to hold prednisone and cellcept, but continue prograf tomorrow following prograf level            Critical Care Daily Checklist:    A: Awake: RASS Goal/Actual Goal:    Actual:     B: Spontaneous Breathing Trial Performed?  not intubated   C: SAT & SBT Coordinated?  Not intubated                      D: Delirium: CAM-ICU A+O   E: Early Mobility Performed? Yes   F: Feeding Goal:    Status:     Current Diet Order   Procedures    Diet renal      AS: Analgesia/Sedation none   T: Thromboembolic Prophylaxis SCDs   H: HOB > 300 Yes   U: Stress Ulcer Prophylaxis (if needed) famotidine   G: Glucose Control stable   B: Bowel Function     I: Indwelling Catheter (Lines & Rapp) Necessity reviewed   D: De-escalation of Antimicrobials/Pharmacotherapies reviewed    Plan for the day/ETD stabilize BP            Critical secondary to Patient has a condition that poses threat to life and bodily function: Hypertensive Emergency     Critical care was time spent personally by me on the following activities: development of treatment plan with patient or surrogate and bedside caregivers, discussions with consultants, evaluation of patient's response to treatment, examination of patient, ordering and performing treatments and interventions, ordering and review of laboratory studies, ordering and review of radiographic studies, pulse oximetry, re-evaluation of patient's condition. This critical care time did not overlap with that of any other provider or involve time for any procedures.     Justice Betts MD  Critical Care Medicine  [facility]

## 2018-10-04 NOTE — ASSESSMENT & PLAN NOTE
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  - home regimen of Prograf, Cellcept, and Prednisone.   - prograf level pending  - Hepatology curbside recs to hold prednisone and cellcept, but continue prograf tomorrow following prograf level

## 2018-10-04 NOTE — ED PROVIDER NOTES
Encounter Date: 10/4/2018    SCRIBE #1 NOTE: IRachelle, am scribing for, and in the presence of,  Dr. Jameson. I have scribed the entire note.   SCRIBE #2 NOTE: IEladio am scribing for, and in the presence of,  Dr. Jameson. I have scribed the remaining portions of the note not scribed by Scribe #1.     History     Chief Complaint   Patient presents with    Hypertension     Pt presented to the ED via [facility] from dialysis for a Hyptenisve emergency. Pt alert.      The patient is a 28 y.o. female with co-morbidities including: HTN, anemia, ESRD, renovascular hypertension, and history of splenomegaly, who presents to the ED with a complaint of headache as well as elevated BP during dialysis treatment. Pt's blood pressure was in 200s/130s throughout HD this morning. Pt was given 0.1mg clonidine at 8am and 10a. Pt has experienced similar BP management issues in the past. She complains of headache on left side and behind eye for the last week and blurry vision. As states pt was dialyzed this morning. Completed her HD and was dialyzed to her dry weight of 50. Pt reports compliance with her medications this morning (at 6a) which include: hydralazine, losartan, labetalol and nifedipine, she also has a weekly 0.3mg clonidine patch that was last placed on Tuesday. She denies any changes in diet or medication, chest pain, SOB, abdominal pain, and numbness or tingling. Of note, pt does not produce urine.        The history is provided by the patient.     Review of patient's allergies indicates:   Allergen Reactions    Chloral hydrate      Other reaction(s): Hallucinations  Other reaction(s): Hives    Hydrocodone Other (See Comments)     Mental status changes     Past Medical History:   Diagnosis Date    Anemia in ESRD (end-stage renal disease) 10/12/2015    dialysis tues, thursday, sat; access left arm    Chronic rejection of liver transplant 3/22/2016    Depression     Encounter for blood transfusion      ESRD on hemodialysis 2015    History of recent hospitalization 2018    pneumonia    History of splenomegaly 2016    Immunosuppressed 2017    Iron deficiency anemia secondary to inadequate dietary iron intake 2017    She [facility].   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 ()    Moderate protein-calorie malnutrition 2017    MRSA bacteremia 2017    Pneumonia     Prophylactic immunotherapy 2014    Renovascular hypertension 10/2/2015    Secondary hyperparathyroidism 2017    Seizures     Sialadenitis 3/21/2018    Thrombocytopenia 2016     Past Surgical History:   Procedure Laterality Date    BIOPSY-LIVER N/A 2017    Performed by [facility] Diagnostic Provider at [facility] OR 2ND FLR    BIOPSY-LIVER N/A 3/22/2016    Performed by [facility] Diagnostic Provider at [facility] OR 2ND FLR     SECTION      x 2    CONIZATION OF CERVIX USING LOOP ELECTROSURGICAL EXCISION PROCEDURE (LEEP) N/A 6/15/2018    Procedure: CONIZATION-CERVICAL-LEEP;  Surgeon: Neelam Marroquin MD;  Location: [facility] OR;  Service: OB/GYN;  Laterality: N/A;    CONIZATION-CERVICAL-LEEP N/A 6/15/2018    Performed by Neelam Marroquin MD at [facility] OR    [id number]; upper extremity Left 2015    Performed by Idalia Diaz MD at [facility] OR 2ND FLR    EMBOLIZATION N/A 2018    Procedure: EMBOLIZATION, BLOOD VESSEL;  Surgeon: Aren Ramos MD;  Location: [facility] CATH LAB;  Service: Radiology;  Laterality: N/A;    EMBOLIZATION, BLOOD VESSEL N/A 2018    Performed by Aren Ramos MD at [facility] CATH LAB    Exam Under Anesthesia N/A 2018    Performed by Neelam Marroquin MD at [facility] OR 2ND FLR    Exam under anesthesia N/A 2018    Performed by Neelam Marroquin MD at [facility] OR    Exam under anesthesia (ADD ON ) N/A 2018    Performed by NICKIE Alvarez MD at [facility] OR    Exam  under anesthesia -cervical suturing  N/A 7/26/2018    Performed by Lei Sims III, MD at [facility] OR    EXAMINATION UNDER ANESTHESIA N/A 6/19/2018    Procedure: Exam under anesthesia;  Surgeon: Neelam Marroquin MD;  Location: [facility];  Service: OB/GYN;  Laterality: N/A;    EXAMINATION UNDER ANESTHESIA N/A 7/9/2018    Procedure: Exam under anesthesia (ADD ON );  Surgeon: NICKIE Alvarez MD;  Location: [facility];  Service: OB/GYN;  Laterality: N/A;  (ADD ON )    EXAMINATION UNDER ANESTHESIA N/A 7/26/2018    Procedure: Exam under anesthesia -cervical suturing ;  Surgeon: Lei Sims III, MD;  Location: [facility] OR;  Service: OB/GYN;  Laterality: N/A;    EXAMINATION UNDER ANESTHESIA N/A 8/8/2018    Procedure: Exam Under Anesthesia;  Surgeon: Neelam Marroquin MD;  Location: [address] FLR;  Service: OB/GYN;  Laterality: N/A;  need endoloop  request 12 noon    FISTULOGRAM Left 12/4/2015    Performed by Idalia Diaz [facility] at [facility] CATH LAB    LIVER BIOPSY      LIVER TRANSPLANT  09/1992    PERINEORRHAPHY  6/19/2018    Procedure: SUTURE REPAIR,CERVIX;  Surgeon: Neelam Marroquin MD;  Location: [facility];  Service: OB/GYN;;    SUTURE REPAIR,CERVIX  6/19/2018    Performed by Neelam Marroquin MD at [facility] OR    TUBAL LIGATION  2010     Family History   Problem Relation Age of Onset    Hypertension Mother     Hypertension Father     Cancer Sister     Melanoma Neg Hx     Breast cancer Neg Hx     Colon cancer Neg Hx     Ovarian cancer Neg Hx      Social History     Tobacco Use    Smoking status: Never Smoker    Smokeless tobacco: Never Used   Substance Use Topics    Alcohol use: No    Drug use: No     Review of Systems   Constitutional: Negative for fever.   HENT: Negative for sore throat.    Eyes: Positive for visual disturbance (blurry vision).   Respiratory: Negative for shortness of breath.    Cardiovascular: Negative for chest pain.        Hypertensive episode   Gastrointestinal: Negative  for abdominal pain, nausea and vomiting.   Genitourinary:        Does not produce urine   Musculoskeletal: Negative for back pain.   Skin: Negative for rash.   Neurological: Positive for headaches (on left side). Negative for weakness and numbness.       Physical Exam     Initial Vitals [10/04/18 1231]   BP Pulse Resp Temp SpO2   (!) 222/137 72 17 98.2 °F (36.8 °C) 100 %      MAP       --         Physical Exam    Nursing note and vitals reviewed.  Constitutional: She is not diaphoretic. No distress.   cachectic   HENT:   Head: Normocephalic and atraumatic.   Eyes: EOM are normal. Pupils are equal, round, and reactive to light.   Neck: Normal range of motion. Neck supple. No JVD present.   Cardiovascular: Normal rate and regular rhythm.   Pulmonary/Chest: Breath sounds normal. No respiratory distress. She has no wheezes. She has no rhonchi. She has no rales.   Abdominal: Soft. Bowel sounds are normal. She exhibits no distension. There is no tenderness. There is no rebound and no guarding.   Musculoskeletal: Normal range of motion. She exhibits no edema or tenderness.   Neurological: She is alert and oriented to person, place, and time. She has normal strength. No cranial nerve deficit or sensory deficit.   Skin: Skin is warm and dry. No rash noted.   Psychiatric: She has a normal mood and affect.         ED Course   Procedures  Labs Reviewed   CBC W/ AUTO DIFFERENTIAL - Abnormal; Notable for the following components:       Result Value    RBC 3.45 (*)     Hemoglobin 9.6 (*)     Hematocrit 30.1 (*)     MCHC 31.9 (*)     RDW 17.7 (*)     Platelets 62 (*)     Lymph # 0.6 (*)     Mono # 0.2 (*)     Eos # 0.6 (*)     Lymph% 14.1 (*)     Eosinophil% 14.6 (*)     All other components within normal limits    Narrative:     ADD ON HCG Q ORDER #[id number] PER DR LOS DOSHI  10/04/2018  14:48    COMPREHENSIVE METABOLIC PANEL - Abnormal; Notable for the following components:    Creatinine 4.0 (*)     Albumin 3.0 (*)      Alkaline Phosphatase 814 (*)     AST 45 (*)     eGFR if  16.6 (*)     eGFR if non  14.4 (*)     All other components within normal limits    Narrative:     ADD ON HCG Q ORDER #[id number] PER DR LOS DOSHI  10/04/2018  14:48    CALCIUM, IONIZED - Abnormal; Notable for the following components:    Calcium, Ion 0.94 (*)     All other components within normal limits   TROPONIN I - Abnormal; Notable for the following components:    Troponin I 0.027 (*)     All other components within normal limits   HEPATIC FUNCTION PANEL - Abnormal; Notable for the following components:    Albumin 3.0 (*)     Bilirubin, Direct 0.4 (*)     AST 45 (*)     Alkaline Phosphatase 814 (*)     All other components within normal limits    Narrative:     ADD ON HCG Q ORDER #[id number] PER DR LOS DOSHI  10/04/2018  14:48   ADD ON ORDER TACRO #[id number]; PER LOS DOSHI MD  10/04/2018    17:59  ADD ON HEP FUNCTION PANEL ORDER #[id number] PER DR LOS DOSHI    10/04/2018  18:18     HCG, QUANTITATIVE, PREGNANCY   HCG, QUANTITATIVE, PREGNANCY    Narrative:     ADD ON HCG Q ORDER #[id number] PER DR LOS DOSHI  10/04/2018  14:48    HEPATIC FUNCTION PANEL   TACROLIMUS LEVEL   B-TYPE NATRIURETIC PEPTIDE   TACROLIMUS LEVEL     EKG Readings: (Independently Interpreted)   Sinus 94, LVH, no acute ischemic changes       Imaging Results          X-Ray Chest PA And Lateral (Final result)  Result time 10/04/18 18:05:49    Final result by Raheem Camacho MD (10/04/18 18:05:49)                 Impression:      1. No acute cardiopulmonary process, stable cardiomegaly.      Electronically signed by: Raheem Camacho MD  Date:    10/04/2018  Time:    18:05             Narrative:    EXAMINATION:  XR CHEST PA AND LATERAL    CLINICAL HISTORY:  Chest Pain;    TECHNIQUE:  PA and lateral views of the chest were performed.    COMPARISON:  09/18/2018    FINDINGS:  The cardiomediastinal silhouette is prominent, similar to the previous  exam.  There is stable elevation of the right hemidiaphragm..  There is no pleural effusion.  The trachea is midline.  The lungs are symmetrically expanded bilaterally without evidence of acute parenchymal process. No large focal consolidation seen.  There is no pneumothorax.  The osseous structures are unremarkable.                               CT Head Without Contrast (Final result)  Result time 10/04/18 14:55:06    Final result by Raheem Camacho MD (10/04/18 14:55:06)                 Impression:      1. No acute intracranial abnormalities.  2. Sinus disease.      Electronically signed by: Raheem Camacho MD  Date:    10/04/2018  Time:    14:55             Narrative:    EXAMINATION:  CT HEAD WITHOUT CONTRAST    CLINICAL HISTORY:  Headache, acute, norm neuro exam;    TECHNIQUE:  Low dose axial images were obtained through the head.  Coronal and sagittal reformations were also performed. Contrast was not administered.    COMPARISON:  09/18/2018    FINDINGS:  The brain is normally formed and exhibits normal density throughout.  There is no evidence of acute major vascular territory infarct, hemorrhage, or mass.  There is no hydrocephalus.  There are no abnormal extra-axial fluid collections.  There is mucous membrane thickening of the ethmoid and sphenoid sinuses, otherwise the visualized paranasal sinuses and mastoid air cells are clear, and there is no evidence of calvarial fracture.  The visualized soft tissues are unremarkable.                              X-Rays:   Independently Interpreted Readings:   Chest X-Ray: No acute abnormalities.   Head CT: No hemorrhage.     Medical Decision Making:   History:   Old Medical Records: I decided to obtain old medical records.  Initial Assessment:   29 y/o F with markedly elevated BP despite compliance with BP meds and full HD to dry weight. Seen in ED 1 week ago for HA with SBP 150s.  Ddx: ICH, hypertensive urgency/emergency  Last took PO meds at 6am (states she  takes meds 6a and 6p)  Head CT  Routine bloodwork, CXR  Hydralazine 10mg IV  cardene gtt ordered  Independently Interpreted Test(s):   I have ordered and independently interpreted X-rays - see prior notes.  I have ordered and independently interpreted EKG Reading(s) - see prior notes  Clinical Tests:   Lab Tests: Ordered and Reviewed  Radiological Study: Ordered and Reviewed            Scribe Attestation:   Scribe #1: I performed the above scribed service and the documentation accurately describes the services I performed. I attest to the accuracy of the note.  Scribe #2: I performed the above scribed service and the documentation accurately describes the services I performed. I attest to the accuracy of the note.    Attending Attestation:         Attending Critical Care:   Critical Care Times:   Direct Patient Care (initial evaluation, reassessments, and time considering the case)................................................................15 minutes.   Additional History from reviewing old medical records or taking additional history from the family, EMS, PCP, etc.......................5 minutes.   Ordering, Reviewing, and Interpreting Diagnostic Studies...............................................................................................................5 minutes.   Documentation..................................................................................................................................................................................5 minutes.   Other..................................................................................................................................................................................................5 minutes.   ==============================================================  · Total Critical Care Time - exclusive of procedural time: 35 minutes.  ==============================================================  Critical care was necessary to  treat or prevent imminent or life-threatening deterioration of the following conditions: hypertensive urgency and CNS failure.   Critical care was time spent personally by me on the following activities: obtaining history from patient or relative, examination of patient, review of old charts, ordering lab, x-rays, and/or EKG and re-evaluation of patient's conition.   Critical Care Condition: potentially life-threatening       Attending ED Notes:   5:36 PM  Pt still requiring nicardipine drip for HTN. Admitted by critical care to their service.              Clinical Impression:   The encounter diagnosis was Hypertensive emergency.      Disposition:   Disposition: Admitted  Condition: Serious                        Michelle Jameson MD  10/05/18 0047

## 2018-10-04 NOTE — ASSESSMENT & PLAN NOTE
- Blurry vision only in L eye and occurred intermittently over past 2-3 days.  - Has not had any episodes of blurry vision once in ED  - Ophthalmology consulted during last admission and reported evidence of hypertensive changes- flame hemorrhages and hard exudates, but recommended no intervention. Pt was to follow up with retina specialist  - will continue to evaluate for blurred vision or vision changes.

## 2018-10-05 PROBLEM — N18.9 CHRONIC KIDNEY DISEASE-MINERAL AND BONE DISORDER: Status: ACTIVE | Noted: 2018-01-01

## 2018-10-05 PROBLEM — M89.9 CHRONIC KIDNEY DISEASE-MINERAL AND BONE DISORDER: Status: ACTIVE | Noted: 2018-01-01

## 2018-10-05 PROBLEM — E83.9 CHRONIC KIDNEY DISEASE-MINERAL AND BONE DISORDER: Status: ACTIVE | Noted: 2018-01-01

## 2018-10-05 NOTE — ASSESSMENT & PLAN NOTE
Hx of chronic rejection.  She states she is compliant with her prograf, although multiple recent prograf values undetectable, which favors noncompliance with her meds.    Recs:  Start prograf at 6mg BID  Daily tacrolimus trough levels  cmp and INR daily    At some point in future, she will need repeat liver biopsy to determine degree of fibrosis, as she has chronic rejection and likely will develop advanced fibrosis and may need consideration for repeat transplant (+ kidney), although her compliance will be a major barrier.

## 2018-10-05 NOTE — HPI
This is a 27 y/o female hx ESRD on HD and 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   She was started on cardene gtt and is doing better this am.    Hepatology consulted for IS recommendations.  She normally takes prograf 8 mg BID, per her report.  She admits to compliance, although her prograf level is undetectable on admission, which favors against compliance.

## 2018-10-05 NOTE — ASSESSMENT & PLAN NOTE
-Presented to the ED with BP of 254/152.   -no focal neurologic signs on PE  -CT head was WNL   -Blood pressure was found the be in mid 200s when she presented for dialysis today.  -Pt and pts mom report that she is compliant with home blood pressure medication  -initially aimed for 20-25% reduction in Map from 194 to 150   -Restarted home HTN meds: Irbesartan, labetalol   -currently weaning Cardene as tolerated

## 2018-10-05 NOTE — ASSESSMENT & PLAN NOTE
Her medication regimen was adjusted back in the summer as it was thought that she was not taking her Thrice daily medications, so adjustments were made to BID dosing.  She has not had multiple admissions for hypertensive emergency    Plan/Recommendations:  -Recommend starting back on home medications  (clonidine patch, hydralazine, labetalol, irbesartan)  -recommend increasing hydralazine back to 100 mg q 8 hours  -recommend changing labetalol to 400 mg q 8 hours  -may need to consider adding minoxidil if BP still uncontrolled.  Will attempt to challenge her EDW today

## 2018-10-05 NOTE — PLAN OF CARE
PCP- DR. TRISTAN CARTER    PT HAS A RIDE HOME AND FAMILY SUPPORT WITH WITH MOTHER     PHARMACY- [facility] [id number], [place] [id number]     10/05/18 3156   Discharge Assessment   Assessment Type Discharge Planning Assessment   Confirmed/corrected address and phone number on facesheet? Yes   Assessment information obtained from? Patient   Expected Length of Stay (days) 3   Communicated expected length of stay with patient/caregiver yes   Prior to hospitilization cognitive status: Alert/Oriented   Prior to hospitalization functional status: Independent   Current cognitive status: Alert/Oriented   Current Functional Status: Independent   Lives With child(ester), dependent;parent(s)   Able to Return to Prior Arrangements yes   Is patient able to care for self after discharge? Yes   Who are your caregiver(s) and their phone number(s)? Myrna Randolph [phone]   Patient's perception of discharge disposition home or selfcare   Readmission Within The Last 30 Days no previous admission in last 30 days   Patient currently being followed by outpatient case management? No   Patient currently receives any other outside agency services? No   Equipment Currently Used at Home none   Do you have any problems affording any of your prescribed medications? No   Is the patient taking medications as prescribed? yes   Does the patient have transportation home? Yes   Transportation Available car;family or friend will provide   Dialysis Name and Scheduled days [facility] and Sat   Does the patient receive services at the [facility]mad[facility] Clinic? No   Discharge Plan A Home with family   Discharge Plan B Home with family   Patient/Family In Agreement With Plan yes

## 2018-10-05 NOTE — HPI
Ms. Patel is a 26 yo AAF with ESRD, HTN, liver transplant in 2012 who [place] [facility] [place] during hemodialysis treatment yesterday.  She reports that she completed her HD treatment and achieved her target EDW of 50 kg.  She had recent admissions to multiple hospitaltals during the summer months due to persisitent vaginal bleeding s/p LEEP procedure.  OB/GYN thought that the persisitent bleeding was due to uncontrolled hypertension and inability of cervix to completely heal.  At the time, the thought was that patient was not taking her TID dosing of multiple antihypertensives, so medications were adjusted and she was discharged home on BID dosing of some of her medications.  Since that time, she has had at least 2 admissions for symptomatic hypertension noted at dialysis, noted for blurred vision and persistent headaches.  Her EDW was challenged during her last admission in which she tolerated well and has been reaching at her dialysis unit yesterday.  She reports this morning that she has noted increased abdominal distension the past few months, which is new for her.  She reached out to her transplant coordinator with plans for evaluation of possible paracentesis if adequate pocket was found.  Labs were stable on admission, but noted for increased BNP above her baseline (>2000) despite having dialysis yesterday.  Currently oxygenating well on RA w/o pulmonary edema.    She dialyzes at [facility] under the care of Dr. Bass.  She dialyzes for 3.5 hours and reports an EDW ~ 50 kg.  She has an AVF to her LFA.  She no longer makes urine.

## 2018-10-05 NOTE — SUBJECTIVE & OBJECTIVE
Interval History/Significant Events: Pt continued to require Cardene overnight. Started to transition to home blood pressure medications.  No complaints of episodes of blurry vision since admission. Otherwise slept ok overnight.   Review of Systems   Constitutional: Negative for fatigue and fever.   HENT: Negative for congestion, nosebleeds, rhinorrhea, tinnitus, trouble swallowing and voice change.    Eyes: Negative for photophobia, discharge, redness and visual disturbance.   Respiratory: Negative for apnea, cough, choking, chest tightness and shortness of breath.    Cardiovascular: Negative for chest pain and leg swelling.   Gastrointestinal: Negative for abdominal pain, anal bleeding and blood in stool.   Musculoskeletal: Negative for arthralgias, back pain and gait problem.   Skin: Negative for color change and pallor.   Allergic/Immunologic: Negative for environmental allergies.   Neurological: Negative for dizziness, light-headedness, numbness and headaches.   Psychiatric/Behavioral: Negative for agitation, behavioral problems and confusion.     Objective:     Vital Signs (Most Recent):  Temp: 98.2 °F (36.8 °C) (10/05/18 0300)  Pulse: 98 (10/05/18 0716)  Resp: (!) 24 (10/05/18 0716)  BP: (!) 161/92 (10/05/18 0630)  SpO2: 99 % (10/05/18 0716) Vital Signs (24h Range):  Temp:  [98 °F (36.7 °C)-98.3 °F (36.8 °C)] 98.2 °F (36.8 °C)  Pulse:  [] 98  Resp:  [13-31] 24  SpO2:  [95 %-100 %] 99 %  BP: (137-254)/() 161/92   Weight: 45 kg (99 lb 3.3 oz)  Body mass index is 16.51 kg/m².      Intake/Output Summary (Last 24 hours) at 10/5/2018 0801  Last data filed at 10/5/2018 0600  Gross per 24 hour   Intake 322.96 ml   Output --   Net 322.96 ml       Physical Exam   Constitutional: She is oriented to person, place, and time. She appears well-developed.   HENT:   Head: Normocephalic and atraumatic.   Eyes: Conjunctivae are normal.   Neck: Normal range of motion. Neck supple.   Cardiovascular: Normal rate,  regular rhythm and intact distal pulses.   Pulmonary/Chest: Effort normal and breath sounds normal.   Abdominal: Soft. Bowel sounds are normal.   Musculoskeletal: Normal range of motion.   Neurological: She is alert and oriented to person, place, and time.   Psychiatric: She has a normal mood and affect. Her behavior is normal. Thought content normal.       Vents:     Lines/Drains/Airways     Drain                 Hemodialysis AV Fistula Left forearm -- days          Peripheral Intravenous Line                 Peripheral IV - Single Lumen 10/04/18 Right Antecubital 1 day              Significant Labs:    CBC/Anemia Profile:  Recent Labs   Lab  10/04/18   1429   WBC  3.96   HGB  9.6*   HCT  30.1*   PLT  62*   MCV  87   RDW  17.7*        Chemistries:  Recent Labs   Lab  10/04/18   1429  10/05/18   0509   NA  139  137  137   K  3.7  4.1  4.1   CL  99  99  99   CO2  29  25  25   BUN  15  24*  24*   CREATININE  4.0*  5.4*  5.4*   CALCIUM  9.1  8.7  8.7   ALBUMIN  3.0*  3.0*  2.8*  2.8*   PROT  8.3  8.3  8.1  8.1   BILITOT  0.7  0.7  0.5  0.5   ALKPHOS  814*  814*  786*  786*   ALT  44  44  40  40   AST  45*  45*  41*  41*   MG   --   2.3   PHOS   --   4.1

## 2018-10-05 NOTE — CONSULTS
[facility]  Hepatology  Consult Note    Patient Name: Holly Patel  MRN: [id number]  Admission Date: 10/4/2018  Hospital Length of Stay: 1 days  Attending Provider: Anibal Christine MD   Primary Care Physician: Stan Sosa MD  Principal Problem:<principal problem not specified>    Inpatient consult to Hepatology  Consult performed by: Benito Paredes MD  Consult ordered by: Benito Paredes MD    Inpatient consult to Hepatology  Consult performed by: Benito Paredes MD  Consult ordered by: Justice Betts MD        Subjective:     Transplant status: Post-transplant    HPI:  This is a 27 y/o female hx ESRD on HD and 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   She was started on cardene gtt and is doing better this am.    Hepatology consulted for IS recommendations.  She normally takes prograf 8 mg BID, per her report.  She admits to compliance, although her prograf level is undetectable on admission, which favors against compliance.        Review of Systems   Constitutional: Negative for chills, fatigue and fever.   HENT: Negative for mouth sores and nosebleeds.    Eyes: Negative for pain and redness.   Respiratory: Negative for cough and shortness of breath.    Cardiovascular: Negative for chest pain and palpitations.   Gastrointestinal: Negative for abdominal distention and abdominal pain.   Genitourinary: Negative for dysuria and hematuria.   Musculoskeletal: Negative for arthralgias and joint swelling.   Skin: Negative for color change.   Neurological: Positive for light-headedness and headaches. Negative for seizures and facial asymmetry.   Psychiatric/Behavioral: Negative for agitation and confusion.       Past Medical History:   Diagnosis Date    Anemia in ESRD (end-stage renal disease) 10/12/2015    dialysis tues, thursday, sat; access  left arm    Chronic rejection of liver transplant 3/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 ()    Moderate protein-calorie malnutrition 2017    MRSA bacteremia 2017    Pneumonia     Prophylactic immunotherapy 2014    Renovascular hypertension 10/2/2015    Secondary hyperparathyroidism 2017    Seizures     Sialadenitis 3/21/2018    Thrombocytopenia 2016       Past Surgical History:   Procedure Laterality Date    BIOPSY-LIVER N/A 2017    [facility]    BIOPSY-LIVER N/A 3/22/2016    Performed by [facility] OR 2ND FLR     SECTION      x 2    CONIZATION OF CERVIX USING LOOP ELECTROSURGICAL EXCISION PROCEDURE (LEEP) N/A 6/15/2018    Procedure: CONIZATION-CERVICAL-LEEP;  Surgeon: Neelam Marroquin MD;  Location: [facility] OR;  Service: OB/GYN;  Laterality: N/A;    CONIZATION-CERVICAL-LEEP N/A 6/15/2018    Performed by [facility]am Mar[facility]quin MD at [facility] OR    SUFRUOPCLTDM-[id number]; upper extremity Left 2015    Performed by Idalia Diaz MD at [facility] OR 2ND FLR    EMBOLIZATION N/A 2018    Procedure: EMBOLIZATION, BLOOD VESSEL;  Surgeon: Aren Ramos MD;  Location: [facility] CATH LAB;  Service: Radiology;  Laterality: N/A;    EMBOLIZATION, BLOOD VESSEL N/A 2018    Performed by Aren Ramos MD at [facility] CATH LAB    Exam Under Anesthesia N/A 2018    Performed by [facility] at [facility] OR [facility]ND [facility]LR    Exam under anesthesia N/A 2018    Performed by [facility]quin MD at  [facility] OR    Exam under anesthesia (ADD ON ) N/A 7/9/2018    Performed by NICKIE Alvarez MD at [facility] OR    Exam under anesthesia -cervical suturing  N/A 7/26/2018    Performed by Lei Sims III, MD at [facility] OR    EXAMINATION UNDER ANESTHESIA N/A 6/19/2018    Procedure: Exam under anesthesia;  Surgeon: Neelam Marroquin MD;  Location: [facility] OR;  Service: OB/GYN;  Laterality: N/A;    EXAMINATION UNDER ANESTHESIA N/A 7/9/2018    Procedure: Exam under anesthesia (ADD ON );  Surgeon: NICKIE Alvarez MD;  Location: [facility] OR;  Service: OB/GYN;  Laterality: N/A;  (ADD ON )    EXAMINATION UNDER ANESTHESIA N/A 7/26/2018    Procedure: Exam under anesthesia -cervical suturing ;  Surgeon: Lei Sims III, MD;  Location: [facility] OR;  Service: OB/GYN;  Laterality: N/A;    EXAMINATION UNDER ANESTHESIA N/A 8/8/2018    Procedure: Exam Under Anesthesia;  Surgeon: Neelam Marroquin MD;  Location: [address] FLR;  Service: OB/GYN;  Laterality: N/A;  need endoloop  request 12 noon    FISTULOGRAM Left 12/4/2015    Performed by Idalia Diaz MD at [facility] CATH LAB    LIVER BIOPSY      LIVER TRANSPLANT  09/1992    PERINEORRHAPHY  6/19/2018    Procedure: SUTURE REPAIR,CERVIX;  Surgeon: Neelam Marroquin MD;  Location: [facility];  Service: OB/GYN;;    SUTURE REPAIR,CERVIX  6/19/2018    Performed by [facility]quin MD at [facility] OR    TUBAL LIGATION  2010       Family history of liver disease: No    Review of patient's allergies indicates:   Allergen Reactions    Chloral hydrate      Other reaction(s): Hallucinations  Other reaction(s): Hives    Hydrocodone Other (See Comments)     Mental status changes       Tobacco Use    Smoking status: Never Smoker    Smokeless tobacco: Never Used   Substance and Sexual Activity    Alcohol use: No    Drug use: No    Sexual activity: No     Partners: Male       Medications Prior to Admission   Medication Sig Dispense Refill Last Dose    aspirin 81 MG Chew Take 1 tablet  (81 mg total) by mouth once daily.  0 10/3/2018    butalbital-acetaminophen-caffeine -40 mg (FIORICET, ESGIC) -40 mg per tablet Take 2 tablets by mouth every 8 (eight) hours as needed for Pain. 30 tablet 1 10/4/2018    cinacalcet (SENSIPAR) 30 MG Tab Take 1 tablet (30 mg total) by mouth daily with breakfast. 30 tablet 2 10/4/2018    citalopram (CELEXA) 20 MG tablet TAKE 1 TABLET BY MOUTH EVERY DAY 30 tablet 1 10/3/2018    cloNIDine 0.3 mg/24 hr td ptwk (CATAPRES) 0.3 mg/24 hr Place 1 patch onto the skin every 7 days. 4 patch 11 10/4/2018    famotidine (PEPCID) 40 MG tablet Take 0.5 tablets (20 mg total) by mouth once daily. 15 tablet 11 10/3/2018    hydrALAZINE (APRESOLINE) 100 MG tablet Take 1 tablet (100 mg total) by mouth every 12 (twelve) hours. 60 tablet 11 10/4/2018    hydrOXYzine HCl (ATARAX) 25 MG tablet Take 1 tablet (25 mg total) by mouth every 6 (six) hours. 12 tablet 0 10/4/2018    irbesartan (AVAPRO) 300 MG tablet Take 1 tablet (300 mg total) by mouth once daily. 90 tablet 3 10/4/2018    labetalol (NORMODYNE) 100 MG tablet Take 5 tablets (500 mg total) by mouth every 12 (twelve) hours. 300 tablet 11 10/4/2018    mycophenolate (CELLCEPT) 250 mg Cap Hold until see liver transplant clinic, per hepatology recs. 240 capsule 0 10/4/2018    NIFEdipine (PROCARDIA-XL) 60 MG (OSM) 24 hr tablet Take 2 tablets (120 mg total) by mouth once daily. 60 tablet 11 10/4/2018    predniSONE (DELTASONE) 1 MG tablet Hold until see liver transplant clinic, per hepatology recs. 30 tablet 1 10/4/2018    [] predniSONE (DELTASONE) 20 MG tablet Take 2 tablets (40 mg total) by mouth once daily. for 2 days 4 tablet 0 10/4/2018    tacrolimus (PROGRAF) 1 MG Cap Take 6 capsules (6mg) in the morning and 6 capsules (6mg) in the evening 450 capsule 0 10/4/2018    ondansetron (ZOFRAN) 4 MG tablet Take 1 tablet (4 mg total) by mouth every 6 (six) hours as needed for Nausea. (Patient taking differently: Take  4 mg by mouth once daily. ) 15 tablet 0 More than a month    promethazine (PHENERGAN) 25 MG tablet Take 1 tablet (25 mg total) by mouth every 6 (six) hours as needed for Nausea. 15 tablet 0 More than a month    triamcinolone acetonide 0.1% (KENALOG) 0.1 % ointment AAA on arms, legs, and neck bid x 1-2 wks then prn flares only 80 g 3 More than a month       Objective:     Vital Signs (Most Recent):  Temp: 98.3 °F (36.8 °C) (10/05/18 1100)  Pulse: 83 (10/05/18 1100)  Resp: (!) 32 (10/05/18 1100)  BP: (!) 158/107 (10/05/18 1100)  SpO2: 100 % (10/05/18 1100) Vital Signs (24h Range):  Temp:  [98 °F (36.7 °C)-98.3 °F (36.8 °C)] 98.3 °F (36.8 °C)  Pulse:  [] 83  Resp:  [10-36] 32  SpO2:  [95 %-100 %] 100 %  BP: (137-254)/() 158/107     Weight: 50.4 kg (111 lb 0 oz) (10/05/18 0701)  Body mass index is 18.47 kg/m².    Physical Exam   Constitutional: She is oriented to person, place, and time. No distress.   HENT:   Head: Normocephalic and atraumatic.   Eyes: Conjunctivae are normal. No scleral icterus.   Neck: Neck supple.   Cardiovascular: Normal rate and regular rhythm.   Pulmonary/Chest: Breath sounds normal. No stridor. No respiratory distress.   Abdominal: Soft. She exhibits no distension. There is no tenderness. There is no rebound and no guarding.   Musculoskeletal: She exhibits no tenderness or deformity.   Neurological: She is alert and oriented to person, place, and time.   Skin: Skin is warm and dry. No rash noted.   Psychiatric: She has a normal mood and affect.   Vitals reviewed.      MELD-Na score: 21 at 9/26/2018  7:16 AM  MELD score: 21 at 9/26/2018  7:16 AM  Calculated from:  Serum Creatinine: 0  Serum Sodium: 137 mmol/L at 9/26/2018  7:16 AM  Total Bilirubin: 0.6 mg/dL (Rounded to 1 mg/dL) at 9/26/2018  7:16 AM  INR(ratio): 1.1 at 9/26/2018  7:16 AM  Age: 28 years    Significant Labs:  CBC:   Recent Labs   Lab  10/04/18   1429   WBC  3.96   RBC  3.45*   HGB  9.6*   HCT  30.1*   PLT  62*      BMP:   Recent Labs   Lab  10/05/18   0509   GLU  102  102   NA  137  137   K  4.1  4.1   CL  99  99   CO2  25  25   BUN  24*  24*   CREATININE  5.4*  5.4*   CALCIUM  8.7  8.7     CMP:   Recent Labs   Lab  10/05/18   0509   GLU  102  102   CALCIUM  8.7  8.7   ALBUMIN  2.8*  2.8*   PROT  8.1  8.1   NA  137  137   K  4.1  4.1   CO2  25  25   CL  99  99   BUN  24*  24*   CREATININE  5.4*  5.4*   ALKPHOS  786*  786*   ALT  40  40   AST  41*  41*   BILITOT  0.5  0.5       Significant Imaging:  Labs: Reviewed    Assessment/Plan:     Liver replaced by transplant    Hx of chronic rejection.  She states she is compliant with her prograf, although multiple recent prograf values undetectable, which favors noncompliance with her meds.    Recs:  Start prograf at 6mg BID  Daily tacrolimus trough levels  cmp and INR daily    At some point in future, she will need repeat liver biopsy to determine degree of fibrosis, as she has chronic rejection and likely will develop advanced fibrosis and may need consideration for repeat transplant (+ kidney), although her compliance will be a major barrier.            Thank you for your consult. will follow by chart    Benito Paredes MD  Hepatology  [facility]

## 2018-10-05 NOTE — CONSULTS
[facility]  Nephrology  Consult Note    Patient Name: Holly Patel  MRN: [id number]  Admission Date: 10/4/2018  Hospital Length of Stay: 1 days  Attending Provider: Anibal Christine MD   Primary Care Physician: Stan Sosa MD  Principal Problem:<principal problem not specified>    Inpatient consult to Nephrology  Consult performed by: Raheem Cifuentes NP  Consult ordered by: Justice Betts MD  Reason for consult: ESRD        Subjective:     HPI: Ms. Patel is a 26 yo AAF with ESRD, HTN, liver transplant in 2012 who presents to [facility] for symptomatic hypertension during hemodialysis treatment yesterday.  She reports that she completed her HD treatment and achieved her target EDW of 50 kg.  She had recent admissions to multiple hospitaltals during the summer months due to persisitent vaginal bleeding s/p LEEP procedure.  OB/GYN thought that the persisitent bleeding was due to uncontrolled hypertension and inability of cervix to completely heal.  At the time, the thought was that patient was not taking her TID dosing of multiple antihypertensives, so medications were adjusted and she was discharged home on BID dosing of some of her medications.  Since that time, she has had at least 2 admissions for symptomatic hypertension noted at dialysis, noted for blurred vision and persistent headaches.  Her EDW was challenged during her last admission in which she tolerated well and has been reaching at her dialysis unit yesterday.  She reports this morning that she has noted increased abdominal distension the past few months, which is new for her.  She reached out to her transplant coordinator with plans for evaluation of possible paracentesis if adequate pocket was found.  Labs were stable on admission, but noted for increased BNP above her baseline (>2000) despite having dialysis yesterday.  Currently oxygenating well on RA w/o pulmonary edema.    She dialyzes at [facility] under the care of   [place].  She dialyzes for 3.5 hours and reports an EDW ~ 50 kg.  She has an AVF to her LFA.  She no longer makes urine.           Past Medical History:   Diagnosis Date    Anemia in ESRD (end-stage renal disease) 10/12/2015    dialysis es, thursday, sat; access left arm    Chronic rejection of liver transplant 3/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 ()    Moderate protein-calorie malnutrition 2017    MRSA bacteremia 2017    Pneumonia     Prophylactic immunotherapy 2014    Renovascular hypertension 10/2/2015    Secondary hyperparathyroidism 2017    Seizures     Sialadenitis 3/21/2018    Thrombocytopenia 2016       Past Surgical History:   Procedure Laterality Date    BIOPSY-LIVER N/A 2017    Performed by [facility] OR 2ND FLR    BIOPSY-LIVER N/A 3/22/2016    Performed by [facility] at [facility] OR 2ND FLR     SECTION      x 2    CONIZATION OF CERVIX USING LOOP ELECTROSURGICAL EXCISION PROCEDURE (LEEP) N/A 6/15/2018    Procedure: CONIZATION-CERVICAL-LEEP;  Surgeon: Neelam Marroquin MD;  Location: [facility] OR;  Service: OB/GYN;  Laterality: N/A;    CONIZATION-CERVICAL-LEEP N/A 6/15/2018    Performed by [facility]am Marroquin MD at [facility] OR    [id number]; upper extremity Left 2015    Performed by Idalia Diaz MD at [facility] OR 2ND FLR    EMBOLIZATION N/A 2018    Procedure: EMBOLIZATION, BLOOD VESSEL;  Surgeon: Aren Ramos MD;  Location: [facility] CATH LAB;  Service: Radiology;  Laterality: N/A;    EMBOLIZATION,  BLOOD VESSEL N/A 7/21/2018    Performed by Aren Ramos MD at [facility] CATH LAB    Exam Under Anesthesia N/A 8/8/2018    Performed by [facility]am [facility]roquin MD at [facility] OR 2ND FLR    Exam under anesthesia N/A 6/19/2018    Performed by Neelam Marroquin MD at [facility] OR    Exam under anesthesia (ADD ON ) N/A 7/9/2018    Performed by NICKIE Alvarez MD at [facility] OR    Exam under anesthesia -cervical suturing  N/A 7/26/2018    Performed by Lei Sims III, MD at [facility] OR    EXAMINATION UNDER ANESTHESIA N/A 6/19/2018    Procedure: Exam under anesthesia;  Surgeon: Neelam Marroquin MD;  Location: [facility];  Service: OB/GYN;  Laterality: N/A;    EXAMINATION UNDER ANESTHESIA N/A 7/9/2018    Procedure: Exam under anesthesia (ADD ON );  Surgeon: NICKIE Alvarez MD;  Location: [facility];  Service: OB/GYN;  Laterality: N/A;  (ADD ON )    EXAMINATION UNDER ANESTHESIA N/A 7/26/2018    Procedure: Exam under anesthesia -cervical suturing ;  Surgeon: Lei Sims III, MD;  Location: [facility] OR;  Service: OB/GYN;  Laterality: N/A;    EXAMINATION UNDER ANESTHESIA N/A 8/8/2018    Procedure: Exam Under Anesthesia;  Surgeon: Neelam Marroquin MD;  Location: [facility] OR 2ND FLR;  Service: OB/GYN;  Laterality: N/A;  need endoloop  request 12 noon    FISTULOGRAM Left 12/4/2015    Performed by Idalia Diaz MD at [facility] CATH LAB    LIVER BIOPSY      LIVER TRANSPLANT  09/1992    PERINEORRHAPHY  6/19/2018    Procedure: SUTURE REPAIR,CERVIX;  Surgeon: Neelam Marroquin MD;  Location: [facility];  Service: OB/GYN;;    SUTURE REPAIR,CERVIX  6/19/2018    Performed by Neelam Marroquin MD at [facility] OR    TUBAL LIGATION  2010       Review of patient's allergies indicates:   Allergen Reactions    Chloral hydrate      Other reaction(s): Hallucinations  Other reaction(s): Hives    Hydrocodone Other (See Comments)     Mental status changes     Current Facility-Administered Medications   Medication Frequency    acetaminophen  tablet 650 mg Q4H PRN    aspirin chewable tablet 81 mg Daily    butalbital-acetaminophen-caffeine -40 mg per tablet 2 tablet Q6H PRN    citalopram tablet 20 mg Daily    irbesartan tablet 300 mg Daily    labetalol tablet 500 mg Q12H    magnesium oxide tablet 800 mg PRN    niCARdipine 40 mg/200 mL infusion Continuous    NIFEdipine 24 hr tablet 60 mg Daily    ondansetron disintegrating tablet 8 mg Q8H PRN    potassium chloride 10% oral solution 40 mEq PRN    potassium, sodium phosphates 280-160-250 mg packet 2 packet PRN    ramelteon tablet 8 mg Nightly PRN    sodium chloride 0.9% flush 3 mL PRN    tacrolimus capsule 1 mg Daily     Family History     Problem Relation (Age of Onset)    Cancer Sister    Hypertension Mother, Father        Tobacco Use    Smoking status: Never Smoker    Smokeless tobacco: Never Used   Substance and Sexual Activity    Alcohol use: No    Drug use: No    Sexual activity: No     Partners: Male     Review of Systems   Constitutional: Negative for fatigue and fever.   HENT: Negative for congestion, nosebleeds, rhinorrhea, tinnitus, trouble swallowing and voice change.    Eyes: Positive for visual disturbance. Negative for photophobia, discharge and redness.   Respiratory: Negative for apnea, cough, choking, chest tightness and shortness of breath.    Cardiovascular: Negative for chest pain and leg swelling.   Gastrointestinal: Positive for abdominal distention. Negative for abdominal pain, anal bleeding and blood in stool.   Genitourinary: Negative for vaginal bleeding.   Musculoskeletal: Negative for arthralgias, back pain and gait problem.   Skin: Negative for color change and pallor.   Allergic/Immunologic: Negative for environmental allergies.   Neurological: Negative for dizziness, light-headedness, numbness and headaches.   Psychiatric/Behavioral: Negative for agitation, behavioral problems and confusion.     Objective:     Vital Signs (Most Recent):  Temp: 98.3 °F  (36.8 °C) (10/05/18 0701)  Pulse: 94 (10/05/18 0800)  Resp: 14 (10/05/18 0800)  BP: (!) 155/103 (10/05/18 0800)  SpO2: 98 % (10/05/18 0800)  O2 Device (Oxygen Therapy): room air (10/05/18 0800) Vital Signs (24h Range):  Temp:  [98 °F (36.7 °C)-98.3 °F (36.8 °C)] 98.3 °F (36.8 °C)  Pulse:  [] 94  Resp:  [13-31] 14  SpO2:  [95 %-100 %] 98 %  BP: (137-254)/() 155/103     Weight: 50.4 kg (111 lb 0 oz) (10/05/18 0701)  Body mass index is 18.47 kg/m².  Body surface area is 1.52 meters squared.    I/O last 3 completed shifts:  In: 335.5 [P.O.:120; I.V.:215.5]  Out: -     Physical Exam   Constitutional: She is oriented to person, place, and time. She appears well-developed and well-nourished. No distress.   HENT:   Head: Normocephalic and atraumatic.   Right Ear: External ear normal.   Left Ear: External ear normal.   Eyes: Conjunctivae and EOM are normal. Right eye exhibits no discharge. Left eye exhibits no discharge. No scleral icterus.   Neck: Normal range of motion.   Cardiovascular: Normal rate and regular rhythm. Exam reveals no gallop and no friction rub.   No murmur heard.  Pulmonary/Chest: Effort normal and breath sounds normal. No respiratory distress. She has no wheezes. She has no rales. Tenderness:    Abdominal: Soft. Bowel sounds are normal. She exhibits distension. There is no tenderness.   Musculoskeletal: Normal range of motion. She exhibits no edema or deformity.   Neurological: She is alert and oriented to person, place, and time.   Skin: Skin is warm and dry. She is not diaphoretic.   Psychiatric: She has a normal mood and affect. Her behavior is normal.       Significant Labs:  CBC:   Recent Labs   Lab  10/04/18   1429   WBC  3.96   RBC  3.45*   HGB  9.6*   HCT  30.1*   PLT  62*   MCV  87   MCH  27.8   MCHC  31.9*     CMP:   Recent Labs   Lab  10/05/18   0509   GLU  102  102   CALCIUM  8.7  8.7   ALBUMIN  2.8*  2.8*   PROT  8.1  8.1   NA  137  137   K  4.1  4.1   CO2  25  25   CL  99   99   BUN  24*  24*   CREATININE  5.4*  5.4*   ALKPHOS  786*  786*   ALT  40  40   AST  41*  41*   BILITOT  0.5  0.5       S    Assessment/Plan:     ESRD on hemodialysis    ESRD on iHD TTS  [facility]  Dr. Bass  3.5 hours  EDW of 50 kg  AVF to LFA  No residual renal function    Plan/Recommendations:  Will provide 2 hour ultrafiltration session today to attempt to challenge her EDW.    She appears euvolemic on examination, but labs are noted for elevated BNP above her baseline and she does report worsening abdominal distension the past few months, possible from DHF?  Will attempt a UF goal of 2-3L as tolerated  Discussed with CC, recommend abdominal US to determine if there is ascites            Hypertensive emergency    Her medication regimen was adjusted back in the summer as it was thought that she was not taking her Thrice daily medications, so adjustments were made to BID dosing.  She has now had multiple admissions for hypertensive emergency    Plan/Recommendations:  -Recommend starting back on home medications  (clonidine patch, hydralazine, labetalol, irbesartan)  -recommend increasing hydralazine back to 100 mg q 8 hours  -recommend changing labetalol to 400 mg q 8 hours  -may need to consider adding minoxidil if BP still uncontrolled.  Will attempt to challenge her EDW today        Chronic kidney disease-mineral and bone disorder    -Renal diet  -no need for binders, will follow  -continue cincalcet 30 mg qd          Raheem Trujillo NP  Nephrology  [facility]  Pager:  [phone]

## 2018-10-05 NOTE — HOSPITAL COURSE
Pt was admitted yesterday and was continued on Cardene. Home blood pressure medications resumed. No episodes of vision changes or focal neurologic changes.

## 2018-10-05 NOTE — SUBJECTIVE & OBJECTIVE
Review of Systems   Constitutional: Negative for chills, fatigue and fever.   HENT: Negative for mouth sores and nosebleeds.    Eyes: Negative for pain and redness.   Respiratory: Negative for cough and shortness of breath.    Cardiovascular: Negative for chest pain and palpitations.   Gastrointestinal: Negative for abdominal distention and abdominal pain.   Genitourinary: Negative for dysuria and hematuria.   Musculoskeletal: Negative for arthralgias and joint swelling.   Skin: Negative for color change.   Neurological: Positive for light-headedness and headaches. Negative for seizures and facial asymmetry.   Psychiatric/Behavioral: Negative for agitation and confusion.       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 ()    Moderate protein-calorie malnutrition 2017    MRSA bacteremia 2017    Pneumonia     Prophylactic immunotherapy 2014    Renovascular hypertension 10/2/2015    Secondary hyperparathyroidism 2017    Seizures     Sialadenitis 3/21/2018    Thrombocytopenia 2016       Past Surgical History:   Procedure Laterality Date    BIOPSY-LIVER N/A 2017    Performed by [facility] OR 2ND FLR    BIOPSY-LIVER N/A 3/22/2016    Performed by [facility] OR 2ND FLR     SECTION      x 2    CONIZATION OF CERVIX USING LOOP ELECTROSURGICAL EXCISION  PROCEDURE (LEEP) N/A 6/15/2018    Procedure: CONIZATION-CERVICAL-LEEP;  Surgeon: Neelam Marroquin MD;  Location: [facility] OR;  Service: OB/GYN;  Laterality: N/A;    CONIZATION-CERVICAL-LEEP N/A 6/15/2018    Performed by Neelam Marroquin MD at [facility] OR    [id number]; upper extremity Left 7/17/2015    [facility] OR 2ND FLR    EMBOLIZATION N/A 7/21/2018    Procedure: EMBOLIZATION, BLOOD VESSEL;  Surgeon: Aren Ramos MD;  Location: [facility] CATH LAB;  Service: Radiology;  Laterality: N/A;    EMBOLIZATION, BLOOD VESSEL N/A 7/21/2018    Performed by Aren Ramos MD at [facility] CATH LAB    Exam Under Anesthesia N/A 8/8/2018    Performed by [facility] at [facility] OR 2ND FLR    Exam under anesthesia N/A 6/19/2018    Performed by [facility]am Marroquin MD at [facility] OR    Exam under anesthesia (ADD ON ) N/A 7/9/2018    Performed by NICKIE Alvarez MD at [facility] OR    Exam under anesthesia -cervical suturing  N/A 7/26/2018    Performed by Lei Sims III, MD at [facility] OR    EXAMINATION UNDER ANESTHESIA N/A 6/19/2018    Procedure: Exam under anesthesia;  Surgeon: Neelam Marroquin MD;  Location: [facility];  Service: OB/GYN;  Laterality: N/A;    EXAMINATION UNDER ANESTHESIA N/A 7/9/2018    Procedure: Exam under anesthesia (ADD ON );  Surgeon: NICKIE Alvarez MD;  Location: [facility] OR;  Service: OB/GYN;  Laterality: N/A;  (ADD ON )    EXAMINATION UNDER ANESTHESIA N/A 7/26/2018    Procedure: Exam under anesthesia -cervical suturing ;  Surgeon: Lei Sims III, MD;  Location: [facility];  Service: OB/GYN;  Laterality: N/A;    EXAMINATION UNDER ANESTHESIA N/A 8/8/2018    Procedure: Exam Under Anesthesia;  Surgeon: Neelam Marroquin MD;  Location: [facility] OR 2ND FLR;  Service: OB/GYN;  Laterality: N/A;  need endoloop  request 12 noon    FISTULOGRAM Left 12/4/2015    Performed by Idalia Diaz MD at [facility] CATH LAB    LIVER BIOPSY      LIVER TRANSPLANT   09/1992    PERINEORRHAPHY  6/19/2018    Procedure: SUTURE REPAIR,CERVIX;  Surgeon: Neelam Marroquin MD;  Location: [facility] OR;  Service: OB/GYN;;    SUTURE REPAIR,CERVIX  6/19/2018    Performed by Neelam Marroquin MD at [facility] OR    TUBAL LIGATION  2010       Family history of liver disease: No    Review of patient's allergies indicates:   Allergen Reactions    Chloral hydrate      Other reaction(s): Hallucinations  Other reaction(s): Hives    Hydrocodone Other (See Comments)     Mental status changes       Tobacco Use    Smoking status: Never Smoker    Smokeless tobacco: Never Used   Substance and Sexual Activity    Alcohol use: No    Drug use: No    Sexual activity: No     Partners: Male       Medications Prior to Admission   Medication Sig Dispense Refill Last Dose    aspirin 81 MG Chew Take 1 tablet (81 mg total) by mouth once daily.  0 10/3/2018    butalbital-acetaminophen-caffeine -40 mg (FIORICET, ESGIC) -40 mg per tablet Take 2 tablets by mouth every 8 (eight) hours as needed for Pain. 30 tablet 1 10/4/2018    cinacalcet (SENSIPAR) 30 MG Tab Take 1 tablet (30 mg total) by mouth daily with breakfast. 30 tablet 2 10/4/2018    citalopram (CELEXA) 20 MG tablet TAKE 1 TABLET BY MOUTH EVERY DAY 30 tablet 1 10/3/2018    cloNIDine 0.3 mg/24 hr td ptwk (CATAPRES) 0.3 mg/24 hr Place 1 patch onto the skin every 7 days. 4 patch 11 10/4/2018    famotidine (PEPCID) 40 MG tablet Take 0.5 tablets (20 mg total) by mouth once daily. 15 tablet 11 10/3/2018    hydrALAZINE (APRESOLINE) 100 MG tablet Take 1 tablet (100 mg total) by mouth every 12 (twelve) hours. 60 tablet 11 10/4/2018    hydrOXYzine HCl (ATARAX) 25 MG tablet Take 1 tablet (25 mg total) by mouth every 6 (six) hours. 12 tablet 0 10/4/2018    irbesartan (AVAPRO) 300 MG tablet Take 1 tablet (300 mg total) by mouth once daily. 90 tablet 3 10/4/2018    labetalol (NORMODYNE) 100 MG tablet Take 5 tablets (500 mg total) by mouth every 12  (twelve) hours. 300 tablet 11 10/4/2018    mycophenolate (CELLCEPT) 250 mg Cap Hold until see liver transplant clinic, per hepatology recs. 240 capsule 0 10/4/2018    NIFEdipine (PROCARDIA-XL) 60 MG (OSM) 24 hr tablet Take 2 tablets (120 mg total) by mouth once daily. 60 tablet 11 10/4/2018    predniSONE (DELTASONE) 1 MG tablet Hold until see liver transplant clinic, per hepatology recs. 30 tablet 1 10/4/2018    [] predniSONE (DELTASONE) 20 MG tablet Take 2 tablets (40 mg total) by mouth once daily. for 2 days 4 tablet 0 10/4/2018    tacrolimus (PROGRAF) 1 MG Cap Take 6 capsules (6mg) in the morning and 6 capsules (6mg) in the evening 450 capsule 0 10/4/2018    ondansetron (ZOFRAN) 4 MG tablet Take 1 tablet (4 mg total) by mouth every 6 (six) hours as needed for Nausea. (Patient taking differently: Take 4 mg by mouth once daily. ) 15 tablet 0 More than a month    promethazine (PHENERGAN) 25 MG tablet Take 1 tablet (25 mg total) by mouth every 6 (six) hours as needed for Nausea. 15 tablet 0 More than a month    triamcinolone acetonide 0.1% (KENALOG) 0.1 % ointment AAA on arms, legs, and neck bid x 1-2 wks then prn flares only 80 g 3 More than a month       Objective:     Vital Signs (Most Recent):  Temp: 98.3 °F (36.8 °C) (10/05/18 1100)  Pulse: 83 (10/05/18 1100)  Resp: (!) 32 (10/05/18 1100)  BP: (!) 158/107 (10/05/18 1100)  SpO2: 100 % (10/05/18 1100) Vital Signs (24h Range):  Temp:  [98 °F (36.7 °C)-98.3 °F (36.8 °C)] 98.3 °F (36.8 °C)  Pulse:  [] 83  Resp:  [10-36] 32  SpO2:  [95 %-100 %] 100 %  BP: (137-254)/() 158/107     Weight: 50.4 kg (111 lb 0 oz) (10/05/18 0701)  Body mass index is 18.47 kg/m².    Physical Exam   Constitutional: She is oriented to person, place, and time. No distress.   HENT:   Head: Normocephalic and atraumatic.   Eyes: Conjunctivae are normal. No scleral icterus.   Neck: Neck supple.   Cardiovascular: Normal rate and regular rhythm.   Pulmonary/Chest: Breath  sounds normal. No stridor. No respiratory distress.   Abdominal: Soft. She exhibits no distension. There is no tenderness. There is no rebound and no guarding.   Musculoskeletal: She exhibits no tenderness or deformity.   Neurological: She is alert and oriented to person, place, and time.   Skin: Skin is warm and dry. No rash noted.   Psychiatric: She has a normal mood and affect.   Vitals reviewed.      MELD-Na score: 21 at 9/26/2018  7:16 AM  MELD score: 21 at 9/26/2018  7:16 AM  Calculated from:  Serum Creatinine: 0  Serum Sodium: 137 mmol/L at 9/26/2018  7:16 AM  Total Bilirubin: 0.6 mg/dL (Rounded to 1 mg/dL) at 9/26/2018  7:16 AM  INR(ratio): 1.1 at 9/26/2018  7:16 AM  Age: 28 years    Significant Labs:  CBC:   Recent Labs   Lab  10/04/18   1429   WBC  3.96   RBC  3.45*   HGB  9.6*   HCT  30.1*   PLT  62*     BMP:   Recent Labs   Lab  10/05/18   0509   GLU  102  102   NA  137  137   K  4.1  4.1   CL  99  99   CO2  25  25   BUN  24*  24*   CREATININE  5.4*  5.4*   CALCIUM  8.7  8.7     CMP:   Recent Labs   Lab  10/05/18   0509   GLU  102  102   CALCIUM  8.7  8.7   ALBUMIN  2.8*  2.8*   PROT  8.1  8.1   NA  137  137   K  4.1  4.1   CO2  25  25   CL  99  99   BUN  24*  24*   CREATININE  5.4*  5.4*   ALKPHOS  786*  786*   ALT  40  40   AST  41*  41*   BILITOT  0.5  0.5       Significant Imaging:  Labs: Reviewed

## 2018-10-05 NOTE — ASSESSMENT & PLAN NOTE
H/H stable and consistent with prior admissions  -no transfusions needed at present moment  -will monitor for bleeding

## 2018-10-05 NOTE — PROGRESS NOTES
2hr UF only treatment completed 3L of fluid removed pt tolerated well. Both needles of a LFA fistula removed and pressure held until hemostasis achieved dressing applied. Report given to primary nurse.

## 2018-10-05 NOTE — ASSESSMENT & PLAN NOTE
ESRD on iHD TTS  [facility]  Dr. Bass  3.5 hours  EDW of 50 kg  AVF to LFA  No residual renal function    Plan/Recommendations:  Will provide 2 hour ultrafiltration session today to attempt to challenge her EDW.    She appears euvolemic on examination, but labs are noted for elevated BNP above her baseline and she does report worsening abdominal distension the past few months, possible from DHF?  Will attempt a UF goal of 2-3L as tolerated  Discussed with CC, recommend abdominal US to determine if there is ascites

## 2018-10-05 NOTE — SUBJECTIVE & OBJECTIVE
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 ()    Moderate protein-calorie malnutrition 2017    MRSA bacteremia 2017    Pneumonia     Prophylactic immunotherapy 2014    Renovascular hypertension 10/2/2015    Secondary hyperparathyroidism 2017    Seizures     Sialadenitis 3/21/2018    Thrombocytopenia 2016       Past Surgical History:   Procedure Laterality Date    BIOPSY-LIVER N/A 2017    Performed by [facility] OR 2ND FLR    BIOPSY-LIVER N/A 3/22/2016    [facility]     SECTION      x 2    CONIZATION OF CERVIX USING LOOP ELECTROSURGICAL EXCISION PROCEDURE (LEEP) N/A 6/15/2018    Procedure: CONIZATION-CERVICAL-LEEP;  Surgeon: Neelam Marroquin MD;  Location: [facility] OR;  Service: OB/GYN;  Laterality: N/A;    CONIZATION-CERVICAL-LEEP N/A 6/15/2018    Performed by Neleam Marroquin MD at [facility] OR    [id number]; upper extremity Left 2015    Performed by Idalia Diaz MD at [facility] OR 2ND FLR    EMBOLIZATION N/A 2018    Procedure: EMBOLIZATION, BLOOD VESSEL;  Surgeon: Aren Ramos MD;  Location: [facility] CATH LAB;  Service: Radiology;  Laterality: N/A;    EMBOLIZATION, BLOOD VESSEL N/A 2018    Performed by Aren Ramos MD at [facility] CATH LAB    Exam Under Anesthesia N/A 2018     Performed by [facility]am [facility]roquin MD at [facility] OR 2ND FLR    Exam under anesthesia N/A 6/19/2018    Performed by Neelam Marroquni MD at [facility] OR    Exam under anesthesia (ADD ON ) N/A 7/9/2018    Performed by NICKIE Alvarez MD at [facility] OR    Exam under anesthesia -cervical suturing  N/A 7/26/2018    Performed by Lei Sims III, MD at [facility] OR    EXAMINATION UNDER ANESTHESIA N/A 6/19/2018    Procedure: Exam under anesthesia;  Surgeon: Neelam Marroquin MD;  Location: [facility] OR;  Service: OB/GYN;  Laterality: N/A;    EXAMINATION UNDER ANESTHESIA N/A 7/9/2018    Procedure: Exam under anesthesia (ADD ON );  Surgeon: NICKIE Alvarez MD;  Location: [facility];  Service: OB/GYN;  Laterality: N/A;  (ADD ON )    EXAMINATION UNDER ANESTHESIA N/A 7/26/2018    Procedure: Exam under anesthesia -cervical suturing ;  Surgeon: Lei Sims III, MD;  Location: [facility] OR;  Service: OB/GYN;  Laterality: N/A;    EXAMINATION UNDER ANESTHESIA N/A 8/8/2018    Procedure: Exam Under Anesthesia;  Surgeon: Neelam Marroquin MD;  Location: [facility] OR 2ND FLR;  Service: OB/GYN;  Laterality: N/A;  need endoloop  request 12 noon    FISTULOGRAM Left 12/4/2015    Performed by Idalia Diaz MD at [facility] CATH LAB    LIVER BIOPSY      LIVER TRANSPLANT  09/1992    PERINEORRHAPHY  6/19/2018    Procedure: SUTURE REPAIR,CERVIX;  Surgeon: Neelam Marroquin MD;  Location: [facility];  Service: OB/GYN;;    SUTURE REPAIR,CERVIX  6/19/2018    Performed by Neelam Marroquin MD at [facility] OR    TUBAL LIGATION  2010       Review of patient's allergies indicates:   Allergen Reactions    Chloral hydrate      Other reaction(s): Hallucinations  Other reaction(s): Hives    Hydrocodone Other (See Comments)     Mental status changes     Current Facility-Administered Medications   Medication Frequency    acetaminophen tablet 650 mg Q4H PRN    aspirin chewable tablet 81 mg Daily    butalbital-acetaminophen-caffeine -40 mg per tablet 2  tablet Q6H PRN    citalopram tablet 20 mg Daily    irbesartan tablet 300 mg Daily    labetalol tablet 500 mg Q12H    magnesium oxide tablet 800 mg PRN    niCARdipine 40 mg/200 mL infusion Continuous    NIFEdipine 24 hr tablet 60 mg Daily    ondansetron disintegrating tablet 8 mg Q8H PRN    potassium chloride 10% oral solution 40 mEq PRN    potassium, sodium phosphates 280-160-250 mg packet 2 packet PRN    ramelteon tablet 8 mg Nightly PRN    sodium chloride 0.9% flush 3 mL PRN    tacrolimus capsule 1 mg Daily     Family History     Problem Relation (Age of Onset)    Cancer Sister    Hypertension Mother, Father        Tobacco Use    Smoking status: Never Smoker    Smokeless tobacco: Never Used   Substance and Sexual Activity    Alcohol use: No    Drug use: No    Sexual activity: No     Partners: Male     Review of Systems   Constitutional: Negative for fatigue and fever.   HENT: Negative for congestion, nosebleeds, rhinorrhea, tinnitus, trouble swallowing and voice change.    Eyes: Positive for visual disturbance. Negative for photophobia, discharge and redness.   Respiratory: Negative for apnea, cough, choking, chest tightness and shortness of breath.    Cardiovascular: Negative for chest pain and leg swelling.   Gastrointestinal: Positive for abdominal distention. Negative for abdominal pain, anal bleeding and blood in stool.   Genitourinary: Negative for vaginal bleeding.   Musculoskeletal: Negative for arthralgias, back pain and gait problem.   Skin: Negative for color change and pallor.   Allergic/Immunologic: Negative for environmental allergies.   Neurological: Negative for dizziness, light-headedness, numbness and headaches.   Psychiatric/Behavioral: Negative for agitation, behavioral problems and confusion.     Objective:     Vital Signs (Most Recent):  Temp: 98.3 °F (36.8 °C) (10/05/18 0701)  Pulse: 94 (10/05/18 0800)  Resp: 14 (10/05/18 0800)  BP: (!) 155/103 (10/05/18 0800)  SpO2: 98 %  (10/05/18 0800)  O2 Device (Oxygen Therapy): room air (10/05/18 0800) Vital Signs (24h Range):  Temp:  [98 °F (36.7 °C)-98.3 °F (36.8 °C)] 98.3 °F (36.8 °C)  Pulse:  [] 94  Resp:  [13-31] 14  SpO2:  [95 %-100 %] 98 %  BP: (137-254)/() 155/103     Weight: 50.4 kg (111 lb 0 oz) (10/05/18 0701)  Body mass index is 18.47 kg/m².  Body surface area is 1.52 meters squared.    I/O last 3 completed shifts:  In: 335.5 [P.O.:120; I.V.:215.5]  Out: -     Physical Exam   Constitutional: She is oriented to person, place, and time. She appears well-developed and well-nourished. No distress.   HENT:   Head: Normocephalic and atraumatic.   Right Ear: External ear normal.   Left Ear: External ear normal.   Eyes: Conjunctivae and EOM are normal. Right eye exhibits no discharge. Left eye exhibits no discharge. No scleral icterus.   Neck: Normal range of motion.   Cardiovascular: Normal rate and regular rhythm. Exam reveals no gallop and no friction rub.   No murmur heard.  Pulmonary/Chest: Effort normal and breath sounds normal. No respiratory distress. She has no wheezes. She has no rales. Tenderness:    Abdominal: Soft. Bowel sounds are normal. She exhibits distension. There is no tenderness.   Musculoskeletal: Normal range of motion. She exhibits no edema or deformity.   Neurological: She is alert and oriented to person, place, and time.   Skin: Skin is warm and dry. She is not diaphoretic.   Psychiatric: She has a normal mood and affect. Her behavior is normal.       Significant Labs:  CBC:   Recent Labs   Lab  10/04/18   1429   WBC  3.96   RBC  3.45*   HGB  9.6*   HCT  30.1*   PLT  62*   MCV  87   MCH  27.8   MCHC  31.9*     CMP:   Recent Labs   Lab  10/05/18   0509   GLU  102  102   CALCIUM  8.7  8.7   ALBUMIN  2.8*  2.8*   PROT  8.1  8.1   NA  137  137   K  4.1  4.1   CO2  25  25   CL  99  99   BUN  24*  24*   CREATININE  5.4*  5.4*   ALKPHOS  786*  786*   ALT  40  40   AST  41*  41*   BILITOT  0.5  0.5        S

## 2018-10-05 NOTE — PROGRESS NOTES
[facility]  Critical Care Medicine  Progress Note    Patient Name: Holly Patel  MRN: [id number]  Admission Date: 10/4/2018  Hospital Length of Stay: 1 days  Code Status: Full Code  Attending Provider: Anibal Christine MD  Primary Care Provider: Stan Sosa MD   Principal Problem: <principal problem not specified>    Subjective:     HPI:  Mrs. Patel is a 26 y/o female with a PMH of ESRD (on HD MWF), h/o liver transplan in 1992 at [facility] (on immunosuppression), poorly controlled HTN, diastolic HF, and anemia from chronic kidney disease and persistent vaginal bleeding after LEEP procedure in June 2018, who presented to the ED from dialysis when her blood pressure was found to be a systolic of 240.  She reports that over the past 2-3 days she has had worsening left sided headaches and intermittent blurry vision in her left eye. She has had blurry vision in the past but it has been persistent and worsening. She recently was evaluated by Dr. Moctezuma from ophthalmology and was found to have bilateraly hypertensive retinopathy. She was recommended to follow up with a retain specialist.  She reports no right sided blurry vision, nausea, vomiting, SOB, or chest pain.    Pt was recently admitted to MICU on 9/18 for severe hypertension. She initially required cardene drip and restarted home medications. Ophthalmology evaluated, evidence of hypertensive changes- flame hemorrhages and hard exudates, but no intervention needed at this time. Hepatology curbside recs to hold prednisone and cellcept, but continue prograf.              Hospital/ICU Course:  Pt was admitted yesterday and was continued on Cardene. Home blood pressure medications resumed. No episodes of vision changes or focal neurologic changes.     Interval History/Significant Events: Pt continued to require Cardene overnight. Started to transition to home blood pressure medications.  No complaints of episodes of blurry vision since admission.  Otherwise slept ok overnight.   Review of Systems   Constitutional: Negative for fatigue and fever.   HENT: Negative for congestion, nosebleeds, rhinorrhea, tinnitus, trouble swallowing and voice change.    Eyes: Negative for photophobia, discharge, redness and visual disturbance.   Respiratory: Negative for apnea, cough, choking, chest tightness and shortness of breath.    Cardiovascular: Negative for chest pain and leg swelling.   Gastrointestinal: Negative for abdominal pain, anal bleeding and blood in stool.   Musculoskeletal: Negative for arthralgias, back pain and gait problem.   Skin: Negative for color change and pallor.   Allergic/Immunologic: Negative for environmental allergies.   Neurological: Negative for dizziness, light-headedness, numbness and headaches.   Psychiatric/Behavioral: Negative for agitation, behavioral problems and confusion.     Objective:     Vital Signs (Most Recent):  Temp: 98.2 °F (36.8 °C) (10/05/18 0300)  Pulse: 98 (10/05/18 0716)  Resp: (!) 24 (10/05/18 0716)  BP: (!) 161/92 (10/05/18 0630)  SpO2: 99 % (10/05/18 0716) Vital Signs (24h Range):  Temp:  [98 °F (36.7 °C)-98.3 °F (36.8 °C)] 98.2 °F (36.8 °C)  Pulse:  [] 98  Resp:  [13-31] 24  SpO2:  [95 %-100 %] 99 %  BP: (137-254)/() 161/92   Weight: 45 kg (99 lb 3.3 oz)  Body mass index is 16.51 kg/m².      Intake/Output Summary (Last 24 hours) at 10/5/2018 0801  Last data filed at 10/5/2018 0600  Gross per 24 hour   Intake 322.96 ml   Output --   Net 322.96 ml       Physical Exam   Constitutional: She is oriented to person, place, and time. She appears well-developed.   HENT:   Head: Normocephalic and atraumatic.   Eyes: Conjunctivae are normal.   Neck: Normal range of motion. Neck supple.   Cardiovascular: Normal rate, regular rhythm and intact distal pulses.   Pulmonary/Chest: Effort normal and breath sounds normal.   Abdominal: Soft. Bowel sounds are normal.   Musculoskeletal: Normal range of motion.   Neurological:  She is alert and oriented to person, place, and time.   Psychiatric: She has a normal mood and affect. Her behavior is normal. Thought content normal.       Vents:     Lines/Drains/Airways     Drain                 Hemodialysis AV Fistula Left forearm -- days          Peripheral Intravenous Line                 Peripheral IV - Single Lumen 10/04/18 Right Antecubital 1 day              Significant Labs:    CBC/Anemia Profile:  Recent Labs   Lab  10/04/18   1429   WBC  3.96   HGB  9.6*   HCT  30.1*   PLT  62*   MCV  87   RDW  17.7*        Chemistries:  Recent Labs   Lab  10/04/18   1429  10/05/18   0509   NA  139  137  137   K  3.7  4.1  4.1   CL  99  99  99   CO2  29  25  25   BUN  15  24*  24*   CREATININE  4.0*  5.4*  5.4*   CALCIUM  9.1  8.7  8.7   ALBUMIN  3.0*  3.0*  2.8*  2.8*   PROT  8.3  8.3  8.1  8.1   BILITOT  0.7  0.7  0.5  0.5   ALKPHOS  814*  814*  786*  786*   ALT  44  44  40  40   AST  45*  45*  41*  41*   MG   --   2.3   PHOS   --   4.1           Assessment/Plan:     Psychiatric   Depression    Continue Celexa 20mg daily        Ophtho   Hypertensive retinopathy of both eyes, grade 3    - Blurry vision only in L eye and occurred intermittently over past 2-3 days.  - Has not had any episodes of blurry vision once in ED  - Ophthalmology consulted during last admission + follow up in clinic found evidence of hypertensive changes- flame hemorrhages and hard exudates, but recommended no intervention. Pt was to follow up with retina specialist  - will continue to evaluate for blurred vision or vision changes.         Cardiac/Vascular   Hypertensive emergency    -Presented to the ED with BP of 254/152.   -no focal neurologic signs on PE  -CT head was WNL   -Blood pressure was found the be in mid 200s when she presented for dialysis today.  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  - home regimen of Prograf, Cellcept, and Prednisone.   - prograf level pending  - Hepatology curbside recs to hold prednisone and cellcept, but continue prograf tomorrow following prograf level           Critical Care Daily Checklist:    A: Awake: RASS Goal/Actual Goal:    Actual:     B: Spontaneous Breathing Trial Performed?  not intubated   C: SAT & SBT Coordinated?  Not intubated   D: Delirium: CAM-ICU  not delerius   E: Early Mobility Performed? Yes   F: Feeding Goal:    Status:     Current Diet Order   Procedures    Diet renal      AS: Analgesia/Sedation none   T: Thromboembolic Prophylaxis mobile   H: HOB > 300 Yes   U: Stress Ulcer Prophylaxis (if needed) famotidine   G: Glucose Control stable   B: Bowel Function     I: Indwelling Catheter (Lines & Rapp) Necessity reviewed   D: De-escalation of Antimicrobials/Pharmacotherapies reviewed    Plan for the day/ETD BP control    Code Status:  Family/Goals of Care: Full Code         Critical secondary to Patient has a condition that poses threat to life and bodily function: Hypertensive Emergency      Critical care was time spent personally by me on the following activities: development of treatment plan with patient or surrogate and bedside caregivers, discussions with consultants, evaluation of patient's response to treatment, examination of patient, ordering and performing treatments and interventions, ordering and review of laboratory studies, ordering and review of  radiographic studies, pulse oximetry, re-evaluation of patient's condition. This critical care time did not overlap with that of any other provider or involve time for any procedures.     Justice Betts MD  Critical Care Medicine  [facility]

## 2018-10-05 NOTE — PLAN OF CARE
Problem: Patient Care Overview  Goal: Plan of Care Review  Outcome: Ongoing (interventions implemented as appropriate)    No acute events throughout day. See vital signs and assessments in flowsheets. See below for updates on today's progress.     Pulmonary: Pt to RA sats %    Cardiovascular: 150s-180s/90-120s, HR 80-100s, -140s. Cardene drip stopped for a systolic <175, oral medications adjusted     Neurological: AAOx3    Gastrointestinal: WNL    Genitourinary: no urine output throughout shift, HD treatment 3L removed and tolerated well    Endocrine: WNL    Integumentary/Other: WNL    Infusions: none    Patient progressing towards goals as tolerated, plan of care communicated and reviewed with Holly Patel and family. All concerns addressed. Will continue to monitor.

## 2018-10-05 NOTE — ASSESSMENT & PLAN NOTE
- Blurry vision only in L eye and occurred intermittently over past 2-3 days.  - Has not had any episodes of blurry vision once in ED  - Ophthalmology consulted during last admission + follow up in clinic found evidence of hypertensive changes- flame hemorrhages and hard exudates, but recommended no intervention. Pt was to follow up with retina specialist  - will continue to evaluate for blurred vision or vision changes.

## 2018-10-06 NOTE — NURSING TRANSFER
Nursing Transfer Note      10/6/2018     Transfer from : [id number] to dialysis    Transfer via: strecher    Transfer with personnal belongings    Transported by : DAVID Theodore RN   Medicines sent: Yes    Chart send with patient: yes    Notified: Dialysis nurse    Patient reassessed at: 1410    Upon arrival to floor: Report off to dialysis nurse,

## 2018-10-06 NOTE — PROGRESS NOTES
[facility]  Critical Care Medicine  Progress Note    Patient Name: Holly Patel  MRN: [id number]  Admission Date: 10/4/2018  Hospital Length of Stay: 2 days  Code Status: Full Code  Attending Provider: Galindo Friedman MD  Primary Care Provider: Stan Sosa MD   Principal Problem: Hypertensive emergency    Subjective:     HPI:  Mrs. Patel is a 28 y/o female with a PMH of ESRD (on HD MWF), [place] [facility] [place]ression), poorly controlled HTN, diastolic HF, and anemia from chronic kidney disease and persistent vaginal bleeding after LEEP procedure in June 2018, who presented to the ED from dialysis when her blood pressure was found to be a systolic of 240.  She reports that over the past 2-3 days she has had worsening left sided headaches and intermittent blurry vision in her left eye. She has had blurry vision in the past but it has been persistent and worsening. She recently was evaluated by Dr. Moctezuma from ophthalmology and was found to have bilateraly hypertensive retinopathy. She was recommended to follow up with a retain specialist.  She reports no right sided blurry vision, nausea, vomiting, SOB, or chest pain.    Pt was recently admitted to MICU on 9/18 for severe hypertension. She initially required cardene drip and restarted home medications. Ophthalmology evaluated, evidence of hypertensive changes- flame hemorrhages and hard exudates, but no intervention needed at this time. Hepatology curbside recs to hold prednisone and cellcept, but continue prograf.              Hospital/ICU Course:  Pt was admitted and started on Cardene. Home blood pressure medications resumed. Clonidine discontinued. Minoxidil started. Blood pressure medications adjusted. Received HD in ICU.  No episodes of vision changes or focal neurologic changes. Cardene weaned on 10/5.         Interval History/Significant Events: Cardene was d/c yesterday. Pt had blood pressure regimen  adjusted. BP have improved and are more stable. No episodes of blurry vision. Eating ok. Passing stool ok.     Review of Systems   Constitutional: Negative for activity change, fatigue and fever.   HENT: Negative for congestion, nosebleeds, rhinorrhea, tinnitus, trouble swallowing and voice change.    Eyes: Negative for photophobia, discharge, redness and visual disturbance.   Respiratory: Negative for apnea, cough, choking, chest tightness and shortness of breath.    Cardiovascular: Negative for chest pain and leg swelling.   Gastrointestinal: Negative for abdominal pain, anal bleeding and blood in stool.   Musculoskeletal: Negative for arthralgias, back pain and gait problem.   Skin: Negative for color change and pallor.   Allergic/Immunologic: Negative for environmental allergies.   Neurological: Negative for dizziness, light-headedness, numbness and headaches.   Psychiatric/Behavioral: Negative for agitation, behavioral problems and confusion.     Objective:     Vital Signs (Most Recent):  Temp: 97.9 °F (36.6 °C) (10/06/18 0300)  Pulse: 90 (10/06/18 1115)  Resp: 15 (10/06/18 1115)  BP: (!) 144/94 (10/06/18 1100)  SpO2: 100 % (10/06/18 1115) Vital Signs (24h Range):  Temp:  [97.9 °F (36.6 °C)-98.2 °F (36.8 °C)] 97.9 °F (36.6 °C)  Pulse:  [70-94] 90  Resp:  [12-33] 15  SpO2:  [98 %-100 %] 100 %  BP: (125-188)/() 144/94   Weight: 50.4 kg (111 lb 0 oz)  Body mass index is 18.47 kg/m².      Intake/Output Summary (Last 24 hours) at 10/6/2018 1208  Last data filed at 10/6/2018 0900  Gross per 24 hour   Intake 780 ml   Output 3300 ml   Net -2520 ml       Physical Exam   Constitutional: She is oriented to person, place, and time. She appears well-developed.   HENT:   Head: Normocephalic and atraumatic.   Eyes: Conjunctivae are normal.   Neck: Normal range of motion. Neck supple.   Cardiovascular: Normal rate, regular rhythm and intact distal pulses.   Pulmonary/Chest: Effort normal and breath sounds normal.    Abdominal: Soft. Bowel sounds are normal. She exhibits distension.   Musculoskeletal: Normal range of motion.   Neurological: She is alert and oriented to person, place, and time.   Psychiatric: She has a normal mood and affect. Her behavior is normal. Thought content normal.       Vents:     Lines/Drains/Airways     Drain                 Hemodialysis AV Fistula Left forearm -- days          Peripheral Intravenous Line                 Peripheral IV - Single Lumen 10/04/18 Right Antecubital 2 days              Significant Labs:    CBC/Anemia Profile:  Recent Labs   Lab  10/04/18   1429  10/06/18   0940   WBC  3.96  6.50   HGB  9.6*  10.1*   HCT  30.1*  31.9*   PLT  62*  82*   MCV  87  88   RDW  17.7*  17.3*        Chemistries:  Recent Labs   Lab  10/04/18   1429  10/05/18   0509  10/06/18   0434   NA  139  137  137  138   K  3.7  4.1  4.1  5.4*   CL  99  99  99  100   CO2  29  25  25  24   BUN  15  24*  24*  42*   CREATININE  4.0*  5.4*  5.4*  7.5*   CALCIUM  9.1  8.7  8.7  9.1   ALBUMIN  3.0*  3.0*  2.8*  2.8*  2.9*   PROT  8.3  8.3  8.1  8.1  8.1   BILITOT  0.7  0.7  0.5  0.5  0.4   ALKPHOS  814*  814*  786*  786*  801*   ALT  44  44  40  40  46*   AST  45*  45*  41*  41*  57*   MG   --   2.3  2.6   PHOS   --   4.1  5.1*     Assessment/Plan:     Psychiatric   Depression    Continue Celexa 20mg daily        Ophtho   Hypertensive retinopathy of both eyes, grade 3    - Blurry vision only in L eye and occurred intermittently over past 2-3 days.  - Has not had any episodes of blurry vision once in ED  - Ophthalmology consulted during last admission + follow up in clinic found evidence of hypertensive changes- flame hemorrhages and hard exudates, but recommended no intervention. Pt was to follow up with retina specialist  - will continue to evaluate for blurred vision or vision changes.         Cardiac/Vascular   * Hypertensive emergency    -Presented to the ED with BP of 254/152.   -no focal  neurologic signs on PE  -CT head was WNL   -Blood pressure was found the be in mid 200s when she presented for dialysis today.  -Pt and pts mom report that she is compliant with home blood pressure medication  -initially aimed for 20-25% reduction in Map from 194 to 150   -Restarted home HTN meds. D/c clonidine. Restarted nifedipine. Started carvedilol.   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  - home regimen of Prograf, Cellcept, and Prednisone.   - prograf level low  - Hepatology evaluated. Thank you for recommendations  - Prograf 6mg PO BID           Critical Care Daily Checklist:    A: Awake: RASS Goal/Actual Goal: RASS Goal: 0-->alert and calm  Actual: Carrera Agitation Sedation Scale (RASS): Alert and calm   B: Spontaneous Breathing Trial Performed?  not intubated   C: SAT & SBT Coordinated?  Not intuabted   D: Delirium: CAM-ICU Overall CAM-ICU: Negative   E: Early Mobility Performed? Yes   F: Feeding Goal:    Status:     Current Diet Order   Procedures    Diet renal      AS: Analgesia/Sedation none   T: Thromboembolic Prophylaxis mobile   H: HOB > 300 Yes   U: Stress Ulcer Prophylaxis (if needed) none   G: Glucose Control stable   B: Bowel Function  passing stool   I: Indwelling Catheter (Lines & Rapp) Necessity reviewed   D: De-escalation of Antimicrobials/Pharmacotherapies reviewed    Plan for the day/ETD Step done    Code Status:  Family/Goals of Care: Full Code         Critical secondary to Patient has a condition that poses threat to life and bodily function: Hypertensive Emergency      Critical  care was time spent personally by me on the following activities: development of treatment plan with patient or surrogate and bedside caregivers, discussions with consultants, evaluation of patient's response to treatment, examination of patient, ordering and performing treatments and interventions, ordering and review of laboratory studies, ordering and review of radiographic studies, pulse oximetry, re-evaluation of patient's condition. This critical care time did not overlap with that of any other provider or involve time for any procedures.     Justice Betts MD  Critical Care Medicine  [facility]

## 2018-10-06 NOTE — ASSESSMENT & PLAN NOTE
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  - home regimen of Prograf, Cellcept, and Prednisone.   - prograf level low  - Hepatology evaluated. Thank you for recommendations  - Prograf 6mg PO BID

## 2018-10-06 NOTE — PLAN OF CARE
"Problem: Patient Care Overview  Goal: Plan of Care Review  Outcome: Ongoing (interventions implemented as appropriate)  3 hour dialysis complete.  Blood rinsed back.  Needles pulled.  Pressure held x 5 minutes.  Hemostasis achieved.  Covered with gauze and paper tape.  +thrill +bruit.  Net UF 0.8L.  Unable to remove more fluid due to SBP dropped from 130's to 91.  Symptomatic.  C/O feeling "hot".  Normal saline bolus of 200 mls given with good results.  Tolerated remainder of tx without incident.  Transported from dialysis unit to room 1109A via stretcher by transporter.      "

## 2018-10-06 NOTE — SUBJECTIVE & OBJECTIVE
Interval History/Significant Events: Cardene was d/c yesterday. Pt had blood pressure regimen adjusted. BP have improved and are more stable. No episodes of blurry vision. Eating ok. Passing stool ok.     Review of Systems   Constitutional: Negative for activity change, fatigue and fever.   HENT: Negative for congestion, nosebleeds, rhinorrhea, tinnitus, trouble swallowing and voice change.    Eyes: Negative for photophobia, discharge, redness and visual disturbance.   Respiratory: Negative for apnea, cough, choking, chest tightness and shortness of breath.    Cardiovascular: Negative for chest pain and leg swelling.   Gastrointestinal: Negative for abdominal pain, anal bleeding and blood in stool.   Musculoskeletal: Negative for arthralgias, back pain and gait problem.   Skin: Negative for color change and pallor.   Allergic/Immunologic: Negative for environmental allergies.   Neurological: Negative for dizziness, light-headedness, numbness and headaches.   Psychiatric/Behavioral: Negative for agitation, behavioral problems and confusion.     Objective:     Vital Signs (Most Recent):  Temp: 97.9 °F (36.6 °C) (10/06/18 0300)  Pulse: 90 (10/06/18 1115)  Resp: 15 (10/06/18 1115)  BP: (!) 144/94 (10/06/18 1100)  SpO2: 100 % (10/06/18 1115) Vital Signs (24h Range):  Temp:  [97.9 °F (36.6 °C)-98.2 °F (36.8 °C)] 97.9 °F (36.6 °C)  Pulse:  [70-94] 90  Resp:  [12-33] 15  SpO2:  [98 %-100 %] 100 %  BP: (125-188)/() 144/94   Weight: 50.4 kg (111 lb 0 oz)  Body mass index is 18.47 kg/m².      Intake/Output Summary (Last 24 hours) at 10/6/2018 1208  Last data filed at 10/6/2018 0900  Gross per 24 hour   Intake 780 ml   Output 3300 ml   Net -2520 ml       Physical Exam   Constitutional: She is oriented to person, place, and time. She appears well-developed.   HENT:   Head: Normocephalic and atraumatic.   Eyes: Conjunctivae are normal.   Neck: Normal range of motion. Neck supple.   Cardiovascular: Normal rate, regular  rhythm and intact distal pulses.   Pulmonary/Chest: Effort normal and breath sounds normal.   Abdominal: Soft. Bowel sounds are normal. She exhibits distension.   Musculoskeletal: Normal range of motion.   Neurological: She is alert and oriented to person, place, and time.   Psychiatric: She has a normal mood and affect. Her behavior is normal. Thought content normal.       Vents:     Lines/Drains/Airways     Drain                 Hemodialysis AV Fistula Left forearm -- days          Peripheral Intravenous Line                 Peripheral IV - Single Lumen 10/04/18 Right Antecubital 2 days              Significant Labs:    CBC/Anemia Profile:  Recent Labs   Lab  10/04/18   1429  10/06/18   0940   WBC  3.96  6.50   HGB  9.6*  10.1*   HCT  30.1*  31.9*   PLT  62*  82*   MCV  87  88   RDW  17.7*  17.3*        Chemistries:  Recent Labs   Lab  10/04/18   1429  10/05/18   0509  10/06/18   0434   NA  139  137  137  138   K  3.7  4.1  4.1  5.4*   CL  99  99  99  100   CO2  29  25  25  24   BUN  15  24*  24*  42*   CREATININE  4.0*  5.4*  5.4*  7.5*   CALCIUM  9.1  8.7  8.7  9.1   ALBUMIN  3.0*  3.0*  2.8*  2.8*  2.9*   PROT  8.3  8.3  8.1  8.1  8.1   BILITOT  0.7  0.7  0.5  0.5  0.4   ALKPHOS  814*  814*  786*  786*  801*   ALT  44  44  40  40  46*   AST  45*  45*  41*  41*  57*   MG   --   2.3  2.6   PHOS   --   4.1  5.1*

## 2018-10-06 NOTE — PROGRESS NOTES
HEMODIALYSIS NOTE  Patient evaluated while undergoing hemodialysis indicated for unresolved JESSICA. Tolerating session with current minimal UFR, no complications.

## 2018-10-06 NOTE — RESIDENT HANDOFF
Handoff     Primary Team: Networked reference to record PCT  Room Number: 6079/6079 A     Patient Name: Holly Patel MRN: [id number]     Date of Birth: [id number] Allergies: Chloral hydrate and Hydrocodone     Age: 28 y.o. Admit Date: 10/4/2018     Sex: female  BMI: Body mass index is 18.47 kg/m².     Code Status: Full Code        Illness Level (current clinical status): Watcher - No    Reason for Admission: Hypertensive emergency    Brief HPI (pertinent PMH and diagnosis or differential diagnosis): Mrs. Patel is a 28 y/o female with a PMH of ESRD (on HD MWF), [place] [facility] [place]), poorly controlled HTN, diastolic HF, and anemia from chronic kidney disease and persistent vaginal bleeding after LEEP procedure in June 2018, who presented to the ED from dialysis when her blood pressure was found to be a systolic of 240.  She reports that over the past 2-3 days she has had worsening left sided headaches and intermittent blurry vision in her left eye. She has had blurry vision in the past but it has been persistent and worsening. She recently was evaluated by Dr. Moctezuma from ophthalmology and was found to have bilateraly hypertensive retinopathy. She was recommended to follow up with a retain specialist.  She reports no right sided blurry vision, nausea, vomiting, SOB, or chest pain.     Pt was recently admitted to MICU on 9/18 for severe hypertension. She initially required cardene drip and restarted home medications. Ophthalmology evaluated, evidence of hypertensive changes- flame hemorrhages and hard exudates, but no intervention needed at this time. Hepatology curbside recs to hold prednisone and cellcept, but continue prograf.      Hospital Course (updated, brief assessment by system or problem, significant events): Pt was admitted and started on Cardene. Home blood pressure medications resumed. Clonidine discontinued. Minoxidil started. Blood pressure medications adjusted.  Received CRRT in ICU.  No episodes of vision changes or focal neurologic changes. Cardene weaned on 10/5.     Tasks (specific, using if-then statements): monitor blood pressure    Contingency Plan (special circumstances anticipated and plan): monitor blood pressure. If hypertensive can escalate regimen.     Estimated Discharge Date: 1-2 days    Discharge Disposition: Per hospital medicine team

## 2018-10-06 NOTE — PLAN OF CARE
ICU Transfer of Care note    Date of Admit: 10/4/2018  Date of Transfer / Stepdown: 10/6/2018  Accepting HM team: T    Brief History of Present Illness:      Holly Patel is a 27 y/o lady with ESRD, chronic diastolic CHF, Liver TXP on immunosuppressants who presented to ED from HD w/hypertensive emergency with vision changes (left eye blurry). Similar to previous episodes.  Admitted to MICU.  Tx-ed with Cardene gtt for 18-20 hours.Nicardipine stopped 10/5 morning (even though order remained in Epic until the evening; order kept in case it had to be re-started) and BP acceptable.      Consultants and Procedures:     Consultants:  Nephrology, hepatology    Procedures:    none    Transfer Information:     Diet:  Renal, low sodium    Physical Activity:  Progressive mobility protocol    To Do / Pending Studies / Follow ups:  Hypertensive emergency  - coreg 25 BID; consider increase to 37.5 BID  - hydralazine 100 q12; consider changing to q8 dosing  - irbesartan 300 daily   - nifedipine    - monoxidil 5 BID  - clonidine patch stopped    ESRD  - HD per nephrology    S/P liver transplant on chronic immunosuppression => tacrolimus level today 4.0  - f/u hepatology recs    Concerns for non-adherence to home medication regimen => tacrolimus level < 1.5 on admission     Patient has been accepted by Hospital Medicine Team T, who will assume care of the patient upon arrival to the floor from the ICU. Please contact ICU team with any concerns prior to arrival. Please contact Hospital Medicine when patient arrives to the floor.    Discussed with MICU resident, Dr Татьяна Ambrose.     Nano Meyers MD  Hospital Medicine Staff  [phone] pager

## 2018-10-06 NOTE — ASSESSMENT & PLAN NOTE
-Presented to the ED with BP of 254/152.   -no focal neurologic signs on PE  -CT head was WNL   -Blood pressure was found the be in mid 200s when she presented for dialysis today.  -Pt and pts mom report that she is compliant with home blood pressure medication  -initially aimed for 20-25% reduction in Map from 194 to 150   -Restarted home HTN meds: Irbesartan, labetalol   -currently weaning off Cardene and blood pressures have stabalized

## 2018-10-07 PROBLEM — I16.1 HYPERTENSIVE EMERGENCY: Status: RESOLVED | Noted: 2018-06-22 | Resolved: 2018-01-01

## 2018-10-07 NOTE — PLAN OF CARE
Problem: Fall Risk (Adult)  Goal: Absence of Falls  Patient will demonstrate the desired outcomes by discharge/transition of care.  Outcome: Ongoing (interventions implemented as appropriate)  Patient has had no falls this shift.  Bed locked and in lowest position.  Mother and children at bedside.  Patient doesn't always produce urine due to ESRD with Hemodialysis status.  Cardiac monitor on.  No c/o pain or discomfort this shift.

## 2018-10-07 NOTE — PLAN OF CARE
Problem: Patient Care Overview  Goal: Plan of Care Review  Outcome: Outcome(s) achieved Date Met: 10/07/18  Pt turns and repositions independently. No skin breakdown noted. Pt pain and safety monitored q 1-2 hrs this shift. Bed locked and in lowest position. Rails elevated x 3. Brakes on. Call light and personal belongings in reach. Ready for discharge.

## 2018-10-07 NOTE — NURSING
Pt with orders to d/c IV and d/c home.  Tolerated d/c of IV.   Awake, alert, and oriented with no acute distress noted. Reviewed discharge orders including ;medicine orders, prescriptions, followup appts, and patient education materials for diet and diagnosis.  Belongings packed for transport to home. Ambulating out per wheelchair with mother and children at time of departure.

## 2018-10-07 NOTE — DISCHARGE SUMMARY
Discharge Summary  Hospital Medicine    Attending Provider on Discharge: Prosper Greene MD  Hospital Medicine Team: [facility] HOSP MED R  Date of Admission:  10/4/2018     Date of Discharge:  10/7/2018  Code status: Full Code    Active Hospital Problems    Diagnosis  POA    Chronic kidney disease-mineral and bone disorder [N18.9, E83.9, M89.9]  Yes    Hypertensive retinopathy of both eyes, grade 3 [H35.033]  Yes    Hypertension [I10]  Yes    Depression [F32.9]  Yes    Anemia in ESRD (end-stage renal disease) [N18.6, D63.1]  Yes     Chronic    ESRD on hemodialysis [N18.6, Z99.2]  Not Applicable     Chronic   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 [Z94.4]  Not Applicable     Chronic     hemangioendothelioma s/p LTx (1992)        Resolved Hospital Problems    Diagnosis Date Resolved POA    *Hypertensive emergency [I16.1] 10/07/2018 Yes     HPI  Holly Patel is a 27 y/o lady with ESRD, chronic diastolic CHF, Liver TXP on immunosuppressants who presented to ED from HD w/hypertensive emergency with vision changes (left eye blurry). Similar to previous episodes.  Admitted to MICU.  Tx-ed with Cardene gtt for 18-20 hours. Nicardipine stopped 10/5 morning and BP largely remained in normal range, aside from one isolated elevated reading prior to 10/7 AM medications    Hospital Course  Her hospital course by problem was as follows:    Hypertensive Emergency  - BP at ED triage 254/152 with reported blurry vision and headache. CT head wnl's, no evidence of PRES. Started on cardene gtt per protocol with goal 20-25% reduction in MAP from 194 to 150. Admitted to MICU service initially where she was weaned off courtney gtt and several home medications resumed while others added (of note home Clonidine held). Her PO inpatient regimen consistent of: Carvedilol 25 mg BID, Hydralazine 100 mg BID, Ibersartan 300 mg daily, and Nifedipine 120 mg daily. On this regimen, her BP's remained at goal, aside from one elevated reading taken prior  to AM medications 10/7. She was discharged on the above PO regimen, new prescriptions for BP meds filled at [facility] retial pharmacy and delivered to bedside. Encouraged patient to use AM/PM pill organizer to assist with medication compliance.    Hypertensive retinopathy of both eyes, grade 3  - Patient reported blurry vision only in L eye that occurred intermittently over past 2-3 days prior to admission. Ophthalmology consulted during previous admission + follow up in clinic found evidence of hypertensive changes- flame hemorrhages and hard exudates, but recommended no intervention.   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  - Continued home regimen Prograf 6mg PO BID. Hepatology consulted for recommendations as Tacrolimus level low during admission, recommended continuing 6 mg BID as they suspect intermittent compliance at this dose. Will follow with Hepatology as outpatient    Recent Labs   Lab  10/04/18   1429  10/06/18   0940  10/07/18   0614   WBC  3.96  6.50  6.04   HGB  9.6*  10.1*  10.2*   HCT  30.1*  31.9*  32.7*   PLT  62*  82*  91*     Recent Labs   Lab  10/05/18   0509  10/06/18   0434  10/07/18   0614   NA  137  137  138  141   K  4.1  4.1  5.4*  4.5   CL  99  99  100  105   CO2  25  25  24  24   BUN  24*  24*  42*  28*   CREATININE  5.4*  5.4*  7.5*  5.5*   GLU  102  102  97  113*   CALCIUM  8.7  8.7  9.1  9.5   MG  2.3  2.6  2.3   PHOS  4.1  5.1*  4.6*     Recent Labs   Lab  10/05/18   0509  10/06/18   0434  10/07/18   0614   ALKPHOS  786*  786*  801*  823*   ALT  40  40  46*  46*   AST  41*  41*  57*  52*   ALBUMIN  2.8*  2.8*  2.9*  3.0*   PROT  8.1  8.1  8.1  8.1    BILITOT  0.5  0.5  0.4  0.4      No results for input(s): POCTGLUCOSE in the last 168 hours.  Recent Labs      10/04/18   1818   TROPONINI  0.027*     Discharge Medication List as of 10/7/2018  1:28 PM      START taking these medications    Details   carvedilol (COREG) 25 MG tablet Take 1 tablet (25 mg total) by mouth 2 (two) times daily., Starting Sun 10/7/2018, Until Thu 12/6/2018, Normal      minoxidil (LONITEN) 2.5 MG tablet Take 2 tablets (5 mg total) by mouth 2 (two) times daily., Starting Sun 10/7/2018, Until Thu 12/6/2018, Normal         CONTINUE these medications which have CHANGED    Details   hydrALAZINE (APRESOLINE) 100 MG tablet Take 1 tablet (100 mg total) by mouth every 12 (twelve) hours., Starting Sun 10/7/2018, Until Th 12/6/2018, Normal      irbesartan (AVAPRO) 300 MG tablet Take 1 tablet (300 mg total) by mouth once daily., Starting Sun 10/7/2018, Until Thu 12/6/2018, Normal      NIFEdipine (PROCARDIA-XL) 60 MG (OSM) 24 hr tablet Take 2 tablets (120 mg total) by mouth once daily., Starting Sun 10/7/2018, Until Thu 12/6/2018, Normal         CONTINUE these medications which have NOT CHANGED    Details   aspirin 81 MG Chew Take 1 tablet (81 mg total) by mouth once daily., Starting Wed 2/21/2018, Until Thu 2/21/2019, No Print      cinacalcet (SENSIPAR) 30 MG Tab Take 1 tablet (30 mg total) by mouth daily with breakfast., Starting Thu 9/20/2018, Until Fri 9/20/2019, Print      citalopram (CELEXA) 20 MG tablet TAKE 1 TABLET BY MOUTH EVERY DAY, Normal      famotidine (PEPCID) 40 MG tablet Take 0.5 tablets (20 mg total) by mouth once daily., Starting Fri 6/29/2018, Until Sat 6/29/2019, Normal      tacrolimus (PROGRAF) 1 MG Cap Take 6 capsules (6mg) in the morning and 6 capsules (6mg) in the evening, Normal      ondansetron (ZOFRAN) 4 MG tablet Take 1 tablet (4 mg total) by mouth every 6 (six) hours as needed for Nausea., Starting Thu 5/3/2018, Print      triamcinolone acetonide 0.1% (KENALOG) 0.1 %  ointment AAA on arms, legs, and neck bid x 1-2 wks then prn flares only, Normal         STOP taking these medications       butalbital-acetaminophen-caffeine -40 mg (FIORICET, ESGIC) -40 mg per tablet Comments:   Reason for Stopping:         cloNIDine 0.3 mg/24 hr td ptwk (CATAPRES) 0.3 mg/24 hr Comments:   Reason for Stopping:         hydrOXYzine HCl (ATARAX) 25 MG tablet Comments:   Reason for Stopping:         labetalol (NORMODYNE) 100 MG tablet Comments:   Reason for Stopping:         mycophenolate (CELLCEPT) 250 mg Cap Comments:   Reason for Stopping:         predniSONE (DELTASONE) 1 MG tablet Comments:   Reason for Stopping:         predniSONE (DELTASONE) 20 MG tablet Comments:   Reason for Stopping:         clindamycin (CLINDESSE) 2 % vaginal cream Comments:   Reason for Stopping:         promethazine (PHENERGAN) 25 MG tablet Comments:   Reason for Stopping:             Discharge Diet:cardiac diet with Normal Fluid intake of 1500 - 2000 mL per day    Activity: activity as tolerated    Discharge Condition: Good    Disposition: Home or Self Care     Time spent  on the discharge of the patient including review of hospital course with the patient. reviewing discharge medications and arranging follow-up care: >35 mins    Discharge examination Patient was seen and examined on the date of discharge and determined to be suitable for discharge.    Discharge plan and follow up:      Future Appointments   Date Time Provider Department Center   10/18/2018  9:20 AM Stan Sosa MD [facility] Herminio Guardado PCW   10/24/2018  8:40 AM Michael Mendoza MD [facility] NEURO [place]   10/26/2018  8:15 AM LAB, [facility] LAB [facility]   11/2/2018 10:30 AM TED Crouch MD [facility] Herminio Greene MD  Hospital Medicine Staff  [facility]   Pager: [phone]

## 2018-10-08 NOTE — TELEPHONE ENCOUNTER
Nutritional assessment from dialysis unit received and reviewed--no nutritional changes to plan needed at this time.  Pt to continue to follow-up with renal dietitians recommendations.      Fidelia Mock MS RD LDN

## 2018-10-09 NOTE — PHYSICIAN QUERY
PT Name: Holly Patel  MR #: [id number]     Physician Query Form - Diagnosis Clarification      CDS/: Day Estrada RN               Contact information:[email or web address]    This form is a permanent document in the medical record.     Query Date: October 9, 2018    By submitting this query, we are merely seeking further clarification of documentation.  Please utilize your independent clinical judgment when addressing the question(s) below.     The medical record contains the following:      Findings Supporting Clinical Information Location in Medical Record   JESSICA HEMODIALYSIS NOTE:  Patient evaluated while undergoing hemodialysis indicated for unresolved JESSICA. Tolerating session with current minimal  UFR, no complications    Chronic kidney disease-mineral and bone disorder, anemia in ESRD on hemodialysis. Hypertensive emergency, chronic diastolic CHF, liver TXP on immunosuppressants.     BUN= 15   Cr=4      GFR= 16.6  BUN= 24   Cr=5.4   GFR= 11.5  BUN= 42   Cr=7.5   GFR= 7.8  BUN= 28   Cr= 5.5  GFR= 11.3 Nephrology PN 10/6          Discharge summary        Labs 10/4  Labs 10/5  Labs 10/6  Labs 10/7     Please clarify if the ___AKI___ diagnosis has been:    [ x ] Ruled In  [  ] Ruled In, Now Resolved  [  ] Ruled Out  [  ] Clinically insignificant  [  ] Clinically undetermined  [  ] Other/Clarification of findings (please specify)_______________________________    Please document in your progress notes daily for the duration of treatment, until resolved, and include in your discharge summary.

## 2018-10-09 NOTE — PROGRESS NOTES
C3 nurse attempted to contact patient. The following occurred:   C3 nurse attempted to contact Holly Patel for a TCC post hospital discharge follow up call. The patient is unable to conduct the call @ this time. The patient requested a callback.    The patient has a scheduled EP appointment with Stan Sosa MD on 10\18 @ 0920. Message sent to Physician staff to schedule HOPSFU on or before 10\15.

## 2018-10-09 NOTE — TELEPHONE ENCOUNTER
Patient accepted, I had to schedule patient for 11AM because your schedule stops at 12PM and I could not double book the 11:40 slot for some reason. Patient also stated she is to get two more shots: SHINGLES and TDAP in order to get on the list for a kidney.

## 2018-10-09 NOTE — PROGRESS NOTES
Transplant Recipient Adult Psychosocial Assessment    Holly Patel  [address]  [place] [address]    Telephone Information:   Mobile [phone]   Home  [phone] (home)  Work  There is no work phone number on file.  E-mail  [email or web address]    Sex: female  YOB: 1990  Age: 28 y.o.    Encounter Date: 9/26/2018  [place] Citizen: yes  Primary Language: English   Needed: no    Emergency Contact:  Name: Myrna Randolph  Relationship: mother  Address: Same as pt  Phone Numbers:  [phone] (mobile)    Family/Social Support:   Number of dependents/: Pt reports Miladys (9) and Rowdy (8). Pt reports her father and sister will assist with care as needed.  Marital history: Pt reports never being . Pt reports no current significant other.  Other family dynamics: Pt reports parents: Myrna Randolph and River Gonzales; 2 sisters: Jesika and Yulissa; 2 brothers: Shay and River; and aunt: Fany. Pt identifies all family members as supportive.    Household Composition:  Name: Holly Patel  Age: 28  Relationship: patient  Does person drive? no    Name: Mervin Randolph  Age: 68  Relationship: mother  Does person drive? yes    Name: Rachel Patel  Age: 9 and 8 (respectively)  Relationship: children  Does person drive? no    Do you and your caregivers have access to reliable transportation? yes  PRIMARY CAREGIVER: Myrna Randolph (mother) will be primary caregiver, phone number [phone].      provided in-depth information to patient and caregiver regarding pre- and post-transplant caregiver role.   strongly encourages patient and caregiver to have concrete plan regarding post-transplant care giving, including back-up caregiver(s) to ensure care giving needs are met as needed.    Patient and Caregiver states understanding all aspects of caregiver role/commitment and is able/willing/committed to being caregiver to the fullest extent necessary.    Patient  "and Caregiver verbalizes understanding of the education provided today and caregiver responsibilities.         remains available. Patient and Caregiver agree to contact  in a timely manner if concerns arise.      Able to take time off work without financial concerns: yes.     Additional Significant Others who will Assist with Transplant:  Name: Jesika Randolph  Age: 48  Phone: [phone]  City: [place] State: [place]  Relationship: sister  Does person drive? yes    Name: Yulissa Randolph  Age: 45  Phone: [phone]  City: [place]: [place]  Relationship: sister  Does person drive? yes     Name: River Gonzales  Age: 59  Phone: [phone]  City: [place]: [place]  Relationship: father  Does person drive? yes    Name: David Ding (Sue)  Age: 58  Phone: [phone]  City: [place] State: [place]  Relationship: aunt  Does person drive? yes    Living Will: no  Healthcare Power of : no  Advance Directives on file: <<no information> per medical record.  Verbally reviewed LW/HCPA information.   provided patient with copy of LW/HCPA documents and provided education on completion of forms.    Living Donors: Yes.  Name: Myrna Randolph (mother), Kaz Schmidt (nephew), + other family members. Education and resource information given to patient.    Highest Education Level: High School (9-12) or GED (11th)  Reading Ability: 11th grade  Reports difficulty with: seeing and reprots blurry vision  Learns Best By:  a combination of verbal, written, and tactile instructions.     Status: no  VA Benefits: no     Working for Income: No  If no, reason not working: Disability  Patient is disabled.  Prior to disability, patient  was employed as an  at the [employer]..    Spouse/Significant Other Employment: Pt reports no current significant other. Pt's mother reports working as a  at [employer] Full-time. Pt's father reports he is " disabled.    Disabled: yes: date disability began: , due to: ESRD.    Monthly Income:  SS Disability: $894  Food [place]: $44  Other household members total: $600 (son's disability check)  Other: $1732 (mom's wages)     Able to afford all costs now and if transplanted, including medications: yes Pt's mother reports she can assist. Pt reports wanting to return to work.  Patient and Caregiver verbalizes understanding of personal responsibilities related to transplant costs and the importance of having a financial plan to ensure that patients transplant costs are fully covered.       provided fundraising information/education. Patient and Caregiververbalizes understanding.   remains available.    Insurance:   Payor/Plan Subscr  Sex Relation Sub. Ins. ID Effective Group Num   1. [place] - [place]* SYEDA BANKS * 1990 Female  [id number] 16                                    [address]   2. [place] * 1990 Female  [id number]* 16                                    [address] [address]     Primary Insurance (for UNOS reporting): Public Insurance - Medicare FFS (Fee For Service)  Secondary Insurance (for UNOS reporting): Public Insurance - Medicaid  Patient and Caregiver verbalizes clear understanding that patient may experience difficulty obtaining and/or be denied insurance coverage post-surgery. This includes and is not limited to disability insurance, life insurance, health insurance, burial insurance, long term care insurance, and other insurances.      Patient and Caregiver also reports understanding that future health concerns related to or unrelated to transplantation may not be covered by patient's insurance.  Resources and information provided and reviewed.     Patient and Caregiver provides verbal permission to release any necessary information to outside resources for patient care and discharge planning.  Resources and information provided are  reviewed.      Dialysis Adherence: Patient reports being on hemodialysis in center, attending all dialysis appointments, and staying for the entire course of treatment. Moon Saldaña at pt's dialysis center reports over last three months that the patient has had no AMAs, not had any no-shows, and no issues with caregivers, transportation, or any other psychosocial concerns..    Infusion Service: patient utilizing? no  Home Health: patient utilizing? no but has used in past for med rec's  DME: yes BP Cuff  Pulmonary/Cardiac Rehab: Pt denies   ADLS:  Pt reports issues with driving due to dizziness. Pt reports no difficulties withwalking, bathing, cooking, housekeeping, eating, shopping, and taking medication.    Adherence:   Pt reports suitable adherence with medications, dialysis, and health regimen.  Adherence education and counseling provided.     Per History Section:  Past Medical History:   Diagnosis Date    Anemia in ESRD (end-stage renal disease) 10/12/2015    dialysis tues, thursday, sat; access left arm    Chronic rejection of liver transplant 3/22/2016    Depression     Encounter for blood transfusion     ESRD on hemodialysis 9/30/2015    History of recent hospitalization 05/2018    pneumonia    History of splenomegaly 4/12/2016    Immunosuppressed 8/5/2017    Iron deficiency anemia secondary to inadequate dietary iron intake 8/16/2017    She receives IV iron periodically at the Dialysis Center.   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)    Moderate protein-calorie malnutrition 8/16/2017    MRSA bacteremia 8/6/2017    Pneumonia     Prophylactic immunotherapy 8/4/2014    Renovascular hypertension 10/2/2015    Secondary hyperparathyroidism 8/5/2017    Seizures     Sialadenitis 3/21/2018    Thrombocytopenia 4/12/2016     Social History     Tobacco Use    Smoking status: Never Smoker    Smokeless tobacco: Never Used   Substance Use Topics    Alcohol use:  No     Social History     Substance and Sexual Activity   Drug Use No     Social History     Substance and Sexual Activity   Sexual Activity No    Partners: Male       Per Today's Psychosocial:  Tobacco: none, patient denies any use.  Alcohol: none, patient denies any use.  Illicit Drugs/Non-prescribed Medications: none, patient denies any use.    Patient and Caregiver states clear understanding of the potential impact of substance use as it relates to transplant candidacy and is aware of possible random substance screening.  Substance abstinence/cessation counseling, education and resources provided and reviewed.     Arrests: Pt reports arrests when younger, but no skilled nursing time. Pt reports no pending/outstanding legal issues.  DWI/Treatment/Rehab: patient denies    Psychiatric History:    Mental Health: Pt reports no history of or current mental health issues or concerns.   Psychiatrist/Counselor: Pt denies seeing a mental health professional and reports being open to seeing the psych department for talk therapy if necessary.  Medications:  Pt denies taking medications for mental health reasons.  Suicide/Homicide Issues: Pt denies any history of or current suicidal or homicidal ideations.    Safety at home: Pt reports no current or history of safety concerns in household; including mental, physical, verbal, or sexual abuse.    Knowledge: Patient and Caregiver states having clear understanding and realistic expectations regarding the potential risks and potential benefits of organ transplantation and organ donation and agrees to discuss with health care team members and support system members, as well as to utilize available resources and express questions and/or concerns in order to further facilitate the pt informed decision-making.  Resources and information provided and reviewed.    Patient and Caregiver is aware of [facility]'s affiliation and/or partnership with agencies in home health care, LTAC, SNF, DME, and  other hospitals and clinics.    Understanding: Patient and Caregiver reports having a clear understanding of the many lifetime commitments involved with being a transplant recipient, including costs, compliance, medications, lab work, procedures, appointments, concrete and financial planning, preparedness, timely and appropriate communication of concerns, abstinence (ETOH, tobacco, illicit non-prescribed drugs), adherence to all health care team recommendations, support system and caregiver involvement, appropriate and timely resource utilization and follow-through, mental health counseling as needed/recommended, and patient and caregiver responsibilities.  Social Service Handbook, resources and detailed educational information provided and reviewed.  Educational information provided.    Patient and Caregiver also reports current and expected compliance with health care regime and states having a clear understanding of the importance of compliance.      Patient and Caregiver reports a clear understanding that risks and benefits may be involved with organ transplantation and with organ donation.       Patient and Caregiver also reports clear understanding that psychosocial risk factors may affect patient, and include but are not limited to feelings of depression, generalized anxiety, anxiety regarding dependence on others, post traumatic stress disorder, feelings of guilt and other emotional and/or mental concerns, and/or exacerbation of existing mental health concerns.  Detailed resources provided and discussed.      Patient and Caregiver agrees to access appropriate resources in a timely manner as needed and/or as recommended, and to communicate concerns appropriately.  Patient and Caregiver also reports a clear understanding of treatment options available.     Patient and Caregiver received education in a group setting.   reviewed education, provided additional information, and answered  questions.    Feelings or Concerns: Patient and Caregiver did not express any concerns at this time.    Coping: Pt reports coping well with the transplant process at this time and reports playing cards and spending time with her children, family, and friends as ways to cope. Pt reports Orthodox home as [facility] in [place], [place] with Toshia Brooklynn Pineda presiding.     Goals: Pt reports going back to work, eating right, gaining weight, being healthy, and getting off dialysis as goals for post transplant..  Patient referred to Vocational Rehabilitation.    Interview Behavior: Patient and Caregiver presents as alert and oriented x 4, pleasant, good eye contact, well groomed, recall good, concentration/judgement good, average intelligence, calm, communicative, cooperative and asking and answering questions appropriately. Pt presents with Myrna Randolph and River Gonzales, pt's parents at pt's request.         Transplant Social Work - Candidacy  Assessment/Plan:     Psychosocial Suitability: Patient presents as a suitable candidate for kidney transplant at this time. Based on psychosocial risk factors, patient presents as low risk, due to suitable caregiver plan in place for self and minor children, understanding of the transplan process due to previous liver transplant, suitable financial plan in place with assistance form pt's mother and desire to return to work, and suitable dialysis adherence.    Recommendations/Additional Comments: SW recommends that pt conduct fundraising to assist pt with pay for cost of medications, food, gas, and other transplant related needs. SW recommends that pt remain aware of potential mental health concerns and contact the team if any concerns arise. SW recommends that pt remain abstinent from tobacco, ETOH, and drug use. SW supports pt's continued adherence. SW remains available to answer any questions or concerns that arise as the pt moves through the transplant process.     Tabatha  RICKEY Mejia, [facility]

## 2018-10-09 NOTE — TELEPHONE ENCOUNTER
Hi, I can add her on on Friday 10/12 at 12pm.  Let me know if that does not work,.  Thank you, Stan Sosa

## 2018-10-09 NOTE — TELEPHONE ENCOUNTER
Please see previous note, thank you. Spoke with patient in regards to getting a HOSP F/U before 10/18 but patient has dialysis on the available days that was offered on the schedule.

## 2018-10-12 NOTE — PROGRESS NOTES
Subjective:       Patient ID: Holly Patel is a 28 y.o. female.    Chief Complaint: Follow-up    Here for hosp f/u.    Wonders abt shingles and tetanus vaccines, thinks she needs these for transplant eligible.  She has had shingles and has + immunity to varicella.    No complaints today.    Does get occasional cough spells which then include nausea and post tussive vomiting. Last happened yesterday and may have resolved once she had HD.    Claims to be taking meds as rxed. Claims to be taking coreg too, but hr is long today.    No headaches, no chest pains or visual complaints today.      Review of Systems   Constitutional: Positive for activity change and fatigue. Negative for diaphoresis, fever and unexpected weight change.   Respiratory: Positive for shortness of breath (stable). Negative for chest tightness and wheezing.    Cardiovascular: Negative for chest pain, palpitations and leg swelling.   Gastrointestinal: Positive for abdominal distention. Negative for nausea and vomiting.   Skin: Negative for rash and wound.   Neurological: Negative for tremors and weakness.   Hematological: Negative for adenopathy. Does not bruise/bleed easily.   Psychiatric/Behavioral: Negative for confusion.       Objective:      Physical Exam   Constitutional: She is oriented to person, place, and time. She appears well-developed and well-nourished. No distress.   HENT:   Head: Normocephalic and atraumatic.   Eyes: Pupils are equal, round, and reactive to light. No scleral icterus.   Neck: Normal range of motion. No thyromegaly present.   Cardiovascular: Normal rate and regular rhythm. Exam reveals no gallop and no friction rub.   Murmur (mild HSM murmur at apex) heard.  Laying flat w/o any diff   Pulmonary/Chest: Effort normal and breath sounds normal. No respiratory distress. She has no wheezes. She has no rales.   Abdominal: Soft. Bowel sounds are normal. She exhibits distension (moderate). She exhibits no mass. There  is no tenderness. There is no rebound and no guarding.   Musculoskeletal: Normal range of motion. She exhibits no edema or tenderness.   L arm fistula thrill normal, nontender     Lymphadenopathy:     She has no cervical adenopathy.   Neurological: She is alert and oriented to person, place, and time.   Skin: She is not diaphoretic.   Psychiatric: She has a normal mood and affect. Her speech is normal and behavior is normal. Cognition and memory are normal.       Assessment:       1. Renovascular hypertension        Plan:       Holly was seen today for follow-up.    Diagnoses and all orders for this visit:    Renovascular hypertension  -     carvedilol (COREG) 25 MG tablet; Take 1 tablet (25 mg total) by mouth 2 (two) times daily.  Her care has been limited by adherence, will send med in to her local pharmacy    Transitional Care Note    Family and/or Caretaker present at visit?  Yes.  Diagnostic tests reviewed/disposition: No diagnosic tests pending after this hospitalization.  Disease/illness education: provided  Home health/community services discussion/referrals: Patient does not have home health established from hospital visit.  They do not need home health.  If needed, we will set up home health for the patient.   Establishment or re-establishment of referral orders for community resources: No other necessary community resources.   Discussion with other health care providers: No discussion with other health care providers necessary.             Health Maintenance       Date Due Completion Date    TETANUS VACCINE 08/12/2013 8/12/2003    Pap Smear 03/28/2021 3/28/2018    Pneumococcal PPSV23 (High Risk) (2) 09/26/2023 9/26/2018      tdap today    Follow-up in about 3 months (around 1/12/2019).    Future Appointments   Date Time Provider Department Center   10/23/2018  3:00 PM Diana Berry NP [facility] LIVERTX [address]   10/24/2018  8:40 AM Michael Mendoza MD [facility] NEURO [address]   10/24/2018 11:00 AM Sandra  MD [facility] CARDIO [address]   10/26/2018  8:15 AM LAB, [facility] [place]   11/2/2018 10:30 AM TED Crouch MD [facility] OPHTHAL [address]   12/5/2018 10:00 AM Adriane Rosario MD [facility] OBGYNF [address]   12/5/2018 11:00 AM Lei Pinedo MD [facility] HEPAT [address]   1/14/2019 12:40 PM Stan Sosa MD [facility] IM [facility]

## 2018-10-17 NOTE — TELEPHONE ENCOUNTER
----- Message from Lei Pinedo MD sent at 10/16/2018  1:04 PM CDT -----  Yes please    ----- Message -----  From: Violeta Francis RN  Sent: 10/16/2018  10:50 AM  To: Lei Pinedo MD    When she was discharged, I was told she needs a repeat biopsy. Do you still want me to schedule a biopsy for her?  Violeta    ----- Message -----  From: Lei Pinedo MD  Sent: 10/15/2018   3:11 PM  To: [facility] Post-Liver Transplant Clinical    Results reviewed

## 2018-10-17 NOTE — TELEPHONE ENCOUNTER
Spoke with pt. Advised that she will need biopsy to check for chronic rejection. Discussed that she will need an ultrasound before we can scheduled biopsy. Pt requested dates of 10/23 or 10/24 since she will be here for existing appointments.

## 2018-10-23 PROBLEM — D64.9 ANEMIA: Status: RESOLVED | Noted: 2018-06-19 | Resolved: 2018-01-01

## 2018-10-23 PROBLEM — I10 HYPERTENSION: Status: RESOLVED | Noted: 2018-06-22 | Resolved: 2018-01-01

## 2018-10-23 NOTE — PROGRESS NOTES
Transplant Hepatology  Liver Transplant Recipient Follow-up    Transplant Date: 11/8/1992  UNOS Native Liver Dx: Primary Liver Malignancy: Hemangioendothelioma, Hemangiosarcoma, Angiosarcoma    Holly is here for follow up of her liver transplant, hospital follow up .    ORGAN: LIVER  Whole or Partial: whole liver  Donor Type:   CDC High Risk:   Donor CMV Status: Positive  Donor HCV Status:   Donor HBcAb:   Donor HBV VIDHI:   Donor HCV VIDHI:   Biliary Anastomosis:   Arterial Anatomy:   IVC reconstruction:   Portal vein status:     She has had the following complications since transplant: chronic rejection and suspected noncompliance with immunosuppression medication. The noted complications need to be addressed more thoroughly today.    Subjective:     Interval History: Holly was last seen on 3/2018 by Dr. Pinedo. 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 Current complaints include none. Currently in evaluation for kidney transplant. Chronic liver rejection in the past so also has plans upcoming for US today and liver biopsy in the near future to assess for liver fibrosis. Recent hospitalization for HTN emergency and here for hospital follow up     Was hospitalized 10/4/18 - 10/7/18 for hypertensive emergency  -- Admitted to MICU service initially where she was weaned off courtney gtt and several home medications resumed while others added (of note home Clonidine held). Her PO inpatient regimen consistent of: Carvedilol 25 mg BID, Hydralazine 100 mg BID, Ibersartan 300 mg daily, and Nifedipine 120 mg daily. On this regimen, her BP's remained at goal, aside from one elevated reading taken prior to AM medications 10/7. She was discharged on the above PO regimen, new prescriptions for BP meds filled at [facility] pharmacy and delivered to bedside  -- last hepatology note while inpatient:  LFTs slightly  "uptrend  Synthetic function intact   Prograf trough level is 4, Would aim for 7-8 given history of chronic rejection, previous notation of goal trough 3-5 ?   Recommendations:  Daily tacrolimus trough level  Continue tacrolimus dose at 6mg BID    She will need repeat liver biopsy to determine degree of fibrosis, as she has chronic rejection and likely will develop advanced fibrosis and may need consideration for repeat transplant (+ kidney), although her compliance will be a major barrier.    Of note, pt reports drainage from left fistula at dialysis today but unsure of details. Denies fever, chills, redness at site, drainage from site since or before, pain. Currently no redness, drainage at site.     Patient has repeated history of undetected prograf trough levels that trend up to normal when hospitalized and given similar or same dose of prograf compared to dose prescribed at home. Given history, questioned patient about compliance with prograf, as current prograf and multiple past prograf doses suggest that she is not taking Prograf as prescribed consistently at home. Last prograf dose undetectable, as often is on outpatient basis.     Before visit, called pts pharmacy ([facility] in [place]) and obtained the following refill information, confirming that patient does not refill her prograf on a regular basis. Pt denies filling medications with any other pharmacy in the past year. Denies taking any other medications at the same time as the Prograf that may interact with its absorption (including any OTC medications)  Prograf refill history:   ~2.5 month since last refill   8/16/18 450 pills, dose 6 mg BID  ~2.5 months  5/31/18 300 pills, dose 5 mg BID  ~ 3 months  3/1/18 300 pills, dose  5 mg bID   6.5 months  8/17/17 480 pills, dose 8 mg BID   (1 month)  6 weeks  6/30/2017 400 pills, dose 7 mg BID  (1 month)    When discussed above information, patient states "I don't know, I just take it". When inquired if it is " "possible that she misses doses, pt states "its possible, but not all the time or anything". Unsure of actual details of pt compliance given incomplete pt reporting and reporting not consistent with lab values and refill inquiry.     Hepatologist: Dr. Pinedo    Taking ASA 81 mg daily     History of rejection : yes, chronic  Current Immunosuppression: currently prescribed Prograf 6 mg BID, unsure of what dose pt is taking, as patient has not filled medication since 8/2018 per [facility], pt reports "I can't remember the last time I filled it, but I take it"    Liver allograft function: abnormal     Lab Results   Component Value Date    ALT 41 10/12/2018    AST 43 (H) 10/12/2018    ALKPHOS 795 (H) 10/12/2018    BILITOT 0.5 10/12/2018    ALBUMIN 3.2 (L) 10/12/2018    INR 1.2 10/12/2018    PLT 99 (L) 10/12/2018    CREATININE 6.3 (H) 10/12/2018    TACROLIMUS <1.5 (L) 10/12/2018     MELD-Na score: 22 at 10/12/2018 11:53 AM  MELD score: 22 at 10/12/2018 11:53 AM  Calculated from:  Serum Creatinine: 0  Serum Sodium: 139 mmol/L (Rounded to 137 mmol/L) at 10/12/2018 11:53 AM  Total Bilirubin: 0.5 mg/dL (Rounded to 1 mg/dL) at 10/12/2018 11:53 AM  INR(ratio): 1.2 at 10/12/2018 11:53 AM  Age: 28 years    Kidney function: ESRD on HD, followed by nephrology and currently in kidney transplant evaluation     Denies recurrent infections, cancer  HTN : at goal at visit today, on multiple anti-HTN medications    Last Fibroscan : none, upcoming biopsy       Review of Systems   Constitutional: Negative for activity change, appetite change, chills, fatigue, fever and unexpected weight change.   HENT: Negative.    Eyes: Negative.    Respiratory: Negative for cough and shortness of breath.    Cardiovascular: Negative for chest pain and leg swelling.   Gastrointestinal: Positive for abdominal distention (chronic). Negative for abdominal pain, constipation, diarrhea, nausea and vomiting.   Endocrine: Negative.    Genitourinary: Negative for " dysuria, flank pain and urgency.   Musculoskeletal: Negative.    Skin: Negative for color change, rash and wound.   Allergic/Immunologic: Positive for immunocompromised state.   Neurological: Negative for dizziness, weakness and headaches.   Hematological: Negative for adenopathy. Does not bruise/bleed easily.   Psychiatric/Behavioral: Negative.        Objective:     Physical Exam   Constitutional: She is oriented to person, place, and time.   Chronically ill-appearing.      HENT:   Head: Normocephalic and atraumatic.   Eyes: EOM are normal. Pupils are equal, round, and reactive to light. No scleral icterus.   Neck: Normal range of motion.   Cardiovascular: Regular rhythm and normal heart sounds.   No murmur heard.  + tachycardia   Pulmonary/Chest: Effort normal and breath sounds normal. She has no wheezes. She has no rales.   Abdominal: Soft. Bowel sounds are normal. She exhibits distension. She exhibits no mass. There is no tenderness. There is no rebound and no guarding.   Musculoskeletal: Normal range of motion. She exhibits no edema or tenderness.   Lymphadenopathy:     She has no cervical adenopathy.   Neurological: She is alert and oriented to person, place, and time.   Skin: Skin is warm and dry. Capillary refill takes less than 2 seconds. No rash noted. No erythema.   Left fistula site + thrill, + bruit. No redness, no drainage   Psychiatric: She has a normal mood and affect. Her behavior is normal.     Lab Results   Component Value Date    BILITOT 0.5 10/12/2018    AST 43 (H) 10/12/2018    ALT 41 10/12/2018    ALKPHOS 795 (H) 10/12/2018    CREATININE 6.3 (H) 10/12/2018    ALBUMIN 3.2 (L) 10/12/2018     Lab Results   Component Value Date    WBC 3.61 (L) 10/12/2018    HGB 11.2 (L) 10/12/2018    HCT 35.3 (L) 10/12/2018    HCT 25 (L) 09/18/2018    PLT 99 (L) 10/12/2018     Lab Results   Component Value Date    TACROLIMUS <1.5 (L) 10/12/2018       Assessment/Plan:     1. Liver replaced by transplant    2.  "Uncontrolled hypertension    3. Non compliance w medication regimen    4. Immunosuppressed    5. ESRD on hemodialysis      1. 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 Uncontrolled HTN, recent hospitalization   -- at goal today  -- upcoming appt with cardiology tomorrow    3. Immunosuppression  - currently prescribed Prograf 6 mg BID, unsure of what dose pt is taking, as patient has not filled medication since 8/[facility], pt reports "I can't remember the last time I filled it, but I take it"    4. Chronic rejection, likely due to non compliance  -- Patient has repeated history of undetected prograf trough levels that trend up to normal when hospitalized and given similar or same dose of prograf compared to dose prescribed at home. Given history, questioned patient about compliance with prograf, as current prograf and multiple past prograf doses suggest that she is not taking Prograf as prescribed consistently at home. Last prograf dose undetectable, as often is on outpatient basis.   --  Unsure of actual details of pt compliance given incomplete pt reporting and reporting not consistent with lab values and refill inquiry.     5. Drainage at fistula site?  -- Of note, pt reports drainage from left fistula at dialysis today but unsure of details. Denies fever, chills, redness at site, drainage from site since or before, pain. Currently no redness, drainage at site.     6. Health maintenance   -- yearly dermatology visit, overdue  -- DXA scan, overdue  -- Fibroscan yearly : cannot perform, upcoming biopsy     7. ESRD on HD  -- undergoing kidney transplant evaluation    8. Ascites  -- likely multifactorial, r/t fluid volume overload but will assess for advanced liver disease with upcoming biopsy     9. Tachycardia  -- asymptomatic. Denies fever, chills, cough, SOB, no drainage from fistula site or redness.   -- discussed low threshold for admission/ ER if patient has any of above, pt " verbalized understanding    EDUCATION:  Instructed pt to notify transplant team ASAP for any temp 100.4 or higher, any redness or drainage from skin (coy fistula site), increase in heart rate, dizziness, weakness, nausea or vomiting, fever, chills.     Reviewed with patient the  pharmacy record of refills with lack of refill for months so not possible for pt to be compliant with current medication doses. Encouraged pt to return home, check all pill bottles, and reassess current medication dosing, as it is not possible that she is taking Prograf as prescribed, illustrated by undetectable levels on labs. Discussed possibility of rejection, cirrhosis, liver failure, death due to non compliance with medication regimen       PLAN:  1. RTC as already scheduled with Dr. Pinedo, labs per protocol   2. US today, then needs biopsy scheduled to assess fibrosis given chronic rejection   3. Reiterated with patient importance of taking immunosuppression medication as prescribed.   4. Needs DEXA scan and derm eval for annual skin check     Note routed to post-liver transplant coordinator jaymie, Dr. Shahida Berry NP       UNOS Patient Status  Functional Status: 100% - Normal, no complaints, no evidence of disease  Physical Capacity: No Limitations

## 2018-10-23 NOTE — LETTER
October 23, 2018        Enrrique Moise  [address]  [facility]note 3[facility] [id number]  Phone: [phone]  Fax: [phone]             [facility]  [address]  [facility] [address]  Phone: [phone]   Patient: Holly Patel   MR Number: [id number]   YOB: 1990   Date of Visit: 10/23/2018       Dear Dr. Enrrique Moise    Thank you for referring Holly Patel to me for evaluation. Attached you will find relevant portions of my assessment and plan of care.    If you have questions, please do not hesitate to call me. I look forward to following Holly Patel along with you.    Sincerely,    Diana Berry NP    Enclosure    If you would like to receive this communication electronically, please contact [email or web address] or [phone] to request [facility] access.    [facility] is a tool which provides read-only access to select patient information with whom you have a relationship. Its easy to use and provides real time access to review your patients record including encounter summaries, notes, results, and demographic information.    If you feel you have received this communication in error or would no longer like to receive these types of communications, please e-mail [email or web address]

## 2018-10-24 NOTE — LETTER
October 24, 2018      Lei Pinedo MD  [address]  [place] [address]           [facility]  [address] [facility]  [place] [address]  Phone: [phone]  Fax: [phone]          Patient: Holly Patel   MR Number: [id number]   YOB: 1990   Date of Visit: 10/24/2018       Dear Dr. Lei Pinedo:    Thank you for referring Holly Patel to me for evaluation. Attached you will find relevant portions of my assessment and plan of care.    If you have questions, please do not hesitate to call me. I look forward to following Holly Patel along with you.    Sincerely,    Michael Mendoza MD    Enclosure  CC:  No Recipients    If you would like to receive this communication electronically, please contact [email or web address] or [phone] to request more information on [facility] access.    For providers and/or their staff who would like to refer a patient to [facility], please contact us through our one-stop-shop provider referral line, [facility], at [phone].    If you feel you have received this communication in error or would no longer like to receive these types of communications, please e-mail [email or web address]

## 2018-10-24 NOTE — PROGRESS NOTES
Chief Complaint   Patient presents with    Headache        Holly Patel is a 28 y.o. female with a history of multiple medical diagnoses as listed below that presents for follow-up after being hospitalized for chest pain.  The patient had seizures while she was in the hospital was consulted to neurology today.  Of note the patient had recently been in the hospital due to uncontrolled blood pressure and has several seizures.  The patient initially was started on Keppra 500 mg twice a day, but when she returned to the hospital for the chest pain episode she had another witnessed seizure there.  She was started on Vimpat at that time.  MRI brain was obtained that showed no acute findings, while there was some concern for vasculitis.  CTA head was obtained at that time which showed no acute changes and no signs of vasculitis on that imaging scan.  Once on Vimpat the patient and no further seizure activity while in the hospital.  She was tolerating the medications well at the time of discharge.  She is accompanied to this visit by her mother.  The patient currently lives alone, but her family member calls come throughout the day.  She has been tolerating Vimpat well without any seizures since she has been discharged from the hospital.    Interval History  07/02/2018  She presents to clinic for routine follow-up.  She says that she has not had any seizures since she was last seen in clinic.  Has been prescribed Keppra and Vimpat and has not had any seizures in the time that she was started on these medications.  She is accompanied to this visit by her mother who helps to provide the history.  She has been most trouble about her inability is dry due to her seizure precautions, but otherwise she has been tolerating her medications well without any complaints of side effects. She has family support that counseling causing checks on to make sure that she is doing well were regards to her health.  She has not had  any concerns about syncopal events in the time since her last clinic visit.    10/24/2018  She has been feeling better overall since he blood pressure has been better controlled. Medications have been taken as directed without any complaints of side effects. Driving was resumed at the end of August as discussed as she was six months seizure free at that point without any side effects. Headaches have seemed to go away in the time that her blood pressure has been well controlled. Today she is accompanied to this visit by her mother. No complaints at the time. She his being considered for kidney transplant, so she has been scheduled for a lot of doctor's appointments in the coming days.    PAST MEDICAL HISTORY:  Past Medical History:   Diagnosis Date    Anemia in ESRD (end-stage renal disease) 10/12/2015    dialysis tues, thursday, sat; access left arm    Chronic rejection of liver transplant 3/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 ()    Moderate protein-calorie malnutrition 2017    MRSA bacteremia 2017    Pneumonia     Prophylactic immunotherapy 2014    Renovascular hypertension 10/2/2015    Secondary hyperparathyroidism 2017    Seizures     Sialadenitis 3/21/2018    Thrombocytopenia 2016       PAST SURGICAL HISTORY:  Past Surgical History:   Procedure Laterality Date    BIOPSY-LIVER N/A 2017    Performed by [employer] OR 2ND FLR    BIOPSY-LIVER N/A 3/22/2016    Performed by [employer] OR 2ND FLR     SECTION      x 2     CONIZATION OF CERVIX USING LOOP ELECTROSURGICAL EXCISION PROCEDURE (LEEP) N/A 6/15/2018    Procedure: CONIZATION-CERVICAL-LEEP;  Surgeon: Neelam Marroquin MD;  Location: [facility] OR;  Service: OB/GYN;  Laterality: N/A;    CONIZATION-CERVICAL-LEEP N/A 6/15/2018    Performed by Neelam Marroquin MD at [facility] OR    [id number]; upper extremity Left 7/17/2015    Performed by [facility]alia [facility] at [facility] OR 2ND FLR    EMBOLIZATION N/A 7/21/2018    Procedure: EMBOLIZATION, BLOOD VESSEL;  Surgeon: Aren Ramos MD;  Location: [facility] CATH LAB;  Service: Radiology;  Laterality: N/A;    EMBOLIZATION, BLOOD VESSEL N/A 7/21/2018    Performed by Aren Ramos MD at [facility] CATH LAB    Exam Under Anesthesia N/A 8/8/2018    Per[facility] at [facility] OR 2ND FLR    Exam under anesthesia N/A 6/19/2018    Performed by [facility]am Marroquin MD at [facility] OR    Exam under anesthesia (ADD ON ) N/A 7/9/2018    Performed by NICKIE Alvarez MD at [facility] OR    Exam under anesthesia -cervical suturing  N/A 7/26/2018    Performed by Lei Sims III, MD at [facility] OR    EXAMINATION UNDER ANESTHESIA N/A 6/19/2018    Procedure: Exam under anesthesia;  Surgeon: Neelam Marroquin MD;  Location[facility];  Service: OB/GYN;  Laterality: N/A;    EXAMINATION UNDER ANESTHESIA N/A 7/9/2018    Procedure: Exam under anesthesia (ADD ON );  Surgeon: NICKIE Alvarez MD;  Location: [facility] OR;  Service: OB/GYN;  Laterality: N/A;  (ADD ON )    EXAMINATION UNDER ANESTHESIA N/A 7/26/2018    Procedure: Exam under anesthesia -cervical suturing ;  Surgeon: Lei Sims III, MD;  Location: [facility] OR;  Service: OB/GYN;  Laterality: N/A;    EXAMINATION UNDER ANESTHESIA N/A 8/8/2018    Procedure: Exam Under Anesthesia;  Surgeon: Neelam Marroquin MD;  Location: [facility] OR 2ND FLR;  Service: OB/GYN;  Laterality: N/A;  need endoloop  request 12 noon    FISTULOGRAM Left 12/4/2015    Performed by Idalia Diaz MD at  [facility] CATH LAB    LIVER BIOPSY      LIVER TRANSPLANT  09/1992    PERINEORRHAPHY  6/19/2018    Procedure: SUTURE REPAIR,CERVIX;  Surgeon: Neelam Marroquin MD;  Location: [facility] OR;  Service: OB/GYN;;    SUTURE REPAIR,CERVIX  6/19/2018    Performed [facility] at [facility] OR    TUBAL LIGATION  2010       SOCIAL HISTORY:  Social History     Socioeconomic History    Marital status: Legally      Spouse name: Not on file    Number of children: Not on file    Years of education: Not on file    Highest education level: Not on file   Social Needs    Financial resource strain: Not on file    Food insecurity - worry: Not on file    Food insecurity - inability: Not on file    Transportation needs - medical: Not on file    Transportation needs - non-medical: Not on file   Occupational History    Not on file   Tobacco Use    Smoking status: Never Smoker    Smokeless tobacco: Never Used   Substance and Sexual Activity    Alcohol use: No    Drug use: No    Sexual activity: No     Partners: Male   Other Topics Concern    Are you pregnant or think you may be? No    Breast-feeding No   Social History Narrative    Lives 2 kids, 7 and 8, and nephew. Her mom helps with kids.       FAMILY HISTORY:  Family History   Problem Relation Age of Onset    Hypertension Mother     Hypertension Father     Cancer Sister     Melanoma Neg Hx     Breast cancer Neg Hx     Colon cancer Neg Hx     Ovarian cancer Neg Hx        ALLERGIES AND MEDICATIONS: updated and reviewed.  Review of patient's allergies indicates:   Allergen Reactions    Chloral hydrate      Other reaction(s): Hallucinations  Other reaction(s): Hives    Hydrocodone Other (See Comments)     Mental status changes     Current Outpatient Medications   Medication Sig Dispense Refill    aspirin 81 MG Chew Take 1 tablet (81 mg total) by mouth once daily.  0    carvedilol (COREG) 25 MG tablet Take 1 tablet (25 mg total) by mouth 2 (two) times  daily. 60 tablet 11    cinacalcet (SENSIPAR) 30 MG Tab Take 1 tablet (30 mg total) by mouth daily with breakfast. (Patient taking differently: Take 30 mg by mouth daily with breakfast. On Dialysis days) 30 tablet 2    citalopram (CELEXA) 20 MG tablet TAKE 1 TABLET BY MOUTH EVERY DAY 30 tablet 1    famotidine (PEPCID) 40 MG tablet Take 0.5 tablets (20 mg total) by mouth once daily. 15 tablet 11    hydrALAZINE (APRESOLINE) 100 MG tablet Take 1 tablet (100 mg total) by mouth every 12 (twelve) hours. 60 tablet 1    irbesartan (AVAPRO) 300 MG tablet Take 1 tablet (300 mg total) by mouth once daily. 30 tablet 1    lacosamide (VIMPAT) 50 mg Tab Take 1 tablet (50 mg total) by mouth 2 (two) times daily. 60 tablet 11    levETIRAcetam (KEPPRA) 500 MG Tab Take 1 tablet (500 mg total) by mouth 2 (two) times daily. 60 tablet 11    minoxidil (LONITEN) 2.5 MG tablet Take 2 tablets (5 mg total) by mouth 2 (two) times daily. 120 tablet 1    NIFEdipine (PROCARDIA-XL) 60 MG (OSM) 24 hr tablet Take 2 tablets (120 mg total) by mouth once daily. 60 tablet 1    ondansetron (ZOFRAN) 4 MG tablet Take 1 tablet (4 mg total) by mouth every 6 (six) hours as needed for Nausea. (Patient taking differently: Take 4 mg by mouth once daily. ) 15 tablet 0    tacrolimus (PROGRAF) 1 MG Cap Take 6 capsules (6mg) in the morning and 6 capsules (6mg) in the evening 450 capsule 0    triamcinolone acetonide 0.1% (KENALOG) 0.1 % ointment AAA on arms, legs, and neck bid x 1-2 wks then prn flares only 80 g 3     No current facility-administered medications for this visit.        Review of Systems   Constitutional: Negative for activity change, appetite change, fever and unexpected weight change.   HENT: Negative for trouble swallowing and voice change.    Eyes: Negative for photophobia and visual disturbance.   Respiratory: Negative for apnea and shortness of breath.    Cardiovascular: Negative for chest pain and leg swelling.   Gastrointestinal:  Negative for constipation and nausea.   Genitourinary: Negative for difficulty urinating.   Musculoskeletal: Negative for back pain, gait problem and neck pain.   Skin: Negative for color change and pallor.   Neurological: Positive for seizures. Negative for dizziness, syncope, weakness and numbness.   Hematological: Negative for adenopathy.   Psychiatric/Behavioral: Negative for agitation, confusion and decreased concentration.       Neurologic Exam     Mental Status   Oriented to person, place, and time.   Registration: recalls 3 of 3 objects.   Attention: normal. Concentration: normal.   Speech: speech is normal   Level of consciousness: alert  Knowledge: good.     Cranial Nerves     CN II   Visual fields full to confrontation.   Right visual field deficit: none  Left visual field deficit: none     CN III, IV, VI   Pupils are equal, round, and reactive to light.  Extraocular motions are normal.   Right pupil: Size: 3 mm. Shape: regular. Accommodation: intact.   Left pupil: Size: 3 mm. Shape: regular. Accommodation: intact.   CN III: no CN III palsy  CN VI: no CN VI palsy  Nystagmus: none   Diplopia: none  Ophthalmoparesis: none  Upgaze: normal  Downgaze: normal  Conjugate gaze: present    CN V   Facial sensation intact.   Right facial sensation deficit: none  Left facial sensation deficit: none    CN VII   Facial expression full, symmetric.   Right facial weakness: none  Left facial weakness: none    CN VIII   CN VIII normal.     CN IX, X   CN IX normal.   CN X normal.   Palate: symmetric    CN XI   CN XI normal.   Right sternocleidomastoid strength: normal  Left sternocleidomastoid strength: normal  Right trapezius strength: normal  Left trapezius strength: normal    CN XII   CN XII normal.   Tongue deviation: none    Motor Exam   Muscle bulk: normal  Overall muscle tone: normal  Right arm tone: normal  Left arm tone: normal  Right leg tone: normal  Left leg tone: normal    Strength   Strength 5/5 throughout.      Sensory Exam   Right arm light touch: normal  Left arm light touch: normal  Right leg light touch: normal  Left leg light touch: normal  Right arm vibration: normal  Left arm vibration: normal  Right leg vibration: normal  Left leg vibration: normal  Right arm proprioception: normal  Left arm proprioception: normal  Right leg proprioception: normal  Left leg proprioception: normal  Right arm pinprick: normal  Left arm pinprick: normal  Right leg pinprick: normal  Left leg pinprick: normal    Gait, Coordination, and Reflexes     Gait  Gait: normal    Coordination   Romberg: negative  Finger to nose coordination: normal  Heel to shin coordination: normal  Tandem walking coordination: normal    Tremor   Resting tremor: absent    Reflexes   Right brachioradialis: 2+  Left brachioradialis: 2+  Right biceps: 2+  Left biceps: 2+  Right triceps: 2+  Left triceps: 2+  Right patellar: 2+  Left patellar: 2+  Right achilles: 2+  Left achilles: 2+  Right plantar: normal  Left plantar: normal      Physical Exam   Constitutional: She is oriented to person, place, and time. She appears well-developed and well-nourished.   HENT:   Head: Normocephalic and atraumatic.   Eyes: EOM are normal. Pupils are equal, round, and reactive to light.   Neck: Normal range of motion.   Cardiovascular: Normal rate and intact distal pulses.   Pulmonary/Chest: Effort normal. No apnea. No respiratory distress.   Musculoskeletal: Normal range of motion.   Neurological: She is alert and oriented to person, place, and time. She has normal strength. She has a normal Finger-Nose-Finger Test, a normal Heel to Shin Test, a normal Romberg Test and a normal Tandem Gait Test. Gait normal.   Reflex Scores:       Tricep reflexes are 2+ on the right side and 2+ on the left side.       Bicep reflexes are 2+ on the right side and 2+ on the left side.       Brachioradialis reflexes are 2+ on the right side and 2+ on the left side.       Patellar reflexes are 2+ on  "the right side and 2+ on the left side.       Achilles reflexes are 2+ on the right side and 2+ on the left side.  Skin: Skin is warm and dry.   Psychiatric: She has a normal mood and affect. Her speech is normal and behavior is normal. Thought content normal.   Vitals reviewed.      Vitals:    10/24/18 0842   BP: (!) 111/53   BP Location: Right arm   Patient Position: Sitting   BP Method: Small (Automatic)   Pulse: 91   Weight: 51.7 kg (113 lb 15.7 oz)   Height: 5' 5" (1.651 m)       Assessment & Plan:    Problem List Items Addressed This Visit     Acute nonintractable headache    Overview     Headaches were likely related to her blood pressures. She has had none in the last few months since she has had better control of the blood pressure.         Seizures - Primary    Overview     No seizures in about 8 months. Episodes were likely provoked in the setting of acute illness. She should continue AED medications as long as they are well tolerated and causing no side effects until she is seizure free about one year, then at that time we can discuss weaning medications if she chooses.         Current Assessment & Plan     Repeat EEG before she returns to clinic in 6 months.         Relevant Medications    levETIRAcetam (KEPPRA) 500 MG Tab    lacosamide (VIMPAT) 50 mg Tab    Other Relevant Orders    EEG,w/awake & drowsy record          Follow-up: Follow-up in about 6 months (around 4/24/2019).  More than 50% of this 25 minute encounter was spent in counseling and coordinating care of her seizure.      "

## 2018-10-24 NOTE — LETTER
October 24, 2018      Fidelia Naqvi, ROXIE  [address]  [facility]  1st Floor Clinic  [facility] [address]           [facility]  [address] [facility] [address]  Phone: [phone]          Patient: Holly Patel   MR Number: [id number]   YOB: 1990   Date of Visit: 10/24/2018       Dear Fidelia Naqvi:    Thank you for referring Holly Patel to me for evaluation. Attached you will find relevant portions of my assessment and plan of care.    If you have questions, please do not hesitate to call me. I look forward to following Holly Patel along with you.    Sincerely,    Sandra Magallon MD    Enclosure  CC:  No Recipients    If you would like to receive this communication electronically, please contact [email or web address] or [phone] to request more information on [employer] access.    For providers and/or their staff who would like to refer a patient to [facility], please contact us through our one-stop-shop provider referral line, [facility], at [phone].    If you feel you have received this communication in error or would no longer like to receive these types of communications, please e-mail [email or web address]

## 2018-10-24 NOTE — PROGRESS NOTES
"Subjective:   Patient ID:  Holly Patel is a 28 y.o. female is a new patient who presents for evaluation of Hypertension; Kidney Transplant Evaluation; and Shortness of Breath    Preoperative clearance for kidney transplant, ESRD, HTN, liver transplant in 2012     HPI:   HTN now better controlled with changes in meds,  Does not exercise.   Significant MENDOSA.   No chest pain, Orthopnea, PND of heart failure symptoms.   Occasional palpitations.     EKG: SR with LVH.     Patient Active Problem List   Diagnosis   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    Prophylactic immunotherapy    Acute nonintractable headache    ESRD on hemodialysis    Renovascular hypertension    Acute blood loss anemia    Anemia in ESRD (end-stage renal disease)    Non compliance w medication regimen    Immunosuppressed    Secondary hyperparathyroidism    Iron deficiency anemia secondary to inadequate dietary iron intake    Moderate protein-calorie malnutrition    Uncontrolled hypertension    Seizures    Hypervolemia    Weight loss, unintentional    Thrombocytopenia    Leukopenia    Chronic fatigue    Anemia of unknown etiology    Depression    Symptomatic anemia    Vaginal atrophy with desquamative inflammatory vaginitis    Hyponatremia    Anemia of chronic kidney failure, stage 5    Hypertensive retinopathy of both eyes, grade 3    Elevated PTHrP level    Chronic kidney disease-mineral and bone disorder     /63 (BP Location: Right arm, Patient Position: Sitting, BP Method: Small (Manual))   Pulse 95   Ht 5' 5" (1.651 m)   Wt 53.1 kg (117 lb 1 oz)   SpO2 100%   BMI 19.48 kg/m²   Body mass index is 19.48 kg/m².  CrCl cannot be calculated (Patient's most recent lab result is older than the maximum 7 days allowed.).    Lab Results   Component Value Date     10/12/2018    K 3.9 10/12/2018     10/12/2018    CO2 29 10/12/2018    BUN 22 (H) 10/12/2018    CREATININE 6.3 (H) 10/12/2018    GLU 93 10/12/2018 "    HGBA1C 4.1 09/20/2018    MG 2.3 10/07/2018    AST 43 (H) 10/12/2018    ALT 41 10/12/2018    ALBUMIN 3.2 (L) 10/12/2018    PROT 8.4 10/12/2018    BILITOT 0.5 10/12/2018    WBC 3.61 (L) 10/12/2018    HGB 11.2 (L) 10/12/2018    HCT 35.3 (L) 10/12/2018    HCT 25 (L) 09/18/2018    MCV 88 10/12/2018    PLT 99 (L) 10/12/2018    INR 1.2 10/12/2018    TSH 1.150 09/18/2018    CHOL 215 (H) 09/26/2018    HDL 67 09/26/2018    LDLCALC 137.6 09/26/2018    TRIG 52 09/26/2018       Current Outpatient Medications   Medication Sig    aspirin 81 MG Chew Take 1 tablet (81 mg total) by mouth once daily.    carvedilol (COREG) 25 MG tablet Take 1 tablet (25 mg total) by mouth 2 (two) times daily.    cinacalcet (SENSIPAR) 30 MG Tab Take 1 tablet (30 mg total) by mouth daily with breakfast. (Patient taking differently: Take 30 mg by mouth daily with breakfast. On Dialysis days)    citalopram (CELEXA) 20 MG tablet TAKE 1 TABLET BY MOUTH EVERY DAY    famotidine (PEPCID) 40 MG tablet Take 0.5 tablets (20 mg total) by mouth once daily.    hydrALAZINE (APRESOLINE) 100 MG tablet Take 1 tablet (100 mg total) by mouth every 12 (twelve) hours.    irbesartan (AVAPRO) 300 MG tablet Take 1 tablet (300 mg total) by mouth once daily.    lacosamide (VIMPAT) 50 mg Tab Take 1 tablet (50 mg total) by mouth 2 (two) times daily.    levETIRAcetam (KEPPRA) 500 MG Tab Take 1 tablet (500 mg total) by mouth 2 (two) times daily.    minoxidil (LONITEN) 2.5 MG tablet Take 2 tablets (5 mg total) by mouth 2 (two) times daily.    NIFEdipine (PROCARDIA-XL) 60 MG (OSM) 24 hr tablet Take 2 tablets (120 mg total) by mouth once daily.    ondansetron (ZOFRAN) 4 MG tablet Take 1 tablet (4 mg total) by mouth every 6 (six) hours as needed for Nausea. (Patient taking differently: Take 4 mg by mouth once daily. )    tacrolimus (PROGRAF) 1 MG Cap Take 6 capsules (6mg) in the morning and 6 capsules (6mg) in the evening    triamcinolone acetonide 0.1% (KENALOG) 0.1 %  ointment AAA on arms, legs, and neck bid x 1-2 wks then prn flares only     No current facility-administered medications for this visit.        Review of Systems   Constitution: Positive for weakness and malaise/fatigue. Negative for chills, decreased appetite, night sweats, weight gain and weight loss.   Eyes: Negative for blurred vision, double vision, visual disturbance and visual halos.   Cardiovascular: Positive for dyspnea on exertion. Negative for chest pain, claudication, cyanosis, irregular heartbeat, leg swelling, near-syncope, orthopnea, palpitations, paroxysmal nocturnal dyspnea and syncope.   Respiratory: Positive for shortness of breath. Negative for cough, hemoptysis, snoring, sputum production and wheezing.    Endocrine: Negative for cold intolerance, heat intolerance, polydipsia and polyphagia.   Hematologic/Lymphatic: Negative for adenopathy and bleeding problem. Does not bruise/bleed easily.   Skin: Negative for flushing, itching, poor wound healing and rash.   Musculoskeletal: Negative for arthritis, back pain, falls, gout, joint pain, joint swelling, muscle cramps, muscle weakness, myalgias, neck pain and stiffness.   Gastrointestinal: Negative for bloating, abdominal pain, anorexia, diarrhea, dysphagia, excessive appetite, flatus, hematemesis, jaundice, melena and nausea.   Genitourinary: Negative for hesitancy and incomplete emptying.   Neurological: Negative for aphonia, brief paralysis, difficulty with concentration, disturbances in coordination, excessive daytime sleepiness, dizziness, focal weakness, light-headedness and loss of balance.   Psychiatric/Behavioral: Negative for altered mental status, depression, hallucinations, hypervigilance, memory loss, substance abuse and suicidal ideas. The patient does not have insomnia and is not nervous/anxious.        Objective:   Physical Exam   Constitutional: She is oriented to person, place, and time. She appears well-developed and  well-nourished. No distress.   HENT:   Head: Normocephalic and atraumatic.   Nose: Nose normal.   Mouth/Throat: Oropharynx is clear and moist. No oropharyngeal exudate.   Eyes: Conjunctivae and EOM are normal. Pupils are equal, round, and reactive to light. Right eye exhibits no discharge. Left eye exhibits no discharge. No scleral icterus.   Neck: Normal range of motion. Neck supple. No JVD present. No tracheal deviation present. No thyromegaly present.   Cardiovascular: Normal rate, regular rhythm, normal heart sounds and intact distal pulses. Exam reveals no gallop and no friction rub.   No murmur heard.  Pulmonary/Chest: Effort normal and breath sounds normal. No stridor. No respiratory distress. She has no wheezes. She has no rales. She exhibits no tenderness.   Abdominal: Soft. Bowel sounds are normal. She exhibits distension (positive fluid thrill\). She exhibits no mass. There is no tenderness. There is no rebound and no guarding.   Musculoskeletal: Normal range of motion. She exhibits no edema or tenderness.   Lymphadenopathy:     She has no cervical adenopathy.   Neurological: She is alert and oriented to person, place, and time. She has normal reflexes. No cranial nerve deficit. She exhibits normal muscle tone. Coordination normal.   Skin: Skin is warm. No rash noted. She is not diaphoretic. No erythema. No pallor.   Psychiatric: She has a normal mood and affect. Her behavior is normal. Judgment and thought content normal.       Assessment:     1. Preoperative clearance    2. ESRD on hemodialysis    3. Liver replaced by transplant    4. Uncontrolled hypertension    5. Renovascular hypertension    6. Anemia in ESRD (end-stage renal disease)        Plan:   Holly was seen today for hypertension, kidney transplant evaluation and shortness of breath.    Diagnoses and all orders for this visit:    Preoperative clearance  -     Pharmacological stress test w/ Color Darren; Future    ESRD on hemodialysis    Liver  replaced by transplant    Uncontrolled hypertension    Renovascular hypertension    Anemia in ESRD (end-stage renal disease)      Overall lower risk for CAD, given age but with prior h/o of liver dz. and LVH on ekg, DSE is reasonable. Dietary modification for high cholesterol, PCP to consider statin if no improvement (patient understands).  Counseled on importance of heart healthy diet low in saturated and trans fat and salt as well gradually starting a regular aerobic exercise regimen with goal of 30min 5x/week. Recommend BP diary. Call if systolic BP > 130 mmHg on checking repeatedly

## 2018-10-30 NOTE — TELEPHONE ENCOUNTER
Per Dr. Pinedo, pt to have TJ liver biopsy to assess for chronic rejection.    Called pt. Confirmed HS schedule remains Tu/Th/Sat 0600 to 1030.  She is agreeable to TJ liver biopsy.    TJ liver biopsy scheduled for Wednesday, 11/7 at 9:30. Pt agreed with time and date.  Pt states she may have parathyroid removed Monday, 11/5/18. If she does, she will call me so biopsy can be rescheduled.

## 2018-10-30 NOTE — TELEPHONE ENCOUNTER
DSE is negative, patient is moderate risk of perioperative morbidity and mortality given her risk factors and can proceed with non cardiac surgery.

## 2018-11-02 NOTE — LETTER
November 2, 2018      Gloria Juares MD  [address]  [place] [address]           [facility]  [address]  [facility] [address]  Phone: [phone]  Fax: [phone]          Patient: Holly Patel   MR Number: [id number]   YOB: 1990   Date of Visit: 11/2/2018       Dear Dr. Gloria Juares:    Thank you for referring Holly Patel to me for evaluation. Attached you will find relevant portions of my assessment and plan of care.    If you have questions, please do not hesitate to call me. I look forward to following Holly Patel along with you.    Sincerely,    TED Crouch MD    Enclosure  CC:  No Recipients    If you would like to receive this communication electronically, please contact [email or web address] or [phone] to request more information on [employer] access.    For providers and/or their staff who would like to refer a patient to [facility], please contact us through our one-stop-shop provider referral line, [facility], at [phone].    If you feel you have received this communication in error or would no longer like to receive these types of communications, please e-mail [email or web address]

## 2018-11-02 NOTE — PROGRESS NOTES
HPI     Referred by Dr Mor Medina for HTN Retinopathy     Pt noticed that VA is better since BP has gone down   -eye pain  -flashes or floaters       Gtts None       Last edited by Piper Winslow on 11/2/2018 11:03 AM. (History)      OCT - OD HE - minmal ME  OS - HE, ME improved and SRF resolved    A/P    1. HTN Ret OU  Significant elevation, though improving  SRF OS resolved  A lot of HE OU, will follow    BP control    2. 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 [place] renal failure  On HD      4 months OCT/[place] FA OS - ok to use oral fluorescein if difficult stick

## 2018-11-05 NOTE — TELEPHONE ENCOUNTER
----- Message from Emily Guzmán sent at 11/5/2018 11:12 AM CST -----  Contact: Patient  Needs Advice    Reason for call: patient wants to reschedule BIOPSY, LIVER, TRANSJUGULAR APPROACH procedure on 11/7.          Communication Preference: [phone]    Additional Information: n/a

## 2018-11-05 NOTE — TELEPHONE ENCOUNTER
Returned call to pt. Spoke with pt. Advised that TJ liver biopsy rescheduled to 11/16/18 at 11:30.  Pt agreed with time and date.  She understands to fast after midnight and that she will need to take medications with a small sip of water prior to biopsy.

## 2018-11-05 NOTE — PROGRESS NOTES
Consult Note  Endocrine Surgery    Visit Diagnosis: Hyperparathyroidism [E21.3]    SUBJECTIVE:     Patient is a 28 y.o. female who was referred by Dr. Vitkor Bass and is here with mother  and presents for evaluation of secondary hyperparathyroidism.   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  Per their report the patient was noted to have severe HTN resulting in ESRD starting on HD in 2015. Currently undergoes HD days MWF at [facility].  The patient has had complaints of hyperparathyroidism. There is no history of lithium or thiazide medication use. She has not had previous history of head or neck radiation. She denies sleep disturbance, joint pain, abdominal pain, weakness and increased thirst. Furthermore, there is no history of pathologic fractures, malignancy, or personal history of thyroid, adrenal, or pancreatic abnormalities. Unknown current Vitamin D status. She does not have familial history of endocrinopathies.    Takes Sensipar 30mg daily on .  No dose change following recent lab(PTH) draw.      Review of patient's allergies indicates:   Allergen Reactions    Chloral hydrate      Other reaction(s): Hallucinations  Other reaction(s): Hives    Hydrocodone Other (See Comments)     Mental status changes       Current Outpatient Medications   Medication Sig Dispense Refill    aspirin 81 MG Chew Take 1 tablet (81 mg total) by mouth once daily.  0    carvedilol (COREG) 25 MG tablet Take 1 tablet (25 mg total) by mouth 2 (two) times daily. 60 tablet 11    cinacalcet (SENSIPAR) 30 MG Tab Take 1 tablet (30 mg total) by mouth daily with breakfast. (Patient taking differently: Take 30 mg by mouth daily with breakfast. On Dialysis days) 30 tablet 2    citalopram (CELEXA) 20 MG tablet TAKE 1 TABLET BY MOUTH EVERY DAY 30 tablet 1    famotidine (PEPCID) 40 MG tablet Take 0.5 tablets (20 mg total) by mouth once daily. 15 tablet 11    hydrALAZINE  (APRESOLINE) 100 MG tablet Take 1 tablet (100 mg total) by mouth every 12 (twelve) hours. 60 tablet 1    irbesartan (AVAPRO) 300 MG tablet Take 1 tablet (300 mg total) by mouth once daily. 30 tablet 1    lacosamide (VIMPAT) 50 mg Tab Take 1 tablet (50 mg total) by mouth 2 (two) times daily. 60 tablet 11    levETIRAcetam (KEPPRA) 500 MG Tab Take 1 tablet (500 mg total) by mouth 2 (two) times daily. 60 tablet 11    minoxidil (LONITEN) 2.5 MG tablet Take 2 tablets (5 mg total) by mouth 2 (two) times daily. 120 tablet 1    NIFEdipine (PROCARDIA-XL) 60 MG (OSM) 24 hr tablet Take 2 tablets (120 mg total) by mouth once daily. 60 tablet 1    ondansetron (ZOFRAN) 4 MG tablet Take 1 tablet (4 mg total) by mouth every 6 (six) hours as needed for Nausea. (Patient taking differently: Take 4 mg by mouth once daily. ) 15 tablet 0    tacrolimus (PROGRAF) 1 MG Cap Take 6 capsules (6mg) in the morning and 6 capsules (6mg) in the evening 450 capsule 0    triamcinolone acetonide 0.1% (KENALOG) 0.1 % ointment AAA on arms, legs, and neck bid x 1-2 wks then prn flares only 80 g 3     No current facility-administered medications for this visit.        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)    Moderate protein-calorie malnutrition 8/16/2017    MRSA bacteremia 8/6/2017    Pneumonia     Prophylactic immunotherapy 8/4/2014    Renovascular  hypertension 10/2/2015    Secondary hyperparathyroidism 2017    Seizures     Sialadenitis 3/21/2018    Thrombocytopenia 2016     Past Surgical History:   Procedure Laterality Date    BIOPSY-LIVER N/A 2017    Performed by [facility] 2ND FLR    BIOPSY-LIVER N/A 3/22/2016    Performed by [facility] 2ND FLR     SECTION      x 2    CONIZATION OF CERVIX USING LOOP ELECTROSURGICAL EXCISION PROCEDURE (LEEP) N/A 6/15/2018    Procedure: CONIZATION-CERVICAL-LEEP;  Surgeon: Neelam Marroquin MD;  Location: [facility] OR;  Service: OB/GYN;  Laterality: N/A;    CONIZATION-CERVICAL-LEEP N/A 6/15/2018    Performed by [facility]am Marroquin MD at [facility] OR    [id number]; upper extremity Left 2015    Performed by Idalia Diaz MD at [facility] OR 2ND FLR    EMBOLIZATION N/A 2018    Procedure: EMBOLIZATION, BLOOD VESSEL;  Surgeon: Aren Ramos MD;  Location: [facility] CATH LAB;  Service: Radiology;  Laterality: N/A;    EMBOLIZATION, BLOOD VESSEL N/A 2018    Performed by Aren Ramos MD at [facility] CATH LAB    Exam Under Anesthesia N/A 2018    Performed by [facility]quin [facility] at [facility] OR 2ND FLR    Exam under anesthesia N/A 2018    Performed by [facility]am [facility]roquin MD at [facility] OR    Exam under anesthesia (ADD ON ) N/A 2018    Performed by NICKIE Alvarez MD at [facility] OR    Exam under anesthesia -cervical suturing  N/A 2018    Performed by Lei Sims III, MD at [facility] OR    EXAMINATION UNDER ANESTHESIA N/A 2018    Procedure: Exam under anesthesia;  Surgeon: Neelam Marroquin MD;  Location: [facility] OR;  Service: OB/GYN;  Laterality: N/A;    EXAMINATION UNDER ANESTHESIA N/A 2018    Procedure: Exam under anesthesia (ADD ON );  Surgeon: NICKIE Alvarez MD;  Location: [facility] OR;  Service: OB/GYN;  Laterality: N/A;  (ADD ON )    EXAMINATION UNDER ANESTHESIA N/A 2018    Procedure: Exam under  "anesthesia -cervical suturing ;  Surgeon: Lei Sims III, MD;  Location: [facility] OR;  Service: OB/GYN;  Laterality: N/A;    EXAMINATION UNDER ANESTHESIA N/A 8/8/2018    Procedure: Exam Under Anesthesia;  Surgeon: Neelam Marroquin MD;  Location: [facility] OR 2ND FLR;  Service: OB/GYN;  Laterality: N/A;  need endoloop  request 12 noon    FISTULOGRAM Left 12/4/2015    Performed by Idalia Diaz MD at [facility] CATH LAB    LIVER BIOPSY      LIVER TRANSPLANT  09/1992    PERINEORRHAPHY  6/19/2018    Procedure: SUTURE REPAIR,CERVIX;  Surgeon: Neelam Marroquin MD;  Location: [facility] OR;  Service: OB/GYN;;    SUTURE REPAIR,CERVIX  6/19/2018    Performed by [facility]am [facility]quin MD at [facility] OR    TUBAL LIGATION  2010     Social History     Tobacco Use    Smoking status: Never Smoker    Smokeless tobacco: Never Used   Substance Use Topics    Alcohol use: No    Drug use: No          Review of Systems:    Review of Systems   Constitutional: negative for chills, fatigue, fevers, malaise and night sweats  Eyes: negative for icterus and irritation  Respiratory: negative for cough and dyspnea on exertion  Cardiovascular: negative for chest pain and palpitations  Gastrointestinal: negative for constipation, diarrhea and dysphagia  Genitourinary:negative for dysuria, hematuria and nocturia  Integument/breast: negative for rash and skin color change  Hematologic/lymphatic: negative for bleeding and easy bruising  Neurological: negative for gait problems, headaches and seizures  Behavioral/Psych: negative for anxiety and depression  Endocrine: negative    ENT ROS: negative  Endocrine ROS:   negative for - hair pattern changes, malaise/lethargy, mood swings, palpitations or polydipsia/polyuria      OBJECTIVE:     Vital Signs:  BP (!) 157/93   Pulse 86   Temp 97.3 °F (36.3 °C)   Resp 18   Ht 5' 5" (1.651 m)   Wt 53 kg (116 lb 13.5 oz)   BMI 19.44 kg/m²    Body mass index is 19.44 kg/m².      Physical Exam    General:  no " distress, see vitals for BMI    Eyes:  conjunctivae/corneas clear   Neck: trachea midline and symmetric, no adenopathy    Thyroid:  thyroid not enlarged   Lung: clear to auscultation bilaterally   Heart:  regular rate and rhythm   Abdomen: soft, non-tender; bowel sounds normal; no masses,  no organomegaly   Skin/Extremities: warm and well-perfused   Pulses: 2+ and symmetric   Neuro: normal without focal findings and mental status, speech normal, alert and oriented x3       Imaging    Studies:  Date:       Results:     DEXA:   11/5/2018 Spine T Score: -1.7, Hip T Score: -2.1,   Femoral neck T Score: -1.9(left), Distal radius (Forearm) T Score: -4.1(right)   Ultrasound:  11/5/2018 The right lobe of the thyroid gland measures 4.5 x 1.8 x 1.9 cm and the left lobe of the thyroid gland measures 4.6 x 1.7 x 1.4 cm.  The isthmus measures 0.3 cm in thickness.  There are hypoechoic nodules posterior to the cephalad aspects of the thyroid gland measuring 1.6 x 0.4 cm on the right and 0.9 x 0.6 cm on the left likely representing bilateral parathyroid adenomas.  No cervical adenopathy is identified.       Lab Review        Component Value Date    Vit D, 25-Hydroxy 13 (L) 10/02/2015    PTH, Intact 1,429.0 (H) 09/26/2018    Calcium 8.1 (L) 10/26/2018    Phosphorus 4.6 (H) 10/07/2018    TSH 1.150 09/18/2018    Free T4 1.31 05/16/2018           ASSESSMENT/PLAN:       Assessment    Holly Patel is an 28 y.o. female who presents with secondary hyperparathyroidism, likely due to parathyroid hyperplasia. The above testing and examination support the diagnosis. There is a question as to whether the patient will be able to be listed for a transplant(kidney).   She is currently not on maximal medical treatment currently.      Plan    1. Imaging: will  consider a Sestamebi scan if determined that parathyroid surgery is indicated.  2. Medications: patient should continue current medications: Cinacalcet 3 times a week.  3. The  "natural history and treatment options for hyperparathyroidism were discussed with the patient. Parathyroidectomy is the only curative treatment for primary hyperparathyroidism. Parathyroidectomy, with standard four-gland exploration, and its risks were discussed. I was also able to discuss the expected no-operative course and the risks of surgery including failure to localize the parathyroid gland, persistent or recurrent hyperparathyroidism with the possibility of the need for a second surgery, temporary or permanent hypoparathyroidism resulting in low blood calcium levels that require extensive medication to avoid serious degenerative conditions such as cataracts, brittle bones, muscle weakness and muscle irritability. Other risks include bleeding, infection, injury to the recurrent laryngeal nerves resulting in hoarseness or impairment of speech and the risks of general anesthetic including MI, CVA, sudden death or even reaction to anesthetic medications.The role of intraoperative PTH monitoring was also discussed. The patient understands the risks, any and all questions were answered to the patients satisfaction. The patient is amenable to surgery.   4. Will ensure that patient is medically optimized prior to procedure. In addition, the patient was given our "Understanding Parathyroid Disease" booklet and directed to the [employer] Patient Education portal as a resource.   "

## 2018-11-05 NOTE — Clinical Note
November 6, 2018      Viktor Bass MD  [address]  [facility] [address]           [facility] [address]  [address] [facility], [address]  [facility] [address]  Phone: [phone]  Fax: [phone]          Patient: Holly Patel   MR Number: [id number]   YOB: 1990   Date of Visit: 11/5/2018       Dear Dr. Viktor Bass:    Thank you for referring Holly Patel to me for evaluation. Attached you will find relevant portions of my assessment and plan of care.    If you have questions, please do not hesitate to call me. I look forward to following Holly Patel along with you.    Sincerely,    Ashley Guallpa MD    Enclosure  CC:  No Recipients    If you would like to receive this communication electronically, please contact [email or web address] or [phone] to request more information on [employer] access.    For providers and/or their staff who would like to refer a patient to [facility], please contact us through our one-stop-shop provider referral line, [facility] [place], at [phone].    If you feel you have received this communication in error or would no longer like to receive these types of communications, please e-mail [email or web address]

## 2018-11-05 NOTE — LETTER
Endocrine/General Surgery  [address] [place] [address]  Phone: [phone]  Fax: [phone] November 6, 2018     Viktor Bass MD  [address]  [facility] [address]    Patient: Holly Patel   YOB: 1990   Date of Visit: 11/5/2018     Dear Dr. Bass:    I saw Ms. Patel in clinic. Attached you will find a copy of my note.    As you know, she is a 28 y.o. female who presented with secondary hyperparathyroidism. I was able to discuss the expected no-operative course if surgery is performed. The current plan includes determination if the surgery at this time will be indicated.    If you have any questions or concerns, please don't hesitate to contact my office. Again, thank you kindly for this referral.    Regards,    Ashley Guallpa MD

## 2018-11-07 NOTE — LETTER
November 7, 2018    Holly Patel  [address] [place] [id number]          Dear Holly Patel:  MRN: [id number]    Your lab results were stable.  There are no medicine changes.  Please have your labs drawn again on 11/26/18.      If you cannot have your labs drawn on the scheduled date, it is your responsibility to call the transplant department to reschedule.  To reschedule or make an appointment, please as to speak to or leave a message for my assistant, Jaki Chavis, at [phone].  When leaving a message for Palmira Pollack, or myself, we ask that you leave a brief message regarding your request.    Sincerely,    Violeta Francis, RN, BSN, [facility]  Liver Transplant Coordinator  [facility]  [address] [place] [address] [phone]

## 2018-11-07 NOTE — TELEPHONE ENCOUNTER
Results Call:   Called pt to advise of the following report per Dr. Winter:    Please inform the patient that her thyroid US did not demonstrate any nodules that would require biopsy at this time.  It did show two large likely parathyroid glands which is common in patients with renal failure.  Her bone density showed osteoporosis(bone lose) which can happen with renal failure and there resulting high PTH levels also.  I've messaged the transplant team and nephrology to make a final decision of if surgery would be best at this time.  We are awaiting the results of that.     philippe Omalley

## 2018-11-12 NOTE — TELEPHONE ENCOUNTER
----- Message from Christine Coello sent at 11/12/2018  2:42 PM CST -----  Calling to find out if she still has to do liver biopsy since most recent test results were stable    pls contact to advise    Pt contact [phone]

## 2018-11-12 NOTE — TELEPHONE ENCOUNTER
Spoke with pt, discussed why she needs biopsy - to assess for chronic rejection. Pt states she will have biopsy.

## 2018-11-15 NOTE — ED NOTES
Report and care from Miguelito RN. Pt. Currently lying in bed, eating chips per okay from provider, family at bs, no acute distress noted, voices no concerns, on cardiac and o2 monitor, call light within reach, will continue to monitor frequently.

## 2018-11-15 NOTE — ED TRIAGE NOTES
"Patient in from dialysis after having a seizure lasting 10 - 15 seconds. EMS reports that seizure was generalized and patient was awake, alert, and oriented on arrival. Patient has a history of seizures and has been compliant with medications. Only complaint of abdominal pain at this time. Patient did not receive full dialysis today, but has not missed an appointment recently. Patent reports it has been "a while" since her last seizure.   "

## 2018-11-15 NOTE — TELEPHONE ENCOUNTER
----- Message from Oriana Roa sent at 11/15/2018  2:16 PM CST -----  Contact: Patient  Needs Advice    Reason for call: Pt stated she had a seizure today and went to the ER    Pt would like to know if she still have to do the biopsy tomorrow?        Communication Preference: [phone]    Additional Information: N/A

## 2018-11-15 NOTE — ED PROVIDER NOTES
Encounter Date: 11/15/2018       History     Chief Complaint   Patient presents with    Seizures     800/2000 cc dialysis had seizure 10-15 sec generalized seizure. AAOx4.      Patient is a 28-year-old  female with a PMH of seizures compliant with Vimpat and Keppra, s/p liver transplant in 1992, on prograf, presents to the ED with a seizure lasting 10-15 seconds. Pt states she was receiving her usual dialysis (Tuesday, Thur., and Sat.) when she began to seize. Reports not taking her medication, vimpat and keppra, prior to being dialyzed and has not had a seizure in the last year. Currently, she states she does not feel like her usual self, but normally feels this way after her past seizures. No recently injury or trauma. Denies headache, fever, chills, sore throat, rhinorrhea, chest pain, cough, SOB, N/V, abdominal pain, diarrhea, blood in stool, dizziness, or feeling confused. She does not produce urine anymore. Only 800/2000cc were taken off at dialysis today.          Review of patient's allergies indicates:   Allergen Reactions    Chloral hydrate      Other reaction(s): Hallucinations  Other reaction(s): Hives    Hydrocodone Other (See Comments)     Mental status changes     Past Medical History:   Diagnosis Date    Anemia in ESRD (end-stage renal disease) 10/12/2015    dialysis tues, thursday, sat; access left arm    Chronic rejection of liver transplant 3/22/2016    Depression     Encounter for blood transfusion     ESRD on hemodialysis 9/30/2015    History of recent hospitalization 05/2018    pneumonia    History of splenomegaly 4/12/2016    Immunosuppressed 8/5/2017    Iron deficiency anemia secondary to inadequate dietary iron intake 8/16/2017    She receives IV iron periodically at the [facility].   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)    Moderate protein-calorie malnutrition 8/16/2017    MRSA bacteremia 8/6/2017    Pneumonia      Prophylactic immunotherapy 2014    Renovascular hypertension 10/2/2015    Secondary hyperparathyroidism 2017    Seizures     Sialadenitis 3/21/2018    Thrombocytopenia 2016     Past Surgical History:   Procedure Laterality Date    BIOPSY-LIVER N/A 2017    Performed by [facility] at [facility] OR 2ND FLR    BIOPSY-LIVER N/A 3/22/2016    Performed by [facility] OR 2ND FLR     SECTION      x 2    CONIZATION OF CERVIX USING LOOP ELECTROSURGICAL EXCISION PROCEDURE (LEEP) N/A 6/15/2018    Procedure: CONIZATION-CERVICAL-LEEP;  Surgeon: Neelam Marroquin MD;  Location: [facility] OR;  Service: OB/GYN;  Laterality: N/A;    CONIZATION-CERVICAL-LEEP N/A 6/15/2018    Performed by [facility]am Mar[facility]quin MD at [facility] OR    QAESAVEUMNTI-[id number]-AK; upper extremity Left 2015    Performed by Idalia Diaz MD at [facility] OR 2ND FLR    EMBOLIZATION N/A 2018    Procedure: EMBOLIZATION, BLOOD VESSEL;  Surgeon: Aren Ramos MD;  Location: [facility] CATH LAB;  Service: Radiology;  Laterality: N/A;    EMBOLIZATION, BLOOD VESSEL N/A 2018    Performed by Aren Ramos MD at [facility] CATH LAB    Exam Under Anesthesia N/A 2018    Performed by [facility]quin MD at [facility] OR 2ND FLR    Exam under anesthesia N/A 2018    Performed by [facility]quin MD at [facility] OR    Exam under anesthesia (ADD ON ) N/A 2018    Performed by NICKIE Alvarez MD at [facility] OR    Exam under anesthesia -cervical suturing  N/A 2018    Performed by Lei Sims III, MD at [facility] OR    EXAMINATION UNDER ANESTHESIA N/A 2018    Procedure: Exam under anesthesia;  Surgeon: Neelam Marroquin MD;  Location: [facility] OR;  Service: OB/GYN;  Laterality: N/A;    EXAMINATION UNDER ANESTHESIA N/A 2018    Procedure: Exam under anesthesia (ADD ON );  Surgeon: NICKIE Alvarez MD;  Location: [facility] OR;  Service: OB/GYN;  Laterality: N/A;  (ADD ON )    EXAMINATION  UNDER ANESTHESIA N/A 7/26/2018    Procedure: Exam under anesthesia -cervical suturing ;  Surgeon: Lei Sims III, MD;  Location: [facility] OR;  Service: OB/GYN;  Laterality: N/A;    EXAMINATION UNDER ANESTHESIA N/A 8/8/2018    Procedure: Exam Under Anesthesia;  Surgeon: Neelam Marroquin MD;  Location: [facility] OR 2ND FLR;  Service: OB/GYN;  Laterality: N/A;  need endoloop  request 12 noon    FISTULOGRAM Left 12/4/2015    Performed by Idalia Daiz MD at [facility] CATH LAB    LIVER BIOPSY      LIVER TRANSPLANT  09/1992    PERINEORRHAPHY  6/19/2018    Procedure: SUTURE REPAIR,CERVIX;  Surgeon: Neelam Marroquin MD;  Location: [facility] OR;  Service: OB/GYN;;    SUTURE REPAIR,CERVIX  6/19/2018    Performed by [facility]quin MD at [facility] OR    TUBAL LIGATION  2010     Family History   Problem Relation Age of Onset    Hypertension Mother     Hypertension Father     Cancer Sister     Heart attack Maternal Uncle     Melanoma Neg Hx     Breast cancer Neg Hx     Colon cancer Neg Hx     Ovarian cancer Neg Hx      Social History     Tobacco Use    Smoking status: Never Smoker    Smokeless tobacco: Never Used   Substance Use Topics    Alcohol use: No    Drug use: No     Review of Systems   Constitutional: Negative for activity change, chills, fever and unexpected weight change.   HENT: Negative for rhinorrhea and sore throat.    Eyes: Negative for pain and visual disturbance.   Respiratory: Negative for chest tightness and shortness of breath.    Cardiovascular: Negative for chest pain, palpitations and leg swelling.   Gastrointestinal: Negative for abdominal pain, diarrhea, nausea and vomiting.   Endocrine: Negative for polyphagia.   Genitourinary: Negative for flank pain and pelvic pain.        Anuria.    Musculoskeletal: Negative for arthralgias, gait problem and neck pain.   Skin: Negative for pallor and rash.   Neurological: Positive for seizures. Negative for dizziness, weakness and headaches.    Hematological: Does not bruise/bleed easily.   Psychiatric/Behavioral: Negative for confusion.       Physical Exam     Initial Vitals [11/15/18 0944]   BP Pulse Resp Temp SpO2   (!) 160/100 92 16 97.7 °F (36.5 °C) 99 %      MAP       --         Physical Exam    Constitutional: She appears well-developed and well-nourished. She is not diaphoretic.   Pleasant young female in NAD and nontoxic appearing resting comfortably in exam bed.   HENT:   Head: Normocephalic and atraumatic.   Eyes: EOM are normal.   Neck: Neck supple.   Cardiovascular: Normal rate, regular rhythm and intact distal pulses.   Murmur (previous documented during past office visits ) heard.   Systolic murmur is present.  Palpable thrill to left forearm.   Pulmonary/Chest: Breath sounds normal. No respiratory distress. She exhibits no tenderness.   Abdominal: Soft. There is no tenderness.   Musculoskeletal: She exhibits no tenderness.   Neurological: She is alert and oriented to person, place, and time. She has normal strength.   Normal finger-to-nose, rapid alternating hand movements, finger pincer movements, and heel-to-shin.  Answering questions in clear and full sentences.   Skin: Skin is warm and dry. No rash noted.         ED Course   Procedures  Labs Reviewed   CBC W/ AUTO DIFFERENTIAL - Abnormal; Notable for the following components:       Result Value    WBC 2.58 (*)     RBC 3.87 (*)     Hemoglobin 10.2 (*)     Hematocrit 31.6 (*)     MCH 26.4 (*)     RDW 16.9 (*)     Platelets 49 (*)     Gran # (ANC) 1.4 (*)     Lymph # 0.5 (*)     Eosinophil% 17.1 (*)     All other components within normal limits   COMPREHENSIVE METABOLIC PANEL - Abnormal; Notable for the following components:    BUN, Bld 23 (*)     Creatinine 5.5 (*)     Albumin 3.1 (*)     Alkaline Phosphatase 670 (*)     eGFR if  11.3 (*)     eGFR if non  9.8 (*)     All other components within normal limits   HCG, QUANTITATIVE, PREGNANCY   TSH    LEVETIRACETAM  (KEPPRA) LEVEL   LACOSAMIDE (VIMPAT)          Imaging Results    None          Medical Decision Making:   History:   Old Medical Records: I decided to obtain old medical records.  Clinical Tests:   Lab Tests: Ordered and Reviewed  Medical Tests: Ordered and Reviewed       APC / Resident Notes:   Patient is a 28-year-old female with a history of liver transplant and end-stage renal disease on HD on Tuesdays, Thursdays, and Saturdays presents the ED after being referred by her dialysis center for possible seizure today.  Her last seizure was more than 1 year ago.  She is compliant with Vimpat and Keppra, but did not take it this morning because she normally takes it after her dialysis sessions.    On exam, vital signs stable. NAD and nontoxic appearing.  She is resting comfortably on exam bed.  No neurological deficits appreciated.     Will order basic labs including seizure medication levels, load with Keppra 1 g, and continue to monitor.    CBC with chronic leukopenia at 2.58.  Normocytic anemia with H/H at 10.2/31.6.  Chronic thrombocytopenia with platelets at 49.  CMP with no significant electrolyte abnormalities.  Alk-phos is chronically elevated at 670 around patient's baseline.  TSH within normal limits at 2.6.  Beta HCG appropriate for a non-pregnant female.  Keppra and Vimpat levels pending.    1:20 PM  Patient reassessed.  She states she feels much better.  Labs with no acute abnormality or change from patient's baseline.  She has not had any seizures while here.  She was observed for approximately 3.5 hr without any seizure-like activity or change in altered mental status. In a patient with a history of seizure on appropriate seizure medication with an episode of possible seizure like activity earlier today, she does not need any further emergent work up; she is stable for outpatient follow-up.  She is to resume all of her medication and take her evening doses of seizure medications as  scheduled.  She is to follow up closely with her neurologist.  Follow up with PCP.  Strict ED return precautions given, and patient and her mother voiced their understanding.  All questions were answered.  Patient is comfortable with this plan and stable for discharge. I have reviewed patient's chart and discussed this case with my supervising MD.      Arielle Grover PA-C  Emergent Department  [facility]             Attending Attestation:     Physician Attestation Statement for NP/PA:   I have conducted a face to face encounter with this patient in addition to the NP/PA, due to Medical Complexity    Other NP/PA Attestation Additions:      Medical Decision Makin y/o female w/ ESRD, seizure (keppra and vimpat), h/o liver tx here w/ sz at dialysis, mid session. It was brief, around 15 seconds and had short post ictal period. Reports compliance with sz meds, last dose last night. Has not taken today, as she takes them after dialysis. Vitals normal. Afebrile. Clinically appears well. Exam unremarkable. No obvious sources of infection. Lab work unremarkable except ESRD, chronic thrombocytopenia. Given dose IV keppra here. Suspect dialysis decreased cerebral blood flow resulting in lowering of seizure thresholds/myoclonic jerking. Stable for discharge. Strict return precautions discussed.                    Clinical Impression:   The primary encounter diagnosis was Seizures. Diagnoses of Thrombocytopenia and ESRD (end stage renal disease) were also pertinent to this visit.      Disposition:   Disposition: Discharged  Condition: Stable                        Arielle Grover PA-C  11/15/18 1327       Oj Muñoz MD  [phone]

## 2018-11-15 NOTE — ED NOTES
Pt. Denies questions or concerns regarding discharge instructions or fu. No acute distress noted, taken to lobby in wheelchair per this RN. No distress noted.

## 2018-11-15 NOTE — DISCHARGE INSTRUCTIONS
Continue all medication as prescribed.  Resume your seizure medications this evening.  Follow up with all appointments as scheduled.  Follow up closely with your neurologist and primary care physician. Return to the emergency department for new or worsening symptoms.    Future Appointments   Date Time [facility] Department Center   11/16/2018 11:30 AM [facility] [id number] [facility] RAD IR [facility]   11/26/2018  9:00 AM LAB, [facility] [place]   12/5/2018 10:00 AM Adriane Rosario MD [facility] O[facility]   12/5/2018 11:00 AM Lei Pinedo MD [facility] HEPAT [facility]   1/14/2019 12:40 PM Stan Sosa MD [facility] PCW   3/12/2019  9:40 AM TED Crouch MD [facility]       Our goal in the emergency department is to always give you outstanding care and exceptional service. You may receive a survey by mail or e-mail in the next week regarding your experience in our ED. We would greatly appreciate your completing and returning the survey. Your feedback provides us with a way to recognize our staff who give very good care and it helps us learn how to improve when your experience was below our aspiration of excellence.

## 2018-11-16 PROBLEM — R79.89 ABNORMAL LFTS: Status: ACTIVE | Noted: 2018-01-01

## 2018-11-16 NOTE — PROGRESS NOTES
Transjugular liver biopsy pressures:    IVC - 5  Right atrium - 1  Free Hepatic Vein- 6  Wedge - 26

## 2018-11-16 NOTE — H&P
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 ()    Moderate protein-calorie malnutrition 2017    MRSA bacteremia 2017    Pneumonia     Prophylactic immunotherapy 2014    Renovascular hypertension 10/2/2015    Secondary hyperparathyroidism 2017    Seizures     Sialadenitis 3/21/2018    Thrombocytopenia 2016     Past Surgical History:   Procedure Laterality Date    BIOPSY-LIVER N/A 2017    Performed by [facility] at [facility] OR 2ND FLR    BIOPSY-LIVER N/A 3/22/2016    Performed by [facility] Diagnostic Provider at [facility] OR 2ND FLR     SECTION      x 2    CONIZATION OF CERVIX USING LOOP ELECTROSURGICAL EXCISION PROCEDURE (LEEP) N/A 6/15/2018    Procedure: CONIZATION-CERVICAL-LEEP;  Surgeon: Neelam Marroquin MD;  Location: [facility] OR;  Service: OB/GYN;  Laterality: N/A;    CONIZATION-CERVICAL-LEEP N/A 6/15/2018    Performed by [facility]elam Marroquin MD at [facility] OR    [id number]; upper extremity Left 2015    Performed by Idalia Diaz MD  at [facility] OR 2ND FLR    EMBOLIZATION N/A 7/21/2018    Procedure: EMBOLIZATION, BLOOD VESSEL;  Surgeon: Aren Ramos MD;  Location: [facility] CATH LAB;  Service: Radiology;  Laterality: N/A;    EMBOLIZATION, BLOOD VESSEL N/A 7/21/2018    Performed by Aren Ramos MD at [facility] CATH LAB    Exam Under Anesthesia N/A 8/8/2018    Performed by [facility]am Marroquin MD at [facility] OR 2ND FLR    Exam under anesthesia N/A 6/19/2018    Performed by Neelam Marroquin MD at [facility] OR    Exam under anesthesia (ADD ON ) N/A 7/9/2018    Performed by NICKIE Alvarez MD at [facility] OR    Exam under anesthesia -cervical suturing  N/A 7/26/2018    Performed by Lei Sims III, MD at [facility] OR    EXAMINATION UNDER ANESTHESIA N/A 6/19/2018    Procedure: Exam under anesthesia;  Surgeon: Neelam Marroquin MD;  Location: [facility];  Service: OB/GYN;  Laterality: N/A;    EXAMINATION UNDER ANESTHESIA N/A 7/9/2018    Procedure: Exam under anesthesia (ADD ON );  Surgeon: NICKIE Alvarez MD;  Location: [facility] OR;  Service: OB/GYN;  Laterality: N/A;  (ADD ON )    EXAMINATION UNDER ANESTHESIA N/A 7/26/2018    Procedure: Exam under anesthesia -cervical suturing ;  Surgeon: Lei Sims III, MD;  Location: [facility] OR;  Service: OB/GYN;  Laterality: N/A;    EXAMINATION UNDER ANESTHESIA N/A 8/8/2018    Procedure: Exam Under Anesthesia;  Surgeon: Neelam Marroquin MD;  Location: [facility] OR 2ND FLR;  Service: OB/GYN;  Laterality: N/A;  need endoloop  request 12 noon    FISTULOGRAM Left 12/4/2015    Performed by Idalia Diaz MD at [facility] CATH LAB    LIVER BIOPSY      LIVER TRANSPLANT  09/1992    PERINEORRHAPHY  6/19/2018    Procedure: SUTURE REPAIR,CERVIX;  Surgeon: Neelam Marroquin MD;  Location: [facility];  Service: OB/GYN;;    SUTURE REPAIR,CERVIX  6/19/2018    Performed by Neelam Marroquin MD at [facility] OR    TUBAL LIGATION  2010       Home Meds:   Prior to Admission medications    Medication Sig Start Date End Date Taking?  Authorizing Provider   aspirin 81 MG Chew Take 1 tablet (81 mg total) by mouth once daily. 2/21/18 2/21/19 Yes Farheen Tellez MD   carvedilol (COREG) 25 MG tablet Take 1 tablet (25 mg total) by mouth 2 (two) times daily. 10/12/18  Yes Stan Sosa MD   cinacalcet (SENSIPAR) 30 MG Tab Take 1 tablet (30 mg total) by mouth daily with breakfast.  Patient taking differently: Take 30 mg by mouth daily with breakfast. On Dialysis days 9/20/18 9/20/19 Yes Kennedy Loco MD   citalopram (CELEXA) 20 MG tablet TAKE 1 TABLET BY MOUTH EVERY DAY 6/26/18  Yes SAM Huerta   hydrALAZINE (APRESOLINE) 100 MG tablet Take 1 tablet (100 mg total) by mouth every 12 (twelve) hours. 10/7/18 12/6/18 Yes Prosper Greene MD   irbesartan (AVAPRO) 300 MG tablet Take 1 tablet (300 mg total) by mouth once daily. 10/7/18 12/6/18 Yes Prosper Greene MD   lacosamide (VIMPAT) 50 mg Tab Take 1 tablet (50 mg total) by mouth 2 (two) times daily. 10/24/18 10/24/19 Yes Michael Mendoza MD   levETIRAcetam (KEPPRA) 500 MG Tab Take 1 tablet (500 mg total) by mouth 2 (two) times daily. 10/24/18 10/24/19 Yes Michael Mendoza MD   minoxidil (LONITEN) 2.5 MG tablet Take 2 tablets (5 mg total) by mouth 2 (two) times daily. 10/7/18 12/6/18 Yes Prosper Greene MD   NIFEdipine (PROCARDIA-XL) 60 MG (OSM) 24 hr tablet Take 2 tablets (120 mg total) by mouth once daily. 10/7/18 12/6/18 Yes Prosper Greene MD   tacrolimus (PROGRAF) 1 MG Cap Take 6 capsules (6mg) in the morning and 6 capsules (6mg) in the evening 9/4/18  Yes Jeancarlos Banks MD   famotidine (PEPCID) 40 MG tablet Take 0.5 tablets (20 mg total) by mouth once daily. 6/29/18 6/29/19  Ivana Diaz PA-C   ondansetron (ZOFRAN) 4 MG tablet Take 1 tablet (4 mg total) by mouth every 6 (six) hours as needed for Nausea.  Patient taking differently: Take 4 mg by mouth once daily.  5/3/18   Kendra Del Valle MD   triamcinolone acetonide 0.1% (KENALOG) 0.1 % ointment AAA on arms,  legs, and neck bid x 1-2 wks then prn flares only 12/31/14   Diana Grider MD     Anticoagulants/Antiplatelets: aspirin (last dose ~ 24 hrs ago)    Allergies:   Review of patient's allergies indicates:   Allergen Reactions    Chloral hydrate      Other reaction(s): Hallucinations  Other reaction(s): Hives    Hydrocodone Other (See Comments)     Mental status changes     Sedation History:  no adverse reactions    Review of Systems:   Hematological: no known coagulopathies  Respiratory: no shortness of breath  Cardiovascular: no chest pain  Gastrointestinal: no abdominal pain  Genito-Urinary: no dysuria  Musculoskeletal: negative  Neurological: no TIA or stroke symptoms         OBJECTIVE:     Vital Signs (Most Recent)  Temp: 97.6 °F (36.4 °C) (11/16/18 1119)  Pulse: 88 (11/16/18 1119)  Resp: 18 (11/16/18 1119)  BP: (!) 151/84 (11/16/18 1119)  SpO2: 100 % (11/16/18 1119)    Physical Exam:  ASA: 2  Mallampati: 2    General: no acute distress  Mental Status: alert and oriented to person, place and time  HEENT: normocephalic, atraumatic  Chest: unlabored breathing  Heart: regular heart rate  Abdomen: nondistended  Extremity: moves all extremities    Laboratory  Lab Results   Component Value Date    INR 1.1 11/16/2018       Lab Results   Component Value Date    WBC 2.43 (L) 11/16/2018    HGB 10.7 (L) 11/16/2018    HCT 33.5 (L) 11/16/2018    MCV 83 11/16/2018    PLT 60 (L) 11/16/2018      Lab Results   Component Value Date    GLU 95 11/15/2018     11/15/2018    K 3.6 11/15/2018     11/15/2018    CO2 26 11/15/2018    BUN 23 (H) 11/15/2018    CREATININE 5.5 (H) 11/15/2018    CALCIUM 9.5 11/15/2018    MG 2.3 10/07/2018    ALT 33 11/15/2018    AST 39 11/15/2018    ALBUMIN 3.1 (L) 11/15/2018    BILITOT 1.0 11/15/2018    BILIDIR 0.4 (H) 10/04/2018       ASSESSMENT/PLAN:     Sedation Plan: Moderate    Patient will undergo transjugular liver biopsy.      Braden Hawley MD  Radiology, PGY - III  [phone]

## 2018-11-16 NOTE — PROCEDURES
Radiology Post-Procedure Note    Pre Op Diagnosis: Elevated LFTs    Post Op Diagnosis: Elevated LFTs    Procedure: Transjugular liver biposy    Procedure performed by: Tian Radford MD    Written Informed Consent Obtained: Yes    Specimen Removed: YES 4 core samples    Estimated Blood Loss: Minimal    Findings: Local anesthesia and moderate sedation were used.    A right-sided transjugular approach was used to performed hepatic venography, pressure measurements and liver biopsy.  Hepatic wedge pressure was 26, free hepatic vein pressure 6, right atrial pressure 1 indicating a transhepatic gradient of 20.  4 random specimens of the right hepatic lobe were obtained and sent to pathology for further evaluation.    Hemostasis of the right internal jugular vein was achieved using manual pressure and there was no hematoma.    The patient tolerated the procedure well and there were no complications.  Please see Imaging report for further details.    Tian Radford MD  Diagnostic and Interventional Radiologist  Department of Radiology  Pager: [phone]

## 2018-11-16 NOTE — DISCHARGE SUMMARY
Radiology Discharge Summary      Hospital Course: No complications    Admit Date: 11/16/2018  Discharge Date: 11/16/2018     Instructions Given to Patient: Yes  Diet: Resume prior diet  Activity: activity as tolerated    Description of Condition on Discharge: Stable  Vital Signs (Most Recent): Temp: 97.6 °F (36.4 °C) (11/16/18 1119)  Pulse: 100 (11/16/18 1530)  Resp: 18 (11/16/18 1530)  BP: (!) 140/73 (11/16/18 1530)  SpO2: 98 % (11/16/18 1530)    Discharge Disposition: Home    Discharge Diagnosis: Elevated LFTs s/p transjugular liver biopsy     Follow-up: KENNEDY Radford MD  Diagnostic and Interventional Radiologist  Department of Radiology  Pager: [phone]

## 2018-11-19 NOTE — TELEPHONE ENCOUNTER
[facility] Liver Pathology Conference Note    Patient:  Holly Patel  MRN:   [id number]  YOB: 1990  Date of Transplant:  11/8/92  Native Diagnosis: Primary Liver Malignancy: Hemangioendothelioma, Hemangiosarcoma, Angiosarcoma    Discussion/Plan:    Presenter: Hepatologist - [place]    Reason for presenting: checking for chronic rejection    Concerns for Pathologists:     Lab Results  WBC (K/uL)   Date Value   11/16/2018 2.43 (L)   11/15/2018 2.58 (L)   10/26/2018 3.88 (L)     PLT (K/uL)   Date Value   11/16/2018 60 (L)   11/15/2018 49 (L)   10/26/2018 111 (L)     TACROLIMUS (ng/mL)   Date Value   10/26/2018 6.0   10/12/2018 <1.5 (L)   10/06/2018 4.0 (L)     INR (no units)   Date Value   11/16/2018 1.1   10/12/2018 1.2   09/26/2018 1.1     AST (U/L)   Date Value   11/15/2018 39     ALT (U/L)   Date Value   11/15/2018 33   10/26/2018 52 (H)   10/12/2018 41     BILITOT (mg/dL)   Date Value   11/15/2018 1.0   10/26/2018 0.6   10/12/2018 0.5     ALKPHOS (U/L)   Date Value   11/15/2018 670 (H)   10/26/2018 705 (H)   10/12/2018 795 (H)     CREATININE (mg/dL)   Date Value   11/15/2018 5.5 (H)   10/26/2018 6.7 (H)   10/12/2018 6.3 (H)     AFP (no units)   Date Value   09/17/2008 2.36 MoM (95.9 ng/mL)     CMVIGGINTERP (no units)   Date Value   09/26/2018 Reactive (A)   05/16/2018 Reactive (A)

## 2018-11-28 NOTE — TELEPHONE ENCOUNTER
Spoke with pt. Reviewed lab results and biopsy findings. Discussed with pt that her biopsy does show chronic rejection. Discussed that there are no treatments for chronic rejection. Advised that to prevent further damage to her liver, she needs to be 100% compliant with her medications. Pt states she has been and states she understands that she should not miss any medication doses.  Pt agreed to repeat labs 1/7/19.

## 2018-12-05 NOTE — PROGRESS NOTES
Subjective:       Patient ID: Holly Patel is a 28 y.o. female.    Chief Complaint: Follow-up    HPI  I saw this 27 year old  lady in the liver transplant clinic. She is currently on HD three times weekly because of hypertension induced and chronic immunosuppression- induced kidney failure.    Had been on home dialysis- recently switched to outpt HD  On HD since Oct 2015, home HD since Feb 2016.    - 2 recent hospital admissions  -  presumed UTI and more recently with abdominal distension.  - hypertensive emergency and tonic clonic seizure    - started on anti-epileptics    She had high LFTs in June 2017 and a liver biopsy showed stage 2-3 fibrosis as well as evidence of chronic rejection.    She is currently on Tac 1mg BID and finally appears to be taking it more regularly.    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  Denied kidney Tx for compliance    She has certainly has a lot of undetectable tacrolimus blood levels but more recently this seems to have changed for the better.    She used to come to clinic with uncontrolled hypertension but today her BP was 147/90.    She has had 3 liver biopsies since 2016 and her latest one was in Nov 2018  - this did show chronic rejection/ductopenia but only stage 2 fibrosis.    Her imaging shows ascites but in the absence of obvious portal hypertension, this maybe related to her kidney failure.        CT abdo: 1/17/2018  1.  Postsurgical changes consistent with liver transplantation with splenomegaly and moderate volume ascites.  2.  Otherwise no acute intra-abdominal abnormalities identified.  3.  Small bilateral pleural effusions, right greater than left with worsening patchy opacification/consolidation within the lower lobes may reflect worsening pulmonary edema versus atypical pneumonia.    MELD-Na score: 21 at 11/16/2018 10:21 AM  MELD score: 21 at 11/16/2018 10:21 AM  Calculated from:  Serum Creatinine: 0  Serum Sodium: 138 mmol/L (Rounded  to 137 mmol/L) at 11/15/2018 10:41 AM  Total Bilirubin: 1 mg/dL at 11/15/2018 10:41 AM  INR(ratio): 1.1 at 2018 10:21 AM  Age: 28 years        Lab Results   Component Value Date    ALT 41 2018    AST 38 2018     (H) 2012    ALKPHOS 738 (H) 2018    BILITOT 0.9 2018     Past Medical History:   Diagnosis Date    Anemia in ESRD (end-stage renal disease) 10/12/2015    dialysis tues, thursday, sat; access left arm    Chronic rejection of liver transplant 3/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 ()    Moderate protein-calorie malnutrition 2017    MRSA bacteremia 2017    Pneumonia     Prophylactic immunotherapy 2014    Renovascular hypertension 10/2/2015    Secondary hyperparathyroidism 2017    Seizures     Sialadenitis 3/21/2018    Thrombocytopenia 2016     Past Surgical History:   Procedure Laterality Date    BIOPSY, LIVER, TRANSJUGULAR APPROACH N/A 2018    Performed by [facility] Provider at [facility] OR 2ND FLR    BIOPSY-LIVER N/A 2017    Performed by [facility] Diagnostic Provider at [facility] OR 2ND FLR    BIOPSY-LIVER N/A 3/22/2016    Performed by [facility] Diagnostic Provider at [facility] OR 2ND FLR     SECTION      x 2    CONIZATION OF CERVIX USING LOOP ELECTROSURGICAL EXCISION PROCEDURE (LEEP) N/A 6/15/2018    Procedure: CONIZATION-CERVICAL-LEEP;  Surgeon: Neelam Marroquin MD;  Location: [facility];  Service: OB/GYN;  Laterality: N/A;    CONIZATION-CERVICAL-LEEP N/A 6/15/2018    Per[facility] by [facility] at [facility] OR     [id number]; upper extremity Left 7/17/2015    Performed by Idalia Diaz MD at [facility] OR 2ND FLR    EMBOLIZATION N/A 7/21/2018    Procedure: EMBOLIZATION, BLOOD VESSEL;  Surgeon: Aren Ramos MD;  Location: [facility] CATH LAB;  Service: Radiology;  Laterality: N/A;    EMBOLIZATION, BLOOD VESSEL N/A 7/21/2018    Performed by Aren Ramos MD at [facility] CATH LAB    Exam Under Anesthesia N/A 8/8/2018    Performed by Neelam Marroquin MD at [facility] OR 2ND FLR    Exam under anesthesia N/A 6/19/2018    Performed by [facility]am Marroquin MD at [facility] OR    Exam under anesthesia (ADD ON ) N/A 7/9/2018    Performed by NICKIE Alvarez MD at [facility] OR    Exam under anesthesia -cervical suturing  N/A 7/26/2018    Performed by Lei Sims III, MD at [facility] OR    EXAMINATION UNDER ANESTHESIA N/A 6/19/2018    Procedure: Exam under anesthesia;  Surgeon: Neelam Marroquin MD;  Location: [facility];  Service: OB/GYN;  Laterality: N/A;    EXAMINATION UNDER ANESTHESIA N/A 7/9/2018    Procedure: Exam under anesthesia (ADD ON );  Surgeon: NICKIE Alvarez MD;  Location: [facility];  Service: OB/GYN;  Laterality: N/A;  (ADD ON )    EXAMINATION UNDER ANESTHESIA N/A 7/26/2018    Procedure: Exam under anesthesia -cervical suturing ;  Surgeon: Lei Sims III, MD;  Location: [facility];  Service: OB/GYN;  Laterality: N/A;    EXAMINATION UNDER ANESTHESIA N/A 8/8/2018    Procedure: Exam Under Anesthesia;  Surgeon: Neelam Marroquin MD;  Location: [facility] OR 2ND FLR;  Service: OB/GYN;  Laterality: N/A;  need endoloop  request 12 noon    FISTULOGRAM Left 12/4/2015    Performed by Idalia Diaz MD at [facility] CATH LAB    LIVER BIOPSY      LIVER TRANSPLANT  09/1992    PERINEORRHAPHY  6/19/2018    Procedure: SUTURE REPAIR,CERVIX;  Surgeon: Neelam Marroquin MD;  Location: [facility];  Service: OB/GYN;;    SUTURE REPAIR,CERVIX  6/19/2018    Performed by [facility]am [facility]quin MD at [facility] OR    TRANSJUGULAR BIOPSY OF  LIVER N/A 11/16/2018    Procedure: BIOPSY, LIVER, TRANSJUGULAR APPROACH;  Surgeon: Cindy Diagnostic Provider;  Location: [facility] OR [address] [place] [address];  Service: Radiology;  Laterality: N/A;    TUBAL LIGATION  2010     Current Outpatient Medications   Medication Sig    aspirin 81 MG Chew Take 1 tablet (81 mg total) by mouth once daily.    carvedilol (COREG) 25 MG tablet Take 1 tablet (25 mg total) by mouth 2 (two) times daily.    cinacalcet (SENSIPAR) 30 MG Tab Take 1 tablet (30 mg total) by mouth daily with breakfast. (Patient taking differently: Take 30 mg by mouth daily with breakfast. On Dialysis days)    citalopram (CELEXA) 20 MG tablet TAKE 1 TABLET BY MOUTH EVERY DAY    famotidine (PEPCID) 40 MG tablet Take 0.5 tablets (20 mg total) by mouth once daily.    hydrALAZINE (APRESOLINE) 100 MG tablet Take 1 tablet (100 mg total) by mouth every 12 (twelve) hours.    irbesartan (AVAPRO) 300 MG tablet Take 1 tablet (300 mg total) by mouth once daily.    lacosamide (VIMPAT) 50 mg Tab Take 1 tablet (50 mg total) by mouth 2 (two) times daily.    levETIRAcetam (KEPPRA) 500 MG Tab Take 1 tablet (500 mg total) by mouth 2 (two) times daily.    minoxidil (LONITEN) 2.5 MG tablet Take 2 tablets (5 mg total) by mouth 2 (two) times daily.    NIFEdipine (PROCARDIA-XL) 60 MG (OSM) 24 hr tablet Take 2 tablets (120 mg total) by mouth once daily.    ondansetron (ZOFRAN) 4 MG tablet Take 1 tablet (4 mg total) by mouth every 6 (six) hours as needed for Nausea. (Patient taking differently: Take 4 mg by mouth once daily. )    tacrolimus (PROGRAF) 1 MG Cap Take 6 capsules (6 mg total) by mouth every 12 (twelve) hours.    triamcinolone acetonide 0.1% (KENALOG) 0.1 % ointment AAA on arms, legs, and neck bid x 1-2 wks then prn flares only     No current facility-administered medications for this visit.        Review of Systems   Constitutional: Negative for activity change, appetite change, chills, fatigue, fever and unexpected weight change.  "  HENT: Negative for ear pain, hearing loss, nosebleeds, sore throat and trouble swallowing.    Eyes: Negative for redness and visual disturbance.   Respiratory: Negative for cough, chest tightness, shortness of breath and wheezing.    Cardiovascular: Negative for chest pain and palpitations.   Gastrointestinal: Negative for abdominal distention, abdominal pain, blood in stool, constipation, diarrhea, nausea and vomiting.   Genitourinary: Negative for difficulty urinating, dysuria, frequency, hematuria and urgency.   Musculoskeletal: Negative for arthralgias, back pain, gait problem, joint swelling and myalgias.   Skin: Negative for rash.   Neurological: Negative for tremors, seizures, speech difficulty, weakness and headaches.   Hematological: Negative for adenopathy.   Psychiatric/Behavioral: Negative for confusion, decreased concentration and sleep disturbance. The patient is not nervous/anxious.          Objective:    BP (!) 147/90 (BP Location: Right arm, Patient Position: Sitting, BP Method: Small (Automatic))   Pulse 87   Ht 5' 5" (1.651 m)   Wt 51.8 kg (114 lb 3.2 oz)   SpO2 99%   BMI 19.00 kg/m²     Physical Exam   Constitutional: She appears well-developed and well-nourished. No distress.   HENT:   Head: Normocephalic.   Eyes: Pupils are equal, round, and reactive to light.   Neck: No JVD present. No tracheal deviation present. No thyromegaly present.   Cardiovascular: Normal rate, regular rhythm and normal heart sounds.   No murmur heard.  Pulmonary/Chest: Effort normal and breath sounds normal. No stridor.   Abdominal: Soft. Bowel sounds are normal. She exhibits distension. There is no tenderness.   Mild ascites   Musculoskeletal: She exhibits no edema.   Lymphadenopathy:        Head (right side): No submental, no submandibular, no tonsillar, no preauricular, no posterior auricular and no occipital adenopathy present.        Head (left side): No submental, no submandibular, no tonsillar, no " preauricular, no posterior auricular and no occipital adenopathy present.     She has no cervical adenopathy.     She has no axillary adenopathy.   Neurological: She is alert. She has normal strength. She is not disoriented. No cranial nerve deficit or sensory deficit.   Skin: Skin is intact. No cyanosis.       Assessment:       1. Hypertension, unspecified type    2. Liver replaced by transplant    3. Immunosuppressed    4. ESRD on hemodialysis        Plan:   1. hypertension- nephrologist adjusts her medications  2. Continue with same dose tac and adjust according to levels. She has previously been warned  That she needs to remain compliant or she will lose her liver from chronic rejection and will likely not get another transplant.  3. Latest abdo US shows ascites- clinically better than before. She thinks that the outpt HD is achieving better control of her fluid.  4. Recent seizures- neuro follow up    She certainly does not need liver transplantation at present and if she is a candidate for kidney Tx, this can proceed acknowledging that she has an element of chronic rejection in her liver.    Clinic in 3 months.

## 2018-12-13 NOTE — ED PROVIDER NOTES
Encounter Date: 12/12/2018    SCRIBE #1 NOTE: I, Kecia Ybarra, am scribing for, and in the presence of,  Evelia PAREKH. I have scribed the following portions of the note - Other sections scribed: HPI, ROS, PE.       History     Chief Complaint   Patient presents with    Dental Pain     pt reports she has been having left sided dental pain x 2 days with no relief from OTC ibuprofen and orajel. pt reports hx of liver transplant and is on dialysis     28 y.o female with complicated medical history including liver transplant 1992, Renovascular HTN, ESRD on dialysis T, Th, Sat presents to the ED with chief complaint of left upper dental pain.  She has left upper and lower dental pain for 2 days. She notes having a dentist appointment tomorrow. She denies facial swelling, redness, drainage from gums, or fever.   She also reports right forearm pain and swelling for 4 days. She denies any injury or fall. Her pain is 5/10.   She went to her appointment for dialysis yesterday and states she had a normal dialysis session. Her next appointment is tomorrow. She takes carvedilol, hydralazine, minoxidil, and nifedipine twice a day for her blood pressure. She has not taken her second dose today. She usually takes it at 7:00pm. She is also on Prograf due to having a liver transplant when she was 1 y.o.   She was recently hospitalized in October with hypertensive emergency.  She denies fever, chills, SOB, chest pain, dizziness, or changes in vision.       The history is provided by the patient.     Review of patient's allergies indicates:   Allergen Reactions    Chloral hydrate      Other reaction(s): Hallucinations  Other reaction(s): Hives    Hydrocodone Other (See Comments)     Mental status changes     Past Medical History:   Diagnosis Date    Anemia in ESRD (end-stage renal disease) 10/12/2015    dialysis tues, thursday, sat; access left arm    Chronic rejection of liver transplant 3/22/2016    Depression      Encounter for blood transfusion     ESRD on hemodialysis 2015    History of recent hospitalization 2018    pneumonia    History of splenomegaly 2016    Immunosuppressed 2017    Iron deficiency anemia secondary to inadequate dietary iron intake 2017    She [facility].   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 ()    Moderate protein-calorie malnutrition 2017    MRSA bacteremia 2017    Pneumonia     Prophylactic immunotherapy 2014    Renovascular hypertension 10/2/2015    Secondary hyperparathyroidism 2017    Seizures     Sialadenitis 3/21/2018    Thrombocytopenia 2016     Past Surgical History:   Procedure Laterality Date    BIOPSY, LIVER, TRANSJUGULAR APPROACH N/A 2018    [facility]    BIOPSY-LIVER N/A 2017    [facility]    BIOPSY-LIVER N/A 3/22/2016    [facility]     SECTION      x 2    CONIZATION OF CERVIX USING LOOP ELECTROSURGICAL EXCISION PROCEDURE (LEEP) N/A 6/15/2018    Procedure: CONIZATION-CERVICAL-LEEP;  Surgeon: Neelam Marroquin MD;  Location: [facility] OR;  Service: OB/GYN;  Laterality: N/A;    CONIZATION-CERVICAL-LEEP N/A 6/15/2018    Performed by Neelam Marroquin MD at [facility] OR    [id number]; upper extremity Left 2015    Performed by Idalia Diaz MD at [facility] OR 2ND FLR    EMBOLIZATION N/A 2018    Procedure: EMBOLIZATION, BLOOD VESSEL;  Surgeon: Aren Ramos MD;  Location: [facility] CATH LAB;  Service: Radiology;  Laterality: N/A;    EMBOLIZATION, BLOOD VESSEL N/A 2018    Performed by Aren Ramos MD at [facility] CATH LAB    Exam Under Anesthesia N/A 2018    Performed by [facility] at [facility] OR 2ND FLR    Exam under anesthesia N/A 2018    Performed  by [facility] at [facility] OR    Exam under anesthesia (ADD ON ) N/A 7/9/2018    Performed by NICKIE Alvarez MD at [facility] OR    Exam under anesthesia -cervical suturing  N/A 7/26/2018    Performed by Lei Sims III, MD at [facility] OR    EXAMINATION UNDER ANESTHESIA N/A 6/19/2018    Procedure: Exam under anesthesia;  Surgeon: Neelam Marroquin MD;  Location: [facility];  Service: OB/GYN;  Laterality: N/A;    EXAMINATION UNDER ANESTHESIA N/A 7/9/2018    Procedure: Exam under anesthesia (ADD ON );  Surgeon: NICKIE Alvarez MD;  Location: [facility] OR;  Service: OB/GYN;  Laterality: N/A;  (ADD ON )    EXAMINATION UNDER ANESTHESIA N/A 7/26/2018    Procedure: Exam under anesthesia -cervical suturing ;  Surgeon: Lei Sims III, MD;  Location: [facility] OR;  Service: OB/GYN;  Laterality: N/A;    EXAMINATION UNDER ANESTHESIA N/A 8/8/2018    Procedure: Exam Under Anesthesia;  Surgeon: Neelam Marroquin MD;  Location: [facility] OR 2ND FLR;  Service: OB/GYN;  Laterality: N/A;  need endoloop  request 12 noon    FISTULOGRAM Left 12/4/2015    Performed by Idalia Diaz MD at [facility] CATH LAB    LIVER BIOPSY      LIVER TRANSPLANT  09/1992    PERINEORRHAPHY  6/19/2018    Procedure: SUTURE REPAIR,CERVIX;  Surgeon: Neelam Marroquin MD;  Location: [facility];  Service: OB/GYN;;    SUTURE REPAIR,CERVIX  6/19/2018    Performed by [facility]quin MD at [facility] OR    TRANSJUGULAR BIOPSY OF LIVER N/A 11/16/2018    Procedure: BIOPSY, LIVER, TRANSJUGULAR APPROACH;  Surgeon: [facility] Diagnostic Provider;  Location: [facility] OR 2ND FLR;  Service: Radiology;  Laterality: N/A;    TUBAL LIGATION  2010     Family History   Problem Relation Age of Onset    Hypertension Mother     Hypertension Father     Cancer Sister     Heart attack Maternal Uncle     Melanoma Neg Hx     Breast cancer Neg Hx     Colon cancer Neg Hx     Ovarian cancer Neg Hx      Social History     Tobacco Use    Smoking status: Never Smoker    Smokeless  tobacco: Never Used   Substance Use Topics    Alcohol use: No    Drug use: No     Review of Systems   Constitutional: Negative for chills and fever.   HENT: Positive for dental problem.    Eyes: Negative for visual disturbance.   Respiratory: Negative for shortness of breath.    Cardiovascular: Negative for chest pain.   Musculoskeletal: Positive for arthralgias (right forearm).   All other systems reviewed and are negative.      Physical Exam     Initial Vitals [12/12/18 1742]   BP Pulse Resp Temp SpO2   (S) (!) 217/153 84 19 98.2 °F (36.8 °C) 100 %      MAP       --         Physical Exam    Nursing note and vitals reviewed.  Constitutional: She appears well-developed and well-nourished. She is not diaphoretic. No distress.   HENT:   Head: Normocephalic and atraumatic.   Mouth/Throat: Uvula is midline, oropharynx is clear and moist and mucous membranes are normal. No oral lesions. No trismus in the jaw. Abnormal dentition (dental caries/tooth decay left upper molar. ). No dental abscesses, uvula swelling or dental caries.   Eyes: EOM are normal.   Cardiovascular: Normal rate, regular rhythm and normal heart sounds. Exam reveals no gallop and no friction rub.    No murmur heard.  AV fistula left forearm - no overlying swelling or redness, + thrill.    Pulmonary/Chest: Breath sounds normal. No respiratory distress. She has no wheezes. She has no rhonchi. She has no rales.   Abdominal: Soft. Bowel sounds are normal. She exhibits no distension. There is no tenderness. There is no rebound.   Musculoskeletal: Normal range of motion. She exhibits tenderness ( right forearm, mid dorsal aspect - there is small area of swelling without overlying erythema, fluctuance, induration.  ).        Right forearm: She exhibits tenderness and swelling. She exhibits no bony tenderness, no edema, no deformity and no laceration.        Arms:  Neurological: She is alert and oriented to person, place, and time. No cranial nerve deficit or  sensory deficit.   Skin: Skin is warm and dry. Capillary refill takes less than 2 seconds.   Psychiatric: She has a normal mood and affect. Her behavior is normal.         ED Course   Procedures  Labs Reviewed - No data to display       Imaging Results    None          Medical Decision Making:   Clinical Tests:   Medical Tests: Ordered and Reviewed       APC / Resident Notes:   Patient presents to the ER chief complaint of left upper dental pain x 2 days.   Based upon the history and physical I see no signs of Klever's angina, sublingual swelling, facial swelling, airway compromise, facial cellulitis, sepsis, dehydration, or a fluctuant abscess to drain.  I will treat with penicillin VK with concern for underlying dental infection.  She will follow up with dentist tomorrow as scheduled.  I have given #12 tablets tylenol with codeine, on pharmacy database review the patient had this medication filled in January and says she did well on this medication.  No other recent narcotic prescriptions.  She says that her physician told her she may take tylenol short-term.    Patient has right forearm swelling without recent injury, bony tenderness, wounds.  There is no erythema or fluctuance to suggest cellulitis or abscess.  It is not clear with what small area of swelling is caused by.  I have applied Ace wrap and advised for her to follow up with a primary care physician for ER follow-up exam and return if symptoms worsen.  Patient's blood pressure is elevated in the ED.  She has a history of longstanding uncontrolled hypertension.  She was admitted October 24th with hypertensive emergency due to significantly elevated blood pressure with headache.  CT was negative for acute process.  She required nifedipine infusion and multiple doses of antihypertensives to improve her pressure.  Patient says her pressure was high at dialysis yesterday and she was given a dose of clonidine with improvement to 168/90.  The patient says she  is due for her nighttime meds.  We do not have all of her medications in our system, but will give carvedilol, hydralazine, and clonidine.  Blood pressure is improving with this medication.  She is asymptomatic in regards to elevated blood pressure and her presentation is not concerning for hypertensive emergency.  Patient says she does not wish to be admitted.  Patient will take 2 additional blood pressure medications that she is scheduled for when she returns home.  She will follow up for dialysis tomorrow as scheduled and is given strict ER return precautions.  She is advised to follow up with her PCP in 1-2 days due to uncontrolled high blood pressure.  Patient says she recently had changes in her blood pressure medications since her admission 2 months ago and her blood pressure has been improving.       Scribe Attestation:   Scribe #1: I performed the above scribed service and the documentation accurately describes the services I performed. I attest to the accuracy of the note.    I, Evelia Garcia, personally performed the services described in this documentation. All medical record entries made by the scribe were at my direction and in my presence.  I have reviewed the chart and agree that the record reflects my personal performance and is accurate and complete. Evelia Garcia, APC.  10:49 PM 12/12/2018             Clinical Impression:     1. Hypertension, unspecified type    2. Elevated blood pressure reading    3. Pain, dental    4. Right forearm pain                                 IGLESIA Diaz  12/12/18 2616

## 2018-12-28 NOTE — TELEPHONE ENCOUNTER
----- Message from Jaki Mejia sent at 12/27/2018  5:07 PM CST -----  I rescheduled her labs to tomorrow 12/28/18, I left her a voicemail of the change    ----- Message -----  From: Christine Coello  Sent: 12/27/2018  11:50 AM  To: [facility] Post-Liver Transplant Non-Clinical    resched blood work from today to tomorrow    Pt contact [phone]

## 2018-12-31 NOTE — TELEPHONE ENCOUNTER
Letter sent, liver labs stable and no medication changes are needed. Reminded pt to remain compliant with medications as tac level was undetectable again. Repeat labs due 1/28/19.

## 2018-12-31 NOTE — LETTER
December 31, 2018    Holly Patel  [address] [place] [id number]          Dear Holly Patel:  MRN: [id number]    This is a follow up to your recent labs, your liver lab results were stable.  Your tacrolimus level was undetected. It is very important to remain compliant with your Prograf/tacrolimus to prevent rejection. Please have your labs drawn again on 1/28/19.      If you cannot have your labs drawn on the scheduled date, it is your responsibility to call the transplant department to reschedule.  To reschedule or make an appointment, please as to speak to or leave a message for my assistant, Jaki Chavis, at [phone].  When leaving a message for Palmira Pollack, or myself, we ask that you leave a brief message regarding your request.    Sincerely,    Violeta Francis, RN, BSN, [facility]  Liver Transplant Coordinator  [facility]  [address] [place] [id number]  [phone]

## 2018-12-31 NOTE — ED PROVIDER NOTES
Encounter Date: 12/31/2018       History     Chief Complaint   Patient presents with    Headache     pt presents to ER with c/o headache to sukhjinder temporal area and dental pain to sukhjinder sides of mouth top and bottom.  pt is currently a dialysis pt.     28 y.o. female with multiple medical problems including ESRD on hemodialysis Tuesday Thursday Saturday, hypertension (poorly controlled), liver transplant and others presents to emergency department complaining of acute on chronic frontal, bilateral headache that began around 10:00 p.m. tonight.  She states headache is similar to previous headaches and is throbbing in nature.  She reports taking ibuprofen x2 tablets prior to arrival without improvement.  She also reports gum pain around all of her teeth and states that she was seen by a dentist for this problem and was told that she needs a dental cleaning.  She states she has not scheduled a cleaning due to lack of dental insurance.  She denies facial swelling, vision disturbance, fever, neck stiffness, photophobia, focal weakness, dizziness or syncope.          Review of patient's allergies indicates:   Allergen Reactions    Chloral hydrate      Other reaction(s): Hallucinations  Other reaction(s): Hives    Hydrocodone Other (See Comments)     Mental status changes     Past Medical History:   Diagnosis Date    Anemia in ESRD (end-stage renal disease) 10/12/2015    dialysis tues, thursday, sat; access left arm    Chronic rejection of liver transplant 3/22/2016    Depression     Encounter for blood transfusion     ESRD on hemodialysis 9/30/2015    History of recent hospitalization 05/2018    pneumonia    History of splenomegaly 4/12/2016    Immunosuppressed 8/5/2017    Iron deficiency anemia secondary to inadequate dietary iron intake 8/16/2017    She [facility].   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)    Moderate  protein-calorie malnutrition 2017    MRSA bacteremia 2017    Pneumonia     Prophylactic immunotherapy 2014    Renovascular hypertension 10/2/2015    Secondary hyperparathyroidism 2017    Seizures     Sialadenitis 3/21/2018    Thrombocytopenia 2016     Past Surgical History:   Procedure Laterality Date    BIOPSY, LIVER, TRANSJUGULAR APPROACH N/A 2018    Performed by [facility] Diagnostic Provider at [facility] OR 2ND FLR    BIOPSY-LIVER N/A 2017    Performed by [facility] Provider at [facility] OR 2ND FLR    BIOPSY-LIVER N/A 3/22/2016    Performed by [facility] Diagnostic Provider at [facility] OR 2ND FLR     SECTION      x 2    CONIZATION-CERVICAL-LEEP N/A 6/15/2018    Performed [facility]    [id number]; upper extremity Left 2015    Performed by Iadlia Diaz MD at [facility] OR 2ND FLR    EMBOLIZATION, BLOOD VESSEL N/A 2018    Performed by [facility] at [facility]    Exam Under Anesthesia N/A 2018    Performed by [facility] MD at [facility] OR 2ND FLR    Exam under anesthesia N/A 2018    Performed by [facility] at [facility] OR    Exam under anesthesia (ADD ON ) N/A 2018    Performed by NICKIE [facility]varez MD at [facility]    Exam under anesthesia -cervical suturing  N/A 2018    Performed by Lei Sims III, MD at [facility]    FISTULOGRAM Left 2015    Performed by Idalia [facility] MD at [facility] CATH LAB    LIVER BIOPSY      LIVER TRANSPLANT  1992    SUTURE REPAIR,CERVIX  2018    Performed by [facility] at [facility]    TUBAL LIGATION       Family History   Problem Relation Age of Onset    Hypertension Mother     Hypertension Father     Cancer Sister     Heart attack Maternal Uncle     Melanoma Neg Hx     Breast cancer Neg Hx     Colon cancer Neg Hx     Ovarian cancer Neg Hx      Social History     Tobacco Use    Smoking status: Never Smoker    Smokeless  tobacco: Never Used   Substance Use Topics    Alcohol use: No    Drug use: No     Review of Systems   Constitutional: Negative for chills and fever.   HENT: Negative.    Eyes: Negative.    Respiratory: Negative for cough and shortness of breath.    Cardiovascular: Negative for chest pain and palpitations.   Gastrointestinal: Negative for nausea and vomiting.   Genitourinary: Negative for dysuria and hematuria.   Musculoskeletal: Negative.    Skin: Negative.    Neurological: Negative for dizziness and headaches.   Psychiatric/Behavioral: Negative.    All other systems reviewed and are negative.      Physical Exam     Initial Vitals [12/31/18 0216]   BP Pulse Resp Temp SpO2   (!) 198/137 108 18 97.6 °F (36.4 °C) 99 %      MAP       --         Physical Exam    Nursing note and vitals reviewed.  Constitutional: She appears well-developed and well-nourished. She is not diaphoretic. No distress.   HENT:   Head: Normocephalic and atraumatic.   Mouth/Throat: Uvula is midline and oropharynx is clear and moist. She has dentures. No oral lesions. No trismus in the jaw. Dental caries (multiple teeth) present. No dental abscesses or uvula swelling.   No gingival, swelling, fluctuance or exudate. No facial swelling, sublingual swelling, submandibular swelling, or drooling.   Eyes: Conjunctivae are normal. Pupils are equal, round, and reactive to light.   Neck: Normal range of motion and phonation normal. Neck supple. No stridor present.   Cardiovascular: Normal rate and intact distal pulses.   Pulmonary/Chest: No accessory muscle usage. No tachypnea. No respiratory distress.   Abdominal: She exhibits no distension. There is no tenderness.   Musculoskeletal: Normal range of motion. She exhibits no tenderness.   Neurological: She is alert and oriented to person, place, and time. She has normal strength. Gait normal. GCS eye subscore is 4. GCS verbal subscore is 5. GCS motor subscore is 6.   Skin: Skin is warm. Capillary refill  "takes less than 2 seconds.   Psychiatric: She has a normal mood and affect.         ED Course   Procedures  Labs Reviewed - No data to display       Imaging Results    None          Medical Decision Making:   ED Management:  Patient requesting discharge to go to  at 4AM. Álvaro Caba MD, Emergency Medicine Staff 3:44 AM 12/31/2018                   Labs Reviewed       Imaging Reviewed    Imaging Results    None         Medications given in ED    Medications   cloNIDine tablet 0.3 mg (0.3 mg Oral Given 12/31/18 0246)   acetaminophen tablet 1,000 mg (1,000 mg Oral Given 12/31/18 0247)   butalbital-acetaminophen-caffeine -40 mg per tablet 1 tablet (1 tablet Oral Given 12/31/18 0323)       Note was created using voice recognition software. Note may have occasional typographical errors that may not have been identified and edited despite good bonnie initial review prior to signing.       ED Course as of Jan 03 1924   Mon Dec 31, 2018   0330 Patient asleep in NAD.  [DL]   0352 Headache improved with meds given in ED.  [DL]      ED Course User Index  [DL] Álvaro Caba MD       Vitals:    12/31/18 0216 12/31/18 0324   BP: (!) 198/137 (!) 146/81   BP Location: Right arm Right arm   Patient Position: Sitting Lying   Pulse: 108 84   Resp: 18 20   Temp: 97.6 °F (36.4 °C) 97.9 °F (36.6 °C)   TempSrc: Oral Oral   SpO2: 99% 99%   Weight: 50.8 kg (112 lb)    Height: 5' 5" (1.651 m)        Clinical Impression:   The primary encounter diagnosis was Chronic dental pain. Diagnoses of Essential hypertension and Nonintractable episodic headache, unspecified headache type were also pertinent to this visit.      Disposition:   Disposition: Discharged  Condition: Stable                        Álvaro Caba MD  01/03/19 1928    "

## 2019-01-01 ENCOUNTER — OFFICE VISIT (OUTPATIENT)
Dept: INTERNAL MEDICINE | Facility: CLINIC | Age: 29
DRG: 674 | End: 2019-01-01
Payer: MEDICARE

## 2019-01-01 ENCOUNTER — OFFICE VISIT (OUTPATIENT)
Dept: TRANSPLANT | Facility: CLINIC | Age: 29
End: 2019-01-01
Payer: MEDICARE

## 2019-01-01 ENCOUNTER — TELEPHONE (OUTPATIENT)
Dept: TRANSPLANT | Facility: CLINIC | Age: 29
End: 2019-01-01

## 2019-01-01 ENCOUNTER — OFFICE VISIT (OUTPATIENT)
Dept: HEMATOLOGY/ONCOLOGY | Facility: CLINIC | Age: 29
End: 2019-01-01
Payer: MEDICARE

## 2019-01-01 ENCOUNTER — LAB VISIT (OUTPATIENT)
Dept: LAB | Facility: HOSPITAL | Age: 29
End: 2019-01-01
Attending: INTERNAL MEDICINE
Payer: MEDICARE

## 2019-01-01 ENCOUNTER — ANESTHESIA (OUTPATIENT)
Dept: SURGERY | Facility: HOSPITAL | Age: 29
DRG: 025 | End: 2019-01-01
Payer: MEDICARE

## 2019-01-01 ENCOUNTER — ANESTHESIA EVENT (OUTPATIENT)
Dept: SURGERY | Facility: HOSPITAL | Age: 29
DRG: 674 | End: 2019-01-01
Payer: MEDICARE

## 2019-01-01 ENCOUNTER — LAB VISIT (OUTPATIENT)
Dept: LAB | Facility: HOSPITAL | Age: 29
End: 2019-01-01
Payer: MEDICARE

## 2019-01-01 ENCOUNTER — HOSPITAL ENCOUNTER (OUTPATIENT)
Dept: RADIOLOGY | Facility: HOSPITAL | Age: 29
Discharge: HOME | End: 2019-03-08
Attending: SURGERY
Payer: MEDICARE

## 2019-01-01 ENCOUNTER — TELEPHONE (OUTPATIENT)
Dept: NEUROSURGERY | Facility: CLINIC | Age: 29
End: 2019-01-01

## 2019-01-01 ENCOUNTER — DOCUMENTATION ONLY (OUTPATIENT)
Dept: SURGERY | Facility: CLINIC | Age: 29
End: 2019-01-01

## 2019-01-01 ENCOUNTER — OFFICE VISIT (OUTPATIENT)
Dept: NEUROSURGERY | Facility: CLINIC | Age: 29
End: 2019-01-01
Payer: MEDICARE

## 2019-01-01 ENCOUNTER — HOSPITAL ENCOUNTER (OUTPATIENT)
Dept: RADIOLOGY | Facility: HOSPITAL | Age: 29
Discharge: HOME | End: 2019-01-11
Attending: INTERNAL MEDICINE
Payer: MEDICARE

## 2019-01-01 ENCOUNTER — NURSE TRIAGE (OUTPATIENT)
Dept: ADMINISTRATIVE | Facility: CLINIC | Age: 29
End: 2019-01-01

## 2019-01-01 ENCOUNTER — TELEPHONE (OUTPATIENT)
Dept: NEPHROLOGY | Facility: CLINIC | Age: 29
End: 2019-01-01

## 2019-01-01 ENCOUNTER — ANESTHESIA EVENT (OUTPATIENT)
Dept: SURGERY | Facility: HOSPITAL | Age: 29
DRG: 025 | End: 2019-01-01
Payer: MEDICARE

## 2019-01-01 ENCOUNTER — TELEPHONE (OUTPATIENT)
Dept: INTERNAL MEDICINE | Facility: CLINIC | Age: 29
End: 2019-01-01

## 2019-01-01 ENCOUNTER — HOSPITAL ENCOUNTER (EMERGENCY)
Facility: HOSPITAL | Age: 29
Discharge: HOME | End: 2019-05-29
Attending: EMERGENCY MEDICINE
Payer: MEDICARE

## 2019-01-01 ENCOUNTER — OUTPATIENT CASE MANAGEMENT (OUTPATIENT)
Dept: ADMINISTRATIVE | Facility: OTHER | Age: 29
End: 2019-01-01

## 2019-01-01 ENCOUNTER — HOSPITAL ENCOUNTER (INPATIENT)
Facility: HOSPITAL | Age: 29
LOS: 5 days | Discharge: HOME | DRG: 674 | End: 2019-04-02
Attending: SURGERY | Admitting: SURGERY
Payer: MEDICARE

## 2019-01-01 ENCOUNTER — LAB VISIT (OUTPATIENT)
Dept: LAB | Facility: HOSPITAL | Age: 29
End: 2019-01-01
Attending: NEUROLOGICAL SURGERY
Payer: MEDICARE

## 2019-01-01 ENCOUNTER — HOSPITAL ENCOUNTER (EMERGENCY)
Facility: HOSPITAL | Age: 29
Discharge: HOME | End: 2019-02-21
Attending: EMERGENCY MEDICINE
Payer: MEDICARE

## 2019-01-01 ENCOUNTER — TELEPHONE (OUTPATIENT)
Dept: SURGERY | Facility: CLINIC | Age: 29
End: 2019-01-01

## 2019-01-01 ENCOUNTER — HOSPITAL ENCOUNTER (EMERGENCY)
Facility: HOSPITAL | Age: 29
Discharge: HOME | End: 2019-04-05
Attending: EMERGENCY MEDICINE
Payer: MEDICARE

## 2019-01-01 ENCOUNTER — OFFICE VISIT (OUTPATIENT)
Dept: OBSTETRICS AND GYNECOLOGY | Facility: CLINIC | Age: 29
End: 2019-01-01
Payer: MEDICARE

## 2019-01-01 ENCOUNTER — HOSPITAL ENCOUNTER (OUTPATIENT)
Dept: PREADMISSION TESTING | Facility: HOSPITAL | Age: 29
Discharge: HOME | End: 2019-03-08
Attending: ANESTHESIOLOGY
Payer: MEDICARE

## 2019-01-01 ENCOUNTER — TELEPHONE (OUTPATIENT)
Dept: PREADMISSION TESTING | Facility: HOSPITAL | Age: 29
End: 2019-01-01

## 2019-01-01 ENCOUNTER — OFFICE VISIT (OUTPATIENT)
Dept: CARDIOLOGY | Facility: CLINIC | Age: 29
End: 2019-01-01
Payer: MEDICARE

## 2019-01-01 ENCOUNTER — TELEPHONE (OUTPATIENT)
Dept: SURGERY | Facility: HOSPITAL | Age: 29
End: 2019-01-01

## 2019-01-01 ENCOUNTER — TELEPHONE (OUTPATIENT)
Dept: OBSTETRICS AND GYNECOLOGY | Facility: CLINIC | Age: 29
End: 2019-01-01

## 2019-01-01 ENCOUNTER — OFFICE VISIT (OUTPATIENT)
Dept: INTERNAL MEDICINE | Facility: CLINIC | Age: 29
End: 2019-01-01
Payer: MEDICARE

## 2019-01-01 ENCOUNTER — HOSPITAL ENCOUNTER (EMERGENCY)
Facility: HOSPITAL | Age: 29
Discharge: HOME | End: 2019-08-19
Attending: EMERGENCY MEDICINE
Payer: MEDICARE

## 2019-01-01 ENCOUNTER — EXTERNAL HOME HEALTH (OUTPATIENT)
Dept: HOME HEALTH SERVICES | Facility: HOSPITAL | Age: 29
End: 2019-01-01
Payer: MEDICARE

## 2019-01-01 ENCOUNTER — HOSPITAL ENCOUNTER (INPATIENT)
Facility: HOSPITAL | Age: 29
LOS: 29 days | Discharge: NURSING, REHAB OR LONG-TERM CARE | DRG: 025 | End: 2019-05-21
Attending: NEUROLOGICAL SURGERY | Admitting: NEUROLOGICAL SURGERY
Payer: MEDICARE

## 2019-01-01 ENCOUNTER — HOSPITAL ENCOUNTER (OUTPATIENT)
Facility: HOSPITAL | Age: 29
Discharge: HOME | End: 2019-09-04
Attending: EMERGENCY MEDICINE | Admitting: EMERGENCY MEDICINE
Payer: MEDICARE

## 2019-01-01 ENCOUNTER — HOSPITAL ENCOUNTER (EMERGENCY)
Facility: HOSPITAL | Age: 29
Discharge: HOME | End: 2019-07-24
Attending: EMERGENCY MEDICINE
Payer: MEDICARE

## 2019-01-01 ENCOUNTER — CLINICAL SUPPORT (OUTPATIENT)
Dept: OPHTHALMOLOGY | Facility: CLINIC | Age: 29
End: 2019-01-01
Payer: MEDICARE

## 2019-01-01 ENCOUNTER — HOSPITAL ENCOUNTER (OUTPATIENT)
Dept: RADIOLOGY | Facility: HOSPITAL | Age: 29
Discharge: HOME | End: 2019-06-12
Attending: NEUROLOGICAL SURGERY
Payer: MEDICARE

## 2019-01-01 ENCOUNTER — TELEPHONE (OUTPATIENT)
Dept: HEMATOLOGY/ONCOLOGY | Facility: CLINIC | Age: 29
End: 2019-01-01

## 2019-01-01 ENCOUNTER — OFFICE VISIT (OUTPATIENT)
Dept: SURGERY | Facility: CLINIC | Age: 29
End: 2019-01-01
Payer: MEDICARE

## 2019-01-01 ENCOUNTER — OFFICE VISIT (OUTPATIENT)
Dept: OPHTHALMOLOGY | Facility: CLINIC | Age: 29
End: 2019-01-01
Payer: MEDICARE

## 2019-01-01 ENCOUNTER — EXTERNAL HOME HEALTH (OUTPATIENT)
Dept: HOME HEALTH SERVICES | Facility: HOSPITAL | Age: 29
End: 2019-01-01

## 2019-01-01 ENCOUNTER — HOSPITAL ENCOUNTER (OUTPATIENT)
Facility: HOSPITAL | Age: 29
Discharge: HOME | End: 2019-03-13
Attending: EMERGENCY MEDICINE | Admitting: INTERNAL MEDICINE
Payer: MEDICARE

## 2019-01-01 ENCOUNTER — HOSPITAL ENCOUNTER (OUTPATIENT)
Dept: INTERVENTIONAL RADIOLOGY/VASCULAR | Facility: HOSPITAL | Age: 29
Discharge: HOME | End: 2019-01-18
Attending: INTERNAL MEDICINE
Payer: MEDICARE

## 2019-01-01 ENCOUNTER — ANESTHESIA (OUTPATIENT)
Dept: SURGERY | Facility: HOSPITAL | Age: 29
DRG: 674 | End: 2019-01-01
Payer: MEDICARE

## 2019-01-01 ENCOUNTER — COMMITTEE REVIEW (OUTPATIENT)
Dept: TRANSPLANT | Facility: CLINIC | Age: 29
End: 2019-01-01

## 2019-01-01 ENCOUNTER — TELEPHONE (OUTPATIENT)
Dept: HEPATOLOGY | Facility: CLINIC | Age: 29
End: 2019-01-01

## 2019-01-01 ENCOUNTER — HOSPITAL ENCOUNTER (INPATIENT)
Facility: HOSPITAL | Age: 29
LOS: 4 days | Discharge: HOME HEALTH | DRG: 304 | End: 2019-05-27
Attending: EMERGENCY MEDICINE | Admitting: HOSPITALIST
Payer: MEDICARE

## 2019-01-01 ENCOUNTER — DOCUMENTATION ONLY (OUTPATIENT)
Dept: TRANSPLANT | Facility: CLINIC | Age: 29
End: 2019-01-01

## 2019-01-01 ENCOUNTER — OFFICE VISIT (OUTPATIENT)
Dept: TRANSPLANT | Facility: CLINIC | Age: 29
DRG: 674 | End: 2019-01-01
Payer: MEDICARE

## 2019-01-01 ENCOUNTER — HOSPITAL ENCOUNTER (EMERGENCY)
Facility: HOSPITAL | Age: 29
Discharge: HOME | End: 2019-08-22
Attending: EMERGENCY MEDICINE
Payer: MEDICARE

## 2019-01-01 ENCOUNTER — HOSPITAL ENCOUNTER (OUTPATIENT)
Dept: RADIOLOGY | Facility: HOSPITAL | Age: 29
Discharge: HOME | End: 2019-05-28
Attending: PHYSICIAN ASSISTANT
Payer: MEDICARE

## 2019-01-01 VITALS
DIASTOLIC BLOOD PRESSURE: 109 MMHG | SYSTOLIC BLOOD PRESSURE: 164 MMHG | RESPIRATION RATE: 18 BRPM | HEIGHT: 65 IN | WEIGHT: 111.31 LBS | BODY MASS INDEX: 18.55 KG/M2 | TEMPERATURE: 98 F

## 2019-01-01 VITALS
DIASTOLIC BLOOD PRESSURE: 136 MMHG | BODY MASS INDEX: 22.15 KG/M2 | WEIGHT: 125 LBS | OXYGEN SATURATION: 98 % | HEART RATE: 92 BPM | HEIGHT: 63 IN | TEMPERATURE: 98 F | RESPIRATION RATE: 17 BRPM | SYSTOLIC BLOOD PRESSURE: 202 MMHG

## 2019-01-01 VITALS
SYSTOLIC BLOOD PRESSURE: 118 MMHG | HEART RATE: 84 BPM | DIASTOLIC BLOOD PRESSURE: 82 MMHG | WEIGHT: 117.5 LBS | BODY MASS INDEX: 19.58 KG/M2 | HEIGHT: 65 IN | OXYGEN SATURATION: 99 %

## 2019-01-01 VITALS
RESPIRATION RATE: 18 BRPM | HEIGHT: 64 IN | DIASTOLIC BLOOD PRESSURE: 99 MMHG | SYSTOLIC BLOOD PRESSURE: 155 MMHG | BODY MASS INDEX: 21.76 KG/M2 | OXYGEN SATURATION: 99 % | TEMPERATURE: 98 F | WEIGHT: 127.44 LBS | HEART RATE: 70 BPM

## 2019-01-01 VITALS
BODY MASS INDEX: 19.12 KG/M2 | TEMPERATURE: 98 F | SYSTOLIC BLOOD PRESSURE: 199 MMHG | HEIGHT: 64 IN | HEART RATE: 74 BPM | DIASTOLIC BLOOD PRESSURE: 119 MMHG | WEIGHT: 112 LBS

## 2019-01-01 VITALS
WEIGHT: 110 LBS | BODY MASS INDEX: 18.33 KG/M2 | HEART RATE: 71 BPM | DIASTOLIC BLOOD PRESSURE: 141 MMHG | HEIGHT: 65 IN | SYSTOLIC BLOOD PRESSURE: 215 MMHG | OXYGEN SATURATION: 100 %

## 2019-01-01 VITALS
HEART RATE: 71 BPM | HEIGHT: 65 IN | WEIGHT: 115 LBS | DIASTOLIC BLOOD PRESSURE: 94 MMHG | RESPIRATION RATE: 20 BRPM | TEMPERATURE: 97 F | OXYGEN SATURATION: 97 % | SYSTOLIC BLOOD PRESSURE: 173 MMHG | BODY MASS INDEX: 19.16 KG/M2 | HEART RATE: 71 BPM | TEMPERATURE: 98 F | BODY MASS INDEX: 18.66 KG/M2 | SYSTOLIC BLOOD PRESSURE: 155 MMHG | HEIGHT: 65 IN | DIASTOLIC BLOOD PRESSURE: 109 MMHG | WEIGHT: 112 LBS

## 2019-01-01 VITALS
WEIGHT: 113.13 LBS | SYSTOLIC BLOOD PRESSURE: 192 MMHG | HEART RATE: 89 BPM | HEIGHT: 65 IN | RESPIRATION RATE: 20 BRPM | TEMPERATURE: 98 F | DIASTOLIC BLOOD PRESSURE: 110 MMHG | BODY MASS INDEX: 18.85 KG/M2 | OXYGEN SATURATION: 98 %

## 2019-01-01 VITALS
HEIGHT: 65 IN | BODY MASS INDEX: 19.03 KG/M2 | OXYGEN SATURATION: 84 % | DIASTOLIC BLOOD PRESSURE: 100 MMHG | WEIGHT: 114.19 LBS | HEART RATE: 66 BPM | SYSTOLIC BLOOD PRESSURE: 150 MMHG

## 2019-01-01 VITALS
DIASTOLIC BLOOD PRESSURE: 108 MMHG | HEART RATE: 101 BPM | BODY MASS INDEX: 19.32 KG/M2 | HEIGHT: 65 IN | SYSTOLIC BLOOD PRESSURE: 186 MMHG | WEIGHT: 115.94 LBS

## 2019-01-01 VITALS
WEIGHT: 114 LBS | SYSTOLIC BLOOD PRESSURE: 154 MMHG | DIASTOLIC BLOOD PRESSURE: 98 MMHG | TEMPERATURE: 98 F | HEIGHT: 65 IN | BODY MASS INDEX: 18.99 KG/M2 | OXYGEN SATURATION: 100 % | RESPIRATION RATE: 16 BRPM | HEART RATE: 124 BPM

## 2019-01-01 VITALS
HEIGHT: 65 IN | RESPIRATION RATE: 16 BRPM | WEIGHT: 112 LBS | SYSTOLIC BLOOD PRESSURE: 155 MMHG | HEART RATE: 73 BPM | OXYGEN SATURATION: 100 % | TEMPERATURE: 98 F | BODY MASS INDEX: 18.66 KG/M2 | DIASTOLIC BLOOD PRESSURE: 100 MMHG

## 2019-01-01 VITALS
DIASTOLIC BLOOD PRESSURE: 121 MMHG | SYSTOLIC BLOOD PRESSURE: 189 MMHG | HEIGHT: 65 IN | TEMPERATURE: 98 F | RESPIRATION RATE: 19 BRPM | BODY MASS INDEX: 18.99 KG/M2 | OXYGEN SATURATION: 96 % | WEIGHT: 114 LBS | HEART RATE: 124 BPM

## 2019-01-01 VITALS
TEMPERATURE: 96 F | HEART RATE: 98 BPM | SYSTOLIC BLOOD PRESSURE: 117 MMHG | DIASTOLIC BLOOD PRESSURE: 59 MMHG | BODY MASS INDEX: 19.58 KG/M2 | WEIGHT: 117.5 LBS | HEIGHT: 65 IN | OXYGEN SATURATION: 99 % | RESPIRATION RATE: 20 BRPM

## 2019-01-01 VITALS
HEART RATE: 80 BPM | SYSTOLIC BLOOD PRESSURE: 153 MMHG | RESPIRATION RATE: 18 BRPM | OXYGEN SATURATION: 100 % | DIASTOLIC BLOOD PRESSURE: 80 MMHG

## 2019-01-01 VITALS
WEIGHT: 116.88 LBS | HEART RATE: 83 BPM | SYSTOLIC BLOOD PRESSURE: 116 MMHG | DIASTOLIC BLOOD PRESSURE: 57 MMHG | BODY MASS INDEX: 19.47 KG/M2 | TEMPERATURE: 98 F | RESPIRATION RATE: 18 BRPM | HEIGHT: 65 IN | OXYGEN SATURATION: 97 %

## 2019-01-01 VITALS
RESPIRATION RATE: 16 BRPM | DIASTOLIC BLOOD PRESSURE: 64 MMHG | HEART RATE: 88 BPM | WEIGHT: 118 LBS | HEIGHT: 65 IN | SYSTOLIC BLOOD PRESSURE: 119 MMHG | BODY MASS INDEX: 19.66 KG/M2 | TEMPERATURE: 98 F | OXYGEN SATURATION: 100 %

## 2019-01-01 VITALS
RESPIRATION RATE: 18 BRPM | HEART RATE: 77 BPM | OXYGEN SATURATION: 100 % | DIASTOLIC BLOOD PRESSURE: 109 MMHG | TEMPERATURE: 98 F | HEIGHT: 65 IN | SYSTOLIC BLOOD PRESSURE: 161 MMHG | WEIGHT: 115.06 LBS | BODY MASS INDEX: 19.17 KG/M2

## 2019-01-01 VITALS
WEIGHT: 117.06 LBS | TEMPERATURE: 98 F | SYSTOLIC BLOOD PRESSURE: 191 MMHG | DIASTOLIC BLOOD PRESSURE: 129 MMHG | BODY MASS INDEX: 19.5 KG/M2 | RESPIRATION RATE: 18 BRPM | HEART RATE: 71 BPM | OXYGEN SATURATION: 99 % | HEIGHT: 65 IN

## 2019-01-01 VITALS
OXYGEN SATURATION: 100 % | HEART RATE: 80 BPM | TEMPERATURE: 98 F | HEIGHT: 65 IN | BODY MASS INDEX: 18.95 KG/M2 | RESPIRATION RATE: 16 BRPM | DIASTOLIC BLOOD PRESSURE: 100 MMHG | WEIGHT: 113.75 LBS | SYSTOLIC BLOOD PRESSURE: 190 MMHG

## 2019-01-01 VITALS
HEIGHT: 65 IN | SYSTOLIC BLOOD PRESSURE: 235 MMHG | BODY MASS INDEX: 18.26 KG/M2 | RESPIRATION RATE: 18 BRPM | TEMPERATURE: 98 F | WEIGHT: 109.56 LBS | OXYGEN SATURATION: 98 % | HEART RATE: 67 BPM | DIASTOLIC BLOOD PRESSURE: 142 MMHG

## 2019-01-01 VITALS
OXYGEN SATURATION: 100 % | HEIGHT: 64 IN | BODY MASS INDEX: 19.12 KG/M2 | WEIGHT: 112 LBS | RESPIRATION RATE: 17 BRPM | TEMPERATURE: 99 F | HEART RATE: 88 BPM | SYSTOLIC BLOOD PRESSURE: 172 MMHG | DIASTOLIC BLOOD PRESSURE: 115 MMHG

## 2019-01-01 VITALS
HEIGHT: 65 IN | OXYGEN SATURATION: 100 % | TEMPERATURE: 99 F | SYSTOLIC BLOOD PRESSURE: 190 MMHG | BODY MASS INDEX: 19.68 KG/M2 | DIASTOLIC BLOOD PRESSURE: 118 MMHG | RESPIRATION RATE: 18 BRPM | HEART RATE: 107 BPM | WEIGHT: 118.13 LBS

## 2019-01-01 VITALS
WEIGHT: 119.5 LBS | BODY MASS INDEX: 20.51 KG/M2 | HEART RATE: 94 BPM | OXYGEN SATURATION: 99 % | DIASTOLIC BLOOD PRESSURE: 56 MMHG | SYSTOLIC BLOOD PRESSURE: 120 MMHG

## 2019-01-01 VITALS
SYSTOLIC BLOOD PRESSURE: 156 MMHG | WEIGHT: 112.63 LBS | DIASTOLIC BLOOD PRESSURE: 96 MMHG | BODY MASS INDEX: 18.75 KG/M2

## 2019-01-01 VITALS
RESPIRATION RATE: 16 BRPM | TEMPERATURE: 98 F | SYSTOLIC BLOOD PRESSURE: 126 MMHG | HEART RATE: 78 BPM | OXYGEN SATURATION: 99 % | BODY MASS INDEX: 22.23 KG/M2 | HEIGHT: 62 IN | WEIGHT: 120.81 LBS | DIASTOLIC BLOOD PRESSURE: 68 MMHG

## 2019-01-01 VITALS
HEART RATE: 107 BPM | WEIGHT: 113.56 LBS | DIASTOLIC BLOOD PRESSURE: 100 MMHG | HEIGHT: 65 IN | BODY MASS INDEX: 18.92 KG/M2 | TEMPERATURE: 98 F | RESPIRATION RATE: 18 BRPM | SYSTOLIC BLOOD PRESSURE: 159 MMHG

## 2019-01-01 DIAGNOSIS — Z99.2 ESRD ON HEMODIALYSIS: Chronic | ICD-10-CM

## 2019-01-01 DIAGNOSIS — E83.51 HYPOCALCEMIA: ICD-10-CM

## 2019-01-01 DIAGNOSIS — G93.6 CYTOTOXIC BRAIN EDEMA: ICD-10-CM

## 2019-01-01 DIAGNOSIS — N25.81 SECONDARY HYPERPARATHYROIDISM OF RENAL ORIGIN: Primary | ICD-10-CM

## 2019-01-01 DIAGNOSIS — R10.13 EPIGASTRIC PAIN: ICD-10-CM

## 2019-01-01 DIAGNOSIS — Z94.4 LIVER TRANSPLANTED: Primary | ICD-10-CM

## 2019-01-01 DIAGNOSIS — D69.6 THROMBOCYTOPENIA: ICD-10-CM

## 2019-01-01 DIAGNOSIS — D84.9 IMMUNOSUPPRESSION: Chronic | ICD-10-CM

## 2019-01-01 DIAGNOSIS — N18.6 ESRD ON HEMODIALYSIS: Chronic | ICD-10-CM

## 2019-01-01 DIAGNOSIS — N18.6 ESRD ON HEMODIALYSIS: ICD-10-CM

## 2019-01-01 DIAGNOSIS — N18.6 ESRD ON DIALYSIS: ICD-10-CM

## 2019-01-01 DIAGNOSIS — Z94.4 LIVER REPLACED BY TRANSPLANT: Chronic | ICD-10-CM

## 2019-01-01 DIAGNOSIS — Z87.42 HISTORY OF ABNORMAL CERVICAL PAP SMEAR: Primary | ICD-10-CM

## 2019-01-01 DIAGNOSIS — Z91.148 NON COMPLIANCE W MEDICATION REGIMEN: ICD-10-CM

## 2019-01-01 DIAGNOSIS — E44.0 MODERATE PROTEIN-CALORIE MALNUTRITION: ICD-10-CM

## 2019-01-01 DIAGNOSIS — I10 HYPERTENSION, UNSPECIFIED TYPE: ICD-10-CM

## 2019-01-01 DIAGNOSIS — I16.0 HYPERTENSIVE URGENCY: Primary | ICD-10-CM

## 2019-01-01 DIAGNOSIS — Z99.2 HYPERPARATHYROIDISM DUE TO END STAGE RENAL DISEASE ON DIALYSIS: ICD-10-CM

## 2019-01-01 DIAGNOSIS — Z98.890 S/P CRANIOTOMY: ICD-10-CM

## 2019-01-01 DIAGNOSIS — R56.9 SEIZURE-LIKE ACTIVITY: ICD-10-CM

## 2019-01-01 DIAGNOSIS — D49.6 BRAIN TUMOR: ICD-10-CM

## 2019-01-01 DIAGNOSIS — H53.461 RIGHT HOMONYMOUS HEMIANOPSIA: ICD-10-CM

## 2019-01-01 DIAGNOSIS — R18.8 OTHER ASCITES: ICD-10-CM

## 2019-01-01 DIAGNOSIS — Z09 HOSPITAL DISCHARGE FOLLOW-UP: Primary | ICD-10-CM

## 2019-01-01 DIAGNOSIS — D16.4 BENIGN NEOPLASM OF BONES OF SKULL AND FACE: ICD-10-CM

## 2019-01-01 DIAGNOSIS — I15.0 RENOVASCULAR HYPERTENSION: Chronic | ICD-10-CM

## 2019-01-01 DIAGNOSIS — R79.89 ELEVATED PTHRP LEVEL: ICD-10-CM

## 2019-01-01 DIAGNOSIS — N18.6 ESRD (END STAGE RENAL DISEASE) ON DIALYSIS: ICD-10-CM

## 2019-01-01 DIAGNOSIS — D69.6 THROMBOCYTOPENIA: Primary | ICD-10-CM

## 2019-01-01 DIAGNOSIS — N18.6 HYPERPARATHYROIDISM DUE TO END STAGE RENAL DISEASE ON DIALYSIS: ICD-10-CM

## 2019-01-01 DIAGNOSIS — Z94.4 LIVER REPLACED BY TRANSPLANT: ICD-10-CM

## 2019-01-01 DIAGNOSIS — Z98.890 S/P CRANIOTOMY: Primary | ICD-10-CM

## 2019-01-01 DIAGNOSIS — N25.81 HYPERPARATHYROIDISM DUE TO END STAGE RENAL DISEASE ON DIALYSIS: ICD-10-CM

## 2019-01-01 DIAGNOSIS — M89.9 CHRONIC KIDNEY DISEASE-MINERAL AND BONE DISORDER: ICD-10-CM

## 2019-01-01 DIAGNOSIS — Z99.2 ESRD ON DIALYSIS: ICD-10-CM

## 2019-01-01 DIAGNOSIS — Z94.4 LIVER REPLACED BY TRANSPLANT: Primary | ICD-10-CM

## 2019-01-01 DIAGNOSIS — I15.0 RENOVASCULAR HYPERTENSION: Primary | Chronic | ICD-10-CM

## 2019-01-01 DIAGNOSIS — I61.0 NONTRAUMATIC SUBCORTICAL HEMORRHAGE OF LEFT CEREBRAL HEMISPHERE: ICD-10-CM

## 2019-01-01 DIAGNOSIS — E21.3 HYPERPARATHYROIDISM: Primary | ICD-10-CM

## 2019-01-01 DIAGNOSIS — I10 UNCONTROLLED HYPERTENSION: ICD-10-CM

## 2019-01-01 DIAGNOSIS — R06.02 SHORTNESS OF BREATH: ICD-10-CM

## 2019-01-01 DIAGNOSIS — R18.8 OTHER ASCITES: Primary | ICD-10-CM

## 2019-01-01 DIAGNOSIS — R79.89 ABNORMAL LFTS: ICD-10-CM

## 2019-01-01 DIAGNOSIS — N18.6 ESRD ON HEMODIALYSIS: Primary | Chronic | ICD-10-CM

## 2019-01-01 DIAGNOSIS — R05.9 COUGH: ICD-10-CM

## 2019-01-01 DIAGNOSIS — Z01.818 PREOPERATIVE TESTING: ICD-10-CM

## 2019-01-01 DIAGNOSIS — G93.9 BRAIN LESION: ICD-10-CM

## 2019-01-01 DIAGNOSIS — R53.82 CHRONIC FATIGUE: ICD-10-CM

## 2019-01-01 DIAGNOSIS — R11.2 INTRACTABLE VOMITING WITH NAUSEA, UNSPECIFIED VOMITING TYPE: Primary | ICD-10-CM

## 2019-01-01 DIAGNOSIS — N18.6 ESRD ON HEMODIALYSIS: Primary | ICD-10-CM

## 2019-01-01 DIAGNOSIS — I16.0 HYPERTENSIVE URGENCY: ICD-10-CM

## 2019-01-01 DIAGNOSIS — N18.6 ANEMIA IN ESRD (END-STAGE RENAL DISEASE): Chronic | ICD-10-CM

## 2019-01-01 DIAGNOSIS — M79.18 MUSCULOSKELETAL PAIN: Primary | ICD-10-CM

## 2019-01-01 DIAGNOSIS — M79.10 MYALGIA: ICD-10-CM

## 2019-01-01 DIAGNOSIS — E83.39 HYPERPHOSPHATEMIA: ICD-10-CM

## 2019-01-01 DIAGNOSIS — F32.A DEPRESSION, UNSPECIFIED DEPRESSION TYPE: Primary | ICD-10-CM

## 2019-01-01 DIAGNOSIS — R00.0 TACHYCARDIA: ICD-10-CM

## 2019-01-01 DIAGNOSIS — E83.9 CHRONIC KIDNEY DISEASE-MINERAL AND BONE DISORDER: ICD-10-CM

## 2019-01-01 DIAGNOSIS — Z99.2 ESRD ON HEMODIALYSIS: Primary | Chronic | ICD-10-CM

## 2019-01-01 DIAGNOSIS — Z29.89 PROPHYLACTIC IMMUNOTHERAPY: ICD-10-CM

## 2019-01-01 DIAGNOSIS — G93.9 BRAIN LESION: Primary | ICD-10-CM

## 2019-01-01 DIAGNOSIS — N18.9 CHRONIC KIDNEY DISEASE-MINERAL AND BONE DISORDER: ICD-10-CM

## 2019-01-01 DIAGNOSIS — D63.1 ANEMIA IN ESRD (END-STAGE RENAL DISEASE): ICD-10-CM

## 2019-01-01 DIAGNOSIS — Z94.4 LIVER TRANSPLANTED: ICD-10-CM

## 2019-01-01 DIAGNOSIS — I15.0 RENOVASCULAR HYPERTENSION: ICD-10-CM

## 2019-01-01 DIAGNOSIS — E86.1 HYPOVOLEMIA: ICD-10-CM

## 2019-01-01 DIAGNOSIS — R07.9 CHEST PAIN: ICD-10-CM

## 2019-01-01 DIAGNOSIS — M79.10 MYALGIA: Primary | ICD-10-CM

## 2019-01-01 DIAGNOSIS — G44.209 ACUTE NON INTRACTABLE TENSION-TYPE HEADACHE: ICD-10-CM

## 2019-01-01 DIAGNOSIS — N25.81 SECONDARY HYPERPARATHYROIDISM: ICD-10-CM

## 2019-01-01 DIAGNOSIS — I10 HTN (HYPERTENSION): Primary | ICD-10-CM

## 2019-01-01 DIAGNOSIS — Z01.818 PRE-OP TESTING: ICD-10-CM

## 2019-01-01 DIAGNOSIS — I10 HYPERTENSION: Primary | ICD-10-CM

## 2019-01-01 DIAGNOSIS — D63.1 ANEMIA IN ESRD (END-STAGE RENAL DISEASE): Chronic | ICD-10-CM

## 2019-01-01 DIAGNOSIS — R11.10 EMESIS: ICD-10-CM

## 2019-01-01 DIAGNOSIS — I61.0 NONTRAUMATIC SUBCORTICAL HEMORRHAGE OF LEFT CEREBRAL HEMISPHERE: Primary | ICD-10-CM

## 2019-01-01 DIAGNOSIS — E21.0: ICD-10-CM

## 2019-01-01 DIAGNOSIS — K92.0 HEMATEMESIS WITH NAUSEA: ICD-10-CM

## 2019-01-01 DIAGNOSIS — Z99.2 ESRD ON HEMODIALYSIS: ICD-10-CM

## 2019-01-01 DIAGNOSIS — R56.9 SEIZURES: ICD-10-CM

## 2019-01-01 DIAGNOSIS — E83.9 CHRONIC KIDNEY DISEASE-MINERAL AND BONE DISORDER: Chronic | ICD-10-CM

## 2019-01-01 DIAGNOSIS — D72.819 LEUKOPENIA, UNSPECIFIED TYPE: ICD-10-CM

## 2019-01-01 DIAGNOSIS — G93.5 BRAIN COMPRESSION: ICD-10-CM

## 2019-01-01 DIAGNOSIS — B34.9 ACUTE VIRAL SYNDROME: Primary | ICD-10-CM

## 2019-01-01 DIAGNOSIS — Z94.4 LIVER REPLACED BY TRANSPLANT: Primary | Chronic | ICD-10-CM

## 2019-01-01 DIAGNOSIS — E21.0: Primary | ICD-10-CM

## 2019-01-01 DIAGNOSIS — I10 UNCONTROLLED HYPERTENSION: Primary | ICD-10-CM

## 2019-01-01 DIAGNOSIS — Z99.2 ESRD ON HEMODIALYSIS: Primary | ICD-10-CM

## 2019-01-01 DIAGNOSIS — Z99.2 ESRD (END STAGE RENAL DISEASE) ON DIALYSIS: ICD-10-CM

## 2019-01-01 DIAGNOSIS — Z09 POSTOP CHECK: ICD-10-CM

## 2019-01-01 DIAGNOSIS — D16.4 BENIGN NEOPLASM OF BONES OF SKULL AND FACE: Primary | ICD-10-CM

## 2019-01-01 DIAGNOSIS — E83.39 HYPERPHOSPHATEMIA: Primary | ICD-10-CM

## 2019-01-01 DIAGNOSIS — R11.2 INTRACTABLE VOMITING WITH NAUSEA: ICD-10-CM

## 2019-01-01 DIAGNOSIS — Z01.818 PREOPERATIVE TESTING: Primary | ICD-10-CM

## 2019-01-01 DIAGNOSIS — Z09 HOSPITAL DISCHARGE FOLLOW-UP: ICD-10-CM

## 2019-01-01 DIAGNOSIS — F32.A DEPRESSION, UNSPECIFIED DEPRESSION TYPE: ICD-10-CM

## 2019-01-01 DIAGNOSIS — E21.3 HYPERPARATHYROIDISM: ICD-10-CM

## 2019-01-01 DIAGNOSIS — N25.81 SECONDARY HYPERPARATHYROIDISM OF RENAL ORIGIN: ICD-10-CM

## 2019-01-01 DIAGNOSIS — R51.9 HEADACHE: ICD-10-CM

## 2019-01-01 DIAGNOSIS — M89.9 CHRONIC KIDNEY DISEASE-MINERAL AND BONE DISORDER: Chronic | ICD-10-CM

## 2019-01-01 DIAGNOSIS — K59.00 CONSTIPATION: ICD-10-CM

## 2019-01-01 DIAGNOSIS — R11.2 NON-INTRACTABLE VOMITING WITH NAUSEA, UNSPECIFIED VOMITING TYPE: Primary | ICD-10-CM

## 2019-01-01 DIAGNOSIS — N18.9 CHRONIC KIDNEY DISEASE-MINERAL AND BONE DISORDER: Chronic | ICD-10-CM

## 2019-01-01 DIAGNOSIS — N18.6 ANEMIA IN ESRD (END-STAGE RENAL DISEASE): ICD-10-CM

## 2019-01-01 LAB
25(OH)D3+25(OH)D2 SERPL-MCNC: 16 NG/ML (ref 30–96)
25(OH)D3+25(OH)D2 SERPL-MCNC: 9 NG/ML (ref 30–96)
ABO + RH BLD: NORMAL
ALBUMIN SERPL BCP-MCNC: 2.2 G/DL (ref 3.5–5.2)
ALBUMIN SERPL BCP-MCNC: 2.3 G/DL (ref 3.5–5.2)
ALBUMIN SERPL BCP-MCNC: 2.3 G/DL (ref 3.5–5.2)
ALBUMIN SERPL BCP-MCNC: 2.4 G/DL (ref 3.5–5.2)
ALBUMIN SERPL BCP-MCNC: 2.5 G/DL (ref 3.5–5.2)
ALBUMIN SERPL BCP-MCNC: 2.6 G/DL (ref 3.5–5.2)
ALBUMIN SERPL BCP-MCNC: 2.7 G/DL
ALBUMIN SERPL BCP-MCNC: 2.7 G/DL (ref 3.5–5.2)
ALBUMIN SERPL BCP-MCNC: 2.8 G/DL (ref 3.5–5.2)
ALBUMIN SERPL BCP-MCNC: 2.9 G/DL (ref 3.5–5.2)
ALBUMIN SERPL BCP-MCNC: 2.9 G/DL (ref 3.5–5.2)
ALBUMIN SERPL BCP-MCNC: 3 G/DL
ALBUMIN SERPL BCP-MCNC: 3 G/DL (ref 3.5–5.2)
ALBUMIN SERPL BCP-MCNC: 3.1 G/DL (ref 3.5–5.2)
ALBUMIN SERPL BCP-MCNC: 3.2 G/DL (ref 3.5–5.2)
ALBUMIN SERPL BCP-MCNC: 3.3 G/DL (ref 3.5–5.2)
ALBUMIN SERPL BCP-MCNC: 3.4 G/DL (ref 3.5–5.2)
ALBUMIN SERPL BCP-MCNC: 3.5 G/DL (ref 3.5–5.2)
ALBUMIN SERPL BCP-MCNC: 3.7 G/DL (ref 3.5–5.2)
ALBUMIN SERPL BCP-MCNC: 3.7 G/DL (ref 3.5–5.2)
ALBUMIN SERPL BCP-MCNC: 3.8 G/DL (ref 3.5–5.2)
ALBUMIN SERPL BCP-MCNC: 4.1 G/DL (ref 3.5–5.2)
ALBUMIN SERPL BCP-MCNC: 4.4 G/DL (ref 3.5–5.2)
ALLENS TEST: ABNORMAL
ALP SERPL-CCNC: 1050 U/L (ref 55–135)
ALP SERPL-CCNC: 1143 U/L (ref 55–135)
ALP SERPL-CCNC: 353 U/L (ref 55–135)
ALP SERPL-CCNC: 363 U/L (ref 55–135)
ALP SERPL-CCNC: 365 U/L (ref 55–135)
ALP SERPL-CCNC: 369 U/L (ref 55–135)
ALP SERPL-CCNC: 373 U/L (ref 55–135)
ALP SERPL-CCNC: 376 U/L (ref 55–135)
ALP SERPL-CCNC: 376 U/L (ref 55–135)
ALP SERPL-CCNC: 381 U/L (ref 55–135)
ALP SERPL-CCNC: 387 U/L (ref 55–135)
ALP SERPL-CCNC: 394 U/L (ref 55–135)
ALP SERPL-CCNC: 398 U/L (ref 55–135)
ALP SERPL-CCNC: 401 U/L (ref 55–135)
ALP SERPL-CCNC: 405 U/L (ref 55–135)
ALP SERPL-CCNC: 411 U/L (ref 55–135)
ALP SERPL-CCNC: 412 U/L (ref 55–135)
ALP SERPL-CCNC: 415 U/L (ref 55–135)
ALP SERPL-CCNC: 416 U/L (ref 55–135)
ALP SERPL-CCNC: 417 U/L (ref 55–135)
ALP SERPL-CCNC: 431 U/L (ref 55–135)
ALP SERPL-CCNC: 433 U/L (ref 55–135)
ALP SERPL-CCNC: 444 U/L (ref 55–135)
ALP SERPL-CCNC: 460 U/L (ref 55–135)
ALP SERPL-CCNC: 462 U/L (ref 55–135)
ALP SERPL-CCNC: 467 U/L (ref 55–135)
ALP SERPL-CCNC: 473 U/L (ref 55–135)
ALP SERPL-CCNC: 482 U/L (ref 55–135)
ALP SERPL-CCNC: 486 U/L (ref 55–135)
ALP SERPL-CCNC: 491 U/L (ref 55–135)
ALP SERPL-CCNC: 497 U/L (ref 55–135)
ALP SERPL-CCNC: 499 U/L (ref 55–135)
ALP SERPL-CCNC: 500 U/L (ref 55–135)
ALP SERPL-CCNC: 501 U/L (ref 55–135)
ALP SERPL-CCNC: 508 U/L (ref 55–135)
ALP SERPL-CCNC: 511 U/L (ref 55–135)
ALP SERPL-CCNC: 513 U/L (ref 55–135)
ALP SERPL-CCNC: 514 U/L (ref 55–135)
ALP SERPL-CCNC: 519 U/L (ref 55–135)
ALP SERPL-CCNC: 521 U/L (ref 55–135)
ALP SERPL-CCNC: 530 U/L (ref 55–135)
ALP SERPL-CCNC: 533 U/L (ref 55–135)
ALP SERPL-CCNC: 545 U/L (ref 55–135)
ALP SERPL-CCNC: 562 U/L (ref 55–135)
ALP SERPL-CCNC: 563 U/L (ref 55–135)
ALP SERPL-CCNC: 572 U/L (ref 55–135)
ALP SERPL-CCNC: 602 U/L (ref 55–135)
ALP SERPL-CCNC: 722 U/L
ALP SERPL-CCNC: 722 U/L (ref 55–135)
ALP SERPL-CCNC: 760 U/L
ALP SERPL-CCNC: 765 U/L (ref 55–135)
ALP SERPL-CCNC: 765 U/L (ref 55–135)
ALP SERPL-CCNC: 768 U/L (ref 55–135)
ALP SERPL-CCNC: 777 U/L
ALP SERPL-CCNC: 777 U/L (ref 55–135)
ALP SERPL-CCNC: 861 U/L
ALP SERPL-CCNC: 933 U/L (ref 55–135)
ALT SERPL W/O P-5'-P-CCNC: 10 U/L (ref 10–44)
ALT SERPL W/O P-5'-P-CCNC: 10 U/L (ref 10–44)
ALT SERPL W/O P-5'-P-CCNC: 13 U/L (ref 10–44)
ALT SERPL W/O P-5'-P-CCNC: 14 U/L (ref 10–44)
ALT SERPL W/O P-5'-P-CCNC: 14 U/L (ref 10–44)
ALT SERPL W/O P-5'-P-CCNC: 15 U/L (ref 10–44)
ALT SERPL W/O P-5'-P-CCNC: 16 U/L (ref 10–44)
ALT SERPL W/O P-5'-P-CCNC: 17 U/L (ref 10–44)
ALT SERPL W/O P-5'-P-CCNC: 18 U/L (ref 10–44)
ALT SERPL W/O P-5'-P-CCNC: 20 U/L (ref 10–44)
ALT SERPL W/O P-5'-P-CCNC: 21 U/L (ref 10–44)
ALT SERPL W/O P-5'-P-CCNC: 22 U/L (ref 10–44)
ALT SERPL W/O P-5'-P-CCNC: 23 U/L (ref 10–44)
ALT SERPL W/O P-5'-P-CCNC: 23 U/L (ref 10–44)
ALT SERPL W/O P-5'-P-CCNC: 24 U/L (ref 10–44)
ALT SERPL W/O P-5'-P-CCNC: 25 U/L (ref 10–44)
ALT SERPL W/O P-5'-P-CCNC: 27 U/L (ref 10–44)
ALT SERPL W/O P-5'-P-CCNC: 28 U/L (ref 10–44)
ALT SERPL W/O P-5'-P-CCNC: 29 U/L (ref 10–44)
ALT SERPL W/O P-5'-P-CCNC: 30 U/L (ref 10–44)
ALT SERPL W/O P-5'-P-CCNC: 30 U/L (ref 10–44)
ALT SERPL W/O P-5'-P-CCNC: 32 U/L
ALT SERPL W/O P-5'-P-CCNC: 33 U/L
ALT SERPL W/O P-5'-P-CCNC: 34 U/L (ref 10–44)
ALT SERPL W/O P-5'-P-CCNC: 35 U/L
ALT SERPL W/O P-5'-P-CCNC: 36 U/L (ref 10–44)
ALT SERPL W/O P-5'-P-CCNC: 36 U/L (ref 10–44)
ALT SERPL W/O P-5'-P-CCNC: 39 U/L (ref 10–44)
ALT SERPL W/O P-5'-P-CCNC: 40 U/L (ref 10–44)
ALT SERPL W/O P-5'-P-CCNC: 47 U/L
ALT SERPL W/O P-5'-P-CCNC: 5 U/L (ref 10–44)
ALT SERPL W/O P-5'-P-CCNC: 6 U/L (ref 10–44)
ALT SERPL W/O P-5'-P-CCNC: 7 U/L (ref 10–44)
ALT SERPL W/O P-5'-P-CCNC: 8 U/L (ref 10–44)
ALT SERPL W/O P-5'-P-CCNC: 9 U/L (ref 10–44)
ALT SERPL W/O P-5'-P-CCNC: 9 U/L (ref 10–44)
ALT SERPL W/O P-5'-P-CCNC: <5 U/L (ref 10–44)
AMYLASE SERPL-CCNC: 90 U/L (ref 20–110)
ANION GAP SERPL CALC-SCNC: 10 MMOL/L (ref 8–16)
ANION GAP SERPL CALC-SCNC: 11 MMOL/L
ANION GAP SERPL CALC-SCNC: 11 MMOL/L
ANION GAP SERPL CALC-SCNC: 11 MMOL/L (ref 8–16)
ANION GAP SERPL CALC-SCNC: 12 MMOL/L (ref 8–16)
ANION GAP SERPL CALC-SCNC: 13 MMOL/L (ref 8–16)
ANION GAP SERPL CALC-SCNC: 14 MMOL/L (ref 8–16)
ANION GAP SERPL CALC-SCNC: 15 MMOL/L
ANION GAP SERPL CALC-SCNC: 15 MMOL/L
ANION GAP SERPL CALC-SCNC: 15 MMOL/L (ref 8–16)
ANION GAP SERPL CALC-SCNC: 16 MMOL/L (ref 8–16)
ANION GAP SERPL CALC-SCNC: 17 MMOL/L (ref 8–16)
ANION GAP SERPL CALC-SCNC: 18 MMOL/L (ref 8–16)
ANION GAP SERPL CALC-SCNC: 19 MMOL/L (ref 8–16)
ANION GAP SERPL CALC-SCNC: 21 MMOL/L (ref 8–16)
ANION GAP SERPL CALC-SCNC: 6 MMOL/L (ref 8–16)
ANION GAP SERPL CALC-SCNC: 7 MMOL/L (ref 8–16)
ANION GAP SERPL CALC-SCNC: 8 MMOL/L (ref 8–16)
ANION GAP SERPL CALC-SCNC: 9 MMOL/L (ref 8–16)
ANISOCYTOSIS BLD QL SMEAR: SLIGHT
APTT BLDCRRT: 24.9 SEC (ref 21–32)
APTT BLDCRRT: 25.2 SEC (ref 21–32)
APTT BLDCRRT: 25.6 SEC (ref 21–32)
APTT BLDCRRT: 25.7 SEC (ref 21–32)
APTT BLDCRRT: 26 SEC (ref 21–32)
APTT BLDCRRT: 26 SEC (ref 21–32)
APTT BLDCRRT: 26.1 SEC (ref 21–32)
APTT BLDCRRT: 26.3 SEC (ref 21–32)
APTT BLDCRRT: 26.4 SEC (ref 21–32)
APTT BLDCRRT: 26.9 SEC (ref 21–32)
APTT BLDCRRT: 26.9 SEC (ref 21–32)
APTT BLDCRRT: 27 SEC (ref 21–32)
APTT BLDCRRT: 27.1 SEC (ref 21–32)
APTT BLDCRRT: 27.4 SEC (ref 21–32)
APTT BLDCRRT: 27.5 SEC (ref 21–32)
APTT BLDCRRT: 27.9 SEC (ref 21–32)
APTT BLDCRRT: 27.9 SEC (ref 21–32)
AST SERPL-CCNC: 17 U/L (ref 10–40)
AST SERPL-CCNC: 19 U/L (ref 10–40)
AST SERPL-CCNC: 20 U/L (ref 10–40)
AST SERPL-CCNC: 21 U/L
AST SERPL-CCNC: 21 U/L (ref 10–40)
AST SERPL-CCNC: 22 U/L (ref 10–40)
AST SERPL-CCNC: 23 U/L (ref 10–40)
AST SERPL-CCNC: 23 U/L (ref 10–40)
AST SERPL-CCNC: 24 U/L (ref 10–40)
AST SERPL-CCNC: 25 U/L (ref 10–40)
AST SERPL-CCNC: 25 U/L (ref 10–40)
AST SERPL-CCNC: 26 U/L (ref 10–40)
AST SERPL-CCNC: 27 U/L (ref 10–40)
AST SERPL-CCNC: 28 U/L (ref 10–40)
AST SERPL-CCNC: 29 U/L (ref 10–40)
AST SERPL-CCNC: 30 U/L (ref 10–40)
AST SERPL-CCNC: 31 U/L (ref 10–40)
AST SERPL-CCNC: 32 U/L (ref 10–40)
AST SERPL-CCNC: 32 U/L (ref 10–40)
AST SERPL-CCNC: 33 U/L (ref 10–40)
AST SERPL-CCNC: 34 U/L
AST SERPL-CCNC: 34 U/L (ref 10–40)
AST SERPL-CCNC: 35 U/L (ref 10–40)
AST SERPL-CCNC: 37 U/L (ref 10–40)
AST SERPL-CCNC: 40 U/L (ref 10–40)
AST SERPL-CCNC: 45 U/L
AST SERPL-CCNC: 45 U/L (ref 10–40)
AST SERPL-CCNC: 46 U/L (ref 10–40)
AST SERPL-CCNC: 46 U/L (ref 10–40)
AST SERPL-CCNC: 48 U/L
AST SERPL-CCNC: 53 U/L (ref 10–40)
AST SERPL-CCNC: 64 U/L (ref 10–40)
BACTERIA BLD CULT: NORMAL
BASO STIPL BLD QL SMEAR: ABNORMAL
BASOPHILS # BLD AUTO: 0 K/UL (ref 0–0.2)
BASOPHILS # BLD AUTO: 0.01 K/UL
BASOPHILS # BLD AUTO: 0.01 K/UL (ref 0–0.2)
BASOPHILS # BLD AUTO: 0.02 K/UL
BASOPHILS # BLD AUTO: 0.02 K/UL
BASOPHILS # BLD AUTO: 0.02 K/UL (ref 0–0.2)
BASOPHILS # BLD AUTO: 0.03 K/UL
BASOPHILS # BLD AUTO: 0.03 K/UL (ref 0–0.2)
BASOPHILS # BLD AUTO: 0.04 K/UL (ref 0–0.2)
BASOPHILS # BLD AUTO: 0.05 K/UL (ref 0–0.2)
BASOPHILS # BLD AUTO: 0.05 K/UL (ref 0–0.2)
BASOPHILS # BLD AUTO: 0.06 K/UL (ref 0–0.2)
BASOPHILS NFR BLD: 0 % (ref 0–1.9)
BASOPHILS NFR BLD: 0.1 % (ref 0–1.9)
BASOPHILS NFR BLD: 0.2 % (ref 0–1.9)
BASOPHILS NFR BLD: 0.3 %
BASOPHILS NFR BLD: 0.3 % (ref 0–1.9)
BASOPHILS NFR BLD: 0.4 % (ref 0–1.9)
BASOPHILS NFR BLD: 0.5 %
BASOPHILS NFR BLD: 0.5 % (ref 0–1.9)
BASOPHILS NFR BLD: 0.7 %
BASOPHILS NFR BLD: 0.7 %
BASOPHILS NFR BLD: 0.7 % (ref 0–1.9)
BASOPHILS NFR BLD: 0.8 % (ref 0–1.9)
BASOPHILS NFR BLD: 1 % (ref 0–1.9)
BILIRUB SERPL-MCNC: 0.3 MG/DL (ref 0.1–1)
BILIRUB SERPL-MCNC: 0.4 MG/DL (ref 0.1–1)
BILIRUB SERPL-MCNC: 0.5 MG/DL (ref 0.1–1)
BILIRUB SERPL-MCNC: 0.6 MG/DL (ref 0.1–1)
BILIRUB SERPL-MCNC: 0.7 MG/DL
BILIRUB SERPL-MCNC: 0.7 MG/DL (ref 0.1–1)
BILIRUB SERPL-MCNC: 0.8 MG/DL (ref 0.1–1)
BILIRUB SERPL-MCNC: 0.9 MG/DL
BILIRUB SERPL-MCNC: 0.9 MG/DL
BILIRUB SERPL-MCNC: 0.9 MG/DL (ref 0.1–1)
BILIRUB SERPL-MCNC: 1 MG/DL
BILIRUB SERPL-MCNC: 1 MG/DL (ref 0.1–1)
BILIRUB SERPL-MCNC: 1.1 MG/DL (ref 0.1–1)
BLD GP AB SCN CELLS X3 SERPL QL: NORMAL
BLD PROD TYP BPU: NORMAL
BLOOD UNIT EXPIRATION DATE: NORMAL
BLOOD UNIT TYPE CODE: 5100
BLOOD UNIT TYPE CODE: 6200
BLOOD UNIT TYPE CODE: 7300
BLOOD UNIT TYPE: NORMAL
BNP SERPL-MCNC: 1269 PG/ML (ref 0–99)
BUN SERPL-MCNC: 14 MG/DL (ref 6–20)
BUN SERPL-MCNC: 16 MG/DL (ref 6–20)
BUN SERPL-MCNC: 17 MG/DL (ref 6–20)
BUN SERPL-MCNC: 18 MG/DL (ref 6–20)
BUN SERPL-MCNC: 20 MG/DL (ref 6–20)
BUN SERPL-MCNC: 22 MG/DL (ref 6–20)
BUN SERPL-MCNC: 22 MG/DL (ref 6–20)
BUN SERPL-MCNC: 23 MG/DL (ref 6–20)
BUN SERPL-MCNC: 24 MG/DL (ref 6–20)
BUN SERPL-MCNC: 24 MG/DL (ref 6–20)
BUN SERPL-MCNC: 25 MG/DL (ref 6–20)
BUN SERPL-MCNC: 25 MG/DL (ref 6–20)
BUN SERPL-MCNC: 26 MG/DL (ref 6–20)
BUN SERPL-MCNC: 27 MG/DL (ref 6–20)
BUN SERPL-MCNC: 29 MG/DL (ref 6–20)
BUN SERPL-MCNC: 30 MG/DL (ref 6–20)
BUN SERPL-MCNC: 31 MG/DL (ref 6–20)
BUN SERPL-MCNC: 32 MG/DL (ref 6–20)
BUN SERPL-MCNC: 33 MG/DL (ref 6–20)
BUN SERPL-MCNC: 33 MG/DL (ref 6–20)
BUN SERPL-MCNC: 34 MG/DL (ref 6–20)
BUN SERPL-MCNC: 36 MG/DL (ref 6–30)
BUN SERPL-MCNC: 37 MG/DL (ref 6–20)
BUN SERPL-MCNC: 38 MG/DL
BUN SERPL-MCNC: 39 MG/DL (ref 6–20)
BUN SERPL-MCNC: 39 MG/DL (ref 6–20)
BUN SERPL-MCNC: 40 MG/DL (ref 6–20)
BUN SERPL-MCNC: 41 MG/DL (ref 6–20)
BUN SERPL-MCNC: 42 MG/DL (ref 6–20)
BUN SERPL-MCNC: 46 MG/DL
BUN SERPL-MCNC: 46 MG/DL (ref 6–20)
BUN SERPL-MCNC: 46 MG/DL (ref 6–20)
BUN SERPL-MCNC: 49 MG/DL (ref 6–20)
BUN SERPL-MCNC: 52 MG/DL (ref 6–20)
BUN SERPL-MCNC: 53 MG/DL (ref 6–20)
BUN SERPL-MCNC: 55 MG/DL (ref 6–20)
BUN SERPL-MCNC: 56 MG/DL (ref 6–20)
BUN SERPL-MCNC: 57 MG/DL (ref 6–20)
BUN SERPL-MCNC: 57 MG/DL (ref 6–20)
BUN SERPL-MCNC: 59 MG/DL (ref 6–30)
BUN SERPL-MCNC: 60 MG/DL (ref 6–20)
BUN SERPL-MCNC: 60 MG/DL (ref 6–20)
BUN SERPL-MCNC: 61 MG/DL (ref 6–20)
BUN SERPL-MCNC: 63 MG/DL (ref 6–20)
BUN SERPL-MCNC: 65 MG/DL (ref 6–20)
BUN SERPL-MCNC: 65 MG/DL (ref 6–20)
BUN SERPL-MCNC: 66 MG/DL (ref 6–20)
BUN SERPL-MCNC: 66 MG/DL (ref 6–20)
BUN SERPL-MCNC: 67 MG/DL (ref 6–20)
BUN SERPL-MCNC: 69 MG/DL (ref 6–20)
BUN SERPL-MCNC: 73 MG/DL
BUN SERPL-MCNC: 73 MG/DL (ref 6–20)
BUN SERPL-MCNC: 76 MG/DL (ref 6–20)
BUN SERPL-MCNC: 77 MG/DL (ref 6–20)
BUN SERPL-MCNC: 79 MG/DL (ref 6–20)
BUN SERPL-MCNC: 80 MG/DL (ref 6–20)
BUN SERPL-MCNC: 80 MG/DL (ref 6–20)
BUN SERPL-MCNC: 85 MG/DL (ref 6–20)
BUN SERPL-MCNC: 86 MG/DL
BUN SERPL-MCNC: 88 MG/DL (ref 6–20)
BUN SERPL-MCNC: 89 MG/DL (ref 6–20)
BUN SERPL-MCNC: 90 MG/DL (ref 6–20)
BUN SERPL-MCNC: 99 MG/DL (ref 6–20)
CA-I BLDV-SCNC: 0.59 MMOL/L (ref 1.06–1.42)
CA-I BLDV-SCNC: 0.63 MMOL/L (ref 1.06–1.42)
CA-I BLDV-SCNC: 0.64 MMOL/L (ref 1.06–1.42)
CA-I BLDV-SCNC: 0.66 MMOL/L (ref 1.06–1.42)
CA-I BLDV-SCNC: 0.67 MMOL/L (ref 1.06–1.42)
CA-I BLDV-SCNC: 0.67 MMOL/L (ref 1.06–1.42)
CA-I BLDV-SCNC: 0.69 MMOL/L (ref 1.06–1.42)
CA-I BLDV-SCNC: 0.74 MMOL/L (ref 1.06–1.42)
CA-I BLDV-SCNC: 0.74 MMOL/L (ref 1.06–1.42)
CA-I BLDV-SCNC: 0.75 MMOL/L (ref 1.06–1.42)
CA-I BLDV-SCNC: 0.77 MMOL/L (ref 1.06–1.42)
CA-I BLDV-SCNC: 0.79 MMOL/L (ref 1.06–1.42)
CA-I BLDV-SCNC: 0.8 MMOL/L (ref 1.06–1.42)
CA-I BLDV-SCNC: 0.81 MMOL/L (ref 1.06–1.42)
CA-I BLDV-SCNC: 0.81 MMOL/L (ref 1.06–1.42)
CA-I BLDV-SCNC: 0.84 MMOL/L (ref 1.06–1.42)
CA-I BLDV-SCNC: 0.85 MMOL/L (ref 1.06–1.42)
CA-I BLDV-SCNC: 0.89 MMOL/L (ref 1.06–1.42)
CA-I BLDV-SCNC: 0.92 MMOL/L (ref 1.06–1.42)
CA-I BLDV-SCNC: 0.94 MMOL/L (ref 1.06–1.42)
CA-I BLDV-SCNC: 0.97 MMOL/L (ref 1.06–1.42)
CA-I BLDV-SCNC: 0.98 MMOL/L (ref 1.06–1.42)
CA-I BLDV-SCNC: 0.99 MMOL/L (ref 1.06–1.42)
CA-I BLDV-SCNC: 1.01 MMOL/L (ref 1.06–1.42)
CA-I BLDV-SCNC: 1.02 MMOL/L (ref 1.06–1.42)
CA-I BLDV-SCNC: 1.02 MMOL/L (ref 1.06–1.42)
CA-I BLDV-SCNC: 1.04 MMOL/L (ref 1.06–1.42)
CA-I BLDV-SCNC: 1.06 MMOL/L (ref 1.06–1.42)
CA-I BLDV-SCNC: 1.07 MMOL/L (ref 1.06–1.42)
CA-I BLDV-SCNC: 1.09 MMOL/L (ref 1.06–1.42)
CA-I BLDV-SCNC: 1.09 MMOL/L (ref 1.06–1.42)
CA-I BLDV-SCNC: 1.11 MMOL/L (ref 1.06–1.42)
CA-I BLDV-SCNC: 1.12 MMOL/L (ref 1.06–1.42)
CA-I BLDV-SCNC: 1.12 MMOL/L (ref 1.06–1.42)
CA-I BLDV-SCNC: 1.15 MMOL/L (ref 1.06–1.42)
CA-I BLDV-SCNC: 1.15 MMOL/L (ref 1.06–1.42)
CA-I BLDV-SCNC: 1.5 MMOL/L (ref 1.06–1.42)
CALCIUM SERPL-MCNC: 10.2 MG/DL (ref 8.7–10.5)
CALCIUM SERPL-MCNC: 10.3 MG/DL (ref 8.7–10.5)
CALCIUM SERPL-MCNC: 4.9 MG/DL (ref 8.7–10.5)
CALCIUM SERPL-MCNC: 5 MG/DL (ref 8.7–10.5)
CALCIUM SERPL-MCNC: 5.3 MG/DL (ref 8.7–10.5)
CALCIUM SERPL-MCNC: 5.4 MG/DL (ref 8.7–10.5)
CALCIUM SERPL-MCNC: 5.4 MG/DL (ref 8.7–10.5)
CALCIUM SERPL-MCNC: 5.5 MG/DL (ref 8.7–10.5)
CALCIUM SERPL-MCNC: 5.5 MG/DL (ref 8.7–10.5)
CALCIUM SERPL-MCNC: 5.6 MG/DL (ref 8.7–10.5)
CALCIUM SERPL-MCNC: 5.7 MG/DL (ref 8.7–10.5)
CALCIUM SERPL-MCNC: 5.8 MG/DL (ref 8.7–10.5)
CALCIUM SERPL-MCNC: 5.9 MG/DL (ref 8.7–10.5)
CALCIUM SERPL-MCNC: 5.9 MG/DL (ref 8.7–10.5)
CALCIUM SERPL-MCNC: 6 MG/DL (ref 8.7–10.5)
CALCIUM SERPL-MCNC: 6.3 MG/DL (ref 8.7–10.5)
CALCIUM SERPL-MCNC: 6.3 MG/DL (ref 8.7–10.5)
CALCIUM SERPL-MCNC: 6.4 MG/DL (ref 8.7–10.5)
CALCIUM SERPL-MCNC: 6.5 MG/DL (ref 8.7–10.5)
CALCIUM SERPL-MCNC: 6.5 MG/DL (ref 8.7–10.5)
CALCIUM SERPL-MCNC: 6.6 MG/DL (ref 8.7–10.5)
CALCIUM SERPL-MCNC: 6.7 MG/DL (ref 8.7–10.5)
CALCIUM SERPL-MCNC: 6.9 MG/DL (ref 8.7–10.5)
CALCIUM SERPL-MCNC: 7 MG/DL (ref 8.7–10.5)
CALCIUM SERPL-MCNC: 7.1 MG/DL (ref 8.7–10.5)
CALCIUM SERPL-MCNC: 7.1 MG/DL (ref 8.7–10.5)
CALCIUM SERPL-MCNC: 7.2 MG/DL (ref 8.7–10.5)
CALCIUM SERPL-MCNC: 7.3 MG/DL (ref 8.7–10.5)
CALCIUM SERPL-MCNC: 7.3 MG/DL (ref 8.7–10.5)
CALCIUM SERPL-MCNC: 7.4 MG/DL (ref 8.7–10.5)
CALCIUM SERPL-MCNC: 7.5 MG/DL (ref 8.7–10.5)
CALCIUM SERPL-MCNC: 7.6 MG/DL (ref 8.7–10.5)
CALCIUM SERPL-MCNC: 7.7 MG/DL (ref 8.7–10.5)
CALCIUM SERPL-MCNC: 7.8 MG/DL (ref 8.7–10.5)
CALCIUM SERPL-MCNC: 7.9 MG/DL (ref 8.7–10.5)
CALCIUM SERPL-MCNC: 8 MG/DL (ref 8.7–10.5)
CALCIUM SERPL-MCNC: 8.1 MG/DL (ref 8.7–10.5)
CALCIUM SERPL-MCNC: 8.1 MG/DL (ref 8.7–10.5)
CALCIUM SERPL-MCNC: 8.2 MG/DL (ref 8.7–10.5)
CALCIUM SERPL-MCNC: 8.3 MG/DL
CALCIUM SERPL-MCNC: 8.3 MG/DL (ref 8.7–10.5)
CALCIUM SERPL-MCNC: 8.4 MG/DL (ref 8.7–10.5)
CALCIUM SERPL-MCNC: 8.5 MG/DL (ref 8.7–10.5)
CALCIUM SERPL-MCNC: 8.6 MG/DL (ref 8.7–10.5)
CALCIUM SERPL-MCNC: 8.7 MG/DL (ref 8.7–10.5)
CALCIUM SERPL-MCNC: 8.7 MG/DL (ref 8.7–10.5)
CALCIUM SERPL-MCNC: 8.8 MG/DL
CALCIUM SERPL-MCNC: 8.8 MG/DL (ref 8.7–10.5)
CALCIUM SERPL-MCNC: 8.9 MG/DL (ref 8.7–10.5)
CALCIUM SERPL-MCNC: 9 MG/DL
CALCIUM SERPL-MCNC: 9 MG/DL
CALCIUM SERPL-MCNC: 9.1 MG/DL (ref 8.7–10.5)
CALCIUM SERPL-MCNC: 9.1 MG/DL (ref 8.7–10.5)
CALCIUM SERPL-MCNC: 9.3 MG/DL (ref 8.7–10.5)
CALCIUM SERPL-MCNC: 9.3 MG/DL (ref 8.7–10.5)
CALCIUM SERPL-MCNC: 9.6 MG/DL (ref 8.7–10.5)
CALCIUM SERPL-MCNC: 9.7 MG/DL (ref 8.7–10.5)
CHLORIDE SERPL-SCNC: 100 MMOL/L (ref 95–110)
CHLORIDE SERPL-SCNC: 101 MMOL/L
CHLORIDE SERPL-SCNC: 101 MMOL/L (ref 95–110)
CHLORIDE SERPL-SCNC: 102 MMOL/L
CHLORIDE SERPL-SCNC: 102 MMOL/L
CHLORIDE SERPL-SCNC: 102 MMOL/L (ref 95–110)
CHLORIDE SERPL-SCNC: 103 MMOL/L
CHLORIDE SERPL-SCNC: 103 MMOL/L (ref 95–110)
CHLORIDE SERPL-SCNC: 104 MMOL/L (ref 95–110)
CHLORIDE SERPL-SCNC: 105 MMOL/L (ref 95–110)
CHLORIDE SERPL-SCNC: 106 MMOL/L (ref 95–110)
CHLORIDE SERPL-SCNC: 106 MMOL/L (ref 95–110)
CHLORIDE SERPL-SCNC: 107 MMOL/L (ref 95–110)
CHLORIDE SERPL-SCNC: 95 MMOL/L (ref 95–110)
CHLORIDE SERPL-SCNC: 96 MMOL/L (ref 95–110)
CHLORIDE SERPL-SCNC: 97 MMOL/L (ref 95–110)
CHLORIDE SERPL-SCNC: 98 MMOL/L (ref 95–110)
CHLORIDE SERPL-SCNC: 99 MMOL/L (ref 95–110)
CO2 SERPL-SCNC: 16 MMOL/L (ref 23–29)
CO2 SERPL-SCNC: 17 MMOL/L (ref 23–29)
CO2 SERPL-SCNC: 18 MMOL/L (ref 23–29)
CO2 SERPL-SCNC: 19 MMOL/L (ref 23–29)
CO2 SERPL-SCNC: 20 MMOL/L (ref 23–29)
CO2 SERPL-SCNC: 21 MMOL/L
CO2 SERPL-SCNC: 21 MMOL/L
CO2 SERPL-SCNC: 21 MMOL/L (ref 23–29)
CO2 SERPL-SCNC: 22 MMOL/L (ref 23–29)
CO2 SERPL-SCNC: 23 MMOL/L
CO2 SERPL-SCNC: 23 MMOL/L (ref 23–29)
CO2 SERPL-SCNC: 24 MMOL/L (ref 23–29)
CO2 SERPL-SCNC: 25 MMOL/L (ref 23–29)
CO2 SERPL-SCNC: 26 MMOL/L
CO2 SERPL-SCNC: 26 MMOL/L (ref 23–29)
CO2 SERPL-SCNC: 27 MMOL/L (ref 23–29)
CO2 SERPL-SCNC: 28 MMOL/L (ref 23–29)
CO2 SERPL-SCNC: 30 MMOL/L (ref 23–29)
CODING SYSTEM: NORMAL
CREAT SERPL-MCNC: 10.2 MG/DL (ref 0.5–1.4)
CREAT SERPL-MCNC: 10.6 MG/DL
CREAT SERPL-MCNC: 11.1 MG/DL (ref 0.5–1.4)
CREAT SERPL-MCNC: 11.8 MG/DL
CREAT SERPL-MCNC: 3.5 MG/DL (ref 0.5–1.4)
CREAT SERPL-MCNC: 3.8 MG/DL (ref 0.5–1.4)
CREAT SERPL-MCNC: 3.9 MG/DL (ref 0.5–1.4)
CREAT SERPL-MCNC: 4 MG/DL (ref 0.5–1.4)
CREAT SERPL-MCNC: 4 MG/DL (ref 0.5–1.4)
CREAT SERPL-MCNC: 4.1 MG/DL (ref 0.5–1.4)
CREAT SERPL-MCNC: 4.2 MG/DL (ref 0.5–1.4)
CREAT SERPL-MCNC: 4.3 MG/DL (ref 0.5–1.4)
CREAT SERPL-MCNC: 4.3 MG/DL (ref 0.5–1.4)
CREAT SERPL-MCNC: 4.6 MG/DL (ref 0.5–1.4)
CREAT SERPL-MCNC: 4.6 MG/DL (ref 0.5–1.4)
CREAT SERPL-MCNC: 4.9 MG/DL (ref 0.5–1.4)
CREAT SERPL-MCNC: 4.9 MG/DL (ref 0.5–1.4)
CREAT SERPL-MCNC: 5.2 MG/DL (ref 0.5–1.4)
CREAT SERPL-MCNC: 5.3 MG/DL (ref 0.5–1.4)
CREAT SERPL-MCNC: 5.3 MG/DL (ref 0.5–1.4)
CREAT SERPL-MCNC: 5.4 MG/DL (ref 0.5–1.4)
CREAT SERPL-MCNC: 5.5 MG/DL (ref 0.5–1.4)
CREAT SERPL-MCNC: 5.5 MG/DL (ref 0.5–1.4)
CREAT SERPL-MCNC: 5.6 MG/DL (ref 0.5–1.4)
CREAT SERPL-MCNC: 5.7 MG/DL (ref 0.5–1.4)
CREAT SERPL-MCNC: 5.8 MG/DL (ref 0.5–1.4)
CREAT SERPL-MCNC: 6.2 MG/DL (ref 0.5–1.4)
CREAT SERPL-MCNC: 6.2 MG/DL (ref 0.5–1.4)
CREAT SERPL-MCNC: 6.3 MG/DL (ref 0.5–1.4)
CREAT SERPL-MCNC: 6.4 MG/DL (ref 0.5–1.4)
CREAT SERPL-MCNC: 6.4 MG/DL (ref 0.5–1.4)
CREAT SERPL-MCNC: 6.5 MG/DL (ref 0.5–1.4)
CREAT SERPL-MCNC: 6.6 MG/DL (ref 0.5–1.4)
CREAT SERPL-MCNC: 6.6 MG/DL (ref 0.5–1.4)
CREAT SERPL-MCNC: 6.7 MG/DL (ref 0.5–1.4)
CREAT SERPL-MCNC: 6.8 MG/DL (ref 0.5–1.4)
CREAT SERPL-MCNC: 6.9 MG/DL (ref 0.5–1.4)
CREAT SERPL-MCNC: 7.1 MG/DL (ref 0.5–1.4)
CREAT SERPL-MCNC: 7.2 MG/DL (ref 0.5–1.4)
CREAT SERPL-MCNC: 7.3 MG/DL (ref 0.5–1.4)
CREAT SERPL-MCNC: 7.3 MG/DL (ref 0.5–1.4)
CREAT SERPL-MCNC: 7.5 MG/DL (ref 0.5–1.4)
CREAT SERPL-MCNC: 7.6 MG/DL (ref 0.5–1.4)
CREAT SERPL-MCNC: 7.7 MG/DL (ref 0.5–1.4)
CREAT SERPL-MCNC: 7.8 MG/DL (ref 0.5–1.4)
CREAT SERPL-MCNC: 7.9 MG/DL (ref 0.5–1.4)
CREAT SERPL-MCNC: 8 MG/DL (ref 0.5–1.4)
CREAT SERPL-MCNC: 8.1 MG/DL
CREAT SERPL-MCNC: 8.1 MG/DL (ref 0.5–1.4)
CREAT SERPL-MCNC: 8.2 MG/DL
CREAT SERPL-MCNC: 8.2 MG/DL (ref 0.5–1.4)
CREAT SERPL-MCNC: 8.3 MG/DL (ref 0.5–1.4)
CREAT SERPL-MCNC: 8.4 MG/DL (ref 0.5–1.4)
CREAT SERPL-MCNC: 8.5 MG/DL (ref 0.5–1.4)
CREAT SERPL-MCNC: 8.6 MG/DL (ref 0.5–1.4)
CREAT SERPL-MCNC: 8.7 MG/DL (ref 0.5–1.4)
CREAT SERPL-MCNC: 8.8 MG/DL (ref 0.5–1.4)
CREAT SERPL-MCNC: 8.9 MG/DL (ref 0.5–1.4)
CREAT SERPL-MCNC: 9.3 MG/DL (ref 0.5–1.4)
DACRYOCYTES BLD QL SMEAR: ABNORMAL
DELSYS: ABNORMAL
DIFFERENTIAL METHOD: ABNORMAL
DISPENSE STATUS: NORMAL
DOHLE BOD BLD QL SMEAR: PRESENT
ENTEROVIRUS: NOT DETECTED
EOSINOPHIL # BLD AUTO: 0 K/UL (ref 0–0.5)
EOSINOPHIL # BLD AUTO: 0.1 K/UL (ref 0–0.5)
EOSINOPHIL # BLD AUTO: 0.2 K/UL
EOSINOPHIL # BLD AUTO: 0.2 K/UL (ref 0–0.5)
EOSINOPHIL # BLD AUTO: 0.3 K/UL (ref 0–0.5)
EOSINOPHIL # BLD AUTO: 0.4 K/UL
EOSINOPHIL # BLD AUTO: 0.4 K/UL (ref 0–0.5)
EOSINOPHIL # BLD AUTO: 0.5 K/UL (ref 0–0.5)
EOSINOPHIL NFR BLD: 0 % (ref 0–8)
EOSINOPHIL NFR BLD: 0.1 % (ref 0–8)
EOSINOPHIL NFR BLD: 0.1 % (ref 0–8)
EOSINOPHIL NFR BLD: 0.2 % (ref 0–8)
EOSINOPHIL NFR BLD: 0.3 % (ref 0–8)
EOSINOPHIL NFR BLD: 0.3 % (ref 0–8)
EOSINOPHIL NFR BLD: 0.7 % (ref 0–8)
EOSINOPHIL NFR BLD: 0.7 % (ref 0–8)
EOSINOPHIL NFR BLD: 1 % (ref 0–8)
EOSINOPHIL NFR BLD: 1.1 % (ref 0–8)
EOSINOPHIL NFR BLD: 1.1 % (ref 0–8)
EOSINOPHIL NFR BLD: 10.3 %
EOSINOPHIL NFR BLD: 10.3 % (ref 0–8)
EOSINOPHIL NFR BLD: 10.6 % (ref 0–8)
EOSINOPHIL NFR BLD: 12 %
EOSINOPHIL NFR BLD: 12.7 % (ref 0–8)
EOSINOPHIL NFR BLD: 13 % (ref 0–8)
EOSINOPHIL NFR BLD: 13.8 %
EOSINOPHIL NFR BLD: 13.8 % (ref 0–8)
EOSINOPHIL NFR BLD: 2.9 % (ref 0–8)
EOSINOPHIL NFR BLD: 3 % (ref 0–8)
EOSINOPHIL NFR BLD: 3.7 % (ref 0–8)
EOSINOPHIL NFR BLD: 4 % (ref 0–8)
EOSINOPHIL NFR BLD: 4 % (ref 0–8)
EOSINOPHIL NFR BLD: 4.1 % (ref 0–8)
EOSINOPHIL NFR BLD: 4.6 % (ref 0–8)
EOSINOPHIL NFR BLD: 4.9 % (ref 0–8)
EOSINOPHIL NFR BLD: 5.1 % (ref 0–8)
EOSINOPHIL NFR BLD: 5.4 % (ref 0–8)
EOSINOPHIL NFR BLD: 5.5 % (ref 0–8)
EOSINOPHIL NFR BLD: 5.6 % (ref 0–8)
EOSINOPHIL NFR BLD: 5.7 % (ref 0–8)
EOSINOPHIL NFR BLD: 5.7 % (ref 0–8)
EOSINOPHIL NFR BLD: 5.8 % (ref 0–8)
EOSINOPHIL NFR BLD: 6.2 % (ref 0–8)
EOSINOPHIL NFR BLD: 6.3 % (ref 0–8)
EOSINOPHIL NFR BLD: 6.4 % (ref 0–8)
EOSINOPHIL NFR BLD: 6.5 %
EOSINOPHIL NFR BLD: 6.5 % (ref 0–8)
EOSINOPHIL NFR BLD: 6.7 % (ref 0–8)
EOSINOPHIL NFR BLD: 6.8 % (ref 0–8)
EOSINOPHIL NFR BLD: 6.9 % (ref 0–8)
EOSINOPHIL NFR BLD: 7.1 % (ref 0–8)
EOSINOPHIL NFR BLD: 7.2 % (ref 0–8)
EOSINOPHIL NFR BLD: 7.3 % (ref 0–8)
EOSINOPHIL NFR BLD: 7.7 % (ref 0–8)
EOSINOPHIL NFR BLD: 8.5 % (ref 0–8)
EOSINOPHIL NFR BLD: 8.5 % (ref 0–8)
EOSINOPHIL NFR BLD: 8.6 % (ref 0–8)
EOSINOPHIL NFR BLD: 8.7 % (ref 0–8)
EOSINOPHIL NFR BLD: 9.9 % (ref 0–8)
ERYTHROCYTE [DISTWIDTH] IN BLOOD BY AUTOMATED COUNT: 16.9 % (ref 11.5–14.5)
ERYTHROCYTE [DISTWIDTH] IN BLOOD BY AUTOMATED COUNT: 17.2 % (ref 11.5–14.5)
ERYTHROCYTE [DISTWIDTH] IN BLOOD BY AUTOMATED COUNT: 17.3 % (ref 11.5–14.5)
ERYTHROCYTE [DISTWIDTH] IN BLOOD BY AUTOMATED COUNT: 17.4 % (ref 11.5–14.5)
ERYTHROCYTE [DISTWIDTH] IN BLOOD BY AUTOMATED COUNT: 17.5 % (ref 11.5–14.5)
ERYTHROCYTE [DISTWIDTH] IN BLOOD BY AUTOMATED COUNT: 17.6 % (ref 11.5–14.5)
ERYTHROCYTE [DISTWIDTH] IN BLOOD BY AUTOMATED COUNT: 17.7 % (ref 11.5–14.5)
ERYTHROCYTE [DISTWIDTH] IN BLOOD BY AUTOMATED COUNT: 17.7 % (ref 11.5–14.5)
ERYTHROCYTE [DISTWIDTH] IN BLOOD BY AUTOMATED COUNT: 17.8 % (ref 11.5–14.5)
ERYTHROCYTE [DISTWIDTH] IN BLOOD BY AUTOMATED COUNT: 18 % (ref 11.5–14.5)
ERYTHROCYTE [DISTWIDTH] IN BLOOD BY AUTOMATED COUNT: 18.2 % (ref 11.5–14.5)
ERYTHROCYTE [DISTWIDTH] IN BLOOD BY AUTOMATED COUNT: 18.3 % (ref 11.5–14.5)
ERYTHROCYTE [DISTWIDTH] IN BLOOD BY AUTOMATED COUNT: 18.4 % (ref 11.5–14.5)
ERYTHROCYTE [DISTWIDTH] IN BLOOD BY AUTOMATED COUNT: 18.4 % (ref 11.5–14.5)
ERYTHROCYTE [DISTWIDTH] IN BLOOD BY AUTOMATED COUNT: 18.5 % (ref 11.5–14.5)
ERYTHROCYTE [DISTWIDTH] IN BLOOD BY AUTOMATED COUNT: 18.6 % (ref 11.5–14.5)
ERYTHROCYTE [DISTWIDTH] IN BLOOD BY AUTOMATED COUNT: 18.6 % (ref 11.5–14.5)
ERYTHROCYTE [DISTWIDTH] IN BLOOD BY AUTOMATED COUNT: 18.8 % (ref 11.5–14.5)
ERYTHROCYTE [DISTWIDTH] IN BLOOD BY AUTOMATED COUNT: 18.9 % (ref 11.5–14.5)
ERYTHROCYTE [DISTWIDTH] IN BLOOD BY AUTOMATED COUNT: 19 % (ref 11.5–14.5)
ERYTHROCYTE [DISTWIDTH] IN BLOOD BY AUTOMATED COUNT: 19.1 % (ref 11.5–14.5)
ERYTHROCYTE [DISTWIDTH] IN BLOOD BY AUTOMATED COUNT: 19.2 % (ref 11.5–14.5)
ERYTHROCYTE [DISTWIDTH] IN BLOOD BY AUTOMATED COUNT: 19.2 % (ref 11.5–14.5)
ERYTHROCYTE [DISTWIDTH] IN BLOOD BY AUTOMATED COUNT: 19.3 % (ref 11.5–14.5)
ERYTHROCYTE [DISTWIDTH] IN BLOOD BY AUTOMATED COUNT: 19.4 % (ref 11.5–14.5)
ERYTHROCYTE [DISTWIDTH] IN BLOOD BY AUTOMATED COUNT: 19.5 % (ref 11.5–14.5)
ERYTHROCYTE [DISTWIDTH] IN BLOOD BY AUTOMATED COUNT: 19.5 % (ref 11.5–14.5)
ERYTHROCYTE [DISTWIDTH] IN BLOOD BY AUTOMATED COUNT: 19.6 %
ERYTHROCYTE [DISTWIDTH] IN BLOOD BY AUTOMATED COUNT: 19.6 %
ERYTHROCYTE [DISTWIDTH] IN BLOOD BY AUTOMATED COUNT: 19.6 % (ref 11.5–14.5)
ERYTHROCYTE [DISTWIDTH] IN BLOOD BY AUTOMATED COUNT: 19.7 %
ERYTHROCYTE [DISTWIDTH] IN BLOOD BY AUTOMATED COUNT: 19.7 %
ERYTHROCYTE [DISTWIDTH] IN BLOOD BY AUTOMATED COUNT: 19.7 % (ref 11.5–14.5)
ERYTHROCYTE [DISTWIDTH] IN BLOOD BY AUTOMATED COUNT: 19.8 % (ref 11.5–14.5)
ERYTHROCYTE [DISTWIDTH] IN BLOOD BY AUTOMATED COUNT: 19.8 % (ref 11.5–14.5)
ERYTHROCYTE [DISTWIDTH] IN BLOOD BY AUTOMATED COUNT: 20.3 % (ref 11.5–14.5)
ERYTHROCYTE [DISTWIDTH] IN BLOOD BY AUTOMATED COUNT: 20.3 % (ref 11.5–14.5)
ERYTHROCYTE [DISTWIDTH] IN BLOOD BY AUTOMATED COUNT: 20.5 % (ref 11.5–14.5)
ERYTHROCYTE [DISTWIDTH] IN BLOOD BY AUTOMATED COUNT: 20.6 % (ref 11.5–14.5)
ERYTHROCYTE [DISTWIDTH] IN BLOOD BY AUTOMATED COUNT: 21.1 % (ref 11.5–14.5)
ERYTHROCYTE [DISTWIDTH] IN BLOOD BY AUTOMATED COUNT: 21.4 % (ref 11.5–14.5)
ERYTHROCYTE [DISTWIDTH] IN BLOOD BY AUTOMATED COUNT: 21.5 % (ref 11.5–14.5)
ERYTHROCYTE [DISTWIDTH] IN BLOOD BY AUTOMATED COUNT: 21.7 % (ref 11.5–14.5)
ERYTHROCYTE [DISTWIDTH] IN BLOOD BY AUTOMATED COUNT: 21.7 % (ref 11.5–14.5)
ERYTHROCYTE [SEDIMENTATION RATE] IN BLOOD BY WESTERGREN METHOD: 12 MM/H
ERYTHROCYTE [SEDIMENTATION RATE] IN BLOOD BY WESTERGREN METHOD: 14 MM/H
EST. GFR  (AFRICAN AMERICAN): 10.6 ML/MIN/1.73 M^2
EST. GFR  (AFRICAN AMERICAN): 10.8 ML/MIN/1.73 M^2
EST. GFR  (AFRICAN AMERICAN): 11 ML/MIN/1.73 M^2
EST. GFR  (AFRICAN AMERICAN): 11.3 ML/MIN/1.73 M^2
EST. GFR  (AFRICAN AMERICAN): 11.3 ML/MIN/1.73 M^2
EST. GFR  (AFRICAN AMERICAN): 11.5 ML/MIN/1.73 M^2
EST. GFR  (AFRICAN AMERICAN): 11.8 ML/MIN/1.73 M^2
EST. GFR  (AFRICAN AMERICAN): 11.8 ML/MIN/1.73 M^2
EST. GFR  (AFRICAN AMERICAN): 12.1 ML/MIN/1.73 M^2
EST. GFR  (AFRICAN AMERICAN): 13 ML/MIN/1.73 M^2
EST. GFR  (AFRICAN AMERICAN): 13 ML/MIN/1.73 M^2
EST. GFR  (AFRICAN AMERICAN): 14 ML/MIN/1.73 M^2
EST. GFR  (AFRICAN AMERICAN): 14 ML/MIN/1.73 M^2
EST. GFR  (AFRICAN AMERICAN): 15.2 ML/MIN/1.73 M^2
EST. GFR  (AFRICAN AMERICAN): 15.2 ML/MIN/1.73 M^2
EST. GFR  (AFRICAN AMERICAN): 15.6 ML/MIN/1.73 M^2
EST. GFR  (AFRICAN AMERICAN): 16.1 ML/MIN/1.73 M^2
EST. GFR  (AFRICAN AMERICAN): 16.6 ML/MIN/1.73 M^2
EST. GFR  (AFRICAN AMERICAN): 16.6 ML/MIN/1.73 M^2
EST. GFR  (AFRICAN AMERICAN): 17.1 ML/MIN/1.73 M^2
EST. GFR  (AFRICAN AMERICAN): 17.6 ML/MIN/1.73 M^2
EST. GFR  (AFRICAN AMERICAN): 19.5 ML/MIN/1.73 M^2
EST. GFR  (AFRICAN AMERICAN): 4.5 ML/MIN/1.73 M^2
EST. GFR  (AFRICAN AMERICAN): 4.8 ML/MIN/1.73 M^2
EST. GFR  (AFRICAN AMERICAN): 5.1 ML/MIN/1.73 M^2
EST. GFR  (AFRICAN AMERICAN): 6 ML/MIN/1.73 M^2
EST. GFR  (AFRICAN AMERICAN): 6.3 ML/MIN/1.73 M^2
EST. GFR  (AFRICAN AMERICAN): 6.4 ML/MIN/1.73 M^2
EST. GFR  (AFRICAN AMERICAN): 6.5 ML/MIN/1.73 M^2
EST. GFR  (AFRICAN AMERICAN): 6.6 ML/MIN/1.73 M^2
EST. GFR  (AFRICAN AMERICAN): 6.7 ML/MIN/1.73 M^2
EST. GFR  (AFRICAN AMERICAN): 6.8 ML/MIN/1.73 M^2
EST. GFR  (AFRICAN AMERICAN): 6.9 ML/MIN/1.73 M^2
EST. GFR  (AFRICAN AMERICAN): 7 ML/MIN/1.73 M^2
EST. GFR  (AFRICAN AMERICAN): 7 ML/MIN/1.73 M^2
EST. GFR  (AFRICAN AMERICAN): 7.1 ML/MIN/1.73 M^2
EST. GFR  (AFRICAN AMERICAN): 7.1 ML/MIN/1.73 M^2
EST. GFR  (AFRICAN AMERICAN): 7.2 ML/MIN/1.73 M^2
EST. GFR  (AFRICAN AMERICAN): 7.3 ML/MIN/1.73 M^2
EST. GFR  (AFRICAN AMERICAN): 7.4 ML/MIN/1.73 M^2
EST. GFR  (AFRICAN AMERICAN): 7.6 ML/MIN/1.73 M^2
EST. GFR  (AFRICAN AMERICAN): 7.8 ML/MIN/1.73 M^2
EST. GFR  (AFRICAN AMERICAN): 8 ML/MIN/1.73 M^2
EST. GFR  (AFRICAN AMERICAN): 8 ML/MIN/1.73 M^2
EST. GFR  (AFRICAN AMERICAN): 8.1 ML/MIN/1.73 M^2
EST. GFR  (AFRICAN AMERICAN): 8.3 ML/MIN/1.73 M^2
EST. GFR  (AFRICAN AMERICAN): 8.6 ML/MIN/1.73 M^2
EST. GFR  (AFRICAN AMERICAN): 8.7 ML/MIN/1.73 M^2
EST. GFR  (AFRICAN AMERICAN): 8.9 ML/MIN/1.73 M^2
EST. GFR  (AFRICAN AMERICAN): 9 ML/MIN/1.73 M^2
EST. GFR  (AFRICAN AMERICAN): 9 ML/MIN/1.73 M^2
EST. GFR  (AFRICAN AMERICAN): 9.2 ML/MIN/1.73 M^2
EST. GFR  (AFRICAN AMERICAN): 9.4 ML/MIN/1.73 M^2
EST. GFR  (AFRICAN AMERICAN): 9.4 ML/MIN/1.73 M^2
EST. GFR  (AFRICAN AMERICAN): 9.6 ML/MIN/1.73 M^2
EST. GFR  (AFRICAN AMERICAN): 9.8 ML/MIN/1.73 M^2
EST. GFR  (AFRICAN AMERICAN): 9.8 ML/MIN/1.73 M^2
EST. GFR  (NON AFRICAN AMERICAN): 10 ML/MIN/1.73 M^2
EST. GFR  (NON AFRICAN AMERICAN): 10.2 ML/MIN/1.73 M^2
EST. GFR  (NON AFRICAN AMERICAN): 10.2 ML/MIN/1.73 M^2
EST. GFR  (NON AFRICAN AMERICAN): 10.5 ML/MIN/1.73 M^2
EST. GFR  (NON AFRICAN AMERICAN): 11.3 ML/MIN/1.73 M^2
EST. GFR  (NON AFRICAN AMERICAN): 11.3 ML/MIN/1.73 M^2
EST. GFR  (NON AFRICAN AMERICAN): 12.1 ML/MIN/1.73 M^2
EST. GFR  (NON AFRICAN AMERICAN): 12.1 ML/MIN/1.73 M^2
EST. GFR  (NON AFRICAN AMERICAN): 13.2 ML/MIN/1.73 M^2
EST. GFR  (NON AFRICAN AMERICAN): 13.2 ML/MIN/1.73 M^2
EST. GFR  (NON AFRICAN AMERICAN): 13.6 ML/MIN/1.73 M^2
EST. GFR  (NON AFRICAN AMERICAN): 14 ML/MIN/1.73 M^2
EST. GFR  (NON AFRICAN AMERICAN): 14.4 ML/MIN/1.73 M^2
EST. GFR  (NON AFRICAN AMERICAN): 14.4 ML/MIN/1.73 M^2
EST. GFR  (NON AFRICAN AMERICAN): 14.8 ML/MIN/1.73 M^2
EST. GFR  (NON AFRICAN AMERICAN): 15.3 ML/MIN/1.73 M^2
EST. GFR  (NON AFRICAN AMERICAN): 16.9 ML/MIN/1.73 M^2
EST. GFR  (NON AFRICAN AMERICAN): 3.9 ML/MIN/1.73 M^2
EST. GFR  (NON AFRICAN AMERICAN): 4.2 ML/MIN/1.73 M^2
EST. GFR  (NON AFRICAN AMERICAN): 4.4 ML/MIN/1.73 M^2
EST. GFR  (NON AFRICAN AMERICAN): 5.2 ML/MIN/1.73 M^2
EST. GFR  (NON AFRICAN AMERICAN): 5.5 ML/MIN/1.73 M^2
EST. GFR  (NON AFRICAN AMERICAN): 5.5 ML/MIN/1.73 M^2
EST. GFR  (NON AFRICAN AMERICAN): 5.6 ML/MIN/1.73 M^2
EST. GFR  (NON AFRICAN AMERICAN): 5.7 ML/MIN/1.73 M^2
EST. GFR  (NON AFRICAN AMERICAN): 5.8 ML/MIN/1.73 M^2
EST. GFR  (NON AFRICAN AMERICAN): 5.9 ML/MIN/1.73 M^2
EST. GFR  (NON AFRICAN AMERICAN): 6 ML/MIN/1.73 M^2
EST. GFR  (NON AFRICAN AMERICAN): 6 ML/MIN/1.73 M^2
EST. GFR  (NON AFRICAN AMERICAN): 6.1 ML/MIN/1.73 M^2
EST. GFR  (NON AFRICAN AMERICAN): 6.1 ML/MIN/1.73 M^2
EST. GFR  (NON AFRICAN AMERICAN): 6.2 ML/MIN/1.73 M^2
EST. GFR  (NON AFRICAN AMERICAN): 6.3 ML/MIN/1.73 M^2
EST. GFR  (NON AFRICAN AMERICAN): 6.4 ML/MIN/1.73 M^2
EST. GFR  (NON AFRICAN AMERICAN): 6.6 ML/MIN/1.73 M^2
EST. GFR  (NON AFRICAN AMERICAN): 6.7 ML/MIN/1.73 M^2
EST. GFR  (NON AFRICAN AMERICAN): 6.9 ML/MIN/1.73 M^2
EST. GFR  (NON AFRICAN AMERICAN): 6.9 ML/MIN/1.73 M^2
EST. GFR  (NON AFRICAN AMERICAN): 7.1 ML/MIN/1.73 M^2
EST. GFR  (NON AFRICAN AMERICAN): 7.2 ML/MIN/1.73 M^2
EST. GFR  (NON AFRICAN AMERICAN): 7.4 ML/MIN/1.73 M^2
EST. GFR  (NON AFRICAN AMERICAN): 7.6 ML/MIN/1.73 M^2
EST. GFR  (NON AFRICAN AMERICAN): 7.7 ML/MIN/1.73 M^2
EST. GFR  (NON AFRICAN AMERICAN): 7.8 ML/MIN/1.73 M^2
EST. GFR  (NON AFRICAN AMERICAN): 7.8 ML/MIN/1.73 M^2
EST. GFR  (NON AFRICAN AMERICAN): 8 ML/MIN/1.73 M^2
EST. GFR  (NON AFRICAN AMERICAN): 8.1 ML/MIN/1.73 M^2
EST. GFR  (NON AFRICAN AMERICAN): 8.1 ML/MIN/1.73 M^2
EST. GFR  (NON AFRICAN AMERICAN): 8.3 ML/MIN/1.73 M^2
EST. GFR  (NON AFRICAN AMERICAN): 8.5 ML/MIN/1.73 M^2
EST. GFR  (NON AFRICAN AMERICAN): 8.5 ML/MIN/1.73 M^2
EST. GFR  (NON AFRICAN AMERICAN): 9.2 ML/MIN/1.73 M^2
EST. GFR  (NON AFRICAN AMERICAN): 9.4 ML/MIN/1.73 M^2
EST. GFR  (NON AFRICAN AMERICAN): 9.6 ML/MIN/1.73 M^2
EST. GFR  (NON AFRICAN AMERICAN): 9.8 ML/MIN/1.73 M^2
EST. GFR  (NON AFRICAN AMERICAN): 9.8 ML/MIN/1.73 M^2
ESTIMATED AVG GLUCOSE: 88 MG/DL (ref 68–131)
ESTIMATED AVG GLUCOSE: 94 MG/DL (ref 68–131)
FERRITIN SERPL-MCNC: 905 NG/ML (ref 20–300)
FIBRINOGEN PPP-MCNC: 265 MG/DL (ref 182–366)
FIBRINOGEN PPP-MCNC: 268 MG/DL (ref 182–366)
FIBRINOGEN PPP-MCNC: 296 MG/DL (ref 182–366)
FIBRINOGEN PPP-MCNC: 313 MG/DL (ref 182–366)
FIBRINOGEN PPP-MCNC: 368 MG/DL (ref 182–366)
FIBRINOGEN PPP-MCNC: 394 MG/DL (ref 182–366)
FIBRINOGEN PPP-MCNC: 397 MG/DL (ref 182–366)
FIBRINOGEN PPP-MCNC: 418 MG/DL (ref 182–366)
FIBRINOGEN PPP-MCNC: 420 MG/DL (ref 182–366)
FIBRINOGEN PPP-MCNC: 451 MG/DL (ref 182–366)
FIBRINOGEN PPP-MCNC: 465 MG/DL (ref 182–366)
FIBRINOGEN PPP-MCNC: 467 MG/DL (ref 182–366)
FIBRINOGEN PPP-MCNC: 468 MG/DL (ref 182–366)
FIBRINOGEN PPP-MCNC: 472 MG/DL (ref 182–366)
FIBRINOGEN PPP-MCNC: 477 MG/DL (ref 182–366)
FIBRINOGEN PPP-MCNC: 497 MG/DL (ref 182–366)
FIBRINOGEN PPP-MCNC: 504 MG/DL (ref 182–366)
FIBRINOGEN PPP-MCNC: 510 MG/DL (ref 182–366)
FIBRINOGEN PPP-MCNC: 510 MG/DL (ref 182–366)
FIBRINOGEN PPP-MCNC: 548 MG/DL (ref 182–366)
FIO2: 28
FIO2: 40
FIO2: 50
FLOW: 2
GGT SERPL-CCNC: 673 U/L (ref 8–55)
GLUCOSE SERPL-MCNC: 101 MG/DL (ref 70–110)
GLUCOSE SERPL-MCNC: 102 MG/DL (ref 70–110)
GLUCOSE SERPL-MCNC: 105 MG/DL (ref 70–110)
GLUCOSE SERPL-MCNC: 105 MG/DL (ref 70–110)
GLUCOSE SERPL-MCNC: 106 MG/DL (ref 70–110)
GLUCOSE SERPL-MCNC: 107 MG/DL (ref 70–110)
GLUCOSE SERPL-MCNC: 108 MG/DL (ref 70–110)
GLUCOSE SERPL-MCNC: 108 MG/DL (ref 70–110)
GLUCOSE SERPL-MCNC: 109 MG/DL (ref 70–110)
GLUCOSE SERPL-MCNC: 112 MG/DL (ref 70–110)
GLUCOSE SERPL-MCNC: 112 MG/DL (ref 70–110)
GLUCOSE SERPL-MCNC: 113 MG/DL (ref 70–110)
GLUCOSE SERPL-MCNC: 114 MG/DL (ref 70–110)
GLUCOSE SERPL-MCNC: 114 MG/DL (ref 70–110)
GLUCOSE SERPL-MCNC: 115 MG/DL (ref 70–110)
GLUCOSE SERPL-MCNC: 116 MG/DL (ref 70–110)
GLUCOSE SERPL-MCNC: 117 MG/DL (ref 70–110)
GLUCOSE SERPL-MCNC: 118 MG/DL (ref 70–110)
GLUCOSE SERPL-MCNC: 119 MG/DL (ref 70–110)
GLUCOSE SERPL-MCNC: 120 MG/DL (ref 70–110)
GLUCOSE SERPL-MCNC: 120 MG/DL (ref 70–110)
GLUCOSE SERPL-MCNC: 121 MG/DL (ref 70–110)
GLUCOSE SERPL-MCNC: 121 MG/DL (ref 70–110)
GLUCOSE SERPL-MCNC: 122 MG/DL (ref 70–110)
GLUCOSE SERPL-MCNC: 123 MG/DL (ref 70–110)
GLUCOSE SERPL-MCNC: 124 MG/DL (ref 70–110)
GLUCOSE SERPL-MCNC: 124 MG/DL (ref 70–110)
GLUCOSE SERPL-MCNC: 125 MG/DL (ref 70–110)
GLUCOSE SERPL-MCNC: 126 MG/DL (ref 70–110)
GLUCOSE SERPL-MCNC: 127 MG/DL (ref 70–110)
GLUCOSE SERPL-MCNC: 129 MG/DL (ref 70–110)
GLUCOSE SERPL-MCNC: 129 MG/DL (ref 70–110)
GLUCOSE SERPL-MCNC: 130 MG/DL (ref 70–110)
GLUCOSE SERPL-MCNC: 131 MG/DL (ref 70–110)
GLUCOSE SERPL-MCNC: 131 MG/DL (ref 70–110)
GLUCOSE SERPL-MCNC: 134 MG/DL (ref 70–110)
GLUCOSE SERPL-MCNC: 136 MG/DL (ref 70–110)
GLUCOSE SERPL-MCNC: 137 MG/DL (ref 70–110)
GLUCOSE SERPL-MCNC: 138 MG/DL (ref 70–110)
GLUCOSE SERPL-MCNC: 138 MG/DL (ref 70–110)
GLUCOSE SERPL-MCNC: 139 MG/DL (ref 70–110)
GLUCOSE SERPL-MCNC: 141 MG/DL (ref 70–110)
GLUCOSE SERPL-MCNC: 142 MG/DL (ref 70–110)
GLUCOSE SERPL-MCNC: 144 MG/DL (ref 70–110)
GLUCOSE SERPL-MCNC: 145 MG/DL (ref 70–110)
GLUCOSE SERPL-MCNC: 148 MG/DL (ref 70–110)
GLUCOSE SERPL-MCNC: 149 MG/DL (ref 70–110)
GLUCOSE SERPL-MCNC: 150 MG/DL (ref 70–110)
GLUCOSE SERPL-MCNC: 151 MG/DL (ref 70–110)
GLUCOSE SERPL-MCNC: 151 MG/DL (ref 70–110)
GLUCOSE SERPL-MCNC: 152 MG/DL (ref 70–110)
GLUCOSE SERPL-MCNC: 155 MG/DL (ref 70–110)
GLUCOSE SERPL-MCNC: 155 MG/DL (ref 70–110)
GLUCOSE SERPL-MCNC: 157 MG/DL (ref 70–110)
GLUCOSE SERPL-MCNC: 161 MG/DL (ref 70–110)
GLUCOSE SERPL-MCNC: 163 MG/DL (ref 70–110)
GLUCOSE SERPL-MCNC: 169 MG/DL (ref 70–110)
GLUCOSE SERPL-MCNC: 173 MG/DL (ref 70–110)
GLUCOSE SERPL-MCNC: 204 MG/DL (ref 70–110)
GLUCOSE SERPL-MCNC: 213 MG/DL (ref 70–110)
GLUCOSE SERPL-MCNC: 80 MG/DL
GLUCOSE SERPL-MCNC: 85 MG/DL
GLUCOSE SERPL-MCNC: 90 MG/DL
GLUCOSE SERPL-MCNC: 91 MG/DL (ref 70–110)
GLUCOSE SERPL-MCNC: 91 MG/DL (ref 70–110)
GLUCOSE SERPL-MCNC: 92 MG/DL (ref 70–110)
GLUCOSE SERPL-MCNC: 94 MG/DL (ref 70–110)
GLUCOSE SERPL-MCNC: 95 MG/DL (ref 70–110)
GLUCOSE SERPL-MCNC: 95 MG/DL (ref 70–110)
GLUCOSE SERPL-MCNC: 96 MG/DL
GLUCOSE SERPL-MCNC: 98 MG/DL (ref 70–110)
GLUCOSE SERPL-MCNC: 99 MG/DL (ref 70–110)
HBA1C MFR BLD HPLC: 4.7 % (ref 4–5.6)
HBA1C MFR BLD HPLC: 4.9 % (ref 4–5.6)
HBV SURFACE AG SERPL QL IA: NEGATIVE
HCG INTACT+B SERPL-ACNC: <1.2 MIU/ML
HCG INTACT+B SERPL-ACNC: <1.2 MIU/ML
HCO3 UR-SCNC: 18.3 MMOL/L (ref 24–28)
HCO3 UR-SCNC: 19.6 MMOL/L (ref 24–28)
HCO3 UR-SCNC: 20 MMOL/L (ref 24–28)
HCO3 UR-SCNC: 21.1 MMOL/L (ref 24–28)
HCO3 UR-SCNC: 21.3 MMOL/L (ref 24–28)
HCO3 UR-SCNC: 22.6 MMOL/L (ref 24–28)
HCO3 UR-SCNC: 25.8 MMOL/L (ref 24–28)
HCT VFR BLD AUTO: 21.4 % (ref 37–48.5)
HCT VFR BLD AUTO: 22.3 % (ref 37–48.5)
HCT VFR BLD AUTO: 22.6 % (ref 37–48.5)
HCT VFR BLD AUTO: 22.6 % (ref 37–48.5)
HCT VFR BLD AUTO: 23.1 % (ref 37–48.5)
HCT VFR BLD AUTO: 23.2 % (ref 37–48.5)
HCT VFR BLD AUTO: 23.3 % (ref 37–48.5)
HCT VFR BLD AUTO: 23.4 % (ref 37–48.5)
HCT VFR BLD AUTO: 23.4 % (ref 37–48.5)
HCT VFR BLD AUTO: 23.7 % (ref 37–48.5)
HCT VFR BLD AUTO: 23.7 % (ref 37–48.5)
HCT VFR BLD AUTO: 23.8 % (ref 37–48.5)
HCT VFR BLD AUTO: 23.9 % (ref 37–48.5)
HCT VFR BLD AUTO: 24 % (ref 37–48.5)
HCT VFR BLD AUTO: 24 % (ref 37–48.5)
HCT VFR BLD AUTO: 24.2 % (ref 37–48.5)
HCT VFR BLD AUTO: 24.4 % (ref 37–48.5)
HCT VFR BLD AUTO: 24.6 % (ref 37–48.5)
HCT VFR BLD AUTO: 24.9 % (ref 37–48.5)
HCT VFR BLD AUTO: 25.1 % (ref 37–48.5)
HCT VFR BLD AUTO: 25.2 % (ref 37–48.5)
HCT VFR BLD AUTO: 25.3 % (ref 37–48.5)
HCT VFR BLD AUTO: 25.4 % (ref 37–48.5)
HCT VFR BLD AUTO: 25.8 % (ref 37–48.5)
HCT VFR BLD AUTO: 25.9 % (ref 37–48.5)
HCT VFR BLD AUTO: 26 % (ref 37–48.5)
HCT VFR BLD AUTO: 26.2 % (ref 37–48.5)
HCT VFR BLD AUTO: 26.3 % (ref 37–48.5)
HCT VFR BLD AUTO: 26.5 % (ref 37–48.5)
HCT VFR BLD AUTO: 26.7 % (ref 37–48.5)
HCT VFR BLD AUTO: 27 % (ref 37–48.5)
HCT VFR BLD AUTO: 27 % (ref 37–48.5)
HCT VFR BLD AUTO: 27.5 % (ref 37–48.5)
HCT VFR BLD AUTO: 27.7 % (ref 37–48.5)
HCT VFR BLD AUTO: 27.8 % (ref 37–48.5)
HCT VFR BLD AUTO: 27.9 % (ref 37–48.5)
HCT VFR BLD AUTO: 28.3 % (ref 37–48.5)
HCT VFR BLD AUTO: 28.6 % (ref 37–48.5)
HCT VFR BLD AUTO: 29 % (ref 37–48.5)
HCT VFR BLD AUTO: 29.1 %
HCT VFR BLD AUTO: 29.2 % (ref 37–48.5)
HCT VFR BLD AUTO: 29.3 % (ref 37–48.5)
HCT VFR BLD AUTO: 29.4 % (ref 37–48.5)
HCT VFR BLD AUTO: 29.5 % (ref 37–48.5)
HCT VFR BLD AUTO: 29.7 %
HCT VFR BLD AUTO: 30.5 % (ref 37–48.5)
HCT VFR BLD AUTO: 31.1 % (ref 37–48.5)
HCT VFR BLD AUTO: 31.5 % (ref 37–48.5)
HCT VFR BLD AUTO: 32.1 % (ref 37–48.5)
HCT VFR BLD AUTO: 32.3 % (ref 37–48.5)
HCT VFR BLD AUTO: 32.7 %
HCT VFR BLD AUTO: 33 %
HCT VFR BLD AUTO: 33.3 % (ref 37–48.5)
HCT VFR BLD AUTO: 33.6 % (ref 37–48.5)
HCT VFR BLD AUTO: 33.6 % (ref 37–48.5)
HCT VFR BLD AUTO: 33.8 % (ref 37–48.5)
HCT VFR BLD AUTO: 34 % (ref 37–48.5)
HCT VFR BLD AUTO: 34.6 % (ref 37–48.5)
HCT VFR BLD AUTO: 37.2 % (ref 37–48.5)
HCT VFR BLD AUTO: 37.5 % (ref 37–48.5)
HCT VFR BLD AUTO: 38.8 % (ref 37–48.5)
HCT VFR BLD AUTO: 39.8 % (ref 37–48.5)
HCT VFR BLD AUTO: 40.7 % (ref 37–48.5)
HCT VFR BLD AUTO: 41.9 % (ref 37–48.5)
HCT VFR BLD AUTO: 45.6 % (ref 37–48.5)
HCT VFR BLD AUTO: 48.3 % (ref 37–48.5)
HCT VFR BLD CALC: 23 %PCV (ref 36–54)
HCT VFR BLD CALC: 25 %PCV (ref 36–54)
HCT VFR BLD CALC: 28 %PCV (ref 36–54)
HCT VFR BLD CALC: 31 %PCV (ref 36–54)
HCT VFR BLD CALC: 32 %PCV (ref 36–54)
HCT VFR BLD CALC: 33 %PCV (ref 36–54)
HEPARIN INDUCED THROMBOCYTOPENIA: NORMAL
HGB BLD-MCNC: 10 G/DL (ref 12–16)
HGB BLD-MCNC: 10.1 G/DL (ref 12–16)
HGB BLD-MCNC: 10.3 G/DL
HGB BLD-MCNC: 10.4 G/DL (ref 12–16)
HGB BLD-MCNC: 10.5 G/DL (ref 12–16)
HGB BLD-MCNC: 10.6 G/DL (ref 12–16)
HGB BLD-MCNC: 10.7 G/DL
HGB BLD-MCNC: 10.8 G/DL (ref 12–16)
HGB BLD-MCNC: 10.9 G/DL (ref 12–16)
HGB BLD-MCNC: 11.1 G/DL (ref 12–16)
HGB BLD-MCNC: 11.3 G/DL (ref 12–16)
HGB BLD-MCNC: 11.3 G/DL (ref 12–16)
HGB BLD-MCNC: 11.6 G/DL (ref 12–16)
HGB BLD-MCNC: 11.7 G/DL (ref 12–16)
HGB BLD-MCNC: 11.9 G/DL (ref 12–16)
HGB BLD-MCNC: 12.5 G/DL (ref 12–16)
HGB BLD-MCNC: 12.8 G/DL (ref 12–16)
HGB BLD-MCNC: 13.2 G/DL (ref 12–16)
HGB BLD-MCNC: 15.2 G/DL (ref 12–16)
HGB BLD-MCNC: 15.8 G/DL (ref 12–16)
HGB BLD-MCNC: 6.8 G/DL (ref 12–16)
HGB BLD-MCNC: 7.1 G/DL (ref 12–16)
HGB BLD-MCNC: 7.3 G/DL (ref 12–16)
HGB BLD-MCNC: 7.3 G/DL (ref 12–16)
HGB BLD-MCNC: 7.4 G/DL (ref 12–16)
HGB BLD-MCNC: 7.5 G/DL (ref 12–16)
HGB BLD-MCNC: 7.6 G/DL (ref 12–16)
HGB BLD-MCNC: 7.7 G/DL (ref 12–16)
HGB BLD-MCNC: 7.7 G/DL (ref 12–16)
HGB BLD-MCNC: 7.9 G/DL (ref 12–16)
HGB BLD-MCNC: 8 G/DL (ref 12–16)
HGB BLD-MCNC: 8 G/DL (ref 12–16)
HGB BLD-MCNC: 8.1 G/DL (ref 12–16)
HGB BLD-MCNC: 8.2 G/DL (ref 12–16)
HGB BLD-MCNC: 8.2 G/DL (ref 12–16)
HGB BLD-MCNC: 8.3 G/DL (ref 12–16)
HGB BLD-MCNC: 8.4 G/DL (ref 12–16)
HGB BLD-MCNC: 8.4 G/DL (ref 12–16)
HGB BLD-MCNC: 8.7 G/DL (ref 12–16)
HGB BLD-MCNC: 8.9 G/DL (ref 12–16)
HGB BLD-MCNC: 9 G/DL (ref 12–16)
HGB BLD-MCNC: 9.1 G/DL (ref 12–16)
HGB BLD-MCNC: 9.2 G/DL (ref 12–16)
HGB BLD-MCNC: 9.2 G/DL (ref 12–16)
HGB BLD-MCNC: 9.3 G/DL
HGB BLD-MCNC: 9.3 G/DL (ref 12–16)
HGB BLD-MCNC: 9.4 G/DL (ref 12–16)
HGB BLD-MCNC: 9.5 G/DL (ref 12–16)
HGB BLD-MCNC: 9.7 G/DL
HGB BLD-MCNC: 9.8 G/DL (ref 12–16)
HGB BLD-MCNC: 9.9 G/DL (ref 12–16)
HPV HR 12 DNA CVX QL NAA+PROBE: POSITIVE
HPV16 AG SPEC QL: NEGATIVE
HPV18 DNA SPEC QL NAA+PROBE: NEGATIVE
HUMAN BOCAVIRUS: NOT DETECTED
HUMAN CORONAVIRUS, COMMON COLD VIRUS: NOT DETECTED
HYPOCHROMIA BLD QL SMEAR: ABNORMAL
IMM GRANULOCYTES # BLD AUTO: 0 K/UL (ref 0–0.04)
IMM GRANULOCYTES # BLD AUTO: 0.01 K/UL
IMM GRANULOCYTES # BLD AUTO: 0.01 K/UL (ref 0–0.04)
IMM GRANULOCYTES # BLD AUTO: 0.02 K/UL
IMM GRANULOCYTES # BLD AUTO: 0.02 K/UL
IMM GRANULOCYTES # BLD AUTO: 0.02 K/UL (ref 0–0.04)
IMM GRANULOCYTES # BLD AUTO: 0.03 K/UL
IMM GRANULOCYTES # BLD AUTO: 0.03 K/UL (ref 0–0.04)
IMM GRANULOCYTES # BLD AUTO: 0.04 K/UL (ref 0–0.04)
IMM GRANULOCYTES # BLD AUTO: 0.05 K/UL (ref 0–0.04)
IMM GRANULOCYTES # BLD AUTO: 0.06 K/UL (ref 0–0.04)
IMM GRANULOCYTES # BLD AUTO: 0.07 K/UL (ref 0–0.04)
IMM GRANULOCYTES # BLD AUTO: 0.08 K/UL (ref 0–0.04)
IMM GRANULOCYTES # BLD AUTO: 0.09 K/UL (ref 0–0.04)
IMM GRANULOCYTES # BLD AUTO: 0.1 K/UL (ref 0–0.04)
IMM GRANULOCYTES # BLD AUTO: 0.11 K/UL (ref 0–0.04)
IMM GRANULOCYTES # BLD AUTO: 0.15 K/UL (ref 0–0.04)
IMM GRANULOCYTES # BLD AUTO: 0.15 K/UL (ref 0–0.04)
IMM GRANULOCYTES # BLD AUTO: 0.17 K/UL (ref 0–0.04)
IMM GRANULOCYTES # BLD AUTO: 0.21 K/UL (ref 0–0.04)
IMM GRANULOCYTES # BLD AUTO: 0.27 K/UL (ref 0–0.04)
IMM GRANULOCYTES # BLD AUTO: 0.28 K/UL (ref 0–0.04)
IMM GRANULOCYTES # BLD AUTO: 0.34 K/UL (ref 0–0.04)
IMM GRANULOCYTES # BLD AUTO: ABNORMAL K/UL (ref 0–0.04)
IMM GRANULOCYTES NFR BLD AUTO: 0 % (ref 0–0.5)
IMM GRANULOCYTES NFR BLD AUTO: 0.3 %
IMM GRANULOCYTES NFR BLD AUTO: 0.3 % (ref 0–0.5)
IMM GRANULOCYTES NFR BLD AUTO: 0.4 % (ref 0–0.5)
IMM GRANULOCYTES NFR BLD AUTO: 0.5 % (ref 0–0.5)
IMM GRANULOCYTES NFR BLD AUTO: 0.6 % (ref 0–0.5)
IMM GRANULOCYTES NFR BLD AUTO: 0.7 %
IMM GRANULOCYTES NFR BLD AUTO: 0.7 % (ref 0–0.5)
IMM GRANULOCYTES NFR BLD AUTO: 0.8 % (ref 0–0.5)
IMM GRANULOCYTES NFR BLD AUTO: 0.9 % (ref 0–0.5)
IMM GRANULOCYTES NFR BLD AUTO: 1 % (ref 0–0.5)
IMM GRANULOCYTES NFR BLD AUTO: 1 % (ref 0–0.5)
IMM GRANULOCYTES NFR BLD AUTO: 1.1 % (ref 0–0.5)
IMM GRANULOCYTES NFR BLD AUTO: 1.2 % (ref 0–0.5)
IMM GRANULOCYTES NFR BLD AUTO: 1.3 % (ref 0–0.5)
IMM GRANULOCYTES NFR BLD AUTO: 1.5 % (ref 0–0.5)
IMM GRANULOCYTES NFR BLD AUTO: 1.6 % (ref 0–0.5)
IMM GRANULOCYTES NFR BLD AUTO: 1.8 % (ref 0–0.5)
IMM GRANULOCYTES NFR BLD AUTO: 1.9 % (ref 0–0.5)
IMM GRANULOCYTES NFR BLD AUTO: 1.9 % (ref 0–0.5)
IMM GRANULOCYTES NFR BLD AUTO: 2 % (ref 0–0.5)
IMM GRANULOCYTES NFR BLD AUTO: 2 % (ref 0–0.5)
IMM GRANULOCYTES NFR BLD AUTO: 2.1 % (ref 0–0.5)
IMM GRANULOCYTES NFR BLD AUTO: 2.1 % (ref 0–0.5)
IMM GRANULOCYTES NFR BLD AUTO: 2.2 % (ref 0–0.5)
IMM GRANULOCYTES NFR BLD AUTO: 2.3 % (ref 0–0.5)
IMM GRANULOCYTES NFR BLD AUTO: 2.5 % (ref 0–0.5)
IMM GRANULOCYTES NFR BLD AUTO: 2.6 % (ref 0–0.5)
IMM GRANULOCYTES NFR BLD AUTO: 3 % (ref 0–0.5)
IMM GRANULOCYTES NFR BLD AUTO: 3.1 % (ref 0–0.5)
IMM GRANULOCYTES NFR BLD AUTO: 4.6 % (ref 0–0.5)
IMM GRANULOCYTES NFR BLD AUTO: ABNORMAL % (ref 0–0.5)
INFLUENZA A - H1N1-09: NOT DETECTED
INFLUENZA A, MOLECULAR: NEGATIVE
INFLUENZA B, MOLECULAR: NEGATIVE
INR PPP: 1.1 (ref 0.8–1.2)
INR PPP: 1.2 (ref 0.8–1.2)
INR PPP: 1.3 (ref 0.8–1.2)
IRON SERPL-MCNC: 49 UG/DL (ref 30–160)
LACOSAMIDE: 2.5 MCG/ML (ref 1–10)
LACTATE SERPL-SCNC: 0.8 MMOL/L (ref 0.5–2.2)
LACTATE SERPL-SCNC: 1 MMOL/L (ref 0.5–2.2)
LACTATE SERPL-SCNC: 1.1 MMOL/L
LACTATE SERPL-SCNC: 1.6 MMOL/L (ref 0.5–2.2)
LDH SERPL L TO P-CCNC: 208 U/L (ref 110–260)
LEVETIRACETAM SERPL-MCNC: 37 UG/ML (ref 3–60)
LEVETIRACETAM SERPL-MCNC: 40.4 UG/ML (ref 3–60)
LIPASE SERPL-CCNC: 31 U/L
LIPASE SERPL-CCNC: 33 U/L (ref 4–60)
LIPASE SERPL-CCNC: 34 U/L (ref 4–60)
LIPASE SERPL-CCNC: 43 U/L (ref 4–60)
LIPASE SERPL-CCNC: 71 U/L (ref 4–60)
LYMPHOCYTES # BLD AUTO: 0.3 K/UL (ref 1–4.8)
LYMPHOCYTES # BLD AUTO: 0.4 K/UL (ref 1–4.8)
LYMPHOCYTES # BLD AUTO: 0.5 K/UL
LYMPHOCYTES # BLD AUTO: 0.5 K/UL
LYMPHOCYTES # BLD AUTO: 0.5 K/UL (ref 1–4.8)
LYMPHOCYTES # BLD AUTO: 0.6 K/UL
LYMPHOCYTES # BLD AUTO: 0.6 K/UL (ref 1–4.8)
LYMPHOCYTES # BLD AUTO: 0.7 K/UL
LYMPHOCYTES # BLD AUTO: 0.7 K/UL (ref 1–4.8)
LYMPHOCYTES # BLD AUTO: 0.7 K/UL (ref 1–4.8)
LYMPHOCYTES # BLD AUTO: 0.8 K/UL (ref 1–4.8)
LYMPHOCYTES # BLD AUTO: 0.9 K/UL (ref 1–4.8)
LYMPHOCYTES # BLD AUTO: 1 K/UL (ref 1–4.8)
LYMPHOCYTES # BLD AUTO: 1.1 K/UL (ref 1–4.8)
LYMPHOCYTES # BLD AUTO: 1.2 K/UL (ref 1–4.8)
LYMPHOCYTES # BLD AUTO: 1.3 K/UL (ref 1–4.8)
LYMPHOCYTES NFR BLD: 10.2 % (ref 18–48)
LYMPHOCYTES NFR BLD: 10.7 % (ref 18–48)
LYMPHOCYTES NFR BLD: 11.7 % (ref 18–48)
LYMPHOCYTES NFR BLD: 12.1 % (ref 18–48)
LYMPHOCYTES NFR BLD: 12.3 % (ref 18–48)
LYMPHOCYTES NFR BLD: 12.7 % (ref 18–48)
LYMPHOCYTES NFR BLD: 12.9 % (ref 18–48)
LYMPHOCYTES NFR BLD: 13 % (ref 18–48)
LYMPHOCYTES NFR BLD: 13 % (ref 18–48)
LYMPHOCYTES NFR BLD: 13.1 % (ref 18–48)
LYMPHOCYTES NFR BLD: 13.4 % (ref 18–48)
LYMPHOCYTES NFR BLD: 13.5 %
LYMPHOCYTES NFR BLD: 13.8 % (ref 18–48)
LYMPHOCYTES NFR BLD: 14.1 % (ref 18–48)
LYMPHOCYTES NFR BLD: 14.2 % (ref 18–48)
LYMPHOCYTES NFR BLD: 14.3 % (ref 18–48)
LYMPHOCYTES NFR BLD: 14.6 % (ref 18–48)
LYMPHOCYTES NFR BLD: 14.8 % (ref 18–48)
LYMPHOCYTES NFR BLD: 15.3 % (ref 18–48)
LYMPHOCYTES NFR BLD: 16 % (ref 18–48)
LYMPHOCYTES NFR BLD: 16.1 %
LYMPHOCYTES NFR BLD: 16.1 % (ref 18–48)
LYMPHOCYTES NFR BLD: 16.1 % (ref 18–48)
LYMPHOCYTES NFR BLD: 16.4 % (ref 18–48)
LYMPHOCYTES NFR BLD: 16.6 % (ref 18–48)
LYMPHOCYTES NFR BLD: 17.4 % (ref 18–48)
LYMPHOCYTES NFR BLD: 17.7 %
LYMPHOCYTES NFR BLD: 17.8 % (ref 18–48)
LYMPHOCYTES NFR BLD: 17.8 % (ref 18–48)
LYMPHOCYTES NFR BLD: 18.1 % (ref 18–48)
LYMPHOCYTES NFR BLD: 18.4 %
LYMPHOCYTES NFR BLD: 18.9 % (ref 18–48)
LYMPHOCYTES NFR BLD: 19 % (ref 18–48)
LYMPHOCYTES NFR BLD: 19.1 % (ref 18–48)
LYMPHOCYTES NFR BLD: 19.2 % (ref 18–48)
LYMPHOCYTES NFR BLD: 19.7 % (ref 18–48)
LYMPHOCYTES NFR BLD: 21 % (ref 18–48)
LYMPHOCYTES NFR BLD: 21.2 % (ref 18–48)
LYMPHOCYTES NFR BLD: 21.5 % (ref 18–48)
LYMPHOCYTES NFR BLD: 21.5 % (ref 18–48)
LYMPHOCYTES NFR BLD: 22.5 % (ref 18–48)
LYMPHOCYTES NFR BLD: 23 % (ref 18–48)
LYMPHOCYTES NFR BLD: 24.8 % (ref 18–48)
LYMPHOCYTES NFR BLD: 25.2 % (ref 18–48)
LYMPHOCYTES NFR BLD: 26 % (ref 18–48)
LYMPHOCYTES NFR BLD: 28.2 % (ref 18–48)
LYMPHOCYTES NFR BLD: 28.5 % (ref 18–48)
LYMPHOCYTES NFR BLD: 4.9 % (ref 18–48)
LYMPHOCYTES NFR BLD: 5.2 % (ref 18–48)
LYMPHOCYTES NFR BLD: 5.3 % (ref 18–48)
LYMPHOCYTES NFR BLD: 5.6 % (ref 18–48)
LYMPHOCYTES NFR BLD: 5.6 % (ref 18–48)
LYMPHOCYTES NFR BLD: 5.9 % (ref 18–48)
LYMPHOCYTES NFR BLD: 6.1 % (ref 18–48)
LYMPHOCYTES NFR BLD: 6.4 % (ref 18–48)
LYMPHOCYTES NFR BLD: 6.9 % (ref 18–48)
LYMPHOCYTES NFR BLD: 7.2 % (ref 18–48)
LYMPHOCYTES NFR BLD: 7.3 % (ref 18–48)
LYMPHOCYTES NFR BLD: 7.3 % (ref 18–48)
LYMPHOCYTES NFR BLD: 7.5 % (ref 18–48)
LYMPHOCYTES NFR BLD: 7.8 % (ref 18–48)
LYMPHOCYTES NFR BLD: 8 % (ref 18–48)
LYMPHOCYTES NFR BLD: 8 % (ref 18–48)
LYMPHOCYTES NFR BLD: 8.6 % (ref 18–48)
LYMPHOCYTES NFR BLD: 9 % (ref 18–48)
LYMPHOCYTES NFR BLD: 9.1 % (ref 18–48)
MAGNESIUM SERPL-MCNC: 1.7 MG/DL (ref 1.6–2.6)
MAGNESIUM SERPL-MCNC: 1.8 MG/DL (ref 1.6–2.6)
MAGNESIUM SERPL-MCNC: 2 MG/DL (ref 1.6–2.6)
MAGNESIUM SERPL-MCNC: 2.1 MG/DL (ref 1.6–2.6)
MAGNESIUM SERPL-MCNC: 2.2 MG/DL (ref 1.6–2.6)
MAGNESIUM SERPL-MCNC: 2.2 MG/DL (ref 1.6–2.6)
MAGNESIUM SERPL-MCNC: 2.3 MG/DL
MAGNESIUM SERPL-MCNC: 2.3 MG/DL
MAGNESIUM SERPL-MCNC: 2.3 MG/DL (ref 1.6–2.6)
MAGNESIUM SERPL-MCNC: 2.3 MG/DL (ref 1.6–2.6)
MAGNESIUM SERPL-MCNC: 2.4 MG/DL (ref 1.6–2.6)
MAGNESIUM SERPL-MCNC: 2.5 MG/DL (ref 1.6–2.6)
MCH RBC QN AUTO: 25.2 PG (ref 27–31)
MCH RBC QN AUTO: 25.2 PG (ref 27–31)
MCH RBC QN AUTO: 25.3 PG
MCH RBC QN AUTO: 25.3 PG
MCH RBC QN AUTO: 25.4 PG (ref 27–31)
MCH RBC QN AUTO: 25.5 PG (ref 27–31)
MCH RBC QN AUTO: 25.6 PG
MCH RBC QN AUTO: 25.6 PG (ref 27–31)
MCH RBC QN AUTO: 25.6 PG (ref 27–31)
MCH RBC QN AUTO: 25.7 PG
MCH RBC QN AUTO: 25.7 PG (ref 27–31)
MCH RBC QN AUTO: 25.7 PG (ref 27–31)
MCH RBC QN AUTO: 25.8 PG (ref 27–31)
MCH RBC QN AUTO: 25.8 PG (ref 27–31)
MCH RBC QN AUTO: 25.9 PG (ref 27–31)
MCH RBC QN AUTO: 26.1 PG (ref 27–31)
MCH RBC QN AUTO: 26.3 PG (ref 27–31)
MCH RBC QN AUTO: 26.4 PG (ref 27–31)
MCH RBC QN AUTO: 26.5 PG (ref 27–31)
MCH RBC QN AUTO: 26.6 PG (ref 27–31)
MCH RBC QN AUTO: 26.6 PG (ref 27–31)
MCH RBC QN AUTO: 26.7 PG (ref 27–31)
MCH RBC QN AUTO: 26.8 PG (ref 27–31)
MCH RBC QN AUTO: 26.9 PG (ref 27–31)
MCH RBC QN AUTO: 27 PG (ref 27–31)
MCH RBC QN AUTO: 27 PG (ref 27–31)
MCH RBC QN AUTO: 27.1 PG (ref 27–31)
MCH RBC QN AUTO: 27.2 PG (ref 27–31)
MCH RBC QN AUTO: 27.3 PG (ref 27–31)
MCH RBC QN AUTO: 27.4 PG (ref 27–31)
MCH RBC QN AUTO: 27.5 PG (ref 27–31)
MCHC RBC AUTO-ENTMCNC: 30.7 G/DL (ref 32–36)
MCHC RBC AUTO-ENTMCNC: 30.9 G/DL (ref 32–36)
MCHC RBC AUTO-ENTMCNC: 31.3 G/DL (ref 32–36)
MCHC RBC AUTO-ENTMCNC: 31.3 G/DL (ref 32–36)
MCHC RBC AUTO-ENTMCNC: 31.4 G/DL (ref 32–36)
MCHC RBC AUTO-ENTMCNC: 31.5 G/DL
MCHC RBC AUTO-ENTMCNC: 31.5 G/DL (ref 32–36)
MCHC RBC AUTO-ENTMCNC: 31.6 G/DL (ref 32–36)
MCHC RBC AUTO-ENTMCNC: 31.7 G/DL (ref 32–36)
MCHC RBC AUTO-ENTMCNC: 31.8 G/DL (ref 32–36)
MCHC RBC AUTO-ENTMCNC: 32 G/DL
MCHC RBC AUTO-ENTMCNC: 32 G/DL (ref 32–36)
MCHC RBC AUTO-ENTMCNC: 32.1 G/DL (ref 32–36)
MCHC RBC AUTO-ENTMCNC: 32.2 G/DL (ref 32–36)
MCHC RBC AUTO-ENTMCNC: 32.2 G/DL (ref 32–36)
MCHC RBC AUTO-ENTMCNC: 32.3 G/DL (ref 32–36)
MCHC RBC AUTO-ENTMCNC: 32.4 G/DL
MCHC RBC AUTO-ENTMCNC: 32.4 G/DL (ref 32–36)
MCHC RBC AUTO-ENTMCNC: 32.5 G/DL (ref 32–36)
MCHC RBC AUTO-ENTMCNC: 32.6 G/DL (ref 32–36)
MCHC RBC AUTO-ENTMCNC: 32.6 G/DL (ref 32–36)
MCHC RBC AUTO-ENTMCNC: 32.7 G/DL
MCHC RBC AUTO-ENTMCNC: 32.7 G/DL (ref 32–36)
MCHC RBC AUTO-ENTMCNC: 32.8 G/DL (ref 32–36)
MCHC RBC AUTO-ENTMCNC: 32.9 G/DL (ref 32–36)
MCHC RBC AUTO-ENTMCNC: 33 G/DL (ref 32–36)
MCHC RBC AUTO-ENTMCNC: 33.1 G/DL (ref 32–36)
MCHC RBC AUTO-ENTMCNC: 33.2 G/DL (ref 32–36)
MCHC RBC AUTO-ENTMCNC: 33.3 G/DL (ref 32–36)
MCHC RBC AUTO-ENTMCNC: 33.3 G/DL (ref 32–36)
MCHC RBC AUTO-ENTMCNC: 33.4 G/DL (ref 32–36)
MCHC RBC AUTO-ENTMCNC: 33.4 G/DL (ref 32–36)
MCHC RBC AUTO-ENTMCNC: 33.6 G/DL (ref 32–36)
MCV RBC AUTO: 78 FL
MCV RBC AUTO: 78 FL (ref 82–98)
MCV RBC AUTO: 78 FL (ref 82–98)
MCV RBC AUTO: 79 FL
MCV RBC AUTO: 79 FL (ref 82–98)
MCV RBC AUTO: 80 FL
MCV RBC AUTO: 80 FL
MCV RBC AUTO: 80 FL (ref 82–98)
MCV RBC AUTO: 81 FL (ref 82–98)
MCV RBC AUTO: 82 FL (ref 82–98)
MCV RBC AUTO: 83 FL (ref 82–98)
MCV RBC AUTO: 84 FL (ref 82–98)
MCV RBC AUTO: 85 FL (ref 82–98)
MCV RBC AUTO: 86 FL (ref 82–98)
MCV RBC AUTO: 87 FL (ref 82–98)
MODE: ABNORMAL
MONOCYTES # BLD AUTO: 0.1 K/UL (ref 0.3–1)
MONOCYTES # BLD AUTO: 0.2 K/UL
MONOCYTES # BLD AUTO: 0.2 K/UL
MONOCYTES # BLD AUTO: 0.2 K/UL (ref 0.3–1)
MONOCYTES # BLD AUTO: 0.3 K/UL
MONOCYTES # BLD AUTO: 0.3 K/UL
MONOCYTES # BLD AUTO: 0.3 K/UL (ref 0.3–1)
MONOCYTES # BLD AUTO: 0.4 K/UL (ref 0.3–1)
MONOCYTES # BLD AUTO: 0.5 K/UL (ref 0.3–1)
MONOCYTES # BLD AUTO: 0.5 K/UL (ref 0.3–1)
MONOCYTES # BLD AUTO: 0.7 K/UL (ref 0.3–1)
MONOCYTES NFR BLD: 1.3 % (ref 4–15)
MONOCYTES NFR BLD: 1.4 % (ref 4–15)
MONOCYTES NFR BLD: 1.6 % (ref 4–15)
MONOCYTES NFR BLD: 1.7 % (ref 4–15)
MONOCYTES NFR BLD: 1.9 % (ref 4–15)
MONOCYTES NFR BLD: 10.3 % (ref 4–15)
MONOCYTES NFR BLD: 11.7 % (ref 4–15)
MONOCYTES NFR BLD: 12.1 % (ref 4–15)
MONOCYTES NFR BLD: 13.1 % (ref 4–15)
MONOCYTES NFR BLD: 14 % (ref 4–15)
MONOCYTES NFR BLD: 2.3 % (ref 4–15)
MONOCYTES NFR BLD: 2.3 % (ref 4–15)
MONOCYTES NFR BLD: 2.4 % (ref 4–15)
MONOCYTES NFR BLD: 2.6 % (ref 4–15)
MONOCYTES NFR BLD: 2.6 % (ref 4–15)
MONOCYTES NFR BLD: 2.8 % (ref 4–15)
MONOCYTES NFR BLD: 2.8 % (ref 4–15)
MONOCYTES NFR BLD: 3 % (ref 4–15)
MONOCYTES NFR BLD: 3.1 % (ref 4–15)
MONOCYTES NFR BLD: 3.2 % (ref 4–15)
MONOCYTES NFR BLD: 3.5 % (ref 4–15)
MONOCYTES NFR BLD: 3.6 % (ref 4–15)
MONOCYTES NFR BLD: 3.6 % (ref 4–15)
MONOCYTES NFR BLD: 3.8 % (ref 4–15)
MONOCYTES NFR BLD: 4.2 % (ref 4–15)
MONOCYTES NFR BLD: 4.4 % (ref 4–15)
MONOCYTES NFR BLD: 4.7 % (ref 4–15)
MONOCYTES NFR BLD: 4.7 % (ref 4–15)
MONOCYTES NFR BLD: 4.9 %
MONOCYTES NFR BLD: 5.4 % (ref 4–15)
MONOCYTES NFR BLD: 5.5 % (ref 4–15)
MONOCYTES NFR BLD: 5.6 % (ref 4–15)
MONOCYTES NFR BLD: 5.7 % (ref 4–15)
MONOCYTES NFR BLD: 6 % (ref 4–15)
MONOCYTES NFR BLD: 6.4 %
MONOCYTES NFR BLD: 6.4 % (ref 4–15)
MONOCYTES NFR BLD: 6.9 % (ref 4–15)
MONOCYTES NFR BLD: 6.9 % (ref 4–15)
MONOCYTES NFR BLD: 7 % (ref 4–15)
MONOCYTES NFR BLD: 7.1 % (ref 4–15)
MONOCYTES NFR BLD: 7.3 % (ref 4–15)
MONOCYTES NFR BLD: 7.3 % (ref 4–15)
MONOCYTES NFR BLD: 7.5 % (ref 4–15)
MONOCYTES NFR BLD: 7.7 % (ref 4–15)
MONOCYTES NFR BLD: 7.9 %
MONOCYTES NFR BLD: 7.9 % (ref 4–15)
MONOCYTES NFR BLD: 8 % (ref 4–15)
MONOCYTES NFR BLD: 8.2 %
MONOCYTES NFR BLD: 8.2 % (ref 4–15)
MONOCYTES NFR BLD: 8.3 % (ref 4–15)
MONOCYTES NFR BLD: 8.4 % (ref 4–15)
MONOCYTES NFR BLD: 8.5 % (ref 4–15)
MONOCYTES NFR BLD: 8.6 % (ref 4–15)
MONOCYTES NFR BLD: 8.7 % (ref 4–15)
MONOCYTES NFR BLD: 8.7 % (ref 4–15)
MONOCYTES NFR BLD: 9.1 % (ref 4–15)
MONOCYTES NFR BLD: 9.3 % (ref 4–15)
MONOCYTES NFR BLD: 9.4 % (ref 4–15)
MONOCYTES NFR BLD: 9.7 % (ref 4–15)
MONOCYTES NFR BLD: 9.8 % (ref 4–15)
NEUTROPHILS # BLD AUTO: 1.4 K/UL (ref 1.8–7.7)
NEUTROPHILS # BLD AUTO: 1.4 K/UL (ref 1.8–7.7)
NEUTROPHILS # BLD AUTO: 1.6 K/UL (ref 1.8–7.7)
NEUTROPHILS # BLD AUTO: 1.6 K/UL (ref 1.8–7.7)
NEUTROPHILS # BLD AUTO: 1.7 K/UL (ref 1.8–7.7)
NEUTROPHILS # BLD AUTO: 1.8 K/UL
NEUTROPHILS # BLD AUTO: 1.8 K/UL
NEUTROPHILS # BLD AUTO: 1.8 K/UL (ref 1.8–7.7)
NEUTROPHILS # BLD AUTO: 1.8 K/UL (ref 1.8–7.7)
NEUTROPHILS # BLD AUTO: 1.9 K/UL (ref 1.8–7.7)
NEUTROPHILS # BLD AUTO: 2 K/UL (ref 1.8–7.7)
NEUTROPHILS # BLD AUTO: 2.1 K/UL (ref 1.8–7.7)
NEUTROPHILS # BLD AUTO: 2.3 K/UL (ref 1.8–7.7)
NEUTROPHILS # BLD AUTO: 2.4 K/UL (ref 1.8–7.7)
NEUTROPHILS # BLD AUTO: 2.4 K/UL (ref 1.8–7.7)
NEUTROPHILS # BLD AUTO: 2.5 K/UL (ref 1.8–7.7)
NEUTROPHILS # BLD AUTO: 2.7 K/UL
NEUTROPHILS # BLD AUTO: 2.7 K/UL (ref 1.8–7.7)
NEUTROPHILS # BLD AUTO: 2.8 K/UL
NEUTROPHILS # BLD AUTO: 2.8 K/UL (ref 1.8–7.7)
NEUTROPHILS # BLD AUTO: 2.8 K/UL (ref 1.8–7.7)
NEUTROPHILS # BLD AUTO: 2.9 K/UL (ref 1.8–7.7)
NEUTROPHILS # BLD AUTO: 2.9 K/UL (ref 1.8–7.7)
NEUTROPHILS # BLD AUTO: 3 K/UL (ref 1.8–7.7)
NEUTROPHILS # BLD AUTO: 3 K/UL (ref 1.8–7.7)
NEUTROPHILS # BLD AUTO: 3.1 K/UL (ref 1.8–7.7)
NEUTROPHILS # BLD AUTO: 4 K/UL (ref 1.8–7.7)
NEUTROPHILS # BLD AUTO: 4.1 K/UL (ref 1.8–7.7)
NEUTROPHILS # BLD AUTO: 4.1 K/UL (ref 1.8–7.7)
NEUTROPHILS # BLD AUTO: 4.2 K/UL (ref 1.8–7.7)
NEUTROPHILS # BLD AUTO: 4.3 K/UL (ref 1.8–7.7)
NEUTROPHILS # BLD AUTO: 4.5 K/UL (ref 1.8–7.7)
NEUTROPHILS # BLD AUTO: 4.6 K/UL (ref 1.8–7.7)
NEUTROPHILS # BLD AUTO: 4.7 K/UL (ref 1.8–7.7)
NEUTROPHILS # BLD AUTO: 4.8 K/UL (ref 1.8–7.7)
NEUTROPHILS # BLD AUTO: 5 K/UL (ref 1.8–7.7)
NEUTROPHILS # BLD AUTO: 5.3 K/UL (ref 1.8–7.7)
NEUTROPHILS # BLD AUTO: 6.6 K/UL (ref 1.8–7.7)
NEUTROPHILS # BLD AUTO: 6.8 K/UL (ref 1.8–7.7)
NEUTROPHILS # BLD AUTO: 6.9 K/UL (ref 1.8–7.7)
NEUTROPHILS # BLD AUTO: 6.9 K/UL (ref 1.8–7.7)
NEUTROPHILS # BLD AUTO: 7.5 K/UL (ref 1.8–7.7)
NEUTROPHILS # BLD AUTO: 7.6 K/UL (ref 1.8–7.7)
NEUTROPHILS # BLD AUTO: 7.6 K/UL (ref 1.8–7.7)
NEUTROPHILS # BLD AUTO: 7.9 K/UL (ref 1.8–7.7)
NEUTROPHILS # BLD AUTO: 7.9 K/UL (ref 1.8–7.7)
NEUTROPHILS # BLD AUTO: 9.9 K/UL (ref 1.8–7.7)
NEUTROPHILS NFR BLD: 53.2 % (ref 38–73)
NEUTROPHILS NFR BLD: 55 % (ref 38–73)
NEUTROPHILS NFR BLD: 55.5 % (ref 38–73)
NEUTROPHILS NFR BLD: 55.8 % (ref 38–73)
NEUTROPHILS NFR BLD: 56.7 % (ref 38–73)
NEUTROPHILS NFR BLD: 57.7 % (ref 38–73)
NEUTROPHILS NFR BLD: 58.2 % (ref 38–73)
NEUTROPHILS NFR BLD: 58.6 %
NEUTROPHILS NFR BLD: 58.8 % (ref 38–73)
NEUTROPHILS NFR BLD: 62.3 % (ref 38–73)
NEUTROPHILS NFR BLD: 62.6 %
NEUTROPHILS NFR BLD: 62.9 % (ref 38–73)
NEUTROPHILS NFR BLD: 63.4 % (ref 38–73)
NEUTROPHILS NFR BLD: 64.2 %
NEUTROPHILS NFR BLD: 64.3 % (ref 38–73)
NEUTROPHILS NFR BLD: 64.8 % (ref 38–73)
NEUTROPHILS NFR BLD: 65 % (ref 38–73)
NEUTROPHILS NFR BLD: 65.4 % (ref 38–73)
NEUTROPHILS NFR BLD: 65.9 % (ref 38–73)
NEUTROPHILS NFR BLD: 66.8 % (ref 38–73)
NEUTROPHILS NFR BLD: 66.9 % (ref 38–73)
NEUTROPHILS NFR BLD: 67.4 % (ref 38–73)
NEUTROPHILS NFR BLD: 67.6 % (ref 38–73)
NEUTROPHILS NFR BLD: 67.9 % (ref 38–73)
NEUTROPHILS NFR BLD: 68.2 % (ref 38–73)
NEUTROPHILS NFR BLD: 68.8 % (ref 38–73)
NEUTROPHILS NFR BLD: 69 % (ref 38–73)
NEUTROPHILS NFR BLD: 69.1 % (ref 38–73)
NEUTROPHILS NFR BLD: 69.3 % (ref 38–73)
NEUTROPHILS NFR BLD: 70 % (ref 38–73)
NEUTROPHILS NFR BLD: 70.2 % (ref 38–73)
NEUTROPHILS NFR BLD: 70.4 % (ref 38–73)
NEUTROPHILS NFR BLD: 70.4 % (ref 38–73)
NEUTROPHILS NFR BLD: 71.1 % (ref 38–73)
NEUTROPHILS NFR BLD: 71.6 % (ref 38–73)
NEUTROPHILS NFR BLD: 71.8 % (ref 38–73)
NEUTROPHILS NFR BLD: 72.1 % (ref 38–73)
NEUTROPHILS NFR BLD: 72.9 % (ref 38–73)
NEUTROPHILS NFR BLD: 73.2 % (ref 38–73)
NEUTROPHILS NFR BLD: 73.3 % (ref 38–73)
NEUTROPHILS NFR BLD: 73.5 % (ref 38–73)
NEUTROPHILS NFR BLD: 74.3 %
NEUTROPHILS NFR BLD: 74.6 % (ref 38–73)
NEUTROPHILS NFR BLD: 75 % (ref 38–73)
NEUTROPHILS NFR BLD: 76.5 % (ref 38–73)
NEUTROPHILS NFR BLD: 77.8 % (ref 38–73)
NEUTROPHILS NFR BLD: 83.5 % (ref 38–73)
NEUTROPHILS NFR BLD: 84 % (ref 38–73)
NEUTROPHILS NFR BLD: 85.9 % (ref 38–73)
NEUTROPHILS NFR BLD: 86.7 % (ref 38–73)
NEUTROPHILS NFR BLD: 86.8 % (ref 38–73)
NEUTROPHILS NFR BLD: 87.1 % (ref 38–73)
NEUTROPHILS NFR BLD: 87.2 % (ref 38–73)
NEUTROPHILS NFR BLD: 87.7 % (ref 38–73)
NEUTROPHILS NFR BLD: 87.8 % (ref 38–73)
NEUTROPHILS NFR BLD: 88 % (ref 38–73)
NEUTROPHILS NFR BLD: 88.3 % (ref 38–73)
NEUTROPHILS NFR BLD: 88.5 % (ref 38–73)
NEUTROPHILS NFR BLD: 89.2 % (ref 38–73)
NEUTROPHILS NFR BLD: 89.6 % (ref 38–73)
NEUTROPHILS NFR BLD: 90.2 % (ref 38–73)
NEUTROPHILS NFR BLD: 90.4 % (ref 38–73)
NEUTROPHILS NFR BLD: 91.1 % (ref 38–73)
NEUTROPHILS NFR BLD: 91.3 % (ref 38–73)
NEUTROPHILS NFR BLD: 91.3 % (ref 38–73)
NEUTROPHILS NFR BLD: 92.8 % (ref 38–73)
NRBC BLD-RTO: 0 /100 WBC
NRBC BLD-RTO: 1 /100 WBC
NUM UNITS TRANS PACKED RBC: NORMAL
NUM UNITS TRANS PACKED RBC: NORMAL
OVALOCYTES BLD QL SMEAR: ABNORMAL
PARAINFLUENZA: NOT DETECTED
PCO2 BLDA: 30.9 MMHG (ref 35–45)
PCO2 BLDA: 31.7 MMHG (ref 35–45)
PCO2 BLDA: 31.8 MMHG (ref 35–45)
PCO2 BLDA: 35.4 MMHG (ref 35–45)
PCO2 BLDA: 36.2 MMHG (ref 35–45)
PCO2 BLDA: 39.1 MMHG (ref 35–45)
PCO2 BLDA: 39.8 MMHG (ref 35–45)
PEEP: 5
PEEP: 5
PH SMN: 7.34 [PH] (ref 7.35–7.45)
PH SMN: 7.38 [PH] (ref 7.35–7.45)
PH SMN: 7.39 [PH] (ref 7.35–7.45)
PH SMN: 7.4 [PH] (ref 7.35–7.45)
PH SMN: 7.4 [PH] (ref 7.35–7.45)
PH SMN: 7.41 [PH] (ref 7.35–7.45)
PH SMN: 7.42 [PH] (ref 7.35–7.45)
PHOSPHATE SERPL-MCNC: 1.4 MG/DL (ref 2.7–4.5)
PHOSPHATE SERPL-MCNC: 1.5 MG/DL (ref 2.7–4.5)
PHOSPHATE SERPL-MCNC: 1.8 MG/DL (ref 2.7–4.5)
PHOSPHATE SERPL-MCNC: 1.9 MG/DL (ref 2.7–4.5)
PHOSPHATE SERPL-MCNC: 2 MG/DL (ref 2.7–4.5)
PHOSPHATE SERPL-MCNC: 2.3 MG/DL (ref 2.7–4.5)
PHOSPHATE SERPL-MCNC: 2.4 MG/DL (ref 2.7–4.5)
PHOSPHATE SERPL-MCNC: 2.4 MG/DL (ref 2.7–4.5)
PHOSPHATE SERPL-MCNC: 2.6 MG/DL (ref 2.7–4.5)
PHOSPHATE SERPL-MCNC: 2.7 MG/DL (ref 2.7–4.5)
PHOSPHATE SERPL-MCNC: 2.9 MG/DL (ref 2.7–4.5)
PHOSPHATE SERPL-MCNC: 2.9 MG/DL (ref 2.7–4.5)
PHOSPHATE SERPL-MCNC: 3 MG/DL (ref 2.7–4.5)
PHOSPHATE SERPL-MCNC: 3.4 MG/DL (ref 2.7–4.5)
PHOSPHATE SERPL-MCNC: 3.5 MG/DL (ref 2.7–4.5)
PHOSPHATE SERPL-MCNC: 3.8 MG/DL (ref 2.7–4.5)
PHOSPHATE SERPL-MCNC: 4.2 MG/DL (ref 2.7–4.5)
PHOSPHATE SERPL-MCNC: 4.6 MG/DL (ref 2.7–4.5)
PHOSPHATE SERPL-MCNC: 5 MG/DL (ref 2.7–4.5)
PHOSPHATE SERPL-MCNC: 5.2 MG/DL
PHOSPHATE SERPL-MCNC: 5.3 MG/DL (ref 2.7–4.5)
PHOSPHATE SERPL-MCNC: 5.4 MG/DL (ref 2.7–4.5)
PHOSPHATE SERPL-MCNC: 5.9 MG/DL (ref 2.7–4.5)
PHOSPHATE SERPL-MCNC: 6.1 MG/DL
PHOSPHATE SERPL-MCNC: <1 MG/DL (ref 2.7–4.5)
PHOSPHATE SERPL-MCNC: <1 MG/DL (ref 2.7–4.5)
PLATELET # BLD AUTO: 104 K/UL (ref 150–350)
PLATELET # BLD AUTO: 105 K/UL (ref 150–350)
PLATELET # BLD AUTO: 113 K/UL (ref 150–350)
PLATELET # BLD AUTO: 115 K/UL (ref 150–350)
PLATELET # BLD AUTO: 118 K/UL (ref 150–350)
PLATELET # BLD AUTO: 123 K/UL (ref 150–350)
PLATELET # BLD AUTO: 124 K/UL (ref 150–350)
PLATELET # BLD AUTO: 124 K/UL (ref 150–350)
PLATELET # BLD AUTO: 129 K/UL (ref 150–350)
PLATELET # BLD AUTO: 131 K/UL (ref 150–350)
PLATELET # BLD AUTO: 133 K/UL (ref 150–350)
PLATELET # BLD AUTO: 145 K/UL (ref 150–350)
PLATELET # BLD AUTO: 151 K/UL (ref 150–350)
PLATELET # BLD AUTO: 154 K/UL (ref 150–350)
PLATELET # BLD AUTO: 155 K/UL (ref 150–350)
PLATELET # BLD AUTO: 168 K/UL (ref 150–350)
PLATELET # BLD AUTO: 185 K/UL (ref 150–350)
PLATELET # BLD AUTO: 189 K/UL (ref 150–350)
PLATELET # BLD AUTO: 34 K/UL (ref 150–350)
PLATELET # BLD AUTO: 34 K/UL (ref 150–350)
PLATELET # BLD AUTO: 35 K/UL (ref 150–350)
PLATELET # BLD AUTO: 40 K/UL (ref 150–350)
PLATELET # BLD AUTO: 42 K/UL (ref 150–350)
PLATELET # BLD AUTO: 43 K/UL (ref 150–350)
PLATELET # BLD AUTO: 45 K/UL (ref 150–350)
PLATELET # BLD AUTO: 47 K/UL (ref 150–350)
PLATELET # BLD AUTO: 49 K/UL (ref 150–350)
PLATELET # BLD AUTO: 50 K/UL (ref 150–350)
PLATELET # BLD AUTO: 50 K/UL (ref 150–350)
PLATELET # BLD AUTO: 52 K/UL (ref 150–350)
PLATELET # BLD AUTO: 54 K/UL
PLATELET # BLD AUTO: 54 K/UL
PLATELET # BLD AUTO: 55 K/UL
PLATELET # BLD AUTO: 55 K/UL (ref 150–350)
PLATELET # BLD AUTO: 56 K/UL (ref 150–350)
PLATELET # BLD AUTO: 57 K/UL (ref 150–350)
PLATELET # BLD AUTO: 58 K/UL (ref 150–350)
PLATELET # BLD AUTO: 61 K/UL
PLATELET # BLD AUTO: 62 K/UL (ref 150–350)
PLATELET # BLD AUTO: 63 K/UL (ref 150–350)
PLATELET # BLD AUTO: 65 K/UL (ref 150–350)
PLATELET # BLD AUTO: 71 K/UL (ref 150–350)
PLATELET # BLD AUTO: 71 K/UL (ref 150–350)
PLATELET # BLD AUTO: 72 K/UL (ref 150–350)
PLATELET # BLD AUTO: 75 K/UL (ref 150–350)
PLATELET # BLD AUTO: 76 K/UL (ref 150–350)
PLATELET # BLD AUTO: 77 K/UL (ref 150–350)
PLATELET # BLD AUTO: 78 K/UL (ref 150–350)
PLATELET # BLD AUTO: 78 K/UL (ref 150–350)
PLATELET # BLD AUTO: 79 K/UL (ref 150–350)
PLATELET # BLD AUTO: 82 K/UL (ref 150–350)
PLATELET # BLD AUTO: 84 K/UL (ref 150–350)
PLATELET # BLD AUTO: 87 K/UL (ref 150–350)
PLATELET # BLD AUTO: 88 K/UL (ref 150–350)
PLATELET # BLD AUTO: 89 K/UL (ref 150–350)
PLATELET # BLD AUTO: 91 K/UL (ref 150–350)
PLATELET # BLD AUTO: 91 K/UL (ref 150–350)
PLATELET # BLD AUTO: 92 K/UL (ref 150–350)
PLATELET # BLD AUTO: 93 K/UL (ref 150–350)
PLATELET # BLD AUTO: 95 K/UL (ref 150–350)
PLATELET # BLD AUTO: 96 K/UL (ref 150–350)
PLATELET BLD QL SMEAR: ABNORMAL
PMV BLD AUTO: 10 FL (ref 9.2–12.9)
PMV BLD AUTO: 10.1 FL (ref 9.2–12.9)
PMV BLD AUTO: 10.2 FL (ref 9.2–12.9)
PMV BLD AUTO: 10.3 FL (ref 9.2–12.9)
PMV BLD AUTO: 10.3 FL (ref 9.2–12.9)
PMV BLD AUTO: 10.4 FL (ref 9.2–12.9)
PMV BLD AUTO: 10.4 FL (ref 9.2–12.9)
PMV BLD AUTO: 10.6 FL (ref 9.2–12.9)
PMV BLD AUTO: 10.7 FL (ref 9.2–12.9)
PMV BLD AUTO: 10.7 FL (ref 9.2–12.9)
PMV BLD AUTO: 10.8 FL (ref 9.2–12.9)
PMV BLD AUTO: 11 FL (ref 9.2–12.9)
PMV BLD AUTO: 11.1 FL (ref 9.2–12.9)
PMV BLD AUTO: 11.2 FL (ref 9.2–12.9)
PMV BLD AUTO: 11.2 FL (ref 9.2–12.9)
PMV BLD AUTO: 11.8 FL (ref 9.2–12.9)
PMV BLD AUTO: 8.7 FL (ref 9.2–12.9)
PMV BLD AUTO: 9 FL (ref 9.2–12.9)
PMV BLD AUTO: 9.2 FL (ref 9.2–12.9)
PMV BLD AUTO: 9.3 FL (ref 9.2–12.9)
PMV BLD AUTO: 9.3 FL (ref 9.2–12.9)
PMV BLD AUTO: 9.4 FL (ref 9.2–12.9)
PMV BLD AUTO: 9.5 FL (ref 9.2–12.9)
PMV BLD AUTO: 9.6 FL (ref 9.2–12.9)
PMV BLD AUTO: 9.7 FL (ref 9.2–12.9)
PMV BLD AUTO: 9.8 FL (ref 9.2–12.9)
PMV BLD AUTO: 9.8 FL (ref 9.2–12.9)
PMV BLD AUTO: 9.9 FL (ref 9.2–12.9)
PMV BLD AUTO: 9.9 FL (ref 9.2–12.9)
PMV BLD AUTO: ABNORMAL FL
PMV BLD AUTO: ABNORMAL FL (ref 9.2–12.9)
PO2 BLDA: 172 MMHG (ref 80–100)
PO2 BLDA: 202 MMHG (ref 80–100)
PO2 BLDA: 263 MMHG (ref 80–100)
PO2 BLDA: 81 MMHG (ref 80–100)
PO2 BLDA: 88 MMHG (ref 80–100)
PO2 BLDA: 93 MMHG (ref 80–100)
PO2 BLDA: 94 MMHG (ref 80–100)
POC BE: -2 MMOL/L
POC BE: -4 MMOL/L
POC BE: -5 MMOL/L
POC BE: -7 MMOL/L
POC BE: 1 MMOL/L
POC IONIZED CALCIUM: 0.61 MMOL/L (ref 1.06–1.42)
POC IONIZED CALCIUM: 0.64 MMOL/L (ref 1.06–1.42)
POC IONIZED CALCIUM: 0.67 MMOL/L (ref 1.06–1.42)
POC IONIZED CALCIUM: 0.67 MMOL/L (ref 1.06–1.42)
POC IONIZED CALCIUM: 0.71 MMOL/L (ref 1.06–1.42)
POC IONIZED CALCIUM: 1.02 MMOL/L (ref 1.06–1.42)
POC SATURATED O2: 100 % (ref 95–100)
POC SATURATED O2: 96 % (ref 95–100)
POC SATURATED O2: 97 % (ref 95–100)
POC TCO2 (MEASURED): 22 MMOL/L (ref 23–29)
POC TCO2 (MEASURED): 24 MMOL/L (ref 23–29)
POC TCO2: 19 MMOL/L (ref 23–27)
POC TCO2: 21 MMOL/L (ref 23–27)
POC TCO2: 21 MMOL/L (ref 23–27)
POC TCO2: 22 MMOL/L (ref 23–27)
POC TCO2: 22 MMOL/L (ref 23–27)
POC TCO2: 24 MMOL/L (ref 23–27)
POC TCO2: 27 MMOL/L (ref 23–27)
POCT GLUCOSE: 104 MG/DL (ref 70–110)
POCT GLUCOSE: 115 MG/DL (ref 70–110)
POCT GLUCOSE: 116 MG/DL (ref 70–110)
POCT GLUCOSE: 117 MG/DL (ref 70–110)
POCT GLUCOSE: 118 MG/DL (ref 70–110)
POCT GLUCOSE: 124 MG/DL (ref 70–110)
POCT GLUCOSE: 125 MG/DL (ref 70–110)
POCT GLUCOSE: 125 MG/DL (ref 70–110)
POCT GLUCOSE: 126 MG/DL (ref 70–110)
POCT GLUCOSE: 128 MG/DL (ref 70–110)
POCT GLUCOSE: 129 MG/DL (ref 70–110)
POCT GLUCOSE: 129 MG/DL (ref 70–110)
POCT GLUCOSE: 130 MG/DL (ref 70–110)
POCT GLUCOSE: 132 MG/DL (ref 70–110)
POCT GLUCOSE: 136 MG/DL (ref 70–110)
POCT GLUCOSE: 137 MG/DL (ref 70–110)
POCT GLUCOSE: 137 MG/DL (ref 70–110)
POCT GLUCOSE: 138 MG/DL (ref 70–110)
POCT GLUCOSE: 138 MG/DL (ref 70–110)
POCT GLUCOSE: 140 MG/DL (ref 70–110)
POCT GLUCOSE: 144 MG/DL (ref 70–110)
POCT GLUCOSE: 144 MG/DL (ref 70–110)
POCT GLUCOSE: 146 MG/DL (ref 70–110)
POCT GLUCOSE: 148 MG/DL (ref 70–110)
POCT GLUCOSE: 149 MG/DL (ref 70–110)
POCT GLUCOSE: 149 MG/DL (ref 70–110)
POCT GLUCOSE: 150 MG/DL (ref 70–110)
POCT GLUCOSE: 154 MG/DL (ref 70–110)
POCT GLUCOSE: 156 MG/DL (ref 70–110)
POCT GLUCOSE: 156 MG/DL (ref 70–110)
POCT GLUCOSE: 158 MG/DL (ref 70–110)
POCT GLUCOSE: 158 MG/DL (ref 70–110)
POCT GLUCOSE: 159 MG/DL (ref 70–110)
POCT GLUCOSE: 160 MG/DL (ref 70–110)
POCT GLUCOSE: 161 MG/DL (ref 70–110)
POCT GLUCOSE: 162 MG/DL (ref 70–110)
POCT GLUCOSE: 165 MG/DL (ref 70–110)
POCT GLUCOSE: 166 MG/DL (ref 70–110)
POCT GLUCOSE: 168 MG/DL (ref 70–110)
POCT GLUCOSE: 169 MG/DL (ref 70–110)
POCT GLUCOSE: 172 MG/DL (ref 70–110)
POCT GLUCOSE: 176 MG/DL (ref 70–110)
POCT GLUCOSE: 176 MG/DL (ref 70–110)
POCT GLUCOSE: 179 MG/DL (ref 70–110)
POCT GLUCOSE: 181 MG/DL (ref 70–110)
POCT GLUCOSE: 186 MG/DL (ref 70–110)
POCT GLUCOSE: 188 MG/DL (ref 70–110)
POCT GLUCOSE: 189 MG/DL (ref 70–110)
POCT GLUCOSE: 191 MG/DL (ref 70–110)
POCT GLUCOSE: 192 MG/DL (ref 70–110)
POCT GLUCOSE: 195 MG/DL (ref 70–110)
POCT GLUCOSE: 200 MG/DL (ref 70–110)
POCT GLUCOSE: 211 MG/DL (ref 70–110)
POCT GLUCOSE: 238 MG/DL (ref 70–110)
POCT GLUCOSE: 241 MG/DL (ref 70–110)
POCT GLUCOSE: 260 MG/DL (ref 70–110)
POCT GLUCOSE: 93 MG/DL (ref 70–110)
POIKILOCYTOSIS BLD QL SMEAR: SLIGHT
POLYCHROMASIA BLD QL SMEAR: ABNORMAL
POOLED CRYOPPT GVN BPU: NORMAL
POTASSIUM BLD-SCNC: 4.3 MMOL/L (ref 3.5–5.1)
POTASSIUM BLD-SCNC: 4.4 MMOL/L (ref 3.5–5.1)
POTASSIUM BLD-SCNC: 4.5 MMOL/L (ref 3.5–5.1)
POTASSIUM BLD-SCNC: 5.1 MMOL/L (ref 3.5–5.1)
POTASSIUM BLD-SCNC: 5.3 MMOL/L (ref 3.5–5.1)
POTASSIUM BLD-SCNC: 5.3 MMOL/L (ref 3.5–5.1)
POTASSIUM SERPL-SCNC: 3.5 MMOL/L (ref 3.5–5.1)
POTASSIUM SERPL-SCNC: 3.5 MMOL/L (ref 3.5–5.1)
POTASSIUM SERPL-SCNC: 3.6 MMOL/L (ref 3.5–5.1)
POTASSIUM SERPL-SCNC: 3.7 MMOL/L
POTASSIUM SERPL-SCNC: 3.8 MMOL/L (ref 3.5–5.1)
POTASSIUM SERPL-SCNC: 3.9 MMOL/L (ref 3.5–5.1)
POTASSIUM SERPL-SCNC: 4 MMOL/L (ref 3.5–5.1)
POTASSIUM SERPL-SCNC: 4.1 MMOL/L
POTASSIUM SERPL-SCNC: 4.1 MMOL/L (ref 3.5–5.1)
POTASSIUM SERPL-SCNC: 4.2 MMOL/L (ref 3.5–5.1)
POTASSIUM SERPL-SCNC: 4.3 MMOL/L (ref 3.5–5.1)
POTASSIUM SERPL-SCNC: 4.4 MMOL/L (ref 3.5–5.1)
POTASSIUM SERPL-SCNC: 4.5 MMOL/L (ref 3.5–5.1)
POTASSIUM SERPL-SCNC: 4.6 MMOL/L (ref 3.5–5.1)
POTASSIUM SERPL-SCNC: 4.7 MMOL/L
POTASSIUM SERPL-SCNC: 4.7 MMOL/L (ref 3.5–5.1)
POTASSIUM SERPL-SCNC: 4.8 MMOL/L
POTASSIUM SERPL-SCNC: 4.8 MMOL/L (ref 3.5–5.1)
POTASSIUM SERPL-SCNC: 4.9 MMOL/L (ref 3.5–5.1)
POTASSIUM SERPL-SCNC: 5 MMOL/L (ref 3.5–5.1)
POTASSIUM SERPL-SCNC: 5.1 MMOL/L (ref 3.5–5.1)
POTASSIUM SERPL-SCNC: 5.2 MMOL/L (ref 3.5–5.1)
POTASSIUM SERPL-SCNC: 5.3 MMOL/L (ref 3.5–5.1)
POTASSIUM SERPL-SCNC: 5.4 MMOL/L (ref 3.5–5.1)
POTASSIUM SERPL-SCNC: 5.5 MMOL/L (ref 3.5–5.1)
POTASSIUM SERPL-SCNC: 5.6 MMOL/L (ref 3.5–5.1)
POTASSIUM SERPL-SCNC: 5.6 MMOL/L (ref 3.5–5.1)
POTASSIUM SERPL-SCNC: 5.7 MMOL/L (ref 3.5–5.1)
POTASSIUM SERPL-SCNC: 5.7 MMOL/L (ref 3.5–5.1)
POTASSIUM SERPL-SCNC: 5.8 MMOL/L (ref 3.5–5.1)
PROCALCITONIN SERPL IA-MCNC: 0.53 NG/ML
PROCALCITONIN SERPL IA-MCNC: 0.53 NG/ML
PROT SERPL-MCNC: 5.6 G/DL (ref 6–8.4)
PROT SERPL-MCNC: 5.7 G/DL (ref 6–8.4)
PROT SERPL-MCNC: 5.7 G/DL (ref 6–8.4)
PROT SERPL-MCNC: 5.9 G/DL (ref 6–8.4)
PROT SERPL-MCNC: 6 G/DL (ref 6–8.4)
PROT SERPL-MCNC: 6.2 G/DL (ref 6–8.4)
PROT SERPL-MCNC: 6.3 G/DL (ref 6–8.4)
PROT SERPL-MCNC: 6.4 G/DL (ref 6–8.4)
PROT SERPL-MCNC: 6.5 G/DL (ref 6–8.4)
PROT SERPL-MCNC: 6.5 G/DL (ref 6–8.4)
PROT SERPL-MCNC: 6.6 G/DL (ref 6–8.4)
PROT SERPL-MCNC: 6.7 G/DL (ref 6–8.4)
PROT SERPL-MCNC: 6.7 G/DL (ref 6–8.4)
PROT SERPL-MCNC: 6.8 G/DL
PROT SERPL-MCNC: 6.8 G/DL (ref 6–8.4)
PROT SERPL-MCNC: 6.9 G/DL (ref 6–8.4)
PROT SERPL-MCNC: 7.1 G/DL (ref 6–8.4)
PROT SERPL-MCNC: 7.2 G/DL (ref 6–8.4)
PROT SERPL-MCNC: 7.3 G/DL (ref 6–8.4)
PROT SERPL-MCNC: 7.4 G/DL
PROT SERPL-MCNC: 7.4 G/DL (ref 6–8.4)
PROT SERPL-MCNC: 7.5 G/DL
PROT SERPL-MCNC: 7.5 G/DL (ref 6–8.4)
PROT SERPL-MCNC: 7.5 G/DL (ref 6–8.4)
PROT SERPL-MCNC: 7.6 G/DL (ref 6–8.4)
PROT SERPL-MCNC: 7.7 G/DL (ref 6–8.4)
PROT SERPL-MCNC: 7.8 G/DL (ref 6–8.4)
PROT SERPL-MCNC: 7.9 G/DL
PROT SERPL-MCNC: 7.9 G/DL (ref 6–8.4)
PROT SERPL-MCNC: 8.2 G/DL (ref 6–8.4)
PROT SERPL-MCNC: 8.3 G/DL (ref 6–8.4)
PROT SERPL-MCNC: 9.2 G/DL (ref 6–8.4)
PROT SERPL-MCNC: 9.8 G/DL (ref 6–8.4)
PROTHROMBIN TIME: 10.9 SEC (ref 9–12.5)
PROTHROMBIN TIME: 11.1 SEC (ref 9–12.5)
PROTHROMBIN TIME: 11.2 SEC (ref 9–12.5)
PROTHROMBIN TIME: 11.3 SEC (ref 9–12.5)
PROTHROMBIN TIME: 11.4 SEC (ref 9–12.5)
PROTHROMBIN TIME: 11.4 SEC (ref 9–12.5)
PROTHROMBIN TIME: 11.5 SEC (ref 9–12.5)
PROTHROMBIN TIME: 11.6 SEC (ref 9–12.5)
PROTHROMBIN TIME: 11.6 SEC (ref 9–12.5)
PROTHROMBIN TIME: 11.8 SEC (ref 9–12.5)
PROTHROMBIN TIME: 11.9 SEC (ref 9–12.5)
PROTHROMBIN TIME: 11.9 SEC (ref 9–12.5)
PROTHROMBIN TIME: 12 SEC (ref 9–12.5)
PROTHROMBIN TIME: 12.1 SEC (ref 9–12.5)
PROTHROMBIN TIME: 12.2 SEC (ref 9–12.5)
PROTHROMBIN TIME: 12.3 SEC (ref 9–12.5)
PROTHROMBIN TIME: 12.7 SEC (ref 9–12.5)
PROTHROMBIN TIME: 12.7 SEC (ref 9–12.5)
PROTHROMBIN TIME: 12.8 SEC (ref 9–12.5)
PROTHROMBIN TIME: 12.8 SEC (ref 9–12.5)
PROTHROMBIN TIME: 13.1 SEC (ref 9–12.5)
PS: 12
PTH-INTACT SERPL-MCNC: 107 PG/ML (ref 9–77)
PTH-INTACT SERPL-MCNC: 107 PG/ML (ref 9–77)
PTH-INTACT SERPL-MCNC: 300 PG/ML (ref 9–77)
PTH-INTACT SERPL-MCNC: 406.9 PG/ML (ref 9–77)
PTH-INTACT SERPL-MCNC: 531 PG/ML (ref 9–77)
PTH-INTACT SERPL-MCNC: 87 PG/ML (ref 9–77)
PTH-INTACT SERPL-MCNC: >2500 PG/ML (ref 9–77)
RBC # BLD AUTO: 2.58 M/UL (ref 4–5.4)
RBC # BLD AUTO: 2.68 M/UL (ref 4–5.4)
RBC # BLD AUTO: 2.71 M/UL (ref 4–5.4)
RBC # BLD AUTO: 2.75 M/UL (ref 4–5.4)
RBC # BLD AUTO: 2.76 M/UL (ref 4–5.4)
RBC # BLD AUTO: 2.76 M/UL (ref 4–5.4)
RBC # BLD AUTO: 2.79 M/UL (ref 4–5.4)
RBC # BLD AUTO: 2.8 M/UL (ref 4–5.4)
RBC # BLD AUTO: 2.82 M/UL (ref 4–5.4)
RBC # BLD AUTO: 2.82 M/UL (ref 4–5.4)
RBC # BLD AUTO: 2.83 M/UL (ref 4–5.4)
RBC # BLD AUTO: 2.84 M/UL (ref 4–5.4)
RBC # BLD AUTO: 2.86 M/UL (ref 4–5.4)
RBC # BLD AUTO: 2.93 M/UL (ref 4–5.4)
RBC # BLD AUTO: 2.94 M/UL (ref 4–5.4)
RBC # BLD AUTO: 2.94 M/UL (ref 4–5.4)
RBC # BLD AUTO: 2.95 M/UL (ref 4–5.4)
RBC # BLD AUTO: 3 M/UL (ref 4–5.4)
RBC # BLD AUTO: 3.01 M/UL (ref 4–5.4)
RBC # BLD AUTO: 3.03 M/UL (ref 4–5.4)
RBC # BLD AUTO: 3.04 M/UL (ref 4–5.4)
RBC # BLD AUTO: 3.06 M/UL (ref 4–5.4)
RBC # BLD AUTO: 3.08 M/UL (ref 4–5.4)
RBC # BLD AUTO: 3.08 M/UL (ref 4–5.4)
RBC # BLD AUTO: 3.1 M/UL (ref 4–5.4)
RBC # BLD AUTO: 3.17 M/UL (ref 4–5.4)
RBC # BLD AUTO: 3.18 M/UL (ref 4–5.4)
RBC # BLD AUTO: 3.27 M/UL (ref 4–5.4)
RBC # BLD AUTO: 3.28 M/UL (ref 4–5.4)
RBC # BLD AUTO: 3.31 M/UL (ref 4–5.4)
RBC # BLD AUTO: 3.34 M/UL (ref 4–5.4)
RBC # BLD AUTO: 3.37 M/UL (ref 4–5.4)
RBC # BLD AUTO: 3.41 M/UL (ref 4–5.4)
RBC # BLD AUTO: 3.43 M/UL (ref 4–5.4)
RBC # BLD AUTO: 3.46 M/UL (ref 4–5.4)
RBC # BLD AUTO: 3.51 M/UL (ref 4–5.4)
RBC # BLD AUTO: 3.54 M/UL (ref 4–5.4)
RBC # BLD AUTO: 3.58 M/UL (ref 4–5.4)
RBC # BLD AUTO: 3.6 M/UL (ref 4–5.4)
RBC # BLD AUTO: 3.6 M/UL (ref 4–5.4)
RBC # BLD AUTO: 3.63 M/UL
RBC # BLD AUTO: 3.72 M/UL (ref 4–5.4)
RBC # BLD AUTO: 3.77 M/UL (ref 4–5.4)
RBC # BLD AUTO: 3.83 M/UL
RBC # BLD AUTO: 3.85 M/UL (ref 4–5.4)
RBC # BLD AUTO: 3.9 M/UL (ref 4–5.4)
RBC # BLD AUTO: 3.92 M/UL (ref 4–5.4)
RBC # BLD AUTO: 4.01 M/UL (ref 4–5.4)
RBC # BLD AUTO: 4.07 M/UL
RBC # BLD AUTO: 4.13 M/UL (ref 4–5.4)
RBC # BLD AUTO: 4.14 M/UL (ref 4–5.4)
RBC # BLD AUTO: 4.16 M/UL
RBC # BLD AUTO: 4.18 M/UL (ref 4–5.4)
RBC # BLD AUTO: 4.2 M/UL (ref 4–5.4)
RBC # BLD AUTO: 4.24 M/UL (ref 4–5.4)
RBC # BLD AUTO: 4.41 M/UL (ref 4–5.4)
RBC # BLD AUTO: 4.41 M/UL (ref 4–5.4)
RBC # BLD AUTO: 4.49 M/UL (ref 4–5.4)
RBC # BLD AUTO: 4.65 M/UL (ref 4–5.4)
RBC # BLD AUTO: 4.82 M/UL (ref 4–5.4)
RBC # BLD AUTO: 4.87 M/UL (ref 4–5.4)
RBC # BLD AUTO: 4.98 M/UL (ref 4–5.4)
RBC # BLD AUTO: 5.87 M/UL (ref 4–5.4)
RBC # BLD AUTO: 6.13 M/UL (ref 4–5.4)
RETICS/RBC NFR AUTO: 3.9 % (ref 0.5–2.5)
RVP - ADENOVIRUS: NOT DETECTED
RVP - HUMAN METAPNEUMOVIRUS (HMPV): NOT DETECTED
RVP - INFLUENZA A: NOT DETECTED
RVP - INFLUENZA B: NOT DETECTED
RVP - RESPIRATORY SYNCTIAL VIRUS (RSV) A: NOT DETECTED
RVP - RESPIRATORY VIRAL PANEL, SOURCE: NORMAL
RVP - RHINOVIRUS: NOT DETECTED
SAMPLE: ABNORMAL
SATURATED IRON: 21 % (ref 20–50)
SCHISTOCYTES BLD QL SMEAR: ABNORMAL
SCHISTOCYTES BLD QL SMEAR: ABNORMAL
SITE: ABNORMAL
SODIUM BLD-SCNC: 132 MMOL/L (ref 136–145)
SODIUM BLD-SCNC: 133 MMOL/L (ref 136–145)
SODIUM BLD-SCNC: 134 MMOL/L (ref 136–145)
SODIUM BLD-SCNC: 137 MMOL/L (ref 136–145)
SODIUM BLD-SCNC: 139 MMOL/L (ref 136–145)
SODIUM BLD-SCNC: 140 MMOL/L (ref 136–145)
SODIUM SERPL-SCNC: 130 MMOL/L (ref 136–145)
SODIUM SERPL-SCNC: 131 MMOL/L (ref 136–145)
SODIUM SERPL-SCNC: 132 MMOL/L (ref 136–145)
SODIUM SERPL-SCNC: 132 MMOL/L (ref 136–145)
SODIUM SERPL-SCNC: 133 MMOL/L (ref 136–145)
SODIUM SERPL-SCNC: 134 MMOL/L (ref 136–145)
SODIUM SERPL-SCNC: 135 MMOL/L
SODIUM SERPL-SCNC: 135 MMOL/L (ref 136–145)
SODIUM SERPL-SCNC: 136 MMOL/L (ref 136–145)
SODIUM SERPL-SCNC: 137 MMOL/L (ref 136–145)
SODIUM SERPL-SCNC: 138 MMOL/L
SODIUM SERPL-SCNC: 138 MMOL/L (ref 136–145)
SODIUM SERPL-SCNC: 139 MMOL/L
SODIUM SERPL-SCNC: 139 MMOL/L
SODIUM SERPL-SCNC: 139 MMOL/L (ref 136–145)
SODIUM SERPL-SCNC: 140 MMOL/L (ref 136–145)
SODIUM SERPL-SCNC: 141 MMOL/L (ref 136–145)
SODIUM SERPL-SCNC: 142 MMOL/L (ref 136–145)
SP02: 100
SP02: 97
SP02: 99
SPECIMEN SOURCE: NORMAL
TACROLIMUS BLD-MCNC: 1.5 NG/ML (ref 5–15)
TACROLIMUS BLD-MCNC: 1.7 NG/ML
TACROLIMUS BLD-MCNC: 1.9 NG/ML (ref 5–15)
TACROLIMUS BLD-MCNC: 1.9 NG/ML (ref 5–15)
TACROLIMUS BLD-MCNC: 2.1 NG/ML (ref 5–15)
TACROLIMUS BLD-MCNC: 2.2 NG/ML
TACROLIMUS BLD-MCNC: 2.3 NG/ML (ref 5–15)
TACROLIMUS BLD-MCNC: 2.4 NG/ML (ref 5–15)
TACROLIMUS BLD-MCNC: 2.5 NG/ML (ref 5–15)
TACROLIMUS BLD-MCNC: 2.6 NG/ML (ref 5–15)
TACROLIMUS BLD-MCNC: 2.7 NG/ML (ref 5–15)
TACROLIMUS BLD-MCNC: 2.8 NG/ML (ref 5–15)
TACROLIMUS BLD-MCNC: 2.9 NG/ML (ref 5–15)
TACROLIMUS BLD-MCNC: 3.1 NG/ML (ref 5–15)
TACROLIMUS BLD-MCNC: 3.1 NG/ML (ref 5–15)
TACROLIMUS BLD-MCNC: 3.2 NG/ML (ref 5–15)
TACROLIMUS BLD-MCNC: 3.2 NG/ML (ref 5–15)
TACROLIMUS BLD-MCNC: 3.3 NG/ML (ref 5–15)
TACROLIMUS BLD-MCNC: 3.4 NG/ML (ref 5–15)
TACROLIMUS BLD-MCNC: 3.4 NG/ML (ref 5–15)
TACROLIMUS BLD-MCNC: 3.9 NG/ML (ref 5–15)
TACROLIMUS BLD-MCNC: 4 NG/ML (ref 5–15)
TACROLIMUS BLD-MCNC: 4.1 NG/ML (ref 5–15)
TACROLIMUS BLD-MCNC: 4.4 NG/ML (ref 5–15)
TACROLIMUS BLD-MCNC: 4.8 NG/ML (ref 5–15)
TACROLIMUS BLD-MCNC: 4.9 NG/ML (ref 5–15)
TACROLIMUS BLD-MCNC: 5.1 NG/ML (ref 5–15)
TACROLIMUS BLD-MCNC: 5.2 NG/ML (ref 5–15)
TACROLIMUS BLD-MCNC: 5.4 NG/ML (ref 5–15)
TACROLIMUS BLD-MCNC: 5.9 NG/ML (ref 5–15)
TACROLIMUS BLD-MCNC: 6 NG/ML (ref 5–15)
TACROLIMUS BLD-MCNC: 6.2 NG/ML (ref 5–15)
TACROLIMUS BLD-MCNC: 6.3 NG/ML (ref 5–15)
TACROLIMUS BLD-MCNC: 6.4 NG/ML (ref 5–15)
TACROLIMUS BLD-MCNC: 6.7 NG/ML (ref 5–15)
TACROLIMUS BLD-MCNC: 7.1 NG/ML (ref 5–15)
TACROLIMUS BLD-MCNC: 8.6 NG/ML (ref 5–15)
TACROLIMUS BLD-MCNC: 8.7 NG/ML (ref 5–15)
TACROLIMUS BLD-MCNC: 8.7 NG/ML (ref 5–15)
TACROLIMUS BLD-MCNC: 8.8 NG/ML (ref 5–15)
TACROLIMUS BLD-MCNC: <1.5 NG/ML
TACROLIMUS BLD-MCNC: <1.5 NG/ML (ref 5–15)
TACROLIMUS BLD-MCNC: <1.5 NG/ML (ref 5–15)
TOTAL IRON BINDING CAPACITY: 232 UG/DL (ref 250–450)
TOXIC GRANULES BLD QL SMEAR: PRESENT
TRANS ERYTHROCYTES VOL PATIENT: NORMAL ML
TRANS ERYTHROCYTES VOL PATIENT: NORMAL ML
TRANS PLATPHERESIS VOL PATIENT: NORMAL ML
TRANSFERRIN SERPL-MCNC: 157 MG/DL (ref 200–375)
TROPONIN I SERPL DL<=0.01 NG/ML-MCNC: 0.01 NG/ML (ref 0–0.03)
TROPONIN I SERPL DL<=0.01 NG/ML-MCNC: 0.02 NG/ML (ref 0–0.03)
TROPONIN I SERPL DL<=0.01 NG/ML-MCNC: 0.06 NG/ML
TSH SERPL DL<=0.005 MIU/L-ACNC: 3.46 UIU/ML (ref 0.4–4)
VT: 350
VT: 350
WBC # BLD AUTO: 11.26 K/UL (ref 3.9–12.7)
WBC # BLD AUTO: 11.39 K/UL (ref 3.9–12.7)
WBC # BLD AUTO: 2.24 K/UL (ref 3.9–12.7)
WBC # BLD AUTO: 2.39 K/UL (ref 3.9–12.7)
WBC # BLD AUTO: 2.53 K/UL (ref 3.9–12.7)
WBC # BLD AUTO: 2.57 K/UL (ref 3.9–12.7)
WBC # BLD AUTO: 2.64 K/UL (ref 3.9–12.7)
WBC # BLD AUTO: 2.75 K/UL (ref 3.9–12.7)
WBC # BLD AUTO: 2.8 K/UL (ref 3.9–12.7)
WBC # BLD AUTO: 2.81 K/UL (ref 3.9–12.7)
WBC # BLD AUTO: 2.91 K/UL (ref 3.9–12.7)
WBC # BLD AUTO: 2.92 K/UL
WBC # BLD AUTO: 2.93 K/UL (ref 3.9–12.7)
WBC # BLD AUTO: 2.95 K/UL (ref 3.9–12.7)
WBC # BLD AUTO: 3 K/UL (ref 3.9–12.7)
WBC # BLD AUTO: 3.02 K/UL (ref 3.9–12.7)
WBC # BLD AUTO: 3.04 K/UL
WBC # BLD AUTO: 3.17 K/UL (ref 3.9–12.7)
WBC # BLD AUTO: 3.19 K/UL (ref 3.9–12.7)
WBC # BLD AUTO: 3.21 K/UL (ref 3.9–12.7)
WBC # BLD AUTO: 3.23 K/UL (ref 3.9–12.7)
WBC # BLD AUTO: 3.26 K/UL (ref 3.9–12.7)
WBC # BLD AUTO: 3.33 K/UL (ref 3.9–12.7)
WBC # BLD AUTO: 3.48 K/UL (ref 3.9–12.7)
WBC # BLD AUTO: 3.61 K/UL (ref 3.9–12.7)
WBC # BLD AUTO: 3.62 K/UL (ref 3.9–12.7)
WBC # BLD AUTO: 3.64 K/UL (ref 3.9–12.7)
WBC # BLD AUTO: 3.69 K/UL (ref 3.9–12.7)
WBC # BLD AUTO: 3.7 K/UL
WBC # BLD AUTO: 3.72 K/UL (ref 3.9–12.7)
WBC # BLD AUTO: 3.83 K/UL (ref 3.9–12.7)
WBC # BLD AUTO: 3.88 K/UL (ref 3.9–12.7)
WBC # BLD AUTO: 3.92 K/UL (ref 3.9–12.7)
WBC # BLD AUTO: 4.09 K/UL (ref 3.9–12.7)
WBC # BLD AUTO: 4.16 K/UL (ref 3.9–12.7)
WBC # BLD AUTO: 4.19 K/UL
WBC # BLD AUTO: 4.22 K/UL (ref 3.9–12.7)
WBC # BLD AUTO: 4.27 K/UL (ref 3.9–12.7)
WBC # BLD AUTO: 4.29 K/UL (ref 3.9–12.7)
WBC # BLD AUTO: 4.3 K/UL (ref 3.9–12.7)
WBC # BLD AUTO: 4.54 K/UL (ref 3.9–12.7)
WBC # BLD AUTO: 4.68 K/UL (ref 3.9–12.7)
WBC # BLD AUTO: 4.79 K/UL (ref 3.9–12.7)
WBC # BLD AUTO: 4.99 K/UL (ref 3.9–12.7)
WBC # BLD AUTO: 5.06 K/UL (ref 3.9–12.7)
WBC # BLD AUTO: 5.08 K/UL (ref 3.9–12.7)
WBC # BLD AUTO: 5.16 K/UL (ref 3.9–12.7)
WBC # BLD AUTO: 5.33 K/UL (ref 3.9–12.7)
WBC # BLD AUTO: 5.37 K/UL (ref 3.9–12.7)
WBC # BLD AUTO: 5.74 K/UL (ref 3.9–12.7)
WBC # BLD AUTO: 5.78 K/UL (ref 3.9–12.7)
WBC # BLD AUTO: 5.92 K/UL (ref 3.9–12.7)
WBC # BLD AUTO: 6.1 K/UL (ref 3.9–12.7)
WBC # BLD AUTO: 6.14 K/UL (ref 3.9–12.7)
WBC # BLD AUTO: 6.98 K/UL (ref 3.9–12.7)
WBC # BLD AUTO: 7.29 K/UL (ref 3.9–12.7)
WBC # BLD AUTO: 7.52 K/UL (ref 3.9–12.7)
WBC # BLD AUTO: 7.57 K/UL (ref 3.9–12.7)
WBC # BLD AUTO: 7.95 K/UL (ref 3.9–12.7)
WBC # BLD AUTO: 8.18 K/UL (ref 3.9–12.7)
WBC # BLD AUTO: 8.28 K/UL (ref 3.9–12.7)
WBC # BLD AUTO: 8.49 K/UL (ref 3.9–12.7)
WBC # BLD AUTO: 8.71 K/UL (ref 3.9–12.7)
WBC # BLD AUTO: 8.79 K/UL (ref 3.9–12.7)
WBC TOXIC VACUOLES BLD QL SMEAR: PRESENT

## 2019-01-01 PROCEDURE — 99900035 HC TECH TIME PER 15 MIN (STAT): Mod: NTX

## 2019-01-01 PROCEDURE — 25000003 PHARM REV CODE 250: Mod: NTX | Performed by: STUDENT IN AN ORGANIZED HEALTH CARE EDUCATION/TRAINING PROGRAM

## 2019-01-01 PROCEDURE — 80053 COMPREHEN METABOLIC PANEL: CPT | Mod: NTX

## 2019-01-01 PROCEDURE — 84100 ASSAY OF PHOSPHORUS: CPT | Mod: 91,NTX

## 2019-01-01 PROCEDURE — 85610 PROTHROMBIN TIME: CPT | Mod: NTX

## 2019-01-01 PROCEDURE — 84100 ASSAY OF PHOSPHORUS: CPT | Mod: NTX

## 2019-01-01 PROCEDURE — 90935 PR HEMODIALYSIS, ONE EVALUATION: ICD-10-PCS | Mod: NTX,,, | Performed by: INTERNAL MEDICINE

## 2019-01-01 PROCEDURE — 63700000 PHARM REV CODE 250 ALT 637 W/O HCPCS: Mod: NTX | Performed by: PHYSICIAN ASSISTANT

## 2019-01-01 PROCEDURE — 99999 PR PBB SHADOW E&M-EST. PATIENT-LVL II: ICD-10-PCS | Mod: PBBFAC,,, | Performed by: OPHTHALMOLOGY

## 2019-01-01 PROCEDURE — 80197 ASSAY OF TACROLIMUS: CPT | Mod: NTX

## 2019-01-01 PROCEDURE — 85730 THROMBOPLASTIN TIME PARTIAL: CPT | Mod: NTX

## 2019-01-01 PROCEDURE — 37799 UNLISTED PX VASCULAR SURGERY: CPT | Mod: NTX

## 2019-01-01 PROCEDURE — 82330 ASSAY OF CALCIUM: CPT | Mod: NTX

## 2019-01-01 PROCEDURE — 85025 COMPLETE CBC W/AUTO DIFF WBC: CPT | Mod: NTX

## 2019-01-01 PROCEDURE — 63600175 PHARM REV CODE 636 W HCPCS: Mod: NTX | Performed by: STUDENT IN AN ORGANIZED HEALTH CARE EDUCATION/TRAINING PROGRAM

## 2019-01-01 PROCEDURE — 20600001 HC STEP DOWN PRIVATE ROOM: Mod: NTX

## 2019-01-01 PROCEDURE — 63600175 PHARM REV CODE 636 W HCPCS: Mod: NTX | Performed by: PHYSICIAN ASSISTANT

## 2019-01-01 PROCEDURE — 25000003 PHARM REV CODE 250: Mod: NTX | Performed by: PHYSICIAN ASSISTANT

## 2019-01-01 PROCEDURE — 36415 COLL VENOUS BLD VENIPUNCTURE: CPT | Mod: PO,NTX

## 2019-01-01 PROCEDURE — 99232 SBSQ HOSP IP/OBS MODERATE 35: CPT | Mod: GC,NTX,, | Performed by: INTERNAL MEDICINE

## 2019-01-01 PROCEDURE — 99999 PR PBB SHADOW E&M-EST. PATIENT-LVL IV: ICD-10-PCS | Mod: PBBFAC,,, | Performed by: PHYSICIAN ASSISTANT

## 2019-01-01 PROCEDURE — 83735 ASSAY OF MAGNESIUM: CPT | Mod: NTX

## 2019-01-01 PROCEDURE — 99291 CRITICAL CARE FIRST HOUR: CPT | Mod: GC,NTX,, | Performed by: PSYCHIATRY & NEUROLOGY

## 2019-01-01 PROCEDURE — 99999 PR PBB SHADOW E&M-EST. PATIENT-LVL III: ICD-10-PCS | Mod: PBBFAC,,, | Performed by: INTERNAL MEDICINE

## 2019-01-01 PROCEDURE — 99999 PR PBB SHADOW E&M-EST. PATIENT-LVL III: CPT | Mod: PBBFAC,TXP,, | Performed by: INTERNAL MEDICINE

## 2019-01-01 PROCEDURE — 99232 SBSQ HOSP IP/OBS MODERATE 35: CPT | Mod: NTX,,, | Performed by: PHYSICAL MEDICINE & REHABILITATION

## 2019-01-01 PROCEDURE — 99233 PR SUBSEQUENT HOSPITAL CARE,LEVL III: ICD-10-PCS | Mod: NTX,,, | Performed by: NURSE PRACTITIONER

## 2019-01-01 PROCEDURE — 99232 SBSQ HOSP IP/OBS MODERATE 35: CPT | Mod: NTX,,, | Performed by: HOSPITALIST

## 2019-01-01 PROCEDURE — 63700000 PHARM REV CODE 250 ALT 637 W/O HCPCS: Mod: NTX | Performed by: NURSE PRACTITIONER

## 2019-01-01 PROCEDURE — C9290 INJ, BUPIVACAINE LIPOSOME: HCPCS | Mod: TXP | Performed by: SURGERY

## 2019-01-01 PROCEDURE — 99233 PR SUBSEQUENT HOSPITAL CARE,LEVL III: ICD-10-PCS | Mod: GC,NTX,, | Performed by: ANESTHESIOLOGY

## 2019-01-01 PROCEDURE — 97535 SELF CARE MNGMENT TRAINING: CPT | Mod: NTX

## 2019-01-01 PROCEDURE — 87502 INFLUENZA DNA AMP PROBE: CPT | Mod: NTX

## 2019-01-01 PROCEDURE — D9220A PRA ANESTHESIA: Mod: CRNA,NTX,, | Performed by: NURSE ANESTHETIST, CERTIFIED REGISTERED

## 2019-01-01 PROCEDURE — 99214 PR OFFICE/OUTPT VISIT, EST, LEVL IV, 30-39 MIN: ICD-10-PCS | Mod: S$PBB,NTX,, | Performed by: NEUROLOGICAL SURGERY

## 2019-01-01 PROCEDURE — 25000003 PHARM REV CODE 250: Mod: NTX | Performed by: INTERNAL MEDICINE

## 2019-01-01 PROCEDURE — 83880 ASSAY OF NATRIURETIC PEPTIDE: CPT | Mod: NTX

## 2019-01-01 PROCEDURE — 93010 ELECTROCARDIOGRAM REPORT: CPT | Mod: NTX,,, | Performed by: INTERNAL MEDICINE

## 2019-01-01 PROCEDURE — 80197 ASSAY OF TACROLIMUS: CPT | Mod: TXP

## 2019-01-01 PROCEDURE — 99291 CRITICAL CARE FIRST HOUR: CPT | Mod: GC,NTX,, | Performed by: ANESTHESIOLOGY

## 2019-01-01 PROCEDURE — 63600175 PHARM REV CODE 636 W HCPCS: Mod: NTX | Performed by: NURSE ANESTHETIST, CERTIFIED REGISTERED

## 2019-01-01 PROCEDURE — 82330 ASSAY OF CALCIUM: CPT | Mod: 91,NTX

## 2019-01-01 PROCEDURE — 99222 1ST HOSP IP/OBS MODERATE 55: CPT | Mod: GC,NTX,, | Performed by: INTERNAL MEDICINE

## 2019-01-01 PROCEDURE — 25000003 PHARM REV CODE 250: Mod: NTX | Performed by: HOSPITALIST

## 2019-01-01 PROCEDURE — 99999 PR PBB SHADOW E&M-EST. PATIENT-LVL III: CPT | Mod: PBBFAC,TXP,, | Performed by: SURGERY

## 2019-01-01 PROCEDURE — 99214 PR OFFICE/OUTPT VISIT, EST, LEVL IV, 30-39 MIN: ICD-10-PCS | Mod: S$PBB,,, | Performed by: NURSE PRACTITIONER

## 2019-01-01 PROCEDURE — 25000003 PHARM REV CODE 250: Mod: NTX | Performed by: PSYCHIATRY & NEUROLOGY

## 2019-01-01 PROCEDURE — 99213 OFFICE O/P EST LOW 20 MIN: CPT | Mod: PBBFAC,27 | Performed by: INTERNAL MEDICINE

## 2019-01-01 PROCEDURE — 80048 BASIC METABOLIC PNL TOTAL CA: CPT | Mod: NTX

## 2019-01-01 PROCEDURE — 36415 COLL VENOUS BLD VENIPUNCTURE: CPT | Mod: TXP

## 2019-01-01 PROCEDURE — 99233 PR SUBSEQUENT HOSPITAL CARE,LEVL III: ICD-10-PCS | Mod: NTX,,, | Performed by: INTERNAL MEDICINE

## 2019-01-01 PROCEDURE — A4217 STERILE WATER/SALINE, 500 ML: HCPCS | Mod: NTX | Performed by: PHYSICIAN ASSISTANT

## 2019-01-01 PROCEDURE — 27000221 HC OXYGEN, UP TO 24 HOURS: Mod: TXP

## 2019-01-01 PROCEDURE — 99999 PR PBB SHADOW E&M-EST. PATIENT-LVL III: ICD-10-PCS | Mod: PBBFAC,TXP,, | Performed by: NEUROLOGICAL SURGERY

## 2019-01-01 PROCEDURE — 85610 PROTHROMBIN TIME: CPT | Mod: TXP

## 2019-01-01 PROCEDURE — P9035 PLATELET PHERES LEUKOREDUCED: HCPCS | Mod: NTX

## 2019-01-01 PROCEDURE — 96375 TX/PRO/DX INJ NEW DRUG ADDON: CPT | Mod: NTX

## 2019-01-01 PROCEDURE — 86901 BLOOD TYPING SEROLOGIC RH(D): CPT | Mod: NTX

## 2019-01-01 PROCEDURE — 12000002 HC ACUTE/MED SURGE SEMI-PRIVATE ROOM: Mod: NTX

## 2019-01-01 PROCEDURE — 99999 PR PBB SHADOW E&M-EST. PATIENT-LVL IV: CPT | Mod: PBBFAC,TXP,, | Performed by: INTERNAL MEDICINE

## 2019-01-01 PROCEDURE — 63600175 PHARM REV CODE 636 W HCPCS: Mod: NTX | Performed by: HOSPITALIST

## 2019-01-01 PROCEDURE — 25000003 PHARM REV CODE 250: Mod: NTX | Performed by: SURGERY

## 2019-01-01 PROCEDURE — 85384 FIBRINOGEN ACTIVITY: CPT | Mod: 91,NTX

## 2019-01-01 PROCEDURE — 36415 COLL VENOUS BLD VENIPUNCTURE: CPT | Mod: NTX

## 2019-01-01 PROCEDURE — 97530 THERAPEUTIC ACTIVITIES: CPT | Mod: NTX

## 2019-01-01 PROCEDURE — 25000003 PHARM REV CODE 250: Mod: NTX | Performed by: NURSE PRACTITIONER

## 2019-01-01 PROCEDURE — 99213 OFFICE O/P EST LOW 20 MIN: CPT | Mod: PBBFAC | Performed by: NURSE PRACTITIONER

## 2019-01-01 PROCEDURE — 63600175 PHARM REV CODE 636 W HCPCS: Mod: NTX | Performed by: NURSE PRACTITIONER

## 2019-01-01 PROCEDURE — 99213 OFFICE O/P EST LOW 20 MIN: CPT | Mod: S$PBB,,, | Performed by: OBSTETRICS & GYNECOLOGY

## 2019-01-01 PROCEDURE — 93005 ELECTROCARDIOGRAM TRACING: CPT | Mod: NTX

## 2019-01-01 PROCEDURE — 83690 ASSAY OF LIPASE: CPT | Mod: NTX

## 2019-01-01 PROCEDURE — 99212 OFFICE O/P EST SF 10 MIN: CPT | Mod: PBBFAC | Performed by: OBSTETRICS & GYNECOLOGY

## 2019-01-01 PROCEDURE — 80100016 HC MAINTENANCE HEMODIALYSIS: Mod: NTX

## 2019-01-01 PROCEDURE — 80177 DRUG SCRN QUAN LEVETIRACETAM: CPT | Mod: NTX

## 2019-01-01 PROCEDURE — 99223 PR INITIAL HOSPITAL CARE,LEVL III: ICD-10-PCS | Mod: 25,NTX,, | Performed by: NURSE PRACTITIONER

## 2019-01-01 PROCEDURE — 99495 TRANSJ CARE MGMT MOD F2F 14D: CPT | Mod: PBBFAC | Performed by: PHYSICIAN ASSISTANT

## 2019-01-01 PROCEDURE — 99214 PR OFFICE/OUTPT VISIT, EST, LEVL IV, 30-39 MIN: ICD-10-PCS | Mod: S$PBB,TXP,, | Performed by: INTERNAL MEDICINE

## 2019-01-01 PROCEDURE — 99232 PR SUBSEQUENT HOSPITAL CARE,LEVL II: ICD-10-PCS | Mod: NTX,,, | Performed by: HOSPITALIST

## 2019-01-01 PROCEDURE — 99233 SBSQ HOSP IP/OBS HIGH 50: CPT | Mod: NTX,,, | Performed by: NURSE PRACTITIONER

## 2019-01-01 PROCEDURE — 85384 FIBRINOGEN ACTIVITY: CPT | Mod: NTX

## 2019-01-01 PROCEDURE — 76705 ECHO EXAM OF ABDOMEN: CPT | Mod: 26,NTX,, | Performed by: RADIOLOGY

## 2019-01-01 PROCEDURE — 61510 CRNEC TREPH EXC BRN TUM STTL: CPT | Mod: GC,NTX,, | Performed by: NEUROLOGICAL SURGERY

## 2019-01-01 PROCEDURE — 36620 ARTERIAL LINE: ICD-10-PCS | Mod: NTX,,, | Performed by: NURSE PRACTITIONER

## 2019-01-01 PROCEDURE — 99213 PR OFFICE/OUTPT VISIT, EST, LEVL III, 20-29 MIN: ICD-10-PCS | Mod: S$PBB,,, | Performed by: OBSTETRICS & GYNECOLOGY

## 2019-01-01 PROCEDURE — 99233 SBSQ HOSP IP/OBS HIGH 50: CPT | Mod: NTX,,, | Performed by: INTERNAL MEDICINE

## 2019-01-01 PROCEDURE — 99214 OFFICE O/P EST MOD 30 MIN: CPT | Mod: PBBFAC | Performed by: PHYSICIAN ASSISTANT

## 2019-01-01 PROCEDURE — 99285 PR EMERGENCY DEPT VISIT,LEVEL V: ICD-10-PCS | Mod: NTX,,, | Performed by: EMERGENCY MEDICINE

## 2019-01-01 PROCEDURE — 99223 PR INITIAL HOSPITAL CARE,LEVL III: ICD-10-PCS | Mod: GC,NTX,, | Performed by: INTERNAL MEDICINE

## 2019-01-01 PROCEDURE — 83605 ASSAY OF LACTIC ACID: CPT | Mod: NTX

## 2019-01-01 PROCEDURE — 36000710: Mod: NTX | Performed by: NEUROLOGICAL SURGERY

## 2019-01-01 PROCEDURE — 70450 CT HEAD/BRAIN W/O DYE: CPT | Mod: 26,NTX,, | Performed by: RADIOLOGY

## 2019-01-01 PROCEDURE — 25000003 PHARM REV CODE 250: Mod: NTX | Performed by: NURSE ANESTHETIST, CERTIFIED REGISTERED

## 2019-01-01 PROCEDURE — 80100014 HC HEMODIALYSIS 1:1: Mod: NTX

## 2019-01-01 PROCEDURE — 99999 PR PBB SHADOW E&M-EST. PATIENT-LVL III: CPT | Mod: PBBFAC,,, | Performed by: INTERNAL MEDICINE

## 2019-01-01 PROCEDURE — 99291 PR CRITICAL CARE, E/M 30-74 MINUTES: ICD-10-PCS | Mod: GC,NTX,, | Performed by: PSYCHIATRY & NEUROLOGY

## 2019-01-01 PROCEDURE — 60500 EXPLORE PARATHYROID GLANDS: CPT | Mod: GC,NTX,, | Performed by: SURGERY

## 2019-01-01 PROCEDURE — 61510 PR EXCIS SUPRATENT BRAIN TUMOR: ICD-10-PCS | Mod: GC,NTX,, | Performed by: NEUROLOGICAL SURGERY

## 2019-01-01 PROCEDURE — 99284 PR EMERGENCY DEPT VISIT,LEVEL IV: ICD-10-PCS | Mod: NTX,,, | Performed by: EMERGENCY MEDICINE

## 2019-01-01 PROCEDURE — 96374 THER/PROPH/DIAG INJ IV PUSH: CPT | Mod: NTX

## 2019-01-01 PROCEDURE — 36000711: Mod: NTX | Performed by: NEUROLOGICAL SURGERY

## 2019-01-01 PROCEDURE — 83970 ASSAY OF PARATHORMONE: CPT | Mod: TXP

## 2019-01-01 PROCEDURE — 99233 PR SUBSEQUENT HOSPITAL CARE,LEVL III: ICD-10-PCS | Mod: GC,NTX,, | Performed by: HOSPITALIST

## 2019-01-01 PROCEDURE — 99292 CRITICAL CARE ADDL 30 MIN: CPT | Mod: NTX,,, | Performed by: PSYCHIATRY & NEUROLOGY

## 2019-01-01 PROCEDURE — 69990 MICROSURGERY ADD-ON: CPT | Mod: 59,NTX,, | Performed by: NEUROLOGICAL SURGERY

## 2019-01-01 PROCEDURE — 94010 BREATHING CAPACITY TEST: CPT | Mod: NTX

## 2019-01-01 PROCEDURE — 99214 PR OFFICE/OUTPT VISIT, EST, LEVL IV, 30-39 MIN: ICD-10-PCS | Mod: S$PBB,NTX,, | Performed by: INTERNAL MEDICINE

## 2019-01-01 PROCEDURE — 99232 SBSQ HOSP IP/OBS MODERATE 35: CPT | Mod: NTX,,, | Performed by: NURSE PRACTITIONER

## 2019-01-01 PROCEDURE — 86850 RBC ANTIBODY SCREEN: CPT | Mod: NTX

## 2019-01-01 PROCEDURE — 94761 N-INVAS EAR/PLS OXIMETRY MLT: CPT | Mod: NTX

## 2019-01-01 PROCEDURE — 25000003 PHARM REV CODE 250: Mod: TXP | Performed by: STUDENT IN AN ORGANIZED HEALTH CARE EDUCATION/TRAINING PROGRAM

## 2019-01-01 PROCEDURE — 99285 EMERGENCY DEPT VISIT HI MDM: CPT | Mod: 25,NTX

## 2019-01-01 PROCEDURE — 99999 PR PBB SHADOW E&M-EST. PATIENT-LVL IV: CPT | Mod: PBBFAC,TXP,, | Performed by: SURGERY

## 2019-01-01 PROCEDURE — 92526 ORAL FUNCTION THERAPY: CPT | Mod: NTX

## 2019-01-01 PROCEDURE — 90935 HEMODIALYSIS ONE EVALUATION: CPT | Mod: NTX,,, | Performed by: NURSE PRACTITIONER

## 2019-01-01 PROCEDURE — 36600 WITHDRAWAL OF ARTERIAL BLOOD: CPT | Mod: NTX

## 2019-01-01 PROCEDURE — 99231 SBSQ HOSP IP/OBS SF/LOW 25: CPT | Mod: NTX,,, | Performed by: INTERNAL MEDICINE

## 2019-01-01 PROCEDURE — 11000001 HC ACUTE MED/SURG PRIVATE ROOM: Mod: NTX

## 2019-01-01 PROCEDURE — 83036 HEMOGLOBIN GLYCOSYLATED A1C: CPT | Mod: NTX

## 2019-01-01 PROCEDURE — 84702 CHORIONIC GONADOTROPIN TEST: CPT | Mod: NTX

## 2019-01-01 PROCEDURE — 99214 OFFICE O/P EST MOD 30 MIN: CPT | Mod: NTX,,, | Performed by: PHYSICIAN ASSISTANT

## 2019-01-01 PROCEDURE — 94003 VENT MGMT INPAT SUBQ DAY: CPT | Mod: NTX

## 2019-01-01 PROCEDURE — 99233 SBSQ HOSP IP/OBS HIGH 50: CPT | Mod: GC,NTX,, | Performed by: PSYCHIATRY & NEUROLOGY

## 2019-01-01 PROCEDURE — D9220A PRA ANESTHESIA: ICD-10-PCS | Mod: CRNA,NTX,, | Performed by: NURSE ANESTHETIST, CERTIFIED REGISTERED

## 2019-01-01 PROCEDURE — 86920 COMPATIBILITY TEST SPIN: CPT | Mod: NTX

## 2019-01-01 PROCEDURE — 92507 TX SP LANG VOICE COMM INDIV: CPT | Mod: NTX

## 2019-01-01 PROCEDURE — 99213 OFFICE O/P EST LOW 20 MIN: CPT | Mod: PBBFAC,25,NTX | Performed by: NEUROLOGICAL SURGERY

## 2019-01-01 PROCEDURE — 99214 PR OFFICE/OUTPT VISIT, EST, LEVL IV, 30-39 MIN: ICD-10-PCS | Mod: NTX,,, | Performed by: PHYSICIAN ASSISTANT

## 2019-01-01 PROCEDURE — 63600175 PHARM REV CODE 636 W HCPCS: Mod: NTX | Performed by: EMERGENCY MEDICINE

## 2019-01-01 PROCEDURE — 99900026 HC AIRWAY MAINTENANCE (STAT): Mod: NTX

## 2019-01-01 PROCEDURE — 95951 PR EEG MONITORING/VIDEORECORD: CPT | Mod: 26,NTX,, | Performed by: PSYCHIATRY & NEUROLOGY

## 2019-01-01 PROCEDURE — 96376 TX/PRO/DX INJ SAME DRUG ADON: CPT | Mod: NTX

## 2019-01-01 PROCEDURE — 88305 TISSUE SPECIMEN TO PATHOLOGY - SURGERY: ICD-10-PCS | Mod: 26,,, | Performed by: PATHOLOGY

## 2019-01-01 PROCEDURE — 99232 PR SUBSEQUENT HOSPITAL CARE,LEVL II: ICD-10-PCS | Mod: NTX,,, | Performed by: INTERNAL MEDICINE

## 2019-01-01 PROCEDURE — 84132 ASSAY OF SERUM POTASSIUM: CPT | Mod: NTX

## 2019-01-01 PROCEDURE — 70450 CT HEAD/BRAIN W/O DYE: CPT | Mod: TC,TXP

## 2019-01-01 PROCEDURE — 99222 PR INITIAL HOSPITAL CARE,LEVL II: ICD-10-PCS | Mod: GC,NTX,, | Performed by: INTERNAL MEDICINE

## 2019-01-01 PROCEDURE — 96361 HYDRATE IV INFUSION ADD-ON: CPT | Mod: NTX

## 2019-01-01 PROCEDURE — 85025 COMPLETE CBC W/AUTO DIFF WBC: CPT | Mod: TXP

## 2019-01-01 PROCEDURE — 99238 PR HOSPITAL DISCHARGE DAY,<30 MIN: ICD-10-PCS | Mod: NTX,,, | Performed by: HOSPITALIST

## 2019-01-01 PROCEDURE — 99999 PR PBB SHADOW E&M-EST. PATIENT-LVL IV: ICD-10-PCS | Mod: PBBFAC,TXP,, | Performed by: SURGERY

## 2019-01-01 PROCEDURE — 99217 PR OBSERVATION CARE DISCHARGE: CPT | Mod: NTX,,, | Performed by: INTERNAL MEDICINE

## 2019-01-01 PROCEDURE — C1713 ANCHOR/SCREW BN/BN,TIS/BN: HCPCS | Mod: NTX | Performed by: NEUROLOGICAL SURGERY

## 2019-01-01 PROCEDURE — 90935 HEMODIALYSIS ONE EVALUATION: CPT | Mod: NTX

## 2019-01-01 PROCEDURE — 25000003 PHARM REV CODE 250: Mod: ER,NTX | Performed by: EMERGENCY MEDICINE

## 2019-01-01 PROCEDURE — 25000003 PHARM REV CODE 250: Mod: TXP | Performed by: SURGERY

## 2019-01-01 PROCEDURE — 99232 SBSQ HOSP IP/OBS MODERATE 35: CPT | Mod: NTX,,, | Performed by: INTERNAL MEDICINE

## 2019-01-01 PROCEDURE — 99214 OFFICE O/P EST MOD 30 MIN: CPT | Mod: PBBFAC,TXP | Performed by: INTERNAL MEDICINE

## 2019-01-01 PROCEDURE — 99214 OFFICE O/P EST MOD 30 MIN: CPT | Mod: S$PBB,,, | Performed by: INTERNAL MEDICINE

## 2019-01-01 PROCEDURE — 63600175 PHARM REV CODE 636 W HCPCS: Mod: NTX | Performed by: NEUROLOGICAL SURGERY

## 2019-01-01 PROCEDURE — 63600175 PHARM REV CODE 636 W HCPCS: Mod: NTX | Performed by: INTERNAL MEDICINE

## 2019-01-01 PROCEDURE — 99213 PR OFFICE/OUTPT VISIT, EST, LEVL III, 20-29 MIN: ICD-10-PCS | Mod: S$PBB,NTX,, | Performed by: SURGERY

## 2019-01-01 PROCEDURE — 70450 CT HEAD WITHOUT CONTRAST: ICD-10-PCS | Mod: 26,NTX,, | Performed by: RADIOLOGY

## 2019-01-01 PROCEDURE — 99214 OFFICE O/P EST MOD 30 MIN: CPT | Mod: S$PBB,NTX,, | Performed by: INTERNAL MEDICINE

## 2019-01-01 PROCEDURE — 69990 PR MICROSURG TECHNIQUES,REQ OPER MICROSCOPE: ICD-10-PCS | Mod: 59,NTX,, | Performed by: NEUROLOGICAL SURGERY

## 2019-01-01 PROCEDURE — 99213 OFFICE O/P EST LOW 20 MIN: CPT | Mod: S$PBB,NTX,, | Performed by: SURGERY

## 2019-01-01 PROCEDURE — 99999 PR PBB SHADOW E&M-EST. PATIENT-LVL III: CPT | Mod: PBBFAC,TXP,, | Performed by: NEUROLOGICAL SURGERY

## 2019-01-01 PROCEDURE — 99284 EMERGENCY DEPT VISIT MOD MDM: CPT | Mod: 25,ER,NTX

## 2019-01-01 PROCEDURE — 63600175 PHARM REV CODE 636 W HCPCS: Mod: TXP | Performed by: STUDENT IN AN ORGANIZED HEALTH CARE EDUCATION/TRAINING PROGRAM

## 2019-01-01 PROCEDURE — 92523 SPEECH SOUND LANG COMPREHEN: CPT | Mod: NTX

## 2019-01-01 PROCEDURE — P9021 RED BLOOD CELLS UNIT: HCPCS | Mod: NTX

## 2019-01-01 PROCEDURE — 93010 EKG 12-LEAD: ICD-10-PCS | Mod: NTX,,, | Performed by: INTERNAL MEDICINE

## 2019-01-01 PROCEDURE — G0378 HOSPITAL OBSERVATION PER HR: HCPCS | Mod: NTX

## 2019-01-01 PROCEDURE — 85027 COMPLETE CBC AUTOMATED: CPT | Mod: NTX

## 2019-01-01 PROCEDURE — 99223 1ST HOSP IP/OBS HIGH 75: CPT | Mod: GC,NTX,, | Performed by: INTERNAL MEDICINE

## 2019-01-01 PROCEDURE — 80048 BASIC METABOLIC PNL TOTAL CA: CPT | Mod: 91,NTX

## 2019-01-01 PROCEDURE — 99024 POSTOP FOLLOW-UP VISIT: CPT | Mod: POP,NTX,, | Performed by: PHYSICIAN ASSISTANT

## 2019-01-01 PROCEDURE — 92083 HUMPHREY VISUAL FIELD - OU - BOTH EYES: ICD-10-PCS | Mod: 26,S$PBB,, | Performed by: OPHTHALMOLOGY

## 2019-01-01 PROCEDURE — 99220 PR INITIAL OBSERVATION CARE,LEVL III: ICD-10-PCS | Mod: NTX,,, | Performed by: INTERNAL MEDICINE

## 2019-01-01 PROCEDURE — 87340 HEPATITIS B SURFACE AG IA: CPT | Mod: NTX

## 2019-01-01 PROCEDURE — 25000003 PHARM REV CODE 250: Mod: NTX | Performed by: NEUROLOGICAL SURGERY

## 2019-01-01 PROCEDURE — 85610 PROTHROMBIN TIME: CPT | Mod: 91,NTX

## 2019-01-01 PROCEDURE — 94640 AIRWAY INHALATION TREATMENT: CPT | Mod: NTX

## 2019-01-01 PROCEDURE — 25000003 PHARM REV CODE 250: Mod: NTX | Performed by: EMERGENCY MEDICINE

## 2019-01-01 PROCEDURE — 63600175 PHARM REV CODE 636 W HCPCS: Mod: NTX

## 2019-01-01 PROCEDURE — 82803 BLOOD GASES ANY COMBINATION: CPT | Mod: NTX

## 2019-01-01 PROCEDURE — 20000000 HC ICU ROOM: Mod: NTX

## 2019-01-01 PROCEDURE — 93041 RHYTHM ECG TRACING: CPT | Mod: NTX

## 2019-01-01 PROCEDURE — 90935 PR HEMODIALYSIS, ONE EVALUATION: ICD-10-PCS | Mod: NTX,,, | Performed by: NURSE PRACTITIONER

## 2019-01-01 PROCEDURE — 99217 PR OBSERVATION CARE DISCHARGE: CPT | Mod: NTX,,, | Performed by: HOSPITALIST

## 2019-01-01 PROCEDURE — 87040 BLOOD CULTURE FOR BACTERIA: CPT | Mod: 59,NTX

## 2019-01-01 PROCEDURE — 99215 OFFICE O/P EST HI 40 MIN: CPT | Mod: S$PBB,TXP,, | Performed by: INTERNAL MEDICINE

## 2019-01-01 PROCEDURE — 87632 RESP VIRUS 6-11 TARGETS: CPT | Mod: NTX

## 2019-01-01 PROCEDURE — 25500020 PHARM REV CODE 255: Mod: NTX | Performed by: EMERGENCY MEDICINE

## 2019-01-01 PROCEDURE — 36620 INSERTION CATHETER ARTERY: CPT | Mod: NTX,,, | Performed by: NURSE PRACTITIONER

## 2019-01-01 PROCEDURE — 99214 OFFICE O/P EST MOD 30 MIN: CPT | Mod: S$PBB,NTX,, | Performed by: NEUROLOGICAL SURGERY

## 2019-01-01 PROCEDURE — 99232 PR SUBSEQUENT HOSPITAL CARE,LEVL II: ICD-10-PCS | Mod: NTX,,, | Performed by: PHYSICAL MEDICINE & REHABILITATION

## 2019-01-01 PROCEDURE — 27200966 HC CLOSED SUCTION SYSTEM: Mod: NTX

## 2019-01-01 PROCEDURE — S5010 5% DEXTROSE AND 0.45% SALINE: HCPCS | Mod: NTX | Performed by: INTERNAL MEDICINE

## 2019-01-01 PROCEDURE — 80069 RENAL FUNCTION PANEL: CPT | Mod: NTX

## 2019-01-01 PROCEDURE — 99024 POSTOP FOLLOW-UP VISIT: CPT | Mod: NTX,POP,, | Performed by: NEUROLOGICAL SURGERY

## 2019-01-01 PROCEDURE — 99223 PR INITIAL HOSPITAL CARE,LEVL III: ICD-10-PCS | Mod: NTX,,, | Performed by: NURSE PRACTITIONER

## 2019-01-01 PROCEDURE — 99999 PR PBB SHADOW E&M-EST. PATIENT-LVL III: ICD-10-PCS | Mod: PBBFAC,,, | Performed by: NURSE PRACTITIONER

## 2019-01-01 PROCEDURE — 83970 ASSAY OF PARATHORMONE: CPT | Mod: 91,NTX

## 2019-01-01 PROCEDURE — 99232 PR SUBSEQUENT HOSPITAL CARE,LEVL II: ICD-10-PCS | Mod: NTX,,, | Performed by: NURSE PRACTITIONER

## 2019-01-01 PROCEDURE — 83735 ASSAY OF MAGNESIUM: CPT | Mod: TXP

## 2019-01-01 PROCEDURE — 99223 1ST HOSP IP/OBS HIGH 75: CPT | Mod: NTX,,, | Performed by: NURSE PRACTITIONER

## 2019-01-01 PROCEDURE — 84100 ASSAY OF PHOSPHORUS: CPT | Mod: TXP

## 2019-01-01 PROCEDURE — 88175 CYTOPATH C/V AUTO FLUID REDO: CPT | Performed by: PATHOLOGY

## 2019-01-01 PROCEDURE — 99232 SBSQ HOSP IP/OBS MODERATE 35: CPT | Mod: GC,NTX,, | Performed by: HOSPITALIST

## 2019-01-01 PROCEDURE — 82330 ASSAY OF CALCIUM: CPT | Mod: TXP

## 2019-01-01 PROCEDURE — G0179 PR HOME HEALTH MD RECERTIFICATION: ICD-10-PCS | Mod: ,,, | Performed by: INTERNAL MEDICINE

## 2019-01-01 PROCEDURE — 99024 PR POST-OP FOLLOW-UP VISIT: ICD-10-PCS | Mod: NTX,POP,, | Performed by: SURGERY

## 2019-01-01 PROCEDURE — 27201037 HC PRESSURE MONITORING SET UP: Mod: NTX

## 2019-01-01 PROCEDURE — 99238 PR HOSPITAL DISCHARGE DAY,<30 MIN: ICD-10-PCS | Mod: GC,NTX,, | Performed by: HOSPITALIST

## 2019-01-01 PROCEDURE — 99999 PR PBB SHADOW E&M-EST. PATIENT-LVL IV: ICD-10-PCS | Mod: PBBFAC,TXP,, | Performed by: INTERNAL MEDICINE

## 2019-01-01 PROCEDURE — 90935 PR HEMODIALYSIS, ONE EVALUATION: ICD-10-PCS | Mod: TXP,,, | Performed by: NURSE PRACTITIONER

## 2019-01-01 PROCEDURE — 86965 POOLING BLOOD PLATELETS: CPT | Mod: NTX

## 2019-01-01 PROCEDURE — 99999 PR PBB SHADOW E&M-EST. PATIENT-LVL III: CPT | Mod: PBBFAC,,, | Performed by: NURSE PRACTITIONER

## 2019-01-01 PROCEDURE — 27000221 HC OXYGEN, UP TO 24 HOURS: Mod: NTX

## 2019-01-01 PROCEDURE — 85014 HEMATOCRIT: CPT | Mod: NTX

## 2019-01-01 PROCEDURE — 36415 COLL VENOUS BLD VENIPUNCTURE: CPT | Mod: PO,TXP

## 2019-01-01 PROCEDURE — 99285 EMERGENCY DEPT VISIT HI MDM: CPT | Mod: NTX,,, | Performed by: EMERGENCY MEDICINE

## 2019-01-01 PROCEDURE — 99214 OFFICE O/P EST MOD 30 MIN: CPT | Mod: S$PBB,,, | Performed by: NURSE PRACTITIONER

## 2019-01-01 PROCEDURE — 99291 PR CRITICAL CARE, E/M 30-74 MINUTES: ICD-10-PCS | Mod: GC,NTX,, | Performed by: ANESTHESIOLOGY

## 2019-01-01 PROCEDURE — 84295 ASSAY OF SERUM SODIUM: CPT | Mod: NTX

## 2019-01-01 PROCEDURE — 82040 ASSAY OF SERUM ALBUMIN: CPT | Mod: NTX

## 2019-01-01 PROCEDURE — 80053 COMPREHEN METABOLIC PANEL: CPT | Mod: TXP

## 2019-01-01 PROCEDURE — 99231 PR SUBSEQUENT HOSPITAL CARE,LEVL I: ICD-10-PCS | Mod: NTX,,, | Performed by: INTERNAL MEDICINE

## 2019-01-01 PROCEDURE — G0257 UNSCHED DIALYSIS ESRD PT HOS: HCPCS | Mod: NTX

## 2019-01-01 PROCEDURE — 63600175 PHARM REV CODE 636 W HCPCS: Mod: ER,NTX | Performed by: EMERGENCY MEDICINE

## 2019-01-01 PROCEDURE — 61781 PR STEREOTACTIC COMP ASSIST PROC,CRANIAL,INTRADURAL: ICD-10-PCS | Mod: NTX,,, | Performed by: NEUROLOGICAL SURGERY

## 2019-01-01 PROCEDURE — 83970 ASSAY OF PARATHORMONE: CPT | Mod: NTX

## 2019-01-01 PROCEDURE — 82150 ASSAY OF AMYLASE: CPT | Mod: NTX

## 2019-01-01 PROCEDURE — 99999 PR PBB SHADOW E&M-EST. PATIENT-LVL IV: CPT | Mod: PBBFAC,,, | Performed by: PHYSICIAN ASSISTANT

## 2019-01-01 PROCEDURE — 99233 SBSQ HOSP IP/OBS HIGH 50: CPT | Mod: GC,NTX,, | Performed by: ANESTHESIOLOGY

## 2019-01-01 PROCEDURE — 36000707: Mod: NTX | Performed by: SURGERY

## 2019-01-01 PROCEDURE — 99495 TCM SERVICES (MODERATE COMPLEXITY): ICD-10-PCS | Mod: S$PBB,,, | Performed by: PHYSICIAN ASSISTANT

## 2019-01-01 PROCEDURE — 82962 GLUCOSE BLOOD TEST: CPT | Mod: NTX

## 2019-01-01 PROCEDURE — 80299 QUANTITATIVE ASSAY DRUG: CPT | Mod: NTX

## 2019-01-01 PROCEDURE — 99233 PR SUBSEQUENT HOSPITAL CARE,LEVL III: ICD-10-PCS | Mod: GC,NTX,, | Performed by: PSYCHIATRY & NEUROLOGY

## 2019-01-01 PROCEDURE — A4217 STERILE WATER/SALINE, 500 ML: HCPCS | Mod: NTX | Performed by: PSYCHIATRY & NEUROLOGY

## 2019-01-01 PROCEDURE — 71000033 HC RECOVERY, INTIAL HOUR: Mod: NTX | Performed by: SURGERY

## 2019-01-01 PROCEDURE — 88331 PATH CONSLTJ SURG 1 BLK 1SPC: CPT | Mod: 26,,, | Performed by: PATHOLOGY

## 2019-01-01 PROCEDURE — 99233 SBSQ HOSP IP/OBS HIGH 50: CPT | Mod: GC,NTX,, | Performed by: HOSPITALIST

## 2019-01-01 PROCEDURE — 99238 HOSP IP/OBS DSCHRG MGMT 30/<: CPT | Mod: GC,NTX,, | Performed by: HOSPITALIST

## 2019-01-01 PROCEDURE — 99232 PR SUBSEQUENT HOSPITAL CARE,LEVL II: ICD-10-PCS | Mod: GC,NTX,, | Performed by: INTERNAL MEDICINE

## 2019-01-01 PROCEDURE — 99214 PR OFFICE/OUTPT VISIT, EST, LEVL IV, 30-39 MIN: ICD-10-PCS | Mod: S$PBB,,, | Performed by: INTERNAL MEDICINE

## 2019-01-01 PROCEDURE — 99217 PR OBSERVATION CARE DISCHARGE: ICD-10-PCS | Mod: NTX,,, | Performed by: HOSPITALIST

## 2019-01-01 PROCEDURE — 76705 US ABDOMEN LIMITED: ICD-10-PCS | Mod: 26,NTX,, | Performed by: RADIOLOGY

## 2019-01-01 PROCEDURE — 99212 OFFICE O/P EST SF 10 MIN: CPT | Mod: PBBFAC,25 | Performed by: OPHTHALMOLOGY

## 2019-01-01 PROCEDURE — 27201040 HC RC 50 FILTER: Mod: NTX

## 2019-01-01 PROCEDURE — 99024 PR POST-OP FOLLOW-UP VISIT: ICD-10-PCS | Mod: NTX,POP,, | Performed by: NEUROLOGICAL SURGERY

## 2019-01-01 PROCEDURE — 96360 HYDRATION IV INFUSION INIT: CPT | Mod: NTX

## 2019-01-01 PROCEDURE — 25000003 PHARM REV CODE 250: Mod: NTX | Performed by: GENERAL PRACTICE

## 2019-01-01 PROCEDURE — 94761 N-INVAS EAR/PLS OXIMETRY MLT: CPT | Mod: TXP

## 2019-01-01 PROCEDURE — 99291 CRITICAL CARE FIRST HOUR: CPT | Mod: NTX,,, | Performed by: EMERGENCY MEDICINE

## 2019-01-01 PROCEDURE — 99214 OFFICE O/P EST MOD 30 MIN: CPT | Mod: S$PBB,TXP,, | Performed by: INTERNAL MEDICINE

## 2019-01-01 PROCEDURE — 99213 OFFICE O/P EST LOW 20 MIN: CPT | Mod: PBBFAC | Performed by: INTERNAL MEDICINE

## 2019-01-01 PROCEDURE — 95951 PR EEG MONITORING/VIDEORECORD: ICD-10-PCS | Mod: 26,NTX,, | Performed by: PSYCHIATRY & NEUROLOGY

## 2019-01-01 PROCEDURE — 92012 INTRM OPH EXAM EST PATIENT: CPT | Mod: S$PBB,,, | Performed by: OPHTHALMOLOGY

## 2019-01-01 PROCEDURE — 76705 IR PARACENTESIS WITH IMAGING: ICD-10-PCS | Mod: 26,NTX,, | Performed by: RADIOLOGY

## 2019-01-01 PROCEDURE — D9220A PRA ANESTHESIA: Mod: ANES,NTX,, | Performed by: ANESTHESIOLOGY

## 2019-01-01 PROCEDURE — 99284 EMERGENCY DEPT VISIT MOD MDM: CPT | Mod: NTX,,, | Performed by: EMERGENCY MEDICINE

## 2019-01-01 PROCEDURE — 99291 PR CRITICAL CARE, E/M 30-74 MINUTES: ICD-10-PCS | Mod: NTX,,, | Performed by: EMERGENCY MEDICINE

## 2019-01-01 PROCEDURE — 83540 ASSAY OF IRON: CPT | Mod: NTX

## 2019-01-01 PROCEDURE — 71000033 HC RECOVERY, INTIAL HOUR: Mod: NTX | Performed by: NEUROLOGICAL SURGERY

## 2019-01-01 PROCEDURE — 85025 COMPLETE CBC W/AUTO DIFF WBC: CPT | Mod: 91,NTX

## 2019-01-01 PROCEDURE — 99222 PR INITIAL HOSPITAL CARE,LEVL II: ICD-10-PCS | Mod: NTX,,, | Performed by: NURSE PRACTITIONER

## 2019-01-01 PROCEDURE — 84484 ASSAY OF TROPONIN QUANT: CPT | Mod: 91,NTX

## 2019-01-01 PROCEDURE — 87040 BLOOD CULTURE FOR BACTERIA: CPT | Mod: NTX

## 2019-01-01 PROCEDURE — 99238 HOSP IP/OBS DSCHRG MGMT 30/<: CPT | Mod: NTX,,, | Performed by: HOSPITALIST

## 2019-01-01 PROCEDURE — 85007 BL SMEAR W/DIFF WBC COUNT: CPT | Mod: NTX

## 2019-01-01 PROCEDURE — 25000242 PHARM REV CODE 250 ALT 637 W/ HCPCS: Mod: NTX | Performed by: PHYSICIAN ASSISTANT

## 2019-01-01 PROCEDURE — 99215 PR OFFICE/OUTPT VISIT, EST, LEVL V, 40-54 MIN: ICD-10-PCS | Mod: S$PBB,TXP,, | Performed by: INTERNAL MEDICINE

## 2019-01-01 PROCEDURE — 99214 OFFICE O/P EST MOD 30 MIN: CPT | Mod: NTX,,, | Performed by: INTERNAL MEDICINE

## 2019-01-01 PROCEDURE — 82306 VITAMIN D 25 HYDROXY: CPT | Mod: NTX

## 2019-01-01 PROCEDURE — 82977 ASSAY OF GGT: CPT | Mod: NTX

## 2019-01-01 PROCEDURE — 25000003 PHARM REV CODE 250: Mod: TXP

## 2019-01-01 PROCEDURE — 36000706: Mod: NTX | Performed by: SURGERY

## 2019-01-01 PROCEDURE — 27201037 HC PRESSURE MONITORING SET UP: Mod: TXP

## 2019-01-01 PROCEDURE — 90935 HEMODIALYSIS ONE EVALUATION: CPT | Mod: NTX,,, | Performed by: INTERNAL MEDICINE

## 2019-01-01 PROCEDURE — 99214 OFFICE O/P EST MOD 30 MIN: CPT | Mod: PBBFAC,TXP | Performed by: SURGERY

## 2019-01-01 PROCEDURE — 86901 BLOOD TYPING SEROLOGIC RH(D): CPT | Mod: TXP

## 2019-01-01 PROCEDURE — 99284 EMERGENCY DEPT VISIT MOD MDM: CPT | Mod: 25,NTX

## 2019-01-01 PROCEDURE — 25000242 PHARM REV CODE 250 ALT 637 W/ HCPCS: Mod: NTX | Performed by: STUDENT IN AN ORGANIZED HEALTH CARE EDUCATION/TRAINING PROGRAM

## 2019-01-01 PROCEDURE — 63600175 PHARM REV CODE 636 W HCPCS: Mod: TXP | Performed by: SURGERY

## 2019-01-01 PROCEDURE — 76705 ECHO EXAM OF ABDOMEN: CPT | Mod: TC,TXP

## 2019-01-01 PROCEDURE — 63600175 PHARM REV CODE 636 W HCPCS: Mod: NTX | Performed by: PSYCHIATRY & NEUROLOGY

## 2019-01-01 PROCEDURE — 99999 PR PBB SHADOW E&M-EST. PATIENT-LVL II: CPT | Mod: PBBFAC,,, | Performed by: OPHTHALMOLOGY

## 2019-01-01 PROCEDURE — 92012 PR EYE EXAM, EST PATIENT,INTERMED: ICD-10-PCS | Mod: S$PBB,,, | Performed by: OPHTHALMOLOGY

## 2019-01-01 PROCEDURE — 99024 PR POST-OP FOLLOW-UP VISIT: ICD-10-PCS | Mod: POP,NTX,, | Performed by: PHYSICIAN ASSISTANT

## 2019-01-01 PROCEDURE — G0179 MD RECERTIFICATION HHA PT: HCPCS | Mod: ,,, | Performed by: INTERNAL MEDICINE

## 2019-01-01 PROCEDURE — 99291 PR CRITICAL CARE, E/M 30-74 MINUTES: ICD-10-PCS | Mod: ,,, | Performed by: EMERGENCY MEDICINE

## 2019-01-01 PROCEDURE — 83615 LACTATE (LD) (LDH) ENZYME: CPT | Mod: NTX

## 2019-01-01 PROCEDURE — 27201423 OPTIME MED/SURG SUP & DEVICES STERILE SUPPLY: Mod: NTX | Performed by: NEUROLOGICAL SURGERY

## 2019-01-01 PROCEDURE — 86022 PLATELET ANTIBODIES: CPT | Mod: NTX

## 2019-01-01 PROCEDURE — 84484 ASSAY OF TROPONIN QUANT: CPT | Mod: NTX

## 2019-01-01 PROCEDURE — 25000003 PHARM REV CODE 250: Mod: NTX

## 2019-01-01 PROCEDURE — 94002 VENT MGMT INPAT INIT DAY: CPT | Mod: NTX

## 2019-01-01 PROCEDURE — 93010 ELECTROCARDIOGRAM REPORT: CPT | Mod: 77,NTX,, | Performed by: INTERNAL MEDICINE

## 2019-01-01 PROCEDURE — 84443 ASSAY THYROID STIM HORMONE: CPT | Mod: NTX

## 2019-01-01 PROCEDURE — 99213 OFFICE O/P EST LOW 20 MIN: CPT | Mod: PBBFAC,TXP | Performed by: INTERNAL MEDICINE

## 2019-01-01 PROCEDURE — 85045 AUTOMATED RETICULOCYTE COUNT: CPT | Mod: TXP

## 2019-01-01 PROCEDURE — 85002 BLEEDING TIME TEST: CPT | Mod: NTX

## 2019-01-01 PROCEDURE — 93010 EKG 12-LEAD: ICD-10-PCS | Mod: 77,NTX,, | Performed by: INTERNAL MEDICINE

## 2019-01-01 PROCEDURE — 88307 TISSUE SPECIMEN TO PATHOLOGY - SURGERY: ICD-10-PCS | Mod: 26,NTX,, | Performed by: PATHOLOGY

## 2019-01-01 PROCEDURE — 78072 PARATHYRD PLANAR W/SPECT&CT: CPT | Mod: 26,NTX,, | Performed by: RADIOLOGY

## 2019-01-01 PROCEDURE — 80053 COMPREHEN METABOLIC PANEL: CPT | Mod: 91,NTX

## 2019-01-01 PROCEDURE — 37000008 HC ANESTHESIA 1ST 15 MINUTES: Mod: NTX | Performed by: NEUROLOGICAL SURGERY

## 2019-01-01 PROCEDURE — 43752 NASAL/OROGASTRIC W/TUBE PLMT: CPT | Mod: NTX

## 2019-01-01 PROCEDURE — 99217 PR OBSERVATION CARE DISCHARGE: ICD-10-PCS | Mod: NTX,,, | Performed by: INTERNAL MEDICINE

## 2019-01-01 PROCEDURE — 36430 TRANSFUSION BLD/BLD COMPNT: CPT | Mod: NTX

## 2019-01-01 PROCEDURE — 83735 ASSAY OF MAGNESIUM: CPT | Mod: 91,NTX

## 2019-01-01 PROCEDURE — 84145 PROCALCITONIN (PCT): CPT | Mod: NTX

## 2019-01-01 PROCEDURE — 99999 PR PBB SHADOW E&M-EST. PATIENT-LVL III: ICD-10-PCS | Mod: PBBFAC,TXP,, | Performed by: SURGERY

## 2019-01-01 PROCEDURE — 60500 PR EXPLORE PARATHYROID GLANDS: ICD-10-PCS | Mod: GC,NTX,, | Performed by: SURGERY

## 2019-01-01 PROCEDURE — 99999 PR PBB SHADOW E&M-EST. PATIENT-LVL III: ICD-10-PCS | Mod: PBBFAC,TXP,, | Performed by: INTERNAL MEDICINE

## 2019-01-01 PROCEDURE — 61781 SCAN PROC CRANIAL INTRA: CPT | Mod: NTX,,, | Performed by: NEUROLOGICAL SURGERY

## 2019-01-01 PROCEDURE — 82728 ASSAY OF FERRITIN: CPT | Mod: NTX

## 2019-01-01 PROCEDURE — 61312 CRNEC/CRNOT STTL XDRL/SDRL: CPT | Mod: 78,XE,GC,NTX | Performed by: NEUROLOGICAL SURGERY

## 2019-01-01 PROCEDURE — D9220A PRA ANESTHESIA: ICD-10-PCS | Mod: ANES,NTX,, | Performed by: ANESTHESIOLOGY

## 2019-01-01 PROCEDURE — 71000039 HC RECOVERY, EACH ADD'L HOUR: Mod: NTX | Performed by: SURGERY

## 2019-01-01 PROCEDURE — 78072 NM PARATHYROID SCAN WITH SPECT AND CT: ICD-10-PCS | Mod: 26,NTX,, | Performed by: RADIOLOGY

## 2019-01-01 PROCEDURE — 88305 TISSUE EXAM BY PATHOLOGIST: CPT | Mod: NTX | Performed by: PATHOLOGY

## 2019-01-01 PROCEDURE — 99495 TRANSJ CARE MGMT MOD F2F 14D: CPT | Mod: S$PBB,,, | Performed by: PHYSICIAN ASSISTANT

## 2019-01-01 PROCEDURE — 97166 OT EVAL MOD COMPLEX 45 MIN: CPT | Mod: NTX

## 2019-01-01 PROCEDURE — 63600175 PHARM REV CODE 636 W HCPCS: Mod: TXP | Performed by: ANESTHESIOLOGY

## 2019-01-01 PROCEDURE — 99292 PR CRITICAL CARE, ADDL 30 MIN: ICD-10-PCS | Mod: NTX,,, | Performed by: PSYCHIATRY & NEUROLOGY

## 2019-01-01 PROCEDURE — 99213 OFFICE O/P EST LOW 20 MIN: CPT | Mod: PBBFAC,TXP | Performed by: NEUROLOGICAL SURGERY

## 2019-01-01 PROCEDURE — 25000003 PHARM REV CODE 250: Mod: NTX | Performed by: ANESTHESIOLOGY

## 2019-01-01 PROCEDURE — 99220 PR INITIAL OBSERVATION CARE,LEVL III: CPT | Mod: NTX,,, | Performed by: PHYSICIAN ASSISTANT

## 2019-01-01 PROCEDURE — 88141 LIQUID-BASED PAP SMEAR, SCREENING: ICD-10-PCS | Mod: ,,, | Performed by: PATHOLOGY

## 2019-01-01 PROCEDURE — 92083 EXTENDED VISUAL FIELD XM: CPT | Mod: PBBFAC | Performed by: OPHTHALMOLOGY

## 2019-01-01 PROCEDURE — 80197 ASSAY OF TACROLIMUS: CPT | Mod: 91,NTX

## 2019-01-01 PROCEDURE — 99220 PR INITIAL OBSERVATION CARE,LEVL III: CPT | Mod: NTX,,, | Performed by: INTERNAL MEDICINE

## 2019-01-01 PROCEDURE — 92610 EVALUATE SWALLOWING FUNCTION: CPT | Mod: NTX

## 2019-01-01 PROCEDURE — 97116 GAIT TRAINING THERAPY: CPT | Mod: NTX

## 2019-01-01 PROCEDURE — 88331 TISSUE SPECIMEN TO PATHOLOGY - SURGERY: ICD-10-PCS | Mod: 26,,, | Performed by: PATHOLOGY

## 2019-01-01 PROCEDURE — A7048 VACUUM DRAIN BOTTLE/TUBE KIT: HCPCS | Mod: NTX

## 2019-01-01 PROCEDURE — 99232 PR SUBSEQUENT HOSPITAL CARE,LEVL II: ICD-10-PCS | Mod: GC,NTX,, | Performed by: HOSPITALIST

## 2019-01-01 PROCEDURE — 99223 1ST HOSP IP/OBS HIGH 75: CPT | Mod: 25,NTX,, | Performed by: NURSE PRACTITIONER

## 2019-01-01 PROCEDURE — 99999 PR PBB SHADOW E&M-EST. PATIENT-LVL II: ICD-10-PCS | Mod: PBBFAC,,, | Performed by: OBSTETRICS & GYNECOLOGY

## 2019-01-01 PROCEDURE — 99214 OFFICE O/P EST MOD 30 MIN: CPT | Mod: NTX,,, | Performed by: NURSE PRACTITIONER

## 2019-01-01 PROCEDURE — P9012 CRYOPRECIPITATE EACH UNIT: HCPCS | Mod: NTX

## 2019-01-01 PROCEDURE — 97162 PT EVAL MOD COMPLEX 30 MIN: CPT | Mod: NTX

## 2019-01-01 PROCEDURE — 96372 THER/PROPH/DIAG INJ SC/IM: CPT | Mod: ER,NTX

## 2019-01-01 PROCEDURE — 27201423 OPTIME MED/SURG SUP & DEVICES STERILE SUPPLY: Mod: NTX | Performed by: SURGERY

## 2019-01-01 PROCEDURE — 88141 CYTOPATH C/V INTERPRET: CPT | Mod: ,,, | Performed by: PATHOLOGY

## 2019-01-01 PROCEDURE — 99024 POSTOP FOLLOW-UP VISIT: CPT | Mod: NTX,POP,, | Performed by: SURGERY

## 2019-01-01 PROCEDURE — 86850 RBC ANTIBODY SCREEN: CPT | Mod: TXP

## 2019-01-01 PROCEDURE — 87624 HPV HI-RISK TYP POOLED RSLT: CPT

## 2019-01-01 PROCEDURE — 63600175 PHARM REV CODE 636 W HCPCS: Mod: NTX | Performed by: ANESTHESIOLOGY

## 2019-01-01 PROCEDURE — 85730 THROMBOPLASTIN TIME PARTIAL: CPT | Mod: 91,NTX

## 2019-01-01 PROCEDURE — 37000009 HC ANESTHESIA EA ADD 15 MINS: Mod: NTX | Performed by: NEUROLOGICAL SURGERY

## 2019-01-01 PROCEDURE — 71000039 HC RECOVERY, EACH ADD'L HOUR: Mod: NTX | Performed by: NEUROLOGICAL SURGERY

## 2019-01-01 PROCEDURE — C1729 CATH, DRAINAGE: HCPCS | Mod: NTX | Performed by: NEUROLOGICAL SURGERY

## 2019-01-01 PROCEDURE — 99999 PR PBB SHADOW E&M-EST. PATIENT-LVL II: CPT | Mod: PBBFAC,,, | Performed by: OBSTETRICS & GYNECOLOGY

## 2019-01-01 PROCEDURE — 99214 PR OFFICE/OUTPT VISIT, EST, LEVL IV, 30-39 MIN: ICD-10-PCS | Mod: NTX,,, | Performed by: INTERNAL MEDICINE

## 2019-01-01 PROCEDURE — 88307 TISSUE EXAM BY PATHOLOGIST: CPT | Mod: NTX | Performed by: PATHOLOGY

## 2019-01-01 PROCEDURE — 99291 CRITICAL CARE FIRST HOUR: CPT | Mod: ,,, | Performed by: EMERGENCY MEDICINE

## 2019-01-01 PROCEDURE — 61312 PR OPEN SKULL EVAC HEMATOMA, SUPRATENTORIAL, SUB/ EXTRADURAL: ICD-10-PCS | Mod: 78,XE,GC,NTX | Performed by: NEUROLOGICAL SURGERY

## 2019-01-01 PROCEDURE — 99222 1ST HOSP IP/OBS MODERATE 55: CPT | Mod: NTX,,, | Performed by: NURSE PRACTITIONER

## 2019-01-01 PROCEDURE — 78072 PARATHYRD PLANAR W/SPECT&CT: CPT | Mod: TC,TXP

## 2019-01-01 PROCEDURE — 37000008 HC ANESTHESIA 1ST 15 MINUTES: Mod: NTX | Performed by: SURGERY

## 2019-01-01 PROCEDURE — 88307 TISSUE EXAM BY PATHOLOGIST: CPT | Mod: 26,NTX,, | Performed by: PATHOLOGY

## 2019-01-01 PROCEDURE — 95951 HC EEG MONITORING/VIDEO RECORD: CPT | Mod: NTX

## 2019-01-01 PROCEDURE — 99283 EMERGENCY DEPT VISIT LOW MDM: CPT | Mod: ER,NTX

## 2019-01-01 PROCEDURE — 37000009 HC ANESTHESIA EA ADD 15 MINS: Mod: NTX | Performed by: SURGERY

## 2019-01-01 PROCEDURE — 99220 PR INITIAL OBSERVATION CARE,LEVL III: ICD-10-PCS | Mod: NTX,,, | Performed by: PHYSICIAN ASSISTANT

## 2019-01-01 PROCEDURE — 94150 VITAL CAPACITY TEST: CPT | Mod: NTX

## 2019-01-01 PROCEDURE — 84295 ASSAY OF SERUM SODIUM: CPT | Mod: 91,NTX

## 2019-01-01 PROCEDURE — 99214 PR OFFICE/OUTPT VISIT, EST, LEVL IV, 30-39 MIN: ICD-10-PCS | Mod: NTX,,, | Performed by: NURSE PRACTITIONER

## 2019-01-01 PROCEDURE — 99213 OFFICE O/P EST LOW 20 MIN: CPT | Mod: PBBFAC,NTX | Performed by: INTERNAL MEDICINE

## 2019-01-01 PROCEDURE — 99213 OFFICE O/P EST LOW 20 MIN: CPT | Mod: PBBFAC,TXP | Performed by: SURGERY

## 2019-01-01 PROCEDURE — 90935 HEMODIALYSIS ONE EVALUATION: CPT | Mod: TXP,,, | Performed by: NURSE PRACTITIONER

## 2019-01-01 PROCEDURE — 70450 CT HEAD/BRAIN W/O DYE: CPT | Mod: TC,NTX

## 2019-01-01 DEVICE — IMPLANTABLE DEVICE: Type: IMPLANTABLE DEVICE | Site: CRANIAL | Status: FUNCTIONAL

## 2019-01-01 DEVICE — SCREW SD 1.5X4MM TI CROSS PIN: Type: IMPLANTABLE DEVICE | Site: CRANIAL | Status: FUNCTIONAL

## 2019-01-01 RX ORDER — IRBESARTAN 150 MG/1
300 TABLET ORAL DAILY
Status: DISCONTINUED | OUTPATIENT
Start: 2019-01-01 | End: 2019-01-01 | Stop reason: HOSPADM

## 2019-01-01 RX ORDER — HYDRALAZINE HYDROCHLORIDE 50 MG/1
100 TABLET, FILM COATED ORAL EVERY 8 HOURS
Status: CANCELLED | OUTPATIENT
Start: 2019-01-01

## 2019-01-01 RX ORDER — VANCOMYCIN HYDROCHLORIDE 500 MG/10ML
INJECTION, POWDER, LYOPHILIZED, FOR SOLUTION INTRAVENOUS
Status: DISCONTINUED | OUTPATIENT
Start: 2019-01-01 | End: 2019-01-01 | Stop reason: HOSPADM

## 2019-01-01 RX ORDER — LEVETIRACETAM 500 MG/1
TABLET ORAL
Status: COMPLETED
Start: 2019-01-01 | End: 2019-01-01

## 2019-01-01 RX ORDER — ONDANSETRON 2 MG/ML
4 INJECTION INTRAMUSCULAR; INTRAVENOUS EVERY 8 HOURS PRN
Status: DISCONTINUED | OUTPATIENT
Start: 2019-01-01 | End: 2019-01-01 | Stop reason: HOSPADM

## 2019-01-01 RX ORDER — LACOSAMIDE 50 MG/1
50 TABLET ORAL 2 TIMES DAILY
Status: CANCELLED | OUTPATIENT
Start: 2019-01-01

## 2019-01-01 RX ORDER — HYDRALAZINE HYDROCHLORIDE 20 MG/ML
10 INJECTION INTRAMUSCULAR; INTRAVENOUS EVERY 8 HOURS PRN
Status: DISCONTINUED | OUTPATIENT
Start: 2019-01-01 | End: 2019-01-01 | Stop reason: HOSPADM

## 2019-01-01 RX ORDER — FAMOTIDINE 20 MG/1
40 TABLET, FILM COATED ORAL EVERY MORNING
Status: DISCONTINUED | OUTPATIENT
Start: 2019-01-01 | End: 2019-01-01 | Stop reason: HOSPADM

## 2019-01-01 RX ORDER — VERAPAMIL HYDROCHLORIDE 240 MG/1
240 TABLET, FILM COATED, EXTENDED RELEASE ORAL
Status: COMPLETED | OUTPATIENT
Start: 2019-01-01 | End: 2019-01-01

## 2019-01-01 RX ORDER — LACOSAMIDE 50 MG/1
TABLET ORAL
Status: COMPLETED
Start: 2019-01-01 | End: 2019-01-01

## 2019-01-01 RX ORDER — PROPOFOL 10 MG/ML
VIAL (ML) INTRAVENOUS
Status: DISCONTINUED | OUTPATIENT
Start: 2019-01-01 | End: 2019-01-01

## 2019-01-01 RX ORDER — CLONIDINE HYDROCHLORIDE 0.3 MG/1
0.3 TABLET ORAL
Status: COMPLETED | OUTPATIENT
Start: 2019-01-01 | End: 2019-01-01

## 2019-01-01 RX ORDER — LACOSAMIDE 50 MG/1
50 TABLET ORAL 2 TIMES DAILY
Status: DISCONTINUED | OUTPATIENT
Start: 2019-01-01 | End: 2019-01-01 | Stop reason: HOSPADM

## 2019-01-01 RX ORDER — KETAMINE HCL IN 0.9 % NACL 50 MG/5 ML
SYRINGE (ML) INTRAVENOUS
Status: DISCONTINUED | OUTPATIENT
Start: 2019-01-01 | End: 2019-01-01

## 2019-01-01 RX ORDER — SODIUM CHLORIDE 9 MG/ML
INJECTION, SOLUTION INTRAVENOUS CONTINUOUS PRN
Status: DISCONTINUED | OUTPATIENT
Start: 2019-01-01 | End: 2019-01-01

## 2019-01-01 RX ORDER — TACROLIMUS 5 MG/1
5 CAPSULE ORAL EVERY MORNING
Status: DISCONTINUED | OUTPATIENT
Start: 2019-01-01 | End: 2019-01-01

## 2019-01-01 RX ORDER — ACETAMINOPHEN AND CODEINE PHOSPHATE 300; 30 MG/1; MG/1
1 TABLET ORAL EVERY 8 HOURS PRN
Status: DISCONTINUED | OUTPATIENT
Start: 2019-01-01 | End: 2019-01-01 | Stop reason: HOSPADM

## 2019-01-01 RX ORDER — IRBESARTAN 150 MG/1
300 TABLET ORAL EVERY MORNING
Status: DISCONTINUED | OUTPATIENT
Start: 2019-01-01 | End: 2019-01-01 | Stop reason: HOSPADM

## 2019-01-01 RX ORDER — HYDROCODONE BITARTRATE AND ACETAMINOPHEN 500; 5 MG/1; MG/1
TABLET ORAL
Status: DISCONTINUED | OUTPATIENT
Start: 2019-01-01 | End: 2019-01-01

## 2019-01-01 RX ORDER — ROCURONIUM BROMIDE 10 MG/ML
INJECTION, SOLUTION INTRAVENOUS
Status: DISCONTINUED | OUTPATIENT
Start: 2019-01-01 | End: 2019-01-01

## 2019-01-01 RX ORDER — ACETAMINOPHEN 500 MG
1000 TABLET ORAL
Status: COMPLETED | OUTPATIENT
Start: 2019-01-01 | End: 2019-01-01

## 2019-01-01 RX ORDER — MIDAZOLAM HYDROCHLORIDE 1 MG/ML
INJECTION, SOLUTION INTRAMUSCULAR; INTRAVENOUS
Status: DISCONTINUED | OUTPATIENT
Start: 2019-01-01 | End: 2019-01-01

## 2019-01-01 RX ORDER — SODIUM CHLORIDE 0.9 % (FLUSH) 0.9 %
10 SYRINGE (ML) INJECTION
Status: DISCONTINUED | OUTPATIENT
Start: 2019-01-01 | End: 2019-01-01 | Stop reason: HOSPADM

## 2019-01-01 RX ORDER — ALBUTEROL SULFATE 2.5 MG/.5ML
10 SOLUTION RESPIRATORY (INHALATION) ONCE
Status: COMPLETED | OUTPATIENT
Start: 2019-01-01 | End: 2019-01-01

## 2019-01-01 RX ORDER — BACITRACIN ZINC 500 UNIT/G
OINTMENT (GRAM) TOPICAL
Status: DISCONTINUED | OUTPATIENT
Start: 2019-01-01 | End: 2019-01-01 | Stop reason: HOSPADM

## 2019-01-01 RX ORDER — HYDROCODONE BITARTRATE AND ACETAMINOPHEN 500; 5 MG/1; MG/1
TABLET ORAL
Status: DISCONTINUED | OUTPATIENT
Start: 2019-01-01 | End: 2019-01-01 | Stop reason: HOSPADM

## 2019-01-01 RX ORDER — ACETAMINOPHEN 325 MG/1
650 TABLET ORAL
Status: COMPLETED | OUTPATIENT
Start: 2019-01-01 | End: 2019-01-01

## 2019-01-01 RX ORDER — TACROLIMUS 5 MG/1
5 CAPSULE ORAL EVERY 12 HOURS
Qty: 60 CAPSULE | Refills: 11 | Status: SHIPPED | OUTPATIENT
Start: 2019-01-01 | End: 2019-01-01 | Stop reason: SDUPTHER

## 2019-01-01 RX ORDER — FAMOTIDINE 20 MG/1
TABLET, FILM COATED ORAL
Status: DISPENSED
Start: 2019-01-01 | End: 2019-01-01

## 2019-01-01 RX ORDER — BACITRACIN 500 [USP'U]/G
OINTMENT TOPICAL
Refills: 0 | COMMUNITY
Start: 2019-01-01

## 2019-01-01 RX ORDER — SODIUM CHLORIDE 0.9 % (FLUSH) 0.9 %
5 SYRINGE (ML) INJECTION
Status: CANCELLED | OUTPATIENT
Start: 2019-01-01

## 2019-01-01 RX ORDER — CEFAZOLIN SODIUM 1 G/3ML
2 INJECTION, POWDER, FOR SOLUTION INTRAMUSCULAR; INTRAVENOUS
Status: DISCONTINUED | OUTPATIENT
Start: 2019-01-01 | End: 2019-01-01 | Stop reason: HOSPADM

## 2019-01-01 RX ORDER — GLUCAGON 1 MG
1 KIT INJECTION
Status: DISCONTINUED | OUTPATIENT
Start: 2019-01-01 | End: 2019-01-01 | Stop reason: HOSPADM

## 2019-01-01 RX ORDER — SODIUM CHLORIDE 9 MG/ML
INJECTION, SOLUTION INTRAVENOUS ONCE
Status: COMPLETED | OUTPATIENT
Start: 2019-01-01 | End: 2019-01-01

## 2019-01-01 RX ORDER — GLYCOPYRROLATE 0.2 MG/ML
INJECTION INTRAMUSCULAR; INTRAVENOUS
Status: DISCONTINUED | OUTPATIENT
Start: 2019-01-01 | End: 2019-01-01

## 2019-01-01 RX ORDER — LEVETIRACETAM 500 MG/1
500 TABLET ORAL 2 TIMES DAILY
Status: DISCONTINUED | OUTPATIENT
Start: 2019-01-01 | End: 2019-01-01

## 2019-01-01 RX ORDER — HYDRALAZINE HYDROCHLORIDE 20 MG/ML
10 INJECTION INTRAMUSCULAR; INTRAVENOUS EVERY 8 HOURS PRN
Status: DISCONTINUED | OUTPATIENT
Start: 2019-01-01 | End: 2019-01-01

## 2019-01-01 RX ORDER — POLYETHYLENE GLYCOL 3350 17 G/17G
17 POWDER, FOR SOLUTION ORAL 2 TIMES DAILY
Qty: 60 PACKET | Refills: 3 | Status: SHIPPED | OUTPATIENT
Start: 2019-01-01

## 2019-01-01 RX ORDER — AMOXICILLIN 250 MG
CAPSULE ORAL
Status: COMPLETED
Start: 2019-01-01 | End: 2019-01-01

## 2019-01-01 RX ORDER — CARVEDILOL 25 MG/1
25 TABLET ORAL 2 TIMES DAILY
Status: DISCONTINUED | OUTPATIENT
Start: 2019-01-01 | End: 2019-01-01 | Stop reason: HOSPADM

## 2019-01-01 RX ORDER — CALCIUM CARBONATE 1250 MG/5ML
2000 SUSPENSION ORAL 3 TIMES DAILY
Status: DISCONTINUED | OUTPATIENT
Start: 2019-01-01 | End: 2019-01-01

## 2019-01-01 RX ORDER — PANTOPRAZOLE SODIUM 40 MG/1
40 TABLET, DELAYED RELEASE ORAL DAILY
Qty: 30 TABLET | Refills: 11 | Status: SHIPPED | OUTPATIENT
Start: 2019-01-01 | End: 2020-05-21

## 2019-01-01 RX ORDER — SODIUM CHLORIDE 0.9 % (FLUSH) 0.9 %
5 SYRINGE (ML) INJECTION
Status: DISCONTINUED | OUTPATIENT
Start: 2019-01-01 | End: 2019-01-01 | Stop reason: HOSPADM

## 2019-01-01 RX ORDER — DEXAMETHASONE 4 MG/1
40 TABLET ORAL DAILY
Status: DISPENSED | OUTPATIENT
Start: 2019-01-01 | End: 2019-01-01

## 2019-01-01 RX ORDER — LACOSAMIDE 50 MG/1
50 TABLET ORAL
COMMUNITY
End: 2019-01-01 | Stop reason: SDUPTHER

## 2019-01-01 RX ORDER — NICARDIPINE HYDROCHLORIDE 0.2 MG/ML
1 INJECTION INTRAVENOUS CONTINUOUS
Status: DISCONTINUED | OUTPATIENT
Start: 2019-01-01 | End: 2019-01-01

## 2019-01-01 RX ORDER — METOCLOPRAMIDE HYDROCHLORIDE 5 MG/ML
10 INJECTION INTRAMUSCULAR; INTRAVENOUS
Status: DISCONTINUED | OUTPATIENT
Start: 2019-01-01 | End: 2019-01-01 | Stop reason: SDUPTHER

## 2019-01-01 RX ORDER — MANNITOL 20 G/100ML
50 INJECTION, SOLUTION INTRAVENOUS ONCE
Status: DISCONTINUED | OUTPATIENT
Start: 2019-01-01 | End: 2019-01-01

## 2019-01-01 RX ORDER — ACETAMINOPHEN 500 MG
1000 TABLET ORAL EVERY 8 HOURS
Status: DISCONTINUED | OUTPATIENT
Start: 2019-01-01 | End: 2019-01-01

## 2019-01-01 RX ORDER — HYDRALAZINE HYDROCHLORIDE 25 MG/1
25 TABLET, FILM COATED ORAL EVERY 8 HOURS PRN
Status: DISCONTINUED | OUTPATIENT
Start: 2019-01-01 | End: 2019-01-01

## 2019-01-01 RX ORDER — CARVEDILOL 25 MG/1
25 TABLET ORAL 2 TIMES DAILY
Status: DISCONTINUED | OUTPATIENT
Start: 2019-01-01 | End: 2019-01-01

## 2019-01-01 RX ORDER — CLONIDINE HYDROCHLORIDE 0.2 MG/1
0.2 TABLET ORAL 2 TIMES DAILY
Status: DISCONTINUED | OUTPATIENT
Start: 2019-01-01 | End: 2019-01-01

## 2019-01-01 RX ORDER — LEVETIRACETAM 500 MG/1
500 TABLET ORAL 2 TIMES DAILY
Status: DISCONTINUED | OUTPATIENT
Start: 2019-01-01 | End: 2019-01-01 | Stop reason: HOSPADM

## 2019-01-01 RX ORDER — CARVEDILOL 25 MG/1
25 TABLET ORAL 2 TIMES DAILY
Status: CANCELLED | OUTPATIENT
Start: 2019-01-01

## 2019-01-01 RX ORDER — CARVEDILOL 25 MG/1
25 TABLET ORAL 2 TIMES DAILY
Qty: 60 TABLET | Refills: 11 | Status: SHIPPED | OUTPATIENT
Start: 2019-01-01 | End: 2019-01-01 | Stop reason: SDUPTHER

## 2019-01-01 RX ORDER — FENTANYL CITRATE 50 UG/ML
INJECTION, SOLUTION INTRAMUSCULAR; INTRAVENOUS
Status: DISCONTINUED | OUTPATIENT
Start: 2019-01-01 | End: 2019-01-01

## 2019-01-01 RX ORDER — HYDRALAZINE HYDROCHLORIDE 20 MG/ML
5 INJECTION INTRAMUSCULAR; INTRAVENOUS ONCE
Status: COMPLETED | OUTPATIENT
Start: 2019-01-01 | End: 2019-01-01

## 2019-01-01 RX ORDER — POLYETHYLENE GLYCOL 3350 17 G/17G
17 POWDER, FOR SOLUTION ORAL 2 TIMES DAILY
Status: DISCONTINUED | OUTPATIENT
Start: 2019-01-01 | End: 2019-01-01 | Stop reason: HOSPADM

## 2019-01-01 RX ORDER — RAMELTEON 8 MG/1
8 TABLET ORAL NIGHTLY PRN
Status: DISCONTINUED | OUTPATIENT
Start: 2019-01-01 | End: 2019-01-01 | Stop reason: HOSPADM

## 2019-01-01 RX ORDER — POLYETHYLENE GLYCOL 3350 17 G/17G
17 POWDER, FOR SOLUTION ORAL 2 TIMES DAILY
Status: CANCELLED | OUTPATIENT
Start: 2019-01-01

## 2019-01-01 RX ORDER — SODIUM CHLORIDE 9 MG/ML
INJECTION, SOLUTION INTRAVENOUS ONCE
Status: DISCONTINUED | OUTPATIENT
Start: 2019-01-01 | End: 2019-01-01 | Stop reason: HOSPADM

## 2019-01-01 RX ORDER — LABETALOL HCL 20 MG/4 ML
10 SYRINGE (ML) INTRAVENOUS EVERY 4 HOURS PRN
Status: DISCONTINUED | OUTPATIENT
Start: 2019-01-01 | End: 2019-01-01 | Stop reason: HOSPADM

## 2019-01-01 RX ORDER — PROCHLORPERAZINE EDISYLATE 5 MG/ML
5 INJECTION INTRAMUSCULAR; INTRAVENOUS EVERY 6 HOURS PRN
Status: DISCONTINUED | OUTPATIENT
Start: 2019-01-01 | End: 2019-01-01 | Stop reason: HOSPADM

## 2019-01-01 RX ORDER — BUPIVACAINE HYDROCHLORIDE 5 MG/ML
INJECTION, SOLUTION EPIDURAL; INTRACAUDAL
Status: DISCONTINUED | OUTPATIENT
Start: 2019-01-01 | End: 2019-01-01 | Stop reason: HOSPADM

## 2019-01-01 RX ORDER — HYDRALAZINE HYDROCHLORIDE 25 MG/1
100 TABLET, FILM COATED ORAL EVERY 8 HOURS
Status: DISCONTINUED | OUTPATIENT
Start: 2019-01-01 | End: 2019-01-01

## 2019-01-01 RX ORDER — ERGOCALCIFEROL 1.25 MG/1
50000 CAPSULE ORAL
Qty: 3 CAPSULE | Refills: 1 | Status: ON HOLD | OUTPATIENT
Start: 2019-01-01 | End: 2019-01-01 | Stop reason: HOSPADM

## 2019-01-01 RX ORDER — CINACALCET 30 MG/1
30 TABLET, FILM COATED ORAL
Status: DISCONTINUED | OUTPATIENT
Start: 2019-01-01 | End: 2019-01-01 | Stop reason: HOSPADM

## 2019-01-01 RX ORDER — IBUPROFEN 200 MG
24 TABLET ORAL
Status: DISCONTINUED | OUTPATIENT
Start: 2019-01-01 | End: 2019-01-01 | Stop reason: HOSPADM

## 2019-01-01 RX ORDER — HYDRALAZINE HYDROCHLORIDE 25 MG/1
100 TABLET, FILM COATED ORAL
Status: COMPLETED | OUTPATIENT
Start: 2019-01-01 | End: 2019-01-01

## 2019-01-01 RX ORDER — LEVETIRACETAM 500 MG/1
500 TABLET ORAL 2 TIMES DAILY
Status: CANCELLED | OUTPATIENT
Start: 2019-01-01

## 2019-01-01 RX ORDER — LEVETIRACETAM 500 MG/1
500 TABLET ORAL
Status: CANCELLED | OUTPATIENT
Start: 2019-01-01

## 2019-01-01 RX ORDER — SEVELAMER CARBONATE 800 MG/1
800 TABLET, FILM COATED ORAL
Status: DISCONTINUED | OUTPATIENT
Start: 2019-01-01 | End: 2019-01-01 | Stop reason: HOSPADM

## 2019-01-01 RX ORDER — CALCIUM CARBONATE 500(1250)
1000 TABLET ORAL 3 TIMES DAILY
Status: DISCONTINUED | OUTPATIENT
Start: 2019-01-01 | End: 2019-01-01

## 2019-01-01 RX ORDER — METOCLOPRAMIDE HYDROCHLORIDE 5 MG/ML
10 INJECTION INTRAMUSCULAR; INTRAVENOUS
Status: COMPLETED | OUTPATIENT
Start: 2019-01-01 | End: 2019-01-01

## 2019-01-01 RX ORDER — CALCITRIOL 1 UG/ML
0.5 SOLUTION ORAL 2 TIMES DAILY
Status: DISCONTINUED | OUTPATIENT
Start: 2019-01-01 | End: 2019-01-01

## 2019-01-01 RX ORDER — BISACODYL 10 MG
10 SUPPOSITORY, RECTAL RECTAL DAILY
Status: CANCELLED | OUTPATIENT
Start: 2019-01-01

## 2019-01-01 RX ORDER — IPRATROPIUM BROMIDE AND ALBUTEROL SULFATE 2.5; .5 MG/3ML; MG/3ML
3 SOLUTION RESPIRATORY (INHALATION) EVERY 4 HOURS PRN
Status: DISCONTINUED | OUTPATIENT
Start: 2019-01-01 | End: 2019-01-01 | Stop reason: HOSPADM

## 2019-01-01 RX ORDER — CALCIUM CARBONATE 500(1250)
1500 TABLET ORAL 3 TIMES DAILY
Status: DISCONTINUED | OUTPATIENT
Start: 2019-01-01 | End: 2019-01-01

## 2019-01-01 RX ORDER — SUCCINYLCHOLINE CHLORIDE 20 MG/ML
INJECTION INTRAMUSCULAR; INTRAVENOUS
Status: DISCONTINUED | OUTPATIENT
Start: 2019-01-01 | End: 2019-01-01

## 2019-01-01 RX ORDER — HYDRALAZINE HYDROCHLORIDE 100 MG/1
100 TABLET, FILM COATED ORAL EVERY 8 HOURS
Qty: 180 TABLET | Refills: 3 | Status: ON HOLD | OUTPATIENT
Start: 2019-01-01 | End: 2019-01-01 | Stop reason: SDUPTHER

## 2019-01-01 RX ORDER — FAMOTIDINE 20 MG/1
20 TABLET, FILM COATED ORAL DAILY
Status: DISCONTINUED | OUTPATIENT
Start: 2019-01-01 | End: 2019-01-01

## 2019-01-01 RX ORDER — DICYCLOMINE HYDROCHLORIDE 10 MG/ML
20 INJECTION INTRAMUSCULAR
Status: COMPLETED | OUTPATIENT
Start: 2019-01-01 | End: 2019-01-01

## 2019-01-01 RX ORDER — TACROLIMUS 1 MG/1
4 CAPSULE ORAL EVERY MORNING
Status: DISCONTINUED | OUTPATIENT
Start: 2019-01-01 | End: 2019-01-01 | Stop reason: HOSPADM

## 2019-01-01 RX ORDER — ISOSORBIDE MONONITRATE 60 MG/1
60 TABLET, EXTENDED RELEASE ORAL DAILY
Status: DISCONTINUED | OUTPATIENT
Start: 2019-01-01 | End: 2019-01-01

## 2019-01-01 RX ORDER — ONDANSETRON 4 MG/1
4 TABLET, ORALLY DISINTEGRATING ORAL EVERY 6 HOURS PRN
Status: DISCONTINUED | OUTPATIENT
Start: 2019-01-01 | End: 2019-01-01

## 2019-01-01 RX ORDER — GLUCAGON 1 MG
1 KIT INJECTION
Status: DISCONTINUED | OUTPATIENT
Start: 2019-01-01 | End: 2019-01-01

## 2019-01-01 RX ORDER — ONDANSETRON 2 MG/ML
4 INJECTION INTRAMUSCULAR; INTRAVENOUS DAILY PRN
Status: DISCONTINUED | OUTPATIENT
Start: 2019-01-01 | End: 2019-01-01 | Stop reason: HOSPADM

## 2019-01-01 RX ORDER — MINOXIDIL 2.5 MG/1
5 TABLET ORAL 2 TIMES DAILY
Status: ON HOLD | COMMUNITY
End: 2019-01-01

## 2019-01-01 RX ORDER — LABETALOL HCL 20 MG/4 ML
40 SYRINGE (ML) INTRAVENOUS ONCE
Status: COMPLETED | OUTPATIENT
Start: 2019-01-01 | End: 2019-01-01

## 2019-01-01 RX ORDER — BACITRACIN 500 [USP'U]/G
OINTMENT TOPICAL
Status: CANCELLED | OUTPATIENT
Start: 2019-01-01

## 2019-01-01 RX ORDER — HYDRALAZINE HYDROCHLORIDE 20 MG/ML
10 INJECTION INTRAMUSCULAR; INTRAVENOUS
Status: DISCONTINUED | OUTPATIENT
Start: 2019-01-01 | End: 2019-01-01

## 2019-01-01 RX ORDER — MANNITOL 20 G/100ML
50 INJECTION, SOLUTION INTRAVENOUS ONCE
Status: COMPLETED | OUTPATIENT
Start: 2019-01-01 | End: 2019-01-01

## 2019-01-01 RX ORDER — HYDRALAZINE HYDROCHLORIDE 20 MG/ML
INJECTION INTRAMUSCULAR; INTRAVENOUS
Status: COMPLETED
Start: 2019-01-01 | End: 2019-01-01

## 2019-01-01 RX ORDER — CALCITRIOL 0.25 UG/1
1 CAPSULE ORAL 2 TIMES DAILY
Status: DISCONTINUED | OUTPATIENT
Start: 2019-01-01 | End: 2019-01-01 | Stop reason: HOSPADM

## 2019-01-01 RX ORDER — BACITRACIN 50000 [IU]/1
INJECTION, POWDER, FOR SOLUTION INTRAMUSCULAR
Status: DISCONTINUED | OUTPATIENT
Start: 2019-01-01 | End: 2019-01-01 | Stop reason: HOSPADM

## 2019-01-01 RX ORDER — SODIUM CHLORIDE 9 MG/ML
INJECTION, SOLUTION INTRAVENOUS ONCE
Status: DISCONTINUED | OUTPATIENT
Start: 2019-01-01 | End: 2019-01-01

## 2019-01-01 RX ORDER — LOSARTAN POTASSIUM 50 MG/1
100 TABLET ORAL
COMMUNITY
End: 2019-01-01

## 2019-01-01 RX ORDER — TACROLIMUS 1 MG/1
4 CAPSULE ORAL EVERY EVENING
Status: CANCELLED | OUTPATIENT
Start: 2019-01-01

## 2019-01-01 RX ORDER — CALCIUM CARBONATE 500(1250)
2000 TABLET ORAL 3 TIMES DAILY
Status: DISCONTINUED | OUTPATIENT
Start: 2019-01-01 | End: 2019-01-01

## 2019-01-01 RX ORDER — PHENYLEPHRINE HYDROCHLORIDE 10 MG/ML
INJECTION INTRAVENOUS
Status: DISCONTINUED | OUTPATIENT
Start: 2019-01-01 | End: 2019-01-01

## 2019-01-01 RX ORDER — LEVETIRACETAM 500 MG/1
500 TABLET ORAL 2 TIMES DAILY
Qty: 60 TABLET | Refills: 11 | Status: SHIPPED | OUTPATIENT
Start: 2019-01-01 | End: 2020-03-12

## 2019-01-01 RX ORDER — HYDROMORPHONE HYDROCHLORIDE 1 MG/ML
0.2 INJECTION, SOLUTION INTRAMUSCULAR; INTRAVENOUS; SUBCUTANEOUS EVERY 5 MIN PRN
Status: DISCONTINUED | OUTPATIENT
Start: 2019-01-01 | End: 2019-01-01 | Stop reason: HOSPADM

## 2019-01-01 RX ORDER — HYDROCODONE BITARTRATE AND ACETAMINOPHEN 500; 5 MG/1; MG/1
TABLET ORAL
Status: CANCELLED | OUTPATIENT
Start: 2019-01-01

## 2019-01-01 RX ORDER — LACOSAMIDE 50 MG/1
50 TABLET ORAL EVERY 12 HOURS
Status: DISCONTINUED | OUTPATIENT
Start: 2019-01-01 | End: 2019-01-01 | Stop reason: HOSPADM

## 2019-01-01 RX ORDER — SODIUM CHLORIDE 0.9 % (FLUSH) 0.9 %
3 SYRINGE (ML) INJECTION
Status: DISCONTINUED | OUTPATIENT
Start: 2019-01-01 | End: 2019-01-01

## 2019-01-01 RX ORDER — CALCITRIOL 0.5 UG/1
2 CAPSULE ORAL 2 TIMES DAILY
Status: DISCONTINUED | OUTPATIENT
Start: 2019-01-01 | End: 2019-01-01 | Stop reason: HOSPADM

## 2019-01-01 RX ORDER — DIPHENHYDRAMINE HYDROCHLORIDE 50 MG/ML
12.5 INJECTION INTRAMUSCULAR; INTRAVENOUS
Status: COMPLETED | OUTPATIENT
Start: 2019-01-01 | End: 2019-01-01

## 2019-01-01 RX ORDER — DIPHENHYDRAMINE HCL 25 MG
25 CAPSULE ORAL EVERY 6 HOURS PRN
Status: DISCONTINUED | OUTPATIENT
Start: 2019-01-01 | End: 2019-01-01 | Stop reason: HOSPADM

## 2019-01-01 RX ORDER — SEVELAMER CARBONATE 800 MG/1
800 TABLET, FILM COATED ORAL
Status: DISCONTINUED | OUTPATIENT
Start: 2019-01-01 | End: 2019-01-01

## 2019-01-01 RX ORDER — TACROLIMUS 1 MG/1
4 CAPSULE ORAL EVERY MORNING
Status: CANCELLED | OUTPATIENT
Start: 2019-01-01

## 2019-01-01 RX ORDER — ERGOCALCIFEROL 1.25 MG/1
50000 CAPSULE ORAL DAILY
Status: DISCONTINUED | OUTPATIENT
Start: 2019-01-01 | End: 2019-01-01

## 2019-01-01 RX ORDER — SODIUM CHLORIDE 0.9 % (FLUSH) 0.9 %
3 SYRINGE (ML) INJECTION
Status: DISCONTINUED | OUTPATIENT
Start: 2019-01-01 | End: 2019-01-01 | Stop reason: HOSPADM

## 2019-01-01 RX ORDER — LACOSAMIDE 50 MG/1
50 TABLET ORAL 2 TIMES DAILY
Status: DISCONTINUED | OUTPATIENT
Start: 2019-01-01 | End: 2019-01-01

## 2019-01-01 RX ORDER — CLONIDINE HYDROCHLORIDE 0.3 MG/1
0.3 TABLET ORAL EVERY 8 HOURS
Qty: 90 TABLET | Refills: 11 | Status: SHIPPED | OUTPATIENT
Start: 2019-01-01 | End: 2019-01-01

## 2019-01-01 RX ORDER — ONDANSETRON 4 MG/1
4 TABLET, ORALLY DISINTEGRATING ORAL EVERY 6 HOURS PRN
Status: CANCELLED | OUTPATIENT
Start: 2019-01-01

## 2019-01-01 RX ORDER — LIDOCAINE HCL/PF 100 MG/5ML
SYRINGE (ML) INTRAVENOUS
Status: DISCONTINUED | OUTPATIENT
Start: 2019-01-01 | End: 2019-01-01

## 2019-01-01 RX ORDER — ISOSORBIDE MONONITRATE 30 MG/1
90 TABLET, EXTENDED RELEASE ORAL DAILY
Status: DISCONTINUED | OUTPATIENT
Start: 2019-01-01 | End: 2019-01-01 | Stop reason: HOSPADM

## 2019-01-01 RX ORDER — AMOXICILLIN 250 MG
2 CAPSULE ORAL DAILY
Status: DISCONTINUED | OUTPATIENT
Start: 2019-01-01 | End: 2019-01-01 | Stop reason: HOSPADM

## 2019-01-01 RX ORDER — HYDRALAZINE HYDROCHLORIDE 25 MG/1
50 TABLET, FILM COATED ORAL EVERY 8 HOURS
Status: DISCONTINUED | OUTPATIENT
Start: 2019-01-01 | End: 2019-01-01

## 2019-01-01 RX ORDER — AMOXICILLIN 250 MG
2 CAPSULE ORAL DAILY
Status: DISCONTINUED | OUTPATIENT
Start: 2019-01-01 | End: 2019-01-01

## 2019-01-01 RX ORDER — SODIUM CHLORIDE 9 MG/ML
INJECTION, SOLUTION INTRAVENOUS
Status: DISCONTINUED | OUTPATIENT
Start: 2019-01-01 | End: 2019-01-01 | Stop reason: HOSPADM

## 2019-01-01 RX ORDER — LIDOCAINE HYDROCHLORIDE 10 MG/ML
1 INJECTION, SOLUTION EPIDURAL; INFILTRATION; INTRACAUDAL; PERINEURAL
Status: DISCONTINUED | OUTPATIENT
Start: 2019-01-01 | End: 2019-01-01 | Stop reason: HOSPADM

## 2019-01-01 RX ORDER — LABETALOL HCL 20 MG/4 ML
10 SYRINGE (ML) INTRAVENOUS EVERY 6 HOURS PRN
Status: DISCONTINUED | OUTPATIENT
Start: 2019-01-01 | End: 2019-01-01 | Stop reason: HOSPADM

## 2019-01-01 RX ORDER — HYDRALAZINE HYDROCHLORIDE 25 MG/1
100 TABLET, FILM COATED ORAL EVERY 8 HOURS
Status: DISCONTINUED | OUTPATIENT
Start: 2019-01-01 | End: 2019-01-01 | Stop reason: HOSPADM

## 2019-01-01 RX ORDER — ACETAMINOPHEN 325 MG/1
650 TABLET ORAL EVERY 4 HOURS PRN
Status: DISCONTINUED | OUTPATIENT
Start: 2019-01-01 | End: 2019-01-01 | Stop reason: HOSPADM

## 2019-01-01 RX ORDER — IBUPROFEN 200 MG
16 TABLET ORAL
Status: DISCONTINUED | OUTPATIENT
Start: 2019-01-01 | End: 2019-01-01 | Stop reason: HOSPADM

## 2019-01-01 RX ORDER — DIPHENHYDRAMINE HCL 25 MG
25 CAPSULE ORAL
Status: COMPLETED | OUTPATIENT
Start: 2019-01-01 | End: 2019-01-01

## 2019-01-01 RX ORDER — LEVETIRACETAM 500 MG/1
500 TABLET ORAL
Status: DISCONTINUED | OUTPATIENT
Start: 2019-01-01 | End: 2019-01-01 | Stop reason: HOSPADM

## 2019-01-01 RX ORDER — BISACODYL 10 MG
10 SUPPOSITORY, RECTAL RECTAL DAILY
Status: DISCONTINUED | OUTPATIENT
Start: 2019-01-01 | End: 2019-01-01 | Stop reason: HOSPADM

## 2019-01-01 RX ORDER — CALCITRIOL 0.5 UG/1
1 CAPSULE ORAL 2 TIMES DAILY
Status: DISCONTINUED | OUTPATIENT
Start: 2019-01-01 | End: 2019-01-01

## 2019-01-01 RX ORDER — ONDANSETRON 4 MG/1
4 TABLET, ORALLY DISINTEGRATING ORAL EVERY 6 HOURS PRN
Status: DISCONTINUED | OUTPATIENT
Start: 2019-01-01 | End: 2019-01-01 | Stop reason: HOSPADM

## 2019-01-01 RX ORDER — PANTOPRAZOLE SODIUM 40 MG/1
40 TABLET, DELAYED RELEASE ORAL
Status: DISCONTINUED | OUTPATIENT
Start: 2019-01-01 | End: 2019-01-01

## 2019-01-01 RX ORDER — CALCIUM CARBONATE 500(1250)
2000 TABLET ORAL
Refills: 0 | Status: ON HOLD | COMMUNITY
Start: 2019-01-01 | End: 2019-01-01 | Stop reason: HOSPADM

## 2019-01-01 RX ORDER — SODIUM CHLORIDE 9 MG/ML
500 INJECTION, SOLUTION INTRAVENOUS
Status: COMPLETED | OUTPATIENT
Start: 2019-01-01 | End: 2019-01-01

## 2019-01-01 RX ORDER — IRBESARTAN 300 MG/1
300 TABLET ORAL DAILY
Status: DISCONTINUED | OUTPATIENT
Start: 2019-01-01 | End: 2019-01-01

## 2019-01-01 RX ORDER — CALCIUM CARBONATE 500(1250)
3000 TABLET ORAL 3 TIMES DAILY
Status: DISCONTINUED | OUTPATIENT
Start: 2019-01-01 | End: 2019-01-01

## 2019-01-01 RX ORDER — ONDANSETRON 2 MG/ML
INJECTION INTRAMUSCULAR; INTRAVENOUS
Status: DISCONTINUED | OUTPATIENT
Start: 2019-01-01 | End: 2019-01-01

## 2019-01-01 RX ORDER — ISOSORBIDE MONONITRATE 30 MG/1
90 TABLET, EXTENDED RELEASE ORAL DAILY
Qty: 90 TABLET | Refills: 11 | Status: SHIPPED | OUTPATIENT
Start: 2019-01-01 | End: 2019-01-01

## 2019-01-01 RX ORDER — PANTOPRAZOLE SODIUM 40 MG/1
40 TABLET, DELAYED RELEASE ORAL DAILY
Status: DISCONTINUED | OUTPATIENT
Start: 2019-01-01 | End: 2019-01-01 | Stop reason: HOSPADM

## 2019-01-01 RX ORDER — MORPHINE SULFATE 2 MG/ML
2 INJECTION, SOLUTION INTRAMUSCULAR; INTRAVENOUS EVERY 4 HOURS PRN
Status: DISCONTINUED | OUTPATIENT
Start: 2019-01-01 | End: 2019-01-01 | Stop reason: HOSPADM

## 2019-01-01 RX ORDER — AMOXICILLIN 250 MG
2 CAPSULE ORAL DAILY
COMMUNITY
Start: 2019-01-01

## 2019-01-01 RX ORDER — VERAPAMIL HYDROCHLORIDE 240 MG/1
240 TABLET, FILM COATED, EXTENDED RELEASE ORAL NIGHTLY
Status: DISCONTINUED | OUTPATIENT
Start: 2019-01-01 | End: 2019-01-01 | Stop reason: HOSPADM

## 2019-01-01 RX ORDER — HYDRALAZINE HYDROCHLORIDE 20 MG/ML
10 INJECTION INTRAMUSCULAR; INTRAVENOUS
Status: COMPLETED | OUTPATIENT
Start: 2019-01-01 | End: 2019-01-01

## 2019-01-01 RX ORDER — MANNITOL 20 G/100ML
INJECTION, SOLUTION INTRAVENOUS
Status: DISPENSED
Start: 2019-01-01 | End: 2019-01-01

## 2019-01-01 RX ORDER — TACROLIMUS 1 MG/1
4 CAPSULE ORAL EVERY MORNING
Status: DISCONTINUED | OUTPATIENT
Start: 2019-01-01 | End: 2019-01-01

## 2019-01-01 RX ORDER — NICARDIPINE HYDROCHLORIDE 0.2 MG/ML
INJECTION INTRAVENOUS
Status: COMPLETED
Start: 2019-01-01 | End: 2019-01-01

## 2019-01-01 RX ORDER — SODIUM,POTASSIUM PHOSPHATES 280-250MG
1 POWDER IN PACKET (EA) ORAL ONCE
Status: COMPLETED | OUTPATIENT
Start: 2019-01-01 | End: 2019-01-01

## 2019-01-01 RX ORDER — CLONIDINE HYDROCHLORIDE 0.1 MG/1
0.3 TABLET ORAL 2 TIMES DAILY
Qty: 60 TABLET | Refills: 1 | OUTPATIENT
Start: 2019-01-01 | End: 2019-01-01

## 2019-01-01 RX ORDER — OXYCODONE HYDROCHLORIDE 5 MG/1
5 TABLET ORAL EVERY 6 HOURS PRN
Qty: 40 TABLET | Refills: 0 | Status: ON HOLD | OUTPATIENT
Start: 2019-01-01 | End: 2019-01-01

## 2019-01-01 RX ORDER — CALCIUM CARBONATE 200(500)MG
1000 TABLET,CHEWABLE ORAL ONCE
Status: COMPLETED | OUTPATIENT
Start: 2019-01-01 | End: 2019-01-01

## 2019-01-01 RX ORDER — POLYETHYLENE GLYCOL 3350 17 G/17G
17 POWDER, FOR SOLUTION ORAL DAILY
Status: DISCONTINUED | OUTPATIENT
Start: 2019-01-01 | End: 2019-01-01

## 2019-01-01 RX ORDER — HYDRALAZINE HYDROCHLORIDE 50 MG/1
100 TABLET, FILM COATED ORAL EVERY 8 HOURS
Status: DISCONTINUED | OUTPATIENT
Start: 2019-01-01 | End: 2019-01-01 | Stop reason: HOSPADM

## 2019-01-01 RX ORDER — CALCITRIOL 0.25 UG/1
0.75 CAPSULE ORAL 2 TIMES DAILY
Status: DISCONTINUED | OUTPATIENT
Start: 2019-01-01 | End: 2019-01-01

## 2019-01-01 RX ORDER — ROCURONIUM BROMIDE 10 MG/ML
50 INJECTION, SOLUTION INTRAVENOUS ONCE
Status: COMPLETED | OUTPATIENT
Start: 2019-01-01 | End: 2019-01-01

## 2019-01-01 RX ORDER — TACROLIMUS 1 MG/1
4 CAPSULE ORAL EVERY EVENING
Status: COMPLETED | OUTPATIENT
Start: 2019-01-01 | End: 2019-01-01

## 2019-01-01 RX ORDER — LABETALOL 300 MG/1
300 TABLET, FILM COATED ORAL EVERY 8 HOURS
Qty: 90 TABLET | Refills: 0 | Status: SHIPPED | OUTPATIENT
Start: 2019-01-01 | End: 2019-01-01

## 2019-01-01 RX ORDER — VERAPAMIL HYDROCHLORIDE 120 MG/1
240 TABLET, FILM COATED, EXTENDED RELEASE ORAL DAILY
Status: DISCONTINUED | OUTPATIENT
Start: 2019-01-01 | End: 2019-01-01 | Stop reason: HOSPADM

## 2019-01-01 RX ORDER — VERAPAMIL HYDROCHLORIDE 240 MG/1
240 CAPSULE, EXTENDED RELEASE ORAL NIGHTLY
Qty: 30 CAPSULE | Refills: 11 | Status: SHIPPED | OUTPATIENT
Start: 2019-01-01 | End: 2020-05-28

## 2019-01-01 RX ORDER — DEXAMETHASONE SODIUM PHOSPHATE 4 MG/ML
INJECTION, SOLUTION INTRA-ARTICULAR; INTRALESIONAL; INTRAMUSCULAR; INTRAVENOUS; SOFT TISSUE
Status: DISCONTINUED | OUTPATIENT
Start: 2019-01-01 | End: 2019-01-01

## 2019-01-01 RX ORDER — MIDAZOLAM HYDROCHLORIDE 1 MG/ML
2 INJECTION INTRAMUSCULAR; INTRAVENOUS
Status: DISCONTINUED | OUTPATIENT
Start: 2019-01-01 | End: 2019-01-01

## 2019-01-01 RX ORDER — BUPIVACAINE HYDROCHLORIDE AND EPINEPHRINE 5; 5 MG/ML; UG/ML
INJECTION, SOLUTION EPIDURAL; INTRACAUDAL; PERINEURAL
Status: DISCONTINUED | OUTPATIENT
Start: 2019-01-01 | End: 2019-01-01 | Stop reason: HOSPADM

## 2019-01-01 RX ORDER — ACETAMINOPHEN 325 MG/1
650 TABLET ORAL EVERY 8 HOURS PRN
Status: DISCONTINUED | OUTPATIENT
Start: 2019-01-01 | End: 2019-01-01 | Stop reason: HOSPADM

## 2019-01-01 RX ORDER — AMOXICILLIN 250 MG
2 CAPSULE ORAL DAILY
Status: CANCELLED | OUTPATIENT
Start: 2019-01-01

## 2019-01-01 RX ORDER — VIT C/E/ZN/COPPR/LUTEIN/ZEAXAN 250MG-90MG
2000 CAPSULE ORAL DAILY
Refills: 0 | COMMUNITY
Start: 2019-01-01 | End: 2019-01-01 | Stop reason: ALTCHOICE

## 2019-01-01 RX ORDER — NEOSTIGMINE METHYLSULFATE 1 MG/ML
INJECTION, SOLUTION INTRAVENOUS
Status: DISCONTINUED | OUTPATIENT
Start: 2019-01-01 | End: 2019-01-01

## 2019-01-01 RX ORDER — LIDOCAINE HYDROCHLORIDE 10 MG/ML
1 INJECTION, SOLUTION EPIDURAL; INFILTRATION; INTRACAUDAL; PERINEURAL ONCE
Status: CANCELLED | OUTPATIENT
Start: 2019-01-01 | End: 2019-01-01

## 2019-01-01 RX ORDER — TACROLIMUS 5 MG/1
5 CAPSULE ORAL EVERY MORNING
Qty: 30 CAPSULE | Refills: 11 | Status: SHIPPED | OUTPATIENT
Start: 2019-01-01 | End: 2019-01-01 | Stop reason: HOSPADM

## 2019-01-01 RX ORDER — ONDANSETRON 2 MG/ML
4 INJECTION INTRAMUSCULAR; INTRAVENOUS
Status: COMPLETED | OUTPATIENT
Start: 2019-01-01 | End: 2019-01-01

## 2019-01-01 RX ORDER — OXYCODONE HYDROCHLORIDE 5 MG/1
5 TABLET ORAL EVERY 4 HOURS PRN
Status: DISCONTINUED | OUTPATIENT
Start: 2019-01-01 | End: 2019-01-01

## 2019-01-01 RX ORDER — HYDRALAZINE HYDROCHLORIDE 20 MG/ML
10 INJECTION INTRAMUSCULAR; INTRAVENOUS ONCE
Status: COMPLETED | OUTPATIENT
Start: 2019-01-01 | End: 2019-01-01

## 2019-01-01 RX ORDER — DEXAMETHASONE SODIUM PHOSPHATE 4 MG/ML
4 INJECTION, SOLUTION INTRA-ARTICULAR; INTRALESIONAL; INTRAMUSCULAR; INTRAVENOUS; SOFT TISSUE EVERY 6 HOURS
Status: DISCONTINUED | OUTPATIENT
Start: 2019-01-01 | End: 2019-01-01

## 2019-01-01 RX ORDER — METOPROLOL TARTRATE 1 MG/ML
INJECTION, SOLUTION INTRAVENOUS
Status: DISCONTINUED | OUTPATIENT
Start: 2019-01-01 | End: 2019-01-01

## 2019-01-01 RX ORDER — CALCITRIOL 0.25 UG/1
0.25 CAPSULE ORAL 2 TIMES DAILY
Status: DISCONTINUED | OUTPATIENT
Start: 2019-01-01 | End: 2019-01-01

## 2019-01-01 RX ORDER — FENTANYL CITRATE 50 UG/ML
25 INJECTION, SOLUTION INTRAMUSCULAR; INTRAVENOUS EVERY 5 MIN PRN
Status: DISCONTINUED | OUTPATIENT
Start: 2019-01-01 | End: 2019-01-01 | Stop reason: HOSPADM

## 2019-01-01 RX ORDER — MINOXIDIL 2.5 MG/1
10 TABLET ORAL DAILY
Status: DISCONTINUED | OUTPATIENT
Start: 2019-01-01 | End: 2019-01-01 | Stop reason: HOSPADM

## 2019-01-01 RX ORDER — TACROLIMUS 5 MG/1
5 CAPSULE ORAL NIGHTLY
Qty: 30 CAPSULE | Refills: 11 | Status: SHIPPED | OUTPATIENT
Start: 2019-01-01 | End: 2019-01-01 | Stop reason: HOSPADM

## 2019-01-01 RX ORDER — OXYCODONE HYDROCHLORIDE 10 MG/1
10 TABLET ORAL EVERY 4 HOURS PRN
Status: DISCONTINUED | OUTPATIENT
Start: 2019-01-01 | End: 2019-01-01

## 2019-01-01 RX ORDER — SEVELAMER CARBONATE 800 MG/1
800 TABLET, FILM COATED ORAL
Qty: 90 TABLET | Refills: 11 | Status: ON HOLD | OUTPATIENT
Start: 2019-01-01 | End: 2019-01-01 | Stop reason: SDUPTHER

## 2019-01-01 RX ORDER — CLONIDINE HYDROCHLORIDE 0.1 MG/1
0.1 TABLET ORAL 2 TIMES DAILY
Qty: 60 TABLET | Refills: 1 | Status: ON HOLD | OUTPATIENT
Start: 2019-01-01 | End: 2019-01-01 | Stop reason: HOSPADM

## 2019-01-01 RX ORDER — MORPHINE SULFATE 2 MG/ML
1 INJECTION, SOLUTION INTRAMUSCULAR; INTRAVENOUS EVERY 4 HOURS PRN
Status: DISCONTINUED | OUTPATIENT
Start: 2019-01-01 | End: 2019-01-01 | Stop reason: HOSPADM

## 2019-01-01 RX ORDER — ESMOLOL HYDROCHLORIDE 10 MG/ML
INJECTION INTRAVENOUS
Status: DISCONTINUED | OUTPATIENT
Start: 2019-01-01 | End: 2019-01-01

## 2019-01-01 RX ORDER — ROCURONIUM BROMIDE 10 MG/ML
INJECTION, SOLUTION INTRAVENOUS
Status: COMPLETED
Start: 2019-01-01 | End: 2019-01-01

## 2019-01-01 RX ORDER — LEVETIRACETAM 500 MG/1
500 TABLET ORAL ONCE
Status: DISCONTINUED | OUTPATIENT
Start: 2019-01-01 | End: 2019-01-01

## 2019-01-01 RX ORDER — INSULIN ASPART 100 [IU]/ML
1-10 INJECTION, SOLUTION INTRAVENOUS; SUBCUTANEOUS
Status: DISCONTINUED | OUTPATIENT
Start: 2019-01-01 | End: 2019-01-01

## 2019-01-01 RX ORDER — HYDRALAZINE HYDROCHLORIDE 50 MG/1
50 TABLET, FILM COATED ORAL EVERY 8 HOURS
Status: DISCONTINUED | OUTPATIENT
Start: 2019-01-01 | End: 2019-01-01

## 2019-01-01 RX ORDER — SODIUM CHLORIDE 9 MG/ML
INJECTION, SOLUTION INTRAVENOUS CONTINUOUS
Status: DISCONTINUED | OUTPATIENT
Start: 2019-01-01 | End: 2019-01-01

## 2019-01-01 RX ORDER — TACROLIMUS 5 MG/1
5 CAPSULE ORAL DAILY
Qty: 30 CAPSULE | Refills: 11 | Status: ON HOLD | OUTPATIENT
Start: 2019-01-01 | End: 2019-01-01 | Stop reason: SDUPTHER

## 2019-01-01 RX ORDER — MUPIROCIN 20 MG/G
1 OINTMENT TOPICAL
Status: DISCONTINUED | OUTPATIENT
Start: 2019-01-01 | End: 2019-01-01 | Stop reason: HOSPADM

## 2019-01-01 RX ORDER — ONDANSETRON 8 MG/1
8 TABLET, ORALLY DISINTEGRATING ORAL EVERY 8 HOURS PRN
Status: DISCONTINUED | OUTPATIENT
Start: 2019-01-01 | End: 2019-01-01 | Stop reason: HOSPADM

## 2019-01-01 RX ORDER — HYDRALAZINE HYDROCHLORIDE 20 MG/ML
10 INJECTION INTRAMUSCULAR; INTRAVENOUS EVERY 4 HOURS PRN
Status: DISCONTINUED | OUTPATIENT
Start: 2019-01-01 | End: 2019-01-01

## 2019-01-01 RX ORDER — BACITRACIN 500 [USP'U]/G
OINTMENT TOPICAL
Status: DISCONTINUED | OUTPATIENT
Start: 2019-01-01 | End: 2019-01-01 | Stop reason: HOSPADM

## 2019-01-01 RX ORDER — ALBUTEROL SULFATE 90 UG/1
2 AEROSOL, METERED RESPIRATORY (INHALATION) EVERY 6 HOURS PRN
Qty: 18 G | Refills: 11 | Status: SHIPPED | OUTPATIENT
Start: 2019-01-01 | End: 2020-09-03

## 2019-01-01 RX ORDER — FENTANYL CITRATE 50 UG/ML
12.5 INJECTION, SOLUTION INTRAMUSCULAR; INTRAVENOUS ONCE
Status: COMPLETED | OUTPATIENT
Start: 2019-01-01 | End: 2019-01-01

## 2019-01-01 RX ORDER — PROPOFOL 10 MG/ML
INJECTION, EMULSION INTRAVENOUS
Status: DISCONTINUED
Start: 2019-01-01 | End: 2019-01-01 | Stop reason: WASHOUT

## 2019-01-01 RX ORDER — NICARDIPINE HYDROCHLORIDE 2.5 MG/ML
INJECTION INTRAVENOUS
Status: DISCONTINUED | OUTPATIENT
Start: 2019-01-01 | End: 2019-01-01

## 2019-01-01 RX ORDER — CLONIDINE HYDROCHLORIDE 0.1 MG/1
0.1 TABLET ORAL 2 TIMES DAILY
Status: DISCONTINUED | OUTPATIENT
Start: 2019-01-01 | End: 2019-01-01 | Stop reason: HOSPADM

## 2019-01-01 RX ORDER — OXYCODONE HYDROCHLORIDE 5 MG/1
5 TABLET ORAL EVERY 6 HOURS PRN
Status: DISCONTINUED | OUTPATIENT
Start: 2019-01-01 | End: 2019-01-01

## 2019-01-01 RX ORDER — SODIUM CHLORIDE 0.9 % (FLUSH) 0.9 %
10 SYRINGE (ML) INJECTION
Status: CANCELLED | OUTPATIENT
Start: 2019-01-01

## 2019-01-01 RX ORDER — CLONIDINE HYDROCHLORIDE 0.1 MG/1
0.3 TABLET ORAL 3 TIMES DAILY
Qty: 30 TABLET | Refills: 11 | Status: SHIPPED | OUTPATIENT
Start: 2019-01-01 | End: 2019-01-01

## 2019-01-01 RX ORDER — CARVEDILOL 25 MG/1
25 TABLET ORAL 2 TIMES DAILY
Qty: 60 TABLET | Refills: 11 | Status: ON HOLD | OUTPATIENT
Start: 2019-01-01 | End: 2019-01-01 | Stop reason: HOSPADM

## 2019-01-01 RX ORDER — CLONIDINE HYDROCHLORIDE 0.1 MG/1
0.1 TABLET ORAL 2 TIMES DAILY
Status: DISCONTINUED | OUTPATIENT
Start: 2019-01-01 | End: 2019-01-01

## 2019-01-01 RX ORDER — PSEUDOEPHEDRINE/ACETAMINOPHEN 30MG-500MG
100 TABLET ORAL
Status: COMPLETED | OUTPATIENT
Start: 2019-01-01 | End: 2019-01-01

## 2019-01-01 RX ORDER — DIPHENHYDRAMINE HYDROCHLORIDE 50 MG/ML
12.5 INJECTION INTRAMUSCULAR; INTRAVENOUS EVERY 6 HOURS PRN
Status: DISCONTINUED | OUTPATIENT
Start: 2019-01-01 | End: 2019-01-01

## 2019-01-01 RX ORDER — TACROLIMUS 1 MG/1
1 CAPSULE ORAL ONCE
Status: COMPLETED | OUTPATIENT
Start: 2019-01-01 | End: 2019-01-01

## 2019-01-01 RX ORDER — TACROLIMUS 1 MG/1
6 CAPSULE ORAL 2 TIMES DAILY
Status: DISCONTINUED | OUTPATIENT
Start: 2019-01-01 | End: 2019-01-01

## 2019-01-01 RX ORDER — CALCIUM CARBONATE 200(500)MG
500 TABLET,CHEWABLE ORAL ONCE
Status: COMPLETED | OUTPATIENT
Start: 2019-01-01 | End: 2019-01-01

## 2019-01-01 RX ORDER — LACTULOSE 10 G/15ML
30 SOLUTION ORAL DAILY
Qty: 1350 ML | Refills: 0 | Status: SHIPPED | OUTPATIENT
Start: 2019-01-01 | End: 2019-01-01

## 2019-01-01 RX ORDER — OXYCODONE HYDROCHLORIDE 5 MG/1
5 TABLET ORAL EVERY 6 HOURS PRN
Qty: 40 TABLET | Refills: 0 | Status: SHIPPED | OUTPATIENT
Start: 2019-01-01 | End: 2019-01-01

## 2019-01-01 RX ORDER — CLONIDINE HYDROCHLORIDE 0.1 MG/1
0.1 TABLET ORAL ONCE
Status: COMPLETED | OUTPATIENT
Start: 2019-01-01 | End: 2019-01-01

## 2019-01-01 RX ORDER — TACROLIMUS 5 MG/1
5 CAPSULE ORAL EVERY EVENING
Status: DISCONTINUED | OUTPATIENT
Start: 2019-01-01 | End: 2019-01-01 | Stop reason: HOSPADM

## 2019-01-01 RX ORDER — CALCITRIOL 0.5 UG/1
0.5 CAPSULE ORAL 2 TIMES DAILY
Status: DISCONTINUED | OUTPATIENT
Start: 2019-01-01 | End: 2019-01-01

## 2019-01-01 RX ORDER — CLONIDINE HYDROCHLORIDE 0.3 MG/1
0.3 TABLET ORAL EVERY 8 HOURS
Status: CANCELLED | OUTPATIENT
Start: 2019-01-01

## 2019-01-01 RX ORDER — CALCIUM CARBONATE 500(1250)
2000 TABLET ORAL
Status: DISCONTINUED | OUTPATIENT
Start: 2019-01-01 | End: 2019-01-01 | Stop reason: HOSPADM

## 2019-01-01 RX ORDER — SYRING-NEEDL,DISP,INSUL,0.3 ML 29 G X1/2"
296 SYRINGE, EMPTY DISPOSABLE MISCELLANEOUS
Status: COMPLETED | OUTPATIENT
Start: 2019-01-01 | End: 2019-01-01

## 2019-01-01 RX ORDER — DEXTROSE MONOHYDRATE 100 MG/ML
12.5 INJECTION, SOLUTION INTRAVENOUS
Status: DISCONTINUED | OUTPATIENT
Start: 2019-01-01 | End: 2019-01-01 | Stop reason: HOSPADM

## 2019-01-01 RX ORDER — ACETAMINOPHEN 325 MG/1
650 TABLET ORAL EVERY 4 HOURS PRN
Status: DISCONTINUED | OUTPATIENT
Start: 2019-01-01 | End: 2019-01-01

## 2019-01-01 RX ORDER — DICYCLOMINE HYDROCHLORIDE 20 MG/1
20 TABLET ORAL EVERY 6 HOURS
Qty: 30 TABLET | Refills: 0 | Status: ON HOLD | OUTPATIENT
Start: 2019-01-01 | End: 2019-01-01 | Stop reason: HOSPADM

## 2019-01-01 RX ORDER — ISOSORBIDE MONONITRATE 30 MG/1
30 TABLET, EXTENDED RELEASE ORAL DAILY
Status: DISCONTINUED | OUTPATIENT
Start: 2019-01-01 | End: 2019-01-01 | Stop reason: HOSPADM

## 2019-01-01 RX ORDER — DEXTROSE MONOHYDRATE 100 MG/ML
25 INJECTION, SOLUTION INTRAVENOUS
Status: DISCONTINUED | OUTPATIENT
Start: 2019-01-01 | End: 2019-01-01 | Stop reason: HOSPADM

## 2019-01-01 RX ORDER — PANTOPRAZOLE SODIUM 40 MG/1
40 TABLET, DELAYED RELEASE ORAL DAILY
Status: CANCELLED | OUTPATIENT
Start: 2019-01-01

## 2019-01-01 RX ORDER — ACETAMINOPHEN 325 MG/1
650 TABLET ORAL EVERY 8 HOURS
Status: DISCONTINUED | OUTPATIENT
Start: 2019-01-01 | End: 2019-01-01

## 2019-01-01 RX ORDER — MANNITOL 20 G/100ML
25 INJECTION, SOLUTION INTRAVENOUS ONCE
Status: COMPLETED | OUTPATIENT
Start: 2019-01-01 | End: 2019-01-01

## 2019-01-01 RX ORDER — CALCIUM CARBONATE 200(500)MG
1500 TABLET,CHEWABLE ORAL ONCE
Status: COMPLETED | OUTPATIENT
Start: 2019-01-01 | End: 2019-01-01

## 2019-01-01 RX ORDER — HEPARIN SODIUM 5000 [USP'U]/ML
5000 INJECTION, SOLUTION INTRAVENOUS; SUBCUTANEOUS EVERY 8 HOURS
Status: DISCONTINUED | OUTPATIENT
Start: 2019-01-01 | End: 2019-01-01 | Stop reason: HOSPADM

## 2019-01-01 RX ORDER — AMOXICILLIN 250 MG
1 CAPSULE ORAL 2 TIMES DAILY
Status: DISCONTINUED | OUTPATIENT
Start: 2019-01-01 | End: 2019-01-01 | Stop reason: HOSPADM

## 2019-01-01 RX ORDER — ACETAMINOPHEN 500 MG
1000 TABLET ORAL ONCE
Status: COMPLETED | OUTPATIENT
Start: 2019-01-01 | End: 2019-01-01

## 2019-01-01 RX ORDER — LABETALOL HCL 20 MG/4 ML
10 SYRINGE (ML) INTRAVENOUS ONCE
Status: DISCONTINUED | OUTPATIENT
Start: 2019-01-01 | End: 2019-01-01

## 2019-01-01 RX ORDER — SODIUM CHLORIDE 9 MG/ML
INJECTION, SOLUTION INTRAVENOUS
Status: DISCONTINUED | OUTPATIENT
Start: 2019-01-01 | End: 2019-01-01

## 2019-01-01 RX ORDER — CALCITRIOL 1 UG/ML
1 SOLUTION ORAL 2 TIMES DAILY
Status: DISCONTINUED | OUTPATIENT
Start: 2019-01-01 | End: 2019-01-01

## 2019-01-01 RX ORDER — EPHEDRINE SULFATE 50 MG/ML
INJECTION, SOLUTION INTRAVENOUS
Status: DISCONTINUED | OUTPATIENT
Start: 2019-01-01 | End: 2019-01-01

## 2019-01-01 RX ORDER — PANTOPRAZOLE SODIUM 40 MG/1
40 TABLET, DELAYED RELEASE ORAL DAILY
Status: DISCONTINUED | OUTPATIENT
Start: 2019-01-01 | End: 2019-01-01

## 2019-01-01 RX ORDER — ACETAMINOPHEN 325 MG/1
650 TABLET ORAL EVERY 6 HOURS PRN
Status: DISCONTINUED | OUTPATIENT
Start: 2019-01-01 | End: 2019-01-01 | Stop reason: HOSPADM

## 2019-01-01 RX ORDER — LABETALOL 100 MG/1
300 TABLET, FILM COATED ORAL
Status: COMPLETED | OUTPATIENT
Start: 2019-01-01 | End: 2019-01-01

## 2019-01-01 RX ORDER — LIDOCAINE HYDROCHLORIDE 10 MG/ML
1 INJECTION, SOLUTION EPIDURAL; INFILTRATION; INTRACAUDAL; PERINEURAL ONCE
Status: COMPLETED | OUTPATIENT
Start: 2019-01-01 | End: 2019-01-01

## 2019-01-01 RX ORDER — LORAZEPAM 2 MG/ML
INJECTION INTRAMUSCULAR
Status: DISCONTINUED
Start: 2019-01-01 | End: 2019-01-01 | Stop reason: WASHOUT

## 2019-01-01 RX ORDER — TACROLIMUS 5 MG/1
5 CAPSULE ORAL 2 TIMES DAILY
Status: DISCONTINUED | OUTPATIENT
Start: 2019-01-01 | End: 2019-01-01 | Stop reason: HOSPADM

## 2019-01-01 RX ORDER — LACOSAMIDE 50 MG/1
50 TABLET ORAL 2 TIMES DAILY
Qty: 60 TABLET | Refills: 11 | Status: ON HOLD | OUTPATIENT
Start: 2019-01-01 | End: 2019-01-01

## 2019-01-01 RX ORDER — PROPOFOL 10 MG/ML
5 INJECTION, EMULSION INTRAVENOUS CONTINUOUS
Status: DISCONTINUED | OUTPATIENT
Start: 2019-01-01 | End: 2019-01-01

## 2019-01-01 RX ORDER — HYDRALAZINE HYDROCHLORIDE 100 MG/1
100 TABLET, FILM COATED ORAL EVERY 8 HOURS
Qty: 90 TABLET | Refills: 11 | Status: SHIPPED | OUTPATIENT
Start: 2019-01-01 | End: 2020-07-29

## 2019-01-01 RX ORDER — VERAPAMIL HYDROCHLORIDE 120 MG/1
240 TABLET, FILM COATED, EXTENDED RELEASE ORAL DAILY
Status: DISCONTINUED | OUTPATIENT
Start: 2019-01-01 | End: 2019-01-01

## 2019-01-01 RX ORDER — TACROLIMUS 5 MG/1
5 CAPSULE ORAL EVERY MORNING
Status: DISCONTINUED | OUTPATIENT
Start: 2019-01-01 | End: 2019-01-01 | Stop reason: HOSPADM

## 2019-01-01 RX ORDER — IBUPROFEN 200 MG
16 TABLET ORAL
Status: DISCONTINUED | OUTPATIENT
Start: 2019-01-01 | End: 2019-01-01

## 2019-01-01 RX ORDER — ETOMIDATE 2 MG/ML
INJECTION INTRAVENOUS
Status: COMPLETED
Start: 2019-01-01 | End: 2019-01-01

## 2019-01-01 RX ORDER — ACETAMINOPHEN 10 MG/ML
1000 INJECTION, SOLUTION INTRAVENOUS ONCE
Status: COMPLETED | OUTPATIENT
Start: 2019-01-01 | End: 2019-01-01

## 2019-01-01 RX ORDER — MUPIROCIN 20 MG/G
1 OINTMENT TOPICAL 2 TIMES DAILY
Status: COMPLETED | OUTPATIENT
Start: 2019-01-01 | End: 2019-01-01

## 2019-01-01 RX ORDER — PROPOFOL 10 MG/ML
VIAL (ML) INTRAVENOUS CONTINUOUS PRN
Status: DISCONTINUED | OUTPATIENT
Start: 2019-01-01 | End: 2019-01-01

## 2019-01-01 RX ORDER — ETOMIDATE 2 MG/ML
14 INJECTION INTRAVENOUS ONCE
Status: COMPLETED | OUTPATIENT
Start: 2019-01-01 | End: 2019-01-01

## 2019-01-01 RX ORDER — BENZONATATE 100 MG/1
100 CAPSULE ORAL 3 TIMES DAILY PRN
Qty: 20 CAPSULE | Refills: 0 | Status: SHIPPED | OUTPATIENT
Start: 2019-01-01 | End: 2019-01-01

## 2019-01-01 RX ORDER — HYDRALAZINE HYDROCHLORIDE 20 MG/ML
10 INJECTION INTRAMUSCULAR; INTRAVENOUS EVERY 4 HOURS PRN
Status: DISCONTINUED | OUTPATIENT
Start: 2019-01-01 | End: 2019-01-01 | Stop reason: HOSPADM

## 2019-01-01 RX ORDER — INSULIN ASPART 100 [IU]/ML
1-10 INJECTION, SOLUTION INTRAVENOUS; SUBCUTANEOUS EVERY 6 HOURS PRN
Status: DISCONTINUED | OUTPATIENT
Start: 2019-01-01 | End: 2019-01-01

## 2019-01-01 RX ORDER — SEVELAMER HYDROCHLORIDE 800 MG/1
800 TABLET, FILM COATED ORAL
COMMUNITY

## 2019-01-01 RX ORDER — SODIUM,POTASSIUM PHOSPHATES 280-250MG
1 POWDER IN PACKET (EA) ORAL EVERY 4 HOURS
Status: COMPLETED | OUTPATIENT
Start: 2019-01-01 | End: 2019-01-01

## 2019-01-01 RX ORDER — MINOXIDIL 2.5 MG/1
5 TABLET ORAL 2 TIMES DAILY
Status: DISCONTINUED | OUTPATIENT
Start: 2019-01-01 | End: 2019-01-01

## 2019-01-01 RX ORDER — ISOSORBIDE MONONITRATE 30 MG/1
30 TABLET, EXTENDED RELEASE ORAL
Status: COMPLETED | OUTPATIENT
Start: 2019-01-01 | End: 2019-01-01

## 2019-01-01 RX ORDER — HYDRALAZINE HYDROCHLORIDE 50 MG/1
100 TABLET, FILM COATED ORAL EVERY 8 HOURS
Status: DISCONTINUED | OUTPATIENT
Start: 2019-01-01 | End: 2019-01-01

## 2019-01-01 RX ORDER — CALCITRIOL 0.5 UG/1
0.5 CAPSULE ORAL DAILY
Qty: 30 CAPSULE | Refills: 5 | Status: ON HOLD | OUTPATIENT
Start: 2019-01-01 | End: 2019-01-01 | Stop reason: HOSPADM

## 2019-01-01 RX ORDER — DEXAMETHASONE SODIUM PHOSPHATE 4 MG/ML
4 INJECTION, SOLUTION INTRA-ARTICULAR; INTRALESIONAL; INTRAMUSCULAR; INTRAVENOUS; SOFT TISSUE EVERY 6 HOURS
Status: COMPLETED | OUTPATIENT
Start: 2019-01-01 | End: 2019-01-01

## 2019-01-01 RX ORDER — SODIUM POLYSTYRENE SULFONATE 4.1 MEQ/G
30 POWDER, FOR SUSPENSION ORAL; RECTAL
Status: COMPLETED | OUTPATIENT
Start: 2019-01-01 | End: 2019-01-01

## 2019-01-01 RX ORDER — HYDRALAZINE HYDROCHLORIDE 20 MG/ML
INJECTION INTRAMUSCULAR; INTRAVENOUS
Status: DISPENSED
Start: 2019-01-01 | End: 2019-01-01

## 2019-01-01 RX ORDER — ISOSORBIDE MONONITRATE 30 MG/1
30 TABLET, EXTENDED RELEASE ORAL ONCE
Status: COMPLETED | OUTPATIENT
Start: 2019-01-01 | End: 2019-01-01

## 2019-01-01 RX ORDER — LABETALOL HCL 20 MG/4 ML
20 SYRINGE (ML) INTRAVENOUS ONCE
Status: COMPLETED | OUTPATIENT
Start: 2019-01-01 | End: 2019-01-01

## 2019-01-01 RX ORDER — TACROLIMUS 5 MG/1
5 CAPSULE ORAL EVERY 12 HOURS
Qty: 60 CAPSULE | Refills: 11 | Status: CANCELLED | OUTPATIENT
Start: 2019-01-01 | End: 2020-09-11

## 2019-01-01 RX ORDER — LIDOCAINE HYDROCHLORIDE AND EPINEPHRINE 10; 10 MG/ML; UG/ML
INJECTION, SOLUTION INFILTRATION; PERINEURAL
Status: DISCONTINUED | OUTPATIENT
Start: 2019-01-01 | End: 2019-01-01 | Stop reason: HOSPADM

## 2019-01-01 RX ORDER — OXYCODONE HYDROCHLORIDE 5 MG/1
5 TABLET ORAL EVERY 6 HOURS PRN
Status: CANCELLED | OUTPATIENT
Start: 2019-01-01

## 2019-01-01 RX ORDER — FAMOTIDINE 40 MG/1
40 TABLET, FILM COATED ORAL DAILY
Qty: 30 TABLET | Refills: 11 | Status: ON HOLD | OUTPATIENT
Start: 2019-01-01 | End: 2019-01-01 | Stop reason: SDUPTHER

## 2019-01-01 RX ORDER — TACROLIMUS 1 MG/1
4 CAPSULE ORAL EVERY EVENING
Status: DISCONTINUED | OUTPATIENT
Start: 2019-01-01 | End: 2019-01-01 | Stop reason: HOSPADM

## 2019-01-01 RX ORDER — CALCITRIOL 0.5 UG/1
CAPSULE ORAL
Refills: 0 | Status: ON HOLD | COMMUNITY
Start: 2019-01-01 | End: 2019-01-01 | Stop reason: HOSPADM

## 2019-01-01 RX ORDER — SODIUM,POTASSIUM PHOSPHATES 280-250MG
2 POWDER IN PACKET (EA) ORAL EVERY 4 HOURS
Status: DISCONTINUED | OUTPATIENT
Start: 2019-01-01 | End: 2019-01-01 | Stop reason: HOSPADM

## 2019-01-01 RX ORDER — LEVETIRACETAM 500 MG/1
500 TABLET ORAL ONCE
Status: COMPLETED | OUTPATIENT
Start: 2019-01-01 | End: 2019-01-01

## 2019-01-01 RX ORDER — VANCOMYCIN HYDROCHLORIDE 1 G/20ML
INJECTION, POWDER, LYOPHILIZED, FOR SOLUTION INTRAVENOUS
Status: DISCONTINUED | OUTPATIENT
Start: 2019-01-01 | End: 2019-01-01 | Stop reason: HOSPADM

## 2019-01-01 RX ORDER — CALCITRIOL 0.5 UG/1
1 CAPSULE ORAL 2 TIMES DAILY
Qty: 30 CAPSULE | Refills: 2 | Status: CANCELLED | OUTPATIENT
Start: 2019-01-01

## 2019-01-01 RX ORDER — CALCIUM CARBONATE 500(1250)
2000 TABLET ORAL 4 TIMES DAILY
Status: DISCONTINUED | OUTPATIENT
Start: 2019-01-01 | End: 2019-01-01

## 2019-01-01 RX ORDER — HYDRALAZINE HYDROCHLORIDE 20 MG/ML
5 INJECTION INTRAMUSCULAR; INTRAVENOUS
Status: COMPLETED | OUTPATIENT
Start: 2019-01-01 | End: 2019-01-01

## 2019-01-01 RX ORDER — SODIUM POLYSTYRENE SULFONATE 4.1 MEQ/G
30 POWDER, FOR SUSPENSION ORAL; RECTAL EVERY 4 HOURS
Status: DISCONTINUED | OUTPATIENT
Start: 2019-01-01 | End: 2019-01-01

## 2019-01-01 RX ORDER — MANNITOL 250 MG/ML
50 INJECTION, SOLUTION INTRAVENOUS ONCE
Status: DISCONTINUED | OUTPATIENT
Start: 2019-01-01 | End: 2019-01-01

## 2019-01-01 RX ORDER — LEVETIRACETAM 500 MG/1
500 TABLET ORAL
Qty: 12 TABLET | Refills: 11 | Status: ON HOLD | OUTPATIENT
Start: 2019-01-01 | End: 2019-01-01

## 2019-01-01 RX ORDER — ONDANSETRON 4 MG/1
4 TABLET, ORALLY DISINTEGRATING ORAL EVERY 6 HOURS PRN
Qty: 40 TABLET | Refills: 3 | Status: SHIPPED | OUTPATIENT
Start: 2019-01-01

## 2019-01-01 RX ORDER — CLONIDINE HYDROCHLORIDE 0.3 MG/1
0.3 TABLET ORAL 2 TIMES DAILY
Qty: 60 TABLET | Refills: 1 | OUTPATIENT
Start: 2019-01-01 | End: 2019-01-01 | Stop reason: SDUPTHER

## 2019-01-01 RX ORDER — TACROLIMUS 5 MG/1
5 CAPSULE ORAL EVERY EVENING
Status: DISCONTINUED | OUTPATIENT
Start: 2019-01-01 | End: 2019-01-01

## 2019-01-01 RX ORDER — LABETALOL 200 MG/1
200 TABLET, FILM COATED ORAL EVERY 12 HOURS
Status: DISCONTINUED | OUTPATIENT
Start: 2019-01-01 | End: 2019-01-01

## 2019-01-01 RX ORDER — CEFAZOLIN SODIUM 1 G/3ML
2 INJECTION, POWDER, FOR SOLUTION INTRAMUSCULAR; INTRAVENOUS
Status: COMPLETED | OUTPATIENT
Start: 2019-01-01 | End: 2019-01-01

## 2019-01-01 RX ORDER — CALCITRIOL 0.5 UG/1
1 CAPSULE ORAL 2 TIMES DAILY
Qty: 120 CAPSULE | Refills: 11 | Status: SHIPPED | OUTPATIENT
Start: 2019-01-01

## 2019-01-01 RX ORDER — CALCITRIOL 0.25 UG/1
0.5 CAPSULE ORAL 2 TIMES DAILY
Status: DISCONTINUED | OUTPATIENT
Start: 2019-01-01 | End: 2019-01-01

## 2019-01-01 RX ORDER — LABETALOL HCL 20 MG/4 ML
10 SYRINGE (ML) INTRAVENOUS EVERY 4 HOURS PRN
Status: DISCONTINUED | OUTPATIENT
Start: 2019-01-01 | End: 2019-01-01

## 2019-01-01 RX ORDER — IBUPROFEN 200 MG
24 TABLET ORAL
Status: DISCONTINUED | OUTPATIENT
Start: 2019-01-01 | End: 2019-01-01

## 2019-01-01 RX ORDER — ISOSORBIDE MONONITRATE 30 MG/1
30 TABLET, EXTENDED RELEASE ORAL DAILY
Status: DISCONTINUED | OUTPATIENT
Start: 2019-01-01 | End: 2019-01-01

## 2019-01-01 RX ORDER — CLONIDINE HYDROCHLORIDE 0.3 MG/1
TABLET ORAL
Qty: 60 TABLET | Refills: 1 | Status: SHIPPED | OUTPATIENT
Start: 2019-01-01

## 2019-01-01 RX ORDER — BACITRACIN 500 [USP'U]/G
OINTMENT TOPICAL 3 TIMES DAILY
Status: DISCONTINUED | OUTPATIENT
Start: 2019-01-01 | End: 2019-01-01

## 2019-01-01 RX ORDER — ACETAMINOPHEN 10 MG/ML
400 INJECTION, SOLUTION INTRAVENOUS ONCE
Status: DISCONTINUED | OUTPATIENT
Start: 2019-01-01 | End: 2019-01-01

## 2019-01-01 RX ORDER — CEFAZOLIN SODIUM 1 G/3ML
2 INJECTION, POWDER, FOR SOLUTION INTRAMUSCULAR; INTRAVENOUS EVERY 24 HOURS
Status: DISCONTINUED | OUTPATIENT
Start: 2019-01-01 | End: 2019-01-01

## 2019-01-01 RX ORDER — ISOSORBIDE MONONITRATE 30 MG/1
30 TABLET, EXTENDED RELEASE ORAL DAILY
Qty: 30 TABLET | Refills: 11 | Status: SHIPPED | OUTPATIENT
Start: 2019-01-01 | End: 2020-05-28

## 2019-01-01 RX ORDER — SODIUM POLYSTYRENE SULFONATE 4.1 MEQ/G
30 POWDER, FOR SUSPENSION ORAL; RECTAL ONCE
Status: COMPLETED | OUTPATIENT
Start: 2019-01-01 | End: 2019-01-01

## 2019-01-01 RX ORDER — MINOXIDIL 2.5 MG/1
5 TABLET ORAL 2 TIMES DAILY
Qty: 120 TABLET | Refills: 3 | Status: ON HOLD | OUTPATIENT
Start: 2019-01-01 | End: 2019-01-01 | Stop reason: HOSPADM

## 2019-01-01 RX ORDER — VERAPAMIL HYDROCHLORIDE 120 MG/1
240 TABLET, FILM COATED, EXTENDED RELEASE ORAL DAILY
Status: CANCELLED | OUTPATIENT
Start: 2019-01-01

## 2019-01-01 RX ORDER — HYDRALAZINE HYDROCHLORIDE 20 MG/ML
20 INJECTION INTRAMUSCULAR; INTRAVENOUS
Status: COMPLETED | OUTPATIENT
Start: 2019-01-01 | End: 2019-01-01

## 2019-01-01 RX ORDER — ISOSORBIDE MONONITRATE 30 MG/1
90 TABLET, EXTENDED RELEASE ORAL DAILY
Status: CANCELLED | OUTPATIENT
Start: 2019-01-01

## 2019-01-01 RX ORDER — TACROLIMUS 1 MG/1
6 CAPSULE ORAL 2 TIMES DAILY
Status: DISCONTINUED | OUTPATIENT
Start: 2019-01-01 | End: 2019-01-01 | Stop reason: HOSPADM

## 2019-01-01 RX ORDER — IRBESARTAN 300 MG/1
300 TABLET ORAL EVERY MORNING
Qty: 30 TABLET | Refills: 3 | Status: SHIPPED | OUTPATIENT
Start: 2019-01-01

## 2019-01-01 RX ORDER — PROMETHAZINE HYDROCHLORIDE 12.5 MG/1
12.5 TABLET ORAL EVERY 6 HOURS PRN
Status: DISCONTINUED | OUTPATIENT
Start: 2019-01-01 | End: 2019-01-01 | Stop reason: HOSPADM

## 2019-01-01 RX ORDER — LACOSAMIDE 50 MG/1
50 TABLET ORAL EVERY 12 HOURS
Qty: 60 TABLET | Refills: 2 | Status: SHIPPED | OUTPATIENT
Start: 2019-01-01 | End: 2019-09-19

## 2019-01-01 RX ORDER — VERAPAMIL HYDROCHLORIDE 240 MG/1
240 TABLET, FILM COATED, EXTENDED RELEASE ORAL NIGHTLY
Qty: 90 TABLET | Refills: 3 | Status: SHIPPED | OUTPATIENT
Start: 2019-01-01 | End: 2019-01-01

## 2019-01-01 RX ORDER — HYDRALAZINE HYDROCHLORIDE 25 MG/1
25 TABLET, FILM COATED ORAL ONCE
Status: COMPLETED | OUTPATIENT
Start: 2019-01-01 | End: 2019-01-01

## 2019-01-01 RX ORDER — TRAMADOL HYDROCHLORIDE 50 MG/1
50 TABLET ORAL EVERY 6 HOURS PRN
Qty: 12 TABLET | Refills: 0 | Status: SHIPPED | OUTPATIENT
Start: 2019-01-01 | End: 2019-01-01

## 2019-01-01 RX ORDER — IRBESARTAN 150 MG/1
300 TABLET ORAL DAILY
Status: CANCELLED | OUTPATIENT
Start: 2019-01-01

## 2019-01-01 RX ORDER — VERAPAMIL HYDROCHLORIDE 120 MG/1
240 TABLET, FILM COATED, EXTENDED RELEASE ORAL NIGHTLY
Status: DISCONTINUED | OUTPATIENT
Start: 2019-01-01 | End: 2019-01-01 | Stop reason: HOSPADM

## 2019-01-01 RX ORDER — NIFEDIPINE 30 MG/1
30 TABLET, EXTENDED RELEASE ORAL ONCE
Status: COMPLETED | OUTPATIENT
Start: 2019-01-01 | End: 2019-01-01

## 2019-01-01 RX ORDER — LABETALOL HYDROCHLORIDE 5 MG/ML
INJECTION, SOLUTION INTRAVENOUS
Status: DISCONTINUED | OUTPATIENT
Start: 2019-01-01 | End: 2019-01-01

## 2019-01-01 RX ORDER — MIDAZOLAM HYDROCHLORIDE 5 MG/ML
INJECTION INTRAMUSCULAR; INTRAVENOUS
Status: DISCONTINUED | OUTPATIENT
Start: 2019-01-01 | End: 2019-01-01

## 2019-01-01 RX ORDER — BISACODYL 10 MG
10 SUPPOSITORY, RECTAL RECTAL DAILY
Refills: 0 | COMMUNITY
Start: 2019-01-01

## 2019-01-01 RX ORDER — ACETAMINOPHEN 10 MG/ML
INJECTION, SOLUTION INTRAVENOUS
Status: DISPENSED
Start: 2019-01-01 | End: 2019-01-01

## 2019-01-01 RX ORDER — OXYCODONE HYDROCHLORIDE 5 MG/1
5 TABLET ORAL EVERY 6 HOURS PRN
Status: DISCONTINUED | OUTPATIENT
Start: 2019-01-01 | End: 2019-01-01 | Stop reason: HOSPADM

## 2019-01-01 RX ORDER — CARVEDILOL 25 MG/1
25 TABLET ORAL 2 TIMES DAILY
Qty: 120 TABLET | Refills: 3 | Status: ON HOLD | OUTPATIENT
Start: 2019-01-01 | End: 2019-01-01 | Stop reason: SDUPTHER

## 2019-01-01 RX ORDER — SPIRONOLACTONE 25 MG/1
25 TABLET ORAL DAILY
Status: DISCONTINUED | OUTPATIENT
Start: 2019-01-01 | End: 2019-01-01

## 2019-01-01 RX ORDER — CALCITRIOL 0.5 UG/1
1 CAPSULE ORAL 2 TIMES DAILY
Qty: 60 CAPSULE | Refills: 1 | Status: ON HOLD | OUTPATIENT
Start: 2019-01-01 | End: 2019-01-01 | Stop reason: HOSPADM

## 2019-01-01 RX ORDER — SEVELAMER CARBONATE 800 MG/1
800 TABLET, FILM COATED ORAL
Qty: 90 TABLET | Refills: 11 | Status: ON HOLD | OUTPATIENT
Start: 2019-01-01 | End: 2019-01-01 | Stop reason: HOSPADM

## 2019-01-01 RX ORDER — TACROLIMUS 1 MG/1
4 CAPSULE ORAL EVERY EVENING
Status: DISCONTINUED | OUTPATIENT
Start: 2019-01-01 | End: 2019-01-01

## 2019-01-01 RX ORDER — CARVEDILOL 12.5 MG/1
25 TABLET ORAL
Status: COMPLETED | OUTPATIENT
Start: 2019-01-01 | End: 2019-01-01

## 2019-01-01 RX ORDER — HYDRALAZINE HYDROCHLORIDE 100 MG/1
TABLET, FILM COATED ORAL
Qty: 60 TABLET | Refills: 3 | Status: SHIPPED | OUTPATIENT
Start: 2019-01-01 | End: 2019-01-01 | Stop reason: HOSPADM

## 2019-01-01 RX ORDER — CLONIDINE HYDROCHLORIDE 0.1 MG/1
0.2 TABLET ORAL
Status: COMPLETED | OUTPATIENT
Start: 2019-01-01 | End: 2019-01-01

## 2019-01-01 RX ORDER — MINOXIDIL 10 MG/1
10 TABLET ORAL DAILY
Qty: 90 TABLET | Refills: 3 | Status: SHIPPED | OUTPATIENT
Start: 2019-01-01 | End: 2020-08-11

## 2019-01-01 RX ORDER — SODIUM CHLORIDE 0.9 % (FLUSH) 0.9 %
5 SYRINGE (ML) INJECTION
Status: DISCONTINUED | OUTPATIENT
Start: 2019-01-01 | End: 2019-01-01

## 2019-01-01 RX ORDER — TACROLIMUS 1 MG/1
3 CAPSULE ORAL EVERY EVENING
Status: DISCONTINUED | OUTPATIENT
Start: 2019-01-01 | End: 2019-01-01

## 2019-01-01 RX ORDER — CLONIDINE HYDROCHLORIDE 0.2 MG/1
0.2 TABLET ORAL EVERY 8 HOURS
Status: DISCONTINUED | OUTPATIENT
Start: 2019-01-01 | End: 2019-01-01

## 2019-01-01 RX ORDER — CLONIDINE HYDROCHLORIDE 0.3 MG/1
0.3 TABLET ORAL EVERY 8 HOURS
Status: DISCONTINUED | OUTPATIENT
Start: 2019-01-01 | End: 2019-01-01 | Stop reason: HOSPADM

## 2019-01-01 RX ORDER — FAMOTIDINE 40 MG/1
40 TABLET, FILM COATED ORAL EVERY MORNING
Qty: 30 TABLET | Refills: 11 | Status: ON HOLD | OUTPATIENT
Start: 2019-01-01 | End: 2019-01-01 | Stop reason: HOSPADM

## 2019-01-01 RX ORDER — SODIUM CHLORIDE 9 MG/ML
100 INJECTION, SOLUTION INTRAVENOUS ONCE
Status: COMPLETED | OUTPATIENT
Start: 2019-01-01 | End: 2019-01-01

## 2019-01-01 RX ORDER — CHLORHEXIDINE GLUCONATE ORAL RINSE 1.2 MG/ML
15 SOLUTION DENTAL 2 TIMES DAILY
Status: DISCONTINUED | OUTPATIENT
Start: 2019-01-01 | End: 2019-01-01

## 2019-01-01 RX ORDER — CLONIDINE HYDROCHLORIDE 0.1 MG/1
0.1 TABLET ORAL 3 TIMES DAILY
Status: DISCONTINUED | OUTPATIENT
Start: 2019-01-01 | End: 2019-01-01

## 2019-01-01 RX ORDER — MUPIROCIN 20 MG/G
OINTMENT TOPICAL
Status: DISCONTINUED | OUTPATIENT
Start: 2019-01-01 | End: 2019-01-01 | Stop reason: HOSPADM

## 2019-01-01 RX ORDER — HYDRALAZINE HYDROCHLORIDE 100 MG/1
100 TABLET, FILM COATED ORAL EVERY 8 HOURS
Qty: 90 TABLET | Refills: 11 | Status: SHIPPED | OUTPATIENT
Start: 2019-01-01 | End: 2019-01-01 | Stop reason: SDUPTHER

## 2019-01-01 RX ADMIN — HYDRALAZINE HYDROCHLORIDE 50 MG: 50 TABLET ORAL at 05:04

## 2019-01-01 RX ADMIN — CARVEDILOL 25 MG: 25 TABLET, FILM COATED ORAL at 09:05

## 2019-01-01 RX ADMIN — HYDRALAZINE HYDROCHLORIDE 50 MG: 25 TABLET, FILM COATED ORAL at 03:03

## 2019-01-01 RX ADMIN — SODIUM CHLORIDE 125 ML: 0.9 INJECTION, SOLUTION INTRAVENOUS at 08:03

## 2019-01-01 RX ADMIN — TACROLIMUS 4 MG: 1 CAPSULE ORAL at 09:05

## 2019-01-01 RX ADMIN — PANTOPRAZOLE SODIUM 40 MG: 40 TABLET, DELAYED RELEASE ORAL at 10:09

## 2019-01-01 RX ADMIN — HYDRALAZINE HYDROCHLORIDE 10 MG: 20 INJECTION INTRAMUSCULAR; INTRAVENOUS at 11:05

## 2019-01-01 RX ADMIN — HYDRALAZINE HYDROCHLORIDE 10 MG: 20 INJECTION INTRAMUSCULAR; INTRAVENOUS at 10:05

## 2019-01-01 RX ADMIN — SODIUM CHLORIDE, SODIUM GLUCONATE, SODIUM ACETATE, POTASSIUM CHLORIDE, MAGNESIUM CHLORIDE, SODIUM PHOSPHATE, DIBASIC, AND POTASSIUM PHOSPHATE: .53; .5; .37; .037; .03; .012; .00082 INJECTION, SOLUTION INTRAVENOUS at 05:04

## 2019-01-01 RX ADMIN — LEVETIRACETAM 500 MG: 500 TABLET ORAL at 10:04

## 2019-01-01 RX ADMIN — ISOSORBIDE MONONITRATE 60 MG: 60 TABLET, EXTENDED RELEASE ORAL at 09:05

## 2019-01-01 RX ADMIN — SENNOSIDES AND DOCUSATE SODIUM 2 TABLET: 8.6; 5 TABLET ORAL at 08:04

## 2019-01-01 RX ADMIN — SODIUM CHLORIDE TAB 1 GM 2 G: 1 TAB at 09:05

## 2019-01-01 RX ADMIN — FAMOTIDINE 40 MG: 20 TABLET ORAL at 10:04

## 2019-01-01 RX ADMIN — INSULIN HUMAN 5 UNITS: 100 INJECTION, SOLUTION PARENTERAL at 10:04

## 2019-01-01 RX ADMIN — CALCITRIOL 1 MCG: 1 SOLUTION ORAL at 09:05

## 2019-01-01 RX ADMIN — SODIUM CHLORIDE TAB 1 GM 2 G: 1 TAB at 09:09

## 2019-01-01 RX ADMIN — LACOSAMIDE 50 MG: 50 TABLET, FILM COATED ORAL at 08:04

## 2019-01-01 RX ADMIN — HYDRALAZINE HYDROCHLORIDE 10 MG: 20 INJECTION INTRAMUSCULAR; INTRAVENOUS at 08:05

## 2019-01-01 RX ADMIN — VERAPAMIL HYDROCHLORIDE 240 MG: 240 TABLET, FILM COATED, EXTENDED RELEASE ORAL at 06:07

## 2019-01-01 RX ADMIN — ONDANSETRON 4 MG: 4 TABLET, ORALLY DISINTEGRATING ORAL at 02:05

## 2019-01-01 RX ADMIN — VERAPAMIL HYDROCHLORIDE 240 MG: 120 TABLET, FILM COATED, EXTENDED RELEASE ORAL at 08:05

## 2019-01-01 RX ADMIN — LEVETIRACETAM 500 MG: 500 TABLET ORAL at 12:05

## 2019-01-01 RX ADMIN — IRBESARTAN 300 MG: 150 TABLET ORAL at 08:05

## 2019-01-01 RX ADMIN — CARVEDILOL 25 MG: 25 TABLET, FILM COATED ORAL at 08:04

## 2019-01-01 RX ADMIN — LEVETIRACETAM 500 MG: 500 TABLET ORAL at 08:05

## 2019-01-01 RX ADMIN — LACOSAMIDE 50 MG: 50 TABLET, FILM COATED ORAL at 10:09

## 2019-01-01 RX ADMIN — METOPROLOL TARTRATE 2 MG: 5 INJECTION, SOLUTION INTRAVENOUS at 07:04

## 2019-01-01 RX ADMIN — TACROLIMUS 4 MG: 1 CAPSULE ORAL at 08:05

## 2019-01-01 RX ADMIN — SODIUM CHLORIDE 350 ML: 0.9 INJECTION, SOLUTION INTRAVENOUS at 10:03

## 2019-01-01 RX ADMIN — ERGOCALCIFEROL 50000 UNITS: 1.25 CAPSULE ORAL at 09:03

## 2019-01-01 RX ADMIN — INSULIN ASPART 1 UNITS: 100 INJECTION, SOLUTION INTRAVENOUS; SUBCUTANEOUS at 11:04

## 2019-01-01 RX ADMIN — HYDRALAZINE HYDROCHLORIDE 100 MG: 50 TABLET ORAL at 05:05

## 2019-01-01 RX ADMIN — LEVETIRACETAM 500 MG: 500 TABLET ORAL at 12:03

## 2019-01-01 RX ADMIN — CALCIUM CARBONATE 2000 MG: 1250 SUSPENSION ORAL at 09:05

## 2019-01-01 RX ADMIN — CARVEDILOL 25 MG: 25 TABLET, FILM COATED ORAL at 08:05

## 2019-01-01 RX ADMIN — LACOSAMIDE 50 MG: 50 TABLET, FILM COATED ORAL at 08:05

## 2019-01-01 RX ADMIN — SENNOSIDES AND DOCUSATE SODIUM 2 TABLET: 8.6; 5 TABLET ORAL at 08:05

## 2019-01-01 RX ADMIN — CALCIUM CARBONATE 2000 MG: 1250 SUSPENSION ORAL at 04:04

## 2019-01-01 RX ADMIN — DEXAMETHASONE 4 MG: 4 TABLET ORAL at 07:05

## 2019-01-01 RX ADMIN — LACOSAMIDE 50 MG: 50 TABLET, FILM COATED ORAL at 12:03

## 2019-01-01 RX ADMIN — ALUMINUM HYDROXIDE, MAGNESIUM HYDROXIDE, AND SIMETHICONE: 200; 200; 20 SUSPENSION ORAL at 02:05

## 2019-01-01 RX ADMIN — LACOSAMIDE 50 MG: 50 TABLET, FILM COATED ORAL at 10:05

## 2019-01-01 RX ADMIN — LACOSAMIDE 50 MG: 50 TABLET, FILM COATED ORAL at 09:05

## 2019-01-01 RX ADMIN — CALCIUM CARBONATE 2000 MG: 1250 SUSPENSION ORAL at 02:04

## 2019-01-01 RX ADMIN — FENTANYL CITRATE 50 MCG: 50 INJECTION, SOLUTION INTRAMUSCULAR; INTRAVENOUS at 08:03

## 2019-01-01 RX ADMIN — ACETAMINOPHEN 400 MG: 10 INJECTION, SOLUTION INTRAVENOUS at 09:04

## 2019-01-01 RX ADMIN — HYDRALAZINE HYDROCHLORIDE 100 MG: 50 TABLET ORAL at 09:04

## 2019-01-01 RX ADMIN — CALCIUM GLUCONATE 1000 MG: 98 INJECTION, SOLUTION INTRAVENOUS at 10:04

## 2019-01-01 RX ADMIN — HYDRALAZINE HYDROCHLORIDE 10 MG: 20 INJECTION INTRAMUSCULAR; INTRAVENOUS at 04:05

## 2019-01-01 RX ADMIN — SODIUM CHLORIDE 300 ML: 0.9 INJECTION, SOLUTION INTRAVENOUS at 07:04

## 2019-01-01 RX ADMIN — CALCITRIOL 1 MCG: 0.25 CAPSULE ORAL at 12:03

## 2019-01-01 RX ADMIN — SODIUM CHLORIDE TAB 1 GM 2 G: 1 TAB at 08:05

## 2019-01-01 RX ADMIN — Medication 10 MG: at 02:05

## 2019-01-01 RX ADMIN — INSULIN ASPART 2 UNITS: 100 INJECTION, SOLUTION INTRAVENOUS; SUBCUTANEOUS at 06:04

## 2019-01-01 RX ADMIN — IRBESARTAN 300 MG: 150 TABLET, FILM COATED ORAL at 06:05

## 2019-01-01 RX ADMIN — CALCIUM 2000 MG: 500 TABLET ORAL at 01:03

## 2019-01-01 RX ADMIN — FENTANYL CITRATE 100 MCG: 50 INJECTION, SOLUTION INTRAMUSCULAR; INTRAVENOUS at 07:04

## 2019-01-01 RX ADMIN — HYDRALAZINE HYDROCHLORIDE 5 MG: 20 INJECTION INTRAMUSCULAR; INTRAVENOUS at 11:03

## 2019-01-01 RX ADMIN — HYDRALAZINE HYDROCHLORIDE 25 MG: 25 TABLET, FILM COATED ORAL at 11:04

## 2019-01-01 RX ADMIN — ISOSORBIDE MONONITRATE 30 MG: 30 TABLET, EXTENDED RELEASE ORAL at 09:05

## 2019-01-01 RX ADMIN — CARVEDILOL 25 MG: 25 TABLET, FILM COATED ORAL at 09:03

## 2019-01-01 RX ADMIN — CALCIUM CARBONATE 2000 MG: 1250 SUSPENSION ORAL at 08:04

## 2019-01-01 RX ADMIN — CALCIUM 2000 MG: 500 TABLET ORAL at 09:03

## 2019-01-01 RX ADMIN — NITROGLYCERIN 0.5 INCH: 20 OINTMENT TOPICAL at 04:05

## 2019-01-01 RX ADMIN — MANNITOL 25 G: 20 INJECTION, SOLUTION INTRAVENOUS at 03:04

## 2019-01-01 RX ADMIN — INSULIN ASPART 2 UNITS: 100 INJECTION, SOLUTION INTRAVENOUS; SUBCUTANEOUS at 11:04

## 2019-01-01 RX ADMIN — LEVETIRACETAM 500 MG: 500 TABLET ORAL at 09:05

## 2019-01-01 RX ADMIN — CALCIUM CARBONATE 2000 MG: 1250 SUSPENSION ORAL at 02:05

## 2019-01-01 RX ADMIN — CLONIDINE HYDROCHLORIDE 0.2 MG: 0.1 TABLET ORAL at 01:05

## 2019-01-01 RX ADMIN — PHENYLEPHRINE HYDROCHLORIDE 100 MCG: 10 INJECTION INTRAVENOUS at 06:04

## 2019-01-01 RX ADMIN — CALCIUM CARBONATE (ANTACID) CHEW TAB 500 MG 1500 MG: 500 CHEW TAB at 10:03

## 2019-01-01 RX ADMIN — PROPOFOL 100 MG: 10 INJECTION, EMULSION INTRAVENOUS at 08:03

## 2019-01-01 RX ADMIN — HYDRALAZINE HYDROCHLORIDE 10 MG: 20 INJECTION INTRAMUSCULAR; INTRAVENOUS at 12:05

## 2019-01-01 RX ADMIN — LEVETIRACETAM 500 MG: 500 TABLET ORAL at 09:04

## 2019-01-01 RX ADMIN — CLONIDINE HYDROCHLORIDE 0.3 MG: 0.2 TABLET ORAL at 05:05

## 2019-01-01 RX ADMIN — CARVEDILOL 25 MG: 25 TABLET, FILM COATED ORAL at 10:04

## 2019-01-01 RX ADMIN — ROCURONIUM BROMIDE 50 MG: 10 INJECTION, SOLUTION INTRAVENOUS at 05:04

## 2019-01-01 RX ADMIN — LEVETIRACETAM 500 MG: 500 TABLET ORAL at 08:03

## 2019-01-01 RX ADMIN — NICARDIPINE HYDROCHLORIDE 0.4 MG: 2.5 INJECTION INTRAVENOUS at 08:04

## 2019-01-01 RX ADMIN — ACETAMINOPHEN AND CODEINE PHOSPHATE 1 TABLET: 300; 30 TABLET ORAL at 08:04

## 2019-01-01 RX ADMIN — HYDRALAZINE HYDROCHLORIDE 100 MG: 50 TABLET ORAL at 01:04

## 2019-01-01 RX ADMIN — CALCIUM 2000 MG: 500 TABLET ORAL at 06:04

## 2019-01-01 RX ADMIN — MIDAZOLAM HYDROCHLORIDE 2 MG: 1 INJECTION, SOLUTION INTRAMUSCULAR; INTRAVENOUS at 07:03

## 2019-01-01 RX ADMIN — ISOSORBIDE MONONITRATE 60 MG: 60 TABLET, EXTENDED RELEASE ORAL at 08:05

## 2019-01-01 RX ADMIN — LABETALOL HCL IV SOLN PREFILLED SYRINGE 20 MG/4ML (5 MG/ML) 10 MG: 20/4 SOLUTION PREFILLED SYRINGE at 01:05

## 2019-01-01 RX ADMIN — LABETALOL HCL IV SOLN PREFILLED SYRINGE 20 MG/4ML (5 MG/ML) 10 MG: 20/4 SOLUTION PREFILLED SYRINGE at 04:05

## 2019-01-01 RX ADMIN — ROCURONIUM BROMIDE 40 MG: 10 INJECTION, SOLUTION INTRAVENOUS at 07:04

## 2019-01-01 RX ADMIN — CALCIUM CARBONATE 2000 MG: 1250 SUSPENSION ORAL at 08:05

## 2019-01-01 RX ADMIN — HYDRALAZINE HYDROCHLORIDE 100 MG: 50 TABLET ORAL at 02:05

## 2019-01-01 RX ADMIN — LABETALOL HYDROCHLORIDE 10 MG: 5 INJECTION, SOLUTION INTRAVENOUS at 07:04

## 2019-01-01 RX ADMIN — CLONIDINE HYDROCHLORIDE 0.3 MG: 0.2 TABLET ORAL at 02:05

## 2019-01-01 RX ADMIN — IRBESARTAN 300 MG: 150 TABLET ORAL at 10:04

## 2019-01-01 RX ADMIN — ACETAMINOPHEN 650 MG: 325 TABLET ORAL at 08:03

## 2019-01-01 RX ADMIN — LEVETIRACETAM 500 MG: 500 TABLET ORAL at 05:05

## 2019-01-01 RX ADMIN — DEXAMETHASONE SODIUM PHOSPHATE 4 MG: 4 INJECTION, SOLUTION INTRAMUSCULAR; INTRAVENOUS at 05:04

## 2019-01-01 RX ADMIN — CLONIDINE HYDROCHLORIDE 0.3 MG: 0.2 TABLET ORAL at 10:09

## 2019-01-01 RX ADMIN — FAMOTIDINE 40 MG: 20 TABLET ORAL at 06:03

## 2019-01-01 RX ADMIN — OXYCODONE HYDROCHLORIDE 10 MG: 10 TABLET ORAL at 12:03

## 2019-01-01 RX ADMIN — LACOSAMIDE 50 MG: 50 TABLET, FILM COATED ORAL at 10:04

## 2019-01-01 RX ADMIN — Medication 3 MG: at 05:04

## 2019-01-01 RX ADMIN — CALCIUM GLUCONATE 2000 MG: 98 INJECTION, SOLUTION INTRAVENOUS at 05:04

## 2019-01-01 RX ADMIN — Medication 3 MG: at 08:05

## 2019-01-01 RX ADMIN — POTASSIUM & SODIUM PHOSPHATES POWDER PACK 280-160-250 MG 1 PACKET: 280-160-250 PACK at 02:05

## 2019-01-01 RX ADMIN — HYDRALAZINE HYDROCHLORIDE 100 MG: 50 TABLET ORAL at 06:05

## 2019-01-01 RX ADMIN — MUPIROCIN 1 G: 20 OINTMENT TOPICAL at 08:04

## 2019-01-01 RX ADMIN — CEFTRIAXONE SODIUM 2 G: 2 INJECTION, SOLUTION INTRAVENOUS at 07:04

## 2019-01-01 RX ADMIN — MUPIROCIN 1 G: 20 OINTMENT TOPICAL at 09:04

## 2019-01-01 RX ADMIN — FENTANYL CITRATE 12.5 MCG: 50 INJECTION, SOLUTION INTRAMUSCULAR; INTRAVENOUS at 04:04

## 2019-01-01 RX ADMIN — FENTANYL CITRATE 50 MCG: 50 INJECTION, SOLUTION INTRAMUSCULAR; INTRAVENOUS at 09:03

## 2019-01-01 RX ADMIN — PANTOPRAZOLE SODIUM 40 MG: 40 TABLET, DELAYED RELEASE ORAL at 09:09

## 2019-01-01 RX ADMIN — PHENYLEPHRINE HYDROCHLORIDE 100 MCG: 10 INJECTION INTRAVENOUS at 08:03

## 2019-01-01 RX ADMIN — POLYETHYLENE GLYCOL 3350 17 G: 17 POWDER, FOR SOLUTION ORAL at 09:05

## 2019-01-01 RX ADMIN — HYDRALAZINE HYDROCHLORIDE 100 MG: 50 TABLET ORAL at 09:05

## 2019-01-01 RX ADMIN — SODIUM CHLORIDE: 234 INJECTION INTRAMUSCULAR; INTRAVENOUS; SUBCUTANEOUS at 11:04

## 2019-01-01 RX ADMIN — CLONIDINE HYDROCHLORIDE 0.2 MG: 0.2 TABLET ORAL at 09:04

## 2019-01-01 RX ADMIN — CALCITRIOL 0.5 MCG: 0.25 CAPSULE ORAL at 09:03

## 2019-01-01 RX ADMIN — FAMOTIDINE 20 MG: 20 TABLET, FILM COATED ORAL at 08:05

## 2019-01-01 RX ADMIN — NICARDIPINE HYDROCHLORIDE 100 MCG: 2.5 INJECTION INTRAVENOUS at 07:04

## 2019-01-01 RX ADMIN — OXYCODONE HYDROCHLORIDE 5 MG: 5 TABLET ORAL at 04:03

## 2019-01-01 RX ADMIN — TACROLIMUS 5 MG: 5 CAPSULE ORAL at 06:05

## 2019-01-01 RX ADMIN — LEVETIRACETAM 500 MG: 500 TABLET, FILM COATED ORAL at 09:09

## 2019-01-01 RX ADMIN — LEVETIRACETAM 500 MG: 500 TABLET ORAL at 08:04

## 2019-01-01 RX ADMIN — CALCITRIOL 0.5 MCG: 1 SOLUTION ORAL at 08:04

## 2019-01-01 RX ADMIN — SODIUM POLYSTYRENE SULFONATE 30 G: 1 POWDER ORAL; RECTAL at 02:04

## 2019-01-01 RX ADMIN — CALCITRIOL 1 MCG: 1 SOLUTION ORAL at 08:05

## 2019-01-01 RX ADMIN — NICARDIPINE HYDROCHLORIDE 10 MG/HR: 0.2 INJECTION, SOLUTION INTRAVENOUS at 05:04

## 2019-01-01 RX ADMIN — DEXAMETHASONE 40 MG: 4 TABLET ORAL at 08:05

## 2019-01-01 RX ADMIN — CALCITRIOL 1 MCG: 1 SOLUTION ORAL at 11:05

## 2019-01-01 RX ADMIN — CLONIDINE HYDROCHLORIDE 0.3 MG: 0.2 TABLET ORAL at 09:05

## 2019-01-01 RX ADMIN — ALBUTEROL SULFATE 10 MG: 2.5 SOLUTION RESPIRATORY (INHALATION) at 07:04

## 2019-01-01 RX ADMIN — CARVEDILOL 25 MG: 25 TABLET, FILM COATED ORAL at 12:05

## 2019-01-01 RX ADMIN — NICARDIPINE HYDROCHLORIDE 5 MG/HR: 0.2 INJECTION, SOLUTION INTRAVENOUS at 12:04

## 2019-01-01 RX ADMIN — TACROLIMUS 5 MG: 5 CAPSULE ORAL at 08:05

## 2019-01-01 RX ADMIN — CLONIDINE HYDROCHLORIDE 0.1 MG: 0.1 TABLET ORAL at 02:03

## 2019-01-01 RX ADMIN — IRBESARTAN 300 MG: 150 TABLET ORAL at 10:05

## 2019-01-01 RX ADMIN — PANTOPRAZOLE SODIUM 40 MG: 40 TABLET, DELAYED RELEASE ORAL at 08:05

## 2019-01-01 RX ADMIN — INSULIN ASPART 2 UNITS: 100 INJECTION, SOLUTION INTRAVENOUS; SUBCUTANEOUS at 12:04

## 2019-01-01 RX ADMIN — FAMOTIDINE 20 MG: 20 TABLET, FILM COATED ORAL at 09:05

## 2019-01-01 RX ADMIN — IRBESARTAN 300 MG: 150 TABLET ORAL at 09:05

## 2019-01-01 RX ADMIN — LEVETIRACETAM 500 MG: 500 TABLET ORAL at 07:05

## 2019-01-01 RX ADMIN — CALCIUM CARBONATE 2000 MG: 1250 SUSPENSION ORAL at 09:04

## 2019-01-01 RX ADMIN — NICARDIPINE HYDROCHLORIDE 12.5 MG/HR: 0.2 INJECTION, SOLUTION INTRAVENOUS at 09:04

## 2019-01-01 RX ADMIN — SODIUM CHLORIDE 500 ML: 0.9 INJECTION, SOLUTION INTRAVENOUS at 09:08

## 2019-01-01 RX ADMIN — HYDRALAZINE HYDROCHLORIDE 100 MG: 50 TABLET ORAL at 04:05

## 2019-01-01 RX ADMIN — VERAPAMIL HYDROCHLORIDE 240 MG: 120 TABLET, FILM COATED, EXTENDED RELEASE ORAL at 10:05

## 2019-01-01 RX ADMIN — POLYETHYLENE GLYCOL 3350 17 G: 17 POWDER, FOR SOLUTION ORAL at 08:05

## 2019-01-01 RX ADMIN — TACROLIMUS 6 MG: 1 CAPSULE ORAL at 06:03

## 2019-01-01 RX ADMIN — CLONIDINE HYDROCHLORIDE 0.3 MG: 0.2 TABLET ORAL at 09:04

## 2019-01-01 RX ADMIN — FENTANYL CITRATE 50 MCG: 50 INJECTION, SOLUTION INTRAMUSCULAR; INTRAVENOUS at 06:04

## 2019-01-01 RX ADMIN — Medication 6 MG: at 10:04

## 2019-01-01 RX ADMIN — ISOSORBIDE MONONITRATE 90 MG: 30 TABLET, EXTENDED RELEASE ORAL at 09:05

## 2019-01-01 RX ADMIN — LIDOCAINE HYDROCHLORIDE 50 MG: 20 INJECTION, SOLUTION INTRAVENOUS at 07:04

## 2019-01-01 RX ADMIN — SPIRONOLACTONE 25 MG: 25 TABLET, FILM COATED ORAL at 08:05

## 2019-01-01 RX ADMIN — TACROLIMUS 3 MG: 1 CAPSULE ORAL at 05:05

## 2019-01-01 RX ADMIN — VERAPAMIL HYDROCHLORIDE 240 MG: 120 TABLET, FILM COATED, EXTENDED RELEASE ORAL at 12:05

## 2019-01-01 RX ADMIN — CARVEDILOL 25 MG: 12.5 TABLET, FILM COATED ORAL at 11:03

## 2019-01-01 RX ADMIN — FAMOTIDINE 40 MG: 20 TABLET ORAL at 12:03

## 2019-01-01 RX ADMIN — NICARDIPINE HYDROCHLORIDE 10 MG/HR: 0.2 INJECTION, SOLUTION INTRAVENOUS at 08:04

## 2019-01-01 RX ADMIN — TACROLIMUS 6 MG: 1 CAPSULE ORAL at 05:03

## 2019-01-01 RX ADMIN — SUCCINYLCHOLINE CHLORIDE 100 MG: 20 INJECTION, SOLUTION INTRAMUSCULAR; INTRAVENOUS at 08:03

## 2019-01-01 RX ADMIN — VERAPAMIL HYDROCHLORIDE 240 MG: 240 TABLET, FILM COATED, EXTENDED RELEASE ORAL at 08:05

## 2019-01-01 RX ADMIN — POLYETHYLENE GLYCOL 3350 17 G: 17 POWDER, FOR SOLUTION ORAL at 08:04

## 2019-01-01 RX ADMIN — SODIUM CHLORIDE: 234 INJECTION INTRAMUSCULAR; INTRAVENOUS; SUBCUTANEOUS at 10:04

## 2019-01-01 RX ADMIN — PROPOFOL 30 MG: 10 INJECTION, EMULSION INTRAVENOUS at 07:04

## 2019-01-01 RX ADMIN — ISOSORBIDE MONONITRATE 90 MG: 30 TABLET, EXTENDED RELEASE ORAL at 08:05

## 2019-01-01 RX ADMIN — CALCIUM GLUCONATE 2 G: 98 INJECTION, SOLUTION INTRAVENOUS at 08:03

## 2019-01-01 RX ADMIN — LABETALOL HCL IV SOLN PREFILLED SYRINGE 20 MG/4ML (5 MG/ML) 10 MG: 20/4 SOLUTION PREFILLED SYRINGE at 06:05

## 2019-01-01 RX ADMIN — SEVELAMER CARBONATE 800 MG: 800 TABLET, FILM COATED ORAL at 08:03

## 2019-01-01 RX ADMIN — HYDRALAZINE HYDROCHLORIDE 100 MG: 50 TABLET ORAL at 06:09

## 2019-01-01 RX ADMIN — PHENYLEPHRINE HYDROCHLORIDE 200 MCG: 10 INJECTION INTRAVENOUS at 08:03

## 2019-01-01 RX ADMIN — Medication 6 MG: at 08:04

## 2019-01-01 RX ADMIN — DIPHENHYDRAMINE HYDROCHLORIDE 25 MG: 25 CAPSULE ORAL at 09:03

## 2019-01-01 RX ADMIN — ONDANSETRON 8 MG: 8 TABLET, ORALLY DISINTEGRATING ORAL at 04:03

## 2019-01-01 RX ADMIN — ESMOLOL HYDROCHLORIDE 20 MG: 10 INJECTION INTRAVENOUS at 06:04

## 2019-01-01 RX ADMIN — NICARDIPINE HYDROCHLORIDE 7.5 MG/HR: 0.2 INJECTION, SOLUTION INTRAVENOUS at 11:04

## 2019-01-01 RX ADMIN — HYDRALAZINE HYDROCHLORIDE 50 MG: 50 TABLET ORAL at 10:04

## 2019-01-01 RX ADMIN — LACOSAMIDE 50 MG: 50 TABLET, FILM COATED ORAL at 09:04

## 2019-01-01 RX ADMIN — SODIUM CHLORIDE 125 ML: 0.9 INJECTION, SOLUTION INTRAVENOUS at 07:03

## 2019-01-01 RX ADMIN — STANDARDIZED SENNA CONCENTRATE AND DOCUSATE SODIUM 2 TABLET: 8.6; 5 TABLET, FILM COATED ORAL at 08:04

## 2019-01-01 RX ADMIN — HYDRALAZINE HYDROCHLORIDE 50 MG: 25 TABLET, FILM COATED ORAL at 06:03

## 2019-01-01 RX ADMIN — HYDRALAZINE HYDROCHLORIDE 100 MG: 50 TABLET ORAL at 03:05

## 2019-01-01 RX ADMIN — LEVETIRACETAM 500 MG: 500 TABLET ORAL at 10:05

## 2019-01-01 RX ADMIN — LACOSAMIDE 50 MG: 50 TABLET, FILM COATED ORAL at 08:03

## 2019-01-01 RX ADMIN — EPOETIN ALFA-EPBX 3000 UNITS: 3000 INJECTION, SOLUTION INTRAVENOUS; SUBCUTANEOUS at 09:05

## 2019-01-01 RX ADMIN — HYDRALAZINE HYDROCHLORIDE 50 MG: 25 TABLET, FILM COATED ORAL at 09:03

## 2019-01-01 RX ADMIN — FAMOTIDINE 40 MG: 20 TABLET, FILM COATED ORAL at 08:03

## 2019-01-01 RX ADMIN — HYDRALAZINE HYDROCHLORIDE 100 MG: 50 TABLET ORAL at 05:04

## 2019-01-01 RX ADMIN — OXYCODONE HYDROCHLORIDE 5 MG: 5 TABLET ORAL at 08:04

## 2019-01-01 RX ADMIN — HYDRALAZINE HYDROCHLORIDE 10 MG: 20 INJECTION INTRAMUSCULAR; INTRAVENOUS at 09:04

## 2019-01-01 RX ADMIN — SODIUM CHLORIDE: 0.9 INJECTION, SOLUTION INTRAVENOUS at 07:03

## 2019-01-01 RX ADMIN — STANDARDIZED SENNA CONCENTRATE AND DOCUSATE SODIUM 1 TABLET: 8.6; 5 TABLET, FILM COATED ORAL at 09:03

## 2019-01-01 RX ADMIN — ISOSORBIDE MONONITRATE 30 MG: 30 TABLET, EXTENDED RELEASE ORAL at 12:05

## 2019-01-01 RX ADMIN — MAGNESIUM CITRATE 296 ML: 1.75 LIQUID ORAL at 10:04

## 2019-01-01 RX ADMIN — LEVETIRACETAM 500 MG: 500 TABLET ORAL at 09:03

## 2019-01-01 RX ADMIN — CLONIDINE HYDROCHLORIDE 0.3 MG: 0.2 TABLET ORAL at 04:05

## 2019-01-01 RX ADMIN — FAMOTIDINE 20 MG: 20 TABLET, FILM COATED ORAL at 08:04

## 2019-01-01 RX ADMIN — NICARDIPINE HYDROCHLORIDE 15 MG/HR: 0.2 INJECTION, SOLUTION INTRAVENOUS at 05:04

## 2019-01-01 RX ADMIN — TACROLIMUS 5 MG: 5 CAPSULE ORAL at 06:04

## 2019-01-01 RX ADMIN — IRBESARTAN 300 MG: 150 TABLET ORAL at 06:03

## 2019-01-01 RX ADMIN — HYDRALAZINE HYDROCHLORIDE 100 MG: 50 TABLET ORAL at 01:05

## 2019-01-01 RX ADMIN — LEVETIRACETAM 500 MG: 500 TABLET ORAL at 01:03

## 2019-01-01 RX ADMIN — HYDRALAZINE HYDROCHLORIDE 100 MG: 50 TABLET ORAL at 02:04

## 2019-01-01 RX ADMIN — Medication 2000 MG: at 09:03

## 2019-01-01 RX ADMIN — Medication 3 MG: at 08:04

## 2019-01-01 RX ADMIN — SODIUM CHLORIDE 500 ML: 0.9 INJECTION, SOLUTION INTRAVENOUS at 10:08

## 2019-01-01 RX ADMIN — CALCIUM 2000 MG: 500 TABLET ORAL at 03:03

## 2019-01-01 RX ADMIN — HYDRALAZINE HYDROCHLORIDE 100 MG: 25 TABLET, FILM COATED ORAL at 01:04

## 2019-01-01 RX ADMIN — HYDRALAZINE HYDROCHLORIDE 100 MG: 50 TABLET ORAL at 10:05

## 2019-01-01 RX ADMIN — HYDRALAZINE HYDROCHLORIDE 10 MG: 20 INJECTION INTRAMUSCULAR; INTRAVENOUS at 06:05

## 2019-01-01 RX ADMIN — ACETAMINOPHEN 650 MG: 325 TABLET ORAL at 10:08

## 2019-01-01 RX ADMIN — Medication 2 MG: at 05:04

## 2019-01-01 RX ADMIN — PROPOFOL 50 MG: 10 INJECTION, EMULSION INTRAVENOUS at 08:03

## 2019-01-01 RX ADMIN — CARVEDILOL 25 MG: 25 TABLET, FILM COATED ORAL at 09:04

## 2019-01-01 RX ADMIN — CHLORHEXIDINE GLUCONATE 0.12% ORAL RINSE 15 ML: 1.2 LIQUID ORAL at 10:04

## 2019-01-01 RX ADMIN — ONDANSETRON 4 MG: 2 INJECTION INTRAMUSCULAR; INTRAVENOUS at 09:03

## 2019-01-01 RX ADMIN — CALCIUM CARBONATE 2000 MG: 1250 SUSPENSION ORAL at 11:04

## 2019-01-01 RX ADMIN — DEXAMETHASONE SODIUM PHOSPHATE 4 MG: 4 INJECTION, SOLUTION INTRAMUSCULAR; INTRAVENOUS at 01:04

## 2019-01-01 RX ADMIN — ALUMINUM HYDROXIDE, MAGNESIUM HYDROXIDE, AND SIMETHICONE 50 ML: 200; 200; 20 SUSPENSION ORAL at 02:09

## 2019-01-01 RX ADMIN — EPHEDRINE SULFATE 10 MG: 50 INJECTION, SOLUTION INTRAMUSCULAR; INTRAVENOUS; SUBCUTANEOUS at 09:03

## 2019-01-01 RX ADMIN — CLONIDINE HYDROCHLORIDE 0.3 MG: 0.3 TABLET ORAL at 05:07

## 2019-01-01 RX ADMIN — ISOSORBIDE MONONITRATE 30 MG: 30 TABLET, EXTENDED RELEASE ORAL at 10:09

## 2019-01-01 RX ADMIN — CALCITRIOL 1 MCG: 0.25 CAPSULE ORAL at 08:03

## 2019-01-01 RX ADMIN — SODIUM CHLORIDE 350 ML: 0.9 INJECTION, SOLUTION INTRAVENOUS at 09:03

## 2019-01-01 RX ADMIN — CLONIDINE HYDROCHLORIDE 0.3 MG: 0.2 TABLET ORAL at 03:05

## 2019-01-01 RX ADMIN — CLONIDINE HYDROCHLORIDE 0.1 MG: 0.1 TABLET ORAL at 10:04

## 2019-01-01 RX ADMIN — LABETALOL HCL IV SOLN PREFILLED SYRINGE 20 MG/4ML (5 MG/ML) 10 MG: 20/4 SOLUTION PREFILLED SYRINGE at 04:04

## 2019-01-01 RX ADMIN — POLYETHYLENE GLYCOL 3350 17 G: 17 POWDER, FOR SOLUTION ORAL at 10:04

## 2019-01-01 RX ADMIN — DEXAMETHASONE SODIUM PHOSPHATE 12 MG: 4 INJECTION, SOLUTION INTRAMUSCULAR; INTRAVENOUS at 08:04

## 2019-01-01 RX ADMIN — INSULIN ASPART 4 UNITS: 100 INJECTION, SOLUTION INTRAVENOUS; SUBCUTANEOUS at 11:04

## 2019-01-01 RX ADMIN — Medication 2 MG: at 08:04

## 2019-01-01 RX ADMIN — CALCIUM 2000 MG: 500 TABLET ORAL at 10:04

## 2019-01-01 RX ADMIN — SODIUM CHLORIDE 300 ML: 0.9 INJECTION, SOLUTION INTRAVENOUS at 02:05

## 2019-01-01 RX ADMIN — CALCIUM GLUCONATE 1000 MG: 94 INJECTION, SOLUTION INTRAVENOUS at 07:04

## 2019-01-01 RX ADMIN — CALCIUM GLUCONATE: 98 INJECTION, SOLUTION INTRAVENOUS at 08:03

## 2019-01-01 RX ADMIN — SODIUM CHLORIDE 350 ML: 0.9 INJECTION, SOLUTION INTRAVENOUS at 03:05

## 2019-01-01 RX ADMIN — ROCURONIUM BROMIDE 6 MG: 10 INJECTION, SOLUTION INTRAVENOUS at 08:03

## 2019-01-01 RX ADMIN — CLONIDINE HYDROCHLORIDE 0.2 MG: 0.2 TABLET ORAL at 05:04

## 2019-01-01 RX ADMIN — DEXAMETHASONE SODIUM PHOSPHATE 4 MG: 4 INJECTION, SOLUTION INTRAMUSCULAR; INTRAVENOUS at 12:04

## 2019-01-01 RX ADMIN — IPRATROPIUM BROMIDE AND ALBUTEROL SULFATE 3 ML: .5; 3 SOLUTION RESPIRATORY (INHALATION) at 07:09

## 2019-01-01 RX ADMIN — ONDANSETRON 4 MG: 2 INJECTION INTRAMUSCULAR; INTRAVENOUS at 10:08

## 2019-01-01 RX ADMIN — VERAPAMIL HYDROCHLORIDE 240 MG: 120 TABLET, FILM COATED, EXTENDED RELEASE ORAL at 08:04

## 2019-01-01 RX ADMIN — CARVEDILOL 25 MG: 25 TABLET, FILM COATED ORAL at 10:05

## 2019-01-01 RX ADMIN — HYDRALAZINE HYDROCHLORIDE 50 MG: 25 TABLET, FILM COATED ORAL at 05:03

## 2019-01-01 RX ADMIN — Medication 10 MG: at 12:04

## 2019-01-01 RX ADMIN — LACOSAMIDE 50 MG: 50 TABLET, FILM COATED ORAL at 09:03

## 2019-01-01 RX ADMIN — LABETALOL HYDROCHLORIDE 300 MG: 200 TABLET, FILM COATED ORAL at 02:05

## 2019-01-01 RX ADMIN — SENNOSIDES AND DOCUSATE SODIUM 2 TABLET: 8.6; 5 TABLET ORAL at 10:05

## 2019-01-01 RX ADMIN — TACROLIMUS 1 MG: 1 CAPSULE ORAL at 11:05

## 2019-01-01 RX ADMIN — SENNOSIDES AND DOCUSATE SODIUM 2 TABLET: 8.6; 5 TABLET ORAL at 09:04

## 2019-01-01 RX ADMIN — ROCURONIUM BROMIDE 20 MG: 10 INJECTION, SOLUTION INTRAVENOUS at 07:04

## 2019-01-01 RX ADMIN — CEFTRIAXONE 2 G: 2 INJECTION, SOLUTION INTRAVENOUS at 10:04

## 2019-01-01 RX ADMIN — FENTANYL CITRATE 50 MCG: 50 INJECTION, SOLUTION INTRAMUSCULAR; INTRAVENOUS at 07:04

## 2019-01-01 RX ADMIN — SODIUM CHLORIDE: 0.9 INJECTION, SOLUTION INTRAVENOUS at 03:03

## 2019-01-01 RX ADMIN — POTASSIUM & SODIUM PHOSPHATES POWDER PACK 280-160-250 MG 1 PACKET: 280-160-250 PACK at 09:05

## 2019-01-01 RX ADMIN — ACETAMINOPHEN 1000 MG: 500 TABLET ORAL at 08:03

## 2019-01-01 RX ADMIN — LABETALOL HCL IV SOLN PREFILLED SYRINGE 20 MG/4ML (5 MG/ML) 10 MG: 20/4 SOLUTION PREFILLED SYRINGE at 06:03

## 2019-01-01 RX ADMIN — EPOETIN ALFA-EPBX 3000 UNITS: 3000 INJECTION, SOLUTION INTRAVENOUS; SUBCUTANEOUS at 03:05

## 2019-01-01 RX ADMIN — HYDRALAZINE HYDROCHLORIDE 75 MG: 50 TABLET ORAL at 05:04

## 2019-01-01 RX ADMIN — OXYCODONE HYDROCHLORIDE 5 MG: 5 TABLET ORAL at 12:03

## 2019-01-01 RX ADMIN — TACROLIMUS 5 MG: 5 CAPSULE ORAL at 09:05

## 2019-01-01 RX ADMIN — PANTOPRAZOLE SODIUM 40 MG: 40 TABLET, DELAYED RELEASE ORAL at 09:05

## 2019-01-01 RX ADMIN — SODIUM CHLORIDE: 234 INJECTION INTRAMUSCULAR; INTRAVENOUS; SUBCUTANEOUS at 09:04

## 2019-01-01 RX ADMIN — HYDRALAZINE HYDROCHLORIDE 100 MG: 25 TABLET, FILM COATED ORAL at 06:05

## 2019-01-01 RX ADMIN — INSULIN ASPART 1 UNITS: 100 INJECTION, SOLUTION INTRAVENOUS; SUBCUTANEOUS at 09:05

## 2019-01-01 RX ADMIN — CALCIUM GLUCONATE 1000 MG: 94 INJECTION, SOLUTION INTRAVENOUS at 01:03

## 2019-01-01 RX ADMIN — Medication 10 MG: at 08:05

## 2019-01-01 RX ADMIN — CALCIUM CARBONATE (ANTACID) CHEW TAB 500 MG 500 MG: 500 CHEW TAB at 02:05

## 2019-01-01 RX ADMIN — CALCIUM GLUCONATE 1000 MG: 98 INJECTION, SOLUTION INTRAVENOUS at 01:04

## 2019-01-01 RX ADMIN — NICARDIPINE HYDROCHLORIDE 5 MG/HR: 0.2 INJECTION, SOLUTION INTRAVENOUS at 01:04

## 2019-01-01 RX ADMIN — FAMOTIDINE 20 MG: 20 TABLET, FILM COATED ORAL at 10:04

## 2019-01-01 RX ADMIN — LABETALOL HCL IV SOLN PREFILLED SYRINGE 20 MG/4ML (5 MG/ML) 10 MG: 20/4 SOLUTION PREFILLED SYRINGE at 12:04

## 2019-01-01 RX ADMIN — LABETALOL HCL IV SOLN PREFILLED SYRINGE 20 MG/4ML (5 MG/ML) 10 MG: 20/4 SOLUTION PREFILLED SYRINGE at 02:05

## 2019-01-01 RX ADMIN — SODIUM CHLORIDE TAB 1 GM 2 G: 1 TAB at 10:09

## 2019-01-01 RX ADMIN — SODIUM CHLORIDE: 0.9 INJECTION, SOLUTION INTRAVENOUS at 02:05

## 2019-01-01 RX ADMIN — TACROLIMUS 6 MG: 1 CAPSULE ORAL at 01:03

## 2019-01-01 RX ADMIN — EPOETIN ALFA-EPBX 3000 UNITS: 3000 INJECTION, SOLUTION INTRAVENOUS; SUBCUTANEOUS at 12:05

## 2019-01-01 RX ADMIN — CALCIUM 2000 MG: 500 TABLET ORAL at 05:03

## 2019-01-01 RX ADMIN — CALCIUM GLUCONATE 1000 MG: 94 INJECTION, SOLUTION INTRAVENOUS at 04:04

## 2019-01-01 RX ADMIN — CALCITRIOL 0.5 MCG: 1 SOLUTION ORAL at 09:04

## 2019-01-01 RX ADMIN — LIDOCAINE HYDROCHLORIDE 60 MG: 20 INJECTION, SOLUTION INTRAVENOUS at 08:03

## 2019-01-01 RX ADMIN — NICARDIPINE HYDROCHLORIDE 7.5 MG/HR: 0.2 INJECTION, SOLUTION INTRAVENOUS at 05:04

## 2019-01-01 RX ADMIN — HYDRALAZINE HYDROCHLORIDE 10 MG: 20 INJECTION INTRAMUSCULAR; INTRAVENOUS at 02:04

## 2019-01-01 RX ADMIN — CHLORHEXIDINE GLUCONATE 0.12% ORAL RINSE 15 ML: 1.2 LIQUID ORAL at 08:04

## 2019-01-01 RX ADMIN — MIDAZOLAM 2 MG: 5 INJECTION INTRAMUSCULAR; INTRAVENOUS at 05:04

## 2019-01-01 RX ADMIN — CLONIDINE HYDROCHLORIDE 0.3 MG: 0.2 TABLET ORAL at 01:05

## 2019-01-01 RX ADMIN — TACROLIMUS 5 MG: 5 CAPSULE ORAL at 12:05

## 2019-01-01 RX ADMIN — ACETAMINOPHEN 1000 MG: 500 TABLET ORAL at 09:05

## 2019-01-01 RX ADMIN — CALCIUM CARBONATE 2000 MG: 1250 SUSPENSION ORAL at 12:05

## 2019-01-01 RX ADMIN — Medication 10 MG: at 09:05

## 2019-01-01 RX ADMIN — MUPIROCIN 1 G: 20 OINTMENT TOPICAL at 10:04

## 2019-01-01 RX ADMIN — TACROLIMUS 4 MG: 1 CAPSULE ORAL at 06:05

## 2019-01-01 RX ADMIN — VERAPAMIL HYDROCHLORIDE 240 MG: 120 TABLET, FILM COATED, EXTENDED RELEASE ORAL at 09:05

## 2019-01-01 RX ADMIN — SODIUM CHLORIDE, SODIUM LACTATE, POTASSIUM CHLORIDE, AND CALCIUM CHLORIDE 1000 ML: .6; .31; .03; .02 INJECTION, SOLUTION INTRAVENOUS at 02:08

## 2019-01-01 RX ADMIN — CALCIUM 2000 MG: 500 TABLET ORAL at 08:03

## 2019-01-01 RX ADMIN — DEXAMETHASONE SODIUM PHOSPHATE 4 MG: 4 INJECTION, SOLUTION INTRAMUSCULAR; INTRAVENOUS at 10:04

## 2019-01-01 RX ADMIN — Medication 10 MG: at 09:04

## 2019-01-01 RX ADMIN — CEFAZOLIN 2 G: 1 INJECTION, POWDER, FOR SOLUTION INTRAMUSCULAR; INTRAVENOUS at 10:04

## 2019-01-01 RX ADMIN — LABETALOL HCL IV SOLN PREFILLED SYRINGE 20 MG/4ML (5 MG/ML) 10 MG: 20/4 SOLUTION PREFILLED SYRINGE at 05:04

## 2019-01-01 RX ADMIN — CARVEDILOL 25 MG: 25 TABLET, FILM COATED ORAL at 08:03

## 2019-01-01 RX ADMIN — CLONIDINE HYDROCHLORIDE 0.3 MG: 0.2 TABLET ORAL at 08:05

## 2019-01-01 RX ADMIN — LABETALOL HCL IV SOLN PREFILLED SYRINGE 20 MG/4ML (5 MG/ML) 20 MG: 20/4 SOLUTION PREFILLED SYRINGE at 11:05

## 2019-01-01 RX ADMIN — ISOSORBIDE MONONITRATE 90 MG: 30 TABLET, EXTENDED RELEASE ORAL at 12:05

## 2019-01-01 RX ADMIN — CARVEDILOL 25 MG: 25 TABLET, FILM COATED ORAL at 01:03

## 2019-01-01 RX ADMIN — CARVEDILOL 25 MG: 25 TABLET, FILM COATED ORAL at 12:03

## 2019-01-01 RX ADMIN — TACROLIMUS 4 MG: 1 CAPSULE ORAL at 07:05

## 2019-01-01 RX ADMIN — LABETALOL HCL IV SOLN PREFILLED SYRINGE 20 MG/4ML (5 MG/ML) 10 MG: 20/4 SOLUTION PREFILLED SYRINGE at 08:05

## 2019-01-01 RX ADMIN — METOPROLOL TARTRATE 3 MG: 5 INJECTION, SOLUTION INTRAVENOUS at 07:04

## 2019-01-01 RX ADMIN — CLONIDINE HYDROCHLORIDE 0.1 MG: 0.1 TABLET ORAL at 08:04

## 2019-01-01 RX ADMIN — MINOXIDIL 10 MG: 10 TABLET ORAL at 10:09

## 2019-01-01 RX ADMIN — LABETALOL HCL IV SOLN PREFILLED SYRINGE 20 MG/4ML (5 MG/ML) 10 MG: 20/4 SOLUTION PREFILLED SYRINGE at 11:03

## 2019-01-01 RX ADMIN — HYDRALAZINE HYDROCHLORIDE 10 MG: 20 INJECTION INTRAMUSCULAR; INTRAVENOUS at 09:05

## 2019-01-01 RX ADMIN — VERAPAMIL HYDROCHLORIDE 240 MG: 120 TABLET, FILM COATED, EXTENDED RELEASE ORAL at 08:03

## 2019-01-01 RX ADMIN — Medication 3 MG: at 05:05

## 2019-01-01 RX ADMIN — LACOSAMIDE 50 MG: 50 TABLET, FILM COATED ORAL at 12:05

## 2019-01-01 RX ADMIN — Medication 3 MG: at 06:04

## 2019-01-01 RX ADMIN — CEFAZOLIN 2 G: 330 INJECTION, POWDER, FOR SOLUTION INTRAMUSCULAR; INTRAVENOUS at 08:03

## 2019-01-01 RX ADMIN — CLONIDINE HYDROCHLORIDE 0.3 MG: 0.2 TABLET ORAL at 07:05

## 2019-01-01 RX ADMIN — HYDRALAZINE HYDROCHLORIDE 50 MG: 50 TABLET ORAL at 01:04

## 2019-01-01 RX ADMIN — LABETALOL HCL IV SOLN PREFILLED SYRINGE 20 MG/4ML (5 MG/ML) 10 MG: 20/4 SOLUTION PREFILLED SYRINGE at 05:05

## 2019-01-01 RX ADMIN — CALCITRIOL 0.25 MCG: 0.25 CAPSULE ORAL at 02:03

## 2019-01-01 RX ADMIN — CALCIUM GLUCONATE 1000 MG: 98 INJECTION, SOLUTION INTRAVENOUS at 11:04

## 2019-01-01 RX ADMIN — INSULIN ASPART 2 UNITS: 100 INJECTION, SOLUTION INTRAVENOUS; SUBCUTANEOUS at 10:04

## 2019-01-01 RX ADMIN — CALCIUM GLUCONATE 1000 MG: 94 INJECTION, SOLUTION INTRAVENOUS at 03:04

## 2019-01-01 RX ADMIN — CALCIUM GLUCONATE 3 G: 98 INJECTION, SOLUTION INTRAVENOUS at 11:05

## 2019-01-01 RX ADMIN — SENNOSIDES,DOCUSATE SODIUM 2 TABLET: 50; 8.6 TABLET, FILM COATED ORAL at 09:05

## 2019-01-01 RX ADMIN — SODIUM CHLORIDE 300 ML: 0.9 INJECTION, SOLUTION INTRAVENOUS at 08:05

## 2019-01-01 RX ADMIN — SODIUM CHLORIDE TAB 1 GM 2 G: 1 TAB at 10:04

## 2019-01-01 RX ADMIN — MIDAZOLAM 2 MG: 1 INJECTION INTRAMUSCULAR; INTRAVENOUS at 03:04

## 2019-01-01 RX ADMIN — TACROLIMUS 6 MG: 1 CAPSULE ORAL at 12:03

## 2019-01-01 RX ADMIN — CALCIUM GLUCONATE 2 G: 98 INJECTION, SOLUTION INTRAVENOUS at 11:03

## 2019-01-01 RX ADMIN — INSULIN ASPART 2 UNITS: 100 INJECTION, SOLUTION INTRAVENOUS; SUBCUTANEOUS at 04:05

## 2019-01-01 RX ADMIN — PANTOPRAZOLE SODIUM 40 MG: 40 TABLET, DELAYED RELEASE ORAL at 12:05

## 2019-01-01 RX ADMIN — STANDARDIZED SENNA CONCENTRATE AND DOCUSATE SODIUM 1 TABLET: 8.6; 5 TABLET, FILM COATED ORAL at 10:04

## 2019-01-01 RX ADMIN — DEXTROSE MONOHYDRATE 25 G: 25 INJECTION, SOLUTION INTRAVENOUS at 10:04

## 2019-01-01 RX ADMIN — HYDRALAZINE HYDROCHLORIDE 10 MG: 20 INJECTION INTRAMUSCULAR; INTRAVENOUS at 03:05

## 2019-01-01 RX ADMIN — NICARDIPINE HYDROCHLORIDE 40 MG: 0.2 INJECTION, SOLUTION INTRAVENOUS at 02:04

## 2019-01-01 RX ADMIN — LABETALOL HCL IV SOLN PREFILLED SYRINGE 20 MG/4ML (5 MG/ML) 10 MG: 20/4 SOLUTION PREFILLED SYRINGE at 03:04

## 2019-01-01 RX ADMIN — NICARDIPINE HYDROCHLORIDE 5 MG/HR: 0.2 INJECTION, SOLUTION INTRAVENOUS at 09:04

## 2019-01-01 RX ADMIN — DEXAMETHASONE SODIUM PHOSPHATE 4 MG: 4 INJECTION, SOLUTION INTRAMUSCULAR; INTRAVENOUS at 11:04

## 2019-01-01 RX ADMIN — VERAPAMIL HYDROCHLORIDE 240 MG: 240 TABLET, FILM COATED, EXTENDED RELEASE ORAL at 07:05

## 2019-01-01 RX ADMIN — SODIUM CHLORIDE: 234 INJECTION INTRAMUSCULAR; INTRAVENOUS; SUBCUTANEOUS at 02:04

## 2019-01-01 RX ADMIN — CLONIDINE HYDROCHLORIDE 0.3 MG: 0.2 TABLET ORAL at 06:05

## 2019-01-01 RX ADMIN — LABETALOL HCL IV SOLN PREFILLED SYRINGE 20 MG/4ML (5 MG/ML) 10 MG: 20/4 SOLUTION PREFILLED SYRINGE at 12:05

## 2019-01-01 RX ADMIN — LACOSAMIDE 50 MG: 50 TABLET, FILM COATED ORAL at 09:09

## 2019-01-01 RX ADMIN — ONDANSETRON 4 MG: 2 INJECTION INTRAMUSCULAR; INTRAVENOUS at 08:04

## 2019-01-01 RX ADMIN — NICARDIPINE HYDROCHLORIDE 7.5 MG/HR: 0.2 INJECTION, SOLUTION INTRAVENOUS at 01:04

## 2019-01-01 RX ADMIN — IRBESARTAN 300 MG: 300 TABLET ORAL at 06:09

## 2019-01-01 RX ADMIN — METOCLOPRAMIDE 10 MG: 5 INJECTION, SOLUTION INTRAMUSCULAR; INTRAVENOUS at 12:05

## 2019-01-01 RX ADMIN — ONDANSETRON 4 MG: 2 INJECTION INTRAMUSCULAR; INTRAVENOUS at 03:09

## 2019-01-01 RX ADMIN — LACOSAMIDE 50 MG: 50 TABLET, FILM COATED ORAL at 12:04

## 2019-01-01 RX ADMIN — CALCITRIOL 1 MCG: 0.5 CAPSULE, LIQUID FILLED ORAL at 10:09

## 2019-01-01 RX ADMIN — SENNOSIDES,DOCUSATE SODIUM 2 TABLET: 50; 8.6 TABLET, FILM COATED ORAL at 08:05

## 2019-01-01 RX ADMIN — SODIUM CHLORIDE 500 ML: 0.9 INJECTION, SOLUTION INTRAVENOUS at 10:04

## 2019-01-01 RX ADMIN — CLONIDINE HYDROCHLORIDE 0.1 MG: 0.1 TABLET ORAL at 04:04

## 2019-01-01 RX ADMIN — CALCITRIOL 1 MCG: 0.25 CAPSULE ORAL at 09:03

## 2019-01-01 RX ADMIN — CLONIDINE HYDROCHLORIDE 0.1 MG: 0.1 TABLET ORAL at 08:03

## 2019-01-01 RX ADMIN — NICARDIPINE HYDROCHLORIDE 12.5 MG/HR: 0.2 INJECTION, SOLUTION INTRAVENOUS at 11:04

## 2019-01-01 RX ADMIN — LABETALOL HCL IV SOLN PREFILLED SYRINGE 20 MG/4ML (5 MG/ML) 40 MG: 20/4 SOLUTION PREFILLED SYRINGE at 12:05

## 2019-01-01 RX ADMIN — MUPIROCIN: 20 OINTMENT TOPICAL at 05:04

## 2019-01-01 RX ADMIN — VERAPAMIL HYDROCHLORIDE 240 MG: 120 TABLET, FILM COATED, EXTENDED RELEASE ORAL at 09:03

## 2019-01-01 RX ADMIN — NEOSTIGMINE METHYLSULFATE 4 MG: 1 INJECTION INTRAVENOUS at 08:04

## 2019-01-01 RX ADMIN — INSULIN ASPART 2 UNITS: 100 INJECTION, SOLUTION INTRAVENOUS; SUBCUTANEOUS at 05:05

## 2019-01-01 RX ADMIN — MIDAZOLAM HYDROCHLORIDE 2 MG: 1 INJECTION, SOLUTION INTRAMUSCULAR; INTRAVENOUS at 07:04

## 2019-01-01 RX ADMIN — SENNOSIDES AND DOCUSATE SODIUM 2 TABLET: 8.6; 5 TABLET ORAL at 09:05

## 2019-01-01 RX ADMIN — POTASSIUM & SODIUM PHOSPHATES POWDER PACK 280-160-250 MG 1 PACKET: 280-160-250 PACK at 07:05

## 2019-01-01 RX ADMIN — IRBESARTAN 300 MG: 150 TABLET ORAL at 05:04

## 2019-01-01 RX ADMIN — LABETALOL HCL IV SOLN PREFILLED SYRINGE 20 MG/4ML (5 MG/ML) 10 MG: 20/4 SOLUTION PREFILLED SYRINGE at 11:05

## 2019-01-01 RX ADMIN — SODIUM CHLORIDE 500 ML: 0.9 INJECTION, SOLUTION INTRAVENOUS at 12:09

## 2019-01-01 RX ADMIN — LEVETIRACETAM 500 MG: 500 TABLET ORAL at 06:05

## 2019-01-01 RX ADMIN — VERAPAMIL HYDROCHLORIDE 240 MG: 240 TABLET, FILM COATED, EXTENDED RELEASE ORAL at 09:05

## 2019-01-01 RX ADMIN — OXYCODONE HYDROCHLORIDE 10 MG: 10 TABLET ORAL at 03:03

## 2019-01-01 RX ADMIN — HYDRALAZINE HYDROCHLORIDE 50 MG: 50 TABLET ORAL at 09:04

## 2019-01-01 RX ADMIN — CALCIUM CARBONATE 2000 MG: 1250 SUSPENSION ORAL at 04:05

## 2019-01-01 RX ADMIN — HYDRALAZINE HYDROCHLORIDE 10 MG: 20 INJECTION INTRAMUSCULAR; INTRAVENOUS at 08:04

## 2019-01-01 RX ADMIN — ISOSORBIDE MONONITRATE 30 MG: 30 TABLET, EXTENDED RELEASE ORAL at 09:09

## 2019-01-01 RX ADMIN — IRBESARTAN 300 MG: 150 TABLET ORAL at 08:03

## 2019-01-01 RX ADMIN — SODIUM CHLORIDE: 0.9 INJECTION, SOLUTION INTRAVENOUS at 02:09

## 2019-01-01 RX ADMIN — CALCIUM GLUCONATE: 98 INJECTION, SOLUTION INTRAVENOUS at 12:03

## 2019-01-01 RX ADMIN — SODIUM CHLORIDE 1000 ML: 0.9 INJECTION, SOLUTION INTRAVENOUS at 12:05

## 2019-01-01 RX ADMIN — HYDRALAZINE HYDROCHLORIDE 100 MG: 25 TABLET, FILM COATED ORAL at 05:05

## 2019-01-01 RX ADMIN — CALCIUM 2000 MG: 500 TABLET ORAL at 12:03

## 2019-01-01 RX ADMIN — CALCIUM 2000 MG: 500 TABLET ORAL at 01:04

## 2019-01-01 RX ADMIN — CALCIUM GLUCONATE: 98 INJECTION, SOLUTION INTRAVENOUS at 07:03

## 2019-01-01 RX ADMIN — CALCIUM 1500 MG: 500 TABLET ORAL at 03:03

## 2019-01-01 RX ADMIN — HYDRALAZINE HYDROCHLORIDE 10 MG: 20 INJECTION INTRAMUSCULAR; INTRAVENOUS at 11:04

## 2019-01-01 RX ADMIN — HYDRALAZINE HYDROCHLORIDE 10 MG: 20 INJECTION INTRAMUSCULAR; INTRAVENOUS at 12:04

## 2019-01-01 RX ADMIN — HYDRALAZINE HYDROCHLORIDE 10 MG: 20 INJECTION INTRAMUSCULAR; INTRAVENOUS at 03:04

## 2019-01-01 RX ADMIN — ESMOLOL HYDROCHLORIDE 10 MG: 10 INJECTION INTRAVENOUS at 07:04

## 2019-01-01 RX ADMIN — CALCITRIOL 2 MCG: 0.5 CAPSULE, LIQUID FILLED ORAL at 10:09

## 2019-01-01 RX ADMIN — HYDRALAZINE HYDROCHLORIDE 10 MG: 20 INJECTION INTRAMUSCULAR; INTRAVENOUS at 04:04

## 2019-01-01 RX ADMIN — CALCIUM GLUCONATE 1000 MG: 98 INJECTION, SOLUTION INTRAVENOUS at 03:04

## 2019-01-01 RX ADMIN — STANDARDIZED SENNA CONCENTRATE AND DOCUSATE SODIUM 1 TABLET: 8.6; 5 TABLET, FILM COATED ORAL at 12:03

## 2019-01-01 RX ADMIN — DEXAMETHASONE 40 MG: 4 TABLET ORAL at 11:05

## 2019-01-01 RX ADMIN — CALCITRIOL 1 MCG: 1 SOLUTION ORAL at 10:05

## 2019-01-01 RX ADMIN — FAMOTIDINE 20 MG: 20 TABLET, FILM COATED ORAL at 09:04

## 2019-01-01 RX ADMIN — SODIUM CHLORIDE 500 ML: 0.9 INJECTION, SOLUTION INTRAVENOUS at 01:09

## 2019-01-01 RX ADMIN — TACROLIMUS 5 MG: 5 CAPSULE ORAL at 10:09

## 2019-01-01 RX ADMIN — LABETALOL HCL IV SOLN PREFILLED SYRINGE 20 MG/4ML (5 MG/ML) 10 MG: 20/4 SOLUTION PREFILLED SYRINGE at 09:04

## 2019-01-01 RX ADMIN — IRON SUCROSE 100 MG: 20 INJECTION, SOLUTION INTRAVENOUS at 02:05

## 2019-01-01 RX ADMIN — STANDARDIZED SENNA CONCENTRATE AND DOCUSATE SODIUM 1 TABLET: 8.6; 5 TABLET, FILM COATED ORAL at 08:04

## 2019-01-01 RX ADMIN — TACROLIMUS 6 MG: 1 CAPSULE ORAL at 11:04

## 2019-01-01 RX ADMIN — SEVELAMER CARBONATE 800 MG: 800 TABLET, FILM COATED ORAL at 01:03

## 2019-01-01 RX ADMIN — HYDRALAZINE HYDROCHLORIDE 5 MG: 20 INJECTION INTRAMUSCULAR; INTRAVENOUS at 07:07

## 2019-01-01 RX ADMIN — CALCIUM 2000 MG: 500 TABLET ORAL at 06:03

## 2019-01-01 RX ADMIN — PROPOFOL 100 MG: 10 INJECTION, EMULSION INTRAVENOUS at 07:04

## 2019-01-01 RX ADMIN — FAMOTIDINE 20 MG: 20 TABLET, FILM COATED ORAL at 10:05

## 2019-01-01 RX ADMIN — CALCIUM CARBONATE (ANTACID) CHEW TAB 500 MG 1000 MG: 500 CHEW TAB at 08:03

## 2019-01-01 RX ADMIN — FENTANYL CITRATE 50 MCG: 50 INJECTION, SOLUTION INTRAMUSCULAR; INTRAVENOUS at 08:04

## 2019-01-01 RX ADMIN — POLYETHYLENE GLYCOL 3350 17 G: 17 POWDER, FOR SOLUTION ORAL at 09:04

## 2019-01-01 RX ADMIN — CLONIDINE HYDROCHLORIDE 0.2 MG: 0.2 TABLET ORAL at 01:04

## 2019-01-01 RX ADMIN — SODIUM CHLORIDE 100 ML/HR: 0.9 INJECTION, SOLUTION INTRAVENOUS at 09:05

## 2019-01-01 RX ADMIN — ACETAMINOPHEN 650 MG: 325 TABLET ORAL at 06:05

## 2019-01-01 RX ADMIN — SODIUM CHLORIDE: 234 INJECTION INTRAMUSCULAR; INTRAVENOUS; SUBCUTANEOUS at 05:04

## 2019-01-01 RX ADMIN — CALCITRIOL 1 MCG: 0.25 CAPSULE ORAL at 10:04

## 2019-01-01 RX ADMIN — CALCITRIOL 0.5 MCG: 1 SOLUTION ORAL at 10:04

## 2019-01-01 RX ADMIN — SEVELAMER CARBONATE 800 MG: 800 TABLET, FILM COATED ORAL at 05:03

## 2019-01-01 RX ADMIN — SODIUM CHLORIDE: 0.9 INJECTION, SOLUTION INTRAVENOUS at 10:05

## 2019-01-01 RX ADMIN — HYDRALAZINE HYDROCHLORIDE 10 MG: 20 INJECTION INTRAMUSCULAR; INTRAVENOUS at 09:03

## 2019-01-01 RX ADMIN — LIDOCAINE HYDROCHLORIDE 1 MG: 10 INJECTION, SOLUTION EPIDURAL; INFILTRATION; INTRACAUDAL; PERINEURAL at 07:03

## 2019-01-01 RX ADMIN — CLONIDINE HYDROCHLORIDE 0.3 MG: 0.2 TABLET ORAL at 09:09

## 2019-01-01 RX ADMIN — HYDRALAZINE HYDROCHLORIDE 100 MG: 50 TABLET ORAL at 08:05

## 2019-01-01 RX ADMIN — TACROLIMUS 3 MG: 1 CAPSULE ORAL at 06:05

## 2019-01-01 RX ADMIN — HYDRALAZINE HYDROCHLORIDE 100 MG: 25 TABLET, FILM COATED ORAL at 05:03

## 2019-01-01 RX ADMIN — HYDRALAZINE HYDROCHLORIDE 50 MG: 25 TABLET, FILM COATED ORAL at 10:03

## 2019-01-01 RX ADMIN — PROPOFOL 50 MG: 10 INJECTION, EMULSION INTRAVENOUS at 06:04

## 2019-01-01 RX ADMIN — POTASSIUM & SODIUM PHOSPHATES POWDER PACK 280-160-250 MG 1 PACKET: 280-160-250 PACK at 06:05

## 2019-01-01 RX ADMIN — LABETALOL HCL IV SOLN PREFILLED SYRINGE 20 MG/4ML (5 MG/ML) 10 MG: 20/4 SOLUTION PREFILLED SYRINGE at 10:05

## 2019-01-01 RX ADMIN — HYDRALAZINE HYDROCHLORIDE 100 MG: 25 TABLET, FILM COATED ORAL at 06:04

## 2019-01-01 RX ADMIN — CALCIUM 2000 MG: 500 TABLET ORAL at 02:03

## 2019-01-01 RX ADMIN — CARVEDILOL 25 MG: 25 TABLET, FILM COATED ORAL at 06:04

## 2019-01-01 RX ADMIN — HYDRALAZINE HYDROCHLORIDE 100 MG: 50 TABLET ORAL at 05:09

## 2019-01-01 RX ADMIN — TACROLIMUS 6 MG: 1 CAPSULE ORAL at 08:03

## 2019-01-01 RX ADMIN — HYDRALAZINE HYDROCHLORIDE 10 MG: 20 INJECTION INTRAMUSCULAR; INTRAVENOUS at 05:04

## 2019-01-01 RX ADMIN — PROPOFOL 30 MCG/KG/MIN: 10 INJECTION, EMULSION INTRAVENOUS at 05:04

## 2019-01-01 RX ADMIN — STANDARDIZED SENNA CONCENTRATE AND DOCUSATE SODIUM 1 TABLET: 8.6; 5 TABLET, FILM COATED ORAL at 01:03

## 2019-01-01 RX ADMIN — CALCITRIOL 0.75 MCG: 0.25 CAPSULE ORAL at 09:03

## 2019-01-01 RX ADMIN — EPOETIN ALFA-EPBX 3000 UNITS: 3000 INJECTION, SOLUTION INTRAVENOUS; SUBCUTANEOUS at 01:05

## 2019-01-01 RX ADMIN — ISOSORBIDE MONONITRATE 30 MG: 30 TABLET, EXTENDED RELEASE ORAL at 05:05

## 2019-01-01 RX ADMIN — SODIUM CHLORIDE, SODIUM LACTATE, POTASSIUM CHLORIDE, AND CALCIUM CHLORIDE 500 ML: .6; .31; .03; .02 INJECTION, SOLUTION INTRAVENOUS at 11:08

## 2019-01-01 RX ADMIN — OXYCODONE HYDROCHLORIDE 5 MG: 5 TABLET ORAL at 11:04

## 2019-01-01 RX ADMIN — HYDRALAZINE HYDROCHLORIDE 10 MG: 20 INJECTION INTRAMUSCULAR; INTRAVENOUS at 02:05

## 2019-01-01 RX ADMIN — DICYCLOMINE HYDROCHLORIDE 20 MG: 20 INJECTION, SOLUTION INTRAMUSCULAR at 10:04

## 2019-01-01 RX ADMIN — HYDRALAZINE HYDROCHLORIDE 5 MG: 20 INJECTION INTRAMUSCULAR; INTRAVENOUS at 05:04

## 2019-01-01 RX ADMIN — HYDRALAZINE HYDROCHLORIDE 100 MG: 25 TABLET, FILM COATED ORAL at 10:04

## 2019-01-01 RX ADMIN — CLONIDINE HYDROCHLORIDE 0.3 MG: 0.2 TABLET ORAL at 11:05

## 2019-01-01 RX ADMIN — CALCITRIOL 2 MCG: 0.5 CAPSULE, LIQUID FILLED ORAL at 09:09

## 2019-01-01 RX ADMIN — SODIUM POLYSTYRENE SULFONATE 30 G: 1 POWDER ORAL; RECTAL at 10:04

## 2019-01-01 RX ADMIN — IRBESARTAN 300 MG: 150 TABLET ORAL at 12:03

## 2019-01-01 RX ADMIN — CALCIUM GLUCONATE: 98 INJECTION, SOLUTION INTRAVENOUS at 09:03

## 2019-01-01 RX ADMIN — HYDRALAZINE HYDROCHLORIDE 75 MG: 50 TABLET ORAL at 09:04

## 2019-01-01 RX ADMIN — CLONIDINE HYDROCHLORIDE 0.3 MG: 0.2 TABLET ORAL at 10:05

## 2019-01-01 RX ADMIN — CALCIUM CHLORIDE 1 G: 100 INJECTION, SOLUTION INTRAVENOUS at 05:04

## 2019-01-01 RX ADMIN — PROPOFOL 10 MCG/KG/MIN: 10 INJECTION, EMULSION INTRAVENOUS at 11:04

## 2019-01-01 RX ADMIN — CALCIUM GLUCONATE 3000 MG: 98 INJECTION, SOLUTION INTRAVENOUS at 11:04

## 2019-01-01 RX ADMIN — METOCLOPRAMIDE 10 MG: 5 INJECTION, SOLUTION INTRAMUSCULAR; INTRAVENOUS at 06:07

## 2019-01-01 RX ADMIN — LACOSAMIDE 50 MG: 50 TABLET, FILM COATED ORAL at 01:03

## 2019-01-01 RX ADMIN — LABETALOL HYDROCHLORIDE 200 MG: 200 TABLET, FILM COATED ORAL at 09:05

## 2019-01-01 RX ADMIN — CALCITRIOL 0.5 MCG: 0.25 CAPSULE ORAL at 08:03

## 2019-01-01 RX ADMIN — OXYCODONE HYDROCHLORIDE 5 MG: 5 TABLET ORAL at 02:04

## 2019-01-01 RX ADMIN — LABETALOL HCL IV SOLN PREFILLED SYRINGE 20 MG/4ML (5 MG/ML) 10 MG: 20/4 SOLUTION PREFILLED SYRINGE at 01:04

## 2019-01-01 RX ADMIN — TACROLIMUS 5 MG: 5 CAPSULE ORAL at 09:09

## 2019-01-01 RX ADMIN — CALCIUM CARBONATE 2000 MG: 1250 SUSPENSION ORAL at 03:05

## 2019-01-01 RX ADMIN — CALCITRIOL 1 MCG: 1 SOLUTION ORAL at 09:04

## 2019-01-01 RX ADMIN — CALCITRIOL 0.5 MCG: 0.5 CAPSULE ORAL at 10:04

## 2019-01-01 RX ADMIN — MANNITOL 50 G: 20 INJECTION, SOLUTION INTRAVENOUS at 04:04

## 2019-01-01 RX ADMIN — ACETAMINOPHEN 1000 MG: 500 TABLET ORAL at 12:05

## 2019-01-01 RX ADMIN — CLONIDINE HYDROCHLORIDE 0.3 MG: 0.3 TABLET ORAL at 05:05

## 2019-01-01 RX ADMIN — HYDRALAZINE HYDROCHLORIDE 10 MG: 20 INJECTION INTRAMUSCULAR; INTRAVENOUS at 01:05

## 2019-01-01 RX ADMIN — DEXAMETHASONE SODIUM PHOSPHATE 4 MG: 4 INJECTION, SOLUTION INTRAMUSCULAR; INTRAVENOUS at 02:04

## 2019-01-01 RX ADMIN — CLONIDINE HYDROCHLORIDE 0.1 MG: 0.1 TABLET ORAL at 09:04

## 2019-01-01 RX ADMIN — DEXAMETHASONE SODIUM PHOSPHATE 4 MG: 4 INJECTION, SOLUTION INTRAMUSCULAR; INTRAVENOUS at 06:04

## 2019-01-01 RX ADMIN — VERAPAMIL HYDROCHLORIDE 240 MG: 240 TABLET, FILM COATED, EXTENDED RELEASE ORAL at 01:03

## 2019-01-01 RX ADMIN — IRON SUCROSE 100 MG: 20 INJECTION, SOLUTION INTRAVENOUS at 10:05

## 2019-01-01 RX ADMIN — Medication 100 ML: at 10:04

## 2019-01-01 RX ADMIN — Medication 20 MG: at 06:04

## 2019-01-01 RX ADMIN — ERGOCALCIFEROL 50000 UNITS: 1.25 CAPSULE ORAL at 10:04

## 2019-01-01 RX ADMIN — CEFAZOLIN 2 G: 1 INJECTION, POWDER, FOR SOLUTION INTRAMUSCULAR; INTRAVENOUS at 08:04

## 2019-01-01 RX ADMIN — LABETALOL HYDROCHLORIDE 300 MG: 100 TABLET, FILM COATED ORAL at 05:05

## 2019-01-01 RX ADMIN — ACETAMINOPHEN 1000 MG: 500 TABLET ORAL at 01:05

## 2019-01-01 RX ADMIN — TACROLIMUS 5 MG: 5 CAPSULE ORAL at 05:05

## 2019-01-01 RX ADMIN — GLYCOPYRROLATE 0.4 MG: 0.2 INJECTION, SOLUTION INTRAMUSCULAR; INTRAVENOUS at 08:04

## 2019-01-01 RX ADMIN — CLONIDINE HYDROCHLORIDE 0.3 MG: 0.2 TABLET ORAL at 02:04

## 2019-01-01 RX ADMIN — SODIUM CHLORIDE TAB 1 GM 2 G: 1 TAB at 09:04

## 2019-01-01 RX ADMIN — INSULIN ASPART 4 UNITS: 100 INJECTION, SOLUTION INTRAVENOUS; SUBCUTANEOUS at 05:04

## 2019-01-01 RX ADMIN — ROCURONIUM BROMIDE 50 MG: 10 INJECTION, SOLUTION INTRAVENOUS at 06:04

## 2019-01-01 RX ADMIN — LABETALOL HYDROCHLORIDE 300 MG: 200 TABLET, FILM COATED ORAL at 10:05

## 2019-01-01 RX ADMIN — POTASSIUM & SODIUM PHOSPHATES POWDER PACK 280-160-250 MG 1 PACKET: 280-160-250 PACK at 10:05

## 2019-01-01 RX ADMIN — OXYCODONE HYDROCHLORIDE 5 MG: 5 TABLET ORAL at 02:05

## 2019-01-01 RX ADMIN — INSULIN ASPART 2 UNITS: 100 INJECTION, SOLUTION INTRAVENOUS; SUBCUTANEOUS at 05:04

## 2019-01-01 RX ADMIN — SODIUM CHLORIDE: 0.9 INJECTION, SOLUTION INTRAVENOUS at 07:04

## 2019-01-01 RX ADMIN — CALCIUM CARBONATE 2000 MG: 1250 SUSPENSION ORAL at 05:05

## 2019-01-01 RX ADMIN — LABETALOL HCL IV SOLN PREFILLED SYRINGE 20 MG/4ML (5 MG/ML) 10 MG: 20/4 SOLUTION PREFILLED SYRINGE at 09:05

## 2019-01-01 RX ADMIN — ACETAMINOPHEN 1000 MG: 10 INJECTION, SOLUTION INTRAVENOUS at 12:03

## 2019-01-01 RX ADMIN — TACROLIMUS 6 MG: 1 CAPSULE ORAL at 09:03

## 2019-01-01 RX ADMIN — NICARDIPINE HYDROCHLORIDE 7.5 MG/HR: 0.2 INJECTION, SOLUTION INTRAVENOUS at 05:05

## 2019-01-01 RX ADMIN — ISOSORBIDE MONONITRATE 90 MG: 30 TABLET, EXTENDED RELEASE ORAL at 11:05

## 2019-01-01 RX ADMIN — ACETAMINOPHEN 650 MG: 325 TABLET ORAL at 12:03

## 2019-01-01 RX ADMIN — ESMOLOL HYDROCHLORIDE 20 MG: 10 INJECTION INTRAVENOUS at 08:04

## 2019-01-01 RX ADMIN — HYDRALAZINE HYDROCHLORIDE 10 MG: 20 INJECTION INTRAMUSCULAR; INTRAVENOUS at 05:05

## 2019-01-01 RX ADMIN — IOHEXOL 75 ML: 350 INJECTION, SOLUTION INTRAVENOUS at 12:08

## 2019-01-01 RX ADMIN — NICARDIPINE HYDROCHLORIDE 10 MG/HR: 0.2 INJECTION, SOLUTION INTRAVENOUS at 01:04

## 2019-01-01 RX ADMIN — METOCLOPRAMIDE 10 MG: 5 INJECTION, SOLUTION INTRAMUSCULAR; INTRAVENOUS at 08:03

## 2019-01-01 RX ADMIN — INSULIN ASPART 6 UNITS: 100 INJECTION, SOLUTION INTRAVENOUS; SUBCUTANEOUS at 05:04

## 2019-01-01 RX ADMIN — NICARDIPINE HYDROCHLORIDE 1 MG/HR: 0.2 INJECTION, SOLUTION INTRAVENOUS at 02:04

## 2019-01-01 RX ADMIN — HYDRALAZINE HYDROCHLORIDE 10 MG: 20 INJECTION INTRAMUSCULAR; INTRAVENOUS at 01:04

## 2019-01-01 RX ADMIN — HYDRALAZINE HYDROCHLORIDE 100 MG: 25 TABLET, FILM COATED ORAL at 01:03

## 2019-01-01 RX ADMIN — ETOMIDATE 14 MG: 2 INJECTION INTRAVENOUS at 05:04

## 2019-01-01 RX ADMIN — HYDRALAZINE HYDROCHLORIDE 100 MG: 25 TABLET, FILM COATED ORAL at 05:07

## 2019-01-01 RX ADMIN — LABETALOL HCL IV SOLN PREFILLED SYRINGE 20 MG/4ML (5 MG/ML) 10 MG: 20/4 SOLUTION PREFILLED SYRINGE at 09:03

## 2019-01-01 RX ADMIN — STANDARDIZED SENNA CONCENTRATE AND DOCUSATE SODIUM 1 TABLET: 8.6; 5 TABLET, FILM COATED ORAL at 08:03

## 2019-01-01 RX ADMIN — STANDARDIZED SENNA CONCENTRATE AND DOCUSATE SODIUM 2 TABLET: 8.6; 5 TABLET, FILM COATED ORAL at 10:04

## 2019-01-01 RX ADMIN — NICARDIPINE HYDROCHLORIDE 12.5 MG/HR: 0.2 INJECTION, SOLUTION INTRAVENOUS at 12:04

## 2019-01-01 RX ADMIN — LEVETIRACETAM 500 MG: 500 TABLET, FILM COATED ORAL at 10:09

## 2019-01-01 RX ADMIN — LABETALOL HYDROCHLORIDE 200 MG: 200 TABLET, FILM COATED ORAL at 08:05

## 2019-01-01 RX ADMIN — VERAPAMIL HYDROCHLORIDE 240 MG: 240 TABLET, FILM COATED, EXTENDED RELEASE ORAL at 09:09

## 2019-01-01 RX ADMIN — SODIUM CHLORIDE: 0.9 INJECTION, SOLUTION INTRAVENOUS at 08:05

## 2019-01-01 RX ADMIN — NICARDIPINE HYDROCHLORIDE 15 MG/HR: 0.2 INJECTION, SOLUTION INTRAVENOUS at 07:04

## 2019-01-01 RX ADMIN — LABETALOL HYDROCHLORIDE 300 MG: 200 TABLET, FILM COATED ORAL at 06:05

## 2019-01-01 RX ADMIN — NICARDIPINE HYDROCHLORIDE 15 MG/HR: 0.2 INJECTION, SOLUTION INTRAVENOUS at 09:04

## 2019-01-01 RX ADMIN — ERGOCALCIFEROL 50000 UNITS: 1.25 CAPSULE ORAL at 12:03

## 2019-01-01 RX ADMIN — HYDRALAZINE HYDROCHLORIDE 100 MG: 50 TABLET ORAL at 09:09

## 2019-01-01 RX ADMIN — OXYCODONE HYDROCHLORIDE 5 MG: 5 TABLET ORAL at 09:05

## 2019-01-01 RX ADMIN — INSULIN ASPART 2 UNITS: 100 INJECTION, SOLUTION INTRAVENOUS; SUBCUTANEOUS at 12:05

## 2019-01-01 RX ADMIN — EPHEDRINE SULFATE 10 MG: 50 INJECTION, SOLUTION INTRAMUSCULAR; INTRAVENOUS; SUBCUTANEOUS at 08:03

## 2019-01-01 RX ADMIN — SODIUM POLYSTYRENE SULFONATE 30 G: 1 POWDER ORAL; RECTAL at 05:05

## 2019-01-01 RX ADMIN — Medication 3 MG: at 07:04

## 2019-01-01 RX ADMIN — Medication 30 MG: at 05:04

## 2019-01-01 RX ADMIN — Medication 10 MG: at 08:04

## 2019-01-01 RX ADMIN — HYDRALAZINE HYDROCHLORIDE 20 MG: 20 INJECTION INTRAMUSCULAR; INTRAVENOUS at 10:03

## 2019-01-01 RX ADMIN — DIPHENHYDRAMINE HYDROCHLORIDE 12.5 MG: 50 INJECTION INTRAMUSCULAR; INTRAVENOUS at 12:05

## 2019-01-01 RX ADMIN — TACROLIMUS 4 MG: 1 CAPSULE ORAL at 05:05

## 2019-01-01 RX ADMIN — SODIUM CHLORIDE 450 ML: 0.9 INJECTION, SOLUTION INTRAVENOUS at 10:03

## 2019-01-01 RX ADMIN — HYDRALAZINE HYDROCHLORIDE 75 MG: 50 TABLET ORAL at 01:04

## 2019-01-01 RX ADMIN — SODIUM CHLORIDE, SODIUM GLUCONATE, SODIUM ACETATE, POTASSIUM CHLORIDE, MAGNESIUM CHLORIDE, SODIUM PHOSPHATE, DIBASIC, AND POTASSIUM PHOSPHATE: .53; .5; .37; .037; .03; .012; .00082 INJECTION, SOLUTION INTRAVENOUS at 07:04

## 2019-01-01 RX ADMIN — NIFEDIPINE 30 MG: 30 TABLET, FILM COATED, EXTENDED RELEASE ORAL at 02:05

## 2019-01-01 RX ADMIN — NICARDIPINE HYDROCHLORIDE 5 MG/HR: 0.2 INJECTION, SOLUTION INTRAVENOUS at 05:04

## 2019-01-01 RX ADMIN — DEXAMETHASONE SODIUM PHOSPHATE 8 MG: 4 INJECTION, SOLUTION INTRAMUSCULAR; INTRAVENOUS at 08:03

## 2019-01-02 NOTE — TELEPHONE ENCOUNTER
----- Message from Xochilt Casper sent at 1/2/2019 11:46 AM CST -----  Contact: patient  Please call pt at [phone]    Patient would like to know the status of the kidney wait list?  Also patient has some excessive abdominal fluid with pain    Thank you

## 2019-01-02 NOTE — TELEPHONE ENCOUNTER
Spoke with pt. Advised that she is not listed for a kidney but is in the eval process. Discussed the reasons she was denied last time - noncompliance. Discussed the importance of remaining compliant with labs and all medications to show continued compliance.     While on phone, pt states she still has a lot of abdominal fluid. States she went back to dialysis in the clinic in order to get more fluid pulled off on a regular basis. Agreed to u/s to look for ascites.

## 2019-01-07 NOTE — TELEPHONE ENCOUNTER
----- Message from Nydia Head sent at 1/7/2019  8:51 AM CST -----      ----- Message -----  From: Christine Coello  Sent: 1/7/2019   8:46 AM  To: [facility] Pre-Kidney Transplant Non-Clinical    Pt calling to resched labs from today to Friday    Pt contact [phone]

## 2019-01-14 PROBLEM — D69.6 THROMBOCYTOPENIA: Status: RESOLVED | Noted: 2018-05-16 | Resolved: 2019-01-01

## 2019-01-14 NOTE — MEDICAL/APP STUDENT
Subjective:       Patient ID: Holly Patel is a 28 y.o. female.    Chief Complaint: Follow-up (3 month)    Ms. Patel is coming in for a 3mo follow up appointment. Her main concerns are abdominal bloating and new hair growth on her face, arms and hands. The bloating has been present for a few months, and it is slightly reduced after her dialysis treatments, however it does persist. An ultrasound was done on 10/11/2018 to determine if draining the fluid is an option. She has noticed increased hair growth since starting new blood pressure medications at the end of last year.       Review of Systems   Constitutional: Negative.    HENT: Positive for dental problem and ear pain.         R sided jaw pain with palpation near temple, and chewing.    Eyes: Negative.    Respiratory: Negative.    Cardiovascular:        Tachycardia during dialysis that seems to persist after treatment.   Gastrointestinal: Positive for abdominal distention and abdominal pain.        Occasional 5/10 stomach ache. GERD symptoms, waking her from sleep once a week.   Endocrine: Negative.    Genitourinary: Negative.    Musculoskeletal: Negative.    Skin: Negative.    Allergic/Immunologic: Negative.    Neurological: Negative.    Psychiatric/Behavioral: Negative.        Objective:      Physical Exam   Constitutional: She is oriented to person, place, and time. She is cooperative.   HENT:   Head: Normocephalic and atraumatic.   Right Ear: Hearing, tympanic membrane, external ear and ear canal normal.   Left Ear: Hearing, tympanic membrane, external ear and ear canal normal.   Eyes: Scleral icterus is present.   Neck:   Non-tender bilateral submandibular lymphadenopathy    Cardiovascular: Regular rhythm and intact distal pulses.   Murmur heard.   Systolic murmur is present.  Pulmonary/Chest: Effort normal and breath sounds normal.   Abdominal: Soft. Bowel sounds are normal. She exhibits distension and ascites. There is tenderness in the  epigastric area.   Neurological: She is alert and oriented to person, place, and time.   Skin: Skin is warm and dry.   Psychiatric: She has a normal mood and affect. Her behavior is normal. Judgment and thought content normal.       Assessment:   1. Abdominal Distention  2. Increased Hair Growth  3. Jaw Pain  4. GERD  Plan:   1. Abdominal Distention  - US scan done last week shows large volume of fluid in all four quadrants  - Refer to GI for drainage     2. Increased Hair Growth  - Possibly due to Minoxidil  - No further action taken at this time    3. Jaw Pain  - Unusual presentation for tension headache or sinusitis  - Unlikely temporal arteritis due to age  - No apparent TMJ involvement  - No further action taken at this time    4. GERD  - Prescribed famotidine 40mg

## 2019-01-14 NOTE — PROGRESS NOTES
Subjective:       Patient ID: Holly Patel is a 28 y.o. female.    Chief Complaint: Follow-up (3 month)    Patient is here for followup for chronic conditions.    abd distension relieves after HD.    Last week abd us showed ascites, waiting to hear from hepatology re management.    Increased hair growth face, arms/legs, she is not sure if med related.    Some R jaw pains and ear pains bilat.    HR jumps high to 120s during HD.    Midline stomach aches ease with relaxing.      Review of Systems   Constitutional: Positive for fatigue. Negative for activity change, diaphoresis, fever and unexpected weight change.   Respiratory: Positive for shortness of breath (stable). Negative for chest tightness and wheezing.    Cardiovascular: Negative for chest pain, palpitations and leg swelling.   Gastrointestinal: Positive for abdominal distention. Negative for nausea and vomiting.   Skin: Negative for rash and wound.   Neurological: Negative for tremors and weakness.        R sided frontal headache   Hematological: Negative for adenopathy. Does not bruise/bleed easily.   Psychiatric/Behavioral: Negative for confusion and dysphoric mood (ok off celexa).       Objective:      Physical Exam   Constitutional: She is oriented to person, place, and time. She appears well-developed and well-nourished. No distress.   HENT:   Head: Normocephalic and atraumatic.   Normal jaw open and closure. No focal temporal or TMJ tenderness to deep palpation bilat. TMs clear bilat   Eyes: Pupils are equal, round, and reactive to light. No scleral icterus.   Neck: Normal range of motion. No thyromegaly present.   Cardiovascular: Normal rate and regular rhythm. Exam reveals no gallop and no friction rub.   Murmur (mild HSM murmur at apex) heard.  Laying flat w/o any diff   Pulmonary/Chest: Effort normal and breath sounds normal. No respiratory distress. She has no wheezes. She has no rales.   Abdominal: Soft. Bowel sounds are normal. She  exhibits distension (moderate). She exhibits no mass. There is no tenderness. There is no rebound and no guarding.   Musculoskeletal: Normal range of motion. She exhibits no edema or tenderness.   L arm fistula thrill normal, nontender     Lymphadenopathy:     She has no cervical adenopathy.   Neurological: She is alert and oriented to person, place, and time.   Skin: She is not diaphoretic.   No lip hair growth, none on chest as well.     Psychiatric: She has a normal mood and affect. Her speech is normal and behavior is normal. Cognition and memory are normal.       Assessment:       1. Renovascular hypertension    2. Hematemesis with nausea        Plan:       Holly was seen today for follow-up.    Diagnoses and all orders for this visit:    Renovascular hypertension  -     carvedilol (COREG) 25 MG tablet; Take 1 tablet (25 mg total) by mouth 2 (two) times daily.    Abd pains and she does endorse GERD symptoms  -     famotidine (PEPCID) 40 MG tablet; Take 1 tablet (40 mg total) by mouth once daily.    Hair growth -- not c/w pathologic hormonal issue.    Headache on the R -- not sure the cause. She will call if symptoms worsen. Could be R tmj.    HTN not well controlled, she does not adhere well to meds -- Lengthy discussion re importance of btr med adh.    Holly was seen today for follow-up.    Diagnoses and all orders for this visit:    Depression, unspecified depression type    Renovascular hypertension  -     carvedilol (COREG) 25 MG tablet; Take 1 tablet (25 mg total) by mouth 2 (two) times daily.    Hematemesis with nausea  Comments:  resume Pepcid GI referral placed ER precautiosn given    Orders:  -     famotidine (PEPCID) 40 MG tablet; Take 1 tablet (40 mg total) by mouth once daily.    Liver replaced by transplant  Discuss US with hepatology    Acute non intractable tension-type headache  Could be cause of her R sided HA    Non compliance w medication regimen  Complicate her care.    Secondary  hyperparathyroidism/Elevated PTHrP level  she will discuss with HD team whether she needs surgery    Uncontrolled hypertension  As abobe      ESRD on hemodialysis  As above    Seizures  She is utd on seeing neuro    Immunosuppressed  From liver trans meds    Moderate protein-calorie malnutrition  Stable wt          Health Maintenance       Date Due Completion Date    Pap Smear 03/28/2021 3/28/2018    Pneumococcal Vaccine (Highest Risk) (3 of 3 - PPSV23) 09/26/2023 9/26/2018    TETANUS VACCINE 10/12/2028 10/12/2018          Follow-up in about 4 months (around 5/14/2019).    Future Appointments   Date Time Provider Department Center   1/23/2019 10:30 AM Adriane Rosario MD [facility]   1/28/2019  8:15 AM LAB, [facility]   3/12/2019  9:40 AM TED Crouch MD [facility]

## 2019-01-15 NOTE — TELEPHONE ENCOUNTER
----- Message from Lei Pinedo MD sent at 1/14/2019  2:37 PM CST -----  Yes she can    ----- Message -----  From: Violeta Francis RN  Sent: 1/11/2019   3:31 PM  To: Lei Pinedo MD    She wants to know if she can have a para?    ----- Message -----  From: Lei Pinedo MD  Sent: 1/11/2019   3:31 PM  To: [facility] Post-Liver Transplant Clinical    Results reviewed

## 2019-01-18 NOTE — H&P
Radiology History & Physical      SUBJECTIVE:     Chief Complaint: abdominal distention    History of Present Illness:  Holly Patel is a 28 y.o. female who presents for evaluation of abdominal distention  Past Medical History:   Diagnosis Date    Anemia in ESRD (end-stage renal disease) 10/12/2015    dialysis tues, thursday, sat; access left arm    Chronic rejection of liver transplant 3/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 at [facility] OR 2ND FLR    BIOPSY-LIVER N/A 2017    [facility]formed [facility] OR 2ND FLR    BIOPSY-LIVER N/A 3/22/2016    Performed by [facility] at [facility] OR 2ND FLR     SECTION      x 2    CONIZATION-CERVICAL-LEEP N/A 6/15/2018    Per[facility] OR    [id number]; upper extremity Left 2015    Performed by Idalia Diaz MD at [facility] OR 2ND FLR    EMBOLIZATION, BLOOD VESSEL N/A 2018    Performed by [facility]ren Ramos [facility] at [facility] CATH LAB    Exam Under Anesthesia N/A 2018    [facility]    Exam under  anesthesia N/A 6/19/2018    [facility] at [facility] OR    Exam under anesthesia (ADD ON ) N/A 7/9/2018    Performed by NICKIE Alvarez MD at [facility] OR    Exam under anesthesia -cervical suturing  N/A 7/26/2018    Performed by Lei Sims III, MD at [facility] OR    FISTULOGRAM Left 12/4/2015    Performed by [facility] at [facility] CATH LAB    LIVER BIOPSY      LIVER TRANSPLANT  09/1992    SUTURE REPAIR,CERVIX  6/19/2018    Performed by [facility] at [facility] OR    TUBAL LIGATION  2010       Home Meds:   Prior to Admission medications    Medication Sig Start Date End Date Taking? Authorizing Provider   aspirin 81 MG Chew Take 1 tablet (81 mg total) by mouth once daily. 2/21/18 2/21/19  Farheen Tellez MD   carvedilol (COREG) 25 MG tablet Take 1 tablet (25 mg total) by mouth 2 (two) times daily. 1/14/19   Stan Sosa MD   cinacalcet (SENSIPAR) 30 MG Tab Take 1 tablet (30 mg total) by mouth daily with breakfast.  Patient taking differently: Take 30 mg by mouth daily with breakfast. On Dialysis days 9/20/18 9/20/19  Kennedy Loco MD   famotidine (PEPCID) 40 MG tablet Take 1 tablet (40 mg total) by mouth once daily. 1/14/19   Stan Sosa MD   hydrALAZINE (APRESOLINE) 100 MG tablet Take 1 tablet (100 mg total) by mouth every 12 (twelve) hours. 12/5/18 2/3/19  Lei Pinedo MD   irbesartan (AVAPRO) 300 MG tablet Take 1 tablet (300 mg total) by mouth once daily. 12/5/18 2/3/19  Lei Pinedo MD   lacosamide (VIMPAT) 50 mg Tab Take 1 tablet (50 mg total) by mouth 2 (two) times daily. 10/24/18 10/24/19  Michael Mendoza MD   levETIRAcetam (KEPPRA) 500 MG Tab Take 1 tablet (500 mg total) by mouth 2 (two) times daily. 10/24/18 10/24/19  Michael Mendoza MD   minoxidil (LONITEN) 2.5 MG tablet Take 2 tablets (5 mg total) by mouth 2 (two) times daily. 12/5/18 2/3/19  Lei Pinedo MD   NIFEdipine (PROCARDIA-XL) 60 MG (OSM) 24 hr tablet Take 2 tablets  (120 mg total) by mouth once daily. 10/7/18 12/6/18  Prosper Greene MD   tacrolimus (PROGRAF) 1 MG Cap Take 6 capsules (6 mg total) by mouth every 12 (twelve) hours. 11/28/18 11/28/19  Lei Pinedo MD   triamcinolone acetonide 0.1% (KENALOG) 0.1 % ointment AAA on arms, legs, and neck bid x 1-2 wks then prn flares only 12/31/14   Diana Grider MD     Anticoagulants/Antiplatelets: aspirin    Allergies:   Review of patient's allergies indicates:   Allergen Reactions    Chloral hydrate Hallucinations     Other reaction(s): Hallucinations  Other reaction(s): Hives    Hydrocodone Other (See Comments)     Mental status changes     Sedation History:  no adverse reactions    Review of Systems:   Hematological: no known coagulopathies  Respiratory: no shortness of breath  Cardiovascular: no chest pain  Gastrointestinal: no abdominal pain  Genito-Urinary: no dysuria  Musculoskeletal: negative  Neurological: no TIA or stroke symptoms         OBJECTIVE:     Vital Signs (Most Recent)  Pulse: 80 (01/18/19 0700)  Resp: 18 (01/18/19 0700)  BP: (!) 153/80 (01/18/19 0700)  SpO2: 100 % (01/18/19 0700)    Physical Exam:  ASA: 2  Mallampati: na    General: no acute distress  Mental Status: alert and oriented to person, place and time  HEENT: normocephalic, atraumatic  Chest: unlabored breathing  Heart: regular heart rate  Abdomen: distended  Extremity: moves all extremities    Laboratory  Lab Results   Component Value Date    INR 1.1 11/16/2018       Lab Results   Component Value Date    WBC 2.92 (L) 01/11/2019    HGB 10.3 (L) 01/11/2019    HCT 32.5 (L) 01/11/2019    MCV 80 (L) 01/11/2019    PLT 82 (L) 01/11/2019      Lab Results   Component Value Date     01/11/2019     01/11/2019    K 3.4 (L) 01/11/2019     01/11/2019    CO2 26 01/11/2019    BUN 21 (H) 01/11/2019    CREATININE 6.5 (H) 01/11/2019    CALCIUM 8.1 (L) 01/11/2019    MG 2.3 10/07/2018    ALT 34 01/11/2019    AST 39 01/11/2019    ALBUMIN 3.1 (L)  01/11/2019    BILITOT 0.5 01/11/2019    BILIDIR 0.4 (H) 10/04/2018       ASSESSMENT/PLAN:     Sedation Plan: local anesthesia  Patient will undergo US guided paracentesis

## 2019-01-22 NOTE — TELEPHONE ENCOUNTER
----- Message from Maricarmen Wan sent at 1/22/2019 12:37 PM CST -----  Contact: pt   Name of Who is Calling: SYEDA BANKS [[id number]]       What is the request in detail:   Patient is requesting a call in regards to rescheduling her appointment....Please contact to further discuss and advise.       Can the clinic reply by [facility]: no       What Number to Call Back if not in [facility]:  [phone]

## 2019-01-30 NOTE — TELEPHONE ENCOUNTER
Letter sent, labs stable and no medication changes are needed. Reminded pt to stay compliant with medications. Repeat labs due 2/25/19 per Dr. Pinedo.

## 2019-02-18 NOTE — TELEPHONE ENCOUNTER
----- Message from Myrna Rivas RN sent at 2/15/2019 10:25 AM CST -----  Contact: patient       ----- Message -----  From: Fredi Mejia  Sent: 2/15/2019   9:46 AM  To: [facility] Post-Liver Transplant Clinical    Patient calling for updated information on her transplant.           Please call       Thanks!

## 2019-02-18 NOTE — TELEPHONE ENCOUNTER
Reviewed transplant workup progress with Holly. She is pending a follow up PAP with GYN and cardiology appt for BP. I spoke with her dialysis nurse and her most recent PTH was 4087 on 2/12. I have sent a message to Dr Bass to see what the plan is to address this as she will not be a transplant candidate until her PTH is below 1,000.

## 2019-02-21 NOTE — ED PROVIDER NOTES
Encounter Date: 2/20/2019    SCRIBE #1 NOTE: I, Steve Best, am scribing for, and in the presence of,  Dr. Ridley. I have scribed the following portions of the note - Other sections scribed: HPI, ROS, PE.       History     Chief Complaint   Patient presents with    abdominal and back pain     pt c/o abdominal and back pain starting today, pt denies n/v/d, reports does not make urine, due for dialysis tomorrow, denies taking medication for pain pta     This is a 28 year old female complaining of abdominal pain x 2 days, but worsening today. Patient is a experiencing mild nonproductive cough, but denies nausea, vomiting, and diarrhea. She reports not making urine and is due for dialysis tomorrow. Patient has not taken anything for pain pta.      The history is provided by the patient.     Review of patient's allergies indicates:   Allergen Reactions    Chloral hydrate Hallucinations     Other reaction(s): Hallucinations  Other reaction(s): Hives    Hydrocodone Other (See Comments)     Mental status changes     Past Medical History:   Diagnosis Date    Anemia in ESRD (end-stage renal disease) 10/12/2015    dialysis tues, thursday, sat; access left arm    Chronic rejection of liver transplant 3/22/2016    Depression     Encounter for blood transfusion     ESRD on hemodialysis 9/30/2015    History of recent hospitalization 05/2018    pneumonia    History of splenomegaly 4/12/2016    Immunosuppressed 8/5/2017    Iron deficiency anemia secondary to inadequate dietary iron intake 8/16/2017    She receives [facility] iron [facility] at the [facility].   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)    Moderate protein-calorie malnutrition 8/16/2017    MRSA bacteremia 8/6/2017    Pneumonia     Prophylactic immunotherapy 8/4/2014    Renovascular hypertension 10/2/2015    Secondary hyperparathyroidism 8/5/2017    Seizures     Sialadenitis 3/21/2018     Thrombocytopenia 2016     Past Surgical History:   Procedure Laterality Date    BIOPSY, LIVER, TRANSJUGULAR APPROACH N/A 2018    Performed by [facility] at [facility] OR 2ND FLR    BIOPSY-LIVER N/A 2017    Performed by [facility] at [facility] OR 2ND FLR    BIOPSY-LIVER N/A 3/22/2016    Performed by [facility] at [facility] OR 2ND FLR     SECTION      x 2    CONIZATION-CERVICAL-LEEP N/A 6/15/2018    Performed by [facility] at [facility] OR    [id number]; upper extremity Left 2015    Performed by Idalia Diaz MD at [facility] OR 2ND FLR    EMBOLIZATION, BLOOD VESSEL N/A 2018    Performed by [facility] Ramos [facility] at [facility] CATH LAB    Exam Under Anesthesia N/A 2018    Performed by [facility] at [facility] OR 2ND FLR    Exam under anesthesia N/A 2018    Performed by [facility] at [facility] OR    Exam under anesthesia (ADD ON ) N/A 2018    Performed by [facility]IE [facility]varez MD at [facility] OR    Exam under anesthesia -cervical suturing  N/A 2018    Performed by [facility]i [facility]s III, MD at [facility] OR    FISTULOGRAM Left 2015    Performed by [facility] at [facility] CATH LAB    LIVER BIOPSY      LIVER TRANSPLANT  1992    SUTURE REPAIR,CERVIX  2018    [facility]formed by [facility] at [facility] OR    TUBAL LIGATION       Family History   Problem Relation Age of Onset    Hypertension Mother     Hypertension Father     Cancer Sister     Heart attack Maternal Uncle     Melanoma Neg Hx     Breast cancer Neg Hx     Colon cancer Neg Hx     Ovarian cancer Neg Hx      Social History     Tobacco Use    Smoking status: Never Smoker    Smokeless tobacco: Never Used   Substance Use Topics    Alcohol use: No    Drug use: No     Review of Systems   Constitutional: Negative.  Negative for fever.   HENT: Negative.  Negative for sore throat.    Eyes: Negative.    Respiratory: Positive for cough  (mild). Negative for shortness of breath.    Cardiovascular: Negative.  Negative for chest pain.   Gastrointestinal: Positive for abdominal pain. Negative for diarrhea, nausea and vomiting.   Endocrine: Negative.    Genitourinary: Negative.  Negative for dysuria.   Musculoskeletal: Positive for back pain. Negative for myalgias.   Skin: Negative.  Negative for rash.   Allergic/Immunologic: Negative.    Neurological: Negative.  Negative for headaches.   Hematological: Negative.  Negative for adenopathy.   Psychiatric/Behavioral: Negative.  Negative for behavioral problems.   All other systems reviewed and are negative.      Physical Exam     Initial Vitals [02/20/19 2312]   BP Pulse Resp Temp SpO2   (!) 192/143 100 16 97.7 °F (36.5 °C) 97 %      MAP       --         Physical Exam    Nursing note and vitals reviewed.  Constitutional: She appears well-developed and well-nourished.   HENT:   Head: Normocephalic and atraumatic.   Right Ear: External ear normal.   Left Ear: External ear normal.   Nose: Nose normal.   Eyes: Conjunctivae are normal.   Neck: Normal range of motion. Neck supple.   Cardiovascular: Normal rate and intact distal pulses.   Pulmonary/Chest: Effort normal. No respiratory distress.       Abdominal: Soft. There is no tenderness.   Musculoskeletal: Normal range of motion.   Neurological: She is alert and oriented to person, place, and time.   Skin: Skin is warm and dry. Capillary refill takes less than 2 seconds.   Psychiatric: She has a normal mood and affect. Her behavior is normal.         ED Course   Procedures  Labs Reviewed - No data to display       Imaging Results    None          Medical Decision Making:   ED Management:  This patient is in an uric end-stage renal disease patient who presents to my emergency department with complaints of musculoskeletal type pain in her left lower back area.  Patient states that she had pain in her left upper abdominal area however the pain is more thoracic on  my examination and completely reproducible with direct palpation.  Patient's lung findings were completely clear and unremarkable.  Patient does have moderate amount of ascites but she is not markedly distended not having any respiratory difficulties associated with it.  Patient is not febrile.  I feel this patient's pain is musculoskeletal in etiology as it is completely reproducible and with direct palpation to the area.            Scribe Attestation:   Scribe #1: I performed the above scribed service and the documentation accurately describes the services I performed. I attest to the accuracy of the note.    This document was produced by a scribe under my direction and in my presence. I agree with the content of the note and have made any necessary edits.     Philip Ridley MD    02/20/2019 11:39 PM           Clinical Impression:     1. Musculoskeletal pain                                   Philip Ridley MD  02/20/19 [id number]

## 2019-02-25 NOTE — PROGRESS NOTES
"Subjective:   Patient ID:  Holly Patel is a 28 y.o. female is a new patient who presents for evaluation of Hypertension; Shortness of Breath; and Edema  Holly Patel is a 28 y.o. female is a new patient who presents for evaluation of Hypertension; Kidney Transplant Evaluation; and Shortness of Breath  Preoperative clearance for kidney transplant, ESRD, HTN, liver transplant in 2012      HPI:   HTN still uncontrolled and she wants to talk about that.   Does not exercise.   Significant MENDOSA.   No chest pain, Orthopnea, PND of heart failure symptoms.   Occasional palpitations.   Patient is feeling SOB along with palpitations that especially at the time of dialysis.   EKG: SR with LVH.       Patient Active Problem List   Diagnosis   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    Prophylactic immunotherapy    Acute nonintractable headache    ESRD on hemodialysis    Renovascular hypertension    Acute blood loss anemia    Anemia in ESRD (end-stage renal disease)    Non compliance w medication regimen    Immunosuppressed    Secondary hyperparathyroidism    Iron deficiency anemia secondary to inadequate dietary iron intake    Moderate protein-calorie malnutrition    Uncontrolled hypertension    Seizures    Hypervolemia    Weight loss, unintentional    Leukopenia    Chronic fatigue    Anemia of unknown etiology    Depression    Symptomatic anemia    Vaginal atrophy with desquamative inflammatory vaginitis    Hyponatremia    Hypertensive retinopathy of both eyes, grade 3    Elevated PTHrP level    Chronic kidney disease-mineral and bone disorder    Abnormal LFTs     BP (!) 186/108 (BP Location: Right arm, Patient Position: Sitting, BP Method: Small (Automatic))   Pulse 101   Ht 5' 5" (1.651 m)   Wt 52.6 kg (115 lb 15.4 oz)   BMI 19.30 kg/m²   Body mass index is 19.3 kg/m².  CrCl cannot be calculated (Patient's most recent lab result is older than the maximum 7 days allowed.).    Lab Results "   Component Value Date     (L) 01/28/2019    K 4.1 01/28/2019     01/28/2019    CO2 23 01/28/2019    BUN 46 (H) 01/28/2019    CREATININE 8.1 (H) 01/28/2019    GLU 85 01/28/2019    HGBA1C 4.1 09/20/2018    MG 2.3 10/07/2018    AST 45 (H) 01/28/2019    ALT 33 01/28/2019    ALBUMIN 3.0 (L) 01/28/2019    PROT 7.9 01/28/2019    BILITOT 0.9 01/28/2019    WBC 3.04 (L) 01/28/2019    HGB 10.7 (L) 01/28/2019    HCT 33.0 (L) 01/28/2019    HCT 25 (L) 09/18/2018    MCV 79 (L) 01/28/2019    PLT 55 (L) 01/28/2019    INR 1.1 11/16/2018    TSH 2.623 11/15/2018    CHOL 215 (H) 09/26/2018    HDL 67 09/26/2018    LDLCALC 137.6 09/26/2018    TRIG 52 09/26/2018       Current Outpatient Medications   Medication Sig    carvedilol (COREG) 25 MG tablet Take 1 tablet (25 mg total) by mouth 2 (two) times daily.    cinacalcet (SENSIPAR) 30 MG Tab Take 1 tablet (30 mg total) by mouth daily with breakfast. (Patient taking differently: Take 30 mg by mouth daily with breakfast. On Dialysis days)    famotidine (PEPCID) 40 MG tablet Take 1 tablet (40 mg total) by mouth once daily.    lacosamide (VIMPAT) 50 mg Tab Take 1 tablet (50 mg total) by mouth 2 (two) times daily.    levETIRAcetam (KEPPRA) 500 MG Tab Take 1 tablet (500 mg total) by mouth 2 (two) times daily.    sevelamer carbonate (RENVELA) 800 mg Tab Take 1 tablet (800 mg total) by mouth 3 (three) times daily with meals.    tacrolimus (PROGRAF) 1 MG Cap Take 6 capsules (6 mg total) by mouth every 12 (twelve) hours.    traMADol (ULTRAM) 50 mg tablet Take 1 tablet (50 mg total) by mouth every 6 (six) hours as needed for Pain.    triamcinolone acetonide 0.1% (KENALOG) 0.1 % ointment AAA on arms, legs, and neck bid x 1-2 wks then prn flares only    aspirin 81 MG Chew Take 1 tablet (81 mg total) by mouth once daily.    hydrALAZINE (APRESOLINE) 100 MG tablet Take 1 tablet (100 mg total) by mouth every 8 (eight) hours.    irbesartan (AVAPRO) 300 MG tablet Take 1 tablet (300 mg  total) by mouth once daily.    verapamil (CALAN-SR) 240 MG CR tablet Take 1 tablet (240 mg total) by mouth every evening.     No current facility-administered medications for this visit.        Review of Systems   Constitution: Positive for weakness and malaise/fatigue. Negative for chills, decreased appetite, night sweats, weight gain and weight loss.   Eyes: Negative for blurred vision, double vision, visual disturbance and visual halos.   Cardiovascular: Positive for dyspnea on exertion. Negative for chest pain, claudication, cyanosis, irregular heartbeat, leg swelling, near-syncope, orthopnea, palpitations, paroxysmal nocturnal dyspnea and syncope.   Respiratory: Positive for shortness of breath. Negative for cough, hemoptysis, snoring, sputum production and wheezing.    Endocrine: Negative for cold intolerance, heat intolerance, polydipsia and polyphagia.   Hematologic/Lymphatic: Negative for adenopathy and bleeding problem. Does not bruise/bleed easily.   Skin: Negative for flushing, itching, poor wound healing and rash.   Musculoskeletal: Negative for arthritis, back pain, falls, gout, joint pain, joint swelling, muscle cramps, muscle weakness, myalgias, neck pain and stiffness.   Gastrointestinal: Negative for bloating, abdominal pain, anorexia, diarrhea, dysphagia, excessive appetite, flatus, hematemesis, jaundice, melena and nausea.   Genitourinary: Negative for hesitancy and incomplete emptying.   Neurological: Negative for aphonia, brief paralysis, difficulty with concentration, disturbances in coordination, excessive daytime sleepiness, dizziness, focal weakness, light-headedness and loss of balance.   Psychiatric/Behavioral: Negative for altered mental status, depression, hallucinations, hypervigilance, memory loss, substance abuse and suicidal ideas. The patient does not have insomnia and is not nervous/anxious.        Objective:   Physical Exam   Constitutional: She is oriented to person, place, and  time. She appears well-developed and well-nourished. No distress.   HENT:   Head: Normocephalic and atraumatic.   Nose: Nose normal.   Mouth/Throat: Oropharynx is clear and moist. No oropharyngeal exudate.   Eyes: Conjunctivae and EOM are normal. Pupils are equal, round, and reactive to light. Right eye exhibits no discharge. Left eye exhibits no discharge. No scleral icterus.   Neck: Normal range of motion. Neck supple. No JVD present. No tracheal deviation present. No thyromegaly present.   Cardiovascular: Normal rate, regular rhythm and intact distal pulses. Exam reveals no gallop and no friction rub.   Murmur (loud systolic upper sternal border) heard.  Pulmonary/Chest: Effort normal and breath sounds normal. No stridor. No respiratory distress. She has no wheezes. She has no rales. She exhibits no tenderness.   Abdominal: Soft. Bowel sounds are normal. She exhibits no distension and no mass. There is no tenderness. There is no rebound and no guarding.   Musculoskeletal: Normal range of motion. She exhibits no edema or tenderness.   Lymphadenopathy:     She has no cervical adenopathy.   Neurological: She is alert and oriented to person, place, and time. She has normal reflexes. No cranial nerve deficit. She exhibits normal muscle tone. Coordination normal.   Skin: Skin is warm. No rash noted. She is not diaphoretic. No erythema. No pallor.   Psychiatric: She has a normal mood and affect. Her behavior is normal. Judgment and thought content normal.       Assessment:     1. Uncontrolled hypertension    2. Hypertension, unspecified type    3. Renovascular hypertension    4. ESRD (end stage renal disease) on dialysis    5. Liver replaced by transplant        Plan:   Holly was seen today for hypertension, shortness of breath and edema.    Diagnoses and all orders for this visit:    Uncontrolled hypertension    Hypertension, unspecified type  -     hydrALAZINE (APRESOLINE) 100 MG tablet; Take 1 tablet (100 mg  total) by mouth every 8 (eight) hours.    Renovascular hypertension  -     carvedilol (COREG) 25 MG tablet; Take 1 tablet (25 mg total) by mouth 2 (two) times daily.    ESRD (end stage renal disease) on dialysis    Liver replaced by transplant    Other orders  -     verapamil (CALAN-SR) 240 MG CR tablet; Take 1 tablet (240 mg total) by mouth every evening.    stop nifedipine, start verapamil and increase hydralazine to q8 stop minoxidil as it is causes hypertrichosis (she is on a really small dose and that is possibly not very effective).

## 2019-02-26 NOTE — HPI
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 Hepatology is being consulted for management of immunosuppression.    Patient does not recall her regimen, but she states that she takes her medications as stated on the list.  Patient current regimen Cellcept 1000/1000, prograf 7/7, and prednisone 1mg BID. Patient had inconsistencies in her medical regimen in the past, and it is not clear if she should be on Cellcept. The patient has history of non-compliance with medications in the past. She has been on prograf 1/1 a few months ago.   Universal Safety Interventions

## 2019-02-27 NOTE — TELEPHONE ENCOUNTER
Letter sent, labs stable and no medication changes are needed. Reminded pt to remain compliant with all medications as tac level undetected again. Repeat labs due 3/25/19 per protocol.

## 2019-02-27 NOTE — PROGRESS NOTES
Subjective:       Patient ID: Holly Patel is a 28 y.o. female.    Chief Complaint:  Follow-up (repeat pap )      History of Present Illness  HPI  This 28 yr old P2 female is here for repeat pap after LEEP procedure.  She had Leep in 2018 with neg results.  She had trouble with bleeding afterwards and had to be retreated several times.  She is on the transplant list and is here for her first pap after LEEP and needs clearance for her transplant.    GYN & OB History  No LMP recorded. Patient is not currently having periods (Reason: Other).   Date of Last Pap: 2018    OB History    Para Term  AB Living   2 2 0 2 0 2   SAB TAB Ectopic Multiple Live Births   0 0 0   2      # Outcome Date GA Lbr Peter/2nd Weight Sex Delivery Anes PTL Lv   2       CS-LTranv   JOANN   1       CS-LTranv   JOANN          Review of Systems  Review of Systems   Constitutional: Negative for chills and fever.   Respiratory: Negative for shortness of breath.    Cardiovascular: Negative for chest pain.   Gastrointestinal: Negative for abdominal pain, nausea and vomiting.   Genitourinary: Negative for difficulty urinating, dyspareunia, genital sores, menstrual problem, pelvic pain, vaginal bleeding, vaginal discharge and vaginal pain.   Skin: Negative for wound.   Hematological: Negative for adenopathy.           Objective:   Physical Exam  Cervix still healing and friable.  Can see the results of leep  No open bleeding but friable     Assessment:        1. History of abnormal cervical Pap smear               Plan:      Repeat pa # 1 of three.  Needs clearance for her transplant

## 2019-02-27 NOTE — LETTER
February 27, 2019    Holly Patel  [address] [place] [id number]          Dear Holly Patel:  MRN: [id number]    This is a follow up to your recent labs, your lab results were stable.  There are no medicine changes.  Please have your labs drawn again on 3/25/19. Your Prograf/tacrolimus level was undetected again. Please remember to take all medications as ordered and at the correct times to prevent health issues and chronic rejection.      If you cannot have your labs drawn on the scheduled date, it is your responsibility to call the transplant department to reschedule.  To reschedule or make an appointment, please as to speak to or leave a message for my assistant, Tara Pollack or [place], at [phone].  When leaving a message for Tara Pollack Angela or myself, we ask that you leave a brief message regarding your request.    Sincerely,      Violeta Francis, RN, BSN, [facility]  Liver Transplant Coordinator  [facility]  [address] [place] [id number] [phone]

## 2019-03-07 NOTE — TELEPHONE ENCOUNTER
Called pt to advised of NM Parathyroid Scan tomorrow @ 1p and 3p. 2nd fl radiology dept at [facility]

## 2019-03-07 NOTE — H&P (VIEW-ONLY)
The patient presents for follow-up and discussion of possible parathyroid surgery.  She has had persistently elevated BP requiring visits to the ED/hospitalization in addition to questionable medication compliance(undetectable immunosuppression).    The patient gets Sensipar with HD though has a persistently elevated PTH level.  Nephrology would like to consider subtotal parathyroidectomy.  The patient is amenable.  We will follow-up to ensure the patient has been medically optimized based on multiple co-morbid conditions.  Follow-up sestamibi scan.    I discussed the nature of the disease process and the risk of surgery including failure to localize the parathyroid gland, persistent or recurrent hyperparathyroidism with the possibility of the need for a second surgery, temporary or permanent hypoparathyroidism resulting in low blood calcium levels that require extensive medication to avoid serious degenerative conditions such as cataracts, brittle bones, muscle weakness and muscle irritability.  Other risks include bleeding, infection, injury to the recurrent laryngeal nerves resulting in hoarseness or impairment of speech and the risks of general anesthetic including MI, CVA, sudden death or even reaction to anesthetic medications. The patient understands the risks, any and all questions were answered to the patient's satisfaction. Written consent was obtained.    See the original consult note below:    Consult Note  Endocrine Surgery    Visit Diagnosis: Hyperparathyroidism [E21.3]    SUBJECTIVE:     Patient is a 28 y.o. female who was referred by Dr. Viktor Bass and is here with mother  and presents for evaluation of secondary hyperparathyroidism.   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  Per their report the patient was noted to have severe HTN resulting in ESRD starting on HD in 2015. Currently undergoes HD days MWF at [facility].  The  patient has had complaints of hyperparathyroidism. There is no history of lithium or thiazide medication use. She has not had previous history of head or neck radiation. She denies sleep disturbance, joint pain, abdominal pain, weakness and increased thirst. Furthermore, there is no history of pathologic fractures, malignancy, or personal history of thyroid, adrenal, or pancreatic abnormalities. Unknown current Vitamin D status. She does not have familial history of endocrinopathies.    Takes Sensipar 30mg daily on .  No dose change following recent lab(PTH) draw.      Review of patient's allergies indicates:   Allergen Reactions    Chloral hydrate      Other reaction(s): Hallucinations  Other reaction(s): Hives    Hydrocodone Other (See Comments)     Mental status changes       Current Outpatient Medications   Medication Sig Dispense Refill    aspirin 81 MG Chew Take 1 tablet (81 mg total) by mouth once daily.  0    carvedilol (COREG) 25 MG tablet Take 1 tablet (25 mg total) by mouth 2 (two) times daily. 60 tablet 11    cinacalcet (SENSIPAR) 30 MG Tab Take 1 tablet (30 mg total) by mouth daily with breakfast. (Patient taking differently: Take 30 mg by mouth daily with breakfast. On Dialysis days) 30 tablet 2    citalopram (CELEXA) 20 MG tablet TAKE 1 TABLET BY MOUTH EVERY DAY 30 tablet 1    famotidine (PEPCID) 40 MG tablet Take 0.5 tablets (20 mg total) by mouth once daily. 15 tablet 11    hydrALAZINE (APRESOLINE) 100 MG tablet Take 1 tablet (100 mg total) by mouth every 12 (twelve) hours. 60 tablet 1    irbesartan (AVAPRO) 300 MG tablet Take 1 tablet (300 mg total) by mouth once daily. 30 tablet 1    lacosamide (VIMPAT) 50 mg Tab Take 1 tablet (50 mg total) by mouth 2 (two) times daily. 60 tablet 11    levETIRAcetam (KEPPRA) 500 MG Tab Take 1 tablet (500 mg total) by mouth 2 (two) times daily. 60 tablet 11    minoxidil (LONITEN) 2.5 MG tablet Take 2 tablets (5 mg total) by mouth 2 (two) times daily.  120 tablet 1    NIFEdipine (PROCARDIA-XL) 60 MG (OSM) 24 hr tablet Take 2 tablets (120 mg total) by mouth once daily. 60 tablet 1    ondansetron (ZOFRAN) 4 MG tablet Take 1 tablet (4 mg total) by mouth every 6 (six) hours as needed for Nausea. (Patient taking differently: Take 4 mg by mouth once daily. ) 15 tablet 0    tacrolimus (PROGRAF) 1 MG Cap Take 6 capsules (6mg) in the morning and 6 capsules (6mg) in the evening 450 capsule 0    triamcinolone acetonide 0.1% (KENALOG) 0.1 % ointment AAA on arms, legs, and neck bid x 1-2 wks then prn flares only 80 g 3     No current facility-administered medications for this visit.        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 ()    Moderate protein-calorie malnutrition 2017    MRSA bacteremia 2017    Pneumonia     Prophylactic immunotherapy 2014    Renovascular hypertension 10/2/2015    Secondary hyperparathyroidism 2017    Seizures     Sialadenitis 3/21/2018    Thrombocytopenia 2016     Past Surgical History:   Procedure Laterality Date    BIOPSY-LIVER N/A 2017    Performed [facility] OR 2ND FLR    BIOPSY-LIVER N/A 3/22/2016    Performed by [facility] Provider at [facility] OR 2ND FLR     SECTION      x 2    CONIZATION OF CERVIX USING LOOP ELECTROSURGICAL EXCISION PROCEDURE (LEEP) N/A 6/15/2018    Procedure:  CONIZATION-CERVICAL-LEEP;  Surgeon: Neelam Marroquin MD;  Location: [facility] OR;  Service: OB/GYN;  Laterality: N/A;    CONIZATION-CERVICAL-LEEP N/A 6/15/2018    Performed by Neelam Marroquin MD at [facility] OR    [id number]; upper extremity Left 7/17/2015    Performed by Idalia Diaz MD at [facility] OR 2ND FLR    EMBOLIZATION N/A 7/21/2018    Procedure: EMBOLIZATION, BLOOD VESSEL;  Surgeon: Aren Ramos MD;  Location: [facility] CATH LAB;  Service: Radiology;  Laterality: N/A;    EMBOLIZATION, BLOOD VESSEL N/A 7/21/2018    Performed by Aren Ramos MD at [facility] CATH LAB    Exam Under Anesthesia N/A 8/8/2018    Performed by [facility]quin [facility] at [facility] OR 2ND FLR    Exam under anesthesia N/A 6/19/2018    Performed by Ne[facility]am Marroquin MD at [facility] OR    Exam under anesthesia (ADD ON ) N/A 7/9/2018    Performed by NCIKIE Alvarez MD at [facility] OR    Exam under anesthesia -cervical suturing  N/A 7/26/2018    Performed by eLi Sims III, MD at [facility] OR    EXAMINATION UNDER ANESTHESIA N/A 6/19/2018    Procedure: Exam under anesthesia;  Surgeon: Neelam Marroquin MD;  Location: [facility];  Service: OB/GYN;  Laterality: N/A;    EXAMINATION UNDER ANESTHESIA N/A 7/9/2018    Procedure: Exam under anesthesia (ADD ON );  Surgeon: NICKIE Alvarez MD;  Location: [facility];  Service: OB/GYN;  Laterality: N/A;  (ADD ON )    EXAMINATION UNDER ANESTHESIA N/A 7/26/2018    Procedure: Exam under anesthesia -cervical suturing ;  Surgeon: Lei Sims III, MD;  Location: [facility];  Service: OB/GYN;  Laterality: N/A;    EXAMINATION UNDER ANESTHESIA N/A 8/8/2018    Procedure: Exam Under Anesthesia;  Surgeon: Neelam Marroquin MD;  Location: [facility] OR 2ND FLR;  Service: OB/GYN;  Laterality: N/A;  need endoloop  request 12 noon    FISTULOGRAM Left 12/4/2015    Performed by Idalia Diaz MD at [facility] CATH LAB    LIVER BIOPSY      LIVER TRANSPLANT  09/1992    PERINEORRHAPHY  6/19/2018     "Procedure: SUTURE REPAIR,CERVIX;  Surgeon: Neelam Marroquin MD;  Location: [facility] OR;  Service: OB/GYN;;    SUTURE REPAIR,CERVIX  6/19/2018    Performed by [facility]quin [facility] at [facility] OR    TUBAL LIGATION  2010     Social History     Tobacco Use    Smoking status: Never Smoker    Smokeless tobacco: Never Used   Substance Use Topics    Alcohol use: No    Drug use: No          Review of Systems:    Review of Systems   Constitutional: negative for chills, fatigue, fevers, malaise and night sweats  Eyes: negative for icterus and irritation  Respiratory: negative for cough and dyspnea on exertion  Cardiovascular: negative for chest pain and palpitations  Gastrointestinal: negative for constipation, diarrhea and dysphagia  Genitourinary:negative for dysuria, hematuria and nocturia  Integument/breast: negative for rash and skin color change  Hematologic/lymphatic: negative for bleeding and easy bruising  Neurological: negative for gait problems, headaches and seizures  Behavioral/Psych: negative for anxiety and depression  Endocrine: negative    ENT ROS: negative  Endocrine ROS:   negative for - hair pattern changes, malaise/lethargy, mood swings, palpitations or polydipsia/polyuria      OBJECTIVE:     Vital Signs:  BP (!) 157/93   Pulse 86   Temp 97.3 °F (36.3 °C)   Resp 18   Ht 5' 5" (1.651 m)   Wt 53 kg (116 lb 13.5 oz)   BMI 19.44 kg/m²    Body mass index is 19.44 kg/m².      Physical Exam    General:  no distress, see vitals for BMI    Eyes:  conjunctivae/corneas clear   Neck: trachea midline and symmetric, no adenopathy    Thyroid:  thyroid not enlarged   Lung: clear to auscultation bilaterally   Heart:  regular rate and rhythm   Abdomen: soft, non-tender; bowel sounds normal; no masses,  no organomegaly   Skin/Extremities: warm and well-perfused   Pulses: 2+ and symmetric   Neuro: normal without focal findings and mental status, speech normal, alert and oriented x3       Imaging    Studies:  Date:  "      Results:     DEXA:   11/5/2018 Spine T Score: -1.7, Hip T Score: -2.1,   Femoral neck T Score: -1.9(left), Distal radius (Forearm) T Score: -4.1(right)   Ultrasound:  11/5/2018 The right lobe of the thyroid gland measures 4.5 x 1.8 x 1.9 cm and the left lobe of the thyroid gland measures 4.6 x 1.7 x 1.4 cm.  The isthmus measures 0.3 cm in thickness.  There are hypoechoic nodules posterior to the cephalad aspects of the thyroid gland measuring 1.6 x 0.4 cm on the right and 0.9 x 0.6 cm on the left likely representing bilateral parathyroid adenomas.  No cervical adenopathy is identified.       Lab Review        Component Value Date    Vit D, 25-Hydroxy 13 (L) 10/02/2015    PTH, Intact 1,429.0 (H) 09/26/2018    Calcium 8.1 (L) 10/26/2018    Phosphorus 4.6 (H) 10/07/2018    TSH 1.150 09/18/2018    Free T4 1.31 05/16/2018           ASSESSMENT/PLAN:       Assessment    Holly Patel is an 28 y.o. female who presents with secondary hyperparathyroidism, likely due to parathyroid hyperplasia. The above testing and examination support the diagnosis. There is a question as to whether the patient will be able to be listed for a transplant(kidney).   She is currently not on maximal medical treatment currently.      Plan    1. Imaging: will  consider a Sestamebi scan if determined that parathyroid surgery is indicated.  2. Medications: patient should continue current medications: Cinacalcet 3 times a week.  3. The natural history and treatment options for hyperparathyroidism were discussed with the patient. Parathyroidectomy is the only curative treatment for primary hyperparathyroidism. Parathyroidectomy, with standard four-gland exploration, and its risks were discussed. I was also able to discuss the expected no-operative course and the risks of surgery including failure to localize the parathyroid gland, persistent or recurrent hyperparathyroidism with the possibility of the need for a second surgery,  "temporary or permanent hypoparathyroidism resulting in low blood calcium levels that require extensive medication to avoid serious degenerative conditions such as cataracts, brittle bones, muscle weakness and muscle irritability. Other risks include bleeding, infection, injury to the recurrent laryngeal nerves resulting in hoarseness or impairment of speech and the risks of general anesthetic including MI, CVA, sudden death or even reaction to anesthetic medications.The role of intraoperative PTH monitoring was also discussed. The patient understands the risks, any and all questions were answered to the patients satisfaction. The patient is amenable to surgery.   4. Will ensure that patient is medically optimized prior to procedure. In addition, the patient was given our "Understanding Parathyroid Disease" booklet and directed to the [employer] Patient Education portal as a resource.   "

## 2019-03-07 NOTE — PROGRESS NOTES
The patient presents for follow-up and discussion of possible parathyroid surgery.  She has had persistently elevated BP requiring visits to the ED/hospitalization in addition to questionable medication compliance(undetectable immunosuppression).    The patient gets Sensipar with HD though has a persistently elevated PTH level.  Nephrology would like to consider subtotal parathyroidectomy.  The patient is amenable.  We will follow-up to ensure the patient has been medically optimized based on multiple co-morbid conditions.  Follow-up sestamibi scan.    I discussed the nature of the disease process and the risk of surgery including failure to localize the parathyroid gland, persistent or recurrent hyperparathyroidism with the possibility of the need for a second surgery, temporary or permanent hypoparathyroidism resulting in low blood calcium levels that require extensive medication to avoid serious degenerative conditions such as cataracts, brittle bones, muscle weakness and muscle irritability.  Other risks include bleeding, infection, injury to the recurrent laryngeal nerves resulting in hoarseness or impairment of speech and the risks of general anesthetic including MI, CVA, sudden death or even reaction to anesthetic medications. The patient understands the risks, any and all questions were answered to the patient's satisfaction. Written consent was obtained.    See the original consult note below:    Consult Note  Endocrine Surgery    Visit Diagnosis: Hyperparathyroidism [E21.3]    SUBJECTIVE:     Patient is a 28 y.o. female who was referred by Dr. Viktor Bass and is here with mother  and presents for evaluation of secondary hyperparathyroidism.   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  Per their report the patient was noted to have severe HTN resulting in ESRD starting on HD in 2015. Currently undergoes HD days MWF at [facility].  The  patient has had complaints of hyperparathyroidism. There is no history of lithium or thiazide medication use. She has not had previous history of head or neck radiation. She denies sleep disturbance, joint pain, abdominal pain, weakness and increased thirst. Furthermore, there is no history of pathologic fractures, malignancy, or personal history of thyroid, adrenal, or pancreatic abnormalities. Unknown current Vitamin D status. She does not have familial history of endocrinopathies.    Takes Sensipar 30mg daily on .  No dose change following recent lab(PTH) draw.      Review of patient's allergies indicates:   Allergen Reactions    Chloral hydrate      Other reaction(s): Hallucinations  Other reaction(s): Hives    Hydrocodone Other (See Comments)     Mental status changes       Current Outpatient Medications   Medication Sig Dispense Refill    aspirin 81 MG Chew Take 1 tablet (81 mg total) by mouth once daily.  0    carvedilol (COREG) 25 MG tablet Take 1 tablet (25 mg total) by mouth 2 (two) times daily. 60 tablet 11    cinacalcet (SENSIPAR) 30 MG Tab Take 1 tablet (30 mg total) by mouth daily with breakfast. (Patient taking differently: Take 30 mg by mouth daily with breakfast. On Dialysis days) 30 tablet 2    citalopram (CELEXA) 20 MG tablet TAKE 1 TABLET BY MOUTH EVERY DAY 30 tablet 1    famotidine (PEPCID) 40 MG tablet Take 0.5 tablets (20 mg total) by mouth once daily. 15 tablet 11    hydrALAZINE (APRESOLINE) 100 MG tablet Take 1 tablet (100 mg total) by mouth every 12 (twelve) hours. 60 tablet 1    irbesartan (AVAPRO) 300 MG tablet Take 1 tablet (300 mg total) by mouth once daily. 30 tablet 1    lacosamide (VIMPAT) 50 mg Tab Take 1 tablet (50 mg total) by mouth 2 (two) times daily. 60 tablet 11    levETIRAcetam (KEPPRA) 500 MG Tab Take 1 tablet (500 mg total) by mouth 2 (two) times daily. 60 tablet 11    minoxidil (LONITEN) 2.5 MG tablet Take 2 tablets (5 mg total) by mouth 2 (two) times daily.  120 tablet 1    NIFEdipine (PROCARDIA-XL) 60 MG (OSM) 24 hr tablet Take 2 tablets (120 mg total) by mouth once daily. 60 tablet 1    ondansetron (ZOFRAN) 4 MG tablet Take 1 tablet (4 mg total) by mouth every 6 (six) hours as needed for Nausea. (Patient taking differently: Take 4 mg by mouth once daily. ) 15 tablet 0    tacrolimus (PROGRAF) 1 MG Cap Take 6 capsules (6mg) in the morning and 6 capsules (6mg) in the evening 450 capsule 0    triamcinolone acetonide 0.1% (KENALOG) 0.1 % ointment AAA on arms, legs, and neck bid x 1-2 wks then prn flares only 80 g 3     No current facility-administered medications for this visit.        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 ()    Moderate protein-calorie malnutrition 2017    MRSA bacteremia 2017    Pneumonia     Prophylactic immunotherapy 2014    Renovascular hypertension 10/2/2015    Secondary hyperparathyroidism 2017    Seizures     Sialadenitis 3/21/2018    Thrombocytopenia 2016     Past Surgical History:   Procedure Laterality Date    BIOPSY-LIVER N/A 2017    Performed by [facility] OR 2ND FLR    BIOPSY-LIVER N/A 3/22/2016    Performed by [facility] Diagnostic Provider at [facility] OR 2ND FLR     SECTION      x 2    CONIZATION OF CERVIX USING LOOP ELECTROSURGICAL EXCISION PROCEDURE (LEEP) N/A 6/15/2018    Procedure:  CONIZATION-CERVICAL-LEEP;  Surgeon: Neelam Marroquin MD;  Location: [facility] OR;  Service: OB/GYN;  Laterality: N/A;    CONIZATION-CERVICAL-LEEP N/A 6/15/2018    Performed by Neelam Marroquin MD at [facility] OR    [id number]; upper extremity Left 7/17/2015    Performed by Idalia Diaz MD at [facility] OR 2ND FLR    EMBOLIZATION N/A 7/21/2018    Procedure: EMBOLIZATION, BLOOD VESSEL;  Surgeon: Aren Ramos MD;  Location: [facility] CATH LAB;  Service: Radiology;  Laterality: N/A;    EMBOLIZATION, BLOOD VESSEL N/A 7/21/2018    Performed by Aren Ramos MD at [facility] CATH LAB    Exam Under Anesthesia N/A 8/8/2018    Performed by [facility]elam [facility]roquin MD at [facility] OR 2ND FLR    Exam under anesthesia N/A 6/19/2018    Performed by Neelam Marroquin MD at [facility] OR    Exam under anesthesia (ADD ON ) N/A 7/9/2018    Performed by NICKIE Alvarez MD at [facility] OR    Exam under anesthesia -cervical suturing  N/A 7/26/2018    Performed by Lei Sims III, MD at [facility] OR    EXAMINATION UNDER ANESTHESIA N/A 6/19/2018    Procedure: Exam under anesthesia;  Surgeon: Neelam Marroquin MD;  Location: [facility];  Service: OB/GYN;  Laterality: N/A;    EXAMINATION UNDER ANESTHESIA N/A 7/9/2018    Procedure: Exam under anesthesia (ADD ON );  Surgeon: NICKIE Alvarez MD;  Location: [facility];  Service: OB/GYN;  Laterality: N/A;  (ADD ON )    EXAMINATION UNDER ANESTHESIA N/A 7/26/2018    Procedure: Exam under anesthesia -cervical suturing ;  Surgeon: Lei Sims III, MD;  Location: [facility];  Service: OB/GYN;  Laterality: N/A;    EXAMINATION UNDER ANESTHESIA N/A 8/8/2018    Procedure: Exam Under Anesthesia;  Surgeon: Neelam Marroquin MD;  Location: [facility] OR 2ND FLR;  Service: OB/GYN;  Laterality: N/A;  need endoloop  request 12 noon    FISTULOGRAM Left 12/4/2015    Performed by Idalia Diaz MD at [facility] CATH LAB    LIVER BIOPSY      LIVER TRANSPLANT  09/1992    PERINEORRHAPHY  6/19/2018     "Procedure: SUTURE REPAIR,CERVIX;  Surgeon: Neelam Marroquin MD;  Location: [facility] OR;  Service: OB/GYN;;    SUTURE REPAIR,CERVIX  6/19/2018    Performed by Neelam Marroquin MD at [facility] OR    TUBAL LIGATION  2010     Social History     Tobacco Use    Smoking status: Never Smoker    Smokeless tobacco: Never Used   Substance Use Topics    Alcohol use: No    Drug use: No          Review of Systems:    Review of Systems   Constitutional: negative for chills, fatigue, fevers, malaise and night sweats  Eyes: negative for icterus and irritation  Respiratory: negative for cough and dyspnea on exertion  Cardiovascular: negative for chest pain and palpitations  Gastrointestinal: negative for constipation, diarrhea and dysphagia  Genitourinary:negative for dysuria, hematuria and nocturia  Integument/breast: negative for rash and skin color change  Hematologic/lymphatic: negative for bleeding and easy bruising  Neurological: negative for gait problems, headaches and seizures  Behavioral/Psych: negative for anxiety and depression  Endocrine: negative    ENT ROS: negative  Endocrine ROS:   negative for - hair pattern changes, malaise/lethargy, mood swings, palpitations or polydipsia/polyuria      OBJECTIVE:     Vital Signs:  BP (!) 157/93   Pulse 86   Temp 97.3 °F (36.3 °C)   Resp 18   Ht 5' 5" (1.651 m)   Wt 53 kg (116 lb 13.5 oz)   BMI 19.44 kg/m²    Body mass index is 19.44 kg/m².      Physical Exam    General:  no distress, see vitals for BMI    Eyes:  conjunctivae/corneas clear   Neck: trachea midline and symmetric, no adenopathy    Thyroid:  thyroid not enlarged   Lung: clear to auscultation bilaterally   Heart:  regular rate and rhythm   Abdomen: soft, non-tender; bowel sounds normal; no masses,  no organomegaly   Skin/Extremities: warm and well-perfused   Pulses: 2+ and symmetric   Neuro: normal without focal findings and mental status, speech normal, alert and oriented x3       Imaging    Studies:  Date:  "      Results:     DEXA:   11/5/2018 Spine T Score: -1.7, Hip T Score: -2.1,   Femoral neck T Score: -1.9(left), Distal radius (Forearm) T Score: -4.1(right)   Ultrasound:  11/5/2018 The right lobe of the thyroid gland measures 4.5 x 1.8 x 1.9 cm and the left lobe of the thyroid gland measures 4.6 x 1.7 x 1.4 cm.  The isthmus measures 0.3 cm in thickness.  There are hypoechoic nodules posterior to the cephalad aspects of the thyroid gland measuring 1.6 x 0.4 cm on the right and 0.9 x 0.6 cm on the left likely representing bilateral parathyroid adenomas.  No cervical adenopathy is identified.       Lab Review        Component Value Date    Vit D, 25-Hydroxy 13 (L) 10/02/2015    PTH, Intact 1,429.0 (H) 09/26/2018    Calcium 8.1 (L) 10/26/2018    Phosphorus 4.6 (H) 10/07/2018    TSH 1.150 09/18/2018    Free T4 1.31 05/16/2018           ASSESSMENT/PLAN:       Assessment    Holly Patel is an 28 y.o. female who presents with secondary hyperparathyroidism, likely due to parathyroid hyperplasia. The above testing and examination support the diagnosis. There is a question as to whether the patient will be able to be listed for a transplant(kidney).   She is currently not on maximal medical treatment currently.      Plan    1. Imaging: will  consider a Sestamebi scan if determined that parathyroid surgery is indicated.  2. Medications: patient should continue current medications: Cinacalcet 3 times a week.  3. The natural history and treatment options for hyperparathyroidism were discussed with the patient. Parathyroidectomy is the only curative treatment for primary hyperparathyroidism. Parathyroidectomy, with standard four-gland exploration, and its risks were discussed. I was also able to discuss the expected no-operative course and the risks of surgery including failure to localize the parathyroid gland, persistent or recurrent hyperparathyroidism with the possibility of the need for a second surgery,  "temporary or permanent hypoparathyroidism resulting in low blood calcium levels that require extensive medication to avoid serious degenerative conditions such as cataracts, brittle bones, muscle weakness and muscle irritability. Other risks include bleeding, infection, injury to the recurrent laryngeal nerves resulting in hoarseness or impairment of speech and the risks of general anesthetic including MI, CVA, sudden death or even reaction to anesthetic medications.The role of intraoperative PTH monitoring was also discussed. The patient understands the risks, any and all questions were answered to the patients satisfaction. The patient is amenable to surgery.   4. Will ensure that patient is medically optimized prior to procedure. In addition, the patient was given our "Understanding Parathyroid Disease" booklet and directed to the [employer] Patient Education portal as a resource.   "

## 2019-03-08 NOTE — DISCHARGE INSTRUCTIONS
Your surgery has been scheduled for:__________________________________________    You should report to:  ____[facility], located on the [place] side of the first floor of the           [facility] ([phone])  ____[facility], located on the [facility] side of the            Second floor of the [facility] ([phone])  ____3rd Floor [facility] located on the [facility] side of the [facility] [phone]  Please Note   - Tell your doctor if you take Aspirin, products containing Aspirin, herbal medications  or blood thinners, such as Coumadin, Ticlid, or Plavix.  (Consult your provider regarding holding or stopping before surgery).  - Arrange for someone to drive you home following surgery.  You will not be allowed to leave the surgical facility alone or drive yourself home following sedation and anesthesia.  Before Surgery  - Stop taking all herbal medications 14days prior to surgery  - No Motrin/Advil (Ibuprofen) 7 days before surgery  - No Aleve (Naproxen) 7 days before surgery  - Stop Taking Asprin, products containing Asprin _____days before surgery  - Stop taking blood thinners_______days before surgery  - No Goody's/BC  Powder 7 days before surgery  - Refrain from drinking alcoholic beverages for 24hours before and after surgery  - Stop or limit smoking _________days before surgery  - You may take Tylenol for pain    Night before Surgery  NOTHING TO EAT OR DRINK AFTER MIDNIGHT OR FOLLOW SURGEON'S INSTRUCTIONS  - Take a shower or bath (shower is recommended).  Bathe with Hibiclens soap or an antibacterial soap from the neck down.  If not supplied by your surgeon, hibiclens soap will need to be purchased over the counter in pharmacy.  Rinse soap off thoroughly.  - Shampoo your hair with your regular shampoo  The Day of Surgery  ·  If you are told to take medication on the morning of surgery, it may be taken with  a sip of water.   - Take another bath or shower with hibiclens or any antibacterial soap, to reduce the chance of infection.  - Take heart and blood pressure medications with a small sip of water, as advised by the perioperative team.  - Do not take fluid pills  - You may brush your teeth and rinse your mouth, but do not swall any additional water.   - Do not apply perfumes, powder, body lotions or deodorant on the day of surgery.  - Nail polish should be removed.  - Do not wear makeup or moisturizer  - Wear comfortable clothes, such as a button front shirt and loose fitting pants.  - Leave all jewelry, including body piercings, and valuables at home.    - Bring any devices you will neeed after surgery such as crutches or canes.  - If you have sleep apnea, please bring your CPAP machine  In the event that your physical condition changes including the onset of a cold or respiratory illness, or if you have to delay or cancel your surgery, please notify your surgeon.      Anesthesia: General Anesthesia  Youre due to have surgery. During surgery, youll be given medication called anesthesia. (It is also called anesthetic.) This will keep you comfortable and pain-free. Your anesthesia provider will use general anesthesia. This sheet tells you more about it.  What is general anesthesia?     You are watched continuously during your procedure by the anesthesia provider   General anesthesia puts you into a state like deep sleep. It goes into the bloodstream (IV anesthetics), into the lungs (gas anesthetics), or both. You feel nothing during the procedure. You will not remember it. During the procedure, the anesthesia provider monitors you continuously. He or she checks your heart rate and rhythm, blood pressure, breathing, and blood oxygen.  · IV Anesthetics. IV anesthetics are given through an IV line in your arm. Theyre often given first. This is so you are asleep before a gas anesthetic is started. Some kinds of IV  anesthetics relieve pain. Others relax you. Your doctor will decide which kind is best in your case.  · Gas Anesthetics. Gas anesthetics are breathed into the lungs. They are often used to keep you asleep. They can be given through a facemask or a tube placed in your larynx or trachea (breathing tube).  ? If you have a facemask, your anesthesia provider will most likely place it over your nose and mouth while youre still awake. Youll breathe oxygen through the mask as your IV anesthetic is started. Gas anesthetic may be added through the mask.  ? If you have a tube in the larynx or trachea, it will be inserted into your throat after youre asleep.  Anesthesia tools and medications  You will likely have:  · IV anesthetics. These are put into an IV line into your bloodstream.  · Gas anesthetics. You breathe these anesthetics into your lungs, where they pass into your bloodstream.  · Pulse oximeter. This is a small clip that is attached to the end of your finger. This measures your blood oxygen level.  · Electrocardiography leads (electrodes). These are small sticky pads that are placed on your chest. They record your heart rate and rhythm.  · Blood pressure cuff. This reads your blood pressure.  Risks and possible complications  General anesthesia has some risks. These include:  · Breathing problems  · Nausea and vomiting  · Sore throat or hoarseness (usually temporary)  · Allergic reaction to the anesthetic  · Irregular heartbeat (rare)  · Cardiac arrest (rare)   Anesthesia safety  · Follow all instructions you are given for how long not to eat or drink before your procedure.  · Be sure your doctor knows what medications and drugs you take. This includes over-the-counter medications, herbs, supplements, alcohol or other drugs. You will be asked when those were last taken.  · Have an adult family member or friend drive you home after the procedure.  · For the first 24 hours after your surgery:  ? Do not drive or use  heavy equipment.  ? Have a trusted family member or spouse make important decisions or sign documents.  ? Avoid alcohol.  ? Have a responsible adult stay with you. He or she can watch for problems and help keep you safe.  Date Last Reviewed: 10/16/2014  © 6643-2332 [employer]. [address] [place] [address]. All rights reserved. This information is not intended as a substitute for professional medical care. Always follow your healthcare professional's instructions.

## 2019-03-08 NOTE — TELEPHONE ENCOUNTER
Called pt to advise the pre-admit team is able to see her today at 2p. Also informed her of everything that has been ordered, or needed for her pre-op work-up regarding labs, meds,  Clearances, dialysis notification.

## 2019-03-08 NOTE — TELEPHONE ENCOUNTER
----- Message from Connie Ann RN sent at 3/8/2019 11:58 AM CST -----  Regarding: Transplant Clearance   Hi Dr. Sosa,     The above pt is scheduled to have a Parathyroidectomy on Wed, March 27th with Dr. Winter. She has requested a clearance from the transplant team.     Thank you,     Connie Ann, MN, LNC, RN  [phone]

## 2019-03-08 NOTE — TELEPHONE ENCOUNTER
----- Message from Connie Ann RN sent at 3/8/2019 10:12 AM CST -----  Regarding: Pre-Admit  The above pt needs a pre-admit appt.     Do you all have any times available today before 1p or after 3p? She'll be here for other appts.

## 2019-03-08 NOTE — ANESTHESIA PREPROCEDURE EVALUATION
03/08/2019  Holly Patel is a 28 y.o., female.    Anesthesia Evaluation    I have reviewed the Patient Summary Reports.    I have reviewed the Nursing Notes.   I have reviewed the Medications.     Review of Systems  Anesthesia Hx:  History of prior surgery of interest to airway management or planning: Previous anesthesia: General EUA 8/8/18 with general anesthesia.  Procedure performed at an [facility]. Denies Family Hx of Anesthesia complications.   Denies Personal Hx of Anesthesia complications.   Social:  Non-Smoker, No Alcohol Use    Hematology/Oncology:     Oncology Normal    -- Anemia (H/H 9.2/28.2 3/25/19): Hematology Comments: Thrombocytopenia (platelet ct 58k) 3/25/19    EENT/Dental:   Hypertensive retinopathy   Cardiovascular:   Hypertension (BP in /118; saw cardiology, Dr Busby for BP 2/25/19 with some medication changes), poorly controlled Denies MI.        Functional Capacity good / => 4 METS, climb 1 FOS, walk in mall, bowling; denies CP, reports SOB which is not new    Pulmonary:   Shortness of breath (with exertion, not new) Denies Recent URI.  Denies Sleep Apnea.    Renal/:   Chronic Renal Disease, ESRD, Dialysis AV fistula LUE; dialysis T[facility]Th-Sa at [facility]; nephrologist is Dr Bass; states on HD x 5 yrs and does not make urine   Hepatic/GI:   GERD Liver Disease, (s/p liver transplant 1992 (hemangioendothelioma) Chronic rejection of liver   OB/GYN/PEDS:  No longer has cycle   Neurological:   Headaches Seizures (last 11/15/18; takes Vimpat, Keppra)  Denies Pain    Endocrine:   Denies Diabetes.  Parathyroid Disease, Hyperparathyroidism    Psych:   depression          Physical Exam  General:  Well nourished    Airway/Jaw/Neck:  Airway Findings: Mouth Opening: Normal Tongue: Normal  General Airway Assessment: Adult  Jaw/Neck Findings:     Neck ROM: Normal  ROM      Dental:  Dental Findings: In tact   Chest/Lungs:  Chest/Lungs Findings: Clear to auscultation, Normal Respiratory Rate     Heart/Vascular:  Heart Findings: Rate: Tachycardia  Rhythm: Regular Rhythm  Sounds: Normal        Mental Status:  Mental Status Findings:  Cooperative, Alert and Oriented       LIMB ALERT LUE: NO BP, IV OR LAB DRAW     Pt was seen in POC 3/8/19; surgeon's office to obtain hepatology and nephrology clearances/Mag Moreno RN    Addendum 3/26/19 1321: per PCP, Dr Sosa: preop -- pt is at low to moderate risk for surgery, ok to proceed for this moderately risky procedure.  Pt evaluated in liver transplant clinic by Dr Pinedo on 3/25/19 (see note).  Pt admitted to hospital 3/12-3/13/19 for hypertensive urgency, gastroenteritis (see note)  Nephrology note 3/14/19/Mag Moreno RN        Anesthesia Plan  Type of Anesthesia, risks & benefits discussed:  Anesthesia Type:  general  Patient's Preference:   Intra-op Monitoring Plan: standard ASA monitors  Intra-op Monitoring Plan Comments:   Post Op Pain Control Plan: multimodal analgesia  Post Op Pain Control Plan Comments:   Induction:   IV  Beta Blocker:  Patient is not currently on a Beta-Blocker (No further documentation required).       Informed Consent: Patient understands risks and agrees with Anesthesia plan.  Questions answered. Anesthesia consent signed with patient.  ASA Score: 4     Day of Surgery Review of History & Physical:    H&P update referred to the surgeon.         Ready For Surgery From Anesthesia Perspective.

## 2019-03-12 PROBLEM — G93.9 BRAIN LESION: Status: ACTIVE | Noted: 2019-01-01

## 2019-03-12 PROBLEM — R11.2 NON-INTRACTABLE VOMITING WITH NAUSEA: Status: ACTIVE | Noted: 2019-01-01

## 2019-03-12 NOTE — ED NOTES
Transporting patient from CT scan.  Noted patient HR was 115 patient denies that she is SOB and states that she is feeling that she is going to have a seizure.  Patient begin jerking her entire body, noted no decrease in SpO2 noted to stay above 98% and noted HR was 130.  Patient was transported immediately to room 10. Patient awakened with no residues.  IGLESIA Sosa at the bedside along with family.

## 2019-03-12 NOTE — ASSESSMENT & PLAN NOTE
-Will hold HD until evaluation by Neurosurgery  -Hep B panel, daily CBC, renal function panel  -Renal diet  -1 liter fluid restrictions per day  -Daily standing weights and chart  -Strict I/O and chart  -Medications to renal parameter    Dialysis Information: iHD  -Out patient HD unit: [facility]  -Nephrologist: Dr. Bass  -HD tx days: TTS  -HD tx time: 3.5 hours  -HD access: AVF  -Last HD: Sat, Mar 9th  -EDW: 50kg  -RRF: anuric    Anemia  -Hb > 7 gm/dL  -hold epogen    HTN  - Maintain MAP >65  - Keep target Bp <140/90

## 2019-03-12 NOTE — ED NOTES
Telemetry Verification   Patient placed on Telemetry Box  Verified on ED monitor  Box 0572   Monitor Tech Katie   Rate 95   Rhythm Normal Sinus

## 2019-03-12 NOTE — MEDICAL/APP STUDENT
Hospital Medicine  History and Physical Exam    [place] [facility] [place] Powers MD  Admit Date: 3/12/2019  [facility]   Principal Problem:  <principal problem not specified>   Patient information was obtained from patient and ER records.   Primary care Physician: Stan Sosa MD  Code status: Full Code    HPI:   29 yo female with a history of ESRD (HD TThSat), liver transplantation (1992), anemia from CKD, and poorly controlled hypertension who presents with abdominal pain. Associated with nausea, near constant, non-bloody vomiting, as well as SOB and a dull, right sided headache. She also had diarrhea.  Started Monday 3/11. Unable to tolerate PO intake. Has blurry vision, but only on standing. No chest pain or lower extremity swelling. Referred to the ED from dialysis where she noted to have an elevated BP. Did not receive HD. History of poor compliance with liver transplant immunosuppression and antihypertensives with admissions for hypertensive emergency complicated by hypertensive retinopathy.  She states she has been taking her meds.  The patient felt better after receiving metoclopramide, hydralazine and carvedilol in ED.  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  She denies eating raw seafood or having sick contacts.  No one else at home has current symptoms.  She did have a flu shot.  No fever.  The patient had a spell in ED which was not though to be a seizure.  She states has has been compliant with her seizure meds.       Past Medical History: Patient has a past medical history of Anemia in ESRD (end-stage renal disease) (10/12/2015), Chronic rejection of liver transplant (3/22/2016), Depression, Encounter for blood transfusion, ESRD on hemodialysis (9/30/2015), History of recent hospitalization (05/2018), History of splenomegaly (4/12/2016), Immunosuppressed (8/5/2017), Iron deficiency anemia secondary to inadequate dietary iron intake (8/16/2017),  Liver replaced by transplant (9/10/2012), Moderate protein-calorie malnutrition (2017), MRSA bacteremia (2017), Pneumonia, Prophylactic immunotherapy (2014), Renovascular hypertension (10/2/2015), Secondary hyperparathyroidism (2017), Seizures, Sialadenitis (3/21/2018), and Thrombocytopenia (2016).    Past Surgical History: Patient has a past surgical history that includes Liver transplant (1992); Liver biopsy;  section; Tubal ligation (); Conization of cervix using loop electrosurgical excision procedure (LEEP) (N/A, 6/15/2018); Examination under anesthesia (N/A, 2018); Perineorrhaphy (2018); Examination under anesthesia (N/A, 2018); Embolization (N/A, 2018); Examination under anesthesia (N/A, 2018); Examination under anesthesia (N/A, 2018); and Transjugular biopsy of liver (N/A, 2018).    Social History: Patient reports that  has never smoked. she has never used smokeless tobacco. She reports that she does not drink alcohol or use drugs.    Family History: family history includes Cancer in her sister; Heart attack in her maternal uncle; Hypertension in her father and mother.    Medications:   Prior to Admission medications    Medication Sig Start Date End Date Taking? Authorizing Provider   carvedilol (COREG) 25 MG tablet Take 1 tablet (25 mg total) by mouth 2 (two) times daily. 19  Yes Sandra Magallon MD   hydrALAZINE (APRESOLINE) 100 MG tablet Take 1 tablet (100 mg total) by mouth every 8 (eight) hours. 19 Yes Sandra Magallon MD   irbesartan (AVAPRO) 300 MG tablet Take 1 tablet (300 mg total) by mouth once daily.  Patient taking differently: Take 300 mg by mouth every morning.  12/5/18 3/12/19 Yes Lei Pinedo MD   lacosamide (VIMPAT) 50 mg Tab Take 1 tablet (50 mg total) by mouth 2 (two) times daily. 10/24/18 10/24/19 Yes Michael Mendoza MD   levETIRAcetam (KEPPRA) 500 MG Tab Take 1 tablet (500 mg total) by  mouth 2 (two) times daily. 10/24/18 10/24/19 Yes Michael Mendoza MD   sevelamer carbonate (RENVELA) 800 mg Tab Take 1 tablet (800 mg total) by mouth 3 (three) times daily with meals. 1/22/19 1/22/20 Yes SAM Haskins   verapamil (CALAN-SR) 240 MG CR tablet Take 1 tablet (240 mg total) by mouth every evening. 2/25/19 2/25/20 Yes Sandra Magallon MD   aspirin 81 MG Chew Take 1 tablet (81 mg total) by mouth once daily. 2/21/18 2/21/19  Farheen Tellez MD   cinacalcet (SENSIPAR) 30 MG Tab Take 1 tablet (30 mg total) by mouth daily with breakfast.  Patient taking differently: Take 30 mg by mouth daily with breakfast. On Dialysis days 9/20/18 9/20/19  Kennedy Loco MD   famotidine (PEPCID) 40 MG tablet Take 1 tablet (40 mg total) by mouth once daily.  Patient taking differently: Take 40 mg by mouth every morning.  1/14/19   Stan Sosa MD   tacrolimus (PROGRAF) 1 MG Cap Take 6 capsules (6 mg total) by mouth every 12 (twelve) hours. 11/28/18 11/28/19  Lei Pinedo MD   triamcinolone acetonide 0.1% (KENALOG) 0.1 % ointment AAA on arms, legs, and neck bid x 1-2 wks then prn flares only 12/31/14   Diana Grider MD       Allergies: Patient is allergic to chloral hydrate and hydrocodone.    Review of Systems   Constitutional: Negative for chills, fever and malaise/fatigue.   HENT: Negative for congestion and sore throat.    Respiratory: Positive for cough (non-productive). Negative for shortness of breath.    Cardiovascular: Negative for chest pain and palpitations.   Gastrointestinal: Negative for abdominal pain, nausea and vomiting.   Genitourinary: Negative for flank pain.        Anuric chronically   Musculoskeletal: Negative for falls and myalgias.   Skin: Negative for itching and rash.   Neurological: Positive for headaches. Negative for focal weakness, seizures and weakness.   Psychiatric/Behavioral: Negative for depression. The patient is not nervous/anxious.        PEx  Temp:   [97.6 °F (36.4 °C)-98.3 °F (36.8 °C)]   Pulse:  []   Resp:  [9-29]   BP: (161-232)/()   SpO2:  [97 %-100 %]   Body mass index is 19.64 kg/m².     Physical Exam   Constitutional: She is oriented to person, place, and time and well-developed, well-nourished, and in no distress. No distress.   HENT:   Head: Normocephalic and atraumatic.   Eyes: Conjunctivae and EOM are normal. No scleral icterus.   Neck: Neck supple.   Cardiovascular: Normal rate and regular rhythm.   Thrill to left wrist due to HD access.   Pulmonary/Chest: Effort normal and breath sounds normal. No respiratory distress. She has no wheezes. She has no rales.   Abdominal: Bowel sounds are normal. She exhibits distension. There is no tenderness. There is no rebound and no guarding.   Musculoskeletal: She exhibits no edema.   Neurological: She is alert and oriented to person, place, and time.   Skin: Skin is warm and dry.       No intake or output data in the 24 hours ending 03/12/19 1329  Recent Labs   Lab 03/08/19  1340 03/12/19  0810 03/12/19  0821   WBC 2.92* 3.70*  --    HGB 9.6* 9.7*  --    HCT 29.6* 29.7* 28*   PLT 69* 54*  --      Recent Labs   Lab 03/08/19  1340 03/12/19  0810    138   K 4.1 4.7    102   CO2 28 21*   BUN 45* 73*   CREATININE 8.0* 10.6*   GLU 97 96   CALCIUM 8.4* 9.0   MG  --  2.3   PHOS  --  5.2*   LIPASE  --  31     Recent Labs   Lab 03/08/19  1340 03/12/19  0810   ALKPHOS 833* 861*   ALT 50* 47*   AST 48* 34   ALBUMIN 3.0* 3.0*   PROT 7.9 7.5   BILITOT 0.9 1.0      No results for input(s): POCTGLUCOSE in the last 168 hours.  Recent Labs     03/12/19  0810   TROPONINI 0.061*       Recent Labs     03/12/19  0810   LACTATE 1.1        Active Hospital Problems    Diagnosis  POA    Non-intractable vomiting with nausea [R11.2]  Yes    Brain lesion [G93.9]  Yes     27 yo female with PMHx hyperparathyroidism, ESRD, HTN, epilepsy, liver transplant in 1991 who presents with complaints of headache, n/v, abdominal  pain x1 day while at dialysis today.     - Neurologically stable   - No acute neurosurgical intervention necessary at this time.   - CT head with left temporal calvarium lesion with surrounding slight mass effect and 2mm midline shift. New lesion in comparison to prior CT head 10/2018. No evidence for acute hemorrhage.   - Remainder of care per primary.  - Recommend consult to Epilepsy if suspect seizure activity.   - Follow-up in NSGY clinic with Dr. Powell. Patient will be called with appointment.   - Discussed with Dr. Powell.             Seizure-like activity [R56.9]  Yes    Renovascular hypertension [I15.0]  Yes     Chronic    ESRD on hemodialysis [N18.6, Z99.2]  Not Applicable     Chronic    Hypertensive urgency [I16.0]  Yes   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 [Z94.4]  Not Applicable     Chronic     hemangioendothelioma s/p LTx (1992)        Resolved Hospital Problems   No resolved problems to display.       Overview  29 yo AA female with a history of ESRD (HD MWF), liver transplantation (1992), anemia from CKD, and poorly controlled hypertension who is admitted for hypertensive emergency.      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 Bass, pt is anuric  · Home meds include sensipar 30 mg QD,  scheduled for a parathyroidectomy on 3/27 (Dr. 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, without concerns for hydrocephalus and with no neurosurgical intervention recommended currently; she will f/u in clinic with NS    GERD  · Home meds include famotidine    History of medication non-compliance  · Poor compliance with transplant immunosuppression and antihypertensives  · Verify dose of prograf as conflict with hepatology note and med list    History of seizures  · Episode in ED witnessed and not though to be seizure  · Last 11/2018, now beyond the 1 year for which ppx required  · Home meds include vimpat and keppra    Diet:  Diet clear liquid and advance as tolerated  GI PPx:  famotidine  DVT PPx:   VTE Risk Mitigation (From admission, onward)        Ordered     Place LINDA hose  Until discontinued      03/13/19 0010     IP VTE HIGH RISK PATIENT  Once      03/13/19 0010     heparin (porcine) injection 5,000 Units  Every 8 hours      03/13/19 0010        Goals of care: tolerating po solid diet  Lines/Drains/Airways: PIV  Wounds: none  Discharge plan and follow up:   Neurosurgery (brown tumor, follow with Dr. Powell)    Provider   Emily Powers MD  Staff Hospitalist   [facility] [id number]    I personally scribed for Emily Powers MD on 03/12/2019 at 1:29 PM. Electronically signed by chang Fuller on 03/12/2019 at 1:29 PM

## 2019-03-12 NOTE — NURSING
Pt arrived to unit via stretcher from ED.  Pt aaox3, vss, no s/s of distress noted.  Pt denies pain at this time.  Bed locked and in lowest position.  Call light within reach.

## 2019-03-12 NOTE — CONSULTS
[facility]  Neurosurgery  Consult Note    Consults  Subjective:     Chief Complaint/Reason for Admission: hypertensive emergency    History of Present Illness: 27 yo female with PMHx hyperparathyroidism, ESRD, HTN, epilepsy, liver transplant in 1991 who presents with complaints of headache, n/v, abdominal pain x1 day while waiting to receive dialysis today. Hypertensive at dialysis, sent to ED for hypertensive emergency. Reports associated double vision when up. Headache now resolved after 1 dose of reglan given in ED. CT scan this morning, after CT patient stated she felt as though she was going to have a seizure. Mom at bedside noted jerking activity, nursing unsure of seizure activity. Patient conscious during activity. No post-ictal state after seizure. Patient takes Keppra 500mg BID and Vimpat 50mg BID at home for hx of epilepsy, has not had a seizure in years. Hyperparathyroidectomy scheduled for 3/27. Will receive dialysis today. NSGY consulted for CTH with left calvarium lesion with associated mass effect and 2mm MLS. Denies headache, weakness, numbness, paresthesias, b/b dysfunction, gait difficulty, f/c. Mom reports recent significant weight loss, patient states she has not had an appetite and sometimes goes 2-3 days without eating. No family hx of cancer. Takes ASA 81 mg daily.         Medications Prior to Admission   Medication Sig Dispense Refill Last Dose    carvedilol (COREG) 25 MG tablet Take 1 tablet (25 mg total) by mouth 2 (two) times daily. 120 tablet 3 3/12/2019    hydrALAZINE (APRESOLINE) 100 MG tablet Take 1 tablet (100 mg total) by mouth every 8 (eight) hours. 180 tablet 3 3/12/2019    irbesartan (AVAPRO) 300 MG tablet Take 1 tablet (300 mg total) by mouth once daily. (Patient taking differently: Take 300 mg by mouth every morning. ) 30 tablet 3 3/12/2019    lacosamide (VIMPAT) 50 mg Tab Take 1 tablet (50 mg total) by mouth 2 (two) times daily. 60 tablet 11 3/11/2019     levETIRAcetam (KEPPRA) 500 MG Tab Take 1 tablet (500 mg total) by mouth 2 (two) times daily. 60 tablet 11 3/11/2019    sevelamer carbonate (RENVELA) 800 mg Tab Take 1 tablet (800 mg total) by mouth 3 (three) times daily with meals. 90 tablet 11 3/11/2019    verapamil (CALAN-SR) 240 MG CR tablet Take 1 tablet (240 mg total) by mouth every evening. 90 tablet 3 3/12/2019    aspirin 81 MG Chew Take 1 tablet (81 mg total) by mouth once daily.  0 Taking    cinacalcet (SENSIPAR) 30 MG Tab Take 1 tablet (30 mg total) by mouth daily with breakfast. (Patient taking differently: Take 30 mg by mouth daily with breakfast. On Dialysis days) 30 tablet 2 Taking    famotidine (PEPCID) 40 MG tablet Take 1 tablet (40 mg total) by mouth once daily. (Patient taking differently: Take 40 mg by mouth every morning. ) 30 tablet 11 Taking    tacrolimus (PROGRAF) 1 MG Cap Take 6 capsules (6 mg total) by mouth every 12 (twelve) hours. 360 capsule 11 Taking    triamcinolone acetonide 0.1% (KENALOG) 0.1 % ointment AAA on arms, legs, and neck bid x 1-2 wks then prn flares only 80 g 3 Taking       Review of patient's allergies indicates:   Allergen Reactions    Chloral hydrate Hallucinations     Other reaction(s): Hallucinations  Other reaction(s): Hives    Hydrocodone Other (See Comments)     Mental status changes       Past Medical History:   Diagnosis Date    Anemia in ESRD (end-stage renal disease) 10/12/2015    dialysis tues, thursday, sat; access left arm    Chronic rejection of liver transplant 3/22/2016    Depression     Encounter for blood transfusion     ESRD on hemodialysis 9/30/2015    History of recent hospitalization 05/2018    pneumonia    History of splenomegaly 4/12/2016    Immunosuppressed 8/5/2017    Iron deficiency anemia secondary to inadequate dietary iron intake 8/16/2017    She receives IV iron periodically at the D[facility] Center.   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    hemangioendothelioma s/p  LTx ()    Moderate protein-calorie malnutrition 2017    MRSA bacteremia 2017    Pneumonia     Prophylactic immunotherapy 2014    Renovascular hypertension 10/2/2015    Secondary hyperparathyroidism 2017    Seizures     Sialadenitis 3/21/2018    Thrombocytopenia 2016     Past Surgical History:   Procedure Laterality Date    BIOPSY, LIVER, TRANSJUGULAR APPROACH N/A 2018    Performed by [facility] OR 2ND FLR    BIOPSY-LIVER N/A 2017    Performed by [facility] OR 2ND FLR    BIOPSY-LIVER N/A 3/22/2016    Performed by [facility] OR 2ND FLR     SECTION      x 2    CONIZATION-CERVICAL-LEEP N/A 6/15/2018    Per[facility] at [facility] OR    ARGEBRDMNRHB-CNUELIC-RC; upper extremity Left 2015    Performed by Idalia Diaz [facility] at [facility] OR 2ND FLR    EMBOLIZATION, BLOOD VESSEL N/A 2018    Performed by [facility] at [facility] CATH LAB    Exam Under Anesthesia N/A 2018    Performed by [facility] at [facility] OR 2ND FLR    Exam under anesthesia N/A 2018    Per[facility] OR    Exam under anesthesia (ADD ON ) N/A 2018    Performed by NICKIE Alvarez [facility] at [facility] OR    Exam under anesthesia -cervical suturing  N/A 2018    Performed by Lei Sims III, [facility] at [facility] OR    FISTULOGRAM Left 2015    Performed by [facility] at [facility] CATH LAB    LIVER BIOPSY      LIVER TRANSPLANT  1992    SUTURE REPAIR,CERVIX  2018    Per[facility] at [facility] OR    TUBAL LIGATION       Family History     Problem Relation (Age of Onset)    Cancer Sister    Heart attack Maternal Uncle    Hypertension Mother, Father        Tobacco Use    Smoking status: Never Smoker    Smokeless tobacco: Never Used   Substance and Sexual Activity    Alcohol use: No    Drug use: No    Sexual activity:  No     Partners: Male     Review of Systems   Constitutional: no fever, chills or night sweats. + changes in weight   Eyes: no visual changes   ENT: no nasal congestion or sore throat   Respiratory: no cough. +shortness of breath   Cardiovascular: no chest pain or palpitations   Gastrointestinal: + n/v   Genitourinary: no hematuria or dysuria   Integument/Breast: no rash or pruritis   Hematologic/Lymphatic: no easy bruising or lymphadenopathy   Musculoskeletal: no arthralgias or myalgias.   Neurological: + seizure   Behavioral/Psych: no auditory or visual hallucinations   Endocrine: no heat or cold intolerance        Objective:     Weight: 53.5 kg (118 lb)  Body mass index is 19.64 kg/m².  Vital Signs (Most Recent):  Temp: 97.4 °F (36.3 °C) (03/12/19 1406)  Pulse: 105 (03/12/19 1532)  Resp: 17 (03/12/19 1406)  BP: (!) 141/82 (03/12/19 1406)  SpO2: 97 % (03/12/19 1406) Vital Signs (24h Range):  Temp:  [97.4 °F (36.3 °C)-98.3 °F (36.8 °C)] 97.4 °F (36.3 °C)  Pulse:  [] 105  Resp:  [9-29] 17  SpO2:  [97 %-100 %] 97 %  BP: (141-232)/() 141/82                           Hemodialysis AV Fistula Left forearm (Active)   Needle Size Buttonhole 10/6/2018  2:41 PM   Site Assessment Clean;Dry;Intact 11/16/2018 11:24 AM   Patency Present;Thrill;Bruit 11/16/2018 11:24 AM   Status Deaccessed 11/16/2018 11:24 AM   Flows Good 10/6/2018  2:41 PM   Dressing Intervention New dressing 10/6/2018  5:57 PM   Dressing Status Clean;Dry;Intact 10/7/2018  8:00 AM   Site Condition No complications 10/6/2018  5:57 PM   Dressing Pressure dressing 10/6/2018  5:57 PM   Drainage Description Other (Comment) 4/14/2018  5:26 PM       Neurosurgery Physical Exam    General: well developed, well nourished, no distress.   Head: normocephalic, atraumatic  Neurologic: Alert and oriented. Thought content appropriate.  GCS: Motor: 6/Verbal: 5/Eyes: 4 GCS Total: 15  Mental Status: Awake, Alert, Oriented x 4  Language: No aphasia  Speech: No  dysarthria  Cranial nerves: face symmetric, tongue midline, CN II-XII grossly intact.   Eyes: pupils equal, round, reactive to light with accomodation, EOMI.   Pulmonary: normal respirations, no signs of respiratory distress  Abdomen: soft, distended, non-tender to light palpation  Sensory: intact to light touch throughout  Motor Strength: Moves all extremities spontaneously with good tone.  Full strength upper and lower extremities. No abnormal movements seen.     Strength  Deltoids Triceps Biceps Wrist Extension Wrist Flexion Hand    Upper: R 5/5 5/5 5/5 5/5 5/5 5/5    L 5/5 5/5 5/5 5/5 5/5 5/5     Iliopsoas Quadriceps Knee  Flexion Tibialis  anterior Gastro- cnemius EHL   Lower: R 5/5 5/5 5/5 5/5 5/5 5/5    L 5/5 5/5 5/5 5/5 5/5 5/5     Pronator Drift: no drift noted  Finger-to-nose: Intact bilaterally  Pino: absent  Clonus: absent  Babinski: absent  Pulses: 2+ and symmetric radial and dorsalis pedis.  Skin: Skin is warm, dry and intact.    Significant Labs:  Recent Labs   Lab 03/12/19  0810   GLU 96      K 4.7      CO2 21*   BUN 73*   CREATININE 10.6*   CALCIUM 9.0   MG 2.3     Recent Labs   Lab 03/12/19  0810 03/12/19  0821   WBC 3.70*  --    HGB 9.7*  --    HCT 29.7* 28*   PLT 54*  --      No results for input(s): LABPT, INR, APTT in the last 48 hours.  Microbiology Results (last 7 days)     ** No results found for the last 168 hours. **        Recent Lab Results       03/12/19  0821   03/12/19  0810        Immature Granulocytes   0.3     Immature Grans (Abs)   0.01  Comment:  Mild elevation in immature granulocytes is non specific and   can be seen in a variety of conditions including stress response,   acute inflammation, trauma and pregnancy. Correlation with other   laboratory and clinical findings is essential.       Albumin   3.0     Alkaline Phosphatase   861     ALT   47     Anion Gap   15     AST   34     Baso #   0.02     Basophil%   0.5     Total Bilirubin   1.0  Comment:  For  infants and newborns, interpretation of results should be based  on gestational age, weight and in agreement with clinical  observations.  Premature Infant recommended reference ranges:  Up to 24 hours.............<8.0 mg/dL  Up to 48 hours............<12.0 mg/dL  3-5 days..................<15.0 mg/dL  6-29 days.................<15.0 mg/dL       BUN, Bld   73     Calcium   9.0     Chloride   102     CO2   21     Creatinine   10.6     Differential Method   Automated     eGFR if African American   5.1     eGFR if non    4.4  Comment:  Calculation used to obtain the estimated glomerular filtration  rate (eGFR) is the CKD-EPI equation.        Eos #   0.2     Eosinophil%   6.5     Glucose   96     Gran # (ANC)   2.8     Gran%   74.3     Hematocrit   29.7     Hemoglobin   9.7     Lactate, Michell   1.1  Comment:  Falsely low lactic acid results can be found in samples   containing >=13.0 mg/dL total bilirubin and/or >=3.5 mg/dL   direct bilirubin.       Lipase   31     Lymph #   0.5     Lymph%   13.5     Magnesium   2.3     MCH   25.3     MCHC   32.7     MCV   78     Mono #   0.2     Mono%   4.9     MPV   SEE COMMENT  Comment:  Result not available.     nRBC   0     Phosphorus   5.2     Platelets   54     POC BUN 59       POC Chloride 106       POC Creatinine 10.2       POC Glucose 95       POC Hematocrit 28       POC Ionized Calcium 1.02       POC Potassium 4.5       POC Sodium 139       POC TCO2 (MEASURED) 22       Potassium   4.7     Total Protein   7.5     RBC   3.83     RDW   19.6     Sample MICHELL       Sodium   138     Troponin I   0.061  Comment:  The reference interval for Troponin I represents the 99th percentile   cutoff   for our facility and is consistent with 3rd generation assay   performance.       WBC   3.70         All pertinent labs from the last 24 hours have been reviewed.    Significant Diagnostics:  CT head: Evolving heterogeneous lucency and sclerosis of the calvarium compatible with  sequela of hyperparathyroidism. 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 Mass effect from the expansile lesion on the left cerebral hemisphere with approximately 2-3 mm of rightward midline shift. Relative stable configuration of ventricles without hydrocephalus. No evidence for acute intracranial hemorrhage or significant new abnormal parenchymal attenuation.    Assessment/Plan:     Brain lesion    27 yo female with PMHx hyperparathyroidism, ESRD, HTN, epilepsy, liver transplant in 1991 who presents with complaints of headache, n/v, abdominal pain x1 day while at dialysis today.     - Neurologically stable   - No acute neurosurgical intervention necessary at this time.   - CT head with left temporal calvarium lesion with surrounding slight mass effect and 2mm midline shift. New lesion in comparison to prior CT head 10/2018. No evidence for acute hemorrhage.   - Remainder of care per primary.  - Recommend consult to Epilepsy if suspect seizure activity.   - Follow-up in NSGY clinic with Dr. Powell. Patient will be called with appointment.   - Please call with questions, concerns, or re-consult if changes in exam.   - Discussed with Dr. Powell.                Thank you for your consult. I will sign off. Please contact us if you have any additional questions.    Lindsey Copeland PA-C  Neurosurgery  [facility]

## 2019-03-12 NOTE — ED NOTES
Holly Patel, a 28 y.o. female presents to the ED via EMS with CC lower abdominal pain with nausea/ vomiting since yesterday.  Patient with complaints of headache.  She was sent from Dialysis for having increased BP. Patient is       Patient identifiers verified verbally with  and correct for Holly Patel.    LOC/ APPEARANCE: The patient is AAOx4. Pt is speaking appropriately, no slurred speech. Pt changed into hospital gown. Continuous cardiac monitor, cont pulse ox, and auto BP cuff applied to patient. Pt is clean and well groomed. No JVD visible. Pt reports pain level of 7 with complaints of pain in her right sided head and lower abdomen. Pt updated on POC. Bed low and locked with side rails up x2, call bell in pt reach.  SKIN: Skin is warm dry and intact, and color is consistent with ethnicity. Capillary refill <3 seconds. No breakdown or brusing visible. Mucus membranes moist, acyanotic.   RESPIRATORY: Airway is open and patent. Respirations-spontaneous, unlabored, regular rate, equal bilaterally on inspiration and expiration. No accessory muscle use noted. Lungs clear to auscultation in all fields bilaterally anterior and posterior.   CARDIAC: Patient has regular heart rate.  No peripheral edema noted, and patient has no c/o chest pain. Peripheral pulses present equal and strong throughout.  ABDOMEN: Soft and non-tender to palpation with no distention noted. Normoactive bowel sounds x4 quadrants. Patient complains of nausea/vomiting since yesterday and diarrhea beginning yesterday morning.     NEUROLOGIC: Eyes open spontaneously and facial expression symmetrical. Pt behavior appropriate to situation, and pt follows commands. Pt reports sensation present in all extremities when touched with a finger, denies any numbness or tingling. PERRLA  MUSCULOSKELETAL: Spontaneous movement noted to all extremities. Hand  equal and leg strength strong +5 bilaterally.   : Dialysis graft to left  upper arm ++ for thrill and bruit. Patient states she does have anuria.

## 2019-03-12 NOTE — ED PROVIDER NOTES
Encounter Date: 3/12/2019       History     Chief Complaint   Patient presents with    Multiple complaints     abdominal pain, headache, nausea, vomiting, while she was at dialysis they would not give treatment     28-year-old female with multiple comorbidities including history of liver transplant, ESRD on HD TThS presents from dialysis clinic for nausea and vomiting since yesterday.  Patient reports nonbloody, nonbilious emesis with near constant vomiting yesterday.  Patient vomited dialysis, therefore was sent to the ED.  She denies any palliating or provoking factors, has not been able to tolerate p.o. She reports associated diarrhea with multiple nonbloody stools and sensation of abdominal bloating.  She is also complaining of shortness of breath and gradual onset of dull right-sided headache. Endorses brief periods of blurred vision after standing.  Denies chest pain, cough, lower extremity swelling or fevers.  Patient is aneuric.          Review of patient's allergies indicates:   Allergen Reactions    Chloral hydrate Hallucinations     Other reaction(s): Hallucinations  Other reaction(s): Hives    Hydrocodone Other (See Comments)     Mental status changes     Past Medical History:   Diagnosis Date    Anemia in ESRD (end-stage renal disease) 10/12/2015    dialysis tues, thursday, sat; access left arm    Chronic rejection of liver transplant 3/22/2016    Depression     Encounter for blood transfusion     ESRD on hemodialysis 9/30/2015    History of recent hospitalization 05/2018    pneumonia    History of splenomegaly 4/12/2016    Immunosuppressed 8/5/2017    Iron deficiency anemia secondary to inadequate dietary iron intake 8/16/2017    She [facility].   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)    Moderate protein-calorie malnutrition 8/16/2017    MRSA bacteremia 8/6/2017    Pneumonia     Prophylactic  immunotherapy 2014    Renovascular hypertension 10/2/2015    Secondary hyperparathyroidism 2017    Seizures     Sialadenitis 3/21/2018    Thrombocytopenia 2016     Past Surgical History:   Procedure Laterality Date    BIOPSY, LIVER, TRANSJUGULAR APPROACH N/A 2018    Performed by [facility] at [facility] OR 2ND FLR    BIOPSY-LIVER N/A 2017    Performed by [facility] OR 2ND FLR    BIOPSY-LIVER N/A 3/22/2016    Performed by [facility] OR 2ND FLR     SECTION      x 2    CONIZATION-CERVICAL-LEEP N/A 6/15/2018    Performed by [facility] at [facility] OR    [id number]; upper extremity Left 2015    Performed by Id[facility] at [facility] OR 2ND FLR    EMBOLIZATION, BLOOD VESSEL N/A 2018    Performed by [facility]ren Ramos [facility] at [facility] CATH LAB    Exam Under Anesthesia N/A 2018    Performed by [facility] at [facility] OR 2ND FLR    Exam under anesthesia N/A 2018    Performed by [facility] at [facility] OR    Exam under anesthesia (ADD ON ) N/A 2018    Performed by NICKIE Alvarez MD at [facility] OR    Exam under anesthesia -cervical suturing  N/A 2018    Performed by Lei Sims III, MD at [facility] OR    FISTULOGRAM Left 2015    Performed by [facility] at [facility] CATH LAB    LIVER BIOPSY      LIVER TRANSPLANT  1992    SUTURE REPAIR,CERVIX  2018    Performed by [facility] at [facility] OR    TUBAL LIGATION       Family History   Problem Relation Age of Onset    Hypertension Mother     Hypertension Father     Cancer Sister     Heart attack Maternal Uncle     Melanoma Neg Hx     Breast cancer Neg Hx     Colon cancer Neg Hx     Ovarian cancer Neg Hx      Social History     Tobacco Use    Smoking status: Never Smoker    Smokeless tobacco: Never Used   Substance Use Topics    Alcohol use: No    Drug use: No     Review of  Systems   Constitutional: Negative for chills, fatigue and fever.   Eyes: Positive for visual disturbance. Negative for photophobia.   Respiratory: Positive for shortness of breath. Negative for cough.    Cardiovascular: Negative for chest pain, palpitations and leg swelling.   Gastrointestinal: Positive for abdominal distention, abdominal pain, diarrhea, nausea and vomiting. Negative for anal bleeding, blood in stool, constipation and rectal pain.   Musculoskeletal: Negative for back pain and gait problem.   Skin: Negative for pallor and wound.   Allergic/Immunologic: Positive for immunocompromised state. Negative for environmental allergies.   Neurological: Positive for headaches. Negative for dizziness, tremors, seizures, syncope, facial asymmetry, speech difficulty, weakness, light-headedness and numbness.   Psychiatric/Behavioral: Negative for confusion and hallucinations.       Physical Exam     Initial Vitals [03/12/19 0734]   BP Pulse Resp Temp SpO2   (!) 216/142 96 18 98.3 °F (36.8 °C) 100 %      MAP       --         Vitals:    03/12/19 1250   BP: 161/96   Pulse: 99   Resp: 12   Temp: 98.3       Physical Exam    Nursing note and vitals reviewed.  Constitutional: She appears well-developed and well-nourished. She is not diaphoretic. No distress.   HENT:   Head: Normocephalic and atraumatic.   Eyes: Conjunctivae and EOM are normal. Pupils are equal, round, and reactive to light.   Neck: Normal range of motion. Neck supple.   Cardiovascular: Normal rate, regular rhythm, normal heart sounds and intact distal pulses. Exam reveals no gallop and no friction rub.    No murmur heard.  Pulmonary/Chest: Breath sounds normal. No respiratory distress. She has no wheezes. She has no rhonchi. She has no rales. She exhibits no tenderness.   Abdominal: Soft. Bowel sounds are normal. She exhibits distension. She exhibits no mass. There is no tenderness. There is no rebound and no guarding.   Musculoskeletal: Normal range of  motion.   Neurological: She is alert and oriented to person, place, and time. She has normal strength. No cranial nerve deficit or sensory deficit. GCS score is 15. GCS eye subscore is 4. GCS verbal subscore is 5. GCS motor subscore is 6.   Skin: Skin is warm and dry.   Psychiatric: She has a normal mood and affect.         ED Course   Critical Care  Date/Time: 3/12/2019 2:44 PM  Performed by: Sheila Orellana PA-C  Authorized by: Michelle Jameson MD   Direct patient critical care time: 20 minutes  Additional history critical care time: 5 minutes  Ordering / reviewing critical care time: 5 minutes  Documentation critical care time: 5 minutes  Consulting other physicians critical care time: 5 minutes  Consult with family critical care time: 0 minutes  Other critical care time: 0 minutes  Total critical care time (exclusive of procedural time) : 40 minutes  Critical care time was exclusive of separately billable procedures and treating other patients and teaching time.  Critical care was necessary to treat or prevent imminent or life-threatening deterioration of the following conditions: circulatory failure.  Critical care was time spent personally by me on the following activities: development of treatment plan with patient or surrogate, evaluation of patient's response to treatment, examination of patient, obtaining history from patient or surrogate, ordering and performing treatments and interventions, ordering and review of laboratory studies, ordering and review of radiographic studies and re-evaluation of patient's condition.        Labs Reviewed   CBC W/ AUTO DIFFERENTIAL - Abnormal; Notable for the following components:       Result Value    WBC 3.70 (*)     RBC 3.83 (*)     Hemoglobin 9.7 (*)     Hematocrit 29.7 (*)     MCV 78 (*)     MCH 25.3 (*)     RDW 19.6 (*)     Platelets 54 (*)     Lymph # 0.5 (*)     Mono # 0.2 (*)     Gran% 74.3 (*)     Lymph% 13.5 (*)     All other components within normal  limits   COMPREHENSIVE METABOLIC PANEL - Abnormal; Notable for the following components:    CO2 21 (*)     BUN, Bld 73 (*)     Creatinine 10.6 (*)     Albumin 3.0 (*)     Alkaline Phosphatase 861 (*)     ALT 47 (*)     eGFR if  5.1 (*)     eGFR if non  4.4 (*)     All other components within normal limits   TROPONIN I - Abnormal; Notable for the following components:    Troponin I 0.061 (*)     All other components within normal limits   PHOSPHORUS - Abnormal; Notable for the following components:    Phosphorus 5.2 (*)     All other components within normal limits    Narrative:     PHOS add on per VALERIE Saleh  10:10  03/12/2019    ISTAT PROCEDURE - Abnormal; Notable for the following components:    POC BUN 59 (*)     POC Creatinine 10.2 (*)     POC TCO2 (MEASURED) 22 (*)     POC Ionized Calcium 1.02 (*)     POC Hematocrit 28 (*)     All other components within normal limits   MAGNESIUM   LACTIC ACID, PLASMA   LIPASE   PHOSPHORUS   HEPATITIS B CORE ANTIBODY, TOTAL   HEPATITIS B SURFACE ANTIBODY   HEPATITIS B SURFACE ANTIGEN   ISTAT CHEM8     EKG Readings: (Independently Interpreted)   Initial Reading: No STEMI. Previous EKG: Compared with most recent EKG Previous EKG Date: 12/12/2018. Rhythm: Normal Sinus Rhythm. Heart Rate: 90. Ectopy: No Ectopy. Clinical Impression: Normal Sinus Rhythm Other Impression: LVH     ECG Results          EKG 12-lead (In process)  Result time 03/12/19 08:42:48    In process by Interface, Lab In [facility] (03/12/19 08:42:48)                 Narrative:    Test Reason : (Not Selected)    Vent. Rate : 090 BPM     Atrial Rate : 090 BPM     P-R Int : 132 ms          QRS Dur : 088 ms      QT Int : 418 ms       P-R-T Axes : 055 050 096 degrees     QTc Int : 511 ms    Normal sinus rhythm  Biatrial enlargement  LVH  T wave abnormality, consider lateral ischemia  Prolonged QT  Abnormal ECG  When compared with ECG of 12-DEC-2018 17:50,  ST no longer depressed  in Lateral leads  T wave inversion no longer evident in Inferior leads  T wave inversion no longer evident in Anterior leads    Referred By: [email or web address]   SELF           Confirmed By:                             Imaging Results          CT Head Without Contrast (Final result)  Result time 03/12/19 09:57:46    Final result by Reggie Espinoza DO (03/12/19 09:57:46)                 Impression:      Evolving heterogeneous lucency and sclerosis of the calvarium compatible with sequela of hyperparathyroidism.    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    Mass effect from the expansile lesion on the left cerebral hemisphere with approximately 2-3 mm of rightward midline shift.    Relative stable configuration of ventricles without hydrocephalus.    No evidence for acute intracranial hemorrhage or significant new abnormal parenchymal attenuation.    Clinical correlation advised      Electronically signed by: Reggie Espinoza DO  Date:    03/12/2019  Time:    09:57             Narrative:    EXAMINATION:  CT HEAD WITHOUT CONTRAST    CLINICAL HISTORY:  Hypertension, headache, blurred vision, concern for PRES;    TECHNIQUE:  Multiple sequential 5 mm axial images of the head without contrast.  Coronal and sagittal reformatted imaging from the axial acquisition.    COMPARISON:  10/04/2018    FINDINGS:  There is a heterogeneous mixed density bony destructive lesion within the left temporal calvarium measuring approximately 2.3 cm AP by 1.3 cm TV and 1.7 cm cc with thinning and expansion of the inner table of the calvarium with lucency and demineralization with possible dehiscence of the outer table.    There is superimposed smaller more subtle lesion in the bilateral inner table of the frontal calvarium as well as the right posterior temporal calvarium.    There is diffuse heterogeneous  attenuation of the calvarium compatible with history of a hyperparathyroidism with focal lesions concerning for brown tumor formation.    There is slight mass effect related to the expansile temporal lesion with approximately 2-3 mm of rightward midline shift without hydrocephalus.  There is no evidence for acute intracranial hemorrhage or significant new abnormal parenchymal attenuation.  There is continued small caliber cerebral sulci at the vertex which may be developmental variant.                               X-Ray Chest PA And Lateral (Final result)  Result time 03/12/19 08:42:53    Final result by Fredy Kelley MD (03/12/19 08:42:53)                 Impression:      Continued demonstration of mild cardiomegaly, but no significant intrathoracic abnormality referable to the current history of dyspnea.  No significant detrimental interval change in the appearance of the chest since 10/04/2018 is appreciated.      Electronically signed by: Fredy Kelley MD  Date:    03/12/2019  Time:    08:42             Narrative:    EXAMINATION:  XR CHEST PA AND LATERAL    TECHNIQUE:  Two views of the chest were obtained, with PA and lateral projections submitted.    COMPARISON:  Comparison is made to 10/04/2018.    FINDINGS:  The heart is mildly enlarged, as before, but the appearance of the cardiomediastinal silhouette is unchanged since the examination referenced above.  Pulmonary vascularity remains normal, and there are no findings indicating current cardiac decompensation.  Lung zones are clear, and free of significant airspace consolidation or volume loss.  No pleural fluid.  No abnormal mediastinal widening.  No pneumothorax.                              X-Rays:   Independently Interpreted Readings:   Chest X-Ray: No acute abnormalities. Cardiomegaly present.     Medical Decision Making:   History:   Old Medical Records: I decided to obtain old medical records.  Old Records Summarized: records from previous  "admission(s).  Clinical Tests:   Lab Tests: Ordered and Reviewed  Radiological Study: Ordered and Reviewed  Medical Tests: Ordered and Reviewed  Other:   I have discussed this case with another health care provider.       <> Summary of the Discussion: Nephrology consulted secondary to need for dialysis.  Neurosurgery consulted due to tumor noted on head CT with small amount of midline shift.  Discussed with hospital medicine       APC / Resident Notes:   28-year-old female presenting for nausea vomiting and diarrhea as well as headache with intermittent blurred vision.  She is hypertensive on ED arrival with BP of 216/142, vitals otherwise normal. Abdomen is soft and nontender with normoactive bowel sounds. DDx includes gastroenteritis, hyperkalemia, electrolyte derangement, PRES, hypertensive emergency.  Will check labs, do chest x-ray, head CT, give hydralazine for blood pressure and Reglan for nausea and reassess.    Upon returning from CT scan, patient had a brief episode where she stated that she "I feel like I am about to catch a seizure" and subsequently had a 1 minute period of shaking.  Per RN, patient did not appear to lose consciousness but rather was just shaking.  I assessed the patient needed thereafter, she was alert and oriented, does not appear drowsy or postictal.  Unclear if the patient had a seizure versus potentially dystonic reaction from Reglan.  Will continue to monitor.  Patient is persistently hypertensive after 1st dose of IV hydralazine, will give 2nd dose of 20 mg.    Labs unrevealing.  Troponin mildly elevated, however this is the patient's baseline.  Phosphorus elevated at 5.2.  Chest x-ray without acute changes.  Head CT shows small lucent lesion of the calvarium concerning for brown tumor formation with 2-3 mm of rightward midline shift.  Will consult Neurosurgery.  Will consult Nephrology given patient is due for dialysis.  Hypertension improving, however patient is tachycardic up " to the 120s.  Will give home Coreg.    Patient to be admitted to Hospital Medicine for further management.  Patient is comfortable with admission.  I discussed this patient with my supervising physician.    Sheila Orellana PA-C           Attending Attestation:     Physician Attestation Statement for NP/PA:   I have conducted a face to face encounter with this patient in addition to the NP/PA, due to Medical Complexity    Other NP/PA Attestation Additions:    History of Present Illness: 29 y/o F ERSD on HD, HTN, prior liver bx presents with nausea and vomiting since yesterday. Has had 1 loose stool. Vomited at HD and was therefore sent to ED. Notes HA- rt sided this morning.   Physical Exam: AAO  NAD  CTA bilat  abd soft non-tender  No sig LE edema   Medical Decision Making: Hypertensive urgency/emergency given markedly elevated BP, electrolyte abnlty, concern for gastro other intra abd infection given immune status. Repeat BP. Will require admission for HD                    Clinical Impression:       ICD-10-CM ICD-9-CM   1. Non-intractable vomiting with nausea, unspecified vomiting type R11.2 787.01   2. ESRD on dialysis N18.6 585.6    Z99.2 V45.11   3. Shortness of breath R06.02 786.05   4. Hypertensive urgency I16.0 401.9   5. Brain lesion G93.9 348.89   6. Seizure-like activity R56.9 780.39         Disposition:   Disposition: Placed in Observation  Condition: Fair         Sheila Orellana PA-C  03/12/19 1755       Michelle Jameson MD  03/14/19 2010       Michelle Jameson MD  03/14/19 2012

## 2019-03-12 NOTE — ED TRIAGE NOTES
Patient sent from Dialysis for having increased vomiting and increased BP at dialysis.  Patient was due to get treatment today was set to ER before beginning.

## 2019-03-12 NOTE — SUBJECTIVE & OBJECTIVE
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 ()    Moderate protein-calorie malnutrition 2017    MRSA bacteremia 2017    Pneumonia     Prophylactic immunotherapy 2014    Renovascular hypertension 10/2/2015    Secondary hyperparathyroidism 2017    Seizures     Sialadenitis 3/21/2018    Thrombocytopenia 2016       Past Surgical History:   Procedure Laterality Date    BIOPSY, LIVER, TRANSJUGULAR APPROACH N/A 2018    Performed by [facility] OR 2ND FLR    BIOPSY-LIVER N/A 2017    Performed by [facility] OR 2ND FLR    BIOPSY-LIVER N/A 3/22/2016    Performed by [facility] OR 2ND FLR     SECTION      x 2    CONIZATION-CERVICAL-LEEP N/A 6/15/2018    Performed by [facility]roquin MD at [facility] OR    [id number]; upper extremity Left 2015    Performed by [facility] at [facility] OR 2ND FLR    EMBOLIZATION, BLOOD VESSEL N/A 2018    Performed by Aren Ramos MD at [facility] CATH LAB    Exam Under Anesthesia N/A 2018    [facility]formed by [facility] OR 2ND FLR    Exam under anesthesia N/A 2018    Performed by [facility] at [facility] OR    Exam under anesthesia (ADD ON ) N/A 2018    Performed by NICKIE Alvarez MD at [facility] OR    Exam under anesthesia -cervical  suturing  N/A 7/26/2018    Performed by [facility], [facility] at [facility] OR    FISTULOGRAM Left 12/4/2015    Performed by [facility] at [facility] CATH LAB    LIVER BIOPSY      LIVER TRANSPLANT  09/1992    SUTURE REPAIR,CERVIX  6/19/2018    Performed by [facility] at [facility] OR    TUBAL LIGATION  2010       Review of patient's allergies indicates:   Allergen Reactions    Chloral hydrate Hallucinations     Other reaction(s): Hallucinations  Other reaction(s): Hives    Hydrocodone Other (See Comments)     Mental status changes     No current facility-administered medications for this encounter.      Current Outpatient Medications   Medication    carvedilol (COREG) 25 MG tablet    hydrALAZINE (APRESOLINE) 100 MG tablet    irbesartan (AVAPRO) 300 MG tablet    lacosamide (VIMPAT) 50 mg Tab    levETIRAcetam (KEPPRA) 500 MG Tab    sevelamer carbonate (RENVELA) 800 mg Tab    verapamil (CALAN-SR) 240 MG CR tablet    aspirin 81 MG Chew    cinacalcet (SENSIPAR) 30 MG Tab    famotidine (PEPCID) 40 MG tablet    tacrolimus (PROGRAF) 1 MG Cap    triamcinolone acetonide 0.1% (KENALOG) 0.1 % ointment     Family History     Problem Relation (Age of Onset)    Cancer Sister    Heart attack Maternal Uncle    Hypertension Mother, Father        Tobacco Use    Smoking status: Never Smoker    Smokeless tobacco: Never Used   Substance and Sexual Activity    Alcohol use: No    Drug use: No    Sexual activity: No     Partners: Male     Review of Systems   Constitutional: Negative.    HENT: Negative.    Eyes: Positive for visual disturbance.        Pt reports blurred vision   Respiratory: Positive for shortness of breath.    Cardiovascular: Negative.    Gastrointestinal: Positive for abdominal pain and nausea.        Pt reports 5/10 abdominal pain and nausea that began yesterday; states has not eaten anything since day before yesterday.    Endocrine: Negative.    Genitourinary: Negative.    Musculoskeletal:  Negative.    Skin: Negative.    Allergic/Immunologic: Negative.    Neurological: Negative.    Hematological: Bruises/bleeds easily.   Psychiatric/Behavioral: Negative.      Objective:     Vital Signs (Most Recent):  Temp: 98.3 °F (36.8 °C) (03/12/19 0857)  Pulse: (!) 123 (03/12/19 1101)  Resp: 15 (03/12/19 1047)  BP: (!) 184/95 (03/12/19 1101)  SpO2: 99 % (03/12/19 1101)  O2 Device (Oxygen Therapy): room air (03/12/19 0734) Vital Signs (24h Range):  Temp:  [97.6 °F (36.4 °C)-98.3 °F (36.8 °C)] 98.3 °F (36.8 °C)  Pulse:  [] 123  Resp:  [11-29] 15  SpO2:  [97 %-100 %] 99 %  BP: (176-232)/() 184/95     Weight: 53.5 kg (118 lb) (03/12/19 0734)  Body mass index is 19.64 kg/m².  Body surface area is 1.57 meters squared.    No intake/output data recorded.    Physical Exam   Constitutional: She is oriented to person, place, and time. She appears well-developed and well-nourished.   HENT:   Head: Normocephalic and atraumatic.   Eyes: Conjunctivae and EOM are normal. Pupils are equal, round, and reactive to light.   Neck: Neck supple.   Cardiovascular: Normal rate, regular rhythm and intact distal pulses.   HR bounding   Pulmonary/Chest: Effort normal and breath sounds normal.   On O2 at 2L/NC at sat at 99%   Abdominal: Soft. Bowel sounds are normal. She exhibits distension. There is tenderness.   Musculoskeletal: Normal range of motion.   Neurological: She is alert and oriented to person, place, and time.   Skin: Skin is warm and dry. Unable to assess at fingers d/t pink nail polish.   Psychiatric: She has a normal mood and affect. Her behavior is normal. Judgment and thought content normal.   Nursing note and vitals reviewed.      Significant Labs:  All labs within the past 24 hours have been reviewed.    Significant Imaging:  Labs: Reviewed  ECG: Reviewed  X-Ray: Reviewed  CT: Reviewed

## 2019-03-12 NOTE — HPI
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 Norris)--who is at the bedside  "

## 2019-03-12 NOTE — SUBJECTIVE & OBJECTIVE
Medications Prior to Admission   Medication Sig Dispense Refill Last Dose    carvedilol (COREG) 25 MG tablet Take 1 tablet (25 mg total) by mouth 2 (two) times daily. 120 tablet 3 3/12/2019    hydrALAZINE (APRESOLINE) 100 MG tablet Take 1 tablet (100 mg total) by mouth every 8 (eight) hours. 180 tablet 3 3/12/2019    irbesartan (AVAPRO) 300 MG tablet Take 1 tablet (300 mg total) by mouth once daily. (Patient taking differently: Take 300 mg by mouth every morning. ) 30 tablet 3 3/12/2019    lacosamide (VIMPAT) 50 mg Tab Take 1 tablet (50 mg total) by mouth 2 (two) times daily. 60 tablet 11 3/11/2019    levETIRAcetam (KEPPRA) 500 MG Tab Take 1 tablet (500 mg total) by mouth 2 (two) times daily. 60 tablet 11 3/11/2019    sevelamer carbonate (RENVELA) 800 mg Tab Take 1 tablet (800 mg total) by mouth 3 (three) times daily with meals. 90 tablet 11 3/11/2019    verapamil (CALAN-SR) 240 MG CR tablet Take 1 tablet (240 mg total) by mouth every evening. 90 tablet 3 3/12/2019    aspirin 81 MG Chew Take 1 tablet (81 mg total) by mouth once daily.  0 Taking    cinacalcet (SENSIPAR) 30 MG Tab Take 1 tablet (30 mg total) by mouth daily with breakfast. (Patient taking differently: Take 30 mg by mouth daily with breakfast. On Dialysis days) 30 tablet 2 Taking    famotidine (PEPCID) 40 MG tablet Take 1 tablet (40 mg total) by mouth once daily. (Patient taking differently: Take 40 mg by mouth every morning. ) 30 tablet 11 Taking    tacrolimus (PROGRAF) 1 MG Cap Take 6 capsules (6 mg total) by mouth every 12 (twelve) hours. 360 capsule 11 Taking    triamcinolone acetonide 0.1% (KENALOG) 0.1 % ointment AAA on arms, legs, and neck bid x 1-2 wks then prn flares only 80 g 3 Taking       Review of patient's allergies indicates:   Allergen Reactions    Chloral hydrate Hallucinations     Other reaction(s): Hallucinations  Other reaction(s): Hives    Hydrocodone Other (See Comments)     Mental status changes       Past Medical  History:   Diagnosis Date    Anemia in ESRD (end-stage renal disease) 10/12/2015    dialysis tues, thursday, sat; access left arm    Chronic rejection of liver transplant 3/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 ()    Moderate protein-calorie malnutrition 2017    MRSA bacteremia 2017    Pneumonia     Prophylactic immunotherapy 2014    Renovascular hypertension 10/2/2015    Secondary hyperparathyroidism 2017    Seizures     Sialadenitis 3/21/2018    Thrombocytopenia 2016     Past Surgical History:   Procedure Laterality Date    BIOPSY, LIVER, TRANSJUGULAR APPROACH N/A 2018    Performed by [facility] at [facility] OR 2ND FLR    BIOPSY-LIVER N/A 2017    Performed by [facility] at [facility] OR 2ND FLR    BIOPSY-LIVER N/A 3/22/2016    Performed by [facility] at [facility] OR 2ND FLR     SECTION      x 2    CONIZATION-CERVICAL-LEEP N/A 6/15/2018    Performed by [facility]quin [facility] at [facility] OR    [id number]; upper extremity Left 2015    Performed by Idalia Diaz [facility] at [facility] OR 2ND FLR    EMBOLIZATION, BLOOD VESSEL N/A 2018    Performed by [facility]ren Ramos MD at [facility] CATH LAB    Exam Under Anesthesia N/A 2018    Performed by [facility] at [facility] OR 2ND FLR    Exam under anesthesia N/A 2018    Performed by [facility] at [facility] OR    Exam under anesthesia (ADD ON ) N/A 2018    Performed by NICKIE Alvarez MD at [facility] OR    Exam under anesthesia -cervical suturing  N/A  7/26/2018    Performed by [facility]i [facility]s [facility], [facility] at [facility] OR    FISTULOGRAM Left 12/4/2015    Per[facility] at [facility] CATH LAB    LIVER BIOPSY      LIVER TRANSPLANT  09/1992    SUTURE REPAIR,CERVIX  6/19/2018    Performed by [facility]quin [facility] at [facility] OR    TUBAL LIGATION  2010     Family History     Problem Relation (Age of Onset)    Cancer Sister    Heart attack Maternal Uncle    Hypertension Mother, Father        Tobacco Use    Smoking status: Never Smoker    Smokeless tobacco: Never Used   Substance and Sexual Activity    Alcohol use: No    Drug use: No    Sexual activity: No     Partners: Male     Review of Systems   Constitutional: no fever, chills or night sweats. + changes in weight   Eyes: no visual changes   ENT: no nasal congestion or sore throat   Respiratory: no cough. +shortness of breath   Cardiovascular: no chest pain or palpitations   Gastrointestinal: + n/v   Genitourinary: no hematuria or dysuria   Integument/Breast: no rash or pruritis   Hematologic/Lymphatic: no easy bruising or lymphadenopathy   Musculoskeletal: no arthralgias or myalgias.   Neurological: + seizure   Behavioral/Psych: no auditory or visual hallucinations   Endocrine: no heat or cold intolerance        Objective:     Weight: 53.5 kg (118 lb)  Body mass index is 19.64 kg/m².  Vital Signs (Most Recent):  Temp: 97.4 °F (36.3 °C) (03/12/19 1406)  Pulse: 105 (03/12/19 1532)  Resp: 17 (03/12/19 1406)  BP: (!) 141/82 (03/12/19 1406)  SpO2: 97 % (03/12/19 1406) Vital Signs (24h Range):  Temp:  [97.4 °F (36.3 °C)-98.3 °F (36.8 °C)] 97.4 °F (36.3 °C)  Pulse:  [] 105  Resp:  [9-29] 17  SpO2:  [97 %-100 %] 97 %  BP: (141-232)/() 141/82                           Hemodialysis AV Fistula Left forearm (Active)   Needle Size Buttonhole 10/6/2018  2:41 PM   Site Assessment Clean;Dry;Intact 11/16/2018 11:24 AM   Patency Present;Thrill;Bruit 11/16/2018 11:24 AM   Status Deaccessed 11/16/2018 11:24 AM    Flows Good 10/6/2018  2:41 PM   Dressing Intervention New dressing 10/6/2018  5:57 PM   Dressing Status Clean;Dry;Intact 10/7/2018  8:00 AM   Site Condition No complications 10/6/2018  5:57 PM   Dressing Pressure dressing 10/6/2018  5:57 PM   Drainage Description Other (Comment) 4/14/2018  5:26 PM       Neurosurgery Physical Exam    General: well developed, well nourished, no distress.   Head: normocephalic, atraumatic  Neurologic: Alert and oriented. Thought content appropriate.  GCS: Motor: 6/Verbal: 5/Eyes: 4 GCS Total: 15  Mental Status: Awake, Alert, Oriented x 4  Language: No aphasia  Speech: No dysarthria  Cranial nerves: face symmetric, tongue midline, CN II-XII grossly intact.   Eyes: pupils equal, round, reactive to light with accomodation, EOMI.   Pulmonary: normal respirations, no signs of respiratory distress  Abdomen: soft, distended, non-tender to light palpation  Sensory: intact to light touch throughout  Motor Strength: Moves all extremities spontaneously with good tone.  Full strength upper and lower extremities. No abnormal movements seen.     Strength  Deltoids Triceps Biceps Wrist Extension Wrist Flexion Hand    Upper: R 5/5 5/5 5/5 5/5 5/5 5/5    L 5/5 5/5 5/5 5/5 5/5 5/5     Iliopsoas Quadriceps Knee  Flexion Tibialis  anterior Gastro- cnemius EHL   Lower: R 5/5 5/5 5/5 5/5 5/5 5/5    L 5/5 5/5 5/5 5/5 5/5 5/5     Pronator Drift: no drift noted  Finger-to-nose: Intact bilaterally  Pino: absent  Clonus: absent  Babinski: absent  Pulses: 2+ and symmetric radial and dorsalis pedis.  Skin: Skin is warm, dry and intact.    Significant Labs:  Recent Labs   Lab 03/12/19  0810   GLU 96      K 4.7      CO2 21*   BUN 73*   CREATININE 10.6*   CALCIUM 9.0   MG 2.3     Recent Labs   Lab 03/12/19  0810 03/12/19  0821   WBC 3.70*  --    HGB 9.7*  --    HCT 29.7* 28*   PLT 54*  --      No results for input(s): LABPT, INR, APTT in the last 48 hours.  Microbiology Results (last 7 days)      ** No results found for the last 168 hours. **        Recent Lab Results       03/12/19  0821   03/12/19  0810        Immature Granulocytes   0.3     Immature Grans (Abs)   0.01  Comment:  Mild elevation in immature granulocytes is non specific and   can be seen in a variety of conditions including stress response,   acute inflammation, trauma and pregnancy. Correlation with other   laboratory and clinical findings is essential.       Albumin   3.0     Alkaline Phosphatase   861     ALT   47     Anion Gap   15     AST   34     Baso #   0.02     Basophil%   0.5     Total Bilirubin   1.0  Comment:  For infants and newborns, interpretation of results should be based  on gestational age, weight and in agreement with clinical  observations.  Premature Infant recommended reference ranges:  Up to 24 hours.............<8.0 mg/dL  Up to 48 hours............<12.0 mg/dL  3-5 days..................<15.0 mg/dL  6-29 days.................<15.0 mg/dL       BUN, Bld   73     Calcium   9.0     Chloride   102     CO2   21     Creatinine   10.6     Differential Method   Automated     eGFR if African American   5.1     eGFR if non    4.4  Comment:  Calculation used to obtain the estimated glomerular filtration  rate (eGFR) is the CKD-EPI equation.        Eos #   0.2     Eosinophil%   6.5     Glucose   96     Gran # (ANC)   2.8     Gran%   74.3     Hematocrit   29.7     Hemoglobin   9.7     Lactate, Saul   1.1  Comment:  Falsely low lactic acid results can be found in samples   containing >=13.0 mg/dL total bilirubin and/or >=3.5 mg/dL   direct bilirubin.       Lipase   31     Lymph #   0.5     Lymph%   13.5     Magnesium   2.3     MCH   25.3     MCHC   32.7     MCV   78     Mono #   0.2     Mono%   4.9     MPV   SEE COMMENT  Comment:  Result not available.     nRBC   0     Phosphorus   5.2     Platelets   54     POC BUN 59       POC Chloride 106       POC Creatinine 10.2       POC Glucose 95       POC Hematocrit 28        POC Ionized Calcium 1.02       POC Potassium 4.5       POC Sodium 139       POC TCO2 (MEASURED) 22       Potassium   4.7     Total Protein   7.5     RBC   3.83     RDW   19.6     Sample MICHELL       Sodium   138     Troponin I   0.061  Comment:  The reference interval for Troponin I represents the 99th percentile   cutoff   for our facility and is consistent with 3rd generation assay   performance.       WBC   3.70         All pertinent labs from the last 24 hours have been reviewed.    Significant Diagnostics:  CT head: Evolving heterogeneous lucency and sclerosis of the calvarium compatible with sequela of hyperparathyroidism. 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 Mass effect from the expansile lesion on the left cerebral hemisphere with approximately 2-3 mm of rightward midline shift. Relative stable configuration of ventricles without hydrocephalus. No evidence for acute intracranial hemorrhage or significant new abnormal parenchymal attenuation.

## 2019-03-12 NOTE — HPI
27 yo female with PMHx hyperparathyroidism, ESRD, HTN, epilepsy, liver transplant in 1991 who presents with complaints of headache, n/v, abdominal pain x1 day while waiting to receive dialysis today. Hypertensive at dialysis, sent to ED for hypertensive emergency. Reports associated double vision when up. Headache now resolved after 1 dose of reglan given in ED. CT scan this morning, after CT patient stated she felt as though she was going to have a seizure. Mom at bedside noted jerking activity, nursing unsure of seizure activity. Patient conscious during activity. No post-ictal state after seizure. Patient takes Keppra 500mg BID and Vimpat 50mg BID at home for hx of epilepsy, has not had a seizure in years. Hyperparathyroidectomy scheduled for 3/27. Will receive dialysis today. NSGY consulted for CTH with left calvarium lesion with associated mass effect and 2mm MLS. Denies headache, weakness, numbness, paresthesias, b/b dysfunction, gait difficulty, f/c. Mom reports recent significant weight loss, patient states she has not had an appetite and sometimes goes 2-3 days without eating. No family hx of cancer. Takes ASA 81 mg daily.

## 2019-03-13 PROBLEM — K52.9 GASTROENTERITIS: Status: ACTIVE | Noted: 2019-01-01

## 2019-03-13 NOTE — PLAN OF CARE
Problem: Adult Inpatient Plan of Care  Goal: Plan of Care Review  Outcome: Ongoing (interventions implemented as appropriate)  Pt aaox3, vss, no s/s of distress noted.  Pt denies pain.  Dialysis tomorrow.  Safety precautions maintained, pt remains free of falls.  Telemetry.  Call light within reach.

## 2019-03-13 NOTE — MEDICAL/APP STUDENT
Discharge Summary  Hospital Medicine    Attending Provider on Discharge: Emily Powers MD  Hospital Medicine Team: [facility] MED D  Date of Admission:  3/12/2019     Date of Discharge:  3/13/2019  Code status: Full Code    Active Hospital Problems    Diagnosis  POA    *Hypertensive urgency [I16.0]  Yes    Gastroenteritis [K52.9]  Yes    Non-intractable vomiting with nausea [R11.2]  Yes    Brain lesion [G93.9]  Yes     27 yo female with PMHx hyperparathyroidism, ESRD, HTN, epilepsy, liver transplant in 1991 who presents with complaints of headache, n/v, abdominal pain x1 day while at dialysis today.     - Neurologically stable   - No acute neurosurgical intervention necessary at this time.   - CT head with left temporal calvarium lesion with surrounding slight mass effect and 2mm midline shift. New lesion in comparison to prior CT head 10/2018. No evidence for acute hemorrhage.   - Remainder of care per primary.  - Recommend consult to Epilepsy if suspect seizure activity.   - Follow-up in NSGY clinic with Dr. Powell. Patient will be called with appointment.   - Discussed with Dr. Powell.             Seizure-like activity [R56.9]  Yes    Seizures [R56.9]  Yes     No seizures in about 8 months. Episodes were likely provoked in the setting of acute illness. She should continue AED medications as long as they are well tolerated and causing no side effects until she is seizure free about one year, then at that time we can discuss weaning medications if she chooses.      Secondary hyperparathyroidism [N25.81]  Yes    Non compliance w medication regimen [Z91.14]  Not Applicable    Anemia in ESRD (end-stage renal disease) [N18.6, D63.1]  Yes     Chronic    Renovascular hypertension [I15.0]  Yes     Chronic    ESRD on hemodialysis [N18.6, Z99.2]  Not Applicable     Chronic   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 [Z94.4]  Not Applicable     Chronic     hemangioendothelioma s/p LTx (1992)        Resolved Hospital Problems    No resolved problems to display.        HPI  27 yo female with a history of ESRD (HD TThSat), liver transplantation (1992), anemia from CKD, and poorly controlled hypertension who presents with abdominal pain. Associated with nausea, near constant, non-bloody vomiting, as well as SOB and a dull, right sided headache. She also had diarrhea.  Started Monday 3/11. Unable to tolerate PO intake. Has blurry vision, but only on standing. No chest pain or lower extremity swelling. Referred to the ED from dialysis where she noted to have an elevated BP. Did not receive HD. History of poor compliance with liver transplant immunosuppression and antihypertensives with admissions for hypertensive emergency complicated by hypertensive retinopathy.  She states she has been taking her meds.  The patient felt better after receiving metoclopramide, hydralazine and carvedilol in ED.  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  She denies eating raw seafood or having sick contacts.  No one else at home has current symptoms.  She did have a flu shot.  No fever.  The patient had a spell in ED which was not though to be a seizure.  She states has has been compliant with her seizure meds.       Hospital Course  27 yo AA female with a history of ESRD (HD MWF), liver transplantation (1992), anemia from CKD, and poorly controlled hypertension who is admitted for hypertensive emergency. History of noncompliance to transplant immunosuppression and antihypertensives. 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 Hypertension well controlled on resumption of home regimen. Other issues include concerns for gastroenteritis (diarrhea resolved).     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 Bass, pt is anuric  · Home meds include sensipar 30 mg QD, scheduled for a parathyroidectomy on 3/27 (Dr. 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, without concerns for hydrocephalus and with no neurosurgical intervention recommended currently; she will f/u in clinic with NS  · Received HD with improvement of blood pressure (3/13)     GERD  · Home meds include famotidine     History of medication non-compliance  · Poor compliance with transplant immunosuppression and antihypertensives  · Verify dose of prograf as conflict with hepatology note and med list  · Psychiatry following     History of seizures  · Episode in ED witnessed and not though to be seizure  · Last 11/2018, now beyond the 1 year for which ppx required  · Home meds include vimpat and keppra  ·     Recent Labs   Lab 03/08/19  1340 03/12/19  0810 03/12/19  0821 03/13/19  0531   WBC 2.92* 3.70*  --  4.19   HGB 9.6* 9.7*  --  9.3*   HCT 29.6* 29.7* 28* 29.1*   PLT 69* 54*  --  61*      Recent Labs   Lab 03/08/19  1340 03/12/19  0810 03/13/19  0531    138 139   K 4.1 4.7 4.8    102 103   CO2 28 21* 21*   BUN 45* 73* 86*   CREATININE 8.0* 10.6* 11.8*   GLU 97 96 90   CALCIUM 8.4* 9.0 9.0   MG  --  2.3 2.3   PHOS  --  5.2* 6.1*   LIPASE  --  31  --      Recent Labs   Lab 03/08/19  1340 03/12/19  0810 03/13/19  0531   ALKPHOS 833* 861* 760*   ALT 50* 47* 32   AST 48* 34 21   ALBUMIN 3.0* 3.0* 2.7*   PROT 7.9 7.5 6.8   BILITOT 0.9 1.0 0.7      No results for input(s): POCTGLUCOSE in the last 168 hours.  Recent Labs     03/12/19  0810   TROPONINI 0.061*       Recent Labs     03/12/19  0810   LACTATE 1.1        Procedures:   HD    Consultants:  Consults (From admission, onward)        Status Ordering Provider     Inpatient consult to Nephrology  Once     Provider:  (Not yet assigned)    Completed QUYEN SHRSETHA     Inpatient consult to Neurosurgery  Once     Provider:  (Not yet assigned)    Acknowledged QUYEN SHRESTHA          Current Discharge Medication List      CONTINUE these medications which have CHANGED    Details   carvedilol (COREG) 25 MG tablet Take 1 tablet (25 mg total) by mouth 2 (two) times daily.  Qty: 60 tablet, Refills: 11    Associated Diagnoses: Renovascular hypertension      famotidine (PEPCID) 40 MG tablet Take 1 tablet (40 mg total) by mouth every morning.  Qty: 30 tablet, Refills: 11    Associated Diagnoses: Hematemesis with nausea      hydrALAZINE (APRESOLINE) 100 MG tablet Take 1 tablet (100 mg total) by mouth every 8 (eight) hours.  Qty: 90 tablet, Refills: 11    Associated Diagnoses: Hypertension, unspecified type      lacosamide (VIMPAT) 50 mg Tab Take 1 tablet (50 mg total) by mouth 2 (two) times daily.  Qty: 60 tablet, Refills: 11    Associated Diagnoses: Seizures      levETIRAcetam (KEPPRA) 500 MG Tab Take 1 tablet (500 mg total) by mouth 2 (two) times daily.  Qty: 60 tablet, Refills: 11    Associated Diagnoses: Seizures      sevelamer carbonate  (RENVELA) 800 mg Tab Take 1 tablet (800 mg total) by mouth 3 (three) times daily with meals.  Qty: 90 tablet, Refills: 11    Associated Diagnoses: Hyperphosphatemia         CONTINUE these medications which have NOT CHANGED    Details   irbesartan (AVAPRO) 300 MG tablet Take 1 tablet (300 mg total) by mouth once daily.  Qty: 30 tablet, Refills: 3    Associated Diagnoses: Hypertension, unspecified type      verapamil (CALAN-SR) 240 MG CR tablet Take 1 tablet (240 mg total) by mouth every evening.  Qty: 90 tablet, Refills: 3      cinacalcet (SENSIPAR) 30 MG Tab Take 1 tablet (30 mg total) by mouth daily with breakfast.  Qty: 30 tablet, Refills: 2      tacrolimus (PROGRAF) 1 MG Cap Take 6 capsules (6 mg total) by mouth every 12 (twelve) hours.  Qty: 360 capsule, Refills: 11    Associated Diagnoses: Liver transplanted      triamcinolone acetonide 0.1% (KENALOG) 0.1 % ointment AAA on arms, legs, and neck bid x 1-2 wks then prn flares only  Qty: 80 g, Refills: 3    Associated Diagnoses: Atopic dermatitis         STOP taking these medications       aspirin 81 MG Chew Comments:   Reason for Stopping:               Discharge Diet:renal diet     Activity: activity as tolerated    Discharge Condition: Good    Disposition: Home or Self Care    Tests pending at the time of discharge: none      Time spent  on the discharge of the patient including review of hospital course with the patient. reviewing discharge medications and arranging follow-up care 35 minutes    Discharge examination Patient was seen and examined on the date of discharge and determined to be suitable for discharge.    Discharge plan and follow up:      Future Appointments   Date Time Provider Department Center   3/19/2019  9:00 AM Jose Luis Powell MD [facility] NEUROS [facility]   3/25/2019  9:05 AM LAB, TRANSPLANT [facility] LABTX [facility]   3/25/2019 10:00 AM Lei Pinedo MD [facility] LIVERTX [facility]   4/5/2019  9:50 AM TED Crouch MD [facility]    4/29/2019  2:00 PM Lei Pinedo MD [facility] LIVERTX [facility]   5/6/2019  9:30 AM Sandra Magallon MD [facility] CARDIO [facility]       Provider  Emily Powers MD  Staff Hospitalist   [facility] [id number]      I personally scribed for Emily Powers MD on 03/13/2019 at 5:07 PM. Electronically signed by chang Fuller on 03/13/2019 at 5:07 PM

## 2019-03-13 NOTE — PROGRESS NOTES
Patient arrived via stretcher and weighed standing. Left forearm fistula buttonholes accessed per MARK Schwarz RN and secured. Patient a TTS chronic dialysis patient. HD initiated per md order. Patient arrived late; NAYAN Wan NP aware and approved 2 hour treatment. Lines secured. No complaints.

## 2019-03-13 NOTE — PROGRESS NOTES
HEMODIALYSIS NOTE  Patient evaluated while undergoing hemodialysis indicated for ESRD. Tolerating session with current UFR, no complications. . UF goal 2L as tolerated. Bps stable on HD    -pt to receive HD x 2 hrs 2/2 time of arrival from floor  -pt states to be discharged today after dialysis and to f/u w/ HD tomorrow for her usual tx  -hold epogen  -continue cinalcet; PTH 2396 3/8/19; prior to this hospitalization, pt was scheduled for parathyroidectomy 3/27/19  -continue sevelamer carbonate  -continue renal diet

## 2019-03-13 NOTE — PLAN OF CARE
Problem: Adult Inpatient Plan of Care  Goal: Plan of Care Review  Outcome: Ongoing (interventions implemented as appropriate)  Patient alert and oriented. Patient supine in bed. Resting quietly. Denies any complaints of pain or discomfort. Will continue to monitor.

## 2019-03-13 NOTE — MEDICAL/APP STUDENT
Hospital Medicine  Progress note    [place]: [facility] [place] Emily Powers MD  Admit Date: 3/12/2019  ELGIN 3/13/2019  Code status: Full Code  Length of Stay: 0 day(s)    Principal Problem:  Hypertensive urgency    Interval hx:      Review of Systems   Constitutional: Negative for chills, fever and malaise/fatigue.   Respiratory: Negative for shortness of breath.    Cardiovascular: Negative for chest pain and palpitations.   Gastrointestinal: Negative for abdominal pain, nausea and vomiting.   Neurological: Negative for weakness.   Psychiatric/Behavioral: Negative for depression. The patient is not nervous/anxious.        PEx  Temp:  [96.9 °F (36.1 °C)-98.9 °F (37.2 °C)]   Pulse:  []   Resp:  [9-27]   BP: (131-221)/()   SpO2:  [97 %-100 %]     Intake/Output Summary (Last 24 hours) at 3/13/2019 1005  Last data filed at 3/13/2019 0400  Gross per 24 hour   Intake 250 ml   Output --   Net 250 ml       Physical Exam   Constitutional: She is oriented to person, place, and time and well-developed, well-nourished, and in no distress. No distress.   Eyes: Pupils are equal, round, and reactive to light.   Cardiovascular: Normal rate and regular rhythm.   Pulmonary/Chest: Effort normal and breath sounds normal. No respiratory distress. She has no wheezes. She has no rales.   Abdominal: Soft. She exhibits no distension. There is no tenderness.   Musculoskeletal: She exhibits no edema.   Neurological: She is alert and oriented to person, place, and time.       Recent Labs   Lab 03/08/19  1340 03/12/19  0810 03/12/19  0821 03/13/19  0531   WBC 2.92* 3.70*  --  4.19   HGB 9.6* 9.7*  --  9.3*   HCT 29.6* 29.7* 28* 29.1*   PLT 69* 54*  --  61*     Recent Labs   Lab 03/08/19  1340 03/12/19  0810 03/13/19  0531    138 139   K 4.1 4.7 4.8    102 103   CO2 28 21* 21*   BUN 45* 73* 86*   CREATININE 8.0* 10.6* 11.8*   GLU 97 96 90   CALCIUM 8.4* 9.0 9.0   MG  --  2.3 2.3   PHOS  --  5.2* 6.1*   LIPASE  --  31  --       Recent Labs   Lab 03/08/19  1340 03/12/19  0810 03/13/19  0531   ALKPHOS 833* 861* 760*   ALT 50* 47* 32   AST 48* 34 21   ALBUMIN 3.0* 3.0* 2.7*   PROT 7.9 7.5 6.8   BILITOT 0.9 1.0 0.7      No results for input(s): POCTGLUCOSE in the last 168 hours.  Recent Labs     03/12/19  0810   TROPONINI 0.061*       Scheduled Meds:   sodium chloride 0.9%   Intravenous Once    carvedilol  25 mg Oral BID    [START ON 3/14/2019] cinacalcet  30 mg Oral Every Tues, Thurs, Sat    famotidine  40 mg Oral QAM    heparin (porcine)  5,000 Units Subcutaneous Q8H    hydrALAZINE  100 mg Oral Q8H    irbesartan  300 mg Oral QAM    lacosamide  50 mg Oral BID    levETIRAcetam  500 mg Oral BID    sevelamer carbonate  800 mg Oral TID WM    tacrolimus  6 mg Oral BID    verapamil  240 mg Oral QHS     Continuous Infusions:  As Needed:  sodium chloride 0.9%, acetaminophen, acetaminophen, dextrose 50%, dextrose 50%, glucagon (human recombinant), glucose, glucose, hydrALAZINE, ondansetron, prochlorperazine, sodium chloride 0.9%, sodium chloride 0.9%    Active Hospital Problems    Diagnosis  POA    *Hypertensive urgency [I16.0]  Yes    Gastroenteritis [K52.9]  Yes    Non-intractable vomiting with nausea [R11.2]  Yes    Brain lesion [G93.9]  Yes     29 yo female with PMHx hyperparathyroidism, ESRD, HTN, epilepsy, liver transplant in 1991 who presents with complaints of headache, n/v, abdominal pain x1 day while at dialysis today.     - Neurologically stable   - No acute neurosurgical intervention necessary at this time.   - CT head with left temporal calvarium lesion with surrounding slight mass effect and 2mm midline shift. New lesion in comparison to prior CT head 10/2018. No evidence for acute hemorrhage.   - Remainder of care per primary.  - Recommend consult to Epilepsy if suspect seizure activity.   - Follow-up in NSGY clinic with Dr. Powell. Patient will be called with appointment.   - Discussed with Dr. Powell.              Seizure-like activity [R56.9]  Yes    Seizures [R56.9]  Yes     No seizures in about 8 months. Episodes were likely provoked in the setting of acute illness. She should continue AED medications as long as they are well tolerated and causing no side effects until she is seizure free about one year, then at that time we can discuss weaning medications if she chooses.      Secondary hyperparathyroidism [N25.81]  Yes    Non compliance w medication regimen [Z91.14]  Not Applicable    Anemia in ESRD (end-stage renal disease) [N18.6, D63.1]  Yes     Chronic    Renovascular hypertension [I15.0]  Yes     Chronic    ESRD on hemodialysis [N18.6, Z99.2]  Not Applicable     Chronic   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 [Z94.4]  Not Applicable     Chronic     hemangioendothelioma s/p LTx (1992)        Resolved Hospital Problems   No resolved problems to display.       Overview  27 yo AA female with a history of ESRD (HD MWF), liver transplantation (1992), anemia from CKD, and poorly controlled hypertension who is admitted for hypertensive emergency.    Assessment and Plan for Problems addressed today:    Hypertensive urgency  Renovascular HTN  · History of poorly controlled hypertension, home meds include coreg, hydralazine, and verapamil  · Telemetry  · Resume home regimen with carvedilol, irbesartan, verapamil, renal diet once nausea improved  · Hydralazine prn     Gastroenteritis  · N/V and diarrhea; no recent antibiotics  · Antiemetics prn  · Stool culture ordered     History of liver transplant on chronic immunosuppression  · +ascites on exam, chornic and stable per patient  · Liver transplant at 1 year of age (1992) for hemangioendothelioma  · Home regimen includes tacrolimus  · Tacrolimus levels QD     ESRD on HD  Secondary hyperparathyroidism  Anemia in ESRD  Brown tumor  · HD TThSat, follows with Dr. Bass, pt is anuric  · Home meds include sensipar 30 mg QD, scheduled for a parathyroidectomy on 3/27 (  [place])  · Under evaluation for kidney transplant  · Nephrology consult for inpatient HD  · Neurosurgery consulted for CTH showing a left calvarium lesion with mass effect and midline shift, which is new compared to CTH from 10/2018 and was determined to be a brown tumor, without concerns for hydrocephalus and with no neurosurgical intervention recommended currently; she will f/u in clinic with NS     GERD  · Home meds include famotidine     History of medication non-compliance  · Poor compliance with transplant immunosuppression and antihypertensives  · Verify dose of prograf as conflict with hepatology note and med list  · Psychiatry following     History of seizures  · Episode in ED witnessed and not though to be seizure  · Last 11/2018, now beyond the 1 year for which ppx required  · Home meds include vimpat and keppra        Diet:  Diet clear liquid  GI PPx: famotidine  DVT PPx:   VTE Risk Mitigation (From admission, onward)        Ordered     Place LINDA hose  Until discontinued      03/13/19 0010     IP VTE HIGH RISK PATIENT  Once      03/13/19 0010     heparin (porcine) injection 5,000 Units  Every 8 hours      03/13/19 0010        Goals of Care: tolerating PO solid diet  Lines/ Drains/ Airways: PIV  Wounds: none  Discharge plan and follow up:   Neurosurgery (brown tumor, follow with Dr. Powell)      Provider   Emily Powers MD  Staff Hospitalist   Spectra [id number]    I personally scribed for Emily Powers MD on 03/13/2019 at 10:05 AM. Electronically signed by chang Fuller on 03/13/2019 at 10:05 AM

## 2019-03-13 NOTE — CONSULTS
"[facility]  Nephrology  Consult Note    Patient Name: Holly Patel  MRN: [id number]  Admission Date: 3/12/2019  Hospital Length of Stay: 0 days  Attending Provider: Emily Powers MD   Primary Care Physician: Stan Sosa MD  Principal Problem:Hypertensive urgency    Inpatient consult to Nephrology  Consult performed by: SAM Haskins  Consult ordered by: Sheila Orellana PA-C  Reason for consult: ESRD Management        Subjective:     HPI: 29 y/o  female with history of ESRD on HD, secondary hyperaparathyroidectomy (with scheduled OP parathyroidectomy 3/27/19), HTN, hematemesis with nausea (1/2019), S/P liver transplant (1992) for hemangioendothelioma, kidney transplant denial 2/2 noncompliance, depression, seizure (with episode of "jerking her entire body" per Anali' note (3/12/19 0932) states she presented to ED with hypertension and nausea    History obtained from the patient, the medical chart, and the patient's mother (Ms. Norris)--who is at the bedside    Past Medical History:   Diagnosis Date    Anemia in ESRD (end-stage renal disease) 10/12/2015    dialysis tues, thursday, sat; access left arm    Chronic rejection of liver transplant 3/22/2016    Depression     Encounter for blood transfusion     ESRD on hemodialysis 9/30/2015    History of recent hospitalization 05/2018    pneumonia    History of splenomegaly 4/12/2016    Immunosuppressed 8/5/2017    Iron deficiency anemia secondary to inadequate dietary iron intake 8/16/2017    She receives IV iron periodically at the Dialysis Center.   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)    Moderate protein-calorie malnutrition 8/16/2017    MRSA bacteremia 8/6/2017    Pneumonia     Prophylactic immunotherapy 8/4/2014    Renovascular hypertension 10/2/2015    Secondary hyperparathyroidism 8/5/2017    Seizures     Sialadenitis 3/21/2018    " Thrombocytopenia 2016       Past Surgical History:   Procedure Laterality Date    BIOPSY, LIVER, TRANSJUGULAR APPROACH N/A 2018    Performed by [facility] at [facility] OR 2ND FLR    BIOPSY-LIVER N/A 2017    Performed by [facility] OR 2ND FLR    BIOPSY-LIVER N/A 3/22/2016    Performed by [facility] OR 2ND FLR     SECTION      x 2    CONIZATION-CERVICAL-LEEP N/A 6/15/2018    Performed by Neelam Marroquin MD at [facility] OR    [id number]; upper extremity Left 2015    Performed by Idalia Diaz MD at [facility] OR 2ND FLR    EMBOLIZATION, BLOOD VESSEL N/A 2018    Performed by Aren Ramos MD at [facility] CATH LAB    Exam Under Anesthesia N/A 2018    Performed by [facility]am [facility]quin MD at [facility] OR 2ND FLR    Exam under anesthesia N/A 2018    Performed by [facility]am [facility]roquin MD at [facility] OR    Exam under anesthesia (ADD ON ) N/A 2018    Performed by NICKIE Alvarez MD at [facility] OR    Exam under anesthesia -cervical suturing  N/A 2018    Performed by Lei Sims III, MD at [facility] OR    FISTULOGRAM Left 2015    Performed by Idalia Diaz MD at [facility] CATH LAB    LIVER BIOPSY      LIVER TRANSPLANT  1992    SUTURE REPAIR,CERVIX  2018    Performed by [facility]am [facility]roquin MD at [facility] OR    TUBAL LIGATION         Review of patient's allergies indicates:   Allergen Reactions    Chloral hydrate Hallucinations     Other reaction(s): Hallucinations  Other reaction(s): Hives    Hydrocodone Other (See Comments)     Mental status changes     No current facility-administered medications for this encounter.      Current Outpatient Medications   Medication    carvedilol (COREG) 25 MG tablet    hydrALAZINE (APRESOLINE) 100 MG tablet    irbesartan (AVAPRO) 300 MG tablet    lacosamide (VIMPAT) 50 mg Tab    levETIRAcetam (KEPPRA) 500 MG Tab    sevelamer carbonate (RENVELA) 800 mg Tab     verapamil (CALAN-SR) 240 MG CR tablet    aspirin 81 MG Chew    cinacalcet (SENSIPAR) 30 MG Tab    famotidine (PEPCID) 40 MG tablet    tacrolimus (PROGRAF) 1 MG Cap    triamcinolone acetonide 0.1% (KENALOG) 0.1 % ointment     Family History     Problem Relation (Age of Onset)    Cancer Sister    Heart attack Maternal Uncle    Hypertension Mother, Father        Tobacco Use    Smoking status: Never Smoker    Smokeless tobacco: Never Used   Substance and Sexual Activity    Alcohol use: No    Drug use: No    Sexual activity: No     Partners: Male     Review of Systems   Constitutional: Negative.    HENT: Negative.    Eyes: Positive for visual disturbance.        Pt reports blurred vision   Respiratory: Positive for shortness of breath.    Cardiovascular: Negative.    Gastrointestinal: Positive for abdominal pain and nausea.        Pt reports 5/10 abdominal pain and nausea that began yesterday; states has not eaten anything since day before yesterday.    Endocrine: Negative.    Genitourinary: Negative.    Musculoskeletal: Negative.    Skin: Negative.    Allergic/Immunologic: Negative.    Neurological: Negative.    Hematological: Bruises/bleeds easily.   Psychiatric/Behavioral: Negative.      Objective:     Vital Signs (Most Recent):  Temp: 98.3 °F (36.8 °C) (03/12/19 0857)  Pulse: (!) 123 (03/12/19 1101)  Resp: 15 (03/12/19 1047)  BP: (!) 184/95 (03/12/19 1101)  SpO2: 99 % (03/12/19 1101)  O2 Device (Oxygen Therapy): room air (03/12/19 0734) Vital Signs (24h Range):  Temp:  [97.6 °F (36.4 °C)-98.3 °F (36.8 °C)] 98.3 °F (36.8 °C)  Pulse:  [] 123  Resp:  [11-29] 15  SpO2:  [97 %-100 %] 99 %  BP: (176-232)/() 184/95     Weight: 53.5 kg (118 lb) (03/12/19 0734)  Body mass index is 19.64 kg/m².  Body surface area is 1.57 meters squared.    No intake/output data recorded.    Physical Exam   Constitutional: She is oriented to person, place, and time. She appears well-developed and well-nourished.    HENT:   Head: Normocephalic and atraumatic.   Eyes: Conjunctivae and EOM are normal. Pupils are equal, round, and reactive to light.   Neck: Neck supple.   Cardiovascular: Normal rate, regular rhythm and intact distal pulses.   HR bounding   Pulmonary/Chest: Effort normal and breath sounds normal.   On O2 at 2L/NC at sat at 99%   Abdominal: Soft. Bowel sounds are normal. She exhibits distension. There is tenderness.   Musculoskeletal: Normal range of motion.   Neurological: She is alert and oriented to person, place, and time.   Skin: Skin is warm and dry. Unable to assess at fingers d/t pink nail polish.   Psychiatric: She has a normal mood and affect. Her behavior is normal. Judgment and thought content normal.   Nursing note and vitals reviewed.      Significant Labs:  All labs within the past 24 hours have been reviewed.    Significant Imaging:  Labs: Reviewed  ECG: Reviewed  X-Ray: Reviewed  CT: Reviewed    Assessment/Plan:     Non-intractable vomiting with nausea    -managed by Hospital Medicine     ESRD on hemodialysis    -Will hold HD until Neurosurgery eval  2/2 abnormal CT head  -Discussed with Primary Team  -Hep B panel, daily CBC, renal function panel  -Renal diet  -1 liter fluid restrictions per day  -Daily standing weights and chart  -Strict I/O and chart  -Medications to renal parameter    Dialysis Information: iHD  -Out patient HD unit: [facility]  -Nephrologist: Dr. Bass  -HD tx days: TTS  -HD tx time: 3.5 hours  -HD access: AVF  -Last HD: Sat, Mar 9th  -EDW: 50kg  -RRF: anuric    Anemia  -Hb > 7 gm/dL  -hold epogen    HTN  - Maintain MAP >65  - Keep target Bp <140/90         Thank you for your consult. I will follow-up with patient. Please contact us if you have any additional questions.    Pura Wan, SAM  Nephrology  [facility]

## 2019-03-13 NOTE — PLAN OF CARE
03/13/19 1352   Discharge Assessment   Assessment Type Discharge Planning Assessment   Confirmed/corrected address and phone number on facesheet? Yes   Assessment information obtained from? Patient;Medical Record   Expected Length of Stay (days) 3   Communicated expected length of stay with patient/caregiver yes  (Per MD)   Prior to hospitilization cognitive status: Alert/Oriented;No Deficits   Prior to hospitalization functional status: Independent   Current cognitive status: Alert/Oriented;No Deficits   Current Functional Status: Independent   Facility Arrived From: HD facility   Lives With parent(s)  (Mother)   Able to Return to Prior Arrangements yes   Is patient able to care for self after discharge? Yes   Who are your caregiver(s) and their phone number(s)? Myrna Randolph (Mother) [phone]   Patient's perception of discharge disposition home or selfcare   Readmission Within the Last 30 Days no previous admission in last 30 days   Patient currently being followed by outpatient case management? No   Patient currently receives home health services? No   Patient currently receives any other outside agency services? No   Equipment Currently Used at Home none   Do you have any problems affording any of your prescribed medications? No   Is the patient taking medications as prescribed? yes   Does the patient have transportation home? Yes   Transportation Anticipated family or friend will provide   Dialysis Name and Scheduled days [facility] (Tues-Thurs-Sat) 6:15am- 10:15am   Does the patient receive services at the Coumadin Clinic? No   Discharge Plan A Home with family   Discharge Plan B Home with family   DME Needed Upon Discharge  none   Patient/Family in Agreement with Plan yes     CM to the Bedside to see the patient. She states she resides with her Mother in a Single Story Home without [place]. The patient denies the use of DME and states she is independent and drives when needed. Current D/C plan is  Home with No Needs. CM name and number were placed on the board at the Bedside for the patient to call with D/C needs or questions. Understanding verbalized.    My Health Packet reviewed with the patient and left at the Bedside at this time.       1535 - CM called PCP's office to schedule a Follow Up appointment. No appointments are available in a reasonable time frame. Message was sent to the RN in the office to call CM to fit the patient in. CM will wait for a call back.

## 2019-03-13 NOTE — H&P
Physician Attestation for Scribe:  I, Emily Powers MD, personally performed the services described in this documentation. All medical record entries made by the scribe were at my direction and in my presence.  I have reviewed this note and agree that the record reflects my personal performance and is accurate and complete.       [facility] Medicine  History and Physical Exam    Team: [facility] MED D Emily Powers MD  Admit Date: 3/12/2019  ELGIN  TBD  Principal Problem:  Hypertensive urgency  Patient information was obtained from patient and ER records.   Primary care Physician: Stan Sosa MD  Code status: Full Code    HPI:   27 yo female with a history of ESRD (HD TThSat), liver transplantation (1992), anemia from CKD, and poorly controlled hypertension who presents with abdominal pain. Associated with nausea, near constant, non-bloody vomiting, as well as SOB and a dull, right sided headache. She also had diarrhea.  Started Monday 3/11. Unable to tolerate PO intake. Has blurry vision, but only on standing. No chest pain or lower extremity swelling. Referred to the ED from dialysis where she noted to have an elevated BP. Did not receive HD. History of poor compliance with liver transplant immunosuppression and antihypertensives with admissions for hypertensive emergency complicated by hypertensive retinopathy.  She states she has been taking her meds.  The patient felt better after receiving metoclopramide, hydralazine and carvedilol in ED.  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  She denies eating raw seafood or having sick contacts.  No one else at home has current symptoms.  She did have a flu shot.  No fever.  The patient had a spell in ED which was not though to be a seizure.  She states has has been compliant with her seizure meds.       Past Medical History: Patient has a past medical history of Anemia in ESRD (end-stage renal disease) (10/12/2015),  Chronic rejection of liver transplant (3/22/2016), Depression, Encounter for blood transfusion, ESRD on hemodialysis (2015), History of recent hospitalization (2018), History of splenomegaly (2016), Immunosuppressed (2017), Iron deficiency anemia secondary to inadequate dietary iron intake (2017), Liver replaced by transplant (9/10/2012), Moderate protein-calorie malnutrition (2017), MRSA bacteremia (2017), Pneumonia, Prophylactic immunotherapy (2014), Renovascular hypertension (10/2/2015), Secondary hyperparathyroidism (2017), Seizures, Sialadenitis (3/21/2018), and Thrombocytopenia (2016).    Past Surgical History: Patient has a past surgical history that includes Liver transplant (1992); Liver biopsy;  section; Tubal ligation (); Conization of cervix using loop electrosurgical excision procedure (LEEP) (N/A, 6/15/2018); Examination under anesthesia (N/A, 2018); Perineorrhaphy (2018); Examination under anesthesia (N/A, 2018); Embolization (N/A, 2018); Examination under anesthesia (N/A, 2018); Examination under anesthesia (N/A, 2018); and Transjugular biopsy of liver (N/A, 2018).    Social History: Patient reports that  has never smoked. she has never used smokeless tobacco. She reports that she does not drink alcohol or use drugs.    Family History: family history includes Cancer in her sister; Heart attack in her maternal uncle; Hypertension in her father and mother.    Medications:   Prior to Admission medications    Medication Sig Start Date End Date Taking? Authorizing Provider   carvedilol (COREG) 25 MG tablet Take 1 tablet (25 mg total) by mouth 2 (two) times daily. 19  Yes Sandra Magallon MD   hydrALAZINE (APRESOLINE) 100 MG tablet Take 1 tablet (100 mg total) by mouth every 8 (eight) hours. 19 Yes Sandra Magallon MD   irbesartan (AVAPRO) 300 MG tablet Take 1 tablet (300 mg total) by mouth once  daily.  Patient taking differently: Take 300 mg by mouth every morning.  12/5/18 3/12/19 Yes Lei Pinedo MD   lacosamide (VIMPAT) 50 mg Tab Take 1 tablet (50 mg total) by mouth 2 (two) times daily. 10/24/18 10/24/19 Yes Michael Mendoza MD   levETIRAcetam (KEPPRA) 500 MG Tab Take 1 tablet (500 mg total) by mouth 2 (two) times daily. 10/24/18 10/24/19 Yes Michael Mendoza MD   sevelamer carbonate (RENVELA) 800 mg Tab Take 1 tablet (800 mg total) by mouth 3 (three) times daily with meals. 1/22/19 1/22/20 Yes SAM Haskins   verapamil (CALAN-SR) 240 MG CR tablet Take 1 tablet (240 mg total) by mouth every evening. 2/25/19 2/25/20 Yes Sandra Magallon MD   aspirin 81 MG Chew Take 1 tablet (81 mg total) by mouth once daily. 2/21/18 2/21/19  Farheen Tellez MD   cinacalcet (SENSIPAR) 30 MG Tab Take 1 tablet (30 mg total) by mouth daily with breakfast.  Patient taking differently: Take 30 mg by mouth daily with breakfast. On Dialysis days 9/20/18 9/20/19  Kennedy Loco MD   famotidine (PEPCID) 40 MG tablet Take 1 tablet (40 mg total) by mouth once daily.  Patient taking differently: Take 40 mg by mouth every morning.  1/14/19   Stan Sosa MD   tacrolimus (PROGRAF) 1 MG Cap Take 6 capsules (6 mg total) by mouth every 12 (twelve) hours. 11/28/18 11/28/19  Lei Pinedo MD   triamcinolone acetonide 0.1% (KENALOG) 0.1 % ointment AAA on arms, legs, and neck bid x 1-2 wks then prn flares only 12/31/14   Daina Grider MD       Allergies: Patient is allergic to chloral hydrate and hydrocodone.    Review of Systems   Constitutional: Negative for chills, fever and malaise/fatigue.   HENT: Negative for congestion and sore throat.    Respiratory: Positive for cough (non-productive). Negative for shortness of breath.    Cardiovascular: Negative for chest pain and palpitations.   Gastrointestinal: Negative for abdominal pain, nausea and vomiting.   Genitourinary: Negative for flank pain.         Anuric chronically   Musculoskeletal: Negative for falls and myalgias.   Skin: Negative for itching and rash.   Neurological: Positive for headaches. Negative for focal weakness, seizures and weakness.   Psychiatric/Behavioral: Negative for depression. The patient is not nervous/anxious.        PEx  Temp:  [97.6 °F (36.4 °C)-98.3 °F (36.8 °C)]   Pulse:  []   Resp:  [9-29]   BP: (161-232)/()   SpO2:  [97 %-100 %]   Body mass index is 19.64 kg/m².     Physical Exam   Constitutional: She is oriented to person, place, and time and well-developed, well-nourished, and in no distress. No distress.   HENT:   Head: Normocephalic and atraumatic.   Eyes: Conjunctivae and EOM are normal. No scleral icterus.   Neck: Neck supple.   Cardiovascular: Normal rate and regular rhythm.   Thrill to left wrist due to HD access.   Pulmonary/Chest: Effort normal and breath sounds normal. No respiratory distress. She has no wheezes. She has no rales.   Abdominal: Bowel sounds are normal. She exhibits distension. There is no tenderness. There is no rebound and no guarding.   Musculoskeletal: She exhibits no edema.   Neurological: She is alert and oriented to person, place, and time.   Skin: Skin is warm and dry.       No intake or output data in the 24 hours ending 03/12/19 1329  Recent Labs   Lab 03/08/19  1340 03/12/19  0810 03/12/19  0821   WBC 2.92* 3.70*  --    HGB 9.6* 9.7*  --    HCT 29.6* 29.7* 28*   PLT 69* 54*  --      Recent Labs   Lab 03/08/19  1340 03/12/19  0810    138   K 4.1 4.7    102   CO2 28 21*   BUN 45* 73*   CREATININE 8.0* 10.6*   GLU 97 96   CALCIUM 8.4* 9.0   MG  --  2.3   PHOS  --  5.2*   LIPASE  --  31     Recent Labs   Lab 03/08/19  1340 03/12/19  0810   ALKPHOS 833* 861*   ALT 50* 47*   AST 48* 34   ALBUMIN 3.0* 3.0*   PROT 7.9 7.5   BILITOT 0.9 1.0      No results for input(s): POCTGLUCOSE in the last 168 hours.  Recent Labs     03/12/19  0810   TROPONINI 0.061*       Recent Labs      03/12/19  0810   LACTATE 1.1        Active Hospital Problems    Diagnosis  POA    *Hypertensive urgency [I16.0]  Yes    Gastroenteritis [K52.9]  Yes    Non-intractable vomiting with nausea [R11.2]  Yes    Brain lesion [G93.9]  Yes     29 yo female with PMHx hyperparathyroidism, ESRD, HTN, epilepsy, liver transplant in 1991 who presents with complaints of headache, n/v, abdominal pain x1 day while at dialysis today.     - Neurologically stable   - No acute neurosurgical intervention necessary at this time.   - CT head with left temporal calvarium lesion with surrounding slight mass effect and 2mm midline shift. New lesion in comparison to prior CT head 10/2018. No evidence for acute hemorrhage.   - Remainder of care per primary.  - Recommend consult to Epilepsy if suspect seizure activity.   - Follow-up in NSGY clinic with Dr. Powell. Patient will be called with appointment.   - Discussed with Dr. Powell.             Seizure-like activity [R56.9]  Yes    Seizures [R56.9]  Yes     No seizures in about 8 months. Episodes were likely provoked in the setting of acute illness. She should continue AED medications as long as they are well tolerated and causing no side effects until she is seizure free about one year, then at that time we can discuss weaning medications if she chooses.      Secondary hyperparathyroidism [N25.81]  Yes    Non compliance w medication regimen [Z91.14]  Not Applicable    Anemia in ESRD (end-stage renal disease) [N18.6, D63.1]  Yes     Chronic    Renovascular hypertension [I15.0]  Yes     Chronic    ESRD on hemodialysis [N18.6, Z99.2]  Not Applicable     Chronic    Liver replaced by transplant [Z94.4]  Not Applicable     Chronic     hemangioendothelioma s/p LTx (1992)        Resolved Hospital Problems   No resolved problems to display.         Overview  29 yo AA female with a history of ESRD (HD MWF), liver transplantation (1992), anemia from CKD, and poorly controlled hypertension who is  admitted for hypertensive emergency.      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 Bass, pt is anuric  · Home meds include sensipar 30 mg QD, scheduled for a parathyroidectomy on 3/27 (Dr. 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, without concerns for hydrocephalus and with no neurosurgical intervention recommended currently; she will f/u in clinic with NS    GERD  · Home meds include famotidine    History of medication non-compliance  · Poor compliance with transplant immunosuppression and antihypertensives  · Verify dose of prograf as conflict with hepatology note and med list    History of seizures  · Episode in ED witnessed and not though to be seizure  · Last 11/2018, now beyond the 1 year for which ppx required  · Home meds include vimpat and keppra    Diet:  Diet clear liquid and advance as tolerated  GI PPx:  famotidine  DVT PPx:   VTE Risk Mitigation (From admission, onward)        Ordered     Place LINDA hose  Until discontinued      03/13/19 0010     IP VTE HIGH  RISK PATIENT  Once      03/13/19 0010     heparin (porcine) injection 5,000 Units  Every 8 hours      03/13/19 0010        Goals of care: tolerating po solid diet  Lines/Drains/Airways: PIV  Wounds: none  Discharge plan and follow up:   Neurosurgery (brown tumor, follow with Dr. Powell)    Provider   Emily Powers MD  Staff Hospitalist   [facility] [id number]    I personally scribed for Emily Powers MD on 03/12/2019 at 1:29 PM. Electronically signed by chang Fuller on 03/12/2019 at 1:29 PM

## 2019-03-14 NOTE — NURSING
Patient discharged, iv and tele removed, patient did not want to take any of her night time meds. Provided discharge instructions to pt and family.    Patient left unit via wheelchair, rolled out by family who provided transportation. In NAD, no questions or concerns.

## 2019-03-14 NOTE — PLAN OF CARE
Problem: Adult Inpatient Plan of Care  Goal: Plan of Care Review  Outcome: Ongoing (interventions implemented as appropriate)  Patient tolerated 2 hour treatment well. UF 2300 ml. VS stable. Needles pulled; pressure held X 5 minutes. Hemostasis achieved. Pressure dressings applied. + thrill and bruit. Patient without complaint.

## 2019-03-15 NOTE — TELEPHONE ENCOUNTER
----- Message from Marilu Diaz sent at 3/13/2019  3:35 PM CDT -----  Contact: Pt nurse  Marifer  Pt nurse  Marifer is requesting a call back in requesting a call back in regards to scheduling pt a hospital f/u appt.        Pt nurse  Marifer can be contacted at [phone]

## 2019-03-21 NOTE — DISCHARGE SUMMARY
Physician Attestation for Scribe:  I, Emily Powers MD, personally performed the services described in this documentation. All medical record entries made by the scribe were at my direction and in my presence.  I have reviewed this note and agree that the record reflects my personal performance and is accurate and complete.       Discharge Summary  Hospital Medicine    Attending Provider on Discharge: Emily Powers MD  Hospital Medicine Team: [facility] [place] [place] [facility] MED D  Date of Admission:  3/12/2019     Date of Discharge:  3/13/2019  Code status: Full Code    Active Hospital Problems    Diagnosis  POA    *Hypertensive urgency [I16.0]  Yes    Gastroenteritis [K52.9]  Yes    Non-intractable vomiting with nausea [R11.2]  Yes    Brain lesion [G93.9]  Yes     27 yo female with PMHx hyperparathyroidism, ESRD, HTN, epilepsy, liver transplant in 1991 who presents with complaints of headache, n/v, abdominal pain x1 day while at dialysis today.     - Neurologically stable   - No acute neurosurgical intervention necessary at this time.   - CT head with left temporal calvarium lesion with surrounding slight mass effect and 2mm midline shift. New lesion in comparison to prior CT head 10/2018. No evidence for acute hemorrhage.   - Remainder of care per primary.  - Recommend consult to Epilepsy if suspect seizure activity.   - Follow-up in NSGY clinic with Dr. Powell. Patient will be called with appointment.   - Discussed with Dr. Powell.             Seizure-like activity [R56.9]  Yes    Seizures [R56.9]  Yes     No seizures in about 8 months. Episodes were likely provoked in the setting of acute illness. She should continue AED medications as long as they are well tolerated and causing no side effects until she is seizure free about one year, then at that time we can discuss weaning medications if she chooses.      Secondary hyperparathyroidism [N25.81]  Yes    Non compliance w medication regimen [Z91.14]  Not Applicable    Anemia  in ESRD (end-stage renal disease) [N18.6, D63.1]  Yes     Chronic    Renovascular hypertension [I15.0]  Yes     Chronic    ESRD on hemodialysis [N18.6, Z99.2]  Not Applicable     Chronic    Liver replaced by transplant [Z94.4]  Not Applicable     Chronic     hemangioendothelioma s/p LTx (1992)        Resolved Hospital Problems   No resolved problems to display.        HPI  29 yo female with a history of ESRD (HD TThSat), liver transplantation (1992), anemia from CKD, and poorly controlled hypertension who presents with abdominal pain. Associated with nausea, near constant, non-bloody vomiting, as well as SOB and a dull, right sided headache. She also had diarrhea.  Started Monday 3/11. Unable to tolerate PO intake. Has blurry vision, but only on standing. No chest pain or lower extremity swelling. Referred to the ED from dialysis where she noted to have an elevated BP. Did not receive HD. History of poor compliance with liver transplant immunosuppression and antihypertensives with admissions for hypertensive emergency complicated by hypertensive retinopathy.  She states she has been taking her meds.  The patient felt better after receiving metoclopramide, hydralazine and carvedilol in ED.  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  She denies eating raw seafood or having sick contacts.  No one else at home has current symptoms.  She did have a flu shot.  No fever.  The patient had a spell in ED which was not though to be a seizure.  She states has has been compliant with her seizure meds.       Hospital Course  29 yo AA female with a history of ESRD (HD MWF), liver transplantation (1992), anemia from CKD, and poorly controlled hypertension who is admitted for hypertensive emergency. History of noncompliance to transplant immunosuppression and antihypertensives. Neurosurgery consulted for CTH showing a left calvarium lesion with mass effect and midline shift,  which they determined to be a likely brown tumor with no concerns for hydrocephalus and with no intervention recommended. Hypertension well controlled on resumption of home regimen. Other issues include concerns for gastroenteritis (diarrhea resolved).     Hypertensive urgency, resolved  Renovascular HTN  · History of poorly controlled hypertension, home meds include coreg, hydralazine, and verapamil  · Telemetry  · Resume home regimen with carvedilol, irbesartan, verapamil, renal diet once nausea improved  · Hydralazine prn   · Resolved, excellent response to antihypertensives     Gastroenteritis  · N/V and diarrhea; no recent antibiotics  · Antiemetics prn  · Stool culture ordered  · On discharge, diarrhea resolved (3/13)     History of liver transplant on chronic immunosuppression  · +ascites on exam, chornic and stable per patient  · Liver transplant at 1 year of age (1992) for hemangioendothelioma  · Home regimen includes tacrolimus  · Tacrolimus levels QD     ESRD on HD  Secondary hyperparathyroidism  Anemia in ESRD  Brown tumor  · HD TThSat, follows with Dr. Bass, pt is anuric  · Home meds include sensipar 30 mg QD, scheduled for a parathyroidectomy on 3/27 (Dr. 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, without concerns for hydrocephalus and with no neurosurgical intervention recommended currently; she will f/u in clinic with NS  · Received HD with improvement of blood pressure (3/13)     GERD  · Home meds include famotidine     History of medication non-compliance  · Poor compliance with transplant immunosuppression and antihypertensives  · Verify dose of prograf as conflict with hepatology note and med list  · Psychiatry following     History of seizures  · Episode in ED  witnessed and not though to be seizure  · Last 11/2018, now beyond the 1 year for which ppx required  · Home meds include vimpat and keppra  ·     Recent Labs   Lab 03/08/19  1340 03/12/19  0810 03/12/19  0821 03/13/19  0531   WBC 2.92* 3.70*  --  4.19   HGB 9.6* 9.7*  --  9.3*   HCT 29.6* 29.7* 28* 29.1*   PLT 69* 54*  --  61*     Recent Labs   Lab 03/08/19  1340 03/12/19  0810 03/13/19  0531    138 139   K 4.1 4.7 4.8    102 103   CO2 28 21* 21*   BUN 45* 73* 86*   CREATININE 8.0* 10.6* 11.8*   GLU 97 96 90   CALCIUM 8.4* 9.0 9.0   MG  --  2.3 2.3   PHOS  --  5.2* 6.1*   LIPASE  --  31  --      Recent Labs   Lab 03/08/19  1340 03/12/19  0810 03/13/19  0531   ALKPHOS 833* 861* 760*   ALT 50* 47* 32   AST 48* 34 21   ALBUMIN 3.0* 3.0* 2.7*   PROT 7.9 7.5 6.8   BILITOT 0.9 1.0 0.7      No results for input(s): POCTGLUCOSE in the last 168 hours.  Recent Labs     03/12/19  0810   TROPONINI 0.061*       Recent Labs     03/12/19  0810   LACTATE 1.1        Procedures:   HD    Consultants:  Consults (From admission, onward)        Status Ordering Provider     Inpatient consult to Nephrology  Once     Provider:  (Not yet assigned)    Completed QUYEN SHRESTHA     Inpatient consult to Neurosurgery  Once     Provider:  (Not yet assigned)    Acknowledged QUYEN SHRESTHA          Current Discharge Medication List      CONTINUE these medications which have CHANGED    Details   carvedilol (COREG) 25 MG tablet Take 1 tablet (25 mg total) by mouth 2 (two) times daily.  Qty: 60 tablet, Refills: 11    Associated Diagnoses: Renovascular hypertension      famotidine (PEPCID) 40 MG tablet Take 1 tablet (40 mg total) by mouth every morning.  Qty: 30 tablet, Refills: 11    Associated Diagnoses: Hematemesis with nausea      hydrALAZINE (APRESOLINE) 100 MG tablet Take 1 tablet (100 mg total) by mouth every 8 (eight) hours.  Qty: 90 tablet, Refills: 11    Associated Diagnoses: Hypertension, unspecified type       lacosamide (VIMPAT) 50 mg Tab Take 1 tablet (50 mg total) by mouth 2 (two) times daily.  Qty: 60 tablet, Refills: 11    Associated Diagnoses: Seizures      levETIRAcetam (KEPPRA) 500 MG Tab Take 1 tablet (500 mg total) by mouth 2 (two) times daily.  Qty: 60 tablet, Refills: 11    Associated Diagnoses: Seizures      sevelamer carbonate (RENVELA) 800 mg Tab Take 1 tablet (800 mg total) by mouth 3 (three) times daily with meals.  Qty: 90 tablet, Refills: 11    Associated Diagnoses: Hyperphosphatemia         CONTINUE these medications which have NOT CHANGED    Details   irbesartan (AVAPRO) 300 MG tablet Take 1 tablet (300 mg total) by mouth once daily.  Qty: 30 tablet, Refills: 3    Associated Diagnoses: Hypertension, unspecified type      verapamil (CALAN-SR) 240 MG CR tablet Take 1 tablet (240 mg total) by mouth every evening.  Qty: 90 tablet, Refills: 3      cinacalcet (SENSIPAR) 30 MG Tab Take 1 tablet (30 mg total) by mouth daily with breakfast.  Qty: 30 tablet, Refills: 2      tacrolimus (PROGRAF) 1 MG Cap Take 6 capsules (6 mg total) by mouth every 12 (twelve) hours.  Qty: 360 capsule, Refills: 11    Associated Diagnoses: Liver transplanted      triamcinolone acetonide 0.1% (KENALOG) 0.1 % ointment AAA on arms, legs, and neck bid x 1-2 wks then prn flares only  Qty: 80 g, Refills: 3    Associated Diagnoses: Atopic dermatitis         STOP taking these medications       aspirin 81 MG Chew Comments:   Reason for Stopping:               Discharge Diet:renal diet     Activity: activity as tolerated    Discharge Condition: Good    Disposition: Home or Self Care    Tests pending at the time of discharge: none      Time spent  on the discharge of the patient including review of hospital course with the patient. reviewing discharge medications and arranging follow-up care 35 minutes    Discharge examination Patient was seen and examined on the date of discharge and determined to be suitable for discharge.    Discharge  plan and follow up:      Future Appointments   Date Time Provider Department Center   3/19/2019  9:00 AM Jose Luis Powell MD [facility] NEUROSC [facility]   3/25/2019  9:05 AM LAB, TRANSPLANT [facility] LABTX [facility]   3/25/2019 10:00 AM Lei Pinedo MD [facility] LIVERTX [facility]   4/5/2019  9:50 AM TED Crouch MD [facility] OPHTHAL [facility]   4/29/2019  2:00 PM Lei Pinedo MD [facility] LIVERTX [facility]   5/6/2019  9:30 AM Sandra Magallon MD [facility] CARDIO [facility]       Provider  Emily Powers MD  Staff Hospitalist   [facility] [id number]      I personally scribed for Emily Powers MD on 03/13/2019 at 5:07 PM. Electronically signed by chang Fuller on 03/13/2019 at 5:07 PM

## 2019-03-22 NOTE — TELEPHONE ENCOUNTER
Called pt back to advise te Rx was called I twice. I called [facility] back to confirm receipt but was on hold a long time. Will call back.

## 2019-03-22 NOTE — PROGRESS NOTES
Called in Rocatrol 0.5 mcg BID (start 3/24/19 - 3 days before surgery 3/24/19). Dispense #6. No refills.

## 2019-03-22 NOTE — TELEPHONE ENCOUNTER
Called pt to advise a Rx was called in for Rocatrol 0.5 mcg twice a day for 3 days before surgery (start Sunday and finish Tuesday).  from [facility] in [place]. Advised Dr. Winter would like her to have a repeat CBC on Monday to check her platelets. She has an lab appt already scheduled for Monday AM in the Liver Transplant clinic.     Platelets have been ordered to be on hold for surgery Wed, 3/27/2019.

## 2019-03-25 NOTE — PROGRESS NOTES
Subjective:       Patient ID: Holly Patel is a 28 y.o. female.    Chief Complaint: Follow-up (ED)    Patient is here for followup for chronic conditions.    Recent hosp stay for gastroenteritis, symptoms have resolved.    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    Head CT scan showed brown tumor spots L side of franklin thought to be 2/2 hyperpara, she is having symptomatic headaches as a result.    Having parathyroid surg in 2 days.    No new other symptoms.    Claims good HD adh.    Chronic abd distension is stable, does not go down after HD.    Review of Systems   Constitutional: Positive for fatigue. Negative for activity change, diaphoresis, fever and unexpected weight change.   Respiratory: Positive for shortness of breath (stable). Negative for chest tightness and wheezing.    Cardiovascular: Negative for chest pain, palpitations and leg swelling.   Gastrointestinal: Positive for abdominal distention. Negative for nausea and vomiting.   Skin: Negative for rash and wound.   Neurological: Negative for tremors and weakness.        L sided frontal headache   Hematological: Negative for adenopathy. Does not bruise/bleed easily.   Psychiatric/Behavioral: Negative for confusion and dysphoric mood.       Objective:      Physical Exam   Constitutional: She is oriented to person, place, and time. She appears well-developed and well-nourished. No distress.   HENT:   Head: Normocephalic and atraumatic.   Normal jaw open and closure. No focal temporal or TMJ tenderness to deep palpation bilat.   Eyes: Pupils are equal, round, and reactive to light. No scleral icterus.   Neck: Normal range of motion. No thyromegaly present.   Cardiovascular: Normal rate and regular rhythm. Exam reveals no gallop and no friction rub.   Murmur (mild HSM murmur at apex) heard.  Laying flat w/o any diff   Pulmonary/Chest: Effort normal and breath sounds normal. No respiratory distress. She has no wheezes. She has  no rales.   Abdominal: Soft. Bowel sounds are normal. She exhibits distension (moderate). She exhibits no mass. There is no tenderness. There is no rebound and no guarding.   Musculoskeletal: Normal range of motion. She exhibits no edema or tenderness.   L arm fistula thrill normal, nontender     Lymphadenopathy:     She has no cervical adenopathy.   Neurological: She is alert and oriented to person, place, and time.   Skin: She is not diaphoretic.   No lip hair growth, none on chest as well.     Psychiatric: She has a normal mood and affect. Her speech is normal and behavior is normal. Cognition and memory are normal.       Assessment:       No diagnosis found.    Plan:       Here for follow up.    HTN now well controlled.    L sided skull pain likely from brown tumor from hyperpara, hopefully surgery will help.    preop -- pt is at low to moderate risk for surgery, ok to proceed for this moderately risky procedure.    There are no diagnoses linked to this encounter.    Health Maintenance       Date Due Completion Date    Pap Smear 02/27/2022 2/27/2019    Pneumococcal Vaccine (Highest Risk) (3 of 3 - PPSV23) 09/26/2023 9/26/2018    TETANUS VACCINE 10/12/2028 10/12/2018          Follow up in about 4 months (around 7/25/2019).    Future Appointments   Date Time Provider Department Center   3/25/2019  1:00 PM Stan Sosa MD [facility] IM [facility]   4/5/2019  9:50 AM TED Crouch MD [facility] OPHTHAL [facility]   4/9/2019 11:45 AM Jose Luis Powell MD [facility] NEUROSC [facility]   4/29/2019  2:00 PM Lei Pinedo MD [facility] LIVERTX [facility]   5/6/2019  9:30 AM Sandra Magallon MD [facility] CARDIO [facility]

## 2019-03-25 NOTE — Clinical Note
Increase tac to 7 mg BID- told herKidney team- Can you please update her on her kidney Tx evaluation status?

## 2019-03-25 NOTE — LETTER
March 25, 2019        Enrrique Moise  [address]  [facility] [address]  Phone: [phone]  Fax: [phone]             [facility]  [address]  [facility] [address]  Phone: [phone]   Patient: Holly Patel   MR Number: [id number]   YOB: 1990   Date of Visit: 3/25/2019       Dear Dr. Enrrique Moise    Thank you for referring Holly Patel to me for evaluation. Attached you will find relevant portions of my assessment and plan of care.    If you have questions, please do not hesitate to call me. I look forward to following Holly Patel along with you.    Sincerely,    Lei Pinedo MD    Enclosure    If you would like to receive this communication electronically, please contact [email or web address] or [phone] to request [employer] Link access.    [employer] Link is a tool which provides read-only access to select patient information with whom you have a relationship. Its easy to use and provides real time access to review your patients record including encounter summaries, notes, results, and demographic information.    If you feel you have received this communication in error or would no longer like to receive these types of communications, please e-mail [email or web address]

## 2019-03-25 NOTE — PROGRESS NOTES
Subjective:       Patient ID: Holly Patel is a 28 y.o. female.    Chief Complaint: Liver Transplant Follow-up    HPI  I saw this 28 year old  lady in the liver transplant clinic. She is currently on HD three times weekly because of hypertension induced and chronic immunosuppression- induced kidney failure.    Had been on home dialysis- recently switched to outpt HD  On HD since Oct 2015, home HD since Feb 2016.    - hypertensive emergency and tonic clonic seizure  - started on anti-epileptics    She had high LFTs in June 2017 and a liver biopsy showed stage 2-3 fibrosis as well as evidence of chronic rejection.    She is currently on Tac 1mg BID and finally appears to be taking it more regularly.    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  Denied kidney Tx for compliance but in evaluation again    She has certainly has a lot of undetectable tacrolimus blood levels but more recently this seems to have changed for the better.    She used to come to clinic with uncontrolled hypertension but today her BP was 116/57.    She has had 3 liver biopsies since 2016 and her latest one was in Nov 2018  - this did show chronic rejection/ductopenia but only stage 2 fibrosis.    Her imaging shows ascites but in the absence of obvious portal hypertension, this maybe related to her kidney failure.    Recent admission with hypertensive crisis  Discovered to have a brown tumor- known secondary hyperparathyroidism  Due for parathyroid surgery this week.    Liver biopsy: Nov 2018  No acute cellular rejection  Bile ducts with atrophic changes and periductular metaplasia (CK 7 immunostain), see comment  Rare lobular apoptotic bodies, no viral inclusions (CMV immunostain negative)  No steatosis  Portal fibrosis with septae (stage 2 of 4)  Trace hepatocyte iron, abundant macrophage iron  Iron and trichrome stains with appropriate controls  COMMENT: Sections show bile ducts with atrophic type changes and the CK  7 highlights periportal ductular  metaplasia (more prominent than seen in the patient's prior biopsy, [id number], 6/16/2017); these findings are  suspicious for chronic rejection. Dr. LINNEA Tao has reviewed this case and concurs with the interpretation.    CT abdo: 1/17/2018  1.  Postsurgical changes consistent with liver transplantation with splenomegaly and moderate volume ascites.  2.  Otherwise no acute intra-abdominal abnormalities identified.  3.  Small bilateral pleural effusions, right greater than left with worsening patchy opacification/consolidation within the lower lobes may reflect worsening pulmonary edema versus atypical pneumonia.    MELD-Na score: 21 at 11/16/2018 10:21 AM  MELD score: 21 at 11/16/2018 10:21 AM  Calculated from:  Serum Creatinine: On dialysis. Using 4 mg/dL.  Serum Sodium: 138 mmol/L (Rounded to 137 mmol/L) at 11/15/2018 10:41 AM  Total Bilirubin: 1.0 mg/dL at 11/15/2018 10:41 AM  INR(ratio): 1.1 at 11/16/2018 10:21 AM  Age: 28 years        Lab Results   Component Value Date    ALT 31 03/25/2019    AST 33 03/25/2019     (H) 06/27/2012    ALKPHOS 775 (H) 03/25/2019    BILITOT 0.7 03/25/2019     Past Medical History:   Diagnosis Date    Anemia in ESRD (end-stage renal disease) 10/12/2015    dialysis tues, thursday, sat; access left arm    Chronic rejection of liver transplant 3/22/2016    Depression     Encounter for blood transfusion     ESRD on hemodialysis 9/30/2015    History of recent hospitalization 05/2018    pneumonia    History of splenomegaly 4/12/2016    Immunosuppressed 8/5/2017    Iron deficiency anemia secondary to inadequate dietary iron intake 8/16/2017    She receives IV iron periodically at the Dialysis Center.   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)    Moderate protein-calorie malnutrition 8/16/2017    MRSA bacteremia 8/6/2017    Pneumonia     Prophylactic immunotherapy 8/4/2014    Renovascular  hypertension 10/2/2015    Secondary hyperparathyroidism 2017    Seizures     Sialadenitis 3/21/2018    Thrombocytopenia 2016     Past Surgical History:   Procedure Laterality Date    BIOPSY, LIVER, TRANSJUGULAR APPROACH N/A 2018    Performed by [facility] OR 2ND FLR    BIOPSY-LIVER N/A 2017    [facility] OR 2ND FLR    BIOPSY-LIVER N/A 3/22/2016    [facility] OR 2ND FLR     SECTION      x 2    CONIZATION-CERVICAL-LEEP N/A 6/15/2018    [facility] OR    [id number]-[id number]-MP; upper extremity Left 2015    [facility] by [facility] OR 2ND FLR    EMBOLIZATION, BLOOD VESSEL N/A 2018    Performed by [facility]ren Ramos [facility] at [facility] CATH LAB    Exam Under Anesthesia N/A 2018    [facility] OR 2ND FLR    Exam under anesthesia N/A 2018    Per[facility] by [facility] at [facility] OR    Exam under anesthesia (ADD ON ) N/A 2018    Performed by NICKIE Alvarez MD at [facility] OR    Exam under anesthesia -cervical suturing  N/A 2018    Performed by Lei Sims III, MD at [facility] OR    FISTULOGRAM Left 2015    Performed by [facility] CATH LAB    LIVER BIOPSY      LIVER TRANSPLANT  1992    SUTURE REPAIR,CERVIX  2018    [facility] at [facility] OR    TUBAL LIGATION       Current Outpatient Medications   Medication Sig    carvedilol (COREG) 25 MG tablet Take 1 tablet (25 mg total) by mouth 2 (two) times daily.    cinacalcet (SENSIPAR) 30 MG Tab Take 1 tablet (30 mg total) by mouth daily with breakfast. (Patient taking differently: Take 30 mg by mouth daily with breakfast. On Dialysis days)    famotidine (PEPCID) 40 MG tablet Take 1 tablet (40 mg total) by mouth every morning.    hydrALAZINE (APRESOLINE) 100 MG tablet Take 1  tablet (100 mg total) by mouth every 8 (eight) hours.    irbesartan (AVAPRO) 300 MG tablet Take 1 tablet (300 mg total) by mouth once daily. (Patient taking differently: Take 300 mg by mouth every morning. )    lacosamide (VIMPAT) 50 mg Tab Take 1 tablet (50 mg total) by mouth 2 (two) times daily.    levETIRAcetam (KEPPRA) 500 MG Tab Take 1 tablet (500 mg total) by mouth 2 (two) times daily.    minoxidil (LONITEN) 2.5 MG tablet TAKE 2 TABLETS (5 MG TOTAL) BY MOUTH 2 (TWO) TIMES DAILY.    sevelamer carbonate (RENVELA) 800 mg Tab Take 1 tablet (800 mg total) by mouth 3 (three) times daily with meals.    tacrolimus (PROGRAF) 1 MG Cap Take 6 capsules (6 mg total) by mouth every 12 (twelve) hours.    triamcinolone acetonide 0.1% (KENALOG) 0.1 % ointment AAA on arms, legs, and neck bid x 1-2 wks then prn flares only    verapamil (CALAN-SR) 240 MG CR tablet Take 1 tablet (240 mg total) by mouth every evening.     No current facility-administered medications for this visit.      Review of Systems   Constitutional: Negative for activity change, appetite change, chills, fatigue, fever and unexpected weight change.   HENT: Negative for ear pain, hearing loss, nosebleeds, sore throat and trouble swallowing.    Eyes: Negative for redness and visual disturbance.   Respiratory: Negative for cough, chest tightness, shortness of breath and wheezing.    Cardiovascular: Negative for chest pain and palpitations.   Gastrointestinal: Negative for abdominal distention, abdominal pain, blood in stool, constipation, diarrhea, nausea and vomiting.   Genitourinary: Negative for difficulty urinating, dysuria, frequency, hematuria and urgency.   Musculoskeletal: Negative for arthralgias, back pain, gait problem, joint swelling and myalgias.   Skin: Negative for rash.   Neurological: Negative for tremors, seizures, speech difficulty, weakness and headaches.   Hematological: Negative for adenopathy.   Psychiatric/Behavioral: Negative for  "confusion, decreased concentration and sleep disturbance. The patient is not nervous/anxious.          Objective:    BP (!) 116/57 (BP Location: Right arm, Patient Position: Sitting, BP Method: Medium (Automatic))   Pulse 83   Temp 97.7 °F (36.5 °C) (Oral)   Resp 18   Ht 5' 4.84" (1.647 m)   Wt 53 kg (116 lb 13.5 oz)   SpO2 97%   BMI 19.54 kg/m²     Physical Exam   Constitutional: She appears well-developed and well-nourished. No distress.   HENT:   Head: Normocephalic.   Eyes: Pupils are equal, round, and reactive to light.   Neck: No JVD present. No tracheal deviation present. No thyromegaly present.   Cardiovascular: Normal rate, regular rhythm and normal heart sounds.   No murmur heard.  Pulmonary/Chest: Effort normal and breath sounds normal. No stridor.   Abdominal: Soft. Bowel sounds are normal. She exhibits distension. There is no tenderness.   Mild ascites   Musculoskeletal: She exhibits no edema.   Lymphadenopathy:        Head (right side): No submental, no submandibular, no tonsillar, no preauricular, no posterior auricular and no occipital adenopathy present.        Head (left side): No submental, no submandibular, no tonsillar, no preauricular, no posterior auricular and no occipital adenopathy present.     She has no cervical adenopathy.     She has no axillary adenopathy.   Neurological: She is alert. She has normal strength. She is not disoriented. No cranial nerve deficit or sensory deficit.   Skin: Skin is intact. No cyanosis.       Assessment:       1. Liver replaced by transplant    2. Seizure-like activity    3. Prophylactic immunotherapy    4. ESRD on hemodialysis    5. Elevated PTHrP level        Plan:   1. hypertension- seems to be under control  2. Increase tac to 7/7 mg   She has previously been warned  That she needs to remain compliant or she will lose her liver from chronic rejection and will likely not get another transplant.  3. Latest abdo US shows ascites- clinically better " than before. She thinks that the outpt HD is achieving better control of her fluid.  4. Recent seizures- neuro follow up    She certainly does not need liver transplantation at present and if she is a candidate for kidney Tx, this can proceed acknowledging that she has an element of chronic rejection in her liver.    Clinic in 6 months.

## 2019-03-26 NOTE — TELEPHONE ENCOUNTER
Pre-op Call:   Spoke with pt to inform of surgery start time (8a), arrival time (6a), and location ([facility]). Reminded about NPO after MN, washing with Hibiclens, making arrangements for someone to drive her home upon discharge. Discussed post-op appt, post-op labs work, and holding calcium the AM of post-op labs. She'll be going to Dialysis the AM of her post-op visit and will have blood work done there or here upon arrival.

## 2019-03-27 PROBLEM — E21.3 HYPERPARATHYROIDISM: Status: ACTIVE | Noted: 2019-01-01

## 2019-03-27 PROBLEM — N25.81 HYPERPARATHYROIDISM DUE TO END STAGE RENAL DISEASE ON DIALYSIS: Status: ACTIVE | Noted: 2019-01-01

## 2019-03-27 PROBLEM — Z99.2 HYPERPARATHYROIDISM DUE TO END STAGE RENAL DISEASE ON DIALYSIS: Status: ACTIVE | Noted: 2019-01-01

## 2019-03-27 PROBLEM — N18.6 HYPERPARATHYROIDISM DUE TO END STAGE RENAL DISEASE ON DIALYSIS: Status: ACTIVE | Noted: 2019-01-01

## 2019-03-27 NOTE — TRANSFER OF CARE
"Anesthesia Transfer of Care Note    Patient: Holly Patel    Procedure(s) Performed: Procedure(s) (LRB):  PARATHYROIDECTOMY, Minimally Invasive Bilateral Exploration (Bilateral)    Patient location: PACU    Anesthesia Type: general    Transport from OR: Transported from OR on 6-10 L/min O2 by face mask with adequate spontaneous ventilation    Post pain: adequate analgesia    Post assessment: no apparent anesthetic complications    Post vital signs: stable    Level of consciousness: awake and responds to stimulation    Nausea/Vomiting: no nausea/vomiting    Complications: none    Transfer of care protocol was followed      Last vitals:   Visit Vitals  BP (!) 147/86 (BP Location: Right arm, Patient Position: Lying)   Pulse 86   Temp 37.2 °C (98.9 °F) (Axillary)   Resp 16   Ht 5' 5" (1.651 m)   Wt 50.8 kg (112 lb)   SpO2 100%   Breastfeeding? No   BMI 18.64 kg/m²     "

## 2019-03-27 NOTE — ASSESSMENT & PLAN NOTE
The patient is a 28 year-old AA female with a medical history of HTN, ESRD, and OLTx who presented today for a sub-parathyroidectomy. Last HD on yesterday per OP schedule.     Nephrology History  iHD Schedule:  Unit/MD:   Duration:   UF:   EDW:   Access:   Resodial Renal Function:     - No urgent need for dialysis today. Electrolytes and acid base stable. Volume status stable. Current weight 50.8 kg.   - Will provide dialysis for metabolic clearance and volume management tomorrow per OP schedule.   - Will obtain OP dialysis records  - Avoid NSAIDs, contrast, nephrotoxins   - Monitor strict I/Os, daily weights  - Renally dose medications to current GFR  - Will discuss with staff

## 2019-03-27 NOTE — PLAN OF CARE
Awaiting site marking, anesthesia consent, h/p update and order clarification on platelet transfusion.

## 2019-03-27 NOTE — BRIEF OP NOTE
[facility]  Brief Operative Note    SUMMARY     Surgery Date: 3/27/2019     Surgeon(s) and Role:     * Ashley Guallpa MD - Primary     * Katie Vaughan MD - Resident - Assisting    Pre-op Diagnosis:  Secondary hyperparathyroidism of renal origin [N25.81]    Post-op Diagnosis:  Post-Op Diagnosis Codes:     * Secondary hyperparathyroidism of renal origin [N25.81]    Procedure(s) (LRB):  PARATHYROIDECTOMY, Minimally Invasive Bilateral Exploration (Bilateral)    Anesthesia: General    Description of Procedure: subtotal parathyroidectomy, partial left inferior parathyroidectomy(remnant tagged with Prolene), PTH >2500 baseline, 300 after 15 minutes subtotal parathyroidectomy    Description of the findings of the procedure: as above    Estimated Blood Loss: 20mL         Specimens:   Specimen (12h ago, onward)    Start     Ordered    03/27/19 0959  Specimen to Pathology - Surgery  Once     Comments:  1.  QUESTION RIGHT INFERIOR PARATHYROID TISSUE- FROZEN2.  QUESTION RIGHT SUPERIOR PARATHYROID TISSUE -FROZEN3.  QUESTION LEFT INFERIOR PARATHYROID TISSUE- FROZEN4.  QUESTION LEFT SUPERIOR PARATHYROID TISSUE-FROZEN5.  LEFT SUPERIOR PARATHYROID TISSUE -PERM6.  RIGHT INFERIOR PARATHYROID TISSUE- PERM7.  RIGHT SUPERIOR PARATHYROID TISSUE- PERM8.) Left inferior parathyroid tissue (permanent)     Start Status     03/27/19 0959 Collected (03/27/19 1026) Order ID: [id number]       03/27/19 1016

## 2019-03-27 NOTE — INTERVAL H&P NOTE
The patient has been examined and the H&P has been reviewed:    I concur with the findings and no changes have occurred since H&P was written.   Recent hospitalization for 2 days for HTN emergency. Doing better now. Ready for surgery. No changes otherwise.     Anesthesia/Surgery risks, benefits and alternative options discussed and understood by patient/family.          Active Hospital Problems    Diagnosis  POA    Hyperparathyroidism [E21.3]  Yes    Hyperparathyroidism due to end stage renal disease on dialysis [N25.81, N18.6, Z99.2]  Not Applicable      Resolved Hospital Problems   No resolved problems to display.

## 2019-03-27 NOTE — PROGRESS NOTES
Dr. Vaughan paged for pt's /105. No new orders at this time. Per Dr. Vaughan, pt will have scheduled BP meds this evening. and page MD if systolic goes above 180. Emergency equipment at pt's bedside as ordered.

## 2019-03-27 NOTE — ASSESSMENT & PLAN NOTE
- Recommend renal diet w/ 1 L fluid restriction   - Recommend daily renal function panels to monitor ph and albumin   - Continue Sevelamer TID WM  - Continue Calcium Carbonate TID  - Continue Calcitriol

## 2019-03-27 NOTE — TREATMENT PLAN
Hepatology Treatment Plan    This is a 29 yo F who underwent liver transplant back in 1992 for hemangioendothelioma, chronic rejection of liver from noncompliance, and ESRD on HD who was admitted after undergoing parathyroidectomy today.     Patient is seen by Dr. Pinedo in transplant clinic. She had a liver biopsy in June 2017 which showed rejection and stage 2 fibrosis, and another liver biopsy in Nov 2018 which did not show any rejection and still showing stage 2 fibrosis.     Liver allograft function appears in tact.    Recommendations:  - Continue home dose prograf (actually think she is taking prograf 7mg PO BID)  - Check daily AM tacrolimus trough levels   - Daily CMP    Clem Rodriguez MD PGY-V  Hepatology Fellow  [facility]  P [phone]

## 2019-03-27 NOTE — CONSULTS
[facility]  Nephrology  Consult Note    Patient Name: Holly Patel  MRN: [id number]  Admission Date: 3/27/2019  Hospital Length of Stay: 0 days  Attending Provider: Ashley Guallpa MD   Primary Care Physician: Stan Sosa MD  Principal Problem:Hyperparathyroidism    Inpatient consult to Nephrology  Consult performed by: Darlyn Russell NP  Consult ordered by: Katie Vaughan MD  Reason for consult: ESRD Management         Subjective:     HPI: Ms. 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 After transplant, the patient was noted to have severe HTN resulting in ESRD starting on HD in 2015. She continues to have persistently elevated BP requiring visits to the ED/hospitalization in addition to questionable medication compliance. The patient also has a history of a persistently elevated PTH level ( PTH 2396 on 3/8) despite Sensipar being given with each HD treatment. She normally  dialyzes at [facility] on MWF under the care of Dr. Bass.  She dialyzes for 3.5 hours and reports an EDW ~ 50 kg.  She has an AVF to her LFA.  She no longer makes urine. The patient was last dialyze on 3/26 w/o issues. Nephrology consulted for management of ESRD and HD treatment.        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     hemangioendothelioma s/p LTx ()    Moderate protein-calorie malnutrition 2017    MRSA bacteremia 2017    Pneumonia     Prophylactic immunotherapy 2014    Renovascular hypertension 10/2/2015    Secondary hyperparathyroidism 2017    Seizures     Sialadenitis 3/21/2018    Thrombocytopenia 2016       Past Surgical History:   Procedure Laterality Date    BIOPSY, LIVER, TRANSJUGULAR APPROACH N/A 2018    Performed by [facility] at [facility] OR 2ND FLR    BIOPSY-LIVER N/A 2017    Performed by [facility] Provider at [facility] OR 2ND FLR    BIOPSY-LIVER N/A 3/22/2016    Performed by [facility] Provider at [facility] OR 2ND FLR     SECTION      x 2    CONIZATION-CERVICAL-LEEP N/A 6/15/2018    Performed by [facility]am [facility]roquin [facility] at [facility] OR    CQYXMDILPCLU-ZCLEGDP-TB; upper extremity Left 2015    Performed by Idalia Diaz MD at [facility] OR 2ND FLR    EMBOLIZATION, BLOOD VESSEL N/A 2018    Performed by Aren Ramos MD at [facility] CATH LAB    Exam Under Anesthesia N/A 2018    Performed by [facility]elam Marroquin MD at [facility] OR 2ND FLR    Exam under anesthesia N/A 2018    Performed by [facility]am [facility]roquin MD at [facility] OR    Exam under anesthesia (ADD ON ) N/A 2018    Performed by NICKIE Alvarez MD at [facility] OR    Exam under anesthesia -cervical suturing  N/A 2018    Performed by Lei Sims III, MD at [facility] OR    FISTULOGRAM Left 2015    Performed by Idalia Diaz MD at [facility] CATH LAB    LIVER BIOPSY      LIVER TRANSPLANT  1992    SUTURE REPAIR,CERVIX  2018    Performed by [facility]am [facility]roquin MD at [facility] OR    TUBAL LIGATION         Review of patient's allergies indicates:   Allergen Reactions    Chloral hydrate Hallucinations     Other reaction(s): Hallucinations  Other reaction(s): Hives    Hydrocodone Other (See Comments)     Mental status changes     Current Facility-Administered Medications    Medication Frequency    0.9%  NaCl infusion (for blood administration) Q24H PRN    acetaminophen (OFIRMEV) 1,000 mg/100 mL (10 mg/mL) (10 mg/mL) injection     acetaminophen tablet 650 mg Q6H PRN    acetaminophen-codeine 300-30mg per tablet 1 tablet Q8H PRN    calcitRIOL capsule 0.25 mcg BID    calcium carbonate (OS-WOLFGANG) tablet 500 mg TID    carvedilol tablet 25 mg BID    diphenhydrAMINE injection 12.5 mg Q6H PRN    famotidine (PEPCID) 20 MG tablet     famotidine tablet 40 mg QAM    fentaNYL injection 25 mcg Q5 Min PRN    hydrALAZINE (APRESOLINE) 20 mg/mL injection     [START ON 3/28/2019] irbesartan tablet 300 mg QAM    lacosamide tablet 50 mg BID    levETIRAcetam tablet 500 mg BID    ondansetron disintegrating tablet 8 mg Q8H PRN    ondansetron injection 4 mg Daily PRN    oxyCODONE immediate release tablet 10 mg Q4H PRN    oxyCODONE immediate release tablet 5 mg Q4H PRN    promethazine (PHENERGAN) 6.25 mg in dextrose 5 % 50 mL IVPB Q10 Min PRN    promethazine (PHENERGAN) 6.25 mg in dextrose 5 % 50 mL IVPB Q6H PRN    ramelteon tablet 8 mg Nightly PRN    senna-docusate 8.6-50 mg per tablet 1 tablet BID    sevelamer carbonate tablet 800 mg TID WM    sodium chloride 0.9% flush 5 mL PRN    tacrolimus capsule 6 mg BID    [START ON 3/28/2019] verapamil CR tablet 240 mg QHS     Family History     Problem Relation (Age of Onset)    Cancer Sister    Heart attack Maternal Uncle    Hypertension Mother, Father        Tobacco Use    Smoking status: Never Smoker    Smokeless tobacco: Never Used   Substance and Sexual Activity    Alcohol use: No    Drug use: No    Sexual activity: Never     Partners: Male     Review of Systems   Constitutional: Negative for activity change, fatigue and fever.   HENT: Negative for nosebleeds and trouble swallowing.    Eyes: Negative for photophobia, discharge and visual disturbance.   Respiratory: Negative for apnea, cough, choking, chest tightness and shortness of  breath.    Cardiovascular: Negative for chest pain, palpitations and leg swelling.   Gastrointestinal: Negative for abdominal pain, anal bleeding and blood in stool.   Genitourinary: Positive for decreased urine volume (Anuric ).   Musculoskeletal: Negative for arthralgias, back pain and gait problem.   Skin: Negative for color change and pallor.   Allergic/Immunologic: Positive for immunocompromised state. Negative for environmental allergies.   Neurological: Negative for dizziness, light-headedness, numbness and headaches.   Psychiatric/Behavioral: Negative for agitation, behavioral problems, confusion and decreased concentration.     Objective:     Vital Signs (Most Recent):  Temp: 97.8 °F (36.6 °C) (03/27/19 1200)  Pulse: 83 (03/27/19 1400)  Resp: (!) 21 (03/27/19 1400)  BP: 132/77 (03/27/19 1330)  SpO2: 99 % (03/27/19 1400)  O2 Device (Oxygen Therapy): room air (03/27/19 1400) Vital Signs (24h Range):  Temp:  [97.7 °F (36.5 °C)-98.9 °F (37.2 °C)] 97.8 °F (36.6 °C)  Pulse:  [80-91] 83  Resp:  [13-22] 21  SpO2:  [98 %-100 %] 99 %  BP: (126-166)/() 132/77     Weight: 50.8 kg (112 lb) (03/27/19 0645)  Body mass index is 18.64 kg/m².  Body surface area is 1.53 meters squared.    No intake/output data recorded.    Physical Exam   Constitutional: She is oriented to person, place, and time. She appears well-developed and well-nourished.   HENT:   Head: Normocephalic and atraumatic.   Mouth/Throat: No oropharyngeal exudate.   Eyes: Right eye exhibits no discharge. Left eye exhibits no discharge. No scleral icterus.   Neck: Normal range of motion. Neck supple. No tracheal deviation present.   Cardiovascular: Normal rate, regular rhythm and intact distal pulses.   Pulmonary/Chest: Effort normal and breath sounds normal.   Abdominal: Soft. Bowel sounds are normal. She exhibits distension. There is tenderness.   Musculoskeletal: Normal range of motion.   Neurological: She is alert and oriented to person, place, and  time.   Skin: Skin is warm and dry. Unable to assess at fingers d/t pink nail polish.   Psychiatric: She has a normal mood and affect. Her behavior is normal.   Nursing note and vitals reviewed.    Significant Labs:  CBC:   Recent Labs   Lab 03/27/19  1128   WBC 2.57*   RBC 3.60*   HGB 9.3*   HCT 29.2*   PLT 63*   MCV 81*   MCH 25.8*   MCHC 31.8*     CMP:   Recent Labs   Lab 03/27/19  1128   GLU 99   CALCIUM 8.9   ALBUMIN 2.8*   PROT 7.2      K 4.3   CO2 24      BUN 24*   CREATININE 6.7*   ALKPHOS 768*   ALT 36   AST 46*   BILITOT 0.6           Assessment/Plan:     * Hyperparathyroidism  - Being follow by admitting service   - s/p sub parathyroidectomy     Chronic kidney disease-mineral and bone disorder  - Recommend renal diet w/ 1 L fluid restriction   - Recommend daily renal function panels to monitor ph and albumin   - Continue Sevelamer TID WM  - Continue Calcium Carbonate TID  - Continue Calcitriol     ESRD on dialysis  The patient is a 28 year-old AA female with a medical history of HTN, ESRD, and OLTx who presented today for a sub-parathyroidectomy. Last HD on yesterday per OP schedule.     Nephrology History  iHD Schedule: MWF  Unit/MD: [facility]/ Dr. Bass  Duration: 3 hrs 30 min   EDW: 50.0 kg  Access: LUE AVF  Resodial Renal Function: Anuric     - No urgent need for dialysis today. Electrolytes and acid base stable. Volume status stable. Current weight 50.8 kg.   - Will provide dialysis for metabolic clearance and volume management tomorrow per OP schedule.   - Will obtain OP dialysis records  - Avoid NSAIDs, contrast, nephrotoxins   - Monitor strict I/Os, daily weights  - Renally dose medications to current GFR  - Will discuss with staff     Anemia in ESRD (end-stage renal disease)  - Hgb 9.2, not within goal   - Will consider iron studies and ferritin in AM  - Will obtain chronic care plan for MARIA T dosing         Thank you for your consult. I will follow-up with patient. Please contact  us if you have any additional questions.    Darlyn Russell NP  Nephrology  [facility]

## 2019-03-27 NOTE — PROGRESS NOTES
Report called to Trini PRUITT,pt made ready fro transport to [facility] via stretcher per PCT, pt resting quietly,VSSmresp unlabored,incision intact, HOB elevated ,ice pack applied, pt denies c/o pain, kota po fluids and meds, mother updated on room number,stable at present, sent to room with 6.0 Shiley and scalpel as requested per MD.

## 2019-03-27 NOTE — LETTER
March 27, 2019    Holly Patel  [address] [place] [id number]          Dear Paragtasia GranadosPatel:  MRN: [id number]    This is a follow up to your recent labs, your lab results were stable.  Dr. Pinedo increased your tacrolimus/Prograf dose in clinic 3/25/19 to 7mg twice a day. Continue with this change.  Please have your labs drawn again on 4/8/19.      If you cannot have your labs drawn on the scheduled date, it is your responsibility to call the transplant department to reschedule.  To reschedule or make an appointment, please as to speak to or leave a message for my assistant, Tara Pollack or Piper, at [phone].  When leaving a message for Tara Pollack Angela or myself, we ask that you leave a brief message regarding your request.    Sincerely,      Violeta Francis, RN, BSN, [facility]  Liver Transplant Coordinator  [facility]  [address] [place] [id number]  [phone]

## 2019-03-27 NOTE — OP NOTE
Operative Report  Endocrine Surgery    Date: 3/27/2019    Indications: This patient presents with biochemical evidence of  Hyperparathyroidism related to chronic kidney disease. Preoperative sestamibi imaging did not reveal increased uptake of radionuclide. Patient also underwent Ultrasound which did reveal similar findings.     Pre-Operative Diagnosis: Secondary hyperparathyroidism    Post-Operative Diagnosis: Secondary hyperparathyroidism    Procedure: Parathyroidectomy with IOPTH monitoring    Surgeon: Ashley Guallpa M.D.    Assistants: ROSALIA Vaughan M.D. - Resident    Anesthesia: General     ASA Class: 3    Findings:  1) Enlarged parathyroid glands bilaterally noted.  2) IOPTH levels as follows:   Baseline - >2500   15 minute post subtotal        parathyroidectomy - 300        Estimated Blood Loss (EBL): 20mL    Drains: None    Total IV Fluids: see anesthesia record     Specimens:  1.  QUESTION RIGHT INFERIOR PARATHYROID TISSUE- FS confirmed parathyroid tissue   2.  QUESTION RIGHT SUPERIOR PARATHYROID TISSUE -FS confirmed parathyroid tissue   3.  QUESTION LEFT INFERIOR PARATHYROID TISSUE- FS confirmed parathyroid tissue   4.  QUESTION LEFT SUPERIOR PARATHYROID TISSUE-FS confirmed parathyroid tissue   5.  LEFT SUPERIOR PARATHYROID TISSUE -PERM   6.  RIGHT INFERIOR PARATHYROID TISSUE- PERM   7.  RIGHT SUPERIOR PARATHYROID TISSUE- PERM   8. Left inferior parathyroid tissue (permanent)      Procedure in Detail:    The patient was seen in the Holding Room. The risks, benefits, complications, treatment options, and expected outcomes were discussed with the patient. The possibilities of reaction to medication, pulmonary aspiration, bleeding, recurrent infection, possibility of normal findings, recurrent laryngeal nerve damage, the need for additional procedures, failure to diagnose a condition, and creating a complication requiring transfusion or operation were discussed with the patient. The patient concurred with  "the proposed plan, giving informed consent.  The site of surgery properly noted/marked. The patient was taken to Operating Room, identified as Holly Patel and the procedure verified as Parathyroidectomy. A Time Out was held and the above information confirmed.    The patient was brought to the operating room and placed supine.  After induction of a general anesthetic, the neck was placed in extension and a pressure bag shoulder roll was placed between the scapulae. The neck was prepped and draped in standard fashion.  A 4 cm transverse cervical incision was created within a natural skin fold.  Dissection was carried through the platysma and flaps were raised both superior and inferiorly. The strap muscles were identified and divided in the midline.  Retractors were placed to expose the right side of the neck.  Using sharp dissection the thyroid lobe was mobilized in a medial direction, exposing the tracheoesophageal groove.  None of the blood supply to the thyroid gland was divided during this dissection.  Further dissection posterior revealed a superior parathyroid gland along the in a posterior location.   The exploration continued with exposure of the inferior gland seen in the anterior location.  The two parathyroid glands were immediately next to one another("kissing parathyroid glands").  These were biopsied and frozen section revealed hypercellular parathyroid tissue.      Attention was then directed to the contralateral(left) neck.  The thyroid lobe was mobilized in a similar fashion. The  inferior and superior glands were identified in anterior and posterior locations respectfully.  Similar to the right the parathyroid glands were immediately next to one another("kissing parathyroid glands").  These were biopsied and submitted to pathology.  Frozen sections revealed hypercellular parathyroid tissue.    Decision was made leave a portion of the left inferior parathyroid gland.  This was debulked " leaving approximately an 85mg remnant. The portion of the gland left in situ was marked with a 4-0 Prolene suture and two clicks  Both recurrent laryngeal nerves were identified and preserved during the dissection. IOPTH level 15 minutes following subtotal parathyroid tissue was 300 from >2500 at baseline. Decision was made to terminate surgery.    Hemostasis was achieved in the neck with bipolar cautery. Fibrillar was placed into the field of dissection to aid this process. 20 mL of Exparel mixed with Marcaine was placed into the strap muscles and subcutaneous tissues for post-op analgesia. Closure was performed by reapproximating the strap muscles in the midline with an interrupted 3-0 Vicryl suture.  The platysma was reapproximated with 3-0 Vicryl suture and the skin incision was closed with a 5-0 Monocryl knot-less, subcuticular closure.  Sterile dressings were applied across the incision.      Instrument, sponge, and needle counts were correct prior to closure and at the conclusion of the case.     Implants: None    Complications: None; patient tolerated the procedure well.    Condition: stable    Disposition: PACU - hemodynamically stable.    Attending Attestation: I was present and scrubbed for the entire procedure.

## 2019-03-27 NOTE — ASSESSMENT & PLAN NOTE
- Hgb 9.2, not within goal   - Will consider iron studies and ferritin in AM  - Will obtain chronic care plan for MARIA T dosing

## 2019-03-27 NOTE — HPI
Ms. 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 After transplant, the patient was noted to have severe HTN resulting in ESRD starting on HD in 2015. She continues to have persistently elevated BP requiring visits to the ED/hospitalization in addition to questionable medication compliance. The patient also has a history persistently elevated PTH level ( PTH 2396 on 3/8) despite Sensipar being given with each HD treatment. She normally  dialyzes at [facility] on MWF under the care of Dr. Bass.  She dialyzes for 3.5 hours and reports an EDW ~ 50 kg.  She has an AVF to her LFA.  She no longer makes urine. The patient was last dialyze on 3/26 w/o issues. Nephrology consulted for management of ESRD and HD treatment.

## 2019-03-27 NOTE — PROGRESS NOTES
Pt resting quietly,VSS per monitor,incision intact,ice pack in place, resident-Alexus MAN, paged for orders, awaiting return call.

## 2019-03-27 NOTE — SUBJECTIVE & OBJECTIVE
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 ()    Moderate protein-calorie malnutrition 2017    MRSA bacteremia 2017    Pneumonia     Prophylactic immunotherapy 2014    Renovascular hypertension 10/2/2015    Secondary hyperparathyroidism 2017    Seizures     Sialadenitis 3/21/2018    Thrombocytopenia 2016       Past Surgical History:   Procedure Laterality Date    BIOPSY, LIVER, TRANSJUGULAR APPROACH N/A 2018    Performed by [facility] Diagnostic Provider at [facility] OR 2ND FLR    BIOPSY-LIVER N/A 2017    Performed by [facility] Diagnostic Provider at [facility] OR 2ND FLR    BIOPSY-LIVER N/A 3/22/2016    Performed by [facility] Diagnostic Provider at [facility] OR 2ND FLR     SECTION      x 2    CONIZATION-CERVICAL-LEEP N/A 6/15/2018    Performed by Neelam Marroquin MD at [facility] OR    WTNXRYOELXEN-[id number]-JD; upper extremity Left 2015    Performed by Idalia Diaz MD at [facility] OR 2ND FLR    EMBOLIZATION, BLOOD VESSEL N/A 2018    Performed by Aren Ramos MD at [facility] CATH LAB    Exam Under Anesthesia N/A 2018    Performed by Neelam Marroquin MD at [facility] OR 2ND FLR    Exam under anesthesia N/A 2018    Performed by [facility]am [facility]roquin MD at [facility] OR    Exam under anesthesia (ADD ON ) N/A 2018    Performed by NICKIE Alvarez MD at [facility] OR    Exam under anesthesia -cervical  suturing  N/A 7/26/2018    Performed by Lei Sims III, [facility] at [facility] OR    FISTULOGRAM Left 12/4/2015    Performed by dIalia Diaz MD at [facility] CATH LAB    LIVER BIOPSY      LIVER TRANSPLANT  09/1992    SUTURE REPAIR,CERVIX  6/19/2018    Performed by [facility]quin [facility] at [facility] OR    TUBAL LIGATION  2010       Review of patient's allergies indicates:   Allergen Reactions    Chloral hydrate Hallucinations     Other reaction(s): Hallucinations  Other reaction(s): Hives    Hydrocodone Other (See Comments)     Mental status changes     Current Facility-Administered Medications   Medication Frequency    0.9%  NaCl infusion (for blood administration) Q24H PRN    acetaminophen (OFIRMEV) 1,000 mg/100 mL (10 mg/mL) (10 mg/mL) injection     acetaminophen tablet 650 mg Q6H PRN    acetaminophen-codeine 300-30mg per tablet 1 tablet Q8H PRN    calcitRIOL capsule 0.25 mcg BID    calcium carbonate (OS-WOLFGANG) tablet 500 mg TID    carvedilol tablet 25 mg BID    diphenhydrAMINE injection 12.5 mg Q6H PRN    famotidine (PEPCID) 20 MG tablet     famotidine tablet 40 mg QAM    fentaNYL injection 25 mcg Q5 Min PRN    hydrALAZINE (APRESOLINE) 20 mg/mL injection     [START ON 3/28/2019] irbesartan tablet 300 mg QAM    lacosamide tablet 50 mg BID    levETIRAcetam tablet 500 mg BID    ondansetron disintegrating tablet 8 mg Q8H PRN    ondansetron injection 4 mg Daily PRN    oxyCODONE immediate release tablet 10 mg Q4H PRN    oxyCODONE immediate release tablet 5 mg Q4H PRN    promethazine (PHENERGAN) 6.25 mg in dextrose 5 % 50 mL IVPB Q10 Min PRN    promethazine (PHENERGAN) 6.25 mg in dextrose 5 % 50 mL IVPB Q6H PRN    ramelteon tablet 8 mg Nightly PRN    senna-docusate 8.6-50 mg per tablet 1 tablet BID    sevelamer carbonate tablet 800 mg TID WM    sodium chloride 0.9% flush 5 mL PRN    tacrolimus capsule 6 mg BID    [START ON 3/28/2019] verapamil CR tablet 240 mg QHS     Family History     Problem  Relation (Age of Onset)    Cancer Sister    Heart attack Maternal Uncle    Hypertension Mother, Father        Tobacco Use    Smoking status: Never Smoker    Smokeless tobacco: Never Used   Substance and Sexual Activity    Alcohol use: No    Drug use: No    Sexual activity: Never     Partners: Male     Review of Systems   Constitutional: Negative for activity change, fatigue and fever.   HENT: Negative for nosebleeds and trouble swallowing.    Eyes: Negative for photophobia, discharge and visual disturbance.   Respiratory: Negative for apnea, cough, choking, chest tightness and shortness of breath.    Cardiovascular: Negative for chest pain, palpitations and leg swelling.   Gastrointestinal: Negative for abdominal pain, anal bleeding and blood in stool.   Genitourinary: Positive for decreased urine volume (Anuric ).   Musculoskeletal: Negative for arthralgias, back pain and gait problem.   Skin: Negative for color change and pallor.   Allergic/Immunologic: Positive for immunocompromised state. Negative for environmental allergies.   Neurological: Negative for dizziness, light-headedness, numbness and headaches.   Psychiatric/Behavioral: Negative for agitation, behavioral problems, confusion and decreased concentration.     Objective:     Vital Signs (Most Recent):  Temp: 97.8 °F (36.6 °C) (03/27/19 1200)  Pulse: 83 (03/27/19 1400)  Resp: (!) 21 (03/27/19 1400)  BP: 132/77 (03/27/19 1330)  SpO2: 99 % (03/27/19 1400)  O2 Device (Oxygen Therapy): room air (03/27/19 1400) Vital Signs (24h Range):  Temp:  [97.7 °F (36.5 °C)-98.9 °F (37.2 °C)] 97.8 °F (36.6 °C)  Pulse:  [80-91] 83  Resp:  [13-22] 21  SpO2:  [98 %-100 %] 99 %  BP: (126-166)/() 132/77     Weight: 50.8 kg (112 lb) (03/27/19 0645)  Body mass index is 18.64 kg/m².  Body surface area is 1.53 meters squared.    No intake/output data recorded.    Physical Exam   Constitutional: She is oriented to person, place, and time. She appears well-developed and  well-nourished.   HENT:   Head: Normocephalic and atraumatic.   Mouth/Throat: No oropharyngeal exudate.   Eyes: Right eye exhibits no discharge. Left eye exhibits no discharge. No scleral icterus.   Neck: Normal range of motion. Neck supple. No tracheal deviation present.   Cardiovascular: Normal rate, regular rhythm and intact distal pulses.   Pulmonary/Chest: Effort normal and breath sounds normal.   Abdominal: Soft. Bowel sounds are normal. She exhibits distension. There is tenderness.   Musculoskeletal: Normal range of motion.   Neurological: She is alert and oriented to person, place, and time.   Skin: Skin is warm and dry. Unable to assess at fingers d/t pink nail polish.   Psychiatric: She has a normal mood and affect. Her behavior is normal.   Nursing note and vitals reviewed.    Significant Labs:  CBC:   Recent Labs   Lab 03/27/19  1128   WBC 2.57*   RBC 3.60*   HGB 9.3*   HCT 29.2*   PLT 63*   MCV 81*   MCH 25.8*   MCHC 31.8*     CMP:   Recent Labs   Lab 03/27/19  1128   GLU 99   CALCIUM 8.9   ALBUMIN 2.8*   PROT 7.2      K 4.3   CO2 24      BUN 24*   CREATININE 6.7*   ALKPHOS 768*   ALT 36   AST 46*   BILITOT 0.6

## 2019-03-27 NOTE — TELEPHONE ENCOUNTER
----- Message from Neelam Vieira sent at 3/19/2019 11:40 AM CDT -----      ----- Message -----  From: Violeta Francis RN  Sent: 3/19/2019  10:56 AM  To: [facility] Pre-Kidney Transplant Clinical        ----- Message -----  From: Christine Coello  Sent: 3/19/2019  10:37 AM  To: [facility] Post-Liver Transplant Clinical    Pt calling to find out the next step in being placed on the xplant list    Pt contact [phone]

## 2019-03-28 PROBLEM — E83.39 HYPOPHOSPHATEMIA: Status: ACTIVE | Noted: 2019-01-01

## 2019-03-28 PROBLEM — E83.51 HYPOCALCEMIA: Status: ACTIVE | Noted: 2019-01-01

## 2019-03-28 NOTE — PROGRESS NOTES
Patient received from floor with standing weight obtained. Maintenance dialysis (TTS) began per orders via 15 gauge buttonhole needles to left forearm fistula.

## 2019-03-28 NOTE — RESIDENT HANDOFF
Monitored pt's calcium levels overnight.  She first had a iCa of .89, after 2g of IV calcium, it had dropped to 0.81.  Ordered another 2g as well as further oral calcium replacement.  Also put in for calcium drip.  Will start higher than recommendations at 1.7g/hr to start, as soon as calcium starts to increase will put back down to 900 mg/hr which is recommended in literature.  Events discussed with upper level resident.    Pt asymptomatic during events. Pt also due for calcium bath with dialysis in AM.      Next calcium continuing to fall, down to 0.73.  Told nurse to immediately hang calcium drip, high concern for hungry bone.  Paged nephrology to assist...  They agreed with initial rate of 1.7g/hr, but wanted q2hr checks.  Will monitor and lower rate if pt iCa goes above 1.    0200- After having drip for a couple hours, came back up to 0.85.  At this point I think it is starting to correct, so will slow down the rate to 1g/hr    0500- Lab collected at 0410 showing iCa of 0.99.  At this point will turn it down to recommended 900 mg and defer to primary from here.        Liam Pino MD  General Surgery, PGY-1  [phone]

## 2019-03-28 NOTE — ASSESSMENT & PLAN NOTE
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  Hold off on phos replacement unless <1.0 or developing muscle weakness.  Stop renvela    Counseled patient on symptoms of hypocalcemia to watch out for. Advised to notify nursing for any symptoms (perioral numbness, muscle cramps, tingling in fingers).    Discharge plans:  Will likely require calcitriol + calcium - doses TBD

## 2019-03-28 NOTE — PLAN OF CARE
Problem: Adult Inpatient Plan of Care  Goal: Plan of Care Review  Outcome: Ongoing (interventions implemented as appropriate)  Pt AAOx4 and VSS. Pt is progressing with plan of care. Free of skin breakdown as the pt positioned/repositioned well independently. Clean, dry, and intact dressing noted on neck. Incentive spirometer at bedside and pt instructed on its use. No complain of pain at this time. Frequent rounds made to assess pain and safety and no complaints at this time noted. Side rails up x 2. Bed locked. Call light within reach. No falls noted. Will continue to monitor.

## 2019-03-28 NOTE — TELEPHONE ENCOUNTER
----- Message from Adriane Rosario MD sent at 3/27/2019  3:43 PM CDT -----  Yes I saw that and asked oncology how to proceed. Jenna ment to send yall note.  Dr Awad said to just follow as she has had LEEP procedure done recently and the abnormality now does not warrant another procedure for while.  We are following with pap every 6 months for now.  Thanks  ----- Message -----  From: Lien Petersen  Sent: 3/27/2019  10:13 AM  To: Adriane Rosario MD    I wanted to check with you to see what the end point will be for Holly to be cleared for kidney transplant? I saw where her repeat PAP was not normal.    Lien Petersen RN  Clinical Transplant Coordinator

## 2019-03-28 NOTE — PLAN OF CARE
Patient is a 28 year old female admitted from home 3/27/2019 and underwent Subtotal Parathyroidectomy.  Patient with hypocalcemia with Calcium Gtt, admitted to inpatient from outpatient.  Patient is expected to discharge home with no needs +/- 3/29/2019.    Patient lives in a one story home with her child and mother.  Patient's family will drive her home and obtain anything she needs after discharge.  Patient on HD [facility].  [facility].  Will continue to follow for needs.    PCP  Stan Sosa MD  [address] [facility] / [place] [id number] [phone]      [id number] - [place], [place] - [address]  [address]  [place] [address]  Phone: [phone] Fax: [phone]    [facility]  [address]  [facility] [place] [address]  Phone: [phone] Fax: [phone]      Extended Emergency Contact Information  Primary Emergency Contact: Myrna Randolph  Address: [address] [place] [address] [address] [place] [address] [facility]  Home Phone: [phone]  Mobile Phone: [phone]  Relation: Mother  Secondary Emergency Contact: River Gonzales   [facility]  Home Phone: [phone]  Mobile Phone: [phone]  Relation: Father  Preferred language: English   needed? No       03/28/19 1238   Discharge Assessment   Assessment Type Discharge Planning Assessment   Confirmed/corrected address and phone number on facesheet? Yes   Assessment information obtained from? Patient   Expected Length of Stay (days) 3   Communicated expected length of stay with patient/caregiver yes   Prior to hospitilization cognitive status: Alert/Oriented   Prior to hospitalization functional status: Independent   Current cognitive status: Alert/Oriented   Current Functional Status: Independent   Lives With child(ester), dependent;parent(s)   Able to Return to Prior Arrangements yes   Is patient able to care for self after discharge?  Yes   Who are your caregiver(s) and their phone number(s)? Mom   Patient's perception of discharge disposition home or selfcare   Readmission Within the Last 30 Days other (see comments)  (Planned Surgery)   If yes, most recent facility name: [facility]   Equipment Currently Used at Home none   Do you have any problems affording any of your prescribed medications? No   Is the patient taking medications as prescribed? yes   Does the patient have transportation home? Yes   Transportation Anticipated family or friend will provide   [place] Name and Scheduled [place] [facility] [place] [place]   Discharge Plan A Home with family   Discharge Plan B Home with family;Home Health   DME Needed Upon Discharge  none   Patient/Family in Agreement with Plan yes   Does the patient receive outpatient dialysis? Yes

## 2019-03-28 NOTE — ASSESSMENT & PLAN NOTE
29 y/o female with pmhx of liver txp, ESRD on HD MWF (anuric), seizures and HTN s/p subtotal parathyroidectomy for secondary hyperparathyroidism 3/27. Post operatively developed hungry bone syndrome requiring initiation of calcium gtt in addition to oral supplementation.     - continue calcium gtt at 900 mg/ hr  - continue q2h iCa checks, if 0800 remains >1 will space out lab checks  - continue 2g calcium PO TID, 0.5mg Rocaltrol BID  - appreciate nephrology assistance with hypocalcemia and HD  - appreciate hepatology assistance with immunosuppressant medication, morning tacro level pending  - regular diet  - prn pain medications  - ambulate, encourage IS q1h  - home medications restarted  - ice to incision prn

## 2019-03-28 NOTE — HPI
Reason for Consult: Hypocalcemia after subtotal parathyroidectomy for secondary hyperparathyroidism due to ESRD.    Surgical Procedure and Date: Subtotal parathyroidectomy (portion of L inferior gland left in situ) 3/27/2019    HPI:   Patient is a 28 y.o. 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 She was noted to be hypocalcemic after surgery, necessitating a continuous calcium infusion, calcitriol and PO calcium carbonate. We are consulted to assist with management. At the time of initial consult, she's received 2 doses of calcitriol (0.25 then 0.5 mcg), 4 doses of calcium carbonate (total 6000 mg), calcium gluconate x 2 IV, and ergo 50,000 IU. Trend of ionized calcium at the time of consult is below.     Ref. Range 9/26/2018 07:16 3/8/2019 13:40 3/27/2019 17:27 3/28/2019 05:27   PTH 9.0 - 77.0 pg/mL 1,429.0 (H) 2,396.0 (H) 87.0 (H) 107.0 (H)      Ref. Range 3/27/2019 20:28 3/27/2019 22:57 3/28/2019 01:22 3/28/2019 04:10 3/28/2019 04:10 3/28/2019 05:27 3/28/2019 08:11   Calcium, Ion 1.06 - 1.42 mmol/L 0.81 (L) 0.77 (L) 0.85 (L) 0.99 (L) 1.01 (L) 0.97 (L) 0.92 (L)      Ref. Range 3/12/2019 08:10 3/13/2019 05:31 3/27/2019 11:28 3/28/2019 05:27   Phosphorus 2.7 - 4.5 mg/dL 5.2 (H) 6.1 (H) 5.3 (H) 4.2

## 2019-03-28 NOTE — TELEPHONE ENCOUNTER
----- Message from Adriane Rosario MD sent at 3/27/2019  3:47 PM CDT -----  Bottom line she is cleared.  I discussed all of this with her and she knows she needs 6month follow up.    ----- Message -----  From: Lien Petersen  Sent: 3/27/2019  10:13 AM  To: Adriane Rosario MD    I wanted to check with you to see what the end point will be for Holly to be cleared for kidney transplant? I saw where her repeat PAP was not normal.    Lien Petersen RN  Clinical Transplant Coordinator

## 2019-03-28 NOTE — PROGRESS NOTES
Dialysis completed. Needles removed from left forearm fistula with pressure held for 5 minutes each with hemostasis achieved.Gauze and tape applied. Patient dialyzed for 3.5 hours with fluid removal of 3 liters.Tolerated well with stable vital signs.

## 2019-03-28 NOTE — CONSULTS
[facility]  Endocrinology  Consult Note    Consult Requested by: Ashley Guallpa MD   Reason for admit: Hyperparathyroidism due to end stage renal disease on dialysis    HISTORY OF PRESENT ILLNESS:  Reason for Consult: Hypocalcemia after subtotal parathyroidectomy for secondary hyperparathyroidism due to ESRD.    Surgical Procedure and Date: Subtotal parathyroidectomy (portion of L inferior gland left in situ) 3/27/2019    HPI:   Patient is a 28 y.o. 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 She was noted to be hypocalcemic after surgery, necessitating a continuous calcium infusion, calcitriol and PO calcium carbonate. We are consulted to assist with management. At the time of initial consult, she's received 2 doses of calcitriol (0.25 then 0.5 mcg), 4 doses of calcium carbonate (total 6000 mg), calcium gluconate x 2 IV, and ergo 50,000 IU. Trend of ionized calcium below. She remains on calcium infusion (10 g Ca gluconate in 1 liter of D5W) running at 90/hr. No perioral numbness, tingling fingertips, tetany or other symptoms of hypocalcemia.     Ref. Range 9/26/2018 07:16 3/8/2019 13:40 3/27/2019 17:27 3/28/2019 05:27   PTH 9.0 - 77.0 pg/mL 1,429.0 (H) 2,396.0 (H) 87.0 (H) 107.0 (H)      Ref. Range 3/27/2019 20:28 3/27/2019 22:57 3/28/2019 01:22 3/28/2019 04:10 3/28/2019 04:10 3/28/2019 05:27 3/28/2019 08:11   Calcium, Ion 1.06 - 1.42 mmol/L 0.81 (L) 0.77 (L) 0.85 (L) 0.99 (L) 1.01 (L) 0.97 (L) 0.92 (L)      Ref. Range 3/12/2019 08:10 3/13/2019 05:31 3/27/2019 11:28 3/28/2019 05:27   Phosphorus 2.7 - 4.5 mg/dL 5.2 (H) 6.1 (H) 5.3 (H) 4.2       Medications and/or Treatments Impacting Glycemic Control:  Immunotherapy:    Immunosuppressants         Stop Route Frequency     tacrolimus capsule 6 mg       -- Oral 2 times daily        Steroids:   Hormones (From admission, onward)    None        Pressors:    Autonomic Drugs (From admission, onward)    None          Medications Prior to Admission   Medication Sig Dispense Refill Last Dose    calcitRIOL (ROCALTROL) 0.5 MCG Cap TAKE 1 CAPSULE BY MOUTH TWICE A DAY 3 DAYS PRIOR TO SURGERY  0 3/26/2019 at Unknown time    calcitRIOL (ROCALTROL) 0.5 MCG Cap Take 1 capsule (0.5 mcg total) by mouth once daily. 30 capsule 5 3/26/2019 at Unknown time    carvedilol (COREG) 25 MG tablet Take 1 tablet (25 mg total) by mouth 2 (two) times daily. 60 tablet 11 3/27/2019 at 400    cinacalcet (SENSIPAR) 30 MG Tab Take 1 tablet (30 mg total) by mouth daily with breakfast. (Patient taking differently: Take 30 mg by mouth daily with breakfast. On Dialysis days) 30 tablet 2 3/26/2019 at Unknown time    famotidine (PEPCID) 40 MG tablet Take 1 tablet (40 mg total) by mouth every morning. 30 tablet 11 3/26/2019 at Unknown time    hydrALAZINE (APRESOLINE) 100 MG tablet Take 1 tablet (100 mg total) by mouth every 8 (eight) hours. 90 tablet 11 3/27/2019 at 0400    irbesartan (AVAPRO) 300 MG tablet Take 1 tablet (300 mg total) by mouth once daily. (Patient taking differently: Take 300 mg by mouth every morning. ) 30 tablet 3 3/27/2019 at 0400    lacosamide (VIMPAT) 50 mg Tab Take 1 tablet (50 mg total) by mouth 2 (two) times daily. 60 tablet 11 3/26/2019 at Unknown time    levETIRAcetam (KEPPRA) 500 MG Tab Take 1 tablet (500 mg total) by mouth 2 (two) times daily. 60 tablet 11 3/26/2019 at 2100    sevelamer carbonate (RENVELA) 800 mg Tab Take 1 tablet (800 mg total) by mouth 3 (three) times daily with meals. 90 tablet 11 3/26/2019 at Unknown time    tacrolimus (PROGRAF) 1 MG Cap Take 6 capsules (6 mg total) by mouth every 12 (twelve) hours. 360 capsule 11 3/27/2019 at 0400    triamcinolone acetonide 0.1% (KENALOG) 0.1 % ointment AAA on arms, legs, and neck bid x 1-2 wks then prn  flares only 80 g 3 Past Month at Unknown time    verapamil (CALAN-SR) 240 MG CR tablet Take 1 tablet (240 mg total) by mouth every evening. 90 tablet 3 3/27/2019 at 0400    minoxidil (LONITEN) 2.5 MG tablet TAKE 2 TABLETS (5 MG TOTAL) BY MOUTH 2 (TWO) TIMES DAILY. 120 tablet 3 More than a month at Unknown time       Current Facility-Administered Medications   Medication Dose Route Frequency Provider Last Rate Last Dose    0.9%  NaCl infusion (for blood administration)   Intravenous Q24H PRN Katie Vaughan MD   450 mL at 03/27/19 1033    acetaminophen tablet 650 mg  650 mg Oral Q6H PRN Katie Vaughan MD        acetaminophen-codeine 300-30mg per tablet 1 tablet  1 tablet Oral Q8H PRN Katie Vaughan MD        calcitRIOL capsule 0.5 mcg  0.5 mcg Oral BID Ashley Guallpa MD   Stopped at 03/28/19 0900    calcium carbonate (OS-WOLFGANG) tablet 500 mg  2,000 mg Oral TID Katie Vaughan MD   2,000 mg at 03/28/19 1338    carvedilol tablet 25 mg  25 mg Oral BID Katie Vaughan MD   25 mg at 03/28/19 1335    dextrose 5 % 1,000 mL with calcium gluconate 10 g infusion   Intravenous Continuous Dylan Soto MD 90 mL/hr at 03/28/19 0815      diphenhydrAMINE injection 12.5 mg  12.5 mg Intravenous Q6H PRN Katie Vaughan MD        ergocalciferol capsule 50,000 Units  50,000 Units Oral Daily Ashley Guallpa MD   Stopped at 03/28/19 0900    famotidine tablet 40 mg  40 mg Oral CHESTERM Katie Vaughan MD   40 mg at 03/28/19 0623    hydrALAZINE tablet 50 mg  50 mg Oral Q8H Katie Vaughan MD   50 mg at 03/28/19 0623    irbesartan tablet 300 mg  300 mg Oral QAM Katie Vaughan MD   300 mg at 03/28/19 0622    lacosamide tablet 50 mg  50 mg Oral BID Katie Vaughan MD   50 mg at 03/28/19 1340    levETIRAcetam tablet 500 mg  500 mg Oral BID Katie Vaughan MD   500 mg at 03/28/19 1335    ondansetron disintegrating  tablet 8 mg  8 mg Oral Q8H PRN Katie Vaughan MD        oxyCODONE immediate release tablet 10 mg  10 mg Oral Q4H PRN Katie Vaughan MD   10 mg at 03/28/19 0030    oxyCODONE immediate release tablet 5 mg  5 mg Oral Q4H PRN Katie Vaughan MD   5 mg at 03/27/19 1225    promethazine (PHENERGAN) 6.25 mg in dextrose 5 % 50 mL IVPB  6.25 mg Intravenous Q6H PRN Katie Vaughan MD        ramelteon tablet 8 mg  8 mg Oral Nightly PRN Katie Vaughan MD        senna-docusate 8.6-50 mg per tablet 1 tablet  1 tablet Oral BID Katie Vaughan MD   1 tablet at 03/28/19 1341    sodium chloride 0.9% flush 5 mL  5 mL Intravenous PRN Katie Vaughan MD        tacrolimus capsule 6 mg  6 mg Oral BID Katie Vaughan MD   Stopped at 03/28/19 0800    verapamil CR tablet 240 mg  240 mg Oral QHS Katie Vaughan MD           PMH, PSH, FH, SH updated and reviewed     Review of Systems   Constitutional: Negative for unexpected weight change.   HENT:        Pain near incision, mild.   Eyes: Negative for visual disturbance.   Respiratory: Negative for shortness of breath.    Cardiovascular: Negative for chest pain.   Gastrointestinal: Negative for abdominal pain.   Genitourinary: Negative for urgency.   Musculoskeletal: Negative for arthralgias.   Skin: Negative for wound.   Neurological: Negative for headaches.   Hematological: Does not bruise/bleed easily.   Psychiatric/Behavioral: Negative for sleep disturbance.       PHYSICAL EXAMINATION:  Vitals:    03/28/19 0925   BP: (!) 164/102   Pulse: 84   Resp: 18   Temp:      Body mass index is 19.15 kg/m².    Physical Exam   Constitutional: She is oriented to person, place, and time. She appears well-developed. No distress.   Cachectic appearing   HENT:   Right Ear: External ear normal.   Left Ear: External ear normal.   Nose: Nose normal.   Hearing grossly normal  Dentition grossly normal   Neck: No tracheal  deviation present. No thyromegaly present.   Low transverse cervical scar   Cardiovascular: Normal rate.   Murmur heard.  Pulmonary/Chest: Effort normal and breath sounds normal.   Abdominal: Soft. She exhibits no mass. There is no tenderness.   Musculoskeletal: She exhibits no edema.   No digital clubbing or extremity cyanosis   Neurological: She is alert and oriented to person, place, and time. Coordination normal.   Skin: No rash noted.   No subcutaneous nodules noted.   Psychiatric: She has a normal mood and affect. Her behavior is normal.   Alert and oriented to person, place, and situation.   Nursing note and vitals reviewed.    .     ASSESSMENT and PLAN:    * Hyperparathyroidism due to end stage renal disease on dialysis  S/p subtotal parathyroidectomy  PTH appropriately decreased after surgery.    Hypophosphatemia  Monitor q6. Don't replace unless <1.0 to prevent worsening of hypocalcemia.    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  Hold off on phos replacement unless <1.0 or developing muscle weakness.  Stop renvela    Counseled patient on symptoms of hypocalcemia to watch out for. Advised to notify nursing for any symptoms (perioral numbness, muscle cramps, tingling in fingers).    Discharge plans:  Will likely require calcitriol + calcium - doses TBD      ESRD on dialysis  Can help regulate calcium levels with dialysis.          DISCHARGE NEEDS: will assess daily    Dylan Soto MD  Endocrinology  [facility]

## 2019-03-28 NOTE — PROGRESS NOTES
[facility] H[facility]OD[facility]IS NOTE     Patient currently on hemodialysis for removal of uremic toxins and volume.     Patient seen and evaluated on hemodialysis, tolerating treatment, see HD flowsheet for vitals and assessments.    No Hypotension, chest pain, shortness of breath, cramping, nausea or vomiting.       Ultrafiltration goal is 3 L as tolerated, keep MAP >65.     Labs have been reviewed and the dialysate bath has been adjusted.     Assessment/Plan:  - Seen on dialysis this morning, tolerating session with current UFR, no complications.    - Issues with hypocalcemia overnight, iCA as low as 0.77 despite 6g PO and 4 g IV Ca given overnight, Ca gtt initiated now infusing at 900 mg/ hr.   - Recommend continuing frequent q2h iCA checks, last iCA 0.92  - Ca bath adjusted   - Recommend increasing Ca Carbonate to 3 g TID  - .0 this AM  - Pre -weight 54.9, dry -weight 50.0 - on continuous Ca infusion at 90 mL/hr which could be contributing to this increase.   - Will continue dialysis while in-patient    Anemia of ESRD  - Hgb 10.1, within range      BMM  - Renal diet  - 1 L fluid restriction added to diet   - Novasource with meals  - Phos 4.2, continue binders   - Daily renal function panel     Darlyn Russell, MANFRED, APRN, FNP-C  Nephrology Department  Pager:  [phone]

## 2019-03-28 NOTE — PLAN OF CARE
Update:    Most recent ionized Ca high at 1.5. Phos a little better at 2.6.    Plan:  Stop calcium drip  Hold PM dose of calcium carbonate - resume in AM.  Continue q6 monitoring of calcium and phos with further adjustments TBD.

## 2019-03-28 NOTE — PROGRESS NOTES
[facility]  General Surgery  Progress Note    Subjective:     History of Present Illness:  No notes on file    Post-Op Info:  Procedure(s) (LRB):  PARATHYROIDECTOMY, Minimally Invasive Bilateral Exploration (Bilateral)   1 Day Post-Op     Interval History: POD 1 subtotal parathyroidectomy for secondary hyperparathyroidism.     Blood pressure well controlled on most of home regimen (verapamil, irbesartan, 1/2 dose hydralazine, carvedilol, held minoxidil).  Calcium requiring 6g PO, 4g IV, and initiation of gtt at 1.7g/hr to repletion iCa to >1.0. Nephrology assisted in adjustment of regimen overnight. Will go for HD today with further assistance of calcium bath. Now on 2g calcium PO TID and 0.5mg rocalcitrol PO BID. Remains asymptomatic; denies perioral and hand paresthesias and numbness.  Pain well controlled.  No hoarseness, dysphagia or voice changes.       Medications:  Continuous Infusions:   custom IV infusion builder (for pharmacist use only) 90 mL/hr at 03/28/19 0527     Scheduled Meds:   calcitRIOL  0.5 mcg Oral BID    calcium carbonate  2,000 mg Oral TID    carvedilol  25 mg Oral BID    famotidine  40 mg Oral QAM    hydrALAZINE  50 mg Oral Q8H    irbesartan  300 mg Oral QAM    lacosamide  50 mg Oral BID    levETIRAcetam  500 mg Oral BID    senna-docusate 8.6-50 mg  1 tablet Oral BID    sevelamer carbonate  800 mg Oral TID WM    tacrolimus  6 mg Oral BID    verapamil  240 mg Oral QHS     PRN Meds:sodium chloride, acetaminophen, acetaminophen-codeine 300-30mg, diphenhydrAMINE, ondansetron, oxyCODONE, oxyCODONE, promethazine (PHENERGAN) IVPB, ramelteon, sodium chloride 0.9%     Review of patient's allergies indicates:   Allergen Reactions    Chloral hydrate Hallucinations     Other reaction(s): Hallucinations  Other reaction(s): Hives    Hydrocodone Other (See Comments)     Mental status changes     Objective:     Vital Signs (Most Recent):  Temp: 97.3 °F (36.3 °C) (03/28/19  0545)  Pulse: 87 (03/28/19 0545)  Resp: 18 (03/28/19 0545)  BP: 133/83 (03/28/19 0545)  SpO2: 99 % (03/28/19 0545) Vital Signs (24h Range):  Temp:  [96 °F (35.6 °C)-98.9 °F (37.2 °C)] 97.3 °F (36.3 °C)  Pulse:  [81-91] 87  Resp:  [13-22] 18  SpO2:  [91 %-100 %] 99 %  BP: (126-166)/() 133/83     Weight: 52.2 kg (115 lb 1.3 oz)  Body mass index is 19.15 kg/m².    Intake/Output - Last 3 Shifts       03/26 0700 - 03/27 0659 03/27 0700 - 03/28 0659 03/28 0700 - 03/29 0659    P.O.  120     Total Intake(mL/kg)  120 (2.3)     Urine (mL/kg/hr)  0 (0)     Total Output  0     Net  +120            Urine Occurrence  0 x         Physical Exam    NAD, resting well  RRR  Non labored respirations  Neck incision cdi, minimal swelling, soft, no hematoma, minimally ttp  Voice strong, not hoarse, unchanged from pre-op  Extremities wwp    Significant Labs:  CBC:   Recent Labs   Lab 03/28/19 0527   WBC 5.74   RBC 3.90*   HGB 10.1*   HCT 31.5*   PLT 75*   MCV 81*   MCH 25.9*   MCHC 32.1     CMP:   Recent Labs   Lab 03/28/19 0527      CALCIUM 7.9*   ALBUMIN 2.8*   PROT 7.4   *   K 4.4   CO2 21*   CL 98   BUN 34*   CREATININE 8.0*   ALKPHOS 777*   ALT 17   AST 29   BILITOT 0.5     Coagulation:   Recent Labs   Lab 03/28/19 0527   LABPROT 11.6   INR 1.1     iCa 1.09 > 0.77 > 0.85 > 0.99 > 1.01  Vit D 9   > 87 > 107  Calcium corrected to 8.9    Significant Diagnostics:  I have reviewed and interpreted all pertinent imaging results/findings within the past 24 hours.    Assessment/Plan:     * Hyperparathyroidism due to end stage renal disease on dialysis  27 y/o female with pmhx of liver txp, ESRD on HD MWF (anuric), seizures and HTN s/p subtotal parathyroidectomy for secondary hyperparathyroidism 3/27. Post operatively developed hungry bone syndrome requiring initiation of calcium gtt in addition to oral supplementation.     - continue calcium gtt at 900 mg/ hr  - continue q2h iCa checks, if 0800 remains >1 will  space out lab checks  - continue 2g calcium PO TID, 0.5mg Rocaltrol BID  - appreciate nephrology assistance with hypocalcemia and HD  - appreciate hepatology assistance with immunosuppressant medication, morning tacro level pending  - regular diet  - prn pain medications  - ambulate, encourage IS q1h  - home medications restarted  - ice to incision prn        Katie Lynch MD  General Surgery  [facility]

## 2019-03-28 NOTE — SUBJECTIVE & OBJECTIVE
Interval History: POD 1 subtotal parathyroidectomy for secondary hyperparathyroidism.     Blood pressure well controlled on most of home regimen (verapamil, irbesartan, 1/2 dose hydralazine, carvedilol, held minoxidil).  Calcium requiring 6g PO, 4g IV, and initiation of gtt at 1.7g/hr to repletion iCa to >1.0. Nephrology assisted in adjustment of regimen overnight. Will go for HD today with further assistance of calcium bath. Now on 2g calcium PO TID and 0.5mg rocalcitrol PO BID. Remains asymptomatic; denies perioral and hand paresthesias and numbness.  Pain well controlled.  No hoarseness, dysphagia or voice changes.       Medications:  Continuous Infusions:   custom IV infusion builder (for pharmacist use only) 90 mL/hr at 03/28/19 0527     Scheduled Meds:   calcitRIOL  0.5 mcg Oral BID    calcium carbonate  2,000 mg Oral TID    carvedilol  25 mg Oral BID    famotidine  40 mg Oral QAM    hydrALAZINE  50 mg Oral Q8H    irbesartan  300 mg Oral QAM    lacosamide  50 mg Oral BID    levETIRAcetam  500 mg Oral BID    senna-docusate 8.6-50 mg  1 tablet Oral BID    sevelamer carbonate  800 mg Oral TID WM    tacrolimus  6 mg Oral BID    verapamil  240 mg Oral QHS     PRN Meds:sodium chloride, acetaminophen, acetaminophen-codeine 300-30mg, diphenhydrAMINE, ondansetron, oxyCODONE, oxyCODONE, promethazine (PHENERGAN) IVPB, ramelteon, sodium chloride 0.9%     Review of patient's allergies indicates:   Allergen Reactions    Chloral hydrate Hallucinations     Other reaction(s): Hallucinations  Other reaction(s): Hives    Hydrocodone Other (See Comments)     Mental status changes     Objective:     Vital Signs (Most Recent):  Temp: 97.3 °F (36.3 °C) (03/28/19 0545)  Pulse: 87 (03/28/19 0545)  Resp: 18 (03/28/19 0545)  BP: 133/83 (03/28/19 0545)  SpO2: 99 % (03/28/19 0545) Vital Signs (24h Range):  Temp:  [96 °F (35.6 °C)-98.9 °F (37.2 °C)] 97.3 °F (36.3 °C)  Pulse:  [81-91] 87  Resp:  [13-22] 18  SpO2:  [91 %-100  %] 99 %  BP: (126-166)/() 133/83     Weight: 52.2 kg (115 lb 1.3 oz)  Body mass index is 19.15 kg/m².    Intake/Output - Last 3 Shifts       03/26 0700 - 03/27 0659 03/27 0700 - 03/28 0659 03/28 0700 - 03/29 0659    P.O.  120     Total Intake(mL/kg)  120 (2.3)     Urine (mL/kg/hr)  0 (0)     Total Output  0     Net  +120            Urine Occurrence  0 x         Physical Exam    Significant Labs:  CBC:   Recent Labs   Lab 03/28/19 0527   WBC 5.74   RBC 3.90*   HGB 10.1*   HCT 31.5*   PLT 75*   MCV 81*   MCH 25.9*   MCHC 32.1     CMP:   Recent Labs   Lab 03/28/19 0527      CALCIUM 7.9*   ALBUMIN 2.8*   PROT 7.4   *   K 4.4   CO2 21*   CL 98   BUN 34*   CREATININE 8.0*   ALKPHOS 777*   ALT 17   AST 29   BILITOT 0.5     Coagulation:   Recent Labs   Lab 03/28/19 0527   LABPROT 11.6   INR 1.1     iCa 1.09 > 0.77 > 0.85 > 0.99 > 1.01  Vit D 9   > 87 > 107  Calcium corrected to 8.9    Significant Diagnostics:  I have reviewed and interpreted all pertinent imaging results/findings within the past 24 hours.

## 2019-03-28 NOTE — SUBJECTIVE & OBJECTIVE
PMH, PSH, FH, SH updated and reviewed     Review of Systems   Constitutional: Negative for unexpected weight change.   HENT:        Pain near incision, mild.   Eyes: Negative for visual disturbance.   Respiratory: Negative for shortness of breath.    Cardiovascular: Negative for chest pain.   Gastrointestinal: Negative for abdominal pain.   Genitourinary: Negative for urgency.   Musculoskeletal: Negative for arthralgias.   Skin: Negative for wound.   Neurological: Negative for headaches.   Hematological: Does not bruise/bleed easily.   Psychiatric/Behavioral: Negative for sleep disturbance.       PHYSICAL EXAMINATION:  Vitals:    03/28/19 0925   BP: (!) 164/102   Pulse: 84   Resp: 18   Temp:      Body mass index is 19.15 kg/m².    Physical Exam   Constitutional: She is oriented to person, place, and time. She appears well-developed. No distress.   Cachectic appearing   HENT:   Right Ear: External ear normal.   Left Ear: External ear normal.   Nose: Nose normal.   Hearing grossly normal  Dentition grossly normal   Neck: No tracheal deviation present. No thyromegaly present.   Low transverse cervical scar   Cardiovascular: Normal rate.   Murmur heard.  Pulmonary/Chest: Effort normal and breath sounds normal.   Abdominal: Soft. She exhibits no mass. There is no tenderness.   Musculoskeletal: She exhibits no edema.   No digital clubbing or extremity cyanosis   Neurological: She is alert and oriented to person, place, and time. Coordination normal.   Skin: No rash noted.   No subcutaneous nodules noted.   Psychiatric: She has a normal mood and affect. Her behavior is normal.   Alert and oriented to person, place, and situation.   Nursing note and vitals reviewed.

## 2019-03-29 NOTE — ASSESSMENT & PLAN NOTE
29 y/o female with pmhx of liver txp, ESRD on HD MWF (anuric), seizures and HTN s/p subtotal parathyroidectomy for secondary hyperparathyroidism 3/27. 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     - Appreciate endocrinology assistance with hypocalcemia calcium; now off gtt PM 3/28  - continue q6h iCa checks  - continue 2g calcium PO TID, 0.5mg Rocaltrol BID  - appreciate nephrology assistance HD  - appreciate hepatology assistance with immunosuppressant medications  - regular diet  - prn pain medications  - ambulate, encourage IS  - home medications restarted; HTN well controlled  - ice to incision prn

## 2019-03-29 NOTE — PROGRESS NOTES
[facility]  General Surgery  Progress Note    Subjective:     Post-Op Info:  Procedure(s) (LRB):  PARATHYROIDECTOMY, Minimally Invasive Bilateral Exploration (Bilateral)   2 Days Post-Op     Interval History:   No acute events.  Calcium gtt stopped and PM dose held.   Asymptomatic.   Tolerated HD with high calcium bath.  Tolerating diet.   Pain controlled.       Medications:  Continuous Infusions:    Scheduled Meds:   calcitRIOL  0.5 mcg Oral BID    calcium carbonate  2,000 mg Oral TID    carvedilol  25 mg Oral BID    ergocalciferol  50,000 Units Oral Daily    famotidine  40 mg Oral QAM    hydrALAZINE  50 mg Oral Q8H    irbesartan  300 mg Oral QAM    lacosamide  50 mg Oral BID    levETIRAcetam  500 mg Oral BID    senna-docusate 8.6-50 mg  1 tablet Oral BID    tacrolimus  6 mg Oral BID    verapamil  240 mg Oral QHS     PRN Meds:sodium chloride, acetaminophen, acetaminophen-codeine 300-30mg, diphenhydrAMINE, ondansetron, oxyCODONE, oxyCODONE, promethazine (PHENERGAN) IVPB, ramelteon, sodium chloride 0.9%     Review of patient's allergies indicates:   Allergen Reactions    Chloral hydrate Hallucinations     Other reaction(s): Hallucinations  Other reaction(s): Hives    Hydrocodone Other (See Comments)     Mental status changes     Objective:     Vital Signs (Most Recent):  Temp: 96.2 °F (35.7 °C) (03/29/19 0518)  Pulse: 89 (03/29/19 0518)  Resp: 18 (03/29/19 0518)  BP: 120/72 (03/29/19 0518)  SpO2: 99 % (03/29/19 0518) Vital Signs (24h Range):  Temp:  [96.2 °F (35.7 °C)-98 °F (36.7 °C)] 96.2 °F (35.7 °C)  Pulse:  [80-93] 89  Resp:  [18] 18  SpO2:  [97 %-99 %] 99 %  BP: (120-167)/() 120/72     Weight: 52.2 kg (115 lb 1.3 oz)  Body mass index is 19.15 kg/m².    Intake/Output - Last 3 Shifts       03/27 0700 - 03/28 0659 03/28 0700 - 03/29 0659    P.O. 120     Other  350    Total Intake(mL/kg) 120 (2.3) 350 (6.7)    Urine (mL/kg/hr) 0 (0)     Other  3650    Total Output 0 3650     Net +120 -3300          Urine Occurrence 0 x         Physical Exam  NAD, resting well  RRR  Non labored respirations  Neck incision cdi, minimal swelling, soft, no hematoma, non ttp  Voice strong, not hoarse, unchanged from pre-op  Extremities wwp    Significant Labs:  iCa 1.15 > 1.50 > AM pending  Phos 3.0      Significant Diagnostics:  I have reviewed and interpreted all pertinent imaging results/findings within the past 24 hours.    Assessment/Plan:     * Hyperparathyroidism due to end stage renal disease on dialysis  29 y/o female with pmhx of liver txp, ESRD on HD MWF (anuric), seizures and HTN s/p subtotal parathyroidectomy for secondary hyperparathyroidism 3/27. 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     - Appreciate endocrinology assistance with hypocalcemia calcium; now off gtt PM 3/28  - continue q6h iCa checks  - continue 2g calcium PO TID, 0.5mg Rocaltrol BID  - appreciate nephrology assistance HD  - appreciate hepatology assistance with immunosuppressant medications  - regular diet  - prn pain medications  - ambulate, encourage IS  - home medications restarted; HTN well controlled  - ice to incision prn        Katie Lynch MD  General Surgery  [facility]

## 2019-03-29 NOTE — SUBJECTIVE & OBJECTIVE
"Interval HPI:   Overnight events: Calcium trending down after calcium gtt stopped for elevated calcium and PO calcium held last night. Phos on repeat is now normal. PTH is 100 (appropriate elevation)    /77 (Patient Position: Lying)   Pulse 88   Temp 97.5 °F (36.4 °C) (Oral)   Resp 16   Ht 5' 5" (1.651 m)   Wt 52.2 kg (115 lb 1.3 oz)   SpO2 98%   Breastfeeding? No   BMI 19.15 kg/m²     Labs Reviewed and Include    Recent Labs   Lab 03/29/19  0546   GLU 94   CALCIUM 7.2*   ALBUMIN 2.8*   PROT 7.3   *   K 4.4   CO2 23   CL 97   BUN 18   CREATININE 5.6*   ALKPHOS 765*   ALT 9*   AST 28   BILITOT 0.7     Lab Results   Component Value Date    WBC 4.22 03/29/2019    HGB 9.9 (L) 03/29/2019    HCT 31.1 (L) 03/29/2019    MCV 81 (L) 03/29/2019    PLT 82 (L) 03/29/2019     No results for input(s): TSH, FREET4 in the last 168 hours.  Lab Results   Component Value Date    HGBA1C 4.1 09/20/2018       Nutritional status:   Body mass index is 19.15 kg/m².  Lab Results   Component Value Date    ALBUMIN 2.8 (L) 03/29/2019    ALBUMIN 2.8 (L) 03/28/2019    ALBUMIN 2.8 (L) 03/27/2019     No results found for: PREALBUMIN    Estimated Creatinine Clearance: 12.3 mL/min (A) (based on SCr of 5.6 mg/dL (H)).    Accu-Checks  No results for input(s): POCTGLUCOSE in the last 72 hours.    Current Medications and/or Treatments Impacting Glycemic Control  Immunotherapy:    Immunosuppressants         Stop Route Frequency     tacrolimus capsule 6 mg      -- Oral 2 times daily        Steroids:   Hormones (From admission, onward)    None        Pressors:    Autonomic Drugs (From admission, onward)    None        Hyperglycemia/Diabetes Medications:   Antihyperglycemics (From admission, onward)    None        "

## 2019-03-29 NOTE — PROGRESS NOTES
[facility]  Endocrinology  Progress Note    Admit Date: 3/27/2019     Reason for Consult: Hypocalcemia after subtotal parathyroidectomy for secondary hyperparathyroidism due to ESRD.    Surgical Procedure and Date: Subtotal parathyroidectomy (portion of L inferior gland left in situ) 3/27/2019    HPI:   Patient is a 28 y.o. 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 She was noted to be hypocalcemic after surgery, necessitating a continuous calcium infusion, calcitriol and PO calcium carbonate. We are consulted to assist with management. At the time of initial consult, she's received 2 doses of calcitriol (0.25 then 0.5 mcg), 4 doses of calcium carbonate (total 6000 mg), calcium gluconate x 2 IV, and ergo 50,000 IU. Trend of ionized calcium below. She remains on calcium infusion (10 g Ca gluconate in 1 liter of D5W) running at 90/hr. No perioral numbness, tingling fingertips, tetany or other symptoms of hypocalcemia.     Ref. Range 9/26/2018 07:16 3/8/2019 13:40 3/27/2019 17:27 3/28/2019 05:27   PTH 9.0 - 77.0 pg/mL 1,429.0 (H) 2,396.0 (H) 87.0 (H) 107.0 (H)      Ref. Range 3/27/2019 20:28 3/27/2019 22:57 3/28/2019 01:22 3/28/2019 04:10 3/28/2019 04:10 3/28/2019 05:27 3/28/2019 08:11   Calcium, Ion 1.06 - 1.42 mmol/L 0.81 (L) 0.77 (L) 0.85 (L) 0.99 (L) 1.01 (L) 0.97 (L) 0.92 (L)      Ref. Range 3/12/2019 08:10 3/13/2019 05:31 3/27/2019 11:28 3/28/2019 05:27   Phosphorus 2.7 - 4.5 mg/dL 5.2 (H) 6.1 (H) 5.3 (H) 4.2       Interval HPI:   Overnight events: Calcium trending down after calcium gtt stopped for elevated calcium and PO calcium held last night. Phos on repeat is now normal. PTH is 100 (appropriate elevation). Patient denies any symptoms of perioral numbness, tingling or muscle cramps. She is  "asking when she can be discharged.    /77 (Patient Position: Lying)   Pulse 88   Temp 97.5 °F (36.4 °C) (Oral)   Resp 16   Ht 5' 5" (1.651 m)   Wt 52.2 kg (115 lb 1.3 oz)   SpO2 98%   Breastfeeding? No   BMI 19.15 kg/m²      Labs Reviewed and Include    Recent Labs   Lab 03/29/19  0546   GLU 94   CALCIUM 7.2*   ALBUMIN 2.8*   PROT 7.3   *   K 4.4   CO2 23   CL 97   BUN 18   CREATININE 5.6*   ALKPHOS 765*   ALT 9*   AST 28   BILITOT 0.7     Lab Results   Component Value Date    WBC 4.22 03/29/2019    HGB 9.9 (L) 03/29/2019    HCT 31.1 (L) 03/29/2019    MCV 81 (L) 03/29/2019    PLT 82 (L) 03/29/2019     No results for input(s): TSH, FREET4 in the last 168 hours.  Lab Results   Component Value Date    HGBA1C 4.1 09/20/2018       Nutritional status:   Body mass index is 19.15 kg/m².  Lab Results   Component Value Date    ALBUMIN 2.8 (L) 03/29/2019    ALBUMIN 2.8 (L) 03/28/2019    ALBUMIN 2.8 (L) 03/27/2019     No results found for: PREALBUMIN    Estimated Creatinine Clearance: 12.3 mL/min (A) (based on SCr of 5.6 mg/dL (H)).    Accu-Checks  No results for input(s): POCTGLUCOSE in the last 72 hours.    Current Medications and/or Treatments Impacting Glycemic Control  Immunotherapy:    Immunosuppressants         Stop Route Frequency     tacrolimus capsule 6 mg      -- Oral 2 times daily        Steroids:   Hormones (From admission, onward)    None        Pressors:    Autonomic Drugs (From admission, onward)    None        Hyperglycemia/Diabetes Medications:   Antihyperglycemics (From admission, onward)    None          ASSESSMENT and PLAN    * Hyperparathyroidism due to end stage renal disease on dialysis  S/p subtotal parathyroidectomy  PTH appropriately decreased after surgery.    Hypophosphatemia  Monitor q6. Don't replace unless <1.0 to prevent worsening of hypocalcemia.    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    Goal ionized calcium >0.90    Continue calcitriol 0.5 BID  Continue calcium carbonate 2000 mg TID  If next ionized calcium is below goal or if patient develops symptoms of hypocalcemia, resume calcium gtt at 50 cc/hr  Hold off on phos replacement unless <1.0 or developing muscle weakness.  Hold renvela for now unless phos becomes elevated    Counseled patient on symptoms of hypocalcemia to watch out for. Advised to notify nursing for any symptoms (perioral numbness, muscle cramps, tingling in fingers).    Discharge plans:  Will likely require calcitriol + calcium - doses TBD      ESRD on dialysis  Can help regulate calcium levels with dialysis.          Dylan Soto MD  Endocrinology  [facility]

## 2019-03-29 NOTE — ASSESSMENT & PLAN NOTE
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    Goal ionized calcium >0.90    Continue calcitriol 0.5 BID  Continue calcium carbonate 2000 mg TID  If next ionized calcium is below goal or if patient develops symptoms of hypocalcemia, resume calcium gtt at 50 cc/hr  Hold off on phos replacement unless <1.0 or developing muscle weakness.  Hold renvela for now unless phos becomes elevated    Counseled patient on symptoms of hypocalcemia to watch out for. Advised to notify nursing for any symptoms (perioral numbness, muscle cramps, tingling in fingers).    Discharge plans:  Will likely require calcitriol + calcium - doses TBD

## 2019-03-29 NOTE — SUBJECTIVE & OBJECTIVE
Interval History:   No acute events.  Calcium gtt stopped and PM dose held.   Asymptomatic.   Tolerated HD with high calcium bath.  Tolerating diet.   Pain controlled.       Medications:  Continuous Infusions:    Scheduled Meds:   calcitRIOL  0.5 mcg Oral BID    calcium carbonate  2,000 mg Oral TID    carvedilol  25 mg Oral BID    ergocalciferol  50,000 Units Oral Daily    famotidine  40 mg Oral QAM    hydrALAZINE  50 mg Oral Q8H    irbesartan  300 mg Oral QAM    lacosamide  50 mg Oral BID    levETIRAcetam  500 mg Oral BID    senna-docusate 8.6-50 mg  1 tablet Oral BID    tacrolimus  6 mg Oral BID    verapamil  240 mg Oral QHS     PRN Meds:sodium chloride, acetaminophen, acetaminophen-codeine 300-30mg, diphenhydrAMINE, ondansetron, oxyCODONE, oxyCODONE, promethazine (PHENERGAN) IVPB, ramelteon, sodium chloride 0.9%     Review of patient's allergies indicates:   Allergen Reactions    Chloral hydrate Hallucinations     Other reaction(s): Hallucinations  Other reaction(s): Hives    Hydrocodone Other (See Comments)     Mental status changes     Objective:     Vital Signs (Most Recent):  Temp: 96.2 °F (35.7 °C) (03/29/19 0518)  Pulse: 89 (03/29/19 0518)  Resp: 18 (03/29/19 0518)  BP: 120/72 (03/29/19 0518)  SpO2: 99 % (03/29/19 0518) Vital Signs (24h Range):  Temp:  [96.2 °F (35.7 °C)-98 °F (36.7 °C)] 96.2 °F (35.7 °C)  Pulse:  [80-93] 89  Resp:  [18] 18  SpO2:  [97 %-99 %] 99 %  BP: (120-167)/() 120/72     Weight: 52.2 kg (115 lb 1.3 oz)  Body mass index is 19.15 kg/m².    Intake/Output - Last 3 Shifts       03/27 0700 - 03/28 0659 03/28 0700 - 03/29 0659    P.O. 120     Other  350    Total Intake(mL/kg) 120 (2.3) 350 (6.7)    Urine (mL/kg/hr) 0 (0)     Other  3650    Total Output 0 3650    Net +120 -3300          Urine Occurrence 0 x         Physical Exam  NAD, resting well  RRR  Non labored respirations  Neck incision cdi, minimal swelling, soft, no hematoma, non ttp  Voice strong, not hoarse,  unchanged from pre-op  Extremities wwp    Significant Labs:  iCa 1.15 > 1.50 > AM pending  Phos 3.0      Significant Diagnostics:  I have reviewed and interpreted all pertinent imaging results/findings within the past 24 hours.

## 2019-03-29 NOTE — PLAN OF CARE
Problem: Adult Inpatient Plan of Care  Goal: Plan of Care Review  Outcome: Ongoing (interventions implemented as appropriate)  Pt AAOx4 and VSS. Pt is progressing with plan of care. Free of skin breakdown as the pt positioned/repositioned well independently. Clean, dry, and intact dressing noted on neck. SCDs in place at all times. Incentive spirometer at bedside and pt instructed on its use. Pain controlled well with PRN meds. Frequent rounds made to assess pain and safety and no complaints at this time noted. Side rails up x 2. Bed locked. Call light within reach. No falls noted. Will continue to monitor.

## 2019-03-29 NOTE — PLAN OF CARE
Addendum:    12PM labs resulted; ionized calcium trending down slightly:     Ref. Range 3/29/2019 05:46 3/29/2019 11:33   Calcium, Ion Latest Ref Range: 1.06 - 1.42 mmol/L 0.84 (L) 0.80 (L)   Phosphorus Latest Ref Range: 2.7 - 4.5 mg/dL 2.9 2.9     Plan:  Increase calcitriol to 0.75 mcg BID  Increase calcium carbonate 2000 mg from TID to QID - additional dose now.  If next calcium check tonight goes down below 0.8, will resume calcium gtt.

## 2019-03-30 NOTE — ASSESSMENT & PLAN NOTE
27 y/o female with pmhx of liver txp, ESRD on HD MWF (anuric), seizures and HTN s/p subtotal parathyroidectomy for secondary hyperparathyroidism 3/27. 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     - Appreciate endocrinology assistance with hypocalcemia calcium; gtt restarted last night  - continue q6h iCa checks  - continue 2g calcium PO TID, 0.5mg Rocaltrol BID  - appreciate nephrology assistance HD  - appreciate hepatology assistance with immunosuppressant medications  - regular diet  - prn pain medications  - ambulate, encourage IS  - home medications restarted; HTN well controlled  - ice to incision prn

## 2019-03-30 NOTE — PROGRESS NOTES
Dialysis completed. Needles removed from left forearm fistula with pressure for 5 minutes each with hemostasis achieved. Gauze and tape to sites. Patient dialyzed for 3.5 hours with fluid removal of 2.5 liters. Tolerated well with stable vital signs. Report to Rebecca Galloway.

## 2019-03-30 NOTE — PROGRESS NOTES
Patient currently on hemodialysis for removal of uremic toxins and volume control.   I personally saw and examined the patient on hemodialysis.  The patient is tolerating the treatment, see hemodyalisis flow sheet for vitals and assessemts. I also reviewed the chart and current medication. The dialysis bath was adjusted.     Volume management/HTN: UF 2 liter  Dialyzed on a 3 calcium bath  Patient doing well clinically

## 2019-03-30 NOTE — PROGRESS NOTES
"[facility]  Endocrinology  Progress Note    Admit Date: 3/27/2019     Reason for Consult: Hypocalcemia after subtotal parathyroidectomy for secondary hyperparathyroidism due to ESRD.    Surgical Procedure and Date: Subtotal parathyroidectomy (portion of L inferior gland left in situ) 3/27/2019    HPI:   Patient is a 28 y.o. 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 She was noted to be hypocalcemic after surgery, necessitating a continuous calcium infusion, calcitriol and PO calcium carbonate. We are consulted to assist with management. At the time of initial consult, she's received 2 doses of calcitriol (0.25 then 0.5 mcg), 4 doses of calcium carbonate (total 6000 mg), calcium gluconate x 2 IV, and ergo 50,000 IU. Trend of PTH, ionized calcium and phos at the time of initial consult is below.      Ref. Range 9/26/2018 07:16 3/8/2019 13:40 3/27/2019 17:27 3/28/2019 05:27   PTH 9.0 - 77.0 pg/mL 1,429.0 (H) 2,396.0 (H) 87.0 (H) 107.0 (H)      Ref. Range 3/27/2019 20:28 3/27/2019 22:57 3/28/2019 01:22 3/28/2019 04:10 3/28/2019 04:10 3/28/2019 05:27 3/28/2019 08:11   Calcium, Ion 1.06 - 1.42 mmol/L 0.81 (L) 0.77 (L) 0.85 (L) 0.99 (L) 1.01 (L) 0.97 (L) 0.92 (L)      Ref. Range 3/12/2019 08:10 3/13/2019 05:31 3/27/2019 11:28 3/28/2019 05:27   Phosphorus 2.7 - 4.5 mg/dL 5.2 (H) 6.1 (H) 5.3 (H) 4.2       Interval HPI:   Overnight events: Serum calcium trended down yesterday evening, so calcium gluconate gtt was restarted. Improved this AM.    /78   Pulse 71   Temp 97.5 °F (36.4 °C) (Oral)   Resp 18   Ht 5' 5" (1.651 m)   Wt 52.2 kg (115 lb 1.3 oz)   SpO2 99%   Breastfeeding? No   BMI 19.15 kg/m²      Labs Reviewed and Include    Recent Labs   Lab 03/30/19  0355      CALCIUM 7.0*   ALBUMIN " 2.5*   PROT 6.8   *   K 4.3   CO2 23   CL 98   BUN 25*   CREATININE 7.3*   ALKPHOS 765*   ALT 7*   AST 29   BILITOT 0.6     Lab Results   Component Value Date    WBC 3.33 (L) 03/30/2019    HGB 9.5 (L) 03/30/2019    HCT 29.5 (L) 03/30/2019    MCV 79 (L) 03/30/2019    PLT 76 (L) 03/30/2019     No results for input(s): TSH, FREET4 in the last 168 hours.  Lab Results   Component Value Date    HGBA1C 4.1 09/20/2018       Nutritional status:   Body mass index is 19.15 kg/m².  Lab Results   Component Value Date    ALBUMIN 2.5 (L) 03/30/2019    ALBUMIN 2.8 (L) 03/29/2019    ALBUMIN 2.8 (L) 03/29/2019     No results found for: PREALBUMIN    Estimated Creatinine Clearance: 9.5 mL/min (A) (based on SCr of 7.3 mg/dL (H)).    Accu-Checks  No results for input(s): POCTGLUCOSE in the last 72 hours.    Current Medications and/or Treatments Impacting Glycemic Control  Immunotherapy:    Immunosuppressants         Stop Route Frequency     tacrolimus capsule 6 mg      -- Oral 2 times daily        Steroids:   Hormones (From admission, onward)    None        Pressors:    Autonomic Drugs (From admission, onward)    None        Hyperglycemia/Diabetes Medications:   Antihyperglycemics (From admission, onward)    None          ASSESSMENT and PLAN    * Hyperparathyroidism due to end stage renal disease on dialysis  S/p subtotal parathyroidectomy  PTH appropriately decreased after surgery.    Hypophosphatemia  Monitor q6. Don't replace unless <1.0 to prevent worsening of hypocalcemia.    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     Goal ionized calcium >0.90    Serum calcium trended down while off calcium drip, so it was resumed last night.    Increase calcitriol 0.5 > 1 mcg BID  Increased calcium carbonate 2000 mg TID > QID  Hold off on phos replacement unless <1.0 or developing muscle weakness.  Hold renvela for now unless phos becomes  elevated    Counseled patient on symptoms of hypocalcemia to watch out for. Advised to notify nursing for any symptoms (perioral numbness, muscle cramps, tingling in fingers).    Discharge plans:  Will require calcitriol + calcium - doses TBD    ESRD on dialysis  Can help regulate calcium levels with dialysis.          Dylan Soto MD  Endocrinology  [facility], Gita Stanton MD,  have personally taken the history and examined the patient and agree with the resident's note as stated above.  Hungry bone

## 2019-03-30 NOTE — ASSESSMENT & PLAN NOTE
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     Goal ionized calcium >0.90    Serum calcium trended down while off calcium drip, so it was resumed last night.    Increase calcitriol 0.5 > 1 mcg BID  Increased calcium carbonate 2000 mg TID > QID  Hold off on phos replacement unless <1.0 or developing muscle weakness.  Hold renvela for now unless phos becomes elevated    Counseled patient on symptoms of hypocalcemia to watch out for. Advised to notify nursing for any symptoms (perioral numbness, muscle cramps, tingling in fingers).    Discharge plans:  Will require calcitriol + calcium - doses TBD

## 2019-03-30 NOTE — SUBJECTIVE & OBJECTIVE
"Interval HPI:   Overnight events: Serum calcium trended down yesterday evening, so calcium gluconate gtt was restarted. Improved this AM.    /78   Pulse 71   Temp 97.5 °F (36.4 °C) (Oral)   Resp 18   Ht 5' 5" (1.651 m)   Wt 52.2 kg (115 lb 1.3 oz)   SpO2 99%   Breastfeeding? No   BMI 19.15 kg/m²     Labs Reviewed and Include    Recent Labs   Lab 03/30/19  0355      CALCIUM 7.0*   ALBUMIN 2.5*   PROT 6.8   *   K 4.3   CO2 23   CL 98   BUN 25*   CREATININE 7.3*   ALKPHOS 765*   ALT 7*   AST 29   BILITOT 0.6     Lab Results   Component Value Date    WBC 3.33 (L) 03/30/2019    HGB 9.5 (L) 03/30/2019    HCT 29.5 (L) 03/30/2019    MCV 79 (L) 03/30/2019    PLT 76 (L) 03/30/2019     No results for input(s): TSH, FREET4 in the last 168 hours.  Lab Results   Component Value Date    HGBA1C 4.1 09/20/2018       Nutritional status:   Body mass index is 19.15 kg/m².  Lab Results   Component Value Date    ALBUMIN 2.5 (L) 03/30/2019    ALBUMIN 2.8 (L) 03/29/2019    ALBUMIN 2.8 (L) 03/29/2019     No results found for: PREALBUMIN    Estimated Creatinine Clearance: 9.5 mL/min (A) (based on SCr of 7.3 mg/dL (H)).    Accu-Checks  No results for input(s): POCTGLUCOSE in the last 72 hours.    Current Medications and/or Treatments Impacting Glycemic Control  Immunotherapy:    Immunosuppressants         Stop Route Frequency     tacrolimus capsule 6 mg      -- Oral 2 times daily        Steroids:   Hormones (From admission, onward)    None        Pressors:    Autonomic Drugs (From admission, onward)    None        Hyperglycemia/Diabetes Medications:   Antihyperglycemics (From admission, onward)    None        "

## 2019-03-30 NOTE — PROGRESS NOTES
[facility]  General Surgery  Progress Note    Subjective:     History of Present Illness:  No notes on file    Post-Op Info:  Procedure(s) (LRB):  PARATHYROIDECTOMY, Minimally Invasive Bilateral Exploration (Bilateral)   3 Days Post-Op     Interval History:   No acute events.  Calcium gtt started.   Asymptomatic.   Getting HD with high calcium bath this am.  Tolerating diet.   Pain controlled.        Medications:  Continuous Infusions:   custom IV infusion builder 50 mL/hr at 03/29/19 2111     Scheduled Meds:   sodium chloride 0.9%   Intravenous Once    calcitRIOL  0.75 mcg Oral BID    calcium carbonate  2,000 mg Oral QID    carvedilol  25 mg Oral BID    ergocalciferol  50,000 Units Oral Daily    famotidine  40 mg Oral QAM    hydrALAZINE  50 mg Oral Q8H    irbesartan  300 mg Oral QAM    lacosamide  50 mg Oral BID    levETIRAcetam  500 mg Oral BID    senna-docusate 8.6-50 mg  1 tablet Oral BID    tacrolimus  6 mg Oral BID    verapamil  240 mg Oral QHS     PRN Meds:sodium chloride, acetaminophen, acetaminophen-codeine 300-30mg, diphenhydrAMINE, ondansetron, oxyCODONE, oxyCODONE, promethazine (PHENERGAN) IVPB, ramelteon, sodium chloride 0.9%     Review of patient's allergies indicates:   Allergen Reactions    Chloral hydrate Hallucinations     Other reaction(s): Hallucinations  Other reaction(s): Hives    Hydrocodone Other (See Comments)     Mental status changes     Objective:     Vital Signs (Most Recent):  Temp: 97.2 °F (36.2 °C) (03/30/19 0307)  Pulse: 75 (03/30/19 0718)  Resp: 16 (03/30/19 0307)  BP: 121/81 (03/30/19 0604)  SpO2: 99 % (03/30/19 0307) Vital Signs (24h Range):  Temp:  [97.2 °F (36.2 °C)-98 °F (36.7 °C)] 97.2 °F (36.2 °C)  Pulse:  [75-91] 75  Resp:  [14-18] 16  SpO2:  [95 %-99 %] 99 %  BP: (107-147)/(56-95) 121/81     Weight: 52.2 kg (115 lb 1.3 oz)  Body mass index is 19.15 kg/m².    Intake/Output - Last 3 Shifts       03/28 0700 - 03/29 0659 03/29 0700 - 03/30  0659 03/30 0700 - 03/31 0659    P.O.       Other 350      Total Intake(mL/kg) 350 (6.7)      Urine (mL/kg/hr)       Other 3650      Total Output 3650      Net -3300             Urine Occurrence  2 x           Physical Exam   NAD, resting well  RRR  Non labored respirations  Neck incision cdi, minimal swelling, soft, no hematoma, non ttp  Voice strong, not hoarse, unchanged from pre-op  Extremities wwp    Significant Labs:  CBC:   Recent Labs   Lab 03/30/19  0355   WBC 3.33*   RBC 3.72*   HGB 9.5*   HCT 29.5*   PLT 76*   MCV 79*   MCH 25.5*   MCHC 32.2     CMP:   Recent Labs   Lab 03/30/19  0355      CALCIUM 7.0*   ALBUMIN 2.5*   PROT 6.8   *   K 4.3   CO2 23   CL 98   BUN 25*   CREATININE 7.3*   ALKPHOS 765*   ALT 7*   AST 29   BILITOT 0.6     Assessment/Plan:     * Hyperparathyroidism due to end stage renal disease on dialysis  27 y/o female with pmhx of liver txp, ESRD on HD MWF (anuric), seizures and HTN s/p subtotal parathyroidectomy for secondary hyperparathyroidism 3/27. 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     - Appreciate endocrinology assistance with hypocalcemia calcium; gtt restarted last night  - continue q6h iCa checks  - continue 2g calcium PO TID, 0.5mg Rocaltrol BID  - appreciate nephrology assistance HD  - appreciate hepatology assistance with immunosuppressant medications  - regular diet  - prn pain medications  - ambulate, encourage IS  - home medications restarted; HTN well controlled  - ice to incision prn        Jun Sibley MD  General Surgery  [facility]

## 2019-03-30 NOTE — SUBJECTIVE & OBJECTIVE
Interval History:   No acute events.  Calcium gtt started.   Asymptomatic.   Getting HD with high calcium bath this am.  Tolerating diet.   Pain controlled.        Medications:  Continuous Infusions:   custom IV infusion builder 50 mL/hr at 03/29/19 2111     Scheduled Meds:   sodium chloride 0.9%   Intravenous Once    calcitRIOL  0.75 mcg Oral BID    calcium carbonate  2,000 mg Oral QID    carvedilol  25 mg Oral BID    ergocalciferol  50,000 Units Oral Daily    famotidine  40 mg Oral QAM    hydrALAZINE  50 mg Oral Q8H    irbesartan  300 mg Oral QAM    lacosamide  50 mg Oral BID    levETIRAcetam  500 mg Oral BID    senna-docusate 8.6-50 mg  1 tablet Oral BID    tacrolimus  6 mg Oral BID    verapamil  240 mg Oral QHS     PRN Meds:sodium chloride, acetaminophen, acetaminophen-codeine 300-30mg, diphenhydrAMINE, ondansetron, oxyCODONE, oxyCODONE, promethazine (PHENERGAN) IVPB, ramelteon, sodium chloride 0.9%     Review of patient's allergies indicates:   Allergen Reactions    Chloral hydrate Hallucinations     Other reaction(s): Hallucinations  Other reaction(s): Hives    Hydrocodone Other (See Comments)     Mental status changes     Objective:     Vital Signs (Most Recent):  Temp: 97.2 °F (36.2 °C) (03/30/19 0307)  Pulse: 75 (03/30/19 0718)  Resp: 16 (03/30/19 0307)  BP: 121/81 (03/30/19 0604)  SpO2: 99 % (03/30/19 0307) Vital Signs (24h Range):  Temp:  [97.2 °F (36.2 °C)-98 °F (36.7 °C)] 97.2 °F (36.2 °C)  Pulse:  [75-91] 75  Resp:  [14-18] 16  SpO2:  [95 %-99 %] 99 %  BP: (107-147)/(56-95) 121/81     Weight: 52.2 kg (115 lb 1.3 oz)  Body mass index is 19.15 kg/m².    Intake/Output - Last 3 Shifts       03/28 0700 - 03/29 0659 03/29 0700 - 03/30 0659 03/30 0700 - 03/31 0659    P.O.       Other 350      Total Intake(mL/kg) 350 (6.7)      Urine (mL/kg/hr)       Other 3650      Total Output 3650      Net -3300             Urine Occurrence  2 x           Physical Exam   NAD, resting well  RRR  Non labored  respirations  Neck incision cdi, minimal swelling, soft, no hematoma, non ttp  Voice strong, not hoarse, unchanged from pre-op  Extremities wwp    Significant Labs:  CBC:   Recent Labs   Lab 03/30/19 0355   WBC 3.33*   RBC 3.72*   HGB 9.5*   HCT 29.5*   PLT 76*   MCV 79*   MCH 25.5*   MCHC 32.2     CMP:   Recent Labs   Lab 03/30/19 0355      CALCIUM 7.0*   ALBUMIN 2.5*   PROT 6.8   *   K 4.3   CO2 23   CL 98   BUN 25*   CREATININE 7.3*   ALKPHOS 765*   ALT 7*   AST 29   BILITOT 0.6

## 2019-03-30 NOTE — PROGRESS NOTES
Patient received from floor with standing weight obtained. Maintenance dialysis(TTS) began per orders via 15 gauge buttonhole needles to left forearm fistula.

## 2019-03-31 NOTE — PROGRESS NOTES
"[facility]  Endocrinology  Progress Note    Admit Date: 3/27/2019     Reason for Consult: Hypocalcemia after subtotal parathyroidectomy for secondary hyperparathyroidism due to ESRD.    Surgical Procedure and Date: Subtotal parathyroidectomy (portion of L inferior gland left in situ) 3/27/2019    HPI:   Patient is a 28 y.o. 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 She was noted to be hypocalcemic after surgery, necessitating a continuous calcium infusion, calcitriol and PO calcium carbonate. We are consulted to assist with management. At the time of initial consult, she's received 2 doses of calcitriol (0.25 then 0.5 mcg), 4 doses of calcium carbonate (total 6000 mg), calcium gluconate x 2 IV, and ergo 50,000 IU. Trend of ionized calcium at the time of consult is below.     Ref. Range 9/26/2018 07:16 3/8/2019 13:40 3/27/2019 17:27 3/28/2019 05:27   PTH 9.0 - 77.0 pg/mL 1,429.0 (H) 2,396.0 (H) 87.0 (H) 107.0 (H)      Ref. Range 3/27/2019 20:28 3/27/2019 22:57 3/28/2019 01:22 3/28/2019 04:10 3/28/2019 04:10 3/28/2019 05:27 3/28/2019 08:11   Calcium, Ion 1.06 - 1.42 mmol/L 0.81 (L) 0.77 (L) 0.85 (L) 0.99 (L) 1.01 (L) 0.97 (L) 0.92 (L)      Ref. Range 3/12/2019 08:10 3/13/2019 05:31 3/27/2019 11:28 3/28/2019 05:27   Phosphorus 2.7 - 4.5 mg/dL 5.2 (H) 6.1 (H) 5.3 (H) 4.2       Interval HPI:   Overnight events: Calcium improving with increased calcitriol and calcium carbonate. Remains on calcium gtt at 30/hr. Last ionized calcium 1.04; last corrected ~9.4. Mg 2.1; phos 2.6. Reports back pain related to sickle cell disease.    BP (!) 181/114 (BP Location: Right arm, Patient Position: Lying)   Pulse 86   Temp 97.6 °F (36.4 °C) (Oral)   Resp 18   Ht 5' 5" (1.651 m)   Wt 52.2 kg (115 lb 1.3 oz)   SpO2 99% "   Breastfeeding? No   BMI 19.15 kg/m²      Labs Reviewed and Include    Recent Labs   Lab 03/31/19  0555   GLU 91   CALCIUM 8.4*   ALBUMIN 2.7*   *   K 4.5   CO2 24      BUN 20   CREATININE 5.2*     Lab Results   Component Value Date    WBC 3.33 (L) 03/30/2019    HGB 9.5 (L) 03/30/2019    HCT 29.5 (L) 03/30/2019    MCV 79 (L) 03/30/2019    PLT 76 (L) 03/30/2019     No results for input(s): TSH, FREET4 in the last 168 hours.  Lab Results   Component Value Date    HGBA1C 4.1 09/20/2018       Nutritional status:   Body mass index is 19.15 kg/m².  Lab Results   Component Value Date    ALBUMIN 2.7 (L) 03/31/2019    ALBUMIN 2.5 (L) 03/30/2019    ALBUMIN 2.8 (L) 03/29/2019     No results found for: PREALBUMIN    Estimated Creatinine Clearance: 13.3 mL/min (A) (based on SCr of 5.2 mg/dL (H)).    Accu-Checks  No results for input(s): POCTGLUCOSE in the last 72 hours.    Current Medications and/or Treatments Impacting Glycemic Control  Immunotherapy:    Immunosuppressants         Stop Route Frequency     tacrolimus capsule 6 mg      -- Oral 2 times daily        Steroids:   Hormones (From admission, onward)    None        Pressors:    Autonomic Drugs (From admission, onward)    None        Hyperglycemia/Diabetes Medications:   Antihyperglycemics (From admission, onward)    None          ASSESSMENT and PLAN    * Hyperparathyroidism due to end stage renal disease on dialysis  S/p subtotal parathyroidectomy    Hypophosphatemia  Monitor q6. Don't replace unless <1.0 to prevent worsening of hypocalcemia.    Hypocalcemia  Due to hypoparathyroidism and hungry bone syndrome. Phos remaining low-normal.    Goal ionized calcium >0.90    Calcium gtt titrated down to 30/hr yesterday - will hold it today and see if she can maintain off the drip. Restart if ionized calcium drops below goal.    Continue calcitriol 1 mcg BID  Continue calcium carbonate 2000 mg TID > QID  Hold off on phos replacement unless <1.0 or developing  muscle weakness.  Hold renvela for now unless phos becomes elevated    Counseled patient on symptoms of hypocalcemia to watch out for. Advised to notify nursing for any symptoms (perioral numbness, muscle cramps, tingling in fingers).    Discharge plans:  Will require calcitriol + calcium - doses TBD    ESRD on dialysis  Can help regulate calcium levels with dialysis.          Dylan Soto MD  Endocrinology  [facility], Gita Stanton MD,  have personally taken the history and examined the patient and agree with the resident's note as stated above.

## 2019-03-31 NOTE — CARE UPDATE
B/p still elevated. Dr. Monaco notified that there was no changes in vs since first dose of Labetolol given. Second dose of 10mg labetolol ordered. Will continue to folllow.

## 2019-03-31 NOTE — ASSESSMENT & PLAN NOTE
Due to hypoparathyroidism and hungry bone syndrome. Phos remaining low-normal.    Goal ionized calcium >0.90    Calcium gtt titrated down to 30/hr yesterday - will hold it today and see if she can maintain off the drip. Restart if ionized calcium drops below goal.    Continue calcitriol 1 mcg BID  Continue calcium carbonate 2000 mg TID > QID  Hold off on phos replacement unless <1.0 or developing muscle weakness.  Hold renvela for now unless phos becomes elevated    Counseled patient on symptoms of hypocalcemia to watch out for. Advised to notify nursing for any symptoms (perioral numbness, muscle cramps, tingling in fingers).    Discharge plans:  Will require calcitriol + calcium - doses TBD

## 2019-03-31 NOTE — CARE UPDATE
B/p=174/114, h/r=80, Dr. Monaco notified of above. Will continue monitor b/p. PRN labetolol to be given.

## 2019-03-31 NOTE — CARE UPDATE
B/p=181/114 Dr. Monaco notified of above.Labetolo 10 mg given IVP slowly over 5mins. No immediate changes noted to vs. HR (81-78) b/p unchanged.  Will continue to monitor for s/s of distress.

## 2019-03-31 NOTE — CARE UPDATE
Dr. Og notified of b/p=180/114 after labetolol x2 and po hydralazine. Dr. Monaco states he will order po clonidine  To help lower b/p. Willl continue to monior b/p.

## 2019-03-31 NOTE — PLAN OF CARE
Problem: Adult Inpatient Plan of Care  Goal: Plan of Care Review  Outcome: Ongoing (interventions implemented as appropriate)  Pt AAOx4 and VSS. Pt is progressing with plan of care. Free of skin breakdown as the pt positioned/repositioned well independently. Clean, dry, and intact dressing noted on neck. SCDs in place at all times. Incentive spirometer at bedside and pt instructed on its use. No complain of pain at this time. Frequent rounds made to assess pain and safety and no complaints at this time noted. Side rails up x 2. Bed locked. Call light within reach. No falls noted. Will continue to monitor.

## 2019-03-31 NOTE — SUBJECTIVE & OBJECTIVE
Interval History: NAEON. Still on calcium gtt. Pain controlled, tolerating diet. Ambulating.    Medications:  Continuous Infusions:   custom IV infusion builder 30 mL/hr at 03/30/19 1927     Scheduled Meds:   calcitRIOL  1 mcg Oral BID    calcium carbonate  2,000 mg Oral QID    carvedilol  25 mg Oral BID    ergocalciferol  50,000 Units Oral Daily    famotidine  40 mg Oral QAM    hydrALAZINE  50 mg Oral Q8H    irbesartan  300 mg Oral QAM    lacosamide  50 mg Oral BID    levETIRAcetam  500 mg Oral BID    senna-docusate 8.6-50 mg  1 tablet Oral BID    tacrolimus  6 mg Oral BID    verapamil  240 mg Oral QHS     PRN Meds:sodium chloride, acetaminophen, acetaminophen-codeine 300-30mg, diphenhydrAMINE, ondansetron, oxyCODONE, oxyCODONE, promethazine (PHENERGAN) IVPB, ramelteon, sodium chloride 0.9%     Review of patient's allergies indicates:   Allergen Reactions    Chloral hydrate Hallucinations     Other reaction(s): Hallucinations  Other reaction(s): Hives    Hydrocodone Other (See Comments)     Mental status changes     Objective:     Vital Signs (Most Recent):  Temp: 96.2 °F (35.7 °C) (03/31/19 0419)  Pulse: 86 (03/31/19 0419)  Resp: 18 (03/31/19 0419)  BP: (!) 158/92 (03/31/19 0419)  SpO2: 99 % (03/31/19 0419) Vital Signs (24h Range):  Temp:  [96.2 °F (35.7 °C)-98.9 °F (37.2 °C)] 96.2 °F (35.7 °C)  Pulse:  [70-86] 86  Resp:  [18] 18  SpO2:  [99 %-100 %] 99 %  BP: (116-172)/(72-99) 158/92     Weight: 52.2 kg (115 lb 1.3 oz)  Body mass index is 19.15 kg/m².    Intake/Output - Last 3 Shifts       03/29 0700 - 03/30 0659 03/30 0700 - 03/31 0659 03/31 0700 - 04/01 0659    Other  350     Total Intake(mL/kg)  350 (6.7)     Other  3150     Total Output  3150     Net  -2800            Urine Occurrence 2 x            Physical Exam  NAD, resting well  RRR  Non labored respirations  Neck incision cdi, soft, no hematoma, non ttp  Voice strong, not hoarse, unchanged from pre-op  Extremities wwp      Significant  Labs:  BMP:   Recent Labs   Lab 03/30/19  0355 03/31/19  0555     --    *  --    K 4.3  --    CL 98  --    CO2 23  --    BUN 25*  --    CREATININE 7.3*  --    CALCIUM 7.0*  --    MG 2.0 2.1     CMP:   Recent Labs   Lab 03/30/19  0355      CALCIUM 7.0*   ALBUMIN 2.5*   PROT 6.8   *   K 4.3   CO2 23   CL 98   BUN 25*   CREATININE 7.3*   ALKPHOS 765*   ALT 7*   AST 29   BILITOT 0.6

## 2019-03-31 NOTE — SUBJECTIVE & OBJECTIVE
"Interval HPI:   Overnight events: Calcium improving with increased calcitriol and calcium carbonate. Remains on calcium gtt at 30/hr. Last ionized calcium 1.04; last corrected ~9.4. Mg 2.1; phos 2.6. Reports back pain related to sickle cell disease.    BP (!) 181/114 (BP Location: Right arm, Patient Position: Lying)   Pulse 86   Temp 97.6 °F (36.4 °C) (Oral)   Resp 18   Ht 5' 5" (1.651 m)   Wt 52.2 kg (115 lb 1.3 oz)   SpO2 99%   Breastfeeding? No   BMI 19.15 kg/m²     Labs Reviewed and Include    Recent Labs   Lab 03/31/19  0555   GLU 91   CALCIUM 8.4*   ALBUMIN 2.7*   *   K 4.5   CO2 24      BUN 20   CREATININE 5.2*     Lab Results   Component Value Date    WBC 3.33 (L) 03/30/2019    HGB 9.5 (L) 03/30/2019    HCT 29.5 (L) 03/30/2019    MCV 79 (L) 03/30/2019    PLT 76 (L) 03/30/2019     No results for input(s): TSH, FREET4 in the last 168 hours.  Lab Results   Component Value Date    HGBA1C 4.1 09/20/2018       Nutritional status:   Body mass index is 19.15 kg/m².  Lab Results   Component Value Date    ALBUMIN 2.7 (L) 03/31/2019    ALBUMIN 2.5 (L) 03/30/2019    ALBUMIN 2.8 (L) 03/29/2019     No results found for: PREALBUMIN    Estimated Creatinine Clearance: 13.3 mL/min (A) (based on SCr of 5.2 mg/dL (H)).    Accu-Checks  No results for input(s): POCTGLUCOSE in the last 72 hours.    Current Medications and/or Treatments Impacting Glycemic Control  Immunotherapy:    Immunosuppressants         Stop Route Frequency     tacrolimus capsule 6 mg      -- Oral 2 times daily        Steroids:   Hormones (From admission, onward)    None        Pressors:    Autonomic Drugs (From admission, onward)    None        Hyperglycemia/Diabetes Medications:   Antihyperglycemics (From admission, onward)    None        "

## 2019-03-31 NOTE — NURSING
Pt is on Tele. Frequently error show up. Pt asymptomatic. AAOx4. Changed leads, battery, and reset. Requested new box but no box available in the unit and tele war room. Whenever tele is available, will send it per tele war staff. Will continue to monitor.

## 2019-03-31 NOTE — ASSESSMENT & PLAN NOTE
27 y/o female with pmhx of liver txp, ESRD on HD MWF (anuric), seizures and HTN s/p subtotal parathyroidectomy for secondary hyperparathyroidism 3/27. 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     - Appreciate endocrinology assistance with hypocalcemia calcium; continues on calcium gtt  - continue q6h iCa checks  - continue 2g calcium PO TID, 0.5mg Rocaltrol BID  - appreciate nephrology assistance HD  - appreciate hepatology assistance with immunosuppressant medications  - regular diet  - prn pain medications  - ambulate, encourage IS  - home medications restarted; HTN well controlled  - ice to incision prn

## 2019-04-01 NOTE — TELEPHONE ENCOUNTER
OFELIA contacted pt's unit listed in her chart, [facility] and was informed that pt belongs to [facility] and provided the contact information for this unit. OFELIA spoke with Ciera Montejo, dialysis unit OFELIA who reports that she had never seen any of the previous forms for pt's adherence checks. OFELIA explained that in the chart, pt's unit is listed as [facility] and initially the information was going to that unit. OFELIA explained that OFELIA has requested that the information has been changed by pt's coordinator. Ms. Hendricks reports that she will complete the adherence form and send it immediately. OFELIA remains available to pt, pt's family, and transplant team at [phone].

## 2019-04-01 NOTE — PROGRESS NOTES
[facility]  General Surgery  Progress Note    Subjective:     Post-Op Info:  Procedure(s) (LRB):  PARATHYROIDECTOMY, Minimally Invasive Bilateral Exploration (Bilateral)   5 Days Post-Op     Interval History: Patient requiring prn IVP for HTN control. Blood pressure better controlled with addition of clonidine and increase in hydralazine. Still on calcium gtt this morning. Pain controlled, tolerating diet. Ambulating.    Medications:  Continuous Infusions:    Scheduled Meds:   calcitRIOL  1 mcg Oral BID    calcium carbonate  2,000 mg Oral TID WM    carvedilol  25 mg Oral BID    cloNIDine  0.1 mg Oral BID    ergocalciferol  50,000 Units Oral Daily    famotidine  40 mg Oral QAM    hydrALAZINE  100 mg Oral Q8H    irbesartan  300 mg Oral QAM    lacosamide  50 mg Oral BID    levETIRAcetam  500 mg Oral BID    senna-docusate 8.6-50 mg  1 tablet Oral BID    tacrolimus  6 mg Oral BID    verapamil  240 mg Oral QHS     PRN Meds:sodium chloride, acetaminophen, acetaminophen-codeine 300-30mg, diphenhydrAMINE, labetalol, ondansetron, oxyCODONE, promethazine, ramelteon, sodium chloride 0.9%     Review of patient's allergies indicates:   Allergen Reactions    Chloral hydrate Hallucinations     Other reaction(s): Hallucinations  Other reaction(s): Hives    Hydrocodone Other (See Comments)     Mental status changes     Objective:     Vital Signs (Most Recent):  Temp: 97.8 °F (36.6 °C) (03/31/19 2357)  Pulse: 80 (04/01/19 0440)  Resp: 18 (04/01/19 0440)  BP: (!) 152/109 (04/01/19 0440)  SpO2: 100 % (04/01/19 0440) Vital Signs (24h Range):  Temp:  [96.3 °F (35.7 °C)-97.8 °F (36.6 °C)] 97.8 °F (36.6 °C)  Pulse:  [77-87] 80  Resp:  [16-18] 18  SpO2:  [97 %-100 %] 100 %  BP: (135-200)/() 152/109     Weight: 52.2 kg (115 lb 1.3 oz)  Body mass index is 19.15 kg/m².    Intake/Output - Last 3 Shifts       03/30 0700 - 03/31 0659 03/31 0700 - 04/01 0659 04/01 0700 - 04/02 0659    P.O.  700     Other 350       Total Intake(mL/kg) 350 (6.7) 700 (13.4)     Other 3150      Total Output 3150      Net -2800 +700            Urine Occurrence  1 x           Physical Exam    NAD, resting well  Non labored respirations  Neck incision cdi, soft, non ttp  Voice strong  Extremities wwp    Significant Labs:  BMP:   Recent Labs   Lab 04/01/19  0400   GLU 95      K 5.0      CO2 23   BUN 29*   CREATININE 6.8*   CALCIUM 8.6*   MG 2.2     CMP:   Recent Labs   Lab 04/01/19  0400   GLU 95   CALCIUM 8.6*   ALBUMIN 2.6*   PROT 6.9      K 5.0   CO2 23      BUN 29*   CREATININE 6.8*   ALKPHOS 933*   ALT 10   AST 32   BILITOT 0.6     Assessment/Plan:     * Hyperparathyroidism due to end stage renal disease on dialysis  29 y/o female with pmhx of liver txp, ESRD on HD MWF (anuric), seizures and HTN s/p subtotal parathyroidectomy for secondary hyperparathyroidism 3/27. 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     - Endocrinology consulted for assistance with hypocalcemia; continues on calcium gtt, q8h iCa checks, calcitriol 1 mcg BID, calcium carbonate 2000 mg TID > QID. Will need to be off gtt for 24 hrs prior to discharge.  - Hold off on phos replacement unless <1.0 or developing muscle weakness, hold renvela for now unless phos becomes elevated  - appreciate nephrology assistance HD; next run tomorrow  - appreciate hepatology assistance with immunosuppressant medications  - regular diet, prn pain  - home HTN medications: minoxodil held, on hydralazine, carvedilol, irbesartan, verapamil. Clonodine added 3/31.  - ice to incision prn        Katie Lynch MD  General Surgery  [facility]

## 2019-04-01 NOTE — ASSESSMENT & PLAN NOTE
Due to hypoparathyroidism and hungry bone syndrome. Phos remaining low-normal.    Goal ionized calcium >0.90    Calcium gtt discontinued this morning - will continue to hold it today and see if she can maintain off the drip. Restart if ionized calcium drops below goal.  Primary requires 24h off drip prior to discharge.     Continue calcitriol 1 mcg BID  Continue calcium carbonate 2000 mg TID   Hold off on phos replacement unless <1.0 or developing muscle weakness.  Hold renvela for now unless phos becomes elevated    Counseled patient on symptoms of hypocalcemia to watch out for. Advised to notify nursing for any symptoms (perioral numbness, muscle cramps, tingling in fingers).    Discharge plans:  Will require calcitriol + calcium - doses TBD at time of discharge

## 2019-04-01 NOTE — SUBJECTIVE & OBJECTIVE
"Interval HPI:   Overnight events:  AF.  Hypertensive into the 200s SBP over last 24h.  Primary managing with IV doses of antihypertensive.  Most recent report as below.     Calcium continues to improve - corrects to 9.72 (iCa 1.0).  Mg 2.2; phos 2.6.  Calcium gtt discontinued this morning.      Continued pain related to reported sickle cell disease.    BP (!) 152/109 (Patient Position: Lying)   Pulse 85   Temp 97.8 °F (36.6 °C) (Oral)   Resp 18   Ht 5' 5" (1.651 m)   Wt 52.2 kg (115 lb 1.3 oz)   SpO2 100%   Breastfeeding? No   BMI 19.15 kg/m²     Labs Reviewed and Include    Recent Labs   Lab 04/01/19  0400   GLU 95   CALCIUM 8.6*   ALBUMIN 2.6*   PROT 6.9      K 5.0   CO2 23      BUN 29*   CREATININE 6.8*   ALKPHOS 933*   ALT 10   AST 32   BILITOT 0.6     Lab Results   Component Value Date    WBC 3.33 (L) 03/30/2019    HGB 9.5 (L) 03/30/2019    HCT 29.5 (L) 03/30/2019    MCV 79 (L) 03/30/2019    PLT 76 (L) 03/30/2019     No results for input(s): TSH, FREET4 in the last 168 hours.  Lab Results   Component Value Date    HGBA1C 4.1 09/20/2018     Lab Results   Component Value Date    MG 2.2 04/01/2019     Nutritional status:   Body mass index is 19.15 kg/m².  Lab Results   Component Value Date    ALBUMIN 2.6 (L) 04/01/2019    ALBUMIN 2.7 (L) 03/31/2019    ALBUMIN 2.5 (L) 03/30/2019     No results found for: PREALBUMIN    Estimated Creatinine Clearance: 10.2 mL/min (A) (based on SCr of 6.8 mg/dL (H)).    Accu-Checks  No results for input(s): POCTGLUCOSE in the last 72 hours.    Current Medications and/or Treatments Impacting Glycemic Control  Immunotherapy:    Immunosuppressants         Stop Route Frequency     tacrolimus capsule 6 mg      -- Oral Daily     tacrolimus capsule 5 mg      -- Oral Daily        Steroids:   Hormones (From admission, onward)    None        Pressors:    Autonomic Drugs (From admission, onward)    None        Hyperglycemia/Diabetes Medications:   Antihyperglycemics (From " admission, onward)    None

## 2019-04-01 NOTE — TELEPHONE ENCOUNTER
"----- Message from Oriana Roa sent at 4/1/2019  8:41 AM CDT -----  Contact: Ciera agudelo/ [employer] [place]  Needs Advice    Reason for call: Calling to speak with Tenisha regarding a fax        Communication Preference: [phone]     Additional Information: Ciera states she is bothered by the message from Tenisha stating that     is was a "third attempt" with the adherence form.     "

## 2019-04-01 NOTE — CARE UPDATE
Labelolol IVP given slowly over 5mins. B/p=176/118 and h/r =80. Immediately after. Night nurse Debni to continue to monitor b/p. Patient remains asymptomatic at this time and denies, pain or headache.

## 2019-04-01 NOTE — ANESTHESIA POSTPROCEDURE EVALUATION
Anesthesia Post Evaluation    Patient: Holly Patel    Procedure(s) Performed: Procedure(s) (LRB):  PARATHYROIDECTOMY, Minimally Invasive Bilateral Exploration (Bilateral)    Final Anesthesia Type: general  Patient location during evaluation: PACU  Patient participation: Yes- Able to Participate  Level of consciousness: awake and alert  Post-procedure vital signs: reviewed and stable  Pain management: adequate  Airway patency: patent  PONV status at discharge: No PONV  Anesthetic complications: no      Cardiovascular status: blood pressure returned to baseline  Respiratory status: unassisted, spontaneous ventilation and room air  Hydration status: euvolemic  Follow-up not needed.          Vitals Value Taken Time   /109 4/1/2019  4:40 AM   Temp 36.6 °C (97.8 °F) 3/31/2019 11:57 PM   Pulse 80 4/1/2019  4:40 AM   Resp 18 4/1/2019  4:40 AM   SpO2 100 % 4/1/2019  4:40 AM         Event Time     Out of Recovery 16:00:42          Pain/Bladimir Score: Pain Rating Prior to Med Admin: 0 (3/31/2019  8:35 PM)

## 2019-04-01 NOTE — PROGRESS NOTES
[facility]  Endocrinology  Progress Note    Admit Date: 3/27/2019     Reason for Consult: Hypocalcemia after subtotal parathyroidectomy for secondary hyperparathyroidism due to ESRD.    Surgical Procedure and Date: Subtotal parathyroidectomy (portion of L inferior gland left in situ) 3/27/2019    HPI:   Patient is a 28 y.o. 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 She was noted to be hypocalcemic after surgery, necessitating a continuous calcium infusion, calcitriol and PO calcium carbonate. We are consulted to assist with management. At the time of initial consult, she's received 2 doses of calcitriol (0.25 then 0.5 mcg), 4 doses of calcium carbonate (total 6000 mg), calcium gluconate x 2 IV, and ergo 50,000 IU. Trend of ionized calcium at the time of consult is below.     Ref. Range 9/26/2018 07:16 3/8/2019 13:40 3/27/2019 17:27 3/28/2019 05:27   PTH 9.0 - 77.0 pg/mL 1,429.0 (H) 2,396.0 (H) 87.0 (H) 107.0 (H)      Ref. Range 3/27/2019 20:28 3/27/2019 22:57 3/28/2019 01:22 3/28/2019 04:10 3/28/2019 04:10 3/28/2019 05:27 3/28/2019 08:11   Calcium, Ion 1.06 - 1.42 mmol/L 0.81 (L) 0.77 (L) 0.85 (L) 0.99 (L) 1.01 (L) 0.97 (L) 0.92 (L)      Ref. Range 3/12/2019 08:10 3/13/2019 05:31 3/27/2019 11:28 3/28/2019 05:27   Phosphorus 2.7 - 4.5 mg/dL 5.2 (H) 6.1 (H) 5.3 (H) 4.2       Interval HPI:   Overnight events:  AF.  Hypertensive into the 200s SBP over last 24h.  Primary managing with IV doses of antihypertensive.  Most recent report as below.     Calcium continues to improve - corrects to 9.72 (iCa 1.0).  Mg 2.2; phos 2.6.  Calcium gtt discontinued this morning.      Continued pain related to reported sickle cell disease.    BP (!) 152/109 (Patient Position: Lying)   Pulse 85   Temp 97.8 °F (36.6 °C)  "(Oral)   Resp 18   Ht 5' 5" (1.651 m)   Wt 52.2 kg (115 lb 1.3 oz)   SpO2 100%   Breastfeeding? No   BMI 19.15 kg/m²      Labs Reviewed and Include    Recent Labs   Lab 04/01/19  0400   GLU 95   CALCIUM 8.6*   ALBUMIN 2.6*   PROT 6.9      K 5.0   CO2 23      BUN 29*   CREATININE 6.8*   ALKPHOS 933*   ALT 10   AST 32   BILITOT 0.6     Lab Results   Component Value Date    WBC 3.33 (L) 03/30/2019    HGB 9.5 (L) 03/30/2019    HCT 29.5 (L) 03/30/2019    MCV 79 (L) 03/30/2019    PLT 76 (L) 03/30/2019     No results for input(s): TSH, FREET4 in the last 168 hours.  Lab Results   Component Value Date    HGBA1C 4.1 09/20/2018     Lab Results   Component Value Date    MG 2.2 04/01/2019     Nutritional status:   Body mass index is 19.15 kg/m².  Lab Results   Component Value Date    ALBUMIN 2.6 (L) 04/01/2019    ALBUMIN 2.7 (L) 03/31/2019    ALBUMIN 2.5 (L) 03/30/2019     No results found for: PREALBUMIN    Estimated Creatinine Clearance: 10.2 mL/min (A) (based on SCr of 6.8 mg/dL (H)).    Accu-Checks  No results for input(s): POCTGLUCOSE in the last 72 hours.    Current Medications and/or Treatments Impacting Glycemic Control  Immunotherapy:    Immunosuppressants         Stop Route Frequency     tacrolimus capsule 6 mg      -- Oral Daily     tacrolimus capsule 5 mg      -- Oral Daily        Steroids:   Hormones (From admission, onward)    None        Pressors:    Autonomic Drugs (From admission, onward)    None        Hyperglycemia/Diabetes Medications:   Antihyperglycemics (From admission, onward)    None          ASSESSMENT and PLAN    * Hyperparathyroidism due to end stage renal disease on dialysis  S/p subtotal parathyroidectomy    Hypophosphatemia  Monitor q6. Don't replace unless <1.0 to prevent worsening of hypocalcemia.    Hypocalcemia  Due to hungry bone syndrome (high pre surgery alk phos and low post surg phos) . Phos remaining low-normal.    Goal ionized calcium >0.90    Calcium gtt discontinued " this morning - will continue to hold it today and see if she can maintain off the drip. Restart if ionized calcium drops below goal.  Primary requires 24h off drip prior to discharge.     Continue calcitriol 1 mcg BID  Continue calcium carbonate 2000 mg TID   Hold off on phos replacement unless <1.0 or developing muscle weakness.  Hold renvela for now unless phos becomes elevated    Counseled patient on symptoms of hypocalcemia to watch out for. Advised to notify nursing for any symptoms (perioral numbness, muscle cramps, tingling in fingers).    Discharge plans:  Will require calcitriol + calcium - doses TBD at time of discharge    ESRD on dialysis  Can help regulate calcium levels with dialysis.          Loretta Poole MD  Endocrinology  [facility], Gita Stanton MD,  have personally taken the history and examined the patient and agree with the resident's note as stated above.

## 2019-04-01 NOTE — ASSESSMENT & PLAN NOTE
27 y/o female with pmhx of liver txp, ESRD on HD MWF (anuric), seizures and HTN s/p subtotal parathyroidectomy for secondary hyperparathyroidism 3/27. 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     - Endocrinology consulted for assistance with hypocalcemia; continues on calcium gtt, q8h iCa checks, calcitriol 1 mcg BID, calcium carbonate 2000 mg TID > QID. Will need to be off gtt for 24 hrs prior to discharge.  - Hold off on phos replacement unless <1.0 or developing muscle weakness, hold renvela for now unless phos becomes elevated  - appreciate nephrology assistance HD; next run tomorrow  - appreciate hepatology assistance with immunosuppressant medications  - regular diet, prn pain  - home HTN medications: minoxodil held, on hydralazine, carvedilol, irbesartan, verapamil. Clonodine added 3/31.  - ice to incision prn

## 2019-04-01 NOTE — TREATMENT PLAN
Hepatology Treatment Plan    Holly Patel is a 28 y.o. female admitted to hospital 3/27/2019 (Hospital Day: 6) due to Hyperparathyroidism due to end stage renal disease on dialysis.     Interval History  - tacrolimus level 8.7 (6.2 on 3/30)    Laboratory    Lab Results   Component Value Date    WBC 3.33 (L) 03/30/2019    HGB 9.5 (L) 03/30/2019    HCT 29.5 (L) 03/30/2019    MCV 79 (L) 03/30/2019    PLT 76 (L) 03/30/2019       Lab Results   Component Value Date     04/01/2019    K 5.0 04/01/2019     04/01/2019    CO2 23 04/01/2019    BUN 29 (H) 04/01/2019    CREATININE 6.8 (H) 04/01/2019    CALCIUM 8.6 (L) 04/01/2019    ANIONGAP 11 04/01/2019    ESTGFRAFRICA 8.7 (A) 04/01/2019    EGFRNONAA 7.6 (A) 04/01/2019       Lab Results   Component Value Date    ALBUMIN 2.6 (L) 04/01/2019    ALT 10 04/01/2019    AST 32 04/01/2019     (H) 06/27/2012    ALKPHOS 933 (H) 04/01/2019    BILITOT 0.6 04/01/2019    BILITOT 0.6 03/30/2019    BILITOT 0.7 03/29/2019       Lab Results   Component Value Date    INR 1.1 03/30/2019    INR 1.1 03/29/2019    INR 1.1 03/28/2019       Lab Results   Component Value Date    TACROLIMUS 8.7 04/01/2019    TACROLIMUS 6.2 03/30/2019    TACROLIMUS 6.3 03/28/2019       MELD-Na score: 23 at 3/31/2019  5:55 AM  MELD score: 21 at 3/31/2019  5:55 AM  Calculated from:  Serum Creatinine: On dialysis. Using 4 mg/dL.  Serum Sodium: 134 mmol/L at 3/31/2019  5:55 AM  Total Bilirubin: 0.6 mg/dL (Rounded to 1 mg/dL) at 3/30/2019  3:55 AM  INR(ratio): 1.1 at 3/30/2019  3:55 AM  Age: 28 years    Plan  - will decrease tacrolimus to 6mg qAM, 5mg qPM  - goal trough 6-8  - please obtain daily CBC, BMP, LFT, INR , tacrolimus trough    Thank you for involving us in the care of Michaelkobetasia Patel. Please call with any additional concerns or questions.    Dylan Phelps MD  Gastroenterology Fellow, PGY IV  Pager: [phone]

## 2019-04-01 NOTE — SUBJECTIVE & OBJECTIVE
Interval History: Patient requiring prn IVP for HTN control. Blood pressure better controlled with addition of clonidine and increase in hydralazine. Still on calcium gtt this morning. Pain controlled, tolerating diet. Ambulating.    Medications:  Continuous Infusions:    Scheduled Meds:   calcitRIOL  1 mcg Oral BID    calcium carbonate  2,000 mg Oral TID WM    carvedilol  25 mg Oral BID    cloNIDine  0.1 mg Oral BID    ergocalciferol  50,000 Units Oral Daily    famotidine  40 mg Oral QAM    hydrALAZINE  100 mg Oral Q8H    irbesartan  300 mg Oral QAM    lacosamide  50 mg Oral BID    levETIRAcetam  500 mg Oral BID    senna-docusate 8.6-50 mg  1 tablet Oral BID    tacrolimus  6 mg Oral BID    verapamil  240 mg Oral QHS     PRN Meds:sodium chloride, acetaminophen, acetaminophen-codeine 300-30mg, diphenhydrAMINE, labetalol, ondansetron, oxyCODONE, promethazine, ramelteon, sodium chloride 0.9%     Review of patient's allergies indicates:   Allergen Reactions    Chloral hydrate Hallucinations     Other reaction(s): Hallucinations  Other reaction(s): Hives    Hydrocodone Other (See Comments)     Mental status changes     Objective:     Vital Signs (Most Recent):  Temp: 97.8 °F (36.6 °C) (03/31/19 2357)  Pulse: 80 (04/01/19 0440)  Resp: 18 (04/01/19 0440)  BP: (!) 152/109 (04/01/19 0440)  SpO2: 100 % (04/01/19 0440) Vital Signs (24h Range):  Temp:  [96.3 °F (35.7 °C)-97.8 °F (36.6 °C)] 97.8 °F (36.6 °C)  Pulse:  [77-87] 80  Resp:  [16-18] 18  SpO2:  [97 %-100 %] 100 %  BP: (135-200)/() 152/109     Weight: 52.2 kg (115 lb 1.3 oz)  Body mass index is 19.15 kg/m².    Intake/Output - Last 3 Shifts       03/30 0700 - 03/31 0659 03/31 0700 - 04/01 0659 04/01 0700 - 04/02 0659    P.O.  700     Other 350      Total Intake(mL/kg) 350 (6.7) 700 (13.4)     Other 3150      Total Output 3150      Net -2800 +700            Urine Occurrence  1 x           Physical Exam    NAD, resting well  Non labored  respirations  Neck incision cdi, soft, non ttp  Voice strong  Extremities wwp    Significant Labs:  BMP:   Recent Labs   Lab 04/01/19  0400   GLU 95      K 5.0      CO2 23   BUN 29*   CREATININE 6.8*   CALCIUM 8.6*   MG 2.2     CMP:   Recent Labs   Lab 04/01/19  0400   GLU 95   CALCIUM 8.6*   ALBUMIN 2.6*   PROT 6.9      K 5.0   CO2 23      BUN 29*   CREATININE 6.8*   ALKPHOS 933*   ALT 10   AST 32   BILITOT 0.6

## 2019-04-02 NOTE — PROGRESS NOTES
Reviewed record to see if pt had been discharged to call post-operatively to see how she's doing, to ensure she have her post-op appt info and will have post-op lab work drawn. Pt is still admitted.

## 2019-04-02 NOTE — DISCHARGE SUMMARY
[facility]  General Surgery  Discharge Summary      Patient Name: Holly Patel  MRN: [id number]  Admission Date: 3/27/2019  Hospital Length of Stay: 5 days  Discharge Date and Time:  04/02/2019 5:52 PM  Attending Physician: Ashley Guallpa MD   Discharging Provider: Katie Lynch MD  Primary Care Provider: Stan Sosa MD    HPI:   28 y.o. female who was referred by Dr. Viktor Bass and presents for evaluation of secondary hyperparathyroidism.     Of note, the patient had a liver transplant at 1 year of age (1992) for hemangioendothelioma. Per their report the patient was noted to have severe HTN resulting in ESRD starting on HD in 2015. Currently undergoes HD days MWF at [facility].  The patient has had complaints of hyperparathyroidism. There is no history of lithium or thiazide medication use. She has not had previous history of head or neck radiation. She denies sleep disturbance, joint pain, abdominal pain, weakness and increased thirst. Furthermore, there is no history of pathologic fractures, malignancy, or personal history of thyroid, adrenal, or pancreatic abnormalities. Unknown current Vitamin D status. She does not have familial history of endocrinopathies.     Takes Sensipar 30mg daily on .  No dose change following recent lab(PTH) draw.    Subtotal parathyroidectomy was recommended for parathyroid hyperplasia.     Procedure(s) (LRB):  PARATHYROIDECTOMY, Minimally Invasive Bilateral Exploration (Bilateral)      Indwelling Lines/Drains at time of discharge:   Lines/Drains/Airways     Drain                 Hemodialysis AV Fistula Left forearm -- days              Hospital Course:  (synopsis of major diagnoses, care, treatment, and services provided during the course of the hospital stay): The patient underwent a PARATHYROIDECTOMY minimally invasive (N/A) on 5/31/2016. On the evening of surgery, the patient's ionized calcium was low at 0.77 and PTH was 87.  Endocrine was consulted for assistance with calcium POD 1 for assistance with calcium gtt initiated overnight POD 0 for hungry bone. The patient did not have symptoms of hypocalcemia. She was weaned off the calcium gtt by POD5. Patient is taking calcium supplementation: Calcium Carbonate 2000 mg three times daily.. The patient was started on Calcitriol supplementation, 1 mcg BID. The patient did not have a drain placed at surgery. She had some issues with hypertension with SBP in the 200's. She was started on clonidine 0.1 mg BID with improved control, her PCP to adjust as outpatient. At the time of discharge, the patient's pain was well-controlled.  She had voided, was ambulatory, and tolerating a diet. She will follow-up with nephrology in 2 weeks with labs and continue her HD with monitoring of her calcium at dialysis.     Consults:   Consults (From admission, onward)        Status Ordering Provider     Inpatient consult to Endocrinology  Once     Provider:  (Not yet assigned)    Completed GERMÁN ALARCON     Inpatient consult to Nephrology  Once     Provider:  (Not yet assigned)    Completed DARLENE LEA        Significant Diagnostic Studies:   Specimen (12h ago, onward)    None        Pending Diagnostic Studies:     Procedure Component Value Units Date/Time    Basic metabolic panel [[id number]]     Order Status:  Sent Lab Status:  No result     Specimen:  Blood     Calcium, ionized [[id number]]     Order Status:  Sent Lab Status:  No result     Specimen:  Blood     Calcium, ionized [[id number]] Collected:  03/30/19 0355    Order Status:  Sent Lab Status:  In process Updated:  03/30/19 0355    Specimen:  Blood     Calcium, ionized [[id number]] Collected:  03/28/19 2321    Order Status:  Sent Lab Status:  In process Updated:  03/28/19 2321    Specimen:  Blood     Phosphorus [[id number]]     Order Status:  Sent Lab Status:  No result     Specimen:  Blood     Potassium [[id number]]     Order Status:   Sent Lab Status:  No result     Specimen:  Blood         Final Active Diagnoses:    Diagnosis Date Noted POA    PRINCIPAL PROBLEM:  Hyperparathyroidism due to end stage renal disease on dialysis [N25.81, N18.6, Z99.2] 03/27/2019 Not Applicable    Hypocalcemia [E83.51] 03/28/2019 Unknown    Hypophosphatemia [E83.39] 03/28/2019 Unknown    Hyperparathyroidism [E21.3] 03/27/2019 Yes    Chronic kidney disease-mineral and bone disorder [N18.9, E83.9, M89.9] 10/05/2018 Yes    ESRD on dialysis [N18.6, Z99.2]  Not Applicable    Anemia in ESRD (end-stage renal disease) [N18.6, D63.1] 10/12/2015 Yes     Chronic      Problems Resolved During this Admission:      Discharged Condition: good    Disposition: Home or Self Care    Follow Up:  Follow-up Information     Ashley Guallpa MD In 1 week.    Specialty:  General Surgery  Why:  s/p subtotal parathyroidectomy  Contact information:  [address] [id number] [phone]             Viktor Bass MD In 2 weeks.    Specialty:  Nephrology  Why:  s/p subtotal parathyroidectomy  Contact information:  [address]  [place] [id number] [phone]             Stan Sosa MD In 1 week.    Specialty:  Internal Medicine  Why:  adjust HTN medications  Contact information:  [address]  [place] [id number] [phone]                 Patient Instructions:      BASIC METABOLIC PANEL   Standing Status: Future Standing Exp. Date: 05/31/20     Magnesium   Standing Status: Future Standing Exp. Date: 05/31/20     Notify your health care provider if you experience any of the following:  temperature >100.4     Notify your health care provider if you experience any of the following:  persistent nausea and vomiting or diarrhea     Notify your health care provider if you experience any of the following:  severe uncontrolled pain     Notify your health care provider if you experience any of the following:  redness, tenderness, or signs of  infection (pain, swelling, redness, odor or green/yellow discharge around incision site)     Notify your health care provider if you experience any of the following:  difficulty breathing or increased cough     Notify your health care provider if you experience any of the following:  severe persistent headache     Notify your health care provider if you experience any of the following:  worsening rash     Notify your health care provider if you experience any of the following:  persistent dizziness, light-headedness, or visual disturbances     Notify your health care provider if you experience any of the following:  increased confusion or weakness     Medications:  Reconciled Home Medications:      Medication List      START taking these medications    calcium carbonate 500 mg calcium (1,250 mg) tablet  Commonly known as:  OS-WOLFGANG  Take 4 tablets (2,000 mg total) by mouth 3 (three) times daily with meals.     cholecalciferol (vitamin D3) 1,000 unit capsule  Commonly known as:  VITAMIN D3  Take 2 capsules (2,000 Units total) by mouth once daily.     cloNIDine 0.1 MG tablet  Commonly known as:  CATAPRES  Take 1 tablet (0.1 mg total) by mouth 2 (two) times daily.        CHANGE how you take these medications    irbesartan 300 MG tablet  Commonly known as:  AVAPRO  Take 1 tablet (300 mg total) by mouth once daily.  What changed:  when to take this        CONTINUE taking these medications    carvedilol 25 MG tablet  Commonly known as:  COREG  Take 1 tablet (25 mg total) by mouth 2 (two) times daily.     famotidine 40 MG tablet  Commonly known as:  PEPCID  Take 1 tablet (40 mg total) by mouth every morning.     hydrALAZINE 100 MG tablet  Commonly known as:  APRESOLINE  Take 1 tablet (100 mg total) by mouth every 8 (eight) hours.     lacosamide 50 mg Tab  Commonly known as:  VIMPAT  Take 1 tablet (50 mg total) by mouth 2 (two) times daily.     levETIRAcetam 500 MG Tab  Commonly known as:  KEPPRA  Take 1 tablet (500 mg total)  by mouth 2 (two) times daily.     tacrolimus 1 MG Cap  Commonly known as:  PROGRAF  Take 6 capsules (6 mg total) by mouth every 12 (twelve) hours.     verapamil 240 MG CR tablet  Commonly known as:  CALAN-SR  Take 1 tablet (240 mg total) by mouth every evening.        STOP taking these medications    calcitRIOL 0.5 MCG Cap  Commonly known as:  ROCALTROL     cinacalcet 30 MG Tab  Commonly known as:  SENSIPAR     minoxidil 2.5 MG tablet  Commonly known as:  LONITEN     sevelamer carbonate 800 mg Tab  Commonly known as:  RENVELA        ASK your doctor about these medications    triamcinolone acetonide 0.1% 0.1 % ointment  Commonly known as:  KENALOG  AAA on arms, legs, and neck bid x 1-2 wks then prn flares only          Time spent on the discharge of patient: 10 minutes    Katie Lynch MD  General Surgery  [facility]

## 2019-04-02 NOTE — PROGRESS NOTES
"[facility]  Endocrinology  Progress Note    Admit Date: 3/27/2019     Reason for Consult: Hypocalcemia after subtotal parathyroidectomy for secondary hyperparathyroidism due to ESRD.    Surgical Procedure and Date: Subtotal parathyroidectomy (portion of L inferior gland left in situ) 3/27/2019    HPI:   Patient is a 28 y.o. 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 She was noted to be hypocalcemic after surgery, necessitating a continuous calcium infusion, calcitriol and PO calcium carbonate. We are consulted to assist with management. At the time of initial consult, she's received 2 doses of calcitriol (0.25 then 0.5 mcg), 4 doses of calcium carbonate (total 6000 mg), calcium gluconate x 2 IV, and ergo 50,000 IU. Trend of ionized calcium at the time of consult is below.     Ref. Range 9/26/2018 07:16 3/8/2019 13:40 3/27/2019 17:27 3/28/2019 05:27   PTH 9.0 - 77.0 pg/mL 1,429.0 (H) 2,396.0 (H) 87.0 (H) 107.0 (H)      Ref. Range 3/27/2019 20:28 3/27/2019 22:57 3/28/2019 01:22 3/28/2019 04:10 3/28/2019 04:10 3/28/2019 05:27 3/28/2019 08:11   Calcium, Ion 1.06 - 1.42 mmol/L 0.81 (L) 0.77 (L) 0.85 (L) 0.99 (L) 1.01 (L) 0.97 (L) 0.92 (L)      Ref. Range 3/12/2019 08:10 3/13/2019 05:31 3/27/2019 11:28 3/28/2019 05:27   Phosphorus 2.7 - 4.5 mg/dL 5.2 (H) 6.1 (H) 5.3 (H) 4.2       Interval HPI:   Overnight events:  AFVSS.  Serum calcium corrects to 9.2.  Patient remains off drip.    Mg 2.3, phos 2.6.  Patient improved.    /82 (BP Location: Right arm, Patient Position: Lying)   Pulse 75   Temp 97.6 °F (36.4 °C) (Oral)   Resp 14   Ht 5' 5" (1.651 m)   Wt 52.2 kg (115 lb 1.3 oz)   SpO2 100%   Breastfeeding? No   BMI 19.15 kg/m²      Labs Reviewed and Include    Recent Labs   Lab 04/02/19  0520 " 04/02/19  0800    106   CALCIUM 8.2* 8.4*   ALBUMIN 2.7*  --    PROT 7.3  --     137   K 5.8* 5.0  5.0   CO2 22* 23    103   BUN 39* 32*   CREATININE 8.5* 6.9*   ALKPHOS 1,050*  --    ALT 15  --    AST 45*  --    BILITOT 0.7  --      Lab Results   Component Value Date    WBC 3.33 (L) 03/30/2019    HGB 9.5 (L) 03/30/2019    HCT 29.5 (L) 03/30/2019    MCV 79 (L) 03/30/2019    PLT 76 (L) 03/30/2019     No results for input(s): TSH, FREET4 in the last 168 hours.  Lab Results   Component Value Date    HGBA1C 4.1 09/20/2018       Nutritional status:   Body mass index is 19.15 kg/m².  Lab Results   Component Value Date    ALBUMIN 2.7 (L) 04/02/2019    ALBUMIN 2.6 (L) 04/01/2019    ALBUMIN 2.7 (L) 03/31/2019     No results found for: PREALBUMIN    Estimated Creatinine Clearance: 10 mL/min (A) (based on SCr of 6.9 mg/dL (H)).    Accu-Checks  No results for input(s): POCTGLUCOSE in the last 72 hours.    Current Medications and/or Treatments Impacting Glycemic Control  Immunotherapy:    Immunosuppressants         Stop Route Frequency     tacrolimus capsule 6 mg      -- Oral Daily     tacrolimus capsule 5 mg      -- Oral Daily        Steroids:   Hormones (From admission, onward)    None        Pressors:    Autonomic Drugs (From admission, onward)    None        Hyperglycemia/Diabetes Medications:   Antihyperglycemics (From admission, onward)    Start     Stop Route Frequency Ordered    04/02/19 0815  insulin regular injection 10 Units  (Dextrose 50% and Insulin with normal renal function (for patients with blood glucose < 250 mg/dL))      -- IV Once 04/02/19 0710          ASSESSMENT and PLAN    * Hyperparathyroidism due to end stage renal disease on dialysis  S/p subtotal parathyroidectomy    Hypophosphatemia  Stable.  Don't replace unless <1.0 to prevent worsening of hypocalcemia.    Hypocalcemia  Due to hungry bone syndrome.   Phos remaining low-normal.    Goal ionized calcium >0.90    Calcium gtt remains  discontinued this morning - calcium levels remained at goal.  Primary requires 24h off drip prior to discharge - which patient has completed.    Continue calcitriol 1 mcg BID  Continue calcium carbonate 2000 mg TID   Hold off on phos replacement unless <1.0 or developing muscle weakness.  Hold renvela for now unless phos becomes elevated      Discharge plans:  Continue calcitriol 1 mcg BID  Continue calcium carbonate 2000 mg TID   Vitamin D3 2000 iU daily  Renal panel + Mg one week post discharge  Follow up with nephrology in outpatient setting for further management - recommend follow up in 1-2 weeks.     ESRD on dialysis  Can help regulate calcium levels with dialysis.        ADDENDUM:  SCa 9.7 (no albumin), iCa 1.11.  Recommend decreasing discharge medications to:  Calcitriol 1mcg BID  Calcium carbonate 1000mg TID with meals  Continue vitamin D3 2000iU daily  Renal panel +Mg on Friday.   Keep follow up with nephrology in outpatient setting for further management - recommend follow up 1-2 weeks.      Loretta Poole MD  Endocrinology  [facility]

## 2019-04-02 NOTE — PROGRESS NOTES
[facility]  General Surgery  Progress Note    Subjective:     Post-Op Info:  Procedure(s) (LRB):  PARATHYROIDECTOMY, Minimally Invasive Bilateral Exploration (Bilateral)   6 Days Post-Op     Interval History: No acute events. Blood pressure well controlled with addition of clonidine. Off calcium gtt since 0730 yesterday. Maintaining iCa >0.9.    Medications:  Continuous Infusions:    Scheduled Meds:   sodium chloride 0.9%   Intravenous Once    albuterol sulfate  10 mg Nebulization Once    calcitRIOL  1 mcg Oral BID    calcium carbonate  2,000 mg Oral TID WM    carvedilol  25 mg Oral BID    cloNIDine  0.1 mg Oral BID    dextrose 50%  25 g Intravenous Once    And    insulin regular  10 Units Intravenous Once    famotidine  40 mg Oral QAM    hydrALAZINE  100 mg Oral Q8H    irbesartan  300 mg Oral QAM    lacosamide  50 mg Oral BID    levETIRAcetam  500 mg Oral BID    senna-docusate 8.6-50 mg  1 tablet Oral BID    tacrolimus  5 mg Oral Daily    tacrolimus  6 mg Oral Daily    verapamil  240 mg Oral QHS     PRN Meds:sodium chloride, acetaminophen, acetaminophen-codeine 300-30mg, dextrose 50% **AND** dextrose 50% **AND** insulin regular, diphenhydrAMINE, labetalol, ondansetron, promethazine, ramelteon, sodium chloride 0.9%     Review of patient's allergies indicates:   Allergen Reactions    Chloral hydrate Hallucinations     Other reaction(s): Hallucinations  Other reaction(s): Hives    Hydrocodone Other (See Comments)     Mental status changes     Objective:     Vital Signs (Most Recent):  Temp: 97.6 °F (36.4 °C) (04/02/19 0707)  Pulse: 81 (04/02/19 0707)  Resp: 18 (04/02/19 0707)  BP: 99/66 (04/02/19 0707)  SpO2: 99 % (04/02/19 0707) Vital Signs (24h Range):  Temp:  [96.1 °F (35.6 °C)-98.2 °F (36.8 °C)] 97.6 °F (36.4 °C)  Pulse:  [76-85] 81  Resp:  [17-18] 18  SpO2:  [97 %-100 %] 99 %  BP: ()/() 99/66     Weight: 52.2 kg (115 lb 1.3 oz)  Body mass index is 19.15  kg/m².    Intake/Output - Last 3 Shifts       03/31 0700 - 04/01 0659 04/01 0700 - 04/02 0659 04/02 0700 - 04/03 0659    P.O. 700      Other       Total Intake(mL/kg) 700 (13.4)      Other       Total Output       Net +700             Urine Occurrence 1 x            Physical Exam    NAD, resting well  Non labored respirations  Neck incision cdi, soft, non ttp  Voice strong  Extremities wwp    Significant Labs:  BMP:   Recent Labs   Lab 04/02/19  0520         K 5.8*      CO2 22*   BUN 39*   CREATININE 8.5*   CALCIUM 8.2*   MG 2.3     CMP:   Recent Labs   Lab 04/02/19  0520      CALCIUM 8.2*   ALBUMIN 2.7*   PROT 7.3      K 5.8*   CO2 22*      BUN 39*   CREATININE 8.5*   ALKPHOS 1,050*   ALT 15   AST 45*   BILITOT 0.7     Assessment/Plan:     * Hyperparathyroidism due to end stage renal disease on dialysis  27 y/o female with pmhx of liver txp, ESRD on HD MWF (anuric), seizures and HTN s/p subtotal parathyroidectomy for secondary hyperparathyroidism 3/27. 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     - Endocrinology consulted for assistance with hypocalcemia; off calcium gtt since 4/1, q8h iCa checks, calcitriol 1 mcg BID, calcium carbonate 2000 mg TID > QID. Will need to be off gtt for 24 hrs prior to discharge.  - Hold off on phos replacement unless <1.0 or developing muscle weakness, hold renvela for now unless phos becomes elevated  - appreciate nephrology assistance HD  - appreciate hepatology assistance with immunosuppressant medications  - regular diet, prn pain  - home HTN medications: minoxodil held, on hydralazine, carvedilol, irbesartan, verapamil. Clonodine added 3/31.  - ice to incision prn        Katie Lynch MD  General Surgery  [facility]

## 2019-04-02 NOTE — PROGRESS NOTES
[facility] NOTE    Holly Patel is a 28 y.o. female currently on hemodialysis for removal of uremic toxins and volume.    Labs have been reviewed and the dialysate bath has been adjusted.    There are no symptoms of hypotension, chest pain, dyspnea, nausea or vomiting.    Noted, pt has gained around 3 kilos of weight above her EDW    Exam  Gen taking a nap  Wearing supplemental oxygen  Lungs no crackles  Access LUE  No edema    Labs:      Recent Labs   Lab 04/01/19  0400 04/01/19  1058 04/01/19  1901 04/02/19  0520 04/02/19  0800     --   --  136 137   K 5.0  --   --  5.8* 5.0  5.0     --   --  102 103   CO2 23  --   --  22* 23   BUN 29*  --   --  39* 32*   CREATININE 6.8*  --   --  8.5* 6.9*   CALCIUM 8.6*  --   --  8.2* 8.4*   PHOS 2.6* 2.6* 2.6* 2.6*  --        Recent Labs   Lab 03/28/19  0527 03/29/19  0546 03/30/19  0355   WBC 5.74 4.22 3.33*   HGB 10.1* 9.9* 9.5*   HCT 31.5* 31.1* 29.5*   PLT 75* 82* 76*       Assessment/Plan:    ESRD    HD today for removal of uremic toxins and volume. Dialysate bath adjusted per labs. Attempting to increase UF rate as tolerated to address her fluid weight gain. Pt needs to restrict salt and fluid intake.    Calcium levels being monitored. Endocrine recommendations reviewed.

## 2019-04-02 NOTE — SUBJECTIVE & OBJECTIVE
"Interval HPI:   Overnight events:  AFVSS.  Serum calcium corrects to 9.2.  Patient remains off drip.    Mg 2.3, phos 2.6.  Patient improved.    /82 (BP Location: Right arm, Patient Position: Lying)   Pulse 75   Temp 97.6 °F (36.4 °C) (Oral)   Resp 14   Ht 5' 5" (1.651 m)   Wt 52.2 kg (115 lb 1.3 oz)   SpO2 100%   Breastfeeding? No   BMI 19.15 kg/m²     Labs Reviewed and Include    Recent Labs   Lab 04/02/19  0520 04/02/19  0800    106   CALCIUM 8.2* 8.4*   ALBUMIN 2.7*  --    PROT 7.3  --     137   K 5.8* 5.0  5.0   CO2 22* 23    103   BUN 39* 32*   CREATININE 8.5* 6.9*   ALKPHOS 1,050*  --    ALT 15  --    AST 45*  --    BILITOT 0.7  --      Lab Results   Component Value Date    WBC 3.33 (L) 03/30/2019    HGB 9.5 (L) 03/30/2019    HCT 29.5 (L) 03/30/2019    MCV 79 (L) 03/30/2019    PLT 76 (L) 03/30/2019     No results for input(s): TSH, FREET4 in the last 168 hours.  Lab Results   Component Value Date    HGBA1C 4.1 09/20/2018       Nutritional status:   Body mass index is 19.15 kg/m².  Lab Results   Component Value Date    ALBUMIN 2.7 (L) 04/02/2019    ALBUMIN 2.6 (L) 04/01/2019    ALBUMIN 2.7 (L) 03/31/2019     No results found for: PREALBUMIN    Estimated Creatinine Clearance: 10 mL/min (A) (based on SCr of 6.9 mg/dL (H)).    Accu-Checks  No results for input(s): POCTGLUCOSE in the last 72 hours.    Current Medications and/or Treatments Impacting Glycemic Control  Immunotherapy:    Immunosuppressants         Stop Route Frequency     tacrolimus capsule 6 mg      -- Oral Daily     tacrolimus capsule 5 mg      -- Oral Daily        Steroids:   Hormones (From admission, onward)    None        Pressors:    Autonomic Drugs (From admission, onward)    None        Hyperglycemia/Diabetes Medications:   Antihyperglycemics (From admission, onward)    Start     Stop Route Frequency Ordered    04/02/19 0815  insulin regular injection 10 Units  (Dextrose 50% and Insulin with normal renal " function (for patients with blood glucose < 250 mg/dL))      -- IV Once 04/02/19 9108

## 2019-04-02 NOTE — ASSESSMENT & PLAN NOTE
Due to hungry bone syndrome.   Phos remaining low-normal.    Goal ionized calcium >0.90    Calcium gtt remains discontinued this morning - calcium levels remained at goal.  Primary requires 24h off drip prior to discharge - which patient has completed.    Continue calcitriol 1 mcg BID  Continue calcium carbonate 2000 mg TID   Hold off on phos replacement unless <1.0 or developing muscle weakness.  Hold renvela for now unless phos becomes elevated      Discharge plans:  Continue calcitriol 1 mcg BID  Continue calcium carbonate 2000 mg TID   Vitamin D3 2000 iU daily  Renal panel + Mg one week post discharge  Follow up with nephrology in outpatient setting for further management - recommend follow up in 1-2 weeks.

## 2019-04-02 NOTE — PLAN OF CARE
Problem: Fluid Volume Excess (Chronic Kidney Disease)  Goal: Fluid Balance  Outcome: Outcome(s) achieved Date Met: 04/02/19  UFG 3.0L during IHD.

## 2019-04-02 NOTE — ASSESSMENT & PLAN NOTE
27 y/o female with pmhx of liver txp, ESRD on HD MWF (anuric), seizures and HTN s/p subtotal parathyroidectomy for secondary hyperparathyroidism 3/27. 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     - Endocrinology consulted for assistance with hypocalcemia; off calcium gtt since 4/1, q8h iCa checks, calcitriol 1 mcg BID, calcium carbonate 2000 mg TID > QID. Will need to be off gtt for 24 hrs prior to discharge.  - Hold off on phos replacement unless <1.0 or developing muscle weakness, hold renvela for now unless phos becomes elevated  - appreciate nephrology assistance HD  - appreciate hepatology assistance with immunosuppressant medications  - regular diet, prn pain  - home HTN medications: minoxodil held, on hydralazine, carvedilol, irbesartan, verapamil. Clonodine added 3/31.  - ice to incision prn

## 2019-04-02 NOTE — PROGRESS NOTES
IHD completed, blood returned. Pt alert & stable. Thrill/bruit noted, hemostasis 10 minutes, dry occlusive dressing to sites. Duration 3.5hrs, Qb 400ml/min, Qd 800ml/min, UFG 3.7L. Report called. Pt transported to [facility] via stretcher with tele.

## 2019-04-02 NOTE — PROGRESS NOTES
Pt arrived to [facility] via stretcher. HD T,TH,S, maintenance pt. Pt alert & stable. Thrill/bruit noted, accessed LT AVF X2 15G BH needles without difficulty. Duration 3.5hrs, Qb 400ml/min, Qd 800ml/min, planned UFG 2.5hrs, orders reviewed.

## 2019-04-03 NOTE — TELEPHONE ENCOUNTER
Reason for Disposition   Constipation   Unable to have a bowel movement (BM) without manually removing stool (using finger to pull out stool or perform disimpaction)    Protocols used: POST-OP SYMPTOMS AND [id number], CONSTIPATION-A-AH    No stool in 2 days. Advised as per protocol

## 2019-04-03 NOTE — TELEPHONE ENCOUNTER
Endocrine staff addendum after patient discharged noted with recommendations to decrease calcium to 1000 mg TID. Called patient and discussed this medication change. Patient understands and is doing well without hypocalcemic symptoms.    Katie Vaughan MD  General Surgery, PGY-III

## 2019-04-03 NOTE — PLAN OF CARE
Patient discharged home with no needs 4/2/2019.    Future Appointments   Date Time Provider Department Center   4/4/2019  1:30 PM Ashley Guallpa MD [facility] ENDSUR [facility]   4/5/2019  9:50 AM TED Crouch MD [facility] OPHTHAL [facility]   4/8/2019  8:30 AM LAB, [facility]   4/9/2019 11:45 AM Jose Luis Powell MD [facility] NEUROSC [facility]   4/29/2019  2:00 PM Lei Pinedo MD [facility] LIVERTX [facility]   5/6/2019  9:30 AM Sandra Magallon MD [facility] CARDIO [facility]          04/03/19 0810   Final Note   Assessment Type Final Discharge Note   Anticipated Discharge Disposition Home   Right Care Referral Info   Post Acute Recommendation No Care

## 2019-04-04 NOTE — PROGRESS NOTES
"I have reviewed the Resident's history and physical, assessment and plan. I agree with the findings. Any changes were made directly in the note below.    Ashley Guallpa MD  Endocrine Surgery & General Surgery        Subjective:       Holly Patel presents to the clinic 1 weeks following subtotal parathyroidectomy for tertiary hyperparathyroidism. Eating a regular diet without difficulty. Bowel movements are Abnormal- constipation, last BM 4/1/19.  The patient is not having any pain. Patient denies trouble swallowing, trouble speaking, voice changes and fingers, toes and perioral numbness or tingling. She is not currently taking recommended Calcium, did not  OTC medications. She is unsure if taking Rocaltrol (1 mcg BID). Labs checked at HD this morning, she states they were normal.       Objective:      BP (!) 164/109 (BP Location: Left arm, Patient Position: Sitting) Comment (Patient Position): Dialysis pt. Just came from Dialysis  Temp 98.1 °F (36.7 °C)   Resp 18   Ht 5' 5" (1.651 m)   Wt 50.5 kg (111 lb 5.3 oz)   LMP  (LMP Unknown) Comment: pt states does not have menstrual cycles  BMI 18.53 kg/m²     General:   alert, appears stated age and cooperative.    Incision:   well approximated, healing well, no signs of drainage, no erythema, no dehiscence and no swelling   Voice:  normal     Pathology:  Pending    Labs:  Lab Results   Component Value Date    CALCIUM 8.8 04/02/2019    CALCIUM 9.3 04/02/2019    CALCIUM 8.4 (L) 04/02/2019    TSH 2.623 11/15/2018    TSH 1.150 09/18/2018    TSH 3.016 05/16/2018    FREET4 1.31 05/16/2018    [id number] 9 (L) 03/28/2019    [id number] 13 (L) 10/02/2015    .0 (H) 03/29/2019    .0 (H) 03/28/2019    PTH 87.0 (H) 03/27/2019    PHOS 1.9 (L) 04/02/2019    PHOS 2.6 (L) 04/02/2019    PHOS 2.6 (L) 04/01/2019    CAION 1.11 04/02/2019    CAION 1.02 (L) 04/02/2019    TAINA 0.98 (L) 04/01/2019            Assessment:        Holly Patel " is doing well post-operatively after subtotal parathyroidectomy, partial left inferior parathyroidectomy- all other parathyroids removed 3/27/19, she is having compliance issues with calcium and calcitriol.       Plan:        1. Continue any current medications; reinforced importance of obtaining calcium and calcitriol and taking daily. She understands and will obtain after clinic. Will defer weaning supplementation to nephrology.  2. Start senna- colace BID and miralax daily for constipation.  Also okay to take magnesium citrate p.r.n.  3. Wound care and scar massage discussed.

## 2019-04-04 NOTE — PATIENT INSTRUCTIONS
"OK take Miralax, or Colace or Senna-manuel,  OK to Magnesium citrate 1 bottle now.    Questions About Your Scar    1. Why is my scar red?    You will notice during the first few weeks after surgery, the scar area will become red, firm, and hard. Scars often seem to become worse before they get better. This is normal. After about 6 weeks, the scar will begin to "mature." This means that the scar will soften and become less red. Over the next 4 months, the scar will slowly soft and will not be as red. It may take up to a year for the scar to mature.    2. What will the scar look like?    Most scars will become soft, flat, white lines over time.    3. When will the scars go away?    A scar is usually permanent. As the scar becomes softer and less red, it will begin to blend into the skin around the scar. It will become less visible.    4. Does putting Vitamin E oil on the scar help?    It is not known for sure if Vitamin E oil helps scars heal faster or makes them less visible. If you choose to use Vitamin E oil on the scar, it won't hurt. Applying any oil or lotion that can add moisture to the skin will help. Two weeks after surgery, it is a good idea to massage a scar gently but firmly for 5 minutes, two to four times each day. Use the oil or lotion of your choice when doing the massage.    5. What about sunlight and scars?    It is important to keep out of direct sunlight for up to one year after surgery. Too much direct sunlight makes a scar darker in color than the skin around the scar. You should use sunscreen with at least SPF #15 when outdoors.    6. What can I do to make the scar look better?    IT simply takes time for a scar to heal. If the scar is less than a year old, the scar may need to mature more. But some scars may get worse. If the scar line has become wide, more surgery can remove the wide area of the scar and make a more narrow line. If the scar has become higher, thicker, and redder, this may be a " ""keloidal" or "hypertrophic" scar.      Instructions for Scar Tissue Massage    The purpose of scar tissue massage is to soften the skin over scars and incision lines in order for the area to heal optimally. This exercise may also help prevent hypertrophic scarring (an area of excessive scar tissue).    A scar begins "maturing" at about three weeks after injury or surgery, and continues for 9 - 12 months.    You may begin performing scar tissue massage two to three weeks after your surgery. The pressure you apply initially should not be painful, rather a light massage to promote circulation. As you are able to tolerate more pressure you may press more firmly on any little bumps or lumps along your incision.    Scar tissue massage is done by using your index and middle fingers. Begin by firmly and systematically applying the lotion in a circular motion across the scar/incision line. The doctor or nurse will demonstrate this method. Imagine your massage breaking up the little cells that have begun to collect at the incision site so that your body may reabsorb those cells.    This exercise should be done consistently for five minutes at a time; once in the morning and once in the evening.    You may use any skin moisturizer or cocoa butter and/or vitamin E cream to perform scar tissue massage.    It is important to remember that the effectiveness of scar tissue massage is obtained from the method in which it is done rather than the type of moisturizer used.    Scar tissue massage should be continued throughout the scar or incision's maturing process (9 - 12 months) as instructed.    It is necessary to protect your scar/incision from the sun's damaging rays for 9 - 12 months after surgery. A SPF (Sun Protection Factor) of 15 or 30 is adequate for most skin types.  "

## 2019-04-04 NOTE — Clinical Note
Good afternoon, I saw Ms. Patel today.  She is looking good from a surgery standpoint.  It appears she has not been taking her calcium and Rocaltrol.  We reinforced the importance of her taking this.  I will defer the taper to you as this can be done in combination with her dialysis(high calcium bath).  Please let me know if you have any questions.Best,aob

## 2019-04-04 NOTE — TELEPHONE ENCOUNTER
"Compliance check:  Dialysis unit reports in the past three months pt has had "none" no shows, "none" AMAs, labs PTH 3178 "Patient was scheduled for parathyroidectomy on 3/27/19." no other concerns noted, transportation no concerns noted and family support no concerns noted .    Reported by Ciera Hendricks LCSW    "

## 2019-04-05 NOTE — ED PROVIDER NOTES
Encounter Date: 4/5/2019    SCRIBE #1 NOTE: I, Kecia Ybarra, am scribing for, and in the presence of,  Dr. Muse. I have scribed the following portions of the note - Other sections scribed: HPI, ROS, PE.       History     Chief Complaint   Patient presents with    Constipation     pt reports she has not had a BM since 03/27 after having thyroid surgery. reports abdominal cramping and passing flatus. denies nausea, vomiting, chest pain, dizziness or any other symptoms.      CC: constipation  28 y.o female presents with constipation and rectal pain for 4 days. Her last normal BM was on 4/1/19. She reports having thyroid surgery on 3/27/19.  She reports generalized abdominal pain and dizziness for 2 days. She describes the abdominal pain as a thumping pain. She denies any appetite change, N/V, chest pain, fever, HA, back pain, or SOB. Patient has a hx of renal disease and renovascular HTN and is currently on diaylsis. She has chronic decreased urine output.  She took Murelax and magnesium for 2 days without relief. No exacerating or alleviating factors.  She rates her pain as 10/10.      The history is provided by the patient. No  was used.     Review of patient's allergies indicates:   Allergen Reactions    Chloral hydrate Hallucinations     Other reaction(s): Hallucinations  Other reaction(s): Hives    Hydrocodone Other (See Comments)     Mental status changes     Past Medical History:   Diagnosis Date    Anemia in ESRD (end-stage renal disease) 10/12/2015    dialysis tues, thursday, sat; access left arm    Chronic rejection of liver transplant 3/22/2016    Depression     Encounter for blood transfusion     ESRD on hemodialysis 9/30/2015    History of recent hospitalization 05/2018    pneumonia    History of splenomegaly 4/12/2016    Immunosuppressed 8/5/2017    Iron deficiency anemia secondary to inadequate dietary iron intake 8/16/2017    She receives IV iron periodically at the  D[facility].   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 ()    Moderate protein-calorie malnutrition 2017    MRSA bacteremia 2017    Pneumonia     Prophylactic immunotherapy 2014    Renovascular hypertension 10/2/2015    Secondary hyperparathyroidism 2017    Seizures     Sialadenitis 3/21/2018    Thrombocytopenia 2016     Past Surgical History:   Procedure Laterality Date    BIOPSY, LIVER, TRANSJUGULAR APPROACH N/A 2018    Performed by [facility] at [facility] OR 2ND FLR    BIOPSY-LIVER N/A 2017    Performed by [facility] at [facility] OR 2ND FLR    BIOPSY-LIVER N/A 3/22/2016    Performed by [facility] at [facility] OR 2ND FLR     SECTION      x 2    CONIZATION-CERVICAL-LEEP N/A 6/15/2018    Performed by [facility] at [facility] OR    [id number]; upper extremity Left 2015    Performed by Idalia Diaz [facility] at [facility] OR 2ND FLR    EMBOLIZATION, BLOOD VESSEL N/A 2018    Performed by [facility]ren Ramos MD at [facility] CATH LAB    Exam Under Anesthesia N/A 2018    Performed by [facility] at [facility] OR 2ND FLR    Exam under anesthesia N/A 2018    Performed by [facility]quin [facility] at [facility] OR    Exam under anesthesia (ADD ON ) N/A 2018    Performed by NICKIE Alvarez MD at [facility] OR    Exam under anesthesia -cervical suturing  N/A 2018    Performed by Lei Sims III, MD at [facility] OR    FISTULOGRAM Left 2015    Performed by [facility]alia [facility] at [facility] CATH LAB    LIVER BIOPSY      LIVER TRANSPLANT  1992    PARATHYROIDECTOMY, Minimally Invasive Bilateral Exploration Bilateral 3/27/2019    Performed by Ashley [facility]allpa MD at [facility] OR 2ND FLR    SUTURE REPAIR,CERVIX  2018    Performed by [facility] at [facility] OR    TUBAL LIGATION       Family History   Problem Relation Age of Onset    Hypertension Mother      Hypertension Father     Cancer Sister     Heart attack Maternal Uncle     Melanoma Neg Hx     Breast cancer Neg Hx     Colon cancer Neg Hx     Ovarian cancer Neg Hx      Social History     Tobacco Use    Smoking status: Never Smoker    Smokeless tobacco: Never Used   Substance Use Topics    Alcohol use: No    Drug use: No     Review of Systems   Constitutional: Negative for appetite change and fever.   Respiratory: Negative for shortness of breath.    Cardiovascular: Negative for chest pain.   Gastrointestinal: Positive for abdominal pain (generalized) and constipation. Negative for nausea and vomiting.   Genitourinary: Positive for decreased urine volume (chronic ).   Musculoskeletal: Negative for back pain.   Neurological: Positive for dizziness. Negative for headaches.   All other systems reviewed and are negative.      Physical Exam     Initial Vitals [04/05/19 0821]   BP Pulse Resp Temp SpO2   (!) 160/110 79 18 98.1 °F (36.7 °C) 98 %      MAP       --         Physical Exam    Nursing note and vitals reviewed.  Constitutional: She appears well-developed and well-nourished. She is not diaphoretic. No distress.   HENT:   Head: Normocephalic and atraumatic.   Eyes: EOM are normal.   Neck: Normal range of motion. Neck supple.       Cardiovascular: Normal rate, regular rhythm and normal heart sounds. Exam reveals no gallop and no friction rub.    No murmur heard.  Pulmonary/Chest: Breath sounds normal. No respiratory distress. She has no wheezes. She has no rhonchi. She has no rales.   Abdominal: Soft. She exhibits no distension. There is no tenderness. There is no rebound and no guarding.   Musculoskeletal: Normal range of motion. She exhibits no edema or tenderness.   Dialysis site in the left arm    Neurological: She is alert and oriented to person, place, and time. No cranial nerve deficit or sensory deficit.   Skin: Skin is warm and dry. Capillary refill takes less than 2 seconds.   Psychiatric: She  has a normal mood and affect. Her behavior is normal.         ED Course   Procedures  Labs Reviewed - No data to display       Imaging Results          X-Ray Abdomen Flat And Erect (Final result)  Result time 04/05/19 09:15:46    Final result by Myrna August MD (04/05/19 09:15:46)                 Impression:      Please see above.      Electronically signed by: Myrna August MD  Date:    04/05/2019  Time:    09:15             Narrative:    EXAMINATION:  XR ABDOMEN FLAT AND ERECT    CLINICAL HISTORY:  Constipation, unspecified    TECHNIQUE:  Flat and erect AP views of the abdomen were performed.    COMPARISON:  Abdominal ultrasound: 01/11/2019.    FINDINGS:  There is a diffuse haziness of the abdomen consistent with intraperitoneal fluid in this patient who had a large volume of ascites on ultrasound of 01/11/2019.    Gas is present in nondistended loops of small and large bowel.  Hemostasis clips in the right upper quadrant suggest prior cholecystectomy.    There is abundant feces in the rectum and distal colon consistent with obstipation; disimpaction may be warranted.    I detect no pneumoperitoneum, intrathoracic disease or significant osseous abnormality.                                 Medical Decision Making:   Initial Assessment:   28 y.o female presents with constipation for 4 days and generalized abdominal pain for 2 days. No significant exam findings. Abdomen is soft non tender. No rebound, guarding, or distension. Dialysis site noted in the left arm. Healing scar noted to the neck with no evidence of infection. No respiratory distress. Heart and lungs sound normal. Neurovascularly intact. Sensation intact.   Clinical Tests:   Radiological Study: Ordered and Reviewed  ED Management:  I will order an xray of the abdomen.  X-ray of the abdomen showed a large stool burden.  Patient was treated with Bentyl and given a brown bomber enema with much improvement.  She had a large bowel movement in the ER.   She will be discharged on lactulose and Bentyl            Scribe Attestation:   Scribe #1: I performed the above scribed service and the documentation accurately describes the services I performed. I attest to the accuracy of the note.       I, Dr. Nydia Muse, personally performed the services described in this documentation. All medical record entries made by the scribe were at my direction and in my presence.  I have reviewed the chart and agree that the record reflects my personal performance and is accurate and complete. Nydia Muse MD.  11:25 AM 04/05/2019             Clinical Impression:     1. Constipation                                 Nydia Muse MD  04/05/19 1125

## 2019-04-09 NOTE — PATIENT INSTRUCTIONS
I have personally reviewed the CT head with the pt which shows evolving heterogeneous lucency and sclerosis of the calvarium compatible with sequela of hyperparathyroidism. Multiple scattered small lucent lesion of the calvarium with focal bony expansile heterogeneous density lesion within the left temporal calvarium.    I will schedule the patient for 2 month follow up with repeat CT of head WO contrast.

## 2019-04-09 NOTE — PROGRESS NOTES
Subjective:   I, Curly Betancur, attest that this documentation has been prepared under the direction and in the presence of Jose Luis Powell MD.     Patient ID: Holly Patel is a 28 y.o. female     Chief Complaint: Follow-up (hospital f/u)      HPI  Ms. Holly Patel is a pleasant 28 y.o. woman with a history of liver transplant in 1991, recent thyroidectomy on 3/27/2019, ESRD on HD, and epilepsy who presents today for follow up after recent ED visit. The pt presented to [facility] on 03/12/2019 with a complaint of headaches, among other symptoms, and received a workup that included a CTH which showed a left temporal calvarium lesion with mass effect. Today, she states she has continued to have a headache, noting the pain is usually over the left temporal aspect of her head but migrated over to the right temporal aspect several days ago. She states the pain on the right is exacerbated what palpation of the area. She has no additional symptomatic complaints at this time.     Review of Systems   Constitutional: Negative for activity change, fatigue, fever and unexpected weight change.   HENT: Negative for facial swelling.    Eyes: Negative.    Respiratory: Negative.    Cardiovascular: Negative.    Gastrointestinal: Negative for diarrhea, nausea and vomiting.   Genitourinary: Negative.    Musculoskeletal: Negative for back pain, joint swelling, myalgias and neck pain.   Neurological: Positive for headaches. Negative for dizziness, weakness and numbness.   Psychiatric/Behavioral: Negative.       Past Medical History:   Diagnosis Date    Anemia in ESRD (end-stage renal disease) 10/12/2015    dialysis tues, thursday, sat; access left arm    Chronic rejection of liver transplant 3/22/2016    Depression     Encounter for blood transfusion     ESRD on hemodialysis 9/30/2015    History of recent hospitalization 05/2018    pneumonia    History of splenomegaly 4/12/2016    Immunosuppressed 8/5/2017    Iron  deficiency anemia secondary to inadequate dietary iron intake 8/16/2017    She receives IV iron periodically at the Dialysis [facility].   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)    Moderate protein-calorie malnutrition 8/16/2017    MRSA bacteremia 8/6/2017    Pneumonia     Prophylactic immunotherapy 8/4/2014    Renovascular hypertension 10/2/2015    Secondary hyperparathyroidism 8/5/2017    Seizures     Sialadenitis 3/21/2018    Thrombocytopenia 4/12/2016       Objective:      Vitals:    04/09/19 1122   BP: (!) 173/109   Pulse: 71   Temp: 97.3 °F (36.3 °C)      Physical Exam   Constitutional: She is oriented to person, place, and time. She appears well-developed and well-nourished.   HENT:   Head: Normocephalic and atraumatic.   Neck: Neck supple.   Neurological: She is alert and oriented to person, place, and time. No cranial nerve deficit. She displays a negative Romberg sign. GCS eye subscore is 4. GCS verbal subscore is 5. GCS motor subscore is 6.          IMAGING:  CT Head Without Contrast (03/12/2019) shows evolving heterogeneous lucency and sclerosis of the calvarium compatible with sequela of hyperparathyroidism. Multiple scattered small lucent lesion of the calvarium with focal bony expansile heterogeneous density lesion within the left temporal calvarium. Mass effect from the expansile lesion on the left cerebral hemisphere with approximately 2-3 mm of rightward midline shift.      Labs 04/02/2019: Calcium within normal limits.     I have personally reviewed the images with the pt.      I, Dr. Jose Luis Powell, personally performed the services described in this documentation. All medical record entries made by the scribe, Curly Betancur, were at my direction and in my presence.  I have reviewed the chart and agree that the record reflects my personal performance and is accurate and complete. Jose Luis Powell MD. 04/09/2019    Assessment:       1. Brain lesion          Plan:   I have personally reviewed the CT head with the pt which shows evolving heterogeneous lucency and sclerosis of the calvarium compatible with sequela of hyperparathyroidism. Multiple scattered small lucent lesion of the calvarium with focal bony expansile heterogeneous density lesion within the left temporal calvarium. Mass effect from the expansile lesion on the left cerebral hemisphere with approximately 2-3 mm of rightward midline shift.    I will schedule the patient for 2 month follow up with repeat CT head WO contrast.

## 2019-04-09 NOTE — H&P (VIEW-ONLY)
Subjective:   I, Curly Betancur, attest that this documentation has been prepared under the direction and in the presence of Jose Luis Pwoell MD.     Patient ID: Holly Patel is a 28 y.o. female     Chief Complaint: Follow-up (hospital f/u)      HPI  Ms. Holly Patel is a pleasant 28 y.o. woman with a history of liver transplant in 1991, recent thyroidectomy on 3/27/2019, ESRD on HD, and epilepsy who presents today for follow up after recent ED visit. The pt presented to [facility] on 03/12/2019 with a complaint of headaches, among other symptoms, and received a workup that included a CTH which showed a left temporal calvarium lesion with mass effect. Today, she states she has continued to have a headache, noting the pain is usually over the left temporal aspect of her head but migrated over to the right temporal aspect several days ago. She states the pain on the right is exacerbated what palpation of the area. She has no additional symptomatic complaints at this time.     Review of Systems   Constitutional: Negative for activity change, fatigue, fever and unexpected weight change.   HENT: Negative for facial swelling.    Eyes: Negative.    Respiratory: Negative.    Cardiovascular: Negative.    Gastrointestinal: Negative for diarrhea, nausea and vomiting.   Genitourinary: Negative.    Musculoskeletal: Negative for back pain, joint swelling, myalgias and neck pain.   Neurological: Positive for headaches. Negative for dizziness, weakness and numbness.   Psychiatric/Behavioral: Negative.       Past Medical History:   Diagnosis Date    Anemia in ESRD (end-stage renal disease) 10/12/2015    dialysis tues, thursday, sat; access left arm    Chronic rejection of liver transplant 3/22/2016    Depression     Encounter for blood transfusion     ESRD on hemodialysis 9/30/2015    History of recent hospitalization 05/2018    pneumonia    History of splenomegaly 4/12/2016    Immunosuppressed 8/5/2017    Iron  deficiency anemia secondary to inadequate dietary iron intake 8/16/2017    She receives IV iron periodically at the [facility].   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)    Moderate protein-calorie malnutrition 8/16/2017    MRSA bacteremia 8/6/2017    Pneumonia     Prophylactic immunotherapy 8/4/2014    Renovascular hypertension 10/2/2015    Secondary hyperparathyroidism 8/5/2017    Seizures     Sialadenitis 3/21/2018    Thrombocytopenia 4/12/2016       Objective:      Vitals:    04/09/19 1122   BP: (!) 173/109   Pulse: 71   Temp: 97.3 °F (36.3 °C)      Physical Exam   Constitutional: She is oriented to person, place, and time. She appears well-developed and well-nourished.   HENT:   Head: Normocephalic and atraumatic.   Neck: Neck supple.   Neurological: She is alert and oriented to person, place, and time. No cranial nerve deficit. She displays a negative Romberg sign. GCS eye subscore is 4. GCS verbal subscore is 5. GCS motor subscore is 6.          IMAGING:  CT Head Without Contrast (03/12/2019) shows evolving heterogeneous lucency and sclerosis of the calvarium compatible with sequela of hyperparathyroidism. Multiple scattered small lucent lesion of the calvarium with focal bony expansile heterogeneous density lesion within the left temporal calvarium. Mass effect from the expansile lesion on the left cerebral hemisphere with approximately 2-3 mm of rightward midline shift.      Labs 04/02/2019: Calcium within normal limits.     I have personally reviewed the images with the pt.      I, Dr. Jose Luis Powell, personally performed the services described in this documentation. All medical record entries made by the scribe, Curly Betancur, were at my direction and in my presence.  I have reviewed the chart and agree that the record reflects my personal performance and is accurate and complete. Jose Luis Powell MD. 04/09/2019    Assessment:       1. Brain lesion          Plan:   I have personally reviewed the CT head with the pt which shows evolving heterogeneous lucency and sclerosis of the calvarium compatible with sequela of hyperparathyroidism. Multiple scattered small lucent lesion of the calvarium with focal bony expansile heterogeneous density lesion within the left temporal calvarium. Mass effect from the expansile lesion on the left cerebral hemisphere with approximately 2-3 mm of rightward midline shift.    I will schedule the patient for 2 month follow up with repeat CT head WO contrast.

## 2019-04-09 NOTE — LETTER
April 9, 2019    Holly Patel  [address] [place] [id number]          Dear Holly Patel:  MRN: [id number]    This is a follow up to your recent labs, your lab results were stable.  There are no medicine changes.  Please have your labs drawn again on 5/6/19.      If you cannot have your labs drawn on the scheduled date, it is your responsibility to call the transplant department to reschedule.  To reschedule or make an appointment, please as to speak to or leave a message for my assistant, Tara Pollack or [place], at [phone].  When leaving a message for Tara Pollack Angela or myself, we ask that you leave a brief message regarding your request.    Sincerely,      Violeta Frnacis, RN, BSN, [facility]  Liver Transplant Coordinator  [facility]  [address] [place] [id number]  [phone]

## 2019-04-09 NOTE — TELEPHONE ENCOUNTER
----- Message from Neelam Vieira sent at 4/8/2019  4:37 PM CDT -----  Contact: patient       ----- Message -----  From: Xochilt Casper  Sent: 4/8/2019  10:49 AM  To: [facility] Pre-Kidney Transplant Clinical    Attn Lien    Please call pt at [phone]    Patient would like know the status of her kidney transplant?    Thank you

## 2019-04-15 NOTE — TELEPHONE ENCOUNTER
"----- Message from Arielle Siddiqui MD sent at 4/15/2019  1:38 PM CDT -----  FYI    ----- Message -----  From: Jose Luis Powell MD  Sent: 4/15/2019  10:00 AM  To: Arielle Siddiqui MD, #    I am not certain of the diagnosis.  However, we could remove it ASAP to expedite her transplant evaluation. We could remove this and get the diagnosis within the next week and get this sorted.    I can talk to my nurse and get this done.    Thanks.    MLW  ----- Message -----  From: Arielle Siddiqui MD  Sent: 4/12/2019   6:10 PM  To: MD Dejan Robbins,    This lady is completing her transplant evaluation.  My nurse asked me if this concerning brain lesion should hold her up.  Based on you note, it appears you think it is benign?    We do have any problem if she gets transplanted before the follow-up CT is done?  This is highly unlikely to happen, but it helps me to  how to proceed. Thanks!  Day    You wrote: "I have personally reviewed the CT head with the pt which shows evolving heterogeneous lucency and sclerosis of the calvarium compatible with sequela of hyperparathyroidism. Multiple scattered small lucent lesion of the calvarium with focal bony expansile heterogeneous density lesion within the left temporal calvarium. Mass effect from the expansile lesion on the left cerebral hemisphere with approximately 2-3 mm of rightward midline shift.     I will schedule the patient for 2 month follow up with repeat CT head WO contrast."      "

## 2019-04-15 NOTE — TELEPHONE ENCOUNTER
I returned call to Ms Patel as she wanted to know about her transplant process. I have advised that there is concern for the lesion that is growing in her head and the neurosurgeon is considering surgery to remove it. I advised that she would get a call from the office and she should speak with the doctor about this. I advised if she does not here from the office today it should be no longer than tomorrow and she should call them. She verbalized understanding and will be expecting the call.

## 2019-04-16 NOTE — TELEPHONE ENCOUNTER
----- Message from Enid Wood RN sent at 4/16/2019 11:07 AM CDT -----  Please schedule T&S for tomorrow. Thanks!

## 2019-04-16 NOTE — ANESTHESIA PREPROCEDURE EVALUATION
Anesthesia Assessment: Preoperative EQUATION     Planned Procedure: Procedure(s) (LRB):  EXCISION, NEOPLASM, SKULL with Cranioplasty (Left)  Requested Anesthesia Type:General  Surgeon: Jose Luis Powell MD  Service: Neurosurgery  Known or anticipated Date of Surgery:4/22/2019     Surgeon notes: reviewed     Electronic QUestionnaire Assessment completed via nurse interview with patient.    NO AQ     Triage considerations:      The patient has no apparent active cardiac condition (No unstable coronary Syndrome such as severe unstable angina or recent [<1 month] myocardial infarction, decompensated CHF, severe valvular   disease or significant arrhythmia)     Previous anesthesia records:No problems and Not available     Last PCP note: within 1 month , within [facility]   Subspecialty notes: Cardiology: General, Endocrinology, Liver Transplant, Neurology, NEUROSURGERY, GENERAL SURGERY,OB/GYN,OPHTHALMOLOGY     Other important co-morbidities: SEIZURES,HTN,RENOVASCULAR HTN, ESRD ON HD, IMMUNOSUPRESSION , LIVER TRANSPLANT, ANEMIA HYPERPARATHYROIDISM,THROMBOCYTOPENIA, BENIGN NEOPLASM OF BONES OF SKULL AND FACE     Tests already available:  Available tests,  within 1 month , within [facility] .4/9/2019 TACROLIMUS, CMP, CBC, 3/30/2019 PT/INR, 3/12/2019 EKG                            Instructions given. (See in Nurse's note)     Optimization:               Plan:    Testing:  T&S                           Patient  has previously scheduled Medical Appointment:4/17 CT HEAD     Navigation: Tests Scheduled.                                                Results will be tracked by Preop Clinic.                                     Electronically signed by Enid Wood RN at 4/16/2019 11:15 AM       Pre-admit on 4/22/2019            Detailed Report    4/17 Labs resulted and noted.       Addendum 3/26/19 1321: per PCP, Dr Sosa: preop -- pt is at low to moderate risk for surgery, ok to proceed for this moderately risky  "procedure.  Pt evaluated in liver transplant clinic by Dr Pinedo on 3/25/19 (see note).  Pt admitted to hospital 3/12-3/13/19 for hypertensive urgency, gastroenteritis (see note)  Nephrology note 3/14/19. Pt was seen in POC 3/8/19; surgeon's office to obtain hepatology and nephrology clearances/Mag Moreno RN    Addendum 4/16/2019:  Contacted by NS clinic for patient with upcoming surgery 4/22, too late to triage.  Case reviewed with Dr. Leopold.  PT & T/S needed per NS.  Nephrology instructions requested from Dr. Bass per NS.      IM Dr. Sosa contacted for any concerns: "As long as her blood pressure is ok day of surgery I do not have any new specific concerns for her proceeding with cranium surgery.  Let me know if you have any specific questions.  Stan Sosa"                                                                                                                   04/16/2019  Holly Patel is a 28 y.o., female.    Anesthesia Evaluation    I have reviewed the Patient Summary Reports.    I have reviewed the Nursing Notes.   I have reviewed the Medications.     Review of Systems  Anesthesia Hx:  No problems with previous Anesthesia  Denies Family Hx of Anesthesia complications.   Denies Personal Hx of Anesthesia complications.   Social:  No Alcohol Use, Non-Smoker    Hematology/Oncology:     Oncology Normal    -- Anemia: Iron Deficiency Anemia Anemia of Chronic Kidney Disease Hematology Comments: THROMBOCYTOPENIA    EENT/Dental:   Eyes: htn retinopathy   Cardiovascular:  Functional Capacity 4 METS, able to climb 2 flights of stairs  Hypertension , Recent typical clinic B/P of 118/82    Pulmonary:  Pulmonary Normal  Denies Shortness of breath.  Denies Recent URI.    Renal/:  Kidney Function/Disease  Dialysis Dependent, T-TH-S, chronic hemodialysis Last Dialyzed Saturday   Hepatic/GI:  Liver Disease Liver transplant 9/1992   Musculoskeletal:  Musculoskeletal General/Symptoms: " Functional capacity is ambulatory without assistance.    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        Lab Results   Component Value Date    WBC 2.53 (L) 04/09/2019    HGB 9.0 (L) 04/09/2019    HCT 27.5 (L) 04/09/2019    MCV 78 (L) 04/09/2019    PLT 50 (L) 04/09/2019     BMP  Lab Results   Component Value Date     04/09/2019    K 3.6 04/09/2019     04/09/2019    CO2 28 04/09/2019    BUN 16 04/09/2019    CREATININE 4.0 (H) 04/09/2019    CALCIUM 7.6 (L) 04/09/2019    ANIONGAP 9 04/09/2019    ESTGFRAFRICA 16.6 (A) 04/09/2019    EGFRNONAA 14.4 (A) 04/09/2019         Physical Exam  General:  Well nourished    Airway/Jaw/Neck:  Airway Findings: Tongue: Normal General Airway Assessment: Adult  Mallampati: II  TM Distance: 4 - 6 cm  Jaw/Neck Findings:  Neck ROM: Normal ROM      Dental:  Dental Findings:         Mental Status:  Mental Status Findings:  Cooperative, Alert and Oriented         Anesthesia Plan  Type of Anesthesia, risks & benefits discussed:  Anesthesia Type:  general  Patient's Preference:   Intra-op Monitoring Plan: standard ASA monitors  Intra-op Monitoring Plan Comments:   Post Op Pain Control Plan: per primary service following discharge from PACU and multimodal analgesia  Post Op Pain Control Plan Comments:   Induction:   IV  Beta Blocker:  Patient is on a Beta-Blocker and has received one dose within the past 24 hours (No further documentation required).       Informed Consent: Patient understands risks and agrees with Anesthesia plan.  Questions answered. Anesthesia consent signed with patient.  ASA Score: 4     Day of Surgery Review of History & Physical:    H&P update referred  to the surgeon.     Anesthesia Plan Notes: Uncontrolled HTN.  Baseline /111 - patient states this is her baseline.  Plts 50 - will transfuse plts prior to procedure in OR        Ready For Surgery From Anesthesia Perspective.

## 2019-04-16 NOTE — TELEPHONE ENCOUNTER
Hi,  As long as her blood pressure is ok day of surgery I do not have any new specific concerns for her proceeding with cranium surgery.  Let me know if you have any specific questions.  Stan Sosa

## 2019-04-16 NOTE — PRE-PROCEDURE INSTRUCTIONS
Patient stated has not had any problems with anesthesia in the past.Will need one lab, T&S. Our  singh to set up this appt.She goes to  on t/th/sat. Preop instructions given. Hold asa, asa containing products, nsaids, vitamins and supplements one week prior to surgery. (starting today)  Shower the night before surgery and the morning of surgery with an antibacterial soap( hibiclens or dial antibacterial soap).  Nothing on the skin once shower. Do not apply any deodorant, lotion, powder, perfume,or aftershave.No makeup or moisturizer. No fingernail polish or jewelry going to surgery. May have solid foods, gum, and hard candy until 8 hours before surgery/procedure time.  May have clear liquids( water, gatorade, powerade or apple juice) until 2 hours prior to surgery/procedure time.  No red or orange drinks. If in doubt , drink water. Nothing to drink 2 hours before arrival time for surgery/procedure. If you are told to take medication in the morning of surgery, it may be taken with a sip of water. If your doctor tells you something different pertaining to when to stop eating or drinking, follow your doctor's instructions.Call for changes in status or questions. She stated she knows where the surgery center is on the second floor.  Verbalizes understanding.

## 2019-04-16 NOTE — PRE ADMISSION SCREENING
Anesthesia Assessment: Preoperative EQUATION    Planned Procedure: Procedure(s) (LRB):  EXCISION, NEOPLASM, SKULL with Cranioplasty (Left)  Requested Anesthesia Type:General  Surgeon: Jose Luis Powell MD  Service: Neurosurgery  Known or anticipated Date of Surgery:4/22/2019    Surgeon notes: reviewed    Electronic QUestionnaire Assessment completed via nurse interview with patient.    NO AQ    Triage considerations:     The patient has no apparent active cardiac condition (No unstable coronary Syndrome such as severe unstable angina or recent [<1 month] myocardial infarction, decompensated CHF, severe valvular   disease or significant arrhythmia)    Previous anesthesia records:No problems and Not available    Last PCP note: within 1 month , within [facility]   Subspecialty notes: Cardiology: General, Endocrinology, Liver Transplant, Neurology, NEUROSURGERY, GENERAL SURGERY,OB/GYN,OPHTHALMOLOGY    Other important co-morbidities: SEIZURES,HTN,RENOVASCULAR HTN, ESRD ON HD, IMMUNOSUPRESSION , LIVER TRANSPLANT, ANEMIA HYPERPARATHYROIDISM,THROMBOCYTOPENIA, BENIGN NEOPLASM OF BONES OF SKULL AND FACE     Tests already available:  Available tests,  within 1 month , within [facility] .4/9/2019 TACROLIMUS, CMP, CBC, 3/30/2019 PT/INR, 3/12/2019 EKG            Instructions given. (See in Nurse's note)    Optimization:    Plan:    Testing:  T&S      Patient  has previously scheduled Medical Appointment:4/17 CT HEAD    Navigation: Tests Scheduled.                          Results will be tracked by Preop Clinic.

## 2019-04-16 NOTE — TELEPHONE ENCOUNTER
Hello Provider,       Your patient indicated above requires pre-op clearance/ risk stratification for a no  with EXCISION, NEOPLASM, SKULL with Cranioplasty on  4/22/19.      They were last seen in your office on 3/29 and cleared for a recent surgery.    Do you have any reservations with the patient proceeding to surgery.    José Miguel  [id number]

## 2019-04-16 NOTE — TELEPHONE ENCOUNTER
Called pt. Explained Dr Powell and Dr Siddiqui discussed pt's transplant evaluation and would like to remove the skull lesion for bx on Monday

## 2019-04-17 NOTE — TELEPHONE ENCOUNTER
----- Message from Libra Mckeon RN sent at 4/16/2019  9:57 AM CDT -----  Regarding: Pt scheduled for surgery Monday 4/22.  Good Morning,    Ms Patel was just scheduled for surgery Monday with Dr Powell to excise an expansile skull lesion for diagnosis. This is being done at the request of her transplant physician.    The Pre-Op Center needs to know what should be done with her dialysis. I have copies José Miguel Brady of the Pre-Op Center on this message.    Many thanks,    Gaby Mckeon, RN

## 2019-04-17 NOTE — TELEPHONE ENCOUNTER
Called pt at her request via V/M. Explained that her CT was consistent with the previous one and that this CT was done to use as guidance in surgery. She v/u.

## 2019-04-18 NOTE — TELEPHONE ENCOUNTER
Patient advised to arrive for surgery time at 0500, DOSC, NPO after MN on Sunday, complete shower with antibacterial soap night before and morning of. Understanding verbalized by patient.

## 2019-04-18 NOTE — TELEPHONE ENCOUNTER
Pt called to state they were having problems with her Rx for Rocaltrol at the [facility] in [place]. I called and spoke with Pharmacy staff who stated the pt had a Rx for 0.25 that was awaiting authorization. I informed her the physician I work for did not order that particular med. Advised I would call the pt. Spoke with pt and she said it was ordered by Dr. Sánchez. I advised her to call their office and have them call the pharmacy to clarify order.

## 2019-04-22 PROBLEM — G93.5 BRAIN COMPRESSION: Status: ACTIVE | Noted: 2019-01-01

## 2019-04-22 PROBLEM — D49.6 BRAIN TUMOR: Status: ACTIVE | Noted: 2019-01-01

## 2019-04-22 NOTE — ASSESSMENT & PLAN NOTE
29 yo F w/ L calvarial lesion s/p excision and cranioplasty on 4/22/19. Well tolerated w/o acute complications. E4V5M6. Non-focal neuro exam. Post-op CT w/ surgical drain in place, L temporal emphysema and hematoma; mild mass effect, no midline shift.     Plan:  - Patient admitted to [facility]  - q1h neurochecks  - PRN oxycodone 5 mg q6 for moderate pain control  - Repeat CT head 6 hrs from post-op imaging  - Cardene gtt to maintain SBP <140   - Hydralazine, labetalol PRN  - Keppra 500mg BID  - Vimpat 50 mg BID  - Maintain Na >135  - HOB >35 degree at all times  - HV drain to full suction   - Cefazolin 2g daily while drain in place for ppx  - Advance diet as tolerated  - To notify NSG w/ any changes in neuro status

## 2019-04-22 NOTE — ASSESSMENT & PLAN NOTE
2/2 non-compliance with activated vit D and calcium supplementation s/p parathyroidectomy  -PTH level in AM  -phos levels daily  -corrected calcium low @ 7.04  -check iCA.  If < 1.0, recommend replacement with 1 mg of calcium chloride IV  -will start on calcitriol 0.5 mcg po BID  -will start on Calcium Carbonate 2 g TID

## 2019-04-22 NOTE — PROGRESS NOTES
RN notified anesthesia staff of patients elevated blood pressure in pre-op, verbalized understanding.

## 2019-04-22 NOTE — ASSESSMENT & PLAN NOTE
27 yo F w/ L calvarial lesion s/p excision and cranioplasty on 4/22/19. Well tolerated w/o acute complications. E4V5M6. Non-focal neuro exam. Initial post-op CT w/ surgical drain in place, L temporal emphysema and hematoma; mild mass effect, no midline shift. However worsening exam, repeat CT w/ midline shift. S/p L temporal clot evacuation.     Plan:  - Patient admitted to [facility]  - q1h neurochecks  - Cardene gtt to maintain SBP <140   - Resume home coreg 25 mg BID  - Hydralazine, labetalol PRN  - Keppra 500mg BID  - Vimpat 50 mg BID  - Maintain Na >135  - HOB >35 degree at all times  - HV drain to full suction   - Cefazolin 2g daily while drain in place for ppx  - To notify NSG w/ any changes in neuro status

## 2019-04-22 NOTE — ASSESSMENT & PLAN NOTE
ESRD on iHD TTS  [facility]  Dr. Bass  3.5 hours  EDW ~ 50 kg  LFA AVF    Plan/Recommendations:  -No urgent need for RRT today  -plan for HD tomorrow.  -continue strict I/O's  -daily renal panel with phos added  -renal diet

## 2019-04-22 NOTE — HOSPITAL COURSE
4/22: Admitted to [facility]. Initiated cardene gtt to maintain SBP <140. Consulted hepatology and nephrology. Acute worsening of neuro exam. STAT CT revealed enlarging hematoma w/ midline shift. Urgently taken down to OR for evacuation. Return from OR intubated.   4/23: Neuro exam improved from previous. Somnolent but rousable. Following commands. Moving all extremities spontaneously; RUE 4/5. Pending CRRT vs HD today.  04/24/2019: no acute adverse events overnight  4/27: KUB, resume oral feeds, ionized Ca, HD over night successful, increase hydralazine, decrease HTS  4/28: D/C HTS, increase hydralazine, likely add clonidine today as well, wean cardene, suppository  4/29: slight improvement in neuro exam, following commands. Started on HTS 2%. Increased clonidine and weaned off of cardene this AM, D/Fabian A-line. INR remains elevated. Shifted potassium. nephro with plans for slow HD.   4/30: neurologically unchanged. intermittently on cardene, increase clonidine to 0.3 TID, resuming home irbesartan. D/Fabian HTS 2%, started Na tablets. Transfused with plt x2 by NSGY.  05/01/2019 Patient remains aphasic,slightly worse than prior. No other focal neurologic deficits. Tolerating HD well. SBP goal <160, will attempt to wean off cardene. Awaiting Plt >100 to start heparin SQ. Will obtain a CT scan if any other neurological changes are observed.   05/03/2019 Patient w/ some improvement noted, particularly w/ word finding and naming of objects. HD planned for today. NSGY cleared for step down to Hospital Medicine.   05/04/2019Tolerated HD well, improving neurologically (more conversant, naming capabilities have been improving). NAEON , no new complaints. Stable for step down to Hospital Medicine

## 2019-04-22 NOTE — ASSESSMENT & PLAN NOTE
- On Verapamil, Irbasartan, Hydralazine, Coreg, Clonidine at home  - Hold home medications  - Continue cardene gtt w/ SBP goal <140  - Hydralazine, labetalol PRN

## 2019-04-22 NOTE — H&P
[facility]  Neurocritical Care  History & Physical    Admit Date: 4/22/2019  Service Date: 04/22/2019  Length of Stay: 0    Subjective:     Chief Complaint: Brain tumor    History of Present Illness: 29 yo F w/ PMH significant for liver transplant in 1991, recent thyroidectomy on 3/27/2019, ESRD on HD (MWF), and epilepsy who presents to [facility] for higher level of care following planned surgical excision of L calvarial lesion s/p excision and cranioplasty 4/22/19. The pt presented to [facility] on 03/12/2019 with a complaint of headaches, among other symptoms, and received a workup that included a CT head which showed a left temporal calvarium lesion with mass effect. At that time she shared that she continued to have a headache, noting the pain was usually over the left temporal aspect of her head but migrated over to the right temporal aspect. She states the pain on the right is exacerbated with palpation of the area. She has no additional symptomatic complaints at this time. Currently stable following surgery. Denies acute pain, otherwise comfortable w/ non-focal neurological exam.         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)    Moderate protein-calorie malnutrition 8/16/2017    MRSA bacteremia 8/6/2017    Pneumonia     Prophylactic immunotherapy 8/4/2014    Renovascular hypertension 10/2/2015    Secondary  hyperparathyroidism 2017    Seizures     Sialadenitis 3/21/2018    Thrombocytopenia 2016     Past Surgical History:   Procedure Laterality Date    BIOPSY, LIVER, TRANSJUGULAR APPROACH N/A 2018    Performed by [facility] OR 2ND FLR    BIOPSY-LIVER N/A 2017    Performed by [facility] OR 2ND FLR    BIOPSY-LIVER N/A 3/22/2016    Performed by [facility] OR 2ND FLR     SECTION      x 2    CONIZATION-CERVICAL-LEEP N/A 6/15/2018    Performed by [facility]quin [facility] at [facility] OR    [id number]; upper extremity Left 2015    Performed by Idalia Diaz MD at [facility] OR 2ND FLR    EMBOLIZATION, BLOOD VESSEL N/A 2018    Performed by Aren Ramos MD at [facility] CATH LAB    Exam Under Anesthesia N/A 2018    Performed by [facility]am [facility]quin [facility] at [facility] OR 2ND FLR    Exam under anesthesia N/A 2018    Performed by [facility] at [facility] OR    Exam under anesthesia (ADD ON ) N/A 2018    Performed by NICKIE Alvarez MD at [facility] OR    Exam under anesthesia -cervical suturing  N/A 2018    Performed by Lei Sims III, MD at [facility] OR    FISTULOGRAM Left 2015    Performed by Idalia Diaz MD at [facility] CATH LAB    LIVER BIOPSY      LIVER TRANSPLANT  1992    PARATHYROIDECTOMY, Minimally Invasive Bilateral Exploration Bilateral 3/27/2019    Performed by Ashley Guallpa MD at [facility] OR 2ND FLR    SUTURE REPAIR,CERVIX  2018    Performed by [facility]quin [facility] at [facility] OR    TUBAL LIGATION        No current facility-administered medications on file prior to encounter.      Current Outpatient Medications on File Prior to Encounter   Medication Sig Dispense Refill    calcitRIOL (ROCALTROL) 0.5 MCG Cap Take 2 capsules (1 mcg total) by mouth 2 (two) times daily. 60 capsule 1    calcium carbonate (OS-WLOFGANG) 500 mg calcium (1,250 mg) tablet Take 4 tablets  (2,000 mg total) by mouth 3 (three) times daily with meals.  0    carvedilol (COREG) 25 MG tablet Take 1 tablet (25 mg total) by mouth 2 (two) times daily. 60 tablet 11    cloNIDine (CATAPRES) 0.1 MG tablet Take 1 tablet (0.1 mg total) by mouth 2 (two) times daily. 60 tablet 1    dicyclomine (BENTYL) 20 mg tablet Take 1 tablet (20 mg total) by mouth every 6 (six) hours. 30 tablet 0    famotidine (PEPCID) 40 MG tablet Take 1 tablet (40 mg total) by mouth every morning. 30 tablet 11    hydrALAZINE (APRESOLINE) 100 MG tablet Take 1 tablet (100 mg total) by mouth every 8 (eight) hours. 90 tablet 11    lacosamide (VIMPAT) 50 mg Tab Take 1 tablet (50 mg total) by mouth 2 (two) times daily. 60 tablet 11    levETIRAcetam (KEPPRA) 500 MG Tab Take 1 tablet (500 mg total) by mouth 2 (two) times daily. 60 tablet 11    tacrolimus (PROGRAF) 1 MG Cap Take 6 capsules (6 mg total) by mouth every 12 (twelve) hours. 360 capsule 11    verapamil (CALAN-SR) 240 MG CR tablet Take 1 tablet (240 mg total) by mouth every evening. 90 tablet 3    ergocalciferol (ERGOCALCIFEROL) 50,000 unit Cap Take 1 capsule (50,000 Units total) by mouth every 30 days. 3 capsule 1    irbesartan (AVAPRO) 300 MG tablet Take 1 tablet (300 mg total) by mouth once daily. (Patient taking differently: Take 300 mg by mouth every morning. ) 30 tablet 3    triamcinolone acetonide 0.1% (KENALOG) 0.1 % ointment AAA on arms, legs, and neck bid x 1-2 wks then prn flares only 80 g 3      Allergies: Chloral hydrate and Hydrocodone    Family History   Problem Relation Age of Onset    Hypertension Mother     Hypertension Father     Cancer Sister     Heart attack Maternal Uncle     Melanoma Neg Hx     Breast cancer Neg Hx     Colon cancer Neg Hx     Ovarian cancer Neg Hx      Social History     Tobacco Use    Smoking status: Never Smoker    Smokeless tobacco: Never Used   Substance Use Topics    Alcohol use: No    Drug use: No     Review of Systems    Respiratory: Negative for shortness of breath.    Cardiovascular: Negative for chest pain.   Gastrointestinal: Negative for abdominal pain.   Neurological: Negative for headaches.     Objective:     Vitals:    Temp: 97.8 °F (36.6 °C)  Pulse: 95  Rhythm: normal sinus rhythm  BP: (!) 158/91  MAP (mmHg): 117  Resp: 12  SpO2: 99 %  Oxygen Concentration (%): 28  O2 Device (Oxygen Therapy): nasal cannula    Temp  Min: 97.8 °F (36.6 °C)  Max: 97.9 °F (36.6 °C)  Pulse  Min: 95  Max: 111  BP  Min: 158/91  Max: 210/120  MAP (mmHg)  Min: 113  Max: 148  Resp  Min: 12  Max: 24  SpO2  Min: 97 %  Max: 100 %  Oxygen Concentration (%)  Min: 28  Max: 35    No intake/output data recorded.           Physical Exam   Constitutional: She is oriented to person, place, and time. She appears well-developed. No distress.   Thin female, no acute distress  Somnolent but easily arousable   HENT:   Head: Atraumatic.   Mouth/Throat: Oropharynx is clear and moist. No oropharyngeal exudate.   Noted sutures to R frontal forehead  L surgical drain in place   Eyes: Pupils are equal, round, and reactive to light. Conjunctivae and EOM are normal. No scleral icterus.   Neck: Normal range of motion. Neck supple.   Cardiovascular: Normal rate, regular rhythm and normal heart sounds.   No murmur heard.  Pulmonary/Chest: Effort normal and breath sounds normal. No respiratory distress. She has no wheezes.   Abdominal: Soft. Bowel sounds are normal. She exhibits no distension. There is no tenderness.   No grimace to palpation   Musculoskeletal: Normal range of motion. She exhibits no edema.   Neurological: She is alert and oriented to person, place, and time.   Neurologically intact  No gross CN deficits  Follows commands  Strength 5/5 throughout  No appreciable sensory deficits   Skin: Skin is warm and dry.   Nursing note and vitals reviewed.    Today I personally reviewed pertinent medications, imaging, notably:    L surgical drain    Assessment/Plan:      Neuro  Seizures  - Continue home medications: Keppra 500 mg BID, Vimpat 50 mg BID    Cardiac/Vascular  Renovascular hypertension  - On Verapamil, Irbasartan, Hydralazine, Coreg, Clonidine at home  - Will resume home Coreg 25 mg BID  - Continue cardene gtt w/ SBP goal <140  - Hydralazine PRN    Renal/  Hypocalcemia  - Corrected Ca 6.6  - Calcitriol 0.5 mcg BID  - Calcium carbonate 2000 mg TID    ESRD on hemodialysis  - ESRD w/ HD on MWF  - Consulted nephrology to continue HD schedule while inpatient; appreciate assistance    Immunology/Multi System  Immunosuppressed  - Stable  - See above    Hematology  Thrombocytopenia  - Platelets 56   - No active signs of bleeding  - Transfused 2 bags platelets  - Repeat CBC, CT head this pm  - Goal >100    Oncology  * Brain tumor  29 yo F w/ L calvarial lesion s/p excision and cranioplasty on 4/22/19. Well tolerated w/o acute complications. E4V5M6. Non-focal neuro exam. Post-op CT w/ surgical drain in place, L temporal emphysema and hematoma; mild mass effect, no midline shift.     Plan:  - Patient admitted to [facility]  - q1h neurochecks  - PRN oxycodone 5 mg q6 for moderate pain control  - Repeat CT head 6 hrs from post-op imaging  - Cardene gtt to maintain SBP <140   - Resume home coreg 25 mg BID  - Hydralazine, labetalol PRN  - Keppra 500mg BID  - Vimpat 50 mg BID  - Maintain Na >135  - HOB >35 degree at all times  - HV drain to full suction   - Cefazolin 2g daily while drain in place for ppx  - Advance diet as tolerated  - To notify NSG w/ any changes in neuro status    GI  Liver replaced by transplant  - S/p liver transplant 1991  - On tacrolimus 6 mg BID  - Consulted hepatology; appreciate assistance  - Continue home dose at this time  - Daily tacro level        The patient is being Prophylaxed for:  Venous Thromboembolism with: Mechanical  Stress Ulcer with: None  Ventilator Pneumonia with: none    Activity Orders          Diet Adult Regular (IDDSI Level 7): Regular  starting at 04/22 0931        Full Code    Raquel Batres MD  Neurocritical Care  [facility]

## 2019-04-22 NOTE — ASSESSMENT & PLAN NOTE
- S/p liver transplant 1991  - On tacrolimus 6 mg BID  - Consulted hepatology; appreciate assistance  - Continue home dose at this time  - Daily tacro level

## 2019-04-22 NOTE — ASSESSMENT & PLAN NOTE
- On Verapamil, Irbasartan, Hydralazine, Coreg, Clonidine at home  - Will resume home Coreg 25 mg BID  - Continue cardene gtt w/ SBP goal <140  - Hydralazine PRN

## 2019-04-22 NOTE — SIGNIFICANT EVENT
ICU Attending    Called to patient bedside for worsening examination  RUE paretic  More difficult to arouse  E2V3M5  STAT repeat head CT with worsening left ICH  Brain compression  MLS of 9mm L-->R  STAT Mannitol 50 g IV PUSH  Emergent intubation for airway securement  NSGY contacted and family updated at bedside  Already given PLTS when arrived up to [facility]  Repeat STAT 3 packs PLTS pending to bedside  2/3 given during intubation    Plan for Class A to OR  Discussed with Dr Powell at bedside      Additional CC time 55 minutes  Critical Care was time spent personally by me on the following activities: development of treatment plan with patient or surrogate and bedside caregivers, discussions with consultants, evaluation of patient's response to treatment, examination of patient, ordering and performing treatments and interventions, ordering and review of laboratory studies, ordering and review of radiographic studies, pulse oximetry, re-evaluation of patient's condition. This critical care time did not overlap with that of any other provider or involve time for any procedures.

## 2019-04-22 NOTE — PROGRESS NOTES
Pt intubated with a 7.0 ETT  Secured at 23 at the lip on documented settings. Ambu bag and mask at bedside will continue to monitor.

## 2019-04-22 NOTE — TRANSFER OF CARE
"Anesthesia Transfer of Care Note    Patient: Holly Patel    Procedure(s) Performed: Procedure(s) (LRB):  EXCISION, NEOPLASM, SKULL with Cranioplasty (Left)    Patient location: PACU    Anesthesia Type: general    Transport from OR: Transported from OR on 6-10 L/min O2 by face mask with adequate spontaneous ventilation    Post pain: adequate analgesia    Post assessment: no apparent anesthetic complications    Post vital signs: stable    Level of consciousness: awake    Nausea/Vomiting: no nausea/vomiting    Complications: none    Transfer of care protocol was followed      Last vitals:   Visit Vitals  BP (!) 210/120   Pulse 106   Temp 36.6 °C (97.9 °F) (Oral)   Resp 20   Ht 5' 5" (1.651 m)   Wt 50.8 kg (112 lb)   LMP  (LMP Unknown)   SpO2 99%   Breastfeeding? No   BMI 18.64 kg/m²     "

## 2019-04-22 NOTE — INTERVAL H&P NOTE
The patient has been examined and the H&P has been reviewed:    I concur with the findings and no changes have occurred since H&P was written.    Anesthesia/Surgery risks, benefits and alternative options discussed and understood by patient/family.          Active Hospital Problems    Diagnosis  POA    Brain tumor [D49.6]  Yes      Resolved Hospital Problems   No resolved problems to display.

## 2019-04-22 NOTE — SUBJECTIVE & OBJECTIVE
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 ()    Moderate protein-calorie malnutrition 2017    MRSA bacteremia 2017    Pneumonia     Prophylactic immunotherapy 2014    Renovascular hypertension 10/2/2015    Secondary hyperparathyroidism 2017    Seizures     Sialadenitis 3/21/2018    Thrombocytopenia 2016       Past Surgical History:   Procedure Laterality Date    BIOPSY, LIVER, TRANSJUGULAR APPROACH N/A 2018    Performed by [facility] OR 2ND FLR    BIOPSY-LIVER N/A 2017    Performed by [facility] OR 2ND FLR    BIOPSY-LIVER N/A 3/22/2016    Performed by [facility] OR 2ND FLR     SECTION      x 2    CONIZATION-CERVICAL-LEEP N/A 6/15/2018    Performed by [facility]quin MD at [facility] OR    [id number]; upper extremity Left 2015    Performed by Id[facility] at [facility] OR 2ND FLR    EMBOLIZATION, BLOOD VESSEL N/A 2018    Performed by [facility]ren Ramos MD at [facility] CATH LAB    Exam Under Anesthesia N/A 2018    Performed by [facility] OR 2ND FLR    Exam under anesthesia N/A 2018    Performed by [facility] MD at [facility] OR    Exam under anesthesia (ADD ON ) N/A 2018    Performed by NICKIE Alvarez MD at [facility] OR    Exam under anesthesia -cervical  suturing  N/A 7/26/2018    Performed by [facility]i [facility]s III, [facility] at [facility] OR    FISTULOGRAM Left 12/4/2015    Performed by Idalia Diaz MD at [facility] CATH LAB    LIVER BIOPSY      LIVER TRANSPLANT  09/1992    PARATHYROIDECTOMY, Minimally Invasive Bilateral Exploration Bilateral 3/27/2019    Performed by Ashlye Guallpa MD at [facility] OR 2ND FLR    SUTURE REPAIR,CERVIX  6/19/2018    Performed by [facility]am [facility] MD at [facility] OR    TUBAL LIGATION  2010       Review of patient's allergies indicates:   Allergen Reactions    Chloral hydrate Hallucinations     Other reaction(s): Hallucinations  Other reaction(s): Hives    Hydrocodone Other (See Comments)     Mental status changes     Current Facility-Administered Medications   Medication Frequency    0.9%  NaCl infusion (for blood administration) Q24H PRN    0.9%  NaCl infusion (for blood administration) Q24H PRN    0.9%  NaCl infusion (for blood administration) Q24H PRN    0.9%  NaCl infusion (for blood administration) Q24H PRN    [START ON 4/23/2019] ceFAZolin injection 2 g Daily    cefTRIAXone (ROCEPHIN) 2 g in dextrose 5 % 50 mL IVPB Q12H    lacosamide tablet 50 mg BID    levETIRAcetam tablet 500 mg BID    mupirocin 2 % ointment 1 g BID    niCARdipine (CARDENE) 40 mg/200 mL infusion     niCARdipine 40 mg/200 mL infusion Continuous    ondansetron disintegrating tablet 4 mg Q6H PRN    oxyCODONE immediate release tablet 5 mg Q6H PRN    sodium chloride 0.9% flush 10 mL PRN    tacrolimus capsule 6 mg BID     Family History     Problem Relation (Age of Onset)    Cancer Sister    Heart attack Maternal Uncle    Hypertension Mother, Father        Tobacco Use    Smoking status: Never Smoker    Smokeless tobacco: Never Used   Substance and Sexual Activity    Alcohol use: No    Drug use: No    Sexual activity: Never     Partners: Male     Review of Systems   Unable to perform ROS: Acuity of condition     Objective:     Vital Signs (Most  Recent):  Temp: 97.8 °F (36.6 °C) (04/22/19 1100)  Pulse: 100 (04/22/19 1400)  Resp: 20 (04/22/19 1400)  BP: (!) 146/70 (04/22/19 1400)  SpO2: 99 % (04/22/19 1400)  O2 Device (Oxygen Therapy): nasal cannula (04/22/19 1100) Vital Signs (24h Range):  Temp:  [97.8 °F (36.6 °C)-97.9 °F (36.6 °C)] 97.8 °F (36.6 °C)  Pulse:  [] 100  Resp:  [12-31] 20  SpO2:  [97 %-100 %] 99 %  BP: (137-210)/() 146/70     Weight: 54.8 kg (120 lb 13 oz) (04/22/19 1100)  Body mass index is 22.1 kg/m².  Body surface area is 1.55 meters squared.    No intake/output data recorded.    Physical Exam   Constitutional: She is oriented to person, place, and time. She appears well-developed. She appears lethargic. No distress.   HENT:   Right Ear: External ear normal.   Left Ear: External ear normal.   Drain in place near L temporal region   Eyes: Conjunctivae and EOM are normal. Right eye exhibits no discharge. Left eye exhibits no discharge.   Neck: Normal range of motion. Neck supple.   Cardiovascular: Regular rhythm. Tachycardia present. Exam reveals no gallop and no friction rub.   No murmur heard.  Pulmonary/Chest: Effort normal and breath sounds normal. No respiratory distress. She has no wheezes. She has no rales.   Abdominal: Soft. Bowel sounds are normal. She exhibits no distension. There is no tenderness.   Musculoskeletal: Normal range of motion. She exhibits no edema or deformity.   Neurological: She is oriented to person, place, and time. She appears lethargic.   Responds appropriately   Skin: Skin is warm and dry. She is not diaphoretic.   Psychiatric: She has a normal mood and affect. Her behavior is normal.       Significant Labs:  CBC:   Recent Labs   Lab 04/22/19  0629   WBC 3.61*   RBC 2.82*   HGB 7.5*   HCT 23.4*   PLT 56*   MCV 83   MCH 26.6*   MCHC 32.1     CMP:   Recent Labs   Lab 04/22/19  1219   *   CALCIUM 6.4*   ALBUMIN 3.2*   PROT 8.3      K 4.6   CO2 19*      BUN 46*   CREATININE 7.8*    ALKPHOS 722*   ALT 36   AST 46*   BILITOT 0.8

## 2019-04-22 NOTE — PLAN OF CARE
Problem: Adult Inpatient Plan of Care  Goal: Plan of Care Review  Outcome: Ongoing (interventions implemented as appropriate)  POC reviewed with pt and family at 1300.   Pt verbalized understanding.   Questions and concerns addressed.   See flowsheets for full assessment and VS info.     Cardene for SBP < 140.  Hemovac to full suction.

## 2019-04-22 NOTE — ANESTHESIA PREPROCEDURE EVALUATION
[facility]  Anesthesia Pre-Operative Evaluation         Patient Name: Holly Patel  YOB: 1990  MRN: [id number]    SUBJECTIVE:     Pre-operative evaluation for Procedure(s) (LRB):  CRANIOTOMY-- left crani for clot evac with microscrope (Left)     04/22/2019    Holly Patel is a 28 y.o. female w/ a significant PMHx of liver transplant in 1991, recent thyroidectomy on 3/27/2019, ESRD on HD (MWF), and epilepsy admitted to [facility] for higher level of care following planned surgical excision of L calvarial lesion s/p excision and cranioplasty 4/22/19.  Patient with acute change in neuro exam post-op. CT head showed left temporal hemorrhage at prior craniectomy site increased in size and there is corresponding surrounding edema/mass effect.  There is mass effect on left lateral ventricles with corresponding midline shift.  Imaging reviewed with Dr. Powell.  Plan for class A for craniotomy for clot evacuation.     Patient intubated in ICU.    Patient now presents for the above procedure(s).    R AC 20g, R radial a-line    LDA:        Peripheral IV - Single Lumen 20 G Right Wrist (Active)   Site Assessment Clean;Dry;Intact;No redness;No swelling 4/22/2019  3:00 PM   Line Status Flushed 4/22/2019  3:00 PM   Dressing Status Clean;Dry;Intact 4/22/2019  3:00 PM   Dressing Intervention Dressing reinforced 4/22/2019  3:00 PM   Dressing Change Due 04/26/17 4/22/2019  3:00 PM   Site Change Due 04/26/17 4/22/2019 11:00 AM   Reason Not Rotated Not due 4/22/2019  3:00 PM   Number of days:             Peripheral IV - Single Lumen 04/22/19 Posterior;Right Hand (Active)   Site Assessment No redness;Clean;Dry;No swelling;Intact 4/22/2019  3:00 PM   Line Status Flushed 4/22/2019  3:00 PM   Dressing Status Clean;Dry;Intact 4/22/2019  3:00 PM   Dressing Intervention Dressing reinforced 4/22/2019  3:00 PM   Dressing Change Due 04/26/19 4/22/2019 10:42 AM   Site Change Due 04/26/19 4/22/2019 10:42  AM   Reason Not Rotated Not due 4/22/2019 10:42 AM   Number of days: 0            Peripheral IV - Single Lumen 04/22/19 1656 20 G Right Antecubital (Active)   Number of days: 0            Peripheral IV - Single Lumen 04/22/19 1657 20 G Anterior;Distal;Right Ankle (Active)   Number of days: 0            Arterial Line 04/22/19 1659 Right Radial (Active)   Number of days: 0            Closed/Suction Drain 04/22/19 Left Other (Comment) Accordion 10 Fr. (Active)   Site Description Unable to view 4/22/2019  3:00 PM   Dressing Type [place] 4/22/2019  3:00 PM   Dressing Status Clean;Dry;Intact 4/22/2019  3:00 PM   Drainage Bloody 4/22/2019  3:00 PM   Status Other (Comment) 4/22/2019  3:00 PM   Number of days: 0            Hemodialysis AV Fistula Left forearm (Active)   Needle Size Buttonhole 4/2/2019 11:15 AM   Site Assessment Clean;Dry;Intact;No redness;No swelling 4/22/2019  3:00 PM   Patency Thrill;Bruit;Present 4/22/2019  3:00 PM   Status Deaccessed 4/22/2019  3:00 PM   Flows Good 4/2/2019 11:15 AM   Dressing Intervention New dressing 3/30/2019 11:03 AM   Dressing Status Clean;Dry;Intact 4/2/2019 11:15 AM   Site Condition No complications 4/2/2019 11:15 AM   Dressing Occlusive 4/2/2019 11:15 AM   Drainage Description Other (Comment) 4/14/2018  5:26 PM   Number of days:      Drips:   Cardene 15    Patient Active Problem List   Diagnosis    Liver replaced by transplant    Prophylactic immunotherapy    Acute nonintractable headache    Hypertensive urgency    ESRD on hemodialysis    Renovascular hypertension    Acute blood loss anemia    Anemia in ESRD (end-stage renal disease)    Non compliance w medication regimen    ESRD on dialysis    Immunosuppressed    Secondary hyperparathyroidism    Iron deficiency anemia secondary to inadequate dietary iron intake    Moderate protein-calorie malnutrition    Uncontrolled hypertension    Seizures    Hypervolemia    Weight loss, unintentional     Thrombocytopenia    Leukopenia    Chronic fatigue    Anemia of unknown etiology    Depression    Symptomatic anemia    Vaginal atrophy with desquamative inflammatory vaginitis    Hyponatremia    Hypertensive retinopathy of both eyes, grade 3    Elevated PTHrP level    Chronic kidney disease-mineral and bone disorder    Abnormal LFTs    Non-intractable vomiting with nausea    Brain lesion    Seizure-like activity    Gastroenteritis    Hyperparathyroidism    Hyperparathyroidism due to end stage renal disease on dialysis    Hypocalcemia    Hypophosphatemia    Brain tumor       Review of patient's allergies indicates:   Allergen Reactions    Chloral hydrate Hallucinations     Other reaction(s): Hallucinations  Other reaction(s): Hives    Hydrocodone Other (See Comments)     Mental status changes       Current Inpatient Medications:   calcitRIOL  0.5 mcg Oral BID    calcium carbonate  2,000 mg Oral TID    carvedilol  25 mg Oral BID    [START ON 4/23/2019] ceFAZolin (ANCEF) IVPB  2 g Intravenous Daily    cefTRIAXone (ROCEPHIN) IVPB  2 g Intravenous Q12H    etomidate        lacosamide  50 mg Oral BID    levETIRAcetam  500 mg Oral BID    mannitol 20%  50 g Intravenous Once    mupirocin  1 g Nasal BID    niCARdipine        rocuronium        tacrolimus  6 mg Oral BID       No current facility-administered medications on file prior to encounter.      Current Outpatient Medications on File Prior to Encounter   Medication Sig Dispense Refill    calcitRIOL (ROCALTROL) 0.5 MCG Cap Take 2 capsules (1 mcg total) by mouth 2 (two) times daily. 60 capsule 1    calcium carbonate (OS-WOLFGANG) 500 mg calcium (1,250 mg) tablet Take 4 tablets (2,000 mg total) by mouth 3 (three) times daily with meals.  0    carvedilol (COREG) 25 MG tablet Take 1 tablet (25 mg total) by mouth 2 (two) times daily. 60 tablet 11    cloNIDine (CATAPRES) 0.1 MG tablet Take 1 tablet (0.1 mg total) by mouth 2 (two) times daily. 60  tablet 1    dicyclomine (BENTYL) 20 mg tablet Take 1 tablet (20 mg total) by mouth every 6 (six) hours. 30 tablet 0    famotidine (PEPCID) 40 MG tablet Take 1 tablet (40 mg total) by mouth every morning. 30 tablet 11    hydrALAZINE (APRESOLINE) 100 MG tablet Take 1 tablet (100 mg total) by mouth every 8 (eight) hours. 90 tablet 11    lacosamide (VIMPAT) 50 mg Tab Take 1 tablet (50 mg total) by mouth 2 (two) times daily. 60 tablet 11    levETIRAcetam (KEPPRA) 500 MG Tab Take 1 tablet (500 mg total) by mouth 2 (two) times daily. 60 tablet 11    tacrolimus (PROGRAF) 1 MG Cap Take 6 capsules (6 mg total) by mouth every 12 (twelve) hours. 360 capsule 11    verapamil (CALAN-SR) 240 MG CR tablet Take 1 tablet (240 mg total) by mouth every evening. 90 tablet 3    ergocalciferol (ERGOCALCIFEROL) 50,000 unit Cap Take 1 capsule (50,000 Units total) by mouth every 30 days. 3 capsule 1    irbesartan (AVAPRO) 300 MG tablet Take 1 tablet (300 mg total) by mouth once daily. (Patient taking differently: Take 300 mg by mouth every morning. ) 30 tablet 3    triamcinolone acetonide 0.1% (KENALOG) 0.1 % ointment AAA on arms, legs, and neck bid x 1-2 wks then prn flares only 80 g 3       Past Surgical History:   Procedure Laterality Date    BIOPSY, LIVER, TRANSJUGULAR APPROACH N/A 2018    [facility] OR 2ND FLR    BIOPSY-LIVER N/A 2017    Performed by [facility] OR [facility]ND [facility]LR    BIOPSY-LIVER N/A 3/22/2016    Performed by [facility] OR 2ND FLR     SECTION      x 2    CONIZATION-CERVICAL-LEEP N/A 6/15/2018    Performed by [facility] at [facility] OR    [id number]; upper extremity Left 2015    Performed by Idalia Diaz MD at [facility] OR 2ND FLR    EMBOLIZATION, BLOOD VESSEL N/A 2018    Performed by Aren Ramos [facility] at [facility] CATH LAB    Exam Under Anesthesia N/A 2018    Performed by Neelam  [facility] at [facility] OR 2ND FLR    Exam under anesthesia N/A 6/19/2018    Performed by [facility] at [facility] OR    Exam under anesthesia (ADD ON ) N/A 7/9/2018    Performed by NICKIE Alvarez MD at [facility] OR    Exam under anesthesia -cervical suturing  N/A 7/26/2018    Performed by Lei Sims III, MD at [facility] OR    FISTULOGRAM Left 12/4/2015    Per[facility] by [facility] at [facility] CATH LAB    LIVER BIOPSY      LIVER TRANSPLANT  09/1992    PARATHYROIDECTOMY, Minimally Invasive Bilateral Exploration Bilateral 3/27/2019    Performed by [facility]pa [facility] at [facility] OR 2ND FLR    SUTURE REPAIR,CERVIX  6/19/2018    Performed by [facility] at [facility] OR    TUBAL LIGATION  2010       Social History     Socioeconomic History    Marital status: Legally      Spouse name: Not on file    Number of children: Not on file    Years of education: Not on file    Highest education level: Not on file   Occupational History    Not on file   Social Needs    Financial resource strain: Not on file    Food insecurity:     Worry: Not on file     Inability: Not on file    Transportation needs:     Medical: Not on file     Non-medical: Not on file   Tobacco Use    Smoking status: Never Smoker    Smokeless tobacco: Never Used   Substance and Sexual Activity    Alcohol use: No    Drug use: No    Sexual activity: Never     Partners: Male   Lifestyle    Physical activity:     Days per week: Not on file     Minutes per session: Not on file    Stress: Not on file   Relationships    Social connections:     Talks on phone: Not on file     Gets together: Not on file     Attends [employer] service: Not on file     Active member of club or organization: Not on file     Attends meetings of clubs or organizations: Not on file     Relationship status: Not on file   Other Topics Concern    Are you pregnant or think you may be? No    Breast-feeding No   Social History Narrative    Lives 2  kids, 7 and 8, and nephew. Her mom helps with kids.       OBJECTIVE:     Vital Signs Range (Last 24H):  Temp:  [36.6 °C (97.8 °F)-36.7 °C (98 °F)]   Pulse:  []   Resp:  [12-35]   BP: (137-210)/()   SpO2:  [97 %-100 %]   Arterial Line BP: (198)/(82)       Significant Labs:  Lab Results   Component Value Date    WBC 3.61 (L) 04/22/2019    HGB 7.5 (L) 04/22/2019    HCT 23.4 (L) 04/22/2019    PLT 56 (L) 04/22/2019    CHOL 215 (H) 09/26/2018    TRIG 52 09/26/2018    HDL 67 09/26/2018    ALT 36 04/22/2019    AST 46 (H) 04/22/2019     04/22/2019    K 4.6 04/22/2019     04/22/2019    CREATININE 7.8 (H) 04/22/2019    BUN 46 (H) 04/22/2019    CO2 19 (L) 04/22/2019    TSH 2.623 11/15/2018    INR 1.1 04/17/2019    HGBA1C 4.1 09/20/2018     CTH  Interval enlargement of left temporal intraparenchymal hematoma underlying the craniectomy site which now demonstrates intraventricular extension of hemorrhage.  Significant increased intracranial mass effect with sulcal and cisternal effacement, midline shift and ventricular trapping.    EKG: NSR    DST  EKG Conclusions:    1. The EKG portion of this study is negative for ischemia at a peak heart rate of 142 bpm (78% of predicted).   2. Blood pressure remained stable throughout the protocol  (Presenting BP: 147/83 Peak BP: 175/85).   3. The following arrhythmias were present: rare PVCs.   4. There were no symptoms of chest discomfort or significant dyspnea throughout the protocol.     No dobutamine induced wall motion abnormalities were identified.     CONCLUSIONS     1 - Severe left atrial enlargement.     2 - Severe concentric hypertrophy.     3 - Normal left ventricular systolic function (EF 60-65%).     4 - Impaired LV relaxation, elevated LAP (grade 2 diastolic dysfunction).     5 - Normal right ventricular systolic function .     6 - Normal right ventricular systolic function .     7 - Mild to moderate mitral regurgitation.     8 - The estimated PA systolic  pressure is 40 mmHg.     9 - Suspect a myocardial infiltrative process.     No evidence of stress induced myocardial ischemia.     ASSESSMENT/PLAN:       Anesthesia Evaluation    I have reviewed the Patient Summary Reports.    I have reviewed the Nursing Notes.   I have reviewed the Medications.     Review of Systems  Anesthesia Hx:  No problems with previous Anesthesia  History of prior surgery of interest to airway management or planning: Denies Family Hx of Anesthesia complications.   Denies Personal Hx of Anesthesia complications.   Social:  No Alcohol Use, Non-Smoker    Hematology/Oncology:     Oncology Normal    -- Anemia: Iron Deficiency Anemia Anemia of Chronic Kidney Disease Hematology Comments: THROMBOCYTOPENIA    EENT/Dental:   Eyes: htn retinopathy   Cardiovascular:  Functional Capacity 4 METS, able to climb 2 flights of stairs  Hypertension , Recent typical clinic B/P of 118/82    Pulmonary:  Pulmonary Normal  Denies Shortness of breath.  Denies Recent URI.    Renal/:  Kidney Function/Disease  Dialysis Dependent, T-TH-S, chronic hemodialysis Last Dialyzed Saturday   Hepatic/GI:  Liver Disease Liver transplant 9/1992   Musculoskeletal:  Musculoskeletal General/Symptoms: Functional capacity is ambulatory without assistance.    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          Physical Exam  General:  Well nourished    Airway/Jaw/Neck:  Airway Findings: Tongue: Normal Pre-Existing Airway Tube(s): Oral Endotracheal tube  Size: 7.0  General Airway Assessment: Adult  Mallampati: II  TM Distance: 4 - 6 cm  Jaw/Neck Findings:  Neck ROM: Normal ROM      Dental:  Dental Findings:     Chest/Lungs:  Chest/Lungs Clear    Heart/Vascular:  Heart Findings: Normal       Mental Status:  Mental Status Findings:  Somnolent         Anesthesia Plan  Type of Anesthesia, risks & benefits discussed:  Anesthesia Type:  general  Patient's Preference:   Intra-op Monitoring Plan: arterial line and standard ASA monitors  Intra-op Monitoring Plan Comments:   Post Op Pain Control Plan: multimodal analgesia, per primary service following discharge from PACU and IV/PO Opioids PRN  Post Op Pain Control Plan Comments:   Induction:   IV  Beta Blocker:         Informed Consent: Patient representative understands risks and agrees with Anesthesia plan.  Questions answered. Anesthesia consent signed with patient representative.  ASA Score: 4  emergent   Day of Surgery Review of History & Physical:    H&P update referred to the surgeon.         Ready For Surgery From Anesthesia Perspective.

## 2019-04-22 NOTE — NURSING
Patient arrived to [facility] from PACU    Type of stroke/diagnosis:  L crani for neoplasm, hemorrhage on follow up CT    TPA start and end time (if applicable)    Thrombectomy start and end time (if applicable)    Current symptoms:     Skin assessment done: yes   Wounds noted:none    NCC notified: Kellie Block NP

## 2019-04-22 NOTE — SUBJECTIVE & OBJECTIVE
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 ()    Moderate protein-calorie malnutrition 2017    MRSA bacteremia 2017    Pneumonia     Prophylactic immunotherapy 2014    Renovascular hypertension 10/2/2015    Secondary hyperparathyroidism 2017    Seizures     Sialadenitis 3/21/2018    Thrombocytopenia 2016     Past Surgical History:   Procedure Laterality Date    BIOPSY, LIVER, TRANSJUGULAR APPROACH N/A 2018    Performed by [facility] OR 2ND FLR    BIOPSY-LIVER N/A 2017    Performed by [facility] OR 2ND FLR    BIOPSY-LIVER N/A 3/22/2016    Performed by [facility] OR 2ND FLR     SECTION      x 2    CONIZATION-CERVICAL-LEEP N/A 6/15/2018    Performed by [facility] at [facility] OR    [id number]; upper extremity Left 2015    Performed by Idalia Diaz MD at [facility] OR 2ND FLR    EMBOLIZATION, BLOOD VESSEL N/A 2018    Performed by Aren Ramos MD at [facility] CATH LAB    Exam Under Anesthesia N/A 2018    Performed by [facility]am [facility]quin MD at [facility] OR 2ND FLR    Exam under anesthesia N/A 2018    Performed by [facility]am [facility] at [facility] OR    Exam under anesthesia (ADD ON ) N/A 2018    Performed by NICKIE Alvarez MD at [facility] OR    Exam under anesthesia -cervical  suturing  N/A 7/26/2018    Performed by Lei Sims III, [facility] at [facility] [place] [place]    FISTULOGRAM Left 12/4/2015    Performed by [facility] at [facility] CATH LAB    LIVER BIOPSY      LIVER TRANSPLANT  09/1992    PARATHYROIDECTOMY, Minimally Invasive Bilateral Exploration Bilateral 3/27/2019    Performed by Ashley Guallpa MD at [facility] OR 2ND FLR    SUTURE REPAIR,CERVIX  6/19/2018    Performed by Neelam Marroquin MD at [facility] [place] OR    TUBAL LIGATION  2010      No current facility-administered medications on file prior to encounter.      Current Outpatient Medications on File Prior to Encounter   Medication Sig Dispense Refill    calcitRIOL (ROCALTROL) 0.5 MCG Cap Take 2 capsules (1 mcg total) by mouth 2 (two) times daily. 60 capsule 1    calcium carbonate (OS-WOLFGANG) 500 mg calcium (1,250 mg) tablet Take 4 tablets (2,000 mg total) by mouth 3 (three) times daily with meals.  0    carvedilol (COREG) 25 MG tablet Take 1 tablet (25 mg total) by mouth 2 (two) times daily. 60 tablet 11    cloNIDine (CATAPRES) 0.1 MG tablet Take 1 tablet (0.1 mg total) by mouth 2 (two) times daily. 60 tablet 1    dicyclomine (BENTYL) 20 mg tablet Take 1 tablet (20 mg total) by mouth every 6 (six) hours. 30 tablet 0    famotidine (PEPCID) 40 MG tablet Take 1 tablet (40 mg total) by mouth every morning. 30 tablet 11    hydrALAZINE (APRESOLINE) 100 MG tablet Take 1 tablet (100 mg total) by mouth every 8 (eight) hours. 90 tablet 11    lacosamide (VIMPAT) 50 mg Tab Take 1 tablet (50 mg total) by mouth 2 (two) times daily. 60 tablet 11    levETIRAcetam (KEPPRA) 500 MG Tab Take 1 tablet (500 mg total) by mouth 2 (two) times daily. 60 tablet 11    tacrolimus (PROGRAF) 1 MG Cap Take 6 capsules (6 mg total) by mouth every 12 (twelve) hours. 360 capsule 11    verapamil (CALAN-SR) 240 MG CR tablet Take 1 tablet (240 mg total) by mouth every evening. 90 tablet 3    ergocalciferol (ERGOCALCIFEROL) 50,000 unit Cap Take 1 capsule (50,000  Units total) by mouth every 30 days. 3 capsule 1    irbesartan (AVAPRO) 300 MG tablet Take 1 tablet (300 mg total) by mouth once daily. (Patient taking differently: Take 300 mg by mouth every morning. ) 30 tablet 3    triamcinolone acetonide 0.1% (KENALOG) 0.1 % ointment AAA on arms, legs, and neck bid x 1-2 wks then prn flares only 80 g 3      Allergies: Chloral hydrate and Hydrocodone    Family History   Problem Relation Age of Onset    Hypertension Mother     Hypertension Father     Cancer Sister     Heart attack Maternal Uncle     Melanoma Neg Hx     Breast cancer Neg Hx     Colon cancer Neg Hx     Ovarian cancer Neg Hx      Social History     Tobacco Use    Smoking status: Never Smoker    Smokeless tobacco: Never Used   Substance Use Topics    Alcohol use: No    Drug use: No     Review of Systems   Respiratory: Negative for shortness of breath.    Cardiovascular: Negative for chest pain.   Gastrointestinal: Negative for abdominal pain.   Neurological: Negative for headaches.     Objective:     Vitals:    Temp: 97.8 °F (36.6 °C)  Pulse: 95  Rhythm: normal sinus rhythm  BP: (!) 158/91  MAP (mmHg): 117  Resp: 12  SpO2: 99 %  Oxygen Concentration (%): 28  O2 Device (Oxygen Therapy): nasal cannula    Temp  Min: 97.8 °F (36.6 °C)  Max: 97.9 °F (36.6 °C)  Pulse  Min: 95  Max: 111  BP  Min: 158/91  Max: 210/120  MAP (mmHg)  Min: 113  Max: 148  Resp  Min: 12  Max: 24  SpO2  Min: 97 %  Max: 100 %  Oxygen Concentration (%)  Min: 28  Max: 35    No intake/output data recorded.           Physical Exam   Constitutional: She is oriented to person, place, and time. She appears well-developed. No distress.   Thin female, no acute distress  Somnolent but easily arousable   HENT:   Head: Atraumatic.   Mouth/Throat: Oropharynx is clear and moist. No oropharyngeal exudate.   Noted sutures to R frontal forehead  L surgical drain in place   Eyes: Pupils are equal, round, and reactive to light. Conjunctivae and EOM are  normal. No scleral icterus.   Neck: Normal range of motion. Neck supple.   Cardiovascular: Normal rate, regular rhythm and normal heart sounds.   No murmur heard.  Pulmonary/Chest: Effort normal and breath sounds normal. No respiratory distress. She has no wheezes.   Abdominal: Soft. Bowel sounds are normal. She exhibits no distension. There is no tenderness.   No grimace to palpation   Musculoskeletal: Normal range of motion. She exhibits no edema.   Neurological: She is alert and oriented to person, place, and time.   Neurologically intact  No gross CN deficits  Follows commands  Strength 5/5 throughout  No appreciable sensory deficits   Skin: Skin is warm and dry.   Nursing note and vitals reviewed.    Today I personally reviewed pertinent medications, imaging, notably:    L surgical drain

## 2019-04-22 NOTE — NURSING
1600: Pt no longer moving right upper extremity.  Pt more lethargic than previous exams, and no longer answering orientation questions.  STAT CT ordered.    1630: STAT CT completed, NCC at bedside to push mannitol and place emergent arterial line.    1700: Decision was made by MD Neo to emergently intubate patient, respiratory at bedside, RSI initiated.    1730: Pt taken to OR for Class A hemicrani, care resumed by anesthesia staff, chart sent with pt, with cardene and platelets infusing.  Anesthesia staff notified of critical ionized calcium value.

## 2019-04-22 NOTE — HPI
27 yo F w/ PMH significant for liver transplant in 1991, recent thyroidectomy on 3/27/2019, ESRD on HD (MWF), and epilepsy who presents to [facility] for higher level of care following planned surgical excision of L calvarial lesion s/p excision and cranioplasty 4/22/19. The pt presented to [facility] on 03/12/2019 with a complaint of headaches, among other symptoms, and received a workup that included a CT head which showed a left temporal calvarium lesion with mass effect. At that time she shared that she continued to have a headache, noting the pain was usually over the left temporal aspect of her head but migrated over to the right temporal aspect. She states the pain on the right is exacerbated with palpation of the area. She has no additional symptomatic complaints at this time. Currently stable following surgery. Denies acute pain, otherwise comfortable w/ non-focal neurological exam.

## 2019-04-22 NOTE — ASSESSMENT & PLAN NOTE
- Platelets 56   - No active signs of bleeding  - Transfused 2 bags platelets  - Repeat CBC, CT head this pm  - Goal >100

## 2019-04-22 NOTE — HPI
Ms. Holly Patel is a 27 yo AAF with Tertiary hyperparathyroidism s/p partial parathyroidectomy, HTN, s/p OLTx in 2012, and ESRD on iHD TTS, and known history of non-compliance with medication regimen who is now s/p surgical excision of L calvarial lesion s/p excision and cranioplasty 4/22/19.  The pt presented to [facility] on 03/12/2019 with a complaint of headaches, among other symptoms, and received a workup that included a CTH which showed a left temporal calvarium lesion with mass effect.  She continued to suffer from headaches and NSGY scheduled patient for excision and cranioplasy.  She tolerated surgery well and was transferred to the [facility] for further monitoring.  Nephrology has been consulted for ESRD management while in-patient.     She normally  dialyzes at [facility] on MWF under the care of Dr. Bass.  She dialyzes for 3.5 hours and reports an EDW ~ 50 kg.  She has an AVF to her LFA.  She no longer makes urine.  Her last HD treatment was on Saturday (4/20).

## 2019-04-22 NOTE — PLAN OF CARE
A 28 old female with L calvarial lesion now s/p excision and cranioplasty. She tolerated surgery well without complication. Extubated. Postop exam: Awaking, E4V5M6. MEMBRENO with good strength. Non focal. FCX4. Follow up CT with L temporal IPH.      Plan:  -Patient admitted to [facility]      -q1h neurochecks  --Pain control  --Follow-up CT head in 6hrs  --SBP <140 (cardene ggt; hydralazine & labetalol PRN; transition to home meds when appropriate)  --Keppra 500mg BID  --Na >135  --HOB >35 degree at all time  --Follow up wound   --HV drain to full suction   --PPx abx while drain in place   --Okay for CLD and advance as tolerated   --Notify NSGY immediately with any changes in neuro status     Kimberly Roy MD  NSGY PGY-II

## 2019-04-22 NOTE — PROCEDURES
"Holly Patel is a 28 y.o. female patient.    Temp: 98 °F (36.7 °C) (04/22/19 1500)  Pulse: 103 (04/22/19 1500)  Resp: (!) 24 (04/22/19 1500)  BP: (!) 140/78 (04/22/19 1500)  SpO2: 100 % (04/22/19 1500)  Weight: 54.8 kg (120 lb 13 oz) (04/22/19 1100)  Height: 5' 2" (157.5 cm) (04/22/19 1100)       Arterial Line  Date/Time: 4/22/2019 5:02 PM  Location procedure was performed: [facility] NEURO CRITICAL CARE  Performed by: Kellie Block NP  Authorized by: Kellie Block NP   Consent Done: Emergent Situation  Preparation: Patient was prepped and draped in the usual sterile fashion.  Indications: multiple ABGs and hemodynamic monitoring  Location: right radial  Patient sedated: no  Lencho's test normal: yes  Needle gauge: 20  Seldinger technique: Seldinger technique used  Number of attempts: 1  Complications: No  Specimens: No  Implants: No  Post-procedure: dressing applied  Patient tolerance: Patient tolerated the procedure well with no immediate complications          Kellie Block  4/22/2019  "

## 2019-04-22 NOTE — NURSING TRANSFER
Nursing Transfer Note      4/22/2019     Transfer To: [id number]    Transfer via stretcher    Transfer with cardiac monitoring    Transported by RN    Medicines sent: Cardene    Chart send with patient: Yes    Notified: mom    Patient reassessed at: 4/22/19    Upon arrival to floor:

## 2019-04-22 NOTE — BRIEF OP NOTE
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]     * Pre-op testing [Z01.818]     * Renovascular hypertension [I15.0]     * Uncontrolled hypertension [I10]     * Immunosuppression [D89.9]     * Liver replaced by transplant [Z94.4]     * Abnormal LFTs [R94.5]    Procedure(s) (LRB):  EXCISION, NEOPLASM, SKULL with Cranioplasty (Left)    Anesthesia: General    Description of Procedure: Excision of L temporal/skull lesion and cranioplasty     Description of the findings of the procedure: Please see full op note for more details     Estimated Blood Loss: minimal         Specimens:   Specimen (12h ago, onward)    Start     Ordered    04/22/19 0814  Specimen to Pathology - Surgery  Once     Comments:  Pre-op Diagnosis: 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

## 2019-04-22 NOTE — PLAN OF CARE
04/22/19 1536   Discharge Assessment   Assessment Type Discharge Planning Assessment   Confirmed/corrected address and phone number on facesheet? Yes   Assessment information obtained from? Caregiver  (mother)   Expected Length of Stay (days) 5   Communicated expected length of stay with patient/caregiver yes   Prior to hospitilization cognitive status: Alert/Oriented   Prior to hospitalization functional status: Independent   Current cognitive status: Unable to Assess   Current Functional Status: Needs Assistance   Lives With parent(s);child(ester), dependent   Able to Return to Prior Arrangements yes   Is patient able to care for self after discharge? Unable to determine at this time (comments)   Who are your caregiver(s) and their phone number(s)? Theo Randolph [phone]   Patient's perception of discharge disposition other (comments)  (tabatha)   Readmission Within the Last 30 Days current reason for admission unrelated to previous admission   If yes, most recent facility name: [facility]   Patient currently receives home health services? No   Patient currently receives any other outside agency services? No   Equipment Currently Used at Home none   Do you have any problems affording any of your prescribed medications? No   Is the patient taking medications as prescribed? yes   Does the patient have transportation home? Yes   Transportation Anticipated family or friend will provide   Dialysis Name and Scheduled days [facility] M-W-F    Does the patient receive services at the [facility]? No   Discharge Plan A Home with family   Discharge Plan B Rehab   DME Needed Upon Discharge  other (see comments)  (tbd)   Patient/Family in Agreement with Plan yes   Does the patient currently use HME? No   Does the patient receive outpatient dialysis? Yes   Readmission Questionnaire   At the time of your discharge, did someone talk to you about what your health problems were? Yes   At the time of discharge, did  someone talk to you about what to watch out for regarding worsening of your health problem? Yes   At the time of discharge, did someone talk to you about what to do if you experienced worsening of your health problem? Yes   At the time of discharge, did someone talk to you about which medication to take when you left the hospital and which ones to stop taking? Yes   At the time of discharge, did someone talk to you about when and where to follow up with a doctor after you left the hospital? Yes   How often do you need to have someone help you when you read instructions, pamphlets, or other written material from your doctor or pharmacy? Never   Do you have problems taking your medications as prescribed? No   Do you have any problems affording any of  your prescribed medications? No   Does the patient have transportation to healthcare appointments? Yes   Living Arrangements house   Does your caregiver provide all the help you need?   (tabatha)   Are you currently feeling confused?   (tabatha)   Are you currently having problems thinking?   (tabatha)   Are you currently having memory problems?   (tabatha)   Have you felt down, depressed, or hopeless? Unable to Assess   Have you felt little interest or pleasure in doing things? Unable to assess   In the last 7 days, my sleep quality was: poor         Discharge/ My Health Packet Folder Given to patient/family:      yes      PCP:  Stan Sosa MD        Pharmacy:    [facility]/pharm[facility][id number] - [place] - [address]  [address]  [place] [address]  Phone: [phone] Fax: [phone]    [facility]  [address]  [facility] [address]  Phone: [phone] Fax: [phone]        Emergency Contacts:  Extended Emergency Contact Information  Primary Emergency Contact: Myrna Randolph  Address: [address] [place] [address] [place] [address] [facility]  Home Phone: [phone]  Mobile Phone: [phone]  Relation: Mother  Secondary Emergency Contact:  River oGnzales   [facility]  Home Phone: [phone]  Mobile Phone: [phone]  Relation: Father  Preferred language: English   needed? No      Insurance:  Payor: MEDICARE / Plan: MEDICARE PART A & B / Product Type: Government /     Delma Mercado RN, CCRN-K, [facility]  Neuro-Critical Care   X [phone]

## 2019-04-22 NOTE — ANESTHESIA POSTPROCEDURE EVALUATION
Anesthesia Post Evaluation    Patient: Holly Patel    Procedure(s) Performed: Procedure(s) (LRB):  EXCISION, NEOPLASM, SKULL with Cranioplasty (Left)    Final Anesthesia Type: general  Patient location during evaluation: PACU  Patient participation: Yes- Able to Participate  Level of consciousness: awake and alert and oriented  Post-procedure vital signs: reviewed and stable  Pain management: adequate  Airway patency: patent  PONV status at discharge: No PONV  Anesthetic complications: no      Cardiovascular status: blood pressure returned to baseline  Respiratory status: unassisted and spontaneous ventilation  Hydration status: euvolemic  Follow-up not needed.          Vitals Value Taken Time   /90 4/22/2019 10:34 AM   Temp 36.6 °C (97.9 °F) 4/22/2019  5:35 AM   Pulse 101 4/22/2019 10:44 AM   Resp 52 4/22/2019 10:44 AM   SpO2 99 % 4/22/2019 10:44 AM   Vitals shown include unvalidated device data.      No case tracking events are documented in the log.      Pain/Bladimir Score: Pain Rating Prior to Med Admin: 3 (4/22/2019  9:50 AM)  Bladimir Score: 8 (4/22/2019 10:15 AM)    Patient being transferred to Neuro  ICU per primary team

## 2019-04-22 NOTE — PROGRESS NOTES
Neurosurgery was called at 4:00 p.m. to notify of patient's acute neuro change within last 15 mins.  Patient no longer moving right upper extremity but was previously following commands and verbalizing minimally at 3:00 p.m. vitals check per nursing staff.  Neurosurgery was immediately at bedside and examined patient.  Patient was restless, not following commands, moving all extremities spontaneously except for right upper extremity.  She does not respond to painful stimuli and right upper extremity.  Pupils equal reactive briskly bilaterally.  Stat CT head was ordered.  CT head showed left temporal hemorrhage at prior craniectomy site increased in size and there is corresponding surrounding edema/mass effect.  There is mass effect on left lateral ventricles with corresponding midline shift.  Imaging reviewed with Dr. Powell.  Plan for class A for craniotomy for clot evacuation.  Will obtain stat CBC and ordered 2 units of PRBC for the OR.  Discussed with the OR team and neuro ICU.        Discussed with Dr. Powell    Please call with any questions.    Sunny Daily PA-C  Neurosurgery

## 2019-04-22 NOTE — CONSULTS
[facility]  Nephrology  Consult Note    Patient Name: Holly Patel  MRN: [id number]  Admission Date: 4/22/2019  Hospital Length of Stay: 0 days  Attending Provider: Matthew Larson MD   Primary Care Physician: Stan Sosa MD  Principal Problem:Brain tumor    Inpatient consult to Nephrology  Consult performed by: Raheem Cifuentes NP  Consult ordered by: Raquel Batres MD  Reason for consult: ESRD        Subjective:     HPI: Ms. Holly Patel is a 27 yo AAF with Tertiary hyperparathyroidism s/p partial parathyroidectomy, HTN, s/p OLTx in 2012, and ESRD on iHD TTS, and known history of non-compliance with medication regimen who is now s/p surgical excision of L calvarial lesion s/p excision and cranioplasty 4/22/19.  The pt presented to [facility] on 03/12/2019 with a complaint of headaches, among other symptoms, and received a workup that included a CTH which showed a left temporal calvarium lesion with mass effect.  She continued to suffer from headaches and NSGY scheduled patient for excision and cranioplasy.  She tolerated surgery well and was transferred to the [facility] for further monitoring.  Nephrology has been consulted for ESRD management while in-patient.     She normally  dialyzes at [facility] on MWF under the care of Dr. Bass.  She dialyzes for 3.5 hours and reports an EDW ~ 50 kg.  She has an AVF to her LFA.  She no longer makes urine.  Her last HD treatment was on Saturday (4/20).          Past Medical History:   Diagnosis Date    Anemia in ESRD (end-stage renal disease) 10/12/2015    dialysis tues, thursday, sat; access left arm    Chronic rejection of liver transplant 3/22/2016    Depression     Encounter for blood transfusion     ESRD on hemodialysis 9/30/2015    History of recent hospitalization 05/2018    pneumonia    History of splenomegaly 4/12/2016    Immunosuppressed 8/5/2017    Iron deficiency anemia secondary to inadequate dietary iron intake  2017    She receives IV iron periodically at the Dialysis Center.   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 at [facility] OR 2ND FLR    BIOPSY-LIVER N/A 2017    Performed by [facility] OR 2ND FLR    BIOPSY-LIVER N/A 3/22/2016    Performed by [facility] OR 2ND FLR     SECTION      x 2    CONIZATION-CERVICAL-LEEP N/A 6/15/2018    Performed by [facility]am [facility]roquin MD at [facility] OR    [id number]; upper extremity Left 2015    Performed by Idalia Diaz MD at [facility] OR 2ND FLR    EMBOLIZATION, BLOOD VESSEL N/A 2018    Performed by Aren Ramos MD at [facility] CATH LAB    Exam Under Anesthesia N/A 2018    Performed by [facility] at [facility] OR 2ND FLR    Exam under anesthesia N/A 2018    Performed by [facility]am [facility]roquin MD at [facility] OR    Exam under anesthesia (ADD ON ) N/A 2018    Performed by NICKIE Alvarez MD at [facility] OR    Exam under anesthesia -cervical suturing  N/A 2018    Performed by Lei Sims III, MD at [facility] OR    FISTULOGRAM Left 2015    Performed by [facility]alia Diaz MD at [facility] CATH LAB    LIVER BIOPSY      LIVER TRANSPLANT  1992    PARATHYROIDECTOMY, Minimally Invasive Bilateral Exploration Bilateral 3/27/2019    Performed by Ashley Guallpa MD at [facility] OR 2ND FLR    SUTURE REPAIR,CERVIX  2018    Performed by [facility]am Marroquin MD at [facility] OR    TUBAL LIGATION         Review of  patient's allergies indicates:   Allergen Reactions    Chloral hydrate Hallucinations     Other reaction(s): Hallucinations  Other reaction(s): Hives    Hydrocodone Other (See Comments)     Mental status changes     Current Facility-Administered Medications   Medication Frequency    0.9%  NaCl infusion (for blood administration) Q24H PRN    0.9%  NaCl infusion (for blood administration) Q24H PRN    0.9%  NaCl infusion (for blood administration) Q24H PRN    0.9%  NaCl infusion (for blood administration) Q24H PRN    [START ON 4/23/2019] ceFAZolin injection 2 g Daily    cefTRIAXone (ROCEPHIN) 2 g in dextrose 5 % 50 mL IVPB Q12H    lacosamide tablet 50 mg BID    levETIRAcetam tablet 500 mg BID    mupirocin 2 % ointment 1 g BID    niCARdipine (CARDENE) 40 mg/200 mL infusion     niCARdipine 40 mg/200 mL infusion Continuous    ondansetron disintegrating tablet 4 mg Q6H PRN    oxyCODONE immediate release tablet 5 mg Q6H PRN    sodium chloride 0.9% flush 10 mL PRN    tacrolimus capsule 6 mg BID     Family History     Problem Relation (Age of Onset)    Cancer Sister    Heart attack Maternal Uncle    Hypertension Mother, Father        Tobacco Use    Smoking status: Never Smoker    Smokeless tobacco: Never Used   Substance and Sexual Activity    Alcohol use: No    Drug use: No    Sexual activity: Never     Partners: Male     Review of Systems   Unable to perform ROS: Acuity of condition     Objective:     Vital Signs (Most Recent):  Temp: 97.8 °F (36.6 °C) (04/22/19 1100)  Pulse: 100 (04/22/19 1400)  Resp: 20 (04/22/19 1400)  BP: (!) 146/70 (04/22/19 1400)  SpO2: 99 % (04/22/19 1400)  O2 Device (Oxygen Therapy): nasal cannula (04/22/19 1100) Vital Signs (24h Range):  Temp:  [97.8 °F (36.6 °C)-97.9 °F (36.6 °C)] 97.8 °F (36.6 °C)  Pulse:  [] 100  Resp:  [12-31] 20  SpO2:  [97 %-100 %] 99 %  BP: (137-210)/() 146/70     Weight: 54.8 kg (120 lb 13 oz) (04/22/19 1100)  Body mass index is 22.1  kg/m².  Body surface area is 1.55 meters squared.    No intake/output data recorded.    Physical Exam   Constitutional: She is oriented to person, place, and time. She appears well-developed. She appears lethargic. No distress.   HENT:   Right Ear: External ear normal.   Left Ear: External ear normal.   Drain in place near L temporal region   Eyes: Conjunctivae and EOM are normal. Right eye exhibits no discharge. Left eye exhibits no discharge.   Neck: Normal range of motion. Neck supple.   Cardiovascular: Regular rhythm. Tachycardia present. Exam reveals no gallop and no friction rub.   No murmur heard.  Pulmonary/Chest: Effort normal and breath sounds normal. No respiratory distress. She has no wheezes. She has no rales.   Abdominal: Soft. Bowel sounds are normal. She exhibits no distension. There is no tenderness.   Musculoskeletal: Normal range of motion. She exhibits no edema or deformity.   Neurological: She is oriented to person, place, and time. She appears lethargic.   Responds appropriately   Skin: Skin is warm and dry. She is not diaphoretic.   Psychiatric: She has a normal mood and affect. Her behavior is normal.       Significant Labs:  CBC:   Recent Labs   Lab 04/22/19  0629   WBC 3.61*   RBC 2.82*   HGB 7.5*   HCT 23.4*   PLT 56*   MCV 83   MCH 26.6*   MCHC 32.1     CMP:   Recent Labs   Lab 04/22/19  1219   *   CALCIUM 6.4*   ALBUMIN 3.2*   PROT 8.3      K 4.6   CO2 19*      BUN 46*   CREATININE 7.8*   ALKPHOS 722*   ALT 36   AST 46*   BILITOT 0.8           Assessment/Plan:     ESRD on hemodialysis  ESRD on iHD TTS  [facility]  3.5 hours  EDW ~ 50 kg  LFA AVF    Plan/Recommendations:  -No urgent need for RRT today  -plan for HD tomorrow.  -continue strict I/O's  -daily renal panel with phos added  -renal diet      Hypocalcemia  2/2 non-compliance with activated vit D and calcium supplementation s/p parathyroidectomy  -PTH level in AM  -phos levels daily  -corrected  calcium low @ 7.04  -check iCA.  If < 1.0, recommend replacement with 1 gm of calcium chloride IV  -will start on calcitriol 0.5 mcg po BID  -will start on Calcium Carbonate 2 g TID       Raheem Trujillo NP  Nephrology  [facility]

## 2019-04-22 NOTE — ASSESSMENT & PLAN NOTE
- ESRD w/ HD on MWF  - Consulted nephrology to continue HD schedule while inpatient; appreciate assistance

## 2019-04-23 NOTE — PROGRESS NOTES
Bedside HD treatment initiated via LFA fistula with buttonhole needles, primary nurse and family at bedside.

## 2019-04-23 NOTE — SUBJECTIVE & OBJECTIVE
Review of Systems   Unable to perform ROS: Intubated       Past Medical History:   Diagnosis Date    Anemia in ESRD (end-stage renal disease) 10/12/2015    dialysis es, thursday, sat; access left arm    Chronic rejection of liver transplant 3/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 ()    Moderate protein-calorie malnutrition 2017    MRSA bacteremia 2017    Pneumonia     Prophylactic immunotherapy 2014    Renovascular hypertension 10/2/2015    Secondary hyperparathyroidism 2017    Seizures     Sialadenitis 3/21/2018    Thrombocytopenia 2016       Past Surgical History:   Procedure Laterality Date    BIOPSY, LIVER, TRANSJUGULAR APPROACH N/A 2018    Performed by [facility] OR 2ND FLR    BIOPSY-LIVER N/A 2017    Performed by [facility] at [facility] OR 2ND FLR    BIOPSY-LIVER N/A 3/22/2016    Performed by [facility] OR 2ND FLR     SECTION      x 2    CONIZATION-CERVICAL-LEEP N/A 6/15/2018    Performed by Neelam Marroquin MD at [facility] [place] OR    SDGPEJWOXSYH-BMLMJCY-[id number]; upper extremity Left 2015    Performed by Idalia Diaz MD at [facility] OR 2ND FLR    CRANIOPLASTY  2019    Performed by Jose Luis Powell MD at [facility] OR 2ND FLR    CRANIOTOMY-- left crani for clot evac with microscrope Left 2019    Performed by Jose Luis Powell MD at [facility] OR 2ND FLR    EMBOLIZATION, BLOOD VESSEL N/A 2018    Performed by Aren Ramos MD at [facility] [place] CATH LAB    Exam Under Anesthesia N/A 2018    Performed by  Neelam Marroquin MD at [facility] OR 2ND FLR    Exam under anesthesia N/A 6/19/2018    Performed by Neelam Marroquin MD at [facility] OR    Exam under anesthesia (ADD ON ) N/A 7/9/2018    Performed by NICKIE Alvarez MD at [facility] OR    Exam under anesthesia -cervical suturing  N/A 7/26/2018    Performed by Lei Sims III, MD at [facility] OR    EXCISION, NEOPLASM, SKULL with Cranioplasty Left 4/22/2019    Performed by Jose Luis Powell MD at [facility] OR 2ND FLR    FISTULOGRAM Left 12/4/2015    Performed by Idalia Diaz MD at [facility] CATH LAB    LIVER BIOPSY      LIVER TRANSPLANT  09/1992    PARATHYROIDECTOMY, Minimally Invasive Bilateral Exploration Bilateral 3/27/2019    Performed by Ashley Guallpa MD at [facility] OR 2ND FLR    SUTURE REPAIR,CERVIX  6/19/2018    Performed by Neelam Marroquin MD at [facility] OR    TUBAL LIGATION  2010       Family history of liver disease: No    Review of patient's allergies indicates:   Allergen Reactions    Chloral hydrate Hallucinations     Other reaction(s): Hallucinations  Other reaction(s): Hives    Hydrocodone Other (See Comments)     Mental status changes       Tobacco Use    Smoking status: Never Smoker    Smokeless tobacco: Never Used   Substance and Sexual Activity    Alcohol use: No    Drug use: No    Sexual activity: Never     Partners: Male       Medications Prior to Admission   Medication Sig Dispense Refill Last Dose    calcitRIOL (ROCALTROL) 0.5 MCG Cap Take 2 capsules (1 mcg total) by mouth 2 (two) times daily. 60 capsule 1 4/21/2019 at Unknown time    calcium carbonate (OS-WOLFGANG) 500 mg calcium (1,250 mg) tablet Take 4 tablets (2,000 mg total) by mouth 3 (three) times daily with meals.  0 4/21/2019 at 2000    carvedilol (COREG) 25 MG tablet Take 1 tablet (25 mg total) by mouth 2 (two) times daily. 60 tablet 11 4/22/2019 at 0400    cloNIDine (CATAPRES) 0.1 MG tablet Take 1 tablet (0.1 mg total) by mouth 2 (two) times daily. 60 tablet 1 4/22/2019 at 0400     dicyclomine (BENTYL) 20 mg tablet Take 1 tablet (20 mg total) by mouth every 6 (six) hours. 30 tablet 0 4/22/2019 at 0400    famotidine (PEPCID) 40 MG tablet Take 1 tablet (40 mg total) by mouth every morning. 30 tablet 11 4/21/2019 at 2000    hydrALAZINE (APRESOLINE) 100 MG tablet Take 1 tablet (100 mg total) by mouth every 8 (eight) hours. 90 tablet 11 4/22/2019 at 0400    lacosamide (VIMPAT) 50 mg Tab Take 1 tablet (50 mg total) by mouth 2 (two) times daily. 60 tablet 11 4/22/2019 at 0400    levETIRAcetam (KEPPRA) 500 MG Tab Take 1 tablet (500 mg total) by mouth 2 (two) times daily. 60 tablet 11 4/22/2019 at 0400    tacrolimus (PROGRAF) 1 MG Cap Take 6 capsules (6 mg total) by mouth every 12 (twelve) hours. 360 capsule 11 4/22/2019 at Unknown time    verapamil (CALAN-SR) 240 MG CR tablet Take 1 tablet (240 mg total) by mouth every evening. 90 tablet 3 4/21/2019 at Unknown time    ergocalciferol (ERGOCALCIFEROL) 50,000 unit Cap Take 1 capsule (50,000 Units total) by mouth every 30 days. 3 capsule 1 4/15/2019    irbesartan (AVAPRO) 300 MG tablet Take 1 tablet (300 mg total) by mouth once daily. (Patient taking differently: Take 300 mg by mouth every morning. ) 30 tablet 3 3/27/2019 at 0400    triamcinolone acetonide 0.1% (KENALOG) 0.1 % ointment AAA on arms, legs, and neck bid x 1-2 wks then prn flares only 80 g 3 More than a month at Unknown time       Objective:     Vital Signs (Most Recent):  Temp: 99.2 °F (37.3 °C) (04/23/19 1500)  Pulse: 80 (04/23/19 1531)  Resp: 15 (04/23/19 1531)  BP: 126/65 (04/23/19 1500)  SpO2: 100 % (04/23/19 1531) Vital Signs (24h Range):  Temp:  [96.5 °F (35.8 °C)-99.2 °F (37.3 °C)] 99.2 °F (37.3 °C)  Pulse:  [] 80  Resp:  [7-44] 15  SpO2:  [97 %-100 %] 100 %  BP: ()/() 126/65  Arterial Line BP: (111-198)/(45-92) 146/73     Weight: 54.8 kg (120 lb 13 oz) (04/22/19 1100)  Body mass index is 22.1 kg/m².    Physical Exam   Constitutional: She is sedated and  intubated.   HENT:   Multiple surgical wounds, healing well   Eyes: Conjunctivae are normal. No scleral icterus.   Neck: Normal range of motion. Neck supple.   Cardiovascular: Normal rate and regular rhythm.   Pulmonary/Chest: She is intubated.   Abdominal: Soft. Bowel sounds are normal. She exhibits no distension and no mass. There is no tenderness. There is no rebound and no guarding.   Musculoskeletal: Normal range of motion.   Skin: Skin is warm and dry.       MELD-Na score: 21 at 4/23/2019  7:57 AM  MELD score: 21 at 4/23/2019  7:57 AM  Calculated from:  Serum Creatinine: On dialysis. Using 4 mg/dL.  Serum Sodium: 138 mmol/L (Rounded to 137 mmol/L) at 4/23/2019  2:28 AM  Total Bilirubin: 0.6 mg/dL (Rounded to 1 mg/dL) at 4/23/2019  2:28 AM  INR(ratio): 1.1 at 4/23/2019  7:57 AM  Age: 28 years    Significant Labs:  CBC:   Recent Labs   Lab 04/23/19  1315   WBC 8.28   RBC 3.18*   HGB 8.4*   HCT 26.0*   *     BMP:   Recent Labs   Lab 04/23/19  0228   *      K 5.4*      CO2 20*   BUN 56*   CREATININE 8.4*   CALCIUM 6.6*     CMP:   Recent Labs   Lab 04/23/19 0228   *   CALCIUM 6.6*   ALBUMIN 3.0*   PROT 7.6      K 5.4*   CO2 20*      BUN 56*   CREATININE 8.4*   ALKPHOS 602*   ALT 28   AST 27   BILITOT 0.6     Coagulation:   Recent Labs   Lab 04/23/19  0757   INR 1.1   APTT 26.9       Significant Imaging:  Labs: Reviewed

## 2019-04-23 NOTE — PT/OT/SLP PROGRESS
Occupational Therapy      Patient Name:  Holly Patel   MRN:  [id number]    Patient not seen today secondary to pt underwent emergent 2nd surgery for clot evacuation after original surgery for cranioplasty with excision of brain lesion. Will follow-up once new OT orders are placed post 2nd surgery.  Discontinued OT orders.    RENA Galindo  4/23/2019

## 2019-04-23 NOTE — TELEPHONE ENCOUNTER
----- Message from Christine Coello sent at 4/23/2019  3:42 PM CDT -----  Contact: minal  Patient Returning Call from [facility]    Who Left Message for Patient: tereso LANG  Communication Preference: [phone]  Additional Information:

## 2019-04-23 NOTE — ASSESSMENT & PLAN NOTE
- Patient taken urgently to the OR yesterday following worsening neuro exam w/ CT demonstrating L temporal increased edema, hematoma, and midline shift  - S/p urgent L clot evacuation  - Post-op CT demonstrating:  Small volume residual blood products w/ post-operative air within L temporal lobe/surgical site. Persistent vasogenic edema w/ continued mass effect of 0.5 cm w/ rightward shift  - Improvement in neuro exam  - Continue to monitor w/ q1 hour neurochecks

## 2019-04-23 NOTE — TELEPHONE ENCOUNTER
----- Message from Christine Coello sent at 4/23/2019 12:54 PM CDT -----  Contact: Yulissa Prince is calling to get status update on pt  Pt had brain tumor removed and is in ICU  Sister states surgery did not go well    pls contact Yulissa [phone]

## 2019-04-23 NOTE — NURSING
Pt's hemoglobin 6.8, which is a drop from a pre-surgery value of 9.4. Pt hemodynamically stable, notified ROXIE Moraes, will come to bedside.

## 2019-04-23 NOTE — NURSING
Pt currently maxed out on cardene at 15 mg/hr.  Notified NCC, will give one dose of fentanyl, orders to give PRN hydralazine if SBP does not decrease.

## 2019-04-23 NOTE — PROGRESS NOTES
[facility]  Neurocritical Care  Progress Note    Admit Date: 4/22/2019  Service Date: 04/23/2019  Length of Stay: 1    Subjective:     Chief Complaint: Brain tumor    History of Present Illness: 29 yo F w/ PMH significant for liver transplant in 1991, recent thyroidectomy on 3/27/2019, ESRD on HD (MWF), and epilepsy who presents to [facility] for higher level of care following planned surgical excision of L calvarial lesion s/p excision and cranioplasty 4/22/19. The pt presented to [facility] on 03/12/2019 with a complaint of headaches, among other symptoms, and received a workup that included a CT head which showed a left temporal calvarium lesion with mass effect. At that time she shared that she continued to have a headache, noting the pain was usually over the left temporal aspect of her head but migrated over to the right temporal aspect. She states the pain on the right is exacerbated with palpation of the area. She has no additional symptomatic complaints at this time. Currently stable following surgery. Denies acute pain, otherwise comfortable w/ non-focal neurological exam.         Hospital Course: 4/22: Admitted to [facility]. Initiated cardene gtt to maintain SBP <140. Consulted hepatology and nephrology. Acute worsening of neuro exam. STAT CT revealed enlarging hematoma w/ midline shift. Urgently taken down to OR for evacuation. Return from OR intubated.   4/23: Neuro exam improved from previous. Somnolent but rousable. Following commands. Moving all extremities spontaneously; RUE 4/5. Pending CRRT vs HD today.    Review of Systems   Unable to perform ROS: Intubated       Objective:     Vitals:  Temp: 98.9 °F (37.2 °C)  Pulse: 78  Rhythm: normal sinus rhythm  BP: 134/73  MAP (mmHg): 96  Resp: 16  SpO2: 100 %  Oxygen Concentration (%): 40  O2 Device (Oxygen Therapy): ventilator  Vent Mode: SIMV  Set Rate: 14 bmp  Vt Set: 350 mL  Pressure Support: 12 cmH20  PEEP/CPAP: 5 cmH20  Peak Airway Pressure: 23  cmH2O  Mean Airway Pressure: 9.8 cmH20  Plateau Pressure: 14 cmH20    Temp  Min: 96.5 °F (35.8 °C)  Max: 99 °F (37.2 °C)  Pulse  Min: 69  Max: 110  BP  Min: 93/47  Max: 191/101  MAP (mmHg)  Min: 68  Max: 130  Resp  Min: 7  Max: 44  SpO2  Min: 97 %  Max: 100 %  Oxygen Concentration (%)  Min: 40  Max: 60    04/22 0701 - 04/23 0700  In: 3326.6 [I.V.:1860.7]  Out: 125 [Drains:125]           Physical Exam   Constitutional: She appears well-developed. No distress.   Thin appearing female, no acute distress  Somnolent but easily rousable   HENT:   Head: Atraumatic.   Noted sutures to R frontal forehead  L surgical drain in place    Eyes: Pupils are equal, round, and reactive to light. Conjunctivae and EOM are normal. No scleral icterus.   Neck: Normal range of motion.   Cardiovascular: Normal rate, regular rhythm and normal heart sounds.   No murmur heard.  Pulmonary/Chest: Effort normal and breath sounds normal. No respiratory distress. She has no wheezes.   Abdominal: Soft. Bowel sounds are normal. There is no tenderness.   No grimace to palpation   Musculoskeletal: She exhibits no edema.   Neurological: She is alert.   [id number]  No gross CN deficits  Follows commands  RUE 4/5, LUE 5/5, BLE 5/5   No appreciable sensory deficits    Skin: Skin is warm and dry.   Nursing note and vitals reviewed.        Medications:  Continuous  nicardipine Last Rate: 5 mg/hr (04/23/19 1300)   propofol Last Rate: Stopped (04/23/19 0530)   Scheduled  sodium chloride 0.9%  Once   calcitriol 0.5 mcg BID   calcium carbonate 2,000 mg TID   carvedilol 25 mg BID   ceFAZolin (ANCEF) IVPB 2 g Daily   chlorhexidine 15 mL BID   dexamethasone 4 mg Q6H   famotidine 20 mg Daily   lacosamide 50 mg BID   levETIRAcetam 500 mg BID   mupirocin 1 g BID   senna-docusate 8.6-50 mg 2 tablet Daily   [START ON 4/24/2019] tacrolimus 6 mg Daily   tacrolimus 3 mg Daily   PRN  sodium chloride  Q24H PRN   sodium chloride  Q24H PRN   sodium chloride  Q24H PRN   sodium  chloride  Q24H PRN   sodium chloride  Q24H PRN   sodium chloride  Q24H PRN   sodium chloride  Q24H PRN   sodium chloride  Q24H PRN   sodium chloride  Q24H PRN   dextrose 50% 12.5 g PRN   glucagon (human recombinant) 1 mg PRN   hydrALAZINE 10 mg Q4H PRN   hydrALAZINE 25 mg Q8H PRN   insulin aspart U-100 1-10 Units Q6H PRN   labetalol 10 mg Q4H PRN   ondansetron 4 mg Q6H PRN   oxyCODONE 5 mg Q6H PRN   sodium chloride 0.9% 10 mL PRN     Today I personally reviewed pertinent medications, lines/drains/airways, imaging, laboratory results, notably:    Diet  Diet NPO          Assessment/Plan:     Neuro  Cytotoxic brain edema  - See above    Nontraumatic subcortical hemorrhage of left cerebral hemisphere  - Patient taken urgently to the OR yesterday following worsening neuro exam w/ CT demonstrating L temporal increased edema, hematoma, and midline shift  - S/p urgent L clot evacuation  - Post-op CT demonstrating:  Small volume residual blood products w/ post-operative air within L temporal lobe/surgical site. Persistent vasogenic edema w/ continued mass effect of 0.5 cm w/ rightward shift  - Improvement in neuro exam  - Continue to monitor w/ q1 hour neurochecks    Seizures  - Continue home medications: Keppra 500 mg BID, Vimpat 50 mg BID    Cardiac/Vascular  Renovascular hypertension  - On Verapamil, Irbasartan, Hydralazine, Coreg, Clonidine at home  - Will resume home Coreg 25 mg BID  - Continue cardene gtt w/ SBP goal <140  - Hydralazine PRN    Renal/  Hypocalcemia  - Corrected Ca 6.6  - Calcitriol 0.5 mcg BID  - Calcium carbonate 2000 mg TID    ESRD on hemodialysis  - ESRD w/ HD on MWF  - Consulted nephrology to continue HD schedule while inpatient; appreciate assistance    Immunology/Multi System  Immunosuppressed  - Stable  - See above    Hematology  Thrombocytopenia  - Platelets 56   - No active signs of bleeding  - Transfused 2 bags platelets  - Repeat CBC, CT head this pm  - Goal >100    Oncology  * Brain  tumor  27 yo F w/ L calvarial lesion s/p excision and cranioplasty on 4/22/19. Well tolerated w/o acute complications. [id number]. Non-focal neuro exam. Initial post-op CT w/ surgical drain in place, L temporal emphysema and hematoma; mild mass effect, no midline shift. However worsening exam, repeat CT w/ midline shift. S/p L temporal clot evacuation.     Plan:  - Patient admitted to [facility]  - q1h neurochecks  - Cardene gtt to maintain SBP <140   - Resume home coreg 25 mg BID  - Hydralazine, labetalol PRN  - Keppra 500mg BID  - Vimpat 50 mg BID  - Maintain Na >135  - HOB >35 degree at all times  - HV drain to full suction   - Cefazolin 2g daily while drain in place for ppx  - To notify NSG w/ any changes in neuro status    GI  Liver replaced by transplant  - S/p liver transplant 1991  - On tacrolimus 6 mg BID  - Consulted hepatology; appreciate assistance  - Continue home dose at this time  - Daily tacro level          The patient is being Prophylaxed for:  Venous Thromboembolism with: Mechanical or Chemical  Stress Ulcer with: H2B  Ventilator Pneumonia with: chlorhexidine oral care    Activity Orders          Diet NPO: NPO starting at 04/23 7482        Full Code    Raquel Batres MD  Neurocritical Care  [facility]

## 2019-04-23 NOTE — ASSESSMENT & PLAN NOTE
2/2 non-compliance with activated vit D and calcium supplementation s/p parathyroidectomy  -PTH elevated @ 407  -phos levels daily  -iCA low @ 0.67  -high calcium bath with dialysis today  -continue calcitriol 0.5 mcg po BID  -continue Calcium Carbonate 2 g TID

## 2019-04-23 NOTE — NURSING
NCC made aware of poor BP control with nicardipine, will start home meds, ok to titrate at a faster rate than 2.5 mg/hr, rate currently at 7.5 mg/hr.

## 2019-04-23 NOTE — BRIEF OP NOTE
[facility]  Brief Operative Note    SUMMARY     Surgery Date: 4/22/2019     Surgeon(s) and Role:     * Jose Luis Powell MD - Primary    Assisting Surgeon:  * Kimberly Roy MD-Resident     Pre-op Diagnosis:  Brain lesion [G93.9]    Post-op Diagnosis:  Post-Op Diagnosis Codes:     * Brain lesion [G93.9]    Procedure(s) (LRB):  CRANIOTOMY-- left crani for clot evac with microscrope (Left)    Anesthesia: General    Description of Procedure: L temporal craniotomy for clot evacuation     Description of the findings of the procedure: Please see full op note for more detalis    Estimated Blood Loss: <50         Specimens:   Specimen (12h ago, onward)    None

## 2019-04-23 NOTE — ASSESSMENT & PLAN NOTE
ESRD on iHD TTS  [facility]  Dr. Bass  3.5 hours  EDW ~ 50 kg  LFA AVF    Plan/Recommendations:  -2 hour HD today for gentle metabolic clearance/volume management  -Low flux dialyzer.  200 BFR/500 DFR  -UF 1L as tolerated, may need titration of cardene gtt during treatment for goal UF.  -continue strict I/O's  -daily renal panel with phos added

## 2019-04-23 NOTE — ASSESSMENT & PLAN NOTE
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 Hepatology consulted for IS co-management    Liver function tested overall within normal. Tacrolimus level today <1.5, however patient was unable to take last night dose of tacrolimus as she was in the OR    -Continue tacrolimus 6/3mg (decreased dose due to recent cranial surgery and concern for lowering seizure threshold.)  -Daily CBC, CMP, INR, tacrolimus level

## 2019-04-23 NOTE — PROGRESS NOTES
Pt transported to ct via Transport vent, procedure completed and pt has been returned without issue to room.

## 2019-04-23 NOTE — TELEPHONE ENCOUNTER
Returned call. Spoke with Yulissa who had multiple questions regarding kidney listing. Dicussed that I will ask the kidney coordinator to call her. Yulissa agreed with plan.

## 2019-04-23 NOTE — NURSING
Pt's SBP continues to be greater than 140 despite being given hydralazine and labetalol and maxed on cardene at 15 mg/hr. Notified NCC, will add home meds.

## 2019-04-23 NOTE — CONSULTS
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 The patient has history of non-compliance with medications in the past.     Liver function tested overall within normal    Review of Systems   Unable to perform ROS: Intubated       Past Medical History:   Diagnosis Date    Anemia in ESRD (end-stage renal disease) 10/12/2015    dialysis tues, thursday, sat; access left arm    Chronic rejection of liver transplant 3/22/2016    Depression     Encounter for blood transfusion     ESRD on hemodialysis 9/30/2015    History of recent hospitalization 05/2018    pneumonia    History of splenomegaly 4/12/2016    Immunosuppressed 8/5/2017    Iron deficiency anemia secondary to inadequate dietary iron intake 8/16/2017    She [facility]   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)    Moderate protein-calorie malnutrition 8/16/2017    MRSA bacteremia 8/6/2017    Pneumonia     Prophylactic immunotherapy 8/4/2014    Renovascular hypertension 10/2/2015    Secondary hyperparathyroidism 8/5/2017    Seizures     Sialadenitis  3/21/2018    Thrombocytopenia 2016       Past Surgical History:   Procedure Laterality Date    BIOPSY, LIVER, TRANSJUGULAR APPROACH N/A 2018    Performed by [facility] Provider at [facility] OR 2ND FLR    BIOPSY-LIVER N/A 2017    Performed by [facility] at [facility] OR 2ND FLR    BIOPSY-LIVER N/A 3/22/2016    Performed by [facility] Provider at [facility] OR 2ND FLR     SECTION      x 2    CONIZATION-CERVICAL-LEEP N/A 6/15/2018    Performed by Neelam Marroquin MD at [facility] OR    [id number]; upper extremity Left 2015    Performed by Idalia Diaz MD at [facility] OR 2ND FLR    CRANIOPLASTY  2019    Performed by Jose Luis Powell MD at [facility] OR 2ND FLR    CRANIOTOMY-- left crani for clot evac with microscrope Left 2019    Performed by Jose Luis Powell MD at [facility] OR 2ND FLR    EMBOLIZATION, BLOOD VESSEL N/A 2018    Performed by Aren Ramos MD at [facility] CATH LAB    Exam Under Anesthesia N/A 2018    Performed by Neelam Marroquin MD at [facility] OR 2ND FLR    Exam under anesthesia N/A 2018    Performed by Neelam Marroquin MD at [facility] OR    Exam under anesthesia (ADD ON ) N/A 2018    Performed by NICKIE Alvarez MD at [facility] OR    Exam under anesthesia -cervical suturing  N/A 2018    Performed by Lei Sims III, MD at [facility] OR    EXCISION, NEOPLASM, SKULL with Cranioplasty Left 2019    Performed by Jose Luis Powell MD at [facility] OR 2ND FLR    FISTULOGRAM Left 2015    Performed by Idalia Diaz MD at [facility] CATH LAB    LIVER BIOPSY      LIVER TRANSPLANT  1992    PARATHYROIDECTOMY, Minimally Invasive Bilateral Exploration Bilateral 3/27/2019    Performed by Ashley Guallpa MD at [facility] OR 2ND FLR    SUTURE REPAIR,CERVIX  2018    Performed by Neelam Marroquin MD at [facility] OR    TUBAL LIGATION         Family history of liver disease: No    Review of patient's allergies indicates:   Allergen  Reactions    Chloral hydrate Hallucinations     Other reaction(s): Hallucinations  Other reaction(s): Hives    Hydrocodone Other (See Comments)     Mental status changes       Tobacco Use    Smoking status: Never Smoker    Smokeless tobacco: Never Used   Substance and Sexual Activity    Alcohol use: No    Drug use: No    Sexual activity: Never     Partners: Male       Medications Prior to Admission   Medication Sig Dispense Refill Last Dose    calcitRIOL (ROCALTROL) 0.5 MCG Cap Take 2 capsules (1 mcg total) by mouth 2 (two) times daily. 60 capsule 1 4/21/2019 at Unknown time    calcium carbonate (OS-WOLFGANG) 500 mg calcium (1,250 mg) tablet Take 4 tablets (2,000 mg total) by mouth 3 (three) times daily with meals.  0 4/21/2019 at 2000    carvedilol (COREG) 25 MG tablet Take 1 tablet (25 mg total) by mouth 2 (two) times daily. 60 tablet 11 4/22/2019 at 0400    cloNIDine (CATAPRES) 0.1 MG tablet Take 1 tablet (0.1 mg total) by mouth 2 (two) times daily. 60 tablet 1 4/22/2019 at 0400    dicyclomine (BENTYL) 20 mg tablet Take 1 tablet (20 mg total) by mouth every 6 (six) hours. 30 tablet 0 4/22/2019 at 0400    famotidine (PEPCID) 40 MG tablet Take 1 tablet (40 mg total) by mouth every morning. 30 tablet 11 4/21/2019 at 2000    hydrALAZINE (APRESOLINE) 100 MG tablet Take 1 tablet (100 mg total) by mouth every 8 (eight) hours. 90 tablet 11 4/22/2019 at 0400    lacosamide (VIMPAT) 50 mg Tab Take 1 tablet (50 mg total) by mouth 2 (two) times daily. 60 tablet 11 4/22/2019 at 0400    levETIRAcetam (KEPPRA) 500 MG Tab Take 1 tablet (500 mg total) by mouth 2 (two) times daily. 60 tablet 11 4/22/2019 at 0400    tacrolimus (PROGRAF) 1 MG Cap Take 6 capsules (6 mg total) by mouth every 12 (twelve) hours. 360 capsule 11 4/22/2019 at Unknown time    verapamil (CALAN-SR) 240 MG CR tablet Take 1 tablet (240 mg total) by mouth every evening. 90 tablet 3 4/21/2019 at Unknown time    ergocalciferol (ERGOCALCIFEROL) 50,000  unit Cap Take 1 capsule (50,000 Units total) by mouth every 30 days. 3 capsule 1 4/15/2019    irbesartan (AVAPRO) 300 MG tablet Take 1 tablet (300 mg total) by mouth once daily. (Patient taking differently: Take 300 mg by mouth every morning. ) 30 tablet 3 3/27/2019 at 0400    triamcinolone acetonide 0.1% (KENALOG) 0.1 % ointment AAA on arms, legs, and neck bid x 1-2 wks then prn flares only 80 g 3 More than a month at Unknown time       Objective:     Vital Signs (Most Recent):  Temp: 99.2 °F (37.3 °C) (04/23/19 1500)  Pulse: 80 (04/23/19 1531)  Resp: 15 (04/23/19 1531)  BP: 126/65 (04/23/19 1500)  SpO2: 100 % (04/23/19 1531) Vital Signs (24h Range):  Temp:  [96.5 °F (35.8 °C)-99.2 °F (37.3 °C)] 99.2 °F (37.3 °C)  Pulse:  [] 80  Resp:  [7-44] 15  SpO2:  [97 %-100 %] 100 %  BP: ()/() 126/65  Arterial Line BP: (111-198)/(45-92) 146/73     Weight: 54.8 kg (120 lb 13 oz) (04/22/19 1100)  Body mass index is 22.1 kg/m².    Physical Exam   Constitutional: She is sedated and intubated.   HENT:   Multiple surgical wounds, healing well   Eyes: Conjunctivae are normal. No scleral icterus.   Neck: Normal range of motion. Neck supple.   Cardiovascular: Normal rate and regular rhythm.   Pulmonary/Chest: She is intubated.   Abdominal: Soft. Bowel sounds are normal. She exhibits no distension and no mass. There is no tenderness. There is no rebound and no guarding.   Musculoskeletal: Normal range of motion.   Skin: Skin is warm and dry.       MELD-Na score: 21 at 4/23/2019  7:57 AM  MELD score: 21 at 4/23/2019  7:57 AM  Calculated from:  Serum Creatinine: On dialysis. Using 4 mg/dL.  Serum Sodium: 138 mmol/L (Rounded to 137 mmol/L) at 4/23/2019  2:28 AM  Total Bilirubin: 0.6 mg/dL (Rounded to 1 mg/dL) at 4/23/2019  2:28 AM  INR(ratio): 1.1 at 4/23/2019  7:57 AM  Age: 28 years    Significant Labs:  CBC:   Recent Labs   Lab 04/23/19  1315   WBC 8.28   RBC 3.18*   HGB 8.4*   HCT 26.0*   *     BMP:   Recent  Labs   Lab 04/23/19 0228   *      K 5.4*      CO2 20*   BUN 56*   CREATININE 8.4*   CALCIUM 6.6*     CMP:   Recent Labs   Lab 04/23/19 0228   *   CALCIUM 6.6*   ALBUMIN 3.0*   PROT 7.6      K 5.4*   CO2 20*      BUN 56*   CREATININE 8.4*   ALKPHOS 602*   ALT 28   AST 27   BILITOT 0.6     Coagulation:   Recent Labs   Lab 04/23/19  0757   INR 1.1   APTT 26.9       Significant Imaging:  Labs: Reviewed    Assessment/Plan:     Liver replaced by transplant  Holly Patel is a 26 y/o female with past history of ESRD on HD and 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 Hepatology consulted for IS co-management    Liver function tested overall within normal. Tacrolimus level today <1.5, however patient was unable to take last night dose of tacrolimus as she was in the OR    -Continue tacrolimus 6/3mg (decreased dose due to recent cranial surgery and concern for lowering seizure threshold.)  -Daily CBC, CMP, INR, tacrolimus level            Thank you for your consult. I will follow-up with patient. Please contact us if you have any additional questions.    Kelly Servin MD  Hepatology  [facility]

## 2019-04-23 NOTE — ANESTHESIA POSTPROCEDURE EVALUATION
Anesthesia Post Evaluation    Patient: Holly Patel    Procedure(s) Performed: Procedure(s) (LRB):  CRANIOTOMY-- left crani for clot evac with microscrope (Left)    Final Anesthesia Type: general  Patient location during evaluation: ICU  Patient participation: No - Unable to Participate, Intubation  Level of consciousness: sedated  Post-procedure vital signs: reviewed and stable  Pain management: adequate  Airway patency: patent  PONV status at discharge: No PONV  Anesthetic complications: no      Cardiovascular status: hemodynamically stable  Respiratory status: intubated and ventilator  Hydration status: euvolemic  Follow-up not needed.          Vitals Value Taken Time   /81 4/22/2019  9:04 PM   Temp 36.1 °C (96.98 °F) 4/22/2019  8:04 PM   Pulse 110 4/22/2019  9:05 PM   Resp 34 4/22/2019  9:05 PM   SpO2 100 % 4/22/2019  9:05 PM   Vitals shown include unvalidated device data.      No case tracking events are documented in the log.      Pain/Bladimir Score: Pain Rating Prior to Med Admin: 3 (4/22/2019  9:50 AM)  Bladimir Score: 8 (4/22/2019 10:15 AM)

## 2019-04-23 NOTE — PROGRESS NOTES
Patient returned from OR via BVM,she has been placed on ventilator on doc settings. Rt will follow.

## 2019-04-23 NOTE — HPI
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 The patient has history of non-compliance with medications in the past.     Liver function tested overall within normal

## 2019-04-23 NOTE — PROGRESS NOTES
2hr HD treatment completed 1L of fluid removed pt tolerated well. Both needles of a LFA fistula removed and pressure held until hemostasis achieved dressing applied. Report given to primary nurse at bedside.

## 2019-04-23 NOTE — PLAN OF CARE
Problem: Adult Inpatient Plan of Care  Goal: Plan of Care Review  Outcome: Ongoing (interventions implemented as appropriate)  POC reviewed with pt and family at 1400.   Family verbalized understanding.   Questions and concerns addressed.   See flowsheets for full assessment and VS info.     Cardene for SBP < 140.  1 unit PRBCs.  Bath completed.  Plans for HD today.

## 2019-04-23 NOTE — TRANSFER OF CARE
"Anesthesia Transfer of Care Note    Patient: Holly Patel    Procedure(s) Performed: Procedure(s) (LRB):  CRANIOTOMY-- left crani for clot evac with microscrope (Left)    Patient location: ICU    Anesthesia Type: general    Transport from OR: Continuous ECG monitoring in transport. Continuous SpO2 monitoring in transport. Continuos invasive BP monitoring in transport. Upon arrival to PACU/ICU, patient attached to ventilator and auscultated to confirm bilateral breath sounds and adequate TV    Post pain: adequate analgesia    Post assessment: no apparent anesthetic complications and tolerated procedure well    Post vital signs: stable    Level of consciousness: sedated    Nausea/Vomiting: no nausea/vomiting    Complications: none    Transfer of care protocol was followed      Last vitals:   Visit Vitals  BP (!) 158/79   Pulse 108   Temp 35.9 °C (96.7 °F) (Axillary)   Resp (!) 22   Ht 5' 2" (1.575 m)   Wt 54.8 kg (120 lb 13 oz)   LMP  (LMP Unknown)   SpO2 100%   Breastfeeding? No   BMI 22.10 kg/m²     "

## 2019-04-23 NOTE — SUBJECTIVE & OBJECTIVE
Review of Systems   Unable to perform ROS: Intubated       Objective:     Vitals:  Temp: 98.9 °F (37.2 °C)  Pulse: 78  Rhythm: normal sinus rhythm  BP: 134/73  MAP (mmHg): 96  Resp: 16  SpO2: 100 %  Oxygen Concentration (%): 40  O2 Device (Oxygen Therapy): ventilator  Vent Mode: SIMV  Set Rate: 14 bmp  Vt Set: 350 mL  Pressure Support: 12 cmH20  PEEP/CPAP: 5 cmH20  Peak Airway Pressure: 23 cmH2O  Mean Airway Pressure: 9.8 cmH20  Plateau Pressure: 14 cmH20    Temp  Min: 96.5 °F (35.8 °C)  Max: 99 °F (37.2 °C)  Pulse  Min: 69  Max: 110  BP  Min: 93/47  Max: 191/101  MAP (mmHg)  Min: 68  Max: 130  Resp  Min: 7  Max: 44  SpO2  Min: 97 %  Max: 100 %  Oxygen Concentration (%)  Min: 40  Max: 60    04/22 0701 - 04/23 0700  In: 3326.6 [I.V.:1860.7]  Out: 125 [Drains:125]           Physical Exam   Constitutional: She appears well-developed. No distress.   Thin appearing female, no acute distress  Somnolent but easily rousable   HENT:   Head: Atraumatic.   Noted sutures to R frontal forehead  L surgical drain in place    Eyes: Pupils are equal, round, and reactive to light. Conjunctivae and EOM are normal. No scleral icterus.   Neck: Normal range of motion.   Cardiovascular: Normal rate, regular rhythm and normal heart sounds.   No murmur heard.  Pulmonary/Chest: Effort normal and breath sounds normal. No respiratory distress. She has no wheezes.   Abdominal: Soft. Bowel sounds are normal. There is no tenderness.   No grimace to palpation   Musculoskeletal: She exhibits no edema.   Neurological: She is alert.   [id number]  No gross CN deficits  Follows commands  RUE 4/5, LUE 5/5, BLE 5/5   No appreciable sensory deficits    Skin: Skin is warm and dry.   Nursing note and vitals reviewed.        Medications:  Continuous  nicardipine Last Rate: 5 mg/hr (04/23/19 1300)   propofol Last Rate: Stopped (04/23/19 0530)   Scheduled  sodium chloride 0.9%  Once   calcitriol 0.5 mcg BID   calcium carbonate 2,000 mg TID   carvedilol 25 mg BID    ceFAZolin (ANCEF) IVPB 2 g Daily   chlorhexidine 15 mL BID   dexamethasone 4 mg Q6H   famotidine 20 mg Daily   lacosamide 50 mg BID   levETIRAcetam 500 mg BID   mupirocin 1 g BID   senna-docusate 8.6-50 mg 2 tablet Daily   [START ON 4/24/2019] tacrolimus 6 mg Daily   tacrolimus 3 mg Daily   PRN  sodium chloride  Q24H PRN   sodium chloride  Q24H PRN   sodium chloride  Q24H PRN   sodium chloride  Q24H PRN   sodium chloride  Q24H PRN   sodium chloride  Q24H PRN   sodium chloride  Q24H PRN   sodium chloride  Q24H PRN   sodium chloride  Q24H PRN   dextrose 50% 12.5 g PRN   glucagon (human recombinant) 1 mg PRN   hydrALAZINE 10 mg Q4H PRN   hydrALAZINE 25 mg Q8H PRN   insulin aspart U-100 1-10 Units Q6H PRN   labetalol 10 mg Q4H PRN   ondansetron 4 mg Q6H PRN   oxyCODONE 5 mg Q6H PRN   sodium chloride 0.9% 10 mL PRN     Today I personally reviewed pertinent medications, lines/drains/airways, imaging, laboratory results, notably:    Diet  Diet NPO

## 2019-04-23 NOTE — PROGRESS NOTES
Pt arrived from OR w/ Dr. Harris and CRNA x2. Pt intubated on propofol gtt. Hemovac drain x1 to full suction. Per Dr. Harris please keep HOB at 30 degrees, SBP < 140, coag studies q 3 and STAT post sx CT scan. Will place additional orders in. Kellie Block NP at bedside to assess pt.

## 2019-04-23 NOTE — PROGRESS NOTES
[facility]  Nephrology  Progress Note    Patient Name: Holly Patel  MRN: [id number]  Admission Date: 4/22/2019  Hospital Length of Stay: 1 days  Attending Provider: Matthew Larson MD   Primary Care Physician: Stan Sosa MD  Principal Problem:Brain tumor    Subjective:     Interval History:   Taken back to OR yesterday afternoon for hematoma evacuation.  Intubated for airway protection, remains intubated this morning.  NSGY/NCC following, ok with RRT today.  Mild hyperkalemia on AM labs.  Remains on cardene gtt.    Review of patient's allergies indicates:   Allergen Reactions    Chloral hydrate Hallucinations     Other reaction(s): Hallucinations  Other reaction(s): Hives    Hydrocodone Other (See Comments)     Mental status changes     Current Facility-Administered Medications   Medication Frequency    0.9%  NaCl infusion (for blood administration) Q24H PRN    0.9%  NaCl infusion (for blood administration) Q24H PRN    0.9%  NaCl infusion (for blood administration) Q24H PRN    0.9%  NaCl infusion (for blood administration) Q24H PRN    0.9%  NaCl infusion (for blood administration) Q24H PRN    0.9%  NaCl infusion (for blood administration) Q24H PRN    0.9%  NaCl infusion (for blood administration) Q24H PRN    0.9%  NaCl infusion (for blood administration) Q24H PRN    0.9%  NaCl infusion (for blood administration) Q24H PRN    0.9%  NaCl infusion Once    calcitriol solution 0.5 mcg BID    calcium carbonate (OS-WOLFGANG) tablet 500 mg TID    carvedilol tablet 25 mg BID    ceFAZolin injection 2 g Daily    chlorhexidine 0.12 % solution 15 mL BID    dexamethasone injection 4 mg Q6H    dextrose 50% injection 12.5 g PRN    famotidine tablet 20 mg Daily    glucagon (human recombinant) injection 1 mg PRN    hydrALAZINE injection 10 mg Q4H PRN    hydrALAZINE tablet 25 mg Q8H PRN    insulin aspart U-100 pen 1-10 Units Q6H PRN    labetalol 20 mg/4 mL (5 mg/mL) IV syring Q4H  PRN    lacosamide tablet 50 mg BID    levETIRAcetam tablet 500 mg BID    mupirocin 2 % ointment 1 g BID    niCARdipine 40 mg/200 mL infusion Continuous    ondansetron disintegrating tablet 4 mg Q6H PRN    oxyCODONE immediate release tablet 5 mg Q6H PRN    propofol (DIPRIVAN) 10 mg/mL infusion Continuous    senna-docusate 8.6-50 mg per tablet 2 tablet Daily    sodium chloride 0.9% flush 10 mL PRN    tacrolimus (PROGRAF) 1 mg/mL oral syringe BID       Objective:     Vital Signs (Most Recent):  Temp: 98.9 °F (37.2 °C) (04/23/19 1100)  Pulse: 79 (04/23/19 1133)  Resp: (!) 21 (04/23/19 1133)  BP: 126/67 (04/23/19 1100)  SpO2: 100 % (04/23/19 1133)  O2 Device (Oxygen Therapy): ventilator (04/23/19 1133) Vital Signs (24h Range):  Temp:  [96.5 °F (35.8 °C)-99 °F (37.2 °C)] 98.9 °F (37.2 °C)  Pulse:  [] 79  Resp:  [7-44] 21  SpO2:  [97 %-100 %] 100 %  BP: ()/() 126/67  Arterial Line BP: (111-198)/(45-92) 143/72     Weight: 54.8 kg (120 lb 13 oz) (04/22/19 1100)  Body mass index is 22.1 kg/m².  Body surface area is 1.55 meters squared.    I/O last 3 completed shifts:  In: 3326.6 [I.V.:1860.7; Blood:1415.9; IV Piggyback:50]  Out: 125 [Drains:125]    Physical Exam   Constitutional: She appears well-developed. No distress. She is intubated.   HENT:   Right Ear: External ear normal.   Left Ear: External ear normal.   Drain in place near L temporal region   Eyes: Conjunctivae and EOM are normal. Right eye exhibits no discharge. Left eye exhibits no discharge.   Neck: Normal range of motion. Neck supple.   Cardiovascular: Normal rate and regular rhythm. Exam reveals no gallop and no friction rub.   No murmur heard.  Pulmonary/Chest: Effort normal and breath sounds normal. She is intubated. No respiratory distress. She has no wheezes. She has no rales.   Abdominal: Soft. Bowel sounds are normal. She exhibits no distension. There is no tenderness.   Musculoskeletal: Normal range of motion. She exhibits no  edema or deformity.   Neurological: She is alert.   Skin: Skin is warm and dry. She is not diaphoretic.   Psychiatric: She has a normal mood and affect. Her behavior is normal.       Significant Labs:  ABGs:   Recent Labs   Lab 04/23/19  0022   PH 7.340*   PCO2 39.1   HCO3 21.1*   POCSATURATED 100   BE -5     CBC:   Recent Labs   Lab 04/23/19  0517   WBC 5.06   RBC 2.58*   HGB 6.8*   HCT 21.4*   *   MCV 83   MCH 26.4*   MCHC 31.8*     CMP:   Recent Labs   Lab 04/23/19  0228   *   CALCIUM 6.6*   ALBUMIN 3.0*   PROT 7.6      K 5.4*   CO2 20*      BUN 56*   CREATININE 8.4*   ALKPHOS 602*   ALT 28   AST 27   BILITOT 0.6            Assessment/Plan:     ESRD on hemodialysis  ESRD on iHD TTS  [facility]  Dr. Bass  3.5 hours  EDW ~ 50 kg  LFA AVF    Plan/Recommendations:  -2 hour HD today for gentle metabolic clearance/volume management  -Low flux dialyzer.  200 BFR/500 DFR  -UF 1L as tolerated, may need titration of cardene gtt during treatment for goal UF.  -continue strict I/O's  -daily renal panel with phos added      Hypocalcemia  2/2 non-compliance with activated vit D and calcium supplementation s/p parathyroidectomy  -PTH elevated @ 407  -phos levels daily  -iCA low @ 0.67  -high calcium bath with dialysis today  -continue calcitriol 0.5 mcg po BID  -continue Calcium Carbonate 2 g TID         Raheem Trujillo NP  Nephrology  [facility]

## 2019-04-23 NOTE — PLAN OF CARE
Problem: Adult Inpatient Plan of Care  Goal: Plan of Care Review  POC reviewed with pt at 0530. Pts family verbalized understanding. Questions and concerns addressed. Systolic < 140, Cardene at 7.5. HOB @ 30 degrees. Plan for today is extubate. No acute events overnight. Pt progressing toward goals. Will continue to monitor. See flowsheets for full assessment and VS info

## 2019-04-23 NOTE — PT/OT/SLP PROGRESS
Physical Therapy      Patient Name:  Holly Patel   MRN:  [id number]      Patient not seen today secondary to pt underwent emergent 2nd surgery for clot evacuation after original surgery for cranioplasty with excision of brain lesion. Will follow-up once new PT orders are placed post 2nd surgery.  Discontinued PT orders.        Shama Gant, PT, DPT  4/23/2019

## 2019-04-24 NOTE — ASSESSMENT & PLAN NOTE
ESRD on iHD TTS  [facility]  Dr. Bass  3.5 hours  EDW ~ 50 kg  LFA AVF    Plan/Recommendations:  -hold further RRT today  -plan for HD tomorrow.  -continue strict I/O's  -daily renal panel with phos added     Alert and oriented to person, place and time

## 2019-04-24 NOTE — PROGRESS NOTES
[facility]  Nephrology  Progress Note    Patient Name: Holly Patel  MRN: [id number]  Admission Date: 4/22/2019  Hospital Length of Stay: 2 days  Attending Provider: Jaciel Roblero MD   Primary Care Physician: Stan Sosa MD  Principal Problem:Brain tumor    Subjective:     Interval History:   2 hour HD treatment completed yesterday, tolerated well w/o adverse occurrences.  On SBT this morning, possible extubation today.  Electrolytes stable/volume status acceptable.  Net negative ~ 200 ml/24h.    Review of patient's allergies indicates:   Allergen Reactions    Chloral hydrate Hallucinations     Other reaction(s): Hallucinations  Other reaction(s): Hives    Hydrocodone Other (See Comments)     Mental status changes     Current Facility-Administered Medications   Medication Frequency    0.9%  NaCl infusion Once    calcitriol solution 0.5 mcg BID    calcium carbonate 500 mg/5 mL (1,250 mg/5 mL) suspension 2,000 mg TID    carvedilol tablet 25 mg BID    ceFAZolin injection 2 g Daily    chlorhexidine 0.12 % solution 15 mL BID    dexamethasone injection 4 mg Q6H    dextrose 50% injection 12.5 g PRN    famotidine tablet 20 mg Daily    glucagon (human recombinant) injection 1 mg PRN    hydrALAZINE injection 10 mg Q4H PRN    hydrALAZINE tablet 25 mg Q8H PRN    insulin aspart U-100 pen 1-10 Units Q6H PRN    labetalol 20 mg/4 mL (5 mg/mL) IV syring Q4H PRN    lacosamide tablet 50 mg BID    levETIRAcetam tablet 500 mg BID    mupirocin 2 % ointment 1 g BID    niCARdipine 40 mg/200 mL infusion Continuous    ondansetron disintegrating tablet 4 mg Q6H PRN    oxyCODONE immediate release tablet 5 mg Q6H PRN    propofol (DIPRIVAN) 10 mg/mL infusion Continuous    senna-docusate 8.6-50 mg per tablet 2 tablet Daily    sodium chloride 0.9% flush 10 mL PRN    tacrolimus (PROGRAF) 1 mg/mL oral syringe Daily    [START ON 4/25/2019] tacrolimus (PROGRAF) 1 mg/mL oral syringe Daily        Objective:     Vital Signs (Most Recent):  Temp: 98.4 °F (36.9 °C) (04/24/19 1105)  Pulse: 71 (04/24/19 1127)  Resp: 13 (04/24/19 1127)  BP: 132/68 (04/24/19 1105)  SpO2: 100 % (04/24/19 1127)  O2 Device (Oxygen Therapy): ventilator (04/24/19 1127) Vital Signs (24h Range):  Temp:  [98.1 °F (36.7 °C)-99.2 °F (37.3 °C)] 98.4 °F (36.9 °C)  Pulse:  [69-95] 71  Resp:  [12-22] 13  SpO2:  [100 %] 100 %  BP: ()/(51-79) 132/68  Arterial Line BP: (108-152)/(50-76) 137/71     Weight: 54.8 kg (120 lb 13 oz) (04/22/19 1100)  Body mass index is 22.1 kg/m².  Body surface area is 1.55 meters squared.    I/O last 3 completed shifts:  In: 2998.1 [I.V.:2044.1; Blood:344; Other:500; NG/GT:60; IV Piggyback:50]  Out: 1625 [Drains:125; Other:1500]    Physical Exam   Constitutional: She appears well-developed. No distress. She is intubated.   HENT:   Right Ear: External ear normal.   Left Ear: External ear normal.   Drain in place near L temporal region   Eyes: Conjunctivae and EOM are normal. Right eye exhibits no discharge. Left eye exhibits no discharge.   Neck: Normal range of motion. Neck supple.   Cardiovascular: Normal rate and regular rhythm. Exam reveals no gallop and no friction rub.   No murmur heard.  Pulmonary/Chest: Effort normal and breath sounds normal. She is intubated. No respiratory distress. She has no wheezes. She has no rales.   Abdominal: Soft. Bowel sounds are normal. She exhibits no distension. There is no tenderness.   Musculoskeletal: Normal range of motion. She exhibits no edema or deformity.   Neurological: She is alert.   Skin: Skin is warm and dry. She is not diaphoretic.   Psychiatric: She has a normal mood and affect. Her behavior is normal.       Significant Labs:  CBC:   Recent Labs   Lab 04/24/19  0847   WBC 8.49   RBC 3.34*   HGB 9.2*   HCT 27.9*      MCV 84   MCH 27.5   MCHC 33.0     CMP:   Recent Labs   Lab 04/24/19  0208   *   CALCIUM 7.0*   ALBUMIN 2.9*   PROT 7.5       K 4.7   CO2 21*      BUN 53*   CREATININE 7.5*   ALKPHOS 562*   ALT 25   AST 21   BILITOT 0.6          Assessment/Plan:     ESRD on hemodialysis  ESRD on iHD TTS  [facility]  Dr. Bass  3.5 hours  EDW ~ 50 kg  LFA AVF    Plan/Recommendations:  -hold further RRT today  -plan for HD tomorrow.  -continue strict I/O's  -daily renal panel with phos added      Hypocalcemia  2/2 non-compliance with activated vit D and calcium supplementation s/p parathyroidectomy  -PTH elevated @ 407  -phos levels daily  -continue calcitriol 0.5 mcg po BID  -continue Calcium Carbonate 2 g TID   -recommend 1gm calcium chloride IVPB x 1.        Raheem Trujillo NP  Nephrology  [facility]

## 2019-04-24 NOTE — TELEPHONE ENCOUNTER
"I received message from liver coordinator that Holly's sister wanted a call in relation to pts kidney listing. I called and spoke with Yulissa who wanted to know where pt was in process for kidney listing. "Is she on the list, is she gonna get a kidney?" I explained that most recently her listing was dependent on her PTH of >4,000 being uder control to <1,000. I educated her on the normal levels and how PTH is related to kideny failure and some of the issues that a high PTH can cause. I explained that once her parathyroid gland was removed she could be presented to transplant committee however she came to the ER with extreme headache which prompted head CT which then revealed brain mass. I further explained that she would not be a candidate until that brain mass was further clarified. I explained that my understanding was the neurosurgeon decided the lesion should be taken out to get that clarification. Yulissa states "I didn't want her to have the surgery but she wanted to get on the transplant list." I explained that at this oint Holly is not on the list and cannot be on the list until she has recovered from having two head surgeries and we see how she recovers from this. Yulissa said on several occasions that she understood what I was saying. She stated that she is the oldest sister and has been involved with Holly's care since Holly was 1 year old. I validated how hard this must be and did supportive listening. I provided Yulissa with my contact info and advised that I would be monitoring Holly's progress to move forward with Kidney transplant workup as feasible.  "

## 2019-04-24 NOTE — SUBJECTIVE & OBJECTIVE
Interval History:   2 hour HD treatment completed yesterday, tolerated well w/o adverse occurrences.  On SBT this morning, possible extubation today.  Electrolytes stable/volume status acceptable.  Net negative ~ 200 ml/24h.    Review of patient's allergies indicates:   Allergen Reactions    Chloral hydrate Hallucinations     Other reaction(s): Hallucinations  Other reaction(s): Hives    Hydrocodone Other (See Comments)     Mental status changes     Current Facility-Administered Medications   Medication Frequency    0.9%  NaCl infusion Once    calcitriol solution 0.5 mcg BID    calcium carbonate 500 mg/5 mL (1,250 mg/5 mL) suspension 2,000 mg TID    carvedilol tablet 25 mg BID    ceFAZolin injection 2 g Daily    chlorhexidine 0.12 % solution 15 mL BID    dexamethasone injection 4 mg Q6H    dextrose 50% injection 12.5 g PRN    famotidine tablet 20 mg Daily    glucagon (human recombinant) injection 1 mg PRN    hydrALAZINE injection 10 mg Q4H PRN    hydrALAZINE tablet 25 mg Q8H PRN    insulin aspart U-100 pen 1-10 Units Q6H PRN    labetalol 20 mg/4 mL (5 mg/mL) IV syring Q4H PRN    lacosamide tablet 50 mg BID    levETIRAcetam tablet 500 mg BID    mupirocin 2 % ointment 1 g BID    niCARdipine 40 mg/200 mL infusion Continuous    ondansetron disintegrating tablet 4 mg Q6H PRN    oxyCODONE immediate release tablet 5 mg Q6H PRN    propofol (DIPRIVAN) 10 mg/mL infusion Continuous    senna-docusate 8.6-50 mg per tablet 2 tablet Daily    sodium chloride 0.9% flush 10 mL PRN    tacrolimus (PROGRAF) 1 mg/mL oral syringe Daily    [START ON 4/25/2019] tacrolimus (PROGRAF) 1 mg/mL oral syringe Daily       Objective:     Vital Signs (Most Recent):  Temp: 98.4 °F (36.9 °C) (04/24/19 1105)  Pulse: 71 (04/24/19 1127)  Resp: 13 (04/24/19 1127)  BP: 132/68 (04/24/19 1105)  SpO2: 100 % (04/24/19 1127)  O2 Device (Oxygen Therapy): ventilator (04/24/19 1127) Vital Signs (24h Range):  Temp:  [98.1 °F (36.7 °C)-99.2  °F (37.3 °C)] 98.4 °F (36.9 °C)  Pulse:  [69-95] 71  Resp:  [12-22] 13  SpO2:  [100 %] 100 %  BP: ()/(51-79) 132/68  Arterial Line BP: (108-152)/(50-76) 137/71     Weight: 54.8 kg (120 lb 13 oz) (04/22/19 1100)  Body mass index is 22.1 kg/m².  Body surface area is 1.55 meters squared.    I/O last 3 completed shifts:  In: 2998.1 [I.V.:2044.1; Blood:344; Other:500; NG/GT:60; IV Piggyback:50]  Out: 1625 [Drains:125; Other:1500]    Physical Exam   Constitutional: She appears well-developed. No distress. She is intubated.   HENT:   Right Ear: External ear normal.   Left Ear: External ear normal.   Drain in place near L temporal region   Eyes: Conjunctivae and EOM are normal. Right eye exhibits no discharge. Left eye exhibits no discharge.   Neck: Normal range of motion. Neck supple.   Cardiovascular: Normal rate and regular rhythm. Exam reveals no gallop and no friction rub.   No murmur heard.  Pulmonary/Chest: Effort normal and breath sounds normal. She is intubated. No respiratory distress. She has no wheezes. She has no rales.   Abdominal: Soft. Bowel sounds are normal. She exhibits no distension. There is no tenderness.   Musculoskeletal: Normal range of motion. She exhibits no edema or deformity.   Neurological: She is alert.   Skin: Skin is warm and dry. She is not diaphoretic.   Psychiatric: She has a normal mood and affect. Her behavior is normal.       Significant Labs:  CBC:   Recent Labs   Lab 04/24/19  0847   WBC 8.49   RBC 3.34*   HGB 9.2*   HCT 27.9*      MCV 84   MCH 27.5   MCHC 33.0     CMP:   Recent Labs   Lab 04/24/19  0208   *   CALCIUM 7.0*   ALBUMIN 2.9*   PROT 7.5      K 4.7   CO2 21*      BUN 53*   CREATININE 7.5*   ALKPHOS 562*   ALT 25   AST 21   BILITOT 0.6

## 2019-04-24 NOTE — ASSESSMENT & PLAN NOTE
2/2 non-compliance with activated vit D and calcium supplementation s/p parathyroidectomy  -PTH elevated @ 407  -phos levels daily  -continue calcitriol 0.5 mcg po BID  -continue Calcium Carbonate 2 g TID   -recommend 1gm calcium chloride IVPB x 1.

## 2019-04-24 NOTE — TELEPHONE ENCOUNTER
----- Message from Tereso Francis RN sent at 4/23/2019  4:45 PM CDT -----  Contact: minal  Please call Minal. She has multiple questions regarding kidney listing.    Tereso    ----- Message -----  From: Christine Coello  Sent: 4/23/2019   3:42 PM  To: [facility] Post-Liver Transplant Clinical    Patient Returning Call from [facility]    Who Left Message for Patient: tereso LANG  Communication Preference: [phone]  Additional Information:

## 2019-04-24 NOTE — PROGRESS NOTES
[facility]  Neurocritical Care  Progress Note    Admit Date: 4/22/2019  Service Date: 04/24/2019  Length of Stay: 2    Subjective:     Chief Complaint: Brain tumor    History of Present Illness: 27 yo F w/ PMH significant for liver transplant in 1991, recent thyroidectomy on 3/27/2019, ESRD on HD (MWF), and epilepsy who presents to [facility] for higher level of care following planned surgical excision of L calvarial lesion s/p excision and cranioplasty 4/22/19. The pt presented to [facility] on 03/12/2019 with a complaint of headaches, among other symptoms, and received a workup that included a CT head which showed a left temporal calvarium lesion with mass effect. At that time she shared that she continued to have a headache, noting the pain was usually over the left temporal aspect of her head but migrated over to the right temporal aspect. She states the pain on the right is exacerbated with palpation of the area. She has no additional symptomatic complaints at this time. Currently stable following surgery. Denies acute pain, otherwise comfortable w/ non-focal neurological exam.         Hospital Course: 4/22: Admitted to [facility]. Initiated cardene gtt to maintain SBP <140. Consulted hepatology and nephrology. Acute worsening of neuro exam. STAT CT revealed enlarging hematoma w/ midline shift. Urgently taken down to OR for evacuation. Return from OR intubated.   4/23: Neuro exam improved from previous. Somnolent but rousable. Following commands. Moving all extremities spontaneously; RUE 4/5. Pending CRRT vs HD today.  04/24/2019: no acute adverse events overnight    Review of Systems   Unable to perform ROS: Intubated       Objective:     Vitals:  Temp: 98.4 °F (36.9 °C)  Pulse: 71  Rhythm: normal sinus rhythm  BP: 132/68  MAP (mmHg): 92  Resp: 13  SpO2: 100 %  Oxygen Concentration (%): 40  O2 Device (Oxygen Therapy): ventilator  Vent Mode: Spont  Set Rate: 0 bmp  Vt Set: 350 mL  Pressure Support: 10  cmH20  PEEP/CPAP: 5 cmH20  Peak Airway Pressure: 15 cmH2O  Mean Airway Pressure: 7.9 cmH20  Plateau Pressure: 13 cmH20    Temp  Min: 98.1 °F (36.7 °C)  Max: 99.2 °F (37.3 °C)  Pulse  Min: 69  Max: 95  BP  Min: 98/55  Max: 158/79  MAP (mmHg)  Min: 71  Max: 110  Resp  Min: 12  Max: 22  SpO2  Min: 100 %  Max: 100 %  Oxygen Concentration (%)  Min: 40  Max: 40    04/23 0701 - 04/24 0700  In: 1293.1 [I.V.:733.1]  Out: 1500    Unmeasured Output  Stool Occurrence: 0       Physical Exam   Constitutional: She appears well-developed. No distress.   Thin appearing female, no acute distress  Somnolent but easily rousable   HENT:   Head: Atraumatic.   Noted sutures to R frontal forehead  L surgical drain in place    Eyes: Pupils are equal, round, and reactive to light. Conjunctivae and EOM are normal. No scleral icterus.   Neck: Normal range of motion.   Cardiovascular: Normal rate, regular rhythm and normal heart sounds.   No murmur heard.  Pulmonary/Chest: Effort normal and breath sounds normal. No respiratory distress. She has no wheezes.   Abdominal: Soft. Bowel sounds are normal. There is no tenderness.   No grimace to palpation   Musculoskeletal: She exhibits no edema.   Neurological: She is alert.   E4VTM6  No gross CN deficits  Follows commands  RUE 4/5, LUE 5/5, BLE 5/5   No appreciable sensory deficits    Skin: Skin is warm and dry.   Nursing note and vitals reviewed.        Medications:  Continuous    nicardipine Last Rate: Stopped (04/24/19 0920)   propofol Last Rate: Stopped (04/23/19 0530)   Scheduled    sodium chloride 0.9%  Once   calcitriol 0.5 mcg BID   calcium carbonate 2,000 mg TID   carvedilol 25 mg BID   ceFAZolin (ANCEF) IVPB 2 g Daily   chlorhexidine 15 mL BID   dexamethasone 4 mg Q6H   famotidine 20 mg Daily   lacosamide 50 mg BID   levETIRAcetam 500 mg BID   mupirocin 1 g BID   senna-docusate 8.6-50 mg 2 tablet Daily   tacrolimus 3 mg Daily   [START ON 4/25/2019] tacrolimus 3 mg Daily   PRN    dextrose  50% 12.5 g PRN   glucagon (human recombinant) 1 mg PRN   hydrALAZINE 10 mg Q4H PRN   hydrALAZINE 25 mg Q8H PRN   insulin aspart U-100 1-10 Units Q6H PRN   labetalol 10 mg Q4H PRN   ondansetron 4 mg Q6H PRN   oxyCODONE 5 mg Q6H PRN   sodium chloride 0.9% 10 mL PRN     Today I personally reviewed pertinent medications, lines/drains/airways, imaging, laboratory results, notably:    Diet  Diet NPO          Assessment/Plan:     Neuro  Cytotoxic brain edema  - See ICH    Nontraumatic subcortical hemorrhage of left cerebral hemisphere  - Patient taken urgently to the OR following worsening neuro exam w/ CT demonstrating L temporal increased edema, hematoma, and midline shift  - S/p urgent L clot evacuation  - Post-op CT demonstrating:  Small volume residual blood products w/ post-operative air within L temporal lobe/surgical site. Persistent vasogenic edema w/ continued mass effect of 0.5 cm w/ rightward shift  - Improvement in neuro exam  - Continue to monitor w/ q1 hour neurochecks  - NSGY following    Seizures  - Continue home medications: Keppra 500 mg BID, Vimpat 50 mg BID    Cardiac/Vascular  Renovascular hypertension  - On Verapamil, Irbasartan, Hydralazine, Coreg, Clonidine at home  - resume home Coreg 25 mg BID  - DisContinue cardene gtt  - Hydralazine PRN    Renal/  ESRD on hemodialysis  - ESRD w/ HD on MWF  - Consulted nephrology to continue HD schedule while inpatient; appreciate assistance    Immunology/Multi System  Immunosuppressed  - Stable  - See above    Hematology  Thrombocytopenia  - Platelets 155  - No active signs of bleeding      Oncology  * Brain tumor  27 yo F w/ L calvarial lesion s/p excision and cranioplasty on 4/22/19. Well tolerated w/o acute complications. E4V5M6. Non-focal neuro exam. Initial post-op CT w/ surgical drain in place, L temporal emphysema and hematoma; mild mass effect, no midline shift. However worsening exam, repeat CT w/ midline shift. S/p L temporal clot evacuation.      Plan:  - Patient admitted to [facility]  - q1h neurochecks  - Cardene gtt to maintain SBP <140   - Resume home coreg 25 mg BID  - Hydralazine, labetalol PRN  - Keppra 500mg BID  - Vimpat 50 mg BID  - Maintain Na >135  - HOB >35 degree at all times  - HV drain to full suction   - Cefazolin 2g daily while drain in place for ppx  - To notify NSG w/ any changes in neuro status    GI  Liver replaced by transplant  - S/p liver transplant 1991  - On tacrolimus 6 mg BID  - Consulted hepatology; appreciate assistance  - Continue home dose at this time  - Daily tacro level          The patient is being Prophylaxed for:  Venous Thromboembolism with: Mechanical  Stress Ulcer with: H2B  Ventilator Pneumonia with: chlorhexidine oral care    Activity Orders          Diet NPO: NPO starting at 04/23 7882        Full Code    See Bernardo MD  Neurocritical Care  [facility]

## 2019-04-24 NOTE — OP NOTE
DATE OF PROCEDURE:  04/22/2019.    SURGEON:  Jose Luis Powell M.D., Ph.D.    ASSISTANT:  Kimberly Roy M.D. (RES) (the assistant is a [facility]   Neuro[facility] resident).    PREOPERATIVE DIAGNOSES:  1.  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  2.  End-stage renal disease, on dialysis.  3.  Anemia of end-stage renal disease.  4.  History of splenomegaly.  5.  Secondary hyperparathyroidism.  6.  Seizures.  7.  Thrombocytopenia.    POSTOPERATIVE DIAGNOSES:  1.  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  2.  End-stage renal disease, on dialysis.  3.  Anemia of end-stage renal disease.  4.  History of splenomegaly.  5.  Secondary hyperparathyroidism.  6.  Seizures.  7.  Thrombocytopenia.    PROCEDURES:  1.  Left parietal craniectomy for resection of tumor.  2.  Stealth navigation.  3.  Microsurgical technique.    INDICATIONS IN DETAIL:  Ms. Holly Patel is a pleasant 28-year-old woman who   has end-stage renal disease.  The patient has had a transplant in the past and   is on immunotherapy for this.  The patient is currently seeking to have another   transplant.  However, a recent imaging of her head showed lytic lesions of the   skull.  These were concerning.  The patient is being seen by me for this.  The   patient comes in today to have removal of the largest lesion, which was   approximately 2 cm in diameter for diagnosis.    PROCEDURE IN DETAIL:  The patient was seen in the pretreatment area and the   risks, benefits and alternatives were discussed.  The patient wished to proceed.    At that time, the patient was discovered to have platelet level of 56,000.    Therefore, two packs of platelets were ordered for the patient in the Operating   Room.  The patient was brought to the Operating Room and general anesthetic was   administered.  All proper lines were placed.  The patient was placed in the   supine position with a bump  underneath her left shoulder and head turned towards   the right to allow access to the left parietal region.  The Stealth navigation   system was brought to the field and an accurate registration was achieved using   the tracer function and a CT, which had been obtained previously.  The area of   tumor on the patient's scalp was marked.  The hair was clipped in this region   and a reverse horseshoe-shaped line was drawn around the patient's lesion.  The   patient's head was cleaned, prepped and draped in the usual fashion.  A   lidocaine-bupivacaine mix was infiltrated under the skin.  As the most inferior   aspect of this was abutting the occipital lobe and the sinus was nearby, we had   planned on doing a large bur hole to avoid the sinus inferiorly.  Therefore,   incision was made and a flap was turned down towards the sinus.  The soft   tissues were mobilized and the skull could be seen.  A large bur hole was then   made in the region of the sinus.  Through this bur hole, the edge of the sinus   could be seen and this was expanded to allow us to avoid the transverse sinus.    Then, using a high-speed [employer] drill with a craniotome attachment, a   craniotomy flap encompassing the entire lesion was turned.  This was dissected   off and the tumor could be seen.  This was a lesion that involved the inner   cortex of the skull as well as the middle portion of the skull.  The tumor was   bloody in nature.  This was dissected away from the dura.  An incidental dural   rent was made and dissection of the tumor was very adherent.  This was closed   primarily using a 4-0 Nurolon suture.  The wound was irrigated copiously.  All   bleeding points were coagulated.  A titanium plate of approximately 2.5 cm was   obtained and this was placed over the region.  Vancomycin powder was placed.    The wound was then closed in layers with staples in the skin.  A clean dressing   was placed.  The patient was awakened by the  Anesthesia staff.  At the point,   the patient was following commands.  The patient was extubated.    The patient was transferred to the Postanesthesia Care Unit in good condition.    The plan was to keep this patient on the craniotomy pathway if the CT was fine   and if the patient's blood pressure, which had been elevated during surgery,   could be controlled.  EBL was approximately 50 mL.    There were no intraprocedural complications.    All counts were correct at the end of surgery.    Dr. Jose Luis Powell was present during the entire procedure.      TERRENCE  dd: 04/24/2019 07:17:56 (CDT)  td: 04/24/2019 09:07:23 (CDT)  Doc ID   #[id number]  Job ID #[id number]    CC:

## 2019-04-24 NOTE — ASSESSMENT & PLAN NOTE
29 yo F w/ L calvarial lesion s/p excision and cranioplasty on 4/22/19. Well tolerated w/o acute complications. E4V5M6. Non-focal neuro exam. Initial post-op CT w/ surgical drain in place, L temporal emphysema and hematoma; mild mass effect, no midline shift. However worsening exam, repeat CT w/ midline shift. S/p L temporal clot evacuation.     Plan:  - Patient admitted to [facility]  - q1h neurochecks  - Cardene gtt to maintain SBP <140   - Resume home coreg 25 mg BID  - Hydralazine, labetalol PRN  - Keppra 500mg BID  - Vimpat 50 mg BID  - Maintain Na >135  - HOB >35 degree at all times  - HV drain to full suction   - Cefazolin 2g daily while drain in place for ppx  - To notify NSG w/ any changes in neuro status

## 2019-04-24 NOTE — OP NOTE
DATE OF PROCEDURE:  04/22/2019.    SURGEON:  Jose Luis Powell M.D., Ph.D.    ASSISTANT:  Kimberly Roy M.D. (The assistant is a [facility] resident).    PREOPERATIVE DIAGNOSES:  1.  Postoperative intracranial hemorrhage.  2.  History of craniectomy for tumor.  3.  Anemia.  4.  Thrombocytopenia.  5.  End-stage renal disease.    POSTOPERATIVE DIAGNOSES:  1.  Postoperative intracranial hemorrhage.  2.  History of craniectomy for tumor.  3.  Anemia.  4.  Thrombocytopenia.  5.  End-stage renal disease.    PROCEDURES:  Minimally invasive removal of intracranial clot with associated   cranioplasty.    INDICATIONS IN DETAIL:  Ms. Holly Patel is a 28-year-old woman who has had a   surgery this morning.  The patient was thrombocytopenic and received platelets   for this problem.  Unfortunately, the patient was extremely hypertensive and had   systolic blood pressures into the 270s after surgery, which was not controlled   in the Postanesthesia Care region.  The patient initially had a small area of   contusion underneath her craniectomy, which was likely secondary to those   factors.  The patient was placed in the ICU and taken off the craniotomy   pathway.  She developed an enlarging hematoma in the setting and began having   right arm weakness.  She was emergently brought back for evacuation of hematoma.    PROCEDURE IN DETAIL:  The patient was brought to the Operating Room and a   general anesthetic was administered.  All proper lines were placed.  The patient   has been intubated previously.  A bump was placed underneath the left shoulder   and the head turned towards the right to allow access to the right occipital   parietal region.  The patient's head was placed in 3-point fixation using   Murphy headholder.  The patient's head was cleaned, prepped and draped in the   usual fashion.  The previous staples were then removed and the flap opened.    There was a small amount of blood underneath the  flap.  The titanium mesh was   removed and the dura was opened.  There was a very small contusion on the   surface of the brain; however, deep to this, there was a large clot measuring 5   x 4 cm.  We entered into this clot at the surface making a larger corticectomy   and placed a minimally invasive Vycor tube of 21 mm wide and 5 cm deep.  Through   this, we were able to follow the tract of the clot and remove the clot   completely based on microscopic visualization.  Removal of the clot was done   under the microscope with microsurgical technique.  Once the clot was removed,   the wounds were irrigated copiously.  All bleeding points were coagulated.  The   resection cavity was lined with Surgicel, which was glued in place with Evicel.    We slowly removed the tube as we placed the Surgicel and Evicel in layers.    Once this was removed, the wound was irrigated copiously.  All bleeding points   were coagulated.  A piece of Gelfoam was placed over the dura which was kept   open.  The titanium plate was replaced.  The soft tissues were then closed in   layers with staples in the skin.  The patient was transferred back to the ICU in   stable condition.  EBL for this procedure was 100 mL.    There were no intraprocedural complications.    All counts were correct at the end of surgery.    Dr. Jose Luis Powell was present during the procedure.  The patient was given   cryoprecipitate during this portion of the procedure as well as platelets given   her end-stage renal disease and thrombocytopenia.      TERRENCE  dd: 04/24/2019 07:24:10 (CDT)  td: 04/24/2019 09:09:59 ([facility])  Doc ID   #[id number]  Job ID #[id number]    CC:

## 2019-04-24 NOTE — PROGRESS NOTES
IGLESIA Fitzpatrick to bedside to assess patient. Pupils remain unequal with the L>R this time. No interventions ordered. Ordered to page nsgy about pupillary change. Will contact nsgy and continue to monitor patient.

## 2019-04-24 NOTE — SUBJECTIVE & OBJECTIVE
Review of Systems   Unable to perform ROS: Intubated       Objective:     Vitals:  Temp: 98.4 °F (36.9 °C)  Pulse: 71  Rhythm: normal sinus rhythm  BP: 132/68  MAP (mmHg): 92  Resp: 13  SpO2: 100 %  Oxygen Concentration (%): 40  O2 Device (Oxygen Therapy): ventilator  Vent Mode: Spont  Set Rate: 0 bmp  Vt Set: 350 mL  Pressure Support: 10 cmH20  PEEP/CPAP: 5 cmH20  Peak Airway Pressure: 15 cmH2O  Mean Airway Pressure: 7.9 cmH20  Plateau Pressure: 13 cmH20    Temp  Min: 98.1 °F (36.7 °C)  Max: 99.2 °F (37.3 °C)  Pulse  Min: 69  Max: 95  BP  Min: 98/55  Max: 158/79  MAP (mmHg)  Min: 71  Max: 110  Resp  Min: 12  Max: 22  SpO2  Min: 100 %  Max: 100 %  Oxygen Concentration (%)  Min: 40  Max: 40    04/23 0701 - 04/24 0700  In: 1293.1 [I.V.:733.1]  Out: 1500    Unmeasured Output  Stool Occurrence: 0       Physical Exam   Constitutional: She appears well-developed. No distress.   Thin appearing female, no acute distress  Somnolent but easily rousable   HENT:   Head: Atraumatic.   Noted sutures to R frontal forehead  L surgical drain in place    Eyes: Pupils are equal, round, and reactive to light. Conjunctivae and EOM are normal. No scleral icterus.   Neck: Normal range of motion.   Cardiovascular: Normal rate, regular rhythm and normal heart sounds.   No murmur heard.  Pulmonary/Chest: Effort normal and breath sounds normal. No respiratory distress. She has no wheezes.   Abdominal: Soft. Bowel sounds are normal. There is no tenderness.   No grimace to palpation   Musculoskeletal: She exhibits no edema.   Neurological: She is alert.   E4VTM6  No gross CN deficits  Follows commands  RUE 4/5, LUE 5/5, BLE 5/5   No appreciable sensory deficits    Skin: Skin is warm and dry.   Nursing note and vitals reviewed.        Medications:  Continuous    nicardipine Last Rate: Stopped (04/24/19 0920)   propofol Last Rate: Stopped (04/23/19 0530)   Scheduled    sodium chloride 0.9%  Once   calcitriol 0.5 mcg BID   calcium carbonate 2,000  mg TID   carvedilol 25 mg BID   ceFAZolin (ANCEF) IVPB 2 g Daily   chlorhexidine 15 mL BID   dexamethasone 4 mg Q6H   famotidine 20 mg Daily   lacosamide 50 mg BID   levETIRAcetam 500 mg BID   mupirocin 1 g BID   senna-docusate 8.6-50 mg 2 tablet Daily   tacrolimus 3 mg Daily   [START ON 4/25/2019] tacrolimus 3 mg Daily   PRN    dextrose 50% 12.5 g PRN   glucagon (human recombinant) 1 mg PRN   hydrALAZINE 10 mg Q4H PRN   hydrALAZINE 25 mg Q8H PRN   insulin aspart U-100 1-10 Units Q6H PRN   labetalol 10 mg Q4H PRN   ondansetron 4 mg Q6H PRN   oxyCODONE 5 mg Q6H PRN   sodium chloride 0.9% 10 mL PRN     Today I personally reviewed pertinent medications, lines/drains/airways, imaging, laboratory results, notably:    Diet  Diet NPO

## 2019-04-24 NOTE — PROGRESS NOTES
Pt pupils are now unequal, but still brisk. Right pupil 5.2, left 3.7. IGLESIA Fitzpatrick notified. Will report to bedside to assess patient. Will continue to monitor.

## 2019-04-24 NOTE — ASSESSMENT & PLAN NOTE
- Patient taken urgently to the OR following worsening neuro exam w/ CT demonstrating L temporal increased edema, hematoma, and midline shift  - S/p urgent L clot evacuation  - Post-op CT demonstrating:  Small volume residual blood products w/ post-operative air within L temporal lobe/surgical site. Persistent vasogenic edema w/ continued mass effect of 0.5 cm w/ rightward shift  - Improvement in neuro exam  - Continue to monitor w/ q1 hour neurochecks  - NSGY following

## 2019-04-24 NOTE — ASSESSMENT & PLAN NOTE
- On Verapamil, Irbasartan, Hydralazine, Coreg, Clonidine at home  - resume home Coreg 25 mg BID  - DisContinue cardene gtt  - Hydralazine PRN

## 2019-04-24 NOTE — PLAN OF CARE
Problem: Adult Inpatient Plan of Care  Goal: Plan of Care Review  POC reviewed with pt at 0500. Pt unable to verbalize understanding due to ETT. Questions and concerns addressed. Pt remained afebrile. No acute events overnight. Pt progressing toward goals. Will continue to monitor. See flowsheets for full assessment and VS info

## 2019-04-25 PROBLEM — E21.0: Status: ACTIVE | Noted: 2019-01-01

## 2019-04-25 NOTE — TREATMENT PLAN
Brief hepatology note    Tacrolimus level not obtained.  Daily levels ordered  Will reduce dose of prograf to 3mg BID pending level in the AM.  Will continue to follow

## 2019-04-25 NOTE — NURSING
1500 Pt hypertensive, restless/agitated, decreased alertness. GCS 10 from 13 last hour. MD Tova notified and came to bedside.    1505 STAT CTH, labs, and mannitol ordered.    1545 Orders complete. Pt back in room. VSS. Will continue to monitor.

## 2019-04-25 NOTE — SUBJECTIVE & OBJECTIVE
Interval History: NAEON. HV pulled out yesterday. Exam improving with some receptive aphasia. She recived HA today and became restless with SBP up to 200s and had change in exam. Follow up scan with more punctuate ICH and perilesional edema.     Medications:  Continuous Infusions:   nicardipine 3 mg/hr (04/25/19 1500)     Scheduled Meds:   sodium chloride 0.9%   Intravenous Once    calcitriol  0.5 mcg Oral BID    calcium carbonate  2,000 mg Oral TID    carvedilol  25 mg Oral BID    dexamethasone  4 mg Intravenous Q6H    [START ON 4/26/2019] famotidine  20 mg Oral Daily    hydrALAZINE  50 mg Oral Q8H    lacosamide  50 mg Oral BID    levETIRAcetam  500 mg Oral BID    mannitol 20%        mupirocin  1 g Nasal BID    [START ON 4/26/2019] senna-docusate 8.6-50 mg  2 tablet Oral Daily    [START ON 4/26/2019] tacrolimus  3 mg Oral Daily    tacrolimus  3 mg Oral Daily     PRN Meds:dextrose 50%, glucagon (human recombinant), hydrALAZINE, insulin aspart U-100, labetalol, midazolam, ondansetron, oxyCODONE, sodium chloride 0.9%     Review of Systems   Unable to aphasia   Objective:     Weight: 54.8 kg (120 lb 13 oz)  Body mass index is 22.1 kg/m².  Vital Signs (Most Recent):  Temp: 97.4 °F (36.3 °C) (04/25/19 1501)  Pulse: 92 (04/25/19 1538)  Resp: (!) 40 (04/25/19 1538)  BP: 135/63 (04/25/19 1538)  SpO2: 100 % (04/25/19 1538) Vital Signs (24h Range):  Temp:  [97.4 °F (36.3 °C)-98.8 °F (37.1 °C)] 97.4 °F (36.3 °C)  Pulse:  [72-94] 92  Resp:  [9-40] 40  SpO2:  [95 %-100 %] 100 %  BP: (117-179)/(56-97) 135/63  Arterial Line BP: (113-192)/(63-94) 190/92     Date 04/25/19 0700 - 04/26/19 0659   Shift 5432-8812 8431-9189 4258-5960 24 Hour Total   INTAKE   P.O. 380   380   I.V.(mL/kg) 40(0.7) 6.8(0.1)  46.8(0.9)   IV Piggyback 100   100   Shift Total(mL/kg) 520(9.5) 6.8(0.1)  526.8(9.6)   OUTPUT   Shift Total(mL/kg)       Weight (kg) 54.8 54.8 54.8 54.8                        Hemodialysis AV Fistula Left forearm  (Active)   Needle Size 15ga 4/25/2019  1:20 PM   Site Assessment Clean;Dry;Intact 4/25/2019  1:20 PM   Patency Present;Thrill;Bruit 4/25/2019  1:20 PM   Status Accessed 4/25/2019  1:20 PM   Flows Good 4/25/2019  3:05 AM   Dressing Intervention Dressing reinforced 4/25/2019  3:05 AM   Dressing Status Clean;Intact;Dry 4/25/2019 11:01 AM   Site Condition No complications 4/25/2019 11:01 AM   Dressing Occlusive 4/25/2019 11:01 AM   Drainage Description Other (Comment) 4/14/2018  5:26 PM       Neurosurgery Physical Exam  E4V3M6  SHRAVAN MEMBRENO AG  Follow simple commands but not complex   Receptive aphasia   Wound c/d/i        Significant Labs:  Recent Labs   Lab 04/24/19 0208 04/25/19 0215 04/25/19  1505   * 136* 155*    133* 133*  133*   K 4.7 5.6* 3.9    98 97   CO2 21* 20* 20*   BUN 53* 79* 52*   CREATININE 7.5* 9.3* 6.2*   CALCIUM 7.0* 5.5* 8.4*   MG 2.1  --   --      Recent Labs   Lab 04/24/19  1749 04/25/19  0828 04/25/19  1507   WBC 7.52 8.71 11.39   HGB 8.9* 9.3* 11.3*   HCT 27.8* 28.6* 33.8*   * 151 189     Recent Labs   Lab 04/24/19 0208 04/24/19  0847 04/24/19 2019 04/25/19  0215 04/25/19  0828   INR 1.1  --   --  1.2  --    APTT  --  26.1 26.0  --  26.3     Microbiology Results (last 7 days)     ** No results found for the last 168 hours. **        All pertinent labs from the last 24 hours have been reviewed.    Significant Diagnostics:  CT: Ct Head Without Contrast    Result Date: 4/25/2019  Evolving operative change from left temporal craniectomy and cranioplasty for left posterior temporal parenchymal hematoma evacuation. There is reduced postoperative pneumocephalus with small volumes of increased parenchymal hemorrhage within and about the resection cavity. Evolving mass effect and edema with continued 5-6 mm of rightward midline shift similar to prior. There is continued small volume intraventricular hemorrhage with slight increase distension of the lateral ventricles  concerning for component of evolving hydrocephalus. Continued diffuse effacement cerebral sulci at the vertex concerning for sequela of cerebral edema or evolving hydrocephalus Electronically signed by: Reggie Espinoza DO Date:    04/25/2019 Time:    15:50

## 2019-04-25 NOTE — PLAN OF CARE
Problem: Adult Inpatient Plan of Care  Goal: Plan of Care Review  Outcome: Ongoing (interventions implemented as appropriate)  POC reviewed with pt and mother at 1700. Pt and mother verbalized understanding. Questions and concerns addressed. Pt extubated today and tolerating well. Hemovac drain taken out per neurosurgery. Pt progressing toward goals. Will continue to monitor. See flowsheets for full assessment and VS info.

## 2019-04-25 NOTE — HOSPITAL COURSE
4/25: NAEON. HV pulled out yesterday. Exam improving with some receptive aphasia. She recived HA today and became restless with SBP up to 200s and had change in exam. Follow up scan with more punctuate ICH and perilesional edema.   4/26: Exam stable. NAEON. CT scan stable. On Cardene.   4/29: NAEON. Still on Cardene. Exam improving. No issues per nursing.   4/30: Blood pressure not controlled. Exam improving. CT scan stable. Off Cardene   5/2: Off Cardene this am. No issues per nursing. Exam stable. PLT 78, transfusing one unit.   5/3-5/4: NAEON. Stable on exam. Required some PRN for SBP in 170s otherwise no issues.   5/5: Stepped down. NAEON. Stable in exam. PLT 78 after transfusion. SBP top 170s overnight.

## 2019-04-25 NOTE — ASSESSMENT & PLAN NOTE
2/2 non-compliance with activated vit D and calcium supplementation s/p parathyroidectomy  -PTH elevated @ 407  -phos levels daily  -continue calcitriol 0.5 mcg po BID  -continue Calcium Carbonate 2 g TID   -calcium gluconate 3 mg IVPB x 1 now  -high calcium bath

## 2019-04-25 NOTE — PLAN OF CARE
Problem: Adult Inpatient Plan of Care  Goal: Plan of Care Review  POC reviewed with pt at 0300. Pt unable to verbalize understanding due to global aphasia. Family verbalized understanding. Questions and concerns addressed. Pt remained afebrile overnight. No acute events overnight. Pt progressing toward goals. Will continue to monitor. See flowsheets for full assessment and VS info

## 2019-04-25 NOTE — PROGRESS NOTES
Bedside Hd , cannulated lower left arm avf with 15 gauge needles . bfr 300/. Net uf  Goal 2 to 2.5 liters as tolerated .  This hd is one on one treatment. Vital stable.

## 2019-04-25 NOTE — SUBJECTIVE & OBJECTIVE
Interval History:   Successfully extubated yesterday, oxygenating well on RA this morning w/o complaints.  Able to follow commands.  Mild hyperkalemia on AM labs.  Remains hypocalcemic with iCA of 0.59.    Review of patient's allergies indicates:   Allergen Reactions    Chloral hydrate Hallucinations     Other reaction(s): Hallucinations  Other reaction(s): Hives    Hydrocodone Other (See Comments)     Mental status changes     Current Facility-Administered Medications   Medication Frequency    0.9%  NaCl infusion Once    calcitriol solution 0.5 mcg BID    calcium carbonate 500 mg/5 mL (1,250 mg/5 mL) suspension 2,000 mg TID    carvedilol tablet 25 mg BID    dexamethasone injection 4 mg Q6H    dextrose 50% injection 12.5 g PRN    [START ON 4/26/2019] famotidine tablet 20 mg Daily    glucagon (human recombinant) injection 1 mg PRN    hydrALAZINE injection 10 mg Q4H PRN    hydrALAZINE tablet 25 mg Q8H PRN    insulin aspart U-100 pen 1-10 Units Q6H PRN    labetalol 20 mg/4 mL (5 mg/mL) IV syring Q4H PRN    lacosamide tablet 50 mg BID    levETIRAcetam tablet 500 mg BID    mupirocin 2 % ointment 1 g BID    ondansetron disintegrating tablet 4 mg Q6H PRN    oxyCODONE immediate release tablet 5 mg Q6H PRN    [START ON 4/26/2019] senna-docusate 8.6-50 mg per tablet 2 tablet Daily    sodium chloride 0.9% flush 10 mL PRN    [START ON 4/26/2019] tacrolimus (PROGRAF) 1 mg/mL oral syringe Daily    tacrolimus (PROGRAF) 1 mg/mL oral syringe Daily       Objective:     Vital Signs (Most Recent):  Temp: 98 °F (36.7 °C) (04/25/19 1101)  Pulse: 80 (04/25/19 1200)  Resp: (!) 21 (04/25/19 1200)  BP: 129/64 (04/25/19 1200)  SpO2: 100 % (04/25/19 1200)  O2 Device (Oxygen Therapy): room air (04/25/19 0725) Vital Signs (24h Range):  Temp:  [98 °F (36.7 °C)-98.8 °F (37.1 °C)] 98 °F (36.7 °C)  Pulse:  [73-93] 80  Resp:  [11-29] 21  SpO2:  [95 %-100 %] 100 %  BP: (106-148)/(51-81) 129/64  Arterial Line BP:  (113-152)/(61-82) 146/72     Weight: 54.8 kg (120 lb 13 oz) (04/22/19 1100)  Body mass index is 22.1 kg/m².  Body surface area is 1.55 meters squared.    I/O last 3 completed shifts:  In: 592.7 [I.V.:472.7; NG/GT:120]  Out: 0     Physical Exam   Constitutional: She is oriented to person, place, and time. She appears well-developed. No distress.   HENT:   Right Ear: External ear normal.   Left Ear: External ear normal.   Facial edema   Eyes: Conjunctivae and EOM are normal. Right eye exhibits no discharge. Left eye exhibits no discharge.   Neck: Normal range of motion. Neck supple.   Cardiovascular: Normal rate and regular rhythm. Exam reveals no gallop and no friction rub.   No murmur heard.  Pulmonary/Chest: Effort normal and breath sounds normal. No respiratory distress. She has no wheezes. She has no rales.   Abdominal: Soft. Bowel sounds are normal. She exhibits no distension. There is no tenderness.   Musculoskeletal: Normal range of motion. She exhibits no edema or deformity.   Neurological: She is alert and oriented to person, place, and time.   Skin: Skin is warm and dry. She is not diaphoretic.   Psychiatric: She has a normal mood and affect. Her behavior is normal.       Significant Labs:  CBC:   Recent Labs   Lab 04/25/19  0828   WBC 8.71   RBC 3.54*   HGB 9.3*   HCT 28.6*      MCV 81*   MCH 26.3*   MCHC 32.5     CMP:   Recent Labs   Lab 04/25/19  0215   *   CALCIUM 5.5*   ALBUMIN 2.8*   PROT 7.1   *   K 5.6*   CO2 20*   CL 98   BUN 79*   CREATININE 9.3*   ALKPHOS 511*   ALT 14   AST 22   BILITOT 0.4

## 2019-04-25 NOTE — PROGRESS NOTES
[facility]  Neurosurgery  Progress Note    Subjective:     History of Present Illness: No notes on file    Post-Op Info:  Procedure(s) (LRB):  CRANIOTOMY-- left crani for clot evac with microscrope (Left)   3 Days Post-Op     Interval History: NAEON. HV pulled out yesterday. Exam improving with some receptive aphasia. She recived HA today and became restless with SBP up to 200s and had change in exam. Follow up scan with more punctuate ICH and perilesional edema.     Medications:  Continuous Infusions:   nicardipine 3 mg/hr (04/25/19 1500)     Scheduled Meds:   sodium chloride 0.9%   Intravenous Once    calcitriol  0.5 mcg Oral BID    calcium carbonate  2,000 mg Oral TID    carvedilol  25 mg Oral BID    dexamethasone  4 mg Intravenous Q6H    [START ON 4/26/2019] famotidine  20 mg Oral Daily    hydrALAZINE  50 mg Oral Q8H    lacosamide  50 mg Oral BID    levETIRAcetam  500 mg Oral BID    mannitol 20%        mupirocin  1 g Nasal BID    [START ON 4/26/2019] senna-docusate 8.6-50 mg  2 tablet Oral Daily    [START ON 4/26/2019] tacrolimus  3 mg Oral Daily    tacrolimus  3 mg Oral Daily     PRN Meds:dextrose 50%, glucagon (human recombinant), hydrALAZINE, insulin aspart U-100, labetalol, midazolam, ondansetron, oxyCODONE, sodium chloride 0.9%     Review of Systems   Unable to aphasia   Objective:     Weight: 54.8 kg (120 lb 13 oz)  Body mass index is 22.1 kg/m².  Vital Signs (Most Recent):  Temp: 97.4 °F (36.3 °C) (04/25/19 1501)  Pulse: 92 (04/25/19 1538)  Resp: (!) 40 (04/25/19 1538)  BP: 135/63 (04/25/19 1538)  SpO2: 100 % (04/25/19 1538) Vital Signs (24h Range):  Temp:  [97.4 °F (36.3 °C)-98.8 °F (37.1 °C)] 97.4 °F (36.3 °C)  Pulse:  [72-94] 92  Resp:  [9-40] 40  SpO2:  [95 %-100 %] 100 %  BP: (117-179)/(56-97) 135/63  Arterial Line BP: (113-192)/(63-94) 190/92     Date 04/25/19 0700 - 04/26/19 0659   Shift 0547-7975 1392-2350 8574-9927 24 Hour Total   INTAKE   P.O. 380   380    I.V.(mL/kg) 40(0.7) 6.8(0.1)  46.8(0.9)   IV Piggyback 100   100   Shift Total(mL/kg) 520(9.5) 6.8(0.1)  526.8(9.6)   OUTPUT   Shift Total(mL/kg)       Weight (kg) 54.8 54.8 54.8 54.8                        Hemodialysis AV Fistula Left forearm (Active)   Needle Size 15ga 4/25/2019  1:20 PM   Site Assessment Clean;Dry;Intact 4/25/2019  1:20 PM   Patency Present;Thrill;Bruit 4/25/2019  1:20 PM   Status Accessed 4/25/2019  1:20 PM   Flows Good 4/25/2019  3:05 AM   Dressing Intervention Dressing reinforced 4/25/2019  3:05 AM   Dressing Status Clean;Intact;Dry 4/25/2019 11:01 AM   Site Condition No complications 4/25/2019 11:01 AM   Dressing Occlusive 4/25/2019 11:01 AM   Drainage Description Other (Comment) 4/14/2018  5:26 PM       Neurosurgery Physical Exam  E4V3M6  SHRAVAN MEMBRENO AG  Follow simple commands but not complex   Receptive aphasia   Wound c/d/i        Significant Labs:  Recent Labs   Lab 04/24/19 0208 04/25/19 0215 04/25/19  1505   * 136* 155*    133* 133*  133*   K 4.7 5.6* 3.9    98 97   CO2 21* 20* 20*   BUN 53* 79* 52*   CREATININE 7.5* 9.3* 6.2*   CALCIUM 7.0* 5.5* 8.4*   MG 2.1  --   --      Recent Labs   Lab 04/24/19  1749 04/25/19  0828 04/25/19  1507   WBC 7.52 8.71 11.39   HGB 8.9* 9.3* 11.3*   HCT 27.8* 28.6* 33.8*   * 151 189     Recent Labs   Lab 04/24/19 0208 04/24/19  0847 04/24/19 2019 04/25/19  0215 04/25/19  0828   INR 1.1  --   --  1.2  --    APTT  --  26.1 26.0  --  26.3     Microbiology Results (last 7 days)     ** No results found for the last 168 hours. **        All pertinent labs from the last 24 hours have been reviewed.    Significant Diagnostics:  CT: Ct Head Without Contrast    Result Date: 4/25/2019  Evolving operative change from left temporal craniectomy and cranioplasty for left posterior temporal parenchymal hematoma evacuation. There is reduced postoperative pneumocephalus with small volumes of increased parenchymal hemorrhage within and  about the resection cavity. Evolving mass effect and edema with continued 5-6 mm of rightward midline shift similar to prior. There is continued small volume intraventricular hemorrhage with slight increase distension of the lateral ventricles concerning for component of evolving hydrocephalus. 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     She recived HA today and became restless with SBP up to 200s and had change in exam. Follow up scan with more punctuate ICH and perilesional edema as well as IVH.    POD#3    Follow up scan in 6hrs  Continue neurochecks q1h  HOB>30  Keppra 500mg BID  cEEG to r/o any seizure   Na 140-150  Continue Dex 4q6hrs for cerebral edema  HDS, keep SBP <140, now on Cardene   Sating well at RA  AF and WBC WNL and H/H stable  Daily PT/OT  DVTx: SCD/TCD  Further management of care per NCC  Will continue to monitor. Please page NSGY with any changes in exam                     Kimberly Roy MD  Neurosurgery  [facility]

## 2019-04-25 NOTE — PROGRESS NOTES
Ca 5.5 and Na 133. IGLESIA Fitzpatrick notified. No new orders at this time. Will continue to monitor.

## 2019-04-25 NOTE — PT/OT/SLP EVAL
Speech Language Pathology Evaluation  Bedside Swallow    Patient Name:  Holly Patel   MRN:  [id number]  Admitting Diagnosis: Brain tumor    Recommendations:                 General Recommendations:  Dysphagia therapy and Speech language evaluation  Diet recommendations:  Regular, Thin   Aspiration Precautions: HOB to 90 degrees and Standard aspiration precautions   General Precautions: Standard, aspiration, fall  Communication strategies:  yes/no questions only and provide increased time to answer    History:     Past Medical History:   Diagnosis Date    Anemia in ESRD (end-stage renal disease) 10/12/2015    dialysis tues, thursday, sat; access left arm    Chronic rejection of liver transplant 3/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 ()    Moderate protein-calorie malnutrition 2017    MRSA bacteremia 2017    Pneumonia     Prophylactic immunotherapy 2014    Renovascular hypertension 10/2/2015    Secondary hyperparathyroidism 2017    Seizures     Sialadenitis 3/21/2018    Thrombocytopenia 2016       Past Surgical History:   Procedure Laterality Date    BIOPSY, LIVER, TRANSJUGULAR APPROACH N/A 2018    Performed by [facility] Diagnostic Provider at [facility] OR 2ND FLR    BIOPSY-LIVER N/A 2017    Performed by [facility] Provider at [facility] OR 2ND FLR    BIOPSY-LIVER N/A 3/22/2016    Performed by [facility] Diagnostic Provider at [facility] OR 2ND FLR     SECTION      x 2    CONIZATION-CERVICAL-LEEP N/A 6/15/2018    Performed by [facility]elam [facility]roquin [facility] at [facility] OR     "[id number]; upper extremity Left 7/17/2015    Performed by Idalia Diaz MD at [facility] OR 2ND FLR    CRANIOPLASTY  4/22/2019    Performed by Jose Luis Powell MD at [facility] OR 2ND FLR    CRANIOTOMY-- left crani for clot evac with microscrope Left 4/22/2019    Performed by Jose Luis Powell MD at [facility] OR 2ND FLR    EMBOLIZATION, BLOOD VESSEL N/A 7/21/2018    Performed by Aren Ramos MD at [facility] CATH LAB    Exam Under Anesthesia N/A 8/8/2018    Performed by Neelam Marroquin MD at [facility] OR 2ND FLR    Exam under anesthesia N/A 6/19/2018    Performed by Neelam Marroquin MD at [facility] OR    Exam under anesthesia (ADD ON ) N/A 7/9/2018    Performed by NICKIE Alvarez MD at [facility] OR    Exam under anesthesia -cervical suturing  N/A 7/26/2018    Performed by Lei Sims III, MD at [facility] OR    EXCISION, NEOPLASM, SKULL with Cranioplasty Left 4/22/2019    Performed by Jose Luis Powell MD at [facility] OR 2ND FLR    FISTULOGRAM Left 12/4/2015    Performed by Idalia Diaz MD at [facility] CATH LAB    LIVER BIOPSY      LIVER TRANSPLANT  09/1992    PARATHYROIDECTOMY, Minimally Invasive Bilateral Exploration Bilateral 3/27/2019    Performed by Ashley Guallpa MD at [facility] OR 2ND FLR    SUTURE REPAIR,CERVIX  6/19/2018    Performed by Neelam Marroquin MD at [facility] OR    TUBAL LIGATION  2010       Social History:pt unable to answer    Prior diet:unknown        Subjective     "nasty" after drinking water  Patient goals: unable to state    Pain/Comfort:  Pain Rating 1: 0/10  Pain Rating Post-Intervention 1: 0/10    Objective:     Oral Musculature Evaluation  Oral Musculature: unable to assess due to poor participation/comprehension  Dentition: present and adequate  Mucosal Quality: good  Volitional Cough: fair  Voice Prior to PO Intake: clear    Bedside Swallow Eval:   Consistencies Assessed:  · Thin liquids cup and straw sips x3 cups  · Puree teaspoon x5  · Solids cracker     Oral Phase: "   · WFL    Pharyngeal Phase:   · no overt clinical signs/symptoms of pharyngeal dysphagia  · throat clearing    Compensatory Strategies  · None    Treatment: Nursing reported pt tolerated lunch with no difficulty. He reported some coughing when not eating/drinking. Pt inconsistently modeled simple commands. Vocal quality was clear and strong. Pt with one delayed cough and one throat clear during 3 cups of thin liquids. Pt safe to continue regular diet and thin liquids but pt must be upright for all meals. If increased coughing or throat clears noted during meals, avoid straws. Continue to crush meds. White board updated.     Assessment:     Holly Patel is a 28 y.o. female with an SLP diagnosis of Aphasia and Dysphagia.  She safe to continue regular diet and thin liquids with aspiration precuations.    Goals:   Multidisciplinary Problems     SLP Goals        Problem: SLP Goal    Goal Priority Disciplines Outcome   SLP Goal     SLP    Description:  Goals to be met 5/1  1. Pt will tolerate regular diet and thin liquids with no overt s/s of aspiration  2 pt will participate in speech/languag eval                    Plan:     · Patient to be seen:  5 x/week   · Plan of Care expires:     · Plan of Care reviewed with:  patient   · SLP Follow-Up:  Yes       Discharge recommendations:  other (see comments)(tbt)       Time Tracking:     SLP Treatment Date:   04/25/19  Speech Start Time:  1318  Speech Stop Time:  1335     Speech Total Time (min):  17 min    Billable Minutes: Eval Swallow and Oral Function 17    ROM Donald, CCC-SLP  04/25/2019

## 2019-04-25 NOTE — PLAN OF CARE
Problem: SLP Goal  Goal: SLP Goal  Pt safe for regular diet and thin liquids with aspiration precautions.    .ROM Donald, CCC-SLP  4/25/2019

## 2019-04-25 NOTE — PROGRESS NOTES
Patient has become ore restless and hypertensive , no change even after medicated , patient remained very restless  And hypertensive , Intensive care Md , request Hd to to be stopped and take pat for stat CT scan.  Blood returned and needles pulled . Net uf 1300 ml removed . Post bleeding time < 4 minutes.  This was a bedside one on one Hd treatment..

## 2019-04-25 NOTE — PLAN OF CARE
04/25/19 1230   Discharge Reassessment   Assessment Type Discharge Planning Reassessment   Provided patient/caregiver education on the expected discharge date and the discharge plan Yes   Do you have any problems affording any of your prescribed medications? No   Discharge Plan A Rehab   Discharge Plan B Home with family   DME Needed Upon Discharge  other (see comments)  (tbd)   Patient choice form signed by patient/caregiver N/A   Anticipated Discharge Disposition Rehab   Can the patient answer the patient profile reliably? No, cognitively impaired   How does the patient rate their overall health at the present time?   (tabatha)   Describe the patient's ability to walk at the present time. No restrictions   How often would a person be available to care for the patient? Often   Number of comorbid conditions (as recorded on the chart) Five or more   During the past month, has the patient often been bothered by feeling down, depressed or hopeless? No   During the past month, has the patient often been bothered by little interest or pleasure in doing things? No   Post-Acute Status   Post-Acute Authorization Placement   Post-Acute Placement Status Awaiting Internal Medical Clearance   Discharge Delays None known at this time     PT/OT ordered 04/25/19         Delma Mercado RN, CCRN-K, [facility]  Neuro-Critical Care   [phone]

## 2019-04-25 NOTE — ASSESSMENT & PLAN NOTE
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     She recived HA today and became restless with SBP up to 200s and had change in exam. Follow up scan with more punctuate ICH and perilesional edema as well as IVH.    POD#3    Follow up scan in 6hrs  Continue neurochecks q1h  HOB>30  Keppra 500mg BID  cEEG to r/o any seizure   Na 140-150  Continue Dex 4q6hrs for cerebral edema  HDS, keep SBP <140, now on Cardene   Sating well at RA  AF and WBC WNL and H/H stable  Daily PT/OT  DVTx: SCD/TCD  Further management of care per NCC  Will continue to monitor. Please page NSGY with any changes in exam

## 2019-04-25 NOTE — PROGRESS NOTES
[facility]  Nephrology  Progress Note    Patient Name: Holly Patel  MRN: [id number]  Admission Date: 4/22/2019  Hospital Length of Stay: 3 days  Attending Provider: Jaciel Roblero MD   Primary Care Physician: Stan Sosa MD  Principal Problem:Brain tumor    Subjective:     Interval History:   Successfully extubated yesterday, oxygenating well on RA this morning w/o complaints.  Able to follow commands.  Mild hyperkalemia on AM labs.  Remains hypocalcemic with iCA of 0.59.    Review of patient's allergies indicates:   Allergen Reactions    Chloral hydrate Hallucinations     Other reaction(s): Hallucinations  Other reaction(s): Hives    Hydrocodone Other (See Comments)     Mental status changes     Current Facility-Administered Medications   Medication Frequency    0.9%  NaCl infusion Once    calcitriol solution 0.5 mcg BID    calcium carbonate 500 mg/5 mL (1,250 mg/5 mL) suspension 2,000 mg TID    carvedilol tablet 25 mg BID    dexamethasone injection 4 mg Q6H    dextrose 50% injection 12.5 g PRN    [START ON 4/26/2019] famotidine tablet 20 mg Daily    glucagon (human recombinant) injection 1 mg PRN    hydrALAZINE injection 10 mg Q4H PRN    hydrALAZINE tablet 25 mg Q8H PRN    insulin aspart U-100 pen 1-10 Units Q6H PRN    labetalol 20 mg/4 mL (5 mg/mL) IV syring Q4H PRN    lacosamide tablet 50 mg BID    levETIRAcetam tablet 500 mg BID    mupirocin 2 % ointment 1 g BID    ondansetron disintegrating tablet 4 mg Q6H PRN    oxyCODONE immediate release tablet 5 mg Q6H PRN    [START ON 4/26/2019] senna-docusate 8.6-50 mg per tablet 2 tablet Daily    sodium chloride 0.9% flush 10 mL PRN    [START ON 4/26/2019] tacrolimus (PROGRAF) 1 mg/mL oral syringe Daily    tacrolimus (PROGRAF) 1 mg/mL oral syringe Daily       Objective:     Vital Signs (Most Recent):  Temp: 98 °F (36.7 °C) (04/25/19 1101)  Pulse: 80 (04/25/19 1200)  Resp: (!) 21 (04/25/19 1200)  BP: 129/64  (04/25/19 1200)  SpO2: 100 % (04/25/19 1200)  O2 Device (Oxygen Therapy): room air (04/25/19 0725) Vital Signs (24h Range):  Temp:  [98 °F (36.7 °C)-98.8 °F (37.1 °C)] 98 °F (36.7 °C)  Pulse:  [73-93] 80  Resp:  [11-29] 21  SpO2:  [95 %-100 %] 100 %  BP: (106-148)/(51-81) 129/64  Arterial Line BP: (113-152)/(61-82) 146/72     Weight: 54.8 kg (120 lb 13 oz) (04/22/19 1100)  Body mass index is 22.1 kg/m².  Body surface area is 1.55 meters squared.    I/O last 3 completed shifts:  In: 592.7 [I.V.:472.7; NG/GT:120]  Out: 0     Physical Exam   Constitutional: She is oriented to person, place, and time. She appears well-developed. No distress.   HENT:   Right Ear: External ear normal.   Left Ear: External ear normal.   Facial edema   Eyes: Conjunctivae and EOM are normal. Right eye exhibits no discharge. Left eye exhibits no discharge.   Neck: Normal range of motion. Neck supple.   Cardiovascular: Normal rate and regular rhythm. Exam reveals no gallop and no friction rub.   No murmur heard.  Pulmonary/Chest: Effort normal and breath sounds normal. No respiratory distress. She has no wheezes. She has no rales.   Abdominal: Soft. Bowel sounds are normal. She exhibits no distension. There is no tenderness.   Musculoskeletal: Normal range of motion. She exhibits no edema or deformity.   Neurological: She is alert and oriented to person, place, and time.   Skin: Skin is warm and dry. She is not diaphoretic.   Psychiatric: She has a normal mood and affect. Her behavior is normal.       Significant Labs:  CBC:   Recent Labs   Lab 04/25/19  0828   WBC 8.71   RBC 3.54*   HGB 9.3*   HCT 28.6*      MCV 81*   MCH 26.3*   MCHC 32.5     CMP:   Recent Labs   Lab 04/25/19  0215   *   CALCIUM 5.5*   ALBUMIN 2.8*   PROT 7.1   *   K 5.6*   CO2 20*   CL 98   BUN 79*   CREATININE 9.3*   ALKPHOS 511*   ALT 14   AST 22   BILITOT 0.4            Assessment/Plan:     ESRD on hemodialysis  ESRD on iHD TTS  [facility]    [place]  3.5 hours  EDW ~ 50 kg  LFA AVF    Plan/Recommendations:  -HD today for metabolic clearance/volume management  -3 hour treatment.  2-2.5L as tolerated  -high calcium bath  -Calcium Gluconate 3 gm IVPB x 1 now  -continue strict I/O's  -daily renal panel with phos added      Hypocalcemia  2/2 non-compliance with activated vit D and calcium supplementation s/p parathyroidectomy  -PTH elevated @ 407  -phos levels daily  -continue calcitriol 0.5 mcg po BID  -continue Calcium Carbonate 2 g TID   -calcium gluconate 3 mg IVPB x 1 now  -high calcium bath        Raheem Trujillo, ROXIE  Nephrology  [facility]

## 2019-04-25 NOTE — PLAN OF CARE
Problem: Adult Inpatient Plan of Care  Goal: Plan of Care Review  Outcome: Ongoing (interventions implemented as appropriate)  POC reviewed with pt and family at 1600. Pt unable to verbalize understanding. Questions and concerns addressed with pt's mother. Pt had neuro change this afternoon. STAT CTH obtained. Mannitol pushed per order. NPO. NGT placed; pending KUB. Repeat CTH tonight. cEEG in place. Continuous cardene infusing. Will continue to monitor. See flowsheets for full assessment and VS info.

## 2019-04-25 NOTE — PROGRESS NOTES
ICU Progress Note  Neurocritical Care    Admit Date: 4/22/2019  LOS: 3    CC: Brain tumor    Code Status: Full Code     SUBJECTIVE:     Interval History/Significant Events:     Awake  Alert  Aphasic  Hyperkalemia  Hyponatremia  ESRD        Medications:  Continuous Infusions:  Scheduled Meds:   sodium chloride 0.9%   Intravenous Once    calcitriol  0.5 mcg Per NG tube BID    calcium carbonate  2,000 mg Per NG tube TID    calcium gluconate IVPB  3,000 mg Intravenous Once    carvedilol  25 mg Per OG tube BID    ceFAZolin (ANCEF) IVPB  2 g Intravenous Daily    chlorhexidine  15 mL Mouth/Throat BID    dexamethasone  4 mg Intravenous Q6H    famotidine  20 mg Per NG tube Daily    lacosamide  50 mg Per OG tube BID    levETIRAcetam  500 mg Per NG tube BID    mupirocin  1 g Nasal BID    senna-docusate 8.6-50 mg  2 tablet Per OG tube Daily    tacrolimus  3 mg Per NG tube Daily    tacrolimus  3 mg Per NG tube Daily     PRN Meds:.dextrose 50%, glucagon (human recombinant), hydrALAZINE, hydrALAZINE, insulin aspart U-100, labetalol, ondansetron, oxyCODONE, sodium chloride 0.9%    OBJECTIVE:   Vital Signs (Most Recent):   Temp: 98.1 °F (36.7 °C) (04/25/19 0701)  Pulse: 84 (04/25/19 0900)  Resp: (!) 21 (04/25/19 0900)  BP: 139/68 (04/25/19 0900)  SpO2: 97 % (04/25/19 0900)    Vital Signs (24h Range):   Temp:  [98 °F (36.7 °C)-98.8 °F (37.1 °C)] 98.1 °F (36.7 °C)  Pulse:  [69-93] 84  Resp:  [11-29] 21  SpO2:  [95 %-100 %] 97 %  BP: (106-148)/(51-81) 139/68  Arterial Line BP: (113-152)/(61-82) 131/76    ICP/CPP (Last 24h):        I & O (Last 24h):     Intake/Output Summary (Last 24 hours) at 4/25/2019 1018  Last data filed at 4/25/2019 0900  Gross per 24 hour   Intake 260 ml   Output 0 ml   Net 260 ml     Physical Exam:  GA: Alert, comfortable, no acute distress.   HEENT: No scleral icterus or JVD.   Pulmonary: Clear to auscultation A/P/L. No wheezing, crackles, or rhonchi.  Cardiac: RRR S1 & S2 w/o  rubs/murmurs/gallops.   Abdominal: Bowel sounds present x 4. No appreciable hepatosplenomegaly.  Skin: No jaundice, rashes, or visible lesions.  Neuro:        Awake  Alert  Aphasic  Moves all 4 exts    Vent Data:   Vent Mode: Spont  Oxygen Concentration (%):  [32-40] 32  Resp Rate Total:  [11 br/min-15 br/min] 15 br/min  Vt Set:  [350 mL] 350 mL  PEEP/CPAP:  [5 cmH20] 5 cmH20  Pressure Support:  [10 cmH20] 10 cmH20  Mean Airway Pressure:  [7.9 cmH20-8.6 cmH20] 7.9 cmH20    Lines/Drains/Airway:       [place]- dc       Arterial Line 04/22/19 1659 Right Radial (Active)   Site Assessment Clean;Dry;Intact;No redness;No swelling 4/25/2019  7:01 AM   Line Status Pulsatile blood flow 4/25/2019  7:01 AM   Art Line Waveform Appropriate 4/25/2019  7:01 AM   Arterial Line Interventions Zeroed and calibrated;Leveled 4/25/2019  7:01 AM   Color/Movement/Sensation Capillary refill less than 3 sec 4/25/2019  7:01 AM   Dressing Type Transparent 4/25/2019  7:01 AM   Dressing Status Biopatch in place;Clean;Dry;Intact 4/25/2019  7:01 AM   Dressing Intervention Dressing reinforced 4/25/2019  3:05 AM   Dressing Change Due 04/29/19 4/25/2019  7:01 AM           Hemodialysis AV Fistula Left forearm (Active)   Needle Size Buttonhole 4/25/2019  7:01 AM   Site Assessment Clean;Dry;Intact;No redness;No swelling 4/25/2019  7:01 AM   Patency Present;Thrill 4/25/2019  7:01 AM   Status Deaccessed 4/25/2019  7:01 AM   Flows Good 4/25/2019  3:05 AM   Dressing Intervention Dressing reinforced 4/25/2019  3:05 AM   Dressing Status Clean;Intact;Dry 4/25/2019  7:01 AM   Site Condition No complications 4/25/2019  7:01 AM   Dressing Occlusive 4/25/2019  7:01 AM   Drainage Description Other (Comment) 4/14/2018  5:26 PM     Nutrition/Tube Feeds (if NPO state why): po    Labs:  ABG: No results for input(s): PH, PO2, PCO2, HCO3, POCSATURATED, BE in the last 24 hours.  BMP:  Recent Labs   Lab 04/25/19  0215   *   K 5.6*   CL 98   CO2 20*   BUN 79*    CREATININE 9.3*   *     LFT:   Lab Results   Component Value Date    AST 22 04/25/2019    ALT 14 04/25/2019     (H) 04/02/2019    ALKPHOS 511 (H) 04/25/2019    BILITOT 0.4 04/25/2019    ALBUMIN 2.8 (L) 04/25/2019    PROT 7.1 04/25/2019     CBC:   Lab Results   Component Value Date    WBC 8.71 04/25/2019    HGB 9.3 (L) 04/25/2019    HCT 28.6 (L) 04/25/2019    MCV 81 (L) 04/25/2019     04/25/2019     Microbiology x 7d:   Microbiology Results (last 7 days)     ** No results found for the last 168 hours. **        Imaging:    cxr clear     I personally reviewed the above image.    ASSESSMENT/PLAN:     Active Hospital Problems    Diagnosis    *Brain tumor    Nontraumatic subcortical hemorrhage of left cerebral hemisphere    Cytotoxic brain edema    Hypocalcemia    Brain compression     27 yo female with PMHx hyperparathyroidism, ESRD, HTN, epilepsy, liver transplant in 1991 who presents with complaints of headache, n/v, abdominal pain x1 day while at dialysis today.     - Neurologically stable   - No acute neurosurgical intervention necessary at this time.   - CT head with left temporal calvarium lesion with surrounding slight mass effect and 2mm midline shift. New lesion in comparison to prior CT head 10/2018. No evidence for acute hemorrhage.   - Remainder of care per primary.  - Recommend consult to Epilepsy if suspect seizure activity.   - Follow-up in NSGY clinic with Dr. Powell. Patient will be called with appointment.   - Discussed with Dr. Powell.             Thrombocytopenia    Seizures     No seizures in about 8 months. Episodes were likely provoked in the setting of acute illness. She should continue AED medications as long as they are well tolerated and causing no side effects until she is seizure free about one year, then at that time we can discuss weaning medications if she chooses.      Immunosuppressed    Renovascular hypertension    ESRD on hemodialysis    Liver replaced by  transplant     hemangioendothelioma s/p LTx (1992)              Plan:  RRT  Follow Na  Follow exam      Level;3

## 2019-04-25 NOTE — ASSESSMENT & PLAN NOTE
ESRD on iHD TTS  [facility]  Dr. Bass  3.5 hours  EDW ~ 50 kg  LFA AVF    Plan/Recommendations:  -HD today for metabolic clearance/volume management  -3 hour treatment.  2-2.5L as tolerated  -high calcium bath  -Calcium Gluconate 3 gm IVPB x 1 now  -continue strict I/O's  -daily renal panel with phos added

## 2019-04-26 NOTE — ASSESSMENT & PLAN NOTE
2/2 non-compliance with activated vit D and calcium supplementation s/p parathyroidectomy  -PTH elevated @ 407  -phos levels daily  -continue calcitriol 0.5 mcg po BID  -continue Calcium Carbonate 2 g TID   -calcium gluconate 3 mg IVPB x 1 now

## 2019-04-26 NOTE — PLAN OF CARE
04/26/19 1447   Post-Acute Status   Post-Acute Authorization Placement   Post-Acute Placement Status Patient List Provided   SW met with Pt and pt's mother at bedside. Discussed therapy recs for rehab and provided list. Family to review and give choices asap.    Katie Chin LMSW  [facility]

## 2019-04-26 NOTE — PLAN OF CARE
Problem: Adult Inpatient Plan of Care  Goal: Plan of Care Review  Outcome: Ongoing (interventions implemented as appropriate)  POC reviewed with pt and family at 1400. Pt's mother verbalized understanding. Questions and concerns addressed. No acute events today. Pt progressing toward goals. Will continue to monitor. See flowsheets for full assessment and VS info.       Goal: Absence of Hospital-Acquired Illness or Injury    Intervention: Prevent Skin Injury  Turned q2h  Intervention: Prevent Infection  Standard precautions maintained    Goal: Optimal Comfort and Wellbeing    Intervention: Monitor Pain and Promote Comfort  Repositioned       Problem: Pain (Anesthesia/Analgesia, Neuraxial)  Goal: Effective Pain Control  Pt free from s/s pain/discomfort

## 2019-04-26 NOTE — PLAN OF CARE
Problem: Occupational Therapy Goal  Goal: Occupational Therapy Goal  Goals to be met by: 5/10(2 weeks from initial evaluation)     Patient will increase functional independence with ADLs by performing:    Pt will follow 95% of simple step commands for functional tasks.   Pt will verbally ID 1/3 items for ADL task with added time.   UE Dressing with Set-up Assistance and Supervision.  LE Dressing (pants, brief) with Set-up Assistance and Minimal Assistance.  Grooming while standing with Set-up Assistance and Contact Guard Assistance.  Toileting from toilet with Minimal Assistance for hygiene and clothing management.   Toilet transfer to toilet with Minimal Assistance.  Functional mobility at short household distance for ADL task with Minimal Assistance.     Outcome: Ongoing (interventions implemented as appropriate)  OT eval completed. Rec Rehab at this time. Plan of care for 4x/w. ERNA Horta 4/26/2019

## 2019-04-26 NOTE — PROGRESS NOTES
[facility]  Neurosurgery  Progress Note    Subjective:       Post-Op Info:  Procedure(s) (LRB):  CRANIOTOMY-- left crani for clot evac with microscrope (Left)   4 Days Post-Op     Interval History: NAEON. HV pulled out yesterday. Exam improving with some receptive aphasia. She recived HA today and became restless with SBP up to 200s and had change in exam. Follow up scan with more punctuate ICH and perilesional edema.     Medications:  Continuous Infusions:   nicardipine 15 mg/hr (04/26/19 1800)    Sodium Chloride 2% 50 mL/hr at 04/26/19 1800     Scheduled Meds:   calcitriol  0.5 mcg Per NG tube BID    calcium carbonate  2,000 mg Per NG tube TID    calcium gluconate IVPB  1,000 mg Intravenous Once    carvedilol  25 mg Per NG tube BID    dexamethasone  4 mg Intravenous Q6H    famotidine  20 mg Per NG tube Daily    hydrALAZINE  50 mg Per NG tube Q8H    lacosamide  50 mg Per NG tube BID    levETIRAcetam  500 mg Per NG tube BID    mupirocin  1 g Nasal BID    polyethylene glycol  17 g Per NG tube Daily    [START ON 4/27/2019] senna-docusate 8.6-50 mg  2 tablet Per NG tube Daily    tacrolimus  3 mg Per NG tube Daily    tacrolimus  3 mg Per NG tube Daily     PRN Meds:dextrose 50%, glucagon (human recombinant), hydrALAZINE, insulin aspart U-100, labetalol, midazolam, ondansetron, oxyCODONE, sodium chloride 0.9%     Review of Systems     Unable to aphasia   Objective:     Weight: 54.8 kg (120 lb 13 oz)  Body mass index is 22.1 kg/m².  Vital Signs (Most Recent):  Temp: 99 °F (37.2 °C) (04/26/19 1505)  Pulse: 82 (04/26/19 1802)  Resp: 17 (04/26/19 1802)  BP: 137/83 (04/26/19 1802)  SpO2: 99 % (04/26/19 1802) Vital Signs (24h Range):  Temp:  [97.8 °F (36.6 °C)-99 °F (37.2 °C)] 99 °F (37.2 °C)  Pulse:  [70-86] 82  Resp:  [12-23] 17  SpO2:  [93 %-100 %] 99 %  BP: (116-162)/() 137/83  Arterial Line BP: (118-173)/(66-95) 153/85     Date 04/26/19 0700 - 04/27/19 0659   Shift 8755-8253  2192-9056 5003-2573 24 Hour Total   INTAKE   I.V.(mL/kg) 63.1(1.2) 131.1(2.4)  194.2(3.5)   NG/   160   IV Piggyback 100 100  200   Shift Total(mL/kg) 323.1(5.9) 231.1(4.2)  554.2(10.1)   OUTPUT   Shift Total(mL/kg)       Weight (kg) 54.8 54.8 54.8 54.8                        Hemodialysis AV Fistula Left forearm (Active)   Needle Size 15ga 4/25/2019  1:20 PM   Site Assessment Clean;Dry;Intact 4/25/2019  1:20 PM   Patency Present;Thrill;Bruit 4/25/2019  1:20 PM   Status Accessed 4/25/2019  1:20 PM   Flows Good 4/25/2019  3:05 AM   Dressing Intervention Dressing reinforced 4/25/2019  3:05 AM   Dressing Status Clean;Intact;Dry 4/25/2019 11:01 AM   Site Condition No complications 4/25/2019 11:01 AM   Dressing Occlusive 4/25/2019 11:01 AM   Drainage Description Other (Comment) 4/14/2018  5:26 PM       Neurosurgery Physical Exam  [id number]  PERRLA   Follow simple commands (wiggling toes) but not complex   Some receptive aphasia   Move L side and RLE AG, RUE paresis with minimal WD  Wound c/d/i        Significant Labs:  Recent Labs   Lab 04/26/19  0308  04/26/19  1126 04/26/19  1307 04/26/19  1638   *   < > 163* 151* 137*   *   < > 131* 130* 131*   K 5.3*   < > 5.7* 5.7* 5.6*   CL 97   < > 98 97 97   CO2 18*   < > 17* 21* 20*   BUN 66*   < > 73* 76* 77*   CREATININE 7.9*   < > 8.3* 8.3* 8.6*   CALCIUM 6.0*   < > 5.6* 5.8* 5.8*   MG 2.2  --   --   --   --     < > = values in this interval not displayed.     Recent Labs   Lab 04/26/19  0308 04/26/19  0807 04/26/19  0946 04/26/19  1638   WBC 7.57 8.79  --  6.10   HGB 10.4* 10.9*  --  10.0*   HCT 32.1* 33.3* 33* 30.5*   * 154  --  129*     Recent Labs   Lab 04/25/19  0215 04/25/19  0828 04/25/19  2119 04/26/19  0308 04/26/19  0807   INR 1.2  --   --  1.2  --    APTT  --  26.3 25.6  --  25.2     Microbiology Results (last 7 days)     ** No results found for the last 168 hours. **        All pertinent labs from the last 24 hours have been  reviewed.    Significant Diagnostics:  CT: Ct Head Without Contrast    Result Date: 4/25/2019  Evolving operative change from left temporal craniectomy and cranioplasty for left posterior temporal parenchymal hematoma evacuation. There is reduced postoperative pneumocephalus with small volumes of increased parenchymal hemorrhage within and about the resection cavity. Evolving mass effect and edema with continued 5-6 mm of rightward midline shift similar to prior. There is continued small volume intraventricular hemorrhage with slight increase distension of the lateral ventricles concerning for component of evolving hydrocephalus. 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 Follow up scan with more punctuate ICH and perilesional edema as well as IV after severe hypertensive episode.     POD#5           Continue ICU care           Follow up scan stable  Continue neurochecks q1h  HOB>30  Keppra 500mg BID  cEEG to r/o any seizure   Na 140-150  Continue Dex 4q6hrs for cerebral edema  HDS, keep SBP <140, now on Cardene   Sating well at RA  AF and WBC WNL and H/H stable  Daily PT/OT  DVTx: SCD/TCD  Further management of care per NCC  Will continue to monitor. Please page NSGY with any changes in exam      Kimberly Roy MD  Neurosurgery  [facility]

## 2019-04-26 NOTE — SUBJECTIVE & OBJECTIVE
Interval History:   3 hour HD treatment yesterday ended early by 1 hour 2/2 increased agitation that was preceded by hypertensive episode.  Repeat CT noted for increased cerebral edema.  Mental status improved overnight and stable today.  Mild hyperkalemia on AM labs.  Worsening hypocalcemia after replacement yesterday 2/2 HBS.    Review of patient's allergies indicates:   Allergen Reactions    Chloral hydrate Hallucinations     Other reaction(s): Hallucinations  Other reaction(s): Hives    Hydrocodone Other (See Comments)     Mental status changes     Current Facility-Administered Medications   Medication Frequency    0.9%  NaCl infusion Once    calcitriol solution 0.5 mcg BID    calcium carbonate 500 mg/5 mL (1,250 mg/5 mL) suspension 2,000 mg TID    carvedilol tablet 25 mg BID    dexamethasone injection 4 mg Q6H    dextrose 50% injection 12.5 g PRN    famotidine tablet 20 mg Daily    glucagon (human recombinant) injection 1 mg PRN    hydrALAZINE injection 10 mg Q4H PRN    hydrALAZINE tablet 50 mg Q8H    insulin aspart U-100 pen 1-10 Units Q6H PRN    labetalol 20 mg/4 mL (5 mg/mL) IV syring Q4H PRN    lacosamide tablet 50 mg BID    levETIRAcetam tablet 500 mg BID    midazolam (VERSED) 1 mg/mL injection 2 mg On Call Procedure    mupirocin 2 % ointment 1 g BID    niCARdipine 40 mg/200 mL infusion Continuous    ondansetron disintegrating tablet 4 mg Q6H PRN    oxyCODONE immediate release tablet 5 mg Q6H PRN    polyethylene glycol packet 17 g Daily    senna-docusate 8.6-50 mg per tablet 2 tablet Daily    sodium chloride 0.9% flush 10 mL PRN    sodium polystyrene powder 30 g Q4H    tacrolimus (PROGRAF) 1 mg/mL oral syringe Daily    tacrolimus (PROGRAF) 1 mg/mL oral syringe Daily       Objective:     Vital Signs (Most Recent):  Temp: 98 °F (36.7 °C) (04/26/19 1105)  Pulse: 71 (04/26/19 1105)  Resp: 15 (04/26/19 1105)  BP: 131/73 (04/26/19 1105)  SpO2: 99 % (04/26/19 1105)  O2 Device (Oxygen  Therapy): nasal cannula (04/26/19 1105) Vital Signs (24h Range):  Temp:  [97.4 °F (36.3 °C)-98 °F (36.7 °C)] 98 °F (36.7 °C)  Pulse:  [70-94] 71  Resp:  [9-40] 15  SpO2:  [93 %-100 %] 99 %  BP: (116-179)/(58-97) 131/73  Arterial Line BP: (118-192)/(66-94) 134/75     Weight: 54.8 kg (120 lb 13 oz) (04/22/19 1100)  Body mass index is 22.1 kg/m².  Body surface area is 1.55 meters squared.    I/O last 3 completed shifts:  In: 1456.4 [P.O.:380; I.V.:976.4; IV Piggyback:100]  Out: 0     Physical Exam   Constitutional: She appears well-developed. She appears lethargic. No distress.   HENT:   Right Ear: External ear normal.   Left Ear: External ear normal.   Facial edema   Eyes: Conjunctivae and EOM are normal. Right eye exhibits no discharge. Left eye exhibits no discharge.   Neck: Normal range of motion. Neck supple.   Cardiovascular: Normal rate and regular rhythm. Exam reveals no gallop and no friction rub.   No murmur heard.  Pulmonary/Chest: Effort normal and breath sounds normal. No respiratory distress. She has no wheezes. She has no rales.   Abdominal: Soft. Bowel sounds are normal. She exhibits no distension. There is no tenderness.   Musculoskeletal: Normal range of motion. She exhibits no edema or deformity.   Neurological: She appears lethargic.   Opens eyes to voice/follow commands   Skin: Skin is warm and dry. She is not diaphoretic.   Psychiatric: She has a normal mood and affect. Her behavior is normal.       Significant Labs:  CBC:   Recent Labs   Lab 04/26/19  0807 04/26/19  0946   WBC 8.79  --    RBC 4.18  --    HGB 10.9*  --    HCT 33.3* 33*     --    MCV 80*  --    MCH 26.1*  --    MCHC 32.7  --      CMP:   Recent Labs   Lab 04/26/19  0308  04/26/19  1126   *   < > 163*   CALCIUM 6.0*   < > 5.6*   ALBUMIN 2.8*  --   --    PROT 7.3  --   --    *   < > 131*   K 5.3*   < > 5.7*   CO2 18*   < > 17*   CL 97   < > 98   BUN 66*   < > 73*   CREATININE 7.9*   < > 8.3*   ALKPHOS 530*  --   --     ALT 7*  --   --    AST 24  --   --    BILITOT 0.5  --   --     < > = values in this interval not displayed.

## 2019-04-26 NOTE — PLAN OF CARE
Problem: SLP Goal  Goal: SLP Goal  Goals to be met 5/2    1. Pt will tolerate diet of thin liquids and dental soft solids without overt clinical signs of aspiration   2. Pt will participate in trials of regular solids within speech therapy sessions to help determine least restrictive diet   3. Pt will demonstrate orientation to place, situation , family members, date w/ min cues   4. Pt will complete category inclusions task w/ 80% acc w/ min cues   5. Pt will generate 3 items per category w/ min cues to enhance organizations skills   6. Pt will label items w/ 80%acc w/ mincues to enhance verbal expression skills   7. Pt will participate in ongoing assessment of speech language and cognitive linguistic skills to help rule out deficits and determine therapeutic plan of care       Pt with good progressed towards goals     Josselyn Medeiros MS, CCC-SLP  Speech Language Pathologist  Pager: [phone]  Date   4/26/2019

## 2019-04-26 NOTE — PLAN OF CARE
Problem: Adult Inpatient Plan of Care  Goal: Plan of Care Review  Outcome: Ongoing (interventions implemented as appropriate)  POC reviewed with pt at 0500. Pt did not verbalize understanding. POC reviewed with pt's sister via telephone around 0600. Questions and concerns addressed. CT head complete. Pt is more alert than yesterday afternoon, still aphasic but occasionally responding appropriately. Cardene gtt. EEG was taken off before CT, NCC aware. No acute events overnight. Pt progressing toward goals. Will continue to monitor. See flowsheets for full assessment and VS info

## 2019-04-26 NOTE — ASSESSMENT & PLAN NOTE
ESRD on iHD TTS  [facility]  Dr. Bass  3.5 hours  EDW ~ 50 kg  LFA AVF    Plan/Recommendations:  -will hold RRT today  -will likely need tomorrow, will consider performing low flow HD with low flux dialyzer for minimal osmolar shifts  -Calcium Gluconate 3 gm IVPB x 1 now  -continue strict I/O's  -daily renal panel with phos added

## 2019-04-26 NOTE — PT/OT/SLP EVAL
Speech Language Pathology Evaluation  Cognitive Communication    Patient Name:  Holly Patel   MRN:  [id number]  Admitting Diagnosis: Brown tumor    Recommendations:     Recommendations:                General Recommendations:  Dysphagia therapy, Speech/language therapy and Cognitive-linguistic therapy  Diet recommendations:  Dental Soft, Thin   Aspiration Precautions: Eliminate distractions, Feed only when awake/alert, HOB to 90 degrees and Standard aspiration precautions   General Precautions: Standard, aspiration, fall  Communication strategies:  provide increased time to answer    History:     Past Medical History:   Diagnosis Date    Anemia in ESRD (end-stage renal disease) 10/12/2015    dialysis tues, thursday, sat; access left arm    Chronic rejection of liver transplant 3/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 ()    Moderate protein-calorie malnutrition 2017    MRSA bacteremia 2017    Pneumonia     Prophylactic immunotherapy 2014    Renovascular hypertension 10/2/2015    Secondary hyperparathyroidism 2017    Seizures     Sialadenitis 3/21/2018    Thrombocytopenia 2016       Past Surgical History:   Procedure Laterality Date    BIOPSY, LIVER, TRANSJUGULAR APPROACH N/A 2018    Performed by [facility] at [facility] OR 2ND FLR    BIOPSY-LIVER N/A 2017    Performed by [facility] OR 2ND FLR    BIOPSY-LIVER N/A 3/22/2016    Performed by [facility] at [facility] OR 2ND FLR     SECTION      x 2     "CONIZATION-CERVICAL-LEEP N/A 6/15/2018    Performed by [facility]roquin [facility] at [facility] OR    MTRBOOTHONMI-FEQOBNY-RM; upper extremity Left 7/17/2015    Performed by [facility] Diaz [facility] at [facility] OR 2ND FLR    CRANIOPLASTY  4/22/2019    Performed by Jose Luis Powell MD at [facility] OR 2ND FLR    CRANIOTOMY-- left crani for clot evac with microscrope Left 4/22/2019    Performed by [facility] at [facility] OR 2ND FLR    EMBOLIZATION, BLOOD VESSEL N/A 7/21/2018    Performed by Aren Ramos MD at [facility] CATH LAB    Exam Under Anesthesia N/A 8/8/2018    Performed by [facility] at [facility] OR 2ND FLR    Exam under anesthesia N/A 6/19/2018    Performed by [facility]quin [facility] at [facility] OR    Exam under anesthesia (ADD ON ) N/A 7/9/2018    Performed by NICKIE Alvarez MD at [facility] OR    Exam under anesthesia -cervical suturing  N/A 7/26/2018    Performed by Lei Sims III, MD at [facility] OR    EXCISION, NEOPLASM, SKULL with Cranioplasty Left 4/22/2019    Performed by [facility] at [facility] OR 2ND FLR    FISTULOGRAM Left 12/4/2015    Performed by [facility]alia Diaz [facility] at [facility] CATH LAB    LIVER BIOPSY      LIVER TRANSPLANT  09/1992    PARATHYROIDECTOMY, Minimally Invasive Bilateral Exploration Bilateral 3/27/2019    Performed by Ashley Guallpa MD at [facility] OR 2ND FLR    SUTURE REPAIR,CERVIX  6/19/2018    Performed by [facility] at [facility] OR    TUBAL LIGATION  2010       Please refer to initial assessment for complete background information       Subjective     "can you cover my legs"    Pt asleep upon arrival; easily woke with tactile and auditory stimuli    Pain/Comfort:  · Pain Rating 1: 0/10  · Pain Rating Post-Intervention 1: 0/10    Objective:   Cognitive Status:    Arousal/Alertness Delayed response to stimuli    Attention No obvious deficits observed    Perseveration Present-verbal    Orientation Person  Memory unable to assess  Problem Solving unable to assess   Oriented to " "self only        Receptive Language:   Comprehension:   Questions Open ended questions incosnsitent responses; perseveartive in nature  ongoing assessment warranted   Commands  One step with a model for general sefl care 100% acc; ongoing assessment for multi step commands     Pragmatics:    flat    Expressive Language:  Verbal:    Automatic Speech  Months of the year 75% acc with SLP initiation; difficulty transitioning to new tasks with multiple perseverative responses appreciated   Naming Single word responsive naming semantic paraphasias evident and perseverative responses appreciated   With personal requests speech was fluent and appropriate "I want more juice" "cover my legs" "yes thank you" however inconsistent in nature with verbal responses during structured tasks        Motor Speech:  WFL - speech was fluent and 100% intelligible   Hyponasal resonance appreciated with verbal responses yet mild in nature     Voice:   WFL    Visual-Spatial:  not yet assessed; during ADLs observed with OT no overt inattention appreciated and pt appearing to attend to stimuli bilaterally appropriately     Reading:   not yet assessed      Written Expression:   not yet assessed     Treatment: Per discussion with RN pt with neuro change last evening with most recent CT scan revealing extended bleed and midline shift. Pt subsequently made NPO with NG tube placed. Pt this date demonstrated adequate ability to consume 8+oz of thin liquids via cyclic straw sips. Pt also managed regular solid x2 with complete mastication and timely AP transfer. Given concern for fluctuating neuro status and lethargy recommending thin liquids and dental soft solids. Whiteboard current and speech to continue to follow.     Assessment:   Holly Patel is a 28 y.o. female with an SLP diagnosis of Aphasia, Dysphagia and Cognitive-Linguistic Impairment.      Goals:   Multidisciplinary Problems     SLP Goals        Problem: SLP Goal    Goal Priority " Disciplines Outcome   SLP Goal     SLP    Description:  Goals to be met 5/2    1. Pt will tolerate diet of thin liquids and dental soft solids without overt clinical signs of aspiration   2. Pt will participate in trials of regular solids within speech therapy sessions to help determine least restrictive diet   3. Pt will demonstrate orientation to place, situation , family members, date w/ min cues   4. Pt will complete category inclusions task w/ 80% acc w/ min cues   5. Pt will generate 3 items per category w/ min cues to enhance organizations skills   6. Pt will label items w/ 80%acc w/ mincues to enhance verbal expression skills   7. Pt will participate in ongoing assessment of speech language and cognitive linguistic skills to help rule out deficits and determine therapeutic plan of care                       Plan:   · Patient to be seen:  4 x/week   · Plan of Care expires:     · Plan of Care reviewed with:  patient   · SLP Follow-Up:  Yes       Discharge recommendations:  Discharge Facility/Level of Care Needs: rehabilitation facility   Barriers to Discharge:  Level of Skilled Assistance Needed      Time Tracking:   SLP Treatment Date:   04/26/19  Speech Start Time:  0900  Speech Stop Time:  0918     Speech Total Time (min):  18 min    Billable Minutes: Eval 9  and Treatment Swallowing Dysfunction 9    Josselyn Medeiros CCC-SLP  04/26/2019

## 2019-04-26 NOTE — PT/OT/SLP EVAL
Occupational Therapy   Evaluation    Name: Holly Patel  MRN: [id number]  Admitting Diagnosis:  Brown tumor ; Nontraumatic subcortical hemorrhage of left cerebral hemisphere  CT- Evolving postoperative change of prior left temporal craniectomy and mesh cranioplasty with relatively stable scattered parenchymal left temporal lobe hemorrhage with surrounding edema.  There is continued localized mass effect with approximately 0.5 cm of rightward midline shift, similar to prior examination.      4 Days Post-Op CRANIOTOMY-- left crani for clot evac with microscrope (Left)   2 Days post op extubation     Recommendations:     Discharge Recommendations: rehabilitation facility  Discharge Equipment Recommendations:  none  Barriers to discharge:  Other (Comment)(safety risk; level of skilled assistance required; remains in ICU)    Assessment:     Holly Patel is a 28 y.o. female with a medical diagnosis of Brown tumor.  She presents with aphasia and a decline in functional endurance for functional tasks/activities. Moreover, pt with neuro change 4/25/2019 and evaluation limited to EOB/standing EOB on this date for best overall safety. She demo good motivation and active participation within evaluation. She would best benefit from rehab at post acute level of care pending further progression in care. Performance deficits affecting function: impaired endurance, impaired self care skills, impaired functional mobilty, gait instability, impaired balance, impaired cognition, impaired cardiopulmonary response to activity, other (comment)(aphasia).      Rehab Prognosis: Good; patient would benefit from acute skilled OT services to address these deficits and reach maximum level of function.       Plan:     Patient to be seen 4 x/week to address the above listed problems via self-care/home management, therapeutic activities, therapeutic exercises, neuromuscular re-education, cognitive retraining  · Plan of Care Expires:  "05/25/19  · Plan of Care Reviewed with: patient    Subjective     Chief Complaint: no reports  Patient/Family Comments/goals: "juice"    Occupational Profile: Pt with aphasia; no family at bedside; info per chart review  Living Environment: Pt lives with mother in 1SH with 0STE; bathroom contains tub-shower combo with no DME.  Previous level of function: as of 8/2018: Indep with ADL and functional mobility/using no DME. No longer driving. Pt is an ESRD patient and goes to HD 3x/w.   Roles and Routines: daughter, sister, friend, care taker to self, home and community dweller  Equipment Used at Home:  none  Assistance upon Discharge: yes, mother    Pain/Comfort:  · Pain Rating 1: 0/10  · Pain Rating Post-Intervention 1: 0/10    Patients cultural, spiritual, Scientologist conflicts given the current situation: no    Objective:     Communicated with: RN Harpreet) prior to session.  Patient found HOB elevated with telemetry, NG tube, arterial line, oxygen, restraints, pulse ox (continuous), blood pressure cuff, peripheral IV upon OT entry to room.    General Precautions: Standard, aphasia, aspiration, fall, seizure   Orthopedic Precautions:N/A   Braces: N/A     Occupational Performance:  Bed Mobility:    · Patient completed Rolling/Turning to Right with contact guard assistance and with side rail  · Patient completed Scooting/Bridging with contact guard assistance  · Patient completed Supine to Sit with minimum assistance  · Patient completed Sit to Supine with minimum assistance    Functional Mobility/Transfers:  · Patient completed Sit <> Stand Transfer with minimum assistance  with  no assistive device   · Functional Mobility: 4 side steps to R at EOB with min(A) and B hand held assistance ; reported mild dizziness with activity     Activities of Daily Living:  · Feeding:  contact guard assistance and set up; EOB to self feed with R hand- from SLP  · Grooming: minimum assistance EOB with set up; to wash face  · Upper Body " Dressing: minimum assistance EOB    Cognitive/Visual Perceptual:  Cognitive/Psychosocial Skills:     -       Oriented to: Person   -       Follows Commands/attention:Inconsistent   -       Communication: aphasia  -       Memory: Impaired  -       Safety awareness/insight to disability: impaired   -       Mood/Affect/Coping skills/emotional control: Cooperative  Visual/Perceptual:      -Intact  tracking functionally    Physical Exam:  Postural examination/scapula alignment:    -       Rounded shoulders  Skin integrity: Dry  Edema:  Mild facial; abdominal distension noted  Sensation:    -       Intact  B UE  Motor Planning:    -       Intact B UE  Dominant hand:    -       R  Upper Extremity Range of Motion:     -       Right Upper Extremity: WFL  -       Left Upper Extremity: WFL  Upper Extremity Strength:    -       Right Upper Extremity: WFL  -       Left Upper Extremity: WFL   Strength:    -       Right Upper Extremity: WFL  -       Left Upper Extremity: WFL    AMPAC 6 Click ADL:  AMPAC Total Score: 18   Body mass index is 22.1 kg/m².  Vitals:    04/26/19 0948   BP:    Pulse: 70   Resp: 17   Temp:        Treatment & Education:  -Pt alert and agreeable to therapy session; eyes open 100% of session  -EOB static sitting SBA; dynamic with CGA  -static standing with min(A) and B UE support  -Communication board updated; questions/concerns addressed within OT scope of practice  -no family at bedside for education   Education:    Patient left with bed in chair position with all lines intact, call button in reach, bed alarm on, restraints reapplied at end of session and RN notified    GOALS:   Multidisciplinary Problems     Occupational Therapy Goals        Problem: Occupational Therapy Goal    Goal Priority Disciplines Outcome Interventions   Occupational Therapy Goal     OT, PT/OT Ongoing (interventions implemented as appropriate)    Description:  Goals to be met by: 5/10(2 weeks from initial evaluation)     Patient  will increase functional independence with ADLs by performing:    Pt will follow 95% of simple step commands for functional tasks.   Pt will verbally ID 1/3 items for ADL task with added time.   UE Dressing with Set-up Assistance and Supervision.  LE Dressing (pants, brief) with Set-up Assistance and Minimal Assistance.  Grooming while standing with Set-up Assistance and Contact Guard Assistance.  Toileting from toilet with Minimal Assistance for hygiene and clothing management.   Toilet transfer to toilet with Minimal Assistance.  Functional mobility at short household distance for ADL task with Minimal Assistance.                       History:     Past Medical History:   Diagnosis Date    Anemia in ESRD (end-stage renal disease) 10/12/2015    dialysis tues, thursday, sat; access left arm    Chronic rejection of liver transplant 3/22/2016    Depression     Encounter for blood transfusion     ESRD on hemodialysis 9/30/2015    History of recent hospitalization 05/2018    pneumonia    History of splenomegaly 4/12/2016    Immunosuppressed 8/5/2017    Iron deficiency anemia secondary to inadequate dietary iron intake 8/16/2017    She [facility] iron [facility].   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)    Moderate protein-calorie malnutrition 8/16/2017    MRSA bacteremia 8/6/2017    Pneumonia     Prophylactic immunotherapy 8/4/2014    Renovascular hypertension 10/2/2015    Secondary hyperparathyroidism 8/5/2017    Seizures     Sialadenitis 3/21/2018    Thrombocytopenia 4/12/2016       Past Surgical History:   Procedure Laterality Date    BIOPSY, LIVER, TRANSJUGULAR APPROACH N/A 11/16/2018    [facility]    BIOPSY-LIVER N/A 6/16/2017    [facility]    BIOPSY-LIVER N/A 3/22/2016    Performed by [facility] OR [address] [place] [address]      SECTION      x 2    CONIZATION-CERVICAL-LEEP N/A 6/15/2018    [facility]    [id number]-TN; upper extremity Left 2015    [facility]    CRANIOPLASTY  2019    [facility]    CRANIOTOMY-- left crani for clot evac with microscrope Left 2019    [facility]    EMBOLIZATION, BLOOD VESSEL N/A 2018    Performed by [facility] at [facility] CATH LAB    Exam Under Anesthesia N/A 2018    [facility]    Exam under anesthesia N/A 2018    [facility] OR    Exam under anesthesia (ADD ON ) N/A 2018    Performed by NICKIE Alvarez [facility] at [facility] OR    Exam under anesthesia -cervical suturing  N/A 2018    Performed by Lei Sims III, [facility] at [facility] OR    EXCISION, NEOPLASM, SKULL with Cranioplasty Left 2019    [facility]    FISTULOGRAM Left 2015    Performed by [facility] at [facility] CATH LAB    LIVER BIOPSY      LIVER TRANSPLANT  1992    PARATHYROIDECTOMY, Minimally Invasive Bilateral Exploration Bilateral 3/27/2019    Performed by [facility]allpa [facility] at [facility]    SUTURE REPAIR,CERVIX  2018    Performed by [facility] at [facility] OR    TUBAL LIGATION         Time Tracking:     OT Date of Treatment: 19  OT Start Time: 00  OT Stop Time: 920  OT Total Time (min): 20 min    Billable Minutes:Evaluation 20    RENA Horta  2019

## 2019-04-26 NOTE — PROGRESS NOTES
ICU Progress Note  Neurocritical Care    Admit Date: 4/22/2019  LOS: 4    CC: Brown tumor    Code Status: Full Code     SUBJECTIVE:     Interval History/Significant Events:       Decrease in MS during RRT yesterday  RRT stopped   Stat Ct performed  Brain compression  Vasogenic cerebral edema  Hyponatremia  Hyperkalemia  ESRD      Medications:  Continuous Infusions:   nicardipine 10 mg/hr (04/26/19 0800)     Scheduled Meds:   sodium chloride 0.9%   Intravenous Once    calcitriol  0.5 mcg Per NG tube BID    calcium carbonate  2,000 mg Per NG tube TID    calcium gluconate IVPB  3,000 mg Intravenous Once    carvedilol  25 mg Per NG tube BID    dexamethasone  4 mg Intravenous Q6H    famotidine  20 mg Per NG tube Daily    hydrALAZINE  50 mg Per NG tube Q8H    lacosamide  50 mg Per NG tube BID    levETIRAcetam  500 mg Per NG tube BID    mupirocin  1 g Nasal BID    polyethylene glycol  17 g Per NG tube Daily    senna-docusate 8.6-50 mg  2 tablet Oral Daily    tacrolimus  3 mg Per NG tube Daily    tacrolimus  3 mg Per NG tube Daily     PRN Meds:.dextrose 50%, glucagon (human recombinant), hydrALAZINE, insulin aspart U-100, labetalol, midazolam, ondansetron, oxyCODONE, sodium chloride 0.9%    OBJECTIVE:   Vital Signs (Most Recent):   Temp: 97.9 °F (36.6 °C) (04/26/19 0705)  Pulse: 78 (04/26/19 0806)  Resp: 20 (04/26/19 0806)  BP: 136/74 (04/26/19 0806)  SpO2: 98 % (04/26/19 0806)    Vital Signs (24h Range):   Temp:  [97.4 °F (36.3 °C)-98 °F (36.7 °C)] 97.9 °F (36.6 °C)  Pulse:  [72-94] 78  Resp:  [9-40] 20  SpO2:  [93 %-100 %] 98 %  BP: (116-179)/(58-97) 136/74  Arterial Line BP: (120-192)/(66-94) 137/78    ICP/CPP (Last 24h):        I & O (Last 24h):     Intake/Output Summary (Last 24 hours) at 4/26/2019 0934  Last data filed at 4/26/2019 0800  Gross per 24 hour   Intake 1377.05 ml   Output --   Net 1377.05 ml     Physical Exam:  GA: Alert, comfortable, no acute distress.   HEENT: No scleral icterus or JVD.    Pulmonary: Clear to auscultation A/P/L. No wheezing, crackles, or rhonchi.  Cardiac: RRR S1 & S2 w/o rubs/murmurs/gallops.   Abdominal: Bowel sounds present x 4. No appreciable hepatosplenomegaly.  Skin: No jaundice, rashes, or visible lesions.  Neuro:      Pupils 3  Mm reactive  Awake x1  Non-focal  Improved since yesterday         Lines/Drains/Airway:      a3            Arterial Line 04/22/19 1659 Right Radial (Active)   Site Assessment Clean;Dry;Intact;No redness;No swelling 4/26/2019  7:05 AM   Line Status Pulsatile blood flow 4/26/2019  7:05 AM   Art Line Waveform Appropriate;Square wave test performed 4/26/2019  7:05 AM   Arterial Line Interventions Zeroed and calibrated;Leveled;Connections checked and tightened;Flushed per protocol 4/26/2019  7:05 AM   Color/Movement/Sensation Capillary refill less than 3 sec 4/26/2019  7:05 AM   Dressing Type Transparent 4/26/2019  7:05 AM   Dressing Status Biopatch in place;Clean;Dry;Intact 4/26/2019  7:05 AM   Dressing Intervention Dressing reinforced 4/26/2019  3:03 AM   Dressing Change Due 04/29/19 4/26/2019  7:05 AM           NG/OG Tube 04/25/19 2300 (Active)   Placement Check placement verified by distal tube length measurement;placement verified by aspirate characteristics 4/26/2019  7:05 AM   Tolerance no signs/symptoms of discomfort 4/26/2019  7:05 AM   Securement secured to nostril center w/ adhesive device 4/26/2019  7:05 AM   Clamp Status/Tolerance clamped;no abdominal discomfort;no abdominal distention;no emesis;no nausea;no residual;no restlessness 4/26/2019  7:05 AM   Insertion Site Appearance no redness, warmth, tenderness, skin breakdown, drainage 4/26/2019  7:05 AM   Drainage None 4/26/2019  7:05 AM   Flush/Irrigation flushed w/;water;no resistance met 4/26/2019  7:05 AM   Feeding Action feeding held 4/26/2019  7:05 AM   Current Rate (mL/hr) 0 mL/hr 4/26/2019  7:05 AM   Goal Rate (mL/hr) 0 mL/hr 4/26/2019  7:05 AM   Intake (mL) 100 mL 4/26/2019  8:00 AM    Residual Amount (ml) 0 ml 4/26/2019  7:05 AM            Hemodialysis AV Fistula Left forearm (Active)   Needle Size 15ga 4/25/2019  1:20 PM   Site Assessment Clean;Dry 4/26/2019  7:05 AM   Patency Thrill;Present;Bruit 4/26/2019  7:05 AM   Status Deaccessed 4/26/2019  7:05 AM   Flows Good 4/25/2019  3:05 AM   Dressing Intervention Dressing reinforced 4/26/2019  3:03 AM   Dressing Status Clean;Dry;Intact 4/26/2019  3:03 AM   Site Condition No complications 4/26/2019  7:05 AM   Dressing Gauze 4/26/2019  3:03 AM   Drainage Description Other (Comment) 4/14/2018  5:26 PM     Nutrition/Tube Feeds (if NPO state why): t feeds    Labs:  ABG:   Recent Labs   Lab 04/25/19  1536   PH 7.403   PO2 93   PCO2 36.2   HCO3 22.6*   POCSATURATED 97   BE -2     BMP:  Recent Labs   Lab 04/26/19  0308 04/26/19  0710   * 133*   K 5.3* 5.2*   CL 97 99   CO2 18* 20*   BUN 66* 69*   CREATININE 7.9* 8.1*   * 130*   MG 2.2  --    PHOS 4.6*  --      LFT:   Lab Results   Component Value Date    AST 24 04/26/2019    ALT 7 (L) 04/26/2019     (H) 04/02/2019    ALKPHOS 530 (H) 04/26/2019    BILITOT 0.5 04/26/2019    ALBUMIN 2.8 (L) 04/26/2019    PROT 7.3 04/26/2019     CBC:   Lab Results   Component Value Date    WBC 8.79 04/26/2019    HGB 10.9 (L) 04/26/2019    HCT 33.3 (L) 04/26/2019    MCV 80 (L) 04/26/2019     04/26/2019     Microbiology x 7d:   Microbiology Results (last 7 days)     ** No results found for the last 168 hours. **        Imaging:   CT- Evolving postoperative change of prior left temporal craniectomy and mesh cranioplasty with relatively stable scattered parenchymal left temporal lobe hemorrhage with surrounding edema.  There is continued localized mass effect with approximately 0.5 cm of rightward midline shift, similar to prior examination.    Stable small volume of intraventricular hemorrhage with relatively stable size and configuration of the ventricular system.  Stable effacement of the cerebral  sulci at the vertex may relate to underlying generalized cerebral edema  I personally reviewed the above image.    ASSESSMENT/PLAN:     Active Hospital Problems    Diagnosis    *Brown tumor    Brain tumor    Nontraumatic subcortical hemorrhage of left cerebral hemisphere    Cytotoxic brain edema    Brain compression     29 yo female with PMHx hyperparathyroidism, ESRD, HTN, epilepsy, liver transplant in 1991 who presents with complaints of headache, n/v, abdominal pain x1 day while at dialysis today.     - Neurologically stable   - No acute neurosurgical intervention necessary at this time.   - CT head with left temporal calvarium lesion with surrounding slight mass effect and 2mm midline shift. New lesion in comparison to prior CT head 10/2018. No evidence for acute hemorrhage.   - Remainder of care per primary.  - Recommend consult to Epilepsy if suspect seizure activity.   - Follow-up in NSGY clinic with Dr. Powell. Patient will be called with appointment.   - Discussed with Dr. Powell.             Thrombocytopenia    Seizures     No seizures in about 8 months. Episodes were likely provoked in the setting of acute illness. She should continue AED medications as long as they are well tolerated and causing no side effects until she is seizure free about one year, then at that time we can discuss weaning medications if she chooses.      Immunosuppressed    Renovascular hypertension    ESRD on hemodialysis    Liver replaced by transplant     hemangioendothelioma s/p LTx (1992)              Plan:  Follow Na and K q 6 hrs  Follow exam  Follow Ica  Calcium gluconate    Critical secondary to Patient has a condition that poses threat to life and bodily function: very concerned about hyperkalemia/hyponatremia/follow closely at high risk of deterioration    Cc time 30 mins     Critical care was time spent personally by me on the following activities: development of treatment plan with patient or surrogate and bedside  caregivers, discussions with consultants, evaluation of patient's response to treatment, examination of patient, ordering and performing treatments and interventions, ordering and review of laboratory studies, ordering and review of radiographic studies, pulse oximetry, re-evaluation of patient's condition. This critical care time did not overlap with that of any other provider or involve time for any procedures.

## 2019-04-26 NOTE — SUBJECTIVE & OBJECTIVE
Interval History: NAEON. HV pulled out yesterday. Exam improving with some receptive aphasia. She recived HA today and became restless with SBP up to 200s and had change in exam. Follow up scan with more punctuate ICH and perilesional edema.     Medications:  Continuous Infusions:   nicardipine 15 mg/hr (04/26/19 1800)    Sodium Chloride 2% 50 mL/hr at 04/26/19 1800     Scheduled Meds:   calcitriol  0.5 mcg Per NG tube BID    calcium carbonate  2,000 mg Per NG tube TID    calcium gluconate IVPB  1,000 mg Intravenous Once    carvedilol  25 mg Per NG tube BID    dexamethasone  4 mg Intravenous Q6H    famotidine  20 mg Per NG tube Daily    hydrALAZINE  50 mg Per NG tube Q8H    lacosamide  50 mg Per NG tube BID    levETIRAcetam  500 mg Per NG tube BID    mupirocin  1 g Nasal BID    polyethylene glycol  17 g Per NG tube Daily    [START ON 4/27/2019] senna-docusate 8.6-50 mg  2 tablet Per NG tube Daily    tacrolimus  3 mg Per NG tube Daily    tacrolimus  3 mg Per NG tube Daily     PRN Meds:dextrose 50%, glucagon (human recombinant), hydrALAZINE, insulin aspart U-100, labetalol, midazolam, ondansetron, oxyCODONE, sodium chloride 0.9%     Review of Systems     Unable to aphasia   Objective:     Weight: 54.8 kg (120 lb 13 oz)  Body mass index is 22.1 kg/m².  Vital Signs (Most Recent):  Temp: 99 °F (37.2 °C) (04/26/19 1505)  Pulse: 82 (04/26/19 1802)  Resp: 17 (04/26/19 1802)  BP: 137/83 (04/26/19 1802)  SpO2: 99 % (04/26/19 1802) Vital Signs (24h Range):  Temp:  [97.8 °F (36.6 °C)-99 °F (37.2 °C)] 99 °F (37.2 °C)  Pulse:  [70-86] 82  Resp:  [12-23] 17  SpO2:  [93 %-100 %] 99 %  BP: (116-162)/() 137/83  Arterial Line BP: (118-173)/(66-95) 153/85     Date 04/26/19 0700 - 04/27/19 0659   Shift 5955-0744 3766-4733 1970-6690 24 Hour Total   INTAKE   I.V.(mL/kg) 63.1(1.2) 131.1(2.4)  194.2(3.5)   NG/   160   IV Piggyback 100 100  200   Shift Total(mL/kg) 323.1(5.9) 231.1(4.2)  554.2(10.1)   OUTPUT    Shift Total(mL/kg)       Weight (kg) 54.8 54.8 54.8 54.8                        Hemodialysis AV Fistula Left forearm (Active)   Needle Size 15ga 4/25/2019  1:20 PM   Site Assessment Clean;Dry;Intact 4/25/2019  1:20 PM   Patency Present;Thrill;Bruit 4/25/2019  1:20 PM   Status Accessed 4/25/2019  1:20 PM   Flows Good 4/25/2019  3:05 AM   Dressing Intervention Dressing reinforced 4/25/2019  3:05 AM   Dressing Status Clean;Intact;Dry 4/25/2019 11:01 AM   Site Condition No complications 4/25/2019 11:01 AM   Dressing Occlusive 4/25/2019 11:01 AM   Drainage Description Other (Comment) 4/14/2018  5:26 PM       Neurosurgery Physical Exam  [id number]  PERRLA   Follow simple commands (wiggling toes) but not complex   Some receptive aphasia   Move L side and RLE AG, RUE paresis with minimal WD  Wound c/d/i        Significant Labs:  Recent Labs   Lab 04/26/19  0308  04/26/19  1126 04/26/19  1307 04/26/19  1638   *   < > 163* 151* 137*   *   < > 131* 130* 131*   K 5.3*   < > 5.7* 5.7* 5.6*   CL 97   < > 98 97 97   CO2 18*   < > 17* 21* 20*   BUN 66*   < > 73* 76* 77*   CREATININE 7.9*   < > 8.3* 8.3* 8.6*   CALCIUM 6.0*   < > 5.6* 5.8* 5.8*   MG 2.2  --   --   --   --     < > = values in this interval not displayed.     Recent Labs   Lab 04/26/19  0308 04/26/19  0807 04/26/19  0946 04/26/19  1638   WBC 7.57 8.79  --  6.10   HGB 10.4* 10.9*  --  10.0*   HCT 32.1* 33.3* 33* 30.5*   * 154  --  129*     Recent Labs   Lab 04/25/19  0215 04/25/19  0828 04/25/19  2119 04/26/19  0308 04/26/19  0807   INR 1.2  --   --  1.2  --    APTT  --  26.3 25.6  --  25.2     Microbiology Results (last 7 days)     ** No results found for the last 168 hours. **        All pertinent labs from the last 24 hours have been reviewed.    Significant Diagnostics:  CT: Ct Head Without Contrast    Result Date: 4/25/2019  Evolving operative change from left temporal craniectomy and cranioplasty for left posterior temporal parenchymal hematoma  evacuation. There is reduced postoperative pneumocephalus with small volumes of increased parenchymal hemorrhage within and about the resection cavity. Evolving mass effect and edema with continued 5-6 mm of rightward midline shift similar to prior. There is continued small volume intraventricular hemorrhage with slight increase distension of the lateral ventricles concerning for component of evolving hydrocephalus. Continued diffuse effacement cerebral sulci at the vertex concerning for sequela of cerebral edema or evolving hydrocephalus Electronically signed by: Reggei Espinoza DO Date:    04/25/2019 Time:    15:50

## 2019-04-26 NOTE — ASSESSMENT & PLAN NOTE
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 Follow up scan with more punctuate ICH and perilesional edema as well as IV after severe hypertensive episode.     POD#5           Continue ICU care           Follow up scan stable  Continue neurochecks q1h  HOB>30  Keppra 500mg BID  cEEG to r/o any seizure   Na 140-150  Continue Dex 4q6hrs for cerebral edema  HDS, keep SBP <140, now on Cardene   Sating well at RA  AF and WBC WNL and H/H stable  Daily PT/OT  DVTx: SCD/TCD  Further management of care per NCC  Will continue to monitor. Please page NSGY with any changes in exam

## 2019-04-26 NOTE — PROGRESS NOTES
[facility]  Nephrology  Progress Note    Patient Name: Holly Patel  MRN: [id number]  Admission Date: 4/22/2019  Hospital Length of Stay: 4 days  Attending Provider: Jaciel Roblero MD   Primary Care Physician: Stan Sosa MD  Principal Problem:Brown tumor    Subjective:     Interval History:   3 hour HD treatment yesterday ended early by 1 hour 2/2 increased agitation that was preceded by hypertensive episode.  Repeat CT noted for increased cerebral edema.  Mental status improved overnight and stable today.  Mild hyperkalemia on AM labs.  Worsening hypocalcemia after replacement yesterday 2/2 HBS.    Review of patient's allergies indicates:   Allergen Reactions    Chloral hydrate Hallucinations     Other reaction(s): Hallucinations  Other reaction(s): Hives    Hydrocodone Other (See Comments)     Mental status changes     Current Facility-Administered Medications   Medication Frequency    0.9%  NaCl infusion Once    calcitriol solution 0.5 mcg BID    calcium carbonate 500 mg/5 mL (1,250 mg/5 mL) suspension 2,000 mg TID    carvedilol tablet 25 mg BID    dexamethasone injection 4 mg Q6H    dextrose 50% injection 12.5 g PRN    famotidine tablet 20 mg Daily    glucagon (human recombinant) injection 1 mg PRN    hydrALAZINE injection 10 mg Q4H PRN    hydrALAZINE tablet 50 mg Q8H    insulin aspart U-100 pen 1-10 Units Q6H PRN    labetalol 20 mg/4 mL (5 mg/mL) IV syring Q4H PRN    lacosamide tablet 50 mg BID    levETIRAcetam tablet 500 mg BID    midazolam (VERSED) 1 mg/mL injection 2 mg On Call Procedure    mupirocin 2 % ointment 1 g BID    niCARdipine 40 mg/200 mL infusion Continuous    ondansetron disintegrating tablet 4 mg Q6H PRN    oxyCODONE immediate release tablet 5 mg Q6H PRN    polyethylene glycol packet 17 g Daily    senna-docusate 8.6-50 mg per tablet 2 tablet Daily    sodium chloride 0.9% flush 10 mL PRN    sodium polystyrene powder 30 g Q4H    tacrolimus  (PROGRAF) 1 mg/mL oral syringe Daily    tacrolimus (PROGRAF) 1 mg/mL oral syringe Daily       Objective:     Vital Signs (Most Recent):  Temp: 98 °F (36.7 °C) (04/26/19 1105)  Pulse: 71 (04/26/19 1105)  Resp: 15 (04/26/19 1105)  BP: 131/73 (04/26/19 1105)  SpO2: 99 % (04/26/19 1105)  O2 Device (Oxygen Therapy): nasal cannula (04/26/19 1105) Vital Signs (24h Range):  Temp:  [97.4 °F (36.3 °C)-98 °F (36.7 °C)] 98 °F (36.7 °C)  Pulse:  [70-94] 71  Resp:  [9-40] 15  SpO2:  [93 %-100 %] 99 %  BP: (116-179)/(58-97) 131/73  Arterial Line BP: (118-192)/(66-94) 134/75     Weight: 54.8 kg (120 lb 13 oz) (04/22/19 1100)  Body mass index is 22.1 kg/m².  Body surface area is 1.55 meters squared.    I/O last 3 completed shifts:  In: 1456.4 [P.O.:380; I.V.:976.4; IV Piggyback:100]  Out: 0     Physical Exam   Constitutional: She appears well-developed. She appears lethargic. No distress.   HENT:   Right Ear: External ear normal.   Left Ear: External ear normal.   Facial edema   Eyes: Conjunctivae and EOM are normal. Right eye exhibits no discharge. Left eye exhibits no discharge.   Neck: Normal range of motion. Neck supple.   Cardiovascular: Normal rate and regular rhythm. Exam reveals no gallop and no friction rub.   No murmur heard.  Pulmonary/Chest: Effort normal and breath sounds normal. No respiratory distress. She has no wheezes. She has no rales.   Abdominal: Soft. Bowel sounds are normal. She exhibits no distension. There is no tenderness.   Musculoskeletal: Normal range of motion. She exhibits no edema or deformity.   Neurological: She appears lethargic.   Opens eyes to voice/follow commands   Skin: Skin is warm and dry. She is not diaphoretic.   Psychiatric: She has a normal mood and affect. Her behavior is normal.       Significant Labs:  CBC:   Recent Labs   Lab 04/26/19  0807 04/26/19  0946   WBC 8.79  --    RBC 4.18  --    HGB 10.9*  --    HCT 33.3* 33*     --    MCV 80*  --    MCH 26.1*  --    MCHC 32.7  --       CMP:   Recent Labs   Lab 04/26/19  0308  04/26/19  1126   *   < > 163*   CALCIUM 6.0*   < > 5.6*   ALBUMIN 2.8*  --   --    PROT 7.3  --   --    *   < > 131*   K 5.3*   < > 5.7*   CO2 18*   < > 17*   CL 97   < > 98   BUN 66*   < > 73*   CREATININE 7.9*   < > 8.3*   ALKPHOS 530*  --   --    ALT 7*  --   --    AST 24  --   --    BILITOT 0.5  --   --     < > = values in this interval not displayed.          Assessment/Plan:     ESRD on hemodialysis  ESRD on iHD TTS  [facility]  Dr. Bass  3.5 hours  EDW ~ 50 kg  LFA AVF    Plan/Recommendations:  -will hold RRT today  -will likely need tomorrow, will consider performing low flow HD with low flux dialyzer for minimal osmolar shifts  -Calcium Gluconate 3 gm IVPB x 1 now  -continue strict I/O's  -daily renal panel with phos added      Hypocalcemia  2/2 non-compliance with activated vit D and calcium supplementation s/p parathyroidectomy  -PTH elevated @ 407  -phos levels daily  -continue calcitriol 0.5 mcg po BID  -continue Calcium Carbonate 2 g TID   -calcium gluconate 3 mg IVPB x 1 now        Raheem Trujillo NP  Nephrology  [facility]

## 2019-04-26 NOTE — PROCEDURES
DATE OF SERVICE:  04/25/2019.    EEG NUMBER:  FH [id number].    LOCATION OF SERVICE:  [facility].    REQUESTED BY:  Dr. Roblero.    ICU EEG/VIDEO MONITORING REPORT    METHODOLOGY:  Electroencephalographic (EEG) is recorded with electrodes placed   according to the International 10-20 placement system.  Thirty two (32) channels   of digital signal (sampling rate of 512/sec), including T1 and T2, were   simultaneously recorded from the scalp and may include EKG, EMG, and/or eye   monitors.  Recording band pass was 0.1 to 512 Hz.  Digital video recording of   the patient is simultaneously recorded with the EEG.  The patient is instructed   to report clinical symptoms which may occur during the recording session.  EEG   and video recording are stored and archived in digital format.  Activation   procedures, which include photic stimulation, hyperventilation and instructing   patients to perform simple tasks, are done in selected patients.    The EEG is displayed on a monitor screen and can be reviewed using different   montages.  Computer-assisted analysis is employed to detect spike and   electrographic seizure activity.   The entire record is submitted for computer   analysis.  The entire recording is visually reviewed, and the times identified   by computer analysis as being spikes or seizures are reviewed again.    Compressed spectral analysis (CSA) is also performed on the activity recorded   from each individual channel.  This is displayed as a power display of   frequencies from 0 to 30 Hz over time.   The CSA is reviewed looking for   asymmetries in power between homologous areas of the scalp, then compared with   the original EEG recording.    avandeo software was also utilized in the review of this study.  This software   suite analyzes the EEG recording in multiple domains.  Coherence and rhythmicity   are computed to identify EEG sections which may contain organized seizures.    Each channel undergoes analysis to  detect the presence of spike and sharp waves   which have special and morphological characteristics of epileptic activity.  The   routine EEG recording is converted from special into frequency domain.  This is   then displayed comparing homologous areas to identify areas of significant   asymmetry.  Algorithm to identify non-cortically generated artifact is used to   separate artifact from the EEG.    RECORDING TIMES:  Start on 04/25/2019 at 16:36.  End on 04/26/2019 at 07:00.  A total of 11 hours and 27 minutes of EEG monitoring was obtained.    EEG FINDINGS:  Recording was obtained at the patient's bedside in the ICU.  The   patient was moving around spontaneously and breathing on her own.  Background in   general was a diffuse mixture of fairly rhythmic 5-6 Hz theta, but there was   some intermixed 1 to 2 Hz delta noted bilaterally.  There was an asymmetry with   much lower amplitude activity noted in the left mid and posterior temporal   regions.  Throughout the recording, there was no evidence for spike or sharp   wave activity.  Sleep was recorded and the background became a more diffuse   mixture of 2 to 3 Hz delta and midrange theta, again with the asymmetry in   amplitude and lower amplitude seen in the left, mid and posterior temporal   region.  Activation procedures were not carried out.    IMPRESSION:  Abnormal EEG with diffuse slowing indicative of a   moderate-to-marked generalized encephalopathy.  There is, however, an asymmetry   with lower amplitude and with slower activity in the left, mid and posterior   temporal region indicating a focal area of cortical dysfunction, likely on a   structural basis.  No epileptic activity was recorded.      RR/HN  dd: 04/26/2019 10:50:13 (CDT)  td: 04/26/2019 11:05:47 (CDT)  Doc ID   #[id number]  Job ID #[id number]    CC:

## 2019-04-27 NOTE — PROGRESS NOTES
IGLESIA Fitzpatrick aware ionized calcium level 0.63 and 2pm sodium which just resulted 136, also aware NGT to be taken out and med routes will need to be changed

## 2019-04-27 NOTE — PLAN OF CARE
Problem: Adult Inpatient Plan of Care  Goal: Plan of Care Review  Outcome: Ongoing (interventions implemented as appropriate)  POC reviewed with pt at 0500. Pt verbalized some understanding. Questions and concerns addressed with mother at start of shift. HD done per order for 2 hours overnight. 2% gtt continued @ 50 mL/hr. BMP trended q4. Cardene gtt titrated to maintain SBP < 140 per order. Pt progressing toward goals. Will continue to monitor. See flowsheets for full assessment and VS info

## 2019-04-27 NOTE — PROGRESS NOTES
Holly Patel is a 28 y.o. female patient.  No acute issues  Had HD last night for 2 hours  Scheduled Meds:   calcitriol  0.5 mcg Per NG tube BID    calcium carbonate  2,000 mg Per NG tube TID    carvedilol  25 mg Per NG tube BID    dexamethasone  4 mg Intravenous Q6H    famotidine  20 mg Per NG tube Daily    hydrALAZINE  50 mg Per NG tube Q8H    lacosamide  50 mg Per NG tube BID    levETIRAcetam  500 mg Per NG tube BID    polyethylene glycol  17 g Per NG tube Daily    senna-docusate 8.6-50 mg  2 tablet Per NG tube Daily    tacrolimus  3 mg Per NG tube Daily    tacrolimus  3 mg Per NG tube Daily       Review of patient's allergies indicates:   Allergen Reactions    Chloral hydrate Hallucinations     Other reaction(s): Hallucinations  Other reaction(s): Hives    Hydrocodone Other (See Comments)     Mental status changes       Past Medical History:   Diagnosis Date    Anemia in ESRD (end-stage renal disease) 10/12/2015    dialysis tues, thursday, sat; access left arm    Chronic rejection of liver transplant 3/22/2016    Depression     Encounter for blood transfusion     ESRD on hemodialysis 9/30/2015    History of recent hospitalization 05/2018    pneumonia    History of splenomegaly 4/12/2016    Immunosuppressed 8/5/2017    Iron deficiency anemia secondary to inadequate dietary iron intake 8/16/2017    She [facility] iron [facility].   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)    Moderate protein-calorie malnutrition 8/16/2017    MRSA bacteremia 8/6/2017    Pneumonia     Prophylactic immunotherapy 8/4/2014    Renovascular hypertension 10/2/2015    Secondary hyperparathyroidism 8/5/2017    Seizures     Sialadenitis 3/21/2018    Thrombocytopenia 4/12/2016     Past Surgical History:   Procedure Laterality Date    BIOPSY, LIVER, TRANSJUGULAR APPROACH N/A 11/16/2018    Performed by [facility] OR  2ND FLR    BIOPSY-LIVER N/A 2017    Performed by [facility] at [facility] OR 2ND FLR    BIOPSY-LIVER N/A 3/22/2016    Performed by [facility] at [facility] OR 2ND FLR     SECTION      x 2    CONIZATION-CERVICAL-LEEP N/A 6/15/2018    Performed by Neelam Marroquin [facility] at [facility] OR    IAHZBFURTHOL-GJYOGVI-FD; upper extremity Left 2015    Performed by Idalia Diaz MD at [facility] OR 2ND FLR    CRANIOPLASTY  2019    Performed by Jose Luis Powell MD at [facility] OR 2ND FLR    CRANIOTOMY-- left crani for clot evac with microscrope Left 2019    Performed by Jose [facility] at [facility] OR 2ND FLR    EMBOLIZATION, BLOOD VESSEL N/A 2018    Performed by Aren Ramos MD at [facility] CATH LAB    Exam Under Anesthesia N/A 2018    Performed by [facility] at [facility] OR 2ND FLR    Exam under anesthesia N/A 2018    Performed by [facility]am [facility]roquin [facility] at [facility] OR    Exam under anesthesia (ADD ON ) N/A 2018    Performed by NICKIE Alvarez MD at [facility] OR    Exam under anesthesia -cervical suturing  N/A 2018    Performed by Lei Sims III, MD at [facility] OR    EXCISION, NEOPLASM, SKULL with Cranioplasty Left 2019    Performed by Jose [facility] Powell [facility] at [facility] OR 2ND FLR    FISTULOGRAM Left 2015    Performed by Idalia Diaz MD at [facility] CATH LAB    LIVER BIOPSY      LIVER TRANSPLANT  1992    PARATHYROIDECTOMY, Minimally Invasive Bilateral Exploration Bilateral 3/27/2019    Performed by Ashley Guallpa MD at [facility] OR 2ND FLR    SUTURE REPAIR,CERVIX  2018    Performed by [facility]am [facility]roquin MD at [facility] OR    TUBAL LIGATION        reports that she has never smoked. She has never used smokeless tobacco. She reports that she does not drink alcohol or use drugs.   Family History   Problem Relation Age of Onset    Hypertension Mother     Hypertension Father     Cancer Sister     Heart attack Maternal Uncle     Melanoma Neg  Hx     Breast cancer Neg Hx     Colon cancer Neg Hx     Ovarian cancer Neg Hx           Vital Signs Range (Last 24H):  Temp:  [97.7 °F (36.5 °C)-99 °F (37.2 °C)]   Pulse:  [70-90]   Resp:  [12-24]   BP: (114-162)/()   SpO2:  [94 %-100 %]   Arterial Line BP: (120-173)/(65-95)     I & O (Last 24H):    Intake/Output Summary (Last 24 hours) at 4/27/2019 0919  Last data filed at 4/27/2019 0900  Gross per 24 hour   Intake 3087.52 ml   Output 2500 ml   Net 587.52 ml           Physical Exam:  Constitutional: She appears well-developed. She appears fatigued but arousable No distress.   Facial edema   Eyes: Conjunctivae and EOM are normal. Right eye exhibits no discharge. Left eye exhibits no discharge.   Neck: Normal range of motion. Neck supple.   Cardiovascular: Normal rate and regular rhythm. Exam reveals no gallop and no friction rub.   No murmur heard.  Pulmonary/Chest: Effort normal and breath sounds normal. No respiratory distress. She has no wheezes. She has no rales.   Abdominal: Soft. Bowel sounds are normal. She exhibits no distension. There is no tenderness.   Musculoskeletal: Normal range of motion. She exhibits no edema or deformity.   Neurological: She appears fatigued.  Opens eyes to voice/follow commands   Skin: Skin is warm and dry. She is not diaphoretic.   Psychiatric: She has a normal mood and affect. Her behavior is normal.       Laboratory:  CBC:   Recent Labs   Lab 04/26/19  1638 04/26/19  2205   WBC 6.10  --    RBC 3.77*  --    HGB 10.0*  --    HCT 30.5* 32*   *  --    MCV 81*  --    MCH 26.5*  --    MCHC 32.8  --      BMP:   Recent Labs   Lab 04/26/19  0308  04/27/19  0423   *   < > 134*   *   < > 134*   K 5.3*   < > 4.3   CL 97   < > 97   CO2 18*   < > 18*   BUN 66*   < > 57*   CREATININE 7.9*   < > 6.7*   CALCIUM 6.0*   < > 7.5*   MG 2.2  --   --     < > = values in this interval not displayed.     ABGs:   Recent Labs   Lab 04/26/19  2208   PH 7.407   PCO2 31.8*   PO2  81   HCO3 20.0*   POCSATURATED 96   BE -5         Diagnostic Results:      ESRD on hemodialysis  ESRD on iHD TTS  [facility]  Dr. Bass  3.5 hours  EDW ~ 50 kg  LFA AVF     Plan/Recommendations:  Had HD last night-  Will hold today and monitor  -continue strict I/O's  -daily renal panel with phos added        Hypocalcemia  2/2 non-compliance with activated vit D and calcium supplementation s/p parathyroidectomy  -PTH elevated @ 407  -phos levels daily  -continue calcitriol 0.5 mcg po BID  -continue Calcium Carbonate 2 g TID         Carlyn Boateng  4/27/2019

## 2019-04-27 NOTE — ASSESSMENT & PLAN NOTE
- ESRD w/ HD on MWF at home  - appreciate nephrology help  - avoid BUN elevation, HD if BUN at 75-80

## 2019-04-27 NOTE — PLAN OF CARE
Problem: Adult Inpatient Plan of Care  Goal: Plan of Care Review  Outcome: Revised  POC reviewed with pt and family at 1100. Pt  And mother verbalized understanding. Questions and concerns addressed. No acute events today. Pt progressing toward goals. Will continue to monitor. See flowsheets for full assessment and VS info. NGT dc'd at 4:45pm, restraints dc'd, up to BSC x 2 passing gas no BM yet, remains on cardene and 2% at this time.

## 2019-04-27 NOTE — PROGRESS NOTES
DR. Larson and team at bedside , reviewed all labs including level to check prograf. Discussed 2%, sheyla needed at present, LOC, try to advance diet, DC NGT, No BM, KUB ordered, increase BP meds, try to wean cardene, will continue to closely monitor, pts speech much better and appropriate at times, much more alert after family at bedside, occassional incorrect words but otherwise clear.

## 2019-04-27 NOTE — ASSESSMENT & PLAN NOTE
Post op skull lesion resection  NSGY following  SBP control  Goal euthermia  Goal eunatremia  Goal euvolemia  Goal euglycemia

## 2019-04-27 NOTE — ASSESSMENT & PLAN NOTE
- S/p liver transplant 1991  - Consulted hepatology; appreciate assistance  - Daily tacro level  - tacrolimus dosing per hepatology

## 2019-04-27 NOTE — PROGRESS NOTES
2hr HD initiated via LUE fistula using 2 15 gauge needles. /. UF set to 2L. BMP to be drawn after 1hr of HD treatment per ICU team.Patient appears lethargic; wakes up to pain and is confused. Will continue to monitor.

## 2019-04-27 NOTE — ASSESSMENT & PLAN NOTE
- On Verapamil, Irbasartan, Hydralazine, Coreg, Clonidine at home   home Coreg 25 mg BID  - wean cardene  - increase Hydralazine by 25

## 2019-04-27 NOTE — PROGRESS NOTES
[facility]  Neurocritical Care  Progress Note    Admit Date: 4/22/2019  Service Date: 04/27/2019  Length of Stay: 5    Subjective:     Chief Complaint: Brown tumor    History of Present Illness: 27 yo F w/ PMH significant for liver transplant in 1991, recent thyroidectomy on 3/27/2019, ESRD on HD (MWF), and epilepsy who presents to [facility] for higher level of care following planned surgical excision of L calvarial lesion s/p excision and cranioplasty 4/22/19. The pt presented to [facility] on 03/12/2019 with a complaint of headaches, among other symptoms, and received a workup that included a CT head which showed a left temporal calvarium lesion with mass effect. At that time she shared that she continued to have a headache, noting the pain was usually over the left temporal aspect of her head but migrated over to the right temporal aspect. She states the pain on the right is exacerbated with palpation of the area. She has no additional symptomatic complaints at this time. Currently stable following surgery. Denies acute pain, otherwise comfortable w/ non-focal neurological exam.         Hospital Course: 4/22: Admitted to [facility]. Initiated cardene gtt to maintain SBP <140. Consulted hepatology and nephrology. Acute worsening of neuro exam. STAT CT revealed enlarging hematoma w/ midline shift. Urgently taken down to OR for evacuation. Return from OR intubated.   4/23: Neuro exam improved from previous. Somnolent but rousable. Following commands. Moving all extremities spontaneously; RUE 4/5. Pending CRRT vs HD today.  04/24/2019: no acute adverse events overnight  4/27: KUB, resume oral feeds, ionized Ca, HD over night successful, increase hydralazine, decrease HTS    Review of Symptoms:   Constitutional: Denies fevers or chills.  ENT: no hearing difficulty, no visual changes  Pulmonary: Denies shortness of breath or cough.  Cardiology: Denies chest pain or palpitations.  GI: Denies abdominal pain or  constipation.  Neurologic: Denies new weakness, headaches, or paresthesias.   : no dysuria  Musk: no muscle pain, no joint pain  Psych: no hallucinations    Physical Exam:  GA: comfortable, no acute distress.   HEENT: No scleral icterus or JVD.   Pulmonary: Clear to auscultation A/L. No wheezing, crackles, or rhonchi.  Cardiac: RRR S1 & S2 w/o rubs/murmurs/gallops.   Abdominal: Bowel sounds present x 4. No appreciable hepatosplenomegaly.  Skin: No jaundice, rashes, or visible lesions.  Pulses: 2+ dp Bilat    Neuro:  --sedation: none  --GCS: E4V5M6  --Mental Status: awake, oriented to self and place    --CN II-XII grossly intact.   --Pupils 3-->2mm, PERRL.   --brainstem: intact  --Motor: 4/5 throughout  --sensory: intact to soft touch and pain throughout  --Reflexes: not tested  --Gait: deferred    Recent Labs   Lab 04/27/19 0813   WBC 7.95   RBC 4.20   HGB 11.3*   HCT 34.0*      MCV 81*   MCH 26.9*   MCHC 33.2     Recent Labs   Lab 04/27/19  0308  04/27/19 0813   CALCIUM 7.3*   < > 6.3*   PROT 7.9  --   --    *   < > 135*   K 4.2   < > 4.3   CO2 19*   < > 20*   CL 97   < > 99   BUN 65*   < > 63*   CREATININE 7.2*   < > 7.1*   ALKPHOS 514*  --   --    ALT <5*  --   --    AST 24  --   --    BILITOT 0.6  --   --     < > = values in this interval not displayed.     Recent Labs   Lab 04/27/19  0308 04/27/19 0813   INR 1.2  --    APTT  --  25.7          Temp: 98.4 °F (36.9 °C)  Pulse: 73  Rhythm: normal sinus rhythm  BP: 120/66(on cardene)  MAP (mmHg): 88  Resp: 16  SpO2: 97 %  O2 Device (Oxygen Therapy): nasal cannula    Temp  Min: 97.7 °F (36.5 °C)  Max: 98.4 °F (36.9 °C)  Pulse  Min: 70  Max: 90  BP  Min: 108/56  Max: 162/102  MAP (mmHg)  Min: 76  Max: 125  Resp  Min: 12  Max: 24  SpO2  Min: 94 %  Max: 100 %    04/26 0701 - 04/27 0700  In: 2854.8 [I.V.:1634.8]  Out: 2500    Unmeasured Output  Stool Occurrence: 0    Last Bowel Movement: 04/21/19    Body mass index is 22.1 kg/m².      I have personally  reviewed all labs, imaging, and studies today        Assessment/Plan:     Neuro  Cytotoxic brain edema  - See ICH    Nontraumatic subcortical hemorrhage of left cerebral hemisphere  - Patient taken urgently to the OR following worsening neuro exam w/ CT demonstrating L temporal increased edema, hematoma, and midline shift  - S/p urgent L clot evacuation  - Post-op CT demonstrating:  Small volume residual blood products w/ post-operative air within L temporal lobe/surgical site. Persistent vasogenic edema w/ continued mass effect of 0.5 cm w/ rightward shift  - Improvement in neuro exam  - Continue to monitor w/ q1 hour neurochecks  - NSGY following    Brain compression  Wean hypertonic saline  Follow Na    Seizures  - Continue home medications: Keppra 500 mg BID, Vimpat 50 mg BID    Cardiac/Vascular  Renovascular hypertension  - On Verapamil, Irbasartan, Hydralazine, Coreg, Clonidine at home   home Coreg 25 mg BID  - wean cardene  - increase Hydralazine by 25      Renal/  Hypocalcemia  - Corrected Ca 6.6  - Calcitriol 0.5 mcg BID  - Calcium carbonate 2000 mg TID    ESRD on hemodialysis  - ESRD w/ HD on MWF at home  - appreciate nephrology help  - avoid BUN elevation, HD if BUN at 75-80    Immunology/Multi System  Immunosuppressed  - Stable  - See above    Hematology  Thrombocytopenia  Monitor PLTs  - No active signs of bleeding      Oncology  Brain tumor  Post op skull lesion resection  NSGY following  SBP control  Goal euthermia  Goal eunatremia  Goal euvolemia  Goal euglycemia      Endocrine  Moderate protein-calorie malnutrition  ADAT    Secondary hyperparathyroidism  Nephrology recs    GI  Liver replaced by transplant  - S/p liver transplant 1991  - Consulted hepatology; appreciate assistance  - Daily tacro level  - tacrolimus dosing per hepatology          The patient is being Prophylaxed for:  Venous Thromboembolism with: Chemical  Stress Ulcer with: H2B  Ventilator Pneumonia with: not applicable    Activity  Orders          Diet renal [facility]; Dysphagia (Mechanical Soft): Renal starting at 04/27 1116    Discontinue arterial line starting at 04/25 1021        Full Code    Matthew Larson MD  Neurocritical Care  [facility] time 33 minutes  Critical Care was time spent personally by me on the following activities: development of treatment plan with patient or surrogate and bedside caregivers, discussions with consultants, evaluation of patient's response to treatment, examination of patient, ordering and performing treatments and interventions, ordering and review of laboratory studies, ordering and review of radiographic studies, pulse oximetry, re-evaluation of patient's condition. This critical care time did not overlap with that of any other provider or involve time for any procedures.

## 2019-04-27 NOTE — PROGRESS NOTES
2hr HD tx completed. Tolerated well. Total net UF of 2L. Rinsed back blood. Pulled out needles. Pressure applied and hemostasis achieved. Positive thrill and bruit. Gauzed and taped.

## 2019-04-27 NOTE — PROGRESS NOTES
IGLESIA Fitzpatrick with neurocritical care notified of 2% order that was only ordered for 12hrs and current sodium 134, states to continue a5 50ml/hr until team rounds and then discuss.

## 2019-04-28 NOTE — PROGRESS NOTES
Skyla, resident with neurocritical care notified of all labs including calcium 5.0, BUN 88 , potassium 5.1 and sodium 133

## 2019-04-28 NOTE — PT/OT/SLP EVAL
"Physical Therapy Evaluation    Patient Name:  Holly Patel   MRN:  [id number]    Recommendations:     Discharge Recommendations:  rehabilitation facility   Discharge Equipment Recommendations: none   Barriers to discharge: Decreased caregiver support    Assessment:     Holly Patel is a 28 y.o. female admitted with a medical diagnosis of Brown tumor.  She presents with the following impairments/functional limitations:  weakness, impaired endurance, impaired functional mobilty, gait instability, impaired cognition, decreased lower extremity function, impaired balance, decreased safety awareness, decreased upper extremity function, impaired cardiopulmonary response to activity.    During today's evaluation, pt aphasic and appears disoriented to time and place. Pt able to follow simple commands inconsistently with delayed following. Mild unsteadiness while ambulating currently and limited by fatigue and dizziness. Appropriate for discharge to IP rehab to address deficits and to assist with progression of mobility. Appropriate to assist with age appropriate behavior including higher level cognitive task and dual tasking with mobility.     Rehab Prognosis: Good; patient would benefit from acute skilled PT services to address these deficits and reach maximum level of function.    Recent Surgery: Procedure(s) (LRB):  CRANIOTOMY-- left crani for clot evac with microscrope (Left) 6 Days Post-Op    Plan:     During this hospitalization, patient to be seen 3 x/week to address the identified rehab impairments via gait training, therapeutic activities, therapeutic exercises, neuromuscular re-education and progress toward the following goals:    · Plan of Care Expires:  05/28/19    Subjective     Chief Complaint: dizziness while EOB  Patient/Family Comments/goals: "Blood. Taking blood" per pt during session when asked where she was.   Pain/Comfort:  · Pain Rating 1: 0/10    Patients cultural, spiritual, " Buddhism conflicts given the current situation: no    Living Environment:  Pt unable to provide PLOF. History obtained from previous documentation from OT note.   Living Environment: Pt lives with mother in 1SH with 0STE; bathroom contains tub-shower combo with no DME.  Previous level of function: as of 8/2018: Indep with ADL and functional mobility/using no DME. No longer driving. Pt is an ESRD patient and goes to HD 3x/w.   Roles and Routines: daughter, sister, friend, care taker to self, home and community dweller  Equipment Used at Home:  none  Assistance upon Discharge: yes, mother    Objective:     Communicated with nsg prior to session.  Patient found supine with telemetry, peripheral IV, pulse ox (continuous), blood pressure cuff  upon PT entry to room.    General Precautions: Standard, fall, seizure, aspiration, aphasia   Orthopedic Precautions:N/A   Braces: N/A     OBJECTIVE FINDINGS  Cognitive Exam   Alert and Oriented x 1 (self)   Follows 25 percent of one-step commands; automatic command following     Communication   Dyasthric   Global  aphasia and no inattention observed    Postural Exam   Patient presented with the following abnormalities:    -       Rounded shoulders  -       Forward head    Sensation      -       Intact  light/touch (B) LE based on observation    Skin Integrity/Edema       -       Skin integrity: Visible skin intact    Vision   Intact    Hearing    Intact    STRENGTH/ROM ASSESSMENT  R LE:    ROM: WFL   Strength:  WFL    L LE:    ROM: WFL  Strength:  WFL    FUNCTIONAL MOBILITY  Rehab tech present: no  Bed Mobility   Supine to Sit: minimum assistance    Sit to Supine : minimum assistance    Lateral Scooting towards EOB: minimum assistance     Transfers   Sit to Stand: minimum assistance with hand-held assist for 2 trials    Gait   Gait Distance: 5 small steps to bedside commode   Assistance Level: Minimal Assistance   Demonstrated:trunk hyperextension producing posterior trunk lean.  Mild unsteadiness denoted.    Encouraged: Erect posture, midline orientation    Balance   Sitting:  Static: CGA       Dynamic: CGA     Standing: Static: MIN A           Dynamic: MIN A          Therapeutic Activities and Exercises:   PT educated pt on the following  - role of PT  - PT POC (including frequency and duration while in hospital)  - discharge recommendation (IP Rehab) and equipment needs (TBD)  - level of assistance currently req (1 person)and safety precautions with nsg staff   All questions and concerns answered and addressed. White board updated with pertinent information. Nsg notified.     *PT assisted pt to bedside commode for toileting activity.     AM-PAC 6 CLICK MOBILITY  Total Score:17     Patient left supine with all lines intact, call button in reach and bed alarm on.    GOALS:   Multidisciplinary Problems     Physical Therapy Goals        Problem: Physical Therapy Goal    Goal Priority Disciplines Outcome Goal Variances Interventions   Physical Therapy Goal     PT, PT/OT Ongoing (interventions implemented as appropriate)     Description:  Goals to be met by: 7 days ()    Patient will increase functional independence with mobility by performin. Supine to sit with Set-up Gordon  2. Sit to supine with Set-up Gordon  3. Sit to stand transfer with Supervision  4. Gait  x 200 feet with Stand By Assistance using no assistive device  5. Lower extremity exercise program x30 reps per handout, with independence to improve muscular strength and endurance.                         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 ()    Moderate protein-calorie malnutrition 2017    MRSA bacteremia 2017    Pneumonia     Prophylactic immunotherapy 2014    Renovascular hypertension 10/2/2015    Secondary hyperparathyroidism 2017    Seizures     Sialadenitis 3/21/2018    Thrombocytopenia 2016       Past Surgical History:   Procedure Laterality Date    BIOPSY, LIVER, TRANSJUGULAR APPROACH N/A 2018    Performed by [facility] OR 2ND FLR    BIOPSY-LIVER N/A 2017    [facility]    BIOPSY-LIVER N/A 3/22/2016    Performed by [facility] OR 2ND [facility]     SECTION      x 2    CONIZATION-CERVICAL-LEEP N/A 6/15/2018    Performed by [facility]am [facility]roquin [facility] at [facility] OR    [id number]; upper extremity Left 2015    Performed by Idalia Diaz [facility] at [facility] OR 2ND FLR    CRANIOPLASTY  2019    Performed by Jose Luis Powell [facility] at [facility] OR 2ND FLR    CRANIOTOMY-- left crani for clot evac with microscrope Left 2019    Performed by [facility] at [facility] OR 2ND FLR    EMBOLIZATION, BLOOD VESSEL N/A 2018    Performed by Aren Ramos MD at [facility] CATH LAB    Exam Under Anesthesia N/A 2018    Performed by [facility] at [facility] OR 2ND FLR    Exam under anesthesia N/A 2018    Performed by [facility] at [facility] OR    Exam under anesthesia (ADD ON ) N/A 2018    Performed by NICKIE Alvarez MD at [facility] OR    Exam under anesthesia -cervical suturing  N/A 2018    Performed by Lei Sims III, MD at [facility] OR    EXCISION, NEOPLASM, SKULL with Cranioplasty Left 2019    Performed by Jose Luis Powell MD at  [facility] OR 2ND FLR    FISTULOGRAM Left 12/4/2015    Performed by Idalia Diaz MD at [facility] CATH LAB    LIVER BIOPSY      LIVER TRANSPLANT  09/1992    PARATHYROIDECTOMY, Minimally Invasive Bilateral Exploration Bilateral 3/27/2019    Performed by Ashley Guallpa MD at [facility] OR 2ND FLR    SUTURE REPAIR,CERVIX  6/19/2018    [facility] at [facility] OR    TUBAL LIGATION  2010       Time Tracking:     PT Received On: 04/28/19  PT Start Time: 1042     PT Stop Time: 1100  PT Total Time (min): 18 min     Billable Minutes: Evaluation 18      Shama Gant, PT  04/28/2019

## 2019-04-28 NOTE — SUBJECTIVE & OBJECTIVE
Medications:  Continuous Infusions:   nicardipine 5 mg/hr (04/27/19 1905)    Sodium Chloride 2% 30 mL/hr at 04/27/19 1905     Scheduled Meds:   calcitriol  0.5 mcg Oral BID    calcium carbonate  2,000 mg Oral TID    carvedilol  25 mg Oral BID    dexamethasone  4 mg Intravenous Q6H    [START ON 4/28/2019] famotidine  20 mg Oral Daily    hydrALAZINE  75 mg Oral Q8H    lacosamide  50 mg Oral BID    levETIRAcetam  500 mg Oral BID    [START ON 4/28/2019] polyethylene glycol  17 g Oral Daily    [START ON 4/28/2019] senna-docusate 8.6-50 mg  2 tablet Oral Daily    [START ON 4/28/2019] tacrolimus  3 mg Oral Daily    tacrolimus  3 mg Oral Daily     PRN Meds:dextrose 50%, glucagon (human recombinant), hydrALAZINE, insulin aspart U-100, labetalol, midazolam, ondansetron, oxyCODONE, sodium chloride 0.9%     Review of Systems     Unable to aphasia   Objective:     Weight: 54.8 kg (120 lb 13 oz)  Body mass index is 22.1 kg/m².  Vital Signs (Most Recent):  Temp: 98 °F (36.7 °C) (04/27/19 1905)  Pulse: 81 (04/27/19 1935)  Resp: 20 (04/27/19 1935)  BP: 132/65 (04/27/19 1935)  SpO2: 98 % (04/27/19 1935) Vital Signs (24h Range):  Temp:  [97.7 °F (36.5 °C)-98.4 °F (36.9 °C)] 98 °F (36.7 °C)  Pulse:  [72-90] 81  Resp:  [12-23] 20  SpO2:  [94 %-99 %] 98 %  BP: (108-146)/(56-84) 132/65  Arterial Line BP: (118-157)/(61-77) 131/69     Date 04/27/19 0700 - 04/28/19 0659   Shift 8797-2543 8700-7152 4948-3687 24 Hour Total   INTAKE   P.O. 225 180  405   I.V.(mL/kg) 713.3(13) 272.1(5)  985.4(18)   Other 100   100   Shift Total(mL/kg) 1038.3(18.9) 452.1(8.2)  1490.4(27.2)   OUTPUT   Shift Total(mL/kg)       Weight (kg) 54.8 54.8 54.8 54.8                        Hemodialysis AV Fistula Left forearm (Active)   Needle Size 15ga 4/25/2019  1:20 PM   Site Assessment Clean;Dry;Intact 4/25/2019  1:20 PM   Patency Present;Thrill;Bruit 4/25/2019  1:20 PM   Status Accessed 4/25/2019  1:20 PM   Flows Good 4/25/2019  3:05 AM   Dressing  Intervention Dressing reinforced 4/25/2019  3:05 AM   Dressing Status Clean;Intact;Dry 4/25/2019 11:01 AM   Site Condition No complications 4/25/2019 11:01 AM   Dressing Occlusive 4/25/2019 11:01 AM   Drainage Description Other (Comment) 4/14/2018  5:26 PM       Neurosurgery Physical Exam  E4V4M6    aox1 name  SHRAVAN   Partial transcortical motor aphasia.  Move L side and RLE AG, RUE paresis with minimal WD  Wound c/d/i        Significant Labs:  Recent Labs   Lab 04/26/19  0308  04/27/19  0308 04/27/19  0423 04/27/19  0813 04/27/19  1405   *   < > 142* 134* 139*  --    *   < > 134* 134* 135* 136   K 5.3*   < > 4.2 4.3 4.3  --    CL 97   < > 97 97 99  --    CO2 18*   < > 19* 18* 20*  --    BUN 66*   < > 65* 57* 63*  --    CREATININE 7.9*   < > 7.2* 6.7* 7.1*  --    CALCIUM 6.0*   < > 7.3* 7.5* 6.3*  --    MG 2.2  --   --   --   --   --     < > = values in this interval not displayed.     Recent Labs   Lab 04/26/19  0807 04/26/19 1638 04/26/19 2205 04/27/19  0813   WBC 8.79  --  6.10  --  7.95   HGB 10.9*  --  10.0*  --  11.3*   HCT 33.3*   < > 30.5* 32* 34.0*     --  129*  --  185    < > = values in this interval not displayed.     Recent Labs   Lab 04/26/19  0308 04/26/19  0807 04/26/19 2009 04/27/19 0308 04/27/19  0813   INR 1.2  --   --  1.2  --    APTT  --  25.2 24.9  --  25.7     Microbiology Results (last 7 days)     ** No results found for the last 168 hours. **        All pertinent labs from the last 24 hours have been reviewed.    Significant Diagnostics:  CT: Ct Head Without Contrast    Result Date: 4/25/2019  Evolving operative change from left temporal craniectomy and cranioplasty for left posterior temporal parenchymal hematoma evacuation. There is reduced postoperative pneumocephalus with small volumes of increased parenchymal hemorrhage within and about the resection cavity. Evolving mass effect and edema with continued 5-6 mm of rightward midline shift similar to prior. There  is continued small volume intraventricular hemorrhage with slight increase distension of the lateral ventricles concerning for component of evolving hydrocephalus. Continued diffuse effacement cerebral sulci at the vertex concerning for sequela of cerebral edema or evolving hydrocephalus Electronically signed by: Reggie Espinoza DO Date:    04/25/2019 Time:    15:50

## 2019-04-28 NOTE — PROGRESS NOTES
[facility]  Neurocritical Care  Progress Note    Admit Date: 4/22/2019  Service Date: 04/28/2019  Length of Stay: 6    Subjective:     Chief Complaint: Brown tumor    History of Present Illness: 27 yo F w/ PMH significant for liver transplant in 1991, recent thyroidectomy on 3/27/2019, ESRD on HD (MWF), and epilepsy who presents to [facility] for higher level of care following planned surgical excision of L calvarial lesion s/p excision and cranioplasty 4/22/19. The pt presented to [facility] on 03/12/2019 with a complaint of headaches, among other symptoms, and received a workup that included a CT head which showed a left temporal calvarium lesion with mass effect. At that time she shared that she continued to have a headache, noting the pain was usually over the left temporal aspect of her head but migrated over to the right temporal aspect. She states the pain on the right is exacerbated with palpation of the area. She has no additional symptomatic complaints at this time. Currently stable following surgery. Denies acute pain, otherwise comfortable w/ non-focal neurological exam.         Hospital Course: 4/22: Admitted to [facility]. Initiated cardene gtt to maintain SBP <140. Consulted hepatology and nephrology. Acute worsening of neuro exam. STAT CT revealed enlarging hematoma w/ midline shift. Urgently taken down to OR for evacuation. Return from OR intubated.   4/23: Neuro exam improved from previous. Somnolent but rousable. Following commands. Moving all extremities spontaneously; RUE 4/5. Pending CRRT vs HD today.  04/24/2019: no acute adverse events overnight  4/27: KUB, resume oral feeds, ionized Ca, HD over night successful, increase hydralazine, decrease HTS  4/28: D/C HTS, increase hydralazine, likely add clonidine today as well, wean cardene, suppository    Review of Symptoms:   Constitutional: Denies fevers or chills.  ENT: no hearing difficulty, no visual changes  Pulmonary: Denies shortness of  breath or cough.  Cardiology: Denies chest pain or palpitations.  GI: Denies abdominal pain or constipation.  Neurologic: Denies new weakness, headaches, or paresthesias.   : no dysuria  Musk: no muscle pain, no joint pain  Psych: no hallucinations     Physical Exam:  GA: comfortable, no acute distress.   HEENT: No scleral icterus or JVD.   Pulmonary: Clear to auscultation A/L. No wheezing, crackles, or rhonchi.  Cardiac: RRR S1 & S2 w/o rubs/murmurs/gallops.   Abdominal: Bowel sounds present x 4. No appreciable hepatosplenomegaly.  Skin: No jaundice, rashes, or visible lesions.  Pulses: 2+ dp Bilat     Neuro:  --sedation: none  --GCS: E4V5M6  --Mental Status: awake, oriented to self and place    --CN II-XII grossly intact.   --Pupils 3-->2mm, PERRL.   --brainstem: intact  --Motor: 4/5 throughout  --sensory: intact to soft touch and pain throughout  --Reflexes: not tested  --Gait: deferred    Recent Labs   Lab 04/28/19  0200   WBC 4.99   RBC 3.41*   HGB 9.1*   HCT 27.7*   *   MCV 81*   MCH 26.7*   MCHC 32.9     Recent Labs   Lab 04/28/19 0200 04/28/19  0708   CALCIUM 4.9*  --    PROT 6.9  --    * 137   K 5.0  --    CO2 18*  --      --    BUN 80*  --    CREATININE 7.8*  --    ALKPHOS 411*  --    ALT 7*  --    AST 24  --    BILITOT 0.5  --      Recent Labs   Lab 04/28/19 0200   INR 1.3*   APTT 26.4          Temp: 98.5 °F (36.9 °C)  Pulse: 75  Rhythm: normal sinus rhythm  BP: 132/68  MAP (mmHg): 94  Resp: (!) 22  SpO2: (!) 94 %  O2 Device (Oxygen Therapy): room air    Temp  Min: 97.7 °F (36.5 °C)  Max: 98.7 °F (37.1 °C)  Pulse  Min: 74  Max: 87  BP  Min: 109/57  Max: 153/73  MAP (mmHg)  Min: 78  Max: 105  Resp  Min: 12  Max: 24  SpO2  Min: 92 %  Max: 99 %    04/27 0701 - 04/28 0700  In: 2142.1 [P.O.:465; I.V.:1377.1]  Out: -    Unmeasured Output  Stool Occurrence: 0    Last Bowel Movement: 04/21/19    Body mass index is 22.1 kg/m².      I have personally reviewed all labs, imaging, and studies  today    Assessment/Plan:     Neuro  Cytotoxic brain edema  Goal euthermia  Goal eunatremia  Goal euvolemia  Goal euglycemia      Nontraumatic subcortical hemorrhage of left cerebral hemisphere  - Patient taken urgently to the OR following worsening neuro exam w/ CT demonstrating L temporal increased edema, hematoma, and midline shift  - S/p urgent L clot evacuation  - Post-op CT demonstrating:  Small volume residual blood products w/ post-operative air within L temporal lobe/surgical site. Persistent vasogenic edema w/ continued mass effect of 0.5 cm w/ rightward shift  - Improvement in neuro exam  - Continue to monitor w/ q1 hour neurochecks  - NSGY following    Brain compression  D/c HTS  Goal euthermia  Goal eunatremia  Goal euvolemia  Goal euglycemia    Follow Na    Seizures  - Continue home medications: Keppra 500 mg BID, Vimpat 50 mg BID    Psychiatric  Non compliance w medication regimen  Resuming home meds  Will need counseling on outpatient medication and diet compliance    Cardiac/Vascular  Renovascular hypertension  - On Verapamil, Irbasartan, Hydralazine, Coreg, Clonidine at home   home Coreg 25 mg BID  Hydralazine up to 100 today  Add clonidine      Renal/  Hypocalcemia  - Corrected Ca 6.6  - Calcitriol 0.5 mcg BID  - Calcium carbonate 2000 mg TID    ESRD on hemodialysis  - ESRD w/ HD on MWF at home  - appreciate nephrology help  - avoid BUN elevation please for prolonged periods, HD if BUN at 75-80    Immunology/Multi System  Immunosuppressed  - Stable  - See above    Hematology  Thrombocytopenia  Monitor PLTs  - No active signs of bleeding      Oncology  Brain tumor  Post op skull lesion resection  NSGY following  SBP control  Goal euthermia  Goal eunatremia  Goal euvolemia  Goal euglycemia      Endocrine  Moderate protein-calorie malnutrition  ADAT    Secondary hyperparathyroidism  Nephrology recs    GI  Liver replaced by transplant  - S/p liver transplant 1991  - Consulted hepatology; appreciate  assistance  - Daily tacro level  - tacrolimus dosing per hepatology          The patient is being Prophylaxed for:  Venous Thromboembolism with: Mechanical  Stress Ulcer with: Not Applicable   Ventilator Pneumonia with: not applicable    Activity Orders          Diet renal [facility]; Dysphagia (Mechanical Soft): Renal starting at 04/27 1116    Discontinue arterial line starting at 04/25 1021        Full Code    Matthew Larson MD  Neurocritical Care  [facility] time 33 minutes  Critical Care was time spent personally by me on the following activities: development of treatment plan with patient or surrogate and bedside caregivers, discussions with consultants, evaluation of patient's response to treatment, examination of patient, ordering and performing treatments and interventions, ordering and review of laboratory studies, ordering and review of radiographic studies, pulse oximetry, re-evaluation of patient's condition. This critical care time did not overlap with that of any other provider or involve time for any procedures.

## 2019-04-28 NOTE — PLAN OF CARE
Problem: Adult Inpatient Plan of Care  Goal: Plan of Care Review  Outcome: Revised  POC reviewed with pt and family at 1400. Pt and mother verbalized understanding.Pt able to articulate at times in full sentences and then other times answers with inappropriate words and appears not to understand directions given. Questions and concerns addressed. No acute events today. Pt progressing toward goals. Will continue to monitor. See flowsheets for full assessment and VS info. Try to wean off cardene and hopefully transfer to stepdown tomorrow.

## 2019-04-28 NOTE — PLAN OF CARE
Problem: Physical Therapy Goal  Goal: Physical Therapy Goal  Goals to be met by: 7 days ()    Patient will increase functional independence with mobility by performin. Supine to sit with Set-up Murray  2. Sit to supine with Set-up Murray  3. Sit to stand transfer with Supervision  4. Gait  x 200 feet with Stand By Assistance using no assistive device  5. Lower extremity exercise program x30 reps per handout, with independence to improve muscular strength and endurance.       Outcome: Ongoing (interventions implemented as appropriate)  PT evaluation completed. POC initiated.    Shama Gant, PT  2019

## 2019-04-28 NOTE — PROGRESS NOTES
[facility]  Neurosurgery  Progress Note    Subjective:     History of Present Illness: No notes on file    Post-Op Info:  Procedure(s) (LRB):  CRANIOTOMY-- left crani for clot evac with microscrope (Left)   5 Days Post-Op         Medications:  Continuous Infusions:   nicardipine 5 mg/hr (04/27/19 1905)    Sodium Chloride 2% 30 mL/hr at 04/27/19 1905     Scheduled Meds:   calcitriol  0.5 mcg Oral BID    calcium carbonate  2,000 mg Oral TID    carvedilol  25 mg Oral BID    dexamethasone  4 mg Intravenous Q6H    [START ON 4/28/2019] famotidine  20 mg Oral Daily    hydrALAZINE  75 mg Oral Q8H    lacosamide  50 mg Oral BID    levETIRAcetam  500 mg Oral BID    [START ON 4/28/2019] polyethylene glycol  17 g Oral Daily    [START ON 4/28/2019] senna-docusate 8.6-50 mg  2 tablet Oral Daily    [START ON 4/28/2019] tacrolimus  3 mg Oral Daily    tacrolimus  3 mg Oral Daily     PRN Meds:dextrose 50%, glucagon (human recombinant), hydrALAZINE, insulin aspart U-100, labetalol, midazolam, ondansetron, oxyCODONE, sodium chloride 0.9%     Review of Systems     Unable to aphasia   Objective:     Weight: 54.8 kg (120 lb 13 oz)  Body mass index is 22.1 kg/m².  Vital Signs (Most Recent):  Temp: 98 °F (36.7 °C) (04/27/19 1905)  Pulse: 81 (04/27/19 1935)  Resp: 20 (04/27/19 1935)  BP: 132/65 (04/27/19 1935)  SpO2: 98 % (04/27/19 1935) Vital Signs (24h Range):  Temp:  [97.7 °F (36.5 °C)-98.4 °F (36.9 °C)] 98 °F (36.7 °C)  Pulse:  [72-90] 81  Resp:  [12-23] 20  SpO2:  [94 %-99 %] 98 %  BP: (108-146)/(56-84) 132/65  Arterial Line BP: (118-157)/(61-77) 131/69     Date 04/27/19 0700 - 04/28/19 0659   Shift 2834-4746 5701-4469 9370-7184 24 Hour Total   INTAKE   P.O. 225 180  405   I.V.(mL/kg) 713.3(13) 272.1(5)  985.4(18)   Other 100   100   Shift Total(mL/kg) 1038.3(18.9) 452.1(8.2)  1490.4(27.2)   OUTPUT   Shift Total(mL/kg)       Weight (kg) 54.8 54.8 54.8 54.8                        Hemodialysis AV Fistula  Left forearm (Active)   Needle Size 15ga 4/25/2019  1:20 PM   Site Assessment Clean;Dry;Intact 4/25/2019  1:20 PM   Patency Present;Thrill;Bruit 4/25/2019  1:20 PM   Status Accessed 4/25/2019  1:20 PM   Flows Good 4/25/2019  3:05 AM   Dressing Intervention Dressing reinforced 4/25/2019  3:05 AM   Dressing Status Clean;Intact;Dry 4/25/2019 11:01 AM   Site Condition No complications 4/25/2019 11:01 AM   Dressing Occlusive 4/25/2019 11:01 AM   Drainage Description Other (Comment) 4/14/2018  5:26 PM       Neurosurgery Physical Exam  E4V4M6    aox1 name  PERRLA   Partial transcortical motor aphasia.  Move L side and RLE AG, RUE paresis with minimal WD  Wound c/d/i        Significant Labs:  Recent Labs   Lab 04/26/19  0308 04/27/19  0308 04/27/19  0423 04/27/19  0813 04/27/19  1405   *   < > 142* 134* 139*  --    *   < > 134* 134* 135* 136   K 5.3*   < > 4.2 4.3 4.3  --    CL 97   < > 97 97 99  --    CO2 18*   < > 19* 18* 20*  --    BUN 66*   < > 65* 57* 63*  --    CREATININE 7.9*   < > 7.2* 6.7* 7.1*  --    CALCIUM 6.0*   < > 7.3* 7.5* 6.3*  --    MG 2.2  --   --   --   --   --     < > = values in this interval not displayed.     Recent Labs   Lab 04/26/19  0807 04/26/19  1638 04/26/19 2205 04/27/19 0813   WBC 8.79  --  6.10  --  7.95   HGB 10.9*  --  10.0*  --  11.3*   HCT 33.3*   < > 30.5* 32* 34.0*     --  129*  --  185    < > = values in this interval not displayed.     Recent Labs   Lab 04/26/19  0308 04/26/19  0807 04/26/19 2009 04/27/19  0308 04/27/19  0813   INR 1.2  --   --  1.2  --    APTT  --  25.2 24.9  --  25.7     Microbiology Results (last 7 days)     ** No results found for the last 168 hours. **        All pertinent labs from the last 24 hours have been reviewed.    Significant Diagnostics:  CT: Ct Head Without Contrast    Result Date: 4/25/2019  Evolving operative change from left temporal craniectomy and cranioplasty for left posterior temporal parenchymal hematoma evacuation.  There is reduced postoperative pneumocephalus with small volumes of increased parenchymal hemorrhage within and about the resection cavity. Evolving mass effect and edema with continued 5-6 mm of rightward midline shift similar to prior. There is continued small volume intraventricular hemorrhage with slight increase distension of the lateral ventricles concerning for component of evolving hydrocephalus. 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 Follow up scan with more punctuate ICH and perilesional edema as well as IV after severe hypertensive episode.     Pt following commands on left. Partial transcortical motor aphasia. Paretic on right. Oriented to name.    --Continue care per primary team.  --Continue levetiracetam 500 bid.  --Continue lacosamide 50 bid.  --Continue hourly neuromonitoring.  --Continue strict blood pressure control.  --We will continue to monitor closely, please contact us with any questions or concerns.                       Tian Whitlock MD  Neurosurgery  [facility]

## 2019-04-28 NOTE — PLAN OF CARE
Problem: Adult Inpatient Plan of Care  Goal: Plan of Care Review  Outcome: Ongoing (interventions implemented as appropriate)  POC reviewed with pt at 0500. Pt verbalized understanding, but needs reinforcement. Questions and concerns addressed.  No acute events overnight. 2% gtt decreased to 15mL/hr per NP order. 2g calcium gluconate given for critical calcium per MD order. Pt progressing toward goals. Will continue to monitor. See flowsheets for full assessment and VS info

## 2019-04-28 NOTE — TREATMENT PLAN
Hepatology brief note    Tacrolimus level today 7.1, supratherapeutic  CMP and INR stable    Reduce dose of tacrolimus to 2mg BID.  Trend daily CBC, CMP, INR, Tacrolimus level.

## 2019-04-28 NOTE — ASSESSMENT & PLAN NOTE
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 Follow up scan with more punctuate ICH and perilesional edema as well as IV after severe hypertensive episode.     Pt following commands on left. Partial transcortical motor aphasia. Paretic on right. Oriented to name.    --Continue care per primary team.  --Continue levetiracetam 500 bid.  --Continue lacosamide 50 bid.  --Continue hourly neuromonitoring.  --Continue strict blood pressure control.  --We will continue to monitor closely, please contact us with any questions or concerns.

## 2019-04-28 NOTE — ASSESSMENT & PLAN NOTE
- ESRD w/ HD on MWF at home  - appreciate nephrology help  - avoid BUN elevation please for prolonged periods, HD if BUN at 75-80

## 2019-04-28 NOTE — ASSESSMENT & PLAN NOTE
- On Verapamil, Irbasartan, Hydralazine, Coreg, Clonidine at home   home Coreg 25 mg BID  Hydralazine up to 100 today  Add clonidine

## 2019-04-29 NOTE — SUBJECTIVE & OBJECTIVE
NAEON. Still on Cardene. Exam improving. No issues per nursing.     Medications:  Continuous Infusions:   nicardipine 15 mg/hr (04/29/19 0800)     Scheduled Meds:   bisacodyl  10 mg Rectal Daily    calcitriol  0.5 mcg Oral BID    calcium carbonate  2,000 mg Oral TID    carvedilol  25 mg Oral BID    cloNIDine  0.1 mg Oral TID    famotidine  20 mg Oral Daily    hydrALAZINE  100 mg Oral Q8H    lacosamide  50 mg Oral BID    levETIRAcetam  500 mg Oral BID    polyethylene glycol  17 g Oral Daily    senna-docusate 8.6-50 mg  2 tablet Oral Daily    tacrolimus  2 mg Oral Daily    tacrolimus  2 mg Oral Daily     PRN Meds:dextrose 50%, glucagon (human recombinant), hydrALAZINE, insulin aspart U-100, labetalol, midazolam, ondansetron, oxyCODONE, sodium chloride 0.9%     Review of Systems     Unable to aphasia   Objective:     Weight: 54.8 kg (120 lb 13 oz)  Body mass index is 22.1 kg/m².  Vital Signs (Most Recent):  Temp: 97.6 °F (36.4 °C) (04/29/19 0700)  Pulse: 74 (04/29/19 0800)  Resp: (!) 21 (04/29/19 0800)  BP: (!) 151/68 (04/29/19 0800)  SpO2: 96 % (04/29/19 0800) Vital Signs (24h Range):  Temp:  [97.6 °F (36.4 °C)-98.8 °F (37.1 °C)] 97.6 °F (36.4 °C)  Pulse:  [66-86] 74  Resp:  [16-26] 21  SpO2:  [93 %-97 %] 96 %  BP: (115-164)/(55-93) 151/68  Arterial Line BP: (100-148)/(49-77) 143/74     Date 04/29/19 0700 - 04/30/19 0659   Shift 4182-2942 6390-2526 3540-4700 24 Hour Total   INTAKE   P.O. 100   100   Shift Total(mL/kg) 100(1.8)   100(1.8)   OUTPUT   Shift Total(mL/kg)       Weight (kg) 54.8 54.8 54.8 54.8                        Hemodialysis AV Fistula Left forearm (Active)   Needle Size 15ga 4/25/2019  1:20 PM   Site Assessment Clean;Dry;Intact 4/25/2019  1:20 PM   Patency Present;Thrill;Bruit 4/25/2019  1:20 PM   Status Accessed 4/25/2019  1:20 PM   Flows Good 4/25/2019  3:05 AM   Dressing Intervention Dressing reinforced 4/25/2019  3:05 AM   Dressing Status Clean;Intact;Dry 4/25/2019 11:01 AM   Site  Condition No complications 4/25/2019 11:01 AM   Dressing Occlusive 4/25/2019 11:01 AM   Drainage Description Other (Comment) 4/14/2018  5:26 PM       Neurosurgery Physical Exam  E4V4M6    aox1 name  SHRAAVN   Partial transcortical motor aphasia  Move L side and RLE AG, RUE paresis with minimal WD and some effort against gravity   Wound c/d/i        Significant Labs:  Recent Labs   Lab 04/28/19  0200  04/28/19  1600 04/28/19  2205 04/29/19  0232   *  --  204* 161* 144*   *   < > 133* 132* 133*   K 5.0  --  5.1 5.3* 5.8*     --  99 100 100   CO2 18*  --  17* 16* 17*   BUN 80*  --  88* 89* 90*   CREATININE 7.8*  --  8.2* 8.3* 8.7*   CALCIUM 4.9*  --  5.0* 5.0* 5.3*   MG 2.3  --   --   --   --     < > = values in this interval not displayed.     Recent Labs   Lab 04/28/19  0200 04/28/19 2207 04/29/19  0232   WBC 4.99  --  4.79   HGB 9.1*  --  8.9*   HCT 27.7* 31* 26.5*   *  --  124*     Recent Labs   Lab 04/28/19  0200 04/29/19  0232   INR 1.3*  --    APTT 26.4 27.1     Microbiology Results (last 7 days)     ** No results found for the last 168 hours. **        All pertinent labs from the last 24 hours have been reviewed.    Significant Diagnostics:  CT: Ct Head Without Contrast    Result Date: 4/25/2019  Evolving operative change from left temporal craniectomy and cranioplasty for left posterior temporal parenchymal hematoma evacuation. There is reduced postoperative pneumocephalus with small volumes of increased parenchymal hemorrhage within and about the resection cavity. Evolving mass effect and edema with continued 5-6 mm of rightward midline shift similar to prior. There is continued small volume intraventricular hemorrhage with slight increase distension of the lateral ventricles concerning for component of evolving hydrocephalus. Continued diffuse effacement cerebral sulci at the vertex concerning for sequela of cerebral edema or evolving hydrocephalus Electronically signed by: Reggie  DO Alexis Date:    04/25/2019 Time:    15:50

## 2019-04-29 NOTE — ASSESSMENT & PLAN NOTE
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 Follow up scan with more punctuate ICH and perilesional edema as well as IV after severe hypertensive episode.     No acute events overnight. Pt with improving aphasia.    --Continue care per primary team.  --Continue levetiracetam 500 bid.  --Continue lacosamide 50 bid.  --Continue hourly neuromonitoring.  --Continue strict blood pressure control.  --We will continue to monitor closely, please contact us with any questions or concerns.

## 2019-04-29 NOTE — ASSESSMENT & PLAN NOTE
On Verapamil, Irbesartan, Hydralazine, Coreg, Clonidine at home    - target sBP <140  - continued on hydralazine 100 mg q8, carvedilol 25 mg q12  - added clonidine 0.1 mg q8 (4/28), increased to 0.2 mg q8 hr  - on cardene gtt up until this morning, was able to wean off of the drip   - D/Fabian A-line

## 2019-04-29 NOTE — PLAN OF CARE
04/29/19 1406   Discharge Reassessment   Assessment Type Discharge Planning Reassessment   Provided patient/caregiver education on the expected discharge date and the discharge plan Yes   Do you have any problems affording any of your prescribed medications? No   Discharge Plan A Rehab   Discharge Plan B Home with family   DME Needed Upon Discharge  other (see comments)  (tbd)   Patient choice form signed by patient/caregiver N/A   Anticipated Discharge Disposition Rehab   Can the patient answer the patient profile reliably? No, cognitively impaired   How does the patient rate their overall health at the present time?   (tabatha)   Describe the patient's ability to walk at the present time. No restrictions   How often would a person be available to care for the patient? Often   Number of comorbid conditions (as recorded on the chart) Five or more   During the past month, has the patient often been bothered by feeling down, depressed or hopeless? No   During the past month, has the patient often been bothered by little interest or pleasure in doing things? No   Post-Acute Status   Post-Acute Authorization Placement   Post-Acute Placement Status Referrals Sent   Discharge Delays None known at this time       Delma Mercado RN, CCRN-K, [facility]  Neuro-Critical Care   [phone]

## 2019-04-29 NOTE — SUBJECTIVE & OBJECTIVE
Interval History: Last HD on 4/27, UF 2 L. Treatment tolerated well according to note. KEVEN. Patient responsive during assessment.   Potassium 5.8 on AM labs.   CoCa 6.58    Review of patient's allergies indicates:   Allergen Reactions    Chloral hydrate Hallucinations     Other reaction(s): Hallucinations  Other reaction(s): Hives    Hydrocodone Other (See Comments)     Mental status changes     Current Facility-Administered Medications   Medication Frequency    0.9%  NaCl infusion Once    bisacodyl suppository 10 mg Daily    calcitriol solution 0.5 mcg BID    calcium carbonate 500 mg/5 mL (1,250 mg/5 mL) suspension 2,000 mg TID    carvedilol tablet 25 mg BID    cloNIDine tablet 0.2 mg Q8H    dextrose 50% injection 12.5 g PRN    famotidine tablet 20 mg Daily    glucagon (human recombinant) injection 1 mg PRN    hydrALAZINE injection 10 mg Q4H PRN    hydrALAZINE tablet 100 mg Q8H    insulin aspart U-100 pen 1-10 Units Q6H PRN    labetalol 20 mg/4 mL (5 mg/mL) IV syring Q4H PRN    lacosamide tablet 50 mg BID    levETIRAcetam tablet 500 mg BID    midazolam (VERSED) 1 mg/mL injection 2 mg On Call Procedure    niCARdipine 40 mg/200 mL infusion Continuous    ondansetron disintegrating tablet 4 mg Q6H PRN    oxyCODONE immediate release tablet 5 mg Q6H PRN    polyethylene glycol packet 17 g Daily    senna-docusate 8.6-50 mg per tablet 2 tablet Daily    sodium chloride (23.4%) 4 mEq/mL 172 mEq in sterile water 500 mL (sodium chloride 2%) infusion Continuous    sodium chloride 0.9% flush 10 mL PRN    [START ON 4/30/2019] tacrolimus (PROGRAF) 1 mg/mL oral syringe Daily    tacrolimus (PROGRAF) 1 mg/mL oral syringe Daily       Objective:     Vital Signs (Most Recent):  Temp: 97.6 °F (36.4 °C) (04/29/19 1100)  Pulse: 69 (04/29/19 1400)  Resp: 18 (04/29/19 1400)  BP: (!) 151/68 (04/29/19 0800)  SpO2: 99 % (04/29/19 1400)  O2 Device (Oxygen Therapy): nasal cannula (04/29/19 1400) Vital Signs (24h  Range):  Temp:  [97.6 °F (36.4 °C)-98.8 °F (37.1 °C)] 97.6 °F (36.4 °C)  Pulse:  [65-85] 69  Resp:  [16-26] 18  SpO2:  [92 %-99 %] 99 %  BP: (118-164)/(55-93) 151/68  Arterial Line BP: (100-148)/(49-78) 140/78     Weight: 54.8 kg (120 lb 13 oz) (04/22/19 1100)  Body mass index is 22.1 kg/m².  Body surface area is 1.55 meters squared.    I/O last 3 completed shifts:  In: 2544.8 [P.O.:780; I.V.:1364.8; IV Piggyback:400]  Out: -     Physical Exam   Constitutional: She appears well-developed. She is sleeping. She is easily aroused. No distress.   HENT:   Right Ear: External ear normal.   Left Ear: External ear normal.   Eyes: Conjunctivae and EOM are normal. Right eye exhibits no discharge. Left eye exhibits no discharge.   Neck: Normal range of motion. Neck supple.   Cardiovascular: Normal rate and regular rhythm. Exam reveals no gallop and no friction rub.   No murmur heard.  Pulmonary/Chest: Effort normal and breath sounds normal. No respiratory distress. She has no wheezes. She has no rales.   Abdominal: Soft. Bowel sounds are normal. She exhibits no distension. There is no tenderness.   Musculoskeletal: Normal range of motion. She exhibits no edema or deformity.   Neurological: She is easily aroused.   Opens eyes to voice/follow commands   Skin: Skin is warm and dry. She is not diaphoretic.   Psychiatric: She has a normal mood and affect. Her behavior is normal.     Significant Labs:  CBC:   Recent Labs   Lab 04/29/19  0232   WBC 4.79   RBC 3.27*   HGB 8.9*   HCT 26.5*   *   MCV 81*   MCH 27.2   MCHC 33.6     CMP:   Recent Labs   Lab 04/29/19  0232 04/29/19  1126   *  --    CALCIUM 5.3*  --    ALBUMIN 2.4*  --    PROT 6.6  --    *  --    K 5.8* 4.6   CO2 17*  --      --    BUN 90*  --    CREATININE 8.7*  --    ALKPHOS 376*  --    ALT 7*  --    AST 31  --    BILITOT 0.4  --

## 2019-04-29 NOTE — ASSESSMENT & PLAN NOTE
S/p urgent left crani for clot evacuation    - Continue to monitor w/ q1 hour neurochecks  - continue levetiracetam 500 mg q12  - NSGY following

## 2019-04-29 NOTE — PROGRESS NOTES
[facility]  Nephrology  Progress Note    Patient Name: Holly Patel  MRN: [id number]  Admission Date: 4/22/2019  Hospital Length of Stay: 7 days  Attending Provider: Karson Bermeo MD   Primary Care Physician: Stan Sosa MD  Principal Problem:Brown tumor    Subjective:     HPI: Ms. Holly Patel is a 29 yo AAF with Tertiary hyperparathyroidism s/p partial parathyroidectomy, HTN, s/p OLTx in 2012, and ESRD on iHD TTS, and known history of non-compliance with medication regimen who is now s/p surgical excision of L calvarial lesion s/p excision and cranioplasty 4/22/19.  The pt [place] [facility] [place] with a complaint of headaches, among other symptoms, and received a workup that included a CTH which showed a left temporal calvarium lesion with mass effect.  She continued to suffer from headaches and NSGY scheduled patient for excision and cranioplasy.  She tolerated surgery well and was transferred to the [facility] for further monitoring.  Nephrology has been consulted for ESRD management while in-patient.     She normally  dialyzes at [facility] on MWF under the care of Dr. Bass.  She dialyzes for 3.5 hours and reports an EDW ~ 50 kg.  She has an AVF to her [facility].  She no longer makes urine.  Her last HD treatment was on Saturday (4/20).          Interval History: Last HD on 4/27, UF 2 L. Treatment tolerated well according to note. NAEON. Patient responsive during assessment.   Potassium 5.8 on AM labs.   CoCa 6.58    Review of patient's allergies indicates:   Allergen Reactions    Chloral hydrate Hallucinations     Other reaction(s): Hallucinations  Other reaction(s): Hives    Hydrocodone Other (See Comments)     Mental status changes     Current Facility-Administered Medications   Medication Frequency    0.9%  NaCl infusion Once    bisacodyl suppository 10 mg Daily    calcitriol solution 0.5 mcg BID    calcium carbonate 500 mg/5 mL (1,250 mg/5 mL) suspension 2,000 mg  TID    carvedilol tablet 25 mg BID    cloNIDine tablet 0.2 mg Q8H    dextrose 50% injection 12.5 g PRN    famotidine tablet 20 mg Daily    glucagon (human recombinant) injection 1 mg PRN    hydrALAZINE injection 10 mg Q4H PRN    hydrALAZINE tablet 100 mg Q8H    insulin aspart U-100 pen 1-10 Units Q6H PRN    labetalol 20 mg/4 mL (5 mg/mL) IV syring Q4H PRN    lacosamide tablet 50 mg BID    levETIRAcetam tablet 500 mg BID    midazolam (VERSED) 1 mg/mL injection 2 mg On Call Procedure    niCARdipine 40 mg/200 mL infusion Continuous    ondansetron disintegrating tablet 4 mg Q6H PRN    oxyCODONE immediate release tablet 5 mg Q6H PRN    polyethylene glycol packet 17 g Daily    senna-docusate 8.6-50 mg per tablet 2 tablet Daily    sodium chloride (23.4%) 4 mEq/mL 172 mEq in sterile water 500 mL (sodium chloride 2%) infusion Continuous    sodium chloride 0.9% flush 10 mL PRN    [START ON 4/30/2019] tacrolimus (PROGRAF) 1 mg/mL oral syringe Daily    tacrolimus (PROGRAF) 1 mg/mL oral syringe Daily       Objective:     Vital Signs (Most Recent):  Temp: 97.6 °F (36.4 °C) (04/29/19 1100)  Pulse: 69 (04/29/19 1400)  Resp: 18 (04/29/19 1400)  BP: (!) 151/68 (04/29/19 0800)  SpO2: 99 % (04/29/19 1400)  O2 Device (Oxygen Therapy): nasal cannula (04/29/19 1400) Vital Signs (24h Range):  Temp:  [97.6 °F (36.4 °C)-98.8 °F (37.1 °C)] 97.6 °F (36.4 °C)  Pulse:  [65-85] 69  Resp:  [16-26] 18  SpO2:  [92 %-99 %] 99 %  BP: (118-164)/(55-93) 151/68  Arterial Line BP: (100-148)/(49-78) 140/78     Weight: 54.8 kg (120 lb 13 oz) (04/22/19 1100)  Body mass index is 22.1 kg/m².  Body surface area is 1.55 meters squared.    I/O last 3 completed shifts:  In: 2544.8 [P.O.:780; I.V.:1364.8; IV Piggyback:400]  Out: -     Physical Exam   Constitutional: She appears well-developed. She is sleeping. She is easily aroused. No distress.   HENT:   Right Ear: External ear normal.   Left Ear: External ear normal.   Eyes: Conjunctivae and  EOM are normal. Right eye exhibits no discharge. Left eye exhibits no discharge.   Neck: Normal range of motion. Neck supple.   Cardiovascular: Normal rate and regular rhythm. Exam reveals no gallop and no friction rub.   No murmur heard.  Pulmonary/Chest: Effort normal and breath sounds normal. No respiratory distress. She has no wheezes. She has no rales.   Abdominal: Soft. Bowel sounds are normal. She exhibits no distension. There is no tenderness.   Musculoskeletal: Normal range of motion. She exhibits no edema or deformity.   Neurological: She is easily aroused.   Opens eyes to voice/follow commands   Skin: Skin is warm and dry. She is not diaphoretic.   Psychiatric: She has a normal mood and affect. Her behavior is normal.     Significant Labs:  CBC:   Recent Labs   Lab 04/29/19 0232   WBC 4.79   RBC 3.27*   HGB 8.9*   HCT 26.5*   *   MCV 81*   MCH 27.2   MCHC 33.6     CMP:   Recent Labs   Lab 04/29/19 0232 04/29/19  1126   *  --    CALCIUM 5.3*  --    ALBUMIN 2.4*  --    PROT 6.6  --    *  --    K 5.8* 4.6   CO2 17*  --      --    BUN 90*  --    CREATININE 8.7*  --    ALKPHOS 376*  --    ALT 7*  --    AST 31  --    BILITOT 0.4  --             Assessment/Plan:     * Brown tumor  - Being follow by admitting service     ESRD on hemodialysis  ESRD on iHD TTS  [facility]  Dr. Bass  3.5 hours  EDW ~ 50 kg  LFA AVF    Plan/Recommendations:  -Will provide dialysis today for metabolic clearance and volume management  -Will dialyze using low flow HD   -Target UF 1-1.5 L as tolerated, keep MAP >65  -Calcium Gluconate 3 gm IVPB x 1 now  -continue strict I/O's  -daily renal panel with phos added      Case discussed with staff further recs with attestation.     I will follow-up with patient. Please contact us if you have any additional questions.    TED Russell DNP, APRN, FNP-C  Department of Nephrology  [facility]  Pager: [phone]

## 2019-04-29 NOTE — PROGRESS NOTES
[facility]  Neurocritical Care  Progress Note    Admit Date: 4/22/2019  Service Date: 04/29/2019  Length of Stay: 7    Subjective:     Chief Complaint: Nontraumatic subcortical hemorrhage of left cerebral hemisphere    History of Present Illness: 27 yo F w/ PMH significant for liver transplant in 1991, recent thyroidectomy on 3/27/2019, ESRD on HD (MWF), and epilepsy who presents to [facility] for higher level of care following planned surgical excision of L calvarial lesion s/p excision and cranioplasty 4/22/19. The pt presented to [facility] on 03/12/2019 with a complaint of headaches, among other symptoms, and received a workup that included a CT head which showed a left temporal calvarium lesion with mass effect. At that time she shared that she continued to have a headache, noting the pain was usually over the left temporal aspect of her head but migrated over to the right temporal aspect. She states the pain on the right is exacerbated with palpation of the area. She has no additional symptomatic complaints at this time. Currently stable following surgery. Denies acute pain, otherwise comfortable w/ non-focal neurological exam.       Hospital Course: 4/22: Admitted to [facility]. Initiated cardene gtt to maintain SBP <140. Consulted hepatology and nephrology. Acute worsening of neuro exam. STAT CT revealed enlarging hematoma w/ midline shift. Urgently taken down to OR for evacuation. Return from OR intubated.   4/23: Neuro exam improved from previous. Somnolent but rousable. Following commands. Moving all extremities spontaneously; RUE 4/5. Pending CRRT vs HD today.  04/24/2019: no acute adverse events overnight  4/27: KUB, resume oral feeds, ionized Ca, HD over night successful, increase hydralazine, decrease HTS  4/28: D/C HTS, increase hydralazine, likely add clonidine today as well, wean cardene, suppository  4/29: slight improvement in neuro exam, following commands. Started on HTS 2%. Increased  clonidine and weaned off of cardene this AM, D/Fabian A-line. INR remains elevated. Shifted potassium. nephro with plans for slow HD.     Interval History:  Please see hospital course    Review of Systems   Constitutional: Negative for fever.   HENT: Negative for drooling and trouble swallowing.    Respiratory: Negative for choking and shortness of breath.    Gastrointestinal: Negative for nausea and vomiting.   Allergic/Immunologic: Positive for immunocompromised state.   Neurological: Positive for speech difficulty and weakness. Negative for seizures.   Psychiatric/Behavioral: Positive for confusion. Negative for agitation.       Objective:     Vitals:  Temp: 97.8 °F (36.6 °C)  Pulse: 63  Rhythm: normal sinus rhythm  BP: (!) 151/68  MAP (mmHg): 97  Resp: 19  SpO2: 99 %  O2 Device (Oxygen Therapy): nasal cannula    Temp  Min: 97.6 °F (36.4 °C)  Max: 98.8 °F (37.1 °C)  Pulse  Min: 63  Max: 85  BP  Min: 118/55  Max: 164/87  MAP (mmHg)  Min: 79  Max: 119  Resp  Min: 15  Max: 24  SpO2  Min: 92 %  Max: 100 %    04/28 0701 - 04/29 0700  In: 1833.6 [P.O.:720; I.V.:913.6]  Out: -    Unmeasured Output  Stool Occurrence: 1       Physical Exam   Constitutional: She appears well-developed. She appears lethargic. She is cooperative. She appears ill. No distress.   HENT:   Head: Normocephalic.   Eyes: Pupils are equal, round, and reactive to light.   Neck: Normal range of motion.   Cardiovascular: Normal rate and regular rhythm.   Pulmonary/Chest: Effort normal. No tachypnea. No respiratory distress.   Abdominal: Soft. She exhibits no distension.   Musculoskeletal: Normal range of motion. She exhibits no edema.   Neurological: She appears lethargic. She displays no tremor. She displays no seizure activity.   Lethargic, not fully cooperative  Follows simple commands  Moves all 4 extremities  PERRL, brisk     Skin: Skin is warm. She is not diaphoretic.   Vitals reviewed.    Unable to test orientation, coordination, gait due to level  of consciousness.    Medications:  Continuous  nicardipine Last Rate: Stopped (04/29/19 1000)   Sodium Chloride 2% Last Rate: 50 mL/hr at 04/29/19 1500   Scheduled  sodium chloride 0.9%  Once   bisacodyl 10 mg Daily   calcitriol 0.5 mcg BID   calcium carbonate 2,000 mg TID   carvedilol 25 mg BID   cloNIDine 0.2 mg Q8H   famotidine 20 mg Daily   hydrALAZINE 100 mg Q8H   lacosamide 50 mg BID   levETIRAcetam 500 mg BID   polyethylene glycol 17 g Daily   senna-docusate 8.6-50 mg 2 tablet Daily   [START ON 4/30/2019] tacrolimus 3 mg Daily   tacrolimus 3 mg Daily   PRN  dextrose 50% 12.5 g PRN   glucagon (human recombinant) 1 mg PRN   hydrALAZINE 10 mg Q4H PRN   insulin aspart U-100 1-10 Units Q6H PRN   labetalol 10 mg Q4H PRN   midazolam 2 mg On Call Procedure   ondansetron 4 mg Q6H PRN   oxyCODONE 5 mg Q6H PRN   sodium chloride 0.9% 10 mL PRN     Today I personally reviewed pertinent medications, lines/drains/airways, imaging, cardiology results, laboratory results, microbiology results, notably:    Recent Labs   Lab 04/29/19  0232   WBC 4.79   RBC 3.27*   HGB 8.9*   HCT 26.5*   *   MCV 81*   MCH 27.2   MCHC 33.6     Recent Labs   Lab 04/29/19  0232  04/29/19  1504   CALCIUM 5.3*  --  5.0*   PROT 6.6  --   --    *  --  132*   K 5.8*   < > 4.9   CO2 17*  --  17*     --  100   BUN 90*  --  99*   CREATININE 8.7*  --  8.9*   ALKPHOS 376*  --   --    ALT 7*  --   --    AST 31  --   --    BILITOT 0.4  --   --     < > = values in this interval not displayed.       Diet  Diet renal [facility]; Dysphagia (Mechanical Soft)    Assessment/Plan:     Neuro  * Nontraumatic subcortical hemorrhage of left cerebral hemisphere  S/p urgent left crani for clot evacuation    - Continue to monitor w/ q1 hour neurochecks  - continue levetiracetam 500 mg q12  - NSGY following    Seizures  - Continue home medications: Keppra 500 mg BID, Vimpat 50 mg BID    Cardiac/Vascular  Renovascular hypertension  On Verapamil,  Irbesartan, Hydralazine, Coreg, Clonidine at home    - target sBP <140  - continued on hydralazine 100 mg q8, carvedilol 25 mg q12  - added clonidine 0.1 mg q8 (4/28), increased to 0.2 mg q8 hr  - on cardene gtt up until this morning, was able to wean off of the drip   - D/Fabian A-line    Renal/  ESRD on hemodialysis  ESRD w/ HD on MWF as outpatient  AVF in [facility]  Pertinent labs (4/29); BUN/Cr 90/8.7, K 5.8)    - kayexalate + insulin/D50 -> repeat k 4.9  - nephrology following, appreciate their assistance  - plans for low flow HD today  - monitor BMP q8 hr    Hematology  Thrombocytopenia  Plt 124k this morning  No active bleeding    Endocrine  Secondary hyperparathyroidism  Nephrology following    GI  Liver replaced by transplant  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    - level from today 2.4 (subtherapeutic)  - hepatology following and adjusting the dose  - increased tacrolimus from 2 to 3 mg twice daily      The patient is being Prophylaxed for:  Venous Thromboembolism with: Mechanical  Stress Ulcer with: H2B  Ventilator Pneumonia with: not applicable    Activity Orders          Discontinue arterial line starting at 04/29 1512    Diet renal [facility]; Dysphagia (Mechanical Soft): Renal starting at 04/27 1116    Discontinue arterial line starting at 04/25 1021        Full Code    Beth Smalls MD  Neurocritical Care  [facility]

## 2019-04-29 NOTE — ASSESSMENT & PLAN NOTE
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 Follow up scan with more punctuate ICH and perilesional edema as well as IV after severe hypertensive episode.       POD#7           Continue ICU care           Follow up scan stable               Continue neurochecks q1h,  improving aphasia  HOB>30  Keppra and lacosamide   Na >135  HDS, keep SBP <140, now on Cardene   Sating well at RA  AF and WBC WNL and H/H stable  Daily PT/OT  DVTx: SCD/TCD and start SQH  Further management of care per NCC  Stepdown to NSGY pending BP control and weaning Cardene   Will continue to monitor. Please page NSGY with any changes in exam

## 2019-04-29 NOTE — PROGRESS NOTES
[facility]  Neurosurgery  Progress Note    Subjective:     History of Present Illness: No notes on file    Post-Op Info:  Procedure(s) (LRB):  CRANIOTOMY-- left crani for clot evac with microscrope (Left)   6 Days Post-Op         Medications:  Continuous Infusions:   nicardipine 12.5 mg/hr (04/28/19 2104)     Scheduled Meds:   bisacodyl  10 mg Rectal Daily    calcitriol  0.5 mcg Oral BID    calcium carbonate  2,000 mg Oral TID    calcium gluconate IVPB  1,000 mg Intravenous Q1H    carvedilol  25 mg Oral BID    cloNIDine  0.1 mg Oral TID    famotidine  20 mg Oral Daily    hydrALAZINE  100 mg Oral Q8H    lacosamide  50 mg Oral BID    levETIRAcetam  500 mg Oral BID    polyethylene glycol  17 g Oral Daily    senna-docusate 8.6-50 mg  2 tablet Oral Daily    [START ON 4/29/2019] tacrolimus  2 mg Oral Daily    tacrolimus  2 mg Oral Daily     PRN Meds:dextrose 50%, glucagon (human recombinant), hydrALAZINE, insulin aspart U-100, labetalol, midazolam, ondansetron, oxyCODONE, sodium chloride 0.9%     Review of Systems     Unable to aphasia   Objective:     Weight: 54.8 kg (120 lb 13 oz)  Body mass index is 22.1 kg/m².  Vital Signs (Most Recent):  Temp: 98.8 °F (37.1 °C) (04/28/19 1905)  Pulse: 79 (04/28/19 2207)  Resp: (!) 24 (04/28/19 2207)  BP: (!) 143/67 (04/28/19 1932)  SpO2: 95 % (04/28/19 2207) Vital Signs (24h Range):  Temp:  [97.7 °F (36.5 °C)-98.8 °F (37.1 °C)] 98.8 °F (37.1 °C)  Pulse:  [74-86] 79  Resp:  [12-26] 24  SpO2:  [92 %-98 %] 95 %  BP: (109-153)/(57-79) 143/67  Arterial Line BP: (111-141)/(55-75) 124/60     Date 04/28/19 0700 - 04/29/19 0659   Shift 3122-0515 8167-0973 7856-3632 24 Hour Total   INTAKE   P.O. 600 120  720   I.V.(mL/kg) 464.2(8.5) 161.3(2.9)  625.4(11.4)   IV Piggyback  100  100   Shift Total(mL/kg) 1064.2(19.4) 381.3(7)  1445.4(26.4)   OUTPUT   Shift Total(mL/kg)       Weight (kg) 54.8 54.8 54.8 54.8                        Hemodialysis AV Fistula Left  forearm (Active)   Needle Size 15ga 4/25/2019  1:20 PM   Site Assessment Clean;Dry;Intact 4/25/2019  1:20 PM   Patency Present;Thrill;Bruit 4/25/2019  1:20 PM   Status Accessed 4/25/2019  1:20 PM   Flows Good 4/25/2019  3:05 AM   Dressing Intervention Dressing reinforced 4/25/2019  3:05 AM   Dressing Status Clean;Intact;Dry 4/25/2019 11:01 AM   Site Condition No complications 4/25/2019 11:01 AM   Dressing Occlusive 4/25/2019 11:01 AM   Drainage Description Other (Comment) 4/14/2018  5:26 PM       Neurosurgery Physical Exam  [id number]    aox1 name  PERRLA   Partial transcortical motor aphasia.  Move L side and RLE AG, RUE paresis with minimal WD  Wound c/d/i        Significant Labs:  Recent Labs   Lab 04/27/19  0813  04/28/19  0200 04/28/19  0708 04/28/19  1600   *  --  149*  --  204*   *   < > 135* 137 133*   K 4.3  --  5.0  --  5.1   CL 99  --  100  --  99   CO2 20*  --  18*  --  17*   BUN 63*  --  80*  --  88*   CREATININE 7.1*  --  7.8*  --  8.2*   CALCIUM 6.3*  --  4.9*  --  5.0*   MG  --   --  2.3  --   --     < > = values in this interval not displayed.     Recent Labs   Lab 04/27/19  0813 04/28/19  0200 04/28/19  2207   WBC 7.95 4.99  --    HGB 11.3* 9.1*  --    HCT 34.0* 27.7* 31*    118*  --      Recent Labs   Lab 04/27/19  0308 04/27/19  0813 04/28/19  0200   INR 1.2  --  1.3*   APTT  --  25.7 26.4     Microbiology Results (last 7 days)     ** No results found for the last 168 hours. **        All pertinent labs from the last 24 hours have been reviewed.    Significant Diagnostics:  CT: Ct Head Without Contrast    Result Date: 4/25/2019  Evolving operative change from left temporal craniectomy and cranioplasty for left posterior temporal parenchymal hematoma evacuation. There is reduced postoperative pneumocephalus with small volumes of increased parenchymal hemorrhage within and about the resection cavity. Evolving mass effect and edema with continued 5-6 mm of rightward midline shift  similar to prior. There is continued small volume intraventricular hemorrhage with slight increase distension of the lateral ventricles concerning for component of evolving hydrocephalus. 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 Follow up scan with more punctuate ICH and perilesional edema as well as IV after severe hypertensive episode.     No acute events overnight. Pt with improving aphasia.    --Continue care per primary team.  --Continue levetiracetam 500 bid.  --Continue lacosamide 50 bid.  --Continue hourly neuromonitoring.  --Continue strict blood pressure control.  --We will continue to monitor closely, please contact us with any questions or concerns.                       Tian Whitlock MD  Neurosurgery  [facility]

## 2019-04-29 NOTE — PROGRESS NOTES
[facility]  Neurosurgery  Progress Note    Subjective:         Post-Op Info:  Procedure(s) (LRB):  CRANIOTOMY-- left crani for clot evac with microscrope (Left)   7 Days Post-Op     NAEON. Still on Cardene. Exam improving. No issues per nursing.     Medications:  Continuous Infusions:   nicardipine 15 mg/hr (04/29/19 0800)     Scheduled Meds:   bisacodyl  10 mg Rectal Daily    calcitriol  0.5 mcg Oral BID    calcium carbonate  2,000 mg Oral TID    carvedilol  25 mg Oral BID    cloNIDine  0.1 mg Oral TID    famotidine  20 mg Oral Daily    hydrALAZINE  100 mg Oral Q8H    lacosamide  50 mg Oral BID    levETIRAcetam  500 mg Oral BID    polyethylene glycol  17 g Oral Daily    senna-docusate 8.6-50 mg  2 tablet Oral Daily    tacrolimus  2 mg Oral Daily    tacrolimus  2 mg Oral Daily     PRN Meds:dextrose 50%, glucagon (human recombinant), hydrALAZINE, insulin aspart U-100, labetalol, midazolam, ondansetron, oxyCODONE, sodium chloride 0.9%     Review of Systems     Unable to aphasia   Objective:     Weight: 54.8 kg (120 lb 13 oz)  Body mass index is 22.1 kg/m².  Vital Signs (Most Recent):  Temp: 97.6 °F (36.4 °C) (04/29/19 0700)  Pulse: 74 (04/29/19 0800)  Resp: (!) 21 (04/29/19 0800)  BP: (!) 151/68 (04/29/19 0800)  SpO2: 96 % (04/29/19 0800) Vital Signs (24h Range):  Temp:  [97.6 °F (36.4 °C)-98.8 °F (37.1 °C)] 97.6 °F (36.4 °C)  Pulse:  [66-86] 74  Resp:  [16-26] 21  SpO2:  [93 %-97 %] 96 %  BP: (115-164)/(55-93) 151/68  Arterial Line BP: (100-148)/(49-77) 143/74     Date 04/29/19 0700 - 04/30/19 0659   Shift 8814-3365 6732-7052 3190-1549 24 Hour Total   INTAKE   P.O. 100   100   Shift Total(mL/kg) 100(1.8)   100(1.8)   OUTPUT   Shift Total(mL/kg)       Weight (kg) 54.8 54.8 54.8 54.8                        Hemodialysis AV Fistula Left forearm (Active)   Needle Size 15ga 4/25/2019  1:20 PM   Site Assessment Clean;Dry;Intact 4/25/2019  1:20 PM   Patency Present;Thrill;Bruit 4/25/2019   1:20 PM   Status Accessed 4/25/2019  1:20 PM   Flows Good 4/25/2019  3:05 AM   Dressing Intervention Dressing reinforced 4/25/2019  3:05 AM   Dressing Status Clean;Intact;Dry 4/25/2019 11:01 AM   Site Condition No complications 4/25/2019 11:01 AM   Dressing Occlusive 4/25/2019 11:01 AM   Drainage Description Other (Comment) 4/14/2018  5:26 PM       Neurosurgery Physical Exam  [id number]    aox1 name  PERRLA   Partial transcortical motor aphasia  Move L side and RLE AG, RUE paresis with minimal WD and some effort against gravity   Wound c/d/i        Significant Labs:  Recent Labs   Lab 04/28/19  0200  04/28/19  1600 04/28/19 2205 04/29/19  0232   *  --  204* 161* 144*   *   < > 133* 132* 133*   K 5.0  --  5.1 5.3* 5.8*     --  99 100 100   CO2 18*  --  17* 16* 17*   BUN 80*  --  88* 89* 90*   CREATININE 7.8*  --  8.2* 8.3* 8.7*   CALCIUM 4.9*  --  5.0* 5.0* 5.3*   MG 2.3  --   --   --   --     < > = values in this interval not displayed.     Recent Labs   Lab 04/28/19  0200 04/28/19 2207 04/29/19  0232   WBC 4.99  --  4.79   HGB 9.1*  --  8.9*   HCT 27.7* 31* 26.5*   *  --  124*     Recent Labs   Lab 04/28/19  0200 04/29/19  0232   INR 1.3*  --    APTT 26.4 27.1     Microbiology Results (last 7 days)     ** No results found for the last 168 hours. **        All pertinent labs from the last 24 hours have been reviewed.    Significant Diagnostics:  CT: Ct Head Without Contrast    Result Date: 4/25/2019  Evolving operative change from left temporal craniectomy and cranioplasty for left posterior temporal parenchymal hematoma evacuation. There is reduced postoperative pneumocephalus with small volumes of increased parenchymal hemorrhage within and about the resection cavity. Evolving mass effect and edema with continued 5-6 mm of rightward midline shift similar to prior. There is continued small volume intraventricular hemorrhage with slight increase distension of the lateral ventricles concerning  for component of evolving hydrocephalus. 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 Follow up scan with more punctuate ICH and perilesional edema as well as IV after severe hypertensive episode.       POD#7           Continue ICU care           Follow up scan stable               Continue neurochecks q1h,  improving aphasia  HOB>30  Keppra and lacosamide   Na >135  HDS, keep SBP <140, now on Cardene. Adjust PO and wean IV  Sating well at RA  AF and WBC WNL and H/H stable  HD today   Daily PT/OT  DVTx: SCD/TCD and start SQH  Further management of care per NCC  Stepdown to NSGY pending BP control and weaning Cardene   Will continue to monitor. Please page NSGY with any changes in exam        Kimberly Roy MD  Neurosurgery  [facility]

## 2019-04-29 NOTE — PLAN OF CARE
Problem: SLP Goal  Goal: SLP Goal  Goals to be met 5/2    1. Pt will tolerate diet of thin liquids and dental soft solids without overt clinical signs of aspiration   2. Pt will participate in trials of regular solids within speech therapy sessions to help determine least restrictive diet   3. Pt will demonstrate orientation to place, situation , family members, date w/ min cues   4. Pt will complete category inclusions task w/ 80% acc w/ min cues   5. Pt will generate 3 items per category w/ min cues to enhance organizations skills   6. Pt will label items w/ 80%acc w/ mincues to enhance verbal expression skills   7. Pt will participate in ongoing assessment of speech language and cognitive linguistic skills to help rule out deficits and determine therapeutic plan of care      Outcome: Ongoing (interventions implemented as appropriate)  Pt lethargic with difficulty sustaining alertness and attention.  Frequent perseveration and irrelevant responses or paraphasias in responses.  Cont to follow.   ROM Fritz, CCC-SLP  Speech Language Pathologist  [phone]  4/29/2019

## 2019-04-29 NOTE — SUBJECTIVE & OBJECTIVE
Interval History:  Please see hospital course    Review of Systems   Constitutional: Negative for fever.   HENT: Negative for drooling and trouble swallowing.    Respiratory: Negative for choking and shortness of breath.    Gastrointestinal: Negative for nausea and vomiting.   Allergic/Immunologic: Positive for immunocompromised state.   Neurological: Positive for speech difficulty and weakness. Negative for seizures.   Psychiatric/Behavioral: Positive for confusion. Negative for agitation.       Objective:     Vitals:  Temp: 97.8 °F (36.6 °C)  Pulse: 63  Rhythm: normal sinus rhythm  BP: (!) 151/68  MAP (mmHg): 97  Resp: 19  SpO2: 99 %  O2 Device (Oxygen Therapy): nasal cannula    Temp  Min: 97.6 °F (36.4 °C)  Max: 98.8 °F (37.1 °C)  Pulse  Min: 63  Max: 85  BP  Min: 118/55  Max: 164/87  MAP (mmHg)  Min: 79  Max: 119  Resp  Min: 15  Max: 24  SpO2  Min: 92 %  Max: 100 %    04/28 0701 - 04/29 0700  In: 1833.6 [P.O.:720; I.V.:913.6]  Out: -    Unmeasured Output  Stool Occurrence: 1       Physical Exam   Constitutional: She appears well-developed. She appears lethargic. She is cooperative. She appears ill. No distress.   HENT:   Head: Normocephalic.   Eyes: Pupils are equal, round, and reactive to light.   Neck: Normal range of motion.   Cardiovascular: Normal rate and regular rhythm.   Pulmonary/Chest: Effort normal. No tachypnea. No respiratory distress.   Abdominal: Soft. She exhibits no distension.   Musculoskeletal: Normal range of motion. She exhibits no edema.   Neurological: She appears lethargic. She displays no tremor. She displays no seizure activity.   Lethargic, not fully cooperative  Follows simple commands  Moves all 4 extremities  PERRL, brisk     Skin: Skin is warm. She is not diaphoretic.   Vitals reviewed.    Unable to test orientation, coordination, gait due to level of consciousness.    Medications:  Continuous  nicardipine Last Rate: Stopped (04/29/19 1000)   Sodium Chloride 2% Last Rate: 50 mL/hr  at 04/29/19 1500   Scheduled  sodium chloride 0.9%  Once   bisacodyl 10 mg Daily   calcitriol 0.5 mcg BID   calcium carbonate 2,000 mg TID   carvedilol 25 mg BID   cloNIDine 0.2 mg Q8H   famotidine 20 mg Daily   hydrALAZINE 100 mg Q8H   lacosamide 50 mg BID   levETIRAcetam 500 mg BID   polyethylene glycol 17 g Daily   senna-docusate 8.6-50 mg 2 tablet Daily   [START ON 4/30/2019] tacrolimus 3 mg Daily   tacrolimus 3 mg Daily   PRN  dextrose 50% 12.5 g PRN   glucagon (human recombinant) 1 mg PRN   hydrALAZINE 10 mg Q4H PRN   insulin aspart U-100 1-10 Units Q6H PRN   labetalol 10 mg Q4H PRN   midazolam 2 mg On Call Procedure   ondansetron 4 mg Q6H PRN   oxyCODONE 5 mg Q6H PRN   sodium chloride 0.9% 10 mL PRN     Today I personally reviewed pertinent medications, lines/drains/airways, imaging, cardiology results, laboratory results, microbiology results, notably:    Recent Labs   Lab 04/29/19  0232   WBC 4.79   RBC 3.27*   HGB 8.9*   HCT 26.5*   *   MCV 81*   MCH 27.2   MCHC 33.6     Recent Labs   Lab 04/29/19  0232  04/29/19  1504   CALCIUM 5.3*  --  5.0*   PROT 6.6  --   --    *  --  132*   K 5.8*   < > 4.9   CO2 17*  --  17*     --  100   BUN 90*  --  99*   CREATININE 8.7*  --  8.9*   ALKPHOS 376*  --   --    ALT 7*  --   --    AST 31  --   --    BILITOT 0.4  --   --     < > = values in this interval not displayed.       Diet  Diet renal [facility]; Dysphagia (Mechanical Soft)  Diet renal [facility]; Dysphagia (Mechanical Soft)

## 2019-04-29 NOTE — SUBJECTIVE & OBJECTIVE
Medications:  Continuous Infusions:   nicardipine 12.5 mg/hr (04/28/19 2104)     Scheduled Meds:   bisacodyl  10 mg Rectal Daily    calcitriol  0.5 mcg Oral BID    calcium carbonate  2,000 mg Oral TID    calcium gluconate IVPB  1,000 mg Intravenous Q1H    carvedilol  25 mg Oral BID    cloNIDine  0.1 mg Oral TID    famotidine  20 mg Oral Daily    hydrALAZINE  100 mg Oral Q8H    lacosamide  50 mg Oral BID    levETIRAcetam  500 mg Oral BID    polyethylene glycol  17 g Oral Daily    senna-docusate 8.6-50 mg  2 tablet Oral Daily    [START ON 4/29/2019] tacrolimus  2 mg Oral Daily    tacrolimus  2 mg Oral Daily     PRN Meds:dextrose 50%, glucagon (human recombinant), hydrALAZINE, insulin aspart U-100, labetalol, midazolam, ondansetron, oxyCODONE, sodium chloride 0.9%     Review of Systems     Unable to aphasia   Objective:     Weight: 54.8 kg (120 lb 13 oz)  Body mass index is 22.1 kg/m².  Vital Signs (Most Recent):  Temp: 98.8 °F (37.1 °C) (04/28/19 1905)  Pulse: 79 (04/28/19 2207)  Resp: (!) 24 (04/28/19 2207)  BP: (!) 143/67 (04/28/19 1932)  SpO2: 95 % (04/28/19 2207) Vital Signs (24h Range):  Temp:  [97.7 °F (36.5 °C)-98.8 °F (37.1 °C)] 98.8 °F (37.1 °C)  Pulse:  [74-86] 79  Resp:  [12-26] 24  SpO2:  [92 %-98 %] 95 %  BP: (109-153)/(57-79) 143/67  Arterial Line BP: (111-141)/(55-75) 124/60     Date 04/28/19 0700 - 04/29/19 0659   Shift 7966-2675 5420-2116 1025-9339 24 Hour Total   INTAKE   P.O. 600 120  720   I.V.(mL/kg) 464.2(8.5) 161.3(2.9)  625.4(11.4)   IV Piggyback  100  100   Shift Total(mL/kg) 1064.2(19.4) 381.3(7)  1445.4(26.4)   OUTPUT   Shift Total(mL/kg)       Weight (kg) 54.8 54.8 54.8 54.8                        Hemodialysis AV Fistula Left forearm (Active)   Needle Size 15ga 4/25/2019  1:20 PM   Site Assessment Clean;Dry;Intact 4/25/2019  1:20 PM   Patency Present;Thrill;Bruit 4/25/2019  1:20 PM   Status Accessed 4/25/2019  1:20 PM   Flows Good 4/25/2019  3:05 AM   Dressing  Intervention Dressing reinforced 4/25/2019  3:05 AM   Dressing Status Clean;Intact;Dry 4/25/2019 11:01 AM   Site Condition No complications 4/25/2019 11:01 AM   Dressing Occlusive 4/25/2019 11:01 AM   Drainage Description Other (Comment) 4/14/2018  5:26 PM       Neurosurgery Physical Exam  [id number]    aox1 name  SHRAVAN   Partial transcortical motor aphasia.  Move L side and RLE AG, RUE paresis with minimal WD  Wound c/d/i        Significant Labs:  Recent Labs   Lab 04/27/19  0813  04/28/19  0200 04/28/19  0708 04/28/19  1600   *  --  149*  --  204*   *   < > 135* 137 133*   K 4.3  --  5.0  --  5.1   CL 99  --  100  --  99   CO2 20*  --  18*  --  17*   BUN 63*  --  80*  --  88*   CREATININE 7.1*  --  7.8*  --  8.2*   CALCIUM 6.3*  --  4.9*  --  5.0*   MG  --   --  2.3  --   --     < > = values in this interval not displayed.     Recent Labs   Lab 04/27/19  0813 04/28/19  0200 04/28/19  2207   WBC 7.95 4.99  --    HGB 11.3* 9.1*  --    HCT 34.0* 27.7* 31*    118*  --      Recent Labs   Lab 04/27/19  0308 04/27/19  0813 04/28/19  0200   INR 1.2  --  1.3*   APTT  --  25.7 26.4     Microbiology Results (last 7 days)     ** No results found for the last 168 hours. **        All pertinent labs from the last 24 hours have been reviewed.    Significant Diagnostics:  CT: Ct Head Without Contrast    Result Date: 4/25/2019  Evolving operative change from left temporal craniectomy and cranioplasty for left posterior temporal parenchymal hematoma evacuation. There is reduced postoperative pneumocephalus with small volumes of increased parenchymal hemorrhage within and about the resection cavity. Evolving mass effect and edema with continued 5-6 mm of rightward midline shift similar to prior. There is continued small volume intraventricular hemorrhage with slight increase distension of the lateral ventricles concerning for component of evolving hydrocephalus. Continued diffuse effacement cerebral sulci at the  vertex concerning for sequela of cerebral edema or evolving hydrocephalus Electronically signed by: Reggie Espinoza DO Date:    04/25/2019 Time:    15:50

## 2019-04-29 NOTE — PT/OT/SLP PROGRESS
"Speech Language Pathology Treatment    Patient Name:  Holly Patel   MRN:  [id number]  Admitting Diagnosis: Brown tumor    Recommendations:                 General Recommendations:  Speech/language therapy and monitor diet tolerance/assess for appropriateness for diet upgrade  Diet recommendations:  Dental Soft, Liquid Diet Level: Thin   Aspiration Precautions: 1 bite/sip at a time, Alternating bites/sips, Assistance with meals, Avoid talking while eating, Eliminate distractions, Feed only when awake/alert, Frequent oral care, HOB to 90 degrees, Monitor for s/s of aspiration, Small bites/sips and Strict aspiration precautions   General Precautions: Standard, aspiration, aphasia, fall, seizure  Communication strategies:  go to room if call light pushed and Pt with aphasia    Subjective     "29th" pt perseverating on this response during orientation q's after today's date was stated.    Pain/Comfort:  · Pain Rating 1: (no indications of pain)    Objective:     Has the patient been evaluated by SLP for swallowing?   Yes  Keep patient NPO? No   Current Respiratory Status: room air      Pt was seen at bedside for speech-language therapy with mother present.  Pt noted to be lethargic with difficulty maintaining alertness and attention.  Pt was unable to orient to month, providing off-topic/irrelevant responses. She did not orient to year or place despite max cues. She stated her mother's name and recalled niece who gave the stuffed animal in her room given min cues.  Pt was unable to name objects in the room despite max cues. She did count from 1-10 ind'ly. She stated 4 days of the week given max cues.  Pt completed automatic sentences with 40% accuracy ind'ly/60% given cues.  She named 1 item in simple categories with 20% acc. Ind'ly/80% given cues.       Assessment:     Holly Patel is a 28 y.o. female with an SLP diagnosis of Aphasia and Cognitive-Linguistic Impairment.     Goals:   Multidisciplinary " Problems     SLP Goals        Problem: SLP Goal    Goal Priority Disciplines Outcome   SLP Goal     SLP Ongoing (interventions implemented as appropriate)   Description:  Goals to be met 5/2    1. Pt will tolerate diet of thin liquids and dental soft solids without overt clinical signs of aspiration   2. Pt will participate in trials of regular solids within speech therapy sessions to help determine least restrictive diet   3. Pt will demonstrate orientation to place, situation , family members, date w/ min cues   4. Pt will complete category inclusions task w/ 80% acc w/ min cues   5. Pt will generate 3 items per category w/ min cues to enhance organizations skills   6. Pt will label items w/ 80%acc w/ mincues to enhance verbal expression skills   7. Pt will participate in ongoing assessment of speech language and cognitive linguistic skills to help rule out deficits and determine therapeutic plan of care                       Plan:     · Patient to be seen:  4 x/week   · Plan of Care expires:     · Plan of Care reviewed with:  patient, mother   · SLP Follow-Up:  Yes       Discharge recommendations:  rehabilitation facility       Time Tracking:     SLP Treatment Date:   04/29/19  Speech Start Time:  1501  Speech Stop Time:  1527     Speech Total Time (min):  26 min    Billable Minutes: Speech Therapy Individual 26    ROM Fritz, CCC-SLP  04/29/2019     ROM Fritz, CCC-SLP  Speech Language Pathologist  [phone]  4/29/2019

## 2019-04-29 NOTE — ASSESSMENT & PLAN NOTE
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

## 2019-04-29 NOTE — PLAN OF CARE
Problem: Adult Inpatient Plan of Care  Goal: Plan of Care Review  Outcome: Ongoing (interventions implemented as appropriate)  POC reviewed with pt at 0500. Pt verbalized understanding, but requires reinforcement.  Questions and concerns addressed. No acute events overnight. Cardene gtt titrated to maintain SBP < 140 per order. Pt progressing toward goals. Will continue to monitor. See flowsheets for full assessment and VS info

## 2019-04-29 NOTE — TREATMENT PLAN
Treatment Plan  04/29/2019  2:27 PM    Chart reviewed and case discussed with attending. We are following Ms. Patel for IS and recommendations are:  --Daily CMP, CBC, and INR   --Daily Tacrolimus level  --Increase Tacrolimus to 3/3 (order changed by GI fellow)  --We will continue to follow alongside primary team (please promptly notify us of discharge in order to arrange for appropriate outpatient follow up).    Elinor Medeiros M.D.  Gastroenterology Fellow, PGY-V  Pager: [phone]  [facility]

## 2019-04-29 NOTE — PLAN OF CARE
Problem: Adult Inpatient Plan of Care  Goal: Plan of Care Review  POC reviewed with pt and family at 1600. Pt and mother verbalized understanding. Questions and concerns addressed. No acute events today. Pt progressing toward goals. Pt currently on HD, tolerating well, no signs of distress, Will continue to monitor. See flowsheets for full assessment and VS info.

## 2019-04-29 NOTE — PLAN OF CARE
04/29/19 1254   Post-Acute Status   Post-Acute Authorization Placement   Post-Acute Placement Status Referrals Sent     SW met with Pt mother at bedside. Discussed rehab list given to Pt on Friday. Pt's mother reported that her rehab preference for the Pt is [facility]. She is still reviewing the list for further choices.    SW sent referral via  and granted them Epic access.    Karla Kaplan LMSW  [facility]

## 2019-04-29 NOTE — PROCEDURES
DATE OF PROCEDURE:  04/25/2019    EEG #:  [id number].    REQUESTING PHYSICIAN:  Dr. Roblero.    LOCATION OF SERVICE:  [facility].    ICU EEG/VIDEO MONITORING REPORT     METHODOLOGY:  Electroencephalographic (EEG) is recorded with electrodes placed   according to the International 10-20 placement system.  Thirty two (32) channels   of digital signal (sampling rate of 512/sec), including T1 and T2, were   simultaneously recorded from the scalp and may include EKG, EMG, and/or eye   monitors.  Recording band pass was 0.1 to 512 Hz.  Digital video recording of   the patient is simultaneously recorded with the EEG.  The patient is instructed   to report clinical symptoms which may occur during the recording session.  EEG   and video recording are stored and archived in digital format.  Activation   procedures, which include photic stimulation, hyperventilation and instructing   patients to perform simple tasks, are done in selected patients  The EEG is displayed on a monitor screen and can be reviewed using different   montages.  Computer-assisted analysis is employed to detect spike and   electrographic seizure activity.   The entire record is submitted for computer   analysis.  The entire recording is visually reviewed, and the times identified   by computer analysis as being spikes or seizures are reviewed again.    Compressed spectral analysis (CSA) is also performed on the activity recorded   from each individual channel.  This is displayed as a power display of   frequencies from 0 to 30 Hz over time.   The CSA is reviewed looking for   asymmetries in power between homologous areas of the scalp, then compared with   the original EEG recording.    [employer] software was also utilized in the review of this study.  This software   suite analyzes the EEG recording in multiple domains.  Coherence and rhythmicity   are computed to identify EEG sections which may contain organized seizures.    Each channel undergoes analysis to  detect the presence of spike and sharp waves   which have special and morphological characteristics of epileptic activity.  The   routine EEG recording is converted from special into frequency domain.  This is   then displayed comparing homologous areas to identify areas of significant   asymmetry.  Algorithm to identify non-cortically generated artifact is used to   separate artifact from the EEG.        RECORDING TIMES:  Start on 04/25/2019 at 16:35.  End on 04/26/2019 at 07:00.     A total of 14 hours and 20 min of EEG monitoring was obtained.    EEG FINDINGS:  Recording was obtained at the patient's bedside in the ICU.  The   patient was moving around some spontaneously and was breathing on her own.    Background consisted most prominently of a 6 Hz theta, which was seen diffusely   that was intermixed with 1-1/2 to 2 Hz delta intermittently.  The frequencies   recorded were fairly symmetrical over the 2 hemispheres.  However, there was   lower voltage and less delta activity noted from the left mid and posterior   temporal region.  No spike or sharp wave activity was seen.  There were no   rhythmic buildups to indicate subclinical or non-convulsive seizures.  During   the night, when the patient appeared to be asleep, background was more   continuous mixture of irregular delta and 4 to 5 Hz theta.  The same voltage   asymmetry was noted during sleep.  Activation procedures were not carried out.    IMPRESSION:  Markedly abnormal EEG with diffuse slowing noted in the daytime   indicative of a diffuse encephalopathy, nonspecific.  There are some focal   findings and amplitude is lower in the left mid and posterior temporal region,   both waking and sleeping states, indicating a structural dysfunction in that   area.  Sleeping pattern was noted, but the cycles of sleep were not recorded.    There also goes along with an encephalopathic process.  No epileptic activity   was recorded.      RR/HN  dd: 04/29/2019  15:40:34 (CDT)  td: 04/29/2019 16:36:44 (JEAN)  Doc ID   #[id number]  Job ID #[id number]    CC:

## 2019-04-29 NOTE — ASSESSMENT & PLAN NOTE
ESRD w/ HD on MWF as outpatient  [place] [facility]  Pertinent labs (4/29); BUN/Cr 90/8.7, K 5.8)    - kayexalate + insulin/D50 -> repeat k 4.9  - nephrology following, appreciate their assistance  - plans for low flow HD today  - monitor BMP q8 hr

## 2019-04-29 NOTE — ASSESSMENT & PLAN NOTE
ESRD on iHD TTS  [facility]  Dr. Bass  3.5 hours  EDW ~ 50 kg  LFA AVF    Plan/Recommendations:  -Will provide dialysis today for metabolic clearance and volume management  -Will dialyze using low flow HD   -Target UF 1-1.5 L as tolerated, keep MAP >65  -Calcium Gluconate 3 gm IVPB x 1 now  -continue strict I/O's  -daily renal panel with phos added

## 2019-04-30 NOTE — PLAN OF CARE
Problem: SLP Goal  Goal: SLP Goal  Goals to be met 5/2    1. Pt will tolerate diet of thin liquids and dental soft solids without overt clinical signs of aspiration   2. Pt will participate in trials of regular solids within speech therapy sessions to help determine least restrictive diet   3. Pt will demonstrate orientation to place, situation , family members, date w/ min cues   4. Pt will complete category inclusions task w/ 80% acc w/ min cues   5. Pt will generate 3 items per category w/ min cues to enhance organizations skills   6. Pt will label items w/ 80%acc w/ mincues to enhance verbal expression skills   7. Pt will participate in ongoing assessment of speech language and cognitive linguistic skills to help rule out deficits and determine therapeutic plan of care      Pt with perseverations noted but able to improve word finding with phonemic cues.      ROM Donald, CCC-SLP  4/30/2019

## 2019-04-30 NOTE — PLAN OF CARE
Problem: Physical Therapy Goal  Goal: Physical Therapy Goal  Goals to be met by: 7 days ()    Patient will increase functional independence with mobility by performin. Supine to sit with Set-up [place]  2. Sit to supine with Set-up [place]  3. Sit to stand transfer with Supervision  4. Gait  x 200 feet with Stand By Assistance using no assistive device  5. Lower extremity exercise program x30 reps per handout, with independence to improve muscular strength and endurance.        Outcome: Ongoing (interventions implemented as appropriate)  Goals reviewed and remain appropriate. Pt progressing towards goals.    Adriane Gooden, PT, DPT   2019  [phone]

## 2019-04-30 NOTE — HPI
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  Patient presented to [facility] on 4/22 with Planned surgical excision of L calvarial lesion s/p excision and cranioplasty 4/22/19.  Hospital course complicated by acute worsening of neuro exam ( STAT CT revealed enlarging hematoma w/ midline shift. Urgently taken down to OR for evacuation on 4/22), renovascular hypertension (courtney gtt restarted overnight), low Ca, & thrombocytopenia. 2% Na gtt yesterday.     Functional History: Per chart review, patient lives in [place] with mother in a single story home with no steps to enter.  Prior to admission, (I) with ADLs and mobility prior to 08/2018. DME: none.

## 2019-04-30 NOTE — ASSESSMENT & PLAN NOTE
ESRD w/ HD on MWF as outpatient  AVF in [facility]  Pertinent labs (4/29); BUN/Cr 61/6.5, K 4.4)    - nephrology following, appreciate their assistance  - successful HD yesterday (UF 1.5L), plans for repeating low flow HD tomorrow  - monitor BMP q12 hr

## 2019-04-30 NOTE — PROGRESS NOTES
NCC team notified of patient's systolic blood pressure staying in the 150s-160s despite PRN labetalol and hydralazine. Ordered to restart cardene drip. Will continue to monitor closely.

## 2019-04-30 NOTE — ASSESSMENT & PLAN NOTE
-S/p urgent left crani for clot evacuation  -NSGY following  -currently receiving 2% NA gtt     See hospital course for functional, cognitive/speech/language, and nutrition/swallow status.      Recommendations  -  Encourage mobility, OOB in chair at least 3 hours per day, and early ambulation as appropriate   -  PT/OT evaluate and treat  -  SLP speech and cognitive evaluate and treat  -  Monitor sleep disturbances and establish consistent sleep-wake cycle  -  Monitor for bowel and bladder dysfunction  -  Monitor for shoulder pain, subluxation, & spasticity  -  Monitor for and prevent skin breakdown and pressure ulcers  · Early mobility, repositioning/weight shifting every 20-30 minutes when sitting, turn patient every 2 hours, proper mattress/overlay and chair cushioning, pressure relief/heel protector boots  -  DVT prophylaxis (if appropriate)  -  Reviewed discharge options (IP rehab, SNF, HH therapy, and OP therapy)

## 2019-04-30 NOTE — PLAN OF CARE
Problem: Adult Inpatient Plan of Care  Goal: Plan of Care Review  Outcome: Ongoing (interventions implemented as appropriate)  POC reviewed with pt and family at 1700. Pt and family verbalized understanding. Pt restarted on Cardene due to blood pressure consistently exceeding the systolic goal. Questions and concerns addressed. No acute events today. Pt progressing toward goals. Will continue to monitor. See flowsheets for full assessment and VS info.

## 2019-04-30 NOTE — ASSESSMENT & PLAN NOTE
Ca 5.7, iCa 0.75    - increased Calcitriol to 1 mcg q12 hr  - continue Calcium carbonate 2000 mg q8 hr

## 2019-04-30 NOTE — CONSULTS
Inpatient consult to Physical Medicine Rehab  Consult performed by: Odessa Murrell NP  Consult ordered by: Kimberly Roy MD  Reason for consult: Right sided weakness        Reviewed patient history and current admission.  Rehab team following.  Full consult to follow.    JANESSA Ovalles, FNP-C  Physical Medicine & Rehabilitation   04/30/2019  Spectralink: [id number]

## 2019-04-30 NOTE — PT/OT/SLP PROGRESS
"Physical Therapy Treatment    Patient Name:  Holly Patel   MRN:  [id number]    Recommendations:     Discharge Recommendations:  rehabilitation facility   Discharge Equipment Recommendations: none   Barriers to discharge: Decreased caregiver support at current functional level    Assessment:     Holly Patel is a 28 y.o. female admitted with a medical diagnosis of Nontraumatic subcortical hemorrhage of left cerebral hemisphere.  She presents with the following impairments/functional limitations:  weakness, impaired self care skills, impaired endurance, impaired functional mobilty, gait instability, impaired balance, decreased safety awareness, impaired cognition, impaired cardiopulmonary response to activity. Pt progressing functional mobility, as she was able to ambulate to/from bathroom this date. However, demo'ing impaired endurance and generalized weakness with increased fatigue noted following mobility. Pt also continues to demo delaying processing, requiring increased cues and time to complete tasks at hand. Pt would continue to benefit from skilled acute PT in order to address current deficits and progress functional mobility.     Rehab Prognosis: Good; patient would benefit from acute skilled PT services to address these deficits and reach maximum level of function.    Recent Surgery: Procedure(s) (LRB):  CRANIOTOMY-- left crani for clot evac with microscrope (Left) 8 Days Post-Op    Plan:     During this hospitalization, patient to be seen 3 x/week to address the identified rehab impairments via gait training, therapeutic activities, therapeutic exercises, neuromuscular re-education and progress toward the following goals:    · Plan of Care Expires:  05/28/19    Subjective     Chief Complaint: stomach ache  Patient/Family Comments/goals: "Yellow." re: pt able to correctly identify color of socks  Pain/Comfort:  · Pain Rating 1: (reported stomach pain; did not rate)  · Pain Addressed 1: " Reposition, Distraction, Nurse notified      Objective:     Communicated with RN prior to session.  Patient found supine with bed alarm, telemetry, pulse ox (continuous), blood pressure cuff, oxygen upon PT entry to room.     General Precautions: Standard, fall, aspiration, aphasia, seizure   Orthopedic Precautions:N/A   Braces: N/A     Functional Mobility:  · Bed Mobility:     · Supine to Sit: minimum assistance with increased time and cues   · Transfers:     · Sit to Stand:  minimum assistance with hand-held assist  · Toilet Transfer: minimum assistance with  grab bars  using  Stand Pivot  · Gait: ~12 ft. with HHA and CGA + ~12 ft. with no AD and Reji  · Decreased step length, impaired weight-shifting ability, mild instability    · Cues for appropriate weight-shifts and balance corrections provided  · Portable monitor and O2 in place throughout       AM-PAC 6 CLICK MOBILITY  Turning over in bed (including adjusting bedclothes, sheets and blankets)?: 3  Sitting down on and standing up from a chair with arms (e.g., wheelchair, bedside commode, etc.): 3  Moving from lying on back to sitting on the side of the bed?: 3  Moving to and from a bed to a chair (including a wheelchair)?: 3  Need to walk in hospital room?: 3  Climbing 3-5 steps with a railing?: 2  Basic Mobility Total Score: 17       Therapeutic Activities and Exercises:  Delayed processing and decreased attention noted, requiring increased time and cues to complete tasks at hand.    Performed seated toileting with Reji to transfer to/from toilet.   Oriented to call bell and instructed that she must have staff assist for standing tasks/trasnfers.     Patient left up in chair with all lines intact, call button in reach, RN notified and pt's mother present..    GOALS:   Multidisciplinary Problems     Physical Therapy Goals        Problem: Physical Therapy Goal    Goal Priority Disciplines Outcome Goal Variances Interventions   Physical Therapy Goal     PT,  PT/OT Ongoing (interventions implemented as appropriate)     Description:  Goals to be met by: 7 days ()    Patient will increase functional independence with mobility by performin. Supine to sit with Set-up [place]  2. Sit to supine with Set-up [place]  3. Sit to stand transfer with Supervision  4. Gait  x 200 feet with Stand By Assistance using no assistive device  5. Lower extremity exercise program x30 reps per handout, with independence to improve muscular strength and endurance.                         Time Tracking:     PT Received On: 19  PT Start Time: 1100     PT Stop Time: 1123  PT Total Time (min): 23 min     Billable Minutes: Therapeutic Activity 23   (co-tx with OT)    Treatment Type: Treatment  PT/PTA: PT     PTA Visit Number: 0     Adriane Gooden PT, DPT   2019  [phone]

## 2019-04-30 NOTE — ASSESSMENT & PLAN NOTE
On Verapamil, Irbesartan, Hydralazine, Coreg, Clonidine at home    - target sBP <140  - on/off cardene gtt   - continued on hydralazine 100 mg q8, carvedilol 25 mg q12  - added clonidine 0.1 mg q8 (4/28), increased to 0.2 mg q8 hr (4/29), will increase to 0.3 mg q8 hr  - resuming home irbesartan 300 mg qd

## 2019-04-30 NOTE — HOSPITAL COURSE
04/26/2019: Participated with OT.  Bed mobility CGA-Maddy.  Sit to stand and transfers Maddy.  Ambulated  4 steps Maddy with mild dizziness.  UBD/grooming Maddy and Feeding CGA.  04/29/2019: Participated with PT.  Bed mobility Maddy.  Sit to stand and transfers Maddy.  Ambulated 5 steps Maddy. Passed bedside swallow evaluation.  SLP recommending dental soft diet and thin liquids. SLP diagnosis of Aphasia and Cognitive-Linguistic Impairment.   04/30/2019: Participated with therapy.  Bed mobility CGA-Maddy.  Sit to stand CGA and transfers Maddy.  Ambulated 12 ft and 12 ft Maddy-CGA. No grooming.   5/1/19: Participated w/ OT. Static standing CGA. Endurance exercises x 5 sit to stand CGA. Declined adls.   05/02/2019: Participated with therapy.  Bed mobility Maddy.  Sit to stand Maddy.  Ambulated 20 ft x 2 trials Maddy & RW.    05/03/2019: Participated with therapy.  Bed mobility Maddy.  Sit to stand CGA.  LBD/grooming Maddy.  05/06/2019: Participated with therapy.  Bed mobility Maddy .  Sit to stand CGA and transfers Maddy.  UBD/grooming Maddy and LBD ModA.  05/07/2019: Participated with therapy.  Bed mobility SV.  Sit to stand SBA.  Ambulated 44 + 46 ft CGA & RW.  No ADLs.

## 2019-04-30 NOTE — ASSESSMENT & PLAN NOTE
S/p urgent left crani for clot evacuation    - Continue to monitor neurochecks q1 hr  - D/Fabian HTS 2%, starting Na tablets 2 gr q12 hr  - target Na 140, monitor BMP q12 hr  - continue levetiracetam 500 mg q12  - target plt >50K w/o active bleeding (NSGY prefers >100K, transfused with 2 units this AM)

## 2019-04-30 NOTE — PROGRESS NOTES
IGLESIA Taylor informed of pts BP >140. Pt's BP have not been able to be brought down to goal with PRN medication. Per IGLESIA Lundberg please administer 5mg IV push of hydralazine.

## 2019-04-30 NOTE — SUBJECTIVE & OBJECTIVE
Interval History: HD completed yesterday w/o issues, UF 1.5 L. Patient still with hypocalcemia this AM, CoCa 7.06, iCA 0.75 despite HD treatment on yesterday.     Review of patient's allergies indicates:   Allergen Reactions    Chloral hydrate Hallucinations     Other reaction(s): Hallucinations  Other reaction(s): Hives    Hydrocodone Other (See Comments)     Mental status changes    Tolerates oxycodone     Current Facility-Administered Medications   Medication Frequency    0.9%  NaCl infusion (for blood administration) Q24H PRN    0.9%  NaCl infusion (for blood administration) Q24H PRN    bisacodyl suppository 10 mg Daily    calcitriol solution 1 mcg BID    calcium carbonate 500 mg/5 mL (1,250 mg/5 mL) suspension 2,000 mg TID    carvedilol tablet 25 mg BID    cloNIDine tablet 0.3 mg Q8H    dextrose 50% injection 12.5 g PRN    dextrose 50% injection 25 g PRN    famotidine tablet 20 mg Daily    glucagon (human recombinant) injection 1 mg PRN    glucose chewable tablet 16 g PRN    glucose chewable tablet 24 g PRN    hydrALAZINE injection 10 mg Q4H PRN    hydrALAZINE tablet 100 mg Q8H    insulin aspart U-100 pen 1-10 Units QID (AC + HS) PRN    labetalol 20 mg/4 mL (5 mg/mL) IV syring Q4H PRN    lacosamide tablet 50 mg BID    levETIRAcetam tablet 500 mg BID    ondansetron disintegrating tablet 4 mg Q6H PRN    oxyCODONE immediate release tablet 5 mg Q6H PRN    polyethylene glycol packet 17 g Daily    senna-docusate 8.6-50 mg per tablet 2 tablet Daily    sodium chloride 0.9% flush 10 mL PRN    sodium chloride tablet 2 g BID    tacrolimus (PROGRAF) 1 mg/mL oral syringe Daily    tacrolimus (PROGRAF) 1 mg/mL oral syringe Daily       Objective:     Vital Signs (Most Recent):  Temp: 97.9 °F (36.6 °C) (04/30/19 0745)  Pulse: 67 (04/30/19 1005)  Resp: 20 (04/30/19 1005)  BP: (!) 150/83 (04/30/19 1122)  SpO2: 99 % (04/30/19 1005)  O2 Device (Oxygen Therapy): nasal cannula (04/30/19 1005) Vital Signs  (24h Range):  Temp:  [97.5 °F (36.4 °C)-98 °F (36.7 °C)] 97.9 °F (36.6 °C)  Pulse:  [61-71] 67  Resp:  [11-23] 20  SpO2:  [95 %-100 %] 99 %  BP: (132-191)/() 150/83  Arterial Line BP: (118-169)/(65-86) 169/84     Weight: 54.8 kg (120 lb 13 oz) (04/22/19 1100)  Body mass index is 22.1 kg/m².  Body surface area is 1.55 meters squared.    I/O last 3 completed shifts:  In: 2535.6 [P.O.:300; I.V.:1288.6; Blood:247; Other:500; IV Piggyback:200]  Out: 2000 [Other:2000]    Physical Exam   Constitutional: She is oriented to person, place, and time. She appears well-developed. She is sleeping. She is easily aroused. No distress.   HENT:   Right Ear: External ear normal.   Left Ear: External ear normal.   Eyes: Conjunctivae and EOM are normal. Right eye exhibits no discharge. Left eye exhibits no discharge.   Neck: Normal range of motion. Neck supple.   Cardiovascular: Normal rate and regular rhythm. Exam reveals no gallop and no friction rub.   No murmur heard.  Pulmonary/Chest: Effort normal and breath sounds normal. No respiratory distress. She has no wheezes. She has no rales.   Abdominal: Soft. Bowel sounds are normal. She exhibits no distension. There is no tenderness.   Musculoskeletal: Normal range of motion. She exhibits no edema or deformity.   Neurological: She is alert, oriented to person, place, and time and easily aroused.   Skin: Skin is warm and dry. She is not diaphoretic.   Psychiatric: She has a normal mood and affect. Her behavior is normal.       Significant Labs:  CBC:   Recent Labs   Lab 04/30/19  1035   WBC 2.75*   RBC 2.86*   HGB 7.7*   HCT 24.4*   PLT 87*   MCV 85   MCH 26.9*   MCHC 31.6*     CMP:   Recent Labs   Lab 04/30/19  0011 04/30/19  0512   *  138* 109   CALCIUM 5.8*  5.8* 5.7*   ALBUMIN 2.3*  --    PROT 5.7*  --      136 136   K 4.5  4.5 4.4   CO2 21*  21* 21*     102 103   BUN 60*  60* 61*   CREATININE 6.4*  6.4* 6.5*   ALKPHOS 373*  --    ALT 15  --    AST  35  --    BILITOT 0.3  --

## 2019-04-30 NOTE — PROGRESS NOTES
[facility]  Neurocritical Care  Progress Note    Admit Date: 4/22/2019  Service Date: 04/30/2019  Length of Stay: 8    Subjective:     Chief Complaint: Nontraumatic subcortical hemorrhage of left cerebral hemisphere    History of Present Illness: 29 yo F w/ PMH significant for liver transplant in 1991, recent thyroidectomy on 3/27/2019, ESRD on HD (MWF), and epilepsy who presents to [facility] for higher level of care following planned surgical excision of L calvarial lesion s/p excision and cranioplasty 4/22/19. The pt presented to [facility] [place]/12/2019 with a complaint of headaches, among other symptoms, and received a workup that included a CT head which showed a left temporal calvarium lesion with mass effect. At that time she shared that she continued to have a headache, noting the pain was usually over the left temporal aspect of her head but migrated over to the right temporal aspect. She states the pain on the right is exacerbated with palpation of the area. She has no additional symptomatic complaints at this time. Currently stable following surgery. Denies acute pain, otherwise comfortable w/ non-focal neurological exam.       Hospital Course: 4/22: Admitted to [facility]. Initiated cardene gtt to maintain SBP <140. Consulted hepatology and nephrology. Acute worsening of neuro exam. STAT CT revealed enlarging hematoma w/ midline shift. Urgently taken down to OR for evacuation. Return from OR intubated.   4/23: Neuro exam improved from previous. Somnolent but rousable. Following commands. Moving all extremities spontaneously; RUE 4/5. Pending CRRT vs HD today.  04/24/2019: no acute adverse events overnight  4/27: KUB, resume oral feeds, ionized Ca, HD over night successful, increase hydralazine, decrease HTS  4/28: D/C HTS, increase hydralazine, likely add clonidine today as well, wean cardene, suppository  4/29: slight improvement in neuro exam, following commands. Started on HTS 2%. Increased  clonidine and weaned off of cardene this AM, D/Fabian A-line. INR remains elevated. Shifted potassium. nephro with plans for slow HD.   4/30: neurologically unchanged. intermittently on cardene, increase clonidine to 0.3 TID, resuming home irbesartan. D/Fabian HTS 2%, started Na tablets. Transfused with plt x2 by NSGY.    Interval History:  Please see hospital course    Review of Systems   Constitutional: Negative for fever.   HENT: Negative for drooling and trouble swallowing.    Respiratory: Negative for choking and shortness of breath.    Gastrointestinal: Negative for nausea and vomiting.   Allergic/Immunologic: Positive for immunocompromised state.   Neurological: Positive for speech difficulty and weakness. Negative for seizures.   Psychiatric/Behavioral: Positive for confusion. Negative for agitation.       Objective:     Vitals:  Temp: 98 °F (36.7 °C)  Pulse: 71  Rhythm: normal sinus rhythm  BP: (!) 129/58  MAP (mmHg): 83  Resp: 19  SpO2: (!) 92 %  Oxygen Concentration (%): 28  O2 Device (Oxygen Therapy): nasal cannula    Temp  Min: 97.5 °F (36.4 °C)  Max: 98 °F (36.7 °C)  Pulse  Min: 61  Max: 75  BP  Min: 129/58  Max: 191/91  MAP (mmHg)  Min: 83  Max: 128  Resp  Min: 11  Max: 23  SpO2  Min: 92 %  Max: 100 %  Oxygen Concentration (%)  Min: 28  Max: 28    04/29 0701 - 04/30 0700  In: 2111.2 [P.O.:300; I.V.:964.2]  Out: 2000    Unmeasured Output  Stool Occurrence: 1       Physical Exam   Constitutional: She appears well-developed. She appears lethargic. She is cooperative. She appears ill. No distress.   HENT:   Head: Normocephalic.   Eyes: Pupils are equal, round, and reactive to light.   Periorbital edema   Neck: Normal range of motion.   Cardiovascular: Normal rate and regular rhythm.   Pulmonary/Chest: Effort normal. No tachypnea. No respiratory distress.   Abdominal: Soft. She exhibits no distension.   Musculoskeletal: Normal range of motion. She exhibits no edema.   Neurological: She appears lethargic. She  displays no tremor. She displays no seizure activity.   More awake, sitting in bed and eating breakfast this AM  Mild aphasia, Follows simple commands  PERRL, brisk, EOM intact  Moves all 4 extremities  Skin: Skin is warm. She is not diaphoretic.   Psychiatric: She is slowed. She is inattentive.   Vitals reviewed.    Unable to test orientation, coordination, gait due to level of consciousness.    Medications:  Continuous    nicardipine Last Rate: 1 mg/hr (04/30/19 1456)   Scheduled    bisacodyl 10 mg Daily   calcitriol 1 mcg BID   calcium carbonate 2,000 mg TID   carvedilol 25 mg BID   cloNIDine 0.3 mg Q8H   famotidine 20 mg Daily   hydrALAZINE 100 mg Q8H   irbesartan 300 mg Daily   lacosamide 50 mg BID   levETIRAcetam 500 mg BID   polyethylene glycol 17 g Daily   senna-docusate 8.6-50 mg 2 tablet Daily   sodium chloride 2 g BID   tacrolimus 3 mg Daily   tacrolimus 3 mg Daily   PRN    sodium chloride  Q24H PRN   sodium chloride  Q24H PRN   dextrose 50% 12.5 g PRN   dextrose 50% 25 g PRN   glucagon (human recombinant) 1 mg PRN   glucose 16 g PRN   glucose 24 g PRN   hydrALAZINE 10 mg Q4H PRN   insulin aspart U-100 1-10 Units QID (AC + HS) PRN   labetalol 10 mg Q4H PRN   ondansetron 4 mg Q6H PRN   oxyCODONE 5 mg Q6H PRN   sodium chloride 0.9% 10 mL PRN     Today I personally reviewed pertinent medications, lines/drains/airways, imaging, cardiology results, laboratory results, microbiology results, notably:    Recent Labs   Lab 04/30/19  1035   WBC 2.75*   RBC 2.86*   HGB 7.7*   HCT 24.4*   PLT 87*   MCV 85   MCH 26.9*   MCHC 31.6*     Recent Labs   Lab 04/30/19  0011 04/30/19  0512   CALCIUM 5.8*  5.8* 5.7*   PROT 5.7*  --      136 136   K 4.5  4.5 4.4   CO2 21*  21* 21*     102 103   BUN 60*  60* 61*   CREATININE 6.4*  6.4* 6.5*   ALKPHOS 373*  --    ALT 15  --    AST 35  --    BILITOT 0.3  --        Diet  [facility]; Dysphagia (Mechanical Soft)    Assessment/Plan:     Neuro  *  Nontraumatic subcortical hemorrhage of left cerebral hemisphere  S/p urgent left crani for clot evacuation    - Continue to monitor neurochecks q1 hr  - D/Fabian HTS 2%, starting Na tablets 2 gr q12 hr  - target Na 140, monitor BMP q12 hr  - continue levetiracetam 500 mg q12  - target plt >50K w/o active bleeding (NSGY prefers >100K, transfused with 2 units this AM)    Seizures  - Continue home medications: Keppra 500 mg BID, Vimpat 50 mg BID    Cardiac/Vascular  Renovascular hypertension  On Verapamil, Irbesartan, Hydralazine, Coreg, Clonidine at home    - target sBP <140  - on/off cardene gtt   - continued on hydralazine 100 mg q8, carvedilol 25 mg q12  - added clonidine 0.1 mg q8 (4/28), increased to 0.2 mg q8 hr (4/29), will increase to 0.3 mg q8 hr  - resuming home irbesartan 300 mg qd    Renal/  Hypocalcemia  Ca 5.7, iCa 0.75    - increased Calcitriol to 1 mcg q12 hr  - continue Calcium carbonate 2000 mg q8 hr    ESRD on hemodialysis  ESRD w/ HD on MWF as outpatient  AVF in [facility]  Pertinent labs (4/29); BUN/Cr 61/6.5, K 4.4)    - nephrology following, appreciate their assistance  - successful HD yesterday (UF 1.5L), plans for repeating low flow HD tomorrow  - monitor BMP q12 hr    Hematology  Thrombocytopenia  Target plt>50K w/o active bleeding  NSGY prefers >100K, transfused this morning (plt 71K)    Endocrine  Secondary hyperparathyroidism  Nephrology following    GI  Liver replaced by transplant  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    - level from today 2.7 (subtherapeutic)  - hepatology following and adjusting the dose  - continued on tacrolimus 3 mg twice daily        The patient is being Prophylaxed for:  Venous Thromboembolism with: Mechanical  Stress Ulcer with: H2B  Ventilator Pneumonia with: not applicable    Activity Orders          Discontinue arterial line starting at 04/29 1512    Diet renal [facility]; Dysphagia (Mechanical Soft): Renal starting at 04/27 1116    Discontinue  arterial line starting at 04/25 1021        Full Code    Beth Smalls MD  Neurocritical Care  [facility]

## 2019-04-30 NOTE — PLAN OF CARE
Problem: Occupational Therapy Goal  Goal: Occupational Therapy Goal  Goals to be met by: 5/10(2 weeks from initial evaluation)     Patient will increase functional independence with ADLs by performing:    Pt will follow 95% of simple step commands for functional tasks.   Pt will verbally ID 1/3 items for ADL task with added time.   UE Dressing with Set-up Assistance and Supervision.  LE Dressing (pants, brief) with Set-up Assistance and Minimal Assistance.  Grooming while standing with Set-up Assistance and Contact Guard Assistance.  Toileting from toilet with Minimal Assistance for hygiene and clothing management. Met  *revised: with set up and supervision  Toilet transfer to toilet with Minimal Assistance. Met  *revised: with CGA  Functional mobility at short household distance for ADL task with Minimal Assistance. Met  *revised: with CGA     Outcome: Ongoing (interventions implemented as appropriate)  Pt progressing towards goals. RENA Horta 4/30/2019

## 2019-04-30 NOTE — PROGRESS NOTES
[facility]  Nephrology  Progress Note    Patient Name: Holly Patel  MRN: [id number]  Admission Date: 4/22/2019  Hospital Length of Stay: 8 days  Attending Provider: Karson Bermeo MD   Primary Care Physician: Stan Sosa MD  Principal Problem:Nontraumatic subcortical hemorrhage of left cerebral hemisphere    Subjective:     Interval History: HD completed yesterday w/o issues, UF 1.5 L. Patient still with hypocalcemia this AM, CoCa 7.06, iCA 0.75 despite HD treatment on yesterday.     Review of patient's allergies indicates:   Allergen Reactions    Chloral hydrate Hallucinations     Other reaction(s): Hallucinations  Other reaction(s): Hives    Hydrocodone Other (See Comments)     Mental status changes    Tolerates oxycodone     Current Facility-Administered Medications   Medication Frequency    0.9%  NaCl infusion (for blood administration) Q24H PRN    0.9%  NaCl infusion (for blood administration) Q24H PRN    bisacodyl suppository 10 mg Daily    calcitriol solution 1 mcg BID    calcium carbonate 500 mg/5 mL (1,250 mg/5 mL) suspension 2,000 mg TID    carvedilol tablet 25 mg BID    cloNIDine tablet 0.3 mg Q8H    dextrose 50% injection 12.5 g PRN    dextrose 50% injection 25 g PRN    famotidine tablet 20 mg Daily    glucagon (human recombinant) injection 1 mg PRN    glucose chewable tablet 16 g PRN    glucose chewable tablet 24 g PRN    hydrALAZINE injection 10 mg Q4H PRN    hydrALAZINE tablet 100 mg Q8H    insulin aspart U-100 pen 1-10 Units QID (AC + HS) PRN    labetalol 20 mg/4 mL (5 mg/mL) IV syring Q4H PRN    lacosamide tablet 50 mg BID    levETIRAcetam tablet 500 mg BID    ondansetron disintegrating tablet 4 mg Q6H PRN    oxyCODONE immediate release tablet 5 mg Q6H PRN    polyethylene glycol packet 17 g Daily    senna-docusate 8.6-50 mg per tablet 2 tablet Daily    sodium chloride 0.9% flush 10 mL PRN    sodium chloride tablet 2 g BID    tacrolimus  (PROGRAF) 1 mg/mL oral syringe Daily    tacrolimus (PROGRAF) 1 mg/mL oral syringe Daily       Objective:     Vital Signs (Most Recent):  Temp: 97.9 °F (36.6 °C) (04/30/19 0745)  Pulse: 67 (04/30/19 1005)  Resp: 20 (04/30/19 1005)  BP: (!) 150/83 (04/30/19 1122)  SpO2: 99 % (04/30/19 1005)  O2 Device (Oxygen Therapy): nasal cannula (04/30/19 1005) Vital Signs (24h Range):  Temp:  [97.5 °F (36.4 °C)-98 °F (36.7 °C)] 97.9 °F (36.6 °C)  Pulse:  [61-71] 67  Resp:  [11-23] 20  SpO2:  [95 %-100 %] 99 %  BP: (132-191)/() 150/83  Arterial Line BP: (118-169)/(65-86) 169/84     Weight: 54.8 kg (120 lb 13 oz) (04/22/19 1100)  Body mass index is 22.1 kg/m².  Body surface area is 1.55 meters squared.    I/O last 3 completed shifts:  In: 2535.6 [P.O.:300; I.V.:1288.6; Blood:247; Other:500; IV Piggyback:200]  Out: 2000 [Other:2000]    Physical Exam   Constitutional: She is oriented to person, place, and time. She appears well-developed. She is sleeping. She is easily aroused. No distress.   HENT:   Right Ear: External ear normal.   Left Ear: External ear normal.   Eyes: Conjunctivae and EOM are normal. Right eye exhibits no discharge. Left eye exhibits no discharge.   Neck: Normal range of motion. Neck supple.   Cardiovascular: Normal rate and regular rhythm. Exam reveals no gallop and no friction rub.   No murmur heard.  Pulmonary/Chest: Effort normal and breath sounds normal. No respiratory distress. She has no wheezes. She has no rales.   Abdominal: Soft. Bowel sounds are normal. She exhibits no distension. There is no tenderness.   Musculoskeletal: Normal range of motion. She exhibits no edema or deformity.   Neurological: She is alert, oriented to person, place, and time and easily aroused.   Skin: Skin is warm and dry. She is not diaphoretic.   Psychiatric: She has a normal mood and affect. Her behavior is normal.       Significant Labs:  CBC:   Recent Labs   Lab 04/30/19  1035   WBC 2.75*   RBC 2.86*   HGB 7.7*   HCT  24.4*   PLT 87*   MCV 85   MCH 26.9*   MCHC 31.6*     CMP:   Recent Labs   Lab 04/30/19  0011 04/30/19  0512   *  138* 109   CALCIUM 5.8*  5.8* 5.7*   ALBUMIN 2.3*  --    PROT 5.7*  --      136 136   K 4.5  4.5 4.4   CO2 21*  21* 21*     102 103   BUN 60*  60* 61*   CREATININE 6.4*  6.4* 6.5*   ALKPHOS 373*  --    ALT 15  --    AST 35  --    BILITOT 0.3  --           Assessment/Plan:     * Nontraumatic subcortical hemorrhage of left cerebral hemisphere  - Being follow by admitting service     ESRD on hemodialysis  ESRD on iHD TTS  [facility]  Dr. Bass  3.5 hours  EDW ~ 50 kg  LFA AVF    Plan/Recommendations:  -Will plan for dialysis tomorrow for metabolic clearance and volume management   -Will likely dialyze using low flow HD again   -Patient still with hypocalcemia, CoCa 7.06, will increase Calcitriol 1 mcg BID  -Consider IV replacements as needed   -continue strict I/O's  -daily renal panel with phos added      Case discussed with staff further recs with attestation.     I will follow-up with patient. Please contact us if you have any additional questions.    TED Russell DNP, APRN, FNP-C  Department of Nephrology  [facility]  Pager: [phone]

## 2019-04-30 NOTE — ASSESSMENT & PLAN NOTE
ESRD on iHD TTS  [facility]  Dr. Bass  3.5 hours  EDW ~ 50 kg  LFA AVF    Plan/Recommendations:  -Will plan for dialysis tomorrow for metabolic clearance and volume management   -Will likely dialyze using low flow HD again   -Patient still with hypocalcemia, CoCa 7.06, will increase Calcitriol 1 mcg BID  -Consider IV replacements as needed   -continue strict I/O's  -daily renal panel with phos added

## 2019-04-30 NOTE — ASSESSMENT & PLAN NOTE
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

## 2019-04-30 NOTE — PLAN OF CARE
OFELIA spoke with Piper from [facility] 4/30 11:00AM. Piper advised she has completed her assessment and will report back to the facility physician. Piper reported that Pt looks like a good candidate for the program; Piper will report back about placement ASAP.    Karla Kaplan, TANIYA  [facility]

## 2019-04-30 NOTE — PLAN OF CARE
Problem: Adult Inpatient Plan of Care  Goal: Plan of Care Review  Outcome: Revised  POC reviewed with pt and family at 0500. Pt cannot verbalize complete understanding. No family at bedside. Questions and concerns addressed. Pt. Neurologically unchanged. HR and Ox sats in b/w normal parameters. BP poorly controlled with PRN medication. No BM. Will continue to monitor. See flowsheets for full assessment and VS info.

## 2019-04-30 NOTE — TREATMENT PLAN
Treatment Plan  04/30/2019  1:55 PM    Chart reviewed and case discussed with attending.     We are following Ms. Patel for IS and recommendations are:  --Daily CMP, CBC, and INR   --Daily Tacrolimus level  --Continue current dose of Tacrolimus  --We will continue to follow alongside primary team (please promptly notify us of discharge in order to arrange for appropriate outpatient follow up).    Elinor Medeiros M.D.  Gastroenterology Fellow, PGY-V  Pager: [phone]  [facility]

## 2019-04-30 NOTE — PT/OT/SLP PROGRESS
"Speech Language Pathology Treatment    Patient Name:  Holly Patel   MRN:  [id number]  Admitting Diagnosis: Nontraumatic subcortical hemorrhage of left cerebral hemisphere    Recommendations:                 General Recommendations:  Dysphagia therapy, Speech/language therapy and Cognitive-linguistic therapy  Diet recommendations:  Dental Soft, Liquid Diet Level: Thin   Aspiration Precautions: Eliminate distractions, Feed only when awake/alert, HOB to 90 degrees, Monitor for s/s of aspiration and Strict aspiration precautions   General Precautions: Standard, aphasia, aspiration, fall, seizure  Communication strategies:  yes/no questions only and provide increased time to answer    Subjective     " Can you put the bed down?"  Patient goals:did not state    Pain/Comfort:  · Pain Rating 1: 0/10  · Pain Rating Post-Intervention 1: 0/10    Objective:     Has the patient been evaluated by SLP for swallowing?   Yes  Keep patient NPO? No   Current Respiratory Status: room air      Pt seen bedside with no family present. Pt asleep but did rouse with cues. Observed pt taking liquid medications and drinking thin liquids from a straw. Pt with anterior bolus loss but no overt s/s of aspiration. Nursing reported pt ate cereal this am without difficulty. Reviewed swallowing precautions with pt who needs reinforcement. Pt was oriented to name and . When asked month and year, pt perseverated on her . Pt responded with "2018" when asked the year. Pt did not cues to keep eyes open and stay awake but pt more awake toward end of session. She named objects with 20% acc and with c ues 90% acc. Significant perseverations noted. SHe completed responsive speech questions with 80% acc given max cues. Pt able to name 2 items in a category with 50% acc given increased time. She was unable to name members described. She followed one step commands with 80% acc given repetition but did not follow any 2 step commands. She responded to " simple y/n questions with 70% acc and with cues 80% acc. White board updated and education re: aphasia provided.     Assessment:     Holly Patel is a 28 y.o. female with an SLP diagnosis of Aphasia, Dysarthria and Cognitive-Linguistic Impairment.  She presents with progress towards goals.    Goals:   Multidisciplinary Problems     SLP Goals        Problem: SLP Goal    Goal Priority Disciplines Outcome   SLP Goal     SLP Ongoing (interventions implemented as appropriate)   Description:  Goals to be met 5/2    1. Pt will tolerate diet of thin liquids and dental soft solids without overt clinical signs of aspiration   2. Pt will participate in trials of regular solids within speech therapy sessions to help determine least restrictive diet   3. Pt will demonstrate orientation to place, situation , family members, date w/ min cues   4. Pt will complete category inclusions task w/ 80% acc w/ min cues   5. Pt will generate 3 items per category w/ min cues to enhance organizations skills   6. Pt will label items w/ 80%acc w/ mincues to enhance verbal expression skills   7. Pt will participate in ongoing assessment of speech language and cognitive linguistic skills to help rule out deficits and determine therapeutic plan of care                       Plan:     · Patient to be seen:  4 x/week   · Plan of Care expires:     · Plan of Care reviewed with:  patient   · SLP Follow-Up:  Yes       Discharge recommendations:  rehabilitation facility       Time Tracking:     SLP Treatment Date:   04/30/19  Speech Start Time:  0945  Speech Stop Time:  1001     Speech Total Time (min):  16 min    Billable Minutes: Speech Therapy Individual 8 and Treatment Swallowing Dysfunction 8    ROM Donald, CCC-SLP  04/30/2019

## 2019-04-30 NOTE — PROGRESS NOTES
Hemodialysis x 3 hours complete. 1.5 liter net fluid removal. Blood returned without difficulty. Needles removed and pressure held x 10 minutes until hemostasis achieved. Gauze pressure dressing applied and secured with tape. Positive bruit/thrill noted.

## 2019-04-30 NOTE — SUBJECTIVE & OBJECTIVE
Interval History:  Please see hospital course    Review of Systems   Constitutional: Negative for fever.   HENT: Negative for drooling and trouble swallowing.    Respiratory: Negative for choking and shortness of breath.    Gastrointestinal: Negative for nausea and vomiting.   Allergic/Immunologic: Positive for immunocompromised state.   Neurological: Positive for speech difficulty and weakness. Negative for seizures.   Psychiatric/Behavioral: Positive for confusion. Negative for agitation.       Objective:     Vitals:  Temp: 98 °F (36.7 °C)  Pulse: 71  Rhythm: normal sinus rhythm  BP: (!) 129/58  MAP (mmHg): 83  Resp: 19  SpO2: (!) 92 %  Oxygen Concentration (%): 28  O2 Device (Oxygen Therapy): nasal cannula    Temp  Min: 97.5 °F (36.4 °C)  Max: 98 °F (36.7 °C)  Pulse  Min: 61  Max: 75  BP  Min: 129/58  Max: 191/91  MAP (mmHg)  Min: 83  Max: 128  Resp  Min: 11  Max: 23  SpO2  Min: 92 %  Max: 100 %  Oxygen Concentration (%)  Min: 28  Max: 28    04/29 0701 - 04/30 0700  In: 2111.2 [P.O.:300; I.V.:964.2]  Out: 2000    Unmeasured Output  Stool Occurrence: 1       Physical Exam   Constitutional: She appears well-developed. She appears lethargic. She is cooperative. She appears ill. No distress.   HENT:   Head: Normocephalic.   Eyes: Pupils are equal, round, and reactive to light.   Periorbital edema   Neck: Normal range of motion.   Cardiovascular: Normal rate and regular rhythm.   Pulmonary/Chest: Effort normal. No tachypnea. No respiratory distress.   Abdominal: Soft. She exhibits no distension.   Musculoskeletal: Normal range of motion. She exhibits no edema.   Neurological: She appears lethargic. She displays no tremor. She displays no seizure activity.   More awake, sitting in bed and eating breakfast this AM  Mild aphasia, Follows simple commands  PERRL, brisk, EOM intact  Moves all 4 extremities       Skin: Skin is warm. She is not diaphoretic.   Psychiatric: She is slowed. She is inattentive.   Vitals  reviewed.    Unable to test orientation, coordination, gait due to level of consciousness.    Medications:  Continuous    nicardipine Last Rate: 1 mg/hr (04/30/19 1456)   Scheduled    bisacodyl 10 mg Daily   calcitriol 1 mcg BID   calcium carbonate 2,000 mg TID   carvedilol 25 mg BID   cloNIDine 0.3 mg Q8H   famotidine 20 mg Daily   hydrALAZINE 100 mg Q8H   irbesartan 300 mg Daily   lacosamide 50 mg BID   levETIRAcetam 500 mg BID   polyethylene glycol 17 g Daily   senna-docusate 8.6-50 mg 2 tablet Daily   sodium chloride 2 g BID   tacrolimus 3 mg Daily   tacrolimus 3 mg Daily   PRN    sodium chloride  Q24H PRN   sodium chloride  Q24H PRN   dextrose 50% 12.5 g PRN   dextrose 50% 25 g PRN   glucagon (human recombinant) 1 mg PRN   glucose 16 g PRN   glucose 24 g PRN   hydrALAZINE 10 mg Q4H PRN   insulin aspart U-100 1-10 Units QID (AC + HS) PRN   labetalol 10 mg Q4H PRN   ondansetron 4 mg Q6H PRN   oxyCODONE 5 mg Q6H PRN   sodium chloride 0.9% 10 mL PRN     Today I personally reviewed pertinent medications, lines/drains/airways, imaging, cardiology results, laboratory results, microbiology results, notably:    Recent Labs   Lab 04/30/19  1035   WBC 2.75*   RBC 2.86*   HGB 7.7*   HCT 24.4*   PLT 87*   MCV 85   MCH 26.9*   MCHC 31.6*     Recent Labs   Lab 04/30/19  0011 04/30/19  0512   CALCIUM 5.8*  5.8* 5.7*   PROT 5.7*  --      136 136   K 4.5  4.5 4.4   CO2 21*  21* 21*     102 103   BUN 60*  60* 61*   CREATININE 6.4*  6.4* 6.5*   ALKPHOS 373*  --    ALT 15  --    AST 35  --    BILITOT 0.3  --        Diet  Diet renal [facility]; Dysphagia (Mechanical Soft)  Diet renal [facility]; Dysphagia (Mechanical Soft)

## 2019-04-30 NOTE — PT/OT/SLP PROGRESS
Occupational Therapy   Treatment    Name: Holly Patel  MRN: [id number]  Admitting Diagnosis:  Nontraumatic subcortical hemorrhage of left cerebral hemisphere  8 Days Post-Op    Recommendations:     Discharge Recommendations: rehabilitation facility  Discharge Equipment Recommendations:  none  Barriers to discharge:  Other (Comment)(safety risk; level of skilled assistance required; remains in ICU)    Assessment:     Holly Patel is a 28 y.o. female with a medical diagnosis of Nontraumatic subcortical hemorrhage of left cerebral hemisphere.  She presents with aphasia and poor endurance. Moreover, pt with noted apraxia and poor sequencing with functional task completion on this date. Cont to rec Rehab at this time Performance deficits affecting function are impaired endurance, impaired self care skills, impaired functional mobilty, gait instability, impaired balance, impaired cognition, impaired cardiopulmonary response to activity, other (comment)(aphasia).     Rehab Prognosis:  Good; patient would benefit from acute skilled OT services to address these deficits and reach maximum level of function.       Plan:     Patient to be seen 4 x/week to address the above listed problems via self-care/home management, therapeutic activities, therapeutic exercises, neuromuscular re-education, cognitive retraining  · Plan of Care Expires: 05/25/19  · Plan of Care Reviewed with: patient    Subjective     Pain/Comfort:  · Pain Rating 1: 0/10(c/o upset stomach)  · Pain Rating Post-Intervention 1: 0/10    Objective:     Communicated with: RN prior to session. Pt's mother at bedside throughout. Completed with PT in ICU setting. Patient found HOB elevated with telemetry,oxygen, pulse ox (continuous), blood pressure cuff, peripheral IV upon OT entry to room.    General Precautions: Standard, aphasia, aspiration, fall, seizure, dental soft(renal)   Orthopedic Precautions:N/A   Braces: N/A     Occupational Performance:    Bed Mobility:    · Patient completed Rolling/Turning to Right with contact guard assistance and with side rail  · Patient completed Supine to Sit with minimum assistance for sequencing of task    Functional Mobility/Transfers:  · Patient completed Sit <> Stand Transfer with contact guard assistance  with  no assistive device   · Patient completed Bed <> Chair Transfer using Step Transfer technique with minimum assistance with hand-held assist  · Patient completed Toilet Transfer Step Transfer technique with minimum assistance with  no AD  · Functional Mobility: household distances x2 with min(A)    Activities of Daily Living:  · Grooming: unable to complete in standing following toileting 2/2 fatigue; completed with set up and min(A) while seated in chair-- oral care; apraxia with task noted and poor sequencing. requiring oral suction     · Lower Body Dressing: moderate assistance for initiation and sequencing  · Toileting: contact guard assistance and set up for perineal care    [facility] 6 Click ADL: 18    Treatment & Education:  -Pt alert and agreeable to therapy session; reported orientation to person only; provided verbal orientation   -Lights turned on for session; encouraged to pt/family for lights on during the day time hours  -Pt able to ID colors 2/2 trials; able to report 2 children's names-- requiring mod cues for grades  -Completed mobility to bathroom<>toileting<>attempt for standing grooming<>requiring task completion while seated in chair  -Communication board updated; questions/concerns addressed within OT scope of practice      Patient left up in chair with all lines intact, call button in reach, RN notified and mother presentEducation:      GOALS:   Multidisciplinary Problems     Occupational Therapy Goals        Problem: Occupational Therapy Goal    Goal Priority Disciplines Outcome Interventions   Occupational Therapy Goal     OT, PT/OT Ongoing (interventions implemented as appropriate)     Description:  Goals to be met by: 5/10(2 weeks from initial evaluation)     Patient will increase functional independence with ADLs by performing:    Pt will follow 95% of simple step commands for functional tasks.   Pt will verbally ID 1/3 items for ADL task with added time.   UE Dressing with Set-up Assistance and Supervision.  LE Dressing (pants, brief) with Set-up Assistance and Minimal Assistance.  Grooming while standing with Set-up Assistance and Contact Guard Assistance.  Toileting from toilet with Minimal Assistance for hygiene and clothing management. Met  *revised: with set up and supervision  Toilet transfer to toilet with Minimal Assistance. Met  *revised: with CGA  Functional mobility at short household distance for ADL task with Minimal Assistance. Met  *revised: with CGA                       Time Tracking:     OT Date of Treatment: 04/30/19  OT Start Time: 1100  OT Stop Time: 1123  OT Total Time (min): 23 min    Billable Minutes:Self Care/Home Management 23    RENA Horta  4/30/2019

## 2019-05-01 PROBLEM — E21.0: Status: RESOLVED | Noted: 2019-01-01 | Resolved: 2019-01-01

## 2019-05-01 NOTE — PROGRESS NOTES
[facility]  Neurocritical Care  Progress Note    Admit Date: 4/22/2019  Service Date: 05/01/2019  Length of Stay: 9    Subjective:     Chief Complaint: Nontraumatic subcortical hemorrhage of left cerebral hemisphere    History of Present Illness: 27 yo F w/ PMH significant for liver transplant in 1991, recent thyroidectomy on 3/27/2019, ESRD on HD (MWF), and epilepsy who presents to [facility] for higher level of care following planned surgical excision of L calvarial lesion s/p excision and cranioplasty 4/22/19. The pt presented to [facility] on 03/12/2019 with a complaint of headaches, among other symptoms, and received a workup that included a CT head which showed a left temporal calvarium lesion with mass effect. At that time she shared that she continued to have a headache, noting the pain was usually over the left temporal aspect of her head but migrated over to the right temporal aspect. She states the pain on the right is exacerbated with palpation of the area. She has no additional symptomatic complaints at this time. Currently stable following surgery. Denies acute pain, otherwise comfortable w/ non-focal neurological exam.         Hospital Course: 4/22: Admitted to [facility]. Initiated cardene gtt to maintain SBP <140. Consulted hepatology and nephrology. Acute worsening of neuro exam. STAT CT revealed enlarging hematoma w/ midline shift. Urgently taken down to OR for evacuation. Return from OR intubated.   4/23: Neuro exam improved from previous. Somnolent but rousable. Following commands. Moving all extremities spontaneously; RUE 4/5. Pending CRRT vs HD today.  04/24/2019: no acute adverse events overnight  4/27: KUB, resume oral feeds, ionized Ca, HD over night successful, increase hydralazine, decrease HTS  4/28: D/C HTS, increase hydralazine, likely add clonidine today as well, wean cardene, suppository  4/29: slight improvement in neuro exam, following commands. Started on HTS 2%. Increased  clonidine and weaned off of cardene this AM, D/Fabian A-line. INR remains elevated. Shifted potassium. nephro with plans for slow HD.   4/30: neurologically unchanged. intermittently on cardene, increase clonidine to 0.3 TID, resuming home irbesartan. D/Fabian HTS 2%, started Na tablets. Transfused with plt x2 by NSGY.  05/01/2019 Patient remains aphasic,slightly worse than prior. No other focal neurologic deficits. Tolerating HD well. SBP goal <160, will attempt to wean off cardene. Awaiting Plt >100 to start heparin SQ. Will obtain a CT scan if any other neurological changes are observed.       Interval History:  Please see hospital course    Review of Systems   Constitutional: Negative for fever.   HENT: Negative for drooling and trouble swallowing.    Respiratory: Negative for choking and shortness of breath.    Gastrointestinal: Negative for nausea and vomiting.   Allergic/Immunologic: Positive for immunocompromised state.   Neurological: Positive for speech difficulty and weakness. Negative for seizures.   Psychiatric/Behavioral: Positive for confusion. Negative for agitation.       Objective:     Vitals:  Temp: 97.8 °F (36.6 °C)  Pulse: 66  Rhythm: normal sinus rhythm  BP: 134/74  MAP (mmHg): 98  Resp: 19  SpO2: 97 %  Oxygen Concentration (%): 28  O2 Device (Oxygen Therapy): nasal cannula    Temp  Min: 97.8 °F (36.6 °C)  Max: 98.5 °F (36.9 °C)  Pulse  Min: 64  Max: 72  BP  Min: 113/56  Max: 156/78  MAP (mmHg)  Min: 75  Max: 118  Resp  Min: 17  Max: 24  SpO2  Min: 92 %  Max: 99 %  Oxygen Concentration (%)  Min: 28  Max: 28    04/30 0701 - 05/01 0700  In: 2083 [P.O.:500; I.V.:667.2]  Out: -    Unmeasured Output  Stool Occurrence: 1       Physical Exam   Constitutional: She appears well-developed. She appears lethargic. She is cooperative. She appears ill. No distress.   HENT:   Head: Normocephalic.   Eyes: Pupils are equal, round, and reactive to light.   Periorbital edema   Neck: Normal range of motion.    Cardiovascular: Normal rate and regular rhythm.   Pulmonary/Chest: Effort normal. No tachypnea. No respiratory distress.   Abdominal: Soft. She exhibits no distension.   Musculoskeletal: Normal range of motion. She exhibits no edema.   Neurological: She appears lethargic. She displays no tremor. She displays no seizure activity.   Neurologically stable although remains aphasic.  Able to follow some simple commands but difficulty w/ naming objects. She was lethargic as she just recently woke up.   PERRL, brisk, EOM intact  Moves all 4 extremities       Skin: Skin is warm. She is not diaphoretic.   Psychiatric: She is slowed. She is inattentive.   Vitals reviewed.    Unable to test orientation, coordination, gait due to level of consciousness.    Medications:  Continuous    nicardipine Last Rate: 2.5 mg/hr (05/01/19 0905)   Scheduled    sodium chloride 0.9%  Once   bisacodyl 10 mg Daily   calcitriol 1 mcg BID   calcium carbonate 2,000 mg TID   carvedilol 25 mg BID   cloNIDine 0.3 mg Q8H   famotidine 20 mg Daily   hydrALAZINE 100 mg Q8H   irbesartan 300 mg Daily   lacosamide 50 mg BID   levETIRAcetam 500 mg BID   polyethylene glycol 17 g Daily   senna-docusate 8.6-50 mg 2 tablet Daily   sodium chloride 2 g BID   tacrolimus 3 mg Daily   tacrolimus 3 mg Daily   PRN    sodium chloride  Q24H PRN   sodium chloride  Q24H PRN   dextrose 50% 12.5 g PRN   dextrose 50% 25 g PRN   glucagon (human recombinant) 1 mg PRN   glucose 16 g PRN   glucose 24 g PRN   hydrALAZINE 10 mg Q4H PRN   insulin aspart U-100 1-10 Units QID (AC + HS) PRN   labetalol 10 mg Q4H PRN   ondansetron 4 mg Q6H PRN   oxyCODONE 5 mg Q6H PRN   sodium chloride 0.9% 10 mL PRN     Today I personally reviewed pertinent medications, lines/drains/airways, imaging, cardiology results, laboratory results, microbiology results, notably:    Recent Labs   Lab 05/01/19  0046   WBC 4.09   RBC 3.28*   HGB 8.9*   HCT 27.0*   PLT 91*   MCV 82   MCH 27.1   MCHC 33.0      Recent Labs   Lab 05/01/19  0046 05/01/19  0803   CALCIUM 5.4* 5.5*   PROT 5.6*  --     137   K 4.8 5.0   CO2 20* 20*    105   BUN 66* 67*   CREATININE 7.6* 7.8*   ALKPHOS 363*  --    ALT 13  --    AST 26  --    BILITOT 0.4  --        Diet  Diet renal [facility]; Dysphagia (Mechanical Soft)  Diet renal [facility]; Dysphagia (Mechanical Soft)        Assessment/Plan:     Neuro  * Nontraumatic subcortical hemorrhage of left cerebral hemisphere  S/p urgent left crani for clot evacuation    - Continue to monitor neurochecks q1 hr  - D/Fabian HTS 2%, starting Na tablets 2 gr q12 hr  - target Na 140, monitor BMP q12 hr  - continue levetiracetam 500 mg q12  - target plt >50K w/o active bleeding (NSGY prefers >100K, transfused with 2 units this AM)   - No SQ heparin      Seizures  - Continue home medications: Keppra 500 mg BID, Vimpat 50 mg BID    Cardiac/Vascular  Renovascular hypertension  On Verapamil, Irbesartan, Hydralazine, Coreg, Clonidine at home    - target sBP <160  - On/off cardene gtt; attempting to wean   - Continued on hydralazine 100 mg q8, carvedilol 25 mg q12  - Added clonidine 0.1 mg q8 (4/28), increased to 0.2 mg q8 hr (4/29), will increase to 0.3 mg q8 hr  - Continue irbesartan 300 mg qd            Renal/  Hypocalcemia  Ca 5.5, iCa 0.67  - Continue Calcitriol to 1 mcg q12 hr  - Continue Calcium carbonate 2000 mg q8 hr  - Nephrology following     ESRD on hemodialysis  ESRD w/ HD on MWF as outpatient  AVF in [facility]    - Nephrology following, appreciate their assistance  - Low flow HD on 05/01; tolerating well this AM   - monitor BMP q12 hr      Hematology  Thrombocytopenia  - Target plt>50K w/o active bleeding  = NSGY prefers >100K, transfused this morning (plt 71K)  - Plt 91 this AM; will hold off on transfusing at this time     Endocrine  Secondary hyperparathyroidism  - Nephrology following    GI  Liver replaced by transplant  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

## 2019-05-01 NOTE — PROGRESS NOTES
[facility]  Nephrology  Progress Note    Patient Name: Holly Patel  MRN: [id number]  Admission Date: 4/22/2019  Hospital Length of Stay: 9 days  Attending Provider: Karson Bermeo MD   Primary Care Physician: Stan Sosa MD  Principal Problem:Nontraumatic subcortical hemorrhage of left cerebral hemisphere    Subjective:     HPI: Ms. Holly Patel is a 27 yo AAF with Tertiary hyperparathyroidism s/p partial parathyroidectomy, HTN, s/p OLTx in 2012, and ESRD on iHD TTS, and known history of non-compliance with medication regimen who is now s/p surgical excision of L calvarial lesion s/p excision and cranioplasty 4/22/19.  The pt presented to [facility] on 03/12/2019 with a complaint of headaches, among other symptoms, and received a workup that included a CTH which showed a left temporal calvarium lesion with mass effect.  She continued to suffer from headaches and NSGY scheduled patient for excision and cranioplasy.  She tolerated surgery well and was transferred to the [facility] for further monitoring.  Nephrology has been consulted for ESRD management while in-patient.     She normally  dialyzes at [facility] on MWF under the care of Dr. Bass.  She dialyzes for 3.5 hours and reports an EDW ~ 50 kg.  She has an AVF to her LFA.  She no longer makes urine.  Her last HD treatment was on Saturday (4/20).          Interval History: Last HD on Monday w/o issues. Patient restless but oriented when awaken on exam. Still with hypocalcemia, CoCa 6.8. iCA pending.     Review of patient's allergies indicates:   Allergen Reactions    Chloral hydrate Hallucinations     Other reaction(s): Hallucinations  Other reaction(s): Hives    Hydrocodone Other (See Comments)     Mental status changes    Tolerates oxycodone     Current Facility-Administered Medications   Medication Frequency    0.9%  NaCl infusion (for blood administration) Q24H PRN    0.9%  NaCl infusion (for blood administration) Q24H PRN     bisacodyl suppository 10 mg Daily    calcitriol solution 1 mcg BID    calcium carbonate 500 mg/5 mL (1,250 mg/5 mL) suspension 2,000 mg TID    carvedilol tablet 25 mg BID    cloNIDine tablet 0.3 mg Q8H    dextrose 50% injection 12.5 g PRN    dextrose 50% injection 25 g PRN    famotidine tablet 20 mg Daily    glucagon (human recombinant) injection 1 mg PRN    glucose chewable tablet 16 g PRN    glucose chewable tablet 24 g PRN    hydrALAZINE injection 10 mg Q4H PRN    hydrALAZINE tablet 100 mg Q8H    insulin aspart U-100 pen 1-10 Units QID (AC + HS) PRN    irbesartan tablet 300 mg Daily    labetalol 20 mg/4 mL (5 mg/mL) IV syring Q4H PRN    lacosamide tablet 50 mg BID    levETIRAcetam tablet 500 mg BID    niCARdipine 40 mg/200 mL infusion Continuous    ondansetron disintegrating tablet 4 mg Q6H PRN    oxyCODONE immediate release tablet 5 mg Q6H PRN    polyethylene glycol packet 17 g Daily    senna-docusate 8.6-50 mg per tablet 2 tablet Daily    sodium chloride 0.9% flush 10 mL PRN    sodium chloride tablet 2 g BID    tacrolimus (PROGRAF) 1 mg/mL oral syringe Daily    tacrolimus (PROGRAF) 1 mg/mL oral syringe Daily       Objective:     Vital Signs (Most Recent):  Temp: 97.8 °F (36.6 °C) (05/01/19 0705)  Pulse: 70 (05/01/19 0820)  Resp: (!) 21 (05/01/19 0820)  BP: 127/72 (05/01/19 0820)  SpO2: (!) 92 % (05/01/19 0820)  O2 Device (Oxygen Therapy): nasal cannula (05/01/19 0820) Vital Signs (24h Range):  Temp:  [97.8 °F (36.6 °C)-98.5 °F (36.9 °C)] 97.8 °F (36.6 °C)  Pulse:  [65-75] 70  Resp:  [17-24] 21  SpO2:  [92 %-100 %] 92 %  BP: (114-156)/(57-94) 127/72     Weight: 54.8 kg (120 lb 13 oz) (04/22/19 1100)  Body mass index is 22.1 kg/m².  Body surface area is 1.55 meters squared.    I/O last 3 completed shifts:  In: 2931.6 [P.O.:500; I.V.:1268.9; Blood:1162.8]  Out: -     Physical Exam   Constitutional: She is oriented to person, place, and time. She appears well-developed. She is  sleeping. She is easily aroused. No distress.   HENT:   Right Ear: External ear normal.   Left Ear: External ear normal.   Eyes: Conjunctivae and EOM are normal. Right eye exhibits no discharge. Left eye exhibits no discharge.   Neck: Normal range of motion. Neck supple.   Cardiovascular: Normal rate and regular rhythm. Exam reveals no gallop and no friction rub.   No murmur heard.  Pulmonary/Chest: Effort normal and breath sounds normal. No respiratory distress. She has no wheezes. She has no rales.   Abdominal: Soft. Bowel sounds are normal. She exhibits no distension. There is no tenderness.   Musculoskeletal: Normal range of motion. She exhibits no edema or deformity.   Neurological: She is oriented to person, place, and time and easily aroused.   Skin: Skin is warm and dry. She is not diaphoretic.   Psychiatric: She has a normal mood and affect. Her behavior is normal.     Significant Labs:  CBC:   Recent Labs   Lab 05/01/19  0046   WBC 4.09   RBC 3.28*   HGB 8.9*   HCT 27.0*   PLT 91*   MCV 82   MCH 27.1   MCHC 33.0     CMP:   Recent Labs   Lab 05/01/19  0046      CALCIUM 5.4*   ALBUMIN 2.2*   PROT 5.6*      K 4.8   CO2 20*      BUN 66*   CREATININE 7.6*   ALKPHOS 363*   ALT 13   AST 26   BILITOT 0.4            Assessment/Plan:     * Nontraumatic subcortical hemorrhage of left cerebral hemisphere  - Being follow by admitting service     Hypocalcemia  2/2 non-compliance with activated vit D and calcium supplementation s/p parathyroidectomy  -PTH elevated @ 407  -phos levels daily  -continue calcitriol 1 mcg po BID  -continue Calcium Carbonate 2 g TID   -calcium gluconate 3 mg IVPB x 1 now      ESRD on hemodialysis  ESRD on iHD TTS  [facility]  Dr. Bass  3.5 hours  EDW ~ 50 kg  LFA AVF    Plan/Recommendations:  -Will plan for dialysis today for metabolic clearance and volume management   -Target UF 1-1.5 L as tolerated, keep MAP >65  -Will likely dialyze using low flow HD again   -continue  strict I/O's  -daily renal panel with phos added      Case discussed with staff further recs with attestation.     I will follow-up with patient. Please contact us if you have any additional questions.    TED Russell DNP, APRN, FNP-C  Department of Nephrology  [facility]  Pager: [phone]

## 2019-05-01 NOTE — PROGRESS NOTES
[facility]  Physical Medicine & Rehab  Progress Note    Patient Name: Holly Patel  MRN: [id number]  Admission Date: 4/22/2019  Length of Stay: 9 days  Attending Physician: Karson Bermeo MD    Subjective:     Principal Problem:Nontraumatic subcortical hemorrhage of left cerebral hemisphere    Hospital Course:   04/26/2019: Participated with OT.  Bed mobility CGA-Maddy.  Sit to stand and transfers Maddy.  Ambulated  4 steps Maddy with mild dizziness.  UBD/grooming Maddy and Feeding CGA.  04/29/2019: Participated with PT.  Bed mobility Maddy.  Sit to stand and transfers Maddy.  Ambulated 5 steps Maddy. Passed bedside swallow evaluation.  SLP recommending dental soft diet and thin liquids. SLP diagnosis of Aphasia and Cognitive-Linguistic Impairment.   04/30/2019: Participated with therapy.  Bed mobility CGA-Maddy.  Sit to stand CGA and transfers Maddy.  Ambulated 12 ft and 12 ft Maddy-CGA. No grooming.     Interval History 5/1/2019:  Patient is seen for follow-up rehab evaluation and recommendations:   CRRT ongoing upon entering room. Patient is more alert today, but somnolence still present. Participating with therapy.     HPI, Past Medical, Family, and Social History remains the same as documented in the initial encounter.    Scheduled Medications:    sodium chloride 0.9%   Intravenous Once    bisacodyl  10 mg Rectal Daily    calcitriol  1 mcg Oral BID    calcium carbonate  2,000 mg Oral TID    carvedilol  25 mg Oral BID    cloNIDine  0.3 mg Oral Q8H    famotidine  20 mg Oral Daily    hydrALAZINE  100 mg Oral Q8H    irbesartan  300 mg Oral Daily    lacosamide  50 mg Oral BID    levETIRAcetam  500 mg Oral BID    polyethylene glycol  17 g Oral Daily    senna-docusate 8.6-50 mg  2 tablet Oral Daily    sodium chloride  2 g Oral BID    tacrolimus  3 mg Oral Daily    tacrolimus  3 mg Oral Daily       Diagnostic Results: Labs: Reviewed    PRN Medications: sodium chloride, sodium  chloride, dextrose 50%, dextrose 50%, glucagon (human recombinant), glucose, glucose, hydrALAZINE, insulin aspart U-100, labetalol, ondansetron, oxyCODONE, sodium chloride 0.9%    Review of Systems   Constitutional: Positive for activity change.   HENT: Positive for trouble swallowing.    Respiratory: Negative for cough and shortness of breath.    Cardiovascular: Negative for chest pain and palpitations.   Musculoskeletal: Positive for gait problem.   Allergic/Immunologic: Positive for immunocompromised state.   Neurological: Positive for speech difficulty and weakness.   Psychiatric/Behavioral: Positive for confusion. Negative for agitation.     Objective:     Vital Signs (Most Recent):  Temp: 97.8 °F (36.6 °C) (05/01/19 0705)  Pulse: 64 (05/01/19 1045)  Resp: 19 (05/01/19 1045)  BP: 128/65 (05/01/19 1045)  SpO2: 96 % (05/01/19 1045)    Vital Signs (24h Range):  Temp:  [97.8 °F (36.6 °C)-98.5 °F (36.9 °C)] 97.8 °F (36.6 °C)  Pulse:  [64-75] 64  Resp:  [17-24] 19  SpO2:  [92 %-100 %] 96 %  BP: (113-156)/(56-94) 128/65     Physical Exam   Constitutional: She appears well-developed and well-nourished.   Receiving CRRT    HENT:   Head: Normocephalic and atraumatic.   Eyes: Right eye exhibits no discharge. Left eye exhibits no discharge.   Neck: Neck supple.   Cardiovascular: Normal rate and intact distal pulses.   Pulmonary/Chest: Effort normal. No respiratory distress.   Abdominal: Soft. There is no tenderness.   Musculoskeletal: She exhibits no edema or deformity.   RUE weakness   Neurological: She is disoriented. She exhibits abnormal muscle tone.   Mildly somnolent on exam but does awaken to voice and tactile stimuli,  Follows commands for hand    +aphasia   Skin: Skin is warm and dry.   Psychiatric: Cognition and memory are impaired.   Vitals reviewed.    Assessment/Plan:      * Nontraumatic subcortical hemorrhage of left cerebral hemisphere  -S/p urgent left crani for clot evacuation  -NSGY  "following  -currently receiving cardene gtt    See hospital course for functional, cognitive/speech/language, and nutrition/swallow status.      Recommendations  -  Encourage mobility, OOB in chair at least 3 hours per day, and early ambulation as appropriate   -  PT/OT evaluate and treat  -  SLP speech and cognitive evaluate and treat  -  Monitor sleep disturbances and establish consistent sleep-wake cycle  -  Monitor for bowel and bladder dysfunction  -  Monitor for shoulder pain, subluxation, & spasticity  -  Monitor for and prevent skin breakdown and pressure ulcers  · Early mobility, repositioning/weight shifting every 20-30 minutes when sitting, turn patient every 2 hours, proper mattress/overlay and chair cushioning, pressure relief/heel protector boots  -  DVT prophylaxis (if appropriate)  -  Reviewed discharge options (IP rehab, SNF, HH therapy, and OP therapy)    Brown tumor  - Per  NSGY, "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 Follow up scan with more punctuate ICH and perilesional edema"  -pathology pending   Hypocalcemia  -nephrology managing     Renovascular hypertension  -on cardene gtt     ESRD on hemodialysis  -nephrology following  -CRRT in room 5/1    Liver replaced by transplant  -s/p liver trx 1992 due to hemangioendothelioma      Recommend Inpatient Rehab per Dr. Jones pending CRRT and cardene gtt. [place].         Odessa Murrell NP  Department of Physical Medicine & Rehab   [facility]  "

## 2019-05-01 NOTE — PLAN OF CARE
Problem: Adult Inpatient Plan of Care  Goal: Plan of Care Review  Outcome: Ongoing (interventions implemented as appropriate)  POC reviewed with pt and mother at 1700. Pt and mother both verbalized understanding; however, patient will need reinforcement due to confusion. All questions and concerns from the mother and patient were addressed. Patient was weaned off cardene drip with a new SBP goal of less than 160. Will continue to monitor. See flowsheets for full assessment and VS info.

## 2019-05-01 NOTE — ASSESSMENT & PLAN NOTE
-S/p urgent left crani for clot evacuation  -NSGY following  -currently receiving cardene gtt    See hospital course for functional, cognitive/speech/language, and nutrition/swallow status.      Recommendations  -  Encourage mobility, OOB in chair at least 3 hours per day, and early ambulation as appropriate   -  PT/OT evaluate and treat  -  SLP speech and cognitive evaluate and treat  -  Monitor sleep disturbances and establish consistent sleep-wake cycle  -  Monitor for bowel and bladder dysfunction  -  Monitor for shoulder pain, subluxation, & spasticity  -  Monitor for and prevent skin breakdown and pressure ulcers  · Early mobility, repositioning/weight shifting every 20-30 minutes when sitting, turn patient every 2 hours, proper mattress/overlay and chair cushioning, pressure relief/heel protector boots  -  DVT prophylaxis (if appropriate)  -  Reviewed discharge options (IP rehab, SNF, HH therapy, and OP therapy)

## 2019-05-01 NOTE — PLAN OF CARE
Problem: SLP Goal  Goal: SLP Goal  Goals to be met 5/2    1. Pt will tolerate diet of thin liquids and dental soft solids without overt clinical signs of aspiration   2. Pt will participate in trials of regular solids within speech therapy sessions to help determine least restrictive diet   3. Pt will demonstrate orientation to place, situation , family members, date w/ min cues   4. Pt will complete category inclusions task w/ 80% acc w/ min cues   5. Pt will generate 3 items per category w/ min cues to enhance organizations skills   6. Pt will label items w/ 80%acc w/ mincues to enhance verbal expression skills   7. Pt will participate in ongoing assessment of speech language and cognitive linguistic skills to help rule out deficits and determine therapeutic plan of care      Outcome: Ongoing (interventions implemented as appropriate)  Pt continues to exhibit aphasia and frequent perseveration. Cont POC.   ROM Fritz, CCC-SLP  Speech Language Pathologist  [phone]  5/1/2019

## 2019-05-01 NOTE — PROGRESS NOTES
[facility]  Neurosurgery  Progress Note    Subjective:       Post-Op Info:  Procedure(s) (LRB):  CRANIOTOMY-- left crani for clot evac with microscrope (Left)   8 Days Post-Op     Blood pressure not controlled. Exam improving. CT scan stable. Off Cardene     Medications:  Continuous Infusions:   nicardipine 2.5 mg/hr (04/30/19 1805)     Scheduled Meds:   bisacodyl  10 mg Rectal Daily    calcitriol  1 mcg Oral BID    calcium carbonate  2,000 mg Oral TID    carvedilol  25 mg Oral BID    cloNIDine  0.3 mg Oral Q8H    famotidine  20 mg Oral Daily    hydrALAZINE  100 mg Oral Q8H    irbesartan  300 mg Oral Daily    lacosamide  50 mg Oral BID    levETIRAcetam  500 mg Oral BID    polyethylene glycol  17 g Oral Daily    senna-docusate 8.6-50 mg  2 tablet Oral Daily    sodium chloride  2 g Oral BID    tacrolimus  3 mg Oral Daily    tacrolimus  3 mg Oral Daily     PRN Meds:sodium chloride, sodium chloride, dextrose 50%, dextrose 50%, glucagon (human recombinant), glucose, glucose, hydrALAZINE, insulin aspart U-100, labetalol, ondansetron, oxyCODONE, sodium chloride 0.9%     Review of Systems     Unable to aphasia   Objective:     Weight: 54.8 kg (120 lb 13 oz)  Body mass index is 22.1 kg/m².  Vital Signs (Most Recent):  Temp: 98.2 °F (36.8 °C) (04/30/19 1505)  Pulse: 71 (04/30/19 1805)  Resp: 19 (04/30/19 1805)  BP: 129/66 (04/30/19 1805)  SpO2: 95 % (04/30/19 1805) Vital Signs (24h Range):  Temp:  [97.5 °F (36.4 °C)-98.2 °F (36.8 °C)] 98.2 °F (36.8 °C)  Pulse:  [63-75] 71  Resp:  [15-23] 19  SpO2:  [92 %-100 %] 95 %  BP: (127-191)/() 129/66     Date 04/30/19 0700 - 05/01/19 0659   Shift 8091-1064 1868-7540 4017-7069 24 Hour Total   INTAKE   P.O. 300 200  500   I.V.(mL/kg) 235.8(4.3) 261.2(4.8)  497(9.1)   Blood 915.8   915.8   Shift Total(mL/kg) 1451.6(26.5) 461.2(8.4)  1912.8(34.9)   OUTPUT   Shift Total(mL/kg)       Weight (kg) 54.8 54.8 54.8 54.8              Oxygen Concentration  (%):  [28] 28         Hemodialysis AV Fistula Left forearm (Active)   Needle Size 15ga 4/25/2019  1:20 PM   Site Assessment Clean;Dry;Intact 4/25/2019  1:20 PM   Patency Present;Thrill;Bruit 4/25/2019  1:20 PM   Status Accessed 4/25/2019  1:20 PM   Flows Good 4/25/2019  3:05 AM   Dressing Intervention Dressing reinforced 4/25/2019  3:05 AM   Dressing Status Clean;Intact;Dry 4/25/2019 11:01 AM   Site Condition No complications 4/25/2019 11:01 AM   Dressing Occlusive 4/25/2019 11:01 AM   Drainage Description Other (Comment) 4/14/2018  5:26 PM       Neurosurgery Physical Exam  [id number]  aox1 name  PERRLA   Partial transcortical motor aphasia improving   Move L side and RLE AG, RUE paresis with some effort against gravity   Wound c/d/i        Significant Labs:  Recent Labs   Lab 04/29/19 1959 04/30/19  0011 04/30/19  0512   * 138*  138* 109    136  136 136   K 4.1 4.5  4.5 4.4    102  102 103   CO2 21* 21*  21* 21*   BUN 57* 60*  60* 61*   CREATININE 6.2* 6.4*  6.4* 6.5*   CALCIUM 6.3* 5.8*  5.8* 5.7*   MG  --   --  1.7     Recent Labs   Lab 04/29/19 1959 04/30/19  0011 04/30/19  1035   WBC 4.29 2.80* 2.75*   HGB 9.8* 8.4* 7.7*   HCT 29.4* 25.9* 24.4*   PLT 88* 71* 87*     Recent Labs   Lab 04/29/19  0232 04/29/19  1126 04/30/19  0011   INR  --  1.3* 1.3*   APTT 27.1  --  26.9     Microbiology Results (last 7 days)     ** No results found for the last 168 hours. **        All pertinent labs from the last 24 hours have been reviewed.    Significant Diagnostics:  CT: Ct Head Without Contrast    Result Date: 4/25/2019  Evolving operative change from left temporal craniectomy and cranioplasty for left posterior temporal parenchymal hematoma evacuation. There is reduced postoperative pneumocephalus with small volumes of increased parenchymal hemorrhage within and about the resection cavity. Evolving mass effect and edema with continued 5-6 mm of rightward midline shift similar to prior. There is  continued small volume intraventricular hemorrhage with slight increase distension of the lateral ventricles concerning for component of evolving hydrocephalus. 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 Follow up scan with more punctuate ICH and perilesional edema as well as IV after severe hypertensive episode.       POD#8           Follow up scan stable               Continue neurochecks per protocol, RSW and aphasia improving   HOB>30  Keppra and lacosamide   Na >135  HDS, keep SBP <140  Sating well at RA  ADAT   HD per nephrology   Daily PT/OT and consult PM&R  DVTx: SCD/TCD and okay for SQH   Further management of care per NCC  Stepdown to  pending BP control   Will continue to monitor. Please page NSGY with any changes in exam        Kimberly Roy MD  Neurosurgery  [facility]

## 2019-05-01 NOTE — SUBJECTIVE & OBJECTIVE
Medications:  Continuous Infusions:   nicardipine Stopped (05/01/19 1405)     Scheduled Meds:   bisacodyl  10 mg Rectal Daily    calcitriol  1 mcg Oral BID    calcium carbonate  2,000 mg Oral TID    carvedilol  25 mg Oral BID    cloNIDine  0.3 mg Oral Q8H    famotidine  20 mg Oral Daily    hydrALAZINE  100 mg Oral Q8H    irbesartan  300 mg Oral Daily    lacosamide  50 mg Oral BID    levETIRAcetam  500 mg Oral BID    polyethylene glycol  17 g Oral Daily    senna-docusate 8.6-50 mg  2 tablet Oral Daily    sodium chloride  2 g Oral BID    tacrolimus  3 mg Oral Daily    tacrolimus  3 mg Oral Daily     PRN Meds:dextrose 50%, dextrose 50%, glucagon (human recombinant), glucose, glucose, hydrALAZINE, insulin aspart U-100, labetalol, ondansetron, oxyCODONE, sodium chloride 0.9%     Review of Systems     Unable to aphasia   Objective:     Weight: 54.8 kg (120 lb 13 oz)  Body mass index is 22.1 kg/m².  Vital Signs (Most Recent):  Temp: 98.4 °F (36.9 °C) (05/01/19 1505)  Pulse: 73 (05/01/19 1705)  Resp: (!) 21 (05/01/19 1705)  BP: (!) 149/91 (05/01/19 1705)  SpO2: 95 % (05/01/19 1705) Vital Signs (24h Range):  Temp:  [97.8 °F (36.6 °C)-98.5 °F (36.9 °C)] 98.4 °F (36.9 °C)  Pulse:  [64-75] 73  Resp:  [13-24] 21  SpO2:  [92 %-100 %] 95 %  BP: (113-155)/(56-93) 149/91     Date 05/01/19 0700 - 05/02/19 0659   Shift 4760-0602 0568-5778 1693-8505 24 Hour Total   INTAKE   P.O. 100 200  300   I.V.(mL/kg) 152.9(2.8)   152.9(2.8)   Other 250   250   Shift Total(mL/kg) 502.9(9.2) 200(3.6)  702.9(12.8)   OUTPUT   Other 2000 2000   Shift Total(mL/kg) 2000(36.5)   2000(36.5)   Weight (kg) 54.8 54.8 54.8 54.8              Oxygen Concentration (%):  [28] 28         Hemodialysis AV Fistula Left forearm (Active)   Needle Size 15ga 4/25/2019  1:20 PM   Site Assessment Clean;Dry;Intact 4/25/2019  1:20 PM   Patency Present;Thrill;Bruit 4/25/2019  1:20 PM   Status Accessed 4/25/2019  1:20 PM   Flows Good 4/25/2019  3:05 AM    Dressing Intervention Dressing reinforced 4/25/2019  3:05 AM   Dressing Status Clean;Intact;Dry 4/25/2019 11:01 AM   Site Condition No complications 4/25/2019 11:01 AM   Dressing Occlusive 4/25/2019 11:01 AM   Drainage Description Other (Comment) 4/14/2018  5:26 PM       Neurosurgery Physical Exam  [id number]  aox1 name  SHRAVAN   Partial transcortical motor aphasia improving   Move L side and RLE AG, RUE paresis with some effort against gravity   Wound c/d/i        Significant Labs:  Recent Labs   Lab 04/30/19  0512 04/30/19 2030 05/01/19  0046 05/01/19  0803    157* 107 101    139 140 137   K 4.4 4.7 4.8 5.0    105 106 105   CO2 21* 21* 20* 20*   BUN 61* 65* 66* 67*   CREATININE 6.5* 7.3* 7.6* 7.8*   CALCIUM 5.7* 5.4* 5.4* 5.5*   MG 1.7  --   --  2.0     Recent Labs   Lab 04/30/19  0011 04/30/19  1035 05/01/19  0046   WBC 2.80* 2.75* 4.09   HGB 8.4* 7.7* 8.9*   HCT 25.9* 24.4* 27.0*   PLT 71* 87* 91*     Recent Labs   Lab 04/30/19  0011 05/01/19  0046   INR 1.3* 1.3*   APTT 26.9 27.4     Microbiology Results (last 7 days)     ** No results found for the last 168 hours. **        All pertinent labs from the last 24 hours have been reviewed.    Significant Diagnostics:  CT: Ct Head Without Contrast    Result Date: 4/25/2019  Evolving operative change from left temporal craniectomy and cranioplasty for left posterior temporal parenchymal hematoma evacuation. There is reduced postoperative pneumocephalus with small volumes of increased parenchymal hemorrhage within and about the resection cavity. Evolving mass effect and edema with continued 5-6 mm of rightward midline shift similar to prior. There is continued small volume intraventricular hemorrhage with slight increase distension of the lateral ventricles concerning for component of evolving hydrocephalus. Continued diffuse effacement cerebral sulci at the vertex concerning for sequela of cerebral edema or evolving hydrocephalus Electronically  signed by: Reggie Espinoza DO Date:    04/25/2019 Time:    15:50

## 2019-05-01 NOTE — TREATMENT PLAN
Treatment Plan  05/01/2019  11:49 AM    Chart reviewed and case discussed with attending.     We are following Ms. Patel for IS and recommendations are:  --Daily CMP, CBC, and INR   --Daily Tacrolimus level  --Continue current dose of Tacrolimus  --We will continue to follow alongside primary team (please promptly notify us of discharge in order to arrange for appropriate outpatient follow up).    Elinor Medeiros M.D.  Gastroenterology Fellow, PGY-V  Pager: [phone]  [facility]

## 2019-05-01 NOTE — ASSESSMENT & PLAN NOTE
- Target plt>50K w/o active bleeding  = NSGY prefers >100K, transfused this morning (plt 71K)  - Plt 91 this AM; will hold off on transfusing at this time

## 2019-05-01 NOTE — ASSESSMENT & PLAN NOTE
2/2 non-compliance with activated vit D and calcium supplementation s/p parathyroidectomy  -PTH elevated @ 407  -phos levels daily  -continue calcitriol 1 mcg po BID  -continue Calcium Carbonate 2 g TID   -calcium gluconate 3 mg IVPB x 1 now

## 2019-05-01 NOTE — ASSESSMENT & PLAN NOTE
"- Per NSGY, "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 Follow up scan with more punctuate ICH and perilesional edema"  -pathology pending       "

## 2019-05-01 NOTE — SUBJECTIVE & OBJECTIVE
Blood pressure not controlled. Exam improving. CT scan stable. Off Cardene     Medications:  Continuous Infusions:   nicardipine 2.5 mg/hr (04/30/19 1805)     Scheduled Meds:   bisacodyl  10 mg Rectal Daily    calcitriol  1 mcg Oral BID    calcium carbonate  2,000 mg Oral TID    carvedilol  25 mg Oral BID    cloNIDine  0.3 mg Oral Q8H    famotidine  20 mg Oral Daily    hydrALAZINE  100 mg Oral Q8H    irbesartan  300 mg Oral Daily    lacosamide  50 mg Oral BID    levETIRAcetam  500 mg Oral BID    polyethylene glycol  17 g Oral Daily    senna-docusate 8.6-50 mg  2 tablet Oral Daily    sodium chloride  2 g Oral BID    tacrolimus  3 mg Oral Daily    tacrolimus  3 mg Oral Daily     PRN Meds:sodium chloride, sodium chloride, dextrose 50%, dextrose 50%, glucagon (human recombinant), glucose, glucose, hydrALAZINE, insulin aspart U-100, labetalol, ondansetron, oxyCODONE, sodium chloride 0.9%     Review of Systems     Unable to aphasia   Objective:     Weight: 54.8 kg (120 lb 13 oz)  Body mass index is 22.1 kg/m².  Vital Signs (Most Recent):  Temp: 98.2 °F (36.8 °C) (04/30/19 1505)  Pulse: 71 (04/30/19 1805)  Resp: 19 (04/30/19 1805)  BP: 129/66 (04/30/19 1805)  SpO2: 95 % (04/30/19 1805) Vital Signs (24h Range):  Temp:  [97.5 °F (36.4 °C)-98.2 °F (36.8 °C)] 98.2 °F (36.8 °C)  Pulse:  [63-75] 71  Resp:  [15-23] 19  SpO2:  [92 %-100 %] 95 %  BP: (127-191)/() 129/66     Date 04/30/19 0700 - 05/01/19 0659   Shift 6801-6187 2813-1211 9406-5338 24 Hour Total   INTAKE   P.O. 300 200  500   I.V.(mL/kg) 235.8(4.3) 261.2(4.8)  497(9.1)   Blood 915.8   915.8   Shift Total(mL/kg) 1451.6(26.5) 461.2(8.4)  1912.8(34.9)   OUTPUT   Shift Total(mL/kg)       Weight (kg) 54.8 54.8 54.8 54.8              Oxygen Concentration (%):  [28] 28         Hemodialysis AV Fistula Left forearm (Active)   Needle Size 15ga 4/25/2019  1:20 PM   Site Assessment Clean;Dry;Intact 4/25/2019  1:20 PM   Patency Present;Thrill;Bruit 4/25/2019   1:20 PM   Status Accessed 4/25/2019  1:20 PM   Flows Good 4/25/2019  3:05 AM   Dressing Intervention Dressing reinforced 4/25/2019  3:05 AM   Dressing Status Clean;Intact;Dry 4/25/2019 11:01 AM   Site Condition No complications 4/25/2019 11:01 AM   Dressing Occlusive 4/25/2019 11:01 AM   Drainage Description Other (Comment) 4/14/2018  5:26 PM       Neurosurgery Physical Exam  E4V4M6  aox1 name  PERRLA   Partial transcortical motor aphasia improving   Move L side and RLE AG, RUE paresis with some effort against gravity   Wound c/d/i        Significant Labs:  Recent Labs   Lab 04/29/19 1959 04/30/19  0011 04/30/19  0512   * 138*  138* 109    136  136 136   K 4.1 4.5  4.5 4.4    102  102 103   CO2 21* 21*  21* 21*   BUN 57* 60*  60* 61*   CREATININE 6.2* 6.4*  6.4* 6.5*   CALCIUM 6.3* 5.8*  5.8* 5.7*   MG  --   --  1.7     Recent Labs   Lab 04/29/19 1959 04/30/19  0011 04/30/19  1035   WBC 4.29 2.80* 2.75*   HGB 9.8* 8.4* 7.7*   HCT 29.4* 25.9* 24.4*   PLT 88* 71* 87*     Recent Labs   Lab 04/29/19  0232 04/29/19  1126 04/30/19  0011   INR  --  1.3* 1.3*   APTT 27.1  --  26.9     Microbiology Results (last 7 days)     ** No results found for the last 168 hours. **        All pertinent labs from the last 24 hours have been reviewed.    Significant Diagnostics:  CT: Ct Head Without Contrast    Result Date: 4/25/2019  Evolving operative change from left temporal craniectomy and cranioplasty for left posterior temporal parenchymal hematoma evacuation. There is reduced postoperative pneumocephalus with small volumes of increased parenchymal hemorrhage within and about the resection cavity. Evolving mass effect and edema with continued 5-6 mm of rightward midline shift similar to prior. There is continued small volume intraventricular hemorrhage with slight increase distension of the lateral ventricles concerning for component of evolving hydrocephalus. Continued diffuse effacement cerebral sulci  at the vertex concerning for sequela of cerebral edema or evolving hydrocephalus Electronically signed by: Reggie Espinoza DO Date:    04/25/2019 Time:    15:50

## 2019-05-01 NOTE — PT/OT/SLP PROGRESS
Occupational Therapy   Treatment    Name: Holly Patel  MRN: [id number]  Admitting Diagnosis:  Nontraumatic subcortical hemorrhage of left cerebral hemisphere  9 Days Post-Op    Recommendations:     Discharge Recommendations: rehabilitation facility  Discharge Equipment Recommendations:  none  Barriers to discharge:  Other (Comment)(safety risk; level of skilled assistance required; remains in ICU)    Assessment:     Holly Patel is a 28 y.o. female with a medical diagnosis of Nontraumatic subcortical hemorrhage of left cerebral hemisphere.  She presents with aphasia and overall decline in endurance and functional indep with self care and daily tasks and activities. She demo increased fatigue for activity on this date; pt completing HD this morning. Cont to rec rehab at post acute level of care. Performance deficits affecting function are impaired endurance, impaired self care skills, impaired functional mobilty, gait instability, impaired balance, impaired cognition, impaired cardiopulmonary response to activity, other (comment)(aphasia).     Rehab Prognosis:  Good; patient would benefit from acute skilled OT services to address these deficits and reach maximum level of function.       Plan:     Patient to be seen 4 x/week to address the above listed problems via self-care/home management, therapeutic activities, therapeutic exercises, neuromuscular re-education, cognitive retraining  · Plan of Care Expires: 05/25/19  · Plan of Care Reviewed with: patient    Subjective     Pain/Comfort:  · Pain Rating 1: 0/10(reported no pain grimace with sit<>stand x2/5)  · Pain Rating Post-Intervention 1: 0/10    Objective:     Communicated with: RN prior to session. Mother at bedside throughout. Patient found up in chair with blood pressure cuff, peripheral IV, telemetry, pulse ox (continuous) upon OT entry to room.    General Precautions: Standard, aphasia, aspiration, fall, seizure, dental soft(renal)    Orthopedic Precautions:N/A     Occupational Performance:   Treatment & Education:  -Pt alert and agreeable to therapy session; reported orientation to person and place; requiring cues for time and overall situation  -Mod cues for telling time on analog clock   -Pt declining ADLs on this date 2/2 fatigue   -Completed endurance exercises: x5 sit<>stand from chair with CGA and B arm rest as self support  -static standing CGA  -rest break following 3rd sit<>stand; pt with reported fatigue from task  -Communication board updated; questions/concerns addressed within OT scope of practice      [facility] 6 Click ADL: 18  Body mass index is 22.1 kg/m².  Vitals:    05/01/19 1537   BP: (!) 155/93   Pulse:    Resp:    Temp:        Patient left up in chair with all lines intact, call button in reach, RN notified and mother presentEducation:      GOALS:   Multidisciplinary Problems     Occupational Therapy Goals        Problem: Occupational Therapy Goal    Goal Priority Disciplines Outcome Interventions   Occupational Therapy Goal     OT, PT/OT Ongoing (interventions implemented as appropriate)    Description:  Goals to be met by: 5/10(2 weeks from initial evaluation)     Patient will increase functional independence with ADLs by performing:    Pt will follow 95% of simple step commands for functional tasks.   Pt will verbally ID 1/3 items for ADL task with added time.   UE Dressing with Set-up Assistance and Supervision.  LE Dressing (pants, brief) with Set-up Assistance and Minimal Assistance.  Grooming while standing with Set-up Assistance and Contact Guard Assistance.  Toileting from toilet with Minimal Assistance for hygiene and clothing management. Met  *revised: with set up and supervision  Toilet transfer to toilet with Minimal Assistance. Met  *revised: with CGA  Functional mobility at short household distance for ADL task with Minimal Assistance. Met  *revised: with CGA                       Time Tracking:     OT Date of  Treatment: 05/01/19  OT Start Time: 1520  OT Stop Time: 1535  OT Total Time (min): 15 min    Billable Minutes:Therapeutic Activity 15    RENA Horta  5/1/2019

## 2019-05-01 NOTE — ASSESSMENT & PLAN NOTE
ESRD w/ HD on MWF as outpatient  AVF in [facility]    - Nephrology following, appreciate their assistance  - Low flow HD on 05/01; tolerating well this AM   - monitor BMP q12 hr

## 2019-05-01 NOTE — PROGRESS NOTES
[facility]  Neurosurgery  Progress Note    Subjective:     History of Present Illness: No notes on file    Post-Op Info:  Procedure(s) (LRB):  CRANIOTOMY-- left crani for clot evac with microscrope (Left)   9 Days Post-Op         Medications:  Continuous Infusions:   nicardipine Stopped (05/01/19 1405)     Scheduled Meds:   bisacodyl  10 mg Rectal Daily    calcitriol  1 mcg Oral BID    calcium carbonate  2,000 mg Oral TID    carvedilol  25 mg Oral BID    cloNIDine  0.3 mg Oral Q8H    famotidine  20 mg Oral Daily    hydrALAZINE  100 mg Oral Q8H    irbesartan  300 mg Oral Daily    lacosamide  50 mg Oral BID    levETIRAcetam  500 mg Oral BID    polyethylene glycol  17 g Oral Daily    senna-docusate 8.6-50 mg  2 tablet Oral Daily    sodium chloride  2 g Oral BID    tacrolimus  3 mg Oral Daily    tacrolimus  3 mg Oral Daily     PRN Meds:dextrose 50%, dextrose 50%, glucagon (human recombinant), glucose, glucose, hydrALAZINE, insulin aspart U-100, labetalol, ondansetron, oxyCODONE, sodium chloride 0.9%     Review of Systems     Unable to aphasia   Objective:     Weight: 54.8 kg (120 lb 13 oz)  Body mass index is 22.1 kg/m².  Vital Signs (Most Recent):  Temp: 98.4 °F (36.9 °C) (05/01/19 1505)  Pulse: 73 (05/01/19 1705)  Resp: (!) 21 (05/01/19 1705)  BP: (!) 149/91 (05/01/19 1705)  SpO2: 95 % (05/01/19 1705) Vital Signs (24h Range):  Temp:  [97.8 °F (36.6 °C)-98.5 °F (36.9 °C)] 98.4 °F (36.9 °C)  Pulse:  [64-75] 73  Resp:  [13-24] 21  SpO2:  [92 %-100 %] 95 %  BP: (113-155)/(56-93) 149/91     Date 05/01/19 0700 - 05/02/19 0659   Shift 5541-8271 7528-9918 8240-4392 24 Hour Total   INTAKE   P.O. 100 200  300   I.V.(mL/kg) 152.9(2.8)   152.9(2.8)   Other 250   250   Shift Total(mL/kg) 502.9(9.2) 200(3.6)  702.9(12.8)   OUTPUT   Other 2000 2000   Shift Total(mL/kg) 2000(36.5)   2000(36.5)   Weight (kg) 54.8 54.8 54.8 54.8              Oxygen Concentration (%):  [28] 28         Hemodialysis  AV Fistula Left forearm (Active)   Needle Size 15ga 4/25/2019  1:20 PM   Site Assessment Clean;Dry;Intact 4/25/2019  1:20 PM   Patency Present;Thrill;Bruit 4/25/2019  1:20 PM   Status Accessed 4/25/2019  1:20 PM   Flows Good 4/25/2019  3:05 AM   Dressing Intervention Dressing reinforced 4/25/2019  3:05 AM   Dressing Status Clean;Intact;Dry 4/25/2019 11:01 AM   Site Condition No complications 4/25/2019 11:01 AM   Dressing Occlusive 4/25/2019 11:01 AM   Drainage Description Other (Comment) 4/14/2018  5:26 PM       Neurosurgery Physical Exam  [id number]  aox1 name  PERRLA   Partial transcortical motor aphasia improving   Move L side and RLE AG, RUE paresis with some effort against gravity   Wound c/d/i        Significant Labs:  Recent Labs   Lab 04/30/19  0512 04/30/19  2030 05/01/19  0046 05/01/19  0803    157* 107 101    139 140 137   K 4.4 4.7 4.8 5.0    105 106 105   CO2 21* 21* 20* 20*   BUN 61* 65* 66* 67*   CREATININE 6.5* 7.3* 7.6* 7.8*   CALCIUM 5.7* 5.4* 5.4* 5.5*   MG 1.7  --   --  2.0     Recent Labs   Lab 04/30/19  0011 04/30/19  1035 05/01/19  0046   WBC 2.80* 2.75* 4.09   HGB 8.4* 7.7* 8.9*   HCT 25.9* 24.4* 27.0*   PLT 71* 87* 91*     Recent Labs   Lab 04/30/19  0011 05/01/19  0046   INR 1.3* 1.3*   APTT 26.9 27.4     Microbiology Results (last 7 days)     ** No results found for the last 168 hours. **        All pertinent labs from the last 24 hours have been reviewed.    Significant Diagnostics:  CT: Ct Head Without Contrast    Result Date: 4/25/2019  Evolving operative change from left temporal craniectomy and cranioplasty for left posterior temporal parenchymal hematoma evacuation. There is reduced postoperative pneumocephalus with small volumes of increased parenchymal hemorrhage within and about the resection cavity. Evolving mass effect and edema with continued 5-6 mm of rightward midline shift similar to prior. There is continued small volume intraventricular hemorrhage with  slight increase distension of the lateral ventricles concerning for component of evolving hydrocephalus. 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 Follow up scan with more punctuate ICH and perilesional edema as well as IV after severe hypertensive episode.      No acute events overnight. Pt with improving aphasia.     --Continue care per primary team.  --Continue levetiracetam 500 bid.  --Continue lacosamide 50 bid.  --Continue hourly neuromonitoring.  --Continue strict blood pressure control.  --Pt cleared from neurosurgical perspective for stepdown to floor under hospital medicine service when deemed appropriate by primary team.   --We will continue to monitor closely, please contact us with any questions or concerns.            Tian Whitlock MD  Neurosurgery  [facility]

## 2019-05-01 NOTE — ASSESSMENT & PLAN NOTE
ESRD on iHD TTS  [facility]  Dr. Bass  3.5 hours  EDW ~ 50 kg  LFA AVF    Plan/Recommendations:  -Will plan for dialysis today for metabolic clearance and volume management   -Target UF 1-1.5 L as tolerated, keep MAP >65  -Will likely dialyze using low flow HD again   -continue strict I/O's  -daily renal panel with phos added

## 2019-05-01 NOTE — SUBJECTIVE & OBJECTIVE
Interval History: Last HD on Monday w/o issues. Patient restless but oriented when awaken on exam. Still with hypocalcemia, CoCa 6.8. iCA pending.     Review of patient's allergies indicates:   Allergen Reactions    Chloral hydrate Hallucinations     Other reaction(s): Hallucinations  Other reaction(s): Hives    Hydrocodone Other (See Comments)     Mental status changes    Tolerates oxycodone     Current Facility-Administered Medications   Medication Frequency    0.9%  NaCl infusion (for blood administration) Q24H PRN    0.9%  NaCl infusion (for blood administration) Q24H PRN    bisacodyl suppository 10 mg Daily    calcitriol solution 1 mcg BID    calcium carbonate 500 mg/5 mL (1,250 mg/5 mL) suspension 2,000 mg TID    carvedilol tablet 25 mg BID    cloNIDine tablet 0.3 mg Q8H    dextrose 50% injection 12.5 g PRN    dextrose 50% injection 25 g PRN    famotidine tablet 20 mg Daily    glucagon (human recombinant) injection 1 mg PRN    glucose chewable tablet 16 g PRN    glucose chewable tablet 24 g PRN    hydrALAZINE injection 10 mg Q4H PRN    hydrALAZINE tablet 100 mg Q8H    insulin aspart U-100 pen 1-10 Units QID (AC + HS) PRN    irbesartan tablet 300 mg Daily    labetalol 20 mg/4 mL (5 mg/mL) IV syring Q4H PRN    lacosamide tablet 50 mg BID    levETIRAcetam tablet 500 mg BID    niCARdipine 40 mg/200 mL infusion Continuous    ondansetron disintegrating tablet 4 mg Q6H PRN    oxyCODONE immediate release tablet 5 mg Q6H PRN    polyethylene glycol packet 17 g Daily    senna-docusate 8.6-50 mg per tablet 2 tablet Daily    sodium chloride 0.9% flush 10 mL PRN    sodium chloride tablet 2 g BID    tacrolimus (PROGRAF) 1 mg/mL oral syringe Daily    tacrolimus (PROGRAF) 1 mg/mL oral syringe Daily       Objective:     Vital Signs (Most Recent):  Temp: 97.8 °F (36.6 °C) (05/01/19 0705)  Pulse: 70 (05/01/19 0820)  Resp: (!) 21 (05/01/19 0820)  BP: 127/72 (05/01/19 0820)  SpO2: (!) 92 % (05/01/19  0820)  O2 Device (Oxygen Therapy): nasal cannula (05/01/19 0820) Vital Signs (24h Range):  Temp:  [97.8 °F (36.6 °C)-98.5 °F (36.9 °C)] 97.8 °F (36.6 °C)  Pulse:  [65-75] 70  Resp:  [17-24] 21  SpO2:  [92 %-100 %] 92 %  BP: (114-156)/(57-94) 127/72     Weight: 54.8 kg (120 lb 13 oz) (04/22/19 1100)  Body mass index is 22.1 kg/m².  Body surface area is 1.55 meters squared.    I/O last 3 completed shifts:  In: 2931.6 [P.O.:500; I.V.:1268.9; Blood:1162.8]  Out: -     Physical Exam   Constitutional: She is oriented to person, place, and time. She appears well-developed. She is sleeping. She is easily aroused. No distress.   HENT:   Right Ear: External ear normal.   Left Ear: External ear normal.   Eyes: Conjunctivae and EOM are normal. Right eye exhibits no discharge. Left eye exhibits no discharge.   Neck: Normal range of motion. Neck supple.   Cardiovascular: Normal rate and regular rhythm. Exam reveals no gallop and no friction rub.   No murmur heard.  Pulmonary/Chest: Effort normal and breath sounds normal. No respiratory distress. She has no wheezes. She has no rales.   Abdominal: Soft. Bowel sounds are normal. She exhibits no distension. There is no tenderness.   Musculoskeletal: Normal range of motion. She exhibits no edema or deformity.   Neurological: She is oriented to person, place, and time and easily aroused.   Skin: Skin is warm and dry. She is not diaphoretic.   Psychiatric: She has a normal mood and affect. Her behavior is normal.     Significant Labs:  CBC:   Recent Labs   Lab 05/01/19 0046   WBC 4.09   RBC 3.28*   HGB 8.9*   HCT 27.0*   PLT 91*   MCV 82   MCH 27.1   MCHC 33.0     CMP:   Recent Labs   Lab 05/01/19 0046      CALCIUM 5.4*   ALBUMIN 2.2*   PROT 5.6*      K 4.8   CO2 20*      BUN 66*   CREATININE 7.6*   ALKPHOS 363*   ALT 13   AST 26   BILITOT 0.4

## 2019-05-01 NOTE — PLAN OF CARE
Problem: Adult Inpatient Plan of Care  Goal: Plan of Care Review  Outcome: Revised  POC reviewed with pt at 0500. Pt cannot verbalize understanding. Pt neurologically unchanged. HR 60-70s. On 2L NC sats >95. No BM, No UO. Cardene on and off. Skin intact. No acute events overnight. Pt progressing toward goals. Will continue to monitor. See flowsheets for full assessment and VS info

## 2019-05-01 NOTE — PROGRESS NOTES
HD initiated, cannulated lower left arm AVF with 15 gauge needles .Good blood flow noted  For HD treatment. . Patient alert and  Responsive . Vital stable. UF goal set for 1.5 net as tolerated. . Place on 2k, 3 calcium  [place]. This HD is of course a 1 to 1 bedside  treatment,.

## 2019-05-01 NOTE — PROGRESS NOTES
HD completed and tolerated well. Net uf   1500ml.  blood returned and needles  Pulled.  Post bleeding time < 5 minutes. Vital stable and no distress or discomfort noted at this time.  Patient alert but confused.to time and place this Hd was a 1 on 1 bedside  Treatment.

## 2019-05-01 NOTE — SUBJECTIVE & OBJECTIVE
Interval History 5/1/2019:  Patient is seen for follow-up rehab evaluation and recommendations:   CRRT ongoing upon entering room. Patient is more alert today, but somnolence still present. Participating with therapy.     HPI, Past Medical, Family, and Social History remains the same as documented in the initial encounter.    Scheduled Medications:    sodium chloride 0.9%   Intravenous Once    bisacodyl  10 mg Rectal Daily    calcitriol  1 mcg Oral BID    calcium carbonate  2,000 mg Oral TID    carvedilol  25 mg Oral BID    cloNIDine  0.3 mg Oral Q8H    famotidine  20 mg Oral Daily    hydrALAZINE  100 mg Oral Q8H    irbesartan  300 mg Oral Daily    lacosamide  50 mg Oral BID    levETIRAcetam  500 mg Oral BID    polyethylene glycol  17 g Oral Daily    senna-docusate 8.6-50 mg  2 tablet Oral Daily    sodium chloride  2 g Oral BID    tacrolimus  3 mg Oral Daily    tacrolimus  3 mg Oral Daily       Diagnostic Results: Labs: Reviewed    PRN Medications: sodium chloride, sodium chloride, dextrose 50%, dextrose 50%, glucagon (human recombinant), glucose, glucose, hydrALAZINE, insulin aspart U-100, labetalol, ondansetron, oxyCODONE, sodium chloride 0.9%    Review of Systems   Constitutional: Positive for activity change.   HENT: Positive for trouble swallowing.    Respiratory: Negative for cough and shortness of breath.    Cardiovascular: Negative for chest pain and palpitations.   Musculoskeletal: Positive for gait problem.   Allergic/Immunologic: Positive for immunocompromised state.   Neurological: Positive for speech difficulty and weakness.   Psychiatric/Behavioral: Positive for confusion. Negative for agitation.     Objective:     Vital Signs (Most Recent):  Temp: 97.8 °F (36.6 °C) (05/01/19 0705)  Pulse: 64 (05/01/19 1045)  Resp: 19 (05/01/19 1045)  BP: 128/65 (05/01/19 1045)  SpO2: 96 % (05/01/19 1045)    Vital Signs (24h Range):  Temp:  [97.8 °F (36.6 °C)-98.5 °F (36.9 °C)] 97.8 °F (36.6 °C)  Pulse:   [64-75] 64  Resp:  [17-24] 19  SpO2:  [92 %-100 %] 96 %  BP: (113-156)/(56-94) 128/65     Physical Exam   Constitutional: She appears well-developed and well-nourished.   Receiving CRRT    HENT:   Head: Normocephalic and atraumatic.   Eyes: Right eye exhibits no discharge. Left eye exhibits no discharge.   Neck: Neck supple.   Cardiovascular: Normal rate and intact distal pulses.   Pulmonary/Chest: Effort normal. No respiratory distress.   Abdominal: Soft. There is no tenderness.   Musculoskeletal: She exhibits no edema or deformity.   RUE weakness      Neurological: She is disoriented. She exhibits abnormal muscle tone.   Mildly somnolent on exam but does awaken to voice and tactile stimuli,  Follows commands for hand    +aphasia   Skin: Skin is warm and dry.   Psychiatric: Cognition and memory are impaired.   Vitals reviewed.

## 2019-05-01 NOTE — ASSESSMENT & PLAN NOTE
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

## 2019-05-01 NOTE — ASSESSMENT & PLAN NOTE
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 Follow up scan with more punctuate ICH and perilesional edema as well as IV after severe hypertensive episode.       POD#8           Follow up scan stable               Continue neurochecks per protocol, RSW and aphasia improving   HOB>30  Keppra and lacosamide   Na >135  HDS, keep SBP <140  Sating well at RA  ADAT   HD per nephrology   Daily PT/OT and consult PM&R  DVTx: SCD/TCD and okay for SQH   Further management of care per NCC  Stepdown to  pending BP control   Will continue to monitor. Please page NSGY with any changes in exam

## 2019-05-01 NOTE — SUBJECTIVE & OBJECTIVE
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 ()    Moderate protein-calorie malnutrition 2017    MRSA bacteremia 2017    Pneumonia     Prophylactic immunotherapy 2014    Renovascular hypertension 10/2/2015    Secondary hyperparathyroidism 2017    Seizures     Sialadenitis 3/21/2018    Thrombocytopenia 2016     Past Surgical History:   Procedure Laterality Date    BIOPSY, LIVER, TRANSJUGULAR APPROACH N/A 2018    [facility]    BIOPSY-LIVER N/A 2017    Performed by [facility]    BIOPSY-LIVER N/A 3/22/2016    Performed by [facility] Diagnostic Provider at [facility] OR [address] [place] [address]     SECTION      x 2    CONIZATION-CERVICAL-LEEP N/A 6/15/2018    Performed by Neelam Marroquin MD at [facility] OR    [id number]; upper extremity Left 2015    [facility]    CRANIOPLASTY  2019    [facility] by [facility]    CRANIOTOMY-- left crani for clot evac with microscrope Left 2019    [facility]    EMBOLIZATION, BLOOD VESSEL N/A 2018    Performed by Aren Ramos MD at [facility] CATH LAB    Exam Under Anesthesia N/A 2018    [facility]    Exam under  anesthesia N/A 6/19/2018    Performed by [facility] at [facility] OR    Exam under anesthesia (ADD ON ) N/A 7/9/2018    Performed by NICKIE Alvarez MD at [facility] OR    Exam under anesthesia -cervical suturing  N/A 7/26/2018    Performed by Lei Sims III, MD at [facility] OR    EXCISION, NEOPLASM, SKULL with Cranioplasty Left 4/22/2019    Performed by [facility] at [facility] OR [facility]    FISTULOGRAM Left 12/4/2015    Performed by Idalia Diaz MD at [facility] CATH LAB    LIVER BIOPSY      LIVER TRANSPLANT  09/1992    PARATHYROIDECTOMY, Minimally Invasive Bilateral Exploration Bilateral 3/27/2019    Performed by Ashley Guallpa MD at [facility] OR [facility]    SUTURE REPAIR,CERVIX  6/19/2018    Performed by [facility]quin [facility] at [facility] OR    TUBAL LIGATION  2010     Review of patient's allergies indicates:   Allergen Reactions    Chloral hydrate Hallucinations     Other reaction(s): Hallucinations  Other reaction(s): Hives    Hydrocodone Other (See Comments)     Mental status changes    Tolerates oxycodone       Scheduled Medications:    bisacodyl  10 mg Rectal Daily    calcitriol  1 mcg Oral BID    calcium carbonate  2,000 mg Oral TID    carvedilol  25 mg Oral BID    cloNIDine  0.3 mg Oral Q8H    famotidine  20 mg Oral Daily    hydrALAZINE  100 mg Oral Q8H    irbesartan  300 mg Oral Daily    lacosamide  50 mg Oral BID    levETIRAcetam  500 mg Oral BID    polyethylene glycol  17 g Oral Daily    senna-docusate 8.6-50 mg  2 tablet Oral Daily    sodium chloride  2 g Oral BID    tacrolimus  3 mg Oral Daily    tacrolimus  3 mg Oral Daily       PRN Medications: sodium chloride, sodium chloride, dextrose 50%, dextrose 50%, glucagon (human recombinant), glucose, glucose, hydrALAZINE, insulin aspart U-100, labetalol, ondansetron, oxyCODONE, sodium chloride 0.9%    Family History     Problem Relation (Age of Onset)    Cancer Sister    Heart attack Maternal Uncle    Hypertension Mother,  Father        Tobacco Use    Smoking status: Never Smoker    Smokeless tobacco: Never Used   Substance and Sexual Activity    Alcohol use: No    Drug use: No    Sexual activity: Never     Partners: Male     Review of Systems   Reason unable to perform ROS: somnolence/cognitive status.     Objective:     Vital Signs (Most Recent):  Temp: 98.5 °F (36.9 °C) (05/01/19 0302)  Pulse: 72 (05/01/19 0602)  Resp: (!) 24 (05/01/19 0602)  BP: 131/64 (05/01/19 0602)  SpO2: (!) 92 % (05/01/19 0602)    Vital Signs (24h Range):  Temp:  [97.9 °F (36.6 °C)-98.5 °F (36.9 °C)] 98.5 °F (36.9 °C)  Pulse:  [65-75] 72  Resp:  [17-24] 24  SpO2:  [92 %-100 %] 92 %  BP: (114-159)/(57-97) 131/64     Body mass index is 22.1 kg/m².    Physical Exam   Constitutional: She appears well-developed and well-nourished. She is sleeping.   HENT:   Head: Normocephalic and atraumatic.   Eyes: Right eye exhibits no discharge. Left eye exhibits no discharge.   Neck: Neck supple.   Cardiovascular: Normal rate and intact distal pulses.   Pulmonary/Chest: Effort normal. No respiratory distress.   Abdominal: Soft. There is no tenderness.   Musculoskeletal: She exhibits no edema or deformity.   RUE weakness      Neurological: She is disoriented. She exhibits abnormal muscle tone.   Very somnolent on exam, but does awaken briefly to verbal and tactile stimuli  Follows commands for hand    +aphasia   Skin: Skin is warm and dry.   Psychiatric: Cognition and memory are impaired.   Vitals reviewed.         Diagnostic Results: Labs: Reviewed  ECG: Reviewed  X-Ray: Reviewed  CT: Reviewed

## 2019-05-01 NOTE — ASSESSMENT & PLAN NOTE
Ca 5.5, iCa 0.67  - Continue Calcitriol to 1 mcg q12 hr  - Continue Calcium carbonate 2000 mg q8 hr  - Nephrology following

## 2019-05-01 NOTE — ASSESSMENT & PLAN NOTE
On Verapamil, Irbesartan, Hydralazine, Coreg, Clonidine at home    - target sBP <160  - On/off cardene gtt; attempting to wean   - Continued on hydralazine 100 mg q8, carvedilol 25 mg q12  - Added clonidine 0.1 mg q8 (4/28), increased to 0.2 mg q8 hr (4/29), will increase to 0.3 mg q8 hr  - Continue irbesartan 300 mg qd

## 2019-05-01 NOTE — CONSULTS
[facility]  Physical Medicine & Rehab  Consult Note    Patient Name: Holly Patel  MRN: [id number]  Admission Date: 4/22/2019  Hospital Length of Stay: 9 days  Attending Physician: Karson Bermeo MD     Inpatient consult to Physical Medicine & Rehabilitation  Consult performed by: Odessa Murrell NP  Consult requested by:  Karson Bermeo MD    Reason for Consult:  assess rehabilitation needs  Consults  Subjective:     Principal Problem: Nontraumatic subcortical hemorrhage of left cerebral hemisphere    HPI: Holly Patel is a 28-year-old female with PMHx of s/p liver transplant (1991), 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  Patient presented to [facility] on 4/22 with Planned surgical excision of L calvarial lesion s/p excision and cranioplasty 4/22/19.  Hospital course complicated by acute worsening of neuro exam ( STAT CT revealed enlarging hematoma w/ midline shift. Urgently taken down to OR for evacuation on 4/22), renovascular hypertension, low Ca, & thrombocytopenia. Currently receiving a 2% Na gtt.     Functional History: Per chart review, patient lives in [place] with mother in a single story home with no steps to enter.  Prior to admission, (I) with ADLs and mobility prior to 08/2018. DME: none.     Hospital Course: 04/26/2019: Participated with OT.  Bed mobility CGA-Maddy.  Sit to stand and transfers Maddy.  Ambulated  4 steps Maddy with mild dizziness.  UBD/grooming Maddy and Feeding CGA.  04/29/2019: Participated with PT.  Bed mobility Maddy.  Sit to stand and transfers Maddy.  Ambulated 5 steps Maddy. Passed bedside swallow evaluation.  SLP recommending dental soft diet and thin liquids. SLP diagnosis of Aphasia and Cognitive-Linguistic Impairment.     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 ()    Moderate protein-calorie malnutrition 2017    MRSA bacteremia 2017    Pneumonia     Prophylactic immunotherapy 2014    Renovascular hypertension 10/2/2015    Secondary hyperparathyroidism 2017    Seizures     Sialadenitis 3/21/2018    Thrombocytopenia 2016     Past Surgical History:   Procedure Laterality Date    BIOPSY, LIVER, TRANSJUGULAR APPROACH N/A 2018    [facility]    BIOPSY-LIVER N/A 2017    Performed by [facility]    BIOPSY-LIVER N/A 3/22/2016    Performed by [facility] Diagnostic Provider at [facility] OR [address] [place] [address]     SECTION      x 2    CONIZATION-CERVICAL-LEEP N/A 6/15/2018    Performed by Ne[facility]am Marroquin MD at [facility] OR    [id number]; upper extremity Left 2015    [facility]    CRANIOPLASTY  2019    [facility]    CRANIOTOMY-- left crani for clot evac with microscrope Left 2019    [facility]    EMBOLIZATION, BLOOD VESSEL N/A 2018    Performed by Aern Ramos MD at [facility] CATH LAB    Exam Under Anesthesia N/A 2018    [facility]    Exam under anesthesia N/A 2018    Performed by [facility]am Marroquin MD at [facility] OR    Exam under anesthesia (ADD ON ) N/A 2018     Performed by NICKIE Alvarez MD at [facility] OR    Exam under anesthesia -cervical suturing  N/A 7/26/2018    Performed by Lei Sims III, MD at [facility] OR    EXCISION, NEOPLASM, SKULL with Cranioplasty Left 4/22/2019    Performed by [facility] Powell MD at [facility] OR [facility]    FISTULOGRAM Left 12/4/2015    Performed by Idalia Daiz MD at [facility] CATH LAB    LIVER BIOPSY      LIVER TRANSPLANT  09/1992    PARATHYROIDECTOMY, Minimally Invasive Bilateral Exploration Bilateral 3/27/2019    Performed by Ashley Guallpa MD at [facility] OR [facility]    SUTURE REPAIR,CERVIX  6/19/2018    Performed by [facility]am [facility]roquin MD at [facility] OR    TUBAL LIGATION  2010     Review of patient's allergies indicates:   Allergen Reactions    Chloral hydrate Hallucinations     Other reaction(s): Hallucinations  Other reaction(s): Hives    Hydrocodone Other (See Comments)     Mental status changes    Tolerates oxycodone       Scheduled Medications:    bisacodyl  10 mg Rectal Daily    calcitriol  1 mcg Oral BID    calcium carbonate  2,000 mg Oral TID    carvedilol  25 mg Oral BID    cloNIDine  0.3 mg Oral Q8H    famotidine  20 mg Oral Daily    hydrALAZINE  100 mg Oral Q8H    irbesartan  300 mg Oral Daily    lacosamide  50 mg Oral BID    levETIRAcetam  500 mg Oral BID    polyethylene glycol  17 g Oral Daily    senna-docusate 8.6-50 mg  2 tablet Oral Daily    sodium chloride  2 g Oral BID    tacrolimus  3 mg Oral Daily    tacrolimus  3 mg Oral Daily       PRN Medications: sodium chloride, sodium chloride, dextrose 50%, dextrose 50%, glucagon (human recombinant), glucose, glucose, hydrALAZINE, insulin aspart U-100, labetalol, ondansetron, oxyCODONE, sodium chloride 0.9%    Family History     Problem Relation (Age of Onset)    Cancer Sister    Heart attack Maternal Uncle    Hypertension Mother, Father        Tobacco Use    Smoking status: Never Smoker    Smokeless tobacco: Never Used   Substance and Sexual Activity     Alcohol use: No    Drug use: No    Sexual activity: Never     Partners: Male     Review of Systems   Reason unable to perform ROS: somnolence/cognitive status.     Objective:     Vital Signs (Most Recent):  Temp: 98.5 °F (36.9 °C) (05/01/19 0302)  Pulse: 72 (05/01/19 0602)  Resp: (!) 24 (05/01/19 0602)  BP: 131/64 (05/01/19 0602)  SpO2: (!) 92 % (05/01/19 0602)    Vital Signs (24h Range):  Temp:  [97.9 °F (36.6 °C)-98.5 °F (36.9 °C)] 98.5 °F (36.9 °C)  Pulse:  [65-75] 72  Resp:  [17-24] 24  SpO2:  [92 %-100 %] 92 %  BP: (114-159)/(57-97) 131/64     Body mass index is 22.1 kg/m².    Physical Exam   Constitutional: She appears well-developed and well-nourished. She is sleeping.   HENT:   Head: Normocephalic and atraumatic.   Eyes: Right eye exhibits no discharge. Left eye exhibits no discharge.   Neck: Neck supple.   Cardiovascular: Normal rate and intact distal pulses.   Pulmonary/Chest: Effort normal. No respiratory distress.   Abdominal: Soft. There is no tenderness.   Musculoskeletal: She exhibits no edema or deformity.   RUE weakness   Neurological: She is disoriented.   Very somnolent on exam, but does awaken briefly to verbal and tactile stimuli  Follows commands for hand    +aphasia   Skin: Skin is warm and dry.   Psychiatric: Cognition and memory are impaired.   Vitals reviewed.         Diagnostic Results: Labs: Reviewed  ECG: Reviewed  X-Ray: Reviewed  CT: Reviewed    Assessment/Plan:     * Nontraumatic subcortical hemorrhage of left cerebral hemisphere  -S/p urgent left crani for clot evacuation  -NSGY following  -currently receiving 2% NA gtt     See hospital course for functional, cognitive/speech/language, and nutrition/swallow status.      Recommendations  -  Encourage mobility, OOB in chair at least 3 hours per day, and early ambulation as appropriate   -  PT/OT evaluate and treat  -  SLP speech and cognitive evaluate and treat  -  Monitor sleep disturbances and establish consistent sleep-wake  "cycle  -  Monitor for bowel and bladder dysfunction  -  Monitor for shoulder pain, subluxation, & spasticity  -  Monitor for and prevent skin breakdown and pressure ulcers  · Early mobility, repositioning/weight shifting every 20-30 minutes when sitting, turn patient every 2 hours, proper mattress/overlay and chair cushioning, pressure relief/heel protector boots  -  DVT prophylaxis (if appropriate)  -  Reviewed discharge options (IP rehab, SNF, HH therapy, and OP therapy)    Brown tumor  - Per SNGY, "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 Follow up scan with more punctuate ICH and perilesional edema"  -pathology pending     ESRD on hemodialysis  -nephrology following    Liver replaced by transplant  -s/p liver trx 1992 due to hemangioendothelioma        Participating with therapy. Very somnolent today. Will follow progress and discuss with rehab team for post acute care/rehab recommendation.      Thank you for your consult.     Odessa Murrell NP  Department of Physical Medicine & Rehab  [facility]    "

## 2019-05-01 NOTE — PLAN OF CARE
Problem: Occupational Therapy Goal  Goal: Occupational Therapy Goal  Goals to be met by: 5/10(2 weeks from initial evaluation)     Patient will increase functional independence with ADLs by performing:    Pt will follow 95% of simple step commands for functional tasks.   Pt will verbally ID 1/3 items for ADL task with added time.   UE Dressing with Set-up Assistance and Supervision.  LE Dressing (pants, brief) with Set-up Assistance and Minimal Assistance.  Grooming while standing with Set-up Assistance and Contact Guard Assistance.  Toileting from toilet with Minimal Assistance for hygiene and clothing management. Met  *revised: with set up and supervision  Toilet transfer to toilet with Minimal Assistance. Met  *revised: with CGA  Functional mobility at short household distance for ADL task with Minimal Assistance. Met  *revised: with CGA      Outcome: Ongoing (interventions implemented as appropriate)  Goals remain appropriate. RENA Horta 5/1/2019

## 2019-05-01 NOTE — PLAN OF CARE
SW received message from Piper with [facility] reporting she is still following the Pt in Epic. They will submit once the Pt is medical stable.    Shanel Delgadillo LMSW  Neurocritical Care   [facility]  [id number]

## 2019-05-01 NOTE — PT/OT/SLP PROGRESS
"Speech Language Pathology Treatment    Patient Name:  Holly Patel   MRN:  [id number]  Admitting Diagnosis: Nontraumatic subcortical hemorrhage of left cerebral hemisphere    Recommendations:                 General Recommendations:  Speech/language therapy  Diet recommendations:  Dental Soft, Liquid Diet Level: Thin   Aspiration Precautions: 1 bite/sip at a time, Alternating bites/sips, Assistance with meals, Avoid talking while eating, Eliminate distractions, Feed only when awake/alert, Frequent oral care, HOB to 90 degrees, Monitor for s/s of aspiration, Small bites/sips and Standard aspiration precautions   General Precautions: Standard, aphasia, aspiration, fall, seizure, dental soft  Communication strategies:  go to room if call light pushed; pt with aphasia    Subjective     "Here" "date" "with a scary face." pt's responses became irrelevant/inappropriate during orientation to place questioning.     Pain/Comfort:  · Pain Rating 1: 0/10    Objective:     Has the patient been evaluated by SLP for swallowing?   Yes  Keep patient NPO? No   Current Respiratory Status: room air      Pt seen sitting up in bed awake/alert with mother present this afternoon.  Pt with appropriate response to SLP's greeting, but responses to orientation q's for place were off-topic.  Pt was not able to orient to place/[facility], but did orient to place/hospital given binary choices.  Pt was oriented to person, but not to month or year despite max cues.  Education provided to pt regarding use of eternal aids, such as white board, to assist with orientation to time. Pt exhibited ability to read part of date written on white board. She was encouraged to utilize white board for orientation and general information. Pt verbalized agreement, but will need reinforcement. Pt answered single word responsive naming q's recalling nouns with 20% accuracy ind'ly/60% given A.  Pt used right/dominant hand during writing task. Pt was instructed to " "write her name. She wrote "Ava."  When instructed to draw a [place], she jo a smiley face. When instructed to draw a square, she jo another smiley face but with a "J" in place of a mouth.  When pt was encouraged to write or draw whatever she chose, she wrote "jusk", "Niaya," and "Rowdy."  Her mother stated those last 2 names were the names of the pt's children.      Assessment:     Holly Patel is a 28 y.o. female with an SLP diagnosis of Aphasia and Cognitive-Linguistic Impairment.     Goals:   Multidisciplinary Problems     SLP Goals        Problem: SLP Goal    Goal Priority Disciplines Outcome   SLP Goal     SLP Ongoing (interventions implemented as appropriate)   Description:  Goals to be met 5/2    1. Pt will tolerate diet of thin liquids and dental soft solids without overt clinical signs of aspiration   2. Pt will participate in trials of regular solids within speech therapy sessions to help determine least restrictive diet   3. Pt will demonstrate orientation to place, situation , family members, date w/ min cues   4. Pt will complete category inclusions task w/ 80% acc w/ min cues   5. Pt will generate 3 items per category w/ min cues to enhance organizations skills   6. Pt will label items w/ 80%acc w/ mincues to enhance verbal expression skills   7. Pt will participate in ongoing assessment of speech language and cognitive linguistic skills to help rule out deficits and determine therapeutic plan of care                       Plan:     · Patient to be seen:  4 x/week   · Plan of Care expires:     · Plan of Care reviewed with:  patient, mother   · SLP Follow-Up:  Yes       Discharge recommendations:  rehabilitation facility     Time Tracking:     SLP Treatment Date:   05/01/19  Speech Start Time:  1440  Speech Stop Time:  1459     Speech Total Time (min):  19 min    Billable Minutes: Speech Therapy Individual 11 and [facility] Care/Home Management Training 8    ROM Fritz, " CCC-SLP  05/01/2019     ROM Fritz, CCC-SLP  Speech Language Pathologist  [phone]  5/1/2019

## 2019-05-01 NOTE — ASSESSMENT & PLAN NOTE
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 Follow up scan with more punctuate ICH and perilesional edema"  -pathology pending       "

## 2019-05-01 NOTE — ASSESSMENT & PLAN NOTE
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 Follow up scan with more punctuate ICH and perilesional edema as well as IV after severe hypertensive episode.      No acute events overnight. Pt with improving aphasia.     --Continue care per primary team.  --Continue levetiracetam 500 bid.  --Continue lacosamide 50 bid.  --Continue hourly neuromonitoring.  --Continue strict blood pressure control.  --Pt cleared from neurosurgical perspective for stepdown to floor under hospital medicine service when deemed appropriate by primary team.   --We will continue to monitor closely, please contact us with any questions or concerns.

## 2019-05-01 NOTE — ASSESSMENT & PLAN NOTE
S/p urgent left crani for clot evacuation    - Continue to monitor neurochecks q1 hr  - D/Fabian HTS 2%, starting Na tablets 2 gr q12 hr  - target Na 140, monitor BMP q12 hr  - continue levetiracetam 500 mg q12  - target plt >50K w/o active bleeding (NSGY prefers >100K, transfused with 2 units this AM)   - No SQ heparin

## 2019-05-02 NOTE — PLAN OF CARE
Problem: Physical Therapy Goal  Goal: Physical Therapy Goal  Goals to be met by: 7 days ()    Patient will increase functional independence with mobility by performin. Supine to sit with Set-up Lewis  2. Sit to supine with Set-up Lewis  3. Sit to stand transfer with Supervision  4. Gait  x 200 feet with Stand By Assistance using no assistive device  5. Lower extremity exercise program x30 reps per handout, with independence to improve muscular strength and endurance.        Outcome: Ongoing (interventions implemented as appropriate)  No goals met on today. Goals remain appropriate.    Shama Gant, PT  2019

## 2019-05-02 NOTE — PROGRESS NOTES
Bladder scan showed >1842 mL. Straight cath resulted in less than 50 mL clear yellow urine. ABD distended. Notified Dr. Bell

## 2019-05-02 NOTE — PROGRESS NOTES
ICU Progress Note  Neurocritical Care    Admit Date: 4/22/2019  LOS: 10    CC: Nontraumatic subcortical hemorrhage of left cerebral hemisphere    Code Status: Full Code     SUBJECTIVE:     Interval History/Significant Events:   Awake  Aphasic  Still requiring prn bp meds  Sp liver olt  esrd      Medications:  Continuous Infusions:   nicardipine Stopped (05/01/19 1405)     Scheduled Meds:   bisacodyl  10 mg Rectal Daily    calcitriol  1 mcg Oral BID    calcium carbonate  2,000 mg Oral TID    carvedilol  25 mg Oral BID    cloNIDine  0.3 mg Oral Q8H    famotidine  20 mg Oral Daily    hydrALAZINE  100 mg Oral Q8H    irbesartan  300 mg Oral Daily    lacosamide  50 mg Oral BID    levETIRAcetam  500 mg Oral BID    polyethylene glycol  17 g Oral Daily    senna-docusate 8.6-50 mg  2 tablet Oral Daily    sodium chloride  2 g Oral BID    tacrolimus  3 mg Oral Daily    tacrolimus  3 mg Oral Daily     PRN Meds:.sodium chloride, dextrose 50%, dextrose 50%, glucagon (human recombinant), glucose, glucose, hydrALAZINE, insulin aspart U-100, labetalol, ondansetron, oxyCODONE, sodium chloride 0.9%    OBJECTIVE:   Vital Signs (Most Recent):   Temp: 98.8 °F (37.1 °C) (05/02/19 0705)  Pulse: 82 (05/02/19 0905)  Resp: 10 (05/02/19 0905)  BP: (!) 150/85 (05/02/19 0905)  SpO2: 96 % (05/02/19 0905)    Vital Signs (24h Range):   Temp:  [97.8 °F (36.6 °C)-98.9 °F (37.2 °C)] 98.8 °F (37.1 °C)  Pulse:  [64-83] 82  Resp:  [10-29] 10  SpO2:  [93 %-100 %] 96 %  BP: (122-195)/() 150/85    ICP/CPP (Last 24h):        I & O (Last 24h):     Intake/Output Summary (Last 24 hours) at 5/2/2019 1024  Last data filed at 5/2/2019 0805  Gross per 24 hour   Intake 1575 ml   Output 2000 ml   Net -425 ml     Physical Exam:  GA: Alert, comfortable, no acute distress.   HEENT: No scleral icterus or JVD.   Pulmonary: Clear to auscultation A/P/L. No wheezing, crackles, or rhonchi.  Cardiac: RRR S1 & S2 w/o rubs/murmurs/gallops.   Abdominal: Bowel  sounds present x 4. No appreciable hepatosplenomegaly.  Skin: No jaundice, rashes, or visible lesions.    Neuro:  Awake  Aphasic  Non-focal  ox0          Lines/Drains/Airway:       none       Hemodialysis AV Fistula Left forearm (Active)   Needle Size 15ga 5/1/2019  9:30 AM   Site Assessment Clean;Dry;Intact 5/2/2019  7:05 AM   Patency Thrill;Present;Bruit 5/2/2019  7:05 AM   Status Deaccessed 5/1/2019  3:05 PM   Flows Good 5/1/2019  3:05 PM   Dressing Intervention Dressing reinforced 5/1/2019 11:01 PM   Dressing Status Clean;Dry;Intact 5/2/2019  7:05 AM   Site Condition No complications 5/2/2019  7:05 AM   Dressing Occlusive 5/1/2019  3:05 PM   Drainage Description Other (Comment) 4/14/2018  5:26 PM     Nutrition/Tube Feeds (if NPO state why):  po    Labs:  ABG: No results for input(s): PH, PO2, PCO2, HCO3, POCSATURATED, BE in the last 24 hours.  BMP:  Recent Labs   Lab 05/02/19  0843      K 4.4      CO2 23   BUN 42*   CREATININE 6.3*   *     LFT:   Lab Results   Component Value Date    AST 20 05/02/2019    ALT 9 (L) 05/02/2019     (H) 04/02/2019    ALKPHOS 369 (H) 05/02/2019    BILITOT 0.4 05/02/2019    ALBUMIN 2.4 (L) 05/02/2019    PROT 5.9 (L) 05/02/2019     CBC:   Lab Results   Component Value Date    WBC 5.16 05/02/2019    HGB 9.2 (L) 05/02/2019    HCT 28.3 (L) 05/02/2019    MCV 82 05/02/2019    PLT 78 (L) 05/02/2019     Microbiology x 7d:   Microbiology Results (last 7 days)     ** No results found for the last 168 hours. **        Imaging:   none  I personally reviewed the above image.    ASSESSMENT/PLAN:     Active Hospital Problems    Diagnosis    *Nontraumatic subcortical hemorrhage of left cerebral hemisphere    Brain tumor    Cytotoxic brain edema    Hypocalcemia    Brain lesion     Follow Na  Wean HTS      Thrombocytopenia    Seizures     No seizures in about 8 months. Episodes were likely provoked in the setting of acute illness. She should continue AED medications as  long as they are well tolerated and causing no side effects until she is seizure free about one year, then at that time we can discuss weaning medications if she chooses.      Moderate protein-calorie malnutrition    Immunosuppressed    Secondary hyperparathyroidism    Non compliance w medication regimen    Renovascular hypertension    ESRD on hemodialysis    Liver replaced by transplant     hemangioendothelioma s/p LTx (1992)              Plan:  Add norvasc  Follow bp  rrt in am  Follow exam      level;3

## 2019-05-02 NOTE — NURSING
Notified ROXIE Pak of patients sustained high BP. Pt's BP parameters are SBP < 160.     2200 BP cuff reading 180/104; 10mg Hydralazine given     Recycled cuff BP = 176/103     Orders from ROXIE Pak, to give another 10mg Hydralazine. Will carry out orders. VSS. WCTM.

## 2019-05-02 NOTE — SUBJECTIVE & OBJECTIVE
Interval History: HD completed yesterday w/o issues. UF 1.5 L. Electrolytes and acid base stable. CoCa 7.8 today. iCA 0.74.  Patient more alert today, remains hypertensive, now off of Cardene drip.     Review of patient's allergies indicates:   Allergen Reactions    Chloral hydrate Hallucinations     Other reaction(s): Hallucinations  Other reaction(s): Hives    Hydrocodone Other (See Comments)     Mental status changes    Tolerates oxycodone     Current Facility-Administered Medications   Medication Frequency    0.9%  NaCl infusion (for blood administration) Q24H PRN    bisacodyl suppository 10 mg Daily    calcitriol solution 1 mcg BID    calcium carbonate 500 mg/5 mL (1,250 mg/5 mL) suspension 2,000 mg TID    carvedilol tablet 25 mg BID    cloNIDine tablet 0.3 mg Q8H    dextrose 50% injection 12.5 g PRN    dextrose 50% injection 25 g PRN    famotidine tablet 20 mg Daily    glucagon (human recombinant) injection 1 mg PRN    glucose chewable tablet 16 g PRN    glucose chewable tablet 24 g PRN    hydrALAZINE injection 10 mg Q4H PRN    hydrALAZINE tablet 100 mg Q8H    insulin aspart U-100 pen 1-10 Units QID (AC + HS) PRN    irbesartan tablet 300 mg Daily    labetalol 20 mg/4 mL (5 mg/mL) IV syring Q4H PRN    lacosamide tablet 50 mg BID    levETIRAcetam tablet 500 mg BID    niCARdipine 40 mg/200 mL infusion Continuous    ondansetron disintegrating tablet 4 mg Q6H PRN    oxyCODONE immediate release tablet 5 mg Q6H PRN    polyethylene glycol packet 17 g Daily    senna-docusate 8.6-50 mg per tablet 2 tablet Daily    sodium chloride 0.9% flush 10 mL PRN    sodium chloride tablet 2 g BID    tacrolimus (PROGRAF) 1 mg/mL oral syringe Daily    tacrolimus (PROGRAF) 1 mg/mL oral syringe Daily       Objective:     Vital Signs (Most Recent):  Temp: 98.8 °F (37.1 °C) (05/02/19 0705)  Pulse: 80 (05/02/19 0737)  Resp: 19 (05/02/19 0737)  BP: (!) 150/90 (05/02/19 0737)  SpO2: 99 % (05/02/19 0737)  O2  Device (Oxygen Therapy): room air (05/02/19 0737) Vital Signs (24h Range):  Temp:  [97.8 °F (36.6 °C)-98.9 °F (37.2 °C)] 98.8 °F (37.1 °C)  Pulse:  [64-83] 80  Resp:  [11-29] 19  SpO2:  [93 %-100 %] 99 %  BP: (113-195)/() 150/90     Weight: 54.8 kg (120 lb 13 oz) (04/22/19 1100)  Body mass index is 22.1 kg/m².  Body surface area is 1.55 meters squared.    I/O last 3 completed shifts:  In: 1623.1 [P.O.:1050; I.V.:323.1; Other:250]  Out: 2000 [Other:2000]    Physical Exam   Constitutional: She is oriented to person, place, and time. She appears well-developed. She is sleeping. She is easily aroused. No distress.   HENT:   Right Ear: External ear normal.   Left Ear: External ear normal.   Eyes: Conjunctivae and EOM are normal. Right eye exhibits no discharge. Left eye exhibits no discharge.   Neck: Normal range of motion. Neck supple.   Cardiovascular: Normal rate and regular rhythm. Exam reveals no gallop and no friction rub.   No murmur heard.  Pulmonary/Chest: Effort normal and breath sounds normal. No respiratory distress. She has no wheezes. She has no rales.   Abdominal: Soft. Bowel sounds are normal. She exhibits no distension. There is no tenderness.   Musculoskeletal: Normal range of motion. She exhibits no edema or deformity.   Neurological: She is oriented to person, place, and time and easily aroused.   Skin: Skin is warm and dry. She is not diaphoretic.   Psychiatric: She has a normal mood and affect. Her behavior is normal.       Significant Labs:  CBC:   Recent Labs   Lab 05/02/19  0156   WBC 5.16   RBC 3.46*   HGB 9.2*   HCT 28.3*   PLT 78*   MCV 82   MCH 26.6*   MCHC 32.5     CMP:   Recent Labs   Lab 05/02/19  0156   *   CALCIUM 6.6*   ALBUMIN 2.4*   PROT 5.9*      K 4.2   CO2 24      BUN 42*   CREATININE 5.8*   ALKPHOS 369*   ALT 9*   AST 20   BILITOT 0.4

## 2019-05-02 NOTE — ASSESSMENT & PLAN NOTE
ESRD on iHD TTS  [facility]  Dr. Bass  3.5 hours  EDW ~ 50 kg  LFA AVF    Plan/Recommendations:  -no urgent need for dialysis today   -continue strict I/O's  -daily renal panel with phos added

## 2019-05-02 NOTE — TREATMENT PLAN
Treatment Plan  05/02/2019  12:56 PM    Chart reviewed and case discussed with attending.     We are following Ms. Patel for IS and recommendations are:  --Daily CMP, CBC, and INR   --Daily Tacrolimus level  --Continue current dose of Tacrolimus  --We will continue to follow alongside primary team (please promptly notify us of discharge in order to arrange for appropriate outpatient follow up).    Elinor Medeiros M.D.  Gastroenterology Fellow, PGY-V  Pager: [phone]  [facility]

## 2019-05-02 NOTE — PLAN OF CARE
05/02/19 1156   Discharge Reassessment   Assessment Type Discharge Planning Reassessment   Provided patient/caregiver education on the expected discharge date and the discharge plan Yes   Do you have any problems affording any of your prescribed medications? No   Discharge Plan A Rehab   DME Needed Upon Discharge  other (see comments)  (tbd)   Patient choice form signed by patient/caregiver N/A   Anticipated Discharge Disposition Rehab  ([facility] following patient)   Can the patient answer the patient profile reliably? No, cognitively impaired  (aphasic)   How does the patient rate their overall health at the present time?   (tabatha)   Describe the patient's ability to walk at the present time. No restrictions   How often would a person be available to care for the patient? Often   Number of comorbid conditions (as recorded on the chart) Five or more   During the past month, has the patient often been bothered by feeling down, depressed or hopeless? No   During the past month, has the patient often been bothered by little interest or pleasure in doing things? No   Post-Acute Status   Post-Acute Authorization Placement   Post-Acute Placement Status Awaiting Internal Medical Clearance   Discharge Delays None known at this time       Delma Mercado RN, CCRN-K, [facility]  Neuro-Critical Care   [phone]

## 2019-05-02 NOTE — NURSING
Notified Mellisa, NP, of patients cuff BP reading 173/105 after the additional 10mg of Hydralazine was given. SBP parameters < 160. Per Mellisa, NP, patient might need adjustment to dosing of PO meds. No new orders yet. VSS. WCTM.

## 2019-05-02 NOTE — SUBJECTIVE & OBJECTIVE
Interval History 5/2/2019:  Patient is seen for follow-up rehab evaluation and recommendations: Christi stopped this AM. Participating with therapy.     HPI, Past Medical, Family, and Social History remains the same as documented in the initial encounter.    Scheduled Medications:    bisacodyl  10 mg Rectal Daily    calcitriol  1 mcg Oral BID    calcium carbonate  2,000 mg Oral TID    calcium gluconate IVPB  3 g Intravenous Once    carvedilol  25 mg Oral BID    cloNIDine  0.3 mg Oral Q8H    famotidine  20 mg Oral Daily    hydrALAZINE  100 mg Oral Q8H    irbesartan  300 mg Oral Daily    lacosamide  50 mg Oral BID    levETIRAcetam  500 mg Oral BID    polyethylene glycol  17 g Oral BID    senna-docusate 8.6-50 mg  2 tablet Oral Daily    sodium chloride  2 g Oral BID    tacrolimus  3 mg Oral Daily    tacrolimus  3 mg Oral Daily    verapamil  240 mg Oral Daily       Diagnostic Results: Labs: Reviewed    PRN Medications: sodium chloride, dextrose 50%, dextrose 50%, glucagon (human recombinant), glucose, glucose, hydrALAZINE, insulin aspart U-100, labetalol, ondansetron, oxyCODONE, sodium chloride 0.9%    Review of Systems   Constitutional: Positive for activity change.   HENT: Positive for trouble swallowing.    Respiratory: Negative for cough and shortness of breath.    Cardiovascular: Negative for chest pain and palpitations.   Musculoskeletal: Positive for gait problem.   Neurological: Positive for speech difficulty and weakness.   Psychiatric/Behavioral: Positive for confusion. Negative for agitation.     Objective:     Vital Signs (Most Recent):  Temp: 98.8 °F (37.1 °C) (05/02/19 0705)  Pulse: 82 (05/02/19 0905)  Resp: 10 (05/02/19 0905)  BP: (!) 150/85 (05/02/19 0905)  SpO2: 96 % (05/02/19 0905)    Vital Signs (24h Range):  Temp:  [98.2 °F (36.8 °C)-98.9 °F (37.2 °C)] 98.8 °F (37.1 °C)  Pulse:  [67-83] 82  Resp:  [10-29] 10  SpO2:  [93 %-99 %] 96 %  BP: (122-195)/() 150/85     Physical Exam    Constitutional: She appears well-developed and well-nourished.   Receiving CRRT    HENT:   Head: Normocephalic and atraumatic.   Neck: Neck supple.   Pulmonary/Chest: Effort normal. No respiratory distress.   Neurological: She is disoriented. She exhibits abnormal muscle tone.   - Alert in bed  - Following commands  - Decreased muscle strength noted    Skin: Skin is warm and dry.   Psychiatric: Her behavior is normal. Cognition and memory are impaired.   Nursing note and vitals reviewed.

## 2019-05-02 NOTE — PT/OT/SLP PROGRESS
"Physical Therapy Treatment    Patient Name:  Holly Patel   MRN:  [id number]    Recommendations:     Discharge Recommendations:  rehabilitation facility   Discharge Equipment Recommendations: none   Barriers to discharge: Decreased caregiver support    Assessment:     Holly Patel is a 28 y.o. female admitted with a medical diagnosis of Nontraumatic subcortical hemorrhage of left cerebral hemisphere.  She presents with the following impairments/functional limitations:  weakness, impaired endurance, impaired functional mobilty, decreased lower extremity function, impaired balance, decreased safety awareness, impaired cardiopulmonary response to activity.    During today's session, pt able to follow one-step commands with incr time provided for choices. Remains with mild instability while ambulating in room with RW usage. Appropriate for IP Rehab to address deficits and improve overall level of functioning.     Rehab Prognosis: Good; patient would benefit from acute skilled PT services to address these deficits and reach maximum level of function.    Recent Surgery: Procedure(s) (LRB):  CRANIOTOMY-- left crani for clot evac with microscrope (Left) 10 Days Post-Op    Plan:     During this hospitalization, patient to be seen 3 x/week to address the identified rehab impairments via gait training, therapeutic activities, therapeutic exercises, neuromuscular re-education and progress toward the following goals:    · Plan of Care Expires:  05/28/19    Subjective     Chief Complaint: "My stomach feels weird"   Patient/Family Comments/goals: "Nurse" when asked where she was  Pain/Comfort:  · Pain Rating 1: 0/10      Objective:     Communicated with nsg prior to session.  Patient found supine with blood pressure cuff, peripheral IV, telemetry, pulse ox (continuous) upon PT entry to room.     General Precautions: Standard, fall, aphasia, aspiration, seizure   Orthopedic Precautions:N/A   Braces: N/A "       Functional Mobility  Bed Mobility  Supine to Sit: minimum assistance      Transfers Sit to Stand:  minimum assistance with hand-held assist for 3 trials     Gait Gait Distance: 20 ft x 2 in room  With RW  Assistance Level: minimum assistance  Description: slowed mckay with difficulty utilizing RW appropriately; req verbal cueing for sequencing and placement.          Balance   Static Sitting contact guard assistance   Dynamic Sitting minimum assistance   Static Standing minimum assistance   Dynamic Standing minimum assistance           AM-PAC 6 CLICK MOBILITY  Turning over in bed (including adjusting bedclothes, sheets and blankets)?: 3  Sitting down on and standing up from a chair with arms (e.g., wheelchair, bedside commode, etc.): 3  Moving from lying on back to sitting on the side of the bed?: 3  Need to walk in hospital room?: 3  Climbing 3-5 steps with a railing?: 2       Therapeutic Activities and Exercises:   Pt performed 3 activities to assist with following commands and standing balance  - follow one step commands 3/5 on first try attempt; 5/5 on second try attempt with pt req incr time.  - follow one step commands 5/5 on first try   - follow 2 step commands 0/2; unable to attempt again due to pt having stomach cramps.     Patient left up in chair with all lines intact and call button in reach..    GOALS:   Multidisciplinary Problems     Physical Therapy Goals        Problem: Physical Therapy Goal    Goal Priority Disciplines Outcome Goal Variances Interventions   Physical Therapy Goal     PT, PT/OT Ongoing (interventions implemented as appropriate)     Description:  Goals to be met by: 7 days ()    Patient will increase functional independence with mobility by performin. Supine to sit with Set-up Fertile  2. Sit to supine with Set-up Fertile  3. Sit to stand transfer with Supervision  4. Gait  x 200 feet with Stand By Assistance using no assistive device  5. Lower extremity  exercise program x30 reps per handout, with independence to improve muscular strength and endurance.                         Time Tracking:     PT Received On: 05/02/19  PT Start Time: 1145     PT Stop Time: 1210  PT Total Time (min): 25 min     Billable Minutes: Gait Training 15 and Therapeutic Activity 10    Treatment Type: Treatment  PT/PTA: PT     PTA Visit Number: 0     Shama Gant, PT  05/02/2019

## 2019-05-02 NOTE — PLAN OF CARE
Problem: Adult Inpatient Plan of Care  Goal: Plan of Care Review  Outcome: Ongoing (interventions implemented as appropriate)  Nutrition assessment completed. Please see RD note for details.    Recommendation/Intervention:   Continue Renal restriction with texture changes per SLP recommendations.  Novasource ONS not indicated at this time - pt consuming >75% of meals.     RD to monitor.    Goals: Pt to continue tolerating >85% EEN and EPN  Nutrition Goal Status: new  Communication of RD Recs: reviewed with RN

## 2019-05-02 NOTE — PROGRESS NOTES
"[facility]  Adult Nutrition  Progress Note    SUMMARY       Recommendations    Recommendation/Intervention:   Continue Renal restriction with texture changes per SLP recommendations.  Novasource ONS not indicated at this time - pt consuming >75% of meals.     RD to monitor.    Goals: Pt to continue tolerating >85% EEN and EPN  Nutrition Goal Status: new  Communication of RD Recs: reviewed with RN    Reason for Assessment    Reason For Assessment: length of stay  Diagnosis: hemorrhage  Relevant Medical History: liver tx, ESRD on HD, epilepsy  Interdisciplinary Rounds: attended  General Information Comments: Pt's diet advanced per SLP recommendations. Consuming % of meals per nsg, tolerating appropriately. Pt confused, unable to obtain nutrition hx as no family at bedside. Noted weight stable PTA  with approx 115lb UBW. No indications for malnutrition at this time.  Nutrition Discharge Planning: adequate po intake for optimal nutrition with Renal restriction    Nutrition Risk Screen    Nutrition Risk Screen: no indicators present    Nutrition/Diet History    Spiritual, Cultural Beliefs, Methodist Practices, Values that Affect Care: no  Factors Affecting Nutritional Intake: chewing difficulties/inability to chew food    Anthropometrics    Temp: 98.8 °F (37.1 °C)  Height Method: Measured  Height: 5' 2" (157.5 cm)  Height (inches): 62 in  Weight Method: Bed Scale  Weight: 54.8 kg (120 lb 13 oz)  Weight (lb): 120.81 lb  Ideal Body Weight (IBW), Female: 110 lb  % Ideal Body Weight, Female (lb): 109.83 lb  BMI (Calculated): 22.1  BMI Grade: 18.5-24.9 - normal       Lab/Procedures/Meds    Pertinent Labs Reviewed: reviewed  Pertinent Labs Comments: BUN 42, Cr 5.8, POCT Glu 104-179  Pertinent Medications Reviewed: reviewed  Pertinent Medications Comments: prograf, cardene    Estimated/Assessed Needs    Weight Used For Calorie Calculations: 54.8 kg (120 lb 13 oz)  Energy Calorie Requirements (kcal): " 1918  Energy Need Method: Kcal/kg(35kcal/kg)  Protein Requirements: 66-77g(1.2-1.4g/kg)  Weight Used For Protein Calculations: 54.8 kg (120 lb 13 oz)  Fluid Requirements (mL): 1mL/kcal or per MD     RDA Method (mL): 1918         Nutrition Prescription Ordered    Current Diet Order: Mechanical soft, renal    Evaluation of Received Nutrient/Fluid Intake       % Intake of Estimated Energy Needs: 75 - 100 %  % Meal Intake: 75 - 100 %    Nutrition Risk    Level of Risk/Frequency of Follow-up: (f/u 1x/week)     Assessment and Plan     No nutrition diagnosis at this time.    Monitor and Evaluation    Food and Nutrient Intake: energy intake, food and beverage intake  Food and Nutrient Adminstration: diet order  Anthropometric Measurements: weight, weight change, body mass index  Biochemical Data, Medical Tests and Procedures: electrolyte and renal panel, gastrointestinal profile, glucose/endocrine profile, inflammatory profile, lipid profile  Nutrition-Focused Physical Findings: overall appearance        Nutrition Follow-Up    RD Follow-up?: Yes

## 2019-05-02 NOTE — PLAN OF CARE
Problem: SLP Goal  Goal: SLP Goal  Revised goals to be met 5/9  1. Pt will tolerate diet of thin liquids and dental soft solids without overt clinical signs of aspiration   2. Pt will participate in trials of regular solids within speech therapy sessions to help determine least restrictive diet   3. Pt will demonstrate orientation to place, situation , family members, date w/ mod-max cues   4. Pt will generate 2 items per category w/ mod cues to enhance organizations skills   5. Pt will label items w/ 60%acc w/ mod cues to enhance verbal expression skills   6. Pt will participate in ongoing assessment of speech language and cognitive linguistic skills to help rule out deficits and determine therapeutic plan of care        Goals to be met 5/2    1. Pt will tolerate diet of thin liquids and dental soft solids without overt clinical signs of aspiration   2. Pt will participate in trials of regular solids within speech therapy sessions to help determine least restrictive diet   3. Pt will demonstrate orientation to place, situation , family members, date w/ min cues. Goal not met/ongoing  4. Pt will complete category inclusions task w/ 80% acc w/ min cues. Goal not met  5. Pt will generate 3 items per category w/ min cues to enhance organizations skills. Goal not met  6. Pt will label items w/ 80%acc w/ mincues to enhance verbal expression skills. Goal not met  7. Pt will participate in ongoing assessment of speech language and cognitive linguistic skills to help rule out deficits and determine therapeutic plan of care. ongoing     Outcome: Revised  Goals revised. Cont POC.   ROM Fritz, CCC-SLP  Speech Language Pathologist  [phone]  5/2/2019

## 2019-05-02 NOTE — PLAN OF CARE
Problem: Adult Inpatient Plan of Care  Goal: Plan of Care Review  Outcome: Ongoing (interventions implemented as appropriate)  No acute events throughout the night, VS and assessment per flow sheet, patient progressing towards goal as tolerated. Plan of care reviewed with Holly Patel at 0400, all concerns addressed. Will continue to monitor.

## 2019-05-02 NOTE — PROGRESS NOTES
[facility]  Physical Medicine & Rehab  Progress Note    Patient Name: Holly Patel  MRN: [id number]  Admission Date: 4/22/2019  Length of Stay: 10 days  Attending Physician: Karson Bermeo MD    Subjective:     Principal Problem: Nontraumatic subcortical hemorrhage of left cerebral hemisphere    Hospital Course:   04/26/2019: Participated with OT.  Bed mobility CGA-Maddy.  Sit to stand and transfers Maddy.  Ambulated  4 steps Madyd with mild dizziness.  UBD/grooming Maddy and Feeding CGA.  04/29/2019: Participated with PT.  Bed mobility Maddy.  Sit to stand and transfers Maddy.  Ambulated 5 steps Maddy. Passed bedside swallow evaluation.  SLP recommending dental soft diet and thin liquids. SLP diagnosis of Aphasia and Cognitive-Linguistic Impairment.   04/30/2019: Participated with therapy.  Bed mobility CGA-Maddy.  Sit to stand CGA and transfers Maddy.  Ambulated 12 ft and 12 ft Maddy-CGA. No grooming.   5/1/19: Participated w/ OT. Static standing CGA. Endurance exercises x 5 sit to stand CGA. Declined adls.       Interval History 5/2/2019:  Patient is seen for follow-up rehab evaluation and recommendations: Christi stopped this AM. Participating with therapy.     HPI, Past Medical, Family, and Social History remains the same as documented in the initial encounter.    Scheduled Medications:    bisacodyl  10 mg Rectal Daily    calcitriol  1 mcg Oral BID    calcium carbonate  2,000 mg Oral TID    calcium gluconate IVPB  3 g Intravenous Once    carvedilol  25 mg Oral BID    cloNIDine  0.3 mg Oral Q8H    famotidine  20 mg Oral Daily    hydrALAZINE  100 mg Oral Q8H    irbesartan  300 mg Oral Daily    lacosamide  50 mg Oral BID    levETIRAcetam  500 mg Oral BID    polyethylene glycol  17 g Oral BID    senna-docusate 8.6-50 mg  2 tablet Oral Daily    sodium chloride  2 g Oral BID    tacrolimus  3 mg Oral Daily    tacrolimus  3 mg Oral Daily    verapamil  240 mg Oral Daily        Diagnostic Results:   Labs: Reviewed    PRN Medications: sodium chloride, dextrose 50%, dextrose 50%, glucagon (human recombinant), glucose, glucose, hydrALAZINE, insulin aspart U-100, labetalol, ondansetron, oxyCODONE, sodium chloride 0.9%    Review of Systems   Constitutional: Positive for activity change.   HENT: Positive for trouble swallowing.    Respiratory: Negative for cough and shortness of breath.    Cardiovascular: Negative for chest pain and palpitations.   Musculoskeletal: Positive for gait problem.   Neurological: Positive for speech difficulty and weakness.   Psychiatric/Behavioral: Positive for confusion. Negative for agitation.     Objective:     Vital Signs (Most Recent):  Temp: 98.8 °F (37.1 °C) (05/02/19 0705)  Pulse: 82 (05/02/19 0905)  Resp: 10 (05/02/19 0905)  BP: (!) 150/85 (05/02/19 0905)  SpO2: 96 % (05/02/19 0905)    Vital Signs (24h Range):  Temp:  [98.2 °F (36.8 °C)-98.9 °F (37.2 °C)] 98.8 °F (37.1 °C)  Pulse:  [67-83] 82  Resp:  [10-29] 10  SpO2:  [93 %-99 %] 96 %  BP: (122-195)/() 150/85     Physical Exam   Constitutional: She appears well-developed and well-nourished.   Receiving CRRT    HENT:   Head: Normocephalic and atraumatic.   Neck: Neck supple.   Pulmonary/Chest: Effort normal. No respiratory distress.   Neurological:    - Alert in bed  - Following commands  - Decreased muscle strength noted    Skin: Skin is warm and dry.   Psychiatric: Her behavior is normal. Cognition and memory are impaired.   Nursing note and vitals reviewed.      Assessment/Plan:      * Nontraumatic subcortical hemorrhage of left cerebral hemisphere  -S/p urgent left crani for clot evacuation  -NSGY following    See hospital course for functional, cognitive/speech/language, and nutrition/swallow status.      Recommendations  -  Encourage mobility, OOB in chair at least 3 hours per day, and early ambulation as appropriate   -  PT/OT evaluate and treat  -  SLP speech and cognitive evaluate and  treat  -  Monitor sleep disturbances and establish consistent sleep-wake cycle  -  Monitor for bowel and bladder dysfunction  -  Monitor for shoulder pain, subluxation, & spasticity  -  Monitor for and prevent skin breakdown and pressure ulcers  · Early mobility, repositioning/weight shifting every 20-30 minutes when sitting, turn patient every 2 hours, proper mattress/overlay and chair cushioning, pressure relief/heel protector boots  -  DVT prophylaxis (if appropriate)  -  Reviewed discharge options (IP rehab, SNF, HH therapy, and OP therapy)      Hypocalcemia  -nephrology managing     Renovascular hypertension  -Cardene stopped 5/2    ESRD on hemodialysis  -nephrology following  -CRRT in room 5/1    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 Diaz.     Marilu Valenzuela NP  Department of Physical Medicine & Rehab   [facility]

## 2019-05-02 NOTE — SUBJECTIVE & OBJECTIVE
Off Cardene this am. No issues per nursing. Exam stable. PLT 78, transfusing one unit.     Medications:  Continuous Infusions:   nicardipine Stopped (05/01/19 1405)     Scheduled Meds:   bisacodyl  10 mg Rectal Daily    calcitriol  1 mcg Oral BID    calcium carbonate  2,000 mg Oral TID    carvedilol  25 mg Oral BID    cloNIDine  0.3 mg Oral Q8H    famotidine  20 mg Oral Daily    hydrALAZINE  100 mg Oral Q8H    irbesartan  300 mg Oral Daily    lacosamide  50 mg Oral BID    levETIRAcetam  500 mg Oral BID    polyethylene glycol  17 g Oral BID    senna-docusate 8.6-50 mg  2 tablet Oral Daily    sodium chloride  2 g Oral BID    tacrolimus  3 mg Oral Daily    tacrolimus  3 mg Oral Daily    verapamil  240 mg Oral Daily     PRN Meds:sodium chloride, dextrose 50%, dextrose 50%, glucagon (human recombinant), glucose, glucose, hydrALAZINE, insulin aspart U-100, labetalol, ondansetron, oxyCODONE, sodium chloride 0.9%     Review of Systems     Unable to aphasia   Objective:     Weight: 54.8 kg (120 lb 13 oz)  Body mass index is 22.1 kg/m².  Vital Signs (Most Recent):  Temp: 98.8 °F (37.1 °C) (05/02/19 0705)  Pulse: 75 (05/02/19 1205)  Resp: 20 (05/02/19 1205)  BP: (!) 159/99 (05/02/19 1205)  SpO2: 99 % (05/02/19 1205) Vital Signs (24h Range):  Temp:  [98.2 °F (36.8 °C)-98.9 °F (37.2 °C)] 98.8 °F (37.1 °C)  Pulse:  [69-83] 75  Resp:  [10-31] 20  SpO2:  [93 %-99 %] 99 %  BP: (125-195)/() 159/99     Date 05/02/19 0700 - 05/03/19 0659   Shift 7277-9123 4670-8528 6698-5366 24 Hour Total   INTAKE   P.O. 300   300   IV Piggyback 100   100   Shift Total(mL/kg) 400(7.3)   400(7.3)   OUTPUT   Shift Total(mL/kg)       Weight (kg) 54.8 54.8 54.8 54.8                        Hemodialysis AV Fistula Left forearm (Active)   Needle Size 15ga 4/25/2019  1:20 PM   Site Assessment Clean;Dry;Intact 4/25/2019  1:20 PM   Patency Present;Thrill;Bruit 4/25/2019  1:20 PM   Status Accessed 4/25/2019  1:20 PM   Flows Good 4/25/2019   3:05 AM   Dressing Intervention Dressing reinforced 4/25/2019  3:05 AM   Dressing Status Clean;Intact;Dry 4/25/2019 11:01 AM   Site Condition No complications 4/25/2019 11:01 AM   Dressing Occlusive 4/25/2019 11:01 AM   Drainage Description Other (Comment) 4/14/2018  5:26 PM       Neurosurgery Physical Exam  [id number]  AAO1 name  SHRAVAN   Partial transcortical motor aphasia improving   Move L side and RLE AG, RUE paresis with some effort against gravity   Wound c/d/i        Significant Labs:  Recent Labs   Lab 05/01/19  0803 05/01/19  1934 05/02/19  0156 05/02/19  0843    129* 148* 152*    137 137 138   K 5.0 4.2 4.2 4.4    103 103 104   CO2 20* 25 24 23   BUN 67* 39* 42* 42*   CREATININE 7.8* 5.3* 5.8* 6.3*   CALCIUM 5.5* 6.7* 6.6* 6.5*   MG 2.0  --   --   --      Recent Labs   Lab 05/01/19  0046 05/02/19  0156   WBC 4.09 5.16   HGB 8.9* 9.2*   HCT 27.0* 28.3*   PLT 91* 78*     Recent Labs   Lab 05/01/19  0046 05/02/19  0156   INR 1.3* 1.3*   APTT 27.4 27.1     Microbiology Results (last 7 days)     ** No results found for the last 168 hours. **        All pertinent labs from the last 24 hours have been reviewed.    Significant Diagnostics:  CT: Ct Head Without Contrast    Result Date: 4/25/2019  Evolving operative change from left temporal craniectomy and cranioplasty for left posterior temporal parenchymal hematoma evacuation. There is reduced postoperative pneumocephalus with small volumes of increased parenchymal hemorrhage within and about the resection cavity. Evolving mass effect and edema with continued 5-6 mm of rightward midline shift similar to prior. There is continued small volume intraventricular hemorrhage with slight increase distension of the lateral ventricles concerning for component of evolving hydrocephalus. Continued diffuse effacement cerebral sulci at the vertex concerning for sequela of cerebral edema or evolving hydrocephalus Electronically signed by: Reggie Espinoza DO  Date:    04/25/2019 Time:    15:50

## 2019-05-02 NOTE — PROGRESS NOTES
Notified ROXIE Pak, of patients cuff BP reading 175/109 after administering 20mg Labetalol. New orders placed to give 40mg Labetalol. VSS. TM. Will carry out orders.

## 2019-05-02 NOTE — PROGRESS NOTES
[facility]  Nephrology  Progress Note    Patient Name: Holly Patel  MRN: [id number]  Admission Date: 4/22/2019  Hospital Length of Stay: 10 days  Attending Provider: Karson Bermeo MD   Primary Care Physician: Stan Sosa MD  Principal Problem:Nontraumatic subcortical hemorrhage of left cerebral hemisphere    Subjective:     Interval History: HD completed yesterday w/o issues. UF 1.5 L. Electrolytes and acid base stable. CoCa 7.8 today. iCA 0.74.  Patient more alert today, remains hypertensive, now off of Cardene drip.     Review of patient's allergies indicates:   Allergen Reactions    Chloral hydrate Hallucinations     Other reaction(s): Hallucinations  Other reaction(s): Hives    Hydrocodone Other (See Comments)     Mental status changes    Tolerates oxycodone     Current Facility-Administered Medications   Medication Frequency    0.9%  NaCl infusion (for blood administration) Q24H PRN    bisacodyl suppository 10 mg Daily    calcitriol solution 1 mcg BID    calcium carbonate 500 mg/5 mL (1,250 mg/5 mL) suspension 2,000 mg TID    carvedilol tablet 25 mg BID    cloNIDine tablet 0.3 mg Q8H    dextrose 50% injection 12.5 g PRN    dextrose 50% injection 25 g PRN    famotidine tablet 20 mg Daily    glucagon (human recombinant) injection 1 mg PRN    glucose chewable tablet 16 g PRN    glucose chewable tablet 24 g PRN    hydrALAZINE injection 10 mg Q4H PRN    hydrALAZINE tablet 100 mg Q8H    insulin aspart U-100 pen 1-10 Units QID (AC + HS) PRN    irbesartan tablet 300 mg Daily    labetalol 20 mg/4 mL (5 mg/mL) IV syring Q4H PRN    lacosamide tablet 50 mg BID    levETIRAcetam tablet 500 mg BID    niCARdipine 40 mg/200 mL infusion Continuous    ondansetron disintegrating tablet 4 mg Q6H PRN    oxyCODONE immediate release tablet 5 mg Q6H PRN    polyethylene glycol packet 17 g Daily    senna-docusate 8.6-50 mg per tablet 2 tablet Daily    sodium chloride 0.9%  flush 10 mL PRN    sodium chloride tablet 2 g BID    tacrolimus (PROGRAF) 1 mg/mL oral syringe Daily    tacrolimus (PROGRAF) 1 mg/mL oral syringe Daily       Objective:     Vital Signs (Most Recent):  Temp: 98.8 °F (37.1 °C) (05/02/19 0705)  Pulse: 80 (05/02/19 0737)  Resp: 19 (05/02/19 0737)  BP: (!) 150/90 (05/02/19 0737)  SpO2: 99 % (05/02/19 0737)  O2 Device (Oxygen Therapy): room air (05/02/19 0737) Vital Signs (24h Range):  Temp:  [97.8 °F (36.6 °C)-98.9 °F (37.2 °C)] 98.8 °F (37.1 °C)  Pulse:  [64-83] 80  Resp:  [11-29] 19  SpO2:  [93 %-100 %] 99 %  BP: (113-195)/() 150/90     Weight: 54.8 kg (120 lb 13 oz) (04/22/19 1100)  Body mass index is 22.1 kg/m².  Body surface area is 1.55 meters squared.    I/O last 3 completed shifts:  In: 1623.1 [P.O.:1050; I.V.:323.1; Other:250]  Out: 2000 [Other:2000]    Physical Exam   Constitutional: She is oriented to person, place, and time. She appears well-developed. She is sleeping. She is easily aroused. No distress.   HENT:   Right Ear: External ear normal.   Left Ear: External ear normal.   Eyes: Conjunctivae and EOM are normal. Right eye exhibits no discharge. Left eye exhibits no discharge.   Neck: Normal range of motion. Neck supple.   Cardiovascular: Normal rate and regular rhythm. Exam reveals no gallop and no friction rub.   No murmur heard.  Pulmonary/Chest: Effort normal and breath sounds normal. No respiratory distress. She has no wheezes. She has no rales.   Abdominal: Soft. Bowel sounds are normal. She exhibits no distension. There is no tenderness.   Musculoskeletal: Normal range of motion. She exhibits no edema or deformity.   Neurological: She is oriented to person, place, and time and easily aroused.   Skin: Skin is warm and dry. She is not diaphoretic.   Psychiatric: She has a normal mood and affect. Her behavior is normal.       Significant Labs:  CBC:   Recent Labs   Lab 05/02/19  0156   WBC 5.16   RBC 3.46*   HGB 9.2*   HCT 28.3*   PLT 78*    MCV 82   MCH 26.6*   MCHC 32.5     CMP:   Recent Labs   Lab 05/02/19  0156   *   CALCIUM 6.6*   ALBUMIN 2.4*   PROT 5.9*      K 4.2   CO2 24      BUN 42*   CREATININE 5.8*   ALKPHOS 369*   ALT 9*   AST 20   BILITOT 0.4            Assessment/Plan:     * Nontraumatic subcortical hemorrhage of left cerebral hemisphere  - Being follow by admitting service     Hypocalcemia  2/2 non-compliance with activated vit D and calcium supplementation s/p parathyroidectomy  -PTH elevated @ 407  -phos levels daily  -continue calcitriol 1 mcg po BID  -continue Calcium Carbonate 2 g TID   -consider calcium gluconate 3 mg IVPB x 1       ESRD on hemodialysis  ESRD on iHD TTS  [facility]  Dr. Bass  3.5 hours  EDW ~ 50 kg  LFA AVF    Plan/Recommendations:  -no urgent need for dialysis today   -continue strict I/O's  -daily renal panel with phos added      Case discussed with staff further recs with attestation.     I will follow-up with patient. Please contact us if you have any additional questions.    TED Russell, DNP, APRN, FNP-C  Department of Nephrology  [facility]  Pager: [phone]

## 2019-05-02 NOTE — ASSESSMENT & PLAN NOTE
2/2 non-compliance with activated vit D and calcium supplementation s/p parathyroidectomy  -PTH elevated @ 407  -phos levels daily  -continue calcitriol 1 mcg po BID  -continue Calcium Carbonate 2 g TID   -consider calcium gluconate 3 mg IVPB x 1

## 2019-05-02 NOTE — PROGRESS NOTES
[facility]  Neurosurgery  Progress Note    Subjective:         Post-Op Info:  Procedure(s) (LRB):  CRANIOTOMY-- left crani for clot evac with microscrope (Left)   10 Days Post-Op     Off Cardene this am. No issues per nursing. Exam stable. PLT 78, transfusing one unit.     Medications:  Continuous Infusions:   nicardipine Stopped (05/01/19 1405)     Scheduled Meds:   bisacodyl  10 mg Rectal Daily    calcitriol  1 mcg Oral BID    calcium carbonate  2,000 mg Oral TID    carvedilol  25 mg Oral BID    cloNIDine  0.3 mg Oral Q8H    famotidine  20 mg Oral Daily    hydrALAZINE  100 mg Oral Q8H    irbesartan  300 mg Oral Daily    lacosamide  50 mg Oral BID    levETIRAcetam  500 mg Oral BID    polyethylene glycol  17 g Oral BID    senna-docusate 8.6-50 mg  2 tablet Oral Daily    sodium chloride  2 g Oral BID    tacrolimus  3 mg Oral Daily    tacrolimus  3 mg Oral Daily    verapamil  240 mg Oral Daily     PRN Meds:sodium chloride, dextrose 50%, dextrose 50%, glucagon (human recombinant), glucose, glucose, hydrALAZINE, insulin aspart U-100, labetalol, ondansetron, oxyCODONE, sodium chloride 0.9%     Review of Systems     Unable to aphasia   Objective:     Weight: 54.8 kg (120 lb 13 oz)  Body mass index is 22.1 kg/m².  Vital Signs (Most Recent):  Temp: 98.8 °F (37.1 °C) (05/02/19 0705)  Pulse: 75 (05/02/19 1205)  Resp: 20 (05/02/19 1205)  BP: (!) 159/99 (05/02/19 1205)  SpO2: 99 % (05/02/19 1205) Vital Signs (24h Range):  Temp:  [98.2 °F (36.8 °C)-98.9 °F (37.2 °C)] 98.8 °F (37.1 °C)  Pulse:  [69-83] 75  Resp:  [10-31] 20  SpO2:  [93 %-99 %] 99 %  BP: (125-195)/() 159/99     Date 05/02/19 0700 - 05/03/19 0659   Shift 2182-5117 2660-8573 9765-8929 24 Hour Total   INTAKE   P.O. 300   300   IV Piggyback 100   100   Shift Total(mL/kg) 400(7.3)   400(7.3)   OUTPUT   Shift Total(mL/kg)       Weight (kg) 54.8 54.8 54.8 54.8                        Hemodialysis AV Fistula Left forearm (Active)    Needle Size 15ga 4/25/2019  1:20 PM   Site Assessment Clean;Dry;Intact 4/25/2019  1:20 PM   Patency Present;Thrill;Bruit 4/25/2019  1:20 PM   Status Accessed 4/25/2019  1:20 PM   Flows Good 4/25/2019  3:05 AM   Dressing Intervention Dressing reinforced 4/25/2019  3:05 AM   Dressing Status Clean;Intact;Dry 4/25/2019 11:01 AM   Site Condition No complications 4/25/2019 11:01 AM   Dressing Occlusive 4/25/2019 11:01 AM   Drainage Description Other (Comment) 4/14/2018  5:26 PM       Neurosurgery Physical Exam  [id number]  AAO1 name  SHRAVAN   Partial transcortical motor aphasia improving   Move L side and RLE AG, RUE paresis with some effort against gravity   Wound c/d/i        Significant Labs:  Recent Labs   Lab 05/01/19  0803 05/01/19  1934 05/02/19  0156 05/02/19  0843    129* 148* 152*    137 137 138   K 5.0 4.2 4.2 4.4    103 103 104   CO2 20* 25 24 23   BUN 67* 39* 42* 42*   CREATININE 7.8* 5.3* 5.8* 6.3*   CALCIUM 5.5* 6.7* 6.6* 6.5*   MG 2.0  --   --   --      Recent Labs   Lab 05/01/19  0046 05/02/19  0156   WBC 4.09 5.16   HGB 8.9* 9.2*   HCT 27.0* 28.3*   PLT 91* 78*     Recent Labs   Lab 05/01/19  0046 05/02/19  0156   INR 1.3* 1.3*   APTT 27.4 27.1     Microbiology Results (last 7 days)     ** No results found for the last 168 hours. **        All pertinent labs from the last 24 hours have been reviewed.    Significant Diagnostics:  CT: Ct Head Without Contrast    Result Date: 4/25/2019  Evolving operative change from left temporal craniectomy and cranioplasty for left posterior temporal parenchymal hematoma evacuation. There is reduced postoperative pneumocephalus with small volumes of increased parenchymal hemorrhage within and about the resection cavity. Evolving mass effect and edema with continued 5-6 mm of rightward midline shift similar to prior. There is continued small volume intraventricular hemorrhage with slight increase distension of the lateral ventricles concerning for  component of evolving hydrocephalus. 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 Follow up scan with more punctuate ICH and perilesional edema as well as IV after severe hypertensive episode.      No acute events overnight. Pt with improving aphasia.     --Continue care per primary team.  --Continue levetiracetam 500 bid.  --Continue lacosamide 50 bid.  --Continue hourly neuromonitoring.  --Continue strict blood pressure control.  --Pt cleared from neurosurgical perspective for stepdown to floor under hospital medicine service when deemed appropriate by primary team.   --We will continue to monitor closely, please contact us with any questions or concerns.            Kimberly Roy MD  Neurosurgery  [facility]

## 2019-05-02 NOTE — PLAN OF CARE
Problem: Adult Inpatient Plan of Care  Goal: Plan of Care Review  Outcome: Ongoing (interventions implemented as appropriate)  POC reviewed with pt and mother at 1400. Pts mother verbalized understanding due to pts expressive aphasia. Questions and concerns addressed. No acute events today. PRN hydralazine administered x2 and PRN labetelol administered x2  Pt progressing toward goals. Will continue to monitor. See flowsheets for full assessment and VS info.

## 2019-05-02 NOTE — PT/OT/SLP PROGRESS
"Speech Language Pathology Treatment    Patient Name:  Holly Patel   MRN:  [id number]  Admitting Diagnosis: Nontraumatic subcortical hemorrhage of left cerebral hemisphere    Recommendations:                 General Recommendations:  Speech/language therapy  Diet recommendations:  Dental Soft, Liquid Diet Level: Thin   Aspiration Precautions: 1 bite/sip at a time, Alternating bites/sips, Assistance with meals, Avoid talking while eating, Eliminate distractions, Feed only when awake/alert, Frequent oral care, HOB to 90 degrees, Monitor for s/s of aspiration, Small bites/sips and Strict aspiration precautions   General Precautions: Standard, aspiration, aphasia, fall, seizure  Communication strategies:  go to room if call light pushed; pt with aphasia    Subjective     "juice" pt began to perseverate with this response for most responses during a naming task    Pain/Comfort:  · Pain Rating 1: 0/10    Objective:     Has the patient been evaluated by SLP for swallowing?   Yes  Keep patient NPO? No   Current Respiratory Status: room air      Pt found asleep in bed with mother, niece, and nephew present this afternoon.  Pt able to rouse to verbal and tactile stimulation, but had difficulty sustaining alertness and required continued cues to alert t/o session.  Pt was able to state that her niece and nephew were present in room, but did not recall their names correctly.  Pt was not oriented to place/hospital despite max cues and binary choices, but stated "[place]" ind'ly when asked to recall name of place.  Pt responded with "2018" when asked to recall current year, but cues were not effective in recalling correct year.  Pt also did not orient to month despite max cues.  Pt stated her month and date of birth, but not year of birth. She stated her current age correctly.  Pt completed a confrontational naming task naming objects in the room with 75% accuracy given max phonemic cues. Pt perseverating on "juice" for " most responses.  Continued education provided to pt regarding role of SLP, orientation strategies, naming strategies, and SLP POC.  SLP dicussed pt's progress and performance today with pt's mother.  Pt unable to demonstrate understanding, but mother did.     Assessment:     Holly Patel is a 28 y.o. female with an SLP diagnosis of Aphasia and Cognitive-Linguistic Impairment.      Goals:   Multidisciplinary Problems     SLP Goals        Problem: SLP Goal    Goal Priority Disciplines Outcome   SLP Goal     SLP Revised   Description:  Revised goals to be met 5/9  1. Pt will tolerate diet of thin liquids and dental soft solids without overt clinical signs of aspiration   2. Pt will participate in trials of regular solids within speech therapy sessions to help determine least restrictive diet   3. Pt will demonstrate orientation to place, situation , family members, date w/ mod-max cues   4. Pt will generate 2 items per category w/ mod cues to enhance organizations skills   5. Pt will label items w/ 60%acc w/ mod cues to enhance verbal expression skills   6. Pt will participate in ongoing assessment of speech language and cognitive linguistic skills to help rule out deficits and determine therapeutic plan of care        Goals to be met 5/2    1. Pt will tolerate diet of thin liquids and dental soft solids without overt clinical signs of aspiration   2. Pt will participate in trials of regular solids within speech therapy sessions to help determine least restrictive diet   3. Pt will demonstrate orientation to place, situation , family members, date w/ min cues. Goal not met/ongoing  4. Pt will complete category inclusions task w/ 80% acc w/ min cues. Goal not met  5. Pt will generate 3 items per category w/ min cues to enhance organizations skills. Goal not met  6. Pt will label items w/ 80%acc w/ mincues to enhance verbal expression skills. Goal not met  7. Pt will participate in ongoing assessment of speech  language and cognitive linguistic skills to help rule out deficits and determine therapeutic plan of care. ongoing                       Plan:     · Patient to be seen:  4 x/week   · Plan of Care expires:     · Plan of Care reviewed with:  patient, mother, family   · SLP Follow-Up:  Yes       Discharge recommendations:  rehabilitation facility     Time Tracking:     SLP Treatment Date:   05/02/19  Speech Start Time:  1528  Speech Stop Time:  1544     Speech Total Time (min):  16 min    Billable Minutes: Speech Therapy Individual 8 and [facility]/Home Management Training 8    ROM Fritz, CCC-SLP  05/02/2019     ROM Fritz, CCC-SLP  Speech Language Pathologist  [phone]  5/2/2019

## 2019-05-03 NOTE — SUBJECTIVE & OBJECTIVE
Interval History: NAEON. Patient net positive 1.1 L/24 hr. CoCa improving slowly, now 8.3 today.     Review of patient's allergies indicates:   Allergen Reactions    Chloral hydrate Hallucinations     Other reaction(s): Hallucinations  Other reaction(s): Hives    Hydrocodone Other (See Comments)     Mental status changes    Tolerates oxycodone     Current Facility-Administered Medications   Medication Frequency    0.9%  NaCl infusion (for blood administration) Q24H PRN    0.9%  NaCl infusion (for blood administration) Q24H PRN    0.9%  NaCl infusion Once    0.9%  NaCl infusion Once    bisacodyl suppository 10 mg Daily    calcitriol solution 1 mcg BID    calcium carbonate 500 mg/5 mL (1,250 mg/5 mL) suspension 2,000 mg TID    carvedilol tablet 25 mg BID    cloNIDine tablet 0.3 mg Q8H    dextrose 50% injection 12.5 g PRN    dextrose 50% injection 25 g PRN    famotidine tablet 20 mg Daily    glucagon (human recombinant) injection 1 mg PRN    glucose chewable tablet 16 g PRN    glucose chewable tablet 24 g PRN    hydrALAZINE injection 10 mg Q4H PRN    hydrALAZINE tablet 100 mg Q8H    insulin aspart U-100 pen 1-10 Units QID (AC + HS) PRN    irbesartan tablet 300 mg Daily    labetalol 20 mg/4 mL (5 mg/mL) IV syring Q4H PRN    lacosamide tablet 50 mg BID    levETIRAcetam tablet 500 mg BID    niCARdipine 40 mg/200 mL infusion Continuous    ondansetron disintegrating tablet 4 mg Q6H PRN    oxyCODONE immediate release tablet 5 mg Q6H PRN    polyethylene glycol packet 17 g BID    senna-docusate 8.6-50 mg per tablet 2 tablet Daily    sodium chloride 0.9% flush 10 mL PRN    sodium chloride tablet 2 g BID    tacrolimus (PROGRAF) 1 mg/mL oral syringe Daily    tacrolimus (PROGRAF) 1 mg/mL oral syringe Daily    verapamil CR tablet 240 mg Daily       Objective:     Vital Signs (Most Recent):  Temp: 98.5 °F (36.9 °C) (05/03/19 0705)  Pulse: 77 (05/03/19 0805)  Resp: (!) 29 (05/03/19 0805)  BP: (!)  154/103 (05/03/19 0805)  SpO2: 99 % (05/03/19 0805)  O2 Device (Oxygen Therapy): room air (05/03/19 0805) Vital Signs (24h Range):  Temp:  [98.2 °F (36.8 °C)-98.5 °F (36.9 °C)] 98.5 °F (36.9 °C)  Pulse:  [74-82] 77  Resp:  [10-32] 29  SpO2:  [96 %-100 %] 99 %  BP: (149-177)/() 154/103     Weight: 54.8 kg (120 lb 13 oz) (05/02/19 0900)  Body mass index is 22.1 kg/m².  Body surface area is 1.55 meters squared.    I/O last 3 completed shifts:  In: 2149.8 [P.O.:1800; Blood:249.8; IV Piggyback:100]  Out: -     Physical Exam   Constitutional: She is oriented to person, place, and time. She appears well-developed. She is sleeping. She is easily aroused. No distress.   HENT:   Right Ear: External ear normal.   Left Ear: External ear normal.   Eyes: Conjunctivae and EOM are normal. Right eye exhibits no discharge. Left eye exhibits no discharge.   Neck: Normal range of motion. Neck supple.   Cardiovascular: Normal rate and regular rhythm. Exam reveals no gallop and no friction rub.   No murmur heard.  Pulmonary/Chest: Effort normal and breath sounds normal. No respiratory distress. She has no wheezes. She has no rales.   Abdominal: Soft. Bowel sounds are normal. She exhibits no distension. There is no tenderness.   Musculoskeletal: Normal range of motion. She exhibits no edema or deformity.   Neurological: She is oriented to person, place, and time and easily aroused.   Skin: Skin is warm and dry. She is not diaphoretic.   Psychiatric: She has a normal mood and affect. Her behavior is normal.     Significant Labs:  CBC:   Recent Labs   Lab 05/03/19  0300   WBC 3.88*   RBC 3.08*   HGB 8.3*   HCT 26.3*   PLT 77*   MCV 85   MCH 26.9*   MCHC 31.6*     CMP:   Recent Labs   Lab 05/03/19  0300   *  124*   CALCIUM 7.0*  7.0*   ALBUMIN 2.3*   PROT 5.7*     138   K 4.9  4.9   CO2 24  24     105   BUN 49*  49*   CREATININE 7.1*  7.1*   ALKPHOS 353*   ALT 8*   AST 21   BILITOT 0.5

## 2019-05-03 NOTE — ASSESSMENT & PLAN NOTE
Ca 7.0  - Continue Calcitriol to 1 mcg q12 hr  - Continue Calcium carbonate 2000 mg q8 hr  - Nephrology following

## 2019-05-03 NOTE — SUBJECTIVE & OBJECTIVE
KEVEN. Stable on exam. Required some PRN for SBP in 170s otherwise no issues.     Medications:  Continuous Infusions:    Scheduled Meds:   sodium chloride 0.9%   Intravenous Once    sodium chloride 0.9%   Intravenous Once    bisacodyl  10 mg Rectal Daily    calcitriol  1 mcg Oral BID    calcium carbonate  2,000 mg Oral TID    carvedilol  25 mg Oral BID    cloNIDine  0.3 mg Oral Q8H    famotidine  20 mg Oral Daily    hydrALAZINE  100 mg Oral Q8H    irbesartan  300 mg Oral Daily    lacosamide  50 mg Oral BID    levETIRAcetam  500 mg Oral BID    polyethylene glycol  17 g Oral BID    senna-docusate 8.6-50 mg  2 tablet Oral Daily    sodium chloride  2 g Oral BID    tacrolimus  3 mg Oral Daily    tacrolimus  3 mg Oral Daily    verapamil  240 mg Oral Daily     PRN Meds:sodium chloride, dextrose 50%, dextrose 50%, glucagon (human recombinant), glucose, glucose, hydrALAZINE, insulin aspart U-100, labetalol, ondansetron, oxyCODONE, sodium chloride 0.9%     Review of Systems     Unable to aphasia   Objective:     Weight: 54.8 kg (120 lb 13 oz)  Body mass index is 22.1 kg/m².  Vital Signs (Most Recent):  Temp: 98.5 °F (36.9 °C) (05/03/19 0705)  Pulse: 78 (05/03/19 1005)  Resp: 20 (05/03/19 1005)  BP: (!) 141/97 (05/03/19 1005)  SpO2: 98 % (05/03/19 1005) Vital Signs (24h Range):  Temp:  [98.2 °F (36.8 °C)-98.5 °F (36.9 °C)] 98.5 °F (36.9 °C)  Pulse:  [74-80] 78  Resp:  [15-32] 20  SpO2:  [96 %-100 %] 98 %  BP: (141-177)/() 141/97     Date 05/03/19 0700 - 05/04/19 0659   Shift 5039-1774 4494-7222 4466-1230 24 Hour Total   INTAKE   P.O. 100   100   Blood 47.8   47.8   Shift Total(mL/kg) 147.8(2.7)   147.8(2.7)   OUTPUT   Shift Total(mL/kg)       Weight (kg) 54.8 54.8 54.8 54.8                        Hemodialysis AV Fistula Left forearm (Active)   Needle Size 15ga 4/25/2019  1:20 PM   Site Assessment Clean;Dry;Intact 4/25/2019  1:20 PM   Patency Present;Thrill;Bruit 4/25/2019  1:20 PM   Status Accessed  4/25/2019  1:20 PM   Flows Good 4/25/2019  3:05 AM   Dressing Intervention Dressing reinforced 4/25/2019  3:05 AM   Dressing Status Clean;Intact;Dry 4/25/2019 11:01 AM   Site Condition No complications 4/25/2019 11:01 AM   Dressing Occlusive 4/25/2019 11:01 AM   Drainage Description Other (Comment) 4/14/2018  5:26 PM       Neurosurgery Physical Exam  [id number]  AAO1 name  PERRLA   Partial transcortical motor aphasia improving   Move L side and RLE AG, RUE paresis with some effort against gravity   Wound c/d/i        Significant Labs:  Recent Labs   Lab 05/02/19  0156 05/02/19  0843 05/03/19  0300   * 152* 124*  124*    138 138  138   K 4.2 4.4 4.9  4.9    104 105  105   CO2 24 23 24  24   BUN 42* 42* 49*  49*   CREATININE 5.8* 6.3* 7.1*  7.1*   CALCIUM 6.6* 6.5* 7.0*  7.0*     Recent Labs   Lab 05/02/19  0156 05/03/19  0300   WBC 5.16 3.88*   HGB 9.2* 8.3*   HCT 28.3* 26.3*   PLT 78* 77*     Recent Labs   Lab 05/02/19  0156 05/03/19  0300   INR 1.3* 1.2   APTT 27.1  --      Microbiology Results (last 7 days)     ** No results found for the last 168 hours. **        All pertinent labs from the last 24 hours have been reviewed.    Significant Diagnostics:  CT: Ct Head Without Contrast    Result Date: 4/25/2019  Evolving operative change from left temporal craniectomy and cranioplasty for left posterior temporal parenchymal hematoma evacuation. There is reduced postoperative pneumocephalus with small volumes of increased parenchymal hemorrhage within and about the resection cavity. Evolving mass effect and edema with continued 5-6 mm of rightward midline shift similar to prior. There is continued small volume intraventricular hemorrhage with slight increase distension of the lateral ventricles concerning for component of evolving hydrocephalus. Continued diffuse effacement cerebral sulci at the vertex concerning for sequela of cerebral edema or evolving hydrocephalus Electronically signed  by: Reggie Espinoza DO Date:    04/25/2019 Time:    15:50

## 2019-05-03 NOTE — PROGRESS NOTES
[facility]  Physical Medicine & Rehab  Progress Note    Patient Name: Holly Patel  MRN: [id number]  Admission Date: 4/22/2019  Length of Stay: 11 days  Attending Physician: Jaciel Roblero MD    Subjective:     Principal Problem:Nontraumatic subcortical hemorrhage of left cerebral hemisphere    Hospital Course:   04/26/2019: Participated with OT.  Bed mobility CGA-Maddy.  Sit to stand and transfers Maddy.  Ambulated  4 steps Maddy with mild dizziness.  UBD/grooming Maddy and Feeding CGA.  04/29/2019: Participated with PT.  Bed mobility Maddy.  Sit to stand and transfers Maddy.  Ambulated 5 steps Maddy. Passed bedside swallow evaluation.  SLP recommending dental soft diet and thin liquids. SLP diagnosis of Aphasia and Cognitive-Linguistic Impairment.   04/30/2019: Participated with therapy.  Bed mobility CGA-Maddy.  Sit to stand CGA and transfers Maddy.  Ambulated 12 ft and 12 ft Maddy-CGA. No grooming.   5/1/19: Participated w/ OT. Static standing CGA. Endurance exercises x 5 sit to stand CGA. Declined adls.   05/02/2019: Participated with therapy.  Bed mobility Maddy.  Sit to stand Maddy.  Ambulated 20 ft x 2 trials Maddy & RW.      Interval History 5/3/2019:  Patient is seen for follow-up rehab evaluation and recommendations:   Awake and alert today. Participating with therapy.     HPI, Past Medical, Family, and Social History remains the same as documented in the initial encounter.    Scheduled Medications:    sodium chloride 0.9%   Intravenous Once    sodium chloride 0.9%   Intravenous Once    bisacodyl  10 mg Rectal Daily    calcitriol  1 mcg Oral BID    calcium carbonate  2,000 mg Oral TID    carvedilol  25 mg Oral BID    cloNIDine  0.3 mg Oral Q8H    famotidine  20 mg Oral Daily    hydrALAZINE  100 mg Oral Q8H    irbesartan  300 mg Oral Daily    lacosamide  50 mg Oral BID    levETIRAcetam  500 mg Oral BID    polyethylene glycol  17 g Oral BID    senna-docusate 8.6-50 mg  2 tablet  Oral Daily    sodium chloride  2 g Oral BID    tacrolimus  3 mg Oral Daily    tacrolimus  3 mg Oral Daily    verapamil  240 mg Oral Daily       Diagnostic Results: Labs: Reviewed    PRN Medications: sodium chloride, dextrose 50%, dextrose 50%, glucagon (human recombinant), glucose, glucose, hydrALAZINE, insulin aspart U-100, labetalol, ondansetron, oxyCODONE, sodium chloride 0.9%    Review of Systems   Constitutional: Positive for activity change.   HENT: Positive for trouble swallowing.    Respiratory: Negative for cough and shortness of breath.    Cardiovascular: Negative for chest pain and palpitations.   Musculoskeletal: Positive for gait problem.   Neurological: Positive for speech difficulty and weakness.   Psychiatric/Behavioral: Positive for confusion. Negative for agitation.     Objective:     Vital Signs (Most Recent):  Temp: 98.5 °F (36.9 °C) (05/03/19 0705)  Pulse: 78 (05/03/19 1005)  Resp: 20 (05/03/19 1005)  BP: (!) 141/97 (05/03/19 1005)  SpO2: 98 % (05/03/19 1005)    Vital Signs (24h Range):  Temp:  [98.2 °F (36.8 °C)-98.5 °F (36.9 °C)] 98.5 °F (36.9 °C)  Pulse:  [74-80] 78  Resp:  [15-32] 20  SpO2:  [96 %-100 %] 98 %  BP: (141-177)/() 141/97     Physical Exam   Constitutional: She appears well-developed and well-nourished.   HENT:   Head: Normocephalic and atraumatic.   Neck: Neck supple.   Pulmonary/Chest: Effort normal. No respiratory distress.   Neurological: She is disoriented. She exhibits abnormal muscle tone.   - Alert in bed  - Following commands  - Decreased muscle strength noted    Skin: Skin is warm and dry.   Psychiatric: Her behavior is normal. Cognition and memory are impaired.   Nursing note and vitals reviewed.    Assessment/Plan:      * Nontraumatic subcortical hemorrhage of left cerebral hemisphere  -S/p urgent left crani for clot evacuation  -NSGY following    See hospital course for functional, cognitive/speech/language, and nutrition/swallow status.       Recommendations  -  Encourage mobility, OOB in chair at least 3 hours per day, and early ambulation as appropriate   -  PT/OT evaluate and treat  -  SLP speech and cognitive evaluate and treat  -  Monitor sleep disturbances and establish consistent sleep-wake cycle  -  Monitor for bowel and bladder dysfunction  -  Monitor for shoulder pain, subluxation, & spasticity  -  Monitor for and prevent skin breakdown and pressure ulcers  · Early mobility, repositioning/weight shifting every 20-30 minutes when sitting, turn patient every 2 hours, proper mattress/overlay and chair cushioning, pressure relief/heel protector boots  -  DVT prophylaxis (if appropriate)  -  Reviewed discharge options (IP rehab, SNF, HH therapy, and OP therapy)    Hypocalcemia  -nephrology managing     Renovascular hypertension  - cardene gtt d/c'd    ESRD on hemodialysis  -nephrology following  -CRRT in room 5/1    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       Odessa Murrell NP  Department of Physical Medicine & Rehab   [facility]

## 2019-05-03 NOTE — PLAN OF CARE
Problem: Occupational Therapy Goal  Goal: Occupational Therapy Goal  Goals to be met by: 5/10(2 weeks from initial evaluation)     Patient will increase functional independence with ADLs by performing:    Pt will follow 95% of simple step commands for functional tasks.   Pt will verbally ID 1/3 items for ADL task with added time.   UE Dressing with Set-up Assistance and Supervision.  LE Dressing (pants, brief) with Set-up Assistance and Minimal Assistance.  Grooming while standing with Set-up Assistance and Contact Guard Assistance.  Toileting from toilet with Minimal Assistance for hygiene and clothing management. Met  *revised: with set up and supervision  Toilet transfer to toilet with Minimal Assistance. Met  *revised: with CGA  Functional mobility at short household distance for ADL task with Minimal Assistance. Met  *revised: with CGA      Outcome: Ongoing (interventions implemented as appropriate)  Goals remain appropriate. Cont to be limited by fatigue for functional tasks/activities. RENA Horta 5/3/2019

## 2019-05-03 NOTE — PT/OT/SLP PROGRESS
"Speech Language Pathology Treatment    Patient Name:  Holly Patel   MRN:  [id number]  Admitting Diagnosis: Nontraumatic subcortical hemorrhage of left cerebral hemisphere    Recommendations:                 General Recommendations:  Speech/language therapy  Diet recommendations:  Dental Soft, Liquid Diet Level: Thin   Aspiration Precautions: 1 bite/sip at a time, Alternating bites/sips, Avoid talking while eating, Eliminate distractions, Feed only when awake/alert, HOB to 90 degrees, Monitor for s/s of aspiration, Small bites/sips and Standard aspiration precautions   General Precautions: Standard, aphasia, aspiration, fall, seizure, dental soft  Communication strategies:  go to room if call light pushed; pt with aphasia    Subjective     "5/30/19." pt incorrectly read date on white board when asked to recall the current month.    Pain/Comfort:  · Pain Rating 1: 0/10    Objective:     Has the patient been evaluated by SLP for swallowing?   Yes  Keep patient NPO? No   Current Respiratory Status: room air      Pt was oriented to hospital given binary choices, but [facility] despite max cues.  Pt was unable to orient to month and  despite reference to external aid.  Pt answered 3-unit yes/no q's with 70% accuracy ind'ly.  Single word responsive naming q's recalling people/occupations were answered with 20% accuracy indly/40% given cues.     Assessment:     Holly Patel is a 28 y.o. female with an SLP diagnosis of Aphasia and Cognitive-Linguistic Impairment.      Goals:   Multidisciplinary Problems     SLP Goals        Problem: SLP Goal    Goal Priority Disciplines Outcome   SLP Goal     SLP Ongoing (interventions implemented as appropriate)   Description:  Revised goals to be met 5/9  1. Pt will tolerate diet of thin liquids and dental soft solids without overt clinical signs of aspiration   2. Pt will participate in trials of regular solids within speech therapy sessions to help determine least " restrictive diet   3. Pt will demonstrate orientation to place, situation , family members, date w/ mod-max cues   4. Pt will generate 2 items per category w/ mod cues to enhance organizations skills   5. Pt will label items w/ 60%acc w/ mod cues to enhance verbal expression skills   6. Pt will participate in ongoing assessment of speech language and cognitive linguistic skills to help rule out deficits and determine therapeutic plan of care        Goals to be met 5/2    1. Pt will tolerate diet of thin liquids and dental soft solids without overt clinical signs of aspiration   2. Pt will participate in trials of regular solids within speech therapy sessions to help determine least restrictive diet   3. Pt will demonstrate orientation to place, situation , family members, date w/ min cues. Goal not met/ongoing  4. Pt will complete category inclusions task w/ 80% acc w/ min cues. Goal not met  5. Pt will generate 3 items per category w/ min cues to enhance organizations skills. Goal not met  6. Pt will label items w/ 80%acc w/ mincues to enhance verbal expression skills. Goal not met  7. Pt will participate in ongoing assessment of speech language and cognitive linguistic skills to help rule out deficits and determine therapeutic plan of care. ongoing                       Plan:     · Patient to be seen:  4 x/week   · Plan of Care expires:     · Plan of Care reviewed with:  patient, mother   · SLP Follow-Up:  Yes       Discharge recommendations:  rehabilitation facility     Time Tracking:     SLP Treatment Date:   05/03/19  Speech Start Time:  1411  Speech Stop Time:  1428     Speech Total Time (min):  17 min    Billable Minutes: Speech Therapy Individual 17    ROM Fritz, FRANCOIS-SLP  05/03/2019     ROM Fritz, CCC-SLP  Speech Language Pathologist  [phone]  5/3/2019

## 2019-05-03 NOTE — PLAN OF CARE
Problem: SLP Goal  Goal: SLP Goal  Revised goals to be met 5/9  1. Pt will tolerate diet of thin liquids and dental soft solids without overt clinical signs of aspiration   2. Pt will participate in trials of regular solids within speech therapy sessions to help determine least restrictive diet   3. Pt will demonstrate orientation to place, situation , family members, date w/ mod-max cues   4. Pt will generate 2 items per category w/ mod cues to enhance organizations skills   5. Pt will label items w/ 60%acc w/ mod cues to enhance verbal expression skills   6. Pt will participate in ongoing assessment of speech language and cognitive linguistic skills to help rule out deficits and determine therapeutic plan of care        Goals to be met 5/2    1. Pt will tolerate diet of thin liquids and dental soft solids without overt clinical signs of aspiration   2. Pt will participate in trials of regular solids within speech therapy sessions to help determine least restrictive diet   3. Pt will demonstrate orientation to place, situation , family members, date w/ min cues. Goal not met/ongoing  4. Pt will complete category inclusions task w/ 80% acc w/ min cues. Goal not met  5. Pt will generate 3 items per category w/ min cues to enhance organizations skills. Goal not met  6. Pt will label items w/ 80%acc w/ mincues to enhance verbal expression skills. Goal not met  7. Pt will participate in ongoing assessment of speech language and cognitive linguistic skills to help rule out deficits and determine therapeutic plan of care. ongoing      Outcome: Ongoing (interventions implemented as appropriate)  Cont POC.   ROM Fritz, CCC-SLP  Speech Language Pathologist  [phone]  5/3/2019

## 2019-05-03 NOTE — PLAN OF CARE
Problem: Adult Inpatient Plan of Care  Goal: Plan of Care Review  Outcome: Ongoing (interventions implemented as appropriate)  POC reviewed with pt and mother at 1400. Pts mother verbalized understanding. Questions and concerns addressed. No acute events today. Pt progressing toward goals. Awaiting HD. Will continue to monitor. See flowsheets for full assessment and VS info.

## 2019-05-03 NOTE — PROGRESS NOTES
[facility]  Neurosurgery  Progress Note    Subjective:       Post-Op Info:  Procedure(s) (LRB):  CRANIOTOMY-- left crani for clot evac with microscrope (Left)   11 Days Post-Op     NAEON. Stable on exam. Required some PRN for SBP in 170s otherwise no issues.     Medications:  Continuous Infusions:    Scheduled Meds:   sodium chloride 0.9%   Intravenous Once    sodium chloride 0.9%   Intravenous Once    bisacodyl  10 mg Rectal Daily    calcitriol  1 mcg Oral BID    calcium carbonate  2,000 mg Oral TID    carvedilol  25 mg Oral BID    cloNIDine  0.3 mg Oral Q8H    famotidine  20 mg Oral Daily    hydrALAZINE  100 mg Oral Q8H    irbesartan  300 mg Oral Daily    lacosamide  50 mg Oral BID    levETIRAcetam  500 mg Oral BID    polyethylene glycol  17 g Oral BID    senna-docusate 8.6-50 mg  2 tablet Oral Daily    sodium chloride  2 g Oral BID    tacrolimus  3 mg Oral Daily    tacrolimus  3 mg Oral Daily    verapamil  240 mg Oral Daily     PRN Meds:sodium chloride, dextrose 50%, dextrose 50%, glucagon (human recombinant), glucose, glucose, hydrALAZINE, insulin aspart U-100, labetalol, ondansetron, oxyCODONE, sodium chloride 0.9%     Review of Systems     Unable to aphasia   Objective:     Weight: 54.8 kg (120 lb 13 oz)  Body mass index is 22.1 kg/m².  Vital Signs (Most Recent):  Temp: 98.5 °F (36.9 °C) (05/03/19 0705)  Pulse: 78 (05/03/19 1005)  Resp: 20 (05/03/19 1005)  BP: (!) 141/97 (05/03/19 1005)  SpO2: 98 % (05/03/19 1005) Vital Signs (24h Range):  Temp:  [98.2 °F (36.8 °C)-98.5 °F (36.9 °C)] 98.5 °F (36.9 °C)  Pulse:  [74-80] 78  Resp:  [15-32] 20  SpO2:  [96 %-100 %] 98 %  BP: (141-177)/() 141/97     Date 05/03/19 0700 - 05/04/19 0659   Shift 9252-8453 9137-1393 [id number] 24 Hour Total   INTAKE   P.O. 100   100   Blood 47.8   47.8   Shift Total(mL/kg) 147.8(2.7)   147.8(2.7)   OUTPUT   Shift Total(mL/kg)       Weight (kg) 54.8 54.8 54.8 54.8                         Hemodialysis AV Fistula Left forearm (Active)   Needle Size 15ga 4/25/2019  1:20 PM   Site Assessment Clean;Dry;Intact 4/25/2019  1:20 PM   Patency Present;Thrill;Bruit 4/25/2019  1:20 PM   Status Accessed 4/25/2019  1:20 PM   Flows Good 4/25/2019  3:05 AM   Dressing Intervention Dressing reinforced 4/25/2019  3:05 AM   Dressing Status Clean;Intact;Dry 4/25/2019 11:01 AM   Site Condition No complications 4/25/2019 11:01 AM   Dressing Occlusive 4/25/2019 11:01 AM   Drainage Description Other (Comment) 4/14/2018  5:26 PM       Neurosurgery Physical Exam  [id number]  AAO1 name  PERRLA   Partial transcortical motor aphasia improving   Move L side and RLE AG, RUE paresis with some effort against gravity   Wound c/d/i        Significant Labs:  Recent Labs   Lab 05/02/19  0156 05/02/19  0843 05/03/19  0300   * 152* 124*  124*    138 138  138   K 4.2 4.4 4.9  4.9    104 105  105   CO2 24 23 24  24   BUN 42* 42* 49*  49*   CREATININE 5.8* 6.3* 7.1*  7.1*   CALCIUM 6.6* 6.5* 7.0*  7.0*     Recent Labs   Lab 05/02/19  0156 05/03/19  0300   WBC 5.16 3.88*   HGB 9.2* 8.3*   HCT 28.3* 26.3*   PLT 78* 77*     Recent Labs   Lab 05/02/19  0156 05/03/19  0300   INR 1.3* 1.2   APTT 27.1  --      Microbiology Results (last 7 days)     ** No results found for the last 168 hours. **        All pertinent labs from the last 24 hours have been reviewed.    Significant Diagnostics:  CT: Ct Head Without Contrast    Result Date: 4/25/2019  Evolving operative change from left temporal craniectomy and cranioplasty for left posterior temporal parenchymal hematoma evacuation. There is reduced postoperative pneumocephalus with small volumes of increased parenchymal hemorrhage within and about the resection cavity. Evolving mass effect and edema with continued 5-6 mm of rightward midline shift similar to prior. There is continued small volume intraventricular hemorrhage with slight increase distension of the lateral ventricles  concerning for component of evolving hydrocephalus. 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 Follow up scan with more punctuate ICH and perilesional edema as well as IV after severe hypertensive episode.      No acute events overnight. Pt with improving aphasia.     --Continue care per primary team.  --Continue levetiracetam 500 bid.  --Continue lacosamide 50 bid.  --Continue hourly neuromonitoring.  --Continue strict blood pressure control.  --HD per nephrology   --Daily PT/OT  --Pt cleared from neurosurgical perspective for stepdown to floor under hospital medicine service when deemed appropriate by primary team.   --We will continue to monitor closely, please contact us with any questions or concerns.            Kimberly Roy MD  Neurosurgery  [facility]

## 2019-05-03 NOTE — PROGRESS NOTES
[facility]  Nephrology  Progress Note    Patient Name: Holly Patel  MRN: [id number]  Admission Date: 4/22/2019  Hospital Length of Stay: 11 days  Attending Provider: Jaciel Roblero MD   Primary Care Physician: Stan Sosa MD  Principal Problem:Nontraumatic subcortical hemorrhage of left cerebral hemisphere    Subjective:     Interval History: NAEON. Patient net positive 1.1 L/24 hr. CoCa improving slowly, now 8.3 today.     Review of patient's allergies indicates:   Allergen Reactions    Chloral hydrate Hallucinations     Other reaction(s): Hallucinations  Other reaction(s): Hives    Hydrocodone Other (See Comments)     Mental status changes    Tolerates oxycodone     Current Facility-Administered Medications   Medication Frequency    0.9%  NaCl infusion (for blood administration) Q24H PRN    0.9%  NaCl infusion (for blood administration) Q24H PRN    0.9%  NaCl infusion Once    0.9%  NaCl infusion Once    bisacodyl suppository 10 mg Daily    calcitriol solution 1 mcg BID    calcium carbonate 500 mg/5 mL (1,250 mg/5 mL) suspension 2,000 mg TID    carvedilol tablet 25 mg BID    cloNIDine tablet 0.3 mg Q8H    dextrose 50% injection 12.5 g PRN    dextrose 50% injection 25 g PRN    famotidine tablet 20 mg Daily    glucagon (human recombinant) injection 1 mg PRN    glucose chewable tablet 16 g PRN    glucose chewable tablet 24 g PRN    hydrALAZINE injection 10 mg Q4H PRN    hydrALAZINE tablet 100 mg Q8H    insulin aspart U-100 pen 1-10 Units QID (AC + HS) PRN    irbesartan tablet 300 mg Daily    labetalol 20 mg/4 mL (5 mg/mL) IV syring Q4H PRN    lacosamide tablet 50 mg BID    levETIRAcetam tablet 500 mg BID    niCARdipine 40 mg/200 mL infusion Continuous    ondansetron disintegrating tablet 4 mg Q6H PRN    oxyCODONE immediate release tablet 5 mg Q6H PRN    polyethylene glycol packet 17 g BID    senna-docusate 8.6-50 mg per tablet 2 tablet Daily    sodium  chloride 0.9% flush 10 mL PRN    sodium chloride tablet 2 g BID    tacrolimus (PROGRAF) 1 mg/mL oral syringe Daily    tacrolimus (PROGRAF) 1 mg/mL oral syringe Daily    verapamil CR tablet 240 mg Daily       Objective:     Vital Signs (Most Recent):  Temp: 98.5 °F (36.9 °C) (05/03/19 0705)  Pulse: 77 (05/03/19 0805)  Resp: (!) 29 (05/03/19 0805)  BP: (!) 154/103 (05/03/19 0805)  SpO2: 99 % (05/03/19 0805)  O2 Device (Oxygen Therapy): room air (05/03/19 0805) Vital Signs (24h Range):  Temp:  [98.2 °F (36.8 °C)-98.5 °F (36.9 °C)] 98.5 °F (36.9 °C)  Pulse:  [74-82] 77  Resp:  [10-32] 29  SpO2:  [96 %-100 %] 99 %  BP: (149-177)/() 154/103     Weight: 54.8 kg (120 lb 13 oz) (05/02/19 0900)  Body mass index is 22.1 kg/m².  Body surface area is 1.55 meters squared.    I/O last 3 completed shifts:  In: 2149.8 [P.O.:1800; Blood:249.8; IV Piggyback:100]  Out: -     Physical Exam   Constitutional: She is oriented to person, place, and time. She appears well-developed. She is sleeping. She is easily aroused. No distress.   HENT:   Right Ear: External ear normal.   Left Ear: External ear normal.   Eyes: Conjunctivae and EOM are normal. Right eye exhibits no discharge. Left eye exhibits no discharge.   Neck: Normal range of motion. Neck supple.   Cardiovascular: Normal rate and regular rhythm. Exam reveals no gallop and no friction rub.   No murmur heard.  Pulmonary/Chest: Effort normal and breath sounds normal. No respiratory distress. She has no wheezes. She has no rales.   Abdominal: Soft. Bowel sounds are normal. She exhibits no distension. There is no tenderness.   Musculoskeletal: Normal range of motion. She exhibits no edema or deformity.   Neurological: She is oriented to person, place, and time and easily aroused.   Skin: Skin is warm and dry. She is not diaphoretic.   Psychiatric: She has a normal mood and affect. Her behavior is normal.     Significant Labs:  CBC:   Recent Labs   Lab 05/03/19  0300   WBC  3.88*   RBC 3.08*   HGB 8.3*   HCT 26.3*   PLT 77*   MCV 85   MCH 26.9*   MCHC 31.6*     CMP:   Recent Labs   Lab 05/03/19  0300   *  124*   CALCIUM 7.0*  7.0*   ALBUMIN 2.3*   PROT 5.7*     138   K 4.9  4.9   CO2 24  24     105   BUN 49*  49*   CREATININE 7.1*  7.1*   ALKPHOS 353*   ALT 8*   AST 21   BILITOT 0.5            Assessment/Plan:     * Nontraumatic subcortical hemorrhage of left cerebral hemisphere  - Being follow by admitting service     Hypocalcemia  2/2 non-compliance with activated vit D and calcium supplementation s/p parathyroidectomy  -PTH elevated @ 407  -phos levels daily  -continue calcitriol 1 mcg po BID  -continue Calcium Carbonate 2 g TID   -HD today, will adjust Ca bath       ESRD on hemodialysis  ESRD on iHD TTS  [facility]  Dr. Bass  3.5 hours  EDW ~ 50 kg  LFA AVF    Plan/Recommendations:  -Will provide dialysis today for volume management and metabolic clearance  -Target UF 2-3 L as tolerated, keep MAP >65  -HD will help with volume removal and HTN management  -continue strict I/O's  -daily renal panel with phos added      Case discussed with staff further recs with attestation.     I will follow-up with patient. Please contact us if you have any additional questions.    TED Russell DNP, APRN, FNP-C  Department of Nephrology  [facility]  Pager: [phone]

## 2019-05-03 NOTE — PLAN OF CARE
Problem: Adult Inpatient Plan of Care  Goal: Plan of Care Review  Outcome: Ongoing (interventions implemented as appropriate)  POC reviewed with pt at 0500. Pt verbalized understanding. Questions and concerns addressed. No acute events overnight. Hydralazine and Lobetolol PRN given x3 for syst >160. No BM. Tmax 98.5F. 1 unit of plts being administered. Pt progressing toward goals. Will continue to monitor. See flowsheets for full assessment and VS info

## 2019-05-03 NOTE — TREATMENT PLAN
Treatment Plan  05/03/2019  6:08 PM    Chart reviewed and case discussed with attending.     We are following Ms. Granadosnes for IS and recommendations are:  --Daily CMP, CBC, and INR   --Daily Tacrolimus level    --We will switch Tacrolimus to oral capsule and increase in the following manner:   -----Tacrolimus 4 mg PO tonight  -----Then starting tomorrow she will be on Tacrolimus 4 mg PO in the AM and 3 mg PO in the PM    --We will continue to follow alongside primary team (please promptly notify us of discharge in order to arrange for appropriate outpatient follow up).    Elinor Medeiros M.D.  Gastroenterology Fellow, PGY-V  Pager: [phone]  [facility]

## 2019-05-03 NOTE — ASSESSMENT & PLAN NOTE
S/p urgent left crani for clot evacuation    - Continue to monitor neurochecks q1 hr  - D/Fabian HTS 2%, starting Na tablets 2 gr q12 hr  - target Na 140, monitor BMP q12 hr  - Continue levetiracetam 500 mg q12  - Continue Lacosamide 50 mg BID  - Pt cleared from neurosurgical perspective for stepdown to floor under hospital medicine service when deemed appropriate by primary team.  - Target plt >50K w/o active bleeding  - Plt 77 this AM   - No SQ heparin

## 2019-05-03 NOTE — ASSESSMENT & PLAN NOTE
ESRD on iHD TTS  [facility]  Dr. Bass  3.5 hours  EDW ~ 50 kg  LFA AVF    Plan/Recommendations:  -Will provide dialysis today for volume management and metabolic clearance  -Target UF 2-3 L as tolerated, keep MAP >65  -HD will help with volume removal and HTN management  -continue strict I/O's  -daily renal panel with phos added

## 2019-05-03 NOTE — SUBJECTIVE & OBJECTIVE
Interval History 5/3/2019:  Patient is seen for follow-up rehab evaluation and recommendations:   Awake and alert today. Participating with therapy.     HPI, Past Medical, Family, and Social History remains the same as documented in the initial encounter.    Scheduled Medications:    sodium chloride 0.9%   Intravenous Once    sodium chloride 0.9%   Intravenous Once    bisacodyl  10 mg Rectal Daily    calcitriol  1 mcg Oral BID    calcium carbonate  2,000 mg Oral TID    carvedilol  25 mg Oral BID    cloNIDine  0.3 mg Oral Q8H    famotidine  20 mg Oral Daily    hydrALAZINE  100 mg Oral Q8H    irbesartan  300 mg Oral Daily    lacosamide  50 mg Oral BID    levETIRAcetam  500 mg Oral BID    polyethylene glycol  17 g Oral BID    senna-docusate 8.6-50 mg  2 tablet Oral Daily    sodium chloride  2 g Oral BID    tacrolimus  3 mg Oral Daily    tacrolimus  3 mg Oral Daily    verapamil  240 mg Oral Daily       Diagnostic Results: Labs: Reviewed    PRN Medications: sodium chloride, dextrose 50%, dextrose 50%, glucagon (human recombinant), glucose, glucose, hydrALAZINE, insulin aspart U-100, labetalol, ondansetron, oxyCODONE, sodium chloride 0.9%    Review of Systems   Constitutional: Positive for activity change.   HENT: Positive for trouble swallowing.    Respiratory: Negative for cough and shortness of breath.    Cardiovascular: Negative for chest pain and palpitations.   Musculoskeletal: Positive for gait problem.   Neurological: Positive for speech difficulty and weakness.   Psychiatric/Behavioral: Positive for confusion. Negative for agitation.     Objective:     Vital Signs (Most Recent):  Temp: 98.5 °F (36.9 °C) (05/03/19 0705)  Pulse: 78 (05/03/19 1005)  Resp: 20 (05/03/19 1005)  BP: (!) 141/97 (05/03/19 1005)  SpO2: 98 % (05/03/19 1005)    Vital Signs (24h Range):  Temp:  [98.2 °F (36.8 °C)-98.5 °F (36.9 °C)] 98.5 °F (36.9 °C)  Pulse:  [74-80] 78  Resp:  [15-32] 20  SpO2:  [96 %-100 %] 98 %  BP:  (141-177)/() 141/97     Physical Exam   Constitutional: She appears well-developed and well-nourished.   HENT:   Head: Normocephalic and atraumatic.   Neck: Neck supple.   Pulmonary/Chest: Effort normal. No respiratory distress.   Neurological: She is disoriented. She exhibits abnormal muscle tone.   - Alert in bed  - Following commands  - Decreased muscle strength noted    Skin: Skin is warm and dry.   Psychiatric: Her behavior is normal. Cognition and memory are impaired.   Nursing note and vitals reviewed.

## 2019-05-03 NOTE — PROGRESS NOTES
[facility]  Neurocritical Care  Progress Note    Admit Date: 4/22/2019  Service Date: 05/03/2019  Length of Stay: 11    Subjective:     Chief Complaint: Nontraumatic subcortical hemorrhage of left cerebral hemisphere    History of Present Illness: 29 yo F w/ PMH significant for liver transplant in 1991, recent thyroidectomy on 3/27/2019, ESRD on HD (MWF), and epilepsy who presents to [facility] for higher level of care following planned surgical excision of L calvarial lesion s/p excision and cranioplasty 4/22/19. 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 At that time she shared that she continued to have a headache, noting the pain was usually over the left temporal aspect of her head but migrated over to the right temporal aspect. She states the pain on the right is exacerbated with palpation of the area. She has no additional symptomatic complaints at this time. Currently stable following surgery. Denies acute pain, otherwise comfortable w/ non-focal neurological exam.         Hospital Course: 4/22: Admitted to [facility]. Initiated cardene gtt to maintain SBP <140. Consulted hepatology and nephrology. Acute worsening of neuro exam. STAT CT revealed enlarging hematoma w/ midline shift. Urgently taken down to OR for evacuation. Return from OR intubated.   4/23: Neuro exam improved from previous. Somnolent but rousable. Following commands. Moving all extremities spontaneously; RUE 4/5. Pending CRRT vs HD today.  04/24/2019: no acute adverse events overnight  4/27: KUB, resume oral feeds, ionized Ca, HD over night successful, increase hydralazine, decrease HTS  4/28: D/C HTS, increase hydralazine, likely add clonidine today as well, wean cardene, suppository  4/29: slight improvement in neuro exam, following commands. Started on HTS 2%. Increased  clonidine and weaned off of cardene this AM, D/Fabian A-line. INR remains elevated. Shifted potassium. nephro with plans for slow HD.   4/30: neurologically unchanged. intermittently on cardene, increase clonidine to 0.3 TID, resuming home irbesartan. D/Fabian HTS 2%, started Na tablets. Transfused with plt x2 by NSGY.  05/01/2019 Patient remains aphasic,slightly worse than prior. No other focal neurologic deficits. Tolerating HD well. SBP goal <160, will attempt to wean off cardene. Awaiting Plt >100 to start heparin SQ. Will obtain a CT scan if any other neurological changes are observed.   05/03/2019 Patient w/ some improvement noted, particularly w/ word finding and naming of objects. HD planned for today. NSGY cleared for step down to Hospital Medicine.       Interval History:  Please see hospital course    Review of Systems   Constitutional: Negative for fever.   HENT: Negative for drooling and trouble swallowing.    Respiratory: Negative for choking and shortness of breath.    Gastrointestinal: Negative for nausea and vomiting.   Allergic/Immunologic: Positive for immunocompromised state.   Neurological: Positive for speech difficulty and weakness. Negative for seizures.   Psychiatric/Behavioral: Positive for confusion. Negative for agitation.       Objective:     Vitals:  Temp: 98.5 °F (36.9 °C)  Pulse: 75  Rhythm: (P) normal sinus rhythm  BP: (!) 158/106  MAP (mmHg): 127  Resp: (!) 23  SpO2: 98 %  O2 Device (Oxygen Therapy): room air    Temp  Min: 98.2 °F (36.8 °C)  Max: 98.5 °F (36.9 °C)  Pulse  Min: 74  Max: 80  BP  Min: 130/83  Max: 177/120  MAP (mmHg)  Min: 101  Max: 141  Resp  Min: 15  Max: 32  SpO2  Min: 96 %  Max: 100 %    05/02 0701 - 05/03 0700  In: 1150 [P.O.:1050]  Out: -    Unmeasured Output  Urine Occurrence: 0  Stool Occurrence: 0  Emesis Occurrence: 0       Physical Exam   Constitutional: She appears well-developed. She appears lethargic. She is cooperative. She appears ill. No distress.   HENT:    Head: Normocephalic.   Eyes: Pupils are equal, round, and reactive to light.   Neck: Normal range of motion.   Cardiovascular: Normal rate and regular rhythm.   Pulmonary/Chest: Effort normal. No tachypnea. No respiratory distress.   Abdominal: Soft. She exhibits no distension.   Musculoskeletal: Normal range of motion. She exhibits no edema.   Neurological: She appears lethargic. She displays no tremor. She displays no seizure activity.   Neurologically stable although remains aphasic, although with some noted improvements today   Improved naming and repetition as well as command following.   PERRL, brisk, EOM intact  Moves all 4 extremities       Skin: Skin is warm. She is not diaphoretic.   Psychiatric: She is slowed. She is inattentive.   Vitals reviewed.    Oriented to self and hospital today, but unable to state that she is at [facility]     Medications:  Continuous   Scheduled    sodium chloride 0.9%  Once   sodium chloride 0.9%  Once   bisacodyl 10 mg Daily   calcitriol 1 mcg BID   calcium carbonate 2,000 mg TID   carvedilol 25 mg BID   cloNIDine 0.3 mg Q8H   famotidine 20 mg Daily   hydrALAZINE 100 mg Q8H   irbesartan 300 mg Daily   lacosamide 50 mg BID   levETIRAcetam 500 mg BID   polyethylene glycol 17 g BID   senna-docusate 8.6-50 mg 2 tablet Daily   sodium chloride 2 g BID   [START ON 5/4/2019] tacrolimus 3 mg Daily   tacrolimus 4 mg Daily   [START ON 5/4/2019] tacrolimus 4 mg Daily   verapamil 240 mg Daily   PRN    sodium chloride  Q24H PRN   dextrose 50% 12.5 g PRN   dextrose 50% 25 g PRN   glucagon (human recombinant) 1 mg PRN   glucose 16 g PRN   glucose 24 g PRN   hydrALAZINE 10 mg Q4H PRN   insulin aspart U-100 1-10 Units QID (AC + HS) PRN   labetalol 10 mg Q4H PRN   ondansetron 4 mg Q6H PRN   oxyCODONE 5 mg Q6H PRN   sodium chloride 0.9% 10 mL PRN       Recent Labs   Lab 05/03/19  0300   WBC 3.88*   RBC 3.08*   HGB 8.3*   HCT 26.3*   PLT 77*   MCV 85   MCH 26.9*   MCHC 31.6*     Recent Labs   Lab  05/03/19  0300   CALCIUM 7.0*  7.0*   PROT 5.7*     138   K 4.9  4.9   CO2 24  24     105   BUN 49*  49*   CREATININE 7.1*  7.1*   ALKPHOS 353*   ALT 8*   AST 21   BILITOT 0.5       Diet  Diet renal [facility]; Dysphagia (Mechanical Soft)  Diet renal [facility]; Dysphagia (Mechanical Soft)        Assessment/Plan:     Neuro  * Nontraumatic subcortical hemorrhage of left cerebral hemisphere  S/p urgent left crani for clot evacuation    - Continue to monitor neurochecks q1 hr  - D/Fabian HTS 2%, starting Na tablets 2 gr q12 hr  - target Na 140, monitor BMP q12 hr  - Continue levetiracetam 500 mg q12  - Continue Lacosamide 50 mg BID  - Pt cleared from neurosurgical perspective for stepdown to floor under hospital medicine service when deemed appropriate by primary team.  - Target plt >50K w/o active bleeding  - Plt 77 this AM   - No SQ heparin      Seizures  -Continue home medications: Keppra 500 mg BID, Vimpat 50 mg BID    Renal/  Hypocalcemia  Ca 7.0  - Continue Calcitriol to 1 mcg q12 hr  - Continue Calcium carbonate 2000 mg q8 hr  - Nephrology following     ESRD on hemodialysis  ESRD w/ HD on MWF as outpatient  [facility]    - Nephrology following, appreciate their assistance  - HD today  (5/3)  - Monitor BMP q12 hr      Hematology  Thrombocytopenia  - Target plt>50K w/o active bleeding  - Plt 77 this AM  - No SQ heparin     GI  Liver replaced by transplant  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

## 2019-05-03 NOTE — ASSESSMENT & PLAN NOTE
-S/p urgent left crani for clot evacuation  -NSGY following    See hospital course for functional, cognitive/speech/language, and nutrition/swallow status.      Recommendations  -  Encourage mobility, OOB in chair at least 3 hours per day, and early ambulation as appropriate   -  PT/OT evaluate and treat  -  SLP speech and cognitive evaluate and treat  -  Monitor sleep disturbances and establish consistent sleep-wake cycle  -  Monitor for bowel and bladder dysfunction  -  Monitor for shoulder pain, subluxation, & spasticity  -  Monitor for and prevent skin breakdown and pressure ulcers  · Early mobility, repositioning/weight shifting every 20-30 minutes when sitting, turn patient every 2 hours, proper mattress/overlay and chair cushioning, pressure relief/heel protector boots  -  DVT prophylaxis (if appropriate)  -  Reviewed discharge options (IP rehab, SNF, HH therapy, and OP therapy)

## 2019-05-03 NOTE — ASSESSMENT & PLAN NOTE
2/2 non-compliance with activated vit D and calcium supplementation s/p parathyroidectomy  -PTH elevated @ 407  -phos levels daily  -continue calcitriol 1 mcg po BID  -continue Calcium Carbonate 2 g TID   -HD today, will adjust Ca bath

## 2019-05-03 NOTE — SUBJECTIVE & OBJECTIVE
Interval History:  Please see hospital course    Review of Systems   Constitutional: Negative for fever.   HENT: Negative for drooling and trouble swallowing.    Respiratory: Negative for choking and shortness of breath.    Gastrointestinal: Negative for nausea and vomiting.   Allergic/Immunologic: Positive for immunocompromised state.   Neurological: Positive for speech difficulty and weakness. Negative for seizures.   Psychiatric/Behavioral: Positive for confusion. Negative for agitation.       Objective:     Vitals:  Temp: 98.5 °F (36.9 °C)  Pulse: 75  Rhythm: (P) normal sinus rhythm  BP: (!) 158/106  MAP (mmHg): 127  Resp: (!) 23  SpO2: 98 %  O2 Device (Oxygen Therapy): room air    Temp  Min: 98.2 °F (36.8 °C)  Max: 98.5 °F (36.9 °C)  Pulse  Min: 74  Max: 80  BP  Min: 130/83  Max: 177/120  MAP (mmHg)  Min: 101  Max: 141  Resp  Min: 15  Max: 32  SpO2  Min: 96 %  Max: 100 %    05/02 0701 - 05/03 0700  In: 1150 [P.O.:1050]  Out: -    Unmeasured Output  Urine Occurrence: 0  Stool Occurrence: 0  Emesis Occurrence: 0       Physical Exam   Constitutional: She appears well-developed. She appears lethargic. She is cooperative. She appears ill. No distress.   HENT:   Head: Normocephalic.   Eyes: Pupils are equal, round, and reactive to light.   Neck: Normal range of motion.   Cardiovascular: Normal rate and regular rhythm.   Pulmonary/Chest: Effort normal. No tachypnea. No respiratory distress.   Abdominal: Soft. She exhibits no distension.   Musculoskeletal: Normal range of motion. She exhibits no edema.   Neurological: She appears lethargic. She displays no tremor. She displays no seizure activity.   Neurologically stable although remains aphasic, although with some noted improvements today   Improved naming and repetition as well as command following.   PERRL, brisk, EOM intact  Moves all 4 extremities       Skin: Skin is warm. She is not diaphoretic.   Psychiatric: She is slowed. She is inattentive.   Vitals  reviewed.    Oriented to self and hospital today, but unable to state that she is at [facility]     Medications:  Continuous   Scheduled    sodium chloride 0.9%  Once   sodium chloride 0.9%  Once   bisacodyl 10 mg Daily   calcitriol 1 mcg BID   calcium carbonate 2,000 mg TID   carvedilol 25 mg BID   cloNIDine 0.3 mg Q8H   famotidine 20 mg Daily   hydrALAZINE 100 mg Q8H   irbesartan 300 mg Daily   lacosamide 50 mg BID   levETIRAcetam 500 mg BID   polyethylene glycol 17 g BID   senna-docusate 8.6-50 mg 2 tablet Daily   sodium chloride 2 g BID   [START ON 5/4/2019] tacrolimus 3 mg Daily   tacrolimus 4 mg Daily   [START ON 5/4/2019] tacrolimus 4 mg Daily   verapamil 240 mg Daily   PRN    sodium chloride  Q24H PRN   dextrose 50% 12.5 g PRN   dextrose 50% 25 g PRN   glucagon (human recombinant) 1 mg PRN   glucose 16 g PRN   glucose 24 g PRN   hydrALAZINE 10 mg Q4H PRN   insulin aspart U-100 1-10 Units QID (AC + HS) PRN   labetalol 10 mg Q4H PRN   ondansetron 4 mg Q6H PRN   oxyCODONE 5 mg Q6H PRN   sodium chloride 0.9% 10 mL PRN       Recent Labs   Lab 05/03/19  0300   WBC 3.88*   RBC 3.08*   HGB 8.3*   HCT 26.3*   PLT 77*   MCV 85   MCH 26.9*   MCHC 31.6*     Recent Labs   Lab 05/03/19  0300   CALCIUM 7.0*  7.0*   PROT 5.7*     138   K 4.9  4.9   CO2 24  24     105   BUN 49*  49*   CREATININE 7.1*  7.1*   ALKPHOS 353*   ALT 8*   AST 21   BILITOT 0.5       Diet  Diet renal [facility]; Dysphagia (Mechanical Soft)  Diet renal [facility]; Dysphagia (Mechanical Soft)

## 2019-05-03 NOTE — ASSESSMENT & PLAN NOTE
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 Follow up scan with more punctuate ICH and perilesional edema as well as IV after severe hypertensive episode.      No acute events overnight. Pt with improving aphasia.     --Continue care per primary team.  --Continue levetiracetam 500 bid.  --Continue lacosamide 50 bid.  --Continue hourly neuromonitoring.  --Continue strict blood pressure control.  --HD per nephrology   --Daily PT/OT  --Pt cleared from neurosurgical perspective for stepdown to floor under hospital medicine service when deemed appropriate by primary team.   --We will continue to monitor closely, please contact us with any questions or concerns.

## 2019-05-03 NOTE — PT/OT/SLP PROGRESS
Occupational Therapy   Treatment    Name: Holly Patel  MRN: [id number]  Admitting Diagnosis:  Nontraumatic subcortical hemorrhage of left cerebral hemisphere  11 Days Post-Op    Recommendations:     Discharge Recommendations: rehabilitation facility  Discharge Equipment Recommendations:  none  Barriers to discharge:  Other (Comment)(safety risk; level of skilled assistance required; remains in ICU)    Assessment:     Holyl Patel is a 28 y.o. female with a medical diagnosis of Nontraumatic subcortical hemorrhage of left cerebral hemisphere.  She presents with overall decline in functional endurance for tasks/activities from baseline. Moreover, pt with aphasia and noted cognitive deficits. She would greatly benefit from nursing mobility protocol while in acute setting for assist in endurance mgmt (ie: up to chair for meals/to bathroom for toileting). Cont to rec rehab at this time for best functional outcomes and safety. Performance deficits affecting function are impaired endurance, impaired self care skills, impaired functional mobilty, gait instability, impaired balance, impaired cognition, impaired cardiopulmonary response to activity, other (comment)(aphasia).     Rehab Prognosis:  Good; patient would benefit from acute skilled OT services to address these deficits and reach maximum level of function.       Plan:     Patient to be seen 4 x/week to address the above listed problems via self-care/home management, therapeutic activities, therapeutic exercises, neuromuscular re-education, cognitive retraining  · Plan of Care Expires: 05/25/19  · Plan of Care Reviewed with: patient, mother    Subjective     Pain/Comfort:  · Pain Rating 1: 0/10  · Pain Rating Post-Intervention 1: 0/10    Objective:     Communicated with: RN prior to session. Pt's mother present throughout session. Patient found HOB elevated with blood pressure cuff, peripheral IV, telemetry, pulse ox (continuous) upon OT entry to  room.    General Precautions: Standard, aphasia, aspiration, fall, seizure, dental soft(renal)   Orthopedic Precautions:N/A   Braces: N/A     Occupational Performance:   Bed Mobility:    · Patient completed Supine to Sit with minimum assistance     Functional Mobility/Transfers:  · Patient completed Sit <> Stand Transfer with contact guard assistance  with  hand-held assist    · From EOB x1 trial  · From bedside chair x2 trials   · Functional Mobility: household distances x2 with CGA and unilateral hand held assistance     Activities of Daily Living:  · Grooming: minimum assistance sequencing while standing at sink  · Lower Body Dressing: minimum assistance donning B socks EOB; hand over hand for initiation of task with setup    [facility] 6 Click ADL: 18    Treatment & Education:  -Pt alert and agreeable to therapy session; reported orientation to person and place; perseverating with time (reported 28th); provided with verbal daily orientation  -OT to turn on light for session  -standing grooming x1.02 min before reported fatigue and request for seated; 2nd attempt for functional task 0.36 sec before reported fatigue and dizziness   -Pt able to state 2/2 children's names, 1/2 grades, 3/5 colors presented on 1st attempt, able to complete simple reading task and close and far range   -edu pt/mother on no OT over the weekend; encouraged pt/mother to request nursing assist for pt to be up to chair x3/d (meals) with need for reinforcement   -Communication board updated; questions/concerns addressed within OT scope of practice      Patient left up in chair with all lines intact, call button in reach, Rn notified and mother presentEducation:      GOALS:   Multidisciplinary Problems     Occupational Therapy Goals        Problem: Occupational Therapy Goal    Goal Priority Disciplines Outcome Interventions   Occupational Therapy Goal     OT, PT/OT Ongoing (interventions implemented as appropriate)    Description:  Goals to be met  by: 5/10(2 weeks from initial evaluation)     Patient will increase functional independence with ADLs by performing:    Pt will follow 95% of simple step commands for functional tasks.   Pt will verbally ID 1/3 items for ADL task with added time.   UE Dressing with Set-up Assistance and Supervision.  LE Dressing (pants, brief) with Set-up Assistance and Minimal Assistance.  Grooming while standing with Set-up Assistance and Contact Guard Assistance.  Toileting from toilet with Minimal Assistance for hygiene and clothing management. Met  *revised: with set up and supervision  Toilet transfer to toilet with Minimal Assistance. Met  *revised: with CGA  Functional mobility at short household distance for ADL task with Minimal Assistance. Met  *revised: with CGA                       Time Tracking:     OT Date of Treatment: 05/03/19  OT Start Time: 1050  OT Stop Time: 1108  OT Total Time (min): 18 min    Billable Minutes:Self Care/Home Management 18    RENA Horta  5/3/2019

## 2019-05-03 NOTE — ASSESSMENT & PLAN NOTE
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

## 2019-05-03 NOTE — ASSESSMENT & PLAN NOTE
ESRD w/ HD on MWF as outpatient  AVF in [facility]    - Nephrology following, appreciate their assistance  - HD today  (5/3)  - Monitor BMP q12 hr

## 2019-05-04 NOTE — ASSESSMENT & PLAN NOTE
Ca 8,3, corrected calcium  9.4  - Improving   - Continue Calcitriol to 1 mcg q12 hr  - Continue Calcium carbonate 2000 mg q8 hr  - Nephrology following, appreciate recs

## 2019-05-04 NOTE — ASSESSMENT & PLAN NOTE
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

## 2019-05-04 NOTE — PROGRESS NOTES
[facility]  Neurocritical Care  Progress Note    Admit Date: 4/22/2019  Service Date: 05/04/2019  Length of Stay: 12    Subjective:     Chief Complaint: Nontraumatic subcortical hemorrhage of left cerebral hemisphere    History of Present Illness: 27 yo F w/ PMH significant for liver transplant in 1991, recent thyroidectomy on 3/27/2019, ESRD on HD (MWF), and epilepsy who presents to [facility] for higher level of care following planned surgical excision of L calvarial lesion s/p excision and cranioplasty 4/22/19. The pt presented to [facility] on 03/12/2019 with a complaint of headaches, among other symptoms, and received a workup that included a CT head which showed a left temporal calvarium lesion with mass effect. At that time she shared that she continued to have a headache, noting the pain was usually over the left temporal aspect of her head but migrated over to the right temporal aspect. She states the pain on the right is exacerbated with palpation of the area. She has no additional symptomatic complaints at this time. Currently stable following surgery. Denies acute pain, otherwise comfortable w/ non-focal neurological exam.         Hospital Course: 4/22: Admitted to [facility]. Initiated cardene gtt to maintain SBP <140. Consulted hepatology and nephrology. Acute worsening of neuro exam. STAT CT revealed enlarging hematoma w/ midline shift. Urgently taken down to OR for evacuation. Return from OR intubated.   4/23: Neuro exam improved from previous. Somnolent but rousable. Following commands. Moving all extremities spontaneously; RUE 4/5. Pending CRRT vs HD today.  04/24/2019: no acute adverse events overnight  4/27: KUB, resume oral feeds, ionized Ca, HD over night successful, increase hydralazine, decrease HTS  4/28: D/C HTS, increase hydralazine, likely add clonidine today as well, wean cardene, suppository  4/29: slight improvement in neuro exam, following commands. Started on HTS 2%. Increased  clonidine and weaned off of cardene this AM, D/Fabian A-line. INR remains elevated. Shifted potassium. nephro with plans for slow HD.   4/30: neurologically unchanged. intermittently on cardene, increase clonidine to 0.3 TID, resuming home irbesartan. D/Fabian HTS 2%, started Na tablets. Transfused with plt x2 by NSGY.  05/01/2019 Patient remains aphasic,slightly worse than prior. No other focal neurologic deficits. Tolerating HD well. SBP goal <160, will attempt to wean off cardene. Awaiting Plt >100 to start heparin SQ. Will obtain a CT scan if any other neurological changes are observed.   05/03/2019 Patient w/ some improvement noted, particularly w/ word finding and naming of objects. HD planned for today. NSGY cleared for step down to Hospital Medicine.   05/04/2019Tolerated HD well, improving neurologically (more conversant, naming capabilities have been improving). NAEON , no new complaints. Stable for step down to Hospital Medicine       Interval History:  Tolerated HD well, improving neurologically (more conversant, naming capabilities have been improving). NAEON , no new complaints. Stable for step down to Hospital M    Review of Systems   Constitutional: Negative for fever.   HENT: Negative for drooling and trouble swallowing.    Respiratory: Negative for choking and shortness of breath.    Gastrointestinal: Negative for nausea and vomiting.   Allergic/Immunologic: Positive for immunocompromised state.   Neurological: Positive for speech difficulty and weakness. Negative for seizures.   Psychiatric/Behavioral: Positive for confusion. Negative for agitation.       Objective:     Vitals:  Temp: 98.4 °F (36.9 °C)  Pulse: 75  Rhythm: normal sinus rhythm  BP: 129/83  MAP (mmHg): 137  Resp: (!) 36  SpO2: 100 %  O2 Device (Oxygen Therapy): room air    Temp  Min: 98.4 °F (36.9 °C)  Max: 98.7 °F (37.1 °C)  Pulse  Min: 67  Max: 79  BP  Min: 129/83  Max: 173/112  MAP (mmHg)  Min: 106  Max: 137  Resp  Min: 7  Max: 36  SpO2   Min: 97 %  Max: 100 %    05/03 0701 - 05/04 0700  In: 1547.8 [P.O.:1250]  Out: 3750    Unmeasured Output  Urine Occurrence: 0  Stool Occurrence: 0  Emesis Occurrence: 0       Physical Exam   Constitutional: She appears well-developed. She appears lethargic. She is cooperative. She appears ill. No distress.   HENT:   Head: Normocephalic.   Eyes: Pupils are equal, round, and reactive to light.   Neck: Normal range of motion.   Cardiovascular: Normal rate and regular rhythm.   Pulmonary/Chest: Effort normal. No tachypnea. No respiratory distress.   Abdominal: Soft. She exhibits no distension.   Musculoskeletal: Normal range of motion. She exhibits no edema.   Neurological: She appears lethargic. She displays no tremor. She displays no seizure activity.   Moderately improved exam in terms of cognitive and verbal function. Improved naming and speech fluency noted.   Remains oriented to self only, but also is aware of the information board in the room and uses it to correctly answer questions regarding date and location.   PERRL, brisk, EOM intact  Moves all 4 extremities       Skin: Skin is warm. She is not diaphoretic.   Psychiatric: She is slowed. She is inattentive.   Vitals reviewed.    Oriented to self and hospital today, but unable to state that she is at [facility]     Medications:  Continuous   Scheduled    sodium chloride 0.9%  Once   sodium chloride 0.9%  Once   bisacodyl 10 mg Daily   calcitriol 1 mcg BID   calcium carbonate 2,000 mg TID   carvedilol 25 mg BID   cloNIDine 0.3 mg Q8H   famotidine 20 mg Daily   hydrALAZINE 100 mg Q8H   irbesartan 300 mg Daily   lacosamide 50 mg BID   levETIRAcetam 500 mg BID   polyethylene glycol 17 g BID   senna-docusate 8.6-50 mg 2 tablet Daily   sodium chloride 2 g BID   tacrolimus 3 mg Daily   tacrolimus 4 mg Daily   verapamil 240 mg Daily   PRN    sodium chloride  Q24H PRN   sodium chloride  Q24H PRN   dextrose 50% 12.5 g PRN   dextrose 50% 25 g PRN   glucagon (human  recombinant) 1 mg PRN   glucose 16 g PRN   glucose 24 g PRN   hydrALAZINE 10 mg Q4H PRN   insulin aspart U-100 1-10 Units QID (AC + HS) PRN   labetalol 10 mg Q4H PRN   ondansetron 4 mg Q6H PRN   oxyCODONE 5 mg Q6H PRN   sodium chloride 0.9% 10 mL PRN       Recent Labs   Lab 05/04/19  0259   WBC 3.72*   RBC 3.17*   HGB 8.7*   HCT 26.7*   PLT 77*   MCV 84   MCH 27.4   MCHC 32.6     Recent Labs   Lab 05/04/19  0259   CALCIUM 8.3*  8.3*   PROT 6.4     137   K 4.4  4.4   CO2 28  28     102   BUN 30*  30*   CREATININE 5.2*  5.2*   ALKPHOS 387*   ALT 10   AST 23   BILITOT 0.7       Diet  Diet renal [facility]; Dysphagia (Mechanical Soft)  Diet renal [facility]; Dysphagia (Mechanical Soft)        Assessment/Plan:     Neuro  * Nontraumatic subcortical hemorrhage of left cerebral hemisphere  S/p urgent left crani for clot evacuation    - Continue to monitor neurochecks q1 hr  - D/Fabian HTS 2%, starting Na tablets 2 gr q12 hr  - target Na 140, monitor BMP q12 hr  - Continue levetiracetam 500 mg q12  - Continue Lacosamide 50 mg BID  - Pt cleared from neurosurgical perspective for stepdown to floor under hospital medicine service when deemed appropriate by primary team.  - Target plt >50K w/o active bleeding  - Plt 77 this AM   - No SQ heparin until plt above 100      Seizures  - Continue home medications: Keppra 500 mg BID, Vimpat 50 mg BID    Renal/  Hypocalcemia  Ca 8,3, corrected calcium  9.4  - Improving   - Continue Calcitriol to 1 mcg q12 hr  - Continue Calcium carbonate 2000 mg q8 hr  - Nephrology following, appreciate recs     ESRD on hemodialysis  ESRD w/ HD on MWF as outpatient  AVF in [facility]    - Nephrology following, appreciate their assistance  - Tolerated regular HD on 5/3 w/o issues.   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    - Level from today 3.9 (subtherapeutic)  - Hepatology following    - Tacrolimus 4 mg PO in the AM and 3 mg PO in the PM          The patient is being Prophylaxed for:  Venous Thromboembolism with: Mechanical  Stress Ulcer with: Not Applicable   Ventilator Pneumonia with: not applicable    Activity Orders          Discontinue arterial line starting at 04/29 1512    Diet renal [facility]; Dysphagia (Mechanical Soft): Renal starting at 04/27 1116    Discontinue arterial line starting at 04/25 1021        Full Code    Yulissa Bell MD  Neurocritical Care  [facility]

## 2019-05-04 NOTE — ASSESSMENT & PLAN NOTE
S/p urgent left crani for clot evacuation    - Continue to monitor neurochecks q1 hr  - D/Fabian HTS 2%, starting Na tablets 2 gr q12 hr  - target Na 140, monitor BMP q12 hr  - Continue levetiracetam 500 mg q12  - Continue Lacosamide 50 mg BID  - Pt cleared from neurosurgical perspective for stepdown to floor under hospital medicine service when deemed appropriate by primary team.  - Target plt >50K w/o active bleeding  - Plt 77 this AM   - No SQ heparin until plt above 100

## 2019-05-04 NOTE — TREATMENT PLAN
Treatment Plan  05/04/2019  3:24 PM    Chart reviewed and case discussed with attending.     We are following Ms. Granadosnes for IS and recommendations are:  --Daily CMP, CBC, and INR   --Daily Tacrolimus level  --Continue current dose of Tacrolimus 4 mg PO in AM and 3 mg PO in PM  --We will continue to follow alongside primary team (please promptly notify us of discharge in order to arrange for appropriate outpatient follow up).    Elinor Medeiros M.D.  Gastroenterology Fellow, PGY-V  Pager: [phone]  [facility]

## 2019-05-04 NOTE — SUBJECTIVE & OBJECTIVE
Interval History:  Tolerated HD well, improving neurologically (more conversant, naming capabilities have been improving). NAEON , no new complaints. Stable for step down to Hospital M    Review of Systems   Constitutional: Negative for fever.   HENT: Negative for drooling and trouble swallowing.    Respiratory: Negative for choking and shortness of breath.    Gastrointestinal: Negative for nausea and vomiting.   Allergic/Immunologic: Positive for immunocompromised state.   Neurological: Positive for speech difficulty and weakness. Negative for seizures.   Psychiatric/Behavioral: Positive for confusion. Negative for agitation.       Objective:     Vitals:  Temp: 98.4 °F (36.9 °C)  Pulse: 75  Rhythm: normal sinus rhythm  BP: 129/83  MAP (mmHg): 137  Resp: (!) 36  SpO2: 100 %  O2 Device (Oxygen Therapy): room air    Temp  Min: 98.4 °F (36.9 °C)  Max: 98.7 °F (37.1 °C)  Pulse  Min: 67  Max: 79  BP  Min: 129/83  Max: 173/112  MAP (mmHg)  Min: 106  Max: 137  Resp  Min: 7  Max: 36  SpO2  Min: 97 %  Max: 100 %    05/03 0701 - 05/04 0700  In: 1547.8 [P.O.:1250]  Out: 3750    Unmeasured Output  Urine Occurrence: 0  Stool Occurrence: 0  Emesis Occurrence: 0       Physical Exam   Constitutional: She appears well-developed. She appears lethargic. She is cooperative. She appears ill. No distress.   HENT:   Head: Normocephalic.   Eyes: Pupils are equal, round, and reactive to light.   Neck: Normal range of motion.   Cardiovascular: Normal rate and regular rhythm.   Pulmonary/Chest: Effort normal. No tachypnea. No respiratory distress.   Abdominal: Soft. She exhibits no distension.   Musculoskeletal: Normal range of motion. She exhibits no edema.   Neurological: She appears lethargic. She displays no tremor. She displays no seizure activity.   Moderately improved exam in terms of cognitive and verbal function. Improved naming and speech fluency noted.   Remains oriented to self only, but also is aware of the information board in  the room and uses it to correctly answer questions regarding date and location.   PERRL, brisk, EOM intact  Moves all 4 extremities       Skin: Skin is warm. She is not diaphoretic.   Psychiatric: She is slowed. She is inattentive.   Vitals reviewed.    Oriented to self and hospital today, but unable to state that she is at [facility]     Medications:  Continuous   Scheduled    sodium chloride 0.9%  Once   sodium chloride 0.9%  Once   bisacodyl 10 mg Daily   calcitriol 1 mcg BID   calcium carbonate 2,000 mg TID   carvedilol 25 mg BID   cloNIDine 0.3 mg Q8H   famotidine 20 mg Daily   hydrALAZINE 100 mg Q8H   irbesartan 300 mg Daily   lacosamide 50 mg BID   levETIRAcetam 500 mg BID   polyethylene glycol 17 g BID   senna-docusate 8.6-50 mg 2 tablet Daily   sodium chloride 2 g BID   tacrolimus 3 mg Daily   tacrolimus 4 mg Daily   verapamil 240 mg Daily   PRN    sodium chloride  Q24H PRN   sodium chloride  Q24H PRN   dextrose 50% 12.5 g PRN   dextrose 50% 25 g PRN   glucagon (human recombinant) 1 mg PRN   glucose 16 g PRN   glucose 24 g PRN   hydrALAZINE 10 mg Q4H PRN   insulin aspart U-100 1-10 Units QID (AC + HS) PRN   labetalol 10 mg Q4H PRN   ondansetron 4 mg Q6H PRN   oxyCODONE 5 mg Q6H PRN   sodium chloride 0.9% 10 mL PRN       Recent Labs   Lab 05/04/19  0259   WBC 3.72*   RBC 3.17*   HGB 8.7*   HCT 26.7*   PLT 77*   MCV 84   MCH 27.4   MCHC 32.6     Recent Labs   Lab 05/04/19  0259   CALCIUM 8.3*  8.3*   PROT 6.4     137   K 4.4  4.4   CO2 28  28     102   BUN 30*  30*   CREATININE 5.2*  5.2*   ALKPHOS 387*   ALT 10   AST 23   BILITOT 0.7       Diet  [facility]; Dysphagia (Mechanical Soft)  Diet renal [facility]; Dysphagia (Mechanical Soft)

## 2019-05-04 NOTE — PLAN OF CARE
Handoff     Primary Team: Networked reference to record PCT  Room Number: 9076/9076 A     Patient Name: Holly Patel MRN: [id number]     Date of Birth: [id number] Allergies: Chloral hydrate and Hydrocodone     Age: 28 y.o. Admit Date: 4/22/2019     Sex: female  BMI: Body mass index is 22.1 kg/m².     Code Status: Full Code        Illness Level (current clinical status): Watcher - NO    Reason for Admission: Nontraumatic subcortical hemorrhage of left cerebral hemisphere    Brief HPI (pertinent PMH and diagnosis or differential diagnosis):   27 yo F w/ PMH significant for liver transplant in 1991, recent thyroidectomy on 3/27/2019, ESRD on HD (MWF), and epilepsy who presents to [facility] for higher level of care following planned surgical excision of L calvarial lesion s/p excision and cranioplasty 4/22/19. The pt presented to [facility] on 03/12/2019 with a complaint of headaches, among other symptoms, and received a workup that included a CT head which showed a left temporal calvarium lesion with mass effect. At that time she shared that she continued to have a headache, noting the pain was usually over the left temporal aspect of her head but migrated over to the right temporal aspect. She states the pain on the right is exacerbated with palpation of the area. She has no additional symptomatic complaints at this time. Currently stable following surgery. Denies acute pain, otherwise comfortable w/ non-focal neurological exam.     Procedure Date: 4/22 evacuation of hematoma    Hospital Course (updated, brief assessment by system or problem, significant events):  4/22: Admitted to [facility]. Initiated cardene gtt to maintain SBP <140. Consulted hepatology and nephrology. Acute worsening of neuro exam. STAT CT revealed enlarging hematoma w/ midline shift. Urgently taken down to OR for evacuation. Return from OR intubated.   4/23: Neuro exam improved from previous. Somnolent but rousable. Following commands. Moving all extremities  spontaneously; RUE 4/5. Pending CRRT vs HD today.  04/24/2019: no acute adverse events overnight  4/27: KUB, resume oral feeds, ionized Ca, HD over night successful, increase hydralazine, decrease HTS  4/28: D/C HTS, increase hydralazine, likely add clonidine today as well, wean cardene, suppository  4/29: slight improvement in neuro exam, following commands. Started on HTS 2%. Increased clonidine and weaned off of cardene this AM, D/Fabian A-line. INR remains elevated. Shifted potassium. nephro with plans for slow HD.   4/30: neurologically unchanged. intermittently on cardene, increase clonidine to 0.3 TID, resuming home irbesartan. D/Fabian HTS 2%, started Na tablets. Transfused with plt x2 by NSGY.  05/01/2019 Patient remains aphasic,slightly worse than prior. No other focal neurologic deficits. Tolerating HD well. SBP goal <160, will attempt to wean off cardene. Awaiting Plt >100 to start heparin SQ. Will obtain a CT scan if any other neurological changes are observed.   05/03/2019 Patient w/ some improvement noted, particularly w/ word finding and naming of objects. HD planned for today. NSGY cleared for step down to Hospital Medicine.   05/04/2019Tolerated HD well, improving neurologically (more conversant, naming capabilities have been improving). NAEON , no new complaints. Stable for step down to Hospital Medicine     Tasks (specific, using if-then statements):   - PT/OT/SLP  - Hepatology recs  - HD MWF and Nephrology recs  - Continue AEDs as is  - Will require an additional dose of Keppra 500 mg after HD sessions   - NSGY following along   - NO SQ heparin until Plt >100    Discharge Disposition: pending

## 2019-05-04 NOTE — ASSESSMENT & PLAN NOTE
ESRD w/ HD on MWF as outpatient  AVF in [place]    - Nephrology following, appreciate their assistance  - Tolerated regular HD on 5/3 w/o issues.   - Monitor BMP q12 hr

## 2019-05-04 NOTE — PROGRESS NOTES
[facility]  Neurosurgery  Progress Note    Subjective:       Post-Op Info:  Procedure(s) (LRB):  CRANIOTOMY-- left crani for clot evac with microscrope (Left)   12 Days Post-Op     NAEON. Stable on exam. Required some PRN for SBP in 170s otherwise no issues.     Medications:  Continuous Infusions:    Scheduled Meds:   sodium chloride 0.9%   Intravenous Once    sodium chloride 0.9%   Intravenous Once    bisacodyl  10 mg Rectal Daily    calcitriol  1 mcg Oral BID    calcium carbonate  2,000 mg Oral TID    carvedilol  25 mg Oral BID    cloNIDine  0.3 mg Oral Q8H    famotidine  20 mg Oral Daily    hydrALAZINE  100 mg Oral Q8H    irbesartan  300 mg Oral Daily    lacosamide  50 mg Oral BID    levETIRAcetam  500 mg Oral BID    polyethylene glycol  17 g Oral BID    senna-docusate 8.6-50 mg  2 tablet Oral Daily    sodium chloride  2 g Oral BID    tacrolimus  3 mg Oral Daily    tacrolimus  4 mg Oral Daily    verapamil  240 mg Oral Daily     PRN Meds:sodium chloride, dextrose 50%, dextrose 50%, glucagon (human recombinant), glucose, glucose, hydrALAZINE, insulin aspart U-100, labetalol, ondansetron, oxyCODONE, sodium chloride 0.9%     Review of Systems     Unable to aphasia   Objective:     Weight: 54.8 kg (120 lb 13 oz)  Body mass index is 22.1 kg/m².  Vital Signs (Most Recent):  Temp: 98.6 °F (37 °C) (05/04/19 1100)  Pulse: 75 (05/04/19 1000)  Resp: (!) 22 (05/04/19 1000)  BP: (!) 173/112 (05/04/19 1000)  SpO2: 100 % (05/04/19 1000) Vital Signs (24h Range):  Temp:  [98.4 °F (36.9 °C)-98.7 °F (37.1 °C)] 98.6 °F (37 °C)  Pulse:  [67-79] 75  Resp:  [7-30] 22  SpO2:  [97 %-100 %] 100 %  BP: (137-173)/() 173/112     Date 05/04/19 0700 - 05/05/19 0659   Shift 5008-1163 7089-9828 3701-0101 24 Hour Total   INTAKE   P.O. 110   110   Shift Total(mL/kg) 110(2)   110(2)   OUTPUT   Urine(mL/kg/hr) 0   0   Shift Total(mL/kg) 0(0)   0(0)   Weight (kg) 54.8 54.8 54.8 54.8                         Hemodialysis AV Fistula Left forearm (Active)   Needle Size 15ga 4/25/2019  1:20 PM   Site Assessment Clean;Dry;Intact 4/25/2019  1:20 PM   Patency Present;Thrill;Bruit 4/25/2019  1:20 PM   Status Accessed 4/25/2019  1:20 PM   Flows Good 4/25/2019  3:05 AM   Dressing Intervention Dressing reinforced 4/25/2019  3:05 AM   Dressing Status Clean;Intact;Dry 4/25/2019 11:01 AM   Site Condition No complications 4/25/2019 11:01 AM   Dressing Occlusive 4/25/2019 11:01 AM   Drainage Description Other (Comment) 4/14/2018  5:26 PM       Neurosurgery Physical Exam  [id number]  AAO1 name  PERRLA   Partial transcortical motor aphasia improving   Move L side and RLE AG, RUE paresis with some effort against gravity   Wound c/d/i        Significant Labs:  Recent Labs   Lab 05/03/19  0300 05/04/19  0259   *  124* 119*  119*     138 137  137   K 4.9  4.9 4.4  4.4     105 102  102   CO2 24  24 28  28   BUN 49*  49* 30*  30*   CREATININE 7.1*  7.1* 5.2*  5.2*   CALCIUM 7.0*  7.0* 8.3*  8.3*     Recent Labs   Lab 05/03/19  0300 05/04/19  0259   WBC 3.88* 3.72*   HGB 8.3* 8.7*   HCT 26.3* 26.7*   PLT 77* 77*     Recent Labs   Lab 05/03/19  0300 05/04/19  0259   INR 1.2 1.2     Microbiology Results (last 7 days)     ** No results found for the last 168 hours. **        All pertinent labs from the last 24 hours have been reviewed.    Significant Diagnostics:  CT: Ct Head Without Contrast    Result Date: 4/25/2019  Evolving operative change from left temporal craniectomy and cranioplasty for left posterior temporal parenchymal hematoma evacuation. There is reduced postoperative pneumocephalus with small volumes of increased parenchymal hemorrhage within and about the resection cavity. Evolving mass effect and edema with continued 5-6 mm of rightward midline shift similar to prior. There is continued small volume intraventricular hemorrhage with slight increase distension of the lateral ventricles concerning for  component of evolving hydrocephalus. 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 Follow up scan with more punctuate ICH and perilesional edema as well as IV after severe hypertensive episode.      No acute events overnight. Pt with improving aphasia.     --Continue care per primary team.  --Continue levetiracetam 500 bid.  --Continue lacosamide 50 bid.  --Continue hourly neuromonitoring.  --Continue strict blood pressure control.  --HD per nephrology   --Daily PT/OT  --Pt cleared from neurosurgical perspective for stepdown to floor under hospital medicine service when deemed appropriate by primary team.   --We will continue to monitor closely, please contact us with any questions or concerns.            Kimberly Roy MD  Neurosurgery  [facility]

## 2019-05-04 NOTE — PROGRESS NOTES
3 hour bedsdie HD completed. 3500 ml fluid removed. Dressings applied to left arm AVF and hemostatis achieved in 5 minutes. + thrill and bruit noted.

## 2019-05-04 NOTE — SUBJECTIVE & OBJECTIVE
KEVEN. Stable on exam. Required some PRN for SBP in 170s otherwise no issues.     Medications:  Continuous Infusions:    Scheduled Meds:   sodium chloride 0.9%   Intravenous Once    sodium chloride 0.9%   Intravenous Once    bisacodyl  10 mg Rectal Daily    calcitriol  1 mcg Oral BID    calcium carbonate  2,000 mg Oral TID    carvedilol  25 mg Oral BID    cloNIDine  0.3 mg Oral Q8H    famotidine  20 mg Oral Daily    hydrALAZINE  100 mg Oral Q8H    irbesartan  300 mg Oral Daily    lacosamide  50 mg Oral BID    levETIRAcetam  500 mg Oral BID    polyethylene glycol  17 g Oral BID    senna-docusate 8.6-50 mg  2 tablet Oral Daily    sodium chloride  2 g Oral BID    tacrolimus  3 mg Oral Daily    tacrolimus  4 mg Oral Daily    verapamil  240 mg Oral Daily     PRN Meds:sodium chloride, dextrose 50%, dextrose 50%, glucagon (human recombinant), glucose, glucose, hydrALAZINE, insulin aspart U-100, labetalol, ondansetron, oxyCODONE, sodium chloride 0.9%     Review of Systems     Unable to aphasia   Objective:     Weight: 54.8 kg (120 lb 13 oz)  Body mass index is 22.1 kg/m².  Vital Signs (Most Recent):  Temp: 98.6 °F (37 °C) (05/04/19 1100)  Pulse: 75 (05/04/19 1000)  Resp: (!) 22 (05/04/19 1000)  BP: (!) 173/112 (05/04/19 1000)  SpO2: 100 % (05/04/19 1000) Vital Signs (24h Range):  Temp:  [98.4 °F (36.9 °C)-98.7 °F (37.1 °C)] 98.6 °F (37 °C)  Pulse:  [67-79] 75  Resp:  [7-30] 22  SpO2:  [97 %-100 %] 100 %  BP: (137-173)/() 173/112     Date 05/04/19 0700 - 05/05/19 0659   Shift 4431-8039 6700-9509 6533-5820 24 Hour Total   INTAKE   P.O. 110   110   Shift Total(mL/kg) 110(2)   110(2)   OUTPUT   Urine(mL/kg/hr) 0   0   Shift Total(mL/kg) 0(0)   0(0)   Weight (kg) 54.8 54.8 54.8 54.8                        Hemodialysis AV Fistula Left forearm (Active)   Needle Size 15ga 4/25/2019  1:20 PM   Site Assessment Clean;Dry;Intact 4/25/2019  1:20 PM   Patency Present;Thrill;Bruit 4/25/2019  1:20 PM   Status  Accessed 4/25/2019  1:20 PM   Flows Good 4/25/2019  3:05 AM   Dressing Intervention Dressing reinforced 4/25/2019  3:05 AM   Dressing Status Clean;Intact;Dry 4/25/2019 11:01 AM   Site Condition No complications 4/25/2019 11:01 AM   Dressing Occlusive 4/25/2019 11:01 AM   Drainage Description Other (Comment) 4/14/2018  5:26 PM       Neurosurgery Physical Exam  [id number]  AAO1 name  PERRLA   Partial transcortical motor aphasia improving   Move L side and RLE AG, RUE paresis with some effort against gravity   Wound c/d/i        Significant Labs:  Recent Labs   Lab 05/03/19  0300 05/04/19  0259   *  124* 119*  119*     138 137  137   K 4.9  4.9 4.4  4.4     105 102  102   CO2 24  24 28  28   BUN 49*  49* 30*  30*   CREATININE 7.1*  7.1* 5.2*  5.2*   CALCIUM 7.0*  7.0* 8.3*  8.3*     Recent Labs   Lab 05/03/19 0300 05/04/19  0259   WBC 3.88* 3.72*   HGB 8.3* 8.7*   HCT 26.3* 26.7*   PLT 77* 77*     Recent Labs   Lab 05/03/19 0300 05/04/19  0259   INR 1.2 1.2     Microbiology Results (last 7 days)     ** No results found for the last 168 hours. **        All pertinent labs from the last 24 hours have been reviewed.    Significant Diagnostics:  CT: Ct Head Without Contrast    Result Date: 4/25/2019  Evolving operative change from left temporal craniectomy and cranioplasty for left posterior temporal parenchymal hematoma evacuation. There is reduced postoperative pneumocephalus with small volumes of increased parenchymal hemorrhage within and about the resection cavity. Evolving mass effect and edema with continued 5-6 mm of rightward midline shift similar to prior. There is continued small volume intraventricular hemorrhage with slight increase distension of the lateral ventricles concerning for component of evolving hydrocephalus. Continued diffuse effacement cerebral sulci at the vertex concerning for sequela of cerebral edema or evolving hydrocephalus Electronically signed by: Reggie  DO Alexis Date:    04/25/2019 Time:    15:50

## 2019-05-04 NOTE — PROGRESS NOTES
Called to give report to EDMOND Peña, she stated she was unable to take report at this time and would call me when she could.

## 2019-05-04 NOTE — PLAN OF CARE
Problem: Adult Inpatient Plan of Care  Goal: Plan of Care Review  Outcome: Ongoing (interventions implemented as appropriate)  No acute events throughout shift, VS and assessment per flow sheet, patient progressing towards goals as tolerated, plan of care reviewed with Holly Patel and family, all concerns addressed, will continue to monitor.

## 2019-05-05 PROBLEM — G93.9 BRAIN LESION: Status: RESOLVED | Noted: 2019-01-01 | Resolved: 2019-01-01

## 2019-05-05 NOTE — SUBJECTIVE & OBJECTIVE
Stepped down. NAEON. Stable in exam. PLT 78 after transfusion. SBP top 170s overnight.     Medications:  Continuous Infusions:    Scheduled Meds:   [START ON 5/6/2019] sodium chloride 0.9%   Intravenous Once    bisacodyl  10 mg Rectal Daily    calcitriol  1 mcg Oral BID    calcium carbonate  2,000 mg Oral TID    carvedilol  25 mg Oral BID    cloNIDine  0.3 mg Oral Q8H    famotidine  20 mg Oral Daily    hydrALAZINE  100 mg Oral Q8H    irbesartan  300 mg Oral Daily    lacosamide  50 mg Oral BID    levETIRAcetam  500 mg Oral BID    polyethylene glycol  17 g Oral BID    senna-docusate 8.6-50 mg  2 tablet Oral Daily    sodium chloride  2 g Oral BID    sodium polystyrene  30 g Oral Once    tacrolimus  3 mg Oral Daily    tacrolimus  4 mg Oral Daily    verapamil  240 mg Oral Daily     PRN Meds:[START ON 5/6/2019] sodium chloride 0.9%, dextrose 50%, dextrose 50%, glucagon (human recombinant), glucose, glucose, hydrALAZINE, insulin aspart U-100, labetalol, ondansetron, oxyCODONE, sodium chloride 0.9%     Review of Systems     Unable to aphasia   Objective:     Weight: 54.8 kg (120 lb 13 oz)  Body mass index is 22.1 kg/m².  Vital Signs (Most Recent):  Temp: 96.6 °F (35.9 °C) (05/05/19 1144)  Pulse: 74 (05/05/19 1144)  Resp: 15 (05/05/19 1144)  BP: 126/84 (05/05/19 1144)  SpO2: 100 % (05/05/19 1144) Vital Signs (24h Range):  Temp:  [96.6 °F (35.9 °C)-98.5 °F (36.9 °C)] 96.6 °F (35.9 °C)  Pulse:  [73-78] 74  Resp:  [15-27] 15  SpO2:  [87 %-100 %] 100 %  BP: (126-183)/() 126/84                          Hemodialysis AV Fistula Left forearm (Active)   Needle Size 15ga 4/25/2019  1:20 PM   Site Assessment Clean;Dry;Intact 4/25/2019  1:20 PM   Patency Present;Thrill;Bruit 4/25/2019  1:20 PM   Status Accessed 4/25/2019  1:20 PM   Flows Good 4/25/2019  3:05 AM   Dressing Intervention Dressing reinforced 4/25/2019  3:05 AM   Dressing Status Clean;Intact;Dry 4/25/2019 11:01 AM   Site Condition No complications  4/25/2019 11:01 AM   Dressing Occlusive 4/25/2019 11:01 AM   Drainage Description Other (Comment) 4/14/2018  5:26 PM       Neurosurgery Physical Exam  E4V4M6  AAO1 name  SHRAVAN   Partial transcortical motor aphasia improving   Move L side and RLE AG, RUE paresis with some effort against gravity   Wound c/d/i        Significant Labs:  Recent Labs   Lab 05/04/19 0259 05/05/19  0409   *  119* 121*  121*     137 140  140   K 4.4  4.4 5.5*  5.5*     102 104  104   CO2 28  28 27  27   BUN 30*  30* 41*  41*   CREATININE 5.2*  5.2* 6.8*  6.8*   CALCIUM 8.3*  8.3* 8.1*  8.1*     Recent Labs   Lab 05/04/19 0259 05/05/19  0409   WBC 3.72* 3.62*   HGB 8.7* 8.2*   HCT 26.7* 25.3*   PLT 77* 78*     Recent Labs   Lab 05/04/19 0259 05/05/19  0409   INR 1.2 1.2     Microbiology Results (last 7 days)     ** No results found for the last 168 hours. **        All pertinent labs from the last 24 hours have been reviewed.    Significant Diagnostics:  CT: Ct Head Without Contrast    Result Date: 4/25/2019  Evolving operative change from left temporal craniectomy and cranioplasty for left posterior temporal parenchymal hematoma evacuation. There is reduced postoperative pneumocephalus with small volumes of increased parenchymal hemorrhage within and about the resection cavity. Evolving mass effect and edema with continued 5-6 mm of rightward midline shift similar to prior. There is continued small volume intraventricular hemorrhage with slight increase distension of the lateral ventricles concerning for component of evolving hydrocephalus. Continued diffuse effacement cerebral sulci at the vertex concerning for sequela of cerebral edema or evolving hydrocephalus Electronically signed by: Reggie Espinoza DO Date:    04/25/2019 Time:    15:50

## 2019-05-05 NOTE — PROGRESS NOTES
[facility]  Neurosurgery  Progress Note    Subjective:       Post-Op Info:  Procedure(s) (LRB):  CRANIOTOMY-- left crani for clot evac with microscrope (Left)   13 Days Post-Op     Stepped down. NAEON. Stable in exam. PLT 78 after transfusion. SBP top 170s overnight.     Medications:  Continuous Infusions:    Scheduled Meds:   [START ON 5/6/2019] sodium chloride 0.9%   Intravenous Once    bisacodyl  10 mg Rectal Daily    calcitriol  1 mcg Oral BID    calcium carbonate  2,000 mg Oral TID    carvedilol  25 mg Oral BID    cloNIDine  0.3 mg Oral Q8H    famotidine  20 mg Oral Daily    hydrALAZINE  100 mg Oral Q8H    irbesartan  300 mg Oral Daily    lacosamide  50 mg Oral BID    levETIRAcetam  500 mg Oral BID    polyethylene glycol  17 g Oral BID    senna-docusate 8.6-50 mg  2 tablet Oral Daily    sodium chloride  2 g Oral BID    sodium polystyrene  30 g Oral Once    tacrolimus  3 mg Oral Daily    tacrolimus  4 mg Oral Daily    verapamil  240 mg Oral Daily     PRN Meds:[START ON 5/6/2019] sodium chloride 0.9%, dextrose 50%, dextrose 50%, glucagon (human recombinant), glucose, glucose, hydrALAZINE, insulin aspart U-100, labetalol, ondansetron, oxyCODONE, sodium chloride 0.9%     Review of Systems     Unable to aphasia   Objective:     Weight: 54.8 kg (120 lb 13 oz)  Body mass index is 22.1 kg/m².  Vital Signs (Most Recent):  Temp: 96.6 °F (35.9 °C) (05/05/19 1144)  Pulse: 74 (05/05/19 1144)  Resp: 15 (05/05/19 1144)  BP: 126/84 (05/05/19 1144)  SpO2: 100 % (05/05/19 1144) Vital Signs (24h Range):  Temp:  [96.6 °F (35.9 °C)-98.5 °F (36.9 °C)] 96.6 °F (35.9 °C)  Pulse:  [73-78] 74  Resp:  [15-27] 15  SpO2:  [87 %-100 %] 100 %  BP: (126-183)/() 126/84                          Hemodialysis AV Fistula Left forearm (Active)   Needle Size 15ga 4/25/2019  1:20 PM   Site Assessment Clean;Dry;Intact 4/25/2019  1:20 PM   Patency Present;Thrill;Bruit 4/25/2019  1:20 PM   Status Accessed  4/25/2019  1:20 PM   Flows Good 4/25/2019  3:05 AM   Dressing Intervention Dressing reinforced 4/25/2019  3:05 AM   Dressing Status Clean;Intact;Dry 4/25/2019 11:01 AM   Site Condition No complications 4/25/2019 11:01 AM   Dressing Occlusive 4/25/2019 11:01 AM   Drainage Description Other (Comment) 4/14/2018  5:26 PM       Neurosurgery Physical Exam  E4V4M6  AAO1 name  PERRLA   Partial transcortical motor aphasia improving   Move L side and RLE AG, RUE paresis with some effort against gravity   Wound c/d/i        Significant Labs:  Recent Labs   Lab 05/04/19 0259 05/05/19  0409   *  119* 121*  121*     137 140  140   K 4.4  4.4 5.5*  5.5*     102 104  104   CO2 28  28 27  27   BUN 30*  30* 41*  41*   CREATININE 5.2*  5.2* 6.8*  6.8*   CALCIUM 8.3*  8.3* 8.1*  8.1*     Recent Labs   Lab 05/04/19 0259 05/05/19  0409   WBC 3.72* 3.62*   HGB 8.7* 8.2*   HCT 26.7* 25.3*   PLT 77* 78*     Recent Labs   Lab 05/04/19 0259 05/05/19  0409   INR 1.2 1.2     Microbiology Results (last 7 days)     ** No results found for the last 168 hours. **        All pertinent labs from the last 24 hours have been reviewed.    Significant Diagnostics:  CT: Ct Head Without Contrast    Result Date: 4/25/2019  Evolving operative change from left temporal craniectomy and cranioplasty for left posterior temporal parenchymal hematoma evacuation. There is reduced postoperative pneumocephalus with small volumes of increased parenchymal hemorrhage within and about the resection cavity. Evolving mass effect and edema with continued 5-6 mm of rightward midline shift similar to prior. There is continued small volume intraventricular hemorrhage with slight increase distension of the lateral ventricles concerning for component of evolving hydrocephalus. 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 Follow up scan with more punctuate ICH and perilesional edema as well as IV after severe hypertensive episode.      No acute events overnight. Pt with improving aphasia.     --Continue care per primary team.  --Continue levetiracetam 500 bid.  --Continue lacosamide 50 bid.  --Continue hourly neuromonitoring.  --Continue strict blood pressure control <160  --HD per nephrology   --PLT goal >80K, pleased transfuse PRN   --Daily PT/OT  --Likely needs IPR   --We will continue to monitor closely, please contact us with any questions or concerns.            Kimberly Roy MD  Neurosurgery  [facility]

## 2019-05-05 NOTE — PROGRESS NOTES
[facility] Hospital Medicine  Progress Note    Patient Name: Holly Patel  MRN: [id number]  Patient Class: IP- Inpatient   Admission Date: 4/22/2019  Length of Stay: 13 days  Attending Physician: Zeenat Almanza MD  Primary Care Provider: Stan Sosa MD    [facility] Medicine Team: [facility]OSP MED G Zeenat Almanza MD    Subjective:     Principal Problem:Nontraumatic subcortical hemorrhage of left cerebral hemisphere    HPI:  No notes on file    Hospital Course:  No notes on file    Interval History: Stepped down from [facility]. Admitted on 4/22 for planned excision of L calvarial lesion and cranioplasty. She also had temporal ICH and s/p clot evacuation. Patient feels fatigued but overall doing well. BPs not sustaining >160s. Platelets remaining in the high 70s. Mother at bedside.     Review of Systems   Constitutional: Positive for fatigue. Negative for chills and fever.   HENT: Negative.    Eyes: Negative for visual disturbance.   Respiratory: Negative for cough, chest tightness and shortness of breath.    Gastrointestinal: Negative for nausea and vomiting.   Musculoskeletal: Negative.    Neurological: Negative for light-headedness, numbness and headaches.     Objective:     Vital Signs (Most Recent):  Temp: 96.6 °F (35.9 °C) (05/05/19 1144)  Pulse: 74 (05/05/19 1144)  Resp: 15 (05/05/19 1144)  BP: 126/84 (05/05/19 1144)  SpO2: 100 % (05/05/19 1144) Vital Signs (24h Range):  Temp:  [96.6 °F (35.9 °C)-98.5 °F (36.9 °C)] 96.6 °F (35.9 °C)  Pulse:  [73-78] 74  Resp:  [15-27] 15  SpO2:  [87 %-100 %] 100 %  BP: (126-183)/() 126/84     Weight: 54.8 kg (120 lb 13 oz)  Body mass index is 22.1 kg/m².    Intake/Output Summary (Last 24 hours) at 5/5/2019 1442  Last data filed at 5/4/2019 1640  Gross per 24 hour   Intake 632 ml   Output 0 ml   Net 632 ml      Physical Exam   Constitutional: She is oriented to person, place, and time. She appears well-developed and well-nourished. No distress.    HENT:   Head: Normocephalic.   Staples in place over left temporal area. No erythema or other drainage noted.   Neck: Neck supple.   Cardiovascular: Normal rate and regular rhythm.   Pulmonary/Chest: Effort normal and breath sounds normal.   Abdominal: There is no tenderness.   Distended but not tense.    Neurological: She is alert and oriented to person, place, and time.   Skin: Skin is warm. No erythema.   Vitals reviewed.      Significant Labs: All pertinent labs within the past 24 hours have been reviewed.    Significant Imaging: I have reviewed all pertinent imaging results/findings within the past 24 hours.    Assessment/Plan:      * Nontraumatic subcortical hemorrhage of left cerebral hemisphere  S/P clot evacuation and resection of L temporal lesion with cranioplasty on 4/22  Neurosurgery following  Transfuse to keep platelets > 80  BP control < 160  Continue keppra and lacosamide  Per NS, an additional dose of keppra 500 mg to be given after HD on HD days  PTOT    Brain tumor  Per above       Hypocalcemia  Corrected Ca still low  Continue calcium carbonate and calcitriol  HD planned for 5/6      Thrombocytopenia  Transfusing as per ICH      Immunosuppressed  Per liver transplant       Renovascular hypertension  BP acceptable; not sustaining > 160s  Continue HD as scheduled  Continue verapamil, irbesartan, clonidine, hydralazine, and carvedilol      ESRD on hemodialysis  HD as scheduled  Kayexalate x 1 dose for hyperkalemia today   Continue calcitriol     Liver replaced by transplant  Appreciate hepatology consult   Continue Tacrolimus 4 mg PO in AM and 3 mg PO in PM            VTE Risk Mitigation (From admission, onward)        Ordered     IP VTE HIGH RISK PATIENT  Once      04/22/19 1137     Place sequential compression device  Until discontinued      04/22/19 1137              Zeenat Almanza MD  Department of Hospital Medicine   [facility]

## 2019-05-05 NOTE — ASSESSMENT & PLAN NOTE
BP acceptable; not sustaining > 160s  Continue HD as scheduled  Continue verapamil, irbesartan, clonidine, hydralazine, and carvedilol

## 2019-05-05 NOTE — SUBJECTIVE & OBJECTIVE
Interval History: Stepped down from [facility]. Admitted on 4/22 for planned excision of L calvarial lesion and cranioplasty. She also had temporal ICH and s/p clot evacuation. Patient feels fatigued but overall doing well. BPs not sustaining >160s. Platelets remaining in the high 70s. Mother at bedside.     Review of Systems   Constitutional: Positive for fatigue. Negative for chills and fever.   HENT: Negative.    Eyes: Negative for visual disturbance.   Respiratory: Negative for cough, chest tightness and shortness of breath.    Gastrointestinal: Negative for nausea and vomiting.   Musculoskeletal: Negative.    Neurological: Negative for light-headedness, numbness and headaches.     Objective:     Vital Signs (Most Recent):  Temp: 96.6 °F (35.9 °C) (05/05/19 1144)  Pulse: 74 (05/05/19 1144)  Resp: 15 (05/05/19 1144)  BP: 126/84 (05/05/19 1144)  SpO2: 100 % (05/05/19 1144) Vital Signs (24h Range):  Temp:  [96.6 °F (35.9 °C)-98.5 °F (36.9 °C)] 96.6 °F (35.9 °C)  Pulse:  [73-78] 74  Resp:  [15-27] 15  SpO2:  [87 %-100 %] 100 %  BP: (126-183)/() 126/84     Weight: 54.8 kg (120 lb 13 oz)  Body mass index is 22.1 kg/m².    Intake/Output Summary (Last 24 hours) at 5/5/2019 1442  Last data filed at 5/4/2019 1640  Gross per 24 hour   Intake 632 ml   Output 0 ml   Net 632 ml      Physical Exam   Constitutional: She is oriented to person, place, and time. She appears well-developed and well-nourished. No distress.   HENT:   Head: Normocephalic.   Staples in place over left temporal area. No erythema or other drainage noted.   Neck: Neck supple.   Cardiovascular: Normal rate and regular rhythm.   Pulmonary/Chest: Effort normal and breath sounds normal.   Abdominal: There is no tenderness.   Distended but not tense.    Neurological: She is alert and oriented to person, place, and time.   Skin: Skin is warm. No erythema.   Vitals reviewed.      Significant Labs: All pertinent labs within the past 24 hours have been  reviewed.    Significant Imaging: I have reviewed all pertinent imaging results/findings within the past 24 hours.

## 2019-05-05 NOTE — TREATMENT PLAN
Treatment Plan  05/05/2019  4:26 PM    Chart reviewed and case discussed with attending.     We are following Ms. Granadosnes for IS and recommendations are:  --Daily CMP, CBC, and INR   --Daily Tacrolimus level  --Continue current dose of Tacrolimus 4 mg PO in AM and 3 mg PO in PM  --We will continue to follow alongside primary team (please promptly notify us of discharge in order to arrange for appropriate outpatient follow up).    Elinor Medeiros M.D.  Gastroenterology Fellow, PGY-V  Pager: [phone]  [facility]

## 2019-05-05 NOTE — ASSESSMENT & PLAN NOTE
S/P clot evacuation and resection of L temporal lesion with cranioplasty on 4/22  Neurosurgery following  Transfuse to keep platelets > 80  BP control < 160  Continue keppra and lacosamide  Per NS, an additional dose of keppra 500 mg to be given after HD on HD days  PTOT

## 2019-05-05 NOTE — ASSESSMENT & PLAN NOTE
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 Follow up scan with more punctuate ICH and perilesional edema as well as IV after severe hypertensive episode.      No acute events overnight. Pt with improving aphasia.     --Continue care per primary team.  --Continue levetiracetam 500 bid.  --Continue lacosamide 50 bid.  --Continue hourly neuromonitoring.  --Continue strict blood pressure control <160  --HD per nephrology   --PLT goal >80K, pleased transfuse PRN   --Daily PT/OT  --Likely needs IPR   --We will continue to monitor closely, please contact us with any questions or concerns.

## 2019-05-06 PROBLEM — Z98.890 S/P CRANIOTOMY: Status: ACTIVE | Noted: 2019-01-01

## 2019-05-06 NOTE — HPI
Ms. Holly Patel is a pleasant 28-year-old woman who has end-stage renal disease.  The patient has had a transplant in the past and is on immunotherapy for this.  The patient is currently seeking to have another transplant.  However, a recent imaging of her head showed lytic lesions of the skull.  These were concerning.  The patient is being seen by me for this.  The patient comes in today to have removal of the largest lesion, which was approximately 2 cm in diameter for diagnosis.

## 2019-05-06 NOTE — ASSESSMENT & PLAN NOTE
S/P clot evacuation and resection of L temporal lesion with cranioplasty on 4/22  Neurosurgery following with recs as per below:    Patient neurologically stable on exam  -Staples removed without complication. Patient tolerated removal well. OK to get incision wet.  -Continue Keppra and Vimpat for seizure prevention  -Maintain SBP < 160  -OK to relax Plt goal to 60k. Please transfuse PRN  -Please continue to hold any anticoagulation or DVT prophylaxis   -Follow up with Dr. Powell in 2 weeks with head CT. NSGY will schedule this appt.    Per NS, an additional dose of keppra 500 mg to be given after HD on HD days  PTOT

## 2019-05-06 NOTE — PROGRESS NOTES
Pt is resting with eyes closed. Family at bedside. Stroke education done. Stroke book individualized and reviewed with family. Instructed on importance of low salt low fat diet and medication compliance. Also instructed on stroke risk factors and when to call 911. Family verbalized understanding. Pt nodding off to sleep during visit.

## 2019-05-06 NOTE — ASSESSMENT & PLAN NOTE
BPs elevated today but will receive HD   Continue HD as scheduled  Continue verapamil, irbesartan, clonidine, hydralazine, and carvedilol

## 2019-05-06 NOTE — PROGRESS NOTES
[facility]  Physical Medicine & Rehab  Progress Note    Patient Name: Holly Patel  MRN: [id number]  Admission Date: 4/22/2019  Length of Stay: 14 days  Attending Physician: Zeenat Almanza MD    Subjective:     Principal Problem:Nontraumatic subcortical hemorrhage of left cerebral hemisphere    Hospital Course:   04/26/2019: Participated with OT.  Bed mobility CGA-Maddy.  Sit to stand and transfers Maddy.  Ambulated  4 steps Maddy with mild dizziness.  UBD/grooming Maddy and Feeding CGA.  04/29/2019: Participated with PT.  Bed mobility Maddy.  Sit to stand and transfers Maddy.  Ambulated 5 steps Maddy. Passed bedside swallow evaluation.  SLP recommending dental soft diet and thin liquids. SLP diagnosis of Aphasia and Cognitive-Linguistic Impairment.   04/30/2019: Participated with therapy.  Bed mobility CGA-Maddy.  Sit to stand CGA and transfers Maddy.  Ambulated 12 ft and 12 ft Maddy-CGA. No grooming.   5/1/19: Participated w/ OT. Static standing CGA. Endurance exercises x 5 sit to stand CGA. Declined adls.   05/02/2019: Participated with therapy.  Bed mobility Maddy.  Sit to stand Maddy.  Ambulated 20 ft x 2 trials Maddy & RW.    05/03/2019: Participated with therapy.  Bed mobility Maddy.  Sit to stand CGA.  LBD/grooming Maddy.    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)    Moderate protein-calorie malnutrition  2017    MRSA bacteremia 2017    Pneumonia     Prophylactic immunotherapy 2014    Renovascular hypertension 10/2/2015    Secondary hyperparathyroidism 2017    Seizures     Sialadenitis 3/21/2018    Thrombocytopenia 2016     Past Surgical History:   Procedure Laterality Date    BIOPSY, LIVER, TRANSJUGULAR APPROACH N/A 2018    [facility]    BIOPSY-LIVER N/A 2017    [facility]    BIOPSY-LIVER N/A 3/22/2016    Performed by [facility] Provider at [facility] OR [address] [place] [address]     SECTION      x 2    CONIZATION-CERVICAL-LEEP N/A 6/15/2018    Performed by [facility]elam Marroquin [facility] at [facility] OR    [id number]; upper extremity Left 2015    Performed by Idalia Diaz MD at [facility] OR [address] [place] [address]    CRANIOPLASTY  2019    Performed by Jose Luis Powell MD at [facility] OR [address] [place] [address]    CRANIOTOMY-- left crani for clot evac with microscrope Left 2019    Performed by Jose Luis Powell MD at [facility] OR [address] [place] [address]    EMBOLIZATION, BLOOD VESSEL N/A 2018    Performed by Aren Ramos MD at [facility] CATH LAB    Exam Under Anesthesia N/A 2018    Performed by Neelam Marroquin MD at [facility] OR [address] [place] [address]    Exam under anesthesia N/A 2018    Performed by [facility]am Marroquin MD at [facility] OR    Exam under anesthesia (ADD ON ) N/A 2018    Performed by NICKIE Alvarez MD at [facility] OR    Exam under anesthesia -cervical suturing  N/A 2018    Performed by Lei Sims III, MD at [facility] OR    EXCISION, NEOPLASM, SKULL with Cranioplasty Left 2019    Performed by Jose Luis Powell MD at [facility] OR [address] [place] [address]    FISTULOGRAM Left 2015    Performed by Idalia Diaz MD at [facility] CATH LAB    LIVER BIOPSY      LIVER TRANSPLANT  1992    PARATHYROIDECTOMY, Minimally Invasive Bilateral Exploration Bilateral 3/27/2019    Performed by Ashley Guallpa MD at [facility] OR [address] [place] [address]     SUTURE REPAIR,CERVIX  6/19/2018    Performed by [facility] at [facility] OR    TUBAL LIGATION  2010     Review of patient's allergies indicates:   Allergen Reactions    Chloral hydrate Hallucinations     Other reaction(s): Hallucinations  Other reaction(s): Hives    Hydrocodone Other (See Comments)     Mental status changes    Tolerates oxycodone       Scheduled Medications:    sodium chloride 0.9%   Intravenous Once    bisacodyl  10 mg Rectal Daily    calcitriol  1 mcg Oral BID    calcium carbonate  2,000 mg Oral TID    carvedilol  25 mg Oral BID    cloNIDine  0.3 mg Oral Q8H    famotidine  20 mg Oral Daily    hydrALAZINE  100 mg Oral Q8H    irbesartan  300 mg Oral Daily    lacosamide  50 mg Oral BID    levETIRAcetam  500 mg Oral BID    polyethylene glycol  17 g Oral BID    senna-docusate 8.6-50 mg  2 tablet Oral Daily    sodium chloride  2 g Oral BID    tacrolimus  3 mg Oral Daily    tacrolimus  4 mg Oral Daily    verapamil  240 mg Oral Daily       PRN Medications: sodium chloride, sodium chloride 0.9%, hydrALAZINE, labetalol, ondansetron, oxyCODONE, sodium chloride 0.9%    Family History     Problem Relation (Age of Onset)    Cancer Sister    Heart attack Maternal Uncle    Hypertension Mother, Father        Tobacco Use    Smoking status: Never Smoker    Smokeless tobacco: Never Used   Substance and Sexual Activity    Alcohol use: No    Drug use: No    Sexual activity: Never     Partners: Male     Review of Systems   Constitutional: Positive for activity change.   HENT: Positive for trouble swallowing.    Respiratory: Negative for cough and shortness of breath.    Cardiovascular: Negative for chest pain and palpitations.   Musculoskeletal: Positive for gait problem.   Neurological: Positive for speech difficulty and weakness.   Psychiatric/Behavioral: Positive for confusion. Negative for agitation.     Objective:     Vital Signs (Most Recent):  Temp: 96.8 °F (36 °C) (05/06/19  0806)  Pulse: 81 (05/06/19 0806)  Resp: 18 (05/06/19 0806)  BP: (!) 195/128 (05/06/19 0806)  SpO2: 100 % (05/06/19 0806)    Vital Signs (24h Range):  Temp:  [96.6 °F (35.9 °C)-98.1 °F (36.7 °C)] 96.8 °F (36 °C)  Pulse:  [74-83] 81  Resp:  [15-18] 18  SpO2:  [95 %-100 %] 100 %  BP: (126-200)/() 195/128     Body mass index is 22.1 kg/m².    Physical Exam   Constitutional: She appears well-developed and well-nourished.   HENT:   Head: Normocephalic and atraumatic.   Neck: Neck supple.   Pulmonary/Chest: Effort normal. No respiratory distress.   Abdominal: Soft. She exhibits no distension.   Neurological: She exhibits abnormal muscle tone.   - Alert in bed  - Following commands  - Decreased muscle strength noted    Skin: Skin is warm and dry.   Psychiatric: Her behavior is normal. Cognition and memory are impaired.   Nursing note and vitals reviewed.      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 Follow up scan with more punctuate ICH and perilesional edema as well as IV after severe hypertensive episode per NSGY       See hospital course for functional, cognitive/speech/language, and nutrition/swallow status.      Recommendations  -  Encourage mobility, OOB in chair at least 3 hours per day, and early ambulation as appropriate   -  PT/OT evaluate and treat  -  SLP speech and cognitive evaluate and treat  -  Monitor sleep disturbances and establish consistent sleep-wake cycle  -  Monitor for bowel and bladder dysfunction  -  Monitor for shoulder pain, subluxation, & spasticity  -  Monitor for and prevent skin breakdown and pressure ulcers  · Early mobility, repositioning/weight shifting every 20-30 minutes when sitting, turn patient every 2 hours, proper mattress/overlay and chair cushioning, pressure  relief/heel protector boots  -  DVT prophylaxis (if appropriate)  -  Reviewed discharge options (IP rehab, SNF, HH therapy, and OP therapy)    Hypocalcemia  -nephrology managing     Renovascular hypertension  - managing  -on verapamil, irbesartan, clonidine, hydralazine, and carvedilol    ESRD on hemodialysis  -nephrology following    Liver replaced by transplant  -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        Odessa Murrell NP  Department of Physical Medicine & Rehab   [facility]

## 2019-05-06 NOTE — PROGRESS NOTES
[facility]     Patient currently on hemodialysis for removal of uremic toxins and volume.     Patient seen and evaluated on hemodialysis, tolerating treatment, see HD flowsheet for vitals and assessments.      Ultrafiltration goal is 1-2L as tolerated      Labs have been reviewed and the dialysate bath has been adjusted.     Assessment/Plan:    Patient seen while on HD and tolerating well. Denies CP, SOB, dizziness, cramping, or N/V.     Hgb 7.9, will start procrit 3000u MWF with dialysis     Corrected Ca 9.6, Ca bath adjusted accordingly       JANESSA Mueller, AGNP-C  Nephrology  Pager:  [phone]

## 2019-05-06 NOTE — PROGRESS NOTES
[facility]  Neurosurgery  Progress Note    Subjective:     History of Present Illness:   Ms. Holly Patel is a pleasant 28-year-old woman who has end-stage renal disease.  The patient has had a transplant in the past and is on immunotherapy for this.  The patient is currently seeking to have another   transplant.  However, a recent imaging of her head showed lytic lesions of the skull.  These were concerning. The patient is being seen by me for this.  The patient comes in today to have removal of the largest lesion, which was approximately 2 cm in diameter for diagnosis.      Post-Op Info:  Procedure(s) (LRB):  CRANIOTOMY-- left crani for clot evac with microscrope (Left)   14 Days Post-Op     Interval History:   NAEON. Patient resting comfortably in bed. No family at bedside. Denies any pain or headaches. Remains aphasic.     Medications:  Continuous Infusions:  Scheduled Meds:   sodium chloride 0.9%   Intravenous Once    bisacodyl  10 mg Rectal Daily    calcitriol  1 mcg Oral BID    calcium carbonate  2,000 mg Oral TID    carvedilol  25 mg Oral BID    cloNIDine  0.3 mg Oral Q8H    famotidine  20 mg Oral Daily    hydrALAZINE  100 mg Oral Q8H    irbesartan  300 mg Oral Daily    lacosamide  50 mg Oral BID    levETIRAcetam  500 mg Oral BID    polyethylene glycol  17 g Oral BID    senna-docusate 8.6-50 mg  2 tablet Oral Daily    sodium chloride  2 g Oral BID    tacrolimus  3 mg Oral Daily    tacrolimus  4 mg Oral Daily    verapamil  240 mg Oral Daily     PRN Meds:sodium chloride, sodium chloride 0.9%, hydrALAZINE, labetalol, ondansetron, oxyCODONE, sodium chloride 0.9%     Review of Systems  Objective:     Weight: 54.8 kg (120 lb 13 oz)  Body mass index is 22.1 kg/m².  Vital Signs (Most Recent):  Temp: 96.8 °F (36 °C) (05/06/19 0806)  Pulse: 81 (05/06/19 0806)  Resp: 18 (05/06/19 0806)  BP: (!) 195/128 (05/06/19 0806)  SpO2: 100 % (05/06/19 0806) Vital Signs (24h Range):  Temp:   [96.6 °F (35.9 °C)-98.1 °F (36.7 °C)] 96.8 °F (36 °C)  Pulse:  [74-83] 81  Resp:  [15-18] 18  SpO2:  [95 %-100 %] 100 %  BP: (126-200)/() 195/128                          Hemodialysis AV Fistula Left forearm (Active)   Needle Size 15ga 5/4/2019  7:00 AM   Site Assessment Clean;Dry;Intact;No redness;No swelling 5/4/2019  8:00 PM   Patency Present;Thrill;Bruit 5/4/2019  8:00 PM   Status Deaccessed 5/4/2019  8:00 PM   Flows Good 5/4/2019 11:00 AM   Dressing Intervention Removed 5/4/2019 11:00 AM   Dressing Status Clean;Dry;Intact 5/4/2019  8:00 PM   Site Condition No complications 5/4/2019  8:00 PM   Dressing Gauze 5/4/2019  8:00 PM   Drainage Description Other (Comment) 4/14/2018  5:26 PM       Neurosurgery Physical Exam   General: well developed, well nourished, no distress.   Head: normocephalic  Neurologic: Awake, aphasia vs mild confusion  GCS: Motor: 6/Verbal: 4/Eyes: 4 GCS Total: 14  Mental Status: Awake, Alert, Oriented to self only  Language: Aphasic  Speech: No dysarthria  Cranial nerves: face symmetric, tongue midline, CN II-XII grossly intact.   Eyes: pupils equal, round, reactive to light with accomodation, EOMI.   Pulmonary: normal respirations, no signs of respiratory distress  Abdomen: soft, not tender to palpation  Skin: Skin is warm, dry and intact.  Sensory: response to light touch throughout  Motor Strength: Moves all extremities spontaneously with good tone. Some difficulty understanding commands. LUE/LLE 5/5.  RUE 4/5.  RLE 4+/5.  No abnormal movements seen.   Babinski's: Negative.  Clonus: Negative.  Finger-to-nose: right drift  Pronator drift: unable to assess 2/2 mental status      Incision:  Clean, dry, staples intact. Skin edges well approximated. No surrounding erythema or edema. No drainage or TTP.       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 Follow up scan with more punctuate ICH and perilesional edema as well as IV after severe hypertensive episode.     -Patient neurologically stable on exam  -Staples removed without complication. Patient tolerated removal well. OK to get incision wet.  -Continue Keppra and Vimpat for seizure prevention  -Maintain SBP < 160  -OK to relax Plt goal to 60k. Please transfuse PRN  -Please continue to hold any anticoagulation or DVT prophylaxis   -Continue management of multiple medical co morbidities per HM. Appreciate all assistance.   -Follow up with Dr. Powell in 2 weeks with head CT. NSGY will schedule this appt.   -Will sign off. Please reconsult if NSGY can be of any further assistance.         Discussed with Dr. Powell  Please call with any questions      Jeni Becerra PA-C   Neurosurgery   Pager: [phone]

## 2019-05-06 NOTE — PLAN OF CARE
Problem: Occupational Therapy Goal  Goal: Occupational Therapy Goal  Goals to be met by: 5/10(2 weeks from initial evaluation)     Patient will increase functional independence with ADLs by performing:    Pt will follow 95% of simple step commands for functional tasks.   Pt will verbally ID 1/3 items for ADL task with added time. MET  *revised: 3/3 items  UE Dressing with Set-up Assistance and Supervision.  LE Dressing (pants, brief) with Set-up Assistance and Minimal Assistance. Progressing  Grooming while standing with Set-up Assistance and Contact Guard Assistance.  Toileting from toilet with Minimal Assistance for hygiene and clothing management. Met  *revised: with set up and supervision  Toilet transfer to toilet with Minimal Assistance. Met  *revised: with CGA  Functional mobility at short household distance for ADL task with Minimal Assistance. Met  *revised: with CGA      Outcome: Ongoing (interventions implemented as appropriate)  Goals remain appropriate. RENA Horta 5/6/2019

## 2019-05-06 NOTE — PLAN OF CARE
Problem: Adult Inpatient Plan of Care  Goal: Plan of Care Review  Outcome: Ongoing (interventions implemented as appropriate)  Plan of care reviewed with pt. Pt voiced understanding. Pt alert and oriented to self. Pt is aphasic. Pt denies any c/o during the shift. Pt blood pressure systolic range from 200-164. PRN dose of labetalol and hydralazine given.  Pt is going to dialysis today. Fall precautions maintained. Bed in lowest position, and locked. Call light within reach and advised to call for assistance. Side rails x 2 and slip resistant socks on at this time. Will continue to monitor.

## 2019-05-06 NOTE — SUBJECTIVE & OBJECTIVE
Interval History:   NAEON. Patient resting comfortably in bed. No family at bedside. Denies any pain or headaches. Remains aphasic.     Medications:  Continuous Infusions:  Scheduled Meds:   sodium chloride 0.9%   Intravenous Once    bisacodyl  10 mg Rectal Daily    calcitriol  1 mcg Oral BID    calcium carbonate  2,000 mg Oral TID    carvedilol  25 mg Oral BID    cloNIDine  0.3 mg Oral Q8H    famotidine  20 mg Oral Daily    hydrALAZINE  100 mg Oral Q8H    irbesartan  300 mg Oral Daily    lacosamide  50 mg Oral BID    levETIRAcetam  500 mg Oral BID    polyethylene glycol  17 g Oral BID    senna-docusate 8.6-50 mg  2 tablet Oral Daily    sodium chloride  2 g Oral BID    tacrolimus  3 mg Oral Daily    tacrolimus  4 mg Oral Daily    verapamil  240 mg Oral Daily     PRN Meds:sodium chloride, sodium chloride 0.9%, hydrALAZINE, labetalol, ondansetron, oxyCODONE, sodium chloride 0.9%     Review of Systems  Objective:     Weight: 54.8 kg (120 lb 13 oz)  Body mass index is 22.1 kg/m².  Vital Signs (Most Recent):  Temp: 96.8 °F (36 °C) (05/06/19 0806)  Pulse: 81 (05/06/19 0806)  Resp: 18 (05/06/19 0806)  BP: (!) 195/128 (05/06/19 0806)  SpO2: 100 % (05/06/19 0806) Vital Signs (24h Range):  Temp:  [96.6 °F (35.9 °C)-98.1 °F (36.7 °C)] 96.8 °F (36 °C)  Pulse:  [74-83] 81  Resp:  [15-18] 18  SpO2:  [95 %-100 %] 100 %  BP: (126-200)/() 195/128                          Hemodialysis AV Fistula Left forearm (Active)   Needle Size 15ga 5/4/2019  7:00 AM   Site Assessment Clean;Dry;Intact;No redness;No swelling 5/4/2019  8:00 PM   Patency Present;Thrill;Bruit 5/4/2019  8:00 PM   Status Deaccessed 5/4/2019  8:00 PM   Flows Good 5/4/2019 11:00 AM   Dressing Intervention Removed 5/4/2019 11:00 AM   Dressing Status Clean;Dry;Intact 5/4/2019  8:00 PM   Site Condition No complications 5/4/2019  8:00 PM   Dressing Gauze 5/4/2019  8:00 PM   Drainage Description Other (Comment) 4/14/2018  5:26 PM       Neurosurgery  Physical Exam   General: well developed, well nourished, no distress.   Head: normocephalic  Neurologic: Awake, aphasia vs mild confusion  GCS: Motor: 6/Verbal: 4/Eyes: 4 GCS Total: 14  Mental Status: Awake, Alert, Oriented to self only  Language: Aphasic  Speech: No dysarthria  Cranial nerves: face symmetric, tongue midline, CN II-XII grossly intact.   Eyes: pupils equal, round, reactive to light with accomodation, EOMI.   Pulmonary: normal respirations, no signs of respiratory distress  Abdomen: soft, not tender to palpation  Skin: Skin is warm, dry and intact.  Sensory: response to light touch throughout  Motor Strength: Moves all extremities spontaneously with good tone. Some difficulty understanding commands. LUE/LLE 5/5.  RUE 4/5.  RLE 4+/5.  No abnormal movements seen.   Babinski's: Negative.  Clonus: Negative.  Finger-to-nose: right drift  Pronator drift: unable to assess 2/2 mental status      Incision:  Clean, dry, staples intact. Skin edges well approximated. No surrounding erythema or edema. No drainage or TTP.       Significant Labs:  Recent Labs   Lab 05/05/19 0409 05/06/19  0400   *  121* 114*     140 142   K 5.5*  5.5* 5.8*     104 105   CO2 27  27 26   BUN 41*  41* 49*   CREATININE 6.8*  6.8* 7.5*   CALCIUM 8.1*  8.1* 8.5*     Recent Labs   Lab 05/05/19 0409 05/06/19  0400   WBC 3.62* 3.92   HGB 8.2* 7.9*   HCT 25.3* 25.1*   PLT 78* 79*     Recent Labs   Lab 05/05/19 0409 05/06/19  0400   INR 1.2 1.2

## 2019-05-06 NOTE — PLAN OF CARE
SW spoke with [facility] admissions who called to say they are still following her in [facility] but her BP is still too high for them to take her. Advised of new SW number so she can continue to keep her updated.    Shanel Delgadillo, OFELIA  Neurocritical Care   [facility]  [id number]

## 2019-05-06 NOTE — PT/OT/SLP PROGRESS
Occupational Therapy   Treatment    Name: Holly Patel  MRN: [id number]  Admitting Diagnosis:  Nontraumatic subcortical hemorrhage of left cerebral hemisphere  14 Days Post-Op    Recommendations:     Discharge Recommendations: rehabilitation facility  Discharge Equipment Recommendations:  none  Barriers to discharge: safety risk; level of skilled assistance required    Assessment:     Holly Patel is a 28 y.o. female with a medical diagnosis of Nontraumatic subcortical hemorrhage of left cerebral hemisphere.  She cont to remain limited by fatigue for functional tasks/activities. She would greatly benefit from nursing staff mobility (ie: to chair for meals; to bathroom for toileting) for endurance mgmt. Cont to demo aphasia and generalized weakness affecting her functional safety with functional tasks/activities. Performance deficits affecting function are impaired endurance, impaired self care skills, impaired functional mobilty, gait instability, impaired balance, impaired cognition, impaired cardiopulmonary response to activity, other (comment)(aphasia).     Rehab Prognosis:  Good; patient would benefit from acute skilled OT services to address these deficits and reach maximum level of function.       Plan:     Patient to be seen 4 x/week to address the above listed problems via self-care/home management, therapeutic activities, therapeutic exercises, neuromuscular re-education, cognitive retraining  · Plan of Care Expires: 05/25/19  · Plan of Care Reviewed with: patient, mother    Subjective     Pain/Comfort:  · Pain Rating 1: 0/10  · Pain Rating Post-Intervention 1: 0/10    Objective:     Communicated with: RN prior to session. Pt's mother and daughter at bedside throughout.  Patient found HOB elevated with telemetry, bed alarm upon OT entry to room.    General Precautions: Standard, aphasia, aspiration, fall, dental soft, seizure   Orthopedic Precautions:N/A   Braces: N/A     Occupational  Performance:   Bed Mobility:    · Patient completed Supine to Sit with minimum assistance ; HOB flat    Functional Mobility/Transfers:  · Patient completed Sit <> Stand Transfer with contact guard assistance  with  hand-held assist   · Patient completed Bed <> Chair Transfer using Step Transfer technique with minimum assistance with hand-held assist  · Functional Mobility: household distances x2 trials with min(A) and unilateral UE support    Activities of Daily Living:  · Grooming: minimum assistance standing at sink x2.03 min duration; requiring forearm support for endurance  · Application of lotion to B LE and UE with set up and min cues for initiation  · Upper Body Dressing: minimum assistance EOB  · Lower Body Dressing: moderate assistance EOB- to don brief; mod(A) for B socks 2/2 discomfort with abd distension     AMPAC 6 Click ADL: 18    Treatment & Education:  -Pt alert and oriented to person and place; requiring cues for time  -re edu on OT role in care  -Pt able to ID 2/4 items presented for ADL task initially; requiring cues for 4/4 completion   -Communication board updated; questions/concerns addressed within OT scope of practice      Patient left up in chair with all lines intact, call button in reach and family presentEducation:      GOALS:   Multidisciplinary Problems     Occupational Therapy Goals        Problem: Occupational Therapy Goal    Goal Priority Disciplines Outcome Interventions   Occupational Therapy Goal     OT, PT/OT Ongoing (interventions implemented as appropriate)    Description:  Goals to be met by: 5/10(2 weeks from initial evaluation)     Patient will increase functional independence with ADLs by performing:    Pt will follow 95% of simple step commands for functional tasks.   Pt will verbally ID 1/3 items for ADL task with added time. MET  *revised: 3/3 items  UE Dressing with Set-up Assistance and Supervision.  LE Dressing (pants, brief) with Set-up Assistance and Minimal  Assistance. Progressing  Grooming while standing with Set-up Assistance and Contact Guard Assistance.  Toileting from toilet with Minimal Assistance for hygiene and clothing management. Met  *revised: with set up and supervision  Toilet transfer to toilet with Minimal Assistance. Met  *revised: with CGA  Functional mobility at short household distance for ADL task with Minimal Assistance. Met  *revised: with CGA                        Time Tracking:     OT Date of Treatment: 05/06/19  OT Start Time: 1312  OT Stop Time: 1330  OT Total Time (min): 18 min    Billable Minutes:Self Care/Home Management 18    RENA Horta  5/6/2019

## 2019-05-06 NOTE — TREATMENT PLAN
Treatment Plan  05/06/2019  2:01 PM    Chart reviewed and case discussed with attending.     We are following Ms. Granadosnes for IS and recommendations are:  --Daily CMP, CBC, and INR     --Daily Tacrolimus level    --Continue current dose of Tacrolimus 4 mg PO in AM and 3 mg PO in PM    --We will continue to follow alongside primary team (please promptly notify us of discharge in order to arrange for appropriate outpatient follow up).    Elinor Medeiros M.D.  Gastroenterology Fellow, PGY-V  Pager: [phone]  [facility]

## 2019-05-06 NOTE — SUBJECTIVE & OBJECTIVE
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    BIOPSY-LIVER N/A 2017    Performed by [facility]    BIOPSY-LIVER N/A 3/22/2016    Performed by [facility] at [facility] OR [address] [place] [address]     SECTION      x 2    CONIZATION-CERVICAL-LEEP N/A 6/15/2018    Performed by [facility]am [facility]quin [facility] at [facility] OR    OBNMQDZSBDCE-HEBOKHY-IN; upper extremity Left 2015    Performed by Idalia [facility]    CRANIOPLASTY  2019    Performed by [facility] Powell [facility]    CRANIOTOMY-- left crani for clot evac with microscrope Left 2019    Performed by [facility]    EMBOLIZATION, BLOOD VESSEL N/A 2018    Performed by Aren Ramos MD at [facility] CATH LAB    Exam Under Anesthesia N/A 2018    Performed by [facility]    Exam under  anesthesia N/A 6/19/2018    Performed by [facility] at [facility] OR    Exam under anesthesia (ADD ON ) N/A 7/9/2018    Performed by NICKIE Alvarez MD at [facility] OR    Exam under anesthesia -cervical suturing  N/A 7/26/2018    Performed by Lei Sims III, MD at [facility] OR    EXCISION, NEOPLASM, SKULL with Cranioplasty Left 4/22/2019    Performed by [facility] at [facility] OR [facility]    FISTULOGRAM Left 12/4/2015    Performed by Idalia Diaz MD at [facility] CATH LAB    LIVER BIOPSY      LIVER TRANSPLANT  09/1992    PARATHYROIDECTOMY, Minimally Invasive Bilateral Exploration Bilateral 3/27/2019    Performed by Ashley Guallpa MD at [facility] OR [facility]    SUTURE REPAIR,CERVIX  6/19/2018    Performed by [facility]am [facility]roquin MD at [facility] OR    TUBAL LIGATION  2010     Review of patient's allergies indicates:   Allergen Reactions    Chloral hydrate Hallucinations     Other reaction(s): Hallucinations  Other reaction(s): Hives    Hydrocodone Other (See Comments)     Mental status changes    Tolerates oxycodone       Scheduled Medications:    sodium chloride 0.9%   Intravenous Once    bisacodyl  10 mg Rectal Daily    calcitriol  1 mcg Oral BID    calcium carbonate  2,000 mg Oral TID    carvedilol  25 mg Oral BID    cloNIDine  0.3 mg Oral Q8H    famotidine  20 mg Oral Daily    hydrALAZINE  100 mg Oral Q8H    irbesartan  300 mg Oral Daily    lacosamide  50 mg Oral BID    levETIRAcetam  500 mg Oral BID    polyethylene glycol  17 g Oral BID    senna-docusate 8.6-50 mg  2 tablet Oral Daily    sodium chloride  2 g Oral BID    tacrolimus  3 mg Oral Daily    tacrolimus  4 mg Oral Daily    verapamil  240 mg Oral Daily       PRN Medications: sodium chloride, sodium chloride 0.9%, hydrALAZINE, labetalol, ondansetron, oxyCODONE, sodium chloride 0.9%    Family History     Problem Relation (Age of Onset)    Cancer Sister    Heart attack Maternal Uncle    Hypertension Mother, Father        Tobacco  Use    Smoking status: Never Smoker    Smokeless tobacco: Never Used   Substance and Sexual Activity    Alcohol use: No    Drug use: No    Sexual activity: Never     Partners: Male     Review of Systems   Constitutional: Positive for activity change.   HENT: Positive for trouble swallowing.    Respiratory: Negative for cough and shortness of breath.    Cardiovascular: Negative for chest pain and palpitations.   Musculoskeletal: Positive for gait problem.   Neurological: Positive for speech difficulty and weakness.   Psychiatric/Behavioral: Positive for confusion. Negative for agitation.     Objective:     Vital Signs (Most Recent):  Temp: 96.8 °F (36 °C) (05/06/19 0806)  Pulse: 81 (05/06/19 0806)  Resp: 18 (05/06/19 0806)  BP: (!) 195/128 (05/06/19 0806)  SpO2: 100 % (05/06/19 0806)    Vital Signs (24h Range):  Temp:  [96.6 °F (35.9 °C)-98.1 °F (36.7 °C)] 96.8 °F (36 °C)  Pulse:  [74-83] 81  Resp:  [15-18] 18  SpO2:  [95 %-100 %] 100 %  BP: (126-200)/() 195/128     Body mass index is 22.1 kg/m².    Physical Exam   Constitutional: She appears well-developed and well-nourished.   HENT:   Head: Normocephalic and atraumatic.   Neck: Neck supple.   Pulmonary/Chest: Effort normal. No respiratory distress.   Abdominal: Soft. She exhibits no distension.   Neurological: She exhibits abnormal muscle tone.   - Alert in bed  - Following commands  - Decreased muscle strength noted    Skin: Skin is warm and dry.   Psychiatric: Her behavior is normal. Cognition and memory are impaired.   Nursing note and vitals reviewed.         Diagnostic Results: Labs: Reviewed

## 2019-05-06 NOTE — PROGRESS NOTES
Maintenance TTS (off-schedule) dialysis started to LFA fistula with 2-15 gauge needles.  Tolerated well.

## 2019-05-06 NOTE — SUBJECTIVE & OBJECTIVE
Interval History: No acute issues. Potassium still elevated. BPs elevated today. Mother at bedside. Staples removed.     Review of Systems   Constitutional: Positive for fatigue. Negative for chills and fever.   HENT: Negative.    Eyes: Negative for visual disturbance.   Respiratory: Negative for cough, chest tightness and shortness of breath.    Gastrointestinal: Negative for nausea and vomiting.   Musculoskeletal: Negative.    Neurological: Negative for light-headedness, numbness and headaches.     Objective:     Vital Signs (Most Recent):  Temp: 96.7 °F (35.9 °C) (05/06/19 1244)  Pulse: 83 (05/06/19 1244)  Resp: 20 (05/06/19 1244)  BP: 128/87 (05/06/19 1244)  SpO2: 100 % (05/06/19 1244) Vital Signs (24h Range):  Temp:  [96.7 °F (35.9 °C)-98.1 °F (36.7 °C)] 96.7 °F (35.9 °C)  Pulse:  [74-83] 83  Resp:  [15-20] 20  SpO2:  [95 %-100 %] 100 %  BP: (128-200)/() 128/87     Weight: 54.8 kg (120 lb 13 oz)  Body mass index is 22.1 kg/m².    Intake/Output Summary (Last 24 hours) at 5/6/2019 1321  Last data filed at 5/5/2019 1800  Gross per 24 hour   Intake 246.67 ml   Output --   Net 246.67 ml      Physical Exam   Constitutional: She is oriented to person, place, and time. She appears well-developed and well-nourished. No distress.   HENT:   Head: Normocephalic.   Staples removed.    Neck: Neck supple.   Cardiovascular: Normal rate and regular rhythm.   Pulmonary/Chest: Effort normal and breath sounds normal.   Abdominal: There is no tenderness.   Distended but not tense.    Neurological: She is alert and oriented to person, place, and time.   Skin: Skin is warm. No erythema.   Vitals reviewed.      Significant Labs: All pertinent labs within the past 24 hours have been reviewed.    Significant Imaging: I have reviewed all pertinent imaging results/findings within the past 24 hours.

## 2019-05-06 NOTE — PT/OT/SLP PROGRESS
Physical Therapy      Patient Name:  Holly Patel   MRN:  [id number]    Pt off unit for dialysis. PT to attempt visit on following day as appropriate.    Shama Gant, PT, DPT  5/6/2019

## 2019-05-06 NOTE — PROGRESS NOTES
[facility] Hospital Medicine  Progress Note    Patient Name: Holly Patel  MRN: [id number]  Patient Class: IP- Inpatient   Admission Date: 4/22/2019  Length of Stay: 14 days  Attending Physician: Zeenat Almanza MD  Primary Care Provider: Stan Sosa MD    [facility] Medicine Team: [facility] MED G Zeenat Almanza MD    Subjective:     Principal Problem:Nontraumatic subcortical hemorrhage of left cerebral hemisphere    HPI:  No notes on file    Hospital Course:  No notes on file    Interval History: No acute issues. Potassium still elevated. BPs elevated today. Mother at bedside. Staples removed.     Review of Systems   Constitutional: Positive for fatigue. Negative for chills and fever.   HENT: Negative.    Eyes: Negative for visual disturbance.   Respiratory: Negative for cough, chest tightness and shortness of breath.    Gastrointestinal: Negative for nausea and vomiting.   Musculoskeletal: Negative.    Neurological: Negative for light-headedness, numbness and headaches.     Objective:     Vital Signs (Most Recent):  Temp: 96.7 °F (35.9 °C) (05/06/19 1244)  Pulse: 83 (05/06/19 1244)  Resp: 20 (05/06/19 1244)  BP: 128/87 (05/06/19 1244)  SpO2: 100 % (05/06/19 1244) Vital Signs (24h Range):  Temp:  [96.7 °F (35.9 °C)-98.1 °F (36.7 °C)] 96.7 °F (35.9 °C)  Pulse:  [74-83] 83  Resp:  [15-20] 20  SpO2:  [95 %-100 %] 100 %  BP: (128-200)/() 128/87     Weight: 54.8 kg (120 lb 13 oz)  Body mass index is 22.1 kg/m².    Intake/Output Summary (Last 24 hours) at 5/6/2019 1321  Last data filed at 5/5/2019 1800  Gross per 24 hour   Intake 246.67 ml   Output --   Net 246.67 ml      Physical Exam   Constitutional: She is oriented to person, place, and time. She appears well-developed and well-nourished. No distress.   HENT:   Head: Normocephalic.   Staples removed.    Neck: Neck supple.   Cardiovascular: Normal rate and regular rhythm.   Pulmonary/Chest: Effort normal and breath sounds normal.    Abdominal: There is no tenderness.   Distended but not tense.    Neurological: She is alert and oriented to person, place, and time.   Skin: Skin is warm. No erythema.   Vitals reviewed.      Significant Labs: All pertinent labs within the past 24 hours have been reviewed.    Significant Imaging: I have reviewed all pertinent imaging results/findings within the past 24 hours.    Assessment/Plan:      * Nontraumatic subcortical hemorrhage of left cerebral hemisphere  S/P clot evacuation and resection of L temporal lesion with cranioplasty on 4/22  Neurosurgery following with recs as per below:    Patient neurologically stable on exam  -Staples removed without complication. Patient tolerated removal well. OK to get incision wet.  -Continue Keppra and Vimpat for seizure prevention  -Maintain SBP < 160  -OK to relax Plt goal to 60k. Please transfuse PRN  -Please continue to hold any anticoagulation or DVT prophylaxis   -Follow up with Dr. Powell in 2 weeks with head CT. NSGY will schedule this appt.    Per NS, an additional dose of keppra 500 mg to be given after HD on HD days  PTOT    Brain tumor  Per above       Hypocalcemia  Corrected Ca still low  Continue calcium carbonate and calcitriol  HD as scheduled      Thrombocytopenia  Transfusing as per ICH      Immunosuppressed  Per liver transplant       Renovascular hypertension  BPs elevated today but will receive HD   Continue HD as scheduled  Continue verapamil, irbesartan, clonidine, hydralazine, and carvedilol      ESRD on hemodialysis  HD as scheduled  Continue calcitriol     Liver replaced by transplant  Appreciate hepatology consult   Continue Tacrolimus 4 mg PO in AM and 3 mg PO in PM            VTE Risk Mitigation (From admission, onward)        Ordered     IP VTE HIGH RISK PATIENT  Once      04/22/19 1137     Place sequential compression device  Until discontinued      04/22/19 1137              Zeenat Almanza MD  Department of Hospital Medicine   [facility]

## 2019-05-06 NOTE — ASSESSMENT & PLAN NOTE
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 Follow up scan with more punctuate ICH and perilesional edema as well as IV after severe hypertensive episode.     -Patient neurologically stable on exam  -Staples removed without complication. Patient tolerated removal well. OK to get incision wet.  -Continue Keppra and Vimpat for seizure prevention  -Maintain SBP < 160  -OK to relax Plt goal to 60k. Please transfuse PRN  -Please continue to hold any anticoagulation or DVT prophylaxis   -Continue management of multiple medical co morbidities per HM. Appreciate all assistance.   -Follow up with Dr. Powell in 2 weeks with head CT. NSGY will schedule this appt.   -Will sign off. Please reconsult if NSGY can be of any further assistance.

## 2019-05-06 NOTE — PT/OT/SLP PROGRESS
"Speech Language Pathology Treatment    Patient Name:  Holly Patel   MRN:  [id number]  Admitting Diagnosis: Nontraumatic subcortical hemorrhage of left cerebral hemisphere    Recommendations:                 General Recommendations:  Speech/language therapy  Diet recommendations:  Dental Soft, Liquid Diet Level: Thin   Aspiration Precautions: 1 bite/sip at a time, Alternating bites/sips, Avoid talking while eating, Eliminate distractions, Feed only when awake/alert, HOB to 90 degrees, Monitor for s/s of aspiration, Small bites/sips and Standard aspiration precautions   General Precautions: Standard, aphasia, aspiration, fall, seizure, dental soft  Communication strategies:  go to room if call light pushed; pt with aphasia    Subjective     "Can you call my mama" p    Pain/Comfort:   no pain pre/post     Objective:     Has the patient been evaluated by SLP for swallowing?   Yes  Keep patient NPO? No   Current Respiratory Status: room air      Pt remains oriented to self and . Pt oriented to location with SLP mod semantic cueing. SLP reviewed situation with pt who responded "Oh I'm sick." Pt continued to report "5" for month while reading whiteboard but unable to name correct month associated with numerical value. Pt completed responsive naming opposites task w/ 50% acc independently. Pt completed category inclusion tasks with 60% acc pt unable to independently generate additional item per category warranting max SLP cueing.Pt verbal responses continue to be signifciant for semantic paraphasias. Pt unaware of errored speech productions. Ongoing skilled speech services warranted.     Assessment:     Holly Patel is a 28 y.o. female with an SLP diagnosis of Aphasia and Cognitive-Linguistic Impairment.      Goals:   Multidisciplinary Problems     SLP Goals        Problem: SLP Goal    Goal Priority Disciplines Outcome   SLP Goal     SLP Ongoing (interventions implemented as appropriate)   Description:  " Revised goals to be met 5/9  1. Pt will tolerate diet of thin liquids and dental soft solids without overt clinical signs of aspiration   2. Pt will participate in trials of regular solids within speech therapy sessions to help determine least restrictive diet   3. Pt will demonstrate orientation to place, situation , family members, date w/ mod-max cues   4. Pt will generate 2 items per category w/ mod cues to enhance organizations skills   5. Pt will label items w/ 60%acc w/ mod cues to enhance verbal expression skills   6. Pt will participate in ongoing assessment of speech language and cognitive linguistic skills to help rule out deficits and determine therapeutic plan of care                            Plan:     · Patient to be seen:  4 x/week   · Plan of Care expires:     · Plan of Care reviewed with:  patient, mother   · SLP Follow-Up:  Yes       Discharge recommendations:  rehabilitation facility     Time Tracking:     SLP Treatment Date:   05/06/19  Speech Start Time:  0820  Speech Stop Time:  0836     Speech Total Time (min):  16 min    Billable Minutes: Speech Therapy Individual 16    Josselyn Medeiros CCC-SLP  05/06/2019 5/6/2019

## 2019-05-07 NOTE — SUBJECTIVE & OBJECTIVE
Interval History: BP still elevated despite receiving HD on 5/6. Denies any symptoms.     Review of Systems   Constitutional: Negative for chills and fever.   HENT: Negative.    Eyes: Negative for visual disturbance.   Respiratory: Negative for cough, chest tightness and shortness of breath.    Gastrointestinal: Negative for nausea and vomiting.   Musculoskeletal: Negative.    Neurological: Negative for light-headedness, numbness and headaches.     Objective:     Vital Signs (Most Recent):  Temp: 97.6 °F (36.4 °C) (05/07/19 0530)  Pulse: 74 (05/07/19 1131)  Resp: 18 (05/07/19 0530)  BP: (!) 183/110 (05/07/19 1321)  SpO2: 97 % (05/07/19 0530) Vital Signs (24h Range):  Temp:  [97.5 °F (36.4 °C)-98.2 °F (36.8 °C)] 97.6 °F (36.4 °C)  Pulse:  [70-78] 74  Resp:  [18] 18  SpO2:  [97 %-100 %] 97 %  BP: (100-200)/() 183/110     Weight: 54.8 kg (120 lb 13 oz)  Body mass index is 22.1 kg/m².    Intake/Output Summary (Last 24 hours) at 5/7/2019 1328  Last data filed at 5/6/2019 1835  Gross per 24 hour   Intake 800 ml   Output 3150 ml   Net -2350 ml      Physical Exam   Constitutional: She is oriented to person, place, and time. She appears well-developed and well-nourished. No distress.   HENT:   Head: Normocephalic.   Staples removed.    Neck: Neck supple.   Cardiovascular: Normal rate and regular rhythm.   Pulmonary/Chest: Effort normal and breath sounds normal.   Abdominal: There is no tenderness.   Distended but not tense.    Neurological: She is alert and oriented to person, place, and time.   Skin: Skin is warm. No erythema.   Vitals reviewed.      Significant Labs: All pertinent labs within the past 24 hours have been reviewed.    Significant Imaging: I have reviewed all pertinent imaging results/findings within the past 24 hours.

## 2019-05-07 NOTE — ASSESSMENT & PLAN NOTE
BPs still elevated  Continue HD as scheduled  Continue verapamil, irbesartan, clonidine, hydralazine, and carvedilol  Add imdur

## 2019-05-07 NOTE — PLAN OF CARE
Problem: SLP Goal  Goal: SLP Goal  Revised goals to be met 5/9  1. Pt will tolerate diet of thin liquids and dental soft solids without overt clinical signs of aspiration   2. Pt will participate in trials of regular solids within speech therapy sessions to help determine least restrictive diet   3. Pt will demonstrate orientation to place, situation , family members, date w/ mod-max cues   4. Pt will generate 2 items per category w/ mod cues to enhance organizations skills   5. Pt will label items w/ 60%acc w/ mod cues to enhance verbal expression skills   6. Pt will participate in ongoing assessment of speech language and cognitive linguistic skills to help rule out deficits and determine therapeutic plan of care             Pt with slow progress towards goals     Josselyn Medeiros MS, CCC-SLP  Speech Language Pathologist  Pager: [phone]  Date 5/7/2019

## 2019-05-07 NOTE — ASSESSMENT & PLAN NOTE
-primary team managing  -on verapamil, irbesartan, clonidine, hydralazine, and carvedilol  -also on HD

## 2019-05-07 NOTE — PROGRESS NOTES
Dialysis complete.  Blood rinsed back.  Manito pulled from LFA fistula.  Pressure held x 5 minutes.  Hemostasis achieved.  Covered with gauze and paper tape.  Net UF 2.5L.  Tolerated well.  Transported from dialysis unit to room 7077 via stretcher by transporter.

## 2019-05-07 NOTE — PROGRESS NOTES
[facility] Hospital Medicine  Progress Note    Patient Name: Holly Patel  MRN: [id number]  Patient Class: IP- Inpatient   Admission Date: 4/22/2019  Length of Stay: 15 days  Attending Physician: Zeenat Almanza MD  Primary Care Provider: Stan Sosa MD    [facility] Medicine Team: [facility]OSP MED G Zeenat Almanza MD    Subjective:     Principal Problem:Nontraumatic subcortical hemorrhage of left cerebral hemisphere    HPI:  No notes on file    Hospital Course:  No notes on file    Interval History: BP still elevated despite receiving HD on 5/6. Denies any symptoms.     Review of Systems   Constitutional: Negative for chills and fever.   HENT: Negative.    Eyes: Negative for visual disturbance.   Respiratory: Negative for cough, chest tightness and shortness of breath.    Gastrointestinal: Negative for nausea and vomiting.   Musculoskeletal: Negative.    Neurological: Negative for light-headedness, numbness and headaches.     Objective:     Vital Signs (Most Recent):  Temp: 97.6 °F (36.4 °C) (05/07/19 0530)  Pulse: 74 (05/07/19 1131)  Resp: 18 (05/07/19 0530)  BP: (!) 183/110 (05/07/19 1321)  SpO2: 97 % (05/07/19 0530) Vital Signs (24h Range):  Temp:  [97.5 °F (36.4 °C)-98.2 °F (36.8 °C)] 97.6 °F (36.4 °C)  Pulse:  [70-78] 74  Resp:  [18] 18  SpO2:  [97 %-100 %] 97 %  BP: (100-200)/() 183/110     Weight: 54.8 kg (120 lb 13 oz)  Body mass index is 22.1 kg/m².    Intake/Output Summary (Last 24 hours) at 5/7/2019 1328  Last data filed at 5/6/2019 1835  Gross per 24 hour   Intake 800 ml   Output 3150 ml   Net -2350 ml      Physical Exam   Constitutional: She is oriented to person, place, and time. She appears well-developed and well-nourished. No distress.   HENT:   Head: Normocephalic.   Staples removed.    Neck: Neck supple.   Cardiovascular: Normal rate and regular rhythm.   Pulmonary/Chest: Effort normal and breath sounds normal.   Abdominal: There is no tenderness.   Distended but  not tense.    Neurological: She is alert and oriented to person, place, and time.   Skin: Skin is warm. No erythema.   Vitals reviewed.      Significant Labs: All pertinent labs within the past 24 hours have been reviewed.    Significant Imaging: I have reviewed all pertinent imaging results/findings within the past 24 hours.    Assessment/Plan:      * Nontraumatic subcortical hemorrhage of left cerebral hemisphere  S/P clot evacuation and resection of L temporal lesion with cranioplasty on 4/22  Neurosurgery following with recs as per below:    Patient neurologically stable on exam  -Staples removed without complication. Patient tolerated removal well. OK to get incision wet.  -Continue Keppra and Vimpat for seizure prevention  -Maintain SBP < 160  -OK to relax Plt goal to 60k. Please transfuse PRN  -Please continue to hold any anticoagulation or DVT prophylaxis   -Follow up with Dr. Powell in 2 weeks with head CT. NSGY will schedule this appt.    Per NS, an additional dose of keppra 500 mg to be given after HD on HD days  PTOT    Brain tumor  Per above       Hypocalcemia  Corrected Ca still low  Continue calcium carbonate and calcitriol  HD as scheduled      Thrombocytopenia  Transfusing as per ICH      Immunosuppressed  Per liver transplant       Renovascular hypertension  BPs still elevated  Continue HD as scheduled  Continue verapamil, irbesartan, clonidine, hydralazine, and carvedilol  Add imdur       ESRD on hemodialysis  HD as scheduled  Continue calcitriol     Liver replaced by transplant  Appreciate hepatology consult   Continue Tacrolimus 4 mg PO in AM and 3 mg PO in PM            VTE Risk Mitigation (From admission, onward)        Ordered     IP VTE HIGH RISK PATIENT  Once      04/22/19 1137     Place sequential compression device  Until discontinued      04/22/19 1137              Zeenat Almanza MD  Department of Hospital Medicine   [facility]

## 2019-05-07 NOTE — PLAN OF CARE
Problem: Physical Therapy Goal  Goal: Physical Therapy Goal  Goals to be met by: 19    Patient will increase functional independence with mobility by performin. Supine to sit with Set-up [place]  2. Sit to supine with Set-up [place]  3. Sit to stand transfer with Supervision  4. Gait  x 200 feet with Stand By Assistance using no assistive device  5. Lower extremity exercise program x30 reps per handout, with independence to improve muscular strength and endurance.        Outcome: Ongoing (interventions implemented as appropriate)  Goals reviewed and remain appropriate. Pt progressing towards goals.    Adriane Gooden, PT, DPT   2019  [phone]

## 2019-05-07 NOTE — PT/OT/SLP PROGRESS
Physical Therapy Treatment    Patient Name:  Holly Patel   MRN:  [id number]    Recommendations:     Discharge Recommendations:  rehabilitation facility   Discharge Equipment Recommendations: none   Barriers to discharge: Decreased caregiver support    Assessment:     Holly Patel is a 28 y.o. female admitted with a medical diagnosis of Nontraumatic subcortical hemorrhage of left cerebral hemisphere.  She presents with the following impairments/functional limitations:  weakness, impaired functional mobilty, impaired balance, impaired endurance, impaired self care skills, gait instability, decreased coordination, decreased safety awareness, impaired cognition. Pt progressing functional mobility, as she was able to increase gait distance and performed mobility with less assist this date. However, pt continues to demo gait instability, requiring RW and CGA for improved safety and stability. Pt noted to be inattentive and easily distracted during session, especially when in open environment (hallway), limiting pt ability to safely complete mobility (I). Further therapy limited 2* impaired endurance, fatigue, and back pain. Pt would continue to benefit from skilled acute PT in order to address current deficits and progress functional mobility.     Rehab Prognosis: Good; patient would benefit from acute skilled PT services to address these deficits and reach maximum level of function.    Recent Surgery: Procedure(s) (LRB):  CRANIOTOMY-- left crani for clot evac with microscrope (Left) 15 Days Post-Op    Plan:     During this hospitalization, patient to be seen 3 x/week to address the identified rehab impairments via gait training, therapeutic activities, therapeutic exercises, neuromuscular re-education and progress toward the following goals:    · Plan of Care Expires:  05/28/19    Subjective     Chief Complaint: fatigue; back pain following gait   Pain/Comfort:  · Pain Rating 1: (reported back pain  following gait)  · Pain Addressed 1: Reposition, Distraction, Cessation of Activity, Nurse notified(heat packs applied)      Objective:     Communicated with RN prior to session.  Patient found supine with telemetry upon PT entry to room.     General Precautions: Standard, aspiration, fall, seizure, dental soft   Orthopedic Precautions:N/A   Braces: N/A     Functional Mobility:  · Bed Mobility:     · Supine to Sit: supervision  · Sit to Supine: supervision  · Transfers:     · Sit to Stand:  stand by assistance with no AD x1 rep and with RW x2 reps  · Gait: 44 ft. + 46 ft. with RW and CGA  · W/c follow throughout with seated rest between gait trials 2* impaired endurance with fatigue   · demo'd decreased mckay, decreased step length, impaired weight-shifting ability, decreased toe-floor clearance, mild gait instability   · Attempted dual tasking during gait with additional tasks given to complete while ambulating; however, pt unable to perform 2* distraction and inattention   · Increased inattention noted when ambulation in open environment (hallway)   · Reported increased fatigue and back pain following gait       AM-PAC 6 CLICK MOBILITY  Turning over in bed (including adjusting bedclothes, sheets and blankets)?: 3  Sitting down on and standing up from a chair with arms (e.g., wheelchair, bedside commode, etc.): 3  Moving from lying on back to sitting on the side of the bed?: 3  Moving to and from a bed to a chair (including a wheelchair)?: 3  Need to walk in hospital room?: 3  Climbing 3-5 steps with a railing?: 2  Basic Mobility Total Score: 17        Therapeutic Activities and Exercises:  Completed functional mobility as described above.   Reviewed PT POC and importance of progressing mobility. Pt v/u.   Heat packs applied to low back following gait for pain relief. RN notified.    Patient left supine with all lines intact, call button in reach and pt's mother present. Encouraged pt to transfer to bedside chair  with nursing assist later this date; pt v/u.    GOALS:   Multidisciplinary Problems     Physical Therapy Goals        Problem: Physical Therapy Goal    Goal Priority Disciplines Outcome Goal Variances Interventions   Physical Therapy Goal     PT, PT/OT Ongoing (interventions implemented as appropriate)     Description:  Goals to be met by: 19    Patient will increase functional independence with mobility by performin. Supine to sit with Set-up [place]  2. Sit to supine with Set-up [place]  3. Sit to stand transfer with Supervision  4. Gait  x 200 feet with Stand By Assistance using no assistive device  5. Lower extremity exercise program x30 reps per handout, with independence to improve muscular strength and endurance.                          Time Tracking:     PT Received On: 19  PT Start Time: 1336     PT Stop Time: 1402  PT Total Time (min): 26 min     Billable Minutes: Gait Training 16 and Therapeutic Activity 10    Treatment Type: Treatment  PT/PTA: PT     PTA Visit Number: 0     Adriane Gooden, PT, DPT   2019

## 2019-05-07 NOTE — PROGRESS NOTES
[facility]  Physical Medicine & Rehab  Progress Note    Patient Name: Holly Patel  MRN: [id number]  Admission Date: 4/22/2019  Length of Stay: 15 days  Attending Physician: Zeenat Almanza MD    Subjective:     Principal Problem:Nontraumatic subcortical hemorrhage of left cerebral hemisphere    Hospital Course:   04/26/2019: Participated with OT.  Bed mobility CGA-Maddy.  Sit to stand and transfers Maddy.  Ambulated  4 steps Maddy with mild dizziness.  UBD/grooming Maddy and Feeding CGA.  04/29/2019: Participated with PT.  Bed mobility Maddy.  Sit to stand and transfers Maddy.  Ambulated 5 steps Maddy. Passed bedside swallow evaluation.  SLP recommending dental soft diet and thin liquids. SLP diagnosis of Aphasia and Cognitive-Linguistic Impairment.   04/30/2019: Participated with therapy.  Bed mobility CGA-Maddy.  Sit to stand CGA and transfers Maddy.  Ambulated 12 ft and 12 ft Maddy-CGA. No grooming.   5/1/19: Participated w/ OT. Static standing CGA. Endurance exercises x 5 sit to stand CGA. Declined adls.   05/02/2019: Participated with therapy.  Bed mobility Maddy.  Sit to stand Maddy.  Ambulated 20 ft x 2 trials Maddy & RW.    05/03/2019: Participated with therapy.  Bed mobility Maddy.  Sit to stand CGA.  LBD/grooming Maddy.  05/06/2019: Participated with therapy.  Bed mobility Maddy .  Sit to stand CGA and transfers Maddy.  UBD/grooming Maddy and LBD ModA.    Interval History 5/7/2019:  Patient is seen for follow-up rehab evaluation and recommendations: Participating with therapy.     HPI, Past Medical, Family, and Social History remains the same as documented in the initial encounter.    Scheduled Medications:    bisacodyl  10 mg Rectal Daily    calcitriol  1 mcg Oral BID    calcium carbonate  2,000 mg Oral TID    carvedilol  25 mg Oral BID    cloNIDine  0.3 mg Oral Q8H    [START ON 5/8/2019] epoetin anca-ebpx (RETACRIT) injection  3,000 Units Intravenous Every Mon, Wed, Fri    famotidine   20 mg Oral Daily    hydrALAZINE  100 mg Oral Q8H    irbesartan  300 mg Oral Daily    lacosamide  50 mg Oral BID    levETIRAcetam  500 mg Oral BID    polyethylene glycol  17 g Oral BID    senna-docusate 8.6-50 mg  2 tablet Oral Daily    sodium chloride  2 g Oral BID    tacrolimus  3 mg Oral Daily    tacrolimus  4 mg Oral Daily    verapamil  240 mg Oral Daily       Diagnostic Results: Labs: Reviewed    PRN Medications: sodium chloride, sodium chloride 0.9%, hydrALAZINE, labetalol, ondansetron, oxyCODONE, sodium chloride 0.9%    Review of Systems   Constitutional: Positive for activity change.   HENT: Positive for trouble swallowing.    Respiratory: Negative for cough and shortness of breath.    Cardiovascular: Negative for chest pain and palpitations.   Musculoskeletal: Positive for gait problem.   Neurological: Positive for speech difficulty and weakness.   Psychiatric/Behavioral: Positive for confusion. Negative for agitation.     Objective:     Vital Signs (Most Recent):  Temp: 97.6 °F (36.4 °C) (05/07/19 0530)  Pulse: 78 (05/07/19 0939)  Resp: 18 (05/07/19 0530)  BP: (!) 171/112(after 9am bp med administration) (05/07/19 0939)  SpO2: 97 % (05/07/19 0530)    Vital Signs (24h Range):  Temp:  [96.7 °F (35.9 °C)-98.2 °F (36.8 °C)] 97.6 °F (36.4 °C)  Pulse:  [70-83] 78  Resp:  [18-20] 18  SpO2:  [97 %-100 %] 97 %  BP: (100-190)/() 171/112     Physical Exam   Constitutional: She appears well-developed and well-nourished. No distress.   HENT:   Head: Normocephalic and atraumatic.   Eyes: Right eye exhibits no discharge. Left eye exhibits no discharge.   Neck: Neck supple.   Cardiovascular: Intact distal pulses.   Pulmonary/Chest: Effort normal. No respiratory distress.   Abdominal: Soft. She exhibits no distension.   Neurological: She exhibits abnormal muscle tone.   - Alert in bed  - Following commands  - Decreased muscle strength noted    Skin: Skin is warm and dry.   Psychiatric: Her behavior is  normal. Cognition and memory are impaired.   Nursing note and vitals reviewed.    Assessment/Plan:      * Nontraumatic subcortical hemorrhage of left cerebral hemisphere  -S/p urgent left crani for clot evacuation  -NSGY following  -per pathology report, intraosseous hemangioma     See hospital course for functional, cognitive/speech/language, and nutrition/swallow status.      Recommendations  -  Encourage mobility, OOB in chair at least 3 hours per day, and early ambulation as appropriate   -  PT/OT evaluate and treat  -  SLP speech and cognitive evaluate and treat  -  Monitor sleep disturbances and establish consistent sleep-wake cycle  -  Monitor for bowel and bladder dysfunction  -  Monitor for shoulder pain, subluxation, & spasticity  -  Monitor for and prevent skin breakdown and pressure ulcers  · Early mobility, repositioning/weight shifting every 20-30 minutes when sitting, turn patient every 2 hours, proper mattress/overlay and chair cushioning, pressure relief/heel protector boots  -  DVT prophylaxis (if appropriate)  -  Reviewed discharge options (IP rehab, SNF, HH therapy, and OP therapy)    Brain tumor  -see Nontraumatic subcortical hemorrhage of left cerebral hemisphere    Hypocalcemia  -nephrology managing     Thrombocytopenia  -s/p 1 unit of plts    Renovascular hypertension  -primary team managing  -on verapamil, irbesartan, clonidine, hydralazine, and carvedilol  -also on HD     ESRD on hemodialysis  -nephrology following    Liver replaced by transplant  -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       Odessa Murrell NP  Department of Physical Medicine & Rehab   [facility]

## 2019-05-07 NOTE — PT/OT/SLP PROGRESS
Occupational Therapy   Treatment    Name: Holly Patel  MRN: [id number]  Admitting Diagnosis:  Nontraumatic subcortical hemorrhage of left cerebral hemisphere  15 Days Post-Op    Recommendations:     Discharge Recommendations: rehabilitation facility  Discharge Equipment Recommendations:  none  Barriers to discharge:  Other (Comment)(safety risk; level of skilled assistance required; remains in ICU)    Assessment:     Holly Patel is a 28 y.o. female with a medical diagnosis of Nontraumatic subcortical hemorrhage of left cerebral hemisphere.  She presents with aphasia and endurance being greatest barrier for functional indep at this time. She demo good family support and would greatly benefit from rehab at post acute level of care. Performance deficits affecting function are impaired endurance, impaired self care skills, impaired functional mobilty, gait instability, impaired balance, impaired cognition, impaired cardiopulmonary response to activity, other (comment)(aphasia).     Rehab Prognosis:  Good; patient would benefit from acute skilled OT services to address these deficits and reach maximum level of function.       Plan:     Patient to be seen 4 x/week to address the above listed problems via self-care/home management, therapeutic activities, therapeutic exercises, neuromuscular re-education, cognitive retraining  · Plan of Care Expires: 05/25/19  · Plan of Care Reviewed with: patient, mother, family    Subjective     Pain/Comfort:  · Pain Rating 1: (c/o back pain with prolonged standing)  · Pain Addressed 1: Reposition, Distraction  · Pain Rating Post-Intervention 1: (remains)    Objective:     Communicated with: RN prior to session.  Patient found HOB elevated with telemetry, bed alarm upon OT entry to room.    General Precautions: Standard, aphasia, aspiration, fall, dental soft, seizure   Orthopedic Precautions:N/A   Braces: N/A     Occupational Performance:   Bed Mobility:    · Patient  completed Supine to Sit with supervision and with side rail     Functional Mobility/Transfers:  · Patient completed Sit <> Stand Transfer with stand by assistance  with  no assistive device   · Functional Mobility: CGA for steps to/from tray table    [facility] 6 Click ADL: 18   Body mass index is 22.1 kg/m².  Vitals:    05/07/19 1321   BP: (!) 183/110   Pulse:    Resp:    Temp:        Treatment & Education:  -Pt declined ADLs/self care tasks 2/2 completion with mother and RN this morning  -Pt able to report verbal orientation to person, place, month, situation   -Following simple step commands with visual cue for functional tasks  -completed standing task for cognition:  *c/o back pain and request for spine at 1.26 min ; able to follow up with activity for 5.01 min duration //B forearm propping for tasks for 90% of standing   *completed sorting task with 3/3 completion with added time; completed 3/3 color ID; max difficulty with reading completion   *completed ID of items with ~25% accuracy with 0 added cues; ~50% with min cues   -Communication board updated; questions/concerns addressed within OT scope of practice  -edu on elimination of external auditory input and allowing added time for processing and speech      Patient left seated EOB with all lines intact, call button in reach and family presentEducation:      GOALS:   Multidisciplinary Problems     Occupational Therapy Goals        Problem: Occupational Therapy Goal    Goal Priority Disciplines Outcome Interventions   Occupational Therapy Goal     OT, PT/OT Ongoing (interventions implemented as appropriate)    Description:  Goals to be met by: 5/10(2 weeks from initial evaluation)     Patient will increase functional independence with ADLs by performing:    Pt will follow 95% of simple step commands for functional tasks.   Pt will verbally ID 1/3 items for ADL task with added time. MET  *revised: 3/3 items  UE Dressing with Set-up Assistance and Supervision.  MAICO  Dressing (pants, brief) with Set-up Assistance and Minimal Assistance. Progressing  Grooming while standing with Set-up Assistance and Contact Guard Assistance.  Toileting from toilet with Minimal Assistance for hygiene and clothing management. Met  *revised: with set up and supervision  Toilet transfer to toilet with Minimal Assistance. Met  *revised: with CGA  Functional mobility at short household distance for ADL task with Minimal Assistance. Met  *revised: with CGA                        Time Tracking:     OT Date of Treatment: 05/07/19  OT Start Time: 1405  OT Stop Time: 1430  OT Total Time (min): 25 min    Billable Minutes:Therapeutic Activity 23    RENA Horta  5/7/2019

## 2019-05-07 NOTE — PHYSICIAN QUERY
PT Name: Holly Patel  MR #: [id number]    Physician Query Form - Pathology Findings Clarification     CDS/: Jessie ROBERTSON, RN              Contact information: [email or web address]  This form is a permanent document in the medical record.     Query Date: May 7, 2019      By submitting this query, we are merely seeking further clarification of documentation.  Please utilize your independent clinical judgment when addressing the question(s) below.      The medical record contains the following:     Findings Supporting Clinical Information Location in Medical Record     Intraosseous Hemangioma   FINAL DIAGNOSIS     SKULL, LESION, EXCISION      INTRAOSSEOUS HEMANGIOMA    Given the paucity of giant cells within the lesion, a brown tumor of hyperthyroidism is excluded.      POSTOPERATIVE DIAGNOSES:  1.  Lytic bone lesion associated with a mass in the left inferior parietal region.    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     Final Pathology Report 5/3/19                Neurosurgery Op note 4/22/19        Neurosurgery Progress 4/28/19     Please document the clinical significance of the Pathologists findings of _____Intraosseous Hemangioma_____.    [   ] I agree with the Pathology Findings   [   ] I do not agree with the Pathology Findings   [   ] Other/Clarification of Findings:   [   ] Clinically Insignificant   [ x ] Clinically Undetermined       Please document in your progress notes daily for the duration of treatment until resolved and include in your discharge summary.

## 2019-05-07 NOTE — PT/OT/SLP PROGRESS
"Speech Language Pathology Treatment    Patient Name:  Holly Patel   MRN:  [id number]  Admitting Diagnosis: Nontraumatic subcortical hemorrhage of left cerebral hemisphere    Recommendations:                 General Recommendations:  Speech/language therapy  Diet recommendations:  Dental Soft, Liquid Diet Level: Thin   Aspiration Precautions: 1 bite/sip at a time, Alternating bites/sips, Avoid talking while eating, Eliminate distractions, Feed only when awake/alert, HOB to 90 degrees, Monitor for s/s of aspiration, Small bites/sips and Standard aspiration precautions   General Precautions: Standard, aphasia, aspiration, fall, seizure, dental soft  Communication strategies:  go to room if call light pushed; pt with aphasia    Subjective     "I like cereal"   Mother at the bedside    Pain/Comfort:  Pain Rating 1: 0/10  Pain Rating Post-Intervention 1: 0/10no pain pre/post     Objective:     Has the patient been evaluated by SLP for swallowing?   Yes  Keep patient NPO? No   Current Respiratory Status: room air      Pt remains oriented to self and . Pt continues to warrant mod-max cueing to orient to date and continues to perseverate on numerical value for month vs. Word for month. Pt completed [place] autospeech tasks with  completed responsive naming opposites task w/ 60% acc with SLP model/in unison. Pt completed 3 item category inclusion tasks with 30% acc with SLP mod-max cueing. Pt able to generate 1 item independently appropriately warranting max SLP scaffolding verbal cues from semantic description, phonemic cue and bilateral choice to name additional items. Pt followed 2 step sequential directions with 25% acc with SLP mod cues. Pt attention continues to be limiting factor for ability to participate in speech tasks and she exhibits frequent perseveration on tasks and words. Pt verbal responses continue to be signifciant for semantic paraphasias. Pt unaware of errored speech productions. Ongoing skilled " speech services warranted.     Assessment:     Holly Patel is a 28 y.o. female with an SLP diagnosis of Aphasia and Cognitive-Linguistic Impairment.      Goals:   Multidisciplinary Problems     SLP Goals        Problem: SLP Goal    Goal Priority Disciplines Outcome   SLP Goal     SLP Ongoing (interventions implemented as appropriate)   Description:  Revised goals to be met 5/9  1. Pt will tolerate diet of thin liquids and dental soft solids without overt clinical signs of aspiration   2. Pt will participate in trials of regular solids within speech therapy sessions to help determine least restrictive diet   3. Pt will demonstrate orientation to place, situation , family members, date w/ mod-max cues   4. Pt will generate 2 items per category w/ mod cues to enhance organizations skills   5. Pt will label items w/ 60%acc w/ mod cues to enhance verbal expression skills   6. Pt will participate in ongoing assessment of speech language and cognitive linguistic skills to help rule out deficits and determine therapeutic plan of care                            Plan:     · Patient to be seen:  4 x/week   · Plan of Care expires:     · Plan of Care reviewed with:  patient, mother   · SLP Follow-Up:  Yes       Discharge recommendations:  rehabilitation facility     Time Tracking:     SLP Treatment Date:   05/07/19  Speech Start Time:  1122  Speech Stop Time:  1134     Speech Total Time (min):  12 min    Billable Minutes: Speech Therapy Individual 12    Josselyn Medeiros CCC-SLP  05/07/2019 5/7/2019

## 2019-05-07 NOTE — ASSESSMENT & PLAN NOTE
-S/p urgent left crani for clot evacuation  -NSGY following  -per pathology report, intraosseous hemangioma     See hospital course for functional, cognitive/speech/language, and nutrition/swallow status.      Recommendations  -  Encourage mobility, OOB in chair at least 3 hours per day, and early ambulation as appropriate   -  PT/OT evaluate and treat  -  SLP speech and cognitive evaluate and treat  -  Monitor sleep disturbances and establish consistent sleep-wake cycle  -  Monitor for bowel and bladder dysfunction  -  Monitor for shoulder pain, subluxation, & spasticity  -  Monitor for and prevent skin breakdown and pressure ulcers  · Early mobility, repositioning/weight shifting every 20-30 minutes when sitting, turn patient every 2 hours, proper mattress/overlay and chair cushioning, pressure relief/heel protector boots  -  DVT prophylaxis (if appropriate)  -  Reviewed discharge options (IP rehab, SNF, HH therapy, and OP therapy)

## 2019-05-07 NOTE — PLAN OF CARE
Problem: Occupational Therapy Goal  Goal: Occupational Therapy Goal  Goals to be met by: 5/10(2 weeks from initial evaluation)     Patient will increase functional independence with ADLs by performing:    Pt will follow 95% of simple step commands for functional tasks.   Pt will verbally ID 1/3 items for ADL task with added time. MET  *revised: 3/3 items  UE Dressing with Set-up Assistance and Supervision.  LE Dressing (pants, brief) with Set-up Assistance and Minimal Assistance. Progressing  Grooming while standing with Set-up Assistance and Contact Guard Assistance.  Toileting from toilet with Minimal Assistance for hygiene and clothing management. Met  *revised: with set up and supervision  Toilet transfer to toilet with Minimal Assistance. Met  *revised: with CGA  Functional mobility at short household distance for ADL task with Minimal Assistance. Met  *revised: with CGA       Outcome: Ongoing (interventions implemented as appropriate)  Goals remain appropriate. RENA Horta 5/7/2019

## 2019-05-07 NOTE — TREATMENT PLAN
Treatment Plan  05/07/2019  2:27 PM    Chart reviewed and case discussed with attending.     We are following Ms. Granadosnes for IS and recommendations are:  --Daily CMP, CBC, and INR     --Daily Tacrolimus level    --Continue current dose of Tacrolimus 4 mg PO in AM and 3 mg PO in PM    --We will continue to follow alongside primary team (please promptly notify us of discharge in order to arrange for appropriate outpatient follow up).    Elinor Medeiros M.D.  Gastroenterology Fellow, PGY-V  Pager: [phone]  [facility]

## 2019-05-07 NOTE — PHYSICIAN QUERY
PT Name: Holly Patel  MR #: [id number]    Physician Query Form - Pathology Findings Clarification     CDS/: Jessie ROBERTSON, RN              Contact information: [email or web address]  This form is a permanent document in the medical record.     Query Date: May 7, 2019      By submitting this query, we are merely seeking further clarification of documentation.  Please utilize your independent clinical judgment when addressing the question(s) below.      The medical record contains the following:     Findings Supporting Clinical Information Location in Medical Record      Intraosseous Hemangioma    FINAL DIAGNOSIS     SKULL, LESION, EXCISION      INTRAOSSEOUS HEMANGIOMA     Given the paucity of giant cells within the lesion, a brown tumor of hyperthyroidism is excluded.        POSTOPERATIVE DIAGNOSES:  1.  Lytic bone lesion associated with a mass in the left inferior parietal region.     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       Final Pathology Report 5/3/19                       Neurosurgery Op note 4/22/19           Neurosurgery Progress 4/28/19      Please document the clinical significance of the Pathologists findings of _____Intraosseous Hemangioma_____.    [ x  ] I agree with the Pathology Findings   [   ] I do not agree with the Pathology Findings   [   ] Other/Clarification of Findings:   [   ] Clinically Insignificant   [  ] Clinically Undetermined       Please document in your progress notes daily for the duration of treatment until resolved and include in your discharge summary.

## 2019-05-07 NOTE — SUBJECTIVE & OBJECTIVE
Interval History 5/7/2019:  Patient is seen for follow-up rehab evaluation and recommendations: Participating with therapy.     HPI, Past Medical, Family, and Social History remains the same as documented in the initial encounter.    Scheduled Medications:    bisacodyl  10 mg Rectal Daily    calcitriol  1 mcg Oral BID    calcium carbonate  2,000 mg Oral TID    carvedilol  25 mg Oral BID    cloNIDine  0.3 mg Oral Q8H    [START ON 5/8/2019] epoetin anca-ebpx (RETACRIT) injection  3,000 Units Intravenous Every Mon, Wed, Fri    famotidine  20 mg Oral Daily    hydrALAZINE  100 mg Oral Q8H    irbesartan  300 mg Oral Daily    lacosamide  50 mg Oral BID    levETIRAcetam  500 mg Oral BID    polyethylene glycol  17 g Oral BID    senna-docusate 8.6-50 mg  2 tablet Oral Daily    sodium chloride  2 g Oral BID    tacrolimus  3 mg Oral Daily    tacrolimus  4 mg Oral Daily    verapamil  240 mg Oral Daily       Diagnostic Results: Labs: Reviewed    PRN Medications: sodium chloride, sodium chloride 0.9%, hydrALAZINE, labetalol, ondansetron, oxyCODONE, sodium chloride 0.9%    Review of Systems   Constitutional: Positive for activity change.   HENT: Positive for trouble swallowing.    Respiratory: Negative for cough and shortness of breath.    Cardiovascular: Negative for chest pain and palpitations.   Musculoskeletal: Positive for gait problem.   Neurological: Positive for speech difficulty and weakness.   Psychiatric/Behavioral: Positive for confusion. Negative for agitation.     Objective:     Vital Signs (Most Recent):  Temp: 97.6 °F (36.4 °C) (05/07/19 0530)  Pulse: 78 (05/07/19 0939)  Resp: 18 (05/07/19 0530)  BP: (!) 171/112(after 9am bp med administration) (05/07/19 0939)  SpO2: 97 % (05/07/19 0530)    Vital Signs (24h Range):  Temp:  [96.7 °F (35.9 °C)-98.2 °F (36.8 °C)] 97.6 °F (36.4 °C)  Pulse:  [70-83] 78  Resp:  [18-20] 18  SpO2:  [97 %-100 %] 97 %  BP: (100-190)/() 171/112     Physical Exam    Constitutional: She appears well-developed and well-nourished. No distress.   HENT:   Head: Normocephalic and atraumatic.   Eyes: Right eye exhibits no discharge. Left eye exhibits no discharge.   Neck: Neck supple.   Cardiovascular: Intact distal pulses.   Pulmonary/Chest: Effort normal. No respiratory distress.   Abdominal: Soft. She exhibits no distension.   Neurological: She exhibits abnormal muscle tone.   - Alert in bed  - Following commands  - Decreased muscle strength noted    Skin: Skin is warm and dry.   Psychiatric: Her behavior is normal. Cognition and memory are impaired.   Nursing note and vitals reviewed.

## 2019-05-08 NOTE — PROGRESS NOTES
[facility] H[facility]ODIAL[facility]IS NOTE     Patient currently on hemodialysis for removal of uremic toxins and volume.     Patient seen and evaluated on hemodialysis, tolerating treatment, see HD flowsheet for vitals and assessments.      Ultrafiltration goal is 2L as tolerated      Labs have been reviewed and the dialysate bath has been adjusted.     Assessment/Plan:    -Corrected calcium 10.22, patient is currently on calcium carbinate and Calcitriol   -Will order ionized calcium   -Recommend adjusting calcium carbonate dosage  -Hgb 7.5, continue MARIA T therapy  -Recommend daily Phos levels  -Continue renal diet  -Recommend strict I/O's     JANESSA Mueller, AGNP-C  Nephrology  Pager:  [phone]

## 2019-05-08 NOTE — ASSESSMENT & PLAN NOTE
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

## 2019-05-08 NOTE — PLAN OF CARE
Problem: Fluid Volume Excess (Chronic Kidney Disease)  Goal: Fluid Balance  Outcome: Ongoing (interventions implemented as appropriate)  UFG 2.0L during IHD

## 2019-05-08 NOTE — PROGRESS NOTES
[facility]  Physical Medicine & Rehab  Progress Note    Patient Name: Holly Patel  MRN: [id number]  Admission Date: 4/22/2019  Length of Stay: 16 days  Attending Physician: Raymond Almanza MD    Subjective:     Principal Problem:Nontraumatic subcortical hemorrhage of left cerebral hemisphere    Hospital Course:   04/26/2019: Participated with OT.  Bed mobility CGA-Maddy.  Sit to stand and transfers Maddy.  Ambulated  4 steps Maddy with mild dizziness.  UBD/grooming Maddy and Feeding CGA.  04/29/2019: Participated with PT.  Bed mobility Maddy.  Sit to stand and transfers Maddy.  Ambulated 5 steps Maddy. Passed bedside swallow evaluation.  SLP recommending dental soft diet and thin liquids. SLP diagnosis of Aphasia and Cognitive-Linguistic Impairment.   04/30/2019: Participated with therapy.  Bed mobility CGA-Maddy.  Sit to stand CGA and transfers Maddy.  Ambulated 12 ft and 12 ft Maddy-CGA. No grooming.   5/1/19: Participated w/ OT. Static standing CGA. Endurance exercises x 5 sit to stand CGA. Declined adls.   05/02/2019: Participated with therapy.  Bed mobility Maddy.  Sit to stand Maddy.  Ambulated 20 ft x 2 trials Maddy & RW.    05/03/2019: Participated with therapy.  Bed mobility Maddy.  Sit to stand CGA.  LBD/grooming Maddy.  05/06/2019: Participated with therapy.  Bed mobility Maddy .  Sit to stand CGA and transfers Maddy.  UBD/grooming Maddy and LBD ModA.  05/07/2019: Participated with therapy.  Bed mobility SV.  Sit to stand SBA.  Ambulated 44 + 46 ft CGA & RW.  No ADLs.    Interval History 5/8/2019:  Patient is seen for follow-up rehab evaluation and recommendations: Participating with therapy.    HPI, Past Medical, Family, and Social History remains the same as documented in the initial encounter.    Scheduled Medications:    sodium chloride 0.9%   Intravenous Once    bisacodyl  10 mg Rectal Daily    calcitriol  1 mcg Oral BID    calcium carbonate  2,000 mg Oral TID    carvedilol  25 mg Oral  BID    cloNIDine  0.3 mg Oral Q8H    epoetin anca-ebpx (RETACRIT) injection  3,000 Units Intravenous Every Mon, Wed, Fri    famotidine  20 mg Oral Daily    hydrALAZINE  100 mg Oral Q8H    irbesartan  300 mg Oral Daily    isosorbide mononitrate  30 mg Oral Daily    lacosamide  50 mg Oral BID    levETIRAcetam  500 mg Oral BID    polyethylene glycol  17 g Oral BID    senna-docusate 8.6-50 mg  2 tablet Oral Daily    sodium chloride  2 g Oral BID    tacrolimus  3 mg Oral Daily    tacrolimus  4 mg Oral Daily    verapamil  240 mg Oral Daily       Diagnostic Results: Labs: Reviewed    PRN Medications: sodium chloride, sodium chloride 0.9%, hydrALAZINE, labetalol, ondansetron, oxyCODONE, sodium chloride 0.9%    Review of Systems   Constitutional: Positive for activity change.   HENT: Positive for trouble swallowing.    Respiratory: Negative for cough and shortness of breath.    Cardiovascular: Negative for chest pain and palpitations.   Musculoskeletal: Positive for gait problem.   Neurological: Positive for speech difficulty and weakness.   Psychiatric/Behavioral: Positive for confusion. Negative for agitation.     Objective:     Vital Signs (Most Recent):  Temp: 97.6 °F (36.4 °C) (05/08/19 0825)  Pulse: 80 (05/08/19 0825)  Resp: 18 (05/08/19 0825)  BP: (!) 211/139 (05/08/19 0825)  SpO2: 97 % (05/08/19 0825)    Vital Signs (24h Range):  Temp:  [97.2 °F (36.2 °C)-98.1 °F (36.7 °C)] 97.6 °F (36.4 °C)  Pulse:  [74-86] 80  Resp:  [17-18] 18  SpO2:  [96 %-99 %] 97 %  BP: (157-211)/() 211/139     Physical Exam   Constitutional: She appears well-developed and well-nourished. No distress.   HENT:   Head: Normocephalic and atraumatic.   Eyes: Right eye exhibits no discharge. Left eye exhibits no discharge.   Neck: Neck supple.   Cardiovascular: Intact distal pulses.   Pulmonary/Chest: Effort normal. No respiratory distress.   Abdominal: Soft. She exhibits no distension.   Neurological: She exhibits abnormal  muscle tone.   - Alert in bed  - Following commands  - Decreased muscle strength noted    Skin: Skin is warm and dry.   Psychiatric: Her behavior is normal. Cognition and memory are impaired.   Nursing note and vitals reviewed.      Assessment/Plan:      * Nontraumatic subcortical hemorrhage of left cerebral hemisphere  -S/p urgent left crani for clot evacuation  -NSGY following  -per pathology report, intraosseous hemangioma     See hospital course for functional, cognitive/speech/language, and nutrition/swallow status.      Recommendations  -  Encourage mobility, OOB in chair at least 3 hours per day, and early ambulation as appropriate   -  PT/OT evaluate and treat  -  SLP speech and cognitive evaluate and treat  -  Monitor sleep disturbances and establish consistent sleep-wake cycle  -  Monitor for bowel and bladder dysfunction  -  Monitor for shoulder pain, subluxation, & spasticity  -  Monitor for and prevent skin breakdown and pressure ulcers  · Early mobility, repositioning/weight shifting every 20-30 minutes when sitting, turn patient every 2 hours, proper mattress/overlay and chair cushioning, pressure relief/heel protector boots  -  DVT prophylaxis (if appropriate)  -  Reviewed discharge options (IP rehab, SNF, HH therapy, and OP therapy)      Brain tumor  -see Nontraumatic subcortical hemorrhage of left cerebral hemisphere    Hypocalcemia  -nephrology managing     Renovascular hypertension  -primary team managing  -on verapamil, irbesartan, clonidine, hydralazine, and carvedilol  -also on HD     ESRD on hemodialysis  -nephrology following    Liver replaced by transplant  -s/p liver trx 1992 due to hemangioendothelioma        Continue with inpatient rehab recommendation.        Odessa Murrell NP  Department of Physical Medicine & Rehab   [facility]

## 2019-05-08 NOTE — PROGRESS NOTES
Pt arrived to [facility] via stretcher. HD M,W,F maintenance pt. Pt alert & stable. Thrill/bruit noted, accessed LT AVF X2 15G BH needles without difficulty. Duration 3.52hrs, Qb 400ml/min, Qd 800ml/min, planned UFG 2.0L, orders reviewed.

## 2019-05-08 NOTE — PROGRESS NOTES
[facility] Hospital Medicine  Progress Note    Patient Name: Holly Patel  MRN: [id number]  Patient Class: IP- Inpatient   Admission Date: 4/22/2019  Length of Stay: 16 days  Attending Physician: Raymond Almanza MD  Primary Care Provider: Stan Sosa MD    [facility] Hospital Medicine Team: [facility] HOSP MED G Raymond Morel MD    Subjective:     Principal Problem:Nontraumatic subcortical hemorrhage of left cerebral hemisphere    HPI:  No notes on file    Hospital Course:  No notes on file    Interval History: BP still uncontrolled despite HD and multi anti hypertensive medications. Denies any symptoms. Will discuss with nephrology. Mother at bedside. Discussed plan of care. Platelets today at 50K. Will transfuse 1 unit of platelets. Awaiting rehab placement.     Review of Systems   Constitutional: Negative for chills and fever.   HENT: Negative.    Eyes: Negative for visual disturbance.   Respiratory: Negative for cough, chest tightness and shortness of breath.    Gastrointestinal: Negative for nausea and vomiting.   Musculoskeletal: Negative.    Neurological: Negative for light-headedness, numbness and headaches.     Objective:     Vital Signs (Most Recent):  Temp: 97.6 °F (36.4 °C) (05/08/19 0825)  Pulse: 80 (05/08/19 0825)  Resp: 18 (05/08/19 0825)  BP: (!) 211/139 (05/08/19 0825)  SpO2: 97 % (05/08/19 0825) Vital Signs (24h Range):  Temp:  [97.2 °F (36.2 °C)-98.1 °F (36.7 °C)] 97.6 °F (36.4 °C)  Pulse:  [74-86] 80  Resp:  [17-18] 18  SpO2:  [96 %-99 %] 97 %  BP: (157-211)/() 211/139     Weight: 54.8 kg (120 lb 13 oz)  Body mass index is 22.1 kg/m².  No intake or output data in the 24 hours ending 05/08/19 1005   Physical Exam   Constitutional: She is oriented to person, place, and time. She appears well-developed and well-nourished. No distress.   HENT:   Head: Normocephalic.   Staples removed.    Neck: Neck supple.   Cardiovascular: Normal rate and regular rhythm.   Pulmonary/Chest: Effort  normal and breath sounds normal.   Abdominal: There is no tenderness.   Distended but not tense.    Neurological: She is alert and oriented to person, place, and time.   Skin: Skin is warm. No erythema.   Vitals reviewed.      Significant Labs: All pertinent labs within the past 24 hours have been reviewed.    Significant Imaging: I have reviewed all pertinent imaging results/findings within the past 24 hours.    Assessment/Plan:      * Nontraumatic subcortical hemorrhage of left cerebral hemisphere  S/P clot evacuation and resection of L temporal lesion with cranioplasty on 4/22  Neurosurgery following  Patient neurologically stable on exam  Staples removed without complication. Patient tolerated removal well. OK to get incision wet.  Continue Keppra and Vimpat for seizure prevention  Maintain SBP < 160  OK to relax Plt goal to 60k. Please transfuse PRN  Please continue to hold any anticoagulation or DVT prophylaxis   Follow up with Dr. Powell in 2 weeks with head CT. NSGY will schedule this appt.    Per NS, an additional dose of keppra 500 mg to be given after HD on HD days  PTOT    Brain tumor  Per above       Hypocalcemia  Lab Results   Component Value Date    CALCIUM 9.1 05/08/2019    PHOS 5.9 (H) 04/28/2019     Continue calcium carbonate and calcitriol  HD as scheduled      Thrombocytopenia  Lab Results   Component Value Date    PLT 50 (L) 05/08/2019     Transfusing as per ICH      Seizures  Cont home meds of vimpat and keppra            Moderate protein-calorie malnutrition  Cont current diet  Encourage oral intake      Secondary hyperparathyroidism        Immunosuppressed  Per liver transplant       Renovascular hypertension  BP Readings from Last 3 Encounters:   05/08/19 (!) 211/139   04/09/19 (!) 173/109   04/05/19 (!) 155/94     BPs still elevated  Continue HD as scheduled  Continue verapamil, irbesartan, clonidine, hydralazine, and carvedilol  Add imdur       ESRD on hemodialysis  HD as scheduled  Continue  calcitriol     Liver replaced by transplant  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

## 2019-05-08 NOTE — ASSESSMENT & PLAN NOTE
Lab Results   Component Value Date    CALCIUM 9.1 05/08/2019    PHOS 5.9 (H) 04/28/2019     Continue calcium carbonate and calcitriol  HD as scheduled

## 2019-05-08 NOTE — TREATMENT PLAN
Treatment Plan  05/08/2019  11:10 AM    Chart reviewed and case discussed with attending.     We are following Ms. Patel for IS and recommendations are:  --Daily CMP, CBC, and INR     --Daily Tacrolimus level    --Continue current dose of Tacrolimus     --We will continue to follow alongside primary team (please promptly notify us of discharge in order to arrange for appropriate outpatient follow up).    Elinor Medeiros M.D.  Gastroenterology Fellow, PGY-V  Pager: [phone]  [facility]

## 2019-05-08 NOTE — SUBJECTIVE & OBJECTIVE
Interval History: BP still uncontrolled despite HD and multi anti hypertensive medications. Denies any symptoms. Will discuss with nephrology. Mother at bedside. Discussed plan of care. Platelets today at 50K. Will transfuse 1 unit of platelets. Awaiting rehab placement.     Review of Systems   Constitutional: Negative for chills and fever.   HENT: Negative.    Eyes: Negative for visual disturbance.   Respiratory: Negative for cough, chest tightness and shortness of breath.    Gastrointestinal: Negative for nausea and vomiting.   Musculoskeletal: Negative.    Neurological: Negative for light-headedness, numbness and headaches.     Objective:     Vital Signs (Most Recent):  Temp: 97.6 °F (36.4 °C) (05/08/19 0825)  Pulse: 80 (05/08/19 0825)  Resp: 18 (05/08/19 0825)  BP: (!) 211/139 (05/08/19 0825)  SpO2: 97 % (05/08/19 0825) Vital Signs (24h Range):  Temp:  [97.2 °F (36.2 °C)-98.1 °F (36.7 °C)] 97.6 °F (36.4 °C)  Pulse:  [74-86] 80  Resp:  [17-18] 18  SpO2:  [96 %-99 %] 97 %  BP: (157-211)/() 211/139     Weight: 54.8 kg (120 lb 13 oz)  Body mass index is 22.1 kg/m².  No intake or output data in the 24 hours ending 05/08/19 1005   Physical Exam   Constitutional: She is oriented to person, place, and time. She appears well-developed and well-nourished. No distress.   HENT:   Head: Normocephalic.   Staples removed.    Neck: Neck supple.   Cardiovascular: Normal rate and regular rhythm.   Pulmonary/Chest: Effort normal and breath sounds normal.   Abdominal: There is no tenderness.   Distended but not tense.    Neurological: She is alert and oriented to person, place, and time.   Skin: Skin is warm. No erythema.   Vitals reviewed.      Significant Labs: All pertinent labs within the past 24 hours have been reviewed.    Significant Imaging: I have reviewed all pertinent imaging results/findings within the past 24 hours.

## 2019-05-08 NOTE — ASSESSMENT & PLAN NOTE
S/P clot evacuation and resection of L temporal lesion with cranioplasty on 4/22  Neurosurgery following  Patient neurologically stable on exam  Staples removed without complication. Patient tolerated removal well. OK to get incision wet.  Continue Keppra and Vimpat for seizure prevention  Maintain SBP < 160  OK to relax Plt goal to 60k. Please transfuse PRN  Please continue to hold any anticoagulation or DVT prophylaxis   Follow up with Dr. Powell in 2 weeks with head CT. NSGY will schedule this appt.    Per NS, an additional dose of keppra 500 mg to be given after HD on HD days  PTOT

## 2019-05-08 NOTE — SUBJECTIVE & OBJECTIVE
Interval History 5/8/2019:  Patient is seen for follow-up rehab evaluation and recommendations: Participating with therapy.    HPI, Past Medical, Family, and Social History remains the same as documented in the initial encounter.    Scheduled Medications:    sodium chloride 0.9%   Intravenous Once    bisacodyl  10 mg Rectal Daily    calcitriol  1 mcg Oral BID    calcium carbonate  2,000 mg Oral TID    carvedilol  25 mg Oral BID    cloNIDine  0.3 mg Oral Q8H    epoetin anca-ebpx (RETACRIT) injection  3,000 Units Intravenous Every Mon, Wed, Fri    famotidine  20 mg Oral Daily    hydrALAZINE  100 mg Oral Q8H    irbesartan  300 mg Oral Daily    isosorbide mononitrate  30 mg Oral Daily    lacosamide  50 mg Oral BID    levETIRAcetam  500 mg Oral BID    polyethylene glycol  17 g Oral BID    senna-docusate 8.6-50 mg  2 tablet Oral Daily    sodium chloride  2 g Oral BID    tacrolimus  3 mg Oral Daily    tacrolimus  4 mg Oral Daily    verapamil  240 mg Oral Daily       Diagnostic Results: Labs: Reviewed    PRN Medications: sodium chloride, sodium chloride 0.9%, hydrALAZINE, labetalol, ondansetron, oxyCODONE, sodium chloride 0.9%    Review of Systems   Constitutional: Positive for activity change.   HENT: Positive for trouble swallowing.    Respiratory: Negative for cough and shortness of breath.    Cardiovascular: Negative for chest pain and palpitations.   Musculoskeletal: Positive for gait problem.   Neurological: Positive for speech difficulty and weakness.   Psychiatric/Behavioral: Positive for confusion. Negative for agitation.     Objective:     Vital Signs (Most Recent):  Temp: 97.6 °F (36.4 °C) (05/08/19 0825)  Pulse: 80 (05/08/19 0825)  Resp: 18 (05/08/19 0825)  BP: (!) 211/139 (05/08/19 0825)  SpO2: 97 % (05/08/19 0825)    Vital Signs (24h Range):  Temp:  [97.2 °F (36.2 °C)-98.1 °F (36.7 °C)] 97.6 °F (36.4 °C)  Pulse:  [74-86] 80  Resp:  [17-18] 18  SpO2:  [96 %-99 %] 97 %  BP: (157-211)/()  211/139     Physical Exam   Constitutional: She appears well-developed and well-nourished. No distress.   HENT:   Head: Normocephalic and atraumatic.   Eyes: Right eye exhibits no discharge. Left eye exhibits no discharge.   Neck: Neck supple.   Cardiovascular: Intact distal pulses.   Pulmonary/Chest: Effort normal. No respiratory distress.   Abdominal: Soft. She exhibits no distension.   Neurological: She exhibits abnormal muscle tone.   - Alert in bed  - Following commands  - Decreased muscle strength noted    Skin: Skin is warm and dry.   Psychiatric: Her behavior is normal. Cognition and memory are impaired.   Nursing note and vitals reviewed.

## 2019-05-08 NOTE — ASSESSMENT & PLAN NOTE
BP Readings from Last 3 Encounters:   05/08/19 (!) 211/139   04/09/19 (!) 173/109   04/05/19 (!) 155/94     BPs still elevated  Continue HD as scheduled  Continue verapamil, irbesartan, clonidine, hydralazine, and carvedilol  Add imdur

## 2019-05-08 NOTE — HPI
27 yo F w/ PMH significant for liver transplant in 1991, recent thyroidectomy on 3/27/2019, ESRD on HD (MWF), and epilepsy who presents to [facility] for higher level of care following planned surgical excision of L calvarial lesion s/p excision and cranioplasty 4/22/19. The pt presented to [facility] on 03/12/2019 with a complaint of headaches, among other symptoms, and received a workup that included a CT head which showed a left temporal calvarium lesion with mass effect. At that time she shared that she continued to have a headache, noting the pain was usually over the left temporal aspect of her head but migrated over to the right temporal aspect. She states the pain on the right is exacerbated with palpation of the area. She has no additional symptomatic complaints at this time. Currently stable following surgery. Denies acute pain, otherwise comfortable w/ non-focal neurological exam.

## 2019-05-08 NOTE — PT/OT/SLP PROGRESS
Physical Therapy      Patient Name:  Holly Patel   MRN:  [id number]    Patient not seen today secondary to Dialysis. Will follow-up at next scheduled session as able.    Adriane Gooden, PT, DPT   5/8/2019  [phone]

## 2019-05-09 NOTE — ASSESSMENT & PLAN NOTE
BP Readings from Last 3 Encounters:   05/09/19 (!) 197/112   04/09/19 (!) 173/109   04/05/19 (!) 155/94     BPs labile and difficult to control  Continue verapamil, irbesartan, clonidine, hydralazine, and carvedilol  Add imdur. Increase to 60 mg  Cont prn labetalol and hydralazine.

## 2019-05-09 NOTE — SUBJECTIVE & OBJECTIVE
Interval History: BP still uncontrolled despite HD and multi anti hypertensive medications. Will increase Imdur. Denies any symptoms. Will discuss with nephrology. Mother at bedside. Discussed plan of care. Platelets today at 49K despite 1 unit transfusion yesterday. Will transfuse another 1 unit of platelets. Awaiting rehab placement.     Review of Systems   Constitutional: Negative for chills and fever.   HENT: Negative.    Eyes: Negative for visual disturbance.   Respiratory: Negative for cough, chest tightness and shortness of breath.    Gastrointestinal: Negative for nausea and vomiting.   Musculoskeletal: Negative.    Neurological: Negative for light-headedness, numbness and headaches.     Objective:     Vital Signs (Most Recent):  Temp: 98.9 °F (37.2 °C) (05/09/19 0734)  Pulse: 81 (05/09/19 0734)  Resp: 18 (05/09/19 0734)  BP: (!) 197/112 (05/09/19 0734)  SpO2: 97 % (05/09/19 0734) Vital Signs (24h Range):  Temp:  [97 °F (36.1 °C)-98.9 °F (37.2 °C)] 98.9 °F (37.2 °C)  Pulse:  [71-84] 81  Resp:  [16-20] 18  SpO2:  [94 %-98 %] 97 %  BP: (130-214)/() 197/112     Weight: 54.8 kg (120 lb 13 oz)  Body mass index is 22.1 kg/m².    Intake/Output Summary (Last 24 hours) at 5/9/2019 0931  Last data filed at 5/8/2019 1800  Gross per 24 hour   Intake 650 ml   Output 2650 ml   Net -2000 ml      Physical Exam   Constitutional: She is oriented to person, place, and time. She appears well-developed and well-nourished. No distress.   HENT:   Head: Normocephalic.   Staples removed.    Neck: Neck supple.   Cardiovascular: Normal rate and regular rhythm.   Pulmonary/Chest: Effort normal and breath sounds normal.   Abdominal: There is no tenderness.   Distended but not tense.    Neurological: She is alert and oriented to person, place, and time.   Skin: Skin is warm. No erythema.   Vitals reviewed.      Significant Labs: All pertinent labs within the past 24 hours have been reviewed.    Significant Imaging: I have reviewed  all pertinent imaging results/findings within the past 24 hours.

## 2019-05-09 NOTE — PLAN OF CARE
Problem: SLP Goal  Goal: SLP Goal  Goals to be met by 9/16:  1. Pt will tolerate diet of thin liquids and dental soft solids without overt clinical signs of aspiration   2. Pt will participate in trials of regular solids within speech therapy sessions to help determine least restrictive diet   3. Pt will demonstrate orientation to place, situation , family members, date w/ mod cues   4. Pt will generate 4 items per category w/ mod cues to enhance organizations skills   5. Pt will label items w/ 60%acc w/ mod cues to enhance verbal expression skills   6. Pt will participate in ongoing assessment of speech language and cognitive linguistic skills to help rule out deficits and determine therapeutic plan of care   7. Pt will complete three step commands with 80% acc given min A.     Revised goals to be met 5/9  1. Pt will tolerate diet of thin liquids and dental soft solids without overt clinical signs of aspiration   2. Pt will participate in trials of regular solids within speech therapy sessions to help determine least restrictive diet   3. Pt will demonstrate orientation to place, situation , family members, date w/ mod-max cues   4. Pt will generate 2 items per category w/ mod cues to enhance organizations skills   5. Pt will label items w/ 60%acc w/ mod cues to enhance verbal expression skills   6. Pt will participate in ongoing assessment of speech language and cognitive linguistic skills to help rule out deficits and determine therapeutic plan of care           POC updated. Patient making progress toward goals.   Emily P. Abadie M.S., CCC-SLP  Speech Language Pathologist  [phone]  05/09/2019

## 2019-05-09 NOTE — PROGRESS NOTES
[facility] Hospital Medicine  Progress Note    Patient Name: Holly Patel  MRN: [id number]  Patient Class: IP- Inpatient   Admission Date: 4/22/2019  Length of Stay: 17 days  Attending Physician: Raymond Almanza MD  Primary Care Provider: Stan Sosa MD    Hospital Medicine Team: [facility] HOSP MED G Raymond Morel MD    Subjective:     Principal Problem:Nontraumatic subcortical hemorrhage of left cerebral hemisphere    HPI:  27 yo F w/ PMH significant for liver transplant in 1991, recent thyroidectomy on 3/27/2019, ESRD on HD (MWF), and epilepsy who presents to [facility] for higher level of care following planned surgical excision of L calvarial lesion s/p excision and cranioplasty 4/22/19. The pt presented to [facility] on 03/12/2019 with a complaint of headaches, among other symptoms, and received a workup that included a CT head which showed a left temporal calvarium lesion with mass effect. At that time she shared that she continued to have a headache, noting the pain was usually over the left temporal aspect of her head but migrated over to the right temporal aspect. She states the pain on the right is exacerbated with palpation of the area. She has no additional symptomatic complaints at this time. Currently stable following surgery. Denies acute pain, otherwise comfortable w/ non-focal neurological exam.      Hospital Course:  No notes on file    Interval History: BP still uncontrolled despite HD and multi anti hypertensive medications. Will increase Imdur. Denies any symptoms. Will discuss with nephrology. Mother at bedside. Discussed plan of care. Platelets today at 49K despite 1 unit transfusion yesterday. Will transfuse another 1 unit of platelets. Awaiting rehab placement.     Review of Systems   Constitutional: Negative for chills and fever.   HENT: Negative.    Eyes: Negative for visual disturbance.   Respiratory: Negative for cough, chest tightness and shortness of breath.     Gastrointestinal: Negative for nausea and vomiting.   Musculoskeletal: Negative.    Neurological: Negative for light-headedness, numbness and headaches.     Objective:     Vital Signs (Most Recent):  Temp: 98.9 °F (37.2 °C) (05/09/19 0734)  Pulse: 81 (05/09/19 0734)  Resp: 18 (05/09/19 0734)  BP: (!) 197/112 (05/09/19 0734)  SpO2: 97 % (05/09/19 0734) Vital Signs (24h Range):  Temp:  [97 °F (36.1 °C)-98.9 °F (37.2 °C)] 98.9 °F (37.2 °C)  Pulse:  [71-84] 81  Resp:  [16-20] 18  SpO2:  [94 %-98 %] 97 %  BP: (130-214)/() 197/112     Weight: 54.8 kg (120 lb 13 oz)  Body mass index is 22.1 kg/m².    Intake/Output Summary (Last 24 hours) at 5/9/2019 0931  Last data filed at 5/8/2019 1800  Gross per 24 hour   Intake 650 ml   Output 2650 ml   Net -2000 ml      Physical Exam   Constitutional: She is oriented to person, place, and time. She appears well-developed and well-nourished. No distress.   HENT:   Head: Normocephalic.   Staples removed.    Neck: Neck supple.   Cardiovascular: Normal rate and regular rhythm.   Pulmonary/Chest: Effort normal and breath sounds normal.   Abdominal: There is no tenderness.   Distended but not tense.    Neurological: She is alert and oriented to person, place, and time.   Skin: Skin is warm. No erythema.   Vitals reviewed.      Significant Labs: All pertinent labs within the past 24 hours have been reviewed.    Significant Imaging: I have reviewed all pertinent imaging results/findings within the past 24 hours.    Assessment/Plan:      * Nontraumatic subcortical hemorrhage of left cerebral hemisphere  S/P clot evacuation and resection of L temporal lesion with cranioplasty on 4/22  Neurosurgery following  Patient neurologically stable on exam  Staples removed without complication. Patient tolerated removal well. OK to get incision wet.  Continue Keppra and Vimpat for seizure prevention  Maintain SBP < 160  OK to relax Plt goal to 60k. Please transfuse PRN  Please continue to hold any  anticoagulation or DVT prophylaxis   Follow up with Dr. Powell in 2 weeks with head CT. NSGY will schedule this appt.    Per NS, an additional dose of keppra 500 mg to be given after HD on HD days  PTOT    Brain tumor  Per above       Hypocalcemia  Lab Results   Component Value Date    CALCIUM 8.3 (L) 05/09/2019    PHOS 1.5 (L) 05/09/2019     Continue calcium carbonate and calcitriol  HD as scheduled      Thrombocytopenia  Lab Results   Component Value Date    PLT 49 (L) 05/09/2019     Transfusing as per [facility]      Seizures  Cont home meds of vimpat and keppra            Moderate protein-calorie malnutrition  Cont current diet  Encourage oral intake      Secondary hyperparathyroidism        Immunosuppressed  Per liver transplant       Renovascular hypertension  BP Readings from Last 3 Encounters:   05/09/19 (!) 197/112   04/09/19 (!) 173/109   04/05/19 (!) 155/94     BPs labile and difficult to control  Continue verapamil, irbesartan, clonidine, hydralazine, and carvedilol  Add imdur. Increase to 60 mg  Cont prn labetalol and hydralazine.     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

## 2019-05-09 NOTE — ASSESSMENT & PLAN NOTE
Lab Results   Component Value Date    CALCIUM 8.3 (L) 05/09/2019    PHOS 1.5 (L) 05/09/2019     Continue calcium carbonate and calcitriol  HD as scheduled

## 2019-05-09 NOTE — TREATMENT PLAN
Treatment Plan  05/09/2019  12:51 PM    Chart reviewed and case discussed with attending.     We are following Ms. Granadosnes for IS and recommendations are:  --Daily CMP, CBC, and INR     --Daily Tacrolimus level     --Increase Tacrolimus to 4 mg in the AM and 4 mg in the PM (change made to MAR)    --We will continue to follow alongside primary team (please promptly notify us of discharge in order to arrange for appropriate outpatient follow up).    Elinor Medeiros M.D.  Gastroenterology Fellow, PGY-V  Pager: [phone]  [facility]

## 2019-05-09 NOTE — PROGRESS NOTES
"[facility]  Adult Nutrition  Progress Note    SUMMARY       Recommendations    Recommendation: 1. Liberalize diet to Cardiac soft diet.  Goals: 1. Pt's intake meals >50% x 7 days.  Nutrition Goal Status: progressing towards goal  Communication of RD Recs: shared with physician    Reason for Assessment    Reason For Assessment: RD follow-up  Diagnosis: hemorrhage  Relevant Medical History: liver tx, ESRD on HD, epilepsy  Interdisciplinary Rounds: attended  General Information Comments: Pt with fair intake soft diet (50%) says she is sick of the restricted renal diet and is requesting diet change. Completed NFPE: pt has mild temporal and calf wasting and overall deconditioning, which the pt says is baseline for her.  Nutrition Discharge Planning: adequate po intake for optimal nutrition with Renal restriction    Nutrition Risk Screen    Nutrition Risk Screen: no indicators present    Nutrition/Diet History    Spiritual, Cultural Beliefs, Voodoo Practices, Values that Affect Care: no  Factors Affecting Nutritional Intake: chewing difficulties/inability to chew food    Anthropometrics    Temp: 96.6 °F (35.9 °C)  Height Method: Measured  Height: 5' 2" (157.5 cm)  Height (inches): 62 in  Weight Method: Bed Scale  Weight: 54.8 kg (120 lb 13 oz)  Weight (lb): 120.81 lb  Ideal Body Weight (IBW), Female: 110 lb  % Ideal Body Weight, Female (lb): 109.83 lb  BMI (Calculated): 22.1  BMI Grade: 18.5-24.9 - normal       Lab/Procedures/Meds    Pertinent Labs Reviewed: reviewed  Pertinent Labs Comments: H/H 7.9/24.9, Creat 4.1, Phos 1.5, Alk phos 473, Alb 2.6  Pertinent Medications Reviewed: reviewed  Pertinent Medications Comments: vitamin D, calcium carbonate, famotidine, prograf      Estimated/Assessed Needs    Weight Used For Calorie Calculations: 54.8 kg (120 lb 13 oz)  Energy Calorie Requirements (kcal): 1918  Energy Need Method: Kcal/kg(35kcal/kg)  Protein Requirements: 66-77g(1.2-1.4g/kg)  Weight Used " For Protein Calculations: 54.8 kg (120 lb 13 oz)  Fluid Requirements (mL): 1mL/kcal or per MD     RDA Method (mL): 1918         Nutrition Prescription Ordered    Current Diet Order: Dysphagia soft, renal    Evaluation of Received Nutrient/Fluid Intake    I/O: +3.6L since admission  Comments: LBM 5/08  % Intake of Estimated Energy Needs: 25 - 50 %  % Meal Intake: 25 - 50 %    Nutrition Risk    Level of Risk/Frequency of Follow-up: low     Assessment and Plan  Nutrition Problem  Inadequate oral intake    Related to (etiology):   Dislike of renal diet    Signs and Symptoms (as evidenced by):   Pt with 50% intake most meals.    Interventions/Recommendations (treatment strategy):  Collaboration of care.  Modify diet type for increased intake.    Nutrition Diagnosis Status:   New        Monitor and Evaluation    Food and Nutrient Intake: energy intake, food and beverage intake  Food and Nutrient Adminstration: diet order  Anthropometric Measurements: weight, weight change, body mass index  Biochemical Data, Medical Tests and Procedures: electrolyte and renal panel, gastrointestinal profile, glucose/endocrine profile, inflammatory profile, lipid profile  Nutrition-Focused Physical Findings: overall appearance     Malnutrition Assessment                 Orbital Region (Subcutaneous Fat Loss): well nourished  Upper Arm Region (Subcutaneous Fat Loss): well nourished  Thoracic and Lumbar Region: well nourished   [place] Region (Muscle Loss): mild depletion  Clavicle Bone Region (Muscle Loss): mild depletion  Patellar Region (Muscle Loss): well nourished  Anterior Thigh Region (Muscle Loss): well nourished  Posterior Calf Region (Muscle Loss): mild depletion                 Nutrition Follow-Up    RD Follow-up?: Yes

## 2019-05-09 NOTE — PT/OT/SLP PROGRESS
Speech Language Pathology Treatment    Patient Name:  Holly Patel   MRN:  [id number]  Admitting Diagnosis: Nontraumatic subcortical hemorrhage of left cerebral hemisphere    Recommendations:      General Recommendations:  Speech/language therapy  Diet recommendations:  Dental Soft, Liquid Diet Level: Thin   Aspiration Precautions: 1 bite/sip at a time, Alternating bites/sips, Avoid talking while eating, Eliminate distractions, Feed only when awake/alert, HOB to 90 degrees, Monitor for s/s of aspiration, Small bites/sips and Standard aspiration precautions   General Precautions: Standard, aphasia, aspiration, fall, seizure, dental soft  Communication strategies:  go to room if call light pushed; pt with aphasia                Subjective     Pt roused given single verbal cue.     Objective:     Has the patient been evaluated by SLP for swallowing?   Yes  Keep patient NPO? No   Current Respiratory Status: room air      Patient completed orientation tasks to name and  indep, though required min A for current date and place. Patient provided 2 items within concrete categories indep, and provided 3 items within categories given semantic cues and min A.  Confrontation naming tasks of objects in room were compelted with 100% acc. Responsive naming tasks were completed wityh 50% indep, increasing to 70% given min-mod A.  Paraphasias noted to increase at this time as session progressed. Two step commands completed with 80% acc indep. Skilled education was provided to patiet re: diet recs, standard aspiration precautions of which to follow, and ongoing ST plan of care. Pt verbalized understanding.      Assessment:     Holly Patel is a 28 y.o. female with an SLP diagnosis of Aphasia and Cognitive-Linguistic Impairment.     Goals:   Multidisciplinary Problems     SLP Goals        Problem: SLP Goal    Goal Priority Disciplines Outcome   SLP Goal     SLP Ongoing (interventions implemented as appropriate)    Description:  Goals to be met by 9/16:  1. Pt will tolerate diet of thin liquids and dental soft solids without overt clinical signs of aspiration   2. Pt will participate in trials of regular solids within speech therapy sessions to help determine least restrictive diet   3. Pt will demonstrate orientation to place, situation , family members, date w/ mod cues   4. Pt will generate 4 items per category w/ mod cues to enhance organizations skills   5. Pt will label items w/ 60%acc w/ mod cues to enhance verbal expression skills   6. Pt will participate in ongoing assessment of speech language and cognitive linguistic skills to help rule out deficits and determine therapeutic plan of care   7. Pt will complete three step commands with 80% acc given min A.     Revised goals to be met 5/9  1. Pt will tolerate diet of thin liquids and dental soft solids without overt clinical signs of aspiration   2. Pt will participate in trials of regular solids within speech therapy sessions to help determine least restrictive diet   3. Pt will demonstrate orientation to place, situation , family members, date w/ mod-max cues   4. Pt will generate 2 items per category w/ mod cues to enhance organizations skills   5. Pt will label items w/ 60%acc w/ mod cues to enhance verbal expression skills   6. Pt will participate in ongoing assessment of speech language and cognitive linguistic skills to help rule out deficits and determine therapeutic plan of care                             Plan:     · Patient to be seen:  4 x/week   · Plan of Care expires:     · Plan of Care reviewed with:  patient   · SLP Follow-Up:  Yes       Discharge recommendations:  rehabilitation facility   Barriers to Discharge:  None    Time Tracking:     SLP Treatment Date:   05/09/19  Speech Start Time:  1025  Speech Stop Time:  1035     Speech Total Time (min):  10 min    Billable Minutes: Speech Therapy Individual 10    Emily P. Abadie M.S., CCC-SLP  Speech  Language Pathologist  [phone]  05/09/2019

## 2019-05-09 NOTE — PT/OT/SLP PROGRESS
Occupational Therapy   Treatment    Name: Holly Patel  MRN: [id number]  Admitting Diagnosis:  Nontraumatic subcortical hemorrhage of left cerebral hemisphere  17 Days Post-Op    Recommendations:     Discharge Recommendations: rehabilitation facility  Discharge Equipment Recommendations:  none  Barriers to discharge:  Other (Comment)(safety risk; level of skilled assistance required; remains in ICU)    Assessment:     Holly Patel is a 28 y.o. female with a medical diagnosis of Nontraumatic subcortical hemorrhage of left cerebral hemisphere.  She presents with aphasia and overall impaired endurance. Moreover, pt with more pronounced R inattention with functional tasks. He would greatly benefit from rehab at post acute level of care. Performance deficits affecting function are impaired endurance, impaired self care skills, impaired functional mobilty, gait instability, impaired balance, impaired cognition, impaired cardiopulmonary response to activity, other (comment)(aphasia).     Rehab Prognosis:  Good; patient would benefit from acute skilled OT services to address these deficits and reach maximum level of function.       Plan:     Patient to be seen 4 x/week to address the above listed problems via self-care/home management, therapeutic activities, therapeutic exercises, neuromuscular re-education, cognitive retraining  · Plan of Care Expires: 05/25/19  · Plan of Care Reviewed with: patient, mother, family    Subjective     Pain/Comfort:  · Pain Rating 1: 0/10  · Pain Rating Post-Intervention 1: 0/10    Objective:     Communicated with: RN prior to session.  Patient found up in chair with telemetry upon OT entry to room.    General Precautions: Standard, aphasia, aspiration, fall, dental soft, seizure   Orthopedic Precautions:N/A   Braces: N/A     Occupational Performance:   Functional Mobility/Transfers:  · Patient completed Sit <> Stand Transfer with contact guard assistance  with  rolling  walker   · Patient completed Bed <> Chair Transfer using Step Transfer technique with contact guard assistance with rolling walker  · Functional Mobility: ~110 ft with rolling walker and CGA; 2 standing rest breaks    Activities of Daily Living:  · Grooming: minimum assistance seated in chair; cues for initiation   · Upper Body Dressing: minimum assistance seated in chair    [facility] 6 Click ADL: 18    Treatment & Education:  -Pt alert and oriented xperson and place  -requesting mobility; completed at community distance for higher level cognitive task  -static standing item location x3 min duration ; requiring max cues for location   -requiring moderate cues for R attention with item location with mobility tasks   -Communication board updated; questions/concerns addressed within OT scope of practice  - re edu pt/family on need for elimination of external auditory input and allowing pt time for verbal and physical tasks completion with need for reinforement     Patient left up in chair with all lines intact, call button in reach, RN notified and family presentEducation:      GOALS:   Multidisciplinary Problems     Occupational Therapy Goals        Problem: Occupational Therapy Goal    Goal Priority Disciplines Outcome Interventions   Occupational Therapy Goal     OT, PT/OT Ongoing (interventions implemented as appropriate)    Description:  Goals to be met by: 5/16 (revised 5/9)    Patient will increase functional independence with ADLs by performing:    Pt will follow 95% of simple step commands for functional tasks.   Pt will verbally ID 3/3 items for ADL task with added time.   UE Dressing with Set-up Assistance and Supervision.  LE Dressing (pants, brief) with Set-up Assistance and CGA.   Grooming while standing with Set-up Assistance and Contact Guard Assistance.  Functional mobility at short household distance for ADL task CGA and no AD.                          Time Tracking:     OT Date of Treatment: 05/09/19  OT  Start Time: 1420  OT Stop Time: 1435  OT Total Time (min): 15 min    Billable Minutes:Self Care/Home Management 15    RENA Horta  5/9/2019

## 2019-05-09 NOTE — PROGRESS NOTES
IHD completed, blood returned. Pt alert & stable. Thrill/bruit noted, hemostasis 10 minutes, dry occlusive dressing applied to sites. Duration 3.5hrs, Qb 400ml/min, Qd 800ml/min, UFG 2.0L.Report called. Pt transported to [facility] via stretcher with tele.

## 2019-05-09 NOTE — PLAN OF CARE
Problem: Adult Inpatient Plan of Care  Goal: Plan of Care Review  Outcome: Ongoing (interventions implemented as appropriate)  Plan of care reviewed with pt. Pt voiced understanding. Pt alert and oriented to self. Pt is aphasic. Pt denies any c/o during the shift. Pt blood pressure systolic in the 200's. PRN dose of labetalol and hydralazine given. Pt refused to get a new iv. Fall precautions maintained. Bed in lowest position, and locked. Call light within reach and advised to call for assistance. Side rails x 2 and slip resistant socks on at this time. Will continue to monitor.

## 2019-05-09 NOTE — PLAN OF CARE
Problem: Adult Inpatient Plan of Care  Goal: Plan of Care Review  Assessment and Plan  Nutrition Problem  Inadequate oral intake    Related to (etiology):   Dislike of renal diet    Signs and Symptoms (as evidenced by):   Pt with 50% intake most meals.    Interventions/Recommendations (treatment strategy):  Collaboration of care.  Modify diet type for increased intake.    Nutrition Diagnosis Status:   New    Recommendations     Recommendation: 1. Liberalize diet to Cardiac soft diet.  Goals: 1. Pt's intake meals >50% x 7 days.  Nutrition Goal Status: progressing towards goal  Communication of RD Recs: shared with physician           Offered and patient declined

## 2019-05-09 NOTE — PLAN OF CARE
Problem: Occupational Therapy Goal  Goal: Occupational Therapy Goal  Goals to be met by: 5/16 (revised 5/9)    Patient will increase functional independence with ADLs by performing:    Pt will follow 95% of simple step commands for functional tasks.   Pt will verbally ID 3/3 items for ADL task with added time.   UE Dressing with Set-up Assistance and Supervision.  LE Dressing (pants, brief) with Set-up Assistance and CGA.   Grooming while standing with Set-up Assistance and Contact Guard Assistance.  Functional mobility at short household distance for ADL task CGA and no AD.        Outcome: Ongoing (interventions implemented as appropriate)  Goals revised per plan of care. Cont to rec Rehab at this time. RENA Horta 5/9/2019

## 2019-05-10 NOTE — ASSESSMENT & PLAN NOTE
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

## 2019-05-10 NOTE — PLAN OF CARE
Updates sent to [facility] as requested.  SW will F/U on 5/13.        Tova Valverde LMSW  Ext [phone]

## 2019-05-10 NOTE — PROGRESS NOTES
ATTENDING NOTE, HEMATOLOGY CONSULT TEAM      Patient seen and examined, chart reviewed.  Full note to follow by the consult Fellow.  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  She has a history of a liver transplant at age 1, and her platelet count is in the 60-80K range.  Prior to her procedure she was transfused platelets and had an appropriate response.    We are consulted because today's platelet count is 40,000 /mm3, and she has not been responding to platelet transfusions.  On her peripheral smear there are no schistocytes, giant platelets, or platelet clumps.    Of note, an abdominal ultrasound shows splenomegaly; her spleen is 19 cm in diameter.    I have discussed her case with Dr. Powell; he feels that at this stage, 17 days from her surgery her platelet count is adequate and does not need to be raised.    I suspect her thrombocytopenia is secondary to her enlarged spleen. Other possible contributors include sepsis, and medications (H2 blockers, heparin).  The lack of response to her recent platelet transfusions is most likelys econdary to alloimmunization.    We would recommend that H2 blockers be held and would also check a HIT panel.  If her thrombocytenia worsens, therapeutic options should be directed at reducing the spleen size (embolization, XRT or splenectomy), wwoever, so long as the Neurosurgical Service feels that her current count is adequate, expectant observation would be reasonable.  We will follow with you.        Jaison Henson MD

## 2019-05-10 NOTE — ASSESSMENT & PLAN NOTE
BP Readings from Last 3 Encounters:   05/10/19 (!) 208/120   04/09/19 (!) 173/109   04/05/19 (!) 155/94     BPs labile and difficult to control  Continue verapamil, irbesartan, clonidine, hydralazine, and carvedilol  Add imdur. Increase to 60 mg  Cont prn labetalol and hydralazine.

## 2019-05-10 NOTE — PLAN OF CARE
Problem: Adult Inpatient Plan of Care  Goal: Plan of Care Review  Outcome: Ongoing (interventions implemented as appropriate)  Plan of care reviewed with pt. Pt voiced understanding. Pt alert. Pt is aphasic. Pt denies any c/o during the shift. Blood pressure monitoring. PRN hydralazine given. Pt refused to get a new iv. Fall precautions maintained. Bed in lowest position, and locked. Call light within reach and advised to call for assistance. Side rails x 2 and slip resistant socks on at this time. Will continue to monitor.

## 2019-05-10 NOTE — PROGRESS NOTES
[facility] NOTE    Holly Patel is a 28 y.o. female currently on hemodialysis for removal of uremic toxins and volume.     Patient seen and evaluated on hemodialysis, tolerating treatment, see HD flowsheet for vitals and assessments.    No Hypotension, chest pain, shortness of breath, cramping, nausea or vomiting.       Ultrafiltration goal is 4 L as tolerated given hypertension. Patient seems to be 11.8 kg above her dry weight which is 50 kg per RN at OP dialysis unit.      Labs have been reviewed and the dialysate bath has been adjusted.    Labs:      Recent Labs   Lab 05/08/19 0414 05/09/19  0350 05/10/19  0446    137 139   K 5.0 4.3 4.7    103 105   CO2 24 26 25   BUN 34* 18 30*   CREATININE 6.3* 4.1* 5.6*   CALCIUM 9.1 8.3* 9.6   PHOS  --  1.5* 1.5*       Recent Labs   Lab 05/08/19 0414 05/09/19 0350 05/10/19  0446   WBC 3.23* 3.00* 2.93*   HGB 7.5* 7.9* 7.7*   HCT 23.2* 24.9* 23.7*   PLT 50* 49* 40*        Assessment/Plan:  HD today for removal of uremic toxins and volume.  Seen on dialysis this morning, tolerating session with current UFR, no complications.    Patient hypertensive during treatment, SBP 200s. Patient with some complaints of blurred vision, plans to extend treatment time and increase UF to 4 L. HD will help with volume removal and HTN management.   Will continue dialysis treatments while in-patient    Anemia of ESRD  Continue MARIA T with dialysis treatments  Hgb 7.7    BMM  Phos 1.5  Please liberalize diet to aid in increasing phos levels   Daily renal function panel     Darlyn Russell, MANFRED, APRN, FNP-C  Nephrology Department  Pager:  [phone]

## 2019-05-10 NOTE — PROGRESS NOTES
"Pt arrived to unit via stretcher ambulated to bed. Pt c/o "not feeling good" denied ha or dizziness. Initial VERONIKA taken noted 202/133 HR 79. CN made aware contacting NP. Pt nurse called and informed nurse to send pt BP meds to tube 31.  "

## 2019-05-10 NOTE — PLAN OF CARE
05/10/19 1648   Discharge Reassessment   Assessment Type Discharge Planning Reassessment   Provided patient/caregiver education on the expected discharge date and the discharge plan Yes   Do you have any problems affording any of your prescribed medications? No   Dis[facility]   Discharge Plan B Home with family

## 2019-05-10 NOTE — SUBJECTIVE & OBJECTIVE
Interval History: BP still uncontrolled despite HD and multi anti hypertensive medications. Will increase Imdur. Denies any symptoms. Mother at bedside. Discussed plan of care. Platelets today at 4K despite 2 units transfusion in last 2 days. Will transfuse another 1 unit of platelets. Will discuss with liver transplant and consult hematology. Awaiting rehab placement.     Review of Systems   Constitutional: Negative for chills and fever.   HENT: Negative.    Eyes: Negative for visual disturbance.   Respiratory: Negative for cough, chest tightness and shortness of breath.    Gastrointestinal: Negative for nausea and vomiting.   Musculoskeletal: Negative.    Neurological: Negative for light-headedness, numbness and headaches.     Objective:     Vital Signs (Most Recent):  Temp: 97.4 °F (36.3 °C) (05/10/19 0426)  Pulse: 83 (05/10/19 0707)  Resp: 20 (05/10/19 0520)  BP: (!) 208/120 (05/10/19 0520)  SpO2: 97 % (05/10/19 0426) Vital Signs (24h Range):  Temp:  [96.4 °F (35.8 °C)-98.6 °F (37 °C)] 97.4 °F (36.3 °C)  Pulse:  [72-85] 83  Resp:  [18-20] 20  SpO2:  [93 %-100 %] 97 %  BP: (133-208)/() 208/120     Weight: 54.8 kg (120 lb 13 oz)  Body mass index is 22.1 kg/m².  No intake or output data in the 24 hours ending 05/10/19 0821   Physical Exam   Constitutional: She is oriented to person, place, and time. She appears well-developed and well-nourished. No distress.   HENT:   Head: Normocephalic.   Staples removed.    Neck: Neck supple.   Cardiovascular: Normal rate and regular rhythm.   Pulmonary/Chest: Effort normal and breath sounds normal.   Abdominal: There is no tenderness.   Distended but not tense.    Neurological: She is alert and oriented to person, place, and time.   Skin: Skin is warm. No erythema.   Vitals reviewed.      Significant Labs: All pertinent labs within the past 24 hours have been reviewed.    Significant Imaging: I have reviewed all pertinent imaging results/findings within the past 24  hours.

## 2019-05-10 NOTE — PROGRESS NOTES
Maintenance Hd completed. Blood returned needles removed . Pressure held to site x10 mins each. LFA +bruit/thrill..  UF REMOVED 4L over 4hrs  Report called to nurse

## 2019-05-10 NOTE — ASSESSMENT & PLAN NOTE
Lab Results   Component Value Date    PLT 40 (L) 05/10/2019     Transfusing as per ICH  Goal >60K  Unresponsive to multiple transfusions  Will again transfuse today  Hematology consulted.

## 2019-05-10 NOTE — PT/OT/SLP PROGRESS
Speech Language Pathology  Patient not seen      Holly Patel  MRN: [id number]    Patient not seen today secondary to IVANA for dialysis. Will follow-up at next scheduled service date.    Emily Abadie, FRANCOIS-SLP

## 2019-05-10 NOTE — PROGRESS NOTES
Awaiting BP meds. Maintenance HD (MWF) started to LFA fistula accessed w/ 15g needles, lines secured and HD started. UF GOAL 2L over 3.5hrs    PRE HD WT 61.8kg

## 2019-05-10 NOTE — PROGRESS NOTES
PER NP D. GIUSEPPE PO BP MEDS ADMINISTERED. COREG, VERAPAMIL, IRBESARTAN. ALSO PROGRAF ADMINISTERED. /124 HR 76

## 2019-05-10 NOTE — TREATMENT PLAN
Treatment Plan  05/10/2019  11:13 AM    Chart reviewed and case discussed with attending.     We are following Ms. Patel for IS and recommendations are:  --Daily CMP, CBC, and INR     --Daily Tacrolimus level     --Continue current dose of Tacrolimus    --We will continue to follow alongside primary team (please promptly notify us of discharge in order to arrange for appropriate outpatient follow up).    Elinor Medeiros M.D.  Gastroenterology Fellow, PGY-V  Pager: [phone]  [facility]

## 2019-05-10 NOTE — PROGRESS NOTES
[facility] Hospital Medicine  Progress Note    Patient Name: Holly Patel  MRN: [id number]  Patient Class: IP- Inpatient   Admission Date: 4/22/2019  Length of Stay: 18 days  Attending Physician: Raymond Almanza MD  Primary Care Provider: Stan Sosa MD    Hospital Medicine Team: [facility] HOSP MED G Raymond Morel MD    Subjective:     Principal Problem:Nontraumatic subcortical hemorrhage of left cerebral hemisphere    HPI:  27 yo F w/ PMH significant for liver transplant in 1991, recent thyroidectomy on 3/27/2019, ESRD on HD (MWF), and epilepsy who presents to [facility] for higher level of care following planned surgical excision of L calvarial lesion s/p excision and cranioplasty 4/22/19. The pt presented to [facility] on 03/12/2019 with a complaint of headaches, among other symptoms, and received a workup that included a CT head which showed a left temporal calvarium lesion with mass effect. At that time she shared that she continued to have a headache, noting the pain was usually over the left temporal aspect of her head but migrated over to the right temporal aspect. She states the pain on the right is exacerbated with palpation of the area. She has no additional symptomatic complaints at this time. Currently stable following surgery. Denies acute pain, otherwise comfortable w/ non-focal neurological exam.      Hospital Course:  No notes on file    Interval History: BP still uncontrolled despite HD and multi anti hypertensive medications. Will increase Imdur. Denies any symptoms. Mother at bedside. Discussed plan of care. Platelets today at 4K despite 2 units transfusion in last 2 days. Will transfuse another 1 unit of platelets. Will discuss with liver transplant and consult hematology. Awaiting rehab placement.     Review of Systems   Constitutional: Negative for chills and fever.   HENT: Negative.    Eyes: Negative for visual disturbance.   Respiratory: Negative for cough, chest tightness and  shortness of breath.    Gastrointestinal: Negative for nausea and vomiting.   Musculoskeletal: Negative.    Neurological: Negative for light-headedness, numbness and headaches.     Objective:     Vital Signs (Most Recent):  Temp: 97.4 °F (36.3 °C) (05/10/19 0426)  Pulse: 83 (05/10/19 0707)  Resp: 20 (05/10/19 0520)  BP: (!) 208/120 (05/10/19 0520)  SpO2: 97 % (05/10/19 0426) Vital Signs (24h Range):  Temp:  [96.4 °F (35.8 °C)-98.6 °F (37 °C)] 97.4 °F (36.3 °C)  Pulse:  [72-85] 83  Resp:  [18-20] 20  SpO2:  [93 %-100 %] 97 %  BP: (133-208)/() 208/120     Weight: 54.8 kg (120 lb 13 oz)  Body mass index is 22.1 kg/m².  No intake or output data in the 24 hours ending 05/10/19 0821   Physical Exam   Constitutional: She is oriented to person, place, and time. She appears well-developed and well-nourished. No distress.   HENT:   Head: Normocephalic.   Staples removed.    Neck: Neck supple.   Cardiovascular: Normal rate and regular rhythm.   Pulmonary/Chest: Effort normal and breath sounds normal.   Abdominal: There is no tenderness.   Distended but not tense.    Neurological: She is alert and oriented to person, place, and time.   Skin: Skin is warm. No erythema.   Vitals reviewed.      Significant Labs: All pertinent labs within the past 24 hours have been reviewed.    Significant Imaging: I have reviewed all pertinent imaging results/findings within the past 24 hours.    Assessment/Plan:      * Nontraumatic subcortical hemorrhage of left cerebral hemisphere  S/P clot evacuation and resection of L temporal lesion with cranioplasty on 4/22  Neurosurgery following  Patient neurologically stable on exam  Staples removed without complication. Patient tolerated removal well. OK to get incision wet.  Continue Keppra and Vimpat for seizure prevention  Maintain SBP < 160  OK to relax Plt goal to 60k. Please transfuse PRN  Please continue to hold any anticoagulation or DVT prophylaxis   Follow up with Dr. Powell in 2 weeks  with head CT. NSGY will schedule this appt.    Per NS, an additional dose of keppra 500 mg to be given after HD on HD days  PTOT    Brain tumor  Per above       Hypocalcemia  Lab Results   Component Value Date    CALCIUM 9.6 05/10/2019    PHOS 1.5 (L) 05/10/2019     Continue calcium carbonate and calcitriol  HD as scheduled      Thrombocytopenia  Lab Results   Component Value Date    PLT 40 (L) 05/10/2019     Transfusing as per [facility]  Goal >60K  Unresponsive to multiple transfusions  Will again transfuse today  Hematology consulted.       Seizures  Cont home meds of vimpat and keppra            Moderate protein-calorie malnutrition  Cont current diet  Encourage oral intake    Secondary hyperparathyroidism        Immunosuppressed  Per liver transplant       Renovascular hypertension  BP Readings from Last 3 Encounters:   05/10/19 (!) 208/120   04/09/19 (!) 173/109   04/05/19 (!) 155/94     BPs labile and difficult to control  Continue verapamil, irbesartan, clonidine, hydralazine, and carvedilol  Add imdur. Increase to 60 mg  Cont prn labetalol and hydralazine.     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

## 2019-05-10 NOTE — PT/OT/SLP PROGRESS
Physical Therapy      Patient Name:  Holly Patel   MRN:  [id number]    Patient not seen today secondary to (pt off floor for dialysis x multiple attempts). Will follow-up at next scheduled session as able.    Adriane Gooden, PT, DPT   5/10/2019  [phone]

## 2019-05-10 NOTE — ASSESSMENT & PLAN NOTE
Lab Results   Component Value Date    CALCIUM 9.6 05/10/2019    PHOS 1.5 (L) 05/10/2019     Continue calcium carbonate and calcitriol  HD as scheduled

## 2019-05-11 NOTE — TREATMENT PLAN
Treatment Plan  05/11/2019  1:39 PM    Chart reviewed and case discussed with attending.     We are following Ms. Patel for IS and recommendations are:  --Daily CMP, CBC, and INR     --Daily Tacrolimus level     --Continue current dose of Tacrolimus    --We will continue to follow alongside primary team (please promptly notify us of discharge in order to arrange for appropriate outpatient follow up).    Elinor Medeiros M.D.  Gastroenterology Fellow, PGY-V  Pager: [phone]  [facility]

## 2019-05-11 NOTE — SUBJECTIVE & OBJECTIVE
Interval History: BP still uncontrolled despite HD and multi anti hypertensive medications. Will increase Imdur. Denies any symptoms. Mother at bedside. Discussed plan of care. Platelets today at 40K. Awaiting rehab placement.     Review of Systems   Constitutional: Negative for chills and fever.   HENT: Negative.    Eyes: Negative for visual disturbance.   Respiratory: Negative for cough, chest tightness and shortness of breath.    Gastrointestinal: Negative for nausea and vomiting.   Musculoskeletal: Negative.    Neurological: Negative for light-headedness, numbness and headaches.     Objective:     Vital Signs (Most Recent):  Temp: 97.5 °F (36.4 °C) (05/11/19 0458)  Pulse: 78 (05/11/19 0816)  Resp: 18 (05/11/19 0458)  BP: (!) 220/110 (05/11/19 0612)  SpO2: 100 % (05/11/19 0458) Vital Signs (24h Range):  Temp:  [97.5 °F (36.4 °C)-98.8 °F (37.1 °C)] 97.5 °F (36.4 °C)  Pulse:  [73-84] 78  Resp:  [17-18] 18  SpO2:  [96 %-100 %] 100 %  BP: (143-220)/() 220/110     Weight: 54.8 kg (120 lb 13 oz)  Body mass index is 22.1 kg/m².    Intake/Output Summary (Last 24 hours) at 5/11/2019 0852  Last data filed at 5/10/2019 1245  Gross per 24 hour   Intake 0 ml   Output 4650 ml   Net -4650 ml      Physical Exam   Constitutional: She is oriented to person, place, and time. She appears well-developed and well-nourished. No distress.   HENT:   Head: Normocephalic.   Staples removed.    Neck: Neck supple.   Cardiovascular: Normal rate and regular rhythm.   Pulmonary/Chest: Effort normal and breath sounds normal.   Abdominal: There is no tenderness.   Distended but not tense.    Neurological: She is alert and oriented to person, place, and time.   Skin: Skin is warm. No erythema.   Vitals reviewed.      Significant Labs: All pertinent labs within the past 24 hours have been reviewed.    Significant Imaging: I have reviewed all pertinent imaging results/findings within the past 24 hours.

## 2019-05-11 NOTE — CONSULTS
Consult Note    Inpatient consult to Hematology  Consult performed by: Rob Wayne MD  Consult ordered by: Raymond Almanza MD        SUBJECTIVE:     History of Present Illness:  Patient is a 28 y.o. female presents with PMHx of liver transplant (at 2yo, do endothelioma), rejection of liver noted in 2017, ESRD, secondary hyperparathryoidism s/p surgery in 03/27/2019, resection w parietal craniotomy (w finding of intrasosseous hemagioma in 04/22/2019) who presents to the hospital after having headaches and then had craniotomy, biopsy of parietal bone was completed and revealed intraossous hemagioma. She is a poor historian. Currently, her family is worried about her abdominal swelling and time of discharge.     The plts are currently 40K. She was noted to have a spleen previously of 19.7 cm on US Abdomen on 10/23/18. More chronically, the plts have been noted to be low since 01/2015, with plt counts  Ranging from 40 K to 122 K. On day of her surgery, the pt was noted to have plt count of 56 K and pt has been flucutating in between before coming down to current count of 40 K.   The pt had 13 plt units, 1 cryo, 1 prBC; the plt transfusion involved 2 units of plts on 5/8/19.    Review of patient's allergies indicates:   Allergen Reactions    Chloral hydrate Hallucinations     Other reaction(s): Hallucinations  Other reaction(s): Hives    Hydrocodone Other (See Comments)     Mental status changes    Tolerates oxycodone     Past Medical History:   Diagnosis Date    Anemia in ESRD (end-stage renal disease) 10/12/2015    dialysis tues, thursday, sat; access left arm    Chronic rejection of liver transplant 3/22/2016    Depression     Encounter for blood transfusion     ESRD on hemodialysis 9/30/2015    History of recent hospitalization 05/2018    pneumonia    History of splenomegaly 4/12/2016    Immunosuppressed 8/5/2017    Iron deficiency anemia secondary to inadequate dietary iron intake 8/16/2017    She  re[facility].   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 ()    Moderate protein-calorie malnutrition 2017    MRSA bacteremia 2017    Pneumonia     Prophylactic immunotherapy 2014    Renovascular hypertension 10/2/2015    Secondary hyperparathyroidism 2017    Seizures     Sialadenitis 3/21/2018    Thrombocytopenia 2016     Past Surgical History:   Procedure Laterality Date    BIOPSY, LIVER, TRANSJUGULAR APPROACH N/A 2018    Performed by [facility] at [facility] OR 2ND FLR    BIOPSY-LIVER N/A 2017    Performed by [facility] at [facility] OR 2ND FLR    BIOPSY-LIVER N/A 3/22/2016    Performed by [facility] Provider at [facility] OR [address] [place] [address]     SECTION      x 2    CONIZATION-CERVICAL-LEEP N/A 6/15/2018    Performed by [facility]quin [facility] at [facility] OR    [id number]; upper extremity Left 2015    Performed by Idalia Diaz MD at [facility] OR 2ND FLR    CRANIOPLASTY  2019    Performed by Jose Luis Powell MD at [facility] OR 2ND FLR    CRANIOTOMY-- left crani for clot evac with microscrope Left 2019    Performed by Jose [facility] Powell MD at [facility] OR 2ND FLR    EMBOLIZATION, BLOOD VESSEL N/A 2018    Performed by [facility]ren Ramos MD at [facility] CATH LAB    Exam Under Anesthesia N/A 2018    Performed by [facility]quin [facility] at [facility] OR 2ND FLR    Exam under anesthesia N/A 2018    Performed by [facility]quin MD at [facility] OR    Exam under anesthesia (ADD ON ) N/A 2018    Performed by NICKIE Alvarez MD at [facility] OR    Exam under anesthesia -cervical suturing  N/A 2018    Performed by Lei Sims III, MD at [facility] OR    EXCISION, NEOPLASM, SKULL with Cranioplasty Left 2019    Performed by [facility] Powell [facility] at [facility] OR 2ND FLR    FISTULOGRAM Left 2015    Performed by Idalia Diaz MD at [facility] CATH LAB    LIVER BIOPSY       LIVER TRANSPLANT  09/1992    PARATHYROIDECTOMY, Minimally Invasive Bilateral Exploration Bilateral 3/27/2019    [facility] OR 2ND FLR    SUTURE REPAIR,CERVIX  6/19/2018    [facility] OR    TUBAL LIGATION  2010     Family History   Problem Relation Age of Onset    Hypertension Mother     Hypertension Father     Cancer Sister     Heart attack Maternal Uncle     Melanoma Neg Hx     Breast cancer Neg Hx     Colon cancer Neg Hx     Ovarian cancer Neg Hx      Social History     Tobacco Use    Smoking status: Never Smoker    Smokeless tobacco: Never Used   Substance Use Topics    Alcohol use: No    Drug use: No     Review of Systems   Unable to perform ROS: Mental acuity     OBJECTIVE:     Vital Signs:  Temp:  [98.2 °F (36.8 °C)-98.8 °F (37.1 °C)]   Pulse:  [73-84]   Resp:  [17-18]   BP: (143-205)/()   SpO2:  [96 %-100 %]     Physical Exam   Constitutional: She is oriented to person, place, and time. She appears well-developed and well-nourished.   HENT:   Head: Normocephalic and atraumatic.   Eyes: Pupils are equal, round, and reactive to light. Conjunctivae and EOM are normal.   Neck: Normal range of motion. Neck supple.   Cardiovascular: Normal rate, regular rhythm and normal heart sounds. Exam reveals no gallop and no friction rub.   No murmur heard.  Pulmonary/Chest: Effort normal and breath sounds normal. No respiratory distress. She has no wheezes. She has no rales.   Abdominal: Soft. Bowel sounds are normal. She exhibits distension. There is no tenderness. There is no guarding.   Distended abdomen; enlarged spleen   Musculoskeletal: Normal range of motion.   Lymphadenopathy:     She has no cervical adenopathy.   Neurological: She is alert and oriented to person, place, and time. No cranial nerve deficit.   Skin: Skin is warm and dry.   dilaysis access in L arm     Laboratory:  CBC:   Recent Labs   Lab 05/10/19  0446   WBC  2.93*   RBC 2.82*   HGB 7.7*   HCT 23.7*   PLT 40*   MCV 84   MCH 27.3   MCHC 32.5           BMP:   Recent Labs   Lab 05/10/19  0446   *      K 4.7      CO2 25   BUN 30*   CREATININE 5.6*   CALCIUM 9.6     CMP:   Recent Labs   Lab 05/10/19  0446   *   CALCIUM 9.6   ALBUMIN 2.6*   PROT 6.5      K 4.7   CO2 25      BUN 30*   CREATININE 5.6*   ALKPHOS 486*   ALT 20   AST 25   BILITOT 0.6     LFTs:   Recent Labs   Lab 05/10/19  0446   ALT 20   AST 25   ALKPHOS 486*   BILITOT 0.6   PROT 6.5   ALBUMIN 2.6*     Coagulation:   Recent Labs   Lab 05/10/19  0446   LABPROT 12.0   INR 1.2     Cardiac markers: No results for input(s): CKMB, CPKMB, TROPONINT, TROPONINI, MYOGLOBIN in the last 168 hours.  ABGs: No results for input(s): PH, PCO2, PO2, HCO3, POCSATURATED, BE in the last 168 hours.  Microbiology Results (last 7 days)     ** No results found for the last 168 hours. **        Specimen (12h ago, onward)    None        No results for input(s): COLORU, CLARITYU, SPECGRAV, PHUR, PROTEINUA, GLUCOSEU, BILIRUBINCON, BLOODU, WBCU, RBCU, BACTERIA, MUCUS, NITRITE, LEUKOCYTESUR, UROBILINOGEN, HYALINECASTS in the last 168 hours.    Diagnostic Results:  EXAMINATION:  CT HEAD WITHOUT CONTRAST    CLINICAL HISTORY:  follow up;    TECHNIQUE:  Low dose axial images were obtained through the head.  Coronal and sagittal reformations were also performed. Contrast was not administered.    COMPARISON:  CT head without from 04/26/2019 and multiple priors.    FINDINGS:  Prior left temporal craniectomy with subsequent subjacent hematoma evacuation. Stable volume of residual intraparenchymal hemorrhage in the left temporal lobe.  Persistent surrounding edema, unchanged.  Stable mild localized mass effect resulting in effacement of surrounding sulci.  There is 4 mm rightward midline shift, previously 5 mm.    No new hemorrhage or major vascular distribution infarct elsewhere.    Minimal intraventricular  hemorrhage, unchanged. Ventricular system is unchanged in size, without hydrocephalus.    No new extra-axial blood or fluid collections.    Calvarium remains diffusely heterogeneous with stable lytic lesions. No acute fracture.    Mastoid air cells and paranasal sinuses are essentially clear.      Impression       Stable postoperative changes of recent left temporal craniectomy with subsequent subjacent hematoma evacuation as above.    Electronically signed by resident: Braden Hawley  Date: 04/30/2019  Time: 09:46    Electronically signed by: Puneet Ortiz MD  Date: 04/30/2019         ASSESSMENT/PLAN:       Plan:     Thrombocytopenia  - plts currently of 40 K  - noted to have a spleen previously of 19.7 cm on US Abdomen on 10/23/18  -  chronically, the plts have been noted to be low since 01/2015, with plt counts  Ranging from 40 K to 122 K.  - On day of her surgery, the pt was noted to have plt count of 56 K and pt has been flucutating in between before coming down to current count of 40 K.   -  pt had 13 plt units, 1 cryo, 1 prBC during this admission; the plt transfusion involved 2 units of plts on 5/8/19.  - peripheral smear does not reveal schisctocytes, giant plts, or plt clumping  - low plts are likely secondary to splenic sequestration  - other contributors would  include heparin and H2 blockers; hold H2 blockers for now  - check a HIT antibody  - if thrombocytopenia worsens, management could include splenic artery embolization vs. Radiation vs. Splenectomy  - lack of plt response to transfusions is likely secondary to alloimunization  - can transfuse for bleeding with plts less than 50 K or for lower treshold (such as 20-30K) even without bleeding      Discussed with Dr. Cassius Wayne MD  Hematology/Oncology Fellow, PGY IV  [facility]            ATTENDING NOTE, ONCOLOGY CONSULT TEAM    As above; please refer to my note of same day for our assessment and plan    Jaison Henson  MD

## 2019-05-11 NOTE — NURSING
Patient's BP was 185/100 (manually) at 04:00; administered prn Labetalol 10mg IV @ 04:54 and scheduled hydralazine 100mg and clonodine 0.3mg orally at 05:39. Rechecked BP and it was 234/132 on the monitor and 220/110 manually.    06:34 - Paged neurosurgery to report patient's BP remains elevated after treatment with both scheduled and prn BP medications; awaiting call back.   Patient currently resting in bed at this time; no sign of distress noted. Patient denies any pain or discomfort. BP monitoring ongoing.

## 2019-05-11 NOTE — ASSESSMENT & PLAN NOTE
S/P clot evacuation and resection of L temporal lesion with cranioplasty on 4/22  Neurosurgery following  Patient neurologically stable on exam  Staples removed without complication. Patient tolerated removal well. OK to get incision wet.  Continue Keppra and Vimpat for seizure prevention  Maintain SBP < 160  Please continue to hold any anticoagulation or DVT prophylaxis   Follow up with Dr. Powell in 2 weeks with head CT. NSGY will schedule this appt.    Per NS, an additional dose of keppra 500 mg to be given after HD on HD days  PTOT

## 2019-05-11 NOTE — ASSESSMENT & PLAN NOTE
BP Readings from Last 3 Encounters:   05/11/19 (!) 220/110   04/09/19 (!) 173/109   04/05/19 (!) 155/94     BPs labile and difficult to control  Continue verapamil, irbesartan, clonidine, hydralazine, and carvedilol  Add imdur. Increase to 60 mg  Cont prn labetalol and hydralazine.

## 2019-05-11 NOTE — PROGRESS NOTES
[facility] Hospital Medicine  Progress Note    Patient Name: Holly Patel  MRN: [id number]  Patient Class: IP- Inpatient   Admission Date: 4/22/2019  Length of Stay: 19 days  Attending Physician: Raymond Almanza MD  Primary Care Provider: Stan Sosa MD    Hospital Medicine Team: [facility] HOSP MED G Raymond Morel MD    Subjective:     Principal Problem:Nontraumatic subcortical hemorrhage of left cerebral hemisphere    HPI:  29 yo F w/ PMH significant for liver transplant in 1991, recent thyroidectomy on 3/27/2019, ESRD on HD (MWF), and epilepsy who presents to [facility] for higher level of care following planned surgical excision of L calvarial lesion s/p excision and cranioplasty 4/22/19. The pt presented to [facility] on 03/12/2019 with a complaint of headaches, among other symptoms, and received a workup that included a CT head which showed a left temporal calvarium lesion with mass effect. At that time she shared that she continued to have a headache, noting the pain was usually over the left temporal aspect of her head but migrated over to the right temporal aspect. She states the pain on the right is exacerbated with palpation of the area. She has no additional symptomatic complaints at this time. Currently stable following surgery. Denies acute pain, otherwise comfortable w/ non-focal neurological exam.      Hospital Course:  No notes on file    Interval History: BP still uncontrolled despite HD and multi anti hypertensive medications. Will increase Imdur. Denies any symptoms. Mother at bedside. Discussed plan of care. Platelets today at 40K. Awaiting rehab placement.     Review of Systems   Constitutional: Negative for chills and fever.   HENT: Negative.    Eyes: Negative for visual disturbance.   Respiratory: Negative for cough, chest tightness and shortness of breath.    Gastrointestinal: Negative for nausea and vomiting.   Musculoskeletal: Negative.    Neurological: Negative for  light-headedness, numbness and headaches.     Objective:     Vital Signs (Most Recent):  Temp: 97.5 °F (36.4 °C) (05/11/19 0458)  Pulse: 78 (05/11/19 0816)  Resp: 18 (05/11/19 0458)  BP: (!) 220/110 (05/11/19 0612)  SpO2: 100 % (05/11/19 0458) Vital Signs (24h Range):  Temp:  [97.5 °F (36.4 °C)-98.8 °F (37.1 °C)] 97.5 °F (36.4 °C)  Pulse:  [73-84] 78  Resp:  [17-18] 18  SpO2:  [96 %-100 %] 100 %  BP: (143-220)/() 220/110     Weight: 54.8 kg (120 lb 13 oz)  Body mass index is 22.1 kg/m².    Intake/Output Summary (Last 24 hours) at 5/11/2019 0852  Last data filed at 5/10/2019 1245  Gross per 24 hour   Intake 0 ml   Output 4650 ml   Net -4650 ml      Physical Exam   Constitutional: She is oriented to person, place, and time. She appears well-developed and well-nourished. No distress.   HENT:   Head: Normocephalic.   Staples removed.    Neck: Neck supple.   Cardiovascular: Normal rate and regular rhythm.   Pulmonary/Chest: Effort normal and breath sounds normal.   Abdominal: There is no tenderness.   Distended but not tense.    Neurological: She is alert and oriented to person, place, and time.   Skin: Skin is warm. No erythema.   Vitals reviewed.      Significant Labs: All pertinent labs within the past 24 hours have been reviewed.    Significant Imaging: I have reviewed all pertinent imaging results/findings within the past 24 hours.    Assessment/Plan:      * Nontraumatic subcortical hemorrhage of left cerebral hemisphere  S/P clot evacuation and resection of L temporal lesion with cranioplasty on 4/22  Neurosurgery following  Patient neurologically stable on exam  Staples removed without complication. Patient tolerated removal well. OK to get incision wet.  Continue Keppra and Vimpat for seizure prevention  Maintain SBP < 160  Please continue to hold any anticoagulation or DVT prophylaxis   Follow up with Dr. Powell in 2 weeks with head CT. NSGY will schedule this appt.    Per NS, an additional dose of keppra  500 mg to be given after HD on HD days  PTOT    Brain tumor  Per above       Hypocalcemia  Lab Results   Component Value Date    CALCIUM 9.3 05/11/2019    PHOS 1.4 (L) 05/11/2019     Continue calcium carbonate and calcitriol  HD as scheduled      Thrombocytopenia  Lab Results   Component Value Date    PLT 43 (L) 05/11/2019     Stable  No signs of bleeding.   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 if thrombocytopenia worsens, management could include splenic artery embolization vs. Radiation vs. Splenectomy  2. lack of plt response to transfusions is likely secondary to alloimunization  3. can transfuse for bleeding with plts less than 50 K or for lower treshold (such as 20-30K) even without bleeding    4. discussed her case with Dr. Powell; her platelet count is adequate and does not need to be raised.    Seizures  Cont home meds of vimpat and keppra    Moderate protein-calorie malnutrition  Cont current diet  Encourage oral intake    Secondary hyperparathyroidism        Immunosuppressed  Per liver transplant       Renovascular hypertension  BP Readings from Last 3 Encounters:   05/11/19 (!) 220/110   04/09/19 (!) 173/109   04/05/19 (!) 155/94     BPs labile and difficult to control  Continue verapamil, irbesartan, clonidine, hydralazine, and carvedilol  Add imdur. Increase to 60 mg  Cont prn labetalol and hydralazine.     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

## 2019-05-11 NOTE — ASSESSMENT & PLAN NOTE
Lab Results   Component Value Date    CALCIUM 9.3 05/11/2019    PHOS 1.4 (L) 05/11/2019     Continue calcium carbonate and calcitriol  HD as scheduled

## 2019-05-11 NOTE — PLAN OF CARE
Problem: Adult Inpatient Plan of Care  Goal: Plan of Care Review  Outcome: Ongoing (interventions implemented as appropriate)  POC reviewed with patient, verbalized understanding. Patient aaox4, bilateral clear breath sounds noted. No pain complaints at this time. Generalized weakness noted, ambulates occasionally. Patient anuric and goes to dialysis MWF; AV Fistula to PAL with thrill & bruit present. Abdomen distended, firm and non-tender. Incision to left head with staples; clean/dry/intact. Last platelet transfusion 5/10. No acute events overnight. Fall precautions in place, bed locked and in low position. Side rails up and locked X2. Call light and personal belongings within reach; daughter remains at bedside; will continue to monitor.

## 2019-05-11 NOTE — ASSESSMENT & PLAN NOTE
Lab Results   Component Value Date    PLT 43 (L) 05/11/2019     Stable  No signs of bleeding.   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 if thrombocytopenia worsens, management could include splenic artery embolization vs. Radiation vs. Splenectomy  2. lack of plt response to transfusions is likely secondary to alloimunization  3. can transfuse for bleeding with plts less than 50 K or for lower treshold (such as 20-30K) even without bleeding    4. discussed her case with Dr. Powell; her platelet count is adequate and does not need to be raised.

## 2019-05-12 PROBLEM — R07.9 CHEST PAIN: Status: ACTIVE | Noted: 2019-01-01

## 2019-05-12 NOTE — SUBJECTIVE & OBJECTIVE
Interval History: BP remains uncontrolled. Given an additional dose of imdur overnight. Denies any symptoms of HA, nausea, or visual changes. Reporting heart burn symptoms and chest pain today. Abdomen remains distended and uncomfortable. Having regular BMs.     Review of Systems   Constitutional: Negative for chills and fever.   HENT: Negative.    Eyes: Negative for visual disturbance.   Respiratory: Negative for cough, chest tightness and shortness of breath.    Cardiovascular: Positive for chest pain.   Gastrointestinal: Positive for abdominal distention. Negative for nausea and vomiting.   Musculoskeletal: Negative.    Neurological: Negative for light-headedness, numbness and headaches.     Objective:     Vital Signs (Most Recent):  Temp: 97.5 °F (36.4 °C) (05/12/19 0706)  Pulse: 85 (05/12/19 1116)  Resp: 18 (05/12/19 0706)  BP: (!) 180/110 (05/12/19 1035)  SpO2: 96 % (05/12/19 0706) Vital Signs (24h Range):  Temp:  [97.5 °F (36.4 °C)-98.4 °F (36.9 °C)] 97.5 °F (36.4 °C)  Pulse:  [75-86] 85  Resp:  [18-19] 18  SpO2:  [96 %-99 %] 96 %  BP: (168-231)/(102-141) 180/110     Weight: 54.8 kg (120 lb 13 oz)  Body mass index is 22.1 kg/m².    Intake/Output Summary (Last 24 hours) at 5/12/2019 1526  Last data filed at 5/11/2019 1800  Gross per 24 hour   Intake 250 ml   Output --   Net 250 ml      Physical Exam   Constitutional: She is oriented to person, place, and time. She appears well-developed and well-nourished. No distress.   HENT:   Head: Normocephalic.   Surgical site healing well   Neck: Neck supple.   Cardiovascular: Normal rate and regular rhythm.   Pulmonary/Chest: Effort normal and breath sounds normal.   Abdominal: There is no tenderness.   Distended   Mild TTP throughout    Musculoskeletal:   No TTP over epigastric and r chest wall    Neurological: She is alert and oriented to person, place, and time.   Skin: Skin is warm. No erythema.   Vitals reviewed.      Significant Labs: All pertinent labs within the  past 24 hours have been reviewed.    Significant Imaging: I have reviewed all pertinent imaging results/findings within the past 24 hours.

## 2019-05-12 NOTE — NURSING
Patient complaining of chest pain that described as burning at the epigastric area. Dr. Almanza was notified. Plan= Gi cocktail, cardiac enzymes,  tums and EKG ordered. Will continue to monitor.

## 2019-05-12 NOTE — ASSESSMENT & PLAN NOTE
Reporting CP / heart burn like symptoms  Initial troponin negative; continue to trend   EKG pending  GI cocktail and an additional dose of tums for symptomatic relief  Will continue to monitor   Otherwise clinically stable

## 2019-05-12 NOTE — NURSING
[facility]. Talked to  about patient's BP and the PRN meds that I administered. Waiting on Dr to make a decision about what he would like to prescribe patient.

## 2019-05-12 NOTE — PROGRESS NOTES
[facility] Hospital Medicine  Progress Note    Patient Name: Holly Patel  MRN: [id number]  Patient Class: IP- Inpatient   Admission Date: 4/22/2019  Length of Stay: 20 days  Attending Physician: Raymond Almanza MD  Primary Care Provider: Stan Sosa MD    Hospital Medicine Team: [facility]  Zeenat Almanza MD    Subjective:     Principal Problem:Nontraumatic subcortical hemorrhage of left cerebral hemisphere    HPI:  29 yo F w/ PMH significant for liver transplant in 1991, recent thyroidectomy on 3/27/2019, ESRD on HD (MWF), and epilepsy who presents to [facility] for higher level of care following planned surgical excision of L calvarial lesion s/p excision and cranioplasty 4/22/19. The pt presented to [facility] on 03/12/2019 with a complaint of headaches, among other symptoms, and received a workup that included a CT head which showed a left temporal calvarium lesion with mass effect. At that time she shared that she continued to have a headache, noting the pain was usually over the left temporal aspect of her head but migrated over to the right temporal aspect. She states the pain on the right is exacerbated with palpation of the area. She has no additional symptomatic complaints at this time. Currently stable following surgery. Denies acute pain, otherwise comfortable w/ non-focal neurological exam.      Hospital Course:  No notes on file    Interval History: BP remains uncontrolled. Given an additional dose of imdur overnight. Denies any symptoms of HA, nausea, or visual changes. Reporting heart burn symptoms and chest pain today. Abdomen remains distended and uncomfortable. Having regular BMs.     Review of Systems   Constitutional: Negative for chills and fever.   HENT: Negative.    Eyes: Negative for visual disturbance.   Respiratory: Negative for cough, chest tightness and shortness of breath.    Cardiovascular: Positive for chest pain.   Gastrointestinal: Positive for abdominal  distention. Negative for nausea and vomiting.   Musculoskeletal: Negative.    Neurological: Negative for light-headedness, numbness and headaches.     Objective:     Vital Signs (Most Recent):  Temp: 97.5 °F (36.4 °C) (05/12/19 0706)  Pulse: 85 (05/12/19 1116)  Resp: 18 (05/12/19 0706)  BP: (!) 180/110 (05/12/19 1035)  SpO2: 96 % (05/12/19 0706) Vital Signs (24h Range):  Temp:  [97.5 °F (36.4 °C)-98.4 °F (36.9 °C)] 97.5 °F (36.4 °C)  Pulse:  [75-86] 85  Resp:  [18-19] 18  SpO2:  [96 %-99 %] 96 %  BP: (168-231)/(102-141) 180/110     Weight: 54.8 kg (120 lb 13 oz)  Body mass index is 22.1 kg/m².    Intake/Output Summary (Last 24 hours) at 5/12/2019 1526  Last data filed at 5/11/2019 1800  Gross per 24 hour   Intake 250 ml   Output --   Net 250 ml      Physical Exam   Constitutional: She is oriented to person, place, and time. She appears well-developed and well-nourished. No distress.   HENT:   Head: Normocephalic.   Surgical site healing well   Neck: Neck supple.   Cardiovascular: Normal rate and regular rhythm.   Pulmonary/Chest: Effort normal and breath sounds normal.   Abdominal: There is no tenderness.   Distended   Mild TTP throughout    Musculoskeletal:   No TTP over epigastric and r chest wall    Neurological: She is alert and oriented to person, place, and time.   Skin: Skin is warm. No erythema.   Vitals reviewed.      Significant Labs: All pertinent labs within the past 24 hours have been reviewed.    Significant Imaging: I have reviewed all pertinent imaging results/findings within the past 24 hours.    Assessment/Plan:      * Nontraumatic subcortical hemorrhage of left cerebral hemisphere  S/P clot evacuation and resection of L temporal lesion with cranioplasty on 4/22  Neurosurgery following  Patient neurologically stable on exam  Staples removed without complication. Patient tolerated removal well. OK to get incision wet.  Continue Keppra and Vimpat for seizure prevention  Maintain SBP < 160  Please  continue to hold any anticoagulation or DVT prophylaxis   Follow up with Dr. Powell in 2 weeks with head CT. NSGY will schedule this appt.    Per NS, an additional dose of keppra 500 mg to be given after HD on HD days  PTOT    Chest pain  Reporting CP / heart burn like symptoms  Initial troponin negative; continue to trend   EKG pending  GI cocktail and an additional dose of tums for symptomatic relief  Will continue to monitor   Otherwise clinically stable       Brain tumor  Per above       Hypocalcemia  Lab Results   Component Value Date    CALCIUM 10.3 05/12/2019    PHOS 1.4 (L) 05/12/2019     Continue calcium carbonate and calcitriol  HD as scheduled      Thrombocytopenia  Lab Results   Component Value Date    PLT 35 (LL) 05/12/2019     Platelets dropped   No signs of bleeding.   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 if thrombocytopenia worsens, management could include splenic artery embolization vs. Radiation vs. Splenectomy  2. lack of plt response to transfusions is likely secondary to alloimunization  3. can transfuse for bleeding with plts less than 50 K or for lower treshold (such as 20-30K) even without bleeding    4. discussed her case with Dr. Powell; her platelet count is adequate and does not need to be raised.    Will follow up on tomorrow's CBC and discuss with heme / IR for embolization should it continue to trend down     Seizures  Cont home meds of vimpat and keppra    Moderate protein-calorie malnutrition  Cont current diet  Encourage oral intake    Secondary hyperparathyroidism        Immunosuppressed  Per liver transplant       Renovascular hypertension  BP Readings from Last 3 Encounters:   05/12/19 (!) 180/110   04/09/19 (!) 173/109   04/05/19 (!) 155/94     BPs elevated and maxed on all meds but imdur  Continue verapamil, irbesartan, clonidine, hydralazine, and carvedilol  imdur increased to 60 mg on 5/10; will titrate up on 5/13  Cont prn  labetalol and hydralazine.     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

## 2019-05-12 NOTE — PLAN OF CARE
Problem: Adult Inpatient Plan of Care  Goal: Plan of Care Review  Outcome: Ongoing (interventions implemented as appropriate)  paatient remains free from injury or fall. Monitor blood pressure . No neuro changes noted or reported. Plan= possible dialysis in AM. Will continue to monitor.

## 2019-05-12 NOTE — TREATMENT PLAN
Treatment Plan  05/12/2019  2:12 PM    Chart reviewed and case discussed with attending.     We are following Ms. Patel for IS and recommendations are:  --Daily CMP, CBC, and INR     --Daily Tacrolimus level     --Continue current dose of Tacrolimus    --We will continue to follow alongside primary team (please promptly notify us of discharge in order to arrange for appropriate outpatient follow up).    Elinor Medeiros M.D.  Gastroenterology Fellow, PGY-V  Pager: [phone]  [facility]

## 2019-05-12 NOTE — ASSESSMENT & PLAN NOTE
Lab Results   Component Value Date    CALCIUM 10.3 05/12/2019    PHOS 1.4 (L) 05/12/2019     Continue calcium carbonate and calcitriol  HD as scheduled

## 2019-05-12 NOTE — ASSESSMENT & PLAN NOTE
BP Readings from Last 3 Encounters:   05/12/19 (!) 180/110   04/09/19 (!) 173/109   04/05/19 (!) 155/94     BPs elevated and maxed on all meds but imdur  Continue verapamil, irbesartan, clonidine, hydralazine, and carvedilol  imdur increased to 60 mg on 5/10; will titrate up on 5/13  Cont prn labetalol and hydralazine.

## 2019-05-12 NOTE — PLAN OF CARE
Problem: Adult Inpatient Plan of Care  Goal: Plan of Care Review  Outcome: Ongoing (interventions implemented as appropriate)  Patient is AAO x4. POC reviewed with patient. Patient verbalized understanding. Patient's breathing is unlabored with equal chest expansion. Patient's BP remained outside of parameters through shift. Physician was contacted. Patient's stomach is distended and round. It is soft/tender upon palpation. Bowel sounds normoactive in all quadrants. Patient remains anuric. Left arm AV fistula is positive for a bruit and thrill. Patient has generalized weakness. Patient is receptive to treatment, and states that her BP is labile at home as well. Call bell with in reach, side rails up x2, bed in lowest position, no signs of distress noted.

## 2019-05-12 NOTE — PLAN OF CARE
Problem: Adult Inpatient Plan of Care  Goal: Plan of Care Review  Outcome: Ongoing (interventions implemented as appropriate)  Patient remains free from injury or fall.  Non neuro changes noted or reported. Epigastric pain resolved. Patient is up in a chair. Will continue to monitor.

## 2019-05-12 NOTE — ASSESSMENT & PLAN NOTE
Lab Results   Component Value Date    PLT 35 (LL) 05/12/2019     Platelets dropped   No signs of bleeding.   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 if thrombocytopenia worsens, management could include splenic artery embolization vs. Radiation vs. Splenectomy  2. lack of plt response to transfusions is likely secondary to alloimunization  3. can transfuse for bleeding with plts less than 50 K or for lower treshold (such as 20-30K) even without bleeding    4. discussed her case with Dr. Powell; her platelet count is adequate and does not need to be raised.    Will follow up on tomorrow's CBC and discuss with heme / IR for embolization should it continue to trend down

## 2019-05-13 PROBLEM — R07.9 CHEST PAIN: Status: RESOLVED | Noted: 2019-01-01 | Resolved: 2019-01-01

## 2019-05-13 NOTE — ASSESSMENT & PLAN NOTE
BP Readings from Last 3 Encounters:   05/13/19 (!) 173/103   04/09/19 (!) 173/109   04/05/19 (!) 155/94     BPs elevated and maxed on all meds but imdur  Continue verapamil, irbesartan, clonidine, hydralazine, and carvedilol  imdur increased to 90 mg   Cont prn labetalol and hydralazine.

## 2019-05-13 NOTE — PROGRESS NOTES
Progress Notes      SUBJECTIVE:   Pt mentions pain and swelling in abdomen diffusely. She is not in any significant discomfort at bedside. No new episodes of bleeding or bruising; she was not given any additional transfusions over weekend. We discussed about starting steroids today and possibly other management of splenic sequestration as well.     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 ()    Moderate protein-calorie malnutrition 2017    MRSA bacteremia 2017    Pneumonia     Prophylactic immunotherapy 2014    Renovascular hypertension 10/2/2015    Secondary hyperparathyroidism 2017    Seizures     Sialadenitis 3/21/2018    Thrombocytopenia 2016     Past Surgical History:   Procedure Laterality Date    BIOPSY, LIVER, TRANSJUGULAR APPROACH N/A 2018    Performed by [facility] at [facility] OR 2ND FLR    BIOPSY-LIVER N/A 2017    Performed by [facility] at [facility] OR 2ND FLR    BIOPSY-LIVER N/A 3/22/2016    Performed by [facility] at [facility] OR 2ND FLR     SECTION      x 2    CONIZATION-CERVICAL-LEEP N/A  6/15/2018    Performed by Neelam Marroquin MD at [facility] OR    [id number]-[id number]-FB; upper extremity Left 7/17/2015    Performed by Idalia Diaz MD at [facility] OR 2ND FLR    CRANIOPLASTY  4/22/2019    Performed by Jose Luis Powell MD at [facility] OR 2ND FLR    CRANIOTOMY-- left crani for clot evac with microscrope Left 4/22/2019    Performed by Jose Luis Powell MD at [facility] OR 2ND FLR    EMBOLIZATION, BLOOD VESSEL N/A 7/21/2018    Performed by Aren Ramos MD at [facility] CATH LAB    Exam Under Anesthesia N/A 8/8/2018    Performed by [facility]elam Marroquin MD at [facility] OR 2ND FLR    Exam under anesthesia N/A 6/19/2018    Performed by Neelam Marroquin MD at [facility] OR    Exam under anesthesia (ADD ON ) N/A 7/9/2018    Performed by NICKIE Alvarez MD at [facility] OR    Exam under anesthesia -cervical suturing  N/A 7/26/2018    Performed by Lei Sims III, MD at [facility] OR    EXCISION, NEOPLASM, SKULL with Cranioplasty Left 4/22/2019    Performed by Jose Luis Powell MD at [facility] OR 2ND FLR    FISTULOGRAM Left 12/4/2015    Performed by Idalia Diaz MD at [facility] CATH LAB    LIVER BIOPSY      LIVER TRANSPLANT  09/1992    PARATHYROIDECTOMY, Minimally Invasive Bilateral Exploration Bilateral 3/27/2019    Performed by Ashley Guallpa MD at [facility] OR 2ND FLR    SUTURE REPAIR,CERVIX  6/19/2018    Performed by Neelam Marroquin MD at [facility] OR    TUBAL LIGATION  2010     Family History   Problem Relation Age of Onset    Hypertension Mother     Hypertension Father     Cancer Sister     Heart attack Maternal Uncle     Melanoma Neg Hx     Breast cancer Neg Hx     Colon cancer Neg Hx     Ovarian cancer Neg Hx      Social History     Tobacco Use    Smoking status: Never Smoker    Smokeless tobacco: Never Used   Substance Use Topics    Alcohol use: No    Drug use: No     Review of Systems   Constitutional: Negative for chills and fever.   Respiratory: Negative for shortness of breath.    Cardiovascular:  Negative for chest pain.   Gastrointestinal: Positive for abdominal pain. Negative for nausea and vomiting.   Skin: Negative for rash.   Neurological: Negative for seizures and loss of consciousness.     OBJECTIVE:     Vital Signs:  Temp:  [97 °F (36.1 °C)-97.8 °F (36.6 °C)]   Pulse:  [72-84]   Resp:  [18]   BP: (181-213)/(112-138)   SpO2:  [97 %-100 %]     Physical Exam   Constitutional: She is oriented to person, place, and time. She appears well-developed and well-nourished.   HENT:   Head: Normocephalic and atraumatic.   Eyes: Pupils are equal, round, and reactive to light. Conjunctivae and EOM are normal.   Neck: Normal range of motion. Neck supple.   Cardiovascular: Normal rate, regular rhythm and normal heart sounds. Exam reveals no gallop and no friction rub.   No murmur heard.  Pulmonary/Chest: Effort normal and breath sounds normal. No respiratory distress. She has no wheezes. She has no rales.   Abdominal: Soft. Bowel sounds are normal. She exhibits distension. There is no tenderness. There is no guarding.   Distended abdomen; enlarged spleen   Musculoskeletal: Normal range of motion.   Lymphadenopathy:     She has no cervical adenopathy.   Neurological: She is alert and oriented to person, place, and time. No cranial nerve deficit.   Skin: Skin is warm and dry.   dilaysis access in L arm     Laboratory:  CBC:   Recent Labs   Lab 05/13/19 0447   WBC 2.81*   RBC 2.75*   HGB 7.4*   HCT 23.4*   PLT 34*   MCV 85   MCH 26.9*   MCHC 31.6*     BMP:   Recent Labs   Lab 05/13/19 0447   *      K 4.7      CO2 24   BUN 33*   CREATININE 6.3*   CALCIUM 9.7     CMP:   Recent Labs   Lab 05/13/19 0447   *   CALCIUM 9.7   ALBUMIN 2.7*   PROT 6.7      K 4.7   CO2 24      BUN 33*   CREATININE 6.3*   ALKPHOS 521*   ALT 22   AST 29   BILITOT 0.6     LFTs:   Recent Labs   Lab 05/13/19 0447   ALT 22   AST 29   ALKPHOS 521*   BILITOT 0.6   PROT 6.7   ALBUMIN 2.7*     Coagulation:   Recent  Labs   Lab 05/13/19 0447   LABPROT 12.0   INR 1.2     Cardiac markers:   Recent Labs   Lab 05/13/19 0447   TROPONINI 0.021     ABGs: No results for input(s): PH, PCO2, PO2, HCO3, POCSATURATED, BE in the last 168 hours.  Microbiology Results (last 7 days)     ** No results found for the last 168 hours. **        Specimen (12h ago, onward)    None        No results for input(s): COLORU, CLARITYU, SPECGRAV, PHUR, PROTEINUA, GLUCOSEU, BILIRUBINCON, BLOODU, WBCU, RBCU, BACTERIA, MUCUS, NITRITE, LEUKOCYTESUR, UROBILINOGEN, HYALINECASTS in the last 168 hours.    Diagnostic Results:      ASSESSMENT/PLAN:         Plan:       Acute on chronicThrombocytopenia  - plts down-trended to 34 K today  - noted to have a spleen previously of 19.7 cm on US Abdomen on 10/23/18  -  chronically, the plts have been noted to be low since 01/2015, with plt counts  Ranging from 40 K to 122 K.  - On day of her surgery, the pt was noted to have plt count of 56 K and pt has been flucutating in between before coming down to current count of 40 K.   -  pt had 13 plt units, 1 cryo, 1 prBC during this admission; the plt transfusion involved 2 units of plts on 5/8/19.  - peripheral smear does not reveal schisctocytes, giant plts, or plt clumping  - low plts chronically likely secondary to splenic sequestration  - H2 blockers stopped  - HIT antibody in process  - lack of plt response to transfusions is likely secondary to alloimunization  -      Discussed with Dr. Cassius Wayne MD  Hematology/Oncology Fellow, PGY IV  [facility]        ATTENDING NOTE, HEMATOLOGY CONSULT TEAM    As above; events of last 72 hours noted.  Patient seen and examined, chart reviewed.  Peripheral smear from earlier today was reviewed.  She has no clumps, schistocytes, or giant platelets.  Appears comfortable, in NAD.  Lungs are clear to auscultation.  Abdomen is soft, nontender.  .    RECOMMEND  Follow CBC daily.  Check HIT panel  (pending).  Repeat fibrinogen tomorrow.  If platelets continue to drop, would first discuss with the Neurosurgical Service to determine at what level they feel that an intervention is needed to raise her platelet count.  If they feel that her platelet needs to be raised, would consider steroids initially (dexamethasone 40 mg QD x 4 days) and also interventions to reduce her splenic sequestration sich as splenic embolization or splenic XRT.  Splenectomy is probably not a very good option at this point.    We will follow with you.            Jaison Henson MD

## 2019-05-13 NOTE — PLAN OF CARE
Problem: Adjustment to Illness (Stroke, Hemorrhagic)  Goal: Optimal Coping    Intervention: Support Patient/Family Psychosocial Response to Stroke  Multiple family members present at bedside. Mother present and very involved in care. Discussed HTN and HTN medications with mother.

## 2019-05-13 NOTE — PT/OT/SLP PROGRESS
Occupational Therapy  HOLD      Patient Name:  Holly Patel   MRN:  [id number]    Patient with /136 at 0825 this AM. RN at bedside. OT to HOLD at this time and follow up at later time as appropriate.     RENA Horta  5/13/2019

## 2019-05-13 NOTE — PROGRESS NOTES
Patient received from floor with report from EDMOND Rosado. Maintenance(MWF) dialysis began per orders via 15 gauge buttonhole needles to left forearm fistula.

## 2019-05-13 NOTE — PROGRESS NOTES
Paged Team IMG to notify of /136. Too soon to administer IV hydralazine or labetalol. Will soon administer HTN medications.

## 2019-05-13 NOTE — PLAN OF CARE
Problem: Adult Inpatient Plan of Care  Goal: Plan of Care Review  Outcome: Ongoing (interventions implemented as appropriate)  Patient is AAO x4. POC reviewed with patient. Patient verbalized understanding. Patient's breathing is unlabored with equal chest expansion. Patient's LUE AV fistula is positive for a bruit and thrill. Left arm limb restriction maintained. Patient's abdomen is round and distended. Patient is anuric. Patient's BP were labile during the shift. Patient slept well through the night. Call bell with in reach, side rails up x2, bed in lowest position. No signs of distress or complaints.WCTM.

## 2019-05-13 NOTE — PT/OT/SLP PROGRESS
Physical Therapy      Patient Name:  Holly Patel   MRN:  [id number]    Patient not seen today secondary to (Hold in AM 2* elevated BP. Pt off floor for dialysis in PM.). Will follow-up at next scheduled session as able.    Adriane Gooden, PT, DPT   5/13/2019  [phone]

## 2019-05-13 NOTE — PROGRESS NOTES
Labetalol 10 mgs IVP given per dialysis unit's request. Discussed elevated BPs with Dr. Almanza earlier this AM, and discussed with dialysis RN. This is pt's usual pattern of BP. No distress noted.

## 2019-05-13 NOTE — PT/OT/SLP PROGRESS
"Speech Language Pathology Treatment    Patient Name:  Holly Patel   MRN:  [id number]  Admitting Diagnosis: Nontraumatic subcortical hemorrhage of left cerebral hemisphere    Recommendations:                 General Recommendations:  Speech/language therapy  Diet recommendations:  Regular, Liquid Diet Level: Thin   Aspiration Precautions: 1 bite/sip at a time, Alternating bites/sips, Avoid talking while eating, Eliminate distractions, Feed only when awake/alert, HOB to 90 degrees, Monitor for s/s of aspiration, Small bites/sips and Standard aspiration precautions   General Precautions: Standard, fall, seizure, dental soft  Communication strategies:  go to room if call light pushed; pt with aphasia    Subjective     "I'm waiting to be taken" pt referring to dialysis   Pt mother and sibling at the bedside     Pain/Comfort:  Pain Rating 1: 0/10  Pain Rating Post-Intervention 1: 0/10no pain pre/post     Objective:     Has the patient been evaluated by SLP for swallowing?   Yes  Keep patient NPO? No   Current Respiratory Status: room air      Pt remains oriented to self and . Pt continues to warrant mod-max cueing to orient to date and continues to perseverate on numerical value for month vs. word for month. Pt completed labeling tasks of pictures items w/ 20% acc independently . Pt persistently perseverating on "phone and remote" which were not items pictured. With SLP semantic item description and phonemic cueing pt increased to 80% acc and 100% acc when provided with binary choice. Scaffolding cues remain warranted with word finding deficits. Pt labeled single letters with 70% acc. Errors were similar in shape such as "p for b." Pt with limited awareness to errors but able to correct when SLP brought to her attention. Pt quite tearful this session re: overall aphasia reporting "why is it likes this? I'm not slow, I'm smart." SLP offered pt education and support re: current deficits (word finding, " perseverations, agraphia etc.) and need for ongoing therapy. Pt demonstrated understanding. SLP discussed with family activities to do outside of speech therapy sessions and how to consistently cue pt for success with communication attempts.  Speech will continue to follow.     Pt has been tolerating soft solids and thin liquid for consecutive weeks without difficulty. Pt appearing safe to advance to fully unrestricted diet of regular solids and thin liquids. SLP will contact team to advance diet.     Assessment:     Holly Patel is a 28 y.o. female with an SLP diagnosis of Aphasia and Cognitive-Linguistic Impairment.      Goals:   Multidisciplinary Problems     SLP Goals        Problem: SLP Goal    Goal Priority Disciplines Outcome   SLP Goal     SLP Ongoing (interventions implemented as appropriate)   Description:  Goals to be met by 5/16:  1. Pt will tolerate diet of thin liquids and dental soft solids without overt clinical signs of aspiration   2. Pt will participate in trials of regular solids within speech therapy sessions to help determine least restrictive diet   3. Pt will demonstrate orientation to place, situation , family members, date w/ mod cues   4. Pt will generate 4 items per category w/ mod cues to enhance organizations skills   5. Pt will label items w/ 60%acc w/ mod cues to enhance verbal expression skills   6. Pt will participate in ongoing assessment of speech language and cognitive linguistic skills to help rule out deficits and determine therapeutic plan of care   7. Pt will complete three step commands with 80% acc given min A.                         Plan:     · Patient to be seen:  4 x/week   · Plan of Care expires:     · Plan of Care reviewed with:  patient   · SLP Follow-Up:  Yes       Discharge recommendations:  rehabilitation facility     Time Tracking:     SLP Treatment Date:   05/13/19  Speech Start Time:  1127  Speech Stop Time:  1144     Speech Total Time (min):  17  min    Billable Minutes: Speech Therapy Individual 8 and Seld Care/Home Management Training 9    Josselyn Medeiros CCC-SLP  05/13/2019 5/13/2019

## 2019-05-13 NOTE — PLAN OF CARE
Problem: SLP Goal  Goal: SLP Goal  Goals to be met by 5/16:  1. Pt will tolerate diet of thin liquids and dental soft solids without overt clinical signs of aspiration   2. Pt will participate in trials of regular solids within speech therapy sessions to help determine least restrictive diet   3. Pt will demonstrate orientation to place, situation , family members, date w/ mod cues   4. Pt will generate 4 items per category w/ mod cues to enhance organizations skills   5. Pt will label items w/ 60%acc w/ mod cues to enhance verbal expression skills   6. Pt will participate in ongoing assessment of speech language and cognitive linguistic skills to help rule out deficits and determine therapeutic plan of care   7. Pt will complete three step commands with 80% acc given min A.         Pt with slow progress towards goals     Josselyn Medeiros MS, CCC-SLP  Speech Language Pathologist  Pager: [phone]  Date 5/13/2019

## 2019-05-13 NOTE — PROGRESS NOTES
[facility] Hospital Medicine  Progress Note    Patient Name: Holly Patel  MRN: [id number]  Patient Class: IP- Inpatient   Admission Date: 4/22/2019  Length of Stay: 21 days  Attending Physician: Zeenat Almanza MD  Primary Care Provider: Stan Sosa MD    Hospital Medicine Team: [facility] HOSP MED G Zeenat Almanza MD    Subjective:     Principal Problem:Nontraumatic subcortical hemorrhage of left cerebral hemisphere    HPI:  29 yo F w/ PMH significant for liver transplant in 1991, recent thyroidectomy on 3/27/2019, ESRD on HD (MWF), and epilepsy who presents to [facility] for higher level of care following planned surgical excision of L calvarial lesion s/p excision and cranioplasty 4/22/19. The pt presented to [facility] on 03/12/2019 with a complaint of headaches, among other symptoms, and received a workup that included a CT head which showed a left temporal calvarium lesion with mass effect. At that time she shared that she continued to have a headache, noting the pain was usually over the left temporal aspect of her head but migrated over to the right temporal aspect. She states the pain on the right is exacerbated with palpation of the area. She has no additional symptomatic complaints at this time. Currently stable following surgery. Denies acute pain, otherwise comfortable w/ non-focal neurological exam.      Hospital Course:  No notes on file    Interval History: BP remains uncontrolled. Platelets remaining in the mid 30s. No evidence of bleeding. Chest pain and abdominal discomfort resolved yesterday with GI cocktail and additional tums.    Review of Systems   Constitutional: Negative for chills and fever.   HENT: Negative.    Eyes: Negative for visual disturbance.   Respiratory: Negative for cough, chest tightness and shortness of breath.    Cardiovascular: Negative for chest pain.   Gastrointestinal: Positive for abdominal distention. Negative for nausea and vomiting.   Musculoskeletal:  Negative.    Neurological: Negative for light-headedness, numbness and headaches.     Objective:     Vital Signs (Most Recent):  Temp: 98 °F (36.7 °C) (05/13/19 1415)  Pulse: 76 (05/13/19 1615)  Resp: 18 (05/13/19 1500)  BP: (!) 167/106 (05/13/19 1615)  SpO2: 98 % (05/13/19 1415) Vital Signs (24h Range):  Temp:  [97 °F (36.1 °C)-98.2 °F (36.8 °C)] 98 °F (36.7 °C)  Pulse:  [70-84] 76  Resp:  [16-18] 18  SpO2:  [94 %-100 %] 98 %  BP: (166-213)/() 167/106     Weight: 54.8 kg (120 lb 13 oz)  Body mass index is 22.1 kg/m².  No intake or output data in the 24 hours ending 05/13/19 1654   Physical Exam   Constitutional: She is oriented to person, place, and time. She appears well-developed and well-nourished. No distress.   HENT:   Head: Normocephalic.   Surgical site healing well   Neck: Neck supple.   Cardiovascular: Normal rate and regular rhythm.   Pulmonary/Chest: Effort normal and breath sounds normal.   Abdominal: There is no tenderness.   Distended   Mild TTP throughout    Neurological: She is alert and oriented to person, place, and time.   Skin: Skin is warm. No erythema.   Vitals reviewed.      Significant Labs: All pertinent labs within the past 24 hours have been reviewed.    Significant Imaging: I have reviewed all pertinent imaging results/findings within the past 24 hours.    Assessment/Plan:      * Nontraumatic subcortical hemorrhage of left cerebral hemisphere  S/P clot evacuation and resection of L temporal lesion with cranioplasty on 4/22  Neurosurgery following  Patient neurologically stable on exam  Staples removed without complication. Patient tolerated removal well. OK to get incision wet.  Continue Keppra and Vimpat for seizure prevention  Maintain SBP < 160  Please continue to hold any anticoagulation or DVT prophylaxis   Follow up with Dr. Powell in 2 weeks with head CT. NSGY will schedule this appt.    Per NS, an additional dose of keppra 500 mg to be given after HD on HD days  PTOT    Brain  tumor  Per above       Hypocalcemia  Lab Results   Component Value Date    CALCIUM 10.3 05/12/2019    PHOS 1.4 (L) 05/12/2019     Continue calcium carbonate and calcitriol  HD as scheduled      Thrombocytopenia  Lab Results   Component Value Date    PLT 34 (LL) 05/13/2019     Platelets now in the mid 30s  No signs of bleeding.   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 if thrombocytopenia worsens, management could include splenic artery embolization vs. Radiation vs. Splenectomy  2. lack of plt response to transfusions is likely secondary to alloimunization  3. can transfuse for bleeding with plts less than 50 K or for lower treshold (such as 20-30K) even without bleeding    4. discussed her case with Dr. Powell; her platelet count is adequate and does not need to be raised.    Heme onc update given drop in platelets as of 5/13:  Follow CBC daily.  Check HIT panel (pending).  Repeat fibrinogen tomorrow.  If platelets continue to drop, would first discuss with the Neurosurgical Service to determine at what level they feel that an intervention is needed to raise her platelet count.  If they feel that her platelet needs to be raised, would consider steroids initially (dexamethasone 40 mg QD x 4 days) and also interventions to reduce her splenic sequestration sich as splenic embolization or splenic XRT. Splenectomy is probably not a very good option at this point.             Seizures  Cont home meds of vimpat and keppra    Moderate protein-calorie malnutrition  Cont current diet  Encourage oral intake    Secondary hyperparathyroidism        Immunosuppressed  Per liver transplant       Renovascular hypertension  BP Readings from Last 3 Encounters:   05/13/19 (!) 173/103   04/09/19 (!) 173/109   04/05/19 (!) 155/94     BPs elevated and maxed on all meds but imdur  Continue verapamil, irbesartan, clonidine, hydralazine, and carvedilol  imdur increased to 90 mg   Cont prn  labetalol and hydralazine.     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

## 2019-05-13 NOTE — SUBJECTIVE & OBJECTIVE
Interval History: BP remains uncontrolled. Platelets remaining in the mid 30s. No evidence of bleeding. Chest pain and abdominal discomfort resolved yesterday with GI cocktail and additional tums.    Review of Systems   Constitutional: Negative for chills and fever.   HENT: Negative.    Eyes: Negative for visual disturbance.   Respiratory: Negative for cough, chest tightness and shortness of breath.    Cardiovascular: Negative for chest pain.   Gastrointestinal: Positive for abdominal distention. Negative for nausea and vomiting.   Musculoskeletal: Negative.    Neurological: Negative for light-headedness, numbness and headaches.     Objective:     Vital Signs (Most Recent):  Temp: 98 °F (36.7 °C) (05/13/19 1415)  Pulse: 76 (05/13/19 1615)  Resp: 18 (05/13/19 1500)  BP: (!) 167/106 (05/13/19 1615)  SpO2: 98 % (05/13/19 1415) Vital Signs (24h Range):  Temp:  [97 °F (36.1 °C)-98.2 °F (36.8 °C)] 98 °F (36.7 °C)  Pulse:  [70-84] 76  Resp:  [16-18] 18  SpO2:  [94 %-100 %] 98 %  BP: (166-213)/() 167/106     Weight: 54.8 kg (120 lb 13 oz)  Body mass index is 22.1 kg/m².  No intake or output data in the 24 hours ending 05/13/19 1654   Physical Exam   Constitutional: She is oriented to person, place, and time. She appears well-developed and well-nourished. No distress.   HENT:   Head: Normocephalic.   Surgical site healing well   Neck: Neck supple.   Cardiovascular: Normal rate and regular rhythm.   Pulmonary/Chest: Effort normal and breath sounds normal.   Abdominal: There is no tenderness.   Distended   Mild TTP throughout    Neurological: She is alert and oriented to person, place, and time.   Skin: Skin is warm. No erythema.   Vitals reviewed.      Significant Labs: All pertinent labs within the past 24 hours have been reviewed.    Significant Imaging: I have reviewed all pertinent imaging results/findings within the past 24 hours.

## 2019-05-13 NOTE — PLAN OF CARE
Problem: Adult Inpatient Plan of Care  Goal: Plan of Care Review  Outcome: Outcome(s) achieved Date Met: 05/13/19  Dialysis completed. Needles removed from left forearm fistula with pressure held to sites for 7 minutes with hemostasis achieved. Gauze and tape applied. Patient dialyzed for 3.5 hours with fluid removal of 3 liters.Tolerated well. Denies complaints. Report called to Rebecca Rosado.

## 2019-05-13 NOTE — PROGRESS NOTES
[facility] HEMODIALYSIS NOTE     Patient currently on hemodialysis for removal of uremic toxins and volume.     Patient seen and evaluated on hemodialysis, tolerating treatment, see HD flowsheet for vitals and assessments.      Ultrafiltration goal is 3L as tolerated, keep map >65     Labs have been reviewed and the dialysate bath has been adjusted.     Assessment/Plan:    -Patient seen on HD, w/o complaints  -Hypertensive on HD with SBP in 170/s, will hold EPO for this treatment  -Hgb 7.4, will order ferritin and iron studies  -Phos 1.4, continue renal diet  -Strict I/O's and daily weights  -Will continue to follow while inpatient      JANESSA Mueller, AGNP-C  Nephrology  Pager:  [phone]

## 2019-05-13 NOTE — ASSESSMENT & PLAN NOTE
Lab Results   Component Value Date    PLT 34 (LL) 05/13/2019     Platelets now in the mid 30s  No signs of bleeding.   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 if thrombocytopenia worsens, management could include splenic artery embolization vs. Radiation vs. Splenectomy  2. lack of plt response to transfusions is likely secondary to alloimunization  3. can transfuse for bleeding with plts less than 50 K or for lower treshold (such as 20-30K) even without bleeding    4. discussed her case with Dr. Powell; her platelet count is adequate and does not need to be raised.    Heme onc update given drop in platelets as of 5/13:  Follow CBC daily.  Check HIT panel (pending).  Repeat fibrinogen tomorrow.  If platelets continue to drop, would first discuss with the Neurosurgical Service to determine at what level they feel that an intervention is needed to raise her platelet count.  If they feel that her platelet needs to be raised, would consider steroids initially (dexamethasone 40 mg QD x 4 days) and also interventions to reduce her splenic sequestration sich as splenic embolization or splenic XRT. Splenectomy is probably not a very good option at this point.

## 2019-05-13 NOTE — PROGRESS NOTES
/119. Mother states that commonly the pt's blood pressure will elevate after being given hypertensive medications, that this has been a longstanding issue with her blood pressure.

## 2019-05-14 NOTE — PLAN OF CARE
Problem: Adult Inpatient Plan of Care  Goal: Plan of Care Review  Plan of care reviewed with pt. Pt voiced understanding. Pt AAOx4. Pt any c/o heartburn,  during the shift, gi cocktail given. No apparent distress noted. Fall precautions maintained. Bed in lowest position, and locked. Call light within reach and advised to call for assistance. Side rails x 2 and slip resistant socks on at this time. MD gave pt okay to go outside via wheelchair with mother. Pt progressing towards goals, will continue to monitor.

## 2019-05-14 NOTE — CONSULTS
Radiology Consult    Holly Patel is a 28 y.o. female with a history of liver transplant in , recent thyroidectomy on 3/27/2019, ESRD on HD (MWF), and epilepsy. Patient had surgical excision of L calvarial lesion s/p excision and cranioplasty 19 and has subsequently had postoperative thrombocytopenia.     We were consulted for consideration of splenic artery embolization procedure as attempt to improve thrombocytopenia as neurosurgery would like to have platelets above 40,000.       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 ()    Moderate protein-calorie malnutrition 2017    MRSA bacteremia 2017    Pneumonia     Prophylactic immunotherapy 2014    Renovascular hypertension 10/2/2015    Secondary hyperparathyroidism 2017    Seizures     Sialadenitis 3/21/2018    Thrombocytopenia 2016     Past Surgical History:   Procedure Laterality Date    BIOPSY, LIVER, TRANSJUGULAR APPROACH N/A 2018    Performed [facility] OR 2ND FLR    BIOPSY-LIVER N/A 2017    Performed by [facility] OR 2ND FLR    BIOPSY-LIVER N/A 3/22/2016    Performed by [facility] OR 2ND FLR     SECTION      x 2    CONIZATION-CERVICAL-LEEP N/A 6/15/2018    Performed by [facility] at [facility] OR    [id number]; upper  extremity Left 7/17/2015    Performed by [facility] at [facility] OR 2ND FLR    CRANIOPLASTY  4/22/2019    Performed by Jose Lius Powell MD at [facility] OR 2ND FLR    CRANIOTOMY-- left crani for clot evac with microscrope Left 4/22/2019    Performed by Jose Luis [facility] at [facility] OR 2ND FLR    EMBOLIZATION, BLOOD VESSEL N/A 7/21/2018    Performed by [facility] at [facility] CATH LAB    Exam Under Anesthesia N/A 8/8/2018    Performed by [facility]ma [facility]quin [facility] at [facility] OR 2ND FLR    Exam under anesthesia N/A 6/19/2018    Performed by [facility] at [facility] OR    Exam under anesthesia (ADD ON ) N/A 7/9/2018    Performed by NICKIE Alvarez MD at [facility] OR    Exam under anesthesia -cervical suturing  N/A 7/26/2018    Performed by Lei Sims III, MD at [facility] OR    EXCISION, NEOPLASM, SKULL with Cranioplasty Left 4/22/2019    Performed by Jose Luis [facility] at [facility] OR 2ND FLR    FISTULOGRAM Left 12/4/2015    Performed by Idalia Diaz MD at [facility] CATH LAB    LIVER BIOPSY      LIVER TRANSPLANT  09/1992    PARATHYROIDECTOMY, Minimally Invasive Bilateral Exploration Bilateral 3/27/2019    Performed by Ashley Guallpa MD at [facility] OR 2ND FLR    SUTURE REPAIR,CERVIX  6/19/2018    Performed by [facility]quin [facility] at [facility] OR    TUBAL LIGATION  2010       Discussed with primary team, Dr. Almanza.     Imaging reviewed with Radiology staff, Dr. Radford.     Scheduled Meds:    bisacodyl  10 mg Rectal Daily    calcitriol  1 mcg Oral BID    calcium carbonate  2,000 mg Oral TID    carvedilol  25 mg Oral BID    cloNIDine  0.3 mg Oral Q8H    dexamethasone  40 mg Oral Daily    epoetin anca-ebpx (RETACRIT) injection  3,000 Units Intravenous Every Mon, Wed, Fri    hydrALAZINE  100 mg Oral Q8H    irbesartan  300 mg Oral Daily    isosorbide mononitrate  90 mg Oral Daily    lacosamide  50 mg Oral BID    levETIRAcetam  500 mg Oral BID    pantoprazole  40 mg Oral Daily    polyethylene glycol  17  g Oral BID    senna-docusate 8.6-50 mg  2 tablet Oral Daily    sodium chloride  2 g Oral BID    tacrolimus  4 mg Oral Daily    tacrolimus  4 mg Oral Daily    verapamil  240 mg Oral Daily     Continuous Infusions:   PRN Meds:sodium chloride, bacitracin, hydrALAZINE, labetalol, ondansetron, oxyCODONE, sodium chloride 0.9%    Allergies:   Review of patient's allergies indicates:   Allergen Reactions    Chloral hydrate Hallucinations     Other reaction(s): Hallucinations  Other reaction(s): Hives    Hydrocodone Other (See Comments)     Mental status changes    Tolerates oxycodone       Labs:  Recent Labs   Lab 05/14/19 0424   INR 1.2       Recent Labs   Lab 05/14/19 0424   WBC 2.64*   HGB 7.5*   HCT 23.8*   MCV 85   PLT 34*      Recent Labs   Lab 05/14/19 0424         K 4.2      CO2 24   BUN 17   CREATININE 4.3*   CALCIUM 9.1   ALT 18   AST 26   ALBUMIN 2.6*   BILITOT 0.7         Vitals (Most Recent):  Temp: 96.8 °F (36 °C) (05/14/19 1641)  Pulse: 80 (05/14/19 1701)  Resp: 18 (05/14/19 1641)  BP: (!) 179/113 (05/14/19 1701)  SpO2: 100 % (05/14/19 1641)    Assessment/Plan:   At this time the primary team is attempting to improve patient's plts with dexamethasone. We were consulted for consideration of splenic artery embolization to stop splenic sequestration. If steroids do not improve plts by Friday primary team would like us to consider embo procedure. I spoke with family and patient about the procedure and situation. I explained to them that SEN may improve plts but that she may have postembolization procedure that would give her fever, aches, flu-like symptoms, pain, and leukocytosis. Patient and family were worried about this and expressed serious concern. They said they would like to further speak with each other and remaining family before making a final decision.     We will touch base with primary team over the course of this week and observe how patient's platelets respond to  steroids. If they do not improve I will speak with family and patient again for a final decision on SEN.       Dylan Spence MD  PGY - 3  Department of Radiology

## 2019-05-14 NOTE — PLAN OF CARE
Problem: Adult Inpatient Plan of Care  Goal: Plan of Care Review  Review plan of care /medications given, safety measures, options for bp managment

## 2019-05-14 NOTE — PT/OT/SLP PROGRESS
Physical Therapy Treatment    Patient Name:  Holly Patel   MRN:  [id number]    Recommendations:     Discharge Recommendations:  rehabilitation facility   Discharge Equipment Recommendations: walker, rolling   Barriers to discharge: Decreased caregiver support    Assessment:     Holly Patel is a 28 y.o. female admitted with a medical diagnosis of Nontraumatic subcortical hemorrhage of left cerebral hemisphere.  She presents with the following impairments/functional limitations:  weakness, impaired functional mobilty, impaired endurance, gait instability, impaired balance, impaired self care skills, impaired cognition, decreased coordination, decreased safety awareness.  Pt able to complete gait training without seated rest break needed and with less fatigue noted following compared to previous sessions. However, pt continues to demo impaired endurance with pt declining further activity following gait training. Pt continues to require BUE support throughout gait 2* standing balance impairments. Pt also continues to demo inattention, requiring increased cues to safety complete given tasks. Pt would continue to benefit from skilled acute PT in order to address current deficits and progress functional mobility.     Rehab Prognosis: Good; patient would benefit from acute skilled PT services to address these deficits and reach maximum level of function.     Recent Surgery: Procedure(s) (LRB):  CRANIOTOMY-- left crani for clot evac with microscrope (Left) 22 Days Post-Op    Plan:     During this hospitalization, patient to be seen 3 x/week to address the identified rehab impairments via gait training, therapeutic activities, therapeutic exercises, neuromuscular re-education and progress toward the following goals:    · Plan of Care Expires:  05/28/19    Subjective     Chief Complaint: heartburn  Patient/Family Comments/goals: Pt initially declining PT 2* stomach pain but agreeable following encouragement.    Pain/Comfort:  · Pain Rating 1: 0/10      Objective:     Communicated with RN prior to session.  Patient found supine with telemetry, bed alarm upon PT entry to room.     General Precautions: Standard, fall, seizure, aphasia    Orthopedic Precautions:N/A   Braces: N/A     Functional Mobility:  · Bed Mobility:     · Supine to Sit: modified independence  · Transfers:     · Sit to Stand:  supervision with rolling walker  · Gait: 64 ft. with RW and CGA  · Chair follow throughout but no seated rest break needed   · demo'd decreased mckay, decreased step length, impaired weight-shifting ability, and mild gait instability   · Cues for safety awareness, RW management, attention to surroundings, and self-pacing   · Brief gait trial (x~5 ft) without AD but with increased instability noted and pt reaching for walls, etc for UE support.       AM-PAC 6 CLICK MOBILITY  Turning over in bed (including adjusting bedclothes, sheets and blankets)?: 4  Sitting down on and standing up from a chair with arms (e.g., wheelchair, bedside commode, etc.): 4  Moving from lying on back to sitting on the side of the bed?: 4  Moving to and from a bed to a chair (including a wheelchair)?: 3  Need to walk in hospital room?: 3  Climbing 3-5 steps with a railing?: 2  Basic Mobility Total Score: 20       Therapeutic Activities and Exercises:  Donned socks while seated EOB with set-up assist. Donned 2nd gown as robe.   Required increased cues and frequent re-direction throughout session in order to attend to tasks at hand with inattention and distraction noted. Increased time allowed for pt communication 2* aphasia. Difficulty noted with object identification with perseveration noted on previously named objects.   Gait training performed as described above. Encouragement provided for further therapeutic activities but pt declining 2* fatigue and heartburn.     Patient left up in chair with all lines intact, call button in reach, RN notified and  family present.    GOALS:   Multidisciplinary Problems     Physical Therapy Goals        Problem: Physical Therapy Goal    Goal Priority Disciplines Outcome Goal Variances Interventions   Physical Therapy Goal     PT, PT/OT Ongoing (interventions implemented as appropriate)     Description:  Goals to be met by: 19    Patient will increase functional independence with mobility by performin. Supine to sit with Set-up Coleman - met   2. Sit to supine with Set-up Coleman  3. Sit to stand transfer with Supervision - met   4. Gait  x 200 feet with Stand By Assistance using no assistive device  5. Lower extremity exercise program x30 reps per handout, with independence to improve muscular strength and endurance.                           Time Tracking:     PT Received On: 19  PT Start Time: 1040     PT Stop Time: 1050  PT Total Time (min): 10 min     Billable Minutes: Therapeutic Activity 10    Treatment Type: Treatment  PT/PTA: PT     PTA Visit Number: 0     Adriane Gooden PT, DPT   2019

## 2019-05-14 NOTE — PROGRESS NOTES
[facility] Hospital Medicine  Progress Note    Patient Name: Holly Patel  MRN: [id number]  Patient Class: IP- Inpatient   Admission Date: 4/22/2019  Length of Stay: 22 days  Attending Physician: Zeenat Almanaz MD  Primary Care Provider: Stan Sosa MD    Hospital Medicine Team: [facility] G Zeenat Almanza MD    Subjective:     Principal Problem:Nontraumatic subcortical hemorrhage of left cerebral hemisphere    HPI:  29 yo F w/ PMH significant for liver transplant in 1991, recent thyroidectomy on 3/27/2019, ESRD on HD (MWF), and epilepsy who presents to [facility] for higher level of care following planned surgical excision of L calvarial lesion s/p excision and cranioplasty 4/22/19. The pt presented to [facility] on 03/12/2019 with a complaint of headaches, among other symptoms, and received a workup that included a CT head which showed a left temporal calvarium lesion with mass effect. At that time she shared that she continued to have a headache, noting the pain was usually over the left temporal aspect of her head but migrated over to the right temporal aspect. She states the pain on the right is exacerbated with palpation of the area. She has no additional symptomatic complaints at this time. Currently stable following surgery. Denies acute pain, otherwise comfortable w/ non-focal neurological exam.      Hospital Course:  No notes on file    Interval History: BP remains uncontrolled. Platelets holding in the mid 30s.     Review of Systems   Constitutional: Negative for chills and fever.   HENT: Negative.    Eyes: Negative for visual disturbance.   Respiratory: Negative for cough, chest tightness and shortness of breath.    Cardiovascular: Negative for chest pain.   Gastrointestinal: Positive for abdominal distention. Negative for nausea and vomiting.   Musculoskeletal: Negative.    Neurological: Negative for light-headedness, numbness and headaches.     Objective:     Vital Signs (Most  Recent):  Temp: 98.8 °F (37.1 °C) (05/14/19 0801)  Pulse: 74 (05/14/19 1524)  Resp: 18 (05/14/19 0801)  BP: (!) 220/100 (05/14/19 0801)  SpO2: (!) 92 % (05/14/19 0801) Vital Signs (24h Range):  Temp:  [98 °F (36.7 °C)-98.8 °F (37.1 °C)] 98.8 °F (37.1 °C)  Pulse:  [73-86] 74  Resp:  [18] 18  SpO2:  [92 %-100 %] 92 %  BP: (164-220)/(100-130) 220/100     Weight: 54.8 kg (120 lb 13 oz)  Body mass index is 22.1 kg/m².    Intake/Output Summary (Last 24 hours) at 5/14/2019 1538  Last data filed at 5/14/2019 0630  Gross per 24 hour   Intake 32902.33 ml   Output 3656 ml   Net 75103.33 ml      Physical Exam   Constitutional: She is oriented to person, place, and time. She appears well-developed and well-nourished. No distress.   Sitting up comfortably in chair    HENT:   Head: Normocephalic.   Surgical site healing well   Neck: Neck supple.   Cardiovascular: Normal rate and regular rhythm.   Pulmonary/Chest: Effort normal and breath sounds normal.   Abdominal: There is no tenderness.   Distended    Neurological: She is alert and oriented to person, place, and time.   Skin: Skin is warm. No erythema.   Vitals reviewed.      Significant Labs: All pertinent labs within the past 24 hours have been reviewed.    Significant Imaging: I have reviewed all pertinent imaging results/findings within the past 24 hours.    Assessment/Plan:      * Nontraumatic subcortical hemorrhage of left cerebral hemisphere  S/P clot evacuation and resection of L temporal lesion with cranioplasty on 4/22  Neurosurgery following  Patient neurologically stable on exam  Staples removed without complication. Patient tolerated removal well. OK to get incision wet.  Continue Keppra and Vimpat for seizure prevention  Maintain SBP < 160  Platelet goal of > 40 K per Dr. Powell  Please continue to hold any anticoagulation or DVT prophylaxis   Follow up with Dr. Powell in 2 weeks with head CT. NSGY will schedule this appt.    Per NS, an additional dose of keppra 500 mg  to be given after HD on HD days  PTOT    Brain tumor  Per above       Hypocalcemia  Lab Results   Component Value Date    CALCIUM 10.3 05/12/2019    PHOS 1.4 (L) 05/12/2019     Continue calcium carbonate and calcitriol  HD as scheduled      Thrombocytopenia  Lab Results   Component Value Date    PLT 34 (LL) 05/14/2019     Platelets now in the mid 30s  No signs of bleeding.   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 if thrombocytopenia worsens, management could include splenic artery embolization vs. Radiation vs. Splenectomy  2. lack of plt response to transfusions is likely secondary to alloimunization  3. can transfuse for bleeding with plts less than 50 K or for lower treshold (such as 20-30K) even without bleeding    4. discussed her case with Dr. Powell; her platelet count is adequate and does not need to be raised.    Heme onc update given drop in platelets as of 5/13:  Follow CBC daily.  Check HIT panel (pending).  Repeat fibrinogen tomorrow.  If platelets continue to drop, would first discuss with the Neurosurgical Service to determine at what level they feel that an intervention is needed to raise her platelet count.  If they feel that her platelet needs to be raised, would consider steroids initially (dexamethasone 40 mg QD x 4 days) and also interventions to reduce her splenic sequestration sich as splenic embolization or splenic XRT. Splenectomy is probably not a very good option at this point.    Case discussed with NS, heme onc and IR. Platelet goal of >40K. Will continue with dexamethasone x 4 days with platelet transfusion to be given today, 5/14. Should this fail, then will plan for IR embolization most likely 5/20 if patient in agreement.           Seizures  Cont home meds of vimpat and keppra    Moderate protein-calorie malnutrition  Cont current diet  Encourage oral intake    Secondary hyperparathyroidism        Immunosuppressed  Per liver  transplant       Renovascular hypertension  BP Readings from Last 3 Encounters:   05/14/19 (!) 220/100   04/09/19 (!) 173/109   04/05/19 (!) 155/94     BPs elevated and maxed on all meds but imdur  Continue verapamil, irbesartan, clonidine, hydralazine, and carvedilol  imdur increased to 90 mg   Cont prn labetalol and hydralazine.     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

## 2019-05-14 NOTE — PLAN OF CARE
Problem: SLP Goal  Goal: SLP Goal  Goals to be met by 5/16:  1. Pt will tolerate diet of thin liquids and dental soft solids without overt clinical signs of aspiration   2. Pt will participate in trials of regular solids within speech therapy sessions to help determine least restrictive diet   3. Pt will demonstrate orientation to place, situation , family members, date w/ mod cues   4. Pt will generate 4 items per category w/ mod cues to enhance organizations skills   5. Pt will label items w/ 60%acc w/ mod cues to enhance verbal expression skills   6. Pt will participate in ongoing assessment of speech language and cognitive linguistic skills to help rule out deficits and determine therapeutic plan of care   7. Pt will complete three step commands with 80% acc given min A.          Pt with slow yet steady progress towards goals     Josselyn Medeiros MS, CCC-SLP  Speech Language Pathologist  Pager: [phone]  Date 5/14/2019

## 2019-05-14 NOTE — SUBJECTIVE & OBJECTIVE
Interval History: BP remains uncontrolled. Platelets holding in the mid 30s.     Review of Systems   Constitutional: Negative for chills and fever.   HENT: Negative.    Eyes: Negative for visual disturbance.   Respiratory: Negative for cough, chest tightness and shortness of breath.    Cardiovascular: Negative for chest pain.   Gastrointestinal: Positive for abdominal distention. Negative for nausea and vomiting.   Musculoskeletal: Negative.    Neurological: Negative for light-headedness, numbness and headaches.     Objective:     Vital Signs (Most Recent):  Temp: 98.8 °F (37.1 °C) (05/14/19 0801)  Pulse: 74 (05/14/19 1524)  Resp: 18 (05/14/19 0801)  BP: (!) 220/100 (05/14/19 0801)  SpO2: (!) 92 % (05/14/19 0801) Vital Signs (24h Range):  Temp:  [98 °F (36.7 °C)-98.8 °F (37.1 °C)] 98.8 °F (37.1 °C)  Pulse:  [73-86] 74  Resp:  [18] 18  SpO2:  [92 %-100 %] 92 %  BP: (164-220)/(100-130) 220/100     Weight: 54.8 kg (120 lb 13 oz)  Body mass index is 22.1 kg/m².    Intake/Output Summary (Last 24 hours) at 5/14/2019 1538  Last data filed at 5/14/2019 0630  Gross per 24 hour   Intake 94776.33 ml   Output 3656 ml   Net 90792.33 ml      Physical Exam   Constitutional: She is oriented to person, place, and time. She appears well-developed and well-nourished. No distress.   Sitting up comfortably in chair    HENT:   Head: Normocephalic.   Surgical site healing well   Neck: Neck supple.   Cardiovascular: Normal rate and regular rhythm.   Pulmonary/Chest: Effort normal and breath sounds normal.   Abdominal: There is no tenderness.   Distended    Neurological: She is alert and oriented to person, place, and time.   Skin: Skin is warm. No erythema.   Vitals reviewed.      Significant Labs: All pertinent labs within the past 24 hours have been reviewed.    Significant Imaging: I have reviewed all pertinent imaging results/findings within the past 24 hours.

## 2019-05-14 NOTE — PLAN OF CARE
05/14/19 1539   Discharge Reassessment   Assessment Type Discharge Planning Reassessment   Provided patient/caregiver education on the expected discharge date and the discharge plan Yes   Do you have any problems affording any of your prescribed medications? No   [facility])   Discharge Plan B Home with family

## 2019-05-14 NOTE — PLAN OF CARE
Problem: Adult Inpatient Plan of Care  Goal: Plan of Care Review  Review all meds given and plan of care

## 2019-05-14 NOTE — PLAN OF CARE
Problem: Occupational Therapy Goal  Goal: Occupational Therapy Goal  Goals to be met by: 5/16 (revised 5/9)    Patient will increase functional independence with ADLs by performing:    Pt will follow 95% of simple step commands for functional tasks.   Pt will verbally ID 3/3 items for ADL task with added time.   UE Dressing with Set-up Assistance and Supervision.  LE Dressing (pants, brief) with Set-up Assistance and CGA.   Grooming while standing with Set-up Assistance and Contact Guard Assistance.  Functional mobility at short household distance for ADL task CGA and no AD.         Outcome: Ongoing (interventions implemented as appropriate)  Goals remain appropriate. RENA Horta 5/14/2019

## 2019-05-14 NOTE — ASSESSMENT & PLAN NOTE
S/P clot evacuation and resection of L temporal lesion with cranioplasty on 4/22  Neurosurgery following  Patient neurologically stable on exam  Staples removed without complication. Patient tolerated removal well. OK to get incision wet.  Continue Keppra and Vimpat for seizure prevention  Maintain SBP < 160  Platelet goal of > 40 K per Dr. Powell  Please continue to hold any anticoagulation or DVT prophylaxis   Follow up with Dr. Powell in 2 weeks with head CT. NSGY will schedule this appt.    Per NS, an additional dose of keppra 500 mg to be given after HD on HD days  PTOT

## 2019-05-14 NOTE — PLAN OF CARE
05/14/19 1436   Post-Acute Status   Post-Acute Authorization Placement   Discharge Delays (!) Change in Medical Condition   OFELIA spoke with Piper at [facility] regarding pt's change in medical condition. OFELIA verified with medical team that pt may possibly DC late next week. OFELIA will continue to update Piper regarding pt's progress via [facility].    Katie Chin LMSW  [facility]  [phone]

## 2019-05-14 NOTE — ASSESSMENT & PLAN NOTE
BP Readings from Last 3 Encounters:   05/14/19 (!) 220/100   04/09/19 (!) 173/109   04/05/19 (!) 155/94     BPs elevated and maxed on all meds but imdur  Continue verapamil, irbesartan, clonidine, hydralazine, and carvedilol  imdur increased to 90 mg   Cont prn labetalol and hydralazine.

## 2019-05-14 NOTE — PROGRESS NOTES
ATTENDING NOTE, ONCOLOGY INPATIENT TEAM    events of last 24 hours noted.  Patient seen and examined, chart reviewed.  Appears comfortable, in NAD.  Lungs are clear to auscultation.  Abdomen is soft, nontender.  Labs reviewed.  Her platelet count ahs been stable at 34K for the last 48 hours.    I have discussed her case with her neurosurgeon (Dr. Powell), who stated that he would prefer that her platelets be kept at least at 40K.    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  Consider adding steroids; dexamethasone 40 mg daily x 4 days.  If above interventions do not result in an increase of her platelet count, would consider either embolization of her spleen or splenic radiation.    We will follow with you.          Jaison Henson MD

## 2019-05-14 NOTE — PLAN OF CARE
Problem: Physical Therapy Goal  Goal: Physical Therapy Goal  Goals to be met by: 19    Patient will increase functional independence with mobility by performin. Supine to sit with Set-up [place] - met   2. Sit to supine with Set-up [place]  3. Sit to stand transfer with Supervision - met   4. Gait  x 200 feet with Stand By Assistance using no assistive device  5. Lower extremity exercise program x30 reps per handout, with independence to improve muscular strength and endurance.         Outcome: Ongoing (interventions implemented as appropriate)  Goals reviewed and remain appropriate. Pt progressing towards goals.    Adriane Gooden, PT, DPT   2019  [phone]

## 2019-05-14 NOTE — PLAN OF CARE
05/14/19 1008   Post-Acute Status   Post-Acute Authorization Placement   Post-Acute Placement Status Referrals Sent   OFELIA sent updates to [facility] following this Pt via .       UPDATE:11:18 AM  SW left message for Ebony in admissions ([phone]) to verify if updates have been reviewed.    Katie Chin LMSW  [facility]  [phone]

## 2019-05-14 NOTE — PT/OT/SLP PROGRESS
"Speech Language Pathology Treatment    Patient Name:  Holly Patel   MRN:  [id number]  Admitting Diagnosis: Nontraumatic subcortical hemorrhage of left cerebral hemisphere    Recommendations:                 General Recommendations:  Speech/language therapy  Diet recommendations:  Regular, Liquid Diet Level: Thin   Aspiration Precautions: 1 bite/sip at a time, Alternating bites/sips, Avoid talking while eating, Eliminate distractions, Feed only when awake/alert, HOB to 90 degrees, Monitor for s/s of aspiration, Small bites/sips and Standard aspiration precautions   General Precautions: Standard, fall, seizure, dental soft  Communication strategies:  go to room if call light pushed; pt with aphasia    Subjective     Pt awake alert and cooperative   Pt nephew at the bedside     Pain/Comfort:   no pain pre/post     Objective:     Has the patient been evaluated by SLP for swallowing?   Yes  Keep patient NPO? No   Current Respiratory Status: room air      Pt remains oriented to self and . Pt continues to warrant mod-max cueing to orient to date and continues to perseverate on numerical value for month vs. word for month. Pt completed labeling tasks of pictures items w/ 20% acc independently . Pt persistently perseverating on "phone and remote" which were not items pictured. Pt demonstrated ability to match written words to pictured items w/ 75% acc independently. Pt demonstrated ability to read single words with 25% acc independently. Pt consistently reversing letters during reading attempts. Pt identified unsafe situations w/ 100% acc of pictured situations independently pt unable to report functional solutions as verbal responses where empty of proper nouns and perseverative from previous tasks (such as "oh  You have to put the phone thin in the thing").Speech will continue to follow.     Assessment:     Holly Patel is a 28 y.o. female with an SLP diagnosis of Aphasia and Cognitive-Linguistic " Impairment.      Goals:   Multidisciplinary Problems     SLP Goals        Problem: SLP Goal    Goal Priority Disciplines Outcome   SLP Goal     SLP Ongoing (interventions implemented as appropriate)   Description:  Goals to be met by 5/16:  1. Pt will tolerate diet of thin liquids and dental soft solids without overt clinical signs of aspiration   2. Pt will participate in trials of regular solids within speech therapy sessions to help determine least restrictive diet   3. Pt will demonstrate orientation to place, situation , family members, date w/ mod cues   4. Pt will generate 4 items per category w/ mod cues to enhance organizations skills   5. Pt will label items w/ 60%acc w/ mod cues to enhance verbal expression skills   6. Pt will participate in ongoing assessment of speech language and cognitive linguistic skills to help rule out deficits and determine therapeutic plan of care   7. Pt will complete three step commands with 80% acc given min A.                         Plan:     · Patient to be seen:  4 x/week   · Plan of Care expires:     · Plan of Care reviewed with:  patient   · SLP Follow-Up:  Yes       Discharge recommendations:  rehabilitation facility     Time Tracking:     SLP Treatment Date:   05/14/19  Speech Start Time:  0950  Speech Stop Time:  1004     Speech Total Time (min):  14 min    Billable Minutes: Speech Therapy Individual 14    Josselyn Medeiros CCC-SLP  05/14/2019 5/14/2019

## 2019-05-14 NOTE — ASSESSMENT & PLAN NOTE
Lab Results   Component Value Date    PLT 34 (LL) 05/14/2019     Platelets now in the mid 30s  No signs of bleeding.   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 if thrombocytopenia worsens, management could include splenic artery embolization vs. Radiation vs. Splenectomy  2. lack of plt response to transfusions is likely secondary to alloimunization  3. can transfuse for bleeding with plts less than 50 K or for lower treshold (such as 20-30K) even without bleeding    4. discussed her case with Dr. Powell; her platelet count is adequate and does not need to be raised.    Heme onc update given drop in platelets as of 5/13:  Follow CBC daily.  Check HIT panel (pending).  Repeat fibrinogen tomorrow.  If platelets continue to drop, would first discuss with the Neurosurgical Service to determine at what level they feel that an intervention is needed to raise her platelet count.  If they feel that her platelet needs to be raised, would consider steroids initially (dexamethasone 40 mg QD x 4 days) and also interventions to reduce her splenic sequestration sich as splenic embolization or splenic XRT. Splenectomy is probably not a very good option at this point.    Case discussed with NS, tuan onc and IR. Platelet goal of >40K. Will continue with dexamethasone x 4 days with platelet transfusion to be given today, 5/14. Should this fail, then will plan for IR embolization most likely 5/20 if patient in agreement.

## 2019-05-14 NOTE — PT/OT/SLP PROGRESS
Occupational Therapy   Treatment    Name: Holly Patel  MRN: [id number]  Admitting Diagnosis:  Nontraumatic subcortical hemorrhage of left cerebral hemisphere  22 Days Post-Op    Recommendations:     Discharge Recommendations: rehabilitation facility  Discharge Equipment Recommendations:  none  Barriers to discharge:  Other (Comment)(safety risk; level of skilled assistance required; remains in ICU)    Assessment:     Holly Patel is a 28 y.o. female with a medical diagnosis of Nontraumatic subcortical hemorrhage of left cerebral hemisphere.  She presents with aphasia and safety concerns as greatest deficits at this time. Moreover, she cont to required AD for mobility. She demo need for added assistance from her baseline for all ADLs/self care/mobility/IADLs at this time. Cont to rec rehab for d/c planning. Performance deficits affecting function are impaired endurance, impaired self care skills, impaired functional mobilty, gait instability, impaired balance, impaired cognition, impaired cardiopulmonary response to activity, other (comment)(aphasia).     Rehab Prognosis:  Good; patient would benefit from acute skilled OT services to address these deficits and reach maximum level of function.       Plan:     Patient to be seen 3 x/week to address the above listed problems via self-care/home management, therapeutic activities, therapeutic exercises, neuromuscular re-education, cognitive retraining  · Plan of Care Expires: 05/25/19  · Plan of Care Reviewed with: patient, family    Subjective     Pain/Comfort:  · Pain Rating 1: (c/o mild chest pain and abd discomfort throughout)  · Pain Addressed 1: Nurse notified, Reposition  · Pain Rating Post-Intervention 1: (remains)    Objective:     Communicated with: RN prior to session- reported she issued BP medication.  Patient found HOB elevated with bed alarm, telemetry, peripheral IV upon OT entry to room.    General Precautions: Standard, aphasia,  aspiration, fall, seizure   Orthopedic Precautions:N/A   Braces: N/A     Occupational Performance:   Bed Mobility:    · Patient completed Rolling/Turning to Right with modified independence and with side rail  · Patient completed Supine to Sit with minimum assistance and with side rail  · Patient completed Sit to Supine with contact guard assistance     Functional Mobility/Transfers:  · Patient completed Sit <> Stand Transfer with contact guard assistance  with  rolling walker   · Patient completed Bed <> Chair Transfer using Step Transfer technique with contact guard assistance with rolling walker  · Functional Mobility: household distance with min(A) and rolling walker    Activities of Daily Living:  · Lower Body Dressing: moderate assistance donning B socks while seated EOB (increased assistance 2/2 abd discomfort)    AMPA 6 Click ADL: 18    Treatment & Education:  -Pt alert and agreeable to therapy session  -BP at start of session: 178/111(140) pulse: 76  -pt agreeable to mobility only on this date 2/2 pain/discomfort   -requiring mod cues with mobility for sustained attention to tasks  -Communication board updated; questions/concerns addressed within OT scope of practice      Patient left HOB elevated with all lines intact, call button in reach and bed alarm onEducation:      GOALS:   Multidisciplinary Problems     Occupational Therapy Goals        Problem: Occupational Therapy Goal    Goal Priority Disciplines Outcome Interventions   Occupational Therapy Goal     OT, PT/OT Ongoing (interventions implemented as appropriate)    Description:  Goals to be met by: 5/16 (revised 5/9)    Patient will increase functional independence with ADLs by performing:    Pt will follow 95% of simple step commands for functional tasks.   Pt will verbally ID 3/3 items for ADL task with added time.   UE Dressing with Set-up Assistance and Supervision.  LE Dressing (pants, brief) with Set-up Assistance and CGA.   Grooming while  standing with Set-up Assistance and Contact Guard Assistance.  Functional mobility at short household distance for ADL task CGA and no AD.                          Time Tracking:     OT Date of Treatment: 05/14/19  OT Start Time: 1015  OT Stop Time: 1027  OT Total Time (min): 12 min    Billable Minutes:Therapeutic Activity 12    RENA Horta  5/14/2019

## 2019-05-15 NOTE — PROGRESS NOTES
Progress Note    SUBJECTIVE:   The pt had been transported to dialysis this morning. Review of record reveals that received dexamethasone x 40 as well as one unit of plts yesterday.     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 ()    Moderate protein-calorie malnutrition 2017    MRSA bacteremia 2017    Pneumonia     Prophylactic immunotherapy 2014    Renovascular hypertension 10/2/2015    Secondary hyperparathyroidism 2017    Seizures     Sialadenitis 3/21/2018    Thrombocytopenia 2016     Past Surgical History:   Procedure Laterality Date    BIOPSY, LIVER, TRANSJUGULAR APPROACH N/A 2018    Performed by [facility] at [facility] OR 2ND FLR    BIOPSY-LIVER N/A 2017    Performed by [facility] at [facility] OR 2ND FLR    BIOPSY-LIVER N/A 3/22/2016    Performed by [facility] at [facility] OR 2ND FLR     SECTION      x 2    CONIZATION-CERVICAL-LEEP N/A 6/15/2018    Performed by [facility] at [facility] OR    GIIARMIGYJNG-OMOIHLI-NB; upper extremity Left 2015    Performed by Idalia Diaz MD at [facility] OR  2ND FLR    CRANIOPLASTY  4/22/2019    Performed by Jose Luis Powell [facility] at [facility] OR 2ND FLR    CRANIOTOMY-- left crani for clot evac with microscrope Left 4/22/2019    Performed by Jose Luis Powell MD at [facility] OR 2ND FLR    EMBOLIZATION, BLOOD VESSEL N/A 7/21/2018    Performed by [facility]ren Ramos MD at [facility] CATH LAB    Exam Under Anesthesia N/A 8/8/2018    Performed by [facility]elam [facility]roquin MD at [facility] OR 2ND FLR    Exam under anesthesia N/A 6/19/2018    Performed by [facility]quin [facility] at [facility] OR    Exam under anesthesia (ADD ON ) N/A 7/9/2018    Performed by NICKIE Alvarez MD at [facility] OR    Exam under anesthesia -cervical suturing  N/A 7/26/2018    Performed by Lei Sims III, MD at [facility] OR    EXCISION, NEOPLASM, SKULL with Cranioplasty Left 4/22/2019    Performed by Jose Luis Powell MD at [facility] OR 2ND FLR    FISTULOGRAM Left 12/4/2015    Performed by Idalia Diaz MD at [facility] CATH LAB    LIVER BIOPSY      LIVER TRANSPLANT  09/1992    PARATHYROIDECTOMY, Minimally Invasive Bilateral Exploration Bilateral 3/27/2019    Performed by Ashley Guallpa MD at [facility] OR 2ND FLR    SUTURE REPAIR,CERVIX  6/19/2018    Performed by [facility]quin [facility] at [facility] OR    TUBAL LIGATION  2010     Family History   Problem Relation Age of Onset    Hypertension Mother     Hypertension Father     Cancer Sister     Heart attack Maternal Uncle     Melanoma Neg Hx     Breast cancer Neg Hx     Colon cancer Neg Hx     Ovarian cancer Neg Hx      Social History     Tobacco Use    Smoking status: Never Smoker    Smokeless tobacco: Never Used   Substance Use Topics    Alcohol use: No    Drug use: No     Review of Systems   Constitutional: Negative for chills and fever.   Respiratory: Negative for shortness of breath.    Cardiovascular: Negative for chest pain.   Gastrointestinal: Positive for abdominal pain. Negative for nausea and vomiting.   Skin: Negative for rash.   Neurological: Negative for seizures  and loss of consciousness.     OBJECTIVE:     Vital Signs:  Temp:  [97 °F (36.1 °C)-97.4 °F (36.3 °C)]   Pulse:  [68-79]   Resp:  [18]   BP: (188-202)/(110-127)   SpO2:  [93 %-96 %]     Physical Exam   Constitutional: She is oriented to person, place, and time. She appears well-developed and well-nourished.   HENT:   Head: Normocephalic and atraumatic.   Eyes: Pupils are equal, round, and reactive to light. Conjunctivae and EOM are normal.   Neck: Normal range of motion. Neck supple.   Cardiovascular: Normal rate, regular rhythm and normal heart sounds. Exam reveals no gallop and no friction rub.   No murmur heard.  Pulmonary/Chest: Effort normal and breath sounds normal. No respiratory distress. She has no wheezes. She has no rales.   Abdominal: Soft. Bowel sounds are normal. She exhibits distension. There is no tenderness. There is no guarding.   Distended abdomen; enlarged spleen   Musculoskeletal: Normal range of motion.   Lymphadenopathy:     She has no cervical adenopathy.   Neurological: She is alert and oriented to person, place, and time. No cranial nerve deficit.   Skin: Skin is warm and dry.   dilaysis access in L arm     Laboratory:  CBC:   Recent Labs   Lab 05/15/19  0403   WBC 3.19*   RBC 2.80*   HGB 7.6*   HCT 23.3*   PLT 47*   MCV 83   MCH 27.1   MCHC 32.6     BMP:   Recent Labs   Lab 05/15/19  0402   *      K 4.8      CO2 24   BUN 30*   CREATININE 5.7*   CALCIUM 10.2     CMP:   Recent Labs   Lab 05/15/19  0402   *   CALCIUM 10.2   ALBUMIN 2.8*   PROT 7.2      K 4.8   CO2 24      BUN 30*   CREATININE 5.7*   ALKPHOS 572*   ALT 25   AST 35   BILITOT 0.6     LFTs:   Recent Labs   Lab 05/15/19  0402   ALT 25   AST 35   ALKPHOS 572*   BILITOT 0.6   PROT 7.2   ALBUMIN 2.8*     Coagulation:   Recent Labs   Lab 05/15/19  0402   LABPROT 11.8   INR 1.2       Diagnostic Results:      ASSESSMENT/PLAN:     Plan:    Acute on chronicThrombocytopenia  - plts today improved 47 K  today; s/p 1 U of plts on 5/14  - noted to have a spleen previously of 19.7 cm on US Abdomen on 10/23/18  - IR consulted for possible splenic artery embolization later this week   -  chronically, the plts have been noted to be low since 01/2015, with plt counts  Ranging from 40 K to 122 K.  - On day of her surgery, the pt was noted to have plt count of 56 K and pt has been flucutating in between before trending downwards  -  pt had 14plt units, 1 cryo, 1 prBC during this admission; the plt transfusion involved 2 units of plts on 5/8/19.  - peripheral smear does not reveal schisctocytes, giant plts, or plt clumping  - low plts chronically likely secondary to splenic sequestration  - H2 blockers stopped  - HIT antibody negative  - lack of complete plt response previously to transfusions is likely secondary to alloimunization    To be discussed with Dr. Cassius Wayne MD  Hematology/Oncology Fellow, PGY IV  [facility]        ATTENDING NOTE, HEMATOLOGY CONSULT TEAM    As above; events of last 24 hours noted.  Patient seen and examined, chart reviewed.  Appears comfortable, in NAD.  Lungs are clear to auscultation.  Abdomen is soft, nontender.  Labs reviewed.  Her platelet count today is 47,000 /mm3, after receiving one unit of platelets yesterday    RECOMMEND  Continue dexamethasone 40 mg QD x 3 more days.    It is OK to transfuse platelets if her count drops below 40K. However, it is unpredicable whether she will respond to platelet transfusions; I suspect she has been alloimmunized due to multiple prior transfusions.  If platelets cannot be maintained above 40K, would recommend splenic embolization.  We will follow with you.  Her multiple questions were answered to her satisfaction.        Jaison Henson MD

## 2019-05-15 NOTE — PLAN OF CARE
Problem: SLP Goal  Goal: SLP Goal  Goals to be met by 5/16:  1. Pt will tolerate diet of thin liquids and dental soft solids without overt clinical signs of aspiration   2. Pt will participate in trials of regular solids within speech therapy sessions to help determine least restrictive diet   3. Pt will demonstrate orientation to place, situation , family members, date w/ mod cues   4. Pt will generate 4 items per category w/ mod cues to enhance organizations skills   5. Pt will label items w/ 60%acc w/ mod cues to enhance verbal expression skills   6. Pt will participate in ongoing assessment of speech language and cognitive linguistic skills to help rule out deficits and determine therapeutic plan of care   7. Pt will complete three step commands with 80% acc given min A.          Pt with slow progress towards goals     Josselyn Medeiros MS, CCC-SLP  Speech Language Pathologist  Pager: [phone]  Date 5/15/2019

## 2019-05-15 NOTE — PROGRESS NOTES
[facility] Hospital Medicine  Progress Note    Patient Name: Holly Patel  MRN: [id number]  Patient Class: IP- Inpatient   Admission Date: 4/22/2019  Length of Stay: 23 days  Attending Physician: Zeenat Almanza MD  Primary Care Provider: Stan Sosa MD    Hospital Medicine Team: [facility] HOSP MED G Zeenat Almanza MD    Subjective:     Principal Problem:Nontraumatic subcortical hemorrhage of left cerebral hemisphere    HPI:  29 yo F w/ PMH significant for liver transplant in 1991, recent thyroidectomy on 3/27/2019, ESRD on HD (MWF), and epilepsy who presents to [facility] for higher level of care following planned surgical excision of L calvarial lesion s/p excision and cranioplasty 4/22/19. The pt presented to [facility] on 03/12/2019 with a complaint of headaches, among other symptoms, and received a workup that included a CT head which showed a left temporal calvarium lesion with mass effect. At that time she shared that she continued to have a headache, noting the pain was usually over the left temporal aspect of her head but migrated over to the right temporal aspect. She states the pain on the right is exacerbated with palpation of the area. She has no additional symptomatic complaints at this time. Currently stable following surgery. Denies acute pain, otherwise comfortable w/ non-focal neurological exam.      Hospital Course:  No notes on file    Interval History: Seen on HD today. Feels well. Platelets responded to transfusion. No evidence of bleeding.     Review of Systems   Constitutional: Negative for chills and fever.   HENT: Negative.    Eyes: Negative for visual disturbance.   Respiratory: Negative for cough, chest tightness and shortness of breath.    Cardiovascular: Negative for chest pain.   Gastrointestinal: Positive for abdominal distention. Negative for nausea and vomiting.   Musculoskeletal: Negative.    Neurological: Negative for light-headedness, numbness and headaches.      Objective:     Vital Signs (Most Recent):  Temp: 97 °F (36.1 °C) (05/15/19 0813)  Pulse: 68 (05/15/19 1125)  Resp: 18 (05/15/19 0813)  BP: (!) 202/127 (05/15/19 0813)  SpO2: 96 % (05/15/19 0813) Vital Signs (24h Range):  Temp:  [96.8 °F (36 °C)-98.4 °F (36.9 °C)] 97 °F (36.1 °C)  Pulse:  [68-84] 68  Resp:  [18] 18  SpO2:  [93 %-100 %] 96 %  BP: (173-202)/(102-127) 202/127     Weight: 54.8 kg (120 lb 13 oz)  Body mass index is 22.1 kg/m².    Intake/Output Summary (Last 24 hours) at 5/15/2019 1247  Last data filed at 5/14/2019 2300  Gross per 24 hour   Intake 360 ml   Output 0 ml   Net 360 ml      Physical Exam   Constitutional: She is oriented to person, place, and time. She appears well-developed and well-nourished. No distress.   Lying comfortably. On HD.    HENT:   Head: Normocephalic.   Surgical site healing well   Neck: Neck supple.   Cardiovascular: Normal rate and regular rhythm.   Pulmonary/Chest: Effort normal and breath sounds normal.   Abdominal: There is no tenderness.   Distended    Neurological: She is alert and oriented to person, place, and time.   Skin: Skin is warm. No erythema.   Vitals reviewed.      Significant Labs: All pertinent labs within the past 24 hours have been reviewed.    Significant Imaging: I have reviewed all pertinent imaging results/findings within the past 24 hours.    Assessment/Plan:      * Nontraumatic subcortical hemorrhage of left cerebral hemisphere  S/P clot evacuation and resection of L temporal lesion with cranioplasty on 4/22  Neurosurgery following  Patient neurologically stable on exam  Staples removed without complication. Patient tolerated removal well. OK to get incision wet.  Continue Keppra and Vimpat for seizure prevention  Maintain SBP < 160  Platelet goal of > 40 K per Dr. Powell  Please continue to hold any anticoagulation or DVT prophylaxis   Follow up with Dr. Powell in 2 weeks with head CT. NSGY will schedule this appt.    Per NS, an additional dose of  "keppra 500 mg to be given after HD on HD days  PTOT    Brain tumor  Per above       Hypocalcemia  Lab Results   Component Value Date    CALCIUM 10.3 05/12/2019    PHOS 1.4 (L) 05/12/2019     Continue calcium carbonate and calcitriol  HD as scheduled      Thrombocytopenia  Lab Results   Component Value Date    PLT 47 (L) 05/15/2019     Platelets now in the mid 30s  No signs of bleeding.   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 if thrombocytopenia worsens, management could include splenic artery embolization vs. Radiation vs. Splenectomy  2. lack of plt response to transfusions is likely secondary to alloimunization  3. can transfuse for bleeding with plts less than 50 K or for lower treshold (such as 20-30K) even without bleeding    4. discussed her case with Dr. Powell; her platelet count is adequate and does not need to be raised.    Heme onc update given drop in platelets as of 5/13:  Follow CBC daily.  Check HIT panel (pending).  Repeat fibrinogen tomorrow.  If platelets continue to drop, would first discuss with the Neurosurgical Service to determine at what level they feel that an intervention is needed to raise her platelet count.  If they feel that her platelet needs to be raised, would consider steroids initially (dexamethasone 40 mg QD x 4 days) and also interventions to reduce her splenic sequestration sich as splenic embolization or splenic XRT. Splenectomy is probably not a very good option at this point.    5/14: Case discussed with NS, heme onc and IR. Platelet goal of >40K. Will continue with dexamethasone x 4 days with platelet transfusion to be given today, 5/14. Should this fail, then will plan for IR embolization most likely 5/20 if patient in agreement.     5/15: Platelets responded to 47K after transfusion. Patient stated to me that she "has to do what needs to be done" if embolization is needed. Will continue to monitor platelet response on dex " until last dose scheduled to end on 5/17.          Seizures  Cont home meds of vimpat and keppra    Moderate protein-calorie malnutrition  Cont current diet  Encourage oral intake    Secondary hyperparathyroidism        Immunosuppressed  Per liver transplant       Renovascular hypertension  BP Readings from Last 3 Encounters:   05/14/19 (!) 220/100   04/09/19 (!) 173/109   04/05/19 (!) 155/94     BPs elevated and maxed on all meds but imdur  Continue verapamil, irbesartan, clonidine, hydralazine, and carvedilol  imdur increased to 90 mg   Cont prn labetalol and hydralazine.     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

## 2019-05-15 NOTE — PT/OT/SLP PROGRESS
"Speech Language Pathology Treatment    Patient Name:  Holly Patel   MRN:  [id number]  Admitting Diagnosis: Nontraumatic subcortical hemorrhage of left cerebral hemisphere    Recommendations:                 General Recommendations:  Speech/language therapy  Diet recommendations:  Regular, Liquid Diet Level: Thin   Aspiration Precautions: 1 bite/sip at a time, Alternating bites/sips, Avoid talking while eating, Eliminate distractions, Feed only when awake/alert, HOB to 90 degrees, Monitor for s/s of aspiration, Small bites/sips and Standard aspiration precautions   General Precautions: Standard, fall, seizure, dental soft  Communication strategies:  go to room if call light pushed; pt with aphasia    Subjective     Pt awake alert and cooperative   Pt nephew at the bedside     Pain/Comfort:  Pain Rating 1: 0/10  Pain Rating Post-Intervention 1: 0/10no pain pre/post     Objective:     Has the patient been evaluated by SLP for swallowing?   Yes  Keep patient NPO? No   Current Respiratory Status: room air      Pt remains oriented to self and . Pt continues to warrant mod-max cueing to orient to name of month.  Pt persistently perseverating on "cereal" during category naming tasks. Pt demonstrated ability to name 1 item per concrete category independently. With SLP cueing and phonemic and semantic verbal cues pt continued with perseverative responses, difficult to re-direct, neologisms and phonemic paraphasias such as"chilletto" for "chicken."  Pt reporting fatigue from dialysis earlier this date .Speech will continue to follow.     Assessment:     Holly Patel is a 28 y.o. female with an SLP diagnosis of Aphasia and Cognitive-Linguistic Impairment.      Goals:   Multidisciplinary Problems     SLP Goals        Problem: SLP Goal    Goal Priority Disciplines Outcome   SLP Goal     SLP Ongoing (interventions implemented as appropriate)   Description:  Goals to be met by :  1. Pt will tolerate diet of " thin liquids and dental soft solids without overt clinical signs of aspiration   2. Pt will participate in trials of regular solids within speech therapy sessions to help determine least restrictive diet   3. Pt will demonstrate orientation to place, situation , family members, date w/ mod cues   4. Pt will generate 4 items per category w/ mod cues to enhance organizations skills   5. Pt will label items w/ 60%acc w/ mod cues to enhance verbal expression skills   6. Pt will participate in ongoing assessment of speech language and cognitive linguistic skills to help rule out deficits and determine therapeutic plan of care   7. Pt will complete three step commands with 80% acc given min A.                         Plan:     · Patient to be seen:  4 x/week   · Plan of Care expires:     · Plan of Care reviewed with:  patient   · SLP Follow-Up:  Yes       Discharge recommendations:  rehabilitation facility     Time Tracking:     SLP Treatment Date:   05/15/19  Speech Start Time:  1502  Speech Stop Time:  1515     Speech Total Time (min):  13 min    Billable Minutes: Speech Therapy Individual 13    Josselyn Medeiros CCC-SLP  05/15/2019     5/15/2019

## 2019-05-15 NOTE — PROGRESS NOTES
Received pt via stretcher to unit upon arrival /108   HR 75. LFA fistula +bruit/thrill accessed w/ 15g needles w/o difficulty lines secured   Maintenance HD (MWF) started @ 0911  UF GOAL 3L       PRE TX HD WT 58.9kg

## 2019-05-15 NOTE — SUBJECTIVE & OBJECTIVE
Interval History: Seen on HD today. Feels well. Platelets responded to transfusion. No evidence of bleeding.     Review of Systems   Constitutional: Negative for chills and fever.   HENT: Negative.    Eyes: Negative for visual disturbance.   Respiratory: Negative for cough, chest tightness and shortness of breath.    Cardiovascular: Negative for chest pain.   Gastrointestinal: Positive for abdominal distention. Negative for nausea and vomiting.   Musculoskeletal: Negative.    Neurological: Negative for light-headedness, numbness and headaches.     Objective:     Vital Signs (Most Recent):  Temp: 97 °F (36.1 °C) (05/15/19 0813)  Pulse: 68 (05/15/19 1125)  Resp: 18 (05/15/19 0813)  BP: (!) 202/127 (05/15/19 0813)  SpO2: 96 % (05/15/19 0813) Vital Signs (24h Range):  Temp:  [96.8 °F (36 °C)-98.4 °F (36.9 °C)] 97 °F (36.1 °C)  Pulse:  [68-84] 68  Resp:  [18] 18  SpO2:  [93 %-100 %] 96 %  BP: (173-202)/(102-127) 202/127     Weight: 54.8 kg (120 lb 13 oz)  Body mass index is 22.1 kg/m².    Intake/Output Summary (Last 24 hours) at 5/15/2019 1247  Last data filed at 5/14/2019 2300  Gross per 24 hour   Intake 360 ml   Output 0 ml   Net 360 ml      Physical Exam   Constitutional: She is oriented to person, place, and time. She appears well-developed and well-nourished. No distress.   Lying comfortably. On HD.    HENT:   Head: Normocephalic.   Surgical site healing well   Neck: Neck supple.   Cardiovascular: Normal rate and regular rhythm.   Pulmonary/Chest: Effort normal and breath sounds normal.   Abdominal: There is no tenderness.   Distended    Neurological: She is alert and oriented to person, place, and time.   Skin: Skin is warm. No erythema.   Vitals reviewed.      Significant Labs: All pertinent labs within the past 24 hours have been reviewed.    Significant Imaging: I have reviewed all pertinent imaging results/findings within the past 24 hours.

## 2019-05-15 NOTE — PROGRESS NOTES
Spoke with family about stroke education, Family states that they dont have any questions. Reinstructed on s/s of stroke and when to call 911 and compliance with diet and medications. Family verbalized understanding.

## 2019-05-15 NOTE — ASSESSMENT & PLAN NOTE
"Lab Results   Component Value Date    PLT 47 (L) 05/15/2019     Platelets now in the mid 30s  No signs of bleeding.   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 if thrombocytopenia worsens, management could include splenic artery embolization vs. Radiation vs. Splenectomy  2. lack of plt response to transfusions is likely secondary to alloimunization  3. can transfuse for bleeding with plts less than 50 K or for lower treshold (such as 20-30K) even without bleeding    4. discussed her case with Dr. Powell; her platelet count is adequate and does not need to be raised.    Heme onc update given drop in platelets as of 5/13:  Follow CBC daily.  Check HIT panel (pending).  Repeat fibrinogen tomorrow.  If platelets continue to drop, would first discuss with the Neurosurgical Service to determine at what level they feel that an intervention is needed to raise her platelet count.  If they feel that her platelet needs to be raised, would consider steroids initially (dexamethasone 40 mg QD x 4 days) and also interventions to reduce her splenic sequestration sich as splenic embolization or splenic XRT. Splenectomy is probably not a very good option at this point.    5/14: Case discussed with NS, heme onc and IR. Platelet goal of >40K. Will continue with dexamethasone x 4 days with platelet transfusion to be given today, 5/14. Should this fail, then will plan for IR embolization most likely 5/20 if patient in agreement.     5/15: Platelets responded to 47K after transfusion. Patient stated to me that she "has to do what needs to be done" if embolization is needed. Will continue to monitor platelet response on dex until last dose scheduled to end on 5/17.        "

## 2019-05-15 NOTE — PT/OT/SLP PROGRESS
Physical Therapy      Patient Name:  Holly Patel   MRN:  [id number]    Pt off floor for dialysis. Will attempt per schedule.       Shama Gant, PT, DPT  5/15/2019

## 2019-05-15 NOTE — PHYSICIAN QUERY
PT Name: Holly Patel  MR #: [id number]     Physician Query Form - Etiology of Condition Clarification      CDS/: Jessie ROBERTSON, RN               Contact information: [email or web address]  This form is a permanent document in the medical record.     Query Date: May 15, 2019    By submitting this query, we are merely seeking further clarification of documentation.  Please utilize your independent clinical judgment when addressing the question(s) below.     The medical record contains the following:    Findings Supporting Clinical Information Location in Medical Record     Encephalopathy   27 yo female w liver tx and esrd s/p resection of calvarial mass complicated by ich.      -arousal and encephalopathy improving, continues to be confused and encephalopathic      IMPRESSION:  Markedly abnormal EEG with diffuse slowing noted in the daytime   indicative of a diffuse encephalopathy, nonspecific.     Principal Problem:Nontraumatic subcortical hemorrhage of left cerebral hemisphere       Neuro Critical Care progress 5/4/19            EEG 4/29/19          Hospital Medicine progress 5/14/19     Please document your best medical opinion regarding the etiology of __Encephalopathy ___ for which treatment is/was directed.     Provider use only     [x  ] Encephalopathy likely due to Nontraumatic subcortical hemorrhage of left cerebral hemisphere     [  ] Encephalopathy likely due to resection of calvarial mass on 4/22/19     [  ] Other diagnosis (please specify): __________________________________         [  ] Clinically Undetermined     Please document in your progress notes daily for the duration of treatment, until resolved, and include in your discharge summary.

## 2019-05-15 NOTE — PLAN OF CARE
Problem: Adult Inpatient Plan of Care  Goal: Plan of Care Review  Review  Daily plan of care , bp management , safety measures review meds given

## 2019-05-15 NOTE — PROGRESS NOTES
ESRD on HD  Corrected Ca 11.16  Ca to adjusted in dialysate bath   Hold calcitriol  Hold Ca carbonate  Repeat Ph  Ph replacement noted on order    HTN:  Admitted with Malignant HTN  Per MAR, BP 0441 192/114  Per MAR, last medicated with PRN dose of labetalol 0052  Per MAR, hydralazine and labetolol available PRN SBP >160  Nursing to monitor and medicate according to parameters  Noted the same on last HD 5/13 (and asked HD RN to notify floor RN to medicate with BP meds prior to HD  Note the same of high BPs with PRN doses available to be given yesterday and discussed with nursing; discussed with Hospital Medicine  Nursing to medicate with BP meds when appropriate and continue to monitor BP  Please medicate with available meds today prior to HD today

## 2019-05-15 NOTE — NURSING
Critical phosphorus of less than 1.0 reported to this writer by lab and conveyed to primary team IMG. Orders for supplementation given by Mateo SHELTON and initiated.

## 2019-05-15 NOTE — PROGRESS NOTES
Per NP pt is to be premedicated with all scheduled AM blood pressure medications prior to coming to Dialysis. Spoke with Marva PRUITT pt has received all scheduled AM meds for BP @ 0800.

## 2019-05-15 NOTE — PROGRESS NOTES
Maintenance HD completed @ 1243 blood returned and 15g needles removed and pressure held [id number] to each site until hemostasis achieved. LFA fistula +bruit/thrill.   UF REMOVED 1.8L      POST HD TX WT: 56.8kg  POST HD /77 HR 70  Report called to nurse pt off unit to floor via stretcher

## 2019-05-15 NOTE — PLAN OF CARE
Problem: Adult Inpatient Plan of Care  Goal: Plan of Care Review  Assessment and Plan  Nutrition Problem  Inadequate oral intake     Related to (etiology):   Dislike of renal diet     Signs and Symptoms (as evidenced by):   Pt eating <75% meals as she is tired of renal meal options.      Interventions/Recommendations (treatment strategy):  Collaboration of care.     Nutrition Diagnosis Status:   Continues     Recommendations     Recommendation: 1. Continue renal diet. 2. Should renal labs improve, change to Cardiac diet.  Goals: 1. Pt's intake meals >50% x 7 days.  Nutrition Goal Status: progressing towards goal  Communication of RD Recs: (POC)

## 2019-05-15 NOTE — PLAN OF CARE
Problem: Adult Inpatient Plan of Care  Goal: Plan of Care Review  Review daily plan of care and HTN managment

## 2019-05-15 NOTE — PROGRESS NOTES
"[facility]  Adult Nutrition  Progress Note    SUMMARY       Recommendations    Recommendation: 1. Continue renal diet. 2. Should renal labs improve, change to Cardiac diet.  Goals: 1. Pt's intake meals >50% x 7 days.  Nutrition Goal Status: progressing towards goal  Communication of RD Recs: (POC)    Reason for Assessment    Reason For Assessment: RD follow-up  Diagnosis: hemorrhage  Relevant Medical History: liver tx, ESRD on HD, epilepsy  Interdisciplinary Rounds: attended  General Information Comments: Pt continues with fair intake renal diet. Requested to liberalize diet to Cardiac for better po intake, MD did not take action. Pt off unit, unable to reassess. Will follow.  Nutrition Discharge Planning: adequate po intake for optimal nutrition with Renal restriction    Nutrition Risk Screen    Nutrition Risk Screen: no indicators present    Nutrition/Diet History    Spiritual, Cultural Beliefs, Buddhism Practices, Values that Affect Care: no  Factors Affecting Nutritional Intake: chewing difficulties/inability to chew food    Anthropometrics    Temp: 97 °F (36.1 °C)  Height Method: Measured  Height: 5' 2" (157.5 cm)  Height (inches): 62 in  Weight Method: Bed Scale  Weight: 54.8 kg (120 lb 13 oz)  Weight (lb): 120.81 lb  Ideal Body Weight (IBW), Female: 110 lb  % Ideal Body Weight, Female (lb): 109.83 lb  BMI (Calculated): 22.1  BMI Grade: 18.5-24.9 - normal       Lab/Procedures/Meds    Pertinent Labs Reviewed: reviewed  Pertinent Labs Comments: H/H 7.6/23.3, BUN 30,. Creat 5.7, Glu 213, Alk phos 572, Alb 2.8  Pertinent Medications Reviewed: reviewed  Pertinent Medications Comments: dexamethasone, pantoprazole, prograrf    Estimated/Assessed Needs    Weight Used For Calorie Calculations: 54.8 kg (120 lb 13 oz)  Energy Calorie Requirements (kcal): 1918  Energy Need Method: Kcal/kg(35kcal/kg)  Protein Requirements: 66-77g(1.2-1.4g/kg)  Weight Used For Protein Calculations: 54.8 kg (120 lb 13 " oz)  Fluid Requirements (mL): 1mL/kcal or per MD     RDA Method (mL): 1918         Nutrition Prescription Ordered    Current Diet Order: renal diet    Evaluation of Received Nutrient/Fluid Intake    I/O: +11.9L since admission  Comments: LBM 5/12  % Intake of Estimated Energy Needs: 50 - 75 %  % Meal Intake: 50 - 75 %    Nutrition Risk    Level of Risk/Frequency of Follow-up: low     Assessment and Plan  Nutrition Problem  Inadequate oral intake    Related to (etiology):   Dislike of renal diet    Signs and Symptoms (as evidenced by):   Pt eating <75% meals as she is tired of renal meal options.     Interventions/Recommendations (treatment strategy):  Collaboration of care.    Nutrition Diagnosis Status:   Continues           Monitor and Evaluation    Food and Nutrient Intake: energy intake, food and beverage intake  Food and Nutrient Adminstration: diet order  Anthropometric Measurements: weight, weight change, body mass index  Biochemical Data, Medical Tests and Procedures: electrolyte and renal panel, gastrointestinal profile, glucose/endocrine profile, inflammatory profile, lipid profile  Nutrition-Focused Physical Findings: overall appearance     Malnutrition Assessment                 Orbital Region (Subcutaneous Fat Loss): well nourished  Upper Arm Region (Subcutaneous Fat Loss): well nourished  Thoracic and Lumbar Region: well nourished   [place]azid[place] Region (Muscle Loss): mild depletion  Clavicle Bone Region (Muscle Loss): mild depletion  Patellar Region (Muscle Loss): well nourished  Anterior Thigh Region (Muscle Loss): well nourished  Posterior Calf Region (Muscle Loss): mild depletion                 Nutrition Follow-Up    RD Follow-up?: Yes

## 2019-05-15 NOTE — PROGRESS NOTES
Hemodialysis Note  Pt seen and evaluated while undergoing dialysis. Tolerating dialysis with current UFR, without complications. Labs reviewed and dialysate bath adjusted.     BFR: 400  UF goal: 2.3L as tolerated; IDWG 2.3  Anemia: Hgb 7.9; iSat 21/ferritin 905; hold epogen; will give venofer 100 mg x 5 doses on HD    MBD:   Corrected Ca 11.16  Ca to adjusted in dialysate bath   Hold calcitriol  Hold Ca carbonate  Continue Ph replacement   Ph Q4    Diet: renal    HTN:  Admitted with Malignant HTN  Per MAR, pt with scheduled BP meds  Per MAR, hydralazine and labetolol available PRN SBP >160  Pt was medicated with scheduled BP meds today prior to HD per NP request  BPs trended down on HD: currently at 133/79  Nursing to continue to monitor and medicate according to parameters  Managed by Hospital Medicine

## 2019-05-16 NOTE — SUBJECTIVE & OBJECTIVE
Interval History: No acute events. No new complaints. Platelets responding to current therapy. No evidence of bleeding. Blood pressure remains uncontrolled. Consulting cardiology for assistance.     Review of Systems   Constitutional: Negative for chills and fever.   HENT: Negative.    Eyes: Negative for visual disturbance.   Respiratory: Negative for cough, chest tightness and shortness of breath.    Cardiovascular: Negative for chest pain.   Gastrointestinal: Positive for abdominal distention. Negative for nausea and vomiting.   Musculoskeletal: Negative.    Neurological: Negative for light-headedness, numbness and headaches.     Objective:     Vital Signs (Most Recent):  Temp: 97.8 °F (36.6 °C) (05/16/19 0811)  Pulse: 85 (05/16/19 0811)  Resp: 18 (05/16/19 0811)  BP: (!) 173/111 (05/16/19 0811)  SpO2: 96 % (05/16/19 0811) Vital Signs (24h Range):  Temp:  [97.8 °F (36.6 °C)-98.7 °F (37.1 °C)] 97.8 °F (36.6 °C)  Pulse:  [65-89] 85  Resp:  [16-19] 18  SpO2:  [94 %-96 %] 96 %  BP: (124-216)/() 173/111     Weight: 54.8 kg (120 lb 13 oz)  Body mass index is 22.1 kg/m².    Intake/Output Summary (Last 24 hours) at 5/16/2019 0932  Last data filed at 5/15/2019 1215  Gross per 24 hour   Intake 0 ml   Output 1864 ml   Net -1864 ml      Physical Exam   Constitutional: She is oriented to person, place, and time. She appears well-developed and well-nourished. No distress.   Lying comfortably. On HD.    HENT:   Head: Normocephalic.   Surgical site healing well   Neck: Neck supple.   Cardiovascular: Normal rate and regular rhythm.   Pulmonary/Chest: Effort normal and breath sounds normal.   Abdominal: There is no tenderness.   Distended    Neurological: She is alert and oriented to person, place, and time.   Skin: Skin is warm. No erythema.   Vitals reviewed.      Significant Labs: All pertinent labs within the past 24 hours have been reviewed.    Significant Imaging: I have reviewed all pertinent imaging results/findings  within the past 24 hours.

## 2019-05-16 NOTE — PT/OT/SLP PROGRESS
"Speech Language Pathology Treatment    Patient Name:  Holly Patel   MRN:  [id number]  Admitting Diagnosis: Nontraumatic subcortical hemorrhage of left cerebral hemisphere    Recommendations:                 General Recommendations:  Speech/language therapy  Diet recommendations:  Regular, Liquid Diet Level: Thin   Aspiration Precautions: 1 bite/sip at a time, Alternating bites/sips, Avoid talking while eating, Eliminate distractions, Feed only when awake/alert, HOB to 90 degrees, Monitor for s/s of aspiration, Small bites/sips and Standard aspiration precautions   General Precautions: Standard, fall, seizure, dental soft  Communication strategies:  go to room if call light pushed; pt with aphasia    Subjective     Pt awake alert and cooperative   Pt daughter and mother at the bedside     Pain/Comfort:  Pain Rating 1: 0/10  Pain Rating Post-Intervention 1: 0/10no pain pre/post     Objective:     Has the patient been evaluated by SLP for swallowing?   Yes  Keep patient NPO? No   Current Respiratory Status: room air      Pt seen in conjunction with OT for ongoing therapy paired with multi-tasking. Pt presents with evident right sided inattention when attempting to multi-task. Pt naming colors and additional items w/ 60% acc. Pt persistently perseverating on "yellow" during category naming tasks. Pt benefits greatly from semantic descriptions and phonemic cueing. Pt read simple words 0/3 independently and increased to 3/3 w/ SLP max cueing.  Pt demonstrated ability to name 2 items per concrete category independently. Pt verbal responses continue to be characterized by  neologisms and phonemic paraphasias such as"falcase" for "plane."  Speech will continue to follow.     Assessment:     Holly Patel is a 28 y.o. female with an SLP diagnosis of Aphasia and Cognitive-Linguistic Impairment.      Goals:   Multidisciplinary Problems     SLP Goals        Problem: SLP Goal    Goal Priority Disciplines Outcome "   SLP Goal     SLP Ongoing (interventions implemented as appropriate)   Description:  Goals to be met by 5/23:  1. Pt will tolerate diet of thin liquids and dental soft solids without overt clinical signs of aspiration   2. Pt will participate in trials of regular solids within speech therapy sessions to help determine least restrictive diet   3. Pt will demonstrate orientation to place, situation , family members, date w/ mod cues   4. Pt will generate 4 items per category w/ mod cues to enhance organizations skills   5. Pt will label items w/ 60%acc w/ mod cues to enhance verbal expression skills   6. Pt will participate in ongoing assessment of speech language and cognitive linguistic skills to help rule out deficits and determine therapeutic plan of care   7. Pt will complete three step commands with 80% acc given min A.                          Plan:     · Patient to be seen:  4 x/week   · Plan of Care expires:     · Plan of Care reviewed with:  patient, daughter   · SLP Follow-Up:  Yes       Discharge recommendations:  rehabilitation facility     Time Tracking:     SLP Treatment Date:   05/16/19  Speech Start Time:  1134  Speech Stop Time:  1157     Speech Total Time (min):  23 min    Billable Minutes: Speech Therapy Individual 23    Josselyn Medeiros CCC-SLP  05/16/2019 5/16/2019

## 2019-05-16 NOTE — PLAN OF CARE
Problem: Physical Therapy Goal  Goal: Physical Therapy Goal  Goals to be met by: 19    Patient will increase functional independence with mobility by performin. Supine to sit with Set-up [place] - met   2. Sit to supine with Set-up [place]  3. Sit to stand transfer with Supervision - met   4. Gait  x 200 feet with Stand By Assistance using no assistive device  5. Lower extremity exercise program x30 reps per handout, with independence to improve muscular strength and endurance.          Outcome: Ongoing (interventions implemented as appropriate)  Goals reviewed and remain appropriate. Pt progressing towards goals.    Adriane Gooden, PT, DPT   2019  [phone]

## 2019-05-16 NOTE — PT/OT/SLP PROGRESS
"Occupational Therapy   Treatment    Name: Holly Patel  MRN: [id number]  Admitting Diagnosis:  Nontraumatic subcortical hemorrhage of left cerebral hemisphere  24 Days Post-Op    Recommendations:     Discharge Recommendations: rehabilitation facility  Discharge Equipment Recommendations:  none  Barriers to discharge:  Other (Comment)(safety risk; level of skilled assistance required; remains in ICU)    Assessment:     Holly Patel is a 28 y.o. female with a medical diagnosis of Nontraumatic subcortical hemorrhage of left cerebral hemisphere.  She presents with greatest concerns for home return related to her cognition, aphasia and endurance level. She demo gains towards OT goals at this time and demo improvement with overall cognitive task with mobility. She would greatly benefit from rolling walker in room and mobility with nursing staff while in acute setting. Performance deficits affecting function are impaired endurance, impaired self care skills, impaired functional mobilty, gait instability, impaired balance, impaired cognition, impaired cardiopulmonary response to activity, other (comment)(aphasia).     Rehab Prognosis:  Good; patient would benefit from acute skilled OT services to address these deficits and reach maximum level of function.       Plan:     Patient to be seen 3 x/week to address the above listed problems via self-care/home management, therapeutic activities, therapeutic exercises, neuromuscular re-education, cognitive retraining  · Plan of Care Expires: 05/25/19  · Plan of Care Reviewed with: patient, family    Subjective   Upon arrival "I've been waiting for you guys"  Pain/Comfort:  · Pain Rating 1: 0/10  · Pain Rating Post-Intervention 1: 0/10    Objective:     Communicated with: RN prior to session.  Patient found HOB elevated with telemetry upon OT entry to room.    General Precautions: Standard, aphasia, aspiration, fall, seizure   Orthopedic Precautions:N/A   Braces: " N/A     Occupational Performance:   Functional Mobility/Transfers:  · Patient completed Sit <> Stand Transfer with contact guard assistance  with  rolling walker   · Patient completed Bed <> Chair Transfer using Step Transfer technique with contact guard assistance with rolling walker  · Functional Mobility: household and community distances with CGA and rolling walker  · Required 1 seated rest break 2/2 back pain  · Cont to require AD    Activities of Daily Living:  · Upper Body Dressing: minimum assistance donning gown as jacket in standing; completed mobility to closet to gather item  · Lower Body Dressing: minimum assistance to don brief    AMPAC 6 Click ADL: 19    Treatment & Education:  -Pt alert and oriented x3; agreeable to therapy session and eager for activity   -Pt able to correctly read time on analog clock in hallway x2 trials  -requiring max cues for safety prompts/concerns   -min cues for R attention with mobility/environent related tasks   -completed various SLP related task with seated rest break  -Communication board updated; questions/concerns addressed within OT scope of practice      Patient left up in chair with all lines intact, call button in reach and family presentEducation:      GOALS:   Multidisciplinary Problems     Occupational Therapy Goals        Problem: Occupational Therapy Goal    Goal Priority Disciplines Outcome Interventions   Occupational Therapy Goal     OT, PT/OT Ongoing (interventions implemented as appropriate)    Description:  Goals to be met by: 5/23    Patient will increase functional independence with ADLs by performing:    Pt will follow 95% of simple step commands for functional tasks.   Pt will verbally ID 3/3 items for ADL task with added time.   UE Dressing with Set-up Assistance and Supervision.  LE Dressing (pants, brief) with Set-up Assistance and CGA.   Grooming while standing with Set-up Assistance and Contact Guard Assistance.  Functional mobility at short  household distance for ADL task CGA and no AD.                           Time Tracking:     OT Date of Treatment: 05/16/19  OT Start Time: 1134  OT Stop Time: 1201  OT Total Time (min): 27 min    Billable Minutes:Self Care/Home Management 10  Therapeutic Activity 13    RENA Horta  5/16/2019

## 2019-05-16 NOTE — ASSESSMENT & PLAN NOTE
BP Readings from Last 3 Encounters:   05/16/19 (!) 173/111   04/09/19 (!) 173/109   04/05/19 (!) 155/94     BPs elevated and uncontrolled. Maxed on all meds but imdur  Continue verapamil, irbesartan, clonidine, hydralazine, and carvedilol  imdur increased to 90 mg   Cont prn labetalol and hydralazine.

## 2019-05-16 NOTE — PROGRESS NOTES
Progress Note    Consults  SUBJECTIVE:     Patient seen and examined. No acute events reported overnight. Patient states that she slept well. She denies chest pain, shortness of breath, nausea, vomiting. She denies any new bruising. She denies any bleeding events.     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 ()    Moderate protein-calorie malnutrition 2017    MRSA bacteremia 2017    Pneumonia     Prophylactic immunotherapy 2014    Renovascular hypertension 10/2/2015    Secondary hyperparathyroidism 2017    Seizures     Sialadenitis 3/21/2018    Thrombocytopenia 2016     Past Surgical History:   Procedure Laterality Date    BIOPSY, LIVER, TRANSJUGULAR APPROACH N/A 2018    Performed by [facility] at [facility] OR 2ND FLR    BIOPSY-LIVER N/A 2017    [facility]formed by [facility] OR 2ND FLR    BIOPSY-LIVER N/A 3/22/2016    Performed by [facility] at [facility] OR 2ND FLR     SECTION      x 2    CONIZATION-CERVICAL-LEEP N/A 6/15/2018    Performed by Neelam Marroquin [facility] at [facility] OR     [id number]; upper extremity Left 7/17/2015    Performed by Idalia Diaz MD at [facility] OR 2ND FLR    CRANIOPLASTY  4/22/2019    Performed by Jose Luis Powell MD at [facility] OR 2ND FLR    CRANIOTOMY-- left crani for clot evac with microscrope Left 4/22/2019    Performed by Jose Luis Powell MD at [facility] OR 2ND FLR    EMBOLIZATION, BLOOD VESSEL N/A 7/21/2018    Performed by Aren Ramos MD at [facility] CATH LAB    Exam Under Anesthesia N/A 8/8/2018    Performed by [facility]am [facility]roquin MD at [facility] OR 2ND FLR    Exam under anesthesia N/A 6/19/2018    Performed by Neelam Marroquin MD at [facility] OR    Exam under anesthesia (ADD ON ) N/A 7/9/2018    Performed by NICKIE Alvarez MD at [facility] OR    Exam under anesthesia -cervical suturing  N/A 7/26/2018    Performed by Lei Sims III, MD at [facility] OR    EXCISION, NEOPLASM, SKULL with Cranioplasty Left 4/22/2019    Performed by Jose Luis Powell MD at [facility] OR 2ND FLR    FISTULOGRAM Left 12/4/2015    Performed by Idalia Diaz MD at [facility] CATH LAB    LIVER BIOPSY      LIVER TRANSPLANT  09/1992    PARATHYROIDECTOMY, Minimally Invasive Bilateral Exploration Bilateral 3/27/2019    Performed by Ashley Guallpa MD at [facility] OR 2ND FLR    SUTURE REPAIR,CERVIX  6/19/2018    Performed by Neelam Marroquin MD at [facility] OR    TUBAL LIGATION  2010     Family History   Problem Relation Age of Onset    Hypertension Mother     Hypertension Father     Cancer Sister     Heart attack Maternal Uncle     Melanoma Neg Hx     Breast cancer Neg Hx     Colon cancer Neg Hx     Ovarian cancer Neg Hx      Social History     Tobacco Use    Smoking status: Never Smoker    Smokeless tobacco: Never Used   Substance Use Topics    Alcohol use: No    Drug use: No     ROS  OBJECTIVE:     Vital Signs:  Temp:  [97.8 °F (36.6 °C)-98.4 °F (36.9 °C)]   Pulse:  [73-89]   Resp:  [16-18]   BP: (168-216)/(108-133)   SpO2:  [94 %-96 %]     Physical Exam   Constitutional: She is  oriented to person, place, and time. She appears well-developed and well-nourished. No distress.   Cardiovascular: Normal rate and regular rhythm.   Pulmonary/Chest: Effort normal and breath sounds normal. No stridor. No respiratory distress. She has no wheezes. She has no rales. She exhibits no tenderness.   Abdominal: Soft. Bowel sounds are normal. There is splenomegaly. There is no tenderness.   Neurological: She is alert and oriented to person, place, and time.   Psychiatric: She has a normal mood and affect.     Laboratory:  CBC:   Recent Labs   Lab 05/16/19 0458   WBC 3.48*   RBC 2.94*   HGB 7.9*   HCT 24.0*   PLT 52*   MCV 82   MCH 26.9*   MCHC 32.9     BMP:   Recent Labs   Lab 05/16/19 0458   *      K 4.9      CO2 25   BUN 23*   CREATININE 4.3*   CALCIUM 8.5*     CMP:   Recent Labs   Lab 05/16/19 0458   *   CALCIUM 8.5*   ALBUMIN 2.8*   PROT 6.9      K 4.9   CO2 25      BUN 23*   CREATININE 4.3*   ALKPHOS 563*   ALT 27   AST 33   BILITOT 0.5     Coagulation:   Recent Labs   Lab 05/16/19 0458   LABPROT 11.6   INR 1.1       ASSESSMENT/PLAN:     Acute on chronic Thrombocytopenia  - plts today improved 52 K today; s/p 1 U of plts on 5/14  - etiology likely hypersplenism   - noted to have a spleen previously of 19.7 cm on US Abdomen on 10/23/18  - Counts seem to be responding to dexamethasone 40mg. On day 3/4. IR consulted for possible splenic artery embolization later this week if patient fails to have response to steroids.   -  chronically, the plts have been noted to be low since 01/2015, with plt counts  Ranging from 40 K to 122 K.  - On day of her surgery, the pt was noted to have plt count of 56 K and pt has been flucutating in between before trending downwards  -  pt had 14plt units, 1 cryo, 1 prBC during this admission; the plt transfusion involved 2 units of plts on 5/8/19. Lack of complete plt response previously to transfusions is likely secondary to  alloimunization.  - peripheral smear does not reveal schisctocytes, giant plts, or plt clumping  - H2 blockers stopped  - HIT antibody negative     To be discussed with Dr. Hammonds.        ATTENDING NOTE, HEMATOLOGY CONSULT TEAM    As above; events of last 24 hours noted.  Today is Day 3 of dexamethasone.  Patient seen and examined, chart reviewed.  Appears comfortable, in NAD.  Lungs are clear to auscultation.  Abdomen is soft, nontender.    Labs reviewed.  Her platelet count today is 52,000 /mm3.    RECOMMEND    Follow CBC daily.  Complete the four day course of dexamethasone.  No need for further intervention so long as her platelet count remains above 40,000 /mm3.    We will reevaluate in am.  Her multiple questions were answered to her satisfaction.    Jaison Henson MD

## 2019-05-16 NOTE — PLAN OF CARE
Notified by RN of SBP in 220s.  Patient has received Hydralazine PO 100mg, Clonidine 0.3mg, Labetolol 20mg IV, Coreg 25mg PO, Hydralazine 10mg IV, and Nifedipine 30mg PO with minimal improvement.  Curbside with NCC.  Patient's SBP noted to be spiking to 200s over night for multiple nights.  No neuro changes at this time, neuro checks q4hrs.  Will try Nitropaste.  NCC recommends considering cardiology consult for assistance with antihypertensive regimen.    Amy Garcia, [facility], PA-C  [facility] Medicine Department  Staff:  Dr. Burton

## 2019-05-16 NOTE — ASSESSMENT & PLAN NOTE
Lab Results   Component Value Date    CALCIUM 8.5 (L) 05/16/2019    PHOS 1.8 (L) 05/16/2019     HD as scheduled  Ca to adjusted in dialysate bath   Hold calcitriol  Hold Ca carbonate

## 2019-05-16 NOTE — PLAN OF CARE
Problem: SLP Goal  Goal: SLP Goal  Goals to be met by 5/16:  1. Pt will tolerate diet of thin liquids and dental soft solids without overt clinical signs of aspiration   2. Pt will participate in trials of regular solids within speech therapy sessions to help determine least restrictive diet   3. Pt will demonstrate orientation to place, situation , family members, date w/ mod cues   4. Pt will generate 4 items per category w/ mod cues to enhance organizations skills   5. Pt will label items w/ 60%acc w/ mod cues to enhance verbal expression skills   6. Pt will participate in ongoing assessment of speech language and cognitive linguistic skills to help rule out deficits and determine therapeutic plan of care   7. Pt will complete three step commands with 80% acc given min A.        Current plan of care remains appropriate       Josselyn Medeiros MS, CCC-SLP  Speech Language Pathologist  Pager: [phone]  Date 5/16/2019

## 2019-05-16 NOTE — PLAN OF CARE
Problem: Adult Inpatient Plan of Care  Goal: Plan of Care Review  Outcome: Ongoing (interventions implemented as appropriate)  Patient is AAO x4. POC reviewed with patient. Patient verbalized understanding. Questions answered and encouraged. Patient's breathing is unlabored with equal chest expansion. Patient has generalized weakness. Patient's left arm AV Fistula has a thrill and bruit. Patient receives hemodialysis MWF. Patient has a distended,rounded abdomen. Patient's BP has been very labile during the shift. SBP has been ranging from 180-220 since 23:00 (5/15/19). The Dr was notified during shift. New orders were put in for patient. Patient is asymptomatic regarding her HTN.  Patient verbalizes her concern about Dr wanting to perform surgery. Patient also vocalizes that she is ready to go home. Bed in lowest position, call bell with in reach, side rails up x2, no complaints or signs of distress. WCTM.

## 2019-05-16 NOTE — PROGRESS NOTES
[facility] Hospital Medicine  Progress Note    Patient Name: Holly Patel  MRN: [id number]  Patient Class: IP- Inpatient   Admission Date: 4/22/2019  Length of Stay: 24 days  Attending Physician: Zeenat Almanza MD  Primary Care Provider: Stan Sosa MD    Hospital Medicine Team: [facility] HOSP MED G Raymond Morel MD    Subjective:     Principal Problem:Nontraumatic subcortical hemorrhage of left cerebral hemisphere    HPI:  29 yo F w/ PMH significant for liver transplant in 1991, recent thyroidectomy on 3/27/2019, ESRD on HD (MWF), and epilepsy who presents to [facility] for higher level of care following planned surgical excision of L calvarial lesion s/p excision and cranioplasty 4/22/19. The pt presented to [facility] on 03/12/2019 with a complaint of headaches, among other symptoms, and received a workup that included a CT head which showed a left temporal calvarium lesion with mass effect. At that time she shared that she continued to have a headache, noting the pain was usually over the left temporal aspect of her head but migrated over to the right temporal aspect. She states the pain on the right is exacerbated with palpation of the area. She has no additional symptomatic complaints at this time. Currently stable following surgery. Denies acute pain, otherwise comfortable w/ non-focal neurological exam.      Hospital Course:  No notes on file    Interval History: No acute events. No new complaints. Platelets responding to current therapy. No evidence of bleeding. Blood pressure remains uncontrolled. Consulting cardiology for assistance.     Review of Systems   Constitutional: Negative for chills and fever.   HENT: Negative.    Eyes: Negative for visual disturbance.   Respiratory: Negative for cough, chest tightness and shortness of breath.    Cardiovascular: Negative for chest pain.   Gastrointestinal: Positive for abdominal distention. Negative for nausea and vomiting.   Musculoskeletal:  Negative.    Neurological: Negative for light-headedness, numbness and headaches.     Objective:     Vital Signs (Most Recent):  Temp: 97.8 °F (36.6 °C) (05/16/19 0811)  Pulse: 85 (05/16/19 0811)  Resp: 18 (05/16/19 0811)  BP: (!) 173/111 (05/16/19 0811)  SpO2: 96 % (05/16/19 0811) Vital Signs (24h Range):  Temp:  [97.8 °F (36.6 °C)-98.7 °F (37.1 °C)] 97.8 °F (36.6 °C)  Pulse:  [65-89] 85  Resp:  [16-19] 18  SpO2:  [94 %-96 %] 96 %  BP: (124-216)/() 173/111     Weight: 54.8 kg (120 lb 13 oz)  Body mass index is 22.1 kg/m².    Intake/Output Summary (Last 24 hours) at 5/16/2019 0932  Last data filed at 5/15/2019 1215  Gross per 24 hour   Intake 0 ml   Output 1864 ml   Net -1864 ml      Physical Exam   Constitutional: She is oriented to person, place, and time. She appears well-developed and well-nourished. No distress.   Lying comfortably. On HD.    HENT:   Head: Normocephalic.   Surgical site healing well   Neck: Neck supple.   Cardiovascular: Normal rate and regular rhythm.   Pulmonary/Chest: Effort normal and breath sounds normal.   Abdominal: There is no tenderness.   Distended    Neurological: She is alert and oriented to person, place, and time.   Skin: Skin is warm. No erythema.   Vitals reviewed.      Significant Labs: All pertinent labs within the past 24 hours have been reviewed.    Significant Imaging: I have reviewed all pertinent imaging results/findings within the past 24 hours.    Assessment/Plan:      * Nontraumatic subcortical hemorrhage of left cerebral hemisphere  S/P clot evacuation and resection of L temporal lesion with cranioplasty on 4/22  Neurosurgery following  Patient neurologically stable on exam  Staples removed without complication. Patient tolerated removal well. OK to get incision wet.  Continue Keppra and Vimpat for seizure prevention  Maintain SBP < 160  Platelet goal of > 40 K per Dr. Powell  Please continue to hold any anticoagulation or DVT prophylaxis   Follow up with Dr. Powell  "in 2 weeks with head CT. NSGY will schedule this appt.    Per NS, an additional dose of keppra 500 mg to be given after HD on HD days  PTOT    Thrombocytopenia  Lab Results   Component Value Date    PLT 52 (L) 05/16/2019     Platelets improving and holding steady  No signs of bleeding.   likely secondary to splenic sequestration  HIT negative.   Hematology consulted:  1. if thrombocytopenia worsens, management could include splenic artery embolization vs. Radiation vs. Splenectomy  2. lack of plt response to transfusions is likely secondary to alloimunization  3. can transfuse for bleeding with plts less than 50 K or for lower treshold (such as 20-30K) even without bleeding    4. discussed her case with Dr. Powell; her platelet count is adequate and does not need to be raised.    Heme onc update given drop in platelets as of 5/13:  If platelets continue to drop, would first discuss with the Neurosurgical Service to determine at what level they feel that an intervention is needed to raise her platelet count.  If they feel that her platelet needs to be raised, would consider steroids initially (dexamethasone 40 mg QD x 4 days) and also interventions to reduce her splenic sequestration sich as splenic embolization or splenic XRT. Splenectomy is probably not a very good option at this point.    5/14: Case discussed with NS, heme onc and IR. Platelet goal of >40K. Will continue with dexamethasone x 4 days with platelet transfusion to be given today, 5/14. Should this fail, then will plan for IR embolization most likely 5/20 if patient in agreement.     5/15: Platelets responded to 47K after transfusion. Patient stated to me that she "has to do what needs to be done" if embolization is needed. Will continue to monitor platelet response on dex until last dose scheduled to end on 5/17.    5/16: Platelets improved with above management. Cont to monitor.           Renovascular hypertension  BP Readings from Last 3 Encounters: "   05/16/19 (!) 173/111   04/09/19 (!) 173/109   04/05/19 (!) 155/94     BPs elevated and uncontrolled. Maxed on all meds but imdur  Continue verapamil, irbesartan, clonidine, hydralazine, and carvedilol  imdur increased to 90 mg   Cont prn labetalol and hydralazine.     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

## 2019-05-16 NOTE — PLAN OF CARE
Problem: Occupational Therapy Goal  Goal: Occupational Therapy Goal  Goals to be met by: 5/23    Patient will increase functional independence with ADLs by performing:    Pt will follow 95% of simple step commands for functional tasks.   Pt will verbally ID 3/3 items for ADL task with added time.   UE Dressing with Set-up Assistance and Supervision.  LE Dressing (pants, brief) with Set-up Assistance and CGA.   Grooming while standing with Set-up Assistance and Contact Guard Assistance.  Functional mobility at short household distance for ADL task CGA and no AD.         Outcome: Ongoing (interventions implemented as appropriate)  Goals revised and extended per POC. RENA Horta 5/16/2019

## 2019-05-16 NOTE — PT/OT/SLP PROGRESS
Physical Therapy Treatment    Patient Name:  Holly Patel   MRN:  [id number]    Recommendations:     Discharge Recommendations:  rehabilitation facility   Discharge Equipment Recommendations: walker, rolling   Barriers to discharge: pt below functional baseline    Assessment:     Holly Patel is a 28 y.o. female admitted with a medical diagnosis of Nontraumatic subcortical hemorrhage of left cerebral hemisphere.  She presents with the following impairments/functional limitations:  weakness, gait instability, impaired endurance, impaired balance, impaired self care skills, impaired functional mobilty, impaired cardiopulmonary response to activity, decreased safety awareness, impaired cognition. Improved attention to task this date with pt able to converse with PT throughout gait training. She also demo'd improvement in gait distance, gait stability, and fluidity of gait. Pt continues to demo expressive aphasia with difficulty identifying objects correctly. Also continues to demo gait instability and requires RW throughout ambulation for balance assist. Pt would continue to benefit from skilled acute PT in order to address current deficits and progress functional mobility.     Rehab Prognosis: Good; patient would benefit from acute skilled PT services to address these deficits and reach maximum level of function.    Recent Surgery: Procedure(s) (LRB):  CRANIOTOMY-- left crani for clot evac with microscrope (Left) 24 Days Post-Op    Plan:     During this hospitalization, patient to be seen 3 x/week to address the identified rehab impairments via gait training, therapeutic activities, therapeutic exercises, neuromuscular re-education and progress toward the following goals:    · Plan of Care Expires:  05/28/19    Subjective     Chief Complaint: back pain   Patient/Family Comments/goals: Pt reports that she would like to go outside in a w/c. RN and pt family searching for w/c during session.    Pain/Comfort:  · Pain Rating 1: (reported back pain; did not rate)      Objective:     Communicated with RN prior to session.  Patient found up in chair with telemetry upon PT entry to room.     General Precautions: Standard, fall, seizure, aphasia, aspiration   Orthopedic Precautions:N/A   Braces: N/A     Functional Mobility:  · Transfers:     · Sit to Stand:  supervision with rolling walker  · Gait: 88 ft. x2 with RW and CGA  ? demo'd decreased mckay, decreased step length, impaired weight-shifting ability, and mild gait instability   ? Cues provided for self-pacing and safety awareness  ? PT providing cues throughout for pt to identify various objects in hallway  ? Distance limited 2* back pain     AM-PAC 6 CLICK MOBILITY  Turning over in bed (including adjusting bedclothes, sheets and blankets)?: 4  Sitting down on and standing up from a chair with arms (e.g., wheelchair, bedside commode, etc.): 4  Moving from lying on back to sitting on the side of the bed?: 4  Moving to and from a bed to a chair (including a wheelchair)?: 4  Need to walk in hospital room?: 3  Climbing 3-5 steps with a railing?: 3  Basic Mobility Total Score: 22       Therapeutic Activities and Exercises:   Pt educated on importance of OOB activity and progression of mobility. Pt v/u but required encouragement for participation in therapy 2* back pain. Pt agreeable with encouragement.   Completed gait training as described above with PT providing cues for pt to identify various objects in hallway. Pt demo'ing expressive aphasia with difficulty stating correct names of objects. Required max cues for pt to correctly state object names. Able to continue conversation throughout gait.   Pt instructed on seated posterior pelvic tilt exercises 2* back pain. Pt reporting that she will perform later with her mother.     Patient left up in chair with all lines intact, call button in reach and RN notified..    GOALS:   Multidisciplinary Problems      Physical Therapy Goals        Problem: Physical Therapy Goal    Goal Priority Disciplines Outcome Goal Variances Interventions   Physical Therapy Goal     PT, PT/OT Ongoing (interventions implemented as appropriate)     Description:  Goals to be met by: 19    Patient will increase functional independence with mobility by performin. Supine to sit with Set-up [place] - met   2. Sit to supine with Set-up [place]  3. Sit to stand transfer with Supervision - met   4. Gait  x 200 feet with Stand By Assistance using no assistive device  5. Lower extremity exercise program x30 reps per handout, with independence to improve muscular strength and endurance.                            Time Tracking:     PT Received On: 19  PT Start Time: 1429     PT Stop Time: 1441  PT Total Time (min): 12 min     Billable Minutes: Therapeutic Activity 12    Treatment Type: Treatment  PT/PTA: PT     PTA Visit Number: 0     Adriane Gooden PT, DPT   2019

## 2019-05-16 NOTE — ASSESSMENT & PLAN NOTE
"Lab Results   Component Value Date    PLT 52 (L) 05/16/2019     Platelets improving and holding steady  No signs of bleeding.   likely secondary to splenic sequestration  HIT negative.   Hematology consulted:  1. if thrombocytopenia worsens, management could include splenic artery embolization vs. Radiation vs. Splenectomy  2. lack of plt response to transfusions is likely secondary to alloimunization  3. can transfuse for bleeding with plts less than 50 K or for lower treshold (such as 20-30K) even without bleeding    4. discussed her case with Dr. Powell; her platelet count is adequate and does not need to be raised.    Heme onc update given drop in platelets as of 5/13:  If platelets continue to drop, would first discuss with the Neurosurgical Service to determine at what level they feel that an intervention is needed to raise her platelet count.  If they feel that her platelet needs to be raised, would consider steroids initially (dexamethasone 40 mg QD x 4 days) and also interventions to reduce her splenic sequestration sich as splenic embolization or splenic XRT. Splenectomy is probably not a very good option at this point.    5/14: Case discussed with NS, tuan baxter and IR. Platelet goal of >40K. Will continue with dexamethasone x 4 days with platelet transfusion to be given today, 5/14. Should this fail, then will plan for IR embolization most likely 5/20 if patient in agreement.     5/15: Platelets responded to 47K after transfusion. Patient stated to me that she "has to do what needs to be done" if embolization is needed. Will continue to monitor platelet response on dex until last dose scheduled to end on 5/17.    5/16: Platelets improved with above management. Cont to monitor.         "

## 2019-05-17 NOTE — PLAN OF CARE
Problem: Cerebral Tissue Perfusion Risk (Stroke, Hemorrhagic)  Goal: Optimal Cerebral Tissue Perfusion    Intervention: Protect and Optimize Cerebral Perfusion  Plan of care reviewed with pt. Pt educated on fall risks, prevention and self care encouraged. Safety precautions maintained. Bed in lowest position, call light within reach, side rails up x3. No complaints of pain or discomfort at this time. Pt has been having elevated B/Ps overnight. Pt Medicated per MD orders. B/P still elevated per reassessment. Hourly rounding complete. Pt asymptomatic. Pt is resting with eyes closed, aroused with verbal stimuli,. VSS, respirations even and unlabored at this time. Pt voices no needs at this time. Will continue to monitor.

## 2019-05-17 NOTE — PT/OT/SLP PROGRESS
Speech Language Pathology  Patient not seen      Holly Patel  MRN: [id number]    Patient not seen today secondary to IVANA for dialysis. Will follow-up at next scheduled service date.    Emily Abadie, FRANCOIS-SLP

## 2019-05-17 NOTE — PT/OT/SLP PROGRESS
"Physical Therapy Treatment    Patient Name:  Holly Patel   MRN:  [id number]    Recommendations:     Discharge Recommendations:  rehabilitation facility   Discharge Equipment Recommendations: (TBD)   Barriers to discharge: pt below functional baseline    Assessment:     Holly Patel is a 28 y.o. female admitted with a medical diagnosis of Nontraumatic subcortical hemorrhage of left cerebral hemisphere.  She presents with the following impairments/functional limitations:  weakness, impaired functional mobilty, impaired endurance, gait instability, impaired cognition, decreased coordination, decreased safety awareness, pain, impaired balance, impaired self care skills, impaired cardiopulmonary response to activity. Pt tolerated gait training without AD and stair training this date, but with increased instability noted without BUE support. Continues to demo impaired safety awareness and cognitive deficits, limiting ability to safely complete functional tasks (I). Pt would continue to benefit from skilled acute PT in order to address current deficits and progress functional mobility. Due to cognitive deficits, pt is unsafe to return home at current level of function; recommend IP rehab upon in order to maximize return to PLOF.     Rehab Prognosis: Good; patient would benefit from acute skilled PT services to address these deficits and reach maximum level of function.    Recent Surgery: Procedure(s) (LRB):  CRANIOTOMY-- left crani for clot evac with microscrope (Left) 25 Days Post-Op    Plan:     During this hospitalization, patient to be seen 2 x/week to address the identified rehab impairments via gait training, therapeutic activities, therapeutic exercises, neuromuscular re-education and progress toward the following goals:    · Plan of Care Expires:  05/28/19    Subjective     Chief Complaint: back pain   Patient/Family Comments/goals: "That's a chair." re: pt incorrectly identifying elevator, bed, " walker   Pain/Comfort:  · Pain Rating 1: (reported back pain following gait; did not rate)  · Pain Addressed 1: Reposition, Distraction(heating pad; instructed on therex)      Objective:     Communicated with RN prior to session.  Patient found supine with telemetry upon PT entry to room.     General Precautions: Standard, fall, aphasia, aspiration, seizure   Orthopedic Precautions:N/A   Braces: N/A     Functional Mobility:  · Bed Mobility:     · Supine to Sit: modified independence with HOB elevated  · Sit to Supine: modified independence with HOB elevated  · Transfers:     · Sit to Stand:  supervision with no AD  · Gait: ~60 ft. x2 without AD with CGA  · Standing rest break at approx skilled nursing point  · demo'd impaired safety awareness, impaired weight-shifting ability, mild gait instability, and inconsistent foot placement  · Cues provided for safety awareness and attention to environmental surroundings   · Stairs:  Pt ascended/descended 5 stair(s) with No Assistive Device with left handrail with Minimal Assistance.    · Performed using step-to pattern 2* LE weakness and balance impairments  · Required t/c at quad for facilitation of knee ext when ascending and for controlled lowering when descending       AM-PAC 6 CLICK MOBILITY  Turning over in bed (including adjusting bedclothes, sheets and blankets)?: 4  Sitting down on and standing up from a chair with arms (e.g., wheelchair, bedside commode, etc.): 4  Moving from lying on back to sitting on the side of the bed?: 4  Moving to and from a bed to a chair (including a wheelchair)?: 4  Need to walk in hospital room?: 3  Climbing 3-5 steps with a railing?: 3  Basic Mobility Total Score: 22       Therapeutic Activities and Exercises:   Pt naming objects in hallway during ambulation. Perseverating on last stated word. Unable to correctly perform without cues.   RW ordered for use during hospital admission.   Pt and family encouraged to continue ambulation with RW and  assist and continued performance of cog exercises. Pt and family v/u.   Pt and mother instructed on hooklying posterior pelvic tilts for abdominal strengthening and decreased back pain.     Patient left supine with HOB elevated with all lines intact, call button in reach and pt's family present..    GOALS:   Multidisciplinary Problems     Physical Therapy Goals        Problem: Physical Therapy Goal    Goal Priority Disciplines Outcome Goal Variances Interventions   Physical Therapy Goal     PT, PT/OT Ongoing (interventions implemented as appropriate)     Description:  Goals to be met by: 19    Patient will increase functional independence with mobility by performin. Supine to sit with Set-up [place] - met   2. Sit to supine with Set-up [place] - met   3. Sit to stand transfer with Supervision - met   4. Gait  x 200 feet with Stand By Assistance using no assistive device  5. Lower extremity exercise program x30 reps per handout, with independence to improve muscular strength and endurance.                             Time Tracking:     PT Received On: 19  PT Start Time: 1514     PT Stop Time: 1530  PT Total Time (min): 16 min     Billable Minutes: Therapeutic Activity 16    Treatment Type: Treatment  PT/PTA: PT     PTA Visit Number: 0     Adriane Gooden, PT, DPT   2019  [phone]

## 2019-05-17 NOTE — PROGRESS NOTES
Pt arrived to [facility] via stretcher. HD T,TH,S maintenance pt. Pt alert & stable. Thrill/bruit noted, accessed LT AVBF X2 15G BH needles without difficulty. Duration 4.0hrs, Qb 400ml/min, Qd 800ml/min, planned UFG 3.0L, orders reviewed.

## 2019-05-17 NOTE — PLAN OF CARE
05/17/19 1555   Discharge Reassessment   Assessment Type Discharge Planning Reassessment   Provided patient/caregiver education on the expected discharge date and the discharge plan Yes   Do you have any problems affording any of your prescribed medications? No   Discharge Plan A Rehab  ([facility])   Post-Acute Status   Discharge Delays (!) Other  (Still monitoring patient's platelets.)

## 2019-05-17 NOTE — PLAN OF CARE
Problem: Physical Therapy Goal  Goal: Physical Therapy Goal  Goals to be met by: 19    Patient will increase functional independence with mobility by performin. Supine to sit with Set-up [place] - met   2. Sit to supine with Set-up [place] - met   3. Sit to stand transfer with Supervision - met   4. Gait  x 200 feet with Stand By Assistance using no assistive device  5. Lower extremity exercise program x30 reps per handout, with independence to improve muscular strength and endurance.           Outcome: Ongoing (interventions implemented as appropriate)  Goals reviewed and remain appropriate. Pt progressing towards goals.    Adriane Gooden, PT, DPT   2019  [phone]

## 2019-05-17 NOTE — PROGRESS NOTES
IHD completed, blood returned. Pt alert 7 stable. Thrill/bruit noted, hemostasis 5 minutes, dry occlusive dressing applied to sites. Duration 4.0hrs, Qb 400ml/min, Qd 800ml/min, UFG 3.0L. Report called. Pt transported to [facility] via stretcher with tele.

## 2019-05-17 NOTE — SUBJECTIVE & OBJECTIVE
Interval History: No acute events. No new complaints. Platelets improving and responding to current therapy. No evidence of bleeding. Blood pressure remains slightly better. Going to HD today.    Review of Systems   Constitutional: Negative for chills and fever.   HENT: Negative.    Eyes: Negative for visual disturbance.   Respiratory: Negative for cough, chest tightness and shortness of breath.    Cardiovascular: Negative for chest pain.   Gastrointestinal: Positive for abdominal distention. Negative for nausea and vomiting.   Musculoskeletal: Negative.    Neurological: Negative for light-headedness, numbness and headaches.     Objective:     Vital Signs (Most Recent):  Temp: 97.8 °F (36.6 °C) (05/17/19 0744)  Pulse: 83 (05/17/19 0744)  Resp: (!) 22 (05/17/19 0915)  BP: (!) 149/92 (05/17/19 0915)  SpO2: 98 % (05/17/19 0744) Vital Signs (24h Range):  Temp:  [97.5 °F (36.4 °C)-98.2 °F (36.8 °C)] 97.8 °F (36.6 °C)  Pulse:  [73-87] 83  Resp:  [17-22] 22  SpO2:  [95 %-98 %] 98 %  BP: (134-194)/() 149/92     Weight: 54.8 kg (120 lb 13 oz)  Body mass index is 22.1 kg/m².  No intake or output data in the 24 hours ending 05/17/19 0922   Physical Exam   Constitutional: She is oriented to person, place, and time. She appears well-developed and well-nourished. No distress.   Lying comfortably. On HD.    HENT:   Head: Normocephalic.   Surgical site healing well   Neck: Neck supple.   Cardiovascular: Normal rate and regular rhythm.   Pulmonary/Chest: Effort normal and breath sounds normal.   Abdominal: There is no tenderness.   Distended    Neurological: She is alert and oriented to person, place, and time.   Skin: Skin is warm. No erythema.   Vitals reviewed.      Significant Labs: All pertinent labs within the past 24 hours have been reviewed.    Significant Imaging: I have reviewed all pertinent imaging results/findings within the past 24 hours.

## 2019-05-17 NOTE — PROGRESS NOTES
[facility] Hospital Medicine  Progress Note    Patient Name: Holly Patel  MRN: [id number]  Patient Class: IP- Inpatient   Admission Date: 4/22/2019  Length of Stay: 25 days  Attending Physician: Zeenat Almanza MD  Primary Care Provider: Stan Sosa MD    Hospital Medicine Team: [facility] HOSP MED G Raymond Morel MD    Subjective:     Principal Problem:Nontraumatic subcortical hemorrhage of left cerebral hemisphere    HPI:  27 yo F w/ PMH significant for liver transplant in 1991, recent thyroidectomy on 3/27/2019, ESRD on HD (MWF), and epilepsy who presents to [facility] for higher level of care following planned surgical excision of L calvarial lesion s/p excision and cranioplasty 4/22/19. The pt presented to [facility] on 03/12/2019 with a complaint of headaches, among other symptoms, and received a workup that included a CT head which showed a left temporal calvarium lesion with mass effect. At that time she shared that she continued to have a headache, noting the pain was usually over the left temporal aspect of her head but migrated over to the right temporal aspect. She states the pain on the right is exacerbated with palpation of the area. She has no additional symptomatic complaints at this time. Currently stable following surgery. Denies acute pain, otherwise comfortable w/ non-focal neurological exam.      Hospital Course:  No notes on file    Interval History: No acute events. No new complaints. Platelets improving and responding to current therapy. No evidence of bleeding. Blood pressure remains slightly better. Going to HD today.    Review of Systems   Constitutional: Negative for chills and fever.   HENT: Negative.    Eyes: Negative for visual disturbance.   Respiratory: Negative for cough, chest tightness and shortness of breath.    Cardiovascular: Negative for chest pain.   Gastrointestinal: Positive for abdominal distention. Negative for nausea and vomiting.   Musculoskeletal:  Negative.    Neurological: Negative for light-headedness, numbness and headaches.     Objective:     Vital Signs (Most Recent):  Temp: 97.8 °F (36.6 °C) (05/17/19 0744)  Pulse: 83 (05/17/19 0744)  Resp: (!) 22 (05/17/19 0915)  BP: (!) 149/92 (05/17/19 0915)  SpO2: 98 % (05/17/19 0744) Vital Signs (24h Range):  Temp:  [97.5 °F (36.4 °C)-98.2 °F (36.8 °C)] 97.8 °F (36.6 °C)  Pulse:  [73-87] 83  Resp:  [17-22] 22  SpO2:  [95 %-98 %] 98 %  BP: (134-194)/() 149/92     Weight: 54.8 kg (120 lb 13 oz)  Body mass index is 22.1 kg/m².  No intake or output data in the 24 hours ending 05/17/19 0922   Physical Exam   Constitutional: She is oriented to person, place, and time. She appears well-developed and well-nourished. No distress.   Lying comfortably. On HD.    HENT:   Head: Normocephalic.   Surgical site healing well   Neck: Neck supple.   Cardiovascular: Normal rate and regular rhythm.   Pulmonary/Chest: Effort normal and breath sounds normal.   Abdominal: There is no tenderness.   Distended    Neurological: She is alert and oriented to person, place, and time.   Skin: Skin is warm. No erythema.   Vitals reviewed.      Significant Labs: All pertinent labs within the past 24 hours have been reviewed.    Significant Imaging: I have reviewed all pertinent imaging results/findings within the past 24 hours.    Assessment/Plan:      * Nontraumatic subcortical hemorrhage of left cerebral hemisphere  S/P clot evacuation and resection of L temporal lesion with cranioplasty on 4/22  Neurosurgery following  Patient neurologically stable on exam  Staples removed without complication. Patient tolerated removal well. OK to get incision wet.  Continue Keppra and Vimpat for seizure prevention  Maintain SBP < 160  Platelet goal of > 40 K per Dr. Powell  Please continue to hold any anticoagulation or DVT prophylaxis   Follow up with Dr. Powell in 2 weeks with head CT. NSGY will schedule this appt.    Per NS, an additional dose of keppra  "500 mg to be given after HD on HD days  PTOT    Thrombocytopenia  Lab Results   Component Value Date    PLT 63 (L) 05/17/2019     Platelets improving and holding steady  No signs of bleeding.   likely secondary to splenic sequestration  HIT negative.   Hematology consulted:  1. if thrombocytopenia worsens, management could include splenic artery embolization vs. Radiation vs. Splenectomy  2. lack of plt response to transfusions is likely secondary to alloimunization  3. can transfuse for bleeding with plts less than 50 K or for lower treshold (such as 20-30K) even without bleeding    4. discussed her case with Dr. Powell; her platelet count is adequate and does not need to be raised.    Heme onc update given drop in platelets as of 5/13:  If platelets continue to drop, would first discuss with the Neurosurgical Service to determine at what level they feel that an intervention is needed to raise her platelet count.  If they feel that her platelet needs to be raised, would consider steroids initially (dexamethasone 40 mg QD x 4 days) and also interventions to reduce her splenic sequestration sich as splenic embolization or splenic XRT. Splenectomy is probably not a very good option at this point.    5/14: Case discussed with NS, tuan onc and IR. Platelet goal of >40K. Will continue with dexamethasone x 4 days with platelet transfusion to be given today, 5/14. Should this fail, then will plan for IR embolization most likely 5/20 if patient in agreement.     5/15: Platelets responded to 47K after transfusion. Patient stated to me that she "has to do what needs to be done" if embolization is needed. Will continue to monitor platelet response on dex until last dose scheduled to end on 5/17.    5/16: Platelets improved with above management. Cont to monitor.     5/17: Last dose of Dexamethasone due today. Platelets cont to improve.           Renovascular hypertension  BP Readings from Last 3 Encounters:   05/17/19 (!) 149/92 "   04/09/19 (!) 173/109   04/05/19 (!) 155/94     BPs elevated and uncontrolled. Maxed on all meds but imdur  Continue verapamil, irbesartan, clonidine, hydralazine, and carvedilol  imdur increased to 90 mg   Cont prn labetalol and hydralazine.     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

## 2019-05-17 NOTE — ASSESSMENT & PLAN NOTE
"Lab Results   Component Value Date    PLT 63 (L) 05/17/2019     Platelets improving and holding steady  No signs of bleeding.   likely secondary to splenic sequestration  HIT negative.   Hematology consulted:  1. if thrombocytopenia worsens, management could include splenic artery embolization vs. Radiation vs. Splenectomy  2. lack of plt response to transfusions is likely secondary to alloimunization  3. can transfuse for bleeding with plts less than 50 K or for lower treshold (such as 20-30K) even without bleeding    4. discussed her case with Dr. Powell; her platelet count is adequate and does not need to be raised.    Heme onc update given drop in platelets as of 5/13:  If platelets continue to drop, would first discuss with the Neurosurgical Service to determine at what level they feel that an intervention is needed to raise her platelet count.  If they feel that her platelet needs to be raised, would consider steroids initially (dexamethasone 40 mg QD x 4 days) and also interventions to reduce her splenic sequestration sich as splenic embolization or splenic XRT. Splenectomy is probably not a very good option at this point.    5/14: Case discussed with NS, tuan baxter and IR. Platelet goal of >40K. Will continue with dexamethasone x 4 days with platelet transfusion to be given today, 5/14. Should this fail, then will plan for IR embolization most likely 5/20 if patient in agreement.     5/15: Platelets responded to 47K after transfusion. Patient stated to me that she "has to do what needs to be done" if embolization is needed. Will continue to monitor platelet response on dex until last dose scheduled to end on 5/17.    5/16: Platelets improved with above management. Cont to monitor.     5/17: Last dose of Dexamethasone due today. Platelets cont to improve.         "

## 2019-05-17 NOTE — PLAN OF CARE
Problem: Adult Inpatient Plan of Care  Goal: Plan of Care Review  Plan of care reviewed with pt. Pt voiced understanding. Pt AAOx4. Pt denies any c/o pain/discomfort during the shift. No apparent distress noted. Fall precautions maintained. Bed in lowest position, and locked. Call light within reach and advised to call for assistance. Side rails x 2 and slip resistant socks on at this time. Pt had dialysis today, 3 liters were taken off. Pt tolerated well. Family at bedside, will continue to monitor.

## 2019-05-17 NOTE — NURSING
Pt b/p elevated upon assessment. B/P 160/104 at shift change. Pt medicated with scheduled antihypertensives per protocol (Carvedilol, Hydralazine and clonidine). Pt reassessed post med administration and b/p continues to increase b/p 194/109 @2249. IV hydralazine administered per protocol. Pt reassessed post med administration, b/p 179/112. Pt asymptomatic, resp even and unlabored. No acute distress noted. Will continue to monitor.    00:15- Pt reassessed, B/P 208/130 via [employer], 186/120 manual. MD notified of elevated B/P. New orders for antihypertensives to be given. Will continue to monitor.    00:30- Verapamil 240mg PO ordered x1 dose now per MD. Dose administered, will reassess and continue to monitor.     05:30- B/P 163/106 scheduled PO Hydralazine and Clonidine given per MD orders.

## 2019-05-17 NOTE — ASSESSMENT & PLAN NOTE
BP Readings from Last 3 Encounters:   05/17/19 (!) 149/92   04/09/19 (!) 173/109   04/05/19 (!) 155/94     BPs elevated and uncontrolled. Maxed on all meds but imdur  Continue verapamil, irbesartan, clonidine, hydralazine, and carvedilol  imdur increased to 90 mg   Cont prn labetalol and hydralazine.

## 2019-05-17 NOTE — PROGRESS NOTES
Progress Notes    SUBJECTIVE:   The pt is seen in dialysis today. She states that she feels much better currently, less abdominal pain and distention. Pt is not sure about discharge plans...denies any bleed or bruising.       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 ()    Moderate protein-calorie malnutrition 2017    MRSA bacteremia 2017    Pneumonia     Prophylactic immunotherapy 2014    Renovascular hypertension 10/2/2015    Secondary hyperparathyroidism 2017    Seizures     Sialadenitis 3/21/2018    Thrombocytopenia 2016     Past Surgical History:   Procedure Laterality Date    BIOPSY, LIVER, TRANSJUGULAR APPROACH N/A 2018    Performed [facility]    BIOPSY-LIVER N/A 2017    Per[facility]    BIOPSY-LIVER N/A 3/22/2016    [facility]     SECTION      x 2    CONIZATION-CERVICAL-LEEP N/A 6/15/2018    Performed by [facility]elam [facility]quin [facility] at [facility] OR    [id number]; upper extremity Left 2015     Performed by Idalia Diaz MD at [facility] OR 2ND FLR    CRANIOPLASTY  4/22/2019    Performed by Jose Luis Powell MD at [facility] OR 2ND FLR    CRANIOTOMY-- left crani for clot evac with microscrope Left 4/22/2019    Performed by Jose Luis Powell MD at [facility] OR 2ND FLR    EMBOLIZATION, BLOOD VESSEL N/A 7/21/2018    Performed by Aren Ramos MD at [facility] CATH LAB    Exam Under Anesthesia N/A 8/8/2018    Performed by Neelam Marroquin MD at [facility] OR 2ND FLR    Exam under anesthesia N/A 6/19/2018    Performed by [facility]am Marroquin MD at [facility] OR    Exam under anesthesia (ADD ON ) N/A 7/9/2018    Performed by NICKIE Alvarez MD at [facility] OR    Exam under anesthesia -cervical suturing  N/A 7/26/2018    Performed by Lei Sims III, MD at [facility] OR    EXCISION, NEOPLASM, SKULL with Cranioplasty Left 4/22/2019    Performed by Jose Luis Powell MD at [facility] OR 2ND FLR    FISTULOGRAM Left 12/4/2015    Performed by Idalia Diaz MD at [facility] CATH LAB    LIVER BIOPSY      LIVER TRANSPLANT  09/1992    PARATHYROIDECTOMY, Minimally Invasive Bilateral Exploration Bilateral 3/27/2019    Performed by Ashley Guallpa MD at [facility] OR 2ND FLR    SUTURE REPAIR,CERVIX  6/19/2018    Performed by [facility]am [facility]roquin [facility] at [facility] OR    TUBAL LIGATION  2010     Family History   Problem Relation Age of Onset    Hypertension Mother     Hypertension Father     Cancer Sister     Heart attack Maternal Uncle     Melanoma Neg Hx     Breast cancer Neg Hx     Colon cancer Neg Hx     Ovarian cancer Neg Hx      Social History     Tobacco Use    Smoking status: Never Smoker    Smokeless tobacco: Never Used   Substance Use Topics    Alcohol use: No    Drug use: No     Review of Systems   Constitutional: Negative for chills and fever.   Respiratory: Negative for shortness of breath.    Cardiovascular: Negative for chest pain.   Gastrointestinal: Negative for abdominal pain, nausea and vomiting.   Skin: Negative for rash.    Neurological: Negative for seizures and loss of consciousness.     OBJECTIVE:     Vital Signs:  Temp:  [97.8 °F (36.6 °C)-98.2 °F (36.8 °C)]   Pulse:  [71-87]   Resp:  [18-26]   BP: (147-186)/()   SpO2:  [95 %-98 %]     Physical Exam   Constitutional: She is oriented to person, place, and time. She appears well-developed and well-nourished. No distress.   Cardiovascular: Normal rate and regular rhythm.   Pulmonary/Chest: Effort normal and breath sounds normal. No stridor. No respiratory distress. She has no wheezes. She has no rales. She exhibits no tenderness.   Abdominal: Soft. Bowel sounds are normal. There is splenomegaly. There is no tenderness.   Neurological: She is alert and oriented to person, place, and time.   Psychiatric: She has a normal mood and affect.     Laboratory:  CBC:   Recent Labs   Lab 05/17/19 0529   WBC 4.68   RBC 2.79*   HGB 7.6*   HCT 23.1*   PLT 63*   MCV 83   MCH 27.2   MCHC 32.9     BMP:   Recent Labs   Lab 05/17/19 0529   *      K 5.3*      CO2 23   BUN 40*   CREATININE 5.5*   CALCIUM 8.7     CMP:   Recent Labs   Lab 05/17/19 0529   *   CALCIUM 8.7   ALBUMIN 2.7*   PROT 6.9      K 5.3*   CO2 23      BUN 40*   CREATININE 5.5*   ALKPHOS 545*   ALT 34   AST 37   BILITOT 0.5     LFTs:   Recent Labs   Lab 05/17/19 0529   ALT 34   AST 37   ALKPHOS 545*   BILITOT 0.5   PROT 6.9   ALBUMIN 2.7*     Coagulation:   Recent Labs   Lab 05/17/19 0529   LABPROT 11.3   INR 1.1       Diagnostic Results:      ASSESSMENT/PLAN:       Plan:     Acute on chronic Thrombocytopenia  - plts today improved 63 K today; s/p 1 U of plts on 5/14  - given dexamthasone 40 mg, day 4 today  - etiology likely hypersplenism   - noted to have a spleen previously of 19.7 cm on US Abdomen on 10/23/18  - Counts seem to be responding to dexamethasone 40mg. On day 3/4. IR consulted for possible splenic artery embolization later this week if patient fails to have response to  steroids. Given current improvement in plts, no need anymore for this procedure at this time  -  chronically, the plts have been noted to be low since 01/2015, with plt counts  Ranging from 40 K to 122 K.  - On day of her surgery, the pt was noted to have plt count of 56 K and pt has been flucutating in between before trending downwards  -  pt had 14plt units, 1 cryo, 1 prBC during this admission; the plt transfusion involved 2 units of plts on 5/8/19. Lack of complete plt response previously to transfusions is likely secondary to alloimunization.  - peripheral smear does not reveal schisctocytes, giant plts, or plt clumping  - H2 blockers stopped  - HIT antibody negative    To be discussed with Dr. Bud Wayne MD  Hematology/Oncology Fellow, PGY IV  [facility]

## 2019-05-17 NOTE — PROGRESS NOTES
Hemodialysis Note  Pt seen and evaluated while undergoing dialysis. Tolerating dialysis with current UFR, without complications. Labs reviewed and dialysate bath adjusted.      BFR: 400  UF goal: 2-3.0L as tolerated  Strict I/Os and chart    Anemia: Hgb 7.6; iSat 21/ferritin 905; hold epogen; start venofer 100 mg x 5 doses on HD    MBD:   Corrected Ca 9.7    K 5.3  Ph 1.5  Albumin 2.7    Diet: renal (low potassium); start nutritional supplement    HTN:  Admitted with Malignant HTN  Per MAR, pt with scheduled BP meds  Per MAR, pt medicated with verapamil CR at 0050  Per MAR, pt medicated with clonidine 0.3 and hydralazine 100 this AM  0538 and 0539, respectively  Pt currently on HD  Per HD RN, BP on presentation to [facility] 153/104; current /92  Per MAR, carvedilol 25mg po BID, irbesartan 300 mg daily, and isosorbide 90 mg daily available to be given  Per MAR, hydralazine and labetolol available PRN SBP >160  If BPs trend up, HD RN to medicate with irbesartan  Will continue to monitor

## 2019-05-18 NOTE — CONSULTS
Pt has  PIV that is patent and getting nothing IV. Please attempt new line if MD is not okay with extending access length of stay.

## 2019-05-18 NOTE — PROGRESS NOTES
[facility] Hospital Medicine  Progress Note    Patient Name: Holly Patel  MRN: [id number]  Patient Class: IP- Inpatient   Admission Date: 4/22/2019  Length of Stay: 26 days  Attending Physician: Zeenat Almanza MD  Primary Care Provider: Stan Sosa MD    Hospital Medicine Team: [facility] MED G Raymond Morel MD    Subjective:     Principal Problem:Nontraumatic subcortical hemorrhage of left cerebral hemisphere    HPI:  29 yo F w/ PMH significant for liver transplant in 1991, recent thyroidectomy on 3/27/2019, ESRD on HD (MWF), and epilepsy who presents to [facility] for higher level of care following planned surgical excision of L calvarial lesion s/p excision and cranioplasty 4/22/19. The pt presented to [facility] on 03/12/2019 with a complaint of headaches, among other symptoms, and received a workup that included a CT head which showed a left temporal calvarium lesion with mass effect. At that time she shared that she continued to have a headache, noting the pain was usually over the left temporal aspect of her head but migrated over to the right temporal aspect. She states the pain on the right is exacerbated with palpation of the area. She has no additional symptomatic complaints at this time. Currently stable following surgery. Denies acute pain, otherwise comfortable w/ non-focal neurological exam.      Hospital Course:  No notes on file    Interval History: No acute events. No new complaints. Platelets slightly lower than yesterday but above goal. Has completed treatment with steroids. No evidence of bleeding. Blood pressure remains slightly better.     Review of Systems   Constitutional: Negative for chills and fever.   HENT: Negative.    Eyes: Negative for visual disturbance.   Respiratory: Negative for cough, chest tightness and shortness of breath.    Cardiovascular: Negative for chest pain.   Gastrointestinal: Positive for abdominal distention. Negative for nausea and vomiting.    Musculoskeletal: Negative.    Neurological: Negative for light-headedness, numbness and headaches.     Objective:     Vital Signs (Most Recent):  Temp: 98.4 °F (36.9 °C) (05/18/19 0411)  Pulse: 83 (05/18/19 0712)  Resp: 18 (05/18/19 0411)  BP: (!) 175/114 (05/18/19 0411)  SpO2: 97 % (05/18/19 0411) Vital Signs (24h Range):  Temp:  [97.6 °F (36.4 °C)-98.4 °F (36.9 °C)] 98.4 °F (36.9 °C)  Pulse:  [70-94] 83  Resp:  [18-24] 18  SpO2:  [96 %-98 %] 97 %  BP: (134-180)/() 175/114     Weight: 54.8 kg (120 lb 13 oz)  Body mass index is 22.1 kg/m².    Intake/Output Summary (Last 24 hours) at 5/18/2019 1032  Last data filed at 5/17/2019 1245  Gross per 24 hour   Intake 600 ml   Output 3600 ml   Net -3000 ml      Physical Exam   Constitutional: She is oriented to person, place, and time. She appears well-developed and well-nourished. No distress.   Lying comfortably. On HD.    HENT:   Head: Normocephalic.   Surgical site healing well   Neck: Neck supple.   Cardiovascular: Normal rate and regular rhythm.   Pulmonary/Chest: Effort normal and breath sounds normal.   Abdominal: There is no tenderness.   Distended    Neurological: She is alert and oriented to person, place, and time.   Skin: Skin is warm. No erythema.   Vitals reviewed.      Significant Labs: All pertinent labs within the past 24 hours have been reviewed.    Significant Imaging: I have reviewed all pertinent imaging results/findings within the past 24 hours.    Assessment/Plan:      * Nontraumatic subcortical hemorrhage of left cerebral hemisphere  S/P clot evacuation and resection of L temporal lesion with cranioplasty on 4/22  Neurosurgery following  Patient neurologically stable on exam  Staples removed without complication. Patient tolerated removal well. OK to get incision wet.  Continue Keppra and Vimpat for seizure prevention  Maintain SBP < 160  Platelet goal of > 40 K per Dr. Powell  Please continue to hold any anticoagulation or DVT prophylaxis  "  Follow up with Dr. Powell in 2 weeks with head CT. NSGY will schedule this appt.    Per NS, an additional dose of keppra 500 mg to be given after HD on HD days  PTOT    Thrombocytopenia  Lab Results   Component Value Date    PLT 55 (L) 05/18/2019     Platelets improving and holding steady  No signs of bleeding.   likely secondary to splenic sequestration  HIT negative.   Hematology consulted:  1. if thrombocytopenia worsens, management could include splenic artery embolization vs. Radiation vs. Splenectomy  2. lack of plt response to transfusions is likely secondary to alloimunization  3. can transfuse for bleeding with plts less than 50 K or for lower treshold (such as 20-30K) even without bleeding    4. discussed her case with Dr. Powell; her platelet count is adequate and does not need to be raised.    Heme onc update given drop in platelets as of 5/13:  If platelets continue to drop, would first discuss with the Neurosurgical Service to determine at what level they feel that an intervention is needed to raise her platelet count.  If they feel that her platelet needs to be raised, would consider steroids initially (dexamethasone 40 mg QD x 4 days) and also interventions to reduce her splenic sequestration sich as splenic embolization or splenic XRT. Splenectomy is probably not a very good option at this point.    5/14: Case discussed with NS, heme onc and IR. Platelet goal of >40K. Will continue with dexamethasone x 4 days with platelet transfusion to be given today, 5/14. Should this fail, then will plan for IR embolization most likely 5/20 if patient in agreement.     5/15: Platelets responded to 47K after transfusion. Patient stated to me that she "has to do what needs to be done" if embolization is needed. Will continue to monitor platelet response on dex until last dose scheduled to end on 5/17.    5/16: Platelets improved with above management. Cont to monitor.     5/17: Last dose of Dexamethasone due today. " Platelets cont to improve.     5/18: completed treatment of steroids and platelets slightly trending down. Cont to monitor.           Renovascular hypertension  BP Readings from Last 3 Encounters:   05/18/19 (!) 175/114   04/09/19 (!) 173/109   04/05/19 (!) 155/94     BPs elevated and uncontrolled. Maxed on all meds but imdur  Continue verapamil, irbesartan, clonidine, hydralazine, and carvedilol  imdur increased to 90 mg   Cont prn labetalol and hydralazine.     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

## 2019-05-18 NOTE — ASSESSMENT & PLAN NOTE
Lab Results   Component Value Date    CALCIUM 8.0 (L) 05/18/2019    PHOS 1.8 (L) 05/18/2019     HD as scheduled  Ca to adjusted in dialysate bath   Hold calcitriol  Hold Ca carbonate

## 2019-05-18 NOTE — ASSESSMENT & PLAN NOTE
"Lab Results   Component Value Date    PLT 55 (L) 05/18/2019     Platelets improving and holding steady  No signs of bleeding.   likely secondary to splenic sequestration  HIT negative.   Hematology consulted:  1. if thrombocytopenia worsens, management could include splenic artery embolization vs. Radiation vs. Splenectomy  2. lack of plt response to transfusions is likely secondary to alloimunization  3. can transfuse for bleeding with plts less than 50 K or for lower treshold (such as 20-30K) even without bleeding    4. discussed her case with Dr. Powell; her platelet count is adequate and does not need to be raised.    Heme onc update given drop in platelets as of 5/13:  If platelets continue to drop, would first discuss with the Neurosurgical Service to determine at what level they feel that an intervention is needed to raise her platelet count.  If they feel that her platelet needs to be raised, would consider steroids initially (dexamethasone 40 mg QD x 4 days) and also interventions to reduce her splenic sequestration sich as splenic embolization or splenic XRT. Splenectomy is probably not a very good option at this point.    5/14: Case discussed with NS, tuan baxter and IR. Platelet goal of >40K. Will continue with dexamethasone x 4 days with platelet transfusion to be given today, 5/14. Should this fail, then will plan for IR embolization most likely 5/20 if patient in agreement.     5/15: Platelets responded to 47K after transfusion. Patient stated to me that she "has to do what needs to be done" if embolization is needed. Will continue to monitor platelet response on dex until last dose scheduled to end on 5/17.    5/16: Platelets improved with above management. Cont to monitor.     5/17: Last dose of Dexamethasone due today. Platelets cont to improve.     5/18: completed treatment of steroids and platelets slightly trending down. Cont to monitor.         "

## 2019-05-18 NOTE — PLAN OF CARE
Problem: Adult Inpatient Plan of Care  Goal: Plan of Care Review  Outcome: Ongoing (interventions implemented as appropriate)  Plan of care reviewed with pt. Pt voiced understanding. Pt AAOx3. Pt denies any c/o during the shift. No apparent distress noted. VSS. Fall precautions maintained. Bed in lowest position, and locked. Call light within reach and advised to call for assistance. Side rails x 2 and slip resistant socks on at this time. Will continue to monitor.

## 2019-05-18 NOTE — SUBJECTIVE & OBJECTIVE
Interval History: No acute events. No new complaints. Platelets slightly lower than yesterday but above goal. Has completed treatment with steroids. No evidence of bleeding. Blood pressure remains slightly better.     Review of Systems   Constitutional: Negative for chills and fever.   HENT: Negative.    Eyes: Negative for visual disturbance.   Respiratory: Negative for cough, chest tightness and shortness of breath.    Cardiovascular: Negative for chest pain.   Gastrointestinal: Positive for abdominal distention. Negative for nausea and vomiting.   Musculoskeletal: Negative.    Neurological: Negative for light-headedness, numbness and headaches.     Objective:     Vital Signs (Most Recent):  Temp: 98.4 °F (36.9 °C) (05/18/19 0411)  Pulse: 83 (05/18/19 0712)  Resp: 18 (05/18/19 0411)  BP: (!) 175/114 (05/18/19 0411)  SpO2: 97 % (05/18/19 0411) Vital Signs (24h Range):  Temp:  [97.6 °F (36.4 °C)-98.4 °F (36.9 °C)] 98.4 °F (36.9 °C)  Pulse:  [70-94] 83  Resp:  [18-24] 18  SpO2:  [96 %-98 %] 97 %  BP: (134-180)/() 175/114     Weight: 54.8 kg (120 lb 13 oz)  Body mass index is 22.1 kg/m².    Intake/Output Summary (Last 24 hours) at 5/18/2019 1032  Last data filed at 5/17/2019 1245  Gross per 24 hour   Intake 600 ml   Output 3600 ml   Net -3000 ml      Physical Exam   Constitutional: She is oriented to person, place, and time. She appears well-developed and well-nourished. No distress.   Lying comfortably. On HD.    HENT:   Head: Normocephalic.   Surgical site healing well   Neck: Neck supple.   Cardiovascular: Normal rate and regular rhythm.   Pulmonary/Chest: Effort normal and breath sounds normal.   Abdominal: There is no tenderness.   Distended    Neurological: She is alert and oriented to person, place, and time.   Skin: Skin is warm. No erythema.   Vitals reviewed.      Significant Labs: All pertinent labs within the past 24 hours have been reviewed.    Significant Imaging: I have reviewed all pertinent  imaging results/findings within the past 24 hours.

## 2019-05-18 NOTE — ASSESSMENT & PLAN NOTE
BP Readings from Last 3 Encounters:   05/18/19 (!) 175/114   04/09/19 (!) 173/109   04/05/19 (!) 155/94     BPs elevated and uncontrolled. Maxed on all meds but imdur  Continue verapamil, irbesartan, clonidine, hydralazine, and carvedilol  imdur increased to 90 mg   Cont prn labetalol and hydralazine.

## 2019-05-19 NOTE — ASSESSMENT & PLAN NOTE
"Lab Results   Component Value Date    PLT 77 (L) 05/19/2019     Platelets improving and holding steady  No signs of bleeding.   likely secondary to splenic sequestration  HIT negative.   Hematology consulted:  1. if thrombocytopenia worsens, management could include splenic artery embolization vs. Radiation vs. Splenectomy  2. lack of plt response to transfusions is likely secondary to alloimunization  3. can transfuse for bleeding with plts less than 50 K or for lower treshold (such as 20-30K) even without bleeding    4. discussed her case with Dr. Powell; her platelet count is adequate and does not need to be raised.    Heme onc update given drop in platelets as of 5/13:  If platelets continue to drop, would first discuss with the Neurosurgical Service to determine at what level they feel that an intervention is needed to raise her platelet count.  If they feel that her platelet needs to be raised, would consider steroids initially (dexamethasone 40 mg QD x 4 days) and also interventions to reduce her splenic sequestration sich as splenic embolization or splenic XRT. Splenectomy is probably not a very good option at this point.    5/14: Case discussed with NS, tuan baxter and IR. Platelet goal of >40K. Will continue with dexamethasone x 4 days with platelet transfusion to be given today, 5/14. Should this fail, then will plan for IR embolization most likely 5/20 if patient in agreement.     5/15: Platelets responded to 47K after transfusion. Patient stated to me that she "has to do what needs to be done" if embolization is needed. Will continue to monitor platelet response on dex until last dose scheduled to end on 5/17.    5/16: Platelets improved with above management. Cont to monitor.     5/17: Last dose of Dexamethasone due today. Platelets cont to improve.     5/18: completed treatment of steroids and platelets slightly trending down. Cont to monitor.     5/19: improved to 77K; no need for splenic embolization; ok " to dc from heme standpoint

## 2019-05-19 NOTE — PROGRESS NOTES
[facility] Hospital Medicine  Progress Note    Patient Name: Holly Patel  MRN: [id number]  Patient Class: IP- Inpatient   Admission Date: 4/22/2019  Length of Stay: 27 days  Attending Physician: Zeenat Almanza MD  Primary Care Provider: Stan Sosa MD    Hospital Medicine Team: [facility] Zeenat Almanza MD    Subjective:     Principal Problem:Nontraumatic subcortical hemorrhage of left cerebral hemisphere    HPI:  27 yo F w/ PMH significant for liver transplant in 1991, recent thyroidectomy on 3/27/2019, ESRD on HD (MWF), and epilepsy who presents to [facility] for higher level of care following planned surgical excision of L calvarial lesion s/p excision and cranioplasty 4/22/19. The pt presented to [facility] on 03/12/2019 with a complaint of headaches, among other symptoms, and received a workup that included a CT head which showed a left temporal calvarium lesion with mass effect. At that time she shared that she continued to have a headache, noting the pain was usually over the left temporal aspect of her head but migrated over to the right temporal aspect. She states the pain on the right is exacerbated with palpation of the area. She has no additional symptomatic complaints at this time. Currently stable following surgery. Denies acute pain, otherwise comfortable w/ non-focal neurological exam.      Hospital Course:  No notes on file    Interval History: No acute events. Platelets continue to improve.     Review of Systems   Constitutional: Negative for chills and fever.   HENT: Negative.    Eyes: Negative for visual disturbance.   Respiratory: Negative for cough, chest tightness and shortness of breath.    Cardiovascular: Negative for chest pain.   Gastrointestinal: Positive for abdominal distention. Negative for nausea and vomiting.   Musculoskeletal: Negative.    Neurological: Negative for light-headedness, numbness and headaches.     Objective:     Vital Signs (Most  Recent):  Temp: 97.8 °F (36.6 °C) (05/19/19 1235)  Pulse: 73 (05/19/19 1539)  Resp: 18 (05/19/19 1235)  BP: 125/73 (05/19/19 1235)  SpO2: 98 % (05/19/19 1235) Vital Signs (24h Range):  Temp:  [97.8 °F (36.6 °C)-98.5 °F (36.9 °C)] 97.8 °F (36.6 °C)  Pulse:  [73-89] 73  Resp:  [16-18] 18  SpO2:  [94 %-100 %] 98 %  BP: (125-194)/() 125/73     Weight: 54.8 kg (120 lb 13 oz)  Body mass index is 22.1 kg/m².  No intake or output data in the 24 hours ending 05/19/19 1555   Physical Exam   Constitutional: She is oriented to person, place, and time. She appears well-developed and well-nourished. No distress.   Sitting up comfortably   HENT:   Head: Normocephalic.   Surgical site healing well   Neck: Neck supple.   Cardiovascular: Normal rate and regular rhythm.   Pulmonary/Chest: Effort normal and breath sounds normal.   Abdominal: There is no tenderness.   Distended    Neurological: She is alert and oriented to person, place, and time.   Skin: Skin is warm. No erythema.   Vitals reviewed.      Significant Labs: All pertinent labs within the past 24 hours have been reviewed.    Significant Imaging: I have reviewed all pertinent imaging results/findings within the past 24 hours.    Assessment/Plan:      * Nontraumatic subcortical hemorrhage of left cerebral hemisphere  S/P clot evacuation and resection of L temporal lesion with cranioplasty on 4/22  Neurosurgery following  Patient neurologically stable on exam  Staples removed without complication. Patient tolerated removal well. OK to get incision wet.  Continue Keppra and Vimpat for seizure prevention  Maintain SBP < 160  Platelet goal of > 40 K per Dr. Powell  Please continue to hold any anticoagulation or DVT prophylaxis   Follow up with Dr. Powell in 2 weeks with head CT. NSGY will schedule this appt.    Per NS, an additional dose of keppra 500 mg to be given after HD on HD days  PTOT    Brain tumor  Per above       Hypocalcemia  Lab Results   Component Value Date     "CALCIUM 8.0 (L) 05/18/2019    PHOS 1.8 (L) 05/18/2019     HD as scheduled  Ca to adjusted in dialysate bath   Hold calcitriol  Hold Ca carbonate     Thrombocytopenia  Lab Results   Component Value Date    PLT 77 (L) 05/19/2019     Platelets improving and holding steady  No signs of bleeding.   likely secondary to splenic sequestration  HIT negative.   Hematology consulted:  1. if thrombocytopenia worsens, management could include splenic artery embolization vs. Radiation vs. Splenectomy  2. lack of plt response to transfusions is likely secondary to alloimunization  3. can transfuse for bleeding with plts less than 50 K or for lower treshold (such as 20-30K) even without bleeding    4. discussed her case with Dr. Powell; her platelet count is adequate and does not need to be raised.    Heme onc update given drop in platelets as of 5/13:  If platelets continue to drop, would first discuss with the Neurosurgical Service to determine at what level they feel that an intervention is needed to raise her platelet count.  If they feel that her platelet needs to be raised, would consider steroids initially (dexamethasone 40 mg QD x 4 days) and also interventions to reduce her splenic sequestration sich as splenic embolization or splenic XRT. Splenectomy is probably not a very good option at this point.    5/14: Case discussed with NS, heme onc and IR. Platelet goal of >40K. Will continue with dexamethasone x 4 days with platelet transfusion to be given today, 5/14. Should this fail, then will plan for IR embolization most likely 5/20 if patient in agreement.     5/15: Platelets responded to 47K after transfusion. Patient stated to me that she "has to do what needs to be done" if embolization is needed. Will continue to monitor platelet response on dex until last dose scheduled to end on 5/17.    5/16: Platelets improved with above management. Cont to monitor.     5/17: Last dose of Dexamethasone due today. Platelets cont to " improve.     5/18: completed treatment of steroids and platelets slightly trending down. Cont to monitor.     5/19: improved to 77K; no need for splenic embolization; ok to dc from heme standpoint          Seizures  Cont home meds of vimpat and keppra    Moderate protein-calorie malnutrition  Cont current diet  Encourage oral intake    Secondary hyperparathyroidism        Immunosuppressed  Per liver transplant       Renovascular hypertension  BP Readings from Last 3 Encounters:   05/18/19 (!) 175/114   04/09/19 (!) 173/109   04/05/19 (!) 155/94     BPs elevated and uncontrolled. Maxed on all meds but imdur  Continue verapamil, irbesartan, clonidine, hydralazine, and carvedilol  imdur increased to 90 mg   Cont prn labetalol and hydralazine.     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

## 2019-05-19 NOTE — SUBJECTIVE & OBJECTIVE
Interval History: No acute events. Platelets continue to improve.     Review of Systems   Constitutional: Negative for chills and fever.   HENT: Negative.    Eyes: Negative for visual disturbance.   Respiratory: Negative for cough, chest tightness and shortness of breath.    Cardiovascular: Negative for chest pain.   Gastrointestinal: Positive for abdominal distention. Negative for nausea and vomiting.   Musculoskeletal: Negative.    Neurological: Negative for light-headedness, numbness and headaches.     Objective:     Vital Signs (Most Recent):  Temp: 97.8 °F (36.6 °C) (05/19/19 1235)  Pulse: 73 (05/19/19 1539)  Resp: 18 (05/19/19 1235)  BP: 125/73 (05/19/19 1235)  SpO2: 98 % (05/19/19 1235) Vital Signs (24h Range):  Temp:  [97.8 °F (36.6 °C)-98.5 °F (36.9 °C)] 97.8 °F (36.6 °C)  Pulse:  [73-89] 73  Resp:  [16-18] 18  SpO2:  [94 %-100 %] 98 %  BP: (125-194)/() 125/73     Weight: 54.8 kg (120 lb 13 oz)  Body mass index is 22.1 kg/m².  No intake or output data in the 24 hours ending 05/19/19 1555   Physical Exam   Constitutional: She is oriented to person, place, and time. She appears well-developed and well-nourished. No distress.   Sitting up comfortably   HENT:   Head: Normocephalic.   Surgical site healing well   Neck: Neck supple.   Cardiovascular: Normal rate and regular rhythm.   Pulmonary/Chest: Effort normal and breath sounds normal.   Abdominal: There is no tenderness.   Distended    Neurological: She is alert and oriented to person, place, and time.   Skin: Skin is warm. No erythema.   Vitals reviewed.      Significant Labs: All pertinent labs within the past 24 hours have been reviewed.    Significant Imaging: I have reviewed all pertinent imaging results/findings within the past 24 hours.

## 2019-05-20 NOTE — PLAN OF CARE
Problem: SLP Goal  Goal: SLP Goal  Goals to be met by 5/23:  1. Pt will tolerate diet of thin liquids and dental soft solids without overt clinical signs of aspiration   2. Pt will participate in trials of regular solids within speech therapy sessions to help determine least restrictive diet   3. Pt will demonstrate orientation to place, situation , family members, date w/ mod cues   4. Pt will generate 4 items per category w/ mod cues to enhance organizations skills   5. Pt will label items w/ 60%acc w/ mod cues to enhance verbal expression skills   6. Pt will participate in ongoing assessment of speech language and cognitive linguistic skills to help rule out deficits and determine therapeutic plan of care   7. Pt will complete three step commands with 80% acc given min A.           Pt with good progress towards goals     Josselyn Medeiros MS, CCC-SLP  Speech Language Pathologist  Pager: [phone]  Date 5/20/2019

## 2019-05-20 NOTE — ASSESSMENT & PLAN NOTE
"Lab Results   Component Value Date    PLT 84 (L) 05/20/2019     Platelets improving and holding steady  No signs of bleeding.   likely secondary to splenic sequestration  HIT negative.   Hematology consulted:  1. if thrombocytopenia worsens, management could include splenic artery embolization vs. Radiation vs. Splenectomy  2. lack of plt response to transfusions is likely secondary to alloimunization  3. can transfuse for bleeding with plts less than 50 K or for lower treshold (such as 20-30K) even without bleeding    4. discussed her case with Dr. Powell; her platelet count is adequate and does not need to be raised.    Heme onc update given drop in platelets as of 5/13:  If platelets continue to drop, would first discuss with the Neurosurgical Service to determine at what level they feel that an intervention is needed to raise her platelet count.  If they feel that her platelet needs to be raised, would consider steroids initially (dexamethasone 40 mg QD x 4 days) and also interventions to reduce her splenic sequestration sich as splenic embolization or splenic XRT. Splenectomy is probably not a very good option at this point.    5/14: Case discussed with NS, tuan baxter and IR. Platelet goal of >40K. Will continue with dexamethasone x 4 days with platelet transfusion to be given today, 5/14. Should this fail, then will plan for IR embolization most likely 5/20 if patient in agreement.     5/15: Platelets responded to 47K after transfusion. Patient stated to me that she "has to do what needs to be done" if embolization is needed. Will continue to monitor platelet response on dex until last dose scheduled to end on 5/17.    5/16: Platelets improved with above management. Cont to monitor.     5/17: Last dose of Dexamethasone due today. Platelets cont to improve.     5/18: completed treatment of steroids and platelets slightly trending down. Cont to monitor.     5/19 & 20: improved; no need for splenic embolization; ok " to dc from heme standpoint

## 2019-05-20 NOTE — PT/OT/SLP PROGRESS
Occupational Therapy      Patient Name:  Holly Patel   MRN:  [id number]    Patient not seen today secondary to pt IVANA to HD.   Will follow up at later and more appropriate time.    RENA Horta  5/20/2019

## 2019-05-20 NOTE — PROGRESS NOTES
Dialysis tx completed. 4 hours. 2.5 liters removed. Needles pulled from left arm AVF. Hemostasis achieved. Gauze and tape to sites. Tolerated tx well. Report called to Cyndee PRUITT.

## 2019-05-20 NOTE — PROGRESS NOTES
[facility] Hospital Medicine  Progress Note    Patient Name: Holly Patel  MRN: [id number]  Patient Class: IP- Inpatient   Admission Date: 4/22/2019  Length of Stay: 28 days  Attending Physician: Zeenat Almanza MD  Primary Care Provider: Stan Sosa MD    Hospital Medicine Team: [facility] G Zeenat Almanza MD    Subjective:     Principal Problem:Nontraumatic subcortical hemorrhage of left cerebral hemisphere    HPI:  29 yo F w/ PMH significant for liver transplant in 1991, recent thyroidectomy on 3/27/2019, ESRD on HD (MWF), and epilepsy who presents to [facility] for higher level of care following planned surgical excision of L calvarial lesion s/p excision and cranioplasty 4/22/19. The pt presented to [facility] on 03/12/2019 with a complaint of headaches, among other symptoms, and received a workup that included a CT head which showed a left temporal calvarium lesion with mass effect. At that time she shared that she continued to have a headache, noting the pain was usually over the left temporal aspect of her head but migrated over to the right temporal aspect. She states the pain on the right is exacerbated with palpation of the area. She has no additional symptomatic complaints at this time. Currently stable following surgery. Denies acute pain, otherwise comfortable w/ non-focal neurological exam.      Hospital Course:  No notes on file    Interval History: No acute events. Platelets continue to improve.     Review of Systems   Constitutional: Negative for chills and fever.   HENT: Negative.    Eyes: Negative for visual disturbance.   Respiratory: Negative for cough, chest tightness and shortness of breath.    Cardiovascular: Negative for chest pain.   Gastrointestinal: Positive for abdominal distention. Negative for nausea and vomiting.   Musculoskeletal: Negative.    Neurological: Negative for light-headedness, numbness and headaches.     Objective:     Vital Signs (Most  Recent):  Temp: 98 °F (36.7 °C) (05/20/19 1315)  Pulse: 76 (05/20/19 1315)  Resp: 18 (05/20/19 0727)  BP: (!) 154/93 (05/20/19 1315)  SpO2: 100 % (05/20/19 0727) Vital Signs (24h Range):  Temp:  [97.8 °F (36.6 °C)-98.5 °F (36.9 °C)] 98 °F (36.7 °C)  Pulse:  [73-88] 76  Resp:  [18] 18  SpO2:  [96 %-100 %] 100 %  BP: (144-211)/() 154/93     Weight: 54.8 kg (120 lb 13 oz)  Body mass index is 22.1 kg/m².    Intake/Output Summary (Last 24 hours) at 5/20/2019 1348  Last data filed at 5/19/2019 1742  Gross per 24 hour   Intake --   Output 0 ml   Net 0 ml      Physical Exam   Constitutional: She is oriented to person, place, and time. She appears well-developed and well-nourished. No distress.   HENT:   Head: Normocephalic.   Surgical site healing well   Neck: Neck supple.   Cardiovascular: Normal rate and regular rhythm.   Pulmonary/Chest: Effort normal and breath sounds normal.   Abdominal: There is no tenderness.   Distended    Neurological: She is alert and oriented to person, place, and time.   Skin: Skin is warm. No erythema.   Vitals reviewed.      Significant Labs: All pertinent labs within the past 24 hours have been reviewed.    Significant Imaging: I have reviewed all pertinent imaging results/findings within the past 24 hours.    Assessment/Plan:      * Nontraumatic subcortical hemorrhage of left cerebral hemisphere  S/P clot evacuation and resection of L temporal lesion with cranioplasty on 4/22  Neurosurgery following  Patient neurologically stable on exam  Staples removed without complication. Patient tolerated removal well. OK to get incision wet.  Continue Keppra and Vimpat for seizure prevention  Maintain SBP < 160  Platelet goal of > 40 K per Dr. Powell  Please continue to hold any anticoagulation or DVT prophylaxis   Follow up with Dr. Powell in 2 weeks with head CT. NSGY will schedule this appt.    Per NS, an additional dose of keppra 500 mg to be given after HD on HD days  PTOT    [facility]    Brain tumor  Per above       Hypocalcemia  Lab Results   Component Value Date    CALCIUM 8.0 (L) 05/18/2019    PHOS 1.8 (L) 05/18/2019     HD as scheduled  Ca to adjusted in dialysate bath   Hold calcitriol  Hold Ca carbonate     Thrombocytopenia  Lab Results   Component Value Date    PLT 84 (L) 05/20/2019     Platelets improving and holding steady  No signs of bleeding.   likely secondary to splenic sequestration  HIT negative.   Hematology consulted:  1. if thrombocytopenia worsens, management could include splenic artery embolization vs. Radiation vs. Splenectomy  2. lack of plt response to transfusions is likely secondary to alloimunization  3. can transfuse for bleeding with plts less than 50 K or for lower treshold (such as 20-30K) even without bleeding    4. discussed her case with Dr. Powell; her platelet count is adequate and does not need to be raised.    Heme onc update given drop in platelets as of 5/13:  If platelets continue to drop, would first discuss with the Neurosurgical Service to determine at what level they feel that an intervention is needed to raise her platelet count.  If they feel that her platelet needs to be raised, would consider steroids initially (dexamethasone 40 mg QD x 4 days) and also interventions to reduce her splenic sequestration sich as splenic embolization or splenic XRT. Splenectomy is probably not a very good option at this point.    5/14: Case discussed with NS, tuan onc and IR. Platelet goal of >40K. Will continue with dexamethasone x 4 days with platelet transfusion to be given today, 5/14. Should this fail, then will plan for IR embolization most likely 5/20 if patient in agreement.     5/15: Platelets responded to 47K after transfusion. Patient stated to me that she "has to do what needs to be done" if embolization is needed. Will continue to monitor platelet response on dex until last dose scheduled to end on 5/17.    5/16: Platelets improved with " above management. Cont to monitor.     5/17: Last dose of Dexamethasone due today. Platelets cont to improve.     5/18: completed treatment of steroids and platelets slightly trending down. Cont to monitor.     5/19 & 20: improved; no need for splenic embolization; ok to dc from heme standpoint          Seizures  Cont home meds of vimpat and keppra    Moderate protein-calorie malnutrition  Cont current diet  Encourage oral intake    Secondary hyperparathyroidism        Immunosuppressed  Per liver transplant       Renovascular hypertension  BP Readings from Last 3 Encounters:   05/18/19 (!) 175/114   04/09/19 (!) 173/109   04/05/19 (!) 155/94     BPs elevated and uncontrolled. Maxed on all meds but imdur  Continue verapamil, irbesartan, clonidine, hydralazine, and carvedilol  imdur increased to 90 mg   Cont prn labetalol and hydralazine.     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

## 2019-05-20 NOTE — SUBJECTIVE & OBJECTIVE
Interval History: No acute events. Platelets continue to improve.     Review of Systems   Constitutional: Negative for chills and fever.   HENT: Negative.    Eyes: Negative for visual disturbance.   Respiratory: Negative for cough, chest tightness and shortness of breath.    Cardiovascular: Negative for chest pain.   Gastrointestinal: Positive for abdominal distention. Negative for nausea and vomiting.   Musculoskeletal: Negative.    Neurological: Negative for light-headedness, numbness and headaches.     Objective:     Vital Signs (Most Recent):  Temp: 98 °F (36.7 °C) (05/20/19 1315)  Pulse: 76 (05/20/19 1315)  Resp: 18 (05/20/19 0727)  BP: (!) 154/93 (05/20/19 1315)  SpO2: 100 % (05/20/19 0727) Vital Signs (24h Range):  Temp:  [97.8 °F (36.6 °C)-98.5 °F (36.9 °C)] 98 °F (36.7 °C)  Pulse:  [73-88] 76  Resp:  [18] 18  SpO2:  [96 %-100 %] 100 %  BP: (144-211)/() 154/93     Weight: 54.8 kg (120 lb 13 oz)  Body mass index is 22.1 kg/m².    Intake/Output Summary (Last 24 hours) at 5/20/2019 1348  Last data filed at 5/19/2019 1742  Gross per 24 hour   Intake --   Output 0 ml   Net 0 ml      Physical Exam   Constitutional: She is oriented to person, place, and time. She appears well-developed and well-nourished. No distress.   HENT:   Head: Normocephalic.   Surgical site healing well   Neck: Neck supple.   Cardiovascular: Normal rate and regular rhythm.   Pulmonary/Chest: Effort normal and breath sounds normal.   Abdominal: There is no tenderness.   Distended    Neurological: She is alert and oriented to person, place, and time.   Skin: Skin is warm. No erythema.   Vitals reviewed.      Significant Labs: All pertinent labs within the past 24 hours have been reviewed.    Significant Imaging: I have reviewed all pertinent imaging results/findings within the past 24 hours.

## 2019-05-20 NOTE — PLAN OF CARE
OFELIA spoke with Piper with  Rehab ([phone]) regarding transfer/DC.  Piper stated that she would be printing up orders and presenting to staff Dr to see if pt could transfer today vs. 5/21.  OFELIA will continue to F/U.        Tova Valverde, TANIYA  Ext [phone]

## 2019-05-20 NOTE — PLAN OF CARE
Problem: Adult Inpatient Plan of Care  Goal: Plan of Care Review  Outcome: Ongoing (interventions implemented as appropriate)  Plan of care reviewed with pt. Pt voiced understanding. Pt AAOx3. Pt c/o back and abdominal pain during the shift. PRN pain med given x1. No apparent distress noted. VSS. Fall precautions maintained. Bed in lowest position, and locked. Call light within reach and advised to call for assistance. Side rails x 2 and slip resistant socks on at this time. Will continue to monitor.

## 2019-05-20 NOTE — ASSESSMENT & PLAN NOTE
S/P clot evacuation and resection of L temporal lesion with cranioplasty on 4/22  Neurosurgery following  Patient neurologically stable on exam  Staples removed without complication. Patient tolerated removal well. OK to get incision wet.  Continue Keppra and Vimpat for seizure prevention  Maintain SBP < 160  Platelet goal of > 40 K per Dr. Powell  Please continue to hold any anticoagulation or DVT prophylaxis   Follow up with Dr. Powell in 2 weeks with head CT. NSGY will schedule this appt.    Per NS, an additional dose of keppra 500 mg to be given after HD on HD days  PTOT    Awaiting transfer to [facility]

## 2019-05-20 NOTE — PLAN OF CARE
[facility]    FACILITY TRANSFER ORDERS      Patient Name: Holly Patel  YOB: 1990    PCP: Stan Sosa MD   PCP Address: [facility] [address] / [place] [address]  PCP Phone Number: [phone]  PCP Fax: [phone]    Encounter Date: 05/21/2019    Admit to: [facility]    Vital Signs:  Routine    Diagnoses:   Active Hospital Problems    Diagnosis  POA    *Nontraumatic subcortical hemorrhage of left cerebral hemisphere [I61.0]  Yes    Brain tumor [D49.6]  Yes    Hypocalcemia [E83.51]  Yes    Thrombocytopenia [D69.6]  Yes    Seizures [R56.9]  Yes     No seizures in about 8 months. Episodes were likely provoked in the setting of acute illness. She should continue AED medications as long as they are well tolerated and causing no side effects until she is seizure free about one year, then at that time we can discuss weaning medications if she chooses.     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 , Occupational Therapy to evaluate and treat. and Speech Therapy to evaluate and treat for Swallowing.    MISCELLANEOUS CARE:  none    WOUND CARE ORDERS  None    Medications: Review discharge medications with patient and family and provide education.      Current Discharge Medication List      START taking these medications    Details   bacitracin 500 unit/gram ointment Apply topically as needed.  Refills: 0      bisacodyl (DULCOLAX) 10 mg Supp Place 1 suppository (10 mg total) rectally once daily.  Refills: 0      epoetin anca-epbx (RETACRIT) 3,000 unit/mL injection Inject 1 mL (3,000 Units total) into the skin every Mon, Wed, Fri.  Qty: 1 vial, Refills: 3      isosorbide mononitrate (IMDUR) 30 MG 24 hr tablet Take 3 tablets (90 mg total) by mouth once daily.  Qty: 90 tablet, Refills: 11      !! levETIRAcetam (KEPPRA) 500 MG Tab Take 1 tablet (500 mg total) by mouth every Mon, Wed, Fri.  Qty: 12 tablet, Refills: 11 after HD on MWF      ondansetron (ZOFRAN-ODT) 4 MG TbDL Take 1 tablet (4 mg total) by mouth every 6 (six) hours as needed.  Qty: 40 tablet, Refills: 3      oxyCODONE (ROXICODONE) 5 MG immediate release tablet Take 1 tablet (5 mg total) by mouth every 6 (six) hours as needed.  Qty: 40 tablet, Refills: 0      pantoprazole (PROTONIX) 40 MG tablet Take 1 tablet (40 mg total) by mouth once daily.  Qty: 30 tablet, Refills: 11      polyethylene glycol (GLYCOLAX) 17 gram PwPk Take 17 g by mouth 2 (two) times daily.  Qty: 60 packet, Refills: 3      senna-docusate 8.6-50 mg (PERICOLACE) 8.6-50 mg per tablet Take 2 tablets by mouth once daily.       !! - Potential duplicate medications found. Please discuss with provider.      CONTINUE these medications which have CHANGED    Details   cloNIDine (CATAPRES) 0.3 MG tablet Take 1 tablet (0.3 mg total) by mouth every 8 (eight) hours.  Qty: 90 tablet, Refills: 11      tacrolimus (PROGRAF) 4 mg PO twice daily at 8 am and 6 pm           CONTINUE these  medications which have NOT CHANGED    Details   carvedilol (COREG) 25 MG tablet Take 1 tablet (25 mg total) by mouth 2 (two) times daily.  Qty: 60 tablet, Refills: 11    Associated Diagnoses: Renovascular hypertension      hydrALAZINE (APRESOLINE) 100 MG tablet Take 1 tablet (100 mg total) by mouth every 8 (eight) hours.  Qty: 90 tablet, Refills: 11    Associated Diagnoses: Hypertension, unspecified type      lacosamide (VIMPAT) 50 mg Tab Take 1 tablet (50 mg total) by mouth 2 (two) times daily.  Qty: 60 tablet, Refills: 11    Associated Diagnoses: Seizures      !! levETIRAcetam (KEPPRA) 500 MG Tab Take 1 tablet (500 mg total) by mouth 2 (two) times daily.  Qty: 60 tablet, Refills: 11    Associated Diagnoses: Seizures      verapamil (CALAN-SR) 240 MG CR tablet Take 1 tablet (240 mg total) by mouth every evening.  Qty: 90 tablet, Refills: 3      irbesartan (AVAPRO) 300 MG tablet Take 1 tablet (300 mg total) by mouth every morning.  Qty: 30 tablet, Refills: 3    Associated Diagnoses: Hypertension, unspecified type       !! - Potential duplicate medications found. Please discuss with provider.      STOP taking these medications       calcitRIOL (ROCALTROL) 0.5 MCG Cap Comments:   Reason for Stopping:         calcium carbonate (OS-WOLFGANG) 500 mg calcium (1,250 mg) tablet Comments:   Reason for Stopping:         dicyclomine (BENTYL) 20 mg tablet Comments:   Reason for Stopping:         famotidine (PEPCID) 40 MG tablet Comments:   Reason for Stopping:         ergocalciferol (ERGOCALCIFEROL) 50,000 unit Cap Comments:   Reason for Stopping:         triamcinolone acetonide 0.1% (KENALOG) 0.1 % ointment Comments:   Reason for Stopping:                    _________________________________  Zeenat Almanza MD  05/21/2019

## 2019-05-20 NOTE — PT/OT/SLP PROGRESS
"Speech Language Pathology Treatment    Patient Name:  Holly Patel   MRN:  [id number]  Admitting Diagnosis: Nontraumatic subcortical hemorrhage of left cerebral hemisphere    Recommendations:                 General Recommendations:  Speech/language therapy  Diet recommendations:  Regular, Liquid Diet Level: Thin   Aspiration Precautions: 1 bite/sip at a time, Alternating bites/sips, Avoid talking while eating, Eliminate distractions, Feed only when awake/alert, HOB to 90 degrees, Monitor for s/s of aspiration, Small bites/sips and Standard aspiration precautions   General Precautions: Standard, fall, seizure, dental soft  Communication strategies:  go to room if call light pushed; pt with aphasia    Subjective     " I need to get out of here"   Pt awake alert and cooperative   Pt mother at the bedside     Pain/Comfort:  Pain Rating 1: 0/10  Pain Rating Post-Intervention 1: 0/10no pain pre/post     Objective:     Has the patient been evaluated by SLP for swallowing?   Yes  Keep patient NPO? No   Current Respiratory Status: room air      Pt and mother reporting significant frustration that potential plans to discharge this PM to rehab have been postponed. SLP offered guidance and support within SLP scope. Pt completed category inclusion tasks with 70% acc independently. Pt able to provide and average of 2 items per concrete category independently with divergent category naming.  Pt persistently perseverating on "yellow and soccer" during category naming tasks. Pt benefits greatly from semantic descriptions and phonemic cueing which increased her ability to provide 2-3 additional items per category. Pt demonstrated ability to label common objects and colors with 40% acc independently this date increasing to 70% acc with SLP phonemic cueing.  No phonemic paraphasias or neologisms observed during therapy tasks this date. Speech will continue to follow.     Assessment:     Holly Patel is a 28 y.o. " female with an SLP diagnosis of Aphasia and Cognitive-Linguistic Impairment.      Goals:   Multidisciplinary Problems     SLP Goals        Problem: SLP Goal    Goal Priority Disciplines Outcome   SLP Goal     SLP Ongoing (interventions implemented as appropriate)   Description:  Goals to be met by 5/23:  1. Pt will tolerate diet of thin liquids and dental soft solids without overt clinical signs of aspiration   2. Pt will participate in trials of regular solids within speech therapy sessions to help determine least restrictive diet   3. Pt will demonstrate orientation to place, situation , family members, date w/ mod cues   4. Pt will generate 4 items per category w/ mod cues to enhance organizations skills   5. Pt will label items w/ 60%acc w/ mod cues to enhance verbal expression skills   6. Pt will participate in ongoing assessment of speech language and cognitive linguistic skills to help rule out deficits and determine therapeutic plan of care   7. Pt will complete three step commands with 80% acc given min A.                          Plan:     · Patient to be seen:  4 x/week   · Plan of Care expires:     · Plan of Care reviewed with:  patient, daughter   · SLP Follow-Up:  Yes       Discharge recommendations:  rehabilitation facility     Time Tracking:     SLP Treatment Date:   05/20/19  Speech Start Time:  1426  Speech Stop Time:  1439     Speech Total Time (min):  13 min    Billable Minutes: Speech Therapy Individual 13    Josselyn Medeiros CCC-SLP  05/20/2019 5/20/2019

## 2019-05-20 NOTE — PLAN OF CARE
Problem: Adult Inpatient Plan of Care  Goal: Plan of Care Review  Plan of care reviewed with pt. Pt voiced understanding. Pt AAOx4. Pt denies any c/o pain/discomfort during the shift. No apparent distress noted. Fall precautions maintained. Bed in lowest position, and locked. Call light within reach and advised to call for assistance. Side rails x 2 and slip resistant socks on at this time. Pt had dialysis today, 2 1/2 liters taken off. Blood pressure closely monitored. Family at bedside, will continue to monitor.

## 2019-05-20 NOTE — PROGRESS NOTES
[facility]     Patient currently on hemodialysis for removal of uremic toxins and volume.     Patient seen and evaluated on hemodialysis, tolerating treatment, see HD flowsheet for vitals and assessments.      Ultrafiltration goal is 2-2.5L as tolerated, keep map >65      Labs have been reviewed and the dialysate bath has been adjusted.     Assessment/Plan:    -Patient seen on HD, tolerating treatment well, w/o complaints  -Patient received morning BP meds before arrival to the [facility], 's  -Hgb 7.6, continue MARIA T therapy with HD   -Phos 2, no need for binders at this time   -Corrected calcium 8.84, will continue to monitor   -Renal diet  -Strict I/Os and daily weights  -Daily phos and renal function panels.  -Will continue to follow while inpatient       JANESSA Mueller, AGNP-C  Nephrology  Pager:  [phone]

## 2019-05-21 NOTE — ASSESSMENT & PLAN NOTE
BP Readings from Last 3 Encounters:   05/21/19 126/68   04/09/19 (!) 173/109   04/05/19 (!) 155/94     BPs elevated and uncontrolled. Maxed on all meds but imdur  Continue verapamil, irbesartan, clonidine, hydralazine, and carvedilol  imdur increased to 90 mg   Cont prn labetalol and hydralazine.

## 2019-05-21 NOTE — PLAN OF CARE
Referral sent to [facility] via [facility] after discussing with Val Mina.  SW will continue to F/U.      Tova Valverde LMSW  Ext [phone]

## 2019-05-21 NOTE — PLAN OF CARE
Problem: Adult Inpatient Plan of Care  Goal: Plan of Care Review  Outcome: Ongoing (interventions implemented as appropriate)  Patient is AAO x4. POC reviewed with patient. Patient verbalized understanding. Questions answered and encouraged. Patient's breathing is unlabored with equal chest expansion. Patient has generalized weakness,but is able to move all extremities well. Patient has a left foreram AV Fistula, which has a thrill and bruit present. Patient goes to dialysis MWF. Patient had 2 1/2 L removed on 5/20/19. Patient's BP remains an issue. Measures are being taken to reduce BP. Patient slept well through the night. Bed in lowest position, side rails up x2, call bell within reach, no complaints or signs of distress noted. WCTM.

## 2019-05-21 NOTE — HOSPITAL COURSE
The patient was admitted to [facility] for excision of L calvarial lesion and cranioplasty with clot evaculation on 4/22. She was continued on vimpat and keppra. She is to receive an additional 500 mg dose of keppra after HD on HD days. The patient had issues with thrombocytopenia post operatively. She has had issues with chronic thrombocytopenia but due to her new neurosurgery, she was transfused platelets to maintain a goal of > 60. She was stepped down to medicine eventually. Most of her issues post operatively were related to her BP and platelets. Heme onc was consulted to assist with her thrombocytopenia and eventually she was given dexamethasone 40 mg daily x 4 days and her platelets did respond. Initially the thought was that she may need radiation or splenic embolization if this fails but the need was negated as her platelets responded. Neurosurgery final recs was for platelet goal > 40k. Her BP continued to be challenging and this was discussed with cardiology and with nephrology. Cardiology recommended that nephrology manage her BPs as she is ESRD. Nephrology stated that the patient suffers with poorly controlled BP and will try to see if there are any other options but for now to continue medical management. Imdur was added to her regimen but she still maintained elevated but entirely asymptomatic. She will follow up with NS as scheduled. She worked with PTOT and was recommended for rehab. She was accepted to [facility].

## 2019-05-21 NOTE — CONSULTS
Inpatient consult to Physical Medicine Rehab  Consult performed by: Odessa Murrell NP  Consult ordered by: Zeenat Almanza MD  Reason for consult: assess rehab needs      Recommended IRF on 5/1/19 pending medical stability.  Per chart review and family request, [facility] IRF pending.       JANESSA Ovalles, FNP-C  Physical Medicine & Rehabilitation   05/21/2019

## 2019-05-21 NOTE — ASSESSMENT & PLAN NOTE
Lab Results   Component Value Date    CALCIUM 7.3 (L) 05/21/2019    PHOS 2.0 (L) 05/21/2019     HD as scheduled  Ca to adjusted in dialysate bath   Hold calcitriol  Hold Ca carbonate

## 2019-05-21 NOTE — ASSESSMENT & PLAN NOTE
S/P clot evacuation and resection of L temporal lesion with cranioplasty on 4/22  Neurosurgery following  Patient neurologically stable on exam  Staples removed without complication. Patient tolerated removal well. OK to get incision wet.  Continue Keppra and Vimpat for seizure prevention  Maintain SBP < 160  Platelet goal of > 40 K per Dr. Powell  Please continue to hold any anticoagulation or DVT prophylaxis   Follow up with Dr. Powell in 2 weeks with head CT. NSGY will schedule this appt.    Per NS, an additional dose of keppra 500 mg to be given after HD on HD days  PTOT    Awaiting transfer to [facility]

## 2019-05-21 NOTE — ASSESSMENT & PLAN NOTE
"Lab Results   Component Value Date    PLT 92 (L) 05/21/2019     Platelets improving and holding steady  No signs of bleeding.   likely secondary to splenic sequestration  HIT negative.   Hematology consulted:  1. if thrombocytopenia worsens, management could include splenic artery embolization vs. Radiation vs. Splenectomy  2. lack of plt response to transfusions is likely secondary to alloimunization  3. can transfuse for bleeding with plts less than 50 K or for lower treshold (such as 20-30K) even without bleeding    4. discussed her case with Dr. Powell; her platelet count is adequate and does not need to be raised.    Heme onc update given drop in platelets as of 5/13:  If platelets continue to drop, would first discuss with the Neurosurgical Service to determine at what level they feel that an intervention is needed to raise her platelet count.  If they feel that her platelet needs to be raised, would consider steroids initially (dexamethasone 40 mg QD x 4 days) and also interventions to reduce her splenic sequestration sich as splenic embolization or splenic XRT. Splenectomy is probably not a very good option at this point.    5/14: Case discussed with NS, tuan baxter and IR. Platelet goal of >40K. Will continue with dexamethasone x 4 days with platelet transfusion to be given today, 5/14. Should this fail, then will plan for IR embolization most likely 5/20 if patient in agreement.     5/15: Platelets responded to 47K after transfusion. Patient stated to me that she "has to do what needs to be done" if embolization is needed. Will continue to monitor platelet response on dex until last dose scheduled to end on 5/17.    5/16: Platelets improved with above management. Cont to monitor.     5/17: Last dose of Dexamethasone due today. Platelets cont to improve.     5/18: completed treatment of steroids and platelets slightly trending down. Cont to monitor.     5/19 & 20: improved; no need for splenic embolization; ok " to dc from heme standpoint

## 2019-05-21 NOTE — CONSULTS
Inpatient consult to Physical Medicine Rehab  Consult performed by: Odessa Murrell NP  Consult ordered by: Zeenat Almanza MD  Reason for consult: assess rehab needs        Please see previous sign on note from 5/21/19.     JANESSA Ovalles, FNP-C  Physical Medicine & Rehabilitation   05/21/2019  Spectralink: [phone]

## 2019-05-21 NOTE — PLAN OF CARE
Future Appointments   Date Time Provider Department Center   5/28/2019 12:00 PM [facility] OIC-CT1 500 LB LIMIT [facility]   5/28/2019  1:30 PM Jose Luis Powell MD [facility] NEUROS [address]   5/30/2019 10:45 AM LAB, HEMONC SAME DAY [facility]   6/6/2019 10:45 AM LAB, HEMONC SAME DAY [facility]   6/11/2019 10:00 AM [facility] OIC-CT2 500 LB LIMIT [facility]   6/13/2019 10:00 AM Rob Wayne MD [facility] [place] [facility]   6/13/2019 11:00 AM LAB, HEMON SAME DAY [facility] [address]          05/21/19 1605   Final Note   Assessment Type Final Discharge Note   Anticipated Discharge Disposition Rehab   What phone number can be called within the next 1-3 days to see how you are doing after discharge? [phone]   Hospital Follow Up  Appt(s) scheduled? Yes   Right Care Referral Info   Post Acute Recommendation SNF / Sub-Acute Rehab   Facility Name [facility]

## 2019-05-21 NOTE — NURSING
Pt left in w/c for transport to [facility]. Mother and nephew are with her. No concerns voiced packet sent with her and d/c teaching completed.

## 2019-05-21 NOTE — PLAN OF CARE
Problem: Occupational Therapy Goal  Goal: Occupational Therapy Goal  Goals to be met by: 5/23    Patient will increase functional independence with ADLs by performing:    Pt will follow 95% of simple step commands for functional tasks. Met for ADL/automatic task  Pt will verbally ID 3/3 items for ADL task with added time. Not met  UE Dressing with Set-up Assistance and Supervision. Met while seated  LE Dressing (pants, brief) with Set-up Assistance and CGA. Progressing. Min (A)  Grooming while standing with Set-up Assistance and Contact Guard Assistance. MET  Functional mobility at short household distance for ADL task CGA and no AD. Not met         Outcome: Ongoing (interventions implemented as appropriate)  Progressing well towards goals. Aphasia and endurance cont to be main functional and safety concerns at this time. RENA Horta 5/21/2019

## 2019-05-21 NOTE — DISCHARGE SUMMARY
[facility] Hospital Medicine  Discharge Summary      Patient Name: Holly Patel  MRN: [id number]  Admission Date: 4/22/2019  Hospital Length of Stay: 29 days  Discharge Date and Time:  05/21/2019 1:39 PM  Attending Physician: Zeenat Almanza MD   Discharging Provider: Zeenat Almanza MD  Primary Care Provider: Stan Sosa MD  Hospital Medicine Team: [facility]OS[facility] MED G Zeenat Almanza MD    HPI:   29 yo F w/ PMH significant for liver transplant in 1991, recent thyroidectomy on 3/27/2019, ESRD on HD (MWF), and epilepsy who presents to [facility] for higher level of care following planned surgical excision of L calvarial lesion s/p excision and cranioplasty 4/22/19. The pt presented to [facility] on 03/12/2019 with a complaint of headaches, among other symptoms, and received a workup that included a CT head which showed a left temporal calvarium lesion with mass effect. At that time she shared that she continued to have a headache, noting the pain was usually over the left temporal aspect of her head but migrated over to the right temporal aspect. She states the pain on the right is exacerbated with palpation of the area. She has no additional symptomatic complaints at this time. Currently stable following surgery. Denies acute pain, otherwise comfortable w/ non-focal neurological exam.      Procedure(s) (LRB):  CRANIOTOMY-- left crani for clot evac with microscrope (Left)      Hospital Course:   The patient was admitted to [facility] for excision of L calvarial lesion and cranioplasty with clot evaculation on 4/22. She was continued on vimpat and keppra. She is to receive an additional 500 mg dose of keppra after HD on HD days. The patient had issues with thrombocytopenia post operatively. She has had issues with chronic thrombocytopenia but due to her new neurosurgery, she was transfused platelets to maintain a goal of > 60. She was stepped down to medicine eventually. Most of her issues post operatively  were related to her BP and platelets. Heme onc was consulted to assist with her thrombocytopenia and eventually she was given dexamethasone 40 mg daily x 4 days and her platelets did respond. Initially the thought was that she may need radiation or splenic embolization if this fails but the need was negated as her platelets responded. Neurosurgery final recs was for platelet goal > 40k. Her BP continued to be challenging and this was discussed with cardiology and with nephrology. Cardiology recommended that nephrology manage her BPs as she is ESRD. Nephrology stated that the patient suffers with poorly controlled BP and will try to see if there are any other options but for now to continue medical management. Imdur was added to her regimen but she still maintained elevated but entirely asymptomatic. She will follow up with NS as scheduled. She worked with PTOT and was recommended for rehab. She was accepted to [facility].      Consults:   Consults (From admission, onward)        Status Ordering Provider     Inpatient consult to Hematology  Once     Provider:  (Not yet assigned)    Completed LASHON GILLIAM     Inpatient consult to Hepatology  Once     Provider:  (Not yet assigned)    Completed NICK MAYO     Inpatient consult to Interventional Radiology  Once     Provider:  (Not yet assigned)    Completed YULISA GILLIAM     Inpatient consult to Midline team  Once     Provider:  (Not yet assigned)    Completed DEMETRIUS STINSON     Inpatient consult to Midline team  Once     Provider:  (Not yet assigned)    Completed CATHI REYNA     Inpatient consult to Nephrology  Once     Provider:  (Not yet assigned)    Completed NICK MAYO     Inpatient consult to Physical Medicine Rehab  Once     Provider:  (Not yet assigned)    Completed DYLON SAAVEDRA     Inpatient consult to Physical Medicine Rehab  Once     Provider:  (Not yet assigned)    Completed YULISA GILLIAM     Inpatient  consult to Physical Medicine Rehab  Once     Provider:  (Not yet assigned)    Completed YULISA GILLIAM     Inpatient consult to PICC team ([facility])  Once     Provider:  (Not yet assigned)    Completed LASHON GILLIAM     Inpatient consult to PICC team ([facility])  Once     Provider:  (Not yet assigned)    Completed YULISA GILLIAM.          * Nontraumatic subcortical hemorrhage of left cerebral hemisphere  S/P clot evacuation and resection of L temporal lesion with cranioplasty on 4/22  Neurosurgery following  Patient neurologically stable on exam  Staples removed without complication. Patient tolerated removal well. OK to get incision wet.  Continue Keppra and Vimpat for seizure prevention  Maintain SBP < 160  Platelet goal of > 40 K per Dr. Powell  Please continue to hold any anticoagulation or DVT prophylaxis   Follow up with Dr. Powell in 2 weeks with head CT. NSGY will schedule this appt.    Per NS, an additional dose of keppra 500 mg to be given after HD on HD days  PTOT    [facility]    Brain tumor  Per above       Hypocalcemia  Lab Results   Component Value Date    CALCIUM 7.3 (L) 05/21/2019    PHOS 2.0 (L) 05/21/2019     HD as scheduled  Ca to adjusted in dialysate bath   Hold calcitriol  Hold Ca carbonate     Thrombocytopenia  Lab Results   Component Value Date    PLT 92 (L) 05/21/2019     Platelets improving and holding steady  No signs of bleeding.   likely secondary to splenic sequestration  HIT negative.   Hematology consulted:  1. if thrombocytopenia worsens, management could include splenic artery embolization vs. Radiation vs. Splenectomy  2. lack of plt response to transfusions is likely secondary to alloimunization  3. can transfuse for bleeding with plts less than 50 K or for lower treshold (such as 20-30K) even without bleeding    4. discussed her case with Dr. Powell; her platelet count is adequate and does not need to be raised.    Heme onc update given drop in platelets as of  "5/13:  If platelets continue to drop, would first discuss with the Neurosurgical Service to determine at what level they feel that an intervention is needed to raise her platelet count.  If they feel that her platelet needs to be raised, would consider steroids initially (dexamethasone 40 mg QD x 4 days) and also interventions to reduce her splenic sequestration sich as splenic embolization or splenic XRT. Splenectomy is probably not a very good option at this point.    5/14: Case discussed with NS, heme onc and IR. Platelet goal of >40K. Will continue with dexamethasone x 4 days with platelet transfusion to be given today, 5/14. Should this fail, then will plan for IR embolization most likely 5/20 if patient in agreement.     5/15: Platelets responded to 47K after transfusion. Patient stated to me that she "has to do what needs to be done" if embolization is needed. Will continue to monitor platelet response on dex until last dose scheduled to end on 5/17.    5/16: Platelets improved with above management. Cont to monitor.     5/17: Last dose of Dexamethasone due today. Platelets cont to improve.     5/18: completed treatment of steroids and platelets slightly trending down. Cont to monitor.     5/19 & 20: improved; no need for splenic embolization; ok to dc from heme standpoint          Seizures  Cont home meds of vimpat and keppra    Moderate protein-calorie malnutrition  Cont current diet  Encourage oral intake    Immunosuppressed  Per liver transplant       Renovascular hypertension  BP Readings from Last 3 Encounters:   05/21/19 126/68   04/09/19 (!) 173/109   04/05/19 (!) 155/94     BPs elevated and uncontrolled. Maxed on all meds but imdur  Continue verapamil, irbesartan, clonidine, hydralazine, and carvedilol  imdur increased to 90 mg   Cont prn labetalol and hydralazine.     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  Appreciate hepatology consult   Continue Tacrolimus 4 mg PO in AM and 4 mg PO in PM      Final Active Diagnoses:    Diagnosis Date Noted POA    PRINCIPAL PROBLEM:  Nontraumatic subcortical hemorrhage of left cerebral hemisphere [I61.0]  Yes    Brain tumor [D49.6] 04/22/2019 Yes    Hypocalcemia [E83.51] 03/28/2019 Yes    Thrombocytopenia [D69.6] 05/16/2018 Yes    Seizures [R56.9]  Yes    Moderate protein-calorie malnutrition [E44.0] 08/16/2017 Yes    Immunosuppressed [D89.9] 08/05/2017 Yes     Chronic    Renovascular hypertension [I15.0] 10/02/2015 Yes     Chronic    ESRD on hemodialysis [N18.6, Z99.2] 09/30/2015 Not Applicable     Chronic    Liver replaced by transplant [Z94.4] 09/10/2012 Not Applicable     Chronic      Problems Resolved During this Admission:    Diagnosis Date Noted Date Resolved POA    Chest pain [R07.9] 05/12/2019 05/13/2019 No    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 05/01/2019 Yes    Cytotoxic brain edema [G93.6]  05/05/2019 Yes    Brain lesion [G93.9] 03/12/2019 05/05/2019 Yes       Discharged Condition: good    Disposition: Rehab Facility    Follow Up:    Patient Instructions:      Ambulatory Referral to Hematology / Oncology   Referral Priority: Routine Referral Type: Consultation   Referral Reason: Specialty Services Required   Requested Specialty: Hematology and Oncology   Number of Visits Requested: 1       Significant Diagnostic Studies: see imaging    Pending Diagnostic Studies:     Procedure Component Value Units Date/Time    Calcium, ionized [[id number]] Collected:  04/26/19 1807    Order Status:  Sent Lab Status:  In process Updated:  04/26/19 1808    Specimen:  Blood          Medications:  Reconciled Home Medications:      Medication List      START taking these medications    bacitracin 500 unit/gram ointment  Apply topically as needed.     bisacodyl 10 mg Supp  Commonly known as:  DULCOLAX  Place 1 suppository (10 mg total) rectally once daily.      epoetin anca-epbx 3,000 unit/mL injection  Commonly known as:  RETACRIT  Inject 1 mL (3,000 Units total) into the skin every Mon, Wed, Fri.     isosorbide mononitrate 30 MG 24 hr tablet  Commonly known as:  IMDUR  Take 3 tablets (90 mg total) by mouth once daily.     ondansetron 4 MG Tbdl  Commonly known as:  ZOFRAN-ODT  Take 1 tablet (4 mg total) by mouth every 6 (six) hours as needed.     oxyCODONE 5 MG immediate release tablet  Commonly known as:  ROXICODONE  Take 1 tablet (5 mg total) by mouth every 6 (six) hours as needed.     pantoprazole 40 MG tablet  Commonly known as:  PROTONIX  Take 1 tablet (40 mg total) by mouth once daily.  Start taking on:  5/22/2019     polyethylene glycol 17 gram Pwpk  Commonly known as:  GLYCOLAX  Take 17 g by mouth 2 (two) times daily.     senna-docusate 8.6-50 mg 8.6-50 mg per tablet  Commonly known as:  PERICOLACE  Take 2 tablets by mouth once daily.        CHANGE how you take these medications    cloNIDine 0.3 MG tablet  Commonly known as:  CATAPRES  Take 1 tablet (0.3 mg total) by mouth every 8 (eight) hours.  What changed:    · medication strength  · how much to take  · when to take this     * levETIRAcetam 500 MG Tab  Commonly known as:  KEPPRA  Take 1 tablet (500 mg total) by mouth 2 (two) times daily.  What changed:  Another medication with the same name was added. Make sure you understand how and when to take each.     * levETIRAcetam 500 MG Tab  Commonly known as:  KEPPRA  Take 1 tablet (500 mg total) by mouth every Mon, Wed, Fri.  What changed:  You were already taking a medication with the same name, and this prescription was added. Make sure you understand how and when to take each.     tacrolimus 5 MG Cap  Commonly known as:  PROGRAF  Take 1 capsule (5 mg total) by mouth once daily.  What changed:    · medication strength  · how much to take  · when to take this         * This list has 2 medication(s) that are the same as other medications prescribed for you. Read  the directions carefully, and ask your doctor or other care provider to review them with you.            CONTINUE taking these medications    carvedilol 25 MG tablet  Commonly known as:  COREG  Take 1 tablet (25 mg total) by mouth 2 (two) times daily.     hydrALAZINE 100 MG tablet  Commonly known as:  APRESOLINE  Take 1 tablet (100 mg total) by mouth every 8 (eight) hours.     irbesartan 300 MG tablet  Commonly known as:  AVAPRO  Take 1 tablet (300 mg total) by mouth every morning.     lacosamide 50 mg Tab  Commonly known as:  VIMPAT  Take 1 tablet (50 mg total) by mouth 2 (two) times daily.     verapamil 240 MG CR tablet  Commonly known as:  CALAN-SR  Take 1 tablet (240 mg total) by mouth every evening.        STOP taking these medications    calcitRIOL 0.5 MCG Cap  Commonly known as:  ROCALTROL     calcium carbonate 500 mg calcium (1,250 mg) tablet  Commonly known as:  OS-WOLFGANG     dicyclomine 20 mg tablet  Commonly known as:  BENTYL     ergocalciferol 50,000 unit Cap  Commonly known as:  ERGOCALCIFEROL     famotidine 40 MG tablet  Commonly known as:  PEPCID     triamcinolone acetonide 0.1% 0.1 % ointment  Commonly known as:  KENALOG            Indwelling Lines/Drains at time of discharge:   Lines/Drains/Airways     Drain                 Hemodialysis AV Fistula Left forearm -- days                  Zeenat Almanza MD  Department of Hospital Medicine  [facility]

## 2019-05-21 NOTE — PT/OT/SLP PROGRESS
"Occupational Therapy   Treatment    Name: Holly Patel  MRN: [id number]  Admitting Diagnosis:  Nontraumatic subcortical hemorrhage of left cerebral hemisphere  29 Days Post-Op    Recommendations:     Discharge Recommendations: rehabilitation facility  Discharge Equipment Recommendations:  none  Barriers to discharge:  (aphasia; endurance)    Assessment:     Holly Patel is a 28 y.o. female with a medical diagnosis of Nontraumatic subcortical hemorrhage of left cerebral hemisphere.  She presents with safety concerns for return home with children at this time 2/2 impaired endurance from baseline; moreover, pt with significant communication barriers affecting her functional safety and return to roles and routines. She is an excellent rehab candidate at this time and has good family support. Performance deficits affecting function are impaired endurance, impaired self care skills, impaired functional mobilty, gait instability, impaired balance, impaired cognition, impaired cardiopulmonary response to activity, other (comment)(aphasia).     Rehab Prognosis:  Good; patient would benefit from acute skilled OT services to address these deficits and reach maximum level of function.       Plan:     Patient to be seen 3 x/week to address the above listed problems via self-care/home management, therapeutic activities, therapeutic exercises, neuromuscular re-education, cognitive retraining  · Plan of Care Expires: 05/25/19  · Plan of Care Reviewed with: patient, mother    Subjective   Pt reported "I know it - I just can't get my words out. I was waiting for you yesterday"  Pain/Comfort:  · Pain Rating 1: 0/10("I'm just depressed")  · Pain Rating Post-Intervention 1: 0/10    Objective:     Communicated with: RN (Marva) prior to session.  Patient found HOB elevated with telemetry upon OT entry to room.    General Precautions: Standard, aphasia, aspiration, fall, seizure   Orthopedic Precautions:N/A   Braces: N/A "     Occupational Performance:   Bed Mobility:    · Patient completed Supine to Sit with supervision     Functional Mobility/Transfers:  · Patient completed Sit <> Stand Transfer with supervision  with  rolling walker   · Patient completed Bed <> Chair Transfer using Step Transfer technique with supervision with rolling walker  · Functional Mobility: SBA for household and community distances with rolling walker  · Requiring 2 standing rest breaks  · Completed cognitive task with mobility     Activities of Daily Living:  · Feeding:  set up    · Grooming: modified independence and and set up; to wash face while standing at sink. requiring unilateral UE support for endurance mgmt     · Upper Body Dressing: supervision and set up in seated and standing to don personal robe with tie closure  · Lower Body Dressing: stand by assistance EOB to don B socks with set up; modified completion of tasks    [facility] 6 Click ADL: 21    Treatment & Education:  -Pt alert and oriented x3; agreeable to therapy session  -Following 100% of simple step commands for functional task completion  -ID 3/4 colors upon presentation  -ID 0/5 pictures in hallway with 0 cues; 1/5 with moderate cues on 2nd trial  -Max difficulty with reading signage in hallway requiring max cues for completion   -able to locate room with 0 added cues  -Communication board updated; questions/concerns addressed within OT scope of practice    Patient left seated EOB with all lines intact, call button in reach and family presentEducation:      GOALS:   Multidisciplinary Problems     Occupational Therapy Goals        Problem: Occupational Therapy Goal    Goal Priority Disciplines Outcome Interventions   Occupational Therapy Goal     OT, PT/OT Ongoing (interventions implemented as appropriate)    Description:  Goals to be met by: 5/23    Patient will increase functional independence with ADLs by performing:    Pt will follow 95% of simple step commands for functional tasks.  Met for ADL/automatic task  Pt will verbally ID 3/3 items for ADL task with added time. Not met  UE Dressing with Set-up Assistance and Supervision. Met while seated  LE Dressing (pants, brief) with Set-up Assistance and CGA. Progressing. Min (A)  Grooming while standing with Set-up Assistance and Contact Guard Assistance. MET  Functional mobility at short household distance for ADL task CGA and no AD. Not met                           Time Tracking:     OT Date of Treatment: 05/21/19  OT Start Time: 0857  OT Stop Time: 0912  OT Total Time (min): 15 min    Billable Minutes:Self Care/Home Management 15    RENA Horta  5/21/2019

## 2019-05-21 NOTE — PLAN OF CARE
Patient was accepted to [facility], as per Jaison.  (SW) informed Patient's nurse that they can call report to [phone]. SW arranged Wheelchair transport via [facility]. Requested pick-up time 3:33PM. Requested pick-up time does not guarantee arrival time. SW sent Transport pack to 7th floor [facility].         05/21/19 1429   Post-Acute Status   Post-Acute Authorization Placement   Post-Acute Placement Status Set-up Complete   Karla Kaplan LMSW  [facility]  [phone]

## 2019-05-22 NOTE — PT/OT/SLP DISCHARGE
Occupational Therapy Discharge Summary    Holly Patel  MRN: [id number]   Principal Problem: Nontraumatic subcortical hemorrhage of left cerebral hemisphere      Patient Discharged from acute Occupational Therapy on 5/21/2019.  Please refer to prior OT note dated 5/21/2019 for functional status.    Assessment:      Goals partially met.    Objective:     GOALS:   Multidisciplinary Problems     Occupational Therapy Goals     Not on file          Multidisciplinary Problems (Resolved)        Problem: Occupational Therapy Goal    Goal Priority Disciplines Outcome Interventions   Occupational Therapy Goal   (Resolved)     OT, PT/OT Outcome(s) achieved    Description:  Goals to be met by: 5/23    Patient will increase functional independence with ADLs by performing:    Pt will follow 95% of simple step commands for functional tasks. Met for ADL/automatic task  Pt will verbally ID 3/3 items for ADL task with added time. Not met  UE Dressing with Set-up Assistance and Supervision. Met while seated  LE Dressing (pants, brief) with Set-up Assistance and CGA. Progressing. Min (A)  Grooming while standing with Set-up Assistance and Contact Guard Assistance. MET  Functional mobility at short household distance for ADL task CGA and no AD. Not met                           Reasons for Discontinuation of Therapy Services  Transfer to alternate level of care.      Plan:     Patient Discharged to: Inpatient Rehab    RENA Horta  5/22/2019

## 2019-05-23 PROBLEM — Z75.8 DISCHARGE PLANNING ISSUES: Status: ACTIVE | Noted: 2019-01-01

## 2019-05-23 NOTE — ED PROVIDER NOTES
Encounter Date: 5/23/2019    SCRIBE #1 NOTE: I, Martina Montanez, am scribing for, and in the presence of,  Dr. Etienne. I have scribed the following portions of the note - the APC attestation and the EKG reading.       History     Chief Complaint   Patient presents with    Hypertension     Pt coming from [facility] for evaluation of hypertension. Pt had initial pressure 182/115. Pt has been given morning P.O. antihypertensive medications.     Ms Patel is a 29 yo female patient with PMHx of liver transplant in 1991, recent thyroidectomy on 3/27/2019, ESRD on HD (MWF), and epilepsy who presents to [facility] for higher level of care following planned surgical excision of L calvarial lesion s/p excision and cranioplasty 4/22/19 that presents to the ED from [facility] with headache, blurred vision and hypertension. Denies any trauma or injury.  Onset of symptoms was last night. Pt localizes pain to left side of head and rates pain 10/10. Reports associated blurred vision. Denies any nausea, vomiting, light-headedness, dizziness, fevers, chills, neck pain, neck stiffness.         Review of patient's allergies indicates:   Allergen Reactions    Chloral hydrate Hallucinations     Other reaction(s): Hallucinations  Other reaction(s): Hives    Hydrocodone Other (See Comments)     Mental status changes    Tolerates oxycodone     Past Medical History:   Diagnosis Date    Anemia in ESRD (end-stage renal disease) 10/12/2015    dialysis tues, thursday, sat; access left arm    Chronic rejection of liver transplant 3/22/2016    Depression     Encounter for blood transfusion     ESRD on hemodialysis 9/30/2015    History of recent hospitalization 05/2018    pneumonia    History of splenomegaly 4/12/2016    Immunosuppressed 8/5/2017    Iron deficiency anemia secondary to inadequate dietary iron intake 8/16/2017    She [facility]   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     hemangioendothelioma s/p LTx ()    Moderate protein-calorie malnutrition 2017    MRSA bacteremia 2017    Pneumonia     Prophylactic immunotherapy 2014    Renovascular hypertension 10/2/2015    Secondary hyperparathyroidism 2017    Seizures     Sialadenitis 3/21/2018    Thrombocytopenia 2016     Past Surgical History:   Procedure Laterality Date    BIOPSY, LIVER, TRANSJUGULAR APPROACH N/A 2018    Performed by [facility] at [facility] OR 2ND FLR    BIOPSY-LIVER N/A 2017    Performed by [facility] at [facility] OR 2ND FLR    BIOPSY-LIVER N/A 3/22/2016    Performed by [facility] at [facility] OR 2ND FLR     SECTION      x 2    CONIZATION-CERVICAL-LEEP N/A 6/15/2018    Performed by Neelam Marroquin MD at [facility] OR    [id number]-YIDDTEP-ND; upper extremity Left 2015    Performed by Idalia Diaz MD at [facility] OR 2ND FLR    CRANIOPLASTY  2019    Performed by Jose Luis Powell MD at [facility] OR 2ND FLR    CRANIOTOMY-- left crani for clot evac with microscrope Left 2019    Performed by Jose Luis Powell MD at [facility] OR 2ND FLR    EMBOLIZATION, BLOOD VESSEL N/A 2018    Performed by Aren Ramos MD at [facility] CATH LAB    Exam Under Anesthesia N/A 2018    Performed by [facility]am [facility]roquin MD at [facility] OR 2ND FLR    Exam under anesthesia N/A 2018    Performed by Ne[facility]am [facility]roquin MD at [facility] OR    Exam under anesthesia (ADD ON ) N/A 2018    Performed by NICKIE Alvarez MD at [facility] OR    Exam under anesthesia -cervical suturing  N/A 2018    Performed by Lei Sims III, MD at [facility] OR    EXCISION, NEOPLASM, SKULL with Cranioplasty Left 2019    Performed by Jose [facility] Powell MD at [facility] OR 2ND FLR    FISTULOGRAM Left 2015    Performed by Idalia Diaz MD at [facility] CATH LAB    LIVER BIOPSY      LIVER TRANSPLANT  1992    PARATHYROIDECTOMY, Minimally Invasive Bilateral Exploration  Bilateral 3/27/2019    Performed by [facility] at [facility] OR 2ND FLR    SUTURE REPAIR,CERVIX  6/19/2018    Performed by [facility] at [facility] OR    TUBAL LIGATION  2010     Family History   Problem Relation Age of Onset    Hypertension Mother     Hypertension Father     Cancer Sister     Heart attack Maternal Uncle     Melanoma Neg Hx     Breast cancer Neg Hx     Colon cancer Neg Hx     Ovarian cancer Neg Hx      Social History     Tobacco Use    Smoking status: Never Smoker    Smokeless tobacco: Never Used   Substance Use Topics    Alcohol use: No    Drug use: No     Review of Systems   Constitutional: Negative for chills and fever.   HENT: Negative for congestion, rhinorrhea and sore throat.    Eyes: Positive for visual disturbance. Negative for pain and redness.   Respiratory: Negative for cough and shortness of breath.    Cardiovascular: Negative for chest pain and leg swelling.   Gastrointestinal: Negative for abdominal pain, diarrhea, nausea and vomiting.   Genitourinary: Negative for dysuria, flank pain and frequency.   Musculoskeletal: Negative for back pain, neck pain and neck stiffness.   Skin: Negative for rash.   Allergic/Immunologic: Positive for immunocompromised state.   Neurological: Positive for headaches. Negative for dizziness, syncope, facial asymmetry, speech difficulty, weakness, light-headedness and numbness.   Psychiatric/Behavioral: Negative for confusion.       Physical Exam     Initial Vitals [05/23/19 1007]   BP Pulse Resp Temp SpO2   (!) 175/110 70 16 98.1 °F (36.7 °C) 99 %      MAP       --         Physical Exam    Constitutional: She appears well-developed and well-nourished. She is cooperative.  Non-toxic appearance. No distress.   HENT:   Head: Normocephalic and atraumatic.   Eyes: Conjunctivae and EOM are normal. Pupils are equal, round, and reactive to light.   Neck: Normal range of motion. Neck supple. No spinous process tenderness and no muscular  tenderness present. Normal range of motion present. No neck rigidity. No JVD present.   Cardiovascular: Normal rate. Exam reveals no gallop, no S4 and no friction rub.    No murmur heard.  Pulmonary/Chest: Effort normal. No respiratory distress. She has no decreased breath sounds. She has no wheezes. She has no rhonchi. She has no rales.   Abdominal: Soft. There is no tenderness. There is no rigidity, no rebound, no guarding and no CVA tenderness.   Musculoskeletal: Normal range of motion.   Neurological: She is alert and oriented to person, place, and time. She has normal strength. No cranial nerve deficit or sensory deficit. Coordination normal.   5/5 strength BUE and BLE         ED Course   Procedures  Labs Reviewed   CBC W/ AUTO DIFFERENTIAL - Abnormal; Notable for the following components:       Result Value    WBC 2.39 (*)     RBC 3.01 (*)     Hemoglobin 8.1 (*)     Hematocrit 25.2 (*)     Mean Corpuscular Hemoglobin 26.9 (*)     RDW 17.8 (*)     Platelets 89 (*)     Immature Granulocytes 1.3 (*)     Gran # (ANC) 1.4 (*)     Lymph # 0.6 (*)     All other components within normal limits   COMPREHENSIVE METABOLIC PANEL - Abnormal; Notable for the following components:    Glucose 126 (*)     BUN, Bld 24 (*)     Creatinine 4.6 (*)     Calcium 7.6 (*)     Albumin 3.0 (*)     Alkaline Phosphatase 497 (*)     eGFR if  14.0 (*)     eGFR if non  12.1 (*)     All other components within normal limits   PROTIME-INR   URINALYSIS, REFLEX TO URINE CULTURE   TACROLIMUS LEVEL   POCT URINE PREGNANCY     EKG Readings: (Independently Interpreted)   Rhythm: Normal Sinus Rhythm. Heart Rate: 89.    LVH with repolarizations on v1-v5. T wave inversion in v6 and lead 1. Similar when compared to March 12, 2019.        ECG Results          EKG 12-lead (Final result)  Result time 05/23/19 14:37:01    Final result by Interface, Lab In [facility] (05/23/19 14:37:01)                 Narrative:    Test Reason :  R51,    Vent. Rate : 089 BPM     Atrial Rate : 093 BPM     P-R Int : 152 ms          QRS Dur : 088 ms      QT Int : 392 ms       P-R-T Axes : 048 060 093 degrees     QTc Int : 477 ms      Normal sinus rhythm  Possible Left atrial enlargement  LVH with repolarization abnormality  Abnormal ECG  When compared with ECG of 12-MAY-2019 15:35,  QT has lengthened  Confirmed by JI SHEA MD ([id number]) on 5/23/2019 2:36:47 PM    Referred By: [email or web address]   SELF           Confirmed By:JI SHEA MD                            Imaging Results          CT Head Without Contrast (Final result)  Result time 05/23/19 12:43:47    Final result by Reggie Espinoza DO (05/23/19 12:43:47)                 Impression:      Evolving operative change from left temporal craniotomy/cranioplasty with resolution of previous parenchymal hemorrhage about the posterior temporal resection cavity.    There is no evidence for new hemorrhage or significant new abnormal parenchymal attenuation.  Clinical correlation and further evaluation as warranted.      Electronically signed by: Reggie Espinoza DO  Date:    05/23/2019  Time:    12:43             Narrative:    EXAMINATION:  CT HEAD WITHOUT CONTRAST    CLINICAL HISTORY:  Headache, blurred vision. Craniotomy and clot evacuation last month;    TECHNIQUE:  Multiple sequential 5 mm axial images of the head without contrast.  Coronal and sagittal reformatted imaging from the axial acquisition.    COMPARISON:  04/30/2019    FINDINGS:  Evolving operative change from left temporal craniectomy/cranioplasty for left posterior temporal hematoma evacuation.  There is evolving encephalomalacia in the left posterior temporal lobe with resolution of previous scattered hyperdensities compatible with prior hemorrhage.  There is no evidence for acute intracranial hemorrhage or significant new abnormal parenchymal attenuation.  Ventricles relatively stable without hydrocephalus.  Visualized paranasal sinuses and mastoid air  cells are clear.                                 Medical Decision Making:   History:   Old Medical Records: I decided to obtain old medical records.  Initial Assessment:   Ms Patel is a 27 yo female patient with PMHx of liver transplant in 1991, recent thyroidectomy on 3/27/2019, ESRD on HD (MWF), and epilepsy who presents to [facility] for higher level of care following planned surgical excision of L calvarial lesion s/p excision and cranioplasty 4/22/19 that presents to the ED from [facility] with headache, blurred vision and hypertension. Denies any trauma or injury.  Onset of symptoms was last night. Pt localizes pain to left side of head and rates pain 10/10. Reports associated blurred vision. Denies any nausea, vomiting, light-headedness, dizziness, fevers, chills, neck pain, neck stiffness.   Independently Interpreted Test(s):   I have ordered and independently interpreted EKG Reading(s) - see prior notes  Clinical Tests:   Lab Tests: Ordered and Reviewed  Radiological Study: Ordered and Reviewed  Medical Tests: Ordered and Reviewed  ED Management:  Pt hypertensive on arrival complaining of 10/10 left sided headache with  blurred vision. CT head, labs, EKG ordered. Pt given tylenol, reglan, benadryl, IVFs for headache. Neurosurgery consulted. Discussed patient with Neurosurgery who will evaluate patient after CT results. Pt reports relief of symptoms after meds and fluids. CT read reports there is no evidence for new hemorrhage or significant new abnormal parenchymal attenuation. Evolving operative change from left temporal craniotomy/cranioplasty with resolution of previous parenchymal hemorrhage about the posterior temporal resection cavity. Neurosurgery evaluated patient and recommend medicine admission for uncontrolled BP. BP still elevated at 175/125 after clonidine and IV hydralazine. CCU consulted and will evaluate patient for hypertensive urgency. ICU evaluated patient and cleared her for the floor.  Discussed patient with hospital medicine and patient admitted to inpatient medicine for further treatment and management.             Scribe Attestation:   Scribe #1: I performed the above scribed service and the documentation accurately describes the services I performed. I attest to the accuracy of the note.    Attending Attestation:     Physician Attestation Statement for NP/PA:   I have conducted a face to face encounter with this patient in addition to the NP/PA, due to Medical Complexity    Other NP/PA Attestation Additions:      Medical Decision Making: HPI: Ms Patel is a 27 yo female s/p left sided craniotomy on April 22nd presents with chief complaint of constant left sided HA constant since yesterday evening. Patient also complaining of blurred vision and hypertension. Reports pain when ranging eyes. Onset of symptoms was last night. Pt localizes pain to left side of head and rates pain 10/10. Reports associated blurred vision. Blood pressure was 219/34 at 6:30 AM. Patient was given Murelax, senokot, verapamil 120 mg, clonidine at 610, hydralazine 100mg, 90 mg losartan. Denies any nausea, vomiting, light-headedness, dizziness, fevers, chills, neck pain, neck stiffness.   PE: cranial nerves grossly intact. 5/5 strength in upper and lower extremities grossly intact. left temporal area surgical incision well healed with no erythema, swelling, drainage, or induration.  MDM: differential diagnosis includes intracranial bleed, subarachnoid hemorrhage, migraine HA. Blood pressure trending down with antihypertensive medications received prior to patient transfer. Will order CT head to rule out bleed. Patient is fresh post op. HA controlled wth reglan and tylenol.     CT head negative for bleed  Patient cleared by neurosurgery  Remains hypertensive despite multiple doses of antihypertensive medication received this morning given 1 more dose of 0.2 mg of clonidine in the emergency department followed by 10 mg IV of  hydralazine  Discussed with Medicine that not comfortable taking the patient a due to elevated blood pressure  Critical care was consulted, patient with elevated blood pressure similar or worse than today during previous admission, patient considered stable for admission to Medicine  Admit to Medicine    Dg   hypertensive urgency  Headache       Attending Critical Care:   Critical Care Times:   Direct Patient Care (initial evaluation, reassessments, and time considering the case)................................................................10 minutes.   Additional History from reviewing old medical records or taking additional history from the family, EMS, PCP, etc.......................5 minutes.   Ordering, Reviewing, and Interpreting Diagnostic Studies...............................................................................................................5 minutes.   Documentation..................................................................................................................................................................................5 minutes.   Consultation with other Physicians. .................................................................................................................................................5 minutes.   ==============================================================  · Total Critical Care Time - exclusive of procedural time: 30 minutes.  ==============================================================  Critical care was necessary to treat or prevent imminent or life-threatening deterioration of the following conditions: hypertensive urgency.   Critical care was time spent personally by me on the following activities: obtaining history from patient or relative, examination of patient, review of old charts, development of treatment plan with patient or relative, ordering and performing treatments and interventions, evaluation of patient's response to  treatment, discussion with consultants and re-evaluation of patient's conition.   Critical Care Condition: potentially life-threatening                  Clinical Impression:       ICD-10-CM ICD-9-CM   1. Headache R51 784.0   2. Hypertensive urgency I16.0 401.9         Disposition:   Disposition: Admitted  Condition: Fair                        Philip Reece PA-C  05/23/19 1952

## 2019-05-23 NOTE — HPI
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     Pt presents with with her mother from [facility] for evaluation of headache and uncontrollable blood pressure. Her headache started yesterday and is located over the left parietal/temporal area without radiation to other areas. It has a sharp and throbbing character with 5/10 intensity. Her headache is accompanied by visual blurriness that has been persistent since the time of her surgery one month ago. She denies fever, chills, but does report sick contact with her mother who has an upper respiratory tract infection. She has been adherent to her blood pressure medication, which includes carvedilol, clonidine, hydralazine, irbesartan and isosorbide mononitrate. Nothing seems to improve her headache or high blood pressure.     She was recently started on AED's for seizure activity she experienced prior to surgery. She has been participating in physical therapy, however had not achieved her baseline status yet.

## 2019-05-23 NOTE — ASSESSMENT & PLAN NOTE
Pt with history of ESRD on five agents for refractory hypertension. Home agents include carvedilol 25 mg BID, clonidine 0.3 mg Q8, hydralazine 100 q8, irbesartan 300 mg, ISMN 50 mg BID. Recommend resumption of home medications.     Recommendations  - admit to hospital medicine  - consult ophtho for visual disturbance  - consider renal ultrasound, renin/aldosterone ratio, urine metanephrines  - Consider addition of spironolactone   - Please do not hesitate to reach out to MICU again

## 2019-05-23 NOTE — SUBJECTIVE & OBJECTIVE
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    BIOPSY-LIVER N/A 2017    Performed by [facility]    BIOPSY-LIVER N/A 3/22/2016    Performed by [facility] at [facility] OR [address] [place] [address]     SECTION      x 2    CONIZATION-CERVICAL-LEEP N/A 6/15/2018    Performed by Neelam Marroquin [facility] at [facility] OR    [id number]; upper extremity Left 2015    Performed by [facility] at [facility]    CRANIOPLASTY  2019    Performed by [facility]    CRANIOTOMY-- left crani for clot evac with microscrope Left 2019    Per[facility] by [facility]    EMBOLIZATION, BLOOD VESSEL N/A 2018    Performed by Aren Ramos MD at [facility] CATH LAB    Exam Under Anesthesia N/A 2018    [facility]    Exam under  anesthesia N/A 6/19/2018    Performed by [facility] at [facility] OR    Exam under anesthesia (ADD ON ) N/A 7/9/2018    Performed by NICKIE Alvarez MD at [facility] OR    Exam under anesthesia -cervical suturing  N/A 7/26/2018    Performed by Lei Sims III, MD at [facility] OR    EXCISION, NEOPLASM, SKULL with Cranioplasty Left 4/22/2019    Performed by [facility] at [facility]    FISTULOGRAM Left 12/4/2015    Performed by Idalia Diaz MD at [facility] CATH LAB    LIVER BIOPSY      LIVER TRANSPLANT  09/1992    PARATHYROIDECTOMY, Minimally Invasive Bilateral Exploration Bilateral 3/27/2019    Performed by Ashley Guallpa MD at [facility]    SUTURE REPAIR,CERVIX  6/19/2018    Performed by [facility]am [facility]quin [facility] at [facility] OR    TUBAL LIGATION  2010       Review of patient's allergies indicates:   Allergen Reactions    Chloral hydrate Hallucinations     Other reaction(s): Hallucinations  Other reaction(s): Hives    Hydrocodone Other (See Comments)     Mental status changes    Tolerates oxycodone       No current facility-administered medications on file prior to encounter.      Current Outpatient Medications on File Prior to Encounter   Medication Sig    bacitracin 500 unit/gram ointment Apply topically as needed.    bisacodyl (DULCOLAX) 10 mg Supp Place 1 suppository (10 mg total) rectally once daily.    carvedilol (COREG) 25 MG tablet Take 1 tablet (25 mg total) by mouth 2 (two) times daily.    cloNIDine (CATAPRES) 0.3 MG tablet Take 1 tablet (0.3 mg total) by mouth every 8 (eight) hours.    epoetin anca-epbx (RETACRIT) 3,000 unit/mL injection Inject 1 mL (3,000 Units total) into the skin every Mon, Wed, Fri.    hydrALAZINE (APRESOLINE) 100 MG tablet Take 1 tablet (100 mg total) by mouth every 8 (eight) hours.    irbesartan (AVAPRO) 300 MG tablet Take 1 tablet (300 mg total) by mouth every morning.    isosorbide mononitrate (IMDUR) 30 MG 24 hr tablet Take 3 tablets (90 mg  total) by mouth once daily.    lacosamide (VIMPAT) 50 mg Tab Take 1 tablet (50 mg total) by mouth 2 (two) times daily.    levETIRAcetam (KEPPRA) 500 MG Tab Take 1 tablet (500 mg total) by mouth 2 (two) times daily.    levETIRAcetam (KEPPRA) 500 MG Tab Take 1 tablet (500 mg total) by mouth every Mon, Wed, Fri.    ondansetron (ZOFRAN-ODT) 4 MG TbDL Take 1 tablet (4 mg total) by mouth every 6 (six) hours as needed.    oxyCODONE (ROXICODONE) 5 MG immediate release tablet Take 1 tablet (5 mg total) by mouth every 6 (six) hours as needed.    pantoprazole (PROTONIX) 40 MG tablet Take 1 tablet (40 mg total) by mouth once daily.    polyethylene glycol (GLYCOLAX) 17 gram PwPk Take 17 g by mouth 2 (two) times daily.    senna-docusate 8.6-50 mg (PERICOLACE) 8.6-50 mg per tablet Take 2 tablets by mouth once daily.    tacrolimus (PROGRAF) 5 MG Cap Take 1 capsule (5 mg total) by mouth once daily.    verapamil (CALAN-SR) 240 MG CR tablet Take 1 tablet (240 mg total) by mouth every evening.     Family History     Problem Relation (Age of Onset)    Cancer Sister    Heart attack Maternal Uncle    Hypertension Mother, Father        Tobacco Use    Smoking status: Never Smoker    Smokeless tobacco: Never Used   Substance and Sexual Activity    Alcohol use: No    Drug use: No    Sexual activity: Never     Partners: Male     Review of Systems   Constitutional: Negative for chills, diaphoresis and fatigue.   HENT: Negative for congestion and sinus pressure.    Eyes: Positive for photophobia and visual disturbance (blurry).   Respiratory: Negative for cough, chest tightness and shortness of breath.    Cardiovascular: Negative for chest pain, palpitations and leg swelling.   Gastrointestinal: Negative for abdominal pain, constipation, diarrhea, nausea and vomiting.   Genitourinary: Positive for difficulty urinating.   Musculoskeletal: Negative for arthralgias, myalgias, neck pain and neck stiffness.   Skin: Negative for color  change, pallor and rash.   Neurological: Positive for headaches. Negative for dizziness, tremors, syncope, weakness, light-headedness and numbness.   Psychiatric/Behavioral: Negative for agitation and confusion.     Objective:     Vital Signs (Most Recent):  Temp: 98.4 °F (36.9 °C) (05/23/19 1342)  Pulse: 78 (05/23/19 1553)  Resp: 19 (05/23/19 1342)  BP: (!) 179/124 (05/23/19 1553)  SpO2: 100 % (05/23/19 1553) Vital Signs (24h Range):  Temp:  [98.1 °F (36.7 °C)-98.4 °F (36.9 °C)] 98.4 °F (36.9 °C)  Pulse:  [70-88] 78  Resp:  [16-20] 19  SpO2:  [98 %-100 %] 100 %  BP: (156-181)/(105-125) 179/124     Weight: 59 kg (130 lb)  Body mass index is 22.31 kg/m².    Physical Exam   Constitutional: She is oriented to person, place, and time. She appears well-developed and well-nourished. No distress.   HENT:   Head: Normocephalic and atraumatic.   Mouth/Throat: Oropharynx is clear and moist. No oropharyngeal exudate.   Eyes: Pupils are equal, round, and reactive to light. Conjunctivae and EOM are normal. No scleral icterus.   Neck: Normal range of motion. Neck supple. No thyromegaly present.   Cardiovascular: Normal rate, regular rhythm and normal heart sounds. Exam reveals no gallop and no friction rub.   No murmur heard.  Pulmonary/Chest: Effort normal and breath sounds normal. No respiratory distress. She has no rales.   Abdominal: Soft. Bowel sounds are normal. She exhibits no distension. There is no tenderness.   Musculoskeletal: Normal range of motion. She exhibits no edema, tenderness or deformity.   Neurological: She is alert and oriented to person, place, and time. No cranial nerve deficit.   Skin: Skin is warm and dry. Capillary refill takes less than 2 seconds. She is not diaphoretic. No erythema. No pallor.         CRANIAL NERVES     CN III, IV, VI   Pupils are equal, round, and reactive to light.  Extraocular motions are normal.        Significant Labs:   CBC:   Recent Labs   Lab 05/23/19  1204   WBC 2.39*   HGB  8.1*   HCT 25.2*   PLT 89*     CMP:   Recent Labs   Lab 05/23/19  1204      K 4.8      CO2 27   *   BUN 24*   CREATININE 4.6*   CALCIUM 7.6*   PROT 7.2   ALBUMIN 3.0*   BILITOT 0.7   ALKPHOS 497*   AST 26   ALT 27   ANIONGAP 8   EGFRNONAA 12.1*

## 2019-05-23 NOTE — ASSESSMENT & PLAN NOTE
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    Presents with hypertensive urgency, w/ -200, despite multiple BP meds; reports compliance with all medications.  - CTH negative for acute intracranial process  - Received clonidine, hydralazine in ED  - At time of evaluation, reported improvement in her HA    Plan:  - Crit care consulted in ED. Rec consult ophtho for visual disturbance, consider renal ultrasound, renin/aldosterone ratio, urine metanephrines, and Consider addition of spironolactone   - Resume home meds including isosorbide 90 mg, irbesartan 300 mg, coreg 25 BID, verapamil 240 mg qhs, hydralazine 100 mg q8h, clonidine 0.3 q8h  - May require additional IV control  - Added spironolactone per crit care recs  - Opthal consulted  - NSGY consulted

## 2019-05-23 NOTE — CONSULTS
[facility]  Critical Care Medicine  Consult Note    Patient Name: Holly Patel  MRN: [id number]  Admission Date: 5/23/2019  Hospital Length of Stay: 0 days  Code Status: Prior  Attending Physician: Lupe Etienne MD   Primary Care Provider: Stan Sosa MD   Principal Problem: Hypertensive urgency    Consults  Subjective:     HPI:  Ms Patel is a 29 yo F w PMHx significant for hemangioendothelioma s/p OLTx 1992, intraosseous hemangioma s/p excision and cranioplasty 4/22/19, HTN, seizure disorder and ESRD (on HD MWF).     Pt presents with with her mother from [facility] for evaluation of headache and uncontrollable blood pressure. Her headache started yesterday and is located over the left parietal/temporal area without radiation to other areas. It has a sharp and throbbing character with 5/10 intensity. Her headache is accompanied by visual blurriness that has been persistent since the time of her surgery one month ago. She denies fever, chills, but does report sick contact with her mother who has an upper respiratory tract infection. She has been adherent to her blood pressure medication, which includes carvedilol, clonidine, hydralazine, irbesartan and isosorbide mononitrate. Nothing seems to improve her headache or high blood pressure.     She was recently started on AED's for seizure activity she experienced prior to surgery. She has been participating in physical therapy, however had not achieved her baseline status yet.     Hospital/ICU Course:  No notes on file    Past Medical History:   Diagnosis Date    Anemia in ESRD (end-stage renal disease) 10/12/2015    dialysis tues, thursday, sat; access left arm    Chronic rejection of liver transplant 3/22/2016    Depression     Encounter for blood transfusion     ESRD on hemodialysis 9/30/2015    History of recent hospitalization 05/2018    pneumonia    History of splenomegaly 4/12/2016    Immunosuppressed 8/5/2017    Iron  deficiency anemia secondary to inadequate dietary iron intake 2017    She [facility].   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 ()    Moderate protein-calorie malnutrition 2017    MRSA bacteremia 2017    Pneumonia     Prophylactic immunotherapy 2014    Renovascular hypertension 10/2/2015    Secondary hyperparathyroidism 2017    Seizures     Sialadenitis 3/21/2018    Thrombocytopenia 2016       Past Surgical History:   Procedure Laterality Date    BIOPSY, LIVER, TRANSJUGULAR APPROACH N/A 2018    Performed by [facility] at [facility] OR [facility]    BIOPSY-LIVER N/A 2017    [facility]    BIOPSY-LIVER N/A 3/22/2016    Performed by [facility] Diagnostic Provider at [facility] OR [address] [place] [address]     SECTION      x 2    CONIZATION-CERVICAL-LEEP N/A 6/15/2018    Performed by [facility] at [facility] OR    [id number]; upper extremity Left 2015    Performed by Idalia Diaz MD at [facility] OR [facility]    CRANIOPLASTY  2019    Performed by Jose Luis Powell MD at [facility]    CRANIOTOMY-- left crani for clot evac with microscrope Left 2019    Performed by Jose Luis Powell MD at [facility] OR [facility]    EMBOLIZATION, BLOOD VESSEL N/A 2018    Performed by [facility]ren [facility] MD at [facility] CATH LAB    Exam Under Anesthesia N/A 2018    Performed by [facility]am [facility]quin MD at [facility]    Exam under anesthesia N/A 2018    Performed by [facility] MD at [facility] OR    Exam under anesthesia (ADD ON ) N/A 2018    Performed by NICKIE Alvarez MD at [facility] OR    Exam under anesthesia -cervical suturing  N/A 2018    Performed by Lei Sims III, MD at [facility] OR    EXCISION, NEOPLASM, SKULL with Cranioplasty Left 2019    Performed by Jose [facility] MD at [facility]    FISTULOGRAM Left  12/4/2015    Performed by [facility] at [facility] CATH LAB    LIVER BIOPSY      LIVER TRANSPLANT  09/1992    PARATHYROIDECTOMY, Minimally Invasive Bilateral Exploration Bilateral 3/27/2019    Performed by Ashley [facility]pa MD at [facility] OR [facility]    SUTURE REPAIR,CERVIX  6/19/2018    [facility]    TUBAL LIGATION  2010       Review of patient's allergies indicates:   Allergen Reactions    Chloral hydrate Hallucinations     Other reaction(s): Hallucinations  Other reaction(s): Hives    Hydrocodone Other (See Comments)     Mental status changes    Tolerates oxycodone       Family History     Problem Relation (Age of Onset)    Cancer Sister    Heart attack Maternal Uncle    Hypertension Mother, Father        Tobacco Use    Smoking status: Never Smoker    Smokeless tobacco: Never Used   Substance and Sexual Activity    Alcohol use: No    Drug use: No    Sexual activity: Never     Partners: Male      Review of Systems   Constitutional: Negative for fever and unexpected weight change.   HENT: Negative for ear pain, sinus pressure, sneezing and sore throat.    Eyes: Positive for visual disturbance. Negative for pain.   Respiratory: Negative for cough, chest tightness, shortness of breath and wheezing.    Cardiovascular: Negative for chest pain, palpitations and leg swelling.   Gastrointestinal: Negative for abdominal pain, constipation, diarrhea, nausea and vomiting.   Endocrine: Negative for polydipsia and polyuria.   Genitourinary: Negative for decreased urine volume, difficulty urinating, dysuria and urgency.   Musculoskeletal: Negative for arthralgias and myalgias.   Skin: Negative for color change and rash.   Allergic/Immunologic: Negative for environmental allergies and food allergies.   Neurological: Positive for weakness and headaches. Negative for dizziness, syncope and light-headedness.   Psychiatric/Behavioral: Negative for confusion and dysphoric mood. The  patient is not nervous/anxious.      Objective:     Vital Signs (Most Recent):  Temp: 98.4 °F (36.9 °C) (05/23/19 1342)  Pulse: 78 (05/23/19 1553)  Resp: 19 (05/23/19 1342)  BP: (!) 179/124 (05/23/19 1553)  SpO2: 100 % (05/23/19 1553) Vital Signs (24h Range):  Temp:  [98.1 °F (36.7 °C)-98.4 °F (36.9 °C)] 98.4 °F (36.9 °C)  Pulse:  [70-88] 78  Resp:  [16-20] 19  SpO2:  [98 %-100 %] 100 %  BP: (156-181)/(105-125) 179/124   Weight: 59 kg (130 lb)  Body mass index is 22.31 kg/m².    No intake or output data in the 24 hours ending 05/23/19 1712    Physical Exam   Constitutional: She is oriented to person, place, and time. She appears well-developed and well-nourished.   HENT:   Head: Normocephalic and atraumatic.   Cervical LAD on the left side.   Eyes: Pupils are equal, round, and reactive to light. EOM are normal. No scleral icterus.   Neck: Normal range of motion. Neck supple.   Cardiovascular: Normal rate, regular rhythm, normal heart sounds and intact distal pulses.   No murmur heard.  Pulmonary/Chest: Effort normal and breath sounds normal. No respiratory distress. She has no wheezes.   Abdominal: Soft. Bowel sounds are normal. She exhibits ascites. She exhibits no distension. There is no tenderness.   Lymphadenopathy:        Head (left side): Submental and submandibular adenopathy present.     She has cervical adenopathy.   Neurological: She is alert and oriented to person, place, and time.   Skin: Skin is warm and dry.   Psychiatric: She has a normal mood and affect. Her behavior is normal.   Vitals reviewed.      Vents:     Lines/Drains/Airways     Drain                 Hemodialysis AV Fistula Left forearm -- days          Peripheral Intravenous Line                 Peripheral IV - Single Lumen 05/23/19 1215 20 G;1 in Right Forearm less than 1 day              Significant Labs:    CBC/Anemia Profile:  Recent Labs   Lab 05/23/19  1204   WBC 2.39*   HGB 8.1*   HCT 25.2*   PLT 89*   MCV 84   RDW 17.8*         Chemistries:  Recent Labs   Lab 05/23/19  1204      K 4.8      CO2 27   BUN 24*   CREATININE 4.6*   CALCIUM 7.6*   ALBUMIN 3.0*   PROT 7.2   BILITOT 0.7   ALKPHOS 497*   ALT 27   AST 26       All pertinent labs within the past 24 hours have been reviewed.    Significant Imaging: I have reviewed all pertinent imaging results/findings within the past 24 hours.      ABG  No results for input(s): PH, PO2, PCO2, HCO3, BE in the last 168 hours.  Assessment/Plan:     Cardiac/Vascular  * Hypertensive urgency  Pt with history of ESRD on five agents for refractory hypertension. Home agents include carvedilol 25 mg BID, clonidine 0.3 mg Q8, hydralazine 100 q8, irbesartan 300 mg, ISMN 50 mg BID. Recommend resumption of home medications.     Recommendations  - admit to hospital medicine  - consult ophtho for visual disturbance  - consider renal ultrasound, renin/aldosterone ratio, urine metanephrines  - Consider addition of spironolactone   - Please do not hesitate to reach out to [facility] again        Thank you for your consult. I will sign off. Please contact us if you have any additional questions.     Behram Khan, MD  Critical Care Medicine  [facility]

## 2019-05-23 NOTE — HPI
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      Patient was admitted to [facility] from 4/22/2019 to 5/21/19 (29 days).  She originally was admitted to [facility] for excision of L calvarial lesion and cranioplasty with clot evaculation on 4/22. She was continued on vimpat and keppra. She is to receive an additional 500 mg dose of keppra after HD on HD days. The patient had issues with thrombocytopenia post operatively. She has had issues with chronic thrombocytopenia but due to her new neurosurgery, she was transfused platelets to maintain a goal of > 60. She was stepped down to medicine eventually. Most of her issues post operatively were related to her BP and platelets. Heme onc was consulted to assist with her thrombocytopenia and eventually she was given dexamethasone 40 mg daily x 4 days and her platelets did respond. Initially the thought was that she may need radiation or splenic embolization if this fails but the need was negated as her platelets responded. Neurosurgery final recs was for platelet goal > 40k. Her BP continued to be challenging and this was discussed with cardiology and with nephrology. Cardiology recommended that nephrology manage her BPs as she is ESRD. Nephrology stated that the patient suffers with poorly controlled BP and will try to see if there are any other options but for now to continue medical management. Imdur was added to her regimen but she still maintained elevated but entirely asymptomatic. She will follow up with NS as scheduled. She worked with [facility] and was recommended for rehab. She was accepted to [facility].       On the evening of 5/22, patient states that she developed a HA and high BP in the [facility].  She states that her BP started "acting crazy" " and then she noticed she had a HA. The pain is sharp and throbbing and is localized to her left parietal/temporal area without radiation to other areas. Nothing seems to improve her headache or high blood pressure  She also states that she has had blurred vision since her surgery during her last hospital stay.  She takes her BP medicine at home: carvedilol, clonidine, hydralazine, irbesartan and isosorbide mononitrate.  Patient states her last seizure was 1 month ago.     When the patient arrived to the ED, BP was 181/123.  ICU was consulted in the ED, and state patient is stable for hospital medicine.

## 2019-05-23 NOTE — SUBJECTIVE & OBJECTIVE
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 ()    Moderate protein-calorie malnutrition 2017    MRSA bacteremia 2017    Pneumonia     Prophylactic immunotherapy 2014    Renovascular hypertension 10/2/2015    Secondary hyperparathyroidism 2017    Seizures     Sialadenitis 3/21/2018    Thrombocytopenia 2016       Past Surgical History:   Procedure Laterality Date    BIOPSY, LIVER, TRANSJUGULAR APPROACH N/A 2018    [facility]    BIOPSY-LIVER N/A 2017    [facility]    BIOPSY-LIVER N/A 3/22/2016    Performed by [facility] at [facility] OR [address] [place] [address]     SECTION      x 2    CONIZATION-CERVICAL-LEEP N/A 6/15/2018    Performed by [facility] at [facility] OR    [id number]; upper extremity Left 2015    [facility]    CRANIOPLASTY  2019    [facility]    CRANIOTOMY-- left crani for clot evac with microscrope Left 2019    [facility]    EMBOLIZATION, BLOOD VESSEL N/A 2018    Performed by [facility]ren [facility] at [facility] CATH LAB    Exam Under Anesthesia N/A 2018    [facility]    Exam under  anesthesia N/A 6/19/2018    [facility] at [facility] OR    Exam under anesthesia (ADD ON ) N/A 7/9/2018    Performed by NICKIE Alvarez MD at [facility] OR    Exam under anesthesia -cervical suturing  N/A 7/26/2018    Performed by Lei Sims III, MD at [facility] OR    EXCISION, NEOPLASM, SKULL with Cranioplasty Left 4/22/2019    Performed by [facility] at [facility]    FISTULOGRAM Left 12/4/2015    Performed by [facility]alia [facility] at [facility] CATH LAB    LIVER BIOPSY      LIVER TRANSPLANT  09/1992    PARATHYROIDECTOMY, Minimally Invasive Bilateral Exploration Bilateral 3/27/2019    Performed by Ashley [facility]allpa [facility] at [facility]    SUTURE REPAIR,CERVIX  6/19/2018    Performed by [facility] at [facility] OR    TUBAL LIGATION  2010       Review of patient's allergies indicates:   Allergen Reactions    Chloral hydrate Hallucinations     Other reaction(s): Hallucinations  Other reaction(s): Hives    Hydrocodone Other (See Comments)     Mental status changes    Tolerates oxycodone       Family History     Problem Relation (Age of Onset)    Cancer Sister    Heart attack Maternal Uncle    Hypertension Mother, Father        Tobacco Use    Smoking status: Never Smoker    Smokeless tobacco: Never Used   Substance and Sexual Activity    Alcohol use: No    Drug use: No    Sexual activity: Never     Partners: Male      Review of Systems   Constitutional: Negative for fever and unexpected weight change.   HENT: Negative for ear pain, sinus pressure, sneezing and sore throat.    Eyes: Positive for visual disturbance. Negative for pain.   Respiratory: Negative for cough, chest tightness, shortness of breath and wheezing.    Cardiovascular: Negative for chest pain, palpitations and leg swelling.   Gastrointestinal: Negative for abdominal pain, constipation, diarrhea, nausea and vomiting.   Endocrine: Negative for polydipsia and polyuria.   Genitourinary: Negative for  decreased urine volume, difficulty urinating, dysuria and urgency.   Musculoskeletal: Negative for arthralgias and myalgias.   Skin: Negative for color change and rash.   Allergic/Immunologic: Negative for environmental allergies and food allergies.   Neurological: Positive for weakness and headaches. Negative for dizziness, syncope and light-headedness.   Psychiatric/Behavioral: Negative for confusion and dysphoric mood. The patient is not nervous/anxious.      Objective:     Vital Signs (Most Recent):  Temp: 98.4 °F (36.9 °C) (05/23/19 1342)  Pulse: 78 (05/23/19 1553)  Resp: 19 (05/23/19 1342)  BP: (!) 179/124 (05/23/19 1553)  SpO2: 100 % (05/23/19 1553) Vital Signs (24h Range):  Temp:  [98.1 °F (36.7 °C)-98.4 °F (36.9 °C)] 98.4 °F (36.9 °C)  Pulse:  [70-88] 78  Resp:  [16-20] 19  SpO2:  [98 %-100 %] 100 %  BP: (156-181)/(105-125) 179/124   Weight: 59 kg (130 lb)  Body mass index is 22.31 kg/m².    No intake or output data in the 24 hours ending 05/23/19 1712    Physical Exam   Constitutional: She is oriented to person, place, and time. She appears well-developed and well-nourished.   HENT:   Head: Normocephalic and atraumatic.   Cervical LAD on the left side.   Eyes: Pupils are equal, round, and reactive to light. EOM are normal. No scleral icterus.   Neck: Normal range of motion. Neck supple.   Cardiovascular: Normal rate, regular rhythm, normal heart sounds and intact distal pulses.   No murmur heard.  Pulmonary/Chest: Effort normal and breath sounds normal. No respiratory distress. She has no wheezes.   Abdominal: Soft. Bowel sounds are normal. She exhibits ascites. She exhibits no distension. There is no tenderness.   Lymphadenopathy:        Head (left side): Submental and submandibular adenopathy present.     She has cervical adenopathy.   Neurological: She is alert and oriented to person, place, and time.   Skin: Skin is warm and dry.   Psychiatric: She has a normal mood and affect. Her behavior is normal.    Vitals reviewed.      Vents:     Lines/Drains/Airways     Drain                 Hemodialysis AV Fistula Left forearm -- days          Peripheral Intravenous Line                 Peripheral IV - Single Lumen 05/23/19 1215 20 G;1 in Right Forearm less than 1 day              Significant Labs:    CBC/Anemia Profile:  Recent Labs   Lab 05/23/19  1204   WBC 2.39*   HGB 8.1*   HCT 25.2*   PLT 89*   MCV 84   RDW 17.8*        Chemistries:  Recent Labs   Lab 05/23/19  1204      K 4.8      CO2 27   BUN 24*   CREATININE 4.6*   CALCIUM 7.6*   ALBUMIN 3.0*   PROT 7.2   BILITOT 0.7   ALKPHOS 497*   ALT 27   AST 26       All pertinent labs within the past 24 hours have been reviewed.    Significant Imaging: I have reviewed all pertinent imaging results/findings within the past 24 hours.

## 2019-05-23 NOTE — ED TRIAGE NOTES
Pt. Presents to ED today with c/o high blood pressure and left temporal headache x1 day. Dialysis yesterday, full run, (MWF schedule). States accompanied sob, worse with exertion. Denies cp, fevers, abd pain, n/v/d. Speaking full sentences, non labored breathing, sating 99% RA. No acute distress noted per this RN.

## 2019-05-23 NOTE — H&P
[facility] Hospital Medicine  History & Physical    Patient Name: Holly Patel  MRN: [id number]  Admission Date: 5/23/2019  Attending Physician: Lupe Etienne MD   Primary Care Provider: Stan Sosa MD    Hospital Medicine Team: [facility] HOSP MED 3 Jacky Ross MD     Patient information was obtained from patient and past medical records.     Subjective:     Principal Problem:Hypertensive urgency    Chief Complaint:   Chief Complaint   Patient presents with    Hypertension     Pt coming from [facility] for evaluation of hypertension. Pt had initial pressure 182/115. Pt has been given morning P.O. antihypertensive medications.        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      Patient was admitted to [facility] from 4/22/2019 to 5/21/19 (29 days).  She originally was admitted to [facility] for excision of L calvarial lesion and cranioplasty with clot evaculation on 4/22. She was continued on vimpat and keppra. She is to receive an additional 500 mg dose of keppra after HD on HD days. The patient had issues with thrombocytopenia post operatively. She has had issues with chronic thrombocytopenia but due to her new neurosurgery, she was transfused platelets to maintain a goal of > 60. She was stepped down to medicine eventually. Most of her issues post operatively were related to her BP and platelets. Heme onc was consulted to assist with her thrombocytopenia and eventually she was given dexamethasone 40 mg daily x 4 days and her platelets did respond. Initially the thought was that she may need radiation or splenic embolization if this fails but the need was negated as her platelets responded. Neurosurgery final recs was for platelet  "goal > 40k. Her BP continued to be challenging and this was discussed with cardiology and with nephrology. Cardiology recommended that nephrology manage her BPs as she is ESRD. Nephrology stated that the patient suffers with poorly controlled BP and will try to see if there are any other options but for now to continue medical management. Imdur was added to her regimen but she still maintained elevated but entirely asymptomatic. She will follow up with NS as scheduled. She worked with [facility] and was recommended for rehab. She was accepted to [facility].       On the evening of 5/22, patient states that she developed a HA and high BP in the [facility].  She states that her BP started "acting crazy" and then she noticed she had a HA. The pain is sharp and throbbing and is localized to her left parietal/temporal area without radiation to other areas. Nothing seems to improve her headache or high blood pressure  She also states that she has had blurred vision since her surgery during her last hospital stay.  She takes her BP medicine at home: carvedilol, clonidine, hydralazine, irbesartan and isosorbide mononitrate.  Patient states her last seizure was 1 month ago.     When the patient arrived to the ED, BP was 181/123.  ICU was consulted in the ED, and state patient is stable for hospital medicine.     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 " ()    Moderate protein-calorie malnutrition 2017    MRSA bacteremia 2017    Pneumonia     Prophylactic immunotherapy 2014    Renovascular hypertension 10/2/2015    Secondary hyperparathyroidism 2017    Seizures     Sialadenitis 3/21/2018    Thrombocytopenia 2016       Past Surgical History:   Procedure Laterality Date    BIOPSY, LIVER, TRANSJUGULAR APPROACH N/A 2018    [facility]    BIOPSY-LIVER N/A 2017    [facility]    BIOPSY-LIVER N/A 3/22/2016    [facility]     SECTION      x 2    CONIZATION-CERVICAL-LEEP N/A 6/15/2018    Per[facility] by [facility] at [facility] OR    [id number]; upper extremity Left 2015    Performed by Idalia [facility] at [facility]    CRANIOPLASTY  2019    Performed by Jose [facility] at [facility]    CRANIOTOMY-- left crani for clot evac with microscrope Left 2019    Performed by [facility] at [facility]    EMBOLIZATION, BLOOD VESSEL N/A 2018    Performed by Aren Ramos MD at [facility] CATH LAB    Exam Under Anesthesia N/A 2018    Performed by [facility] at [facility]    Exam under anesthesia N/A 2018    Performed by [facility] at [facility] OR    Exam under anesthesia (ADD ON ) N/A 2018    Performed by NICKIE Alvarez MD at [facility] OR    Exam under anesthesia -cervical suturing  N/A 2018    Performed by Lei Sims III, MD at [facility] OR    EXCISION, NEOPLASM, SKULL with Cranioplasty Left 2019    Performed by [facility] at [facility]    FISTULOGRAM Left 2015    Performed by Idalia Diaz MD at [facility] CATH LAB    LIVER BIOPSY      LIVER TRANSPLANT  1992    PARATHYROIDECTOMY, Minimally Invasive Bilateral Exploration Bilateral 3/27/2019    Performed  by [facility] at [facility]    SUTURE REPAIR,CERVIX  6/19/2018    Performed by [facility] at [facility] OR    TUBAL LIGATION  2010       Review of patient's allergies indicates:   Allergen Reactions    Chloral hydrate Hallucinations     Other reaction(s): Hallucinations  Other reaction(s): Hives    Hydrocodone Other (See Comments)     Mental status changes    Tolerates oxycodone       No current facility-administered medications on file prior to encounter.      Current Outpatient Medications on File Prior to Encounter   Medication Sig    bacitracin 500 unit/gram ointment Apply topically as needed.    bisacodyl (DULCOLAX) 10 mg Supp Place 1 suppository (10 mg total) rectally once daily.    carvedilol (COREG) 25 MG tablet Take 1 tablet (25 mg total) by mouth 2 (two) times daily.    cloNIDine (CATAPRES) 0.3 MG tablet Take 1 tablet (0.3 mg total) by mouth every 8 (eight) hours.    epoetin anca-epbx (RETACRIT) 3,000 unit/mL injection Inject 1 mL (3,000 Units total) into the skin every Mon, Wed, Fri.    hydrALAZINE (APRESOLINE) 100 MG tablet Take 1 tablet (100 mg total) by mouth every 8 (eight) hours.    irbesartan (AVAPRO) 300 MG tablet Take 1 tablet (300 mg total) by mouth every morning.    isosorbide mononitrate (IMDUR) 30 MG 24 hr tablet Take 3 tablets (90 mg total) by mouth once daily.    lacosamide (VIMPAT) 50 mg Tab Take 1 tablet (50 mg total) by mouth 2 (two) times daily.    levETIRAcetam (KEPPRA) 500 MG Tab Take 1 tablet (500 mg total) by mouth 2 (two) times daily.    levETIRAcetam (KEPPRA) 500 MG Tab Take 1 tablet (500 mg total) by mouth every Mon, Wed, Fri.    ondansetron (ZOFRAN-ODT) 4 MG TbDL Take 1 tablet (4 mg total) by mouth every 6 (six) hours as needed.    oxyCODONE (ROXICODONE) 5 MG immediate release tablet Take 1 tablet (5 mg total) by mouth every 6 (six) hours as needed.    pantoprazole (PROTONIX) 40 MG tablet Take 1 tablet (40 mg total) by mouth once daily.     polyethylene glycol (GLYCOLAX) 17 gram PwPk Take 17 g by mouth 2 (two) times daily.    senna-docusate 8.6-50 mg (PERICOLACE) 8.6-50 mg per tablet Take 2 tablets by mouth once daily.    tacrolimus (PROGRAF) 5 MG Cap Take 1 capsule (5 mg total) by mouth once daily.    verapamil (CALAN-SR) 240 MG CR tablet Take 1 tablet (240 mg total) by mouth every evening.     Family History     Problem Relation (Age of Onset)    Cancer Sister    Heart attack Maternal Uncle    Hypertension Mother, Father        Tobacco Use    Smoking status: Never Smoker    Smokeless tobacco: Never Used   Substance and Sexual Activity    Alcohol use: No    Drug use: No    Sexual activity: Never     Partners: Male     Review of Systems   Constitutional: Negative for chills, diaphoresis and fatigue.   HENT: Negative for congestion and sinus pressure.    Eyes: Positive for photophobia and visual disturbance (blurry).   Respiratory: Negative for cough, chest tightness and shortness of breath.    Cardiovascular: Negative for chest pain, palpitations and leg swelling.   Gastrointestinal: Negative for abdominal pain, constipation, diarrhea, nausea and vomiting.   Genitourinary: Positive for difficulty urinating.   Musculoskeletal: Negative for arthralgias, myalgias, neck pain and neck stiffness.   Skin: Negative for color change, pallor and rash.   Neurological: Positive for headaches. Negative for dizziness, tremors, syncope, weakness, light-headedness and numbness.   Psychiatric/Behavioral: Negative for agitation and confusion.     Objective:     Vital Signs (Most Recent):  Temp: 98.4 °F (36.9 °C) (05/23/19 1342)  Pulse: 78 (05/23/19 1553)  Resp: 19 (05/23/19 1342)  BP: (!) 179/124 (05/23/19 1553)  SpO2: 100 % (05/23/19 1553) Vital Signs (24h Range):  Temp:  [98.1 °F (36.7 °C)-98.4 °F (36.9 °C)] 98.4 °F (36.9 °C)  Pulse:  [70-88] 78  Resp:  [16-20] 19  SpO2:  [98 %-100 %] 100 %  BP: (156-181)/(105-125) 179/124     Weight: 59 kg (130 lb)  Body  mass index is 22.31 kg/m².    Physical Exam   Constitutional: She is oriented to person, place, and time. She appears well-developed and well-nourished. No distress.   HENT:   Head: Normocephalic and atraumatic.   Mouth/Throat: Oropharynx is clear and moist. No oropharyngeal exudate.   Eyes: Pupils are equal, round, and reactive to light. Conjunctivae and EOM are normal. No scleral icterus.   Neck: Normal range of motion. Neck supple. No thyromegaly present.   Cardiovascular: Normal rate, regular rhythm and normal heart sounds. Exam reveals no gallop and no friction rub.   No murmur heard.  Pulmonary/Chest: Effort normal and breath sounds normal. No respiratory distress. She has no rales.   Abdominal: Soft. Bowel sounds are normal. She exhibits no distension. There is no tenderness.   Musculoskeletal: Normal range of motion. She exhibits no edema, tenderness or deformity.   Neurological: She is alert and oriented to person, place, and time. No cranial nerve deficit.   Skin: Skin is warm and dry. Capillary refill takes less than 2 seconds. She is not diaphoretic. No erythema. No pallor.         CRANIAL NERVES     CN III, IV, VI   Pupils are equal, round, and reactive to light.  Extraocular motions are normal.        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    Presents  with hypertensive urgency, w/ -200, despite multiple BP meds; reports compliance with all medications.  - CTH negative for acute intracranial process  - Received clonidine, hydralazine in ED  - At time of evaluation, reported improvement in her HA    Plan:  - Crit care consulted in ED. Rec consult ophtho for visual disturbance, consider renal ultrasound, renin/aldosterone ratio, urine metanephrines, and Consider addition of spironolactone   - Resume home meds including isosorbide 90 mg, irbesartan 300 mg, coreg 25 BID, verapamil 240 mg qhs, hydralazine 100 mg q8h, clonidine 0.3 q8h  - May require additional IV control  - Added spironolactone per crit care recs  - Opthal consulted  - NSGY consulted      Discharge planning issues  Likely discharge back to [facility] when medically ready      S/P craniotomy  s/p intraosseous hemangioma s/p excision  CTH negative  Neurosurgery consulted      Seizures  Continue with vimpat and keppra      Immunosuppressed  See liver tx      Anemia in ESRD (end-stage renal disease)  2/2 ESRD. On EPO  Nephrology consulted      Renovascular hypertension  See HTN urgency      ESRD on hemodialysis  ESRD MWF  Nephrology consulted      Liver replaced by transplant  S/p liver transplant 1991  Continue with tacro  Daily tacro levels        VTE Risk Mitigation (From admission, onward)        Ordered     IP VTE LOW RISK PATIENT  Once      05/23/19 1809     Place sequential compression device  Until discontinued      05/23/19 1809             Jacky Ross MD  Department of Hospital Medicine   [facility]

## 2019-05-24 NOTE — NURSING
2330:  Pt BP decreased to 98/58   Paged team, now new order at this time. Will recheck and notify team  0030: BP elevated 174/115, team notified. Will keep monitoring. C/O headache, acetaminophen given.   0310: BP elevated 200/134. Paged team, keep monitoring. Will check in 1/2 hr and report to team .

## 2019-05-24 NOTE — PLAN OF CARE
Problem: Adult Inpatient Plan of Care  Goal: Plan of Care Review  Outcome: Ongoing (interventions implemented as appropriate)     05/24/19 0404   Plan of Care Review   Plan of Care Reviewed With patient     Pt asymptomatic, C/O headache, acetaminophen given. BP remain elevated. Will monitor.

## 2019-05-24 NOTE — PROGRESS NOTES
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      Patient was admitted to [facility] from 4/22/2019 to 5/21/19 (29 days).  She originally was admitted to [facility] for excision of L calvarial lesion and cranioplasty with clot evaculation on 4/22. She was continued on vimpat and keppra. She is to receive an additional 500 mg dose of keppra after HD on HD days. The patient had issues with thrombocytopenia post operatively. She has had issues with chronic thrombocytopenia but due to her new neurosurgery, she was transfused platelets to maintain a goal of > 60. She was stepped down to medicine eventually. Most of her issues post operatively were related to her BP and platelets. Heme onc was consulted to assist with her thrombocytopenia and eventually she was given dexamethasone 40 mg daily x 4 days and her platelets did respond. Initially the thought was that she may need radiation or splenic embolization if this fails but the need was negated as her platelets responded. Neurosurgery final recs was for platelet goal > 40k. Her BP continued to be challenging and this was discussed with cardiology and with nephrology. Cardiology recommended that nephrology manage her BPs as she is ESRD. Nephrology stated that the patient suffers with poorly controlled BP and will try to see if there are  "any other options but for now to continue medical management. Imdur was added to her regimen but she still maintained elevated but entirely asymptomatic. She will follow up with NS as scheduled. She worked with [facility] and was recommended for rehab. She was accepted to [facility].       On the evening of 5/22, patient states that she developed a HA and high BP in the [facility].  She states that her BP started "acting crazy" and then she noticed she had a HA. The pain is sharp and throbbing and is localized to her left parietal/temporal area without radiation to other areas. Nothing seems to improve her headache or high blood pressure  She also states that she has had blurred vision since her surgery during her last hospital stay.  She takes her BP medicine at home: carvedilol, clonidine, hydralazine, irbesartan and isosorbide mononitrate.  Patient states her last seizure was 1 month ago.     When the patient arrived to the ED, BP was 181/123.  ICU was consulted in the ED, and state patient is stable for hospital medicine.     Hospital Course:  Admitted to hospital medicine. Neurosurgery, Ophthalmology and critical care consulted. CTH negative. Home blood pressure regimen resumed with improvement in headache and blurry vision. Nephrology consulted for HD.  05/24/2019 Overnight patient  and given home hydralazine dose with improvement. This am reports resolution of HA, and feels like blurry vision somewhat improved.    Past Medical History:   Diagnosis Date    Anemia in ESRD (end-stage renal disease) 10/12/2015    dialysis tues, thursday, sat; access left arm    Chronic rejection of liver transplant 3/22/2016    Depression     Encounter for blood transfusion     ESRD on hemodialysis 9/30/2015    History of recent hospitalization 05/2018    pneumonia    History of splenomegaly 4/12/2016    Immunosuppressed 8/5/2017    Iron deficiency anemia secondary to inadequate dietary iron intake 8/16/2017    She " receives IV iron periodically at the [facility] Center.   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 at [facility] OR 2ND FLR    BIOPSY-LIVER N/A 2017    Performed by [facility] Diagnostic Provider at [facility] OR 2ND FLR    BIOPSY-LIVER N/A 3/22/2016    Performed by [facility] Diagnostic Provider at [facility] OR [address] [place] [address]     SECTION      x 2    CONIZATION-CERVICAL-LEEP N/A 6/15/2018    Performed by Neelam Marroquin MD at [facility] OR    [id number]; upper extremity Left 2015    Performed by Idalia Diaz MD at [facility] OR 2ND FLR    CRANIOPLASTY  2019    Performed by Jose Luis Powell MD at [facility] OR 2ND FLR    CRANIOTOMY-- left crani for clot evac with microscrope Left 2019    Performed by Jose [facility] Powell MD at [facility] OR 2ND FLR    EMBOLIZATION, BLOOD VESSEL N/A 2018    Performed by Aren Ramos MD at [facility] CATH LAB    Exam Under Anesthesia N/A 2018    Performed by [facility]am [facility]quin MD at [facility] OR 2ND FLR    Exam under anesthesia N/A 2018    Performed by [facility]am [facility]roquin MD at [facility] OR    Exam under anesthesia (ADD ON ) N/A 2018    Performed by NICKIE Alvarez MD at [facility] OR    Exam under anesthesia -cervical suturing  N/A 2018    Performed by Lei Sims III, MD at [facility] OR    EXCISION, NEOPLASM, SKULL with Cranioplasty Left 2019    Performed by Jose Luis Powell MD at [facility] OR 2ND FLR    FISTULOGRAM Left 2015    Performed by Idalia Diaz MD at [facility] CATH LAB    LIVER BIOPSY       LIVER TRANSPLANT  09/1992    PARATHYROIDECTOMY, Minimally Invasive Bilateral Exploration Bilateral 3/27/2019    Performed by [facility] at [facility] OR 2ND FLR    SUTURE REPAIR,CERVIX  6/19/2018    Performed [facility] at [facility] OR    TUBAL LIGATION  2010       Review of patient's allergies indicates:   Allergen Reactions    Chloral hydrate Hallucinations     Other reaction(s): Hallucinations  Other reaction(s): Hives    Hydrocodone Other (See Comments)     Mental status changes    Tolerates oxycodone       No current facility-administered medications on file prior to encounter.      Current Outpatient Medications on File Prior to Encounter   Medication Sig    bacitracin 500 unit/gram ointment Apply topically as needed.    bisacodyl (DULCOLAX) 10 mg Supp Place 1 suppository (10 mg total) rectally once daily.    carvedilol (COREG) 25 MG tablet Take 1 tablet (25 mg total) by mouth 2 (two) times daily.    cloNIDine (CATAPRES) 0.3 MG tablet Take 1 tablet (0.3 mg total) by mouth every 8 (eight) hours.    epoetin anca-epbx (RETACRIT) 3,000 unit/mL injection Inject 1 mL (3,000 Units total) into the skin every Mon, Wed, Fri.    hydrALAZINE (APRESOLINE) 100 MG tablet Take 1 tablet (100 mg total) by mouth every 8 (eight) hours.    irbesartan (AVAPRO) 300 MG tablet Take 1 tablet (300 mg total) by mouth every morning.    isosorbide mononitrate (IMDUR) 30 MG 24 hr tablet Take 3 tablets (90 mg total) by mouth once daily.    lacosamide (VIMPAT) 50 mg Tab Take 1 tablet (50 mg total) by mouth 2 (two) times daily.    levETIRAcetam (KEPPRA) 500 MG Tab Take 1 tablet (500 mg total) by mouth 2 (two) times daily.    levETIRAcetam (KEPPRA) 500 MG Tab Take 1 tablet (500 mg total) by mouth every Mon, Wed, Fri.    ondansetron (ZOFRAN-ODT) 4 MG TbDL Take 1 tablet (4 mg total) by mouth every 6 (six) hours as needed.    oxyCODONE (ROXICODONE) 5 MG immediate release tablet Take 1 tablet (5 mg total) by  mouth every 6 (six) hours as needed.    pantoprazole (PROTONIX) 40 MG tablet Take 1 tablet (40 mg total) by mouth once daily.    polyethylene glycol (GLYCOLAX) 17 gram PwPk Take 17 g by mouth 2 (two) times daily.    senna-docusate 8.6-50 mg (PERICOLACE) 8.6-50 mg per tablet Take 2 tablets by mouth once daily.    tacrolimus (PROGRAF) 5 MG Cap Take 1 capsule (5 mg total) by mouth once daily.    verapamil (CALAN-SR) 240 MG CR tablet Take 1 tablet (240 mg total) by mouth every evening.     Family History     Problem Relation (Age of Onset)    Cancer Sister    Heart attack Maternal Uncle    Hypertension Mother, Father        Tobacco Use    Smoking status: Never Smoker    Smokeless tobacco: Never Used   Substance and Sexual Activity    Alcohol use: No    Drug use: No    Sexual activity: Never     Partners: Male     Review of Systems   Constitutional: Negative for chills, diaphoresis and fatigue.   Eyes: Positive for visual disturbance (blurry).   Respiratory: Negative for cough, chest tightness and shortness of breath.    Cardiovascular: Negative for chest pain, palpitations and leg swelling.   Gastrointestinal: Negative for abdominal pain, constipation, diarrhea, nausea and vomiting.   Genitourinary: Positive for difficulty urinating.   Musculoskeletal: Negative for arthralgias, myalgias, neck pain and neck stiffness.   Skin: Negative for color change, pallor and rash.   Neurological: Negative for dizziness, light-headedness, numbness and headaches.     Objective:     Vital Signs (Most Recent):  Temp: 97 °F (36.1 °C) (05/24/19 1124)  Pulse: 75 (05/24/19 1124)  Resp: 18 (05/24/19 1124)  BP: (!) 172/104 (05/24/19 1124)  SpO2: 95 % (05/24/19 1124) Vital Signs (24h Range):  Temp:  [97 °F (36.1 °C)-98.8 °F (37.1 °C)] 97 °F (36.1 °C)  Pulse:  [74-84] 75  Resp:  [16-18] 18  SpO2:  [92 %-100 %] 95 %  BP: ()/() 172/104     Weight: 59 kg (130 lb)  Body mass index is 22.31 kg/m².    Physical Exam    Constitutional: She is oriented to person, place, and time. She appears well-developed and well-nourished. No distress.   HENT:   Head: Normocephalic and atraumatic.   Eyes: Conjunctivae and EOM are normal. No scleral icterus.   Neck: Normal range of motion. Neck supple. No thyromegaly present.   Cardiovascular: Normal rate, regular rhythm and normal heart sounds. Exam reveals no gallop and no friction rub.   No murmur heard.  Pulmonary/Chest: Effort normal and breath sounds normal. No respiratory distress. She has no rales.   Abdominal: Soft. Bowel sounds are normal. She exhibits no distension. There is no tenderness.   Musculoskeletal: Normal range of motion. She exhibits no edema, tenderness or deformity.   Neurological: She is alert and oriented to person, place, and time. No cranial nerve deficit.   Skin: Skin is warm and dry. Capillary refill takes less than 2 seconds. She is not diaphoretic. No erythema. No pallor.         CRANIAL NERVES     CN III, IV, VI   Extraocular motions are normal.        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    Presents with hypertensive urgency, w/ -200, despite multiple BP meds; reports compliance with all medications.  - CTH negative for  acute intracranial process  - Received clonidine, hydralazine in ED  - At time of evaluation, reported improvement in her HA    Plan:  - Crit care consulted in ED, stable for floor. Plan for BP optimization  - Resumed home meds including isosorbide 90 mg, irbesartan 300 mg, coreg 25 BID, verapamil 240 mg qhs, hydralazine 100 mg q8h, clonidine 0.3 q8h  - Opthal consulted, will f/u outpatient.  - NSGY consulted: CTH stable from post-operative scan, No operative management, Recommend optimization of HTN  - Nephrology consulted for ESRD, d/w nephrology, patient has typically been getting 3 L UF, may require more if tolerates  - Will consider additional agents / replacements if no improvement with HD    Discharge planning issues  Likely discharge back to O[facility]NF when medically ready      S/P craniotomy  s/p intraosseous hemangioma s/p excision  CTH negative  Neurosurgery consulted    Seizures  Continue with vimpat and keppra    Immunosuppressed  See liver tx    Anemia in ESRD (end-stage renal disease)  2/2 ESRD. On EPO  Nephrology consulted    Renovascular hypertension  See HTN urgency    ESRD on hemodialysis  ESRD MWF  Nephrology consulted  Renal diet    Liver replaced by transplant  S/p liver transplant 1991  Continue with tacro  Daily tacro levels    VTE Risk Mitigation (From admission, onward)        Ordered     IP VTE LOW RISK PATIENT  Once      05/23/19 1809     Place sequential compression device  Until discontinued      05/23/19 1809          Jacky Ross MD  Department of Hospital Medicine   [facility]

## 2019-05-24 NOTE — CONSULTS
[facility]  Neurosurgery  Consult Note    Consults  Subjective:     Chief Complaint/Reason for Admission: HA & HTN    History of Present Illness: 28F w/ PMH liver transplant in 91', thyroidectomy, ESRD (on HD MWF) and craniotomy for tumor 4/22 who [place] [facility] [place] for HA and hypertension. Patient states that since the surgery, she has had multiple HAs that typically coincide with elevated blood pressure. She describes them as sharp pain  That localize to the left side of her head and rates the pain 10/10. She also reports blurred vision with these episodes. She denies F/N/C/V, Sz/AMS, weakness/numbness/tingling, gait instability, dizziness, incontinence, SOB/CP, MENDOSA, neck stiffness.          (Not in a hospital admission)    Review of patient's allergies indicates:   Allergen Reactions    Chloral hydrate Hallucinations     Other reaction(s): Hallucinations  Other reaction(s): Hives    Hydrocodone Other (See Comments)     Mental status changes    Tolerates oxycodone       Past Medical History:   Diagnosis Date    Anemia in ESRD (end-stage renal disease) 10/12/2015    dialysis tues, thursday, sat; access left arm    Chronic rejection of liver transplant 3/22/2016    Depression     Encounter for blood transfusion     ESRD on hemodialysis 9/30/2015    History of recent hospitalization 05/2018    pneumonia    History of splenomegaly 4/12/2016    Immunosuppressed 8/5/2017    Iron deficiency anemia secondary to inadequate dietary iron intake 8/16/2017    She receives [facility] iron periodically at the [facility].   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)    Moderate protein-calorie malnutrition 8/16/2017    MRSA bacteremia 8/6/2017    Pneumonia     Prophylactic immunotherapy 8/4/2014    Renovascular hypertension 10/2/2015    Secondary hyperparathyroidism 8/5/2017    Seizures     Sialadenitis 3/21/2018    Thrombocytopenia 4/12/2016     Past  Surgical History:   Procedure Laterality Date    BIOPSY, LIVER, TRANSJUGULAR APPROACH N/A 2018    Performed by [facility] Provider at [facility] OR 2ND FLR    BIOPSY-LIVER N/A 2017    Performed by [facility] Provider at [facility] OR 2ND FLR    BIOPSY-LIVER N/A 3/22/2016    Performed by [facility] Provider at [facility] OR 2ND FLR     SECTION      x 2    CONIZATION-CERVICAL-LEEP N/A 6/15/2018    Performed by Neelam [facility]roquin MD at [facility] [place]    [id number]; upper extremity Left 2015    Performed by Idalia Diaz MD at [facility] OR 2ND FLR    CRANIOPLASTY  2019    Performed by Jose Luis Powell MD at [facility] OR 2ND FLR    CRANIOTOMY-- left crani for clot evac with microscrope Left 2019    Performed by Jose Luis Powell MD at [facility] OR 2ND FLR    EMBOLIZATION, BLOOD VESSEL N/A 2018    Performed by Aren Ramos MD at [facility] CATH LAB    Exam Under Anesthesia N/A 2018    Performed by Neelam Marroquin MD at [facility] OR 2ND FLR    Exam under anesthesia N/A 2018    Performed by [facility]elam [facility]quin MD at [facility] [place]    Exam under anesthesia (ADD ON ) N/A 2018    Performed by NICKIE Alvarez MD at [facility] [place]    Exam under anesthesia -cervical suturing  N/A 2018    Performed by Lei Sims III, MD at [facility] [place]    EXCISION, NEOPLASM, SKULL with Cranioplasty Left 2019    Performed by Jose Luis Powell MD at [facility] OR 2ND FLR    FISTULOGRAM Left 2015    Performed by Idalia Diaz MD at [facility] CATH LAB    LIVER BIOPSY      LIVER TRANSPLANT  1992    PARATHYROIDECTOMY, Minimally Invasive Bilateral Exploration Bilateral 3/27/2019    Performed by Ashley Guallpa MD at [facility] OR 2ND FLR    SUTURE REPAIR,CERVIX  2018    Performed by Neelam [facility]quin MD at [facility] [place]    TUBAL LIGATION       Family History     Problem Relation (Age of Onset)    Cancer Sister    Heart attack Maternal Uncle    Hypertension Mother, Father        Tobacco  Use    Smoking status: Never Smoker    Smokeless tobacco: Never Used   Substance and Sexual Activity    Alcohol use: No    Drug use: No    Sexual activity: Never     Partners: Male     Review of Systems   Constitutional: Negative for activity change, chills, fatigue, fever and unexpected weight change.   HENT: Negative for tinnitus and voice change.    Eyes: Positive for visual disturbance. Negative for photophobia.   Respiratory: Negative for cough, choking, chest tightness, shortness of breath, wheezing and stridor.    Cardiovascular: Negative for chest pain and palpitations.   Gastrointestinal: Negative for abdominal distention, abdominal pain, constipation, diarrhea, nausea and vomiting.   Endocrine: Negative for polydipsia, polyphagia and polyuria.   Genitourinary: Negative for difficulty urinating, dysuria, frequency, hematuria and urgency.   Musculoskeletal: Negative for back pain, gait problem and neck stiffness.   Skin: Negative for pallor, rash and wound.   Neurological: Positive for headaches. Negative for dizziness, tremors, seizures, syncope, facial asymmetry, speech difficulty, weakness, light-headedness and numbness.   Psychiatric/Behavioral: Negative for agitation, behavioral problems and confusion.     Objective:     Weight: 59 kg (130 lb)  Body mass index is 22.31 kg/m².  Vital Signs (Most Recent):  Temp: 98.4 °F (36.9 °C) (05/23/19 1342)  Pulse: 78 (05/23/19 1553)  Resp: 19 (05/23/19 1342)  BP: (!) 179/115 (05/23/19 1833)  SpO2: 100 % (05/23/19 1553) Vital Signs (24h Range):  Temp:  [98.1 °F (36.7 °C)-98.4 °F (36.9 °C)] 98.4 °F (36.9 °C)  Pulse:  [70-88] 78  Resp:  [16-20] 19  SpO2:  [98 %-100 %] 100 %  BP: (156-181)/(105-125) 179/115                        Hemodialysis AV Fistula Left forearm (Active)   Needle Size Buttonhole 5/20/2019  9:00 AM   Site Assessment Clean;Dry;Intact;No redness;No swelling 5/21/2019  4:00 AM   Patency Present;Thrill;Bruit 5/21/2019  4:00 AM   Status Deaccessed  5/21/2019  4:00 AM   Flows Good 5/20/2019  1:15 PM   Dressing Intervention New dressing 5/19/2019  8:00 AM   Dressing Status Clean;Dry;Intact 5/21/2019  4:00 AM   Site Condition No complications 5/21/2019  4:00 AM   Dressing Gauze 5/19/2019  8:00 AM   Drainage Description Other (Comment) 4/14/2018  5:26 PM     Neurosurgery Physical Exam  General: AOx3, GCS [id number]  CNII-XII: Intact on fine exam, PERRL, EOMi, facial sensation preserved, no facial assymetry, tongue/uvula/palate midline, shoulder shrug equal, no pronator drift  Extremities: 5/5 motor throughout, sensorium intact throughout, coordination intact throughout, DTRs 2+, no pathological reflexes, no sensory level present    Wound C/D/I, well healing    Significant Labs:  Recent Labs   Lab 05/23/19  1204   *      K 4.8      CO2 27   BUN 24*   CREATININE 4.6*   CALCIUM 7.6*     Recent Labs   Lab 05/23/19  1204   WBC 2.39*   HGB 8.1*   HCT 25.2*   PLT 89*     Recent Labs   Lab 05/23/19  1204   INR 1.2     Microbiology Results (last 7 days)     ** No results found for the last 168 hours. **        ABGs: No results for input(s): PH, PCO2, PO2, HCO3, POCSATURATED, BE in the last 48 hours.  Cardiac markers: No results for input(s): CKMB, CPKMB, TROPONINT, TROPONINI, MYOGLOBIN in the last 48 hours.  CMP:   Recent Labs   Lab 05/23/19  1204   *   CALCIUM 7.6*   ALBUMIN 3.0*   PROT 7.2      K 4.8   CO2 27      BUN 24*   CREATININE 4.6*   ALKPHOS 497*   ALT 27   AST 26   BILITOT 0.7     CRP: No results for input(s): CRP in the last 48 hours.  ESR: No results for input(s): POCESR, ERYTHROCYTES in the last 48 hours.  LFTs:   Recent Labs   Lab 05/23/19  1204   ALT 27   AST 26   ALKPHOS 497*   BILITOT 0.7   PROT 7.2   ALBUMIN 3.0*     Procalcitonin: No results for input(s): PROCAL in the last 48 hours.    Significant Diagnostics:  I have reviewed all pertinent imaging results/findings within the past 24 hours.    Assessment/Plan:     *  Hypertensive urgency  28F w/ PMH liver transplant in 91', thyroidectomy, ESRD (on HD MWF) and craniotomy for tumor 4/22 who [place] [facility] [place] and hypertension:    --Patient seen at bedside in ED  --All labs and diagnostics reviewed  --CTH stable from post-operative scan  --No operative management per NSGY  --Recommend Medicine consult for medical optimization of HTN  --Recommend dispo per ED  --Thank you for your consult, we will sign off        Thank you for your consult. I will sign off. Please contact us if you have any additional questions.    Diony Patel MD  Neurosurgery  [facility]

## 2019-05-24 NOTE — ASSESSMENT & PLAN NOTE
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    Presents with hypertensive urgency, w/ -200, despite multiple BP meds; reports compliance with all medications.  - CTH negative for acute intracranial process  - Received clonidine, hydralazine in ED  - At time of evaluation, reported improvement in her HA    Plan:  - Crit care consulted in ED, stable for floor. Plan for BP optimization  - Resumed home meds including isosorbide 90 mg, irbesartan 300 mg, coreg 25 BID, verapamil 240 mg qhs, hydralazine 100 mg q8h, clonidine 0.3 q8h  - Opthal consulted, will f/u outpatient.  - NSGY consulted: CTH stable from post-operative scan, No operative management, Recommend optimization of HTN  - Nephrology consulted for ESRD, d/w nephrology, patient has typically been getting 3 L UF, may require more if tolerates  - Will consider additional agents / replacements if no improvement with HD

## 2019-05-24 NOTE — PROGRESS NOTES
[facility] STAFF HEMODIALYSIS NOTE     Patient currently on hemodialysis for removal of uremic toxins and volume.     Patient seen and evaluated on hemodialysis, tolerating treatment, see HD flowsheet for vitals and assessments.      Ultrafiltration goal is 3L as tolerated, keep map >65      Labs have been reviewed and the dialysate bath has been adjusted.     Assessment/Plan:     -Patient seen on HD, tolerating treatment well, w/o complaints     -See NAYAN Wan NP's consult note for current nephrology POC     JANESSA Mueller, AGNP-C  Nephrology  Pager:  [phone]

## 2019-05-24 NOTE — CONSULTS
Patient evaluated by MICU team, please see full consult noted dated 5/23/19. Patient stable for admission to hospital medicine.    Neo Schultz MD  Internal Medicine PGY-3

## 2019-05-24 NOTE — PLAN OF CARE
Problem: Adult Inpatient Plan of Care  Goal: Plan of Care Review  Outcome: Ongoing (interventions implemented as appropriate)  Report from Zachery PABLO. Noted patient in bed awake, alert, and oriented. Denies pain and dyspnea. Board updated and patient encouraged to notify staff when she needs assistance. Denies questions at present. Bed in lowest position and call light within reach. Assessments per flow sheets. Will continue to monitor.

## 2019-05-24 NOTE — CARE UPDATE
Rapid Response Nurse Follow-up Note     Followed up with BEV Bingham on pt BP status. Reported pt received antihypertensives w/ minimal relief. No acute issues at this time. Beside awaiting call from MD. WHITE recommending parameters for BP.     Please call Rapid Response RN, Abimbola Camara RN with any questions or concerns at [phone].

## 2019-05-24 NOTE — ASSESSMENT & PLAN NOTE
28F w/ PMH liver transplant in 91', thyroidectomy, ESRD (on HD MWF) and craniotomy for tumor 4/22 who presents to [facility] ED for HA and hypertension:    --Patient seen at bedside in ED  --All labs and diagnostics reviewed  --CTH stable from post-operative scan  --No operative management per NSGY  --Recommend Medicine consult for medical optimization of HTN  --Recommend dispo per ED  --Thank you for your consult, we will sign off

## 2019-05-24 NOTE — NURSING
Pt arrived to room 623 with nurse, TOM X 4, denies pain . BP elevated 196/124, paged team, aware, verbalized by Dr. Steele. Needs acknowledged. Safety maintained, Family at the bedside. Will monitor.

## 2019-05-24 NOTE — CARE UPDATE
"RAPID RESPONSE NURSE PROACTIVE ROUNDING NOTE     Time of Visit:     Admit Date: 2019  LOS: 1  Code Status: Prior   Date of Visit: 2019  : 1990  Age: 28 y.o.  Sex: female  Race: Black or   Bed: [address] [place] [address] A:   MRN: [id number]  Was the patient discharged from an ICU this admission? no   Was the patient discharged from a PACU within last 24 hours?  no  Did the patient receive conscious sedation/general anesthesia in last 24 hours?  no  Was the patient in the ED within the past 24 hours?  yes  Was the patient started on NIPPV within the past 24 hours?  no  Attending Physician: Janeen Hill MD  Primary Service: [facility] MED 3    ASSESSMENT     Diagnosis: Hypertensive urgency    Abnormal Vital Signs: BP (!) 174/114   Pulse 79   Temp 98.4 °F (36.9 °C)   Resp 18   Ht 5' 4" (1.626 m)   Wt 59 kg (130 lb)   SpO2 (!) 94%   Breastfeeding? No   BMI 22.31 kg/m²      Clinical Issues: Circulatory    Patient  has a past medical history of Anemia in ESRD (end-stage renal disease), Chronic rejection of liver transplant, Depression, Encounter for blood transfusion, ESRD on hemodialysis, History of recent hospitalization, History of splenomegaly, Immunosuppressed, Iron deficiency anemia secondary to inadequate dietary iron intake, Liver replaced by transplant, Moderate protein-calorie malnutrition, MRSA bacteremia, Pneumonia, Prophylactic immunotherapy, Renovascular hypertension, Secondary hyperparathyroidism, Seizures, Sialadenitis, and Thrombocytopenia.    Upon entering pt sitting up in bed eating/ watching tv. Family at bedside including pt's mother whom reported pt suffers from HTN and takes multiple antihypertensives. Pt did receive all meds today. No c/o pain or any other discomforts. Pt current SBP in 190's. Other VSS.     INTERVENTIONS/ RECOMMENDATIONS     Per orders give scheduled antihypertensives. Critical care previously consulted.    Discussed plan of care with Zachery, " LPN.    PHYSICIAN ESCALATION     Yes/No  yes    Orders received from Dr. Steele and case discussed with bedside LPN    Disposition: Remain in room 623.    FOLLOW-UP     Call back the Rapid Response Nurse, Abimbola Camara RN at [phone] for additional questions or concerns.

## 2019-05-24 NOTE — HOSPITAL COURSE
Admitted to hospital medicine. Neurosurgery, Ophthalmology and critical care consulted. CTH was done which was negative for acute process. She evaluated by neurosurgery, who recommended BP optimization. Home blood pressure regimen was resumed with improvement in headache and blurry vision. Ophthalmology was consulted. Nephrology consulted for HD, and after discussion with them additional fluid was removed (3.5L, as opposed to her usual 3 L). Her new dry weight was documented as 53 kg. Overnight patient again became hypertensive with recurrence of symptoms, and was given her home hydralazine with improvement. Her home coreg was discontinued and she was started on labetalol, which was titrated up, with improvement in both blood pressure and symptoms. Patient was offered return to [facility] she [facility] from), but patient declined and wished to be discharged home with home health for continued PT. Her tacro dosing was discussed with hepatology and she was discharged with 5 mg tacro BID with repeat tacro levels Wednesday and Thursday and hepatology follow up.

## 2019-05-24 NOTE — SUBJECTIVE & OBJECTIVE
(Not in a hospital admission)    Review of patient's allergies indicates:   Allergen Reactions    Chloral hydrate Hallucinations     Other reaction(s): Hallucinations  Other reaction(s): Hives    Hydrocodone Other (See Comments)     Mental status changes    Tolerates oxycodone       Past Medical History:   Diagnosis Date    Anemia in ESRD (end-stage renal disease) 10/12/2015    dialysis tues, thursday, sat; access left arm    Chronic rejection of liver transplant 3/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 ()    Moderate protein-calorie malnutrition 2017    MRSA bacteremia 2017    Pneumonia     Prophylactic immunotherapy 2014    Renovascular hypertension 10/2/2015    Secondary hyperparathyroidism 2017    Seizures     Sialadenitis 3/21/2018    Thrombocytopenia 2016     Past Surgical History:   Procedure Laterality Date    BIOPSY, LIVER, TRANSJUGULAR APPROACH N/A 2018    Performed by [facility] OR [facility] FLR    BIOPSY-LIVER N/A 2017    [facility]    BIOPSY-LIVER N/A 3/22/2016    [facility]     SECTION      x 2    CONIZATION-CERVICAL-LEEP N/A 6/15/2018    Performed by [facility]am [facility]quin [facility] at [facility] [place] OR    [id number]-[id number]-CF; upper extremity Left 2015    Performed by Idalia Diaz MD at [facility] OR [facility] FLR    CRANIOPLASTY  2019    Performed by Jose Luis Powell MD at [facility] OR [facility] FLR    CRANIOTOMY-- left crani for clot evac with microscrope  Left 4/22/2019    Performed by [facility] at [facility] OR 2ND FLR    EMBOLIZATION, BLOOD VESSEL N/A 7/21/2018    Performed by [facility]ren Ramos [facility] at [facility] CATH LAB    Exam Under Anesthesia N/A 8/8/2018    Performed by [facility] at [facility] OR [facility]ND FLR    Exam under anesthesia N/A 6/19/2018    Performed by [facility]leam [facility]quin [facility] at [facility] OR    Exam under anesthesia (ADD ON ) N/A 7/9/2018    Performed by NICKIE Alvarez MD at [facility] OR    Exam under anesthesia -cervical suturing  N/A 7/26/2018    Performed by Lei Sims III, MD at [facility] OR    EXCISION, NEOPLASM, SKULL with Cranioplasty Left 4/22/2019    Performed by [facility] Powell MD at [facility] OR [facility]LR    FISTULOGRAM Left 12/4/2015    Performed by Idalia Diaz MD at [facility] CATH LAB    LIVER BIOPSY      LIVER TRANSPLANT  09/1992    PARATHYROIDECTOMY, Minimally Invasive Bilateral Exploration Bilateral 3/27/2019    Performed by Ashley Guallpa MD at [facility] OR [facility]ND FLR    SUTURE REPAIR,CERVIX  6/19/2018    Performed by [facility]am [facility]quin [facility] at [facility] OR    TUBAL LIGATION  2010     Family History     Problem Relation (Age of Onset)    Cancer Sister    Heart attack Maternal Uncle    Hypertension Mother, Father        Tobacco Use    Smoking status: Never Smoker    Smokeless tobacco: Never Used   Substance and Sexual Activity    Alcohol use: No    Drug use: No    Sexual activity: Never     Partners: Male     Review of Systems   Constitutional: Negative for activity change, chills, fatigue, fever and unexpected weight change.   HENT: Negative for tinnitus and voice change.    Eyes: Positive for visual disturbance. Negative for photophobia.   Respiratory: Negative for cough, choking, chest tightness, shortness of breath, wheezing and stridor.    Cardiovascular: Negative for chest pain and palpitations.   Gastrointestinal: Negative for abdominal distention, abdominal pain, constipation, diarrhea, nausea and vomiting.   Endocrine:  Negative for polydipsia, polyphagia and polyuria.   Genitourinary: Negative for difficulty urinating, dysuria, frequency, hematuria and urgency.   Musculoskeletal: Negative for back pain, gait problem and neck stiffness.   Skin: Negative for pallor, rash and wound.   Neurological: Positive for headaches. Negative for dizziness, tremors, seizures, syncope, facial asymmetry, speech difficulty, weakness, light-headedness and numbness.   Psychiatric/Behavioral: Negative for agitation, behavioral problems and confusion.     Objective:     Weight: 59 kg (130 lb)  Body mass index is 22.31 kg/m².  Vital Signs (Most Recent):  Temp: 98.4 °F (36.9 °C) (05/23/19 1342)  Pulse: 78 (05/23/19 1553)  Resp: 19 (05/23/19 1342)  BP: (!) 179/115 (05/23/19 1833)  SpO2: 100 % (05/23/19 1553) Vital Signs (24h Range):  Temp:  [98.1 °F (36.7 °C)-98.4 °F (36.9 °C)] 98.4 °F (36.9 °C)  Pulse:  [70-88] 78  Resp:  [16-20] 19  SpO2:  [98 %-100 %] 100 %  BP: (156-181)/(105-125) 179/115                        Hemodialysis AV Fistula Left forearm (Active)   Needle Size Buttonhole 5/20/2019  9:00 AM   Site Assessment Clean;Dry;Intact;No redness;No swelling 5/21/2019  4:00 AM   Patency Present;Thrill;Bruit 5/21/2019  4:00 AM   Status Deaccessed 5/21/2019  4:00 AM   Flows Good 5/20/2019  1:15 PM   Dressing Intervention New dressing 5/19/2019  8:00 AM   Dressing Status Clean;Dry;Intact 5/21/2019  4:00 AM   Site Condition No complications 5/21/2019  4:00 AM   Dressing Gauze 5/19/2019  8:00 AM   Drainage Description Other (Comment) 4/14/2018  5:26 PM     Neurosurgery Physical Exam  General: AOx3, GCS [id number]  CNII-XII: Intact on fine exam, PERRL, EOMi, facial sensation preserved, no facial assymetry, tongue/uvula/palate midline, shoulder shrug equal, no pronator drift  Extremities: 5/5 motor throughout, sensorium intact throughout, coordination intact throughout, DTRs 2+, no pathological reflexes, no sensory level present    Wound C/D/I, well  healing    Significant Labs:  Recent Labs   Lab 05/23/19  1204   *      K 4.8      CO2 27   BUN 24*   CREATININE 4.6*   CALCIUM 7.6*     Recent Labs   Lab 05/23/19  1204   WBC 2.39*   HGB 8.1*   HCT 25.2*   PLT 89*     Recent Labs   Lab 05/23/19  1204   INR 1.2     Microbiology Results (last 7 days)     ** No results found for the last 168 hours. **        ABGs: No results for input(s): PH, PCO2, PO2, HCO3, POCSATURATED, BE in the last 48 hours.  Cardiac markers: No results for input(s): CKMB, CPKMB, TROPONINT, TROPONINI, MYOGLOBIN in the last 48 hours.  CMP:   Recent Labs   Lab 05/23/19  1204   *   CALCIUM 7.6*   ALBUMIN 3.0*   PROT 7.2      K 4.8   CO2 27      BUN 24*   CREATININE 4.6*   ALKPHOS 497*   ALT 27   AST 26   BILITOT 0.7     CRP: No results for input(s): CRP in the last 48 hours.  ESR: No results for input(s): POCESR, ERYTHROCYTES in the last 48 hours.  LFTs:   Recent Labs   Lab 05/23/19  1204   ALT 27   AST 26   ALKPHOS 497*   BILITOT 0.7   PROT 7.2   ALBUMIN 3.0*     Procalcitonin: No results for input(s): PROCAL in the last 48 hours.    Significant Diagnostics:  I have reviewed all pertinent imaging results/findings within the past 24 hours.

## 2019-05-24 NOTE — CONSULTS
[facility]  Nephrology  Consult Note    Patient Name: Holly Patel  MRN: [id number]  Admission Date: 5/23/2019  Hospital Length of Stay: 1 days  Attending Provider: Janeen Hill MD   Primary Care Physician: Stan Sosa MD  Principal Problem:Hypertensive urgency    Inpatient consult to Nephrology  Consult performed by: SAM Haskins  Consult ordered by: Jacky Ross MD  Reason for consult: ESRD Management        Subjective:     HPI: 27 y/o female with PMHx ESRD on HD (TTS), HTN urgency, and HTN retinopathy presented with Hypertensive Urgency    History obtained from the patient and the medical chart    Past Medical History:   Diagnosis Date    Anemia in ESRD (end-stage renal disease) 10/12/2015    dialysis tues, thursday, sat; access left arm    Chronic rejection of liver transplant 3/22/2016    Depression     Encounter for blood transfusion     ESRD on hemodialysis 9/30/2015    History of recent hospitalization 05/2018    pneumonia    History of splenomegaly 4/12/2016    Immunosuppressed 8/5/2017    Iron deficiency anemia secondary to inadequate dietary iron intake 8/16/2017    She receives IV iron periodically at the Dialysis Center.   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)    Moderate protein-calorie malnutrition 8/16/2017    MRSA bacteremia 8/6/2017    Pneumonia     Prophylactic immunotherapy 8/4/2014    Renovascular hypertension 10/2/2015    Secondary hyperparathyroidism 8/5/2017    Seizures     Sialadenitis 3/21/2018    Thrombocytopenia 4/12/2016       Past Surgical History:   Procedure Laterality Date    BIOPSY, LIVER, TRANSJUGULAR APPROACH N/A 11/16/2018    Performed by [facility] OR [facility] FLR    BIOPSY-LIVER N/A 6/16/2017    [facility]    BIOPSY-LIVER N/A 3/22/2016    Performed by [facility] at [facility] OR [facility]  FLR     SECTION      x 2    CONIZATION-CERVICAL-LEEP N/A 6/15/2018    Performed by [facility]quin [facility] at [facility] OR    [id number]; upper extremity Left 2015    Performed by [facility]alia Diaz MD at [facility] OR 2ND FLR    CRANIOPLASTY  2019    Performed by Jose Luis Powell MD at [facility] OR 2ND FLR    CRANIOTOMY-- left crani for clot evac with microscrope Left 2019    Performed by Jose Luis Powell MD at [facility] OR 2ND FLR    EMBOLIZATION, BLOOD VESSEL N/A 2018    Performed by Aren Ramos MD at [facility] CATH LAB    Exam Under Anesthesia N/A 2018    Performed by [facility]am [facility]quin MD at [facility] OR 2ND FLR    Exam under anesthesia N/A 2018    Performed by [facility]am [facility]roquin MD at [facility] OR    Exam under anesthesia (ADD ON ) N/A 2018    Performed by NICKIE Alvarez MD at [facility] OR    Exam under anesthesia -cervical suturing  N/A 2018    Performed by Lei Sims III, MD at [facility] OR    EXCISION, NEOPLASM, SKULL with Cranioplasty Left 2019    Performed by Jose [facility] Powell MD at [facility] OR 2[facility] FLR    FISTULOGRAM Left 2015    Performed by Idalia Diaz MD at [facility] CATH LAB    LIVER BIOPSY      LIVER TRANSPLANT  1992    PARATHYROIDECTOMY, Minimally Invasive Bilateral Exploration Bilateral 3/27/2019    Performed by Ashley Guallpa MD at [facility] OR 2ND FLR    SUTURE REPAIR,CERVIX  2018    Performed by [facility]am Marroquin MD at [facility] OR    TUBAL LIGATION         Review of patient's allergies indicates:   Allergen Reactions    Chloral hydrate Hallucinations     Other reaction(s): Hallucinations  Other reaction(s): Hives    Hydrocodone Other (See Comments)     Mental status changes    Tolerates oxycodone     Current Facility-Administered Medications   Medication Frequency    carvedilol tablet 25 mg BID    cloNIDine tablet 0.3 mg Q8H    dextrose 50% injection 12.5 g PRN    dextrose 50% injection 25 g PRN    glucagon (human  recombinant) injection 1 mg PRN    glucose chewable tablet 16 g PRN    glucose chewable tablet 24 g PRN    hydrALAZINE tablet 100 mg Q8H    irbesartan tablet 300 mg QAM    isosorbide mononitrate 24 hr tablet 90 mg Daily    lacosamide tablet 50 mg BID    levETIRAcetam tablet 500 mg BID    pantoprazole EC tablet 40 mg Daily    polyethylene glycol packet 17 g BID    senna-docusate 8.6-50 mg per tablet 2 tablet Daily    sodium chloride 0.9% flush 10 mL PRN    spironolactone tablet 25 mg Daily    tacrolimus capsule 5 mg Daily    verapamil CR tablet 240 mg QHS     Family History     Problem Relation (Age of Onset)    Cancer Sister    Heart attack Maternal Uncle    Hypertension Mother, Father        Tobacco Use    Smoking status: Never Smoker    Smokeless tobacco: Never Used   Substance and Sexual Activity    Alcohol use: No    Drug use: No    Sexual activity: Never     Partners: Male     Review of Systems   Constitutional: Negative.    HENT: Negative.    Eyes:        Pt reports blurred vision yesterday that was relieved with eye drops last PM   Respiratory: Negative.    Cardiovascular: Negative.    Gastrointestinal: Negative.    Genitourinary: Negative.         Pt reports RRF   Musculoskeletal: Negative.    Skin: Negative.         Pt reports receives HD via L arm AVF   Neurological:        Pt reports presented with H/A yesterday and states that it has since resolved with med given last PM   Hematological: Negative.    Psychiatric/Behavioral: Negative.      Objective:     Vital Signs (Most Recent):  Temp: 97.8 °F (36.6 °C) (05/24/19 0828)  Pulse: 78 (05/24/19 0828)  Resp: 18 (05/24/19 0828)  BP: (!) 164/120 (05/24/19 0831)  SpO2: (!) 92 % (05/24/19 0828)  O2 Device (Oxygen Therapy): room air (05/24/19 0828) Vital Signs (24h Range):  Temp:  [97.4 °F (36.3 °C)-98.8 °F (37.1 °C)] 97.8 °F (36.6 °C)  Pulse:  [70-88] 78  Resp:  [16-20] 18  SpO2:  [92 %-100 %] 92 %  BP: ()/() 164/120     Weight: 59  kg (130 lb) (05/23/19 1007)  Body mass index is 22.31 kg/m².  Body surface area is 1.63 meters squared.    I/O last 3 completed shifts:  In: 200 [P.O.:200]  Out: 0     Physical Exam   Constitutional: She is oriented to person, place, and time. She appears well-developed and well-nourished.   HENT:   Head: Normocephalic and atraumatic.   Eyes: Pupils are equal, round, and reactive to light. Conjunctivae and EOM are normal.   Neck: Normal range of motion. Neck supple.   Cardiovascular: Normal rate, regular rhythm, normal heart sounds and intact distal pulses.   Pulmonary/Chest: Effort normal and breath sounds normal.   Abdominal: Soft. Bowel sounds are normal.   Musculoskeletal: Normal range of motion.   Neurological: She is alert and oriented to person, place, and time.   Skin: Skin is warm and dry. Capillary refill takes less than 2 seconds.   L arm AVF with +t/+b   Psychiatric: She has a normal mood and affect. Her behavior is normal. Judgment and thought content normal.   Nursing note and vitals reviewed.      Significant Labs:  All labs within the past 24 hours have been reviewed.    Significant Imaging:  Labs: Reviewed  Echo: Reviewed    Assessment/Plan:     * Hypertensive urgency  HD today  UF 3L as tolerated  Managed by [facility]    ESRD on hemodialysis  HD MWF while IP  Daily wts, strict I/Os, and chart  Meds to renal parameters  Obtain careplan from OP HD unit    Hep B sAg: neg 5/7/19  Anemia: Hgb >7; hold epogen  MBD: corrected Ca 8.22; Ca to be adjusted in dialysate bath; Ph 2.9;  4/23/19  Diet: renal; start nutritional supplement  HTN: BP goal <140/90; on antihypertensives; will attempt UF 3L as tolerated and assess need for add'l HD session tomorrow; managed by [facility]    [facility]  Dr. Bass  TTS  4 hours  AVF  Last HD Wed  RRF        Thank you for your consult. I will follow-up with patient. Please contact us if you have any additional questions.    Pura Wan,  FNP  Nephrology  [facility]

## 2019-05-24 NOTE — SUBJECTIVE & OBJECTIVE
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    BIOPSY-LIVER N/A 2017    [facility]    BIOPSY-LIVER N/A 3/22/2016    Performed by [facility] at [facility] OR [address] [place] [address]     SECTION      x 2    CONIZATION-CERVICAL-LEEP N/A 6/15/2018    Performed by Neelam Marroquin MD at [facility] OR    LXFWDOEXKRQN-TEROKES-UN; upper extremity Left 2015    [facility] at [facility]    CRANIOPLASTY  2019    [facility] by [facility] at [facility]    CRANIOTOMY-- left crani for clot evac with microscrope Left 2019    [facility] by [facility]    EMBOLIZATION, BLOOD VESSEL N/A 2018    Performed by Aren Ramos MD at [facility] CATH LAB    Exam Under Anesthesia N/A 2018    [facility]    Exam under  anesthesia N/A 6/19/2018    Performed by Neelam Marroquin MD at [facility] [place] [place]    Exam under anesthesia (ADD ON ) N/A 7/9/2018    Performed by NICKIE Alvarez MD at [facility] [place] [place]    Exam under anesthesia -cervical suturing  N/A 7/26/2018    Performed by Lei Sims III, MD at [facility] [place] [place]    EXCISION, NEOPLASM, SKULL with Cranioplasty Left 4/22/2019    Performed by Jose Luis Powell MD at [facility] OR [facility]    FISTULOGRAM Left 12/4/2015    Performed by Idalia Diaz MD at [facility] CATH LAB    LIVER BIOPSY      LIVER TRANSPLANT  09/1992    PARATHYROIDECTOMY, Minimally Invasive Bilateral Exploration Bilateral 3/27/2019    Performed by Ashley Guallpa MD at [facility] OR [facility]    SUTURE REPAIR,CERVIX  6/19/2018    Performed by Neelam Marroquin MD at [facility] [place] OR    TUBAL LIGATION  2010       Review of patient's allergies indicates:   Allergen Reactions    Chloral hydrate Hallucinations     Other reaction(s): Hallucinations  Other reaction(s): Hives    Hydrocodone Other (See Comments)     Mental status changes    Tolerates oxycodone       No current facility-administered medications on file prior to encounter.      Current Outpatient Medications on File Prior to Encounter   Medication Sig    bacitracin 500 unit/gram ointment Apply topically as needed.    bisacodyl (DULCOLAX) 10 mg Supp Place 1 suppository (10 mg total) rectally once daily.    carvedilol (COREG) 25 MG tablet Take 1 tablet (25 mg total) by mouth 2 (two) times daily.    cloNIDine (CATAPRES) 0.3 MG tablet Take 1 tablet (0.3 mg total) by mouth every 8 (eight) hours.    epoetin anca-epbx (RETACRIT) 3,000 unit/mL injection Inject 1 mL (3,000 Units total) into the skin every Mon, Wed, Fri.    hydrALAZINE (APRESOLINE) 100 MG tablet Take 1 tablet (100 mg total) by mouth every 8 (eight) hours.    irbesartan (AVAPRO) 300 MG tablet Take 1 tablet (300 mg total) by mouth every morning.    isosorbide mononitrate (IMDUR) 30 MG 24 hr tablet Take 3 tablets (90 mg  total) by mouth once daily.    lacosamide (VIMPAT) 50 mg Tab Take 1 tablet (50 mg total) by mouth 2 (two) times daily.    levETIRAcetam (KEPPRA) 500 MG Tab Take 1 tablet (500 mg total) by mouth 2 (two) times daily.    levETIRAcetam (KEPPRA) 500 MG Tab Take 1 tablet (500 mg total) by mouth every Mon, Wed, Fri.    ondansetron (ZOFRAN-ODT) 4 MG TbDL Take 1 tablet (4 mg total) by mouth every 6 (six) hours as needed.    oxyCODONE (ROXICODONE) 5 MG immediate release tablet Take 1 tablet (5 mg total) by mouth every 6 (six) hours as needed.    pantoprazole (PROTONIX) 40 MG tablet Take 1 tablet (40 mg total) by mouth once daily.    polyethylene glycol (GLYCOLAX) 17 gram PwPk Take 17 g by mouth 2 (two) times daily.    senna-docusate 8.6-50 mg (PERICOLACE) 8.6-50 mg per tablet Take 2 tablets by mouth once daily.    tacrolimus (PROGRAF) 5 MG Cap Take 1 capsule (5 mg total) by mouth once daily.    verapamil (CALAN-SR) 240 MG CR tablet Take 1 tablet (240 mg total) by mouth every evening.     Family History     Problem Relation (Age of Onset)    Cancer Sister    Heart attack Maternal Uncle    Hypertension Mother, Father        Tobacco Use    Smoking status: Never Smoker    Smokeless tobacco: Never Used   Substance and Sexual Activity    Alcohol use: No    Drug use: No    Sexual activity: Never     Partners: Male     Review of Systems   Constitutional: Negative for chills, diaphoresis and fatigue.   Eyes: Positive for visual disturbance (blurry).   Respiratory: Negative for cough, chest tightness and shortness of breath.    Cardiovascular: Negative for chest pain, palpitations and leg swelling.   Gastrointestinal: Negative for abdominal pain, constipation, diarrhea, nausea and vomiting.   Genitourinary: Positive for difficulty urinating.   Musculoskeletal: Negative for arthralgias, myalgias, neck pain and neck stiffness.   Skin: Negative for color change, pallor and rash.   Neurological: Negative for dizziness,  light-headedness, numbness and headaches.     Objective:     Vital Signs (Most Recent):  Temp: 97 °F (36.1 °C) (05/24/19 1124)  Pulse: 75 (05/24/19 1124)  Resp: 18 (05/24/19 1124)  BP: (!) 172/104 (05/24/19 1124)  SpO2: 95 % (05/24/19 1124) Vital Signs (24h Range):  Temp:  [97 °F (36.1 °C)-98.8 °F (37.1 °C)] 97 °F (36.1 °C)  Pulse:  [74-84] 75  Resp:  [16-18] 18  SpO2:  [92 %-100 %] 95 %  BP: ()/() 172/104     Weight: 59 kg (130 lb)  Body mass index is 22.31 kg/m².    Physical Exam   Constitutional: She is oriented to person, place, and time. She appears well-developed and well-nourished. No distress.   HENT:   Head: Normocephalic and atraumatic.   Eyes: Conjunctivae and EOM are normal. No scleral icterus.   Neck: Normal range of motion. Neck supple. No thyromegaly present.   Cardiovascular: Normal rate, regular rhythm and normal heart sounds. Exam reveals no gallop and no friction rub.   No murmur heard.  Pulmonary/Chest: Effort normal and breath sounds normal. No respiratory distress. She has no rales.   Abdominal: Soft. Bowel sounds are normal. She exhibits no distension. There is no tenderness.   Musculoskeletal: Normal range of motion. She exhibits no edema, tenderness or deformity.   Neurological: She is alert and oriented to person, place, and time. No cranial nerve deficit.   Skin: Skin is warm and dry. Capillary refill takes less than 2 seconds. She is not diaphoretic. No erythema. No pallor.         CRANIAL NERVES     CN III, IV, VI   Extraocular motions are normal.        Significant Labs:   CBC:   Recent Labs   Lab 05/23/19  1204   WBC 2.39*   HGB 8.1*   HCT 25.2*   PLT 89*     CMP:   Recent Labs   Lab 05/23/19  1204 05/24/19  0324    138   K 4.8 4.5    105   CO2 27 25   * 131*   BUN 24* 33*   CREATININE 4.6* 5.5*   CALCIUM 7.6* 7.1*   PROT 7.2  --    ALBUMIN 3.0* 2.6*   BILITOT 0.7  --    ALKPHOS 497*  --    AST 26  --    ALT 27  --    ANIONGAP 8 8   EGFRNONAA 12.1* 9.8*

## 2019-05-24 NOTE — PROGRESS NOTES
HD completed , kota well, net uf removed  3.5 liters . Post 53.0, this her new dry weight , achieved today, post B/P 163/73  Blood returned and needles pulled .

## 2019-05-24 NOTE — NURSING
Informed Dr. NICKIE Alfonso of patients current BP post administering 100 mg of oral hydralazine. Parameters given: if patient remain asymptomatic contact primary team for a systolic >200 and/or a diastolic >120. If patient becomes symptomatic or has an acute change in LOC, call immediately.

## 2019-05-24 NOTE — HPI
27 y/o female with PMHx ESRD on HD, HTN urgency, and HTN retinopathy presented with Hypertensive Urgency    History obtained from the patient and the medical chart

## 2019-05-24 NOTE — ASSESSMENT & PLAN NOTE
HD MWF while IP  Daily wts, strict I/Os, and chart  Meds to renal parameters  Obtain careplan from OP HD unit    Hep B sAg: neg 5/7/19  Anemia: Hgb >7; hold epogen  MBD: corrected Ca 8.22; Ca to be adjusted in dialysate bath; Ph 2.9;  4/23/19  Diet: renal; start nutritional supplement  HTN: BP goal <140/90; on antihypertensives; managed by Hospital Medicine    [facility]  Dr. Bass  4 hours  AVF  Last HD yest  RRF

## 2019-05-24 NOTE — CONSULTS
[facility]  Ophthalmology  Consult Note    Patient Name: Holly Patel  MRN: [id number]  Admission Date: 5/23/2019  Hospital Length of Stay: 0 days  Attending Provider: Lupe Etienne MD   Primary Care Physician: Stan Sosa MD  Principal Problem:Hypertensive urgency    Inpatient consult to Ophthalmology  Consult performed by: Diony Toth MD  Consult ordered by: Lesley Feliciano MD        Subjective:     Chief Complaint:  Blurriness of vision OU     HPI:   Holly Patel is an 28 y.o. female with a pmhx significant for intraosseous hemangioma s/p excision, ESRD on HD, liver transplant, and  epilepsy who c/o blurred vision both eyes x1mo. Found to be profoundly hypertensive to 181/123 on presentation to ED. With sx of severe headache to left temple. Pt describes blurriness of vision for over a month, prior to excision of left calvarial lesion and cranioplasty with clot evacuation 4/22/19. Was admitted from 4/22 to 5/21, here today from SNF. Also c/o some mild photophobia and flashes of light in both eyes. No floaters, eye pain, red eye, pain on eye movement.     POHx: denies glasses, contacts, trauma, injections, laser, gtts, surgery. Last DFE 11/2018, was seen for HTN retinopathy.     FOHx: possible glaucoma in mother.     Base Eye Exam     Visual Acuity (Snellen - Linear)       Right Left    Near sc 20/30 20/30          Tonometry (Tonopen, 7:39 PM)       Right Left    Pressure 15 16          Pupils       Pupils Dark Light Shape React APD    Right PERRL 5 3 Round Brisk None    Left PERRL 5 3 Round Brisk None          Visual Fields       Right Left    Restrictions Total superior temporal, inferior temporal deficiencies Total superior nasal, inferior nasal deficiencies          Extraocular Movement       Right Left     Full, Ortho Full, Ortho          Dilation     Both eyes:  2.5% phenylephrine, 1% tropicamide @ 7:39 PM            Slit Lamp and Fundus Exam     External Exam        Right Left    External Normal Normal          Slit Lamp Exam       Right Left    Lids/Lashes Normal Normal    Conjunctiva/Sclera White and quiet White and quiet    Cornea Clear Clear    Anterior Chamber Deep and quiet Deep and quiet    Iris Round and reactive Round and reactive    Lens Clear Clear    Vitreous Normal Normal          Fundus Exam       Right Left    Disc Normal CWS near nerve    C/D Ratio 0.35 0.35    Macula few hard exudates superior to fovea Normal    Vessels atten atten    Periphery Normal Normal              CT head non con today 05/23/2019: post-surgical change of left temporal craniotomy/cranioplasty with resolution of prev parenchymal hemorrhage. No new hemorrhage.     Assessment and Plan:     1. HTN retinopathy  - known hx of HTN retinopathy and has been followed in ophthalmology clinic at [facility], most recently 11/2018  - has hard exudates and CWS on exam, chronically high BP  - no papilledema, no macular edema  - with blurriness of vision in setting of uncontrolled HTN, [place] near sc 20/30 OU  - encourage good BP control    2. Right homonymous hemianopia  - seen on CVF today 5/23/19  - may be etiology of patient's blurriness of vision  - with hx left-sided clot evacuation from calvarium 4/22/19--consider hemispatial neglect  - will need HVF 24-2 as an outpatient  - CT head non-con today shows no new hemorrhage, no masses    Recommendations:  - BP control  - will re-establish care with outpatient for formal visual field testing    Diony Toth MD  Ophthalmology  [facility]

## 2019-05-25 NOTE — NURSING
Pt returned to floor 1725. BP remain elevated, C/O headache,  paged team, will give acetaminophen and reassess BP and update Dr. Hernandez.

## 2019-05-25 NOTE — ASSESSMENT & PLAN NOTE
S/p liver transplant 1991  Continue with tacro  Per hepatology no change in tacro dosing  Daily tacro levels

## 2019-05-25 NOTE — PLAN OF CARE
Problem: Adult Inpatient Plan of Care  Goal: Plan of Care Review  Outcome: Ongoing (interventions implemented as appropriate)     05/25/19 0515   Plan of Care Review   Plan of Care Reviewed With patient     Pt BP remain elevated. Throughout the shift Dr. Hernandez been updated about Pt's BP.Schedule  Hydralazine given at this time. Will keep monitoring BP. Denies any other discomfort or headache, blurred  Vision. Safety maintained. Will monitor.

## 2019-05-25 NOTE — PLAN OF CARE
PT IN DIALYSIS AT THIS TIME. WILL ATTEMPT TO COMPLETE D/C ASSESSMENT        05/24/19 5406   Discharge Assessment   Assessment Type Discharge Planning Assessment   Assessment information obtained from? Medical Record

## 2019-05-25 NOTE — PROGRESS NOTES
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      Patient was admitted to [facility] from 4/22/2019 to 5/21/19 (29 days).  She originally was admitted to [facility] for excision of L calvarial lesion and cranioplasty with clot evaculation on 4/22. She was continued on vimpat and keppra. She is to receive an additional 500 mg dose of keppra after HD on HD days. The patient had issues with thrombocytopenia post operatively. She has had issues with chronic thrombocytopenia but due to her new neurosurgery, she was transfused platelets to maintain a goal of > 60. She was stepped down to medicine eventually. Most of her issues post operatively were related to her BP and platelets. Heme onc was consulted to assist with her thrombocytopenia and eventually she was given dexamethasone 40 mg daily x 4 days and her platelets did respond. Initially the thought was that she may need radiation or splenic embolization if this fails but the need was negated as her platelets responded. Neurosurgery final recs was for platelet goal > 40k. Her BP continued to be challenging and this was discussed with cardiology and with nephrology. Cardiology recommended that nephrology manage her BPs as she is ESRD. Nephrology stated that the patient suffers with poorly controlled BP and will try to see if there are  "any other options but for now to continue medical management. Imdur was added to her regimen but she still maintained elevated but entirely asymptomatic. She will follow up with NS as scheduled. She worked with [facility] and was recommended for rehab. She was accepted to [facility].       On the evening of 5/22, patient states that she developed a HA and high BP in the [facility].  She states that her BP started "acting crazy" and then she noticed she had a HA. The pain is sharp and throbbing and is localized to her left parietal/temporal area without radiation to other areas. Nothing seems to improve her headache or high blood pressure  She also states that she has had blurred vision since her surgery during her last hospital stay.  She takes her BP medicine at home: carvedilol, clonidine, hydralazine, irbesartan and isosorbide mononitrate.  Patient states her last seizure was 1 month ago.     When the patient arrived to the ED, BP was 181/123.  ICU was consulted in the ED, and state patient is stable for hospital medicine.     Hospital Course:  Admitted to hospital medicine. Neurosurgery, Ophthalmology and critical care consulted. CTH negative. Home blood pressure regimen resumed with improvement in headache and blurry vision. Nephrology consulted for HD.  05/24/2019 Overnight patient  and given home hydralazine dose with improvement. This am reports resolution of HA, and feels like blurry vision somewhat improved.  05/25/2019 Underwent HD with 3.5 UF; documented dry weight of 53kg, /73 following HD. Subsequently went up overnight with HA (200 SBP). When seen in the am, patient denied HA, SBP 170s.     Interval History: See hospital course    Review of Systems   Constitutional: Negative for chills, diaphoresis and fatigue.   Eyes: Positive for visual disturbance (blurry).   Respiratory: Negative for cough, chest tightness and shortness of breath.    Cardiovascular: Negative for chest pain, palpitations and " leg swelling.   Gastrointestinal: Negative for abdominal pain, constipation, diarrhea, nausea and vomiting.   Genitourinary: Positive for difficulty urinating.   Musculoskeletal: Negative for arthralgias, myalgias, neck pain and neck stiffness.   Skin: Negative for color change, pallor and rash.   Neurological: Negative for dizziness, light-headedness, numbness and headaches.     Objective:     Vital Signs (Most Recent):  Temp: 98 °F (36.7 °C) (05/25/19 1202)  Pulse: 77 (05/25/19 1225)  Resp: 18 (05/25/19 1225)  BP: (!) 156/101 (05/25/19 1225)  SpO2: 100 % (05/25/19 1225) Vital Signs (24h Range):  Temp:  [97.6 °F (36.4 °C)-98.7 °F (37.1 °C)] 98 °F (36.7 °C)  Pulse:  [61-79] 77  Resp:  [16-20] 18  SpO2:  [93 %-100 %] 100 %  BP: (156-230)/() 156/101     Weight: 59 kg (130 lb)  Body mass index is 22.31 kg/m².    Intake/Output Summary (Last 24 hours) at 5/25/2019 1323  Last data filed at 5/25/2019 1111  Gross per 24 hour   Intake 570 ml   Output 4000 ml   Net -3430 ml      Physical Exam   Constitutional: She is oriented to person, place, and time. She appears well-developed and well-nourished. No distress.   HENT:   Head: Normocephalic and atraumatic.   Eyes: Conjunctivae and EOM are normal. No scleral icterus.   Neck: Normal range of motion. Neck supple. No thyromegaly present.   Cardiovascular: Normal rate, regular rhythm and normal heart sounds. Exam reveals no gallop and no friction rub.   No murmur heard.  Pulmonary/Chest: Effort normal and breath sounds normal. No respiratory distress. She has no rales.   Abdominal: Soft. Bowel sounds are normal. She exhibits no distension. There is no tenderness.   Musculoskeletal: Normal range of motion. She exhibits no edema, tenderness or deformity.   Neurological: She is alert and oriented to person, place, and time. No cranial nerve deficit.   Skin: Skin is warm and dry. Capillary refill takes less than 2 seconds. She is not diaphoretic. No erythema. No pallor.          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    Presents with hypertensive urgency, w/ -200, despite multiple BP meds; reports compliance with all medications.  - CTH negative for acute intracranial process  - Received clonidine, hydralazine in ED  - At time of evaluation, reported improvement in her HA    Plan:  - Crit care consulted in ED, stable for floor. Plan for BP optimization  - Resumed home meds including isosorbide 90 mg, irbesartan 300 mg, coreg 25 BID, verapamil 240 mg qhs, hydralazine 100 mg q8h, clonidine 0.3 q8h  - Switched coreg --> labetalol. F/u control  - Opthal consulted, will f/u outpatient.  - NSGY consulted: CTH stable from post-operative scan, No operative management, Recommend optimization of HTN  - Nephrology consulted for ESRD, d/w nephrology, patient has typically been getting 3 L UF, may require more if tolerates  - Will consider additional agents / replacements if no improvement with HD    Discharge planning issues  Likely discharge back to [facility] when medically ready    S/P craniotomy  s/p intraosseous hemangioma s/p excision  CTH negative  Neurosurgery consulted    Seizures  Continue with vimpat and keppra    Immunosuppressed  See liver tx    Anemia in ESRD (end-stage renal disease)  2/2 ESRD. On EPO  Nephrology consulted    Renovascular hypertension  See HTN urgency    ESRD on hemodialysis  ESRD [facility]  Nephrology consulted  Renal diet    Liver replaced by transplant  S/p liver transplant 1991  Continue with tacro  Per hepatology no change in tacro dosing  Daily tacro levels    VTE Risk Mitigation (From admission, onward)        Ordered     IP VTE LOW RISK PATIENT  Once       05/23/19 1809     Place sequential compression device  Until discontinued      05/23/19 1809          Jacky Ross MD  Department of Hospital Medicine   [facility]

## 2019-05-25 NOTE — NURSING
Updated Dr. Hernandez in person about Pt /116, Schedule clonidine and Hydralazine given this time , Will monitor.

## 2019-05-25 NOTE — SUBJECTIVE & OBJECTIVE
Interval History: See hospital course    Review of Systems   Constitutional: Negative for chills, diaphoresis and fatigue.   Eyes: Positive for visual disturbance (blurry).   Respiratory: Negative for cough, chest tightness and shortness of breath.    Cardiovascular: Negative for chest pain, palpitations and leg swelling.   Gastrointestinal: Negative for abdominal pain, constipation, diarrhea, nausea and vomiting.   Genitourinary: Positive for difficulty urinating.   Musculoskeletal: Negative for arthralgias, myalgias, neck pain and neck stiffness.   Skin: Negative for color change, pallor and rash.   Neurological: Negative for dizziness, light-headedness, numbness and headaches.     Objective:     Vital Signs (Most Recent):  Temp: 98 °F (36.7 °C) (05/25/19 1202)  Pulse: 77 (05/25/19 1225)  Resp: 18 (05/25/19 1225)  BP: (!) 156/101 (05/25/19 1225)  SpO2: 100 % (05/25/19 1225) Vital Signs (24h Range):  Temp:  [97.6 °F (36.4 °C)-98.7 °F (37.1 °C)] 98 °F (36.7 °C)  Pulse:  [61-79] 77  Resp:  [16-20] 18  SpO2:  [93 %-100 %] 100 %  BP: (156-230)/() 156/101     Weight: 59 kg (130 lb)  Body mass index is 22.31 kg/m².    Intake/Output Summary (Last 24 hours) at 5/25/2019 1323  Last data filed at 5/25/2019 1111  Gross per 24 hour   Intake 570 ml   Output 4000 ml   Net -3430 ml      Physical Exam   Constitutional: She is oriented to person, place, and time. She appears well-developed and well-nourished. No distress.   HENT:   Head: Normocephalic and atraumatic.   Eyes: Conjunctivae and EOM are normal. No scleral icterus.   Neck: Normal range of motion. Neck supple. No thyromegaly present.   Cardiovascular: Normal rate, regular rhythm and normal heart sounds. Exam reveals no gallop and no friction rub.   No murmur heard.  Pulmonary/Chest: Effort normal and breath sounds normal. No respiratory distress. She has no rales.   Abdominal: Soft. Bowel sounds are normal. She exhibits no distension. There is no tenderness.    Musculoskeletal: Normal range of motion. She exhibits no edema, tenderness or deformity.   Neurological: She is alert and oriented to person, place, and time. No cranial nerve deficit.   Skin: Skin is warm and dry. Capillary refill takes less than 2 seconds. She is not diaphoretic. No erythema. No pallor.

## 2019-05-25 NOTE — ASSESSMENT & PLAN NOTE
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    Presents with hypertensive urgency, w/ -200, despite multiple BP meds; reports compliance with all medications.  - CTH negative for acute intracranial process  - Received clonidine, hydralazine in ED  - At time of evaluation, reported improvement in her HA    Plan:  - Crit care consulted in ED, stable for floor. Plan for BP optimization  - Resumed home meds including isosorbide 90 mg, irbesartan 300 mg, coreg 25 BID, verapamil 240 mg qhs, hydralazine 100 mg q8h, clonidine 0.3 q8h  - Switched coreg --> labetalol. F/u control  - Opthal consulted, will f/u outpatient.  - NSGY consulted: CTH stable from post-operative scan, No operative management, Recommend optimization of HTN  - Nephrology consulted for ESRD, d/w nephrology, patient has typically been getting 3 L UF, may require more if tolerates  - Will consider additional agents / replacements if no improvement with HD

## 2019-05-26 NOTE — PLAN OF CARE
Problem: Adult Inpatient Plan of Care  Goal: Plan of Care Review  Outcome: Ongoing (interventions implemented as appropriate)     05/26/19 0562   Plan of Care Review   Plan of Care Reviewed With patient     Pt remain free from fall and injuries. No significant changes overnight, BP remain elevated. Meds given as ordered, tolerated well. Safety maintained. Will monitor.

## 2019-05-26 NOTE — ASSESSMENT & PLAN NOTE
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    Presents with hypertensive urgency, w/ -200, despite multiple BP meds; reports compliance with all medications.  - CTH negative for acute intracranial process  - Received clonidine, hydralazine in ED  - At time of evaluation, reported improvement in her HA    Plan:  - Crit care consulted in ED, stable for floor. Plan for BP optimization  - Resumed home meds including isosorbide 90 mg, irbesartan 300 mg, coreg 25 BID, verapamil 240 mg qhs, hydralazine 100 mg q8h, clonidine 0.3 q8h  - Opthal consulted, will f/u outpatient.  - NSGY consulted: CTH stable from post-operative scan, No operative management, Recommend optimization of HTN  - Nephrology consulted for ESRD, d/w nephrology, patient has typically been getting 3 L UF, may require more if tolerates  - Will consider additional agents / replacements if no improvement with HD  - Switched coreg --> labetalol. 5/25  -increased labetalol from 200 BID to 300 TID on 5/26

## 2019-05-26 NOTE — SUBJECTIVE & OBJECTIVE
Interval History: NAEON. Patients /117, labetalol increased from 200 BID too 300 TID.    Review of Systems   Constitutional: Negative for chills, diaphoresis and fatigue.   Eyes: Negative for visual disturbance.   Respiratory: Negative for cough, chest tightness and shortness of breath.    Cardiovascular: Negative for chest pain, palpitations and leg swelling.   Gastrointestinal: Negative for abdominal pain, constipation, diarrhea, nausea and vomiting.   Genitourinary: Positive for difficulty urinating.   Musculoskeletal: Negative for arthralgias, myalgias, neck pain and neck stiffness.   Skin: Negative for color change, pallor and rash.   Neurological: Negative for dizziness, light-headedness, numbness and headaches.     Objective:     Vital Signs (Most Recent):  Temp: 97.4 °F (36.3 °C) (05/26/19 1156)  Pulse: 78 (05/26/19 1156)  Resp: 20 (05/26/19 1156)  BP: (!) 147/90 (05/26/19 1156)  SpO2: 100 % (05/26/19 1156) Vital Signs (24h Range):  Temp:  [96.4 °F (35.8 °C)-98.2 °F (36.8 °C)] 97.4 °F (36.3 °C)  Pulse:  [71-81] 78  Resp:  [16-20] 20  SpO2:  [93 %-100 %] 100 %  BP: (139-193)/() 147/90     Weight: 59.3 kg (130 lb 11.7 oz)  Body mass index is 22.44 kg/m².    Intake/Output Summary (Last 24 hours) at 5/26/2019 1157  Last data filed at 5/26/2019 0900  Gross per 24 hour   Intake 590 ml   Output 0 ml   Net 590 ml      Physical Exam   Constitutional: She is oriented to person, place, and time. She appears well-developed and well-nourished. No distress.   HENT:   Head: Normocephalic and atraumatic.   Eyes: Conjunctivae and EOM are normal. No scleral icterus.   Neck: Normal range of motion. Neck supple. No thyromegaly present.   Cardiovascular: Normal rate, regular rhythm and normal heart sounds. Exam reveals no gallop and no friction rub.   No murmur heard.  Pulmonary/Chest: Effort normal and breath sounds normal. No respiratory distress. She has no rales.   Abdominal: Soft. Bowel sounds are normal. She  exhibits no distension. There is no tenderness.   Musculoskeletal: Normal range of motion. She exhibits no edema, tenderness or deformity.   Neurological: She is alert and oriented to person, place, and time. No cranial nerve deficit.   Skin: Skin is warm and dry. Capillary refill takes less than 2 seconds. She is not diaphoretic. No erythema. No pallor.

## 2019-05-26 NOTE — PLAN OF CARE
Problem: Adult Inpatient Plan of Care  Goal: Plan of Care Review  Outcome: Ongoing (interventions implemented as appropriate)  Patient alert and oriented. Able to make needs known. Denied pain. Monitoring blood pressure and tolerated all medications well. No dialysis today. No fall or occurrence today. Mother visited and family visited. Did bathe self with moms assist in bathroom. Sat up most of day. Did leave unit in wheelchair for short while and returned to unit with mother . She stated she needed a change of scenery. Ate well. Monitored her fluids. Assessment on going.

## 2019-05-26 NOTE — PROGRESS NOTES
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      Patient was admitted to [facility] from 4/22/2019 to 5/21/19 (29 days).  She originally was admitted to [facility] for excision of L calvarial lesion and cranioplasty with clot evaculation on 4/22. She was continued on vimpat and keppra. She is to receive an additional 500 mg dose of keppra after HD on HD days. The patient had issues with thrombocytopenia post operatively. She has had issues with chronic thrombocytopenia but due to her new neurosurgery, she was transfused platelets to maintain a goal of > 60. She was stepped down to medicine eventually. Most of her issues post operatively were related to her BP and platelets. Heme onc was consulted to assist with her thrombocytopenia and eventually she was given dexamethasone 40 mg daily x 4 days and her platelets did respond. Initially the thought was that she may need radiation or splenic embolization if this fails but the need was negated as her platelets responded. Neurosurgery final recs was for platelet goal > 40k. Her BP continued to be challenging and this was discussed with cardiology and with nephrology. Cardiology recommended that nephrology manage her BPs as she is ESRD. Nephrology stated that the patient suffers with poorly controlled BP and will try to see if there are any  "other options but for now to continue medical management. Imdur was added to her regimen but she still maintained elevated but entirely asymptomatic. She will follow up with NS as scheduled. She worked with [facility] and was recommended for rehab. She was accepted to [facility].       On the evening of 5/22, patient states that she developed a HA and high BP in the [facility].  She states that her BP started "acting crazy" and then she noticed she had a HA. The pain is sharp and throbbing and is localized to her left parietal/temporal area without radiation to other areas. Nothing seems to improve her headache or high blood pressure  She also states that she has had blurred vision since her surgery during her last hospital stay.  She takes her BP medicine at home: carvedilol, clonidine, hydralazine, irbesartan and isosorbide mononitrate.  Patient states her last seizure was 1 month ago.     When the patient arrived to the ED, BP was 181/123.  ICU was consulted in the ED, and state patient is stable for hospital medicine.     Hospital Course:  Admitted to hospital medicine. Neurosurgery, Ophthalmology and critical care consulted. CTH negative. Home blood pressure regimen resumed with improvement in headache and blurry vision. Nephrology consulted for HD.  05/24/2019 Overnight patient  and given home hydralazine dose with improvement. This am reports resolution of HA, and feels like blurry vision somewhat improved.  05/25/2019 Underwent HD with 3.5 UF; documented dry weight of 53kg, /73 following HD. Subsequently went up overnight with HA (200 SBP). When seen in the am, patient denied HA, SBP 170s.   05/26/2019 NAEON. Patients /117, labetalol increased from 200 BID too 300 TID.    Interval History: NAEON. Patients /117, labetalol increased from 200 BID too 300 TID.    Review of Systems   Constitutional: Negative for chills, diaphoresis and fatigue.   Eyes: Negative for visual disturbance. "   Respiratory: Negative for cough, chest tightness and shortness of breath.    Cardiovascular: Negative for chest pain, palpitations and leg swelling.   Gastrointestinal: Negative for abdominal pain, constipation, diarrhea, nausea and vomiting.   Genitourinary: Positive for difficulty urinating.   Musculoskeletal: Negative for arthralgias, myalgias, neck pain and neck stiffness.   Skin: Negative for color change, pallor and rash.   Neurological: Negative for dizziness, light-headedness, numbness and headaches.     Objective:     Vital Signs (Most Recent):  Temp: 97.4 °F (36.3 °C) (05/26/19 1156)  Pulse: 78 (05/26/19 1156)  Resp: 20 (05/26/19 1156)  BP: (!) 147/90 (05/26/19 1156)  SpO2: 100 % (05/26/19 1156) Vital Signs (24h Range):  Temp:  [96.4 °F (35.8 °C)-98.2 °F (36.8 °C)] 97.4 °F (36.3 °C)  Pulse:  [71-81] 78  Resp:  [16-20] 20  SpO2:  [93 %-100 %] 100 %  BP: (139-193)/() 147/90     Weight: 59.3 kg (130 lb 11.7 oz)  Body mass index is 22.44 kg/m².    Intake/Output Summary (Last 24 hours) at 5/26/2019 1157  Last data filed at 5/26/2019 0900  Gross per 24 hour   Intake 590 ml   Output 0 ml   Net 590 ml      Physical Exam   Constitutional: She is oriented to person, place, and time. She appears well-developed and well-nourished. No distress.   HENT:   Head: Normocephalic and atraumatic.   Eyes: Conjunctivae and EOM are normal. No scleral icterus.   Neck: Normal range of motion. Neck supple. No thyromegaly present.   Cardiovascular: Normal rate, regular rhythm and normal heart sounds. Exam reveals no gallop and no friction rub.   No murmur heard.  Pulmonary/Chest: Effort normal and breath sounds normal. No respiratory distress. She has no rales.   Abdominal: Soft. Bowel sounds are normal. She exhibits no distension. There is no tenderness.   Musculoskeletal: Normal range of motion. She exhibits no edema, tenderness or deformity.   Neurological: She is alert and oriented to person, place, and time. No cranial  nerve deficit.   Skin: Skin is warm and dry. Capillary refill takes less than 2 seconds. She is not diaphoretic. No erythema. No pallor.         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    Presents with hypertensive urgency, w/ -200, despite multiple BP meds; reports compliance with all medications.  - CTH negative for acute intracranial process  - Received clonidine, hydralazine in ED  - At time of evaluation, reported improvement in her HA    Plan:  - Crit care consulted in ED, stable for floor. Plan for BP optimization  - Resumed home meds including isosorbide 90 mg, irbesartan 300 mg, coreg 25 BID, verapamil 240 mg qhs, hydralazine 100 mg q8h, clonidine 0.3 q8h  - Opthal consulted, will f/u outpatient.  - NSGY consulted: CTH stable from post-operative scan, No operative management, Recommend optimization of HTN  - Nephrology consulted for ESRD, d/w nephrology, patient has typically been getting 3 L UF, may require more if tolerates  - Will consider additional agents / replacements if no improvement with HD  - Switched coreg --> labetalol. 5/25  -increased labetalol from 200 BID to 300 TID on 5/26    Discharge planning issues  Patient wishes to go home when medically ready  May benefit from Home health    S/P craniotomy  s/p intraosseous hemangioma s/p excision  CTH negative  Neurosurgery consulted    Seizures  Continue with vimpat and keppra    Immunosuppressed  See liver tx    Anemia in ESRD (end-stage renal disease)  2/2 ESRD. On EPO  Nephrology consulted, appreciate help with HD    Renovascular hypertension  See HTN urgency    ESRD on hemodialysis  ESRD MWF  Nephrology consulted  Renal  diet    Liver replaced by transplant  S/p liver transplant 1991  Continue with tacro  Per hepatology no change in tacro dosing  Daily tacro levels      VTE Risk Mitigation (From admission, onward)        Ordered     IP VTE LOW RISK PATIENT  Once      05/23/19 1809     Place sequential compression device  Until discontinued      05/23/19 1809              Lesley Feliciano MD  Department of Hospital Medicine   [facility]

## 2019-05-27 NOTE — PLAN OF CARE
05/27/19 1544   Final Note   Assessment Type Final Discharge Note   Anticipated Discharge Disposition Home-Health   What phone number can be called within the next 1-3 days to see how you are doing after discharge? [phone]   Hospital Follow Up  Appt(s) scheduled? Yes   Discharge plans and expectations educations in teach back method with documentation complete? Yes   Discharge/Hospital Encounter Summary to ([facility]) PCP Yes   Referral to / orders for Home Health Complete? Yes   Did you assess the readiness or willingness of the family or caregiver to support self management of care? Yes   Right Care Referral Info   Post Acute Recommendation Home-care

## 2019-05-27 NOTE — DISCHARGE SUMMARY
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      Patient was admitted to [facility] from 4/22/2019 to 5/21/19 (29 days).  She originally was admitted to [facility] for excision of L calvarial lesion and cranioplasty with clot evaculation on 4/22. She was continued on vimpat and keppra. She is to receive an additional 500 mg dose of keppra after HD on HD days. The patient had issues with thrombocytopenia post operatively. She has had issues with chronic thrombocytopenia but due to her new neurosurgery, she was transfused platelets to maintain a goal of > 60. She was stepped down to medicine eventually. Most of her issues post operatively were related to her BP and platelets. Heme onc was consulted to assist with her thrombocytopenia and eventually she was given dexamethasone 40 mg daily x 4 days and her platelets did respond. Initially the thought was that she may need radiation or splenic embolization if this fails but the need was negated as her platelets responded. Neurosurgery final recs was for platelet goal > 40k. Her BP continued to be challenging and this was discussed with cardiology and with nephrology. Cardiology recommended that nephrology manage her BPs as she is ESRD. Nephrology stated that the patient suffers with poorly controlled BP and will try  "to see if there are any other options but for now to continue medical management. Imdur was added to her regimen but she still maintained elevated but entirely asymptomatic. She will follow up with NS as scheduled. She worked with [facility] and was recommended for rehab. She was accepted to [facility].       On the evening of 5/22, patient states that she developed a HA and high BP in the [facility].  She states that her BP started "acting crazy" and then she noticed she had a HA. The pain is sharp and throbbing and is localized to her left parietal/temporal area without radiation to other areas. Nothing seems to improve her headache or high blood pressure  She also states that she has had blurred vision since her surgery during her last hospital stay.  She takes her BP medicine at home: carvedilol, clonidine, hydralazine, irbesartan and isosorbide mononitrate.  Patient states her last seizure was 1 month ago.     When the patient arrived to the ED, BP was 181/123.  ICU was consulted in the ED, and state patient is stable for hospital medicine.     * No surgery found *      Hospital Course:   Admitted to hospital medicine. Neurosurgery, Ophthalmology and critical care consulted. CTH was done which was negative for acute process. She evaluated by neurosurgery, who recommended BP optimization. Home blood pressure regimen was resumed with improvement in headache and blurry vision. Ophthalmology was consulted. Nephrology consulted for HD, and after discussion with them additional fluid was removed (3.5L, as opposed to her usual 3 L). Her new dry weight was documented as 53 kg. Overnight patient again became hypertensive with recurrence of symptoms, and was given her home hydralazine with improvement. Her home coreg was discontinued and she was started on labetalol, which was titrated up, with improvement in both blood pressure and symptoms. Patient was offered return to [facility] (where she was [facility]), but patient declined " and wished to be discharged home with home health for continued PT. Her tacro dosing was discussed with hepatology and she was discharged with 5 mg tacro BID with repeat tacro levels Wednesday and Thursday and hepatology follow up.         Review of Systems   Constitutional: Negative for chills, diaphoresis and fatigue.   Eyes: Negative for visual disturbance.   Respiratory: Negative for cough, chest tightness and shortness of breath.    Cardiovascular: Negative for chest pain, palpitations and leg swelling.   Gastrointestinal: Negative for abdominal pain, constipation, diarrhea, nausea and vomiting.    Musculoskeletal: Negative for arthralgias, myalgias, neck pain and neck stiffness.   Skin: Negative for color change, pallor and rash.   Neurological: Negative for dizziness, light-headedness, numbness and headaches.     Physical Exam   Constitutional: She is oriented to person, place, and time. She appears well-developed and well-nourished. No distress.   HENT:   Head: Normocephalic and atraumatic.   Eyes: Conjunctivae and EOM are normal. No scleral icterus.   Neck: Normal range of motion. Neck supple. No thyromegaly present.   Cardiovascular: Normal rate, regular rhythm and normal heart sounds. Exam reveals no gallop and no friction rub.   No murmur heard.  Pulmonary/Chest: Effort normal and breath sounds normal. No respiratory distress. She has no rales.   Abdominal: Soft. Bowel sounds are normal. She exhibits no distension. There is no tenderness.   Musculoskeletal: Normal range of motion. She exhibits no edema, tenderness or deformity.   Neurological: She is alert and oriented to person, place, and time. No cranial nerve deficit.   Skin: Skin is warm and dry. Capillary refill takes less than 2 seconds. She is not diaphoretic. No erythema. No pallor.     Consults:   Consults (From admission, onward)        Status Ordering Provider     Inpatient consult to Critical Care Medicine  Once     Provider:  (Not yet  assigned)    Completed SHAMA GUAJARDO     Inpatient consult to Nephrology  Once     Provider:  (Not yet assigned)    Completed SUNDAY BROWNE     Inpatient consult to Neurosurgery  Once     Provider:  (Not yet assigned)    Acknowledged SHAMA GUAJARDO     Inpatient consult to Ophthalmology  Once     Provider:  (Not yet assigned)    Completed JACOBY DOW          No new Assessment & Plan notes have been filed under this hospital service since the last note was generated.  Service: Hospital Medicine    Final Active Diagnoses:    Diagnosis Date Noted POA    PRINCIPAL PROBLEM:  Hypertensive urgency [I16.0] 10/06/2014 Yes    Discharge planning issues [Z02.9] 05/23/2019 Not Applicable    S/P craniotomy [Z98.890] 05/06/2019 Not Applicable    Seizures [R56.9]  Yes    Immunosuppressed [D89.9] 08/05/2017 Yes     Chronic    Anemia in ESRD (end-stage renal disease) [N18.6, D63.1] 10/12/2015 Yes     Chronic    Renovascular hypertension [I15.0] 10/02/2015 Yes     Chronic    ESRD on hemodialysis [N18.6, Z99.2] 09/30/2015 Not Applicable     Chronic    Liver replaced by transplant [Z94.4] 09/10/2012 Not Applicable     Chronic      Problems Resolved During this Admission:       Discharged Condition: good    Disposition: Home-Health Care [facility]    Follow Up:  Follow-up Information     Stan Sosa MD.    Specialty:  Internal Medicine  Contact information:  [address]  [place] [address]  [phone]             [facility] - Hepatology.    Specialty:  Hepatology  Contact information:  [address] [facility] [address] [phone]  Additional information:  1st Floor - [facility]               Patient Instructions:      Ambulatory Referral to Hepatology   Referral Priority: Routine Referral Type: Consultation   Referral Reason: Specialty Services Required   Requested Specialty: Hepatology   Number of Visits  Requested: 1     Diet renal     Notify your health care provider if you experience any of the following:  temperature >100.4     Notify your health care provider if you experience any of the following:  persistent nausea and vomiting or diarrhea     Notify your health care provider if you experience any of the following:  severe uncontrolled pain     Notify your health care provider if you experience any of the following:  difficulty breathing or increased cough     Notify your health care provider if you experience any of the following:  severe persistent headache     Notify your health care provider if you experience any of the following:  persistent dizziness, light-headedness, or visual disturbances     Notify your health care provider if you experience any of the following:  increased confusion or weakness     Activity as tolerated         Pending Diagnostic Studies:     None         Medications:  Reconciled Home Medications:      Medication List      START taking these medications    labetalol 300 MG tablet  Commonly known as:  NORMODYNE  Take 1 tablet (300 mg total) by mouth every 8 (eight) hours.        CHANGE how you take these medications    tacrolimus 5 MG Cap  Commonly known as:  PROGRAF  Take 1 capsule (5 mg total) by mouth every 12 (twelve) hours.  What changed:  when to take this        CONTINUE taking these medications    bacitracin 500 unit/gram ointment  Apply topically as needed.     bisacodyl 10 mg Supp  Commonly known as:  DULCOLAX  Place 1 suppository (10 mg total) rectally once daily.     cloNIDine 0.3 MG tablet  Commonly known as:  CATAPRES  Take 1 tablet (0.3 mg total) by mouth every 8 (eight) hours.     epoetin anca-epbx 3,000 unit/mL injection  Commonly known as:  RETACRIT  Inject 1 mL (3,000 Units total) into the skin every Mon, Wed, Fri.     hydrALAZINE 100 MG tablet  Commonly known as:  APRESOLINE  Take 1 tablet (100 mg total) by mouth every 8 (eight) hours.     irbesartan 300 MG  tablet  Commonly known as:  AVAPRO  Take 1 tablet (300 mg total) by mouth every morning.     isosorbide mononitrate 30 MG 24 hr tablet  Commonly known as:  IMDUR  Take 3 tablets (90 mg total) by mouth once daily.     levETIRAcetam 500 MG Tab  Commonly known as:  KEPPRA  Take 1 tablet (500 mg total) by mouth 2 (two) times daily.     ondansetron 4 MG Tbdl  Commonly known as:  ZOFRAN-ODT  Take 1 tablet (4 mg total) by mouth every 6 (six) hours as needed.     pantoprazole 40 MG tablet  Commonly known as:  PROTONIX  Take 1 tablet (40 mg total) by mouth once daily.     polyethylene glycol 17 gram Pwpk  Commonly known as:  GLYCOLAX  Take 17 g by mouth 2 (two) times daily.     senna-docusate 8.6-50 mg 8.6-50 mg per tablet  Commonly known as:  PERICOLACE  Take 2 tablets by mouth once daily.     sevelamer HCl 800 MG Tab  Commonly known as:  RENAGEL  Take 800 mg by mouth 3 (three) times daily with meals.     verapamil 240 MG CR tablet  Commonly known as:  CALAN-SR  Take 1 tablet (240 mg total) by mouth every evening.        STOP taking these medications    carvedilol 25 MG tablet  Commonly known as:  COREG            Indwelling Lines/Drains at time of discharge:   Lines/Drains/Airways     Drain                 Hemodialysis AV Fistula Left forearm -- days                Time spent on the discharge of patient: 40 minutes  Patient was seen and examined on the date of discharge and determined to be suitable for discharge.         Jacky Ross MD  Department of Hospital Medicine  [facility]

## 2019-05-27 NOTE — PROGRESS NOTES
Transferred from unit via stretcher by transport, back to room. @ [id number] Report given to Rojelio PRUITT

## 2019-05-27 NOTE — PLAN OF CARE
Problem: Adult Inpatient Plan of Care  Goal: Plan of Care Review  Outcome: Ongoing (interventions implemented as appropriate)     05/27/19 0507   Plan of Care Review   Plan of Care Reviewed With patient;mother     Pt continues to have elevated BP, asymptomatic. Family at bedside. Denies headache and discomfort. Expressed going home on discharge. Safety maintained. Will monitor.

## 2019-05-27 NOTE — PLAN OF CARE
Problem: Adult Inpatient Plan of Care  Goal: Plan of Care Review  Outcome: Ongoing (interventions implemented as appropriate)  Hemodialysis tx complete, 2.5L removed in a tx of 3 hours, tolerated well. Blood returned via left forearm AVF, 15g buttonhole needles removed x2, gauze and tape applied, pressure held to each site for 5 minutes, hemostasis achieved

## 2019-05-27 NOTE — PLAN OF CARE
[facility]    HOME HEALTH ORDERS  FACE TO FACE ENCOUNTER    Patient Name: Holly Patel  YOB: 1990    PCP: Stan Sosa MD   PCP Address: [address] / [place] [address]  PCP Phone Number: [phone]  PCP Fax: [phone]    Encounter Date: 05/27/2019    Admit to Home Health    Diagnoses:  Active Hospital Problems    Diagnosis  POA    *Hypertensive urgency [I16.0]  Yes    Discharge planning issues [Z02.9]  Not Applicable    S/P craniotomy [Z98.890]  Not Applicable    Seizures [R56.9]  Yes     No seizures in about 8 months. Episodes were likely provoked in the setting of acute illness. She should continue AED medications as long as they are well tolerated and causing no side effects until she is seizure free about one year, then at that time we can discuss weaning medications if she chooses.      Immunosuppressed [D89.9]  Yes     Chronic    Anemia in ESRD (end-stage renal disease) [N18.6, D63.1]  Yes     Chronic    Renovascular hypertension [I15.0]  Yes     Chronic    ESRD on hemodialysis [N18.6, Z99.2]  Not Applicable     Chronic    Liver replaced by transplant [Z94.4]  Not Applicable     Chronic     hemangioendothelioma s/p LTx (1992)        Resolved Hospital Problems   No resolved problems to display.       Future Appointments   Date Time Provider Department Center   5/28/2019 12:00 PM [facility] OI-CT1 500 LB LIMIT [facility]   5/28/2019  1:30 PM Jose Luis Powell [facility] [place] [facility]   5/30/2019 10:45 AM LAB, HEMONC SAME DAY [facility]   6/6/2019 10:45 AM LAB, HEMONC SAME DAY [facility]   6/11/2019 10:00 AM [facility] OI-CT2 500 LB LIMIT [facility]   6/13/2019 10:00 AM Rob Wayne MD [facility] [place] [facility]   6/13/2019 11:00 AM LAB, HEMONC SAME DAY [facility] LAB [facility]           I have seen and examined this patient face to face today. My clinical findings that support the need for the  home health skilled services and home bound status are the following:  Weakness/numbness causing balance and gait disturbance due to Weakness/Debility making it taxing to leave home.    Allergies:  Review of patient's allergies indicates:   Allergen Reactions    Chloral hydrate Hallucinations     Other reaction(s): Hallucinations  Other reaction(s): Hives    Hydrocodone Other (See Comments)     Mental status changes    Tolerates oxycodone       Diet: renal diet    Activities: activity as tolerated    Nursing:   SN to complete comprehensive assessment including routine vital signs. Instruct on disease process and s/s of complications to report to MD. Review/verify medication list sent home with the patient at time of discharge  and instruct patient/caregiver as needed. Frequency may be adjusted depending on start of care date.    Notify MD if SBP > 160 or < 90; DBP > 90 or < 50; HR > 120 or < 50; Temp > 101;       CONSULTS:    Physical Therapy to evaluate and treat. Evaluate for home safety and equipment needs; Establish/upgrade home exercise program. Perform / instruct on therapeutic exercises, gait training, transfer training, and Range of Motion.  Occupational Therapy to evaluate and treat. Evaluate home environment for safety and equipment needs. Perform/Instruct on transfers, ADL training, ROM, and therapeutic exercises.        Medications: Review discharge medications with patient and family and provide education.      Current Discharge Medication List      START taking these medications    Details   labetalol (NORMODYNE) 300 MG tablet Take 1 tablet (300 mg total) by mouth every 8 (eight) hours.  Qty: 90 tablet, Refills: 0         CONTINUE these medications which have CHANGED    Details   tacrolimus (PROGRAF) 5 MG Cap Take 1 capsule (5 mg total) by mouth every 12 (twelve) hours.  Qty: 60 capsule, Refills: 11         CONTINUE these medications which have NOT CHANGED    Details   sevelamer HCl (RENAGEL) 800 MG  Tab Take 800 mg by mouth 3 (three) times daily with meals.      bacitracin 500 unit/gram ointment Apply topically as needed.  Refills: 0      bisacodyl (DULCOLAX) 10 mg Supp Place 1 suppository (10 mg total) rectally once daily.  Refills: 0      cloNIDine (CATAPRES) 0.3 MG tablet Take 1 tablet (0.3 mg total) by mouth every 8 (eight) hours.  Qty: 90 tablet, Refills: 11      epoetin anca-epbx (RETACRIT) 3,000 unit/mL injection Inject 1 mL (3,000 Units total) into the skin every Mon, Wed, Fri.  Qty: 1 vial, Refills: 3      hydrALAZINE (APRESOLINE) 100 MG tablet Take 1 tablet (100 mg total) by mouth every 8 (eight) hours.  Qty: 90 tablet, Refills: 11    Associated Diagnoses: Hypertension, unspecified type      irbesartan (AVAPRO) 300 MG tablet Take 1 tablet (300 mg total) by mouth every morning.  Qty: 30 tablet, Refills: 3    Associated Diagnoses: Hypertension, unspecified type      isosorbide mononitrate (IMDUR) 30 MG 24 hr tablet Take 3 tablets (90 mg total) by mouth once daily.  Qty: 90 tablet, Refills: 11      levETIRAcetam (KEPPRA) 500 MG Tab Take 1 tablet (500 mg total) by mouth 2 (two) times daily.  Qty: 60 tablet, Refills: 11    Associated Diagnoses: Seizures      ondansetron (ZOFRAN-ODT) 4 MG TbDL Take 1 tablet (4 mg total) by mouth every 6 (six) hours as needed.  Qty: 40 tablet, Refills: 3      pantoprazole (PROTONIX) 40 MG tablet Take 1 tablet (40 mg total) by mouth once daily.  Qty: 30 tablet, Refills: 11      polyethylene glycol (GLYCOLAX) 17 gram PwPk Take 17 g by mouth 2 (two) times daily.  Qty: 60 packet, Refills: 3      senna-docusate 8.6-50 mg (PERICOLACE) 8.6-50 mg per tablet Take 2 tablets by mouth once daily.      verapamil (CALAN-SR) 240 MG CR tablet Take 1 tablet (240 mg total) by mouth every evening.  Qty: 90 tablet, Refills: 3         STOP taking these medications       carvedilol (COREG) 25 MG tablet Comments:   Reason for Stopping:         lacosamide (VIMPAT) 50 mg Tab Comments:   Reason for  Stopping:               I certify that this patient is confined to her home and needs physical therapy and occupational therapy.

## 2019-05-27 NOTE — PROGRESS NOTES
[facility] ST[facility]FF HEMODIALYSIS NOTE     Patient currently on hemodialysis for removal of uremic toxins and volume.     Patient seen and evaluated on hemodialysis, tolerating treatment, see HD flowsheet for vitals and assessments.      Ultrafiltration goal is 2-2.5L     Labs have been reviewed and the dialysate bath has been adjusted.     Assessment/Plan:  Seen on dialysis this morning, tolerating well w/o complaints.  BP better controlled today.  Will continue iHD while in-patient    Anemia of ESRD  hgb below goal, but in setting of difficult to control HTN, will hold off on MARIA T at this time.    BMM  Phos low, will replace with oral phos after HD today.  Anticipate further clearance after HD  Renal diet  Calcium remains low after parathyroidectomy.  Will run on high calcium bath today.    JANESSA Mckenzie, FNP-BC  Nephrology  Pager:  [phone]

## 2019-05-27 NOTE — PLAN OF CARE
CM at bedside to discuss discharge planning assessment.   Patient being discharged home with home health.   Met with patient and her mother whom she lives with in house with no steps to entrance.    States she does not use medical equipment at this time.   Patient dialysis at [facility] in [facility] on T-TH-sat at 05:45 am.   My Health packet left for patient earlier this morning while patient was in dialysis.    Stan Sosa MD  [address] [facility] [place] [address]      [facility]/pharmacy #[id number] - [place], [place] - [address]  [address]  [place] [address]  Phone: [phone] Fax: [phone]    [facility]  [address]  [place] [address]  Phone: [phone] Fax: [phone]    Extended Emergency Contact Information  Primary Emergency Contact: Myrna Randolph  Address: [address] [place] [address]            [place] [address] [facility]  Home Phone: [phone]  Mobile Phone: [phone]  Relation: Mother  Secondary Emergency Contact: River Gonzales   [facility]  Home Phone: [phone]  Mobile Phone: [phone]  Relation: Father  Preferred language: English   needed? No    Future Appointments   Date Time Provider Department Center   5/28/2019 12:00 PM [facility] OI-CT1 500 LB LIMIT [facility] IC Imaging Ctr   5/28/2019  1:30 PM Jose Luis Powell MD [facility] NEUROSC [address]   5/30/2019 10:45 AM LAB, HEMONC SAME DAY [facility]   6/6/2019 10:45 AM [facility] [place] [facility]   6/11/2019 10:00 AM [facility] OIC-CT2 500 LB LIMIT [facility] IC Imaging [facility]tr   6/13/2019 10:00 AM Rob Wayne MD [facility] BM CAM[facility]ANO [place]   6/13/2019 11:00 AM LAB[facility]C [facility] [place] [facility]        05/27/19 1320   Discharge Assessment   Assessment Type Discharge Planning Assessment   Confirmed/corrected address and phone number on facesheet? Yes   Assessment information obtained from? Patient   Expected Length of Stay (days) 5   Communicated expected length of  stay with patient/caregiver yes   Prior to hospitilization cognitive status: Alert/Oriented   Current cognitive status: Alert/Oriented   Current Functional Status: Independent   Lives With child(ester), dependent;parent(s)   Able to Return to Prior Arrangements yes   Is patient able to care for self after discharge? Yes   Who are your caregiver(s) and their phone number(s)? self   Patient's perception of discharge disposition home health;home or selfcare   Readmission Within the Last 30 Days other (see comments)  ([facility])   If yes, most recent facility name: [facility]   Patient currently being followed by outpatient case management? No   Patient currently receives home health services? No   Patient currently receives any other outside agency services? No   Is it the patient/care giver preference to resume care with the current outside agency? No   Equipment Currently Used at Home none   Do you have any problems affording any of your prescribed medications? No   Is the patient taking medications as prescribed? yes   Does the patient have transportation home? Yes   Transportation Anticipated family or friend will provide   Dialysis Name and Scheduled days [facility]-Sat   Does the patient receive services at the [facility]? No   Discharge Plan A Home Health;Home with family   Discharge Plan B Home with family;Home Health   DME Needed Upon Discharge  none   Patient/Family in Agreement with Plan yes   Does the patient have family/friends to help with healtcare needs after discharge? yes   Does the patient currently use HME? No   Does the patient have transportation to healthcare appointments? Yes   Does the patient receive outpatient dialysis? Yes   Are there any open cases? No   Readmission Questionnaire   Living Arrangements house   At the time of discharge, did someone talk to you about what to watch out for regarding worsening of your health problem? Yes   Have you felt down, depressed, or hopeless? Unable  to Assess   Have you felt little interest or pleasure in doing things? Unable to assess   At the time of your discharge, did someone talk to you about what your health problems were? Yes   At the time of discharge, did someone talk to you about what to do if you experienced worsening of your health problem? Yes   At the time of discharge, did someone talk to you about which medication to take when you left the hospital and which ones to stop taking? Yes   At the time of discharge, did someone talk to you about when and where to follow up with a doctor after you left the hospital? Yes   How often do you need to have someone help you when you read instructions, pamphlets, or other written material from your doctor or pharmacy? Never   Do you have problems taking your medications as prescribed? No   Do you have problems obtaining/receiving your medications? No   Does your caregiver provide all the help you need? No   Are you currently feeling confused? No   Are you currently having problems thinking? No   Are you currently having memory problems? No   In the last 7 days, my sleep quality was: poor   Do you have any problems affording any of  your prescribed medications? No

## 2019-05-27 NOTE — PLAN OF CARE
Hepatology Referral Center contacted for follow up appointment.   Scheduling RN unavailable at this time.   Message will be sent to contact patient per hepatology clinic to schedule date and time.

## 2019-05-27 NOTE — PROGRESS NOTES
Maintenance hemodialysis tx started via left forearm AVF, using buttonhole needles, 15g, tolerated well, flows good. Dialysis days are T,TH,Sat

## 2019-05-27 NOTE — PLAN OF CARE
SW faxed referral to [facility] via  for review. SW will continue to follow.      05/27/19 8466   Post-Acute Status   Post-Acute Authorization Home Health/Hospice   Home Health/Hospice Status Referrals Sent     3:31 PM  Patient was accepted by [facility].    Marie Kaba LMSW   - [facility]  Ext. [phone]

## 2019-05-27 NOTE — NURSING
Patient alert and oriented .able to make needs known. Denied pain or discomfort. Stated she is possibly going home tomorrow. Blood pressure better today. Patient left unit for 40 minutes with mother pushing her in wheelchair . Returned to unit alert and in no distress. Assessment on going.

## 2019-05-28 NOTE — PATIENT INSTRUCTIONS
I have personally reviewed the MRI brain with the pt which shows postoperative changes from  left parietal left temporal craniectomy and mesh cranioplasty for tumor resection. 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    Pt's mother requesting help in sorting out pt's medications. Will get in touch with .    Pt released from my care at this time and advised to contact us with any questions, concerns, or if she experiences any new or worsening symptoms.

## 2019-05-28 NOTE — TELEPHONE ENCOUNTER
Spoke with pt's mother. States pt is getting head CT now. She agreed to bring pt to lab tomorrow morning.    While on phone, she had multiple medication questions. Pt is scheduled to see Dr. Powell today at 1330 and mother will discuss with him. She agreed to call me back with further questions.

## 2019-05-28 NOTE — PHYSICIAN QUERY
PT Name: Holly Patel  MR #: [id number]    Physician Query Form - Hematology Clarification      CDS/: Mary Carcamo RN            Contact information: [email or web address]    This form is a permanent document in the medical record.      Query Date: May 28, 2019    By submitting this query, we are merely seeking further clarification of documentation. Please utilize your independent clinical judgment when addressing the question(s) below.    The Medical record contains the following:   Indicators    Supporting Clinical Findings Location in Medical Record   X Anemia, Thrombocytopenia, Neutropenia, Pancytopenia documented Anemia in ESRD Hospital medicine   X H & H H/H 8.1, 25.2 Labs 5/23   X WBC WBC 2.39 Labs 5/23    Neutrophils     X Granulocytes Gran # (ANC) 1.4 Labs 5/23   X Platelets PLT 89 Labs 5/23    Transfusion(s)     X Treatments: Anemia in ESRD, on EPO Hospital medicine 5/24   X Other: Liver replaced by transplant  S/p liver transplant 1991  Continue with tacro  Daily tacro levels Hospital medicine 5/24     Provider, please specify diagnosis or diagnoses associated with above clinical findings.    [   ] Pancytopenia due to other drug   [X ] Pancytopenia   [   ] Other Hematological Diagnosis (please specify)   [  ] Clinically Undetermined         Please document in your progress notes daily for the duration of treatment, until resolved, and include in your discharge summary.

## 2019-05-28 NOTE — PROGRESS NOTES
Subjective:   I, Curly Betancur, attest that this documentation has been prepared under the direction and in the presence of Jose Luis Powell MD.     Patient ID: Holly Patel is a 28 y.o. female     Chief Complaint: Post-op Evaluation      HPI  Ms. Holly Patel is a pleasant 28 y.o. woman with a recent parathyroidectomy on 3/27/2019, ESRD on HD (MWF), and brain mass, who is s/p left parietal craniectomy and mesh cranioplasty for tumor resection on 04/22/2019 and presents  today for her postoperative follow up. The pt presented to [facility] on 03/12/2019 with a complaint of headaches, among other symptoms, and received a workup that included a CTH which showed a left temporal calvarium lesion with mass effect. The pt was subsequently seen in clinic on 04/09/2019, at which time she reported continued headaches, noting pain is usually on the left temporal aspect of her head but migrated over to the right temporal aspect several days prior. She endorses tenderness to palpation to this area in her head. We elected to proceed surgically as indicated above. Unfortunately, the pt became extremely hypertensive and had a systolic blood pressures into the 270s after surgery, which was not controlled in the postanesthesia care region. She developed an enlarging hematoma in the setting and began having right arm weakness.  She was emergently brought back to the operating room for evacuation of hematoma.    Pt states she has continued to have headaches secondary to her uncontrolled HTN. She was admitted to the hospital 5 days ago for hypertensive emergency and discharged yesterday. Pt's mother states she is having some trouble keeping up with the medications the pt is suppose to be taking as they are constantly changing.       Review of Systems   Constitutional: Negative for activity change, fatigue, fever and unexpected weight change.   HENT: Negative for facial swelling.    Eyes: Negative.    Respiratory: Negative.     Cardiovascular: Negative.    Gastrointestinal: Negative for diarrhea, nausea and vomiting.   Genitourinary: Negative.    Musculoskeletal: Negative for back pain, joint swelling, myalgias and neck pain.   Neurological: Positive for headaches. Negative for dizziness, weakness and numbness.   Psychiatric/Behavioral: Negative.       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 She appears well-developed and well-nourished.   HENT:   Head: Normocephalic and atraumatic.   Neck: Neck supple.   Neurological: She is alert and oriented to person, place, and time. No cranial nerve deficit. She displays a negative Romberg sign. GCS eye subscore is 4. GCS verbal subscore is 5. GCS motor subscore is 6.          IMAGING:  CT Head Without Contrast (04/26/2019) shows postoperative changes from  left parietal left temporal craniectomy and mesh cranioplasty for tumor  resection.    Pathology: Intraosseous hemangioma.     I have personally reviewed the images and pathology with the pt.      I, Dr. Jose Luis Powell, personally performed the services described in this documentation. All medical record entries made by the scribe, Curly Betancur, were at my direction and in my presence.  I have reviewed the chart and agree that the record reflects my personal performance and is accurate and complete. Jose Luis Powell MD. 05/28/2019    Assessment:     Hemangioma of the skull.  Hypertension.    Plan:   I have personally reviewed the MRI brain with the pt which shows postoperative changes from  left parietal left temporal craniectomy and mesh cranioplasty for tumor resection. 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    Pt's mother requesting help in sorting out pt's medications. Will get in touch with .    Pt released from my care at this time and advised to contact us with any questions, concerns, or if she experiences any new or worsening symptoms.

## 2019-05-29 NOTE — TELEPHONE ENCOUNTER
"Received call from  nurse; reported patient with B/P's today of 200/140 (now) and was 220/139 "around 1 pm ".  Reported patient has only taken one of her blood pressure meds today.   Instructed her that patient needs to get to ER, right away, this is medical emergency.  She is with the patient and will make sure she gets to ER, she said.    "

## 2019-05-29 NOTE — ED TRIAGE NOTES
Pt presents from home after home health visit with hypertension that began this morning. Pt states her blood pressure is normally controlled at home with medications but recently had a medication change and is unaware of what medications she is suppose to be taking at home. Pt reports headache at this time but denies any other symptoms. Pt denies any blurred vision/distrubances or weakness to any extremities. Pt had dialysis yesterday.

## 2019-05-29 NOTE — ED PROVIDER NOTES
Encounter Date: 5/29/2019       History     Chief Complaint   Patient presents with    Hypertension     Upon home health visit, pt was found to be hypertensive. Pt is asymptomatic. Pt does not know what medications she is supposed to be taking. Pt was in rehab for brain surgery and was just discharged home.      28 F w/ PMH of liver transplant, ESRD (last HD 5/27), renovascular HTN presents w/ HTN. Pt was recently discharged from hospital (5/27) following an admission for hypertensive emergency. She was discharged home w/ home health. Pt presents today w/ BP of 200/100. She reports that she was confused about her home medication regiment and has only taken her labetalol today. Pt endorses headache, but denies visual changes, dizziness, light headedness, chest pain, shortness of breath, abdominal pain, urinary or bowel symptoms.         Review of patient's allergies indicates:   Allergen Reactions    Chloral hydrate Hallucinations     Other reaction(s): Hallucinations  Other reaction(s): Hives    Hydrocodone Other (See Comments)     Mental status changes    Tolerates oxycodone     Past Medical History:   Diagnosis Date    Anemia in ESRD (end-stage renal disease) 10/12/2015    dialysis tues, thursday, sat; access left arm    Chronic rejection of liver transplant 3/22/2016    Depression     Encounter for blood transfusion     ESRD on hemodialysis 9/30/2015    History of recent hospitalization 05/2018    pneumonia    History of splenomegaly 4/12/2016    Immunosuppressed 8/5/2017    Iron deficiency anemia secondary to inadequate dietary iron intake 8/16/2017    She [facility].   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)    Moderate protein-calorie malnutrition 8/16/2017    MRSA bacteremia 8/6/2017    Pneumonia     Prophylactic immunotherapy 8/4/2014    Renovascular hypertension 10/2/2015    Secondary hyperparathyroidism  2017    Seizures     Sialadenitis 3/21/2018    Thrombocytopenia 2016     Past Surgical History:   Procedure Laterality Date    BIOPSY, LIVER, TRANSJUGULAR APPROACH N/A 2018    Performed by [facility] OR 2ND FLR    BIOPSY-LIVER N/A 2017    Performed by [facility] OR 2ND FLR    BIOPSY-LIVER N/A 3/22/2016    Performed by [facility]     SECTION      x 2    CONIZATION-CERVICAL-LEEP N/A 6/15/2018    Performed by [facility]quin [facility] at [facility] OR    [id number]; upper extremity Left 2015    Performed by Idalia Diaz [facility] at [facility] OR 2ND FLR    CRANIOPLASTY  2019    Performed by [facility] Powell [facility] at [facility] OR 2ND FLR    CRANIOTOMY-- left crani for clot evac with microscrope Left 2019    Performed by [facility] at [facility] OR 2ND FLR    EMBOLIZATION, BLOOD VESSEL N/A 2018    Performed by Aren Ramos MD at [facility] CATH LAB    Exam Under Anesthesia N/A 2018    [facility] by [facility] at [facility] OR 2ND FLR    Exam under anesthesia N/A 2018    Performed by [facility] at [facility] OR    Exam under anesthesia (ADD ON ) N/A 2018    Performed by NICKIE Alvarez MD at [facility] OR    Exam under anesthesia -cervical suturing  N/A 2018    Performed by Lei Sims III, MD at [facility] OR    EXCISION, NEOPLASM, SKULL with Cranioplasty Left 2019    Performed by [facility] at [facility] OR 2ND FLR    FISTULOGRAM Left 2015    Performed by [facility] at [facility] CATH LAB    LIVER BIOPSY      LIVER TRANSPLANT  1992    PARATHYROIDECTOMY, Minimally Invasive Bilateral Exploration Bilateral 3/27/2019    Performed by Ashley Guallpa [facility] at [facility] OR 2ND FLR    SUTURE REPAIR,CERVIX  2018    Performed by [facility]quin [facility] at [facility] OR    TUBAL LIGATION       Family History   Problem Relation Age of Onset     Hypertension Mother     Hypertension Father     Cancer Sister     Heart attack Maternal Uncle     Melanoma Neg Hx     Breast cancer Neg Hx     Colon cancer Neg Hx     Ovarian cancer Neg Hx      Social History     Tobacco Use    Smoking status: Never Smoker    Smokeless tobacco: Never Used   Substance Use Topics    Alcohol use: No    Drug use: No     Review of Systems   Constitutional: Negative for chills, fatigue, fever and unexpected weight change.   HENT: Negative for tinnitus and voice change.    Eyes: Negative for visual disturbance.   Respiratory: Negative for cough, chest tightness and shortness of breath.    Cardiovascular: Negative for chest pain, palpitations and leg swelling.   Gastrointestinal: Negative for abdominal distention, constipation, diarrhea, nausea and vomiting.   Genitourinary: Negative for dysuria, hematuria and urgency.   Musculoskeletal: Negative for gait problem.   Skin: Negative.    Neurological: Negative for dizziness, tremors, weakness, light-headedness, numbness and headaches.   Hematological: Negative.    Psychiatric/Behavioral: Negative.        Physical Exam     Initial Vitals [05/29/19 1631]   BP Pulse Resp Temp SpO2   (!) 200/100 80 18 98.7 °F (37.1 °C) 99 %      MAP       --         Physical Exam    Nursing note and vitals reviewed.  Constitutional: She appears well-developed and well-nourished. She is not diaphoretic. No distress.   HENT:   Head: Normocephalic and atraumatic.   Mouth/Throat: No oropharyngeal exudate.   Eyes: Conjunctivae and EOM are normal. Pupils are equal, round, and reactive to light. No scleral icterus.   Neck: Normal range of motion. Neck supple.   Cardiovascular: Regular rhythm, normal heart sounds and intact distal pulses.   No murmur heard.  Pulmonary/Chest: Breath sounds normal. No respiratory distress.   Abdominal: Soft. Bowel sounds are normal.   Musculoskeletal: Normal range of motion. She exhibits no edema.   Neurological: She is alert and  oriented to person, place, and time.   Skin: Skin is warm.         ED Course   Procedures  Labs Reviewed   POCT GLUCOSE - Abnormal; Notable for the following components:       Result Value    POC Glucose 113 (*)     POC BUN 36 (*)     POC Creatinine 7.7 (*)     POC Ionized Calcium 0.71 (*)     POC Hematocrit 23 (*)     All other components within normal limits   ISTAT CHEM8          Imaging Results    None          Medical Decision Making:   History:   Old Medical Records: I decided to obtain old medical records.  Old Records Summarized: records from clinic visits, records from previous admission(s) and records from another hospital.  Initial Assessment:   On presentation, pt resting comfortably in bed in no acute distress. She is breathing room air and speaking in full sentences. Pt is alert and oriented to person, place and time. CN's grossly intact. Pt denies any chest pain, SOB, abdominal pain, dizziness or visual changes. She reports headaches. Auscultation of lungs clear. /100. Vitals otherwise stable.  Differential Diagnosis:   Medication non-compliance, hypertensive urgency, hypertensive emergency, malignant hypertension  ED Management:  Istat  Home BP medications were restarted        APC / Resident Notes:   28 F w/ PMH of hypertensive emergency presents w/ HTN. Pt reports she has not been taking her home medications because she was confused about her medication scheduling. She explains she only took her labetalol today. She normally is on clonidine, verapamil, hydralazine, irbesartan, imdur and labatolol. She presents w/ BP of 200/100. Her home medications were restarted. Pharmacy educated her and her mother on the correct way to take her medications. Pt's BP returned to baseline and she was discharged home in stable condition.          Attending Attestation:   Physician Attestation Statement for Resident:  As the supervising MD   Physician Attestation Statement: I have personally seen and examined  this patient.   I agree with the above history. -: Mother states that they have all of the medications, she just felt unsure of how to give them and so did not  Pt states only has a headache, intermittent since discharge.  It resolved w/ tylenol yesterday but she didn't take any today  Was to resume usual T/Th/Sa HD schedule tomorrow and had HD in hospital on Monday  Feels well aside from the HA.  No cp/sob, visual changes, new weakness/numbness, vomiting or other concerning symptoms  Has been taking other medications  Mother states prior to the surgeries, pt was managing her own meds but now she (the mother) is and feels she doesn't understand how to give them   As the supervising MD I agree with the above PE.   -: Nad, wdwn  Anicteric, mm  No meningismus  Awake/alert  ctab  Rrr, nl s1/2  abd soft, ntnd  No edema  Skin warm/dry  Nl affect   As the supervising MD I agree with the above treatment, course, plan, and disposition.   -: Uncontrolled chronic htn, only symptom being a bifrontal ha, in setting of 2d w/o meds due to mother being unsure of how to give them.  Prior admissions for hypertensive crisis - this is a consideration at this time, but is considered less likely given overall clinical context.  Low suspicion for stroke, acs, or other emergent process related to her chronic htn - this is almost certainly due to med noncompliance (with some contribution from reaching the end of her 2nd day since HD).  Will check potassium, give home meds orally, monitor, and re-eval.  Will also ask pharmacy to review meds w/ pt and her mother to make sure that they understand how these should be given at home.  Given pt controlled on these meds as inpt, will not make changes to planned dosing at this time.  If continues to have uncontrolled chronic htn after being on proper meds and usual HD schedule, will need further titration by her outpt doctors.                         Clinical Impression:       ICD-10-CM ICD-9-CM    1. HTN (hypertension) I10 401.9         Disposition:   Disposition: Discharged  Condition: Stable       Puneet Cheng MD  Resident  05/29/19 2007       Philip Ramos MD  05/29/19 2017

## 2019-05-30 NOTE — ED NOTES
Physician aware of BP- new prescriptions given to pt as well as medication reconciliation on how to administer them at home. BP trending down at this time.

## 2019-05-30 NOTE — ED NOTES
LOC: Patient name and date of birth verified for Holly Patel. The patient is awake, alert and aware of environment with an appropriate affect, the patient is oriented x 3 and speaking appropriately.   APPEARANCE: Patient resting comfortably, patient is clean and well groomed, patient's clothing is properly fastened.  SKIN: The skin is warm and dry, color consistent with ethnicity, patient has normal skin turgor and moist mucus membranes, skin intact, no breakdown or bruising noted. Dialysis fistula to L arm.   MUSCULOSKELETAL: Patient moving all extremities well, no obvious swelling or deformities noted.   RESPIRATORY: Respirations are spontaneous, patient has a normal effort and rate, no accessory muscle use noted.  CARDIAC: Patient has a normal rate, no periphreal edema noted, capillary refill < 3 seconds.  ABDOMEN: Soft and non tender to palpation, no distention noted. Ascites to abd- normal for pts baseline.   NEUROLOGIC: Eyes open spontaneously, behavior appropriate to situation, follows commands, facial expression symmetrical.

## 2019-05-31 NOTE — LETTER
May 31, 2019    Holly Patel  [address] [place] [id number]          Dear Holly Patel:  MRN: [id number]    This is a follow up to your recent labs, your liver lab results were stable.  There are no medicine changes.  Please have your labs drawn again on 6/24/19.      If you cannot have your labs drawn on the scheduled date, it is your responsibility to call the transplant department to reschedule.  To reschedule or make an appointment, please as to speak to or leave a message for my assistant, Tara Pollack or Piper, at [phone].  When leaving a message for Tara Pollack Angela or myself, we ask that you leave a brief message regarding your request.    Sincerely,    Violeta Francis, RN, BSN, [facility]  Liver Transplant Coordinator  [facility]  [address] [place] [id number]  [phone]

## 2019-06-03 NOTE — PATIENT INSTRUCTIONS
KEEP UP THE GOOD WORK WITH YOUR BLOOD PRESSURE    LOOKS GOOD TODAY.     SCHEDULE APPOINTMENT WITH DR. CARTER FOR FOLLOW UP END OF July.

## 2019-06-03 NOTE — PROGRESS NOTES
Subjective:       Patient ID: Holly Patel is a 28 y.o. 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 Sent to ED by  nurse reports /100 on 5/29/19. Mother had some confusion about BP regimen. Had consultation with Pharmacy while in ED, home BP meds given with improvement of BP.     BP better today. 120/56. No more headaches. No cp, sob, or dizziness. Complaint with all meds but unable to recall them. Has HH and attending dialysis T/TH/Sa. No recent issues with HD per pt.     She is without acute complaints today.       Transitional Care Note    Family and/or Caretaker present at visit?  Yes.  Diagnostic tests reviewed/disposition: No diagnosic tests pending after this hospitalization.  Disease/illness education: HTN, ESRD,   Home health/community services discussion/referrals: Patient has home health established at [facility].   Establishment or re-establishment of referral orders for community resources: No other necessary community resources.   Discussion with other health care providers: No discussion with other health care providers necessary.     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       Patient was admitted to [facility] from 4/22/2019 to 5/21/19 (29 days).  She originally was admitted to [facility] for excision of L calvarial lesion and cranioplasty with clot evaculation on 4/22.  "She was continued on vimpat and keppra. She is to receive an additional 500 mg dose of keppra after HD on HD days. The patient had issues with thrombocytopenia post operatively. She has had issues with chronic thrombocytopenia but due to her new neurosurgery, she was transfused platelets to maintain a goal of > 60. She was stepped down to medicine eventually. Most of her issues post operatively were related to her BP and platelets. Heme onc was consulted to assist with her thrombocytopenia and eventually she was given dexamethasone 40 mg daily x 4 days and her platelets did respond. Initially the thought was that she may need radiation or splenic embolization if this fails but the need was negated as her platelets responded. Neurosurgery final recs was for platelet goal > 40k. Her BP continued to be challenging and this was discussed with cardiology and with nephrology. Cardiology recommended that nephrology manage her BPs as she is ESRD. Nephrology stated that the patient suffers with poorly controlled BP and will try to see if there are any other options but for now to continue medical management. Imdur was added to her regimen but she still maintained elevated but entirely asymptomatic. She will follow up with NS as scheduled. She worked with [facility] and was recommended for rehab. She was accepted to [facility].       On the evening of 5/22, patient states that she developed a HA and high BP in the [facility].  She states that her BP started "acting crazy" and then she noticed she had a HA. The pain is sharp and throbbing and is localized to her left parietal/temporal area without radiation to other areas. Nothing seems to improve her headache or high blood pressure  She also states that she has had blurred vision since her surgery during her last hospital stay.  She takes her BP medicine at home: carvedilol, clonidine, hydralazine, irbesartan and isosorbide mononitrate.  Patient states her last seizure was 1 month " ago.      When the patient arrived to the ED, BP was 181/123.  ICU was consulted in the ED, and state patient is stable for hospital medicine.      * No surgery found *       Hospital Course:   Admitted to hospital medicine. Neurosurgery, Ophthalmology and critical care consulted. CTH was done which was negative for acute process. She evaluated by neurosurgery, who recommended BP optimization. Home blood pressure regimen was resumed with improvement in headache and blurry vision. Ophthalmology was consulted. Nephrology consulted for HD, and after discussion with them additional fluid was removed (3.5L, as opposed to her usual 3 L). Her new dry weight was documented as 53 kg. Overnight patient again became hypertensive with recurrence of symptoms, and was given her home hydralazine with improvement. Her home coreg was discontinued and she was started on labetalol, which was titrated up, with improvement in both blood pressure and symptoms. Patient was offered return to [facility] ([facility] from), but patient declined and wished to be discharged home with home health for continued PT. Her tacro dosing was discussed with hepatology and she was discharged with 5 mg tacro BID with repeat tacro levels Wednesday and Thursday and hepatology follow up.          Review of patient's allergies indicates:   Allergen Reactions    Chloral hydrate Hallucinations     Other reaction(s): Hallucinations  Other reaction(s): Hives    Hydrocodone Other (See Comments)     Mental status changes    Tolerates oxycodone     Past Medical History:   Diagnosis Date    Anemia in ESRD (end-stage renal disease) 10/12/2015    dialysis tues, thursday, sat; access left arm    Chronic rejection of liver transplant 3/22/2016    Depression     Encounter for blood transfusion     ESRD on hemodialysis 9/30/2015    History of recent hospitalization 05/2018    pneumonia    History of splenomegaly 4/12/2016    Immunosuppressed 8/5/2017     Iron deficiency anemia secondary to inadequate dietary iron intake 8/16/2017    She receives IV iron periodically at the [facility].   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)    Moderate protein-calorie malnutrition 8/16/2017    MRSA bacteremia 8/6/2017    Pneumonia     Prophylactic immunotherapy 8/4/2014    Renovascular hypertension 10/2/2015    Secondary hyperparathyroidism 8/5/2017    Seizures     Sialadenitis 3/21/2018    Thrombocytopenia 4/12/2016     Social History     Tobacco Use    Smoking status: Never Smoker    Smokeless tobacco: Never Used   Substance Use Topics    Alcohol use: No    Drug use: No           Review of Systems   Constitutional: Negative for chills, fever and unexpected weight change.   Respiratory: Negative for cough and shortness of breath.    Cardiovascular: Negative for chest pain and leg swelling.   Gastrointestinal: Negative for abdominal pain, nausea and vomiting.   Endocrine: Negative for polyphagia.   Skin: Negative for rash.   Neurological: Negative for weakness, light-headedness and headaches.       Objective: BP (!) 120/56   Pulse 94   Wt 54.2 kg (119 lb 7.8 oz)   SpO2 99%   BMI 20.51 kg/m²         Physical Exam   Constitutional: She appears well-developed and well-nourished. No distress.   HENT:   Head: Normocephalic and atraumatic.   Mouth/Throat: Oropharynx is clear and moist.   Cardiovascular: Normal rate and regular rhythm. Exam reveals no friction rub.   No murmur heard.  Pulmonary/Chest: Effort normal and breath sounds normal. She has no wheezes. She has no rales.   Abdominal: Soft. Bowel sounds are normal. There is no tenderness.   Musculoskeletal: Edema: trace ankle, non pitting.   Lymphadenopathy:     She has no cervical adenopathy.   Neurological: She is alert.   Skin: Skin is warm and dry. No rash noted.   Vitals reviewed.      Assessment:       1. Renovascular hypertension    2. Hospital discharge follow-up     3. ESRD on dialysis    4. S/P craniotomy        Plan:         Holly was seen today for follow-up.    Diagnoses and all orders for this visit:    Hospital discharge follow-up  Pt doing well  No acute complaints    Renovascular hypertension  BP much improved and at goal  Continue current regimen  Bring all meds on RTC for review    ESRD on dialysis  On HD T/TH/Sa, tolerating well.     S/P craniotomy  Pathology report indicates this is an intraosseous hemangioma, which is a benign finding  Seen on 5/28 by NSGY and released at that time            Vandana Martinez PA-C      Future Appointments   Date Time Provider Department Center   6/6/2019 10:45 AM LAB, HEMONC SAME DAY [facility] LAB DEACON [place]   6/11/2019 10:00 AM [facility] OI-CT2 500 LB LIMIT [facility]   6/13/2019 10:00 AM Rob Wayne MD [facility] BM CAMPU[facility]O [facility] [place]   6/13/2019 11:00 AM LAB, HEMONC SAME DAY [facility] LAB DEACON [place]   6/24/2019  8:15 AM LAB, LAPALCO [facility] LAB [place]

## 2019-06-06 NOTE — TELEPHONE ENCOUNTER
----- Message from Jose Luis Powell MD sent at 6/5/2019  4:40 PM CDT -----  Yes.  She is clear.  No malignancy and she has completely recovered from her surgery and hospitalization.     MLW  ----- Message -----  From: Lien Petersen  Sent: 6/5/2019  10:17 AM  To: Jose Luis Powell MD    I see Holly has been discharged from your care. Can she be cleared for kidney transplant from a neurosurgical point of view?    Lien  Clinical Transplant Coordinator

## 2019-06-07 NOTE — TELEPHONE ENCOUNTER
Called pt to schedule repeat pap per Dr Rosario. Pt states she has to many appts scheduled in July and will call when she is ready to come in for repeat pap.

## 2019-06-07 NOTE — TELEPHONE ENCOUNTER
----- Message from Adriane Rosario MD sent at 6/6/2019  5:18 PM CDT -----  Yes she needs a follow up pap next month though.  Will make sure my MA calls her in for this.  Thanks  Dr Rosario  ----- Message -----  From: Lien Trinity  Sent: 6/6/2019   1:22 PM  To: Adriane Rosario MD    Can this lady be cleared for kidney transplant from gyn point of view in light of her abn pap?    Lien

## 2019-06-13 NOTE — TELEPHONE ENCOUNTER
Hi, please see the request from [facility] on this --  and please assist in ordering    Orders Placed This Encounter    WALKER FOR HOME USE       Thank you, Stan Sosa

## 2019-06-14 PROBLEM — H53.461 RIGHT HOMONYMOUS HEMIANOPSIA: Status: ACTIVE | Noted: 2019-01-01

## 2019-06-14 NOTE — NURSING
Pt records reviewed.   Pt will be referred to Hepatology.  Liver disease  Initial referral received  from the [facility]que.   Referring Provider/diagnosis  Jacky Ross MD      Referral letter sent to patient.

## 2019-06-14 NOTE — COMMITTEE REVIEW
Native Organ Dx:       Unable to determine transplant candidacy at this time due to need for hematology consult due to pancytopenia as it related to additional immunosuppression, follow up visit in clinic to assess functional status, and recovery from crainiotomy, follow up visit with social work to assess caregiver support and cognitive status.  Also needs updated pap smear.    Need to consult hem-onc for pancytopenia and as it relates to future immunosuppression for kidney transplantation. Needs optimization of PTH and BP as well - PTH was 1429 from 9/26/18 and BP was 215/136 at time of initial RR clinic visit dated 9/26/18.     I called to talk to Holly about the committee decision and she stated I should talk to her momma and handed the phone off. I questioned her mom about this and she states Holly does this because her memory is not ok. She further stated nobody is working with her on this. I reviewed with her mom the committee decision and the need to have hematology clearance for pancytopenia, follow up with transplant nephrologist and  and that she is due for her PAP smear. She verbalized understanding.    Note written by Lien Petersen RN    ===============================================    I was present at the meeting and attest to the decision of the committee. My additional comments are bolded above.    Daylin Hoyt MD

## 2019-06-14 NOTE — PROGRESS NOTES
Reliability good  Cooperation good  Fixation good  Patient isn't allergic to latex material.  Hvf done ou TT

## 2019-06-14 NOTE — PROGRESS NOTES
HPI     DLS:11/02/2018 Miko  Patient here for ED follow up per discharge from the hospital.  S/P craniotomy.  Pt states no change in vision. OU still not clear.  No eye pain.  Review HVF    I have personally interviewed the patient, reviewed the history and   examined the patient and agree with the technician's exam.    Last edited by Puneet Engel MD on 6/14/2019  3:29 PM. (History)            Assessment /Plan     For exam results, see Encounter Report.    Nontraumatic subcortical hemorrhage of left cerebral hemisphere  -     Jennings Visual Field - OU - Extended - Both Eyes    Right homonymous hemianopsia  -     Jennings Visual Field - OU - Extended - Both Eyes      Ms. Patel' visual field cut has not changed. Her hypertensive retinopathy has improved. I will repeat her exam and visual field testing in three months to see if she improves.

## 2019-06-14 NOTE — LETTER
June 19, 2019    Holly Patel  [address] [place] [address]  [facility] [id number]    Dear Holly Patel:  MRN: [id number]    Your transplant evaluation was reviewed at the [facility] meeting on 6/14/2019.  It is with regret I inform you that your workup is incomplete and we are unable to determine your transplant candidacy at this time due to need for hematology consult related to low blood counts and follow up appointments with transplant nephrology and social work.  You will be re-presented to the selection committee once pending studies are completed.  You will be notified of the committees decision once we review the new results.    The [facility] Selection Committee carefully considers each patients transplant candidacy using established selection criteria to determine if it is safe to proceed with transplantation for each and every person evaluated.  Although the selection committee believes your workup is incomplete and we are unable to determine your transplant candidacy, you have the right to be evaluated at other transplant centers.  You may request your [facility] records be sent to any center of your choice by contacting our Medical Records Department at [phone].    Attached is a letter from the [employer] for Organ Sharing (UNOS).  It describes the services and information offered to patients by UNOS and the Organ Procurement and Transplant Network.    Sincerely,      Arielle Huitron MD  Medical Director, Kidney & Kidney/Pancreas Transplantation  [facility]   In Basket:  Dr. LINNEA Pinedo  Faxed to:    [facility]      Encl: UNOS Letter                          OPTN/UNOS: Your Resource for Organ Transplant Information        If you have a question regarding your own medical care, you always should call your transplant center first. However, for general organ transplant-related information, you can call the [employer] Organ [employer]aring (UNOS)  toll-free patient services line at [phone].    Anyone, including potential transplant candidates, recipients, family members/friends, living donors, and/or donor family members can call this number to:    · talk about organ donation, living donation, how transplant and donation work, the donation process, transplant policies, and transplant/donor information;  · get a free patient information kit with helpful booklets, waiting list and transplant information, and a list of all transplant centers;  · ask questions about the Organ Procurement and Transplantation Network (OPTN) web site (www.optn.transplant.hrsa.gov); the UNOS Web site (www.unos.org); or the UNOS web site for living donors and transplant recipients (www.transplantliving.org);  · learn how UNOS and the OPTN can help you;  · talk about any concerns that you may have with a transplant center and how they perform    UNOS is a not-for-profit organization that provides all of the administrative services for the national OPTN under federal contract to the Health Resources and Services Administration (HRSA), an agency under the U.S. Department of Health and Human Services (HHS).     UNOS and OPTN responsibilities include:    · writing educational material for patients, the public and professionals;  · helping to make people aware of the need for donated organs and tissue;  · writing organ transplant policy with help from doctors, nurses, transplant patients/candidates, donor families, living donors, and the public;  · coordinating the organ matching and placement process;  · collecting information about every organ transplant and donation that occurs in the [place].    Remember, you should contact your transplant center directly if you have questions or concerns about your own medical care including medical records, work-up progress and test reports. [facility] is not your transplant center, and staff at [facility] will not be able to transfer you to your  transplant center, so keep your transplant centers phone number handy. But, while you research your transplant needs and learn as much as you can about transplantation and donation, we welcome your call to our toll-free patient services line at [phone].

## 2019-06-14 NOTE — LETTER
June 14, 2019    Holly Patel  [address] [place] [id number]      Dear Holly Patel:    Your doctor has referred you to the [facility]. We are sending this letter to remind you to make an appointment with us to complete the referral process.     Please call us at [phone] to schedule an appointment. We look forward to seeing you soon.     If you received a call and have been scheduled, please disregard this letter.       Sincerely,        [facility]   [address] [place] [id number]  [phone]

## 2019-06-14 NOTE — TELEPHONE ENCOUNTER
"I called and spoke with a nurse in the office of [facility] to inquire about Holly and what services she is getting from home health. Currently she has a nurse going weekly and PT and OT going 2x/wk. I spoke with   Chastity who is the OT who is seeing Holly and she relates that Holly is doing well with assuming more of her ADL's. She is able to stand for 20 minutes at a time and her walking is getting much more stable. She relates that memory is an ongoing issue for Holly and she cannot remember what goes on throughout her day. Holly and her mom are most worried about this and Speech therapy is being requested to help with this. Chastity relates that Holly does not seem to have motivation "and is happy to lay on the couch and watch TV." Chastity has encouraged the mom to get Holly more involved in all daily activities about the house. Mom is the one taking responsibility for Holly's medical care and medications.  "

## 2019-06-17 NOTE — TELEPHONE ENCOUNTER
----- Message from Day Caba LPN sent at 6/14/2019  5:05 PM CDT -----  Please schedule follow up appointment  .   Pt will be referred to Hepatology.  Liver disease  Initial referral received  from the workque.   Referring Provider/diagnosis  Jacky Ross MD

## 2019-06-17 NOTE — TELEPHONE ENCOUNTER
Hi,  Please call the home health therapist Chastity back or the patient and ask her to call us back if her pressure remains high after dialysis tomorrow.  Let me know if patient has any questions.  Thank you, Stan Sosa    Called by OT from  -- re  long pressures, 160-175/104-112 at home

## 2019-06-19 NOTE — TELEPHONE ENCOUNTER
Liver Txp  1992, awaiting Kidney Txp.  Brigette with patient access calling, states Razia, with [facility],  is calling to report pt's bp is 150/100, she has disconnected now, would like to be called back, phone # [phone].\  S/w Razia, pt has taken her bp meds this am, and bp is elevated at 150/100, asymptomatic, has not missed any recent doses.  Her bp was elevated after last hemodialysis, as well.  Pt has no transportation today, and has HD on T,TH,Sa.  Will message Dr. Sosa and txp team, as well.    Reason for Disposition   Systolic BP >= 160 OR Diastolic >= 100    Protocols used: HIGH BLOOD PRESSURE-A-OH

## 2019-06-19 NOTE — TELEPHONE ENCOUNTER
Pt came to clinic with paperwork to be filled out by MD. Advised that disability paperwork needs to be dropped off at the disability office for review and completion.

## 2019-06-19 NOTE — TELEPHONE ENCOUNTER
Djean Wade,    Please call the patient -- we have received a few calls about her pressure being high.    since she is already on 6 BP meds I do not recommend another medicine at this time..    I do recommend that she try her best to minimize salty foods in her diet.    Also, I would recommend she see if there is a nutritionist at the dialysis facility with whom she can speak about eating foods low in sodium. Also she can ask her dialysis doctor for any other recommendations. I will email Dr Bass as well.    Let me know if patient has any questions.  Thank you, Stan Sosa

## 2019-06-19 NOTE — TELEPHONE ENCOUNTER
Spoke with pt, she states that after dyalisis yesterday her b/p has been good and she has been checking it and its not high any longer.

## 2019-06-19 NOTE — TELEPHONE ENCOUNTER
Spoke with Tawanna with [facility], she states that she was at pt's home and took he rb/p manually and it was 150/100. She states that pt is under the impression that if her b/p is not in 200's , then its good. Pt is asymptomatic.  phone number is  - [phone].

## 2019-06-19 NOTE — TELEPHONE ENCOUNTER
----- Message from Oriana Roa sent at 6/19/2019  2:52 PM CDT -----  Contact: Patient   Needs Advice    Reason for call: Need coordinator to fill out paperwork for her         Communication Preference: [phone]     Additional Information: N/A

## 2019-06-19 NOTE — TELEPHONE ENCOUNTER
Spoke with Tawanna with [facility], she states that she was at pt's home and took he rb/p manually and it was 150/100. She states that pt is under the impression that if her b/p is not in 200's , then its good. Pt is asymptomatic.  phone number is  - [phone].

## 2019-06-21 NOTE — TELEPHONE ENCOUNTER
----- Message from Estrella Echeverria sent at 6/21/2019 11:51 AM CDT -----  Contact: Patient      Reason for call: Need papers that was filled out        Communication Preference: [phone]    Additional Information: N/A     Thank you

## 2019-06-25 NOTE — PROGRESS NOTES
CC: Acute on chronic thrombocytopenica     HPI Holly Patel is a 27 y.o. female with secondary hypertension, h/o liver transplant in 1992, ESRD on HD via L RC AVF placed July 2015. She is here for follow up for low platelets. Denies any overt bleeding from GI/ tracts or nose. No hematuria. No vaginal bleeding or spotting. No recent medication changes or recent travel. She reports losing 30 lbs since early 2018, unintentionally. Her appetite is not 'good'. No fever or night sweats.  Platelet count was normal until Nov 2014. It has declined since then. It ranged between 79k -155 k in 2015, 64k- 122k in 2016, 50k -113k in 2017.   Most recent platelet count is 101kon  5/31/18. She also has significant anemia, with most recent hemoglobin of 7.1. She has mild leukopenia as well.     Interval history: Patient presents to clinic for hospital follow-up. She was recently admitted at [facility] from 5/23/19 to 5/27/19 for elevated BP. She was previously admitted 4/22/19 to 5/21/19 to [facility] for excision of L calvarial lesion and cranioplasty with parenchymal hematoma evacuation. Please see hospital course below. She was last seen in this clinic 5/2018 by Dr. Ortega but was lost to follow up. Today, she c/o fatigue and SOB on exertion. Denies any fever, chill, chest pain, palpitations, or headaches.     Hospital Course 4/22/19-5/21/19:  The patient was admitted to [facility] for excision of L calvarial lesion and cranioplasty with clot evaculation on 4/22. She was continued on vimpat and keppra. She is to receive an additional 500 mg dose of keppra after HD on HD days. The patient had issues with thrombocytopenia post operatively. She has had issues with chronic thrombocytopenia but due to her new neurosurgery, she was transfused platelets to maintain a goal of > 60. She was stepped down to medicine eventually. Most of her issues post operatively were related to her BP and platelets. Heme onc was consulted to assist with her  thrombocytopenia and eventually she was given dexamethasone 40 mg daily x 4 days and her platelets did respond. Initially the thought was that she may need radiation or splenic embolization if this fails but the need was negated as her platelets responded. Neurosurgery final recs was for platelet goal > 40k. Her BP continued to be challenging and this was discussed with cardiology and with nephrology. Cardiology recommended that nephrology manage her BPs as she is ESRD. Nephrology stated that the patient suffers with poorly controlled BP and will try to see if there are any other options but for now to continue medical management. Imdur was added to her regimen but she still maintained elevated but entirely asymptomatic. She will follow up with NS as scheduled. She worked with [facility] and was recommended for rehab. She was accepted to [facility].    Hospital Course 5/23/19-5/27/19:   Admitted to hospital medicine. Neurosurgery, Ophthalmology and critical care consulted. CTH was done which was negative for acute process. She evaluated by neurosurgery, who recommended BP optimization. Home blood pressure regimen was resumed with improvement in headache and blurry vision. Ophthalmology was consulted. Nephrology consulted for HD, and after discussion with them additional fluid was removed (3.5L, as opposed to her usual 3 L). Her new dry weight was documented as 53 kg. Overnight patient again became hypertensive with recurrence of symptoms, and was given her home hydralazine with improvement. Her home coreg was discontinued and she was started on labetalol, which was titrated up, with improvement in both blood pressure and symptoms. Patient was offered [facility]), but patient declined and wished to be discharged home with home health for continued PT. Her tacro dosing was discussed with hepatology and she was discharged with 5 mg tacro BID with repeat tacro levels Wednesday and Thursday  and hepatology follow up.       Review of Systems   Constitutional: Positive for malaise/fatigue. Negative for chills, fever and weight loss.   HENT: Negative.    Eyes: Negative.    Respiratory: Negative.    Cardiovascular: Negative.    Gastrointestinal: Negative.    Genitourinary: Negative.    Musculoskeletal: Negative.    Skin: Negative.    Neurological: Negative.    Endo/Heme/Allergies: Negative.    Psychiatric/Behavioral: Negative.      Current Outpatient Medications   Medication Sig    bacitracin 500 unit/gram ointment Apply topically as needed.    bisacodyl (DULCOLAX) 10 mg Supp Place 1 suppository (10 mg total) rectally once daily.    calcitRIOL (ROCALTROL) 0.5 MCG Cap Take 2 capsules (1 mcg total) by mouth 2 (two) times daily.    cloNIDine (CATAPRES) 0.1 MG tablet Take 3 tablets (0.3 mg total) by mouth 3 (three) times daily.    epoetin anca-epbx (RETACRIT) 3,000 unit/mL injection Inject 1 mL (3,000 Units total) into the skin every Mon, Wed, Fri.    hydrALAZINE (APRESOLINE) 100 MG tablet Take 1 tablet (100 mg total) by mouth every 8 (eight) hours.    irbesartan (AVAPRO) 300 MG tablet Take 1 tablet (300 mg total) by mouth every morning.    isosorbide mononitrate (IMDUR) 30 MG 24 hr tablet Take 1 tablet (30 mg total) by mouth once daily.    labetalol (NORMODYNE) 300 MG tablet Take 1 tablet (300 mg total) by mouth every 8 (eight) hours.    lacosamide (VIMPAT) 50 mg Tab Take 1 tablet (50 mg total) by mouth every 12 (twelve) hours.    levETIRAcetam (KEPPRA) 500 MG Tab Take 1 tablet (500 mg total) by mouth 2 (two) times daily.    ondansetron (ZOFRAN-ODT) 4 MG TbDL Take 1 tablet (4 mg total) by mouth every 6 (six) hours as needed.    pantoprazole (PROTONIX) 40 MG tablet Take 1 tablet (40 mg total) by mouth once daily.    polyethylene glycol (GLYCOLAX) 17 gram PwPk Take 17 g by mouth 2 (two) times daily.    senna-docusate 8.6-50 mg (PERICOLACE) 8.6-50 mg per tablet Take 2 tablets by mouth once daily.     sevelamer HCl (RENAGEL) 800 MG Tab Take 800 mg by mouth 3 (three) times daily with meals.    sodium chloride 1 gram tablet Take 2 g by mouth.    tacrolimus (PROGRAF) 5 MG Cap Take 1 capsule (5 mg total) by mouth every 12 (twelve) hours.    verapamil (VERELAN) 240 MG C24P Take 1 capsule (240 mg total) by mouth every evening.     No current facility-administered medications for this visit.        There were no vitals filed for this visit.  Physical Exam   Constitutional: She is oriented to person, place, and time. She appears well-developed. No distress.   HENT:   Head: Normocephalic and atraumatic.   Mouth/Throat: No oropharyngeal exudate.   Eyes: Pupils are equal, round, and reactive to light.   Neck: Normal range of motion.   Cardiovascular: Normal rate and regular rhythm.   Murmur heard.  Pulmonary/Chest: Effort normal and breath sounds normal. No respiratory distress. She has no wheezes.   Abdominal: She exhibits no distension. There is no tenderness. There is no rebound.   Musculoskeletal: She exhibits no edema.   Lymphadenopathy:     She has no cervical adenopathy.   Neurological: She is alert and oriented to person, place, and time. No cranial nerve deficit.   Skin: Skin is warm and dry.   She has AV fistula in left forearm   Psychiatric: She has a normal mood and affect.       Component      Latest Ref Rng & Units 5/30/2018 5/21/2018 5/16/2018   WBC      3.90 - 12.70 K/uL 5.14     RBC      4.00 - 5.40 M/uL 2.82 (L)     Hemoglobin      12.0 - 16.0 g/dL 7.7 (L)     Hematocrit      37.0 - 48.5 % 23.4 (L)     MCV      82 - 98 fL 83     MCH      27.0 - 31.0 pg 27.3     MCHC      32.0 - 36.0 g/dL 32.9     RDW      11.5 - 14.5 % 17.5 (H)     Platelets      150 - 350 K/uL 101 (L)     MPV      9.2 - 12.9 fL 9.6     Gran # (ANC)      1.8 - 7.7 K/uL 3.4     Lymph #      1.0 - 4.8 K/uL 0.8 (L)     Mono #      0.3 - 1.0 K/uL 0.4     Eos #      0.0 - 0.5 K/uL 0.5     Baso #      0.00 - 0.20 K/uL 0.01     Gran%       38.0 - 73.0 % 65.1     Lymph%      18.0 - 48.0 % 16.0 (L)     Mono%      4.0 - 15.0 % 7.6     Eosinophil%      0.0 - 8.0 % 10.1 (H)     Basophil%      0.0 - 1.9 % 0.2     Differential Method       Automated     Sodium      136 - 145 mmol/L 136     Potassium      3.5 - 5.1 mmol/L 3.8     Chloride      95 - 110 mmol/L 104     CO2      23 - 29 mmol/L 24     Glucose      70 - 110 mg/dL 98     BUN, Bld      6 - 20 mg/dL 17     Creatinine      0.5 - 1.4 mg/dL 4.1 (H)     Calcium      8.7 - 10.5 mg/dL 8.4 (L)     Total Protein      6.0 - 8.4 g/dL 7.0     Albumin      3.5 - 5.2 g/dL 2.2 (L)     Total Bilirubin      0.1 - 1.0 mg/dL 0.4     Alkaline Phosphatase      55 - 135 U/L 465 (H)     AST      10 - 40 U/L 32     ALT      10 - 44 U/L 30     Anion Gap      8 - 16 mmol/L 8     eGFR if African American      >60 mL/min/1.73 m:2 16 (A)     eGFR if non African American      >60 mL/min/1.73 m:2 14 (A)     Protein, Serum      6.0 - 8.4 g/dL   7.8   Albumin grams/dl      3.35 - 5.55 g/dL   3.45   Alpha-1 grams/dl      0.17 - 0.41 g/dL   0.43 (H)   Alpha-2 grams/dl      0.43 - 0.99 g/dL   0.69   Beta grams/dl      0.50 - 1.10 g/dL   0.80   Gamma grams/dl      0.67 - 1.58 g/dL   2.43 (H)   EBV VCA IgG      <18.0 U/mL   204.0 (H)   EBV VCA IgM      <36.0 U/mL   36.0 (H)   EBV Early Antigen Ab, IgG      <9.0 U/mL   124.0 (H)   EBV Nuclear Ag Ab      <18.0 U/mL   66.0 (H)   Immunofix Interp.         SEE COMMENT   CMV IgG Interpretation         Reactive (A)   EBV DNA, PCR      Undetected IU/mL  Undetected      5/16/18 serum ABDIEL: No monoclonal peaks identified  5/16/18 SPEP: Normal total protein. Increased gamma globulin, polyclonal. No paraprotein bands identified.     Component      Latest Ref Rng & Units 5/16/2018   Iron      30 - 160 ug/dL 54   Transferrin      200 - 375 mg/dL 197 (L)   TIBC      250 - 450 ug/dL 292   Saturated Iron      20 - 50 % 18 (L)   IgG - Serum      650 - 1600 mg/dL 2765 (H)   IgA      40 - 350 mg/dL 335    IgM      50 - 300 mg/dL 99   Kappa Free Light Chains      0.33 - 1.94 mg/dL 91.96 (H)   Lambda Free Light Chains      0.57 - 2.63 mg/dL 49.53 (H)   Kappa/Lambda FLC Ratio      0.26 - 1.65 1.86 (H)   Fibrinogen      182 - 366 mg/dL 347   Uric Acid      2.4 - 5.7 mg/dL 4.7   LD      110 - 260 U/L 165   Retic      0.5 - 2.5 % 3.6 (H)   Haptoglobin      30 - 250 mg/dL 122   Ferritin      20.0 - 300.0 ng/mL 1,062 (H)   Folate      4.0 - 24.0 ng/mL 6.1   Vitamin B-12      210 - 950 pg/mL 741   TSH      0.400 - 4.000 uIU/mL 3.016   Free T4      0.71 - 1.51 ng/dL 1.31       Assessment:     Acute on chronic Thrombocytopenia  Nontraumatic subcortical hemorrhage of left cerebral hemisphere  Pancytopenia  Uncontrolled hypertention/Renovascular hypertension    Plan:     Acute on chronic Thrombocytopenia  - Platelet count was normal until Nov 2014. It has declined since then. It ranged between 79k -155 k in 2015, 64k- 122k in 2016, 50k -113k in 2017. Most recent platelet count is 57k on  6/24/19. No bleeding diathesis. HBV, HCV serologies negative in 2016.  - HIV negative in .98282. FELIX negative in 2015. No recent medication changes. She has been on Prograf since 1992.  B12, LDH, folate, TFT, haptoglobin all normal in May 2018. EBV DNA by PCR not detected. - CMV Igg positive. Serum quantitative immunoglobulins showed elevated IgG. SPEP/ABDEIL normal. sFLC ratio slightly elevated.  - Unlikely to be MAHA/TTP/HUS, although Tacrolimus is known to be associated with TTP.She has lost 30lb weight since early 2015. CT done in January 2018 did not reveal any mass/adenopathy. However, it was a non-contrast study.  - She has chronic splenomegaly, which can also be contributing to cytopenias due to sequestration. Fibrinogen is normal. PTT, INR not permitted to be ordered. Her latest platelet count (on 5/16) was [id number].it is likely that the true count is higher, in view of splenic sequestration.      - Most surgeries can be done safely at  "platelet count> 485058, as long as other coagulation parameters are normal. She will follow here in 2 months  - plts 62K today  - etiology likely hypersplenism  - given dexamthasone 40 mg x 4 days while inpatient with response   - noted to have a spleen previously of 19.7 cm on US Abdomen on 10/23/18  -  IR consult for possible splenic artery embolization considered while inpatient if no response to steroids. Given improvement in plts, determined no need for procedure  -  chronically, the plts have been noted to be low since 01/2015, with plt counts  Ranging from 40 K to 122 K.  - On day of her surgery, the pt was noted to have plt count of 56 K and pt has been flucutating in between before trending downwards  -  pt had 14plt units, 1 cryo, 1 prBC during admission; the plt transfusion involved 2 units of plts on 5/8/19. Lack of complete plt response previously to transfusions is likely secondary to alloimunization.  - peripheral smear does not reveal schisctocytes, giant plts, or plt clumping  - H2 blockers stopped  - HIT antibody negative  - was last seen in clinic by Dr. Ortega in May 2018. Was supposed to f/u 2 mos later, but was lost to follow-up  - Discussed case and recent hospitalization with Dr. Ortega. Dr. Ortega would like to schedule BMBx. Discussed this with patient/mother and both refused. Mother stated, "no more procedures. She needs to heal." Discussed rationale but they still refused.    Nontraumatic subcortical hemorrhage of left cerebral hemisphere  - was hospitalized 4/22/19 - 5/21/19 for excision of L calvarial lesion and cranioplasty with parenchymal hematoma evacuation  - see hospital course above   - site approximated with no sign of infection noted    Pancytopenia  - WBC 3.17; hgb 9.3; plt 62K  - reticulocytes 3.9. Patient with recent blood loss.  - anemia may be 2/2 ESRD, but given pancytopenia, would be best to check BMBx. Discussed BMBx rationale with patient/mother, but both refused. Will " schedule f/u with Dr. Ortega to devise plan for managing thrombocytopenia     Uncontrolled hypertention/Renovascular hypertension   - /110  - is chronically elevated per patient/mother  - takes clonidine, hydralazine, ibersartan, and labetolol at home  - htn likely 2/2 ESRD  - likely contributed to subcortical bleed  - discussed sending patient to ED with Dr. Ortgea. Likely chronic, pts > 50K, and asymptomatic, so decision was made not to send pt to ED    Follow-up  CBC, CMP, and f/u with Dr. Ortega on 7/10/19      Distress Screening Results: Psychosocial Distress screening score of Distress Score: 0 noted and reviewed. No intervention indicated.

## 2019-06-26 PROBLEM — D69.3 CHRONIC IDIOPATHIC THROMBOCYTOPENIA: Status: ACTIVE | Noted: 2019-01-01

## 2019-06-26 NOTE — TELEPHONE ENCOUNTER
----- Message from Lei Pinedo MD sent at 3/26/2019  4:42 PM CDT -----  Results reviewed    
Letter sent, labs stable and no medication changes are needed. Repeat labs due 4/8/19.    
Private car

## 2019-06-26 NOTE — LETTER
June 26, 2019      Zeenat Almanza MD  [address]  [facility] [address]           [facility]  [address]  [facility] [address]  Phone: [phone]          Patient: Holly Patel   MR Number: [id number]   YOB: 1990   Date of Visit: 6/26/2019       Dear Dr. Zeenat Almanza:    Thank you for referring Holly Patel to me for evaluation. Attached you will find relevant portions of my assessment and plan of care.    If you have questions, please do not hesitate to call me. I look forward to following Holly Patel along with you.    Sincerely,    Carol Mckinney, NP    Enclosure  CC:  No Recipients    If you would like to receive this communication electronically, please contact [email or web address] or [phone] to request more information on [employer] Link access.    For providers and/or their staff who would like to refer a patient to [facility], please contact us through our one-stop-shop provider referral line, [facility], at [phone].    If you feel you have received this communication in error or would no longer like to receive these types of communications, please e-mail [email or web address]

## 2019-06-27 NOTE — TELEPHONE ENCOUNTER
Letter sent, labs stable and no medication changes are needed. Repeat labs due 7/29/19 per Dr. Pinedo.

## 2019-06-27 NOTE — LETTER
June 27, 2019    Holly Patel  [address] [place] [id number]          Dear Holly Patel:  MRN: [id number]    This is a follow up to your recent labs, your lab results were stable.  There are no medicine changes.  Please have your labs drawn again on 7/29/19.      If you cannot have your labs drawn on the scheduled date, it is your responsibility to call the transplant department to reschedule.  To reschedule or make an appointment, please as to speak to or leave a message for my assistant, Tara Pollack or [place], at [phone].  When leaving a message for Tara Pollack Angela or myself, we ask that you leave a brief message regarding your request.    Sincerely,    Violeta Francis, RN, BSN, [facility]  Liver Transplant Coordinator  [facility]  [address] [place] [id number]  [phone]

## 2019-07-03 NOTE — PROGRESS NOTES
Transplant Recipient Adult Psychosocial Assessment    Holly Patel  [address]  [facility] [address]  Telephone Information:   Mobile [phone]   Home  [phone] (home)  Work  There is no work phone number on file.  E-mail  [email or web address]    Sex: female  YOB: 1990  Age: 28 y.o.    Encounter Date: 7/3/2019  [place] Citizen: yes  Primary Language: English   Needed: no    Emergency Contact:  Name: Myrna Randolph  Relationship: mother  Address: Same as pt  Phone Numbers:  [phone] (mobile)    Family/Social Support:   Number of dependents/: Pt reports Wendyleydi (10) and Rowdy (8). Pt reports her father and sister will assist with care as needed.  Marital history: Pt reports never being . Pt reports no current significant other.  Other family dynamics: Pt reports parents: Myrna Randolph and River Gonzales; 2 sisters: Jesika and Yulissa; 2 brothers: Shay and River; and aunt: Fany. Pt identifies all family members as supportive.    Household Composition:  Name: Holly Patel  Age: 28  Relationship: patient  Does person drive? no    Name: Mervin Randolph  Age: 69  Relationship: mother  Does person drive? yes    Name: Rachel Patel  Age: 10 and 8 (respectively)  Relationship: children  Does person drive? no    Do you and your caregivers have access to reliable transportation? yes  PRIMARY CAREGIVER: Myrna Randolph (mother) will be primary caregiver, phone number [phone].      provided in-depth information to patient and caregiver regarding pre- and post-transplant caregiver role.   strongly encourages patient and caregiver to have concrete plan regarding post-transplant care giving, including back-up caregiver(s) to ensure care giving needs are met as needed.    Patient and Caregiver states understanding all aspects of caregiver role/commitment and is able/willing/committed to being caregiver to the fullest extent necessary.    Patient  "and Caregiver verbalizes understanding of the education provided today and caregiver responsibilities.         remains available. Patient and Caregiver agree to contact  in a timely manner if concerns arise.      Able to take time off work without financial concerns: yes.     Additional Significant Others who will Assist with Transplant:  Name: Jesika Randolph  Age: 49  Phone: [phone]  City: [place] State: [place]  Relationship: sister  Does person drive? yes    Name: Yulissa Randolph  Age: 46  Phone: [phone]  City: [place]: [place]  Relationship: sister  Does person drive? yes     Name: River Gonzales  Age: 60  Phone: [phone]  City: [place]: [place]  Relationship: father  Does person drive? yes    Name: David Smith" Timur  Age: 59  Phone: [phone]  City: [place]: [place]  Relationship: aunt  Does person drive? yes    Living Will: no  Healthcare Power of : no  Advance Directives on file: <<no information> per medical record.  Verbally reviewed LW/HCPA information.   provided patient with copy of LW/HCPA documents and provided education on completion of forms.    Living Donors: Yes.  Name: Myrna Randolph (mother), Kaz Schmidt (nephew), + other family members. Education and resource information given to patient. Today, pt and mother reports that mother is not a candidate, but her nephew Kaz could be if he could lose weight.     Highest Education Level: High School (9-12) or GED (11th)  Reading Ability: 11th grade  Reports difficulty with: seeing and reprots blurry vision  Learns Best By:  a combination of verbal, written, and tactile instructions.    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 After pt's surgery, patient needed PT/OT and SLP HH and was in [facility]. Today, patient reports she can do things around the house, shower by herself, cook, clean, she can not dive yet, but wants to. Pt reports she does " NOT manage her meds currently, but that her mother does at this time. Pt is very motivated to learn. Pt is able to remember a lot of things, but still struggles with meds. Pt reports that her neurologist believes she will regain her memory, but it could take a while. Mom appears dedicated and available to managing meds and has been since her neuro episode. Per consult with pt's referral nurse Lien, one of pt's sister had spoke with Lien and reported concerns for mothers ability to care for patient. Today, SW does not see any concerns for mother's memory or ability to care for patient post transplant.        Status: no  VA Benefits: no     Working for Income: No  If no, reason not working: Disability  Patient is disabled.  Prior to disability, patient  was employed as an  at the [employer]..    Spouse/Significant Other Employment: Pt reports no current significant other. Pt's mother reports working as a  at [employer] Full-time. Pt's father reports he is disabled.    Disabled: yes: date disability began: 2015, due to: ESRD. Pt and mom are unsure if she has any other disabilities since neuro event, but will check in to it. Patient is very motivated to return to work after transplant.     Monthly Income:  SS Disability: $894  Food [place]: $44  Other household members total: $600 (son's disability check)  Other: $1732 (mom's wages)     Able to afford all costs now and if transplanted, including medications: yes Pt's mother reports she can assist. Pt reports wanting to return to work. Patient confirmed understanding that her Medicare will end 3 years after transplant and her disability could end 1 year after transplant. Pt and mother confirm that she will have assistance from family, but is hoepful that she will be able to maintain a job and is very motivated to return to work and have an independent life.   Patient and Caregiver verbalizes understanding of  personal responsibilities related to transplant costs and the importance of having a financial plan to ensure that patients transplant costs are fully covered.       provided fundraising information/education. Patient and Caregiververbalizes understanding.   remains available.    Insurance:   Payor/Plan Subscr  Sex Relation Sub. Ins. ID Effective Group Num   1. [place]ICARE - [place]* SYEDA BANKS * 1990 Female  [id number] 16                                    [address]   2. [place] SYEDA BANKS * 1990 Female  [id number]* 16                                    [address] [address]     Primary Insurance (for UNOS reporting): Public Insurance - Medicare FFS (Fee For Service)  Secondary Insurance (for UNOS reporting): Public Insurance - Medicaid  Patient and Caregiver verbalizes clear understanding that patient may experience difficulty obtaining and/or be denied insurance coverage post-surgery. This includes and is not limited to disability insurance, life insurance, health insurance, burial insurance, long term care insurance, and other insurances.      Patient and Caregiver also reports understanding that future health concerns related to or unrelated to transplantation may not be covered by patient's insurance.  Resources and information provided and reviewed.     Patient and Caregiver provides verbal permission to release any necessary information to outside resources for patient care and discharge planning.  Resources and information provided are reviewed.      Dialysis Adherence: Patient reports being on hemodialysis in center, attending all dialysis appointments, and staying for the entire course of treatment. Please see separate note for compliance check. Patient has a history of non compliance with her immuno previously, however, today pt reports that she is very compliant with her meds. SW consulted with pt's liver nurse Violeta who reports pt has come a long way  and feels that she could be a successful kidney transplant patient with family support and motivation.     Infusion Service: patient utilizing? no  Home Health: patient utilizing? yes Pt is [facility]. Pt completed PT and OT.  DME: yes BP Cuff  Pulmonary/Cardiac Rehab: Pt denies   ADLS:  Pt reports she now can clean, cook, shower, and do most daily activities. Pt can not drive or manage meds at this time. Pt's mother manages pt's meds for now. Pt ultimately plans to manage meds in the future.     Adherence:   Pt reports suitable adherence with medications, dialysis, and health regimen.  Adherence education and counseling provided.     Per History Section:  Past Medical History:   Diagnosis Date    Anemia in ESRD (end-stage renal disease) 10/12/2015    dialysis tues, thursday, sat; access left arm    Chronic rejection of liver transplant 3/22/2016    Depression     Encounter for blood transfusion     ESRD on hemodialysis 9/30/2015    History of recent hospitalization 05/2018    pneumonia    History of splenomegaly 4/12/2016    Immunosuppressed 8/5/2017    Iron deficiency anemia secondary to inadequate dietary iron intake 8/16/2017    She [facility] iron [facility].   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)    Moderate protein-calorie malnutrition 8/16/2017    MRSA bacteremia 8/6/2017    Pneumonia     Prophylactic immunotherapy 8/4/2014    Renovascular hypertension 10/2/2015    Secondary hyperparathyroidism 8/5/2017    Seizures     Sialadenitis 3/21/2018    Thrombocytopenia 4/12/2016     Social History     Tobacco Use    Smoking status: Never Smoker    Smokeless tobacco: Never Used   Substance Use Topics    Alcohol use: No     Social History     Substance and Sexual Activity   Drug Use Never     Social History     Substance and Sexual Activity   Sexual Activity Never    Partners: Male        Per Today's Psychosocial:  Tobacco: none, patient denies any use.  Alcohol: none, patient denies any use.  Illicit Drugs/Non-prescribed Medications: none, patient denies any use.    Patient and Caregiver states clear understanding of the potential impact of substance use as it relates to transplant candidacy and is aware of possible random substance screening.  Substance abstinence/cessation counseling, education and resources provided and reviewed.     Arrests: Pt reports arrests when younger, but no alf time. Pt reports no pending/outstanding legal issues.  DWI/Treatment/Rehab: patient denies    Psychiatric History:    Mental Health: Pt reports some depression to recent situation. Pt reports that her depression has never been out of control and mostly situational. Pt reports she does not feel she needs medications and has coping skills to mange without meds. SW reviewed severe depression symptoms and encouraged pt to please contact myself or another mental health professional if she did ever develop those symptoms. Pt is agreeable. SW does recommend psych if needed, but it is not required pre-transplant listing.   Psychiatrist/Counselor: Pt denies seeing a mental health professional and reports being open to seeing the psych department for talk therapy if necessary.  Medications:  Pt denies taking medications for mental health reasons.  Suicide/Homicide Issues: Pt denies any history of or current suicidal or homicidal ideations.    Safety at home: Pt reports no current or history of safety concerns in household; including mental, physical, verbal, or sexual abuse.    Knowledge: Patient and Caregiver states having clear understanding and realistic expectations regarding the potential risks and potential benefits of organ transplantation and organ donation and agrees to discuss with health care team members and support system members, as well as to utilize available resources and express questions and/or  concerns in order to further facilitate the pt informed decision-making.  Resources and information provided and reviewed.    Patient and Caregiver is aware of [facility]'s affiliation and/or partnership with agencies in home health care, LTAC, SNF, [facility], and other hospitals and clinics.    Understanding: Patient and Caregiver reports having a clear understanding of the many lifetime commitments involved with being a transplant recipient, including costs, compliance, medications, lab work, procedures, appointments, concrete and financial planning, preparedness, timely and appropriate communication of concerns, abstinence (ETOH, tobacco, illicit non-prescribed drugs), adherence to all health care team recommendations, support system and caregiver involvement, appropriate and timely resource utilization and follow-through, mental health counseling as needed/recommended, and patient and caregiver responsibilities.  Social Service Handbook, resources and detailed educational information provided and reviewed.  Educational information provided.    Patient and Caregiver also reports current and expected compliance with health care regime and states having a clear understanding of the importance of compliance.      Patient and Caregiver reports a clear understanding that risks and benefits may be involved with organ transplantation and with organ donation.       Patient and Caregiver also reports clear understanding that psychosocial risk factors may affect patient, and include but are not limited to feelings of depression, generalized anxiety, anxiety regarding dependence on others, post traumatic stress disorder, feelings of guilt and other emotional and/or mental concerns, and/or exacerbation of existing mental health concerns.  Detailed resources provided and discussed.      Patient and Caregiver agrees to access appropriate resources in a timely manner as needed and/or as recommended, and to communicate concerns  appropriately.  Patient and Caregiver also reports a clear understanding of treatment options available.     Patient and Caregiver received education in a group setting.   reviewed education, provided additional information, and answered questions.    Feelings or Concerns: Patient and Caregiver did not express any concerns at this time.    Coping: Pt reports coping well with the transplant process at this time and reports playing cards and spending time with her children, family, and friends as ways to cope. Pt reports Evangelical home as [facility] in [place], [place] with Toshia Pineda presiding.     Goals: Pt reports going back to work, eating right, gaining weight, being healthy, and getting off dialysis as goals for post transplant..  Patient referred to Vocational Rehabilitation.    Interview Behavior: Patient and Caregiver presents as alert and oriented x 4, pleasant, good eye contact, well groomed, recall good, concentration/judgement good, average intelligence, calm, communicative, cooperative and asking and answering questions appropriately. Pt presents with Myrna Randolph and , pt's parents at pt's request.         Transplant Social Work - Candidacy  Assessment/Plan:     Psychosocial Suitability: Patient presents as a suitable candidate for kidney transplant at this time. Based on psychosocial risk factors, patient presents as low risk, due to suitable caregiver plan in place for self and minor children, understanding of the transplan process due to previous liver transplant, suitable financial plan in place with assistance form pt's mother and desire to return to work, and suitable dialysis adherence. Despite patient recent neurological event, patient appears to be stable from a psychosocial perspective and memory is adequate.     Recommendations/Additional Comments:  Pt is still pending a compliance check, but until more information is gathered it does not change the current  suitability. OFELIA recommends that pt conduct fundraising to assist pt with pay for cost of medication, food,gas, and other transplant related expenses. SW recommends that pt remain free of all tobacco,ETOH, and substance use. SW remains available to assist with any concerns that may arise as pt navigates through the transplant process.        Nan Shell LMSW

## 2019-07-03 NOTE — TELEPHONE ENCOUNTER
----- Message from Marciano Caba sent at 7/3/2019 10:25 AM CDT -----  Contact: SYEDA BANKS [[id number]]  Name of Who is Calling: SYEDA BANKS [[id number]]      What is the request in detail: Would like to speak with staff in regards to scheduling re-pap. Please advise       Can the clinic reply by [facility]: no      [facility]: [phone]

## 2019-07-03 NOTE — PROGRESS NOTES
"   Transplant Nephrology  Kidney Transplant Recipient Evaluation    Referring Physician: Lei Brown  Current Nephrologist:     Subjective:   CC:  Initial evaluation of kidney transplant candidacy.    HPI:  Ms. Patel is a 28 y.o. year old Black or  female who has presented to be evaluated as a potential kidney transplant recipient.  She has ESRD presumed secondary to CNI toxicity +/- HTN. She denies having a native kidney biopsy. Patient is currently on hemodialysis started on 10/6/15. Patient is dialyzing on TTS schedule.  Patient reports that she is tolerating dialysis well.. She has a LUE AV fistula for dialysis access. She dialyzes for 3 hours and 30 minutes and will not have hypotension. She doesn't know her dry weight. She doesn't know how much she gains in between sessions. Denies any issues with AVF thrombosis or prolonged bleeding after needle removal. She has no residual UOP. Last blood product transfusion was in May 2019.     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     She was diagnosed with HTN in 2015. Mother thinks it started because of her renal dysfunction. States her BPs are usually elevated in the 150-160s/110s. BPs in clinic today severely elevated and patient reported that she took all of her anti-hypertensive medications.     She started having seizures in 2018 - mother states it was all related to HTN.     She had prolonged hospitalization from 4/22/19 to 5/21/19 (inpatient) with discharge summary stating:  "The patient was admitted to [facility] for excision of L calvarial lesion and cranioplasty with clot evaculation on 4/22. She was continued on vimpat and keppra. She is to receive an additional 500 mg dose of keppra after HD on HD days. The patient had issues with thrombocytopenia post operatively. She has had issues with chronic thrombocytopenia but due to her new neurosurgery, she was transfused platelets to maintain a " "goal of > 60. She was stepped down to medicine eventually. Most of her issues post operatively were related to her BP and platelets. Heme onc was consulted to assist with her thrombocytopenia and eventually she was given dexamethasone 40 mg daily x 4 days and her platelets did respond. Initially the thought was that she may need radiation or splenic embolization if this fails but the need was negated as her platelets responded. Neurosurgery final recs was for platelet goal > 40k. Her BP continued to be challenging and this was discussed with cardiology and with nephrology. Cardiology recommended that nephrology manage her BPs as she is ESRD. Nephrology stated that the patient suffers with poorly controlled BP and will try to see if there are any other options but for now to continue medical management. Imdur was added to her regimen but she still maintained elevated but entirely asymptomatic. She will follow up with NS as scheduled. She worked with [facility] and was recommended for rehab. She was accepted to [facility]."    She was last seen in initial RR clinic on 9/26/18 and was presented to selection committee on 6/19/19 with the decision being:   "Unable to determine transplant candidacy at this time due to need for hematology consult due to pancytopenia as it related to additional immunosuppression, follow up visit in clinic to assess functional status, and recovery from crainiotomy, follow up visit with social work to assess caregiver support and cognitive status.  Also needs updated pap smear.  Need to consult hem-onc for pancytopenia and as it relates to future immunosuppression for kidney transplantation. Needs optimization of PTH and BP as well - PTH was 1429 from 9/26/18 and BP was 215/136 at time of initial RR clinic visit dated 9/26/18."    Patient has made significant improvements in functional status and memory. She answered questions about her health herself and was very much engaged during this " writer's encounter. She was able to state her MRN accurately on her own today but states she has trouble remembering her medications. Explained to patient and mother that we would need to ensure she has adequate caregiver support due to still some memory deficits.     She is accompanied to visit by her mother and 15 yo niece.     Functional Status: She was getting PT and OT - mother states they came only for 2 weeks after she was discharged in May 2019. Mother reports that speech therapy and nurse comes once a week to monitor her BP. Denies having falls. She states she will go walking for ~40 minutes a day. She denies using cane, walker, wheelchair, scooter. She lives in a 1 story house without any stairs with her mother and two kids (8 and 10 yo). She states her mother does everything except cooking breakfast and grocery shopping. With the activity that she does she denies any symptoms of chest pain or claudication but states she will get SOB walking in [employer] for a long time - denies having breathing issues walking around her house. Patient was able to walk with this writer from clinic to the 2nd floor using the stairwell closest to the renal transplant clinic and back to clinic exam room without any MENDOSA, chest pain, or claudication.    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)    Moderate protein-calorie  malnutrition 2017    MRSA bacteremia 2017    Pneumonia     Prophylactic immunotherapy 2014    Renovascular hypertension 10/2/2015    Secondary hyperparathyroidism 2017    Seizures     Sialadenitis 3/21/2018    Thrombocytopenia 2016       Past Surgical History:   Past Surgical History:   Procedure Laterality Date    BIOPSY, LIVER, TRANSJUGULAR APPROACH N/A 2018    Performed by [facility] at [facility] OR 2ND FLR    BIOPSY-LIVER N/A 2017    [facility]    BIOPSY-LIVER N/A 3/22/2016    [facility]     SECTION      x 2    CONIZATION-CERVICAL-LEEP N/A 6/15/2018    Performed by [facility] at [facility] OR    [id number]; upper extremity Left 2015    Performed by Idalia Diaz MD at [facility] OR 2ND FLR    CRANIOPLASTY  2019    Performed by Jose Luis Powell MD at [facility] OR 2ND FLR    CRANIOTOMY-- left crani for clot evac with microscrope Left 2019    Performed by Jose [facility] at [facility] 2ND FLR    EMBOLIZATION, BLOOD VESSEL N/A 2018    Performed by [facility]ren Ramos [facility] at [facility] CATH LAB    Exam Under Anesthesia N/A 2018    Performed by [facility] at [facility] OR 2ND FLR    Exam under anesthesia N/A 2018    Performed by [facility] at [facility] OR    Exam under anesthesia (ADD ON ) N/A 2018    Performed by NICKIE Alvarez MD at [facility] OR    Exam under anesthesia -cervical suturing  N/A 2018    Performed by Lei Sims III, MD at [facility] OR    EXCISION, NEOPLASM, SKULL with Cranioplasty Left 2019    Performed by [facility] at [facility] OR 2ND FLR    FISTULOGRAM Left 2015    Performed by Idalia [facility] MD at [facility] CATH LAB    LIVER BIOPSY      LIVER TRANSPLANT  1992    PARATHYROIDECTOMY, Minimally Invasive Bilateral Exploration Bilateral 3/27/2019    Performed by Ashley  [facility] at [facility] OR 2ND FLR    SUTURE REPAIR,CERVIX  6/19/2018    Per[facility] at [facility] OR    TUBAL LIGATION  2010       Medications:   Current Outpatient Medications   Medication Sig Dispense Refill    bacitracin 500 unit/gram ointment Apply topically as needed.  0    bisacodyl (DULCOLAX) 10 mg Supp Place 1 suppository (10 mg total) rectally once daily.  0    calcitRIOL (ROCALTROL) 0.5 MCG Cap Take 2 capsules (1 mcg total) by mouth 2 (two) times daily. 120 capsule 11    cloNIDine (CATAPRES) 0.1 MG tablet Take 3 tablets (0.3 mg total) by mouth 3 (three) times daily. 30 tablet 11    epoetin anca-epbx (RETACRIT) 3,000 unit/mL injection Inject 1 mL (3,000 Units total) into the skin every Mon, Wed, Fri. 1 vial 3    hydrALAZINE (APRESOLINE) 100 MG tablet Take 1 tablet (100 mg total) by mouth every 8 (eight) hours. 90 tablet 11    irbesartan (AVAPRO) 300 MG tablet Take 1 tablet (300 mg total) by mouth every morning. 30 tablet 3    isosorbide mononitrate (IMDUR) 30 MG 24 hr tablet Take 1 tablet (30 mg total) by mouth once daily. 30 tablet 11    lacosamide (VIMPAT) 50 mg Tab Take 1 tablet (50 mg total) by mouth every 12 (twelve) hours. 60 tablet 2    levETIRAcetam (KEPPRA) 500 MG Tab Take 1 tablet (500 mg total) by mouth 2 (two) times daily. 60 tablet 11    ondansetron (ZOFRAN-ODT) 4 MG TbDL Take 1 tablet (4 mg total) by mouth every 6 (six) hours as needed. 40 tablet 3    pantoprazole (PROTONIX) 40 MG tablet Take 1 tablet (40 mg total) by mouth once daily. 30 tablet 11    polyethylene glycol (GLYCOLAX) 17 gram PwPk Take 17 g by mouth 2 (two) times daily. 60 packet 3    senna-docusate 8.6-50 mg (PERICOLACE) 8.6-50 mg per tablet Take 2 tablets by mouth once daily.      sevelamer HCl (RENAGEL) 800 MG Tab Take 800 mg by mouth 3 (three) times daily with meals.      sodium chloride 1 gram tablet Take 2 g by mouth.      tacrolimus (PROGRAF) 5 MG Cap Take 1 capsule (5 mg total) by  mouth every 12 (twelve) hours. 60 capsule 11    verapamil (VERELAN) 240 MG C24P Take 1 capsule (240 mg total) by mouth every evening. 30 capsule 11     No current facility-administered medications for this visit.        Social History:  Social History     Socioeconomic History    Marital status: Legally      Spouse name: Not on file    Number of children: Not on file    Years of education: Not on file    Highest education level: Not on file   Occupational History    Not on file   Social Needs    Financial resource strain: Not on file    Food insecurity:     Worry: Not on file     Inability: Not on file    Transportation needs:     Medical: Not on file     Non-medical: Not on file   Tobacco Use    Smoking status: Never Smoker    Smokeless tobacco: Never Used   Substance and Sexual Activity    Alcohol use: No    Drug use: Never    Sexual activity: Never     Partners: Male   Lifestyle    Physical activity:     Days per week: Not on file     Minutes per session: Not on file    Stress: Not on file   Relationships    Social connections:     Talks on phone: Not on file     Gets together: Not on file     Attends Denominational service: Not on file     Active member of club or organization: Not on file     Attends meetings of clubs or organizations: Not on file     Relationship status: Not on file   Other Topics Concern    Are you pregnant or think you may be? No    Breast-feeding No   Social History Narrative    Lives 2 kids. Her mom helps with kids.       Family History:   Family History   Problem Relation Age of Onset    Hypertension Mother     Hypertension Father     Hypertension Paternal Grandmother     Alzheimer's disease Paternal Grandmother     Heart disease Paternal Grandmother     Cancer Sister     Heart attack Maternal Uncle     No Known Problems Brother     No Known Problems Brother     No Known Problems Sister     No Known Problems Sister     Melanoma Neg Hx     Breast cancer  "Neg Hx     Colon cancer Neg Hx     Ovarian cancer Neg Hx     Kidney disease Neg Hx     Stroke Neg Hx        Review of Systems  Constitutional: +fatigue; Denies fever/chills, night sweats  EENT: +vision problems; Denies hearing problems, trouble swallowing.   Respiratory: +MENDOSA as mentioned above; Denies orthopnea, wheezing, hemoptysis, denies known TB exposure or history of positive TB skin test  Cardiovascular: Denies chest pain, palpitations, history of MI, history of stroke, history of DVT  Gastrointestinal: Denies abdominal pain, nausea/vomiting/diarrhea/constipation. Denies history of GI bleeding or ulcers.   Genitourinary: Denies history of kidney stones, recurrent UTI's, history of urinary obstruction, gross hematuria  Musculoskeletal: Denies trouble moving extremities, denies joint pain or swelling  Skin: Denies history of skin cancer, denies rash, ulcerations  Heme/onc: Denies any history of cancer, denies history of coagulopathies or bleeding disorders  Endocrine: Denies thyroid disease, unintentional weight loss/weight gain  Neurological: +seizures - last seizure was in March 2019 - mother thinks she has had 4 lifetime seizures; on Keppra; +tremors; +bad headaches when her BP was elevated but states she hasn't had headaches since craniotomy in April 2019; Denies dizziness, peripheral neuropathy  Psychiatric: +depression - denies SI/HI; Denies anxiety. Denies hallucinations or delusions.    Potential Donors: nephew and grandson      Objective:   Blood pressure (!) 210/142, pulse 73, temperature 97.6 °F (36.4 °C), temperature source Oral, resp. rate 16, height 5' 5" (1.651 m), weight 51.6 kg (113 lb 12.1 oz), SpO2 100 %.body mass index is 18.93 kg/m².    Physical Exam  General: No acute distress, well groomed, alert and oriented x 3  HEENT: Normocephalic, atraumatic, PERRLA, sclera anicteric, conjunctiva/corneas clear, EOM's intact bilaterally, external inspection of ears and nose normal, moist mucous " membranes, no oral ulcerations/lesions   Neck: Supple, symmetrical, trachea midline, no masses, no carotid bruits, no JVD, thyroid is normal without nodules or enlargement   Respiratory: Clear to auscultation bilaterally, respirations unlabored, no rales/rhonchi/wheezing   Cardiovacular: Regular rate and rhythm, S1, S2 normal, no murmurs, no rubs or gallops   Gastrointestinal: Soft, non-tender, bowel sounds normal, no masses, no palpable organomegaly, ?ascites  Extremities: No clubbing or cyanosis of upper extremities bilaterally, no pedal edema bilaterally; +2 bilateral radial pulses  Skin: warm and dry; no rash on exposed skin  Lymph nodes: Cervical and supraclavicular nodes normal   Neurologic: No focal neurologic deficits.   Musculoskeletal: moves all extremities without difficulty, FROM, 5/5 strength, ambulates without an assistive device  Psychiatric: Normal mood and affect. Responds appropriately to questions.  Access: LUE AVF +thrill/bruit     Labs:  Lab Results   Component Value Date    WBC 3.17 (L) 06/26/2019    HGB 9.3 (L) 06/26/2019    HCT 29.3 (L) 06/26/2019     06/26/2019    K 3.8 06/26/2019     06/26/2019    CO2 22 (L) 06/26/2019    BUN 31 (H) 06/26/2019    CREATININE 5.6 (H) 06/26/2019    EGFRNONAA 9.6 (A) 06/26/2019    GLUCOSE 96 11/24/2008    CALCIUM 8.7 06/26/2019    PHOS 2.6 (L) 05/27/2019    MG 2.0 05/24/2019    ALBUMIN 3.7 06/26/2019    AST 22 06/26/2019    ALT 24 06/26/2019    UTPCR 2.5 (H) 06/02/2015    .9 (H) 04/23/2019    TACROLIMUS 2.1 (L) 06/24/2019       Lab Results   Component Value Date    BILIRUBINUA Negative 01/29/2018     (H) 04/02/2019    AMYLASE 90 05/03/2019    LIPASE 33 05/03/2019    PROTEINUA 2+ (A) 01/29/2018    NITRITE Negative 01/29/2018    RBCUA 10 (H) 01/29/2018    WBCUA 23 (H) 01/29/2018       No results found for: HLAABCTYPE    Labs were reviewed with the patient.    Assessment:     1. ESRD on hemodialysis    2. Depression, unspecified  depression type    3. Chronic kidney disease-mineral and bone disorder    4. Brain tumor    5. Anemia in ESRD (end-stage renal disease)    6. Hyperparathyroidism due to end stage renal disease on dialysis    7. Hypertensive urgency    8. Immunosuppressed    9. Leukopenia, unspecified type    10. Prophylactic immunotherapy    11. Nontraumatic subcortical hemorrhage of left cerebral hemisphere    12. Liver replaced by transplant    13. Seizures    14. Thrombocytopenia    15. S/P craniotomy        Plan:     Transplant Candidacy:   After obtaining history and performing physical exam as well as reviewing available diagnostic studies, Ms. Patel is a suitable but higher risk kidney transplant candidate secondary to long term exposure to immunosuppression, pancytopenia, severally uncontrolled HTN despite being on 6 agents!, history of non-compliance.  Final determination of transplant candidacy will be made once workup is complete and reviewed by the selection committee.    Discussed with patient, mother, and niece at length regarding concern with her significantly elevated BP and recommended escorting her to the ED but mother refused.     Would recommend psych consult given depression but do not feel it needs to be a requirement for transplant candidacy.     Meets center eligibility for accepting HCV+ donor offer - will need to confirm with transplant hepatology whether she is medically appropriate to accept HCV donor kidney.  Patient educated on HCV+ donors. Holly Patel is willing to accept HCV+ donor offer - yes.  Patient is a candidate for KDPI > 85 kidney donor offer - no.    Commended patient on the improvements she has had already after her prolonged April/May 2019 hospitalizations and encouraged her to continue working on her strength/endurance.     Exercise: reminded Holly of the importance of regular exercise for weight management, blood sugar and blood pressure management.  I also explained  exercise has been shown to improve cardiovascular health, energy level, and sleep hygiene.  Lastly, I advised her that cardiovascular complications are leading cause of death and regular exercise can help lower this risk.    Encouraged her to work with her general nephrologist to optimize BP control.       Daylin Hoyt MD       [facility] Patient Status  Functional Status: 60% - Requires occasional assistance but is able to care for needs  Physical Capacity: No Limitations

## 2019-07-03 NOTE — PATIENT INSTRUCTIONS
1. Continue exercising and getting stronger   2. Work with your nephrologist to better control your blood pressure   3. If your repeat blood pressure in clinic is still elevated we will recommend escorting you to the ER  4. Would recommend seeing psych (depending on  assessment this may need to be a requirement for transplant)

## 2019-07-03 NOTE — PROGRESS NOTES
Pt was evaluated in transplant clinic this morning by Dr. Hoyt. Patient blood pressure was 210/142. I was asked to take patient's blood pressure sitting and standing. Patient was told her sitting blood pressure is 200/110. I advised patient, she will have to go to the ER to get her blood pressure decreased if standing blood pressure remains elevated. Patient's mother stated she ( ARIAN Patel ) will not be going to the ER. Patient was educated on risk of leaving hospital with high blood pressure high. Patient stated she is not going to the ER. Patients's mother stated, she lives like that, her pressure always high and we are not going to the ER. Patient proceeded to leave once second wilbur blood pressure was taken. Her second blood pressure, standing was 190/100. Dr. Hoyt was informed of patient not willing to go to the ER after blood pressure assess.

## 2019-07-03 NOTE — LETTER
July 3, 2019        Enrrique Moise  [address]  [facility] [place] [address]  Phone: [phone]  Fax: [phone]             [facility]  [address]  [facility] [address]  Phone: [phone]   Patient: Holly Patel   MR Number: [id number]   YOB: 1990   Date of Visit: 7/3/2019       Dear Dr. Enrrique Moise    Thank you for referring Holly Patel to me for evaluation. Attached you will find relevant portions of my assessment and plan of care.    If you have questions, please do not hesitate to call me. I look forward to following Holly Patel along with you.    Sincerely,    Daylin Hoyt MD    Enclosure    If you would like to receive this communication electronically, please contact [email or web address] or [phone] to request [facility] access.    [facility] is a tool which provides read-only access to select patient information with whom you have a relationship. Its easy to use and provides real time access to review your patients record including encounter summaries, notes, results, and demographic information.    If you feel you have received this communication in error or would no longer like to receive these types of communications, please e-mail [email or web address]

## 2019-07-08 NOTE — PROGRESS NOTES
Subjective:       Patient ID: Holly Patel is a 28 y.o. female.    Chief Complaint: Liver Transplant Follow-up    HPI  I saw this 28 year old  lady in the liver transplant clinic. She is currently on HD three times weekly because of hypertension induced and chronic immunosuppression- induced kidney failure.    Had been on home dialysis- recently switched to outpt HD  On HD since Oct 2015, home HD since Feb 2016.    - hypertensive emergency and tonic clonic seizure  - started on anti-epileptics    She had high LFTs in June 2017 and a liver biopsy showed stage 2-3 fibrosis as well as evidence of chronic rejection.      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  Denied kidney Tx for compliance but in evaluation again    She has certainly has a lot of undetectable tacrolimus blood levels but more recently this seems to have changed for the better.    She used to come to clinic with uncontrolled hypertension but today her BP was 116/57.    She has had 3 liver biopsies since 2016 and her latest one was in Nov 2018  - this did show chronic rejection/ductopenia but only stage 2 fibrosis.    Her imaging shows ascites but in the absence of obvious portal hypertension, this maybe related to her kidney failure.    Recent admission with hypertensive crisis  Discovered to have a brown tumor- known secondary hyperparathyroidism    Recent intraosseous hemangioma excision (4/22-5/21/2019 admission)  - this was a prolonged admission with hematoma evacuation/seizures.  - readmitted with headaches and hypertension    Liver biopsy: Nov 2018  No acute cellular rejection  Bile ducts with atrophic changes and periductular metaplasia (CK 7 immunostain), see comment  Rare lobular apoptotic bodies, no viral inclusions (CMV immunostain negative)  No steatosis  Portal fibrosis with septae (stage 2 of 4)  Trace hepatocyte iron, abundant macrophage iron  Iron and trichrome stains with appropriate controls  COMMENT:  Sections show bile ducts with atrophic type changes and the CK 7 highlights periportal ductular  metaplasia (more prominent than seen in the patient's prior biopsy, [id number], 6/16/2017); these findings are  suspicious for chronic rejection. Dr. LINNEA Tao has reviewed this case and concurs with the interpretation.    CT abdo: 1/17/2018  1.  Postsurgical changes consistent with liver transplantation with splenomegaly and moderate volume ascites.  2.  Otherwise no acute intra-abdominal abnormalities identified.  3.  Small bilateral pleural effusions, right greater than left with worsening patchy opacification/consolidation within the lower lobes may reflect worsening pulmonary edema versus atypical pneumonia.          MELD-Na score: 22 at 5/25/2019  4:35 AM  MELD score: 22 at 5/25/2019  4:35 AM  Calculated from:  Serum Creatinine: On dialysis. Using 4 mg/dL.  Serum Sodium: 139 mmol/L (Rounded to 137 mmol/L) at 5/25/2019  4:35 AM  Total Bilirubin: 0.7 mg/dL (Rounded to 1 mg/dL) at 5/23/2019 12:04 PM  INR(ratio): 1.2 at 5/23/2019 12:04 PM  Age: 28 years        Lab Results   Component Value Date    ALT 24 06/26/2019    AST 22 06/26/2019     (H) 04/02/2019    ALKPHOS 500 (H) 06/26/2019    BILITOT 0.8 06/26/2019     Past Medical History:   Diagnosis Date    Anemia in ESRD (end-stage renal disease) 10/12/2015    dialysis tues, thursday, sat; access left arm    Chronic rejection of liver transplant 3/22/2016    Depression     Encounter for blood transfusion     ESRD on hemodialysis 9/30/2015    History of recent hospitalization 05/2018    pneumonia    History of splenomegaly 4/12/2016    Immunosuppressed 8/5/2017    Iron deficiency anemia secondary to inadequate dietary iron intake 8/16/2017    She receives IV iron periodically at the [facility].   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)    Moderate protein-calorie malnutrition 8/16/2017    MRSA bacteremia  2017    Pneumonia     Prophylactic immunotherapy 2014    Renovascular hypertension 10/2/2015    Secondary hyperparathyroidism 2017    Seizures     Sialadenitis 3/21/2018    Thrombocytopenia 2016     Past Surgical History:   Procedure Laterality Date    BIOPSY, LIVER, TRANSJUGULAR APPROACH N/A 2018    Performed by [facility] at [facility] OR 2ND FLR    BIOPSY-LIVER N/A 2017    [facility]    BIOPSY-LIVER N/A 3/22/2016    Performed by [facility] at [facility] OR [facility]     SECTION      x 2    CONIZATION-CERVICAL-LEEP N/A 6/15/2018    Performed by [facility]am [facility]quin [facility] at [facility] [place]    [id number]; upper extremity Left 2015    Performed by Idalia Diaz MD at [facility] OR [facility]ND [facility]LR    CRANIOPLASTY  2019    Performed by Jose Luis Powell MD at [facility] OR [facility]    CRANIOTOMY-- left crani for clot evac with microscrope Left 2019    Performed by Jose Luis Powell MD at [facility] OR [facility]    EMBOLIZATION, BLOOD VESSEL N/A 2018    Performed by Aren Ramos MD at [facility] [place] CATH LAB    Exam Under Anesthesia N/A 2018    Performed by [facility]am [facility]quin MD at [facility] OR [facility]    Exam under anesthesia N/A 2018    Performed by [facility]am [facility] MD at [facility] [place]    Exam under anesthesia (ADD ON ) N/A 2018    Performed by NICKIE Alvarez MD at [facility] [place]    Exam under anesthesia -cervical suturing  N/A 2018    Performed by Lei Sims III, MD at [facility] [place]    EXCISION, NEOPLASM, SKULL with Cranioplasty Left 2019    Performed by [facility] Powell MD at [facility] OR [facility]    FISTULOGRAM Left 2015    Performed by Idalai Diaz MD at [facility] CATH LAB    LIVER BIOPSY      LIVER TRANSPLANT  1992    PARATHYROIDECTOMY, Minimally Invasive Bilateral Exploration Bilateral 3/27/2019    Performed by Ashley Guallpa MD at [facility] OR [facility]    SUTURE REPAIR,CERVIX  2018     Performed by [facility] at [facility] OR    TUBAL LIGATION  2010     Current Outpatient Medications   Medication Sig    bacitracin 500 unit/gram ointment Apply topically as needed.    calcitRIOL (ROCALTROL) 0.5 MCG Cap Take 2 capsules (1 mcg total) by mouth 2 (two) times daily.    cloNIDine (CATAPRES) 0.1 MG tablet Take 3 tablets (0.3 mg total) by mouth 3 (three) times daily.    hydrALAZINE (APRESOLINE) 100 MG tablet Take 1 tablet (100 mg total) by mouth every 8 (eight) hours.    irbesartan (AVAPRO) 300 MG tablet Take 1 tablet (300 mg total) by mouth every morning.    lacosamide (VIMPAT) 50 mg Tab Take 1 tablet (50 mg total) by mouth every 12 (twelve) hours.    levETIRAcetam (KEPPRA) 500 MG Tab Take 1 tablet (500 mg total) by mouth 2 (two) times daily.    ondansetron (ZOFRAN-ODT) 4 MG TbDL Take 1 tablet (4 mg total) by mouth every 6 (six) hours as needed.    pantoprazole (PROTONIX) 40 MG tablet Take 1 tablet (40 mg total) by mouth once daily.    tacrolimus (PROGRAF) 5 MG Cap Take 1 capsule (5 mg total) by mouth every 12 (twelve) hours.    verapamil (VERELAN) 240 MG C24P Take 1 capsule (240 mg total) by mouth every evening.    bisacodyl (DULCOLAX) 10 mg Supp Place 1 suppository (10 mg total) rectally once daily.    epoetin anca-epbx (RETACRIT) 3,000 unit/mL injection Inject 1 mL (3,000 Units total) into the skin every Mon, Wed, Fri.    isosorbide mononitrate (IMDUR) 30 MG 24 hr tablet Take 1 tablet (30 mg total) by mouth once daily.    polyethylene glycol (GLYCOLAX) 17 gram PwPk Take 17 g by mouth 2 (two) times daily.    senna-docusate 8.6-50 mg (PERICOLACE) 8.6-50 mg per tablet Take 2 tablets by mouth once daily.    sevelamer HCl (RENAGEL) 800 MG Tab Take 800 mg by mouth 3 (three) times daily with meals.    sodium chloride 1 gram tablet Take 2 g by mouth.     No current facility-administered medications for this visit.      Review of Systems   Constitutional: Negative for activity  "change, appetite change, chills, fatigue, fever and unexpected weight change.   HENT: Negative for ear pain, hearing loss, nosebleeds, sore throat and trouble swallowing.    Eyes: Negative for redness and visual disturbance.   Respiratory: Negative for cough, chest tightness, shortness of breath and wheezing.    Cardiovascular: Negative for chest pain and palpitations.   Gastrointestinal: Negative for abdominal distention, abdominal pain, blood in stool, constipation, diarrhea, nausea and vomiting.   Genitourinary: Negative for difficulty urinating, dysuria, frequency, hematuria and urgency.   Musculoskeletal: Negative for arthralgias, back pain, gait problem, joint swelling and myalgias.   Skin: Negative for rash.   Neurological: Negative for tremors, seizures, speech difficulty, weakness and headaches.   Hematological: Negative for adenopathy.   Psychiatric/Behavioral: Negative for confusion, decreased concentration and sleep disturbance. The patient is not nervous/anxious.          Objective:    BP (!) 191/129 (BP Location: Right arm, Patient Position: Sitting, BP Method: Medium (Automatic))   Pulse 71   Temp 97.7 °F (36.5 °C) (Oral)   Resp 18   Ht 5' 5" (1.651 m)   Wt 53.1 kg (117 lb 1 oz)   SpO2 99%   BMI 19.48 kg/m²     Physical Exam   Constitutional: She appears well-developed and well-nourished. No distress.   HENT:   Head: Normocephalic.   Eyes: Pupils are equal, round, and reactive to light.   Neck: No JVD present. No tracheal deviation present. No thyromegaly present.   Cardiovascular: Normal rate, regular rhythm and normal heart sounds.   No murmur heard.  Pulmonary/Chest: Effort normal and breath sounds normal. No stridor.   Abdominal: Soft. Bowel sounds are normal. She exhibits distension. There is no tenderness.   Mild ascites   Musculoskeletal: She exhibits no edema.   Lymphadenopathy:        Head (right side): No submental, no submandibular, no tonsillar, no preauricular, no posterior " auricular and no occipital adenopathy present.        Head (left side): No submental, no submandibular, no tonsillar, no preauricular, no posterior auricular and no occipital adenopathy present.     She has no cervical adenopathy.     She has no axillary adenopathy.   Neurological: She is alert. She has normal strength. She is not disoriented. No cranial nerve deficit or sensory deficit.   Skin: Skin is intact. No cyanosis.       Assessment:       1. Liver replaced by transplant    2. Uncontrolled hypertension    3. S/P craniotomy    4. Immunosuppressed        Plan:   I was pleased to see that she is well after her prolonged admission and has had no further seizures although her hypertension remains difficult to control.    Her BP today was high but she felt well.    - no change in tac dose  Clinic in 6 months  Labs every 2 weeks for now.

## 2019-07-08 NOTE — LETTER
July 11, 2019        Enrrique Moise  [address]  [facility] [address]  Phone: [phone]  Fax: [phone]             [facility]  [address]  [place] [address]  Phone: [phone]   Patient: Holly Patel   MR Number: [id number]   YOB: 1990   Date of Visit: 7/8/2019       Dear Dr. Enrrique Moise    Thank you for referring Holly Patel to me for evaluation. Attached you will find relevant portions of my assessment and plan of care.    If you have questions, please do not hesitate to call me. I look forward to following Holly Patel along with you.    Sincerely,    Lei Pinedo MD    Enclosure    If you would like to receive this communication electronically, please contact [email or web address] or [phone] to request [facility] access.    [facility] is a tool which provides read-only access to select patient information with whom you have a relationship. Its easy to use and provides real time access to review your patients record including encounter summaries, notes, results, and demographic information.    If you feel you have received this communication in error or would no longer like to receive these types of communications, please e-mail [email or web address]

## 2019-07-09 NOTE — TELEPHONE ENCOUNTER
"Compliance check:  Dialysis unit reports in the past three months pt has had "none" no shows, "none" AMAs, labs , no lab concerns noted, transportation no concerns noted and family support no concerns noted.    Reported by TANIYA Squires via fax back sheet.    "

## 2019-07-24 NOTE — ED PROVIDER NOTES
Encounter Date: 7/24/2019       History     Chief Complaint   Patient presents with    Hypertension     home health nurse told her to come in, L sided mouth feels numb, denies arm or leg numbenss or weakness.      This is a 27 y/o female with poorly-controlled HTN, ESRD on HD TTS via LUE AVF, infant hemangioendothelioma s/p OLT at age 2, seizure disorder, secondary hyperparathyroidism s/p parathyroidectomy 3/27/19, intraosseus hemangioma s/p L parietal craniotomy 4/22/19 who presents with elevated BP. She states that she has been having some L-sided headaches (although there is some chronicity to this problem secondary to her craniotomy earlier this year), blurred vision (also some chronic components - seen by OPHTO in past), and L-sided facial/lip numbness/tingling. She was evaluated by a Home Health RN and was told to come to ED because her BP was noted to be elevated at 190/128. She has had issues in the past with uncontrolled HTN - admitted in May 2019 for this reason with similar complaints. At that time, labetalol was added to her regimen, however, this was stopped in the interim for unclear reasons. She currently takes the following regimen - clonidine 0.3 mg TID, hydralazine 100 mg q8h, irbesartan 300 mg qAM, Imdur 30 mg qday, verapamil 240 mg qPM. She took her meds this morning. She had regularly scheduled HD @ [facility] yesterday and her BP was high there as well per her mother. Her mother said that she usually isn't concerned unless her SBP > 200. There are several mentions in the chart of possible noncompliance with meds. Denies F/C. Denies CP/SOB. No history of CVA or MI.        Review of patient's allergies indicates:   Allergen Reactions    Chloral hydrate Hallucinations     Other reaction(s): Hallucinations  Other reaction(s): Hives    Hydrocodone Other (See Comments)     Mental status changes    Tolerates oxycodone     Past Medical History:   Diagnosis Date    Anemia in ESRD (end-stage renal  disease) 10/12/2015    dialysis es, thursday, sat; access left arm    Chronic rejection of liver transplant 3/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 ()    Moderate protein-calorie malnutrition 2017    MRSA bacteremia 2017    Pneumonia     Prophylactic immunotherapy 2014    Renovascular hypertension 10/2/2015    Secondary hyperparathyroidism 2017    Seizures     Sialadenitis 3/21/2018    Thrombocytopenia 2016     Past Surgical History:   Procedure Laterality Date    BIOPSY, LIVER, TRANSJUGULAR APPROACH N/A 2018    Performed by [facility] at [facility] OR 2ND FLR    BIOPSY-LIVER N/A 2017    [facility]    BIOPSY-LIVER N/A 3/22/2016    [facility]     SECTION      x 2    CONIZATION-CERVICAL-LEEP N/A 6/15/2018    Performed by [facility]quin [facility] at [facility] OR    [id number]; upper extremity Left 2015    Performed by Idalia Diaz MD at [facility] OR 2ND FLR    CRANIOPLASTY  2019    Performed by Jose Luis Powell MD at [facility] OR 2ND [facility]LR    CRANIOTOMY-- left crani for clot evac with microscrope Left 2019    Performed by Jose Luis Powell MD at [facility] OR 2ND FLR    EMBOLIZATION, BLOOD VESSEL N/A 2018    Performed by Aren Ramos MD at [facility] CATH LAB    Exam Under Anesthesia N/A 2018    [facility]    Exam under anesthesia N/A 2018    Performed by [facility] at [facility] OR     Exam under anesthesia (ADD ON ) N/A 7/9/2018    Performed by [facility]IE [facility]varez MD at [facility] OR    Exam under anesthesia -cervical suturing  N/A 7/26/2018    Performed by Lei Sims III, MD at [facility] OR    EXCISION, NEOPLASM, SKULL with Cranioplasty Left 4/22/2019    Performed by [facility] at [facility] 2ND FLR    FISTULOGRAM Left 12/4/2015    Performed by Idalia Diaz MD at [facility] CATH LAB    LIVER BIOPSY      LIVER TRANSPLANT  09/1992    PARATHYROIDECTOMY, Minimally Invasive Bilateral Exploration Bilateral 3/27/2019    Performed by Ashley [facility]pa [facility] at [facility] OR 2ND FLR    SUTURE REPAIR,CERVIX  6/19/2018    Performed by [facility]am [facility]quin MD at [facility] OR    TUBAL LIGATION  2010     Family History   Problem Relation Age of Onset    Hypertension Mother     Hypertension Father     Hypertension Paternal Grandmother     Alzheimer's disease Paternal Grandmother     Heart disease Paternal Grandmother     Cancer Sister     Heart attack Maternal Uncle     No Known Problems Brother     No Known Problems Brother     No Known Problems Sister     No Known Problems Sister     Melanoma Neg Hx     Breast cancer Neg Hx     Colon cancer Neg Hx     Ovarian cancer Neg Hx     Kidney disease Neg Hx     Stroke Neg Hx      Social History     Tobacco Use    Smoking status: Never Smoker    Smokeless tobacco: Never Used   Substance Use Topics    Alcohol use: No    Drug use: Never     Review of Systems   Constitutional: Negative for chills and fever.   HENT: Negative for trouble swallowing.    Eyes: Positive for visual disturbance (blurry vision).   Respiratory: Negative for cough and shortness of breath.    Cardiovascular: Negative for chest pain and leg swelling.   Gastrointestinal: Negative for abdominal pain, nausea and vomiting.   Genitourinary:        Anuric 2/2 ESRD   Musculoskeletal: Negative for back pain.   Skin: Negative for rash and wound.   Allergic/Immunologic: Positive for  immunocompromised state (on Tacrolimus).   Neurological: Positive for seizures (last seizure several months ago). Negative for syncope.       Physical Exam     Initial Vitals [07/24/19 1514]   BP Pulse Resp Temp SpO2   (!) 192/125 66 18 98 °F (36.7 °C) 100 %      MAP       --         Physical Exam    Constitutional: She appears well-developed and well-nourished. No distress.   HENT:   Head: Normocephalic and atraumatic.   Mouth/Throat: Oropharynx is clear and moist.   Eyes: Conjunctivae and EOM are normal. Pupils are equal, round, and reactive to light.   Neck: Normal range of motion.   Well healed anterior transverse cervical incision   Cardiovascular: Normal rate and regular rhythm. Exam reveals no gallop.    No murmur heard.  Pulmonary/Chest: Breath sounds normal. No respiratory distress. She has no wheezes.   Abdominal: Soft. Bowel sounds are normal. She exhibits no distension. There is no tenderness.   Musculoskeletal: Normal range of motion. She exhibits no edema.   Neurological: She is alert and oriented to person, place, and time. She has normal strength. No cranial nerve deficit. GCS score is 15. GCS eye subscore is 4. GCS verbal subscore is 5. GCS motor subscore is 6.   Skin: Skin is warm and dry.         ED Course   Procedures  Labs Reviewed   COMPREHENSIVE METABOLIC PANEL - Abnormal; Notable for the following components:       Result Value    CO2 22 (*)     Glucose 112 (*)     BUN, Bld 22 (*)     Creatinine 6.7 (*)     Calcium 7.9 (*)     Alkaline Phosphatase 394 (*)     eGFR if  8.9 (*)     eGFR if non  7.7 (*)     All other components within normal limits   CBC W/ AUTO DIFFERENTIAL - Abnormal; Notable for the following components:    WBC 3.69 (*)     Mean Corpuscular Hemoglobin 26.3 (*)     Mean Corpuscular Hemoglobin Conc 31.4 (*)     RDW 18.3 (*)     Platelets 72 (*)     Immature Granulocytes 0.8 (*)     Lymph # 0.9 (*)     Eosinophil% 10.3 (*)     All other components  within normal limits          Imaging Results          CT Head Without Contrast (In process)                  Medical Decision Making:   Initial Assessment:   This is a 29 y/o female with poorly-controlled HTN, ESRD on HD TTS via LUE AVF, infant hemangioendothelioma s/p OLT at age 2, seizure disorder, secondary hyperparathyroidism s/p parathyroidectomy 3/27/19, intraosseus hemangioma s/p L parietal craniotomy 4/22/19 who presents with elevated BP. She states that she has been having some L-sided headaches (although there is some chronicity to this problem secondary to her craniotomy earlier this year), blurred vision (also some chronic components - seen by OPHTO in past), and L-sided facial/lip numbness/tingling. She was evaluated by a Home Health RN and was told to come to ED because her BP was noted to be elevated at 190/128.    On exam, /137, HR 66. No focal neuro deficits.    Will check basic labs (CMP, CBC) and give PM meds (clonidine, hydralazine, verapamil). Will closely monitor and reassess after meds.  Clinical Tests:   Lab Tests: Ordered and Reviewed  The following lab test(s) were unremarkable: CBC and CMP  Radiological Study: Ordered  ED Management:  7:01 PM  BP improved 197/133. Will order CTH given new facial symptoms and h/o craniotomy.                      Clinical Impression:       ICD-10-CM ICD-9-CM   1. Hypertension I10 401.9                                Jerardo Daniels MD  Resident  07/25/19 [id number]

## 2019-07-24 NOTE — TELEPHONE ENCOUNTER
Patient's home health nurse,Bo, states that the patient's blood pressure is 190/128. Patient is experiencing a HA, blurred vision, unsteady gait (but the unsteady gait is not new.) Patient is a dialysis patient and going to go to the ER [facility]. Patient verbalized understanding.     Reason for Disposition   Systolic BP >= 160 OR Diastolic >= 100, and any cardiac or neurologic symptoms (e.g., chest pain, difficulty breathing, unsteady gait, blurred vision)    Protocols used: HIGH BLOOD PRESSURE-A-OH

## 2019-07-25 NOTE — ED NOTES
Pt ao4, ambulatory and in NAD. Pt restated discharge instructions in her own words and demonstrated an understanding of follow up care. Pt, mother and family exited ED w/o further incident.

## 2019-07-25 NOTE — DISCHARGE INSTRUCTIONS
Continue on all your current medications as ordered.    Our goal in the emergency department is to always give you outstanding care and exceptional service. You may receive a survey by mail or e-mail in the next week regarding your experience in our ED. We would greatly appreciate your completing and returning the survey. Your feedback provides us with a way to recognize our staff who give very good care and it helps us learn how to improve when your experience was below our aspiration of excellence.

## 2019-07-25 NOTE — EKG INTERPRETATIONS - EMERGENCY DEPT.
Independently interpreted by MD:  Rate 65, NSR, no stemi, no ectopy, biatrial enlargement, LVH, poor qrs progression in V3-4

## 2019-07-29 NOTE — TELEPHONE ENCOUNTER
Returned call to pt. No answer. LVM that tacrolimus prescription was sent to [facility] but I will resend to [facility] for her today. Requested return call with any questions or concerns.     Critical lab value received from lab -  Ca 6.6.  Pt on HD Tu-Th-Sat. Contacted [facility] and spoke with charge RN. Stated they were aware that Ca was low and that Ca will be corrected tomorrow with HD.

## 2019-07-29 NOTE — TELEPHONE ENCOUNTER
----- Message from Christine Coello sent at 7/29/2019 11:30 AM CDT -----  Rx Refill/Request     Is this a Refill or New Rx:  new  Rx Name and Strength:  tacrolimus (PROGRAF) 5 MG Cap  Preferred Pharmacy with phone number: [facility] [phone]  Communication Preference: [phone]  Additional Information: pt states pharmacy told her rx was cancelled since May

## 2019-07-30 NOTE — TELEPHONE ENCOUNTER
----- Message from Lei Pinedo MD sent at 7/29/2019  4:41 PM CDT -----  Results reviewed  Repeat Wednesday

## 2019-07-30 NOTE — TELEPHONE ENCOUNTER
Spoke with pt. She agreed to repeat labs tomorrow.    While on phone, states her SBP was 200 at HD today. States they gave her a clonipin and it corrected. Pt requesting assistance in managing her BPs. She states the nurse called Dr. Bass, pt's Nephrologist and was waiting on return call for further BP instructions. Will also put in for pt to see cards urgently yo assist with BP management.

## 2019-07-31 NOTE — TELEPHONE ENCOUNTER
Returned call. Advised mother that she needs to contact Nephrology or PCP for BP medication refills. She agreed with plan.

## 2019-07-31 NOTE — TELEPHONE ENCOUNTER
----- Message from Oriana Roa sent at 7/31/2019  8:29 AM CDT -----  Contact: Myrna pt's mother   Needs Advice    Reason for call: Notify coordinator that [facility] haven't received a script for pt's BP medication        Communication Preference: [phone]    Additional Information: N/A

## 2019-08-01 NOTE — TELEPHONE ENCOUNTER
Spoke with pt. Advised of stable labs and that no medication changes needed. Pt will repeat labs 8/12/19.

## 2019-08-05 NOTE — PROGRESS NOTES
CC: Low platelets, anemia, follow up     HPI Holly Patel is a 27 y.o. female with secondary hypertension, h/o liver transplant in 1992, ESRD on HD via L RC AVF placed July 2015. She is here for follow up for low platelets. Denies any overt bleeding from GI/ tracts or nose. No hematuria. No vaginal bleeding or spotting. No recent medication changes or recent travel. She reports losing 30 lbs since early 2018, unintentionally. Her appetite is not 'good'. No fever or night sweats.  Platelet count was normal until Nov 2014. It has declined since then. It ranged between 79k -155 k in 2015, 64k- 122k in 2016, 50k -113k in 2017.        Interval history: She is here for a follow up visit.    Review of Systems   Constitutional: Negative for chills, fever and weight loss.   HENT: Negative for ear discharge, ear pain, hearing loss, nosebleeds and sinus pain.    Eyes: Negative for blurred vision, double vision, photophobia and discharge.   Respiratory: Negative for cough, hemoptysis, sputum production, shortness of breath and wheezing.    Cardiovascular: Negative for chest pain, palpitations, orthopnea, claudication and PND.   Gastrointestinal: Negative for blood in stool, heartburn and vomiting.   Genitourinary: Negative for dysuria, flank pain, frequency, hematuria and urgency.   Musculoskeletal: Negative for joint pain, myalgias and neck pain.   Skin: Negative for rash.   Neurological: Positive for headaches. Negative for dizziness, tingling, tremors, sensory change, speech change and focal weakness.   Endo/Heme/Allergies: Negative for environmental allergies. Does not bruise/bleed easily.   Psychiatric/Behavioral: Negative for depression, hallucinations and substance abuse. The patient is not nervous/anxious.           Vitals:    08/05/19 1452   BP: (!) 235/142   Pulse: 67   Resp: 18   Temp: 98 °F (36.7 °C)     Physical Exam   Constitutional: She is oriented to person, place, and time. She appears  well-developed.   HENT:   Head: Normocephalic.   Mouth/Throat: No oropharyngeal exudate.   Eyes: Pupils are equal, round, and reactive to light. No scleral icterus.   Neck: Normal range of motion.   Cardiovascular: Normal rate and regular rhythm.   Murmur heard.  She has loud S2   Pulmonary/Chest: Effort normal. No respiratory distress. She has no wheezes. She has no rales.   Abdominal: Soft. She exhibits no distension. There is no tenderness. There is no rebound.   Musculoskeletal: She exhibits no edema.   AV fistula in left UE   Neurological: She is alert and oriented to person, place, and time.   Skin: Skin is warm. No erythema.   Psychiatric: She has a normal mood and affect.       Component      Latest Ref Rng & Units 8/5/2019   WBC      3.90 - 12.70 K/uL 4.27   RBC      4.00 - 5.40 M/uL 4.82   Hemoglobin      12.0 - 16.0 g/dL 12.5   Hematocrit      37.0 - 48.5 % 39.8   MCV      82 - 98 fL 83   MCH      27.0 - 31.0 pg 25.9 (L)   MCHC      32.0 - 36.0 g/dL 31.4 (L)   RDW      11.5 - 14.5 % 17.2 (H)   Platelets      150 - 350 K/uL 63 (L)   MPV      9.2 - 12.9 fL SEE COMMENT   Immature Granulocytes      0.0 - 0.5 % 2.3 (H)   Gran # (ANC)      1.8 - 7.7 K/uL 2.4   Immature Grans (Abs)      0.00 - 0.04 K/uL 0.10 (H)   Lymph #      1.0 - 4.8 K/uL 1.1   Mono #      0.3 - 1.0 K/uL 0.4   Eos #      0.0 - 0.5 K/uL 0.3   Baso #      0.00 - 0.20 K/uL 0.03   nRBC      0 /100 WBC 0   Gran%      38.0 - 73.0 % 56.7   Lymph%      18.0 - 48.0 % 24.8   Mono%      4.0 - 15.0 % 8.7   Eosinophil%      0.0 - 8.0 % 6.8   Basophil%      0.0 - 1.9 % 0.7   Differential Method       Automated     5/16/18 serum ABDIEL: No monoclonal peaks identified  5/16/18 SPEP: Normal total protein. Increased gamma globulin, polyclonal. No paraprotein bands identified.       Assessment:     1. Thrombocytopenia  2. Anemia  4. ESRD on HD  5. S/p liver transplant  6. Long term immunosuppression  7. Hypertensive urgency        Plan:     1. Platelet count was  normal until Nov 2014. It has declined since then. It ranged between 79k -155 k in 2015, 64k- 122k in 2016, 50k -113k in 2017. Most recent platelet count is 89k on  5/3/18. No bleeding diathesis. HBV, HCV serologies negative in 2016.   HIV negative in 2015. FELIX negative in 2015. No recent medication changes. She has been on Prograf since 1992.    B12, LDH, folate, TFT, haptoglobin all normal in May 2018. EBV DNA by PCR not detected. CMV Igg positive. Serum quantitative immunoglobulins showed elevated IgG. SPEP/TAYLOR normal. sFLC ratio slightly elevated.  Unlikely to be MAHA/TTP/HUS, although Tacrolimus is known to be associated with TTP. CT done in January 2018 did not reveal any mass/adenopathy. However, it was a non-contrast study.  She has chronic splenomegaly, which can also be contributing to thrombocytopenia due to sequestration. Fibrinogen was previously normal. PTT, INR not allowed to be drawn by her insurance. Her latest platelet count is 63k on 8/5/19.     Since her cytopenias have improved, no urgent indication to do a bone marrow biopsy. If, however, she needs to be cleared for a renal transplant, and underlying marrow dyscrasia has to be rule dout, bone marrow biopsy has to be done.  She is aware of this and will call us to schedule the procedure.                2. Possibly multi-factorial--medication ( Prograf), ESRD, nutritional. SPEP/taylor did not show monoclonal protein. TFT, B12, folate, LDH were normal. Reticulocyte count was high. Haptoglobin normal. T bili normal. Unlikely to be MAHA. She has very high BP and is not a candidate for Procrit/Aranesp. No anemia today    7. She has headache. She was advised to go to ER today          Distress Screening Results: Psychosocial Distress screening score of Distress Score: 0 noted and reviewed. No intervention indicated.

## 2019-08-09 NOTE — TELEPHONE ENCOUNTER
Call returned. Holly is inquiring if she will get on the transplant list. It was so good to hear her back to herself on the telephone!!!! I advised that I will be re presenting her case to transplant committee next week and will call to after that meeting is over.

## 2019-08-09 NOTE — TELEPHONE ENCOUNTER
----- Message from Daksha Randolph sent at 8/9/2019  9:37 AM CDT -----  Contact: PT  PT would like to speak with coordinator.   PT didn't mention the reason.       Callback: [phone]

## 2019-08-12 NOTE — PROGRESS NOTES
Subjective:   Patient ID:  Holly Patel is a 28 y.o. female who presents for follow-up of Hypertensive urgency and Liver replaced by transplant  Holly Patel is a 28 y.o. female is a new patient who presents for evaluation of Hypertension; Shortness of Breath; and Edema  Holly Patel is a 28 y.o. female is a new patient who presents for evaluation of Hypertension; Kidney Transplant Evaluation; and Shortness of Breath  Preoperative clearance for kidney transplant, ESRD, HTN, liver transplant in 2012      HPI:   HTN still uncontrolled and she wants to talk about that.   Does not exercise.   Significant MENDOSA.   No chest pain, Orthopnea, PND of heart failure symptoms.   Occasional palpitations.   Patient is feeling SOB along with palpitations that especially at the time of dialysis.   EKG: SR with LVH.     HPI:   Patient says that clonidine does not work. Minoxidil that was stopped because she was having increased hair but she says that that medication was working for her.   She is doing ok otherwise.   Does not exercise.   Significant MENDOSA.   No chest pain, Orthopnea, PND of heart failure symptoms.   Occasional palpitations.     Patient Active Problem List   Diagnosis    Liver replaced by transplant    Prophylactic immunotherapy    Acute nonintractable headache    Hypertensive urgency    ESRD on hemodialysis    Renovascular hypertension    Acute blood loss anemia    Anemia in ESRD (end-stage renal disease)    Non compliance w medication regimen    ESRD on dialysis    Immunosuppressed    Secondary hyperparathyroidism    Iron deficiency anemia secondary to inadequate dietary iron intake    Moderate protein-calorie malnutrition    Uncontrolled hypertension    Seizures    Hypervolemia    Weight loss, unintentional    Thrombocytopenia    Leukopenia    Chronic fatigue    Anemia of unknown etiology    Depression    Symptomatic anemia    Vaginal atrophy with desquamative  "inflammatory vaginitis    Hyponatremia    Hypertensive retinopathy of both eyes, grade 1    Elevated PTHrP level    Chronic kidney disease-mineral and bone disorder    Abnormal LFTs    Non-intractable vomiting with nausea    Seizure-like activity    Gastroenteritis    Hyperparathyroidism    Hyperparathyroidism due to end stage renal disease on dialysis    Hypocalcemia    Hypophosphatemia    Brain tumor    Nontraumatic subcortical hemorrhage of left cerebral hemisphere    S/P craniotomy    Discharge planning issues    Right homonymous hemianopsia     BP (!) 215/141 (BP Location: Right arm, Patient Position: Sitting, BP Method: Pediatric (Automatic))   Pulse 71   Ht 5' 5" (1.651 m)   Wt 49.9 kg (110 lb 0.2 oz)   SpO2 100%   BMI 18.31 kg/m²   Body mass index is 18.31 kg/m².  CrCl cannot be calculated (Patient's most recent lab result is older than the maximum 7 days allowed.).    Lab Results   Component Value Date     07/31/2019    K 4.1 07/31/2019     07/31/2019    CO2 25 07/31/2019    BUN 29 (H) 07/31/2019    CREATININE 6.5 (H) 07/31/2019     07/31/2019    HGBA1C 4.7 05/24/2019    MG 2.0 05/24/2019    AST 35 07/31/2019    ALT 22 07/31/2019    ALBUMIN 3.5 07/31/2019    PROT 7.6 07/31/2019    BILITOT 1.1 (H) 07/31/2019    WBC 4.30 08/12/2019    HGB 13.2 08/12/2019    HCT 41.9 08/12/2019    HCT 23 (L) 05/29/2019    MCV 84 08/12/2019    PLT 58 (L) 08/12/2019    INR 1.2 05/23/2019    TSH 2.623 11/15/2018    CHOL 215 (H) 09/26/2018    HDL 67 09/26/2018    LDLCALC 137.6 09/26/2018    TRIG 52 09/26/2018       Current Outpatient Medications   Medication Sig    bacitracin 500 unit/gram ointment Apply topically as needed.    bisacodyl (DULCOLAX) 10 mg Supp Place 1 suppository (10 mg total) rectally once daily.    calcitRIOL (ROCALTROL) 0.5 MCG Cap Take 2 capsules (1 mcg total) by mouth 2 (two) times daily.    cloNIDine (CATAPRES) 0.3 MG tablet Take 1 tablet (0.3 mg total) by mouth 2 " (two) times daily.    epoetin anca-epbx (RETACRIT) 3,000 unit/mL injection Inject 1 mL (3,000 Units total) into the skin every Mon, Wed, Fri.    hydrALAZINE (APRESOLINE) 100 MG tablet Take 1 tablet (100 mg total) by mouth every 8 (eight) hours.    irbesartan (AVAPRO) 300 MG tablet Take 1 tablet (300 mg total) by mouth every morning.    isosorbide mononitrate (IMDUR) 30 MG 24 hr tablet Take 1 tablet (30 mg total) by mouth once daily.    lacosamide (VIMPAT) 50 mg Tab Take 1 tablet (50 mg total) by mouth every 12 (twelve) hours.    levETIRAcetam (KEPPRA) 500 MG Tab Take 1 tablet (500 mg total) by mouth 2 (two) times daily.    ondansetron (ZOFRAN-ODT) 4 MG TbDL Take 1 tablet (4 mg total) by mouth every 6 (six) hours as needed.    pantoprazole (PROTONIX) 40 MG tablet Take 1 tablet (40 mg total) by mouth once daily.    polyethylene glycol (GLYCOLAX) 17 gram PwPk Take 17 g by mouth 2 (two) times daily.    senna-docusate 8.6-50 mg (PERICOLACE) 8.6-50 mg per tablet Take 2 tablets by mouth once daily.    sevelamer HCl (RENAGEL) 800 MG Tab Take 800 mg by mouth 3 (three) times daily with meals.    sodium chloride 1 gram tablet Take 2 g by mouth 2 (two) times daily.     tacrolimus (PROGRAF) 5 MG Cap Take 1 capsule (5 mg total) by mouth every 12 (twelve) hours.    verapamil (VERELAN) 240 MG C24P Take 1 capsule (240 mg total) by mouth every evening.    minoxidil (LONITEN) 10 MG Tab Take 1 tablet (10 mg total) by mouth once daily.     No current facility-administered medications for this visit.        Review of Systems   Constitution: Negative for chills, decreased appetite, malaise/fatigue, night sweats, weight gain and weight loss.   Eyes: Negative for blurred vision, double vision, visual disturbance and visual halos.   Cardiovascular: Negative for chest pain, claudication, cyanosis, dyspnea on exertion, irregular heartbeat, leg swelling, near-syncope, orthopnea, palpitations, paroxysmal nocturnal dyspnea and  syncope.   Respiratory: Negative for cough, hemoptysis, snoring, sputum production and wheezing.    Endocrine: Negative for cold intolerance, heat intolerance, polydipsia and polyphagia.   Hematologic/Lymphatic: Negative for adenopathy and bleeding problem. Does not bruise/bleed easily.   Skin: Negative for flushing, itching, poor wound healing and rash.   Musculoskeletal: Negative for arthritis, back pain, falls, gout, joint pain, joint swelling, muscle cramps, muscle weakness, myalgias, neck pain and stiffness.   Gastrointestinal: Negative for bloating, abdominal pain, anorexia, diarrhea, dysphagia, excessive appetite, flatus, hematemesis, jaundice, melena and nausea.   Genitourinary: Negative for hesitancy and incomplete emptying.   Neurological: Negative for aphonia, brief paralysis, difficulty with concentration, disturbances in coordination, excessive daytime sleepiness, dizziness, focal weakness, light-headedness, loss of balance and weakness.   Psychiatric/Behavioral: Negative for altered mental status, depression, hallucinations, hypervigilance, memory loss, substance abuse and suicidal ideas. The patient does not have insomnia and is not nervous/anxious.        Objective:   Physical Exam   Constitutional: She is oriented to person, place, and time. She appears well-developed and well-nourished. No distress.   HENT:   Head: Normocephalic and atraumatic.   Nose: Nose normal.   Mouth/Throat: Oropharynx is clear and moist. No oropharyngeal exudate.   Eyes: Pupils are equal, round, and reactive to light. Conjunctivae and EOM are normal. Right eye exhibits no discharge. Left eye exhibits no discharge. No scleral icterus.   Neck: Normal range of motion. Neck supple. No JVD present. No tracheal deviation present. No thyromegaly present.   Cardiovascular: Normal rate, regular rhythm, normal heart sounds and intact distal pulses. Exam reveals no gallop and no friction rub.   No murmur heard.  Pulmonary/Chest: Effort  normal and breath sounds normal. No stridor. No respiratory distress. She has no wheezes. She has no rales. She exhibits no tenderness.   Abdominal: Soft. Bowel sounds are normal. She exhibits no distension and no mass. There is no tenderness. There is no rebound and no guarding.   Musculoskeletal: Normal range of motion. She exhibits no edema or tenderness.   Lymphadenopathy:     She has no cervical adenopathy.   Neurological: She is alert and oriented to person, place, and time. She has normal reflexes. No cranial nerve deficit. She exhibits normal muscle tone. Coordination normal.   Skin: Skin is warm. No rash noted. She is not diaphoretic. No erythema. No pallor.   Psychiatric: She has a normal mood and affect. Her behavior is normal. Judgment and thought content normal.       Assessment:     1. Uncontrolled hypertension    2. ESRD (end stage renal disease) on dialysis    3. Anemia in ESRD (end-stage renal disease)    4. ESRD on hemodialysis    5. Renovascular hypertension    6. Liver replaced by transplant    7. Hypertensive urgency        Plan:   Holly was seen today for hypertensive urgency and liver replaced by transplant.    Diagnoses and all orders for this visit:    Uncontrolled hypertension    ESRD (end stage renal disease) on dialysis    Anemia in ESRD (end-stage renal disease)    ESRD on hemodialysis    Renovascular hypertension    Liver replaced by transplant    Hypertensive urgency    Other orders  -     minoxidil (LONITEN) 10 MG Tab; Take 1 tablet (10 mg total) by mouth once daily.      Will talk to Dr. Nielson if she can be a candidate for renal denervation study.

## 2019-08-12 NOTE — LETTER
August 12, 2019      Lei Pinedo MD  [address]  [facility] [address]           [facility]  [address]  [facility] [address]  Phone: [phone]          Patient: Holly Patel   MR Number: [id number]   YOB: 1990   Date of Visit: 8/12/2019       Dear Dr. Lei Pinedo:    Thank you for referring Holly Patel to me for evaluation. Attached you will find relevant portions of my assessment and plan of care.    If you have questions, please do not hesitate to call me. I look forward to following Holly Patel along with you.    Sincerely,    Sandra Magallon MD    Enclosure  CC:  No Recipients    If you would like to receive this communication electronically, please contact [email or web address] or [phone] to request more information on [employer] access.    For providers and/or their staff who would like to refer a patient to [facility], please contact us through our one-stop-shop provider referral line, [facility], at [phone].    If you feel you have received this communication in error or would no longer like to receive these types of communications, please e-mail [email or web address]

## 2019-08-12 NOTE — TELEPHONE ENCOUNTER
----- Message from Laverne Clark sent at 8/12/2019  9:06 AM CDT -----  Contact: SYEDA BANKS   Name of Who is Calling: SYEDA BANKS       What is the request in detail: Patient is requesting a call back. She would like to schedule an appointment for a check up.       Can the clinic reply by [facility]: no      What Number [facility] Back [facility]:  [phone]

## 2019-08-16 NOTE — LETTER
August 16, 2019    Holly Patel  [address] [place] [id number]          Dear Holly Patel:  MRN: [id number]    This is a follow up to your recent labs, your lab results were stable.  There are no medicine changes.  Please have your labs drawn again on 9/9/19.      If you cannot have your labs drawn on the scheduled date, it is your responsibility to call the transplant department to reschedule.  To reschedule or make an appointment, please as to speak to or leave a message for my assistant, Tara Pollack or [place], at [phone].  When leaving a message for Tara Pollack Angela or myself, we ask that you leave a brief message regarding your request.    Sincerely,    Violeta Francis, RN, BSN, [facility]  Liver Transplant Coordinator  [facility]  [address] [place] [id number]  [phone]

## 2019-08-16 NOTE — TELEPHONE ENCOUNTER
Letter sent, labs stable and no medication changes are needed. Repeat labs due 9/9/19 per protocol.

## 2019-08-19 NOTE — DISCHARGE INSTRUCTIONS
You may take Tylenol over the counter.  Take only 500mg every 6 hours.  No more than 2000mg in 24 hours.  Stay well hydrated.    Our goal in the emergency department is to always give you outstanding care and exceptional service. You may receive a survey by mail or e-mail in the next week regarding your experience in our ED. We would greatly appreciate your completing and returning the survey. Your feedback provides us with a way to recognize our staff who give very good care and it helps us learn how to improve when your experience was below our aspiration of excellence.

## 2019-08-19 NOTE — ED PROVIDER NOTES
Encounter Date: 8/19/2019       History     Chief Complaint   Patient presents with    Seizures     back to back seizures since last night     28-year-old female with multiple medical problems including and known seizure disorder, end-stage renal disease, and history of liver transplant presents with several seizures witnessed by mother since last night.  Patient has not had a seizure for several months.  She reports compliance with her Keppra.  Patient has associated myalgias and intermittent trembling.  She denies fever, but does admit to nausea with nonbloody vomit and cough.  She denies diarrhea, shortness of breath, or chest pain. Some mild abdominal pain. Patient does not make urine.  She is dialyzed on Tuesdays, Thursdays, and Saturday.  She completed dialysis the previous Saturday.  Patient denies sick contacts including the children in the house.  Mother reports patient has similar symptoms on her last admission several months ago and was admitted for several days.  A ten point review of systems was completed and is negative except as documented above.  Patient denies any other acute medical complaint.  The patients available PMH, PSH, Social History, medications, allergies, and triage vital signs were reviewed just prior to their medical evaluation.        Review of patient's allergies indicates:   Allergen Reactions    Chloral hydrate Hallucinations     Other reaction(s): Hallucinations  Other reaction(s): Hives    Hydrocodone Other (See Comments)     Mental status changes    Tolerates oxycodone     Past Medical History:   Diagnosis Date    Anemia in ESRD (end-stage renal disease) 10/12/2015    dialysis tues, thursday, sat; access left arm    Chronic rejection of liver transplant 3/22/2016    Depression     Encounter for blood transfusion     ESRD on hemodialysis 9/30/2015    History of recent hospitalization 05/2018    pneumonia    History of splenomegaly 4/12/2016    Immunosuppressed 8/5/2017     Iron deficiency anemia secondary to inadequate dietary iron intake 2017    [facility]   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 ()    Moderate protein-calorie malnutrition 2017    MRSA bacteremia 2017    Pneumonia     Prophylactic immunotherapy 2014    Renovascular hypertension 10/2/2015    Secondary hyperparathyroidism 2017    Seizures     Sialadenitis 3/21/2018    Thrombocytopenia 2016     Past Surgical History:   Procedure Laterality Date    BIOPSY, LIVER, TRANSJUGULAR APPROACH N/A 2018    Performed by [facility] OR 2ND FLR    BIOPSY-LIVER N/A 2017    [facility]    BIOPSY-LIVER N/A 3/22/2016    Performed by [facility] OR [address] [place] [address]     SECTION      x 2    CONIZATION-CERVICAL-LEEP N/A 6/15/2018    Performed by [facility]quin [facility] at [facility] OR    [id number]; upper extremity Left 2015    Performed by Idalia Diaz MD at [facility] OR 2ND FLR    CRANIOPLASTY  2019    Performed by Jose Luis Powell MD at [facility] OR 2ND FLR    CRANIOTOMY-- left crani for clot evac with microscrope Left 2019    Performed by Jose [facility] Powell [facility] at [facility] OR 2ND FLR    EMBOLIZATION, BLOOD VESSEL N/A 2018    Performed by [facility]ren Ramos MD at [facility] CATH LAB    Exam Under Anesthesia N/A 2018    Performed by [facility] at [facility] OR 2ND FLR    Exam under anesthesia N/A 2018    Performed by [facility] at [facility] OR    Exam under anesthesia (ADD ON ) N/A 2018    Performed by NICKIE Alvarez MD at [facility] OR    Exam under anesthesia -cervical suturing  N/A 2018    Performed by Lei Sims III, MD at [facility] OR    EXCISION, NEOPLASM, SKULL with Cranioplasty Left 2019    [facility]    FISTULOGRAM  Left 12/4/2015    Performed by [facility] at [facility] CATH LAB    LIVER BIOPSY      LIVER TRANSPLANT  09/1992    PARATHYROIDECTOMY, Minimally Invasive Bilateral Exploration Bilateral 3/27/2019    Performed by Ashley Guallpa MD at [facility] OR 2ND FLR    SUTURE REPAIR,CERVIX  6/19/2018    [facility]    TUBAL LIGATION  2010     Family History   Problem Relation Age of Onset    Hypertension Mother     Hypertension Father     Hypertension Paternal Grandmother     Alzheimer's disease Paternal Grandmother     Heart disease Paternal Grandmother     Cancer Sister     Heart attack Maternal Uncle     No Known Problems Brother     No Known Problems Brother     No Known Problems Sister     No Known Problems Sister     Melanoma Neg Hx     Breast cancer Neg Hx     Colon cancer Neg Hx     Ovarian cancer Neg Hx     Kidney disease Neg Hx     Stroke Neg Hx      Social History     Tobacco Use    Smoking status: Never Smoker    Smokeless tobacco: Never Used   Substance Use Topics    Alcohol use: No    Drug use: Never     Review of Systems   Constitutional: Negative for fever.   HENT: Negative for congestion and sore throat.    Eyes: Negative for visual disturbance.   Respiratory: Positive for cough. Negative for shortness of breath.    Cardiovascular: Negative for chest pain.   Gastrointestinal: Positive for abdominal pain, nausea and vomiting. Negative for diarrhea.   Genitourinary: Negative for dysuria.   Musculoskeletal: Positive for myalgias. Negative for neck pain.   Skin: Negative for rash and wound.   Allergic/Immunologic: Positive for immunocompromised state.   Neurological: Positive for seizures. Negative for syncope.   Psychiatric/Behavioral: Negative for confusion.   All other systems reviewed and are negative.      Physical Exam     Initial Vitals [08/19/19 0850]   BP Pulse Resp Temp SpO2   (!) 178/124 (!) 125 20 98.4 °F (36.9 °C) 100 %      MAP       --          Physical Exam    Nursing note and vitals reviewed.  Constitutional: She appears well-developed and well-nourished. She is not diaphoretic. No distress.   HENT:   Head: Normocephalic and atraumatic.   Nose: Nose normal.   Eyes: Conjunctivae are normal. Right eye exhibits no discharge. Left eye exhibits no discharge.   Neck: Normal range of motion. Neck supple.   Cardiovascular: Regular rhythm and normal heart sounds. Exam reveals no gallop and no friction rub.    No murmur heard.  tachycardia   Pulmonary/Chest: Breath sounds normal. No respiratory distress. She has no wheezes. She has no rhonchi. She has no rales.   Dry cough   Abdominal: Soft. She exhibits no distension. There is tenderness. There is no rebound and no guarding.   Mild generalized ttp   Musculoskeletal: Normal range of motion. She exhibits no edema or tenderness.   Fistula to left lower arm with palpable thrill   Neurological: She is alert and oriented to person, place, and time. She has normal strength. GCS score is 15. GCS eye subscore is 4. GCS verbal subscore is 5. GCS motor subscore is 6.   Skin: Skin is warm and dry. No rash noted. No erythema.   Psychiatric: She has a normal mood and affect. Her behavior is normal. Judgment and thought content normal.         ED Course   Procedures  Labs Reviewed   CBC W/ AUTO DIFFERENTIAL - Abnormal; Notable for the following components:       Result Value    RBC 6.13 (*)     Mean Corpuscular Volume 79 (*)     Mean Corpuscular Hemoglobin 25.8 (*)     RDW 19.0 (*)     Platelets 91 (*)     Immature Granulocytes 1.5 (*)     Immature Grans (Abs) 0.09 (*)     Lymph # 0.8 (*)     Mono # 0.2 (*)     Gran% 77.8 (*)     Lymph% 13.0 (*)     Mono% 3.2 (*)     All other components within normal limits   COMPREHENSIVE METABOLIC PANEL - Abnormal; Notable for the following components:    Potassium 5.8 (*)     CO2 21 (*)     BUN, Bld 40 (*)     Creatinine 8.3 (*)     Calcium 8.5 (*)     Total Protein 9.8 (*)      Alkaline Phosphatase 433 (*)     Anion Gap 21 (*)     eGFR if  6.9 (*)     eGFR if non  5.9 (*)     All other components within normal limits   PROCALCITONIN - Abnormal; Notable for the following components:    Procalcitonin 0.53 (*)     All other components within normal limits   CULTURE, BLOOD   CULTURE, BLOOD   INFLUENZA A & B BY MOLECULAR   MAGNESIUM   PHOSPHORUS   LACTIC ACID, PLASMA   LIPASE   TACROLIMUS LEVEL   HCG, QUANTITATIVE, PREGNANCY   LEVETIRACETAM  (KEPPRA) LEVEL     EKG Readings: (Independently Interpreted)   Initial Reading: No STEMI. Rhythm: Sinus Tachycardia. Heart Rate: 122. Ectopy: No Ectopy. Conduction: Normal.   Biatrial enlargement, LVH, flat T waves in V5-6       Imaging Results          X-Ray Chest AP Portable (Final result)  Result time 08/19/19 09:51:15    Final result by Fredy Frazier MD (08/19/19 09:51:15)                 Impression:      See above      Electronically signed by: Fredy Frazier MD  Date:    08/19/2019  Time:    09:51             Narrative:    EXAMINATION:  XR CHEST AP PORTABLE    CLINICAL HISTORY:  Cough    TECHNIQUE:  Single frontal view of the chest was performed.    COMPARISON:  No 04/26/2019 ne    FINDINGS:  Heart size upper limit of normal or mildly enlarged.  The lungs are clear.  No pleural effusion                              X-Rays:   Independently Interpreted Readings:   Other Readings:  Reviewed CXR image    Medical Decision Making:   History:   I obtained history from: someone other than patient.  Old Medical Records: I decided to obtain old medical records.  Old Records Summarized: records from previous admission(s).  Independently Interpreted Test(s):   I have ordered and independently interpreted X-rays - see prior notes.  I have ordered and independently interpreted EKG Reading(s) - see prior notes  Clinical Tests:   Lab Tests: Ordered and Reviewed  Radiological Study: Ordered and Reviewed  Medical Tests: Ordered and  Reviewed  ED Management:  28-year-old female with multiple medical problems presents with diffuse myalgias and tachycardia.  Vitals without fever.  Physical exam as above.  Extensive laboratory evaluation unremarkable. Chest x-ray without evidence of pneumonia.  EKG without acute ischemia.  Doubt ACS, PE, dissection, PNX, PNA, or tamponade.  Viral etiology likely.  However, despite small fluid bolus patient remained tachycardic.  Given she is immunosuppressed and with multiple medical problems I offered her admission for observation pending her cultures.  Patient declined admission and prefers to go home.  Counseled at bedside on the risks and benefits.  Witnessed by nurse (Migdalia).  Will discharge the patient home her request.  She will use Tylenol as educated as needed for myalgias.  She will continue with her regular dialysis schedule.  She will call her primary physician tomorrow to arrange close follow-up.  Patient will return to ED for worsening symptoms, inability to eat/drink, fever greater than 100.4, or any other concerns.  Did bedside teaching with return precautions.  All questions answered.  The patient acknowledges understanding.  Gave verbal discharge instructions.                      Clinical Impression:   Final diagnoses:  [R05] Cough  [R00.0] Tachycardia  [M79.10] Myalgia  [B34.9] Acute viral syndrome (Primary)      Disposition:   Disposition: Discharged  Condition: Stable    Level of Complexity:  High, level 5.                    Justus Villalta MD  08/19/19 1945

## 2019-08-19 NOTE — ED NOTES
"Pt offered admission per Dr. Villalta. Pt states, "I'm really tired of hospitals right now. I really just want to go home and take my medicines and deal with this there". Pt assured Dr. Villalta of compliance with home medications. Dr. Villalta advised pt on when to come back. Pt verbalized understanding.  "

## 2019-08-19 NOTE — ED TRIAGE NOTES
Pt arrived POV from home with mother. Family reports pt has been having more frequent seizures since yesterday with generalized body aches and emesis. Last dialysis on Saturday. Tue, Thurs, Sat is her schedule. Hx of seizures and is compliant with medication.

## 2019-08-22 NOTE — ED NOTES
Adult Physical Assessment  LOC: Holly Patel, 28 y.o. female verified via two identifiers.  The patient is awake, alert, oriented and speaking appropriately at this time.  APPEARANCE: Patient resting comfortably and appears to be in no acute distress at this time. Patient is clean and well groomed, patient's clothing is properly fastened.  SKIN:The skin is warm and dry, color consistent with ethnicity, patient has normal skin turgor and moist mucus membranes, skin intact, no breakdown or brusing noted.  MUSCULOSKELETAL: Patient moving all extremities well, no obvious swelling or deformities noted.  RESPIRATORY: Airway is open and patent, respirations are spontaneous, patient has a normal effort and rate, no accessory muscle use noted.  CARDIAC: Patient has a normal rate and rhythm, no periphreal edema noted in any extremity, capillary refill < 3 seconds in all extremities  ABDOMEN: Soft and non tender to palpation, no abdominal distention noted. Bowel sounds present in all four quadrants.  NEUROLOGIC: Eyes open spontaneously, behavior appropriate to situation, follows commands, facial expression symmetrical, bilateral hand grasp equal and even, purposeful motor response noted, normal sensation in all extremities when touched with a finger.

## 2019-08-22 NOTE — ED TRIAGE NOTES
Patient arrived to ED from dialysis clinic, completed about 1 hour of her treatment, w CC of stomach ache, body aches, elevated HR and diarrhea.  at the bedside.PMH significant to ESRD.

## 2019-08-22 NOTE — ED PROVIDER NOTES
"Encounter Date: 8/22/2019       History     Chief Complaint   Patient presents with    Nausea, Vomiting, Tachycardia     left dialysis early today after about an hour, was told her pulse was high, reports nausea and vomiting and a "stomach virus" seen 2 days ago in ER for Viral syndrome     Ms. Patel is a 28 F hx of ESRD on HD T/Th/Sat, seizures, liver tranplant @ 2 years old here with generalized myalgias and abdominal pain.  Pt states symptoms started on Monday which also included vomiting.  Pt was evaluated in ED 8/19- labs unremarkable.  Offered obs but patient requested d/c.  Vomiting currently resolved. Overall improved since last ED visit.  Pt states abdominal pain is cramping in nature, located at upper abdomen, non- radiating, 10/10.  Denies recent fever, chills, cough, SOB.  She went to HD today, was on machine for approximately 1 hour then stopped because she was feeling unwell.  Currently, she reports palpations and diarrhea that started today.          Review of patient's allergies indicates:   Allergen Reactions    Chloral hydrate Hallucinations     Other reaction(s): Hallucinations  Other reaction(s): Hives    Hydrocodone Other (See Comments)     Mental status changes    Tolerates oxycodone     Past Medical History:   Diagnosis Date    Anemia in ESRD (end-stage renal disease) 10/12/2015    dialysis tues, thursday, sat; access left arm    Chronic rejection of liver transplant 3/22/2016    Depression     Encounter for blood transfusion     ESRD on hemodialysis 9/30/2015    History of recent hospitalization 05/2018    pneumonia    History of splenomegaly 4/12/2016    Immunosuppressed 8/5/2017    Iron deficiency anemia secondary to inadequate dietary iron intake 8/16/2017    [facility]   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)    Moderate protein-calorie malnutrition 8/16/2017    MRSA bacteremia 8/6/2017 "    Pneumonia     Prophylactic immunotherapy 2014    Renovascular hypertension 10/2/2015    Secondary hyperparathyroidism 2017    Seizures     Sialadenitis 3/21/2018    Thrombocytopenia 2016     Past Surgical History:   Procedure Laterality Date    BIOPSY, LIVER, TRANSJUGULAR APPROACH N/A 2018    Performed by [facility] Diagnostic Provider at [facility] OR 2ND FLR    BIOPSY-LIVER N/A 2017    Performed by [facility] Diagnostic Provider at [facility] OR 2ND FLR    BIOPSY-LIVER N/A 3/22/2016    Performed by [facility] Diagnostic Provider at [facility] OR 2ND FLR     SECTION      x 2    CONIZATION-CERVICAL-LEEP N/A 6/15/2018    Performed by [facility]elam Marroquin MD at [facility] OR    [id number]; upper extremity Left 2015    Performed by Idalia Diaz MD at [facility] OR 2ND FLR    CRANIOPLASTY  2019    Performed by Jose Luis Powell MD at [facility] OR 2ND FLR    CRANIOTOMY-- left crani for clot evac with microscrope Left 2019    Performed by Jose Luis Powell MD at [facility] OR 2ND FLR    EMBOLIZATION, BLOOD VESSEL N/A 2018    Performed by Aren Ramos MD at [facility] CATH LAB    Exam Under Anesthesia N/A 2018    Performed by [facility]am [facility]roquin MD at [facility] OR 2ND FLR    Exam under anesthesia N/A 2018    Performed by [facility]am [facility]roquin MD at [facility] OR    Exam under anesthesia (ADD ON ) N/A 2018    Performed by NICKIE Alvarez MD at [facility] OR    Exam under anesthesia -cervical suturing  N/A 2018    Performed by Lei Sims III, MD at [facility] OR    EXCISION, NEOPLASM, SKULL with Cranioplasty Left 2019    Performed by Jose Luis Powell MD at [facility] OR 2ND FLR    FISTULOGRAM Left 2015    Performed by Idalia Diaz MD at [facility] CATH LAB    LIVER BIOPSY      LIVER TRANSPLANT  1992    PARATHYROIDECTOMY, Minimally Invasive Bilateral Exploration Bilateral 3/27/2019    Performed by Ashley Guallpa MD at [facility] OR 2ND FLR    SUTURE REPAIR,CERVIX  2018     Per[facility] at [facility] OR    TUBAL LIGATION  2010     Family History   Problem Relation Age of Onset    Hypertension Mother     Hypertension Father     Hypertension Paternal Grandmother     Alzheimer's disease Paternal Grandmother     Heart disease Paternal Grandmother     Cancer Sister     Heart attack Maternal Uncle     No Known Problems Brother     No Known Problems Brother     No Known Problems Sister     No Known Problems Sister     Melanoma Neg Hx     Breast cancer Neg Hx     Colon cancer Neg Hx     Ovarian cancer Neg Hx     Kidney disease Neg Hx     Stroke Neg Hx      Social History     Tobacco Use    Smoking status: Never Smoker    Smokeless tobacco: Never Used   Substance Use Topics    Alcohol use: No    Drug use: Never     Review of Systems   Constitutional: Negative for chills and fever.   HENT: Negative for congestion, rhinorrhea and trouble swallowing.    Eyes: Negative for redness.   Respiratory: Negative for cough and shortness of breath.    Cardiovascular: Positive for palpitations. Negative for chest pain and leg swelling.   Gastrointestinal: Positive for abdominal pain and diarrhea. Negative for nausea and vomiting.   Genitourinary:        Anuric   Musculoskeletal: Positive for myalgias. Negative for back pain, neck pain and neck stiffness.        (+) left arm fistula with palpable thrill   Skin: Negative for color change and rash.   Neurological: Negative for dizziness and syncope.   Psychiatric/Behavioral: Negative for confusion.       Physical Exam     Initial Vitals [08/22/19 0839]   BP Pulse Resp Temp SpO2   126/63 (!) 123 18 98.1 °F (36.7 °C) 98 %      MAP       --         Physical Exam    Nursing note and vitals reviewed.  Constitutional: She appears well-developed. No distress.   HENT:   Dry MM   Eyes: Conjunctivae and EOM are normal. Pupils are equal, round, and reactive to light. No scleral icterus.   Neck: No JVD present.   Cardiovascular:    Murmur heard.  tachycardia   Pulmonary/Chest: Breath sounds normal. She has no wheezes. She has no rales.   Abdominal: Soft. Bowel sounds are normal. There is tenderness (mild epigastric, periumbilical tenderness).   Musculoskeletal: Normal range of motion. She exhibits no edema or tenderness.   (+) left arm fistula with thrill   Lymphadenopathy:     She has no cervical adenopathy.   Neurological: She is alert and oriented to person, place, and time. She has normal strength. No cranial nerve deficit or sensory deficit.   Skin: Capillary refill takes less than 2 seconds. No rash noted.         ED Course   Procedures  Labs Reviewed   CBC W/ AUTO DIFFERENTIAL - Abnormal; Notable for the following components:       Result Value    RBC 5.87 (*)     Mean Corpuscular Volume 78 (*)     Mean Corpuscular Hemoglobin 25.9 (*)     RDW 18.6 (*)     Platelets 123 (*)     Immature Granulocytes 1.0 (*)     Immature Grans (Abs) 0.05 (*)     All other components within normal limits   COMPREHENSIVE METABOLIC PANEL - Abnormal; Notable for the following components:    Glucose 145 (*)     BUN, Bld 46 (*)     Creatinine 11.1 (*)     Calcium 7.6 (*)     Total Protein 9.2 (*)     Alkaline Phosphatase 381 (*)     Anion Gap 18 (*)     eGFR if  4.8 (*)     eGFR if non  4.2 (*)     All other components within normal limits   LIPASE - Abnormal; Notable for the following components:    Lipase 71 (*)     All other components within normal limits   LACTIC ACID, PLASMA     EKG Readings: (Independently Interpreted)   Sinus tachycardia with 121, LVH, twi in V5-V6, ANNY       Imaging Results          CT Abdomen Pelvis With Contrast (Final result)  Result time 08/22/19 14:32:40    Final result by Antoine Souza MD (08/22/19 14:32:40)                 Impression:      1. Postoperative changes of liver transplant.  Right hepatic lobe is small in size, stable in comparison to the prior.  2. Splenomegaly.  3. Moderate  volume ascites.  4. New bony lesions along the L5 and S2 are concerning for brown tumor with reported history of renal osteodystrophy.  Nevertheless, these lesions are nonspecific and aunt metastases not entirely excluded.  Correlation advised.  5. Additional findings as above.    Electronically signed by resident: Madeline Michel  Date:    08/22/2019  Time:    13:09    Electronically signed by: Antoine Souza MD  Date:    08/22/2019  Time:    14:32             Narrative:    EXAMINATION:  CT ABDOMEN PELVIS WITH CONTRAST    CLINICAL HISTORY:  Nausea, vomiting, diarrhea;    TECHNIQUE:  Routine axial CT images of the abdomen were obtained after administration 75cc of IV Omnipaque 350.  Coronal and Sagittal reformatted images were also obtained.    COMPARISON:  CT abdomen pelvis on 06/22/2018    FINDINGS:  Heart: Normal in Size.  No pericardial effusion.    Lungs: Redemonstration of small pulmonary nodule along the right middle lobe, measures 6.0 cm (series 2, image 3).  Otherwise, both lungs are well aerated, without consolidation or pleural effusion.    Liver: Postoperative changes of liver transplant.  Right hepatic lobe measures 8.9 cm, stable compared to the prior.  No focal hepatic lesions.    Gallbladder: Surgically removed.    Bile ducts: No evidence of dilated ducts.    Pancreas: No mass or peripancreatic fat stranding.    Spleen: Enlarged, measures 16.3 cm.    Adrenals: Unremarkable.    GI Tract/ Mesentery: No evidence of bowel obstruction or inflammation.    Kidneys/ Ureters: Small in size with no hydronephrosis or nephrolithiasis. No ureteral dilatation.    Bladder: No evidence of wall thickening.    Reproductive organs: Normal.    Retroperitoneum: No significant adenopathy.    Peritoneal space: Moderate volume ascites.    Abdominal wall: Unremarkable.    Vasculature: No significant atherosclerosis or aneursym of the aorta.    Bones: There are new bony lesions along the L5 and S2 concerning for brown  tumor (series 2, image 51 and image 61).  Sclerotic changes of the spine are noted again.  These findings are consistent with renal osteodystrophy.                               X-Ray Chest PA And Lateral (Final result)  Result time 08/22/19 10:05:05    Final result by Marlo More III, MD (08/22/19 10:05:05)                 Impression:      No concerning evidence of acute cardiopulmonary process    Electronically signed by resident: Puneet Shaw  Date:    08/22/2019  Time:    09:51    Electronically signed by: Marlo More MD  Date:    08/22/2019  Time:    10:05             Narrative:    EXAMINATION:  XR CHEST PA AND LATERAL    CLINICAL HISTORY:  SOB;    TECHNIQUE:  PA and lateral views of the chest were performed.    COMPARISON:  Chest radiograph 08/19/2019, 04/26/2019, 04/24/2019    FINDINGS:  Surgical clips overlying the neck.  Trachea and mediastinal structures are midline.  Cardiac silhouette is mildly enlarged. No focal evidence of any consolidation, pneumothorax, or pleural effusion. No acute osseous abnormality                                 Medical Decision Making:   History:   I obtained history from: someone other than patient.       <> Summary of History: Mother at bedside  Old Medical Records: I decided to obtain old medical records.  Old Records Summarized: records from previous admission(s).       <> Summary of Records: 8/19/2019:  Patient seen in the ED for similar complaint, labs unremarkable. Blood cultures no growth to date.  Initial Assessment:   Emergent evaluation a 28-year-old female history of ESRD on HD here with myalgias and tachycardia.  Patient reports some improvement since Monday but symptoms have not resolved.  Reports continue taking Tylenol at for myalgias.    Today on arrival, patient is tachycardic, afebrile.  BP on lower end of normal for patient.  She appears dry on exam.  Abdomen soft, mild tenderness.  No guarding.    Differential Diagnosis:   Electrolyte  derangement, hypovolemia, gastroenteritis, mesenteric ischemia, intra-abdominal infection  Clinical Tests:   Lab Tests: Ordered and Reviewed  Radiological Study: Ordered and Reviewed  Medical Tests: Reviewed and Ordered  ED Management:  - labs  - cardiac monitoring  - CXR  - fluid bolus 500 cc    Labs reviewed with no leukocytosis.  H and H is stable. CMP with CKD, at baseline.  Bicarb 18, elevated alk-phos 381.  Lipase 71.  Lactate 1.6.      10:45 AM  Patient re-evaluated, reports mild improvement of symptoms.  She remains tachycardic to the 120s.  Additional bolus IV fluids given.  CT abdomen pelvis ordered to further evaluate abdominal pain.    Bedside ultrasound performed by resident which shows flattened IVC, consistent with hypovolemia.  Additional IV fluids given.    Patient does report decreased p.o. intake for the past several days.  And 3 lb from dry weight.  Patient's tachycardia is likely secondary to hypovolemia.  I have low suspicion for infectious etiology as symptoms have been persistent since Monday with no significant changes.      I discussed the results of CT scan with patient at bedside, reporting to continue p.o. hydration.  She does report significant improvement of symptoms after IV fluids.  Tachycardia improved to 118 - 119.  I have recommended she follow up with Cardiology for further evaluation return to emergency room if symptoms get worse.    Update:  Bedside US showed flat IVC irrespective of respiration with hyperdynamic LV including nearly complete closure of LV cavity on systole.  Heart is hyperdynamic.  No WM abnormality  Concentric LV hypertrophy is present.  No pericardial effusion is present.  No B-lines are present.  Patient has received 500 mL bolus thus far and will receive second 1000 mL LR infusion over 2 hours with continued cardiac monitoring given HR of 121-122, which based on US findings is consistent with hypovolemia in setting of recent dialysis.  Patient furthermore  notes weight of 48.3 kg today at dialysis which is at variance with her normal weight of 51 kg.  With total of 1500 mL IV hydration, this should bring the patient back to her normal weight and volume status.    Jeff Powers MD  08/22/2019; 1401                      Clinical Impression:       ICD-10-CM ICD-9-CM   1. Myalgia M79.10 729.1   2. Tachycardia R00.0 785.0   3. Hypovolemia E86.1 276.52   4. Epigastric pain R10.13 789.06         Disposition:   Disposition: Discharged  Condition: Stable                        Eleanor Blanchard MD  08/22/19 8811

## 2019-08-22 NOTE — ED NOTES
All discharge instructions reviewed with patient and questions addressed. VSS and NAD noted. Patient A&Ox4 and following commands. All belongings with patient at time of departure. Patient ambulating from department without difficulty.

## 2019-08-22 NOTE — DISCHARGE INSTRUCTIONS
- continue p.o. Fluids  - follow up at scheduled dialysis  - make appointment with Cardiology  - return to emergency room if symptoms get worse

## 2019-08-22 NOTE — ED NOTES
Grahm crackers and 2 apple juices provided, 300 ccs of LR bolus remain. 12 lead ECG performed per order.

## 2019-08-22 NOTE — ED NOTES
Safety maintained, bed low and locked, call bell in reach, family at bedside, no needs verbalized at this time.

## 2019-09-03 PROBLEM — R00.0 TACHYCARDIA: Status: ACTIVE | Noted: 2019-01-01

## 2019-09-03 PROBLEM — M89.9 CHRONIC KIDNEY DISEASE-MINERAL AND BONE DISORDER: Chronic | Status: ACTIVE | Noted: 2018-01-01

## 2019-09-03 PROBLEM — I50.32 CHRONIC DIASTOLIC HEART FAILURE: Chronic | Status: ACTIVE | Noted: 2019-01-01

## 2019-09-03 PROBLEM — E83.9 CHRONIC KIDNEY DISEASE-MINERAL AND BONE DISORDER: Chronic | Status: ACTIVE | Noted: 2018-01-01

## 2019-09-03 PROBLEM — D72.819 LEUKOPENIA: Status: RESOLVED | Noted: 2018-05-16 | Resolved: 2019-01-01

## 2019-09-03 PROBLEM — E87.1 HYPONATREMIA: Status: RESOLVED | Noted: 2018-09-01 | Resolved: 2019-01-01

## 2019-09-03 PROBLEM — N18.9 CHRONIC KIDNEY DISEASE-MINERAL AND BONE DISORDER: Chronic | Status: ACTIVE | Noted: 2018-01-01

## 2019-09-03 PROBLEM — R11.2 INTRACTABLE VOMITING WITH NAUSEA: Status: ACTIVE | Noted: 2019-01-01

## 2019-09-03 NOTE — SUBJECTIVE & OBJECTIVE
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 ()    Moderate protein-calorie malnutrition 2017    MRSA bacteremia 2017    Pneumonia     Prophylactic immunotherapy 2014    Renovascular hypertension 10/2/2015    Secondary hyperparathyroidism 2017    Seizures     Sialadenitis 3/21/2018    Thrombocytopenia 2016       Past Surgical History:   Procedure Laterality Date    BIOPSY, LIVER, TRANSJUGULAR APPROACH N/A 2018    [facility]    BIOPSY-LIVER N/A 2017    [facility]    BIOPSY-LIVER N/A 3/22/2016    Performed by [facility] Provider at [facility] OR [address] [place] [address]     SECTION      x 2    CONIZATION-CERVICAL-LEEP N/A 6/15/2018    Performed by [facility]am [facility]quin [facility] at [facility] OR    [id number]; upper extremity Left 2015    [facility]    CRANIOPLASTY  2019    [facility]    CRANIOTOMY-- left crani for clot evac with microscrope Left 2019    [facility]    EMBOLIZATION, BLOOD VESSEL N/A 2018    Performed by [facility]ren Ramos [facility] at [facility] CATH LAB    Exam Under Anesthesia N/A 2018    [facility]    Exam under  anesthesia N/A 6/19/2018    Performed by [facility] at [facility] OR    Exam under anesthesia (ADD ON ) N/A 7/9/2018    Performed by NICKIE Alvarez MD at [facility] OR    Exam under anesthesia -cervical suturing  N/A 7/26/2018    Performed by Lei Sims III, MD at [facility] OR    EXCISION, NEOPLASM, SKULL with Cranioplasty Left 4/22/2019    Performed by [facility]    FISTULOGRAM Left 12/4/2015    Performed by [facility]alia [facility] at [facility] CATH LAB    LIVER BIOPSY      LIVER TRANSPLANT  09/1992    PARATHYROIDECTOMY, Minimally Invasive Bilateral Exploration Bilateral 3/27/2019    Performed by [facility]pa [facility] at [facility]    SUTURE REPAIR,CERVIX  6/19/2018    Performed by [facility]quin [facility] at [facility] OR    TUBAL LIGATION  2010       Review of patient's allergies indicates:   Allergen Reactions    Chloral hydrate Hallucinations     Other reaction(s): Hallucinations  Other reaction(s): Hives    Hydrocodone Other (See Comments)     Mental status changes    Tolerates oxycodone       No current facility-administered medications on file prior to encounter.      Current Outpatient Medications on File Prior to Encounter   Medication Sig    bacitracin 500 unit/gram ointment Apply topically as needed.    bisacodyl (DULCOLAX) 10 mg Supp Place 1 suppository (10 mg total) rectally once daily.    calcitRIOL (ROCALTROL) 0.5 MCG Cap Take 2 capsules (1 mcg total) by mouth 2 (two) times daily.    cloNIDine (CATAPRES) 0.3 MG tablet TAKE 1 TABLET BY MOUTH TWICE A DAY    epoetin anca-epbx (RETACRIT) 3,000 unit/mL injection Inject 1 mL (3,000 Units total) into the skin every Mon, Wed, Fri.    hydrALAZINE (APRESOLINE) 100 MG tablet Take 1 tablet (100 mg total) by mouth every 8 (eight) hours.    irbesartan (AVAPRO) 300 MG tablet Take 1 tablet (300 mg total) by mouth every morning.    isosorbide mononitrate (IMDUR) 30 MG 24 hr tablet Take 1 tablet (30 mg total) by mouth once  daily.    lacosamide (VIMPAT) 50 mg Tab Take 1 tablet (50 mg total) by mouth every 12 (twelve) hours.    levETIRAcetam (KEPPRA) 500 MG Tab Take 1 tablet (500 mg total) by mouth 2 (two) times daily.    minoxidil (LONITEN) 10 MG Tab Take 1 tablet (10 mg total) by mouth once daily.    ondansetron (ZOFRAN-ODT) 4 MG TbDL Take 1 tablet (4 mg total) by mouth every 6 (six) hours as needed.    pantoprazole (PROTONIX) 40 MG tablet Take 1 tablet (40 mg total) by mouth once daily.    polyethylene glycol (GLYCOLAX) 17 gram PwPk Take 17 g by mouth 2 (two) times daily.    senna-docusate 8.6-50 mg (PERICOLACE) 8.6-50 mg per tablet Take 2 tablets by mouth once daily.    sevelamer HCl (RENAGEL) 800 MG Tab Take 800 mg by mouth 3 (three) times daily with meals.    sodium chloride 1 gram tablet Take 2 g by mouth 2 (two) times daily.     tacrolimus (PROGRAF) 5 MG Cap Take 1 capsule (5 mg total) by mouth every 12 (twelve) hours.    verapamil (VERELAN) 240 MG C24P Take 1 capsule (240 mg total) by mouth every evening.     Family History     Problem Relation (Age of Onset)    Alzheimer's disease Paternal Grandmother    Cancer Sister    Heart attack Maternal Uncle    Heart disease Paternal Grandmother    Hypertension Mother, Father, Paternal Grandmother    No Known Problems Brother, Brother, Sister, Sister        Tobacco Use    Smoking status: Never Smoker    Smokeless tobacco: Never Used   Substance and Sexual Activity    Alcohol use: No    Drug use: Never    Sexual activity: Never     Partners: Male     Review of Systems   Constitutional: Positive for appetite change. Negative for activity change, chills, fatigue, fever and unexpected weight change.   HENT: Negative for congestion, rhinorrhea, sore throat, trouble swallowing and voice change.    Eyes: Negative for visual disturbance.   Respiratory: Positive for shortness of breath. Negative for cough, choking, chest tightness and wheezing.    Cardiovascular: Positive for  palpitations. Negative for chest pain and leg swelling.   Gastrointestinal: Positive for abdominal pain, nausea and vomiting. Negative for abdominal distention, anal bleeding, blood in stool, constipation and diarrhea.   Endocrine: Negative for cold intolerance, heat intolerance, polydipsia and polyuria.   Genitourinary: Negative for dysuria, flank pain, frequency, hematuria and urgency.   Musculoskeletal: Negative for arthralgias, back pain, joint swelling and myalgias.   Skin: Negative for color change and rash.   Neurological: Negative for dizziness, seizures, syncope, facial asymmetry, speech difficulty, weakness, light-headedness, numbness and headaches.   Hematological: Negative for adenopathy. Does not bruise/bleed easily.   Psychiatric/Behavioral: Negative for agitation, confusion, hallucinations and suicidal ideas.     Objective:     Vital Signs (Most Recent):  Temp: 98.4 °F (36.9 °C) (09/02/19 2248)  Pulse: (!) 122 (09/03/19 0454)  Resp: 15 (09/03/19 0454)  BP: (!) 171/103 (09/03/19 0454)  SpO2: 96 % (09/03/19 0452) Vital Signs (24h Range):  Temp:  [98.4 °F (36.9 °C)] 98.4 °F (36.9 °C)  Pulse:  [110-126] 122  Resp:  [14-24] 15  SpO2:  [95 %-98 %] 96 %  BP: (108-187)/() 171/103        There is no height or weight on file to calculate BMI.    Physical Exam   Constitutional: She is oriented to person, place, and time. She appears well-developed and well-nourished. No distress.   HENT:   Head: Normocephalic and atraumatic.   Eyes: Pupils are equal, round, and reactive to light.   Neck: Neck supple. Carotid bruit is not present. No thyromegaly present.   Cardiovascular: Normal rate and regular rhythm. Exam reveals no gallop.   No murmur heard.  Pulmonary/Chest: Effort normal and breath sounds normal. No respiratory distress. She has no wheezes.   Abdominal: Soft. Bowel sounds are normal. She exhibits no distension and no mass. There is no splenomegaly or hepatomegaly. There is tenderness.    Musculoskeletal: Normal range of motion. She exhibits no edema or deformity.   Neurological: She is alert and oriented to person, place, and time. No cranial nerve deficit or sensory deficit.   Skin: Skin is warm and dry. No rash noted.   Psychiatric: She has a normal mood and affect.         CRANIAL NERVES     CN III, IV, VI   Pupils are equal, round, and reactive to light.       Significant Labs:   CBC:   Recent Labs   Lab 09/03/19  0007   WBC 6.98   HGB 11.1*   HCT 34.6*   *     CMP:   Recent Labs   Lab 09/03/19  0007      K 3.8      CO2 19*   *   BUN 42*   CREATININE 8.4*   CALCIUM 7.0*   PROT 7.6   ALBUMIN 3.4*   BILITOT 1.0   ALKPHOS 431*   AST 29   ALT 29   ANIONGAP 16   EGFRNONAA 5.9*       Significant Imaging: I have reviewed all pertinent imaging results/findings within the past 24 hours.

## 2019-09-03 NOTE — ED NOTES
Hourly rounding complete. Patient resting in stretcher and is in NAD at this time. Pt is awake alert and oriented x4. Pt denies pain at this time. Pt aware of POC. Bed low and locked, SR up x2, call bell in patient reach. Pt remains on continuous cardiac monitor, continuous pulse ox, and auto BP cuff. Pt voices no needs at this time. Pt provided hospital phone to speak to sister.

## 2019-09-03 NOTE — ED PROVIDER NOTES
Encounter Date: 9/2/2019       History     Chief Complaint   Patient presents with    Palpitations     liver transplant and dialysis patient began having palpitations about an hour ago with some shortness of breath.  Has been battling a GI virus for the past week.  Last dialysis was Saturday.       Patient is a 28 year old female with PMHX of liver transplant 09/1992, d-CHF with 65%EF, HTN, pulmonary HTN, ESRD on dialysis (T,TH,SAT), anemia, secondary hyperparathyroidism, seizures, hx of MRSA bacteremia, and depression. She presents to the ED for palpitations. She reports having palpitations onset today at rest. Reports associated SOB and chronic generalized abdominal pain. Denies change in pain characterization. Denies home oxygen use. Reports last dialyzed on Saturday for three hours without complication. Patient is anuric. She denies hx of PE or DVT, recent surgery, trauma, immobilization, initiation of hormone therapy, or active cancer. She denies fever,chills, nausea, vomiting, chest pain, dysuria, diarrhea, or constipation. She is a non smoker and denies alcohol use.    The history is provided by the patient and medical records. No  was used.     Review of patient's allergies indicates:   Allergen Reactions    Chloral hydrate Hallucinations     Other reaction(s): Hallucinations  Other reaction(s): Hives    Hydrocodone Other (See Comments)     Mental status changes    Tolerates oxycodone     Past Medical History:   Diagnosis Date    Anemia in ESRD (end-stage renal disease) 10/12/2015    dialysis tues, thursday, sat; access left arm    Chronic rejection of liver transplant 3/22/2016    Depression     Encounter for blood transfusion     ESRD on hemodialysis 9/30/2015    History of recent hospitalization 05/2018    pneumonia    History of splenomegaly 4/12/2016    Immunosuppressed 8/5/2017    Iron deficiency anemia secondary to inadequate dietary iron intake 8/16/2017    She  receives [facility] iron [facility] at the [facility].   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 ()    Moderate protein-calorie malnutrition 2017    MRSA bacteremia 2017    Pneumonia     Prophylactic immunotherapy 2014    Renovascular hypertension 10/2/2015    Secondary hyperparathyroidism 2017    Seizures     Sialadenitis 3/21/2018    Thrombocytopenia 2016     Past Surgical History:   Procedure Laterality Date    BIOPSY, LIVER, TRANSJUGULAR APPROACH N/A 2018    Performed by [facility] Diagnostic Provider at [facility] OR [facility]    BIOPSY-LIVER N/A 2017    Performed by [facility] Diagnostic Provider at [facility]    BIOPSY-LIVER N/A 3/22/2016    Performed by [facility] Diagnostic Provider at [facility] OR [address] [place] [address]     SECTION      x 2    CONIZATION-CERVICAL-LEEP N/A 6/15/2018    Performed by [facility]quin [facility] at [facility] OR    [id number]-ML; upper extremity Left 2015    Performed by Idalia Diaz MD at [facility] OR [facility]    CRANIOPLASTY  2019    Performed by Jose Luis Powell MD at [facility]    CRANIOTOMY-- left crani for clot evac with microscrope Left 2019    Performed by Jose Luis Powell MD at [facility] OR [facility]    EMBOLIZATION, BLOOD VESSEL N/A 2018    Performed by Aren Ramos MD at [facility] CATH LAB    Exam Under Anesthesia N/A 2018    Performed by Ne[facility]am [facility]quin MD at [facility] OR [facility]    Exam under anesthesia N/A 2018    Performed by [facility]am [facility]quin MD at [facility] OR    Exam under anesthesia (ADD ON ) N/A 2018    Performed by NICKIE Alvarez MD at [facility] OR    Exam under anesthesia -cervical suturing  N/A 2018    Performed by Lei Sims III, MD at [facility] OR    EXCISION, NEOPLASM, SKULL with Cranioplasty Left 2019    Performed by Jose [facility] MD at [facility]    FISTULOGRAM Left 2015    Performed by Idalia Diaz MD at [facility] CATH LAB    LIVER BIOPSY       LIVER TRANSPLANT  09/1992    PARATHYROIDECTOMY, Minimally Invasive Bilateral Exploration Bilateral 3/27/2019    [facility]    SUTURE REPAIR,CERVIX  6/19/2018    [facility]    TUBAL LIGATION  2010     Family History   Problem Relation Age of Onset    Hypertension Mother     Hypertension Father     Hypertension Paternal Grandmother     Alzheimer's disease Paternal Grandmother     Heart disease Paternal Grandmother     Cancer Sister     Heart attack Maternal Uncle     No Known Problems Brother     No Known Problems Brother     No Known Problems Sister     No Known Problems Sister     Melanoma Neg Hx     Breast cancer Neg Hx     Colon cancer Neg Hx     Ovarian cancer Neg Hx     Kidney disease Neg Hx     Stroke Neg Hx      Social History     Tobacco Use    Smoking status: Never Smoker    Smokeless tobacco: Never Used   Substance Use Topics    Alcohol use: No    Drug use: Never     Review of Systems   Constitutional: Negative for fever.   HENT: Negative for sore throat.    Respiratory: Positive for shortness of breath.    Cardiovascular: Positive for palpitations. Negative for chest pain.   Gastrointestinal: Positive for abdominal pain. Negative for nausea and vomiting.   Genitourinary: Negative for dysuria.   Musculoskeletal: Negative for back pain.   Skin: Negative for rash.   Allergic/Immunologic: Positive for immunocompromised state.   Neurological: Negative for weakness.   Hematological: Does not bruise/bleed easily.       Physical Exam     Initial Vitals [09/02/19 2248]   BP Pulse Resp Temp SpO2   108/69 110 (!) 24 98.4 °F (36.9 °C) 98 %      MAP       --         Physical Exam    Vitals reviewed.  Constitutional: She appears well-developed and well-nourished. No distress.   HENT:   Head: Normocephalic.   Eyes: Conjunctivae are normal.   Neck: Normal range of motion.   Cardiovascular: Regular rhythm.  Tachycardia present.    Murmur heard.  Pulmonary/Chest: Breath sounds normal. No respiratory distress. She has no wheezes. She has no rales.   Abdominal: Soft. Bowel sounds are normal. She exhibits no distension. There is no tenderness.   Musculoskeletal: Normal range of motion.   AV fistula noted to L wrist with palpable thrill.   Neurological: She is alert and oriented to person, place, and time.   Skin: Skin is warm and dry. No erythema.         ED Course   Procedures  Labs Reviewed   CBC W/ AUTO DIFFERENTIAL - Abnormal; Notable for the following components:       Result Value    Hemoglobin 11.1 (*)     Hematocrit 34.6 (*)     Mean Corpuscular Volume 79 (*)     Mean Corpuscular Hemoglobin 25.2 (*)     RDW 17.4 (*)     Platelets 104 (*)     Immature Granulocytes 1.6 (*)     Immature Grans (Abs) 0.11 (*)     Lymph # 0.9 (*)     Lymph% 12.9 (*)     All other components within normal limits   COMPREHENSIVE METABOLIC PANEL - Abnormal; Notable for the following components:    CO2 19 (*)     Glucose 112 (*)     BUN, Bld 42 (*)     Creatinine 8.4 (*)     Calcium 7.0 (*)     Albumin 3.4 (*)     Alkaline Phosphatase 431 (*)     eGFR if  6.8 (*)     eGFR if non  5.9 (*)     All other components within normal limits   CBC W/ AUTO DIFFERENTIAL - Abnormal; Notable for the following components:    Hemoglobin 11.7 (*)     Mean Corpuscular Volume 80 (*)     Mean Corpuscular Hemoglobin 25.2 (*)     Mean Corpuscular Hemoglobin Conc 31.5 (*)     RDW 17.7 (*)     Platelets 105 (*)     Immature Granulocytes 4.6 (*)     Immature Grans (Abs) 0.28 (*)     Lymph # 0.7 (*)     nRBC 1 (*)     Lymph% 11.7 (*)     All other components within normal limits   COMPREHENSIVE METABOLIC PANEL - Abnormal; Notable for the following components:    CO2 19 (*)     Glucose 119 (*)     BUN, Bld 42 (*)     Creatinine 8.5 (*)     Calcium 6.9 (*)     Albumin 3.3 (*)     Alkaline Phosphatase 444 (*)     eGFR if   6.7 (*)     eGFR if non  5.8 (*)     All other components within normal limits   PTH, INTACT - Abnormal; Notable for the following components:    PTH, Intact 531.0 (*)     All other components within normal limits   PROCALCITONIN - Abnormal; Notable for the following components:    Procalcitonin 0.53 (*)     All other components within normal limits   B-TYPE NATRIURETIC PEPTIDE - Abnormal; Notable for the following components:    BNP 1,269 (*)     All other components within normal limits   CULTURE, BLOOD   CULTURE, BLOOD   RESPIRATORY VIRAL PANEL BY PCR   LIPASE   MAGNESIUM   HCG, QUANTITATIVE, PREGNANCY   MAGNESIUM   TSH   PHOSPHORUS   LACTIC ACID, PLASMA   TACROLIMUS LEVEL   LEVETIRACETAM  (KEPPRA) LEVEL   LACOSAMIDE (VIMPAT)   HEMOGLOBIN A1C   TACROLIMUS LEVEL          Imaging Results          CT Abdomen Pelvis  Without Contrast (In process)  Result time 09/03/19 07:17:00               X-Ray Abdomen AP 1 View (KUB) (Final result)  Result time 09/03/19 04:39:29    Final result by Rubio Georges MD (09/03/19 04:39:29)                 Impression:      Mildly distended gas-filled small bowel loops in the right lower quadrant.  Findings nonspecific and could relate to a localized ileus or partial small bowel obstruction.      Electronically signed by: Rubio Georges MD  Date:    09/03/2019  Time:    04:39             Narrative:    EXAMINATION:  XR ABDOMEN AP 1 VIEW    CLINICAL HISTORY:  Vomiting, unspecified    TECHNIQUE:  Single AP View of the abdomen was performed.    COMPARISON:  May 3, 2019    FINDINGS:  Surgical clips right upper quadrant.  There are no calcifications overlying the kidneys. Mildly distended gas-filled small bowel loops in the right lower quadrant.  Regional osseous structures are unchanged.                               X-Ray Chest AP Portable (Final result)  Result time 09/03/19 00:18:02    Final result by Rubio Georges MD (09/03/19 00:18:02)                  Impression:      No acute findings in the chest.    Stable cardiomegaly.      Electronically signed by: Rubio Georges MD  Date:    09/03/2019  Time:    00:18             Narrative:    EXAMINATION:  XR CHEST AP PORTABLE    CLINICAL HISTORY:  palpitation;    TECHNIQUE:  Single frontal view of the chest was performed.    COMPARISON:  August 22, 2018.    FINDINGS:  Surgical clips overlying the neck, unchanged.    No consolidation, pleural effusion or pneumothorax.    Cardiomediastinal silhouette is enlarged, stable.                                 Medical Decision Making:   History:   Old Medical Records: I decided to obtain old medical records.  Clinical Tests:   Lab Tests: Ordered and Reviewed  Radiological Study: Ordered and Reviewed  Medical Tests: Ordered and Reviewed  Other:   I have discussed this case with another health care provider.       <> Summary of the Discussion: Hospital medicine for admission.        APC / Resident Notes:   Patient is a 28 year old female presents to the ED for emergent evaluation of palpitations.     Will order labs and imaging. Will continue to monitor.     Differential diagnoses include, but are not limited to: transplant rejection, gastroenteritis, dehydration, cardiac arrhythmia, or electrolyte imbalance.     No leukocytosis. Hemodynamically stable. Thrombocytopenia. Elevated BUN 42 and Cr 8.4. Patient with known hx of ESRD on dialysis. HCG, QUANT negative. Tacrolimus level pending. CXR found to have no acute findings in the chest. Stable cardiomegaly.    Will admit to medicine for observation given persistent tachycardia despite 1.7 L IVF and inability to tolerate PO. Xray KUB pending per HM request.    I have discussed and reviewed with my supervising physician.        Clinical Impression:       ICD-10-CM ICD-9-CM   1. Intractable vomiting with nausea, unspecified vomiting type R11.2 536.2   2. ESRD on dialysis N18.6 585.6    Z99.2 V45.11   3. Emesis R11.10 787.03   4.  Tachycardia R00.0 785.0         Disposition:   Disposition: Placed in Observation  Condition: Stable                        Stacey Ortiz PA-C  09/03/19 0723

## 2019-09-03 NOTE — ED NOTES
Patient identifiers verified verbally with patient and armband and correct for Holly Patel.    LOC/ APPEARANCE: The patient is sleeping on entry to room, easily aroused with verbal stimuli, oriented x4. Pt is speaking appropriately, no slurred speech. Continuous cardiac monitor, cont pulse ox, and auto BP cuff remains on patient. Pt is clean and well groomed. No JVD visible. Pt reports pain level of 0 at this time. Pt updated on POC, aware of admission. Bed low and locked with side rails up x2, call bell in pt reach. Fall band and allergy band applied to patient, limb alert noted to left upper arm s/t dialysis access. Patient repositions self in stretcher for comfort.  SKIN: Skin is warm dry and intact, and color is consistent with ethnicity. Capillary refill <3 seconds. No breakdown or brusing visible. Mucus membranes moist, acyanotic.  RESPIRATORY: Airway is open and patent. Respirations-spontaneous, unlabored, regular rate, equal bilaterally on inspiration and expiration. No accessory muscle use noted. Lungs clear to auscultation in all fields bilaterally anterior and posterior.   CARDIAC: Patient tachycardic. No peripheral edema noted, and patient has no c/o chest pain. Peripheral pulses present equal and strong throughout.  ABDOMEN: Soft and non-tender to palpation with no distention noted.  NEUROLOGIC: Eyes open spontaneously and facial expression symmetrical. Pt behavior appropriate to situation, and pt follows commands. Pt reports sensation present in all extremities when touched with a finger, denies any numbness or tingling. PERRLA  MUSCULOSKELETAL: Spontaneous movement noted to all extremities. Hand  equal and leg strength strong +5 bilaterally.   : Pt anuric s/t dialysis patient.

## 2019-09-03 NOTE — ED NOTES
Report taken from Ashlee RN;  Pt asleep; resting quietly on stretcher.  Pt remains on continuous cardiac and pulse ox monitoring with non-invasive blood pressure to cycle every 30 minutes. Sinus tach noted. No acute distress or discomfort observed.  Pt is able to reposition self on stretcher. Bed locked in lowest position; side rails up and locked x 2; call light, bedside table, and personal belongings within reach. Room assessed for safety measures and cleanliness; no action needed at this time. Plan of care discussed.  Will continue to monitor.

## 2019-09-03 NOTE — ASSESSMENT & PLAN NOTE
Abdominal Pain    - Patient received and vomited GI cocktail in the ED.  Minimal improvement with IV Zofran.  - X-ray abdomen showed mildly distended gas-filled small bowel loops in the right lower quadrant.  Findings nonspecific and could relate to a localized ileus or partial small bowel obstruction.  - Will order CT abdomen/pelvis and keep NPO.  - Supportive care, anti-emetics.  - Home bowel regimen:  Dulcolax 10mg suppository daily, Glycolax 17g BID, Pericolace daily.  Held pending CT results.  Will need to review.

## 2019-09-03 NOTE — ASSESSMENT & PLAN NOTE
The patient is a 29 yo F being admitted with a palpitations and concern for a bowel ileus. The patient presented to the hospital with complaints of palpitations x 12 hrs and abdominal pain, nausea, and vomiting. X-ray abdomen showed mildly distended gas-filled small bowel loops in the right lower quadrant.  Findings nonspecific and could relate to a localized ileus or partial small bowel obstruction. Pending CT abdomen/pelvis. Last ECHO 09/2018, EF 65-70%.     T/Th/Sat - [facility]  Dr. Bass   EDW ~48.5 kg  (last adjusted 8/3)  NIRAV BRADLEY  Minimum urine     Assessment:   - Will provide HD today per outpatient HD schedule, will dialyze today for metabolic clearance and volume management  - Target UF 2-3 L as tolerated, keep MAP >65  - Patient appears to 3.2 kg above her dry weight which was recently adjusted to 48.5 kg on last month (8/3)  - Continue home clonidine 0.3mg BID, hydralazine 100mg TID, irbesartan 300mg daily, isosorbide mononitrate 30mg daily, minoxidil 10mg daily, verapamil 240mg qHS.  - Agree with repeat Echo   - Liberalize diet   - Phos 2.7, will likely give phos replacements after HD today today  - Please hold binders at this time   - Continue to monitor intake and output, daily weights   - Avoid nephrotoxic medication and renal dose medications to GFR  - Will discuss with staff

## 2019-09-03 NOTE — PROGRESS NOTES
Maintenance hemodialysis tx started via left lower arm fistula, 15g blunt needles used to cannulate buttonhole, tolerated well, flows good. Dialysis days are T,Th,Sat

## 2019-09-03 NOTE — ASSESSMENT & PLAN NOTE
- Being follow by primary care team   - Abdominal x-ray revealing mildly distended gas-filled small bowel loops in the right lower quadrant.  Findings nonspecific and could relate to a localized ileus or partial small bowel obstruction.   - Pending CT abdomen/pelvis.

## 2019-09-03 NOTE — ASSESSMENT & PLAN NOTE
History of Hyperparathyroidism s/p parathyroidectomy. History of noncompliance with calcium replacements.     - CoCa 7.46  - On Calcitriol 1 mcg BID, will adjust dosing to 2 mcg BID per OP Nephrologist   - Will adjust calcium bath during dialysis   - Will order iCA and Vitamin D   - PTH elevated 531.0 from 406.9 in April 2019

## 2019-09-03 NOTE — H&P
[facility] Hospital Medicine  History & Physical    Patient Name: Holly Patel  MRN: [id number]  Admission Date: 9/2/2019  Attending Physician: Dylan Feliciano MD   Primary Care Provider: Stan Sosa MD    [facility] Medicine Team: [facility] MED T Amy Garcia PA-C     Patient information was obtained from patient, past medical records and ER records.     Subjective:     Principal Problem:Intractable vomiting with nausea    Chief Complaint:   Chief Complaint   Patient presents with    Palpitations     liver transplant and dialysis patient began having palpitations about an hour ago with some shortness of breath.  Has been battling a GI virus for the past week.  Last dialysis was Saturday.          HPI: Patient is a 28 year old lady with a h/o of liver transplant (9/1992), chronic diastolic HF, HTN, pulmonary HTN, ESRD on HD, anemia, secondary parathyroidism, seizures.  She presents with palpitations.  Patient states that palpitations began last night while at rest.  She notes associated SOB, but denies dizziness and chest pain.  SOB is worse when she lies flat, denies worsening with exertion.  Denies BLE edema, weight gain.  She does report worsening abdominal pain, N/V (last episode in ED).  She denies diarrhea, constipation.  [facility].  She completed HD successfully on Saturday.  She does not make urine.  She denies h/o PE/DVT, recent immobilization, extended travel, recent surgery, active cancer, fever/chills.  She does not smoke.    Of note, patient was seen in the ED with similar complaints of tachycardia, N/V, and abdominal pain on 8/22/2019.  Symptoms improved following IVFs.  HR improved from 120s to 110s.      Past Medical History:   Diagnosis Date    Anemia in ESRD (end-stage renal disease) 10/12/2015    dialysis tues, thursday, sat; access left arm    Chronic rejection of liver transplant 3/22/2016    Depression     Encounter for blood transfusion      ESRD on hemodialysis 2015    History of recent hospitalization 2018    pneumonia    History of splenomegaly 2016    Immunosuppressed 2017    Iron deficiency anemia secondary to inadequate dietary iron intake 2017    She [facility] iron [facility].   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 ()    Moderate protein-calorie malnutrition 2017    MRSA bacteremia 2017    Pneumonia     Prophylactic immunotherapy 2014    Renovascular hypertension 10/2/2015    Secondary hyperparathyroidism 2017    Seizures     Sialadenitis 3/21/2018    Thrombocytopenia 2016       Past Surgical History:   Procedure Laterality Date    BIOPSY, LIVER, TRANSJUGULAR APPROACH N/A 2018    Performed by [facility] at [facility] OR 2ND FLR    BIOPSY-LIVER N/A 2017    Performed by [facility] at [facility] OR 2ND FLR    BIOPSY-LIVER N/A 3/22/2016    Performed by [facility] at [facility] OR 2ND FLR     SECTION      x 2    CONIZATION-CERVICAL-LEEP N/A 6/15/2018    Performed by Neelam Marroquin MD at [facility] OR    [id number]-FL; upper extremity Left 2015    Performed by Idalia Diaz MD at [facility] OR 2ND FLR    CRANIOPLASTY  2019    Performed by Jose Luis Powell MD at [facility] OR 2ND FLR    CRANIOTOMY-- left crani for clot evac with microscrope Left 2019    Performed by Jose [facility] Powell MD at [facility] OR 2ND FLR    EMBOLIZATION, BLOOD VESSEL N/A 2018    Performed by Aren Ramos MD at [facility] CATH LAB    Exam Under Anesthesia N/A 2018    Performed by [facility]am [facility]quin [facility] at [facility] OR 2ND FLR    Exam under anesthesia N/A 2018    Performed by [facility]am [facility]roquin MD at [facility] OR    Exam under anesthesia (ADD ON ) N/A 2018    Performed by NICKIE Alvarez MD at [facility] OR    Exam under anesthesia -cervical suturing  N/A 2018    Performed by Lei  TATA Sims III, MD at [facility] OR    EXCISION, NEOPLASM, SKULL with Cranioplasty Left 4/22/2019    Performed by [facility] Powell [facility] at [facility] OR 2ND FLR    FISTULOGRAM Left 12/4/2015    Performed by Idalia Diaz MD at [facility] CATH LAB    LIVER BIOPSY      LIVER TRANSPLANT  09/1992    PARATHYROIDECTOMY, Minimally Invasive Bilateral Exploration Bilateral 3/27/2019    Performed by Ashley Guallpa MD at [facility] OR 2ND FLR    SUTURE REPAIR,CERVIX  6/19/2018    Performed by [facility]am [facility]quin MD at [facility] OR    TUBAL LIGATION  2010       Review of patient's allergies indicates:   Allergen Reactions    Chloral hydrate Hallucinations     Other reaction(s): Hallucinations  Other reaction(s): Hives    Hydrocodone Other (See Comments)     Mental status changes    Tolerates oxycodone       No current facility-administered medications on file prior to encounter.      Current Outpatient Medications on File Prior to Encounter   Medication Sig    bacitracin 500 unit/gram ointment Apply topically as needed.    bisacodyl (DULCOLAX) 10 mg Supp Place 1 suppository (10 mg total) rectally once daily.    calcitRIOL (ROCALTROL) 0.5 MCG Cap Take 2 capsules (1 mcg total) by mouth 2 (two) times daily.    cloNIDine (CATAPRES) 0.3 MG tablet TAKE 1 TABLET BY MOUTH TWICE A DAY    epoetin anca-epbx (RETACRIT) 3,000 unit/mL injection Inject 1 mL (3,000 Units total) into the skin every Mon, Wed, Fri.    hydrALAZINE (APRESOLINE) 100 MG tablet Take 1 tablet (100 mg total) by mouth every 8 (eight) hours.    irbesartan (AVAPRO) 300 MG tablet Take 1 tablet (300 mg total) by mouth every morning.    isosorbide mononitrate (IMDUR) 30 MG 24 hr tablet Take 1 tablet (30 mg total) by mouth once daily.    lacosamide (VIMPAT) 50 mg Tab Take 1 tablet (50 mg total) by mouth every 12 (twelve) hours.    levETIRAcetam (KEPPRA) 500 MG Tab Take 1 tablet (500 mg total) by mouth 2 (two) times daily.    minoxidil (LONITEN) 10 MG Tab Take 1 tablet (10 mg  total) by mouth once daily.    ondansetron (ZOFRAN-ODT) 4 MG TbDL Take 1 tablet (4 mg total) by mouth every 6 (six) hours as needed.    pantoprazole (PROTONIX) 40 MG tablet Take 1 tablet (40 mg total) by mouth once daily.    polyethylene glycol (GLYCOLAX) 17 gram PwPk Take 17 g by mouth 2 (two) times daily.    senna-docusate 8.6-50 mg (PERICOLACE) 8.6-50 mg per tablet Take 2 tablets by mouth once daily.    sevelamer HCl (RENAGEL) 800 MG Tab Take 800 mg by mouth 3 (three) times daily with meals.    sodium chloride 1 gram tablet Take 2 g by mouth 2 (two) times daily.     tacrolimus (PROGRAF) 5 MG Cap Take 1 capsule (5 mg total) by mouth every 12 (twelve) hours.    verapamil (VERELAN) 240 MG C24P Take 1 capsule (240 mg total) by mouth every evening.     Family History     Problem Relation (Age of Onset)    Alzheimer's disease Paternal Grandmother    Cancer Sister    Heart attack Maternal Uncle    Heart disease Paternal Grandmother    Hypertension Mother, Father, Paternal Grandmother    No Known Problems Brother, Brother, Sister, Sister        Tobacco Use    Smoking status: Never Smoker    Smokeless tobacco: Never Used   Substance and Sexual Activity    Alcohol use: No    Drug use: Never    Sexual activity: Never     Partners: Male     Review of Systems   Constitutional: Positive for appetite change. Negative for activity change, chills, fatigue, fever and unexpected weight change.   HENT: Negative for congestion, rhinorrhea, sore throat, trouble swallowing and voice change.    Eyes: Negative for visual disturbance.   Respiratory: Positive for shortness of breath. Negative for cough, choking, chest tightness and wheezing.    Cardiovascular: Positive for palpitations. Negative for chest pain and leg swelling.   Gastrointestinal: Positive for abdominal pain, nausea and vomiting. Negative for abdominal distention, anal bleeding, blood in stool, constipation and diarrhea.   Endocrine: Negative for cold  intolerance, heat intolerance, polydipsia and polyuria.   Genitourinary: Negative for dysuria, flank pain, frequency, hematuria and urgency.   Musculoskeletal: Negative for arthralgias, back pain, joint swelling and myalgias.   Skin: Negative for color change and rash.   Neurological: Negative for dizziness, seizures, syncope, facial asymmetry, speech difficulty, weakness, light-headedness, numbness and headaches.   Hematological: Negative for adenopathy. Does not bruise/bleed easily.   Psychiatric/Behavioral: Negative for agitation, confusion, hallucinations and suicidal ideas.     Objective:     Vital Signs (Most Recent):  Temp: 98.4 °F (36.9 °C) (09/02/19 2248)  Pulse: (!) 122 (09/03/19 0454)  Resp: 15 (09/03/19 0454)  BP: (!) 171/103 (09/03/19 0454)  SpO2: 96 % (09/03/19 0452) Vital Signs (24h Range):  Temp:  [98.4 °F (36.9 °C)] 98.4 °F (36.9 °C)  Pulse:  [110-126] 122  Resp:  [14-24] 15  SpO2:  [95 %-98 %] 96 %  BP: (108-187)/() 171/103        There is no height or weight on file to calculate BMI.    Physical Exam   Constitutional: She is oriented to person, place, and time. She appears well-developed and well-nourished. No distress.   HENT:   Head: Normocephalic and atraumatic.   Eyes: Pupils are equal, round, and reactive to light.   Neck: Neck supple. Carotid bruit is not present. No thyromegaly present.   Cardiovascular: Normal rate and regular rhythm. Exam reveals no gallop.   No murmur heard.  Pulmonary/Chest: Effort normal and breath sounds normal. No respiratory distress. She has no wheezes.   Abdominal: Soft. Bowel sounds are normal. She exhibits no distension and no mass. There is no splenomegaly or hepatomegaly. There is tenderness.   Musculoskeletal: Normal range of motion. She exhibits no edema or deformity.   Neurological: She is alert and oriented to person, place, and time. No cranial nerve deficit or sensory deficit.   Skin: Skin is warm and dry. No rash noted.   Psychiatric: She has a  normal mood and affect.         CRANIAL NERVES     CN III, IV, VI   Pupils are equal, round, and reactive to light.       Significant Labs:   CBC:   Recent Labs   Lab 09/03/19  0007   WBC 6.98   HGB 11.1*   HCT 34.6*   *     CMP:   Recent Labs   Lab 09/03/19  0007      K 3.8      CO2 19*   *   BUN 42*   CREATININE 8.4*   CALCIUM 7.0*   PROT 7.6   ALBUMIN 3.4*   BILITOT 1.0   ALKPHOS 431*   AST 29   ALT 29   ANIONGAP 16   EGFRNONAA 5.9*       Significant Imaging: I have reviewed all pertinent imaging results/findings within the past 24 hours.    Assessment/Plan:     * Intractable vomiting with nausea  Abdominal Pain    - Patient received and vomited GI cocktail in the ED.  Minimal improvement with IV Zofran.  - X-ray abdomen showed mildly distended gas-filled small bowel loops in the right lower quadrant.  Findings nonspecific and could relate to a localized ileus or partial small bowel obstruction.  - Will order CT abdomen/pelvis and keep NPO.  - Supportive care, anti-emetics.  - Home bowel regimen:  Dulcolax 10mg suppository daily, Glycolax 17g BID, Pericolace daily.  Held pending CT results.  Will need to review.      Tachycardia  - Patient received NS 500cc bolus x2 in ED without improvement in tachycardia.  - ECG shows sinus tach with PVCs.  Will continue to monitor on telemetry.  - Patient is afebrile with no evidence of leukocytosis.  - Mg 2.1, K 3.8.  - WELLS score of 1.5.  - Will check blood cultures.  - Of note, patient was seen outpatient by cardiology on 8/12/2019.  At that time she was also noted to be complaining of palpitations, particularly around time of HD.    ESRD on hemodialysis  Anemia in ESRD (end-stage renal disease)  Chronic kidney disease-mineral and bone disorder  Secondary hyperparathyroidism    - [facility]  Does not make urine.  - Received 1L NS in ED.  - Will consult nephrology.  - H/H 11.1/34.6.  - Will check PTH.  - Continue calcitriol 1mcg BID,  sevelamer carbonate 800mg TID.    Chronic diastolic heart failure  - Stress echo in October 2018 showed EF 60-65% with grade 2 diastolic dysfunction and PAP 40mmHg.  - Will check BNP.  - Given tachycardia and orthopnea, will repeat echo.      Seizures  - Continue Keppra 500mg BID and Vimpat 50mg BID.  - Will check levels.  - Seizure precautions.      Renovascular hypertension  - Continue home clonidine 0.3mg BID, hydralazine 100mg TID, irbesartan 300mg daily, isosorbide mononitrate 30mg daily, minoxidil 10mg daily, verapamil 240mg qHS.      Liver replaced by transplant  Immunosuppressed    - Continue Prograf 5mg BID.  - Will monitor level.  - LFTs stable and at baseline.    VTE Risk Mitigation (From admission, onward)        Ordered     Place sequential compression device  Until discontinued      09/03/19 0503     Place LINDA hose  Until discontinued      09/03/19 0503     IP VTE LOW RISK PATIENT  Once      09/03/19 0503             Amy Garcia PA-C  Department of Hospital Medicine   [facility]

## 2019-09-03 NOTE — ASSESSMENT & PLAN NOTE
- Stress echo in October 2018 showed EF 60-65% with grade 2 diastolic dysfunction and PAP 40mmHg.  - Will check BNP.  - Given tachycardia and orthopnea, will repeat echo.

## 2019-09-03 NOTE — ASSESSMENT & PLAN NOTE
- Continue home clonidine 0.3mg BID, hydralazine 100mg TID, irbesartan 300mg daily, isosorbide mononitrate 30mg daily, minoxidil 10mg daily, verapamil 240mg qHS.

## 2019-09-03 NOTE — HPI
Patient is a 28 year old lady with a h/o of liver transplant (9/1992), chronic diastolic HF, HTN, pulmonary HTN, ESRD on HD, anemia, secondary parathyroidism, seizures.  She presents with palpitations.  Patient states that palpitations began last night while at rest.  She notes associated SOB, but denies dizziness and chest pain.  SOB is worse when she lies flat, denies worsening with exertion.  Denies BLE edema, weight gain.  She does report worsening abdominal pain, N/V (last episode in ED).  She denies diarrhea, constipation.  She is on HD [facility].  She completed HD successfully on Saturday.  She does not make urine.  She denies h/o PE/DVT, recent immobilization, extended travel, recent surgery, active cancer, fever/chills.  She does not smoke.    Of note, patient was seen in the ED with similar complaints of tachycardia, N/V, and abdominal pain on 8/22/2019.  Symptoms improved following IVFs.  HR improved from 120s to 110s.

## 2019-09-03 NOTE — PROGRESS NOTES
[facility] NOTE     Patient currently on hemodialysis for removal of uremic toxins .     Patient seen and evaluated on hemodialysis, tolerating treatment, see HD flowsheet for vitals and assessments.      No Hypotension, chest pain, shortness of breath, cramping, nausea or vomiting.      Target UF 4 L as tolerated, keep MAP >65. Patient 9.2 kg above dry weight of 48.5 kg. Pre HD weight today 57.7 kg.     Will possibly plan for UF only treatment tomorrow for volume management.     TED Russell DNP, APRN, FNP-C  Department of Nephrology  [facility]  Pager: [phone]

## 2019-09-03 NOTE — PROGRESS NOTES
Transferred from unit via stretcher by transport to room 643 A, tele monitor in place. @ 9915 report given to Jerson PRUITT

## 2019-09-03 NOTE — ASSESSMENT & PLAN NOTE
Anemia in ESRD (end-stage renal disease)  Chronic kidney disease-mineral and bone disorder  Secondary hyperparathyroidism    - Patient on [facility].  Does not make urine.  - Received 1L NS in ED.  - Will consult nephrology.  - H/H 11.1/34.6.  - Will check PTH.  - Continue calcitriol 1mcg BID, sevelamer carbonate 800mg TID.

## 2019-09-03 NOTE — ASSESSMENT & PLAN NOTE
- Patient received NS 500cc bolus x2 in ED without improvement in tachycardia.  - ECG shows sinus tach with PVCs.  Will continue to monitor on telemetry.  - Patient is afebrile with no evidence of leukocytosis.  - Mg 2.1, K 3.8.  - WELLS score of 1.5.  - Will check blood cultures.  - Of note, patient was seen outpatient by cardiology on 8/12/2019.  At that time she was also noted to be complaining of palpitations, particularly around time of HD.

## 2019-09-03 NOTE — CARE UPDATE
5:00 PM    Seen and examined at bedside; agree with IGLESIA Garcia' assessment and plan. Nausea and vomiting appeared to have abated upon my evaluation and she was essentially back to baseline. Reported some malaise, which I suspect may be due to her overdue HD. Neprhology consulted and patient to dialyze this afternoon. Will reassess in am; continue current mgmt for now. BP better controlled with admin of AM meds.

## 2019-09-03 NOTE — HPI
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 The patient and the mother endorsed an intermittent history of palpitations. She also endorsed c/o SOB, abdominal, and N/V. She was recently seen in the ED on 8/22 with similar complaints. In the ED, labs were significant for BNP 1269, Hypocalcemia 7.0, /120s, SBP >160s, LA 0.8. She was last dialyze on Saturday without issues. She normally dialyzes q T/Th/Sat at [facility] under the direction of Dr. Bass. She states that she has be compliant with all of her outpatient medication regimen and dialysis treatments. The patient is being admitted to hospital medicine for concerns for an ileus. Abdominal x-ray revealing mildly distended gas-filled small bowel loops in the right lower quadrant.  Findings nonspecific and could relate to a localized ileus or partial small bowel obstruction. Pending CT abdomen/pelvis. Patient remains tachycardic despite 500 mL boluses x 2. Nephrology consulted for management of ESRD and HD treatment.

## 2019-09-03 NOTE — SUBJECTIVE & OBJECTIVE
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 ()    Moderate protein-calorie malnutrition 2017    MRSA bacteremia 2017    Pneumonia     Prophylactic immunotherapy 2014    Renovascular hypertension 10/2/2015    Secondary hyperparathyroidism 2017    Seizures     Sialadenitis 3/21/2018    Thrombocytopenia 2016       Past Surgical History:   Procedure Laterality Date    BIOPSY, LIVER, TRANSJUGULAR APPROACH N/A 2018    [facility]    BIOPSY-LIVER N/A 2017    [facility]    BIOPSY-LIVER N/A 3/22/2016    Performed by [facility] OR [address] [place] [address]     SECTION      x 2    CONIZATION-CERVICAL-LEEP N/A 6/15/2018    Performed by [facility]quin [facility] at [facility] OR    [id number]; upper extremity Left 2015    [facility]    CRANIOPLASTY  2019    [facility]    CRANIOTOMY-- left crani for clot evac with microscrope Left 2019    [facility]    EMBOLIZATION, BLOOD VESSEL N/A 2018    Performed by [facility]ren Ramos [facility] at [facility] CATH LAB    Exam Under Anesthesia N/A 2018    [facility]    Exam under  anesthesia N/A 6/19/2018    Performed by [facility] at [facility] OR    Exam under anesthesia (ADD ON ) N/A 7/9/2018    Performed by NICKIE Alvarez MD at [facility] OR    Exam under anesthesia -cervical suturing  N/A 7/26/2018    Performed by Lei Sims III, MD at [facility] OR    EXCISION, NEOPLASM, SKULL with Cranioplasty Left 4/22/2019    Performed by [facility] at [facility]    FISTULOGRAM Left 12/4/2015    Performed by Idalia Diaz [facility] at [facility] CATH LAB    LIVER BIOPSY      LIVER TRANSPLANT  09/1992    PARATHYROIDECTOMY, Minimally Invasive Bilateral Exploration Bilateral 3/27/2019    Performed by Ashley Guallpa MD at [facility] OR [facility]    SUTURE REPAIR,CERVIX  6/19/2018    Performed by [facility]am [facility]quin [facility] at [facility] OR    TUBAL LIGATION  2010       Review of patient's allergies indicates:   Allergen Reactions    Chloral hydrate Hallucinations     Other reaction(s): Hallucinations  Other reaction(s): Hives    Hydrocodone Other (See Comments)     Mental status changes    Tolerates oxycodone     Current Facility-Administered Medications   Medication Frequency    0.9%  NaCl infusion Once    acetaminophen tablet 650 mg Q4H PRN    albuterol-ipratropium 2.5 mg-0.5 mg/3 mL nebulizer solution 3 mL Q4H PRN    calcitRIOL capsule 1 mcg BID    cloNIDine tablet 0.3 mg BID    dextrose 10 % infusion PRN    dextrose 10 % infusion PRN    glucagon (human recombinant) injection 1 mg PRN    glucose chewable tablet 16 g PRN    glucose chewable tablet 24 g PRN    hydrALAZINE tablet 100 mg Q8H    irbesartan tablet 300 mg QAM    isosorbide mononitrate 24 hr tablet 30 mg Daily    lacosamide tablet 50 mg Q12H    levETIRAcetam tablet 500 mg BID    minoxidil tablet 10 mg Daily    morphine injection 1 mg Q4H PRN    morphine injection 2 mg Q4H PRN    ondansetron injection 4 mg Q8H PRN    pantoprazole EC tablet 40 mg Daily    promethazine (PHENERGAN) 6.25 mg in dextrose 5 % 50 mL IVPB  Q6H PRN    ramelteon tablet 8 mg Nightly PRN    sodium chloride 0.9% flush 10 mL PRN    sodium chloride 0.9% flush 5 mL PRN    sodium chloride tablet 2 g BID    tacrolimus capsule 5 mg BID    verapamil CR tablet 240 mg QHS     Current Outpatient Medications   Medication    bacitracin 500 unit/gram ointment    bisacodyl (DULCOLAX) 10 mg Supp    calcitRIOL (ROCALTROL) 0.5 MCG Cap    cloNIDine (CATAPRES) 0.3 MG tablet    epoetin anca-epbx (RETACRIT) 3,000 unit/mL injection    hydrALAZINE (APRESOLINE) 100 MG tablet    irbesartan (AVAPRO) 300 MG tablet    isosorbide mononitrate (IMDUR) 30 MG 24 hr tablet    lacosamide (VIMPAT) 50 mg Tab    levETIRAcetam (KEPPRA) 500 MG Tab    minoxidil (LONITEN) 10 MG Tab    ondansetron (ZOFRAN-ODT) 4 MG TbDL    pantoprazole (PROTONIX) 40 MG tablet    polyethylene glycol (GLYCOLAX) 17 gram PwPk    senna-docusate 8.6-50 mg (PERICOLACE) 8.6-50 mg per tablet    sevelamer HCl (RENAGEL) 800 MG Tab    sodium chloride 1 gram tablet    tacrolimus (PROGRAF) 5 MG Cap    verapamil (VERELAN) 240 MG C24P     Family History     Problem Relation (Age of Onset)    Alzheimer's disease Paternal Grandmother    Cancer Sister    Heart attack Maternal Uncle    Heart disease Paternal Grandmother    Hypertension Mother, Father, Paternal Grandmother    No Known Problems Brother, Brother, Sister, Sister        Tobacco Use    Smoking status: Never Smoker    Smokeless tobacco: Never Used   Substance and Sexual Activity    Alcohol use: No    Drug use: Never    Sexual activity: Never     Partners: Male     Review of Systems   Constitutional: Positive for appetite change. Negative for activity change, chills, fatigue, fever and unexpected weight change.   HENT: Negative for hearing loss, trouble swallowing and voice change.    Eyes: Negative for visual disturbance.   Respiratory: Positive for shortness of breath. Negative for cough, choking, chest tightness and wheezing.     Cardiovascular: Positive for palpitations. Negative for chest pain and leg swelling.   Gastrointestinal: Positive for abdominal pain, nausea and vomiting. Negative for abdominal distention, blood in stool, constipation and diarrhea.   Genitourinary: Positive for decreased urine volume. Negative for dysuria, flank pain, frequency, hematuria and urgency.   Musculoskeletal: Negative for arthralgias, back pain and myalgias.   Skin: Negative for color change and rash.   Neurological: Negative for dizziness, weakness, light-headedness, numbness and headaches.   Psychiatric/Behavioral: Negative for agitation, behavioral problems, confusion and decreased concentration.     Objective:     Vital Signs (Most Recent):  Temp: 98.4 °F (36.9 °C) (09/03/19 0700)  Pulse: (!) 120 (09/03/19 0955)  Resp: 20 (09/03/19 0955)  BP: (!) 159/97 (09/03/19 0955)  SpO2: 95 % (09/03/19 0955)  O2 Device (Oxygen Therapy): room air (09/03/19 0955) Vital Signs (24h Range):  Temp:  [98.4 °F (36.9 °C)] 98.4 °F (36.9 °C)  Pulse:  [110-128] 120  Resp:  [13-24] 20  SpO2:  [95 %-98 %] 95 %  BP: (108-191)/() 159/97     Weight: 51.7 kg (114 lb) (09/03/19 0700)  Body mass index is 18.97 kg/m².  Body surface area is 1.54 meters squared.    I/O last 3 completed shifts:  In: 1000 [IV Piggyback:1000]  Out: -     Physical Exam   Constitutional: She is oriented to person, place, and time. She appears well-developed and well-nourished. She is sleeping. She appears ill. No distress.   HENT:   Head: Normocephalic and atraumatic.   Eyes: Pupils are equal, round, and reactive to light.   Neck: Neck supple. Carotid bruit is not present. No thyromegaly present.   Cardiovascular: Normal rate and regular rhythm. Exam reveals no gallop.   No murmur heard.  Pulmonary/Chest: Effort normal and breath sounds normal. No respiratory distress. She has no wheezes.   Abdominal: Soft. Bowel sounds are normal. She exhibits no distension and no mass. There is no splenomegaly or  hepatomegaly. There is tenderness.   Musculoskeletal: Normal range of motion. She exhibits no edema or deformity.   Neurological: She is oriented to person, place, and time. No cranial nerve deficit or sensory deficit.   Skin: Skin is warm and dry. No rash noted.   Psychiatric: She has a normal mood and affect. Her behavior is normal.       Significant Labs:  CBC:   Recent Labs   Lab 09/03/19  0517   WBC 6.14   RBC 4.65   HGB 11.7*   HCT 37.2   *   MCV 80*   MCH 25.2*   MCHC 31.5*     CMP:   Recent Labs   Lab 09/03/19  0517   *   CALCIUM 6.9*   ALBUMIN 3.3*   PROT 7.8      K 4.1   CO2 19*      BUN 42*   CREATININE 8.5*   ALKPHOS 444*   ALT 27   AST 26   BILITOT 1.0

## 2019-09-03 NOTE — ED NOTES
Hourly rounding complete. Patient resting in stretcher and is in NAD at this time. Pt is awake alert and oriented x4. Pt denies pain at this time. Pt aware of POC. Family now at bedside. Bed low and locked, SR up x2, call bell in patient reach. Pt remains on continuous cardiac monitor, continuous pulse ox, and auto BP cuff. Pt voices no needs at this time. Pending medication due s/t waiting for medications from pharmacy.

## 2019-09-03 NOTE — PLAN OF CARE
Problem: Adult Inpatient Plan of Care  Goal: Plan of Care Review  Outcome: Ongoing (interventions implemented as appropriate)  Hemodialysis tx complete, 4L removed in a tx of 4 hours, tolerated well. Blood returned via left forearm fistula, 15g blunt needles removed x2, gauze and tape applied, pressure held for 5 minutes to each site, hemostasis achieved

## 2019-09-03 NOTE — MEDICAL/APP STUDENT
SUBJECTIVE:     Chief Complaint/Diagnosis: Palpitations    Ms. Patel, tasia 29 yo F , with PMHx liver transplant 2/ hemangioendothelioma (1992), d-CHF with 65% EF, ESRD on dialysis (, , Sat), HTN, pulmonary HTN, anemia, secondary hyperparathyroidism, seizures and depression. Patient reports palpitations which onset 12 hours ago while in bed at rest. She has frequent episodes of palpitations, but none this severe. Associated symptoms include SOB, abdominal pain, emesis x3. Patient had GI virus with vomiting x1 week and abdominal pain x1 day. Last dialysis was Saturday for 3 hours, no complications, patient is due for dialysis today. She denies Hx PE, DVT, recent surgery, trauma, immobilization, initiation of hormone therapy or active cancer. She denies fever, chills, nausea, chest pain, diarrhea, constipation. She denies smoking, use of tobacco products and alcohol use. She reports blood pressure of 130-140 systolic on home readings.    Review of patient's allergies indicates:   Allergen Reactions    Chloral hydrate Hallucinations     Other reaction(s): Hallucinations  Other reaction(s): Hives    Hydrocodone Other (See Comments)     Mental status changes    Tolerates oxycodone       Past Medical History:   Diagnosis Date    Anemia in ESRD (end-stage renal disease) 10/12/2015    dialysis tues, thursday, sat; access left arm    Chronic rejection of liver transplant 3/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    BIOPSY-LIVER N/A 2017    Performed by [facility]    BIOPSY-LIVER N/A 3/22/2016    Performed by [facility]     SECTION      x 2    CONIZATION-CERVICAL-LEEP N/A 6/15/2018    Performed by [facility] at [facility] OR    [id number]; upper extremity Left 2015    Performed by Idalia Diaz MD at [facility] OR [facility]    CRANIOPLASTY  2019    Performed by Jose Luis Powell MD at [facility]    CRANIOTOMY-- left crani for clot evac with microscrope Left 2019    Performed by Jose Luis Powell MD at [facility]    EMBOLIZATION, BLOOD VESSEL N/A 2018    Performed by Aren Ramos MD at [facility] CATH LAB    Exam Under Anesthesia N/A 2018    Performed by [facility]am [facility] MD at [facility] OR [facility]    Exam under anesthesia N/A 2018    Performed by [facility] at [facility] OR    Exam under anesthesia (ADD ON ) N/A 2018    Performed by NICKIE Alvarez MD at [facility] OR    Exam under anesthesia -cervical suturing  N/A 2018    Performed by Lei Sims III, MD at [facility] OR    EXCISION, NEOPLASM, SKULL with Cranioplasty Left 2019    Performed by [facility] MD at [facility]    FISTULOGRAM Left 2015    Performed by Idalia Diaz MD at [facility] CATH LAB    LIVER BIOPSY      LIVER TRANSPLANT  1992    PARATHYROIDECTOMY, Minimally Invasive Bilateral Exploration Bilateral 3/27/2019    Performed by Ashley Guallpa MD at [facility] OR [facility]    SUTURE REPAIR,CERVIX  2018    Performed by Neelam LINDSEY  MD [facility] at [facility] OR    TUBAL LIGATION  2010     Family History   Problem Relation Age of Onset    Hypertension Mother     Hypertension Father     Hypertension Paternal Grandmother     Alzheimer's disease Paternal Grandmother     Heart disease Paternal Grandmother     Cancer Sister     Heart attack Maternal Uncle     No Known Problems Brother     No Known Problems Brother     No Known Problems Sister     No Known Problems Sister     Melanoma Neg Hx     Breast cancer Neg Hx     Colon cancer Neg Hx     Ovarian cancer Neg Hx     Kidney disease Neg Hx     Stroke Neg Hx      Social History     Tobacco Use    Smoking status: Never Smoker    Smokeless tobacco: Never Used   Substance Use Topics    Alcohol use: No    Drug use: Never        Review of Systems:  Constitutional: no fever or chills, negative for chills and fevers  Eyes: no visual changes  ENT: no nasal congestion or sore throat  Respiratory: positive for SOB, negative for cough  Cardiovascular: positive for dyspnea and palpitations, negative for chest pain and syncope  Gastrointestinal: positive for abdominal pain and vomiting, negative for change in bowel habits, constipation, diarrhea and nausea  Genitourinary: no hematuria or dysuria, (pt is anuric)  Integument/Breast: no rash or pruritis  Hematologic/Lymphatic: no easy bruising or lymphadenopathy  Musculoskeletal: no arthralgias or myalgias  Neurological: no seizures or tremors  Behavioral/Psych: no auditory or visual hallucinations  Endocrine: no heat or cold intolerance    OBJECTIVE:     Vital Signs (Most Recent)  Temp: 98.4 °F (36.9 °C) (09/03/19 0700)  Pulse: (!) 120 (09/03/19 0700)  Resp: 16 (09/03/19 0700)  BP: (!) 191/112 (09/03/19 0745)  SpO2: 97 % (09/03/19 0700)    Physical Exam:  General: well developed, well nourished, no distress  Head: normocephalic, recent craniotomy, unable to examine due to wig in place  Eyes:  positive findings: sclera mildly icteric  Nose: Nares  normal. Septum midline. Mucosa normal. No drainage or sinus tenderness., no discharge, no sinus tenderness  Throat: lips, mucosa, gums and tongue normal. Poor dentition.  Neck: supple, symmetrical, trachea midline and thyroid not enlarged, symmetric, no tenderness/mass/nodules  Lungs:  clear to auscultation bilaterally, normal respiratory effort and normal percussion bilaterally  Chest Wall: no tenderness  Breasts:  deferred  Heart: regular rate and rhythm, S1, S2 normal, no murmur, rub or gallop and tachycardic, systolic murmur heard  Abdomen: normal findings: bowel sounds normal, no masses palpable, no renal abnormalities palpable and no scars, striae, dilated veins, rashes, or lesions and abnormal findings:  generalized, diffuse abdominal pain, left flank pain  Pelvic:  deferred  Rectal: normal tone, no masses or tenderness and not examined  Extremities: warm, well perfused, no cyanosis or edema, or clubbing, Marina's sign is negative bilaterally, no sign of DVT, no edema, redness or tenderness in the calves or thighs and AV fistula at left forearm  Pulses: 2+ and symmetric  tachycardic  Skin: Skin color, texture, turgor normal. No rashes or lesions  Lymph Nodes: cervical and supraclavicular nodes normal  Neurologic: Alert and oriented. Thought content appropriate     Recent Labs   Lab 09/03/19  0517   WBC 6.14   RBC 4.65   HGB 11.7*   HCT 37.2   *   MCV 80*   MCH 25.2*   MCHC 31.5*     Recent Labs   Lab 09/03/19  0517   CALCIUM 6.9*   PROT 7.8      K 4.1   CO2 19*      BUN 42*   CREATININE 8.5*   ALKPHOS 444*   ALT 27   AST 26   BILITOT 1.0       Assessment:    Tachycardia   - Patient received NS 500cc bolus x2 in ED without improvement in tachycardia   - ECG shows sinus tachycardia with PVCs   - Continue telemetry monitoring    Emesis  Abdominal Pain   - Patient received and vomited GI cocktail in ED. Minimal improvement with IV Zofran   - Abd X-ray showed mildly distended gas-filled small  bowel loops in the RLQ   - Keep NPO   - Order CT abdomen/pelvis   - Supportive care, anti-emetics    ESRD on hemodialysis   - On T, Th, Sat dialysis   - Due for dialysis today    - Received 1L NS in ED   - Consult nephrology    Chronic diastolic heart failure   - Stress ECHO in October 2018 showed EF 60-65% with grade 2 diastolic dysfunction and PAP 40mmHg   - Check BNP   - Repeat ECHO    Seizures   - Continue Keppra 500mg BID and Vimpat 50mg BID   - Check levels   - Seizure precautions    Renovascular hypertension   - Continue home clonidine 0.3mg BID, hydralazine 100mg TID, irbesartan 300mg daily, isosorbide mononitrate 30mg daily, minoxidil 10mg daily, verapamil 240mg qHS    Liver replaced by transplant  Immunosuppressed   - Continue Prograf 5mg BID   - Monitor level   - Monitor LFTs      VTE Risk Mitigation (From admission, onward)        Ordered     Place sequential compression device  Until discontinued      09/03/19 0503     Place LINDA hose  Until discontinued      09/03/19 0503     IP VTE LOW RISK PATIENT  Once      09/03/19 0503        James Jaramillo MS3  Department of Hospital Medicine  [facility]

## 2019-09-04 NOTE — PROGRESS NOTES
HD treatment completed on yesterday, Net UF 4 L. Patient noted to be ~ 9 kg over her dry weight on yesterday, but refusing additional treatment today for volume removal. Noted to be on room air this AM, no distress noted. No signs of volume overload. Electrolytes and acid/base stable. BP stable. No clinical signs of uremia.     Will plan for HD tomorrow as plan per OP schedule for metabolic clearance and volume management.  Will order repeat iCA, Calcitriol dose adjusted on yesterday per OP Nephrologist.     TED Russell DNP, APRN, FNP-C  Department of Nephrology  [facility]  Pager: [phone]

## 2019-09-04 NOTE — PLAN OF CARE
Problem: Adult Inpatient Plan of Care  Goal: Plan of Care Review  Outcome: Ongoing (interventions implemented as appropriate)  Pt remains free from falls and injury. Pt stated she was SOB shortly after arriving to floor, Respiratory provided breathing treatment, Pt states able to breath better. Pt makes statement of no pain. Bed low and locked, Call light within reach.

## 2019-09-04 NOTE — HPI
Ms Patel is a 28 year old female with history of OLT (1992, follows with Dr. 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    ons while at HD.     Home prograf dose 5/5. Baseline trough ~4. On admission LFTs at baseline, albumin 3.3.

## 2019-09-06 NOTE — HOSPITAL COURSE
Patient was admitted to [facility]. She underwent HD w/ symptomatic improvement. On discharge, she was able to tolerate oral diet and had not had any repeat emesis. Discharged with instructions to attend regularly scheduled HD at her facility and f/u with her PCP.     For details on mgmt of IP problems, see below:   Intractable vomiting with nausea  Abdominal Pain  - Patient received and vomited GI cocktail in the ED.  Minimal improvement with IV Zofran.  - X-ray abdomen showed mildly distended gas-filled small bowel loops in the right lower quadrant.  Findings nonspecific and could relate to a localized ileus or partial small bowel obstruction.  - CT abdomen/pelvis showed nonspecific wall thickening involving portions of the colon and small bowel, possibly relating to portal colopathy/enteropathy although correlation for symptoms of underlying enterocolitis of infectious or inflammatory etiology was advised.  - Favored to represent portal colopathy given demonstrated ascites, h/o liver transplant/cirrhosis. Abdominal pain resolved with supportive care, anti-emetics (no antibiotics). Denied diarrhea, fever.  - Home bowel regimen:  Dulcolax 10mg suppository daily, Glycolax 17g BID, Pericolace daily.      Tachycardia  - Patient received NS 500cc bolus x2 in ED without improvement in tachycardia.  - ECG shows sinus tach with PVCs.  Monitored on telemetry.  - Patient is afebrile with no evidence of leukocytosis.  - Mg 2.1, K 3.8.  - WELLS score of 1.5.  - Blood cultures NG  - Of note, patient was seen outpatient by cardiology on 8/12/2019.  At that time she was also noted to be complaining of palpitations, particularly around time of HD.  -Sinus tachycardia improved on day of discharge, following HD. Asymptomatic and HDS on discharge.      ESRD on hemodialysis  Anemia in ESRD (end-stage renal disease)  Chronic kidney disease-mineral and bone disorder  Secondary hyperparathyroidism  - Patient on [facility] HD.  Does not  make urine.  - Received 1L NS in ED.  - Consulted nephrology.  - H/H 11.1/34.6.  - Continue calcitriol 1mcg BID, sevelamer carbonate 800mg TID.  - bony lesions of L5 in S2 concerning for brown tumor seen on CT. Discussed with NSGY resident; no need for urgent eval in absence of cord compression/symptoms. Recommended f/u in clinic. Referral placed.      Chronic diastolic heart failure  - Stress echo in October 2018 showed EF 60-65% with grade 2 diastolic dysfunction and PAP 40mmHg.  - Oxygenating adequately on RA.   - Nephrology consulted; volume removal via HD     Seizures  - Continue Keppra 500mg BID and Vimpat 50mg BID.  - Seizure precautions.     Renovascular hypertension  - Continue home clonidine 0.3mg BID, hydralazine 100mg TID, irbesartan 300mg daily, isosorbide mononitrate 30mg daily, minoxidil 10mg daily, verapamil 240mg qHS.     Liver replaced by transplant  Immunosuppressed  - Continue Prograf 5mg BID.  - LFTs stable and at baseline.

## 2019-09-09 NOTE — DISCHARGE SUMMARY
[facility] Hospital Medicine  Discharge Summary      Patient Name: Holly Patel  MRN: [id number]  Admission Date: 9/2/2019  Hospital Length of Stay: 0 days  Discharge Date and Time: 9/4/2019 11:01 AM  Attending Physician: No att. providers found   Discharging Provider: Savanah Pickens MD  Primary Care Provider: Stan Sosa MD  [facility] Medicine Team: [facility] HOSP MED A Savanah Pickens MD    HPI:   Patient is a 28 year old lady with a h/o of liver transplant (9/1992), chronic diastolic HF, HTN, pulmonary HTN, ESRD on HD, anemia, secondary parathyroidism, seizures.  She presents with palpitations.  Patient states that palpitations began last night while at rest.  She notes associated SOB, but denies dizziness and chest pain.  SOB is worse when she lies flat, denies worsening with exertion.  Denies BLE edema, weight gain.  She does report worsening abdominal pain, N/V (last episode in ED).  She denies diarrhea, constipation.  She is on HD TuThSa.  She completed HD successfully on Saturday.  She does not make urine.  She denies h/o PE/DVT, recent immobilization, extended travel, recent surgery, active cancer, fever/chills.  She does not smoke.    Of note, patient was seen in the ED with similar complaints of tachycardia, N/V, and abdominal pain on 8/22/2019.  Symptoms improved following IVFs.  HR improved from 120s to 110s.      * No surgery found *      Hospital Course:   Patient was admitted to [facility] Med/OBS. She underwent HD w/ symptomatic improvement. On discharge, she was able to tolerate oral diet and had not had any repeat emesis. Discharged with instructions to attend regularly scheduled HD at her facility and f/u with her PCP.     For details on mgmt of IP problems, see below:   Intractable vomiting with nausea  Abdominal Pain  - Patient received and vomited GI cocktail in the ED.  Minimal improvement with IV Zofran.  - X-ray abdomen showed mildly distended gas-filled small  bowel loops in the right lower quadrant.  Findings nonspecific and could relate to a localized ileus or partial small bowel obstruction.  - CT abdomen/pelvis showed nonspecific wall thickening involving portions of the colon and small bowel, possibly relating to portal colopathy/enteropathy although correlation for symptoms of underlying enterocolitis of infectious or inflammatory etiology was advised.  - Favored to represent portal colopathy given demonstrated ascites, h/o liver transplant/cirrhosis. Abdominal pain resolved with supportive care, anti-emetics (no antibiotics). Denied diarrhea, fever.  - Home bowel regimen:  Dulcolax 10mg suppository daily, Glycolax 17g BID, Pericolace daily.      Tachycardia  - Patient received NS 500cc bolus x2 in ED without improvement in tachycardia.  - ECG shows sinus tach with PVCs.   Monitored on telemetry.  - Patient is afebrile with no evidence of leukocytosis.  - Mg 2.1, K 3.8.  - WELLS score of 1.5.  - Blood cultures NG  - Of note, patient was seen outpatient by cardiology on 8/12/2019.  At that time she was also noted to be complaining of palpitations, particularly around time of HD.  -Sinus tachycardia improved on day of discharge, following HD. Asymptomatic and HDS on discharge.      ESRD on hemodialysis  Anemia in ESRD (end-stage renal disease)  Chronic kidney disease-mineral and bone disorder  Secondary hyperparathyroidism  - Patient on TuThSa HD.  Does not make urine.  - Received 1L NS in ED.  - Consulted nephrology.  - H/H 11.1/34.6.  - Continue calcitriol 1mcg BID, sevelamer carbonate 800mg TID.  - bony lesions of L5 in S2 concerning for brown tumor seen on CT. Discussed with NSGY resident; no need for urgent eval in absence of cord compression/symptoms. Recommended f/u in clinic. Referral placed.      Chronic diastolic heart failure  - Stress echo in October 2018 showed EF 60-65% with grade 2 diastolic dysfunction and PAP 40mmHg.  - Oxygenating adequately on RA.    - Nephrology consulted; volume removal via HD     Seizures  - Continue Keppra 500mg BID and Vimpat 50mg BID.  - Seizure precautions.     Renovascular hypertension  - Continue home clonidine 0.3mg BID, hydralazine 100mg TID, irbesartan 300mg daily, isosorbide mononitrate 30mg daily, minoxidil 10mg daily, verapamil 240mg qHS.     Liver replaced by transplant  Immunosuppressed  - Continue Prograf 5mg BID.  - LFTs stable and at baseline.     Consults:   Consults (From admission, onward)        Status Ordering Provider     Inpatient consult to Nephrology  Once     Provider:  (Not yet assigned)    SKIP Moon          No new Assessment & Plan notes have been filed under this hospital service since the last note was generated.  Service: Hospital Medicine    Final Active Diagnoses:    Diagnosis Date Noted POA    PRINCIPAL PROBLEM:  Intractable vomiting with nausea [R11.2] 09/03/2019 Yes    Tachycardia [R00.0] 09/03/2019 Yes    Chronic diastolic heart failure [I50.32] 09/03/2019 Yes     Chronic    Hypocalcemia [E83.51] 03/28/2019 Yes    Seizures [R56.9]  Yes     Chronic    Renovascular hypertension [I15.0] 10/02/2015 Yes     Chronic    ESRD on hemodialysis [N18.6, Z99.2] 09/30/2015 Not Applicable     Chronic    Liver replaced by transplant [Z94.4] 09/10/2012 Not Applicable     Chronic      Problems Resolved During this Admission:       Discharged Condition: stable    Disposition: Home or Self Care    Follow Up:  Follow-up Information     Stan Sosa MD In 1 week.    Specialty:  Internal Medicine  Contact information:  [address]  [place] [id number] [phone]                 Patient Instructions:   No discharge procedures on file.    Significant Diagnostic Studies: See MR    Pending Diagnostic Studies:     Procedure Component Value Units Date/Time    Echo Color Flow Doppler? Yes [[id number]]     Order Status:  Sent Lab Status:  No result          Medications:  Reconciled Home  Medications:      Medication List      START taking these medications    albuterol 90 mcg/actuation inhaler  Commonly known as:  PROVENTIL/VENTOLIN HFA  Inhale 2 puffs into the lungs every 6 (six) hours as needed for Wheezing.        CONTINUE taking these medications    bacitracin 500 unit/gram ointment  Apply topically as needed.     bisacodyl 10 mg Supp  Commonly known as:  DULCOLAX  Place 1 suppository (10 mg total) rectally once daily.     calcitRIOL 0.5 MCG Cap  Commonly known as:  ROCALTROL  Take 2 capsules (1 mcg total) by mouth 2 (two) times daily.     cloNIDine 0.3 MG tablet  Commonly known as:  CATAPRES  TAKE 1 TABLET BY MOUTH TWICE A DAY     epoetin anca-epbx 3,000 unit/mL injection  Commonly known as:  RETACRIT  Inject 1 mL (3,000 Units total) into the skin every Mon, Wed, Fri.     hydrALAZINE 100 MG tablet  Commonly known as:  APRESOLINE  Take 1 tablet (100 mg total) by mouth every 8 (eight) hours.     irbesartan 300 MG tablet  Commonly known as:  AVAPRO  Take 1 tablet (300 mg total) by mouth every morning.     isosorbide mononitrate 30 MG 24 hr tablet  Commonly known as:  IMDUR  Take 1 tablet (30 mg total) by mouth once daily.     lacosamide 50 mg Tab  Commonly known as:  VIMPAT  Take 1 tablet (50 mg total) by mouth every 12 (twelve) hours.     levETIRAcetam 500 MG Tab  Commonly known as:  KEPPRA  Take 1 tablet (500 mg total) by mouth 2 (two) times daily.     minoxidil 10 MG Tab  Commonly known as:  LONITEN  Take 1 tablet (10 mg total) by mouth once daily.     ondansetron 4 MG Tbdl  Commonly known as:  ZOFRAN-ODT  Take 1 tablet (4 mg total) by mouth every 6 (six) hours as needed.     pantoprazole 40 MG tablet  Commonly known as:  PROTONIX  Take 1 tablet (40 mg total) by mouth once daily.     polyethylene glycol 17 gram Pwpk  Commonly known as:  GLYCOLAX  Take 17 g by mouth 2 (two) times daily.     senna-docusate 8.6-50 mg 8.6-50 mg per tablet  Commonly known as:  PERICOLACE  Take 2 tablets by mouth  once daily.     sevelamer HCl 800 MG Tab  Commonly known as:  RENAGEL  Take 800 mg by mouth 3 (three) times daily with meals.     sodium chloride 1 gram tablet  Take 2 g by mouth 2 (two) times daily.     tacrolimus 5 MG Cap  Commonly known as:  PROGRAF  Take 1 capsule (5 mg total) by mouth every 12 (twelve) hours.     verapamil 240 MG C24p  Commonly known as:  VERELAN  Take 1 capsule (240 mg total) by mouth every evening.            Indwelling Lines/Drains at time of discharge:   Lines/Drains/Airways     Drain                 Hemodialysis AV Fistula Left forearm -- days                Time spent on the discharge of patient: 35 minutes  Patient was seen and examined on the date of discharge and determined to be suitable for discharge.         Savanah Pickens MD  Department of Hospital Medicine  [facility]

## 2019-09-12 NOTE — TELEPHONE ENCOUNTER
----- Message from Christine Coello sent at 9/12/2019  8:38 AM CDT -----  Contact: Justo agudelo/[facility] [phone]  Calling to inform that a tube of blood was not drawn on the pt    CMP was not completed during draw for pt    pls contact Ty to discuss

## 2019-09-13 ENCOUNTER — POST MORTEM DOCUMENTATION (OUTPATIENT)
Dept: TRANSPLANT | Facility: CLINIC | Age: 29
End: 2019-09-13

## 2019-09-13 RX ORDER — TACROLIMUS 5 MG/1
5 CAPSULE ORAL EVERY 12 HOURS
Qty: 60 CAPSULE | Refills: 11 | Status: SHIPPED | OUTPATIENT
Start: 2019-09-13 | End: 2020-09-12

## 2019-09-13 NOTE — PROGRESS NOTES
"I received a call from the pulmonary department that pts mother was on the telephone and that pt had . I took the call from the mom who stated that she and Holly were at [facility] last night when Holly stated she was not feeling well and wanted to go home. The mother states they were leaving when Holly vomited so she took her to her sisters house which was closer. She states "Holly laid down on the couch and just ." They call 911 and she was transported to [facility]. The mom states she wanted to come to [facility] however she had to go to [facility] because she was not breathing. They were unable to resuscitate her. Today is her birthday and her mother states Holly was very excited about that with all the plans they had for today. I expressed my condolences and advised that I would make her Liver coordinator aware.  "

## 2019-09-17 ENCOUNTER — TELEPHONE (OUTPATIENT)
Dept: HOME HEALTH SERVICES | Facility: HOSPITAL | Age: 29
End: 2019-09-17

## 2019-09-18 NOTE — SUBJECTIVE & OBJECTIVE
[facility] [place] [facility]ARDIOLOGY OFFICE FOLLOW UP NOTE       Date of Encounter: 09/17/19   YOB: 1966   MRN: [id number]   Primary Care Provider: Simran Luna MD     Chief Complaint   Patient presents with   • Pre-Op Exam     cardiovascular surgery   • Follow-up     3 month follow up          HISTORY OF PRESENT ILLNESS         Eric Orozco was seen at [facility] on 09/17/19.      He is a very pleasant 53 year old male followed by Dr. Villafana.  Previously with Dr Neil.   Last seen in May 2019 by myself.     History is significant for known CAD, ischemic cardiomyopathy, peripheral arterial disease, HTN, HLP, DM, former tobacco use, COPD, FRANCISCO with refusal of CPAP, CKD stage II.     Prior stenting of mLAD with TRACEY in 2010 at [facility] following an abnormal stress test.  EF was 50-55% without regional wma or significant valve disease.     In September 2018 he presented with heart failure symptoms.  Echo at the time showed EF down to 35% with RWMA.  Placed on optimal medical therapy.  Cath in October showed severe multivessel CAD.  He was referred for CABG.  This was delayed for optimization of diabetic control and pulmonary function.   Surgery completed on 12/26/18 consisting of LIMA-LAD, vg-diag-OM sequence.   Native RCA known to be occluded and filled via collaterals.       Post op course significant for recurrent pleural effusions requiring several thoracenteses. Most recent on 3/14.  Last CXR in on 4/5 showed only small effusions bilaterally.  In addition had left upper extremity DVT requiring course of treatment with Eliquis, now discontinued.      On most recent echo on 6/2019- there was interval improvement of EF to 43% (up from 35% on initial post op echo).  Grade II diastolic dysfunction present.   No significant valve disease.     In terms of peripheral vascular disease he had surgery for revascularization of ischemic right had at  in early March.       Has since had cerebral  Interval History: No acute events. Blood pressure well controlled with addition of clonidine. Off calcium gtt since 0730 yesterday. Maintaining iCa >0.9.    Medications:  Continuous Infusions:    Scheduled Meds:   sodium chloride 0.9%   Intravenous Once    albuterol sulfate  10 mg Nebulization Once    calcitRIOL  1 mcg Oral BID    calcium carbonate  2,000 mg Oral TID WM    carvedilol  25 mg Oral BID    cloNIDine  0.1 mg Oral BID    dextrose 50%  25 g Intravenous Once    And    insulin regular  10 Units Intravenous Once    famotidine  40 mg Oral QAM    hydrALAZINE  100 mg Oral Q8H    irbesartan  300 mg Oral QAM    lacosamide  50 mg Oral BID    levETIRAcetam  500 mg Oral BID    senna-docusate 8.6-50 mg  1 tablet Oral BID    tacrolimus  5 mg Oral Daily    tacrolimus  6 mg Oral Daily    verapamil  240 mg Oral QHS     PRN Meds:sodium chloride, acetaminophen, acetaminophen-codeine 300-30mg, dextrose 50% **AND** dextrose 50% **AND** insulin regular, diphenhydrAMINE, labetalol, ondansetron, promethazine, ramelteon, sodium chloride 0.9%     Review of patient's allergies indicates:   Allergen Reactions    Chloral hydrate Hallucinations     Other reaction(s): Hallucinations  Other reaction(s): Hives    Hydrocodone Other (See Comments)     Mental status changes     Objective:     Vital Signs (Most Recent):  Temp: 97.6 °F (36.4 °C) (04/02/19 0707)  Pulse: 81 (04/02/19 0707)  Resp: 18 (04/02/19 0707)  BP: 99/66 (04/02/19 0707)  SpO2: 99 % (04/02/19 0707) Vital Signs (24h Range):  Temp:  [96.1 °F (35.6 °C)-98.2 °F (36.8 °C)] 97.6 °F (36.4 °C)  Pulse:  [76-85] 81  Resp:  [17-18] 18  SpO2:  [97 %-100 %] 99 %  BP: ()/() 99/66     Weight: 52.2 kg (115 lb 1.3 oz)  Body mass index is 19.15 kg/m².    Intake/Output - Last 3 Shifts       03/31 0700 - 04/01 0659 04/01 0700 - 04/02 0659 04/02 0700 - 04/03 0659    P.O. 700      Other       Total Intake(mL/kg) 700 (13.4)      Other       Total Output       Net  +700             Urine Occurrence 1 x            Physical Exam    NAD, resting well  Non labored respirations  Neck incision cdi, soft, non ttp  Voice strong  Extremities wwp    Significant Labs:  BMP:   Recent Labs   Lab 04/02/19  0520         K 5.8*      CO2 22*   BUN 39*   CREATININE 8.5*   CALCIUM 8.2*   MG 2.3     CMP:   Recent Labs   Lab 04/02/19  0520      CALCIUM 8.2*   ALBUMIN 2.7*   PROT 7.3      K 5.8*   CO2 22*      BUN 39*   CREATININE 8.5*   ALKPHOS 1,050*   ALT 15   AST 45*   BILITOT 0.7      angiogram performed by Dr Bhatt on 5/9 after incidental finding of anterior communicating artery aneurysm.  Bilobed anterior communicating artery aneurysm that measures 5.9 mm in greatest dimension with the daughter aneurysm measuring 3.3 mm, non-flow limiting carotid stenosis, severe left common femoral artery stenosis.    Due to the finding of femoral artery stenosis with significant claudication symptoms he was referred to vascular surgery.  He was seen by Dr Jane.  Workup revealed significant disease with plan for bilateral femoral endarterectomies and aortoiliac stents.  This is scheduled for October 8th.    Last lipid assessment: 9/17/18.   CHOLESTEROL: 126  CALCULATED LDL: 66  HDL: 34  TRIGLYCERIDE: 130.    Currently taking rosuvastatin 20 mg daily    TODAY'S DISCUSSION:  Presents today for 3 follow up.   Being seen for preoperative evaluation prior to vascular surgery.    Patient reports severe lifestyle limiting bilateral claudication.  Only able to walk approximately 50 feet or 6 steps before he needs to stop due to leg pain.   No rest pain or lower extremity ulceration.   With this minimal amount of activity he denies worsening shortness of breath.   Denies chest pain.   No edema, orthopnea, or PND.  Denies palpitations, lightheadedness, or near syncope.  Denies melena or hematochezia.     Complains of frequent cramping in his hands.    He remains compliant with his meds aside from his inhalers.  He did briefly resume smoking but has since quit again.    Weight is 92.9 kg.  Previous weight 96.9 kg.    I have reviewed the patient's past medical history, surgical history, family history, social history, allergies, and current medications in the electronic medical record. I verify that they are complete and accurate.       SOCIAL HISTORY     Social History     Tobacco Use   Smoking Status Former Smoker   • Packs/day: 1.00   • Years: 34.00   • Pack years: 34.00   • Types: Cigarettes   • Start date: 6/5/1978    • Last attempt to quit: 2018   • Years since quittin.7   Smokeless Tobacco Never Used          ALLERGIES     ALLERGIES:   Allergen Reactions   • Niacin SHORTNESS OF BREATH   • Adhesive   (Environmental) RASH     Tape adhesive; paper tape is ok          CURRENT MEDICATIONS     Current Outpatient Medications   Medication Sig Dispense Refill   • metformin (GLUCOPHAGE) 1000 MG tablet Take 1 tablet by mouth twice daily after meals. 60 tablet 0   • torsemide (DEMADEX) 20 MG tablet Take 1 tablet by mouth daily. 30 tablet 5   • predniSONE (DELTASONE) 10 MG tablet Take 1 tablet by mouth daily. 30 tablet 2   • budesonide-formoterol (SYMBICORT) 80-4.5 MCG/ACT inhaler Inhale 2 puffs into the lungs 2 times daily. 10.2 g 5   • tiotropium (SPIRIVA RESPIMAT) 2.5 MCG/ACT inhaler Inhale 2 puffs into the lungs daily. 4 g 1   • glimepiride (AMARYL) 4 MG tablet Take 1 tablet by mouth daily (before breakfast). 90 tablet 1   • bisoprolol (ZEBETA) 10 MG tablet Take 1 tablet by mouth 2 times daily. 180 tablet 4   • rosuvastatin (CRESTOR) 20 MG tablet Take 1 tablet by mouth daily. 90 tablet 4   • insulin glargine (LANTUS SOLOSTAR) 100 UNIT/ML pen-injector Inject 50 Units into the skin nightly. 15 mL 2   • oxyCODONE-acetaminophen (PERCOCET) 5-325 MG per tablet Take 1 tablet by mouth every 8 hours as needed for Pain (Not to exceed 4000 mg acetaminophen per day). 40 tablet 0   • sennosides-docusate sodium (SENOKOT-S) 8.6-50 MG tablet Take 1 tablet by mouth daily. Please take one tablet each day that you also take a narcotic pill to help prevent constipation. 40 tablet 0   • albuterol (VENTOLIN HFA) 108 (90 Base) MCG/ACT inhaler Inhale 2 puffs into the lungs every 4 hours as needed for Shortness of Breath. 8.5 g 5   • blood glucose meter Test blood sugar two times daily as directed. Diagnosis: type 2 uncontrolled. Meter: pharmacist choice 1 kit 0   • blood glucose test strip Test blood sugar two times daily as directed. 200 each 0    • blood glucose lancets Test blood sugar two times daily as directed. Diagnosis: type 2 uncontrolled. Meter: pharmacist choice 200 each 0   • docusate sodium-sennosides (SENOKOT S) 50-8.6 MG per tablet Take 1-2 tablets by mouth 2 times daily as needed for Constipation. 20 tablet 0   • Multiple Vitamins-Minerals (VITAMIN - THERAPEUTIC MULTIVITAMINS W/MINERALS) Tab Take 1 tablet by mouth daily. 100 tablet 0   • lisinopril (ZESTRIL) 5 MG tablet Take 0.5 tablets by mouth daily. 30 tablet 5   • aspirin 81 MG tablet Take 81 mg by mouth daily.     • albuterol-ipratropium 2.5 mg/0.5 mg (DUONEB) 0.5-2.5 (3) MG/3ML nebulizer solution Take 3 mLs by nebulization every 6 hours as needed for Wheezing or Shortness of Breath. 360 mL 2   • Insulin Pen Needle (ALFONSO PEN NEEDLES) 31G X 8 MM Misc to use with insulin pen 100 each 3   • predniSONE (DELTASONE) 20 MG tablet Take 2 tablets by mouth daily for 5 days. 10 tablet 0   • lisinopril (ZESTRIL) 20 MG tablet Take 1 tablet by mouth 2 times daily. Increased dose. 60 tablet 0     No current facility-administered medications for this visit.           REVIEW OF SYSTEMS       REVIEW OF SYSTEMS:  Constitutional: Negative  Integumentary problem(s):rash  HEENT Problem(s): Negative  Cardiovascular problem(s): Shortness of Breath on Exertion and Pain in Calves When Walking  Respiratory problem(s): shortness of breath, COPD  Gastro-intestinal problem(s): none  Genito-urinary problem(s): Negative  Musculoskeletal problem(s): Muscle or Joint Pain  Neurological problem(s): Numbness or Tingling in Hands or Feet and Neuropathy  Psychiatric problem(s): Depression  Endocrine problem(s): Diabetes  Hematologic and/or Lymphatic problem(s): Negative     Patient does not smoke.     Patient does take aspirin.         Physical Exam     Visit Vitals  /86   Pulse 81   Ht 5' 7\" (1.702 m)   Wt 93 kg   SpO2 91%   BMI 32.11 kg/m²       Wt Readings from Last 4 Encounters:   09/17/19 93 kg   08/22/19 96.3 kg    05/21/19 96.9 kg   05/09/19 97.1 kg       Constitutional:  Pleasant male in no acute distress.  Skin: various skin lesions on arms and neck with scabs and scars.    HEENT:  Normocephalic. Atraumatic.  Sclerae anicteric.  Mucous membranes moist.  Neck: Supple.  No JVD.  Carotids 2+ without bruits.  Lungs:   Clear on auscultation.  No wheezes, rhonchi, or rales.  Heart: : Regular rate and rhythm.  Normal S1, S2.  No murmur gallop or rub   Abdomen:  Normal bowel sounds.  Soft, non-tender.  No abdominal bruits.  Extremities:  no edema.  Absent lower extremity pulses  Neurologic:  Alert & oriented x 3. Normal affect and mood.  No gross motor deficits.  Back:   Mild kyphosis      LABS      Lab Results   Component Value Date    POTASSIUM 5.0 05/09/2019    SODIUM 135 05/09/2019    CO2 29 05/09/2019    CHLORIDE 100 05/09/2019    BUN 21 (H) 05/09/2019    CREATININE 1.41 (H) 07/26/2019    HGBA1C 7.0 (H) 03/22/2019    GLUCOSE 382 (H) 05/09/2019    WBC 8.3 05/09/2019    RBC 4.60 05/09/2019    HCT 41.9 05/09/2019    HGB 13.6 05/09/2019     05/09/2019    TSH 3.779 10/02/2018    CHOLESTEROL 126 09/17/2018    HDL 34 (L) 09/17/2018    CHOHDL 3.7 09/17/2018    TRIGLYCERIDE 130 09/17/2018    CALCLDL 66 09/17/2018    INR 1.0 05/09/2019         IMAGING         As noted above    ECG INTERPRETATION - 9/17/2019  NSR with nonspecific ST abnormality.  HR 85, , QTc 480 ms.   Compared to ECG on 5/9/19 previously reported inferior and lateral T wave inversions no longer present.        ASSESSMENT AND PLAN     IMPRESSIONS  1.  CAD, prior TRACEY mLAD in 2010. 3 vessel CABG 12/2018- LIMA-LAD, vg-diag-OM sequence.   RCA filled via collaterals  2.  Ischemic cardiomyopathy, EF 43%  3.  post operative pleural effusion requiring thoracentesis  4.  Post op left upper extremity DVT, completed 3 months of elquis  5.  Former tobacco use  6.  COPD  7.   FRANCISCO with refusal of CPAP  8.  HTN, borderline control  9.  Dyslipidemia  10.  Diabetes  mellitus  11.  CKD, stage II  12.  radial artery stenosis s/p intervention as noted above  13.  Cerebral aneurysm  14.  Peripheral arterial disease  15. Hep C  16.  Hx of alcohol abuse- denies significant use currently    Patient is being seen for preoperative assessment.  Activity is severely limited by lifestyle limiting claudication.   Therefore unable to adequately assess functional capacity.   As he is scheduled for high risk vascular surgery feel that stress test is indicated for risk stratification.   Lexiscan has been ordered.   With known  occlusion of RCA inferior defect will likely be present.   Need to exclude presence of anterior ischemia.       If no significant ischemia present he will be cleared for surgery without further cardiac testing.  Continue perioperative BB/statin/ASA.    Continue to abstain from tobacco.    Follow up in 3 months or sooner if necessary.  Reviewed signs/symptoms to monitor and report.  Encouraged to call with questions or concerns.

## 2019-09-21 DIAGNOSIS — I10 HYPERTENSION, UNSPECIFIED TYPE: ICD-10-CM

## 2019-09-21 RX ORDER — IRBESARTAN 300 MG/1
TABLET ORAL
Qty: 90 TABLET | Refills: 1 | OUTPATIENT
Start: 2019-09-21

## 2019-10-15 NOTE — ASSESSMENT & PLAN NOTE
- per piror notes these have occurred with prior HTN emergencies   - continue outpatient seizure meds        PA note    patient seen & examined for continuation of management. comfortable with epidural    VS  T(C): 37.2 (10-15-19 @ 13:24)  HR: 56 (10-15-19 @ 15:31)  BP: 109/65 (10-15-19 @ 15:31)  RR: 20 (10-15-19 @ 10:15)  SpO2: 93% (10-15-19 @ 15:36)    130/mod pooja/+accels/occasional late decels   toco q 2-6min  VE 3.5/80/-2 intact    cont efm/toco  resuscitative measures in place  Dr Washburn aware of above tracing   will re-evaluate & decide plan of care   remington de anda

## 2019-11-29 NOTE — PROGRESS NOTES
Internal Medicine Subjective





- Subjective


Patient seen and examined:: with staff, chart reviewed


Patient is:: awake, verbal, interactive, in bed


Per staff patient has:: no adverse event, no episodes of fall, poor appetite, 

tolerating meds





Internal Medicine Objective





- Physical Exam


Vitals and I&O: 


 Vital Signs











Temp  96.3 F   11/27/19 15:43


 


Pulse  0   11/28/19 22:17


 


Resp  20   11/28/19 20:00


 


BP  0/0   11/28/19 22:17


 


Pulse Ox  98   11/27/19 15:43








 Intake & Output











 11/28/19 11/29/19 11/29/19





 18:59 06:59 18:59


 


Intake Total 1500 120 


 


Balance 1500 120 


 


Intake:   


 


  Oral 1500 120 


 


Other:   


 


  # Voids 3 1 


 


  # Bowel Movements 0 1 











Active Medications: 


Current Medications





Acetaminophen (Tylenol)  650 mg PO Q4HR PRN


   PRN Reason: Mild Pain / Temp above 100


   Stop: 01/25/20 20:49


Al Hydrox/Mg Hydrox/Simethicone (Maalox)  30 ml PO Q4HR PRN


   PRN Reason: GI DISTRESS


   Stop: 01/25/20 22:43


Amlodipine Besylate (Norvasc)  5 mg PO BID [facility]


   Stop: 01/26/20 08:59


   Last Admin: 11/29/19 08:21 Dose:  Not Given


Aspirin (Aspirin)  325 mg PO DAILY@1200 [facility]


   Stop: 01/26/20 11:59


   Last Admin: 11/28/19 12:31 Dose:  Not Given


Atorvastatin Calcium (Lipitor)  40 mg PO HS [facility]


   Stop: 01/26/20 20:59


   Last Admin: 11/28/19 22:17 Dose:  40 mg


Bisacodyl (Dulcolax 10 Mg Supp)  10 mg RC PRN PRN


   PRN Reason: if MOM is ineffective


   Stop: 01/25/20 22:49


Divalproex Sodium (Depakote Dr)  250 mg PO BID [facility]; Protocol


   Stop: 01/27/20 08:59


   Last Admin: 11/29/19 08:22 Dose:  Not Given


Docusate Sodium (Colace)  100 mg PO DAILY [facility]


   Stop: 01/26/20 08:59


   Last Admin: 11/29/19 08:22 Dose:  Not Given


Insulin Glargine (Lantus Insulin)  10 units SUBQ Q12HR [facility]


   Stop: 01/26/20 08:59


   Last Admin: 11/29/19 08:22 Dose:  Not Given


Insulin Human Lispro (Humalog Insulin Sliding Scale)  100 units SUBQ HS [facility]; 

Protocol


   Stop: 01/26/20 20:59


   Last Admin: 11/28/19 21:45 Dose:  Not Given


Lisinopril (Zestril)  10 mg PO HS [facility]


   Stop: 01/26/20 20:59


   Last Admin: 11/28/19 22:17 Dose:  10 mg


Lorazepam (Ativan)  0.5 mg PO Q4HR PRN; Protocol


   PRN Reason: Anxiety


   Stop: 12/26/19 22:43


   Last Admin: 11/29/19 02:32 Dose:  0.5 mg


Magnesium Hydroxide (Milk Of Magnesia)  30 ml PO HS PRN


   PRN Reason: Constipation


Memantine (Namenda)  10 mg PO BID [facility]


   Stop: 01/26/20 08:59


   Last Admin: 11/29/19 08:22 Dose:  Not Given


Multivitamins/Vitamin C (Theragran)  1 tab PO DAILY [facility]


   Stop: 01/26/20 08:59


   Last Admin: 11/29/19 08:22 Dose:  Not Given


Quetiapine Fumarate (Seroquel)  400 mg PO BID [facility]; Protocol


   Stop: 01/26/20 08:59


   Last Admin: 11/29/19 08:22 Dose:  Not Given


Zolpidem Tartrate (Ambien)  5 mg PO HS PRN


   PRN Reason: Insomnia


   Stop: 01/25/20 22:43


   Last Admin: 11/28/19 22:16 Dose:  5 mg








General: weak


HEENT: NC/AT, PERRLA


Neck: Supple, No JVD


Lungs: CTAB


Cardiovascular: RRR, Normal S1, Normal S2, with murmur


Abdomen: soft, globular, non-distended, positive bowel sound


Extremities: excoriation


Neurological: no change





Internal Medicine Assmt/Plan





- Assessment


Assessment: 





ASSESSMENT:


1.  Diabetes.


2.  Renal insufficiency, elevated bilirubin.


3.  Hypertension.


4.  History of stroke.


5.  Dementia.


6.  Schizoaffective disorder.


7.  Hypercholesterolemia.


8.  Gait instability.











- Plan


Plan: 











PLAN:  __cont on __ insulin sliding scale.  We will start the patient on blood 

pressure


medication.  Continue fall precaution.  Continue aspirin.  We will continue


monitoring closely 


miguelangel lopez Patient arrived on unit via stretcher. Standing weight obtained @ 59.3kg. Dialysis initiated via left arm AVF with BH needles x 2 without difficulty. Orders and machine settings verified. Tx started. 400 BFR established. VSS.      Maintenance dialysis M-W-[id number]

## 2019-12-09 NOTE — TREATMENT PLAN
Subjective    Jerrica Marquez is a 28 y.o. female. she is here today for follow-up.    History of Present Illness     28 year old comes for follow up      reason - hypothyroidism,due to Hashimoto's        Currently 36 weeks pregnant      timing - constant     quality - not controlled     severity - moderate     symptoms - 60 + lb weight gain, fatigue, hair loss, constipation, arthralgias     alleviating - levothyroxine 175        Lab Results   Component Value Date    TSH 0.778 11/18/2019                --     obesity   despite exercise  following 1800 calorie diet     --     h o vit D Deficiency, not on supplements                             Evaluation history:  TSH   Date Value Ref Range Status   11/18/2019 0.778 0.270 - 4.200 uIU/mL Final     Free T4   Date Value Ref Range Status   04/09/2018 1.06 0.78 - 2.19 ng/dL Final       Current medications:  Current Outpatient Medications   Medication Sig Dispense Refill   • aspirin 81 MG chewable tablet Chew 81 mg Daily.     • cyanocobalamin 1000 MCG/ML injection INJECT 1 EACH AS DIRECTED TAKE AS DIRECTED. DAILY X 5, THEN WEEKLY FOR THE DURATION OF PREGNANCY 9 mL 1   • labetalol (NORMODYNE) 100 MG tablet Take 1 tablet by mouth 2 (Two) Times a Day. 60 tablet 6   • levothyroxine (SYNTHROID, LEVOTHROID) 175 MCG tablet Take 1 tablet by mouth Daily. 30 tablet 11   • Prenatal Vit-Fe Fumarate-FA (PRENATAL VITAMIN) 27-0.8 MG tablet Take 1 tablet by mouth Daily.       No current facility-administered medications for this visit.        The following portions of the patient's history were reviewed and updated as appropriate:   Past Medical History:   Diagnosis Date   • Abdominal pain     generalized cramping after eating wheat type products   • Acquired hypothyroidism     Secondary to Hashimoto's thyroiditis   • Back pain affecting pregnancy in second trimester    • Dysmenorrhea    • Endogenous obesity    • Excessive or frequent menstruation    • Hashimoto's thyroiditis    •  Hepatology Immunosuppression Monitoring Note      Chart reviewed.    LFTs slightly uptrend  Synthetic function intact    Prograf trough level is 4    Recommendations:  Daily tacrolimus trough level  CMP and INR daily  Continue tacrolimus dose at 6mg BID    We will continue to monitor.  Please call with any questions.    Benito Paredes MD  Gastroenterology Fellow ([id number])  Pager: [phone]         Headache    • Hypertension 2017    gestational hypertension   • Myopia    • Vitamin D deficiency      Past Surgical History:   Procedure Laterality Date   •  SECTION N/A 2017    Procedure:  SECTION PRIMARY;  Surgeon: Jayne Thibodeaux MD;  Location: [facility] LABOR DELIVERY;  Service:    • INJECTION OF MEDICATION  2013    Depo Provera (medroxyprogesterone acetate)   • INJECTION OF MEDICATION  2012    Kenalog x2   • INJECTION OF MEDICATION  2015    Toradol   • INJECTION OF MEDICATION  2015    Zofran   • WISDOM TOOTH EXTRACTION       Family History   Problem Relation Age of Onset   • Asthma Other    • Hypertension Other    • Other Other         Thyroid Disorder, Depressive Disorder, Smoking Tobacco, Anxiety   • Rheum arthritis Mother    • Hypertension Father    • Heart disease Maternal Grandfather    • Hypertension Maternal Grandfather    • Osteoporosis Paternal Grandmother    • Stroke Paternal Grandmother    • Anemia Paternal Grandmother    • Pulmonary embolism Maternal Grandmother      OB History        2    Para   1    Term   1            AB        Living   1       SAB        TAB        Ectopic        Molar        Multiple   0    Live Births   1              Allergies   Allergen Reactions   • Cefzil [Cefprozil] Hives   • Celexa [Citalopram]      Patient states that she is not allergic to this med. It causes fatigue   • Eggs Or Egg-Derived Products Diarrhea   • Influenza Vaccines      Migraines/ vomiting has reaction 2015- not in past      Social History     Socioeconomic History   • Marital status:      Spouse name: Not on file   • Number of children: Not on file   • Years of education: Not on file   • Highest education level: Not on file   Tobacco Use   • Smoking status: Never Smoker   • Smokeless tobacco: Never Used   Substance and Sexual Activity   • Alcohol use: No   • Drug use: No   • Sexual activity: Yes     Partners: Male       Review of  "Systems  Review of Systems   Constitutional: Negative for activity change, appetite change, diaphoresis and fatigue.   HENT: Negative for facial swelling, sneezing, sore throat, tinnitus, trouble swallowing and voice change.    Eyes: Negative for photophobia, pain, discharge, redness, itching and visual disturbance.   Respiratory: Negative for apnea, cough, choking, chest tightness and shortness of breath.    Cardiovascular: Negative for chest pain, palpitations and leg swelling.   Gastrointestinal: Negative for abdominal distention, abdominal pain, constipation, diarrhea, nausea and vomiting.   Endocrine: Negative for cold intolerance, heat intolerance, polydipsia, polyphagia and polyuria.   Genitourinary: Negative for difficulty urinating, dysuria, frequency, hematuria and urgency.   Musculoskeletal: Negative for arthralgias, back pain, gait problem, joint swelling, myalgias, neck pain and neck stiffness.   Skin: Negative for color change, pallor, rash and wound.   Neurological: Negative for dizziness, tremors, weakness, light-headedness, numbness and headaches.   Hematological: Negative for adenopathy. Does not bruise/bleed easily.   Psychiatric/Behavioral: Negative for behavioral problems, confusion and sleep disturbance.        Objective    /72   Pulse 90   Ht 165.1 cm (65\")   Wt 127 kg (280 lb 6.4 oz)   LMP 04/04/2019 (Approximate)   SpO2 97%   BMI 46.66 kg/m²   Physical Exam   Constitutional: She is oriented to person, place, and time. She appears well-developed and well-nourished. No distress.   HENT:   Head: Normocephalic and atraumatic.   Right Ear: External ear normal.   Left Ear: External ear normal.   Nose: Nose normal.   Eyes: Pupils are equal, round, and reactive to light. Conjunctivae and EOM are normal.   Neck: Normal range of motion. Neck supple. No tracheal deviation present. No thyromegaly present.   Cardiovascular: Normal rate, regular rhythm and normal heart sounds.   No murmur " heard.  Pulmonary/Chest: Effort normal and breath sounds normal. No respiratory distress. She has no wheezes.   Abdominal: Soft. Bowel sounds are normal. There is no tenderness. There is no rebound and no guarding.   Musculoskeletal: Normal range of motion. She exhibits no edema, tenderness or deformity.   Neurological: She is alert and oriented to person, place, and time. No cranial nerve deficit.   Skin: Skin is warm and dry. No rash noted.   Psychiatric: She has a normal mood and affect. Her behavior is normal. Judgment and thought content normal.       Lab Review  Lab Results   Component Value Date    TSH 0.778 11/18/2019     Lab Results   Component Value Date    FREET4 1.06 04/09/2018        Assessment/Plan      1. Hashimoto's thyroiditis    2. 36 weeks gestation of pregnancy    3. Class 3 severe obesity without serious comorbidity with body mass index (BMI) of 40.0 to 44.9 in adult, unspecified obesity type (CMS/HCC)    . This diagnosis was discussed and reviewed with the patient including the advantages of drug therapy.     1. Orders placed during this encounter include:  Orders Placed This Encounter   Procedures   • TSH   • T4, Free       Medications prescribed:  Outpatient Encounter Medications as of 12/9/2019   Medication Sig Dispense Refill   • aspirin 81 MG chewable tablet Chew 81 mg Daily.     • cyanocobalamin 1000 MCG/ML injection INJECT 1 EACH AS DIRECTED TAKE AS DIRECTED. DAILY X 5, THEN WEEKLY FOR THE DURATION OF PREGNANCY 9 mL 1   • labetalol (NORMODYNE) 100 MG tablet Take 1 tablet by mouth 2 (Two) Times a Day. 60 tablet 6   • levothyroxine (SYNTHROID, LEVOTHROID) 175 MCG tablet Take 1 tablet by mouth Daily. 30 tablet 11   • Prenatal Vit-Fe Fumarate-FA (PRENATAL VITAMIN) 27-0.8 MG tablet Take 1 tablet by mouth Daily.       No facility-administered encounter medications on file as of 12/9/2019.      Hypothyroidism due to hashimoto's     Currently pregnant 36  weeks      Target for TSH 2.50 or less             Taking 175 mcg daily              Lab Results   Component Value Date    TSH 0.778 11/18/2019       ====     Vit D Def , start 50 th q 2 w                    Component      Latest Ref Rng & Units 8/7/2018   25 Hydroxy, Vitamin D      30.0 - 100.0 ng/ml 23.8 (L)       stopped vitamin d      Taking prenatal            ====     B12 nl                   Lab Results   Component Value Date     [id number] 346 08/07/2018            --------------     stomach pain after eating breads and other foods     will check for celiac --negative        Weight Management         Patient's Body mass index is 46.66 kg/m². BMI is above normal parameters. Recommendations include: educational material.    Decrease daily caloric intake by 500 calories per day             4. Return in about 4 weeks (around 1/6/2020).

## 2020-07-02 NOTE — PROGRESS NOTES
Note that patient returned from dialysis awake, alert and fully oriented; HD RN reports that patient removed 2L of fluid; No complications and stable Bp; Meds given food warmed;  Denies pain;  Will continue to f/u;    PATIENT HAS BEEN TESTED AS NEGATIVE FOR COVID19    PATIENT HAS BEEN TO THE URGENT CARE TWICE SINCE MARCH    PATIENT HAS BEEN SEEING AN ENT AND HAS BEEN BIOPSIED ON HER THROAT. PATIENT GOES BACK Wednesday TO SEE IF SHE HAS PRE CANCER CELLS AS WELL AS SHE MAY HAVE SOME LUNG ISSUES.     PATIENT JUST WANTS TO UPDATE HER PCP ON HER HEALTH.     PATIENT CAN BE REACHED WITH ANY QUESTIONS  [phone]

## 2020-07-20 NOTE — CONSULTS
Malnutrition Findings:        patient is had a diagnosis of malignant cachexia  Patient already prior to her diagnosis of O2 dependent COPD and lung cancer had been extremely thin  Patient states that she she has adequate p o  indicate episode or he lost 2 lb since return to home  Patient is instructed to continue to work to supplement her diet, drinking a can or to ensure or boost on a daily basis  BMI Findings: Body mass index is 13 75 kg/m²  [facility]  Nephrology  Consult Note    Patient Name: Holly Patel  MRN: [id number]  Admission Date: 9/2/2019  Hospital Length of Stay: 0 days  Attending Provider: Savanah Pickens*   Primary Care Physician: Stan Sosa MD  Principal Problem:Intractable vomiting with nausea    Inpatient consult to Nephrology  Consult performed by: Darlyn Russell, MANFRED, FNP-C  Consult ordered by: PIERO McadamsC  Reason for consult: ESRD Management        Subjective:     HPI: The patient is a 28 year-old AA female with a past medical history of a 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 The patient and the mother endorsed an intermittent history of palpitations. She also endorsed c/o SOB, abdominal, and N/V. She was recently seen in the ED on 8/22 with similar complaints. In the ED, labs were significant for BNP 1269, Hypocalcemia 7.0, /120s, SBP >160s, LA 0.8. She was last dialyze on Saturday without issues. She normally dialyzes q T/Th/Sat at [facility] under the direction of Dr. Bass. She states that she has be compliant with all of her outpatient medication regimen and dialysis treatments. The patient is being admitted to hospital medicine for concerns for an ileus. Abdominal x-ray revealing mildly distended gas-filled small bowel loops in the right lower quadrant.  Findings nonspecific and could relate to a localized ileus or partial small bowel obstruction. Pending CT abdomen/pelvis. Patient remains tachycardic despite 500 mL boluses x 2. Nephrology consulted for management of ESRD and HD treatment.    Past Medical History:   Diagnosis Date    Anemia in ESRD (end-stage renal disease) 10/12/2015    dialysis tues, thursday, sat; access left arm    Chronic  rejection of liver transplant 3/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 ()    Moderate protein-calorie malnutrition 2017    MRSA bacteremia 2017    Pneumonia     Prophylactic immunotherapy 2014    Renovascular hypertension 10/2/2015    Secondary hyperparathyroidism 2017    Seizures     Sialadenitis 3/21/2018    Thrombocytopenia 2016       Past Surgical History:   Procedure Laterality Date    BIOPSY, LIVER, TRANSJUGULAR APPROACH N/A 2018    [facility]    BIOPSY-LIVER N/A 2017    [facility]    BIOPSY-LIVER N/A 3/22/2016    Per[facility]     SECTION      x 2    CONIZATION-CERVICAL-LEEP N/A 6/15/2018    Performed by [facility]quin [facility] at [facility] OR    [id number]; upper extremity Left 2015    [facility]    CRANIOPLASTY  2019    Performed by [facility] Powell [facility] at [facility]    CRANIOTOMY-- left crani for clot evac with microscrope Left 2019    Performed by [facility] at [facility]    EMBOLIZATION, BLOOD VESSEL N/A 2018    Performed by [facility]ren Ramos [facility] at [facility] CATH LAB    Exam Under Anesthesia N/A 2018    Performed by [facility] at [facility]    Exam under anesthesia N/A 2018    Performed by [facility] at [facility] OR    Exam under anesthesia (ADD ON ) N/A 2018    Performed by NICKIE Alvarez,  MD at [facility] OR    Exam under anesthesia -cervical suturing  N/A 7/26/2018    Performed by Lei Sims III, MD at [facility] OR    EXCISION, NEOPLASM, SKULL with Cranioplasty Left 4/22/2019    Performed by [facility] at [facility] OR [facility]    FISTULOGRAM Left 12/4/2015    Performed by Idalia Diaz MD at [facility] CATH LAB    LIVER BIOPSY      LIVER TRANSPLANT  09/1992    PARATHYROIDECTOMY, Minimally Invasive Bilateral Exploration Bilateral 3/27/2019    Performed by Ashley [facility]allpa [facility] at [facility]    SUTURE REPAIR,CERVIX  6/19/2018    Per[facility] by [facility] at [facility] OR    TUBAL LIGATION  2010       Review of patient's allergies indicates:   Allergen Reactions    Chloral hydrate Hallucinations     Other reaction(s): Hallucinations  Other reaction(s): Hives    Hydrocodone Other (See Comments)     Mental status changes    Tolerates oxycodone     Current Facility-Administered Medications   Medication Frequency    0.9%  NaCl infusion Once    acetaminophen tablet 650 mg Q4H PRN    albuterol-ipratropium 2.5 mg-0.5 mg/3 mL nebulizer solution 3 mL Q4H PRN    calcitRIOL capsule 1 mcg BID    cloNIDine tablet 0.3 mg BID    dextrose 10 % infusion PRN    dextrose 10 % infusion PRN    glucagon (human recombinant) injection 1 mg PRN    glucose chewable tablet 16 g PRN    glucose chewable tablet 24 g PRN    hydrALAZINE tablet 100 mg Q8H    irbesartan tablet 300 mg QAM    isosorbide mononitrate 24 hr tablet 30 mg Daily    lacosamide tablet 50 mg Q12H    levETIRAcetam tablet 500 mg BID    minoxidil tablet 10 mg Daily    morphine injection 1 mg Q4H PRN    morphine injection 2 mg Q4H PRN    ondansetron injection 4 mg Q8H PRN    pantoprazole EC tablet 40 mg Daily    promethazine (PHENERGAN) 6.25 mg in dextrose 5 % 50 mL IVPB Q6H PRN    ramelteon tablet 8 mg Nightly PRN    sodium chloride 0.9% flush 10 mL PRN    sodium chloride 0.9% flush 5 mL PRN    sodium chloride tablet 2 g BID     tacrolimus capsule 5 mg BID    verapamil CR tablet 240 mg QHS     Current Outpatient Medications   Medication    bacitracin 500 unit/gram ointment    bisacodyl (DULCOLAX) 10 mg Supp    calcitRIOL (ROCALTROL) 0.5 MCG Cap    cloNIDine (CATAPRES) 0.3 MG tablet    epoetin anca-epbx (RETACRIT) 3,000 unit/mL injection    hydrALAZINE (APRESOLINE) 100 MG tablet    irbesartan (AVAPRO) 300 MG tablet    isosorbide mononitrate (IMDUR) 30 MG 24 hr tablet    lacosamide (VIMPAT) 50 mg Tab    levETIRAcetam (KEPPRA) 500 MG Tab    minoxidil (LONITEN) 10 MG Tab    ondansetron (ZOFRAN-ODT) 4 MG TbDL    pantoprazole (PROTONIX) 40 MG tablet    polyethylene glycol (GLYCOLAX) 17 gram PwPk    senna-docusate 8.6-50 mg (PERICOLACE) 8.6-50 mg per tablet    sevelamer HCl (RENAGEL) 800 MG Tab    sodium chloride 1 gram tablet    tacrolimus (PROGRAF) 5 MG Cap    verapamil (VERELAN) 240 MG C24P     Family History     Problem Relation (Age of Onset)    Alzheimer's disease Paternal Grandmother    Cancer Sister    Heart attack Maternal Uncle    Heart disease Paternal Grandmother    Hypertension Mother, Father, Paternal Grandmother    No Known Problems Brother, Brother, Sister, Sister        Tobacco Use    Smoking status: Never Smoker    Smokeless tobacco: Never Used   Substance and Sexual Activity    Alcohol use: No    Drug use: Never    Sexual activity: Never     Partners: Male     Review of Systems   Constitutional: Positive for appetite change. Negative for activity change, chills, fatigue, fever and unexpected weight change.   HENT: Negative for hearing loss, trouble swallowing and voice change.    Eyes: Negative for visual disturbance.   Respiratory: Positive for shortness of breath. Negative for cough, choking, chest tightness and wheezing.    Cardiovascular: Positive for palpitations. Negative for chest pain and leg swelling.   Gastrointestinal: Positive for abdominal pain, nausea and vomiting. Negative for  abdominal distention, blood in stool, constipation and diarrhea.   Genitourinary: Positive for decreased urine volume. Negative for dysuria, flank pain, frequency, hematuria and urgency.   Musculoskeletal: Negative for arthralgias, back pain and myalgias.   Skin: Negative for color change and rash.   Neurological: Negative for dizziness, weakness, light-headedness, numbness and headaches.   Psychiatric/Behavioral: Negative for agitation, behavioral problems, confusion and decreased concentration.     Objective:     Vital Signs (Most Recent):  Temp: 98.4 °F (36.9 °C) (09/03/19 0700)  Pulse: (!) 120 (09/03/19 0955)  Resp: 20 (09/03/19 0955)  BP: (!) 159/97 (09/03/19 0955)  SpO2: 95 % (09/03/19 0955)  O2 Device (Oxygen Therapy): room air (09/03/19 0955) Vital Signs (24h Range):  Temp:  [98.4 °F (36.9 °C)] 98.4 °F (36.9 °C)  Pulse:  [110-128] 120  Resp:  [13-24] 20  SpO2:  [95 %-98 %] 95 %  BP: (108-191)/() 159/97     Weight: 51.7 kg (114 lb) (09/03/19 0700)  Body mass index is 18.97 kg/m².  Body surface area is 1.54 meters squared.    I/O last 3 completed shifts:  In: 1000 [IV Piggyback:1000]  Out: -     Physical Exam   Constitutional: She is oriented to person, place, and time. She appears well-developed and well-nourished. She is sleeping. She appears ill. No distress.   HENT:   Head: Normocephalic and atraumatic.   Eyes: Pupils are equal, round, and reactive to light.   Neck: Neck supple. Carotid bruit is not present. No thyromegaly present.   Cardiovascular: Normal rate and regular rhythm. Exam reveals no gallop.   No murmur heard.  Pulmonary/Chest: Effort normal and breath sounds normal. No respiratory distress. She has no wheezes.   Abdominal: Soft. Bowel sounds are normal. She exhibits no distension and no mass. There is no splenomegaly or hepatomegaly. There is tenderness.   Musculoskeletal: Normal range of motion. She exhibits no edema or deformity.   Neurological: She is oriented to person, place, and  time. No cranial nerve deficit or sensory deficit.   Skin: Skin is warm and dry. No rash noted.   Psychiatric: She has a normal mood and affect. Her behavior is normal.       Significant Labs:  CBC:   Recent Labs   Lab 09/03/19  0517   WBC 6.14   RBC 4.65   HGB 11.7*   HCT 37.2   *   MCV 80*   MCH 25.2*   MCHC 31.5*     CMP:   Recent Labs   Lab 09/03/19  0517   *   CALCIUM 6.9*   ALBUMIN 3.3*   PROT 7.8      K 4.1   CO2 19*      BUN 42*   CREATININE 8.5*   ALKPHOS 444*   ALT 27   AST 26   BILITOT 1.0     Assessment/Plan:     * Intractable vomiting with nausea  - Being follow by primary care team   - Abdominal x-ray revealing mildly distended gas-filled small bowel loops in the right lower quadrant.  Findings nonspecific and could relate to a localized ileus or partial small bowel obstruction.   - Pending CT abdomen/pelvis.    ESRD on hemodialysis  The patient is a 29 yo F being admitted with a palpitations and concern for a bowel ileus. The patient presented to the hospital with complaints of palpitations x 12 hrs and abdominal pain, nausea, and vomiting. X-ray abdomen showed mildly distended gas-filled small bowel loops in the right lower quadrant.  Findings nonspecific and could relate to a localized ileus or partial small bowel obstruction. Pending CT abdomen/pelvis. Last ECHO 09/2018, EF 65-70%.     T/Th/Sat - [facility]  Dr. Bass   EDW ~48.5 kg  (last adjusted 8/3)  LUE AVF  Minimum urine     Assessment:   - Will provide HD today per outpatient HD schedule, will dialyze today for metabolic clearance and volume management  - Target UF 2-3 L as tolerated, keep MAP >65  - Patient appears to 3.2 kg above her dry weight which was recently adjusted to 48.5 kg on last month (8/3)  - Continue home clonidine 0.3mg BID, hydralazine 100mg TID, irbesartan 300mg daily, isosorbide mononitrate 30mg daily, minoxidil 10mg daily, verapamil 240mg qHS.  - Agree with repeat Echo   - Liberalize diet   - Phos  2.7, will likely give phos replacements after HD today today  - Please hold binders at this time   - Hgb 11.7, within range   - Will collect outpatient care plan   - Continue to monitor intake and output, daily weights   - Avoid nephrotoxic medication and renal dose medications to GFR  - Will discuss with staff      Tachycardia  - Being follow by primary team     Hypocalcemia  History of Hyperparathyroidism s/p parathyroidectomy. History of noncompliance with calcium replacements.     - CoCa 7.46  - On Calcitriol 1 mcg BID, will adjust dosing to 2 mcg BID per OP Nephrologist   - Will adjust calcium bath during dialysis   - Will order iCA and Vitamin D   - PTH elevated 531.0 from 406.9 in April 2019        Thank you for your consult. I will follow-up with patient. Please contact us if you have any additional questions.    Darlyn Russell DNP, FNP-C  Nephrology  [facility]

## 2020-11-25 NOTE — TELEPHONE ENCOUNTER
Call regarding: Patient states she asked MD to fill out a form for her insurance company for the Medicare Wellness Visit she had in October.  She states the MD said he would not fill out this form.  Patient would like to know if MD's staff can fill this out and have MD sign it.  Please advise and call patient.     Okay to leave detailed voice mail?  Yes    Pharmacy information verified:  No        Discussed with Dr. Garza. U/s scheduled for today and then biopsy can be scheduled.     Called pt to advise that labs are still elevated and that she will need biopsy. Also discussed the importance of the u/s today so we can schedule the biopsy for this week. Pt verbalized understanding.

## 2021-01-06 NOTE — LETTER
Group Topic: BH Check-in/Symptom Rating    Date: 1/5/2021  Start Time: 1745  End Time: 1830  Facilitators: Rupa Rodríguez LCSW    Focus: Check-in/Process  Number in attendance: 9    Pt participated in a group check-in, sharing their feelings, mood, and a high/low of the day. Pt discussed their current life stressors, precipitants, family, and living situations. Pt processed their day and current feelings. Positive feedback and support was offered.         Method: Group  Attendance: Present  Participation: Active  Patient Response: Attentive  Mood: Normal  Affect: Type: Euthymic (normal mood)   Range: Full (normal)   Congruency: Congruent   Stability: Stable  Behavior/Socialization: Engaged  Thought Process: Focused  Task Performance: Follows directions  Patient Evaluation: Independent - full participation     Pt participated and engaged in group well. Initially, pt was irrirated as she did not get to make her phone call to her best friend, prior to group, stating \"my friend works and this is the only time I can call her\". Pt rated her mood as 3/4 out of 10, sharing a high is her conversation with mom and planning to go to her cousin's birthday party. Pt shared how she ate today, stating \"I ate today! I ate mac n cheese, ensure, pretzels, and jello, I am proud of myself\". Writer and peers validated this and offered support. Pt shared a low of the day was \"my mid day breakdown\", sharing how she was in the quiet room and cried for \"3 hours\". Pt processed well and engaged with peers well, offering positive support.   January 30, 2019    Holly Patel  [address] [place] [id number]          Dear Holly Patel:  MRN: [id number]    This is a follow up to your recent labs, your lab results were stable.  There are no medicine changes.  Please have your labs drawn again on 2/25/19.      Remember to stay compliant with all medications.     If you cannot have your labs drawn on the scheduled date, it is your responsibility to call the transplant department to reschedule.  To reschedule or make an appointment, please as to speak to or leave a message for my assistant, Jaki Chavis, at [phone].  When leaving a message for Palmira Pollack, or myself, we ask that you leave a brief message regarding your request.    Sincerely,      Violeta Francis, RN, BSN, [facility]  Liver Transplant Coordinator  [facility]  [address] [place] [id number]  [phone]

## 2021-03-20 NOTE — TELEPHONE ENCOUNTER
----- Message from Lei Pinedo MD sent at 7/24/2017  2:44 PM CDT -----  Results reviewed    
Tac pending  
No

## 2021-07-27 NOTE — PLAN OF CARE
Problem: Patient Care Overview  Goal: Plan of Care Review  Outcome: Ongoing (interventions implemented as appropriate)  Pt AAOx4. VSS. Q2h rounding maintained throughout night for pt care and safety. Only report of pain was for a headache. No bleeding reported throughout night. No falls reported. SCDs in place. Safety cautions maintained - bed in low position, call light in reach, side rails up x2. Will continue to monitor.       Detail Level: Zone Plan: Discussed with patient the use of a sunscreen with a Zinc oxide base. Recommended the LaRoche Posay Toleriane skin care line.

## 2021-09-08 NOTE — TELEPHONE ENCOUNTER
Spoke with Marifer who could not give enough accurate information on what she called me and could not schedule patient's appointment because she couldn't find her in the system because she hasn't been there in so long and told her I would just call the patient myself and schedule her appointment and I did for Monday the 25th at 1:00 PM.    [FreeTextEntry1] : 35 yo male s/p anal fissure treatment w/ LIS.  presents for evaluation of swelling and itching.  He denies pain and reports is occurring 2-3 times/week.  no abdominal pain.  some blood noted.  resolves w/ fiber supplement

## 2021-11-25 NOTE — HPI
28F w/ PMH liver transplant in 91', thyroidectomy, ESRD (on HD MWF) and craniotomy for tumor 4/22 who presents to [facility] ED for HA and hypertension. Patient states that since the surgery, she has had multiple HAs that typically coincide with elevated blood pressure. She describes them as sharp pain  That localize to the left side of her head and rates the pain 10/10. She also reports blurred vision with these episodes. She denies F/N/C/V, Sz/AMS, weakness/numbness/tingling, gait instability, dizziness, incontinence, SOB/CP, MENDOSA, neck stiffness.       Pressure injury stage 2 or greater

## 2022-01-01 NOTE — SUBJECTIVE & OBJECTIVE
Interval History: No acute events overnight. Blood pressure still high. Does not know names of home medications.     Review of patient's allergies indicates:   Allergen Reactions    Chloral hydrate      Other reaction(s): Hallucinations  Other reaction(s): Hives    Hydrocodone Other (See Comments)     Mental status changes     Current Facility-Administered Medications   Medication Frequency    0.9%  NaCl infusion PRN    0.9%  NaCl infusion Once    cephALEXin capsule 500 mg Q12H    cinacalcet tablet 90 mg Daily with breakfast    [START ON 1/26/2018] cloNIDine 0.2 mg/24 hr td ptwk 1 patch Every Fri    dextrose 50% injection 12.5 g PRN    dextrose 50% injection 25 g PRN    dicyclomine tablet 20 mg BID    famotidine tablet 20 mg Daily    furosemide tablet 100 mg BID    glucagon (human recombinant) injection 1 mg PRN    glucose chewable tablet 16 g PRN    glucose chewable tablet 24 g PRN    heparin (porcine) injection 5,000 Units Q8H    hydrALAZINE tablet 25 mg Q12H    labetalol tablet 800 mg Q8H    metroNIDAZOLE tablet 500 mg Q12H    NIFEdipine 24 hr tablet 60 mg Daily    ondansetron tablet 4 mg Q6H    oxyCODONE immediate release tablet 5 mg Q4H PRN    predniSONE tablet 1 mg Every other day    sodium chloride 0.9% flush 5 mL PRN    tacrolimus capsule 7 mg BID       Objective:     Vital Signs (Most Recent):  Temp: 98.2 °F (36.8 °C) (01/25/18 1122)  Pulse: 90 (01/25/18 1122)  Resp: 20 (01/25/18 1122)  BP: (!) 162/100 (01/25/18 1122)  SpO2: 96 % (01/25/18 1122)  O2 Device (Oxygen Therapy): room air (01/25/18 1122) Vital Signs (24h Range):  Temp:  [97.2 °F (36.2 °C)-98.6 °F (37 °C)] 98.2 °F (36.8 °C)  Pulse:  [85-98] 90  Resp:  [15-20] 20  SpO2:  [86 %-97 %] 96 %  BP: (149-207)/() 162/100     Weight: 59.2 kg (130 lb 8.2 oz) (01/24/18 1700)  Body mass index is 21.72 kg/m².  Body surface area is 1.65 meters squared.    I/O last 3 completed shifts:  In: 740 [P.O.:240; Other:500]  Out: 3000  [Other:3000]    Physical Exam   Constitutional: She is oriented to person, place, and time. Vital signs are normal. She appears well-developed.   Eyes: EOM are normal.   Neck: Normal range of motion. Neck supple.   Cardiovascular: Normal rate and regular rhythm.    Pulmonary/Chest: Effort normal and breath sounds normal.   Abdominal: Soft. Bowel sounds are normal.   Musculoskeletal: Normal range of motion. She exhibits no edema or deformity.   Neurological: She is alert and oriented to person, place, and time.   Skin: Skin is dry.   L RC AVG bruit and thrill.    Psychiatric: She has a normal mood and affect.       Significant Labs:  All labs within the past 24 hours have been reviewed.     Significant Imaging:  Labs: Reviewed   Home

## 2022-06-15 NOTE — PROGRESS NOTES
[facility]  General Surgery  Progress Note    Subjective:     History of Present Illness:  No notes on file    Post-Op Info:  Procedure(s) (LRB):  PARATHYROIDECTOMY, Minimally Invasive Bilateral Exploration (Bilateral)   4 Days Post-Op     Interval History: NAEON. Still on calcium gtt. Pain controlled, tolerating diet. Ambulating.    Medications:  Continuous Infusions:   custom IV infusion builder 30 mL/hr at 03/30/19 1927     Scheduled Meds:   calcitRIOL  1 mcg Oral BID    calcium carbonate  2,000 mg Oral QID    carvedilol  25 mg Oral BID    ergocalciferol  50,000 Units Oral Daily    famotidine  40 mg Oral QAM    hydrALAZINE  50 mg Oral Q8H    irbesartan  300 mg Oral QAM    lacosamide  50 mg Oral BID    levETIRAcetam  500 mg Oral BID    senna-docusate 8.6-50 mg  1 tablet Oral BID    tacrolimus  6 mg Oral BID    verapamil  240 mg Oral QHS     PRN Meds:sodium chloride, acetaminophen, acetaminophen-codeine 300-30mg, diphenhydrAMINE, ondansetron, oxyCODONE, oxyCODONE, promethazine (PHENERGAN) IVPB, ramelteon, sodium chloride 0.9%     Review of patient's allergies indicates:   Allergen Reactions    Chloral hydrate Hallucinations     Other reaction(s): Hallucinations  Other reaction(s): Hives    Hydrocodone Other (See Comments)     Mental status changes     Objective:     Vital Signs (Most Recent):  Temp: 96.2 °F (35.7 °C) (03/31/19 0419)  Pulse: 86 (03/31/19 0419)  Resp: 18 (03/31/19 0419)  BP: (!) 158/92 (03/31/19 0419)  SpO2: 99 % (03/31/19 0419) Vital Signs (24h Range):  Temp:  [96.2 °F (35.7 °C)-98.9 °F (37.2 °C)] 96.2 °F (35.7 °C)  Pulse:  [70-86] 86  Resp:  [18] 18  SpO2:  [99 %-100 %] 99 %  BP: (116-172)/(72-99) 158/92     Weight: 52.2 kg (115 lb 1.3 oz)  Body mass index is 19.15 kg/m².    Intake/Output - Last 3 Shifts       03/29 0700 - 03/30 0659 03/30 0700 - 03/31 0659 03/31 0700 - 04/01 0659    Other  350     Total Intake(mL/kg)  350 (6.7)     Other  3150     Total Output   3150     Net  -2800            Urine Occurrence 2 x            Physical Exam  NAD, resting well  RRR  Non labored respirations  Neck incision cdi, soft, no hematoma, non ttp  Voice strong, not hoarse, unchanged from pre-op  Extremities wwp      Significant Labs:  BMP:   Recent Labs   Lab 03/30/19  0355 03/31/19  0555     --    *  --    K 4.3  --    CL 98  --    CO2 23  --    BUN 25*  --    CREATININE 7.3*  --    CALCIUM 7.0*  --    MG 2.0 2.1     CMP:   Recent Labs   Lab 03/30/19  0355      CALCIUM 7.0*   ALBUMIN 2.5*   PROT 6.8   *   K 4.3   CO2 23   CL 98   BUN 25*   CREATININE 7.3*   ALKPHOS 765*   ALT 7*   AST 29   BILITOT 0.6     Assessment/Plan:     * Hyperparathyroidism due to end stage renal disease on dialysis  27 y/o female with pmhx of liver txp, ESRD on HD MWF (anuric), seizures and HTN s/p subtotal parathyroidectomy for secondary hyperparathyroidism 3/27. 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     - Appreciate endocrinology assistance with hypocalcemia calcium; continues on calcium gtt  - continue q6h iCa checks  - continue 2g calcium PO TID, 0.5mg Rocaltrol BID  - appreciate nephrology assistance HD  - appreciate hepatology assistance with immunosuppressant medications  - regular diet  - prn pain medications  - ambulate, encourage IS  - home medications restarted; HTN well controlled  - ice to incision prn        Jun Sibley MD  General Surgery  [facility]   Lot # (Optional): [id number] Lot # (Optional): [id number]

## 2022-10-04 NOTE — ASSESSMENT & PLAN NOTE
- appears to be somewhat of a chronic issue, as last BP in clinic a week ago was roughly in the same range  - potentially side effect of tacrolimus vs progressive fluid accumulation with inadequate HD as outpatient vs medication non-compliance  - consult Nephrology for HD  - increase home labetalol to 800 tid and nifedipine to 60; continue home hydralazine 25 bid, clonidine patch 0.2/wk  - add lasix 100 bid as patient is still making urine     Called patient regarding her menstrual bleeding after explanon.  Discussed with patient issues regarding unscheduled bleeding with contraceptives.  Patient is otherwise asymptomatic.  No a pain.  Advised her that pregnancy must be ruled out, patient elected to perform at home pregnancy test.  May try NSAIDs.  If persistent symptoms then patient should be evaluated for further management.  All questions were answered.

## 2022-12-20 NOTE — SUBJECTIVE & OBJECTIVE
Interval History:   Taken back to OR yesterday afternoon for hematoma evacuation.  Intubated for airway protection, remains intubated this morning.  NSGY/NCC following, ok with RRT today.  Mild hyperkalemia on AM labs.  Remains on cardene gtt.    Review of patient's allergies indicates:   Allergen Reactions    Chloral hydrate Hallucinations     Other reaction(s): Hallucinations  Other reaction(s): Hives    Hydrocodone Other (See Comments)     Mental status changes     Current Facility-Administered Medications   Medication Frequency    0.9%  NaCl infusion (for blood administration) Q24H PRN    0.9%  NaCl infusion (for blood administration) Q24H PRN    0.9%  NaCl infusion (for blood administration) Q24H PRN    0.9%  NaCl infusion (for blood administration) Q24H PRN    0.9%  NaCl infusion (for blood administration) Q24H PRN    0.9%  NaCl infusion (for blood administration) Q24H PRN    0.9%  NaCl infusion (for blood administration) Q24H PRN    0.9%  NaCl infusion (for blood administration) Q24H PRN    0.9%  NaCl infusion (for blood administration) Q24H PRN    0.9%  NaCl infusion Once    calcitriol solution 0.5 mcg BID    calcium carbonate (OS-WOLFGANG) tablet 500 mg TID    carvedilol tablet 25 mg BID    ceFAZolin injection 2 g Daily    chlorhexidine 0.12 % solution 15 mL BID    dexamethasone injection 4 mg Q6H    dextrose 50% injection 12.5 g PRN    famotidine tablet 20 mg Daily    glucagon (human recombinant) injection 1 mg PRN    hydrALAZINE injection 10 mg Q4H PRN    hydrALAZINE tablet 25 mg Q8H PRN    insulin aspart U-100 pen 1-10 Units Q6H PRN    labetalol 20 mg/4 mL (5 mg/mL) IV syring Q4H PRN    lacosamide tablet 50 mg BID    levETIRAcetam tablet 500 mg BID    mupirocin 2 % ointment 1 g BID    niCARdipine 40 mg/200 mL infusion Continuous    ondansetron disintegrating tablet 4 mg Q6H PRN    oxyCODONE immediate release tablet 5 mg Q6H PRN    propofol (DIPRIVAN) 10 mg/mL infusion  Continuous    senna-docusate 8.6-50 mg per tablet 2 tablet Daily    sodium chloride 0.9% flush 10 mL PRN    tacrolimus (PROGRAF) 1 mg/mL oral syringe BID       Objective:     Vital Signs (Most Recent):  Temp: 98.9 °F (37.2 °C) (04/23/19 1100)  Pulse: 79 (04/23/19 1133)  Resp: (!) 21 (04/23/19 1133)  BP: 126/67 (04/23/19 1100)  SpO2: 100 % (04/23/19 1133)  O2 Device (Oxygen Therapy): ventilator (04/23/19 1133) Vital Signs (24h Range):  Temp:  [96.5 °F (35.8 °C)-99 °F (37.2 °C)] 98.9 °F (37.2 °C)  Pulse:  [] 79  Resp:  [7-44] 21  SpO2:  [97 %-100 %] 100 %  BP: ()/() 126/67  Arterial Line BP: (111-198)/(45-92) 143/72     Weight: 54.8 kg (120 lb 13 oz) (04/22/19 1100)  Body mass index is 22.1 kg/m².  Body surface area is 1.55 meters squared.    I/O last 3 completed shifts:  In: 3326.6 [I.V.:1860.7; Blood:1415.9; IV Piggyback:50]  Out: 125 [Drains:125]    Physical Exam   Constitutional: She appears well-developed. No distress. She is intubated.   HENT:   Right Ear: External ear normal.   Left Ear: External ear normal.   Drain in place near L temporal region   Eyes: Conjunctivae and EOM are normal. Right eye exhibits no discharge. Left eye exhibits no discharge.   Neck: Normal range of motion. Neck supple.   Cardiovascular: Normal rate and regular rhythm. Exam reveals no gallop and no friction rub.   No murmur heard.  Pulmonary/Chest: Effort normal and breath sounds normal. She is intubated. No respiratory distress. She has no wheezes. She has no rales.   Abdominal: Soft. Bowel sounds are normal. She exhibits no distension. There is no tenderness.   Musculoskeletal: Normal range of motion. She exhibits no edema or deformity.   Neurological: She is alert.   Skin: Skin is warm and dry. She is not diaphoretic.   Psychiatric: She has a normal mood and affect. Her behavior is normal.       Significant Labs:  ABGs:   Recent Labs   Lab 04/23/19  0022   PH 7.340*   PCO2 39.1   HCO3 21.1*   POCSATURATED 100    BE -5     CBC:   Recent Labs   Lab 04/23/19  0517   WBC 5.06   RBC 2.58*   HGB 6.8*   HCT 21.4*   *   MCV 83   MCH 26.4*   MCHC 31.8*     CMP:   Recent Labs   Lab 04/23/19  0228   *   CALCIUM 6.6*   ALBUMIN 3.0*   PROT 7.6      K 5.4*   CO2 20*      BUN 56*   CREATININE 8.4*   ALKPHOS 602*   ALT 28   AST 27   BILITOT 0.6           Health Care Proxy

## 2023-06-26 NOTE — PROGRESS NOTES
Hydrazaline IVP administered for /102 HR 82   [facility]  Gastroenterology  Consult Note    Patient Name: Manuel Shelby  MRN: [id number]  Admission Date: 6/12/2023  Hospital Length of Stay: 11 days  Code Status: Full Code   Attending Provider: Daren Mathews MD   Consulting Provider: Ed Gibson MD  Primary Care Physician: Andrew Ford MD  Principal Problem:Anemia    Inpatient consult to [facility] (AES)  Consult performed by: Ed Gibson MD  Consult ordered by: Anastasiia Do MD      Subjective:     HPI:   Mr Shelby is a 89yo PMHx afib on eliquis, HFrEF (40% in 2015) presented to [facility] as transfer on 06/16 for further evaluation of 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    AES consulted for consideration of repeat scope in setting of continued GI blood loss and clip placement for marking in consideration of palliative radiation therapy.    Initially presented to [facility] 06/12 for melena, Hgb 6.3. Prior EGD 04/2023 w/ multiple non-bleeding angioectasias in stomach treated w/ APC. Repeat EGD 06/13 w/ normal esophagus, stomach, non-obstructing actively bleeding duodenal ulcer--biopsied and hemospray. Pathology demonstrated poorly differentiated malignancy.    Transferred to [facility] 06/16. Palliative care has been involved. Hospital course notable for imaging concerning for metastatic disease to brain. Also treating for PNA w/ meropenem.     Heme/Onc following as well with NGS testing demonstrating PD-L1 90%    VS/24h unremarkable.  CBC w/ Hgb 7.4-->6.8, plts wnl.  BMP w/ BUN/ Cr 43/ 0.7  LFTs unremarkable.    Objective:     Vitals:    06/26/23 1133   BP:    Pulse: (!) 111   Resp:    Temp:          Constitutional:  not in acute distress and well developed  HENT: Head: Normal, normocephalic, atraumatic.  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    AES consulted for consideration of repeat scope in setting of continued GI blood loss and clip placement for marking in consideration of palliative radiation therapy.    Problem List:  Duodenal malignancy    Recommendations:  Will plan for EGD tomorrow 06/27    Thank you for involving us in the care of Manuel Shelby. Please call with any additional questions, concerns or changes in the patient's clinical status. We will continue to follow.     Ed Gibson MD  Gastroenterology Fellow PGY VI  [facility]

## 2023-07-17 NOTE — TELEPHONE ENCOUNTER
----- Message from Michael Mendoza MD sent at 12/11/2018 10:21 PM CST -----  Yes she is cleared for transplant from a Neurology standpoint.    Dr. Mendoza  ----- Message -----  From: Lien Petersen  Sent: 12/11/2018  10:19 AM  To: Michael Mendoza MD    Requesting again please.    Lien  ----- Message -----  From: Adriana Guzmán  Sent: 10/2/2018   1:27 PM  To: Lien Petersen, MD Dr. Reji Pugh,    This patient is being considered for kidney transplant. She has a history of seizures.  She was seen by you in 07/2018 and due for follow up in 01/2019. Is she cleared from a neurologist standpoint?     Adriana Guzmán RN       [Abn Vag Bleeding] : abnormal vaginal bleeding [Nl] : Integumentary

## 2023-08-24 NOTE — PLAN OF CARE
Problem: Patient Care Overview  Goal: Plan of Care Review  Patient admitted to room 422A with mother at bedside. Patient unable to void and straight cath in ED unproductive. Patient states she only voids once or twice a week. No signs and symptoms of infection noted at this time. Patient states even though her blood pressure is currently high that it is typically much higher and she took her htn medications in ED. Patient has denied pain, headache, or shortness of breath and has slept most of the night. No acute distress noted at this time.       [Recent Weight Gain (___ Lbs)] : recent [unfilled] ~Ulb weight gain [Lower Ext Edema] : lower extremity edema [Shortness Of Breath] : shortness of breath [Limb Swelling] : limb swelling

## 2024-09-09 NOTE — CONSULTS
Admission in July 2024 for ANGELICA. 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 S/p PLEX.  Patient is anuric and dialysis-dependent  Tues/Thurs/Sat schedule  Access: Right IJ PermCath, Cath reevaluation  by IR on 9/3, no issues during HD. 9/4, issues with clotting, will needs re-evaluation @9.5. Requires Re-evaluation 9/6 IR, Fluid poor returns on 9/5. 9/6-bedside readjustment was made by IR, reclotted after 1.5 hours, Cathflo dwelling overnight. 9/7 - HD without issues . 9/8 -cannot achieve good flow.  9/9-IR procedure unavailable, facilitated conversation to IR and nephrology regarding plans for dialysis.  9/10 - catheter exchanged, no issue with hemodialysis    Plan:  Hemodialysis schedule per nephro   continue PTA cyclophosphamide  Continue PTA sevalemer  Continue PTA atovaquone for PJP prophylaxis  Started Lokelma per nephro  Continue prednisone taper from outpatient nephrology prior to this admission  20 mg once daily till September 6  15 mg starting September 7 to September 20  12.5 mg starting September 21 to October 4  10 mg starting October 5 to October 18  7.5 mg starting October 19 to November 2  5 mg daily starting November 3   Consult/H&P Note  Interventional Radiology    Consult Requested By: OB    Reason for Consult: vaginal bleeding    SUBJECTIVE:     Chief Complaint: vaginal bleeding    History of Present Illness: 26 yo F with extensive medical history.  SHe recently had LEEP procedure on 6/15 with takeback on  and  for vaginal bleeding.  She reports persistent bleeding since 6/15 some acute worsening yesterday.  She has also had significant HTN, likely contributing to the bleeding.  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 ()    Moderate protein-calorie malnutrition 2017    MRSA bacteremia 2017    Pneumonia     Prophylactic immunotherapy 2014    Renovascular hypertension 10/2/2015    Secondary hyperparathyroidism 2017    Seizures     Sialadenitis 3/21/2018    Thrombocytopenia 2016     Past Surgical History:   Procedure Laterality Date     SECTION      x 2    CONIZATION OF CERVIX USING LOOP ELECTROSURGICAL EXCISION PROCEDURE (LEEP) N/A 6/15/2018    Procedure: CONIZATION-CERVICAL-LEEP;  Surgeon: Neelam Marroquin MD;  Location: [facility] OR;  Service: OB/GYN;  Laterality: N/A;    EXAMINATION UNDER ANESTHESIA N/A 2018    Procedure: Exam under anesthesia;  Surgeon: Neelam Marroquin MD;  Location: [facility] OR;  Service: OB/GYN;  Laterality: N/A;    EXAMINATION UNDER  ANESTHESIA N/A 7/9/2018    Procedure: Exam under anesthesia (ADD ON );  Surgeon: NICKIE Alvarez MD;  Location: [facility] OR;  Service: OB/GYN;  Laterality: N/A;  (ADD ON )    LIVER BIOPSY      LIVER TRANSPLANT  09/1992    PERINEORRHAPHY  6/19/2018    Procedure: SUTURE REPAIR,CERVIX;  Surgeon: Neelam Marroquin MD;  Location: [facility] OR;  Service: OB/GYN;;    TUBAL LIGATION  2010     Family History   Problem Relation Age of Onset    Hypertension Mother     Hypertension Father     Melanoma Neg Hx     Breast cancer Neg Hx     Colon cancer Neg Hx     Ovarian cancer Neg Hx      Social History   Substance Use Topics    Smoking status: Never Smoker    Smokeless tobacco: Never Used    Alcohol use No       Review of Systems:  Per primary team      OBJECTIVE:     Vital Signs Range (Last 24H):  Temp:  [96.9 °F (36.1 °C)-98.9 °F (37.2 °C)]   Pulse:  []   Resp:  [16-31]   BP: (135-194)/()   SpO2:  [99 %-100 %]     Physical Exam:  General- Patient alert and oriented x3 in NAD  ENT- PERRLA,  Neck- No masses  CV- Regular rate and rhythm  Resp-  No increased WOB  GI- Non tender/non-distended  Neuro-  No focal deficits noted.     Physical Exam  Body mass index is 19.08 kg/m².    Scheduled Meds:    sodium chloride 0.9%   Intravenous Once    aspirin  81 mg Oral Daily    cinacalcet  30 mg Oral Daily with breakfast    hydrALAZINE  100 mg Oral Q8H    labetalol  200 mg Oral Q8H    lisinopril  40 mg Oral Daily    mycophenolate  1,000 mg Oral BID    NIFEdipine  60 mg Oral Daily    predniSONE  1 mg Oral Daily    tacrolimus  7 mg Oral BID     Continuous Infusions:   PRN Meds:sodium chloride, ondansetron, promethazine (PHENERGAN) IVPB, sodium chloride 0.9%    Allergies:   Review of patient's allergies indicates:   Allergen Reactions    Chloral hydrate      Other reaction(s): Hallucinations  Other reaction(s): Hives    Hydrocodone Other (See Comments)     Mental status changes       Labs:    Recent Labs  Lab  07/20/18  1116   INR 1.1       Recent Labs  Lab 07/21/18  0526   WBC 4.33   HGB 6.6*   HCT 20.6*   MCV 84   PLT 55*      Recent Labs  Lab 07/21/18  0526         K 4.4      CO2 25   BUN 32*   CREATININE 7.0*   CALCIUM 8.2*   ALT 36   AST 45*   ALBUMIN 2.3*   BILITOT 0.6       Vitals (Most Recent):  Temp: 97.6 °F (36.4 °C) (07/21/18 1415)  Pulse: 89 (07/21/18 1415)  Resp: 16 (07/21/18 1415)  BP: (!) 172/113 (07/21/18 1415)  SpO2: 99 % (07/21/18 0744)    ASA: 3  Mallampati: 2    Consent obtained    ASSESSMENT/PLAN:     Pelvic arteriogram and embolization as indicated.  If no active extravasation present, will likely empirically embolize the B uterine arteries.  Moderate sedation.    Active Hospital Problems    Diagnosis  POA    Symptomatic anemia [D64.9]  Yes      Resolved Hospital Problems    Diagnosis Date Resolved POA   No resolved problems to display.           Aren Ramos MD

## 2024-11-18 NOTE — PROGRESS NOTES
I personally saw and examined the patient on hemodialysis, tolerating treatment, see hemodyalisis flowsheet. I also reviewed the chart and current medication. The dialysis bath was adjusted.   Hemoglobin low from bleeding.   Patient doing well clinically but still bleeding. Her BP is uncontrolled.   Will change her Lisinopril to Irbesartan  Will optimize her DW and increase UF today from 2 liter to 3 liter net.      Received Renewal request via [employer]  for insulin lispro 100 UNIT/ML INJ . (Quantity:90 ml, Day Supply:90)    Insurance will cover a 30 days supply at a time (No new RX needed)     Insurance: RX [employer]/ [employer]   Member ID:  [id number]  BIN: [id number]  PCN: [id number]  Group: [id number]     Ran Test claim via [employer] & medication  pays for a $30.00 copay    Prescription will be released to preferred pharmacy for processing: [facility]    Art Guzmán [facility]   Pharmacy Liaison  [phone]

## 2024-11-19 NOTE — PROGRESS NOTES
3 hr hd completed, net fluid [id number] mls, target goal achieved, pt tolerated well.  AVF deaccessed,  pressure held to the site 10 mins and 6 mins, A/V respectively, hemostasis achieved, bruit/thrill present post tx.                      Notice received by fax from [facility] that Prior Authorization is not required . Placed in Dr Monet pandey.

## 2025-05-27 NOTE — PLAN OF CARE
02/28/18 1212   Medicare Message   Important Message from Medicare regarding Discharge Appeal Rights Given to patient/caregiver;Explained to patient/caregiver;Signed/date by patient/caregiver   Date IMM was signed 02/28/18   Time IMM was signed 1213      Ricardo Nunez (Attending) <<----- Click to Select Surgeon

## 2025-06-15 NOTE — ASSESSMENT & PLAN NOTE
Problem: Adult Inpatient Plan of Care  Goal: Plan of Care Review  Outcome: Progressing  Goal: Patient-Specific Goal (Individualized)  Outcome: Progressing  Goal: Absence of Hospital-Acquired Illness or Injury  Outcome: Progressing  Goal: Optimal Comfort and Wellbeing  Outcome: Progressing  Goal: Readiness for Transition of Care  Outcome: Progressing     Problem: Infection  Goal: Absence of Infection Signs and Symptoms  Outcome: Progressing     Problem: Stroke, Ischemic (Includes Transient Ischemic Attack)  Goal: Optimal Coping  Outcome: Progressing  Goal: Effective Bowel Elimination  Outcome: Progressing  Goal: Optimal Cerebral Tissue Perfusion  Outcome: Progressing  Goal: Optimal Cognitive Function  Outcome: Progressing  Goal: Improved Communication Skills  Outcome: Progressing  Goal: Optimal Functional Ability  Outcome: Progressing  Goal: Optimal Nutrition Intake  Outcome: Progressing  Goal: Effective Oxygenation and Ventilation  Outcome: Progressing  Goal: Improved Sensorimotor Function  Outcome: Progressing  Goal: Safe and Effective Swallow  Outcome: Progressing  Goal: Effective Urinary Elimination  Outcome: Progressing     Problem: Skin Injury Risk Increased  Goal: Skin Health and Integrity  Outcome: Progressing      -nephrology managing

## (undated) DEVICE — SEE MEDLINE ITEM 146313

## (undated) DEVICE — SEE MEDLINE ITEM 157117

## (undated) DEVICE — PAD PREP 50/CA

## (undated) DEVICE — SPONGE GAUZE 16PLY 4X4

## (undated) DEVICE — KIT EVACUATOR 3-SPRING 1/8 DRN

## (undated) DEVICE — SEE MEDLINE ITEM 153151

## (undated) DEVICE — CLIP LIGACLIP XTRA TITANIUM

## (undated) DEVICE — SET BASIN 48X48IN 6000ML RING

## (undated) DEVICE — DRESSING SURGICAL 1X3

## (undated) DEVICE — TRAY DRY SKIN SCRUB PREP

## (undated) DEVICE — LOOP VESSEL BLUE MAXI

## (undated) DEVICE — CORD BIPOLAR 12 FOOT

## (undated) DEVICE — UNDERGLOVES BIOGEL PI SIZE 8

## (undated) DEVICE — ELECTRODE REM PLYHSV RETURN 9

## (undated) DEVICE — GAUZE SPONGE PEANUT STRL

## (undated) DEVICE — PENCIL ELECTROSURG HOLST W/BLD

## (undated) DEVICE — Device

## (undated) DEVICE — PROBE SIMULATOR KRAFF

## (undated) DEVICE — TUBE FRAZIER 5MM 2FT SOFT TIP

## (undated) DEVICE — DRAPE MICROSCOPE OPMI

## (undated) DEVICE — SEE MEDLINE ITEM 154981

## (undated) DEVICE — SWAB PROCTO 8 IN

## (undated) DEVICE — DRESSING SURGICAL 1/2X1/2

## (undated) DEVICE — FORCEP SPETZLER MALIS 8IN 1MM

## (undated) DEVICE — ELECTRODE BLADE INSULATED 1 IN

## (undated) DEVICE — GLOVE BIOGEL SKINSENSE PI 8.0

## (undated) DEVICE — GLOVE BIOGEL SKINSENSE PI 6.5

## (undated) DEVICE — SUT D SPECIAL VICRYL 2-0

## (undated) DEVICE — SWAB PROCTO 16 X 1 1/2 ST 2/PK

## (undated) DEVICE — SUT VICRYL PLUS 3-0 SH 18IN

## (undated) DEVICE — DRESSING TELFA STRL 4X3 LF

## (undated) DEVICE — SUT 4-0 12-18IN SILK BLACK

## (undated) DEVICE — SCRUB 10% POVIDONE IODINE 4OZ

## (undated) DEVICE — TRAY FOLEY 16FR INFECTION CONT

## (undated) DEVICE — PAD PERINEAL SUPINE

## (undated) DEVICE — NDL HYPO REG 25G X 1 1/2

## (undated) DEVICE — STAPLER SKIN PROXIMATE WIDE

## (undated) DEVICE — ROUTER TAPERED 2.3MM

## (undated) DEVICE — SEE MEDLINE ITEM 152532

## (undated) DEVICE — SUT VICRYL+ 2-0 SH 18IN

## (undated) DEVICE — CATH SELF-CATH FEMALE 14FR 6IN

## (undated) DEVICE — BLADE SURG STAINLESS STEEL #15

## (undated) DEVICE — TRAY MINOR GEN SURG

## (undated) DEVICE — JELLY KY LUBRICATING 5G PACKET

## (undated) DEVICE — TUBING SMOKE EVACUATOR LEEP

## (undated) DEVICE — SUT 2/0 30IN SILK BLK BRAI

## (undated) DEVICE — SOL PVP-I SCRUB 7.5% 4OZ

## (undated) DEVICE — SEE MEDLINE ITEM 157110

## (undated) DEVICE — DRESSING TELFA N ADH 3X8

## (undated) DEVICE — HEMOSTAT SURGICEL 4X8IN

## (undated) DEVICE — ACCESS SYS BRAIN 21X15X5

## (undated) DEVICE — SEE MEDLINE ITEM 157131

## (undated) DEVICE — SEE L#133928

## (undated) DEVICE — SUT 3-0 12-18IN SILK

## (undated) DEVICE — CATH URETHRAL 16FR RED

## (undated) DEVICE — DRAPE THYROID WITH ARMBOARD

## (undated) DEVICE — CONTAINER SPECIMEN STRL 4OZ

## (undated) DEVICE — TUBING NEPTUNE 2 SMOKE 10IN

## (undated) DEVICE — MARKER SKIN STND TIP BLUE BARR

## (undated) DEVICE — DRESSING SURGICAL 3/4X3/4

## (undated) DEVICE — TAPE SURG MEDIPORE 6X72IN

## (undated) DEVICE — DURAPREP SURG SCRUB 26ML

## (undated) DEVICE — LUBRICANT SURGILUBE 2 OZ

## (undated) DEVICE — PACK UNIVERSAL SPLIT II

## (undated) DEVICE — SUT VICRYL PLUS 0 CT1 36IN

## (undated) DEVICE — GLOVE BIOGEL ORTHOPEDIC 7

## (undated) DEVICE — SUT MONOCRYL 5-0 P-3 UND 18

## (undated) DEVICE — SOL 9P NACL IRR PIC IL

## (undated) DEVICE — SEE MEDLINE ITEM 157116

## (undated) DEVICE — ELECTRODE LOOP 15X12X11 TNGSTN

## (undated) DEVICE — HOOK LONE STAR BLUNT 12MM

## (undated) DEVICE — SUT 4/0 18IN NUROLON BLK B

## (undated) DEVICE — KIT FIBRIN SEALANT EVICEL 5 ML

## (undated) DEVICE — WARMER DRAPE STERILE LF

## (undated) DEVICE — SEE MEDLINE ITEM 152622

## (undated) DEVICE — SEE MEDLINE ITEM 157194

## (undated) DEVICE — DRAIN HEMOVAC 100ML FULL PERF

## (undated) DEVICE — BURR ACORN 7.5MM

## (undated) DEVICE — GAUZE DERMACEA 3 PLY 4IN 4YD

## (undated) DEVICE — SEE MEDLINE ITEM 157144

## (undated) DEVICE — BIT DRILL WIRE PASS 1.0MM

## (undated) DEVICE — SUT VICRYL PLUS 0 CT1 18IN

## (undated) DEVICE — GLOVE BIOGEL SKINSENSE PI 7.5

## (undated) DEVICE — NDL STRAIGHT 4CM LEIBINGER

## (undated) DEVICE — SYS CLSR DERMABOND PRINEO 22CM

## (undated) DEVICE — CARTRIDGE OIL

## (undated) DEVICE — SEE MEDLINE ITEM 156905

## (undated) DEVICE — DIFFUSER

## (undated) DEVICE — PAD PERI POST REPLACEMNT

## (undated) DEVICE — SPRAY MASTISOL

## (undated) DEVICE — APPLIER LIGACLIP SM 9.38IN

## (undated) DEVICE — ADHESIVE DERMABOND ADVANCED

## (undated) DEVICE — SYR 30CC LUER LOCK

## (undated) DEVICE — SUT LIGACLIP SMALL XTRA

## (undated) DEVICE — SEE MEDLINE ITEM 146420